# Patient Record
Sex: FEMALE | Race: WHITE | NOT HISPANIC OR LATINO | Employment: OTHER | URBAN - NONMETROPOLITAN AREA
[De-identification: names, ages, dates, MRNs, and addresses within clinical notes are randomized per-mention and may not be internally consistent; named-entity substitution may affect disease eponyms.]

---

## 2017-05-21 ENCOUNTER — APPOINTMENT (OUTPATIENT)
Dept: GENERAL RADIOLOGY | Facility: HOSPITAL | Age: 82
End: 2017-05-21

## 2017-05-21 ENCOUNTER — HOSPITAL ENCOUNTER (EMERGENCY)
Facility: HOSPITAL | Age: 82
Discharge: HOME | End: 2017-05-21
Attending: EMERGENCY MEDICINE | Admitting: EMERGENCY MEDICINE

## 2017-05-21 VITALS
TEMPERATURE: 97.5 F | DIASTOLIC BLOOD PRESSURE: 82 MMHG | BODY MASS INDEX: 28.25 KG/M2 | HEART RATE: 79 BPM | OXYGEN SATURATION: 96 % | WEIGHT: 180 LBS | HEIGHT: 67 IN | SYSTOLIC BLOOD PRESSURE: 125 MMHG | RESPIRATION RATE: 22 BRPM

## 2017-05-21 DIAGNOSIS — I50.32 CHRONIC DIASTOLIC CONGESTIVE HEART FAILURE (HCC): Primary | ICD-10-CM

## 2017-05-21 LAB
ALBUMIN SERPL-MCNC: 4 G/DL (ref 3.4–4.8)
ALBUMIN/GLOB SERPL: 1.4 G/DL (ref 1.5–2.5)
ALP SERPL-CCNC: 53 U/L (ref 35–104)
ALT SERPL W P-5'-P-CCNC: 12 U/L (ref 10–36)
ANION GAP SERPL CALCULATED.3IONS-SCNC: 10.4 MMOL/L (ref 3.6–11.2)
AST SERPL-CCNC: 16 U/L (ref 10–30)
BASOPHILS # BLD AUTO: 0.07 10*3/MM3 (ref 0–0.3)
BASOPHILS NFR BLD AUTO: 1 % (ref 0–2)
BILIRUB SERPL-MCNC: 0.9 MG/DL (ref 0.2–1.8)
BNP SERPL-MCNC: 2050 PG/ML (ref 0–100)
BUN BLD-MCNC: 18 MG/DL (ref 7–21)
BUN/CREAT SERPL: 10.2 (ref 7–25)
CALCIUM SPEC-SCNC: 9.3 MG/DL (ref 7.7–10)
CHLORIDE SERPL-SCNC: 104 MMOL/L (ref 99–112)
CO2 SERPL-SCNC: 26.6 MMOL/L (ref 24.3–31.9)
CREAT BLD-MCNC: 1.77 MG/DL (ref 0.43–1.29)
DEPRECATED RDW RBC AUTO: 48.2 FL (ref 37–54)
EOSINOPHIL # BLD AUTO: 0.1 10*3/MM3 (ref 0–0.7)
EOSINOPHIL NFR BLD AUTO: 1.4 % (ref 0–7)
ERYTHROCYTE [DISTWIDTH] IN BLOOD BY AUTOMATED COUNT: 18 % (ref 11.5–14.5)
GFR SERPL CREATININE-BSD FRML MDRD: 27 ML/MIN/1.73
GLOBULIN UR ELPH-MCNC: 2.9 GM/DL
GLUCOSE BLD-MCNC: 103 MG/DL (ref 70–110)
HCT VFR BLD AUTO: 33.1 % (ref 37–47)
HGB BLD-MCNC: 9.8 G/DL (ref 12–16)
HOLD SPECIMEN: NORMAL
HOLD SPECIMEN: NORMAL
IMM GRANULOCYTES # BLD: 0 10*3/MM3 (ref 0–0.03)
IMM GRANULOCYTES NFR BLD: 0 % (ref 0–0.5)
LYMPHOCYTES # BLD AUTO: 0.93 10*3/MM3 (ref 1–3)
LYMPHOCYTES NFR BLD AUTO: 13.5 % (ref 16–46)
MCH RBC QN AUTO: 22.8 PG (ref 27–33)
MCHC RBC AUTO-ENTMCNC: 29.6 G/DL (ref 33–37)
MCV RBC AUTO: 77 FL (ref 80–94)
MONOCYTES # BLD AUTO: 0.56 10*3/MM3 (ref 0.1–0.9)
MONOCYTES NFR BLD AUTO: 8.1 % (ref 0–12)
NEUTROPHILS # BLD AUTO: 5.25 10*3/MM3 (ref 1.4–6.5)
NEUTROPHILS NFR BLD AUTO: 76 % (ref 40–75)
OSMOLALITY SERPL CALC.SUM OF ELEC: 283.4 MOSM/KG (ref 273–305)
PLATELET # BLD AUTO: 268 10*3/MM3 (ref 130–400)
PMV BLD AUTO: 10 FL (ref 6–10)
POTASSIUM BLD-SCNC: 3.8 MMOL/L (ref 3.5–5.3)
PROT SERPL-MCNC: 6.9 G/DL (ref 6–8)
RBC # BLD AUTO: 4.3 10*6/MM3 (ref 4.2–5.4)
SODIUM BLD-SCNC: 141 MMOL/L (ref 135–153)
TROPONIN I SERPL-MCNC: 0.01 NG/ML
WBC NRBC COR # BLD: 6.91 10*3/MM3 (ref 4.5–12.5)
WHOLE BLOOD HOLD SPECIMEN: NORMAL

## 2017-05-21 PROCEDURE — 93005 ELECTROCARDIOGRAM TRACING: CPT

## 2017-05-21 PROCEDURE — 71020 XR CHEST 2 VW: CPT | Performed by: RADIOLOGY

## 2017-05-21 PROCEDURE — 83880 ASSAY OF NATRIURETIC PEPTIDE: CPT | Performed by: EMERGENCY MEDICINE

## 2017-05-21 PROCEDURE — 93010 ELECTROCARDIOGRAM REPORT: CPT | Performed by: INTERNAL MEDICINE

## 2017-05-21 PROCEDURE — 96372 THER/PROPH/DIAG INJ SC/IM: CPT

## 2017-05-21 PROCEDURE — 99285 EMERGENCY DEPT VISIT HI MDM: CPT

## 2017-05-21 PROCEDURE — 80053 COMPREHEN METABOLIC PANEL: CPT | Performed by: EMERGENCY MEDICINE

## 2017-05-21 PROCEDURE — 71020 HC CHEST PA AND LATERAL: CPT

## 2017-05-21 PROCEDURE — 84484 ASSAY OF TROPONIN QUANT: CPT | Performed by: EMERGENCY MEDICINE

## 2017-05-21 PROCEDURE — 85025 COMPLETE CBC W/AUTO DIFF WBC: CPT | Performed by: EMERGENCY MEDICINE

## 2017-05-21 RX ORDER — BUMETANIDE 0.25 MG/ML
1 INJECTION INTRAMUSCULAR; INTRAVENOUS ONCE
Status: COMPLETED | OUTPATIENT
Start: 2017-05-21 | End: 2017-05-21

## 2017-05-21 RX ORDER — SODIUM CHLORIDE 0.9 % (FLUSH) 0.9 %
10 SYRINGE (ML) INJECTION AS NEEDED
Status: DISCONTINUED | OUTPATIENT
Start: 2017-05-21 | End: 2017-05-21 | Stop reason: HOSPADM

## 2017-05-21 RX ORDER — FUROSEMIDE 40 MG/1
40 TABLET ORAL ONCE
Status: COMPLETED | OUTPATIENT
Start: 2017-05-21 | End: 2017-05-21

## 2017-05-21 RX ADMIN — BUMETANIDE 1 MG: 0.25 INJECTION, SOLUTION INTRAMUSCULAR; INTRAVENOUS at 19:01

## 2017-05-21 RX ADMIN — FUROSEMIDE 40 MG: 40 TABLET ORAL at 16:39

## 2017-06-09 ENCOUNTER — APPOINTMENT (OUTPATIENT)
Dept: GENERAL RADIOLOGY | Facility: HOSPITAL | Age: 82
End: 2017-06-09

## 2017-06-09 ENCOUNTER — HOSPITAL ENCOUNTER (INPATIENT)
Facility: HOSPITAL | Age: 82
LOS: 3 days | Discharge: NURSING, REHAB OR LONG-TERM CARE | End: 2017-06-12
Attending: FAMILY MEDICINE | Admitting: FAMILY MEDICINE

## 2017-06-09 PROBLEM — R60.9 EDEMA: Status: ACTIVE | Noted: 2017-06-09

## 2017-06-09 LAB
A-A DO2: 123.1 MMHG (ref 0–300)
ALBUMIN SERPL-MCNC: 4 G/DL (ref 3.4–4.8)
ALBUMIN/GLOB SERPL: 1.3 G/DL (ref 1.5–2.5)
ALP SERPL-CCNC: 60 U/L (ref 35–104)
ALT SERPL W P-5'-P-CCNC: 13 U/L (ref 10–36)
ANION GAP SERPL CALCULATED.3IONS-SCNC: 7.8 MMOL/L (ref 3.6–11.2)
ARTERIAL PATENCY WRIST A: ABNORMAL
AST SERPL-CCNC: 14 U/L (ref 10–30)
ATMOSPHERIC PRESS: 726 MMHG
BASE EXCESS BLDA CALC-SCNC: -3.2 MMOL/L
BASOPHILS # BLD AUTO: 0.04 10*3/MM3 (ref 0–0.3)
BASOPHILS NFR BLD AUTO: 0.5 % (ref 0–2)
BDY SITE: ABNORMAL
BILIRUB SERPL-MCNC: 1.2 MG/DL (ref 0.2–1.8)
BNP SERPL-MCNC: 2486 PG/ML (ref 0–100)
BODY TEMPERATURE: 98.6 C
BUN BLD-MCNC: 27 MG/DL (ref 7–21)
BUN/CREAT SERPL: 15.1 (ref 7–25)
CALCIUM SPEC-SCNC: 9.6 MG/DL (ref 7.7–10)
CHLORIDE SERPL-SCNC: 105 MMOL/L (ref 99–112)
CO2 SERPL-SCNC: 28.2 MMOL/L (ref 24.3–31.9)
COHGB MFR BLD: 1.1 % (ref 0–5)
CREAT BLD-MCNC: 1.79 MG/DL (ref 0.43–1.29)
DEPRECATED RDW RBC AUTO: 48.6 FL (ref 37–54)
EOSINOPHIL # BLD AUTO: 0.01 10*3/MM3 (ref 0–0.7)
EOSINOPHIL NFR BLD AUTO: 0.1 % (ref 0–7)
ERYTHROCYTE [DISTWIDTH] IN BLOOD BY AUTOMATED COUNT: 18.7 % (ref 11.5–14.5)
GFR SERPL CREATININE-BSD FRML MDRD: 27 ML/MIN/1.73
GLOBULIN UR ELPH-MCNC: 3.2 GM/DL
GLUCOSE BLD-MCNC: 130 MG/DL (ref 70–110)
HCO3 BLDA-SCNC: 20.4 MMOL/L (ref 22–26)
HCT VFR BLD AUTO: 31.2 % (ref 37–47)
HCT VFR BLD CALC: 30 % (ref 37–47)
HGB BLD-MCNC: 9.4 G/DL (ref 12–16)
HGB BLDA-MCNC: 10.2 G/DL (ref 12–16)
HOROWITZ INDEX BLD+IHG-RTO: 32 %
IMM GRANULOCYTES # BLD: 0.02 10*3/MM3 (ref 0–0.03)
IMM GRANULOCYTES NFR BLD: 0.2 % (ref 0–0.5)
INR PPP: 4.94 (ref 0.8–1.1)
LYMPHOCYTES # BLD AUTO: 0.44 10*3/MM3 (ref 1–3)
LYMPHOCYTES NFR BLD AUTO: 5.5 % (ref 16–46)
MCH RBC QN AUTO: 22.9 PG (ref 27–33)
MCHC RBC AUTO-ENTMCNC: 30.1 G/DL (ref 33–37)
MCV RBC AUTO: 75.9 FL (ref 80–94)
METHGB BLD QL: 0.4 % (ref 0–3)
MODALITY: ABNORMAL
MONOCYTES # BLD AUTO: 0.47 10*3/MM3 (ref 0.1–0.9)
MONOCYTES NFR BLD AUTO: 5.8 % (ref 0–12)
NEUTROPHILS # BLD AUTO: 7.06 10*3/MM3 (ref 1.4–6.5)
NEUTROPHILS NFR BLD AUTO: 87.9 % (ref 40–75)
OSMOLALITY SERPL CALC.SUM OF ELEC: 288.1 MOSM/KG (ref 273–305)
OXYHGB MFR BLDV: 86.7 % (ref 85–100)
PCO2 BLDA: 31.6 MM HG (ref 35–45)
PH BLDA: 7.43 PH UNITS (ref 7.35–7.45)
PLATELET # BLD AUTO: 258 10*3/MM3 (ref 130–400)
PMV BLD AUTO: 9.9 FL (ref 6–10)
PO2 BLDA: 57.2 MM HG (ref 80–100)
POTASSIUM BLD-SCNC: 3.8 MMOL/L (ref 3.5–5.3)
PROT SERPL-MCNC: 7.2 G/DL (ref 6–8)
PROTHROMBIN TIME: 60.7 SECONDS (ref 9.8–11.9)
RBC # BLD AUTO: 4.11 10*6/MM3 (ref 4.2–5.4)
SAO2 % BLDCOA: 88 % (ref 90–100)
SODIUM BLD-SCNC: 141 MMOL/L (ref 135–153)
WBC NRBC COR # BLD: 8.04 10*3/MM3 (ref 4.5–12.5)

## 2017-06-09 PROCEDURE — 82805 BLOOD GASES W/O2 SATURATION: CPT | Performed by: FAMILY MEDICINE

## 2017-06-09 PROCEDURE — 85610 PROTHROMBIN TIME: CPT | Performed by: FAMILY MEDICINE

## 2017-06-09 PROCEDURE — 25010000002 ONDANSETRON PER 1 MG: Performed by: INTERNAL MEDICINE

## 2017-06-09 PROCEDURE — 83880 ASSAY OF NATRIURETIC PEPTIDE: CPT | Performed by: FAMILY MEDICINE

## 2017-06-09 PROCEDURE — 83050 HGB METHEMOGLOBIN QUAN: CPT | Performed by: FAMILY MEDICINE

## 2017-06-09 PROCEDURE — 94799 UNLISTED PULMONARY SVC/PX: CPT

## 2017-06-09 PROCEDURE — 82375 ASSAY CARBOXYHB QUANT: CPT | Performed by: FAMILY MEDICINE

## 2017-06-09 PROCEDURE — 80053 COMPREHEN METABOLIC PANEL: CPT | Performed by: FAMILY MEDICINE

## 2017-06-09 PROCEDURE — 85025 COMPLETE CBC W/AUTO DIFF WBC: CPT | Performed by: FAMILY MEDICINE

## 2017-06-09 PROCEDURE — 71020 XR CHEST PA AND LATERAL: CPT | Performed by: RADIOLOGY

## 2017-06-09 PROCEDURE — 36600 WITHDRAWAL OF ARTERIAL BLOOD: CPT | Performed by: FAMILY MEDICINE

## 2017-06-09 PROCEDURE — 71020 HC CHEST PA AND LATERAL: CPT

## 2017-06-09 RX ORDER — AMLODIPINE BESYLATE 10 MG/1
10 TABLET ORAL NIGHTLY
Status: CANCELLED | OUTPATIENT
Start: 2017-06-09

## 2017-06-09 RX ORDER — HYDROCODONE BITARTRATE AND ACETAMINOPHEN 7.5; 325 MG/1; MG/1
1 TABLET ORAL EVERY 12 HOURS SCHEDULED
Status: CANCELLED | OUTPATIENT
Start: 2017-06-09

## 2017-06-09 RX ORDER — METOPROLOL TARTRATE 100 MG/1
100 TABLET ORAL 2 TIMES DAILY
Status: CANCELLED | OUTPATIENT
Start: 2017-06-09

## 2017-06-09 RX ORDER — FUROSEMIDE 20 MG/1
20 TABLET ORAL DAILY
Status: CANCELLED | OUTPATIENT
Start: 2017-06-09

## 2017-06-09 RX ORDER — AMLODIPINE BESYLATE 10 MG/1
10 TABLET ORAL DAILY
Status: CANCELLED | OUTPATIENT
Start: 2017-06-09

## 2017-06-09 RX ORDER — ONDANSETRON 2 MG/ML
4 INJECTION INTRAMUSCULAR; INTRAVENOUS EVERY 6 HOURS PRN
Status: DISCONTINUED | OUTPATIENT
Start: 2017-06-09 | End: 2017-06-10

## 2017-06-09 RX ORDER — CITALOPRAM 20 MG/1
20 TABLET ORAL DAILY
Status: CANCELLED | OUTPATIENT
Start: 2017-06-09

## 2017-06-09 RX ORDER — ISOSORBIDE MONONITRATE 60 MG/1
60 TABLET, EXTENDED RELEASE ORAL NIGHTLY
Status: CANCELLED | OUTPATIENT
Start: 2017-06-09

## 2017-06-09 RX ORDER — CITALOPRAM 20 MG/1
20 TABLET ORAL NIGHTLY
Status: CANCELLED | OUTPATIENT
Start: 2017-06-09

## 2017-06-09 RX ORDER — ALPRAZOLAM 1 MG/1
1 TABLET ORAL 2 TIMES DAILY
Status: CANCELLED | OUTPATIENT
Start: 2017-06-09

## 2017-06-09 RX ORDER — WARFARIN SODIUM 4 MG/1
6 TABLET ORAL
Status: ON HOLD | COMMUNITY
End: 2017-07-27

## 2017-06-09 RX ORDER — PANTOPRAZOLE SODIUM 40 MG/1
40 TABLET, DELAYED RELEASE ORAL DAILY
Status: CANCELLED | OUTPATIENT
Start: 2017-06-09

## 2017-06-09 RX ORDER — PANTOPRAZOLE SODIUM 40 MG/1
40 TABLET, DELAYED RELEASE ORAL EVERY MORNING
Status: CANCELLED | OUTPATIENT
Start: 2017-06-09

## 2017-06-09 RX ORDER — SODIUM CHLORIDE 0.9 % (FLUSH) 0.9 %
1-10 SYRINGE (ML) INJECTION AS NEEDED
Status: DISCONTINUED | OUTPATIENT
Start: 2017-06-09 | End: 2017-06-12 | Stop reason: HOSPADM

## 2017-06-09 RX ORDER — ALBUTEROL SULFATE 2.5 MG/3ML
2.5 SOLUTION RESPIRATORY (INHALATION)
Status: DISCONTINUED | OUTPATIENT
Start: 2017-06-10 | End: 2017-06-11

## 2017-06-09 RX ORDER — ISOSORBIDE MONONITRATE 60 MG/1
60 TABLET, EXTENDED RELEASE ORAL DAILY
Status: CANCELLED | OUTPATIENT
Start: 2017-06-09

## 2017-06-09 RX ORDER — ALPRAZOLAM 1 MG/1
1 TABLET ORAL EVERY 12 HOURS SCHEDULED
Status: CANCELLED | OUTPATIENT
Start: 2017-06-09

## 2017-06-09 RX ORDER — HYDROCODONE BITARTRATE AND ACETAMINOPHEN 7.5; 325 MG/1; MG/1
1 TABLET ORAL 2 TIMES DAILY
Status: CANCELLED | OUTPATIENT
Start: 2017-06-09

## 2017-06-09 RX ORDER — METOPROLOL TARTRATE 100 MG/1
100 TABLET ORAL EVERY 12 HOURS SCHEDULED
Status: CANCELLED | OUTPATIENT
Start: 2017-06-09

## 2017-06-09 RX ADMIN — ONDANSETRON 4 MG: 2 INJECTION, SOLUTION INTRAMUSCULAR; INTRAVENOUS at 23:12

## 2017-06-09 NOTE — H&P
CHIEF COMPLAINT:  Generalized edema, short of breath.     HISTORY OF PRESENT ILLNESS:  Patient is an 84 year old with a history of hypertension, chronic kidney disease, chronic atrial fibrillation that presented in a wheelchair, just stating that she is just having swelling in her legs, tightness in her chest, and just generalized shortness of breath. She has a history of some chronic kidney disease stage 4, but has not gone to see her  nephrology appointment. With the progressive shortness of breath and edema, we will admit.     SOCIAL HISTORY:  She does not smoke or drink.     ALLERGIES:  No known drug allergies.     MEDICATIONS:  1.  Xanax 1 mg b.i.d.   2.  Norco p.r.n.   3.  Protonix 40 daily.   4.  Celexa 20 daily.  5.  Amlodipine 10 daily.   6.  Metoprolol 100 b.i.d.   7.  Isosorbide 60 daily.   8.  Lasix 20 daily.   9.  Coumadin 4 mg 1-1/2 daily.     PAST MEDICAL HISTORY:  1.  A degenerative disk of her back.  2.  Hypertension.   3.  OA of the knees.  4.  Chronic atrial fibrillation.  5.  Chronic kidney disease.     PAST SURGICAL HISTORY:  She has had coronary artery stent per Dr. Bernal in 2014.     PHYSICAL EXAMINATION:  GENERAL APPEARANCE: She is alert; looks like she does not feel well.   NECK: Supple, with no JVD.   LUNGS: With bilateral crackles.   HEART: S1, S2.   ABDOMEN: Soft.   EXTREMITIES: With +3 edema.     IMPRESSION:  1.  Edema.  2.  Chronic kidney disease stage 4, of which has not followed up with Nephrology.   3.  Chronic atrial fibrillation.   4.  Probable CHF.     PLAN:  To admit, obtain general labs, Nephrology consult, and diurese if her kidneys are appropriate for. We will obtain a Nephrology assistance in her electrolytes and renal status.            D: MARLEN 06/09/2017 16:49:13 ET  T: ms / 06/09/2017 17:26:38 ET  Revision Count: 0  Job ID: [id number]  [id number]  cc:        Dictator Signature:___________________________     Myles Barone MD

## 2017-06-09 NOTE — PLAN OF CARE
Problem: Fall Risk (Adult)  Goal: Identify Related Risk Factors and Signs and Symptoms  Outcome: Ongoing (interventions implemented as appropriate)  Goal: Absence of Falls  Outcome: Ongoing (interventions implemented as appropriate)    Problem: Pressure Ulcer Risk (Diogo Scale) (Adult,Obstetrics,Pediatric)  Goal: Identify Related Risk Factors and Signs and Symptoms  Outcome: Ongoing (interventions implemented as appropriate)  Goal: Skin Integrity  Outcome: Ongoing (interventions implemented as appropriate)    Problem: Infection, Risk/Actual (Adult)  Goal: Identify Related Risk Factors and Signs and Symptoms  Outcome: Ongoing (interventions implemented as appropriate)  Goal: Infection Prevention/Resolution  Outcome: Ongoing (interventions implemented as appropriate)    Problem: Fluid Volume Excess (Adult,Obstetrics,Pediatric)  Goal: Identify Related Risk Factors and Signs and Symptoms  Outcome: Ongoing (interventions implemented as appropriate)  Goal: Stable Weight  Outcome: Ongoing (interventions implemented as appropriate)  Goal: Balanced Intake/Output  Outcome: Ongoing (interventions implemented as appropriate)

## 2017-06-10 LAB
ALBUMIN UR-MCNC: 270.9 MG/L
BACTERIA UR QL AUTO: ABNORMAL /HPF
BILIRUB UR QL STRIP: NEGATIVE
CLARITY UR: CLEAR
COLOR UR: ABNORMAL
CREAT UR-MCNC: 114 MG/DL
CREAT UR-MCNC: 114 MG/DL
GLUCOSE UR STRIP-MCNC: NEGATIVE MG/DL
HGB UR QL STRIP.AUTO: ABNORMAL
HYALINE CASTS UR QL AUTO: ABNORMAL /LPF
INR PPP: 4.46 (ref 0.8–1.1)
KETONES UR QL STRIP: NEGATIVE
LEUKOCYTE ESTERASE UR QL STRIP.AUTO: NEGATIVE
MICROALBUMIN/CREAT UR: 237.6 MG/G
NITRITE UR QL STRIP: NEGATIVE
PH UR STRIP.AUTO: 5.5 [PH] (ref 5–8)
PROT UR QL STRIP: ABNORMAL
PROT UR-MCNC: 53.6 MG/DL
PROT/CREAT UR: 470.2 MG/G CREA (ref 0–200)
PROTHROMBIN TIME: 54.5 SECONDS (ref 9.8–11.9)
RBC # UR: ABNORMAL /HPF
REF LAB TEST METHOD: ABNORMAL
SP GR UR STRIP: 1.02 (ref 1–1.03)
SQUAMOUS #/AREA URNS HPF: ABNORMAL /HPF
UROBILINOGEN UR QL STRIP: ABNORMAL
WBC UR QL AUTO: ABNORMAL /HPF

## 2017-06-10 PROCEDURE — 82570 ASSAY OF URINE CREATININE: CPT | Performed by: INTERNAL MEDICINE

## 2017-06-10 PROCEDURE — 82043 UR ALBUMIN QUANTITATIVE: CPT | Performed by: INTERNAL MEDICINE

## 2017-06-10 PROCEDURE — 94640 AIRWAY INHALATION TREATMENT: CPT

## 2017-06-10 PROCEDURE — 84156 ASSAY OF PROTEIN URINE: CPT | Performed by: INTERNAL MEDICINE

## 2017-06-10 PROCEDURE — 85610 PROTHROMBIN TIME: CPT | Performed by: FAMILY MEDICINE

## 2017-06-10 PROCEDURE — 81001 URINALYSIS AUTO W/SCOPE: CPT | Performed by: INTERNAL MEDICINE

## 2017-06-10 PROCEDURE — 94799 UNLISTED PULMONARY SVC/PX: CPT

## 2017-06-10 PROCEDURE — 25010000002 FUROSEMIDE PER 20 MG: Performed by: INTERNAL MEDICINE

## 2017-06-10 RX ORDER — FUROSEMIDE 10 MG/ML
40 INJECTION INTRAMUSCULAR; INTRAVENOUS EVERY 12 HOURS SCHEDULED
Status: DISCONTINUED | OUTPATIENT
Start: 2017-06-10 | End: 2017-06-12 | Stop reason: HOSPADM

## 2017-06-10 RX ORDER — ISOSORBIDE MONONITRATE 60 MG/1
60 TABLET, EXTENDED RELEASE ORAL
Status: DISCONTINUED | OUTPATIENT
Start: 2017-06-10 | End: 2017-06-12 | Stop reason: HOSPADM

## 2017-06-10 RX ORDER — ALPRAZOLAM 1 MG/1
1 TABLET ORAL 2 TIMES DAILY PRN
Status: DISCONTINUED | OUTPATIENT
Start: 2017-06-10 | End: 2017-06-12 | Stop reason: HOSPADM

## 2017-06-10 RX ORDER — METOPROLOL TARTRATE 100 MG/1
100 TABLET ORAL EVERY 12 HOURS SCHEDULED
Status: DISCONTINUED | OUTPATIENT
Start: 2017-06-10 | End: 2017-06-12 | Stop reason: HOSPADM

## 2017-06-10 RX ORDER — PANTOPRAZOLE SODIUM 40 MG/1
40 TABLET, DELAYED RELEASE ORAL DAILY
Status: DISCONTINUED | OUTPATIENT
Start: 2017-06-10 | End: 2017-06-12 | Stop reason: HOSPADM

## 2017-06-10 RX ORDER — CITALOPRAM 20 MG/1
20 TABLET ORAL DAILY
Status: DISCONTINUED | OUTPATIENT
Start: 2017-06-10 | End: 2017-06-12 | Stop reason: HOSPADM

## 2017-06-10 RX ORDER — HYDROCODONE BITARTRATE AND ACETAMINOPHEN 7.5; 325 MG/1; MG/1
1 TABLET ORAL 2 TIMES DAILY PRN
Status: DISCONTINUED | OUTPATIENT
Start: 2017-06-10 | End: 2017-06-12 | Stop reason: HOSPADM

## 2017-06-10 RX ADMIN — METOPROLOL TARTRATE 100 MG: 100 TABLET, FILM COATED ORAL at 12:02

## 2017-06-10 RX ADMIN — ALBUTEROL SULFATE 2.5 MG: 2.5 SOLUTION RESPIRATORY (INHALATION) at 19:15

## 2017-06-10 RX ADMIN — ALBUTEROL SULFATE 2.5 MG: 2.5 SOLUTION RESPIRATORY (INHALATION) at 10:45

## 2017-06-10 RX ADMIN — ALBUTEROL SULFATE 2.5 MG: 2.5 SOLUTION RESPIRATORY (INHALATION) at 00:04

## 2017-06-10 RX ADMIN — PANTOPRAZOLE SODIUM 40 MG: 40 TABLET, DELAYED RELEASE ORAL at 17:39

## 2017-06-10 RX ADMIN — ALBUTEROL SULFATE 2.5 MG: 2.5 SOLUTION RESPIRATORY (INHALATION) at 14:25

## 2017-06-10 RX ADMIN — FUROSEMIDE 40 MG: 10 INJECTION, SOLUTION INTRAMUSCULAR; INTRAVENOUS at 20:23

## 2017-06-10 RX ADMIN — ISOSORBIDE MONONITRATE 60 MG: 60 TABLET, EXTENDED RELEASE ORAL at 12:02

## 2017-06-10 RX ADMIN — ALPRAZOLAM 1 MG: 1 TABLET ORAL at 22:01

## 2017-06-10 RX ADMIN — CITALOPRAM HYDROBROMIDE 20 MG: 20 TABLET ORAL at 17:39

## 2017-06-10 RX ADMIN — FUROSEMIDE 40 MG: 10 INJECTION, SOLUTION INTRAMUSCULAR; INTRAVENOUS at 12:02

## 2017-06-10 RX ADMIN — ALBUTEROL SULFATE 2.5 MG: 2.5 SOLUTION RESPIRATORY (INHALATION) at 07:16

## 2017-06-10 NOTE — CONSULTS
Nephrology Consult Note    Referring Provider: Dr Barone  Reason for Consultation: CKD 3     Subjective       History of present illness:  Tre Kinney is a 84 y.o. female who presented to [facility] emergency department with chief complaint of shortness of breath on minimal exertion and worsening bilateral lower extremity edema for 1 week. She has baseline CKD 3 with creatinine 1.6-1.9. She has not been able to see outpatient nephrology due to poor functional status. She also complains of dry cough for 1 week. She complains of epigastric pain. She has no nausea, vomiting or diarrhea. She has chronic knee pain. She denies easy bruising   no Chronic NSAIDS use. Patient denies hematuria, dysuria but has difficulty emptying bladder. She feels her bladder is about to burst but is not able to void. She denies focal weakness, headaches or palpitations. She admits to eating high salt diet ( canned food, pickles etc). She has not been checking BP or weights at home.      History  Past Medical History:   Diagnosis Date   • Arthritis    • CHF (congestive heart failure)    • Coronary artery disease    • H/O blood clots    • History of transfusion     no reaction    • Hypertension    , Past Surgical History:   Procedure Laterality Date   • ABDOMINAL SURGERY     • APPENDECTOMY     • CARDIAC SURGERY     •  SECTION     • CORONARY ANGIOPLASTY WITH STENT PLACEMENT     • PACEMAKER IMPLANTATION      left side    • SKIN BIOPSY     • TUBAL ABDOMINAL LIGATION     , Family History   Problem Relation Age of Onset   • Other Mother      cardiac disorder   • Hypertension Mother    • Heart disease Mother    • Thyroid disease Mother    • Other Father      cardiac disorder   • Heart disease Father    • Hypertension Father    • Other Sister      cardiac disorder   • Other Brother      cardiac disorder   • Other Other      cardiac disorder   • Other Other      cardiac disorder   , Social History   Substance Use Topics   •  Smoking status: Never Smoker   • Smokeless tobacco: Not on file   • Alcohol use No   , Prescriptions Prior to Admission   Medication Sig Dispense Refill Last Dose   • ALPRAZolam (XANAX) 1 MG tablet Take 1 mg by mouth 2 (Two) Times a Day.   6/8/2017 at Unknown time   • amLODIPine (NORVASC) 10 MG tablet Take 10 mg by mouth Daily.   6/8/2017 at Unknown time   • citalopram (CELEXA) 20 MG tablet Take 20 mg by mouth Daily.   6/8/2017 at Unknown time   • furosemide (LASIX) 20 MG tablet Take 20 mg by mouth Daily.   6/8/2017 at Unknown time   • HYDROcodone-acetaminophen (NORCO) 7.5-325 MG per tablet Take 1 tablet by mouth 2 (Two) Times a Day.   6/8/2017 at Unknown time   • isosorbide mononitrate (IMDUR) 60 MG 24 hr tablet Take 60 mg by mouth Daily.   6/8/2017 at Unknown time   • metoprolol tartrate (LOPRESSOR) 100 MG tablet Take 100 mg by mouth 2 (Two) Times a Day.   6/8/2017 at Unknown time   • pantoprazole (PROTONIX) 40 MG EC tablet Take 40 mg by mouth Daily.   6/8/2017 at Unknown time   • warfarin (COUMADIN) 4 MG tablet Take 6 mg by mouth Daily.   6/8/2017 at Unknown time   , Scheduled Meds:    albuterol 2.5 mg Nebulization Q4H - RT   furosemide 40 mg Intravenous Q12H   , Continuous Infusions:    hold 1 each   , PRN Meds:  sodium chloride and Allergies:  Latex    Review of Systems  More than 10 point review if systems was done. Pertinent items are noted in HPI, all other systems reviewed and negative    Objective     Vital Signs  Temp:  [97.5 °F (36.4 °C)-98 °F (36.7 °C)] 98 °F (36.7 °C)  Heart Rate:  [72-89] 81  Resp:  [19-24] 22  BP: (146-174)/(72-93) 174/93       I/O last 3 completed shifts:  In: 480 [P.O.:480]  Out: 400 [Urine:400]    Physical Examination:    General Appearance : alert, appears stated age, cooperative and no distress  Head : normocephalic, without obvious abnormality and atraumatic  Eyes : conjunctivae and sclerae normal, no icterus, no pallor and PERRLA  Throat : oral mucosa moist  Neck: no  adenopathy, suppple, no carotid bruit and no JVD  Lungs : reduced breath sounds at bases with mild wheezing, no crackles  Heart : regular rhythm & normal rate, normal S1, S2, no murmur, no david, no rub   Abdomen :  normal bowel sounds, no masses and soft non-tender  Rectal : Deferred  Extremities : moves extremities well, no redness and 1+ lower extremity edema  Pulses :  palpable and equal bilaterally  Skin : no bleeding, bruising or rash  Neurologic : orientated to person, place, time, grossly no focal deficitis    Laboratory Data :      WBC WBC   Date Value Ref Range Status   06/09/2017 8.04 4.50 - 12.50 10*3/mm3 Final      HGB Hemoglobin   Date Value Ref Range Status   06/09/2017 9.4 (L) 12.0 - 16.0 g/dL Final      HCT Hematocrit   Date Value Ref Range Status   06/09/2017 31.2 (L) 37.0 - 47.0 % Final      Platlets No results found for: LABPLAT   MCV MCV   Date Value Ref Range Status   06/09/2017 75.9 (L) 80.0 - 94.0 fL Final          Sodium Sodium   Date Value Ref Range Status   06/09/2017 141 135 - 153 mmol/L Final      Potassium Potassium   Date Value Ref Range Status   06/09/2017 3.8 3.5 - 5.3 mmol/L Final      Chloride Chloride   Date Value Ref Range Status   06/09/2017 105 99 - 112 mmol/L Final      CO2 CO2   Date Value Ref Range Status   06/09/2017 28.2 24.3 - 31.9 mmol/L Final      BUN BUN   Date Value Ref Range Status   06/09/2017 27 (H) 7 - 21 mg/dL Final      Creatinine Creatinine   Date Value Ref Range Status   06/09/2017 1.79 (H) 0.43 - 1.29 mg/dL Final      Calcium Calcium   Date Value Ref Range Status   06/09/2017 9.6 7.7 - 10.0 mg/dL Final      PO4 No results found for: CAPO4   Albumin Albumin   Date Value Ref Range Status   06/09/2017 4.00 3.40 - 4.80 g/dL Final      Magnesium No results found for: MG   Uric Acid No results found for: URICACID     Radiology results :     Imaging Results (last 72 hours)     Procedure Component Value Units Date/Time    XR Chest PA & Lateral [[id number]] Collected:   06/09/17 1455     Updated:  06/09/17 1523    Narrative:       XR CHEST PA AND LATERAL-      HISTORY:   Dyspnea.          COMPARISON: 05/21/2017      TECHNIQUE: XR CHEST PA AND LATERAL-      FINDINGS:   There are bibasilar effusions and there is bibasilar consolidation.   Probable mild CHF.   No pneumothorax.   Bony and soft tissue structures are unremarkable.            Impression:       Bibasilar effusions, bibasilar consolidation, and mild CHF.     This report was finalized on 6/9/2017 3:21 PM by Dr. Bob Lovell MD.               Medications:        albuterol 2.5 mg Nebulization Q4H - RT   furosemide 40 mg Intravenous Q12H       hold 1 each       Assessment/Plan     Active Problems:    Edema      1. CKD 3 : She has baseline creatinine of 1.6-1.9. Creatinine is 1.79 today. I will check renal US, UA and urine protein. I will also rule out multiple myeloma due to her age.  I have discussed with RN to check bladder scan and place swan catheter if > 200 cc post void residual. Avoid nephrotoxins    2. Systolic CHF exacerbation : CXR reviewed personally and shows bilateral pleural effusion and pulmonary congestion. I will start lasix 40 mg iv bid, previous 2 d echo reviewed. Resume Imdur    3. HTN : keep /80s for renal perfusion. Resume metoprolol, continue to hold amlodipine    4. Anemia : check iron profile    5. Bone mineral disorder: check PTH, 25 hydroxy vitamin d     6. supratherapeutic INR : coumadin on hold    Thanks Dr Barone for the consult. We will follow with you.  I discussed the patients findings and my recommendations with patient and nursing staff    Sebas Ellis MD  06/10/17  8:51 AM

## 2017-06-10 NOTE — PROGRESS NOTES
"     LOS: 1 day     Chief Complaint:  Swelling and shortness of breath    Subjective     Interval History:     She has persistent swelling in left lower extremity and right lower extremity.  She states it may be a little better.  She denies any cough, fevers or chills.  No labs from this morning.  On the ninth are creatinine was 1.79.  BNP greater than 2000.  She had a chest x-ray yesterday that revealed bibasal or effusions with bibasilar consolidation and mild congestive heart failure.  Denies any black or bloody bowel movements.  No productive cough but does still feel short of breath.  Ejection fraction December was 42%.  Systolic blood pressure greater than 160      Objective     Vital Signs  /93 (BP Location: Left arm, Patient Position: Lying)  Pulse 80  Temp 98 °F (36.7 °C) (Oral)   Resp 22  Ht 67\" (170.2 cm)  Wt 204 lb (92.5 kg)  SpO2 98%  BMI 31.95 kg/m2  Intake & Output (last day)       06/09 0701 - 06/10 0700 06/10 0701 - 06/11 0700    P.O. 480 360    Total Intake(mL/kg) 480 (5.2) 360 (3.9)    Urine (mL/kg/hr) 400     Stool 0     Total Output 400      Net +80 +360          Unmeasured Urine Occurrence 4 x     Unmeasured Stool Occurrence 0 x             Physical Exam:     General Appearance:    Alert, cooperative, in no acute distress   Head:    Normocephalic, without obvious abnormality, atraumatic   Eyes:            Lids and lashes normal, conjunctivae and sclerae normal, no   icterus, no pallor, corneas clear, PERRLA   Ears:    Ears appear intact with no abnormalities noted   Throat:   No oral lesions, no thrush, oral mucosa moist   Neck:   No adenopathy, supple, trachea midline, no thyromegaly, no   carotid bruit, no JVD   Lungs:     Bibasilar crackles.  Symmetric chest wall expansion     Heart:    Regular rhythm and normal rate, normal S1 and S2, no            murmur, no gallop, no rub, no click   Chest Wall:    No abnormalities observed   Abdomen:     Normal bowel sounds, no masses, no " organomegaly, soft        non-tender, non-distended, no guarding, no rebound                tenderness   Extremities:   Moves all extremities well, 3+ bilateral lower extremity edema, no cyanosis, no redness   Pulses:   Pulses palpable and equal bilaterally   Skin:   No bleeding, bruising or rash   Lymph nodes:   No palpable adenopathy   Neurologic:   Cranial nerves 2 - 12 grossly intact, sensation intact, DTR       present and equal bilaterally        Results Review:    Lab Results   Component Value Date    WBC 8.04 06/09/2017    HGB 9.4 (L) 06/09/2017    HCT 31.2 (L) 06/09/2017    MCV 75.9 (L) 06/09/2017     06/09/2017       Lab Results   Component Value Date    GLUCOSE 130 (H) 06/09/2017    BUN 27 (H) 06/09/2017    CREATININE 1.79 (H) 06/09/2017    EGFRIFNONA 27 (L) 06/09/2017    BCR 15.1 06/09/2017     06/09/2017    K 3.8 06/09/2017     06/09/2017    CO2 28.2 06/09/2017    CALCIUM 9.6 06/09/2017    ALBUMIN 4.00 06/09/2017    LABIL2 1.3 (L) 06/09/2017    AST 14 06/09/2017    ALT 13 06/09/2017     Lab Results   Component Value Date    INR 4.94 (C) 06/09/2017    INR 1.13 (H) 12/24/2016    INR 2.00 (H) 04/20/2016       No results found for: POCGLU       Medication Review:     Current Facility-Administered Medications:   •  albuterol (PROVENTIL) nebulizer solution 0.083% 2.5 mg/3mL, 2.5 mg, Nebulization, Q4H - RT, Samuel Duane Kreis, MD, 2.5 mg at 06/10/17 1045  •  furosemide (LASIX) injection 40 mg, 40 mg, Intravenous, Q12H, Sebas Ellis MD  •  Hold medication, 1 each, Does not apply, Continuous, Myles Barone MD  •  isosorbide mononitrate (IMDUR) 24 hr tablet 60 mg, 60 mg, Oral, Q24H, Sebas Ellis MD  •  metoprolol tartrate (LOPRESSOR) tablet 100 mg, 100 mg, Oral, Q12H, Sebas Ellis MD  •  sodium chloride 0.9 % flush 1-10 mL, 1-10 mL, Intravenous, PRN, Myles Barone MD      Assessment/Plan     Acute on chronic systolic congestive heart failure  Stage III chronic kidney  disease  Hypertension, uncontrolled  Paroxysmal atrial fibrillation    INR daily.  Hold warfarin today.  Supratherapeutic INR    Start Lopressor 100 mg twice a day.  Continue Imdur    Lasix 40 mg IV every 12 hours    Nephrology input appreciated    No ACE inhibitor or angiotensin receptor blocker secondary to renal failure      Samuel Duane Kreis, MD  06/10/17  11:49 AM

## 2017-06-10 NOTE — PLAN OF CARE
Problem: Patient Care Overview (Adult)  Goal: Plan of Care Review  Outcome: Ongoing (interventions implemented as appropriate)    06/10/17 0005 06/10/17 0910   Patient Care Overview   Progress no change --    Coping/Psychosocial Response Interventions   Plan Of Care Reviewed With --  patient         Problem: Fall Risk (Adult)  Goal: Identify Related Risk Factors and Signs and Symptoms  Outcome: Ongoing (interventions implemented as appropriate)  Goal: Absence of Falls  Outcome: Ongoing (interventions implemented as appropriate)    Problem: Pressure Ulcer Risk (Diogo Scale) (Adult,Obstetrics,Pediatric)  Goal: Identify Related Risk Factors and Signs and Symptoms  Outcome: Ongoing (interventions implemented as appropriate)  Goal: Skin Integrity  Outcome: Ongoing (interventions implemented as appropriate)    Problem: Infection, Risk/Actual (Adult)  Goal: Identify Related Risk Factors and Signs and Symptoms  Outcome: Ongoing (interventions implemented as appropriate)  Goal: Infection Prevention/Resolution  Outcome: Ongoing (interventions implemented as appropriate)    Problem: Fluid Volume Excess (Adult,Obstetrics,Pediatric)  Goal: Identify Related Risk Factors and Signs and Symptoms  Outcome: Ongoing (interventions implemented as appropriate)  Goal: Stable Weight  Outcome: Ongoing (interventions implemented as appropriate)  Goal: Balanced Intake/Output  Outcome: Ongoing (interventions implemented as appropriate)

## 2017-06-11 LAB
25(OH)D3 SERPL-MCNC: 13 NG/ML
ANION GAP SERPL CALCULATED.3IONS-SCNC: 6.7 MMOL/L (ref 3.6–11.2)
BASOPHILS # BLD AUTO: 0.04 10*3/MM3 (ref 0–0.3)
BASOPHILS NFR BLD AUTO: 0.6 % (ref 0–2)
BUN BLD-MCNC: 26 MG/DL (ref 7–21)
BUN/CREAT SERPL: 14.6 (ref 7–25)
CALCIUM SPEC-SCNC: 8.8 MG/DL (ref 7.7–10)
CHLORIDE SERPL-SCNC: 104 MMOL/L (ref 99–112)
CO2 SERPL-SCNC: 29.3 MMOL/L (ref 24.3–31.9)
CREAT BLD-MCNC: 1.78 MG/DL (ref 0.43–1.29)
DEPRECATED RDW RBC AUTO: 50.3 FL (ref 37–54)
EOSINOPHIL # BLD AUTO: 0.06 10*3/MM3 (ref 0–0.7)
EOSINOPHIL NFR BLD AUTO: 0.9 % (ref 0–7)
ERYTHROCYTE [DISTWIDTH] IN BLOOD BY AUTOMATED COUNT: 18.9 % (ref 11.5–14.5)
FERRITIN SERPL-MCNC: 22 NG/ML (ref 10–290.3)
GFR SERPL CREATININE-BSD FRML MDRD: 27 ML/MIN/1.73
GLUCOSE BLD-MCNC: 96 MG/DL (ref 70–110)
HCT VFR BLD AUTO: 28.6 % (ref 37–47)
HGB BLD-MCNC: 8.3 G/DL (ref 12–16)
IMM GRANULOCYTES # BLD: 0.01 10*3/MM3 (ref 0–0.03)
IMM GRANULOCYTES NFR BLD: 0.1 % (ref 0–0.5)
INR PPP: 4.12 (ref 0.8–1.1)
IRON 24H UR-MRATE: 17 MCG/DL (ref 49–151)
IRON SATN MFR SERPL: 5 % (ref 15–50)
LYMPHOCYTES # BLD AUTO: 0.78 10*3/MM3 (ref 1–3)
LYMPHOCYTES NFR BLD AUTO: 11.1 % (ref 16–46)
MCH RBC QN AUTO: 22.6 PG (ref 27–33)
MCHC RBC AUTO-ENTMCNC: 29 G/DL (ref 33–37)
MCV RBC AUTO: 77.7 FL (ref 80–94)
MONOCYTES # BLD AUTO: 0.73 10*3/MM3 (ref 0.1–0.9)
MONOCYTES NFR BLD AUTO: 10.4 % (ref 0–12)
NEUTROPHILS # BLD AUTO: 5.41 10*3/MM3 (ref 1.4–6.5)
NEUTROPHILS NFR BLD AUTO: 76.9 % (ref 40–75)
OSMOLALITY SERPL CALC.SUM OF ELEC: 284 MOSM/KG (ref 273–305)
PHOSPHATE SERPL-MCNC: 4.1 MG/DL (ref 2.7–4.5)
PLATELET # BLD AUTO: 229 10*3/MM3 (ref 130–400)
PMV BLD AUTO: 9.8 FL (ref 6–10)
POTASSIUM BLD-SCNC: 3.7 MMOL/L (ref 3.5–5.3)
PROTHROMBIN TIME: 50.1 SECONDS (ref 9.8–11.9)
PTH-INTACT SERPL-MCNC: 291.1 PG/ML (ref 14–72)
RBC # BLD AUTO: 3.68 10*6/MM3 (ref 4.2–5.4)
SODIUM BLD-SCNC: 140 MMOL/L (ref 135–153)
TIBC SERPL-MCNC: 364 MCG/DL (ref 241–421)
WBC NRBC COR # BLD: 7.03 10*3/MM3 (ref 4.5–12.5)

## 2017-06-11 PROCEDURE — 83970 ASSAY OF PARATHORMONE: CPT | Performed by: INTERNAL MEDICINE

## 2017-06-11 PROCEDURE — 82728 ASSAY OF FERRITIN: CPT | Performed by: INTERNAL MEDICINE

## 2017-06-11 PROCEDURE — 85610 PROTHROMBIN TIME: CPT | Performed by: FAMILY MEDICINE

## 2017-06-11 PROCEDURE — 84155 ASSAY OF PROTEIN SERUM: CPT | Performed by: INTERNAL MEDICINE

## 2017-06-11 PROCEDURE — 83540 ASSAY OF IRON: CPT | Performed by: INTERNAL MEDICINE

## 2017-06-11 PROCEDURE — 83883 ASSAY NEPHELOMETRY NOT SPEC: CPT | Performed by: INTERNAL MEDICINE

## 2017-06-11 PROCEDURE — 25010000002 FUROSEMIDE PER 20 MG: Performed by: INTERNAL MEDICINE

## 2017-06-11 PROCEDURE — 25010000002 IRON SUCROSE PER 1 MG: Performed by: INTERNAL MEDICINE

## 2017-06-11 PROCEDURE — 94799 UNLISTED PULMONARY SVC/PX: CPT

## 2017-06-11 PROCEDURE — 84100 ASSAY OF PHOSPHORUS: CPT | Performed by: INTERNAL MEDICINE

## 2017-06-11 PROCEDURE — 83550 IRON BINDING TEST: CPT | Performed by: INTERNAL MEDICINE

## 2017-06-11 PROCEDURE — 84165 PROTEIN E-PHORESIS SERUM: CPT | Performed by: INTERNAL MEDICINE

## 2017-06-11 PROCEDURE — 80048 BASIC METABOLIC PNL TOTAL CA: CPT | Performed by: FAMILY MEDICINE

## 2017-06-11 PROCEDURE — 82306 VITAMIN D 25 HYDROXY: CPT | Performed by: INTERNAL MEDICINE

## 2017-06-11 PROCEDURE — 85025 COMPLETE CBC W/AUTO DIFF WBC: CPT | Performed by: FAMILY MEDICINE

## 2017-06-11 PROCEDURE — 86334 IMMUNOFIX E-PHORESIS SERUM: CPT | Performed by: INTERNAL MEDICINE

## 2017-06-11 RX ORDER — OMEGA-3S/DHA/EPA/FISH OIL/D3 300MG-1000
5000 CAPSULE ORAL DAILY
Status: DISCONTINUED | OUTPATIENT
Start: 2017-06-11 | End: 2017-06-11

## 2017-06-11 RX ORDER — ALBUTEROL SULFATE 2.5 MG/3ML
2.5 SOLUTION RESPIRATORY (INHALATION)
Status: DISCONTINUED | OUTPATIENT
Start: 2017-06-11 | End: 2017-06-12 | Stop reason: HOSPADM

## 2017-06-11 RX ADMIN — CITALOPRAM HYDROBROMIDE 20 MG: 20 TABLET ORAL at 09:09

## 2017-06-11 RX ADMIN — IRON SUCROSE 200 MG: 20 INJECTION, SOLUTION INTRAVENOUS at 13:58

## 2017-06-11 RX ADMIN — FUROSEMIDE 40 MG: 10 INJECTION, SOLUTION INTRAMUSCULAR; INTRAVENOUS at 20:17

## 2017-06-11 RX ADMIN — ALBUTEROL SULFATE 2.5 MG: 2.5 SOLUTION RESPIRATORY (INHALATION) at 07:05

## 2017-06-11 RX ADMIN — ALBUTEROL SULFATE 2.5 MG: 2.5 SOLUTION RESPIRATORY (INHALATION) at 10:45

## 2017-06-11 RX ADMIN — FUROSEMIDE 40 MG: 10 INJECTION, SOLUTION INTRAMUSCULAR; INTRAVENOUS at 09:08

## 2017-06-11 RX ADMIN — ALBUTEROL SULFATE 2.5 MG: 2.5 SOLUTION RESPIRATORY (INHALATION) at 22:26

## 2017-06-11 RX ADMIN — PANTOPRAZOLE SODIUM 40 MG: 40 TABLET, DELAYED RELEASE ORAL at 09:09

## 2017-06-11 RX ADMIN — ALBUTEROL SULFATE 2.5 MG: 2.5 SOLUTION RESPIRATORY (INHALATION) at 15:02

## 2017-06-11 RX ADMIN — CHOLECALCIFEROL CAP 125 MCG (5000 UNIT) 5000 UNITS: 125 CAP at 15:00

## 2017-06-11 RX ADMIN — ISOSORBIDE MONONITRATE 60 MG: 60 TABLET, EXTENDED RELEASE ORAL at 09:09

## 2017-06-11 RX ADMIN — METOPROLOL TARTRATE 100 MG: 100 TABLET, FILM COATED ORAL at 09:09

## 2017-06-11 RX ADMIN — METOPROLOL TARTRATE 100 MG: 100 TABLET, FILM COATED ORAL at 20:17

## 2017-06-11 NOTE — PROGRESS NOTES
"     LOS: 2 days     Chief Complaint:  Swelling and shortness of breath    Subjective     Interval History:     She complains of having persistent swelling but overall a little better.  She slept better last night.  She continued to complain about having bilateral lower extremity swelling.  No fevers or chills.  She does continue to have diarrhea.  INR continues to be greater than 4.      Objective     Vital Signs  /75 (BP Location: Left arm, Patient Position: Lying)  Pulse 90  Temp 98 °F (36.7 °C) (Oral)   Resp 22  Ht 67\" (170.2 cm)  Wt 195 lb 4 oz (88.6 kg)  SpO2 97%  BMI 30.58 kg/m2  Intake & Output (last day)       06/10 0701 - 06/11 0700 06/11 0701 - 06/12 0700    P.O. 1440     Total Intake(mL/kg) 1440 (16.3)     Urine (mL/kg/hr) 1850 (0.9)     Stool 0 (0)     Total Output 1850      Net -410                    Physical Exam:     General Appearance:    Alert, cooperative, in no acute distress   Head:    Normocephalic, without obvious abnormality, atraumatic   Eyes:            Lids and lashes normal, conjunctivae and sclerae normal, no   icterus, no pallor, corneas clear, PERRLA   Ears:    Ears appear intact with no abnormalities noted   Throat:   No oral lesions, no thrush, oral mucosa moist   Neck:   No adenopathy, supple, trachea midline, no thyromegaly, no   carotid bruit, no JVD   Lungs:     Bibasilar crackles.  Symmetric chest wall expansion     Heart:    Regular rhythm and normal rate, normal S1 and S2, no            murmur, no gallop, no rub, no click   Chest Wall:    No abnormalities observed   Abdomen:     Normal bowel sounds, no masses, no organomegaly, soft        non-tender, non-distended, no guarding, no rebound                tenderness   Extremities:   Moves all extremities well, 2+ bilateral lower extremity edema, no cyanosis, no redness   Pulses:   Pulses palpable and equal bilaterally   Skin:   No bleeding, bruising or rash   Lymph nodes:   No palpable adenopathy   Neurologic:   " Cranial nerves 2 - 12 grossly intact, sensation intact, DTR       present and equal bilaterally        Results Review:    Lab Results   Component Value Date    WBC 7.03 06/11/2017    HGB 8.3 (L) 06/11/2017    HCT 28.6 (L) 06/11/2017    MCV 77.7 (L) 06/11/2017     06/11/2017       Lab Results   Component Value Date    GLUCOSE 96 06/11/2017    BUN 26 (H) 06/11/2017    CREATININE 1.78 (H) 06/11/2017    EGFRIFNONA 27 (L) 06/11/2017    BCR 14.6 06/11/2017     06/11/2017    K 3.7 06/11/2017     06/11/2017    CO2 29.3 06/11/2017    CALCIUM 8.8 06/11/2017    ALBUMIN 4.00 06/09/2017    LABIL2 1.3 (L) 06/09/2017    AST 14 06/09/2017    ALT 13 06/09/2017     Lab Results   Component Value Date    INR 4.12 (H) 06/11/2017    INR 4.46 (C) 06/10/2017    INR 4.94 (C) 06/09/2017       No results found for: POCGLU       Medication Review:     Current Facility-Administered Medications:   •  albuterol (PROVENTIL) nebulizer solution 0.083% 2.5 mg/3mL, 2.5 mg, Nebulization, Q4H - RT, Samuel Duane Kreis, MD, 2.5 mg at 06/11/17 1045  •  ALPRAZolam (XANAX) tablet 1 mg, 1 mg, Oral, BID PRN, Samuel Duane Kreis, MD, 1 mg at 06/10/17 2201  •  cholecalciferol (VITAMIN D3) tablet 5,000 Units, 5,000 Units, Oral, Daily, Sebas Ellis MD  •  citalopram (CeleXA) tablet 20 mg, 20 mg, Oral, Daily, Samuel Duane Kreis, MD, 20 mg at 06/11/17 0909  •  furosemide (LASIX) injection 40 mg, 40 mg, Intravenous, Q12H, Sebas Ellis MD, 40 mg at 06/11/17 0908  •  Hold medication, 1 each, Does not apply, Continuous, Myles Barone MD  •  HYDROcodone-acetaminophen (NORCO) 7.5-325 MG per tablet 1 tablet, 1 tablet, Oral, BID PRN, Samuel Duane Kreis, MD  •  iron sucrose (VENOFER) 200 mg in sodium chloride 0.9 % 100 mL IVPB, 200 mg, Intravenous, Q24H, Sebas Ellis MD  •  isosorbide mononitrate (IMDUR) 24 hr tablet 60 mg, 60 mg, Oral, Q24H, Sebas Ellis MD, 60 mg at 06/11/17 0909  •  metoprolol tartrate (LOPRESSOR) tablet 100 mg, 100 mg, Oral, Q12H,  Sebas Ellis MD, 100 mg at 06/11/17 0909  •  pantoprazole (PROTONIX) EC tablet 40 mg, 40 mg, Oral, Daily, Samuel Duane Kreis, MD, 40 mg at 06/11/17 0909  •  sodium chloride 0.9 % flush 1-10 mL, 1-10 mL, Intravenous, PRN, Myles Barone MD      Assessment/Plan     Acute on chronic systolic congestive heart failure  Stage III chronic kidney disease  Hypertension, uncontrolled  Paroxysmal atrial fibrillation    INR daily.  Hold warfarin today.  Supratherapeutic INR    Continue Lopressor 100 mg twice a day.  Continue Imdur    Lasix 40 mg IV every 12 hours.  Renal failure so far stable    Nephrology input appreciated    No ACE inhibitor or angiotensin receptor blocker secondary to renal failure      Samuel Duane Kreis, MD  06/11/17  11:19 AM

## 2017-06-11 NOTE — PROGRESS NOTES
Nephrology Note      Subjective       She is feeling better.still has edema, swan placed for retention. Good urine output    Objective     Vital Signs  Temp:  [97.6 °F (36.4 °C)-98.9 °F (37.2 °C)] 98 °F (36.7 °C)  Heart Rate:  [76-80] 79  Resp:  [20-24] 22  BP: (122-156)/(61-83) 154/75       I/O last 3 completed shifts:  In: 1680 [P.O.:1680]  Out: 2250 [Urine:2250]    Physical Examination:    General Appearance : alert, appears stated age, cooperative and no distress  Head : normocephalic  Eyes :  no pallor  Throat : oral mucosa moist  Neck:  no JVD  Lungs : reduced breath sounds at bases, no crackles  Heart : regular rhythm & normal rate, normal S1, S2, no murmur, no david, no rub   Abdomen :  normal bowel sounds,  soft non-tender  Extremities :  1+ lower extremity edema  Neurologic : orientated to person, place, time, grossly no focal deficitis    Laboratory Data :      WBC WBC   Date Value Ref Range Status   06/11/2017 7.03 4.50 - 12.50 10*3/mm3 Final   06/09/2017 8.04 4.50 - 12.50 10*3/mm3 Final      HGB Hemoglobin   Date Value Ref Range Status   06/11/2017 8.3 (L) 12.0 - 16.0 g/dL Final   06/09/2017 9.4 (L) 12.0 - 16.0 g/dL Final      HCT Hematocrit   Date Value Ref Range Status   06/11/2017 28.6 (L) 37.0 - 47.0 % Final   06/09/2017 31.2 (L) 37.0 - 47.0 % Final      Platlets No results found for: LABPLAT   MCV MCV   Date Value Ref Range Status   06/11/2017 77.7 (L) 80.0 - 94.0 fL Final   06/09/2017 75.9 (L) 80.0 - 94.0 fL Final          Sodium Sodium   Date Value Ref Range Status   06/11/2017 140 135 - 153 mmol/L Final   06/09/2017 141 135 - 153 mmol/L Final      Potassium Potassium   Date Value Ref Range Status   06/11/2017 3.7 3.5 - 5.3 mmol/L Final   06/09/2017 3.8 3.5 - 5.3 mmol/L Final      Chloride Chloride   Date Value Ref Range Status   06/11/2017 104 99 - 112 mmol/L Final   06/09/2017 105 99 - 112 mmol/L Final      CO2 CO2   Date Value Ref Range Status   06/11/2017 29.3 24.3 - 31.9 mmol/L Final    06/09/2017 28.2 24.3 - 31.9 mmol/L Final      BUN BUN   Date Value Ref Range Status   06/11/2017 26 (H) 7 - 21 mg/dL Final   06/09/2017 27 (H) 7 - 21 mg/dL Final      Creatinine Creatinine   Date Value Ref Range Status   06/11/2017 1.78 (H) 0.43 - 1.29 mg/dL Final   06/09/2017 1.79 (H) 0.43 - 1.29 mg/dL Final      Calcium Calcium   Date Value Ref Range Status   06/11/2017 8.8 7.7 - 10.0 mg/dL Final   06/09/2017 9.6 7.7 - 10.0 mg/dL Final      PO4 No results found for: CAPO4   Albumin Albumin   Date Value Ref Range Status   06/09/2017 4.00 3.40 - 4.80 g/dL Final      Magnesium No results found for: MG   Uric Acid No results found for: URICACID     Radiology results :   Imaging Results (last 24 hours)     ** No results found for the last 24 hours. **              Medications:        albuterol 2.5 mg Nebulization Q4H - RT   cholecalciferol 5,000 Units Oral Daily   citalopram 20 mg Oral Daily   furosemide 40 mg Intravenous Q12H   iron sucrose (VENOFER) IVPB 200 mg Intravenous Q24H   isosorbide mononitrate 60 mg Oral Q24H   metoprolol tartrate 100 mg Oral Q12H   pantoprazole 40 mg Oral Daily       hold 1 each       Assessment/Plan     Active Problems:    Edema      1. CKD 3 : She has baseline creatinine of 1.6-1.9. Creatinine is stable at 1.7  renal US pending, urine protein 470 mg/gm only. multiple myeloma labs pending.    2. Systolic CHF exacerbation : improving, continue lasix 40 mg iv bid    3. HTN : keep /80s for renal perfusion    4. Anemia : low iron saturations, start venofer    5. Bone mineral disorder:  , 25 hydroxy vitamin d  Low, start Vit d     6. supratherapeutic INR : coumadin on hold  .  I discussed the patients findings and my recommendations with patient and nursing staff    Sebas Ellis MD  06/11/17  10:15 AM

## 2017-06-11 NOTE — PLAN OF CARE
Problem: Patient Care Overview (Adult)  Goal: Plan of Care Review  Outcome: Ongoing (interventions implemented as appropriate)    Problem: Fall Risk (Adult)  Goal: Identify Related Risk Factors and Signs and Symptoms  Outcome: Ongoing (interventions implemented as appropriate)    Problem: Pressure Ulcer Risk (Diogo Scale) (Adult,Obstetrics,Pediatric)  Goal: Identify Related Risk Factors and Signs and Symptoms  Outcome: Ongoing (interventions implemented as appropriate)  Goal: Skin Integrity  Outcome: Ongoing (interventions implemented as appropriate)    Problem: Infection, Risk/Actual (Adult)  Goal: Identify Related Risk Factors and Signs and Symptoms  Outcome: Ongoing (interventions implemented as appropriate)  Goal: Infection Prevention/Resolution  Outcome: Ongoing (interventions implemented as appropriate)    Problem: Fluid Volume Excess (Adult,Obstetrics,Pediatric)  Goal: Identify Related Risk Factors and Signs and Symptoms  Outcome: Ongoing (interventions implemented as appropriate)

## 2017-06-11 NOTE — PLAN OF CARE
Problem: Patient Care Overview (Adult)  Goal: Plan of Care Review  Outcome: Ongoing (interventions implemented as appropriate)    06/10/17 0005 06/10/17 2000   Patient Care Overview   Progress no change --    Coping/Psychosocial Response Interventions   Plan Of Care Reviewed With --  patient       Goal: Adult Individualization and Mutuality  Outcome: Ongoing (interventions implemented as appropriate)    Problem: Fall Risk (Adult)  Goal: Identify Related Risk Factors and Signs and Symptoms  Outcome: Ongoing (interventions implemented as appropriate)    06/10/17 0005   Fall Risk   Fall Risk: Related Risk Factors age-related changes;sleep pattern alteration   Fall Risk: Signs and Symptoms presence of risk factors       Goal: Absence of Falls  Outcome: Ongoing (interventions implemented as appropriate)    06/10/17 1323   Fall Risk (Adult)   Absence of Falls making progress toward outcome         Problem: Pressure Ulcer Risk (Diogo Scale) (Adult,Obstetrics,Pediatric)  Goal: Identify Related Risk Factors and Signs and Symptoms  Outcome: Ongoing (interventions implemented as appropriate)    06/10/17 0005   Pressure Ulcer Risk (Diogo Scale)   Related Risk Factors (Pressure Ulcer Risk (Diogo Scale)) age extremes;medication       Goal: Skin Integrity  Outcome: Ongoing (interventions implemented as appropriate)    06/10/17 1323   Pressure Ulcer Risk (Diogo Scale) (Adult,Obstetrics,Pediatric)   Skin Integrity making progress toward outcome         Problem: Infection, Risk/Actual (Adult)  Goal: Identify Related Risk Factors and Signs and Symptoms  Outcome: Ongoing (interventions implemented as appropriate)    06/10/17 0005   Infection, Risk/Actual   Infection, Risk/Actual: Related Risk Factors age extremes;sleep disturbance   Signs and Symptoms (Infection, Risk/Actual) vomiting;weakness       Goal: Infection Prevention/Resolution  Outcome: Ongoing (interventions implemented as appropriate)    06/10/17 1323   Infection,  Risk/Actual (Adult)   Infection Prevention/Resolution making progress toward outcome         Problem: Fluid Volume Excess (Adult,Obstetrics,Pediatric)  Goal: Identify Related Risk Factors and Signs and Symptoms  Outcome: Ongoing (interventions implemented as appropriate)    06/10/17 0005   Fluid Volume Excess   Fluid Volume Excess: Related Risk Factors medication effects   Signs and Symptoms (Fluid Volume Excess) activity intolerance       Goal: Stable Weight  Outcome: Ongoing (interventions implemented as appropriate)    06/10/17 1323   Fluid Volume Excess (Adult,Obstetrics,Pediatric)   Stable Weight making progress toward outcome       Goal: Balanced Intake/Output  Outcome: Ongoing (interventions implemented as appropriate)    06/10/17 1323   Fluid Volume Excess (Adult,Obstetrics,Pediatric)   Balanced Intake/Output making progress toward outcome

## 2017-06-12 ENCOUNTER — APPOINTMENT (OUTPATIENT)
Dept: ULTRASOUND IMAGING | Facility: HOSPITAL | Age: 82
End: 2017-06-12

## 2017-06-12 ENCOUNTER — HOSPITAL ENCOUNTER (INPATIENT)
Facility: HOSPITAL | Age: 82
LOS: 17 days | Discharge: HOME HEALTH | End: 2017-06-29
Attending: FAMILY MEDICINE | Admitting: FAMILY MEDICINE

## 2017-06-12 ENCOUNTER — APPOINTMENT (OUTPATIENT)
Dept: GENERAL RADIOLOGY | Facility: HOSPITAL | Age: 82
End: 2017-06-12

## 2017-06-12 VITALS
DIASTOLIC BLOOD PRESSURE: 78 MMHG | TEMPERATURE: 98.4 F | BODY MASS INDEX: 30.45 KG/M2 | HEART RATE: 82 BPM | OXYGEN SATURATION: 97 % | HEIGHT: 67 IN | WEIGHT: 194 LBS | RESPIRATION RATE: 22 BRPM | SYSTOLIC BLOOD PRESSURE: 135 MMHG

## 2017-06-12 DIAGNOSIS — N18.3 CKD (CHRONIC KIDNEY DISEASE), STAGE 3 (MODERATE): Primary | ICD-10-CM

## 2017-06-12 LAB
ALBUMIN SERPL-MCNC: 3 G/DL (ref 2.9–4.4)
ALBUMIN/GLOB SERPL: 1.2 {RATIO} (ref 0.7–1.7)
ALPHA1 GLOB FLD ELPH-MCNC: 0.3 G/DL (ref 0–0.4)
ALPHA2 GLOB SERPL ELPH-MCNC: 0.7 G/DL (ref 0.4–1)
ANION GAP SERPL CALCULATED.3IONS-SCNC: 7.3 MMOL/L (ref 3.6–11.2)
B-GLOBULIN SERPL ELPH-MCNC: 1.1 G/DL (ref 0.7–1.3)
BUN BLD-MCNC: 18 MG/DL (ref 7–21)
BUN/CREAT SERPL: 10.8 (ref 7–25)
CALCIUM SPEC-SCNC: 9.1 MG/DL (ref 7.7–10)
CHLORIDE SERPL-SCNC: 101 MMOL/L (ref 99–112)
CO2 SERPL-SCNC: 32.7 MMOL/L (ref 24.3–31.9)
CREAT BLD-MCNC: 1.67 MG/DL (ref 0.43–1.29)
GAMMA GLOB SERPL ELPH-MCNC: 0.6 G/DL (ref 0.4–1.8)
GFR SERPL CREATININE-BSD FRML MDRD: 29 ML/MIN/1.73
GLOBULIN SER CALC-MCNC: 2.7 G/DL (ref 2.2–3.9)
GLUCOSE BLD-MCNC: 99 MG/DL (ref 70–110)
IGA SERPL-MCNC: 353 MG/DL (ref 64–422)
IGG SERPL-MCNC: 673 MG/DL (ref 700–1600)
IGM SERPL-MCNC: 60 MG/DL (ref 26–217)
INR PPP: 2.51 (ref 0.8–1.1)
INTERPRETATION SERPL IEP-IMP: ABNORMAL
KAPPA LC SERPL-MCNC: 46.2 MG/L (ref 3.3–19.4)
KAPPA LC/LAMBDA SER: 1.46 {RATIO} (ref 0.26–1.65)
LAMBDA LC FREE SERPL-MCNC: 31.6 MG/L (ref 5.7–26.3)
Lab: ABNORMAL
M-SPIKE: ABNORMAL G/DL
OSMOLALITY SERPL CALC.SUM OF ELEC: 283.2 MOSM/KG (ref 273–305)
POTASSIUM BLD-SCNC: 3.4 MMOL/L (ref 3.5–5.3)
POTASSIUM BLD-SCNC: 3.8 MMOL/L (ref 3.5–5.3)
PROT SERPL-MCNC: 5.7 G/DL (ref 6–8.5)
PROTHROMBIN TIME: 29.6 SECONDS (ref 9.8–11.9)
SODIUM BLD-SCNC: 141 MMOL/L (ref 135–153)

## 2017-06-12 PROCEDURE — 94799 UNLISTED PULMONARY SVC/PX: CPT

## 2017-06-12 PROCEDURE — 76775 US EXAM ABDO BACK WALL LIM: CPT

## 2017-06-12 PROCEDURE — 80048 BASIC METABOLIC PNL TOTAL CA: CPT | Performed by: INTERNAL MEDICINE

## 2017-06-12 PROCEDURE — 85610 PROTHROMBIN TIME: CPT | Performed by: FAMILY MEDICINE

## 2017-06-12 PROCEDURE — 25010000002 IRON SUCROSE PER 1 MG: Performed by: INTERNAL MEDICINE

## 2017-06-12 PROCEDURE — 84132 ASSAY OF SERUM POTASSIUM: CPT | Performed by: FAMILY MEDICINE

## 2017-06-12 PROCEDURE — 73564 X-RAY EXAM KNEE 4 OR MORE: CPT | Performed by: RADIOLOGY

## 2017-06-12 PROCEDURE — 25010000002 FUROSEMIDE PER 20 MG: Performed by: FAMILY MEDICINE

## 2017-06-12 PROCEDURE — 76775 US EXAM ABDO BACK WALL LIM: CPT | Performed by: RADIOLOGY

## 2017-06-12 PROCEDURE — 71010 HC CHEST PA OR AP: CPT

## 2017-06-12 PROCEDURE — 25010000002 FUROSEMIDE PER 20 MG: Performed by: INTERNAL MEDICINE

## 2017-06-12 PROCEDURE — 71010 XR CHEST 1 VW: CPT | Performed by: RADIOLOGY

## 2017-06-12 PROCEDURE — 73564 X-RAY EXAM KNEE 4 OR MORE: CPT

## 2017-06-12 RX ORDER — ALBUTEROL SULFATE 2.5 MG/3ML
2.5 SOLUTION RESPIRATORY (INHALATION)
Status: CANCELLED | OUTPATIENT
Start: 2017-06-12

## 2017-06-12 RX ORDER — POTASSIUM CHLORIDE 7.45 MG/ML
10 INJECTION INTRAVENOUS
Status: CANCELLED | OUTPATIENT
Start: 2017-06-12

## 2017-06-12 RX ORDER — CITALOPRAM 20 MG/1
20 TABLET ORAL DAILY
Status: CANCELLED | OUTPATIENT
Start: 2017-06-13

## 2017-06-12 RX ORDER — HYDROCODONE BITARTRATE AND ACETAMINOPHEN 7.5; 325 MG/1; MG/1
1 TABLET ORAL 2 TIMES DAILY
Status: CANCELLED | OUTPATIENT
Start: 2017-06-12

## 2017-06-12 RX ORDER — POTASSIUM CHLORIDE 7.45 MG/ML
10 INJECTION INTRAVENOUS
Status: DISCONTINUED | OUTPATIENT
Start: 2017-06-12 | End: 2017-06-29 | Stop reason: HOSPADM

## 2017-06-12 RX ORDER — FUROSEMIDE 10 MG/ML
40 INJECTION INTRAMUSCULAR; INTRAVENOUS 2 TIMES DAILY
Status: DISCONTINUED | OUTPATIENT
Start: 2017-06-12 | End: 2017-06-15

## 2017-06-12 RX ORDER — CITALOPRAM 20 MG/1
20 TABLET ORAL DAILY
Status: DISCONTINUED | OUTPATIENT
Start: 2017-06-12 | End: 2017-06-12 | Stop reason: ALTCHOICE

## 2017-06-12 RX ORDER — WARFARIN SODIUM 3 MG/1
6 TABLET ORAL
Status: CANCELLED | OUTPATIENT
Start: 2017-06-12

## 2017-06-12 RX ORDER — ALBUTEROL SULFATE 2.5 MG/3ML
2.5 SOLUTION RESPIRATORY (INHALATION)
Status: DISCONTINUED | OUTPATIENT
Start: 2017-06-12 | End: 2017-06-13

## 2017-06-12 RX ORDER — ISOSORBIDE MONONITRATE 60 MG/1
60 TABLET, EXTENDED RELEASE ORAL
Status: DISCONTINUED | OUTPATIENT
Start: 2017-06-13 | End: 2017-06-29 | Stop reason: HOSPADM

## 2017-06-12 RX ORDER — ALPRAZOLAM 1 MG/1
1 TABLET ORAL 2 TIMES DAILY PRN
Status: DISCONTINUED | OUTPATIENT
Start: 2017-06-12 | End: 2017-06-19

## 2017-06-12 RX ORDER — CITALOPRAM 20 MG/1
20 TABLET ORAL DAILY
Status: DISCONTINUED | OUTPATIENT
Start: 2017-06-13 | End: 2017-06-29 | Stop reason: HOSPADM

## 2017-06-12 RX ORDER — ALPRAZOLAM 1 MG/1
1 TABLET ORAL 2 TIMES DAILY PRN
Status: CANCELLED | OUTPATIENT
Start: 2017-06-12

## 2017-06-12 RX ORDER — POTASSIUM CHLORIDE 1.5 G/1.77G
40 POWDER, FOR SOLUTION ORAL AS NEEDED
Status: DISCONTINUED | OUTPATIENT
Start: 2017-06-12 | End: 2017-06-12 | Stop reason: HOSPADM

## 2017-06-12 RX ORDER — METOPROLOL TARTRATE 100 MG/1
100 TABLET ORAL EVERY 12 HOURS SCHEDULED
Status: CANCELLED | OUTPATIENT
Start: 2017-06-12

## 2017-06-12 RX ORDER — PANTOPRAZOLE SODIUM 40 MG/1
40 TABLET, DELAYED RELEASE ORAL DAILY
Status: DISCONTINUED | OUTPATIENT
Start: 2017-06-12 | End: 2017-06-12 | Stop reason: ALTCHOICE

## 2017-06-12 RX ORDER — AMLODIPINE BESYLATE 10 MG/1
10 TABLET ORAL DAILY
Status: CANCELLED | OUTPATIENT
Start: 2017-06-12

## 2017-06-12 RX ORDER — ALPRAZOLAM 1 MG/1
1 TABLET ORAL 2 TIMES DAILY
Status: CANCELLED | OUTPATIENT
Start: 2017-06-12

## 2017-06-12 RX ORDER — POTASSIUM CHLORIDE 20 MEQ/1
40 TABLET, EXTENDED RELEASE ORAL EVERY 4 HOURS
Status: CANCELLED | OUTPATIENT
Start: 2017-06-12 | End: 2017-06-12

## 2017-06-12 RX ORDER — POTASSIUM CHLORIDE 750 MG/1
40 CAPSULE, EXTENDED RELEASE ORAL AS NEEDED
Status: CANCELLED | OUTPATIENT
Start: 2017-06-12

## 2017-06-12 RX ORDER — FUROSEMIDE 10 MG/ML
40 INJECTION INTRAMUSCULAR; INTRAVENOUS EVERY 12 HOURS SCHEDULED
Status: CANCELLED | OUTPATIENT
Start: 2017-06-12

## 2017-06-12 RX ORDER — PANTOPRAZOLE SODIUM 40 MG/1
40 TABLET, DELAYED RELEASE ORAL DAILY
Status: CANCELLED | OUTPATIENT
Start: 2017-06-13

## 2017-06-12 RX ORDER — HYDROCODONE BITARTRATE AND ACETAMINOPHEN 7.5; 325 MG/1; MG/1
1 TABLET ORAL 2 TIMES DAILY PRN
Status: CANCELLED | OUTPATIENT
Start: 2017-06-12

## 2017-06-12 RX ORDER — HYDROCODONE BITARTRATE AND ACETAMINOPHEN 7.5; 325 MG/1; MG/1
1 TABLET ORAL 2 TIMES DAILY PRN
Status: DISCONTINUED | OUTPATIENT
Start: 2017-06-12 | End: 2017-06-29 | Stop reason: HOSPADM

## 2017-06-12 RX ORDER — FUROSEMIDE 20 MG/1
20 TABLET ORAL DAILY
Status: CANCELLED | OUTPATIENT
Start: 2017-06-12

## 2017-06-12 RX ORDER — ALPRAZOLAM 1 MG/1
1 TABLET ORAL 2 TIMES DAILY
Status: DISCONTINUED | OUTPATIENT
Start: 2017-06-12 | End: 2017-06-17 | Stop reason: SDUPTHER

## 2017-06-12 RX ORDER — SODIUM CHLORIDE 0.9 % (FLUSH) 0.9 %
1-10 SYRINGE (ML) INJECTION AS NEEDED
Status: DISCONTINUED | OUTPATIENT
Start: 2017-06-12 | End: 2017-06-29 | Stop reason: HOSPADM

## 2017-06-12 RX ORDER — HYDROCODONE BITARTRATE AND ACETAMINOPHEN 7.5; 325 MG/1; MG/1
1 TABLET ORAL EVERY 12 HOURS SCHEDULED
Status: DISCONTINUED | OUTPATIENT
Start: 2017-06-12 | End: 2017-06-18

## 2017-06-12 RX ORDER — METOPROLOL TARTRATE 100 MG/1
100 TABLET ORAL 2 TIMES DAILY
Status: CANCELLED | OUTPATIENT
Start: 2017-06-12

## 2017-06-12 RX ORDER — POTASSIUM CHLORIDE 20 MEQ/1
40 TABLET, EXTENDED RELEASE ORAL EVERY 4 HOURS
Status: DISCONTINUED | OUTPATIENT
Start: 2017-06-12 | End: 2017-06-12 | Stop reason: HOSPADM

## 2017-06-12 RX ORDER — POTASSIUM CHLORIDE 7.45 MG/ML
10 INJECTION INTRAVENOUS
Status: DISCONTINUED | OUTPATIENT
Start: 2017-06-12 | End: 2017-06-12 | Stop reason: HOSPADM

## 2017-06-12 RX ORDER — METOPROLOL TARTRATE 100 MG/1
100 TABLET ORAL EVERY 12 HOURS SCHEDULED
Status: DISCONTINUED | OUTPATIENT
Start: 2017-06-12 | End: 2017-06-29 | Stop reason: HOSPADM

## 2017-06-12 RX ORDER — ONDANSETRON 2 MG/ML
4 INJECTION INTRAMUSCULAR; INTRAVENOUS EVERY 6 HOURS PRN
Status: DISCONTINUED | OUTPATIENT
Start: 2017-06-12 | End: 2017-06-29 | Stop reason: HOSPADM

## 2017-06-12 RX ORDER — AMLODIPINE BESYLATE 10 MG/1
10 TABLET ORAL DAILY
Status: DISCONTINUED | OUTPATIENT
Start: 2017-06-12 | End: 2017-06-19

## 2017-06-12 RX ORDER — POTASSIUM CHLORIDE 750 MG/1
40 CAPSULE, EXTENDED RELEASE ORAL AS NEEDED
Status: DISCONTINUED | OUTPATIENT
Start: 2017-06-12 | End: 2017-06-12 | Stop reason: HOSPADM

## 2017-06-12 RX ORDER — WARFARIN SODIUM 3 MG/1
6 TABLET ORAL
Status: DISCONTINUED | OUTPATIENT
Start: 2017-06-13 | End: 2017-06-17

## 2017-06-12 RX ORDER — METOPROLOL TARTRATE 100 MG/1
100 TABLET ORAL 2 TIMES DAILY
Status: DISCONTINUED | OUTPATIENT
Start: 2017-06-12 | End: 2017-06-12 | Stop reason: ALTCHOICE

## 2017-06-12 RX ORDER — POTASSIUM CHLORIDE 20 MEQ/1
40 TABLET, EXTENDED RELEASE ORAL AS NEEDED
Status: DISCONTINUED | OUTPATIENT
Start: 2017-06-12 | End: 2017-06-29 | Stop reason: HOSPADM

## 2017-06-12 RX ORDER — PANTOPRAZOLE SODIUM 40 MG/1
40 TABLET, DELAYED RELEASE ORAL DAILY
Status: CANCELLED | OUTPATIENT
Start: 2017-06-12

## 2017-06-12 RX ORDER — POTASSIUM CHLORIDE 1.5 G/1.77G
40 POWDER, FOR SOLUTION ORAL AS NEEDED
Status: CANCELLED | OUTPATIENT
Start: 2017-06-12

## 2017-06-12 RX ORDER — PANTOPRAZOLE SODIUM 40 MG/1
40 TABLET, DELAYED RELEASE ORAL DAILY
Status: DISCONTINUED | OUTPATIENT
Start: 2017-06-13 | End: 2017-06-29 | Stop reason: HOSPADM

## 2017-06-12 RX ORDER — CITALOPRAM 20 MG/1
20 TABLET ORAL DAILY
Status: CANCELLED | OUTPATIENT
Start: 2017-06-12

## 2017-06-12 RX ORDER — POTASSIUM CHLORIDE 20 MEQ/1
40 TABLET, EXTENDED RELEASE ORAL EVERY 4 HOURS
Status: COMPLETED | OUTPATIENT
Start: 2017-06-12 | End: 2017-06-12

## 2017-06-12 RX ORDER — ISOSORBIDE MONONITRATE 60 MG/1
60 TABLET, EXTENDED RELEASE ORAL DAILY
Status: CANCELLED | OUTPATIENT
Start: 2017-06-12

## 2017-06-12 RX ORDER — ISOSORBIDE MONONITRATE 60 MG/1
60 TABLET, EXTENDED RELEASE ORAL
Status: CANCELLED | OUTPATIENT
Start: 2017-06-13

## 2017-06-12 RX ORDER — POTASSIUM CHLORIDE 1.5 G/1.77G
40 POWDER, FOR SOLUTION ORAL AS NEEDED
Status: DISCONTINUED | OUTPATIENT
Start: 2017-06-12 | End: 2017-06-29 | Stop reason: HOSPADM

## 2017-06-12 RX ORDER — SODIUM CHLORIDE 0.9 % (FLUSH) 0.9 %
1-10 SYRINGE (ML) INJECTION AS NEEDED
Status: CANCELLED | OUTPATIENT
Start: 2017-06-12

## 2017-06-12 RX ORDER — ISOSORBIDE MONONITRATE 60 MG/1
60 TABLET, EXTENDED RELEASE ORAL DAILY
Status: DISCONTINUED | OUTPATIENT
Start: 2017-06-12 | End: 2017-06-12 | Stop reason: ALTCHOICE

## 2017-06-12 RX ORDER — FUROSEMIDE 20 MG/1
20 TABLET ORAL DAILY
Status: DISCONTINUED | OUTPATIENT
Start: 2017-06-12 | End: 2017-06-12 | Stop reason: ALTCHOICE

## 2017-06-12 RX ADMIN — METOPROLOL TARTRATE 100 MG: 100 TABLET, FILM COATED ORAL at 09:15

## 2017-06-12 RX ADMIN — HYDROCODONE BITARTRATE AND ACETAMINOPHEN 1 TABLET: 7.5; 325 TABLET ORAL at 20:58

## 2017-06-12 RX ADMIN — CITALOPRAM HYDROBROMIDE 20 MG: 20 TABLET ORAL at 09:15

## 2017-06-12 RX ADMIN — ALBUTEROL SULFATE 2.5 MG: 2.5 SOLUTION RESPIRATORY (INHALATION) at 23:50

## 2017-06-12 RX ADMIN — TUBERCULIN PURIFIED PROTEIN DERIVATIVE 5 UNITS: 5 INJECTION, SOLUTION INTRADERMAL at 18:00

## 2017-06-12 RX ADMIN — POTASSIUM CHLORIDE 40 MEQ: 1500 TABLET, EXTENDED RELEASE ORAL at 16:34

## 2017-06-12 RX ADMIN — IRON SUCROSE 200 MG: 20 INJECTION, SOLUTION INTRAVENOUS at 11:54

## 2017-06-12 RX ADMIN — ISOSORBIDE MONONITRATE 60 MG: 60 TABLET, EXTENDED RELEASE ORAL at 09:15

## 2017-06-12 RX ADMIN — ALBUTEROL SULFATE 2.5 MG: 2.5 SOLUTION RESPIRATORY (INHALATION) at 15:47

## 2017-06-12 RX ADMIN — CHOLECALCIFEROL CAP 125 MCG (5000 UNIT) 5000 UNITS: 125 CAP at 16:34

## 2017-06-12 RX ADMIN — FUROSEMIDE 40 MG: 10 INJECTION, SOLUTION INTRAMUSCULAR; INTRAVENOUS at 09:16

## 2017-06-12 RX ADMIN — POTASSIUM CHLORIDE 40 MEQ: 1500 TABLET, EXTENDED RELEASE ORAL at 13:33

## 2017-06-12 RX ADMIN — AMLODIPINE BESYLATE 10 MG: 10 TABLET ORAL at 19:05

## 2017-06-12 RX ADMIN — PANTOPRAZOLE SODIUM 40 MG: 40 TABLET, DELAYED RELEASE ORAL at 09:15

## 2017-06-12 RX ADMIN — ALBUTEROL SULFATE 2.5 MG: 2.5 SOLUTION RESPIRATORY (INHALATION) at 06:41

## 2017-06-12 RX ADMIN — FUROSEMIDE 40 MG: 10 INJECTION, SOLUTION INTRAMUSCULAR; INTRAVENOUS at 18:00

## 2017-06-12 RX ADMIN — POTASSIUM CHLORIDE 40 MEQ: 1500 TABLET, EXTENDED RELEASE ORAL at 20:58

## 2017-06-12 RX ADMIN — ALPRAZOLAM 1 MG: 1 TABLET ORAL at 21:51

## 2017-06-12 NOTE — PROGRESS NOTES
Nephrology Note      Subjective     She has lost 12 lbs, edema much better. Breathing is better    Objective     Vital Signs  Temp:  [97.8 °F (36.6 °C)-99.7 °F (37.6 °C)] 98.7 °F (37.1 °C)  Heart Rate:  [78-80] 79  Resp:  [18-24] 18  BP: (122-163)/(59-90) 135/78    I/O this shift:  In: 120 [P.O.:120]  Out: 1000 [Urine:1000]  I/O last 3 completed shifts:  In: 1680 [P.O.:1680]  Out: 5250 [Urine:5250]    Physical Examination:    General Appearance : alert, no distress  Head : normocephalic  Eyes :  no pallor  Throat : oral mucosa moist  Neck:  no JVD  Lungs : reduced breath sounds at bases  Heart :  normal S1, S2, no murmur  Abdomen :   soft non-tender  Extremities :  1+ lower extremity edema - better  Neurologic : orientated to person, place, time, grossly no focal deficitis    Laboratory Data :      WBC WBC   Date Value Ref Range Status   06/11/2017 7.03 4.50 - 12.50 10*3/mm3 Final   06/09/2017 8.04 4.50 - 12.50 10*3/mm3 Final      HGB Hemoglobin   Date Value Ref Range Status   06/11/2017 8.3 (L) 12.0 - 16.0 g/dL Final   06/09/2017 9.4 (L) 12.0 - 16.0 g/dL Final      HCT Hematocrit   Date Value Ref Range Status   06/11/2017 28.6 (L) 37.0 - 47.0 % Final   06/09/2017 31.2 (L) 37.0 - 47.0 % Final      Platlets No results found for: LABPLAT   MCV MCV   Date Value Ref Range Status   06/11/2017 77.7 (L) 80.0 - 94.0 fL Final   06/09/2017 75.9 (L) 80.0 - 94.0 fL Final          Sodium Sodium   Date Value Ref Range Status   06/12/2017 141 135 - 153 mmol/L Final   06/11/2017 140 135 - 153 mmol/L Final   06/09/2017 141 135 - 153 mmol/L Final      Potassium Potassium   Date Value Ref Range Status   06/12/2017 3.4 (L) 3.5 - 5.3 mmol/L Final   06/11/2017 3.7 3.5 - 5.3 mmol/L Final   06/09/2017 3.8 3.5 - 5.3 mmol/L Final      Chloride Chloride   Date Value Ref Range Status   06/12/2017 101 99 - 112 mmol/L Final   06/11/2017 104 99 - 112 mmol/L Final   06/09/2017 105 99 - 112 mmol/L Final      CO2 CO2   Date Value Ref Range Status    06/12/2017 32.7 (H) 24.3 - 31.9 mmol/L Final   06/11/2017 29.3 24.3 - 31.9 mmol/L Final   06/09/2017 28.2 24.3 - 31.9 mmol/L Final      BUN BUN   Date Value Ref Range Status   06/12/2017 18 7 - 21 mg/dL Final   06/11/2017 26 (H) 7 - 21 mg/dL Final   06/09/2017 27 (H) 7 - 21 mg/dL Final      Creatinine Creatinine   Date Value Ref Range Status   06/12/2017 1.67 (H) 0.43 - 1.29 mg/dL Final   06/11/2017 1.78 (H) 0.43 - 1.29 mg/dL Final   06/09/2017 1.79 (H) 0.43 - 1.29 mg/dL Final      Calcium Calcium   Date Value Ref Range Status   06/12/2017 9.1 7.7 - 10.0 mg/dL Final   06/11/2017 8.8 7.7 - 10.0 mg/dL Final   06/09/2017 9.6 7.7 - 10.0 mg/dL Final      PO4 No results found for: CAPO4   Albumin Albumin   Date Value Ref Range Status   06/09/2017 4.00 3.40 - 4.80 g/dL Final      Magnesium No results found for: MG   Uric Acid No results found for: URICACID     Radiology results :     Imaging Results (last 24 hours)     Procedure Component Value Units Date/Time    XR Chest 1 View [[id number]] Collected:  06/12/17 0703     Updated:  06/12/17 0706    Narrative:       EXAMINATION: XR CHEST 1 VW-      CLINICAL INDICATION:     chf     TECHNIQUE:  XR CHEST 1 VW-      COMPARISON: 6/9/2017      FINDINGS:   Increasing perihilar edema. Persistent left basilar airspace disease.   Cardiomegaly and pulmonary vascular congestion again noted.   No pneumothorax.   Right greater than left pleural effusions probably not significant  change.   No acute osseous findings.  Left cardiac pacer unchanged in appearance and positioning.       Impression:       Overall slight increase in changes of CHF/edema. Otherwise  stable chest.     This report was finalized on 6/12/2017 7:04 AM by Dr. Andres Guerra MD.       XR Knee 4+ View Left [[id number]] Collected:  06/12/17 0917     Updated:  06/12/17 0924    Narrative:       EXAMINATION: XR KNEE 1 OR 2 VW LEFT-      CLINICAL INDICATION:pain/immobile        COMPARISON: 06/20/2012      TECHNIQUE: 4 views  left knee     FINDINGS:   Mild-moderate osteoarthritis medial and lateral compartments. No  fracture or dislocation. Soft tissue edema noted. No evidence of joint  effusion.       Impression:       Degenerative changes and soft tissue edema. No acute osseous or  articular abnormality.     This report was finalized on 6/12/2017 9:17 AM by Dr. Andres Guerra MD.                 Medications:        albuterol 2.5 mg Nebulization Q8H - RT   citalopram 20 mg Oral Daily   furosemide 40 mg Intravenous Q12H   iron sucrose (VENOFER) IVPB 200 mg Intravenous Q24H   isosorbide mononitrate 60 mg Oral Q24H   metoprolol tartrate 100 mg Oral Q12H   pantoprazole 40 mg Oral Daily   vitamin d 5,000 Units Oral Daily       hold 1 each       Assessment/Plan     Active Problems:    Edema      1. CKD 3 : She has baseline creatinine of 1.6-1.9. Creatinine is better at 1.6  renal US pending, urine protein 470 mg/gm only. multiple myeloma labs pending.    2. Systolic CHF exacerbation :clinically improving but CXR shows worsening edema, reviewed images personally, continue lasix 40 mg iv bid    3. HTN : keep /80s for renal perfusion    4. Anemia : low iron saturations, on venofer    5. Bone mineral disorder:  , 25 hydroxy vitamin d  Low, on Vit d     I discussed the patients findings and my recommendations with patient and nursing staff    Sebas Ellis MD  06/12/17  11:05 AM

## 2017-06-12 NOTE — PROGRESS NOTES
Tre Kinney 84 y.o.   06/12/17    Subjective: Patient currently feels better with less shortness of breath.  Has not been able to sit up in a chair due to pain in her left knee and below the knee  Objective: Vital signs stable abdomen soft tenderness of left knee  Vital Signs (last 72 hrs)       06/08 0700  -  06/09 0659 06/09 0700  -  06/10 0659 06/10 0700  -  06/11 0659 06/11 0700  -  06/12 0616   Most Recent    Temp (°F)   97.5 -  97.9    97.6 -  98.9    97.8 -  99.7     99.7 (37.6)    Heart Rate   72 -  89    78 -  81    76 -  90     79    Resp   19 -  24    20 -  22    18 -  24     18    BP   146/72 -  164/89    122/61 -  174/93    122/65 -  163/85     163/85    SpO2 (%)   90 -  98    (!)88 -  98    (!)79 -  97     91        Lab Results (last 72 hours)     Procedure Component Value Units Date/Time    Blood Gas, Arterial [[id number]]  (Abnormal) Collected:  06/09/17 1254    Specimen:  Arterial Blood Updated:  06/09/17 1306     Site Arterial: right brachial     Devang's Test N/A     pH, Arterial 7.428 pH units      pCO2, Arterial 31.6 (L) mm Hg      pO2, Arterial 57.2 (L) mm Hg      HCO3, Arterial 20.4 (L) mmol/L      Base Excess, Arterial -3.2 mmol/L      O2 Saturation, Arterial 88.0 (L) %      Hemoglobin, Blood Gas 10.2 (L) g/dL      Hematocrit, Blood Gas 30.0 (L) %      Oxyhemoglobin 86.7 %      Methemoglobin 0.4 %      Carboxyhemoglobin 1.1 %      A-a Gradiant 123.1 mmHg      Temperature 98.6 C      Barometric Pressure for Blood Gas 726 mmHg      Modality Cannula - Nasal     FIO2 32 %     CBC Auto Differential [[id number]]  (Abnormal) Collected:  06/09/17 1250    Specimen:  Blood Updated:  06/09/17 1318     WBC 8.04 10*3/mm3      RBC 4.11 (L) 10*6/mm3      Hemoglobin 9.4 (L) g/dL      Hematocrit 31.2 (L) %      MCV 75.9 (L) fL      MCH 22.9 (L) pg      MCHC 30.1 (L) g/dL      RDW 18.7 (H) %      RDW-SD 48.6 fl      MPV 9.9 fL      Platelets 258 10*3/mm3      Neutrophil % 87.9 (H) %      Lymphocyte % 5.5 (L) %       Monocyte % 5.8 %      Eosinophil % 0.1 %      Basophil % 0.5 %      Immature Grans % 0.2 %      Neutrophils, Absolute 7.06 (H) 10*3/mm3      Lymphocytes, Absolute 0.44 (L) 10*3/mm3      Monocytes, Absolute 0.47 10*3/mm3      Eosinophils, Absolute 0.01 10*3/mm3      Basophils, Absolute 0.04 10*3/mm3      Immature Grans, Absolute 0.02 10*3/mm3     Comprehensive Metabolic Panel [[id number]]  (Abnormal) Collected:  06/09/17 1250    Specimen:  Blood Updated:  06/09/17 1342     Glucose 130 (H) mg/dL      BUN 27 (H) mg/dL      Creatinine 1.79 (H) mg/dL      Sodium 141 mmol/L      Potassium 3.8 mmol/L      Chloride 105 mmol/L      CO2 28.2 mmol/L      Calcium 9.6 mg/dL      Total Protein 7.2 g/dL      Albumin 4.00 g/dL      ALT (SGPT) 13 U/L      AST (SGOT) 14 U/L      Alkaline Phosphatase 60 U/L       Note New Reference Ranges        Total Bilirubin 1.2 mg/dL      eGFR Non African Amer 27 (L) mL/min/1.73      Globulin 3.2 gm/dL      A/G Ratio 1.3 (L) g/dL      BUN/Creatinine Ratio 15.1     Anion Gap 7.8 mmol/L     Narrative:       The MDRD GFR formula is only valid for adults with stable renal function between ages 18 and 70.    Osmolality, Calculated [[id number]]  (Normal) Collected:  06/09/17 1250    Specimen:  Blood Updated:  06/09/17 1342     Osmolality Calc 288.1 mOsm/kg     BNP [[id number]]  (Abnormal) Collected:  06/09/17 1250    Specimen:  Blood Updated:  06/09/17 1350     BNP 2486.0 (H) pg/mL     Protime-INR [[id number]]  (Abnormal) Collected:  06/09/17 1250    Specimen:  Blood Updated:  06/09/17 1352     Protime 60.7 (H) Seconds      INR 4.94 (C)    Narrative:       Patients not on anticoagulant therapy:    INR 0.90-1.10     Suggested INR therapeutic range for stable oral anticoagulant therapy:             Routine therapy                      2.00-3.00           Recurrent MI                         2.50-3.50           Mechanical prosthetic valve          2.50-3.50    Protime-INR [[id number]]  (Abnormal) Collected:   06/10/17 1123    Specimen:  Blood Updated:  06/10/17 1209     Protime 54.5 (H) Seconds      INR 4.46 (C)    Narrative:       Patients not on anticoagulant therapy:    INR 0.90-1.10     Suggested INR therapeutic range for stable oral anticoagulant therapy:             Routine therapy                      2.00-3.00           Recurrent MI                         2.50-3.50           Mechanical prosthetic valve          2.50-3.50    Urinalysis With / Microscopic If Indicated [[id number]]  (Abnormal) Collected:  06/10/17 1412    Specimen:  Urine from Urine, Clean Catch Updated:  06/10/17 1424     Color, UA Dark Yellow (A)     Appearance, UA Clear     pH, UA 5.5     Specific Gravity, UA 1.019     Glucose, UA Negative     Ketones, UA Negative     Bilirubin, UA Negative     Blood, UA Moderate (2+) (A)     Protein, UA 30 mg/dL (1+) (A)     Leuk Esterase, UA Negative     Nitrite, UA Negative     Urobilinogen, UA 1.0 E.U./dL    Urinalysis, Microscopic Only [[id number]]  (Abnormal) Collected:  06/10/17 1412    Specimen:  Urine from Urine, Clean Catch Updated:  06/10/17 1424     RBC, UA 3-5 (A) /HPF      WBC, UA 0-2 (A) /HPF      Bacteria, UA None Seen /HPF      Squamous Epithelial Cells, UA 0-2 /HPF      Hyaline Casts, UA None Seen /LPF      Methodology Automated Microscopy    Protein / Creatinine Ratio, Urine [[id number]]  (Abnormal) Collected:  06/10/17 1412    Specimen:  Urine from Urine, Clean Catch Updated:  06/10/17 1430     Protein/Creatinine Ratio, Urine 470.2 (H) mg/G Crea      Creatinine, Urine 114.0 mg/dL      Total Protein, Urine 53.6 mg/dL     Microalbumin / Creatinine Urine Ratio [[id number]] Collected:  06/10/17 1412    Specimen:  Urine from Urine, Clean Catch Updated:  06/10/17 1450     Microalbumin/Creatinine Ratio 237.6 mg/g      Creatinine, Urine 114.0 mg/dL      Microalbumin, Urine 270.9 mg/L     CBC & Differential [[id number]] Collected:  06/11/17 0115    Specimen:  Blood Updated:  06/11/17 0143    Narrative:        The following orders were created for panel order CBC & Differential.  Procedure                               Abnormality         Status                     ---------                               -----------         ------                     CBC Auto Differential[[id number]]        Abnormal            Final result                 Please view results for these tests on the individual orders.    CBC Auto Differential [[id number]]  (Abnormal) Collected:  06/11/17 0115    Specimen:  Blood Updated:  06/11/17 0143     WBC 7.03 10*3/mm3      RBC 3.68 (L) 10*6/mm3      Hemoglobin 8.3 (L) g/dL      Hematocrit 28.6 (L) %      MCV 77.7 (L) fL      MCH 22.6 (L) pg      MCHC 29.0 (L) g/dL      RDW 18.9 (H) %      RDW-SD 50.3 fl      MPV 9.8 fL      Platelets 229 10*3/mm3      Neutrophil % 76.9 (H) %      Lymphocyte % 11.1 (L) %      Monocyte % 10.4 %      Eosinophil % 0.9 %      Basophil % 0.6 %      Immature Grans % 0.1 %      Neutrophils, Absolute 5.41 10*3/mm3      Lymphocytes, Absolute 0.78 (L) 10*3/mm3      Monocytes, Absolute 0.73 10*3/mm3      Eosinophils, Absolute 0.06 10*3/mm3      Basophils, Absolute 0.04 10*3/mm3      Immature Grans, Absolute 0.01 10*3/mm3     JUDY and PE,Serum [[id number]] Collected:  06/11/17 0115    Specimen:  Blood Updated:  06/11/17 0150    Protime-INR [[id number]]  (Abnormal) Collected:  06/11/17 0115    Specimen:  Blood Updated:  06/11/17 0152     Protime 50.1 (H) Seconds      INR 4.12 (H)    Narrative:       Patients not on anticoagulant therapy:    INR 0.90-1.10     Suggested INR therapeutic range for stable oral anticoagulant therapy:             Routine therapy                      2.00-3.00           Recurrent MI                         2.50-3.50           Mechanical prosthetic valve          2.50-3.50    Phosphorus [[id number]]  (Normal) Collected:  06/11/17 0115    Specimen:  Blood Updated:  06/11/17 0205     Phosphorus 4.1 mg/dL     Osmolality, Calculated [[id number]]  (Normal)  Collected:  06/11/17 0115    Specimen:  Blood Updated:  06/11/17 0205     Osmolality Calc 284.0 mOsm/kg     Iron Profile [[id number]]  (Abnormal) Collected:  06/11/17 0115    Specimen:  Blood Updated:  06/11/17 0205     Iron 17 (L) mcg/dL      TIBC 364 mcg/dL      Iron Saturation 5 (L) %     Basic Metabolic Panel [[id number]]  (Abnormal) Collected:  06/11/17 0115    Specimen:  Blood Updated:  06/11/17 0205     Glucose 96 mg/dL      BUN 26 (H) mg/dL      Creatinine 1.78 (H) mg/dL      Sodium 140 mmol/L      Potassium 3.7 mmol/L      Chloride 104 mmol/L      CO2 29.3 mmol/L      Calcium 8.8 mg/dL      eGFR Non African Amer 27 (L) mL/min/1.73      BUN/Creatinine Ratio 14.6     Anion Gap 6.7 mmol/L     Narrative:       The MDRD GFR formula is only valid for adults with stable renal function between ages 18 and 70.    PTH, Intact [[id number]]  (Abnormal) Collected:  06/11/17 0115    Specimen:  Blood Updated:  06/11/17 0208     PTH, Intact 291.1 (H) pg/mL     Narrative:       Please note the potential for biotin to falsely depress the PTH result when high levels of biotin surpassing the daily recommended dose are administered.    Ferritin [[id number]]  (Normal) Collected:  06/11/17 0115    Specimen:  Blood Updated:  06/11/17 0213     Ferritin 22.00 ng/mL     Vitamin D 25 Hydroxy [[id number]] Collected:  06/11/17 0115    Specimen:  Blood Updated:  06/11/17 0213     25 Hydroxy, Vitamin D 13.0 ng/ml     Narrative:       Reference Ranges for Total Vitamin D 25(OH)    Deficiency      <20.0 ng/ml  Insufficiency   20-30 ng/ml  Sufficiency     ng/ml  Toxicity         >100 ng/ml    Protime-INR [[id number]]  (Abnormal) Collected:  06/12/17 0118    Specimen:  Blood Updated:  06/12/17 0203     Protime 29.6 (H) Seconds      INR 2.51 (H)    Narrative:       Patients not on anticoagulant therapy:    INR 0.90-1.10     Suggested INR therapeutic range for stable oral anticoagulant therapy:             Routine therapy                       2.00-3.00           Recurrent MI                         2.50-3.50           Mechanical prosthetic valve          2.50-3.50        Imaging Results (last 24 hours)     ** No results found for the last 24 hours. **        Assessment:Active Problems:    Edema   Painful left lower extremity/acute on chronic systolic congestive heart failure/chronic kidney disease 3  Plan: X-ray left knee didn't consider physical therapy for patient does have progressive functional decline

## 2017-06-12 NOTE — PLAN OF CARE
Problem: Patient Care Overview (Adult)  Goal: Plan of Care Review  Outcome: Ongoing (interventions implemented as appropriate)    06/12/17 0047   Patient Care Overview   Progress progress toward functional goals as expected   Coping/Psychosocial Response Interventions   Plan Of Care Reviewed With patient       Goal: Adult Individualization and Mutuality  Outcome: Ongoing (interventions implemented as appropriate)    Problem: Fall Risk (Adult)  Goal: Identify Related Risk Factors and Signs and Symptoms  Outcome: Ongoing (interventions implemented as appropriate)    06/10/17 0005   Fall Risk   Fall Risk: Related Risk Factors age-related changes;sleep pattern alteration   Fall Risk: Signs and Symptoms presence of risk factors       Goal: Absence of Falls  Outcome: Ongoing (interventions implemented as appropriate)    06/12/17 0047   Fall Risk (Adult)   Absence of Falls making progress toward outcome         Problem: Pressure Ulcer Risk (Diogo Scale) (Adult,Obstetrics,Pediatric)  Goal: Identify Related Risk Factors and Signs and Symptoms  Outcome: Ongoing (interventions implemented as appropriate)    06/10/17 0005   Pressure Ulcer Risk (Diogo Scale)   Related Risk Factors (Pressure Ulcer Risk (Diogo Scale)) age extremes;medication       Goal: Skin Integrity  Outcome: Ongoing (interventions implemented as appropriate)    06/12/17 0047   Pressure Ulcer Risk (Diogo Scale) (Adult,Obstetrics,Pediatric)   Skin Integrity making progress toward outcome         Problem: Infection, Risk/Actual (Adult)  Goal: Identify Related Risk Factors and Signs and Symptoms  Outcome: Ongoing (interventions implemented as appropriate)    06/12/17 0047   Infection, Risk/Actual   Infection, Risk/Actual: Related Risk Factors chronic illness/condition;tissue perfusion altered   Signs and Symptoms (Infection, Risk/Actual) edema;respiratory rate increase;weakness       Goal: Infection Prevention/Resolution  Outcome: Ongoing (interventions  implemented as appropriate)    06/12/17 0047   Infection, Risk/Actual (Adult)   Infection Prevention/Resolution making progress toward outcome         Problem: Fluid Volume Excess (Adult,Obstetrics,Pediatric)  Goal: Identify Related Risk Factors and Signs and Symptoms  Outcome: Ongoing (interventions implemented as appropriate)    06/12/17 0047   Fluid Volume Excess   Fluid Volume Excess: Related Risk Factors disease process;organ failure   Signs and Symptoms (Fluid Volume Excess) edema;pulmonary congestion/pleural effusion;respiratory pattern changes       Goal: Stable Weight  Outcome: Ongoing (interventions implemented as appropriate)    06/12/17 0047   Fluid Volume Excess (Adult,Obstetrics,Pediatric)   Stable Weight making progress toward outcome       Goal: Balanced Intake/Output  Outcome: Ongoing (interventions implemented as appropriate)    06/12/17 0047   Fluid Volume Excess (Adult,Obstetrics,Pediatric)   Balanced Intake/Output making progress toward outcome

## 2017-06-12 NOTE — PROGRESS NOTES
Nutrition Services    Patient Name:  Tre Kinney  YOB: 1932  MRN: [id number]  Admit Date:  6/9/2017        Diet liberalized to regular with milkshake BID secondary to  pt c/o decreased appetite, dislikes supplements, limited food preferences and advanced age      Electronically signed by:  Ileana Villa RD  06/12/17 2:48 PM

## 2017-06-12 NOTE — DISCHARGE PLACEMENT REQUEST
Case Management/Social Work    Patient Name:  Tre Kinney  YOB: 1932  MRN: [id number]  Admit Date:  6/9/2017    Consult received for Swing Bed services.  Patient meets criteria and has SNF days.     Electronically signed by:  Hannah Moctezuma RN  06/12/17 2:16 PM

## 2017-06-12 NOTE — PLAN OF CARE
Problem: Patient Care Overview (Adult)  Goal: Discharge Needs Assessment  Outcome: Ongoing (interventions implemented as appropriate)  Discharge Planning Assessment   [place]     Patient Name: Tre Kinney                    MRN: [id number]  Today's Date: 6/12/2017                     Admit Date: 6/9/2017             Discharge Needs Assessment        06/12/17 0909     Living Environment     Lives With child(abner), adult   Pt lives with sonValdez.      Transportation Available family or friend will provide     Living Environment     Quality Of Family Relationships supportive     Able to Return to Prior Living Arrangements yes     Discharge Needs Assessment     Equipment Currently Used at Home oxygen;walker, rolling;commode   Pt has home O2 @ 2 liters, rolling walker and bedside commode from [facility].      Equipment Needed After Discharge none     Discharge Facility/Level Of Care Needs --   Pt does not utilize home health services. Pt has utilized services in the past and son request at discharge. Pt lives in [facility]      Discharge Disposition home healthcare service       [facility]       Discharge Plan        06/12/17 0911     Case Management/Social Work Plan     Plan Pt lives at home alone and family plan for pt to return home at discharge. Pt does not utilize home health services. Son request home health services at discharge. Pt lives in [facility] HH to be arranged with MD orders. Pt has home O2 @ 2 liters, rolling walker and bedside commode from [facility]. SS to follow and assist as needed with discharge planning.      Patient/Family In Agreement With Plan yes       [facility]       Demographic Summary        06/12/17 0907     Referral Information     Referral Source nursing;physician     Reason For Consult --   SS received consult discharge planning, DME and swing bed eval. SS contacted sonPhillip at [phone].      Primary Care Physician Information     Name Dr. Barone and pt also  see's a heart specialist in [place].              Legal        06/12/17 9808     Legal     Legal Comments Pt does not have a POA or living will. Son, Phillip states pt has received information about a living will in the past. Pt has two son's, Valdez and Phillip Kinney.       Erin Quevedo

## 2017-06-12 NOTE — PLAN OF CARE
Problem: Patient Care Overview (Adult)  Goal: Plan of Care Review  Outcome: Ongoing (interventions implemented as appropriate)    06/12/17 1113   Patient Care Overview   Progress no change   Coping/Psychosocial Response Interventions   Plan Of Care Reviewed With patient         Problem: Fall Risk (Adult)  Goal: Identify Related Risk Factors and Signs and Symptoms  Outcome: Ongoing (interventions implemented as appropriate)    06/10/17 0005   Fall Risk   Fall Risk: Related Risk Factors age-related changes;sleep pattern alteration   Fall Risk: Signs and Symptoms presence of risk factors         Problem: Pressure Ulcer Risk (Diogo Scale) (Adult,Obstetrics,Pediatric)  Goal: Identify Related Risk Factors and Signs and Symptoms  Outcome: Ongoing (interventions implemented as appropriate)    06/10/17 0005   Pressure Ulcer Risk (Diogo Scale)   Related Risk Factors (Pressure Ulcer Risk (Diogo Scale)) age extremes;medication         Problem: Infection, Risk/Actual (Adult)  Goal: Identify Related Risk Factors and Signs and Symptoms  Outcome: Ongoing (interventions implemented as appropriate)    06/12/17 0047   Infection, Risk/Actual   Infection, Risk/Actual: Related Risk Factors chronic illness/condition;tissue perfusion altered   Signs and Symptoms (Infection, Risk/Actual) edema;respiratory rate increase;weakness         Problem: Fluid Volume Excess (Adult,Obstetrics,Pediatric)  Goal: Identify Related Risk Factors and Signs and Symptoms    06/12/17 0047   Fluid Volume Excess   Fluid Volume Excess: Related Risk Factors disease process;organ failure   Signs and Symptoms (Fluid Volume Excess) edema;pulmonary congestion/pleural effusion;respiratory pattern changes

## 2017-06-12 NOTE — PROGRESS NOTES
Discharge Planning Assessment  [facility]     Patient Name: Tre Kinney  MRN: [id number]  Today's Date: 6/12/2017    Admit Date: 6/9/2017          Discharge Needs Assessment       06/12/17 0909    Living Environment    Lives With child(abner), adult   Pt lives with sonValdez.     Transportation Available family or friend will provide    Living Environment    Quality Of Family Relationships supportive    Able to Return to Prior Living Arrangements yes    Discharge Needs Assessment    Equipment Currently Used at Home oxygen;walker, rolling;commode   Pt has home O2 @ 2 liters, rolling walker and bedside commode from [facility].     Equipment Needed After Discharge none    Discharge Facility/Level Of Care Needs --   Pt does not utilize home health services. Pt has utilized services in the past and son request at discharge. Pt lives in [facility]     Discharge Disposition home healthcare service            Discharge Plan       06/12/17 0911    Case Management/Social Work Plan    Plan Swing bed nurse will review. Pt lives at home alone and family plan for pt to return home at discharge. Pt does not utilize home health services. Son request home health services at discharge. Pt lives in [facility] HH to be arranged with MD orders. Pt has home O2 @ 2 liters, rolling walker and bedside commode from [facility]. SS to follow and assist as needed with discharge planning.     Patient/Family In Agreement With Plan yes             Demographic Summary       06/12/17 0907    Referral Information    Referral Source nursing;physician    Reason For Consult --   SS received consult discharge planning, DME and swing bed eval. SS contacted Phillip bird at [phone].     Primary Care Physician Information    Name Dr. Barone and pt also see's a heart specialist in [place].       Legal       06/12/17 0908    Legal    Legal Comments Pt does not have a POA or living will. Phillip Bird states pt has received information about a  living will in the past. Pt has two son's, Valdez and Phillip Kinney.      Erin Quevedo

## 2017-06-12 NOTE — PHARMACY RECOMMENDATION
Patient admitted to hospital with supratherapeutic INR. INR is now 2.51. Please assess if warfarin needs to be restarted.    Thank you,    Steffanie Santana RPH

## 2017-06-12 NOTE — DISCHARGE PLACEMENT REQUEST
Case Management/Social Work    Patient Name:  Tre Kinney  YOB: 1932  MRN: [id number]  Admit Date:  6/9/2017    Consult received for Swing Bed services.  Patient meets criteria with RT X 7 days a week dn IV medication.  Awaiting insurance verification.     Electronically signed by:  Hannah Moctezuma RN  06/12/17 10:32 AM

## 2017-06-13 LAB
ANION GAP SERPL CALCULATED.3IONS-SCNC: 3.9 MMOL/L (ref 3.6–11.2)
BUN BLD-MCNC: 19 MG/DL (ref 7–21)
BUN/CREAT SERPL: 10.8 (ref 7–25)
CALCIUM SPEC-SCNC: 9 MG/DL (ref 7.7–10)
CHLORIDE SERPL-SCNC: 102 MMOL/L (ref 99–112)
CO2 SERPL-SCNC: 32.1 MMOL/L (ref 24.3–31.9)
CREAT BLD-MCNC: 1.76 MG/DL (ref 0.43–1.29)
GFR SERPL CREATININE-BSD FRML MDRD: 28 ML/MIN/1.73
GLUCOSE BLD-MCNC: 95 MG/DL (ref 70–110)
INR PPP: 1.45 (ref 0.8–1.1)
OSMOLALITY SERPL CALC.SUM OF ELEC: 277.7 MOSM/KG (ref 273–305)
POTASSIUM BLD-SCNC: 4 MMOL/L (ref 3.5–5.3)
PROTHROMBIN TIME: 16.5 SECONDS (ref 9.8–11.9)
SODIUM BLD-SCNC: 138 MMOL/L (ref 135–153)

## 2017-06-13 PROCEDURE — G8988 SELF CARE GOAL STATUS: HCPCS

## 2017-06-13 PROCEDURE — 97110 THERAPEUTIC EXERCISES: CPT

## 2017-06-13 PROCEDURE — 94799 UNLISTED PULMONARY SVC/PX: CPT

## 2017-06-13 PROCEDURE — 80048 BASIC METABOLIC PNL TOTAL CA: CPT | Performed by: FAMILY MEDICINE

## 2017-06-13 PROCEDURE — G8987 SELF CARE CURRENT STATUS: HCPCS

## 2017-06-13 PROCEDURE — 85610 PROTHROMBIN TIME: CPT | Performed by: FAMILY MEDICINE

## 2017-06-13 PROCEDURE — G8979 MOBILITY GOAL STATUS: HCPCS

## 2017-06-13 PROCEDURE — G8978 MOBILITY CURRENT STATUS: HCPCS

## 2017-06-13 PROCEDURE — 25010000002 FUROSEMIDE PER 20 MG: Performed by: FAMILY MEDICINE

## 2017-06-13 PROCEDURE — 97530 THERAPEUTIC ACTIVITIES: CPT

## 2017-06-13 PROCEDURE — 25010000002 IRON SUCROSE PER 1 MG: Performed by: FAMILY MEDICINE

## 2017-06-13 PROCEDURE — 97163 PT EVAL HIGH COMPLEX 45 MIN: CPT

## 2017-06-13 PROCEDURE — 97167 OT EVAL HIGH COMPLEX 60 MIN: CPT

## 2017-06-13 RX ORDER — ALBUTEROL SULFATE 2.5 MG/3ML
2.5 SOLUTION RESPIRATORY (INHALATION)
Status: DISCONTINUED | OUTPATIENT
Start: 2017-06-13 | End: 2017-06-22

## 2017-06-13 RX ADMIN — WARFARIN SODIUM 6 MG: 3 TABLET ORAL at 17:40

## 2017-06-13 RX ADMIN — ALPRAZOLAM 1 MG: 1 TABLET ORAL at 17:40

## 2017-06-13 RX ADMIN — HYDROCODONE BITARTRATE AND ACETAMINOPHEN 1 TABLET: 7.5; 325 TABLET ORAL at 08:58

## 2017-06-13 RX ADMIN — IRON SUCROSE 200 MG: 20 INJECTION, SOLUTION INTRAVENOUS at 11:34

## 2017-06-13 RX ADMIN — FUROSEMIDE 40 MG: 10 INJECTION, SOLUTION INTRAMUSCULAR; INTRAVENOUS at 08:58

## 2017-06-13 RX ADMIN — ALBUTEROL SULFATE 2.5 MG: 2.5 SOLUTION RESPIRATORY (INHALATION) at 18:53

## 2017-06-13 RX ADMIN — HYDROCODONE BITARTRATE AND ACETAMINOPHEN 1 TABLET: 7.5; 325 TABLET ORAL at 20:13

## 2017-06-13 RX ADMIN — PANTOPRAZOLE SODIUM 40 MG: 40 TABLET, DELAYED RELEASE ORAL at 08:58

## 2017-06-13 RX ADMIN — FUROSEMIDE 40 MG: 10 INJECTION, SOLUTION INTRAMUSCULAR; INTRAVENOUS at 17:40

## 2017-06-13 RX ADMIN — ALPRAZOLAM 1 MG: 1 TABLET ORAL at 08:59

## 2017-06-13 RX ADMIN — ALBUTEROL SULFATE 2.5 MG: 2.5 SOLUTION RESPIRATORY (INHALATION) at 06:57

## 2017-06-13 RX ADMIN — CITALOPRAM HYDROBROMIDE 20 MG: 20 TABLET ORAL at 08:58

## 2017-06-14 LAB
ANION GAP SERPL CALCULATED.3IONS-SCNC: 10.6 MMOL/L (ref 3.6–11.2)
BASOPHILS # BLD AUTO: 0.03 10*3/MM3 (ref 0–0.3)
BASOPHILS # BLD AUTO: 0.05 10*3/MM3 (ref 0–0.3)
BASOPHILS NFR BLD AUTO: 0.4 % (ref 0–2)
BASOPHILS NFR BLD AUTO: 0.6 % (ref 0–2)
BUN BLD-MCNC: 24 MG/DL (ref 7–21)
BUN/CREAT SERPL: 12.6 (ref 7–25)
CALCIUM SPEC-SCNC: 9.1 MG/DL (ref 7.7–10)
CHLORIDE SERPL-SCNC: 100 MMOL/L (ref 99–112)
CO2 SERPL-SCNC: 30.4 MMOL/L (ref 24.3–31.9)
CREAT BLD-MCNC: 1.91 MG/DL (ref 0.43–1.29)
DEPRECATED RDW RBC AUTO: 50.6 FL (ref 37–54)
DEPRECATED RDW RBC AUTO: 52.4 FL (ref 37–54)
EOSINOPHIL # BLD AUTO: 0.14 10*3/MM3 (ref 0–0.7)
EOSINOPHIL # BLD AUTO: 0.26 10*3/MM3 (ref 0–0.7)
EOSINOPHIL NFR BLD AUTO: 1.7 % (ref 0–7)
EOSINOPHIL NFR BLD AUTO: 3.3 % (ref 0–7)
ERYTHROCYTE [DISTWIDTH] IN BLOOD BY AUTOMATED COUNT: 19.1 % (ref 11.5–14.5)
ERYTHROCYTE [DISTWIDTH] IN BLOOD BY AUTOMATED COUNT: 19.3 % (ref 11.5–14.5)
GFR SERPL CREATININE-BSD FRML MDRD: 25 ML/MIN/1.73
GLUCOSE BLD-MCNC: 103 MG/DL (ref 70–110)
HCT VFR BLD AUTO: 29.7 % (ref 37–47)
HCT VFR BLD AUTO: 31.3 % (ref 37–47)
HGB BLD-MCNC: 9 G/DL (ref 12–16)
HGB BLD-MCNC: 9.3 G/DL (ref 12–16)
IMM GRANULOCYTES # BLD: 0.02 10*3/MM3 (ref 0–0.03)
IMM GRANULOCYTES # BLD: 0.02 10*3/MM3 (ref 0–0.03)
IMM GRANULOCYTES NFR BLD: 0.2 % (ref 0–0.5)
IMM GRANULOCYTES NFR BLD: 0.3 % (ref 0–0.5)
INR PPP: 1.32 (ref 0.8–1.1)
LYMPHOCYTES # BLD AUTO: 0.76 10*3/MM3 (ref 1–3)
LYMPHOCYTES # BLD AUTO: 0.96 10*3/MM3 (ref 1–3)
LYMPHOCYTES NFR BLD AUTO: 12.3 % (ref 16–46)
LYMPHOCYTES NFR BLD AUTO: 9.1 % (ref 16–46)
MCH RBC QN AUTO: 23 PG (ref 27–33)
MCH RBC QN AUTO: 23.7 PG (ref 27–33)
MCHC RBC AUTO-ENTMCNC: 29.7 G/DL (ref 33–37)
MCHC RBC AUTO-ENTMCNC: 30.3 G/DL (ref 33–37)
MCV RBC AUTO: 77.3 FL (ref 80–94)
MCV RBC AUTO: 78.2 FL (ref 80–94)
MONOCYTES # BLD AUTO: 0.66 10*3/MM3 (ref 0.1–0.9)
MONOCYTES # BLD AUTO: 0.73 10*3/MM3 (ref 0.1–0.9)
MONOCYTES NFR BLD AUTO: 8.5 % (ref 0–12)
MONOCYTES NFR BLD AUTO: 8.7 % (ref 0–12)
NEUTROPHILS # BLD AUTO: 5.86 10*3/MM3 (ref 1.4–6.5)
NEUTROPHILS # BLD AUTO: 6.69 10*3/MM3 (ref 1.4–6.5)
NEUTROPHILS NFR BLD AUTO: 75.2 % (ref 40–75)
NEUTROPHILS NFR BLD AUTO: 79.7 % (ref 40–75)
OSMOLALITY SERPL CALC.SUM OF ELEC: 285.6 MOSM/KG (ref 273–305)
PLATELET # BLD AUTO: 233 10*3/MM3 (ref 130–400)
PLATELET # BLD AUTO: 264 10*3/MM3 (ref 130–400)
PMV BLD AUTO: 10.1 FL (ref 6–10)
PMV BLD AUTO: 9.5 FL (ref 6–10)
POTASSIUM BLD-SCNC: 4.1 MMOL/L (ref 3.5–5.3)
PROTHROMBIN TIME: 14.9 SECONDS (ref 9.8–11.9)
RBC # BLD AUTO: 3.8 10*6/MM3 (ref 4.2–5.4)
RBC # BLD AUTO: 4.05 10*6/MM3 (ref 4.2–5.4)
SODIUM BLD-SCNC: 141 MMOL/L (ref 135–153)
WBC NRBC COR # BLD: 7.79 10*3/MM3 (ref 4.5–12.5)
WBC NRBC COR # BLD: 8.39 10*3/MM3 (ref 4.5–12.5)

## 2017-06-14 PROCEDURE — 85025 COMPLETE CBC W/AUTO DIFF WBC: CPT | Performed by: FAMILY MEDICINE

## 2017-06-14 PROCEDURE — 25010000002 IRON SUCROSE PER 1 MG: Performed by: FAMILY MEDICINE

## 2017-06-14 PROCEDURE — 25010000002 FUROSEMIDE PER 20 MG: Performed by: FAMILY MEDICINE

## 2017-06-14 PROCEDURE — 94799 UNLISTED PULMONARY SVC/PX: CPT

## 2017-06-14 PROCEDURE — 85610 PROTHROMBIN TIME: CPT | Performed by: FAMILY MEDICINE

## 2017-06-14 PROCEDURE — 97530 THERAPEUTIC ACTIVITIES: CPT

## 2017-06-14 PROCEDURE — 71010 HC CHEST PA OR AP: CPT

## 2017-06-14 PROCEDURE — 80048 BASIC METABOLIC PNL TOTAL CA: CPT | Performed by: FAMILY MEDICINE

## 2017-06-14 PROCEDURE — 97110 THERAPEUTIC EXERCISES: CPT

## 2017-06-14 RX ADMIN — HYDROCODONE BITARTRATE AND ACETAMINOPHEN 1 TABLET: 7.5; 325 TABLET ORAL at 08:12

## 2017-06-14 RX ADMIN — HYDROCODONE BITARTRATE AND ACETAMINOPHEN 1 TABLET: 7.5; 325 TABLET ORAL at 20:21

## 2017-06-14 RX ADMIN — IRON SUCROSE 200 MG: 20 INJECTION, SOLUTION INTRAVENOUS at 12:55

## 2017-06-14 RX ADMIN — METOPROLOL TARTRATE 100 MG: 100 TABLET, FILM COATED ORAL at 08:12

## 2017-06-14 RX ADMIN — CITALOPRAM HYDROBROMIDE 20 MG: 20 TABLET ORAL at 08:12

## 2017-06-14 RX ADMIN — ALBUTEROL SULFATE 2.5 MG: 2.5 SOLUTION RESPIRATORY (INHALATION) at 19:22

## 2017-06-14 RX ADMIN — PANTOPRAZOLE SODIUM 40 MG: 40 TABLET, DELAYED RELEASE ORAL at 08:12

## 2017-06-14 RX ADMIN — AMLODIPINE BESYLATE 10 MG: 10 TABLET ORAL at 08:12

## 2017-06-14 RX ADMIN — ISOSORBIDE MONONITRATE 60 MG: 60 TABLET, EXTENDED RELEASE ORAL at 08:12

## 2017-06-14 RX ADMIN — ALBUTEROL SULFATE 2.5 MG: 2.5 SOLUTION RESPIRATORY (INHALATION) at 08:27

## 2017-06-14 RX ADMIN — WARFARIN SODIUM 6 MG: 3 TABLET ORAL at 17:36

## 2017-06-14 RX ADMIN — CHOLECALCIFEROL CAP 125 MCG (5000 UNIT) 5000 UNITS: 125 CAP at 08:11

## 2017-06-14 RX ADMIN — FUROSEMIDE 40 MG: 10 INJECTION, SOLUTION INTRAMUSCULAR; INTRAVENOUS at 17:36

## 2017-06-14 RX ADMIN — FUROSEMIDE 40 MG: 10 INJECTION, SOLUTION INTRAMUSCULAR; INTRAVENOUS at 08:11

## 2017-06-15 LAB
ANION GAP SERPL CALCULATED.3IONS-SCNC: 5.6 MMOL/L (ref 3.6–11.2)
BUN BLD-MCNC: 22 MG/DL (ref 7–21)
BUN/CREAT SERPL: 11.1 (ref 7–25)
CALCIUM SPEC-SCNC: 9.5 MG/DL (ref 7.7–10)
CHLORIDE SERPL-SCNC: 98 MMOL/L (ref 99–112)
CO2 SERPL-SCNC: 34.4 MMOL/L (ref 24.3–31.9)
CREAT BLD-MCNC: 1.99 MG/DL (ref 0.43–1.29)
GFR SERPL CREATININE-BSD FRML MDRD: 24 ML/MIN/1.73
GLUCOSE BLD-MCNC: 90 MG/DL (ref 70–110)
INR PPP: 1.53 (ref 0.8–1.1)
OSMOLALITY SERPL CALC.SUM OF ELEC: 278.5 MOSM/KG (ref 273–305)
POTASSIUM BLD-SCNC: 3.8 MMOL/L (ref 3.5–5.3)
PROTHROMBIN TIME: 17.5 SECONDS (ref 9.8–11.9)
SODIUM BLD-SCNC: 138 MMOL/L (ref 135–153)

## 2017-06-15 PROCEDURE — 80048 BASIC METABOLIC PNL TOTAL CA: CPT | Performed by: FAMILY MEDICINE

## 2017-06-15 PROCEDURE — 97530 THERAPEUTIC ACTIVITIES: CPT

## 2017-06-15 PROCEDURE — 25010000002 FUROSEMIDE PER 20 MG: Performed by: FAMILY MEDICINE

## 2017-06-15 PROCEDURE — 85610 PROTHROMBIN TIME: CPT | Performed by: FAMILY MEDICINE

## 2017-06-15 PROCEDURE — 94799 UNLISTED PULMONARY SVC/PX: CPT

## 2017-06-15 PROCEDURE — 25010000002 IRON SUCROSE PER 1 MG: Performed by: FAMILY MEDICINE

## 2017-06-15 PROCEDURE — 93970 EXTREMITY STUDY: CPT

## 2017-06-15 PROCEDURE — 97535 SELF CARE MNGMENT TRAINING: CPT

## 2017-06-15 RX ORDER — FUROSEMIDE 40 MG/1
40 TABLET ORAL 2 TIMES DAILY
Status: DISCONTINUED | OUTPATIENT
Start: 2017-06-15 | End: 2017-06-16

## 2017-06-15 RX ADMIN — ALPRAZOLAM 1 MG: 1 TABLET ORAL at 20:41

## 2017-06-15 RX ADMIN — CITALOPRAM HYDROBROMIDE 20 MG: 20 TABLET ORAL at 09:08

## 2017-06-15 RX ADMIN — FUROSEMIDE 40 MG: 10 INJECTION, SOLUTION INTRAMUSCULAR; INTRAVENOUS at 09:07

## 2017-06-15 RX ADMIN — IRON SUCROSE 200 MG: 20 INJECTION, SOLUTION INTRAVENOUS at 12:33

## 2017-06-15 RX ADMIN — PANTOPRAZOLE SODIUM 40 MG: 40 TABLET, DELAYED RELEASE ORAL at 09:08

## 2017-06-15 RX ADMIN — AMLODIPINE BESYLATE 10 MG: 10 TABLET ORAL at 09:08

## 2017-06-15 RX ADMIN — CHOLECALCIFEROL CAP 125 MCG (5000 UNIT) 5000 UNITS: 125 CAP at 09:08

## 2017-06-15 RX ADMIN — FUROSEMIDE 40 MG: 40 TABLET ORAL at 17:39

## 2017-06-15 RX ADMIN — METOPROLOL TARTRATE 100 MG: 100 TABLET, FILM COATED ORAL at 20:41

## 2017-06-15 RX ADMIN — METOPROLOL TARTRATE 100 MG: 100 TABLET, FILM COATED ORAL at 09:08

## 2017-06-15 RX ADMIN — WARFARIN SODIUM 6 MG: 3 TABLET ORAL at 17:39

## 2017-06-15 RX ADMIN — HYDROCODONE BITARTRATE AND ACETAMINOPHEN 1 TABLET: 7.5; 325 TABLET ORAL at 20:42

## 2017-06-15 RX ADMIN — ALBUTEROL SULFATE 2.5 MG: 2.5 SOLUTION RESPIRATORY (INHALATION) at 20:16

## 2017-06-15 RX ADMIN — ALBUTEROL SULFATE 2.5 MG: 2.5 SOLUTION RESPIRATORY (INHALATION) at 06:46

## 2017-06-15 RX ADMIN — ISOSORBIDE MONONITRATE 60 MG: 60 TABLET, EXTENDED RELEASE ORAL at 09:07

## 2017-06-15 RX ADMIN — HYDROCODONE BITARTRATE AND ACETAMINOPHEN 1 TABLET: 7.5; 325 TABLET ORAL at 09:07

## 2017-06-16 LAB
ANION GAP SERPL CALCULATED.3IONS-SCNC: 6.1 MMOL/L (ref 3.6–11.2)
BUN BLD-MCNC: 24 MG/DL (ref 7–21)
BUN/CREAT SERPL: 11.9 (ref 7–25)
CALCIUM SPEC-SCNC: 9.3 MG/DL (ref 7.7–10)
CHLORIDE SERPL-SCNC: 97 MMOL/L (ref 99–112)
CO2 SERPL-SCNC: 35.9 MMOL/L (ref 24.3–31.9)
CREAT BLD-MCNC: 2.02 MG/DL (ref 0.43–1.29)
GFR SERPL CREATININE-BSD FRML MDRD: 23 ML/MIN/1.73
GLUCOSE BLD-MCNC: 99 MG/DL (ref 70–110)
INR PPP: 1.96 (ref 0.8–1.1)
OSMOLALITY SERPL CALC.SUM OF ELEC: 281.6 MOSM/KG (ref 273–305)
POTASSIUM BLD-SCNC: 3.6 MMOL/L (ref 3.5–5.3)
PROTHROMBIN TIME: 22.7 SECONDS (ref 9.8–11.9)
SODIUM BLD-SCNC: 139 MMOL/L (ref 135–153)

## 2017-06-16 PROCEDURE — 80048 BASIC METABOLIC PNL TOTAL CA: CPT | Performed by: FAMILY MEDICINE

## 2017-06-16 PROCEDURE — 97110 THERAPEUTIC EXERCISES: CPT

## 2017-06-16 PROCEDURE — 85610 PROTHROMBIN TIME: CPT | Performed by: FAMILY MEDICINE

## 2017-06-16 PROCEDURE — 97535 SELF CARE MNGMENT TRAINING: CPT

## 2017-06-16 PROCEDURE — 97530 THERAPEUTIC ACTIVITIES: CPT

## 2017-06-16 PROCEDURE — 25010000002 ONDANSETRON PER 1 MG: Performed by: FAMILY MEDICINE

## 2017-06-16 PROCEDURE — 94799 UNLISTED PULMONARY SVC/PX: CPT

## 2017-06-16 PROCEDURE — 97116 GAIT TRAINING THERAPY: CPT

## 2017-06-16 RX ORDER — FUROSEMIDE 40 MG/1
40 TABLET ORAL DAILY
Status: DISCONTINUED | OUTPATIENT
Start: 2017-06-16 | End: 2017-06-17

## 2017-06-16 RX ORDER — TAMSULOSIN HYDROCHLORIDE 0.4 MG/1
0.4 CAPSULE ORAL DAILY
Status: DISCONTINUED | OUTPATIENT
Start: 2017-06-16 | End: 2017-06-29 | Stop reason: HOSPADM

## 2017-06-16 RX ADMIN — ALPRAZOLAM 1 MG: 1 TABLET ORAL at 20:06

## 2017-06-16 RX ADMIN — ISOSORBIDE MONONITRATE 60 MG: 60 TABLET, EXTENDED RELEASE ORAL at 09:16

## 2017-06-16 RX ADMIN — ONDANSETRON 4 MG: 2 INJECTION, SOLUTION INTRAMUSCULAR; INTRAVENOUS at 06:19

## 2017-06-16 RX ADMIN — FUROSEMIDE 40 MG: 40 TABLET ORAL at 09:15

## 2017-06-16 RX ADMIN — AMLODIPINE BESYLATE 10 MG: 10 TABLET ORAL at 09:16

## 2017-06-16 RX ADMIN — PANTOPRAZOLE SODIUM 40 MG: 40 TABLET, DELAYED RELEASE ORAL at 09:16

## 2017-06-16 RX ADMIN — TAMSULOSIN HYDROCHLORIDE 0.4 MG: 0.4 CAPSULE ORAL at 12:23

## 2017-06-16 RX ADMIN — METOPROLOL TARTRATE 100 MG: 100 TABLET, FILM COATED ORAL at 09:16

## 2017-06-16 RX ADMIN — ALBUTEROL SULFATE 2.5 MG: 2.5 SOLUTION RESPIRATORY (INHALATION) at 19:27

## 2017-06-16 RX ADMIN — CITALOPRAM HYDROBROMIDE 20 MG: 20 TABLET ORAL at 09:16

## 2017-06-16 RX ADMIN — ALBUTEROL SULFATE 2.5 MG: 2.5 SOLUTION RESPIRATORY (INHALATION) at 07:04

## 2017-06-16 RX ADMIN — HYDROCODONE BITARTRATE AND ACETAMINOPHEN 1 TABLET: 7.5; 325 TABLET ORAL at 20:06

## 2017-06-16 RX ADMIN — HYDROCODONE BITARTRATE AND ACETAMINOPHEN 1 TABLET: 7.5; 325 TABLET ORAL at 09:18

## 2017-06-16 RX ADMIN — WARFARIN SODIUM 6 MG: 3 TABLET ORAL at 17:34

## 2017-06-16 RX ADMIN — CHOLECALCIFEROL CAP 125 MCG (5000 UNIT) 5000 UNITS: 125 CAP at 09:15

## 2017-06-17 LAB
ANION GAP SERPL CALCULATED.3IONS-SCNC: 3.5 MMOL/L (ref 3.6–11.2)
BUN BLD-MCNC: 24 MG/DL (ref 7–21)
BUN/CREAT SERPL: 10.2 (ref 7–25)
CALCIUM SPEC-SCNC: 9 MG/DL (ref 7.7–10)
CHLORIDE SERPL-SCNC: 94 MMOL/L (ref 99–112)
CO2 SERPL-SCNC: 37.5 MMOL/L (ref 24.3–31.9)
CREAT BLD-MCNC: 2.36 MG/DL (ref 0.43–1.29)
GFR SERPL CREATININE-BSD FRML MDRD: 20 ML/MIN/1.73
GLUCOSE BLD-MCNC: 99 MG/DL (ref 70–110)
INR PPP: 2.6 (ref 0.8–1.1)
OSMOLALITY SERPL CALC.SUM OF ELEC: 274.2 MOSM/KG (ref 273–305)
POTASSIUM BLD-SCNC: 3.5 MMOL/L (ref 3.5–5.3)
PROTHROMBIN TIME: 30.7 SECONDS (ref 9.8–11.9)
SODIUM BLD-SCNC: 135 MMOL/L (ref 135–153)

## 2017-06-17 PROCEDURE — 97116 GAIT TRAINING THERAPY: CPT

## 2017-06-17 PROCEDURE — 85610 PROTHROMBIN TIME: CPT | Performed by: FAMILY MEDICINE

## 2017-06-17 PROCEDURE — 97530 THERAPEUTIC ACTIVITIES: CPT

## 2017-06-17 PROCEDURE — 94799 UNLISTED PULMONARY SVC/PX: CPT

## 2017-06-17 PROCEDURE — 80048 BASIC METABOLIC PNL TOTAL CA: CPT | Performed by: FAMILY MEDICINE

## 2017-06-17 RX ORDER — ALPRAZOLAM 1 MG/1
1 TABLET ORAL EVERY 12 HOURS SCHEDULED
Status: DISCONTINUED | OUTPATIENT
Start: 2017-06-17 | End: 2017-06-18

## 2017-06-17 RX ADMIN — ISOSORBIDE MONONITRATE 60 MG: 60 TABLET, EXTENDED RELEASE ORAL at 08:37

## 2017-06-17 RX ADMIN — ALBUTEROL SULFATE 2.5 MG: 2.5 SOLUTION RESPIRATORY (INHALATION) at 19:49

## 2017-06-17 RX ADMIN — METOPROLOL TARTRATE 100 MG: 100 TABLET, FILM COATED ORAL at 08:37

## 2017-06-17 RX ADMIN — ALPRAZOLAM 1 MG: 1 TABLET ORAL at 20:36

## 2017-06-17 RX ADMIN — HYDROCODONE BITARTRATE AND ACETAMINOPHEN 1 TABLET: 7.5; 325 TABLET ORAL at 08:38

## 2017-06-17 RX ADMIN — AMLODIPINE BESYLATE 10 MG: 10 TABLET ORAL at 08:37

## 2017-06-17 RX ADMIN — TAMSULOSIN HYDROCHLORIDE 0.4 MG: 0.4 CAPSULE ORAL at 08:37

## 2017-06-17 RX ADMIN — HYDROCODONE BITARTRATE AND ACETAMINOPHEN 1 TABLET: 7.5; 325 TABLET ORAL at 20:36

## 2017-06-17 RX ADMIN — METOPROLOL TARTRATE 100 MG: 100 TABLET, FILM COATED ORAL at 20:36

## 2017-06-17 RX ADMIN — CITALOPRAM HYDROBROMIDE 20 MG: 20 TABLET ORAL at 08:37

## 2017-06-17 RX ADMIN — PANTOPRAZOLE SODIUM 40 MG: 40 TABLET, DELAYED RELEASE ORAL at 08:37

## 2017-06-17 RX ADMIN — ALBUTEROL SULFATE 2.5 MG: 2.5 SOLUTION RESPIRATORY (INHALATION) at 07:02

## 2017-06-18 LAB
ALBUMIN SERPL-MCNC: 3.4 G/DL (ref 3.4–4.8)
ALBUMIN/GLOB SERPL: 1.1 G/DL (ref 1.5–2.5)
ALP SERPL-CCNC: 53 U/L (ref 35–104)
ALT SERPL W P-5'-P-CCNC: 15 U/L (ref 10–36)
ANION GAP SERPL CALCULATED.3IONS-SCNC: 10.1 MMOL/L (ref 3.6–11.2)
AST SERPL-CCNC: 30 U/L (ref 10–30)
BASOPHILS # BLD AUTO: 0.03 10*3/MM3 (ref 0–0.3)
BASOPHILS NFR BLD AUTO: 0.5 % (ref 0–2)
BILIRUB SERPL-MCNC: 0.5 MG/DL (ref 0.2–1.8)
BUN BLD-MCNC: 28 MG/DL (ref 7–21)
BUN/CREAT SERPL: 10.9 (ref 7–25)
CALCIUM SPEC-SCNC: 9.1 MG/DL (ref 7.7–10)
CHLORIDE SERPL-SCNC: 96 MMOL/L (ref 99–112)
CO2 SERPL-SCNC: 34.9 MMOL/L (ref 24.3–31.9)
CREAT BLD-MCNC: 2.56 MG/DL (ref 0.43–1.29)
DEPRECATED RDW RBC AUTO: 55.1 FL (ref 37–54)
EOSINOPHIL # BLD AUTO: 0.39 10*3/MM3 (ref 0–0.7)
EOSINOPHIL NFR BLD AUTO: 6 % (ref 0–7)
ERYTHROCYTE [DISTWIDTH] IN BLOOD BY AUTOMATED COUNT: 21.4 % (ref 11.5–14.5)
GFR SERPL CREATININE-BSD FRML MDRD: 18 ML/MIN/1.73
GLOBULIN UR ELPH-MCNC: 3.1 GM/DL
GLUCOSE BLD-MCNC: 99 MG/DL (ref 70–110)
HCT VFR BLD AUTO: 31.7 % (ref 37–47)
HGB BLD-MCNC: 9.4 G/DL (ref 12–16)
IMM GRANULOCYTES # BLD: 0.01 10*3/MM3 (ref 0–0.03)
IMM GRANULOCYTES NFR BLD: 0.2 % (ref 0–0.5)
INR PPP: 2.96 (ref 0.8–1.1)
LYMPHOCYTES # BLD AUTO: 0.72 10*3/MM3 (ref 1–3)
LYMPHOCYTES NFR BLD AUTO: 11.2 % (ref 16–46)
MCH RBC QN AUTO: 24 PG (ref 27–33)
MCHC RBC AUTO-ENTMCNC: 29.7 G/DL (ref 33–37)
MCV RBC AUTO: 81.1 FL (ref 80–94)
MONOCYTES # BLD AUTO: 0.56 10*3/MM3 (ref 0.1–0.9)
MONOCYTES NFR BLD AUTO: 8.7 % (ref 0–12)
NEUTROPHILS # BLD AUTO: 4.74 10*3/MM3 (ref 1.4–6.5)
NEUTROPHILS NFR BLD AUTO: 73.4 % (ref 40–75)
OSMOLALITY SERPL CALC.SUM OF ELEC: 286.8 MOSM/KG (ref 273–305)
PLATELET # BLD AUTO: 269 10*3/MM3 (ref 130–400)
PMV BLD AUTO: 9.8 FL (ref 6–10)
POTASSIUM BLD-SCNC: 4.1 MMOL/L (ref 3.5–5.3)
PROT SERPL-MCNC: 6.5 G/DL (ref 6–8)
PROTHROMBIN TIME: 35.2 SECONDS (ref 9.8–11.9)
RBC # BLD AUTO: 3.91 10*6/MM3 (ref 4.2–5.4)
SODIUM BLD-SCNC: 141 MMOL/L (ref 135–153)
WBC NRBC COR # BLD: 6.45 10*3/MM3 (ref 4.5–12.5)

## 2017-06-18 PROCEDURE — 80053 COMPREHEN METABOLIC PANEL: CPT | Performed by: INTERNAL MEDICINE

## 2017-06-18 PROCEDURE — 94799 UNLISTED PULMONARY SVC/PX: CPT

## 2017-06-18 PROCEDURE — 85025 COMPLETE CBC W/AUTO DIFF WBC: CPT | Performed by: INTERNAL MEDICINE

## 2017-06-18 PROCEDURE — 85610 PROTHROMBIN TIME: CPT | Performed by: FAMILY MEDICINE

## 2017-06-18 RX ORDER — SODIUM CHLORIDE 9 MG/ML
50 INJECTION, SOLUTION INTRAVENOUS CONTINUOUS
Status: DISCONTINUED | OUTPATIENT
Start: 2017-06-18 | End: 2017-06-20

## 2017-06-18 RX ADMIN — ALPRAZOLAM 1 MG: 1 TABLET ORAL at 08:11

## 2017-06-18 RX ADMIN — METOPROLOL TARTRATE 100 MG: 100 TABLET, FILM COATED ORAL at 21:00

## 2017-06-18 RX ADMIN — PANTOPRAZOLE SODIUM 40 MG: 40 TABLET, DELAYED RELEASE ORAL at 08:11

## 2017-06-18 RX ADMIN — CHOLECALCIFEROL CAP 125 MCG (5000 UNIT) 5000 UNITS: 125 CAP at 08:11

## 2017-06-18 RX ADMIN — HYDROCODONE BITARTRATE AND ACETAMINOPHEN 1 TABLET: 7.5; 325 TABLET ORAL at 08:11

## 2017-06-18 RX ADMIN — ALBUTEROL SULFATE 2.5 MG: 2.5 SOLUTION RESPIRATORY (INHALATION) at 19:39

## 2017-06-18 RX ADMIN — ALBUTEROL SULFATE 2.5 MG: 2.5 SOLUTION RESPIRATORY (INHALATION) at 06:56

## 2017-06-18 RX ADMIN — CITALOPRAM HYDROBROMIDE 20 MG: 20 TABLET ORAL at 08:12

## 2017-06-18 RX ADMIN — TAMSULOSIN HYDROCHLORIDE 0.4 MG: 0.4 CAPSULE ORAL at 08:11

## 2017-06-18 RX ADMIN — ISOSORBIDE MONONITRATE 60 MG: 60 TABLET, EXTENDED RELEASE ORAL at 08:12

## 2017-06-18 RX ADMIN — METOPROLOL TARTRATE 100 MG: 100 TABLET, FILM COATED ORAL at 08:11

## 2017-06-18 RX ADMIN — AMLODIPINE BESYLATE 10 MG: 10 TABLET ORAL at 08:12

## 2017-06-18 RX ADMIN — SODIUM CHLORIDE 50 ML/HR: 9 INJECTION, SOLUTION INTRAVENOUS at 11:24

## 2017-06-19 LAB
ALBUMIN SERPL-MCNC: 3.3 G/DL (ref 3.4–4.8)
ALBUMIN/GLOB SERPL: 1.1 G/DL (ref 1.5–2.5)
ALP SERPL-CCNC: 55 U/L (ref 35–104)
ALT SERPL W P-5'-P-CCNC: 17 U/L (ref 10–36)
ANION GAP SERPL CALCULATED.3IONS-SCNC: 11.7 MMOL/L (ref 3.6–11.2)
AST SERPL-CCNC: 31 U/L (ref 10–30)
BASOPHILS # BLD AUTO: 0.05 10*3/MM3 (ref 0–0.3)
BASOPHILS NFR BLD AUTO: 0.7 % (ref 0–2)
BILIRUB SERPL-MCNC: 0.7 MG/DL (ref 0.2–1.8)
BUN BLD-MCNC: 25 MG/DL (ref 7–21)
BUN/CREAT SERPL: 11.3 (ref 7–25)
CALCIUM SPEC-SCNC: 9.1 MG/DL (ref 7.7–10)
CHLORIDE SERPL-SCNC: 98 MMOL/L (ref 99–112)
CO2 SERPL-SCNC: 29.3 MMOL/L (ref 24.3–31.9)
CREAT BLD-MCNC: 2.22 MG/DL (ref 0.43–1.29)
DEPRECATED RDW RBC AUTO: 56.9 FL (ref 37–54)
EOSINOPHIL # BLD AUTO: 0.27 10*3/MM3 (ref 0–0.7)
EOSINOPHIL NFR BLD AUTO: 3.8 % (ref 0–7)
ERYTHROCYTE [DISTWIDTH] IN BLOOD BY AUTOMATED COUNT: 21.7 % (ref 11.5–14.5)
GFR SERPL CREATININE-BSD FRML MDRD: 21 ML/MIN/1.73
GLOBULIN UR ELPH-MCNC: 3.1 GM/DL
GLUCOSE BLD-MCNC: 97 MG/DL (ref 70–110)
HCT VFR BLD AUTO: 33.4 % (ref 37–47)
HGB BLD-MCNC: 10 G/DL (ref 12–16)
IMM GRANULOCYTES # BLD: 0.04 10*3/MM3 (ref 0–0.03)
IMM GRANULOCYTES NFR BLD: 0.6 % (ref 0–0.5)
INR PPP: 2.1 (ref 0.8–1.1)
LYMPHOCYTES # BLD AUTO: 0.62 10*3/MM3 (ref 1–3)
LYMPHOCYTES NFR BLD AUTO: 8.8 % (ref 16–46)
MCH RBC QN AUTO: 24.2 PG (ref 27–33)
MCHC RBC AUTO-ENTMCNC: 29.9 G/DL (ref 33–37)
MCV RBC AUTO: 80.7 FL (ref 80–94)
MONOCYTES # BLD AUTO: 0.52 10*3/MM3 (ref 0.1–0.9)
MONOCYTES NFR BLD AUTO: 7.4 % (ref 0–12)
NEUTROPHILS # BLD AUTO: 5.56 10*3/MM3 (ref 1.4–6.5)
NEUTROPHILS NFR BLD AUTO: 78.7 % (ref 40–75)
OSMOLALITY SERPL CALC.SUM OF ELEC: 281.9 MOSM/KG (ref 273–305)
PLATELET # BLD AUTO: 238 10*3/MM3 (ref 130–400)
PMV BLD AUTO: 10.3 FL (ref 6–10)
POTASSIUM BLD-SCNC: 4 MMOL/L (ref 3.5–5.3)
PROT SERPL-MCNC: 6.4 G/DL (ref 6–8)
PROTHROMBIN TIME: 24.4 SECONDS (ref 9.8–11.9)
RBC # BLD AUTO: 4.14 10*6/MM3 (ref 4.2–5.4)
SODIUM BLD-SCNC: 139 MMOL/L (ref 135–153)
WBC NRBC COR # BLD: 7.06 10*3/MM3 (ref 4.5–12.5)

## 2017-06-19 PROCEDURE — 85025 COMPLETE CBC W/AUTO DIFF WBC: CPT | Performed by: FAMILY MEDICINE

## 2017-06-19 PROCEDURE — 25010000002 HEPARIN (PORCINE) PER 1000 UNITS: Performed by: FAMILY MEDICINE

## 2017-06-19 PROCEDURE — 85610 PROTHROMBIN TIME: CPT | Performed by: FAMILY MEDICINE

## 2017-06-19 PROCEDURE — 80053 COMPREHEN METABOLIC PANEL: CPT | Performed by: INTERNAL MEDICINE

## 2017-06-19 PROCEDURE — 97110 THERAPEUTIC EXERCISES: CPT

## 2017-06-19 PROCEDURE — 97530 THERAPEUTIC ACTIVITIES: CPT

## 2017-06-19 PROCEDURE — 94799 UNLISTED PULMONARY SVC/PX: CPT

## 2017-06-19 PROCEDURE — 97116 GAIT TRAINING THERAPY: CPT

## 2017-06-19 RX ORDER — ALPRAZOLAM 0.5 MG/1
0.5 TABLET ORAL 2 TIMES DAILY PRN
Status: DISCONTINUED | OUTPATIENT
Start: 2017-06-19 | End: 2017-06-29 | Stop reason: HOSPADM

## 2017-06-19 RX ORDER — HEPARIN SODIUM 5000 [USP'U]/ML
5000 INJECTION, SOLUTION INTRAVENOUS; SUBCUTANEOUS EVERY 12 HOURS SCHEDULED
Status: DISCONTINUED | OUTPATIENT
Start: 2017-06-19 | End: 2017-06-23

## 2017-06-19 RX ORDER — AMLODIPINE BESYLATE 5 MG/1
2.5 TABLET ORAL DAILY
Status: DISCONTINUED | OUTPATIENT
Start: 2017-06-19 | End: 2017-06-29 | Stop reason: HOSPADM

## 2017-06-19 RX ADMIN — HEPARIN SODIUM 5000 UNITS: 5000 INJECTION, SOLUTION INTRAVENOUS; SUBCUTANEOUS at 09:22

## 2017-06-19 RX ADMIN — METOPROLOL TARTRATE 100 MG: 100 TABLET, FILM COATED ORAL at 21:05

## 2017-06-19 RX ADMIN — ISOSORBIDE MONONITRATE 60 MG: 60 TABLET, EXTENDED RELEASE ORAL at 09:12

## 2017-06-19 RX ADMIN — CHOLECALCIFEROL CAP 125 MCG (5000 UNIT) 5000 UNITS: 125 CAP at 09:13

## 2017-06-19 RX ADMIN — AMLODIPINE BESYLATE 2.5 MG: 10 TABLET ORAL at 09:13

## 2017-06-19 RX ADMIN — TAMSULOSIN HYDROCHLORIDE 0.4 MG: 0.4 CAPSULE ORAL at 09:13

## 2017-06-19 RX ADMIN — METOPROLOL TARTRATE 100 MG: 100 TABLET, FILM COATED ORAL at 09:12

## 2017-06-19 RX ADMIN — ALBUTEROL SULFATE 2.5 MG: 2.5 SOLUTION RESPIRATORY (INHALATION) at 07:13

## 2017-06-19 RX ADMIN — HEPARIN SODIUM 5000 UNITS: 5000 INJECTION, SOLUTION INTRAVENOUS; SUBCUTANEOUS at 21:04

## 2017-06-19 RX ADMIN — CITALOPRAM HYDROBROMIDE 20 MG: 20 TABLET ORAL at 09:13

## 2017-06-19 RX ADMIN — PANTOPRAZOLE SODIUM 40 MG: 40 TABLET, DELAYED RELEASE ORAL at 09:12

## 2017-06-20 LAB
ANION GAP SERPL CALCULATED.3IONS-SCNC: 7.5 MMOL/L (ref 3.6–11.2)
BUN BLD-MCNC: 22 MG/DL (ref 7–21)
BUN/CREAT SERPL: 11.3 (ref 7–25)
CALCIUM SPEC-SCNC: 9 MG/DL (ref 7.7–10)
CHLORIDE SERPL-SCNC: 101 MMOL/L (ref 99–112)
CO2 SERPL-SCNC: 31.5 MMOL/L (ref 24.3–31.9)
CREAT BLD-MCNC: 1.95 MG/DL (ref 0.43–1.29)
GFR SERPL CREATININE-BSD FRML MDRD: 24 ML/MIN/1.73
GLUCOSE BLD-MCNC: 88 MG/DL (ref 70–110)
INR PPP: 1.76 (ref 0.8–1.1)
OSMOLALITY SERPL CALC.SUM OF ELEC: 282.1 MOSM/KG (ref 273–305)
POTASSIUM BLD-SCNC: 3.8 MMOL/L (ref 3.5–5.3)
PROTHROMBIN TIME: 20.3 SECONDS (ref 9.8–11.9)
SODIUM BLD-SCNC: 140 MMOL/L (ref 135–153)

## 2017-06-20 PROCEDURE — 80048 BASIC METABOLIC PNL TOTAL CA: CPT | Performed by: FAMILY MEDICINE

## 2017-06-20 PROCEDURE — 94799 UNLISTED PULMONARY SVC/PX: CPT

## 2017-06-20 PROCEDURE — 85610 PROTHROMBIN TIME: CPT | Performed by: FAMILY MEDICINE

## 2017-06-20 PROCEDURE — 25010000002 HEPARIN (PORCINE) PER 1000 UNITS: Performed by: FAMILY MEDICINE

## 2017-06-20 PROCEDURE — 97530 THERAPEUTIC ACTIVITIES: CPT

## 2017-06-20 PROCEDURE — 25010000002 ONDANSETRON PER 1 MG: Performed by: FAMILY MEDICINE

## 2017-06-20 RX ORDER — WARFARIN SODIUM 4 MG/1
4 TABLET ORAL
Status: DISCONTINUED | OUTPATIENT
Start: 2017-06-20 | End: 2017-06-29 | Stop reason: HOSPADM

## 2017-06-20 RX ADMIN — SODIUM CHLORIDE 50 ML/HR: 9 INJECTION, SOLUTION INTRAVENOUS at 03:18

## 2017-06-20 RX ADMIN — ISOSORBIDE MONONITRATE 60 MG: 60 TABLET, EXTENDED RELEASE ORAL at 09:01

## 2017-06-20 RX ADMIN — TAMSULOSIN HYDROCHLORIDE 0.4 MG: 0.4 CAPSULE ORAL at 09:00

## 2017-06-20 RX ADMIN — HEPARIN SODIUM 5000 UNITS: 5000 INJECTION, SOLUTION INTRAVENOUS; SUBCUTANEOUS at 20:49

## 2017-06-20 RX ADMIN — ONDANSETRON 4 MG: 2 INJECTION, SOLUTION INTRAMUSCULAR; INTRAVENOUS at 22:07

## 2017-06-20 RX ADMIN — METOPROLOL TARTRATE 100 MG: 100 TABLET, FILM COATED ORAL at 20:49

## 2017-06-20 RX ADMIN — CITALOPRAM HYDROBROMIDE 20 MG: 20 TABLET ORAL at 09:01

## 2017-06-20 RX ADMIN — ALBUTEROL SULFATE 2.5 MG: 2.5 SOLUTION RESPIRATORY (INHALATION) at 07:24

## 2017-06-20 RX ADMIN — PANTOPRAZOLE SODIUM 40 MG: 40 TABLET, DELAYED RELEASE ORAL at 09:00

## 2017-06-20 RX ADMIN — ONDANSETRON 4 MG: 2 INJECTION, SOLUTION INTRAMUSCULAR; INTRAVENOUS at 10:30

## 2017-06-20 RX ADMIN — HEPARIN SODIUM 5000 UNITS: 5000 INJECTION, SOLUTION INTRAVENOUS; SUBCUTANEOUS at 09:01

## 2017-06-20 RX ADMIN — CHOLECALCIFEROL CAP 125 MCG (5000 UNIT) 5000 UNITS: 125 CAP at 09:01

## 2017-06-20 RX ADMIN — WARFARIN SODIUM 4 MG: 4 TABLET ORAL at 20:49

## 2017-06-20 RX ADMIN — ALBUTEROL SULFATE 2.5 MG: 2.5 SOLUTION RESPIRATORY (INHALATION) at 19:03

## 2017-06-20 RX ADMIN — METOPROLOL TARTRATE 100 MG: 100 TABLET, FILM COATED ORAL at 09:00

## 2017-06-20 RX ADMIN — AMLODIPINE BESYLATE 2.5 MG: 10 TABLET ORAL at 09:01

## 2017-06-21 LAB
ANION GAP SERPL CALCULATED.3IONS-SCNC: 5.2 MMOL/L (ref 3.6–11.2)
BUN BLD-MCNC: 23 MG/DL (ref 7–21)
BUN/CREAT SERPL: 12.4 (ref 7–25)
CALCIUM SPEC-SCNC: 9.3 MG/DL (ref 7.7–10)
CHLORIDE SERPL-SCNC: 99 MMOL/L (ref 99–112)
CO2 SERPL-SCNC: 33.8 MMOL/L (ref 24.3–31.9)
CREAT BLD-MCNC: 1.85 MG/DL (ref 0.43–1.29)
GFR SERPL CREATININE-BSD FRML MDRD: 26 ML/MIN/1.73
GLUCOSE BLD-MCNC: 93 MG/DL (ref 70–110)
INR PPP: 1.64 (ref 0.8–1.1)
OSMOLALITY SERPL CALC.SUM OF ELEC: 279.1 MOSM/KG (ref 273–305)
POTASSIUM BLD-SCNC: 3.8 MMOL/L (ref 3.5–5.3)
PROTHROMBIN TIME: 18.8 SECONDS (ref 9.8–11.9)
SODIUM BLD-SCNC: 138 MMOL/L (ref 135–153)

## 2017-06-21 PROCEDURE — 85610 PROTHROMBIN TIME: CPT | Performed by: FAMILY MEDICINE

## 2017-06-21 PROCEDURE — 97530 THERAPEUTIC ACTIVITIES: CPT

## 2017-06-21 PROCEDURE — 80048 BASIC METABOLIC PNL TOTAL CA: CPT | Performed by: FAMILY MEDICINE

## 2017-06-21 PROCEDURE — 97535 SELF CARE MNGMENT TRAINING: CPT

## 2017-06-21 PROCEDURE — 97110 THERAPEUTIC EXERCISES: CPT | Performed by: PHYSICAL THERAPIST

## 2017-06-21 PROCEDURE — 94799 UNLISTED PULMONARY SVC/PX: CPT

## 2017-06-21 PROCEDURE — 97530 THERAPEUTIC ACTIVITIES: CPT | Performed by: PHYSICAL THERAPIST

## 2017-06-21 PROCEDURE — 25010000002 HEPARIN (PORCINE) PER 1000 UNITS: Performed by: FAMILY MEDICINE

## 2017-06-21 RX ORDER — DOCUSATE SODIUM 100 MG/1
100 CAPSULE, LIQUID FILLED ORAL DAILY
Status: DISCONTINUED | OUTPATIENT
Start: 2017-06-21 | End: 2017-06-29 | Stop reason: HOSPADM

## 2017-06-21 RX ADMIN — CHOLECALCIFEROL CAP 125 MCG (5000 UNIT) 5000 UNITS: 125 CAP at 09:04

## 2017-06-21 RX ADMIN — HEPARIN SODIUM 5000 UNITS: 5000 INJECTION, SOLUTION INTRAVENOUS; SUBCUTANEOUS at 20:21

## 2017-06-21 RX ADMIN — CITALOPRAM HYDROBROMIDE 20 MG: 20 TABLET ORAL at 09:05

## 2017-06-21 RX ADMIN — DOCUSATE SODIUM 100 MG: 100 CAPSULE, LIQUID FILLED ORAL at 13:06

## 2017-06-21 RX ADMIN — METOPROLOL TARTRATE 100 MG: 100 TABLET, FILM COATED ORAL at 20:21

## 2017-06-21 RX ADMIN — PANTOPRAZOLE SODIUM 40 MG: 40 TABLET, DELAYED RELEASE ORAL at 09:04

## 2017-06-21 RX ADMIN — TAMSULOSIN HYDROCHLORIDE 0.4 MG: 0.4 CAPSULE ORAL at 09:04

## 2017-06-21 RX ADMIN — ALBUTEROL SULFATE 2.5 MG: 2.5 SOLUTION RESPIRATORY (INHALATION) at 07:19

## 2017-06-21 RX ADMIN — ISOSORBIDE MONONITRATE 60 MG: 60 TABLET, EXTENDED RELEASE ORAL at 09:04

## 2017-06-21 RX ADMIN — AMLODIPINE BESYLATE 2.5 MG: 10 TABLET ORAL at 09:04

## 2017-06-21 RX ADMIN — WARFARIN SODIUM 4 MG: 4 TABLET ORAL at 17:33

## 2017-06-21 RX ADMIN — HEPARIN SODIUM 5000 UNITS: 5000 INJECTION, SOLUTION INTRAVENOUS; SUBCUTANEOUS at 09:05

## 2017-06-21 RX ADMIN — METOPROLOL TARTRATE 100 MG: 100 TABLET, FILM COATED ORAL at 09:04

## 2017-06-22 LAB
ANION GAP SERPL CALCULATED.3IONS-SCNC: 8.7 MMOL/L (ref 3.6–11.2)
BUN BLD-MCNC: 19 MG/DL (ref 7–21)
BUN/CREAT SERPL: 11 (ref 7–25)
CALCIUM SPEC-SCNC: 9.6 MG/DL (ref 7.7–10)
CHLORIDE SERPL-SCNC: 102 MMOL/L (ref 99–112)
CO2 SERPL-SCNC: 33.3 MMOL/L (ref 24.3–31.9)
CREAT BLD-MCNC: 1.72 MG/DL (ref 0.43–1.29)
GFR SERPL CREATININE-BSD FRML MDRD: 28 ML/MIN/1.73
GLUCOSE BLD-MCNC: 84 MG/DL (ref 70–110)
INR PPP: 1.91 (ref 0.8–1.1)
OSMOLALITY SERPL CALC.SUM OF ELEC: 288.3 MOSM/KG (ref 273–305)
POTASSIUM BLD-SCNC: 4.1 MMOL/L (ref 3.5–5.3)
PROTHROMBIN TIME: 22.1 SECONDS (ref 9.8–11.9)
SODIUM BLD-SCNC: 144 MMOL/L (ref 135–153)

## 2017-06-22 PROCEDURE — 94799 UNLISTED PULMONARY SVC/PX: CPT

## 2017-06-22 PROCEDURE — 97530 THERAPEUTIC ACTIVITIES: CPT

## 2017-06-22 PROCEDURE — 85610 PROTHROMBIN TIME: CPT | Performed by: FAMILY MEDICINE

## 2017-06-22 PROCEDURE — 80048 BASIC METABOLIC PNL TOTAL CA: CPT | Performed by: FAMILY MEDICINE

## 2017-06-22 PROCEDURE — 25010000002 HEPARIN (PORCINE) PER 1000 UNITS: Performed by: FAMILY MEDICINE

## 2017-06-22 RX ORDER — ALBUTEROL SULFATE 2.5 MG/3ML
2.5 SOLUTION RESPIRATORY (INHALATION) 2 TIMES DAILY PRN
Status: DISCONTINUED | OUTPATIENT
Start: 2017-06-22 | End: 2017-06-29 | Stop reason: HOSPADM

## 2017-06-22 RX ADMIN — HEPARIN SODIUM 5000 UNITS: 5000 INJECTION, SOLUTION INTRAVENOUS; SUBCUTANEOUS at 21:29

## 2017-06-22 RX ADMIN — WARFARIN SODIUM 4 MG: 4 TABLET ORAL at 17:32

## 2017-06-22 RX ADMIN — AMLODIPINE BESYLATE 2.5 MG: 10 TABLET ORAL at 08:17

## 2017-06-22 RX ADMIN — METOPROLOL TARTRATE 100 MG: 100 TABLET, FILM COATED ORAL at 21:30

## 2017-06-22 RX ADMIN — HYDROCODONE BITARTRATE AND ACETAMINOPHEN 1 TABLET: 7.5; 325 TABLET ORAL at 21:32

## 2017-06-22 RX ADMIN — CITALOPRAM HYDROBROMIDE 20 MG: 20 TABLET ORAL at 08:18

## 2017-06-22 RX ADMIN — METOPROLOL TARTRATE 100 MG: 100 TABLET, FILM COATED ORAL at 08:18

## 2017-06-22 RX ADMIN — DOCUSATE SODIUM 100 MG: 100 CAPSULE, LIQUID FILLED ORAL at 08:18

## 2017-06-22 RX ADMIN — PANTOPRAZOLE SODIUM 40 MG: 40 TABLET, DELAYED RELEASE ORAL at 08:17

## 2017-06-22 RX ADMIN — HEPARIN SODIUM 5000 UNITS: 5000 INJECTION, SOLUTION INTRAVENOUS; SUBCUTANEOUS at 08:18

## 2017-06-22 RX ADMIN — TAMSULOSIN HYDROCHLORIDE 0.4 MG: 0.4 CAPSULE ORAL at 08:17

## 2017-06-22 RX ADMIN — ISOSORBIDE MONONITRATE 60 MG: 60 TABLET, EXTENDED RELEASE ORAL at 08:18

## 2017-06-22 RX ADMIN — CHOLECALCIFEROL CAP 125 MCG (5000 UNIT) 5000 UNITS: 125 CAP at 08:18

## 2017-06-23 LAB
ANION GAP SERPL CALCULATED.3IONS-SCNC: 2.8 MMOL/L (ref 3.6–11.2)
BUN BLD-MCNC: 18 MG/DL (ref 7–21)
BUN/CREAT SERPL: 11.1 (ref 7–25)
CALCIUM SPEC-SCNC: 9.1 MG/DL (ref 7.7–10)
CHLORIDE SERPL-SCNC: 104 MMOL/L (ref 99–112)
CO2 SERPL-SCNC: 31.2 MMOL/L (ref 24.3–31.9)
CREAT BLD-MCNC: 1.62 MG/DL (ref 0.43–1.29)
GFR SERPL CREATININE-BSD FRML MDRD: 30 ML/MIN/1.73
GLUCOSE BLD-MCNC: 89 MG/DL (ref 70–110)
INR PPP: 2.01 (ref 0.8–1.1)
OSMOLALITY SERPL CALC.SUM OF ELEC: 277.1 MOSM/KG (ref 273–305)
POTASSIUM BLD-SCNC: 3.8 MMOL/L (ref 3.5–5.3)
PROTHROMBIN TIME: 23.3 SECONDS (ref 9.8–11.9)
SODIUM BLD-SCNC: 138 MMOL/L (ref 135–153)

## 2017-06-23 PROCEDURE — 80048 BASIC METABOLIC PNL TOTAL CA: CPT | Performed by: FAMILY MEDICINE

## 2017-06-23 PROCEDURE — 94799 UNLISTED PULMONARY SVC/PX: CPT

## 2017-06-23 PROCEDURE — 97530 THERAPEUTIC ACTIVITIES: CPT

## 2017-06-23 PROCEDURE — 97116 GAIT TRAINING THERAPY: CPT

## 2017-06-23 PROCEDURE — 25010000002 HEPARIN (PORCINE) PER 1000 UNITS: Performed by: FAMILY MEDICINE

## 2017-06-23 PROCEDURE — 97110 THERAPEUTIC EXERCISES: CPT

## 2017-06-23 PROCEDURE — 85610 PROTHROMBIN TIME: CPT | Performed by: FAMILY MEDICINE

## 2017-06-23 RX ADMIN — TAMSULOSIN HYDROCHLORIDE 0.4 MG: 0.4 CAPSULE ORAL at 08:30

## 2017-06-23 RX ADMIN — WARFARIN SODIUM 4 MG: 4 TABLET ORAL at 17:16

## 2017-06-23 RX ADMIN — HEPARIN SODIUM 5000 UNITS: 5000 INJECTION, SOLUTION INTRAVENOUS; SUBCUTANEOUS at 08:30

## 2017-06-23 RX ADMIN — ISOSORBIDE MONONITRATE 60 MG: 60 TABLET, EXTENDED RELEASE ORAL at 08:30

## 2017-06-23 RX ADMIN — PANTOPRAZOLE SODIUM 40 MG: 40 TABLET, DELAYED RELEASE ORAL at 08:30

## 2017-06-23 RX ADMIN — CITALOPRAM HYDROBROMIDE 20 MG: 20 TABLET ORAL at 08:30

## 2017-06-23 RX ADMIN — AMLODIPINE BESYLATE 2.5 MG: 10 TABLET ORAL at 08:30

## 2017-06-23 RX ADMIN — METOPROLOL TARTRATE 100 MG: 100 TABLET, FILM COATED ORAL at 08:30

## 2017-06-23 RX ADMIN — DOCUSATE SODIUM 100 MG: 100 CAPSULE, LIQUID FILLED ORAL at 08:30

## 2017-06-23 RX ADMIN — CHOLECALCIFEROL CAP 125 MCG (5000 UNIT) 5000 UNITS: 125 CAP at 08:30

## 2017-06-23 RX ADMIN — HYDROCODONE BITARTRATE AND ACETAMINOPHEN 1 TABLET: 7.5; 325 TABLET ORAL at 20:40

## 2017-06-23 RX ADMIN — METOPROLOL TARTRATE 100 MG: 100 TABLET, FILM COATED ORAL at 20:40

## 2017-06-24 LAB
ANION GAP SERPL CALCULATED.3IONS-SCNC: 4.6 MMOL/L (ref 3.6–11.2)
BUN BLD-MCNC: 17 MG/DL (ref 7–21)
BUN/CREAT SERPL: 10.3 (ref 7–25)
CALCIUM SPEC-SCNC: 8.9 MG/DL (ref 7.7–10)
CHLORIDE SERPL-SCNC: 102 MMOL/L (ref 99–112)
CO2 SERPL-SCNC: 28.4 MMOL/L (ref 24.3–31.9)
CREAT BLD-MCNC: 1.65 MG/DL (ref 0.43–1.29)
GFR SERPL CREATININE-BSD FRML MDRD: 30 ML/MIN/1.73
GLUCOSE BLD-MCNC: 91 MG/DL (ref 70–110)
INR PPP: 1.98 (ref 0.8–1.1)
OSMOLALITY SERPL CALC.SUM OF ELEC: 271.2 MOSM/KG (ref 273–305)
POTASSIUM BLD-SCNC: 4 MMOL/L (ref 3.5–5.3)
PROTHROMBIN TIME: 22.9 SECONDS (ref 9.8–11.9)
SODIUM BLD-SCNC: 135 MMOL/L (ref 135–153)

## 2017-06-24 PROCEDURE — 94799 UNLISTED PULMONARY SVC/PX: CPT

## 2017-06-24 PROCEDURE — 97530 THERAPEUTIC ACTIVITIES: CPT

## 2017-06-24 PROCEDURE — 80048 BASIC METABOLIC PNL TOTAL CA: CPT | Performed by: FAMILY MEDICINE

## 2017-06-24 PROCEDURE — 85610 PROTHROMBIN TIME: CPT | Performed by: FAMILY MEDICINE

## 2017-06-24 RX ADMIN — METOPROLOL TARTRATE 100 MG: 100 TABLET, FILM COATED ORAL at 08:01

## 2017-06-24 RX ADMIN — METOPROLOL TARTRATE 100 MG: 100 TABLET, FILM COATED ORAL at 21:14

## 2017-06-24 RX ADMIN — DOCUSATE SODIUM 100 MG: 100 CAPSULE, LIQUID FILLED ORAL at 08:01

## 2017-06-24 RX ADMIN — AMLODIPINE BESYLATE 2.5 MG: 10 TABLET ORAL at 08:00

## 2017-06-24 RX ADMIN — ISOSORBIDE MONONITRATE 60 MG: 60 TABLET, EXTENDED RELEASE ORAL at 08:01

## 2017-06-24 RX ADMIN — CITALOPRAM HYDROBROMIDE 20 MG: 20 TABLET ORAL at 08:01

## 2017-06-24 RX ADMIN — HYDROCODONE BITARTRATE AND ACETAMINOPHEN 1 TABLET: 7.5; 325 TABLET ORAL at 21:20

## 2017-06-24 RX ADMIN — PANTOPRAZOLE SODIUM 40 MG: 40 TABLET, DELAYED RELEASE ORAL at 08:01

## 2017-06-24 RX ADMIN — CHOLECALCIFEROL CAP 125 MCG (5000 UNIT) 5000 UNITS: 125 CAP at 08:01

## 2017-06-24 RX ADMIN — TAMSULOSIN HYDROCHLORIDE 0.4 MG: 0.4 CAPSULE ORAL at 08:01

## 2017-06-24 RX ADMIN — WARFARIN SODIUM 4 MG: 4 TABLET ORAL at 17:35

## 2017-06-25 LAB
ANION GAP SERPL CALCULATED.3IONS-SCNC: 1.5 MMOL/L (ref 3.6–11.2)
BUN BLD-MCNC: 16 MG/DL (ref 7–21)
BUN/CREAT SERPL: 9.8 (ref 7–25)
CALCIUM SPEC-SCNC: 9.1 MG/DL (ref 7.7–10)
CHLORIDE SERPL-SCNC: 103 MMOL/L (ref 99–112)
CO2 SERPL-SCNC: 31.5 MMOL/L (ref 24.3–31.9)
CREAT BLD-MCNC: 1.64 MG/DL (ref 0.43–1.29)
GFR SERPL CREATININE-BSD FRML MDRD: 30 ML/MIN/1.73
GLUCOSE BLD-MCNC: 91 MG/DL (ref 70–110)
INR PPP: 1.85 (ref 0.8–1.1)
OSMOLALITY SERPL CALC.SUM OF ELEC: 272.7 MOSM/KG (ref 273–305)
POTASSIUM BLD-SCNC: 4 MMOL/L (ref 3.5–5.3)
PROTHROMBIN TIME: 21.4 SECONDS (ref 9.8–11.9)
SODIUM BLD-SCNC: 136 MMOL/L (ref 135–153)

## 2017-06-25 PROCEDURE — 85610 PROTHROMBIN TIME: CPT | Performed by: FAMILY MEDICINE

## 2017-06-25 PROCEDURE — 94799 UNLISTED PULMONARY SVC/PX: CPT

## 2017-06-25 PROCEDURE — 80048 BASIC METABOLIC PNL TOTAL CA: CPT | Performed by: FAMILY MEDICINE

## 2017-06-25 RX ADMIN — ISOSORBIDE MONONITRATE 60 MG: 60 TABLET, EXTENDED RELEASE ORAL at 08:30

## 2017-06-25 RX ADMIN — AMLODIPINE BESYLATE 2.5 MG: 10 TABLET ORAL at 08:32

## 2017-06-25 RX ADMIN — DOCUSATE SODIUM 100 MG: 100 CAPSULE, LIQUID FILLED ORAL at 08:30

## 2017-06-25 RX ADMIN — PANTOPRAZOLE SODIUM 40 MG: 40 TABLET, DELAYED RELEASE ORAL at 08:30

## 2017-06-25 RX ADMIN — METOPROLOL TARTRATE 100 MG: 100 TABLET, FILM COATED ORAL at 20:54

## 2017-06-25 RX ADMIN — TAMSULOSIN HYDROCHLORIDE 0.4 MG: 0.4 CAPSULE ORAL at 08:30

## 2017-06-25 RX ADMIN — HYDROCODONE BITARTRATE AND ACETAMINOPHEN 1 TABLET: 7.5; 325 TABLET ORAL at 20:54

## 2017-06-25 RX ADMIN — WARFARIN SODIUM 4 MG: 4 TABLET ORAL at 17:18

## 2017-06-25 RX ADMIN — METOPROLOL TARTRATE 100 MG: 100 TABLET, FILM COATED ORAL at 08:30

## 2017-06-25 RX ADMIN — CITALOPRAM HYDROBROMIDE 20 MG: 20 TABLET ORAL at 08:30

## 2017-06-25 RX ADMIN — CHOLECALCIFEROL CAP 125 MCG (5000 UNIT) 5000 UNITS: 125 CAP at 08:30

## 2017-06-26 LAB
ANION GAP SERPL CALCULATED.3IONS-SCNC: 4.2 MMOL/L (ref 3.6–11.2)
BUN BLD-MCNC: 16 MG/DL (ref 7–21)
BUN/CREAT SERPL: 9.9 (ref 7–25)
CALCIUM SPEC-SCNC: 8.8 MG/DL (ref 7.7–10)
CHLORIDE SERPL-SCNC: 102 MMOL/L (ref 99–112)
CO2 SERPL-SCNC: 29.8 MMOL/L (ref 24.3–31.9)
CREAT BLD-MCNC: 1.62 MG/DL (ref 0.43–1.29)
GFR SERPL CREATININE-BSD FRML MDRD: 30 ML/MIN/1.73
GLUCOSE BLD-MCNC: 93 MG/DL (ref 70–110)
INR PPP: 1.71 (ref 0.8–1.1)
OSMOLALITY SERPL CALC.SUM OF ELEC: 272.8 MOSM/KG (ref 273–305)
POTASSIUM BLD-SCNC: 4 MMOL/L (ref 3.5–5.3)
PROTHROMBIN TIME: 19.6 SECONDS (ref 9.8–11.9)
SODIUM BLD-SCNC: 136 MMOL/L (ref 135–153)

## 2017-06-26 PROCEDURE — 97116 GAIT TRAINING THERAPY: CPT | Performed by: PHYSICAL THERAPIST

## 2017-06-26 PROCEDURE — 97110 THERAPEUTIC EXERCISES: CPT | Performed by: PHYSICAL THERAPIST

## 2017-06-26 PROCEDURE — 94799 UNLISTED PULMONARY SVC/PX: CPT

## 2017-06-26 PROCEDURE — 85610 PROTHROMBIN TIME: CPT | Performed by: FAMILY MEDICINE

## 2017-06-26 PROCEDURE — 80048 BASIC METABOLIC PNL TOTAL CA: CPT | Performed by: FAMILY MEDICINE

## 2017-06-26 RX ADMIN — CHOLECALCIFEROL CAP 125 MCG (5000 UNIT) 5000 UNITS: 125 CAP at 08:38

## 2017-06-26 RX ADMIN — WARFARIN SODIUM 4 MG: 4 TABLET ORAL at 17:28

## 2017-06-26 RX ADMIN — HYDROCODONE BITARTRATE AND ACETAMINOPHEN 1 TABLET: 7.5; 325 TABLET ORAL at 22:33

## 2017-06-26 RX ADMIN — METOPROLOL TARTRATE 100 MG: 100 TABLET, FILM COATED ORAL at 08:38

## 2017-06-26 RX ADMIN — AMLODIPINE BESYLATE 2.5 MG: 10 TABLET ORAL at 08:37

## 2017-06-26 RX ADMIN — CITALOPRAM HYDROBROMIDE 20 MG: 20 TABLET ORAL at 08:38

## 2017-06-26 RX ADMIN — ISOSORBIDE MONONITRATE 60 MG: 60 TABLET, EXTENDED RELEASE ORAL at 08:38

## 2017-06-26 RX ADMIN — DOCUSATE SODIUM 100 MG: 100 CAPSULE, LIQUID FILLED ORAL at 08:37

## 2017-06-26 RX ADMIN — TUBERCULIN PURIFIED PROTEIN DERIVATIVE 5 UNITS: 5 INJECTION, SOLUTION INTRADERMAL at 14:25

## 2017-06-26 RX ADMIN — PANTOPRAZOLE SODIUM 40 MG: 40 TABLET, DELAYED RELEASE ORAL at 08:38

## 2017-06-26 RX ADMIN — METOPROLOL TARTRATE 100 MG: 100 TABLET, FILM COATED ORAL at 21:11

## 2017-06-26 RX ADMIN — TAMSULOSIN HYDROCHLORIDE 0.4 MG: 0.4 CAPSULE ORAL at 08:38

## 2017-06-27 LAB
ANION GAP SERPL CALCULATED.3IONS-SCNC: 1.5 MMOL/L (ref 3.6–11.2)
BUN BLD-MCNC: 19 MG/DL (ref 7–21)
BUN/CREAT SERPL: 10.9 (ref 7–25)
CALCIUM SPEC-SCNC: 8.8 MG/DL (ref 7.7–10)
CHLORIDE SERPL-SCNC: 102 MMOL/L (ref 99–112)
CO2 SERPL-SCNC: 32.5 MMOL/L (ref 24.3–31.9)
CREAT BLD-MCNC: 1.74 MG/DL (ref 0.43–1.29)
GFR SERPL CREATININE-BSD FRML MDRD: 28 ML/MIN/1.73
GLUCOSE BLD-MCNC: 96 MG/DL (ref 70–110)
INR PPP: 1.72 (ref 0.8–1.1)
OSMOLALITY SERPL CALC.SUM OF ELEC: 274.1 MOSM/KG (ref 273–305)
POTASSIUM BLD-SCNC: 4.2 MMOL/L (ref 3.5–5.3)
PROTHROMBIN TIME: 19.8 SECONDS (ref 9.8–11.9)
SODIUM BLD-SCNC: 136 MMOL/L (ref 135–153)

## 2017-06-27 PROCEDURE — 97530 THERAPEUTIC ACTIVITIES: CPT | Performed by: OCCUPATIONAL THERAPIST

## 2017-06-27 PROCEDURE — 80048 BASIC METABOLIC PNL TOTAL CA: CPT | Performed by: FAMILY MEDICINE

## 2017-06-27 PROCEDURE — 94799 UNLISTED PULMONARY SVC/PX: CPT

## 2017-06-27 PROCEDURE — 97110 THERAPEUTIC EXERCISES: CPT | Performed by: PHYSICAL THERAPIST

## 2017-06-27 PROCEDURE — 85610 PROTHROMBIN TIME: CPT | Performed by: FAMILY MEDICINE

## 2017-06-27 PROCEDURE — 97116 GAIT TRAINING THERAPY: CPT | Performed by: PHYSICAL THERAPIST

## 2017-06-27 RX ADMIN — DOCUSATE SODIUM 100 MG: 100 CAPSULE, LIQUID FILLED ORAL at 09:44

## 2017-06-27 RX ADMIN — CHOLECALCIFEROL CAP 125 MCG (5000 UNIT) 5000 UNITS: 125 CAP at 09:44

## 2017-06-27 RX ADMIN — WARFARIN SODIUM 4 MG: 4 TABLET ORAL at 17:19

## 2017-06-27 RX ADMIN — METOPROLOL TARTRATE 100 MG: 100 TABLET, FILM COATED ORAL at 09:44

## 2017-06-27 RX ADMIN — TAMSULOSIN HYDROCHLORIDE 0.4 MG: 0.4 CAPSULE ORAL at 09:44

## 2017-06-27 RX ADMIN — PANTOPRAZOLE SODIUM 40 MG: 40 TABLET, DELAYED RELEASE ORAL at 09:44

## 2017-06-27 RX ADMIN — HYDROCODONE BITARTRATE AND ACETAMINOPHEN 1 TABLET: 7.5; 325 TABLET ORAL at 20:25

## 2017-06-27 RX ADMIN — CITALOPRAM HYDROBROMIDE 20 MG: 20 TABLET ORAL at 09:44

## 2017-06-27 RX ADMIN — AMLODIPINE BESYLATE 2.5 MG: 10 TABLET ORAL at 09:45

## 2017-06-27 RX ADMIN — METOPROLOL TARTRATE 100 MG: 100 TABLET, FILM COATED ORAL at 20:24

## 2017-06-27 RX ADMIN — ISOSORBIDE MONONITRATE 60 MG: 60 TABLET, EXTENDED RELEASE ORAL at 09:44

## 2017-06-28 LAB
ANION GAP SERPL CALCULATED.3IONS-SCNC: 2.7 MMOL/L (ref 3.6–11.2)
BUN BLD-MCNC: 20 MG/DL (ref 7–21)
BUN/CREAT SERPL: 10.8 (ref 7–25)
CALCIUM SPEC-SCNC: 9.1 MG/DL (ref 7.7–10)
CHLORIDE SERPL-SCNC: 103 MMOL/L (ref 99–112)
CO2 SERPL-SCNC: 30.3 MMOL/L (ref 24.3–31.9)
CREAT BLD-MCNC: 1.85 MG/DL (ref 0.43–1.29)
GFR SERPL CREATININE-BSD FRML MDRD: 26 ML/MIN/1.73
GLUCOSE BLD-MCNC: 96 MG/DL (ref 70–110)
INR PPP: 1.66 (ref 0.9–1.1)
OSMOLALITY SERPL CALC.SUM OF ELEC: 274.4 MOSM/KG (ref 273–305)
POTASSIUM BLD-SCNC: 4.5 MMOL/L (ref 3.5–5.3)
PROTHROMBIN TIME: 19.8 SECONDS (ref 11–15.4)
SODIUM BLD-SCNC: 136 MMOL/L (ref 135–153)

## 2017-06-28 PROCEDURE — 94799 UNLISTED PULMONARY SVC/PX: CPT

## 2017-06-28 PROCEDURE — 80048 BASIC METABOLIC PNL TOTAL CA: CPT | Performed by: FAMILY MEDICINE

## 2017-06-28 PROCEDURE — 97535 SELF CARE MNGMENT TRAINING: CPT | Performed by: OCCUPATIONAL THERAPIST

## 2017-06-28 PROCEDURE — 97116 GAIT TRAINING THERAPY: CPT

## 2017-06-28 PROCEDURE — 97110 THERAPEUTIC EXERCISES: CPT

## 2017-06-28 PROCEDURE — 97530 THERAPEUTIC ACTIVITIES: CPT

## 2017-06-28 PROCEDURE — 85610 PROTHROMBIN TIME: CPT | Performed by: FAMILY MEDICINE

## 2017-06-28 PROCEDURE — 94760 N-INVAS EAR/PLS OXIMETRY 1: CPT

## 2017-06-28 RX ADMIN — HYDROCODONE BITARTRATE AND ACETAMINOPHEN 1 TABLET: 7.5; 325 TABLET ORAL at 20:55

## 2017-06-28 RX ADMIN — AMLODIPINE BESYLATE 2.5 MG: 10 TABLET ORAL at 09:12

## 2017-06-28 RX ADMIN — METOPROLOL TARTRATE 100 MG: 100 TABLET, FILM COATED ORAL at 09:12

## 2017-06-28 RX ADMIN — CITALOPRAM HYDROBROMIDE 20 MG: 20 TABLET ORAL at 09:12

## 2017-06-28 RX ADMIN — DOCUSATE SODIUM 100 MG: 100 CAPSULE, LIQUID FILLED ORAL at 09:12

## 2017-06-28 RX ADMIN — METOPROLOL TARTRATE 100 MG: 100 TABLET, FILM COATED ORAL at 20:52

## 2017-06-28 RX ADMIN — WARFARIN SODIUM 4 MG: 4 TABLET ORAL at 17:07

## 2017-06-28 RX ADMIN — TAMSULOSIN HYDROCHLORIDE 0.4 MG: 0.4 CAPSULE ORAL at 09:12

## 2017-06-28 RX ADMIN — ISOSORBIDE MONONITRATE 60 MG: 60 TABLET, EXTENDED RELEASE ORAL at 09:12

## 2017-06-28 RX ADMIN — CHOLECALCIFEROL CAP 125 MCG (5000 UNIT) 5000 UNITS: 125 CAP at 09:12

## 2017-06-28 RX ADMIN — PANTOPRAZOLE SODIUM 40 MG: 40 TABLET, DELAYED RELEASE ORAL at 09:12

## 2017-06-29 VITALS
OXYGEN SATURATION: 97 % | SYSTOLIC BLOOD PRESSURE: 120 MMHG | DIASTOLIC BLOOD PRESSURE: 74 MMHG | BODY MASS INDEX: 29.03 KG/M2 | WEIGHT: 185 LBS | TEMPERATURE: 98.2 F | HEIGHT: 67 IN | RESPIRATION RATE: 18 BRPM | HEART RATE: 83 BPM

## 2017-06-29 PROBLEM — K21.9 ACID REFLUX: Status: ACTIVE | Noted: 2017-06-29

## 2017-06-29 PROBLEM — I10 BP (HIGH BLOOD PRESSURE): Status: ACTIVE | Noted: 2017-06-29

## 2017-06-29 PROBLEM — M25.579 ANKLE ARTHRALGIA: Status: ACTIVE | Noted: 2017-06-29

## 2017-06-29 PROBLEM — E78.00 ELEVATED CHOLESTEROL: Status: ACTIVE | Noted: 2017-06-29

## 2017-06-29 PROBLEM — I45.9 CARDIAC CONDUCTION DISORDER: Status: ACTIVE | Noted: 2017-06-29

## 2017-06-29 PROBLEM — S82.839A CLOSED FRACTURE OF DISTAL FIBULA: Status: ACTIVE | Noted: 2017-06-29

## 2017-06-29 PROBLEM — M13.0 HYPERTROPHIC POLYARTHRITIS: Status: ACTIVE | Noted: 2017-06-29

## 2017-06-29 PROBLEM — M81.0 OP (OSTEOPOROSIS): Status: ACTIVE | Noted: 2017-06-29

## 2017-06-29 PROBLEM — I25.10 ARTERIOSCLEROSIS OF CORONARY ARTERY: Status: ACTIVE | Noted: 2017-06-29

## 2017-06-29 PROBLEM — S82.309A FRACTURE OF DISTAL END OF TIBIA: Status: ACTIVE | Noted: 2017-06-29

## 2017-06-29 PROBLEM — R60.9 EDEMA: Status: RESOLVED | Noted: 2017-06-09 | Resolved: 2017-06-29

## 2017-06-29 LAB
ANION GAP SERPL CALCULATED.3IONS-SCNC: 6.9 MMOL/L (ref 3.6–11.2)
BUN BLD-MCNC: 21 MG/DL (ref 7–21)
BUN/CREAT SERPL: 11.4 (ref 7–25)
CALCIUM SPEC-SCNC: 9.5 MG/DL (ref 7.7–10)
CHLORIDE SERPL-SCNC: 104 MMOL/L (ref 99–112)
CO2 SERPL-SCNC: 26.1 MMOL/L (ref 24.3–31.9)
CREAT BLD-MCNC: 1.84 MG/DL (ref 0.43–1.29)
GFR SERPL CREATININE-BSD FRML MDRD: 26 ML/MIN/1.73
GLUCOSE BLD-MCNC: 90 MG/DL (ref 70–110)
INR PPP: 1.55 (ref 0.9–1.1)
OSMOLALITY SERPL CALC.SUM OF ELEC: 276.3 MOSM/KG (ref 273–305)
POTASSIUM BLD-SCNC: 4.8 MMOL/L (ref 3.5–5.3)
PROTHROMBIN TIME: 18.8 SECONDS (ref 11–15.4)
SODIUM BLD-SCNC: 137 MMOL/L (ref 135–153)

## 2017-06-29 PROCEDURE — G8989 SELF CARE D/C STATUS: HCPCS | Performed by: OCCUPATIONAL THERAPIST

## 2017-06-29 PROCEDURE — G8987 SELF CARE CURRENT STATUS: HCPCS | Performed by: OCCUPATIONAL THERAPIST

## 2017-06-29 PROCEDURE — 80048 BASIC METABOLIC PNL TOTAL CA: CPT | Performed by: FAMILY MEDICINE

## 2017-06-29 PROCEDURE — 94799 UNLISTED PULMONARY SVC/PX: CPT

## 2017-06-29 PROCEDURE — 85610 PROTHROMBIN TIME: CPT | Performed by: FAMILY MEDICINE

## 2017-06-29 RX ADMIN — METOPROLOL TARTRATE 100 MG: 100 TABLET, FILM COATED ORAL at 09:03

## 2017-06-29 RX ADMIN — AMLODIPINE BESYLATE 2.5 MG: 10 TABLET ORAL at 09:03

## 2017-06-29 RX ADMIN — ISOSORBIDE MONONITRATE 60 MG: 60 TABLET, EXTENDED RELEASE ORAL at 09:04

## 2017-06-29 RX ADMIN — CITALOPRAM HYDROBROMIDE 20 MG: 20 TABLET ORAL at 09:03

## 2017-06-29 RX ADMIN — CHOLECALCIFEROL CAP 125 MCG (5000 UNIT) 5000 UNITS: 125 CAP at 09:02

## 2017-06-29 RX ADMIN — DOCUSATE SODIUM 100 MG: 100 CAPSULE, LIQUID FILLED ORAL at 09:03

## 2017-06-29 RX ADMIN — PANTOPRAZOLE SODIUM 40 MG: 40 TABLET, DELAYED RELEASE ORAL at 09:03

## 2017-06-29 RX ADMIN — TAMSULOSIN HYDROCHLORIDE 0.4 MG: 0.4 CAPSULE ORAL at 09:03

## 2017-07-27 ENCOUNTER — HOSPITAL ENCOUNTER (INPATIENT)
Facility: HOSPITAL | Age: 82
LOS: 7 days | Discharge: HOME HEALTH | End: 2017-08-05
Attending: FAMILY MEDICINE | Admitting: FAMILY MEDICINE

## 2017-07-27 DIAGNOSIS — I50.810 RIGHT HEART FAILURE (HCC): Primary | ICD-10-CM

## 2017-07-27 LAB
ANION GAP SERPL CALCULATED.3IONS-SCNC: 9.9 MMOL/L (ref 3.6–11.2)
ANISOCYTOSIS BLD QL: NORMAL
BASOPHILS # BLD AUTO: 0.05 10*3/MM3 (ref 0–0.3)
BASOPHILS NFR BLD AUTO: 0.8 % (ref 0–2)
BNP SERPL-MCNC: 1378 PG/ML (ref 0–100)
BUN BLD-MCNC: 22 MG/DL (ref 7–21)
BUN/CREAT SERPL: 12.8 (ref 7–25)
CALCIUM SPEC-SCNC: 9 MG/DL (ref 7.7–10)
CHLORIDE SERPL-SCNC: 105 MMOL/L (ref 99–112)
CO2 SERPL-SCNC: 22.1 MMOL/L (ref 24.3–31.9)
CREAT BLD-MCNC: 1.72 MG/DL (ref 0.43–1.29)
DEPRECATED RDW RBC AUTO: 67.9 FL (ref 37–54)
EOSINOPHIL # BLD AUTO: 0.4 10*3/MM3 (ref 0–0.7)
EOSINOPHIL NFR BLD AUTO: 6.6 % (ref 0–7)
ERYTHROCYTE [DISTWIDTH] IN BLOOD BY AUTOMATED COUNT: 22.7 % (ref 11.5–14.5)
GFR SERPL CREATININE-BSD FRML MDRD: 28 ML/MIN/1.73
GLUCOSE BLD-MCNC: 99 MG/DL (ref 70–110)
HCT VFR BLD AUTO: 36.6 % (ref 37–47)
HGB BLD-MCNC: 11.4 G/DL (ref 12–16)
HYPOCHROMIA BLD QL: NORMAL
IMM GRANULOCYTES # BLD: 0 10*3/MM3 (ref 0–0.03)
IMM GRANULOCYTES NFR BLD: 0 % (ref 0–0.5)
INR PPP: 4.39 (ref 0.9–1.1)
LYMPHOCYTES # BLD AUTO: 0.74 10*3/MM3 (ref 1–3)
LYMPHOCYTES NFR BLD AUTO: 12.3 % (ref 16–46)
MCH RBC QN AUTO: 26.4 PG (ref 27–33)
MCHC RBC AUTO-ENTMCNC: 31.1 G/DL (ref 33–37)
MCV RBC AUTO: 84.7 FL (ref 80–94)
MONOCYTES # BLD AUTO: 0.53 10*3/MM3 (ref 0.1–0.9)
MONOCYTES NFR BLD AUTO: 8.8 % (ref 0–12)
NEUTROPHILS # BLD AUTO: 4.32 10*3/MM3 (ref 1.4–6.5)
NEUTROPHILS NFR BLD AUTO: 71.5 % (ref 40–75)
OSMOLALITY SERPL CALC.SUM OF ELEC: 277.2 MOSM/KG (ref 273–305)
PLAT MORPH BLD: NORMAL
PLATELET # BLD AUTO: 212 10*3/MM3 (ref 130–400)
PMV BLD AUTO: 9.7 FL (ref 6–10)
POTASSIUM BLD-SCNC: 3.8 MMOL/L (ref 3.5–5.3)
PROTHROMBIN TIME: 42.6 SECONDS (ref 11–15.4)
RBC # BLD AUTO: 4.32 10*6/MM3 (ref 4.2–5.4)
SODIUM BLD-SCNC: 137 MMOL/L (ref 135–153)
WBC NRBC COR # BLD: 6.04 10*3/MM3 (ref 4.5–12.5)

## 2017-07-27 PROCEDURE — 83880 ASSAY OF NATRIURETIC PEPTIDE: CPT | Performed by: FAMILY MEDICINE

## 2017-07-27 PROCEDURE — 85007 BL SMEAR W/DIFF WBC COUNT: CPT | Performed by: FAMILY MEDICINE

## 2017-07-27 PROCEDURE — G0378 HOSPITAL OBSERVATION PER HR: HCPCS

## 2017-07-27 PROCEDURE — 25010000002 HEPARIN (PORCINE) PER 1000 UNITS: Performed by: FAMILY MEDICINE

## 2017-07-27 PROCEDURE — 85025 COMPLETE CBC W/AUTO DIFF WBC: CPT | Performed by: FAMILY MEDICINE

## 2017-07-27 PROCEDURE — 85610 PROTHROMBIN TIME: CPT | Performed by: FAMILY MEDICINE

## 2017-07-27 PROCEDURE — 80048 BASIC METABOLIC PNL TOTAL CA: CPT | Performed by: FAMILY MEDICINE

## 2017-07-27 RX ORDER — ISOSORBIDE MONONITRATE 60 MG/1
60 TABLET, EXTENDED RELEASE ORAL DAILY
Status: DISCONTINUED | OUTPATIENT
Start: 2017-07-28 | End: 2017-08-05 | Stop reason: HOSPADM

## 2017-07-27 RX ORDER — PANTOPRAZOLE SODIUM 40 MG/1
40 TABLET, DELAYED RELEASE ORAL
Status: DISCONTINUED | OUTPATIENT
Start: 2017-07-28 | End: 2017-08-05 | Stop reason: HOSPADM

## 2017-07-27 RX ORDER — HEPARIN SODIUM 5000 [USP'U]/ML
5000 INJECTION, SOLUTION INTRAVENOUS; SUBCUTANEOUS EVERY 12 HOURS SCHEDULED
Status: DISCONTINUED | OUTPATIENT
Start: 2017-07-27 | End: 2017-07-28

## 2017-07-27 RX ORDER — AMLODIPINE BESYLATE 10 MG/1
10 TABLET ORAL DAILY
Status: DISCONTINUED | OUTPATIENT
Start: 2017-07-28 | End: 2017-07-28

## 2017-07-27 RX ORDER — FUROSEMIDE 20 MG/1
20 TABLET ORAL 2 TIMES DAILY
Status: DISCONTINUED | OUTPATIENT
Start: 2017-07-27 | End: 2017-07-28

## 2017-07-27 RX ORDER — ALPRAZOLAM 0.5 MG/1
0.5 TABLET ORAL 2 TIMES DAILY
Status: DISCONTINUED | OUTPATIENT
Start: 2017-07-27 | End: 2017-08-05 | Stop reason: HOSPADM

## 2017-07-27 RX ORDER — WARFARIN SODIUM 6 MG/1
6 TABLET ORAL
Status: ON HOLD | COMMUNITY
End: 2017-11-08

## 2017-07-27 RX ORDER — WARFARIN SODIUM 3 MG/1
6 TABLET ORAL
Status: CANCELLED | OUTPATIENT
Start: 2017-07-27

## 2017-07-27 RX ORDER — HYDROCODONE BITARTRATE AND ACETAMINOPHEN 7.5; 325 MG/1; MG/1
1 TABLET ORAL 2 TIMES DAILY
Status: DISCONTINUED | OUTPATIENT
Start: 2017-07-27 | End: 2017-07-27 | Stop reason: SDUPTHER

## 2017-07-27 RX ORDER — CITALOPRAM 20 MG/1
20 TABLET ORAL DAILY
Status: DISCONTINUED | OUTPATIENT
Start: 2017-07-28 | End: 2017-08-05 | Stop reason: HOSPADM

## 2017-07-27 RX ORDER — SODIUM CHLORIDE 0.9 % (FLUSH) 0.9 %
1-10 SYRINGE (ML) INJECTION AS NEEDED
Status: DISCONTINUED | OUTPATIENT
Start: 2017-07-27 | End: 2017-08-05 | Stop reason: HOSPADM

## 2017-07-27 RX ORDER — METOPROLOL TARTRATE 100 MG/1
100 TABLET ORAL EVERY 12 HOURS SCHEDULED
Status: DISCONTINUED | OUTPATIENT
Start: 2017-07-27 | End: 2017-08-01

## 2017-07-27 RX ORDER — HYDROCODONE BITARTRATE AND ACETAMINOPHEN 7.5; 325 MG/1; MG/1
1 TABLET ORAL EVERY 12 HOURS PRN
Status: DISCONTINUED | OUTPATIENT
Start: 2017-07-27 | End: 2017-08-05 | Stop reason: HOSPADM

## 2017-07-27 RX ADMIN — HEPARIN SODIUM 5000 UNITS: 5000 INJECTION, SOLUTION INTRAVENOUS; SUBCUTANEOUS at 20:38

## 2017-07-27 RX ADMIN — METOPROLOL TARTRATE 100 MG: 100 TABLET, FILM COATED ORAL at 20:38

## 2017-07-27 RX ADMIN — HYDROCODONE BITARTRATE AND ACETAMINOPHEN 1 TABLET: 7.5; 325 TABLET ORAL at 20:40

## 2017-07-27 RX ADMIN — FUROSEMIDE 20 MG: 20 TABLET ORAL at 20:38

## 2017-07-27 RX ADMIN — ALPRAZOLAM 0.5 MG: 0.5 TABLET ORAL at 20:38

## 2017-07-28 ENCOUNTER — APPOINTMENT (OUTPATIENT)
Dept: CARDIOLOGY | Facility: HOSPITAL | Age: 82
End: 2017-07-28
Attending: INTERNAL MEDICINE

## 2017-07-28 LAB
BH CV ECHO MEAS - % IVS THICK: 41 %
BH CV ECHO MEAS - % LVPW THICK: 51.3 %
BH CV ECHO MEAS - ACS: 1.9 CM
BH CV ECHO MEAS - AO ROOT AREA (BSA CORRECTED): 1.4
BH CV ECHO MEAS - AO ROOT AREA: 6.4 CM^2
BH CV ECHO MEAS - AO ROOT DIAM: 2.9 CM
BH CV ECHO MEAS - BSA(HAYCOCK): 2.1 M^2
BH CV ECHO MEAS - BSA: 2 M^2
BH CV ECHO MEAS - BZI_BMI: 30.9 KILOGRAMS/M^2
BH CV ECHO MEAS - BZI_METRIC_HEIGHT: 170.2 CM
BH CV ECHO MEAS - BZI_METRIC_WEIGHT: 89.4 KG
BH CV ECHO MEAS - CONTRAST EF 4CH: 47.6 ML/M^2
BH CV ECHO MEAS - EDV(CUBED): 172.1 ML
BH CV ECHO MEAS - EDV(MOD-SP4): 63 ML
BH CV ECHO MEAS - EDV(TEICH): 151.3 ML
BH CV ECHO MEAS - EF(CUBED): 45.7 %
BH CV ECHO MEAS - EF(MOD-SP4): 47.6 %
BH CV ECHO MEAS - EF(TEICH): 37.7 %
BH CV ECHO MEAS - ESV(CUBED): 93.5 ML
BH CV ECHO MEAS - ESV(MOD-SP4): 33 ML
BH CV ECHO MEAS - ESV(TEICH): 94.3 ML
BH CV ECHO MEAS - FS: 18.4 %
BH CV ECHO MEAS - IVS/LVPW: 1
BH CV ECHO MEAS - IVSD: 0.98 CM
BH CV ECHO MEAS - IVSS: 1.4 CM
BH CV ECHO MEAS - LA DIMENSION: 5.5 CM
BH CV ECHO MEAS - LA/AO: 1.9
BH CV ECHO MEAS - LV DIASTOLIC VOL/BSA (35-75): 31.4 ML/M^2
BH CV ECHO MEAS - LV MASS(C)D: 211.7 GRAMS
BH CV ECHO MEAS - LV MASS(C)DI: 105.4 GRAMS/M^2
BH CV ECHO MEAS - LV MASS(C)S: 260.5 GRAMS
BH CV ECHO MEAS - LV MASS(C)SI: 129.6 GRAMS/M^2
BH CV ECHO MEAS - LV SYSTOLIC VOL/BSA (12-30): 16.4 ML/M^2
BH CV ECHO MEAS - LVIDD: 5.6 CM
BH CV ECHO MEAS - LVIDS: 4.5 CM
BH CV ECHO MEAS - LVLD AP4: 6.4 CM
BH CV ECHO MEAS - LVLS AP4: 6 CM
BH CV ECHO MEAS - LVOT AREA (M): 2.5 CM^2
BH CV ECHO MEAS - LVOT AREA: 2.6 CM^2
BH CV ECHO MEAS - LVOT DIAM: 1.8 CM
BH CV ECHO MEAS - LVPWD: 0.98 CM
BH CV ECHO MEAS - LVPWS: 1.5 CM
BH CV ECHO MEAS - PA ACC SLOPE: 2027 CM/SEC^2
BH CV ECHO MEAS - PA ACC TIME: 0.04 SEC
BH CV ECHO MEAS - PA PR(ACCEL): 60 MMHG
BH CV ECHO MEAS - RAP SYSTOLE: 10 MMHG
BH CV ECHO MEAS - RVDD: 4 CM
BH CV ECHO MEAS - RVSP: 54.4 MMHG
BH CV ECHO MEAS - SI(CUBED): 39.2 ML/M^2
BH CV ECHO MEAS - SI(MOD-SP4): 14.9 ML/M^2
BH CV ECHO MEAS - SI(TEICH): 28.4 ML/M^2
BH CV ECHO MEAS - SV(CUBED): 78.7 ML
BH CV ECHO MEAS - SV(MOD-SP4): 30 ML
BH CV ECHO MEAS - SV(TEICH): 57 ML
BH CV ECHO MEAS - TR MAX VEL: 333.3 CM/SEC
INR PPP: 3.93 (ref 0.9–1.1)
PROTHROMBIN TIME: 39.1 SECONDS (ref 11–15.4)

## 2017-07-28 PROCEDURE — 25010000002 HEPARIN (PORCINE) PER 1000 UNITS: Performed by: FAMILY MEDICINE

## 2017-07-28 PROCEDURE — G0378 HOSPITAL OBSERVATION PER HR: HCPCS

## 2017-07-28 PROCEDURE — 99222 1ST HOSP IP/OBS MODERATE 55: CPT | Performed by: INTERNAL MEDICINE

## 2017-07-28 PROCEDURE — 93306 TTE W/DOPPLER COMPLETE: CPT | Performed by: INTERNAL MEDICINE

## 2017-07-28 PROCEDURE — 25010000002 FUROSEMIDE PER 20 MG: Performed by: INTERNAL MEDICINE

## 2017-07-28 PROCEDURE — 93306 TTE W/DOPPLER COMPLETE: CPT

## 2017-07-28 PROCEDURE — 85610 PROTHROMBIN TIME: CPT | Performed by: FAMILY MEDICINE

## 2017-07-28 RX ORDER — ATORVASTATIN CALCIUM 40 MG/1
40 TABLET, FILM COATED ORAL NIGHTLY
Status: DISCONTINUED | OUTPATIENT
Start: 2017-07-28 | End: 2017-08-05 | Stop reason: HOSPADM

## 2017-07-28 RX ORDER — ASPIRIN 81 MG/1
81 TABLET ORAL DAILY
Status: DISCONTINUED | OUTPATIENT
Start: 2017-07-28 | End: 2017-08-05 | Stop reason: HOSPADM

## 2017-07-28 RX ORDER — FUROSEMIDE 10 MG/ML
40 INJECTION INTRAMUSCULAR; INTRAVENOUS ONCE
Status: COMPLETED | OUTPATIENT
Start: 2017-07-28 | End: 2017-07-28

## 2017-07-28 RX ORDER — HYDRALAZINE HYDROCHLORIDE 10 MG/1
10 TABLET, FILM COATED ORAL EVERY 8 HOURS SCHEDULED
Status: DISCONTINUED | OUTPATIENT
Start: 2017-07-28 | End: 2017-07-30

## 2017-07-28 RX ADMIN — FUROSEMIDE 20 MG: 20 TABLET ORAL at 08:19

## 2017-07-28 RX ADMIN — ASPIRIN 81 MG: 81 TABLET ORAL at 17:18

## 2017-07-28 RX ADMIN — CITALOPRAM HYDROBROMIDE 20 MG: 20 TABLET ORAL at 08:19

## 2017-07-28 RX ADMIN — PANTOPRAZOLE SODIUM 40 MG: 40 TABLET, DELAYED RELEASE ORAL at 05:00

## 2017-07-28 RX ADMIN — HYDRALAZINE HYDROCHLORIDE 10 MG: 10 TABLET ORAL at 17:17

## 2017-07-28 RX ADMIN — METOPROLOL TARTRATE 100 MG: 100 TABLET, FILM COATED ORAL at 08:19

## 2017-07-28 RX ADMIN — HYDROCODONE BITARTRATE AND ACETAMINOPHEN 1 TABLET: 7.5; 325 TABLET ORAL at 17:17

## 2017-07-28 RX ADMIN — ALPRAZOLAM 0.5 MG: 0.5 TABLET ORAL at 19:58

## 2017-07-28 RX ADMIN — HYDRALAZINE HYDROCHLORIDE 10 MG: 10 TABLET ORAL at 19:58

## 2017-07-28 RX ADMIN — ALPRAZOLAM 0.5 MG: 0.5 TABLET ORAL at 08:19

## 2017-07-28 RX ADMIN — CHOLECALCIFEROL CAP 125 MCG (5000 UNIT) 5000 UNITS: 125 CAP at 08:19

## 2017-07-28 RX ADMIN — ISOSORBIDE MONONITRATE 60 MG: 60 TABLET, EXTENDED RELEASE ORAL at 08:19

## 2017-07-28 RX ADMIN — FUROSEMIDE 40 MG: 10 INJECTION, SOLUTION INTRAMUSCULAR; INTRAVENOUS at 17:19

## 2017-07-28 RX ADMIN — METOPROLOL TARTRATE 100 MG: 100 TABLET, FILM COATED ORAL at 19:58

## 2017-07-28 RX ADMIN — AMLODIPINE BESYLATE 10 MG: 10 TABLET ORAL at 08:20

## 2017-07-28 RX ADMIN — HEPARIN SODIUM 5000 UNITS: 5000 INJECTION, SOLUTION INTRAVENOUS; SUBCUTANEOUS at 08:19

## 2017-07-28 RX ADMIN — ATORVASTATIN CALCIUM 40 MG: 40 TABLET, FILM COATED ORAL at 19:58

## 2017-07-29 PROBLEM — I50.812: Status: ACTIVE | Noted: 2017-07-29

## 2017-07-29 LAB
ALBUMIN SERPL-MCNC: 3.5 G/DL (ref 3.4–4.8)
ALBUMIN/GLOB SERPL: 1.4 G/DL (ref 1.5–2.5)
ALP SERPL-CCNC: 45 U/L (ref 35–104)
ALT SERPL W P-5'-P-CCNC: 12 U/L (ref 10–36)
ANION GAP SERPL CALCULATED.3IONS-SCNC: 5.3 MMOL/L (ref 3.6–11.2)
AST SERPL-CCNC: 15 U/L (ref 10–30)
BILIRUB SERPL-MCNC: 0.7 MG/DL (ref 0.2–1.8)
BNP SERPL-MCNC: 1632 PG/ML (ref 0–100)
BUN BLD-MCNC: 28 MG/DL (ref 7–21)
BUN/CREAT SERPL: 15.3 (ref 7–25)
CALCIUM SPEC-SCNC: 8.7 MG/DL (ref 7.7–10)
CHLORIDE SERPL-SCNC: 107 MMOL/L (ref 99–112)
CO2 SERPL-SCNC: 27.7 MMOL/L (ref 24.3–31.9)
CREAT BLD-MCNC: 1.83 MG/DL (ref 0.43–1.29)
GFR SERPL CREATININE-BSD FRML MDRD: 26 ML/MIN/1.73
GLOBULIN UR ELPH-MCNC: 2.5 GM/DL
GLUCOSE BLD-MCNC: 101 MG/DL (ref 70–110)
INR PPP: 3.25 (ref 0.9–1.1)
OSMOLALITY SERPL CALC.SUM OF ELEC: 285 MOSM/KG (ref 273–305)
POTASSIUM BLD-SCNC: 3.9 MMOL/L (ref 3.5–5.3)
PROT SERPL-MCNC: 6 G/DL (ref 6–8)
PROTHROMBIN TIME: 33.6 SECONDS (ref 11–15.4)
SODIUM BLD-SCNC: 140 MMOL/L (ref 135–153)

## 2017-07-29 PROCEDURE — 99232 SBSQ HOSP IP/OBS MODERATE 35: CPT | Performed by: INTERNAL MEDICINE

## 2017-07-29 PROCEDURE — 85610 PROTHROMBIN TIME: CPT | Performed by: FAMILY MEDICINE

## 2017-07-29 PROCEDURE — 83880 ASSAY OF NATRIURETIC PEPTIDE: CPT | Performed by: INTERNAL MEDICINE

## 2017-07-29 PROCEDURE — 25010000002 FUROSEMIDE PER 20 MG: Performed by: INTERNAL MEDICINE

## 2017-07-29 PROCEDURE — 80053 COMPREHEN METABOLIC PANEL: CPT | Performed by: INTERNAL MEDICINE

## 2017-07-29 RX ORDER — WARFARIN SODIUM 3 MG/1
3 TABLET ORAL
Status: COMPLETED | OUTPATIENT
Start: 2017-07-29 | End: 2017-07-29

## 2017-07-29 RX ORDER — FUROSEMIDE 10 MG/ML
40 INJECTION INTRAMUSCULAR; INTRAVENOUS ONCE
Status: COMPLETED | OUTPATIENT
Start: 2017-07-29 | End: 2017-07-29

## 2017-07-29 RX ADMIN — ATORVASTATIN CALCIUM 40 MG: 40 TABLET, FILM COATED ORAL at 20:05

## 2017-07-29 RX ADMIN — METOPROLOL TARTRATE 100 MG: 100 TABLET, FILM COATED ORAL at 20:05

## 2017-07-29 RX ADMIN — METOPROLOL TARTRATE 100 MG: 100 TABLET, FILM COATED ORAL at 09:17

## 2017-07-29 RX ADMIN — WARFARIN SODIUM 3 MG: 3 TABLET ORAL at 16:57

## 2017-07-29 RX ADMIN — HYDROCODONE BITARTRATE AND ACETAMINOPHEN 1 TABLET: 7.5; 325 TABLET ORAL at 20:05

## 2017-07-29 RX ADMIN — HYDRALAZINE HYDROCHLORIDE 10 MG: 10 TABLET ORAL at 05:10

## 2017-07-29 RX ADMIN — HYDRALAZINE HYDROCHLORIDE 10 MG: 10 TABLET ORAL at 13:09

## 2017-07-29 RX ADMIN — ISOSORBIDE MONONITRATE 60 MG: 60 TABLET, EXTENDED RELEASE ORAL at 09:17

## 2017-07-29 RX ADMIN — FUROSEMIDE 40 MG: 10 INJECTION, SOLUTION INTRAMUSCULAR; INTRAVENOUS at 13:09

## 2017-07-29 RX ADMIN — ALPRAZOLAM 0.5 MG: 0.5 TABLET ORAL at 20:05

## 2017-07-29 RX ADMIN — PANTOPRAZOLE SODIUM 40 MG: 40 TABLET, DELAYED RELEASE ORAL at 05:10

## 2017-07-29 RX ADMIN — CHOLECALCIFEROL CAP 125 MCG (5000 UNIT) 5000 UNITS: 125 CAP at 09:17

## 2017-07-29 RX ADMIN — ALPRAZOLAM 0.5 MG: 0.5 TABLET ORAL at 09:17

## 2017-07-29 RX ADMIN — CITALOPRAM HYDROBROMIDE 20 MG: 20 TABLET ORAL at 09:17

## 2017-07-29 RX ADMIN — ASPIRIN 81 MG: 81 TABLET ORAL at 09:17

## 2017-07-29 RX ADMIN — HYDRALAZINE HYDROCHLORIDE 10 MG: 10 TABLET ORAL at 20:05

## 2017-07-30 LAB
ANION GAP SERPL CALCULATED.3IONS-SCNC: 5.2 MMOL/L (ref 3.6–11.2)
BASOPHILS # BLD AUTO: 0.03 10*3/MM3 (ref 0–0.3)
BASOPHILS NFR BLD AUTO: 0.7 % (ref 0–2)
BUN BLD-MCNC: 29 MG/DL (ref 7–21)
BUN/CREAT SERPL: 16 (ref 7–25)
CALCIUM SPEC-SCNC: 8.8 MG/DL (ref 7.7–10)
CHLORIDE SERPL-SCNC: 104 MMOL/L (ref 99–112)
CHOLEST SERPL-MCNC: 140 MG/DL (ref 0–200)
CO2 SERPL-SCNC: 30.8 MMOL/L (ref 24.3–31.9)
CREAT BLD-MCNC: 1.81 MG/DL (ref 0.43–1.29)
DEPRECATED RDW RBC AUTO: 66.3 FL (ref 37–54)
EOSINOPHIL # BLD AUTO: 0.48 10*3/MM3 (ref 0–0.7)
EOSINOPHIL NFR BLD AUTO: 10.9 % (ref 0–7)
ERYTHROCYTE [DISTWIDTH] IN BLOOD BY AUTOMATED COUNT: 21.9 % (ref 11.5–14.5)
GFR SERPL CREATININE-BSD FRML MDRD: 27 ML/MIN/1.73
GLUCOSE BLD-MCNC: 91 MG/DL (ref 70–110)
HCT VFR BLD AUTO: 34.3 % (ref 37–47)
HDLC SERPL-MCNC: 40 MG/DL (ref 60–100)
HGB BLD-MCNC: 10.8 G/DL (ref 12–16)
IMM GRANULOCYTES # BLD: 0.01 10*3/MM3 (ref 0–0.03)
IMM GRANULOCYTES NFR BLD: 0.2 % (ref 0–0.5)
INR PPP: 2.72 (ref 0.9–1.1)
LDLC SERPL CALC-MCNC: 85 MG/DL (ref 0–100)
LDLC/HDLC SERPL: 2.12 {RATIO}
LYMPHOCYTES # BLD AUTO: 0.9 10*3/MM3 (ref 1–3)
LYMPHOCYTES NFR BLD AUTO: 20.4 % (ref 16–46)
MCH RBC QN AUTO: 26.3 PG (ref 27–33)
MCHC RBC AUTO-ENTMCNC: 31.5 G/DL (ref 33–37)
MCV RBC AUTO: 83.7 FL (ref 80–94)
MONOCYTES # BLD AUTO: 0.35 10*3/MM3 (ref 0.1–0.9)
MONOCYTES NFR BLD AUTO: 7.9 % (ref 0–12)
NEUTROPHILS # BLD AUTO: 2.65 10*3/MM3 (ref 1.4–6.5)
NEUTROPHILS NFR BLD AUTO: 59.9 % (ref 40–75)
OSMOLALITY SERPL CALC.SUM OF ELEC: 284.8 MOSM/KG (ref 273–305)
PLATELET # BLD AUTO: 183 10*3/MM3 (ref 130–400)
PMV BLD AUTO: 10 FL (ref 6–10)
POTASSIUM BLD-SCNC: 3.8 MMOL/L (ref 3.5–5.3)
PROTHROMBIN TIME: 29.2 SECONDS (ref 11–15.4)
RBC # BLD AUTO: 4.1 10*6/MM3 (ref 4.2–5.4)
SODIUM BLD-SCNC: 140 MMOL/L (ref 135–153)
TRIGL SERPL-MCNC: 77 MG/DL (ref 0–150)
VLDLC SERPL-MCNC: 15.4 MG/DL
WBC NRBC COR # BLD: 4.42 10*3/MM3 (ref 4.5–12.5)

## 2017-07-30 PROCEDURE — 93010 ELECTROCARDIOGRAM REPORT: CPT | Performed by: INTERNAL MEDICINE

## 2017-07-30 PROCEDURE — 80061 LIPID PANEL: CPT | Performed by: INTERNAL MEDICINE

## 2017-07-30 PROCEDURE — 25010000002 FUROSEMIDE PER 20 MG: Performed by: INTERNAL MEDICINE

## 2017-07-30 PROCEDURE — 80048 BASIC METABOLIC PNL TOTAL CA: CPT | Performed by: FAMILY MEDICINE

## 2017-07-30 PROCEDURE — 85610 PROTHROMBIN TIME: CPT | Performed by: FAMILY MEDICINE

## 2017-07-30 PROCEDURE — 99232 SBSQ HOSP IP/OBS MODERATE 35: CPT | Performed by: INTERNAL MEDICINE

## 2017-07-30 PROCEDURE — 93005 ELECTROCARDIOGRAM TRACING: CPT | Performed by: INTERNAL MEDICINE

## 2017-07-30 PROCEDURE — 85025 COMPLETE CBC W/AUTO DIFF WBC: CPT | Performed by: FAMILY MEDICINE

## 2017-07-30 RX ORDER — WARFARIN SODIUM 4 MG/1
4 TABLET ORAL
Status: COMPLETED | OUTPATIENT
Start: 2017-07-30 | End: 2017-07-30

## 2017-07-30 RX ORDER — ALUMINA, MAGNESIA, AND SIMETHICONE 2400; 2400; 240 MG/30ML; MG/30ML; MG/30ML
15 SUSPENSION ORAL EVERY 6 HOURS PRN
Status: DISCONTINUED | OUTPATIENT
Start: 2017-07-30 | End: 2017-08-05 | Stop reason: HOSPADM

## 2017-07-30 RX ORDER — HYDRALAZINE HYDROCHLORIDE 25 MG/1
25 TABLET, FILM COATED ORAL EVERY 8 HOURS SCHEDULED
Status: DISCONTINUED | OUTPATIENT
Start: 2017-07-30 | End: 2017-08-02

## 2017-07-30 RX ORDER — FUROSEMIDE 10 MG/ML
40 INJECTION INTRAMUSCULAR; INTRAVENOUS ONCE
Status: COMPLETED | OUTPATIENT
Start: 2017-07-30 | End: 2017-07-30

## 2017-07-30 RX ADMIN — HYDROCODONE BITARTRATE AND ACETAMINOPHEN 1 TABLET: 7.5; 325 TABLET ORAL at 20:01

## 2017-07-30 RX ADMIN — ASPIRIN 81 MG: 81 TABLET ORAL at 07:44

## 2017-07-30 RX ADMIN — ALPRAZOLAM 0.5 MG: 0.5 TABLET ORAL at 20:01

## 2017-07-30 RX ADMIN — METOPROLOL TARTRATE 100 MG: 100 TABLET, FILM COATED ORAL at 07:44

## 2017-07-30 RX ADMIN — HYDRALAZINE HYDROCHLORIDE 25 MG: 50 TABLET ORAL at 13:19

## 2017-07-30 RX ADMIN — ALPRAZOLAM 0.5 MG: 0.5 TABLET ORAL at 07:44

## 2017-07-30 RX ADMIN — WARFARIN SODIUM 4 MG: 4 TABLET ORAL at 17:21

## 2017-07-30 RX ADMIN — ISOSORBIDE MONONITRATE 60 MG: 60 TABLET, EXTENDED RELEASE ORAL at 07:44

## 2017-07-30 RX ADMIN — FUROSEMIDE 40 MG: 10 INJECTION, SOLUTION INTRAMUSCULAR; INTRAVENOUS at 13:19

## 2017-07-30 RX ADMIN — METOPROLOL TARTRATE 100 MG: 100 TABLET, FILM COATED ORAL at 20:01

## 2017-07-30 RX ADMIN — HYDRALAZINE HYDROCHLORIDE 10 MG: 10 TABLET ORAL at 05:00

## 2017-07-30 RX ADMIN — ATORVASTATIN CALCIUM 40 MG: 40 TABLET, FILM COATED ORAL at 20:01

## 2017-07-30 RX ADMIN — HYDRALAZINE HYDROCHLORIDE 25 MG: 50 TABLET ORAL at 20:01

## 2017-07-30 RX ADMIN — PANTOPRAZOLE SODIUM 40 MG: 40 TABLET, DELAYED RELEASE ORAL at 05:00

## 2017-07-30 RX ADMIN — CHOLECALCIFEROL CAP 125 MCG (5000 UNIT) 5000 UNITS: 125 CAP at 07:44

## 2017-07-30 RX ADMIN — CITALOPRAM HYDROBROMIDE 20 MG: 20 TABLET ORAL at 07:44

## 2017-07-31 LAB
ANION GAP SERPL CALCULATED.3IONS-SCNC: 4.2 MMOL/L (ref 3.6–11.2)
ANISOCYTOSIS BLD QL: NORMAL
BASOPHILS # BLD AUTO: 0.05 10*3/MM3 (ref 0–0.3)
BASOPHILS NFR BLD AUTO: 1 % (ref 0–2)
BUN BLD-MCNC: 24 MG/DL (ref 7–21)
BUN/CREAT SERPL: 13.1 (ref 7–25)
CALCIUM SPEC-SCNC: 9.1 MG/DL (ref 7.7–10)
CHLORIDE SERPL-SCNC: 107 MMOL/L (ref 99–112)
CO2 SERPL-SCNC: 27.8 MMOL/L (ref 24.3–31.9)
CREAT BLD-MCNC: 1.83 MG/DL (ref 0.43–1.29)
DEPRECATED RDW RBC AUTO: 66.4 FL (ref 37–54)
EOSINOPHIL # BLD AUTO: 0.52 10*3/MM3 (ref 0–0.7)
EOSINOPHIL NFR BLD AUTO: 10 % (ref 0–7)
ERYTHROCYTE [DISTWIDTH] IN BLOOD BY AUTOMATED COUNT: 22.2 % (ref 11.5–14.5)
GFR SERPL CREATININE-BSD FRML MDRD: 26 ML/MIN/1.73
GLUCOSE BLD-MCNC: 95 MG/DL (ref 70–110)
HCT VFR BLD AUTO: 34.1 % (ref 37–47)
HGB BLD-MCNC: 10.4 G/DL (ref 12–16)
HYPOCHROMIA BLD QL: NORMAL
IMM GRANULOCYTES # BLD: 0.01 10*3/MM3 (ref 0–0.03)
IMM GRANULOCYTES NFR BLD: 0.2 % (ref 0–0.5)
INR PPP: 2.27 (ref 0.9–1.1)
LYMPHOCYTES # BLD AUTO: 0.95 10*3/MM3 (ref 1–3)
LYMPHOCYTES NFR BLD AUTO: 18.3 % (ref 16–46)
MCH RBC QN AUTO: 26.2 PG (ref 27–33)
MCHC RBC AUTO-ENTMCNC: 30.5 G/DL (ref 33–37)
MCV RBC AUTO: 85.9 FL (ref 80–94)
MONOCYTES # BLD AUTO: 0.42 10*3/MM3 (ref 0.1–0.9)
MONOCYTES NFR BLD AUTO: 8.1 % (ref 0–12)
NEUTROPHILS # BLD AUTO: 3.23 10*3/MM3 (ref 1.4–6.5)
NEUTROPHILS NFR BLD AUTO: 62.4 % (ref 40–75)
OSMOLALITY SERPL CALC.SUM OF ELEC: 281.4 MOSM/KG (ref 273–305)
PLAT MORPH BLD: NORMAL
PLATELET # BLD AUTO: 200 10*3/MM3 (ref 130–400)
PMV BLD AUTO: 10.6 FL (ref 6–10)
POTASSIUM BLD-SCNC: 4 MMOL/L (ref 3.5–5.3)
PROTHROMBIN TIME: 25.4 SECONDS (ref 11–15.4)
RBC # BLD AUTO: 3.97 10*6/MM3 (ref 4.2–5.4)
SODIUM BLD-SCNC: 139 MMOL/L (ref 135–153)
WBC NRBC COR # BLD: 5.18 10*3/MM3 (ref 4.5–12.5)

## 2017-07-31 PROCEDURE — 80048 BASIC METABOLIC PNL TOTAL CA: CPT | Performed by: FAMILY MEDICINE

## 2017-07-31 PROCEDURE — 85025 COMPLETE CBC W/AUTO DIFF WBC: CPT | Performed by: FAMILY MEDICINE

## 2017-07-31 PROCEDURE — 85610 PROTHROMBIN TIME: CPT | Performed by: FAMILY MEDICINE

## 2017-07-31 PROCEDURE — 99232 SBSQ HOSP IP/OBS MODERATE 35: CPT | Performed by: INTERNAL MEDICINE

## 2017-07-31 PROCEDURE — 85007 BL SMEAR W/DIFF WBC COUNT: CPT | Performed by: FAMILY MEDICINE

## 2017-07-31 RX ORDER — FUROSEMIDE 40 MG/1
40 TABLET ORAL DAILY
Status: DISCONTINUED | OUTPATIENT
Start: 2017-07-31 | End: 2017-08-01

## 2017-07-31 RX ORDER — WARFARIN SODIUM 3 MG/1
6 TABLET ORAL
Status: COMPLETED | OUTPATIENT
Start: 2017-07-31 | End: 2017-07-31

## 2017-07-31 RX ADMIN — HYDRALAZINE HYDROCHLORIDE 25 MG: 50 TABLET ORAL at 12:07

## 2017-07-31 RX ADMIN — HYDROCODONE BITARTRATE AND ACETAMINOPHEN 1 TABLET: 7.5; 325 TABLET ORAL at 12:08

## 2017-07-31 RX ADMIN — FUROSEMIDE 40 MG: 40 TABLET ORAL at 12:07

## 2017-07-31 RX ADMIN — WARFARIN SODIUM 6 MG: 3 TABLET ORAL at 16:49

## 2017-07-31 RX ADMIN — ASPIRIN 81 MG: 81 TABLET ORAL at 08:11

## 2017-07-31 RX ADMIN — ALPRAZOLAM 0.5 MG: 0.5 TABLET ORAL at 20:46

## 2017-07-31 RX ADMIN — ISOSORBIDE MONONITRATE 60 MG: 60 TABLET, EXTENDED RELEASE ORAL at 08:11

## 2017-07-31 RX ADMIN — METOPROLOL TARTRATE 100 MG: 100 TABLET, FILM COATED ORAL at 08:11

## 2017-07-31 RX ADMIN — HYDRALAZINE HYDROCHLORIDE 25 MG: 50 TABLET ORAL at 05:38

## 2017-07-31 RX ADMIN — ATORVASTATIN CALCIUM 40 MG: 40 TABLET, FILM COATED ORAL at 20:46

## 2017-07-31 RX ADMIN — PANTOPRAZOLE SODIUM 40 MG: 40 TABLET, DELAYED RELEASE ORAL at 05:38

## 2017-07-31 RX ADMIN — CITALOPRAM HYDROBROMIDE 20 MG: 20 TABLET ORAL at 08:11

## 2017-07-31 RX ADMIN — ALPRAZOLAM 0.5 MG: 0.5 TABLET ORAL at 08:11

## 2017-07-31 RX ADMIN — CHOLECALCIFEROL CAP 125 MCG (5000 UNIT) 5000 UNITS: 125 CAP at 08:11

## 2017-08-01 ENCOUNTER — APPOINTMENT (OUTPATIENT)
Dept: GENERAL RADIOLOGY | Facility: HOSPITAL | Age: 82
End: 2017-08-01

## 2017-08-01 LAB
A-A DO2: 92.9 MMHG (ref 0–300)
ANION GAP SERPL CALCULATED.3IONS-SCNC: 7.3 MMOL/L (ref 3.6–11.2)
ARTERIAL PATENCY WRIST A: POSITIVE
ATMOSPHERIC PRESS: 729 MMHG
BASE EXCESS BLDA CALC-SCNC: 5.1 MMOL/L
BASOPHILS # BLD AUTO: 0.03 10*3/MM3 (ref 0–0.3)
BASOPHILS NFR BLD AUTO: 0.5 % (ref 0–2)
BDY SITE: ABNORMAL
BNP SERPL-MCNC: 1707 PG/ML (ref 0–100)
BODY TEMPERATURE: 98.6 C
BUN BLD-MCNC: 27 MG/DL (ref 7–21)
BUN/CREAT SERPL: 14.4 (ref 7–25)
CALCIUM SPEC-SCNC: 9.8 MG/DL (ref 7.7–10)
CHLORIDE SERPL-SCNC: 102 MMOL/L (ref 99–112)
CO2 SERPL-SCNC: 30.7 MMOL/L (ref 24.3–31.9)
COHGB MFR BLD: 1.2 % (ref 0–5)
CREAT BLD-MCNC: 1.87 MG/DL (ref 0.43–1.29)
DEPRECATED RDW RBC AUTO: 65.6 FL (ref 37–54)
EOSINOPHIL # BLD AUTO: 0.47 10*3/MM3 (ref 0–0.7)
EOSINOPHIL NFR BLD AUTO: 8.1 % (ref 0–7)
ERYTHROCYTE [DISTWIDTH] IN BLOOD BY AUTOMATED COUNT: 22 % (ref 11.5–14.5)
GFR SERPL CREATININE-BSD FRML MDRD: 26 ML/MIN/1.73
GLUCOSE BLD-MCNC: 106 MG/DL (ref 70–110)
HCO3 BLDA-SCNC: 30.7 MMOL/L (ref 22–26)
HCT VFR BLD AUTO: 38.3 % (ref 37–47)
HCT VFR BLD CALC: 36 % (ref 37–47)
HGB BLD-MCNC: 11.9 G/DL (ref 12–16)
HGB BLDA-MCNC: 12.3 G/DL (ref 12–16)
HOROWITZ INDEX BLD+IHG-RTO: 32 %
IMM GRANULOCYTES # BLD: 0 10*3/MM3 (ref 0–0.03)
IMM GRANULOCYTES NFR BLD: 0 % (ref 0–0.5)
INR PPP: 2.06 (ref 0.9–1.1)
LYMPHOCYTES # BLD AUTO: 1.15 10*3/MM3 (ref 1–3)
LYMPHOCYTES NFR BLD AUTO: 19.9 % (ref 16–46)
MCH RBC QN AUTO: 26.6 PG (ref 27–33)
MCHC RBC AUTO-ENTMCNC: 31.1 G/DL (ref 33–37)
MCV RBC AUTO: 85.7 FL (ref 80–94)
METHGB BLD QL: 0.3 % (ref 0–3)
MODALITY: ABNORMAL
MONOCYTES # BLD AUTO: 0.39 10*3/MM3 (ref 0.1–0.9)
MONOCYTES NFR BLD AUTO: 6.7 % (ref 0–12)
NEUTROPHILS # BLD AUTO: 3.74 10*3/MM3 (ref 1.4–6.5)
NEUTROPHILS NFR BLD AUTO: 64.8 % (ref 40–75)
OSMOLALITY SERPL CALC.SUM OF ELEC: 284.9 MOSM/KG (ref 273–305)
OXYHGB MFR BLDV: 91.8 % (ref 85–100)
PCO2 BLDA: 49.5 MM HG (ref 35–45)
PH BLDA: 7.41 PH UNITS (ref 7.35–7.45)
PLATELET # BLD AUTO: 195 10*3/MM3 (ref 130–400)
PMV BLD AUTO: 10.2 FL (ref 6–10)
PO2 BLDA: 67.4 MM HG (ref 80–100)
POTASSIUM BLD-SCNC: 3.8 MMOL/L (ref 3.5–5.3)
PROTHROMBIN TIME: 23.5 SECONDS (ref 11–15.4)
RBC # BLD AUTO: 4.47 10*6/MM3 (ref 4.2–5.4)
SAO2 % BLDCOA: 93.2 % (ref 90–100)
SODIUM BLD-SCNC: 140 MMOL/L (ref 135–153)
WBC NRBC COR # BLD: 5.78 10*3/MM3 (ref 4.5–12.5)

## 2017-08-01 PROCEDURE — 85610 PROTHROMBIN TIME: CPT | Performed by: FAMILY MEDICINE

## 2017-08-01 PROCEDURE — 99232 SBSQ HOSP IP/OBS MODERATE 35: CPT | Performed by: INTERNAL MEDICINE

## 2017-08-01 PROCEDURE — 94640 AIRWAY INHALATION TREATMENT: CPT

## 2017-08-01 PROCEDURE — 97163 PT EVAL HIGH COMPLEX 45 MIN: CPT | Performed by: PHYSICAL THERAPIST

## 2017-08-01 PROCEDURE — 80048 BASIC METABOLIC PNL TOTAL CA: CPT | Performed by: FAMILY MEDICINE

## 2017-08-01 PROCEDURE — 71010 HC CHEST PA OR AP: CPT

## 2017-08-01 PROCEDURE — G8978 MOBILITY CURRENT STATUS: HCPCS | Performed by: PHYSICAL THERAPIST

## 2017-08-01 PROCEDURE — 85025 COMPLETE CBC W/AUTO DIFF WBC: CPT | Performed by: FAMILY MEDICINE

## 2017-08-01 PROCEDURE — 36600 WITHDRAWAL OF ARTERIAL BLOOD: CPT | Performed by: FAMILY MEDICINE

## 2017-08-01 PROCEDURE — 83880 ASSAY OF NATRIURETIC PEPTIDE: CPT | Performed by: INTERNAL MEDICINE

## 2017-08-01 PROCEDURE — 82375 ASSAY CARBOXYHB QUANT: CPT | Performed by: FAMILY MEDICINE

## 2017-08-01 PROCEDURE — 71010 XR CHEST 1 VW: CPT | Performed by: RADIOLOGY

## 2017-08-01 PROCEDURE — 25010000002 FUROSEMIDE PER 20 MG: Performed by: FAMILY MEDICINE

## 2017-08-01 PROCEDURE — G8979 MOBILITY GOAL STATUS: HCPCS | Performed by: PHYSICAL THERAPIST

## 2017-08-01 PROCEDURE — 94799 UNLISTED PULMONARY SVC/PX: CPT

## 2017-08-01 PROCEDURE — 25010000002 FUROSEMIDE PER 20 MG: Performed by: INTERNAL MEDICINE

## 2017-08-01 PROCEDURE — 82805 BLOOD GASES W/O2 SATURATION: CPT | Performed by: FAMILY MEDICINE

## 2017-08-01 PROCEDURE — 83050 HGB METHEMOGLOBIN QUAN: CPT | Performed by: FAMILY MEDICINE

## 2017-08-01 RX ORDER — WARFARIN SODIUM 3 MG/1
6 TABLET ORAL
Status: COMPLETED | OUTPATIENT
Start: 2017-08-01 | End: 2017-08-01

## 2017-08-01 RX ORDER — LACTULOSE 10 G/15ML
30 SOLUTION ORAL ONCE
Status: COMPLETED | OUTPATIENT
Start: 2017-08-01 | End: 2017-08-01

## 2017-08-01 RX ORDER — FUROSEMIDE 10 MG/ML
40 INJECTION INTRAMUSCULAR; INTRAVENOUS ONCE
Status: COMPLETED | OUTPATIENT
Start: 2017-08-01 | End: 2017-08-01

## 2017-08-01 RX ORDER — DOCUSATE SODIUM 100 MG/1
100 CAPSULE, LIQUID FILLED ORAL 2 TIMES DAILY
Status: DISCONTINUED | OUTPATIENT
Start: 2017-08-01 | End: 2017-08-05 | Stop reason: HOSPADM

## 2017-08-01 RX ORDER — FUROSEMIDE 10 MG/ML
40 INJECTION INTRAMUSCULAR; INTRAVENOUS DAILY
Status: DISCONTINUED | OUTPATIENT
Start: 2017-08-01 | End: 2017-08-02

## 2017-08-01 RX ORDER — METOPROLOL SUCCINATE 50 MG/1
50 TABLET, EXTENDED RELEASE ORAL EVERY 12 HOURS SCHEDULED
Status: DISCONTINUED | OUTPATIENT
Start: 2017-08-01 | End: 2017-08-05 | Stop reason: HOSPADM

## 2017-08-01 RX ORDER — ALBUTEROL SULFATE 2.5 MG/3ML
2.5 SOLUTION RESPIRATORY (INHALATION)
Status: DISCONTINUED | OUTPATIENT
Start: 2017-08-01 | End: 2017-08-05 | Stop reason: HOSPADM

## 2017-08-01 RX ADMIN — ALBUTEROL SULFATE 2.5 MG: 2.5 SOLUTION RESPIRATORY (INHALATION) at 18:41

## 2017-08-01 RX ADMIN — PANTOPRAZOLE SODIUM 40 MG: 40 TABLET, DELAYED RELEASE ORAL at 05:36

## 2017-08-01 RX ADMIN — HYDRALAZINE HYDROCHLORIDE 25 MG: 50 TABLET ORAL at 20:47

## 2017-08-01 RX ADMIN — CHOLECALCIFEROL CAP 125 MCG (5000 UNIT) 5000 UNITS: 125 CAP at 09:22

## 2017-08-01 RX ADMIN — ISOSORBIDE MONONITRATE 60 MG: 60 TABLET, EXTENDED RELEASE ORAL at 09:22

## 2017-08-01 RX ADMIN — LACTULOSE 30 G: 20 SOLUTION ORAL at 13:37

## 2017-08-01 RX ADMIN — ALPRAZOLAM 0.5 MG: 0.5 TABLET ORAL at 09:22

## 2017-08-01 RX ADMIN — METOPROLOL SUCCINATE 50 MG: 50 TABLET, FILM COATED, EXTENDED RELEASE ORAL at 20:49

## 2017-08-01 RX ADMIN — ALPRAZOLAM 0.5 MG: 0.5 TABLET ORAL at 20:47

## 2017-08-01 RX ADMIN — FUROSEMIDE 40 MG: 10 INJECTION, SOLUTION INTRAMUSCULAR; INTRAVENOUS at 09:25

## 2017-08-01 RX ADMIN — METOPROLOL TARTRATE 100 MG: 100 TABLET, FILM COATED ORAL at 09:22

## 2017-08-01 RX ADMIN — FUROSEMIDE 40 MG: 10 INJECTION, SOLUTION INTRAMUSCULAR; INTRAVENOUS at 17:57

## 2017-08-01 RX ADMIN — CITALOPRAM HYDROBROMIDE 20 MG: 20 TABLET ORAL at 09:22

## 2017-08-01 RX ADMIN — ASPIRIN 81 MG: 81 TABLET ORAL at 09:22

## 2017-08-01 RX ADMIN — ALBUTEROL SULFATE 2.5 MG: 2.5 SOLUTION RESPIRATORY (INHALATION) at 07:57

## 2017-08-01 RX ADMIN — HYDRALAZINE HYDROCHLORIDE 25 MG: 50 TABLET ORAL at 13:37

## 2017-08-01 RX ADMIN — ATORVASTATIN CALCIUM 40 MG: 40 TABLET, FILM COATED ORAL at 20:47

## 2017-08-01 RX ADMIN — DOCUSATE SODIUM 100 MG: 100 CAPSULE, LIQUID FILLED ORAL at 17:57

## 2017-08-01 RX ADMIN — DOCUSATE SODIUM 100 MG: 100 CAPSULE, LIQUID FILLED ORAL at 13:37

## 2017-08-01 RX ADMIN — ALBUTEROL SULFATE 2.5 MG: 2.5 SOLUTION RESPIRATORY (INHALATION) at 12:31

## 2017-08-01 RX ADMIN — HYDRALAZINE HYDROCHLORIDE 25 MG: 50 TABLET ORAL at 05:36

## 2017-08-01 RX ADMIN — WARFARIN SODIUM 6 MG: 3 TABLET ORAL at 17:57

## 2017-08-01 NOTE — PROGRESS NOTES
LOS: 4 days     Name: Tre Kinney  Age/Sex: 84 y.o. female  :  1932        PCP: Myles Barone MD    Active Problems:    Right heart failure    Heart failure, chronic, right-sided      Admission Information: Tre Kinney is a 84 y.o. female with a past medical history significant for Dr. Bernal in [place] with a history of hypertension, chronic kidney disease, coronary artery disease status post stent placement, symptomatic bradycardia status post pacemaker placement, dyslipidemia and previous history of DVT who comes to the hospital for progressive shortness of breath.  According to the patient she has a long history of lower extremity edema, volume overload and shortness of breath.  She states the current episode started approximately one week ago with worsening shortness of breath.  She reports her shortness of breath progressively worsening.  She denies any ameliorating or exacerbating factors.  She states the intensity was severe.  Due to the progressive nature of her symptoms and the fact that she has been hospitalized multiple times in the recent past she decided to come to the hospital for further evaluation.  She was admitted to the medicine service for further evaluation and cardiology was consulted.    Following for: Congestive heart failure    Telemetry Monitoring: Paced 70s-80s.    Interval history: She had some shortness of breath this morning when she got up to go to the restroom. She received IV lasix with improvement in SOA.     Review of Systems   Cardiovascular: Positive for leg swelling. Negative for chest pain.   Respiratory: Positive for shortness of breath. Negative for cough.      Vital Signs  Vital Signs (last 72 hrs)        07  -   0659 700  -   0659  07  -   0659 700  -   1459   Most Recent    Temp (°F) 97.5 -  97.7    97.1 -  98.1    97.6 -  98.1      97.5     97.5 (36.4)    Heart Rate 72 -  83    79 -  81    78 -  82    79  -  83     80    Resp 18 -  20    18 -  22    18 -  22    20 -  22     20    /75 -  146/74    131/63 -  146/76    118/68 -  150/83      143/90     143/90    SpO2 (%) 93 -  96    92 -  98    91 -  95    90 -  95     95        Temp:  [97.5 °F (36.4 °C)-98.1 °F (36.7 °C)] 97.5 °F (36.4 °C)  Heart Rate:  [79-83] 80  Resp:  [18-22] 20  BP: (118-150)/(68-90) 143/90  Body mass index is 30.46 kg/(m^2).      Intake/Output Summary (Last 24 hours) at 08/01/17 1503  Last data filed at 08/01/17 0758   Gross per 24 hour   Intake              240 ml   Output             2575 ml   Net            -2335 ml     Physical Exam   Constitutional: She is oriented to person, place, and time. She appears well-developed and well-nourished. No distress.   HENT:   Head: Normocephalic and atraumatic.   Eyes: Conjunctivae are normal. Right eye exhibits no discharge. Left eye exhibits no discharge.   Neck: Normal range of motion. Neck supple. Carotid bruit is not present.   Cardiovascular: Normal rate, regular rhythm and normal heart sounds.  Exam reveals no gallop and no friction rub.    No murmur heard.  Pulmonary/Chest: Effort normal. No respiratory distress. She has no wheezes. She has no rales. She exhibits no tenderness.   Decreased air movement bilateral bases. Upper lung fields are clear.   Musculoskeletal: Normal range of motion. She exhibits edema (2+ edema bilateral lower extremities.).   Neurological: She is alert and oriented to person, place, and time.   Skin: Skin is warm and dry. No rash noted. She is not diaphoretic. No erythema. No pallor.   Psychiatric: She has a normal mood and affect. Her behavior is normal.   Nursing note and vitals reviewed.    Results Review:       Results from last 7 days  Lab Units 08/01/17  0535 07/31/17  0124 07/30/17  0857 07/27/17  1726   WBC 10*3/mm3 5.78 5.18 4.42* 6.04   HEMOGLOBIN g/dL 11.9* 10.4* 10.8* 11.4*   PLATELETS 10*3/mm3 195 200 183 212       Results from last 7 days  Lab Units  08/01/17  0535 07/31/17  0245 07/30/17  0857 07/29/17  1155 07/27/17  1726   SODIUM mmol/L 140 139 140 140 137   POTASSIUM mmol/L 3.8 4.0 3.8 3.9 3.8   CHLORIDE mmol/L 102 107 104 107 105   CO2 mmol/L 30.7 27.8 30.8 27.7 22.1*   BUN mg/dL 27* 24* 29* 28* 22*   CREATININE mg/dL 1.87* 1.83* 1.81* 1.83* 1.72*   CALCIUM mg/dL 9.8 9.1 8.8 8.7 9.0   GLUCOSE mg/dL 106 95 91 101 99       Results from last 7 days  Lab Units 08/01/17  0535 07/31/17  0125 07/30/17  0100 07/29/17  0130 07/28/17  0129 07/27/17  1920   INR  2.06* 2.27* 2.72* 3.25* 3.93* 4.39*     Transthoracic Echocardiogram 07/27/17  · The study is technically adequate for diagnosis.  · Estimated EF appears to be in the range of 36 - 40%.  · Right ventricular cavity is mildly dilated.  · Left atrial cavity size is moderate-to-severely dilated.  · Right atrial cavity size is severely dilated.  · Moderate mitral valve regurgitation is present  · Moderate to severe tricuspid valve regurgitation is present.  · Mild pulmonic valve regurgitation is present.  · Electronic lead present in the right ventricle.  · There is no evidence of pericardial effusion.  · Moderate pulmonary hypertension is present.      Compared to the study of 12/25/2016, The EF is now mildly to moderately reduced. The atria are now dilated. There is moderate mitral and moderate to severe tricuspid regurgitation. There is moderate pulmonary hypertension.    I reviewed the patient's new clinical results.  I reviewed the patient's new imaging results and agree with the interpretation.  I personally viewed and interpreted the patient's EKG/Telemetry data     Medication Review:     albuterol 2.5 mg Nebulization Q6H - RT   ALPRAZolam 0.5 mg Oral BID   aspirin 81 mg Oral Daily   atorvastatin 40 mg Oral Nightly   citalopram 20 mg Oral Daily   docusate sodium 100 mg Oral BID   furosemide 40 mg Intravenous Daily   furosemide 40 mg Intravenous Once   hydrALAZINE 25 mg Oral Q8H   isosorbide mononitrate 60 mg  Oral Daily   metoprolol tartrate 100 mg Oral Q12H   pantoprazole 40 mg Oral Q AM   vitamin D3 5,000 Units Oral Daily   warfarin 6 mg Oral Once       Pharmacy to dose warfarin      Assessment:  1. Acute systolic heart failure  2. Ischemic cardiomyopathy with EF 36-40%  3. History of coronary artery disease: complete details unavailable.   4. History of paroxsymal atrial fibrillation, paced rhythm, on warfarin for stroke prevention with therapeutic INR at this time.   5. Moderate to severe tricuspid regurgitation  6. Moderate mitral regurgitation  7. Sick sinus syndrome s/p permanent dual chamber pacemaker implantation.  8. Chronic kidney disease   9. Hypertension: Borderline elevated BP.   10. Dyslipidemia     Recommendations:  1. No ACE/ARB/entresto second to chronic kidney disease.   2. Continue cautious diuresis and monitor renal function closely.   3. Change metoprolol tartrate to metoprolol succinate 50mg BID for better efficacy with her cardiomyopathy. Continue hydralazine and imdur, will advance as tolerated.   4. BMP/BNP in AM.     I discussed the patients findings and my recommendations with patient and family      MIKAL Cutler, acting as scribe for Dr. Kiran Yeh MD, [facility].  08/01/17  2:59 PM    Addendum:   I have seen and evaluated Ms. Kinney and did the history and physical and formulated the impression and recommendation plan and discussed the case with Keerthi Barba who has scribed this note for me.    Kiran Yeh MD, [facility]  08/01/17  3:03 PM

## 2017-08-01 NOTE — CONSULTS
Nephrology Consult Note    Referring Provider: Dr Barone   Reason for Consultation: CKD 3    Subjective       History of present illness:  Tre Kinney is a 84 y.o. female who presented to [facility] emergency department with chief complaint of shortness of breath on exertion associated with leg edema. She was admitted 4 days ago. She has been diuresed with lasix IV which was changed to by mouth yesterday. Today she describes shortness of breath on minimal activities and she is dyspneic even on talking. She has 2 pillow orthopnea but it is chronic. She had chest pian yesterday and is chest pain free this morning. She also complains of constipation and has not had BM for 4 days. She has no fever or chills. She denies nausea or vomiting, she has been eating well. She denies eating high salt diet at home. 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 Patient denies hematuria, dysuria, difficulty passing urine. She has bilateral knee pain from arthritis but denies skin rash.       History  Past Medical History:   Diagnosis Date   • Arthritis    • CHF (congestive heart failure)    • Coronary artery disease    • H/O blood clots    • History of transfusion     no reaction    • Hypertension    , Past Surgical History:   Procedure Laterality Date   • ABDOMINAL SURGERY     • APPENDECTOMY     • CARDIAC SURGERY     •  SECTION     • CORONARY ANGIOPLASTY WITH STENT PLACEMENT     • PACEMAKER IMPLANTATION      left side    • SKIN BIOPSY     • TUBAL ABDOMINAL LIGATION     , Family History   Problem Relation Age of Onset   • Other Mother      cardiac disorder   • Hypertension Mother    • Heart disease Mother    • Thyroid disease Mother    • Other Father      cardiac disorder   • Heart disease Father    • Hypertension Father    • Other Sister      cardiac disorder   • Other Brother      cardiac  disorder   • Other Other      cardiac disorder   • Other Other      cardiac disorder   , Social History   Substance Use Topics   • Smoking status: Never Smoker   • Smokeless tobacco: Not on file   • Alcohol use No   , Prescriptions Prior to Admission   Medication Sig Dispense Refill Last Dose   • ALPRAZolam (XANAX) 1 MG tablet Take 0.5 mg by mouth 2 (Two) Times a Day.   7/27/2017 at am   • amLODIPine (NORVASC) 10 MG tablet Take 10 mg by mouth Daily.   7/27/2017 at 0800   • Cholecalciferol (VITAMIN D3) 5000 UNITS capsule capsule Take 1 capsule by mouth Daily. 30 capsule 5 7/27/2017 at 0800   • citalopram (CELEXA) 20 MG tablet Take 20 mg by mouth Daily.   7/27/2017 at 0800   • furosemide (LASIX) 20 MG tablet Take 20 mg by mouth 2 (Two) Times a Day.   7/27/2017 at 0800   • HYDROcodone-acetaminophen (NORCO) 7.5-325 MG per tablet Take 1 tablet by mouth 2 (Two) Times a Day.   7/26/2017 at 2100   • isosorbide mononitrate (IMDUR) 60 MG 24 hr tablet Take 60 mg by mouth Daily.   7/27/2017 at 0800   • metoprolol tartrate (LOPRESSOR) 100 MG tablet Take 100 mg by mouth 2 (Two) Times a Day.   7/27/2017 at 0800   • pantoprazole (PROTONIX) 40 MG EC tablet Take 40 mg by mouth Daily.   7/27/2017 at 0800   • warfarin (COUMADIN) 6 MG tablet Take 6 mg by mouth Daily.   7/27/2017 at 0800   , Scheduled Meds:    albuterol 2.5 mg Nebulization Q6H - RT   ALPRAZolam 0.5 mg Oral BID   aspirin 81 mg Oral Daily   atorvastatin 40 mg Oral Nightly   citalopram 20 mg Oral Daily   furosemide 40 mg Intravenous Daily   hydrALAZINE 25 mg Oral Q8H   isosorbide mononitrate 60 mg Oral Daily   metoprolol tartrate 100 mg Oral Q12H   pantoprazole 40 mg Oral Q AM   vitamin D3 5,000 Units Oral Daily   , Continuous Infusions:    Pharmacy to dose warfarin    , PRN Meds:  aluminum-magnesium hydroxide-simethicone  •  HYDROcodone-acetaminophen  •  Pharmacy to dose warfarin  •  sodium chloride and Allergies:  Latex    Review of Systems  More than 10 point review if  systems was done. Pertinent items are noted in HPI, all other systems reviewed and negative    Objective     Vital Signs  Temp:  [97.6 °F (36.4 °C)-98.1 °F (36.7 °C)] 97.6 °F (36.4 °C)  Heart Rate:  [78-82] 79  Resp:  [18-22] 22  BP: (118-150)/(68-83) 150/83    I/O this shift:  In: -   Out: 400 [Urine:400]  I/O last 3 completed shifts:  In: 480 [P.O.:480]  Out: 3775 [Urine:3775]    Physical Examination:    General Appearance : alert, appears stated age, cooperative and mild respiratory distress  Head : normocephalic, without obvious abnormality and atraumatic  Eyes : conjunctivae and sclerae normal, no icterus, no pallor and PERRLA  Throat : oral mucosa moist  Neck: no adenopathy, suppple, no carotid bruit and + JVD  Lungs : bibasilar crackles  Heart : regular rhythm & normal rate, normal S1, S2, no murmur, no david, no rub   Abdomen :  normal bowel sounds, no masses and soft non-tender  Rectal : Deferred  Extremities : moves extremities well, no redness and 1+ edema  Pulses :  palpable and equal bilaterally  Skin : no bleeding, bruising or rash  Neurologic : orientated to person, place, time, grossly no focal deficitis    Laboratory Data :      WBC WBC   Date Value Ref Range Status   08/01/2017 5.78 4.50 - 12.50 10*3/mm3 Final   07/31/2017 5.18 4.50 - 12.50 10*3/mm3 Final   07/30/2017 4.42 (L) 4.50 - 12.50 10*3/mm3 Final      HGB Hemoglobin   Date Value Ref Range Status   08/01/2017 11.9 (L) 12.0 - 16.0 g/dL Final   07/31/2017 10.4 (L) 12.0 - 16.0 g/dL Final   07/30/2017 10.8 (L) 12.0 - 16.0 g/dL Final      HCT Hematocrit   Date Value Ref Range Status   08/01/2017 38.3 37.0 - 47.0 % Final   07/31/2017 34.1 (L) 37.0 - 47.0 % Final   07/30/2017 34.3 (L) 37.0 - 47.0 % Final      Platlets No results found for: LABPLAT   MCV MCV   Date Value Ref Range Status   08/01/2017 85.7 80.0 - 94.0 fL Final   07/31/2017 85.9 80.0 - 94.0 fL Final   07/30/2017 83.7 80.0 - 94.0 fL Final          Sodium Sodium   Date Value Ref Range  Status   08/01/2017 140 135 - 153 mmol/L Final   07/31/2017 139 135 - 153 mmol/L Final   07/30/2017 140 135 - 153 mmol/L Final   07/29/2017 140 135 - 153 mmol/L Final      Potassium Potassium   Date Value Ref Range Status   08/01/2017 3.8 3.5 - 5.3 mmol/L Final   07/31/2017 4.0 3.5 - 5.3 mmol/L Final     Comment:     1+ Hemolysis    07/30/2017 3.8 3.5 - 5.3 mmol/L Final   07/29/2017 3.9 3.5 - 5.3 mmol/L Final      Chloride Chloride   Date Value Ref Range Status   08/01/2017 102 99 - 112 mmol/L Final   07/31/2017 107 99 - 112 mmol/L Final   07/30/2017 104 99 - 112 mmol/L Final   07/29/2017 107 99 - 112 mmol/L Final      CO2 CO2   Date Value Ref Range Status   08/01/2017 30.7 24.3 - 31.9 mmol/L Final   07/31/2017 27.8 24.3 - 31.9 mmol/L Final   07/30/2017 30.8 24.3 - 31.9 mmol/L Final   07/29/2017 27.7 24.3 - 31.9 mmol/L Final      BUN BUN   Date Value Ref Range Status   08/01/2017 27 (H) 7 - 21 mg/dL Final   07/31/2017 24 (H) 7 - 21 mg/dL Final   07/30/2017 29 (H) 7 - 21 mg/dL Final   07/29/2017 28 (H) 7 - 21 mg/dL Final      Creatinine Creatinine   Date Value Ref Range Status   08/01/2017 1.87 (H) 0.43 - 1.29 mg/dL Final   07/31/2017 1.83 (H) 0.43 - 1.29 mg/dL Final   07/30/2017 1.81 (H) 0.43 - 1.29 mg/dL Final   07/29/2017 1.83 (H) 0.43 - 1.29 mg/dL Final      Calcium Calcium   Date Value Ref Range Status   08/01/2017 9.8 7.7 - 10.0 mg/dL Final   07/31/2017 9.1 7.7 - 10.0 mg/dL Final   07/30/2017 8.8 7.7 - 10.0 mg/dL Final   07/29/2017 8.7 7.7 - 10.0 mg/dL Final      PO4 No results found for: CAPO4   Albumin Albumin   Date Value Ref Range Status   07/29/2017 3.50 3.40 - 4.80 g/dL Final      Magnesium No results found for: MG   Uric Acid No results found for: URICACID     Radiology results :     Imaging Results (last 72 hours)     ** No results found for the last 72 hours. **            Medications:        albuterol 2.5 mg Nebulization Q6H - RT   ALPRAZolam 0.5 mg Oral BID   aspirin 81 mg Oral Daily   atorvastatin  40 mg Oral Nightly   citalopram 20 mg Oral Daily   furosemide 40 mg Intravenous Daily   hydrALAZINE 25 mg Oral Q8H   isosorbide mononitrate 60 mg Oral Daily   metoprolol tartrate 100 mg Oral Q12H   pantoprazole 40 mg Oral Q AM   vitamin D3 5,000 Units Oral Daily       Pharmacy to dose warfarin        Assessment/Plan     Active Problems:    Right heart failure    Heart failure, chronic, right-sided      1. 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 Renal function is stable. Avoid nephrotoxins. I will give extra lasix 40 mg iv x 1 today. She is already on lasix 40 mg iv daily. Will monitor renal function closely    2. Systolic CHF exacerbation : diuresis as stated above. Check CXR today. I will change diet to low sodium. Cardiology on case    3. HTN : stable    4. Constipation : will give lactulose, add colace as well    Thanks Dr Barone for the consult. We will follow with you.  I discussed the patients findings and my recommendations with patient and nursing staff    Sebas Ellis MD  08/01/17  9:42 AM

## 2017-08-01 NOTE — PLAN OF CARE
Problem: Fluid Volume Excess (Adult,Obstetrics,Pediatric)  Goal: Identify Related Risk Factors and Signs and Symptoms  Outcome: Ongoing (interventions implemented as appropriate)  Goal: Stable Weight  Outcome: Ongoing (interventions implemented as appropriate)  Goal: Balanced Intake/Output  Outcome: Ongoing (interventions implemented as appropriate)    Problem: Fall Risk (Adult)  Goal: Identify Related Risk Factors and Signs and Symptoms  Outcome: Ongoing (interventions implemented as appropriate)  Goal: Absence of Falls  Outcome: Ongoing (interventions implemented as appropriate)    Problem: Skin Integrity Impairment, Risk/Actual (Adult)  Goal: Identify Related Risk Factors and Signs and Symptoms  Outcome: Ongoing (interventions implemented as appropriate)  Goal: Skin Integrity/Wound Healing  Outcome: Ongoing (interventions implemented as appropriate)    Problem: Patient Care Overview (Adult)  Goal: Plan of Care Review  Outcome: Ongoing (interventions implemented as appropriate)  Goal: Adult Individualization and Mutuality  Outcome: Ongoing (interventions implemented as appropriate)  Goal: Discharge Needs Assessment  Outcome: Ongoing (interventions implemented as appropriate)

## 2017-08-01 NOTE — PLAN OF CARE
Problem: Inpatient Physical Therapy  Goal: Bed Mobility Goal LTG- PT  Outcome: Ongoing (interventions implemented as appropriate)    08/01/17 1453   Bed Mobility PT LTG   Bed Mobility PT LTG, Date Established 08/01/17   Bed Mobility PT LTG, Time to Achieve by discharge   Bed Mobility PT LTG, Activity Type supine to sit/sit to supine   Bed Mobility PT LTG, [place] Level contact guard assist;verbal cues required   Bed Mobility PT Goal LTG, Assist Device bed rails       Goal: Transfer Training Goal 1 LTG- PT  Outcome: Ongoing (interventions implemented as appropriate)    08/01/17 1453   Transfer Training PT LTG   Transfer Training PT LTG, Date Established 08/01/17   Transfer Training PT LTG, Time to Achieve by discharge   Transfer Training PT LTG, Activity Type bed to chair /chair to bed;sit to stand/stand to sit   Transfer Training PT LTG, [place] Level minimum assist (75% patient effort)   Transfer Training PT LTG, Assist Device walker, rolling       Goal: Gait Training Goal LTG- PT  Outcome: Ongoing (interventions implemented as appropriate)    08/01/17 1453   Gait Training PT LTG   Gait Training Goal PT LTG, Date Established 08/01/17   Gait Training Goal PT LTG, Time to Achieve by discharge   Gait Training Goal PT LTG, [place] Level minimum assist (75% patient effort)   Gait Training Goal PT LTG, Assist Device walker, rolling   Gait Training Goal PT LTG, Distance to Achieve 150

## 2017-08-01 NOTE — PROGRESS NOTES
Tre Kinney 84 y.o.   08/01/17    Subjective: Patient dyspneic this morning it getting up use the bathroom.  Objective: Vital signs stable difficulty with speech due to shortness of breath lungs with bilateral rhonchi but bilateral air sounds are present  Vital Signs (last 72 hrs)       07/28 0700  -  07/29 0659 07/29 0700  -  07/30 0659 07/30 0700  -  07/31 0659 07/31 0700  -  08/01 0638   Most Recent    Temp (°F) 97.6 -  97.7    97.5 -  97.7    97.1 -  98.1    97.6 -  98.1     97.6 (36.4)    Heart Rate 77 -  88    72 -  83    79 -  81    78 -  82     79    Resp 18 -  20    18 -  20    18 -  22    18 -  22     22    /73 -  135/81    131/75 -  146/74    131/63 -  146/76    118/68 -  150/83     150/83    SpO2 (%) 95 -  98    93 -  96    92 -  98    91 -  95     91        Lab Results (last 72 hours)     Procedure Component Value Units Date/Time    Osmolality, Calculated [[id number]]  (Normal) Collected:  07/29/17 1155    Specimen:  Blood Updated:  07/29/17 1236     Osmolality Calc 285.0 mOsm/kg     Comprehensive Metabolic Panel [[id number]]  (Abnormal) Collected:  07/29/17 1155    Specimen:  Blood Updated:  07/29/17 1236     Glucose 101 mg/dL      BUN 28 (H) mg/dL      Creatinine 1.83 (H) mg/dL      Sodium 140 mmol/L      Potassium 3.9 mmol/L      Chloride 107 mmol/L      CO2 27.7 mmol/L      Calcium 8.7 mg/dL      Total Protein 6.0 g/dL      Albumin 3.50 g/dL      ALT (SGPT) 12 U/L      AST (SGOT) 15 U/L      Alkaline Phosphatase 45 U/L       Note New Reference Ranges        Total Bilirubin 0.7 mg/dL      eGFR Non African Amer 26 (L) mL/min/1.73      Globulin 2.5 gm/dL      A/G Ratio 1.4 (L) g/dL      BUN/Creatinine Ratio 15.3     Anion Gap 5.3 mmol/L     Narrative:       The MDRD GFR formula is only valid for adults with stable renal function between ages 18 and 70.    BNP [[id number]]  (Abnormal) Collected:  07/29/17 1155    Specimen:  Blood Updated:  07/29/17 1245     BNP 1632.0 (H) pg/mL     Lipid Panel  [[id number]]  (Abnormal) Collected:  07/30/17 0100    Specimen:  Blood Updated:  07/30/17 0138     Total Cholesterol 140 mg/dL      Triglycerides 77 mg/dL      HDL Cholesterol 40 (L) mg/dL      LDL Cholesterol  85 mg/dL      VLDL Cholesterol 15.4 mg/dL      LDL/HDL Ratio 2.12    Narrative:       Cholesterol Reference Ranges  (U.S. Department of Health and Human Services ATP III Classifications)    Desirable          <200 mg/dL  Borderline High    200-239 mg/dL  High Risk          >240 mg/dL      Triglyceride Reference Ranges  (U.S. Department of Health and Human Services ATP III Classifications)    Normal           <150 mg/dL  Borderline High  150-199 mg/dL  High             200-499 mg/dL  Very High        >500 mg/dL    HDL Reference Ranges  (U.S. Department of Health and Human Services ATP III Classifcations)    Low     <40 mg/dl (major risk factor for CHD)  High    >60 mg/dl ('negative' risk factor for CHD)        LDL Reference Ranges  (U.S. Department of Health and Human Services ATP III Classifcations)    Optimal          <100 mg/dL  Near Optimal     100-129 mg/dL  Borderline High  130-159 mg/dL  High             160-189 mg/dL  Very High        >189 mg/dL    Protime-INR [[id number]]  (Abnormal) Collected:  07/30/17 0100    Specimen:  Blood Updated:  07/30/17 0142     Protime 29.2 (H) Seconds       Note new Reference Range        INR 2.72 (H)    Narrative:       Suggested INR therapeutic range for stable oral anticoagulant therapy:    Low Intensity therapy:   1.5-2.0  Moderate Intensity therapy:   2.0-3.0  High Intensity therapy:   2.5-4.0    CBC & Differential [[id number]] Collected:  07/30/17 0857    Specimen:  Blood Updated:  07/30/17 0909    Narrative:       The following orders were created for panel order CBC & Differential.  Procedure                               Abnormality         Status                     ---------                               -----------         ------                     CBC Auto  Differential[[id number]]        Abnormal            Final result                 Please view results for these tests on the individual orders.    CBC Auto Differential [[id number]]  (Abnormal) Collected:  07/30/17 0857    Specimen:  Blood Updated:  07/30/17 0909     WBC 4.42 (L) 10*3/mm3      RBC 4.10 (L) 10*6/mm3      Hemoglobin 10.8 (L) g/dL      Hematocrit 34.3 (L) %      MCV 83.7 fL      MCH 26.3 (L) pg      MCHC 31.5 (L) g/dL      RDW 21.9 (H) %      RDW-SD 66.3 (H) fl      MPV 10.0 fL      Platelets 183 10*3/mm3      Neutrophil % 59.9 %      Lymphocyte % 20.4 %      Monocyte % 7.9 %      Eosinophil % 10.9 (H) %      Basophil % 0.7 %      Immature Grans % 0.2 %      Neutrophils, Absolute 2.65 10*3/mm3      Lymphocytes, Absolute 0.90 (L) 10*3/mm3      Monocytes, Absolute 0.35 10*3/mm3      Eosinophils, Absolute 0.48 10*3/mm3      Basophils, Absolute 0.03 10*3/mm3      Immature Grans, Absolute 0.01 10*3/mm3     Basic Metabolic Panel [[id number]]  (Abnormal) Collected:  07/30/17 0857    Specimen:  Blood Updated:  07/30/17 0929     Glucose 91 mg/dL      BUN 29 (H) mg/dL      Creatinine 1.81 (H) mg/dL      Sodium 140 mmol/L      Potassium 3.8 mmol/L      Chloride 104 mmol/L      CO2 30.8 mmol/L      Calcium 8.8 mg/dL      eGFR Non African Amer 27 (L) mL/min/1.73      BUN/Creatinine Ratio 16.0     Anion Gap 5.2 mmol/L     Narrative:       The MDRD GFR formula is only valid for adults with stable renal function between ages 18 and 70.    Osmolality, Calculated [[id number]]  (Normal) Collected:  07/30/17 0857    Specimen:  Blood Updated:  07/30/17 0929     Osmolality Calc 284.8 mOsm/kg     Protime-INR [[id number]]  (Abnormal) Collected:  07/31/17 0125    Specimen:  Blood Updated:  07/31/17 0145     Protime 25.4 (H) Seconds       Note new Reference Range        INR 2.27 (H)    Narrative:       Suggested INR therapeutic range for stable oral anticoagulant therapy:    Low Intensity therapy:   1.5-2.0  Moderate Intensity  therapy:   2.0-3.0  High Intensity therapy:   2.5-4.0    CBC & Differential [[id number]] Collected:  07/31/17 0124    Specimen:  Blood Updated:  07/31/17 0226    Narrative:       The following orders were created for panel order CBC & Differential.  Procedure                               Abnormality         Status                     ---------                               -----------         ------                     Scan Slide[[id number]]                                       Final result               CBC Auto Differential[[id number]]        Abnormal            Final result                 Please view results for these tests on the individual orders.    CBC Auto Differential [[id number]]  (Abnormal) Collected:  07/31/17 0124    Specimen:  Blood Updated:  07/31/17 0226     WBC 5.18 10*3/mm3      RBC 3.97 (L) 10*6/mm3      Hemoglobin 10.4 (L) g/dL      Hematocrit 34.1 (L) %      MCV 85.9 fL      MCH 26.2 (L) pg      MCHC 30.5 (L) g/dL      RDW 22.2 (H) %      RDW-SD 66.4 (H) fl      MPV 10.6 (H) fL      Platelets 200 10*3/mm3      Neutrophil % 62.4 %      Lymphocyte % 18.3 %      Monocyte % 8.1 %      Eosinophil % 10.0 (H) %      Basophil % 1.0 %      Immature Grans % 0.2 %      Neutrophils, Absolute 3.23 10*3/mm3      Lymphocytes, Absolute 0.95 (L) 10*3/mm3      Monocytes, Absolute 0.42 10*3/mm3      Eosinophils, Absolute 0.52 10*3/mm3      Basophils, Absolute 0.05 10*3/mm3      Immature Grans, Absolute 0.01 10*3/mm3     Scan Slide [[id number]] Collected:  07/31/17 0124    Specimen:  Blood Updated:  07/31/17 0226     Anisocytosis Large/3+     Hypochromia Slight/1+     Platelet Morphology Normal    Basic Metabolic Panel [[id number]]  (Abnormal) Collected:  07/31/17 0245    Specimen:  Blood Updated:  07/31/17 0400     Glucose 95 mg/dL      BUN 24 (H) mg/dL      Creatinine 1.83 (H) mg/dL      Sodium 139 mmol/L      Potassium 4.0 mmol/L       1+ Hemolysis         Chloride 107 mmol/L      CO2 27.8 mmol/L      Calcium 9.1  mg/dL      eGFR Non African Amer 26 (L) mL/min/1.73      BUN/Creatinine Ratio 13.1     Anion Gap 4.2 mmol/L     Narrative:       The MDRD GFR formula is only valid for adults with stable renal function between ages 18 and 70.    Osmolality, Calculated [[id number]]  (Normal) Collected:  07/31/17 0245    Specimen:  Blood Updated:  07/31/17 0400     Osmolality Calc 281.4 mOsm/kg     Protime-INR [[id number]]  (Abnormal) Collected:  08/01/17 0535    Specimen:  Blood Updated:  08/01/17 0603     Protime 23.5 (H) Seconds       Note new Reference Range        INR 2.06 (H)    Narrative:       Suggested INR therapeutic range for stable oral anticoagulant therapy:    Low Intensity therapy:   1.5-2.0  Moderate Intensity therapy:   2.0-3.0  High Intensity therapy:   2.5-4.0    BNP [[id number]] Collected:  08/01/17 0535    Specimen:  Blood Updated:  08/01/17 0613    CBC & Differential [[id number]] Collected:  08/01/17 0535    Specimen:  Blood Updated:  08/01/17 0616    Narrative:       The following orders were created for panel order CBC & Differential.  Procedure                               Abnormality         Status                     ---------                               -----------         ------                     CBC Auto Differential[[id number]]        Abnormal            Final result                 Please view results for these tests on the individual orders.    CBC Auto Differential [[id number]]  (Abnormal) Collected:  08/01/17 0535    Specimen:  Blood Updated:  08/01/17 0616     WBC 5.78 10*3/mm3      RBC 4.47 10*6/mm3      Hemoglobin 11.9 (L) g/dL      Hematocrit 38.3 %      MCV 85.7 fL      MCH 26.6 (L) pg      MCHC 31.1 (L) g/dL      RDW 22.0 (H) %      RDW-SD 65.6 (H) fl      MPV 10.2 (H) fL      Platelets 195 10*3/mm3      Neutrophil % 64.8 %      Lymphocyte % 19.9 %      Monocyte % 6.7 %      Eosinophil % 8.1 (H) %      Basophil % 0.5 %      Immature Grans % 0.0 %      Neutrophils, Absolute 3.74 10*3/mm3       Lymphocytes, Absolute 1.15 10*3/mm3      Monocytes, Absolute 0.39 10*3/mm3      Eosinophils, Absolute 0.47 10*3/mm3      Basophils, Absolute 0.03 10*3/mm3      Immature Grans, Absolute 0.00 10*3/mm3     Basic Metabolic Panel [[id number]]  (Abnormal) Collected:  08/01/17 0535    Specimen:  Blood Updated:  08/01/17 0624     Glucose 106 mg/dL      BUN 27 (H) mg/dL      Creatinine 1.87 (H) mg/dL      Sodium 140 mmol/L      Potassium 3.8 mmol/L      Chloride 102 mmol/L      CO2 30.7 mmol/L      Calcium 9.8 mg/dL      eGFR Non African Amer 26 (L) mL/min/1.73      BUN/Creatinine Ratio 14.4     Anion Gap 7.3 mmol/L     Narrative:       The MDRD GFR formula is only valid for adults with stable renal function between ages 18 and 70.    Osmolality, Calculated [[id number]]  (Normal) Collected:  08/01/17 0535    Specimen:  Blood Updated:  08/01/17 0624     Osmolality Calc 284.9 mOsm/kg         Imaging Results (last 24 hours)     ** No results found for the last 24 hours. **        Assessment:Active Problems:    Right heart failure    Heart failure, chronic, right-sided   Chronic kidney disease  Plan: IV Lasix

## 2017-08-01 NOTE — THERAPY EVALUATION
Acute Care - Physical Therapy Initial Evaluation   [facility]     Patient Name: Tre Kinney  : 1932  MRN: [id number]  Today's Date: 2017      Date of Referral to PT: 17  Referring Physician: Dr. Barone      Admit Date: 2017     Visit Dx:  No diagnosis found.  Patient Active Problem List   Diagnosis   • Acid reflux   • Ankle arthralgia   • Cardiac conduction disorder   • Closed fracture of distal fibula   • Arteriosclerosis of coronary artery   • Fracture of distal end of tibia   • Elevated cholesterol   • BP (high blood pressure)   • Hypertrophic polyarthritis   • OP (osteoporosis)   • Right heart failure   • Heart failure, chronic, right-sided     Past Medical History:   Diagnosis Date   • Arthritis    • CHF (congestive heart failure)    • Coronary artery disease    • H/O blood clots    • History of transfusion     no reaction    • Hypertension      Past Surgical History:   Procedure Laterality Date   • ABDOMINAL SURGERY     • APPENDECTOMY     • CARDIAC SURGERY     •  SECTION     • CORONARY ANGIOPLASTY WITH STENT PLACEMENT     • PACEMAKER IMPLANTATION      left side    • SKIN BIOPSY     • TUBAL ABDOMINAL LIGATION            PT ASSESSMENT (last 72 hours)      PT Evaluation       17 1400 17 0925    Rehab Evaluation    Document Type evaluation  -AD     Subjective Information agree to therapy;complains of;weakness;fatigue  -AD     Patient Effort, Rehab Treatment good  -AD     General Information    Patient Profile Review yes  -AD     Referring Physician Dr. Barone  -AD     General Observations Prior to the treatment session, the patient was supine in bed on 3LO2. She was alert and oriented throughout the session.  -AD     Pertinent History Of Current Problem Pt reports she starting feeling bad and experienced swelling in her feet. She reports she receives home health three times a week for physical therapy.  -AD     Precautions/Limitations oxygen therapy device and L/min;fall  precautions   3L  -AD     Prior Level of Function min assist:;mod assist:  -AD     Equipment Currently Used at Home oxygen;respiratory supplies;commode;hospital bed  -AD     Plans/Goals Discussed With patient  -AD     Risks Reviewed patient:  -AD     Benefits Reviewed patient:;improve function;increase independence;increase strength;increase balance;decrease risk of DVT  -AD     Living Environment    Lives With child(abner), adult  -AD     Living Arrangements house  -AD     Transportation Available family or friend will provide  -AD     Clinical Impression    Date of Referral to PT 07/31/17  -AD     PT Diagnosis Functional decline, generalized weakness.  -AD     Prognosis Good  -AD     Functional Level At Time Of Evaluation Min assist  -AD     Patient/Family Goals Statement Home with assistance  -AD     Criteria for Skilled Therapeutic Interventions Met yes;treatment indicated  -AD     Impairments Found (describe specific impairments) gait, locomotion, and balance;aerobic capacity/endurance  -AD     Rehab Potential good, to achieve stated therapy goals  -AD     Predicted Duration of Therapy Intervention (days/wks) Until discharge  -AD     Cognitive Assessment/Intervention    Current Cognitive/Communication Assessment functional  -AD     Orientation Status oriented x 4  -AD     Follows Commands/Answers Questions able to follow single-step instructions;needs cueing;75% of the time  -AD     Personal Safety decreased awareness, need for assist;decreased awareness, need for safety  -AD     Personal Safety Interventions gait belt;nonskid shoes/slippers when out of bed;fall prevention program maintained  -AD     ROM (Range of Motion)    General ROM no range of motion deficits identified  -AD     MMT (Manual Muscle Testing)    General MMT Assessment Detail Bilateral hip flexion: 3+/5; Right knee flexion 4-/5, left knee flexion 4/5; Bilateral knee extension 3+/5.  -AD     Muscle Tone Assessment    Muscle Tone Assessment   Bilateral Lower Extremities  -TONY    Bilateral Lower Extremities Muscle Tone Assessment  mildly decreased tone  -TONY    Bed Mobility, Assessment/Treatment    Bed Mobility, Assistive Device bed rails  -AD     Bed Mob, Supine to Sit, [place] minimum assist (75% patient effort)  -AD     Bed Mob, Sit to Supine, [place] minimum assist (75% patient effort)  -AD     Bed Mobility, Safety Issues decreased use of arms for pushing/pulling;decreased use of legs for bridging/pushing  -AD     Bed Mobility, Impairments strength decreased  -AD     Transfer Assessment/Treatment    Transfers, Bed-Chair [place] minimum assist (75% patient effort);2 person assist required  -AD     Transfers, Chair-Bed [place] minimum assist (75% patient effort);2 person assist required  -AD     Transfers, Sit-Stand [place] minimum assist (75% patient effort);2 person assist required  -AD     Transfers, Stand-Sit [place] minimum assist (75% patient effort);2 person assist required  -AD     Transfers, Sit-Stand-Sit, Assist Device standard walker  -AD     Gait Assessment/Treatment    Gait, [place] Level minimum assist (75% patient effort)  -AD     Gait, Assistive Device rolling walker  -AD     Gait, Distance (Feet) 75  -AD     Gait, Gait Pattern Analysis swing-through gait  -AD     Gait, Gait Deviations calvin decreased  -AD     Gait, Safety Issues step length decreased  -AD     Gait, Impairments strength decreased  -AD     Therapy Exercises    Bilateral Lower Extremities AROM:;10 reps;sitting  -AD     Positioning and Restraints    Pre-Treatment Position in bed  -AD     Post Treatment Position chair  -AD     In Chair notified nsg;sitting;call light within reach;encouraged to call for assist   3L O2  -AD       07/31/17 1914 07/31/17 0830    Muscle Tone Assessment    Muscle Tone Assessment Bilateral Lower Extremities  -RJ Bilateral Lower Extremities  -HW    Bilateral Lower Extremities Muscle Tone Assessment mildly decreased  tone  -RJ mildly decreased tone  -HW      07/30/17 2001 07/30/17 0739    Muscle Tone Assessment    Muscle Tone Assessment Bilateral Lower Extremities  -RN Bilateral Lower Extremities  -KL    Bilateral Lower Extremities Muscle Tone Assessment mildly decreased tone  -RN mildly decreased tone  -KL      07/29/17 2004       Muscle Tone Assessment    Muscle Tone Assessment Bilateral Lower Extremities  -SN     Bilateral Lower Extremities Muscle Tone Assessment mildly decreased tone  -SN       User Key  (r) = Recorded By, (t) = Taken By, (c) = Cosigned By    Initials Name Provider Type    AD Ashley Claudene Dalton, PT Physical Therapist    TONY Garcia, RN Registered Nurse    SN Chetna Mujica, RN Registered Nurse    RJ Alysha Hwang, RN Registered Nurse     Chuyita Lorenzo, RN Registered Nurse     [place] Ashley, RN Registered Nurse    RN Nas Tariq, RN Registered Nurse          Physical Therapy Education     Title: PT OT SLP Therapies (Done)     Topic: Physical Therapy (Done)     Point: Mobility training (Done)    Learning Progress Summary    Learner Readiness Method Response Comment Documented by Status   Patient Acceptance E VU  AD 08/01/17 1452 Done    Acceptance E VU  TONY 08/01/17 1110 Done    Acceptance E VU  SN 07/29/17 2031 Done    Acceptance E VU  KM 07/28/17 2053 Done   Family Acceptance E VU  KM 07/28/17 2053 Done               Point: Home exercise program (Done)    Learning Progress Summary    Learner Readiness Method Response Comment Documented by Status   Patient Acceptance E VU  AD 08/01/17 1452 Done    Acceptance E VU  TONY 08/01/17 1110 Done    Acceptance E VU  SN 07/29/17 2031 Done    Acceptance E VU  KM 07/28/17 2053 Done   Family Acceptance E VU  KM 07/28/17 2053 Done               Point: Body mechanics (Done)    Learning Progress Summary    Learner Readiness Method Response Comment Documented by Status   Patient Acceptance E VU  AD 08/01/17 1452 Done    Acceptance E VU   TONY 08/01/17 1110 Done    Acceptance E VU  SN 07/29/17 2031 Done    Acceptance E VU  KM 07/28/17 2053 Done   Family Acceptance E VU  KM 07/28/17 2053 Done               Point: Precautions (Done)    Learning Progress Summary    Learner Readiness Method Response Comment Documented by Status   Patient Acceptance E VU  AD 08/01/17 1452 Done    Acceptance E VU  TONY 08/01/17 1110 Done    Acceptance E VU  SN 07/29/17 2031 Done    Acceptance E VU  KM 07/28/17 2053 Done   Family Acceptance E VU  KM 07/28/17 2053 Done                      User Key     Initials Effective Dates Name Provider Type Discipline    AD 02/04/16 -  Ashley Claudene Dalton, PT Physical Therapist PT     06/16/16 -  Ricky Savage, RN Registered Nurse Nurse    TONY 06/16/16 -  Genoveva Garcia, RN Registered Nurse Nurse     11/08/16 -  Chetna Mujica, RN Registered Nurse Nurse                PT Recommendation and Plan  Planned Therapy Interventions: balance training, bed mobility training, gait training, home exercise program, neuromuscular re-education, patient/family education, postural re-education, ROM (Range of Motion), strengthening, stretching, transfer training  PT Frequency: 3-5 times/wk, per priority policy             IP PT Goals       08/01/17 1453          Bed Mobility PT LTG    Bed Mobility PT LTG, Date Established 08/01/17  -AD      Bed Mobility PT LTG, Time to Achieve by discharge  -AD      Bed Mobility PT LTG, Activity Type supine to sit/sit to supine  -AD      Bed Mobility PT LTG, [place] Level contact guard assist;verbal cues required  -AD      Bed Mobility PT Goal  LTG, Assist Device bed rails  -AD      Transfer Training PT LTG    Transfer Training PT LTG, Date Established 08/01/17  -AD      Transfer Training PT LTG, Time to Achieve by discharge  -AD      Transfer Training PT LTG, Activity Type bed to chair /chair to bed;sit to stand/stand to sit  -AD      Transfer Training PT LTG, [place] Level minimum assist  (75% patient effort)  -AD      Transfer Training PT LTG, Assist Device walker, rolling  -AD      Gait Training PT LTG    Gait Training Goal PT LTG, Date Established 08/01/17  -AD      Gait Training Goal PT LTG, Time to Achieve by discharge  -AD      Gait Training Goal PT LTG, [place] Level minimum assist (75% patient effort)  -AD      Gait Training Goal PT LTG, Assist Device walker, rolling  -AD      Gait Training Goal PT LTG, Distance to Achieve 150  -AD        User Key  (r) = Recorded By, (t) = Taken By, (c) = Cosigned By    Initials Name Provider Type    AD Ashley Claudene Dalton, PT Physical Therapist                Outcome Measures       08/01/17 1400          How much help from another person do you currently need...    Turning from your back to your side while in flat bed without using bedrails? 3  -AD      Moving from lying on back to sitting on the side of a flat bed without bedrails? 3  -AD      Moving to and from a bed to a chair (including a wheelchair)? 3  -AD      Standing up from a chair using your arms (e.g., wheelchair, bedside chair)? 3  -AD      Climbing 3-5 steps with a railing? 2  -AD      To walk in hospital room? 3  -AD      AM-PAC 6 Clicks Score 17  -AD      Functional Assessment    Outcome Measure Options AM-PAC 6 Clicks Basic Mobility (PT)  -AD        User Key  (r) = Recorded By, (t) = Taken By, (c) = Cosigned By    Initials Name Provider Type    AD Ashley Claudene Dalton, PT Physical Therapist           Time Calculation:         PT Charges       08/01/17 5718          Time Calculation    Start Time --   45 minutes  -AD      PT Received On 08/01/17  -AD      PT - Next Appointment 08/02/17  -AD      PT Goal Re-Cert Due Date 08/15/17  -AD        User Key  (r) = Recorded By, (t) = Taken By, (c) = Cosigned By    Initials Name Provider Type    AD Ashley Claudene Dalton, PT Physical Therapist          Therapy Charges for Today     Code Description Service Date Service Provider Modifiers Qty     85805820358 HC PT MOBILITY CURRENT 8/1/2017 Ashley Claudene Dalton, PT GP, CL 1    17870407283 HC PT MOBILITY PROJECTED 8/1/2017 Ashley Claudene Dalton, PT GP, CL 1    37012140955 HC PT THER SUPP EA 15 MIN 8/1/2017 Ashley Claudene Dalton, PT GP 1    [id number] HC PT EVAL HIGH COMPLEXITY 3 8/1/2017 Ashley Claudene Dalton, PT GP 1          PT G-Codes  PT Professional Judgement Used?: Yes  Outcome Measure Options: AM-PAC 6 Clicks Basic Mobility (PT)  Functional Limitation: Mobility: Walking and moving around  Mobility: Walking and Moving Around Current Status (): At least 60 percent but less than 80 percent impaired, limited or restricted  Mobility: Walking and Moving Around Goal Status (): At least 60 percent but less than 80 percent impaired, limited or restricted      Ashley Claudene Dalton, PT  8/1/2017

## 2017-08-02 LAB
ANION GAP SERPL CALCULATED.3IONS-SCNC: 10.6 MMOL/L (ref 3.6–11.2)
BASOPHILS # BLD AUTO: 0.04 10*3/MM3 (ref 0–0.3)
BASOPHILS NFR BLD AUTO: 0.7 % (ref 0–2)
BNP SERPL-MCNC: 2181 PG/ML (ref 0–100)
BUN BLD-MCNC: 25 MG/DL (ref 7–21)
BUN/CREAT SERPL: 16 (ref 7–25)
CALCIUM SPEC-SCNC: 9.5 MG/DL (ref 7.7–10)
CHLORIDE SERPL-SCNC: 100 MMOL/L (ref 99–112)
CO2 SERPL-SCNC: 31.4 MMOL/L (ref 24.3–31.9)
CREAT BLD-MCNC: 1.56 MG/DL (ref 0.43–1.29)
DEPRECATED RDW RBC AUTO: 64 FL (ref 37–54)
EOSINOPHIL # BLD AUTO: 0.29 10*3/MM3 (ref 0–0.7)
EOSINOPHIL NFR BLD AUTO: 5.2 % (ref 0–7)
ERYTHROCYTE [DISTWIDTH] IN BLOOD BY AUTOMATED COUNT: 21.6 % (ref 11.5–14.5)
GFR SERPL CREATININE-BSD FRML MDRD: 32 ML/MIN/1.73
GLUCOSE BLD-MCNC: 104 MG/DL (ref 70–110)
HCT VFR BLD AUTO: 34.3 % (ref 37–47)
HGB BLD-MCNC: 10.8 G/DL (ref 12–16)
IMM GRANULOCYTES # BLD: 0 10*3/MM3 (ref 0–0.03)
IMM GRANULOCYTES NFR BLD: 0 % (ref 0–0.5)
INR PPP: 2.9 (ref 0.9–1.1)
LYMPHOCYTES # BLD AUTO: 0.99 10*3/MM3 (ref 1–3)
LYMPHOCYTES NFR BLD AUTO: 17.8 % (ref 16–46)
MCH RBC QN AUTO: 26.5 PG (ref 27–33)
MCHC RBC AUTO-ENTMCNC: 31.5 G/DL (ref 33–37)
MCV RBC AUTO: 84.1 FL (ref 80–94)
MONOCYTES # BLD AUTO: 0.41 10*3/MM3 (ref 0.1–0.9)
MONOCYTES NFR BLD AUTO: 7.4 % (ref 0–12)
NEUTROPHILS # BLD AUTO: 3.84 10*3/MM3 (ref 1.4–6.5)
NEUTROPHILS NFR BLD AUTO: 68.9 % (ref 40–75)
OSMOLALITY SERPL CALC.SUM OF ELEC: 287.8 MOSM/KG (ref 273–305)
PLATELET # BLD AUTO: 187 10*3/MM3 (ref 130–400)
PMV BLD AUTO: 10 FL (ref 6–10)
POTASSIUM BLD-SCNC: 3.4 MMOL/L (ref 3.5–5.3)
PROTHROMBIN TIME: 30.8 SECONDS (ref 11–15.4)
RBC # BLD AUTO: 4.08 10*6/MM3 (ref 4.2–5.4)
SODIUM BLD-SCNC: 142 MMOL/L (ref 135–153)
WBC NRBC COR # BLD: 5.57 10*3/MM3 (ref 4.5–12.5)

## 2017-08-02 PROCEDURE — 85025 COMPLETE CBC W/AUTO DIFF WBC: CPT | Performed by: FAMILY MEDICINE

## 2017-08-02 PROCEDURE — 85610 PROTHROMBIN TIME: CPT | Performed by: FAMILY MEDICINE

## 2017-08-02 PROCEDURE — 25010000002 FUROSEMIDE PER 20 MG: Performed by: INTERNAL MEDICINE

## 2017-08-02 PROCEDURE — 94799 UNLISTED PULMONARY SVC/PX: CPT

## 2017-08-02 PROCEDURE — 99232 SBSQ HOSP IP/OBS MODERATE 35: CPT | Performed by: INTERNAL MEDICINE

## 2017-08-02 PROCEDURE — 83880 ASSAY OF NATRIURETIC PEPTIDE: CPT | Performed by: PHYSICIAN ASSISTANT

## 2017-08-02 PROCEDURE — 25010000002 FUROSEMIDE PER 20 MG: Performed by: FAMILY MEDICINE

## 2017-08-02 PROCEDURE — 80048 BASIC METABOLIC PNL TOTAL CA: CPT | Performed by: FAMILY MEDICINE

## 2017-08-02 PROCEDURE — 97116 GAIT TRAINING THERAPY: CPT | Performed by: PHYSICAL THERAPIST

## 2017-08-02 PROCEDURE — 97530 THERAPEUTIC ACTIVITIES: CPT | Performed by: PHYSICAL THERAPIST

## 2017-08-02 RX ORDER — WARFARIN SODIUM 4 MG/1
4 TABLET ORAL
Status: COMPLETED | OUTPATIENT
Start: 2017-08-02 | End: 2017-08-02

## 2017-08-02 RX ORDER — FUROSEMIDE 10 MG/ML
40 INJECTION INTRAMUSCULAR; INTRAVENOUS EVERY 12 HOURS
Status: DISCONTINUED | OUTPATIENT
Start: 2017-08-02 | End: 2017-08-03

## 2017-08-02 RX ORDER — HYDRALAZINE HYDROCHLORIDE 50 MG/1
50 TABLET, FILM COATED ORAL EVERY 8 HOURS SCHEDULED
Status: DISCONTINUED | OUTPATIENT
Start: 2017-08-02 | End: 2017-08-03

## 2017-08-02 RX ORDER — POTASSIUM CHLORIDE 20 MEQ/1
40 TABLET, EXTENDED RELEASE ORAL ONCE
Status: COMPLETED | OUTPATIENT
Start: 2017-08-02 | End: 2017-08-02

## 2017-08-02 RX ADMIN — HYDRALAZINE HYDROCHLORIDE 50 MG: 50 TABLET ORAL at 21:01

## 2017-08-02 RX ADMIN — FUROSEMIDE 40 MG: 10 INJECTION, SOLUTION INTRAMUSCULAR; INTRAVENOUS at 21:00

## 2017-08-02 RX ADMIN — HYDRALAZINE HYDROCHLORIDE 25 MG: 50 TABLET ORAL at 12:12

## 2017-08-02 RX ADMIN — DOCUSATE SODIUM 100 MG: 100 CAPSULE, LIQUID FILLED ORAL at 08:42

## 2017-08-02 RX ADMIN — ATORVASTATIN CALCIUM 40 MG: 40 TABLET, FILM COATED ORAL at 21:01

## 2017-08-02 RX ADMIN — ALPRAZOLAM 0.5 MG: 0.5 TABLET ORAL at 08:42

## 2017-08-02 RX ADMIN — METOPROLOL SUCCINATE 50 MG: 50 TABLET, FILM COATED, EXTENDED RELEASE ORAL at 21:02

## 2017-08-02 RX ADMIN — METOPROLOL SUCCINATE 50 MG: 50 TABLET, FILM COATED, EXTENDED RELEASE ORAL at 08:42

## 2017-08-02 RX ADMIN — ALBUTEROL SULFATE 2.5 MG: 2.5 SOLUTION RESPIRATORY (INHALATION) at 13:03

## 2017-08-02 RX ADMIN — ALPRAZOLAM 0.5 MG: 0.5 TABLET ORAL at 21:02

## 2017-08-02 RX ADMIN — CHOLECALCIFEROL CAP 125 MCG (5000 UNIT) 5000 UNITS: 125 CAP at 12:12

## 2017-08-02 RX ADMIN — HYDRALAZINE HYDROCHLORIDE 25 MG: 50 TABLET ORAL at 05:20

## 2017-08-02 RX ADMIN — ASPIRIN 81 MG: 81 TABLET ORAL at 08:42

## 2017-08-02 RX ADMIN — PANTOPRAZOLE SODIUM 40 MG: 40 TABLET, DELAYED RELEASE ORAL at 05:20

## 2017-08-02 RX ADMIN — ISOSORBIDE MONONITRATE 60 MG: 60 TABLET, EXTENDED RELEASE ORAL at 08:42

## 2017-08-02 RX ADMIN — WARFARIN SODIUM 4 MG: 4 TABLET ORAL at 17:23

## 2017-08-02 RX ADMIN — FUROSEMIDE 40 MG: 10 INJECTION, SOLUTION INTRAMUSCULAR; INTRAVENOUS at 08:42

## 2017-08-02 RX ADMIN — ALBUTEROL SULFATE 2.5 MG: 2.5 SOLUTION RESPIRATORY (INHALATION) at 06:12

## 2017-08-02 RX ADMIN — POTASSIUM CHLORIDE 40 MEQ: 1500 TABLET, EXTENDED RELEASE ORAL at 12:12

## 2017-08-02 RX ADMIN — ALBUTEROL SULFATE 2.5 MG: 2.5 SOLUTION RESPIRATORY (INHALATION) at 18:45

## 2017-08-02 RX ADMIN — CITALOPRAM HYDROBROMIDE 20 MG: 20 TABLET ORAL at 08:42

## 2017-08-02 RX ADMIN — DOCUSATE SODIUM 100 MG: 100 CAPSULE, LIQUID FILLED ORAL at 17:23

## 2017-08-02 NOTE — PLAN OF CARE
Problem: Fluid Volume Excess (Adult,Obstetrics,Pediatric)  Goal: Identify Related Risk Factors and Signs and Symptoms  Outcome: Ongoing (interventions implemented as appropriate)    07/30/17 2225 07/31/17 1951   Fluid Volume Excess   Fluid Volume Excess: Related Risk Factors --  cardiac changes   Signs and Symptoms (Fluid Volume Excess) activity intolerance;edema --        Goal: Stable Weight  Outcome: Ongoing (interventions implemented as appropriate)  Goal: Balanced Intake/Output  Outcome: Ongoing (interventions implemented as appropriate)    Problem: Fall Risk (Adult)  Goal: Identify Related Risk Factors and Signs and Symptoms  Outcome: Ongoing (interventions implemented as appropriate)  Goal: Absence of Falls  Outcome: Ongoing (interventions implemented as appropriate)    Problem: Skin Integrity Impairment, Risk/Actual (Adult)  Goal: Identify Related Risk Factors and Signs and Symptoms  Outcome: Ongoing (interventions implemented as appropriate)  Goal: Skin Integrity/Wound Healing  Outcome: Ongoing (interventions implemented as appropriate)    Problem: Patient Care Overview (Adult)  Goal: Plan of Care Review  Outcome: Ongoing (interventions implemented as appropriate)  Goal: Adult Individualization and Mutuality  Outcome: Ongoing (interventions implemented as appropriate)

## 2017-08-02 NOTE — PROGRESS NOTES
Discharge Planning Assessment   [facility]     Patient Name: Tre Kinney  MRN: [id number]  Today's Date: 8/2/2017    Admit Date: 7/27/2017          Discharge Needs Assessment     None            Discharge Plan       08/02/17 0940    Case Management/Social Work Plan    Plan Pt admitted on 7/27/17.  Pt lives at home with Son and Grandson and plans to return home at discharge.  Pt currently utilizes home 02, hospital bed and walker via unknown provider.  Pt curerntly utilizes [facility].  [facility] will need notification with report per [facility] RN at discharge.  SS will follow and assist with discharge needs.    Patient/Family In Agreement With Plan yes        Discharge Placement     No information found        Expected Discharge Date and Time     Expected Discharge Date Expected Discharge Time    Aug 3, 2017               Demographic Summary     None            Functional Status     None            Psychosocial     None            Abuse/Neglect     None            Legal     None            Substance Abuse     None            Patient Forms     None          Mary Salgado

## 2017-08-02 NOTE — PROGRESS NOTES
Tre Kinney 84 y.o.   08/02/17    Subjective: Patient still winded and has dyspnea on exertion with minimal ambulation  Objective: Vital signs stable bilateral rhonchi and crackles  Vital Signs (last 72 hrs)       07/29 0700  -  07/30 0659 07/30 0700  -  07/31 0659 07/31 0700  -  08/01 0659 08/01 0700  -  08/02 0554   Most Recent    Temp (°F) 97.5 -  97.7    97.1 -  98.1    97.6 -  98.1    97.5 -  98.3     98.1 (36.7)    Heart Rate 72 -  83    79 -  81    78 -  82    75 -  84     84    Resp 18 -  20    18 -  22    18 -  22    18 -  23     20    /75 -  146/74    131/63 -  146/76    118/68 -  150/83    130/79 -  152/93     130/79    SpO2 (%) 93 -  96    92 -  98    91 -  95    90 -  97     96        Lab Results (last 72 hours)     Procedure Component Value Units Date/Time    CBC & Differential [[id number]] Collected:  07/30/17 0857    Specimen:  Blood Updated:  07/30/17 0909    Narrative:       The following orders were created for panel order CBC & Differential.  Procedure                               Abnormality         Status                     ---------                               -----------         ------                     CBC Auto Differential[[id number]]        Abnormal            Final result                 Please view results for these tests on the individual orders.    CBC Auto Differential [[id number]]  (Abnormal) Collected:  07/30/17 0857    Specimen:  Blood Updated:  07/30/17 0909     WBC 4.42 (L) 10*3/mm3      RBC 4.10 (L) 10*6/mm3      Hemoglobin 10.8 (L) g/dL      Hematocrit 34.3 (L) %      MCV 83.7 fL      MCH 26.3 (L) pg      MCHC 31.5 (L) g/dL      RDW 21.9 (H) %      RDW-SD 66.3 (H) fl      MPV 10.0 fL      Platelets 183 10*3/mm3      Neutrophil % 59.9 %      Lymphocyte % 20.4 %      Monocyte % 7.9 %      Eosinophil % 10.9 (H) %      Basophil % 0.7 %      Immature Grans % 0.2 %      Neutrophils, Absolute 2.65 10*3/mm3      Lymphocytes, Absolute 0.90 (L) 10*3/mm3      Monocytes,  Absolute 0.35 10*3/mm3      Eosinophils, Absolute 0.48 10*3/mm3      Basophils, Absolute 0.03 10*3/mm3      Immature Grans, Absolute 0.01 10*3/mm3     Basic Metabolic Panel [[id number]]  (Abnormal) Collected:  07/30/17 0857    Specimen:  Blood Updated:  07/30/17 0929     Glucose 91 mg/dL      BUN 29 (H) mg/dL      Creatinine 1.81 (H) mg/dL      Sodium 140 mmol/L      Potassium 3.8 mmol/L      Chloride 104 mmol/L      CO2 30.8 mmol/L      Calcium 8.8 mg/dL      eGFR Non African Amer 27 (L) mL/min/1.73      BUN/Creatinine Ratio 16.0     Anion Gap 5.2 mmol/L     Narrative:       The MDRD GFR formula is only valid for adults with stable renal function between ages 18 and 70.    Osmolality, Calculated [[id number]]  (Normal) Collected:  07/30/17 0857    Specimen:  Blood Updated:  07/30/17 0929     Osmolality Calc 284.8 mOsm/kg     Protime-INR [[id number]]  (Abnormal) Collected:  07/31/17 0125    Specimen:  Blood Updated:  07/31/17 0145     Protime 25.4 (H) Seconds       Note new Reference Range        INR 2.27 (H)    Narrative:       Suggested INR therapeutic range for stable oral anticoagulant therapy:    Low Intensity therapy:   1.5-2.0  Moderate Intensity therapy:   2.0-3.0  High Intensity therapy:   2.5-4.0    CBC & Differential [[id number]] Collected:  07/31/17 0124    Specimen:  Blood Updated:  07/31/17 0226    Narrative:       The following orders were created for panel order CBC & Differential.  Procedure                               Abnormality         Status                     ---------                               -----------         ------                     Scan Slide[[id number]]                                       Final result               CBC Auto Differential[[id number]]        Abnormal            Final result                 Please view results for these tests on the individual orders.    CBC Auto Differential [[id number]]  (Abnormal) Collected:  07/31/17 0124    Specimen:  Blood Updated:  07/31/17 0226      WBC 5.18 10*3/mm3      RBC 3.97 (L) 10*6/mm3      Hemoglobin 10.4 (L) g/dL      Hematocrit 34.1 (L) %      MCV 85.9 fL      MCH 26.2 (L) pg      MCHC 30.5 (L) g/dL      RDW 22.2 (H) %      RDW-SD 66.4 (H) fl      MPV 10.6 (H) fL      Platelets 200 10*3/mm3      Neutrophil % 62.4 %      Lymphocyte % 18.3 %      Monocyte % 8.1 %      Eosinophil % 10.0 (H) %      Basophil % 1.0 %      Immature Grans % 0.2 %      Neutrophils, Absolute 3.23 10*3/mm3      Lymphocytes, Absolute 0.95 (L) 10*3/mm3      Monocytes, Absolute 0.42 10*3/mm3      Eosinophils, Absolute 0.52 10*3/mm3      Basophils, Absolute 0.05 10*3/mm3      Immature Grans, Absolute 0.01 10*3/mm3     Scan Slide [[id number]] Collected:  07/31/17 0124    Specimen:  Blood Updated:  07/31/17 0226     Anisocytosis Large/3+     Hypochromia Slight/1+     Platelet Morphology Normal    Basic Metabolic Panel [[id number]]  (Abnormal) Collected:  07/31/17 0245    Specimen:  Blood Updated:  07/31/17 0400     Glucose 95 mg/dL      BUN 24 (H) mg/dL      Creatinine 1.83 (H) mg/dL      Sodium 139 mmol/L      Potassium 4.0 mmol/L       1+ Hemolysis         Chloride 107 mmol/L      CO2 27.8 mmol/L      Calcium 9.1 mg/dL      eGFR Non African Amer 26 (L) mL/min/1.73      BUN/Creatinine Ratio 13.1     Anion Gap 4.2 mmol/L     Narrative:       The MDRD GFR formula is only valid for adults with stable renal function between ages 18 and 70.    Osmolality, Calculated [[id number]]  (Normal) Collected:  07/31/17 0245    Specimen:  Blood Updated:  07/31/17 0400     Osmolality Calc 281.4 mOsm/kg     Protime-INR [[id number]]  (Abnormal) Collected:  08/01/17 0535    Specimen:  Blood Updated:  08/01/17 0603     Protime 23.5 (H) Seconds       Note new Reference Range        INR 2.06 (H)    Narrative:       Suggested INR therapeutic range for stable oral anticoagulant therapy:    Low Intensity therapy:   1.5-2.0  Moderate Intensity therapy:   2.0-3.0  High Intensity therapy:   2.5-4.0    CBC &  Differential [[id number]] Collected:  08/01/17 0535    Specimen:  Blood Updated:  08/01/17 0616    Narrative:       The following orders were created for panel order CBC & Differential.  Procedure                               Abnormality         Status                     ---------                               -----------         ------                     CBC Auto Differential[[id number]]        Abnormal            Final result                 Please view results for these tests on the individual orders.    CBC Auto Differential [[id number]]  (Abnormal) Collected:  08/01/17 0535    Specimen:  Blood Updated:  08/01/17 0616     WBC 5.78 10*3/mm3      RBC 4.47 10*6/mm3      Hemoglobin 11.9 (L) g/dL      Hematocrit 38.3 %      MCV 85.7 fL      MCH 26.6 (L) pg      MCHC 31.1 (L) g/dL      RDW 22.0 (H) %      RDW-SD 65.6 (H) fl      MPV 10.2 (H) fL      Platelets 195 10*3/mm3      Neutrophil % 64.8 %      Lymphocyte % 19.9 %      Monocyte % 6.7 %      Eosinophil % 8.1 (H) %      Basophil % 0.5 %      Immature Grans % 0.0 %      Neutrophils, Absolute 3.74 10*3/mm3      Lymphocytes, Absolute 1.15 10*3/mm3      Monocytes, Absolute 0.39 10*3/mm3      Eosinophils, Absolute 0.47 10*3/mm3      Basophils, Absolute 0.03 10*3/mm3      Immature Grans, Absolute 0.00 10*3/mm3     Basic Metabolic Panel [[id number]]  (Abnormal) Collected:  08/01/17 0535    Specimen:  Blood Updated:  08/01/17 0624     Glucose 106 mg/dL      BUN 27 (H) mg/dL      Creatinine 1.87 (H) mg/dL      Sodium 140 mmol/L      Potassium 3.8 mmol/L      Chloride 102 mmol/L      CO2 30.7 mmol/L      Calcium 9.8 mg/dL      eGFR Non African Amer 26 (L) mL/min/1.73      BUN/Creatinine Ratio 14.4     Anion Gap 7.3 mmol/L     Narrative:       The MDRD GFR formula is only valid for adults with stable renal function between ages 18 and 70.    Osmolality, Calculated [[id number]]  (Normal) Collected:  08/01/17 0535    Specimen:  Blood Updated:  08/01/17 0624     Osmolality Calc  284.9 mOsm/kg     BNP [[id number]]  (Abnormal) Collected:  08/01/17 0535    Specimen:  Blood Updated:  08/01/17 0645     BNP 1707.0 (H) pg/mL     Blood Gas, Arterial [[id number]]  (Abnormal) Collected:  08/01/17 0733    Specimen:  Arterial Blood Updated:  08/01/17 0753     Site Arterial: left brachial     Devang's Test Positive     pH, Arterial 7.410 pH units      pCO2, Arterial 49.5 (H) mm Hg      pO2, Arterial 67.4 (L) mm Hg      HCO3, Arterial 30.7 (C) mmol/L      Base Excess, Arterial 5.1 mmol/L      O2 Saturation, Arterial 93.2 %      Hemoglobin, Blood Gas 12.3 g/dL      Hematocrit, Blood Gas 36.0 (L) %      Oxyhemoglobin 91.8 %      Methemoglobin 0.3 %      Carboxyhemoglobin 1.2 %      A-a Gradiant 92.9 mmHg      Temperature 98.6 C      Barometric Pressure for Blood Gas 729 mmHg      Modality Cannula - Nasal     FIO2 32 %         Imaging Results (last 24 hours)     Procedure Component Value Units Date/Time    XR Chest 1 View [[id number]] Collected:  08/01/17 1414     Updated:  08/01/17 1416    Narrative:       Technique: Frontal view the chest.     COMPARISON:  06/14/2017     INDICATION:     CHF      FINDINGS:    Bibasilar airspace disease or alveolar edema. Cardiomegaly  is noted. Prominent interstitial markings are noted. There are small  bilateral pleural effusions.        Impression:       Radiographic changes of congestive failure.     This report was finalized on 8/1/2017 2:14 PM by Dr. Ion Del Rosario MD.           Assessment:Active Problems:    Right heart failure    Heart failure, chronic, right-sided   Acute systolic heart failur  Ischemic cardiomyopathy with an EF of 36%  Severe tricuspid regurgitation  Chronic kidney disease  Plan: Continue general diuresis and medication adjustments per cardiology

## 2017-08-02 NOTE — PROGRESS NOTES
Nephrology  Note      Subjective       She reports improvement in breathing and increased urine output with lasix  She had BM after lactulose yesterday. She is still SOB on exertion    Objective     Vital Signs  Temp:  [97.5 °F (36.4 °C)-98.3 °F (36.8 °C)] 97.7 °F (36.5 °C)  Heart Rate:  [75-84] 84  Resp:  [18-23] 18  BP: (130-152)/(69-93) 138/69       I/O last 3 completed shifts:  In: -   Out: 3950 [Urine:3350; Stool:600]    Physical Examination:    General Appearance : alert,  no distress  Head : normocephalic  Eyes :  no pallor  Throat : oral mucosa moist  Neck: + JVD  Lungs : few bibasilar crackles  Heart : regular rhythm & normal rate, normal S1, S2, no murmur   Abdomen : soft non-tender  Extremities :  1+ edema - better  Pulses :  palpable and equal bilaterally  Skin : no bleeding, bruising or rash  Neurologic : orientated to person, place, time, grossly no focal deficitis    Laboratory Data :      WBC WBC   Date Value Ref Range Status   08/02/2017 5.57 4.50 - 12.50 10*3/mm3 Final   08/01/2017 5.78 4.50 - 12.50 10*3/mm3 Final   07/31/2017 5.18 4.50 - 12.50 10*3/mm3 Final   07/30/2017 4.42 (L) 4.50 - 12.50 10*3/mm3 Final      HGB Hemoglobin   Date Value Ref Range Status   08/02/2017 10.8 (L) 12.0 - 16.0 g/dL Final   08/01/2017 11.9 (L) 12.0 - 16.0 g/dL Final   07/31/2017 10.4 (L) 12.0 - 16.0 g/dL Final   07/30/2017 10.8 (L) 12.0 - 16.0 g/dL Final      HCT Hematocrit   Date Value Ref Range Status   08/02/2017 34.3 (L) 37.0 - 47.0 % Final   08/01/2017 38.3 37.0 - 47.0 % Final   07/31/2017 34.1 (L) 37.0 - 47.0 % Final   07/30/2017 34.3 (L) 37.0 - 47.0 % Final      Platlets No results found for: LABPLAT   MCV MCV   Date Value Ref Range Status   08/02/2017 84.1 80.0 - 94.0 fL Final   08/01/2017 85.7 80.0 - 94.0 fL Final   07/31/2017 85.9 80.0 - 94.0 fL Final   07/30/2017 83.7 80.0 - 94.0 fL Final          Sodium Sodium   Date Value Ref Range Status   08/02/2017 142 135 - 153 mmol/L Final   08/01/2017 140 135 - 153  mmol/L Final   07/31/2017 139 135 - 153 mmol/L Final   07/30/2017 140 135 - 153 mmol/L Final      Potassium Potassium   Date Value Ref Range Status   08/02/2017 3.4 (L) 3.5 - 5.3 mmol/L Final   08/01/2017 3.8 3.5 - 5.3 mmol/L Final   07/31/2017 4.0 3.5 - 5.3 mmol/L Final     Comment:     1+ Hemolysis    07/30/2017 3.8 3.5 - 5.3 mmol/L Final      Chloride Chloride   Date Value Ref Range Status   08/02/2017 100 99 - 112 mmol/L Final   08/01/2017 102 99 - 112 mmol/L Final   07/31/2017 107 99 - 112 mmol/L Final   07/30/2017 104 99 - 112 mmol/L Final      CO2 CO2   Date Value Ref Range Status   08/02/2017 31.4 24.3 - 31.9 mmol/L Final   08/01/2017 30.7 24.3 - 31.9 mmol/L Final   07/31/2017 27.8 24.3 - 31.9 mmol/L Final   07/30/2017 30.8 24.3 - 31.9 mmol/L Final      BUN BUN   Date Value Ref Range Status   08/02/2017 25 (H) 7 - 21 mg/dL Final   08/01/2017 27 (H) 7 - 21 mg/dL Final   07/31/2017 24 (H) 7 - 21 mg/dL Final   07/30/2017 29 (H) 7 - 21 mg/dL Final      Creatinine Creatinine   Date Value Ref Range Status   08/02/2017 1.56 (H) 0.43 - 1.29 mg/dL Final   08/01/2017 1.87 (H) 0.43 - 1.29 mg/dL Final   07/31/2017 1.83 (H) 0.43 - 1.29 mg/dL Final   07/30/2017 1.81 (H) 0.43 - 1.29 mg/dL Final      Calcium Calcium   Date Value Ref Range Status   08/02/2017 9.5 7.7 - 10.0 mg/dL Final   08/01/2017 9.8 7.7 - 10.0 mg/dL Final   07/31/2017 9.1 7.7 - 10.0 mg/dL Final   07/30/2017 8.8 7.7 - 10.0 mg/dL Final      PO4 No results found for: CAPO4   Albumin No results found for: ALBUMIN   Magnesium No results found for: MG   Uric Acid No results found for: URICACID     Radiology results :     Imaging Results (last 72 hours)     ** No results found for the last 72 hours. **        Imaging Results (last 24 hours)     Procedure Component Value Units Date/Time    XR Chest 1 View [[id number]] Collected:  08/01/17 1414     Updated:  08/01/17 1416    Narrative:       Technique: Frontal view the chest.     COMPARISON:  06/14/2017      INDICATION:     CHF      FINDINGS:    Bibasilar airspace disease or alveolar edema. Cardiomegaly  is noted. Prominent interstitial markings are noted. There are small  bilateral pleural effusions.        Impression:       Radiographic changes of congestive failure.     This report was finalized on 8/1/2017 2:14 PM by Dr. Ion Del Rosario MD.               Medications:        albuterol 2.5 mg Nebulization Q6H - RT   ALPRAZolam 0.5 mg Oral BID   aspirin 81 mg Oral Daily   atorvastatin 40 mg Oral Nightly   citalopram 20 mg Oral Daily   docusate sodium 100 mg Oral BID   furosemide 40 mg Intravenous Daily   hydrALAZINE 25 mg Oral Q8H   isosorbide mononitrate 60 mg Oral Daily   metoprolol succinate XL 50 mg Oral Q12H   pantoprazole 40 mg Oral Q AM   potassium chloride 40 mEq Oral Once   vitamin D3 5,000 Units Oral Daily       Pharmacy to dose warfarin        Assessment/Plan     Active Problems:    Right heart failure    Heart failure, chronic, right-sided      1. 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 Renal function has improved with lasix and creatinine is 1.5 today. I will increase lasix 40 mg iv x BID.     2. Systolic CHF exacerbation : some better. CXR from yesterday reviewed personally and shows pulmonary edema    3. HTN : stable    4. Constipation : better    5. Hypokalemia : replace    I discussed the patients findings and my recommendations with patient and nursing staff    Sebas Ellis MD  08/02/17  8:30 AM

## 2017-08-02 NOTE — PROGRESS NOTES
LOS: 5 days   Patient Care Team:  Myles Barone MD as PCP - General  Myles Barone MD as PCP - Family Medicine  Myles Barone MD as PCP - Claims Attributed  Kelli Márquez RN as Care Coordinator (Population Health)      Subjective     Admission information:    The patient is an 84-year-old white female patient of Dr. Grady in [place] with a history of hypertension, chronic kidney disease, coronary artery disease status post stent placement, symptomatic bradycardia status post pacemaker placement, dyslipidemia and previous history of DVT who comes to the hospital for progressive shortness of breath.  According to the patient she has a long history of lower extremity edema, volume overload and shortness of breath.  She states the current episode started approximately one week ago with worsening shortness of breath.  She reports her shortness of breath progressively worsening.  She denies any ameliorating or exacerbating factors.  She states the intensity was severe.  Due to the progressive nature of her symptoms and the fact that she has been hospitalized multiple times in the recent past she decided to come to the hospital for further evaluation.  She was admitted to the medicine service for further evaluation and cardiology was consulted.    Interval History:     The patient states she is doing much better today.  She states her breathing is improved on her lower extremity edema is now almost resolved.  She denies any chest pain, shortness breath or palpitations.    History taken from: patient chart    Vital Signs  Temp:  [97.7 °F (36.5 °C)-98.3 °F (36.8 °C)] 97.8 °F (36.6 °C)  Heart Rate:  [75-84] 81  Resp:  [18-23] 20  BP: (128-154)/(69-93) 154/78    Physical Exam:     Physical Exam   Constitutional: She is oriented to person, place, and time. She appears well-developed and well-nourished.   Elderly white female laying comfortably on bed.   HENT:   Mouth/Throat: Oropharynx is clear and  moist.   Eyes: EOM are normal. Pupils are equal, round, and reactive to light.   Neck: Neck supple. No JVD present. No tracheal deviation present. No thyromegaly present.   Cardiovascular: Normal rate, regular rhythm, S1 normal and S2 normal.  Exam reveals no gallop and no friction rub.    No murmur heard.  Pulmonary/Chest: Effort normal. No respiratory distress. She has no wheezes. She has no rales.   Abdominal: Soft. Bowel sounds are normal. She exhibits no mass. There is no tenderness.   Musculoskeletal: Normal range of motion. She exhibits edema (Mild.).   Lymphadenopathy:     She has no cervical adenopathy.   Neurological: She is alert and oriented to person, place, and time.   Skin: Skin is warm and dry. No rash noted.   Psychiatric: She has a normal mood and affect.       Results Review:     I reviewed the patient's new clinical results.  I reviewed the patient's new imaging results and agree with the interpretation.  I reviewed the patient's other test results and agree with the interpretation  I personally viewed and interpreted the patient's EKG/Telemetry data    Medication:  Scheduled Meds:    albuterol 2.5 mg Nebulization Q6H - RT   ALPRAZolam 0.5 mg Oral BID   aspirin 81 mg Oral Daily   atorvastatin 40 mg Oral Nightly   citalopram 20 mg Oral Daily   docusate sodium 100 mg Oral BID   furosemide 40 mg Intravenous Q12H   hydrALAZINE 25 mg Oral Q8H   isosorbide mononitrate 60 mg Oral Daily   metoprolol succinate XL 50 mg Oral Q12H   pantoprazole 40 mg Oral Q AM   vitamin D3 5,000 Units Oral Daily   warfarin 4 mg Oral Once     Continuous Infusions:    Pharmacy to dose warfarin      PRN Meds:.aluminum-magnesium hydroxide-simethicone  •  HYDROcodone-acetaminophen  •  Pharmacy to dose warfarin  •  sodium chloride    Telemetry:Ventricular pacing in the 80s.      Assessment/Plan     1.  Congestive heart failure: Patient with an episode of congestive heart failure likely worsened by the patient's chronic kidney  disease.  Her last echocardiogram was done approximately 8 months ago and was noted to have a mild systolic dysfunction and an ejection fraction of 40%.     2.  Coronary artery disease: Patient with a history of coronary artery disease with currently on isosorbide mononitrate and metoprolol.  She is not on aspirin or statin for an unclear reason.  He's not an ACE inhibitor due to renal dysfunction.     3.  Dyslipidemia: Patient with a history of dyslipidemia with no recent lipid panel record.     4.  Chronic kidney disease: Patient with history of chronic kidney disease who was recently followed by Dr. Ellis.  This is likely contributing to the patient's volume overload.  Would consider nephrology consult.     5.  Symptomatic bradycardia: Patient with a history of symptomatic bradycardia status post pacemaker placement.  He is currently being ventricularly paced rhythm her pacemaker appears to be functioning properly.    6.  Hypertension: Patient with mild hypertension current regimen.     Plan:     1.  Congestive heart failure:  the patient appears to be doing better today.  The patient has been transferred to IV diuretics by nephrology.  She continues to present with fairly high urine output but her kidney function appears to be improving so this point would continue.  Continue on BiDil equivalent and beta blocker.     2.  Coronary artery disease: Patient with a history of coronary artery disease with does not appear to be in acute event.   Continue on aspirin, statin and beta blocker.    3.  Dyslipidemia: Patient with history of dyslipidemia.   lipid panel done this admission shows adequate control.    4.  Hypertension: The patient is once again hypertensive today.  We'll increase hydralazine dose to 50 mg by mouth 3 times a day and monitor for improvement.      Nba Arora MD  08/02/17  4:17 PM    Dragon disclaimer:  Much of this encounter note is an electronic transcription/translation of  spoken language to printed text. The electronic translation of spoken language may permit erroneous, or at times, nonsensical words or phrases to be inadvertently transcribed; Although I have reviewed the note for such errors, some may still exist.

## 2017-08-03 ENCOUNTER — EPISODE CHANGES (OUTPATIENT)
Dept: CASE MANAGEMENT | Facility: OTHER | Age: 82
End: 2017-08-03

## 2017-08-03 ENCOUNTER — APPOINTMENT (OUTPATIENT)
Dept: GENERAL RADIOLOGY | Facility: HOSPITAL | Age: 82
End: 2017-08-03

## 2017-08-03 LAB
ANION GAP SERPL CALCULATED.3IONS-SCNC: 8.8 MMOL/L (ref 3.6–11.2)
ANISOCYTOSIS BLD QL: NORMAL
BASOPHILS # BLD AUTO: 0.04 10*3/MM3 (ref 0–0.3)
BASOPHILS NFR BLD AUTO: 0.8 % (ref 0–2)
BUN BLD-MCNC: 21 MG/DL (ref 7–21)
BUN/CREAT SERPL: 12.3 (ref 7–25)
CALCIUM SPEC-SCNC: 9.8 MG/DL (ref 7.7–10)
CHLORIDE SERPL-SCNC: 99 MMOL/L (ref 99–112)
CO2 SERPL-SCNC: 32.2 MMOL/L (ref 24.3–31.9)
CREAT BLD-MCNC: 1.71 MG/DL (ref 0.43–1.29)
DEPRECATED RDW RBC AUTO: 65.9 FL (ref 37–54)
EOSINOPHIL # BLD AUTO: 0.36 10*3/MM3 (ref 0–0.7)
EOSINOPHIL NFR BLD AUTO: 7.5 % (ref 0–7)
ERYTHROCYTE [DISTWIDTH] IN BLOOD BY AUTOMATED COUNT: 22.1 % (ref 11.5–14.5)
GFR SERPL CREATININE-BSD FRML MDRD: 28 ML/MIN/1.73
GLUCOSE BLD-MCNC: 99 MG/DL (ref 70–110)
HCT VFR BLD AUTO: 33.2 % (ref 37–47)
HGB BLD-MCNC: 10.6 G/DL (ref 12–16)
HYPOCHROMIA BLD QL: NORMAL
IMM GRANULOCYTES # BLD: 0 10*3/MM3 (ref 0–0.03)
IMM GRANULOCYTES NFR BLD: 0 % (ref 0–0.5)
INR PPP: 3.43 (ref 0.9–1.1)
LARGE PLATELETS: NORMAL
LYMPHOCYTES # BLD AUTO: 0.83 10*3/MM3 (ref 1–3)
LYMPHOCYTES NFR BLD AUTO: 17.3 % (ref 16–46)
MCH RBC QN AUTO: 27.1 PG (ref 27–33)
MCHC RBC AUTO-ENTMCNC: 31.9 G/DL (ref 33–37)
MCV RBC AUTO: 84.9 FL (ref 80–94)
MONOCYTES # BLD AUTO: 0.46 10*3/MM3 (ref 0.1–0.9)
MONOCYTES NFR BLD AUTO: 9.6 % (ref 0–12)
NEUTROPHILS # BLD AUTO: 3.12 10*3/MM3 (ref 1.4–6.5)
NEUTROPHILS NFR BLD AUTO: 64.8 % (ref 40–75)
OSMOLALITY SERPL CALC.SUM OF ELEC: 282.4 MOSM/KG (ref 273–305)
PLATELET # BLD AUTO: 181 10*3/MM3 (ref 130–400)
PMV BLD AUTO: 10.8 FL (ref 6–10)
POTASSIUM BLD-SCNC: 3.8 MMOL/L (ref 3.5–5.3)
PROTHROMBIN TIME: 35.1 SECONDS (ref 11–15.4)
RBC # BLD AUTO: 3.91 10*6/MM3 (ref 4.2–5.4)
SODIUM BLD-SCNC: 140 MMOL/L (ref 135–153)
WBC NRBC COR # BLD: 4.81 10*3/MM3 (ref 4.5–12.5)

## 2017-08-03 PROCEDURE — 97116 GAIT TRAINING THERAPY: CPT

## 2017-08-03 PROCEDURE — 94799 UNLISTED PULMONARY SVC/PX: CPT

## 2017-08-03 PROCEDURE — 80048 BASIC METABOLIC PNL TOTAL CA: CPT | Performed by: FAMILY MEDICINE

## 2017-08-03 PROCEDURE — 85007 BL SMEAR W/DIFF WBC COUNT: CPT | Performed by: FAMILY MEDICINE

## 2017-08-03 PROCEDURE — 85025 COMPLETE CBC W/AUTO DIFF WBC: CPT | Performed by: FAMILY MEDICINE

## 2017-08-03 PROCEDURE — 97530 THERAPEUTIC ACTIVITIES: CPT

## 2017-08-03 PROCEDURE — 85610 PROTHROMBIN TIME: CPT | Performed by: FAMILY MEDICINE

## 2017-08-03 PROCEDURE — 71010 XR CHEST 1 VW: CPT | Performed by: RADIOLOGY

## 2017-08-03 PROCEDURE — 71010 HC CHEST PA OR AP: CPT

## 2017-08-03 PROCEDURE — 25010000002 FUROSEMIDE PER 20 MG: Performed by: INTERNAL MEDICINE

## 2017-08-03 PROCEDURE — 99232 SBSQ HOSP IP/OBS MODERATE 35: CPT | Performed by: INTERNAL MEDICINE

## 2017-08-03 RX ORDER — WARFARIN SODIUM 2 MG/1
2 TABLET ORAL
Status: COMPLETED | OUTPATIENT
Start: 2017-08-03 | End: 2017-08-03

## 2017-08-03 RX ORDER — HYDRALAZINE HYDROCHLORIDE 50 MG/1
100 TABLET, FILM COATED ORAL EVERY 8 HOURS SCHEDULED
Status: DISCONTINUED | OUTPATIENT
Start: 2017-08-03 | End: 2017-08-05 | Stop reason: HOSPADM

## 2017-08-03 RX ORDER — FUROSEMIDE 40 MG/1
40 TABLET ORAL DAILY
Status: DISCONTINUED | OUTPATIENT
Start: 2017-08-04 | End: 2017-08-05 | Stop reason: HOSPADM

## 2017-08-03 RX ADMIN — ALBUTEROL SULFATE 2.5 MG: 2.5 SOLUTION RESPIRATORY (INHALATION) at 12:28

## 2017-08-03 RX ADMIN — CITALOPRAM HYDROBROMIDE 20 MG: 20 TABLET ORAL at 07:51

## 2017-08-03 RX ADMIN — METOPROLOL SUCCINATE 50 MG: 50 TABLET, FILM COATED, EXTENDED RELEASE ORAL at 07:51

## 2017-08-03 RX ADMIN — CHOLECALCIFEROL CAP 125 MCG (5000 UNIT) 5000 UNITS: 125 CAP at 07:50

## 2017-08-03 RX ADMIN — ALBUTEROL SULFATE 2.5 MG: 2.5 SOLUTION RESPIRATORY (INHALATION) at 19:07

## 2017-08-03 RX ADMIN — ALPRAZOLAM 0.5 MG: 0.5 TABLET ORAL at 07:50

## 2017-08-03 RX ADMIN — ALBUTEROL SULFATE 2.5 MG: 2.5 SOLUTION RESPIRATORY (INHALATION) at 07:06

## 2017-08-03 RX ADMIN — HYDRALAZINE HYDROCHLORIDE 50 MG: 50 TABLET ORAL at 05:44

## 2017-08-03 RX ADMIN — METOPROLOL SUCCINATE 50 MG: 50 TABLET, FILM COATED, EXTENDED RELEASE ORAL at 20:37

## 2017-08-03 RX ADMIN — FUROSEMIDE 40 MG: 10 INJECTION, SOLUTION INTRAMUSCULAR; INTRAVENOUS at 07:51

## 2017-08-03 RX ADMIN — DOCUSATE SODIUM 100 MG: 100 CAPSULE, LIQUID FILLED ORAL at 17:32

## 2017-08-03 RX ADMIN — ALPRAZOLAM 0.5 MG: 0.5 TABLET ORAL at 20:37

## 2017-08-03 RX ADMIN — WARFARIN SODIUM 2 MG: 2 TABLET ORAL at 17:32

## 2017-08-03 RX ADMIN — PANTOPRAZOLE SODIUM 40 MG: 40 TABLET, DELAYED RELEASE ORAL at 05:44

## 2017-08-03 RX ADMIN — ATORVASTATIN CALCIUM 40 MG: 40 TABLET, FILM COATED ORAL at 20:37

## 2017-08-03 RX ADMIN — DOCUSATE SODIUM 100 MG: 100 CAPSULE, LIQUID FILLED ORAL at 07:51

## 2017-08-03 RX ADMIN — ASPIRIN 81 MG: 81 TABLET ORAL at 07:51

## 2017-08-03 RX ADMIN — ISOSORBIDE MONONITRATE 60 MG: 60 TABLET, EXTENDED RELEASE ORAL at 07:51

## 2017-08-03 RX ADMIN — HYDRALAZINE HYDROCHLORIDE 100 MG: 50 TABLET ORAL at 15:38

## 2017-08-03 NOTE — PROGRESS NOTES
Nephrology  Note      Subjective     She is feeling much better. She states her breathing is back to baseline. She denies chest pain. She has no other complaints    Objective     Vital Signs  Temp:  [97.5 °F (36.4 °C)-98 °F (36.7 °C)] 97.5 °F (36.4 °C)  Heart Rate:  [75-88] 80  Resp:  [18-20] 20  BP: (128-161)/(70-91) 161/91    I/O this shift:  In: -   Out: 400 [Urine:400]  I/O last 3 completed shifts:  In: 720 [P.O.:720]  Out: 5275 [Urine:4675; Stool:600]    Physical Examination:    General Appearance : alert,  no distress  Head : normocephalic  Eyes :  no pallor  Throat : oral mucosa moist  Neck: no JVD  Lungs : no crackles  Heart : regular rhythm & normal rate, normal S1, S2, no murmur   Abdomen : soft non-tender  Extremities :  trace edema   Skin : no rash  Neurologic :  grossly no focal deficitis    Laboratory Data :      WBC WBC   Date Value Ref Range Status   08/03/2017 4.81 4.50 - 12.50 10*3/mm3 Final   08/02/2017 5.57 4.50 - 12.50 10*3/mm3 Final   08/01/2017 5.78 4.50 - 12.50 10*3/mm3 Final      HGB Hemoglobin   Date Value Ref Range Status   08/03/2017 10.6 (L) 12.0 - 16.0 g/dL Final   08/02/2017 10.8 (L) 12.0 - 16.0 g/dL Final   08/01/2017 11.9 (L) 12.0 - 16.0 g/dL Final      HCT Hematocrit   Date Value Ref Range Status   08/03/2017 33.2 (L) 37.0 - 47.0 % Final   08/02/2017 34.3 (L) 37.0 - 47.0 % Final   08/01/2017 38.3 37.0 - 47.0 % Final      Platlets No results found for: LABPLAT   MCV MCV   Date Value Ref Range Status   08/03/2017 84.9 80.0 - 94.0 fL Final   08/02/2017 84.1 80.0 - 94.0 fL Final   08/01/2017 85.7 80.0 - 94.0 fL Final          Sodium Sodium   Date Value Ref Range Status   08/03/2017 140 135 - 153 mmol/L Final   08/02/2017 142 135 - 153 mmol/L Final   08/01/2017 140 135 - 153 mmol/L Final      Potassium Potassium   Date Value Ref Range Status   08/03/2017 3.8 3.5 - 5.3 mmol/L Final     Comment:     1+ Hemolysis    08/02/2017 3.4 (L) 3.5 - 5.3 mmol/L Final   08/01/2017 3.8 3.5 - 5.3 mmol/L  Final      Chloride Chloride   Date Value Ref Range Status   08/03/2017 99 99 - 112 mmol/L Final   08/02/2017 100 99 - 112 mmol/L Final   08/01/2017 102 99 - 112 mmol/L Final      CO2 CO2   Date Value Ref Range Status   08/03/2017 32.2 (H) 24.3 - 31.9 mmol/L Final   08/02/2017 31.4 24.3 - 31.9 mmol/L Final   08/01/2017 30.7 24.3 - 31.9 mmol/L Final      BUN BUN   Date Value Ref Range Status   08/03/2017 21 7 - 21 mg/dL Final   08/02/2017 25 (H) 7 - 21 mg/dL Final   08/01/2017 27 (H) 7 - 21 mg/dL Final      Creatinine Creatinine   Date Value Ref Range Status   08/03/2017 1.71 (H) 0.43 - 1.29 mg/dL Final   08/02/2017 1.56 (H) 0.43 - 1.29 mg/dL Final   08/01/2017 1.87 (H) 0.43 - 1.29 mg/dL Final      Calcium Calcium   Date Value Ref Range Status   08/03/2017 9.8 7.7 - 10.0 mg/dL Final   08/02/2017 9.5 7.7 - 10.0 mg/dL Final   08/01/2017 9.8 7.7 - 10.0 mg/dL Final      PO4 No results found for: CAPO4   Albumin No results found for: ALBUMIN   Magnesium No results found for: MG   Uric Acid No results found for: URICACID     Radiology results :     Imaging Results (last 24 hours)     Procedure Component Value Units Date/Time    XR Chest 1 View [[id number]] Collected:  08/03/17 0757     Updated:  08/03/17 0800    Narrative:       Technique: Frontal view the chest.     COMPARISON:  8/1/2017     INDICATION:     chf      FINDINGS:    Bibasilar atelectasis. Cardiomegaly is noted. Prominent  interstitial markings are noted. There are small bilateral pleural  effusions.        Impression:       Radiographic changes of congestive failure.     This report was finalized on 8/3/2017 7:58 AM by Dr. Ion Del Rosario MD.                 Medications:        albuterol 2.5 mg Nebulization Q6H - RT   ALPRAZolam 0.5 mg Oral BID   aspirin 81 mg Oral Daily   atorvastatin 40 mg Oral Nightly   citalopram 20 mg Oral Daily   docusate sodium 100 mg Oral BID   [START ON 8/4/2017] furosemide 40 mg Oral Daily   hydrALAZINE 100 mg Oral Q8H   isosorbide  mononitrate 60 mg Oral Daily   metoprolol succinate XL 50 mg Oral Q12H   pantoprazole 40 mg Oral Q AM   vitamin D3 5,000 Units Oral Daily   warfarin 2 mg Oral Once       Pharmacy to dose warfarin        Assessment/Plan     Active Problems:    Right heart failure    Heart failure, chronic, right-sided      1. 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 Creatinine is up from 1.5 to 1.7 today but still at her baseline. I will change lasix to 40 mg QD starting in AM. She is also developing slight contraction alkalosis    2. Systolic CHF exacerbation :  CXR obtained and reviewed personally today. Pulmonary edema appears improved. She has lost 16 lbs with diuresis    3. HTN : stable    4. Hypokalemia : resolved    I discussed the patients findings and my recommendations with patient and nursing staff    Sebas Ellis MD  08/03/17  1:42 PM

## 2017-08-03 NOTE — THERAPY TREATMENT NOTE
Acute Care - Physical Therapy Treatment Note   [place]     Patient Name: Tre Kinney  : 1932  MRN: [id number]  Today's Date: 8/3/2017     Date of Referral to PT: 17  Referring Physician: Dr. Barone    Admit Date: 2017    Visit Dx:  No diagnosis found.  Patient Active Problem List   Diagnosis   • Acid reflux   • Ankle arthralgia   • Cardiac conduction disorder   • Closed fracture of distal fibula   • Arteriosclerosis of coronary artery   • Fracture of distal end of tibia   • Elevated cholesterol   • BP (high blood pressure)   • Hypertrophic polyarthritis   • OP (osteoporosis)   • Right heart failure   • Heart failure, chronic, right-sided               Adult Rehabilitation Note       17 1622 17 1000       Rehab Assessment/Intervention    Discipline physical therapist  -BC physical therapist  -AD     Document Type therapy note (daily note)  -BC therapy note (daily note)  -AD     Subjective Information agree to therapy  -BC agree to therapy  -AD     Patient Effort, Rehab Treatment good  -BC good  -AD     Precautions/Limitations  fall precautions;oxygen therapy device and L/min   3L  -AD     Patient Response to Treatment  Pt tolerated treatment session well with rest breaks provided as needed.  -AD     Recorded by [BC] Vandana Pike, PT [AD] Ashley Claudene Dalton, PT     Pain Assessment    Pain Assessment No/denies pain  -BC No/denies pain  -AD     Recorded by [BC] Vandana Pike, PT [AD] Ashley Claudene Dalton, PT     Cognitive Assessment/Intervention    Current Cognitive/Communication Assessment functional  -BC functional  -AD     Orientation Status oriented x 4  -BC oriented x 4  -AD     Follows Commands/Answers Questions 100% of the time  -% of the time;able to follow single-step instructions;needs cueing  -AD     Personal Safety  decreased awareness, need for assist;decreased awareness, need for safety  -AD     Personal Safety Interventions gait belt;nonskid  shoes/slippers when out of bed  -BC fall prevention program maintained;gait belt;nonskid shoes/slippers when out of bed  -AD     Recorded by [BC] Vandana Pike, PT [AD] Ashley Claudene Dalton, PT     Bed Mobility, Assessment/Treatment    Bed Mobility, Assistive Device bed rails  -BC bed rails  -AD     Bed Mob, Supine to Sit, [place] minimum assist (75% patient effort)  -BC minimum assist (75% patient effort)  -AD     Bed Mobility, Safety Issues decreased use of arms for pushing/pulling;decreased use of legs for bridging/pushing  -BC decreased use of arms for pushing/pulling;decreased use of legs for bridging/pushing  -AD     Bed Mobility, Impairments strength decreased  -BC strength decreased  -AD     Recorded by [BC] Vandana Pike, PT [AD] Ashley Claudene Dalton, PT     Transfer Assessment/Treatment    Transfers, Sit-Stand [place] minimum assist (75% patient effort)  -BC minimum assist (75% patient effort)  -AD     Transfers, Stand-Sit [place] minimum assist (75% patient effort)  -BC minimum assist (75% patient effort)  -AD     Transfers, Sit-Stand-Sit, Assist Device rolling walker  -BC rolling walker  -AD     Toilet Transfer, [place] minimum assist (75% patient effort)   Bedside commode  -BC minimum assist (75% patient effort)   Bedside commode  -AD     Toilet Transfer, Assistive Device rolling walker  -BC rolling walker  -AD     Transfer, Safety Issues balance decreased during turns  -BC balance decreased during turns  -AD     Transfer, Impairments strength decreased;impaired balance  -BC strength decreased;impaired balance  -AD     Recorded by [BC] Vandana Pike, PT [AD] Ashley Claudene Dalton, PT     Gait Assessment/Treatment    Gait, [place] Level minimum assist (75% patient effort)  -BC minimum assist (75% patient effort)  -AD     Gait, Assistive Device rolling walker  -BC rolling walker  -AD     Gait, Distance (Feet) 100  -  -AD     Gait, Gait Pattern Analysis  swing-through gait  -BC swing-through gait  -AD     Gait, Gait Deviations  calvin decreased  -AD     Gait, Safety Issues  step length decreased  -AD     Gait, Impairments strength decreased  -BC strength decreased  -AD     Recorded by [BC] Vandana Pike, PT [AD] Ashley Claudene Dalton, PT     Toileting Assessment/Training    Toileting Assess/Train, Assistive Device  bedside commode  -AD     Toileting Assess/Train, Position  sitting  -AD     Toileting Assess/Train, Indepen Level  minimum assist (75% patient effort)  -AD     Toileting Assess/Train, Impairments  strength decreased;impaired balance  -AD     Recorded by  [AD] Ashley Claudene Dalton, PT     Therapy Exercises    Bilateral Lower Extremities AROM:;10 reps;sitting  -BC AROM:;10 reps;sitting  -AD     Recorded by [BC] Vandana Pike, PT [AD] Ashley Claudene Dalton, PT     Positioning and Restraints    Pre-Treatment Position in bed  -BC in bed  -AD     Post Treatment Position chair  -BC wheelchair  -AD     In Chair notified nsg;sitting;call light within reach;encouraged to call for assist  -BC sitting;call light within reach;encouraged to call for assist;with nsg   3L O2  -AD     Recorded by [BC] Vandana Pike, PT [AD] Ashley Claudene Dalton, PT       User Key  (r) = Recorded By, (t) = Taken By, (c) = Cosigned By    Initials Name Effective Dates    AD Ashley Claudene Dalton, PT 02/04/16 -     BC Vandana Pike, PT 03/14/16 -                 IP PT Goals       08/01/17 1453          Bed Mobility PT LTG    Bed Mobility PT LTG, Date Established 08/01/17  -AD      Bed Mobility PT LTG, Time to Achieve by discharge  -AD      Bed Mobility PT LTG, Activity Type supine to sit/sit to supine  -AD      Bed Mobility PT LTG, [place] Level contact guard assist;verbal cues required  -AD      Bed Mobility PT Goal  LTG, Assist Device bed rails  -AD      Transfer Training PT LTG    Transfer Training PT LTG, Date Established 08/01/17  -AD      Transfer Training PT  LTG, Time to Achieve by discharge  -AD      Transfer Training PT LTG, Activity Type bed to chair /chair to bed;sit to stand/stand to sit  -AD      Transfer Training PT LTG, [place] Level minimum assist (75% patient effort)  -AD      Transfer Training PT LTG, Assist Device walker, rolling  -AD      Gait Training PT LTG    Gait Training Goal PT LTG, Date Established 08/01/17  -AD      Gait Training Goal PT LTG, Time to Achieve by discharge  -AD      Gait Training Goal PT LTG, [place] Level minimum assist (75% patient effort)  -AD      Gait Training Goal PT LTG, Assist Device walker, rolling  -AD      Gait Training Goal PT LTG, Distance to Achieve 150  -AD        User Key  (r) = Recorded By, (t) = Taken By, (c) = Cosigned By    Initials Name Provider Type    AD Ashley Claudene Dalton, PT Physical Therapist          Physical Therapy Education     Title: PT OT SLP Therapies (Done)     Topic: Physical Therapy (Done)     Point: Mobility training (Done)    Learning Progress Summary    Learner Readiness Method Response Comment Documented by Status   Patient Acceptance E VU  BC 08/03/17 1632 Done    Acceptance E VU  SS 08/02/17 1912 Done    Acceptance E VU  TONY 08/02/17 1239 Done    Acceptance E VU  AD 08/02/17 1032 Done    Acceptance E VU  AD 08/01/17 1452 Done    Acceptance E VU  TONY 08/01/17 1110 Done    Acceptance E VU  SN 07/29/17 2031 Done    Acceptance E VU  KM 07/28/17 2053 Done   Family Acceptance E VU  KM 07/28/17 2053 Done               Point: Home exercise program (Done)    Learning Progress Summary    Learner Readiness Method Response Comment Documented by Status   Patient Acceptance E VU  BC 08/03/17 1632 Done    Acceptance E VU  SS 08/02/17 1912 Done    Acceptance E VU  TONY 08/02/17 1239 Done    Acceptance E VU  AD 08/02/17 1032 Done    Acceptance E VU  AD 08/01/17 1452 Done    Acceptance E VU  TONY 08/01/17 1110 Done    Acceptance E VU  SN 07/29/17 2031 Done    Acceptance E VU  KM 07/28/17 2053 Done    Family Acceptance E VU  KM 07/28/17 2053 Done               Point: Body mechanics (Done)    Learning Progress Summary    Learner Readiness Method Response Comment Documented by Status   Patient Acceptance E VU  BC 08/03/17 1632 Done    Acceptance E VU  SS 08/02/17 1912 Done    Acceptance E VU  TONY 08/02/17 1239 Done    Acceptance E VU  AD 08/02/17 1032 Done    Acceptance E VU  AD 08/01/17 1452 Done    Acceptance E VU  TONY 08/01/17 1110 Done    Acceptance E VU  SN 07/29/17 2031 Done    Acceptance E VU  KM 07/28/17 2053 Done   Family Acceptance E VU  KM 07/28/17 2053 Done               Point: Precautions (Done)    Learning Progress Summary    Learner Readiness Method Response Comment Documented by Status   Patient Acceptance E VU  BC 08/03/17 1632 Done    Acceptance E VU  SS 08/02/17 1912 Done    Acceptance E VU  TONY 08/02/17 1239 Done    Acceptance E VU  AD 08/02/17 1032 Done    Acceptance E VU  AD 08/01/17 1452 Done    Acceptance E VU  TONY 08/01/17 1110 Done    Acceptance E VU  SN 07/29/17 2031 Done    Acceptance E VU  KM 07/28/17 2053 Done   Family Acceptance E VU  KM 07/28/17 2053 Done                      User Key     Initials Effective Dates Name Provider Type Discipline    AD 02/04/16 -  Ashley Claudene Dalton, PT Physical Therapist PT     06/16/16 -  Ricky Savage, RN Registered Nurse Nurse     06/16/16 -  Genoveva Garcia, RN Registered Nurse Nurse     11/08/16 -  Chetna Mujica, RN Registered Nurse Nurse    BC 03/14/16 -  Vandana Pike, PT Physical Therapist PT     11/18/16 -  Kelsie Carrillo, RN Registered Nurse Nurse                    PT Recommendation and Plan  Planned Therapy Interventions: balance training, bed mobility training, gait training, home exercise program, neuromuscular re-education, patient/family education, postural re-education, ROM (Range of Motion), strengthening, stretching, transfer training  PT Frequency: 3-5 times/wk, per priority policy             Outcome  Measures       08/03/17 1600 08/02/17 1000 08/01/17 1400    How much help from another person do you currently need...    Turning from your back to your side while in flat bed without using bedrails? 3  -BC 3  -AD 3  -AD    Moving from lying on back to sitting on the side of a flat bed without bedrails? 3  -BC 3  -AD 3  -AD    Moving to and from a bed to a chair (including a wheelchair)? 3  -BC 3  -AD 3  -AD    Standing up from a chair using your arms (e.g., wheelchair, bedside chair)? 3  -BC 3  -AD 3  -AD    Climbing 3-5 steps with a railing? 2  -BC 2  -AD 2  -AD    To walk in hospital room? 3  -BC 3  -AD 3  -AD    AM-PAC 6 Clicks Score 17  -BC 17  -AD 17  -AD    Functional Assessment    Outcome Measure Options AM-PAC 6 Clicks Basic Mobility (PT)  -BC AM-PAC 6 Clicks Basic Mobility (PT)  -AD AM-PAC 6 Clicks Basic Mobility (PT)  -AD      User Key  (r) = Recorded By, (t) = Taken By, (c) = Cosigned By    Initials Name Provider Type    AD Ashley Claudene Dalton, PT Physical Therapist    BC Vandana Pike, PT Physical Therapist           Time Calculation:         PT Charges       08/03/17 1635          Time Calculation    Start Time --   30  -BC      PT Received On 08/03/17  -BC        User Key  (r) = Recorded By, (t) = Taken By, (c) = Cosigned By    Initials Name Provider Type    BC Vandana Pike PT Physical Therapist          Therapy Charges for Today     Code Description Service Date Service Provider Modifiers Qty    70608222111 HC GAIT TRAINING EA 15 MIN 8/3/2017 Vandana Pike, PT GP 1    49401890999 HC PT THERAPEUTIC ACT EA 15 MIN 8/3/2017 Vandana Pike, PT GP 1    79016407133 HC PT THER SUPP EA 15 MIN 8/3/2017 Vandana Pike PT GP 2          PT G-Codes  PT Professional Judgement Used?: Yes  Outcome Measure Options: AM-PAC 6 Clicks Basic Mobility (PT)  Functional Limitation: Mobility: Walking and moving around  Mobility: Walking and Moving Around Current Status (): At least 60 percent but less  than 80 percent impaired, limited or restricted  Mobility: Walking and Moving Around Goal Status (): At least 60 percent but less than 80 percent impaired, limited or restricted    Vandana Pike, PT  8/3/2017

## 2017-08-03 NOTE — PROGRESS NOTES
Tre Kinney 84 y.o.   08/03/17    Subjective: Feeling better with a little less short of breath.  She sit up and ambulated a little yesterday  Objective: Loss unstable chest clear well with few crackles in bases  Vital Signs (last 72 hrs)       07/30 0700  -  07/31 0659 07/31 0700  -  08/01 0659 08/01 0700  -  08/02 0659 08/02 0700  -  08/03 0640   Most Recent    Temp (°F) 97.1 -  98.1    97.6 -  98.1    97.5 -  98.3    97.8 -  98.2     98 (36.7)    Heart Rate 79 -  81    78 -  82    75 -  84    80 -  88     80    Resp 18 -  22    18 -  22    18 -  23      20     20    /63 -  146/76    118/68 -  150/83    130/79 -  152/93    128/70 -  158/80     128/70    SpO2 (%) 92 -  98    91 -  95    90 -  97    91 -  98     98        Lab Results (last 72 hours)     Procedure Component Value Units Date/Time    Protime-INR [[id number]]  (Abnormal) Collected:  08/01/17 0535    Specimen:  Blood Updated:  08/01/17 0603     Protime 23.5 (H) Seconds       Note new Reference Range        INR 2.06 (H)    Narrative:       Suggested INR therapeutic range for stable oral anticoagulant therapy:    Low Intensity therapy:   1.5-2.0  Moderate Intensity therapy:   2.0-3.0  High Intensity therapy:   2.5-4.0    CBC & Differential [[id number]] Collected:  08/01/17 0535    Specimen:  Blood Updated:  08/01/17 0616    Narrative:       The following orders were created for panel order CBC & Differential.  Procedure                               Abnormality         Status                     ---------                               -----------         ------                     CBC Auto Differential[[id number]]        Abnormal            Final result                 Please view results for these tests on the individual orders.    CBC Auto Differential [[id number]]  (Abnormal) Collected:  08/01/17 0535    Specimen:  Blood Updated:  08/01/17 0616     WBC 5.78 10*3/mm3      RBC 4.47 10*6/mm3      Hemoglobin 11.9 (L) g/dL      Hematocrit 38.3 %      MCV  85.7 fL      MCH 26.6 (L) pg      MCHC 31.1 (L) g/dL      RDW 22.0 (H) %      RDW-SD 65.6 (H) fl      MPV 10.2 (H) fL      Platelets 195 10*3/mm3      Neutrophil % 64.8 %      Lymphocyte % 19.9 %      Monocyte % 6.7 %      Eosinophil % 8.1 (H) %      Basophil % 0.5 %      Immature Grans % 0.0 %      Neutrophils, Absolute 3.74 10*3/mm3      Lymphocytes, Absolute 1.15 10*3/mm3      Monocytes, Absolute 0.39 10*3/mm3      Eosinophils, Absolute 0.47 10*3/mm3      Basophils, Absolute 0.03 10*3/mm3      Immature Grans, Absolute 0.00 10*3/mm3     Basic Metabolic Panel [[id number]]  (Abnormal) Collected:  08/01/17 0535    Specimen:  Blood Updated:  08/01/17 0624     Glucose 106 mg/dL      BUN 27 (H) mg/dL      Creatinine 1.87 (H) mg/dL      Sodium 140 mmol/L      Potassium 3.8 mmol/L      Chloride 102 mmol/L      CO2 30.7 mmol/L      Calcium 9.8 mg/dL      eGFR Non African Amer 26 (L) mL/min/1.73      BUN/Creatinine Ratio 14.4     Anion Gap 7.3 mmol/L     Narrative:       The MDRD GFR formula is only valid for adults with stable renal function between ages 18 and 70.    Osmolality, Calculated [[id number]]  (Normal) Collected:  08/01/17 0535    Specimen:  Blood Updated:  08/01/17 0624     Osmolality Calc 284.9 mOsm/kg     BNP [[id number]]  (Abnormal) Collected:  08/01/17 0535    Specimen:  Blood Updated:  08/01/17 0645     BNP 1707.0 (H) pg/mL     Blood Gas, Arterial [[id number]]  (Abnormal) Collected:  08/01/17 0733    Specimen:  Arterial Blood Updated:  08/01/17 0753     Site Arterial: left brachial     Devang's Test Positive     pH, Arterial 7.410 pH units      pCO2, Arterial 49.5 (H) mm Hg      pO2, Arterial 67.4 (L) mm Hg      HCO3, Arterial 30.7 (C) mmol/L      Base Excess, Arterial 5.1 mmol/L      O2 Saturation, Arterial 93.2 %      Hemoglobin, Blood Gas 12.3 g/dL      Hematocrit, Blood Gas 36.0 (L) %      Oxyhemoglobin 91.8 %      Methemoglobin 0.3 %      Carboxyhemoglobin 1.2 %      A-a Gradiant 92.9 mmHg      Temperature  98.6 C      Barometric Pressure for Blood Gas 729 mmHg      Modality Cannula - Nasal     FIO2 32 %     Protime-INR [[id number]]  (Abnormal) Collected:  08/02/17 0654    Specimen:  Blood Updated:  08/02/17 0740     Protime 30.8 (H) Seconds       Note new Reference Range        INR 2.90 (H)    Narrative:       Suggested INR therapeutic range for stable oral anticoagulant therapy:    Low Intensity therapy:   1.5-2.0  Moderate Intensity therapy:   2.0-3.0  High Intensity therapy:   2.5-4.0    CBC & Differential [[id number]] Collected:  08/02/17 0654    Specimen:  Blood Updated:  08/02/17 0749    Narrative:       The following orders were created for panel order CBC & Differential.  Procedure                               Abnormality         Status                     ---------                               -----------         ------                     CBC Auto Differential[[id number]]        Abnormal            Final result                 Please view results for these tests on the individual orders.    CBC Auto Differential [[id number]]  (Abnormal) Collected:  08/02/17 0654    Specimen:  Blood Updated:  08/02/17 0749     WBC 5.57 10*3/mm3      RBC 4.08 (L) 10*6/mm3      Hemoglobin 10.8 (L) g/dL      Hematocrit 34.3 (L) %      MCV 84.1 fL      MCH 26.5 (L) pg      MCHC 31.5 (L) g/dL      RDW 21.6 (H) %      RDW-SD 64.0 (H) fl      MPV 10.0 fL      Platelets 187 10*3/mm3      Neutrophil % 68.9 %      Lymphocyte % 17.8 %      Monocyte % 7.4 %      Eosinophil % 5.2 %      Basophil % 0.7 %      Immature Grans % 0.0 %      Neutrophils, Absolute 3.84 10*3/mm3      Lymphocytes, Absolute 0.99 (L) 10*3/mm3      Monocytes, Absolute 0.41 10*3/mm3      Eosinophils, Absolute 0.29 10*3/mm3      Basophils, Absolute 0.04 10*3/mm3      Immature Grans, Absolute 0.00 10*3/mm3     BNP [[id number]]  (Abnormal) Collected:  08/02/17 0654    Specimen:  Blood Updated:  08/02/17 0758     BNP 2181.0 (H) pg/mL     Basic Metabolic Panel [[id number]]   (Abnormal) Collected:  08/02/17 0654    Specimen:  Blood Updated:  08/02/17 0802     Glucose 104 mg/dL      BUN 25 (H) mg/dL      Creatinine 1.56 (H) mg/dL      Sodium 142 mmol/L      Potassium 3.4 (L) mmol/L      Chloride 100 mmol/L      CO2 31.4 mmol/L      Calcium 9.5 mg/dL      eGFR Non African Amer 32 (L) mL/min/1.73      BUN/Creatinine Ratio 16.0     Anion Gap 10.6 mmol/L     Narrative:       The MDRD GFR formula is only valid for adults with stable renal function between ages 18 and 70.    Osmolality, Calculated [[id number]]  (Normal) Collected:  08/02/17 0654    Specimen:  Blood Updated:  08/02/17 0802     Osmolality Calc 287.8 mOsm/kg     Basic Metabolic Panel [[id number]]  (Abnormal) Collected:  08/03/17 0223    Specimen:  Blood Updated:  08/03/17 0256     Glucose 99 mg/dL      BUN 21 mg/dL      Creatinine 1.71 (H) mg/dL      Sodium 140 mmol/L      Potassium 3.8 mmol/L       1+ Hemolysis         Chloride 99 mmol/L      CO2 32.2 (H) mmol/L      Calcium 9.8 mg/dL      eGFR Non African Amer 28 (L) mL/min/1.73      BUN/Creatinine Ratio 12.3     Anion Gap 8.8 mmol/L     Narrative:       The MDRD GFR formula is only valid for adults with stable renal function between ages 18 and 70.    Osmolality, Calculated [[id number]]  (Normal) Collected:  08/03/17 0223    Specimen:  Blood Updated:  08/03/17 0257     Osmolality Calc 282.4 mOsm/kg     CBC & Differential [[id number]] Collected:  08/03/17 0458    Specimen:  Blood Updated:  08/03/17 0543    Narrative:       The following orders were created for panel order CBC & Differential.  Procedure                               Abnormality         Status                     ---------                               -----------         ------                     CBC Auto Differential[[id number]]                            In process                   Please view results for these tests on the individual orders.    CBC Auto Differential [[id number]] Collected:  08/03/17 0458     Specimen:  Blood Updated:  08/03/17 0543    Protime-INR [[id number]]  (Abnormal) Collected:  08/03/17 0458    Specimen:  Blood Updated:  08/03/17 0557     Protime 35.1 (H) Seconds       Note new Reference Range        INR 3.43 (H)    Narrative:       Suggested INR therapeutic range for stable oral anticoagulant therapy:    Low Intensity therapy:   1.5-2.0  Moderate Intensity therapy:   2.0-3.0  High Intensity therapy:   2.5-4.0        Imaging Results (last 24 hours)     ** No results found for the last 24 hours. **        Assessment:Active Problems:    Right heart failure    Heart failure, chronic, right-sided       Plan: Continue IV diuresis and continued monitoring renal function and continue physical therapy

## 2017-08-03 NOTE — PROGRESS NOTES
LOS: 6 days   Patient Care Team:  Myles Barone MD as PCP - General  Myles Barone MD as PCP - Family Medicine  Myles Barone MD as PCP - Claims Attributed  Kelli Márquez RN as Care Coordinator (Population Health)      Subjective     Admission information:    The patient is an 84-year-old white female patient of Dr. Grady in [place] with a history of hypertension, chronic kidney disease, coronary artery disease status post stent placement, symptomatic bradycardia status post pacemaker placement, dyslipidemia and previous history of DVT who comes to the hospital for progressive shortness of breath.  According to the patient she has a long history of lower extremity edema, volume overload and shortness of breath.  She states the current episode started approximately one week ago with worsening shortness of breath.  She reports her shortness of breath progressively worsening.  She denies any ameliorating or exacerbating factors.  She states the intensity was severe.  Due to the progressive nature of her symptoms and the fact that she has been hospitalized multiple times in the recent past she decided to come to the hospital for further evaluation.  She was admitted to the medicine service for further evaluation and cardiology was consulted.    Interval History:     The patient states she is doing much better today.  She was able to ambulate without any problems.  She denies any chest pain, shortness breath or palpitations.    History taken from: patient chart    Vital Signs  Temp:  [97.5 °F (36.4 °C)-98 °F (36.7 °C)] 97.5 °F (36.4 °C)  Heart Rate:  [75-88] 80  Resp:  [18-20] 20  BP: (128-161)/(70-91) 161/91    Physical Exam:     Physical Exam   Constitutional: She is oriented to person, place, and time. She appears well-developed and well-nourished.   Elderly white female laying comfortably on bed.   HENT:   Mouth/Throat: Oropharynx is clear and moist.   Eyes: EOM are normal. Pupils are equal,  round, and reactive to light.   Neck: Neck supple. No JVD present. No tracheal deviation present. No thyromegaly present.   Cardiovascular: Normal rate, regular rhythm, S1 normal and S2 normal.  Exam reveals no gallop and no friction rub.    No murmur heard.  Pulmonary/Chest: Effort normal. No respiratory distress. She has no wheezes. She has no rales.   Abdominal: Soft. Bowel sounds are normal. She exhibits no mass. There is no tenderness.   Musculoskeletal: Normal range of motion. She exhibits edema (Mild.).   Lymphadenopathy:     She has no cervical adenopathy.   Neurological: She is alert and oriented to person, place, and time.   Skin: Skin is warm and dry. No rash noted.   Psychiatric: She has a normal mood and affect.       Results Review:     I reviewed the patient's new clinical results.  I reviewed the patient's new imaging results and agree with the interpretation.  I reviewed the patient's other test results and agree with the interpretation  I personally viewed and interpreted the patient's EKG/Telemetry data    Medication:  Scheduled Meds:    albuterol 2.5 mg Nebulization Q6H - RT   ALPRAZolam 0.5 mg Oral BID   aspirin 81 mg Oral Daily   atorvastatin 40 mg Oral Nightly   citalopram 20 mg Oral Daily   docusate sodium 100 mg Oral BID   furosemide 40 mg Intravenous Q12H   hydrALAZINE 50 mg Oral Q8H   isosorbide mononitrate 60 mg Oral Daily   metoprolol succinate XL 50 mg Oral Q12H   pantoprazole 40 mg Oral Q AM   vitamin D3 5,000 Units Oral Daily   warfarin 2 mg Oral Once     Continuous Infusions:    Pharmacy to dose warfarin      PRN Meds:.aluminum-magnesium hydroxide-simethicone  •  HYDROcodone-acetaminophen  •  Pharmacy to dose warfarin  •  sodium chloride    Telemetry:Ventricular pacing in the 80s.      Assessment/Plan     1.  Congestive heart failure: Patient with an episode of congestive heart failure likely worsened by the patient's chronic kidney disease.  Her last echocardiogram was done  approximately 8 months ago and was noted to have a mild systolic dysfunction and an ejection fraction of 40%.     2.  Coronary artery disease: Patient with a history of coronary artery disease with currently on isosorbide mononitrate and metoprolol.  She is not on aspirin or statin for an unclear reason.  He's not an ACE inhibitor due to renal dysfunction.     3.  Dyslipidemia: Patient with a history of dyslipidemia with no recent lipid panel record.     4.  Chronic kidney disease: Patient with history of chronic kidney disease who was recently followed by Dr. Ellis.  This is likely contributing to the patient's volume overload.  Would consider nephrology consult.     5.  Symptomatic bradycardia: Patient with a history of symptomatic bradycardia status post pacemaker placement.  He is currently being ventricularly paced rhythm her pacemaker appears to be functioning properly.    6.  Hypertension: Patient with mild hypertension current regimen.     Plan:     1.  Congestive heart failure:  the patient's appears to be a better today.  She is mildly volume overloaded with bilateral lower extremity edema.  However her kidney function appears to be worsening at this point.  We'll consider decreasing diuretic dose.  Will defer to nephrology on this.     2.  Coronary artery disease: Patient with a history of coronary artery disease with does not appear to be in acute event.   Continue on aspirin, statin and beta blocker.    3.  Hypertension: The patient is once again hypertensive today.  We'll increase hydralazine dose to 100 mg by mouth 3 times a day and monitor for improvement.      Nba Arora MD  08/03/17  1:02 PM    Dragon disclaimer:  Much of this encounter note is an electronic transcription/translation of spoken language to printed text. The electronic translation of spoken language may permit erroneous, or at times, nonsensical words or phrases to be inadvertently transcribed; Although I have  reviewed the note for such errors, some may still exist.

## 2017-08-04 LAB
ANION GAP SERPL CALCULATED.3IONS-SCNC: 6.7 MMOL/L (ref 3.6–11.2)
BASOPHILS # BLD AUTO: 0.05 10*3/MM3 (ref 0–0.3)
BASOPHILS NFR BLD AUTO: 1 % (ref 0–2)
BUN BLD-MCNC: 24 MG/DL (ref 7–21)
BUN/CREAT SERPL: 13.8 (ref 7–25)
CALCIUM SPEC-SCNC: 9.4 MG/DL (ref 7.7–10)
CHLORIDE SERPL-SCNC: 98 MMOL/L (ref 99–112)
CO2 SERPL-SCNC: 33.3 MMOL/L (ref 24.3–31.9)
CREAT BLD-MCNC: 1.74 MG/DL (ref 0.43–1.29)
DEPRECATED RDW RBC AUTO: 66 FL (ref 37–54)
EOSINOPHIL # BLD AUTO: 0.45 10*3/MM3 (ref 0–0.7)
EOSINOPHIL NFR BLD AUTO: 9.1 % (ref 0–7)
ERYTHROCYTE [DISTWIDTH] IN BLOOD BY AUTOMATED COUNT: 22.2 % (ref 11.5–14.5)
GFR SERPL CREATININE-BSD FRML MDRD: 28 ML/MIN/1.73
GLUCOSE BLD-MCNC: 99 MG/DL (ref 70–110)
HCT VFR BLD AUTO: 33.7 % (ref 37–47)
HGB BLD-MCNC: 10.4 G/DL (ref 12–16)
IMM GRANULOCYTES # BLD: 0.01 10*3/MM3 (ref 0–0.03)
IMM GRANULOCYTES NFR BLD: 0.2 % (ref 0–0.5)
INR PPP: 3.14 (ref 0.9–1.1)
LYMPHOCYTES # BLD AUTO: 1.1 10*3/MM3 (ref 1–3)
LYMPHOCYTES NFR BLD AUTO: 22.2 % (ref 16–46)
MCH RBC QN AUTO: 26.3 PG (ref 27–33)
MCHC RBC AUTO-ENTMCNC: 30.9 G/DL (ref 33–37)
MCV RBC AUTO: 85.1 FL (ref 80–94)
MONOCYTES # BLD AUTO: 0.51 10*3/MM3 (ref 0.1–0.9)
MONOCYTES NFR BLD AUTO: 10.3 % (ref 0–12)
NEUTROPHILS # BLD AUTO: 2.84 10*3/MM3 (ref 1.4–6.5)
NEUTROPHILS NFR BLD AUTO: 57.2 % (ref 40–75)
OSMOLALITY SERPL CALC.SUM OF ELEC: 279.8 MOSM/KG (ref 273–305)
PLATELET # BLD AUTO: 187 10*3/MM3 (ref 130–400)
PMV BLD AUTO: 10.7 FL (ref 6–10)
POTASSIUM BLD-SCNC: 3.5 MMOL/L (ref 3.5–5.3)
PROTHROMBIN TIME: 32.7 SECONDS (ref 11–15.4)
RBC # BLD AUTO: 3.96 10*6/MM3 (ref 4.2–5.4)
SODIUM BLD-SCNC: 138 MMOL/L (ref 135–153)
WBC NRBC COR # BLD: 4.96 10*3/MM3 (ref 4.5–12.5)

## 2017-08-04 PROCEDURE — 85610 PROTHROMBIN TIME: CPT | Performed by: FAMILY MEDICINE

## 2017-08-04 PROCEDURE — 85025 COMPLETE CBC W/AUTO DIFF WBC: CPT | Performed by: FAMILY MEDICINE

## 2017-08-04 PROCEDURE — 97110 THERAPEUTIC EXERCISES: CPT

## 2017-08-04 PROCEDURE — 97116 GAIT TRAINING THERAPY: CPT

## 2017-08-04 PROCEDURE — 99232 SBSQ HOSP IP/OBS MODERATE 35: CPT | Performed by: INTERNAL MEDICINE

## 2017-08-04 PROCEDURE — 94799 UNLISTED PULMONARY SVC/PX: CPT

## 2017-08-04 PROCEDURE — 80048 BASIC METABOLIC PNL TOTAL CA: CPT | Performed by: FAMILY MEDICINE

## 2017-08-04 RX ORDER — WARFARIN SODIUM 3 MG/1
3 TABLET ORAL
Status: DISCONTINUED | OUTPATIENT
Start: 2017-08-04 | End: 2017-08-05

## 2017-08-04 RX ADMIN — PANTOPRAZOLE SODIUM 40 MG: 40 TABLET, DELAYED RELEASE ORAL at 05:29

## 2017-08-04 RX ADMIN — DOCUSATE SODIUM 100 MG: 100 CAPSULE, LIQUID FILLED ORAL at 10:11

## 2017-08-04 RX ADMIN — CHOLECALCIFEROL CAP 125 MCG (5000 UNIT) 5000 UNITS: 125 CAP at 10:11

## 2017-08-04 RX ADMIN — ATORVASTATIN CALCIUM 40 MG: 40 TABLET, FILM COATED ORAL at 21:33

## 2017-08-04 RX ADMIN — HYDRALAZINE HYDROCHLORIDE 100 MG: 50 TABLET ORAL at 05:29

## 2017-08-04 RX ADMIN — ISOSORBIDE MONONITRATE 60 MG: 60 TABLET, EXTENDED RELEASE ORAL at 10:11

## 2017-08-04 RX ADMIN — ALPRAZOLAM 0.5 MG: 0.5 TABLET ORAL at 10:11

## 2017-08-04 RX ADMIN — DOCUSATE SODIUM 100 MG: 100 CAPSULE, LIQUID FILLED ORAL at 17:44

## 2017-08-04 RX ADMIN — METOPROLOL SUCCINATE 50 MG: 50 TABLET, FILM COATED, EXTENDED RELEASE ORAL at 21:33

## 2017-08-04 RX ADMIN — ALBUTEROL SULFATE 2.5 MG: 2.5 SOLUTION RESPIRATORY (INHALATION) at 13:33

## 2017-08-04 RX ADMIN — ALPRAZOLAM 0.5 MG: 0.5 TABLET ORAL at 21:33

## 2017-08-04 RX ADMIN — ALBUTEROL SULFATE 2.5 MG: 2.5 SOLUTION RESPIRATORY (INHALATION) at 19:06

## 2017-08-04 RX ADMIN — ALBUTEROL SULFATE 2.5 MG: 2.5 SOLUTION RESPIRATORY (INHALATION) at 06:55

## 2017-08-04 RX ADMIN — CITALOPRAM HYDROBROMIDE 20 MG: 20 TABLET ORAL at 10:11

## 2017-08-04 RX ADMIN — FUROSEMIDE 40 MG: 40 TABLET ORAL at 10:12

## 2017-08-04 RX ADMIN — METOPROLOL SUCCINATE 50 MG: 50 TABLET, FILM COATED, EXTENDED RELEASE ORAL at 10:12

## 2017-08-04 RX ADMIN — HYDRALAZINE HYDROCHLORIDE 100 MG: 50 TABLET ORAL at 21:33

## 2017-08-04 RX ADMIN — HYDRALAZINE HYDROCHLORIDE 100 MG: 50 TABLET ORAL at 14:56

## 2017-08-04 RX ADMIN — ASPIRIN 81 MG: 81 TABLET ORAL at 10:11

## 2017-08-04 NOTE — PROGRESS NOTES
LOS: 7 days   Patient Care Team:  Myles Barone MD as PCP - General  Myles Barone MD as PCP - Family Medicine  Myles Barone MD as PCP - Claims Attributed  Kelli Márquez RN as Care Coordinator (Population Health)      Subjective     Admission information:    The patient is an 84-year-old white female patient of Dr. Grady in [place] with a history of hypertension, chronic kidney disease, coronary artery disease status post stent placement, symptomatic bradycardia status post pacemaker placement, dyslipidemia and previous history of DVT who comes to the hospital for progressive shortness of breath.  According to the patient she has a long history of lower extremity edema, volume overload and shortness of breath.  She states the current episode started approximately one week ago with worsening shortness of breath.  She reports her shortness of breath progressively worsening.  She denies any ameliorating or exacerbating factors.  She states the intensity was severe.  Due to the progressive nature of her symptoms and the fact that she has been hospitalized multiple times in the recent past she decided to come to the hospital for further evaluation.  She was admitted to the medicine service for further evaluation and cardiology was consulted.    Interval History:     The patient says her breathing is about the same as yesterday but appears close to baseline at this point.  She denies any chest pain or palpitations.    History taken from: patient chart    Vital Signs  Temp:  [97.5 °F (36.4 °C)-98 °F (36.7 °C)] 97.7 °F (36.5 °C)  Heart Rate:  [73-81] 81  Resp:  [18-20] 18  BP: (127-161)/(55-91) 139/73    Physical Exam:     Physical Exam   Constitutional: She is oriented to person, place, and time. She appears well-developed and well-nourished.   Elderly white female laying comfortably on bed.   HENT:   Mouth/Throat: Oropharynx is clear and moist.   Eyes: EOM are normal. Pupils are equal, round, and  reactive to light.   Neck: Neck supple. No JVD present. No tracheal deviation present. No thyromegaly present.   Cardiovascular: Normal rate, regular rhythm, S1 normal and S2 normal.  Exam reveals no gallop and no friction rub.    No murmur heard.  Pulmonary/Chest: Effort normal. No respiratory distress. She has no wheezes. She has no rales.   Abdominal: Soft. Bowel sounds are normal. She exhibits no mass. There is no tenderness.   Musculoskeletal: Normal range of motion. She exhibits edema (Mild.).   Lymphadenopathy:     She has no cervical adenopathy.   Neurological: She is alert and oriented to person, place, and time.   Skin: Skin is warm and dry. No rash noted.   Psychiatric: She has a normal mood and affect.       Results Review:     I reviewed the patient's new clinical results.  I reviewed the patient's new imaging results and agree with the interpretation.  I reviewed the patient's other test results and agree with the interpretation  I personally viewed and interpreted the patient's EKG/Telemetry data    Medication:  Scheduled Meds:    albuterol 2.5 mg Nebulization Q6H - RT   ALPRAZolam 0.5 mg Oral BID   aspirin 81 mg Oral Daily   atorvastatin 40 mg Oral Nightly   citalopram 20 mg Oral Daily   docusate sodium 100 mg Oral BID   furosemide 40 mg Oral Daily   hydrALAZINE 100 mg Oral Q8H   isosorbide mononitrate 60 mg Oral Daily   metoprolol succinate XL 50 mg Oral Q12H   pantoprazole 40 mg Oral Q AM   vitamin D3 5,000 Units Oral Daily     Continuous Infusions:    Pharmacy to dose warfarin      PRN Meds:.aluminum-magnesium hydroxide-simethicone  •  HYDROcodone-acetaminophen  •  Pharmacy to dose warfarin  •  sodium chloride    Telemetry:Ventricular pacing in the 80s.      Assessment/Plan     1.  Congestive heart failure: Patient with an episode of congestive heart failure likely worsened by the patient's chronic kidney disease.  Her last echocardiogram was done approximately 8 months ago and was noted to have a  mild systolic dysfunction and an ejection fraction of 40%.     2.  Coronary artery disease: Patient with a history of coronary artery disease with currently on isosorbide mononitrate and metoprolol.  She is not on aspirin or statin for an unclear reason.  He's not an ACE inhibitor due to renal dysfunction.     3.  Dyslipidemia: Patient with a history of dyslipidemia with no recent lipid panel record.     4.  Chronic kidney disease: Patient with history of chronic kidney disease who was recently followed by Dr. Ellis.  This is likely contributing to the patient's volume overload.  Would consider nephrology consult.     5.  Symptomatic bradycardia: Patient with a history of symptomatic bradycardia status post pacemaker placement.  He is currently being ventricularly paced rhythm her pacemaker appears to be functioning properly.    6.  Hypertension: Patient with mild hypertension current regimen.     Plan:     1.  Congestive heart failure:  the patient's appears to be a better today.  She is mildly volume overloaded with bilateral lower extremity edema.  Nephrology has adjusted her diuretic dose at this point.     2.  Coronary artery disease: Patient with a history of coronary artery disease with does not appear to be in acute event.   Continue on aspirin, statin and beta blocker.    3.  Hypertension: The patient appears to be well-controlled on current regimen.  At this point would continue.    Disposition: Patient appears to be doing better today.  Okay to discharge home if okay by nephrology.    Nba Arora MD  08/04/17  9:56 AM    Dragon disclaimer:  Much of this encounter note is an electronic transcription/translation of spoken language to printed text. The electronic translation of spoken language may permit erroneous, or at times, nonsensical words or phrases to be inadvertently transcribed; Although I have reviewed the note for such errors, some may still exist.

## 2017-08-04 NOTE — PROGRESS NOTES
Discharge Planning Assessment   [place]     Patient Name: Tre Kinney  MRN: [id number]  Today's Date: 8/4/2017    Admit Date: 7/27/2017          Discharge Needs Assessment     None            Discharge Plan       08/04/17 1241    Case Management/Social Work Plan    Plan Pt admitted on 7/27/17.  Pt lives at home with Son and Grandson and plans to return home at discharge.  Pt currently utilizes home 02, hospital bed and walker via unknown provider.  Pt currently utilizes [facility].  SS will follow and assist with discharge needs.    Patient/Family In Agreement With Plan yes        Discharge Placement     No information found        Expected Discharge Date and Time     Expected Discharge Date Expected Discharge Time    Aug 3, 2017               Demographic Summary     None            Functional Status     None            Psychosocial     None            Abuse/Neglect     None            Legal     None            Substance Abuse     None            Patient Forms     None          Mary Salgado

## 2017-08-04 NOTE — PROGRESS NOTES
Tre Kinney 84 y.o.   08/04/17    Subjective: Patient doing better though with decreased shortness of breath and ambulating better  Objective: Vital signs stable chest with crackles in bases  Vital Signs (last 72 hrs)       07/31 0700  -  08/01 0659 08/01 0700  -  08/02 0659 08/02 0700  -  08/03 0659 08/03 0700  -  08/04 0647   Most Recent    Temp (°F) 97.6 -  98.1    97.5 -  98.3    97.5 -  98.2    97.5 -  98     97.7 (36.5)    Heart Rate 78 -  82    75 -  84    80 -  88    73 -  81     81    Resp 18 -  22    18 -  23    18 -  20    18 -  20     18    /68 -  150/83    130/79 -  152/93    128/70 -  158/80    127/55 -  161/91     133/76    SpO2 (%) 91 -  95    90 -  97    91 -  98    95 -  99     98        Lab Results (last 72 hours)     Procedure Component Value Units Date/Time    Blood Gas, Arterial [[id number]]  (Abnormal) Collected:  08/01/17 0733    Specimen:  Arterial Blood Updated:  08/01/17 0753     Site Arterial: left brachial     Devang's Test Positive     pH, Arterial 7.410 pH units      pCO2, Arterial 49.5 (H) mm Hg      pO2, Arterial 67.4 (L) mm Hg      HCO3, Arterial 30.7 (C) mmol/L      Base Excess, Arterial 5.1 mmol/L      O2 Saturation, Arterial 93.2 %      Hemoglobin, Blood Gas 12.3 g/dL      Hematocrit, Blood Gas 36.0 (L) %      Oxyhemoglobin 91.8 %      Methemoglobin 0.3 %      Carboxyhemoglobin 1.2 %      A-a Gradiant 92.9 mmHg      Temperature 98.6 C      Barometric Pressure for Blood Gas 729 mmHg      Modality Cannula - Nasal     FIO2 32 %     Protime-INR [[id number]]  (Abnormal) Collected:  08/02/17 0654    Specimen:  Blood Updated:  08/02/17 0740     Protime 30.8 (H) Seconds       Note new Reference Range        INR 2.90 (H)    Narrative:       Suggested INR therapeutic range for stable oral anticoagulant therapy:    Low Intensity therapy:   1.5-2.0  Moderate Intensity therapy:   2.0-3.0  High Intensity therapy:   2.5-4.0    CBC & Differential [[id number]] Collected:  08/02/17 0654     Specimen:  Blood Updated:  08/02/17 0749    Narrative:       The following orders were created for panel order CBC & Differential.  Procedure                               Abnormality         Status                     ---------                               -----------         ------                     CBC Auto Differential[[id number]]        Abnormal            Final result                 Please view results for these tests on the individual orders.    CBC Auto Differential [[id number]]  (Abnormal) Collected:  08/02/17 0654    Specimen:  Blood Updated:  08/02/17 0749     WBC 5.57 10*3/mm3      RBC 4.08 (L) 10*6/mm3      Hemoglobin 10.8 (L) g/dL      Hematocrit 34.3 (L) %      MCV 84.1 fL      MCH 26.5 (L) pg      MCHC 31.5 (L) g/dL      RDW 21.6 (H) %      RDW-SD 64.0 (H) fl      MPV 10.0 fL      Platelets 187 10*3/mm3      Neutrophil % 68.9 %      Lymphocyte % 17.8 %      Monocyte % 7.4 %      Eosinophil % 5.2 %      Basophil % 0.7 %      Immature Grans % 0.0 %      Neutrophils, Absolute 3.84 10*3/mm3      Lymphocytes, Absolute 0.99 (L) 10*3/mm3      Monocytes, Absolute 0.41 10*3/mm3      Eosinophils, Absolute 0.29 10*3/mm3      Basophils, Absolute 0.04 10*3/mm3      Immature Grans, Absolute 0.00 10*3/mm3     BNP [[id number]]  (Abnormal) Collected:  08/02/17 0654    Specimen:  Blood Updated:  08/02/17 0758     BNP 2181.0 (H) pg/mL     Basic Metabolic Panel [[id number]]  (Abnormal) Collected:  08/02/17 0654    Specimen:  Blood Updated:  08/02/17 0802     Glucose 104 mg/dL      BUN 25 (H) mg/dL      Creatinine 1.56 (H) mg/dL      Sodium 142 mmol/L      Potassium 3.4 (L) mmol/L      Chloride 100 mmol/L      CO2 31.4 mmol/L      Calcium 9.5 mg/dL      eGFR Non African Amer 32 (L) mL/min/1.73      BUN/Creatinine Ratio 16.0     Anion Gap 10.6 mmol/L     Narrative:       The MDRD GFR formula is only valid for adults with stable renal function between ages 18 and 70.    Osmolality, Calculated [[id number]]  (Normal)  Collected:  08/02/17 0654    Specimen:  Blood Updated:  08/02/17 0802     Osmolality Calc 287.8 mOsm/kg     Basic Metabolic Panel [[id number]]  (Abnormal) Collected:  08/03/17 0223    Specimen:  Blood Updated:  08/03/17 0256     Glucose 99 mg/dL      BUN 21 mg/dL      Creatinine 1.71 (H) mg/dL      Sodium 140 mmol/L      Potassium 3.8 mmol/L       1+ Hemolysis         Chloride 99 mmol/L      CO2 32.2 (H) mmol/L      Calcium 9.8 mg/dL      eGFR Non African Amer 28 (L) mL/min/1.73      BUN/Creatinine Ratio 12.3     Anion Gap 8.8 mmol/L     Narrative:       The MDRD GFR formula is only valid for adults with stable renal function between ages 18 and 70.    Osmolality, Calculated [[id number]]  (Normal) Collected:  08/03/17 0223    Specimen:  Blood Updated:  08/03/17 0257     Osmolality Calc 282.4 mOsm/kg     Protime-INR [[id number]]  (Abnormal) Collected:  08/03/17 0458    Specimen:  Blood Updated:  08/03/17 0557     Protime 35.1 (H) Seconds       Note new Reference Range        INR 3.43 (H)    Narrative:       Suggested INR therapeutic range for stable oral anticoagulant therapy:    Low Intensity therapy:   1.5-2.0  Moderate Intensity therapy:   2.0-3.0  High Intensity therapy:   2.5-4.0    CBC & Differential [[id number]] Collected:  08/03/17 0458    Specimen:  Blood Updated:  08/03/17 0650    Narrative:       The following orders were created for panel order CBC & Differential.  Procedure                               Abnormality         Status                     ---------                               -----------         ------                     Scan Slide[[id number]]                                       Final result               CBC Auto Differential[[id number]]        Abnormal            Final result                 Please view results for these tests on the individual orders.    CBC Auto Differential [[id number]]  (Abnormal) Collected:  08/03/17 0458    Specimen:  Blood Updated:  08/03/17 0650     WBC 4.81 10*3/mm3       RBC 3.91 (L) 10*6/mm3      Hemoglobin 10.6 (L) g/dL      Hematocrit 33.2 (L) %      MCV 84.9 fL      MCH 27.1 pg      MCHC 31.9 (L) g/dL      RDW 22.1 (H) %      RDW-SD 65.9 (H) fl      MPV 10.8 (H) fL      Platelets 181 10*3/mm3      Neutrophil % 64.8 %      Lymphocyte % 17.3 %      Monocyte % 9.6 %      Eosinophil % 7.5 (H) %      Basophil % 0.8 %      Immature Grans % 0.0 %      Neutrophils, Absolute 3.12 10*3/mm3      Lymphocytes, Absolute 0.83 (L) 10*3/mm3      Monocytes, Absolute 0.46 10*3/mm3      Eosinophils, Absolute 0.36 10*3/mm3      Basophils, Absolute 0.04 10*3/mm3      Immature Grans, Absolute 0.00 10*3/mm3     Scan Slide [[id number]] Collected:  08/03/17 0458    Specimen:  Blood Updated:  08/03/17 0650     Anisocytosis Large/3+     Hypochromia Slight/1+     Large Platelets Slight/1+    Protime-INR [[id number]]  (Abnormal) Collected:  08/04/17 0116    Specimen:  Blood Updated:  08/04/17 0150     Protime 32.7 (H) Seconds       Note new Reference Range        INR 3.14 (H)    Narrative:       Suggested INR therapeutic range for stable oral anticoagulant therapy:    Low Intensity therapy:   1.5-2.0  Moderate Intensity therapy:   2.0-3.0  High Intensity therapy:   2.5-4.0    Basic Metabolic Panel [[id number]]  (Abnormal) Collected:  08/04/17 0116    Specimen:  Blood Updated:  08/04/17 0200     Glucose 99 mg/dL      BUN 24 (H) mg/dL      Creatinine 1.74 (H) mg/dL      Sodium 138 mmol/L      Potassium 3.5 mmol/L      Chloride 98 (L) mmol/L      CO2 33.3 (H) mmol/L      Calcium 9.4 mg/dL      eGFR Non African Amer 28 (L) mL/min/1.73      BUN/Creatinine Ratio 13.8     Anion Gap 6.7 mmol/L     Narrative:       The MDRD GFR formula is only valid for adults with stable renal function between ages 18 and 70.    Osmolality, Calculated [[id number]]  (Normal) Collected:  08/04/17 0116    Specimen:  Blood Updated:  08/04/17 0200     Osmolality Calc 279.8 mOsm/kg     CBC & Differential [[id number]] Collected:  08/04/17  0116    Specimen:  Blood Updated:  08/04/17 0211    Narrative:       The following orders were created for panel order CBC & Differential.  Procedure                               Abnormality         Status                     ---------                               -----------         ------                     CBC Auto Differential[[id number]]        Abnormal            Final result                 Please view results for these tests on the individual orders.    CBC Auto Differential [[id number]]  (Abnormal) Collected:  08/04/17 0116    Specimen:  Blood Updated:  08/04/17 0211     WBC 4.96 10*3/mm3      RBC 3.96 (L) 10*6/mm3      Hemoglobin 10.4 (L) g/dL      Hematocrit 33.7 (L) %      MCV 85.1 fL      MCH 26.3 (L) pg      MCHC 30.9 (L) g/dL      RDW 22.2 (H) %      RDW-SD 66.0 (H) fl      MPV 10.7 (H) fL      Platelets 187 10*3/mm3      Neutrophil % 57.2 %      Lymphocyte % 22.2 %      Monocyte % 10.3 %      Eosinophil % 9.1 (H) %      Basophil % 1.0 %      Immature Grans % 0.2 %      Neutrophils, Absolute 2.84 10*3/mm3      Lymphocytes, Absolute 1.10 10*3/mm3      Monocytes, Absolute 0.51 10*3/mm3      Eosinophils, Absolute 0.45 10*3/mm3      Basophils, Absolute 0.05 10*3/mm3      Immature Grans, Absolute 0.01 10*3/mm3         Imaging Results (last 24 hours)     Procedure Component Value Units Date/Time    XR Chest 1 View [[id number]] Collected:  08/03/17 0757     Updated:  08/03/17 0800    Narrative:       Technique: Frontal view the chest.     COMPARISON:  8/1/2017     INDICATION:     chf      FINDINGS:    Bibasilar atelectasis. Cardiomegaly is noted. Prominent  interstitial markings are noted. There are small bilateral pleural  effusions.        Impression:       Radiographic changes of congestive failure.     This report was finalized on 8/3/2017 7:58 AM by Dr. Ion Del Rosario MD.           Assessment:Active Problems:    Right heart failure    Heart failure, chronic, right-sided   Chronic kidney disease  Plan:  Continue to diurese and monitor electrolytes.  Probably discharge by Monday

## 2017-08-04 NOTE — PROGRESS NOTES
Nephrology  Note      Subjective     She states her breathing is much better. She ambulated yesterday without issues  She has no chest pain    Objective     Vital Signs  Temp:  [97.6 °F (36.4 °C)-98 °F (36.7 °C)] 97.7 °F (36.5 °C)  Heart Rate:  [73-81] 77  Resp:  [18-20] 18  BP: (127-154)/(55-86) 143/79       I/O last 3 completed shifts:  In: 480 [P.O.:480]  Out: 2700 [Urine:2700]    Physical Examination:    General Appearance : alert,  no distress  Head : normocephalic  Eyes :  no pallor  Throat : oral mucosa moist  Neck: no JVD  Lungs : no crackles  Heart : regular rhythm & normal rate, normal S1, S2, no murmur   Abdomen : soft non-tender  Extremities :  trace edema   Skin : no rash  Neurologic :  grossly no focal deficitis    Laboratory Data :      WBC WBC   Date Value Ref Range Status   08/04/2017 4.96 4.50 - 12.50 10*3/mm3 Final   08/03/2017 4.81 4.50 - 12.50 10*3/mm3 Final   08/02/2017 5.57 4.50 - 12.50 10*3/mm3 Final      HGB Hemoglobin   Date Value Ref Range Status   08/04/2017 10.4 (L) 12.0 - 16.0 g/dL Final   08/03/2017 10.6 (L) 12.0 - 16.0 g/dL Final   08/02/2017 10.8 (L) 12.0 - 16.0 g/dL Final      HCT Hematocrit   Date Value Ref Range Status   08/04/2017 33.7 (L) 37.0 - 47.0 % Final   08/03/2017 33.2 (L) 37.0 - 47.0 % Final   08/02/2017 34.3 (L) 37.0 - 47.0 % Final      Platlets No results found for: LABPLAT   MCV MCV   Date Value Ref Range Status   08/04/2017 85.1 80.0 - 94.0 fL Final   08/03/2017 84.9 80.0 - 94.0 fL Final   08/02/2017 84.1 80.0 - 94.0 fL Final          Sodium Sodium   Date Value Ref Range Status   08/04/2017 138 135 - 153 mmol/L Final   08/03/2017 140 135 - 153 mmol/L Final   08/02/2017 142 135 - 153 mmol/L Final      Potassium Potassium   Date Value Ref Range Status   08/04/2017 3.5 3.5 - 5.3 mmol/L Final   08/03/2017 3.8 3.5 - 5.3 mmol/L Final     Comment:     1+ Hemolysis    08/02/2017 3.4 (L) 3.5 - 5.3 mmol/L Final      Chloride Chloride   Date Value Ref Range Status   08/04/2017  98 (L) 99 - 112 mmol/L Final   08/03/2017 99 99 - 112 mmol/L Final   08/02/2017 100 99 - 112 mmol/L Final      CO2 CO2   Date Value Ref Range Status   08/04/2017 33.3 (H) 24.3 - 31.9 mmol/L Final   08/03/2017 32.2 (H) 24.3 - 31.9 mmol/L Final   08/02/2017 31.4 24.3 - 31.9 mmol/L Final      BUN BUN   Date Value Ref Range Status   08/04/2017 24 (H) 7 - 21 mg/dL Final   08/03/2017 21 7 - 21 mg/dL Final   08/02/2017 25 (H) 7 - 21 mg/dL Final      Creatinine Creatinine   Date Value Ref Range Status   08/04/2017 1.74 (H) 0.43 - 1.29 mg/dL Final   08/03/2017 1.71 (H) 0.43 - 1.29 mg/dL Final   08/02/2017 1.56 (H) 0.43 - 1.29 mg/dL Final      Calcium Calcium   Date Value Ref Range Status   08/04/2017 9.4 7.7 - 10.0 mg/dL Final   08/03/2017 9.8 7.7 - 10.0 mg/dL Final   08/02/2017 9.5 7.7 - 10.0 mg/dL Final      PO4 No results found for: CAPO4   Albumin No results found for: ALBUMIN   Magnesium No results found for: MG   Uric Acid No results found for: URICACID     Radiology results :     Imaging Results (last 24 hours)     ** No results found for the last 24 hours. **              Medications:        albuterol 2.5 mg Nebulization Q6H - RT   ALPRAZolam 0.5 mg Oral BID   aspirin 81 mg Oral Daily   atorvastatin 40 mg Oral Nightly   citalopram 20 mg Oral Daily   docusate sodium 100 mg Oral BID   furosemide 40 mg Oral Daily   hydrALAZINE 100 mg Oral Q8H   isosorbide mononitrate 60 mg Oral Daily   metoprolol succinate XL 50 mg Oral Q12H   pantoprazole 40 mg Oral Q AM   vitamin D3 5,000 Units Oral Daily   warfarin 3 mg Oral Once       Pharmacy to dose warfarin        Assessment/Plan     Active Problems:    Right heart failure    Heart failure, chronic, right-sided      1. 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 Creatinine is stable at 1.7 today . I will continue lasix 40 mg QD    2. Systolic CHF exacerbation :  Resolved. Continues to loose fluid weight but I am not sure if today's weight is  accurate as it is 162 lbs from 181 lbs    3. HTN : stable    4. Hypokalemia : resolved    Ok to dc from renal standpoint. Fu in 4 weeks with BMP. Will sign off  I discussed the patients findings and my recommendations with patient and nursing staff    Sebas Ellis MD  08/04/17  11:48 AM

## 2017-08-04 NOTE — DISCHARGE INSTR - APPOINTMENTS
MICHELE 9/1/17 12 :15  And  Will make  An  Apt  With  Dr Barone in  1 week  And  Dr samson  In  2 weeks    And  Call  Pt  With  apt

## 2017-08-04 NOTE — THERAPY TREATMENT NOTE
Acute Care - Physical Therapy Treatment Note   [place]     Patient Name: Ter Kinney  : 1932  MRN: [id number]  Today's Date: 2017     Date of Referral to PT: 17  Referring Physician: Dr. Barone    Admit Date: 2017    Visit Dx:  No diagnosis found.  Patient Active Problem List   Diagnosis   • Acid reflux   • Ankle arthralgia   • Cardiac conduction disorder   • Closed fracture of distal fibula   • Arteriosclerosis of coronary artery   • Fracture of distal end of tibia   • Elevated cholesterol   • BP (high blood pressure)   • Hypertrophic polyarthritis   • OP (osteoporosis)   • Right heart failure   • Heart failure, chronic, right-sided               Adult Rehabilitation Note       17 1112 17 1622 17 1000    Rehab Assessment/Intervention    Discipline physical therapist  -CT physical therapist  -BC physical therapist  -AD    Document Type therapy note (daily note)  -CT therapy note (daily note)  -BC therapy note (daily note)  -AD    Subjective Information agree to therapy  -CT agree to therapy  -BC agree to therapy  -AD    Patient Effort, Rehab Treatment good  -CT good  -BC good  -AD    Precautions/Limitations fall precautions;oxygen therapy device and L/min   3L  -CT  fall precautions;oxygen therapy device and L/min   3L  -AD    Patient Response to Treatment Pt tolerated treatment session well with rest breaks provided as needed.   -CT  Pt tolerated treatment session well with rest breaks provided as needed.  -AD    Recorded by [CT] Bhargavi Vásquez, PT [BC] Vandana Pike, PT [AD] Ashley Claudene Dalton, PT    Pain Assessment    Pain Assessment  No/denies pain  -BC No/denies pain  -AD    Recorded by  [BC] Vandana Pike, PT [AD] Ashley Claudene Dalton, PT    Cognitive Assessment/Intervention    Current Cognitive/Communication Assessment functional  -CT functional  -BC functional  -AD    Orientation Status oriented x 4  -CT oriented x 4  -BC oriented x 4  -AD    Follows  Commands/Answers Questions  100% of the time  -% of the time;able to follow single-step instructions;needs cueing  -AD    Personal Safety   decreased awareness, need for assist;decreased awareness, need for safety  -AD    Personal Safety Interventions fall prevention program maintained;gait belt;muscle strengthening facilitated;nonskid shoes/slippers when out of bed  -CT gait belt;nonskid shoes/slippers when out of bed  -BC fall prevention program maintained;gait belt;nonskid shoes/slippers when out of bed  -AD    Recorded by [CT] Bhargavi Vásquez, PT [BC] Vandana Pike, PT [AD] Ashley Claudene Dalton, PT    Bed Mobility, Assessment/Treatment    Bed Mobility, Assistive Device bed rails  -CT bed rails  -BC bed rails  -AD    Bed Mob, Supine to Sit, [place] minimum assist (75% patient effort)  -CT minimum assist (75% patient effort)  -BC minimum assist (75% patient effort)  -AD    Bed Mobility, Safety Issues decreased use of arms for pushing/pulling;decreased use of legs for bridging/pushing  -CT decreased use of arms for pushing/pulling;decreased use of legs for bridging/pushing  -BC decreased use of arms for pushing/pulling;decreased use of legs for bridging/pushing  -AD    Bed Mobility, Impairments strength decreased  -CT strength decreased  -BC strength decreased  -AD    Recorded by [CT] Bhargavi Vásquez, PT [BC] Vandana Pike, PT [AD] Ashley Claudene Dalton, PT    Transfer Assessment/Treatment    Transfers, Sit-Stand [place] minimum assist (75% patient effort)  -CT minimum assist (75% patient effort)  -BC minimum assist (75% patient effort)  -AD    Transfers, Stand-Sit [place] minimum assist (75% patient effort)  -CT minimum assist (75% patient effort)  -BC minimum assist (75% patient effort)  -AD    Transfers, Sit-Stand-Sit, Assist Device rolling walker  -CT rolling walker  -BC rolling walker  -AD    Toilet Transfer, [place]  minimum assist (75% patient effort)   Bedside commode  -BC  minimum assist (75% patient effort)   Bedside commode  -AD    Toilet Transfer, Assistive Device  rolling walker  -BC rolling walker  -AD    Transfer, Safety Issues balance decreased during turns  -CT balance decreased during turns  -BC balance decreased during turns  -AD    Transfer, Impairments strength decreased;impaired balance  -CT strength decreased;impaired balance  -BC strength decreased;impaired balance  -AD    Recorded by [CT] Bhargavi Vásquez, PT [BC] Vandana Pike, PT [AD] Ashley Claudene Dalton, PT    Gait Assessment/Treatment    Gait, [place] Level minimum assist (75% patient effort)  -CT minimum assist (75% patient effort)  -BC minimum assist (75% patient effort)  -AD    Gait, Assistive Device rolling walker  -CT rolling walker  -BC rolling walker  -AD    Gait, Distance (Feet) 120  -  -  -AD    Gait, Gait Pattern Analysis swing-through gait  -CT swing-through gait  -BC swing-through gait  -AD    Gait, Gait Deviations   calvin decreased  -AD    Gait, Safety Issues   step length decreased  -AD    Gait, Impairments strength decreased  -CT strength decreased  -BC strength decreased  -AD    Recorded by [CT] Bhargavi Vásquez, PT [BC] Vandana Pike, PT [AD] Ashley Claudene Dalton, PT    Toileting Assessment/Training    Toileting Assess/Train, Assistive Device   bedside commode  -AD    Toileting Assess/Train, Position   sitting  -AD    Toileting Assess/Train, Indepen Level   minimum assist (75% patient effort)  -AD    Toileting Assess/Train, Impairments   strength decreased;impaired balance  -AD    Recorded by   [AD] Ashley Claudene Dalton, PT    Therapy Exercises    Bilateral Lower Extremities AROM:;10 reps;sitting  -CT AROM:;10 reps;sitting  -BC AROM:;10 reps;sitting  -AD    Recorded by [CT] Bhargavi Vásquez, PT [BC] Vandana Pike, PT [AD] Ashley Claudene Dalton, PT    Positioning and Restraints    Pre-Treatment Position in bed  -CT in bed  -BC in bed  -AD    Post Treatment Position  chair  -CT chair  -BC wheelchair  -AD    In Chair sitting;call light within reach;encouraged to call for assist;notified nsg  -CT notified nsg;sitting;call light within reach;encouraged to call for assist  -BC sitting;call light within reach;encouraged to call for assist;with nsg   3L O2  -AD    Recorded by [CT] Bhargavi Vásquez, PT [BC] Vandana Pike, PT [AD] Ashley Claudene Dalton, PT      User Key  (r) = Recorded By, (t) = Taken By, (c) = Cosigned By    Initials Name Effective Dates    AD Ashley Claudene Dalton, PT 02/04/16 -     BC Vandana Pike, PT 03/14/16 -     CT Bhargavi Vásquez, PT 03/14/16 -                 IP PT Goals       08/01/17 1453          Bed Mobility PT LTG    Bed Mobility PT LTG, Date Established 08/01/17  -AD      Bed Mobility PT LTG, Time to Achieve by discharge  -AD      Bed Mobility PT LTG, Activity Type supine to sit/sit to supine  -AD      Bed Mobility PT LTG, [place] Level contact guard assist;verbal cues required  -AD      Bed Mobility PT Goal  LTG, Assist Device bed rails  -AD      Transfer Training PT LTG    Transfer Training PT LTG, Date Established 08/01/17  -AD      Transfer Training PT LTG, Time to Achieve by discharge  -AD      Transfer Training PT LTG, Activity Type bed to chair /chair to bed;sit to stand/stand to sit  -AD      Transfer Training PT LTG, [place] Level minimum assist (75% patient effort)  -AD      Transfer Training PT LTG, Assist Device walker, rolling  -AD      Gait Training PT LTG    Gait Training Goal PT LTG, Date Established 08/01/17  -AD      Gait Training Goal PT LTG, Time to Achieve by discharge  -AD      Gait Training Goal PT LTG, [place] Level minimum assist (75% patient effort)  -AD      Gait Training Goal PT LTG, Assist Device walker, rolling  -AD      Gait Training Goal PT LTG, Distance to Achieve 150  -AD        User Key  (r) = Recorded By, (t) = Taken By, (c) = Cosigned By    Initials Name Provider Type    AD Ashley Claudene Dalton,  PT Physical Therapist          Physical Therapy Education     Title: PT OT SLP Therapies (Done)     Topic: Physical Therapy (Done)     Point: Mobility training (Done)    Learning Progress Summary    Learner Readiness Method Response Comment Documented by Status   Patient Acceptance E VU  CT 08/04/17 1118 Done    Acceptance E VU  BC 08/03/17 1632 Done    Acceptance E VU  SS 08/02/17 1912 Done    Acceptance E VU  TONY 08/02/17 1239 Done    Acceptance E VU  AD 08/02/17 1032 Done    Acceptance E VU  AD 08/01/17 1452 Done    Acceptance E VU  TONY 08/01/17 1110 Done    Acceptance E VU  SN 07/29/17 2031 Done    Acceptance E VU  KM 07/28/17 2053 Done   Family Acceptance E VU  KM 07/28/17 2053 Done               Point: Home exercise program (Done)    Learning Progress Summary    Learner Readiness Method Response Comment Documented by Status   Patient Acceptance E VU  CT 08/04/17 1118 Done    Acceptance E VU  BC 08/03/17 1632 Done    Acceptance E VU  SS 08/02/17 1912 Done    Acceptance E VU  TONY 08/02/17 1239 Done    Acceptance E VU  AD 08/02/17 1032 Done    Acceptance E VU  AD 08/01/17 1452 Done    Acceptance E VU  TONY 08/01/17 1110 Done    Acceptance E VU  SN 07/29/17 2031 Done    Acceptance E VU  KM 07/28/17 2053 Done   Family Acceptance E VU  KM 07/28/17 2053 Done               Point: Body mechanics (Done)    Learning Progress Summary    Learner Readiness Method Response Comment Documented by Status   Patient Acceptance E VU  CT 08/04/17 1118 Done    Acceptance E VU  BC 08/03/17 1632 Done    Acceptance E VU  SS 08/02/17 1912 Done    Acceptance E VU  TONY 08/02/17 1239 Done    Acceptance E VU  AD 08/02/17 1032 Done    Acceptance E VU  AD 08/01/17 1452 Done    Acceptance E VU  TONY 08/01/17 1110 Done    Acceptance E VU  SN 07/29/17 2031 Done    Acceptance E VU  KM 07/28/17 2053 Done   Family Acceptance E VU  KM 07/28/17 2053 Done               Point: Precautions (Done)    Learning Progress Summary    Learner Readiness Method Response  Comment Documented by Status   Patient Acceptance E VU  CT 08/04/17 1118 Done    Acceptance E VU  BC 08/03/17 1632 Done    Acceptance E VU  SS 08/02/17 1912 Done    Acceptance E VU  TONY 08/02/17 1239 Done    Acceptance E VU  AD 08/02/17 1032 Done    Acceptance E VU  AD 08/01/17 1452 Done    Acceptance E VU  TONY 08/01/17 1110 Done    Acceptance E VU  SN 07/29/17 2031 Done    Acceptance E VU  KM 07/28/17 2053 Done   Family Acceptance E VU  KM 07/28/17 2053 Done                      User Key     Initials Effective Dates Name Provider Type Discipline    AD 02/04/16 -  Ashley Claudene Dalton, PT Physical Therapist PT    KM 06/16/16 -  Ricky Savage, RN Registered Nurse Nurse    TONY 06/16/16 -  Genoveva Garcia, RN Registered Nurse Nurse    SN 11/08/16 -  Chetna Mujica, RN Registered Nurse Nurse    BC 03/14/16 -  Vandana Pike, PT Physical Therapist PT    CT 03/14/16 -  Bhargavi Vásquez, PT Physical Therapist PT    SS 11/18/16 -  Kelsie Carrillo, RN Registered Nurse Nurse                    PT Recommendation and Plan  Planned Therapy Interventions: balance training, bed mobility training, gait training, home exercise program, neuromuscular re-education, patient/family education, postural re-education, ROM (Range of Motion), strengthening, stretching, transfer training  PT Frequency: 3-5 times/wk, per priority policy             Outcome Measures       08/04/17 1100 08/03/17 1600 08/02/17 1000    How much help from another person do you currently need...    Turning from your back to your side while in flat bed without using bedrails? 3  -CT 3  -BC 3  -AD    Moving from lying on back to sitting on the side of a flat bed without bedrails? 3  -CT 3  -BC 3  -AD    Moving to and from a bed to a chair (including a wheelchair)? 3  -CT 3  -BC 3  -AD    Standing up from a chair using your arms (e.g., wheelchair, bedside chair)? 3  -CT 3  -BC 3  -AD    Climbing 3-5 steps with a railing? 2  -CT 2  -BC 2  -AD    To walk in  hospital room? 3  -CT 3  -BC 3  -AD    AM-PAC 6 Clicks Score 17  -CT 17  -BC 17  -AD    Functional Assessment    Outcome Measure Options AM-PAC 6 Clicks Basic Mobility (PT)  -CT AM-PAC 6 Clicks Basic Mobility (PT)  -BC AM-PAC 6 Clicks Basic Mobility (PT)  -AD      08/01/17 1400          How much help from another person do you currently need...    Turning from your back to your side while in flat bed without using bedrails? 3  -AD      Moving from lying on back to sitting on the side of a flat bed without bedrails? 3  -AD      Moving to and from a bed to a chair (including a wheelchair)? 3  -AD      Standing up from a chair using your arms (e.g., wheelchair, bedside chair)? 3  -AD      Climbing 3-5 steps with a railing? 2  -AD      To walk in hospital room? 3  -AD      AM-PAC 6 Clicks Score 17  -AD      Functional Assessment    Outcome Measure Options AM-PAC 6 Clicks Basic Mobility (PT)  -AD        User Key  (r) = Recorded By, (t) = Taken By, (c) = Cosigned By    Initials Name Provider Type    AD Ashley Claudene Dalton, PT Physical Therapist    BC Vandana Pike, PT Physical Therapist    CT Bhargavi Vásquez PT Physical Therapist           Time Calculation:         PT Charges       08/04/17 1119          Time Calculation    PT Received On 08/04/17  -CT      PT - Next Appointment 08/07/17  -CT      PT Goal Re-Cert Due Date 08/15/17  -CT      Time Calculation- PT    Total Timed Code Minutes- PT 30 minute(s)  -CT        User Key  (r) = Recorded By, (t) = Taken By, (c) = Cosigned By    Initials Name Provider Type    CT Bhargavi Vásquez PT Physical Therapist          Therapy Charges for Today     Code Description Service Date Service Provider Modifiers Qty    63653502448 HC GAIT TRAINING EA 15 MIN 8/4/2017 Bhargavi Vásquez, PT GP 1    81825810240 HC PT THER PROC EA 15 MIN 8/4/2017 Bhargavi Vásquez, PT GP 1    97371333310 HC PT THER SUPP EA 15 MIN 8/4/2017 Bhargavi Vásquez, PT GP 2          PT G-Codes  PT Professional Judgement  Used?: Yes  Outcome Measure Options: AM-PAC 6 Clicks Basic Mobility (PT)  Functional Limitation: Mobility: Walking and moving around  Mobility: Walking and Moving Around Current Status (): At least 60 percent but less than 80 percent impaired, limited or restricted  Mobility: Walking and Moving Around Goal Status (): At least 60 percent but less than 80 percent impaired, limited or restricted    Bhargavi Vásquez, PT  8/4/2017

## 2017-08-04 NOTE — PLAN OF CARE
Problem: Fluid Volume Excess (Adult,Obstetrics,Pediatric)  Goal: Identify Related Risk Factors and Signs and Symptoms  Outcome: Ongoing (interventions implemented as appropriate)    07/30/17 2225 07/31/17 1951   Fluid Volume Excess   Fluid Volume Excess: Related Risk Factors --  cardiac changes   Signs and Symptoms (Fluid Volume Excess) activity intolerance;edema --        Goal: Stable Weight  Outcome: Ongoing (interventions implemented as appropriate)    08/01/17 2235   Fluid Volume Excess (Adult,Obstetrics,Pediatric)   Stable Weight making progress toward outcome       Goal: Balanced Intake/Output  Outcome: Ongoing (interventions implemented as appropriate)    08/01/17 2235   Fluid Volume Excess (Adult,Obstetrics,Pediatric)   Balanced Intake/Output making progress toward outcome         Problem: Fall Risk (Adult)  Goal: Identify Related Risk Factors and Signs and Symptoms  Outcome: Ongoing (interventions implemented as appropriate)    07/28/17 0904 07/30/17 0833   Fall Risk   Fall Risk: Related Risk Factors --  age-related changes   Fall Risk: Signs and Symptoms presence of risk factors --        Goal: Absence of Falls  Outcome: Ongoing (interventions implemented as appropriate)    08/01/17 2235   Fall Risk (Adult)   Absence of Falls making progress toward outcome         Problem: Skin Integrity Impairment, Risk/Actual (Adult)  Goal: Identify Related Risk Factors and Signs and Symptoms  Outcome: Ongoing (interventions implemented as appropriate)    07/27/17 1822 07/31/17 0913   Skin Integrity Impairment, Risk/Actual   Skin Integrity Impairment, Risk/Actual: Related Risk Factors --  age extremes;edema   Signs and Symptoms (Skin Integrity Impairment) edema --        Goal: Skin Integrity/Wound Healing  Outcome: Ongoing (interventions implemented as appropriate)    08/01/17 2235   Skin Integrity Impairment, Risk/Actual (Adult)   Skin Integrity/Wound Healing making progress toward outcome

## 2017-08-05 VITALS
HEART RATE: 78 BPM | HEIGHT: 67 IN | TEMPERATURE: 97.8 F | BODY MASS INDEX: 28.77 KG/M2 | WEIGHT: 183.3 LBS | RESPIRATION RATE: 20 BRPM | OXYGEN SATURATION: 95 % | DIASTOLIC BLOOD PRESSURE: 61 MMHG | SYSTOLIC BLOOD PRESSURE: 129 MMHG

## 2017-08-05 LAB
ANION GAP SERPL CALCULATED.3IONS-SCNC: 7 MMOL/L (ref 3.6–11.2)
BASOPHILS # BLD AUTO: 0.04 10*3/MM3 (ref 0–0.3)
BASOPHILS NFR BLD AUTO: 0.7 % (ref 0–2)
BUN BLD-MCNC: 26 MG/DL (ref 7–21)
BUN/CREAT SERPL: 14.8 (ref 7–25)
CALCIUM SPEC-SCNC: 9.3 MG/DL (ref 7.7–10)
CHLORIDE SERPL-SCNC: 97 MMOL/L (ref 99–112)
CO2 SERPL-SCNC: 34 MMOL/L (ref 24.3–31.9)
CREAT BLD-MCNC: 1.76 MG/DL (ref 0.43–1.29)
DEPRECATED RDW RBC AUTO: 64.8 FL (ref 37–54)
EOSINOPHIL # BLD AUTO: 0.51 10*3/MM3 (ref 0–0.7)
EOSINOPHIL NFR BLD AUTO: 8.7 % (ref 0–7)
ERYTHROCYTE [DISTWIDTH] IN BLOOD BY AUTOMATED COUNT: 21.7 % (ref 11.5–14.5)
GFR SERPL CREATININE-BSD FRML MDRD: 28 ML/MIN/1.73
GLUCOSE BLD-MCNC: 101 MG/DL (ref 70–110)
HCT VFR BLD AUTO: 33.7 % (ref 37–47)
HGB BLD-MCNC: 10.6 G/DL (ref 12–16)
IMM GRANULOCYTES # BLD: 0.01 10*3/MM3 (ref 0–0.03)
IMM GRANULOCYTES NFR BLD: 0.2 % (ref 0–0.5)
INR PPP: 2.72 (ref 0.9–1.1)
LYMPHOCYTES # BLD AUTO: 1.09 10*3/MM3 (ref 1–3)
LYMPHOCYTES NFR BLD AUTO: 18.5 % (ref 16–46)
MCH RBC QN AUTO: 26.8 PG (ref 27–33)
MCHC RBC AUTO-ENTMCNC: 31.5 G/DL (ref 33–37)
MCV RBC AUTO: 85.1 FL (ref 80–94)
MONOCYTES # BLD AUTO: 0.42 10*3/MM3 (ref 0.1–0.9)
MONOCYTES NFR BLD AUTO: 7.1 % (ref 0–12)
NEUTROPHILS # BLD AUTO: 3.81 10*3/MM3 (ref 1.4–6.5)
NEUTROPHILS NFR BLD AUTO: 64.8 % (ref 40–75)
OSMOLALITY SERPL CALC.SUM OF ELEC: 280.6 MOSM/KG (ref 273–305)
PLATELET # BLD AUTO: 184 10*3/MM3 (ref 130–400)
PMV BLD AUTO: 10.8 FL (ref 6–10)
POTASSIUM BLD-SCNC: 3.7 MMOL/L (ref 3.5–5.3)
PROTHROMBIN TIME: 29.2 SECONDS (ref 11–15.4)
RBC # BLD AUTO: 3.96 10*6/MM3 (ref 4.2–5.4)
SODIUM BLD-SCNC: 138 MMOL/L (ref 135–153)
WBC NRBC COR # BLD: 5.88 10*3/MM3 (ref 4.5–12.5)

## 2017-08-05 PROCEDURE — 94799 UNLISTED PULMONARY SVC/PX: CPT

## 2017-08-05 PROCEDURE — 85025 COMPLETE CBC W/AUTO DIFF WBC: CPT | Performed by: FAMILY MEDICINE

## 2017-08-05 PROCEDURE — 80048 BASIC METABOLIC PNL TOTAL CA: CPT | Performed by: FAMILY MEDICINE

## 2017-08-05 PROCEDURE — 85610 PROTHROMBIN TIME: CPT | Performed by: FAMILY MEDICINE

## 2017-08-05 RX ORDER — FUROSEMIDE 40 MG/1
40 TABLET ORAL DAILY
Qty: 30 TABLET | Refills: 0 | Status: ON HOLD | OUTPATIENT
Start: 2017-08-05 | End: 2017-10-09

## 2017-08-05 RX ORDER — ASPIRIN 81 MG/1
81 TABLET ORAL DAILY
Qty: 30 TABLET | Refills: 0 | Status: ON HOLD | OUTPATIENT
Start: 2017-08-05 | End: 2017-12-26

## 2017-08-05 RX ORDER — ATORVASTATIN CALCIUM 40 MG/1
40 TABLET, FILM COATED ORAL NIGHTLY
Qty: 30 TABLET | Refills: 0 | Status: ON HOLD | OUTPATIENT
Start: 2017-08-05 | End: 2017-10-09

## 2017-08-05 RX ORDER — PSEUDOEPHEDRINE HCL 30 MG
100 TABLET ORAL 2 TIMES DAILY
Qty: 60 CAPSULE | Refills: 0 | Status: ON HOLD | OUTPATIENT
Start: 2017-08-05 | End: 2017-10-09

## 2017-08-05 RX ORDER — HYDRALAZINE HYDROCHLORIDE 100 MG/1
100 TABLET, FILM COATED ORAL EVERY 8 HOURS SCHEDULED
Qty: 90 TABLET | Refills: 0 | Status: SHIPPED | OUTPATIENT
Start: 2017-08-05 | End: 2023-01-13 | Stop reason: HOSPADM

## 2017-08-05 RX ORDER — METOPROLOL SUCCINATE 50 MG/1
50 TABLET, EXTENDED RELEASE ORAL EVERY 12 HOURS SCHEDULED
Qty: 60 TABLET | Refills: 0 | Status: ON HOLD | OUTPATIENT
Start: 2017-08-05 | End: 2017-10-09

## 2017-08-05 RX ORDER — WARFARIN SODIUM 4 MG/1
4 TABLET ORAL
Status: DISCONTINUED | OUTPATIENT
Start: 2017-08-05 | End: 2017-08-05 | Stop reason: HOSPADM

## 2017-08-05 RX ADMIN — CHOLECALCIFEROL CAP 125 MCG (5000 UNIT) 5000 UNITS: 125 CAP at 08:50

## 2017-08-05 RX ADMIN — HYDRALAZINE HYDROCHLORIDE 100 MG: 50 TABLET ORAL at 05:30

## 2017-08-05 RX ADMIN — FUROSEMIDE 40 MG: 40 TABLET ORAL at 08:51

## 2017-08-05 RX ADMIN — ASPIRIN 81 MG: 81 TABLET ORAL at 08:51

## 2017-08-05 RX ADMIN — PANTOPRAZOLE SODIUM 40 MG: 40 TABLET, DELAYED RELEASE ORAL at 05:30

## 2017-08-05 RX ADMIN — CITALOPRAM HYDROBROMIDE 20 MG: 20 TABLET ORAL at 08:51

## 2017-08-05 RX ADMIN — METOPROLOL SUCCINATE 50 MG: 50 TABLET, FILM COATED, EXTENDED RELEASE ORAL at 08:50

## 2017-08-05 RX ADMIN — DOCUSATE SODIUM 100 MG: 100 CAPSULE, LIQUID FILLED ORAL at 08:51

## 2017-08-05 RX ADMIN — ALPRAZOLAM 0.5 MG: 0.5 TABLET ORAL at 08:50

## 2017-08-05 RX ADMIN — ISOSORBIDE MONONITRATE 60 MG: 60 TABLET, EXTENDED RELEASE ORAL at 08:50

## 2017-08-05 RX ADMIN — ALBUTEROL SULFATE 2.5 MG: 2.5 SOLUTION RESPIRATORY (INHALATION) at 06:41

## 2017-08-05 NOTE — DISCHARGE SUMMARY
Date of Admission: 7/27/2017  Date of Discharge:  8/5/2017    Discharge Diagnosis:     Right heart failure, acute on chronic    Heart failure, chronic, right-sided    Stage III chronic kidney disease contraindicating use of ACE inhibitor or angiotensin receptor blocker    Hypertension    Constipation    Dyslipidemia    Bradycardia with history of permanent pacemaker implantation          Hospital Course  Patient is a 84 y.o. female presented with heart failure.  Evaluated by cardiology and nephrology.  Diuresed cautiously.  Remained on telemetry monitoring.  Showed stabilization of renal failure.  Pulmonary status improved with discharge medications as listed below.  Currently states she's a little tired but otherwise feels her normal self      Pertinent Test Results:   Lab Results   Component Value Date    WBC 5.88 08/05/2017    HGB 10.6 (L) 08/05/2017    HCT 33.7 (L) 08/05/2017    MCV 85.1 08/05/2017     08/05/2017     Lab Results   Component Value Date    GLUCOSE 101 08/05/2017    CALCIUM 9.3 08/05/2017     08/05/2017    K 3.7 08/05/2017    CO2 34.0 (H) 08/05/2017    CL 97 (L) 08/05/2017    BUN 26 (H) 08/05/2017    CREATININE 1.76 (H) 08/05/2017    EGFRIFNONA 28 (L) 08/05/2017    BCR 14.8 08/05/2017    ANIONGAP 7.0 08/05/2017     Lab Results   Component Value Date    ALT 12 07/29/2017    AST 15 07/29/2017       Lab Results   Component Value Date    INR 2.72 (H) 08/05/2017    INR 3.14 (H) 08/04/2017    INR 3.43 (H) 08/03/2017                                                    Physical Exam:     General Appearance:    Alert, cooperative, in no acute distress   Head:    Normocephalic, without obvious abnormality, atraumatic   Eyes:            Lids and lashes normal, conjunctivae and sclerae normal, no   icterus, no pallor, corneas clear, PERRLA   Ears:    Ears appear intact with no abnormalities noted   Throat:   No oral lesions, no thrush, oral mucosa moist   Neck:   No adenopathy, supple, trachea  midline, no thyromegaly, no   carotid bruit, no JVD   Back:     No kyphosis present, no scoliosis present, no skin lesions,      erythema or scars, no tenderness to percussion or                   palpation,   range of motion normal   Lungs:     Clear to auscultation,respirations regular, even and                  unlabored    Heart:    Regular rhythm and normal rate, normal S1 and S2, no            murmur, no gallop, no rub, no click   Chest Wall:    No abnormalities observed   Abdomen:     Normal bowel sounds, no masses, no organomegaly, soft        non-tender, non-distended, no guarding, no rebound                tenderness   Rectal:   Deferred   Extremities:   Moves all extremities well, no edema, no cyanosis, no             redness   Pulses:   Pulses palpable and equal bilaterally   Skin:   No bleeding, bruising or rash   Lymph nodes:   No palpable adenopathy   Neurologic:   Cranial nerves 2 - 12 grossly intact, sensation intact, DTR       present and equal bilaterally       Discharge Disposition  Home or Self Care    Discharge Medications   Tre Kinney   Home Medication Instructions ALVARO:[id number]    Printed on:08/05/17 1011   Medication Information                      ALPRAZolam (XANAX) 1 MG tablet  Take 0.5 mg by mouth 2 (Two) Times a Day.             aspirin 81 MG EC tablet  Take 1 tablet by mouth Daily.             atorvastatin (LIPITOR) 40 MG tablet  Take 1 tablet by mouth Every Night.             Cholecalciferol (VITAMIN D3) 5000 UNITS capsule capsule  Take 1 capsule by mouth Daily.             citalopram (CELEXA) 20 MG tablet  Take 20 mg by mouth Daily.             docusate sodium 100 MG capsule  Take 100 mg by mouth 2 (Two) Times a Day.             furosemide (LASIX) 40 MG tablet  Take 1 tablet by mouth Daily.             hydrALAZINE (APRESOLINE) 100 MG tablet  Take 1 tablet by mouth Every 8 (Eight) Hours.             HYDROcodone-acetaminophen (NORCO) 7.5-325 MG per tablet  Take 1 tablet by mouth  2 (Two) Times a Day.             isosorbide mononitrate (IMDUR) 60 MG 24 hr tablet  Take 60 mg by mouth Daily.             metoprolol succinate XL (TOPROL-XL) 50 MG 24 hr tablet  Take 1 tablet by mouth Every 12 (Twelve) Hours.             pantoprazole (PROTONIX) 40 MG EC tablet  Take 40 mg by mouth Daily.             warfarin (COUMADIN) 6 MG tablet  Take 6 mg by mouth Daily.                   Discharge Diet:    Diet Orders (active)     Start     Ordered    08/01/17 0951  Diet Regular; Low Sodium  Diet Effective Now      08/01/17 0950    07/28/17 1800  Dietary Nutrition Supplements Milkshake  Daily With Dinner     Comments:  Дмитрий milkshake daily with dinner    07/28/17 1530          Follow-up Appointments  Your Scheduled Appointments     RHONDA 9/1/17 12 :15             Additional Instructions for the Follow-ups that You Need to Schedule     Additional Discharge Follow-Up (Specify Provider)    As directed    To:  Rhonda   Follow Up:  2 Weeks       Additional Discharge Follow-Up (Specify Provider)    As directed    To:  Marcos   Follow Up:  2 Weeks       Discharge Follow-Up With Specified Provider    As directed    To:  Lizabeth   Follow Up:  1 Week       Protime-INR    Aug 07, 2017 (Approximate)    INR Monday                  Samuel Duane Kreis, MD  08/05/17  10:11 AM

## 2017-08-05 NOTE — PLAN OF CARE
Problem: Skin Integrity Impairment, Risk/Actual (Adult)  Goal: Skin Integrity/Wound Healing  Outcome: Ongoing (interventions implemented as appropriate)    08/04/17 2806   Skin Integrity Impairment, Risk/Actual (Adult)   Skin Integrity/Wound Healing making progress toward outcome

## 2017-08-05 NOTE — PLAN OF CARE
Problem: Fluid Volume Excess (Adult,Obstetrics,Pediatric)  Goal: Stable Weight  Outcome: Ongoing (interventions implemented as appropriate)  Goal: Balanced Intake/Output  Outcome: Ongoing (interventions implemented as appropriate)    08/01/17 2235   Fluid Volume Excess (Adult,Obstetrics,Pediatric)   Balanced Intake/Output making progress toward outcome         Problem: Fall Risk (Adult)  Goal: Identify Related Risk Factors and Signs and Symptoms  Outcome: Ongoing (interventions implemented as appropriate)    07/28/17 0904 07/30/17 0833   Fall Risk   Fall Risk: Related Risk Factors --  age-related changes   Fall Risk: Signs and Symptoms presence of risk factors --        Goal: Absence of Falls  Outcome: Ongoing (interventions implemented as appropriate)    08/04/17 2234   Fall Risk (Adult)   Absence of Falls making progress toward outcome         Problem: Skin Integrity Impairment, Risk/Actual (Adult)  Goal: Identify Related Risk Factors and Signs and Symptoms  Outcome: Ongoing (interventions implemented as appropriate)    07/27/17 1822 07/31/17 0913   Skin Integrity Impairment, Risk/Actual   Skin Integrity Impairment, Risk/Actual: Related Risk Factors --  age extremes;edema   Signs and Symptoms (Skin Integrity Impairment) edema --        Goal: Skin Integrity/Wound Healing  Outcome: Ongoing (interventions implemented as appropriate)    08/04/17 2233   Skin Integrity Impairment, Risk/Actual (Adult)   Skin Integrity/Wound Healing making progress toward outcome

## 2017-08-05 NOTE — PLAN OF CARE
Problem: Fluid Volume Excess (Adult,Obstetrics,Pediatric)  Goal: Identify Related Risk Factors and Signs and Symptoms  Outcome: Ongoing (interventions implemented as appropriate)    08/04/17 2231   Fluid Volume Excess   Fluid Volume Excess: Related Risk Factors disease process   Signs and Symptoms (Fluid Volume Excess) activity intolerance

## 2017-08-05 NOTE — PROGRESS NOTES
Discharge Planning Assessment  [facility]     Patient Name: Tre Kinney  MRN: [id number]  Today's Date: 8/5/2017    Admit Date: 7/27/2017          Discharge Needs Assessment     None            Discharge Plan       08/05/17 1044    Final Note    Final Note Pt is being discharged home on this date. SS faxed pt's information to [facility] to call report. No further needs at this time.         Discharge Placement     No information found        Expected Discharge Date and Time     Expected Discharge Date Expected Discharge Time    Aug 5, 2017           Jessi Sheffield

## 2017-08-05 NOTE — PLAN OF CARE
Problem: Fall Risk (Adult)  Goal: Absence of Falls  Outcome: Ongoing (interventions implemented as appropriate)    08/04/17 5897   Fall Risk (Adult)   Absence of Falls making progress toward outcome

## 2017-08-22 ENCOUNTER — OFFICE VISIT (OUTPATIENT)
Dept: CARDIOLOGY | Facility: CLINIC | Age: 82
End: 2017-08-22

## 2017-08-22 VITALS
RESPIRATION RATE: 16 BRPM | SYSTOLIC BLOOD PRESSURE: 130 MMHG | HEART RATE: 79 BPM | BODY MASS INDEX: 28.41 KG/M2 | DIASTOLIC BLOOD PRESSURE: 73 MMHG | WEIGHT: 181 LBS | HEIGHT: 67 IN

## 2017-08-22 DIAGNOSIS — I10 ESSENTIAL HYPERTENSION: ICD-10-CM

## 2017-08-22 DIAGNOSIS — I25.10 ARTERIOSCLEROSIS OF CORONARY ARTERY: ICD-10-CM

## 2017-08-22 DIAGNOSIS — I50.812: Primary | ICD-10-CM

## 2017-08-22 DIAGNOSIS — R06.02 SOB (SHORTNESS OF BREATH): ICD-10-CM

## 2017-08-22 DIAGNOSIS — E78.00 ELEVATED CHOLESTEROL: ICD-10-CM

## 2017-08-22 DIAGNOSIS — R00.2 PALPITATIONS: ICD-10-CM

## 2017-08-22 PROCEDURE — 99214 OFFICE O/P EST MOD 30 MIN: CPT | Performed by: INTERNAL MEDICINE

## 2017-08-22 NOTE — PROGRESS NOTES
Myles Barone MD  Tre Kinney  12/9/1932 08/22/2017    Patient Active Problem List   Diagnosis   • Acid reflux   • Ankle arthralgia   • Cardiac conduction disorder   • Closed fracture of distal fibula   • Arteriosclerosis of coronary artery   • Fracture of distal end of tibia   • Elevated cholesterol   • BP (high blood pressure)   • Hypertrophic polyarthritis   • OP (osteoporosis)   • Right heart failure   • Heart failure, chronic, right-sided   • SOB (shortness of breath)   • Palpitations       Dear Myles Barone MD:    Subjective     Tre Kinney is a 84 y.o. female with the above medical problems who is here today for follow-up. The patient was recently seen at [facility] for an episode of right-sided heart failure.  According to the patient she has been having mildly worsening edema.  She was seen by Dr. Barone and her diuretics were increased.  She states she is doing mildly better since that time.  She denies any chest pain, palpitations, orthopnea, PND, dizziness or syncope.  She does complain of some shortness of breath which is apparently chronic.  She also complains of persistent lower extremity edema that appears improved from the edema noted during hospitalization.  She complains of occasional palpitations that occur daily.  She states the palpitations only lasts for a few minutes and resolve spontaneously.  She denies any ameliorating or exacerbating factors.  She denies any associated symptoms and states the intensity is mild.      Current Outpatient Prescriptions:   •  ALPRAZolam (XANAX) 1 MG tablet, Take 0.5 mg by mouth 2 (Two) Times a Day., Disp: , Rfl:   •  aspirin 81 MG EC tablet, Take 1 tablet by mouth Daily., Disp: 30 tablet, Rfl: 0  •  atorvastatin (LIPITOR) 40 MG tablet, Take 1 tablet by mouth Every Night., Disp: 30 tablet, Rfl: 0  •  Cholecalciferol (VITAMIN D3) 5000 UNITS capsule capsule, Take 1 capsule by mouth Daily., Disp: 30 capsule, Rfl: 5  •  citalopram  (CELEXA) 20 MG tablet, Take 20 mg by mouth Daily., Disp: , Rfl:   •  docusate sodium 100 MG capsule, Take 100 mg by mouth 2 (Two) Times a Day., Disp: 60 capsule, Rfl: 0  •  furosemide (LASIX) 40 MG tablet, Take 1 tablet by mouth Daily., Disp: 30 tablet, Rfl: 0  •  hydrALAZINE (APRESOLINE) 100 MG tablet, Take 1 tablet by mouth Every 8 (Eight) Hours., Disp: 90 tablet, Rfl: 0  •  HYDROcodone-acetaminophen (NORCO) 7.5-325 MG per tablet, Take 1 tablet by mouth 2 (Two) Times a Day., Disp: , Rfl:   •  isosorbide mononitrate (IMDUR) 60 MG 24 hr tablet, Take 60 mg by mouth Daily., Disp: , Rfl:   •  metoprolol succinate XL (TOPROL-XL) 50 MG 24 hr tablet, Take 1 tablet by mouth Every 12 (Twelve) Hours., Disp: 60 tablet, Rfl: 0  •  pantoprazole (PROTONIX) 40 MG EC tablet, Take 40 mg by mouth Daily., Disp: , Rfl:   •  warfarin (COUMADIN) 6 MG tablet, Take 6 mg by mouth Daily., Disp: , Rfl:     The following portions of the patient's history were reviewed and updated as appropriate: allergies, current medications, past family history, past medical history, past social history, past surgical history and problem list.    Review of Systems   Constitution: Negative for chills, diaphoresis, fever, weakness, malaise/fatigue and night sweats.   HENT: Negative for congestion, headaches, nosebleeds and sore throat.    Eyes: Positive for blurred vision. Negative for pain and redness.   Cardiovascular: Positive for leg swelling and palpitations. Negative for chest pain, dyspnea on exertion, orthopnea, paroxysmal nocturnal dyspnea and syncope.   Respiratory: Positive for shortness of breath. Negative for cough, hemoptysis and wheezing.    Endocrine: Negative for cold intolerance and heat intolerance.   Hematologic/Lymphatic: Does not bruise/bleed easily.   Skin: Negative for rash.   Musculoskeletal: Positive for arthritis, back pain, joint pain, joint swelling and stiffness. Negative for myalgias.   Gastrointestinal: Positive for diarrhea.  "Negative for abdominal pain, constipation, hematemesis, hematochezia, melena, nausea and vomiting.   Genitourinary: Negative for dysuria and hematuria.   Neurological: Negative for dizziness, focal weakness and numbness.   Psychiatric/Behavioral: Positive for depression. The patient is not nervous/anxious.        Objective   Blood pressure 130/73, pulse 79, resp. rate 16, height 67\" (170.2 cm), weight 181 lb (82.1 kg).    Physical Exam   Constitutional: She is oriented to person, place, and time. She appears well-developed and well-nourished.   Obese WF sitting comfortably on chair.   HENT:   Mouth/Throat: Oropharynx is clear and moist.   Eyes: EOM are normal. Pupils are equal, round, and reactive to light.   Neck: Neck supple. No JVD present. No tracheal deviation present. No thyromegaly present.   Cardiovascular: Normal rate, regular rhythm, S1 normal and S2 normal.  Exam reveals no gallop and no friction rub.    No murmur heard.  Pulmonary/Chest: Effort normal and breath sounds normal. No respiratory distress. She has no wheezes. She has no rales.   Abdominal: Soft. Bowel sounds are normal. She exhibits no mass. There is no tenderness.   Musculoskeletal: Normal range of motion. She exhibits no edema.   Lymphadenopathy:     She has no cervical adenopathy.   Neurological: She is alert and oriented to person, place, and time.   Skin: Skin is warm and dry. No rash noted.   Psychiatric: She has a normal mood and affect.       Procedures    Assessment/Plan     1.  Congestive heart failure: Patient with mild systolic heart failure for which she underwent diuresis and her recent hospitalization.  She is currently on hydralazine and isosorbide as a BiDil equivalent since she has chronic kidney disease.  She is also on a beta blocker.  She is not on spironolactone due to renal dysfunction.  Since her diuretics have recently been adjusted by the primary care team at this point we'll continue current regimen.    2.  Coronary " artery disease: Patient with history of coronary artery disease currently on aspirin, statin, beta blocker and isosorbide mononitrate.  She reports no episodes of chest pain at this point.  We'll continue current regimen.    3.  Dyslipidemia: Patient with history of dyslipidemia for which she is currently on a statin.  Her recent lipid panel showed adequate control.    4.  Hypertension: Patient with a history of hypertension that appears to be well-controlled on current regimen.  At this point would continue.    5.  Shortness of breath: Patient with history of shortness of breath but appears to be related to primary lung disease.  She has no reviewed his coronary function tests on record.  Will order.    6.  Palpitations: Patient with a history of palpitations concerning for possible arrhythmia.  At this point we will evaluate further with TSH and 48 hour Holter.     Diagnosis Plan   1. Heart failure, chronic, right-sided     2. Arteriosclerosis of coronary artery     3. Elevated cholesterol     4. Essential hypertension     5. SOB (shortness of breath)  Full Pulmonary Function Test With Bronchodilator   6. Palpitations  TSH    Holter Monitor - 48 Hour        Return in about 3 months (around 11/22/2017).    I appreciate the opportunity to participate in this patient's cardiovascular care.    Best Regards    Nba Rodríguez

## 2017-08-27 NOTE — H&P
Patient Identification:  Name:  Tre Kinney  Age:  84 y.o.  Sex:  female  :  1932  MRN:  [id number]   Visit Number:  [id number]  Primary Care Physician:  Myles Barone MD       Chief complaint: vvolume overload    History of presenting illness:  84 y.o. female with a history of CHF that presented with increasing volume overload with a BNP of 1300 and extremities with marked edema.  Her son states that she's gets markedly short of breath just walking 10-12 feet and is having to sit mostly as well as has orthopnea at night.  Due to her exacerbation of  Acute systolic CHF she's admitted for IV hydration while monitoring of her renal status first does have chronic kidney disease and iron deficiency anemia  ---------------------------------------------------------------------------------------------------------------------   Review Of Systems:  See HPI  ---------------------------------------------------------------------------------------------------------------------   Past Medical History:   Diagnosis Date   • Arthritis    • CHF (congestive heart failure)    • Coronary artery disease    • H/O blood clots    • History of transfusion     no reaction    • Hypertension      Past Surgical History:   Procedure Laterality Date   • ABDOMINAL SURGERY     • APPENDECTOMY     • CARDIAC SURGERY     •  SECTION     • CORONARY ANGIOPLASTY WITH STENT PLACEMENT     • PACEMAKER IMPLANTATION      left side    • SKIN BIOPSY     • TUBAL ABDOMINAL LIGATION       Family History   Problem Relation Age of Onset   • Other Mother      cardiac disorder   • Hypertension Mother    • Heart disease Mother    • Thyroid disease Mother    • Other Father      cardiac disorder   • Heart disease Father    • Hypertension Father    • Other Sister      cardiac disorder   • Other Brother      cardiac disorder   • Other Other      cardiac disorder   • Other Other      cardiac disorder     Social History     Social History   • Marital  status:      Spouse name: N/A   • Number of children: N/A   • Years of education: N/A     Social History Main Topics   • Smoking status: Never Smoker   • Smokeless tobacco: Not on file   • Alcohol use No   • Drug use: No   • Sexual activity: Defer     Other Topics Concern   • Not on file     Social History Narrative     ---------------------------------------------------------------------------------------------------------------------   Allergies:  Latex  ---------------------------------------------------------------------------------------------------------------------   Prior to Admission Medications     Prescriptions Last Dose Informant Patient Reported? Taking?    ALPRAZolam (XANAX) 1 MG tablet 7/27/2017 Self Yes Yes    Take 0.5 mg by mouth 2 (Two) Times a Day.    Cholecalciferol (VITAMIN D3) 5000 UNITS capsule capsule 7/27/2017 Pharmacy No Yes    Take 1 capsule by mouth Daily.    citalopram (CELEXA) 20 MG tablet 7/27/2017 Pharmacy Yes Yes    Take 20 mg by mouth Daily.    HYDROcodone-acetaminophen (NORCO) 7.5-325 MG per tablet 7/26/2017 Pharmacy Yes Yes    Take 1 tablet by mouth 2 (Two) Times a Day.    isosorbide mononitrate (IMDUR) 60 MG 24 hr tablet 7/27/2017 Pharmacy Yes Yes    Take 60 mg by mouth Daily.    pantoprazole (PROTONIX) 40 MG EC tablet 7/27/2017 Pharmacy Yes Yes    Take 40 mg by mouth Daily.    warfarin (COUMADIN) 6 MG tablet 7/27/2017 Self Yes Yes    Take 6 mg by mouth Daily.        Hospital Scheduled Meds:      No current facility-administered medications for this encounter.   ---------------------------------------------------------------------------------------------------------------------   Vital Signs:     Last 3 weights    08/03/17  0352 08/04/17  0325 08/05/17  0328   Weight: 181 lb 3.2 oz (82.2 kg) 162 lb (73.5 kg) 183 lb 4.8 oz (83.1 kg)     Body mass index is 28.71  kg/(m^2).  ---------------------------------------------------------------------------------------------------------------------   Physical Exam:  Constitutional:patient's alert short of breath to talk  HENT:  positive JVD   Eyes:    Neck:      Cardiovascular:  S1 and [id number]  Pulmonary/Chest:  Bilateral crackles in all fields  Abdominal:  soft  Musculoskeletal:     Neurological:   Skin: +3 edema bilaterally and extremities  Psychiatric:     ---------------------------------------------------------------------------------------------------------------------      ---------------------------------------------------------------------------------------------------------------------                 Invalid input(s): PROTEstimated Creatinine Clearance: 26.4 mL/min (by C-G formula based on Cr of 1.76).  No results found for: AMMONIA          No results found for: HGBA1C  No results found for: TSH, FREET4  No results found for: PREGTESTUR, PREGSERUM, HCG, HCGQUANT  Pain Management Panel     Pain Management Panel Latest Ref Rng & Units 6/10/2017 6/10/2017    CREATININE UR mg/dL 114.0 114.0    AMPHETAMINES SCREEN, URINE Negative - -    BARBITURATES SCREEN Negative - -    BENZODIAZEPINE SCREEN, URINE Negative - -    COCAINE SCREEN, URINE Negative - -    METHADONE SCREEN, URINE Negative - -                        ---------------------------------------------------------------------------------------------------------------------  Imaging Results (last 7 days)     Procedure Component Value Units Date/Time    XR Chest 1 View [[id number]] Collected:  08/01/17 1414     Updated:  08/01/17 1416    Narrative:       Technique: Frontal view the chest.     COMPARISON:  06/14/2017     INDICATION:     CHF      FINDINGS:    Bibasilar airspace disease or alveolar edema. Cardiomegaly  is noted. Prominent interstitial markings are noted. There are small  bilateral pleural effusions.        Impression:       Radiographic changes of  congestive failure.     This report was finalized on 8/1/2017 2:14 PM by Dr. Ion Del Rosario MD.       XR Chest 1 View [[id number]] Collected:  08/03/17 0757     Updated:  08/03/17 0800    Narrative:       Technique: Frontal view the chest.     COMPARISON:  8/1/2017     INDICATION:     chf      FINDINGS:    Bibasilar atelectasis. Cardiomegaly is noted. Prominent  interstitial markings are noted. There are small bilateral pleural  effusions.        Impression:       Radiographic changes of congestive failure.     This report was finalized on 8/3/2017 7:58 AM by Dr. Ion Del Rosario MD.             ---------------------------------------------------------------------------------------------------------------------  Assessment and Plan:  1. Acute systolic heart failu   With plan to diurese and monitor her renal status and may need cardiology to assist with trying to find tune this patient seems to be having more and more exacerbations  2. Atrial fibrillation atrial fibrillation with anticoagulation  3. pacemaker  4.   5.     yMles Barone MD  08/27/17  7:49 PM

## 2017-09-13 ENCOUNTER — EPISODE CHANGES (OUTPATIENT)
Dept: CASE MANAGEMENT | Facility: OTHER | Age: 82
End: 2017-09-13

## 2017-09-29 ENCOUNTER — APPOINTMENT (OUTPATIENT)
Dept: RESPIRATORY THERAPY | Facility: HOSPITAL | Age: 82
End: 2017-09-29
Attending: INTERNAL MEDICINE

## 2017-10-02 ENCOUNTER — HOSPITAL ENCOUNTER (OUTPATIENT)
Dept: RESPIRATORY THERAPY | Facility: HOSPITAL | Age: 82
Discharge: HOME | End: 2017-10-02
Attending: INTERNAL MEDICINE | Admitting: INTERNAL MEDICINE

## 2017-10-02 DIAGNOSIS — R06.02 SOB (SHORTNESS OF BREATH): ICD-10-CM

## 2017-10-02 PROCEDURE — 94727 GAS DIL/WSHOT DETER LNG VOL: CPT | Performed by: INTERNAL MEDICINE

## 2017-10-02 PROCEDURE — 94060 EVALUATION OF WHEEZING: CPT

## 2017-10-02 PROCEDURE — 94060 EVALUATION OF WHEEZING: CPT | Performed by: INTERNAL MEDICINE

## 2017-10-02 PROCEDURE — 94727 GAS DIL/WSHOT DETER LNG VOL: CPT

## 2017-10-02 PROCEDURE — 94729 DIFFUSING CAPACITY: CPT | Performed by: INTERNAL MEDICINE

## 2017-10-02 PROCEDURE — 94729 DIFFUSING CAPACITY: CPT

## 2017-10-09 ENCOUNTER — HOSPITAL ENCOUNTER (INPATIENT)
Facility: HOSPITAL | Age: 82
LOS: 2 days | Discharge: HOME | End: 2017-10-13
Attending: FAMILY MEDICINE | Admitting: FAMILY MEDICINE

## 2017-10-09 PROBLEM — I50.9 ACUTE CHF: Status: ACTIVE | Noted: 2017-10-09

## 2017-10-09 LAB
ALBUMIN SERPL-MCNC: 3.7 G/DL (ref 3.4–4.8)
ALBUMIN/GLOB SERPL: 1.5 G/DL (ref 1.5–2.5)
ALP SERPL-CCNC: 44 U/L (ref 35–104)
ALT SERPL W P-5'-P-CCNC: 19 U/L (ref 10–36)
ANION GAP SERPL CALCULATED.3IONS-SCNC: 11 MMOL/L (ref 3.6–11.2)
AST SERPL-CCNC: 22 U/L (ref 10–30)
BILIRUB SERPL-MCNC: 0.7 MG/DL (ref 0.2–1.8)
BNP SERPL-MCNC: 1885 PG/ML (ref 0–100)
BUN BLD-MCNC: 40 MG/DL (ref 7–21)
BUN/CREAT SERPL: 19.2 (ref 7–25)
CALCIUM SPEC-SCNC: 9.1 MG/DL (ref 7.7–10)
CHLORIDE SERPL-SCNC: 102 MMOL/L (ref 99–112)
CO2 SERPL-SCNC: 24 MMOL/L (ref 24.3–31.9)
CREAT BLD-MCNC: 2.08 MG/DL (ref 0.43–1.29)
GFR SERPL CREATININE-BSD FRML MDRD: 23 ML/MIN/1.73
GLOBULIN UR ELPH-MCNC: 2.5 GM/DL
GLUCOSE BLD-MCNC: 109 MG/DL (ref 70–110)
OSMOLALITY SERPL CALC.SUM OF ELEC: 284.2 MOSM/KG (ref 273–305)
POTASSIUM BLD-SCNC: 3.8 MMOL/L (ref 3.5–5.3)
PROT SERPL-MCNC: 6.2 G/DL (ref 6–8)
SODIUM BLD-SCNC: 137 MMOL/L (ref 135–153)
TROPONIN I SERPL-MCNC: 0.04 NG/ML

## 2017-10-09 PROCEDURE — 93010 ELECTROCARDIOGRAM REPORT: CPT | Performed by: INTERNAL MEDICINE

## 2017-10-09 PROCEDURE — 93005 ELECTROCARDIOGRAM TRACING: CPT | Performed by: FAMILY MEDICINE

## 2017-10-09 PROCEDURE — 83880 ASSAY OF NATRIURETIC PEPTIDE: CPT | Performed by: FAMILY MEDICINE

## 2017-10-09 PROCEDURE — 80053 COMPREHEN METABOLIC PANEL: CPT | Performed by: FAMILY MEDICINE

## 2017-10-09 PROCEDURE — G0378 HOSPITAL OBSERVATION PER HR: HCPCS

## 2017-10-09 PROCEDURE — 84484 ASSAY OF TROPONIN QUANT: CPT | Performed by: FAMILY MEDICINE

## 2017-10-09 PROCEDURE — 94799 UNLISTED PULMONARY SVC/PX: CPT

## 2017-10-09 RX ORDER — ASPIRIN 81 MG/1
81 TABLET ORAL DAILY
Status: DISCONTINUED | OUTPATIENT
Start: 2017-10-10 | End: 2017-10-13 | Stop reason: HOSPADM

## 2017-10-09 RX ORDER — ALPRAZOLAM 0.5 MG/1
0.5 TABLET ORAL NIGHTLY
Status: DISCONTINUED | OUTPATIENT
Start: 2017-10-09 | End: 2017-10-13 | Stop reason: HOSPADM

## 2017-10-09 RX ORDER — ISOSORBIDE MONONITRATE 60 MG/1
60 TABLET, EXTENDED RELEASE ORAL DAILY
Status: DISCONTINUED | OUTPATIENT
Start: 2017-10-10 | End: 2017-10-13 | Stop reason: HOSPADM

## 2017-10-09 RX ORDER — METOPROLOL TARTRATE 50 MG/1
50 TABLET, FILM COATED ORAL 2 TIMES DAILY
COMMUNITY
End: 2023-01-13 | Stop reason: HOSPADM

## 2017-10-09 RX ORDER — FUROSEMIDE 10 MG/ML
40 INJECTION INTRAMUSCULAR; INTRAVENOUS DAILY
Status: DISCONTINUED | OUTPATIENT
Start: 2017-10-09 | End: 2017-10-09

## 2017-10-09 RX ORDER — ACETAMINOPHEN 325 MG/1
325 TABLET ORAL EVERY 4 HOURS PRN
Status: DISCONTINUED | OUTPATIENT
Start: 2017-10-09 | End: 2017-10-13 | Stop reason: HOSPADM

## 2017-10-09 RX ORDER — SODIUM CHLORIDE 0.9 % (FLUSH) 0.9 %
1-10 SYRINGE (ML) INJECTION AS NEEDED
Status: DISCONTINUED | OUTPATIENT
Start: 2017-10-09 | End: 2017-10-13 | Stop reason: HOSPADM

## 2017-10-09 RX ORDER — HYDRALAZINE HYDROCHLORIDE 50 MG/1
100 TABLET, FILM COATED ORAL EVERY 8 HOURS SCHEDULED
Status: CANCELLED | OUTPATIENT
Start: 2017-10-09

## 2017-10-09 RX ORDER — HYDRALAZINE HYDROCHLORIDE 50 MG/1
100 TABLET, FILM COATED ORAL EVERY 8 HOURS SCHEDULED
Status: DISCONTINUED | OUTPATIENT
Start: 2017-10-09 | End: 2017-10-13 | Stop reason: HOSPADM

## 2017-10-09 RX ORDER — ISOSORBIDE MONONITRATE 60 MG/1
60 TABLET, EXTENDED RELEASE ORAL DAILY
Status: CANCELLED | OUTPATIENT
Start: 2017-10-10

## 2017-10-09 RX ORDER — HYDROCODONE BITARTRATE AND ACETAMINOPHEN 7.5; 325 MG/1; MG/1
1 TABLET ORAL 2 TIMES DAILY PRN
Status: DISCONTINUED | OUTPATIENT
Start: 2017-10-09 | End: 2017-10-13 | Stop reason: HOSPADM

## 2017-10-09 RX ORDER — ASPIRIN 81 MG/1
81 TABLET ORAL DAILY
Status: CANCELLED | OUTPATIENT
Start: 2017-10-10

## 2017-10-09 RX ORDER — NITROGLYCERIN 0.4 MG/1
0.4 TABLET SUBLINGUAL
Status: DISCONTINUED | OUTPATIENT
Start: 2017-10-09 | End: 2017-10-13 | Stop reason: HOSPADM

## 2017-10-09 RX ORDER — FUROSEMIDE 20 MG/1
20 TABLET ORAL 2 TIMES DAILY
Status: DISCONTINUED | OUTPATIENT
Start: 2017-10-09 | End: 2017-10-10

## 2017-10-09 RX ORDER — HYDROCODONE BITARTRATE AND ACETAMINOPHEN 7.5; 325 MG/1; MG/1
1 TABLET ORAL EVERY 8 HOURS PRN
COMMUNITY

## 2017-10-09 RX ORDER — FUROSEMIDE 20 MG/1
20 TABLET ORAL 2 TIMES DAILY
Status: CANCELLED | OUTPATIENT
Start: 2017-10-09

## 2017-10-09 RX ORDER — PANTOPRAZOLE SODIUM 40 MG/1
40 TABLET, DELAYED RELEASE ORAL EVERY MORNING
Status: CANCELLED | OUTPATIENT
Start: 2017-10-10

## 2017-10-09 RX ORDER — FUROSEMIDE 20 MG/1
20 TABLET ORAL 2 TIMES DAILY
COMMUNITY
End: 2017-11-17 | Stop reason: HOSPADM

## 2017-10-09 RX ORDER — PANTOPRAZOLE SODIUM 40 MG/1
40 TABLET, DELAYED RELEASE ORAL
Status: DISCONTINUED | OUTPATIENT
Start: 2017-10-10 | End: 2017-10-13 | Stop reason: HOSPADM

## 2017-10-09 RX ORDER — HYDROCODONE BITARTRATE AND ACETAMINOPHEN 7.5; 325 MG/1; MG/1
1 TABLET ORAL 2 TIMES DAILY PRN
Status: CANCELLED | OUTPATIENT
Start: 2017-10-09

## 2017-10-09 RX ORDER — CITALOPRAM 20 MG/1
20 TABLET ORAL DAILY
Status: CANCELLED | OUTPATIENT
Start: 2017-10-10

## 2017-10-09 RX ORDER — METOPROLOL TARTRATE 50 MG/1
50 TABLET, FILM COATED ORAL EVERY 12 HOURS SCHEDULED
Status: CANCELLED | OUTPATIENT
Start: 2017-10-09

## 2017-10-09 RX ORDER — METOPROLOL TARTRATE 50 MG/1
50 TABLET, FILM COATED ORAL EVERY 12 HOURS SCHEDULED
Status: DISCONTINUED | OUTPATIENT
Start: 2017-10-09 | End: 2017-10-13 | Stop reason: HOSPADM

## 2017-10-09 RX ORDER — CITALOPRAM 20 MG/1
20 TABLET ORAL DAILY
Status: DISCONTINUED | OUTPATIENT
Start: 2017-10-10 | End: 2017-10-13 | Stop reason: HOSPADM

## 2017-10-09 RX ORDER — WARFARIN SODIUM 3 MG/1
6 TABLET ORAL
Status: CANCELLED | OUTPATIENT
Start: 2017-10-09

## 2017-10-09 RX ORDER — ALPRAZOLAM 0.5 MG/1
0.5 TABLET ORAL NIGHTLY
Status: CANCELLED | OUTPATIENT
Start: 2017-10-09

## 2017-10-09 RX ORDER — ALPRAZOLAM 0.5 MG/1
0.5 TABLET ORAL NIGHTLY
Status: ON HOLD | COMMUNITY
End: 2017-11-08

## 2017-10-09 RX ADMIN — ACETAMINOPHEN 325 MG: 325 TABLET ORAL at 21:28

## 2017-10-09 RX ADMIN — FUROSEMIDE 20 MG: 20 TABLET ORAL at 17:46

## 2017-10-09 RX ADMIN — METOPROLOL TARTRATE 50 MG: 50 TABLET, FILM COATED ORAL at 21:25

## 2017-10-09 RX ADMIN — HYDRALAZINE HYDROCHLORIDE 100 MG: 50 TABLET ORAL at 21:25

## 2017-10-09 RX ADMIN — HYDRALAZINE HYDROCHLORIDE 100 MG: 50 TABLET ORAL at 14:51

## 2017-10-09 RX ADMIN — ALPRAZOLAM 0.5 MG: 0.5 TABLET ORAL at 21:25

## 2017-10-09 NOTE — PLAN OF CARE
Problem: Fluid Volume Excess (Adult,Obstetrics,Pediatric)  Goal: Identify Related Risk Factors and Signs and Symptoms  Outcome: Ongoing (interventions implemented as appropriate)  Goal: Stable Weight  Outcome: Ongoing (interventions implemented as appropriate)  Goal: Balanced Intake/Output  Outcome: Ongoing (interventions implemented as appropriate)

## 2017-10-09 NOTE — PLAN OF CARE
Problem: Pain, Acute (Adult)  Goal: Acceptable Pain Control/Comfort Level  Outcome: Ongoing (interventions implemented as appropriate)    Problem: Tissue Perfusion, Ineffective Peripheral (Adult)  Goal: Identify Related Risk Factors and Signs and Symptoms  Outcome: Ongoing (interventions implemented as appropriate)  Goal: Adequate Tissue Perfusion  Outcome: Ongoing (interventions implemented as appropriate)

## 2017-10-09 NOTE — PLAN OF CARE
Problem: Pressure Ulcer Risk (Diogo Q Scale) (Pediatric)  Goal: Skin Integrity  Outcome: Ongoing (interventions implemented as appropriate)

## 2017-10-09 NOTE — SIGNIFICANT NOTE
"   10/09/17 1500   Rehab Treatment   Discipline speech language pathologist   Rehab Evaluation   Evaluation Not Performed patient/family decline, not feeling well   Recommendations   SLP - Next Appointment 10/10/17     Consult received. Chart reviewed. Pt noted w/ recent hospital stays in June, July, and August. H/o CHF, arthritis, coronary artery disease, hypertension, SOB, and KARLO, per chart review.  SLP reports to pt's bedside to d/w pt MBS to evaluate swallowing fnx, determine safest/least restrictive po diet.  RN, Dianna TORRES, present at bedside throughout SLP discussion w/ pt.  Pt noted w/ O2 via NC.  Pt verbally reports to RN and SLP that pt often becomes \"strangled\" on both foods and drinks intermittently w/ po intake.  SLP d/w pt MBS protocol and procedures, however pt pleasantly declines MBS evaluation stating she is \"worn out\" and \"can't take any more today\".      SLP unable to make dietary recommendations towards pt's safest/least restrictive po diet at this time as pt pleasantly declines to participate this pm despite max cues from SLP.      SLP will f/u 10-10-17 w/ instrumental evaluation pending pt's ability and cooperation to functionally participate.    Thank you-  Linda Andrade M.A., CFY-SLP  "

## 2017-10-10 ENCOUNTER — APPOINTMENT (OUTPATIENT)
Dept: GENERAL RADIOLOGY | Facility: HOSPITAL | Age: 82
End: 2017-10-10

## 2017-10-10 PROCEDURE — G0378 HOSPITAL OBSERVATION PER HR: HCPCS

## 2017-10-10 PROCEDURE — G8997 SWALLOW GOAL STATUS: HCPCS

## 2017-10-10 PROCEDURE — G8996 SWALLOW CURRENT STATUS: HCPCS

## 2017-10-10 PROCEDURE — 92610 EVALUATE SWALLOWING FUNCTION: CPT

## 2017-10-10 PROCEDURE — G8998 SWALLOW D/C STATUS: HCPCS

## 2017-10-10 PROCEDURE — 71010 XR CHEST 1 VW: CPT | Performed by: RADIOLOGY

## 2017-10-10 PROCEDURE — 94799 UNLISTED PULMONARY SVC/PX: CPT

## 2017-10-10 PROCEDURE — 25010000002 FUROSEMIDE PER 20 MG: Performed by: INTERNAL MEDICINE

## 2017-10-10 PROCEDURE — 71010 HC CHEST PA OR AP: CPT

## 2017-10-10 RX ORDER — FUROSEMIDE 10 MG/ML
30 INJECTION INTRAMUSCULAR; INTRAVENOUS EVERY 12 HOURS
Status: COMPLETED | OUTPATIENT
Start: 2017-10-10 | End: 2017-10-11

## 2017-10-10 RX ADMIN — METOPROLOL TARTRATE 50 MG: 50 TABLET, FILM COATED ORAL at 08:47

## 2017-10-10 RX ADMIN — FUROSEMIDE 20 MG: 20 TABLET ORAL at 08:47

## 2017-10-10 RX ADMIN — FUROSEMIDE 30 MG: 10 INJECTION, SOLUTION INTRAMUSCULAR; INTRAVENOUS at 16:09

## 2017-10-10 RX ADMIN — HYDROCODONE BITARTRATE AND ACETAMINOPHEN 1 TABLET: 7.5; 325 TABLET ORAL at 23:01

## 2017-10-10 RX ADMIN — CHOLECALCIFEROL CAP 125 MCG (5000 UNIT) 5000 UNITS: 125 CAP at 08:47

## 2017-10-10 RX ADMIN — PANTOPRAZOLE SODIUM 40 MG: 40 TABLET, DELAYED RELEASE ORAL at 05:24

## 2017-10-10 RX ADMIN — HYDRALAZINE HYDROCHLORIDE 100 MG: 50 TABLET ORAL at 21:34

## 2017-10-10 RX ADMIN — HYDRALAZINE HYDROCHLORIDE 100 MG: 50 TABLET ORAL at 16:09

## 2017-10-10 RX ADMIN — ASPIRIN 81 MG: 81 TABLET ORAL at 08:48

## 2017-10-10 RX ADMIN — METOPROLOL TARTRATE 50 MG: 50 TABLET, FILM COATED ORAL at 21:34

## 2017-10-10 RX ADMIN — ALPRAZOLAM 0.5 MG: 0.5 TABLET ORAL at 21:34

## 2017-10-10 RX ADMIN — CITALOPRAM HYDROBROMIDE 20 MG: 20 TABLET ORAL at 08:47

## 2017-10-10 RX ADMIN — ISOSORBIDE MONONITRATE 60 MG: 60 TABLET, EXTENDED RELEASE ORAL at 08:48

## 2017-10-10 RX ADMIN — HYDRALAZINE HYDROCHLORIDE 100 MG: 50 TABLET ORAL at 05:23

## 2017-10-10 NOTE — PROGRESS NOTES
Discharge Planning Assessment  [facility]     Patient Name: Tre Kinney  MRN: [id number]  Today's Date: 10/10/2017    Admit Date: 10/9/2017          Discharge Needs Assessment       10/10/17 1417    Living Environment    Lives With child(abner), adult    Living Arrangements house    Home Accessibility no concerns    Type of Financial/Environmental Concern none    Transportation Available car    Living Environment    Provides Primary Care For no one, unable/limited ability to care for self    Primary Care Provided By child(abner) (specify)    Quality Of Family Relationships supportive    Able to Return to Prior Living Arrangements yes            Discharge Plan       10/10/17 1418    Case Management/Social Work Plan    Plan Pt admitted on 10/09/17.  SS received consult per Case Management for discharge planning.  SS spoke with pt on this date.  Pt lives at home with son and plans to return home at discharge.  Pt currently does not utilize home health services.  Pt currently utilizes home 02, hospital bed and walker via unknown provider.  Pt currently utilizes [facility] in [place].  Pt's PCP is Dr. Barone.  SS will follow and assist with discharge needs.     Patient/Family In Agreement With Plan yes        Discharge Placement     No information found                Demographic Summary       10/10/17 1417    Referral Information    Admission Type observation    Referral Source nursing    Reason For Consult discharge planning            Functional Status     None            Psychosocial     None            Abuse/Neglect     None            Legal     None            Substance Abuse     None            Patient Forms     None          Mary Salgado

## 2017-10-10 NOTE — THERAPY EVALUATION
Acute Care - Speech Language Pathology   Swallow Initial Evaluation  [facility]   CLINICAL DYSPHAGIA EVALUATION     Patient Name: Tre Kinney  : 1932  MRN: [id number]  Today's Date: 10/10/2017             Admit Date: 10/9/2017   Tre Kinney  is seen at bedside this pm on 3S to assess safety/efficacy of swallowing fnx, determine safest/least restrictive diet tolerance. She was admitted 10/9/17 w/ CHF.     Social History     Social History   • Marital status:      Spouse name: N/A   • Number of children: N/A   • Years of education: N/A     Occupational History   • Not on file.     Social History Main Topics   • Smoking status: Never Smoker   • Smokeless tobacco: Not on file   • Alcohol use No   • Drug use: No   • Sexual activity: Defer     Other Topics Concern   • Not on file     Social History Narrative    Chest xray not available for review.     Diet Orders (active)     Start     Ordered    10/09/17 1154  Diet Regular  Diet Effective Now      10/09/17 1205        Observed on 2L O2 via NC.    Ms. Kinney is positioned upright and centered in bed to accept multiple po presentations of ice chips, solid cracker, puree, and thin liquids via spoon, cup and straw.  She is able to self feed.     Facial/oral structures are symmetrical upon observation. Lingual protrusion reveals no deviation. Oral mucosa are moist, pink, and clean. Secretions are clear, thin, and well controlled. OROM/REE is wfl to imitate oral postures. Gag is not assessed. Volitional cough is intact w/ adequate  intensity, clear in quality, non-productive. Voice is adequate in intensity, clear in quality w/ intelligible speech.    Upon po presentations, adequate bolus anticipation and acceptance w/ good labial seal for bolus clearance via spoon bowl, cup rim stability and suction via straw. Bolus formation, manipulation and control are wfl w/ rotary mastication pattern. A-p transit is timely w/o oral residue. No overt s/s aspiration  before the swallow.     Pharyngeal swallow is timely w/ adequate hyolaryngeal elevation per palpation. No overt s/s aspiration evidenced across this evaluation. No silent aspiration suspected as pt is w/o changes in vocal quality, respirations or secretions post po presentations. She denies odynophagia.    Visit Dx:   No diagnosis found.  Patient Active Problem List   Diagnosis   • Acid reflux   • Ankle arthralgia   • Cardiac conduction disorder   • Closed fracture of distal fibula   • Arteriosclerosis of coronary artery   • Fracture of distal end of tibia   • Elevated cholesterol   • BP (high blood pressure)   • Hypertrophic polyarthritis   • OP (osteoporosis)   • Right heart failure   • Heart failure, chronic, right-sided   • SOB (shortness of breath)   • Palpitations   • Acute CHF     Past Medical History:   Diagnosis Date   • Arthritis    • CHF (congestive heart failure)    • Coronary artery disease    • H/O blood clots    • History of transfusion     no reaction    • Hypertension      Past Surgical History:   Procedure Laterality Date   • ABDOMINAL SURGERY     • APPENDECTOMY     • CARDIAC SURGERY     •  SECTION     • CORONARY ANGIOPLASTY WITH STENT PLACEMENT     • PACEMAKER IMPLANTATION      left side    • SKIN BIOPSY     • TUBAL ABDOMINAL LIGATION       EDUCATION  The patient has been educated in the following areas:   Oral Care/Hydration.    Impression: Ms. Kinney presents w/ wfl oropharyngeal swallow w/o s/s aspiration.No s/s indicative of silent aspiration.  No odynophagia reported.      SLP Recommendation and Plan    1. Continue regular consistency diet, thin liquids.    2. Meds whole in puree/thins.   3. Upright and centered for all po intake  4. KARLO precautions.  5. Oral care protocol.  No further formal SLP f/u warranted at this time.    D/w pt results and recommendations w/ verbal agreement.    Thank you for allowing me to participate in the care of your patient-  Genoveva Jesus M.S.,  CCC/SLP                                               Time Calculation:   Therapy Charges for Today     Code Description Service Date Service Provider Modifiers Qty    26664525724 HC ST SWALLOWING CURRENT STATUS 10/10/2017 Genoveva Jesus, MS CCC-SLP GN, CH 1    [id number] HC ST SWALLOWING PROJECTED 10/10/2017 Genoveva Jesus MS CCC-SLP GN, CH 1    [id number] HC ST SWALLOWING DISCHARGE 10/10/2017 Genoveva Jesus MS CCC-SLP GN, CH 1    [id number] HC ST EVAL ORAL PHARYNG SWALLOW 3 10/10/2017 Genoveva Jesus MS CCC-SLP GN 1        SLP G-Codes  SLP NOMS Used?: Yes  Functional Limitations: Swallowing  Swallow Current Status (): 0 percent impaired, limited or restricted  Swallow Goal Status (): 0 percent impaired, limited or restricted  Swallow Discharge Status (): 0 percent impaired, limited or restricted    Genoveva Jesus MS CCC-SLP  10/10/2017

## 2017-10-10 NOTE — PLAN OF CARE
Problem: Patient Care Overview (Adult)  Goal: Plan of Care Review  Outcome: Ongoing (interventions implemented as appropriate)    Problem: Pain, Acute (Adult)  Goal: Acceptable Pain Control/Comfort Level  Outcome: Ongoing (interventions implemented as appropriate)    Problem: Tissue Perfusion, Ineffective Peripheral (Adult)  Goal: Identify Related Risk Factors and Signs and Symptoms  Outcome: Ongoing (interventions implemented as appropriate)  Goal: Adequate Tissue Perfusion  Outcome: Ongoing (interventions implemented as appropriate)    Problem: Fluid Volume Excess (Adult,Obstetrics,Pediatric)  Goal: Identify Related Risk Factors and Signs and Symptoms  Outcome: Ongoing (interventions implemented as appropriate)  Goal: Stable Weight  Outcome: Ongoing (interventions implemented as appropriate)  Goal: Balanced Intake/Output  Outcome: Ongoing (interventions implemented as appropriate)    Problem: Pressure Ulcer Risk (Diogo Q Scale) (Pediatric)  Goal: Identify Related Risk Factors and Signs and Symptoms  Outcome: Ongoing (interventions implemented as appropriate)  Goal: Skin Integrity  Outcome: Ongoing (interventions implemented as appropriate)

## 2017-10-10 NOTE — PROGRESS NOTES
Tre Kinney 84 y.o.   10/10/17    Subjective: Patient presented with increasing shortness of breath and increasing edema  Objective: Vital signs stable chest bilateral crackles extremities with +3 edema  Vital Signs (last 72 hrs)       10/06 0700  -  10/07 0659 10/07 0700  -  10/08 0659 10/08 0700  -  10/09 0659 10/09 0700  -  10/10 0640   Most Recent    Temp (°F)       97.7 -  98.3     98.3 (36.8)    Heart Rate       77 -  84     80    Resp         20     20    BP       128/78 -  157/93     132/75    SpO2 (%)       96 -  99     97        Lab Results (last 72 hours)     Procedure Component Value Units Date/Time    Comprehensive Metabolic Panel [[id number]]  (Abnormal) Collected:  10/09/17 1220    Specimen:  Blood Updated:  10/09/17 1301     Glucose 109 mg/dL      BUN 40 (H) mg/dL      Creatinine 2.08 (H) mg/dL      Sodium 137 mmol/L      Potassium 3.8 mmol/L      Chloride 102 mmol/L      CO2 24.0 (L) mmol/L      Calcium 9.1 mg/dL      Total Protein 6.2 g/dL      Albumin 3.70 g/dL      ALT (SGPT) 19 U/L      AST (SGOT) 22 U/L      Alkaline Phosphatase 44 U/L       Note New Reference Ranges        Total Bilirubin 0.7 mg/dL      eGFR Non African Amer 23 (L) mL/min/1.73      Globulin 2.5 gm/dL      A/G Ratio 1.5 g/dL      BUN/Creatinine Ratio 19.2     Anion Gap 11.0 mmol/L     Narrative:       The MDRD GFR formula is only valid for adults with stable renal function between ages 18 and 70.    Osmolality, Calculated [[id number]]  (Normal) Collected:  10/09/17 1220    Specimen:  Blood Updated:  10/09/17 1301     Osmolality Calc 284.2 mOsm/kg     BNP [[id number]]  (Abnormal) Collected:  10/09/17 1220    Specimen:  Blood Updated:  10/09/17 1308     BNP 1885.0 (H) pg/mL     Troponin [[id number]]  (Normal) Collected:  10/09/17 1801    Specimen:  Blood Updated:  10/1934     Troponin I 0.039 ng/mL     Narrative:       Ultra Troponin I Reference Range:         <=0.039 ng/mL: Negative    0.04-0.779 ng/mL: Indeterminate Range.  Suspicious of MI.  Clinical correlation required.       >=0.78  ng/mL: Consistent with myocardial injury.  Clinical correlation required.        Imaging Results (last 24 hours)     ** No results found for the last 24 hours. **        Assessment:Active Problems:    Acute CHF   Acute on chronic kidney disease with volume overload  Plan: We'll obtain assistance from a nephrology for management of of renal issues and diuretics

## 2017-10-10 NOTE — PLAN OF CARE
Problem: Tissue Perfusion, Ineffective Peripheral (Adult)  Goal: Adequate Tissue Perfusion  Outcome: Ongoing (interventions implemented as appropriate)    Problem: Pressure Ulcer Risk (Diogo Q Scale) (Pediatric)  Goal: Identify Related Risk Factors and Signs and Symptoms  Outcome: Ongoing (interventions implemented as appropriate)  Goal: Skin Integrity  Outcome: Ongoing (interventions implemented as appropriate)    Problem: Fall Risk (Adult)  Goal: Identify Related Risk Factors and Signs and Symptoms  Outcome: Ongoing (interventions implemented as appropriate)  Goal: Absence of Falls  Outcome: Ongoing (interventions implemented as appropriate)

## 2017-10-11 LAB
ANION GAP SERPL CALCULATED.3IONS-SCNC: 7.3 MMOL/L (ref 3.6–11.2)
BUN BLD-MCNC: 43 MG/DL (ref 7–21)
BUN/CREAT SERPL: 20.9 (ref 7–25)
CALCIUM SPEC-SCNC: 9.1 MG/DL (ref 7.7–10)
CHLORIDE SERPL-SCNC: 106 MMOL/L (ref 99–112)
CO2 SERPL-SCNC: 27.7 MMOL/L (ref 24.3–31.9)
CREAT BLD-MCNC: 2.06 MG/DL (ref 0.43–1.29)
GFR SERPL CREATININE-BSD FRML MDRD: 23 ML/MIN/1.73
GLUCOSE BLD-MCNC: 110 MG/DL (ref 70–110)
OSMOLALITY SERPL CALC.SUM OF ELEC: 292.7 MOSM/KG (ref 273–305)
POTASSIUM BLD-SCNC: 3.8 MMOL/L (ref 3.5–5.3)
SODIUM BLD-SCNC: 141 MMOL/L (ref 135–153)

## 2017-10-11 PROCEDURE — 94799 UNLISTED PULMONARY SVC/PX: CPT

## 2017-10-11 PROCEDURE — 25010000002 FUROSEMIDE PER 20 MG: Performed by: INTERNAL MEDICINE

## 2017-10-11 PROCEDURE — 93010 ELECTROCARDIOGRAM REPORT: CPT | Performed by: INTERNAL MEDICINE

## 2017-10-11 PROCEDURE — 80048 BASIC METABOLIC PNL TOTAL CA: CPT | Performed by: INTERNAL MEDICINE

## 2017-10-11 PROCEDURE — 93005 ELECTROCARDIOGRAM TRACING: CPT | Performed by: FAMILY MEDICINE

## 2017-10-11 RX ORDER — FUROSEMIDE 10 MG/ML
30 INJECTION INTRAMUSCULAR; INTRAVENOUS EVERY 12 HOURS
Status: COMPLETED | OUTPATIENT
Start: 2017-10-11 | End: 2017-10-11

## 2017-10-11 RX ADMIN — METOPROLOL TARTRATE 50 MG: 50 TABLET, FILM COATED ORAL at 20:28

## 2017-10-11 RX ADMIN — HYDRALAZINE HYDROCHLORIDE 100 MG: 50 TABLET ORAL at 13:52

## 2017-10-11 RX ADMIN — ASPIRIN 81 MG: 81 TABLET ORAL at 08:40

## 2017-10-11 RX ADMIN — FUROSEMIDE 30 MG: 10 INJECTION, SOLUTION INTRAMUSCULAR; INTRAVENOUS at 23:57

## 2017-10-11 RX ADMIN — ALPRAZOLAM 0.5 MG: 0.5 TABLET ORAL at 20:29

## 2017-10-11 RX ADMIN — HYDRALAZINE HYDROCHLORIDE 100 MG: 50 TABLET ORAL at 22:17

## 2017-10-11 RX ADMIN — HYDRALAZINE HYDROCHLORIDE 100 MG: 50 TABLET ORAL at 05:50

## 2017-10-11 RX ADMIN — PANTOPRAZOLE SODIUM 40 MG: 40 TABLET, DELAYED RELEASE ORAL at 05:50

## 2017-10-11 RX ADMIN — FUROSEMIDE 30 MG: 10 INJECTION, SOLUTION INTRAMUSCULAR; INTRAVENOUS at 03:22

## 2017-10-11 RX ADMIN — ISOSORBIDE MONONITRATE 60 MG: 60 TABLET, EXTENDED RELEASE ORAL at 08:40

## 2017-10-11 RX ADMIN — METOPROLOL TARTRATE 50 MG: 50 TABLET, FILM COATED ORAL at 08:40

## 2017-10-11 RX ADMIN — FUROSEMIDE 30 MG: 10 INJECTION, SOLUTION INTRAMUSCULAR; INTRAVENOUS at 12:10

## 2017-10-11 RX ADMIN — CITALOPRAM HYDROBROMIDE 20 MG: 20 TABLET ORAL at 08:40

## 2017-10-11 RX ADMIN — CHOLECALCIFEROL CAP 125 MCG (5000 UNIT) 5000 UNITS: 125 CAP at 08:40

## 2017-10-11 NOTE — CONSULTS
Nephrology Consult Note    Referring Provider: Dr Barone  Reason for Consultation: CKD 3 with volume overload    Subjective       History of present illness:  Tre Kinney is a 84 y.o. female who presented to [facility] emergency department with chief complaint of shortness of breath on exertion and worsening elg edema for 2 weeks. She lives wit her son and admits to eating hig sodium diet ( pringles, canned food ) for few weeks. She denies nausea or vomiting. She has baseline CKD 3 with creatinine 1.6-1.9 and creatinine was 2 on admission ( 10//9/17). I had started her on lasix 30 mg iv bid last night and she feels significantly improved. Breathing is better and edema is improving. She denies nausea, vomiting or chest pain. She denies fever, chills or cough. No bruising, epistaxis. She has failed ot keep outpatient nephrology appointment as she doesn't have transportation.   no Chronic NSAIDS use. Patient denies hematuria, dysuria, difficulty passing urine.     History  Past Medical History:   Diagnosis Date   • Arthritis    • CHF (congestive heart failure)    • Coronary artery disease    • H/O blood clots    • History of transfusion     no reaction    • Hypertension    , Past Surgical History:   Procedure Laterality Date   • ABDOMINAL SURGERY     • APPENDECTOMY     • CARDIAC SURGERY     •  SECTION     • CORONARY ANGIOPLASTY WITH STENT PLACEMENT     • PACEMAKER IMPLANTATION      left side    • SKIN BIOPSY     • TUBAL ABDOMINAL LIGATION     , Family History   Problem Relation Age of Onset   • Other Mother      cardiac disorder   • Hypertension Mother    • Heart disease Mother    • Thyroid disease Mother    • Other Father      cardiac disorder   • Heart disease Father    • Hypertension Father    • Other Sister      cardiac disorder   • Other Brother      cardiac disorder   • Other Other      cardiac disorder   • Other Other      cardiac disorder   , Social History   Substance Use Topics   • Smoking  status: Never Smoker   • Smokeless tobacco: None   • Alcohol use No   , Prescriptions Prior to Admission   Medication Sig Dispense Refill Last Dose   • ALPRAZolam (XANAX) 0.5 MG tablet Take 0.5 mg by mouth Every Night.   10/8/2017 at Unknown time   • aspirin 81 MG EC tablet Take 1 tablet by mouth Daily. 30 tablet 0 10/9/2017 at 0730   • Cholecalciferol (VITAMIN D3) 5000 UNITS capsule capsule Take 1 capsule by mouth Daily. 30 capsule 5 10/9/2017 at 0730   • citalopram (CELEXA) 20 MG tablet Take 20 mg by mouth Daily.   10/9/2017 at 0730   • furosemide (LASIX) 20 MG tablet Take 20 mg by mouth 2 (Two) Times a Day.   10/9/2017 at 0730   • hydrALAZINE (APRESOLINE) 100 MG tablet Take 1 tablet by mouth Every 8 (Eight) Hours. 90 tablet 0 10/9/2017 at 0730   • HYDROcodone-acetaminophen (NORCO) 7.5-325 MG per tablet Take 1 tablet by mouth 2 (Two) Times a Day As Needed for Moderate Pain .   10/9/2017 at 0730   • isosorbide mononitrate (IMDUR) 60 MG 24 hr tablet Take 60 mg by mouth Daily.   10/9/2017 at 0730   • metoprolol tartrate (LOPRESSOR) 50 MG tablet Take 50 mg by mouth 2 (Two) Times a Day.   10/9/2017 at 0730   • pantoprazole (PROTONIX) 40 MG EC tablet Take 40 mg by mouth Daily.   10/9/2017 at 0730   • warfarin (COUMADIN) 6 MG tablet Take 6 mg by mouth Daily.   10/9/2017 at 0730   , Scheduled Meds:    ALPRAZolam 0.5 mg Oral Nightly   aspirin 81 mg Oral Daily   citalopram 20 mg Oral Daily   hydrALAZINE 100 mg Oral Q8H   isosorbide mononitrate 60 mg Oral Daily   metoprolol tartrate 50 mg Oral Q12H   pantoprazole 40 mg Oral Q AM   vitamin D3 5,000 Units Oral Daily   , Continuous Infusions:   , PRN Meds:  •  acetaminophen  •  HYDROcodone-acetaminophen  •  nitroglycerin  •  sodium chloride and Allergies:  Latex    Review of Systems  More than 10 point review if systems was done. Pertinent items are noted in HPI, all other systems reviewed and negative    Objective     Vital Signs  Temp:  [97.6 °F (36.4 °C)-98.4 °F (36.9 °C)]  97.6 °F (36.4 °C)  Heart Rate:  [78-80] 80  Resp:  [18-20] 20  BP: (120-152)/(63-89) 152/87    I/O this shift:  In: 240 [P.O.:240]  Out: 300 [Urine:300]  I/O last 3 completed shifts:  In: 1200 [P.O.:1200]  Out: 2550 [Urine:2550]    Physical Examination:    General Appearance : alert, appears stated age, cooperative and no distress  Head : normocephalic, without obvious abnormality and atraumatic  Eyes : conjunctivae and sclerae normal, no icterus, no pallor and PERRLA  Throat : oral mucosa moist  Neck: no adenopathy, suppple, no carotid bruit and no JVD  Lungs : reduced breath sounds at bases  Heart : regular rhythm & normal rate, normal S1, S2, no murmur, no david, no rub   Abdomen :  normal bowel sounds, no masses and soft non-tender  Rectal : Deferred  Extremities : moves extremities well, no redness and 1+ edema  Pulses :  palpable and equal bilaterally  Skin : no bleeding, bruising or rash  Neurologic : orientated to person, place, time, grossly no focal deficitis    Laboratory Data :      WBC No results found for: WBC   HGB No results found for: HGB   HCT No results found for: HCT   Platlets No results found for: LABPLAT   MCV No results found for: MCV       Sodium Sodium   Date Value Ref Range Status   10/11/2017 141 135 - 153 mmol/L Final   10/09/2017 137 135 - 153 mmol/L Final      Potassium Potassium   Date Value Ref Range Status   10/11/2017 3.8 3.5 - 5.3 mmol/L Final   10/09/2017 3.8 3.5 - 5.3 mmol/L Final      Chloride Chloride   Date Value Ref Range Status   10/11/2017 106 99 - 112 mmol/L Final   10/09/2017 102 99 - 112 mmol/L Final      CO2 CO2   Date Value Ref Range Status   10/11/2017 27.7 24.3 - 31.9 mmol/L Final   10/09/2017 24.0 (L) 24.3 - 31.9 mmol/L Final      BUN BUN   Date Value Ref Range Status   10/11/2017 43 (H) 7 - 21 mg/dL Final   10/09/2017 40 (H) 7 - 21 mg/dL Final      Creatinine Creatinine   Date Value Ref Range Status   10/11/2017 2.06 (H) 0.43 - 1.29 mg/dL Final   10/09/2017 2.08  (H) 0.43 - 1.29 mg/dL Final      Calcium Calcium   Date Value Ref Range Status   10/11/2017 9.1 7.7 - 10.0 mg/dL Final   10/09/2017 9.1 7.7 - 10.0 mg/dL Final      PO4 No results found for: CAPO4   Albumin Albumin   Date Value Ref Range Status   10/09/2017 3.70 3.40 - 4.80 g/dL Final      Magnesium No results found for: MG   Uric Acid No results found for: URICACID     Radiology results :     Imaging Results (last 72 hours)     Procedure Component Value Units Date/Time    XR Chest 1 View [[id number]] Collected:  10/11/17 0939     Updated:  10/11/17 1017    Narrative:       XR CHEST 1 VIEW-     CLINICAL INDICATION: Congestive heart failure.          COMPARISON: 08/03/2017      TECHNIQUE: Single frontal view of the chest.     FINDINGS:     Left effusion and bibasilar consolidation.  The cardiac silhouette is normal. The pulmonary vasculature is  unremarkable.  There is no evidence of an acute osseous abnormality.   There are no suspicious-appearing parenchymal soft tissue nodules.            Impression:       Left effusion and bibasilar consolidation. Fullness in the right  perihilar region.         This report was finalized on 10/11/2017 10:15 AM by Dr. Bob Lovell MD.               Medications:        ALPRAZolam 0.5 mg Oral Nightly   aspirin 81 mg Oral Daily   citalopram 20 mg Oral Daily   hydrALAZINE 100 mg Oral Q8H   isosorbide mononitrate 60 mg Oral Daily   metoprolol tartrate 50 mg Oral Q12H   pantoprazole 40 mg Oral Q AM   vitamin D3 5,000 Units Oral Daily          Assessment/Plan     Active Problems:    Acute CHF      1. CKD 3 : her baseline creatinine is 1.6-1.9 and it is stable at 2 today.  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 Avoid nephrotoxins    2. Systolic CHF exacerbation : better, I will continue lasix 30 mg iv bid today and also consult nutrition for low sodium diet education. CXR obtained and reviewed personally and shows left pleural  effusion with possible bibasilar consolidation    3. HTN : stable    4. Anemia : check iron profile    Thanks Dr Barone for the consult. We will follow with you.  I discussed the patients findings and my recommendations with patient and nursing staff    Sebas Ellis MD  10/11/17  11:12 AM

## 2017-10-11 NOTE — PLAN OF CARE
Problem: Tissue Perfusion, Ineffective Peripheral (Adult)  Goal: Adequate Tissue Perfusion  Outcome: Ongoing (interventions implemented as appropriate)    Problem: Fluid Volume Excess (Adult,Obstetrics,Pediatric)  Goal: Identify Related Risk Factors and Signs and Symptoms  Outcome: Ongoing (interventions implemented as appropriate)    Problem: Fall Risk (Adult)  Goal: Absence of Falls  Outcome: Ongoing (interventions implemented as appropriate)

## 2017-10-11 NOTE — PLAN OF CARE
Problem: Patient Care Overview (Adult)  Goal: Plan of Care Review  Outcome: Ongoing (interventions implemented as appropriate)  Goal: Adult Individualization and Mutuality  Outcome: Ongoing (interventions implemented as appropriate)  Goal: Discharge Needs Assessment  Outcome: Ongoing (interventions implemented as appropriate)    Problem: Pain, Acute (Adult)  Goal: Identify Related Risk Factors and Signs and Symptoms  Outcome: Ongoing (interventions implemented as appropriate)  Goal: Acceptable Pain Control/Comfort Level  Outcome: Ongoing (interventions implemented as appropriate)    Problem: Tissue Perfusion, Ineffective Peripheral (Adult)  Goal: Identify Related Risk Factors and Signs and Symptoms  Outcome: Ongoing (interventions implemented as appropriate)  Goal: Adequate Tissue Perfusion  Outcome: Ongoing (interventions implemented as appropriate)    Problem: Fluid Volume Excess (Adult,Obstetrics,Pediatric)  Goal: Identify Related Risk Factors and Signs and Symptoms  Outcome: Ongoing (interventions implemented as appropriate)  Goal: Stable Weight  Outcome: Ongoing (interventions implemented as appropriate)  Goal: Balanced Intake/Output  Outcome: Ongoing (interventions implemented as appropriate)    Problem: Pressure Ulcer Risk (Diogo Q Scale) (Pediatric)  Goal: Identify Related Risk Factors and Signs and Symptoms  Outcome: Ongoing (interventions implemented as appropriate)  Goal: Skin Integrity  Outcome: Ongoing (interventions implemented as appropriate)    Problem: Fall Risk (Adult)  Goal: Identify Related Risk Factors and Signs and Symptoms  Outcome: Ongoing (interventions implemented as appropriate)  Goal: Absence of Falls  Outcome: Ongoing (interventions implemented as appropriate)

## 2017-10-11 NOTE — PROGRESS NOTES
Tre Kinney 84 y.o.   10/11/17    Subjective: Patient states she is feeling better and breathing easier on Lasix.  Objective: Vital signs stable has crackles in both lungs  Vital Signs (last 72 hrs)       10/07 0700  -  10/08 0659 10/08 0700  -  10/09 0659 10/09 0700  -  10/10 0659 10/10 0700  -  10/11 0640   Most Recent    Temp (°F)     97.7 -  98.3    97.7 -  98.4     97.9 (36.6)    Heart Rate     77 -  84    78 -  79     79    Resp       20    18 -  20     20    BP     128/78 -  157/93    120/63 -  152/80     152/80    SpO2 (%)     96 -  99    95 -  99     95        Lab Results (last 72 hours)     Procedure Component Value Units Date/Time    Comprehensive Metabolic Panel [[id number]]  (Abnormal) Collected:  10/09/17 1220    Specimen:  Blood Updated:  10/09/17 1301     Glucose 109 mg/dL      BUN 40 (H) mg/dL      Creatinine 2.08 (H) mg/dL      Sodium 137 mmol/L      Potassium 3.8 mmol/L      Chloride 102 mmol/L      CO2 24.0 (L) mmol/L      Calcium 9.1 mg/dL      Total Protein 6.2 g/dL      Albumin 3.70 g/dL      ALT (SGPT) 19 U/L      AST (SGOT) 22 U/L      Alkaline Phosphatase 44 U/L       Note New Reference Ranges        Total Bilirubin 0.7 mg/dL      eGFR Non African Amer 23 (L) mL/min/1.73      Globulin 2.5 gm/dL      A/G Ratio 1.5 g/dL      BUN/Creatinine Ratio 19.2     Anion Gap 11.0 mmol/L     Narrative:       The MDRD GFR formula is only valid for adults with stable renal function between ages 18 and 70.    Osmolality, Calculated [[id number]]  (Normal) Collected:  10/09/17 1220    Specimen:  Blood Updated:  10/09/17 1301     Osmolality Calc 284.2 mOsm/kg     BNP [[id number]]  (Abnormal) Collected:  10/09/17 1220    Specimen:  Blood Updated:  10/09/17 1308     BNP 1885.0 (H) pg/mL     Troponin [[id number]]  (Normal) Collected:  10/09/17 1801    Specimen:  Blood Updated:  10/1934     Troponin I 0.039 ng/mL     Narrative:       Ultra Troponin I Reference Range:         <=0.039 ng/mL: Negative     0.04-0.779 ng/mL: Indeterminate Range. Suspicious of MI.  Clinical correlation required.       >=0.78  ng/mL: Consistent with myocardial injury.  Clinical correlation required.    Basic Metabolic Panel [[id number]]  (Abnormal) Collected:  10/11/17 0044    Specimen:  Blood Updated:  10/11/17 0156     Glucose 110 mg/dL      BUN 43 (H) mg/dL      Creatinine 2.06 (H) mg/dL      Sodium 141 mmol/L      Potassium 3.8 mmol/L      Chloride 106 mmol/L      CO2 27.7 mmol/L      Calcium 9.1 mg/dL      eGFR Non African Amer 23 (L) mL/min/1.73      BUN/Creatinine Ratio 20.9     Anion Gap 7.3 mmol/L     Narrative:       The MDRD GFR formula is only valid for adults with stable renal function between ages 18 and 70.    Osmolality, Calculated [[id number]]  (Normal) Collected:  10/11/17 0044    Specimen:  Blood Updated:  10/11/17 0156     Osmolality Calc 292.7 mOsm/kg         Imaging Results (last 24 hours)     Procedure Component Value Units Date/Time    XR Chest 1 View [[id number]] Updated:  10/10/17 2227        Assessment:Active Problems:    Acute CHF   Chronic kidney disease  Plan: Continue present treatment continue present treatment

## 2017-10-11 NOTE — PROGRESS NOTES
Nutrition Services    Patient Name:  Tre Kinney  YOB: 1932  MRN: [id number]  Admit Date:  10/9/2017    MD consult received for education.  Will change diet to cardiac per protocol.      Thank you    Electronically signed by:  Sherlyn Avendaño RD  10/11/17 4:47 PM

## 2017-10-11 NOTE — NURSING NOTE
[facility] ADMISSION NOTE:  Patient enrolled in the [facility] program to assist with education and support during transition home from hospital. [facility] home  will see patient at home within 48 hours of discharge and will follow up with patient in home and telephonically for 6 weeks post discharge under the Myles Model.    Clinical Assessment Instrument Scores:  Short Portable Mental Status Questionnaire-9  Geriatric Depression Scale-4  Instrumental Activities of Daily Living-2  PABON Activities of Daily Living-5  Overall Quality of Life- 1  Subjective Health Rating- 0  Symptom Bother Scale-23  Generalized Anxiety Scale-8  Health Literacy Test- 7

## 2017-10-12 LAB
ANION GAP SERPL CALCULATED.3IONS-SCNC: 7.3 MMOL/L (ref 3.6–11.2)
BUN BLD-MCNC: 51 MG/DL (ref 7–21)
BUN/CREAT SERPL: 25.5 (ref 7–25)
CALCIUM SPEC-SCNC: 9.1 MG/DL (ref 7.7–10)
CHLORIDE SERPL-SCNC: 104 MMOL/L (ref 99–112)
CO2 SERPL-SCNC: 29.7 MMOL/L (ref 24.3–31.9)
CREAT BLD-MCNC: 2 MG/DL (ref 0.43–1.29)
FERRITIN SERPL-MCNC: 24 NG/ML (ref 10–290.3)
GFR SERPL CREATININE-BSD FRML MDRD: 24 ML/MIN/1.73
GLUCOSE BLD-MCNC: 103 MG/DL (ref 70–110)
IRON 24H UR-MRATE: 30 MCG/DL (ref 49–151)
IRON SATN MFR SERPL: 8 % (ref 15–50)
OSMOLALITY SERPL CALC.SUM OF ELEC: 295.2 MOSM/KG (ref 273–305)
POTASSIUM BLD-SCNC: 3.8 MMOL/L (ref 3.5–5.3)
SODIUM BLD-SCNC: 141 MMOL/L (ref 135–153)
TIBC SERPL-MCNC: 355 MCG/DL (ref 241–421)

## 2017-10-12 PROCEDURE — 80048 BASIC METABOLIC PNL TOTAL CA: CPT | Performed by: INTERNAL MEDICINE

## 2017-10-12 PROCEDURE — 82728 ASSAY OF FERRITIN: CPT | Performed by: INTERNAL MEDICINE

## 2017-10-12 PROCEDURE — 83540 ASSAY OF IRON: CPT | Performed by: INTERNAL MEDICINE

## 2017-10-12 PROCEDURE — 83550 IRON BINDING TEST: CPT | Performed by: INTERNAL MEDICINE

## 2017-10-12 PROCEDURE — 94799 UNLISTED PULMONARY SVC/PX: CPT

## 2017-10-12 RX ORDER — IRON POLYSACCHARIDE COMPLEX 150 MG
150 CAPSULE ORAL DAILY
Status: DISCONTINUED | OUTPATIENT
Start: 2017-10-12 | End: 2017-10-13 | Stop reason: HOSPADM

## 2017-10-12 RX ORDER — FUROSEMIDE 40 MG/1
40 TABLET ORAL DAILY
Status: DISCONTINUED | OUTPATIENT
Start: 2017-10-12 | End: 2017-10-12

## 2017-10-12 RX ORDER — FUROSEMIDE 40 MG/1
40 TABLET ORAL DAILY
Status: DISCONTINUED | OUTPATIENT
Start: 2017-10-12 | End: 2017-10-13 | Stop reason: HOSPADM

## 2017-10-12 RX ADMIN — PANTOPRAZOLE SODIUM 40 MG: 40 TABLET, DELAYED RELEASE ORAL at 05:13

## 2017-10-12 RX ADMIN — CHOLECALCIFEROL CAP 125 MCG (5000 UNIT) 5000 UNITS: 125 CAP at 08:23

## 2017-10-12 RX ADMIN — METOPROLOL TARTRATE 50 MG: 50 TABLET, FILM COATED ORAL at 08:22

## 2017-10-12 RX ADMIN — HYDRALAZINE HYDROCHLORIDE 100 MG: 50 TABLET ORAL at 05:13

## 2017-10-12 RX ADMIN — ISOSORBIDE MONONITRATE 60 MG: 60 TABLET, EXTENDED RELEASE ORAL at 08:22

## 2017-10-12 RX ADMIN — HYDRALAZINE HYDROCHLORIDE 100 MG: 50 TABLET ORAL at 22:29

## 2017-10-12 RX ADMIN — FUROSEMIDE 40 MG: 40 TABLET ORAL at 15:53

## 2017-10-12 RX ADMIN — Medication 150 MG: at 14:18

## 2017-10-12 RX ADMIN — ALPRAZOLAM 0.5 MG: 0.5 TABLET ORAL at 20:42

## 2017-10-12 RX ADMIN — CITALOPRAM HYDROBROMIDE 20 MG: 20 TABLET ORAL at 08:22

## 2017-10-12 RX ADMIN — ASPIRIN 81 MG: 81 TABLET ORAL at 08:22

## 2017-10-12 RX ADMIN — METOPROLOL TARTRATE 50 MG: 50 TABLET, FILM COATED ORAL at 20:38

## 2017-10-12 RX ADMIN — HYDROCODONE BITARTRATE AND ACETAMINOPHEN 1 TABLET: 7.5; 325 TABLET ORAL at 20:42

## 2017-10-12 RX ADMIN — HYDRALAZINE HYDROCHLORIDE 100 MG: 50 TABLET ORAL at 14:44

## 2017-10-12 NOTE — PLAN OF CARE
Problem: Patient Care Overview (Adult)  Goal: Plan of Care Review  Outcome: Ongoing (interventions implemented as appropriate)    Problem: Pain, Acute (Adult)  Goal: Acceptable Pain Control/Comfort Level  Outcome: Ongoing (interventions implemented as appropriate)    Problem: Tissue Perfusion, Ineffective Peripheral (Adult)  Goal: Adequate Tissue Perfusion  Outcome: Ongoing (interventions implemented as appropriate)    Problem: Fluid Volume Excess (Adult,Obstetrics,Pediatric)  Goal: Stable Weight  Outcome: Ongoing (interventions implemented as appropriate)  Goal: Balanced Intake/Output  Outcome: Ongoing (interventions implemented as appropriate)    Problem: Pressure Ulcer Risk (Diogo Q Scale) (Pediatric)  Goal: Skin Integrity  Outcome: Ongoing (interventions implemented as appropriate)    Problem: Fall Risk (Adult)  Goal: Identify Related Risk Factors and Signs and Symptoms  Outcome: Ongoing (interventions implemented as appropriate)  Goal: Absence of Falls  Outcome: Ongoing (interventions implemented as appropriate)

## 2017-10-12 NOTE — PROGRESS NOTES
Tre Gregoria 84 y.o.   10/12/17    Subjective: Patient is breathing easier feels better  Objective: Signs stable crackles or bases of both lungs positive edema stick secondary to venous stasis in the legs  Vital Signs (last 72 hrs)       10/08 0700  -  10/09 0659 10/09 0700  -  10/10 0659 10/10 0700  -  10/11 0659 10/11 0700  -  10/12 0639   Most Recent    Temp (°F)   97.7 -  98.3    97.7 -  98.4    97.6 -  98.2     98.1 (36.7)    Heart Rate   77 -  84    78 -  79    75 -  83     83    Resp     20    18 -  20      20     20    BP   128/78 -  157/93    120/63 -  152/80    143/86 -  161/90     147/85    SpO2 (%)   96 -  99    95 -  99    95 -  98     98        Lab Results (last 72 hours)     Procedure Component Value Units Date/Time    Comprehensive Metabolic Panel [[id number]]  (Abnormal) Collected:  10/09/17 1220    Specimen:  Blood Updated:  10/09/17 1301     Glucose 109 mg/dL      BUN 40 (H) mg/dL      Creatinine 2.08 (H) mg/dL      Sodium 137 mmol/L      Potassium 3.8 mmol/L      Chloride 102 mmol/L      CO2 24.0 (L) mmol/L      Calcium 9.1 mg/dL      Total Protein 6.2 g/dL      Albumin 3.70 g/dL      ALT (SGPT) 19 U/L      AST (SGOT) 22 U/L      Alkaline Phosphatase 44 U/L       Note New Reference Ranges        Total Bilirubin 0.7 mg/dL      eGFR Non African Amer 23 (L) mL/min/1.73      Globulin 2.5 gm/dL      A/G Ratio 1.5 g/dL      BUN/Creatinine Ratio 19.2     Anion Gap 11.0 mmol/L     Narrative:       The MDRD GFR formula is only valid for adults with stable renal function between ages 18 and 70.    Osmolality, Calculated [[id number]]  (Normal) Collected:  10/09/17 1220    Specimen:  Blood Updated:  10/09/17 1301     Osmolality Calc 284.2 mOsm/kg     BNP [[id number]]  (Abnormal) Collected:  10/09/17 1220    Specimen:  Blood Updated:  10/09/17 1308     BNP 1885.0 (H) pg/mL     Troponin [[id number]]  (Normal) Collected:  10/09/17 1801    Specimen:  Blood Updated:  10/1934     Troponin I 0.039 ng/mL      Narrative:       Ultra Troponin I Reference Range:         <=0.039 ng/mL: Negative    0.04-0.779 ng/mL: Indeterminate Range. Suspicious of MI.  Clinical correlation required.       >=0.78  ng/mL: Consistent with myocardial injury.  Clinical correlation required.    Basic Metabolic Panel [[id number]]  (Abnormal) Collected:  10/11/17 0044    Specimen:  Blood Updated:  10/11/17 0156     Glucose 110 mg/dL      BUN 43 (H) mg/dL      Creatinine 2.06 (H) mg/dL      Sodium 141 mmol/L      Potassium 3.8 mmol/L      Chloride 106 mmol/L      CO2 27.7 mmol/L      Calcium 9.1 mg/dL      eGFR Non African Amer 23 (L) mL/min/1.73      BUN/Creatinine Ratio 20.9     Anion Gap 7.3 mmol/L     Narrative:       The MDRD GFR formula is only valid for adults with stable renal function between ages 18 and 70.    Osmolality, Calculated [[id number]]  (Normal) Collected:  10/11/17 0044    Specimen:  Blood Updated:  10/11/17 0156     Osmolality Calc 292.7 mOsm/kg     Iron Profile [[id number]]  (Abnormal) Collected:  10/12/17 0137    Specimen:  Blood Updated:  10/12/17 0235     Iron 30 (L) mcg/dL      TIBC 355 mcg/dL      Iron Saturation 8 (L) %     Basic Metabolic Panel [[id number]]  (Abnormal) Collected:  10/12/17 0137    Specimen:  Blood Updated:  10/12/17 0235     Glucose 103 mg/dL      BUN 51 (H) mg/dL      Creatinine 2.00 (H) mg/dL      Sodium 141 mmol/L      Potassium 3.8 mmol/L      Chloride 104 mmol/L      CO2 29.7 mmol/L      Calcium 9.1 mg/dL      eGFR Non African Amer 24 (L) mL/min/1.73      BUN/Creatinine Ratio 25.5 (H)     Anion Gap 7.3 mmol/L     Narrative:       The MDRD GFR formula is only valid for adults with stable renal function between ages 18 and 70.    Osmolality, Calculated [[id number]]  (Normal) Collected:  10/12/17 0137    Specimen:  Blood Updated:  10/12/17 0235     Osmolality Calc 295.2 mOsm/kg     Ferritin [[id number]]  (Normal) Collected:  10/12/17 0137    Specimen:  Blood Updated:  10/12/17 0243     Ferritin 24.00 ng/mL          Imaging Results (last 24 hours)     Procedure Component Value Units Date/Time    XR Chest 1 View [[id number]] Collected:  10/11/17 0939     Updated:  10/11/17 1017    Narrative:       XR CHEST 1 VIEW-     CLINICAL INDICATION: Congestive heart failure.          COMPARISON: 08/03/2017      TECHNIQUE: Single frontal view of the chest.     FINDINGS:     Left effusion and bibasilar consolidation.  The cardiac silhouette is normal. The pulmonary vasculature is  unremarkable.  There is no evidence of an acute osseous abnormality.   There are no suspicious-appearing parenchymal soft tissue nodules.            Impression:       Left effusion and bibasilar consolidation. Fullness in the right  perihilar region.         This report was finalized on 10/11/2017 10:15 AM by Dr. Bob Lovell MD.           Assessment:Active Problems:    Acute CHF   Chronic kidney disease  Plan: Continue IV hydration ambulate and hopefully home tomorrow

## 2017-10-12 NOTE — PROGRESS NOTES
Nephrology  Note      Subjective         Objective     Vital Signs  Temp:  [97.9 °F (36.6 °C)-98.6 °F (37 °C)] 98.6 °F (37 °C)  Heart Rate:  [73-83] 73  Resp:  [20] 20  BP: (143-161)/(84-90) 154/85    I/O this shift:  In: 240 [P.O.:240]  Out: -   I/O last 3 completed shifts:  In: 1080 [P.O.:1080]  Out: 3800 [Urine:3800]    Physical Examination:    General Appearance : alert, cooperative and no distress  Head : normocephalic  Eyes :  no pallor   Throat : oral mucosa moist  Neck:  no JVD  Lungs : reduced breath sounds at bases  Heart : regular rhythm & normal rate, normal S1, S2, no murmur  Abdomen :   soft non-tender  Extremities :  1+ edema  Pulses :  palpable and equal bilaterally  Neurologic : orientated to person, place, time, grossly no focal deficitis    Laboratory Data :      WBC No results found for: WBC   HGB No results found for: HGB   HCT No results found for: HCT   Platlets No results found for: LABPLAT   MCV No results found for: MCV       Sodium Sodium   Date Value Ref Range Status   10/12/2017 141 135 - 153 mmol/L Final   10/11/2017 141 135 - 153 mmol/L Final   10/09/2017 137 135 - 153 mmol/L Final      Potassium Potassium   Date Value Ref Range Status   10/12/2017 3.8 3.5 - 5.3 mmol/L Final   10/11/2017 3.8 3.5 - 5.3 mmol/L Final   10/09/2017 3.8 3.5 - 5.3 mmol/L Final      Chloride Chloride   Date Value Ref Range Status   10/12/2017 104 99 - 112 mmol/L Final   10/11/2017 106 99 - 112 mmol/L Final   10/09/2017 102 99 - 112 mmol/L Final      CO2 CO2   Date Value Ref Range Status   10/12/2017 29.7 24.3 - 31.9 mmol/L Final   10/11/2017 27.7 24.3 - 31.9 mmol/L Final   10/09/2017 24.0 (L) 24.3 - 31.9 mmol/L Final      BUN BUN   Date Value Ref Range Status   10/12/2017 51 (H) 7 - 21 mg/dL Final   10/11/2017 43 (H) 7 - 21 mg/dL Final   10/09/2017 40 (H) 7 - 21 mg/dL Final      Creatinine Creatinine   Date Value Ref Range Status   10/12/2017 2.00 (H) 0.43 - 1.29 mg/dL Final   10/11/2017 2.06 (H) 0.43 - 1.29  mg/dL Final   10/09/2017 2.08 (H) 0.43 - 1.29 mg/dL Final      Calcium Calcium   Date Value Ref Range Status   10/12/2017 9.1 7.7 - 10.0 mg/dL Final   10/11/2017 9.1 7.7 - 10.0 mg/dL Final   10/09/2017 9.1 7.7 - 10.0 mg/dL Final      PO4 No results found for: CAPO4   Albumin Albumin   Date Value Ref Range Status   10/09/2017 3.70 3.40 - 4.80 g/dL Final      Magnesium No results found for: MG   Uric Acid No results found for: URICACID     Radiology results :     Imaging Results (last 72 hours)     Procedure Component Value Units Date/Time    XR Chest 1 View [[id number]] Collected:  10/11/17 0939     Updated:  10/11/17 1017    Narrative:       XR CHEST 1 VIEW-     CLINICAL INDICATION: Congestive heart failure.          COMPARISON: 08/03/2017      TECHNIQUE: Single frontal view of the chest.     FINDINGS:     Left effusion and bibasilar consolidation.  The cardiac silhouette is normal. The pulmonary vasculature is  unremarkable.  There is no evidence of an acute osseous abnormality.   There are no suspicious-appearing parenchymal soft tissue nodules.            Impression:       Left effusion and bibasilar consolidation. Fullness in the right  perihilar region.         This report was finalized on 10/11/2017 10:15 AM by Dr. Bob Lovell MD.               Medications:        ALPRAZolam 0.5 mg Oral Nightly   aspirin 81 mg Oral Daily   citalopram 20 mg Oral Daily   furosemide 40 mg Oral Daily   hydrALAZINE 100 mg Oral Q8H   isosorbide mononitrate 60 mg Oral Daily   metoprolol tartrate 50 mg Oral Q12H   pantoprazole 40 mg Oral Q AM   vitamin D3 5,000 Units Oral Daily          Assessment/Plan     Active Problems:    Acute CHF      1. CKD 3 : her baseline creatinine is 1.6-1.9 and it is stable at 2 today. 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 Avoid nephrotoxins    2. Systolic CHF exacerbation : better, I will change to  lasix 40 mg PO daily.     3. HTN : stable    4. Iron deficiency Anemia : start po iron    I discussed the  patients findings and my recommendations with patient and nursing staff    Sebas Ellis MD  10/12/17  11:28 AM

## 2017-10-12 NOTE — PLAN OF CARE
Problem: Patient Care Overview (Adult)  Goal: Plan of Care Review  Outcome: Ongoing (interventions implemented as appropriate)    Problem: Pain, Acute (Adult)  Goal: Identify Related Risk Factors and Signs and Symptoms  Outcome: Ongoing (interventions implemented as appropriate)  Goal: Acceptable Pain Control/Comfort Level  Outcome: Ongoing (interventions implemented as appropriate)    Problem: Tissue Perfusion, Ineffective Peripheral (Adult)  Goal: Identify Related Risk Factors and Signs and Symptoms  Outcome: Ongoing (interventions implemented as appropriate)  Goal: Adequate Tissue Perfusion  Outcome: Ongoing (interventions implemented as appropriate)    Problem: Fluid Volume Excess (Adult,Obstetrics,Pediatric)  Goal: Identify Related Risk Factors and Signs and Symptoms  Outcome: Ongoing (interventions implemented as appropriate)  Goal: Stable Weight  Outcome: Ongoing (interventions implemented as appropriate)  Goal: Balanced Intake/Output  Outcome: Ongoing (interventions implemented as appropriate)    Problem: Pressure Ulcer Risk (Diogo Q Scale) (Pediatric)  Goal: Identify Related Risk Factors and Signs and Symptoms  Outcome: Ongoing (interventions implemented as appropriate)  Goal: Skin Integrity  Outcome: Ongoing (interventions implemented as appropriate)    Problem: Fall Risk (Adult)  Goal: Identify Related Risk Factors and Signs and Symptoms  Outcome: Ongoing (interventions implemented as appropriate)  Goal: Absence of Falls  Outcome: Ongoing (interventions implemented as appropriate)

## 2017-10-13 ENCOUNTER — EPISODE CHANGES (OUTPATIENT)
Dept: CASE MANAGEMENT | Facility: OTHER | Age: 82
End: 2017-10-13

## 2017-10-13 VITALS
RESPIRATION RATE: 20 BRPM | HEART RATE: 82 BPM | WEIGHT: 189.8 LBS | SYSTOLIC BLOOD PRESSURE: 146 MMHG | BODY MASS INDEX: 29.79 KG/M2 | HEIGHT: 67 IN | TEMPERATURE: 97.5 F | DIASTOLIC BLOOD PRESSURE: 83 MMHG | OXYGEN SATURATION: 92 %

## 2017-10-13 LAB
ANION GAP SERPL CALCULATED.3IONS-SCNC: 6.7 MMOL/L (ref 3.6–11.2)
BUN BLD-MCNC: 44 MG/DL (ref 7–21)
BUN/CREAT SERPL: 22.8 (ref 7–25)
CALCIUM SPEC-SCNC: 8.9 MG/DL (ref 7.7–10)
CHLORIDE SERPL-SCNC: 105 MMOL/L (ref 99–112)
CO2 SERPL-SCNC: 30.3 MMOL/L (ref 24.3–31.9)
CREAT BLD-MCNC: 1.93 MG/DL (ref 0.43–1.29)
GFR SERPL CREATININE-BSD FRML MDRD: 25 ML/MIN/1.73
GLUCOSE BLD-MCNC: 98 MG/DL (ref 70–110)
OSMOLALITY SERPL CALC.SUM OF ELEC: 294.3 MOSM/KG (ref 273–305)
POTASSIUM BLD-SCNC: 3.5 MMOL/L (ref 3.5–5.3)
SODIUM BLD-SCNC: 142 MMOL/L (ref 135–153)

## 2017-10-13 PROCEDURE — 80048 BASIC METABOLIC PNL TOTAL CA: CPT | Performed by: INTERNAL MEDICINE

## 2017-10-13 PROCEDURE — 94799 UNLISTED PULMONARY SVC/PX: CPT

## 2017-10-13 RX ADMIN — CITALOPRAM HYDROBROMIDE 20 MG: 20 TABLET ORAL at 08:21

## 2017-10-13 RX ADMIN — Medication 150 MG: at 08:22

## 2017-10-13 RX ADMIN — METOPROLOL TARTRATE 50 MG: 50 TABLET, FILM COATED ORAL at 08:22

## 2017-10-13 RX ADMIN — ASPIRIN 81 MG: 81 TABLET ORAL at 08:21

## 2017-10-13 RX ADMIN — FUROSEMIDE 40 MG: 40 TABLET ORAL at 08:22

## 2017-10-13 RX ADMIN — PANTOPRAZOLE SODIUM 40 MG: 40 TABLET, DELAYED RELEASE ORAL at 05:17

## 2017-10-13 RX ADMIN — HYDRALAZINE HYDROCHLORIDE 100 MG: 50 TABLET ORAL at 05:16

## 2017-10-13 RX ADMIN — CHOLECALCIFEROL CAP 125 MCG (5000 UNIT) 5000 UNITS: 125 CAP at 08:22

## 2017-10-13 RX ADMIN — ISOSORBIDE MONONITRATE 60 MG: 60 TABLET, EXTENDED RELEASE ORAL at 08:21

## 2017-10-13 NOTE — PROGRESS NOTES
Discharge Planning Assessment  CODY Ellison     Patient Name: Tre Kinney  MRN: [id number]  Today's Date: 10/13/2017    Admit Date: 10/9/2017          Discharge Needs Assessment     None            Discharge Plan       10/13/17 0904    Final Note    Final Note Pt to be discharged home on this date.  No further intervention needed.        Discharge Placement     No information found        Expected Discharge Date and Time     Expected Discharge Date Expected Discharge Time    Oct 13, 2017               Demographic Summary     None            Functional Status     None            Psychosocial     None            Abuse/Neglect     None            Legal     None            Substance Abuse     None            Patient Forms     None          Mary Salgado

## 2017-10-13 NOTE — PROGRESS NOTES
Nephrology  Note      Subjective     Feeling better. No SOB    Objective     Vital Signs  Temp:  [97.5 °F (36.4 °C)-98.9 °F (37.2 °C)] 97.5 °F (36.4 °C)  Heart Rate:  [79-82] 82  Resp:  [16-20] 20  BP: (133-154)/(67-90) 146/83    I/O this shift:  In: -   Out: 900 [Urine:900]  I/O last 3 completed shifts:  In: 960 [P.O.:960]  Out: 3450 [Urine:3450]    Physical Examination:    General Appearance : alert, cooperative and no distress  Head : normocephalic  Eyes :  no pallor   Throat : oral mucosa moist  Neck:  no JVD  Lungs : reduced breath sounds at bases  Heart : regular rhythm & normal rate, normal S1, S2, no murmur  Abdomen :   soft non-tender  Extremities :  trace edema  Neurologic : grossly no focal deficitis    Laboratory Data :      WBC No results found for: WBC   HGB No results found for: HGB   HCT No results found for: HCT   Platlets No results found for: LABPLAT   MCV No results found for: MCV       Sodium Sodium   Date Value Ref Range Status   10/13/2017 142 135 - 153 mmol/L Final   10/12/2017 141 135 - 153 mmol/L Final   10/11/2017 141 135 - 153 mmol/L Final      Potassium Potassium   Date Value Ref Range Status   10/13/2017 3.5 3.5 - 5.3 mmol/L Final   10/12/2017 3.8 3.5 - 5.3 mmol/L Final   10/11/2017 3.8 3.5 - 5.3 mmol/L Final      Chloride Chloride   Date Value Ref Range Status   10/13/2017 105 99 - 112 mmol/L Final   10/12/2017 104 99 - 112 mmol/L Final   10/11/2017 106 99 - 112 mmol/L Final      CO2 CO2   Date Value Ref Range Status   10/13/2017 30.3 24.3 - 31.9 mmol/L Final   10/12/2017 29.7 24.3 - 31.9 mmol/L Final   10/11/2017 27.7 24.3 - 31.9 mmol/L Final      BUN BUN   Date Value Ref Range Status   10/13/2017 44 (H) 7 - 21 mg/dL Final   10/12/2017 51 (H) 7 - 21 mg/dL Final   10/11/2017 43 (H) 7 - 21 mg/dL Final      Creatinine Creatinine   Date Value Ref Range Status   10/13/2017 1.93 (H) 0.43 - 1.29 mg/dL Final   10/12/2017 2.00 (H) 0.43 - 1.29 mg/dL Final   10/11/2017 2.06 (H) 0.43 - 1.29 mg/dL  Final      Calcium Calcium   Date Value Ref Range Status   10/13/2017 8.9 7.7 - 10.0 mg/dL Final   10/12/2017 9.1 7.7 - 10.0 mg/dL Final   10/11/2017 9.1 7.7 - 10.0 mg/dL Final      PO4 No results found for: CAPO4   Albumin No results found for: ALBUMIN   Magnesium No results found for: MG   Uric Acid No results found for: URICACID     Radiology results :     Imaging Results (last 72 hours)     Procedure Component Value Units Date/Time    XR Chest 1 View [[id number]] Collected:  10/11/17 0939     Updated:  10/11/17 1017    Narrative:       XR CHEST 1 VIEW-     CLINICAL INDICATION: Congestive heart failure.          COMPARISON: 08/03/2017      TECHNIQUE: Single frontal view of the chest.     FINDINGS:     Left effusion and bibasilar consolidation.  The cardiac silhouette is normal. The pulmonary vasculature is  unremarkable.  There is no evidence of an acute osseous abnormality.   There are no suspicious-appearing parenchymal soft tissue nodules.            Impression:       Left effusion and bibasilar consolidation. Fullness in the right  perihilar region.         This report was finalized on 10/11/2017 10:15 AM by Dr. Bbo Lovell MD.               Medications:        ALPRAZolam 0.5 mg Oral Nightly   aspirin 81 mg Oral Daily   citalopram 20 mg Oral Daily   furosemide 40 mg Oral Daily   hydrALAZINE 100 mg Oral Q8H   iron polysaccharides 150 mg Oral Daily   isosorbide mononitrate 60 mg Oral Daily   metoprolol tartrate 50 mg Oral Q12H   pantoprazole 40 mg Oral Q AM   vitamin D3 5,000 Units Oral Daily          Assessment/Plan     Active Problems:    Acute CHF      1. CKD 3 : her baseline creatinine is 1.6-1.9 and it is stable . 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 Avoid nephrotoxins    2. Systolic CHF exacerbation : ct  lasix 40 mg PO daily    3. HTN : stable    4. Iron deficiency Anemia : on po iron    Fu in clinic in 6 weeks  I discussed the patients findings and my recommendations with patient and nursing  staff    Sebas Ellis MD  10/13/17  12:26 PM

## 2017-10-19 NOTE — H&P
Patient Identification:  Name:  Tre Kinney  Age:  84 y.o.  Sex:  female  :  1932  MRN:  [id number]   Visit Number:  [id number]  Primary Care Physician:  Myles Barone MD       Chief complaint: peripheral edema and short of air    History of presenting illness:  84 y.o. female presented with increasing short of air swelling in her legs and having orthopnea and PND.   Patient is admitted for acute congestive heart failure and volume overload and will continue to diurese and may need renal consult due to her chronic kidney disease  ---------------------------------------------------------------------------------------------------------------------   Review Of Systems:  See HPI  ---------------------------------------------------------------------------------------------------------------------   Past Medical History:   Diagnosis Date   • Arthritis    • CHF (congestive heart failure)    • Coronary artery disease    • H/O blood clots    • History of transfusion     no reaction    • Hypertension      Past Surgical History:   Procedure Laterality Date   • ABDOMINAL SURGERY     • APPENDECTOMY     • CARDIAC SURGERY     •  SECTION     • CORONARY ANGIOPLASTY WITH STENT PLACEMENT     • PACEMAKER IMPLANTATION      left side    • SKIN BIOPSY     • TUBAL ABDOMINAL LIGATION       Family History   Problem Relation Age of Onset   • Other Mother      cardiac disorder   • Hypertension Mother    • Heart disease Mother    • Thyroid disease Mother    • Other Father      cardiac disorder   • Heart disease Father    • Hypertension Father    • Other Sister      cardiac disorder   • Other Brother      cardiac disorder   • Other Other      cardiac disorder   • Other Other      cardiac disorder     Social History     Social History   • Marital status:      Spouse name: N/A   • Number of children: N/A   • Years of education: N/A     Social History Main Topics   • Smoking status: Never Smoker   • Smokeless  tobacco: None   • Alcohol use No   • Drug use: No   • Sexual activity: Defer     Other Topics Concern   • None     Social History Narrative     ---------------------------------------------------------------------------------------------------------------------   Allergies:  Latex  ---------------------------------------------------------------------------------------------------------------------   Prior to Admission Medications     Prescriptions Last Dose Informant Patient Reported? Taking?    ALPRAZolam (XANAX) 0.5 MG tablet 10/8/2017 Pharmacy Yes Yes    Take 0.5 mg by mouth Every Night.    aspirin 81 MG EC tablet 10/9/2017 Self No Yes    Take 1 tablet by mouth Daily.    Cholecalciferol (VITAMIN D3) 5000 UNITS capsule capsule 10/9/2017 Pharmacy No Yes    Take 1 capsule by mouth Daily.    citalopram (CELEXA) 20 MG tablet 10/9/2017 Pharmacy Yes Yes    Take 20 mg by mouth Daily.    furosemide (LASIX) 20 MG tablet 10/9/2017 Pharmacy Yes Yes    Take 20 mg by mouth 2 (Two) Times a Day.    hydrALAZINE (APRESOLINE) 100 MG tablet 10/9/2017 Pharmacy No Yes    Take 1 tablet by mouth Every 8 (Eight) Hours.    HYDROcodone-acetaminophen (NORCO) 7.5-325 MG per tablet 10/9/2017 Pharmacy Yes Yes    Take 1 tablet by mouth 2 (Two) Times a Day As Needed for Moderate Pain .    isosorbide mononitrate (IMDUR) 60 MG 24 hr tablet 10/9/2017 Pharmacy Yes Yes    Take 60 mg by mouth Daily.    metoprolol tartrate (LOPRESSOR) 50 MG tablet 10/9/2017 Pharmacy Yes Yes    Take 50 mg by mouth 2 (Two) Times a Day.    pantoprazole (PROTONIX) 40 MG EC tablet 10/9/2017 Pharmacy Yes Yes    Take 40 mg by mouth Daily.    warfarin (COUMADIN) 6 MG tablet 10/9/2017 Self Yes Yes    Take 6 mg by mouth Daily.        Hospital Scheduled Meds:      No current facility-administered medications for this encounter.   ---------------------------------------------------------------------------------------------------------------------   Vital Signs:     Last 3 weights     10/09/17  1149 10/10/17  0335 10/12/17  0257   Weight: 189 lb 9.6 oz (86 kg) 189 lb 12.8 oz (86.1 kg) (bed won't show weight)     Body mass index is 29.73 kg/(m^2).  ---------------------------------------------------------------------------------------------------------------------   Physical Exam:  Constitutional: patient alert oriented but short of breath when she talks   HENT:  nneck supple +1 JVD  Eyes: extraocular movements intact no jaundice  Neck:     Cardiovascular: S1 and S2 tachycardic at 90  Pulmonary/Chest:  Bilateral crackles  Abdominal:  Soft and rounded  Musculoskeletal:  Moves all extremities with +3 edema in the legs    Neurological:   Skin:   Psychiatric:      ---------------------------------------------------------------------------------------------------------------------      ---------------------------------------------------------------------------------------------------------------------             Results from last 7 days  Lab Units 10/13/17  0107   SODIUM mmol/L 142   POTASSIUM mmol/L 3.5   CHLORIDE mmol/L 105   CO2 mmol/L 30.3   BUN mg/dL 44*   CREATININE mg/dL 1.93*   EGFR IF NONAFRICN AM mL/min/1.73 25*   CALCIUM mg/dL 8.9   GLUCOSE mg/dL 98   Estimated Creatinine Clearance: 24.5 mL/min (by C-G formula based on Cr of 1.93).  No results found for: AMMONIA          No results found for: HGBA1C  No results found for: TSH, FREET4  No results found for: PREGTESTUR, PREGSERUM, HCG, HCGQUANT  Pain Management Panel     Pain Management Panel Latest Ref Rng & Units 6/10/2017 6/10/2017    CREATININE UR mg/dL 114.0 114.0    AMPHETAMINES SCREEN, URINE Negative - -    BARBITURATES SCREEN Negative - -    BENZODIAZEPINE SCREEN, URINE Negative - -    COCAINE SCREEN, URINE Negative - -    METHADONE SCREEN, URINE Negative - -                        ---------------------------------------------------------------------------------------------------------------------  Imaging Results (last 7 days)      Procedure Component Value Units Date/Time    XR Chest 1 View [[id number]] Collected:  10/11/17 0939     Updated:  10/11/17 1017    Narrative:       XR CHEST 1 VIEW-     CLINICAL INDICATION: Congestive heart failure.          COMPARISON: 08/03/2017      TECHNIQUE: Single frontal view of the chest.     FINDINGS:     Left effusion and bibasilar consolidation.  The cardiac silhouette is normal. The pulmonary vasculature is  unremarkable.  There is no evidence of an acute osseous abnormality.   There are no suspicious-appearing parenchymal soft tissue nodules.            Impression:       Left effusion and bibasilar consolidation. Fullness in the right  perihilar region.         This report was finalized on 10/11/2017 10:15 AM by Dr. Bob Lovell MD.             ---------------------------------------------------------------------------------------------------------------------  Assessment and Plan:  1. Acute congestive heart failure with a left effusion and bibasilar consolidation but with no signs of pneumonia we'll plan on diuresing  And continuing to monitor her kidney function.  2.   3.   4.   5.     Myles Barone MD  10/19/17  8:04 AM

## 2017-10-22 NOTE — DISCHARGE SUMMARY
Patient Identification:  Name:  Tre Kinney  Age:  84 y.o.  Sex:  female  :  1932  MRN:  [id number]  Visit Number:  [id number]    Date of Admission: 10/9/2017  Date of Discharge:  10/22/2017     PCP: Myles Barone MD    Discharge Diagnosis: Acute systolic congestive congestive heart failure exacerbation/kidney disease 3/R deficiency anemia      CONSULTS nephrology      PROCEDURES PERFORMED  none      HOSPITAL COURSE  Patient is a 84 y.o. female presented to [facility] complaining of increasing swelling her legs and shortness of breath.  She was admitted with acute systolic congestive heart failure with volume overload and she was gently diuresed.  Renal was consulted due to her creatinine function recommended Lasix 40 mg daily and no avoidance or and avoidance of nephrotoxins patient did well with diuresis and come discharge breathing much better was ambulating and was discharged home take Lasix 40 daily and will follow back up in 1 week with the patient and check electrolytes.  Please see the admitting history and physical for further details.      VITAL SIGNS:  Last 3 weights    10/09/17  1149 10/10/17  0335 10/12/17  0257   Weight: 189 lb 9.6 oz (86 kg) 189 lb 12.8 oz (86.1 kg) (bed won't show weight)     Body mass index is 29.73 kg/(m^2).        DISCHARGE DISPOSITION   Stable    DISCHARGE MEDICATIONS:   Tre Kinney   Home Medication Instructions ALVARO:[id number]    Printed on:10/22/17 0752   Medication Information                      ALPRAZolam (XANAX) 0.5 MG tablet  Take 0.5 mg by mouth Every Night.             aspirin 81 MG EC tablet  Take 1 tablet by mouth Daily.             Cholecalciferol (VITAMIN D3) 5000 UNITS capsule capsule  Take 1 capsule by mouth Daily.             citalopram (CELEXA) 20 MG tablet  Take 20 mg by mouth Daily.             furosemide (LASIX) 20 MG tablet  Take 20 mg by mouth 2 (Two) Times a Day.             hydrALAZINE (APRESOLINE) 100 MG tablet  Take 1  tablet by mouth Every 8 (Eight) Hours.             HYDROcodone-acetaminophen (NORCO) 7.5-325 MG per tablet  Take 1 tablet by mouth 2 (Two) Times a Day As Needed for Moderate Pain .             isosorbide mononitrate (IMDUR) 60 MG 24 hr tablet  Take 60 mg by mouth Daily.             metoprolol tartrate (LOPRESSOR) 50 MG tablet  Take 50 mg by mouth 2 (Two) Times a Day.             pantoprazole (PROTONIX) 40 MG EC tablet  Take 40 mg by mouth Daily.             warfarin (COUMADIN) 6 MG tablet  Take 6 mg by mouth Daily.                   Your Scheduled Appointments     Nov 28, 2017  2:40 PM EST   Follow Up with Nba Arora MD   [facility] CARDIOLOGY (--)    [address] [address] [phone]           Arrive 15 minutes prior to appointment.            Additional instructions:      Dr. Barone follow up  10/23/2017 3:20 PM                  Follow-up Information     Follow up with Myles Barone MD .    Specialty:  Family Medicine    Contact information:    [address] [facility] [address]  [phone]               Myles Barone MD  10/22/17  7:52 AM

## 2017-11-02 ENCOUNTER — TELEPHONE (OUTPATIENT)
Dept: CARDIOLOGY | Facility: CLINIC | Age: 82
End: 2017-11-02

## 2017-11-02 NOTE — TELEPHONE ENCOUNTER
Called patient to get her overdue lab performed prior to appt on Nov 28th.  TSH.  She voiced agreement and will have it done.

## 2017-11-07 ENCOUNTER — EPISODE CHANGES (OUTPATIENT)
Dept: CASE MANAGEMENT | Facility: OTHER | Age: 82
End: 2017-11-07

## 2017-11-08 ENCOUNTER — HOSPITAL ENCOUNTER (INPATIENT)
Facility: HOSPITAL | Age: 82
LOS: 7 days | Discharge: HOME | End: 2017-11-17
Attending: FAMILY MEDICINE | Admitting: FAMILY MEDICINE

## 2017-11-08 ENCOUNTER — APPOINTMENT (OUTPATIENT)
Dept: GENERAL RADIOLOGY | Facility: HOSPITAL | Age: 82
End: 2017-11-08

## 2017-11-08 LAB
A-A DO2: 52.8 MMHG (ref 0–300)
ANION GAP SERPL CALCULATED.3IONS-SCNC: 6.7 MMOL/L (ref 3.6–11.2)
ARTERIAL PATENCY WRIST A: POSITIVE
ATMOSPHERIC PRESS: 729 MMHG
BASE EXCESS BLDA CALC-SCNC: -1.5 MMOL/L
BASOPHILS # BLD AUTO: 0.04 10*3/MM3 (ref 0–0.3)
BASOPHILS NFR BLD AUTO: 0.6 % (ref 0–2)
BDY SITE: ABNORMAL
BNP SERPL-MCNC: 1516 PG/ML (ref 0–100)
BODY TEMPERATURE: 98.6 C
BUN BLD-MCNC: 47 MG/DL (ref 7–21)
BUN/CREAT SERPL: 20.6 (ref 7–25)
CALCIUM SPEC-SCNC: 9.1 MG/DL (ref 7.7–10)
CHLORIDE SERPL-SCNC: 103 MMOL/L (ref 99–112)
CO2 SERPL-SCNC: 27.3 MMOL/L (ref 24.3–31.9)
COHGB MFR BLD: 0.8 % (ref 0–5)
CREAT BLD-MCNC: 2.28 MG/DL (ref 0.43–1.29)
DEPRECATED RDW RBC AUTO: 49.2 FL (ref 37–54)
EOSINOPHIL # BLD AUTO: 0.06 10*3/MM3 (ref 0–0.7)
EOSINOPHIL NFR BLD AUTO: 0.9 % (ref 0–7)
ERYTHROCYTE [DISTWIDTH] IN BLOOD BY AUTOMATED COUNT: 15.6 % (ref 11.5–14.5)
GFR SERPL CREATININE-BSD FRML MDRD: 20 ML/MIN/1.73
GLUCOSE BLD-MCNC: 105 MG/DL (ref 70–110)
HCO3 BLDA-SCNC: 22.8 MMOL/L (ref 22–26)
HCT VFR BLD AUTO: 34 % (ref 37–47)
HCT VFR BLD CALC: 33 % (ref 37–47)
HGB BLD-MCNC: 11.1 G/DL (ref 12–16)
HGB BLDA-MCNC: 11.2 G/DL (ref 12–16)
HOROWITZ INDEX BLD+IHG-RTO: 28 %
IMM GRANULOCYTES # BLD: 0.01 10*3/MM3 (ref 0–0.03)
IMM GRANULOCYTES NFR BLD: 0.1 % (ref 0–0.5)
INR PPP: 2.41 (ref 0.9–1.1)
LYMPHOCYTES # BLD AUTO: 0.58 10*3/MM3 (ref 1–3)
LYMPHOCYTES NFR BLD AUTO: 8.3 % (ref 16–46)
MCH RBC QN AUTO: 28.2 PG (ref 27–33)
MCHC RBC AUTO-ENTMCNC: 32.6 G/DL (ref 33–37)
MCV RBC AUTO: 86.5 FL (ref 80–94)
METHGB BLD QL: 0.1 % (ref 0–3)
MODALITY: ABNORMAL
MONOCYTES # BLD AUTO: 0.42 10*3/MM3 (ref 0.1–0.9)
MONOCYTES NFR BLD AUTO: 6 % (ref 0–12)
NEUTROPHILS # BLD AUTO: 5.87 10*3/MM3 (ref 1.4–6.5)
NEUTROPHILS NFR BLD AUTO: 84.1 % (ref 40–75)
OSMOLALITY SERPL CALC.SUM OF ELEC: 286.4 MOSM/KG (ref 273–305)
OXYHGB MFR BLDV: 96.2 % (ref 85–100)
PCO2 BLDA: 37 MM HG (ref 35–45)
PH BLDA: 7.41 PH UNITS (ref 7.35–7.45)
PLATELET # BLD AUTO: 189 10*3/MM3 (ref 130–400)
PMV BLD AUTO: 9.8 FL (ref 6–10)
PO2 BLDA: 94.5 MM HG (ref 80–100)
POTASSIUM BLD-SCNC: 4.2 MMOL/L (ref 3.5–5.3)
PROTHROMBIN TIME: 26.6 SECONDS (ref 11–15.4)
RBC # BLD AUTO: 3.93 10*6/MM3 (ref 4.2–5.4)
SAO2 % BLDCOA: 97.1 % (ref 90–100)
SODIUM BLD-SCNC: 137 MMOL/L (ref 135–153)
WBC NRBC COR # BLD: 6.98 10*3/MM3 (ref 4.5–12.5)

## 2017-11-08 PROCEDURE — 83880 ASSAY OF NATRIURETIC PEPTIDE: CPT | Performed by: FAMILY MEDICINE

## 2017-11-08 PROCEDURE — 36600 WITHDRAWAL OF ARTERIAL BLOOD: CPT | Performed by: FAMILY MEDICINE

## 2017-11-08 PROCEDURE — 85025 COMPLETE CBC W/AUTO DIFF WBC: CPT | Performed by: FAMILY MEDICINE

## 2017-11-08 PROCEDURE — 93005 ELECTROCARDIOGRAM TRACING: CPT | Performed by: FAMILY MEDICINE

## 2017-11-08 PROCEDURE — 85610 PROTHROMBIN TIME: CPT | Performed by: FAMILY MEDICINE

## 2017-11-08 PROCEDURE — 83050 HGB METHEMOGLOBIN QUAN: CPT | Performed by: FAMILY MEDICINE

## 2017-11-08 PROCEDURE — 25010000002 FUROSEMIDE PER 20 MG: Performed by: FAMILY MEDICINE

## 2017-11-08 PROCEDURE — 82375 ASSAY CARBOXYHB QUANT: CPT | Performed by: FAMILY MEDICINE

## 2017-11-08 PROCEDURE — 71010 XR CHEST AP: CPT | Performed by: RADIOLOGY

## 2017-11-08 PROCEDURE — G0378 HOSPITAL OBSERVATION PER HR: HCPCS

## 2017-11-08 PROCEDURE — 82805 BLOOD GASES W/O2 SATURATION: CPT | Performed by: FAMILY MEDICINE

## 2017-11-08 PROCEDURE — 80048 BASIC METABOLIC PNL TOTAL CA: CPT | Performed by: FAMILY MEDICINE

## 2017-11-08 PROCEDURE — 71010 HC CHEST AP: CPT

## 2017-11-08 RX ORDER — WARFARIN SODIUM 4 MG/1
6 TABLET ORAL
COMMUNITY
End: 2018-03-08 | Stop reason: HOSPADM

## 2017-11-08 RX ORDER — GABAPENTIN 400 MG/1
400 CAPSULE ORAL NIGHTLY
COMMUNITY

## 2017-11-08 RX ORDER — ASPIRIN 81 MG/1
81 TABLET ORAL DAILY
Status: DISCONTINUED | OUTPATIENT
Start: 2017-11-08 | End: 2017-11-17 | Stop reason: HOSPADM

## 2017-11-08 RX ORDER — ISOSORBIDE MONONITRATE 60 MG/1
60 TABLET, EXTENDED RELEASE ORAL
Status: DISCONTINUED | OUTPATIENT
Start: 2017-11-08 | End: 2017-11-17 | Stop reason: HOSPADM

## 2017-11-08 RX ORDER — FUROSEMIDE 20 MG/1
20 TABLET ORAL EVERY 12 HOURS SCHEDULED
Status: DISCONTINUED | OUTPATIENT
Start: 2017-11-09 | End: 2017-11-09

## 2017-11-08 RX ORDER — HYDROCODONE BITARTRATE AND ACETAMINOPHEN 7.5; 325 MG/1; MG/1
1 TABLET ORAL 2 TIMES DAILY PRN
Status: DISCONTINUED | OUTPATIENT
Start: 2017-11-08 | End: 2017-11-17 | Stop reason: HOSPADM

## 2017-11-08 RX ORDER — METOPROLOL SUCCINATE 50 MG/1
50 TABLET, EXTENDED RELEASE ORAL
Status: DISCONTINUED | OUTPATIENT
Start: 2017-11-08 | End: 2017-11-09

## 2017-11-08 RX ORDER — CITALOPRAM 20 MG/1
20 TABLET ORAL DAILY
Status: DISCONTINUED | OUTPATIENT
Start: 2017-11-09 | End: 2017-11-17 | Stop reason: HOSPADM

## 2017-11-08 RX ORDER — FUROSEMIDE 10 MG/ML
40 INJECTION INTRAMUSCULAR; INTRAVENOUS ONCE
Status: COMPLETED | OUTPATIENT
Start: 2017-11-08 | End: 2017-11-08

## 2017-11-08 RX ORDER — HYDRALAZINE HYDROCHLORIDE 50 MG/1
100 TABLET, FILM COATED ORAL EVERY 8 HOURS SCHEDULED
Status: DISCONTINUED | OUTPATIENT
Start: 2017-11-08 | End: 2017-11-12

## 2017-11-08 RX ORDER — ISOSORBIDE MONONITRATE 60 MG/1
60 TABLET, EXTENDED RELEASE ORAL DAILY
Status: CANCELLED | OUTPATIENT
Start: 2017-11-09

## 2017-11-08 RX ORDER — HYDRALAZINE HYDROCHLORIDE 50 MG/1
100 TABLET, FILM COATED ORAL EVERY 8 HOURS SCHEDULED
Status: CANCELLED | OUTPATIENT
Start: 2017-11-08

## 2017-11-08 RX ORDER — PANTOPRAZOLE SODIUM 40 MG/1
40 TABLET, DELAYED RELEASE ORAL EVERY MORNING
Status: DISCONTINUED | OUTPATIENT
Start: 2017-11-09 | End: 2017-11-17 | Stop reason: HOSPADM

## 2017-11-08 RX ORDER — WARFARIN SODIUM 3 MG/1
6 TABLET ORAL
Status: CANCELLED | OUTPATIENT
Start: 2017-11-09

## 2017-11-08 RX ORDER — GABAPENTIN 400 MG/1
400 CAPSULE ORAL NIGHTLY
Status: DISCONTINUED | OUTPATIENT
Start: 2017-11-08 | End: 2017-11-17 | Stop reason: HOSPADM

## 2017-11-08 RX ORDER — WARFARIN SODIUM 3 MG/1
6 TABLET ORAL
Status: DISCONTINUED | OUTPATIENT
Start: 2017-11-09 | End: 2017-11-14

## 2017-11-08 RX ORDER — METOPROLOL TARTRATE 50 MG/1
50 TABLET, FILM COATED ORAL EVERY 12 HOURS SCHEDULED
Status: CANCELLED | OUTPATIENT
Start: 2017-11-08

## 2017-11-08 RX ORDER — ASPIRIN 81 MG/1
81 TABLET ORAL DAILY
Status: CANCELLED | OUTPATIENT
Start: 2017-11-09

## 2017-11-08 RX ADMIN — FUROSEMIDE 40 MG: 10 INJECTION, SOLUTION INTRAMUSCULAR; INTRAVENOUS at 16:20

## 2017-11-08 RX ADMIN — HYDRALAZINE HYDROCHLORIDE 100 MG: 50 TABLET, FILM COATED ORAL at 20:32

## 2017-11-08 RX ADMIN — GABAPENTIN 400 MG: 400 CAPSULE ORAL at 20:32

## 2017-11-08 RX ADMIN — ISOSORBIDE MONONITRATE 60 MG: 60 TABLET, EXTENDED RELEASE ORAL at 18:16

## 2017-11-08 RX ADMIN — METOPROLOL SUCCINATE 50 MG: 50 TABLET, FILM COATED, EXTENDED RELEASE ORAL at 18:16

## 2017-11-08 RX ADMIN — ASPIRIN 81 MG: 81 TABLET ORAL at 18:16

## 2017-11-08 NOTE — CONSULTS
Referring Provider: -Dr. Barone    Primary care provider-Dr. Barone    Primary cardiologist-Dr. Rodríguez    Reason for Consultation: -Acute on chronic systolic heart failure    Chief complaint     Shortness of breath and progressive leg edema      History of present illness:      84-year-old woman with history of arteriosclerotic CAD status post stent done about 3 years ago-details not known and ischemic cardiomyopathy with ejection fraction 35-40% per echocardiogram done 7/2017 and repeated hospitalization for CHF has been admitted with chief complaint of increasing shortness of breath upon exertion which has been progressive and now gets this symptom on minimal exertion associated with progressive bilateral leg edema.  She is taking furosemide 40 mg by mouth daily at home with no improvement in her symptoms.  Associated with generalized weakness and easy fatigability.  Her BNP level was more than 1500 and chest x-ray showed left-sided pleural effusion and possible bypass graft consolidation.  Patient has chronic kidney disease with creatinine of 2.2.  Patient was given furosemide 40 mg IV ×1.    She denied history of chest pain or angina.  No history of dizziness, palpitation or syncope.    Her cardiovascular history is remarkable for arteriosclerotic CAD status post stent in 2014-details not known, chronic systolic CHF, ischemic cardiomyopathy with EF 35-40%, atrial fibrillation, long-term anticoagulation with Coumadin, sick sinus syndrome and status post pacemaker implantation.    Cardiac risk factors-hypertension, hyperlipidemia and family history of heart disease.  No history of smoking.    She has chronic kidney disease with creatinine of 2.2.      Review of Systems     Constitutional-generalized weakness and easy fatigability  ENT-none [place] cardiovascular-as above  Respiratory-shortness of breath  GI-stomach problem  Endocrine-lipid disorder  Musculoskeletal-arthritis pain  Genitourinary-renal failure  Review  "of father organ system involvement-negative    History  Past Medical History:   Diagnosis Date   • Arthritis    • CHF (congestive heart failure)    • Coronary artery disease    • H/O blood clots    • History of transfusion     no reaction    • Hypertension    , Past Surgical History:   Procedure Laterality Date   • ABDOMINAL SURGERY     • APPENDECTOMY     • CARDIAC SURGERY     •  SECTION     • CORONARY ANGIOPLASTY WITH STENT PLACEMENT     • PACEMAKER IMPLANTATION      left side    • SKIN BIOPSY     • TUBAL ABDOMINAL LIGATION     , Family History   Problem Relation Age of Onset   • Other Mother      cardiac disorder   • Hypertension Mother    • Heart disease Mother    • Thyroid disease Mother    • Other Father      cardiac disorder   • Heart disease Father    • Hypertension Father    • Other Sister      cardiac disorder   • Other Brother      cardiac disorder   • Other Other      cardiac disorder   • Other Other      cardiac disorder   , Social History   Substance Use Topics   • Smoking status: Never Smoker   • Smokeless tobacco: Not on file   • Alcohol use No   ,     Medication Review:      aspirin 81 mg Oral Daily   hydrALAZINE 100 mg Oral Q8H   isosorbide mononitrate 60 mg Oral Q24H   metoprolol succinate XL 50 mg Oral Q24H        Allergies:  Latex    Objective     Vital Sign Min/Max for last 24 hours  Temp  Min: 97.9 °F (36.6 °C)  Max: 98 °F (36.7 °C)   BP  Min: 133/86  Max: 144/77   Pulse  Min: 79  Max: 79   Resp  Min: 18  Max: 18   SpO2  Min: 95 %  Max: 98 %   No Data Recorded   Weight  Min: 216 lb 6.4 oz (98.2 kg)  Max: 216 lb 6.4 oz (98.2 kg)     Flowsheet Rows         First Filed Value    Admission Height  67\" (170.2 cm) Documented at 2017 1308    Admission Weight  216 lb 6.4 oz (98.2 kg) Documented at 2017 1308             Physical Exam:     General Appearance:    Alert, cooperative, in no acute distress   Head:    Normocephalic, without obvious abnormality, atraumatic   Eyes:            " Lids and lashes normal, conjunctivae and sclerae normal, no   icterus, no pallor, corneas clear, PERRLA   Ears:    Ears appear intact with no abnormalities noted   Throat:   No oral lesions, no thrush, oral mucosa moist   Neck:   No adenopathy, supple, trachea midline, no thyromegaly, no   carotid bruit, no JVD   Back:     No kyphosis present, no scoliosis present, no skin lesions,      erythema or scars, no tenderness to percussion or                   palpation,   range of motion normal   Lungs:     Diminished air entry in left lower chest.  No rales were heard.      Heart:    Regular rhythm and normal rate, normal S1 and S2, no            murmur, no gallop, no rub, no click   Chest Wall:    No abnormalities observed   Abdomen:     Normal bowel sounds, no masses, no organomegaly, soft        non-tender, non-distended, no guarding, no rebound                tenderness   Rectal:     Deferred   Extremities:   3+ bilateral pitting pedal edema    Pulses:   Pulses palpable and equal bilaterally   Skin:   No bleeding, bruising or rash   Lymph nodes:   No palpable adenopathy   Neurologic:   Cranial nerves 2 - 12 grossly intact, sensation intact, DTR       present and equal bilaterally       Telemetry    Ventricular paced rhythm    ECG  ECG/EMG Results (last 24 hours)     Procedure Component Value Units Date/Time    ECG 12 Lead [[id number]] Collected:  11/08/17 1558     Updated:  11/08/17 1605    Narrative:       Test Reason : CHF  Blood Pressure : **/** mmHG  Vent. Rate : 080 BPM     Atrial Rate : 078 BPM     P-R Int : 000 ms          QRS Dur : 206 ms      QT Int : 476 ms       P-R-T Axes : 000 -67 103 degrees     QTc Int : 548 ms    Ventricular-paced rhythm  Abnormal ECG  When compared with ECG of 11-OCT-2017 04:10,  No significant change was found    Referred By:  JUAN M           Confirmed By:         Echocardiogram done 7/2017-    · The study is technically adequate for diagnosis.  · Estimated EF appears to be in the  range of 36 - 40%.  · Right ventricular cavity is mildly dilated.  · Left atrial cavity size is moderate-to-severely dilated.  · Right atrial cavity size is severely dilated.  · Moderate mitral valve regurgitation is present  · Moderate to severe tricuspid valve regurgitation is present.  · Mild pulmonic valve regurgitation is present.  · Electronic lead present in the right ventricle.  · There is no evidence of pericardial effusion.  · Moderate pulmonary hypertension is present.      Compared to the study of 12/25/2016, The EF is now mildly to moderately reduced. The atria are now dilated. There is moderate mitral and moderate to severe tricuspid regurgitation. There is moderate pulmonary hypertension.    Labs    Results from last 7 days  Lab Units 11/08/17  1529   WBC 10*3/mm3 6.98   HEMOGLOBIN g/dL 11.1*   HEMATOCRIT % 34.0*   PLATELETS 10*3/mm3 189       Results from last 7 days  Lab Units 11/08/17  1529   SODIUM mmol/L 137   POTASSIUM mmol/L 4.2   CHLORIDE mmol/L 103   CO2 mmol/L 27.3   BUN mg/dL 47*   CREATININE mg/dL 2.28*   CALCIUM mg/dL 9.1   GLUCOSE mg/dL 105               Results from last 7 days  Lab Units 11/08/17  1526   INR  2.41*               Results from last 7 days  Lab Units 11/08/17  1649   PH, ARTERIAL pH units 7.408   PO2 ART mm Hg 94.5   PCO2, ARTERIAL mm Hg 37.0   HCO3 ART mmol/L 22.8         Imaging  Imaging Results (last 24 hours)     ** No results found for the last 24 hours. **            Assessment     1.  Acute on chronic systolic heart failure.  New York Heart Association class III.  EF-35-40%  2.  Ischemic cardiomyopathy  3.  Arteriosclerotic CAD status post [id number] not known  4.  Atrial fibrillation  5.  Anticoagulated on warfarin  6.  Sick sinus syndrome status post permanent pacemaker implantation  7.  Chronic kidney disease  8.  Hypertension-well controlled    Plan    1.  Patient received furosemide 40 mg ×1 dose.  Will monitor intake output and renal function   2.   Resume metoprolol ER and hydralazine plus nitrate combination.  Not a candidate for ACE inhibitor Entresto  Or spironolactone because of renal failure  3.  Resume aspirin 81 mg daily and patient is intolerant to all kinds of statin  4.  Continue warfarin with a target INR of 2-3  5.  Dr. Rodríguez, patient's primary cardiologist will resume cardiac care in the a.m.    I discsed the patients findings and my recommendations with patient     Thanks Dr. Barone for the consult    Ngozi De La Cruz MD  11/08/17  5:50 PM

## 2017-11-09 ENCOUNTER — EPISODE CHANGES (OUTPATIENT)
Dept: CASE MANAGEMENT | Facility: OTHER | Age: 82
End: 2017-11-09

## 2017-11-09 PROBLEM — I50.9 CHF (CONGESTIVE HEART FAILURE) (HCC): Status: ACTIVE | Noted: 2017-11-09

## 2017-11-09 LAB
INR PPP: 2.51 (ref 0.9–1.1)
PROTHROMBIN TIME: 27.4 SECONDS (ref 11–15.4)

## 2017-11-09 PROCEDURE — 99232 SBSQ HOSP IP/OBS MODERATE 35: CPT | Performed by: INTERNAL MEDICINE

## 2017-11-09 PROCEDURE — G0378 HOSPITAL OBSERVATION PER HR: HCPCS

## 2017-11-09 PROCEDURE — 85610 PROTHROMBIN TIME: CPT | Performed by: FAMILY MEDICINE

## 2017-11-09 PROCEDURE — 25010000002 FUROSEMIDE PER 20 MG: Performed by: INTERNAL MEDICINE

## 2017-11-09 RX ORDER — FUROSEMIDE 10 MG/ML
40 INJECTION INTRAMUSCULAR; INTRAVENOUS DAILY
Status: DISCONTINUED | OUTPATIENT
Start: 2017-11-09 | End: 2017-11-12

## 2017-11-09 RX ORDER — CARVEDILOL 6.25 MG/1
12.5 TABLET ORAL EVERY 12 HOURS SCHEDULED
Status: DISCONTINUED | OUTPATIENT
Start: 2017-11-09 | End: 2017-11-17 | Stop reason: HOSPADM

## 2017-11-09 RX ADMIN — GABAPENTIN 400 MG: 400 CAPSULE ORAL at 21:36

## 2017-11-09 RX ADMIN — CITALOPRAM HYDROBROMIDE 20 MG: 20 TABLET ORAL at 08:52

## 2017-11-09 RX ADMIN — ISOSORBIDE MONONITRATE 60 MG: 60 TABLET, EXTENDED RELEASE ORAL at 08:52

## 2017-11-09 RX ADMIN — HYDRALAZINE HYDROCHLORIDE 100 MG: 50 TABLET, FILM COATED ORAL at 14:14

## 2017-11-09 RX ADMIN — METOPROLOL SUCCINATE 50 MG: 50 TABLET, FILM COATED, EXTENDED RELEASE ORAL at 08:52

## 2017-11-09 RX ADMIN — HYDROCODONE BITARTRATE AND ACETAMINOPHEN 1 TABLET: 7.5; 325 TABLET ORAL at 17:52

## 2017-11-09 RX ADMIN — FUROSEMIDE 40 MG: 10 INJECTION, SOLUTION INTRAMUSCULAR; INTRAVENOUS at 14:14

## 2017-11-09 RX ADMIN — HYDRALAZINE HYDROCHLORIDE 100 MG: 50 TABLET, FILM COATED ORAL at 22:50

## 2017-11-09 RX ADMIN — CARVEDILOL 12.5 MG: 6.25 TABLET, FILM COATED ORAL at 21:36

## 2017-11-09 RX ADMIN — WARFARIN SODIUM 6 MG: 3 TABLET ORAL at 17:47

## 2017-11-09 RX ADMIN — CHOLECALCIFEROL CAP 125 MCG (5000 UNIT) 5000 UNITS: 125 CAP at 08:52

## 2017-11-09 RX ADMIN — FUROSEMIDE 20 MG: 20 TABLET ORAL at 08:53

## 2017-11-09 RX ADMIN — HYDRALAZINE HYDROCHLORIDE 100 MG: 50 TABLET, FILM COATED ORAL at 05:28

## 2017-11-09 RX ADMIN — PANTOPRAZOLE SODIUM 40 MG: 40 TABLET, DELAYED RELEASE ORAL at 05:28

## 2017-11-09 RX ADMIN — ASPIRIN 81 MG: 81 TABLET ORAL at 08:52

## 2017-11-09 RX ADMIN — CARVEDILOL 12.5 MG: 6.25 TABLET, FILM COATED ORAL at 14:14

## 2017-11-09 NOTE — PLAN OF CARE
Problem: Patient Care Overview (Adult)  Goal: Plan of Care Review  Outcome: Ongoing (interventions implemented as appropriate)  Goal: Adult Individualization and Mutuality  Outcome: Ongoing (interventions implemented as appropriate)  Goal: Discharge Needs Assessment  Outcome: Ongoing (interventions implemented as appropriate)    Problem: Fluid Volume Excess (Adult,Obstetrics,Pediatric)  Goal: Identify Related Risk Factors and Signs and Symptoms  Outcome: Ongoing (interventions implemented as appropriate)  Goal: Stable Weight  Outcome: Ongoing (interventions implemented as appropriate)  Goal: Balanced Intake/Output  Outcome: Ongoing (interventions implemented as appropriate)    Problem: Pain, Acute (Adult)  Goal: Identify Related Risk Factors and Signs and Symptoms  Outcome: Ongoing (interventions implemented as appropriate)  Goal: Acceptable Pain Control/Comfort Level  Outcome: Ongoing (interventions implemented as appropriate)    Problem: Pressure Ulcer Risk (Diogo Scale) (Adult,Obstetrics,Pediatric)  Goal: Identify Related Risk Factors and Signs and Symptoms  Outcome: Ongoing (interventions implemented as appropriate)  Goal: Skin Integrity  Outcome: Ongoing (interventions implemented as appropriate)

## 2017-11-09 NOTE — PROGRESS NOTES
LOS: 0 days   Patient Care Team:  Myles Barone MD as PCP - General  Myles Barone MD as PCP - Family Medicine  Myles Barone MD as PCP - Claims Attributed  Kelli Márquez RN as Care Coordinator (Population Health)      Subjective     Admission information:    84-year-old woman with history of arteriosclerotic CAD status post stent done about 3 years ago-details not known and ischemic cardiomyopathy with ejection fraction 35-40% per echocardiogram done 7/2017 and repeated hospitalization for CHF has been admitted with chief complaint of increasing shortness of breath upon exertion which has been progressive and now gets this symptom on minimal exertion associated with progressive bilateral leg edema.  She is taking furosemide 40 mg by mouth daily at home with no improvement in her symptoms.  Associated with generalized weakness and easy fatigability.  Her BNP level was more than 1500 and chest x-ray showed left-sided pleural effusion and possible bypass graft consolidation.  Patient has chronic kidney disease with creatinine of 2.2.  Patient was given furosemide 40 mg IV ×1.     She denied history of chest pain or angina.  No history of dizziness, palpitation or syncope.     Her cardiovascular history is remarkable for arteriosclerotic CAD status post stent in 2014-details not known, chronic systolic CHF, ischemic cardiomyopathy with EF 35-40%, atrial fibrillation, long-term anticoagulation with Coumadin, sick sinus syndrome and status post pacemaker implantation.     Cardiac risk factors-hypertension, hyperlipidemia and family history of heart disease.  No history of smoking.     She has chronic kidney disease with creatinine of 2.2.    Interval History:     According to the patient she is doing better today.  Her breathing is improved but she continues to present with significant lower extremity edema.  She denies any chest pain or palpitations.    History taken from: patient chart    Vital  Signs  Temp:  [97.5 °F (36.4 °C)-98 °F (36.7 °C)] 97.8 °F (36.6 °C)  Heart Rate:  [78-83] 79  Resp:  [18-22] 18  BP: (133-148)/(70-89) 145/86    Physical Exam:     Physical Exam   Constitutional: She is oriented to person, place, and time. She appears well-developed and well-nourished.   Elderly and obese white female sitting comfortably on bed.   HENT:   Mouth/Throat: Oropharynx is clear and moist.   Eyes: EOM are normal. Pupils are equal, round, and reactive to light.   Neck: Neck supple. No JVD present. No tracheal deviation present. No thyromegaly present.   Cardiovascular: Normal rate, regular rhythm, S1 normal and S2 normal.  Exam reveals no gallop and no friction rub.    No murmur heard.  Pulmonary/Chest: Effort normal. No respiratory distress. She has no wheezes. She has rales.   Abdominal: Soft. Bowel sounds are normal. She exhibits no mass. There is no tenderness.   Musculoskeletal: Normal range of motion. She exhibits edema.   Lymphadenopathy:     She has no cervical adenopathy.   Neurological: She is alert and oriented to person, place, and time.   Skin: Skin is warm and dry. No rash noted.   Psychiatric: She has a normal mood and affect.       Results Review:    I reviewed the patient's new clinical results.  I reviewed the patient's new imaging results and agree with the interpretation.  I reviewed the patient's other test results and agree with the interpretation  I personally viewed and interpreted the patient's EKG/Telemetry data    Medication:  Scheduled Meds:  aspirin 81 mg Oral Daily   citalopram 20 mg Oral Daily   furosemide 20 mg Oral Q12H   gabapentin 400 mg Oral Nightly   hydrALAZINE 100 mg Oral Q8H   isosorbide mononitrate 60 mg Oral Q24H   metoprolol succinate XL 50 mg Oral Q24H   pantoprazole 40 mg Oral QAM   vitamin d 5,000 Units Oral Daily   warfarin 6 mg Oral Daily     Continuous Infusions:  Pharmacy to dose warfarin      PRN Meds:.HYDROcodone-acetaminophen  •  Pharmacy to dose  warfarin    Telemetry:Atrial fibrillation with paced rhythm in the 80s.      Assessment/Plan     1.  Congestive heart failure:  patient with congestive heart failure with an ejection fraction of 35-40% on last echocardiogram.  She was admitted due to volume overload which is likely related to heart failure exacerbation as well as renal failure.  She is currently undergoing diuresis.  She is on metoprolol and BiDil equivalent.    2.  Coronary artery disease: Patient with history of coronary artery disease currently on aspirin, beta blocker and isosorbide mononitrate.  She is not on a statin due to intolerance.   She reports no episodes of chest pain at this point.  We'll continue current regimen.     3.  Dyslipidemia: Patient with history of dyslipidemia for which she is currently on no medication due to statin intolerance.      4.  Hypertension: Patient with a history of hypertension that appears to be mildly uncontrolled.  We will change metoprolol to carvedilol for better heart failure and blood pressure control.     5.  Shortness of breath: Patient with history of shortness of breath but appears to be a combination of heart failure and pulmonary lung disease.     6.   Atrial fibrillation: Patient with a history of atrial fibrillation which is currently well rate controlled on metoprolol.  She is on Coumadin for stroke prevention.    Plan:    1.  Congestive heart failure: At this point will continue diuresis with Lasix 40 mg IV daily.  We will also switch metoprolol to carvedilol for better blood pressure and heart failure control.  We will need to monitor breathing status.    2.  Coronary artery disease: At this point we will continue current regimen.  She has no evidence of an acute coronary event.    3.  Dyslipidemia: We will check lipid panel.    4.  Hypertension: We will change metoprolol to carvedilol as above.    5.  Atrial fibrillation: At this point continue beta blocker for rate control.  The patient is  currently on Coumadin with therapeutic INR.  Would continue current dose.    Nba Arora MD  11/09/17  12:31 PM    Dragon disclaimer:  Much of this encounter note is an electronic transcription/translation of spoken language to printed text. The electronic translation of spoken language may permit erroneous, or at times, nonsensical words or phrases to be inadvertently transcribed; Although I have reviewed the note for such errors, some may still exist.

## 2017-11-09 NOTE — PROGRESS NOTES
Discharge Planning Assessment   [place]     Patient Name: Tre Kinney  MRN: [id number]  Today's Date: 11/9/2017    Admit Date: 11/8/2017    Discharge Needs Assessment       11/09/17 1221    Living Environment    Lives With child(abner), adult   Pt lives at home with her son, Ken.     Transportation Available car;family or friend will provide    Living Environment    Quality Of Family Relationships involved;helpful;supportive    Able to Return to Prior Living Arrangements yes    Discharge Needs Assessment    Outpatient/Agency/Support Group Needs --   Pt does not utilize home health services at this time and denies the need. Pt utilizes [facility] for prescriptions.     Equipment Currently Used at Home walker, rolling;oxygen;commode;shower chair;nebulizer   home 02 @ 2L. DME via [facility].    Equipment Needed After Discharge none    Discharge Disposition home or self-care      Discharge Plan       11/09/17 1224    Case Management/Social Work Plan    Plan Pt lives at home with her son, Ken and plans to return home at discharge. Pt does not currently utilize home health services and denies the need at this time. Pt has all needed DME at home. Pt requested to speak with financial counseling regarding hospital bills. SS contacted financial counseling per Jessica who states she will speak with pt. SS will follow and assist as needed with discharge planning.     Patient/Family In Agreement With Plan yes     Demographic Summary       11/09/17 1218    Referral Information    Admission Type observation    Referral Source nursing    Reason For Consult --   Discharge planning. SS spoke to pt.     Primary Care Physician Information    Name Dr. Barone.       Legal       11/09/17 1218    Legal    Legal Comments Pt does not have a POA or advance directive and is not interested in information at this time.     Kelli Joshua

## 2017-11-09 NOTE — NURSING NOTE
Phoned Fisher Gregoria, son of Tre Meadows to inform the family that pt has been transferred to room 302B from 306A. Phillip was appreciative of the notification.

## 2017-11-09 NOTE — PROGRESS NOTES
Tre Gregoria 84 y.o.   11/09/17    Subjective: Patient feeling better with decreased shortness of breath and decreased swelling after she diuresed well yesterday  Objective: Signs stable chest with crackles in the bases abdomen soft  Vital Signs (last 72 hrs)       11/05 0700  -  11/06 0659 11/06 0700  -  11/07 0659 11/07 0700  -  11/08 0659 11/08 0700  -  11/09 0619   Most Recent    Temp (°F)       97.5 -  98     97.5 (36.4)    Heart Rate       78 -  83     78    Resp       18 -  22     18    BP       133/86 -  148/89     148/89    SpO2 (%)       92 -  99     99        Lab Results (last 72 hours)     Procedure Component Value Units Date/Time    CBC & Differential [[id number]] Collected:  11/08/17 1529    Specimen:  Blood Updated:  11/08/17 1551    Narrative:       The following orders were created for panel order CBC & Differential.  Procedure                               Abnormality         Status                     ---------                               -----------         ------                     CBC Auto Differential[[id number]]        Abnormal            Final result                 Please view results for these tests on the individual orders.    CBC Auto Differential [[id number]]  (Abnormal) Collected:  11/08/17 1529    Specimen:  Blood Updated:  11/08/17 1551     WBC 6.98 10*3/mm3      RBC 3.93 (L) 10*6/mm3      Hemoglobin 11.1 (L) g/dL      Hematocrit 34.0 (L) %      MCV 86.5 fL      MCH 28.2 pg      MCHC 32.6 (L) g/dL      RDW 15.6 (H) %      RDW-SD 49.2 fl      MPV 9.8 fL      Platelets 189 10*3/mm3      Neutrophil % 84.1 (H) %      Lymphocyte % 8.3 (L) %      Monocyte % 6.0 %      Eosinophil % 0.9 %      Basophil % 0.6 %      Immature Grans % 0.1 %      Neutrophils, Absolute 5.87 10*3/mm3      Lymphocytes, Absolute 0.58 (L) 10*3/mm3      Monocytes, Absolute 0.42 10*3/mm3      Eosinophils, Absolute 0.06 10*3/mm3      Basophils, Absolute 0.04 10*3/mm3      Immature Grans, Absolute 0.01 10*3/mm3      Protime-INR [[id number]]  (Abnormal) Collected:  11/08/17 1526    Specimen:  Blood Updated:  11/08/17 1610     Protime 26.6 (H) Seconds       Note new Reference Range        INR 2.41 (H)    Narrative:       Suggested INR therapeutic range for stable oral anticoagulant therapy:    Low Intensity therapy:   1.5-2.0  Moderate Intensity therapy:   2.0-3.0  High Intensity therapy:   2.5-4.0    Basic Metabolic Panel [[id number]]  (Abnormal) Collected:  11/08/17 1529    Specimen:  Blood Updated:  11/08/17 1622     Glucose 105 mg/dL      BUN 47 (H) mg/dL      Creatinine 2.28 (H) mg/dL      Sodium 137 mmol/L      Potassium 4.2 mmol/L      Chloride 103 mmol/L      CO2 27.3 mmol/L      Calcium 9.1 mg/dL      eGFR Non African Amer 20 (L) mL/min/1.73      BUN/Creatinine Ratio 20.6     Anion Gap 6.7 mmol/L     Narrative:       The MDRD GFR formula is only valid for adults with stable renal function between ages 18 and 70.    Osmolality, Calculated [[id number]]  (Normal) Collected:  11/08/17 1529    Specimen:  Blood Updated:  11/08/17 1622     Osmolality Calc 286.4 mOsm/kg     BNP [[id number]]  (Abnormal) Collected:  11/08/17 1529    Specimen:  Blood Updated:  11/08/17 1638     BNP 1516.0 (H) pg/mL     Blood Gas, Arterial [[id number]]  (Abnormal) Collected:  11/08/17 1649    Specimen:  Arterial Blood Updated:  11/08/17 1704     Site Arterial: right brachial     Devang's Test Positive     pH, Arterial 7.408 pH units      pCO2, Arterial 37.0 mm Hg      pO2, Arterial 94.5 mm Hg      HCO3, Arterial 22.8 mmol/L      Base Excess, Arterial -1.5 mmol/L      O2 Saturation, Arterial 97.1 %      Hemoglobin, Blood Gas 11.2 (L) g/dL      Hematocrit, Blood Gas 33.0 (L) %      Oxyhemoglobin 96.2 %      Methemoglobin 0.10 %      Carboxyhemoglobin 0.8 %      A-a Gradiant 52.8 mmHg      Temperature 98.6 C      Barometric Pressure for Blood Gas 729 mmHg      Modality Nasal Cannula     FIO2 28 %     Protime-INR [[id number]]  (Abnormal) Collected:  11/09/17  0315    Specimen:  Blood Updated:  11/09/17 0334     Protime 27.4 (H) Seconds       Note new Reference Range        INR 2.51 (H)    Narrative:       Suggested INR therapeutic range for stable oral anticoagulant therapy:    Low Intensity therapy:   1.5-2.0  Moderate Intensity therapy:   2.0-3.0  High Intensity therapy:   2.5-4.0        Imaging Results (last 24 hours)     Procedure Component Value Units Date/Time    XR Chest AP [[id number]] Updated:  11/08/17 9067        Assessment:Active Problems:    * No active hospital problems. *   Acute on chronic systolic heart failure  Chronic kidney disease  Plan: Continue to diurese and monitor fluid status and kidney function

## 2017-11-10 PROBLEM — I50.9 CONGESTIVE HEART FAILURE: Status: ACTIVE | Noted: 2017-11-10

## 2017-11-10 LAB
ANION GAP SERPL CALCULATED.3IONS-SCNC: 5.8 MMOL/L (ref 3.6–11.2)
BUN BLD-MCNC: 39 MG/DL (ref 7–21)
BUN/CREAT SERPL: 19.2 (ref 7–25)
CALCIUM SPEC-SCNC: 8.8 MG/DL (ref 7.7–10)
CHLORIDE SERPL-SCNC: 104 MMOL/L (ref 99–112)
CHOLEST SERPL-MCNC: 122 MG/DL (ref 0–200)
CO2 SERPL-SCNC: 30.2 MMOL/L (ref 24.3–31.9)
CREAT BLD-MCNC: 2.03 MG/DL (ref 0.43–1.29)
GFR SERPL CREATININE-BSD FRML MDRD: 23 ML/MIN/1.73
GLUCOSE BLD-MCNC: 93 MG/DL (ref 70–110)
HDLC SERPL-MCNC: 37 MG/DL (ref 60–100)
INR PPP: 2.51 (ref 0.9–1.1)
LDLC SERPL CALC-MCNC: 73 MG/DL (ref 0–100)
LDLC/HDLC SERPL: 1.98 {RATIO}
OSMOLALITY SERPL CALC.SUM OF ELEC: 288.5 MOSM/KG (ref 273–305)
POTASSIUM BLD-SCNC: 3.8 MMOL/L (ref 3.5–5.3)
PROTHROMBIN TIME: 27.5 SECONDS (ref 11–15.4)
SODIUM BLD-SCNC: 140 MMOL/L (ref 135–153)
TRIGL SERPL-MCNC: 59 MG/DL (ref 0–150)
TSH SERPL DL<=0.05 MIU/L-ACNC: 5 MIU/ML (ref 0.55–4.78)
VLDLC SERPL-MCNC: 11.8 MG/DL

## 2017-11-10 PROCEDURE — C1751 CATH, INF, PER/CENT/MIDLINE: HCPCS

## 2017-11-10 PROCEDURE — 85610 PROTHROMBIN TIME: CPT | Performed by: FAMILY MEDICINE

## 2017-11-10 PROCEDURE — 80048 BASIC METABOLIC PNL TOTAL CA: CPT | Performed by: FAMILY MEDICINE

## 2017-11-10 PROCEDURE — 25010000002 FUROSEMIDE PER 20 MG: Performed by: INTERNAL MEDICINE

## 2017-11-10 PROCEDURE — 84443 ASSAY THYROID STIM HORMONE: CPT | Performed by: INTERNAL MEDICINE

## 2017-11-10 PROCEDURE — 80061 LIPID PANEL: CPT | Performed by: INTERNAL MEDICINE

## 2017-11-10 PROCEDURE — 99232 SBSQ HOSP IP/OBS MODERATE 35: CPT | Performed by: INTERNAL MEDICINE

## 2017-11-10 RX ORDER — LIDOCAINE HYDROCHLORIDE 10 MG/ML
10 INJECTION, SOLUTION INFILTRATION; PERINEURAL ONCE
Status: COMPLETED | OUTPATIENT
Start: 2017-11-10 | End: 2017-11-10

## 2017-11-10 RX ORDER — ECHINACEA PURPUREA EXTRACT 125 MG
2 TABLET ORAL AS NEEDED
Status: DISCONTINUED | OUTPATIENT
Start: 2017-11-10 | End: 2017-11-17 | Stop reason: HOSPADM

## 2017-11-10 RX ADMIN — CARVEDILOL 12.5 MG: 6.25 TABLET, FILM COATED ORAL at 21:16

## 2017-11-10 RX ADMIN — CARVEDILOL 12.5 MG: 6.25 TABLET, FILM COATED ORAL at 09:04

## 2017-11-10 RX ADMIN — ISOSORBIDE MONONITRATE 60 MG: 60 TABLET, EXTENDED RELEASE ORAL at 09:03

## 2017-11-10 RX ADMIN — FUROSEMIDE 40 MG: 10 INJECTION, SOLUTION INTRAMUSCULAR; INTRAVENOUS at 09:05

## 2017-11-10 RX ADMIN — HYDRALAZINE HYDROCHLORIDE 100 MG: 50 TABLET, FILM COATED ORAL at 05:44

## 2017-11-10 RX ADMIN — CITALOPRAM HYDROBROMIDE 20 MG: 20 TABLET ORAL at 09:04

## 2017-11-10 RX ADMIN — LIDOCAINE HYDROCHLORIDE 0.5 ML: 10 INJECTION, SOLUTION INFILTRATION; PERINEURAL at 10:51

## 2017-11-10 RX ADMIN — PANTOPRAZOLE SODIUM 40 MG: 40 TABLET, DELAYED RELEASE ORAL at 05:44

## 2017-11-10 RX ADMIN — GABAPENTIN 400 MG: 400 CAPSULE ORAL at 21:16

## 2017-11-10 RX ADMIN — CHOLECALCIFEROL CAP 125 MCG (5000 UNIT) 5000 UNITS: 125 CAP at 09:03

## 2017-11-10 RX ADMIN — HYDRALAZINE HYDROCHLORIDE 100 MG: 50 TABLET, FILM COATED ORAL at 21:16

## 2017-11-10 RX ADMIN — WARFARIN SODIUM 6 MG: 3 TABLET ORAL at 16:53

## 2017-11-10 RX ADMIN — ASPIRIN 81 MG: 81 TABLET ORAL at 09:05

## 2017-11-10 RX ADMIN — HYDROCODONE BITARTRATE AND ACETAMINOPHEN 1 TABLET: 7.5; 325 TABLET ORAL at 16:15

## 2017-11-10 RX ADMIN — HYDRALAZINE HYDROCHLORIDE 100 MG: 50 TABLET, FILM COATED ORAL at 14:00

## 2017-11-10 NOTE — PROGRESS NOTES
Discharge Planning Assessment   [place]     Patient Name: Tre Kinney  MRN: [id number]  Today's Date: 11/10/2017    Admit Date: 11/8/2017          Discharge Needs Assessment     None            Discharge Plan       11/10/17 1025    Case Management/Social Work Plan    Plan Pt admitted on 11/10/17.  Pt lives at home with son, Ken, and plans to return home at discharge.   Pt currently does not utilize home health services.  Pt currently utilizes rolling walker, bedside commode, shower chair, nebulizer and home 02 via [facility].  SS will follow and assist with discharge needs.     Patient/Family In Agreement With Plan yes        Discharge Placement     No information found                Demographic Summary     None            Functional Status     None            Psychosocial     None            Abuse/Neglect     None            Legal     None            Substance Abuse     None            Patient Forms     None          Mary Salgado

## 2017-11-10 NOTE — PROGRESS NOTES
LOS: 0 days   Patient Care Team:  Myles Barone MD as PCP - General  Myles Barone MD as PCP - Family Medicine  Myles Barone MD as PCP - Claims Attributed  Kelli Márquez RN as Care Coordinator (Population Health)      Subjective     Admission information:    84-year-old woman with history of arteriosclerotic CAD status post stent done about 3 years ago-details not known and ischemic cardiomyopathy with ejection fraction 35-40% per echocardiogram done 7/2017 and repeated hospitalization for CHF has been admitted with chief complaint of increasing shortness of breath upon exertion which has been progressive and now gets this symptom on minimal exertion associated with progressive bilateral leg edema.  She is taking furosemide 40 mg by mouth daily at home with no improvement in her symptoms.  Associated with generalized weakness and easy fatigability.  Her BNP level was more than 1500 and chest x-ray showed left-sided pleural effusion and possible bypass graft consolidation.  Patient has chronic kidney disease with creatinine of 2.2.  Patient was given furosemide 40 mg IV ×1.     She denied history of chest pain or angina.  No history of dizziness, palpitation or syncope.     Her cardiovascular history is remarkable for arteriosclerotic CAD status post stent in 2014-details not known, chronic systolic CHF, ischemic cardiomyopathy with EF 35-40%, atrial fibrillation, long-term anticoagulation with Coumadin, sick sinus syndrome and status post pacemaker implantation.     Cardiac risk factors-hypertension, hyperlipidemia and family history of heart disease.  No history of smoking.     She has chronic kidney disease with creatinine of 2.2.    Interval History:     The patient states she is feeling better today.  She denies any chest pain, shortness breath or palpitations.  She states her lower extremity edema is significantly improved.    History taken from: patient chart    Vital Signs  Temp:   [97.4 °F (36.3 °C)-97.8 °F (36.6 °C)] 97.5 °F (36.4 °C)  Heart Rate:  [78-89] 89  Resp:  [18] 18  BP: (120-145)/(69-86) 129/70    Physical Exam:     Physical Exam   Constitutional: She is oriented to person, place, and time. She appears well-developed and well-nourished.   Elderly and obese white female sitting comfortably on bed.   HENT:   Mouth/Throat: Oropharynx is clear and moist.   Eyes: EOM are normal. Pupils are equal, round, and reactive to light.   Neck: Neck supple. No JVD present. No tracheal deviation present. No thyromegaly present.   Cardiovascular: Normal rate, regular rhythm, S1 normal and S2 normal.  Exam reveals no gallop and no friction rub.    Murmur heard.   Harsh midsystolic murmur is present with a grade of 2/6  at the upper right sternal border radiating to the neck  Pulmonary/Chest: Effort normal. No respiratory distress. She has no wheezes. She has rales.   Abdominal: Soft. Bowel sounds are normal. She exhibits no mass. There is no tenderness.   Musculoskeletal: Normal range of motion. She exhibits edema (Moderate bilateral lower extremity edema.).   Lymphadenopathy:     She has no cervical adenopathy.   Neurological: She is alert and oriented to person, place, and time.   Skin: Skin is warm and dry. No rash noted.   Psychiatric: She has a normal mood and affect.       Results Review:    I reviewed the patient's new clinical results.  I reviewed the patient's new imaging results and agree with the interpretation.  I reviewed the patient's other test results and agree with the interpretation  I personally viewed and interpreted the patient's EKG/Telemetry data    Medication:  Scheduled Meds:    aspirin 81 mg Oral Daily   carvedilol 12.5 mg Oral Q12H   citalopram 20 mg Oral Daily   furosemide 40 mg Intravenous Daily   gabapentin 400 mg Oral Nightly   hydrALAZINE 100 mg Oral Q8H   isosorbide mononitrate 60 mg Oral Q24H   pantoprazole 40 mg Oral QAM   vitamin d 5,000 Units Oral Daily   warfarin  6 mg Oral Daily     Continuous Infusions:    Pharmacy to dose warfarin      PRN Meds:.HYDROcodone-acetaminophen  •  Pharmacy to dose warfarin    Telemetry:Atrial fibrillation with paced rhythm in the 80s.      Assessment/Plan     1.  Congestive heart failure:  patient with congestive heart failure with an ejection fraction of 35-40% on last echocardiogram.  She was admitted due to volume overload which is likely related to heart failure exacerbation as well as renal failure.  She is currently undergoing diuresis.  She is on metoprolol and BiDil equivalent.    2.  Coronary artery disease: Patient with history of coronary artery disease currently on aspirin, beta blocker and isosorbide mononitrate.  She is not on a statin due to intolerance.   She reports no episodes of chest pain at this point.  We'll continue current regimen.     3.  Dyslipidemia: Patient with history of dyslipidemia for which she is currently on no medication due to statin intolerance.      4.  Hypertension: Patient with a history of hypertension that appears to be well-controlled on current regimen.     5.  Shortness of breath: Patient with history of shortness of breath but appears to be a combination of heart failure and pulmonary lung disease.     6.   Atrial fibrillation: Patient with a history of atrial fibrillation which is currently well rate controlled on metoprolol.  She is on Coumadin for stroke prevention.    Plan:    1.  Congestive heart failure: The patient appears to be responding well to current diuretic dose and her renal function appears to be stable.  At this point we'll continue current regimen.    2.  Coronary artery disease: At this point we will continue current regimen.  She has no evidence of an acute coronary event.    3.  Dyslipidemia: The patient's lipid panel shows adequate lipid control on no medication.  She is currently intolerant to statins.  At this point would continue to monitor.    4.  Hypertension: The  patient's blood pressure appears to be well-controlled on current regimen.  We will continue to monitor.    5.  Atrial fibrillation: At this point continue beta blocker for rate control.  The patient is currently on Coumadin with therapeutic INR.  Would continue current dose.    Nba Arora MD  11/10/17  8:42 AM    Dragon disclaimer:  Much of this encounter note is an electronic transcription/translation of spoken language to printed text. The electronic translation of spoken language may permit erroneous, or at times, nonsensical words or phrases to be inadvertently transcribed; Although I have reviewed the note for such errors, some may still exist.

## 2017-11-10 NOTE — PROGRESS NOTES
Tre Kinney 84 y.o.   11/10/17    Subjective: Patient feeling better and diuresing well  Objective: STABLE.  CRACKLES IN THE BASES POST EDEMA LEG  Vital Signs (last 72 hrs)       11/06 0700  -  11/07 0659 11/07 0700  -  11/08 0659 11/08 0700  -  11/09 0659 11/09 0700  -  11/10 0623   Most Recent    Temp (°F)     97.5 -  98    97.4 -  97.8     97.5 (36.4)    Heart Rate     78 -  83    78 -  89     89    Resp     18 -  22      18     18    BP     133/86 -  148/89    120/69 -  145/86     129/70    SpO2 (%)     92 -  99    97 -  99     97        Lab Results (last 72 hours)     Procedure Component Value Units Date/Time    CBC & Differential [[id number]] Collected:  11/08/17 1529    Specimen:  Blood Updated:  11/08/17 1551    Narrative:       The following orders were created for panel order CBC & Differential.  Procedure                               Abnormality         Status                     ---------                               -----------         ------                     CBC Auto Differential[[id number]]        Abnormal            Final result                 Please view results for these tests on the individual orders.    CBC Auto Differential [[id number]]  (Abnormal) Collected:  11/08/17 1529    Specimen:  Blood Updated:  11/08/17 1551     WBC 6.98 10*3/mm3      RBC 3.93 (L) 10*6/mm3      Hemoglobin 11.1 (L) g/dL      Hematocrit 34.0 (L) %      MCV 86.5 fL      MCH 28.2 pg      MCHC 32.6 (L) g/dL      RDW 15.6 (H) %      RDW-SD 49.2 fl      MPV 9.8 fL      Platelets 189 10*3/mm3      Neutrophil % 84.1 (H) %      Lymphocyte % 8.3 (L) %      Monocyte % 6.0 %      Eosinophil % 0.9 %      Basophil % 0.6 %      Immature Grans % 0.1 %      Neutrophils, Absolute 5.87 10*3/mm3      Lymphocytes, Absolute 0.58 (L) 10*3/mm3      Monocytes, Absolute 0.42 10*3/mm3      Eosinophils, Absolute 0.06 10*3/mm3      Basophils, Absolute 0.04 10*3/mm3      Immature Grans, Absolute 0.01 10*3/mm3     Protime-INR [[id number]]   (Abnormal) Collected:  11/08/17 1526    Specimen:  Blood Updated:  11/08/17 1610     Protime 26.6 (H) Seconds       Note new Reference Range        INR 2.41 (H)    Narrative:       Suggested INR therapeutic range for stable oral anticoagulant therapy:    Low Intensity therapy:   1.5-2.0  Moderate Intensity therapy:   2.0-3.0  High Intensity therapy:   2.5-4.0    Basic Metabolic Panel [[id number]]  (Abnormal) Collected:  11/08/17 1529    Specimen:  Blood Updated:  11/08/17 1622     Glucose 105 mg/dL      BUN 47 (H) mg/dL      Creatinine 2.28 (H) mg/dL      Sodium 137 mmol/L      Potassium 4.2 mmol/L      Chloride 103 mmol/L      CO2 27.3 mmol/L      Calcium 9.1 mg/dL      eGFR Non African Amer 20 (L) mL/min/1.73      BUN/Creatinine Ratio 20.6     Anion Gap 6.7 mmol/L     Narrative:       The MDRD GFR formula is only valid for adults with stable renal function between ages 18 and 70.    Osmolality, Calculated [[id number]]  (Normal) Collected:  11/08/17 1529    Specimen:  Blood Updated:  11/08/17 1622     Osmolality Calc 286.4 mOsm/kg     BNP [[id number]]  (Abnormal) Collected:  11/08/17 1529    Specimen:  Blood Updated:  11/08/17 1638     BNP 1516.0 (H) pg/mL     Blood Gas, Arterial [[id number]]  (Abnormal) Collected:  11/08/17 1649    Specimen:  Arterial Blood Updated:  11/08/17 1704     Site Arterial: right brachial     Devang's Test Positive     pH, Arterial 7.408 pH units      pCO2, Arterial 37.0 mm Hg      pO2, Arterial 94.5 mm Hg      HCO3, Arterial 22.8 mmol/L      Base Excess, Arterial -1.5 mmol/L      O2 Saturation, Arterial 97.1 %      Hemoglobin, Blood Gas 11.2 (L) g/dL      Hematocrit, Blood Gas 33.0 (L) %      Oxyhemoglobin 96.2 %      Methemoglobin 0.10 %      Carboxyhemoglobin 0.8 %      A-a Gradiant 52.8 mmHg      Temperature 98.6 C      Barometric Pressure for Blood Gas 729 mmHg      Modality Nasal Cannula     FIO2 28 %     Protime-INR [[id number]]  (Abnormal) Collected:  11/09/17 0315    Specimen:  Blood  Updated:  11/09/17 0334     Protime 27.4 (H) Seconds       Note new Reference Range        INR 2.51 (H)    Narrative:       Suggested INR therapeutic range for stable oral anticoagulant therapy:    Low Intensity therapy:   1.5-2.0  Moderate Intensity therapy:   2.0-3.0  High Intensity therapy:   2.5-4.0    Protime-INR [[id number]]  (Abnormal) Collected:  11/10/17 0203    Specimen:  Blood Updated:  11/10/17 0229     Protime 27.5 (H) Seconds       Note new Reference Range        INR 2.51 (H)    Narrative:       Suggested INR therapeutic range for stable oral anticoagulant therapy:    Low Intensity therapy:   1.5-2.0  Moderate Intensity therapy:   2.0-3.0  High Intensity therapy:   2.5-4.0    Lipid Panel [[id number]]  (Abnormal) Collected:  11/10/17 0203    Specimen:  Blood Updated:  11/10/17 0235     Total Cholesterol 122 mg/dL      Triglycerides 59 mg/dL      HDL Cholesterol 37 (L) mg/dL      LDL Cholesterol  73 mg/dL      VLDL Cholesterol 11.8 mg/dL      LDL/HDL Ratio 1.98    Narrative:       Cholesterol Reference Ranges  (U.S[place] Department of Health and Human Services ATP III Classifications)    Desirable          <200 mg/dL  Borderline High    200-239 mg/dL  High Risk          >240 mg/dL      Triglyceride Reference Ranges  (U.S. Department of Health and Human Services ATP III Classifications)    Normal           <150 mg/dL  Borderline High  150-199 mg/dL  High             200-499 mg/dL  Very High        >500 mg/dL    HDL Reference Ranges  (U.S. Department of Health and Human Services ATP III Classifcations)    Low     <40 mg/dl (major risk factor for CHD)  High    >60 mg/dl ('negative' risk factor for CHD)        LDL Reference Ranges  (U.S. Department of Health and Human Services ATP III Classifcations)    Optimal          <100 mg/dL  Near Optimal     100-129 mg/dL  Borderline High  130-159 mg/dL  High             160-189 mg/dL  Very High        >189 mg/dL    TSH [[id number]]  (Abnormal) Collected:  11/10/17 0203     Specimen:  Blood Updated:  11/10/17 0242     TSH 4.999 (H) mIU/mL         Imaging Results (last 24 hours)     Procedure Component Value Units Date/Time    XR Chest AP [[id number]] Collected:  11/09/17 0737     Updated:  11/09/17 0740    Narrative:       EXAMINATION: XR CHEST AP-      CLINICAL INDICATION:     CHF     TECHNIQUE:  XR CHEST AP-      COMPARISON: 10/10/2017      FINDINGS:   Improvement in bilateral airspace disease. Persistent opacities left  lung base.   Cardiomegaly is noted.   No pneumothorax.   No pleural effusion.   No acute osseous findings.  Left-sided cardiac pacer.       Impression:       Overall slight improvement since the previous exam again  demonstrating probable changes of CHF with left basilar atelectasis.     This report was finalized on 11/9/2017 7:37 AM by Dr. Andres Guerra MD.           Assessment:Active Problems:    CHF (congestive heart failure)     Plan:Continue with diuresis and continue to monitor her kidneys and her underlying chronic kidney

## 2017-11-11 LAB
CK MB SERPL-CCNC: 1.79 NG/ML (ref 0–5)
CK MB SERPL-RTO: 5.6 % (ref 0–3)
CK SERPL-CCNC: 32 U/L (ref 24–173)
INR PPP: 2.75 (ref 0.9–1.1)
PROTHROMBIN TIME: 29.5 SECONDS (ref 11–15.4)
TROPONIN I SERPL-MCNC: 0.04 NG/ML

## 2017-11-11 PROCEDURE — 25010000002 FUROSEMIDE PER 20 MG: Performed by: INTERNAL MEDICINE

## 2017-11-11 PROCEDURE — 84484 ASSAY OF TROPONIN QUANT: CPT | Performed by: FAMILY MEDICINE

## 2017-11-11 PROCEDURE — 82553 CREATINE MB FRACTION: CPT | Performed by: FAMILY MEDICINE

## 2017-11-11 PROCEDURE — 85610 PROTHROMBIN TIME: CPT | Performed by: FAMILY MEDICINE

## 2017-11-11 PROCEDURE — 82550 ASSAY OF CK (CPK): CPT | Performed by: FAMILY MEDICINE

## 2017-11-11 PROCEDURE — 93010 ELECTROCARDIOGRAM REPORT: CPT | Performed by: INTERNAL MEDICINE

## 2017-11-11 PROCEDURE — 93005 ELECTROCARDIOGRAM TRACING: CPT | Performed by: FAMILY MEDICINE

## 2017-11-11 RX ORDER — NITROGLYCERIN 0.4 MG/1
TABLET SUBLINGUAL
Status: COMPLETED
Start: 2017-11-11 | End: 2017-11-11

## 2017-11-11 RX ADMIN — CARVEDILOL 12.5 MG: 6.25 TABLET, FILM COATED ORAL at 08:45

## 2017-11-11 RX ADMIN — HYDRALAZINE HYDROCHLORIDE 100 MG: 50 TABLET, FILM COATED ORAL at 14:00

## 2017-11-11 RX ADMIN — CHOLECALCIFEROL CAP 125 MCG (5000 UNIT) 5000 UNITS: 125 CAP at 08:44

## 2017-11-11 RX ADMIN — CARVEDILOL 12.5 MG: 6.25 TABLET, FILM COATED ORAL at 21:03

## 2017-11-11 RX ADMIN — HYDRALAZINE HYDROCHLORIDE 100 MG: 50 TABLET, FILM COATED ORAL at 21:02

## 2017-11-11 RX ADMIN — GABAPENTIN 400 MG: 400 CAPSULE ORAL at 21:03

## 2017-11-11 RX ADMIN — ASPIRIN 81 MG: 81 TABLET ORAL at 08:45

## 2017-11-11 RX ADMIN — HYDRALAZINE HYDROCHLORIDE 100 MG: 50 TABLET, FILM COATED ORAL at 05:35

## 2017-11-11 RX ADMIN — WARFARIN SODIUM 6 MG: 3 TABLET ORAL at 17:11

## 2017-11-11 RX ADMIN — HYDROCODONE BITARTRATE AND ACETAMINOPHEN 1 TABLET: 7.5; 325 TABLET ORAL at 21:06

## 2017-11-11 RX ADMIN — NITROGLYCERIN: 0.4 TABLET SUBLINGUAL at 18:40

## 2017-11-11 RX ADMIN — CITALOPRAM HYDROBROMIDE 20 MG: 20 TABLET ORAL at 08:45

## 2017-11-11 RX ADMIN — FUROSEMIDE 40 MG: 10 INJECTION, SOLUTION INTRAMUSCULAR; INTRAVENOUS at 08:45

## 2017-11-11 RX ADMIN — PANTOPRAZOLE SODIUM 40 MG: 40 TABLET, DELAYED RELEASE ORAL at 05:35

## 2017-11-11 RX ADMIN — HYDROCODONE BITARTRATE AND ACETAMINOPHEN 1 TABLET: 7.5; 325 TABLET ORAL at 08:44

## 2017-11-11 RX ADMIN — ISOSORBIDE MONONITRATE 60 MG: 60 TABLET, EXTENDED RELEASE ORAL at 08:45

## 2017-11-11 NOTE — PLAN OF CARE
Problem: Fluid Volume Excess (Adult,Obstetrics,Pediatric)  Goal: Identify Related Risk Factors and Signs and Symptoms  Outcome: Ongoing (interventions implemented as appropriate)    11/11/17 0011   Fluid Volume Excess   Fluid Volume Excess: Related Risk Factors disease process   Signs and Symptoms (Fluid Volume Excess) activity intolerance;edema

## 2017-11-11 NOTE — SIGNIFICANT NOTE
Patient complains of chest pain after eating dinner.  She states she has this often and nitro relieves the pain.  Obtained EKG, and Cardiac enzymes.  Patient received one nitro and states the pain has subsided.  States she is no longer having pain.  Will continue to monitor closely.  EKG represents the same as last EKG obtained.

## 2017-11-11 NOTE — PLAN OF CARE
Problem: Pain, Acute (Adult)  Goal: Acceptable Pain Control/Comfort Level  Outcome: Ongoing (interventions implemented as appropriate)    11/11/17 0011   Pain, Acute (Adult)   Acceptable Pain Control/Comfort Level making progress toward outcome

## 2017-11-11 NOTE — PLAN OF CARE
Problem: Pressure Ulcer Risk (Diogo Scale) (Adult,Obstetrics,Pediatric)  Goal: Skin Integrity  Outcome: Ongoing (interventions implemented as appropriate)    11/11/17 0010   Pressure Ulcer Risk (Diogo Scale) (Adult,Obstetrics,Pediatric)   Skin Integrity making progress toward outcome

## 2017-11-12 LAB
ANION GAP SERPL CALCULATED.3IONS-SCNC: 3.8 MMOL/L (ref 3.6–11.2)
BASOPHILS # BLD AUTO: 0.03 10*3/MM3 (ref 0–0.3)
BASOPHILS NFR BLD AUTO: 0.6 % (ref 0–2)
BUN BLD-MCNC: 44 MG/DL (ref 7–21)
BUN/CREAT SERPL: 21.9 (ref 7–25)
CALCIUM SPEC-SCNC: 8.5 MG/DL (ref 7.7–10)
CHLORIDE SERPL-SCNC: 101 MMOL/L (ref 99–112)
CO2 SERPL-SCNC: 31.2 MMOL/L (ref 24.3–31.9)
CREAT BLD-MCNC: 2.01 MG/DL (ref 0.43–1.29)
DEPRECATED RDW RBC AUTO: 50 FL (ref 37–54)
EOSINOPHIL # BLD AUTO: 0.2 10*3/MM3 (ref 0–0.7)
EOSINOPHIL NFR BLD AUTO: 4.1 % (ref 0–7)
ERYTHROCYTE [DISTWIDTH] IN BLOOD BY AUTOMATED COUNT: 15.9 % (ref 11.5–14.5)
GFR SERPL CREATININE-BSD FRML MDRD: 24 ML/MIN/1.73
GLUCOSE BLD-MCNC: 110 MG/DL (ref 70–110)
HCT VFR BLD AUTO: 30.9 % (ref 37–47)
HGB BLD-MCNC: 9.7 G/DL (ref 12–16)
IMM GRANULOCYTES # BLD: 0 10*3/MM3 (ref 0–0.03)
IMM GRANULOCYTES NFR BLD: 0 % (ref 0–0.5)
INR PPP: 2.64 (ref 0.9–1.1)
LYMPHOCYTES # BLD AUTO: 0.71 10*3/MM3 (ref 1–3)
LYMPHOCYTES NFR BLD AUTO: 14.7 % (ref 16–46)
MCH RBC QN AUTO: 27.6 PG (ref 27–33)
MCHC RBC AUTO-ENTMCNC: 31.4 G/DL (ref 33–37)
MCV RBC AUTO: 88 FL (ref 80–94)
MONOCYTES # BLD AUTO: 0.45 10*3/MM3 (ref 0.1–0.9)
MONOCYTES NFR BLD AUTO: 9.3 % (ref 0–12)
NEUTROPHILS # BLD AUTO: 3.44 10*3/MM3 (ref 1.4–6.5)
NEUTROPHILS NFR BLD AUTO: 71.3 % (ref 40–75)
OSMOLALITY SERPL CALC.SUM OF ELEC: 283.8 MOSM/KG (ref 273–305)
PLATELET # BLD AUTO: 172 10*3/MM3 (ref 130–400)
PMV BLD AUTO: 9.6 FL (ref 6–10)
POTASSIUM BLD-SCNC: 4 MMOL/L (ref 3.5–5.3)
PROTHROMBIN TIME: 28.6 SECONDS (ref 11–15.4)
RBC # BLD AUTO: 3.51 10*6/MM3 (ref 4.2–5.4)
SODIUM BLD-SCNC: 136 MMOL/L (ref 135–153)
TROPONIN I SERPL-MCNC: 0.04 NG/ML
TROPONIN I SERPL-MCNC: 0.05 NG/ML
WBC NRBC COR # BLD: 4.83 10*3/MM3 (ref 4.5–12.5)

## 2017-11-12 PROCEDURE — 85610 PROTHROMBIN TIME: CPT | Performed by: FAMILY MEDICINE

## 2017-11-12 PROCEDURE — 84484 ASSAY OF TROPONIN QUANT: CPT | Performed by: FAMILY MEDICINE

## 2017-11-12 PROCEDURE — 85025 COMPLETE CBC W/AUTO DIFF WBC: CPT | Performed by: FAMILY MEDICINE

## 2017-11-12 PROCEDURE — 99232 SBSQ HOSP IP/OBS MODERATE 35: CPT | Performed by: INTERNAL MEDICINE

## 2017-11-12 PROCEDURE — 25010000002 FUROSEMIDE PER 20 MG: Performed by: INTERNAL MEDICINE

## 2017-11-12 PROCEDURE — 80048 BASIC METABOLIC PNL TOTAL CA: CPT | Performed by: FAMILY MEDICINE

## 2017-11-12 RX ORDER — FUROSEMIDE 10 MG/ML
40 INJECTION INTRAMUSCULAR; INTRAVENOUS EVERY 12 HOURS
Status: DISCONTINUED | OUTPATIENT
Start: 2017-11-12 | End: 2017-11-13

## 2017-11-12 RX ORDER — SPIRONOLACTONE 25 MG/1
25 TABLET ORAL DAILY
Status: DISCONTINUED | OUTPATIENT
Start: 2017-11-12 | End: 2017-11-17 | Stop reason: HOSPADM

## 2017-11-12 RX ORDER — HYDRALAZINE HYDROCHLORIDE 50 MG/1
50 TABLET, FILM COATED ORAL EVERY 8 HOURS SCHEDULED
Status: DISCONTINUED | OUTPATIENT
Start: 2017-11-12 | End: 2017-11-13

## 2017-11-12 RX ADMIN — ISOSORBIDE MONONITRATE 60 MG: 60 TABLET, EXTENDED RELEASE ORAL at 08:53

## 2017-11-12 RX ADMIN — PANTOPRAZOLE SODIUM 40 MG: 40 TABLET, DELAYED RELEASE ORAL at 05:28

## 2017-11-12 RX ADMIN — CARVEDILOL 12.5 MG: 6.25 TABLET, FILM COATED ORAL at 22:12

## 2017-11-12 RX ADMIN — HYDRALAZINE HYDROCHLORIDE 100 MG: 50 TABLET, FILM COATED ORAL at 13:25

## 2017-11-12 RX ADMIN — SPIRONOLACTONE 25 MG: 25 TABLET ORAL at 10:00

## 2017-11-12 RX ADMIN — ASPIRIN 81 MG: 81 TABLET ORAL at 08:53

## 2017-11-12 RX ADMIN — GABAPENTIN 400 MG: 400 CAPSULE ORAL at 22:20

## 2017-11-12 RX ADMIN — WARFARIN SODIUM 6 MG: 3 TABLET ORAL at 17:55

## 2017-11-12 RX ADMIN — CARVEDILOL 12.5 MG: 6.25 TABLET, FILM COATED ORAL at 08:53

## 2017-11-12 RX ADMIN — CHOLECALCIFEROL CAP 125 MCG (5000 UNIT) 5000 UNITS: 125 CAP at 08:53

## 2017-11-12 RX ADMIN — CITALOPRAM HYDROBROMIDE 20 MG: 20 TABLET ORAL at 08:53

## 2017-11-12 RX ADMIN — HYDRALAZINE HYDROCHLORIDE 50 MG: 50 TABLET, FILM COATED ORAL at 22:13

## 2017-11-12 RX ADMIN — HYDROCODONE BITARTRATE AND ACETAMINOPHEN 1 TABLET: 7.5; 325 TABLET ORAL at 08:52

## 2017-11-12 RX ADMIN — FUROSEMIDE 40 MG: 10 INJECTION, SOLUTION INTRAMUSCULAR; INTRAVENOUS at 08:53

## 2017-11-12 RX ADMIN — POLYETHYLENE GLYCOL 3350 17 G: 17 POWDER, FOR SOLUTION ORAL at 10:00

## 2017-11-12 RX ADMIN — HYDRALAZINE HYDROCHLORIDE 100 MG: 50 TABLET, FILM COATED ORAL at 05:28

## 2017-11-12 RX ADMIN — FUROSEMIDE 40 MG: 10 INJECTION, SOLUTION INTRAMUSCULAR; INTRAVENOUS at 22:13

## 2017-11-12 NOTE — H&P
Patient Identification:  Name:  Tre Kinney  Age:  84 y.o.  Sex:  female  :  1932  MRN:  [id number]   Visit Number:  [id number]  Primary Care Physician:  Myles Barone MD       Chief complaint: forms of breath    History of presenting illness:  84 y.o. female history of congestive heart failure presented with increasing shortness of breath dyspnea on exertion and having to sit in a chair to sleep at night over the past few days.  Getting increasing swelling in her legs and was admitted with acute systolic congestive heart failure and will admit for diuresis and continue to monitor renal status which he has chronic kidney disease and her electrolytes.  ---------------------------------------------------------------------------------------------------------------------   Review Of Systems:  See HPI  ---------------------------------------------------------------------------------------------------------------------   Past Medical History:   Diagnosis Date   • Arthritis    • CHF (congestive heart failure)    • Coronary artery disease    • H/O blood clots    • History of transfusion     no reaction    • Hypertension      Past Surgical History:   Procedure Laterality Date   • ABDOMINAL SURGERY     • APPENDECTOMY     • CARDIAC SURGERY     •  SECTION     • CORONARY ANGIOPLASTY WITH STENT PLACEMENT     • PACEMAKER IMPLANTATION      left side    • SKIN BIOPSY     • TUBAL ABDOMINAL LIGATION       Family History   Problem Relation Age of Onset   • Other Mother      cardiac disorder   • Hypertension Mother    • Heart disease Mother    • Thyroid disease Mother    • Other Father      cardiac disorder   • Heart disease Father    • Hypertension Father    • Other Sister      cardiac disorder   • Other Brother      cardiac disorder   • Other Other      cardiac disorder   • Other Other      cardiac disorder     Social History     Social History   • Marital status:      Spouse name: N/A   • Number of  children: N/A   • Years of education: N/A     Social History Main Topics   • Smoking status: Never Smoker   • Smokeless tobacco: Not on file   • Alcohol use No   • Drug use: No   • Sexual activity: Defer     Other Topics Concern   • Not on file     Social History Narrative     ---------------------------------------------------------------------------------------------------------------------   Allergies:  Latex  ---------------------------------------------------------------------------------------------------------------------   Prior to Admission Medications     Prescriptions Last Dose Informant Patient Reported? Taking?    aspirin 81 MG EC tablet 11/8/2017 Self No Yes    Take 1 tablet by mouth Daily.    Cholecalciferol (VITAMIN D3) 5000 units tablet tablet 11/8/2017 Pharmacy Yes Yes    Take 5,000 Units by mouth Daily.    citalopram (CELEXA) 20 MG tablet 11/8/2017 Pharmacy Yes Yes    Take 20 mg by mouth Daily.    furosemide (LASIX) 20 MG tablet 11/8/2017 Pharmacy Yes Yes    Take 20 mg by mouth 2 (Two) Times a Day.    gabapentin (NEURONTIN) 400 MG capsule 11/7/2017 Pharmacy Yes Yes    Take 400 mg by mouth Every Night.    hydrALAZINE (APRESOLINE) 100 MG tablet 11/8/2017 Pharmacy No Yes    Take 1 tablet by mouth Every 8 (Eight) Hours.    HYDROcodone-acetaminophen (NORCO) 7.5-325 MG per tablet 11/7/2017 Pharmacy Yes Yes    Take 1 tablet by mouth 2 (Two) Times a Day As Needed for Moderate Pain .    isosorbide mononitrate (IMDUR) 60 MG 24 hr tablet 11/8/2017 Pharmacy Yes Yes    Take 60 mg by mouth Daily.    metoprolol tartrate (LOPRESSOR) 50 MG tablet 11/8/2017 Pharmacy Yes Yes    Take 50 mg by mouth 2 (Two) Times a Day.    pantoprazole (PROTONIX) 40 MG EC tablet 11/8/2017 Pharmacy Yes Yes    Take 40 mg by mouth Daily.    warfarin (COUMADIN) 4 MG tablet 11/8/2017 Pharmacy Yes Yes    Take 6 mg by mouth Daily.        Hospital Scheduled Meds:    aspirin 81 mg Oral Daily   carvedilol 12.5 mg Oral Q12H   citalopram 20 mg  Oral Daily   furosemide 40 mg Intravenous Daily   gabapentin 400 mg Oral Nightly   hydrALAZINE 100 mg Oral Q8H   isosorbide mononitrate 60 mg Oral Q24H   pantoprazole 40 mg Oral QAM   polyethylene glycol 17 g Oral Daily   spironolactone 25 mg Oral Daily   vitamin d 5,000 Units Oral Daily   warfarin 6 mg Oral Daily       Pharmacy to dose warfarin      ---------------------------------------------------------------------------------------------------------------------   Vital Signs:  Temp:  [97.5 °F (36.4 °C)-97.8 °F (36.6 °C)] 97.6 °F (36.4 °C)  Heart Rate:  [79-81] 80  Resp:  [18-20] 18  BP: (121-155)/(68-82) 124/70  Last 3 weights    11/10/17  0344 11/11/17  0322 11/12/17 0317   Weight: 206 lb (93.4 kg) 213 lb 6 oz (96.8 kg) 207 lb 3.2 oz (94 kg)     Body mass index is 32.45 kg/(m^2).  ---------------------------------------------------------------------------------------------------------------------   Physical Exam:  Constitutional: patient alert is short of breath with this the  HENT: positive JVD   Eyes:    Neck:     Cardiovascular: S1 and S2  Pulmonary/Chest: bilateral crackles diffusely  Abdominal:  soft   Musculoskeletal: +3 edema in the legs   Neurological:  Skin:  Psychiatric:    ---------------------------------------------------------------------------------------------------------------------      ---------------------------------------------------------------------------------------------------------------------     Results from last 7 days  Lab Units 11/12/17  0026 11/11/17  0307 11/10/17  0203 11/09/17  0315 11/08/17  1529 11/08/17  1526   WBC 10*3/mm3 4.83  --   --   --  6.98  --    HEMOGLOBIN g/dL 9.7*  --   --   --  11.1*  --    HEMATOCRIT % 30.9*  --   --   --  34.0*  --    MCV fL 88.0  --   --   --  86.5  --    MCHC g/dL 31.4*  --   --   --  32.6*  --    PLATELETS 10*3/mm3 172  --   --   --  189  --    INR  2.64* 2.75* 2.51* 2.51*  --  2.41*       Results from last 7 days  Lab Units  11/08/17  1649   PH, ARTERIAL pH units 7.408   PO2 ART mm Hg 94.5   PCO2, ARTERIAL mm Hg 37.0   HCO3 ART mmol/L 22.8       Results from last 7 days  Lab Units 11/12/17  0026 11/10/17  0633 11/08/17  1529   SODIUM mmol/L 136 140 137   POTASSIUM mmol/L 4.0 3.8 4.2   CHLORIDE mmol/L 101 104 103   CO2 mmol/L 31.2 30.2 27.3   BUN mg/dL 44* 39* 47*   CREATININE mg/dL 2.01* 2.03* 2.28*   EGFR IF NONAFRICN AM mL/min/1.73 24* 23* 20*   CALCIUM mg/dL 8.5 8.8 9.1   GLUCOSE mg/dL 110 93 105   Estimated Creatinine Clearance: 24.5 mL/min (by C-G formula based on Cr of 2.01).  No results found for: AMMONIA    Results from last 7 days  Lab Units 11/12/17  0657 11/12/17  0026 11/11/17  1844   CK TOTAL U/L  --   --  32   TROPONIN I ng/mL 0.043* 0.046* 0.040   CK MB INDEX %  --   --  5.6*         No results found for: HGBA1C  Lab Results   Component Value Date    TSH 4.999 (H) 11/10/2017     No results found for: PREGTESTUR, PREGSERUM, HCG, HCGQUANT  Pain Management Panel     Pain Management Panel Latest Ref Rng & Units 6/10/2017 6/10/2017    CREATININE UR mg/dL 114.0 114.0    AMPHETAMINES SCREEN, URINE Negative - -    BARBITURATES SCREEN Negative - -    BENZODIAZEPINE SCREEN, URINE Negative - -    COCAINE SCREEN, URINE Negative - -    METHADONE SCREEN, URINE Negative - -                      Results from last 7 days  Lab Units 11/10/17  0203   CHOLESTEROL mg/dL 122   TRIGLYCERIDES mg/dL 59   HDL CHOL mg/dL 37*     ---------------------------------------------------------------------------------------------------------------------  Imaging Results (last 7 days)     Procedure Component Value Units Date/Time    XR Chest AP [[id number]] Collected:  11/09/17 0737     Updated:  11/09/17 0740    Narrative:       EXAMINATION: XR CHEST AP-      CLINICAL INDICATION:     CHF     TECHNIQUE:  XR CHEST AP-      COMPARISON: 10/10/2017      FINDINGS:   Improvement in bilateral airspace disease. Persistent opacities left  lung base.   Cardiomegaly is  noted.   No pneumothorax.   No pleural effusion.   No acute osseous findings.  Left-sided cardiac pacer.       Impression:       Overall slight improvement since the previous exam again  demonstrating probable changes of CHF with left basilar atelectasis.     This report was finalized on 11/9/2017 7:37 AM by Dr. Andres Guerra MD.             ---------------------------------------------------------------------------------------------------------------------  Assessment and Plan:  1. Acute systolic congestive heart failure and will plan on monitoring on telemetry monitoring electrolytes and diurese  2.   3. Chronic kidney disease  4.  5.    Myles Barone MD  11/12/17  10:31 AM

## 2017-11-12 NOTE — PROGRESS NOTES
"     LOS: 2 days     Chief Complaint:  Congestive heart failure    Subjective     Interval History:     She has increased swelling today as compared to yesterday.  She was up a lot yesterday.  She had a little bit of a nondescript chest discomfort yesterday with troponin of 0.04 that did not elevate.  Labs today show a white blood count of 4.8 with a hemoglobin of 9.7.  INR is 2.64.  Hemoglobin has dropped from 11.1-9.7 in the past 4 days.  She denies any black or bloody bowel movements.  Creatinine is stable at 2.01.  She does have an ejection fraction of 35-40%.      Objective     Vital Signs  /70 (BP Location: Right arm, Patient Position: Lying)  Pulse 80  Temp 97.6 °F (36.4 °C) (Oral)   Resp 18  Ht 67\" (170.2 cm)  Wt 207 lb 3.2 oz (94 kg)  SpO2 97%  BMI 32.45 kg/m2  Intake & Output (last day)       11/11 0701 - 11/12 0700 11/12 0701 - 11/13 0700    P.O. 500     Total Intake(mL/kg) 500 (5.3)     Urine (mL/kg/hr) 2000 (0.9)     Total Output 2000      Net -1500                    Physical Exam:     General Appearance:    Alert, cooperative, in no acute distress   Head:    Normocephalic, without obvious abnormality, atraumatic   Eyes:            Lids and lashes normal, conjunctivae and sclerae normal, no   icterus, no pallor, corneas clear, PERRLA   Ears:    Ears appear intact with no abnormalities noted       Neck:   No adenopathy, supple, trachea midline, no thyromegaly, no   carotid bruit, no JVD   Lungs:     Bibasilar crackles.  Symmetric chest wall expansion.      Heart:    Irreg Irreg rhythm and normal rate, normal S1 and S2, no            murmur, no gallop, no rub, no click   Chest Wall:    No abnormalities observed   Abdomen:     Normal bowel sounds, no masses, no organomegaly, soft        non-tender, non-distended, no guarding, no rebound                tenderness   Extremities:   Moves all extremities well, 1+ lower extremity edema, no cyanosis, no             redness   Pulses:   Pulses " palpable and equal bilaterally                    Results Review:    Lab Results   Component Value Date    WBC 4.83 11/12/2017    HGB 9.7 (L) 11/12/2017    HCT 30.9 (L) 11/12/2017    MCV 88.0 11/12/2017     11/12/2017       Lab Results   Component Value Date    GLUCOSE 110 11/12/2017    BUN 44 (H) 11/12/2017    CREATININE 2.01 (H) 11/12/2017    EGFRIFNONA 24 (L) 11/12/2017    BCR 21.9 11/12/2017     11/12/2017    K 4.0 11/12/2017     11/12/2017    CO2 31.2 11/12/2017    CALCIUM 8.5 11/12/2017    PROTENTOTREF 5.7 (L) 06/11/2017    ALBUMIN 3.70 10/09/2017    LABIL2 1.5 10/09/2017    AST 22 10/09/2017    ALT 19 10/09/2017     Lab Results   Component Value Date    INR 2.64 (H) 11/12/2017    INR 2.75 (H) 11/11/2017    INR 2.51 (H) 11/10/2017       No results found for: POCGLU       Medication Review:     Current Facility-Administered Medications:   •  aspirin EC tablet 81 mg, 81 mg, Oral, Daily, Ngozi De La Cruz MD, 81 mg at 11/12/17 0853  •  carvedilol (COREG) tablet 12.5 mg, 12.5 mg, Oral, Q12H, Nba Arora MD, 12.5 mg at 11/12/17 0853  •  citalopram (CeleXA) tablet 20 mg, 20 mg, Oral, Daily, Joe Watt MD, 20 mg at 11/12/17 0853  •  furosemide (LASIX) injection 40 mg, 40 mg, Intravenous, Daily, Nba Arora MD, 40 mg at 11/12/17 0853  •  gabapentin (NEURONTIN) capsule 400 mg, 400 mg, Oral, Nightly, Joe Watt MD, 400 mg at 11/11/17 2103  •  hydrALAZINE (APRESOLINE) tablet 100 mg, 100 mg, Oral, Q8H, Ngozi De La Cruz MD, 100 mg at 11/12/17 0528  •  HYDROcodone-acetaminophen (NORCO) 7.5-325 MG per tablet 1 tablet, 1 tablet, Oral, BID PRN, Joe Watt MD, 1 tablet at 11/12/17 0852  •  isosorbide mononitrate (IMDUR) 24 hr tablet 60 mg, 60 mg, Oral, Q24H, Ngozi De La Cruz MD, 60 mg at 11/12/17 0853  •  pantoprazole (PROTONIX) EC tablet 40 mg, 40 mg, Oral, QAM, Joe Watt MD, 40 mg at 11/12/17 0528  •  Pharmacy to dose warfarin, , Does not apply, Continuous  PRN, Joe Watt MD  •  sodium chloride (OCEAN) nasal spray 2 spray, 2 spray, Each Nare, PRN, Myles Barone MD  •  vitamin d (CHOLECALIFEROL) capsule 5,000 Units, 5,000 Units, Oral, Daily, Joe Watt MD, 5,000 Units at 11/12/17 08  •  warfarin (COUMADIN) tablet 6 mg, 6 mg, Oral, Daily, Joe Watt MD, 6 mg at 11/11/17 1591      Assessment/Plan     Acute on chronic systolic congestive heart failure  Moderate mitral regurgitation  Moderate to severe tricuspid regurgitation  Moderate pulmonary hypertension  Atrial fibrillation with controlled rate  Stage III chronic kidney disease  Hypertension  Hyperlipidemia      Lasix 40 mg IV every 24 hours.  Start spironolactone 25 mg daily and monitor renal function closely    Continue hydralazine for hypertensive management as well as for management of her heart failure    Warfarin 6 mg daily.  INR daily.  She is rate controlled    CBC and BMP tomorrow      Samuel Duane Kreis, MD  11/12/17  9:18 AM

## 2017-11-12 NOTE — PLAN OF CARE
Problem: Pain, Acute (Adult)  Goal: Acceptable Pain Control/Comfort Level  Outcome: Ongoing (interventions implemented as appropriate)    11/11/17 3719   Pain, Acute (Adult)   Acceptable Pain Control/Comfort Level making progress toward outcome

## 2017-11-12 NOTE — PLAN OF CARE
Problem: Pressure Ulcer Risk (Diogo Scale) (Adult,Obstetrics,Pediatric)  Goal: Skin Integrity  Outcome: Ongoing (interventions implemented as appropriate)    11/11/17 2137   Pressure Ulcer Risk (Diogo Scale) (Adult,Obstetrics,Pediatric)   Skin Integrity making progress toward outcome

## 2017-11-12 NOTE — PROGRESS NOTES
LOS: 2 days   Patient Care Team:  Myles Barone MD as PCP - General  Myles Barone MD as PCP - Family Medicine  Myles Barone MD as PCP - Claims Attributed  Kelli Márquez RN as Care Coordinator (Population Health)    Chief Complaint:Tre Kinney a 84 y.o. female presents predominantly with volume overload.  She is complaining of significant lower extremity edema and shortness of breath with exertion.  She has depressed LV systolic function with an estimated ejection fraction of 35-40% and a baseline creatinine of 2.       Interval History: She continues to be dyspneic and notes that she was relatively short of breath last night.      Objective   Vital Signs  Temp:  [97.5 °F (36.4 °C)-97.8 °F (36.6 °C)] 97.6 °F (36.4 °C)  Heart Rate:  [79-81] 80  Resp:  [18-20] 18  BP: (121-155)/(70-82) 124/70    Intake/Output Summary (Last 24 hours) at 11/12/17 1110  Last data filed at 11/12/17 0317   Gross per 24 hour   Intake              500 ml   Output             2000 ml   Net            -1500 ml       Comfortable NAD  PERRL, conjunctiva clear  Neck supple, no JVD or thyromegaly appreciated  S1/S2 RRR, no m/r/g  Lungs Are relatively clear to auscultation.  There is mildly decreased breath sounds in the bases.  Abdomen S/NT/ND (+) BS, no HSM appreciated  Extremities warm, no clubbing, cyanosis, there is 3+ edema noted.  Normal gait  No visible or palpable skin lesions  A/Ox4, mood and affect appropriate    Results Review:        Results from last 7 days  Lab Units 11/12/17  0026 11/10/17  0633 11/08/17  1529   SODIUM mmol/L 136 140 137   POTASSIUM mmol/L 4.0 3.8 4.2   CHLORIDE mmol/L 101 104 103   CO2 mmol/L 31.2 30.2 27.3   BUN mg/dL 44* 39* 47*   CREATININE mg/dL 2.01* 2.03* 2.28*   GLUCOSE mg/dL 110 93 105   CALCIUM mg/dL 8.5 8.8 9.1       Results from last 7 days  Lab Units 11/12/17  0657 11/12/17  0026 11/11/17  1844   CK TOTAL U/L  --   --  32   TROPONIN I ng/mL 0.043* 0.046* 0.040   CK MB  INDEX %  --   --  5.6*       Results from last 7 days  Lab Units 11/12/17  0026 11/08/17  1529   WBC 10*3/mm3 4.83 6.98   HEMOGLOBIN g/dL 9.7* 11.1*   HEMATOCRIT % 30.9* 34.0*   PLATELETS 10*3/mm3 172 189       Results from last 7 days  Lab Units 11/12/17  0026 11/11/17  0307 11/10/17  0203   INR  2.64* 2.75* 2.51*       Results from last 7 days  Lab Units 11/10/17  0203   CHOLESTEROL mg/dL 122           Results from last 7 days  Lab Units 11/10/17  0203   CHOLESTEROL mg/dL 122   TRIGLYCERIDES mg/dL 59   HDL CHOL mg/dL 37*       I reviewed the patient's new clinical results.  I personally viewed and interpreted the patient's EKG/Telemetry data        Medication Review:     aspirin 81 mg Oral Daily   carvedilol 12.5 mg Oral Q12H   citalopram 20 mg Oral Daily   furosemide 40 mg Intravenous Daily   gabapentin 400 mg Oral Nightly   hydrALAZINE 100 mg Oral Q8H   isosorbide mononitrate 60 mg Oral Q24H   pantoprazole 40 mg Oral QAM   polyethylene glycol 17 g Oral Daily   spironolactone 25 mg Oral Daily   vitamin d 5,000 Units Oral Daily   warfarin 6 mg Oral Daily         Pharmacy to dose warfarin        Active Problems:    CHF (congestive heart failure)    Congestive heart failure      Assessment/Plan   Volume overload with significant edema  Likely a combination of chronic systolic congestive heart failure, probable significant right heart failure and chronic kidney disease.  This is a relatively difficult clinical scenario to treat and that further diuresis will likely worsen her renal function.  However, on the other hand she is symptomatic at this point and likely needs further diuresis.  For now, given her increase in her baseline creatinine I will go ahead and consult nephrology.  I would like to consider augmenting her diuretic regimen with metolazone, however will wait for nephrology's input    David Nichols MD  11/12/17  11:10 AM

## 2017-11-13 LAB
ANION GAP SERPL CALCULATED.3IONS-SCNC: 3.7 MMOL/L (ref 3.6–11.2)
BASOPHILS # BLD AUTO: 0.03 10*3/MM3 (ref 0–0.3)
BASOPHILS NFR BLD AUTO: 0.7 % (ref 0–2)
BNP SERPL-MCNC: 1163 PG/ML (ref 0–100)
BUN BLD-MCNC: 44 MG/DL (ref 7–21)
BUN/CREAT SERPL: 21.7 (ref 7–25)
CALCIUM SPEC-SCNC: 8.6 MG/DL (ref 7.7–10)
CHLORIDE SERPL-SCNC: 101 MMOL/L (ref 99–112)
CO2 SERPL-SCNC: 31.3 MMOL/L (ref 24.3–31.9)
CREAT BLD-MCNC: 2.03 MG/DL (ref 0.43–1.29)
DEPRECATED RDW RBC AUTO: 49.7 FL (ref 37–54)
EOSINOPHIL # BLD AUTO: 0.2 10*3/MM3 (ref 0–0.7)
EOSINOPHIL NFR BLD AUTO: 4.9 % (ref 0–7)
ERYTHROCYTE [DISTWIDTH] IN BLOOD BY AUTOMATED COUNT: 15.8 % (ref 11.5–14.5)
GFR SERPL CREATININE-BSD FRML MDRD: 23 ML/MIN/1.73
GLUCOSE BLD-MCNC: 120 MG/DL (ref 70–110)
HCT VFR BLD AUTO: 31.8 % (ref 37–47)
HGB BLD-MCNC: 9.8 G/DL (ref 12–16)
IMM GRANULOCYTES # BLD: 0 10*3/MM3 (ref 0–0.03)
IMM GRANULOCYTES NFR BLD: 0 % (ref 0–0.5)
INR PPP: 2.77 (ref 0.9–1.1)
LYMPHOCYTES # BLD AUTO: 0.49 10*3/MM3 (ref 1–3)
LYMPHOCYTES NFR BLD AUTO: 12 % (ref 16–46)
MCH RBC QN AUTO: 27.1 PG (ref 27–33)
MCHC RBC AUTO-ENTMCNC: 30.8 G/DL (ref 33–37)
MCV RBC AUTO: 88.1 FL (ref 80–94)
MONOCYTES # BLD AUTO: 0.45 10*3/MM3 (ref 0.1–0.9)
MONOCYTES NFR BLD AUTO: 11 % (ref 0–12)
NEUTROPHILS # BLD AUTO: 2.91 10*3/MM3 (ref 1.4–6.5)
NEUTROPHILS NFR BLD AUTO: 71.4 % (ref 40–75)
OSMOLALITY SERPL CALC.SUM OF ELEC: 284.3 MOSM/KG (ref 273–305)
PLATELET # BLD AUTO: 165 10*3/MM3 (ref 130–400)
PMV BLD AUTO: 10.3 FL (ref 6–10)
POTASSIUM BLD-SCNC: 3.7 MMOL/L (ref 3.5–5.3)
PROTHROMBIN TIME: 29.6 SECONDS (ref 11–15.4)
RBC # BLD AUTO: 3.61 10*6/MM3 (ref 4.2–5.4)
SODIUM BLD-SCNC: 136 MMOL/L (ref 135–153)
WBC NRBC COR # BLD: 4.08 10*3/MM3 (ref 4.5–12.5)

## 2017-11-13 PROCEDURE — 94799 UNLISTED PULMONARY SVC/PX: CPT

## 2017-11-13 PROCEDURE — 83880 ASSAY OF NATRIURETIC PEPTIDE: CPT | Performed by: INTERNAL MEDICINE

## 2017-11-13 PROCEDURE — 99232 SBSQ HOSP IP/OBS MODERATE 35: CPT | Performed by: INTERNAL MEDICINE

## 2017-11-13 PROCEDURE — 25010000002 CHLOROTHIAZIDE PER 500 MG: Performed by: INTERNAL MEDICINE

## 2017-11-13 PROCEDURE — 85610 PROTHROMBIN TIME: CPT | Performed by: FAMILY MEDICINE

## 2017-11-13 PROCEDURE — 25010000002 FUROSEMIDE PER 20 MG: Performed by: INTERNAL MEDICINE

## 2017-11-13 PROCEDURE — 85025 COMPLETE CBC W/AUTO DIFF WBC: CPT | Performed by: FAMILY MEDICINE

## 2017-11-13 PROCEDURE — 80048 BASIC METABOLIC PNL TOTAL CA: CPT | Performed by: FAMILY MEDICINE

## 2017-11-13 RX ORDER — POTASSIUM CHLORIDE 20 MEQ/1
40 TABLET, EXTENDED RELEASE ORAL ONCE
Status: COMPLETED | OUTPATIENT
Start: 2017-11-13 | End: 2017-11-13

## 2017-11-13 RX ADMIN — ASPIRIN 81 MG: 81 TABLET ORAL at 09:42

## 2017-11-13 RX ADMIN — POTASSIUM CHLORIDE 40 MEQ: 1500 TABLET, EXTENDED RELEASE ORAL at 13:35

## 2017-11-13 RX ADMIN — HYDRALAZINE HYDROCHLORIDE 75 MG: 50 TABLET ORAL at 13:35

## 2017-11-13 RX ADMIN — CHLOROTHIAZIDE SODIUM 250 MG: 500 INJECTION, POWDER, LYOPHILIZED, FOR SOLUTION INTRAVENOUS at 21:41

## 2017-11-13 RX ADMIN — CITALOPRAM HYDROBROMIDE 20 MG: 20 TABLET ORAL at 09:42

## 2017-11-13 RX ADMIN — FUROSEMIDE 80 MG: 10 INJECTION, SOLUTION INTRAMUSCULAR; INTRAVENOUS at 13:42

## 2017-11-13 RX ADMIN — CARVEDILOL 12.5 MG: 6.25 TABLET, FILM COATED ORAL at 09:42

## 2017-11-13 RX ADMIN — WARFARIN SODIUM 6 MG: 3 TABLET ORAL at 17:18

## 2017-11-13 RX ADMIN — PANTOPRAZOLE SODIUM 40 MG: 40 TABLET, DELAYED RELEASE ORAL at 05:42

## 2017-11-13 RX ADMIN — POLYETHYLENE GLYCOL 3350 17 G: 17 POWDER, FOR SOLUTION ORAL at 09:42

## 2017-11-13 RX ADMIN — HYDRALAZINE HYDROCHLORIDE 75 MG: 50 TABLET ORAL at 22:01

## 2017-11-13 RX ADMIN — SPIRONOLACTONE 25 MG: 25 TABLET ORAL at 09:42

## 2017-11-13 RX ADMIN — ISOSORBIDE MONONITRATE 60 MG: 60 TABLET, EXTENDED RELEASE ORAL at 09:42

## 2017-11-13 RX ADMIN — FUROSEMIDE 40 MG: 10 INJECTION, SOLUTION INTRAMUSCULAR; INTRAVENOUS at 09:42

## 2017-11-13 RX ADMIN — HYDROCODONE BITARTRATE AND ACETAMINOPHEN 1 TABLET: 7.5; 325 TABLET ORAL at 21:58

## 2017-11-13 RX ADMIN — HYDRALAZINE HYDROCHLORIDE 50 MG: 50 TABLET, FILM COATED ORAL at 05:42

## 2017-11-13 RX ADMIN — GABAPENTIN 400 MG: 400 CAPSULE ORAL at 21:58

## 2017-11-13 RX ADMIN — CHOLECALCIFEROL CAP 125 MCG (5000 UNIT) 5000 UNITS: 125 CAP at 09:42

## 2017-11-13 RX ADMIN — CARVEDILOL 12.5 MG: 6.25 TABLET, FILM COATED ORAL at 21:53

## 2017-11-13 NOTE — PLAN OF CARE
Problem: Pressure Ulcer Risk (Diogo Scale) (Adult,Obstetrics,Pediatric)  Goal: Skin Integrity  Outcome: Ongoing (interventions implemented as appropriate)    11/13/17 0050   Pressure Ulcer Risk (Diogo Scale) (Adult,Obstetrics,Pediatric)   Skin Integrity making progress toward outcome

## 2017-11-13 NOTE — PLAN OF CARE
Problem: Fluid Volume Excess (Adult,Obstetrics,Pediatric)  Goal: Balanced Intake/Output  Outcome: Ongoing (interventions implemented as appropriate)    11/13/17 0051   Fluid Volume Excess (Adult,Obstetrics,Pediatric)   Balanced Intake/Output making progress toward outcome

## 2017-11-13 NOTE — CONSULTS
Nephrology Consult Note    Referring Provider: Dr Nichols  Reason for Consultation: CKD 4    Subjective       History of present illness:  Tre Kinney is a 84 y.o. female who presented to [facility] emergency department with chief complaint of worsening leg swelling and [id number] of breath on exertion which worsened 2 days prior to admission. She is being diuresed with lasix 40 mg iv daily which I increased to BID yesterday. She has felt some improvement in breathing but still has orthopnea. She has been compliant with low sodium diet but admits to drinking significant amount of fluids. She denies chest pain, Cough or palpitations. She denies nausea, vomiting or diarrhea. She has CKD 4 with baseline creatinine of 1.6-1.9. She was admitted with creatinine of 2.2  and is better at 2 today with lasix. 2 d echo revealed low LVEF and moderate pulmonary HTN  no Chronic NSAIDS use. Patient denies hematuria, dysuria, difficulty passing urine. No prior history of renal stones . No family history of renal disease        History  Past Medical History:   Diagnosis Date   • Arthritis    • CHF (congestive heart failure)    • Coronary artery disease    • H/O blood clots    • History of transfusion     no reaction    • Hypertension    , Past Surgical History:   Procedure Laterality Date   • ABDOMINAL SURGERY     • APPENDECTOMY     • CARDIAC SURGERY     •  SECTION     • CORONARY ANGIOPLASTY WITH STENT PLACEMENT     • PACEMAKER IMPLANTATION      left side    • SKIN BIOPSY     • TUBAL ABDOMINAL LIGATION     , Family History   Problem Relation Age of Onset   • Other Mother      cardiac disorder   • Hypertension Mother    • Heart disease Mother    • Thyroid disease Mother    • Other Father      cardiac disorder   • Heart disease Father    • Hypertension Father    • Other Sister      cardiac disorder   • Other Brother      cardiac disorder   • Other Other      cardiac disorder   • Other Other      cardiac disorder    , Social History   Substance Use Topics   • Smoking status: Never Smoker   • Smokeless tobacco: Not on file   • Alcohol use No   , Prescriptions Prior to Admission   Medication Sig Dispense Refill Last Dose   • aspirin 81 MG EC tablet Take 1 tablet by mouth Daily. 30 tablet 0 11/8/2017 at 0700   • Cholecalciferol (VITAMIN D3) 5000 units tablet tablet Take 5,000 Units by mouth Daily.   11/8/2017 at 0700   • citalopram (CELEXA) 20 MG tablet Take 20 mg by mouth Daily.   11/8/2017 at 0700   • furosemide (LASIX) 20 MG tablet Take 20 mg by mouth 2 (Two) Times a Day.   11/8/2017 at 0700   • gabapentin (NEURONTIN) 400 MG capsule Take 400 mg by mouth Every Night.   11/7/2017 at Unknown time   • hydrALAZINE (APRESOLINE) 100 MG tablet Take 1 tablet by mouth Every 8 (Eight) Hours. 90 tablet 0 11/8/2017 at 0700   • HYDROcodone-acetaminophen (NORCO) 7.5-325 MG per tablet Take 1 tablet by mouth 2 (Two) Times a Day As Needed for Moderate Pain .   11/7/2017 at Unknown time   • isosorbide mononitrate (IMDUR) 60 MG 24 hr tablet Take 60 mg by mouth Daily.   11/8/2017 at 0700   • metoprolol tartrate (LOPRESSOR) 50 MG tablet Take 50 mg by mouth 2 (Two) Times a Day.   11/8/2017 at 0700   • pantoprazole (PROTONIX) 40 MG EC tablet Take 40 mg by mouth Daily.   11/8/2017 at 0700   • warfarin (COUMADIN) 4 MG tablet Take 6 mg by mouth Daily.   11/8/2017 at 0700   , Scheduled Meds:    aspirin 81 mg Oral Daily   carvedilol 12.5 mg Oral Q12H   citalopram 20 mg Oral Daily   furosemide 40 mg Intravenous Q12H   gabapentin 400 mg Oral Nightly   hydrALAZINE 50 mg Oral Q8H   isosorbide mononitrate 60 mg Oral Q24H   pantoprazole 40 mg Oral QAM   polyethylene glycol 17 g Oral Daily   spironolactone 25 mg Oral Daily   vitamin d 5,000 Units Oral Daily   warfarin 6 mg Oral Daily   , Continuous Infusions:    Pharmacy to dose warfarin    , PRN Meds:  HYDROcodone-acetaminophen  •  Pharmacy to dose warfarin  •  sodium chloride and Allergies:  Latex    Review  of Systems  More than 10 point review if systems was done. Pertinent items are noted in HPI, all other systems reviewed and negative    Objective     Vital Signs  Temp:  [97.5 °F (36.4 °C)-98 °F (36.7 °C)] 97.8 °F (36.6 °C)  Heart Rate:  [77-92] 80  Resp:  [18] 18  BP: (120-153)/(70-81) 143/75    I/O this shift:  In: 80 [P.O.:80]  Out: 500 [Urine:500]  I/O last 3 completed shifts:  In: 860 [P.O.:860]  Out: 2300 [Urine:2300]    Physical Examination:    General Appearance : alert, appears stated age, cooperative and no distress  Head : normocephalic, without obvious abnormality and atraumatic  Eyes : conjunctivae and sclerae normal, no icterus, no pallor and PERRLA  Throat : oral mucosa moist  Neck: no adenopathy, suppple, no carotid bruit and no JVD  Lungs : reduced breath sounds at bases with expiratory wheezing  Heart : regular rhythm & normal rate, normal S1, S2, no murmur, no david, no rub   Abdomen :  normal bowel sounds, no masses and soft non-tender  Rectal : Deferred  Extremities : moves extremities well, no redness and 2+ edema  Pulses :  palpable and equal bilaterally  Skin : no bleeding, bruising or rash  Neurologic : orientated to person, place, time, grossly no focal deficitis    Laboratory Data :      WBC WBC   Date Value Ref Range Status   11/13/2017 4.08 (L) 4.50 - 12.50 10*3/mm3 Final   11/12/2017 4.83 4.50 - 12.50 10*3/mm3 Final      HGB Hemoglobin   Date Value Ref Range Status   11/13/2017 9.8 (L) 12.0 - 16.0 g/dL Final   11/12/2017 9.7 (L) 12.0 - 16.0 g/dL Final      HCT Hematocrit   Date Value Ref Range Status   11/13/2017 31.8 (L) 37.0 - 47.0 % Final   11/12/2017 30.9 (L) 37.0 - 47.0 % Final      Platlets No results found for: LABPLAT   MCV MCV   Date Value Ref Range Status   11/13/2017 88.1 80.0 - 94.0 fL Final   11/12/2017 88.0 80.0 - 94.0 fL Final          Sodium Sodium   Date Value Ref Range Status   11/13/2017 136 135 - 153 mmol/L Final   11/12/2017 136 135 - 153 mmol/L Final   11/10/2017  140 135 - 153 mmol/L Final      Potassium Potassium   Date Value Ref Range Status   11/13/2017 3.7 3.5 - 5.3 mmol/L Final   11/12/2017 4.0 3.5 - 5.3 mmol/L Final   11/10/2017 3.8 3.5 - 5.3 mmol/L Final      Chloride Chloride   Date Value Ref Range Status   11/13/2017 101 99 - 112 mmol/L Final   11/12/2017 101 99 - 112 mmol/L Final   11/10/2017 104 99 - 112 mmol/L Final      CO2 CO2   Date Value Ref Range Status   11/13/2017 31.3 24.3 - 31.9 mmol/L Final   11/12/2017 31.2 24.3 - 31.9 mmol/L Final   11/10/2017 30.2 24.3 - 31.9 mmol/L Final      BUN BUN   Date Value Ref Range Status   11/13/2017 44 (H) 7 - 21 mg/dL Final   11/12/2017 44 (H) 7 - 21 mg/dL Final   11/10/2017 39 (H) 7 - 21 mg/dL Final      Creatinine Creatinine   Date Value Ref Range Status   11/13/2017 2.03 (H) 0.43 - 1.29 mg/dL Final   11/12/2017 2.01 (H) 0.43 - 1.29 mg/dL Final   11/10/2017 2.03 (H) 0.43 - 1.29 mg/dL Final      Calcium Calcium   Date Value Ref Range Status   11/13/2017 8.6 7.7 - 10.0 mg/dL Final   11/12/2017 8.5 7.7 - 10.0 mg/dL Final   11/10/2017 8.8 7.7 - 10.0 mg/dL Final      PO4 No results found for: CAPO4   Albumin No results found for: ALBUMIN   Magnesium No results found for: MG   Uric Acid No results found for: URICACID     Radiology results :     Imaging Results (last 72 hours)     ** No results found for the last 72 hours. **            Medications:        aspirin 81 mg Oral Daily   carvedilol 12.5 mg Oral Q12H   citalopram 20 mg Oral Daily   furosemide 40 mg Intravenous Q12H   gabapentin 400 mg Oral Nightly   hydrALAZINE 50 mg Oral Q8H   isosorbide mononitrate 60 mg Oral Q24H   pantoprazole 40 mg Oral QAM   polyethylene glycol 17 g Oral Daily   spironolactone 25 mg Oral Daily   vitamin d 5,000 Units Oral Daily   warfarin 6 mg Oral Daily       Pharmacy to dose warfarin        Assessment/Plan     Active Problems:    CHF (congestive heart failure)    Congestive heart failure      1. 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 She is in volume overload. I will give lasix drip 80 mg with diuril 250 mg iv and monitor renal function closely. Avoid nephrotoxins    2. Systolic CHF exacerbation : I have counseled her on fluids, will start fluid restriction 1.2 L/day    3. HTN : reduce hydralalzine to allow -150s/80s for renal perfusion    4. CAD    Thanks Dr Nichols for the consult. We will follow with you.  I discussed the patients findings and my recommendations with patient and nursing staff    Sebas Ellis MD  11/13/17  6:05 AM

## 2017-11-13 NOTE — PROGRESS NOTES
LOS: 3 days   Patient Care Team:  Myles Barone MD as PCP - General  Myles Barone MD as PCP - Family Medicine  Myles Barone MD as PCP - Claims Attributed  Kelli Márquez RN as Care Coordinator (Population Health)      Subjective     Admission information:    84-year-old woman with history of arteriosclerotic CAD status post stent done about 3 years ago-details not known and ischemic cardiomyopathy with ejection fraction 35-40% per echocardiogram done 7/2017 and repeated hospitalization for CHF has been admitted with chief complaint of increasing shortness of breath upon exertion which has been progressive and now gets this symptom on minimal exertion associated with progressive bilateral leg edema.  She is taking furosemide 40 mg by mouth daily at home with no improvement in her symptoms.  Associated with generalized weakness and easy fatigability.  Her BNP level was more than 1500 and chest x-ray showed left-sided pleural effusion and possible bypass graft consolidation.  Patient has chronic kidney disease with creatinine of 2.2.  Patient was given furosemide 40 mg IV ×1.     She denied history of chest pain or angina.  No history of dizziness, palpitation or syncope.     Her cardiovascular history is remarkable for arteriosclerotic CAD status post stent in 2014-details not known, chronic systolic CHF, ischemic cardiomyopathy with EF 35-40%, atrial fibrillation, long-term anticoagulation with Coumadin, sick sinus syndrome and status post pacemaker implantation.     Cardiac risk factors-hypertension, hyperlipidemia and family history of heart disease.  No history of smoking.     She has chronic kidney disease with creatinine of 2.2.    Interval History:     The patient states she still present in with shortness of breath.  She is still undergoing diuresis to improve her volume status.  She has mild bilateral lower extremity edema but this appears improved from previous.    History taken  from: patient chart    Vital Signs  Temp:  [97.7 °F (36.5 °C)-98 °F (36.7 °C)] 97.8 °F (36.6 °C)  Heart Rate:  [77-84] 80  Resp:  [18-20] 20  BP: (120-157)/(75-81) 157/79    Physical Exam:     Physical Exam   Constitutional: She is oriented to person, place, and time. She appears well-developed and well-nourished.   Elderly and obese white female sitting comfortably on bed.   HENT:   Mouth/Throat: Oropharynx is clear and moist.   Eyes: EOM are normal. Pupils are equal, round, and reactive to light.   Neck: Neck supple. No JVD present. No tracheal deviation present. No thyromegaly present.   Cardiovascular: Normal rate, regular rhythm, S1 normal and S2 normal.  Exam reveals no gallop and no friction rub.    Murmur heard.   Harsh midsystolic murmur is present with a grade of 2/6  at the upper right sternal border radiating to the neck  Pulmonary/Chest: Effort normal. No respiratory distress. She has no wheezes. She has rales.   Abdominal: Soft. Bowel sounds are normal. She exhibits no mass. There is no tenderness.   Musculoskeletal: Normal range of motion. She exhibits edema (Mild bilateral lower extremity edema.).   Lymphadenopathy:     She has no cervical adenopathy.   Neurological: She is alert and oriented to person, place, and time.   Skin: Skin is warm and dry. No rash noted.   Psychiatric: She has a normal mood and affect.       Results Review:    I reviewed the patient's new clinical results.  I reviewed the patient's new imaging results and agree with the interpretation.  I reviewed the patient's other test results and agree with the interpretation  I personally viewed and interpreted the patient's EKG/Telemetry data    Medication:  Scheduled Meds:    aspirin 81 mg Oral Daily   carvedilol 12.5 mg Oral Q12H   furosemide (LASIX) in NS IVPB 80 mg Intravenous Once   Followed by      chlorothiazide (DIURIL) IVPB 250 mg Intravenous Once   citalopram 20 mg Oral Daily   gabapentin 400 mg Oral Nightly   hydrALAZINE 50  mg Oral Q8H   isosorbide mononitrate 60 mg Oral Q24H   pantoprazole 40 mg Oral QAM   polyethylene glycol 17 g Oral Daily   spironolactone 25 mg Oral Daily   vitamin d 5,000 Units Oral Daily   warfarin 6 mg Oral Daily     Continuous Infusions:    Pharmacy to dose warfarin      PRN Meds:.HYDROcodone-acetaminophen  •  Pharmacy to dose warfarin  •  sodium chloride    Telemetry:Atrial fibrillation with paced rhythm in the 80s.      Assessment/Plan     1.  Congestive heart failure:  patient with congestive heart failure with an ejection fraction of 35-40% on last echocardiogram.  She was admitted due to volume overload which is likely related to heart failure exacerbation as well as renal failure.  She is currently undergoing diuresis.  She is on metoprolol and BiDil equivalent.    2.  Coronary artery disease: Patient with history of coronary artery disease currently on aspirin, beta blocker and isosorbide mononitrate.  She is not on a statin due to intolerance.   She reports no episodes of chest pain at this point.  We'll continue current regimen.     3.  Dyslipidemia: Patient with history of dyslipidemia for which she is currently on no medication due to statin intolerance.      4.  Hypertension: Patient with a history of hypertension that appears to be well-controlled on current regimen.     5.  Shortness of breath: Patient with history of shortness of breath but appears to be a combination of heart failure and pulmonary lung disease.     6.   Atrial fibrillation: Patient with a history of atrial fibrillation which is currently well rate controlled on metoprolol.  She is on Coumadin for stroke prevention.    Plan:    1.  Congestive heart failure: The patient has been started on Lasix infusion by nephrology.  Her renal function remains abnormal but stable.  Will check a BNP.    2.  Coronary artery disease: At this point we will continue current regimen.  She has no evidence of an acute coronary event.    3.   Dyslipidemia: The patient's lipid panel shows adequate lipid control on no medication.  She is currently intolerant to statins.  At this point would continue to monitor.    4.  Hypertension: The patient's blood pressure appears to be uncontrolled on current regimen.  At this point we will increase hydralazine to 75 mg by mouth 3 times a day.    5.  Atrial fibrillation: At this point continue beta blocker for rate control.  The patient is currently on Coumadin with therapeutic INR.  Would continue current dose.    Nba Arora MD  11/13/17  11:13 AM    Dragon disclaimer:  Much of this encounter note is an electronic transcription/translation of spoken language to printed text. The electronic translation of spoken language may permit erroneous, or at times, nonsensical words or phrases to be inadvertently transcribed; Although I have reviewed the note for such errors, some may still exist.

## 2017-11-13 NOTE — PROGRESS NOTES
Discharge Planning Assessment   [place]     Patient Name: Tre Kinney  MRN: [id number]  Today's Date: 11/13/2017    Admit Date: 11/8/2017          Discharge Needs Assessment     None            Discharge Plan       11/13/17 1444    Case Management/Social Work Plan    Plan Pt admitted on 11/08/17.  Pt lives at home with son Ken and plans to return home at discharge.  Pt currently does not utilize home health services.  Pt currently utilizes walker, bedside commode, shower chair, nebulizer and home 02 via [facility].  SS will follow and assist with discharge needs.     Patient/Family In Agreement With Plan yes        Discharge Placement     No information found                Demographic Summary     None            Functional Status     None            Psychosocial     None            Abuse/Neglect     None            Legal     None            Substance Abuse     None            Patient Forms     None          Mary Salgado

## 2017-11-13 NOTE — PLAN OF CARE
Problem: Pain, Acute (Adult)  Goal: Acceptable Pain Control/Comfort Level  Outcome: Ongoing (interventions implemented as appropriate)    11/13/17 0050   Pain, Acute (Adult)   Acceptable Pain Control/Comfort Level making progress toward outcome

## 2017-11-14 LAB
ANION GAP SERPL CALCULATED.3IONS-SCNC: 5.8 MMOL/L (ref 3.6–11.2)
BUN BLD-MCNC: 43 MG/DL (ref 7–21)
BUN/CREAT SERPL: 20.7 (ref 7–25)
CALCIUM SPEC-SCNC: 9.2 MG/DL (ref 7.7–10)
CHLORIDE SERPL-SCNC: 99 MMOL/L (ref 99–112)
CO2 SERPL-SCNC: 33.2 MMOL/L (ref 24.3–31.9)
CREAT BLD-MCNC: 2.08 MG/DL (ref 0.43–1.29)
GFR SERPL CREATININE-BSD FRML MDRD: 23 ML/MIN/1.73
GLUCOSE BLD-MCNC: 95 MG/DL (ref 70–110)
INR PPP: 3.05 (ref 0.9–1.1)
OSMOLALITY SERPL CALC.SUM OF ELEC: 286.3 MOSM/KG (ref 273–305)
POTASSIUM BLD-SCNC: 3.9 MMOL/L (ref 3.5–5.3)
PROTHROMBIN TIME: 32 SECONDS (ref 11–15.4)
SODIUM BLD-SCNC: 138 MMOL/L (ref 135–153)

## 2017-11-14 PROCEDURE — 25010000002 CHLOROTHIAZIDE PER 500 MG: Performed by: INTERNAL MEDICINE

## 2017-11-14 PROCEDURE — 94799 UNLISTED PULMONARY SVC/PX: CPT

## 2017-11-14 PROCEDURE — 99232 SBSQ HOSP IP/OBS MODERATE 35: CPT | Performed by: INTERNAL MEDICINE

## 2017-11-14 PROCEDURE — 85610 PROTHROMBIN TIME: CPT | Performed by: FAMILY MEDICINE

## 2017-11-14 PROCEDURE — 25010000002 FUROSEMIDE PER 20 MG: Performed by: INTERNAL MEDICINE

## 2017-11-14 PROCEDURE — 80048 BASIC METABOLIC PNL TOTAL CA: CPT | Performed by: INTERNAL MEDICINE

## 2017-11-14 RX ORDER — WARFARIN SODIUM 5 MG/1
5 TABLET ORAL
Status: DISCONTINUED | OUTPATIENT
Start: 2017-11-14 | End: 2017-11-17 | Stop reason: HOSPADM

## 2017-11-14 RX ORDER — HYDRALAZINE HYDROCHLORIDE 50 MG/1
50 TABLET, FILM COATED ORAL EVERY 8 HOURS SCHEDULED
Status: DISCONTINUED | OUTPATIENT
Start: 2017-11-14 | End: 2017-11-14

## 2017-11-14 RX ADMIN — FUROSEMIDE 80 MG: 10 INJECTION, SOLUTION INTRAMUSCULAR; INTRAVENOUS at 15:05

## 2017-11-14 RX ADMIN — POLYETHYLENE GLYCOL 3350 17 G: 17 POWDER, FOR SOLUTION ORAL at 09:41

## 2017-11-14 RX ADMIN — CARVEDILOL 12.5 MG: 6.25 TABLET, FILM COATED ORAL at 09:41

## 2017-11-14 RX ADMIN — CHLOROTHIAZIDE SODIUM 250 MG: 500 INJECTION, POWDER, LYOPHILIZED, FOR SOLUTION INTRAVENOUS at 21:17

## 2017-11-14 RX ADMIN — GABAPENTIN 400 MG: 400 CAPSULE ORAL at 21:15

## 2017-11-14 RX ADMIN — CARVEDILOL 12.5 MG: 6.25 TABLET, FILM COATED ORAL at 21:16

## 2017-11-14 RX ADMIN — SPIRONOLACTONE 25 MG: 25 TABLET ORAL at 09:41

## 2017-11-14 RX ADMIN — PANTOPRAZOLE SODIUM 40 MG: 40 TABLET, DELAYED RELEASE ORAL at 06:02

## 2017-11-14 RX ADMIN — WARFARIN SODIUM 5 MG: 5 TABLET ORAL at 17:05

## 2017-11-14 RX ADMIN — CHOLECALCIFEROL CAP 125 MCG (5000 UNIT) 5000 UNITS: 125 CAP at 09:41

## 2017-11-14 RX ADMIN — HYDRALAZINE HYDROCHLORIDE 75 MG: 50 TABLET ORAL at 13:34

## 2017-11-14 RX ADMIN — CITALOPRAM HYDROBROMIDE 20 MG: 20 TABLET ORAL at 09:41

## 2017-11-14 RX ADMIN — ASPIRIN 81 MG: 81 TABLET ORAL at 09:41

## 2017-11-14 RX ADMIN — ISOSORBIDE MONONITRATE 60 MG: 60 TABLET, EXTENDED RELEASE ORAL at 09:41

## 2017-11-14 RX ADMIN — HYDROCODONE BITARTRATE AND ACETAMINOPHEN 1 TABLET: 7.5; 325 TABLET ORAL at 21:15

## 2017-11-14 NOTE — PLAN OF CARE
Problem: Fluid Volume Excess (Adult,Obstetrics,Pediatric)  Goal: Identify Related Risk Factors and Signs and Symptoms  Outcome: Ongoing (interventions implemented as appropriate)  Goal: Stable Weight  Outcome: Ongoing (interventions implemented as appropriate)  Goal: Balanced Intake/Output  Outcome: Ongoing (interventions implemented as appropriate)    Problem: Pain, Acute (Adult)  Goal: Identify Related Risk Factors and Signs and Symptoms  Outcome: Ongoing (interventions implemented as appropriate)  Goal: Acceptable Pain Control/Comfort Level  Outcome: Ongoing (interventions implemented as appropriate)    Problem: Pressure Ulcer Risk (Diogo Scale) (Adult,Obstetrics,Pediatric)  Goal: Identify Related Risk Factors and Signs and Symptoms  Outcome: Ongoing (interventions implemented as appropriate)  Goal: Skin Integrity  Outcome: Ongoing (interventions implemented as appropriate)

## 2017-11-14 NOTE — PROGRESS NOTES
LOS: 4 days   Patient Care Team:  Myles Barone MD as PCP - General  Myles Barone MD as PCP - Family Medicine  Myles Barone MD as PCP - Claims Attributed  Kelli Márquez RN as Care Coordinator (Population Health)      Subjective     Admission information:    84-year-old woman with history of arteriosclerotic CAD status post stent done about 3 years ago-details not known and ischemic cardiomyopathy with ejection fraction 35-40% per echocardiogram done 7/2017 and repeated hospitalization for CHF has been admitted with chief complaint of increasing shortness of breath upon exertion which has been progressive and now gets this symptom on minimal exertion associated with progressive bilateral leg edema.  She is taking furosemide 40 mg by mouth daily at home with no improvement in her symptoms.  Associated with generalized weakness and easy fatigability.  Her BNP level was more than 1500 and chest x-ray showed left-sided pleural effusion and possible bypass graft consolidation.  Patient has chronic kidney disease with creatinine of 2.2.  Patient was given furosemide 40 mg IV ×1.     She denied history of chest pain or angina.  No history of dizziness, palpitation or syncope.     Her cardiovascular history is remarkable for arteriosclerotic CAD status post stent in 2014-details not known, chronic systolic CHF, ischemic cardiomyopathy with EF 35-40%, atrial fibrillation, long-term anticoagulation with Coumadin, sick sinus syndrome and status post pacemaker implantation.     Cardiac risk factors-hypertension, hyperlipidemia and family history of heart disease.  No history of smoking.     She has chronic kidney disease with creatinine of 2.2.    Interval History:     According to the patient she is doing better today.  She denies any chest pain, shortness of breath or palpitations.  She states her breathing is significantly improved.    History taken from: patient chart    Vital Signs  Temp:  [97.8  °F (36.6 °C)-98.2 °F (36.8 °C)] 98.1 °F (36.7 °C)  Heart Rate:  [72-89] 80  Resp:  [20] 20  BP: (118-171)/(64-89) 118/72    Physical Exam:     Physical Exam   Constitutional: She is oriented to person, place, and time. She appears well-developed and well-nourished.   Elderly and obese white female sitting comfortably on bed.   HENT:   Mouth/Throat: Oropharynx is clear and moist.   Eyes: EOM are normal. Pupils are equal, round, and reactive to light.   Neck: Neck supple. No JVD present. No tracheal deviation present. No thyromegaly present.   Cardiovascular: Normal rate, regular rhythm, S1 normal and S2 normal.  Exam reveals no gallop and no friction rub.    Murmur heard.   Harsh midsystolic murmur is present with a grade of 2/6  at the upper right sternal border radiating to the neck  Pulmonary/Chest: Effort normal. No respiratory distress. She has no wheezes. She has rales.   Abdominal: Soft. Bowel sounds are normal. She exhibits no mass. There is no tenderness.   Musculoskeletal: Normal range of motion. She exhibits edema (Mild bilateral lower extremity edema.).   Lymphadenopathy:     She has no cervical adenopathy.   Neurological: She is alert and oriented to person, place, and time.   Skin: Skin is warm and dry. No rash noted.   Psychiatric: She has a normal mood and affect.       Results Review:    I reviewed the patient's new clinical results.  I reviewed the patient's new imaging results and agree with the interpretation.  I reviewed the patient's other test results and agree with the interpretation  I personally viewed and interpreted the patient's EKG/Telemetry data    Medication:  Scheduled Meds:    aspirin 81 mg Oral Daily   carvedilol 12.5 mg Oral Q12H   citalopram 20 mg Oral Daily   gabapentin 400 mg Oral Nightly   hydrALAZINE 75 mg Oral Q8H   isosorbide mononitrate 60 mg Oral Q24H   pantoprazole 40 mg Oral QAM   polyethylene glycol 17 g Oral Daily   spironolactone 25 mg Oral Daily   vitamin d 5,000  Units Oral Daily   warfarin 6 mg Oral Daily     Continuous Infusions:    Pharmacy to dose warfarin      PRN Meds:.HYDROcodone-acetaminophen  •  Pharmacy to dose warfarin  •  sodium chloride    Telemetry:Atrial fibrillation with paced rhythm in the 80s.      Assessment/Plan     1.  Congestive heart failure:  patient with congestive heart failure with an ejection fraction of 35-40% on last echocardiogram.  She was admitted due to volume overload which is likely related to heart failure exacerbation as well as renal failure.  She is currently undergoing diuresis.  She is on metoprolol and BiDil equivalent.    2.  Coronary artery disease: Patient with history of coronary artery disease currently on aspirin, beta blocker and isosorbide mononitrate.  She is not on a statin due to intolerance.   She reports no episodes of chest pain at this point.  We'll continue current regimen.     3.  Dyslipidemia: Patient with history of dyslipidemia for which she is currently on no medication due to statin intolerance.      4.  Hypertension: Patient with a history of hypertension that appears to be well-controlled on current regimen.     5.  Shortness of breath: Patient with history of shortness of breath but appears to be a combination of heart failure and pulmonary lung disease.     6.   Atrial fibrillation: Patient with a history of atrial fibrillation which is currently well rate controlled on metoprolol.  She is on Coumadin for stroke prevention.    Plan:    1.  Congestive heart failure: The patient's BNP appears to be improving.  Diuresis being controlled by nephrology.  Her renal function appears to have been stable but today's evaluation is still pending.    2.  Coronary artery disease: At this point we will continue current regimen.  She has no evidence of an acute coronary event.    4.  Hypertension: The patient's blood pressure appears to be well-controlled on current regimen.  At this point would continue.    5.  Atrial  fibrillation: At this point continue beta blocker for rate control.  Her INR is supratherapeutic today.  This being controlled by pharmacy.    Nba Arora MD  11/14/17  9:31 AM    Dragon disclaimer:  Much of this encounter note is an electronic transcription/translation of spoken language to printed text. The electronic translation of spoken language may permit erroneous, or at times, nonsensical words or phrases to be inadvertently transcribed; Although I have reviewed the note for such errors, some may still exist.

## 2017-11-14 NOTE — PROGRESS NOTES
Tre Kinney 84 y.o.   11/14/17    Subjective: Feeling well in the light of mercury  Objective: STABLE CHEST BILATERAL CRACKLES EXTREMITIES +1 EDEMA  Vital Signs (last 72 hrs)       11/10 0700  -  11/11 0659 11/11 0700  -  11/12 0659 11/12 0700  -  11/13 0659 11/13 0700  -  11/14 0622   Most Recent    Temp (°F) 97.5 -  98.7    97.5 -  97.8    97.5 -  98    97.8 -  98.2     98.1 (36.7)    Heart Rate 68 -  83    79 -  81    77 -  92    72 -  89     80    Resp 18 -  20    18 -  20    18 -  20      20     20    /50 -  147/73    121/82 -  155/72    120/79 -  153/81    118/72 -  171/89     118/72    SpO2 (%) 93 -  98    92 -  97    94 -  97    96 -  99     96        Lab Results (last 72 hours)     Procedure Component Value Units Date/Time    CK [[id number]]  (Normal) Collected:  11/11/17 1844    Specimen:  Blood Updated:  11/11/17 1921     Creatine Kinase 32 U/L     Troponin [[id number]]  (Normal) Collected:  11/11/17 1844    Specimen:  Blood Updated:  11/11/17 1924     Troponin I 0.040 ng/mL     Narrative:       Ultra Troponin I Reference Range:         <=0.039 ng/mL: Negative    0.04-0.779 ng/mL: Indeterminate Range. Suspicious of MI.  Clinical correlation required.       >=0.78  ng/mL: Consistent with myocardial injury.  Clinical correlation required.    CK-MB [[id number]]  (Normal) Collected:  11/11/17 1844    Specimen:  Blood Updated:  11/11/17 1924     CKMB 1.79 ng/mL     CK-MB Index [[id number]]  (Abnormal) Collected:  11/11/17 1844    Specimen:  Blood Updated:  11/11/17 1924     CK-MB Index 5.6 (H) %     CBC & Differential [[id number]] Collected:  11/12/17 0026    Specimen:  Blood Updated:  11/12/17 0046    Narrative:       The following orders were created for panel order CBC & Differential.  Procedure                               Abnormality         Status                     ---------                               -----------         ------                     CBC Auto Differential[[id number]]        Abnormal             Final result                 Please view results for these tests on the individual orders.    CBC Auto Differential [[id number]]  (Abnormal) Collected:  11/12/17 0026    Specimen:  Blood Updated:  11/12/17 0046     WBC 4.83 10*3/mm3      RBC 3.51 (L) 10*6/mm3      Hemoglobin 9.7 (L) g/dL      Hematocrit 30.9 (L) %      MCV 88.0 fL      MCH 27.6 pg      MCHC 31.4 (L) g/dL      RDW 15.9 (H) %      RDW-SD 50.0 fl      MPV 9.6 fL      Platelets 172 10*3/mm3      Neutrophil % 71.3 %      Lymphocyte % 14.7 (L) %      Monocyte % 9.3 %      Eosinophil % 4.1 %      Basophil % 0.6 %      Immature Grans % 0.0 %      Neutrophils, Absolute 3.44 10*3/mm3      Lymphocytes, Absolute 0.71 (L) 10*3/mm3      Monocytes, Absolute 0.45 10*3/mm3      Eosinophils, Absolute 0.20 10*3/mm3      Basophils, Absolute 0.03 10*3/mm3      Immature Grans, Absolute 0.00 10*3/mm3     Protime-INR [[id number]]  (Abnormal) Collected:  11/12/17 0026    Specimen:  Blood Updated:  11/12/17 0054     Protime 28.6 (H) Seconds       Note new Reference Range        INR 2.64 (H)    Narrative:       Suggested INR therapeutic range for stable oral anticoagulant therapy:    Low Intensity therapy:   1.5-2.0  Moderate Intensity therapy:   2.0-3.0  High Intensity therapy:   2.5-4.0    Basic Metabolic Panel [[id number]]  (Abnormal) Collected:  11/12/17 0026    Specimen:  Blood Updated:  11/12/17 0105     Glucose 110 mg/dL      BUN 44 (H) mg/dL      Creatinine 2.01 (H) mg/dL      Sodium 136 mmol/L      Potassium 4.0 mmol/L      Chloride 101 mmol/L      CO2 31.2 mmol/L      Calcium 8.5 mg/dL      eGFR Non African Amer 24 (L) mL/min/1.73      BUN/Creatinine Ratio 21.9     Anion Gap 3.8 mmol/L     Narrative:       The MDRD GFR formula is only valid for adults with stable renal function between ages 18 and 70.    Osmolality, Calculated [[id number]]  (Normal) Collected:  11/12/17 0026    Specimen:  Blood Updated:  11/12/17 0105     Osmolality Calc 283.8 mOsm/kg      Troponin [[id number]]  (Abnormal) Collected:  11/12/17 0026    Specimen:  Blood Updated:  11/12/17 0114     Troponin I 0.046 (H) ng/mL     Narrative:       Ultra Troponin I Reference Range:         <=0.039 ng/mL: Negative    0.04-0.779 ng/mL: Indeterminate Range. Suspicious of MI.  Clinical correlation required.       >=0.78  ng/mL: Consistent with myocardial injury.  Clinical correlation required.    Troponin [[id number]]  (Abnormal) Collected:  11/12/17 0657    Specimen:  Blood Updated:  11/12/17 0754     Troponin I 0.043 (H) ng/mL     Narrative:       Ultra Troponin I Reference Range:         <=0.039 ng/mL: Negative    0.04-0.779 ng/mL: Indeterminate Range. Suspicious of MI.  Clinical correlation required.       >=0.78  ng/mL: Consistent with myocardial injury.  Clinical correlation required.    CBC & Differential [[id number]] Collected:  11/13/17 0022    Specimen:  Blood Updated:  11/13/17 0141    Narrative:       The following orders were created for panel order CBC & Differential.  Procedure                               Abnormality         Status                     ---------                               -----------         ------                     CBC Auto Differential[[id number]]        Abnormal            Final result                 Please view results for these tests on the individual orders.    CBC Auto Differential [[id number]]  (Abnormal) Collected:  11/13/17 0022    Specimen:  Blood Updated:  11/13/17 0141     WBC 4.08 (L) 10*3/mm3      RBC 3.61 (L) 10*6/mm3      Hemoglobin 9.8 (L) g/dL      Hematocrit 31.8 (L) %      MCV 88.1 fL      MCH 27.1 pg      MCHC 30.8 (L) g/dL      RDW 15.8 (H) %      RDW-SD 49.7 fl      MPV 10.3 (H) fL      Platelets 165 10*3/mm3      Neutrophil % 71.4 %      Lymphocyte % 12.0 (L) %      Monocyte % 11.0 %      Eosinophil % 4.9 %      Basophil % 0.7 %      Immature Grans % 0.0 %      Neutrophils, Absolute 2.91 10*3/mm3      Lymphocytes, Absolute 0.49 (L) 10*3/mm3       Monocytes, Absolute 0.45 10*3/mm3      Eosinophils, Absolute 0.20 10*3/mm3      Basophils, Absolute 0.03 10*3/mm3      Immature Grans, Absolute 0.00 10*3/mm3     Protime-INR [[id number]]  (Abnormal) Collected:  11/13/17 0022    Specimen:  Blood Updated:  11/13/17 0148     Protime 29.6 (H) Seconds       Note new Reference Range        INR 2.77 (H)    Narrative:       Suggested INR therapeutic range for stable oral anticoagulant therapy:    Low Intensity therapy:   1.5-2.0  Moderate Intensity therapy:   2.0-3.0  High Intensity therapy:   2.5-4.0    Basic Metabolic Panel [[id number]]  (Abnormal) Collected:  11/13/17 0022    Specimen:  Blood Updated:  11/13/17 0202     Glucose 120 (H) mg/dL      BUN 44 (H) mg/dL      Creatinine 2.03 (H) mg/dL      Sodium 136 mmol/L      Potassium 3.7 mmol/L      Chloride 101 mmol/L      CO2 31.3 mmol/L      Calcium 8.6 mg/dL      eGFR Non African Amer 23 (L) mL/min/1.73      BUN/Creatinine Ratio 21.7     Anion Gap 3.7 mmol/L     Narrative:       The MDRD GFR formula is only valid for adults with stable renal function between ages 18 and 70.    Osmolality, Calculated [[id number]]  (Normal) Collected:  11/13/17 0022    Specimen:  Blood Updated:  11/13/17 0202     Osmolality Calc 284.3 mOsm/kg     BNP [[id number]]  (Abnormal) Collected:  11/13/17 1122    Specimen:  Blood Updated:  11/13/17 1213     BNP 1163.0 (H) pg/mL     Protime-INR [[id number]]  (Abnormal) Collected:  11/14/17 0036    Specimen:  Blood Updated:  11/14/17 0128     Protime 32.0 (H) Seconds       Note new Reference Range        INR 3.05 (H)    Narrative:       Suggested INR therapeutic range for stable oral anticoagulant therapy:    Low Intensity therapy:   1.5-2.0  Moderate Intensity therapy:   2.0-3.0  High Intensity therapy:   2.5-4.0        Imaging Results (last 24 hours)     ** No results found for the last 24 hours. **        Assessment:Active Problems:    CHF (congestive heart failure)    Congestive heart  failure  CAD  Plan: Continue diuresis and monitoring renal function/ambulate

## 2017-11-14 NOTE — PLAN OF CARE
Problem: Pressure Ulcer Risk (Diogo Scale) (Adult,Obstetrics,Pediatric)  Goal: Identify Related Risk Factors and Signs and Symptoms  Outcome: Ongoing (interventions implemented as appropriate)

## 2017-11-14 NOTE — PROGRESS NOTES
Nephrology  Note        Subjective       Diuresed well. Breathing and edema some better. Appears comfortable.     Objective     Vital Signs  Temp:  [97.9 °F (36.6 °C)-98.2 °F (36.8 °C)] 98.1 °F (36.7 °C)  Heart Rate:  [72-89] 80  Resp:  [20] 20  BP: (118-171)/(64-89) 118/72    I/O this shift:  In: 240 [P.O.:240]  Out: -   I/O last 3 completed shifts:  In: 1090 [P.O.:990; IV Piggyback:100]  Out: 5125 [Urine:5125]    Physical Examination:    General Appearance : alert,  no distress  Head : normocephalic  Eyes :  no pallor  Throat : oral mucosa moist  Neck:  no JVD  Lungs : reduced breath sounds at bases with expiratory wheezing  Heart : regular rhythm & normal rate, normal S1, S2, no murmur   Abdomen :  normal bowel sounds,  and soft non-tender  Extremities :  2+ edema  Pulses :  palpable and equal bilaterally  Skin : no bleeding, bruising or rash  Neurologic : orientated to person, place, time, grossly no focal deficitis    Laboratory Data :      WBC WBC   Date Value Ref Range Status   11/13/2017 4.08 (L) 4.50 - 12.50 10*3/mm3 Final   11/12/2017 4.83 4.50 - 12.50 10*3/mm3 Final      HGB Hemoglobin   Date Value Ref Range Status   11/13/2017 9.8 (L) 12.0 - 16.0 g/dL Final   11/12/2017 9.7 (L) 12.0 - 16.0 g/dL Final      HCT Hematocrit   Date Value Ref Range Status   11/13/2017 31.8 (L) 37.0 - 47.0 % Final   11/12/2017 30.9 (L) 37.0 - 47.0 % Final      Platlets No results found for: LABPLAT   MCV MCV   Date Value Ref Range Status   11/13/2017 88.1 80.0 - 94.0 fL Final   11/12/2017 88.0 80.0 - 94.0 fL Final          Sodium Sodium   Date Value Ref Range Status   11/14/2017 138 135 - 153 mmol/L Final   11/13/2017 136 135 - 153 mmol/L Final   11/12/2017 136 135 - 153 mmol/L Final      Potassium Potassium   Date Value Ref Range Status   11/14/2017 3.9 3.5 - 5.3 mmol/L Final   11/13/2017 3.7 3.5 - 5.3 mmol/L Final   11/12/2017 4.0 3.5 - 5.3 mmol/L Final      Chloride Chloride   Date Value Ref Range Status   11/14/2017 99 99 -  112 mmol/L Final   11/13/2017 101 99 - 112 mmol/L Final   11/12/2017 101 99 - 112 mmol/L Final      CO2 CO2   Date Value Ref Range Status   11/14/2017 33.2 (H) 24.3 - 31.9 mmol/L Final   11/13/2017 31.3 24.3 - 31.9 mmol/L Final   11/12/2017 31.2 24.3 - 31.9 mmol/L Final      BUN BUN   Date Value Ref Range Status   11/14/2017 43 (H) 7 - 21 mg/dL Final   11/13/2017 44 (H) 7 - 21 mg/dL Final   11/12/2017 44 (H) 7 - 21 mg/dL Final      Creatinine Creatinine   Date Value Ref Range Status   11/14/2017 2.08 (H) 0.43 - 1.29 mg/dL Final   11/13/2017 2.03 (H) 0.43 - 1.29 mg/dL Final   11/12/2017 2.01 (H) 0.43 - 1.29 mg/dL Final      Calcium Calcium   Date Value Ref Range Status   11/14/2017 9.2 7.7 - 10.0 mg/dL Final   11/13/2017 8.6 7.7 - 10.0 mg/dL Final   11/12/2017 8.5 7.7 - 10.0 mg/dL Final      PO4 No results found for: CAPO4   Albumin No results found for: ALBUMIN   Magnesium No results found for: MG   Uric Acid No results found for: URICACID     Radiology results :     Imaging Results (last 72 hours)     ** No results found for the last 72 hours. **            Medications:        aspirin 81 mg Oral Daily   carvedilol 12.5 mg Oral Q12H   furosemide (LASIX) in NS IVPB 80 mg Intravenous Once   Followed by      chlorothiazide (DIURIL) IVPB 250 mg Intravenous Once   citalopram 20 mg Oral Daily   gabapentin 400 mg Oral Nightly   hydrALAZINE 75 mg Oral Q8H   isosorbide mononitrate 60 mg Oral Q24H   pantoprazole 40 mg Oral QAM   polyethylene glycol 17 g Oral Daily   spironolactone 25 mg Oral Daily   vitamin d 5,000 Units Oral Daily   warfarin 6 mg Oral Daily       Pharmacy to dose warfarin        Assessment/Plan     Active Problems:    CHF (congestive heart failure)    Congestive heart failure      1. 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 Avoid nephrotoxins    2. Systolic  CHF exacerbation : better, diuresis as above , ct fluid restriction 1.2 L/day    3. HTN : keep -150s/80s for renal perfusion    4. CAD    I discussed the patients findings and my recommendations with patient and nursing staff    Sebas Ellis MD  11/14/17  2:04 PM

## 2017-11-15 LAB
ANION GAP SERPL CALCULATED.3IONS-SCNC: 4.4 MMOL/L (ref 3.6–11.2)
BUN BLD-MCNC: 48 MG/DL (ref 7–21)
BUN/CREAT SERPL: 23.8 (ref 7–25)
CALCIUM SPEC-SCNC: 9 MG/DL (ref 7.7–10)
CHLORIDE SERPL-SCNC: 99 MMOL/L (ref 99–112)
CO2 SERPL-SCNC: 34.6 MMOL/L (ref 24.3–31.9)
CREAT BLD-MCNC: 2.02 MG/DL (ref 0.43–1.29)
GFR SERPL CREATININE-BSD FRML MDRD: 23 ML/MIN/1.73
GLUCOSE BLD-MCNC: 98 MG/DL (ref 70–110)
INR PPP: 3.19 (ref 0.9–1.1)
OSMOLALITY SERPL CALC.SUM OF ELEC: 288.3 MOSM/KG (ref 273–305)
POTASSIUM BLD-SCNC: 4 MMOL/L (ref 3.5–5.3)
PROTHROMBIN TIME: 33.2 SECONDS (ref 11–15.4)
SODIUM BLD-SCNC: 138 MMOL/L (ref 135–153)

## 2017-11-15 PROCEDURE — 99232 SBSQ HOSP IP/OBS MODERATE 35: CPT | Performed by: INTERNAL MEDICINE

## 2017-11-15 PROCEDURE — 94799 UNLISTED PULMONARY SVC/PX: CPT

## 2017-11-15 PROCEDURE — 80048 BASIC METABOLIC PNL TOTAL CA: CPT | Performed by: INTERNAL MEDICINE

## 2017-11-15 PROCEDURE — 85610 PROTHROMBIN TIME: CPT | Performed by: FAMILY MEDICINE

## 2017-11-15 RX ORDER — METOLAZONE 2.5 MG/1
5 TABLET ORAL ONCE
Status: COMPLETED | OUTPATIENT
Start: 2017-11-15 | End: 2017-11-15

## 2017-11-15 RX ORDER — FUROSEMIDE 40 MG/1
40 TABLET ORAL EVERY 12 HOURS SCHEDULED
Status: DISCONTINUED | OUTPATIENT
Start: 2017-11-15 | End: 2017-11-17

## 2017-11-15 RX ADMIN — CARVEDILOL 12.5 MG: 6.25 TABLET, FILM COATED ORAL at 21:21

## 2017-11-15 RX ADMIN — PANTOPRAZOLE SODIUM 40 MG: 40 TABLET, DELAYED RELEASE ORAL at 06:39

## 2017-11-15 RX ADMIN — POLYETHYLENE GLYCOL 3350 17 G: 17 POWDER, FOR SOLUTION ORAL at 09:20

## 2017-11-15 RX ADMIN — GABAPENTIN 400 MG: 400 CAPSULE ORAL at 21:21

## 2017-11-15 RX ADMIN — CHOLECALCIFEROL CAP 125 MCG (5000 UNIT) 5000 UNITS: 125 CAP at 09:21

## 2017-11-15 RX ADMIN — METOLAZONE 5 MG: 2.5 TABLET ORAL at 09:20

## 2017-11-15 RX ADMIN — CARVEDILOL 12.5 MG: 6.25 TABLET, FILM COATED ORAL at 09:20

## 2017-11-15 RX ADMIN — HYDROCODONE BITARTRATE AND ACETAMINOPHEN 1 TABLET: 7.5; 325 TABLET ORAL at 21:21

## 2017-11-15 RX ADMIN — CITALOPRAM HYDROBROMIDE 20 MG: 20 TABLET ORAL at 09:20

## 2017-11-15 RX ADMIN — ISOSORBIDE MONONITRATE 60 MG: 60 TABLET, EXTENDED RELEASE ORAL at 09:20

## 2017-11-15 RX ADMIN — HYDRALAZINE HYDROCHLORIDE 75 MG: 50 TABLET ORAL at 13:32

## 2017-11-15 RX ADMIN — SPIRONOLACTONE 25 MG: 25 TABLET ORAL at 09:20

## 2017-11-15 RX ADMIN — HYDRALAZINE HYDROCHLORIDE 75 MG: 50 TABLET ORAL at 21:21

## 2017-11-15 RX ADMIN — WARFARIN SODIUM 5 MG: 5 TABLET ORAL at 16:56

## 2017-11-15 RX ADMIN — ASPIRIN 81 MG: 81 TABLET ORAL at 09:20

## 2017-11-15 RX ADMIN — FUROSEMIDE 40 MG: 40 TABLET ORAL at 09:20

## 2017-11-15 RX ADMIN — FUROSEMIDE 40 MG: 40 TABLET ORAL at 21:21

## 2017-11-15 NOTE — PROGRESS NOTES
Tre Kinney 84 y.o.   11/15/17    Subjective: Patient had relatively uneventful day yesterday but did ambulate  Objective: Vital signs stable chest bilateral crackles abdomen negative extremities with plus edema  Vital Signs (last 72 hrs)       11/11 0700  -  11/12 0659 11/12 0700  -  11/13 0659 11/13 0700  -  11/14 0659 11/14 0700  -  11/15 0630   Most Recent    Temp (°F) 97.5 -  97.8    97.5 -  98    97.8 -  98.2    97.8 -  98.6     98.6 (37)    Heart Rate 79 -  81    77 -  92    72 -  89    79 -  82     80    Resp 18 -  20    18 -  20      20    18 -  20     20    /82 -  155/72    120/79 -  153/81    118/72 -  171/89    120/74 -  136/83     128/72    SpO2 (%) 92 -  97    94 -  97    96 -  99    94 -  99     94        Lab Results (last 72 hours)     Procedure Component Value Units Date/Time    Troponin [[id number]]  (Abnormal) Collected:  11/12/17 0657    Specimen:  Blood Updated:  11/12/17 0754     Troponin I 0.043 (H) ng/mL     Narrative:       Ultra Troponin I Reference Range:         <=0.039 ng/mL: Negative    0.04-0.779 ng/mL: Indeterminate Range. Suspicious of MI.  Clinical correlation required.       >=0.78  ng/mL: Consistent with myocardial injury.  Clinical correlation required.    CBC & Differential [[id number]] Collected:  11/13/17 0022    Specimen:  Blood Updated:  11/13/17 0141    Narrative:       The following orders were created for panel order CBC & Differential.  Procedure                               Abnormality         Status                     ---------                               -----------         ------                     CBC Auto Differential[[id number]]        Abnormal            Final result                 Please view results for these tests on the individual orders.    CBC Auto Differential [[id number]]  (Abnormal) Collected:  11/13/17 0022    Specimen:  Blood Updated:  11/13/17 0141     WBC 4.08 (L) 10*3/mm3      RBC 3.61 (L) 10*6/mm3      Hemoglobin 9.8 (L) g/dL       Hematocrit 31.8 (L) %      MCV 88.1 fL      MCH 27.1 pg      MCHC 30.8 (L) g/dL      RDW 15.8 (H) %      RDW-SD 49.7 fl      MPV 10.3 (H) fL      Platelets 165 10*3/mm3      Neutrophil % 71.4 %      Lymphocyte % 12.0 (L) %      Monocyte % 11.0 %      Eosinophil % 4.9 %      Basophil % 0.7 %      Immature Grans % 0.0 %      Neutrophils, Absolute 2.91 10*3/mm3      Lymphocytes, Absolute 0.49 (L) 10*3/mm3      Monocytes, Absolute 0.45 10*3/mm3      Eosinophils, Absolute 0.20 10*3/mm3      Basophils, Absolute 0.03 10*3/mm3      Immature Grans, Absolute 0.00 10*3/mm3     Protime-INR [[id number]]  (Abnormal) Collected:  11/13/17 0022    Specimen:  Blood Updated:  11/13/17 0148     Protime 29.6 (H) Seconds       Note new Reference Range        INR 2.77 (H)    Narrative:       Suggested INR therapeutic range for stable oral anticoagulant therapy:    Low Intensity therapy:   1.5-2.0  Moderate Intensity therapy:   2.0-3.0  High Intensity therapy:   2.5-4.0    Basic Metabolic Panel [[id number]]  (Abnormal) Collected:  11/13/17 0022    Specimen:  Blood Updated:  11/13/17 0202     Glucose 120 (H) mg/dL      BUN 44 (H) mg/dL      Creatinine 2.03 (H) mg/dL      Sodium 136 mmol/L      Potassium 3.7 mmol/L      Chloride 101 mmol/L      CO2 31.3 mmol/L      Calcium 8.6 mg/dL      eGFR Non African Amer 23 (L) mL/min/1.73      BUN/Creatinine Ratio 21.7     Anion Gap 3.7 mmol/L     Narrative:       The MDRD GFR formula is only valid for adults with stable renal function between ages 18 and 70.    Osmolality, Calculated [[id number]]  (Normal) Collected:  11/13/17 0022    Specimen:  Blood Updated:  11/13/17 0202     Osmolality Calc 284.3 mOsm/kg     BNP [[id number]]  (Abnormal) Collected:  11/13/17 1122    Specimen:  Blood Updated:  11/13/17 1213     BNP 1163.0 (H) pg/mL     Protime-INR [[id number]]  (Abnormal) Collected:  11/14/17 0036    Specimen:  Blood Updated:  11/14/17 0128     Protime 32.0 (H) Seconds       Note new Reference Range         INR 3.05 (H)    Narrative:       Suggested INR therapeutic range for stable oral anticoagulant therapy:    Low Intensity therapy:   1.5-2.0  Moderate Intensity therapy:   2.0-3.0  High Intensity therapy:   2.5-4.0    Basic Metabolic Panel [[id number]]  (Abnormal) Collected:  11/14/17 0810    Specimen:  Blood Updated:  11/14/17 0933     Glucose 95 mg/dL      BUN 43 (H) mg/dL      Creatinine 2.08 (H) mg/dL      Sodium 138 mmol/L      Potassium 3.9 mmol/L      Chloride 99 mmol/L      CO2 33.2 (H) mmol/L      Calcium 9.2 mg/dL      eGFR Non African Amer 23 (L) mL/min/1.73      BUN/Creatinine Ratio 20.7     Anion Gap 5.8 mmol/L     Narrative:       The MDRD GFR formula is only valid for adults with stable renal function between ages 18 and 70.    Osmolality, Calculated [[id number]]  (Normal) Collected:  11/14/17 0810    Specimen:  Blood Updated:  11/14/17 0933     Osmolality Calc 286.3 mOsm/kg     Protime-INR [[id number]]  (Abnormal) Collected:  11/15/17 0107    Specimen:  Blood Updated:  11/15/17 0623     Protime 33.2 (H) Seconds       Note new Reference Range        INR 3.19 (H)    Narrative:       Suggested INR therapeutic range for stable oral anticoagulant therapy:    Low Intensity therapy:   1.5-2.0  Moderate Intensity therapy:   2.0-3.0  High Intensity therapy:   2.5-4.0    Basic Metabolic Panel [[id number]]  (Abnormal) Collected:  11/15/17 0107    Specimen:  Blood Updated:  11/15/17 0624     Glucose 98 mg/dL      BUN 48 (H) mg/dL      Creatinine 2.02 (H) mg/dL      Sodium 138 mmol/L      Potassium 4.0 mmol/L      Chloride 99 mmol/L      CO2 34.6 (H) mmol/L      Calcium 9.0 mg/dL      eGFR Non African Amer 23 (L) mL/min/1.73      BUN/Creatinine Ratio 23.8     Anion Gap 4.4 mmol/L     Narrative:       The MDRD GFR formula is only valid for adults with stable renal function between ages 18 and 70.    Osmolality, Calculated [[id number]]  (Normal) Collected:  11/15/17 0107    Specimen:  Blood Updated:  11/15/17 0624      Osmolality Calc 288.3 mOsm/kg         Imaging Results (last 24 hours)     ** No results found for the last 24 hours. **        Assessment:Active Problems:    CHF (congestive heart failure)    Congestive heart failure   Chronic kidney disease for  Plan: Continue with renal assistance and diaphoresis

## 2017-11-15 NOTE — PROGRESS NOTES
Tre Kinney 84 y.o.   11/15/17    Subjective: Patient doing slightly better but still low week less swelling in her legs  Objective:His vital signs stable clear abdomen soft  Vital Signs (last 72 hrs)       11/11 0700  -  11/12 0659 11/12 0700  -  11/13 0659 11/13 0700  -  11/14 0659 11/14 0700  -  11/15 0628   Most Recent    Temp (°F) 97.5 -  97.8    97.5 -  98    97.8 -  98.2    97.8 -  98.6     98.6 (37)    Heart Rate 79 -  81    77 -  92    72 -  89    79 -  82     80    Resp 18 -  20    18 -  20      20    18 -  20     20    /82 -  155/72    120/79 -  153/81    118/72 -  171/89    120/74 -  136/83     128/72    SpO2 (%) 92 -  97    94 -  97    96 -  99    94 -  99     94        Lab Results (last 72 hours)     Procedure Component Value Units Date/Time    Troponin [[id number]]  (Abnormal) Collected:  11/12/17 0657    Specimen:  Blood Updated:  11/12/17 0754     Troponin I 0.043 (H) ng/mL     Narrative:       Ultra Troponin I Reference Range:         <=0.039 ng/mL: Negative    0.04-0.779 ng/mL: Indeterminate Range. Suspicious of MI.  Clinical correlation required.       >=0.78  ng/mL: Consistent with myocardial injury.  Clinical correlation required.    CBC & Differential [[id number]] Collected:  11/13/17 0022    Specimen:  Blood Updated:  11/13/17 0141    Narrative:       The following orders were created for panel order CBC & Differential.  Procedure                               Abnormality         Status                     ---------                               -----------         ------                     CBC Auto Differential[[id number]]        Abnormal            Final result                 Please view results for these tests on the individual orders.    CBC Auto Differential [[id number]]  (Abnormal) Collected:  11/13/17 0022    Specimen:  Blood Updated:  11/13/17 0141     WBC 4.08 (L) 10*3/mm3      RBC 3.61 (L) 10*6/mm3      Hemoglobin 9.8 (L) g/dL      Hematocrit 31.8 (L) %      MCV 88.1 fL       MCH 27.1 pg      MCHC 30.8 (L) g/dL      RDW 15.8 (H) %      RDW-SD 49.7 fl      MPV 10.3 (H) fL      Platelets 165 10*3/mm3      Neutrophil % 71.4 %      Lymphocyte % 12.0 (L) %      Monocyte % 11.0 %      Eosinophil % 4.9 %      Basophil % 0.7 %      Immature Grans % 0.0 %      Neutrophils, Absolute 2.91 10*3/mm3      Lymphocytes, Absolute 0.49 (L) 10*3/mm3      Monocytes, Absolute 0.45 10*3/mm3      Eosinophils, Absolute 0.20 10*3/mm3      Basophils, Absolute 0.03 10*3/mm3      Immature Grans, Absolute 0.00 10*3/mm3     Protime-INR [[id number]]  (Abnormal) Collected:  11/13/17 0022    Specimen:  Blood Updated:  11/13/17 0148     Protime 29.6 (H) Seconds       Note new Reference Range        INR 2.77 (H)    Narrative:       Suggested INR therapeutic range for stable oral anticoagulant therapy:    Low Intensity therapy:   1.5-2.0  Moderate Intensity therapy:   2.0-3.0  High Intensity therapy:   2.5-4.0    Basic Metabolic Panel [[id number]]  (Abnormal) Collected:  11/13/17 0022    Specimen:  Blood Updated:  11/13/17 0202     Glucose 120 (H) mg/dL      BUN 44 (H) mg/dL      Creatinine 2.03 (H) mg/dL      Sodium 136 mmol/L      Potassium 3.7 mmol/L      Chloride 101 mmol/L      CO2 31.3 mmol/L      Calcium 8.6 mg/dL      eGFR Non African Amer 23 (L) mL/min/1.73      BUN/Creatinine Ratio 21.7     Anion Gap 3.7 mmol/L     Narrative:       The MDRD GFR formula is only valid for adults with stable renal function between ages 18 and 70.    Osmolality, Calculated [[id number]]  (Normal) Collected:  11/13/17 0022    Specimen:  Blood Updated:  11/13/17 0202     Osmolality Calc 284.3 mOsm/kg     BNP [[id number]]  (Abnormal) Collected:  11/13/17 1122    Specimen:  Blood Updated:  11/13/17 1213     BNP 1163.0 (H) pg/mL     Protime-INR [[id number]]  (Abnormal) Collected:  11/14/17 0036    Specimen:  Blood Updated:  11/14/17 0128     Protime 32.0 (H) Seconds       Note new Reference Range        INR 3.05 (H)    Narrative:        Suggested INR therapeutic range for stable oral anticoagulant therapy:    Low Intensity therapy:   1.5-2.0  Moderate Intensity therapy:   2.0-3.0  High Intensity therapy:   2.5-4.0    Basic Metabolic Panel [[id number]]  (Abnormal) Collected:  11/14/17 0810    Specimen:  Blood Updated:  11/14/17 0933     Glucose 95 mg/dL      BUN 43 (H) mg/dL      Creatinine 2.08 (H) mg/dL      Sodium 138 mmol/L      Potassium 3.9 mmol/L      Chloride 99 mmol/L      CO2 33.2 (H) mmol/L      Calcium 9.2 mg/dL      eGFR Non African Amer 23 (L) mL/min/1.73      BUN/Creatinine Ratio 20.7     Anion Gap 5.8 mmol/L     Narrative:       The MDRD GFR formula is only valid for adults with stable renal function between ages 18 and 70.    Osmolality, Calculated [[id number]]  (Normal) Collected:  11/14/17 0810    Specimen:  Blood Updated:  11/14/17 0933     Osmolality Calc 286.3 mOsm/kg     Protime-INR [[id number]]  (Abnormal) Collected:  11/15/17 0107    Specimen:  Blood Updated:  11/15/17 0623     Protime 33.2 (H) Seconds       Note new Reference Range        INR 3.19 (H)    Narrative:       Suggested INR therapeutic range for stable oral anticoagulant therapy:    Low Intensity therapy:   1.5-2.0  Moderate Intensity therapy:   2.0-3.0  High Intensity therapy:   2.5-4.0    Basic Metabolic Panel [[id number]]  (Abnormal) Collected:  11/15/17 0107    Specimen:  Blood Updated:  11/15/17 0624     Glucose 98 mg/dL      BUN 48 (H) mg/dL      Creatinine 2.02 (H) mg/dL      Sodium 138 mmol/L      Potassium 4.0 mmol/L      Chloride 99 mmol/L      CO2 34.6 (H) mmol/L      Calcium 9.0 mg/dL      eGFR Non African Amer 23 (L) mL/min/1.73      BUN/Creatinine Ratio 23.8     Anion Gap 4.4 mmol/L     Narrative:       The MDRD GFR formula is only valid for adults with stable renal function between ages 18 and 70.    Osmolality, Calculated [[id number]]  (Normal) Collected:  11/15/17 0107    Specimen:  Blood Updated:  11/15/17 0624     Osmolality Calc 288.3 mOsm/kg          Imaging Results (last 24 hours)     ** No results found for the last 24 hours. **        Assessment:Active Problems:    CHF (congestive heart failure)    Congestive heart failure     Plan:T with diuresis and physical therapy

## 2017-11-15 NOTE — PROGRESS NOTES
LOS: 5 days   Patient Care Team:  Myles Barone MD as PCP - General  Myles Barone MD as PCP - Family Medicine  Myles Barone MD as PCP - Claims Attributed  Kelli Márquez RN as Care Coordinator (Population Health)      Subjective     Admission information:    84-year-old woman with history of arteriosclerotic CAD status post stent done about 3 years ago-details not known and ischemic cardiomyopathy with ejection fraction 35-40% per echocardiogram done 7/2017 and repeated hospitalization for CHF has been admitted with chief complaint of increasing shortness of breath upon exertion which has been progressive and now gets this symptom on minimal exertion associated with progressive bilateral leg edema.  She is taking furosemide 40 mg by mouth daily at home with no improvement in her symptoms.  Associated with generalized weakness and easy fatigability.  Her BNP level was more than 1500 and chest x-ray showed left-sided pleural effusion and possible bypass graft consolidation.  Patient has chronic kidney disease with creatinine of 2.2.  Patient was given furosemide 40 mg IV ×1.     She denied history of chest pain or angina.  No history of dizziness, palpitation or syncope.     Her cardiovascular history is remarkable for arteriosclerotic CAD status post stent in 2014-details not known, chronic systolic CHF, ischemic cardiomyopathy with EF 35-40%, atrial fibrillation, long-term anticoagulation with Coumadin, sick sinus syndrome and status post pacemaker implantation.     Cardiac risk factors-hypertension, hyperlipidemia and family history of heart disease.  No history of smoking.     She has chronic kidney disease with creatinine of 2.2.    Interval History:     According to the patient and her breathing is somewhat improved today.  She denies any chest pain, shortness of breath or palpitations.    History taken from: patient chart    Vital Signs  Temp:  [97.8 °F (36.6 °C)-98.6 °F (37 °C)] 98 °F  (36.7 °C)  Heart Rate:  [79-82] 80  Resp:  [18-20] 20  BP: (125-143)/(43-89) 143/89    Physical Exam:     Physical Exam   Constitutional: She is oriented to person, place, and time. She appears well-developed and well-nourished.   Elderly and obese white female sitting comfortably on bed.   HENT:   Mouth/Throat: Oropharynx is clear and moist.   Eyes: EOM are normal. Pupils are equal, round, and reactive to light.   Neck: Neck supple. No JVD present. No tracheal deviation present. No thyromegaly present.   Cardiovascular: Normal rate, regular rhythm, S1 normal and S2 normal.  Exam reveals no gallop and no friction rub.    Murmur heard.   Harsh midsystolic murmur is present with a grade of 2/6  at the upper right sternal border radiating to the neck  Pulmonary/Chest: Effort normal. No respiratory distress. She has no wheezes. She has rales.   Abdominal: Soft. Bowel sounds are normal. She exhibits no mass. There is no tenderness.   Musculoskeletal: Normal range of motion. She exhibits edema (Mild bilateral lower extremity edema.).   Lymphadenopathy:     She has no cervical adenopathy.   Neurological: She is alert and oriented to person, place, and time.   Skin: Skin is warm and dry. No rash noted.   Psychiatric: She has a normal mood and affect.       Results Review:    I reviewed the patient's new clinical results.  I reviewed the patient's new imaging results and agree with the interpretation.  I reviewed the patient's other test results and agree with the interpretation  I personally viewed and interpreted the patient's EKG/Telemetry data    Medication:  Scheduled Meds:    aspirin 81 mg Oral Daily   carvedilol 12.5 mg Oral Q12H   citalopram 20 mg Oral Daily   furosemide 40 mg Oral Q12H   gabapentin 400 mg Oral Nightly   hydrALAZINE 75 mg Oral Q8H   isosorbide mononitrate 60 mg Oral Q24H   pantoprazole 40 mg Oral QAM   polyethylene glycol 17 g Oral Daily   spironolactone 25 mg Oral Daily   vitamin d 5,000 Units Oral  Daily   warfarin 5 mg Oral Daily     Continuous Infusions:    Pharmacy to dose warfarin      PRN Meds:.HYDROcodone-acetaminophen  •  Pharmacy to dose warfarin  •  sodium chloride    Telemetry:Atrial fibrillation with paced rhythm in the 80s.      Assessment/Plan     1.  Congestive heart failure:  patient with congestive heart failure with an ejection fraction of 35-40% on last echocardiogram.  She was admitted due to volume overload which is likely related to heart failure exacerbation as well as renal failure.  She is currently undergoing diuresis.  She is on metoprolol and BiDil equivalent.    2.  Coronary artery disease: Patient with history of coronary artery disease currently on aspirin, beta blocker and isosorbide mononitrate.  She is not on a statin due to intolerance.   She reports no episodes of chest pain at this point.  We'll continue current regimen.     3.  Dyslipidemia: Patient with history of dyslipidemia for which she is currently on no medication due to statin intolerance.      4.  Hypertension: Patient with a history of hypertension that appears to be well-controlled on current regimen.     5.  Shortness of breath: Patient with history of shortness of breath but appears to be a combination of heart failure and pulmonary lung disease.     6.   Atrial fibrillation: Patient with a history of atrial fibrillation which is currently well rate controlled on metoprolol.  She is on Coumadin for stroke prevention.    Plan:    1.  Congestive heart failure: The patient's BNP appears to be improving.  Diuresis being controlled by nephrology.  Her renal function appears to have been stable.    2.  Coronary artery disease: At this point we will continue current regimen.  She has no evidence of an acute coronary event.    4.  Hypertension: The patient's blood pressure appears to be well-controlled on current regimen.  At this point would continue.    5.  Atrial fibrillation: At this point continue beta blocker for  rate control.  Her INR Has continued to increase.  Her INR goal is 2-2.5.  Would adjust regimen to maintain it.    Nba Arora MD  11/15/17  12:46 PM    Dragon disclaimer:  Much of this encounter note is an electronic transcription/translation of spoken language to printed text. The electronic translation of spoken language may permit erroneous, or at times, nonsensical words or phrases to be inadvertently transcribed; Although I have reviewed the note for such errors, some may still exist.

## 2017-11-15 NOTE — PROGRESS NOTES
Discharge Planning Assessment   [place]     Patient Name: Tre Kinney  MRN: [id number]  Today's Date: 11/15/2017    Admit Date: 11/8/2017          Discharge Needs Assessment     None            Discharge Plan       11/15/17 1154    Case Management/Social Work Plan    Plan Pt admitted on 11/08/17.  Pt lives at home with son, Ken, and plans to return home at discharge.  Pt currently does not utilize home health services.  Pt currently utilizes home 02, rolling walker, bedside commode, shower chair via [facility].  SS will follow and assist with discharge needs.     Patient/Family In Agreement With Plan yes        Discharge Placement     No information found                Demographic Summary     None            Functional Status     None            Psychosocial     None            Abuse/Neglect     None            Legal     None            Substance Abuse     None            Patient Forms     None          Mary Salgado

## 2017-11-15 NOTE — PLAN OF CARE
Problem: Fluid Volume Excess (Adult,Obstetrics,Pediatric)  Goal: Stable Weight  Outcome: Ongoing (interventions implemented as appropriate)  Goal: Balanced Intake/Output  Outcome: Ongoing (interventions implemented as appropriate)    Problem: Pain, Acute (Adult)  Goal: Identify Related Risk Factors and Signs and Symptoms  Outcome: Ongoing (interventions implemented as appropriate)  Goal: Acceptable Pain Control/Comfort Level  Outcome: Ongoing (interventions implemented as appropriate)    Problem: Pressure Ulcer Risk (Diogo Scale) (Adult,Obstetrics,Pediatric)  Goal: Identify Related Risk Factors and Signs and Symptoms  Outcome: Ongoing (interventions implemented as appropriate)  Goal: Skin Integrity  Outcome: Ongoing (interventions implemented as appropriate)

## 2017-11-15 NOTE — PROGRESS NOTES
Nephrology  Note        Subjective       She continues to improve and breathing is better. She feels edema is improving as well    Objective     Vital Signs  Temp:  [97.8 °F (36.6 °C)-98.6 °F (37 °C)] 98 °F (36.7 °C)  Heart Rate:  [79-82] 80  Resp:  [18-20] 20  BP: (125-143)/(43-89) 143/89       I/O last 3 completed shifts:  In: 1810 [P.O.:1610; IV Piggyback:200]  Out: 5175 [Urine:5175]    Physical Examination:    General Appearance : alert,  no distress  Head : normocephalic  Eyes :  no pallor  Throat : oral mucosa moist  Neck:  no JVD  Lungs : reduced breath sounds at bases. No crackles  Heart : regular rhythm & normal rate, normal S1, S2, no murmur   Abdomen :  normal bowel sounds,  and soft non-tender  Extremities :  1+ edema  Skin : no bleeding, bruising or rash  Neurologic : grossly no focal deficitis    Laboratory Data :      WBC WBC   Date Value Ref Range Status   11/13/2017 4.08 (L) 4.50 - 12.50 10*3/mm3 Final      HGB Hemoglobin   Date Value Ref Range Status   11/13/2017 9.8 (L) 12.0 - 16.0 g/dL Final      HCT Hematocrit   Date Value Ref Range Status   11/13/2017 31.8 (L) 37.0 - 47.0 % Final      Platlets No results found for: LABPLAT   MCV MCV   Date Value Ref Range Status   11/13/2017 88.1 80.0 - 94.0 fL Final          Sodium Sodium   Date Value Ref Range Status   11/15/2017 138 135 - 153 mmol/L Final   11/14/2017 138 135 - 153 mmol/L Final   11/13/2017 136 135 - 153 mmol/L Final      Potassium Potassium   Date Value Ref Range Status   11/15/2017 4.0 3.5 - 5.3 mmol/L Final   11/14/2017 3.9 3.5 - 5.3 mmol/L Final   11/13/2017 3.7 3.5 - 5.3 mmol/L Final      Chloride Chloride   Date Value Ref Range Status   11/15/2017 99 99 - 112 mmol/L Final   11/14/2017 99 99 - 112 mmol/L Final   11/13/2017 101 99 - 112 mmol/L Final      CO2 CO2   Date Value Ref Range Status   11/15/2017 34.6 (H) 24.3 - 31.9 mmol/L Final   11/14/2017 33.2 (H) 24.3 - 31.9 mmol/L Final   11/13/2017 31.3 24.3 - 31.9 mmol/L Final      BUN  BUN   Date Value Ref Range Status   11/15/2017 48 (H) 7 - 21 mg/dL Final   11/14/2017 43 (H) 7 - 21 mg/dL Final   11/13/2017 44 (H) 7 - 21 mg/dL Final      Creatinine Creatinine   Date Value Ref Range Status   11/15/2017 2.02 (H) 0.43 - 1.29 mg/dL Final   11/14/2017 2.08 (H) 0.43 - 1.29 mg/dL Final   11/13/2017 2.03 (H) 0.43 - 1.29 mg/dL Final      Calcium Calcium   Date Value Ref Range Status   11/15/2017 9.0 7.7 - 10.0 mg/dL Final   11/14/2017 9.2 7.7 - 10.0 mg/dL Final   11/13/2017 8.6 7.7 - 10.0 mg/dL Final      PO4 No results found for: CAPO4   Albumin No results found for: ALBUMIN   Magnesium No results found for: MG   Uric Acid No results found for: URICACID     Radiology results :     Imaging Results (last 72 hours)     ** No results found for the last 72 hours. **            Medications:        aspirin 81 mg Oral Daily   carvedilol 12.5 mg Oral Q12H   citalopram 20 mg Oral Daily   furosemide 40 mg Oral Q12H   gabapentin 400 mg Oral Nightly   hydrALAZINE 75 mg Oral Q8H   isosorbide mononitrate 60 mg Oral Q24H   pantoprazole 40 mg Oral QAM   polyethylene glycol 17 g Oral Daily   spironolactone 25 mg Oral Daily   vitamin d 5,000 Units Oral Daily   warfarin 5 mg Oral Daily       Pharmacy to dose warfarin        Assessment/Plan     Active Problems:    CHF (congestive heart failure)    Congestive heart failure      1. 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 She is developing contraction alkalosis so will change to lasix 40 mg po bid and also give one dose of metolazone today   Avoid nephrotoxins    2. Systolic CHF exacerbation : better, diuresis as above , ct fluid restriction 1.2 L/day    3. HTN : keep -150s/80s for renal perfusion    4. CAD    I discussed the patients findings and my recommendations with patient and nursing staff    Sebas Ellis MD  11/15/17  10:27 AM

## 2017-11-16 LAB
ANION GAP SERPL CALCULATED.3IONS-SCNC: 6.1 MMOL/L (ref 3.6–11.2)
BUN BLD-MCNC: 52 MG/DL (ref 7–21)
BUN/CREAT SERPL: 24.5 (ref 7–25)
CALCIUM SPEC-SCNC: 9.3 MG/DL (ref 7.7–10)
CHLORIDE SERPL-SCNC: 97 MMOL/L (ref 99–112)
CO2 SERPL-SCNC: 35.9 MMOL/L (ref 24.3–31.9)
CREAT BLD-MCNC: 2.12 MG/DL (ref 0.43–1.29)
GFR SERPL CREATININE-BSD FRML MDRD: 22 ML/MIN/1.73
GLUCOSE BLD-MCNC: 91 MG/DL (ref 70–110)
INR PPP: 2.81 (ref 0.9–1.1)
OSMOLALITY SERPL CALC.SUM OF ELEC: 291.2 MOSM/KG (ref 273–305)
POTASSIUM BLD-SCNC: 3.9 MMOL/L (ref 3.5–5.3)
PROTHROMBIN TIME: 30 SECONDS (ref 11–15.4)
SODIUM BLD-SCNC: 139 MMOL/L (ref 135–153)

## 2017-11-16 PROCEDURE — 80048 BASIC METABOLIC PNL TOTAL CA: CPT | Performed by: INTERNAL MEDICINE

## 2017-11-16 PROCEDURE — 99232 SBSQ HOSP IP/OBS MODERATE 35: CPT | Performed by: INTERNAL MEDICINE

## 2017-11-16 PROCEDURE — 85610 PROTHROMBIN TIME: CPT | Performed by: FAMILY MEDICINE

## 2017-11-16 PROCEDURE — 94799 UNLISTED PULMONARY SVC/PX: CPT

## 2017-11-16 RX ADMIN — SPIRONOLACTONE 25 MG: 25 TABLET ORAL at 08:32

## 2017-11-16 RX ADMIN — GABAPENTIN 400 MG: 400 CAPSULE ORAL at 20:42

## 2017-11-16 RX ADMIN — HYDROCODONE BITARTRATE AND ACETAMINOPHEN 1 TABLET: 7.5; 325 TABLET ORAL at 20:42

## 2017-11-16 RX ADMIN — HYDRALAZINE HYDROCHLORIDE 75 MG: 50 TABLET ORAL at 04:48

## 2017-11-16 RX ADMIN — ASPIRIN 81 MG: 81 TABLET ORAL at 08:32

## 2017-11-16 RX ADMIN — POLYETHYLENE GLYCOL 3350 17 G: 17 POWDER, FOR SOLUTION ORAL at 08:32

## 2017-11-16 RX ADMIN — FUROSEMIDE 40 MG: 40 TABLET ORAL at 20:35

## 2017-11-16 RX ADMIN — HYDRALAZINE HYDROCHLORIDE 75 MG: 50 TABLET ORAL at 20:35

## 2017-11-16 RX ADMIN — ISOSORBIDE MONONITRATE 60 MG: 60 TABLET, EXTENDED RELEASE ORAL at 08:32

## 2017-11-16 RX ADMIN — CARVEDILOL 12.5 MG: 6.25 TABLET, FILM COATED ORAL at 20:35

## 2017-11-16 RX ADMIN — HYDRALAZINE HYDROCHLORIDE 75 MG: 50 TABLET ORAL at 14:16

## 2017-11-16 RX ADMIN — FUROSEMIDE 40 MG: 40 TABLET ORAL at 08:32

## 2017-11-16 RX ADMIN — PANTOPRAZOLE SODIUM 40 MG: 40 TABLET, DELAYED RELEASE ORAL at 04:48

## 2017-11-16 RX ADMIN — CITALOPRAM HYDROBROMIDE 20 MG: 20 TABLET ORAL at 08:32

## 2017-11-16 RX ADMIN — WARFARIN SODIUM 5 MG: 5 TABLET ORAL at 17:02

## 2017-11-16 RX ADMIN — CHOLECALCIFEROL CAP 125 MCG (5000 UNIT) 5000 UNITS: 125 CAP at 08:32

## 2017-11-16 RX ADMIN — CARVEDILOL 12.5 MG: 6.25 TABLET, FILM COATED ORAL at 08:32

## 2017-11-16 NOTE — PROGRESS NOTES
Nephrology  Note        Subjective       Much improved breathing. Has lost 15 lbs. Feeling well    Objective     Vital Signs  Temp:  [97.6 °F (36.4 °C)-98.8 °F (37.1 °C)] 98 °F (36.7 °C)  Heart Rate:  [78-95] 79  Resp:  [16-20] 16  BP: (116-148)/(66-89) 135/66       I/O last 3 completed shifts:  In: 2000 [P.O.:1900; IV Piggyback:100]  Out: 4800 [Urine:4800]    Physical Examination:    General Appearance : alert,  no distress  Head : normocephalic  Eyes :  no pallor  Throat : oral mucosa moist  Neck:  no JVD  Lungs : reduced breath sounds at bases. No crackles  Heart : regular rhythm & normal rate, normal S1, S2, no murmur   Abdomen :  normal bowel sounds,  and soft non-tender  Extremities :  1+ edema - better  Skin : no bleeding, bruising or rash  Neurologic : grossly no focal deficitis    Laboratory Data :      WBC No results found for: WBC   HGB No results found for: HGB   HCT No results found for: HCT   Platlets No results found for: LABPLAT   MCV No results found for: MCV       Sodium Sodium   Date Value Ref Range Status   11/16/2017 139 135 - 153 mmol/L Final   11/15/2017 138 135 - 153 mmol/L Final   11/14/2017 138 135 - 153 mmol/L Final      Potassium Potassium   Date Value Ref Range Status   11/16/2017 3.9 3.5 - 5.3 mmol/L Final   11/15/2017 4.0 3.5 - 5.3 mmol/L Final   11/14/2017 3.9 3.5 - 5.3 mmol/L Final      Chloride Chloride   Date Value Ref Range Status   11/16/2017 97 (L) 99 - 112 mmol/L Final   11/15/2017 99 99 - 112 mmol/L Final   11/14/2017 99 99 - 112 mmol/L Final      CO2 CO2   Date Value Ref Range Status   11/16/2017 35.9 (H) 24.3 - 31.9 mmol/L Final   11/15/2017 34.6 (H) 24.3 - 31.9 mmol/L Final   11/14/2017 33.2 (H) 24.3 - 31.9 mmol/L Final      BUN BUN   Date Value Ref Range Status   11/16/2017 52 (H) 7 - 21 mg/dL Final   11/15/2017 48 (H) 7 - 21 mg/dL Final   11/14/2017 43 (H) 7 - 21 mg/dL Final      Creatinine Creatinine   Date Value Ref Range Status   11/16/2017 2.12 (H) 0.43 - 1.29 mg/dL  Final   11/15/2017 2.02 (H) 0.43 - 1.29 mg/dL Final   11/14/2017 2.08 (H) 0.43 - 1.29 mg/dL Final      Calcium Calcium   Date Value Ref Range Status   11/16/2017 9.3 7.7 - 10.0 mg/dL Final   11/15/2017 9.0 7.7 - 10.0 mg/dL Final   11/14/2017 9.2 7.7 - 10.0 mg/dL Final      PO4 No results found for: CAPO4   Albumin No results found for: ALBUMIN   Magnesium No results found for: MG   Uric Acid No results found for: URICACID     Radiology results :     Imaging Results (last 72 hours)     ** No results found for the last 72 hours. **            Medications:        aspirin 81 mg Oral Daily   carvedilol 12.5 mg Oral Q12H   citalopram 20 mg Oral Daily   furosemide 40 mg Oral Q12H   gabapentin 400 mg Oral Nightly   hydrALAZINE 75 mg Oral Q8H   isosorbide mononitrate 60 mg Oral Q24H   pantoprazole 40 mg Oral QAM   polyethylene glycol 17 g Oral Daily   spironolactone 25 mg Oral Daily   vitamin d 5,000 Units Oral Daily   warfarin 5 mg Oral Daily       Pharmacy to dose warfarin        Assessment/Plan     Active Problems:    CHF (congestive heart failure)    Congestive heart failure      1. 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 Will continue lasix 40 mg po bid but no metolazone today   Avoid nephrotoxins    2. Systolic CHF exacerbation : resolved , ct fluid restriction 1.2 L/day    3. HTN : keep -150s/80s for renal perfusion    4. CAD    Ok to dc from renal standpoint on lasix 40 mg po BID and metolazone 5 mg po PRN for weight gain of > 3 lbs  I discussed the patients findings and my recommendations with patient and nursing staff    Sebas Ellis MD  11/16/17  12:01 PM

## 2017-11-16 NOTE — PROGRESS NOTES
LOS: 6 days   Patient Care Team:  Myles Barone MD as PCP - General  Myles Barone MD as PCP - Family Medicine  Myles Barone MD as PCP - Claims Attributed  Kelli Márquez RN as Care Coordinator (Population Health)      Subjective     Admission information:    84-year-old woman with history of arteriosclerotic CAD status post stent done about 3 years ago-details not known and ischemic cardiomyopathy with ejection fraction 35-40% per echocardiogram done 7/2017 and repeated hospitalization for CHF has been admitted with chief complaint of increasing shortness of breath upon exertion which has been progressive and now gets this symptom on minimal exertion associated with progressive bilateral leg edema.  She is taking furosemide 40 mg by mouth daily at home with no improvement in her symptoms.  Associated with generalized weakness and easy fatigability.  Her BNP level was more than 1500 and chest x-ray showed left-sided pleural effusion and possible bypass graft consolidation.  Patient has chronic kidney disease with creatinine of 2.2.  Patient was given furosemide 40 mg IV ×1.     She denied history of chest pain or angina.  No history of dizziness, palpitation or syncope.     Her cardiovascular history is remarkable for arteriosclerotic CAD status post stent in 2014-details not known, chronic systolic CHF, ischemic cardiomyopathy with EF 35-40%, atrial fibrillation, long-term anticoagulation with Coumadin, sick sinus syndrome and status post pacemaker implantation.     Cardiac risk factors-hypertension, hyperlipidemia and family history of heart disease.  No history of smoking.     She has chronic kidney disease with creatinine of 2.2.    Interval History:     The patient once again states she is doing better today.  She denies any chest pain, shortness breath or palpitations.    History taken from: patient chart    Vital Signs  Temp:  [97.6 °F (36.4 °C)-98.8 °F (37.1 °C)] 98 °F (36.7  °C)  Heart Rate:  [78-95] 95  Resp:  [16-20] 16  BP: (116-148)/(75-89) 125/76    Physical Exam:     Physical Exam   Constitutional: She is oriented to person, place, and time. She appears well-developed and well-nourished.   Elderly and obese white female sitting comfortably on bed.   HENT:   Mouth/Throat: Oropharynx is clear and moist.   Eyes: EOM are normal. Pupils are equal, round, and reactive to light.   Neck: Neck supple. No JVD present. No tracheal deviation present. No thyromegaly present.   Cardiovascular: Normal rate, regular rhythm, S1 normal and S2 normal.  Exam reveals no gallop and no friction rub.    Murmur heard.   Harsh midsystolic murmur is present with a grade of 2/6  at the upper right sternal border radiating to the neck  Pulmonary/Chest: Effort normal. No respiratory distress. She has no wheezes. She has rales.   Abdominal: Soft. Bowel sounds are normal. She exhibits no mass. There is no tenderness.   Musculoskeletal: Normal range of motion. She exhibits edema (Mild bilateral lower extremity edema.).   Lymphadenopathy:     She has no cervical adenopathy.   Neurological: She is alert and oriented to person, place, and time.   Skin: Skin is warm and dry. No rash noted.   Psychiatric: She has a normal mood and affect.       Results Review:    I reviewed the patient's new clinical results.  I reviewed the patient's new imaging results and agree with the interpretation.  I reviewed the patient's other test results and agree with the interpretation  I personally viewed and interpreted the patient's EKG/Telemetry data    Medication:  Scheduled Meds:    aspirin 81 mg Oral Daily   carvedilol 12.5 mg Oral Q12H   citalopram 20 mg Oral Daily   furosemide 40 mg Oral Q12H   gabapentin 400 mg Oral Nightly   hydrALAZINE 75 mg Oral Q8H   isosorbide mononitrate 60 mg Oral Q24H   pantoprazole 40 mg Oral QAM   polyethylene glycol 17 g Oral Daily   spironolactone 25 mg Oral Daily   vitamin d 5,000 Units Oral Daily    warfarin 5 mg Oral Daily     Continuous Infusions:    Pharmacy to dose warfarin      PRN Meds:.HYDROcodone-acetaminophen  •  Pharmacy to dose warfarin  •  sodium chloride    Telemetry:Atrial fibrillation with paced rhythm in the 80s.      Assessment/Plan     1.  Congestive heart failure:  patient with congestive heart failure with an ejection fraction of 35-40% on last echocardiogram.  She was admitted due to volume overload which is likely related to heart failure exacerbation as well as renal failure.  She is currently undergoing diuresis.  She is on metoprolol and BiDil equivalent.    2.  Coronary artery disease: Patient with history of coronary artery disease currently on aspirin, beta blocker and isosorbide mononitrate.  She is not on a statin due to intolerance.   She reports no episodes of chest pain at this point.  We'll continue current regimen.     3.  Dyslipidemia: Patient with history of dyslipidemia for which she is currently on no medication due to statin intolerance.      4.  Hypertension: Patient with a history of hypertension that appears to be well-controlled on current regimen.     5.  Shortness of breath: Patient with history of shortness of breath but appears to be a combination of heart failure and pulmonary lung disease.     6.   Atrial fibrillation: Patient with a history of atrial fibrillation which is currently well rate controlled on metoprolol.  She is on Coumadin for stroke prevention.    Plan:    1.  Congestive heart failure: The patient's volume overload appears improved from yesterday.  Diuresis being controlled by nephrology.  Her renal function appears to have been stable.    2.  Coronary artery disease: At this point we will continue current regimen.  She has no evidence of an acute coronary event.    4.  Hypertension: The patient's blood pressure appears to be well-controlled on current regimen.  At this point would continue.    5.  Atrial fibrillation: At this point continue  beta blocker for rate control.  An INR is once again closer to goal at this point.  Her INR goal is 2-2.5.      Disposition: The patient appears to be significantly improved for discharge from a cardiology standpoint.  She is to follow-up with the cardiology clinic as scheduled.    Nba Arora MD  11/16/17  10:48 AM    Dragon disclaimer:  Much of this encounter note is an electronic transcription/translation of spoken language to printed text. The electronic translation of spoken language may permit erroneous, or at times, nonsensical words or phrases to be inadvertently transcribed; Although I have reviewed the note for such errors, some may still exist.

## 2017-11-16 NOTE — PROGRESS NOTES
Tre Kinney 84 y.o.   11/16/17    Subjective: Patient had a good day he ambulated well no shortness of breath and continues to improve physically  Objective: Vital signs stable no change physical exam still some edema in the legs  Vital Signs (last 72 hrs)       11/12 0700  -  11/13 0659 11/13 0700  -  11/14 0659 11/14 0700  -  11/15 0659 11/15 0700  -  11/16 0627   Most Recent    Temp (°F) 97.5 -  98    97.8 -  98.2    97.8 -  98.6    97.6 -  98.8     98 (36.7)    Heart Rate 77 -  92    72 -  89    79 -  82    78 -  95     95    Resp 18 -  20      20    18 -  20    16 -  20     16    /79 -  153/81    118/72 -  171/89    120/74 -  136/83    116/89 -  148/83     125/76    SpO2 (%) 94 -  97    96 -  99    94 -  99    94 -  99     98        Lab Results (last 72 hours)     Procedure Component Value Units Date/Time    BNP [[id number]]  (Abnormal) Collected:  11/13/17 1122    Specimen:  Blood Updated:  11/13/17 1213     BNP 1163.0 (H) pg/mL     Protime-INR [[id number]]  (Abnormal) Collected:  11/14/17 0036    Specimen:  Blood Updated:  11/14/17 0128     Protime 32.0 (H) Seconds       Note new Reference Range        INR 3.05 (H)    Narrative:       Suggested INR therapeutic range for stable oral anticoagulant therapy:    Low Intensity therapy:   1.5-2.0  Moderate Intensity therapy:   2.0-3.0  High Intensity therapy:   2.5-4.0    Basic Metabolic Panel [[id number]]  (Abnormal) Collected:  11/14/17 0810    Specimen:  Blood Updated:  11/14/17 0933     Glucose 95 mg/dL      BUN 43 (H) mg/dL      Creatinine 2.08 (H) mg/dL      Sodium 138 mmol/L      Potassium 3.9 mmol/L      Chloride 99 mmol/L      CO2 33.2 (H) mmol/L      Calcium 9.2 mg/dL      eGFR Non African Amer 23 (L) mL/min/1.73      BUN/Creatinine Ratio 20.7     Anion Gap 5.8 mmol/L     Narrative:       The MDRD GFR formula is only valid for adults with stable renal function between ages 18 and 70.    Osmolality, Calculated [[id number]]  (Normal) Collected:  11/14/17  0810    Specimen:  Blood Updated:  11/14/17 0933     Osmolality Calc 286.3 mOsm/kg     Protime-INR [[id number]]  (Abnormal) Collected:  11/15/17 0107    Specimen:  Blood Updated:  11/15/17 0623     Protime 33.2 (H) Seconds       Note new Reference Range        INR 3.19 (H)    Narrative:       Suggested INR therapeutic range for stable oral anticoagulant therapy:    Low Intensity therapy:   1.5-2.0  Moderate Intensity therapy:   2.0-3.0  High Intensity therapy:   2.5-4.0    Basic Metabolic Panel [[id number]]  (Abnormal) Collected:  11/15/17 0107    Specimen:  Blood Updated:  11/15/17 0624     Glucose 98 mg/dL      BUN 48 (H) mg/dL      Creatinine 2.02 (H) mg/dL      Sodium 138 mmol/L      Potassium 4.0 mmol/L      Chloride 99 mmol/L      CO2 34.6 (H) mmol/L      Calcium 9.0 mg/dL      eGFR Non African Amer 23 (L) mL/min/1.73      BUN/Creatinine Ratio 23.8     Anion Gap 4.4 mmol/L     Narrative:       The MDRD GFR formula is only valid for adults with stable renal function between ages 18 and 70.    Osmolality, Calculated [[id number]]  (Normal) Collected:  11/15/17 0107    Specimen:  Blood Updated:  11/15/17 0624     Osmolality Calc 288.3 mOsm/kg     Protime-INR [[id number]]  (Abnormal) Collected:  11/16/17 0447    Specimen:  Blood Updated:  11/16/17 0528     Protime 30.0 (H) Seconds       Note new Reference Range        INR 2.81 (H)    Narrative:       Suggested INR therapeutic range for stable oral anticoagulant therapy:    Low Intensity therapy:   1.5-2.0  Moderate Intensity therapy:   2.0-3.0  High Intensity therapy:   2.5-4.0    Basic Metabolic Panel [[id number]]  (Abnormal) Collected:  11/16/17 0447    Specimen:  Blood Updated:  11/16/17 0540     Glucose 91 mg/dL      BUN 52 (H) mg/dL      Creatinine 2.12 (H) mg/dL      Sodium 139 mmol/L      Potassium 3.9 mmol/L      Chloride 97 (L) mmol/L      CO2 35.9 (H) mmol/L      Calcium 9.3 mg/dL      eGFR Non African Amer 22 (L) mL/min/1.73      BUN/Creatinine Ratio 24.5      Anion Gap 6.1 mmol/L     Narrative:       The MDRD GFR formula is only valid for adults with stable renal function between ages 18 and 70.    Osmolality, Calculated [[id number]]  (Normal) Collected:  11/16/17 0447    Specimen:  Blood Updated:  11/16/17 0540     Osmolality Calc 291.2 mOsm/kg         Imaging Results (last 24 hours)     ** No results found for the last 24 hours. **        Assessment:Active Problems:    CHF (congestive heart failure)    Congestive heart failure     Plan:Doing well with the diuresis and told patient she has good day today and okay with renal and cardiology home tomorrow

## 2017-11-17 ENCOUNTER — EPISODE CHANGES (OUTPATIENT)
Dept: CASE MANAGEMENT | Facility: OTHER | Age: 82
End: 2017-11-17

## 2017-11-17 VITALS
HEART RATE: 79 BPM | DIASTOLIC BLOOD PRESSURE: 73 MMHG | BODY MASS INDEX: 30.19 KG/M2 | SYSTOLIC BLOOD PRESSURE: 135 MMHG | HEIGHT: 67 IN | WEIGHT: 192.38 LBS | TEMPERATURE: 97.6 F | RESPIRATION RATE: 18 BRPM | OXYGEN SATURATION: 99 %

## 2017-11-17 LAB
ANION GAP SERPL CALCULATED.3IONS-SCNC: 6.2 MMOL/L (ref 3.6–11.2)
BUN BLD-MCNC: 56 MG/DL (ref 7–21)
BUN/CREAT SERPL: 26.7 (ref 7–25)
CALCIUM SPEC-SCNC: 9 MG/DL (ref 7.7–10)
CHLORIDE SERPL-SCNC: 96 MMOL/L (ref 99–112)
CO2 SERPL-SCNC: 38.8 MMOL/L (ref 24.3–31.9)
CREAT BLD-MCNC: 2.1 MG/DL (ref 0.43–1.29)
GFR SERPL CREATININE-BSD FRML MDRD: 22 ML/MIN/1.73
GLUCOSE BLD-MCNC: 94 MG/DL (ref 70–110)
INR PPP: 2.73 (ref 0.9–1.1)
OSMOLALITY SERPL CALC.SUM OF ELEC: 296.5 MOSM/KG (ref 273–305)
POTASSIUM BLD-SCNC: 3.8 MMOL/L (ref 3.5–5.3)
PROTHROMBIN TIME: 29.3 SECONDS (ref 11–15.4)
SODIUM BLD-SCNC: 141 MMOL/L (ref 135–153)

## 2017-11-17 PROCEDURE — 85610 PROTHROMBIN TIME: CPT | Performed by: FAMILY MEDICINE

## 2017-11-17 PROCEDURE — 80048 BASIC METABOLIC PNL TOTAL CA: CPT | Performed by: INTERNAL MEDICINE

## 2017-11-17 PROCEDURE — 94799 UNLISTED PULMONARY SVC/PX: CPT

## 2017-11-17 RX ORDER — FUROSEMIDE 40 MG/1
40 TABLET ORAL EVERY 12 HOURS SCHEDULED
Qty: 30 TABLET | Refills: 2 | Status: SHIPPED | OUTPATIENT
Start: 2017-11-17 | End: 2017-11-28 | Stop reason: SDUPTHER

## 2017-11-17 RX ORDER — FUROSEMIDE 40 MG/1
40 TABLET ORAL DAILY
Status: DISCONTINUED | OUTPATIENT
Start: 2017-11-18 | End: 2017-11-17 | Stop reason: HOSPADM

## 2017-11-17 RX ORDER — SPIRONOLACTONE 25 MG/1
25 TABLET ORAL DAILY
Qty: 30 TABLET | Refills: 2 | Status: ON HOLD | OUTPATIENT
Start: 2017-11-17 | End: 2017-12-26

## 2017-11-17 RX ADMIN — CHOLECALCIFEROL CAP 125 MCG (5000 UNIT) 5000 UNITS: 125 CAP at 08:18

## 2017-11-17 RX ADMIN — CARVEDILOL 12.5 MG: 6.25 TABLET, FILM COATED ORAL at 08:18

## 2017-11-17 RX ADMIN — ASPIRIN 81 MG: 81 TABLET ORAL at 08:18

## 2017-11-17 RX ADMIN — HYDRALAZINE HYDROCHLORIDE 75 MG: 50 TABLET ORAL at 04:15

## 2017-11-17 RX ADMIN — CITALOPRAM HYDROBROMIDE 20 MG: 20 TABLET ORAL at 08:18

## 2017-11-17 RX ADMIN — SALINE NASAL SPRAY 2 SPRAY: 1.5 SOLUTION NASAL at 08:20

## 2017-11-17 RX ADMIN — ISOSORBIDE MONONITRATE 60 MG: 60 TABLET, EXTENDED RELEASE ORAL at 08:18

## 2017-11-17 RX ADMIN — HYDROCODONE BITARTRATE AND ACETAMINOPHEN 1 TABLET: 7.5; 325 TABLET ORAL at 08:17

## 2017-11-17 RX ADMIN — PANTOPRAZOLE SODIUM 40 MG: 40 TABLET, DELAYED RELEASE ORAL at 04:16

## 2017-11-17 RX ADMIN — POLYETHYLENE GLYCOL 3350 17 G: 17 POWDER, FOR SOLUTION ORAL at 08:17

## 2017-11-17 RX ADMIN — SPIRONOLACTONE 25 MG: 25 TABLET ORAL at 08:17

## 2017-11-17 RX ADMIN — FUROSEMIDE 40 MG: 40 TABLET ORAL at 08:17

## 2017-11-17 NOTE — PROGRESS NOTES
Nephrology  Note        Subjective       Breathing is back to baseline and noted plan for DC    Objective     Vital Signs  Temp:  [97.6 °F (36.4 °C)-98.3 °F (36.8 °C)] 97.6 °F (36.4 °C)  Heart Rate:  [72-89] 79  Resp:  [8-20] 18  BP: (127-135)/(66-80) 135/73       I/O last 3 completed shifts:  In: 1320 [P.O.:1320]  Out: 2251 [Urine:2250; Stool:1]    Physical Examination:    General Appearance : alert,  no distress  Head : normocephalic  Eyes :  no pallor  Throat : oral mucosa moist  Neck:  no JVD  Lungs : clear  Heart : regular rhythm & normal rate, normal S1, S2, no murmur   Abdomen :  normal bowel sounds,  and soft non-tender  Extremities :  trace edema -  Neurologic : grossly no focal deficitis    Laboratory Data :      WBC No results found for: WBC   HGB No results found for: HGB   HCT No results found for: HCT   Platlets No results found for: LABPLAT   MCV No results found for: MCV       Sodium Sodium   Date Value Ref Range Status   11/17/2017 141 135 - 153 mmol/L Final   11/16/2017 139 135 - 153 mmol/L Final   11/15/2017 138 135 - 153 mmol/L Final      Potassium Potassium   Date Value Ref Range Status   11/17/2017 3.8 3.5 - 5.3 mmol/L Final   11/16/2017 3.9 3.5 - 5.3 mmol/L Final   11/15/2017 4.0 3.5 - 5.3 mmol/L Final      Chloride Chloride   Date Value Ref Range Status   11/17/2017 96 (L) 99 - 112 mmol/L Final   11/16/2017 97 (L) 99 - 112 mmol/L Final   11/15/2017 99 99 - 112 mmol/L Final      CO2 CO2   Date Value Ref Range Status   11/17/2017 38.8 (H) 24.3 - 31.9 mmol/L Final   11/16/2017 35.9 (H) 24.3 - 31.9 mmol/L Final   11/15/2017 34.6 (H) 24.3 - 31.9 mmol/L Final      BUN BUN   Date Value Ref Range Status   11/17/2017 56 (H) 7 - 21 mg/dL Final   11/16/2017 52 (H) 7 - 21 mg/dL Final   11/15/2017 48 (H) 7 - 21 mg/dL Final      Creatinine Creatinine   Date Value Ref Range Status   11/17/2017 2.10 (H) 0.43 - 1.29 mg/dL Final   11/16/2017 2.12 (H) 0.43 - 1.29 mg/dL Final   11/15/2017 2.02 (H) 0.43 - 1.29  mg/dL Final      Calcium Calcium   Date Value Ref Range Status   11/17/2017 9.0 7.7 - 10.0 mg/dL Final   11/16/2017 9.3 7.7 - 10.0 mg/dL Final   11/15/2017 9.0 7.7 - 10.0 mg/dL Final      PO4 No results found for: CAPO4   Albumin No results found for: ALBUMIN   Magnesium No results found for: MG   Uric Acid No results found for: URICACID     Radiology results :     Imaging Results (last 72 hours)     ** No results found for the last 72 hours. **            Medications:        aspirin 81 mg Oral Daily   carvedilol 12.5 mg Oral Q12H   citalopram 20 mg Oral Daily   [START ON 11/18/2017] furosemide 40 mg Oral Daily   gabapentin 400 mg Oral Nightly   hydrALAZINE 75 mg Oral Q8H   isosorbide mononitrate 60 mg Oral Q24H   pantoprazole 40 mg Oral QAM   polyethylene glycol 17 g Oral Daily   spironolactone 25 mg Oral Daily   vitamin d 5,000 Units Oral Daily   warfarin 5 mg Oral Daily       Pharmacy to dose warfarin        Assessment/Plan     Active Problems:    CHF (congestive heart failure)    Congestive heart failure      1. 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 She check wt daily at home and I have educated her on how to take diuretics based on her weight    2. Systolic CHF exacerbation : resolved     3. HTN : keep -150s/80s for renal perfusion    4. CAD    Ok to dc from renal standpoint on lasix 40 mg po QD and metolazone 5 mg po PRN for weight gain of > 3 lbs  Fu 1 week  I discussed the patients findings and my recommendations with patient and nursing staff    Sebas Ellis MD  11/17/17  9:45 AM

## 2017-11-18 NOTE — DISCHARGE SUMMARY
Patient Identification:  Name:  Tre Kinney  Age:  84 y.o.  Sex:  female  :  1932  MRN:  [id number]  Visit Number:  [id number]    Date of Admission: 2017  Date of Discharge:  2017     PCP: Myles Barone MD    Discharge Diagnosis: acute on chronic systolic congestive heart failure/volume overload/chronic kidney disease/chronic atrial fibrillation    CONSULTS : cardiology and nephrology      PROCEDURES PERFORMED        HOSPITAL COURSE  Patient is a 84 y.o. female presented to [facility] complaining of complaining of increasing shortness of breath swelling and orthopnea.  She's had to sleep in a chair and has marked shortness of breath walk 10 feet.  She's admitted for IV diuresis along with monitoring of her chronic kidney disease.  Cardiology was consult did who adjusted her medications and her ejection fraction to be in the 35-40% range.over the course of her stay she continued to diurese she was feeling much better, discharge he is able to ambulate around the nurses station in the halls and appetite had picked up.  Her renal function remained stable from nephrology standpoint and she was discharged home to continue present medicines along with her Lasix and potassium follow back up the office in 1 week.also patient is in chronic atrophia she's currently on Coumadin and will also go ahead and change from metoprolol to Coreg for better blood pressure and heart failure control. ppatient was to continue on Lasix 40 mg twice a day along with potassium and stop the metolazone 1.2-1.3 L a day Please see the admitting history and physical for further details.      VITAL SIGNS:  Last 3 weights    11/15/17  0300 17  0319 17  0418   Weight: 208 lb (94.3 kg) 201 lb 12.8 oz (91.5 kg) 192 lb 6 oz (87.3 kg)     Body mass index is 30.13 kg/(m^2).        DISCHARGE DISPOSITION   Stable    DISCHARGE MEDICATIONS:   Tre Kinney   Home Medication Instructions ALVARO:[id number]     Printed on:11/18/17 9067   Medication Information                      aspirin 81 MG EC tablet  Take 1 tablet by mouth Daily.             Cholecalciferol (VITAMIN D3) 5000 units tablet tablet  Take 5,000 Units by mouth Daily.             citalopram (CELEXA) 20 MG tablet  Take 20 mg by mouth Daily.             furosemide (LASIX) 40 MG tablet  Take 1 tablet by mouth Every 12 (Twelve) Hours.             gabapentin (NEURONTIN) 400 MG capsule  Take 400 mg by mouth Every Night.             hydrALAZINE (APRESOLINE) 100 MG tablet  Take 1 tablet by mouth Every 8 (Eight) Hours.             HYDROcodone-acetaminophen (NORCO) 7.5-325 MG per tablet  Take 1 tablet by mouth 2 (Two) Times a Day As Needed for Moderate Pain .             isosorbide mononitrate (IMDUR) 60 MG 24 hr tablet  Take 60 mg by mouth Daily.             metoprolol tartrate (LOPRESSOR) 50 MG tablet  Take 50 mg by mouth 2 (Two) Times a Day.             pantoprazole (PROTONIX) 40 MG EC tablet  Take 40 mg by mouth Daily.             spironolactone (ALDACTONE) 25 MG tablet  Take 1 tablet by mouth Daily.             warfarin (COUMADIN) 4 MG tablet  Take 6 mg by mouth Daily.                   Your Scheduled Appointments     Nov 28, 2017  2:40 PM EST   Follow Up with Nba Arora MD   [facility] CARDIOLOG[facility] (--)    [address] [facility] [address] [phone]           Arrive 15 minutes prior to appointment.                  Follow-up Information     Follow up with Myles Barone MD .    Specialty:  Family Medicine    Contact information:    [address] [place] [id number]  [phone]               Myles Barone MD  11/18/17  1:57 PM

## 2017-11-28 ENCOUNTER — OFFICE VISIT (OUTPATIENT)
Dept: CARDIOLOGY | Facility: CLINIC | Age: 82
End: 2017-11-28

## 2017-11-28 VITALS
HEIGHT: 67 IN | RESPIRATION RATE: 18 BRPM | SYSTOLIC BLOOD PRESSURE: 136 MMHG | OXYGEN SATURATION: 93 % | BODY MASS INDEX: 31.71 KG/M2 | HEART RATE: 77 BPM | DIASTOLIC BLOOD PRESSURE: 83 MMHG | WEIGHT: 202 LBS

## 2017-11-28 DIAGNOSIS — E78.00 ELEVATED CHOLESTEROL: ICD-10-CM

## 2017-11-28 DIAGNOSIS — R07.2 PRECORDIAL PAIN: ICD-10-CM

## 2017-11-28 DIAGNOSIS — I50.21 ACUTE SYSTOLIC CONGESTIVE HEART FAILURE (HCC): Primary | ICD-10-CM

## 2017-11-28 DIAGNOSIS — R00.2 PALPITATIONS: ICD-10-CM

## 2017-11-28 DIAGNOSIS — R06.02 SOB (SHORTNESS OF BREATH): ICD-10-CM

## 2017-11-28 DIAGNOSIS — I25.10 ARTERIOSCLEROSIS OF CORONARY ARTERY: ICD-10-CM

## 2017-11-28 PROCEDURE — 99214 OFFICE O/P EST MOD 30 MIN: CPT | Performed by: INTERNAL MEDICINE

## 2017-11-28 RX ORDER — FUROSEMIDE 20 MG/1
60 TABLET ORAL 2 TIMES DAILY
Qty: 60 TABLET | Refills: 6 | Status: ON HOLD | OUTPATIENT
Start: 2017-11-28 | End: 2017-12-26

## 2017-12-08 ENCOUNTER — APPOINTMENT (OUTPATIENT)
Dept: NUCLEAR MEDICINE | Facility: HOSPITAL | Age: 82
End: 2017-12-08
Attending: INTERNAL MEDICINE

## 2017-12-08 ENCOUNTER — APPOINTMENT (OUTPATIENT)
Dept: CARDIOLOGY | Facility: HOSPITAL | Age: 82
End: 2017-12-08
Attending: INTERNAL MEDICINE

## 2017-12-19 ENCOUNTER — APPOINTMENT (OUTPATIENT)
Dept: NUCLEAR MEDICINE | Facility: HOSPITAL | Age: 82
End: 2017-12-19
Attending: INTERNAL MEDICINE

## 2017-12-19 ENCOUNTER — APPOINTMENT (OUTPATIENT)
Dept: CARDIOLOGY | Facility: HOSPITAL | Age: 82
End: 2017-12-19
Attending: INTERNAL MEDICINE

## 2017-12-26 ENCOUNTER — HOSPITAL ENCOUNTER (INPATIENT)
Facility: HOSPITAL | Age: 82
LOS: 10 days | Discharge: NURSING, REHAB OR LONG-TERM CARE | End: 2018-01-05
Attending: FAMILY MEDICINE | Admitting: FAMILY MEDICINE

## 2017-12-26 LAB
ANION GAP SERPL CALCULATED.3IONS-SCNC: 10 MMOL/L (ref 3.6–11.2)
BNP SERPL-MCNC: 1497 PG/ML (ref 0–100)
BUN BLD-MCNC: 46 MG/DL (ref 7–21)
BUN/CREAT SERPL: 19.2 (ref 7–25)
CALCIUM SPEC-SCNC: 8.8 MG/DL (ref 7.7–10)
CHLORIDE SERPL-SCNC: 102 MMOL/L (ref 99–112)
CO2 SERPL-SCNC: 25 MMOL/L (ref 24.3–31.9)
CREAT BLD-MCNC: 2.4 MG/DL (ref 0.43–1.29)
DEPRECATED RDW RBC AUTO: 48.4 FL (ref 37–54)
ERYTHROCYTE [DISTWIDTH] IN BLOOD BY AUTOMATED COUNT: 16.1 % (ref 11.5–14.5)
GFR SERPL CREATININE-BSD FRML MDRD: 19 ML/MIN/1.73
GLUCOSE BLD-MCNC: 95 MG/DL (ref 70–110)
HCT VFR BLD AUTO: 30.1 % (ref 37–47)
HGB BLD-MCNC: 9.4 G/DL (ref 12–16)
INR PPP: 2.75 (ref 0.9–1.1)
MCH RBC QN AUTO: 26.1 PG (ref 27–33)
MCHC RBC AUTO-ENTMCNC: 31.2 G/DL (ref 33–37)
MCV RBC AUTO: 83.6 FL (ref 80–94)
OSMOLALITY SERPL CALC.SUM OF ELEC: 285.5 MOSM/KG (ref 273–305)
PLATELET # BLD AUTO: 224 10*3/MM3 (ref 130–400)
PMV BLD AUTO: 9.9 FL (ref 6–10)
POTASSIUM BLD-SCNC: 4 MMOL/L (ref 3.5–5.3)
PROTHROMBIN TIME: 29.5 SECONDS (ref 11–15.4)
RBC # BLD AUTO: 3.6 10*6/MM3 (ref 4.2–5.4)
SODIUM BLD-SCNC: 137 MMOL/L (ref 135–153)
WBC NRBC COR # BLD: 5.92 10*3/MM3 (ref 4.5–12.5)

## 2017-12-26 PROCEDURE — 25010000002 FUROSEMIDE PER 20 MG: Performed by: FAMILY MEDICINE

## 2017-12-26 PROCEDURE — 85610 PROTHROMBIN TIME: CPT | Performed by: FAMILY MEDICINE

## 2017-12-26 PROCEDURE — 83880 ASSAY OF NATRIURETIC PEPTIDE: CPT | Performed by: FAMILY MEDICINE

## 2017-12-26 PROCEDURE — 80048 BASIC METABOLIC PNL TOTAL CA: CPT | Performed by: FAMILY MEDICINE

## 2017-12-26 PROCEDURE — 85027 COMPLETE CBC AUTOMATED: CPT | Performed by: FAMILY MEDICINE

## 2017-12-26 RX ORDER — PANTOPRAZOLE SODIUM 40 MG/1
40 TABLET, DELAYED RELEASE ORAL DAILY
Status: DISCONTINUED | OUTPATIENT
Start: 2017-12-27 | End: 2018-01-05 | Stop reason: HOSPADM

## 2017-12-26 RX ORDER — GABAPENTIN 400 MG/1
400 CAPSULE ORAL NIGHTLY
Status: DISCONTINUED | OUTPATIENT
Start: 2017-12-26 | End: 2018-01-05 | Stop reason: HOSPADM

## 2017-12-26 RX ORDER — ALPRAZOLAM 0.5 MG/1
0.5 TABLET ORAL NIGHTLY PRN
COMMUNITY
End: 2022-09-19

## 2017-12-26 RX ORDER — WARFARIN SODIUM 3 MG/1
6 TABLET ORAL
Status: DISCONTINUED | OUTPATIENT
Start: 2017-12-27 | End: 2018-01-04 | Stop reason: DRUGHIGH

## 2017-12-26 RX ORDER — POTASSIUM CHLORIDE 750 MG/1
10 CAPSULE, EXTENDED RELEASE ORAL 2 TIMES DAILY WITH MEALS
Status: DISCONTINUED | OUTPATIENT
Start: 2017-12-26 | End: 2017-12-26 | Stop reason: SDUPTHER

## 2017-12-26 RX ORDER — HYDROCODONE BITARTRATE AND ACETAMINOPHEN 7.5; 325 MG/1; MG/1
1 TABLET ORAL 2 TIMES DAILY PRN
Status: CANCELLED | OUTPATIENT
Start: 2017-12-26

## 2017-12-26 RX ORDER — FUROSEMIDE 40 MG/1
60 TABLET ORAL 2 TIMES DAILY
COMMUNITY
End: 2022-09-19

## 2017-12-26 RX ORDER — ISOSORBIDE MONONITRATE 60 MG/1
60 TABLET, EXTENDED RELEASE ORAL DAILY
Status: DISCONTINUED | OUTPATIENT
Start: 2017-12-27 | End: 2018-01-05 | Stop reason: HOSPADM

## 2017-12-26 RX ORDER — WARFARIN SODIUM 3 MG/1
6 TABLET ORAL
Status: CANCELLED | OUTPATIENT
Start: 2017-12-27

## 2017-12-26 RX ORDER — WARFARIN SODIUM 3 MG/1
6 TABLET ORAL
Status: CANCELLED | OUTPATIENT
Start: 2017-12-26

## 2017-12-26 RX ORDER — PANTOPRAZOLE SODIUM 40 MG/1
40 TABLET, DELAYED RELEASE ORAL DAILY
Status: CANCELLED | OUTPATIENT
Start: 2017-12-26

## 2017-12-26 RX ORDER — FUROSEMIDE 10 MG/ML
40 INJECTION INTRAMUSCULAR; INTRAVENOUS
Status: DISCONTINUED | OUTPATIENT
Start: 2017-12-26 | End: 2017-12-31

## 2017-12-26 RX ORDER — HYDRALAZINE HYDROCHLORIDE 50 MG/1
100 TABLET, FILM COATED ORAL EVERY 8 HOURS SCHEDULED
Status: DISCONTINUED | OUTPATIENT
Start: 2017-12-26 | End: 2017-12-27

## 2017-12-26 RX ORDER — GABAPENTIN 400 MG/1
400 CAPSULE ORAL NIGHTLY
Status: CANCELLED | OUTPATIENT
Start: 2017-12-26

## 2017-12-26 RX ORDER — ALPRAZOLAM 0.5 MG/1
0.5 TABLET ORAL NIGHTLY PRN
Status: CANCELLED | OUTPATIENT
Start: 2017-12-26

## 2017-12-26 RX ORDER — METOPROLOL TARTRATE 50 MG/1
50 TABLET, FILM COATED ORAL EVERY 12 HOURS SCHEDULED
Status: DISCONTINUED | OUTPATIENT
Start: 2017-12-26 | End: 2017-12-29

## 2017-12-26 RX ORDER — HYDROCODONE BITARTRATE AND ACETAMINOPHEN 7.5; 325 MG/1; MG/1
1 TABLET ORAL 2 TIMES DAILY PRN
Status: DISCONTINUED | OUTPATIENT
Start: 2017-12-26 | End: 2018-01-05 | Stop reason: HOSPADM

## 2017-12-26 RX ORDER — POTASSIUM CHLORIDE 750 MG/1
10 CAPSULE, EXTENDED RELEASE ORAL 2 TIMES DAILY WITH MEALS
Status: CANCELLED | OUTPATIENT
Start: 2017-12-26

## 2017-12-26 RX ORDER — CITALOPRAM 20 MG/1
20 TABLET ORAL DAILY
Status: CANCELLED | OUTPATIENT
Start: 2017-12-27

## 2017-12-26 RX ORDER — CITALOPRAM 20 MG/1
20 TABLET ORAL DAILY
Status: CANCELLED | OUTPATIENT
Start: 2017-12-26

## 2017-12-26 RX ORDER — POTASSIUM CHLORIDE 750 MG/1
10 CAPSULE, EXTENDED RELEASE ORAL 2 TIMES DAILY
COMMUNITY
End: 2023-01-13 | Stop reason: HOSPADM

## 2017-12-26 RX ORDER — ALPRAZOLAM 0.5 MG/1
0.5 TABLET ORAL NIGHTLY PRN
Status: DISCONTINUED | OUTPATIENT
Start: 2017-12-26 | End: 2018-01-05 | Stop reason: HOSPADM

## 2017-12-26 RX ORDER — ISOSORBIDE MONONITRATE 60 MG/1
60 TABLET, EXTENDED RELEASE ORAL DAILY
Status: CANCELLED | OUTPATIENT
Start: 2017-12-26

## 2017-12-26 RX ORDER — METOPROLOL TARTRATE 50 MG/1
50 TABLET, FILM COATED ORAL EVERY 12 HOURS SCHEDULED
Status: CANCELLED | OUTPATIENT
Start: 2017-12-26

## 2017-12-26 RX ORDER — CITALOPRAM 20 MG/1
20 TABLET ORAL DAILY
Status: DISCONTINUED | OUTPATIENT
Start: 2017-12-27 | End: 2018-01-05 | Stop reason: HOSPADM

## 2017-12-26 RX ORDER — HYDRALAZINE HYDROCHLORIDE 50 MG/1
100 TABLET, FILM COATED ORAL EVERY 8 HOURS SCHEDULED
Status: CANCELLED | OUTPATIENT
Start: 2017-12-26

## 2017-12-26 RX ORDER — POTASSIUM CHLORIDE 750 MG/1
10 TABLET, FILM COATED, EXTENDED RELEASE ORAL 2 TIMES DAILY WITH MEALS
Status: DISCONTINUED | OUTPATIENT
Start: 2017-12-27 | End: 2017-12-28

## 2017-12-26 RX ADMIN — HYDRALAZINE HYDROCHLORIDE 100 MG: 50 TABLET ORAL at 20:47

## 2017-12-26 RX ADMIN — FUROSEMIDE 40 MG: 10 INJECTION, SOLUTION INTRAMUSCULAR; INTRAVENOUS at 18:38

## 2017-12-26 RX ADMIN — METOPROLOL TARTRATE 50 MG: 50 TABLET, FILM COATED ORAL at 20:47

## 2017-12-26 RX ADMIN — ALPRAZOLAM 0.5 MG: 0.5 TABLET ORAL at 20:47

## 2017-12-26 RX ADMIN — GABAPENTIN 400 MG: 400 CAPSULE ORAL at 20:47

## 2017-12-26 RX ADMIN — POTASSIUM CHLORIDE 10 MEQ: 750 CAPSULE, EXTENDED RELEASE ORAL at 20:48

## 2017-12-27 ENCOUNTER — APPOINTMENT (OUTPATIENT)
Dept: GENERAL RADIOLOGY | Facility: HOSPITAL | Age: 82
End: 2017-12-27

## 2017-12-27 LAB
ANION GAP SERPL CALCULATED.3IONS-SCNC: 10.7 MMOL/L (ref 3.6–11.2)
BILIRUB UR QL STRIP: NEGATIVE
BUN BLD-MCNC: 39 MG/DL (ref 7–21)
BUN/CREAT SERPL: 15.2 (ref 7–25)
CALCIUM SPEC-SCNC: 8.8 MG/DL (ref 7.7–10)
CHLORIDE SERPL-SCNC: 104 MMOL/L (ref 99–112)
CLARITY UR: CLEAR
CO2 SERPL-SCNC: 21.3 MMOL/L (ref 24.3–31.9)
COLOR UR: YELLOW
CREAT BLD-MCNC: 2.56 MG/DL (ref 0.43–1.29)
CREAT UR-MCNC: 50.6 MG/DL
GFR SERPL CREATININE-BSD FRML MDRD: 18 ML/MIN/1.73
GLUCOSE BLD-MCNC: 104 MG/DL (ref 70–110)
GLUCOSE UR STRIP-MCNC: NEGATIVE MG/DL
HGB UR QL STRIP.AUTO: NEGATIVE
INR PPP: 2.71 (ref 0.9–1.1)
KETONES UR QL STRIP: NEGATIVE
LEUKOCYTE ESTERASE UR QL STRIP.AUTO: NEGATIVE
NITRITE UR QL STRIP: NEGATIVE
OSMOLALITY SERPL CALC.SUM OF ELEC: 281.7 MOSM/KG (ref 273–305)
PH UR STRIP.AUTO: <=5 [PH] (ref 5–8)
POTASSIUM BLD-SCNC: 4.2 MMOL/L (ref 3.5–5.3)
PROT UR QL STRIP: NEGATIVE
PROT UR-MCNC: 5 MG/DL
PROT/CREAT UR: 98.8 MG/G CREA (ref 0–200)
PROTHROMBIN TIME: 29.2 SECONDS (ref 11–15.4)
SODIUM BLD-SCNC: 136 MMOL/L (ref 135–153)
SP GR UR STRIP: 1.01 (ref 1–1.03)
UROBILINOGEN UR QL STRIP: NORMAL

## 2017-12-27 PROCEDURE — 85610 PROTHROMBIN TIME: CPT | Performed by: FAMILY MEDICINE

## 2017-12-27 PROCEDURE — 25010000002 FUROSEMIDE PER 20 MG: Performed by: FAMILY MEDICINE

## 2017-12-27 PROCEDURE — 71010 XR CHEST 1 VW: CPT | Performed by: RADIOLOGY

## 2017-12-27 PROCEDURE — 80048 BASIC METABOLIC PNL TOTAL CA: CPT | Performed by: INTERNAL MEDICINE

## 2017-12-27 PROCEDURE — 25010000002 FUROSEMIDE PER 20 MG: Performed by: INTERNAL MEDICINE

## 2017-12-27 PROCEDURE — 84156 ASSAY OF PROTEIN URINE: CPT | Performed by: INTERNAL MEDICINE

## 2017-12-27 PROCEDURE — 81003 URINALYSIS AUTO W/O SCOPE: CPT | Performed by: INTERNAL MEDICINE

## 2017-12-27 PROCEDURE — 82570 ASSAY OF URINE CREATININE: CPT | Performed by: INTERNAL MEDICINE

## 2017-12-27 PROCEDURE — 71010 HC CHEST PA OR AP: CPT

## 2017-12-27 RX ORDER — HYDRALAZINE HYDROCHLORIDE 25 MG/1
25 TABLET, FILM COATED ORAL EVERY 8 HOURS SCHEDULED
Status: DISCONTINUED | OUTPATIENT
Start: 2017-12-27 | End: 2017-12-27

## 2017-12-27 RX ORDER — HYDRALAZINE HYDROCHLORIDE 25 MG/1
25 TABLET, FILM COATED ORAL EVERY 12 HOURS SCHEDULED
Status: DISCONTINUED | OUTPATIENT
Start: 2017-12-27 | End: 2018-01-05 | Stop reason: HOSPADM

## 2017-12-27 RX ADMIN — POTASSIUM CHLORIDE 10 MEQ: 750 TABLET, FILM COATED, EXTENDED RELEASE ORAL at 09:00

## 2017-12-27 RX ADMIN — HYDROCODONE BITARTRATE AND ACETAMINOPHEN 1 TABLET: 7.5; 325 TABLET ORAL at 02:05

## 2017-12-27 RX ADMIN — METOPROLOL TARTRATE 50 MG: 50 TABLET, FILM COATED ORAL at 09:00

## 2017-12-27 RX ADMIN — GABAPENTIN 400 MG: 400 CAPSULE ORAL at 20:18

## 2017-12-27 RX ADMIN — ISOSORBIDE MONONITRATE 60 MG: 60 TABLET, EXTENDED RELEASE ORAL at 09:00

## 2017-12-27 RX ADMIN — METOPROLOL TARTRATE 50 MG: 50 TABLET, FILM COATED ORAL at 20:18

## 2017-12-27 RX ADMIN — CHOLECALCIFEROL CAP 125 MCG (5000 UNIT) 5000 UNITS: 125 CAP at 09:00

## 2017-12-27 RX ADMIN — FUROSEMIDE 40 MG: 10 INJECTION, SOLUTION INTRAMUSCULAR; INTRAVENOUS at 16:51

## 2017-12-27 RX ADMIN — PANTOPRAZOLE SODIUM 40 MG: 40 TABLET, DELAYED RELEASE ORAL at 09:00

## 2017-12-27 RX ADMIN — POTASSIUM CHLORIDE 10 MEQ: 750 TABLET, FILM COATED, EXTENDED RELEASE ORAL at 16:50

## 2017-12-27 RX ADMIN — ALPRAZOLAM 0.5 MG: 0.5 TABLET ORAL at 20:18

## 2017-12-27 RX ADMIN — HYDROCODONE BITARTRATE AND ACETAMINOPHEN 1 TABLET: 7.5; 325 TABLET ORAL at 16:56

## 2017-12-27 RX ADMIN — WARFARIN SODIUM 6 MG: 3 TABLET ORAL at 16:50

## 2017-12-27 RX ADMIN — CITALOPRAM HYDROBROMIDE 20 MG: 20 TABLET ORAL at 09:00

## 2017-12-27 RX ADMIN — HYDRALAZINE HYDROCHLORIDE 100 MG: 50 TABLET ORAL at 06:06

## 2017-12-27 RX ADMIN — FUROSEMIDE 40 MG: 10 INJECTION, SOLUTION INTRAMUSCULAR; INTRAVENOUS at 09:00

## 2017-12-27 RX ADMIN — HYDRALAZINE HYDROCHLORIDE 25 MG: 25 TABLET ORAL at 16:50

## 2017-12-28 LAB
INR PPP: 2.5 (ref 0.9–1.1)
PROTHROMBIN TIME: 27.4 SECONDS (ref 11–15.4)

## 2017-12-28 PROCEDURE — 99222 1ST HOSP IP/OBS MODERATE 55: CPT | Performed by: INTERNAL MEDICINE

## 2017-12-28 PROCEDURE — 85610 PROTHROMBIN TIME: CPT | Performed by: FAMILY MEDICINE

## 2017-12-28 PROCEDURE — 25010000002 FUROSEMIDE PER 20 MG: Performed by: INTERNAL MEDICINE

## 2017-12-28 RX ORDER — SPIRONOLACTONE 25 MG/1
25 TABLET ORAL DAILY
Status: DISCONTINUED | OUTPATIENT
Start: 2017-12-29 | End: 2018-01-05 | Stop reason: HOSPADM

## 2017-12-28 RX ORDER — ATORVASTATIN CALCIUM 40 MG/1
40 TABLET, FILM COATED ORAL NIGHTLY
Status: DISCONTINUED | OUTPATIENT
Start: 2017-12-28 | End: 2018-01-05 | Stop reason: HOSPADM

## 2017-12-28 RX ORDER — ASPIRIN 81 MG/1
81 TABLET ORAL DAILY
Status: DISCONTINUED | OUTPATIENT
Start: 2017-12-28 | End: 2018-01-05 | Stop reason: HOSPADM

## 2017-12-28 RX ADMIN — CHOLECALCIFEROL CAP 125 MCG (5000 UNIT) 5000 UNITS: 125 CAP at 08:05

## 2017-12-28 RX ADMIN — ISOSORBIDE MONONITRATE 60 MG: 60 TABLET, EXTENDED RELEASE ORAL at 08:05

## 2017-12-28 RX ADMIN — FUROSEMIDE 40 MG: 10 INJECTION, SOLUTION INTRAMUSCULAR; INTRAVENOUS at 17:32

## 2017-12-28 RX ADMIN — WARFARIN SODIUM 6 MG: 3 TABLET ORAL at 17:31

## 2017-12-28 RX ADMIN — POTASSIUM CHLORIDE 10 MEQ: 750 TABLET, FILM COATED, EXTENDED RELEASE ORAL at 17:32

## 2017-12-28 RX ADMIN — METOPROLOL TARTRATE 50 MG: 50 TABLET, FILM COATED ORAL at 20:40

## 2017-12-28 RX ADMIN — HYDRALAZINE HYDROCHLORIDE 25 MG: 25 TABLET ORAL at 08:05

## 2017-12-28 RX ADMIN — CITALOPRAM HYDROBROMIDE 20 MG: 20 TABLET ORAL at 08:05

## 2017-12-28 RX ADMIN — HYDROCODONE BITARTRATE AND ACETAMINOPHEN 1 TABLET: 7.5; 325 TABLET ORAL at 05:47

## 2017-12-28 RX ADMIN — POTASSIUM CHLORIDE 10 MEQ: 750 TABLET, FILM COATED, EXTENDED RELEASE ORAL at 08:05

## 2017-12-28 RX ADMIN — FUROSEMIDE 40 MG: 10 INJECTION, SOLUTION INTRAMUSCULAR; INTRAVENOUS at 08:12

## 2017-12-28 RX ADMIN — ATORVASTATIN CALCIUM 40 MG: 40 TABLET, FILM COATED ORAL at 20:40

## 2017-12-28 RX ADMIN — ASPIRIN 81 MG: 81 TABLET ORAL at 17:32

## 2017-12-28 RX ADMIN — GABAPENTIN 400 MG: 400 CAPSULE ORAL at 20:40

## 2017-12-28 RX ADMIN — PANTOPRAZOLE SODIUM 40 MG: 40 TABLET, DELAYED RELEASE ORAL at 08:05

## 2017-12-28 RX ADMIN — HYDRALAZINE HYDROCHLORIDE 25 MG: 25 TABLET ORAL at 20:40

## 2017-12-29 ENCOUNTER — APPOINTMENT (OUTPATIENT)
Dept: NUCLEAR MEDICINE | Facility: HOSPITAL | Age: 82
End: 2017-12-29

## 2017-12-29 ENCOUNTER — APPOINTMENT (OUTPATIENT)
Dept: CARDIOLOGY | Facility: HOSPITAL | Age: 82
End: 2017-12-29
Attending: INTERNAL MEDICINE

## 2017-12-29 ENCOUNTER — APPOINTMENT (OUTPATIENT)
Dept: CARDIOLOGY | Facility: HOSPITAL | Age: 82
End: 2017-12-29

## 2017-12-29 LAB
ANION GAP SERPL CALCULATED.3IONS-SCNC: 10.1 MMOL/L (ref 3.6–11.2)
BH CV ECHO MEAS - % IVS THICK: 18.9 %
BH CV ECHO MEAS - % LVPW THICK: 10.3 %
BH CV ECHO MEAS - BSA(HAYCOCK): 2 M^2
BH CV ECHO MEAS - BSA: 1.9 M^2
BH CV ECHO MEAS - BZI_BMI: 38.7 KILOGRAMS/M^2
BH CV ECHO MEAS - BZI_METRIC_HEIGHT: 152.4 CM
BH CV ECHO MEAS - BZI_METRIC_WEIGHT: 89.8 KG
BH CV ECHO MEAS - EDV(CUBED): 85.7 ML
BH CV ECHO MEAS - EDV(TEICH): 88.1 ML
BH CV ECHO MEAS - EF(CUBED): 60.1 %
BH CV ECHO MEAS - EF(TEICH): 51.9 %
BH CV ECHO MEAS - ESV(CUBED): 34.2 ML
BH CV ECHO MEAS - ESV(TEICH): 42.4 ML
BH CV ECHO MEAS - FS: 26.4 %
BH CV ECHO MEAS - IVS/LVPW: 1.3
BH CV ECHO MEAS - IVSD: 1.8 CM
BH CV ECHO MEAS - IVSS: 2.1 CM
BH CV ECHO MEAS - LV MASS(C)D: 295.4 GRAMS
BH CV ECHO MEAS - LV MASS(C)DI: 158.9 GRAMS/M^2
BH CV ECHO MEAS - LV MASS(C)S: 248.8 GRAMS
BH CV ECHO MEAS - LV MASS(C)SI: 133.8 GRAMS/M^2
BH CV ECHO MEAS - LVIDD: 4.4 CM
BH CV ECHO MEAS - LVIDS: 3.2 CM
BH CV ECHO MEAS - LVPWD: 1.4 CM
BH CV ECHO MEAS - LVPWS: 1.6 CM
BH CV ECHO MEAS - RVDD: 4 CM
BH CV ECHO MEAS - SI(CUBED): 27.7 ML/M^2
BH CV ECHO MEAS - SI(TEICH): 24.6 ML/M^2
BH CV ECHO MEAS - SV(CUBED): 51.5 ML
BH CV ECHO MEAS - SV(TEICH): 45.7 ML
BH CV NUCLEAR PRIOR STUDY: 3
BH CV STRESS BP STAGE 1: NORMAL
BH CV STRESS BP STAGE 2: NORMAL
BH CV STRESS COMMENTS STAGE 1: NORMAL
BH CV STRESS COMMENTS STAGE 2: NORMAL
BH CV STRESS DOSE REGADENOSON STAGE 1: 0.4
BH CV STRESS DURATION MIN STAGE 1: 0
BH CV STRESS DURATION MIN STAGE 2: 4
BH CV STRESS DURATION SEC STAGE 1: 15
BH CV STRESS DURATION SEC STAGE 2: 0
BH CV STRESS HR STAGE 1: 79
BH CV STRESS HR STAGE 2: 84
BH CV STRESS PROTOCOL 1: NORMAL
BH CV STRESS RECOVERY BP: NORMAL MMHG
BH CV STRESS RECOVERY HR: 80 BPM
BH CV STRESS STAGE 1: 1
BH CV STRESS STAGE 2: 2
BUN BLD-MCNC: 45 MG/DL (ref 7–21)
BUN/CREAT SERPL: 18.4 (ref 7–25)
CALCIUM SPEC-SCNC: 9.1 MG/DL (ref 7.7–10)
CHLORIDE SERPL-SCNC: 103 MMOL/L (ref 99–112)
CO2 SERPL-SCNC: 22.9 MMOL/L (ref 24.3–31.9)
CREAT BLD-MCNC: 2.45 MG/DL (ref 0.43–1.29)
FERRITIN SERPL-MCNC: 40 NG/ML (ref 10–290.3)
GFR SERPL CREATININE-BSD FRML MDRD: 19 ML/MIN/1.73
GLUCOSE BLD-MCNC: 99 MG/DL (ref 70–110)
INR PPP: 2.45 (ref 0.9–1.1)
IRON 24H UR-MRATE: 20 MCG/DL (ref 49–151)
IRON SATN MFR SERPL: 5 % (ref 15–50)
LV EF 2D ECHO EST: 50 %
LV EF NUC BP: 49 %
MAXIMAL PREDICTED HEART RATE: 135 BPM
MAXIMAL PREDICTED HEART RATE: 135 BPM
OSMOLALITY SERPL CALC.SUM OF ELEC: 283.5 MOSM/KG (ref 273–305)
PERCENT MAX PREDICTED HR: 62.22 %
POTASSIUM BLD-SCNC: 4 MMOL/L (ref 3.5–5.3)
PROTHROMBIN TIME: 26.9 SECONDS (ref 11–15.4)
SODIUM BLD-SCNC: 136 MMOL/L (ref 135–153)
STRESS BASELINE BP: NORMAL MMHG
STRESS BASELINE HR: 79 BPM
STRESS PERCENT HR: 73 %
STRESS POST PEAK BP: NORMAL MMHG
STRESS POST PEAK HR: 84 BPM
STRESS TARGET HR: 115 BPM
STRESS TARGET HR: 115 BPM
TIBC SERPL-MCNC: 392 MCG/DL (ref 241–421)

## 2017-12-29 PROCEDURE — A9500 TC99M SESTAMIBI: HCPCS | Performed by: FAMILY MEDICINE

## 2017-12-29 PROCEDURE — 80048 BASIC METABOLIC PNL TOTAL CA: CPT | Performed by: INTERNAL MEDICINE

## 2017-12-29 PROCEDURE — 82728 ASSAY OF FERRITIN: CPT | Performed by: INTERNAL MEDICINE

## 2017-12-29 PROCEDURE — 93306 TTE W/DOPPLER COMPLETE: CPT | Performed by: INTERNAL MEDICINE

## 2017-12-29 PROCEDURE — 0 TECHNETIUM SESTAMIBI: Performed by: FAMILY MEDICINE

## 2017-12-29 PROCEDURE — 93017 CV STRESS TEST TRACING ONLY: CPT

## 2017-12-29 PROCEDURE — 83540 ASSAY OF IRON: CPT | Performed by: INTERNAL MEDICINE

## 2017-12-29 PROCEDURE — 93018 CV STRESS TEST I&R ONLY: CPT | Performed by: INTERNAL MEDICINE

## 2017-12-29 PROCEDURE — 99232 SBSQ HOSP IP/OBS MODERATE 35: CPT | Performed by: INTERNAL MEDICINE

## 2017-12-29 PROCEDURE — 83550 IRON BINDING TEST: CPT | Performed by: INTERNAL MEDICINE

## 2017-12-29 PROCEDURE — 25010000002 REGADENOSON 0.4 MG/5ML SOLUTION: Performed by: FAMILY MEDICINE

## 2017-12-29 PROCEDURE — 25010000002 FUROSEMIDE PER 20 MG: Performed by: INTERNAL MEDICINE

## 2017-12-29 PROCEDURE — 93308 TTE F-UP OR LMTD: CPT

## 2017-12-29 PROCEDURE — 85610 PROTHROMBIN TIME: CPT | Performed by: FAMILY MEDICINE

## 2017-12-29 PROCEDURE — 78452 HT MUSCLE IMAGE SPECT MULT: CPT | Performed by: INTERNAL MEDICINE

## 2017-12-29 PROCEDURE — 25010000002 IRON SUCROSE PER 1 MG: Performed by: INTERNAL MEDICINE

## 2017-12-29 PROCEDURE — 78452 HT MUSCLE IMAGE SPECT MULT: CPT

## 2017-12-29 RX ORDER — METOPROLOL SUCCINATE 50 MG/1
100 TABLET, EXTENDED RELEASE ORAL
Status: DISCONTINUED | OUTPATIENT
Start: 2017-12-30 | End: 2018-01-05 | Stop reason: HOSPADM

## 2017-12-29 RX ADMIN — ATORVASTATIN CALCIUM 40 MG: 40 TABLET, FILM COATED ORAL at 20:21

## 2017-12-29 RX ADMIN — REGADENOSON 0.4 MG: 0.08 INJECTION, SOLUTION INTRAVENOUS at 10:05

## 2017-12-29 RX ADMIN — PANTOPRAZOLE SODIUM 40 MG: 40 TABLET, DELAYED RELEASE ORAL at 17:47

## 2017-12-29 RX ADMIN — ISOSORBIDE MONONITRATE 60 MG: 60 TABLET, EXTENDED RELEASE ORAL at 17:48

## 2017-12-29 RX ADMIN — IRON SUCROSE 300 MG: 20 INJECTION, SOLUTION INTRAVENOUS at 17:47

## 2017-12-29 RX ADMIN — ALPRAZOLAM 0.5 MG: 0.5 TABLET ORAL at 20:21

## 2017-12-29 RX ADMIN — WARFARIN SODIUM 6 MG: 3 TABLET ORAL at 17:47

## 2017-12-29 RX ADMIN — HYDROCODONE BITARTRATE AND ACETAMINOPHEN 1 TABLET: 7.5; 325 TABLET ORAL at 20:25

## 2017-12-29 RX ADMIN — FUROSEMIDE 40 MG: 10 INJECTION, SOLUTION INTRAMUSCULAR; INTRAVENOUS at 17:47

## 2017-12-29 RX ADMIN — SPIRONOLACTONE 25 MG: 25 TABLET ORAL at 17:47

## 2017-12-29 RX ADMIN — TECHNETIUM TC-99M SESTAMIBI 1 DOSE: 1 INJECTION INTRAVENOUS at 07:45

## 2017-12-29 RX ADMIN — CITALOPRAM HYDROBROMIDE 20 MG: 20 TABLET ORAL at 17:47

## 2017-12-29 RX ADMIN — CHLORASEPTIC 1 SPRAY: 1.5 LIQUID ORAL at 17:47

## 2017-12-29 RX ADMIN — CHOLECALCIFEROL CAP 125 MCG (5000 UNIT) 5000 UNITS: 125 CAP at 17:47

## 2017-12-29 RX ADMIN — TECHNETIUM TC-99M SESTAMIBI 1 DOSE: 1 INJECTION INTRAVENOUS at 10:05

## 2017-12-29 RX ADMIN — GABAPENTIN 400 MG: 400 CAPSULE ORAL at 20:21

## 2017-12-29 RX ADMIN — ASPIRIN 81 MG: 81 TABLET ORAL at 17:47

## 2017-12-29 RX ADMIN — HYDRALAZINE HYDROCHLORIDE 25 MG: 25 TABLET ORAL at 20:21

## 2017-12-30 LAB
ANION GAP SERPL CALCULATED.3IONS-SCNC: 7.6 MMOL/L (ref 3.6–11.2)
BUN BLD-MCNC: 48 MG/DL (ref 7–21)
BUN/CREAT SERPL: 21.1 (ref 7–25)
CALCIUM SPEC-SCNC: 8.6 MG/DL (ref 7.7–10)
CHLORIDE SERPL-SCNC: 102 MMOL/L (ref 99–112)
CO2 SERPL-SCNC: 27.4 MMOL/L (ref 24.3–31.9)
CREAT BLD-MCNC: 2.28 MG/DL (ref 0.43–1.29)
GFR SERPL CREATININE-BSD FRML MDRD: 20 ML/MIN/1.73
GLUCOSE BLD-MCNC: 87 MG/DL (ref 70–110)
INR PPP: 2.57 (ref 0.9–1.1)
OSMOLALITY SERPL CALC.SUM OF ELEC: 285.8 MOSM/KG (ref 273–305)
POTASSIUM BLD-SCNC: 4 MMOL/L (ref 3.5–5.3)
PROTHROMBIN TIME: 28 SECONDS (ref 11–15.4)
SODIUM BLD-SCNC: 137 MMOL/L (ref 135–153)

## 2017-12-30 PROCEDURE — 25010000002 FUROSEMIDE PER 20 MG: Performed by: INTERNAL MEDICINE

## 2017-12-30 PROCEDURE — 80048 BASIC METABOLIC PNL TOTAL CA: CPT | Performed by: INTERNAL MEDICINE

## 2017-12-30 PROCEDURE — 85610 PROTHROMBIN TIME: CPT | Performed by: FAMILY MEDICINE

## 2017-12-30 PROCEDURE — 99232 SBSQ HOSP IP/OBS MODERATE 35: CPT | Performed by: INTERNAL MEDICINE

## 2017-12-30 RX ADMIN — HYDRALAZINE HYDROCHLORIDE 25 MG: 25 TABLET ORAL at 20:27

## 2017-12-30 RX ADMIN — ATORVASTATIN CALCIUM 40 MG: 40 TABLET, FILM COATED ORAL at 20:27

## 2017-12-30 RX ADMIN — FUROSEMIDE 40 MG: 10 INJECTION, SOLUTION INTRAMUSCULAR; INTRAVENOUS at 17:11

## 2017-12-30 RX ADMIN — ASPIRIN 81 MG: 81 TABLET ORAL at 08:01

## 2017-12-30 RX ADMIN — CHOLECALCIFEROL CAP 125 MCG (5000 UNIT) 5000 UNITS: 125 CAP at 08:01

## 2017-12-30 RX ADMIN — FUROSEMIDE 40 MG: 10 INJECTION, SOLUTION INTRAMUSCULAR; INTRAVENOUS at 08:02

## 2017-12-30 RX ADMIN — WARFARIN SODIUM 6 MG: 3 TABLET ORAL at 17:11

## 2017-12-30 RX ADMIN — ISOSORBIDE MONONITRATE 60 MG: 60 TABLET, EXTENDED RELEASE ORAL at 08:01

## 2017-12-30 RX ADMIN — GABAPENTIN 400 MG: 400 CAPSULE ORAL at 20:26

## 2017-12-30 RX ADMIN — SPIRONOLACTONE 25 MG: 25 TABLET ORAL at 08:01

## 2017-12-30 RX ADMIN — ALPRAZOLAM 0.5 MG: 0.5 TABLET ORAL at 20:26

## 2017-12-30 RX ADMIN — HYDROCODONE BITARTRATE AND ACETAMINOPHEN 1 TABLET: 7.5; 325 TABLET ORAL at 20:26

## 2017-12-30 RX ADMIN — CITALOPRAM HYDROBROMIDE 20 MG: 20 TABLET ORAL at 08:01

## 2017-12-30 RX ADMIN — PANTOPRAZOLE SODIUM 40 MG: 40 TABLET, DELAYED RELEASE ORAL at 08:01

## 2017-12-30 RX ADMIN — METOPROLOL SUCCINATE 100 MG: 50 TABLET, FILM COATED, EXTENDED RELEASE ORAL at 08:01

## 2017-12-31 ENCOUNTER — APPOINTMENT (OUTPATIENT)
Dept: GENERAL RADIOLOGY | Facility: HOSPITAL | Age: 82
End: 2017-12-31

## 2017-12-31 LAB
ANION GAP SERPL CALCULATED.3IONS-SCNC: 7 MMOL/L (ref 3.6–11.2)
BNP SERPL-MCNC: 2173 PG/ML (ref 0–100)
BUN BLD-MCNC: 43 MG/DL (ref 7–21)
BUN/CREAT SERPL: 19.8 (ref 7–25)
CALCIUM SPEC-SCNC: 8.6 MG/DL (ref 7.7–10)
CHLORIDE SERPL-SCNC: 103 MMOL/L (ref 99–112)
CO2 SERPL-SCNC: 29 MMOL/L (ref 24.3–31.9)
CREAT BLD-MCNC: 2.17 MG/DL (ref 0.43–1.29)
GFR SERPL CREATININE-BSD FRML MDRD: 22 ML/MIN/1.73
GLUCOSE BLD-MCNC: 91 MG/DL (ref 70–110)
INR PPP: 2.52 (ref 0.9–1.1)
OSMOLALITY SERPL CALC.SUM OF ELEC: 288 MOSM/KG (ref 273–305)
POTASSIUM BLD-SCNC: 3.6 MMOL/L (ref 3.5–5.3)
PROTHROMBIN TIME: 27.5 SECONDS (ref 11–15.4)
SODIUM BLD-SCNC: 139 MMOL/L (ref 135–153)

## 2017-12-31 PROCEDURE — 71020 XR CHEST PA AND LATERAL: CPT | Performed by: RADIOLOGY

## 2017-12-31 PROCEDURE — 80048 BASIC METABOLIC PNL TOTAL CA: CPT | Performed by: INTERNAL MEDICINE

## 2017-12-31 PROCEDURE — 25010000002 FUROSEMIDE PER 20 MG: Performed by: INTERNAL MEDICINE

## 2017-12-31 PROCEDURE — 83880 ASSAY OF NATRIURETIC PEPTIDE: CPT | Performed by: FAMILY MEDICINE

## 2017-12-31 PROCEDURE — 71020 HC CHEST PA AND LATERAL: CPT

## 2017-12-31 PROCEDURE — 99232 SBSQ HOSP IP/OBS MODERATE 35: CPT | Performed by: INTERNAL MEDICINE

## 2017-12-31 PROCEDURE — 85610 PROTHROMBIN TIME: CPT | Performed by: FAMILY MEDICINE

## 2017-12-31 RX ORDER — FUROSEMIDE 40 MG/1
40 TABLET ORAL
Status: DISCONTINUED | OUTPATIENT
Start: 2017-12-31 | End: 2018-01-05

## 2017-12-31 RX ADMIN — CITALOPRAM HYDROBROMIDE 20 MG: 20 TABLET ORAL at 08:57

## 2017-12-31 RX ADMIN — METOPROLOL SUCCINATE 100 MG: 50 TABLET, FILM COATED, EXTENDED RELEASE ORAL at 08:57

## 2017-12-31 RX ADMIN — ALPRAZOLAM 0.5 MG: 0.5 TABLET ORAL at 21:09

## 2017-12-31 RX ADMIN — ISOSORBIDE MONONITRATE 60 MG: 60 TABLET, EXTENDED RELEASE ORAL at 08:57

## 2017-12-31 RX ADMIN — ASPIRIN 81 MG: 81 TABLET ORAL at 08:57

## 2017-12-31 RX ADMIN — ATORVASTATIN CALCIUM 40 MG: 40 TABLET, FILM COATED ORAL at 21:09

## 2017-12-31 RX ADMIN — HYDRALAZINE HYDROCHLORIDE 25 MG: 25 TABLET ORAL at 08:57

## 2017-12-31 RX ADMIN — PANTOPRAZOLE SODIUM 40 MG: 40 TABLET, DELAYED RELEASE ORAL at 08:57

## 2017-12-31 RX ADMIN — CHOLECALCIFEROL CAP 125 MCG (5000 UNIT) 5000 UNITS: 125 CAP at 08:57

## 2017-12-31 RX ADMIN — GABAPENTIN 400 MG: 400 CAPSULE ORAL at 21:09

## 2017-12-31 RX ADMIN — HYDRALAZINE HYDROCHLORIDE 25 MG: 25 TABLET ORAL at 21:09

## 2017-12-31 RX ADMIN — HYDROCODONE BITARTRATE AND ACETAMINOPHEN 1 TABLET: 7.5; 325 TABLET ORAL at 21:09

## 2017-12-31 RX ADMIN — WARFARIN SODIUM 6 MG: 3 TABLET ORAL at 16:20

## 2017-12-31 RX ADMIN — SPIRONOLACTONE 25 MG: 25 TABLET ORAL at 08:57

## 2017-12-31 RX ADMIN — FUROSEMIDE 40 MG: 40 TABLET ORAL at 12:53

## 2017-12-31 RX ADMIN — FUROSEMIDE 40 MG: 40 TABLET ORAL at 16:20

## 2017-12-31 RX ADMIN — FUROSEMIDE 40 MG: 10 INJECTION, SOLUTION INTRAMUSCULAR; INTRAVENOUS at 08:57

## 2017-12-31 RX ADMIN — CHLORASEPTIC 1 SPRAY: 1.5 LIQUID ORAL at 08:57

## 2017-12-31 RX ADMIN — HYDROCODONE BITARTRATE AND ACETAMINOPHEN 1 TABLET: 7.5; 325 TABLET ORAL at 08:57

## 2018-01-01 LAB
ANION GAP SERPL CALCULATED.3IONS-SCNC: 7.8 MMOL/L (ref 3.6–11.2)
BUN BLD-MCNC: 43 MG/DL (ref 7–21)
BUN/CREAT SERPL: 19 (ref 7–25)
CALCIUM SPEC-SCNC: 8.5 MG/DL (ref 7.7–10)
CHLORIDE SERPL-SCNC: 101 MMOL/L (ref 99–112)
CO2 SERPL-SCNC: 30.2 MMOL/L (ref 24.3–31.9)
CREAT BLD-MCNC: 2.26 MG/DL (ref 0.43–1.29)
GFR SERPL CREATININE-BSD FRML MDRD: 21 ML/MIN/1.73
GLUCOSE BLD-MCNC: 108 MG/DL (ref 70–110)
INR PPP: 2.93 (ref 0.9–1.1)
OSMOLALITY SERPL CALC.SUM OF ELEC: 288.9 MOSM/KG (ref 273–305)
POTASSIUM BLD-SCNC: 3.5 MMOL/L (ref 3.5–5.3)
PROTHROMBIN TIME: 31 SECONDS (ref 11–15.4)
SODIUM BLD-SCNC: 139 MMOL/L (ref 135–153)

## 2018-01-01 PROCEDURE — 99232 SBSQ HOSP IP/OBS MODERATE 35: CPT | Performed by: INTERNAL MEDICINE

## 2018-01-01 PROCEDURE — 85610 PROTHROMBIN TIME: CPT | Performed by: FAMILY MEDICINE

## 2018-01-01 PROCEDURE — 80048 BASIC METABOLIC PNL TOTAL CA: CPT | Performed by: INTERNAL MEDICINE

## 2018-01-01 PROCEDURE — 94799 UNLISTED PULMONARY SVC/PX: CPT

## 2018-01-01 PROCEDURE — 94640 AIRWAY INHALATION TREATMENT: CPT

## 2018-01-01 RX ORDER — IPRATROPIUM BROMIDE AND ALBUTEROL SULFATE 2.5; .5 MG/3ML; MG/3ML
3 SOLUTION RESPIRATORY (INHALATION)
Status: DISCONTINUED | OUTPATIENT
Start: 2018-01-01 | End: 2018-01-05 | Stop reason: HOSPADM

## 2018-01-01 RX ORDER — BUMETANIDE 0.25 MG/ML
1 INJECTION INTRAMUSCULAR; INTRAVENOUS ONCE
Status: COMPLETED | OUTPATIENT
Start: 2018-01-01 | End: 2018-01-01

## 2018-01-01 RX ADMIN — SPIRONOLACTONE 25 MG: 25 TABLET ORAL at 08:03

## 2018-01-01 RX ADMIN — HYDRALAZINE HYDROCHLORIDE 25 MG: 25 TABLET ORAL at 08:03

## 2018-01-01 RX ADMIN — HYDRALAZINE HYDROCHLORIDE 25 MG: 25 TABLET ORAL at 20:11

## 2018-01-01 RX ADMIN — IPRATROPIUM BROMIDE AND ALBUTEROL SULFATE 3 ML: .5; 3 SOLUTION RESPIRATORY (INHALATION) at 13:04

## 2018-01-01 RX ADMIN — ASPIRIN 81 MG: 81 TABLET ORAL at 08:04

## 2018-01-01 RX ADMIN — GABAPENTIN 400 MG: 400 CAPSULE ORAL at 20:10

## 2018-01-01 RX ADMIN — IPRATROPIUM BROMIDE AND ALBUTEROL SULFATE 3 ML: .5; 3 SOLUTION RESPIRATORY (INHALATION) at 18:15

## 2018-01-01 RX ADMIN — ISOSORBIDE MONONITRATE 60 MG: 60 TABLET, EXTENDED RELEASE ORAL at 08:04

## 2018-01-01 RX ADMIN — BUMETANIDE 1 MG: 0.25 INJECTION INTRAMUSCULAR; INTRAVENOUS at 21:32

## 2018-01-01 RX ADMIN — FUROSEMIDE 40 MG: 40 TABLET ORAL at 17:33

## 2018-01-01 RX ADMIN — FUROSEMIDE 40 MG: 40 TABLET ORAL at 08:03

## 2018-01-01 RX ADMIN — PANTOPRAZOLE SODIUM 40 MG: 40 TABLET, DELAYED RELEASE ORAL at 08:04

## 2018-01-01 RX ADMIN — ATORVASTATIN CALCIUM 40 MG: 40 TABLET, FILM COATED ORAL at 20:11

## 2018-01-01 RX ADMIN — CHOLECALCIFEROL CAP 125 MCG (5000 UNIT) 5000 UNITS: 125 CAP at 08:04

## 2018-01-01 RX ADMIN — METOPROLOL SUCCINATE 100 MG: 50 TABLET, FILM COATED, EXTENDED RELEASE ORAL at 08:03

## 2018-01-01 RX ADMIN — HYDROCODONE BITARTRATE AND ACETAMINOPHEN 1 TABLET: 7.5; 325 TABLET ORAL at 20:10

## 2018-01-01 RX ADMIN — CITALOPRAM HYDROBROMIDE 20 MG: 20 TABLET ORAL at 08:04

## 2018-01-01 RX ADMIN — WARFARIN SODIUM 6 MG: 3 TABLET ORAL at 17:33

## 2018-01-01 RX ADMIN — ALPRAZOLAM 0.5 MG: 0.5 TABLET ORAL at 20:10

## 2018-01-01 NOTE — PLAN OF CARE
Problem: Cardiac Output, Decreased (Adult)  Goal: Adequate Cardiac Output/Effective Tissue Perfusion  Outcome: Ongoing (interventions implemented as appropriate)      Problem: Cardiac: Heart Failure (Adult)  Goal: Signs and Symptoms of Listed Potential Problems Will be Absent or Manageable (Cardiac: Heart Failure)  Outcome: Ongoing (interventions implemented as appropriate)      Problem: Fall Risk (Adult)  Goal: Absence of Falls  Outcome: Ongoing (interventions implemented as appropriate)      Problem: Patient Care Overview (Adult)  Goal: Plan of Care Review  Outcome: Ongoing (interventions implemented as appropriate)    Goal: Adult Individualization and Mutuality  Outcome: Ongoing (interventions implemented as appropriate)    Goal: Discharge Needs Assessment  Outcome: Ongoing (interventions implemented as appropriate)

## 2018-01-01 NOTE — PLAN OF CARE
Problem: Cardiac: Heart Failure (Adult)  Goal: Signs and Symptoms of Listed Potential Problems Will be Absent or Manageable (Cardiac: Heart Failure)  Outcome: Ongoing (interventions implemented as appropriate)

## 2018-01-01 NOTE — PROGRESS NOTES
Interval History:     Patient Complaints: Patient feels better after we change the Lasix to by mouth and is making good urine output    Vital Signs  Temp:  [97.5 °F (36.4 °C)-99.9 °F (37.7 °C)] 97.5 °F (36.4 °C)  Heart Rate:  [74-80] 79  Resp:  [18-20] 18  BP: (125-133)/(61-79) 129/72    Physical Exam:    General:             No distress      HEENT:  No pallor                Neck:   JVD        Lungs:    Few crackles     Heart:   RRR ,no rub       Abdomen:     Normal bowel sounds, soft non-tender, non-distended, no guarding       Extremities:   Edema is much better Has wrinkles        Skin:   No petechiae, no rash       Neurologic:  Cr Ns grossly intact, sensation N, moves all extremities.        Results Review:     I reviewed the patient's new clinical results.    Lab Results (last 24 hours)     Procedure Component Value Units Date/Time    BNP [[id number]]  (Abnormal) Collected:  12/31/17 0942    Specimen:  Blood Updated:  12/31/17 1029     BNP 2173.0 (H) pg/mL     Protime-INR [[id number]]  (Abnormal) Collected:  01/01/18 0521    Specimen:  Blood Updated:  01/01/18 0624     Protime 31.0 (H) Seconds       Note new Reference Range        INR 2.93 (H)    Narrative:       Suggested INR therapeutic range for stable oral anticoagulant therapy:    Low Intensity therapy:   1.5-2.0  Moderate Intensity therapy:   2.0-3.0  High Intensity therapy:   2.5-4.0    Osmolality, Calculated [[id number]]  (Normal) Collected:  01/01/18 0521    Specimen:  Blood Updated:  01/01/18 0637     Osmolality Calc 288.9 mOsm/kg     Basic Metabolic Panel [[id number]]  (Abnormal) Collected:  01/01/18 0521    Specimen:  Blood Updated:  01/01/18 0637     Glucose 108 mg/dL      BUN 43 (H) mg/dL      Creatinine 2.26 (H) mg/dL      Sodium 139 mmol/L      Potassium 3.5 mmol/L      Chloride 101 mmol/L      CO2 30.2 mmol/L      Calcium 8.5 mg/dL      eGFR Non African Amer 21 (L) mL/min/1.73      BUN/Creatinine Ratio 19.0     Anion Gap 7.8 mmol/L      Narrative:       The MDRD GFR formula is only valid for adults with stable renal function between ages 18 and 70.          Imaging Results (last 24 hours)     Procedure Component Value Units Date/Time    XR Chest PA & Lateral [[id number]] Collected:  12/31/17 1850     Updated:  12/31/17 1852    Narrative:       XR CHEST PA AND LATERAL-      CLINICAL INDICATION: Cough.          COMPARISON: 12/27/2017.      TECHNIQUE: XR CHEST PA AND LATERAL-      FINDINGS:   Evidence of small left effusion.   Heart and mediastinal contours are unremarkable.   No pneumothorax.   Bony and soft tissue structures are unremarkable.            Impression:       Small left effusion.     This report was finalized on 12/31/2017 6:50 PM by Dr. Bob Lovell MD.             Assessment and Plan:    1. DIANA on CKD 4 : her baseline creatinine used to be below 2 but has been around 2 in last few months. 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 There is no proteinuria.  DIANA likely from diuresis and relative hypotension. Creatinine is better around 2.2 withLasix 40 mg by mouth twice a day the patient is feeling much better swelling is better so we'll continue that      2. Leg edema and systolic CHF :  Started the patient on Lasix 40 mg by mouth twice a day      3. HTN : keep -150/80s for her age      4. Moderate pulmonary HTN      5. Anemia:  Stools are pending got Venofer,  Start oral iron after at least one stool collected    DENNY Givens MD  01/01/18  8:47 AM

## 2018-01-01 NOTE — PROGRESS NOTES
LOS: 6 days   Patient Care Team:  Myles Barone MD as PCP - General  Myles Barone MD as PCP - Family Medicine  Kelli Márquez, RN as Care Coordinator (Population Health)      Subjective     Admission information:    The patient is an 85-year-old white female with a history of coronary artery disease, congestive heart failure, acute on chronic renal failure, gastroesophageal reflux disease, hypertension, dyslipidemia and previous history of DVTs.  According to the patient she started the current episode approximately one week ago.  She states she tried to put off coming to the hospital because of the holidays.  However her shortness of breath and lower extremity edema continued to worsen until she decided to come in 2 days ago.  She states that over that time she has been extremely short of breath and has had weeping lower extremity edema to thighs.  She also states she was previously having chest pain although she has not had any chest pain recently.  She is supposed to undergo a stress test and a Holter but she did not complete these tests.  She has been started on diuretics by nephrology but her renal function has continued to worsen.  Cardiology was consulted for further evaluation.    Interval History:     The patient is once again complaining of severe cough.  Having some episodes of hemoptysis.  Since her lower extremity edema is now resolved.    History taken from: patient chart    Vital Signs  Temp:  [97.5 °F (36.4 °C)-99.9 °F (37.7 °C)] 97.5 °F (36.4 °C)  Heart Rate:  [74-80] 79  Resp:  [18-20] 18  BP: (125-133)/(61-79) 129/72    Physical Exam:     Physical Exam   Constitutional: She is oriented to person, place, and time. She appears well-developed and well-nourished.   Obese white female lying comfortably on bed.   HENT:   Mouth/Throat: Oropharynx is clear and moist.   Eyes: EOM are normal. Pupils are equal, round, and reactive to light.   Neck: Neck supple. No JVD present. No tracheal  deviation present. No thyromegaly present.   Cardiovascular: Normal rate, regular rhythm, S1 normal and S2 normal.  Exam reveals no gallop and no friction rub.    No murmur heard.  Pulmonary/Chest: Effort normal. No respiratory distress. She has no wheezes. She has rales.   Abdominal: Soft. Bowel sounds are normal. She exhibits no mass. There is no tenderness.   Musculoskeletal: Normal range of motion. She exhibits no edema.   Lymphadenopathy:     She has no cervical adenopathy.   Neurological: She is alert and oriented to person, place, and time.   Skin: Skin is warm and dry. No rash noted.   Psychiatric: She has a normal mood and affect.       Results Review:     I reviewed the patient's new clinical results.  I reviewed the patient's new imaging results and agree with the interpretation.  I reviewed the patient's other test results and agree with the interpretation  I personally viewed and interpreted the patient's EKG/Telemetry data    Medication:  Scheduled Meds:    aspirin 81 mg Oral Daily   atorvastatin 40 mg Oral Nightly   citalopram 20 mg Oral Daily   furosemide 40 mg Oral BID   gabapentin 400 mg Oral Nightly   hydrALAZINE 25 mg Oral Q12H   ipratropium-albuterol 3 mL Nebulization 4x Daily - RT   isosorbide mononitrate 60 mg Oral Daily   metoprolol succinate  mg Oral Q24H   pantoprazole 40 mg Oral Daily   spironolactone 25 mg Oral Daily   vitamin d 5,000 Units Oral Daily   warfarin 6 mg Oral Daily     Continuous Infusions:   PRN Meds:.•  ALPRAZolam  •  HYDROcodone-acetaminophen  •  phenol    Telemetry: Ventricular pacing in the 80s.      Assessment/Plan     1.  Congestive heart failure: Patient with acute on chronic systolic congestive heart failure.  Her last echocardiogram showed an ejection fraction of 36-40%.  She is already on a beta blocker and ACE inhibitor.   She is not a candidate for an ACE inhibitor.  She is currently on BiDil equivalent with isosorbide mononitrate and hydralazine.  She is  undergoing diuresis with Lasix by nephrology.     2.  Acute on chronic renal failure: Patient with acute on chronic renal failure that is currently being managed by nephrology.     3.  ASCVD: Patient with a history of ASCVD previously on full therapy for coronary artery disease.  Most of the has been discontinued at this point.     4.  Dyslipidemia: Patient with history of dyslipidemia as well as to be beneficial adequate control.     5.  Chest pain: Patient with a previous history of chest pain concerning for worsening coronary artery disease she was supposed to undergo a stress test which she did not complete as an outpatient.    6.  Hypertension: Patient with a history of hypertension presented to be hypertensive today.  Her hydralazine dose was decreased by nephrology.  Will defer management of his condition to them.     Plan:     1.  Congestive heart failure:  The patient is currently on a beta blocker and BiDil equivalent.  She is also on spironolactone for diastolic heart failure.  She is tolerating diuresis well.  She has been switched to by mouth diuretics by nephrology. Even though her BNP has increased she appears clinically close to euvolemic.  At this point would continue by mouth diuretics and monitor renal function.    2.  ASCVD: This point will start the patient on low-dose aspirin, beta blocker and atorvastatin 40 mg by mouth daily.  She is not a candidate for an ACE inhibitor.  Would continue current regimen.    Nba Arora MD  01/01/18  10:24 AM    Dragon disclaimer:  Much of this encounter note is an electronic transcription/translation of spoken language to printed text. The electronic translation of spoken language may permit erroneous, or at times, nonsensical words or phrases to be inadvertently transcribed; Although I have reviewed the note for such errors, some may still exist.

## 2018-01-01 NOTE — PROGRESS NOTES
Tre Kinney 85 y.o.   01/01/18    Subjective: Patient states she still feels very weak continues to cough and has some audible wheezing.  Objective: Chest with bilateral wheezes/edema extremities  Vital Signs (last 72 hrs)       12/29 0700  -  12/30 0659 12/30 0700  -  12/31 0659 12/31 0700  -  01/01 0659 01/01 0700  -  01/01 0918   Most Recent    Temp (°F) 97.4 -  98.3    98 -  99.1    97.5 -  99.9       97.5 (36.4)    Heart Rate 78 -  80    78 -  81    74 -  80       79    Resp 18 -  20    18 -  20    18 -  20       18    /72 -  164/76    143/76 -  156/78    125/61 -  133/71       129/72    SpO2 (%) 94 -  98    94 -  97    92 -  98       98        Lab Results (last 72 hours)     Procedure Component Value Units Date/Time    Basic Metabolic Panel [[id number]]  (Abnormal) Collected:  12/29/17 1505    Specimen:  Blood Updated:  12/29/17 1540     Glucose 99 mg/dL      BUN 45 (H) mg/dL      Creatinine 2.45 (H) mg/dL      Sodium 136 mmol/L      Potassium 4.0 mmol/L       1+ Hemolysis         Chloride 103 mmol/L      CO2 22.9 (L) mmol/L      Calcium 9.1 mg/dL      eGFR Non African Amer 19 (L) mL/min/1.73      BUN/Creatinine Ratio 18.4     Anion Gap 10.1 mmol/L     Narrative:       The MDRD GFR formula is only valid for adults with stable renal function between ages 18 and 70.    Osmolality, Calculated [[id number]]  (Normal) Collected:  12/29/17 1505    Specimen:  Blood Updated:  12/29/17 1540     Osmolality Calc 283.5 mOsm/kg     Basic Metabolic Panel [[id number]]  (Abnormal) Collected:  12/30/17 0112    Specimen:  Blood Updated:  12/30/17 0239     Glucose 87 mg/dL      BUN 48 (H) mg/dL      Creatinine 2.28 (H) mg/dL      Sodium 137 mmol/L      Potassium 4.0 mmol/L       1+ Hemolysis         Chloride 102 mmol/L      CO2 27.4 mmol/L      Calcium 8.6 mg/dL      eGFR Non African Amer 20 (L) mL/min/1.73      BUN/Creatinine Ratio 21.1     Anion Gap 7.6 mmol/L     Narrative:       The MDRD GFR formula is only valid for  adults with stable renal function between ages 18 and 70.    Osmolality, Calculated [[id number]]  (Normal) Collected:  12/30/17 0112    Specimen:  Blood Updated:  12/30/17 0239     Osmolality Calc 285.8 mOsm/kg     Protime-INR [[id number]]  (Abnormal) Collected:  12/30/17 0112    Specimen:  Blood Updated:  12/30/17 0248     Protime 28.0 (H) Seconds       Note new Reference Range        INR 2.57 (H)    Narrative:       Suggested INR therapeutic range for stable oral anticoagulant therapy:    Low Intensity therapy:   1.5-2.0  Moderate Intensity therapy:   2.0-3.0  High Intensity therapy:   2.5-4.0    Protime-INR [[id number]]  (Abnormal) Collected:  12/31/17 0041    Specimen:  Blood Updated:  12/31/17 0112     Protime 27.5 (H) Seconds       Note new Reference Range        INR 2.52 (H)    Narrative:       Suggested INR therapeutic range for stable oral anticoagulant therapy:    Low Intensity therapy:   1.5-2.0  Moderate Intensity therapy:   2.0-3.0  High Intensity therapy:   2.5-4.0    Basic Metabolic Panel [[id number]]  (Abnormal) Collected:  12/31/17 0041    Specimen:  Blood Updated:  12/31/17 0128     Glucose 91 mg/dL      BUN 43 (H) mg/dL      Creatinine 2.17 (H) mg/dL      Sodium 139 mmol/L      Potassium 3.6 mmol/L      Chloride 103 mmol/L      CO2 29.0 mmol/L      Calcium 8.6 mg/dL      eGFR Non African Amer 22 (L) mL/min/1.73      BUN/Creatinine Ratio 19.8     Anion Gap 7.0 mmol/L     Narrative:       The MDRD GFR formula is only valid for adults with stable renal function between ages 18 and 70.    Osmolality, Calculated [[id number]]  (Normal) Collected:  12/31/17 0041    Specimen:  Blood Updated:  12/31/17 0128     Osmolality Calc 288.0 mOsm/kg     BNP [[id number]]  (Abnormal) Collected:  12/31/17 0942    Specimen:  Blood Updated:  12/31/17 1029     BNP 2173.0 (H) pg/mL     Protime-INR [[id number]]  (Abnormal) Collected:  01/01/18 0521    Specimen:  Blood Updated:  01/01/18 0624     Protime 31.0 (H) Seconds        Note new Reference Range        INR 2.93 (H)    Narrative:       Suggested INR therapeutic range for stable oral anticoagulant therapy:    Low Intensity therapy:   1.5-2.0  Moderate Intensity therapy:   2.0-3.0  High Intensity therapy:   2.5-4.0    Osmolality, Calculated [[id number]]  (Normal) Collected:  01/01/18 0521    Specimen:  Blood Updated:  01/01/18 0637     Osmolality Calc 288.9 mOsm/kg     Basic Metabolic Panel [[id number]]  (Abnormal) Collected:  01/01/18 0521    Specimen:  Blood Updated:  01/01/18 0637     Glucose 108 mg/dL      BUN 43 (H) mg/dL      Creatinine 2.26 (H) mg/dL      Sodium 139 mmol/L      Potassium 3.5 mmol/L      Chloride 101 mmol/L      CO2 30.2 mmol/L      Calcium 8.5 mg/dL      eGFR Non African Amer 21 (L) mL/min/1.73      BUN/Creatinine Ratio 19.0     Anion Gap 7.8 mmol/L     Narrative:       The MDRD GFR formula is only valid for adults with stable renal function between ages 18 and 70.        Imaging Results (last 24 hours)     Procedure Component Value Units Date/Time    XR Chest PA & Lateral [[id number]] Collected:  12/31/17 1850     Updated:  12/31/17 1852    Narrative:       XR CHEST PA AND LATERAL-      CLINICAL INDICATION: Cough.          COMPARISON: 12/27/2017.      TECHNIQUE: XR CHEST PA AND LATERAL-      FINDINGS:   Evidence of small left effusion.   Heart and mediastinal contours are unremarkable.   No pneumothorax.   Bony and soft tissue structures are unremarkable.            Impression:       Small left effusion.     This report was finalized on 12/31/2017 6:50 PM by Dr. Bob Lovell MD.           Assessment:Active Problems:    CHF (congestive heart failure)     Plan:We'll set up in bed and start patient on albuterol treatments continue with diuresis

## 2018-01-02 ENCOUNTER — APPOINTMENT (OUTPATIENT)
Dept: GENERAL RADIOLOGY | Facility: HOSPITAL | Age: 83
End: 2018-01-02

## 2018-01-02 LAB
A-A DO2: 120.4 MMHG (ref 0–300)
ALBUMIN SERPL-MCNC: 3.7 G/DL (ref 3.4–4.8)
ALBUMIN/GLOB SERPL: 1.3 G/DL (ref 1.5–2.5)
ALP SERPL-CCNC: 53 U/L (ref 35–104)
ALT SERPL W P-5'-P-CCNC: 14 U/L (ref 10–36)
ANION GAP SERPL CALCULATED.3IONS-SCNC: 11 MMOL/L (ref 3.6–11.2)
ANION GAP SERPL CALCULATED.3IONS-SCNC: 11 MMOL/L (ref 3.6–11.2)
ARTERIAL PATENCY WRIST A: POSITIVE
AST SERPL-CCNC: 22 U/L (ref 10–30)
ATMOSPHERIC PRESS: 739 MMHG
BASE EXCESS BLDA CALC-SCNC: 4.7 MMOL/L
BASOPHILS # BLD AUTO: 0.01 10*3/MM3 (ref 0–0.3)
BASOPHILS NFR BLD AUTO: 0.1 % (ref 0–2)
BDY SITE: ABNORMAL
BILIRUB SERPL-MCNC: 0.7 MG/DL (ref 0.2–1.8)
BODY TEMPERATURE: 98.6 C
BUN BLD-MCNC: 43 MG/DL (ref 7–21)
BUN BLD-MCNC: 43 MG/DL (ref 7–21)
BUN/CREAT SERPL: 18.8 (ref 7–25)
BUN/CREAT SERPL: 18.8 (ref 7–25)
CALCIUM SPEC-SCNC: 8.8 MG/DL (ref 7.7–10)
CALCIUM SPEC-SCNC: 8.8 MG/DL (ref 7.7–10)
CHLORIDE SERPL-SCNC: 95 MMOL/L (ref 99–112)
CHLORIDE SERPL-SCNC: 95 MMOL/L (ref 99–112)
CO2 SERPL-SCNC: 30 MMOL/L (ref 24.3–31.9)
CO2 SERPL-SCNC: 30 MMOL/L (ref 24.3–31.9)
COHGB MFR BLD: 1.4 % (ref 0–5)
CREAT BLD-MCNC: 2.29 MG/DL (ref 0.43–1.29)
CREAT BLD-MCNC: 2.29 MG/DL (ref 0.43–1.29)
DEPRECATED RDW RBC AUTO: 49.9 FL (ref 37–54)
EOSINOPHIL # BLD AUTO: 0.01 10*3/MM3 (ref 0–0.7)
EOSINOPHIL NFR BLD AUTO: 0.1 % (ref 0–7)
ERYTHROCYTE [DISTWIDTH] IN BLOOD BY AUTOMATED COUNT: 16.5 % (ref 11.5–14.5)
GFR SERPL CREATININE-BSD FRML MDRD: 20 ML/MIN/1.73
GFR SERPL CREATININE-BSD FRML MDRD: 20 ML/MIN/1.73
GLOBULIN UR ELPH-MCNC: 2.9 GM/DL
GLUCOSE BLD-MCNC: 121 MG/DL (ref 70–110)
GLUCOSE BLD-MCNC: 121 MG/DL (ref 70–110)
HCO3 BLDA-SCNC: 28.7 MMOL/L (ref 22–26)
HCT VFR BLD AUTO: 32.5 % (ref 37–47)
HCT VFR BLD CALC: 29 % (ref 37–47)
HGB BLD-MCNC: 10.2 G/DL (ref 12–16)
HGB BLDA-MCNC: 10 G/DL (ref 12–16)
HOROWITZ INDEX BLD+IHG-RTO: 32 %
IMM GRANULOCYTES # BLD: 0.01 10*3/MM3 (ref 0–0.03)
IMM GRANULOCYTES NFR BLD: 0.1 % (ref 0–0.5)
INR PPP: 2.93 (ref 0.9–1.1)
LYMPHOCYTES # BLD AUTO: 0.44 10*3/MM3 (ref 1–3)
LYMPHOCYTES NFR BLD AUTO: 6.2 % (ref 16–46)
MAGNESIUM SERPL-MCNC: 2 MG/DL (ref 1.7–2.6)
MCH RBC QN AUTO: 26.2 PG (ref 27–33)
MCHC RBC AUTO-ENTMCNC: 31.4 G/DL (ref 33–37)
MCV RBC AUTO: 83.5 FL (ref 80–94)
METHGB BLD QL: 0.2 % (ref 0–3)
MODALITY: ABNORMAL
MONOCYTES # BLD AUTO: 0.38 10*3/MM3 (ref 0.1–0.9)
MONOCYTES NFR BLD AUTO: 5.4 % (ref 0–12)
NEUTROPHILS # BLD AUTO: 6.24 10*3/MM3 (ref 1.4–6.5)
NEUTROPHILS NFR BLD AUTO: 88.1 % (ref 40–75)
OSMOLALITY SERPL CALC.SUM OF ELEC: 284 MOSM/KG (ref 273–305)
OXYHGB MFR BLDV: 86.8 % (ref 85–100)
PCO2 BLDA: 40.4 MM HG (ref 35–45)
PH BLDA: 7.47 PH UNITS (ref 7.35–7.45)
PLATELET # BLD AUTO: 150 10*3/MM3 (ref 130–400)
PMV BLD AUTO: 10.5 FL (ref 6–10)
PO2 BLDA: 53.8 MM HG (ref 80–100)
POTASSIUM BLD-SCNC: 3.8 MMOL/L (ref 3.5–5.3)
POTASSIUM BLD-SCNC: 3.8 MMOL/L (ref 3.5–5.3)
PROT SERPL-MCNC: 6.6 G/DL (ref 6–8)
PROTHROMBIN TIME: 31 SECONDS (ref 11–15.4)
RBC # BLD AUTO: 3.89 10*6/MM3 (ref 4.2–5.4)
SAO2 % BLDCOA: 88.2 % (ref 90–100)
SODIUM BLD-SCNC: 136 MMOL/L (ref 135–153)
SODIUM BLD-SCNC: 136 MMOL/L (ref 135–153)
WBC NRBC COR # BLD: 7.09 10*3/MM3 (ref 4.5–12.5)

## 2018-01-02 PROCEDURE — 82375 ASSAY CARBOXYHB QUANT: CPT | Performed by: INTERNAL MEDICINE

## 2018-01-02 PROCEDURE — 83050 HGB METHEMOGLOBIN QUAN: CPT | Performed by: INTERNAL MEDICINE

## 2018-01-02 PROCEDURE — G8978 MOBILITY CURRENT STATUS: HCPCS

## 2018-01-02 PROCEDURE — 36600 WITHDRAWAL OF ARTERIAL BLOOD: CPT | Performed by: INTERNAL MEDICINE

## 2018-01-02 PROCEDURE — 87077 CULTURE AEROBIC IDENTIFY: CPT | Performed by: INTERNAL MEDICINE

## 2018-01-02 PROCEDURE — 99232 SBSQ HOSP IP/OBS MODERATE 35: CPT | Performed by: INTERNAL MEDICINE

## 2018-01-02 PROCEDURE — 85025 COMPLETE CBC W/AUTO DIFF WBC: CPT | Performed by: INTERNAL MEDICINE

## 2018-01-02 PROCEDURE — 85610 PROTHROMBIN TIME: CPT | Performed by: FAMILY MEDICINE

## 2018-01-02 PROCEDURE — 97162 PT EVAL MOD COMPLEX 30 MIN: CPT

## 2018-01-02 PROCEDURE — 97530 THERAPEUTIC ACTIVITIES: CPT

## 2018-01-02 PROCEDURE — 87086 URINE CULTURE/COLONY COUNT: CPT | Performed by: INTERNAL MEDICINE

## 2018-01-02 PROCEDURE — 87186 SC STD MICRODIL/AGAR DIL: CPT | Performed by: INTERNAL MEDICINE

## 2018-01-02 PROCEDURE — 71045 X-RAY EXAM CHEST 1 VIEW: CPT

## 2018-01-02 PROCEDURE — 87147 CULTURE TYPE IMMUNOLOGIC: CPT | Performed by: INTERNAL MEDICINE

## 2018-01-02 PROCEDURE — 82805 BLOOD GASES W/O2 SATURATION: CPT | Performed by: INTERNAL MEDICINE

## 2018-01-02 PROCEDURE — 80053 COMPREHEN METABOLIC PANEL: CPT | Performed by: INTERNAL MEDICINE

## 2018-01-02 PROCEDURE — 97110 THERAPEUTIC EXERCISES: CPT

## 2018-01-02 PROCEDURE — G8979 MOBILITY GOAL STATUS: HCPCS

## 2018-01-02 PROCEDURE — 71045 X-RAY EXAM CHEST 1 VIEW: CPT | Performed by: RADIOLOGY

## 2018-01-02 PROCEDURE — 83735 ASSAY OF MAGNESIUM: CPT | Performed by: INTERNAL MEDICINE

## 2018-01-02 PROCEDURE — 94799 UNLISTED PULMONARY SVC/PX: CPT

## 2018-01-02 RX ADMIN — HYDRALAZINE HYDROCHLORIDE 25 MG: 25 TABLET ORAL at 09:32

## 2018-01-02 RX ADMIN — FUROSEMIDE 40 MG: 40 TABLET ORAL at 07:53

## 2018-01-02 RX ADMIN — ATORVASTATIN CALCIUM 40 MG: 40 TABLET, FILM COATED ORAL at 20:31

## 2018-01-02 RX ADMIN — FUROSEMIDE 40 MG: 40 TABLET ORAL at 16:29

## 2018-01-02 RX ADMIN — CITALOPRAM HYDROBROMIDE 20 MG: 20 TABLET ORAL at 07:53

## 2018-01-02 RX ADMIN — POTASSIUM CHLORIDE 30 MEQ: 1500 TABLET, EXTENDED RELEASE ORAL at 15:14

## 2018-01-02 RX ADMIN — METOPROLOL SUCCINATE 100 MG: 50 TABLET, FILM COATED, EXTENDED RELEASE ORAL at 07:52

## 2018-01-02 RX ADMIN — IPRATROPIUM BROMIDE AND ALBUTEROL SULFATE 3 ML: .5; 3 SOLUTION RESPIRATORY (INHALATION) at 06:29

## 2018-01-02 RX ADMIN — IPRATROPIUM BROMIDE AND ALBUTEROL SULFATE 3 ML: .5; 3 SOLUTION RESPIRATORY (INHALATION) at 00:45

## 2018-01-02 RX ADMIN — WARFARIN SODIUM 6 MG: 3 TABLET ORAL at 16:29

## 2018-01-02 RX ADMIN — GABAPENTIN 400 MG: 400 CAPSULE ORAL at 20:32

## 2018-01-02 RX ADMIN — SPIRONOLACTONE 25 MG: 25 TABLET ORAL at 07:52

## 2018-01-02 RX ADMIN — IPRATROPIUM BROMIDE AND ALBUTEROL SULFATE 3 ML: .5; 3 SOLUTION RESPIRATORY (INHALATION) at 13:16

## 2018-01-02 RX ADMIN — HYDROCODONE BITARTRATE AND ACETAMINOPHEN 1 TABLET: 7.5; 325 TABLET ORAL at 20:32

## 2018-01-02 RX ADMIN — IPRATROPIUM BROMIDE AND ALBUTEROL SULFATE 3 ML: .5; 3 SOLUTION RESPIRATORY (INHALATION) at 18:40

## 2018-01-02 RX ADMIN — ASPIRIN 81 MG: 81 TABLET ORAL at 07:52

## 2018-01-02 RX ADMIN — ISOSORBIDE MONONITRATE 60 MG: 60 TABLET, EXTENDED RELEASE ORAL at 07:53

## 2018-01-02 RX ADMIN — HYDRALAZINE HYDROCHLORIDE 25 MG: 25 TABLET ORAL at 20:31

## 2018-01-02 RX ADMIN — CHOLECALCIFEROL CAP 125 MCG (5000 UNIT) 5000 UNITS: 125 CAP at 07:53

## 2018-01-02 RX ADMIN — PANTOPRAZOLE SODIUM 40 MG: 40 TABLET, DELAYED RELEASE ORAL at 07:53

## 2018-01-02 NOTE — PROGRESS NOTES
Tre Kinney 85 y.o.   01/02/18    Subjective: Patient had increasing dyspnea last night and gave a dose of Bumex with good results.  Her BMP is been in the 3000 range and she's also had problems.    8.  Objective: STABLE PATIENT ALERT HAS NO COMPLAINTS OTHER THE CHANGES STATES SHE DOES NOT FEEL WELL  Vital Signs (last 72 hrs)       12/29 0700  -  12/30 0659 12/30 0700  -  12/31 0659 12/31 0700  -  01/01 0659 01/01 0700  -  01/02 0628   Most Recent    Temp (°F) 97.4 -  98.3    98 -  99.1    97.5 -  99.9    98.3 -  99.1     98.5 (36.9)    Heart Rate 78 -  80    78 -  81    74 -  80    74 -  98     98    Resp 18 -  20    18 -  20    18 -  20    18 -  20     18    /72 -  164/76    143/76 -  156/78    125/61 -  133/71    139/73 -  153/83     153/83    SpO2 (%) 94 -  98    94 -  97    92 -  98    91 -  97     95        Lab Results (last 72 hours)     Procedure Component Value Units Date/Time    Protime-INR [[id number]]  (Abnormal) Collected:  12/31/17 0041    Specimen:  Blood Updated:  12/31/17 0112     Protime 27.5 (H) Seconds       Note new Reference Range        INR 2.52 (H)    Narrative:       Suggested INR therapeutic range for stable oral anticoagulant therapy:    Low Intensity therapy:   1.5-2.0  Moderate Intensity therapy:   2.0-3.0  High Intensity therapy:   2.5-4.0    Basic Metabolic Panel [[id number]]  (Abnormal) Collected:  12/31/17 0041    Specimen:  Blood Updated:  12/31/17 0128     Glucose 91 mg/dL      BUN 43 (H) mg/dL      Creatinine 2.17 (H) mg/dL      Sodium 139 mmol/L      Potassium 3.6 mmol/L      Chloride 103 mmol/L      CO2 29.0 mmol/L      Calcium 8.6 mg/dL      eGFR Non African Amer 22 (L) mL/min/1.73      BUN/Creatinine Ratio 19.8     Anion Gap 7.0 mmol/L     Narrative:       The MDRD GFR formula is only valid for adults with stable renal function between ages 18 and 70.    Osmolality, Calculated [[id number]]  (Normal) Collected:  12/31/17 0041    Specimen:  Blood Updated:  12/31/17 0128      Osmolality Calc 288.0 mOsm/kg     BNP [[id number]]  (Abnormal) Collected:  12/31/17 0942    Specimen:  Blood Updated:  12/31/17 1029     BNP 2173.0 (H) pg/mL     Protime-INR [[id number]]  (Abnormal) Collected:  01/01/18 0521    Specimen:  Blood Updated:  01/01/18 0624     Protime 31.0 (H) Seconds       Note new Reference Range        INR 2.93 (H)    Narrative:       Suggested INR therapeutic range for stable oral anticoagulant therapy:    Low Intensity therapy:   1.5-2.0  Moderate Intensity therapy:   2.0-3.0  High Intensity therapy:   2.5-4.0    Osmolality, Calculated [[id number]]  (Normal) Collected:  01/01/18 0521    Specimen:  Blood Updated:  01/01/18 0637     Osmolality Calc 288.9 mOsm/kg     Basic Metabolic Panel [[id number]]  (Abnormal) Collected:  01/01/18 0521    Specimen:  Blood Updated:  01/01/18 0637     Glucose 108 mg/dL      BUN 43 (H) mg/dL      Creatinine 2.26 (H) mg/dL      Sodium 139 mmol/L      Potassium 3.5 mmol/L      Chloride 101 mmol/L      CO2 30.2 mmol/L      Calcium 8.5 mg/dL      eGFR Non African Amer 21 (L) mL/min/1.73      BUN/Creatinine Ratio 19.0     Anion Gap 7.8 mmol/L     Narrative:       The MDRD GFR formula is only valid for adults with stable renal function between ages 18 and 70.    Comprehensive Metabolic Panel [[id number]] Collected:  01/02/18 0536    Specimen:  Blood Updated:  01/02/18 0556    Basic Metabolic Panel [[id number]] Collected:  01/02/18 0536    Specimen:  Blood Updated:  01/02/18 0556    Protime-INR [[id number]] Collected:  01/02/18 0540    Specimen:  Blood Updated:  01/02/18 0558    CBC & Differential [[id number]] Collected:  01/02/18 0538    Specimen:  Blood Updated:  01/02/18 0602    Narrative:       The following orders were created for panel order CBC & Differential.  Procedure                               Abnormality         Status                     ---------                               -----------         ------                     CBC Auto  Differential[[id number]]        Abnormal            Final result                 Please view results for these tests on the individual orders.    CBC Auto Differential [[id number]]  (Abnormal) Collected:  01/02/18 0538    Specimen:  Blood Updated:  01/02/18 0602     WBC 7.09 10*3/mm3      RBC 3.89 (L) 10*6/mm3      Hemoglobin 10.2 (L) g/dL      Hematocrit 32.5 (L) %      MCV 83.5 fL      MCH 26.2 (L) pg      MCHC 31.4 (L) g/dL      RDW 16.5 (H) %      RDW-SD 49.9 fl      MPV 10.5 (H) fL      Platelets 150 10*3/mm3      Neutrophil % 88.1 (H) %      Lymphocyte % 6.2 (L) %      Monocyte % 5.4 %      Eosinophil % 0.1 %      Basophil % 0.1 %      Immature Grans % 0.1 %      Neutrophils, Absolute 6.24 10*3/mm3      Lymphocytes, Absolute 0.44 (L) 10*3/mm3      Monocytes, Absolute 0.38 10*3/mm3      Eosinophils, Absolute 0.01 10*3/mm3      Basophils, Absolute 0.01 10*3/mm3      Immature Grans, Absolute 0.01 10*3/mm3     Osmolality, Calculated [[id number]] Collected:  01/02/18 0536    Specimen:  Blood Updated:  01/02/18 0627        Imaging Results (last 24 hours)     ** No results found for the last 24 hours. **        Assessment:Active Problems:    CHF (congestive heart failure)   Urinary outlet obstruction  Plan: Patient responded with a Bumex.  Also had 2009 mL some urinary retention which Rodriguez catheter was placed

## 2018-01-02 NOTE — PROGRESS NOTES
Nephrology  Note      Subjective       She complains of generalized weakness of today. She is wheezing. Rodriguez placed early today for retention and she has had around 1 L urine out so far.     Objective     Vital Signs  Temp:  [98.3 °F (36.8 °C)-101.1 °F (38.4 °C)] 100.3 °F (37.9 °C)  Heart Rate:  [74-98] 83  Resp:  [18] 18  BP: (133-153)/(64-87) 133/68    I/O this shift:  In: -   Out: 1000 [Urine:1000]  I/O last 3 completed shifts:  In: 300 [P.O.:300]  Out: 600 [Urine:600]    Physical Examination:    General Appearance : alert,  no distress  Head : normocephalic  Eyes :  no pallor   Throat : oral mucosa moist  Neck:  no JVD  Lungs : wheezing,  reduced breath sounds at left base  Heart : regular rhythm & normal rate, normal S1, S2, no murmur  Abdomen :  normal bowel sounds, no masses and soft non-tender  Extremities :  trace edema  Neurologic : orientated to person, place, time, grossly no focal deficitis    Laboratory Data :      WBC WBC   Date Value Ref Range Status   01/02/2018 7.09 4.50 - 12.50 10*3/mm3 Final      HGB Hemoglobin   Date Value Ref Range Status   01/02/2018 10.2 (L) 12.0 - 16.0 g/dL Final      HCT Hematocrit   Date Value Ref Range Status   01/02/2018 32.5 (L) 37.0 - 47.0 % Final      Platlets No results found for: LABPLAT   MCV MCV   Date Value Ref Range Status   01/02/2018 83.5 80.0 - 94.0 fL Final          Sodium Sodium   Date Value Ref Range Status   01/02/2018 136 135 - 153 mmol/L Final   01/02/2018 136 135 - 153 mmol/L Final   01/01/2018 139 135 - 153 mmol/L Final   12/31/2017 139 135 - 153 mmol/L Final      Potassium Potassium   Date Value Ref Range Status   01/02/2018 3.8 3.5 - 5.3 mmol/L Final   01/02/2018 3.8 3.5 - 5.3 mmol/L Final   01/01/2018 3.5 3.5 - 5.3 mmol/L Final   12/31/2017 3.6 3.5 - 5.3 mmol/L Final      Chloride Chloride   Date Value Ref Range Status   01/02/2018 95 (L) 99 - 112 mmol/L Final   01/02/2018 95 (L) 99 - 112 mmol/L Final   01/01/2018 101 99 - 112 mmol/L Final    12/31/2017 103 99 - 112 mmol/L Final      CO2 CO2   Date Value Ref Range Status   01/02/2018 30.0 24.3 - 31.9 mmol/L Final   01/02/2018 30.0 24.3 - 31.9 mmol/L Final   01/01/2018 30.2 24.3 - 31.9 mmol/L Final   12/31/2017 29.0 24.3 - 31.9 mmol/L Final      BUN BUN   Date Value Ref Range Status   01/02/2018 43 (H) 7 - 21 mg/dL Final   01/02/2018 43 (H) 7 - 21 mg/dL Final   01/01/2018 43 (H) 7 - 21 mg/dL Final   12/31/2017 43 (H) 7 - 21 mg/dL Final      Creatinine Creatinine   Date Value Ref Range Status   01/02/2018 2.29 (H) 0.43 - 1.29 mg/dL Final   01/02/2018 2.29 (H) 0.43 - 1.29 mg/dL Final   01/01/2018 2.26 (H) 0.43 - 1.29 mg/dL Final   12/31/2017 2.17 (H) 0.43 - 1.29 mg/dL Final      Calcium Calcium   Date Value Ref Range Status   01/02/2018 8.8 7.7 - 10.0 mg/dL Final   01/02/2018 8.8 7.7 - 10.0 mg/dL Final   01/01/2018 8.5 7.7 - 10.0 mg/dL Final   12/31/2017 8.6 7.7 - 10.0 mg/dL Final      PO4 No results found for: CAPO4   Albumin Albumin   Date Value Ref Range Status   01/02/2018 3.70 3.40 - 4.80 g/dL Final      Magnesium No results found for: MG   Uric Acid No results found for: URICACID     Radiology results :     Imaging Results (last 72 hours)     ** No results found for the last 72 hours. **        Imaging Results (last 24 hours)     Procedure Component Value Units Date/Time    XR Chest 1 View [[id number]] Collected:  01/02/18 1031     Updated:  01/02/18 1034    Narrative:       XR CHEST 1 VW-     CLINICAL INDICATION: difficulty breathing          COMPARISON: 12/31/2017      TECHNIQUE: Single frontal view of the chest.     FINDINGS:     Left effusion and left-sided consolidation  The cardiac silhouette is normal. The pulmonary vasculature is  unremarkable.  There is no evidence of an acute osseous abnormality.   There are no suspicious-appearing parenchymal soft tissue nodules.            Impression:       Left effusion and left-sided consolidation         This report was finalized on 1/2/2018 10:32 AM  by Dr. Bob Lovell MD.               Medications:        aspirin 81 mg Oral Daily   atorvastatin 40 mg Oral Nightly   citalopram 20 mg Oral Daily   furosemide 40 mg Oral BID   gabapentin 400 mg Oral Nightly   hydrALAZINE 25 mg Oral Q12H   ipratropium-albuterol 3 mL Nebulization 4x Daily - RT   isosorbide mononitrate 60 mg Oral Daily   metoprolol succinate  mg Oral Q24H   pantoprazole 40 mg Oral Daily   spironolactone 25 mg Oral Daily   vitamin d 5,000 Units Oral Daily   warfarin 6 mg Oral Daily          Assessment/Plan     Active Problems:    CHF (congestive heart failure)      1. DIANA on CKD 4 : her baseline creatinine used to be below 2 but has been around 2 in last few months. 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  Creatinine improved since admission and is 2.2 today with diuresis. She has no proteinuria    2. systolic CHF :  Compensated, ct lasix 40 mg po bid, check mg. She got bumex 1 mg iv overnight as well. Give kcl    3. HTN : keep -150/80s for her age    4. Moderate pulmonary HTN    5. SOB : cxr obtained and reviewed. She has no pulmonary edema but stable left base consolidation. Defer to primary team for antibiotics    6. Urinary retention : check UA and culture    I discussed the patients findings and my recommendations with patient and nursing staff    Sebas Ellis MD  01/02/18  1:18 PM

## 2018-01-02 NOTE — PLAN OF CARE
Problem: Cardiac Output, Decreased (Adult)  Goal: Adequate Cardiac Output/Effective Tissue Perfusion  Outcome: Ongoing (interventions implemented as appropriate)      Problem: Cardiac: Heart Failure (Adult)  Goal: Signs and Symptoms of Listed Potential Problems Will be Absent or Manageable (Cardiac: Heart Failure)  Outcome: Ongoing (interventions implemented as appropriate)      Problem: Fall Risk (Adult)  Goal: Absence of Falls  Outcome: Ongoing (interventions implemented as appropriate)

## 2018-01-02 NOTE — PLAN OF CARE
Problem: Inpatient Physical Therapy  Goal: Bed Mobility Goal LTG- PT  Outcome: Ongoing (interventions implemented as appropriate)   01/02/18 1727   Bed Mobility PT LTG   Bed Mobility PT LTG, Date Established 01/02/18   Bed Mobility PT LTG, Time to Achieve by discharge   Bed Mobility PT LTG, Activity Type all bed mobility   Bed Mobility PT LTG, [place] Level minimum assist (75% patient effort)   Bed Mobility PT Goal LTG, Assist Device bed rails     Goal: Transfer Training Goal 1 LTG- PT  Outcome: Ongoing (interventions implemented as appropriate)   01/02/18 1727   Transfer Training PT LTG   Transfer Training PT LTG, Date Established 01/02/18   Transfer Training PT LTG, Time to Achieve by discharge   Transfer Training PT LTG, Activity Type all transfers   Transfer Training PT LTG, [place] Level minimum assist (75% patient effort)   Transfer Training PT LTG, Assist Device walker, rolling     Goal: Gait Training Goal LTG- PT  Outcome: Ongoing (interventions implemented as appropriate)   01/02/18 1727   Gait Training PT LTG   Gait Training Goal PT LTG, Date Established 01/02/18   Gait Training Goal PT LTG, Time to Achieve by discharge   Gait Training Goal PT LTG, [place] Level minimum assist (75% patient effort);moderate assist (50% patient effort)   Gait Training Goal PT LTG, Assist Device walker, rolling   Gait Training Goal PT LTG, Distance to Achieve 20

## 2018-01-02 NOTE — THERAPY EVALUATION
Acute Care - Physical Therapy Initial Evaluation   [place]     Patient Name: Tre Kinney  : 1932  MRN: [id number]  Today's Date: 2018   Onset of Illness/Injury or Date of Surgery Date: 18  Date of Referral to PT: 18  Referring Physician: Dr. Barone      Admit Date: 2017     Visit Dx:  No diagnosis found.  Patient Active Problem List   Diagnosis   • Acid reflux   • Ankle arthralgia   • Cardiac conduction disorder   • Closed fracture of distal fibula   • Arteriosclerosis of coronary artery   • Fracture of distal end of tibia   • Elevated cholesterol   • BP (high blood pressure)   • Hypertrophic polyarthritis   • OP (osteoporosis)   • Right heart failure   • Heart failure, chronic, right-sided   • SOB (shortness of breath)   • Palpitations   • Acute CHF   • CHF (congestive heart failure)   • Congestive heart failure   • Precordial pain     Past Medical History:   Diagnosis Date   • Arthritis    • CHF (congestive heart failure)    • Coronary artery disease    • H/O blood clots    • History of transfusion     no reaction    • Hypertension      Past Surgical History:   Procedure Laterality Date   • ABDOMINAL SURGERY     • APPENDECTOMY     • CARDIAC SURGERY     •  SECTION     • CORONARY ANGIOPLASTY WITH STENT PLACEMENT     • PACEMAKER IMPLANTATION      left side    • SKIN BIOPSY     • TUBAL ABDOMINAL LIGATION            PT ASSESSMENT (last 72 hours)      PT Evaluation       18 1700 18 0708    Rehab Evaluation    Document Type evaluation  -BC     Subjective Information agree to therapy;complains of;weakness;fatigue  -BC     Patient Effort, Rehab Treatment fair  -BC     General Information    Patient Profile Review yes  -BC     Onset of Illness/Injury or Date of Surgery Date 18  -BC     Referring Physician Dr. Barone  -BC     General Observations Pt. supine in bed, awake and alert, no visitor in room.  -BC     Prior Level of Function min assist:;mod assist:;all  household mobility  -BC     Plans/Goals Discussed With patient;agreed upon  -BC     Risks Reviewed patient:;LOB;nausea/vomiting;dizziness;increased discomfort;change in vital signs;lines disloged  -BC     Benefits Reviewed patient:;improve function;increase independence;increase strength;increase balance;decrease pain;decrease risk of DVT;increase knowledge  -BC     Living Environment    Lives With child(abner), adult   she lives with her son  -BC     Clinical Impression    Date of Referral to PT 01/02/18  -BC     Functional Level At Time Of Evaluation poor  -BC     Criteria for Skilled Therapeutic Interventions Met yes;treatment indicated  -BC     Rehab Potential fair, will monitor progress closely  -BC     Predicted Duration of Therapy Intervention (days/wks) LOS  -BC     Pain Assessment    Pain Assessment No/denies pain  -BC     Cognitive Assessment/Intervention    Current Cognitive/Communication Assessment functional  -BC     Orientation Status oriented to;person;place;situation  -BC     Follows Commands/Answers Questions 100% of the time;able to follow single-step instructions  -BC     Personal Safety mild impairment;decreased awareness, need for assist;decreased awareness, need for safety;decreased insight to deficits  -BC     Personal Safety Interventions fall prevention program maintained;gait belt;muscle strengthening facilitated;nonskid shoes/slippers when out of bed  -BC     ROM (Range of Motion)    General ROM no range of motion deficits identified  -BC     MMT (Manual Muscle Testing)    General MMT Assessment lower extremity strength deficits identified  -BC     General MMT Assessment Detail BLE MMT 3-/5  -BC     Muscle Tone Assessment    Muscle Tone Assessment Bilateral Lower Extremities  -BC Bilateral Lower Extremities  -BB    Bilateral Lower Extremities Muscle Tone Assessment moderately decreased tone  -BC moderately decreased tone  -BB    Bed Mobility, Assessment/Treatment    Bed Mobility, Assistive  Device bed rails  -BC     Bed Mob, Supine to Sit, [place] moderate assist (50% patient effort);maximum assist (25% patient effort);2 person assist required  -BC     Bed Mob, Sit to Supine, [place] moderate assist (50% patient effort);maximum assist (25% patient effort);2 person assist required  -BC     Transfer Assessment/Treatment    Transfers, Bed-Chair [place] maximum assist (25% patient effort)  -BC     Transfers, Sit-Stand [place] maximum assist (25% patient effort);2 person assist required  -BC     Transfers, Stand-Sit [place] maximum assist (25% patient effort);2 person assist required  -BC     Transfers, Sit-Stand-Sit, Assist Device rolling walker  -BC     Gait Assessment/Treatment    Gait, [place] Level unable to perform  -BC     Positioning and Restraints    Pre-Treatment Position in bed  -BC     Post Treatment Position bed  -BC     In Bed notified nsg;supine;call light within reach;encouraged to call for assist;side rails up x3  -BC       12/31/17 0859 12/30/17 2015    Muscle Tone Assessment    Muscle Tone Assessment Bilateral Lower Extremities  -EB Bilateral Lower Extremities  -    Bilateral Lower Extremities Muscle Tone Assessment moderately decreased tone  -EB moderately decreased tone  -MC      User Key  (r) = Recorded By, (t) = Taken By, (c) = Cosigned By    Initials Name Provider Type    AMILCAR Auguste, RN Registered Nurse    BC Vandana Pike, PT Physical Therapist    BB Valdez Motta, RN Registered Nurse    ZULMA BECK RN Registered Nurse          Physical Therapy Education     Title: PT OT SLP Therapies (Active)     Topic: Physical Therapy (Active)     Point: Mobility training (Active)    Learning Progress Summary    Learner Readiness Method Response Comment Documented by Status   Patient Acceptance E NR  BC 01/02/18 7948 Active               Point: Home exercise program (Active)    Learning Progress Summary    Learner Readiness Method  Response Comment Documented by Status   Patient Acceptance E NR  BC 01/02/18 1726 Active               Point: Body mechanics (Active)    Learning Progress Summary    Learner Readiness Method Response Comment Documented by Status   Patient Acceptance E NR  BC 01/02/18 1726 Active               Point: Precautions (Active)    Learning Progress Summary    Learner Readiness Method Response Comment Documented by Status   Patient Acceptance E NR  BC 01/02/18 1726 Active                      User Key     Initials Effective Dates Name Provider Type Discipline    BC 03/14/16 -  Vandana Pike, PT Physical Therapist PT                PT Recommendation and Plan  Anticipated Equipment Needs At Discharge: front wheeled walker, bedside commode  Anticipated Discharge Disposition: home with home health, home with 24/7 care, inpatient rehabilitation facility, skilled nursing facility  Planned Therapy Interventions: bed mobility training, balance training, gait training, home exercise program, patient/family education, strengthening, transfer training  PT Frequency: 3-5 times/wk, per priority policy             IP PT Goals       01/02/18 1727          Bed Mobility PT LTG    Bed Mobility PT LTG, Date Established 01/02/18  -BC      Bed Mobility PT LTG, Time to Achieve by discharge  -BC      Bed Mobility PT LTG, Activity Type all bed mobility  -BC      Bed Mobility PT LTG, [place] Level minimum assist (75% patient effort)  -BC      Bed Mobility PT Goal  LTG, Assist Device bed rails  -BC      Transfer Training PT LTG    Transfer Training PT LTG, Date Established 01/02/18  -BC      Transfer Training PT LTG, Time to Achieve by discharge  -BC      Transfer Training PT LTG, Activity Type all transfers  -BC      Transfer Training PT LTG, [place] Level minimum assist (75% patient effort)  -BC      Transfer Training PT LTG, Assist Device walker, rolling  -BC      Gait Training PT LTG    Gait Training Goal PT LTG, Date Established  01/02/18  -BC      Gait Training Goal PT LTG, Time to Achieve by discharge  -BC      Gait Training Goal PT LTG, [place] Level minimum assist (75% patient effort);moderate assist (50% patient effort)  -BC      Gait Training Goal PT LTG, Assist Device walker, rolling  -BC      Gait Training Goal PT LTG, Distance to Achieve 20  -BC        User Key  (r) = Recorded By, (t) = Taken By, (c) = Cosigned By    Initials Name Provider Type    WHITNEY Pike PT Physical Therapist                Outcome Measures       01/02/18 1700          How much help from another person do you currently need...    Turning from your back to your side while in flat bed without using bedrails? 2  -BC      Moving from lying on back to sitting on the side of a flat bed without bedrails? 2  -BC      Moving to and from a bed to a chair (including a wheelchair)? 1  -BC      Standing up from a chair using your arms (e.g., wheelchair, bedside chair)? 1  -BC      Climbing 3-5 steps with a railing? 1  -BC      To walk in hospital room? 1  -BC      AM-PAC 6 Clicks Score 8  -BC      Functional Assessment    Outcome Measure Options AM-PAC 6 Clicks Basic Mobility (PT)  -BC        User Key  (r) = Recorded By, (t) = Taken By, (c) = Cosigned By    Initials Name Provider Type    WHITNEY Pike PT Physical Therapist           Time Calculation:         PT Charges       01/02/18 1724          Time Calculation    Start Time --   60  -BC      PT Received On 01/02/18  -BC      PT Goal Re-Cert Due Date 01/16/18  -BC        User Key  (r) = Recorded By, (t) = Taken By, (c) = Cosigned By    Initials Name Provider Type    WHITNEY Pike PT Physical Therapist          Therapy Charges for Today     Code Description Service Date Service Provider Modifiers Qty    69241677115 HC PT MOBILITY CURRENT 1/2/2018 Vandana Pike, PT GP, CM 1    95325784807 HC PT MOBILITY PROJECTED 1/2/2018 Vandana Pike, PT GP, CL 1    62005769240 HC PT EVAL MOD COMPLEXITY  2 1/2/2018 Vandana Pike, PT GP 1    13700306650 HC PT THERAPEUTIC ACT EA 15 MIN 1/2/2018 Vandana Pike, PT GP 1    46367690535 HC PT THER SUPP EA 15 MIN 1/2/2018 Vandana Pike, PT GP 4    53445964955 HC PT THER PROC EA 15 MIN 1/2/2018 Vandana Pike, PT GP 1          PT G-Codes  Outcome Measure Options: AM-PAC 6 Clicks Basic Mobility (PT)  Score: 8  Functional Limitation: Mobility: Walking and moving around  Mobility: Walking and Moving Around Current Status (): At least 80 percent but less than 100 percent impaired, limited or restricted  Mobility: Walking and Moving Around Goal Status (): At least 60 percent but less than 80 percent impaired, limited or restricted      Vandana Pike, PT  1/2/2018

## 2018-01-02 NOTE — PROGRESS NOTES
LOS: 7 days   Patient Care Team:  Myles Barone MD as PCP - General  Myles Barone MD as PCP - Family Medicine  Kelli Márquez, RN as Care Coordinator (Population Health)      Subjective     Admission information:    The patient is an 85-year-old white female with a history of coronary artery disease, congestive heart failure, acute on chronic renal failure, gastroesophageal reflux disease, hypertension, dyslipidemia and previous history of DVTs.  According to the patient she started the current episode approximately one week ago.  She states she tried to put off coming to the hospital because of the holidays.  However her shortness of breath and lower extremity edema continued to worsen until she decided to come in 2 days ago.  She states that over that time she has been extremely short of breath and has had weeping lower extremity edema to thighs.  She also states she was previously having chest pain although she has not had any chest pain recently.  She is supposed to undergo a stress test and a Holter but she did not complete these tests.  She has been started on diuretics by nephrology but her renal function has continued to worsen.  Cardiology was consulted for further evaluation.    Interval History:     The patient continues to complain of cough.  She appears more lethargic than she has in the last few days. She had an episode of shortness of breath overnight.  She had a Rodriguez catheter placed.  She was also given a dose of IV Bumex.    History taken from: patient chart    Vital Signs  Temp:  [98.3 °F (36.8 °C)-99.1 °F (37.3 °C)] 98.5 °F (36.9 °C)  Heart Rate:  [74-98] 93  Resp:  [18-20] 18  BP: (139-153)/(64-87) 153/83    Physical Exam:     Physical Exam   Constitutional: She is oriented to person, place, and time. She appears well-developed and well-nourished.   Obese white female lying comfortably on bed.   HENT:   Mouth/Throat: Oropharynx is clear and moist.   Eyes: EOM are normal. Pupils  are equal, round, and reactive to light.   Neck: Neck supple. No JVD present. No tracheal deviation present. No thyromegaly present.   Cardiovascular: Normal rate, regular rhythm, S1 normal and S2 normal.  Exam reveals no gallop and no friction rub.    No murmur heard.  Pulmonary/Chest: Effort normal. No respiratory distress. She has no wheezes. She has rales.   Abdominal: Soft. Bowel sounds are normal. She exhibits no mass. There is no tenderness.   Musculoskeletal: Normal range of motion. She exhibits no edema.   Lymphadenopathy:     She has no cervical adenopathy.   Neurological: She is alert and oriented to person, place, and time.   Skin: Skin is warm and dry. No rash noted.   Psychiatric: She has a normal mood and affect.       Results Review:     I reviewed the patient's new clinical results.  I reviewed the patient's new imaging results and agree with the interpretation.  I reviewed the patient's other test results and agree with the interpretation  I personally viewed and interpreted the patient's EKG/Telemetry data    Medication:  Scheduled Meds:    aspirin 81 mg Oral Daily   atorvastatin 40 mg Oral Nightly   citalopram 20 mg Oral Daily   furosemide 40 mg Oral BID   gabapentin 400 mg Oral Nightly   hydrALAZINE 25 mg Oral Q12H   ipratropium-albuterol 3 mL Nebulization 4x Daily - RT   isosorbide mononitrate 60 mg Oral Daily   metoprolol succinate  mg Oral Q24H   pantoprazole 40 mg Oral Daily   spironolactone 25 mg Oral Daily   vitamin d 5,000 Units Oral Daily   warfarin 6 mg Oral Daily     Continuous Infusions:   PRN Meds:.•  ALPRAZolam  •  HYDROcodone-acetaminophen  •  phenol    Telemetry: Ventricular pacing in the 80s.      Assessment/Plan     1.  Congestive heart failure: Patient with acute on chronic systolic congestive heart failure.  Her last echocardiogram showed an ejection fraction of 36-40%.  She is already on a beta blocker and ACE inhibitor.   She is not a candidate for an ACE inhibitor.   She is currently on BiDil equivalent with isosorbide mononitrate and hydralazine.  She is undergoing diuresis with Lasix by nephrology.     2.  Acute on chronic renal failure: Patient with acute on chronic renal failure that is currently being managed by nephrology.     3.  ASCVD: Patient with a history of ASCVD previously on full therapy for coronary artery disease.  Most of the has been discontinued at this point.     4.  Dyslipidemia: Patient with history of dyslipidemia as well as to be beneficial adequate control.     5.  Chest pain: Patient with a previous history of chest pain concerning for worsening coronary artery disease she was supposed to undergo a stress test which she did not complete as an outpatient.    6.  Hypertension: Patient with a history of hypertension presented to be hypertensive today.  Her hydralazine dose was decreased by nephrology.  Will defer management of his condition to them.    7.  COPD: Patient with a history of COPD with moderate obstructive disease on recent pulmonary function tests.     Plan:     1.  Congestive heart failure:  The patient is currently on a beta blocker and BiDil equivalent.  She is also on spironolactone for diastolic heart failure.  The patient's last BNP remains elevated at 2000.  She apparently responded well to diuretics given overnight but her renal function appears to be worse.  Clinically the patient does not appear to be severely volume overloaded.  Would continue diuresis and monitor.    2.  ASCVD: This point will start the patient on low-dose aspirin, beta blocker and atorvastatin 40 mg by mouth daily.  She is not a candidate for an ACE inhibitor.  Would continue current regimen.    Nba Arora MD  01/02/18  9:40 AM    Dragon disclaimer:  Much of this encounter note is an electronic transcription/translation of spoken language to printed text. The electronic translation of spoken language may permit erroneous, or at times,  nonsensical words or phrases to be inadvertently transcribed; Although I have reviewed the note for such errors, some may still exist.

## 2018-01-02 NOTE — PROGRESS NOTES
Discharge Planning Assessment   [facility]     Patient Name: Tre Kinney  MRN: [id number]  Today's Date: 1/2/2018    Admit Date: 12/26/2017          Discharge Needs Assessment     None            Discharge Plan       01/02/18 1405    Case Management/Social Work Plan    Plan Pt admitted on 12/26/17.  Pt lives at home with son and plans to return home at discharge.  Pt currently does not utilize home health services.  Pt currently utilizes hospital bed, rolling walker, bedside commode, home 02 via [facility].  SS will follow and assist with discharge needs.     Patient/Family In Agreement With Plan yes        Discharge Placement     No information found        Expected Discharge Date and Time     Expected Discharge Date Expected Discharge Time    Jan 4, 2018               Demographic Summary     None            Functional Status     None            Psychosocial     None            Abuse/Neglect     None            Legal     None            Substance Abuse     None            Patient Forms     None          Mary Salgado

## 2018-01-03 LAB
A-A DO2: 138.1 MMHG (ref 0–300)
ANION GAP SERPL CALCULATED.3IONS-SCNC: 11.8 MMOL/L (ref 3.6–11.2)
ARTERIAL PATENCY WRIST A: ABNORMAL
ATMOSPHERIC PRESS: 729 MMHG
BASE EXCESS BLDA CALC-SCNC: 2.2 MMOL/L
BASOPHILS # BLD AUTO: 0.03 10*3/MM3 (ref 0–0.3)
BASOPHILS NFR BLD AUTO: 0.3 % (ref 0–2)
BDY SITE: ABNORMAL
BNP SERPL-MCNC: 3797 PG/ML (ref 0–100)
BODY TEMPERATURE: 98.6 C
BUN BLD-MCNC: 43 MG/DL (ref 7–21)
BUN/CREAT SERPL: 20.2 (ref 7–25)
CALCIUM SPEC-SCNC: 8.5 MG/DL (ref 7.7–10)
CHLORIDE SERPL-SCNC: 99 MMOL/L (ref 99–112)
CK MB SERPL-CCNC: <0.18 NG/ML (ref 0–5)
CK MB SERPL-RTO: NORMAL % (ref 0–3)
CK SERPL-CCNC: 42 U/L (ref 24–173)
CO2 SERPL-SCNC: 28.2 MMOL/L (ref 24.3–31.9)
COHGB MFR BLD: 1.6 % (ref 0–5)
CREAT BLD-MCNC: 2.13 MG/DL (ref 0.43–1.29)
CRP SERPL-MCNC: 10.63 MG/DL (ref 0–0.99)
DEPRECATED RDW RBC AUTO: 49.6 FL (ref 37–54)
EOSINOPHIL # BLD AUTO: 0 10*3/MM3 (ref 0–0.7)
EOSINOPHIL NFR BLD AUTO: 0 % (ref 0–7)
ERYTHROCYTE [DISTWIDTH] IN BLOOD BY AUTOMATED COUNT: 16.8 % (ref 11.5–14.5)
GFR SERPL CREATININE-BSD FRML MDRD: 22 ML/MIN/1.73
GLUCOSE BLD-MCNC: 102 MG/DL (ref 70–110)
HCO3 BLDA-SCNC: 25.7 MMOL/L (ref 22–26)
HCT VFR BLD AUTO: 31 % (ref 37–47)
HCT VFR BLD CALC: 34 % (ref 37–47)
HGB BLD-MCNC: 9.7 G/DL (ref 12–16)
HGB BLDA-MCNC: 11.4 G/DL (ref 12–16)
HOROWITZ INDEX BLD+IHG-RTO: 36 %
IMM GRANULOCYTES # BLD: 0.04 10*3/MM3 (ref 0–0.03)
IMM GRANULOCYTES NFR BLD: 0.4 % (ref 0–0.5)
INR PPP: 3.08 (ref 0.9–1.1)
LYMPHOCYTES # BLD AUTO: 1.33 10*3/MM3 (ref 1–3)
LYMPHOCYTES NFR BLD AUTO: 13.2 % (ref 16–46)
MCH RBC QN AUTO: 25.7 PG (ref 27–33)
MCHC RBC AUTO-ENTMCNC: 31.3 G/DL (ref 33–37)
MCV RBC AUTO: 82.2 FL (ref 80–94)
METHGB BLD QL: 0.1 % (ref 0–3)
MODALITY: ABNORMAL
MONOCYTES # BLD AUTO: 1.46 10*3/MM3 (ref 0.1–0.9)
MONOCYTES NFR BLD AUTO: 14.5 % (ref 0–12)
NEUTROPHILS # BLD AUTO: 7.2 10*3/MM3 (ref 1.4–6.5)
NEUTROPHILS NFR BLD AUTO: 71.6 % (ref 40–75)
NRBC BLD MANUAL-RTO: 0 /100 WBC (ref 0–0)
OSMOLALITY SERPL CALC.SUM OF ELEC: 288.6 MOSM/KG (ref 273–305)
OXYHGB MFR BLDV: 90.7 % (ref 85–100)
PCO2 BLDA: 36 MM HG (ref 35–45)
PH BLDA: 7.47 PH UNITS (ref 7.35–7.45)
PLATELET # BLD AUTO: 101 10*3/MM3 (ref 130–400)
PMV BLD AUTO: 10.2 FL (ref 6–10)
PO2 BLDA: 65.7 MM HG (ref 80–100)
POTASSIUM BLD-SCNC: 3.7 MMOL/L (ref 3.5–5.3)
PROTHROMBIN TIME: 32.3 SECONDS (ref 11–15.4)
RBC # BLD AUTO: 3.77 10*6/MM3 (ref 4.2–5.4)
SAO2 % BLDCOA: 92.3 % (ref 90–100)
SODIUM BLD-SCNC: 139 MMOL/L (ref 135–153)
TROPONIN I SERPL-MCNC: 0.31 NG/ML
WBC NRBC COR # BLD: 10.06 10*3/MM3 (ref 4.5–12.5)

## 2018-01-03 PROCEDURE — 97110 THERAPEUTIC EXERCISES: CPT

## 2018-01-03 PROCEDURE — 94799 UNLISTED PULMONARY SVC/PX: CPT

## 2018-01-03 PROCEDURE — 99232 SBSQ HOSP IP/OBS MODERATE 35: CPT | Performed by: INTERNAL MEDICINE

## 2018-01-03 PROCEDURE — 99222 1ST HOSP IP/OBS MODERATE 55: CPT | Performed by: INTERNAL MEDICINE

## 2018-01-03 PROCEDURE — 86140 C-REACTIVE PROTEIN: CPT | Performed by: INTERNAL MEDICINE

## 2018-01-03 PROCEDURE — 83050 HGB METHEMOGLOBIN QUAN: CPT | Performed by: FAMILY MEDICINE

## 2018-01-03 PROCEDURE — 92610 EVALUATE SWALLOWING FUNCTION: CPT

## 2018-01-03 PROCEDURE — 82550 ASSAY OF CK (CPK): CPT | Performed by: INTERNAL MEDICINE

## 2018-01-03 PROCEDURE — 36600 WITHDRAWAL OF ARTERIAL BLOOD: CPT | Performed by: FAMILY MEDICINE

## 2018-01-03 PROCEDURE — G8998 SWALLOW D/C STATUS: HCPCS

## 2018-01-03 PROCEDURE — 85610 PROTHROMBIN TIME: CPT | Performed by: FAMILY MEDICINE

## 2018-01-03 PROCEDURE — G8997 SWALLOW GOAL STATUS: HCPCS

## 2018-01-03 PROCEDURE — 82805 BLOOD GASES W/O2 SATURATION: CPT | Performed by: FAMILY MEDICINE

## 2018-01-03 PROCEDURE — 97530 THERAPEUTIC ACTIVITIES: CPT

## 2018-01-03 PROCEDURE — 82553 CREATINE MB FRACTION: CPT | Performed by: INTERNAL MEDICINE

## 2018-01-03 PROCEDURE — 85025 COMPLETE CBC W/AUTO DIFF WBC: CPT | Performed by: FAMILY MEDICINE

## 2018-01-03 PROCEDURE — 80048 BASIC METABOLIC PNL TOTAL CA: CPT | Performed by: INTERNAL MEDICINE

## 2018-01-03 PROCEDURE — 83880 ASSAY OF NATRIURETIC PEPTIDE: CPT | Performed by: INTERNAL MEDICINE

## 2018-01-03 PROCEDURE — 25010000002 VANCOMYCIN PER 500 MG: Performed by: INTERNAL MEDICINE

## 2018-01-03 PROCEDURE — 82375 ASSAY CARBOXYHB QUANT: CPT | Performed by: FAMILY MEDICINE

## 2018-01-03 PROCEDURE — 84484 ASSAY OF TROPONIN QUANT: CPT | Performed by: INTERNAL MEDICINE

## 2018-01-03 PROCEDURE — 87040 BLOOD CULTURE FOR BACTERIA: CPT | Performed by: INTERNAL MEDICINE

## 2018-01-03 PROCEDURE — 25010000002 PIPERACILLIN-TAZOBACTAM: Performed by: INTERNAL MEDICINE

## 2018-01-03 PROCEDURE — G8996 SWALLOW CURRENT STATUS: HCPCS

## 2018-01-03 RX ORDER — BUMETANIDE 0.25 MG/ML
1 INJECTION INTRAMUSCULAR; INTRAVENOUS ONCE
Status: COMPLETED | OUTPATIENT
Start: 2018-01-03 | End: 2018-01-03

## 2018-01-03 RX ADMIN — IPRATROPIUM BROMIDE AND ALBUTEROL SULFATE 3 ML: .5; 3 SOLUTION RESPIRATORY (INHALATION) at 00:10

## 2018-01-03 RX ADMIN — WARFARIN SODIUM 6 MG: 3 TABLET ORAL at 17:00

## 2018-01-03 RX ADMIN — FUROSEMIDE 40 MG: 40 TABLET ORAL at 07:59

## 2018-01-03 RX ADMIN — ATORVASTATIN CALCIUM 40 MG: 40 TABLET, FILM COATED ORAL at 20:19

## 2018-01-03 RX ADMIN — CITALOPRAM HYDROBROMIDE 20 MG: 20 TABLET ORAL at 07:59

## 2018-01-03 RX ADMIN — VANCOMYCIN HYDROCHLORIDE 1000 MG: 5 INJECTION, POWDER, LYOPHILIZED, FOR SOLUTION INTRAVENOUS at 12:38

## 2018-01-03 RX ADMIN — HYDRALAZINE HYDROCHLORIDE 25 MG: 25 TABLET ORAL at 07:58

## 2018-01-03 RX ADMIN — PIPERACILLIN AND TAZOBACTAM 2.25 G: 2; .25 INJECTION, POWDER, LYOPHILIZED, FOR SOLUTION INTRAVENOUS; PARENTERAL at 12:38

## 2018-01-03 RX ADMIN — IPRATROPIUM BROMIDE AND ALBUTEROL SULFATE 3 ML: .5; 3 SOLUTION RESPIRATORY (INHALATION) at 06:35

## 2018-01-03 RX ADMIN — IPRATROPIUM BROMIDE AND ALBUTEROL SULFATE 3 ML: .5; 3 SOLUTION RESPIRATORY (INHALATION) at 18:59

## 2018-01-03 RX ADMIN — HYDRALAZINE HYDROCHLORIDE 25 MG: 25 TABLET ORAL at 20:19

## 2018-01-03 RX ADMIN — IPRATROPIUM BROMIDE AND ALBUTEROL SULFATE 3 ML: .5; 3 SOLUTION RESPIRATORY (INHALATION) at 13:08

## 2018-01-03 RX ADMIN — PANTOPRAZOLE SODIUM 40 MG: 40 TABLET, DELAYED RELEASE ORAL at 07:59

## 2018-01-03 RX ADMIN — PIPERACILLIN AND TAZOBACTAM 2.25 G: 2; .25 INJECTION, POWDER, LYOPHILIZED, FOR SOLUTION INTRAVENOUS; PARENTERAL at 17:56

## 2018-01-03 RX ADMIN — ISOSORBIDE MONONITRATE 60 MG: 60 TABLET, EXTENDED RELEASE ORAL at 07:59

## 2018-01-03 RX ADMIN — ASPIRIN 81 MG: 81 TABLET ORAL at 07:58

## 2018-01-03 RX ADMIN — SPIRONOLACTONE: 25 TABLET ORAL at 07:58

## 2018-01-03 RX ADMIN — CHOLECALCIFEROL CAP 125 MCG (5000 UNIT) 5000 UNITS: 125 CAP at 08:00

## 2018-01-03 RX ADMIN — METOPROLOL SUCCINATE 100 MG: 50 TABLET, FILM COATED, EXTENDED RELEASE ORAL at 07:58

## 2018-01-03 RX ADMIN — FUROSEMIDE 40 MG: 40 TABLET ORAL at 17:00

## 2018-01-03 RX ADMIN — GABAPENTIN 400 MG: 400 CAPSULE ORAL at 20:19

## 2018-01-03 RX ADMIN — BUMETANIDE 1 MG: 0.25 INJECTION, SOLUTION INTRAMUSCULAR; INTRAVENOUS at 17:00

## 2018-01-03 NOTE — PROGRESS NOTES
Tre Kinney 85 y.o.   01/03/18    Subjective: The patient still feels weak and short of air.  Objective: UNSTABLE no change physically the patient obviously does not feel well  Vital Signs (last 72 hrs)       12/30 0700  -  12/31 0659 12/31 0700  -  01/01 0659 01/01 0700  -  01/02 0659 01/02 0700  -  01/03 0622   Most Recent    Temp (°F) 98 -  99.1    97.5 -  99.9    98.3 -  99.1    97.8 -  (!)101.1     98.4 (36.9)    Heart Rate 78 -  81    74 -  80    74 -  98    78 -  83     79    Resp 18 -  20    18 -  20    18 -  20    18 -  20     18    /76 -  156/78    125/61 -  133/71    139/73 -  153/83    121/52 -  154/75     138/87    SpO2 (%) 94 -  97    92 -  98    91 -  98    92 -  95     92        Lab Results (last 72 hours)     Procedure Component Value Units Date/Time    BNP [[id number]]  (Abnormal) Collected:  12/31/17 0942    Specimen:  Blood Updated:  12/31/17 1029     BNP 2173.0 (H) pg/mL     Protime-INR [[id number]]  (Abnormal) Collected:  01/01/18 0521    Specimen:  Blood Updated:  01/01/18 0624     Protime 31.0 (H) Seconds       Note new Reference Range        INR 2.93 (H)    Narrative:       Suggested INR therapeutic range for stable oral anticoagulant therapy:    Low Intensity therapy:   1.5-2.0  Moderate Intensity therapy:   2.0-3.0  High Intensity therapy:   2.5-4.0    Osmolality, Calculated [[id number]]  (Normal) Collected:  01/01/18 0521    Specimen:  Blood Updated:  01/01/18 0637     Osmolality Calc 288.9 mOsm/kg     Basic Metabolic Panel [[id number]]  (Abnormal) Collected:  01/01/18 0521    Specimen:  Blood Updated:  01/01/18 0637     Glucose 108 mg/dL      BUN 43 (H) mg/dL      Creatinine 2.26 (H) mg/dL      Sodium 139 mmol/L      Potassium 3.5 mmol/L      Chloride 101 mmol/L      CO2 30.2 mmol/L      Calcium 8.5 mg/dL      eGFR Non African Amer 21 (L) mL/min/1.73      BUN/Creatinine Ratio 19.0     Anion Gap 7.8 mmol/L     Narrative:       The MDRD GFR formula is only valid for adults with stable  renal function between ages 18 and 70.    CBC & Differential [[id number]] Collected:  01/02/18 0538    Specimen:  Blood Updated:  01/02/18 0602    Narrative:       The following orders were created for panel order CBC & Differential.  Procedure                               Abnormality         Status                     ---------                               -----------         ------                     CBC Auto Differential[[id number]]        Abnormal            Final result                 Please view results for these tests on the individual orders.    CBC Auto Differential [[id number]]  (Abnormal) Collected:  01/02/18 0538    Specimen:  Blood Updated:  01/02/18 0602     WBC 7.09 10*3/mm3      RBC 3.89 (L) 10*6/mm3      Hemoglobin 10.2 (L) g/dL      Hematocrit 32.5 (L) %      MCV 83.5 fL      MCH 26.2 (L) pg      MCHC 31.4 (L) g/dL      RDW 16.5 (H) %      RDW-SD 49.9 fl      MPV 10.5 (H) fL      Platelets 150 10*3/mm3      Neutrophil % 88.1 (H) %      Lymphocyte % 6.2 (L) %      Monocyte % 5.4 %      Eosinophil % 0.1 %      Basophil % 0.1 %      Immature Grans % 0.1 %      Neutrophils, Absolute 6.24 10*3/mm3      Lymphocytes, Absolute 0.44 (L) 10*3/mm3      Monocytes, Absolute 0.38 10*3/mm3      Eosinophils, Absolute 0.01 10*3/mm3      Basophils, Absolute 0.01 10*3/mm3      Immature Grans, Absolute 0.01 10*3/mm3     Protime-INR [[id number]]  (Abnormal) Collected:  01/02/18 0540    Specimen:  Blood Updated:  01/02/18 0630     Protime 31.0 (H) Seconds       Note new Reference Range        INR 2.93 (H)    Narrative:       Suggested INR therapeutic range for stable oral anticoagulant therapy:    Low Intensity therapy:   1.5-2.0  Moderate Intensity therapy:   2.0-3.0  High Intensity therapy:   2.5-4.0    Basic Metabolic Panel [[id number]]  (Abnormal) Collected:  01/02/18 0536    Specimen:  Blood Updated:  01/02/18 0631     Glucose 121 (H) mg/dL      BUN 43 (H) mg/dL      Creatinine 2.29 (H) mg/dL      Sodium 136 mmol/L       Potassium 3.8 mmol/L      Chloride 95 (L) mmol/L      CO2 30.0 mmol/L      Calcium 8.8 mg/dL      eGFR Non African Amer 20 (L) mL/min/1.73      BUN/Creatinine Ratio 18.8     Anion Gap 11.0 mmol/L     Narrative:       The MDRD GFR formula is only valid for adults with stable renal function between ages 18 and 70.    Comprehensive Metabolic Panel [[id number]]  (Abnormal) Collected:  01/02/18 0536    Specimen:  Blood Updated:  01/02/18 0632     Glucose 121 (H) mg/dL      BUN 43 (H) mg/dL      Creatinine 2.29 (H) mg/dL      Sodium 136 mmol/L      Potassium 3.8 mmol/L      Chloride 95 (L) mmol/L      CO2 30.0 mmol/L      Calcium 8.8 mg/dL      Total Protein 6.6 g/dL      Albumin 3.70 g/dL      ALT (SGPT) 14 U/L      AST (SGOT) 22 U/L      Alkaline Phosphatase 53 U/L      Total Bilirubin 0.7 mg/dL      eGFR Non African Amer 20 (L) mL/min/1.73      Globulin 2.9 gm/dL      A/G Ratio 1.3 (L) g/dL      BUN/Creatinine Ratio 18.8     Anion Gap 11.0 mmol/L     Narrative:       The MDRD GFR formula is only valid for adults with stable renal function between ages 18 and 70.    Osmolality, Calculated [[id number]]  (Normal) Collected:  01/02/18 0536    Specimen:  Blood Updated:  01/02/18 0632     Osmolality Calc 284.0 mOsm/kg     Blood Gas, Arterial [[id number]]  (Abnormal) Collected:  01/02/18 1028    Specimen:  Arterial Blood Updated:  01/02/18 1040     Site Arterial: right radial     Devang's Test Positive     pH, Arterial 7.470 (H) pH units      pCO2, Arterial 40.4 mm Hg      pO2, Arterial 53.8 (C) mm Hg      HCO3, Arterial 28.7 (H) mmol/L      Base Excess, Arterial 4.7 mmol/L      O2 Saturation, Arterial 88.2 (L) %      Hemoglobin, Blood Gas 10.0 (L) g/dL      Hematocrit, Blood Gas 29.0 (L) %      Oxyhemoglobin 86.8 %      Methemoglobin 0.20 %      Carboxyhemoglobin 1.4 %      A-a Gradiant 120.4 mmHg      Temperature 98.6 C      Barometric Pressure for Blood Gas 739 mmHg      Modality Cannula - Nasal     FIO2 32 %     Urine  Culture - Urine, Urine, Clean Catch [[id number]] Collected:  01/02/18 0935    Specimen:  Urine from Urine, Clean Catch Updated:  01/02/18 1236    Magnesium [[id number]]  (Normal) Collected:  01/02/18 1405    Specimen:  Blood Updated:  01/02/18 1518     Magnesium 2.0 mg/dL     Protime-INR [[id number]]  (Abnormal) Collected:  01/03/18 0312    Specimen:  Blood Updated:  01/03/18 0339     Protime 32.3 (H) Seconds       Note new Reference Range        INR 3.08 (H)    Narrative:       Suggested INR therapeutic range for stable oral anticoagulant therapy:    Low Intensity therapy:   1.5-2.0  Moderate Intensity therapy:   2.0-3.0  High Intensity therapy:   2.5-4.0    Basic Metabolic Panel [[id number]]  (Abnormal) Collected:  01/03/18 0312    Specimen:  Blood Updated:  01/03/18 0342     Glucose 102 mg/dL      BUN 43 (H) mg/dL      Creatinine 2.13 (H) mg/dL      Sodium 139 mmol/L      Potassium 3.7 mmol/L      Chloride 99 mmol/L      CO2 28.2 mmol/L      Calcium 8.5 mg/dL      eGFR Non African Amer 22 (L) mL/min/1.73      BUN/Creatinine Ratio 20.2     Anion Gap 11.8 (H) mmol/L     Narrative:       The MDRD GFR formula is only valid for adults with stable renal function between ages 18 and 70.    Osmolality, Calculated [[id number]]  (Normal) Collected:  01/03/18 0312    Specimen:  Blood Updated:  01/03/18 0342     Osmolality Calc 288.6 mOsm/kg         Imaging Results (last 24 hours)     Procedure Component Value Units Date/Time    XR Chest 1 View [[id number]] Collected:  01/02/18 1031     Updated:  01/02/18 1034    Narrative:       XR CHEST 1 VW-     CLINICAL INDICATION: difficulty breathing          COMPARISON: 12/31/2017      TECHNIQUE: Single frontal view of the chest.     FINDINGS:     Left effusion and left-sided consolidation  The cardiac silhouette is normal. The pulmonary vasculature is  unremarkable.  There is no evidence of an acute osseous abnormality.   There are no suspicious-appearing parenchymal soft tissue nodules.             Impression:       Left effusion and left-sided consolidation         This report was finalized on 1/2/2018 10:32 AM by Dr. Bob Lovell MD.           Assessment:Active Problems:    CHF (congestive heart failure)   AKA on chronic kidney disease for/systolic CHF  Plan: We'll go ahead and have Dr. Bridges evaluate the patient to see if she would benefit from thoracentesis and check a CBC and ABG

## 2018-01-03 NOTE — PLAN OF CARE
Problem: Cardiac: Heart Failure (Adult)  Goal: Signs and Symptoms of Listed Potential Problems Will be Absent or Manageable (Cardiac: Heart Failure)  Outcome: Ongoing (interventions implemented as appropriate)      Problem: Fall Risk (Adult)  Goal: Absence of Falls  Outcome: Ongoing (interventions implemented as appropriate)      Problem: Patient Care Overview (Adult)  Goal: Plan of Care Review  Outcome: Ongoing (interventions implemented as appropriate)      Problem: Pressure Ulcer Risk (Diogo Scale) (Adult,Obstetrics,Pediatric)  Goal: Skin Integrity  Outcome: Ongoing (interventions implemented as appropriate)

## 2018-01-03 NOTE — THERAPY EVALUATION
Acute Care - Speech Language Pathology   Swallow Initial Evaluation  [place]   CLINICAL DYSPHAGIA EVALUATION     Patient Name: Tre Kinney  : 1932  MRN: [id number]  Today's Date: 1/3/2018  Onset of Illness/Injury or Date of Surgery Date: 18  Admit Date: 2017    Tre Kinney  is seen at bedside this pm on 3S to assess safety/efficacy of swallowing fnx, determine safest/least restrictive diet tolerance. She denies any overt s/s aspiration w/ po intake.     Social History     Social History   • Marital status:      Spouse name: N/A   • Number of children: N/A   • Years of education: N/A     Occupational History   • Not on file.     Social History Main Topics   • Smoking status: Never Smoker   • Smokeless tobacco: Not on file   • Alcohol use No   • Drug use: No   • Sexual activity: Defer     Other Topics Concern   • Not on file     Social History Narrative      Chest xray 18 reveals L effusion and L consolidation.     Diet Orders (active)     Start     Ordered    18 1800  Dietary Nutrition Supplements Milkshake  Daily With Dinner     Comments:  Дмитрий milkshake with dinner    18 1614    17 1147  Diet Regular; Cardiac  Diet Effective Now      17 1147        Pt is observed on 2L O2 via NC w/o complications.     Pt is positioned upright and centered in a chair at beside to accept multiple po presentations of solid cracker, puree and thin liquids via spoon, cup and straw.  Pt is able to self feed.     Facial/oral structures are symmetrical upon observation. Lingual protrusion reveals no deviation. Oral mucosa are moist, pink, and clean. Secretions are clear, thin, and well controlled. OROM/REE is wfl to imitate oral postures. Gag is not assessed. Volitional cough is intact w/ adequate  intensity, congestive in quality, non-productive. She denies productive cough. Voice is adequate in intensity, clear in quality w/ intelligible speech.    Upon po presentations,  adequate bolus anticipation and acceptance w/ good labial seal for bolus clearance via spoon bowl, cup rim stability and suction via straw. Bolus formation, manipulation and control are wfl w/ rotary mastication pattern. A-p transit is timely w/o oral residue. No overt s/s aspiration before the swallow.    Pharyngeal swallow is timely w/ adequate hyolaryngeal elevation per palpation. No overt s/s aspiration evidenced across this evaluation. No silent aspiration suspected as pt is w/o changes in vocal quality, respirations or secretions post po presentations. Pt denies odynophagia.    Impression: Per this evaluation, pt presents w/ wfl oropharyngeal swallow w/o s/s aspiration. No s/s indicative of silent aspiration. No odynophagia reported.      Recommendations:  1. Regular consistency, thin liquids.    2. Meds whole in puree/thins.   3. Upright and centered for all po intake  4. KARLO precautions.  5. Oral care protocol.  No further formal SLP f/u warranted at this time.    D/w pt results and recommendations w/ verbal agreement.    Thank you for allowing me to participate in the care of your patient-  Cathleen Mauro M.A., CCC-SLP    Visit Dx:   No diagnosis found.  Patient Active Problem List   Diagnosis   • Acid reflux   • Ankle arthralgia   • Cardiac conduction disorder   • Closed fracture of distal fibula   • Arteriosclerosis of coronary artery   • Fracture of distal end of tibia   • Elevated cholesterol   • BP (high blood pressure)   • Hypertrophic polyarthritis   • OP (osteoporosis)   • Right heart failure   • Heart failure, chronic, right-sided   • SOB (shortness of breath)   • Palpitations   • Acute CHF   • CHF (congestive heart failure)   • Congestive heart failure   • Precordial pain     Past Medical History:   Diagnosis Date   • Arthritis    • CHF (congestive heart failure)    • Coronary artery disease    • H/O blood clots    • History of transfusion     no reaction    • Hypertension      Past Surgical  History:   Procedure Laterality Date   • ABDOMINAL SURGERY     • APPENDECTOMY     • CARDIAC SURGERY     •  SECTION     • CORONARY ANGIOPLASTY WITH STENT PLACEMENT     • PACEMAKER IMPLANTATION      left side    • SKIN BIOPSY     • TUBAL ABDOMINAL LIGATION       EDUCATION  The patient has been educated in the following areas:   Dysphagia (Swallowing Impairment).    SLP Recommendation and Plan  No further SLP f/u warranted at this time.      Time Calculation:     Therapy Charges for Today     Code Description Service Date Service Provider Modifiers Qty    49955224400 HC ST SWALLOWING CURRENT STATUS 1/3/2018 Cathleen Mauro MA,CCC-SLP GN,  1    16641364837 HC ST SWALLOWING PROJECTED 1/3/2018 Cathleen Mauro MA,CCC-SLP GN,  1    84591426823 HC ST SWALLOWING DISCHARGE 1/3/2018 Cathleen Mauro MA,CCC-SLP GN,  1    31984476707 HC ST EVAL ORAL PHARYNG SWALLOW 2 1/3/2018 Cathleen Mauro MA,CCC-SLP GN 1        SLP G-Codes  Functional Limitations: Swallowing  Swallow Current Status (): 0 percent impaired, limited or restricted  Swallow Goal Status (): 0 percent impaired, limited or restricted  Swallow Discharge Status (): 0 percent impaired, limited or restricted    Cathleen Mauro MA,JOSE-SLP  1/3/2018

## 2018-01-03 NOTE — PLAN OF CARE
Problem: Cardiac Output, Decreased (Adult)  Goal: Adequate Cardiac Output/Effective Tissue Perfusion  Outcome: Ongoing (interventions implemented as appropriate)      Problem: Cardiac: Heart Failure (Adult)  Goal: Signs and Symptoms of Listed Potential Problems Will be Absent or Manageable (Cardiac: Heart Failure)  Outcome: Ongoing (interventions implemented as appropriate)      Problem: Fall Risk (Adult)  Goal: Absence of Falls  Outcome: Ongoing (interventions implemented as appropriate)      Problem: Patient Care Overview (Adult)  Goal: Plan of Care Review  Outcome: Ongoing (interventions implemented as appropriate)    Goal: Adult Individualization and Mutuality  Outcome: Ongoing (interventions implemented as appropriate)    Goal: Discharge Needs Assessment  Outcome: Ongoing (interventions implemented as appropriate)      Problem: Pressure Ulcer Risk (Diogo Scale) (Adult,Obstetrics,Pediatric)  Goal: Identify Related Risk Factors and Signs and Symptoms  Outcome: Ongoing (interventions implemented as appropriate)    Goal: Skin Integrity  Outcome: Ongoing (interventions implemented as appropriate)

## 2018-01-03 NOTE — PROGRESS NOTES
LOS: 8 days   Patient Care Team:  Myles Barone MD as PCP - General  Myles Barone MD as PCP - Family Medicine  Kelli Márquez, RN as Care Coordinator (Population Health)      Subjective     Admission information:    The patient is an 85-year-old white female with a history of coronary artery disease, congestive heart failure, acute on chronic renal failure, gastroesophageal reflux disease, hypertension, dyslipidemia and previous history of DVTs.  According to the patient she started the current episode approximately one week ago.  She states she tried to put off coming to the hospital because of the holidays.  However her shortness of breath and lower extremity edema continued to worsen until she decided to come in 2 days ago.  She states that over that time she has been extremely short of breath and has had weeping lower extremity edema to thighs.  She also states she was previously having chest pain although she has not had any chest pain recently.  She is supposed to undergo a stress test and a Holter but she did not complete these tests.  She has been started on diuretics by nephrology but her renal function has continued to worsen.  Cardiology was consulted for further evaluation.    Interval History:     The patient states her shortness breath is improved.  She appears to be very dry at this point.  She does continue to complain of cough.    History taken from: patient chart    Vital Signs  Temp:  [97.8 °F (36.6 °C)-100.7 °F (38.2 °C)] 98.2 °F (36.8 °C)  Heart Rate:  [78-83] 81  Resp:  [18-20] 20  BP: (121-154)/(52-87) 134/67    Physical Exam:     Physical Exam   Constitutional: She is oriented to person, place, and time. She appears well-developed and well-nourished.   Obese white female lying comfortably on bed.   HENT:   Mouth/Throat: Oropharynx is clear and moist.   Eyes: EOM are normal. Pupils are equal, round, and reactive to light.   Neck: Neck supple. No JVD present. No tracheal  deviation present. No thyromegaly present.   Cardiovascular: Normal rate, regular rhythm, S1 normal and S2 normal.  Exam reveals no gallop and no friction rub.    No murmur heard.  Pulmonary/Chest: Effort normal. No respiratory distress. She has no wheezes. She has rales.   Abdominal: Soft. Bowel sounds are normal. She exhibits no mass. There is no tenderness.   Musculoskeletal: Normal range of motion. She exhibits no edema.   Lymphadenopathy:     She has no cervical adenopathy.   Neurological: She is alert and oriented to person, place, and time.   Skin: Skin is warm and dry. No rash noted.   Psychiatric: She has a normal mood and affect.       Results Review:     I reviewed the patient's new clinical results.  I reviewed the patient's new imaging results and agree with the interpretation.  I reviewed the patient's other test results and agree with the interpretation  I personally viewed and interpreted the patient's EKG/Telemetry data    Medication:  Scheduled Meds:    aspirin 81 mg Oral Daily   atorvastatin 40 mg Oral Nightly   citalopram 20 mg Oral Daily   furosemide 40 mg Oral BID   gabapentin 400 mg Oral Nightly   hydrALAZINE 25 mg Oral Q12H   ipratropium-albuterol 3 mL Nebulization 4x Daily - RT   isosorbide mononitrate 60 mg Oral Daily   metoprolol succinate  mg Oral Q24H   pantoprazole 40 mg Oral Daily   piperacillin-tazobactam 2.25 g Intravenous Q6H   spironolactone 25 mg Oral Daily   vancomycin 1,000 mg Intravenous Once   vitamin d 5,000 Units Oral Daily   warfarin 6 mg Oral Daily     Continuous Infusions:   PRN Meds:.•  ALPRAZolam  •  HYDROcodone-acetaminophen  •  phenol    Telemetry: Ventricular pacing in the 80s.      Assessment/Plan     1.  Congestive heart failure: Patient with acute on chronic systolic congestive heart failure.  Her last echocardiogram showed an ejection fraction of 36-40%.  She is already on a beta blocker and ACE inhibitor.   She is not a candidate for an ACE inhibitor.  She  is currently on BiDil equivalent with isosorbide mononitrate and hydralazine.  She is undergoing diuresis with Lasix by nephrology.     2.  Acute on chronic renal failure: Patient with acute on chronic renal failure that is currently being managed by nephrology.     3.  ASCVD: Patient with a history of ASCVD previously on full therapy for coronary artery disease.  Most of the has been discontinued at this point.     4.  Dyslipidemia: Patient with history of dyslipidemia as well as to be beneficial adequate control.     5.  Chest pain: Patient with a previous history of chest pain concerning for worsening coronary artery disease she was supposed to undergo a stress test which she did not complete as an outpatient.    6.  Hypertension: Patient with a history of hypertension presented to be hypertensive today.  Her hydralazine dose was decreased by nephrology.  Will defer management of his condition to them.    7.  COPD: Patient with a history of COPD with moderate obstructive disease on recent pulmonary function tests.     Plan:     1.  Congestive heart failure:  The patient is currently on a beta blocker and BiDil equivalent.  She is also on spironolactone for diastolic heart failure.  The patient appears to be fairly well compensated from heart failure standpoint.     2.  ASCVD:  the patient appears to be stable on current regimen.  At this point will continue.    Disposition: The patient appears to be well compensated from a cardiology standpoint.  At this point will sign off.  I appreciate opportunity to participate in this patient's cardiac vascular care.  Please on hesitate to call with questions.    Nba Arora MD  01/03/18  1:16 PM    Dragon disclaimer:  Much of this encounter note is an electronic transcription/translation of spoken language to printed text. The electronic translation of spoken language may permit erroneous, or at times, nonsensical words or phrases to be inadvertently  transcribed; Although I have reviewed the note for such errors, some may still exist.

## 2018-01-03 NOTE — PROGRESS NOTES
Nephrology  Note      Subjective       She is feeling weak and is wheezing. Respiratory therapist at bedside performing ABG. She complains of cough and yellow sputum. No nausea, vomiting or  Diarrhea. She was febrile yesterday    Objective     Vital Signs  Temp:  [97.8 °F (36.6 °C)-100.7 °F (38.2 °C)] 98.2 °F (36.8 °C)  Heart Rate:  [78-83] 81  Resp:  [18-20] 20  BP: (121-154)/(52-87) 134/67    I/O this shift:  In: 120 [P.O.:120]  Out: -   I/O last 3 completed shifts:  In: -   Out: 3050 [Urine:3050]    Physical Examination:    General Appearance : alert,  no distress  Head : normocephalic  Eyes :  no pallor   Throat : oral mucosa moist  Neck:  no JVD  Lungs : wheezing,  reduced breath sounds at left base  Heart : regular rhythm & normal rate, normal S1, S2, no murmur  Abdomen :  normal bowel sounds, no masses and soft non-tender  Extremities :  trace edema  Neurologic : orientated to person, place, time, grossly no focal deficitis    Laboratory Data :      WBC WBC   Date Value Ref Range Status   01/03/2018 10.06 4.50 - 12.50 10*3/mm3 Final   01/02/2018 7.09 4.50 - 12.50 10*3/mm3 Final      HGB Hemoglobin   Date Value Ref Range Status   01/03/2018 9.7 (L) 12.0 - 16.0 g/dL Final   01/02/2018 10.2 (L) 12.0 - 16.0 g/dL Final      HCT Hematocrit   Date Value Ref Range Status   01/03/2018 31.0 (L) 37.0 - 47.0 % Final   01/02/2018 32.5 (L) 37.0 - 47.0 % Final      Platlets No results found for: LABPLAT   MCV MCV   Date Value Ref Range Status   01/03/2018 82.2 80.0 - 94.0 fL Final   01/02/2018 83.5 80.0 - 94.0 fL Final          Sodium Sodium   Date Value Ref Range Status   01/03/2018 139 135 - 153 mmol/L Final   01/02/2018 136 135 - 153 mmol/L Final   01/02/2018 136 135 - 153 mmol/L Final   01/01/2018 139 135 - 153 mmol/L Final      Potassium Potassium   Date Value Ref Range Status   01/03/2018 3.7 3.5 - 5.3 mmol/L Final   01/02/2018 3.8 3.5 - 5.3 mmol/L Final   01/02/2018 3.8 3.5 - 5.3 mmol/L Final   01/01/2018 3.5 3.5 -  5.3 mmol/L Final      Chloride Chloride   Date Value Ref Range Status   01/03/2018 99 99 - 112 mmol/L Final   01/02/2018 95 (L) 99 - 112 mmol/L Final   01/02/2018 95 (L) 99 - 112 mmol/L Final   01/01/2018 101 99 - 112 mmol/L Final      CO2 CO2   Date Value Ref Range Status   01/03/2018 28.2 24.3 - 31.9 mmol/L Final   01/02/2018 30.0 24.3 - 31.9 mmol/L Final   01/02/2018 30.0 24.3 - 31.9 mmol/L Final   01/01/2018 30.2 24.3 - 31.9 mmol/L Final      BUN BUN   Date Value Ref Range Status   01/03/2018 43 (H) 7 - 21 mg/dL Final   01/02/2018 43 (H) 7 - 21 mg/dL Final   01/02/2018 43 (H) 7 - 21 mg/dL Final   01/01/2018 43 (H) 7 - 21 mg/dL Final      Creatinine Creatinine   Date Value Ref Range Status   01/03/2018 2.13 (H) 0.43 - 1.29 mg/dL Final   01/02/2018 2.29 (H) 0.43 - 1.29 mg/dL Final   01/02/2018 2.29 (H) 0.43 - 1.29 mg/dL Final   01/01/2018 2.26 (H) 0.43 - 1.29 mg/dL Final      Calcium Calcium   Date Value Ref Range Status   01/03/2018 8.5 7.7 - 10.0 mg/dL Final   01/02/2018 8.8 7.7 - 10.0 mg/dL Final   01/02/2018 8.8 7.7 - 10.0 mg/dL Final   01/01/2018 8.5 7.7 - 10.0 mg/dL Final      PO4 No results found for: CAPO4   Albumin Albumin   Date Value Ref Range Status   01/02/2018 3.70 3.40 - 4.80 g/dL Final      Magnesium Magnesium   Date Value Ref Range Status   01/02/2018 2.0 1.7 - 2.6 mg/dL Final      Uric Acid No results found for: URICACID     Radiology results :     Imaging Results (last 72 hours)     ** No results found for the last 72 hours. **        Imaging Results (last 24 hours)     ** No results found for the last 24 hours. **            Medications:        aspirin 81 mg Oral Daily   atorvastatin 40 mg Oral Nightly   citalopram 20 mg Oral Daily   furosemide 40 mg Oral BID   gabapentin 400 mg Oral Nightly   hydrALAZINE 25 mg Oral Q12H   ipratropium-albuterol 3 mL Nebulization 4x Daily - RT   isosorbide mononitrate 60 mg Oral Daily   metoprolol succinate  mg Oral Q24H   pantoprazole 40 mg Oral Daily    spironolactone 25 mg Oral Daily   vitamin d 5,000 Units Oral Daily   warfarin 6 mg Oral Daily          Assessment/Plan     Active Problems:    CHF (congestive heart failure)      1. DIANA on CKD 4 : her baseline creatinine has been around 2. 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  She has no proteinuria    2. systolic CHF :  Compensated, ct lasix 40 mg po bid. Mg is 2    3. HTN : keep -150/80s for her age    4. Moderate pulmonary HTN    5. HCAP /fever : will give one dose of vancomyin and start zosyn. Will also get blood cultures and CRP levels. Dr Bridges to see patient today    6. Urinary retention :  UA negative and culture is pending    I discussed the patients findings and my recommendations with patient and nursing staff    Sebas Ellis MD  01/03/18  11:27 AM

## 2018-01-03 NOTE — CONSULTS
Referring Provider: Dr. Barone  Reason for Consultation: Pleural effusion       Chief complaint dyspnea       History of present illness:      Mrs. Kinney is a 85 year old female who was admitted on 12/26/17. She presented to the ER on the day of admission with shortness of breath, generalized weakness and bilateral lower extremity edema. She was found to be in heart failure along with renal failure and was admitted to [address] [place] [address] for further evaluation and treatment. Pulmonary/Critical care is consulted for her left pleural effusion.     Her medical history consist of arthitis, blood clots, CHF, and HTN. She does not smoke and has not history of tobacco use but has been exposed a lot in her lifetime to second hand smoke. She is a full code, her two sons are at bedside during exam.     Review Of Systems:   Review of Systems   Constitutional: Negative for chills, fatigue and fever.   HENT: Negative for congestion and rhinorrhea.    Eyes: Negative for visual disturbance.   Respiratory: Positive for cough and shortness of breath. Negative for wheezing.    Cardiovascular: Negative for chest pain and leg swelling.   Gastrointestinal: Negative for abdominal distention and abdominal pain.   Endocrine: Negative for cold intolerance and heat intolerance.   Genitourinary: Negative for difficulty urinating.   Musculoskeletal: Negative for arthralgias and myalgias.   Skin: Negative for color change.   Allergic/Immunologic: Negative for environmental allergies.   Neurological: Negative for dizziness, weakness and light-headedness.   Hematological: Negative for adenopathy.   Psychiatric/Behavioral: Negative for agitation, behavioral problems and confusion.          History  Past Medical History:   Diagnosis Date   • Arthritis    • CHF (congestive heart failure)    • Coronary artery disease    • H/O blood clots    • History of transfusion     no reaction    • Hypertension    , Past Surgical History:   Procedure Laterality Date    • ABDOMINAL SURGERY     • APPENDECTOMY     • CARDIAC SURGERY     •  SECTION     • CORONARY ANGIOPLASTY WITH STENT PLACEMENT     • PACEMAKER IMPLANTATION      left side    • SKIN BIOPSY     • TUBAL ABDOMINAL LIGATION     , Family History   Problem Relation Age of Onset   • Other Mother      cardiac disorder   • Hypertension Mother    • Heart disease Mother    • Thyroid disease Mother    • Other Father      cardiac disorder   • Heart disease Father    • Hypertension Father    • Other Sister      cardiac disorder   • Other Brother      cardiac disorder   • Other Other      cardiac disorder   • Other Other      cardiac disorder   , Social History   Substance Use Topics   • Smoking status: Never Smoker   • Smokeless tobacco: None   • Alcohol use No   , Prescriptions Prior to Admission   Medication Sig Dispense Refill Last Dose   • ALPRAZolam (XANAX) 0.5 MG tablet Take 0.5 mg by mouth At Night As Needed for Anxiety. Ordered to take bid prn, but patient only takes nightly prn.   2017 at 2000   • Cholecalciferol (VITAMIN D3) 5000 units tablet tablet Take 5,000 Units by mouth Daily.   2017 at 0800   • citalopram (CELEXA) 20 MG tablet Take 20 mg by mouth Daily.   2017 at 0800   • furosemide (LASIX) 40 MG tablet Take 60 mg by mouth 2 (Two) Times a Day.   2017 at 1200   • gabapentin (NEURONTIN) 400 MG capsule Take 400 mg by mouth Every Night.   2017 at    • hydrALAZINE (APRESOLINE) 100 MG tablet Take 1 tablet by mouth Every 8 (Eight) Hours. 90 tablet 0 2017 at 1200   • HYDROcodone-acetaminophen (NORCO) 7.5-325 MG per tablet Take 1 tablet by mouth 2 (Two) Times a Day As Needed for Moderate Pain .   2017 at 0800   • isosorbide mononitrate (IMDUR) 60 MG 24 hr tablet Take 60 mg by mouth Daily.   2017 at 0800   • metoprolol tartrate (LOPRESSOR) 50 MG tablet Take 50 mg by mouth 2 (Two) Times a Day.   2017 at 0800   • pantoprazole (PROTONIX) 40 MG EC tablet Take 40 mg  by mouth Daily.   12/26/2017 at 0800   • potassium chloride (MICRO-K) 10 MEQ CR capsule Take 10 mEq by mouth 2 (Two) Times a Day.   12/26/2017 at 0800   • warfarin (COUMADIN) 4 MG tablet Take 6 mg by mouth Daily.   12/26/2017 at 0800   , Scheduled Meds:    aspirin 81 mg Oral Daily   atorvastatin 40 mg Oral Nightly   citalopram 20 mg Oral Daily   furosemide 40 mg Oral BID   gabapentin 400 mg Oral Nightly   hydrALAZINE 25 mg Oral Q12H   ipratropium-albuterol 3 mL Nebulization 4x Daily - RT   isosorbide mononitrate 60 mg Oral Daily   metoprolol succinate  mg Oral Q24H   pantoprazole 40 mg Oral Daily   spironolactone 25 mg Oral Daily   vitamin d 5,000 Units Oral Daily   warfarin 6 mg Oral Daily   , Continuous Infusions:    and Allergies:  Latex    Objective     Vital Signs   Vitals:    01/03/18 1031   BP: 134/67   Pulse: 81   Resp: 20   Temp: 98.2 °F (36.8 °C)   SpO2: 94%       Physical Exam:               GENERAL APPEARANCE: Well developed, well nourished, alert and cooperative, and appears to be in no acute distress.    HEAD: normocephalic.    EYES: PERRL, EOMI. Fundi normal, vision is grossly intact.    THROAT: Oral cavity and pharynx normal. No inflammation, swelling, exudate, or lesions.     NECK: Neck supple.     CARDIAC: Normal S1 and S2. No S3, S4 or murmurs. Rhythm is regular. There is no peripheral edema, cyanosis or pallor. Extremities are warm and well perfused. Capillary refill is less than 2 seconds. No carotid bruits.    Respiratory: Clear to auscultation without rales, rhonchi, wheezing or diminished breath sounds.    GI: Positive bowel sounds. Soft, nondistended, nontender.     Musculoskeletal: No significant deformity or joint abnormality. No edema. Peripheral pulses intact. No varicosities.    NEUROLOGICAL: Strength and sensation symmetric and intact throughout.     PSYCHIATRIC: The mental examination revealed the patient was oriented to person, place, and time.                Results  Review:    LABS:    Lab Results   Component Value Date    GLUCOSE 102 01/03/2018    BUN 43 (H) 01/03/2018    CREATININE 2.13 (H) 01/03/2018    EGFRIFNONA 22 (L) 01/03/2018    BCR 20.2 01/03/2018    CO2 28.2 01/03/2018    CALCIUM 8.5 01/03/2018    PROTENTOTREF 5.7 (L) 06/11/2017    ALBUMIN 3.70 01/02/2018    LABIL2 1.3 (L) 01/02/2018    AST 22 01/02/2018    ALT 14 01/02/2018    WBC 10.06 01/03/2018    HGB 9.7 (L) 01/03/2018    HCT 31.0 (L) 01/03/2018    MCV 82.2 01/03/2018     (L) 01/03/2018     01/03/2018    K 3.7 01/03/2018    CL 99 01/03/2018    ANIONGAP 11.8 (H) 01/03/2018       Lab Results   Component Value Date    INR 3.08 (H) 01/03/2018    INR 2.93 (H) 01/02/2018    INR 2.93 (H) 01/01/2018    PROTIME 32.3 (H) 01/03/2018    PROTIME 31.0 (H) 01/02/2018    PROTIME 31.0 (H) 01/01/2018         Results from last 7 days  Lab Units 01/03/18  0312 01/02/18  0540 01/01/18  0521   INR  3.08* 2.93* 2.93*                     I reviewed the patient's new clinical results.  I reviewed the patient's new imaging results and agree with the interpretation.      Assessment/Plan      Respiratory Failure-hypoxic: likely related to fluid overload from heart failure and renal failure. Continue diuresis as tolerated by renal function and BP per nephrology orders. Continue scheduled inhalants and nebulizer's. Continue supplemental oxygen to maintain Sp02 >92-94%. Will order Bipap for HS with settings of 10/6. Will start prednisone 30 mg PO daily for wheezing on exam today.     Will order SLP evaluation for aspiration, universal aspiration precautions.     Chest xray appears to be more atelectasis/consolidation rather than effusion. Continue zosyn.     Cardiac: HR 79, /87, continue continuous ECG and v/s monitoring, maintain MAP >65. BNP ordered for today, will order cardiac enzymes to make sure she hasn't had an acute cardiac event.     DIANA on CKD: Creatinine 2.13, 24 hour urine 2.7 liters, continue strict I&O,  electrolytes WNL.     Active Problems:    CHF (congestive heart failure)    I discussed the patients findings and my recommendations with patient and family    LANCE Buckley  01/03/18  11:27 AM    Scribed for Dr. Bridges by LANCE Morrow.     I, Kian Bridges M.D. attest that the above note accurately reflects the work and decisions made  by me.  Patient was seen and evaluated by Dr. Bridges, including history of present illness, physical exam, assessment, and treatment plan.  The above note was reviewed and edited by Dr. Bridges.

## 2018-01-03 NOTE — THERAPY TREATMENT NOTE
Acute Care - Physical Therapy Treatment Note   [place]     Patient Name: Tre Kinney  : 1932  MRN: [id number]  Today's Date: 1/3/2018  Onset of Illness/Injury or Date of Surgery Date: 18  Date of Referral to PT: 18  Referring Physician: Dr. Barone    Admit Date: 2017    Visit Dx:  No diagnosis found.  Patient Active Problem List   Diagnosis   • Acid reflux   • Ankle arthralgia   • Cardiac conduction disorder   • Closed fracture of distal fibula   • Arteriosclerosis of coronary artery   • Fracture of distal end of tibia   • Elevated cholesterol   • BP (high blood pressure)   • Hypertrophic polyarthritis   • OP (osteoporosis)   • Right heart failure   • Heart failure, chronic, right-sided   • SOB (shortness of breath)   • Palpitations   • Acute CHF   • CHF (congestive heart failure)   • Congestive heart failure   • Precordial pain               Adult Rehabilitation Note       18 1558          Rehab Assessment/Intervention    Discipline physical therapy assistant  -RF      Document Type therapy note (daily note)  -RF      Subjective Information agree to therapy  -RF      Patient Effort, Rehab Treatment good  -RF      Symptoms Noted During/After Treatment fatigue;shortness of breath  -RF      Precautions/Limitations fall precautions;oxygen therapy device and L/min  -RF      Patient Response to Treatment Patient demonstrated improved tolerance to treatmwnt with improved funtional mobility noted. Pt demonstrated some SOB, but was able to recover with PLB.   -RF      Recorded by [RF] Kelli Cain PTA      Pain Assessment    Pain Assessment No/denies pain  -RF      Recorded by [RF] Kelli Cain PTA      Cognitive Assessment/Intervention    Current Cognitive/Communication Assessment functional  -RF      Orientation Status oriented to;person;place;situation  -RF      Follows Commands/Answers Questions able to follow single-step instructions;100% of the time;needs cueing;needs  repetition  -RF      Personal Safety mild impairment  -RF      Personal Safety Interventions fall prevention program maintained;muscle strengthening facilitated;gait belt;nonskid shoes/slippers when out of bed  -RF      Recorded by [RF] Kelli Cain PTA      Bed Mobility, Assessment/Treatment    Bed Mobility, Assistive Device bed rails  -RF      Bed Mob, Supine to Sit, [place] moderate assist (50% patient effort);maximum assist (25% patient effort);2 person assist required  -RF      Bed Mob, Sit to Supine, Webb moderate assist (50% patient effort);maximum assist (25% patient effort);2 person assist required  -RF      Bed Mobility, Impairments strength decreased;impaired balance  -RF      Recorded by [RF] Kelli Cain PTA      Transfer Assessment/Treatment    Transfers, Bed-Chair Webb maximum assist (25% patient effort);moderate assist (50% patient effort);verbal cues required;nonverbal cues required (demo/gesture)  -RF      Transfers, Sit-Stand Webb maximum assist (25% patient effort);2 person assist required;moderate assist (50% patient effort);nonverbal cues required (demo/gesture);verbal cues required  -RF      Transfers, Stand-Sit Webb maximum assist (25% patient effort);2 person assist required;moderate assist (50% patient effort);nonverbal cues required (demo/gesture);verbal cues required  -RF      Transfers, Sit-Stand-Sit, Assist Device rolling walker  -RF      Transfer, Impairments strength decreased;impaired balance  -RF      Recorded by [RF] Kelli Cain PTA      Gait Assessment/Treatment    Gait, Webb Level moderate assist (50% patient effort);maximum assist (25% patient effort);nonverbal cues required (demo/gesture);verbal cues required  -RF      Gait, Assistive Device rolling walker  -RF      Gait, Distance (Feet) 2  -RF      Gait, Impairments strength decreased;impaired balance  -RF      Gait, Comment Pt able to transfer from bed to chair with  few steps. Cues for technique and LE advancement required.   -RF      Recorded by [RF] Kelli Cani PTA      Therapy Exercises    Bilateral Lower Extremities AROM:;10 reps;sitting  -RF      Recorded by [RF] Kelli Cain PTA      Positioning and Restraints    Pre-Treatment Position in bed  -RF      Post Treatment Position chair  -RF      In Chair sitting;call light within reach;encouraged to call for assist  -RF      Recorded by [RF] Kelli Cain PTA        User Key  (r) = Recorded By, (t) = Taken By, (c) = Cosigned By    Initials Name Effective Dates    RF Kelli Cain PTA 07/19/17 -                 IP PT Goals       01/02/18 1727          Bed Mobility PT LTG    Bed Mobility PT LTG, Date Established 01/02/18  -BC      Bed Mobility PT LTG, Time to Achieve by discharge  -BC      Bed Mobility PT LTG, Activity Type all bed mobility  -BC      Bed Mobility PT LTG, [place] Level minimum assist (75% patient effort)  -BC      Bed Mobility PT Goal  LTG, Assist Device bed rails  -BC      Transfer Training PT LTG    Transfer Training PT LTG, Date Established 01/02/18  -BC      Transfer Training PT LTG, Time to Achieve by discharge  -BC      Transfer Training PT LTG, Activity Type all transfers  -BC      Transfer Training PT LTG, [place] Level minimum assist (75% patient effort)  -BC      Transfer Training PT LTG, Assist Device walker, rolling  -BC      Gait Training PT LTG    Gait Training Goal PT LTG, Date Established 01/02/18  -BC      Gait Training Goal PT LTG, Time to Achieve by discharge  -BC      Gait Training Goal PT LTG, [place] Level minimum assist (75% patient effort);moderate assist (50% patient effort)  -BC      Gait Training Goal PT LTG, Assist Device walker, rolling  -BC      Gait Training Goal PT LTG, Distance to Achieve 20  -BC        User Key  (r) = Recorded By, (t) = Taken By, (c) = Cosigned By    Initials Name Provider Type    WHITNEY Pike, PT Physical Therapist           Physical Therapy Education     Title: PT OT SLP Therapies (Done)     Topic: Physical Therapy (Done)     Point: Mobility training (Done)    Learning Progress Summary    Learner Readiness Method Response Comment Documented by Status   Patient Acceptance E VU,NR   01/03/18 1602 Done    Acceptance E VU   01/03/18 1059 Done    Acceptance E NR  BC 01/02/18 1726 Active               Point: Home exercise program (Done)    Learning Progress Summary    Learner Readiness Method Response Comment Documented by Status   Patient Acceptance E VU,NR   01/03/18 1602 Done    Acceptance E VU   01/03/18 1059 Done    Acceptance E NR  BC 01/02/18 1726 Active               Point: Body mechanics (Done)    Learning Progress Summary    Learner Readiness Method Response Comment Documented by Status   Patient Acceptance E VU,NR   01/03/18 1602 Done    Acceptance E VU   01/03/18 1059 Done    Acceptance E NR  BC 01/02/18 1726 Active               Point: Precautions (Done)    Learning Progress Summary    Learner Readiness Method Response Comment Documented by Status   Patient Acceptance E VU,NR   01/03/18 1602 Done    Acceptance E VU   01/03/18 1059 Done    Acceptance E NR  BC 01/02/18 1726 Active                      User Key     Initials Effective Dates Name Provider Type Discipline    BC 03/14/16 -  Vandana Pike, PT Physical Therapist PT     01/21/17 -  Valdez Motta, RN Registered Nurse Nurse     07/19/17 -  Kelli Cain PTA Physical Therapy Assistant PT                    PT Recommendation and Plan  Anticipated Equipment Needs At Discharge: front wheeled walker, bedside commode  Anticipated Discharge Disposition: home with home health, home with 24/7 care, inpatient rehabilitation facility, skilled nursing facility  Planned Therapy Interventions: bed mobility training, balance training, gait training, home exercise program, patient/family education, strengthening, transfer training  PT Frequency: 3-5  times/wk, per priority policy             Outcome Measures       01/03/18 1600 01/02/18 1700       How much help from another person do you currently need...    Turning from your back to your side while in flat bed without using bedrails? 2  -RF 2  -BC     Moving from lying on back to sitting on the side of a flat bed without bedrails? 2  -RF 2  -BC     Moving to and from a bed to a chair (including a wheelchair)? 1  -RF 1  -BC     Standing up from a chair using your arms (e.g., wheelchair, bedside chair)? 1  -RF 1  -BC     Climbing 3-5 steps with a railing? 1  -RF 1  -BC     To walk in hospital room? 1  -RF 1  -BC     AM-PAC 6 Clicks Score 8  -RF 8  -BC     Functional Assessment    Outcome Measure Options AM-PAC 6 Clicks Basic Mobility (PT)  -RF AM-PAC 6 Clicks Basic Mobility (PT)  -BC       User Key  (r) = Recorded By, (t) = Taken By, (c) = Cosigned By    Initials Name Provider Type    BC Vandana Pike, PT Physical Therapist    RF Kelli Cain PTA Physical Therapy Assistant           Time Calculation:         PT Charges       01/03/18 1602          Time Calculation    PT Received On 01/03/18  -RF      PT - Next Appointment 01/04/18  -RF      PT Goal Re-Cert Due Date 01/16/18  -RF      Time Calculation- PT    Total Timed Code Minutes- PT 26 minute(s)  -RF        User Key  (r) = Recorded By, (t) = Taken By, (c) = Cosigned By    Initials Name Provider Type    RF Kelli Cain PTA Physical Therapy Assistant          Therapy Charges for Today     Code Description Service Date Service Provider Modifiers Qty    67257802000 HC PT THER PROC EA 15 MIN 1/3/2018 Kelli Cain PTA GP 1    13785924363 HC PT THERAPEUTIC ACT EA 15 MIN 1/3/2018 Kelli Cain PTA GP 1    91371623649 HC PT THER SUPP EA 15 MIN 1/3/2018 Kelli Cain PTA GP 2          PT G-Codes  Outcome Measure Options: AM-PAC 6 Clicks Basic Mobility (PT)  Score: 8  Functional Limitation: Mobility: Walking and moving around  Mobility: Walking  and Moving Around Current Status (): At least 80 percent but less than 100 percent impaired, limited or restricted  Mobility: Walking and Moving Around Goal Status (): At least 60 percent but less than 80 percent impaired, limited or restricted    Kelli Cain, PTA  1/3/2018

## 2018-01-04 ENCOUNTER — EPISODE CHANGES (OUTPATIENT)
Dept: CASE MANAGEMENT | Facility: OTHER | Age: 83
End: 2018-01-04

## 2018-01-04 LAB
ANION GAP SERPL CALCULATED.3IONS-SCNC: 10.3 MMOL/L (ref 3.6–11.2)
BASOPHILS # BLD AUTO: 0.03 10*3/MM3 (ref 0–0.3)
BASOPHILS NFR BLD AUTO: 0.3 % (ref 0–2)
BNP SERPL-MCNC: 3094 PG/ML (ref 0–100)
BUN BLD-MCNC: 48 MG/DL (ref 7–21)
BUN/CREAT SERPL: 21.8 (ref 7–25)
CALCIUM SPEC-SCNC: 8.4 MG/DL (ref 7.7–10)
CHLORIDE SERPL-SCNC: 96 MMOL/L (ref 99–112)
CO2 SERPL-SCNC: 30.7 MMOL/L (ref 24.3–31.9)
CREAT BLD-MCNC: 2.2 MG/DL (ref 0.43–1.29)
CRP SERPL-MCNC: 13.7 MG/DL (ref 0–0.99)
DEPRECATED RDW RBC AUTO: 53.1 FL (ref 37–54)
EOSINOPHIL # BLD AUTO: 0.01 10*3/MM3 (ref 0–0.7)
EOSINOPHIL NFR BLD AUTO: 0.1 % (ref 0–7)
ERYTHROCYTE [DISTWIDTH] IN BLOOD BY AUTOMATED COUNT: 17.1 % (ref 11.5–14.5)
GFR SERPL CREATININE-BSD FRML MDRD: 21 ML/MIN/1.73
GLUCOSE BLD-MCNC: 108 MG/DL (ref 70–110)
HCT VFR BLD AUTO: 33.5 % (ref 37–47)
HGB BLD-MCNC: 10.3 G/DL (ref 12–16)
IMM GRANULOCYTES # BLD: 0.04 10*3/MM3 (ref 0–0.03)
IMM GRANULOCYTES NFR BLD: 0.4 % (ref 0–0.5)
INR PPP: 4.4 (ref 0.9–1.1)
LYMPHOCYTES # BLD AUTO: 0.58 10*3/MM3 (ref 1–3)
LYMPHOCYTES NFR BLD AUTO: 5.7 % (ref 16–46)
MCH RBC QN AUTO: 26.5 PG (ref 27–33)
MCHC RBC AUTO-ENTMCNC: 30.7 G/DL (ref 33–37)
MCV RBC AUTO: 86.3 FL (ref 80–94)
MONOCYTES # BLD AUTO: 0.61 10*3/MM3 (ref 0.1–0.9)
MONOCYTES NFR BLD AUTO: 6 % (ref 0–12)
NEUTROPHILS # BLD AUTO: 8.9 10*3/MM3 (ref 1.4–6.5)
NEUTROPHILS NFR BLD AUTO: 87.5 % (ref 40–75)
OSMOLALITY SERPL CALC.SUM OF ELEC: 287 MOSM/KG (ref 273–305)
PLATELET # BLD AUTO: 140 10*3/MM3 (ref 130–400)
PMV BLD AUTO: 11.5 FL (ref 6–10)
POTASSIUM BLD-SCNC: 3.6 MMOL/L (ref 3.5–5.3)
PROTHROMBIN TIME: 42.7 SECONDS (ref 11–15.4)
RBC # BLD AUTO: 3.88 10*6/MM3 (ref 4.2–5.4)
SODIUM BLD-SCNC: 137 MMOL/L (ref 135–153)
TROPONIN I SERPL-MCNC: 0.29 NG/ML
WBC NRBC COR # BLD: 10.17 10*3/MM3 (ref 4.5–12.5)

## 2018-01-04 PROCEDURE — 85610 PROTHROMBIN TIME: CPT | Performed by: FAMILY MEDICINE

## 2018-01-04 PROCEDURE — 94799 UNLISTED PULMONARY SVC/PX: CPT

## 2018-01-04 PROCEDURE — 97530 THERAPEUTIC ACTIVITIES: CPT

## 2018-01-04 PROCEDURE — 83880 ASSAY OF NATRIURETIC PEPTIDE: CPT | Performed by: NURSE PRACTITIONER

## 2018-01-04 PROCEDURE — 84484 ASSAY OF TROPONIN QUANT: CPT | Performed by: INTERNAL MEDICINE

## 2018-01-04 PROCEDURE — 97110 THERAPEUTIC EXERCISES: CPT

## 2018-01-04 PROCEDURE — 25010000002 PIPERACILLIN-TAZOBACTAM: Performed by: INTERNAL MEDICINE

## 2018-01-04 PROCEDURE — 99233 SBSQ HOSP IP/OBS HIGH 50: CPT | Performed by: INTERNAL MEDICINE

## 2018-01-04 PROCEDURE — 80048 BASIC METABOLIC PNL TOTAL CA: CPT | Performed by: INTERNAL MEDICINE

## 2018-01-04 PROCEDURE — 85025 COMPLETE CBC W/AUTO DIFF WBC: CPT | Performed by: NURSE PRACTITIONER

## 2018-01-04 PROCEDURE — 86140 C-REACTIVE PROTEIN: CPT | Performed by: NURSE PRACTITIONER

## 2018-01-04 RX ORDER — POTASSIUM CHLORIDE 20 MEQ/1
40 TABLET, EXTENDED RELEASE ORAL ONCE
Status: COMPLETED | OUTPATIENT
Start: 2018-01-04 | End: 2018-01-04

## 2018-01-04 RX ADMIN — GABAPENTIN 400 MG: 400 CAPSULE ORAL at 20:27

## 2018-01-04 RX ADMIN — IPRATROPIUM BROMIDE AND ALBUTEROL SULFATE 3 ML: .5; 3 SOLUTION RESPIRATORY (INHALATION) at 00:47

## 2018-01-04 RX ADMIN — PIPERACILLIN AND TAZOBACTAM 2.25 G: 2; .25 INJECTION, POWDER, LYOPHILIZED, FOR SOLUTION INTRAVENOUS; PARENTERAL at 18:07

## 2018-01-04 RX ADMIN — ISOSORBIDE MONONITRATE 60 MG: 60 TABLET, EXTENDED RELEASE ORAL at 08:39

## 2018-01-04 RX ADMIN — SPIRONOLACTONE 25 MG: 25 TABLET ORAL at 08:38

## 2018-01-04 RX ADMIN — PIPERACILLIN AND TAZOBACTAM 2.25 G: 2; .25 INJECTION, POWDER, LYOPHILIZED, FOR SOLUTION INTRAVENOUS; PARENTERAL at 14:08

## 2018-01-04 RX ADMIN — IPRATROPIUM BROMIDE AND ALBUTEROL SULFATE 3 ML: .5; 3 SOLUTION RESPIRATORY (INHALATION) at 06:24

## 2018-01-04 RX ADMIN — IPRATROPIUM BROMIDE AND ALBUTEROL SULFATE 3 ML: .5; 3 SOLUTION RESPIRATORY (INHALATION) at 19:02

## 2018-01-04 RX ADMIN — FUROSEMIDE 40 MG: 40 TABLET ORAL at 17:15

## 2018-01-04 RX ADMIN — ASPIRIN 81 MG: 81 TABLET ORAL at 08:38

## 2018-01-04 RX ADMIN — HYDRALAZINE HYDROCHLORIDE 25 MG: 25 TABLET ORAL at 20:27

## 2018-01-04 RX ADMIN — FUROSEMIDE 40 MG: 40 TABLET ORAL at 08:38

## 2018-01-04 RX ADMIN — ATORVASTATIN CALCIUM 40 MG: 40 TABLET, FILM COATED ORAL at 20:27

## 2018-01-04 RX ADMIN — CHOLECALCIFEROL CAP 125 MCG (5000 UNIT) 5000 UNITS: 125 CAP at 08:38

## 2018-01-04 RX ADMIN — ALPRAZOLAM 0.5 MG: 0.5 TABLET ORAL at 20:27

## 2018-01-04 RX ADMIN — METOPROLOL SUCCINATE 100 MG: 50 TABLET, FILM COATED, EXTENDED RELEASE ORAL at 08:38

## 2018-01-04 RX ADMIN — HYDROCODONE BITARTRATE AND ACETAMINOPHEN 1 TABLET: 7.5; 325 TABLET ORAL at 00:48

## 2018-01-04 RX ADMIN — HYDROCODONE BITARTRATE AND ACETAMINOPHEN 1 TABLET: 7.5; 325 TABLET ORAL at 20:27

## 2018-01-04 RX ADMIN — IPRATROPIUM BROMIDE AND ALBUTEROL SULFATE 3 ML: .5; 3 SOLUTION RESPIRATORY (INHALATION) at 12:58

## 2018-01-04 RX ADMIN — PANTOPRAZOLE SODIUM 40 MG: 40 TABLET, DELAYED RELEASE ORAL at 08:38

## 2018-01-04 RX ADMIN — HYDRALAZINE HYDROCHLORIDE 25 MG: 25 TABLET ORAL at 08:38

## 2018-01-04 RX ADMIN — CITALOPRAM HYDROBROMIDE 20 MG: 20 TABLET ORAL at 08:38

## 2018-01-04 RX ADMIN — PIPERACILLIN AND TAZOBACTAM 2.25 G: 2; .25 INJECTION, POWDER, LYOPHILIZED, FOR SOLUTION INTRAVENOUS; PARENTERAL at 05:10

## 2018-01-04 RX ADMIN — PIPERACILLIN AND TAZOBACTAM 2.25 G: 2; .25 INJECTION, POWDER, LYOPHILIZED, FOR SOLUTION INTRAVENOUS; PARENTERAL at 00:48

## 2018-01-04 RX ADMIN — POTASSIUM CHLORIDE 40 MEQ: 1500 TABLET, EXTENDED RELEASE ORAL at 08:39

## 2018-01-04 NOTE — PROGRESS NOTES
LOS: 9 days     Chief Complaint:  Pulmonology is following for pleural effusion    Subjective     Interval History:     Mrs. Kinney is looking better this morning, breathing is less labored, reports she feels some better. No family present.     History taken from: patient chart    Review of Systems:   Review of Systems   Constitutional: Negative for chills, fatigue and fever.   HENT: Negative for congestion and rhinorrhea.    Eyes: Negative for photophobia and visual disturbance.   Respiratory: Positive for cough and shortness of breath. Negative for wheezing.    Cardiovascular: Negative for chest pain.   Gastrointestinal: Negative for abdominal distention and abdominal pain.   Endocrine: Negative for cold intolerance and heat intolerance.   Genitourinary: Negative for difficulty urinating.   Musculoskeletal: Negative for arthralgias and myalgias.   Skin: Negative for color change.   Allergic/Immunologic: Negative for environmental allergies.   Neurological: Negative for dizziness, weakness and light-headedness.   Psychiatric/Behavioral: Negative for agitation, behavioral problems and confusion.                     Objective     Vital Signs  Temp:  [97.5 °F (36.4 °C)-100.4 °F (38 °C)] 97.5 °F (36.4 °C)  Heart Rate:  [79-96] 81  Resp:  [18-20] 18  BP: (116-152)/(56-91) 129/67  Body mass index is 29.12 kg/(m^2).    Intake/Output Summary (Last 24 hours) at 01/04/18 0917  Last data filed at 01/04/18 0908   Gross per 24 hour   Intake              220 ml   Output             2050 ml   Net            -1830 ml          Physical Exam:  GENERAL APPEARANCE: Well developed, well nourished, alert and cooperative, and appears to be in no acute distress.    HEAD: normocephalic.    EYES: PERRL    NECK: Neck supple.     CARDIAC: Normal S1 and S2. No S3, S4 or murmurs. Rhythm is regular. There is no peripheral edema, cyanosis or pallor. Extremities are warm and well perfused. Capillary refill is less than 2 seconds. No carotid  bruits.no jvd pulses equal bilaterally     Respiratory: Clear to auscultation and percussion without rales, rhonchi, wheezing or diminished breath sounds.    GI: Positive bowel sounds. Soft, nondistended, nontender.     Musculoskeletal: No significant deformity or joint abnormality. No edema. Peripheral pulses intact.     NEUROLOGICAL: Strength and sensation symmetric and intact throughout.     PSYCHIATRIC: The mental examination revealed the patient was oriented to person, place, and time.                 Results Review:                I reviewed the patient's new clinical results.  I reviewed the patient's new imaging results and agree with the interpretation.    Results from last 7 days  Lab Units 01/03/18  0646 01/02/18  0538   WBC 10*3/mm3 10.06 7.09   HEMOGLOBIN g/dL 9.7* 10.2*   PLATELETS 10*3/mm3 101* 150       Results from last 7 days  Lab Units 01/04/18  0037 01/03/18  0312 01/02/18  1405 01/02/18  0536   SODIUM mmol/L 137 139  --  136  136   POTASSIUM mmol/L 3.6 3.7  --  3.8  3.8   CHLORIDE mmol/L 96* 99  --  95*  95*   CO2 mmol/L 30.7 28.2  --  30.0  30.0   BUN mg/dL 48* 43*  --  43*  43*   CREATININE mg/dL 2.20* 2.13*  --  2.29*  2.29*   CALCIUM mg/dL 8.4 8.5  --  8.8  8.8   GLUCOSE mg/dL 108 102  --  121*  121*   MAGNESIUM mg/dL  --   --  2.0  --      Lab Results   Component Value Date    INR 4.40 (H) 01/04/2018    INR 3.08 (H) 01/03/2018    INR 2.93 (H) 01/02/2018    PROTIME 42.7 (H) 01/04/2018    PROTIME 32.3 (H) 01/03/2018    PROTIME 31.0 (H) 01/02/2018       Results from last 7 days  Lab Units 01/02/18  0536   ALK PHOS U/L 53   BILIRUBIN mg/dL 0.7   ALT (SGPT) U/L 14   AST (SGOT) U/L 22       Results from last 7 days  Lab Units 01/03/18  0823   PH, ARTERIAL pH units 7.472*   PO2 ART mm Hg 65.7*   PCO2, ARTERIAL mm Hg 36.0   HCO3 ART mmol/L 25.7     Imaging Results (last 24 hours)     ** No results found for the last 24 hours. **             Medication Review:   Scheduled  Medications:    aspirin 81 mg Oral Daily   atorvastatin 40 mg Oral Nightly   citalopram 20 mg Oral Daily   furosemide 40 mg Oral BID   gabapentin 400 mg Oral Nightly   hydrALAZINE 25 mg Oral Q12H   ipratropium-albuterol 3 mL Nebulization 4x Daily - RT   isosorbide mononitrate 60 mg Oral Daily   metoprolol succinate  mg Oral Q24H   pantoprazole 40 mg Oral Daily   piperacillin-tazobactam 2.25 g Intravenous Q6H   spironolactone 25 mg Oral Daily   vitamin d 5,000 Units Oral Daily   warfarin 6 mg Oral Daily     Continuous infusions:       Assessment/Plan      Respiratory Failure-hypoxic: likely related to fluid overload from heart failure and renal failure. Continue diuresis as tolerated by renals and BP per nephrology. Continue scheduled inhalants and nebulizer's. Continue supplemental oxygen to maintain Sp02 >92-94%. Continue BiPap at hs for and during the day for shortness of breath. Continue PO prednisone, will wean as tolerated.     SLP evaluation was negative for aspiration, would maintain universal aspiration precautions.      Chest xray appears to be more atelectasis/consolidation rather than effusion. Continue zosyn.      Heart failure, positive troponin: HR 81, /67, continue continuous ECG and v/s monitoring, maintain MAP >65. BNP yesterday was 3797, will repeat today. Positive cardiac's yesterday likely related to renal failure and hypoxia, cardiology is on board will await their input.      DIANA on CKD: Creatinine 2.20, 24 hour urine 2 liters, continue strict I&O, electrolytes WNL.       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 Bridges by LANCE Morrow.     I, Kian Bridges M.D. attest that the above note accurately reflects the work and decisions made  by me.  Patient was seen and evaluated by Dr. Bridges, including history of present illness, physical exam, assessment, and treatment plan.  The above note was reviewed and edited by Dr. Bridges.

## 2018-01-04 NOTE — PROGRESS NOTES
Tre Kinney 85 y.o.   01/04/18    Subjective: Had a little better day before she was able to sit up but still does not have much of an appetite  Objective: Stable MAXIMUM TEMPERATURE Was 101.  Bilateral Rhonchi Decreased Breath Sounds Left Lower Lobe  Vital Signs (last 72 hrs)       12/31 0700  -  01/01 0659 01/01 0700  -  01/02 0659 01/02 0700  -  01/03 0659 01/03 0700  -  01/04 0645   Most Recent    Temp (°F) 97.5 -  99.9    98.3 -  99.1    97.8 -  (!)101.1    97.5 -  100.4     97.5 (36.4)    Heart Rate 74 -  80    74 -  98    78 -  83    79 -  96     81    Resp 18 -  20    18 -  20    18 -  20    18 -  20     18    /61 -  133/71    139/73 -  153/83    121/52 -  154/75    116/56 -  152/73     129/67    SpO2 (%) 92 -  98    91 -  98    92 -  95    90 -  97     95        Lab Results (last 72 hours)     Procedure Component Value Units Date/Time    CBC & Differential [[id number]] Collected:  01/02/18 0538    Specimen:  Blood Updated:  01/02/18 0602    Narrative:       The following orders were created for panel order CBC & Differential.  Procedure                               Abnormality         Status                     ---------                               -----------         ------                     CBC Auto Differential[[id number]]        Abnormal            Final result                 Please view results for these tests on the individual orders.    CBC Auto Differential [[id number]]  (Abnormal) Collected:  01/02/18 0538    Specimen:  Blood Updated:  01/02/18 0602     WBC 7.09 10*3/mm3      RBC 3.89 (L) 10*6/mm3      Hemoglobin 10.2 (L) g/dL      Hematocrit 32.5 (L) %      MCV 83.5 fL      MCH 26.2 (L) pg      MCHC 31.4 (L) g/dL      RDW 16.5 (H) %      RDW-SD 49.9 fl      MPV 10.5 (H) fL      Platelets 150 10*3/mm3      Neutrophil % 88.1 (H) %      Lymphocyte % 6.2 (L) %      Monocyte % 5.4 %      Eosinophil % 0.1 %      Basophil % 0.1 %      Immature Grans % 0.1 %      Neutrophils, Absolute 6.24  10*3/mm3      Lymphocytes, Absolute 0.44 (L) 10*3/mm3      Monocytes, Absolute 0.38 10*3/mm3      Eosinophils, Absolute 0.01 10*3/mm3      Basophils, Absolute 0.01 10*3/mm3      Immature Grans, Absolute 0.01 10*3/mm3     Protime-INR [[id number]]  (Abnormal) Collected:  01/02/18 0540    Specimen:  Blood Updated:  01/02/18 0630     Protime 31.0 (H) Seconds       Note new Reference Range        INR 2.93 (H)    Narrative:       Suggested INR therapeutic range for stable oral anticoagulant therapy:    Low Intensity therapy:   1.5-2.0  Moderate Intensity therapy:   2.0-3.0  High Intensity therapy:   2.5-4.0    Basic Metabolic Panel [[id number]]  (Abnormal) Collected:  01/02/18 0536    Specimen:  Blood Updated:  01/02/18 0631     Glucose 121 (H) mg/dL      BUN 43 (H) mg/dL      Creatinine 2.29 (H) mg/dL      Sodium 136 mmol/L      Potassium 3.8 mmol/L      Chloride 95 (L) mmol/L      CO2 30.0 mmol/L      Calcium 8.8 mg/dL      eGFR Non African Amer 20 (L) mL/min/1.73      BUN/Creatinine Ratio 18.8     Anion Gap 11.0 mmol/L     Narrative:       The MDRD GFR formula is only valid for adults with stable renal function between ages 18 and 70.    Comprehensive Metabolic Panel [[id number]]  (Abnormal) Collected:  01/02/18 0536    Specimen:  Blood Updated:  01/02/18 0632     Glucose 121 (H) mg/dL      BUN 43 (H) mg/dL      Creatinine 2.29 (H) mg/dL      Sodium 136 mmol/L      Potassium 3.8 mmol/L      Chloride 95 (L) mmol/L      CO2 30.0 mmol/L      Calcium 8.8 mg/dL      Total Protein 6.6 g/dL      Albumin 3.70 g/dL      ALT (SGPT) 14 U/L      AST (SGOT) 22 U/L      Alkaline Phosphatase 53 U/L      Total Bilirubin 0.7 mg/dL      eGFR Non African Amer 20 (L) mL/min/1.73      Globulin 2.9 gm/dL      A/G Ratio 1.3 (L) g/dL      BUN/Creatinine Ratio 18.8     Anion Gap 11.0 mmol/L     Narrative:       The MDRD GFR formula is only valid for adults with stable renal function between ages 18 and 70.    Osmolality, Calculated [[id number]]   (Normal) Collected:  01/02/18 0536    Specimen:  Blood Updated:  01/02/18 0632     Osmolality Calc 284.0 mOsm/kg     Blood Gas, Arterial [[id number]]  (Abnormal) Collected:  01/02/18 1028    Specimen:  Arterial Blood Updated:  01/02/18 1040     Site Arterial: right radial     Devang's Test Positive     pH, Arterial 7.470 (H) pH units      pCO2, Arterial 40.4 mm Hg      pO2, Arterial 53.8 (C) mm Hg      HCO3, Arterial 28.7 (H) mmol/L      Base Excess, Arterial 4.7 mmol/L      O2 Saturation, Arterial 88.2 (L) %      Hemoglobin, Blood Gas 10.0 (L) g/dL      Hematocrit, Blood Gas 29.0 (L) %      Oxyhemoglobin 86.8 %      Methemoglobin 0.20 %      Carboxyhemoglobin 1.4 %      A-a Gradiant 120.4 mmHg      Temperature 98.6 C      Barometric Pressure for Blood Gas 739 mmHg      Modality Cannula - Nasal     FIO2 32 %     Magnesium [[id number]]  (Normal) Collected:  01/02/18 1405    Specimen:  Blood Updated:  01/02/18 1518     Magnesium 2.0 mg/dL     Protime-INR [[id number]]  (Abnormal) Collected:  01/03/18 0312    Specimen:  Blood Updated:  01/03/18 0339     Protime 32.3 (H) Seconds       Note new Reference Range        INR 3.08 (H)    Narrative:       Suggested INR therapeutic range for stable oral anticoagulant therapy:    Low Intensity therapy:   1.5-2.0  Moderate Intensity therapy:   2.0-3.0  High Intensity therapy:   2.5-4.0    Basic Metabolic Panel [[id number]]  (Abnormal) Collected:  01/03/18 0312    Specimen:  Blood Updated:  01/03/18 0342     Glucose 102 mg/dL      BUN 43 (H) mg/dL      Creatinine 2.13 (H) mg/dL      Sodium 139 mmol/L      Potassium 3.7 mmol/L      Chloride 99 mmol/L      CO2 28.2 mmol/L      Calcium 8.5 mg/dL      eGFR Non African Amer 22 (L) mL/min/1.73      BUN/Creatinine Ratio 20.2     Anion Gap 11.8 (H) mmol/L     Narrative:       The MDRD GFR formula is only valid for adults with stable renal function between ages 18 and 70.    Osmolality, Calculated [[id number]]  (Normal) Collected:  01/03/18 0312     Specimen:  Blood Updated:  01/03/18 0342     Osmolality Calc 288.6 mOsm/kg     CBC & Differential [[id number]] Collected:  01/03/18 0646    Specimen:  Blood Updated:  01/03/18 0700    Narrative:       The following orders were created for panel order CBC & Differential.  Procedure                               Abnormality         Status                     ---------                               -----------         ------                     CBC Auto Differential[[id number]]        Abnormal            Final result                 Please view results for these tests on the individual orders.    CBC Auto Differential [[id number]]  (Abnormal) Collected:  01/03/18 0646    Specimen:  Blood Updated:  01/03/18 0700     WBC 10.06 10*3/mm3      RBC 3.77 (L) 10*6/mm3      Hemoglobin 9.7 (L) g/dL      Hematocrit 31.0 (L) %      MCV 82.2 fL      MCH 25.7 (L) pg      MCHC 31.3 (L) g/dL      RDW 16.8 (H) %      RDW-SD 49.6 fl      MPV 10.2 (H) fL      Platelets 101 (L) 10*3/mm3      Neutrophil % 71.6 %      Lymphocyte % 13.2 (L) %      Monocyte % 14.5 (H) %      Eosinophil % 0.0 %      Basophil % 0.3 %      Immature Grans % 0.4 %      Neutrophils, Absolute 7.20 (H) 10*3/mm3      Lymphocytes, Absolute 1.33 10*3/mm3      Monocytes, Absolute 1.46 (H) 10*3/mm3      Eosinophils, Absolute 0.00 10*3/mm3      Basophils, Absolute 0.03 10*3/mm3      Immature Grans, Absolute 0.04 (H) 10*3/mm3      nRBC 0.0 /100 WBC     Urine Culture - Urine, Urine, Clean Catch [[id number]]  (Normal) Collected:  01/02/18 0935    Specimen:  Urine from Urine, Clean Catch Updated:  01/03/18 0729     Urine Culture Culture in progress    Blood Gas, Arterial [[id number]]  (Abnormal) Collected:  01/03/18 0823    Specimen:  Arterial Blood Updated:  01/03/18 0847     Site Arterial: left brachial     Devang's Test N/A     pH, Arterial 7.472 (H) pH units      pCO2, Arterial 36.0 mm Hg      pO2, Arterial 65.7 (L) mm Hg      HCO3, Arterial 25.7 mmol/L      Base Excess,  Arterial 2.2 mmol/L      O2 Saturation, Arterial 92.3 %      Hemoglobin, Blood Gas 11.4 (L) g/dL      Hematocrit, Blood Gas 34.0 (L) %      Oxyhemoglobin 90.7 %      Methemoglobin 0.10 %      Carboxyhemoglobin 1.6 %      A-a Gradiant 138.1 mmHg      Temperature 98.6 C      Barometric Pressure for Blood Gas 729 mmHg      Modality Cannula - Nasal     FIO2 36 %     C-reactive Protein [[id number]]  (Abnormal) Collected:  01/03/18 1204    Specimen:  Blood Updated:  01/03/18 1324     C-Reactive Protein 10.63 (H) mg/dL     CK [[id number]]  (Normal) Collected:  01/03/18 1204    Specimen:  Blood Updated:  01/03/18 1330     Creatine Kinase 42 U/L     CK-MB [[id number]]  (Normal) Collected:  01/03/18 1204    Specimen:  Blood Updated:  01/03/18 1332     CKMB <0.18 ng/mL     CK-MB Index [[id number]] Collected:  01/03/18 1204    Specimen:  Blood Updated:  01/03/18 1332     CK-MB Index -- %       Unable to calculate.       BNP [[id number]]  (Abnormal) Collected:  01/03/18 1204    Specimen:  Blood Updated:  01/03/18 1332     BNP 3797.0 (H) pg/mL     Troponin [[id number]]  (Abnormal) Collected:  01/03/18 1204    Specimen:  Blood Updated:  01/03/18 1332     Troponin I 0.310 (H) ng/mL     Narrative:       Ultra Troponin I Reference Range:         <=0.039 ng/mL: Negative    0.04-0.779 ng/mL: Indeterminate Range. Suspicious of MI.  Clinical correlation required.       >=0.78  ng/mL: Consistent with myocardial injury.  Clinical correlation required.    Basic Metabolic Panel [[id number]]  (Abnormal) Collected:  01/04/18 0037    Specimen:  Blood Updated:  01/04/18 0133     Glucose 108 mg/dL      BUN 48 (H) mg/dL      Creatinine 2.20 (H) mg/dL      Sodium 137 mmol/L      Potassium 3.6 mmol/L       1+ Hemolysis         Chloride 96 (L) mmol/L      CO2 30.7 mmol/L      Calcium 8.4 mg/dL      eGFR Non African Amer 21 (L) mL/min/1.73      BUN/Creatinine Ratio 21.8     Anion Gap 10.3 mmol/L     Narrative:       The MDRD GFR formula is only valid for  adults with stable renal function between ages 18 and 70.    Osmolality, Calculated [[id number]]  (Normal) Collected:  01/04/18 0037    Specimen:  Blood Updated:  01/04/18 0133     Osmolality Calc 287.0 mOsm/kg     Troponin [[id number]]  (Abnormal) Collected:  01/04/18 0037    Specimen:  Blood Updated:  01/04/18 0138     Troponin I 0.292 (H) ng/mL     Narrative:       Ultra Troponin I Reference Range:         <=0.039 ng/mL: Negative    0.04-0.779 ng/mL: Indeterminate Range. Suspicious of MI.  Clinical correlation required.       >=0.78  ng/mL: Consistent with myocardial injury.  Clinical correlation required.    Protime-INR [[id number]]  (Abnormal) Collected:  01/04/18 0037    Specimen:  Blood Updated:  01/04/18 0153     Protime 42.7 (H) Seconds       Note new Reference Range        INR 4.40 (H)    Narrative:       Suggested INR therapeutic range for stable oral anticoagulant therapy:    Low Intensity therapy:   1.5-2.0  Moderate Intensity therapy:   2.0-3.0  High Intensity therapy:   2.5-4.0    Blood Culture - Blood, [[id number]]  (Normal) Collected:  01/03/18 1150    Specimen:  Blood from Hand, Right Updated:  01/04/18 0307     Blood Culture No growth at less than 24 hours    Blood Culture - Blood, [[id number]]  (Normal) Collected:  01/03/18 1205    Specimen:  Blood from Hand, Left Updated:  01/04/18 0307     Blood Culture No growth at less than 24 hours        Imaging Results (last 24 hours)     ** No results found for the last 24 hours. **        Assessment:Active Problems:    CHF (congestive heart failure)Patient currently stable from this standpoint   Left lower lobe consolidation/8K on chronic kidney disease for  Plan: Continue with Mirapex and IV antibiotics for left lower lobe consolidation from HCap

## 2018-01-04 NOTE — PROGRESS NOTES
Nutrition Services    Patient Name:  Tre Kinney  YOB: 1932  MRN: [id number]  Admit Date:  12/26/2017    Will d/c cardiac diet per protocol, pt is not eating and dislikes the diet.      Thank you    Electronically signed by:  Sherlyn Avendaño RD  01/04/18 6:18 PM

## 2018-01-04 NOTE — PROGRESS NOTES
Nephrology  Note      Subjective     She is feeling some better today. Evidently did not use BIPAP  last night. She denies nausea or vomiting. She has cough with sputum but did not notice the color    Objective     Vital Signs  Temp:  [97.5 °F (36.4 °C)-100.4 °F (38 °C)] 97.5 °F (36.4 °C)  Heart Rate:  [79-96] 81  Resp:  [18-20] 18  BP: (116-152)/(56-91) 129/67       I/O last 3 completed shifts:  In: 340 [P.O.:240; IV Piggyback:100]  Out: 3750 [Urine:3750]    Physical Examination:    General Appearance : alert,  no distress  Head : normocephalic  Eyes :  no pallor   Throat : oral mucosa moist  Neck:  no JVD  Lungs : wheezing, reduced breath sounds at left base  Heart : regular rhythm & normal rate, normal S1, S2, no murmur  Abdomen :  normal bowel sounds, no masses and soft non-tender  Extremities :  minimal edema  Neurologic : orientated to person, place, time, grossly no focal deficitis    Laboratory Data :      WBC WBC   Date Value Ref Range Status   01/03/2018 10.06 4.50 - 12.50 10*3/mm3 Final   01/02/2018 7.09 4.50 - 12.50 10*3/mm3 Final      HGB Hemoglobin   Date Value Ref Range Status   01/03/2018 9.7 (L) 12.0 - 16.0 g/dL Final   01/02/2018 10.2 (L) 12.0 - 16.0 g/dL Final      HCT Hematocrit   Date Value Ref Range Status   01/03/2018 31.0 (L) 37.0 - 47.0 % Final   01/02/2018 32.5 (L) 37.0 - 47.0 % Final      Platlets No results found for: LABPLAT   MCV MCV   Date Value Ref Range Status   01/03/2018 82.2 80.0 - 94.0 fL Final   01/02/2018 83.5 80.0 - 94.0 fL Final          Sodium Sodium   Date Value Ref Range Status   01/04/2018 137 135 - 153 mmol/L Final   01/03/2018 139 135 - 153 mmol/L Final   01/02/2018 136 135 - 153 mmol/L Final   01/02/2018 136 135 - 153 mmol/L Final      Potassium Potassium   Date Value Ref Range Status   01/04/2018 3.6 3.5 - 5.3 mmol/L Final     Comment:     1+ Hemolysis    01/03/2018 3.7 3.5 - 5.3 mmol/L Final   01/02/2018 3.8 3.5 - 5.3 mmol/L Final   01/02/2018 3.8 3.5 - 5.3 mmol/L  Final      Chloride Chloride   Date Value Ref Range Status   01/04/2018 96 (L) 99 - 112 mmol/L Final   01/03/2018 99 99 - 112 mmol/L Final   01/02/2018 95 (L) 99 - 112 mmol/L Final   01/02/2018 95 (L) 99 - 112 mmol/L Final      CO2 CO2   Date Value Ref Range Status   01/04/2018 30.7 24.3 - 31.9 mmol/L Final   01/03/2018 28.2 24.3 - 31.9 mmol/L Final   01/02/2018 30.0 24.3 - 31.9 mmol/L Final   01/02/2018 30.0 24.3 - 31.9 mmol/L Final      BUN BUN   Date Value Ref Range Status   01/04/2018 48 (H) 7 - 21 mg/dL Final   01/03/2018 43 (H) 7 - 21 mg/dL Final   01/02/2018 43 (H) 7 - 21 mg/dL Final   01/02/2018 43 (H) 7 - 21 mg/dL Final      Creatinine Creatinine   Date Value Ref Range Status   01/04/2018 2.20 (H) 0.43 - 1.29 mg/dL Final   01/03/2018 2.13 (H) 0.43 - 1.29 mg/dL Final   01/02/2018 2.29 (H) 0.43 - 1.29 mg/dL Final   01/02/2018 2.29 (H) 0.43 - 1.29 mg/dL Final      Calcium Calcium   Date Value Ref Range Status   01/04/2018 8.4 7.7 - 10.0 mg/dL Final   01/03/2018 8.5 7.7 - 10.0 mg/dL Final   01/02/2018 8.8 7.7 - 10.0 mg/dL Final   01/02/2018 8.8 7.7 - 10.0 mg/dL Final      PO4 No results found for: CAPO4   Albumin Albumin   Date Value Ref Range Status   01/02/2018 3.70 3.40 - 4.80 g/dL Final      Magnesium Magnesium   Date Value Ref Range Status   01/02/2018 2.0 1.7 - 2.6 mg/dL Final      Uric Acid No results found for: URICACID     Radiology results :     Imaging Results (last 72 hours)     ** No results found for the last 72 hours. **        Imaging Results (last 24 hours)     ** No results found for the last 24 hours. **            Medications:        aspirin 81 mg Oral Daily   atorvastatin 40 mg Oral Nightly   citalopram 20 mg Oral Daily   furosemide 40 mg Oral BID   gabapentin 400 mg Oral Nightly   hydrALAZINE 25 mg Oral Q12H   ipratropium-albuterol 3 mL Nebulization 4x Daily - RT   isosorbide mononitrate 60 mg Oral Daily   metoprolol succinate  mg Oral Q24H   pantoprazole 40 mg Oral Daily    piperacillin-tazobactam 2.25 g Intravenous Q6H   spironolactone 25 mg Oral Daily   vitamin d 5,000 Units Oral Daily   warfarin 6 mg Oral Daily          Assessment/Plan     Active Problems:    CHF (congestive heart failure)      1. DIANA on CKD 4 : her baseline creatinine has been around 2. 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  She has no proteinuria    2. systolic CHF :  Compensated, ct lasix 40 mg po bid    3. HTN : keep -150/80s for her age    4. Moderate pulmonary HTN    5. HCAP  : started zosyn.  blood cultures pending and CRP elevated. Primary team to decide on further duration of antibiotics    6. Urinary retention :  UA negative. Start bladder training and peterson swan    I discussed the patients findings and my recommendations with patient and nursing staff    Sebas Ellis MD  01/04/18  10:48 AM

## 2018-01-04 NOTE — PLAN OF CARE
Problem: Cardiac Output, Decreased (Adult)  Goal: Identify Related Risk Factors and Signs and Symptoms  Outcome: Ongoing (interventions implemented as appropriate)    Goal: Adequate Cardiac Output/Effective Tissue Perfusion  Outcome: Ongoing (interventions implemented as appropriate)      Problem: Fall Risk (Adult)  Goal: Identify Related Risk Factors and Signs and Symptoms  Outcome: Ongoing (interventions implemented as appropriate)    Goal: Absence of Falls  Outcome: Ongoing (interventions implemented as appropriate)      Problem: Patient Care Overview (Adult)  Goal: Plan of Care Review  Outcome: Ongoing (interventions implemented as appropriate)    Goal: Discharge Needs Assessment  Outcome: Ongoing (interventions implemented as appropriate)      Problem: Pressure Ulcer Risk (Diogo Scale) (Adult,Obstetrics,Pediatric)  Goal: Identify Related Risk Factors and Signs and Symptoms  Outcome: Ongoing (interventions implemented as appropriate)    Goal: Skin Integrity  Outcome: Ongoing (interventions implemented as appropriate)

## 2018-01-04 NOTE — PLAN OF CARE
Problem: Cardiac Output, Decreased (Adult)  Goal: Identify Related Risk Factors and Signs and Symptoms  Outcome: Ongoing (interventions implemented as appropriate)   12/26/17 1648   Cardiac Output, Decreased   Cardiac Output, Decreased: Related Risk Factors disease process;heart rhythm altered;oxygenation decreased   Signs and Symptoms (Cardiac Output Decreased) heart rate/rhythm alteration;weakness/activity intolerance;shortness of breath     Goal: Adequate Cardiac Output/Effective Tissue Perfusion  Outcome: Ongoing (interventions implemented as appropriate)   01/03/18 2111   Cardiac Output, Decreased (Adult)   Adequate Cardiac Output/Effective Tissue Perfusion making progress toward outcome       Problem: Cardiac: Heart Failure (Adult)  Goal: Signs and Symptoms of Listed Potential Problems Will be Absent or Manageable (Cardiac: Heart Failure)  Outcome: Ongoing (interventions implemented as appropriate)   12/31/17 0902 01/03/18 0401   Cardiac: Heart Failure   Problems Assessed (Heart Failure) --  all   Problems Present (Heart Failure) cardiac pump dysfunction;functional decline/self-care deficit;decreased quality of life --        Problem: Fall Risk (Adult)  Goal: Identify Related Risk Factors and Signs and Symptoms  Outcome: Ongoing (interventions implemented as appropriate)   12/26/17 1648   Fall Risk   Fall Risk: Related Risk Factors age-related changes;confusion/agitation;depression/anxiety;fatigue/slow reaction;environment unfamiliar;slipper/uneven surfaces   Fall Risk: Signs and Symptoms presence of risk factors     Goal: Absence of Falls  Outcome: Ongoing (interventions implemented as appropriate)   01/03/18 2111   Fall Risk (Adult)   Absence of Falls making progress toward outcome       Problem: Pressure Ulcer Risk (Diogo Scale) (Adult,Obstetrics,Pediatric)  Goal: Identify Related Risk Factors and Signs and Symptoms  Outcome: Ongoing (interventions implemented as appropriate)   01/03/18 1100   Pressure  Ulcer Risk (Diogo Scale)   Related Risk Factors (Pressure Ulcer Risk (Diogo Scale)) age extremes     Goal: Skin Integrity  Outcome: Ongoing (interventions implemented as appropriate)   01/03/18 2111   Pressure Ulcer Risk (Diogo Scale) (Adult,Obstetrics,Pediatric)   Skin Integrity making progress toward outcome      01/03/18 2111   Pressure Ulcer Risk (Diogo Scale) (Adult,Obstetrics,Pediatric)   Skin Integrity making progress toward outcome

## 2018-01-04 NOTE — THERAPY TREATMENT NOTE
Acute Care - Physical Therapy Treatment Note   [facility]     Patient Name: Tre Kinney  : 1932  MRN: [id number]  Today's Date: 2018  Onset of Illness/Injury or Date of Surgery Date: 18  Date of Referral to PT: 18  Referring Physician: Dr. Barone    Admit Date: 2017    Visit Dx:  No diagnosis found.  Patient Active Problem List   Diagnosis   • Acid reflux   • Ankle arthralgia   • Cardiac conduction disorder   • Closed fracture of distal fibula   • Arteriosclerosis of coronary artery   • Fracture of distal end of tibia   • Elevated cholesterol   • BP (high blood pressure)   • Hypertrophic polyarthritis   • OP (osteoporosis)   • Right heart failure   • Heart failure, chronic, right-sided   • SOB (shortness of breath)   • Palpitations   • Acute CHF   • CHF (congestive heart failure)   • Congestive heart failure   • Precordial pain               Adult Rehabilitation Note       18 1111 18 1558       Rehab Assessment/Intervention    Discipline physical therapy assistant  -RF physical therapy assistant  -RF     Document Type therapy note (daily note)  -RF therapy note (daily note)  -RF     Subjective Information agree to therapy  -RF agree to therapy  -RF     Patient Effort, Rehab Treatment good  -RF good  -RF     Symptoms Noted During/After Treatment fatigue  -RF fatigue;shortness of breath  -RF     Precautions/Limitations fall precautions;oxygen therapy device and L/min  -RF fall precautions;oxygen therapy device and L/min  -RF     Patient Response to Treatment Patient tolerant to treatment session with good activity tolerance noted. Pt demonstrates improving functional mobility with transferring, but continues to require assistance and cuing due to LE weakness.  -RF Patient demonstrated improved tolerance to treatmwnt with improved funtional mobility noted. Pt demonstrated some SOB, but was able to recover with PLB.   -RF     Recorded by [RF] Kelli Cain PTA [RF] Kelli STEVENS  NACHO Cain     Pain Assessment    Pain Assessment No/denies pain  -RF No/denies pain  -RF     Recorded by [RF] Kelli Cain PTA [RF] Kelli Cain PTA     Cognitive Assessment/Intervention    Current Cognitive/Communication Assessment functional  -RF functional  -RF     Orientation Status oriented to;person;place;situation  -RF oriented to;person;place;situation  -RF     Follows Commands/Answers Questions able to follow single-step instructions;100% of the time;needs cueing  -RF able to follow single-step instructions;100% of the time;needs cueing;needs repetition  -RF     Personal Safety mild impairment  -RF mild impairment  -RF     Personal Safety Interventions fall prevention program maintained;muscle strengthening facilitated;gait belt;nonskid shoes/slippers when out of bed  -RF fall prevention program maintained;muscle strengthening facilitated;gait belt;nonskid shoes/slippers when out of bed  -RF     Recorded by [RF] Kelli Cain PTA [RF] Kelli Cain PTA     Bed Mobility, Assessment/Treatment    Bed Mobility, Assistive Device bed rails  -RF bed rails  -RF     Bed Mob, Supine to Sit, [place] moderate assist (50% patient effort);2 person assist required  -RF moderate assist (50% patient effort);maximum assist (25% patient effort);2 person assist required  -RF     Bed Mob, Sit to Supine, [place] moderate assist (50% patient effort);2 person assist required  -RF moderate assist (50% patient effort);maximum assist (25% patient effort);2 person assist required  -RF     Bed Mobility, Impairments strength decreased;impaired balance  -RF strength decreased;impaired balance  -RF     Recorded by [RF] Kelli Cain, NACHO [RF] Kelli Cain PTA     Transfer Assessment/Treatment    Transfers, Bed-Chair [place] moderate assist (50% patient effort);verbal cues required;nonverbal cues required (demo/gesture)  -RF maximum assist (25% patient effort);moderate assist (50% patient  effort);verbal cues required;nonverbal cues required (demo/gesture)  -RF     Transfers, Sit-Stand [place] 2 person assist required;moderate assist (50% patient effort);nonverbal cues required (demo/gesture);verbal cues required  -RF maximum assist (25% patient effort);2 person assist required;moderate assist (50% patient effort);nonverbal cues required (demo/gesture);verbal cues required  -RF     Transfers, Stand-Sit [place] 2 person assist required;moderate assist (50% patient effort);nonverbal cues required (demo/gesture);verbal cues required  -RF maximum assist (25% patient effort);2 person assist required;moderate assist (50% patient effort);nonverbal cues required (demo/gesture);verbal cues required  -RF     Transfers, Sit-Stand-Sit, Assist Device rolling walker  -RF rolling walker  -RF     Transfer, Impairments strength decreased;impaired balance  -RF strength decreased;impaired balance  -RF     Transfer, Comment Requires cues for technique and LE advancement  -RF      Recorded by [RF] Kelli Cain PTA [RF] Kelli Cain PTA     Gait Assessment/Treatment    Gait, [place] Level moderate assist (50% patient effort);maximum assist (25% patient effort);nonverbal cues required (demo/gesture);verbal cues required  -RF moderate assist (50% patient effort);maximum assist (25% patient effort);nonverbal cues required (demo/gesture);verbal cues required  -RF     Gait, Assistive Device rolling walker  -RF rolling walker  -RF     Gait, Distance (Feet) 2  -RF 2  -RF     Gait, Impairments strength decreased;impaired balance  -RF strength decreased;impaired balance  -RF     Gait, Comment Short distance from bed to chair.   -RF Pt able to transfer from bed to chair with few steps. Cues for technique and LE advancement required.   -RF     Recorded by [RF] Kelli Cain PTA [RF] Kelli Cain PTA     Therapy Exercises    Bilateral Lower Extremities AROM:;10 reps;sitting  -RF AROM:;10 reps;sitting   -RF     Recorded by [RF] Kelli Cain PTA [RF] Kelli Cain PTA     Positioning and Restraints    Pre-Treatment Position in bed  -RF in bed  -RF     Post Treatment Position chair  -RF chair  -RF     In Chair sitting;call light within reach;encouraged to call for assist  -RF sitting;call light within reach;encouraged to call for assist  -RF     Recorded by [RF] Kelli Cain PTA [RF] Kelli Cain PTA       User Key  (r) = Recorded By, (t) = Taken By, (c) = Cosigned By    Initials Name Effective Dates    RF Kelli Cain PTA 07/19/17 -                 IP PT Goals       01/02/18 1727          Bed Mobility PT LTG    Bed Mobility PT LTG, Date Established 01/02/18  -BC      Bed Mobility PT LTG, Time to Achieve by discharge  -BC      Bed Mobility PT LTG, Activity Type all bed mobility  -BC      Bed Mobility PT LTG, [place] Level minimum assist (75% patient effort)  -BC      Bed Mobility PT Goal  LTG, Assist Device bed rails  -BC      Transfer Training PT LTG    Transfer Training PT LTG, Date Established 01/02/18  -BC      Transfer Training PT LTG, Time to Achieve by discharge  -BC      Transfer Training PT LTG, Activity Type all transfers  -BC      Transfer Training PT LTG, [place] Level minimum assist (75% patient effort)  -BC      Transfer Training PT LTG, Assist Device walker, rolling  -BC      Gait Training PT LTG    Gait Training Goal PT LTG, Date Established 01/02/18  -BC      Gait Training Goal PT LTG, Time to Achieve by discharge  -BC      Gait Training Goal PT LTG, [place] Level minimum assist (75% patient effort);moderate assist (50% patient effort)  -BC      Gait Training Goal PT LTG, Assist Device walker, rolling  -BC      Gait Training Goal PT LTG, Distance to Achieve 20  -BC        User Key  (r) = Recorded By, (t) = Taken By, (c) = Cosigned By    Initials Name Provider Type    WHITNEY Pike PT Physical Therapist          Physical Therapy Education     Title: PT OT  SLP Therapies (Done)     Topic: Physical Therapy (Done)     Point: Mobility training (Done)    Learning Progress Summary    Learner Readiness Method Response Comment Documented by Status   Patient Acceptance E VU,NR   01/04/18 1114 Done    Acceptance E VU,NR   01/03/18 1602 Done    Acceptance E VU  BB 01/03/18 1059 Done    Acceptance E NR  BC 01/02/18 1726 Active               Point: Home exercise program (Done)    Learning Progress Summary    Learner Readiness Method Response Comment Documented by Status   Patient Acceptance E VU,NR   01/04/18 1114 Done    Acceptance E VU,NR   01/03/18 1602 Done    Acceptance E VU  BB 01/03/18 1059 Done    Acceptance E NR  BC 01/02/18 1726 Active               Point: Body mechanics (Done)    Learning Progress Summary    Learner Readiness Method Response Comment Documented by Status   Patient Acceptance E VU,NR   01/04/18 1114 Done    Acceptance E VU,NR   01/03/18 1602 Done    Acceptance E VU   01/03/18 1059 Done    Acceptance E NR  BC 01/02/18 1726 Active               Point: Precautions (Done)    Learning Progress Summary    Learner Readiness Method Response Comment Documented by Status   Patient Acceptance E VU,NR   01/04/18 1114 Done    Acceptance E VU,NR   01/03/18 1602 Done    Acceptance E VU   01/03/18 1059 Done    Acceptance E NR  BC 01/02/18 1726 Active                      User Key     Initials Effective Dates Name Provider Type Discipline    BC 03/14/16 -  Vandana Pike, PT Physical Therapist PT     01/21/17 -  Valdez Motta, RN Registered Nurse Nurse     07/19/17 -  Kelli Cain PTA Physical Therapy Assistant PT                    PT Recommendation and Plan  Anticipated Equipment Needs At Discharge: front wheeled walker, bedside commode  Anticipated Discharge Disposition: home with home health, home with 24/7 care, inpatient rehabilitation facility, skilled nursing facility  Planned Therapy Interventions: bed mobility training, balance  training, gait training, home exercise program, patient/family education, strengthening, transfer training  PT Frequency: 3-5 times/wk, per priority policy             Outcome Measures       01/04/18 1100 01/03/18 1600 01/02/18 1700    How much help from another person do you currently need...    Turning from your back to your side while in flat bed without using bedrails? 2  -RF 2  -RF 2  -BC    Moving from lying on back to sitting on the side of a flat bed without bedrails? 2  -RF 2  -RF 2  -BC    Moving to and from a bed to a chair (including a wheelchair)? 1  -RF 1  -RF 1  -BC    Standing up from a chair using your arms (e.g., wheelchair, bedside chair)? 1  -RF 1  -RF 1  -BC    Climbing 3-5 steps with a railing? 1  -RF 1  -RF 1  -BC    To walk in hospital room? 1  -RF 1  -RF 1  -BC    AM-PAC 6 Clicks Score 8  -RF 8  -RF 8  -BC    Functional Assessment    Outcome Measure Options AM-PAC 6 Clicks Basic Mobility (PT)  -RF AM-PAC 6 Clicks Basic Mobility (PT)  -RF AM-PAC 6 Clicks Basic Mobility (PT)  -BC      User Key  (r) = Recorded By, (t) = Taken By, (c) = Cosigned By    Initials Name Provider Type    BC Vandana Pike, PT Physical Therapist    RF Kelli Cain PTA Physical Therapy Assistant           Time Calculation:         PT Charges       01/04/18 1114          Time Calculation    PT Received On 01/04/18  -RF      PT - Next Appointment 01/05/18  -RF      PT Goal Re-Cert Due Date 01/16/18  -RF      Time Calculation- PT    Total Timed Code Minutes- PT 24 minute(s)  -RF        User Key  (r) = Recorded By, (t) = Taken By, (c) = Cosigned By    Initials Name Provider Type    RF Kelli Cain PTA Physical Therapy Assistant          Therapy Charges for Today     Code Description Service Date Service Provider Modifiers Qty    05794131190 HC PT THER PROC EA 15 MIN 1/3/2018 Kelli Cain, NACHO GP 1    36821699716 HC PT THERAPEUTIC ACT EA 15 MIN 1/3/2018 Kelli Cain PTA GP 1    11834398237 HC PT THER  SUPP EA 15 MIN 1/3/2018 Kelli Cain, PTA GP 2    94608758139 HC PT THER PROC EA 15 MIN 1/4/2018 Kelli Cain, PTA GP 1    42604857772 HC PT THERAPEUTIC ACT EA 15 MIN 1/4/2018 Kelli Cain, PTA GP 1    63318118142 HC PT THER SUPP EA 15 MIN 1/4/2018 Kelli Cain, PTA GP 2          PT G-Codes  Outcome Measure Options: AM-PAC 6 Clicks Basic Mobility (PT)  Score: 8  Functional Limitation: Mobility: Walking and moving around  Mobility: Walking and Moving Around Current Status (): At least 80 percent but less than 100 percent impaired, limited or restricted  Mobility: Walking and Moving Around Goal Status (): At least 60 percent but less than 80 percent impaired, limited or restricted    Kelli Cain, NACHO  1/4/2018

## 2018-01-05 ENCOUNTER — EPISODE CHANGES (OUTPATIENT)
Dept: CASE MANAGEMENT | Facility: OTHER | Age: 83
End: 2018-01-05

## 2018-01-05 ENCOUNTER — HOSPITAL ENCOUNTER (INPATIENT)
Facility: HOSPITAL | Age: 83
LOS: 7 days | Discharge: HOME HEALTH | End: 2018-01-12
Attending: FAMILY MEDICINE | Admitting: FAMILY MEDICINE

## 2018-01-05 VITALS
SYSTOLIC BLOOD PRESSURE: 143 MMHG | DIASTOLIC BLOOD PRESSURE: 70 MMHG | OXYGEN SATURATION: 96 % | TEMPERATURE: 98.8 F | RESPIRATION RATE: 18 BRPM | WEIGHT: 192.2 LBS | HEART RATE: 84 BPM | BODY MASS INDEX: 30.17 KG/M2 | HEIGHT: 67 IN

## 2018-01-05 DIAGNOSIS — I50.23 ACUTE ON CHRONIC SYSTOLIC CONGESTIVE HEART FAILURE (HCC): Primary | ICD-10-CM

## 2018-01-05 DIAGNOSIS — R06.02 SOB (SHORTNESS OF BREATH): ICD-10-CM

## 2018-01-05 LAB
ALBUMIN SERPL-MCNC: 3.3 G/DL (ref 3.4–4.8)
ALBUMIN/GLOB SERPL: 1.6 G/DL (ref 1.5–2.5)
ALP SERPL-CCNC: 42 U/L (ref 35–104)
ALT SERPL W P-5'-P-CCNC: 19 U/L (ref 10–36)
ANION GAP SERPL CALCULATED.3IONS-SCNC: 9.2 MMOL/L (ref 3.6–11.2)
AST SERPL-CCNC: 36 U/L (ref 10–30)
BACTERIA SPEC AEROBE CULT: ABNORMAL
BACTERIA SPEC AEROBE CULT: ABNORMAL
BASOPHILS # BLD AUTO: 0.02 10*3/MM3 (ref 0–0.3)
BASOPHILS NFR BLD AUTO: 0.4 % (ref 0–2)
BILIRUB SERPL-MCNC: 0.6 MG/DL (ref 0.2–1.8)
BNP SERPL-MCNC: 1861 PG/ML (ref 0–100)
BUN BLD-MCNC: 52 MG/DL (ref 7–21)
BUN/CREAT SERPL: 22.7 (ref 7–25)
CALCIUM SPEC-SCNC: 8.1 MG/DL (ref 7.7–10)
CHLORIDE SERPL-SCNC: 97 MMOL/L (ref 99–112)
CO2 SERPL-SCNC: 28.8 MMOL/L (ref 24.3–31.9)
CREAT BLD-MCNC: 2.29 MG/DL (ref 0.43–1.29)
CRP SERPL-MCNC: 10.65 MG/DL (ref 0–0.99)
DEPRECATED RDW RBC AUTO: 49.3 FL (ref 37–54)
EOSINOPHIL # BLD AUTO: 0.01 10*3/MM3 (ref 0–0.7)
EOSINOPHIL NFR BLD AUTO: 0.2 % (ref 0–7)
ERYTHROCYTE [DISTWIDTH] IN BLOOD BY AUTOMATED COUNT: 16.7 % (ref 11.5–14.5)
GFR SERPL CREATININE-BSD FRML MDRD: 20 ML/MIN/1.73
GLOBULIN UR ELPH-MCNC: 2.1 GM/DL
GLUCOSE BLD-MCNC: 88 MG/DL (ref 70–110)
HCT VFR BLD AUTO: 28.9 % (ref 37–47)
HGB BLD-MCNC: 8.9 G/DL (ref 12–16)
IMM GRANULOCYTES # BLD: 0.01 10*3/MM3 (ref 0–0.03)
IMM GRANULOCYTES NFR BLD: 0.2 % (ref 0–0.5)
INR PPP: 6.16 (ref 0.9–1.1)
LYMPHOCYTES # BLD AUTO: 0.52 10*3/MM3 (ref 1–3)
LYMPHOCYTES NFR BLD AUTO: 10.6 % (ref 16–46)
MCH RBC QN AUTO: 25.8 PG (ref 27–33)
MCHC RBC AUTO-ENTMCNC: 30.8 G/DL (ref 33–37)
MCV RBC AUTO: 83.8 FL (ref 80–94)
MONOCYTES # BLD AUTO: 0.31 10*3/MM3 (ref 0.1–0.9)
MONOCYTES NFR BLD AUTO: 6.3 % (ref 0–12)
NEUTROPHILS # BLD AUTO: 4.02 10*3/MM3 (ref 1.4–6.5)
NEUTROPHILS NFR BLD AUTO: 82.3 % (ref 40–75)
OSMOLALITY SERPL CALC.SUM OF ELEC: 283.6 MOSM/KG (ref 273–305)
PLATELET # BLD AUTO: 119 10*3/MM3 (ref 130–400)
PMV BLD AUTO: 11.9 FL (ref 6–10)
POTASSIUM BLD-SCNC: 3.7 MMOL/L (ref 3.5–5.3)
PROT SERPL-MCNC: 5.4 G/DL (ref 6–8)
PROTHROMBIN TIME: 55.7 SECONDS (ref 11–15.4)
RBC # BLD AUTO: 3.45 10*6/MM3 (ref 4.2–5.4)
SODIUM BLD-SCNC: 135 MMOL/L (ref 135–153)
STREP GROUPING: ABNORMAL
WBC NRBC COR # BLD: 4.89 10*3/MM3 (ref 4.5–12.5)

## 2018-01-05 PROCEDURE — 97163 PT EVAL HIGH COMPLEX 45 MIN: CPT

## 2018-01-05 PROCEDURE — 94799 UNLISTED PULMONARY SVC/PX: CPT

## 2018-01-05 PROCEDURE — G8978 MOBILITY CURRENT STATUS: HCPCS

## 2018-01-05 PROCEDURE — 99233 SBSQ HOSP IP/OBS HIGH 50: CPT | Performed by: INTERNAL MEDICINE

## 2018-01-05 PROCEDURE — 80053 COMPREHEN METABOLIC PANEL: CPT | Performed by: NURSE PRACTITIONER

## 2018-01-05 PROCEDURE — 83880 ASSAY OF NATRIURETIC PEPTIDE: CPT | Performed by: NURSE PRACTITIONER

## 2018-01-05 PROCEDURE — G8979 MOBILITY GOAL STATUS: HCPCS

## 2018-01-05 PROCEDURE — G8988 SELF CARE GOAL STATUS: HCPCS

## 2018-01-05 PROCEDURE — 86140 C-REACTIVE PROTEIN: CPT | Performed by: NURSE PRACTITIONER

## 2018-01-05 PROCEDURE — 25010000002 PIPERACILLIN-TAZOBACTAM: Performed by: INTERNAL MEDICINE

## 2018-01-05 PROCEDURE — 97167 OT EVAL HIGH COMPLEX 60 MIN: CPT

## 2018-01-05 PROCEDURE — G8987 SELF CARE CURRENT STATUS: HCPCS

## 2018-01-05 PROCEDURE — 97530 THERAPEUTIC ACTIVITIES: CPT

## 2018-01-05 PROCEDURE — 94660 CPAP INITIATION&MGMT: CPT

## 2018-01-05 PROCEDURE — 85610 PROTHROMBIN TIME: CPT | Performed by: FAMILY MEDICINE

## 2018-01-05 PROCEDURE — 85025 COMPLETE CBC W/AUTO DIFF WBC: CPT | Performed by: NURSE PRACTITIONER

## 2018-01-05 RX ORDER — CITALOPRAM 20 MG/1
20 TABLET ORAL DAILY
Status: CANCELLED | OUTPATIENT
Start: 2018-01-06

## 2018-01-05 RX ORDER — PANTOPRAZOLE SODIUM 40 MG/1
40 TABLET, DELAYED RELEASE ORAL DAILY
Status: DISCONTINUED | OUTPATIENT
Start: 2018-01-06 | End: 2018-01-12 | Stop reason: HOSPADM

## 2018-01-05 RX ORDER — HYDROCODONE BITARTRATE AND ACETAMINOPHEN 7.5; 325 MG/1; MG/1
1 TABLET ORAL 2 TIMES DAILY PRN
Status: DISCONTINUED | OUTPATIENT
Start: 2018-01-05 | End: 2018-01-12 | Stop reason: HOSPADM

## 2018-01-05 RX ORDER — ALPRAZOLAM 0.5 MG/1
0.5 TABLET ORAL NIGHTLY PRN
Status: DISCONTINUED | OUTPATIENT
Start: 2018-01-05 | End: 2018-01-12 | Stop reason: HOSPADM

## 2018-01-05 RX ORDER — HYDROCODONE BITARTRATE AND ACETAMINOPHEN 7.5; 325 MG/1; MG/1
1 TABLET ORAL 2 TIMES DAILY PRN
Status: CANCELLED | OUTPATIENT
Start: 2018-01-05

## 2018-01-05 RX ORDER — HYDRALAZINE HYDROCHLORIDE 25 MG/1
25 TABLET, FILM COATED ORAL EVERY 12 HOURS SCHEDULED
Status: CANCELLED | OUTPATIENT
Start: 2018-01-05

## 2018-01-05 RX ORDER — AMOXICILLIN AND CLAVULANATE POTASSIUM 500; 125 MG/1; MG/1
1 TABLET, FILM COATED ORAL EVERY 12 HOURS SCHEDULED
Status: DISCONTINUED | OUTPATIENT
Start: 2018-01-05 | End: 2018-01-05 | Stop reason: HOSPADM

## 2018-01-05 RX ORDER — AMOXICILLIN AND CLAVULANATE POTASSIUM 500; 125 MG/1; MG/1
1 TABLET, FILM COATED ORAL EVERY 12 HOURS SCHEDULED
Status: COMPLETED | OUTPATIENT
Start: 2018-01-05 | End: 2018-01-10

## 2018-01-05 RX ORDER — ATORVASTATIN CALCIUM 40 MG/1
40 TABLET, FILM COATED ORAL NIGHTLY
Status: DISCONTINUED | OUTPATIENT
Start: 2018-01-05 | End: 2018-01-12 | Stop reason: HOSPADM

## 2018-01-05 RX ORDER — CASTOR OIL AND BALSAM, PERU 788; 87 MG/G; MG/G
OINTMENT TOPICAL EVERY 12 HOURS SCHEDULED
Status: DISCONTINUED | OUTPATIENT
Start: 2018-01-05 | End: 2018-01-12 | Stop reason: HOSPADM

## 2018-01-05 RX ORDER — METOPROLOL SUCCINATE 50 MG/1
100 TABLET, EXTENDED RELEASE ORAL
Status: DISCONTINUED | OUTPATIENT
Start: 2018-01-06 | End: 2018-01-12 | Stop reason: HOSPADM

## 2018-01-05 RX ORDER — GABAPENTIN 400 MG/1
400 CAPSULE ORAL NIGHTLY
Status: DISCONTINUED | OUTPATIENT
Start: 2018-01-05 | End: 2018-01-12 | Stop reason: HOSPADM

## 2018-01-05 RX ORDER — FUROSEMIDE 40 MG/1
40 TABLET ORAL DAILY
Status: CANCELLED | OUTPATIENT
Start: 2018-01-06

## 2018-01-05 RX ORDER — ASPIRIN 81 MG/1
81 TABLET ORAL DAILY
Status: CANCELLED | OUTPATIENT
Start: 2018-01-06

## 2018-01-05 RX ORDER — ATORVASTATIN CALCIUM 40 MG/1
40 TABLET, FILM COATED ORAL NIGHTLY
Status: CANCELLED | OUTPATIENT
Start: 2018-01-05

## 2018-01-05 RX ORDER — ASPIRIN 81 MG/1
81 TABLET ORAL DAILY
Status: DISCONTINUED | OUTPATIENT
Start: 2018-01-06 | End: 2018-01-12 | Stop reason: HOSPADM

## 2018-01-05 RX ORDER — SPIRONOLACTONE 25 MG/1
25 TABLET ORAL DAILY
Status: DISCONTINUED | OUTPATIENT
Start: 2018-01-06 | End: 2018-01-12 | Stop reason: HOSPADM

## 2018-01-05 RX ORDER — FUROSEMIDE 40 MG/1
40 TABLET ORAL DAILY
Status: DISCONTINUED | OUTPATIENT
Start: 2018-01-06 | End: 2018-01-12 | Stop reason: HOSPADM

## 2018-01-05 RX ORDER — ISOSORBIDE MONONITRATE 60 MG/1
60 TABLET, EXTENDED RELEASE ORAL DAILY
Status: CANCELLED | OUTPATIENT
Start: 2018-01-06

## 2018-01-05 RX ORDER — SPIRONOLACTONE 25 MG/1
25 TABLET ORAL DAILY
Status: CANCELLED | OUTPATIENT
Start: 2018-01-06

## 2018-01-05 RX ORDER — IPRATROPIUM BROMIDE AND ALBUTEROL SULFATE 2.5; .5 MG/3ML; MG/3ML
3 SOLUTION RESPIRATORY (INHALATION)
Status: CANCELLED | OUTPATIENT
Start: 2018-01-05

## 2018-01-05 RX ORDER — AMOXICILLIN AND CLAVULANATE POTASSIUM 500; 125 MG/1; MG/1
1 TABLET, FILM COATED ORAL EVERY 12 HOURS SCHEDULED
Status: CANCELLED | OUTPATIENT
Start: 2018-01-05 | End: 2018-01-10

## 2018-01-05 RX ORDER — CITALOPRAM 20 MG/1
20 TABLET ORAL DAILY
Status: DISCONTINUED | OUTPATIENT
Start: 2018-01-06 | End: 2018-01-12 | Stop reason: HOSPADM

## 2018-01-05 RX ORDER — IPRATROPIUM BROMIDE AND ALBUTEROL SULFATE 2.5; .5 MG/3ML; MG/3ML
3 SOLUTION RESPIRATORY (INHALATION)
Status: DISCONTINUED | OUTPATIENT
Start: 2018-01-05 | End: 2018-01-12 | Stop reason: HOSPADM

## 2018-01-05 RX ORDER — ISOSORBIDE MONONITRATE 60 MG/1
60 TABLET, EXTENDED RELEASE ORAL DAILY
Status: DISCONTINUED | OUTPATIENT
Start: 2018-01-06 | End: 2018-01-12 | Stop reason: HOSPADM

## 2018-01-05 RX ORDER — FUROSEMIDE 40 MG/1
40 TABLET ORAL DAILY
Status: DISCONTINUED | OUTPATIENT
Start: 2018-01-05 | End: 2018-01-05 | Stop reason: HOSPADM

## 2018-01-05 RX ORDER — METOPROLOL SUCCINATE 50 MG/1
100 TABLET, EXTENDED RELEASE ORAL
Status: CANCELLED | OUTPATIENT
Start: 2018-01-06

## 2018-01-05 RX ORDER — GABAPENTIN 400 MG/1
400 CAPSULE ORAL NIGHTLY
Status: CANCELLED | OUTPATIENT
Start: 2018-01-05

## 2018-01-05 RX ORDER — HYDRALAZINE HYDROCHLORIDE 25 MG/1
25 TABLET, FILM COATED ORAL EVERY 12 HOURS SCHEDULED
Status: DISCONTINUED | OUTPATIENT
Start: 2018-01-05 | End: 2018-01-12 | Stop reason: HOSPADM

## 2018-01-05 RX ORDER — PANTOPRAZOLE SODIUM 40 MG/1
40 TABLET, DELAYED RELEASE ORAL DAILY
Status: CANCELLED | OUTPATIENT
Start: 2018-01-06

## 2018-01-05 RX ORDER — ALPRAZOLAM 0.5 MG/1
0.5 TABLET ORAL NIGHTLY PRN
Status: CANCELLED | OUTPATIENT
Start: 2018-01-05

## 2018-01-05 RX ADMIN — CITALOPRAM HYDROBROMIDE 20 MG: 20 TABLET ORAL at 08:55

## 2018-01-05 RX ADMIN — PANTOPRAZOLE SODIUM 40 MG: 40 TABLET, DELAYED RELEASE ORAL at 08:55

## 2018-01-05 RX ADMIN — CASTOR OIL AND BALSAM, PERU: 788; 87 OINTMENT TOPICAL at 17:06

## 2018-01-05 RX ADMIN — FUROSEMIDE 40 MG: 40 TABLET ORAL at 08:55

## 2018-01-05 RX ADMIN — HYDRALAZINE HYDROCHLORIDE 25 MG: 25 TABLET ORAL at 08:55

## 2018-01-05 RX ADMIN — METOPROLOL SUCCINATE 100 MG: 50 TABLET, FILM COATED, EXTENDED RELEASE ORAL at 08:55

## 2018-01-05 RX ADMIN — HYDRALAZINE HYDROCHLORIDE 25 MG: 25 TABLET ORAL at 20:29

## 2018-01-05 RX ADMIN — PIPERACILLIN AND TAZOBACTAM 2.25 G: 2; .25 INJECTION, POWDER, LYOPHILIZED, FOR SOLUTION INTRAVENOUS; PARENTERAL at 01:00

## 2018-01-05 RX ADMIN — AMOXICILLIN AND CLAVULANATE POTASSIUM 500 MG: 500; 125 TABLET, FILM COATED ORAL at 14:31

## 2018-01-05 RX ADMIN — CHOLECALCIFEROL CAP 125 MCG (5000 UNIT) 5000 UNITS: 125 CAP at 08:55

## 2018-01-05 RX ADMIN — TUBERCULIN PURIFIED PROTEIN DERIVATIVE 5 UNITS: 5 INJECTION, SOLUTION INTRADERMAL at 17:05

## 2018-01-05 RX ADMIN — IPRATROPIUM BROMIDE AND ALBUTEROL SULFATE 3 ML: .5; 3 SOLUTION RESPIRATORY (INHALATION) at 13:18

## 2018-01-05 RX ADMIN — IPRATROPIUM BROMIDE AND ALBUTEROL SULFATE 3 ML: .5; 3 SOLUTION RESPIRATORY (INHALATION) at 00:23

## 2018-01-05 RX ADMIN — GABAPENTIN 400 MG: 400 CAPSULE ORAL at 20:29

## 2018-01-05 RX ADMIN — PIPERACILLIN AND TAZOBACTAM 2.25 G: 2; .25 INJECTION, POWDER, LYOPHILIZED, FOR SOLUTION INTRAVENOUS; PARENTERAL at 05:08

## 2018-01-05 RX ADMIN — ISOSORBIDE MONONITRATE 60 MG: 60 TABLET, EXTENDED RELEASE ORAL at 08:54

## 2018-01-05 RX ADMIN — IPRATROPIUM BROMIDE AND ALBUTEROL SULFATE 3 ML: .5; 3 SOLUTION RESPIRATORY (INHALATION) at 06:21

## 2018-01-05 RX ADMIN — ASPIRIN 81 MG: 81 TABLET ORAL at 08:55

## 2018-01-05 RX ADMIN — IPRATROPIUM BROMIDE AND ALBUTEROL SULFATE 3 ML: .5; 3 SOLUTION RESPIRATORY (INHALATION) at 19:58

## 2018-01-05 RX ADMIN — ATORVASTATIN CALCIUM 40 MG: 40 TABLET, FILM COATED ORAL at 20:29

## 2018-01-05 RX ADMIN — SPIRONOLACTONE 25 MG: 25 TABLET ORAL at 08:55

## 2018-01-05 RX ADMIN — AMOXICILLIN AND CLAVULANATE POTASSIUM 500 MG: 500; 125 TABLET, FILM COATED ORAL at 20:29

## 2018-01-05 NOTE — PLAN OF CARE
Problem: Cardiac Output, Decreased (Adult)  Goal: Identify Related Risk Factors and Signs and Symptoms  Outcome: Ongoing (interventions implemented as appropriate)   12/26/17 1648   Cardiac Output, Decreased   Cardiac Output, Decreased: Related Risk Factors disease process;heart rhythm altered;oxygenation decreased   Signs and Symptoms (Cardiac Output Decreased) heart rate/rhythm alteration;weakness/activity intolerance;shortness of   12/26/17 1648   Cardiac Output, Decreased   Cardiac Output, Decreased: Related Risk Factors disease process;heart rhythm altered;oxygenation decreased   Signs and Symptoms (Cardiac Output Decreased) heart rate/rhythm alteration;weakness/activity intolerance;shortness of breath    breath     Goal: Adequate Cardiac Output/Effective Tissue Perfusion  Outcome: Ongoing (interventions implemented as appropriate)   01/03/18 2111   Cardiac Output, Decreased (Adult)   Adequate Cardiac Output/Effective Tissue Perfusion making progress toward outcome       Problem: Cardiac: Heart Failure (Adult)  Goal: Signs and Symptoms of Listed Potential Problems Will be Absent or Manageable (Cardiac: Heart Failure)  Outcome: Ongoing (interventions implemented as appropriate)   12/31/17 0902 01/03/18 0401   Cardiac: Heart Failure   Problems Assessed (Heart Failure) --  all   Problems Present (Heart Failure) cardiac pump dysfunction;functional decline/self-care deficit;decreased quality of life --        Problem: Fall Risk (Adult)  Goal: Identify Related Risk Factors and Signs and Symptoms  Outcome: Ongoing (interventions implemented as appropriate)   12/26/17 1648   Fall Risk   Fall Risk: Related Risk Factors age-related changes;confusion/agitation;depression/anxiety;fatigue/slow reaction;environment unfamiliar;slipper/uneven surfaces   Fall Risk: Signs and Symptoms presence of risk factors     Goal: Absence of Falls  Outcome: Ongoing (interventions implemented as appropriate)   01/05/18 1440   Fall Risk  (Adult)   Absence of Falls making progress toward outcome       Problem: Pressure Ulcer Risk (Diogo Scale) (Adult,Obstetrics,Pediatric)  Goal: Identify Related Risk Factors and Signs and Symptoms  Outcome: Ongoing (interventions implemented as appropriate)   01/05/18 1440   Pressure Ulcer Risk (Diogo Scale)   Related Risk Factors (Pressure Ulcer Risk (Diogo Scale)) tissue perfusion altered     Goal: Skin Integrity  Outcome: Ongoing (interventions implemented as appropriate)   01/04/18 2110   Pressure Ulcer Risk (Diogo Scale) (Adult,Obstetrics,Pediatric)   Skin Integrity making progress toward outcome

## 2018-01-05 NOTE — PROGRESS NOTES
Tre Kinney 85 y.o.   01/05/18    Subjective: Patient has no appetite denies any chest pain is set up for several hours  Objective: Stable Afebrile Chest Rhonchi Edema  Vital Signs (last 72 hrs)       01/02 0700  -  01/03 0659 01/03 0700  -  01/04 0659 01/04 0700  -  01/05 0659 01/05 0700  -  01/05 0717   Most Recent    Temp (°F) 97.8 -  (!)101.1    97.5 -  100.4    97.5 -  98.8       98.7 (37.1)    Heart Rate 78 -  83    79 -  96    75 -  86       81    Resp 18 -  20    18 -  20    15 -  22       18    /52 -  154/75    116/56 -  152/73    125/60 -  155/91       155/91    SpO2 (%) 92 -  95    90 -  97    94 -  98       96        Lab Results (last 72 hours)     Procedure Component Value Units Date/Time    Blood Gas, Arterial [[id number]]  (Abnormal) Collected:  01/02/18 1028    Specimen:  Arterial Blood Updated:  01/02/18 1040     Site Arterial: right radial     Devang's Test Positive     pH, Arterial 7.470 (H) pH units      pCO2, Arterial 40.4 mm Hg      pO2, Arterial 53.8 (C) mm Hg      HCO3, Arterial 28.7 (H) mmol/L      Base Excess, Arterial 4.7 mmol/L      O2 Saturation, Arterial 88.2 (L) %      Hemoglobin, Blood Gas 10.0 (L) g/dL      Hematocrit, Blood Gas 29.0 (L) %      Oxyhemoglobin 86.8 %      Methemoglobin 0.20 %      Carboxyhemoglobin 1.4 %      A-a Gradiant 120.4 mmHg      Temperature 98.6 C      Barometric Pressure for Blood Gas 739 mmHg      Modality Cannula - Nasal     FIO2 32 %     Magnesium [[id number]]  (Normal) Collected:  01/02/18 1405    Specimen:  Blood Updated:  01/02/18 1518     Magnesium 2.0 mg/dL     Protime-INR [[id number]]  (Abnormal) Collected:  01/03/18 0312    Specimen:  Blood Updated:  01/03/18 0339     Protime 32.3 (H) Seconds       Note new Reference Range        INR 3.08 (H)    Narrative:       Suggested INR therapeutic range for stable oral anticoagulant therapy:    Low Intensity therapy:   1.5-2.0  Moderate Intensity therapy:   2.0-3.0  High Intensity therapy:   2.5-4.0     Basic Metabolic Panel [[id number]]  (Abnormal) Collected:  01/03/18 0312    Specimen:  Blood Updated:  01/03/18 0342     Glucose 102 mg/dL      BUN 43 (H) mg/dL      Creatinine 2.13 (H) mg/dL      Sodium 139 mmol/L      Potassium 3.7 mmol/L      Chloride 99 mmol/L      CO2 28.2 mmol/L      Calcium 8.5 mg/dL      eGFR Non African Amer 22 (L) mL/min/1.73      BUN/Creatinine Ratio 20.2     Anion Gap 11.8 (H) mmol/L     Narrative:       The MDRD GFR formula is only valid for adults with stable renal function between ages 18 and 70.    Osmolality, Calculated [[id number]]  (Normal) Collected:  01/03/18 0312    Specimen:  Blood Updated:  01/03/18 0342     Osmolality Calc 288.6 mOsm/kg     CBC & Differential [[id number]] Collected:  01/03/18 0646    Specimen:  Blood Updated:  01/03/18 0700    Narrative:       The following orders were created for panel order CBC & Differential.  Procedure                               Abnormality         Status                     ---------                               -----------         ------                     CBC Auto Differential[[id number]]        Abnormal            Final result                 Please view results for these tests on the individual orders.    CBC Auto Differential [[id number]]  (Abnormal) Collected:  01/03/18 0646    Specimen:  Blood Updated:  01/03/18 0700     WBC 10.06 10*3/mm3      RBC 3.77 (L) 10*6/mm3      Hemoglobin 9.7 (L) g/dL      Hematocrit 31.0 (L) %      MCV 82.2 fL      MCH 25.7 (L) pg      MCHC 31.3 (L) g/dL      RDW 16.8 (H) %      RDW-SD 49.6 fl      MPV 10.2 (H) fL      Platelets 101 (L) 10*3/mm3      Neutrophil % 71.6 %      Lymphocyte % 13.2 (L) %      Monocyte % 14.5 (H) %      Eosinophil % 0.0 %      Basophil % 0.3 %      Immature Grans % 0.4 %      Neutrophils, Absolute 7.20 (H) 10*3/mm3      Lymphocytes, Absolute 1.33 10*3/mm3      Monocytes, Absolute 1.46 (H) 10*3/mm3      Eosinophils, Absolute 0.00 10*3/mm3      Basophils, Absolute 0.03 10*3/mm3       Immature Grans, Absolute 0.04 (H) 10*3/mm3      nRBC 0.0 /100 WBC     Blood Gas, Arterial [[id number]]  (Abnormal) Collected:  01/03/18 0823    Specimen:  Arterial Blood Updated:  01/03/18 0847     Site Arterial: left brachial     Devang's Test N/A     pH, Arterial 7.472 (H) pH units      pCO2, Arterial 36.0 mm Hg      pO2, Arterial 65.7 (L) mm Hg      HCO3, Arterial 25.7 mmol/L      Base Excess, Arterial 2.2 mmol/L      O2 Saturation, Arterial 92.3 %      Hemoglobin, Blood Gas 11.4 (L) g/dL      Hematocrit, Blood Gas 34.0 (L) %      Oxyhemoglobin 90.7 %      Methemoglobin 0.10 %      Carboxyhemoglobin 1.6 %      A-a Gradiant 138.1 mmHg      Temperature 98.6 C      Barometric Pressure for Blood Gas 729 mmHg      Modality Cannula - Nasal     FIO2 36 %     C-reactive Protein [[id number]]  (Abnormal) Collected:  01/03/18 1204    Specimen:  Blood Updated:  01/03/18 1324     C-Reactive Protein 10.63 (H) mg/dL     CK [[id number]]  (Normal) Collected:  01/03/18 1204    Specimen:  Blood Updated:  01/03/18 1330     Creatine Kinase 42 U/L     CK-MB [[id number]]  (Normal) Collected:  01/03/18 1204    Specimen:  Blood Updated:  01/03/18 1332     CKMB <0.18 ng/mL     CK-MB Index [[id number]] Collected:  01/03/18 1204    Specimen:  Blood Updated:  01/03/18 1332     CK-MB Index -- %       Unable to calculate.       BNP [[id number]]  (Abnormal) Collected:  01/03/18 1204    Specimen:  Blood Updated:  01/03/18 1332     BNP 3797.0 (H) pg/mL     Troponin [[id number]]  (Abnormal) Collected:  01/03/18 1204    Specimen:  Blood Updated:  01/03/18 1332     Troponin I 0.310 (H) ng/mL     Narrative:       Ultra Troponin I Reference Range:         <=0.039 ng/mL: Negative    0.04-0.779 ng/mL: Indeterminate Range. Suspicious of MI.  Clinical correlation required.       >=0.78  ng/mL: Consistent with myocardial injury.  Clinical correlation required.    Basic Metabolic Panel [[id number]]  (Abnormal) Collected:  01/04/18 0037    Specimen:  Blood  Updated:  01/04/18 0133     Glucose 108 mg/dL      BUN 48 (H) mg/dL      Creatinine 2.20 (H) mg/dL      Sodium 137 mmol/L      Potassium 3.6 mmol/L       1+ Hemolysis         Chloride 96 (L) mmol/L      CO2 30.7 mmol/L      Calcium 8.4 mg/dL      eGFR Non African Amer 21 (L) mL/min/1.73      BUN/Creatinine Ratio 21.8     Anion Gap 10.3 mmol/L     Narrative:       The MDRD GFR formula is only valid for adults with stable renal function between ages 18 and 70.    Osmolality, Calculated [[id number]]  (Normal) Collected:  01/04/18 0037    Specimen:  Blood Updated:  01/04/18 0133     Osmolality Calc 287.0 mOsm/kg     Troponin [[id number]]  (Abnormal) Collected:  01/04/18 0037    Specimen:  Blood Updated:  01/04/18 0138     Troponin I 0.292 (H) ng/mL     Narrative:       Ultra Troponin I Reference Range:         <=0.039 ng/mL: Negative    0.04-0.779 ng/mL: Indeterminate Range. Suspicious of MI.  Clinical correlation required.       >=0.78  ng/mL: Consistent with myocardial injury.  Clinical correlation required.    Protime-INR [[id number]]  (Abnormal) Collected:  01/04/18 0037    Specimen:  Blood Updated:  01/04/18 0153     Protime 42.7 (H) Seconds       Note new Reference Range        INR 4.40 (H)    Narrative:       Suggested INR therapeutic range for stable oral anticoagulant therapy:    Low Intensity therapy:   1.5-2.0  Moderate Intensity therapy:   2.0-3.0  High Intensity therapy:   2.5-4.0    C-reactive Protein [[id number]]  (Abnormal) Collected:  01/04/18 0928    Specimen:  Blood Updated:  01/04/18 1005     C-Reactive Protein 13.70 (H) mg/dL     BNP [[id number]]  (Abnormal) Collected:  01/04/18 0928    Specimen:  Blood Updated:  01/04/18 1023     BNP 3094.0 (H) pg/mL     CBC & Differential [[id number]] Collected:  01/04/18 1152    Specimen:  Blood Updated:  01/04/18 1159    Narrative:       The following orders were created for panel order CBC & Differential.  Procedure                               Abnormality          Status                     ---------                               -----------         ------                     CBC Auto Differential[[id number]]        Abnormal            Final result                 Please view results for these tests on the individual orders.    CBC Auto Differential [[id number]]  (Abnormal) Collected:  01/04/18 1152    Specimen:  Blood Updated:  01/04/18 1159     WBC 10.17 10*3/mm3      RBC 3.88 (L) 10*6/mm3      Hemoglobin 10.3 (L) g/dL      Hematocrit 33.5 (L) %      MCV 86.3 fL      MCH 26.5 (L) pg      MCHC 30.7 (L) g/dL      RDW 17.1 (H) %      RDW-SD 53.1 fl      MPV 11.5 (H) fL      Platelets 140 10*3/mm3      Neutrophil % 87.5 (H) %      Lymphocyte % 5.7 (L) %      Monocyte % 6.0 %      Eosinophil % 0.1 %      Basophil % 0.3 %      Immature Grans % 0.4 %      Neutrophils, Absolute 8.90 (H) 10*3/mm3      Lymphocytes, Absolute 0.58 (L) 10*3/mm3      Monocytes, Absolute 0.61 10*3/mm3      Eosinophils, Absolute 0.01 10*3/mm3      Basophils, Absolute 0.03 10*3/mm3      Immature Grans, Absolute 0.04 (H) 10*3/mm3     Urine Culture - Urine, Urine, Clean Catch [[id number]]  (Abnormal) Collected:  01/02/18 0935    Specimen:  Urine from Urine, Clean Catch Updated:  01/04/18 1258     Urine Culture --      90,000-100,000 CFU/mL Streptococcus, Beta Hemolytic, Group B (A)     STREP GROUPING B    Blood Culture - Blood, [[id number]]  (Normal) Collected:  01/03/18 1150    Specimen:  Blood from Hand, Right Updated:  01/04/18 1516     Blood Culture No growth at 24 hours    Blood Culture - Blood, [[id number]]  (Normal) Collected:  01/03/18 1205    Specimen:  Blood from Hand, Left Updated:  01/04/18 1516     Blood Culture No growth at 24 hours    CBC & Differential [[id number]] Collected:  01/05/18 0209    Specimen:  Blood Updated:  01/05/18 0254    Narrative:       The following orders were created for panel order CBC & Differential.  Procedure                               Abnormality         Status                      ---------                               -----------         ------                     CBC Auto Differential[[id number]]        Abnormal            Final result                 Please view results for these tests on the individual orders.    CBC Auto Differential [[id number]]  (Abnormal) Collected:  01/05/18 0209    Specimen:  Blood Updated:  01/05/18 0254     WBC 4.89 10*3/mm3      RBC 3.45 (L) 10*6/mm3      Hemoglobin 8.9 (L) g/dL      Hematocrit 28.9 (L) %      MCV 83.8 fL      MCH 25.8 (L) pg      MCHC 30.8 (L) g/dL      RDW 16.7 (H) %      RDW-SD 49.3 fl      MPV 11.9 (H) fL      Platelets 119 (L) 10*3/mm3      Neutrophil % 82.3 (H) %      Lymphocyte % 10.6 (L) %      Monocyte % 6.3 %      Eosinophil % 0.2 %      Basophil % 0.4 %      Immature Grans % 0.2 %      Neutrophils, Absolute 4.02 10*3/mm3      Lymphocytes, Absolute 0.52 (L) 10*3/mm3      Monocytes, Absolute 0.31 10*3/mm3      Eosinophils, Absolute 0.01 10*3/mm3      Basophils, Absolute 0.02 10*3/mm3      Immature Grans, Absolute 0.01 10*3/mm3     C-reactive Protein [[id number]]  (Abnormal) Collected:  01/05/18 0209    Specimen:  Blood Updated:  01/05/18 0254     C-Reactive Protein 10.65 (H) mg/dL     Comprehensive Metabolic Panel [[id number]]  (Abnormal) Collected:  01/05/18 0209    Specimen:  Blood Updated:  01/05/18 0300     Glucose 88 mg/dL      BUN 52 (H) mg/dL      Creatinine 2.29 (H) mg/dL      Sodium 135 mmol/L      Potassium 3.7 mmol/L      Chloride 97 (L) mmol/L      CO2 28.8 mmol/L      Calcium 8.1 mg/dL      Total Protein 5.4 (L) g/dL      Albumin 3.30 (L) g/dL      ALT (SGPT) 19 U/L      AST (SGOT) 36 (H) U/L      Alkaline Phosphatase 42 U/L       Note New Reference Ranges        Total Bilirubin 0.6 mg/dL      eGFR Non African Amer 20 (L) mL/min/1.73      Globulin 2.1 gm/dL      A/G Ratio 1.6 g/dL      BUN/Creatinine Ratio 22.7     Anion Gap 9.2 mmol/L     Narrative:       The MDRD GFR formula is only valid for adults with stable renal  function between ages 18 and 70.    Osmolality, Calculated [[id number]]  (Normal) Collected:  01/05/18 0209    Specimen:  Blood Updated:  01/05/18 0300     Osmolality Calc 283.6 mOsm/kg     Protime-INR [[id number]]  (Abnormal) Collected:  01/05/18 0209    Specimen:  Blood Updated:  01/05/18 0309     Protime 55.7 (C) Seconds      INR 6.16 (C)    Narrative:       Suggested INR therapeutic range for stable oral anticoagulant therapy:    Low Intensity therapy:   1.5-2.0  Moderate Intensity therapy:   2.0-3.0  High Intensity therapy:   2.5-4.0    BNP [[id number]]  (Abnormal) Collected:  01/05/18 0209    Specimen:  Blood Updated:  01/05/18 0326     BNP 1861.0 (H) pg/mL         Imaging Results (last 24 hours)     ** No results found for the last 24 hours. **        Assessment:Active Problems:    CHF (congestive heart failure)   Mildly elevated troponin level  Left lung consolidation  Hypercoagulation  Plan: Patient evaluated in room for swelling.  She will continue to require IV antibiotics-talia monitoring of her physical therapy

## 2018-01-05 NOTE — PLAN OF CARE
Problem: Patient Care Overview (Adult)  Goal: Plan of Care Review  Outcome: Ongoing (interventions implemented as appropriate)    Goal: Adult Individualization and Mutuality  Outcome: Ongoing (interventions implemented as appropriate)    Goal: Discharge Needs Assessment  Outcome: Ongoing (interventions implemented as appropriate)      Problem: Pressure Ulcer Risk (Diogo Scale) (Adult,Obstetrics,Pediatric)  Goal: Identify Related Risk Factors and Signs and Symptoms  Outcome: Ongoing (interventions implemented as appropriate)    Goal: Skin Integrity  Outcome: Ongoing (interventions implemented as appropriate)

## 2018-01-05 NOTE — NURSING NOTE
Tuberculin solution was administered into the left forearm. Area was marked. Patient tolerated well.

## 2018-01-05 NOTE — THERAPY EVALUATION
Acute Care - Occupational Therapy Initial Evaluation  [facility]     Patient Name: Tre Kinney  : 1932  MRN: [id number]  Today's Date: 2018             Admit Date: 2018     No diagnosis found.  Patient Active Problem List   Diagnosis   • Acid reflux   • Ankle arthralgia   • Cardiac conduction disorder   • Closed fracture of distal fibula   • Arteriosclerosis of coronary artery   • Fracture of distal end of tibia   • Elevated cholesterol   • BP (high blood pressure)   • Hypertrophic polyarthritis   • OP (osteoporosis)   • Right heart failure   • Heart failure, chronic, right-sided   • SOB (shortness of breath)   • Palpitations   • Acute CHF   • CHF (congestive heart failure)   • Congestive heart failure   • Precordial pain     Past Medical History:   Diagnosis Date   • Arthritis    • CHF (congestive heart failure)    • Coronary artery disease    • H/O blood clots    • History of transfusion     no reaction    • Hypertension      Past Surgical History:   Procedure Laterality Date   • ABDOMINAL SURGERY     • APPENDECTOMY     • CARDIAC SURGERY     •  SECTION     • CORONARY ANGIOPLASTY WITH STENT PLACEMENT     • PACEMAKER IMPLANTATION      left side    • SKIN BIOPSY     • TUBAL ABDOMINAL LIGATION            OT ASSESSMENT FLOWSHEET (last 72 hours)      OT Evaluation       18 1459 18 0900 18 2059 18 1111 18 0908    Rehab Evaluation    Document Type evaluation  -KR   therapy note (daily note)  -RF     Subjective Information agree to therapy  -KR   agree to therapy  -RF     Patient Effort, Rehab Treatment good  -KR   good  -RF     Symptoms Noted During/After Treatment    fatigue  -RF     General Information    Precautions/Limitations    fall precautions;oxygen therapy device and L/min  -RF     Living Environment    Lives With child(abner), adult  -KR        Clinical Impression    Criteria for Skilled Therapeutic Interventions Met yes  -KR        Rehab Potential good, to  achieve stated therapy goals  -KR        Therapy Frequency 3-5 times/wk  -KR        Pain Assessment    Pain Assessment    No/denies pain  -RF     Cognitive Assessment/Intervention    Current Cognitive/Communication Assessment functional  -KR   functional  -RF     Orientation Status oriented to;person;place  -KR   oriented to;person;place;situation  -RF     Follows Commands/Answers Questions    able to follow single-step instructions;100% of the time;needs cueing  -RF     Personal Safety    mild impairment  -RF     Personal Safety Interventions    fall prevention program maintained;muscle strengthening facilitated;gait belt;nonskid shoes/slippers when out of bed  -RF     ROM (Range of Motion)    General ROM Detail BUE 4/5  -KR        MMT (Manual Muscle Testing)    General MMT Assessment Detail BUE 3-/5  -KR        Muscle Tone Assessment    Muscle Tone Assessment   Bilateral Lower Extremities  -SM  Bilateral Lower Extremities  -KJ    Bilateral Lower Extremities Muscle Tone Assessment  mildly increased tone  -AC mildly decreased tone  -SM  mildly decreased tone  -KJ    Bed Mobility, Assessment/Treatment    Bed Mobility, Assistive Device    bed rails  -RF     Bed Mob, Supine to Sit, [place]    moderate assist (50% patient effort);2 person assist required  -RF     Bed Mob, Sit to Supine, [place]    moderate assist (50% patient effort);2 person assist required  -RF     Bed Mobility, Impairments    strength decreased;impaired balance  -RF     Transfer Assessment/Treatment    Transfers, Bed-Chair [place]    moderate assist (50% patient effort);verbal cues required;nonverbal cues required (demo/gesture)  -RF     Transfers, Sit-Stand [place]    2 person assist required;moderate assist (50% patient effort);nonverbal cues required (demo/gesture);verbal cues required  -RF     Transfers, Stand-Sit [place]ha    2 person assist required;moderate assist (50% patient effort);nonverbal cues required  (demo/gesture);verbal cues required  -RF     Transfers, Sit-Stand-Sit, Assist Device    rolling walker  -RF     Transfer, Impairments    strength decreased;impaired balance  -RF     Transfer, Comment    Requires cues for technique and LE advancement  -RF     Upper Body Bathing Assessment/Training    UB Bathing Assess/Train, [place] Level moderate assist (50% patient effort)  -KR        Lower Body Bathing Assessment/Training    LB Bathing Assess/Train, [place] Level maximum assist (25% patient effort)  -KR        Upper Body Dressing Assessment/Training    UB Dressing Assess/Train, [place] moderate assist (50% patient effort)  -KR        Lower Body Dressing Assessment/Training    LB Dressing Assess/Train, [place] maximum assist (25% patient effort)  -KR        Toileting Assessment/Training    Toileting Assess/Train, [place] Level maximum assist (25% patient effort)  -KR        Grooming Assessment/Training    Grooming Assess/Train, [place] Level minimum assist (75% patient effort)  -KR        Therapy Exercises    Bilateral Lower Extremities    AROM:;10 reps;sitting  -RF     General Therapy Interventions    Planned Therapy Interventions activity intolerance;ADL retraining;strengthening  -KR        Positioning and Restraints    Pre-Treatment Position    in bed  -RF     Post Treatment Position    chair  -RF     In Chair    sitting;call light within reach;encouraged to call for assist  -RF       01/03/18 2000 01/03/18 1558 01/03/18 0745 01/02/18 1700       Rehab Evaluation    Document Type  therapy note (daily note)  -RF  evaluation  -BC     Subjective Information  agree to therapy  -RF  agree to therapy;complains of;weakness;fatigue  -BC     Patient Effort, Rehab Treatment  good  -RF  fair  -BC     Symptoms Noted During/After Treatment  fatigue;shortness of breath  -RF       General Information    Patient Profile Review    yes  -BC     Onset of Illness/Injury or Date of Surgery Date    01/02/18  -BC      Referring Physician    Dr. Barone  -BC     General Observations    Pt. supine in bed, awake and alert, no visitor in room.  -BC     Precautions/Limitations  fall precautions;oxygen therapy device and L/min  -RF       Prior Level of Function    min assist:;mod assist:;all household mobility  -BC     Plans/Goals Discussed With    patient;agreed upon  -BC     Risks Reviewed    patient:;LOB;nausea/vomiting;dizziness;increased discomfort;change in vital signs;lines disloged  -BC     Benefits Reviewed    patient:;improve function;increase independence;increase strength;increase balance;decrease pain;decrease risk of DVT;increase knowledge  -BC     Living Environment    Lives With    child(abner), adult   she lives with her son  -BC     Pain Assessment    Pain Assessment  No/denies pain  -RF  No/denies pain  -BC     Cognitive Assessment/Intervention    Current Cognitive/Communication Assessment  functional  -RF  functional  -BC     Orientation Status  oriented to;person;place;situation  -RF  oriented to;person;place;situation  -BC     Follows Commands/Answers Questions  able to follow single-step instructions;100% of the time;needs cueing;needs repetition  -RF  100% of the time;able to follow single-step instructions  -BC     Personal Safety  mild impairment  -RF  mild impairment;decreased awareness, need for assist;decreased awareness, need for safety;decreased insight to deficits  -BC     Personal Safety Interventions  fall prevention program maintained;muscle strengthening facilitated;gait belt;nonskid shoes/slippers when out of bed  -RF  fall prevention program maintained;gait belt;muscle strengthening facilitated;nonskid shoes/slippers when out of bed  -BC     ROM (Range of Motion)    General ROM    no range of motion deficits identified  -BC     MMT (Manual Muscle Testing)    General MMT Assessment    lower extremity strength deficits identified  -BC     General MMT Assessment Detail    BLE MMT 3-/5  -BC     Muscle  Tone Assessment    Muscle Tone Assessment Bilateral Lower Extremities  -PM  Bilateral Lower Extremities  -BB Bilateral Lower Extremities  -BC     Bilateral Lower Extremities Muscle Tone Assessment moderately decreased tone  -PM  moderately decreased tone  -BB moderately decreased tone  -BC     Bed Mobility, Assessment/Treatment    Bed Mobility, Assistive Device  bed rails  -RF  bed rails  -BC     Bed Mob, Supine to Sit, [place]  moderate assist (50% patient effort);maximum assist (25% patient effort);2 person assist required  -RF  moderate assist (50% patient effort);maximum assist (25% patient effort);2 person assist required  -BC     Bed Mob, Sit to Supine, [place]  moderate assist (50% patient effort);maximum assist (25% patient effort);2 person assist required  -RF  moderate assist (50% patient effort);maximum assist (25% patient effort);2 person assist required  -BC     Bed Mobility, Impairments  strength decreased;impaired balance  -RF       Transfer Assessment/Treatment    Transfers, Bed-Chair [place]  maximum assist (25% patient effort);moderate assist (50% patient effort);verbal cues required;nonverbal cues required (demo/gesture)  -RF  maximum assist (25% patient effort)  -BC     Transfers, Sit-Stand [place]  maximum assist (25% patient effort);2 person assist required;moderate assist (50% patient effort);nonverbal cues required (demo/gesture);verbal cues required  -RF  maximum assist (25% patient effort);2 person assist required  -BC     Transfers, Stand-Sit [place]  maximum assist (25% patient effort);2 person assist required;moderate assist (50% patient effort);nonverbal cues required (demo/gesture);verbal cues required  -RF  maximum assist (25% patient effort);2 person assist required  -BC     Transfers, Sit-Stand-Sit, Assist Device  rolling walker  -RF  rolling walker  -BC     Transfer, Impairments  strength decreased;impaired balance  -RF       Therapy Exercises    Bilateral  Lower Extremities  AROM:;10 reps;sitting  -RF       Positioning and Restraints    Pre-Treatment Position  in bed  -RF  in bed  -BC     Post Treatment Position  chair  -RF  bed  -BC     In Bed    notified nsg;supine;call light within reach;encouraged to call for assist;side rails up x3  -BC     In Chair  sitting;call light within reach;encouraged to call for assist  -RF         User Key  (r) = Recorded By, (t) = Taken By, (c) = Cosigned By    Initials Name Effective Dates    AC Araseli Egan, RN 06/16/16 -     BC Vandana Pike, PT 03/14/16 -     KR Andres Uriarte, OT 03/14/16 -     KJ Sharmila Ernst, RN 09/27/17 -     PM Jaclyn Mauro, SONAM 06/10/16 -     BB Valdez Motta, RN 01/21/17 -     SM Erin Taveras, RN 04/03/17 -     RF Kelli Cain, PTA 07/19/17 -                  OT Recommendation and Plan  Planned Therapy Interventions: activity intolerance, ADL retraining, strengthening  Therapy Frequency: 3-5 times/wk                 Outcome Measures       01/05/18 1505 01/05/18 1500 01/04/18 1100    How much difficulty does the patient currently have...    Turning from your back to your side while in flat bed without using bedrails?   2  -RF    Standing up from a chair using your arms (e.g., wheelchair, bedside chair)?   1  -RF    Moving from lying on back to sitting on the side of a flat bed without bedrails?   2  -RF    How much help from another person do you currently need...    Moving to and from a bed to a chair (including a wheelchair)?   1  -RF    Climbing 3-5 steps with a railing?   1  -RF    To walk in hospital room?   1  -RF    AM-PAC 6 Clicks Score   8  -RF    How much help from another is currently needed...    Putting on and taking off regular lower body clothing? 2  -KR      Bathing (including washing, rinsing, and drying) 2  -KR      Toileting (which includes using toilet bed pan or urinal) 2  -KR      Putting on and taking off regular upper body clothing 2  -KR       Taking care of personal grooming (such as brushing teeth) 3  -KR      Eating meals 3  -KR      Score 14  -KR      Functional Assessment    Outcome Measure Options  AM-PAC 6 Clicks Daily Activity (OT)  -KR AM-PAC 6 Clicks Basic Mobility (PT)  -RF      01/03/18 1600 01/02/18 1700       How much difficulty does the patient currently have...    Turning from your back to your side while in flat bed without using bedrails? 2  -RF 2  -BC     Standing up from a chair using your arms (e.g., wheelchair, bedside chair)? 1  -RF 1  -BC     Moving from lying on back to sitting on the side of a flat bed without bedrails? 2  -RF 2  -BC     How much help from another person do you currently need...    Moving to and from a bed to a chair (including a wheelchair)? 1  -RF 1  -BC     Climbing 3-5 steps with a railing? 1  -RF 1  -BC     To walk in hospital room? 1  -RF 1  -BC     AM-PAC 6 Clicks Score 8  -RF 8  -BC     Functional Assessment    Outcome Measure Options AM-PAC 6 Clicks Basic Mobility (PT)  -RF AM-PAC 6 Clicks Basic Mobility (PT)  -BC       User Key  (r) = Recorded By, (t) = Taken By, (c) = Cosigned By    Initials Name Provider Type    BC Vandana Pike, PT Physical Therapist    KR Andres Uriarte, OT Occupational Therapist    RF Kelli Cain PTA Physical Therapy Assistant          Time Calculation:        Therapy Charges for Today     Code Description Service Date Service Provider Modifiers Qty    03070407125  OT SELFCARE CURRENT 1/5/2018 VELASQUEZ Pearson, CL 1    [id number] HC OT SELFCARE PROJECTED 1/5/2018 VELASQUEZ Pearson, CJ 1    [id number]  OT EVAL HIGH COMPLEXITY 2 1/5/2018 Andres Uriarte OT GO 1          OT G-codes  Functional Limitation: Self care  Self Care Current Status (): At least 60 percent but less than 80 percent impaired, limited or restricted  Self Care Goal Status (): At least 20 percent but less than 40 percent impaired, limited or restricted    Andres Uriarte OT  1/5/2018

## 2018-01-05 NOTE — PROGRESS NOTES
LOS: 10 days     Chief Complaint:  Pulmonology is following for pleural effusion     Subjective     Interval History:     Mrs. Larson is doing well this morning, breathing is getting better, no distress noted on exam.     History taken from: patient chart RN    Review of Systems:   Review of Systems   Constitutional: Negative for chills, fatigue and fever.   HENT: Negative for congestion and rhinorrhea.    Eyes: Negative for photophobia and visual disturbance.   Respiratory: Negative for cough, shortness of breath and wheezing.    Cardiovascular: Negative for chest pain and leg swelling.   Gastrointestinal: Negative for abdominal distention and abdominal pain.   Endocrine: Negative for cold intolerance and heat intolerance.   Genitourinary: Negative for difficulty urinating.   Musculoskeletal: Negative for arthralgias.   Allergic/Immunologic: Negative for environmental allergies.   Neurological: Negative for dizziness, weakness and light-headedness.   Psychiatric/Behavioral: Negative for agitation, behavioral problems and confusion.                     Objective     Vital Signs  Temp:  [97.5 °F (36.4 °C)-98.8 °F (37.1 °C)] 98.7 °F (37.1 °C)  Heart Rate:  [75-86] 81  Resp:  [15-22] 18  BP: (125-155)/(60-91) 155/91  Body mass index is 30.1 kg/(m^2).    Intake/Output Summary (Last 24 hours) at 01/05/18 0919  Last data filed at 01/05/18 0700   Gross per 24 hour   Intake              720 ml   Output             1700 ml   Net             -980 ml          Physical Exam:  GENERAL APPEARANCE: Well developed, well nourished, alert and cooperative, and appears to be in no acute distress.    HEAD: normocephalic.    EYES: PERRL    NECK: Neck supple.     CARDIAC: Normal S1 and S2. No S3, S4 or murmurs. Rhythm is regular. There is no peripheral edema, cyanosis or pallor. Extremities are warm and well perfused. Capillary refill is less than 2 seconds. No carotid bruits. No jvd pulses equal bilaterally     Respiratory: Clear to  auscultation and percussion without rales, rhonchi, wheezing or diminished breath sounds.    GI: Positive bowel sounds. Soft, nondistended, nontender.     Musculoskeletal: No significant deformity or joint abnormality. No edema. Peripheral pulses intact.     NEUROLOGICAL: Strength and sensation symmetric and intact throughout.     PSYCHIATRIC: The mental examination revealed the patient was oriented to person, place, and time.                 Results Review:                I reviewed the patient's new clinical results.  I reviewed the patient's new imaging results and agree with the interpretation.    Results from last 7 days  Lab Units 01/05/18  0209 01/04/18  1152 01/03/18  0646   WBC 10*3/mm3 4.89 10.17 10.06   HEMOGLOBIN g/dL 8.9* 10.3* 9.7*   PLATELETS 10*3/mm3 119* 140 101*       Results from last 7 days  Lab Units 01/05/18  0209 01/04/18  0037 01/03/18  0312 01/02/18  1405   SODIUM mmol/L 135 137 139  --    POTASSIUM mmol/L 3.7 3.6 3.7  --    CHLORIDE mmol/L 97* 96* 99  --    CO2 mmol/L 28.8 30.7 28.2  --    BUN mg/dL 52* 48* 43*  --    CREATININE mg/dL 2.29* 2.20* 2.13*  --    CALCIUM mg/dL 8.1 8.4 8.5  --    GLUCOSE mg/dL 88 108 102  --    MAGNESIUM mg/dL  --   --   --  2.0     Lab Results   Component Value Date    INR 6.16 (C) 01/05/2018    INR 4.40 (H) 01/04/2018    INR 3.08 (H) 01/03/2018    PROTIME 55.7 (C) 01/05/2018    PROTIME 42.7 (H) 01/04/2018    PROTIME 32.3 (H) 01/03/2018       Results from last 7 days  Lab Units 01/05/18  0209 01/02/18  0536   ALK PHOS U/L 42 53   BILIRUBIN mg/dL 0.6 0.7   ALT (SGPT) U/L 19 14   AST (SGOT) U/L 36* 22       Results from last 7 days  Lab Units 01/03/18  0823   PH, ARTERIAL pH units 7.472*   PO2 ART mm Hg 65.7*   PCO2, ARTERIAL mm Hg 36.0   HCO3 ART mmol/L 25.7     Imaging Results (last 24 hours)     ** No results found for the last 24 hours. **             Medication Review:   Scheduled Medications:    aspirin 81 mg Oral Daily   atorvastatin 40 mg Oral Nightly    citalopram 20 mg Oral Daily   furosemide 40 mg Oral Daily   gabapentin 400 mg Oral Nightly   hydrALAZINE 25 mg Oral Q12H   ipratropium-albuterol 3 mL Nebulization 4x Daily - RT   isosorbide mononitrate 60 mg Oral Daily   metoprolol succinate  mg Oral Q24H   pantoprazole 40 mg Oral Daily   piperacillin-tazobactam 2.25 g Intravenous Q6H   spironolactone 25 mg Oral Daily   vitamin d 5,000 Units Oral Daily     Continuous infusions:    Pharmacy to dose warfarin        Assessment/Plan     Respiratory Failure-hypoxic: likely related to fluid overload from heart failure and renal failure. Continue diuresis as tolerated by renals and BP per nephrology.     Continue scheduled inhalants and nebulizer's. Continue supplemental oxygen to maintain Sp02 >92-94%. Continue BiPap at hs for and during the day for shortness of breath.     Continue PO prednisone, will wean as tolerated. She did use Bipap last night.      Continue aspiration precautions.       Heart failure, positive troponin: HR 81, /91 continue continuous ECG and v/s monitoring, maintain MAP >65. BNP yesterday was 3797, today is 1861 Positive cardiac's yesterday likely related to renal failure and hypoxia, cardiology is on board will await their input.       DIANA on CKD: Creatinine 2.29, 24 hour urine 1.2 liters, continue strict I&O, electrolytes WNL.     Hypercoagulopathy: INR is 6.6 primary/pharmacy managing.     UTI: will dc zosyn and start augmentin BID for 5 days.          Patient Active Problem List   Diagnosis Code   • Acid reflux K21.9   • Ankle arthralgia M25.579   • Cardiac conduction disorder I45.9   • Closed fracture of distal fibula S82.839A   • Arteriosclerosis of coronary artery I25.10   • Fracture of distal end of tibia S82.309A   • Elevated cholesterol E78.00   • BP (high blood pressure) I10   • Hypertrophic polyarthritis M15.9   • OP (osteoporosis) M81.0   • Right heart failure I50.810   • Heart failure, chronic, right-sided I50.812   •  SOB (shortness of breath) R06.02   • Palpitations R00.2   • Acute CHF I50.9   • CHF (congestive heart failure) I50.9   • Congestive heart failure I50.9   • Precordial pain R07.2       LANCE Buckley  01/05/18  9:19 AM      Scribed for Dr. Bridges by LANCE Morrow.     IKian M.D. attest that the above note accurately reflects the work and decisions made  by me.  Patient was seen and evaluated by Dr. Bridges, including history of present illness, physical exam, assessment, and treatment plan.  The above note was reviewed and edited by Dr. Bridges.

## 2018-01-05 NOTE — THERAPY EVALUATION
Acute Care - Physical Therapy Initial Evaluation/Treatment Note  CODY Ellison     Patient Name: Tre Kinney  : 1932  MRN: [id number]  Today's Date: 2018   Onset of Illness/Injury or Date of Surgery Date: 18 (swing bed admit date)  Date of Referral to PT: 18         Admit Date: 2018     Visit Dx:  No diagnosis found.  Patient Active Problem List   Diagnosis   • Acid reflux   • Ankle arthralgia   • Cardiac conduction disorder   • Closed fracture of distal fibula   • Arteriosclerosis of coronary artery   • Fracture of distal end of tibia   • Elevated cholesterol   • BP (high blood pressure)   • Hypertrophic polyarthritis   • OP (osteoporosis)   • Right heart failure   • Heart failure, chronic, right-sided   • SOB (shortness of breath)   • Palpitations   • Acute CHF   • CHF (congestive heart failure)   • Congestive heart failure   • Precordial pain     Past Medical History:   Diagnosis Date   • Arthritis    • CHF (congestive heart failure)    • Coronary artery disease    • H/O blood clots    • History of transfusion     no reaction    • Hypertension      Past Surgical History:   Procedure Laterality Date   • ABDOMINAL SURGERY     • APPENDECTOMY     • CARDIAC SURGERY     •  SECTION     • CORONARY ANGIOPLASTY WITH STENT PLACEMENT     • PACEMAKER IMPLANTATION      left side    • SKIN BIOPSY     • TUBAL ABDOMINAL LIGATION            PT ASSESSMENT (last 72 hours)      PT Evaluation       18 1658 18 1500    Rehab Evaluation    Document Type evaluation;therapy note (daily note)  -CT     Subjective Information agree to therapy;complains of;weakness;fatigue  -CT     Patient Effort, Rehab Treatment poor  -CT     Symptoms Noted During/After Treatment fatigue  -CT     Symptoms Noted Comment Pt admitted to swing bed inpatient status today. Pt needed encouragement to participate with therapy. Pt tolerated fairly well with rest breaks provided as needed. Pt unable to ambulate today and  "only transferd to chair with knees buckling during transfer.   -CT     General Information    Patient Profile Review yes  -CT     Onset of Illness/Injury or Date of Surgery Date 01/05/18   swing bed admit date  -CT     Precautions/Limitations fall precautions;oxygen therapy device and L/min  -CT     Prior Level of Function min assist:;mod assist:;all household mobility  -CT     Equipment Currently Used at Home hospital bed;walker, rolling;commode;oxygen  -CT hospital bed;walker, rolling;commode;oxygen  -KH    Plans/Goals Discussed With patient;agreed upon  -CT     Risks Reviewed patient:;LOB;nausea/vomiting;dizziness;increased discomfort;change in vital signs;increased drainage;lines disloged  -CT     Benefits Reviewed patient:;improve function;increase independence;increase strength;increase balance;decrease pain;decrease risk of DVT;improve skin integrity;increase knowledge  -CT     Barriers to Rehab previous functional deficit;physical barrier  -CT     Living Environment    Lives With child(abner), adult   she lives with her son  -CT child(abner), adult  -KH    Living Arrangements house  -CT house  -KH    Home Accessibility  no concerns  -KH    Stair Railings at Home  none  -KH    Type of Financial/Environmental Concern  none  -KH    Transportation Available  public transportation;car  -    Clinical Impression    Date of Referral to PT 01/05/18  -CT     Patient/Family Goals Statement Pt goals are to return to PLOF and \"go home\"  -CT     Criteria for Skilled Therapeutic Interventions Met yes;treatment indicated  -CT     Pathology/Pathophysiology Noted (Describe Specifically for Each System) musculoskeletal;neuromuscular  -CT     Impairments Found (describe specific impairments) aerobic capacity/endurance;gait, locomotion, and balance  -CT     Functional Limitations in Following Categories (Describe Specific Limitations) self-care;home management  -CT     Rehab Potential fair, will monitor progress closely  -CT     " Predicted Duration of Therapy Intervention (days/wks) length of stay  -CT     Pain Assessment    Pain Assessment No/denies pain  -CT     Cognitive Assessment/Intervention    Current Cognitive/Communication Assessment functional  -CT     Orientation Status oriented to;person;place;situation  -CT     Follows Commands/Answers Questions able to follow single-step instructions;100% of the time  -CT     Personal Safety decreased awareness, need for assist;decreased awareness, need for safety;decreased insight to deficits  -CT     Personal Safety Interventions fall prevention program maintained;gait belt;muscle strengthening facilitated;nonskid shoes/slippers when out of bed  -CT     ROM (Range of Motion)    General ROM no range of motion deficits identified  -CT     MMT (Manual Muscle Testing)    General MMT Assessment Detail BLE grossly 3/5  -CT     Muscle Tone Assessment    Muscle Tone Assessment  Bilateral Lower Extremities  -KH    Bilateral Lower Extremities Muscle Tone Assessment  mildly increased tone  -KH    Bed Mobility, Assessment/Treatment    Bed Mobility, Assistive Device bed rails  -CT     Bed Mobility, Scoot/Bridge, [place] moderate assist (50% patient effort);maximum assist (25% patient effort);2 person assist required;verbal cues required;nonverbal cues required (demo/gesture)  -CT     Bed Mob, Supine to Sit, [place] moderate assist (50% patient effort);maximum assist (25% patient effort);2 person assist required;verbal cues required;nonverbal cues required (demo/gesture)  -CT     Bed Mob, Sit to Supine, [place] moderate assist (50% patient effort);maximum assist (25% patient effort);2 person assist required;verbal cues required;nonverbal cues required (demo/gesture)  -CT     Bed Mobility, Impairments strength decreased  -CT     Transfer Assessment/Treatment    Transfers, Bed-Chair [place] maximum assist (25% patient effort);verbal cues required;nonverbal cues required (demo/gesture)   -CT     Transfers, Bed-Chair-Bed, Assist Device rolling walker  -CT     Transfers, Sit-Stand [place] maximum assist (25% patient effort);2 person assist required;verbal cues required;nonverbal cues required (demo/gesture)  -CT     Transfers, Stand-Sit [place] maximum assist (25% patient effort);2 person assist required;verbal cues required;nonverbal cues required (demo/gesture)  -CT     Transfers, Sit-Stand-Sit, Assist Device rolling walker  -CT     Transfer, Safety Issues sequencing ability decreased;balance decreased during turns  -CT     Transfer, Impairments strength decreased;impaired balance;coordination impaired  -CT     Gait Assessment/Treatment    Gait, [place] Level unable to perform  -CT     Therapy Exercises    Bilateral Lower Extremities AROM:;10 reps;sitting  -CT     Positioning and Restraints    Pre-Treatment Position in bed  -CT     Post Treatment Position chair  -CT     In Bed sitting;call light within reach;encouraged to call for assist;notified ns  -CT       01/05/18 1459 01/05/18 0900    Rehab Evaluation    Document Type evaluation  -KR     Subjective Information agree to therapy  -KR     Patient Effort, Rehab Treatment good  -KR     Living Environment    Lives With child(abner), adult  -KR     Cognitive Assessment/Intervention    Current Cognitive/Communication Assessment functional  -KR     Orientation Status oriented to;person;place  -KR     ROM (Range of Motion)    General ROM Detail BUE 4/5  -KR     MMT (Manual Muscle Testing)    General MMT Assessment Detail BUE 3-/5  -KR     Muscle Tone Assessment    Bilateral Lower Extremities Muscle Tone Assessment  mildly increased tone  -AC      01/04/18 2059 01/04/18 1111    Rehab Evaluation    Document Type  therapy note (daily note)  -RF    Subjective Information  agree to therapy  -RF    Patient Effort, Rehab Treatment  good  -RF    Symptoms Noted During/After Treatment  fatigue  -RF    General Information    Precautions/Limitations   fall precautions;oxygen therapy device and L/min  -RF    Pain Assessment    Pain Assessment  No/denies pain  -RF    Cognitive Assessment/Intervention    Current Cognitive/Communication Assessment  functional  -RF    Orientation Status  oriented to;person;place;situation  -RF    Follows Commands/Answers Questions  able to follow single-step instructions;100% of the time;needs cueing  -RF    Personal Safety  mild impairment  -RF    Personal Safety Interventions  fall prevention program maintained;muscle strengthening facilitated;gait belt;nonskid shoes/slippers when out of bed  -RF    Muscle Tone Assessment    Muscle Tone Assessment Bilateral Lower Extremities  -SM     Bilateral Lower Extremities Muscle Tone Assessment mildly decreased tone  -SM     Bed Mobility, Assessment/Treatment    Bed Mobility, Assistive Device  bed rails  -RF    Bed Mob, Supine to Sit, [place]  moderate assist (50% patient effort);2 person assist required  -RF    Bed Mob, Sit to Supine, [place]  moderate assist (50% patient effort);2 person assist required  -RF    Bed Mobility, Impairments  strength decreased;impaired balance  -RF    Transfer Assessment/Treatment    Transfers, Bed-Chair [place]  moderate assist (50% patient effort);verbal cues required;nonverbal cues required (demo/gesture)  -RF    Transfers, Sit-Stand [place]  2 person assist required;moderate assist (50% patient effort);nonverbal cues required (demo/gesture);verbal cues required  -RF    Transfers, Stand-Sit [place]  2 person assist required;moderate assist (50% patient effort);nonverbal cues required (demo/gesture);verbal cues required  -RF    Transfers, Sit-Stand-Sit, Assist Device  rolling walker  -RF    Transfer, Impairments  strength decreased;impaired balance  -RF    Transfer, Comment  Requires cues for technique and LE advancement  -RF    Gait Assessment/Treatment    Gait, [place] Level  moderate assist (50% patient effort);maximum assist  (25% patient effort);nonverbal cues required (demo/gesture);verbal cues required  -RF    Gait, Assistive Device  rolling walker  -RF    Gait, Distance (Feet)  2  -RF    Gait, Impairments  strength decreased;impaired balance  -RF    Gait, Comment  Short distance from bed to chair.   -RF    Therapy Exercises    Bilateral Lower Extremities  AROM:;10 reps;sitting  -RF    Positioning and Restraints    Pre-Treatment Position  in bed  -RF    Post Treatment Position  chair  -RF    In Chair  sitting;call light within reach;encouraged to call for assist  -RF      01/04/18 0908 01/03/18 2000    Muscle Tone Assessment    Muscle Tone Assessment Bilateral Lower Extremities  -KJ Bilateral Lower Extremities  -PM    Bilateral Lower Extremities Muscle Tone Assessment mildly decreased tone  -KJ moderately decreased tone  -PM      01/03/18 1558 01/03/18 0745    Rehab Evaluation    Document Type therapy note (daily note)  -RF     Subjective Information agree to therapy  -RF     Patient Effort, Rehab Treatment good  -RF     Symptoms Noted During/After Treatment fatigue;shortness of breath  -RF     General Information    Precautions/Limitations fall precautions;oxygen therapy device and L/min  -RF     Pain Assessment    Pain Assessment No/denies pain  -RF     Cognitive Assessment/Intervention    Current Cognitive/Communication Assessment functional  -RF     Orientation Status oriented to;person;place;situation  -RF     Follows Commands/Answers Questions able to follow single-step instructions;100% of the time;needs cueing;needs repetition  -RF     Personal Safety mild impairment  -RF     Personal Safety Interventions fall prevention program maintained;muscle strengthening facilitated;gait belt;nonskid shoes/slippers when out of bed  -RF     Muscle Tone Assessment    Muscle Tone Assessment  Bilateral Lower Extremities  -BB    Bilateral Lower Extremities Muscle Tone Assessment  moderately decreased tone  -BB    Bed Mobility,  Assessment/Treatment    Bed Mobility, Assistive Device bed rails  -RF     Bed Mob, Supine to Sit, [place] moderate assist (50% patient effort);maximum assist (25% patient effort);2 person assist required  -RF     Bed Mob, Sit to Supine, [place] moderate assist (50% patient effort);maximum assist (25% patient effort);2 person assist required  -RF     Bed Mobility, Impairments strength decreased;impaired balance  -RF     Transfer Assessment/Treatment    Transfers, Bed-Chair [place] maximum assist (25% patient effort);moderate assist (50% patient effort);verbal cues required;nonverbal cues required (demo/gesture)  -RF     Transfers, Sit-Stand [place] maximum assist (25% patient effort);2 person assist required;moderate assist (50% patient effort);nonverbal cues required (demo/gesture);verbal cues required  -RF     Transfers, Stand-Sit [place] maximum assist (25% patient effort);2 person assist required;moderate assist (50% patient effort);nonverbal cues required (demo/gesture);verbal cues required  -RF     Transfers, Sit-Stand-Sit, Assist Device rolling walker  -RF     Transfer, Impairments strength decreased;impaired balance  -RF     Gait Assessment/Treatment    Gait, [place] Level moderate assist (50% patient effort);maximum assist (25% patient effort);nonverbal cues required (demo/gesture);verbal cues required  -RF     Gait, Assistive Device rolling walker  -RF     Gait, Distance (Feet) 2  -RF     Gait, Impairments strength decreased;impaired balance  -RF     Gait, Comment Pt able to transfer from bed to chair with few steps. Cues for technique and LE advancement required.   -RF     Therapy Exercises    Bilateral Lower Extremities AROM:;10 reps;sitting  -RF     Positioning and Restraints    Pre-Treatment Position in bed  -RF     Post Treatment Position chair  -RF     In Chair sitting;call light within reach;encouraged to call for assist  -RF       User Key  (r) = Recorded By, (t) =  Taken By, (c) = Cosigned By    Initials Name Provider Type    AC Araseli Egan, RN Registered Nurse    KARLO Uriarte, OT Occupational Therapist    CT Bhargavi Vásquez, PT Physical Therapist    KH Mariana Alcazar, RN Registered Nurse    DELMI Ernst, RN Registered Nurse    PM Jaclyn Mauro, RN Registered Nurse    BB Valdez Motta, RN Registered Nurse    SHEMAR Taveras, RN Registered Nurse    ASHLY Cain PTA Physical Therapy Assistant          Physical Therapy Education     Title: PT OT SLP Therapies (Done)     Topic: Physical Therapy (Done)     Point: Mobility training (Done)    Learning Progress Summary    Learner Readiness Method Response Comment Documented by Status   Patient Acceptance E [facility] 01/05/18 1713 Done               Point: Home exercise program (Done)    Learning Progress Summary    Learner Readiness Method Response Comment Documented by Status   Patient Acceptance E [facility] 01/05/18 1713 Done               Point: Body mechanics (Done)    Learning Progress Summary    Learner Readiness Method Response Comment Documented by Status   Patient Acceptance E [facility] 01/05/18 1713 Done               Point: Precautions (Done)    Learning Progress Summary    Learner Readiness Method Response Comment Documented by Status   Patient Acceptance E [facility] 01/05/18 1713 Done                      User Key     Initials Effective Dates Name Provider Type Discipline    CT 03/14/16 -  Bhargavi Vásquez, PT Physical Therapist PT                PT Recommendation and Plan  Anticipated Equipment Needs At Discharge: front wheeled walker, bedside commode  Anticipated Discharge Disposition: home with 24/7 care, skilled nursing facility  Planned Therapy Interventions: balance training, bed mobility training, gait training, home exercise program, manual therapy techniques, neuromuscular re-education, patient/family education, strengthening, transfer training  PT Frequency: 5 times/wk,  per priority policy             IP PT Goals       01/05/18 1712 01/02/18 1727       Bed Mobility PT LTG    Bed Mobility PT LTG, Date Established 01/05/18  -CT 01/02/18  -BC     Bed Mobility PT LTG, Time to Achieve by discharge  -CT by discharge  -BC     Bed Mobility PT LTG, Activity Type all bed mobility  -CT all bed mobility  -BC     Bed Mobility PT LTG, [place] Level minimum assist (75% patient effort);moderate assist (50% patient effort)  -CT minimum assist (75% patient effort)  -BC     Bed Mobility PT Goal  LTG, Assist Device bed rails  -CT bed rails  -BC     Transfer Training PT LTG    Transfer Training PT LTG, Date Established 01/05/18  -CT 01/02/18  -BC     Transfer Training PT LTG, Time to Achieve by discharge  -CT by discharge  -BC     Transfer Training PT LTG, Activity Type bed to chair /chair to bed;sit to stand/stand to sit  -CT all transfers  -BC     Transfer Training PT LTG, [place] Level moderate assist (50% patient effort)  -CT minimum assist (75% patient effort)  -BC     Transfer Training PT LTG, Assist Device other (see comments)   with appropriate AD  -CT walker, rolling  -BC     Gait Training PT LTG    Gait Training Goal PT LTG, Date Established 01/05/18  -CT 01/02/18  -BC     Gait Training Goal PT LTG, Time to Achieve by discharge  -CT by discharge  -BC     Gait Training Goal PT LTG, [place] Level moderate assist (50% patient effort);2 person assist required  -CT minimum assist (75% patient effort);moderate assist (50% patient effort)  -BC     Gait Training Goal PT LTG, Assist Device other (see comments)   with appropriate AD  -CT walker, rolling  -BC     Gait Training Goal PT LTG, Distance to Achieve 25  -CT 20  -BC       User Key  (r) = Recorded By, (t) = Taken By, (c) = Cosigned By    Initials Name Provider Type    BC Vandana Pike, PT Physical Therapist    CT Bhargavi Vásquez, PT Physical Therapist                Outcome Measures       01/05/18 1700 01/05/18 1505 01/05/18  1500    How much help from another person do you currently need...    Turning from your back to your side while in flat bed without using bedrails? 2  -CT      Moving from lying on back to sitting on the side of a flat bed without bedrails? 2  -CT      Moving to and from a bed to a chair (including a wheelchair)? 2  -CT      Standing up from a chair using your arms (e.g., wheelchair, bedside chair)? 2  -CT      Climbing 3-5 steps with a railing? 1  -CT      To walk in hospital room? 1  -CT      AM-PAC 6 Clicks Score 10  -CT      How much help from another is currently needed...    Putting on and taking off regular lower body clothing?  2  -KR     Bathing (including washing, rinsing, and drying)  2  -KR     Toileting (which includes using toilet bed pan or urinal)  2  -KR     Putting on and taking off regular upper body clothing  2  -KR     Taking care of personal grooming (such as brushing teeth)  3  -KR     Eating meals  3  -KR     Score  14  -KR     Functional Assessment    Outcome Measure Options AM-PAC 6 Clicks Basic Mobility (PT)  -CT  AM-PAC 6 Clicks Daily Activity (OT)  -KR      01/04/18 1100 01/03/18 1600       How much help from another person do you currently need...    Turning from your back to your side while in flat bed without using bedrails? 2  -RF 2  -RF     Moving from lying on back to sitting on the side of a flat bed without bedrails? 2  -RF 2  -RF     Moving to and from a bed to a chair (including a wheelchair)? 1  -RF 1  -RF     Standing up from a chair using your arms (e.g., wheelchair, bedside chair)? 1  -RF 1  -RF     Climbing 3-5 steps with a railing? 1  -RF 1  -RF     To walk in hospital room? 1  -RF 1  -RF     AM-PAC 6 Clicks Score 8  -RF 8  -RF     Functional Assessment    Outcome Measure Options AM-PAC 6 Clicks Basic Mobility (PT)  -RF AM-PAC 6 Clicks Basic Mobility (PT)  -RF       User Key  (r) = Recorded By, (t) = Taken By, (c) = Cosigned By    Initials Name Provider Type    KARLO GORDON  [place], OT Occupational Therapist    CT Bhargavi Vásquez, PT Physical Therapist    RF Kelli Cain, PTA Physical Therapy Assistant           Time Calculation:         PT Charges       01/05/18 1714          Time Calculation    PT Received On 01/05/18  -CT      PT - Next Appointment 01/06/18  -CT      PT Goal Re-Cert Due Date 01/19/18  -CT      Time Calculation- PT    Total Timed Code Minutes- PT 46 minute(s)  -CT        User Key  (r) = Recorded By, (t) = Taken By, (c) = Cosigned By    Initials Name Provider Type    CT Bhargavi Vásquez, PT Physical Therapist          Therapy Charges for Today     Code Description Service Date Service Provider Modifiers Qty    67983946093 HC PT MOBILITY CURRENT 1/5/2018 Bhargavi Vásquez, PT GP, CL 1    03550037555 HC PT MOBILITY PROJECTED 1/5/2018 Bhargavi Vásquez, PT GP, CK 1    62332240403 HC PT EVAL HIGH COMPLEXITY 2 1/5/2018 Bhargavi Vásquez, PT GP 1    03568616102 HC PT THERAPEUTIC ACT EA 15 MIN 1/5/2018 Bhargavi Vásquez, PT GP 1    83832783556 HC PT THER SUPP EA 15 MIN 1/5/2018 Bhargavi Vásquez, PT GP 3          PT G-Codes  Outcome Measure Options: AM-PAC 6 Clicks Basic Mobility (PT)  Score: 10  Functional Limitation: Mobility: Walking and moving around  Mobility: Walking and Moving Around Current Status (): At least 60 percent but less than 80 percent impaired, limited or restricted  Mobility: Walking and Moving Around Goal Status (): At least 40 percent but less than 60 percent impaired, limited or restricted      Bhargavi Vásquez PT  1/5/2018

## 2018-01-05 NOTE — PROGRESS NOTES
Discharge Planning Assessment  [facility]     Patient Name: Tre Kinney  MRN: [id number]  Today's Date: 1/5/2018    Admit Date: 12/26/2017          Discharge Needs Assessment     None            Discharge Plan       01/05/18 1108    Final Note    Final Note Pt to be admitted to [facility] Swing Bed on this date.         Discharge Placement     No information found        Expected Discharge Date and Time     Expected Discharge Date Expected Discharge Time    Jan 5, 2018               Demographic Summary     None            Functional Status     None            Psychosocial     None            Abuse/Neglect     None            Legal     None            Substance Abuse     None            Patient Forms     None          Mary Salgado

## 2018-01-05 NOTE — PLAN OF CARE
Problem: Pressure Ulcer (Adult)  Goal: Signs and Symptoms of Listed Potential Problems Will be Absent or Manageable (Pressure Ulcer)  Outcome: Ongoing (interventions implemented as appropriate)   01/05/18 1527   Pressure Ulcer   Problems Assessed (Pressure Ulcer) all   Problems Present (Pressure Ulcer) none  (DTI to coccyx)       Problem: Fall Risk (Adult)  Goal: Identify Related Risk Factors and Signs and Symptoms  Outcome: Ongoing (interventions implemented as appropriate)   01/05/18 1527   Fall Risk   Fall Risk: Related Risk Factors gait/mobility problems;age-related changes   Fall Risk: Signs and Symptoms presence of risk factors     Goal: Absence of Falls  Outcome: Ongoing (interventions implemented as appropriate)   01/05/18 1527   Fall Risk (Adult)   Absence of Falls making progress toward outcome       Problem: Fluid Volume Excess (Adult,Obstetrics,Pediatric)  Goal: Identify Related Risk Factors and Signs and Symptoms  Outcome: Ongoing (interventions implemented as appropriate)   01/05/18 1527   Fluid Volume Excess   Fluid Volume Excess: Related Risk Factors other (see comments)   Signs and Symptoms (Fluid Volume Excess) activity intolerance     Goal: Stable Weight  Outcome: Ongoing (interventions implemented as appropriate)   01/05/18 1527   Fluid Volume Excess (Adult,Obstetrics,Pediatric)   Stable Weight making progress toward outcome     Goal: Balanced Intake/Output  Outcome: Ongoing (interventions implemented as appropriate)   01/05/18 1527   Fluid Volume Excess (Adult,Obstetrics,Pediatric)   Balanced Intake/Output making progress toward outcome

## 2018-01-05 NOTE — PLAN OF CARE
Problem: Inpatient Occupational Therapy  Goal: Strength Goal LTG- OT   01/05/18 1509   Strength OT LTG   Strength Goal OT LTG, Date Established 01/05/18   Strength Goal OT LTG, Time to Achieve by discharge   Strength Goal OT LTG, Measure to Achieve BUE increase x 1 to enhance self care/mobility     Goal: ADL Goal LTG- OT   01/05/18 1509   ADL OT LTG   ADL OT LTG, Date Established 01/05/18   ADL OT LTG, Time to Achieve by discharge   ADL OT LTG, Activity Type ADL skills   ADL OT LTG, [place] Level min assist

## 2018-01-05 NOTE — PROGRESS NOTES
Nephrology  Note      Subjective     She is using bipap. Breathing better. Was able to sit up in chair. Still has cough    Objective     Vital Signs  Temp:  [97.5 °F (36.4 °C)-98.8 °F (37.1 °C)] 98.7 °F (37.1 °C)  Heart Rate:  [75-86] 81  Resp:  [15-22] 18  BP: (125-155)/(60-91) 155/91       I/O last 3 completed shifts:  In: 820 [P.O.:720; IV Piggyback:100]  Out: 2550 [Urine:2550]    Physical Examination:    General Appearance : alert,  no distress  Head : normocephalic  Eyes :  no pallor   Throat : oral mucosa moist  Neck:  no JVD  Lungs : wheezing, reduced breath sounds at left base  Heart : regular rhythm & normal rate, normal S1, S2, no murmur  Abdomen :  normal bowel sounds, no masses and soft non-tender  Extremities :  minimal edema  Neurologic : orientated to person, place, time, grossly no focal deficitis    Laboratory Data :      WBC WBC   Date Value Ref Range Status   01/05/2018 4.89 4.50 - 12.50 10*3/mm3 Final   01/04/2018 10.17 4.50 - 12.50 10*3/mm3 Final   01/03/2018 10.06 4.50 - 12.50 10*3/mm3 Final      HGB Hemoglobin   Date Value Ref Range Status   01/05/2018 8.9 (L) 12.0 - 16.0 g/dL Final   01/04/2018 10.3 (L) 12.0 - 16.0 g/dL Final   01/03/2018 9.7 (L) 12.0 - 16.0 g/dL Final      HCT Hematocrit   Date Value Ref Range Status   01/05/2018 28.9 (L) 37.0 - 47.0 % Final   01/04/2018 33.5 (L) 37.0 - 47.0 % Final   01/03/2018 31.0 (L) 37.0 - 47.0 % Final      Platlets No results found for: LABPLAT   MCV MCV   Date Value Ref Range Status   01/05/2018 83.8 80.0 - 94.0 fL Final   01/04/2018 86.3 80.0 - 94.0 fL Final   01/03/2018 82.2 80.0 - 94.0 fL Final          Sodium Sodium   Date Value Ref Range Status   01/05/2018 135 135 - 153 mmol/L Final   01/04/2018 137 135 - 153 mmol/L Final   01/03/2018 139 135 - 153 mmol/L Final      Potassium Potassium   Date Value Ref Range Status   01/05/2018 3.7 3.5 - 5.3 mmol/L Final   01/04/2018 3.6 3.5 - 5.3 mmol/L Final     Comment:     1+ Hemolysis    01/03/2018 3.7 3.5  - 5.3 mmol/L Final      Chloride Chloride   Date Value Ref Range Status   01/05/2018 97 (L) 99 - 112 mmol/L Final   01/04/2018 96 (L) 99 - 112 mmol/L Final   01/03/2018 99 99 - 112 mmol/L Final      CO2 CO2   Date Value Ref Range Status   01/05/2018 28.8 24.3 - 31.9 mmol/L Final   01/04/2018 30.7 24.3 - 31.9 mmol/L Final   01/03/2018 28.2 24.3 - 31.9 mmol/L Final      BUN BUN   Date Value Ref Range Status   01/05/2018 52 (H) 7 - 21 mg/dL Final   01/04/2018 48 (H) 7 - 21 mg/dL Final   01/03/2018 43 (H) 7 - 21 mg/dL Final      Creatinine Creatinine   Date Value Ref Range Status   01/05/2018 2.29 (H) 0.43 - 1.29 mg/dL Final   01/04/2018 2.20 (H) 0.43 - 1.29 mg/dL Final   01/03/2018 2.13 (H) 0.43 - 1.29 mg/dL Final      Calcium Calcium   Date Value Ref Range Status   01/05/2018 8.1 7.7 - 10.0 mg/dL Final   01/04/2018 8.4 7.7 - 10.0 mg/dL Final   01/03/2018 8.5 7.7 - 10.0 mg/dL Final      PO4 No results found for: CAPO4   Albumin Albumin   Date Value Ref Range Status   01/05/2018 3.30 (L) 3.40 - 4.80 g/dL Final      Magnesium Magnesium   Date Value Ref Range Status   01/02/2018 2.0 1.7 - 2.6 mg/dL Final      Uric Acid No results found for: URICACID     Radiology results :     Imaging Results (last 72 hours)     ** No results found for the last 72 hours. **        Imaging Results (last 24 hours)     ** No results found for the last 24 hours. **            Medications:        aspirin 81 mg Oral Daily   atorvastatin 40 mg Oral Nightly   citalopram 20 mg Oral Daily   furosemide 40 mg Oral Daily   gabapentin 400 mg Oral Nightly   hydrALAZINE 25 mg Oral Q12H   ipratropium-albuterol 3 mL Nebulization 4x Daily - RT   isosorbide mononitrate 60 mg Oral Daily   metoprolol succinate  mg Oral Q24H   pantoprazole 40 mg Oral Daily   piperacillin-tazobactam 2.25 g Intravenous Q6H   spironolactone 25 mg Oral Daily   vitamin d 5,000 Units Oral Daily       Pharmacy to dose warfarin        Assessment/Plan     Active Problems:    CHF  (congestive heart failure)      1. DIANA on CKD 4 : her baseline creatinine has been around 2. 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     2. systolic CHF :  Compensated, reduce lasix 40 mg po qd    3. HTN : keep -150/80s for her age    4. Moderate pulmonary HTN    5. HCAP      6. Urinary retention :  UA negative.  bladder training in progress    I discussed the patients findings and my recommendations with patient and nursing staff    Sebas Ellis MD  01/05/18  7:02 AM

## 2018-01-05 NOTE — NURSING NOTE
Upon performing the admission skin assessment to [facility] bed from [address] [place] [address] with SONAM Mccormack at bedside, a small purple, deep reddened area that was non-blanchable noted to the coccyx. Pictures were taken and a wound care consult was ordered. Made CNA aware of turning the patient every two hours and to keep pressure off the area.

## 2018-01-05 NOTE — PLAN OF CARE
Problem: Inpatient Physical Therapy  Goal: Bed Mobility Goal LTG- PT   01/05/18 1712   Bed Mobility PT LTG   Bed Mobility PT LTG, Date Established 01/05/18   Bed Mobility PT LTG, Time to Achieve by discharge   Bed Mobility PT LTG, Activity Type all bed mobility   Bed Mobility PT LTG, [place] Level minimum assist (75% patient effort);moderate assist (50% patient effort)   Bed Mobility PT Goal LTG, Assist Device bed rails     Goal: Transfer Training Goal 1 LTG- PT   01/05/18 1712   Transfer Training PT LTG   Transfer Training PT LTG, Date Established 01/05/18   Transfer Training PT LTG, Time to Achieve by discharge   Transfer Training PT LTG, Activity Type bed to chair /chair to bed;sit to stand/stand to sit   Transfer Training PT LTG, [place] Level moderate assist (50% patient effort)   Transfer Training PT LTG, Assist Device other (see comments)  (with appropriate AD)     Goal: Gait Training Goal LTG- PT   01/05/18 1712   Gait Training PT LTG   Gait Training Goal PT LTG, Date Established 01/05/18   Gait Training Goal PT LTG, Time to Achieve by discharge   Gait Training Goal PT LTG, [place] Level moderate assist (50% patient effort);2 person assist required   Gait Training Goal PT LTG, Assist Device other (see comments)  (with appropriate AD)   Gait Training Goal PT LTG, Distance to Achieve 25

## 2018-01-05 NOTE — NURSING NOTE
DTI noted to coccyx at time of swingbed admission.  DTI is purple, nonblanchable, and measures 1.5x0.5cm.  Treatment ordered.     01/05/18 1500   Pressure Ulcer 01/05/18 1506 medial coccyx suspected deep tissue injury   Date first assessed/Time first assessed: 01/05/18 1506   Present On Admission (Pressure Ulcer): no;picture taken  Orientation: medial  Location: coccyx  Stage: suspected deep tissue injury   Dressing Appearance other (see comments)  (open to air)   Pressure Ulcer Appearance purple   Periwound Area intact;pink;dry;blanchable   Length (Pressure Ulcer) (cm) 1.5   Width (Pressure Ulcer) (cm) 0.5

## 2018-01-06 LAB
ANION GAP SERPL CALCULATED.3IONS-SCNC: 10.4 MMOL/L (ref 3.6–11.2)
BUN BLD-MCNC: 40 MG/DL (ref 7–21)
BUN/CREAT SERPL: 18.4 (ref 7–25)
CALCIUM SPEC-SCNC: 8 MG/DL (ref 7.7–10)
CHLORIDE SERPL-SCNC: 98 MMOL/L (ref 99–112)
CO2 SERPL-SCNC: 26.6 MMOL/L (ref 24.3–31.9)
CREAT BLD-MCNC: 2.17 MG/DL (ref 0.43–1.29)
GFR SERPL CREATININE-BSD FRML MDRD: 22 ML/MIN/1.73
GLUCOSE BLD-MCNC: 83 MG/DL (ref 70–110)
INR PPP: 5.81 (ref 0.9–1.1)
OSMOLALITY SERPL CALC.SUM OF ELEC: 279 MOSM/KG (ref 273–305)
POTASSIUM BLD-SCNC: 4.2 MMOL/L (ref 3.5–5.3)
PROTHROMBIN TIME: 53.2 SECONDS (ref 11–15.4)
SODIUM BLD-SCNC: 135 MMOL/L (ref 135–153)

## 2018-01-06 PROCEDURE — 94799 UNLISTED PULMONARY SVC/PX: CPT

## 2018-01-06 PROCEDURE — 97530 THERAPEUTIC ACTIVITIES: CPT

## 2018-01-06 PROCEDURE — 97116 GAIT TRAINING THERAPY: CPT

## 2018-01-06 PROCEDURE — 85610 PROTHROMBIN TIME: CPT | Performed by: FAMILY MEDICINE

## 2018-01-06 PROCEDURE — 80048 BASIC METABOLIC PNL TOTAL CA: CPT | Performed by: FAMILY MEDICINE

## 2018-01-06 RX ADMIN — HYDRALAZINE HYDROCHLORIDE 25 MG: 25 TABLET ORAL at 20:48

## 2018-01-06 RX ADMIN — SPIRONOLACTONE 25 MG: 25 TABLET ORAL at 08:09

## 2018-01-06 RX ADMIN — AMOXICILLIN AND CLAVULANATE POTASSIUM 500 MG: 500; 125 TABLET, FILM COATED ORAL at 20:48

## 2018-01-06 RX ADMIN — HYDROCODONE BITARTRATE AND ACETAMINOPHEN 1 TABLET: 7.5; 325 TABLET ORAL at 20:55

## 2018-01-06 RX ADMIN — CITALOPRAM HYDROBROMIDE 20 MG: 20 TABLET ORAL at 08:09

## 2018-01-06 RX ADMIN — IPRATROPIUM BROMIDE AND ALBUTEROL SULFATE 3 ML: .5; 3 SOLUTION RESPIRATORY (INHALATION) at 20:11

## 2018-01-06 RX ADMIN — AMOXICILLIN AND CLAVULANATE POTASSIUM 500 MG: 500; 125 TABLET, FILM COATED ORAL at 08:09

## 2018-01-06 RX ADMIN — IPRATROPIUM BROMIDE AND ALBUTEROL SULFATE 3 ML: .5; 3 SOLUTION RESPIRATORY (INHALATION) at 13:05

## 2018-01-06 RX ADMIN — FUROSEMIDE 40 MG: 40 TABLET ORAL at 08:09

## 2018-01-06 RX ADMIN — CASTOR OIL AND BALSAM, PERU: 788; 87 OINTMENT TOPICAL at 08:10

## 2018-01-06 RX ADMIN — ASPIRIN 81 MG: 81 TABLET ORAL at 08:09

## 2018-01-06 RX ADMIN — IPRATROPIUM BROMIDE AND ALBUTEROL SULFATE 3 ML: .5; 3 SOLUTION RESPIRATORY (INHALATION) at 00:23

## 2018-01-06 RX ADMIN — CASTOR OIL AND BALSAM, PERU: 788; 87 OINTMENT TOPICAL at 20:48

## 2018-01-06 RX ADMIN — IPRATROPIUM BROMIDE AND ALBUTEROL SULFATE 3 ML: .5; 3 SOLUTION RESPIRATORY (INHALATION) at 07:15

## 2018-01-06 RX ADMIN — ISOSORBIDE MONONITRATE 60 MG: 60 TABLET, EXTENDED RELEASE ORAL at 08:09

## 2018-01-06 RX ADMIN — HYDRALAZINE HYDROCHLORIDE 25 MG: 25 TABLET ORAL at 08:09

## 2018-01-06 RX ADMIN — CHOLECALCIFEROL CAP 125 MCG (5000 UNIT) 5000 UNITS: 125 CAP at 08:09

## 2018-01-06 RX ADMIN — GABAPENTIN 400 MG: 400 CAPSULE ORAL at 20:48

## 2018-01-06 RX ADMIN — PANTOPRAZOLE SODIUM 40 MG: 40 TABLET, DELAYED RELEASE ORAL at 08:09

## 2018-01-06 RX ADMIN — METOPROLOL SUCCINATE 100 MG: 50 TABLET, FILM COATED, EXTENDED RELEASE ORAL at 08:09

## 2018-01-06 RX ADMIN — ATORVASTATIN CALCIUM 40 MG: 40 TABLET, FILM COATED ORAL at 20:48

## 2018-01-06 NOTE — PROGRESS NOTES
Inpatient Rehabilitation - Occupational Therapy Treatment Note   [facility]     Patient Name: Tre Kinney  : 1932  MRN: [id number]  Today's Date: 2018  Onset of Illness/Injury or Date of Surgery Date: 18 (swing bed admit date)            Admit Date: 2018    Visit Dx:   No diagnosis found.  Patient Active Problem List   Diagnosis   • Acid reflux   • Ankle arthralgia   • Cardiac conduction disorder   • Closed fracture of distal fibula   • Arteriosclerosis of coronary artery   • Fracture of distal end of tibia   • Elevated cholesterol   • BP (high blood pressure)   • Hypertrophic polyarthritis   • OP (osteoporosis)   • Right heart failure   • Heart failure, chronic, right-sided   • SOB (shortness of breath)   • Palpitations   • Acute CHF   • CHF (congestive heart failure)   • Congestive heart failure   • Precordial pain             Adult Rehabilitation Note       18 1131 18 1118 18 1111    Rehab Assessment/Intervention    Discipline occupational therapist  - physical therapist  -BC physical therapy assistant  -RF    Document Type therapy note (daily note)  - therapy note (daily note)  -BC therapy note (daily note)  -RF    Subjective Information complains of;weakness;fatigue  - agree to therapy;complains of;weakness  -BC agree to therapy  -RF    Patient Effort, Rehab Treatment  poor  -BC good  -RF    Treatment Not Performed patient/family declined treatment  -      Symptoms Noted During/After Treatment  fatigue  -BC fatigue  -RF    Precautions/Limitations  fall precautions;oxygen therapy device and L/min  -BC fall precautions;oxygen therapy device and L/min  -RF    Patient Response to Treatment   Patient tolerant to treatment session with good activity tolerance noted. Pt demonstrates improving functional mobility with transferring, but continues to require assistance and cuing due to LE weakness.  -RF    Recorded by [] Lois Shetty, OT [BC] Vandana Pike, PT  [RF] Kelli Cain PTA    Pain Assessment    Pain Assessment  No/denies pain  -BC No/denies pain  -RF    Recorded by  [BC] Vandana Pike, PT [RF] Kelli Cain PTA    Cognitive Assessment/Intervention    Current Cognitive/Communication Assessment  functional  -BC functional  -RF    Orientation Status  oriented to;person;place;situation  -BC oriented to;person;place;situation  -RF    Follows Commands/Answers Questions  able to follow single-step instructions;100% of the time  -BC able to follow single-step instructions;100% of the time;needs cueing  -RF    Personal Safety  decreased awareness, need for safety;decreased insight to deficits  -BC mild impairment  -RF    Personal Safety Interventions  fall prevention program maintained;gait belt;muscle strengthening facilitated;nonskid shoes/slippers when out of bed  -BC fall prevention program maintained;muscle strengthening facilitated;gait belt;nonskid shoes/slippers when out of bed  -RF    Recorded by  [BC] Vandana Pike, PT [RF] Kelli Cain, NACHO    Bed Mobility, Assessment/Treatment    Bed Mobility, Assistive Device  bed rails  -BC bed rails  -RF    Bed Mobility, [place]/Bridge, [place]  moderate assist (50% patient effort);maximum assist (25% patient effort);2 person assist required;verbal cues required;nonverbal cues required (demo/gesture)  -BC     Bed Mob, Supine to Sit, [place]  moderate assist (50% patient effort);maximum assist (25% patient effort);2 person assist required;verbal cues required;nonverbal cues required (demo/gesture)  -BC moderate assist (50% patient effort);2 person assist required  -RF    Bed Mob, Sit to Supine, [place]  moderate assist (50% patient effort);maximum assist (25% patient effort);2 person assist required;verbal cues required;nonverbal cues required (demo/gesture)  -BC moderate assist (50% patient effort);2 person assist required  -RF    Bed Mobility, Impairments  strength decreased  -BC strength  decreased;impaired balance  -RF    Recorded by  [BC] Vandana Pike, PT [RF] Kelli Cain, NACHO    Transfer Assessment/Treatment    Transfers, Bed-Chair [place]  maximum assist (25% patient effort);verbal cues required;nonverbal cues required (demo/gesture)  -BC moderate assist (50% patient effort);verbal cues required;nonverbal cues required (demo/gesture)  -RF    Transfers, Bed-Chair-Bed, Assist Device  rolling walker  -BC     Transfers, Sit-Stand [place]  maximum assist (25% patient effort);2 person assist required;verbal cues required;nonverbal cues required (demo/gesture)  -BC 2 person assist required;moderate assist (50% patient effort);nonverbal cues required (demo/gesture);verbal cues required  -RF    Transfers, Stand-Sit [place]  maximum assist (25% patient effort);2 person assist required;verbal cues required;nonverbal cues required (demo/gesture)  -BC 2 person assist required;moderate assist (50% patient effort);nonverbal cues required (demo/gesture);verbal cues required  -RF    Transfers, Sit-Stand-Sit, Assist Device  rolling walker  -BC rolling walker  -RF    Transfer, Safety Issues  sequencing ability decreased;balance decreased during turns  -BC     Transfer, Impairments  strength decreased;impaired balance;coordination impaired  -BC strength decreased;impaired balance  -RF    Transfer, Comment   Requires cues for technique and LE advancement  -RF    Recorded by  [BC] Vandana Pike, PT [RF] Kelli Cain, NACHO    Gait Assessment/Treatment    Gait, [place] Level  moderate assist (50% patient effort);maximum assist (25% patient effort);2 person assist required  -BC moderate assist (50% patient effort);maximum assist (25% patient effort);nonverbal cues required (demo/gesture);verbal cues required  -RF    Gait, Assistive Device  rolling walker  -BC rolling walker  -RF    Gait, Distance (Feet)  12  -BC 2  -RF    Gait, Safety Issues  balance decreased during turns;sequencing  ability decreased;supplemental O2;step length decreased  -BC     Gait, Impairments   strength decreased;impaired balance  -RF    Gait, Comment   Short distance from bed to chair.   -RF    Recorded by  [BC] Vandana Pike, PT [RF] Kelli Cain PTA    Therapy Exercises    Bilateral Lower Extremities  AROM:;10 reps;sitting  -BC AROM:;10 reps;sitting  -RF    Recorded by  [BC] Vandana Pike, PT [RF] Kelli Cain PTA    Positioning and Restraints    Pre-Treatment Position  in bed  -BC in bed  -RF    Post Treatment Position  chair  -BC chair  -RF    In Bed  notified nsg;supine;call light within reach;encouraged to call for assist;side rails up x3  -BC     In Chair   sitting;call light within reach;encouraged to call for assist  -RF    Recorded by  [BC] Vandana Pike, PT [RF] Kelli Cain PTA      01/03/18 3263          Rehab Assessment/Intervention    Discipline physical therapy assistant  -RF      Document Type therapy note (daily note)  -RF      Subjective Information agree to therapy  -RF      Patient Effort, Rehab Treatment good  -RF      Symptoms Noted During/After Treatment fatigue;shortness of breath  -RF      Precautions/Limitations fall precautions;oxygen therapy device and L/min  -RF      Patient Response to Treatment Patient demonstrated improved tolerance to treatmwnt with improved funtional mobility noted. Pt demonstrated some SOB, but was able to recover with PLB.   -RF      Recorded by [RF] Kelli Cain PTA      Pain Assessment    Pain Assessment No/denies pain  -RF      Recorded by [RF] Kelli Cain PTA      Cognitive Assessment/Intervention    Current Cognitive/Communication Assessment functional  -RF      Orientation Status oriented to;person;place;situation  -RF      Follows Commands/Answers Questions able to follow single-step instructions;100% of the time;needs cueing;needs repetition  -RF      Personal Safety mild impairment  -RF      Personal Safety Interventions fall  prevention program maintained;muscle strengthening facilitated;gait belt;nonskid shoes/slippers when out of bed  -RF      Recorded by [RF] Kelli Cain, NACHO      Bed Mobility, Assessment/Treatment    Bed Mobility, Assistive Device bed rails  -RF      Bed Mob, Supine to Sit, [place] moderate assist (50% patient effort);maximum assist (25% patient effort);2 person assist required  -RF      Bed Mob, Sit to Supine, [place] moderate assist (50% patient effort);maximum assist (25% patient effort);2 person assist required  -RF      Bed Mobility, Impairments strength decreased;impaired balance  -RF      Recorded by [RF] Kelli Cain, NACHO      Transfer Assessment/Treatment    Transfers, Bed-Chair [place] maximum assist (25% patient effort);moderate assist (50% patient effort);verbal cues required;nonverbal cues required (demo/gesture)  -RF      Transfers, Sit-Stand [place] maximum assist (25% patient effort);2 person assist required;moderate assist (50% patient effort);nonverbal cues required (demo/gesture);verbal cues required  -RF      Transfers, Stand-Sit [place] maximum assist (25% patient effort);2 person assist required;moderate assist (50% patient effort);nonverbal cues required (demo/gesture);verbal cues required  -RF      Transfers, Sit-Stand-Sit, Assist Device rolling walker  -RF      Transfer, Impairments strength decreased;impaired balance  -RF      Recorded by [RF] Kelli Cain, PTA      Gait Assessment/Treatment    Gait, [place] Level moderate assist (50% patient effort);maximum assist (25% patient effort);nonverbal cues required (demo/gesture);verbal cues required  -RF      Gait, Assistive Device rolling walker  -RF      Gait, Distance (Feet) 2  -RF      Gait, Impairments strength decreased;impaired balance  -RF      Gait, Comment Pt able to transfer from bed to chair with few steps. Cues for technique and LE advancement required.   -RF      Recorded by [RF] Kelli STEVENS  NACHO Cain      Therapy Exercises    Bilateral Lower Extremities AROM:;10 reps;sitting  -RF      Recorded by [RF] Kelli Cain PTA      Positioning and Restraints    Pre-Treatment Position in bed  -RF      Post Treatment Position chair  -RF      In Chair sitting;call light within reach;encouraged to call for assist  -RF      Recorded by [RF] Kelli Cain PTA        User Key  (r) = Recorded By, (t) = Taken By, (c) = Cosigned By    Initials Name Effective Dates    BC Vandana KWAN Shahzad, PT 03/14/16 -     AH Lois Shetty, OT 03/14/16 -     RF Kelli Cain, PTA 07/19/17 -                 OT Goals       01/05/18 1509          Strength OT LTG    Strength Goal OT LTG, Date Established 01/05/18  -KR      Strength Goal OT LTG, Time to Achieve by discharge  -KR      Strength Goal OT LTG, Measure to Achieve BUE increase x 1 to enhance self care/mobility  -KR      ADL OT LTG    ADL OT LTG, Date Established 01/05/18  -KR      ADL OT LTG, Time to Achieve by discharge  -KR      ADL OT LTG, Activity Type ADL skills  -KR      ADL OT LTG, [place] Level min assist  -KR        User Key  (r) = Recorded By, (t) = Taken By, (c) = Cosigned By    Initials Name Provider Type    KR Andres Uriarte, OT Occupational Therapist                OT Recommendation and Plan  Planned Therapy Interventions: activity intolerance, ADL retraining, strengthening  Therapy Frequency: 3-5 times/wk           Outcome Measures       01/06/18 1100 01/05/18 1700 01/05/18 1505    How much help from another person do you currently need...    Turning from your back to your side while in flat bed without using bedrails? 2  -BC 2  -CT     Moving from lying on back to sitting on the side of a flat bed without bedrails? 2  -BC 2  -CT     Moving to and from a bed to a chair (including a wheelchair)? 2  -BC 2  -CT     Standing up from a chair using your arms (e.g., wheelchair, bedside chair)? 2  -BC 2  -CT     Climbing 3-5 steps with a railing? 1  -BC  1  -CT     To walk in hospital room? 1  -BC 1  -CT     AM-PAC 6 Clicks Score 10  -BC 10  -CT     How much help from another is currently needed...    Putting on and taking off regular lower body clothing?   2  -KR    Bathing (including washing, rinsing, and drying)   2  -KR    Toileting (which includes using toilet bed pan or urinal)   2  -KR    Putting on and taking off regular upper body clothing   2  -KR    Taking care of personal grooming (such as brushing teeth)   3  -KR    Eating meals   3  -KR    Score   14  -KR    Functional Assessment    Outcome Measure Options AM-PAC 6 Clicks Basic Mobility (PT)  -BC AM-PAC 6 Clicks Basic Mobility (PT)  -CT       01/05/18 1500 01/04/18 1100 01/03/18 1600    How much help from another person do you currently need...    Turning from your back to your side while in flat bed without using bedrails?  2  -RF 2  -RF    Moving from lying on back to sitting on the side of a flat bed without bedrails?  2  -RF 2  -RF    Moving to and from a bed to a chair (including a wheelchair)?  1  -RF 1  -RF    Standing up from a chair using your arms (e.g., wheelchair, bedside chair)?  1  -RF 1  -RF    Climbing 3-5 steps with a railing?  1  -RF 1  -RF    To walk in hospital room?  1  -RF 1  -RF    AM-PAC 6 Clicks Score  8  -RF 8  -RF    Functional Assessment    Outcome Measure Options AM-PAC 6 Clicks Daily Activity (OT)  -KR AM-PAC 6 Clicks Basic Mobility (PT)  -RF AM-PAC 6 Clicks Basic Mobility (PT)  -RF      User Key  (r) = Recorded By, (t) = Taken By, (c) = Cosigned By    Initials Name Provider Type    BC Vandana Pike, PT Physical Therapist    KR Andres Uriarte, OT Occupational Therapist    CT Bhargavi Vásquez, PT Physical Therapist    RF Kelli Cain, PTA Physical Therapy Assistant           Time Calculation:            OT G-codes  Functional Limitation: Self care  Self Care Current Status (): At least 60 percent but less than 80 percent impaired, limited or restricted  Self Care  Goal Status (): At least 20 percent but less than 40 percent impaired, limited or restricted    Lois Shetty, OT  1/6/2018

## 2018-01-06 NOTE — PLAN OF CARE
Problem: Pressure Ulcer (Adult)  Goal: Signs and Symptoms of Listed Potential Problems Will be Absent or Manageable (Pressure Ulcer)  Outcome: Ongoing (interventions implemented as appropriate)   01/05/18 1527   Pressure Ulcer   Problems Assessed (Pressure Ulcer) all   Problems Present (Pressure Ulcer) none  (DTI to coccyx)       Problem: Fall Risk (Adult)  Goal: Identify Related Risk Factors and Signs and Symptoms  Outcome: Ongoing (interventions implemented as appropriate)   01/05/18 1527   Fall Risk   Fall Risk: Related Risk Factors gait/mobility problems;age-related changes   Fall Risk: Signs and Symptoms presence of risk factors     Goal: Absence of Falls  Outcome: Ongoing (interventions implemented as appropriate)   01/06/18 0327   Fall Risk (Adult)   Absence of Falls making progress toward outcome       Problem: Fluid Volume Excess (Adult,Obstetrics,Pediatric)  Goal: Identify Related Risk Factors and Signs and Symptoms  Outcome: Ongoing (interventions implemented as appropriate)   01/05/18 1527   Fluid Volume Excess   Fluid Volume Excess: Related Risk Factors other (see comments)   Signs and Symptoms (Fluid Volume Excess) activity intolerance     Goal: Stable Weight  Outcome: Ongoing (interventions implemented as appropriate)   01/06/18 0327   Fluid Volume Excess (Adult,Obstetrics,Pediatric)   Stable Weight making progress toward outcome     Goal: Balanced Intake/Output  Outcome: Ongoing (interventions implemented as appropriate)   01/06/18 0327   Fluid Volume Excess (Adult,Obstetrics,Pediatric)   Balanced Intake/Output making progress toward outcome

## 2018-01-06 NOTE — PLAN OF CARE
Problem: Skin Integrity Impairment, Risk/Actual (Adult)  Goal: Identify Related Risk Factors and Signs and Symptoms  Outcome: Ongoing (interventions implemented as appropriate)   01/06/18 0328   Skin Integrity Impairment, Risk/Actual   Skin Integrity Impairment, Risk/Actual: Related Risk Factors age extremes   Signs and Symptoms (Skin Integrity Impairment) erythema nonblanchable  (DTI)     Goal: Skin Integrity/Wound Healing  Outcome: Ongoing (interventions implemented as appropriate)   01/06/18 0328   Skin Integrity Impairment, Risk/Actual (Adult)   Skin Integrity/Wound Healing making progress toward outcome

## 2018-01-06 NOTE — PLAN OF CARE
Problem: Pressure Ulcer (Adult)  Goal: Signs and Symptoms of Listed Potential Problems Will be Absent or Manageable (Pressure Ulcer)  Outcome: Ongoing (interventions implemented as appropriate)   01/05/18 1527 01/06/18 0938   Pressure Ulcer   Problems Assessed (Pressure Ulcer) --  all   Problems Present (Pressure Ulcer) none  (DTI to coccyx) --        Problem: Fall Risk (Adult)  Goal: Identify Related Risk Factors and Signs and Symptoms  Outcome: Ongoing (interventions implemented as appropriate)    Goal: Absence of Falls  Outcome: Ongoing (interventions implemented as appropriate)    Goal: Identify Related Risk Factors and Signs and Symptoms  Outcome: Ongoing (interventions implemented as appropriate)    Goal: Absence of Falls  Outcome: Ongoing (interventions implemented as appropriate)      Problem: Fluid Volume Excess (Adult,Obstetrics,Pediatric)  Goal: Identify Related Risk Factors and Signs and Symptoms  Outcome: Ongoing (interventions implemented as appropriate)    Goal: Stable Weight  Outcome: Ongoing (interventions implemented as appropriate)    Goal: Balanced Intake/Output  Outcome: Ongoing (interventions implemented as appropriate)      Problem: Skin Integrity Impairment, Risk/Actual (Adult)  Goal: Identify Related Risk Factors and Signs and Symptoms  Outcome: Ongoing (interventions implemented as appropriate)    Goal: Skin Integrity/Wound Healing  Outcome: Ongoing (interventions implemented as appropriate)

## 2018-01-06 NOTE — PROGRESS NOTES
Discharge Planning Assessment   [place]     Patient Name: Tre Kinney  MRN: [id number]  Today's Date: 1/6/2018    Admit Date: 1/5/2018          Discharge Needs Assessment       01/06/18 1149    Living Environment    Lives With child(abner), adult   Pt admitted to swing bed on 1/5/18. Pt lives at home with her son Casey. Pt plans to return home at discharge.     Living Environment    Quality Of Family Relationships supportive    Able to Return to Prior Living Arrangements yes    Discharge Needs Assessment    Equipment Currently Used at Home hospital bed;walker, rolling;commode;oxygen   DME provider is [facility].     Equipment Needed After Discharge none            Discharge Plan       01/06/18 1151    Case Management/Social Work Plan    Plan Pt lives at home and plans to return home at discharge. Pt currently does not have home health services. Pt utilizes a hospital bed, rolling walker, bedside commode, and home 02. Pt utilizes [facility] with no issues. SS will follow and assist as needed.     Patient/Family In Agreement With Plan yes             Demographic Summary       01/06/18 1149    Referral Information    Referral Source nursing    Reason For Consult discharge planning         Legal       01/06/18 1149    Legal    Legal Comments Pt does not have a POA or advance directive at this time. Pt is not interested in information.         Jessi Sheffield

## 2018-01-06 NOTE — PROGRESS NOTES
Tre Kinney     LOS: 1 day   Patient Care Team:  Myles Barone MD as PCP - General  Myles Barone MD as PCP - Family Medicine  Myles Barone MD as PCP - Claims Attributed  Kelli Márquez RN as Care Coordinator (Population Health)      Subjective     Interval History:     Patient Complaints: Continues with some shortness of breath and cough  Patient Denies:  Chest pain  History taken from: patient chart    Review of Systems:    All systems were reviewed and negative except for:    Objective     Vital Signs  Temp:  [98.3 °F (36.8 °C)-98.8 °F (37.1 °C)] 98.3 °F (36.8 °C)  Heart Rate:  [76-84] 80  Resp:  [18-24] 20  BP: (127-143)/(70-82) 132/72  Lab Results (last 24 hours)     Procedure Component Value Units Date/Time    Basic Metabolic Panel [[id number]]  (Abnormal) Collected:  01/06/18 0135    Specimen:  Blood Updated:  01/06/18 0244     Glucose 83 mg/dL      BUN 40 (H) mg/dL      Creatinine 2.17 (H) mg/dL      Sodium 135 mmol/L      Potassium 4.2 mmol/L       1+ Hemolysis         Chloride 98 (L) mmol/L      CO2 26.6 mmol/L      Calcium 8.0 mg/dL      eGFR Non African Amer 22 (L) mL/min/1.73      BUN/Creatinine Ratio 18.4     Anion Gap 10.4 mmol/L     Narrative:       The MDRD GFR formula is only valid for adults with stable renal function between ages 18 and 70.    Osmolality, Calculated [[id number]]  (Normal) Collected:  01/06/18 0135    Specimen:  Blood Updated:  01/06/18 0244     Osmolality Calc 279.0 mOsm/kg     Protime-INR [[id number]]  (Abnormal) Collected:  01/06/18 0135    Specimen:  Blood Updated:  01/06/18 0250     Protime 53.2 (C) Seconds      INR 5.81 (C)    Narrative:       Suggested INR therapeutic range for stable oral anticoagulant therapy:    Low Intensity therapy:   1.5-2.0  Moderate Intensity therapy:   2.0-3.0  High Intensity therapy:   2.5-4.0            Physical Exam:     General Appearance:    Alert, cooperative, in no acute distress   Head:    Normocephalic, without  obvious abnormality, atraumatic   Eyes:            Lids and lashes normal, conjunctivae and sclerae normal, no   icterus, no pallor, corneas clear, PERRLA   Ears:    Ears appear intact with no abnormalities noted   Throat:   No oral lesions, no thrush, oral mucosa moist   Neck:   No adenopathy, supple, trachea midline, no thyromegaly, no     carotid bruit, no JVD   Back:     No kyphosis present, no scoliosis present, no skin lesions,       erythema or scars, no tenderness to percussion or                   palpation,   range of motion normal   Lungs:    Bilateral rhonchi with decreased breath sounds in the bases     Heart:    Regular rhythm and normal rate, normal S1 and S2, no            murmur, no gallop, no rub, no click   Breast Exam:    Deferred   Abdomen:     Normal bowel sounds, no masses, no organomegaly, soft        non-tender, non-distended, no guarding, no rebound                 tenderness   Genitalia:    Deferred   Extremities:  1+ lower extremity edema    Pulses:   Pulses palpable and equal bilaterally   Skin:   No bleeding, bruising or rash   Lymph nodes:   No palpable adenopathy   Neurologic:   Cranial nerves 2 - 12 grossly intact, sensation intact, DTR        present and equal bilaterally     Lab Results (last 24 hours)     Procedure Component Value Units Date/Time    Basic Metabolic Panel [[id number]]  (Abnormal) Collected:  01/06/18 0135    Specimen:  Blood Updated:  01/06/18 0244     Glucose 83 mg/dL      BUN 40 (H) mg/dL      Creatinine 2.17 (H) mg/dL      Sodium 135 mmol/L      Potassium 4.2 mmol/L       1+ Hemolysis         Chloride 98 (L) mmol/L      CO2 26.6 mmol/L      Calcium 8.0 mg/dL      eGFR Non African Amer 22 (L) mL/min/1.73      BUN/Creatinine Ratio 18.4     Anion Gap 10.4 mmol/L     Narrative:       The MDRD GFR formula is only valid for adults with stable renal function between ages 18 and 70.    Osmolality, Calculated [[id number]]  (Normal) Collected:  01/06/18 0135    Specimen:   Blood Updated:  01/06/18 0244     Osmolality Calc 279.0 mOsm/kg     Protime-INR [[id number]]  (Abnormal) Collected:  01/06/18 0135    Specimen:  Blood Updated:  01/06/18 0250     Protime 53.2 (C) Seconds      INR 5.81 (C)    Narrative:       Suggested INR therapeutic range for stable oral anticoagulant therapy:    Low Intensity therapy:   1.5-2.0  Moderate Intensity therapy:   2.0-3.0  High Intensity therapy:   2.5-4.0        Imaging Results (last 24 hours)     ** No results found for the last 24 hours. **        Hospital Medications (active)       Dose Frequency Start End    ALPRAZolam (XANAX) tablet 0.5 mg 0.5 mg Nightly PRN 1/5/2018     Sig - Route: Take 1 tablet by mouth At Night As Needed for Anxiety. - Oral    amoxicillin-clavulanate (AUGMENTIN) 500-125 MG per tablet 500 mg 1 tablet Every 12 Hours Scheduled 1/5/2018 1/10/2018    Sig - Route: Take 1 tablet by mouth Every 12 (Twelve) Hours. - Oral    aspirin EC tablet 81 mg 81 mg Daily 1/6/2018     Sig - Route: Take 1 tablet by mouth Daily. - Oral    atorvastatin (LIPITOR) tablet 40 mg 40 mg Nightly 1/5/2018     Sig - Route: Take 1 tablet by mouth Every Night. - Oral    castor oil-balsam peru (VENELEX) ointment  Every 12 Hours Scheduled 1/5/2018     Sig - Route: Apply  topically Every 12 (Twelve) Hours. - Topical    citalopram (CeleXA) tablet 20 mg 20 mg Daily 1/6/2018     Sig - Route: Take 1 tablet by mouth Daily. - Oral    furosemide (LASIX) tablet 40 mg 40 mg Daily 1/6/2018     Sig - Route: Take 1 tablet by mouth Daily. - Oral    gabapentin (NEURONTIN) capsule 400 mg 400 mg Nightly 1/5/2018     Sig - Route: Take 1 capsule by mouth Every Night. - Oral    hydrALAZINE (APRESOLINE) tablet 25 mg 25 mg Every 12 Hours Scheduled 1/5/2018     Sig - Route: Take 1 tablet by mouth Every 12 (Twelve) Hours. - Oral    HYDROcodone-acetaminophen (NORCO) 7.5-325 MG per tablet 1 tablet 1 tablet 2 Times Daily PRN 1/5/2018     Sig - Route: Take 1 tablet by mouth 2 (Two) Times a  Day As Needed for Moderate Pain . - Oral    ipratropium-albuterol (DUO-NEB) nebulizer solution 3 mL 3 mL 4 Times Daily - RT 1/5/2018     Sig - Route: Take 3 mL by nebulization 4 (Four) Times a Day. - Nebulization    isosorbide mononitrate (IMDUR) 24 hr tablet 60 mg 60 mg Daily 1/6/2018     Sig - Route: Take 1 tablet by mouth Daily. - Oral    metoprolol succinate XL (TOPROL-XL) 24 hr tablet 100 mg 100 mg Every 24 Hours Scheduled 1/6/2018     Sig - Route: Take 2 tablets by mouth Daily. - Oral    pantoprazole (PROTONIX) EC tablet 40 mg 40 mg Daily 1/6/2018     Sig - Route: Take 1 tablet by mouth Daily. - Oral    Pharmacy to dose warfarin  Continuous PRN 1/5/2018     Sig - Route: Continuous As Needed for Consult. - Does not apply    phenol (CHLORASEPTIC) 1.4 % liquid 1 spray 1 spray Every 4 Hours PRN 1/5/2018     Sig - Route: Apply 1 spray to the mouth or throat Every 4 (Four) Hours As Needed for Sore Throat. - Mouth/Throat    spironolactone (ALDACTONE) tablet 25 mg 25 mg Daily 1/6/2018     Sig - Route: Take 1 tablet by mouth Daily. - Oral    tuberculin injection 5 Units 5 Units Once 1/5/2018 1/5/2018    Sig - Route: Inject 0.1 mL into the skin 1 (One) Time. - Intradermal    Cosign for Ordering: Required by Myles Barone MD    tuberculin injection 5 Units 5 Units Once 1/19/2018     Sig - Route: Inject 0.1 mL into the skin 1 (One) Time. - Intradermal    Cosign for Ordering: Required by Myles Barone MD    vitamin d (CHOLECALIFEROL) capsule 5,000 Units 5,000 Units Daily 1/6/2018     Sig - Route: Take 1 capsule by mouth Daily. - Oral    ALPRAZolam (XANAX) tablet 0.5 mg (Discontinued) 0.5 mg Nightly PRN 12/26/2017 1/5/2018    Sig - Route: Take 1 tablet by mouth At Night As Needed for Anxiety. - Oral    Reason for Discontinue: Patient Discharge    amoxicillin-clavulanate (AUGMENTIN) 500-125 MG per tablet 500 mg (Discontinued) 1 tablet Every 12 Hours Scheduled 1/5/2018 1/5/2018    Sig - Route: Take 1 tablet by  mouth Every 12 (Twelve) Hours. - Oral    Reason for Discontinue: Patient Discharge    aspirin EC tablet 81 mg (Discontinued) 81 mg Daily 12/28/2017 1/5/2018    Sig - Route: Take 1 tablet by mouth Daily. - Oral    Reason for Discontinue: Patient Discharge    atorvastatin (LIPITOR) tablet 40 mg (Discontinued) 40 mg Nightly 12/28/2017 1/5/2018    Sig - Route: Take 1 tablet by mouth Every Night. - Oral    Reason for Discontinue: Patient Discharge    citalopram (CeleXA) tablet 20 mg (Discontinued) 20 mg Daily 12/27/2017 1/5/2018    Sig - Route: Take 1 tablet by mouth Daily. - Oral    Reason for Discontinue: Patient Discharge    furosemide (LASIX) tablet 40 mg (Discontinued) 40 mg Daily 1/5/2018 1/5/2018    Sig - Route: Take 1 tablet by mouth Daily. - Oral    Reason for Discontinue: Patient Discharge    gabapentin (NEURONTIN) capsule 400 mg (Discontinued) 400 mg Nightly 12/26/2017 1/5/2018    Sig - Route: Take 1 capsule by mouth Every Night. - Oral    Reason for Discontinue: Patient Discharge    hydrALAZINE (APRESOLINE) tablet 25 mg (Discontinued) 25 mg Every 12 Hours Scheduled 12/27/2017 1/5/2018    Sig - Route: Take 1 tablet by mouth Every 12 (Twelve) Hours. - Oral    Reason for Discontinue: Patient Discharge    HYDROcodone-acetaminophen (NORCO) 7.5-325 MG per tablet 1 tablet (Discontinued) 1 tablet 2 Times Daily PRN 12/26/2017 1/5/2018    Sig - Route: Take 1 tablet by mouth 2 (Two) Times a Day As Needed for Moderate Pain . - Oral    Reason for Discontinue: Patient Discharge    ipratropium-albuterol (DUO-NEB) nebulizer solution 3 mL (Discontinued) 3 mL 4 Times Daily - RT 1/1/2018 1/5/2018    Sig - Route: Take 3 mL by nebulization 4 (Four) Times a Day. - Nebulization    Reason for Discontinue: Patient Discharge    isosorbide mononitrate (IMDUR) 24 hr tablet 60 mg (Discontinued) 60 mg Daily 12/27/2017 1/5/2018    Sig - Route: Take 1 tablet by mouth Daily. - Oral    Reason for Discontinue: Patient Discharge    metoprolol  succinate XL (TOPROL-XL) 24 hr tablet 100 mg (Discontinued) 100 mg Every 24 Hours Scheduled 12/30/2017 1/5/2018    Sig - Route: Take 2 tablets by mouth Daily. - Oral    Reason for Discontinue: Patient Discharge    pantoprazole (PROTONIX) EC tablet 40 mg (Discontinued) 40 mg Daily 12/27/2017 1/5/2018    Sig - Route: Take 1 tablet by mouth Daily. - Oral    Reason for Discontinue: Patient Discharge    Pharmacy to dose warfarin (Discontinued)  Continuous PRN 1/4/2018 1/5/2018    Sig - Route: Continuous As Needed for Consult. - Does not apply    Reason for Discontinue: Patient Discharge    phenol (CHLORASEPTIC) 1.4 % liquid 1 spray (Discontinued) 1 spray Every 4 Hours PRN 12/29/2017 1/5/2018    Sig - Route: Apply 1 spray to the mouth or throat Every 4 (Four) Hours As Needed for Sore Throat. - Mouth/Throat    Reason for Discontinue: Patient Discharge    piperacillin-tazobactam (ZOSYN) 2.25 g/100 mL 0.9% NS IVPB (mbp) (Discontinued) 2.25 g Every 6 Hours 1/3/2018 1/5/2018    Sig - Route: Infuse 100 mL into a venous catheter Every 6 (Six) Hours. - Intravenous    spironolactone (ALDACTONE) tablet 25 mg (Discontinued) 25 mg Daily 12/29/2017 1/5/2018    Sig - Route: Take 1 tablet by mouth Daily. - Oral    Reason for Discontinue: Patient Discharge    vitamin d (CHOLECALIFEROL) capsule 5,000 Units (Discontinued) 5,000 Units Daily 12/27/2017 1/5/2018    Sig - Route: Take 1 capsule by mouth Daily. - Oral    Reason for Discontinue: Patient Discharge         Results Review:     I reviewed the patient's new clinical results.    Medications Reviewed    Assessment/Plan   1.  Respiratory failure   Hematology is following and feels like this is most likely related to fluid overload from heart failure and renal failure.  She will continue diuresis as tolerated per nephrology   Continue BiPAP as needed  2.  Acute systolic congestive heart failure   Continue diuresis per nephrology, her creatinine had slightly increased yesterday and her  diuretics were adjusted accordingly   Creatinine slightly better today  3.  DIANA on CKD 4   Creatinine today 2.17 slightly improved from yesterday's 2.29   Nephrology following  4.  Hypertension stable  5.  Pulmonary hypertension stable  6.  Healthcare associated pneumonia   Pulmonology changed to by mouth Augmentin yesterday  7.  Gray zone troponin   We'll repeat troponin in the a.m.  8.  Hyperanticoagulated   Continue to hold Coumadin            Active Problems:    CHF (congestive heart failure)              Joe Watt MD  01/06/18  7:29 AM

## 2018-01-06 NOTE — THERAPY TREATMENT NOTE
Acute Care - Physical Therapy Treatment Note   [place]     Patient Name: Tre Kinney  : 1932  MRN: [id number]  Today's Date: 2018  Onset of Illness/Injury or Date of Surgery Date: 18 (swing bed admit date)  Date of Referral to PT: 18       Admit Date: 2018    Visit Dx:  No diagnosis found.  Patient Active Problem List   Diagnosis   • Acid reflux   • Ankle arthralgia   • Cardiac conduction disorder   • Closed fracture of distal fibula   • Arteriosclerosis of coronary artery   • Fracture of distal end of tibia   • Elevated cholesterol   • BP (high blood pressure)   • Hypertrophic polyarthritis   • OP (osteoporosis)   • Right heart failure   • Heart failure, chronic, right-sided   • SOB (shortness of breath)   • Palpitations   • Acute CHF   • CHF (congestive heart failure)   • Congestive heart failure   • Precordial pain               Adult Rehabilitation Note       18 1118 18 1111 18 1558    Rehab Assessment/Intervention    Discipline physical therapist  -BC physical therapy assistant  -RF physical therapy assistant  -RF    Document Type therapy note (daily note)  -BC therapy note (daily note)  -RF therapy note (daily note)  -RF    Subjective Information agree to therapy;complains of;weakness  -BC agree to therapy  -RF agree to therapy  -RF    Patient Effort, Rehab Treatment poor  -BC good  -RF good  -RF    Symptoms Noted During/After Treatment fatigue  -BC fatigue  -RF fatigue;shortness of breath  -RF    Precautions/Limitations fall precautions;oxygen therapy device and L/min  -BC fall precautions;oxygen therapy device and L/min  -RF fall precautions;oxygen therapy device and L/min  -RF    Patient Response to Treatment  Patient tolerant to treatment session with good activity tolerance noted. Pt demonstrates improving functional mobility with transferring, but continues to require assistance and cuing due to LE weakness.  -RF Patient demonstrated improved tolerance to  treatmwnt with improved funtional mobility noted. Pt demonstrated some SOB, but was able to recover with PLB.   -RF    Recorded by [BC] Vandana Pike, PT [RF] Kelli Cain, PTA [RF] Kelli Cain PTA    Pain Assessment    Pain Assessment No/denies pain  -BC No/denies pain  -RF No/denies pain  -RF    Recorded by [BC] Vandana Pike, PT [RF] Kelli Cain, NACHO [RF] Kelli Cain PTA    Cognitive Assessment/Intervention    Current Cognitive/Communication Assessment functional  -BC functional  -RF functional  -RF    Orientation Status oriented to;person;place;situation  -BC oriented to;person;place;situation  -RF oriented to;person;place;situation  -RF    Follows Commands/Answers Questions able to follow single-step instructions;100% of the time  -BC able to follow single-step instructions;100% of the time;needs cueing  -RF able to follow single-step instructions;100% of the time;needs cueing;needs repetition  -RF    Personal Safety decreased awareness, need for safety;decreased insight to deficits  -BC mild impairment  -RF mild impairment  -RF    Personal Safety Interventions fall prevention program maintained;gait belt;muscle strengthening facilitated;nonskid shoes/slippers when out of bed  -BC fall prevention program maintained;muscle strengthening facilitated;gait belt;nonskid shoes/slippers when out of bed  -RF fall prevention program maintained;muscle strengthening facilitated;gait belt;nonskid shoes/slippers when out of bed  -RF    Recorded by [BC] Vandana Pike, PT [RF] Kelli Cain, NACHO [RF] Kelli Cain PTA    Bed Mobility, Assessment/Treatment    Bed Mobility, Assistive Device bed rails  -BC bed rails  -RF bed rails  -RF    Bed Mobility, [place]/Bridge, [place] moderate assist (50% patient effort);maximum assist (25% patient effort);2 person assist required;verbal cues required;nonverbal cues required (demo/gesture)  -BC      Bed Mob, Supine to Sit, [place] moderate  assist (50% patient effort);maximum assist (25% patient effort);2 person assist required;verbal cues required;nonverbal cues required (demo/gesture)  -BC moderate assist (50% patient effort);2 person assist required  -RF moderate assist (50% patient effort);maximum assist (25% patient effort);2 person assist required  -RF    Bed Mob, Sit to Supine, [place] moderate assist (50% patient effort);maximum assist (25% patient effort);2 person assist required;verbal cues required;nonverbal cues required (demo/gesture)  -BC moderate assist (50% patient effort);2 person assist required  -RF moderate assist (50% patient effort);maximum assist (25% patient effort);2 person assist required  -RF    Bed Mobility, Impairments strength decreased  -BC strength decreased;impaired balance  -RF strength decreased;impaired balance  -RF    Recorded by [BC] Vandana Pike, PT [RF] Kelli Cain, PTA [RF] Kelli Cain, PTA    Transfer Assessment/Treatment    Transfers, Bed-Chair [place] maximum assist (25% patient effort);verbal cues required;nonverbal cues required (demo/gesture)  -BC moderate assist (50% patient effort);verbal cues required;nonverbal cues required (demo/gesture)  -RF maximum assist (25% patient effort);moderate assist (50% patient effort);verbal cues required;nonverbal cues required (demo/gesture)  -RF    Transfers, Bed-Chair-Bed, Assist Device rolling walker  -BC      Transfers, Sit-Stand [place] maximum assist (25% patient effort);2 person assist required;verbal cues required;nonverbal cues required (demo/gesture)  -BC 2 person assist required;moderate assist (50% patient effort);nonverbal cues required (demo/gesture);verbal cues required  -RF maximum assist (25% patient effort);2 person assist required;moderate assist (50% patient effort);nonverbal cues required (demo/gesture);verbal cues required  -RF    Transfers, Stand-Sit [place] maximum assist (25% patient effort);2 person assist  required;verbal cues required;nonverbal cues required (demo/gesture)  -BC 2 person assist required;moderate assist (50% patient effort);nonverbal cues required (demo/gesture);verbal cues required  -RF maximum assist (25% patient effort);2 person assist required;moderate assist (50% patient effort);nonverbal cues required (demo/gesture);verbal cues required  -RF    Transfers, Sit-Stand-Sit, Assist Device rolling walker  -BC rolling walker  -RF rolling walker  -RF    Transfer, Safety Issues sequencing ability decreased;balance decreased during turns  -BC      Transfer, Impairments strength decreased;impaired balance;coordination impaired  -BC strength decreased;impaired balance  -RF strength decreased;impaired balance  -RF    Transfer, Comment  Requires cues for technique and LE advancement  -RF     Recorded by [BC] Vandana Pike, PT [RF] Kelli Cain, PTA [RF] Kelli Cain PTA    Gait Assessment/Treatment    Gait, [place] Level moderate assist (50% patient effort);maximum assist (25% patient effort);2 person assist required  -BC moderate assist (50% patient effort);maximum assist (25% patient effort);nonverbal cues required (demo/gesture);verbal cues required  -RF moderate assist (50% patient effort);maximum assist (25% patient effort);nonverbal cues required (demo/gesture);verbal cues required  -RF    Gait, Assistive Device rolling walker  -BC rolling walker  -RF rolling walker  -RF    Gait, Distance (Feet) 12  -BC 2  -RF 2  -RF    Gait, Safety Issues balance decreased during turns;sequencing ability decreased;supplemental O2;step length decreased  -BC      Gait, Impairments  strength decreased;impaired balance  -RF strength decreased;impaired balance  -RF    Gait, Comment  Short distance from bed to chair.   -RF Pt able to transfer from bed to chair with few steps. Cues for technique and LE advancement required.   -RF    Recorded by [BC] Vandana Pike, PT [RF] Kelli Cain, NACHO [RF] Kelli  HILDA Cain PTA    Therapy Exercises    Bilateral Lower Extremities AROM:;10 reps;sitting  -BC AROM:;10 reps;sitting  -RF AROM:;10 reps;sitting  -RF    Recorded by [BC] Vandana Pike, PT [RF] Kelli Cain, PTA [RF] Kelli Cain PTA    Positioning and Restraints    Pre-Treatment Position in bed  -BC in bed  -RF in bed  -RF    Post Treatment Position chair  -BC chair  -RF chair  -RF    In Bed notified nsg;supine;call light within reach;encouraged to call for assist;side rails up x3  -BC      In Chair  sitting;call light within reach;encouraged to call for assist  -RF sitting;call light within reach;encouraged to call for assist  -RF    Recorded by [BC] Vandana Pike, PT [RF] Kelli Cain, NACHO [RF] Kelli Cain PTA      User Key  (r) = Recorded By, (t) = Taken By, (c) = Cosigned By    Initials Name Effective Dates    BC Vandana Pike, PT 03/14/16 -     RF Kelli Cain, PTA 07/19/17 -                 IP PT Goals       01/05/18 1712 01/02/18 1727       Bed Mobility PT LTG    Bed Mobility PT LTG, Date Established 01/05/18  -CT 01/02/18  -BC     Bed Mobility PT LTG, Time to Achieve by discharge  -CT by discharge  -BC     Bed Mobility PT LTG, Activity Type all bed mobility  -CT all bed mobility  -BC     Bed Mobility PT LTG, [place] Level minimum assist (75% patient effort);moderate assist (50% patient effort)  -CT minimum assist (75% patient effort)  -BC     Bed Mobility PT Goal  LTG, Assist Device bed rails  -CT bed rails  -BC     Transfer Training PT LTG    Transfer Training PT LTG, Date Established 01/05/18  -CT 01/02/18  -BC     Transfer Training PT LTG, Time to Achieve by discharge  -CT by discharge  -BC     Transfer Training PT LTG, Activity Type bed to chair /chair to bed;sit to stand/stand to sit  -CT all transfers  -BC     Transfer Training PT LTG, [place] moderate assist (50% patient effort)  -CT minimum assist (75% patient effort)  -BC     Transfer Training PT LTG,  Assist Device other (see comments)   with appropriate AD  -CT walker, rolling  -BC     Gait Training PT LTG    Gait Training Goal PT LTG, Date Established 01/05/18  -CT 01/02/18  -BC     Gait Training Goal PT LTG, Time to Achieve by discharge  -CT by discharge  -BC     Gait Training Goal PT LTG, [place] Level moderate assist (50% patient effort);2 person assist required  -CT minimum assist (75% patient effort);moderate assist (50% patient effort)  -BC     Gait Training Goal PT LTG, Assist Device other (see comments)   with appropriate AD  -CT walker, rolling  -BC     Gait Training Goal PT LTG, Distance to Achieve 25  -CT 20  -BC       User Key  (r) = Recorded By, (t) = Taken By, (c) = Cosigned By    Initials Name Provider Type    BC Vandana Pike, PT Physical Therapist    CT Bhargavi Vásquez, PT Physical Therapist          Physical Therapy Education     Title: PT OT SLP Therapies (Done)     Topic: Physical Therapy (Done)     Point: Mobility training (Done)    Learning Progress Summary    Learner Readiness Method Response Comment Documented by Status   Patient Acceptance E VU  BC 01/06/18 1122 Done    Acceptance E VU  CT 01/05/18 1713 Done               Point: Home exercise program (Done)    Learning Progress Summary    Learner Readiness Method Response Comment Documented by Status   Patient Acceptance E VU  BC 01/06/18 1122 Done    Acceptance E VU  CT 01/05/18 1713 Done               Point: Body mechanics (Done)    Learning Progress Summary    Learner Readiness Method Response Comment Documented by Status   Patient Acceptance E VU  BC 01/06/18 1122 Done    Acceptance E VU  CT 01/05/18 1713 Done               Point: Precautions (Done)    Learning Progress Summary    Learner Readiness Method Response Comment Documented by Status   Patient Acceptance E VU  BC 01/06/18 1122 Done    Acceptance E VU  CT 01/05/18 1713 Done                      User Key     Initials Effective Dates Name Provider Type Discipline    BC  03/14/16 -  Vandana Pike, PT Physical Therapist PT    CT 03/14/16 -  Bhargavi Vásquez, PT Physical Therapist PT                    PT Recommendation and Plan  Anticipated Equipment Needs At Discharge: front wheeled walker, bedside commode  Anticipated Discharge Disposition: home with 24/7 care, skilled nursing facility  Planned Therapy Interventions: balance training, bed mobility training, gait training, home exercise program, manual therapy techniques, neuromuscular re-education, patient/family education, strengthening, transfer training  PT Frequency: 5 times/wk, per priority policy             Outcome Measures       01/06/18 1100 01/05/18 1700 01/05/18 1505    How much help from another person do you currently need...    Turning from your back to your side while in flat bed without using bedrails? 2  -BC 2  -CT     Moving from lying on back to sitting on the side of a flat bed without bedrails? 2  -BC 2  -CT     Moving to and from a bed to a chair (including a wheelchair)? 2  -BC 2  -CT     Standing up from a chair using your arms (e.g., wheelchair, bedside chair)? 2  -BC 2  -CT     Climbing 3-5 steps with a railing? 1  -BC 1  -CT     To walk in hospital room? 1  -BC 1  -CT     AM-PAC 6 Clicks Score 10  -BC 10  -CT     How much help from another is currently needed...    Putting on and taking off regular lower body clothing?   2  -KR    Bathing (including washing, rinsing, and drying)   2  -KR    Toileting (which includes using toilet bed pan or urinal)   2  -KR    Putting on and taking off regular upper body clothing   2  -KR    Taking care of personal grooming (such as brushing teeth)   3  -KR    Eating meals   3  -KR    Score   14  -KR    Functional Assessment    Outcome Measure Options AM-PAC 6 Clicks Basic Mobility (PT)  -BC AM-PAC 6 Clicks Basic Mobility (PT)  -CT       01/05/18 1500 01/04/18 1100 01/03/18 1600    How much help from another person do you currently need...    Turning from your back to  your side while in flat bed without using bedrails?  2  -RF 2  -RF    Moving from lying on back to sitting on the side of a flat bed without bedrails?  2  -RF 2  -RF    Moving to and from a bed to a chair (including a wheelchair)?  1  -RF 1  -RF    Standing up from a chair using your arms (e.g., wheelchair, bedside chair)?  1  -RF 1  -RF    Climbing 3-5 steps with a railing?  1  -RF 1  -RF    To walk in hospital room?  1  -RF 1  -RF    AM-PAC 6 Clicks Score  8  -RF 8  -RF    Functional Assessment    Outcome Measure Options AM-PAC 6 Clicks Daily Activity (OT)  -KR AM-PAC 6 Clicks Basic Mobility (PT)  -RF AM-PAC 6 Clicks Basic Mobility (PT)  -RF      User Key  (r) = Recorded By, (t) = Taken By, (c) = Cosigned By    Initials Name Provider Type    BC Vandana Pike, PT Physical Therapist    KR Andres Uriarte, OT Occupational Therapist    ANTIONE Vásquez, PT Physical Therapist    RF Kelli Cain, PTA Physical Therapy Assistant           Time Calculation:         PT Charges       01/06/18 1123          Time Calculation    Start Time --   30  -BC      PT Received On 01/06/18  -BC        User Key  (r) = Recorded By, (t) = Taken By, (c) = Cosigned By    Initials Name Provider Type    WHITNEY Pike, PT Physical Therapist          Therapy Charges for Today     Code Description Service Date Service Provider Modifiers Qty    17699487994 HC GAIT TRAINING EA 15 MIN 1/6/2018 Vandana Pike, PT GP 1    98925030404 HC PT THERAPEUTIC ACT EA 15 MIN 1/6/2018 Vandana Pike, PT GP 1    [id number]  PT THER SUPP EA 15 MIN 1/6/2018 Vandana Pike, PT GP 2          PT G-Codes  Outcome Measure Options: AM-PAC 6 Clicks Basic Mobility (PT)  Score: 10  Functional Limitation: Mobility: Walking and moving around  Mobility: Walking and Moving Around Current Status (): At least 60 percent but less than 80 percent impaired, limited or restricted  Mobility: Walking and Moving Around Goal Status (): At least 40 percent  but less than 60 percent impaired, limited or restricted    Vandana Pike, PT  1/6/2018

## 2018-01-07 LAB
ANION GAP SERPL CALCULATED.3IONS-SCNC: 9.9 MMOL/L (ref 3.6–11.2)
BASOPHILS # BLD AUTO: 0.03 10*3/MM3 (ref 0–0.3)
BASOPHILS NFR BLD AUTO: 0.7 % (ref 0–2)
BUN BLD-MCNC: 39 MG/DL (ref 7–21)
BUN/CREAT SERPL: 19.1 (ref 7–25)
CALCIUM SPEC-SCNC: 8.4 MG/DL (ref 7.7–10)
CHLORIDE SERPL-SCNC: 100 MMOL/L (ref 99–112)
CO2 SERPL-SCNC: 30.1 MMOL/L (ref 24.3–31.9)
CREAT BLD-MCNC: 2.04 MG/DL (ref 0.43–1.29)
CRP SERPL-MCNC: 7.27 MG/DL (ref 0–0.99)
DEPRECATED RDW RBC AUTO: 50 FL (ref 37–54)
EOSINOPHIL # BLD AUTO: 0.06 10*3/MM3 (ref 0–0.7)
EOSINOPHIL NFR BLD AUTO: 1.4 % (ref 0–7)
ERYTHROCYTE [DISTWIDTH] IN BLOOD BY AUTOMATED COUNT: 16.6 % (ref 11.5–14.5)
GFR SERPL CREATININE-BSD FRML MDRD: 23 ML/MIN/1.73
GLUCOSE BLD-MCNC: 84 MG/DL (ref 70–110)
HCT VFR BLD AUTO: 28.6 % (ref 37–47)
HGB BLD-MCNC: 8.9 G/DL (ref 12–16)
IMM GRANULOCYTES # BLD: 0.01 10*3/MM3 (ref 0–0.03)
IMM GRANULOCYTES NFR BLD: 0.2 % (ref 0–0.5)
INR PPP: 5.14 (ref 0.9–1.1)
LYMPHOCYTES # BLD AUTO: 0.75 10*3/MM3 (ref 1–3)
LYMPHOCYTES NFR BLD AUTO: 17.3 % (ref 16–46)
MCH RBC QN AUTO: 25.5 PG (ref 27–33)
MCHC RBC AUTO-ENTMCNC: 31.1 G/DL (ref 33–37)
MCV RBC AUTO: 81.9 FL (ref 80–94)
MONOCYTES # BLD AUTO: 0.44 10*3/MM3 (ref 0.1–0.9)
MONOCYTES NFR BLD AUTO: 10.2 % (ref 0–12)
NEUTROPHILS # BLD AUTO: 3.04 10*3/MM3 (ref 1.4–6.5)
NEUTROPHILS NFR BLD AUTO: 70.2 % (ref 40–75)
NRBC BLD MANUAL-RTO: 0 /100 WBC (ref 0–0)
OSMOLALITY SERPL CALC.SUM OF ELEC: 288 MOSM/KG (ref 273–305)
PLATELET # BLD AUTO: 134 10*3/MM3 (ref 130–400)
PMV BLD AUTO: 10.2 FL (ref 6–10)
POTASSIUM BLD-SCNC: 3.3 MMOL/L (ref 3.5–5.3)
PROTHROMBIN TIME: 48.2 SECONDS (ref 11–15.4)
RBC # BLD AUTO: 3.49 10*6/MM3 (ref 4.2–5.4)
SODIUM BLD-SCNC: 140 MMOL/L (ref 135–153)
TROPONIN I SERPL-MCNC: 0.1 NG/ML
WBC NRBC COR # BLD: 4.33 10*3/MM3 (ref 4.5–12.5)

## 2018-01-07 PROCEDURE — 94799 UNLISTED PULMONARY SVC/PX: CPT

## 2018-01-07 PROCEDURE — 85610 PROTHROMBIN TIME: CPT | Performed by: FAMILY MEDICINE

## 2018-01-07 PROCEDURE — 94660 CPAP INITIATION&MGMT: CPT

## 2018-01-07 PROCEDURE — 80048 BASIC METABOLIC PNL TOTAL CA: CPT | Performed by: FAMILY MEDICINE

## 2018-01-07 PROCEDURE — 86140 C-REACTIVE PROTEIN: CPT | Performed by: FAMILY MEDICINE

## 2018-01-07 PROCEDURE — 85025 COMPLETE CBC W/AUTO DIFF WBC: CPT | Performed by: FAMILY MEDICINE

## 2018-01-07 PROCEDURE — 84484 ASSAY OF TROPONIN QUANT: CPT | Performed by: FAMILY MEDICINE

## 2018-01-07 RX ORDER — POTASSIUM CHLORIDE 20 MEQ/1
40 TABLET, EXTENDED RELEASE ORAL ONCE
Status: COMPLETED | OUTPATIENT
Start: 2018-01-07 | End: 2018-01-07

## 2018-01-07 RX ADMIN — CHOLECALCIFEROL CAP 125 MCG (5000 UNIT) 5000 UNITS: 125 CAP at 08:13

## 2018-01-07 RX ADMIN — GABAPENTIN 400 MG: 400 CAPSULE ORAL at 20:58

## 2018-01-07 RX ADMIN — IPRATROPIUM BROMIDE AND ALBUTEROL SULFATE 3 ML: .5; 3 SOLUTION RESPIRATORY (INHALATION) at 07:38

## 2018-01-07 RX ADMIN — IPRATROPIUM BROMIDE AND ALBUTEROL SULFATE 3 ML: .5; 3 SOLUTION RESPIRATORY (INHALATION) at 13:37

## 2018-01-07 RX ADMIN — METOPROLOL SUCCINATE 100 MG: 50 TABLET, FILM COATED, EXTENDED RELEASE ORAL at 08:12

## 2018-01-07 RX ADMIN — SPIRONOLACTONE 25 MG: 25 TABLET ORAL at 08:13

## 2018-01-07 RX ADMIN — FUROSEMIDE 40 MG: 40 TABLET ORAL at 08:13

## 2018-01-07 RX ADMIN — HYDROCODONE BITARTRATE AND ACETAMINOPHEN 1 TABLET: 7.5; 325 TABLET ORAL at 20:59

## 2018-01-07 RX ADMIN — POTASSIUM CHLORIDE 40 MEQ: 1500 TABLET, EXTENDED RELEASE ORAL at 09:39

## 2018-01-07 RX ADMIN — CASTOR OIL AND BALSAM, PERU: 788; 87 OINTMENT TOPICAL at 21:00

## 2018-01-07 RX ADMIN — PANTOPRAZOLE SODIUM 40 MG: 40 TABLET, DELAYED RELEASE ORAL at 08:13

## 2018-01-07 RX ADMIN — ISOSORBIDE MONONITRATE 60 MG: 60 TABLET, EXTENDED RELEASE ORAL at 08:13

## 2018-01-07 RX ADMIN — AMOXICILLIN AND CLAVULANATE POTASSIUM 500 MG: 500; 125 TABLET, FILM COATED ORAL at 08:12

## 2018-01-07 RX ADMIN — HYDRALAZINE HYDROCHLORIDE 25 MG: 25 TABLET ORAL at 08:13

## 2018-01-07 RX ADMIN — HYDROCODONE BITARTRATE AND ACETAMINOPHEN 1 TABLET: 7.5; 325 TABLET ORAL at 11:36

## 2018-01-07 RX ADMIN — AMOXICILLIN AND CLAVULANATE POTASSIUM 500 MG: 500; 125 TABLET, FILM COATED ORAL at 21:35

## 2018-01-07 RX ADMIN — ASPIRIN 81 MG: 81 TABLET ORAL at 08:13

## 2018-01-07 RX ADMIN — IPRATROPIUM BROMIDE AND ALBUTEROL SULFATE 3 ML: .5; 3 SOLUTION RESPIRATORY (INHALATION) at 01:28

## 2018-01-07 RX ADMIN — HYDRALAZINE HYDROCHLORIDE 25 MG: 25 TABLET ORAL at 21:35

## 2018-01-07 RX ADMIN — IPRATROPIUM BROMIDE AND ALBUTEROL SULFATE 3 ML: .5; 3 SOLUTION RESPIRATORY (INHALATION) at 20:13

## 2018-01-07 RX ADMIN — CITALOPRAM HYDROBROMIDE 20 MG: 20 TABLET ORAL at 08:13

## 2018-01-07 RX ADMIN — CASTOR OIL AND BALSAM, PERU: 788; 87 OINTMENT TOPICAL at 08:13

## 2018-01-07 NOTE — PROGRESS NOTES
89      Tre Kinney     LOS: 2 days   Patient Care Team:  Myles Barone MD as PCP - General  Myles Barone MD as PCP - Family Medicine  Myles Barone MD as PCP - Claims Attributed  Kelli Márquez RN as Care Coordinator (Population Health)      Subjective     Interval History:     Patient Complaints: Patient refusing to wear BiPAP, denies any chest pain, no change in shortness of breath  Patient Denies:    History taken from: patient chart    Review of Systems:    All systems were reviewed and negative except for:    Objective     Vital Signs  Temp:  [97.8 °F (36.6 °C)-97.9 °F (36.6 °C)] 97.8 °F (36.6 °C)  Heart Rate:  [72-84] 72  Resp:  [20-22] 20  BP: (127-148)/(63-71) 128/68  Lab Results (last 24 hours)     Procedure Component Value Units Date/Time    CBC Auto Differential [[id number]]  (Abnormal) Collected:  01/07/18 0508    Specimen:  Blood Updated:  01/07/18 0539     WBC 4.33 (L) 10*3/mm3      RBC 3.49 (L) 10*6/mm3      Hemoglobin 8.9 (L) g/dL      Hematocrit 28.6 (L) %      MCV 81.9 fL      MCH 25.5 (L) pg      MCHC 31.1 (L) g/dL      RDW 16.6 (H) %      RDW-SD 50.0 fl      MPV 10.2 (H) fL      Platelets 134 10*3/mm3      Neutrophil % 70.2 %      Lymphocyte % 17.3 %      Monocyte % 10.2 %      Eosinophil % 1.4 %      Basophil % 0.7 %      Immature Grans % 0.2 %      Neutrophils, Absolute 3.04 10*3/mm3      Lymphocytes, Absolute 0.75 (L) 10*3/mm3      Monocytes, Absolute 0.44 10*3/mm3      Eosinophils, Absolute 0.06 10*3/mm3      Basophils, Absolute 0.03 10*3/mm3      Immature Grans, Absolute 0.01 10*3/mm3      nRBC 0.0 /100 WBC     CBC & Differential [[id number]] Collected:  01/07/18 0508    Specimen:  Blood Updated:  01/07/18 0539    Narrative:       The following orders were created for panel order CBC & Differential.  Procedure                               Abnormality         Status                     ---------                               -----------         ------                      Scan Slide[[id number]]                                                                  CBC Auto Differential[[id number]]        Abnormal            Final result                 Please view results for these tests on the individual orders.    Protime-INR [[id number]]  (Abnormal) Collected:  01/07/18 0508    Specimen:  Blood Updated:  01/07/18 0551     Protime 48.2 (C) Seconds      INR 5.14 (C)    Narrative:       Suggested INR therapeutic range for stable oral anticoagulant therapy:    Low Intensity therapy:   1.5-2.0  Moderate Intensity therapy:   2.0-3.0  High Intensity therapy:   2.5-4.0    Basic Metabolic Panel [[id number]]  (Abnormal) Collected:  01/07/18 0508    Specimen:  Blood Updated:  01/07/18 0559     Glucose 84 mg/dL      BUN 39 (H) mg/dL      Creatinine 2.04 (H) mg/dL      Sodium 140 mmol/L      Potassium 3.3 (L) mmol/L      Chloride 100 mmol/L      CO2 30.1 mmol/L      Calcium 8.4 mg/dL      eGFR Non African Amer 23 (L) mL/min/1.73      BUN/Creatinine Ratio 19.1     Anion Gap 9.9 mmol/L     Narrative:       The MDRD GFR formula is only valid for adults with stable renal function between ages 18 and 70.    Osmolality, Calculated [[id number]]  (Normal) Collected:  01/07/18 0508    Specimen:  Blood Updated:  01/07/18 0559     Osmolality Calc 288.0 mOsm/kg     C-reactive Protein [[id number]]  (Abnormal) Collected:  01/07/18 0508    Specimen:  Blood Updated:  01/07/18 0559     C-Reactive Protein 7.27 (H) mg/dL     Troponin [[id number]]  (Abnormal) Collected:  01/07/18 0508    Specimen:  Blood Updated:  01/07/18 0612     Troponin I 0.095 (H) ng/mL     Narrative:       Ultra Troponin I Reference Range:         <=0.039 ng/mL: Negative    0.04-0.779 ng/mL: Indeterminate Range. Suspicious of MI.  Clinical correlation required.       >=0.78  ng/mL: Consistent with myocardial injury.  Clinical correlation required.            Physical Exam:     General Appearance:    Alert, cooperative, in no acute distress   Head:     Normocephalic, without obvious abnormality, atraumatic   Eyes:            Lids and lashes normal, conjunctivae and sclerae normal, no   icterus, no pallor, corneas clear, PERRLA   Ears:    Ears appear intact with no abnormalities noted   Throat:   No oral lesions, no thrush, oral mucosa moist   Neck:   No adenopathy, supple, trachea midline, no thyromegaly, no     carotid bruit, no JVD   Back:     No kyphosis present, no scoliosis present, no skin lesions,       erythema or scars, no tenderness to percussion or                   palpation,   range of motion normal   Lungs:    Decreased breath sounds in the bases no rales or wheezes auscultated     Heart:    Regular rhythm and normal rate, normal S1 and S2, no            murmur, no gallop, no rub, no click   Breast Exam:    Deferred   Abdomen:     Normal bowel sounds, no masses, no organomegaly, soft        non-tender, non-distended, no guarding, no rebound                 tenderness   Genitalia:    Deferred   Extremities:   Moves all extremities well, 1+ edema, no cyanosis, no              redness   Pulses:   Pulses palpable and equal bilaterally   Skin:   No bleeding, bruising or rash   Lymph nodes:   No palpable adenopathy   Neurologic:   Cranial nerves 2 - 12 grossly intact, sensation intact, DTR        present and equal bilaterally     Lab Results (last 24 hours)     Procedure Component Value Units Date/Time    CBC Auto Differential [[id number]]  (Abnormal) Collected:  01/07/18 0508    Specimen:  Blood Updated:  01/07/18 0539     WBC 4.33 (L) 10*3/mm3      RBC 3.49 (L) 10*6/mm3      Hemoglobin 8.9 (L) g/dL      Hematocrit 28.6 (L) %      MCV 81.9 fL      MCH 25.5 (L) pg      MCHC 31.1 (L) g/dL      RDW 16.6 (H) %      RDW-SD 50.0 fl      MPV 10.2 (H) fL      Platelets 134 10*3/mm3      Neutrophil % 70.2 %      Lymphocyte % 17.3 %      Monocyte % 10.2 %      Eosinophil % 1.4 %      Basophil % 0.7 %      Immature Grans % 0.2 %      Neutrophils, Absolute 3.04  10*3/mm3      Lymphocytes, Absolute 0.75 (L) 10*3/mm3      Monocytes, Absolute 0.44 10*3/mm3      Eosinophils, Absolute 0.06 10*3/mm3      Basophils, Absolute 0.03 10*3/mm3      Immature Grans, Absolute 0.01 10*3/mm3      nRBC 0.0 /100 WBC     CBC & Differential [[id number]] Collected:  01/07/18 0508    Specimen:  Blood Updated:  01/07/18 0539    Narrative:       The following orders were created for panel order CBC & Differential.  Procedure                               Abnormality         Status                     ---------                               -----------         ------                     Scan Slide[[id number]]                                                                  CBC Auto Differential[[id number]]        Abnormal            Final result                 Please view results for these tests on the individual orders.    Protime-INR [[id number]]  (Abnormal) Collected:  01/07/18 0508    Specimen:  Blood Updated:  01/07/18 0551     Protime 48.2 (C) Seconds      INR 5.14 (C)    Narrative:       Suggested INR therapeutic range for stable oral anticoagulant therapy:    Low Intensity therapy:   1.5-2.0  Moderate Intensity therapy:   2.0-3.0  High Intensity therapy:   2.5-4.0    Basic Metabolic Panel [[id number]]  (Abnormal) Collected:  01/07/18 0508    Specimen:  Blood Updated:  01/07/18 0559     Glucose 84 mg/dL      BUN 39 (H) mg/dL      Creatinine 2.04 (H) mg/dL      Sodium 140 mmol/L      Potassium 3.3 (L) mmol/L      Chloride 100 mmol/L      CO2 30.1 mmol/L      Calcium 8.4 mg/dL      eGFR Non African Amer 23 (L) mL/min/1.73      BUN/Creatinine Ratio 19.1     Anion Gap 9.9 mmol/L     Narrative:       The MDRD GFR formula is only valid for adults with stable renal function between ages 18 and 70.    Osmolality, Calculated [[id number]]  (Normal) Collected:  01/07/18 0508    Specimen:  Blood Updated:  01/07/18 0559     Osmolality Calc 288.0 mOsm/kg     C-reactive Protein [[id number]]  (Abnormal) Collected:   01/07/18 0508    Specimen:  Blood Updated:  01/07/18 0559     C-Reactive Protein 7.27 (H) mg/dL     Troponin [[id number]]  (Abnormal) Collected:  01/07/18 0508    Specimen:  Blood Updated:  01/07/18 0612     Troponin I 0.095 (H) ng/mL     Narrative:       Ultra Troponin I Reference Range:         <=0.039 ng/mL: Negative    0.04-0.779 ng/mL: Indeterminate Range. Suspicious of MI.  Clinical correlation required.       >=0.78  ng/mL: Consistent with myocardial injury.  Clinical correlation required.        Imaging Results (last 24 hours)     ** No results found for the last 24 hours. **        Hospital Medications (active)       Dose Frequency Start End    ALPRAZolam (XANAX) tablet 0.5 mg 0.5 mg Nightly PRN 1/5/2018     Sig - Route: Take 1 tablet by mouth At Night As Needed for Anxiety. - Oral    amoxicillin-clavulanate (AUGMENTIN) 500-125 MG per tablet 500 mg 1 tablet Every 12 Hours Scheduled 1/5/2018 1/10/2018    Sig - Route: Take 1 tablet by mouth Every 12 (Twelve) Hours. - Oral    aspirin EC tablet 81 mg 81 mg Daily 1/6/2018     Sig - Route: Take 1 tablet by mouth Daily. - Oral    atorvastatin (LIPITOR) tablet 40 mg 40 mg Nightly 1/5/2018     Sig - Route: Take 1 tablet by mouth Every Night. - Oral    castor oil-balsam peru (VENELEX) ointment  Every 12 Hours Scheduled 1/5/2018     Sig - Route: Apply  topically Every 12 (Twelve) Hours. - Topical    citalopram (CeleXA) tablet 20 mg 20 mg Daily 1/6/2018     Sig - Route: Take 1 tablet by mouth Daily. - Oral    furosemide (LASIX) tablet 40 mg 40 mg Daily 1/6/2018     Sig - Route: Take 1 tablet by mouth Daily. - Oral    gabapentin (NEURONTIN) capsule 400 mg 400 mg Nightly 1/5/2018     Sig - Route: Take 1 capsule by mouth Every Night. - Oral    hydrALAZINE (APRESOLINE) tablet 25 mg 25 mg Every 12 Hours Scheduled 1/5/2018     Sig - Route: Take 1 tablet by mouth Every 12 (Twelve) Hours. - Oral    HYDROcodone-acetaminophen (NORCO) 7.5-325 MG per tablet 1 tablet 1 tablet 2  Times Daily PRN 1/5/2018     Sig - Route: Take 1 tablet by mouth 2 (Two) Times a Day As Needed for Moderate Pain . - Oral    ipratropium-albuterol (DUO-NEB) nebulizer solution 3 mL 3 mL 4 Times Daily - RT 1/5/2018     Sig - Route: Take 3 mL by nebulization 4 (Four) Times a Day. - Nebulization    isosorbide mononitrate (IMDUR) 24 hr tablet 60 mg 60 mg Daily 1/6/2018     Sig - Route: Take 1 tablet by mouth Daily. - Oral    metoprolol succinate XL (TOPROL-XL) 24 hr tablet 100 mg 100 mg Every 24 Hours Scheduled 1/6/2018     Sig - Route: Take 2 tablets by mouth Daily. - Oral    pantoprazole (PROTONIX) EC tablet 40 mg 40 mg Daily 1/6/2018     Sig - Route: Take 1 tablet by mouth Daily. - Oral    Pharmacy to dose warfarin  Continuous PRN 1/5/2018     Sig - Route: Continuous As Needed for Consult. - Does not apply    phenol (CHLORASEPTIC) 1.4 % liquid 1 spray 1 spray Every 4 Hours PRN 1/5/2018     Sig - Route: Apply 1 spray to the mouth or throat Every 4 (Four) Hours As Needed for Sore Throat. - Mouth/Throat    spironolactone (ALDACTONE) tablet 25 mg 25 mg Daily 1/6/2018     Sig - Route: Take 1 tablet by mouth Daily. - Oral    tuberculin injection 5 Units 5 Units Once 1/19/2018     Sig - Route: Inject 0.1 mL into the skin 1 (One) Time. - Intradermal    Cosign for Ordering: Required by Myles Barone MD    vitamin d (CHOLECALIFEROL) capsule 5,000 Units 5,000 Units Daily 1/6/2018     Sig - Route: Take 1 capsule by mouth Daily. - Oral         Results Review:     I reviewed the patient's new clinical results.    Medications Reviewed    Assessment/Plan   1.  Respiratory failure   Pulmonology has been following, and feels this is most likely related to fluid overload from heart failure and renal failure.   Continue diuresis as tolerated per nephrology  2.  Acute systolic congestive heart failure   Continue diuresis per nephrology  3.DIANA ON CKD 4   Patient's creatinine slightly better today at 2.04   Nephrology is following  and managing her diuresis  4.  Hypertension stable  5.  Pulmonary hypertension stable  6.  Healthcare associated pneumonia   Continue Augmentin   Repeat chest x-ray in the a.m.  7.  Gray zone troponin   Repeat troponin this morning had decreased  8.  Hyperanticoagulated   Continue to hold Coumadin and monitor PT/INR daily  9.  Hypokalemia   Replace today  Active Problems:    CHF (congestive heart failure)              Joe Watt MD  01/07/18  7:29 AM

## 2018-01-07 NOTE — PLAN OF CARE
Problem: Pressure Ulcer (Adult)  Goal: Signs and Symptoms of Listed Potential Problems Will be Absent or Manageable (Pressure Ulcer)  Outcome: Ongoing (interventions implemented as appropriate)   01/07/18 0210   Pressure Ulcer   Problems Assessed (Pressure Ulcer) all   Problems Present (Pressure Ulcer) pain       Problem: Fall Risk (Adult)  Goal: Identify Related Risk Factors and Signs and Symptoms  Outcome: Ongoing (interventions implemented as appropriate)   01/05/18 1527   Fall Risk   Fall Risk: Related Risk Factors gait/mobility problems;age-related changes   Fall Risk: Signs and Symptoms presence of risk factors     Goal: Absence of Falls  Outcome: Ongoing (interventions implemented as appropriate)   01/07/18 0210   Fall Risk (Adult)   Absence of Falls making progress toward outcome     Goal: Identify Related Risk Factors and Signs and Symptoms  Outcome: Ongoing (interventions implemented as appropriate)   01/05/18 1527   Fall Risk   Fall Risk: Related Risk Factors gait/mobility problems;age-related changes   Fall Risk: Signs and Symptoms presence of risk factors     Goal: Absence of Falls  Outcome: Ongoing (interventions implemented as appropriate)   01/07/18 0210   Fall Risk (Adult)   Absence of Falls making progress toward outcome       Problem: Fluid Volume Excess (Adult,Obstetrics,Pediatric)  Goal: Identify Related Risk Factors and Signs and Symptoms  Outcome: Ongoing (interventions implemented as appropriate)   01/05/18 1527   Fluid Volume Excess   Fluid Volume Excess: Related Risk Factors other (see comments)   Signs and Symptoms (Fluid Volume Excess) activity intolerance     Goal: Stable Weight  Outcome: Ongoing (interventions implemented as appropriate)   01/07/18 0210   Fluid Volume Excess (Adult,Obstetrics,Pediatric)   Stable Weight making progress toward outcome     Goal: Balanced Intake/Output  Outcome: Ongoing (interventions implemented as appropriate)   01/07/18 0210   Fluid Volume Excess  (Adult,Obstetrics,Pediatric)   Balanced Intake/Output making progress toward outcome      01/07/18 0210   Fluid Volume Excess (Adult,Obstetrics,Pediatric)   Balanced Intake/Output making progress toward outcome       Problem: Skin Integrity Impairment, Risk/Actual (Adult)  Goal: Identify Related Risk Factors and Signs and Symptoms  Outcome: Ongoing (interventions implemented as appropriate)   01/06/18 0328   Skin Integrity Impairment, Risk/Actual   Skin Integrity Impairment, Risk/Actual: Related Risk Factors age extremes   Signs and Symptoms (Skin Integrity Impairment) erythema nonblanchable  (DTI)     Goal: Skin Integrity/Wound Healing  Outcome: Ongoing (interventions implemented as appropriate)   01/07/18 0210   Skin Integrity Impairment, Risk/Actual (Adult)   Skin Integrity/Wound Healing making progress toward outcome

## 2018-01-07 NOTE — PLAN OF CARE
Problem: Pressure Ulcer (Adult)  Goal: Signs and Symptoms of Listed Potential Problems Will be Absent or Manageable (Pressure Ulcer)   01/07/18 0210   Pressure Ulcer   Problems Assessed (Pressure Ulcer) all   Problems Present (Pressure Ulcer) pain       Problem: Fall Risk (Adult)  Goal: Identify Related Risk Factors and Signs and Symptoms   01/05/18 1527   Fall Risk   Fall Risk: Related Risk Factors gait/mobility problems;age-related changes   Fall Risk: Signs and Symptoms presence of risk factors     Goal: Absence of Falls   01/07/18 0210   Fall Risk (Adult)   Absence of Falls making progress toward outcome     Goal: Identify Related Risk Factors and Signs and Symptoms   01/05/18 1527   Fall Risk   Fall Risk: Related Risk Factors gait/mobility problems;age-related changes   Fall Risk: Signs and Symptoms presence of risk factors     Goal: Absence of Falls   01/07/18 0210   Fall Risk (Adult)   Absence of Falls making progress toward outcome       Problem: Fluid Volume Excess (Adult,Obstetrics,Pediatric)  Goal: Identify Related Risk Factors and Signs and Symptoms   01/05/18 1527   Fluid Volume Excess   Fluid Volume Excess: Related Risk Factors other (see comments)   Signs and Symptoms (Fluid Volume Excess) activity intolerance     Goal: Stable Weight   01/07/18 0210   Fluid Volume Excess (Adult,Obstetrics,Pediatric)   Stable Weight making progress toward outcome     Goal: Balanced Intake/Output   01/07/18 0210   Fluid Volume Excess (Adult,Obstetrics,Pediatric)   Balanced Intake/Output making progress toward outcome       Problem: Skin Integrity Impairment, Risk/Actual (Adult)  Goal: Identify Related Risk Factors and Signs and Symptoms   01/06/18 0328   Skin Integrity Impairment, Risk/Actual   Skin Integrity Impairment, Risk/Actual: Related Risk Factors age extremes   Signs and Symptoms (Skin Integrity Impairment) erythema nonblanchable  (DTI)     Goal: Skin Integrity/Wound Healing   01/07/18 0210   Skin Integrity  Impairment, Risk/Actual (Adult)   Skin Integrity/Wound Healing making progress toward outcome

## 2018-01-08 LAB
ANION GAP SERPL CALCULATED.3IONS-SCNC: 10.3 MMOL/L (ref 3.6–11.2)
BACTERIA SPEC AEROBE CULT: NORMAL
BACTERIA SPEC AEROBE CULT: NORMAL
BUN BLD-MCNC: 35 MG/DL (ref 7–21)
BUN/CREAT SERPL: 15.7 (ref 7–25)
CALCIUM SPEC-SCNC: 8.2 MG/DL (ref 7.7–10)
CHLORIDE SERPL-SCNC: 102 MMOL/L (ref 99–112)
CO2 SERPL-SCNC: 27.7 MMOL/L (ref 24.3–31.9)
CREAT BLD-MCNC: 2.23 MG/DL (ref 0.43–1.29)
GFR SERPL CREATININE-BSD FRML MDRD: 21 ML/MIN/1.73
GLUCOSE BLD-MCNC: 88 MG/DL (ref 70–110)
INR PPP: 3.77 (ref 0.9–1.1)
OSMOLALITY SERPL CALC.SUM OF ELEC: 286.8 MOSM/KG (ref 273–305)
POTASSIUM BLD-SCNC: 3.8 MMOL/L (ref 3.5–5.3)
PROTHROMBIN TIME: 37.8 SECONDS (ref 11–15.4)
SODIUM BLD-SCNC: 140 MMOL/L (ref 135–153)

## 2018-01-08 PROCEDURE — 94660 CPAP INITIATION&MGMT: CPT

## 2018-01-08 PROCEDURE — 97110 THERAPEUTIC EXERCISES: CPT

## 2018-01-08 PROCEDURE — 97530 THERAPEUTIC ACTIVITIES: CPT

## 2018-01-08 PROCEDURE — 80048 BASIC METABOLIC PNL TOTAL CA: CPT | Performed by: FAMILY MEDICINE

## 2018-01-08 PROCEDURE — 85610 PROTHROMBIN TIME: CPT | Performed by: FAMILY MEDICINE

## 2018-01-08 PROCEDURE — 71045 X-RAY EXAM CHEST 1 VIEW: CPT

## 2018-01-08 PROCEDURE — 97116 GAIT TRAINING THERAPY: CPT

## 2018-01-08 PROCEDURE — 94799 UNLISTED PULMONARY SVC/PX: CPT

## 2018-01-08 RX ORDER — WARFARIN SODIUM 3 MG/1
3 TABLET ORAL
Status: COMPLETED | OUTPATIENT
Start: 2018-01-08 | End: 2018-01-08

## 2018-01-08 RX ADMIN — CASTOR OIL AND BALSAM, PERU: 788; 87 OINTMENT TOPICAL at 08:33

## 2018-01-08 RX ADMIN — FUROSEMIDE 40 MG: 40 TABLET ORAL at 08:33

## 2018-01-08 RX ADMIN — IPRATROPIUM BROMIDE AND ALBUTEROL SULFATE 3 ML: .5; 3 SOLUTION RESPIRATORY (INHALATION) at 12:55

## 2018-01-08 RX ADMIN — PANTOPRAZOLE SODIUM 40 MG: 40 TABLET, DELAYED RELEASE ORAL at 08:33

## 2018-01-08 RX ADMIN — METOPROLOL SUCCINATE 100 MG: 50 TABLET, FILM COATED, EXTENDED RELEASE ORAL at 08:32

## 2018-01-08 RX ADMIN — HYDRALAZINE HYDROCHLORIDE 25 MG: 25 TABLET ORAL at 08:33

## 2018-01-08 RX ADMIN — WARFARIN SODIUM 3 MG: 3 TABLET ORAL at 19:24

## 2018-01-08 RX ADMIN — ATORVASTATIN CALCIUM 40 MG: 40 TABLET, FILM COATED ORAL at 19:23

## 2018-01-08 RX ADMIN — IPRATROPIUM BROMIDE AND ALBUTEROL SULFATE 3 ML: .5; 3 SOLUTION RESPIRATORY (INHALATION) at 18:50

## 2018-01-08 RX ADMIN — IPRATROPIUM BROMIDE AND ALBUTEROL SULFATE 3 ML: .5; 3 SOLUTION RESPIRATORY (INHALATION) at 07:12

## 2018-01-08 RX ADMIN — CITALOPRAM HYDROBROMIDE 20 MG: 20 TABLET ORAL at 08:33

## 2018-01-08 RX ADMIN — HYDRALAZINE HYDROCHLORIDE 25 MG: 25 TABLET ORAL at 19:23

## 2018-01-08 RX ADMIN — SPIRONOLACTONE 25 MG: 25 TABLET ORAL at 08:33

## 2018-01-08 RX ADMIN — HYDROCODONE BITARTRATE AND ACETAMINOPHEN 1 TABLET: 7.5; 325 TABLET ORAL at 11:09

## 2018-01-08 RX ADMIN — ISOSORBIDE MONONITRATE 60 MG: 60 TABLET, EXTENDED RELEASE ORAL at 08:33

## 2018-01-08 RX ADMIN — CASTOR OIL AND BALSAM, PERU: 788; 87 OINTMENT TOPICAL at 19:23

## 2018-01-08 RX ADMIN — AMOXICILLIN AND CLAVULANATE POTASSIUM 500 MG: 500; 125 TABLET, FILM COATED ORAL at 08:33

## 2018-01-08 RX ADMIN — GABAPENTIN 400 MG: 400 CAPSULE ORAL at 19:22

## 2018-01-08 RX ADMIN — CHOLECALCIFEROL CAP 125 MCG (5000 UNIT) 5000 UNITS: 125 CAP at 08:34

## 2018-01-08 RX ADMIN — AMOXICILLIN AND CLAVULANATE POTASSIUM 500 MG: 500; 125 TABLET, FILM COATED ORAL at 19:23

## 2018-01-08 RX ADMIN — ASPIRIN 81 MG: 81 TABLET ORAL at 08:33

## 2018-01-08 NOTE — THERAPY TREATMENT NOTE
Acute Care - Physical Therapy Treatment Note   [place]     Patient Name: Tre Kinney  : 1932  MRN: [id number]  Today's Date: 2018  Onset of Illness/Injury or Date of Surgery Date: 18 (swing bed admit date)  Date of Referral to PT: 18       Admit Date: 2018    Visit Dx:  No diagnosis found.  Patient Active Problem List   Diagnosis   • Acid reflux   • Ankle arthralgia   • Cardiac conduction disorder   • Closed fracture of distal fibula   • Arteriosclerosis of coronary artery   • Fracture of distal end of tibia   • Elevated cholesterol   • BP (high blood pressure)   • Hypertrophic polyarthritis   • OP (osteoporosis)   • Right heart failure   • Heart failure, chronic, right-sided   • SOB (shortness of breath)   • Palpitations   • Acute CHF   • CHF (congestive heart failure)   • Congestive heart failure   • Precordial pain               Adult Rehabilitation Note       18 1750 18 1451 18 1131    Rehab Assessment/Intervention    Discipline physical therapist  -AG occupational therapist  -KR occupational therapist  -    Document Type therapy note (daily note)  -AG therapy note (daily note)  -KR therapy note (daily note)  -    Subjective Information agree to therapy   pt. is anxious  -AG agree to therapy  -KR complains of;weakness;fatigue  -    Patient Effort, Rehab Treatment adequate  -AG      Treatment Not Performed   patient/family declined treatment  -    Symptoms Noted During/After Treatment fatigue   anxiety  -AG      Precautions/Limitations fall precautions;oxygen therapy device and L/min  -AG      Patient Response to Treatment pt. responded favorably to treatment; however, continues to demonstrate some anxiety and SOB with short distance ambulation. More independent sit<>stand with RW noted today.  -AG      Recorded by [AG] Priscilla Cruz, PT [KR] Andres Uriarte, OT [AH] Lois Shetty, OT    Pain Assessment    Pain Assessment No/denies pain  -AG       Recorded by [AG] Priscilla Cruz, PT      Vision Assessment/Intervention    Visual Impairment WFL  -AG      Recorded by [AG] Priscilla Cruz PT      Cognitive Assessment/Intervention    Current Cognitive/Communication Assessment functional  -AG      Orientation Status oriented x 4  -AG      Follows Commands/Answers Questions 100% of the time;able to follow single-step instructions;needs cueing;needs repetition  -AG      Personal Safety decreased awareness, need for assist;decreased awareness, need for safety  -AG      Personal Safety Interventions fall prevention program maintained;gait belt;nonskid shoes/slippers when out of bed;supervised activity  -AG      Recorded by [AG] Priscilla Cruz, PT      Mobility Assessment/Training    Extremity Weight-Bearing Status --   no weightbearing restrictions  -AG      Recorded by [AG] Priscilla Cruz PT      Bed Mobility, Assessment/Treatment    Bed Mobility, Assistive Device bed rails;draw sheet  -AG      Bed Mobility, Scoot/Bridge, [place] verbal cues required;nonverbal cues required (demo/gesture);moderate assist (50% patient effort);maximum assist (25% patient effort);2 person assist required  -AG      Bed Mob, Supine to Sit, [place] verbal cues required;nonverbal cues required (demo/gesture);moderate assist (50% patient effort);maximum assist (25% patient effort);2 person assist required  -AG      Bed Mobility, Safety Issues decreased use of arms for pushing/pulling;decreased use of legs for bridging/pushing  -AG      Bed Mobility, Impairments strength decreased;impaired balance;coordination impaired  -AG      Recorded by [AG] Priscilla Cruz, PT      Transfer Assessment/Treatment    Transfers, Bed-Chair [place] verbal cues required;nonverbal cues required (demo/gesture);minimum assist (75% patient effort);moderate assist (50% patient effort);2 person assist required  -AG      Transfers, Chair-Bed [place] verbal cues required;nonverbal cues required  (demo/gesture);minimum assist (75% patient effort);moderate assist (50% patient effort);2 person assist required  -AG      Transfers, Bed-Chair-Bed, Assist Device rolling walker  -AG      Transfers, Sit-Stand [place] verbal cues required;nonverbal cues required (demo/gesture);minimum assist (75% patient effort);moderate assist (50% patient effort);2 person assist required  -AG      Transfers, Stand-Sit [place] verbal cues required;nonverbal cues required (demo/gesture);minimum assist (75% patient effort);moderate assist (50% patient effort);2 person assist required  -AG      Transfers, Sit-Stand-Sit, Assist Device rolling walker  -AG      Transfer, Safety Issues balance decreased during turns;sequencing ability decreased;step length decreased;weight-shifting ability decreased  -AG      Transfer, Impairments strength decreased;impaired balance;coordination impaired  -AG      Recorded by [AG] Priscilla Cruz, PT      Gait Assessment/Treatment    Gait, [place] Level verbal cues required;nonverbal cues required (demo/gesture);moderate assist (50% patient effort);2 person assist required  -AG      Gait, Assistive Device rolling walker  -AG      Gait, Distance (Feet) 12  -AG      Gait, Gait Pattern Analysis swing-to gait  -AG      Gait, Gait Deviations calvin decreased;decreased heel strike;forward flexed posture;step length decreased;weight-shifting ability decreased  -AG      Gait, Safety Issues balance decreased during turns;sequencing ability decreased;step length decreased;weight-shifting ability decreased;supplemental O2  -AG      Gait, Impairments strength decreased;impaired balance;coordination impaired  -AG      Recorded by [AG] Priscilla Cruz, PT      Motor Skills/Interventions    Additional Documentation Balance Skills Training (Group)  -AG      Recorded by [AG] Priscilla Cruz, PT      Balance Skills Training    Sitting-Level of Assistance Contact guard  -AG      Sitting-Balance Support Feet  supported  -AG      Standing-Level of Assistance Minimum assistance;Moderate assistance;x2  -AG      Static Standing Balance Support assistive device  -AG      Gait Balance-Level of Assistance Moderate assistance;x2  -AG      Gait Balance Support assistive device  -AG      Recorded by [AG] Priscilla Cruz, PT      Therapy Exercises    Bilateral Lower Extremities AROM:;15 reps;sitting;ankle pumps/circles;hip flexion;LAQ  -AG      Bilateral Upper Extremity  AAROM:;sitting;hand pumps;shoulder extension/flexion;10 reps  -KR     Recorded by [AG] Priscilla Cruz, PT [KR] Andres Uriarte, OT     Positioning and Restraints    Pre-Treatment Position in bed  -AG      Post Treatment Position chair  -AG      In Chair sitting;call light within reach;encouraged to call for assist;with family/caregiver  -AG      Recorded by [AG] Priscilla Cruz, PT        01/06/18 1118          Rehab Assessment/Intervention    Discipline physical therapist  -BC      Document Type therapy note (daily note)  -BC      Subjective Information agree to therapy;complains of;weakness  -BC      Patient Effort, Rehab Treatment poor  -BC      Symptoms Noted During/After Treatment fatigue  -BC      Precautions/Limitations fall precautions;oxygen therapy device and L/min  -BC      Recorded by [BC] Vandana Pike, PT      Pain Assessment    Pain Assessment No/denies pain  -BC      Recorded by [BC] Vandana Pike, PT      Cognitive Assessment/Intervention    Current Cognitive/Communication Assessment functional  -BC      Orientation Status oriented to;person;place;situation  -BC      Follows Commands/Answers Questions able to follow single-step instructions;100% of the time  -BC      Personal Safety decreased awareness, need for safety;decreased insight to deficits  -BC      Personal Safety Interventions fall prevention program maintained;gait belt;muscle strengthening facilitated;nonskid shoes/slippers when out of bed  -BC      Recorded by [BC] Vandana Pike, PT       Bed Mobility, Assessment/Treatment    Bed Mobility, Assistive Device bed rails  -BC      Bed Mobility, Scoot/Bridge, [place] moderate assist (50% patient effort);maximum assist (25% patient effort);2 person assist required;verbal cues required;nonverbal cues required (demo/gesture)  -BC      Bed Mob, Supine to Sit, [place] moderate assist (50% patient effort);maximum assist (25% patient effort);2 person assist required;verbal cues required;nonverbal cues required (demo/gesture)  -BC      Bed Mob, Sit to Supine, [place] moderate assist (50% patient effort);maximum assist (25% patient effort);2 person assist required;verbal cues required;nonverbal cues required (demo/gesture)  -BC      Bed Mobility, Impairments strength decreased  -BC      Recorded by [BC] Vandana Pike PT      Transfer Assessment/Treatment    Transfers, Bed-Chair [place] maximum assist (25% patient effort);verbal cues required;nonverbal cues required (demo/gesture)  -BC      Transfers, Bed-Chair-Bed, Assist Device rolling walker  -BC      Transfers, Sit-Stand [place] maximum assist (25% patient effort);2 person assist required;verbal cues required;nonverbal cues required (demo/gesture)  -BC      Transfers, Stand-Sit [place] maximum assist (25% patient effort);2 person assist required;verbal cues required;nonverbal cues required (demo/gesture)  -BC      Transfers, Sit-Stand-Sit, Assist Device rolling walker  -BC      Transfer, Safety Issues sequencing ability decreased;balance decreased during turns  -BC      Transfer, Impairments strength decreased;impaired balance;coordination impaired  -BC      Recorded by [BC] Vandana Pike PT      Gait Assessment/Treatment    Gait, [place] Level moderate assist (50% patient effort);maximum assist (25% patient effort);2 person assist required  -BC      Gait, Assistive Device rolling walker  -BC      Gait, Distance (Feet) 12  -BC      Gait, Safety Issues balance  decreased during turns;sequencing ability decreased;supplemental O2;step length decreased  -BC      Recorded by [BC] Vandana Pike, PT      Therapy Exercises    Bilateral Lower Extremities AROM:;10 reps;sitting  -BC      Recorded by [BC] Vandana Pike PT      Positioning and Restraints    Pre-Treatment Position in bed  -BC      Post Treatment Position chair  -BC      In Bed notified nsg;supine;call light within reach;encouraged to call for assist;side rails up x3  -BC      Recorded by [BC] Vandana Pike PT        User Key  (r) = Recorded By, (t) = Taken By, (c) = Cosigned By    Initials Name Effective Dates    BC Vandana Pike, PT 03/14/16 -     AG Priscilla Cruz, PT 03/14/16 -     KR Andres Uriarte, OT 03/14/16 -      Lois Shetty, OT 03/14/16 -                 IP PT Goals       01/05/18 1712 01/02/18 1727       Bed Mobility PT LTG    Bed Mobility PT LTG, Date Established 01/05/18  -CT 01/02/18  -BC     Bed Mobility PT LTG, Time to Achieve by discharge  -CT by discharge  -BC     Bed Mobility PT LTG, Activity Type all bed mobility  -CT all bed mobility  -BC     Bed Mobility PT LTG, [place] Level minimum assist (75% patient effort);moderate assist (50% patient effort)  -CT minimum assist (75% patient effort)  -BC     Bed Mobility PT Goal  LTG, Assist Device bed rails  -CT bed rails  -BC     Transfer Training PT LTG    Transfer Training PT LTG, Date Established 01/05/18  -CT 01/02/18  -BC     Transfer Training PT LTG, Time to Achieve by discharge  -CT by discharge  -BC     Transfer Training PT LTG, Activity Type bed to chair /chair to bed;sit to stand/stand to sit  -CT all transfers  -BC     Transfer Training PT LTG, [place] Level moderate assist (50% patient effort)  -CT minimum assist (75% patient effort)  -BC     Transfer Training PT LTG, Assist Device other (see comments)   with appropriate AD  -CT walker, rolling  -BC     Gait Training PT LTG    Gait Training Goal PT LTG, Date  Established 01/05/18  -CT 01/02/18  -BC     Gait Training Goal PT LTG, Time to Achieve by discharge  -CT by discharge  -BC     Gait Training Goal PT LTG, [place] Level moderate assist (50% patient effort);2 person assist required  -CT minimum assist (75% patient effort);moderate assist (50% patient effort)  -BC     Gait Training Goal PT LTG, Assist Device other (see comments)   with appropriate AD  -CT walker, rolling  -BC     Gait Training Goal PT LTG, Distance to Achieve 25  -CT 20  -BC       User Key  (r) = Recorded By, (t) = Taken By, (c) = Cosigned By    Initials Name Provider Type    BC Vandana Pike, PT Physical Therapist    CT Bhargavi Vásquez, PT Physical Therapist          Physical Therapy Education     Title: PT OT SLP Therapies (Active)     Topic: Physical Therapy (Active)     Point: Mobility training (Active)    Learning Progress Summary    Learner Readiness Method Response Comment Documented by Status   Patient Acceptance E,D NR  AG 01/08/18 1757 Active    Acceptance E VU  DM 01/08/18 1747 Done    Acceptance E VU  DM 01/07/18 1103 Done    Acceptance E VU  BC 01/06/18 1122 Done    Acceptance E VU  CT 01/05/18 1713 Done               Point: Home exercise program (Active)    Learning Progress Summary    Learner Readiness Method Response Comment Documented by Status   Patient Acceptance E,D NR  AG 01/08/18 1757 Active    Acceptance E VU  DM 01/08/18 1747 Done    Acceptance E VU  DM 01/07/18 1103 Done    Acceptance E VU  BC 01/06/18 1122 Done    Acceptance E VU  CT 01/05/18 1713 Done               Point: Body mechanics (Active)    Learning Progress Summary    Learner Readiness Method Response Comment Documented by Status   Patient Acceptance E,D NR  AG 01/08/18 1757 Active    Acceptance E VU  DM 01/08/18 1747 Done    Acceptance E VU  DM 01/07/18 1103 Done    Acceptance E VU  BC 01/06/18 1122 Done    Acceptance E VU  CT 01/05/18 1713 Done               Point: Precautions (Active)    Learning Progress  Summary    Learner Readiness Method Response Comment Documented by Status   Patient Acceptance E,D NR  AG 01/08/18 1757 Active    Acceptance E VU  DM 01/08/18 1747 Done    Acceptance E VU  DM 01/07/18 1103 Done    Acceptance E VU  BC 01/06/18 1122 Done    Acceptance E VU  CT 01/05/18 1713 Done                      User Key     Initials Effective Dates Name Provider Type Discipline    DM 06/16/16 -  Nehal Oneal, RN Registered Nurse Nurse    BC 03/14/16 -  Vandana Pike, PT Physical Therapist PT    AG 03/14/16 -  Priscilla Cruz, PT Physical Therapist PT    CT 03/14/16 -  Bhargavi Vásquez, PT Physical Therapist PT                    PT Recommendation and Plan  Anticipated Equipment Needs At Discharge: front wheeled walker, bedside commode  Anticipated Discharge Disposition: home with / care, skilled nursing facility  Planned Therapy Interventions: balance training, bed mobility training, gait training, home exercise program, manual therapy techniques, neuromuscular re-education, patient/family education, strengthening, transfer training  PT Frequency: 5 times/wk, per priority policy             Outcome Measures       01/06/18 1100          How much help from another person do you currently need...    Turning from your back to your side while in flat bed without using bedrails? 2  -BC      Moving from lying on back to sitting on the side of a flat bed without bedrails? 2  -BC      Moving to and from a bed to a chair (including a wheelchair)? 2  -BC      Standing up from a chair using your arms (e.g., wheelchair, bedside chair)? 2  -BC      Climbing 3-5 steps with a railing? 1  -BC      To walk in hospital room? 1  -BC      AM-PAC 6 Clicks Score 10  -BC      Functional Assessment    Outcome Measure Options AM-PAC 6 Clicks Basic Mobility (PT)  -BC        User Key  (r) = Recorded By, (t) = Taken By, (c) = Cosigned By    Initials Name Provider Type    BC Vandana Pike, PT Physical Therapist           Time  Calculation:         PT Charges       01/08/18 1758          Time Calculation    PT Received On 01/08/18  -      PT - Next Appointment 01/09/18  -      PT Goal Re-Cert Due Date 01/19/18  -      Time Calculation- PT    TCU Minutes- PT 25 min  -AG        User Key  (r) = Recorded By, (t) = Taken By, (c) = Cosigned By    Initials Name Provider Type     Priscilla Cruz PT Physical Therapist          Therapy Charges for Today     Code Description Service Date Service Provider Modifiers Qty    23585223170 HC GAIT TRAINING EA 15 MIN 1/8/2018 Priscilla Cruz, PT GP 1    69603743659 HC PT THER SUPP EA 15 MIN 1/8/2018 Priscilla Cruz, PT GP 2    25139921840 HC PT THER PROC EA 15 MIN 1/8/2018 Priscilla Cruz, PT GP 1          PT G-Codes  Outcome Measure Options: AM-PAC 6 Clicks Basic Mobility (PT)  Score: 10  Functional Limitation: Mobility: Walking and moving around  Mobility: Walking and Moving Around Current Status (): At least 60 percent but less than 80 percent impaired, limited or restricted  Mobility: Walking and Moving Around Goal Status (): At least 40 percent but less than 60 percent impaired, limited or restricted    Priscilla Cruz PT  1/8/2018

## 2018-01-08 NOTE — THERAPY TREATMENT NOTE
Acute Care - Occupational Therapy Initial Evaluation  [facility] [place]     Patient Name: Tre Kinney  : 1932  MRN: [id number]  Today's Date: 2018  Onset of Illness/Injury or Date of Surgery Date: 18 (swing bed admit date)          Admit Date: 2018     No diagnosis found.  Patient Active Problem List   Diagnosis   • Acid reflux   • Ankle arthralgia   • Cardiac conduction disorder   • Closed fracture of distal fibula   • Arteriosclerosis of coronary artery   • Fracture of distal end of tibia   • Elevated cholesterol   • BP (high blood pressure)   • Hypertrophic polyarthritis   • OP (osteoporosis)   • Right heart failure   • Heart failure, chronic, right-sided   • SOB (shortness of breath)   • Palpitations   • Acute CHF   • CHF (congestive heart failure)   • Congestive heart failure   • Precordial pain     Past Medical History:   Diagnosis Date   • Arthritis    • CHF (congestive heart failure)    • Coronary artery disease    • H/O blood clots    • History of transfusion     no reaction    • Hypertension      Past Surgical History:   Procedure Laterality Date   • ABDOMINAL SURGERY     • APPENDECTOMY     • CARDIAC SURGERY     •  SECTION     • CORONARY ANGIOPLASTY WITH STENT PLACEMENT     • PACEMAKER IMPLANTATION      left side    • SKIN BIOPSY     • TUBAL ABDOMINAL LIGATION            OT ASSESSMENT FLOWSHEET (last 72 hours)      OT Evaluation       18 1451 18 0800 18    Rehab Evaluation    Document Type therapy note (daily note)  -KR        Subjective Information agree to therapy  -KR        Muscle Tone Assessment    Muscle Tone Assessment  Bilateral Upper Extremities  -DM Bilateral Upper Extremities  -CC Bilateral Upper Extremities  -DM Bilateral Upper Extremities  -CC    Bilateral Upper Extremities Muscle Tone Assessment  moderately decreased tone  -DM moderately decreased tone  -CC moderately decreased tone  -DM moderately decreased tone  -CC     Bilateral Lower Extremities Muscle Tone Assessment  moderately decreased tone  -DM moderately decreased tone  -CC moderately decreased tone  -DM moderately decreased tone  -CC    Therapy Exercises    Bilateral Upper Extremity AAROM:;sitting;hand pumps;shoulder extension/flexion;10 reps  -KR          01/06/18 1149 01/06/18 1131 01/06/18 1118 01/06/18 0900 01/05/18 2300    Rehab Evaluation    Document Type  therapy note (daily note)  - therapy note (daily note)  -BC      Subjective Information  complains of;weakness;fatigue  - agree to therapy;complains of;weakness  -BC      Patient Effort, Rehab Treatment   poor  -BC      Symptoms Noted During/After Treatment   fatigue  -BC      General Information    Precautions/Limitations   fall precautions;oxygen therapy device and L/min  -BC      Equipment Currently Used at Home hospital bed;walker, rolling;commode;oxygen   DME provider is [facility].   -        Living Environment    Lives With child(abner), adult   Pt admitted to swing bed on 1/5/18. Pt lives at home with her son Casey. Pt plans to return home at discharge.   -        Pain Assessment    Pain Assessment   No/denies pain  -BC      Cognitive Assessment/Intervention    Current Cognitive/Communication Assessment   functional  -BC      Orientation Status   oriented to;person;place;situation  -BC      Follows Commands/Answers Questions   able to follow single-step instructions;100% of the time  -BC      Personal Safety   decreased awareness, need for safety;decreased insight to deficits  -BC      Personal Safety Interventions   fall prevention program maintained;gait belt;muscle strengthening facilitated;nonskid shoes/slippers when out of bed  -BC      Muscle Tone Assessment    Muscle Tone Assessment    Bilateral Upper Extremities;Bilateral Lower Extremities  -BS Bilateral Upper Extremities  -CC    Bilateral Upper Extremities Muscle Tone Assessment    mildly decreased tone  -BS moderately decreased  tone  -CC    Bilateral Lower Extremities Muscle Tone Assessment    moderately decreased tone  -BS moderately decreased tone  -CC    Bed Mobility, Assessment/Treatment    Bed Mobility, Assistive Device   bed rails  -BC      Bed Mobility, Scoot/Bridge, [place]   moderate assist (50% patient effort);maximum assist (25% patient effort);2 person assist required;verbal cues required;nonverbal cues required (demo/gesture)  -BC      Bed Mob, Supine to Sit, [place]   moderate assist (50% patient effort);maximum assist (25% patient effort);2 person assist required;verbal cues required;nonverbal cues required (demo/gesture)  -BC      Bed Mob, Sit to Supine, [place]   moderate assist (50% patient effort);maximum assist (25% patient effort);2 person assist required;verbal cues required;nonverbal cues required (demo/gesture)  -BC      Bed Mobility, Impairments   strength decreased  -BC      Transfer Assessment/Treatment    Transfers, Bed-Chair [place]   maximum assist (25% patient effort);verbal cues required;nonverbal cues required (demo/gesture)  -BC      Transfers, Bed-Chair-Bed, Assist Device   rolling walker  -BC      Transfers, Sit-Stand [place]   maximum assist (25% patient effort);2 person assist required;verbal cues required;nonverbal cues required (demo/gesture)  -BC      Transfers, Stand-Sit [place]   maximum assist (25% patient effort);2 person assist required;verbal cues required;nonverbal cues required (demo/gesture)  -BC      Transfers, Sit-Stand-Sit, Assist Device   rolling walker  -BC      Transfer, Safety Issues   sequencing ability decreased;balance decreased during turns  -BC      Transfer, Impairments   strength decreased;impaired balance;coordination impaired  -BC      Therapy Exercises    Bilateral Lower Extremities   AROM:;10 reps;sitting  -BC      Positioning and Restraints    Pre-Treatment Position   in bed  -BC      Post Treatment Position   chair  -BC      In Bed    notified nsg;supine;call light within reach;encouraged to call for assist;side rails up x3  -BC        01/05/18 1658 01/05/18 1500 01/05/18 1459          Rehab Evaluation    Document Type evaluation;therapy note (daily note)  -CT  evaluation  -KR      Subjective Information agree to therapy;complains of;weakness;fatigue  -CT  agree to therapy  -KR      Patient Effort, Rehab Treatment poor  -CT  good  -KR      Symptoms Noted During/After Treatment fatigue  -CT        Symptoms Noted Comment Pt admitted to swing bed inpatient status today. Pt needed encouragement to participate with therapy. Pt tolerated fairly well with rest breaks provided as needed. Pt unable to ambulate today and only transferd to chair with knees buckling during transfer.   -CT        General Information    Patient Profile Review yes  -CT        Onset of Illness/Injury or Date of Surgery Date 01/05/18   swing bed admit date  -CT        Precautions/Limitations fall precautions;oxygen therapy device and L/min  -CT        Prior Level of Function min assist:;mod assist:;all household mobility  -CT        Equipment Currently Used at Home hospital bed;walker, rolling;commode;oxygen  -CT hospital bed;walker, rolling;commode;oxygen  -KH       Plans/Goals Discussed With patient;agreed upon  -CT        Risks Reviewed patient:;LOB;nausea/vomiting;dizziness;increased discomfort;change in vital signs;increased drainage;lines disloged  -CT        Benefits Reviewed patient:;improve function;increase independence;increase strength;increase balance;decrease pain;decrease risk of DVT;improve skin integrity;increase knowledge  -CT        Barriers to Rehab previous functional deficit;physical barrier  -CT        Living Environment    Lives With child(abner), adult   she lives with her son  -CT child(abner), adult  -KH child(abner), adult  -KR      Living Arrangements house  -CT house  -KH       Home Accessibility  no concerns  -KH       Stair Railings at Home  none  -KH        Type of Financial/Environmental Concern  none  -KH       Transportation Available  public transportation;car  -KH       Clinical Impression    Criteria for Skilled Therapeutic Interventions Met   yes  -KR      Rehab Potential   good, to achieve stated therapy goals  -KR      Therapy Frequency   3-5 times/wk  -KR      Functional Level Prior    Ambulation  0-->independent  -KH       Transferring  0-->independent  -KH       Toileting  0-->independent  -KH       Bathing  0-->independent  -KH       Dressing  0-->independent  -KH       Eating  0-->independent  -KH       Communication  0-->understands/communicates without difficulty  -KH       Swallowing  0-->swallows foods/liquids without difficulty  -KH       Prior Functional Level Comment  0  -KH       Pain Assessment    Pain Assessment No/denies pain  -CT        Cognitive Assessment/Intervention    Current Cognitive/Communication Assessment functional  -CT  functional  -KR      Orientation Status oriented to;person;place;situation  -CT  oriented to;person;place  -KR      Follows Commands/Answers Questions able to follow single-step instructions;100% of the time  -CT        Personal Safety decreased awareness, need for assist;decreased awareness, need for safety;decreased insight to deficits  -CT        Personal Safety Interventions fall prevention program maintained;gait belt;muscle strengthening facilitated;nonskid shoes/slippers when out of bed  -CT        ROM (Range of Motion)    General ROM no range of motion deficits identified  -CT        General ROM Detail   BUE 4/5  -KR      MMT (Manual Muscle Testing)    General MMT Assessment Detail BLE grossly 3/5  -CT  BUE 3-/5  -KR      Muscle Tone Assessment    Muscle Tone Assessment  Bilateral Lower Extremities  -KH       Bilateral Lower Extremities Muscle Tone Assessment  mildly increased tone  -KH       Bed Mobility, Assessment/Treatment    Bed Mobility, Assistive Device bed rails  -CT        Bed Mobility,  Scoot/Bridge, [place] moderate assist (50% patient effort);maximum assist (25% patient effort);2 person assist required;verbal cues required;nonverbal cues required (demo/gesture)  -CT        Bed Mob, Supine to Sit, [place] moderate assist (50% patient effort);maximum assist (25% patient effort);2 person assist required;verbal cues required;nonverbal cues required (demo/gesture)  -CT        Bed Mob, Sit to Supine, [place] moderate assist (50% patient effort);maximum assist (25% patient effort);2 person assist required;verbal cues required;nonverbal cues required (demo/gesture)  -CT        Bed Mobility, Impairments strength decreased  -CT        Transfer Assessment/Treatment    Transfers, Bed-Chair [place] maximum assist (25% patient effort);verbal cues required;nonverbal cues required (demo/gesture)  -CT        Transfers, Bed-Chair-Bed, Assist Device rolling walker  -CT        Transfers, Sit-Stand [place] maximum assist (25% patient effort);2 person assist required;verbal cues required;nonverbal cues required (demo/gesture)  -CT        Transfers, Stand-Sit [place] maximum assist (25% patient effort);2 person assist required;verbal cues required;nonverbal cues required (demo/gesture)  -CT        Transfers, Sit-Stand-Sit, Assist Device rolling walker  -CT        Transfer, Safety Issues sequencing ability decreased;balance decreased during turns  -CT        Transfer, Impairments strength decreased;impaired balance;coordination impaired  -CT        Upper Body Bathing Assessment/Training    UB Bathing Assess/Train, [place] Level   moderate assist (50% patient effort)  -KR      Lower Body Bathing Assessment/Training    LB Bathing Assess/Train, [place] Level   maximum assist (25% patient effort)  -KR      Upper Body Dressing Assessment/Training    UB Dressing Assess/Train, [place]   moderate assist (50% patient effort)  -KR      Lower Body Dressing Assessment/Training    LB  Dressing Assess/Train, [place]   maximum assist (25% patient effort)  -KR      Toileting Assessment/Training    Toileting Assess/Train, [place] Level   maximum assist (25% patient effort)  -KR      Grooming Assessment/Training    Grooming Assess/Train, [place]ep[place] Level   minimum assist (75% patient effort)  -KR      Therapy Exercises    Bilateral Lower Extremities AROM:;10 reps;sitting  -CT        General Therapy Interventions    Planned Therapy Interventions   activity intolerance;ADL retraining;strengthening  -KR      Positioning and Restraints    Pre-Treatment Position in bed  -CT        Post Treatment Position chair  -CT        In Bed sitting;call light within reach;encouraged to call for assist;notified nsg  -CT          User Key  (r) = Recorded By, (t) = Taken By, (c) = Cosigned By    Initials Name Effective Dates    CC Sherrie Weems, RN 06/16/16 -     DM Nehal Oneal, RN 06/16/16 -     BC Vandana Pike, PT 03/14/16 -     KR Andres Uriarte, OT 03/14/16 -     CT Bhargavi Vásquez, PT 03/14/16 -      Lois Shetty, OT 03/14/16 -      Jessi Sheffield 03/14/16 -      Mariana Alcazar, SONAM 06/10/16 -     BS Tomaas Ramirez, RN 08/22/16 -                  OT Recommendation and Plan  Planned Therapy Interventions: activity intolerance, ADL retraining, strengthening  Therapy Frequency: 3-5 times/wk             OT Goals       01/05/18 1509          Strength OT LTG    Strength Goal OT LTG, Date Established 01/05/18  -KR      Strength Goal OT LTG, Time to Achieve by discharge  -KR      Strength Goal OT LTG, Measure to Achieve BUE increase x 1 to enhance self care/mobility  -KR      ADL OT LTG    ADL OT LTG, Date Established 01/05/18  -KR      ADL OT LTG, Time to Achieve by discharge  -KR      ADL OT LTG, Activity Type ADL skills  -KR      ADL OT LTG, [place] Level min assist  -KR        User Key  (r) = Recorded By, (t) = Taken By, (c) = Cosigned By    Initials Name Provider Type    KARLO Campos  HEIDI Uriarte OT Occupational Therapist                Outcome Measures       01/06/18 1100 01/05/18 1700 01/05/18 1505    How much help from another person do you currently need...    Turning from your back to your side while in flat bed without using bedrails? 2  -BC 2  -CT     Moving from lying on back to sitting on the side of a flat bed without bedrails? 2  -BC 2  -CT     Moving to and from a bed to a chair (including a wheelchair)? 2  -BC 2  -CT     Standing up from a chair using your arms (e.g., wheelchair, bedside chair)? 2  -BC 2  -CT     Climbing 3-5 steps with a railing? 1  -BC 1  -CT     To walk in hospital room? 1  -BC 1  -CT     AM-PAC 6 Clicks Score 10  -BC 10  -CT     How much help from another is currently needed...    Putting on and taking off regular lower body clothing?   2  -KR    Bathing (including washing, rinsing, and drying)   2  -KR    Toileting (which includes using toilet bed pan or urinal)   2  -KR    Putting on and taking off regular upper body clothing   2  -KR    Taking care of personal grooming (such as brushing teeth)   3  -KR    Eating meals   3  -KR    Score   14  -KR    Functional Assessment    Outcome Measure Options AM-PAC 6 Clicks Basic Mobility (PT)  -BC AM-PAC 6 Clicks Basic Mobility (PT)  -CT       01/05/18 1500          Functional Assessment    Outcome Measure Options AM-PAC 6 Clicks Daily Activity (OT)  -KR        User Key  (r) = Recorded By, (t) = Taken By, (c) = Cosigned By    Initials Name Provider Type    BC Vandana Pike, PT Physical Therapist    KR Andres Uriarte, OT Occupational Therapist    ANTIONE Vásquez, EDEL Physical Therapist          Time Calculation:        Therapy Charges for Today     Code Description Service Date Service Provider Modifiers Qty    [id number]  OT THERAPEUTIC ACT EA 15 MIN 1/8/2018 Andres Uriarte OT GO 2          OT G-codes  Functional Limitation: Self care  Self Care Current Status (): At least 60 percent but less than 80 percent  impaired, limited or restricted  Self Care Goal Status (): At least 20 percent but less than 40 percent impaired, limited or restricted    Andres Uriarte, OT  1/8/2018

## 2018-01-08 NOTE — PROGRESS NOTES
Discharge Planning Assessment   [place]     Patient Name: Tre Kinney  MRN: [id number]  Today's Date: 1/8/2018    Admit Date: 1/5/2018       Discharge Plan       01/08/18 1203    Case Management/Social Work Plan    Plan Pt was admitted to swing bed on 1-5-18. Pt lives at home with son and plans to return home at discharge. Pt does not utilize home health services. Pt has a hospital bed, rolling walker, bedside commode and home oxygen @ 2 liters from [facility]. SS to follow.               Erin Quevedo

## 2018-01-08 NOTE — NURSING NOTE
"Pt refused to wear her Bipap at first, but after educating her on it, she agreed to wear it \"for a short time.\" Patient wore it for approximately one hour.   "

## 2018-01-08 NOTE — PLAN OF CARE
Problem: Pressure Ulcer (Adult)  Goal: Signs and Symptoms of Listed Potential Problems Will be Absent or Manageable (Pressure Ulcer)   01/08/18 0057   Pressure Ulcer   Problems Assessed (Pressure Ulcer) all   Problems Present (Pressure Ulcer) (none)       Problem: Fall Risk (Adult)  Goal: Identify Related Risk Factors and Signs and Symptoms   01/05/18 1527   Fall Risk   Fall Risk: Related Risk Factors gait/mobility problems;age-related changes   Fall Risk: Signs and Symptoms presence of risk factors     Goal: Absence of Falls   01/08/18 0057   Fall Risk (Adult)   Absence of Falls making progress toward outcome     Goal: Identify Related Risk Factors and Signs and Symptoms   01/05/18 1527   Fall Risk   Fall Risk: Related Risk Factors gait/mobility problems;age-related changes   Fall Risk: Signs and Symptoms presence of risk factors     Goal: Absence of Falls   01/08/18 0057   Fall Risk (Adult)   Absence of Falls making progress toward outcome       Problem: Fluid Volume Excess (Adult,Obstetrics,Pediatric)  Goal: Identify Related Risk Factors and Signs and Symptoms   01/05/18 1527   Fluid Volume Excess   Fluid Volume Excess: Related Risk Factors other (see comments)   Signs and Symptoms (Fluid Volume Excess) activity intolerance     Goal: Stable Weight   01/08/18 0057   Fluid Volume Excess (Adult,Obstetrics,Pediatric)   Stable Weight making progress toward outcome     Goal: Balanced Intake/Output   01/08/18 0057   Fluid Volume Excess (Adult,Obstetrics,Pediatric)   Balanced Intake/Output making progress toward outcome       Problem: Skin Integrity Impairment, Risk/Actual (Adult)  Goal: Identify Related Risk Factors and Signs and Symptoms   01/06/18 0328   Skin Integrity Impairment, Risk/Actual   Skin Integrity Impairment, Risk/Actual: Related Risk Factors age extremes   Signs and Symptoms (Skin Integrity Impairment) erythema nonblanchable  (DTI)     Goal: Skin Integrity/Wound Healing   01/08/18 0057   Skin Integrity  Impairment, Risk/Actual (Adult)   Skin Integrity/Wound Healing making progress toward outcome       Problem: NPPV/CPAP (Adult)  Goal: Signs and Symptoms of Listed Potential Problems Will be Absent or Manageable (NPPV/CPAP)   01/08/18 0057   NPPV/CPAP   Problems Assessed (NPPV/CPAP) all   Problems Present (NPPV/CPAP) situational response

## 2018-01-08 NOTE — PLAN OF CARE
Problem: Pressure Ulcer (Adult)  Goal: Signs and Symptoms of Listed Potential Problems Will be Absent or Manageable (Pressure Ulcer)   01/08/18 0057   Pressure Ulcer   Problems Assessed (Pressure Ulcer) all   Problems Present (Pressure Ulcer) (none)       Problem: Fall Risk (Adult)  Goal: Identify Related Risk Factors and Signs and Symptoms   01/05/18 1527   Fall Risk   Fall Risk: Related Risk Factors gait/mobility problems;age-related changes   Fall Risk: Signs and Symptoms presence of risk factors      01/05/18 1527   Fall Risk   Fall Risk: Related Risk Factors gait/mobility problems;age-related changes   Fall Risk: Signs and Symptoms presence of risk factors     Goal: Absence of Falls   01/08/18 0057   Fall Risk (Adult)   Absence of Falls making progress toward outcome     Goal: Identify Related Risk Factors and Signs and Symptoms   01/05/18 1527   Fall Risk   Fall Risk: Related Risk Factors gait/mobility problems;age-related changes   Fall Risk: Signs and Symptoms presence of risk factors     Goal: Absence of Falls   01/08/18 0057   Fall Risk (Adult)   Absence of Falls making progress toward outcome       Problem: Fluid Volume Excess (Adult,Obstetrics,Pediatric)  Goal: Identify Related Risk Factors and Signs and Symptoms   01/05/18 1527   Fluid Volume Excess   Fluid Volume Excess: Related Risk Factors other (see comments)   Signs and Symptoms (Fluid Volume Excess) activity intolerance     Goal: Stable Weight   01/08/18 0057   Fluid Volume Excess (Adult,Obstetrics,Pediatric)   Stable Weight making progress toward outcome     Goal: Balanced Intake/Output   01/08/18 0057   Fluid Volume Excess (Adult,Obstetrics,Pediatric)   Balanced Intake/Output making progress toward outcome       Problem: Skin Integrity Impairment, Risk/Actual (Adult)  Goal: Identify Related Risk Factors and Signs and Symptoms   01/06/18 0328   Skin Integrity Impairment, Risk/Actual   Skin Integrity Impairment, Risk/Actual: Related Risk Factors  age extremes   Signs and Symptoms (Skin Integrity Impairment) erythema nonblanchable  (DTI)     Goal: Skin Integrity/Wound Healing   01/08/18 0057   Skin Integrity Impairment, Risk/Actual (Adult)   Skin Integrity/Wound Healing making progress toward outcome       Problem: NPPV/CPAP (Adult)  Goal: Signs and Symptoms of Listed Potential Problems Will be Absent or Manageable (NPPV/CPAP)   01/08/18 0057   NPPV/CPAP   Problems Assessed (NPPV/CPAP) all   Problems Present (NPPV/CPAP) situational response

## 2018-01-08 NOTE — PLAN OF CARE
Problem: Pressure Ulcer (Adult)  Goal: Signs and Symptoms of Listed Potential Problems Will be Absent or Manageable (Pressure Ulcer)  Outcome: Ongoing (interventions implemented as appropriate)   01/08/18 0057   Pressure Ulcer   Problems Assessed (Pressure Ulcer) all   Problems Present (Pressure Ulcer) (none)       Problem: Fall Risk (Adult)  Goal: Identify Related Risk Factors and Signs and Symptoms  Outcome: Ongoing (interventions implemented as appropriate)   01/05/18 1527   Fall Risk   Fall Risk: Related Risk Factors gait/mobility problems;age-related changes   Fall Risk: Signs and Symptoms presence of risk factors     Goal: Absence of Falls   01/08/18 0057   Fall Risk (Adult)   Absence of Falls making progress toward outcome     Goal: Identify Related Risk Factors and Signs and Symptoms  Outcome: Ongoing (interventions implemented as appropriate)   01/05/18 1527   Fall Risk   Fall Risk: Related Risk Factors gait/mobility problems;age-related changes   Fall Risk: Signs and Symptoms presence of risk factors     Goal: Absence of Falls  Outcome: Ongoing (interventions implemented as appropriate)   01/08/18 0057   Fall Risk (Adult)   Absence of Falls making progress toward outcome       Problem: Fluid Volume Excess (Adult,Obstetrics,Pediatric)  Goal: Identify Related Risk Factors and Signs and Symptoms  Outcome: Ongoing (interventions implemented as appropriate)   01/05/18 1527   Fluid Volume Excess   Fluid Volume Excess: Related Risk Factors other (see comments)   Signs and Symptoms (Fluid Volume Excess) activity intolerance     Goal: Stable Weight  Outcome: Ongoing (interventions implemented as appropriate)   01/08/18 0057   Fluid Volume Excess (Adult,Obstetrics,Pediatric)   Stable Weight making progress toward outcome     Goal: Balanced Intake/Output  Outcome: Ongoing (interventions implemented as appropriate)   01/08/18 0057   Fluid Volume Excess (Adult,Obstetrics,Pediatric)   Balanced Intake/Output making  progress toward outcome       Problem: Skin Integrity Impairment, Risk/Actual (Adult)  Goal: Identify Related Risk Factors and Signs and Symptoms  Outcome: Ongoing (interventions implemented as appropriate)   01/06/18 0328   Skin Integrity Impairment, Risk/Actual   Skin Integrity Impairment, Risk/Actual: Related Risk Factors age extremes   Signs and Symptoms (Skin Integrity Impairment) erythema nonblanchable  (DTI)     Goal: Skin Integrity/Wound Healing  Outcome: Ongoing (interventions implemented as appropriate)   01/08/18 0057   Skin Integrity Impairment, Risk/Actual (Adult)   Skin Integrity/Wound Healing making progress toward outcome       Problem: NPPV/CPAP (Adult)  Goal: Signs and Symptoms of Listed Potential Problems Will be Absent or Manageable (NPPV/CPAP)  Outcome: Ongoing (interventions implemented as appropriate)   01/08/18 0057   NPPV/CPAP   Problems Assessed (NPPV/CPAP) all   Problems Present (NPPV/CPAP) situational response

## 2018-01-09 LAB
ANION GAP SERPL CALCULATED.3IONS-SCNC: 5.1 MMOL/L (ref 3.6–11.2)
BUN BLD-MCNC: 31 MG/DL (ref 7–21)
BUN/CREAT SERPL: 15.7 (ref 7–25)
CALCIUM SPEC-SCNC: 8.4 MG/DL (ref 7.7–10)
CHLORIDE SERPL-SCNC: 101 MMOL/L (ref 99–112)
CO2 SERPL-SCNC: 29.9 MMOL/L (ref 24.3–31.9)
CREAT BLD-MCNC: 1.97 MG/DL (ref 0.43–1.29)
GFR SERPL CREATININE-BSD FRML MDRD: 24 ML/MIN/1.73
GLUCOSE BLD-MCNC: 82 MG/DL (ref 70–110)
INR PPP: 3.15 (ref 0.9–1.1)
OSMOLALITY SERPL CALC.SUM OF ELEC: 277.6 MOSM/KG (ref 273–305)
POTASSIUM BLD-SCNC: 3.8 MMOL/L (ref 3.5–5.3)
PROTHROMBIN TIME: 32.9 SECONDS (ref 11–15.4)
SODIUM BLD-SCNC: 136 MMOL/L (ref 135–153)

## 2018-01-09 PROCEDURE — 94799 UNLISTED PULMONARY SVC/PX: CPT

## 2018-01-09 PROCEDURE — 94660 CPAP INITIATION&MGMT: CPT

## 2018-01-09 PROCEDURE — 97116 GAIT TRAINING THERAPY: CPT

## 2018-01-09 PROCEDURE — 80048 BASIC METABOLIC PNL TOTAL CA: CPT | Performed by: FAMILY MEDICINE

## 2018-01-09 PROCEDURE — 97530 THERAPEUTIC ACTIVITIES: CPT

## 2018-01-09 PROCEDURE — 85610 PROTHROMBIN TIME: CPT | Performed by: FAMILY MEDICINE

## 2018-01-09 RX ORDER — WARFARIN SODIUM 4 MG/1
4 TABLET ORAL
Status: COMPLETED | OUTPATIENT
Start: 2018-01-09 | End: 2018-01-09

## 2018-01-09 RX ADMIN — ASPIRIN 81 MG: 81 TABLET ORAL at 09:25

## 2018-01-09 RX ADMIN — AMOXICILLIN AND CLAVULANATE POTASSIUM 500 MG: 500; 125 TABLET, FILM COATED ORAL at 09:25

## 2018-01-09 RX ADMIN — HYDRALAZINE HYDROCHLORIDE 25 MG: 25 TABLET ORAL at 09:24

## 2018-01-09 RX ADMIN — AMOXICILLIN AND CLAVULANATE POTASSIUM 500 MG: 500; 125 TABLET, FILM COATED ORAL at 20:08

## 2018-01-09 RX ADMIN — IPRATROPIUM BROMIDE AND ALBUTEROL SULFATE 3 ML: .5; 3 SOLUTION RESPIRATORY (INHALATION) at 19:15

## 2018-01-09 RX ADMIN — METOPROLOL SUCCINATE 100 MG: 50 TABLET, FILM COATED, EXTENDED RELEASE ORAL at 09:24

## 2018-01-09 RX ADMIN — CITALOPRAM HYDROBROMIDE 20 MG: 20 TABLET ORAL at 09:24

## 2018-01-09 RX ADMIN — CASTOR OIL AND BALSAM, PERU: 788; 87 OINTMENT TOPICAL at 20:08

## 2018-01-09 RX ADMIN — FUROSEMIDE 40 MG: 40 TABLET ORAL at 09:25

## 2018-01-09 RX ADMIN — GABAPENTIN 400 MG: 400 CAPSULE ORAL at 20:08

## 2018-01-09 RX ADMIN — SPIRONOLACTONE 25 MG: 25 TABLET ORAL at 09:24

## 2018-01-09 RX ADMIN — IPRATROPIUM BROMIDE AND ALBUTEROL SULFATE 3 ML: .5; 3 SOLUTION RESPIRATORY (INHALATION) at 00:35

## 2018-01-09 RX ADMIN — HYDRALAZINE HYDROCHLORIDE 25 MG: 25 TABLET ORAL at 20:08

## 2018-01-09 RX ADMIN — ATORVASTATIN CALCIUM 40 MG: 40 TABLET, FILM COATED ORAL at 20:08

## 2018-01-09 RX ADMIN — CASTOR OIL AND BALSAM, PERU: 788; 87 OINTMENT TOPICAL at 09:24

## 2018-01-09 RX ADMIN — ISOSORBIDE MONONITRATE 60 MG: 60 TABLET, EXTENDED RELEASE ORAL at 09:25

## 2018-01-09 RX ADMIN — WARFARIN SODIUM 4 MG: 4 TABLET ORAL at 18:13

## 2018-01-09 RX ADMIN — IPRATROPIUM BROMIDE AND ALBUTEROL SULFATE 3 ML: .5; 3 SOLUTION RESPIRATORY (INHALATION) at 13:23

## 2018-01-09 RX ADMIN — CHOLECALCIFEROL CAP 125 MCG (5000 UNIT) 5000 UNITS: 125 CAP at 09:25

## 2018-01-09 RX ADMIN — PANTOPRAZOLE SODIUM 40 MG: 40 TABLET, DELAYED RELEASE ORAL at 09:25

## 2018-01-09 RX ADMIN — IPRATROPIUM BROMIDE AND ALBUTEROL SULFATE 3 ML: .5; 3 SOLUTION RESPIRATORY (INHALATION) at 07:33

## 2018-01-09 NOTE — PROGRESS NOTES
Tremeron Kinney 85 y.o.   01/09/18    Subjective: Patient continues to improve but still weak and requires assistance with phototherapy.  Objective: Signs stable no change physical exam patient alert and oriented  Vital Signs (last 72 hrs)       01/05 0700  -  01/06 0659 01/06 0700  -  01/07 0659 01/07 0700  -  01/08 0659 01/08 0700  -  01/09 0631   Most Recent    Temp (°F) 98.3 -  98.8    97.8 -  97.9    97.7 -  98.4    98.1 -  98.2     98.1 (36.7)    Heart Rate 76 -  84    72 -  84    78 -  84    76 -  81     79    Resp 18 -  24    20 -  22    14 -  20      18     18    /82 -  143/70    127/63 -  148/64    121/70 -  158/79    152/82 -  174/84     174/84    SpO2 (%) 96 -  97    95 -  98    97 -  99    97 -  98     98        Lab Results (last 72 hours)     Procedure Component Value Units Date/Time    CBC Auto Differential [[id number]]  (Abnormal) Collected:  01/07/18 0508    Specimen:  Blood Updated:  01/07/18 0539     WBC 4.33 (L) 10*3/mm3      RBC 3.49 (L) 10*6/mm3      Hemoglobin 8.9 (L) g/dL      Hematocrit 28.6 (L) %      MCV 81.9 fL      MCH 25.5 (L) pg      MCHC 31.1 (L) g/dL      RDW 16.6 (H) %      RDW-SD 50.0 fl      MPV 10.2 (H) fL      Platelets 134 10*3/mm3      Neutrophil % 70.2 %      Lymphocyte % 17.3 %      Monocyte % 10.2 %      Eosinophil % 1.4 %      Basophil % 0.7 %      Immature Grans % 0.2 %      Neutrophils, Absolute 3.04 10*3/mm3      Lymphocytes, Absolute 0.75 (L) 10*3/mm3      Monocytes, Absolute 0.44 10*3/mm3      Eosinophils, Absolute 0.06 10*3/mm3      Basophils, Absolute 0.03 10*3/mm3      Immature Grans, Absolute 0.01 10*3/mm3      nRBC 0.0 /100 WBC     CBC & Differential [[id number]] Collected:  01/07/18 0508    Specimen:  Blood Updated:  01/07/18 0539    Narrative:       The following orders were created for panel order CBC & Differential.  Procedure                               Abnormality         Status                     ---------                               -----------          ------                     Scan Slide[[id number]]                                                                  CBC Auto Differential[[id number]]        Abnormal            Final result                 Please view results for these tests on the individual orders.    Protime-INR [[id number]]  (Abnormal) Collected:  01/07/18 0508    Specimen:  Blood Updated:  01/07/18 0551     Protime 48.2 (C) Seconds      INR 5.14 (C)    Narrative:       Suggested INR therapeutic range for stable oral anticoagulant therapy:    Low Intensity therapy:   1.5-2.0  Moderate Intensity therapy:   2.0-3.0  High Intensity therapy:   2.5-4.0    Basic Metabolic Panel [[id number]]  (Abnormal) Collected:  01/07/18 0508    Specimen:  Blood Updated:  01/07/18 0559     Glucose 84 mg/dL      BUN 39 (H) mg/dL      Creatinine 2.04 (H) mg/dL      Sodium 140 mmol/L      Potassium 3.3 (L) mmol/L      Chloride 100 mmol/L      CO2 30.1 mmol/L      Calcium 8.4 mg/dL      eGFR Non African Amer 23 (L) mL/min/1.73      BUN/Creatinine Ratio 19.1     Anion Gap 9.9 mmol/L     Narrative:       The MDRD GFR formula is only valid for adults with stable renal function between ages 18 and 70.    Osmolality, Calculated [[id number]]  (Normal) Collected:  01/07/18 0508    Specimen:  Blood Updated:  01/07/18 0559     Osmolality Calc 288.0 mOsm/kg     C-reactive Protein [[id number]]  (Abnormal) Collected:  01/07/18 0508    Specimen:  Blood Updated:  01/07/18 0559     C-Reactive Protein 7.27 (H) mg/dL     Troponin [[id number]]  (Abnormal) Collected:  01/07/18 0508    Specimen:  Blood Updated:  01/07/18 0612     Troponin I 0.095 (H) ng/mL     Narrative:       Ultra Troponin I Reference Range:         <=0.039 ng/mL: Negative    0.04-0.779 ng/mL: Indeterminate Range. Suspicious of MI.  Clinical correlation required.       >=0.78  ng/mL: Consistent with myocardial injury.  Clinical correlation required.    Protime-INR [[id number]]  (Abnormal) Collected:  01/08/18 0153     Specimen:  Blood Updated:  01/08/18 0222     Protime 37.8 (H) Seconds       Note new Reference Range        INR 3.77 (H)    Narrative:       Suggested INR therapeutic range for stable oral anticoagulant therapy:    Low Intensity therapy:   1.5-2.0  Moderate Intensity therapy:   2.0-3.0  High Intensity therapy:   2.5-4.0    Basic Metabolic Panel [[id number]]  (Abnormal) Collected:  01/08/18 0153    Specimen:  Blood Updated:  01/08/18 0231     Glucose 88 mg/dL      BUN 35 (H) mg/dL      Creatinine 2.23 (H) mg/dL      Sodium 140 mmol/L      Potassium 3.8 mmol/L      Chloride 102 mmol/L      CO2 27.7 mmol/L      Calcium 8.2 mg/dL      eGFR Non African Amer 21 (L) mL/min/1.73      BUN/Creatinine Ratio 15.7     Anion Gap 10.3 mmol/L     Narrative:       The MDRD GFR formula is only valid for adults with stable renal function between ages 18 and 70.    Osmolality, Calculated [[id number]]  (Normal) Collected:  01/08/18 0153    Specimen:  Blood Updated:  01/08/18 0231     Osmolality Calc 286.8 mOsm/kg     Protime-INR [[id number]]  (Abnormal) Collected:  01/09/18 0024    Specimen:  Blood Updated:  01/09/18 0102     Protime 32.9 (H) Seconds       Note new Reference Range        INR 3.15 (H)    Narrative:       Suggested INR therapeutic range for stable oral anticoagulant therapy:    Low Intensity therapy:   1.5-2.0  Moderate Intensity therapy:   2.0-3.0  High Intensity therapy:   2.5-4.0    Basic Metabolic Panel [[id number]]  (Abnormal) Collected:  01/09/18 0024    Specimen:  Blood Updated:  01/09/18 0117     Glucose 82 mg/dL      BUN 31 (H) mg/dL      Creatinine 1.97 (H) mg/dL      Sodium 136 mmol/L      Potassium 3.8 mmol/L      Chloride 101 mmol/L      CO2 29.9 mmol/L      Calcium 8.4 mg/dL      eGFR Non African Amer 24 (L) mL/min/1.73      BUN/Creatinine Ratio 15.7     Anion Gap 5.1 mmol/L     Narrative:       The MDRD GFR formula is only valid for adults with stable renal function between ages 18 and 70.    Osmolality,  Calculated [[id number]]  (Normal) Collected:  01/09/18 0024    Specimen:  Blood Updated:  01/09/18 0117     Osmolality Calc 277.6 mOsm/kg         Imaging Results (last 24 hours)     Procedure Component Value Units Date/Time    XR Chest 1 View [[id number]] Collected:  01/08/18 0756     Updated:  01/08/18 0759    Narrative:       EXAMINATION: XR CHEST 1 VW-      CLINICAL INDICATION:     pneumonia     TECHNIQUE:  XR CHEST 1 VW-      COMPARISON: 1/2/2018      FINDINGS:   Developing opacities right lower lung. Stable opacities left midlung.   Cardiomegaly and left basilar atelectasis stable.   Left cardiac pacer.   Vascular congestion noted.   No effusion or pneumothorax.            Impression:       Increasing airspace disease right lung with no significant  change in left lung airspace disease. Underlying changes of CHF noted.     This report was finalized on 1/8/2018 7:57 AM by Dr. Andres Guerra MD.           Assessment:Active Problems:    CHF (congestive heart failure)   Kidney disease  Plan: With physical therapy and gait strength training

## 2018-01-09 NOTE — SIGNIFICANT NOTE
01/09/18 1356   Rehab Treatment   Discipline occupational therapist   Treatment Not Performed patient/family declined treatment  (Pt declined OT on this date depite encouragement secondary to fatigue. Pt stated that she did all that she could do today and declined follow-up on today's date. OT to continue with POC as tolerated. )   Recommendation   OT - Next Appointment 01/10/18

## 2018-01-09 NOTE — THERAPY TREATMENT NOTE
Acute Care - Physical Therapy Treatment Note   [place]     Patient Name: Tre Kinney  : 1932  MRN: [id number]  Today's Date: 2018  Onset of Illness/Injury or Date of Surgery Date: 18 (swing bed admit date)  Date of Referral to PT: 18       Admit Date: 2018    Visit Dx:  No diagnosis found.  Patient Active Problem List   Diagnosis   • Acid reflux   • Ankle arthralgia   • Cardiac conduction disorder   • Closed fracture of distal fibula   • Arteriosclerosis of coronary artery   • Fracture of distal end of tibia   • Elevated cholesterol   • BP (high blood pressure)   • Hypertrophic polyarthritis   • OP (osteoporosis)   • Right heart failure   • Heart failure, chronic, right-sided   • SOB (shortness of breath)   • Palpitations   • Acute CHF   • CHF (congestive heart failure)   • Congestive heart failure   • Precordial pain               Adult Rehabilitation Note       18 1655 18 1750 18 1451    Rehab Assessment/Intervention    Discipline physical therapist  -BC physical therapist  -AG occupational therapist  -KR    Document Type therapy note (daily note)  -BC therapy note (daily note)  -AG therapy note (daily note)  -KR    Subjective Information no complaints;agree to therapy  -BC agree to therapy   pt. is anxious  -AG agree to therapy  -KR    Patient Effort, Rehab Treatment adequate  -BC adequate  -AG     Symptoms Noted During/After Treatment  fatigue   anxiety  -AG     Precautions/Limitations  fall precautions;oxygen therapy device and L/min  -AG     Patient Response to Treatment  pt. responded favorably to treatment; however, continues to demonstrate some anxiety and SOB with short distance ambulation. More independent sit<>stand with RW noted today.  -AG     Recorded by [BC] Vandana Pike, PT [AG] Priscilla Cruz, PT [KR] Andres Uriarte, OT    Pain Assessment    Pain Assessment  No/denies pain  -AG     Recorded by  [AG] Priscilla Cruz, PT     Vision  Assessment/Intervention    Visual Impairment WFL  -BC WFL  -AG     Recorded by [BC] Vandana Pike, PT [AG] Priscilla Cruz PT     Cognitive Assessment/Intervention    Current Cognitive/Communication Assessment functional  -BC functional  -AG     Orientation Status oriented x 4  -BC oriented x 4  -AG     Follows Commands/Answers Questions  100% of the time;able to follow single-step instructions;needs cueing;needs repetition  -AG     Personal Safety  decreased awareness, need for assist;decreased awareness, need for safety  -AG     Personal Safety Interventions  fall prevention program maintained;gait belt;nonskid shoes/slippers when out of bed;supervised activity  -AG     Recorded by [BC] Vandana Pike, PT [AG] Priscilla Cruz PT     Mobility Assessment/Training    Extremity Weight-Bearing Status  --   no weightbearing restrictions  -AG     Recorded by  [AG] Priscilla Cruz PT     Bed Mobility, Assessment/Treatment    Bed Mobility, Assistive Device bed rails;draw sheet  -BC bed rails;draw sheet  -AG     Bed Mobility, [place]/Bridge, [place] verbal cues required;nonverbal cues required (demo/gesture);moderate assist (50% patient effort);maximum assist (25% patient effort);2 person assist required  -BC verbal cues required;nonverbal cues required (demo/gesture);moderate assist (50% patient effort);maximum assist (25% patient effort);2 person assist required  -AG     Bed Mob, Supine to Sit, [place] verbal cues required;nonverbal cues required (demo/gesture);moderate assist (50% patient effort);maximum assist (25% patient effort);2 person assist required  -BC verbal cues required;nonverbal cues required (demo/gesture);moderate assist (50% patient effort);maximum assist (25% patient effort);2 person assist required  -AG     Bed Mobility, Safety Issues decreased use of arms for pushing/pulling;decreased use of legs for bridging/pushing  -BC decreased use of arms for pushing/pulling;decreased use of legs for  bridging/pushing  -AG     Bed Mobility, Impairments strength decreased;impaired balance;coordination impaired  -BC strength decreased;impaired balance;coordination impaired  -AG     Recorded by [BC] Vandana Pike PT [AG] Priscilla Cruz PT     Transfer Assessment/Treatment    Transfers, Bed-Chair [place] verbal cues required;nonverbal cues required (demo/gesture);minimum assist (75% patient effort);moderate assist (50% patient effort);2 person assist required  -BC verbal cues required;nonverbal cues required (demo/gesture);minimum assist (75% patient effort);moderate assist (50% patient effort);2 person assist required  -AG     Transfers, Chair-Bed [place] verbal cues required;nonverbal cues required (demo/gesture);minimum assist (75% patient effort);moderate assist (50% patient effort);2 person assist required  -BC verbal cues required;nonverbal cues required (demo/gesture);minimum assist (75% patient effort);moderate assist (50% patient effort);2 person assist required  -AG     Transfers, Bed-Chair-Bed, Assist Device rolling walker  -BC rolling walker  -AG     Transfers, Sit-Stand [place] verbal cues required;nonverbal cues required (demo/gesture);minimum assist (75% patient effort);moderate assist (50% patient effort);2 person assist required  -BC verbal cues required;nonverbal cues required (demo/gesture);minimum assist (75% patient effort);moderate assist (50% patient effort);2 person assist required  -AG     Transfers, Stand-Sit [place] verbal cues required;nonverbal cues required (demo/gesture);minimum assist (75% patient effort);moderate assist (50% patient effort);2 person assist required  -BC verbal cues required;nonverbal cues required (demo/gesture);minimum assist (75% patient effort);moderate assist (50% patient effort);2 person assist required  -AG     Transfers, Sit-Stand-Sit, Assist Device rolling walker  -BC rolling walker  -AG     Transfer, Safety Issues balance decreased  during turns;sequencing ability decreased;step length decreased;weight-shifting ability decreased  -BC balance decreased during turns;sequencing ability decreased;step length decreased;weight-shifting ability decreased  -AG     Transfer, Impairments strength decreased;impaired balance;coordination impaired  -BC strength decreased;impaired balance;coordination impaired  -AG     Recorded by [BC] Vandana Pike, PT [AG] Priscilla Cruz, PT     Gait Assessment/Treatment    Gait, [place] Level verbal cues required;nonverbal cues required (demo/gesture);moderate assist (50% patient effort);2 person assist required  -BC verbal cues required;nonverbal cues required (demo/gesture);moderate assist (50% patient effort);2 person assist required  -AG     Gait, Assistive Device rolling walker  -BC rolling walker  -AG     Gait, Distance (Feet) 25  -BC 12  -AG     Gait, Gait Pattern Analysis swing-to gait  -BC swing-to gait  -AG     Gait, Gait Deviations  calvin decreased;decreased heel strike;forward flexed posture;step length decreased;weight-shifting ability decreased  -AG     Gait, Safety Issues  balance decreased during turns;sequencing ability decreased;step length decreased;weight-shifting ability decreased;supplemental O2  -AG     Gait, Impairments strength decreased;impaired balance;coordination impaired  -BC strength decreased;impaired balance;coordination impaired  -AG     Recorded by [BC] Vandana Pike, PT [AG] Priscilla Cruz, PT     Motor Skills/Interventions    Additional Documentation  Balance Skills Training (Group)  -AG     Recorded by  [AG] Priscilla Cruz, PT     Balance Skills Training    Sitting-Level of Assistance Contact guard  -BC Contact guard  -AG     Sitting-Balance Support Feet supported  -BC Feet supported  -AG     Standing-Level of Assistance Minimum assistance;Moderate assistance;x2  -BC Minimum assistance;Moderate assistance;x2  -AG     Static Standing Balance Support assistive device  -BC  assistive device  -AG     Gait Balance-Level of Assistance Moderate assistance;x2  -BC Moderate assistance;x2  -AG     Gait Balance Support assistive device  -BC assistive device  -AG     Recorded by [BC] Vandana Pike, PT [AG] Priscilla Cruz PT     Therapy Exercises    Bilateral Lower Extremities AROM:;15 reps;sitting;ankle pumps/circles;hip flexion;LAQ  -BC AROM:;15 reps;sitting;ankle pumps/circles;hip flexion;LAQ  -AG     Bilateral Upper Extremity   AAROM:;sitting;hand pumps;shoulder extension/flexion;10 reps  -KR    Recorded by [BC] Vandana Pike, PT [AG] Priscilla Cruz, PT [KR] Andres Uriarte, OT    Positioning and Restraints    Pre-Treatment Position in bed  -BC in bed  -AG     Post Treatment Position bed  -BC chair  -AG     In Bed notified nsg;supine;call light within reach;encouraged to call for assist;side rails up x3  -BC      In Chair  sitting;call light within reach;encouraged to call for assist;with family/caregiver  -AG     Recorded by [BC] Vandana Pike, PT [AG] Priscilla Cruz, PT       User Key  (r) = Recorded By, (t) = Taken By, (c) = Cosigned By    Initials Name Effective Dates    BC Vandana Pike, PT 03/14/16 -     AG Priscilal Cruz, PT 03/14/16 -     KR Andres Uriarte, OT 03/14/16 -                 IP PT Goals       01/05/18 1712 01/02/18 1727       Bed Mobility PT LTG    Bed Mobility PT LTG, Date Established 01/05/18  -CT 01/02/18  -BC     Bed Mobility PT LTG, Time to Achieve by discharge  -CT by discharge  -BC     Bed Mobility PT LTG, Activity Type all bed mobility  -CT all bed mobility  -BC     Bed Mobility PT LTG, [place] Level minimum assist (75% patient effort);moderate assist (50% patient effort)  -CT minimum assist (75% patient effort)  -BC     Bed Mobility PT Goal  LTG, Assist Device bed rails  -CT bed rails  -BC     Transfer Training PT LTG    Transfer Training PT LTG, Date Established 01/05/18  -CT 01/02/18  -BC     Transfer Training PT LTG, Time to Achieve by discharge  -CT  by discharge  -BC     Transfer Training PT LTG, Activity Type bed to chair /chair to bed;sit to stand/stand to sit  -CT all transfers  -BC     Transfer Training PT LTG, [place] Level moderate assist (50% patient effort)  -CT minimum assist (75% patient effort)  -BC     Transfer Training PT LTG, Assist Device other (see comments)   with appropriate AD  -CT walker, rolling  -BC     Gait Training PT LTG    Gait Training Goal PT LTG, Date Established 01/05/18  -CT 01/02/18  -BC     Gait Training Goal PT LTG, Time to Achieve by discharge  -CT by discharge  -BC     Gait Training Goal PT LTG, [place] Level moderate assist (50% patient effort);2 person assist required  -CT minimum assist (75% patient effort);moderate assist (50% patient effort)  -BC     Gait Training Goal PT LTG, Assist Device other (see comments)   with appropriate AD  -CT walker, rolling  -BC     Gait Training Goal PT LTG, Distance to Achieve 25  -CT 20  -BC       User Key  (r) = Recorded By, (t) = Taken By, (c) = Cosigned By    Initials Name Provider Type    BC Vandana Pike, PT Physical Therapist    CT Bhargavi Vásquez, PT Physical Therapist          Physical Therapy Education     Title: PT OT SLP Therapies (Done)     Topic: Physical Therapy (Done)     Point: Mobility training (Done)    Learning Progress Summary    Learner Readiness Method Response Comment Documented by Status   Patient Acceptance E VU  BC 01/09/18 1657 Done    Acceptance E VU  TD 01/09/18 1611 Done    Acceptance E,D NR  AG 01/08/18 1757 Active    Acceptance E VU  DM 01/08/18 1747 Done    Acceptance E VU  DM 01/07/18 1103 Done    Acceptance E VU  BC 01/06/18 1122 Done    Acceptance E VU  CT 01/05/18 1713 Done               Point: Home exercise program (Done)    Learning Progress Summary    Learner Readiness Method Response Comment Documented by Status   Patient Acceptance E VU  BC 01/09/18 1657 Done    Acceptance E VU  TD 01/09/18 1611 Done    Acceptance E,D NR  AG 01/08/18  1757 Active    Acceptance E VU  DM 01/08/18 1747 Done    Acceptance E VU  DM 01/07/18 1103 Done    Acceptance E VU  BC 01/06/18 1122 Done    Acceptance E VU  CT 01/05/18 1713 Done               Point: Body mechanics (Done)    Learning Progress Summary    Learner Readiness Method Response Comment Documented by Status   Patient Acceptance E VU  BC 01/09/18 1657 Done    Acceptance E VU  TD 01/09/18 1611 Done    Acceptance E,D NR  AG 01/08/18 1757 Active    Acceptance E VU  DM 01/08/18 1747 Done    Acceptance E VU  DM 01/07/18 1103 Done    Acceptance E VU  BC 01/06/18 1122 Done    Acceptance E VU  CT 01/05/18 1713 Done               Point: Precautions (Done)    Learning Progress Summary    Learner Readiness Method Response Comment Documented by Status   Patient Acceptance E VU  BC 01/09/18 1657 Done    Acceptance E VU  TD 01/09/18 1611 Done    Acceptance E,D NR  AG 01/08/18 1757 Active    Acceptance E VU  DM 01/08/18 1747 Done    Acceptance E VU  DM 01/07/18 1103 Done    Acceptance E VU  BC 01/06/18 1122 Done    Acceptance E VU  CT 01/05/18 1713 Done                      User Key     Initials Effective Dates Name Provider Type Discipline    TD 06/16/16 -  Yan Sterling, RN Registered Nurse Nurse    DM 06/16/16 -  Nehal Oneal, RN Registered Nurse Nurse    BC 03/14/16 -  Vandana Pike, PT Physical Therapist PT    AG 03/14/16 -  Priscilla Cruz, PT Physical Therapist PT    CT 03/14/16 -  Bhargavi Vásquez, PT Physical Therapist PT                    PT Recommendation and Plan  Anticipated Equipment Needs At Discharge: front wheeled walker, bedside commode  Anticipated Discharge Disposition: home with 24/7 care, skilled nursing facility  Planned Therapy Interventions: balance training, bed mobility training, gait training, home exercise program, manual therapy techniques, neuromuscular re-education, patient/family education, strengthening, transfer training  PT Frequency: 5 times/wk, per priority policy              Outcome Measures       01/09/18 1600          How much help from another person do you currently need...    Turning from your back to your side while in flat bed without using bedrails? 2  -BC      Moving from lying on back to sitting on the side of a flat bed without bedrails? 2  -BC      Moving to and from a bed to a chair (including a wheelchair)? 2  -BC      Standing up from a chair using your arms (e.g., wheelchair, bedside chair)? 2  -BC      Climbing 3-5 steps with a railing? 1  -BC      To walk in hospital room? 2  -BC      AM-PAC 6 Clicks Score 11  -BC      Functional Assessment    Outcome Measure Options AM-PAC 6 Clicks Basic Mobility (PT)  -BC        User Key  (r) = Recorded By, (t) = Taken By, (c) = Cosigned By    Initials Name Provider Type    WHITNEY Pike PT Physical Therapist           Time Calculation:         PT Charges       01/09/18 1658          Time Calculation    Start Time --   30  -BC      PT Received On 01/09/18  -BC        User Key  (r) = Recorded By, (t) = Taken By, (c) = Cosigned By    Initials Name Provider Type    WHITNEY Pike PT Physical Therapist          Therapy Charges for Today     Code Description Service Date Service Provider Modifiers Qty    16356809355 HC GAIT TRAINING EA 15 MIN 1/9/2018 Vandana Pike, PT GP 1    04561497115 HC PT THERAPEUTIC ACT EA 15 MIN 1/9/2018 Vandana Pike, PT GP 1    79049361285 HC PT THER SUPP EA 15 MIN 1/9/2018 Vandana Pike, PT GP 2          PT G-Codes  Outcome Measure Options: AM-PAC 6 Clicks Basic Mobility (PT)  Score: 10  Functional Limitation: Mobility: Walking and moving around  Mobility: Walking and Moving Around Current Status (): At least 60 percent but less than 80 percent impaired, limited or restricted  Mobility: Walking and Moving Around Goal Status (): At least 40 percent but less than 60 percent impaired, limited or restricted    Vandana Pike PT  1/9/2018

## 2018-01-09 NOTE — PLAN OF CARE
Problem: Pressure Ulcer (Adult)  Goal: Signs and Symptoms of Listed Potential Problems Will be Absent or Manageable (Pressure Ulcer)  Outcome: Ongoing (interventions implemented as appropriate)      Problem: Fall Risk (Adult)  Goal: Identify Related Risk Factors and Signs and Symptoms  Outcome: Ongoing (interventions implemented as appropriate)    Goal: Absence of Falls  Outcome: Ongoing (interventions implemented as appropriate)    Goal: Identify Related Risk Factors and Signs and Symptoms  Outcome: Ongoing (interventions implemented as appropriate)    Goal: Absence of Falls  Outcome: Ongoing (interventions implemented as appropriate)      Problem: Fluid Volume Excess (Adult,Obstetrics,Pediatric)  Goal: Identify Related Risk Factors and Signs and Symptoms  Outcome: Ongoing (interventions implemented as appropriate)    Goal: Stable Weight  Outcome: Ongoing (interventions implemented as appropriate)    Goal: Balanced Intake/Output  Outcome: Ongoing (interventions implemented as appropriate)      Problem: Skin Integrity Impairment, Risk/Actual (Adult)  Goal: Identify Related Risk Factors and Signs and Symptoms  Outcome: Ongoing (interventions implemented as appropriate)    Goal: Skin Integrity/Wound Healing  Outcome: Ongoing (interventions implemented as appropriate)      Problem: NPPV/CPAP (Adult)  Goal: Signs and Symptoms of Listed Potential Problems Will be Absent or Manageable (NPPV/CPAP)  Outcome: Ongoing (interventions implemented as appropriate)

## 2018-01-09 NOTE — PLAN OF CARE
Problem: Pressure Ulcer (Adult)  Intervention: Promote/Optimize Nutrition   01/07/18 2055 01/09/18 1555   Hygiene Care Assistance   Oral Care --  oral care provided   Nutrition Interventions   Oral Nutrition Promotion rest periods promoted --      Intervention: Prevent/Manage Excess Moisture   01/09/18 0800 01/09/18 1555   Hygiene Care Assistance   Perineal Care --  perianal area cleansed;cleansed   Hygiene Care   Bathing/Skin Care --  linen changed;back care;bath, complete;dressed/undressed;foot care;incontinence care;nail care   Skin Interventions   Skin Protection adhesive use limited --      Intervention: Position/Reposition to Promote Ulcer Healing   01/09/18 0800 01/09/18 1500   Positioning   Positioning --  side lying, right   Head Of Bed (HOB) Position --  HOB at 30-45 degrees   Skin Interventions   Pressure Reduction Devices pressure-redistributing mattress utilized --      Intervention: Prevent/Minimize Sheer/Friction Injuries   01/09/18 0800 01/09/18 1500   Positioning   Positioning/Transfer Devices --  pillows;in use   Skin Interventions   Pressure Reduction Techniques heels elevated off bed --    Pressure Reduction Devices pressure-redistributing mattress utilized --        Goal: Signs and Symptoms of Listed Potential Problems Will be Absent or Manageable (Pressure Ulcer)  Outcome: Ongoing (interventions implemented as appropriate)   01/08/18 0057   Pressure Ulcer   Problems Assessed (Pressure Ulcer) all   Problems Present (Pressure Ulcer) (none)       Problem: Fall Risk (Adult)  Intervention: Monitor/Assist with Self Care   01/09/18 0800 01/09/18 1500   Monitor/Assist with Self Care   Ambulation 2-->assistive person --    Transferring 2-->assistive person --    Toileting 2-->assistive person --    Bathing 2-->assistive person --    Dressing 2-->assistive person --    Eating 2-->assistive person --    Communication 0-->understands/communicates without difficulty --    Swallowing (if score 2 or more for  any item, consult Rehab Services) 0-->swallows foods/liquids without difficulty --    Activity   Activity Type --  activity adjusted per tolerance   Activity Level of Assistance --  assistance, 1 person   Musculoskeletal Interventions   Self-Care Promotion independence encouraged;BADL personal objects within reach --      Intervention: Reduce Risk/Promote Restraint Free Environment   01/09/18 1500   Safety Interventions   Safety/Security Measures bed alarm set   Environmental Safety Modification assistive device/personal items within reach;clutter free environment maintained;lighting adjusted;room organization consistent   Restraint Interventions   Safety Promotion/Fall Prevention safety round/check completed;fall prevention program maintained;nonskid shoes/slippers when out of bed     Intervention: Review Medications/Identify Contributors to Fall Risk   01/07/18 2055   Safety Interventions   Medication Review/Management medications reviewed     Intervention: Monitor/Assist with Self Care   01/09/18 0800 01/09/18 1500   Monitor/Assist with Self Care   Ambulation 2-->assistive person --    Transferring 2-->assistive person --    Toileting 2-->assistive person --    Bathing 2-->assistive person --    Dressing 2-->assistive person --    Eating 2-->assistive person --    Communication 0-->understands/communicates without difficulty --    Swallowing (if score 2 or more for any item, consult Rehab Services) 0-->swallows foods/liquids without difficulty --    Activity   Activity Type --  activity adjusted per tolerance   Activity Level of Assistance --  assistance, 1 person   Musculoskeletal Interventions   Self-Care Promotion independence encouraged;BADL personal objects within reach --      Intervention: Reduce Risk/Promote Restraint Free Environment   01/09/18 1500   Safety Interventions   Safety/Security Measures bed alarm set   Environmental Safety Modification assistive device/personal items within reach;clutter free  environment maintained;lighting adjusted;room organization consistent   Restraint Interventions   Safety Promotion/Fall Prevention safety round/check completed;fall prevention program maintained;nonskid shoes/slippers when out of bed     Intervention: Review Medications/Identify Contributors to Fall Risk   01/07/18 2055   Safety Interventions   Medication Review/Management medications reviewed       Goal: Identify Related Risk Factors and Signs and Symptoms  Outcome: Ongoing (interventions implemented as appropriate)   01/05/18 1527   Fall Risk   Fall Risk: Related Risk Factors gait/mobility problems;age-related changes   Fall Risk: Signs and Symptoms presence of risk factors     Goal: Absence of Falls  Outcome: Ongoing (interventions implemented as appropriate)   01/08/18 0057   Fall Risk (Adult)   Absence of Falls making progress toward outcome     Goal: Identify Related Risk Factors and Signs and Symptoms  Outcome: Ongoing (interventions implemented as appropriate)   01/05/18 1527   Fall Risk   Fall Risk: Related Risk Factors gait/mobility problems;age-related changes   Fall Risk: Signs and Symptoms presence of risk factors     Goal: Absence of Falls  Outcome: Ongoing (interventions implemented as appropriate)   01/08/18 0057   Fall Risk (Adult)   Absence of Falls making progress toward outcome       Problem: Fluid Volume Excess (Adult,Obstetrics,Pediatric)  Intervention: Monitor/Manage Hypervolemia   01/09/18 0800 01/09/18 1500   Activity   Activity Type --  activity adjusted per tolerance   Nutrition Interventions   Fluid/Electrolyte Management fluids provided --    Skin Interventions   Skin Protection adhesive use limited --        Goal: Identify Related Risk Factors and Signs and Symptoms  Outcome: Ongoing (interventions implemented as appropriate)   01/05/18 1527   Fluid Volume Excess   Fluid Volume Excess: Related Risk Factors other (see comments)   Signs and Symptoms (Fluid Volume Excess) activity  intolerance     Goal: Stable Weight  Outcome: Ongoing (interventions implemented as appropriate)   01/08/18 0057   Fluid Volume Excess (Adult,Obstetrics,Pediatric)   Stable Weight making progress toward outcome     Goal: Balanced Intake/Output  Outcome: Ongoing (interventions implemented as appropriate)   01/08/18 0057   Fluid Volume Excess (Adult,Obstetrics,Pediatric)   Balanced Intake/Output making progress toward outcome       Problem: Skin Integrity Impairment, Risk/Actual (Adult)  Intervention: Promote/Optimize Nutrition   01/07/18 2055 01/09/18 1555   Hygiene Care Assistance   Oral Care --  oral care provided   Nutrition Interventions   Oral Nutrition Promotion rest periods promoted --      Intervention: Prevent/Manage Excess Moisture   01/09/18 0800 01/09/18 1555   Hygiene Care Assistance   Perineal Care --  perianal area cleansed;cleansed   Hygiene Care   Bathing/Skin Care --  linen changed;back care;bath, complete;dressed/undressed;foot care;incontinence care;nail care   Skin Interventions   Skin Protection adhesive use limited --      Intervention: Prevent/Minimize Sheer/Friction Injuries   01/09/18 0800 01/09/18 1500   Positioning   Positioning/Transfer Devices --  pillows;in use   Skin Interventions   Pressure Reduction Techniques heels elevated off bed --    Pressure Reduction Devices pressure-redistributing mattress utilized --        Goal: Identify Related Risk Factors and Signs and Symptoms  Outcome: Ongoing (interventions implemented as appropriate)   01/06/18 0328   Skin Integrity Impairment, Risk/Actual   Skin Integrity Impairment, Risk/Actual: Related Risk Factors age extremes   Signs and Symptoms (Skin Integrity Impairment) erythema nonblanchable  (DTI)     Goal: Skin Integrity/Wound Healing  Outcome: Ongoing (interventions implemented as appropriate)   01/08/18 0057   Skin Integrity Impairment, Risk/Actual (Adult)   Skin Integrity/Wound Healing making progress toward outcome       Problem:  NPPV/CPAP (Adult)  Goal: Signs and Symptoms of Listed Potential Problems Will be Absent or Manageable (NPPV/CPAP)  Outcome: Ongoing (interventions implemented as appropriate)   01/08/18 0057   NPPV/CPAP   Problems Assessed (NPPV/CPAP) all   Problems Present (NPPV/CPAP) situational response

## 2018-01-10 LAB
ANION GAP SERPL CALCULATED.3IONS-SCNC: 6.3 MMOL/L (ref 3.6–11.2)
BUN BLD-MCNC: 26 MG/DL (ref 7–21)
BUN/CREAT SERPL: 15.2 (ref 7–25)
CALCIUM SPEC-SCNC: 8.9 MG/DL (ref 7.7–10)
CHLORIDE SERPL-SCNC: 101 MMOL/L (ref 99–112)
CO2 SERPL-SCNC: 29.7 MMOL/L (ref 24.3–31.9)
CREAT BLD-MCNC: 1.71 MG/DL (ref 0.43–1.29)
GFR SERPL CREATININE-BSD FRML MDRD: 28 ML/MIN/1.73
GLUCOSE BLD-MCNC: 93 MG/DL (ref 70–110)
INR PPP: 3.02 (ref 0.9–1.1)
OSMOLALITY SERPL CALC.SUM OF ELEC: 278.3 MOSM/KG (ref 273–305)
POTASSIUM BLD-SCNC: 4 MMOL/L (ref 3.5–5.3)
PROTHROMBIN TIME: 31.7 SECONDS (ref 11–15.4)
SODIUM BLD-SCNC: 137 MMOL/L (ref 135–153)

## 2018-01-10 PROCEDURE — 94660 CPAP INITIATION&MGMT: CPT

## 2018-01-10 PROCEDURE — 97116 GAIT TRAINING THERAPY: CPT

## 2018-01-10 PROCEDURE — 94799 UNLISTED PULMONARY SVC/PX: CPT

## 2018-01-10 PROCEDURE — 97530 THERAPEUTIC ACTIVITIES: CPT

## 2018-01-10 PROCEDURE — 85610 PROTHROMBIN TIME: CPT | Performed by: FAMILY MEDICINE

## 2018-01-10 PROCEDURE — 80048 BASIC METABOLIC PNL TOTAL CA: CPT | Performed by: FAMILY MEDICINE

## 2018-01-10 RX ORDER — WARFARIN SODIUM 4 MG/1
4 TABLET ORAL
Status: COMPLETED | OUTPATIENT
Start: 2018-01-10 | End: 2018-01-10

## 2018-01-10 RX ADMIN — IPRATROPIUM BROMIDE AND ALBUTEROL SULFATE 3 ML: .5; 3 SOLUTION RESPIRATORY (INHALATION) at 19:41

## 2018-01-10 RX ADMIN — WARFARIN SODIUM 4 MG: 4 TABLET ORAL at 18:42

## 2018-01-10 RX ADMIN — ISOSORBIDE MONONITRATE 60 MG: 60 TABLET, EXTENDED RELEASE ORAL at 09:38

## 2018-01-10 RX ADMIN — CASTOR OIL AND BALSAM, PERU: 788; 87 OINTMENT TOPICAL at 20:10

## 2018-01-10 RX ADMIN — IPRATROPIUM BROMIDE AND ALBUTEROL SULFATE 3 ML: .5; 3 SOLUTION RESPIRATORY (INHALATION) at 07:23

## 2018-01-10 RX ADMIN — HYDRALAZINE HYDROCHLORIDE 25 MG: 25 TABLET ORAL at 09:38

## 2018-01-10 RX ADMIN — METOPROLOL SUCCINATE 100 MG: 50 TABLET, FILM COATED, EXTENDED RELEASE ORAL at 09:39

## 2018-01-10 RX ADMIN — FUROSEMIDE 40 MG: 40 TABLET ORAL at 09:38

## 2018-01-10 RX ADMIN — HYDROCODONE BITARTRATE AND ACETAMINOPHEN 1 TABLET: 7.5; 325 TABLET ORAL at 20:24

## 2018-01-10 RX ADMIN — SPIRONOLACTONE 25 MG: 25 TABLET ORAL at 09:39

## 2018-01-10 RX ADMIN — IPRATROPIUM BROMIDE AND ALBUTEROL SULFATE 3 ML: .5; 3 SOLUTION RESPIRATORY (INHALATION) at 00:50

## 2018-01-10 RX ADMIN — GABAPENTIN 400 MG: 400 CAPSULE ORAL at 20:24

## 2018-01-10 RX ADMIN — CITALOPRAM HYDROBROMIDE 20 MG: 20 TABLET ORAL at 09:39

## 2018-01-10 RX ADMIN — CASTOR OIL AND BALSAM, PERU: 788; 87 OINTMENT TOPICAL at 09:50

## 2018-01-10 RX ADMIN — IPRATROPIUM BROMIDE AND ALBUTEROL SULFATE 3 ML: .5; 3 SOLUTION RESPIRATORY (INHALATION) at 13:31

## 2018-01-10 RX ADMIN — CHOLECALCIFEROL CAP 125 MCG (5000 UNIT) 5000 UNITS: 125 CAP at 09:38

## 2018-01-10 RX ADMIN — ATORVASTATIN CALCIUM 40 MG: 40 TABLET, FILM COATED ORAL at 20:09

## 2018-01-10 RX ADMIN — PANTOPRAZOLE SODIUM 40 MG: 40 TABLET, DELAYED RELEASE ORAL at 09:38

## 2018-01-10 RX ADMIN — HYDRALAZINE HYDROCHLORIDE 25 MG: 25 TABLET ORAL at 20:10

## 2018-01-10 RX ADMIN — ASPIRIN 81 MG: 81 TABLET ORAL at 09:38

## 2018-01-10 RX ADMIN — AMOXICILLIN AND CLAVULANATE POTASSIUM 500 MG: 500; 125 TABLET, FILM COATED ORAL at 09:38

## 2018-01-10 NOTE — PROGRESS NOTES
Discharge Planning Assessment   [place]     Patient Name: Tre Kinney  MRN: [id number]  Today's Date: 1/10/2018    Admit Date: 1/5/2018    Discharge Plan       01/10/18 1336    Case Management/Social Work Plan    Plan Pt was admitted to swing bed on 1-5-18. SS spoke to pt on this date. Pt lives at home with son and plans to return home at discharge. Pt does not utilize home health services. Pt has a hospital bed, rolling walker, bedside commode and home oxygen @ 2 liters from [facility]. SS to follow.           Erin Quevedo

## 2018-01-10 NOTE — PLAN OF CARE
Problem: Fluid Volume Excess (Adult,Obstetrics,Pediatric)  Goal: Identify Related Risk Factors and Signs and Symptoms  Outcome: Ongoing (interventions implemented as appropriate)    Goal: Stable Weight  Outcome: Ongoing (interventions implemented as appropriate)    Goal: Balanced Intake/Output  Outcome: Ongoing (interventions implemented as appropriate)      Problem: Skin Integrity Impairment, Risk/Actual (Adult)  Goal: Identify Related Risk Factors and Signs and Symptoms  Outcome: Ongoing (interventions implemented as appropriate)    Goal: Skin Integrity/Wound Healing  Outcome: Ongoing (interventions implemented as appropriate)

## 2018-01-10 NOTE — PLAN OF CARE
Problem: Fall Risk (Adult)  Goal: Absence of Falls  Outcome: Ongoing (interventions implemented as appropriate)    Goal: Identify Related Risk Factors and Signs and Symptoms  Outcome: Ongoing (interventions implemented as appropriate)    Goal: Absence of Falls  Outcome: Ongoing (interventions implemented as appropriate)

## 2018-01-10 NOTE — THERAPY TREATMENT NOTE
Acute Care - Physical Therapy Treatment Note   [place]     Patient Name: Tre Kinney  : 1932  MRN: [id number]  Today's Date: 1/10/2018  Onset of Illness/Injury or Date of Surgery Date: 18 (swing bed admit date)  Date of Referral to PT: 18       Admit Date: 2018    Visit Dx:  No diagnosis found.  Patient Active Problem List   Diagnosis   • Acid reflux   • Ankle arthralgia   • Cardiac conduction disorder   • Closed fracture of distal fibula   • Arteriosclerosis of coronary artery   • Fracture of distal end of tibia   • Elevated cholesterol   • BP (high blood pressure)   • Hypertrophic polyarthritis   • OP (osteoporosis)   • Right heart failure   • Heart failure, chronic, right-sided   • SOB (shortness of breath)   • Palpitations   • Acute CHF   • CHF (congestive heart failure)   • Congestive heart failure   • Precordial pain               Adult Rehabilitation Note       01/10/18 1600 18 1655 18 1750    Rehab Assessment/Intervention    Discipline physical therapist  -BC physical therapist  -BC physical therapist  -AG    Document Type therapy note (daily note)  -BC therapy note (daily note)  -BC therapy note (daily note)  -AG    Subjective Information no complaints;agree to therapy  -BC no complaints;agree to therapy  -BC agree to therapy   pt. is anxious  -AG    Patient Effort, Rehab Treatment good  -BC adequate  -BC adequate  -AG    Symptoms Noted During/After Treatment   fatigue   anxiety  -AG    Precautions/Limitations   fall precautions;oxygen therapy device and L/min  -AG    Patient Response to Treatment   pt. responded favorably to treatment; however, continues to demonstrate some anxiety and SOB with short distance ambulation. More independent sit<>stand with RW noted today.  -AG    Recorded by [BC] Vandana Pike, PT [BC] Vandana Pike, PT [AG] Priscilla Cruz, PT    Pain Assessment    Pain Assessment No/denies pain  -BC  No/denies pain  -AG    Recorded by [BC] Vandana  ANIYA Pike, PT  [AG] Priscilla Cruz, PT    Vision Assessment/Intervention    Visual Impairment WFL  -BC WFL  -BC WFL  -AG    Recorded by [BC] Vandana Pike PT [BC] Vandana Pike PT [AG] Priscilla Cruz PT    Cognitive Assessment/Intervention    Current Cognitive/Communication Assessment functional  -BC functional  -BC functional  -AG    Orientation Status oriented x 4  -BC oriented x 4  -BC oriented x 4  -AG    Follows Commands/Answers Questions   100% of the time;able to follow single-step instructions;needs cueing;needs repetition  -AG    Personal Safety   decreased awareness, need for assist;decreased awareness, need for safety  -AG    Personal Safety Interventions   fall prevention program maintained;gait belt;nonskid shoes/slippers when out of bed;supervised activity  -AG    Recorded by [BC] Vandana Pike, PT [BC] Vandana Pike PT [AG] Priscilla Cruz PT    Mobility Assessment/Training    Extremity Weight-Bearing Status   --   no weightbearing restrictions  -AG    Recorded by   [AG] Priscilla Cruz PT    Bed Mobility, Assessment/Treatment    Bed Mobility, Assistive Device bed rails;draw sheet  -BC bed rails;draw sheet  -BC bed rails;draw sheet  -AG    Bed Mobility, [place]/Bridge, [place] verbal cues required;nonverbal cues required (demo/gesture);moderate assist (50% patient effort);maximum assist (25% patient effort);2 person assist required  -BC verbal cues required;nonverbal cues required (demo/gesture);moderate assist (50% patient effort);maximum assist (25% patient effort);2 person assist required  -BC verbal cues required;nonverbal cues required (demo/gesture);moderate assist (50% patient effort);maximum assist (25% patient effort);2 person assist required  -AG    Bed Mob, Supine to Sit, [place] verbal cues required;nonverbal cues required (demo/gesture);moderate assist (50% patient effort);maximum assist (25% patient effort);2 person assist required  -BC verbal cues required;nonverbal  cues required (demo/gesture);moderate assist (50% patient effort);maximum assist (25% patient effort);2 person assist required  -BC verbal cues required;nonverbal cues required (demo/gesture);moderate assist (50% patient effort);maximum assist (25% patient effort);2 person assist required  -AG    Bed Mobility, Safety Issues decreased use of arms for pushing/pulling;decreased use of legs for bridging/pushing  -BC decreased use of arms for pushing/pulling;decreased use of legs for bridging/pushing  -BC decreased use of arms for pushing/pulling;decreased use of legs for bridging/pushing  -AG    Bed Mobility, Impairments strength decreased;impaired balance;coordination impaired  -BC strength decreased;impaired balance;coordination impaired  -BC strength decreased;impaired balance;coordination impaired  -AG    Recorded by [BC] Vandana Pike, PT [BC] Vandana Pike, PT [AG] Priscilla Cruz, PT    Transfer Assessment/Treatment    Transfers, Bed-Chair [place] verbal cues required;nonverbal cues required (demo/gesture);minimum assist (75% patient effort);moderate assist (50% patient effort);2 person assist required  -BC verbal cues required;nonverbal cues required (demo/gesture);minimum assist (75% patient effort);moderate assist (50% patient effort);2 person assist required  -BC verbal cues required;nonverbal cues required (demo/gesture);minimum assist (75% patient effort);moderate assist (50% patient effort);2 person assist required  -AG    Transfers, Chair-Bed [place] verbal cues required;nonverbal cues required (demo/gesture);minimum assist (75% patient effort);moderate assist (50% patient effort);2 person assist required  -BC verbal cues required;nonverbal cues required (demo/gesture);minimum assist (75% patient effort);moderate assist (50% patient effort);2 person assist required  -BC verbal cues required;nonverbal cues required (demo/gesture);minimum assist (75% patient effort);moderate assist (50%  patient effort);2 person assist required  -AG    Transfers, Bed-Chair-Bed, Assist Device rolling walker  -BC rolling walker  -BC rolling walker  -AG    Transfers, Sit-Stand [place] verbal cues required;nonverbal cues required (demo/gesture);minimum assist (75% patient effort);moderate assist (50% patient effort);2 person assist required  -BC verbal cues required;nonverbal cues required (demo/gesture);minimum assist (75% patient effort);moderate assist (50% patient effort);2 person assist required  -BC verbal cues required;nonverbal cues required (demo/gesture);minimum assist (75% patient effort);moderate assist (50% patient effort);2 person assist required  -AG    Transfers, Stand-Sit [place] verbal cues required;nonverbal cues required (demo/gesture);minimum assist (75% patient effort);moderate assist (50% patient effort);2 person assist required  -BC verbal cues required;nonverbal cues required (demo/gesture);minimum assist (75% patient effort);moderate assist (50% patient effort);2 person assist required  -BC verbal cues required;nonverbal cues required (demo/gesture);minimum assist (75% patient effort);moderate assist (50% patient effort);2 person assist required  -AG    Transfers, Sit-Stand-Sit, Assist Device rolling walker  -BC rolling walker  -BC rolling walker  -AG    Transfer, Safety Issues balance decreased during turns;sequencing ability decreased;step length decreased;weight-shifting ability decreased  -BC balance decreased during turns;sequencing ability decreased;step length decreased;weight-shifting ability decreased  -BC balance decreased during turns;sequencing ability decreased;step length decreased;weight-shifting ability decreased  -AG    Transfer, Impairments strength decreased;impaired balance;coordination impaired  -BC strength decreased;impaired balance;coordination impaired  -BC strength decreased;impaired balance;coordination impaired  -AG    Recorded by [BC] Vandana Pike, PT  [BC] Vandana Pike, PT [AG] Priscilla Cruz, PT    Gait Assessment/Treatment    Gait, [place] Level verbal cues required;nonverbal cues required (demo/gesture);moderate assist (50% patient effort);2 person assist required  -BC verbal cues required;nonverbal cues required (demo/gesture);moderate assist (50% patient effort);2 person assist required  -BC verbal cues required;nonverbal cues required (demo/gesture);moderate assist (50% patient effort);2 person assist required  -AG    Gait, Assistive Device rolling walker  -BC rolling walker  -BC rolling walker  -AG    Gait, Distance (Feet) 30  -BC 25  -BC 12  -AG    Gait, Gait Pattern Analysis swing-to gait  -BC swing-to gait  -BC swing-to gait  -AG    Gait, Gait Deviations   calvin decreased;decreased heel strike;forward flexed posture;step length decreased;weight-shifting ability decreased  -AG    Gait, Safety Issues   balance decreased during turns;sequencing ability decreased;step length decreased;weight-shifting ability decreased;supplemental O2  -AG    Gait, Impairments strength decreased;impaired balance;coordination impaired  -BC strength decreased;impaired balance;coordination impaired  -BC strength decreased;impaired balance;coordination impaired  -AG    Recorded by [BC] Vandana Pike, PT [BC] Vandana Pike, PT [AG] Priscilla Cruz, PT    Motor Skills/Interventions    Additional Documentation   Balance Skills Training (Group)  -AG    Recorded by   [AG] Priscilla Cruz, PT    Balance Skills Training    Sitting-Level of Assistance Contact guard  -BC Contact guard  -BC Contact guard  -AG    Sitting-Balance Support Feet supported  -BC Feet supported  -BC Feet supported  -AG    Standing-Level of Assistance Minimum assistance;Moderate assistance;x2  -BC Minimum assistance;Moderate assistance;x2  -BC Minimum assistance;Moderate assistance;x2  -AG    Static Standing Balance Support assistive device  -BC assistive device  -BC assistive device  -AG    Gait  Balance-Level of Assistance Moderate assistance;x2  -BC Moderate assistance;x2  -BC Moderate assistance;x2  -AG    Gait Balance Support assistive device  -BC assistive device  -BC assistive device  -AG    Recorded by [BC] Vandana Pike, PT [BC] Vandana Pike, PT [AG] Priscilla Cruz, PT    Therapy Exercises    Bilateral Lower Extremities AROM:;15 reps;sitting;ankle pumps/circles;hip flexion;LAQ  -BC AROM:;15 reps;sitting;ankle pumps/circles;hip flexion;LAQ  -BC AROM:;15 reps;sitting;ankle pumps/circles;hip flexion;LAQ  -AG    Recorded by [BC] aVndana Pike, PT [BC] Vandana Pike, PT [AG] Priscilla Cruz, PT    Positioning and Restraints    Pre-Treatment Position in bed  -BC in bed  -BC in bed  -AG    Post Treatment Position chair  -BC bed  -BC chair  -AG    In Bed  notified nsg;supine;call light within reach;encouraged to call for assist;side rails up x3  -BC     In Chair notified nsg;sitting;call light within reach;encouraged to call for assist  -BC  sitting;call light within reach;encouraged to call for assist;with family/caregiver  -AG    Recorded by [BC] aVndana Pike, PT [BC] Vandana Pike, PT [AG] Priscilla Cruz, PT      01/08/18 1451          Rehab Assessment/Intervention    Discipline occupational therapist  -KR      Document Type therapy note (daily note)  -KR      Subjective Information agree to therapy  -KR      Recorded by [KR] Andres Uriarte OT      Therapy Exercises    Bilateral Upper Extremity AAROM:;sitting;hand pumps;shoulder extension/flexion;10 reps  -KR      Recorded by [KR] Andres Uriarte OT        User Key  (r) = Recorded By, (t) = Taken By, (c) = Cosigned By    Initials Name Effective Dates    BC Vandana Pike, PT 03/14/16 -     AG Priscilla Cruz, PT 03/14/16 -     KR Andres Uriarte OT 03/14/16 -                 IP PT Goals       01/05/18 1712 01/02/18 1727       Bed Mobility PT LTG    Bed Mobility PT LTG, Date Established 01/05/18  -CT 01/02/18  -BC     Bed Mobility PT LTG, Time  to Achieve by discharge  -CT by discharge  -BC     Bed Mobility PT LTG, Activity Type all bed mobility  -CT all bed mobility  -BC     Bed Mobility PT LTG, [place] Level minimum assist (75% patient effort);moderate assist (50% patient effort)  -CT minimum assist (75% patient effort)  -BC     Bed Mobility PT Goal  LTG, Assist Device bed rails  -CT bed rails  -BC     Transfer Training PT LTG    Transfer Training PT LTG, Date Established 01/05/18  -CT 01/02/18  -BC     Transfer Training PT LTG, Time to Achieve by discharge  -CT by discharge  -BC     Transfer Training PT LTG, Activity Type bed to chair /chair to bed;sit to stand/stand to sit  -CT all transfers  -BC     Transfer Training PT LTG, [place] Level moderate assist (50% patient effort)  -CT minimum assist (75% patient effort)  -BC     Transfer Training PT LTG, Assist Device other (see comments)   with appropriate AD  -CT walker, rolling  -BC     Gait Training PT LTG    Gait Training Goal PT LTG, Date Established 01/05/18  -CT 01/02/18  -BC     Gait Training Goal PT LTG, Time to Achieve by discharge  -CT by discharge  -BC     Gait Training Goal PT LTG, [place] Level moderate assist (50% patient effort);2 person assist required  -CT minimum assist (75% patient effort);moderate assist (50% patient effort)  -BC     Gait Training Goal PT LTG, Assist Device other (see comments)   with appropriate AD  -CT walker, rolling  -BC     Gait Training Goal PT LTG, Distance to Achieve 25  -CT 20  -BC       User Key  (r) = Recorded By, (t) = Taken By, (c) = Cosigned By    Initials Name Provider Type    BC Vandana Pike, PT Physical Therapist    ANTIONE Vásquez, PT Physical Therapist          Physical Therapy Education     Title: PT OT SLP Therapies (Done)     Topic: Physical Therapy (Done)     Point: Mobility training (Done)    Learning Progress Summary    Learner Readiness Method Response Comment Documented by Status   Patient Acceptance E KENTRELL  BC 01/10/18  1645 Done    Acceptance E NR  BW 01/10/18 1407 Active    Acceptance E VU  BC 01/09/18 1657 Done    Acceptance E VU  TD 01/09/18 1611 Done    Acceptance E,D NR  AG 01/08/18 1757 Active    Acceptance E VU  DM 01/08/18 1747 Done    Acceptance E VU  DM 01/07/18 1103 Done    Acceptance E VU  BC 01/06/18 1122 Done    Acceptance E VU  CT 01/05/18 1713 Done               Point: Home exercise program (Done)    Learning Progress Summary    Learner Readiness Method Response Comment Documented by Status   Patient Acceptance E VU  BC 01/10/18 1645 Done    Acceptance E NR  BW 01/10/18 1407 Active    Acceptance E VU  BC 01/09/18 1657 Done    Acceptance E VU  TD 01/09/18 1611 Done    Acceptance E,D NR  AG 01/08/18 1757 Active    Acceptance E VU  DM 01/08/18 1747 Done    Acceptance E VU  DM 01/07/18 1103 Done    Acceptance E VU  BC 01/06/18 1122 Done    Acceptance E VU  CT 01/05/18 1713 Done               Point: Body mechanics (Done)    Learning Progress Summary    Learner Readiness Method Response Comment Documented by Status   Patient Acceptance E VU  BC 01/10/18 1645 Done    Acceptance E NR  BW 01/10/18 1407 Active    Acceptance E VU  BC 01/09/18 1657 Done    Acceptance E VU  TD 01/09/18 1611 Done    Acceptance E,D NR  AG 01/08/18 1757 Active    Acceptance E VU  DM 01/08/18 1747 Done    Acceptance E VU  DM 01/07/18 1103 Done    Acceptance E VU  BC 01/06/18 1122 Done    Acceptance E VU  CT 01/05/18 1713 Done               Point: Precautions (Done)    Learning Progress Summary    Learner Readiness Method Response Comment Documented by Status   Patient Acceptance E VU  BC 01/10/18 1645 Done    Acceptance E NR  BW 01/10/18 1407 Active    Acceptance E VU  BC 01/09/18 1657 Done    Acceptance E VU  TD 01/09/18 1611 Done    Acceptance E,D NR  AG 01/08/18 1757 Active    Acceptance E VU  DM 01/08/18 1747 Done    Acceptance E VU  DM 01/07/18 1103 Done    Acceptance E VU  BC 01/06/18 1122 Done    Acceptance E VU  CT 01/05/18 1713 Done                       User Key     Initials Effective Dates Name Provider Type Discipline    TD 06/16/16 -  Yan Sterling, RN Registered Nurse Nurse    DM 06/16/16 -  Nehal Oneal, RN Registered Nurse Nurse    BW 06/16/16 -  Di Bowen, RN Registered Nurse Nurse    BC 03/14/16 -  Vandana Pike, PT Physical Therapist PT    AG 03/14/16 -  Priscilla Cruz, PT Physical Therapist PT    CT 03/14/16 -  Bhargavi Vásquez PT Physical Therapist PT                    PT Recommendation and Plan  Anticipated Equipment Needs At Discharge: front wheeled walker, bedside commode  Anticipated Discharge Disposition: home with 24/7 care, skilled nursing facility  Planned Therapy Interventions: balance training, bed mobility training, gait training, home exercise program, manual therapy techniques, neuromuscular re-education, patient/family education, strengthening, transfer training  PT Frequency: 5 times/wk, per priority policy             Outcome Measures       01/10/18 1700 01/09/18 1600       How much help from another person do you currently need...    Turning from your back to your side while in flat bed without using bedrails? 3  -BC 2  -BC     Moving from lying on back to sitting on the side of a flat bed without bedrails? 3  -BC 2  -BC     Moving to and from a bed to a chair (including a wheelchair)? 2  -BC 2  -BC     Standing up from a chair using your arms (e.g., wheelchair, bedside chair)? 2  -BC 2  -BC     Climbing 3-5 steps with a railing? 2  -BC 1  -BC     To walk in hospital room? 3  -BC 2  -BC     AM-PAC 6 Clicks Score 15  -BC 11  -BC     Functional Assessment    Outcome Measure Options AM-PAC 6 Clicks Basic Mobility (PT)  -BC AM-PAC 6 Clicks Basic Mobility (PT)  -BC       User Key  (r) = Recorded By, (t) = Taken By, (c) = Cosigned By    Initials Name Provider Type    BC Vandana Pike, PT Physical Therapist           Time Calculation:         PT Charges       01/10/18 1703          Time Calculation    Start Time  --   30  -BC      PT Received On 01/10/18  -BC        User Key  (r) = Recorded By, (t) = Taken By, (c) = Cosigned By    Initials Name Provider Type    BC Vandana Pike, PT Physical Therapist          Therapy Charges for Today     Code Description Service Date Service Provider Modifiers Qty    69477563896 HC GAIT TRAINING EA 15 MIN 1/9/2018 Vandana Pike, PT GP 1    92574189263 HC PT THERAPEUTIC ACT EA 15 MIN 1/9/2018 Vandana Pike, PT GP 1    40968776476 HC PT THER SUPP EA 15 MIN 1/9/2018 Vandana Pike, PT GP 2    52664418154 HC GAIT TRAINING EA 15 MIN 1/10/2018 Vandana Pike, PT GP 1    74396315480 HC PT THERAPEUTIC ACT EA 15 MIN 1/10/2018 Vandana Pike, PT GP 1    05630642943 HC PT THER SUPP EA 15 MIN 1/10/2018 Vandana Pike, PT GP 2          PT G-Codes  Outcome Measure Options: AM-PAC 6 Clicks Basic Mobility (PT)  Score: 10  Functional Limitation: Mobility: Walking and moving around  Mobility: Walking and Moving Around Current Status (): At least 60 percent but less than 80 percent impaired, limited or restricted  Mobility: Walking and Moving Around Goal Status (): At least 40 percent but less than 60 percent impaired, limited or restricted    Vandana Pike, PT  1/10/2018

## 2018-01-10 NOTE — PLAN OF CARE
Problem: Pressure Ulcer (Adult)  Goal: Signs and Symptoms of Listed Potential Problems Will be Absent or Manageable (Pressure Ulcer)  Outcome: Ongoing (interventions implemented as appropriate)      Problem: Fall Risk (Adult)  Goal: Identify Related Risk Factors and Signs and Symptoms  Outcome: Ongoing (interventions implemented as appropriate)   01/05/18 1527 01/09/18 2154   Fall Risk   Fall Risk: Related Risk Factors --  age-related changes;gait/mobility problems;environment unfamiliar   Fall Risk: Signs and Symptoms presence of risk factors --      Goal: Absence of Falls  Outcome: Ongoing (interventions implemented as appropriate)

## 2018-01-11 LAB
ANION GAP SERPL CALCULATED.3IONS-SCNC: 7.1 MMOL/L (ref 3.6–11.2)
BUN BLD-MCNC: 23 MG/DL (ref 7–21)
BUN/CREAT SERPL: 13.5 (ref 7–25)
CALCIUM SPEC-SCNC: 8.8 MG/DL (ref 7.7–10)
CHLORIDE SERPL-SCNC: 102 MMOL/L (ref 99–112)
CO2 SERPL-SCNC: 28.9 MMOL/L (ref 24.3–31.9)
CREAT BLD-MCNC: 1.7 MG/DL (ref 0.43–1.29)
GFR SERPL CREATININE-BSD FRML MDRD: 29 ML/MIN/1.73
GLUCOSE BLD-MCNC: 90 MG/DL (ref 70–110)
INR PPP: 3.07 (ref 0.9–1.1)
OSMOLALITY SERPL CALC.SUM OF ELEC: 278.9 MOSM/KG (ref 273–305)
POTASSIUM BLD-SCNC: 3.8 MMOL/L (ref 3.5–5.3)
PROTHROMBIN TIME: 32.1 SECONDS (ref 11–15.4)
SODIUM BLD-SCNC: 138 MMOL/L (ref 135–153)

## 2018-01-11 PROCEDURE — 97116 GAIT TRAINING THERAPY: CPT

## 2018-01-11 PROCEDURE — 97530 THERAPEUTIC ACTIVITIES: CPT

## 2018-01-11 PROCEDURE — 94799 UNLISTED PULMONARY SVC/PX: CPT

## 2018-01-11 PROCEDURE — 80048 BASIC METABOLIC PNL TOTAL CA: CPT | Performed by: FAMILY MEDICINE

## 2018-01-11 PROCEDURE — 85610 PROTHROMBIN TIME: CPT | Performed by: FAMILY MEDICINE

## 2018-01-11 RX ORDER — WARFARIN SODIUM 3 MG/1
3 TABLET ORAL
Status: COMPLETED | OUTPATIENT
Start: 2018-01-11 | End: 2018-01-11

## 2018-01-11 RX ADMIN — ATORVASTATIN CALCIUM 40 MG: 40 TABLET, FILM COATED ORAL at 20:48

## 2018-01-11 RX ADMIN — PANTOPRAZOLE SODIUM 40 MG: 40 TABLET, DELAYED RELEASE ORAL at 08:37

## 2018-01-11 RX ADMIN — METOPROLOL SUCCINATE 100 MG: 50 TABLET, FILM COATED, EXTENDED RELEASE ORAL at 08:36

## 2018-01-11 RX ADMIN — SPIRONOLACTONE 25 MG: 25 TABLET ORAL at 08:36

## 2018-01-11 RX ADMIN — IPRATROPIUM BROMIDE AND ALBUTEROL SULFATE 3 ML: .5; 3 SOLUTION RESPIRATORY (INHALATION) at 13:24

## 2018-01-11 RX ADMIN — CASTOR OIL AND BALSAM, PERU: 788; 87 OINTMENT TOPICAL at 20:48

## 2018-01-11 RX ADMIN — IPRATROPIUM BROMIDE AND ALBUTEROL SULFATE 3 ML: .5; 3 SOLUTION RESPIRATORY (INHALATION) at 20:07

## 2018-01-11 RX ADMIN — ISOSORBIDE MONONITRATE 60 MG: 60 TABLET, EXTENDED RELEASE ORAL at 08:35

## 2018-01-11 RX ADMIN — FUROSEMIDE 40 MG: 40 TABLET ORAL at 08:35

## 2018-01-11 RX ADMIN — HYDRALAZINE HYDROCHLORIDE 25 MG: 25 TABLET ORAL at 08:37

## 2018-01-11 RX ADMIN — IPRATROPIUM BROMIDE AND ALBUTEROL SULFATE 3 ML: .5; 3 SOLUTION RESPIRATORY (INHALATION) at 07:21

## 2018-01-11 RX ADMIN — CITALOPRAM HYDROBROMIDE 20 MG: 20 TABLET ORAL at 08:36

## 2018-01-11 RX ADMIN — CASTOR OIL AND BALSAM, PERU: 788; 87 OINTMENT TOPICAL at 08:38

## 2018-01-11 RX ADMIN — CHOLECALCIFEROL CAP 125 MCG (5000 UNIT) 5000 UNITS: 125 CAP at 08:34

## 2018-01-11 RX ADMIN — HYDRALAZINE HYDROCHLORIDE 25 MG: 25 TABLET ORAL at 20:48

## 2018-01-11 RX ADMIN — HYDROCODONE BITARTRATE AND ACETAMINOPHEN 1 TABLET: 7.5; 325 TABLET ORAL at 20:51

## 2018-01-11 RX ADMIN — GABAPENTIN 400 MG: 400 CAPSULE ORAL at 20:48

## 2018-01-11 RX ADMIN — WARFARIN SODIUM 3 MG: 3 TABLET ORAL at 17:29

## 2018-01-11 RX ADMIN — ASPIRIN 81 MG: 81 TABLET ORAL at 08:37

## 2018-01-11 NOTE — NURSING NOTE
"Transitional Care Note    Enrolled in [facility] Transitional Care Note    Enrolled in [facility] Transitional Care Services under theTCM Model to be followed for 6 weeks post discharge.  B[facility] will assist with support and education at time of transition home from hospital.  Hospital  will follow throughout the stay at [facility].  Home  will visit within 48 hours of discharge and follow with home vitals and telephone contact for 6 weeks.      Patient admitted to [facility] via ,emergency department for respiratory failure.  After her acute care admission she was admitted to swing bed program for further treatment.    Patient was lying in bed.  I explained transition to home services and patient was on the program before.  She is agreeable to home visits.  Rosanne Palumbo RN will be the home .    Clinical Assessment Instrument Scores:  >Short Portable Mental Status 10, normal mental function  >Geriatric Depression Scale 6  >IADL 1, dependent with ADL's  >PABON-ADL 2, dependent with self-care  >Overall Quality of Life \"FAIR\"  >Subjective Health Rating \"POOR\"  >Symptom Bother Scale 33  >General Anxiety  Scale 3  >REAL SF 7 indicates reads on 12th grade level  "

## 2018-01-11 NOTE — PLAN OF CARE
Problem: Pressure Ulcer (Adult)  Goal: Signs and Symptoms of Listed Potential Problems Will be Absent or Manageable (Pressure Ulcer)  Outcome: Ongoing (interventions implemented as appropriate)      Problem: Fall Risk (Adult)  Goal: Identify Related Risk Factors and Signs and Symptoms  Outcome: Ongoing (interventions implemented as appropriate)    Goal: Absence of Falls  Outcome: Ongoing (interventions implemented as appropriate)      Problem: Skin Integrity Impairment, Risk/Actual (Adult)  Goal: Identify Related Risk Factors and Signs and Symptoms  Outcome: Ongoing (interventions implemented as appropriate)    Goal: Skin Integrity/Wound Healing  Outcome: Ongoing (interventions implemented as appropriate)

## 2018-01-11 NOTE — THERAPY TREATMENT NOTE
Acute Care - Occupational Therapy Treatment Note   [place]     Patient Name: Tre Kinney  : 1932  MRN: [id number]  Today's Date: 2018  Onset of Illness/Injury or Date of Surgery Date: 18 (swing bed admit date)            Admit Date: 2018    Visit Dx:   No diagnosis found.  Patient Active Problem List   Diagnosis   • Acid reflux   • Ankle arthralgia   • Cardiac conduction disorder   • Closed fracture of distal fibula   • Arteriosclerosis of coronary artery   • Fracture of distal end of tibia   • Elevated cholesterol   • BP (high blood pressure)   • Hypertrophic polyarthritis   • OP (osteoporosis)   • Right heart failure   • Heart failure, chronic, right-sided   • SOB (shortness of breath)   • Palpitations   • Acute CHF   • CHF (congestive heart failure)   • Congestive heart failure   • Precordial pain             Adult Rehabilitation Note       18 1307 01/10/18 1600 01/10/18 1457    Rehab Assessment/Intervention    Discipline occupational therapist  -KR physical therapist  -BC occupational therapist  -KR    Document Type therapy note (daily note)  -KR therapy note (daily note)  -BC therapy note (daily note)  -KR    Subjective Information agree to therapy  -KR no complaints;agree to therapy  -BC     Patient Effort, Rehab Treatment good  -KR good  -BC adequate  -KR    Recorded by [KR] Andres Uriarte, OT [BC] Vandana Pike, PT [KR] Andres Uriarte, OT    Pain Assessment    Pain Assessment  No/denies pain  -BC     Recorded by  [BC] Vandana Pike, PT     Vision Assessment/Intervention    Visual Impairment  WFL  -BC     Recorded by  [BC] Vandana Pike, PT     Cognitive Assessment/Intervention    Current Cognitive/Communication Assessment  functional  -BC     Orientation Status  oriented x 4  -BC     Recorded by  [BC] Vandana Pike, PT     Bed Mobility, Assessment/Treatment    Bed Mobility, Assistive Device  bed rails;draw sheet  -BC     Bed Mobility, Scoot/Bridge, [place]  verbal  cues required;nonverbal cues required (demo/gesture);moderate assist (50% patient effort);maximum assist (25% patient effort);2 person assist required  -BC     Bed Mob, Supine to Sit, [place]  verbal cues required;nonverbal cues required (demo/gesture);moderate assist (50% patient effort);maximum assist (25% patient effort);2 person assist required  -BC     Bed Mobility, Safety Issues  decreased use of arms for pushing/pulling;decreased use of legs for bridging/pushing  -BC     Bed Mobility, Impairments  strength decreased;impaired balance;coordination impaired  -BC     Recorded by  [BC] Vandana Pike, PT     Transfer Assessment/Treatment    Transfers, Bed-Chair [place]  verbal cues required;nonverbal cues required (demo/gesture);minimum assist (75% patient effort);moderate assist (50% patient effort);2 person assist required  -BC     Transfers, Chair-Bed [place]  verbal cues required;nonverbal cues required (demo/gesture);minimum assist (75% patient effort);moderate assist (50% patient effort);2 person assist required  -BC     Transfers, Bed-Chair-Bed, Assist Device  rolling walker  -BC     Transfers, Sit-Stand [place]  verbal cues required;nonverbal cues required (demo/gesture);minimum assist (75% patient effort);moderate assist (50% patient effort);2 person assist required  -BC     Transfers, Stand-Sit [place]  verbal cues required;nonverbal cues required (demo/gesture);minimum assist (75% patient effort);moderate assist (50% patient effort);2 person assist required  -BC     Transfers, Sit-Stand-Sit, Assist Device  rolling walker  -BC     Transfer, Safety Issues  balance decreased during turns;sequencing ability decreased;step length decreased;weight-shifting ability decreased  -BC     Transfer, Impairments  strength decreased;impaired balance;coordination impaired  -BC     Recorded by  [BC] Vandana Pike, PT     Gait Assessment/Treatment    Gait, [place] Level  verbal cues  required;nonverbal cues required (demo/gesture);moderate assist (50% patient effort);2 person assist required  -BC     Gait, Assistive Device  rolling walker  -BC     Gait, Distance (Feet)  30  -BC     Gait, Gait Pattern Analysis  swing-to gait  -BC     Gait, Impairments  strength decreased;impaired balance;coordination impaired  -BC     Recorded by  [BC] Vandana Pike, PT     Balance Skills Training    Sitting-Level of Assistance  Contact guard  -BC     Sitting-Balance Support  Feet supported  -BC     Standing-Level of Assistance  Minimum assistance;Moderate assistance;x2  -BC     Static Standing Balance Support  assistive device  -BC     Gait Balance-Level of Assistance  Moderate assistance;x2  -BC     Gait Balance Support  assistive device  -BC     Recorded by  [BC] Vandana Pike PT     Therapy Exercises    Bilateral Lower Extremities  AROM:;15 reps;sitting;ankle pumps/circles;hip flexion;LAQ  -BC     Bilateral Upper Extremity AROM:;supine;elbow flexion/extension;hand pumps;shoulder extension/flexion  -KR  AROM:;supine;elbow flexion/extension;hand pumps;shoulder extension/flexion  -KR    Recorded by [KR] Andres Uriarte OT [BC] Vandana Pike PT [KR] Andres Uriarte OT    Positioning and Restraints    Pre-Treatment Position  in bed  -BC     Post Treatment Position  chair  -BC     In Chair  notified nsg;sitting;call light within reach;encouraged to call for assist  -BC     Recorded by  [BC] Vandana Pike PT       01/09/18 1655 01/08/18 1750 01/08/18 1451    Rehab Assessment/Intervention    Discipline physical therapist  -BC physical therapist  -AG occupational therapist  -KR    Document Type therapy note (daily note)  -BC therapy note (daily note)  -AG therapy note (daily note)  -KR    Subjective Information no complaints;agree to therapy  -BC agree to therapy   pt. is anxious  -AG agree to therapy  -KR    Patient Effort, Rehab Treatment adequate  -BC adequate  -AG     Symptoms Noted During/After  Treatment  fatigue   anxiety  -AG     Precautions/Limitations  fall precautions;oxygen therapy device and L/min  -AG     Patient Response to Treatment  pt. responded favorably to treatment; however, continues to demonstrate some anxiety and SOB with short distance ambulation. More independent sit<>stand with RW noted today.  -AG     Recorded by [BC] Vandana Pike, PT [AG] Priscilla Cruz, PT [KR] Andres Uriarte, OT    Pain Assessment    Pain Assessment  No/denies pain  -AG     Recorded by  [AG] Priscilla Cruz, PT     Vision Assessment/Intervention    Visual Impairment WFL  -BC WFL  -AG     Recorded by [BC] Vandana Pike, PT [AG] Priscilla Cruz, PT     Cognitive Assessment/Intervention    Current Cognitive/Communication Assessment functional  -BC functional  -AG     Orientation Status oriented x 4  -BC oriented x 4  -AG     Follows Commands/Answers Questions  100% of the time;able to follow single-step instructions;needs cueing;needs repetition  -AG     Personal Safety  decreased awareness, need for assist;decreased awareness, need for safety  -AG     Personal Safety Interventions  fall prevention program maintained;gait belt;nonskid shoes/slippers when out of bed;supervised activity  -AG     Recorded by [BC] Vandana Pike, PT [AG] Priscilla Cruz, PT     Mobility Assessment/Training    Extremity Weight-Bearing Status  --   no weightbearing restrictions  -AG     Recorded by  [AG] Priscilla Cruz, PT     Bed Mobility, Assessment/Treatment    Bed Mobility, Assistive Device bed rails;draw sheet  -BC bed rails;draw sheet  -AG     Bed Mobility, Scoot/Bridge, [place] verbal cues required;nonverbal cues required (demo/gesture);moderate assist (50% patient effort);maximum assist (25% patient effort);2 person assist required  -BC verbal cues required;nonverbal cues required (demo/gesture);moderate assist (50% patient effort);maximum assist (25% patient effort);2 person assist required  -AG     Bed Mob, Supine to Sit,  [place] verbal cues required;nonverbal cues required (demo/gesture);moderate assist (50% patient effort);maximum assist (25% patient effort);2 person assist required  -BC verbal cues required;nonverbal cues required (demo/gesture);moderate assist (50% patient effort);maximum assist (25% patient effort);2 person assist required  -AG     Bed Mobility, Safety Issues decreased use of arms for pushing/pulling;decreased use of legs for bridging/pushing  -BC decreased use of arms for pushing/pulling;decreased use of legs for bridging/pushing  -AG     Bed Mobility, Impairments strength decreased;impaired balance;coordination impaired  -BC strength decreased;impaired balance;coordination impaired  -AG     Recorded by [BC] Vandana Pike, PT [AG] Priscilla Cruz, PT     Transfer Assessment/Treatment    Transfers, Bed-Chair [place] verbal cues required;nonverbal cues required (demo/gesture);minimum assist (75% patient effort);moderate assist (50% patient effort);2 person assist required  -BC verbal cues required;nonverbal cues required (demo/gesture);minimum assist (75% patient effort);moderate assist (50% patient effort);2 person assist required  -AG     Transfers, Chair-Bed [place] verbal cues required;nonverbal cues required (demo/gesture);minimum assist (75% patient effort);moderate assist (50% patient effort);2 person assist required  -BC verbal cues required;nonverbal cues required (demo/gesture);minimum assist (75% patient effort);moderate assist (50% patient effort);2 person assist required  -AG     Transfers, Bed-Chair-Bed, Assist Device rolling walker  -BC rolling walker  -AG     Transfers, Sit-Stand [place] verbal cues required;nonverbal cues required (demo/gesture);minimum assist (75% patient effort);moderate assist (50% patient effort);2 person assist required  -BC verbal cues required;nonverbal cues required (demo/gesture);minimum assist (75% patient effort);moderate assist (50% patient  effort);2 person assist required  -AG     Transfers, Stand-Sit [place] verbal cues required;nonverbal cues required (demo/gesture);minimum assist (75% patient effort);moderate assist (50% patient effort);2 person assist required  -BC verbal cues required;nonverbal cues required (demo/gesture);minimum assist (75% patient effort);moderate assist (50% patient effort);2 person assist required  -AG     Transfers, Sit-Stand-Sit, Assist Device rolling walker  -BC rolling walker  -AG     Transfer, Safety Issues balance decreased during turns;sequencing ability decreased;step length decreased;weight-shifting ability decreased  -BC balance decreased during turns;sequencing ability decreased;step length decreased;weight-shifting ability decreased  -AG     Transfer, Impairments strength decreased;impaired balance;coordination impaired  -BC strength decreased;impaired balance;coordination impaired  -AG     Recorded by [BC] Vandana Pike, PT [AG] Priscilla Cruz, PT     Gait Assessment/Treatment    Gait, [place] Level verbal cues required;nonverbal cues required (demo/gesture);moderate assist (50% patient effort);2 person assist required  -BC verbal cues required;nonverbal cues required (demo/gesture);moderate assist (50% patient effort);2 person assist required  -AG     Gait, Assistive Device rolling walker  -BC rolling walker  -AG     Gait, Distance (Feet) 25  -BC 12  -AG     Gait, Gait Pattern Analysis swing-to gait  -BC swing-to gait  -AG     Gait, Gait Deviations  calvin decreased;decreased heel strike;forward flexed posture;step length decreased;weight-shifting ability decreased  -AG     Gait, Safety Issues  balance decreased during turns;sequencing ability decreased;step length decreased;weight-shifting ability decreased;supplemental O2  -AG     Gait, Impairments strength decreased;impaired balance;coordination impaired  -BC strength decreased;impaired balance;coordination impaired  -AG     Recorded by [BC]  Vandana Pike, PT [AG] Priscilla Cruz PT     Motor Skills/Interventions    Additional Documentation  Balance Skills Training (Group)  -AG     Recorded by  [AG] Priscilla Cruz PT     Balance Skills Training    Sitting-Level of Assistance Contact guard  -BC Contact guard  -AG     Sitting-Balance Support Feet supported  -BC Feet supported  -AG     Standing-Level of Assistance Minimum assistance;Moderate assistance;x2  -BC Minimum assistance;Moderate assistance;x2  -AG     Static Standing Balance Support assistive device  -BC assistive device  -AG     Gait Balance-Level of Assistance Moderate assistance;x2  -BC Moderate assistance;x2  -AG     Gait Balance Support assistive device  -BC assistive device  -AG     Recorded by [BC] Vandana Pike, PT [AG] Priscilla Cruz PT     Therapy Exercises    Bilateral Lower Extremities AROM:;15 reps;sitting;ankle pumps/circles;hip flexion;LAQ  -BC AROM:;15 reps;sitting;ankle pumps/circles;hip flexion;LAQ  -AG     Bilateral Upper Extremity   AAROM:;sitting;hand pumps;shoulder extension/flexion;10 reps  -KR    Recorded by [BC] Vnadana Pike, PT [AG] Priscilla Cruz, PT [KR] Andres Uriarte OT    Positioning and Restraints    Pre-Treatment Position in bed  -BC in bed  -AG     Post Treatment Position bed  -BC chair  -AG     In Bed notified nsg;supine;call light within reach;encouraged to call for assist;side rails up x3  -BC      In Chair  sitting;call light within reach;encouraged to call for assist;with family/caregiver  -AG     Recorded by [BC] Vandana Pike, PT [AG] Priscilla Cruz PT       User Key  (r) = Recorded By, (t) = Taken By, (c) = Cosigned By    Initials Name Effective Dates    BC Vandana Pike, PT 03/14/16 -     AG Priscilla Cruz, PT 03/14/16 -     KR Andres Uriarte OT 03/14/16 -                 OT Goals       01/05/18 1509          Strength OT LTG    Strength Goal OT LTG, Date Established 01/05/18  -KR      Strength Goal OT LTG, Time to Achieve by discharge  -KR       Strength Goal OT LTG, Measure to Achieve BUE increase x 1 to enhance self care/mobility  -KR      ADL OT LTG    ADL OT LTG, Date Established 01/05/18  -KR      ADL OT LTG, Time to Achieve by discharge  -KR      ADL OT LTG, Activity Type ADL skills  -KR      ADL OT LTG, [place] Level min assist  -KR        User Key  (r) = Recorded By, (t) = Taken By, (c) = Cosigned By    Initials Name Provider Type    KARLO Uriarte OT Occupational Therapist                OT Recommendation and Plan  Planned Therapy Interventions: activity intolerance, ADL retraining, strengthening  Therapy Frequency: 3-5 times/wk           Outcome Measures       01/10/18 1700 01/09/18 1600       How much help from another person do you currently need...    Turning from your back to your side while in flat bed without using bedrails? 3  -BC 2  -BC     Moving from lying on back to sitting on the side of a flat bed without bedrails? 3  -BC 2  -BC     Moving to and from a bed to a chair (including a wheelchair)? 2  -BC 2  -BC     Standing up from a chair using your arms (e.g., wheelchair, bedside chair)? 2  -BC 2  -BC     Climbing 3-5 steps with a railing? 2  -BC 1  -BC     To walk in hospital room? 3  -BC 2  -BC     AM-PAC 6 Clicks Score 15  -BC 11  -BC     Functional Assessment    Outcome Measure Options AM-PAC 6 Clicks Basic Mobility (PT)  -BC AM-PAC 6 Clicks Basic Mobility (PT)  -BC       User Key  (r) = Recorded By, (t) = Taken By, (c) = Cosigned By    Initials Name Provider Type    WHITNEY Pike, PT Physical Therapist           Time Calculation:         Time Calculation- OT       01/11/18 1307          Time Calculation- OT    Total Timed Code Minutes- OT 15 minute(s)  -KR        User Key  (r) = Recorded By, (t) = Taken By, (c) = Cosigned By    Initials Name Provider Type    KARLO Uriarte OT Occupational Therapist           Therapy Charges for Today     Code Description Service Date Service Provider Modifiers Qty    [id number]   OT THERAPEUTIC ACT EA 15 MIN 1/10/2018 Andres Uriarte OT GO 1    [id number]  OT THERAPEUTIC ACT EA 15 MIN 1/11/2018 nAdres Uriarte OT GO 1          OT G-codes  Functional Limitation: Self care  Self Care Current Status (): At least 60 percent but less than 80 percent impaired, limited or restricted  Self Care Goal Status (): At least 20 percent but less than 40 percent impaired, limited or restricted    Andres Uriarte OT  1/11/2018

## 2018-01-11 NOTE — THERAPY TREATMENT NOTE
Acute Care - Occupational Therapy Treatment Note   [place]     Patient Name: Tre Kinney  : 1932  MRN: [id number]  Today's Date: 2018  Onset of Illness/Injury or Date of Surgery Date: 18 (swing bed admit date)            Admit Date: 2018    Visit Dx:   No diagnosis found.  Patient Active Problem List   Diagnosis   • Acid reflux   • Ankle arthralgia   • Cardiac conduction disorder   • Closed fracture of distal fibula   • Arteriosclerosis of coronary artery   • Fracture of distal end of tibia   • Elevated cholesterol   • BP (high blood pressure)   • Hypertrophic polyarthritis   • OP (osteoporosis)   • Right heart failure   • Heart failure, chronic, right-sided   • SOB (shortness of breath)   • Palpitations   • Acute CHF   • CHF (congestive heart failure)   • Congestive heart failure   • Precordial pain             Adult Rehabilitation Note       01/10/18 1600 01/10/18 1457 18 1655    Rehab Assessment/Intervention    Discipline physical therapist  -BC occupational therapist  -KR physical therapist  -BC    Document Type therapy note (daily note)  -BC therapy note (daily note)  -KR therapy note (daily note)  -BC    Subjective Information no complaints;agree to therapy  -BC  no complaints;agree to therapy  -BC    Patient Effort, Rehab Treatment good  -BC adequate  -KR adequate  -BC    Recorded by [BC] Vandana Pike, PT [KR] Andres Uriarte, OT [BC] Vandana Pike PT    Pain Assessment    Pain Assessment No/denies pain  -BC      Recorded by [BC] Vandana Pike PT      Vision Assessment/Intervention    Visual Impairment WFL  -BC  WFL  -BC    Recorded by [BC] Vandana Pike, PT  [BC] Vandana Pike PT    Cognitive Assessment/Intervention    Current Cognitive/Communication Assessment functional  -BC  functional  -BC    Orientation Status oriented x 4  -BC  oriented x 4  -BC    Recorded by [BC] Vandana Pike, PT  [BC] Vandana Pike PT    Bed Mobility, Assessment/Treatment     Bed Mobility, Assistive Device bed rails;draw sheet  -BC  bed rails;draw sheet  -BC    Bed Mobility, Scoot/Bridge, [place] verbal cues required;nonverbal cues required (demo/gesture);moderate assist (50% patient effort);maximum assist (25% patient effort);2 person assist required  -BC  verbal cues required;nonverbal cues required (demo/gesture);moderate assist (50% patient effort);maximum assist (25% patient effort);2 person assist required  -BC    Bed Mob, Supine to Sit, [place] verbal cues required;nonverbal cues required (demo/gesture);moderate assist (50% patient effort);maximum assist (25% patient effort);2 person assist required  -BC  verbal cues required;nonverbal cues required (demo/gesture);moderate assist (50% patient effort);maximum assist (25% patient effort);2 person assist required  -BC    Bed Mobility, Safety Issues decreased use of arms for pushing/pulling;decreased use of legs for bridging/pushing  -BC  decreased use of arms for pushing/pulling;decreased use of legs for bridging/pushing  -BC    Bed Mobility, Impairments strength decreased;impaired balance;coordination impaired  -BC  strength decreased;impaired balance;coordination impaired  -BC    Recorded by [BC] Vandana Pike, PT  [BC] Vandana Pike, PT    Transfer Assessment/Treatment    Transfers, Bed-Chair [place] verbal cues required;nonverbal cues required (demo/gesture);minimum assist (75% patient effort);moderate assist (50% patient effort);2 person assist required  -BC  verbal cues required;nonverbal cues required (demo/gesture);minimum assist (75% patient effort);moderate assist (50% patient effort);2 person assist required  -BC    Transfers, Chair-Bed [place] verbal cues required;nonverbal cues required (demo/gesture);minimum assist (75% patient effort);moderate assist (50% patient effort);2 person assist required  -BC  verbal cues required;nonverbal cues required (demo/gesture);minimum assist (75% patient  effort);moderate assist (50% patient effort);2 person assist required  -BC    Transfers, Bed-Chair-Bed, Assist Device rolling walker  -BC  rolling walker  -BC    Transfers, Sit-Stand [place] verbal cues required;nonverbal cues required (demo/gesture);minimum assist (75% patient effort);moderate assist (50% patient effort);2 person assist required  -BC  verbal cues required;nonverbal cues required (demo/gesture);minimum assist (75% patient effort);moderate assist (50% patient effort);2 person assist required  -BC    Transfers, Stand-Sit [place] verbal cues required;nonverbal cues required (demo/gesture);minimum assist (75% patient effort);moderate assist (50% patient effort);2 person assist required  -BC  verbal cues required;nonverbal cues required (demo/gesture);minimum assist (75% patient effort);moderate assist (50% patient effort);2 person assist required  -BC    Transfers, Sit-Stand-Sit, Assist Device rolling walker  -BC  rolling walker  -BC    Transfer, Safety Issues balance decreased during turns;sequencing ability decreased;step length decreased;weight-shifting ability decreased  -BC  balance decreased during turns;sequencing ability decreased;step length decreased;weight-shifting ability decreased  -BC    Transfer, Impairments strength decreased;impaired balance;coordination impaired  -BC  strength decreased;impaired balance;coordination impaired  -BC    Recorded by [BC] Vandana Pike PT  [BC] Vandana Pike, PT    Gait Assessment/Treatment    Gait, [place] Level verbal cues required;nonverbal cues required (demo/gesture);moderate assist (50% patient effort);2 person assist required  -BC  verbal cues required;nonverbal cues required (demo/gesture);moderate assist (50% patient effort);2 person assist required  -BC    Gait, Assistive Device rolling walker  -BC  rolling walker  -BC    Gait, Distance (Feet) 30  -BC  25  -BC    Gait, Gait Pattern Analysis swing-to gait  -BC  swing-to gait   -BC    Gait, Impairments strength decreased;impaired balance;coordination impaired  -BC  strength decreased;impaired balance;coordination impaired  -BC    Recorded by [BC] Vandana Pike PT  [BC] Vandana Pike PT    Balance Skills Training    Sitting-Level of Assistance Contact guard  -BC  Contact guard  -BC    Sitting-Balance Support Feet supported  -BC  Feet supported  -BC    Standing-Level of Assistance Minimum assistance;Moderate assistance;x2  -BC  Minimum assistance;Moderate assistance;x2  -BC    Static Standing Balance Support assistive device  -BC  assistive device  -BC    Gait Balance-Level of Assistance Moderate assistance;x2  -BC  Moderate assistance;x2  -BC    Gait Balance Support assistive device  -BC  assistive device  -BC    Recorded by [BC] Vandana Pike PT  [BC] Vandana Pike PT    Therapy Exercises    Bilateral Lower Extremities AROM:;15 reps;sitting;ankle pumps/circles;hip flexion;LAQ  -BC  AROM:;15 reps;sitting;ankle pumps/circles;hip flexion;LAQ  -BC    Bilateral Upper Extremity  AROM:;supine;elbow flexion/extension;hand pumps;shoulder extension/flexion  -KR     Recorded by [BC] Vandana Pike PT [KR] Andres Uriarte OT [BC] Vandana Pike PT    Positioning and Restraints    Pre-Treatment Position in bed  -BC  in bed  -BC    Post Treatment Position chair  -BC  bed  -BC    In Bed   notified nsg;supine;call light within reach;encouraged to call for assist;side rails up x3  -BC    In Chair notified nsg;sitting;call light within reach;encouraged to call for assist  -BC      Recorded by [BC] Vandana Pike, PT  [BC] Vandana Pike PT      01/08/18 1750 01/08/18 1451       Rehab Assessment/Intervention    Discipline physical therapist  -AG occupational therapist  -KR     Document Type therapy note (daily note)  -AG therapy note (daily note)  -KR     Subjective Information agree to therapy   pt. is anxious  -AG agree to therapy  -KR     Patient Effort, Rehab Treatment adequate   -AG      Symptoms Noted During/After Treatment fatigue   anxiety  -AG      Precautions/Limitations fall precautions;oxygen therapy device and L/min  -AG      Patient Response to Treatment pt. responded favorably to treatment; however, continues to demonstrate some anxiety and SOB with short distance ambulation. More independent sit<>stand with RW noted today.  -AG      Recorded by [AG] Priscilla Cruz, PT [KR] Andres Uriarte, OT     Pain Assessment    Pain Assessment No/denies pain  -AG      Recorded by [AG] Priscilla Cruz, PT      Vision Assessment/Intervention    Visual Impairment WFL  -AG      Recorded by [AG] Priscilla Cruz, PT      Cognitive Assessment/Intervention    Current Cognitive/Communication Assessment functional  -AG      Orientation Status oriented x 4  -AG      Follows Commands/Answers Questions 100% of the time;able to follow single-step instructions;needs cueing;needs repetition  -AG      Personal Safety decreased awareness, need for assist;decreased awareness, need for safety  -AG      Personal Safety Interventions fall prevention program maintained;gait belt;nonskid shoes/slippers when out of bed;supervised activity  -AG      Recorded by [AG] Priscilla Cruz, PT      Mobility Assessment/Training    Extremity Weight-Bearing Status --   no weightbearing restrictions  -AG      Recorded by [AG] Priscilla Cruz, PT      Bed Mobility, Assessment/Treatment    Bed Mobility, Assistive Device bed rails;draw sheet  -AG      Bed Mobility, Scoot/Bridge, [place] verbal cues required;nonverbal cues required (demo/gesture);moderate assist (50% patient effort);maximum assist (25% patient effort);2 person assist required  -AG      Bed Mob, Supine to Sit, [place] verbal cues required;nonverbal cues required (demo/gesture);moderate assist (50% patient effort);maximum assist (25% patient effort);2 person assist required  -AG      Bed Mobility, Safety Issues decreased use of arms for pushing/pulling;decreased use of  legs for bridging/pushing  -AG      Bed Mobility, Impairments strength decreased;impaired balance;coordination impaired  -AG      Recorded by [AG] Priscilla Cruz, PT      Transfer Assessment/Treatment    Transfers, Bed-Chair [place] verbal cues required;nonverbal cues required (demo/gesture);minimum assist (75% patient effort);moderate assist (50% patient effort);2 person assist required  -AG      Transfers, Chair-Bed [place] verbal cues required;nonverbal cues required (demo/gesture);minimum assist (75% patient effort);moderate assist (50% patient effort);2 person assist required  -AG      Transfers, Bed-Chair-Bed, Assist Device rolling walker  -AG      Transfers, Sit-Stand [place] verbal cues required;nonverbal cues required (demo/gesture);minimum assist (75% patient effort);moderate assist (50% patient effort);2 person assist required  -AG      Transfers, Stand-Sit [place] verbal cues required;nonverbal cues required (demo/gesture);minimum assist (75% patient effort);moderate assist (50% patient effort);2 person assist required  -AG      Transfers, Sit-Stand-Sit, Assist Device rolling walker  -AG      Transfer, Safety Issues balance decreased during turns;sequencing ability decreased;step length decreased;weight-shifting ability decreased  -AG      Transfer, Impairments strength decreased;impaired balance;coordination impaired  -AG      Recorded by [AG] Priscilla Cruz, PT      Gait Assessment/Treatment    Gait, [place] Level verbal cues required;nonverbal cues required (demo/gesture);moderate assist (50% patient effort);2 person assist required  -AG      Gait, Assistive Device rolling walker  -AG      Gait, Distance (Feet) 12  -AG      Gait, Gait Pattern Analysis swing-to gait  -AG      Gait, Gait Deviations [place] decreased;decreased heel strike;forward flexed posture;step length decreased;weight-shifting ability decreased  -AG      Gait, Safety Issues balance decreased during  turns;sequencing ability decreased;step length decreased;weight-shifting ability decreased;supplemental O2  -AG      Gait, Impairments strength decreased;impaired balance;coordination impaired  -AG      Recorded by [AG] Priscilla Cruz, PT      Motor Skills/Interventions    Additional Documentation Balance Skills Training (Group)  -AG      Recorded by [AG] Priscilla Cruz, PT      Balance Skills Training    Sitting-Level of Assistance Contact guard  -AG      Sitting-Balance Support Feet supported  -AG      Standing-Level of Assistance Minimum assistance;Moderate assistance;x2  -AG      Static Standing Balance Support assistive device  -AG      Gait Balance-Level of Assistance Moderate assistance;x2  -AG      Gait Balance Support assistive device  -AG      Recorded by [AG] Priscilla Cruz, PT      Therapy Exercises    Bilateral Lower Extremities AROM:;15 reps;sitting;ankle pumps/circles;hip flexion;LAQ  -AG      Bilateral Upper Extremity  AAROM:;sitting;hand pumps;shoulder extension/flexion;10 reps  -KR     Recorded by [AG] Priscilla Cruz, PT [KR] Andres Uriarte, OT     Positioning and Restraints    Pre-Treatment Position in bed  -AG      Post Treatment Position chair  -AG      In Chair sitting;call light within reach;encouraged to call for assist;with family/caregiver  -AG      Recorded by [AG] Priscilla Cruz, PT        User Key  (r) = Recorded By, (t) = Taken By, (c) = Cosigned By    Initials Name Effective Dates    BC Vandana Pike, PT 03/14/16 -     AG Priscilla Cruz, PT 03/14/16 -     KR Andres Uriarte, OT 03/14/16 -                 OT Goals       01/05/18 1509          Strength OT LTG    Strength Goal OT LTG, Date Established 01/05/18  -KR      Strength Goal OT LTG, Time to Achieve by discharge  -KR      Strength Goal OT LTG, Measure to Achieve BUE increase x 1 to enhance self care/mobility  -KR      ADL OT LTG    ADL OT LTG, Date Established 01/05/18  -KR      ADL OT LTG, Time to Achieve by discharge  -KR      ADL OT  LTG, Activity Type ADL skills  -KR      ADL OT LTG, [place] Level min assist  -KR        User Key  (r) = Recorded By, (t) = Taken By, (c) = Cosigned By    Initials Name Provider Type    KARLO Uriarte OT Occupational Therapist                OT Recommendation and Plan  Planned Therapy Interventions: activity intolerance, ADL retraining, strengthening  Therapy Frequency: 3-5 times/wk           Outcome Measures       01/10/18 1700 01/09/18 1600       How much help from another person do you currently need...    Turning from your back to your side while in flat bed without using bedrails? 3  -BC 2  -BC     Moving from lying on back to sitting on the side of a flat bed without bedrails? 3  -BC 2  -BC     Moving to and from a bed to a chair (including a wheelchair)? 2  -BC 2  -BC     Standing up from a chair using your arms (e.g., wheelchair, bedside chair)? 2  -BC 2  -BC     Climbing 3-5 steps with a railing? 2  -BC 1  -BC     To walk in hospital room? 3  -BC 2  -BC     AM-PAC 6 Clicks Score 15  -BC 11  -BC     Functional Assessment    Outcome Measure Options AM-PAC 6 Clicks Basic Mobility (PT)  -BC AM-PAC 6 Clicks Basic Mobility (PT)  -BC       User Key  (r) = Recorded By, (t) = Taken By, (c) = Cosigned By    Initials Name Provider Type    WHITNEY Pike, PT Physical Therapist           Time Calculation:        Therapy Charges for Today     Code Description Service Date Service Provider Modifiers Qty    [id number]  OT THERAPEUTIC ACT EA 15 MIN 1/10/2018 Andres Uriarte OT GO 1          OT G-codes  Functional Limitation: Self care  Self Care Current Status (): At least 60 percent but less than 80 percent impaired, limited or restricted  Self Care Goal Status (): At least 20 percent but less than 40 percent impaired, limited or restricted    Andres Uriarte OT  1/11/2018

## 2018-01-11 NOTE — THERAPY EVALUATION
Acute Care - Physical Therapy Treatment Note   [place]     Patient Name: Tre Kinney  : 1932  MRN: [id number]  Today's Date: 2018  Onset of Illness/Injury or Date of Surgery Date: 18 (swing bed admit date)  Date of Referral to PT: 18       Admit Date: 2018    Visit Dx:  No diagnosis found.  Patient Active Problem List   Diagnosis   • Acid reflux   • Ankle arthralgia   • Cardiac conduction disorder   • Closed fracture of distal fibula   • Arteriosclerosis of coronary artery   • Fracture of distal end of tibia   • Elevated cholesterol   • BP (high blood pressure)   • Hypertrophic polyarthritis   • OP (osteoporosis)   • Right heart failure   • Heart failure, chronic, right-sided   • SOB (shortness of breath)   • Palpitations   • Acute CHF   • CHF (congestive heart failure)   • Congestive heart failure   • Precordial pain               Adult Rehabilitation Note       18 1514 18 1307 01/10/18 1600    Rehab Assessment/Intervention    Discipline physical therapist  -BC occupational therapist  -KR physical therapist  -BC    Document Type therapy note (daily note)  -BC therapy note (daily note)  -KR therapy note (daily note)  -BC    Subjective Information agree to therapy  -BC agree to therapy  -KR no complaints;agree to therapy  -BC    Patient Effort, Rehab Treatment good  -BC good  -KR good  -BC    Recorded by [BC] Vandana Pike, PT [KR] Andres Uriarte, OT [BC] Vandana Pike, PT    Pain Assessment    Pain Assessment No/denies pain  -BC  No/denies pain  -BC    Recorded by [BC] Vandana Pike, PT  [BC] Vandana Pike, PT    Vision Assessment/Intervention    Visual Impairment WFL  -BC  WFL  -BC    Recorded by [BC] Vandana Pike, PT  [BC] Vandana Pike, PT    Cognitive Assessment/Intervention    Current Cognitive/Communication Assessment functional  -BC  functional  -BC    Orientation Status oriented x 4  -BC  oriented x 4  -BC    Follows Commands/Answers Questions 100%  of the time;able to follow single-step instructions  -BC      Personal Safety decreased awareness, need for assist;decreased awareness, need for safety;decreased insight to deficits  -BC      Personal Safety Interventions fall prevention program maintained;gait belt;muscle strengthening facilitated;nonskid shoes/slippers when out of bed  -BC      Recorded by [BC] Vandana Pike PT  [BC] Vandana Pike PT    Bed Mobility, Assessment/Treatment    Bed Mobility, Assistive Device bed rails;draw sheet  -BC  bed rails;draw sheet  -BC    Bed Mobility, Scoot/Bridge, [place] verbal cues required;nonverbal cues required (demo/gesture);moderate assist (50% patient effort);maximum assist (25% patient effort);2 person assist required  -BC  verbal cues required;nonverbal cues required (demo/gesture);moderate assist (50% patient effort);maximum assist (25% patient effort);2 person assist required  -BC    Bed Mob, Supine to Sit, [place] verbal cues required;nonverbal cues required (demo/gesture);moderate assist (50% patient effort);maximum assist (25% patient effort);2 person assist required  -BC  verbal cues required;nonverbal cues required (demo/gesture);moderate assist (50% patient effort);maximum assist (25% patient effort);2 person assist required  -BC    Bed Mobility, Safety Issues decreased use of arms for pushing/pulling;decreased use of legs for bridging/pushing  -BC  decreased use of arms for pushing/pulling;decreased use of legs for bridging/pushing  -BC    Bed Mobility, Impairments strength decreased;impaired balance;coordination impaired  -BC  strength decreased;impaired balance;coordination impaired  -BC    Recorded by [BC] Vandana Pike, PT  [BC] Vandana Pike PT    Transfer Assessment/Treatment    Transfers, Bed-Chair [place] verbal cues required;nonverbal cues required (demo/gesture);minimum assist (75% patient effort);moderate assist (50% patient effort);2 person assist required  -BC   verbal cues required;nonverbal cues required (demo/gesture);minimum assist (75% patient effort);moderate assist (50% patient effort);2 person assist required  -BC    Transfers, Chair-Bed [place] verbal cues required;nonverbal cues required (demo/gesture);minimum assist (75% patient effort);moderate assist (50% patient effort);2 person assist required  -BC  verbal cues required;nonverbal cues required (demo/gesture);minimum assist (75% patient effort);moderate assist (50% patient effort);2 person assist required  -BC    Transfers, Bed-Chair-Bed, Assist Device rolling walker  -BC  rolling walker  -BC    Transfers, Sit-Stand [place] verbal cues required;nonverbal cues required (demo/gesture);minimum assist (75% patient effort);moderate assist (50% patient effort);2 person assist required  -BC  verbal cues required;nonverbal cues required (demo/gesture);minimum assist (75% patient effort);moderate assist (50% patient effort);2 person assist required  -BC    Transfers, Stand-Sit [place] verbal cues required;nonverbal cues required (demo/gesture);minimum assist (75% patient effort);moderate assist (50% patient effort);2 person assist required  -BC  verbal cues required;nonverbal cues required (demo/gesture);minimum assist (75% patient effort);moderate assist (50% patient effort);2 person assist required  -BC    Transfers, Sit-Stand-Sit, Assist Device rolling walker  -BC  rolling walker  -BC    Transfer, Safety Issues balance decreased during turns;sequencing ability decreased;step length decreased;weight-shifting ability decreased  -BC  balance decreased during turns;sequencing ability decreased;step length decreased;weight-shifting ability decreased  -BC    Transfer, Impairments strength decreased;impaired balance;coordination impaired  -BC  strength decreased;impaired balance;coordination impaired  -BC    Recorded by [BC] Vandana Pike, PT  [BC] Vandana Pike PT    Gait Assessment/Treatment     Gait, [place] Level verbal cues required;nonverbal cues required (demo/gesture);moderate assist (50% patient effort);2 person assist required  -BC  verbal cues required;nonverbal cues required (demo/gesture);moderate assist (50% patient effort);2 person assist required  -BC    Gait, Assistive Device rolling walker  -BC  rolling walker  -BC    Gait, Distance (Feet) 45  -BC  30  -BC    Gait, Gait Pattern Analysis swing-to gait  -BC  swing-to gait  -BC    Gait, Impairments strength decreased;impaired balance;coordination impaired  -BC  strength decreased;impaired balance;coordination impaired  -BC    Recorded by [BC] Vandana Pike PT  [BC] Vandana Pike PT    Balance Skills Training    Sitting-Level of Assistance Contact guard  -BC  Contact guard  -BC    Sitting-Balance Support Feet supported  -BC  Feet supported  -BC    Standing-Level of Assistance Minimum assistance;Moderate assistance;x2  -BC  Minimum assistance;Moderate assistance;x2  -BC    Static Standing Balance Support assistive device  -BC  assistive device  -BC    Gait Balance-Level of Assistance Moderate assistance;x2  -BC  Moderate assistance;x2  -BC    Gait Balance Support assistive device  -BC  assistive device  -BC    Recorded by [BC] Vandana Pike PT  [BC] Vandana Pike PT    Therapy Exercises    Bilateral Lower Extremities AROM:;15 reps;sitting;ankle pumps/circles;hip flexion;LAQ  -BC  AROM:;15 reps;sitting;ankle pumps/circles;hip flexion;LAQ  -BC    Bilateral Upper Extremity  AROM:;supine;elbow flexion/extension;hand pumps;shoulder extension/flexion  -KR     Recorded by [BC] Vandana Pike PT [KR] Andres Uriarte OT [BC] Vandana Pike PT    Positioning and Restraints    Pre-Treatment Position in bed  -BC  in bed  -BC    Post Treatment Position bed  -BC  chair  -BC    In Bed notified nsg;supine;call light within reach;encouraged to call for assist;side rails up x3  -BC      In Chair   notified nsg;sitting;call light within  reach;encouraged to call for assist  -BC    Recorded by [BC] Vandana Pike, PT  [BC] Vandana Pike, PT      01/10/18 1457 01/09/18 1655 01/08/18 1750    Rehab Assessment/Intervention    Discipline occupational therapist  -KR physical therapist  -BC physical therapist  -AG    Document Type therapy note (daily note)  -KR therapy note (daily note)  -BC therapy note (daily note)  -AG    Subjective Information  no complaints;agree to therapy  -BC agree to therapy   pt. is anxious  -AG    Patient Effort, Rehab Treatment adequate  -KR adequate  -BC adequate  -AG    Symptoms Noted During/After Treatment   fatigue   anxiety  -AG    Precautions/Limitations   fall precautions;oxygen therapy device and L/min  -AG    Patient Response to Treatment   pt. responded favorably to treatment; however, continues to demonstrate some anxiety and SOB with short distance ambulation. More independent sit<>stand with RW noted today.  -AG    Recorded by [KR] Andres Uriarte, OT [BC] Vandana Pike, PT [AG] Priscilla Cruz, PT    Pain Assessment    Pain Assessment   No/denies pain  -AG    Recorded by   [AG] Priscilla Cruz, PT    Vision Assessment/Intervention    Visual Impairment  WFL  -BC WFL  -AG    Recorded by  [BC] Vandana Pike, PT [AG] Priscilla Cruz, PT    Cognitive Assessment/Intervention    Current Cognitive/Communication Assessment  functional  -BC functional  -AG    Orientation Status  oriented x 4  -BC oriented x 4  -AG    Follows Commands/Answers Questions   100% of the time;able to follow single-step instructions;needs cueing;needs repetition  -AG    Personal Safety   decreased awareness, need for assist;decreased awareness, need for safety  -AG    Personal Safety Interventions   fall prevention program maintained;gait belt;nonskid shoes/slippers when out of bed;supervised activity  -AG    Recorded by  [BC] Vandana Pike, PT [AG] Priscilla Cruz, PT    Mobility Assessment/Training    Extremity Weight-Bearing Status   --   no  weightbearing restrictions  -AG    Recorded by   [AG] Priscilla Cruz, PT    Bed Mobility, Assessment/Treatment    Bed Mobility, Assistive Device  bed rails;draw sheet  -BC bed rails;draw sheet  -AG    Bed Mobility, Scoot/Bridge, [place]  verbal cues required;nonverbal cues required (demo/gesture);moderate assist (50% patient effort);maximum assist (25% patient effort);2 person assist required  -BC verbal cues required;nonverbal cues required (demo/gesture);moderate assist (50% patient effort);maximum assist (25% patient effort);2 person assist required  -AG    Bed Mob, Supine to Sit, [place]  verbal cues required;nonverbal cues required (demo/gesture);moderate assist (50% patient effort);maximum assist (25% patient effort);2 person assist required  -BC verbal cues required;nonverbal cues required (demo/gesture);moderate assist (50% patient effort);maximum assist (25% patient effort);2 person assist required  -AG    Bed Mobility, Safety Issues  decreased use of arms for pushing/pulling;decreased use of legs for bridging/pushing  -BC decreased use of arms for pushing/pulling;decreased use of legs for bridging/pushing  -AG    Bed Mobility, Impairments  strength decreased;impaired balance;coordination impaired  -BC strength decreased;impaired balance;coordination impaired  -AG    Recorded by  [BC] Vandana Pike, PT [AG] Priscilla Cruz, PT    Transfer Assessment/Treatment    Transfers, Bed-Chair [place]  verbal cues required;nonverbal cues required (demo/gesture);minimum assist (75% patient effort);moderate assist (50% patient effort);2 person assist required  -BC verbal cues required;nonverbal cues required (demo/gesture);minimum assist (75% patient effort);moderate assist (50% patient effort);2 person assist required  -AG    Transfers, Chair-Bed [place]  verbal cues required;nonverbal cues required (demo/gesture);minimum assist (75% patient effort);moderate assist (50% patient effort);2 person  assist required  -BC verbal cues required;nonverbal cues required (demo/gesture);minimum assist (75% patient effort);moderate assist (50% patient effort);2 person assist required  -AG    Transfers, Bed-Chair-Bed, Assist Device  rolling walker  -BC rolling walker  -AG    Transfers, Sit-Stand [place]  verbal cues required;nonverbal cues required (demo/gesture);minimum assist (75% patient effort);moderate assist (50% patient effort);2 person assist required  -BC verbal cues required;nonverbal cues required (demo/gesture);minimum assist (75% patient effort);moderate assist (50% patient effort);2 person assist required  -AG    Transfers, Stand-Sit [place]  verbal cues required;nonverbal cues required (demo/gesture);minimum assist (75% patient effort);moderate assist (50% patient effort);2 person assist required  -BC verbal cues required;nonverbal cues required (demo/gesture);minimum assist (75% patient effort);moderate assist (50% patient effort);2 person assist required  -AG    Transfers, Sit-Stand-Sit, Assist Device  rolling walker  -BC rolling walker  -AG    Transfer, Safety Issues  balance decreased during turns;sequencing ability decreased;step length decreased;weight-shifting ability decreased  -BC balance decreased during turns;sequencing ability decreased;step length decreased;weight-shifting ability decreased  -AG    Transfer, Impairments  strength decreased;impaired balance;coordination impaired  -BC strength decreased;impaired balance;coordination impaired  -AG    Recorded by  [BC] Vandana Pike, PT [AG] Priscilla Cruz, PT    Gait Assessment/Treatment    Gait, [place] Level  verbal cues required;nonverbal cues required (demo/gesture);moderate assist (50% patient effort);2 person assist required  -BC verbal cues required;nonverbal cues required (demo/gesture);moderate assist (50% patient effort);2 person assist required  -AG    Gait, Assistive Device  rolling walker  -BC rolling walker  -AG     Gait, Distance (Feet)  25  -BC 12  -AG    Gait, Gait Pattern Analysis  swing-to gait  -BC swing-to gait  -AG    Gait, Gait Deviations   calvin decreased;decreased heel strike;forward flexed posture;step length decreased;weight-shifting ability decreased  -AG    Gait, Safety Issues   balance decreased during turns;sequencing ability decreased;step length decreased;weight-shifting ability decreased;supplemental O2  -AG    Gait, Impairments  strength decreased;impaired balance;coordination impaired  -BC strength decreased;impaired balance;coordination impaired  -AG    Recorded by  [BC] Vandana Pike, PT [AG] Priscilla Cruz, PT    Motor Skills/Interventions    Additional Documentation   Balance Skills Training (Group)  -AG    Recorded by   [AG] Priscilla Cruz PT    Balance Skills Training    Sitting-Level of Assistance  Contact guard  -BC Contact guard  -AG    Sitting-Balance Support  Feet supported  -BC Feet supported  -AG    Standing-Level of Assistance  Minimum assistance;Moderate assistance;x2  -BC Minimum assistance;Moderate assistance;x2  -AG    Static Standing Balance Support  assistive device  -BC assistive device  -AG    Gait Balance-Level of Assistance  Moderate assistance;x2  -BC Moderate assistance;x2  -AG    Gait Balance Support  assistive device  -BC assistive device  -AG    Recorded by  [BC] Vandana Pike, PT [AG] Priscilla Cruz, PT    Therapy Exercises    Bilateral Lower Extremities  AROM:;15 reps;sitting;ankle pumps/circles;hip flexion;LAQ  -BC AROM:;15 reps;sitting;ankle pumps/circles;hip flexion;LAQ  -AG    Bilateral Upper Extremity AROM:;supine;elbow flexion/extension;hand pumps;shoulder extension/flexion  -KR      Recorded by [KR] Andres Uriarte OT [BC] Vandana Pike, PT [AG] Priscilla Cruz, PT    Positioning and Restraints    Pre-Treatment Position  in bed  -BC in bed  -AG    Post Treatment Position  bed  -BC chair  -AG    In Bed  notified nsg;supine;call light within reach;encouraged to call  for assist;side rails up x3  -BC     In Chair   sitting;call light within reach;encouraged to call for assist;with family/caregiver  -AG    Recorded by  [BC] Vandana Pike, PT [AG] Priscilla Cruz, PT      User Key  (r) = Recorded By, (t) = Taken By, (c) = Cosigned By    Initials Name Effective Dates    BC Vandana Pike, PT 03/14/16 -     AG Priscilla Cruz, PT 03/14/16 -     KR Andres Uriarte, OT 03/14/16 -                 IP PT Goals       01/05/18 1712 01/02/18 1727       Bed Mobility PT LTG    Bed Mobility PT LTG, Date Established 01/05/18  -CT 01/02/18  -BC     Bed Mobility PT LTG, Time to Achieve by discharge  -CT by discharge  -BC     Bed Mobility PT LTG, Activity Type all bed mobility  -CT all bed mobility  -BC     Bed Mobility PT LTG, [place] Level minimum assist (75% patient effort);moderate assist (50% patient effort)  -CT minimum assist (75% patient effort)  -BC     Bed Mobility PT Goal  LTG, Assist Device bed rails  -CT bed rails  -BC     Transfer Training PT LTG    Transfer Training PT LTG, Date Established 01/05/18  -CT 01/02/18  -BC     Transfer Training PT LTG, Time to Achieve by discharge  -CT by discharge  -BC     Transfer Training PT LTG, Activity Type bed to chair /chair to bed;sit to stand/stand to sit  -CT all transfers  -BC     Transfer Training PT LTG, [place] Level moderate assist (50% patient effort)  -CT minimum assist (75% patient effort)  -BC     Transfer Training PT LTG, Assist Device other (see comments)   with appropriate AD  -CT walker, rolling  -BC     Gait Training PT LTG    Gait Training Goal PT LTG, Date Established 01/05/18  -CT 01/02/18  -BC     Gait Training Goal PT LTG, Time to Achieve by discharge  -CT by discharge  -BC     Gait Training Goal PT LTG, [place] Level moderate assist (50% patient effort);2 person assist required  -CT minimum assist (75% patient effort);moderate assist (50% patient effort)  -BC     Gait Training Goal PT LTG, Assist Device other  (see comments)   with appropriate AD  -CT walker, rolling  -BC     Gait Training Goal PT LTG, Distance to Achieve 25  -CT 20  -BC       User Key  (r) = Recorded By, (t) = Taken By, (c) = Cosigned By    Initials Name Provider Type    BC Vandana Pike, PT Physical Therapist    CT Bhargavi Vásquez, PT Physical Therapist          Physical Therapy Education     Title: PT OT SLP Therapies (Done)     Topic: Physical Therapy (Done)     Point: Mobility training (Done)    Learning Progress Summary    Learner Readiness Method Response Comment Documented by Status   Patient Acceptance E VU  BC 01/10/18 1645 Done    Acceptance E NR  BW 01/10/18 1407 Active    Acceptance E VU  BC 01/09/18 1657 Done    Acceptance E VU  TD 01/09/18 1611 Done    Acceptance E,D NR  AG 01/08/18 1757 Active    Acceptance E VU  DM 01/08/18 1747 Done    Acceptance E VU  DM 01/07/18 1103 Done    Acceptance E VU  BC 01/06/18 1122 Done    Acceptance E VU  CT 01/05/18 1713 Done               Point: Home exercise program (Done)    Learning Progress Summary    Learner Readiness Method Response Comment Documented by Status   Patient Acceptance E VU  BC 01/10/18 1645 Done    Acceptance E NR  BW 01/10/18 1407 Active    Acceptance E VU  BC 01/09/18 1657 Done    Acceptance E VU  TD 01/09/18 1611 Done    Acceptance E,D NR  AG 01/08/18 1757 Active    Acceptance E VU  DM 01/08/18 1747 Done    Acceptance E VU  DM 01/07/18 1103 Done    Acceptance E VU  BC 01/06/18 1122 Done    Acceptance E VU  CT 01/05/18 1713 Done               Point: Body mechanics (Done)    Learning Progress Summary    Learner Readiness Method Response Comment Documented by Status   Patient Acceptance E VU  BC 01/10/18 1645 Done    Acceptance E NR  BW 01/10/18 1407 Active    Acceptance E VU  BC 01/09/18 1657 Done    Acceptance E VU  TD 01/09/18 1611 Done    Acceptance E,D NR  AG 01/08/18 1757 Active    Acceptance E VU  DM 01/08/18 1747 Done    Acceptance E VU  DM 01/07/18 1103 Done    Acceptance E  VU  BC 01/06/18 1122 Done    Acceptance E VU  CT 01/05/18 1713 Done               Point: Precautions (Done)    Learning Progress Summary    Learner Readiness Method Response Comment Documented by Status   Patient Acceptance E VU  BC 01/10/18 1645 Done    Acceptance E NR  BW 01/10/18 1407 Active    Acceptance E VU  BC 01/09/18 1657 Done    Acceptance E VU  TD 01/09/18 1611 Done    Acceptance E,D NR  AG 01/08/18 1757 Active    Acceptance E VU  DM 01/08/18 1747 Done    Acceptance E VU  DM 01/07/18 1103 Done    Acceptance E VU  BC 01/06/18 1122 Done    Acceptance E VU  CT 01/05/18 1713 Done                      User Key     Initials Effective Dates Name Provider Type Discipline    TD 06/16/16 -  Yan Sterling, RN Registered Nurse Nurse    DM 06/16/16 -  Nehal Oneal, RN Registered Nurse Nurse     06/16/16 -  Di Bowen, RN Registered Nurse Nurse    BC 03/14/16 -  Vandana Pike, PT Physical Therapist PT    AG 03/14/16 -  Priscilla Cruz, PT Physical Therapist PT    CT 03/14/16 -  Bhargavi Vásquez, PT Physical Therapist PT                    PT Recommendation and Plan  Anticipated Equipment Needs At Discharge: front wheeled walker, bedside commode  Anticipated Discharge Disposition: home with 24/7 care, skilled nursing facility  Planned Therapy Interventions: balance training, bed mobility training, gait training, home exercise program, manual therapy techniques, neuromuscular re-education, patient/family education, strengthening, transfer training  PT Frequency: 5 times/wk, per priority policy             Outcome Measures       01/11/18 1500 01/10/18 1700 01/09/18 1600    How much help from another person do you currently need...    Turning from your back to your side while in flat bed without using bedrails? 3  -BC 3  -BC 2  -BC    Moving from lying on back to sitting on the side of a flat bed without bedrails? 3  -BC 3  -BC 2  -BC    Moving to and from a bed to a chair (including a wheelchair)? 2  -BC 2   -BC 2  -BC    Standing up from a chair using your arms (e.g., wheelchair, bedside chair)? 2  -BC 2  -BC 2  -BC    Climbing 3-5 steps with a railing? 2  -BC 2  -BC 1  -BC    To walk in hospital room? 3  -BC 3  -BC 2  -BC    AM-PAC 6 Clicks Score 15  -BC 15  -BC 11  -BC    Functional Assessment    Outcome Measure Options AM-PAC 6 Clicks Basic Mobility (PT)  -BC AM-PAC 6 Clicks Basic Mobility (PT)  -BC AM-PAC 6 Clicks Basic Mobility (PT)  -BC      User Key  (r) = Recorded By, (t) = Taken By, (c) = Cosigned By    Initials Name Provider Type    BC Vandana Pike PT Physical Therapist           Time Calculation:         PT Charges       01/11/18 1517          Time Calculation    Start Time --   30  -BC      PT Received On 01/11/18  -BC        User Key  (r) = Recorded By, (t) = Taken By, (c) = Cosigned By    Initials Name Provider Type    BC Vandana Pike PT Physical Therapist          Therapy Charges for Today     Code Description Service Date Service Provider Modifiers Qty    48028500166 HC GAIT TRAINING EA 15 MIN 1/10/2018 Vandana Pike, PT GP 1    43834998280 HC PT THERAPEUTIC ACT EA 15 MIN 1/10/2018 Vandana Pike, PT GP 1    94191976121 HC PT THER SUPP EA 15 MIN 1/10/2018 Vandana Pike, PT GP 2    46367629622 HC GAIT TRAINING EA 15 MIN 1/11/2018 Vandana Pike, PT GP 1    70706980249 HC PT THERAPEUTIC ACT EA 15 MIN 1/11/2018 Vandana Pike, PT GP 1    [id number] HC PT THER SUPP EA 15 MIN 1/11/2018 Vandana Pike, PT GP 2          PT G-Codes  Outcome Measure Options: AM-PAC 6 Clicks Basic Mobility (PT)  Score: 10  Functional Limitation: Mobility: Walking and moving around  Mobility: Walking and Moving Around Current Status (): At least 60 percent but less than 80 percent impaired, limited or restricted  Mobility: Walking and Moving Around Goal Status (): At least 40 percent but less than 60 percent impaired, limited or restricted    Vandana Pike PT  1/11/2018

## 2018-01-11 NOTE — PLAN OF CARE
Problem: Fall Risk (Adult)  Goal: Identify Related Risk Factors and Signs and Symptoms  Outcome: Ongoing (interventions implemented as appropriate)    Goal: Absence of Falls  Outcome: Ongoing (interventions implemented as appropriate)    Goal: Identify Related Risk Factors and Signs and Symptoms  Outcome: Ongoing (interventions implemented as appropriate)    Goal: Absence of Falls  Outcome: Ongoing (interventions implemented as appropriate)      Problem: Fluid Volume Excess (Adult,Obstetrics,Pediatric)  Goal: Identify Related Risk Factors and Signs and Symptoms  Outcome: Ongoing (interventions implemented as appropriate)    Goal: Stable Weight  Outcome: Ongoing (interventions implemented as appropriate)    Goal: Balanced Intake/Output  Outcome: Ongoing (interventions implemented as appropriate)

## 2018-01-12 ENCOUNTER — EPISODE CHANGES (OUTPATIENT)
Dept: CASE MANAGEMENT | Facility: OTHER | Age: 83
End: 2018-01-12

## 2018-01-12 VITALS
RESPIRATION RATE: 18 BRPM | HEART RATE: 76 BPM | SYSTOLIC BLOOD PRESSURE: 145 MMHG | WEIGHT: 179.2 LBS | BODY MASS INDEX: 28.12 KG/M2 | TEMPERATURE: 97.8 F | HEIGHT: 67 IN | OXYGEN SATURATION: 98 % | DIASTOLIC BLOOD PRESSURE: 82 MMHG

## 2018-01-12 LAB
ANION GAP SERPL CALCULATED.3IONS-SCNC: 8.3 MMOL/L (ref 3.6–11.2)
BUN BLD-MCNC: 23 MG/DL (ref 7–21)
BUN/CREAT SERPL: 13.8 (ref 7–25)
CALCIUM SPEC-SCNC: 8.5 MG/DL (ref 7.7–10)
CHLORIDE SERPL-SCNC: 103 MMOL/L (ref 99–112)
CO2 SERPL-SCNC: 27.7 MMOL/L (ref 24.3–31.9)
CREAT BLD-MCNC: 1.67 MG/DL (ref 0.43–1.29)
GFR SERPL CREATININE-BSD FRML MDRD: 29 ML/MIN/1.73
GLUCOSE BLD-MCNC: 91 MG/DL (ref 70–110)
INR PPP: 3.08 (ref 0.9–1.1)
OSMOLALITY SERPL CALC.SUM OF ELEC: 280.8 MOSM/KG (ref 273–305)
POTASSIUM BLD-SCNC: 4 MMOL/L (ref 3.5–5.3)
PROTHROMBIN TIME: 32.3 SECONDS (ref 11–15.4)
SODIUM BLD-SCNC: 139 MMOL/L (ref 135–153)

## 2018-01-12 PROCEDURE — 94799 UNLISTED PULMONARY SVC/PX: CPT

## 2018-01-12 PROCEDURE — 85610 PROTHROMBIN TIME: CPT | Performed by: FAMILY MEDICINE

## 2018-01-12 PROCEDURE — 80048 BASIC METABOLIC PNL TOTAL CA: CPT | Performed by: FAMILY MEDICINE

## 2018-01-12 PROCEDURE — 94640 AIRWAY INHALATION TREATMENT: CPT

## 2018-01-12 RX ADMIN — FUROSEMIDE 40 MG: 40 TABLET ORAL at 08:07

## 2018-01-12 RX ADMIN — SPIRONOLACTONE 25 MG: 25 TABLET ORAL at 08:07

## 2018-01-12 RX ADMIN — PANTOPRAZOLE SODIUM 40 MG: 40 TABLET, DELAYED RELEASE ORAL at 08:07

## 2018-01-12 RX ADMIN — ALPRAZOLAM 0.5 MG: 0.5 TABLET ORAL at 00:40

## 2018-01-12 RX ADMIN — CASTOR OIL AND BALSAM, PERU: 788; 87 OINTMENT TOPICAL at 08:11

## 2018-01-12 RX ADMIN — IPRATROPIUM BROMIDE AND ALBUTEROL SULFATE 3 ML: .5; 3 SOLUTION RESPIRATORY (INHALATION) at 08:51

## 2018-01-12 RX ADMIN — CITALOPRAM HYDROBROMIDE 20 MG: 20 TABLET ORAL at 08:07

## 2018-01-12 RX ADMIN — METOPROLOL SUCCINATE 100 MG: 50 TABLET, FILM COATED, EXTENDED RELEASE ORAL at 08:07

## 2018-01-12 RX ADMIN — CHOLECALCIFEROL CAP 125 MCG (5000 UNIT) 5000 UNITS: 125 CAP at 08:06

## 2018-01-12 RX ADMIN — ASPIRIN 81 MG: 81 TABLET ORAL at 08:07

## 2018-01-12 RX ADMIN — HYDRALAZINE HYDROCHLORIDE 25 MG: 25 TABLET ORAL at 08:06

## 2018-01-12 RX ADMIN — ISOSORBIDE MONONITRATE 60 MG: 60 TABLET, EXTENDED RELEASE ORAL at 08:06

## 2018-01-12 NOTE — PROGRESS NOTES
Discharge Planning Assessment   [place]     Patient Name: Tre Kinney  MRN: [id number]  Today's Date: 1/12/2018    Admit Date: 1/5/2018       Discharge Plan       01/12/18 0922    Final Note    Final Note Pt is being discharged home from swing bed on this date. Pt does not utilize home health services. Pt has a hospital bed, rolling walker, bedside commode and home oxygen @ 2 liters from [facility]. Pt denies additional needs. No other needs identified.           Expected Discharge Date and Time     Expected Discharge Date Expected Discharge Time    Jan 12, 2018                Erin Quevedo

## 2018-01-12 NOTE — PLAN OF CARE
Problem: Patient Care Overview (Adult)  Goal: Plan of Care Review   01/12/18 0517   Coping/Psychosocial Response Interventions   Plan Of Care Reviewed With patient   Patient Care Overview   Progress improving

## 2018-01-12 NOTE — DISCHARGE PLACEMENT REQUEST
"Tre Kinney (85 y.o. Female)     Date of Birth Social Security Number Address Home Phone MRN    1932  [address] [facility] [address] [phone] [id number]    Latter day Marital Status          None        Admission Date Admission Type Admitting Provider Attending Provider Department, Room/Bed    18 Elective Tin Mcgowan MD  [address], 3344/2S    Discharge Date Discharge Disposition Discharge Destination        2018 Home or Self Care             Attending Provider: (none)    Allergies:  Latex    Isolation:  None   Infection:  None   Code Status:  FULL    Ht:  170.2 cm (67\")   Wt:  81.3 kg (179 lb 3.2 oz)    Admission Cmt:  None   Principal Problem:  None                Active Insurance as of 2018     Primary Coverage     Payor Plan Insurance Group Employer/Plan Group    MEDICARE RAILROAD MEDICARE      Payor Plan Address Payor Plan Phone Number Effective From Effective To    [address] [phone] 1997     [place] [address]       Subscriber Name Subscriber Birth Date Member ID       TRE KINNEY 1932 [id number]                 Emergency Contacts      (Rel.) Home Phone Work Phone Mobile Phone    Valdez Kinney (Son) -- -- [phone]    Phillip Kinney (Son) -- -- [phone]        [address] [address]  Phone:  [phone]  Fax:   Date: 2018      Ambulatory Referral to Home Health     Patient:  Tre Kinney MRN:  [id number]   [address] [facility] [address] :  1932  SSN:    Phone: [phone] Sex:  F      INSURANCE PAYOR PLAN GROUP # SUBSCRIBER ID   Primary: MEDICARE [id number]   [id number]      Referring Provider Information:  TIN MCGOWAN Phone: [phone] Fax:       Referral Information:   # Visits:  1 Referral Type: Home Health [42]   Urgency:  Routine Referral Reason: Specialty Services Required   Start Date: 2018 End " Date:  To be determined by Insurer   Diagnosis: Acute on chronic systolic congestive heart failure (I50.23 [ICD-10-CM] 428.23,428.0 [ICD-9-CM])  SOB (shortness of breath) (R06.02 [ICD-10-CM] 786.05 [ICD-9-CM])      Refer to Dept:   Refer to Provider:   Refer to Facility:       Face to Face Visit Date: 1/12/2018  Follow-up Provider for Plan of Care? I will be treating the patient on an ongoing basis.  Please send me the Plan of Care for signature.  Follow-up Provider: TIN MCGOWAN [[id number]]  Reason/Clinical Findings: weakness  Describe mobility limitations that make leaving home difficult: weakness  Nursing/Therapeutic Services Requested: Skilled Nursing  Nursing/Therapeutic Services Requested: Physical Therapy  Nursing/Therapeutic Services Requested: Occupational Therapy  Skilled nursing orders: Other  Frequency: 1 Week 1     This document serves as a request of services and does not constitute Insurance authorization or approval of services.  To determine eligibility, please contact the members Insurance carrier to verify and review coverage.     If you have medical questions regarding this request for services. Please contact [address] at [phone] between the hours of 8:00am - 5:00pm (Mon-Fri).             Verbal Order Mode: Telephone with readback   Authorizing Provider: Tin Mcgowan MD  Authorizing Provider's NPI: [id number]     Order Entered By: Ksenia Wheeler 1/12/2018 12:08 PM     Electronically signed by:               History & Physical     No notes of this type exist for this encounter.        Operative/Procedure Notes (most recent note)     No notes of this type exist for this encounter.           Physician Progress Notes (most recent note)      Tin Mcgowan MD at 1/9/2018  6:31 AM  Version 1 of 1         Tre Gregoria 85 y.o.   01/09/18    Subjective: Patient continues to improve but still weak and requires assistance with phototherapy.  Objective: Signs  stable no change physical exam patient alert and oriented  Vital Signs (last 72 hrs)       01/05 0700  -  01/06 0659 01/06 0700  -  01/07 0659 01/07 0700  -  01/08 0659 01/08 0700  -  01/09 0631   Most Recent    Temp (°F) 98.3 -  98.8    97.8 -  97.9    97.7 -  98.4    98.1 -  98.2     98.1 (36.7)    Heart Rate 76 -  84    72 -  84    78 -  84    76 -  81     79    Resp 18 -  24    20 -  22    14 -  20      18     18    /82 -  143/70    127/63 -  148/64    121/70 -  158/79    152/82 -  174/84     174/84    SpO2 (%) 96 -  97    95 -  98    97 -  99    97 -  98     98        Lab Results (last 72 hours)     Procedure Component Value Units Date/Time    CBC Auto Differential [[id number]]  (Abnormal) Collected:  01/07/18 0508    Specimen:  Blood Updated:  01/07/18 0539     WBC 4.33 (L) 10*3/mm3      RBC 3.49 (L) 10*6/mm3      Hemoglobin 8.9 (L) g/dL      Hematocrit 28.6 (L) %      MCV 81.9 fL      MCH 25.5 (L) pg      MCHC 31.1 (L) g/dL      RDW 16.6 (H) %      RDW-SD 50.0 fl      MPV 10.2 (H) fL      Platelets 134 10*3/mm3      Neutrophil % 70.2 %      Lymphocyte % 17.3 %      Monocyte % 10.2 %      Eosinophil % 1.4 %      Basophil % 0.7 %      Immature Grans % 0.2 %      Neutrophils, Absolute 3.04 10*3/mm3      Lymphocytes, Absolute 0.75 (L) 10*3/mm3      Monocytes, Absolute 0.44 10*3/mm3      Eosinophils, Absolute 0.06 10*3/mm3      Basophils, Absolute 0.03 10*3/mm3      Immature Grans, Absolute 0.01 10*3/mm3      nRBC 0.0 /100 WBC     CBC & Differential [[id number]] Collected:  01/07/18 0508    Specimen:  Blood Updated:  01/07/18 0539    Narrative:       The following orders were created for panel order CBC & Differential.  Procedure                               Abnormality         Status                     ---------                               -----------         ------                     Scan Slide[[id number]]                                                                  CBC Auto Differential[[id number]]         Abnormal            Final result                 Please view results for these tests on the individual orders.    Protime-INR [[id number]]  (Abnormal) Collected:  01/07/18 0508    Specimen:  Blood Updated:  01/07/18 0551     Protime 48.2 (C) Seconds      INR 5.14 (C)    Narrative:       Suggested INR therapeutic range for stable oral anticoagulant therapy:    Low Intensity therapy:   1.5-2.0  Moderate Intensity therapy:   2.0-3.0  High Intensity therapy:   2.5-4.0    Basic Metabolic Panel [[id number]]  (Abnormal) Collected:  01/07/18 0508    Specimen:  Blood Updated:  01/07/18 0559     Glucose 84 mg/dL      BUN 39 (H) mg/dL      Creatinine 2.04 (H) mg/dL      Sodium 140 mmol/L      Potassium 3.3 (L) mmol/L      Chloride 100 mmol/L      CO2 30.1 mmol/L      Calcium 8.4 mg/dL      eGFR Non African Amer 23 (L) mL/min/1.73      BUN/Creatinine Ratio 19.1     Anion Gap 9.9 mmol/L     Narrative:       The MDRD GFR formula is only valid for adults with stable renal function between ages 18 and 70.    Osmolality, Calculated [[id number]]  (Normal) Collected:  01/07/18 0508    Specimen:  Blood Updated:  01/07/18 0559     Osmolality Calc 288.0 mOsm/kg     C-reactive Protein [[id number]]  (Abnormal) Collected:  01/07/18 0508    Specimen:  Blood Updated:  01/07/18 0559     C-Reactive Protein 7.27 (H) mg/dL     Troponin [[id number]]  (Abnormal) Collected:  01/07/18 0508    Specimen:  Blood Updated:  01/07/18 0612     Troponin I 0.095 (H) ng/mL     Narrative:       Ultra Troponin I Reference Range:         <=0.039 ng/mL: Negative    0.04-0.779 ng/mL: Indeterminate Range. Suspicious of MI.  Clinical correlation required.       >=0.78  ng/mL: Consistent with myocardial injury.  Clinical correlation required.    Protime-INR [[id number]]  (Abnormal) Collected:  01/08/18 0153    Specimen:  Blood Updated:  01/08/18 0222     Protime 37.8 (H) Seconds       Note new Reference Range        INR 3.77 (H)    Narrative:       Suggested INR  therapeutic range for stable oral anticoagulant therapy:    Low Intensity therapy:   1.5-2.0  Moderate Intensity therapy:   2.0-3.0  High Intensity therapy:   2.5-4.0    Basic Metabolic Panel [[id number]]  (Abnormal) Collected:  01/08/18 0153    Specimen:  Blood Updated:  01/08/18 0231     Glucose 88 mg/dL      BUN 35 (H) mg/dL      Creatinine 2.23 (H) mg/dL      Sodium 140 mmol/L      Potassium 3.8 mmol/L      Chloride 102 mmol/L      CO2 27.7 mmol/L      Calcium 8.2 mg/dL      eGFR Non African Amer 21 (L) mL/min/1.73      BUN/Creatinine Ratio 15.7     Anion Gap 10.3 mmol/L     Narrative:       The MDRD GFR formula is only valid for adults with stable renal function between ages 18 and 70.    Osmolality, Calculated [[id number]]  (Normal) Collected:  01/08/18 0153    Specimen:  Blood Updated:  01/08/18 0231     Osmolality Calc 286.8 mOsm/kg     Protime-INR [[id number]]  (Abnormal) Collected:  01/09/18 0024    Specimen:  Blood Updated:  01/09/18 0102     Protime 32.9 (H) Seconds       Note new Reference Range        INR 3.15 (H)    Narrative:       Suggested INR therapeutic range for stable oral anticoagulant therapy:    Low Intensity therapy:   1.5-2.0  Moderate Intensity therapy:   2.0-3.0  High Intensity therapy:   2.5-4.0    Basic Metabolic Panel [[id number]]  (Abnormal) Collected:  01/09/18 0024    Specimen:  Blood Updated:  01/09/18 0117     Glucose 82 mg/dL      BUN 31 (H) mg/dL      Creatinine 1.97 (H) mg/dL      Sodium 136 mmol/L      Potassium 3.8 mmol/L      Chloride 101 mmol/L      CO2 29.9 mmol/L      Calcium 8.4 mg/dL      eGFR Non African Amer 24 (L) mL/min/1.73      BUN/Creatinine Ratio 15.7     Anion Gap 5.1 mmol/L     Narrative:       The MDRD GFR formula is only valid for adults with stable renal function between ages 18 and 70.    Osmolality, Calculated [[id number]]  (Normal) Collected:  01/09/18 0024    Specimen:  Blood Updated:  01/09/18 0117     Osmolality Calc 277.6 mOsm/kg         Imaging Results  (last 24 hours)     Procedure Component Value Units Date/Time    XR Chest 1 View [[id number]] Collected:  18 075     Updated:  18 0759    Narrative:       EXAMINATION: XR CHEST 1 VW-      CLINICAL INDICATION:     pneumonia     TECHNIQUE:  XR CHEST 1 VW-      COMPARISON: 2018      FINDINGS:   Developing opacities right lower lung. Stable opacities left midlung.   Cardiomegaly and left basilar atelectasis stable.   Left cardiac pacer.   Vascular congestion noted.   No effusion or pneumothorax.            Impression:       Increasing airspace disease right lung with no significant  change in left lung airspace disease. Underlying changes of CHF noted.     This report was finalized on 2018 7:57 AM by Dr. Andres Guerra MD.           Assessment:Active Problems:    CHF (congestive heart failure)   Kidney disease  Plan: With physical therapy and gait strength training     Electronically signed by Myles Barone MD at 2018  6:32 AM        Consult Notes (most recent note)     No notes of this type exist for this encounter.        Nutrition Notes (most recent note)     No notes of this type exist for this encounter.           Physical Therapy Notes (most recent note)      Vandana Pike, PT at 2018  3:17 PM  Version 1 of 1         Acute Care - Physical Therapy Treatment Note  [facility]     Patient Name: Tre Kinney  : 1932  MRN: [id number]  Today's Date: 2018  Onset of Illness/Injury or Date of Surgery Date: 18 (swing bed admit date)  Date of Referral to PT: 18       Admit Date: 2018    Visit Dx:  No diagnosis found.  Patient Active Problem List   Diagnosis   • Acid reflux   • Ankle arthralgia   • Cardiac conduction disorder   • Closed fracture of distal fibula   • Arteriosclerosis of coronary artery   • Fracture of distal end of tibia   • Elevated cholesterol   • BP (high blood pressure)   • Hypertrophic polyarthritis   • OP (osteoporosis)   • Right heart  failure   • Heart failure, chronic, right-sided   • SOB (shortness of breath)   • Palpitations   • Acute CHF   • CHF (congestive heart failure)   • Congestive heart failure   • Precordial pain               Adult Rehabilitation Note       01/11/18 1514 01/11/18 1307 01/10/18 1600    Rehab Assessment/Intervention    Discipline physical therapist  -BC occupational therapist  -KR physical therapist  -BC    Document Type therapy note (daily note)  -BC therapy note (daily note)  -KR therapy note (daily note)  -BC    Subjective Information agree to therapy  -BC agree to therapy  -KR no complaints;agree to therapy  -BC    Patient Effort, Rehab Treatment good  -BC good  -KR good  -BC    Recorded by [BC] Vandana Pike, PT [KR] Andres Uriarte OT [BC] Vandana Pike, PT    Pain Assessment    Pain Assessment No/denies pain  -BC  No/denies pain  -BC    Recorded by [BC] Vandana Pike, PT  [BC] Vandana Pike PT    Vision Assessment/Intervention    Visual Impairment WFL  -BC  WFL  -BC    Recorded by [BC] Vandana Pike, PT  [BC] Vandana Pike, PT    Cognitive Assessment/Intervention    Current Cognitive/Communication Assessment functional  -BC  functional  -BC    Orientation Status oriented x 4  -BC  oriented x 4  -BC    Follows Commands/Answers Questions 100% of the time;able to follow single-step instructions  -BC      Personal Safety decreased awareness, need for assist;decreased awareness, need for safety;decreased insight to deficits  -BC      Personal Safety Interventions fall prevention program maintained;gait belt;muscle strengthening facilitated;nonskid shoes/slippers when out of bed  -BC      Recorded by [BC] Vandana Pike, PT  [BC] Vandana Pike, PT    Bed Mobility, Assessment/Treatment    Bed Mobility, Assistive Device bed rails;draw sheet  -BC  bed rails;draw sheet  -BC    Bed Mobility, Scoot/Bridge, [place] verbal cues required;nonverbal cues required (demo/gesture);moderate assist (50%  patient effort);maximum assist (25% patient effort);2 person assist required  -BC  verbal cues required;nonverbal cues required (demo/gesture);moderate assist (50% patient effort);maximum assist (25% patient effort);2 person assist required  -BC    Bed Mob, Supine to Sit, [place] verbal cues required;nonverbal cues required (demo/gesture);moderate assist (50% patient effort);maximum assist (25% patient effort);2 person assist required  -BC  verbal cues required;nonverbal cues required (demo/gesture);moderate assist (50% patient effort);maximum assist (25% patient effort);2 person assist required  -BC    Bed Mobility, Safety Issues decreased use of arms for pushing/pulling;decreased use of legs for bridging/pushing  -BC  decreased use of arms for pushing/pulling;decreased use of legs for bridging/pushing  -BC    Bed Mobility, Impairments strength decreased;impaired balance;coordination impaired  -BC  strength decreased;impaired balance;coordination impaired  -BC    Recorded by [BC] Vandana Pike PT  [BC] Vandana Pike PT    Transfer Assessment/Treatment    Transfers, Bed-Chair [place] verbal cues required;nonverbal cues required (demo/gesture);minimum assist (75% patient effort);moderate assist (50% patient effort);2 person assist required  -BC  verbal cues required;nonverbal cues required (demo/gesture);minimum assist (75% patient effort);moderate assist (50% patient effort);2 person assist required  -BC    Transfers, Chair-Bed [place] verbal cues required;nonverbal cues required (demo/gesture);minimum assist (75% patient effort);moderate assist (50% patient effort);2 person assist required  -BC  verbal cues required;nonverbal cues required (demo/gesture);minimum assist (75% patient effort);moderate assist (50% patient effort);2 person assist required  -BC    Transfers, Bed-Chair-Bed, Assist Device rolling walker  -BC  rolling walker  -BC    Transfers, Sit-Stand [place] verbal cues  required;nonverbal cues required (demo/gesture);minimum assist (75% patient effort);moderate assist (50% patient effort);2 person assist required  -BC  verbal cues required;nonverbal cues required (demo/gesture);minimum assist (75% patient effort);moderate assist (50% patient effort);2 person assist required  -BC    Transfers, Stand-Sit [place] verbal cues required;nonverbal cues required (demo/gesture);minimum assist (75% patient effort);moderate assist (50% patient effort);2 person assist required  -BC  verbal cues required;nonverbal cues required (demo/gesture);minimum assist (75% patient effort);moderate assist (50% patient effort);2 person assist required  -BC    Transfers, Sit-Stand-Sit, Assist Device rolling walker  -BC  rolling walker  -BC    Transfer, Safety Issues balance decreased during turns;sequencing ability decreased;step length decreased;weight-shifting ability decreased  -BC  balance decreased during turns;sequencing ability decreased;step length decreased;weight-shifting ability decreased  -BC    Transfer, Impairments strength decreased;impaired balance;coordination impaired  -BC  strength decreased;impaired balance;coordination impaired  -BC    Recorded by [BC] Vandana Pike, PT  [BC] Vandana Pike PT    Gait Assessment/Treatment    Gait, [place] Level verbal cues required;nonverbal cues required (demo/gesture);moderate assist (50% patient effort);2 person assist required  -BC  verbal cues required;nonverbal cues required (demo/gesture);moderate assist (50% patient effort);2 person assist required  -BC    Gait, Assistive Device rolling walker  -BC  rolling walker  -BC    Gait, Distance (Feet) 45  -BC  30  -BC    Gait, Gait Pattern Analysis swing-to gait  -BC  swing-to gait  -BC    Gait, Impairments strength decreased;impaired balance;coordination impaired  -BC  strength decreased;impaired balance;coordination impaired  -BC    Recorded by [BC] Vandana Pike PT  [BC] Vandana KWAN  EDEL Pike    Balance Skills Training    Sitting-Level of Assistance Contact guard  -BC  Contact guard  -BC    Sitting-Balance Support Feet supported  -BC  Feet supported  -BC    Standing-Level of Assistance Minimum assistance;Moderate assistance;x2  -BC  Minimum assistance;Moderate assistance;x2  -BC    Static Standing Balance Support assistive device  -BC  assistive device  -BC    Gait Balance-Level of Assistance Moderate assistance;x2  -BC  Moderate assistance;x2  -BC    Gait Balance Support assistive device  -BC  assistive device  -BC    Recorded by [BC] Vandana Pike PT  [BC] Vandana Pike PT    Therapy Exercises    Bilateral Lower Extremities AROM:;15 reps;sitting;ankle pumps/circles;hip flexion;LAQ  -BC  AROM:;15 reps;sitting;ankle pumps/circles;hip flexion;LAQ  -BC    Bilateral Upper Extremity  AROM:;supine;elbow flexion/extension;hand pumps;shoulder extension/flexion  -KR     Recorded by [BC] Vandana Pike PT [KR] Andres Uriarte OT [BC] Vandana Pike PT    Positioning and Restraints    Pre-Treatment Position in bed  -BC  in bed  -BC    Post Treatment Position bed  -BC  chair  -BC    In Bed notified nsg;supine;call light within reach;encouraged to call for assist;side rails up x3  -BC      In Chair   notified nsg;sitting;call light within reach;encouraged to call for assist  -BC    Recorded by [BC] Vandana Pike PT  [BC] Vandana Pike PT      01/10/18 1457 01/09/18 1655 01/08/18 1750    Rehab Assessment/Intervention    Discipline occupational therapist  -KR physical therapist  -BC physical therapist  -AG    Document Type therapy note (daily note)  -KR therapy note (daily note)  -BC therapy note (daily note)  -AG    Subjective Information  no complaints;agree to therapy  -BC agree to therapy   pt. is anxious  -AG    Patient Effort, Rehab Treatment adequate  -KR adequate  -BC adequate  -AG    Symptoms Noted During/After Treatment   fatigue   anxiety  -AG    Precautions/Limitations   fall  precautions;oxygen therapy device and L/min  -AG    Patient Response to Treatment   pt. responded favorably to treatment; however, continues to demonstrate some anxiety and SOB with short distance ambulation. More independent sit<>stand with RW noted today.  -AG    Recorded by [KR] Andres Uriarte, OT [BC] Vandana Pike, PT [AG] Priscilla Cruz, PT    Pain Assessment    Pain Assessment   No/denies pain  -AG    Recorded by   [AG] Priscilla Cruz PT    Vision Assessment/Intervention    Visual Impairment  WFL  -BC WFL  -AG    Recorded by  [BC] Vandana Pike, PT [AG] Priscilla Cruz, PT    Cognitive Assessment/Intervention    Current Cognitive/Communication Assessment  functional  -BC functional  -AG    Orientation Status  oriented x 4  -BC oriented x 4  -AG    Follows Commands/Answers Questions   100% of the time;able to follow single-step instructions;needs cueing;needs repetition  -AG    Personal Safety   decreased awareness, need for assist;decreased awareness, need for safety  -AG    Personal Safety Interventions   fall prevention program maintained;gait belt;nonskid shoes/slippers when out of bed;supervised activity  -AG    Recorded by  [BC] Vandana Pike, PT [AG] Priscilla Cruz, PT    Mobility Assessment/Training    Extremity Weight-Bearing Status   --   no weightbearing restrictions  -AG    Recorded by   [AG] Priscilla Cruz PT    Bed Mobility, Assessment/Treatment    Bed Mobility, Assistive Device  bed rails;draw sheet  -BC bed rails;draw sheet  -AG    Bed Mobility, Scoot/Bridge, [place]  verbal cues required;nonverbal cues required (demo/gesture);moderate assist (50% patient effort);maximum assist (25% patient effort);2 person assist required  -BC verbal cues required;nonverbal cues required (demo/gesture);moderate assist (50% patient effort);maximum assist (25% patient effort);2 person assist required  -AG    Bed Mob, Supine to Sit, [place]  verbal cues required;nonverbal cues required  (demo/gesture);moderate assist (50% patient effort);maximum assist (25% patient effort);2 person assist required  -BC verbal cues required;nonverbal cues required (demo/gesture);moderate assist (50% patient effort);maximum assist (25% patient effort);2 person assist required  -AG    Bed Mobility, Safety Issues  decreased use of arms for pushing/pulling;decreased use of legs for bridging/pushing  -BC decreased use of arms for pushing/pulling;decreased use of legs for bridging/pushing  -AG    Bed Mobility, Impairments  strength decreased;impaired balance;coordination impaired  -BC strength decreased;impaired balance;coordination impaired  -AG    Recorded by  [BC] Vandana Pike, PT [AG] Priscilla Cruz, PT    Transfer Assessment/Treatment    Transfers, Bed-Chair [place]  verbal cues required;nonverbal cues required (demo/gesture);minimum assist (75% patient effort);moderate assist (50% patient effort);2 person assist required  -BC verbal cues required;nonverbal cues required (demo/gesture);minimum assist (75% patient effort);moderate assist (50% patient effort);2 person assist required  -AG    Transfers, Chair-Bed [place]  verbal cues required;nonverbal cues required (demo/gesture);minimum assist (75% patient effort);moderate assist (50% patient effort);2 person assist required  -BC verbal cues required;nonverbal cues required (demo/gesture);minimum assist (75% patient effort);moderate assist (50% patient effort);2 person assist required  -AG    Transfers, Bed-Chair-Bed, Assist Device  rolling walker  -BC rolling walker  -AG    Transfers, Sit-Stand [place]  verbal cues required;nonverbal cues required (demo/gesture);minimum assist (75% patient effort);moderate assist (50% patient effort);2 person assist required  -BC verbal cues required;nonverbal cues required (demo/gesture);minimum assist (75% patient effort);moderate assist (50% patient effort);2 person assist required  -AG    Transfers, Stand-Sit  [place]  verbal cues required;nonverbal cues required (demo/gesture);minimum assist (75% patient effort);moderate assist (50% patient effort);2 person assist required  -BC verbal cues required;nonverbal cues required (demo/gesture);minimum assist (75% patient effort);moderate assist (50% patient effort);2 person assist required  -AG    Transfers, Sit-Stand-Sit, Assist Device  rolling walker  -BC rolling walker  -AG    Transfer, Safety Issues  balance decreased during turns;sequencing ability decreased;step length decreased;weight-shifting ability decreased  -BC balance decreased during turns;sequencing ability decreased;step length decreased;weight-shifting ability decreased  -AG    Transfer, Impairments  strength decreased;impaired balance;coordination impaired  -BC strength decreased;impaired balance;coordination impaired  -AG    Recorded by  [BC] Vandana Pike, PT [AG] Priscilla Cruz, PT    Gait Assessment/Treatment    Gait, [place] Level  verbal cues required;nonverbal cues required (demo/gesture);moderate assist (50% patient effort);2 person assist required  -BC verbal cues required;nonverbal cues required (demo/gesture);moderate assist (50% patient effort);2 person assist required  -AG    Gait, Assistive Device  rolling walker  -BC rolling walker  -AG    Gait, Distance (Feet)  25  -BC 12  -AG    Gait, Gait Pattern Analysis  swing-to gait  -BC swing-to gait  -AG    Gait, Gait Deviations   calvin decreased;decreased heel strike;forward flexed posture;step length decreased;weight-shifting ability decreased  -AG    Gait, Safety Issues   balance decreased during turns;sequencing ability decreased;step length decreased;weight-shifting ability decreased;supplemental O2  -AG    Gait, Impairments  strength decreased;impaired balance;coordination impaired  -BC strength decreased;impaired balance;coordination impaired  -AG    Recorded by  [BC] Vandana Pike, PT [AG] Priscilla Cruz, PT    Motor  Skills/Interventions    Additional Documentation   Balance Skills Training (Group)  -AG    Recorded by   [AG] Priscilla Cruz PT    Balance Skills Training    Sitting-Level of Assistance  Contact guard  -BC Contact guard  -AG    Sitting-Balance Support  Feet supported  -BC Feet supported  -AG    Standing-Level of Assistance  Minimum assistance;Moderate assistance;x2  -BC Minimum assistance;Moderate assistance;x2  -AG    Static Standing Balance Support  assistive device  -BC assistive device  -AG    Gait Balance-Level of Assistance  Moderate assistance;x2  -BC Moderate assistance;x2  -AG    Gait Balance Support  assistive device  -BC assistive device  -AG    Recorded by  [BC] Vandana Pike, PT [AG] Priscilla Cruz PT    Therapy Exercises    Bilateral Lower Extremities  AROM:;15 reps;sitting;ankle pumps/circles;hip flexion;LAQ  -BC AROM:;15 reps;sitting;ankle pumps/circles;hip flexion;LAQ  -AG    Bilateral Upper Extremity AROM:;supine;elbow flexion/extension;hand pumps;shoulder extension/flexion  -KR      Recorded by [KR] Andres Uriarte, OT [BC] Vandana Pike, PT [AG] Priscilla Cruz PT    Positioning and Restraints    Pre-Treatment Position  in bed  -BC in bed  -AG    Post Treatment Position  bed  -BC chair  -AG    In Bed  notified nsg;supine;call light within reach;encouraged to call for assist;side rails up x3  -BC     In Chair   sitting;call light within reach;encouraged to call for assist;with family/caregiver  -AG    Recorded by  [BC] Vandana Pike, PT [AG] Priscilla Cruz PT      User Key  (r) = Recorded By, (t) = Taken By, (c) = Cosigned By    Initials Name Effective Dates    BC Vandana Pike, PT 03/14/16 -     AG Priscilla Cruz, PT 03/14/16 -     KR Andres Uriarte, OT 03/14/16 -                 IP PT Goals       01/05/18 1712 01/02/18 1727       Bed Mobility PT LTG    Bed Mobility PT LTG, Date Established 01/05/18  -CT 01/02/18  -BC     Bed Mobility PT LTG, Time to Achieve by discharge  -CT by discharge   -BC     Bed Mobility PT LTG, Activity Type all bed mobility  -CT all bed mobility  -BC     Bed Mobility PT LTG, [place] Level minimum assist (75% patient effort);moderate assist (50% patient effort)  -CT minimum assist (75% patient effort)  -BC     Bed Mobility PT Goal  LTG, Assist Device bed rails  -CT bed rails  -BC     Transfer Training PT LTG    Transfer Training PT LTG, Date Established 01/05/18  -CT 01/02/18  -BC     Transfer Training PT LTG, Time to Achieve by discharge  -CT by discharge  -BC     Transfer Training PT LTG, Activity Type bed to chair /chair to bed;sit to stand/stand to sit  -CT all transfers  -BC     Transfer Training PT LTG, [place] Level moderate assist (50% patient effort)  -CT minimum assist (75% patient effort)  -BC     Transfer Training PT LTG, Assist Device other (see comments)   with appropriate AD  -CT walker, rolling  -BC     Gait Training PT LTG    Gait Training Goal PT LTG, Date Established 01/05/18  -CT 01/02/18  -BC     Gait Training Goal PT LTG, Time to Achieve by discharge  -CT by discharge  -BC     Gait Training Goal PT LTG, [place] Level moderate assist (50% patient effort);2 person assist required  -CT minimum assist (75% patient effort);moderate assist (50% patient effort)  -BC     Gait Training Goal PT LTG, Assist Device other (see comments)   with appropriate AD  -CT walker, rolling  -BC     Gait Training Goal PT LTG, Distance to Achieve 25  -CT 20  -BC       User Key  (r) = Recorded By, (t) = Taken By, (c) = Cosigned By    Initials Name Provider Type    BC Vandana Pike, PT Physical Therapist    CT Bhargavi Vásquez, PT Physical Therapist          Physical Therapy Education     Title: PT OT SLP Therapies (Done)     Topic: Physical Therapy (Done)     Point: Mobility training (Done)    Learning Progress Summary    Learner Readiness Method Response Comment Documented by Status   Patient Acceptance E VU  BC 01/10/18 1645 Done    Acceptance E NR  BW 01/10/18  1407 Active    Acceptance E VU  BC 01/09/18 1657 Done    Acceptance E VU  TD 01/09/18 1611 Done    Acceptance E,D NR  AG 01/08/18 1757 Active    Acceptance E VU  DM 01/08/18 1747 Done    Acceptance E VU  DM 01/07/18 1103 Done    Acceptance E VU  BC 01/06/18 1122 Done    Acceptance E VU  CT 01/05/18 1713 Done               Point: Home exercise program (Done)    Learning Progress Summary    Learner Readiness Method Response Comment Documented by Status   Patient Acceptance E VU  BC 01/10/18 1645 Done    Acceptance E NR  BW 01/10/18 1407 Active    Acceptance E VU  BC 01/09/18 1657 Done    Acceptance E VU  TD 01/09/18 1611 Done    Acceptance E,D NR  AG 01/08/18 1757 Active    Acceptance E VU  DM 01/08/18 1747 Done    Acceptance E VU  DM 01/07/18 1103 Done    Acceptance E VU  BC 01/06/18 1122 Done    Acceptance E VU  CT 01/05/18 1713 Done               Point: Body mechanics (Done)    Learning Progress Summary    Learner Readiness Method Response Comment Documented by Status   Patient Acceptance E VU  BC 01/10/18 1645 Done    Acceptance E NR  BW 01/10/18 1407 Active    Acceptance E VU  BC 01/09/18 1657 Done    Acceptance E VU  TD 01/09/18 1611 Done    Acceptance E,D NR  AG 01/08/18 1757 Active    Acceptance E VU  DM 01/08/18 1747 Done    Acceptance E VU  DM 01/07/18 1103 Done    Acceptance E VU  BC 01/06/18 1122 Done    Acceptance E VU  CT 01/05/18 1713 Done               Point: Precautions (Done)    Learning Progress Summary    Learner Readiness Method Response Comment Documented by Status   Patient Acceptance E VU  BC 01/10/18 1645 Done    Acceptance E NR  BW 01/10/18 1407 Active    Acceptance E VU  BC 01/09/18 1657 Done    Acceptance E VU  TD 01/09/18 1611 Done    Acceptance E,D NR  AG 01/08/18 1757 Active    Acceptance E VU  DM 01/08/18 1747 Done    Acceptance E VU  DM 01/07/18 1103 Done    Acceptance E VU  BC 01/06/18 1122 Done    Acceptance E VU  CT 01/05/18 1713 Done                      User Key     Initials  Effective Dates Name Provider Type Discipline    TD 06/16/16 -  Yan Sterling, RN Registered Nurse Nurse    DM 06/16/16 -  Nehal Oneal, RN Registered Nurse Nurse    BW 06/16/16 -  Di Bowen, RN Registered Nurse Nurse    BC 03/14/16 -  Vandana Pike, PT Physical Therapist PT    AG 03/14/16 -  Priscilla Cruz, PT Physical Therapist PT    CT 03/14/16 -  Bhargavi Vásquez, PT Physical Therapist PT                    PT Recommendation and Plan  Anticipated Equipment Needs At Discharge: front wheeled walker, bedside commode  Anticipated Discharge Disposition: home with 24/7 care, skilled nursing facility  Planned Therapy Interventions: balance training, bed mobility training, gait training, home exercise program, manual therapy techniques, neuromuscular re-education, patient/family education, strengthening, transfer training  PT Frequency: 5 times/wk, per priority policy             Outcome Measures       01/11/18 1500 01/10/18 1700 01/09/18 1600    How much help from another person do you currently need...    Turning from your back to your side while in flat bed without using bedrails? 3  -BC 3  -BC 2  -BC    Moving from lying on back to sitting on the side of a flat bed without bedrails? 3  -BC 3  -BC 2  -BC    Moving to and from a bed to a chair (including a wheelchair)? 2  -BC 2  -BC 2  -BC    Standing up from a chair using your arms (e.g., wheelchair, bedside chair)? 2  -BC 2  -BC 2  -BC    Climbing 3-5 steps with a railing? 2  -BC 2  -BC 1  -BC    To walk in hospital room? 3  -BC 3  -BC 2  -BC    AM-PAC 6 Clicks Score 15  -BC 15  -BC 11  -BC    Functional Assessment    Outcome Measure Options AM-PAC 6 Clicks Basic Mobility (PT)  -BC AM-PAC 6 Clicks Basic Mobility (PT)  -BC AM-PAC 6 Clicks Basic Mobility (PT)  -BC      User Key  (r) = Recorded By, (t) = Taken By, (c) = Cosigned By    Initials Name Provider Type    BC Vandana Pike, PT Physical Therapist           Time Calculation:         PT Charges        18 1517          Time Calculation    Start Time --   30  -BC      PT Received On 18  -BC        User Key  (r) = Recorded By, (t) = Taken By, (c) = Cosigned By    Initials Name Provider Type    WHITNEY Pike PT Physical Therapist          Therapy Charges for Today     Code Description Service Date Service Provider Modifiers Qty    32398116169 HC GAIT TRAINING EA 15 MIN 1/10/2018 Vandana Pike, PT GP 1    91301088461 HC PT THERAPEUTIC ACT EA 15 MIN 1/10/2018 Vandana Pike, PT GP 1    45830623094 HC PT THER SUPP EA 15 MIN 1/10/2018 Vandana Pike, PT GP 2    42275627401 HC GAIT TRAINING EA 15 MIN 2018 Vandana Pike, PT GP 1    31520356782 HC PT THERAPEUTIC ACT EA 15 MIN 2018 Vandana Pike, PT GP 1    43803363650 HC PT THER SUPP EA 15 MIN 2018 Vandana Pike, PT GP 2          PT G-Codes  Outcome Measure Options: AM-PAC 6 Clicks Basic Mobility (PT)  Score: 10  Functional Limitation: Mobility: Walking and moving around  Mobility: Walking and Moving Around Current Status (): At least 60 percent but less than 80 percent impaired, limited or restricted  Mobility: Walking and Moving Around Goal Status (): At least 40 percent but less than 60 percent impaired, limited or restricted    Vandana Pike PT  2018          Electronically signed by Vandana Pike PT at 2018  3:17 PM           Occupational Therapy Notes (most recent note)      Andres Uriarte, OT at 2018  1:08 PM  Version 1 of 1         Acute Care - Occupational Therapy Treatment Note   [place]     Patient Name: Tre Kinney  : 1932  MRN: [id number]  Today's Date: 2018  Onset of Illness/Injury or Date of Surgery Date: 18 (swing bed admit date)            Admit Date: 2018    Visit Dx:   No diagnosis found.  Patient Active Problem List   Diagnosis   • Acid reflux   • Ankle arthralgia   • Cardiac conduction disorder   • Closed fracture of distal fibula   •  Arteriosclerosis of coronary artery   • Fracture of distal end of tibia   • Elevated cholesterol   • BP (high blood pressure)   • Hypertrophic polyarthritis   • OP (osteoporosis)   • Right heart failure   • Heart failure, chronic, right-sided   • SOB (shortness of breath)   • Palpitations   • Acute CHF   • CHF (congestive heart failure)   • Congestive heart failure   • Precordial pain             Adult Rehabilitation Note       01/11/18 1307 01/10/18 1600 01/10/18 1457    Rehab Assessment/Intervention    Discipline occupational therapist  -KR physical therapist  -BC occupational therapist  -KR    Document Type therapy note (daily note)  -KR therapy note (daily note)  -BC therapy note (daily note)  -KR    Subjective Information agree to therapy  -KR no complaints;agree to therapy  -BC     Patient Effort, Rehab Treatment good  -KR good  -BC adequate  -KR    Recorded by [KR] Andres Uriarte, OT [BC] Vandana Pike, PT [KR] Andres Uriarte, OT    Pain Assessment    Pain Assessment  No/denies pain  -BC     Recorded by  [BC] Vandana Pike, PT     Vision Assessment/Intervention    Visual Impairment  WFL  -BC     Recorded by  [BC] Vandana Pike, PT     Cognitive Assessment/Intervention    Current Cognitive/Communication Assessment  functional  -BC     Orientation Status  oriented x 4  -BC     Recorded by  [BC] Vandana Pike, PT     Bed Mobility, Assessment/Treatment    Bed Mobility, Assistive Device  bed rails;draw sheet  -BC     Bed Mobility, Scoot/Bridge, [place]  verbal cues required;nonverbal cues required (demo/gesture);moderate assist (50% patient effort);maximum assist (25% patient effort);2 person assist required  -BC     Bed Mob, Supine to Sit, [place]  verbal cues required;nonverbal cues required (demo/gesture);moderate assist (50% patient effort);maximum assist (25% patient effort);2 person assist required  -BC     Bed Mobility, Safety Issues  decreased use of arms for pushing/pulling;decreased use  of legs for bridging/pushing  -BC     Bed Mobility, Impairments  strength decreased;impaired balance;coordination impaired  -BC     Recorded by  [BC] Vandana Pike PT     Transfer Assessment/Treatment    Transfers, Bed-Chair [place]  verbal cues required;nonverbal cues required (demo/gesture);minimum assist (75% patient effort);moderate assist (50% patient effort);2 person assist required  -BC     Transfers, Chair-Bed [place]  verbal cues required;nonverbal cues required (demo/gesture);minimum assist (75% patient effort);moderate assist (50% patient effort);2 person assist required  -BC     Transfers, Bed-Chair-Bed, Assist Device  rolling walker  -BC     Transfers, Sit-Stand [place]  verbal cues required;nonverbal cues required (demo/gesture);minimum assist (75% patient effort);moderate assist (50% patient effort);2 person assist required  -BC     Transfers, Stand-Sit [place]  verbal cues required;nonverbal cues required (demo/gesture);minimum assist (75% patient effort);moderate assist (50% patient effort);2 person assist required  -BC     Transfers, Sit-Stand-Sit, Assist Device  rolling walker  -BC     Transfer, Safety Issues  balance decreased during turns;sequencing ability decreased;step length decreased;weight-shifting ability decreased  -BC     Transfer, Impairments  strength decreased;impaired balance;coordination impaired  -BC     Recorded by  [BC] Vandana Pike PT     Gait Assessment/Treatment    Gait, [place] Level  verbal cues required;nonverbal cues required (demo/gesture);moderate assist (50% patient effort);2 person assist required  -BC     Gait, Assistive Device  rolling walker  -BC     Gait, Distance (Feet)  30  -BC     Gait, Gait Pattern Analysis  swing-to gait  -BC     Gait, Impairments  strength decreased;impaired balance;coordination impaired  -BC     Recorded by  [BC] Vandana Pike PT     Balance Skills Training    Sitting-Level of Assistance  Contact guard   -BC     Sitting-Balance Support  Feet supported  -BC     Standing-Level of Assistance  Minimum assistance;Moderate assistance;x2  -BC     Static Standing Balance Support  assistive device  -BC     Gait Balance-Level of Assistance  Moderate assistance;x2  -BC     Gait Balance Support  assistive device  -BC     Recorded by  [BC] Vandana Pike, PT     Therapy Exercises    Bilateral Lower Extremities  AROM:;15 reps;sitting;ankle pumps/circles;hip flexion;LAQ  -BC     Bilateral Upper Extremity AROM:;supine;elbow flexion/extension;hand pumps;shoulder extension/flexion  -KR  AROM:;supine;elbow flexion/extension;hand pumps;shoulder extension/flexion  -KR    Recorded by [KR] Andres Uriarte, OT [BC] Vandana Pike, PT [KR] Andres rUiarte, OT    Positioning and Restraints    Pre-Treatment Position  in bed  -BC     Post Treatment Position  chair  -BC     In Chair  notified nsg;sitting;call light within reach;encouraged to call for assist  -BC     Recorded by  [BC] Vandana Pike PT       01/09/18 1655 01/08/18 1750 01/08/18 1451    Rehab Assessment/Intervention    Discipline physical therapist  -BC physical therapist  -AG occupational therapist  -KR    Document Type therapy note (daily note)  -BC therapy note (daily note)  -AG therapy note (daily note)  -KR    Subjective Information no complaints;agree to therapy  -BC agree to therapy   pt. is anxious  -AG agree to therapy  -KR    Patient Effort, Rehab Treatment adequate  -BC adequate  -AG     Symptoms Noted During/After Treatment  fatigue   anxiety  -AG     Precautions/Limitations  fall precautions;oxygen therapy device and L/min  -AG     Patient Response to Treatment  pt. responded favorably to treatment; however, continues to demonstrate some anxiety and SOB with short distance ambulation. More independent sit<>stand with RW noted today.  -AG     Recorded by [BC] Vandana Pike, PT [AG] Priscilla Cruz, PT [KR] Andres Uriarte, OT    Pain Assessment    Pain Assessment   No/denies pain  -AG     Recorded by  [AG] Priscilla Cruz, PT     Vision Assessment/Intervention    Visual Impairment WFL  -BC WFL  -AG     Recorded by [BC] Vandana Pike PT [AG] Priscilla Cruz PT     Cognitive Assessment/Intervention    Current Cognitive/Communication Assessment functional  -BC functional  -AG     Orientation Status oriented x 4  -BC oriented x 4  -AG     Follows Commands/Answers Questions  100% of the time;able to follow single-step instructions;needs cueing;needs repetition  -AG     Personal Safety  decreased awareness, need for assist;decreased awareness, need for safety  -AG     Personal Safety Interventions  fall prevention program maintained;gait belt;nonskid shoes/slippers when out of bed;supervised activity  -AG     Recorded by [BC] Vandana Pike, PT [AG] Priscilla Cruz PT     Mobility Assessment/Training    Extremity Weight-Bearing Status  --   no weightbearing restrictions  -AG     Recorded by  [AG] Priscilla Cruz PT     Bed Mobility, Assessment/Treatment    Bed Mobility, Assistive Device bed rails;draw sheet  -BC bed rails;draw sheet  -AG     Bed Mobility, Scoot/Bridge, [place] verbal cues required;nonverbal cues required (demo/gesture);moderate assist (50% patient effort);maximum assist (25% patient effort);2 person assist required  -BC verbal cues required;nonverbal cues required (demo/gesture);moderate assist (50% patient effort);maximum assist (25% patient effort);2 person assist required  -AG     Bed Mob, Supine to Sit, [place] verbal cues required;nonverbal cues required (demo/gesture);moderate assist (50% patient effort);maximum assist (25% patient effort);2 person assist required  -BC verbal cues required;nonverbal cues required (demo/gesture);moderate assist (50% patient effort);maximum assist (25% patient effort);2 person assist required  -AG     Bed Mobility, Safety Issues decreased use of arms for pushing/pulling;decreased use of legs for bridging/pushing  -BC  decreased use of arms for pushing/pulling;decreased use of legs for bridging/pushing  -AG     Bed Mobility, Impairments strength decreased;impaired balance;coordination impaired  -BC strength decreased;impaired balance;coordination impaired  -AG     Recorded by [BC] Vandana Pike PT [AG] Priscilla Cruz, PT     Transfer Assessment/Treatment    Transfers, Bed-Chair [place] verbal cues required;nonverbal cues required (demo/gesture);minimum assist (75% patient effort);moderate assist (50% patient effort);2 person assist required  -BC verbal cues required;nonverbal cues required (demo/gesture);minimum assist (75% patient effort);moderate assist (50% patient effort);2 person assist required  -AG     Transfers, Chair-Bed [place] verbal cues required;nonverbal cues required (demo/gesture);minimum assist (75% patient effort);moderate assist (50% patient effort);2 person assist required  -BC verbal cues required;nonverbal cues required (demo/gesture);minimum assist (75% patient effort);moderate assist (50% patient effort);2 person assist required  -AG     Transfers, Bed-Chair-Bed, Assist Device rolling walker  -BC rolling walker  -AG     Transfers, Sit-Stand [place] verbal cues required;nonverbal cues required (demo/gesture);minimum assist (75% patient effort);moderate assist (50% patient effort);2 person assist required  -BC verbal cues required;nonverbal cues required (demo/gesture);minimum assist (75% patient effort);moderate assist (50% patient effort);2 person assist required  -AG     Transfers, Stand-Sit [place] verbal cues required;nonverbal cues required (demo/gesture);minimum assist (75% patient effort);moderate assist (50% patient effort);2 person assist required  -BC verbal cues required;nonverbal cues required (demo/gesture);minimum assist (75% patient effort);moderate assist (50% patient effort);2 person assist required  -AG     Transfers, Sit-Stand-Sit, Assist Device rolling walker  -BC  rolling walker  -AG     Transfer, Safety Issues balance decreased during turns;sequencing ability decreased;step length decreased;weight-shifting ability decreased  -BC balance decreased during turns;sequencing ability decreased;step length decreased;weight-shifting ability decreased  -AG     Transfer, Impairments strength decreased;impaired balance;coordination impaired  -BC strength decreased;impaired balance;coordination impaired  -AG     Recorded by [BC] Vandana Pike, PT [AG] Priscilla Cruz, PT     Gait Assessment/Treatment    Gait, [place] Level verbal cues required;nonverbal cues required (demo/gesture);moderate assist (50% patient effort);2 person assist required  -BC verbal cues required;nonverbal cues required (demo/gesture);moderate assist (50% patient effort);2 person assist required  -AG     Gait, Assistive Device rolling walker  -BC rolling walker  -AG     Gait, Distance (Feet) 25  -BC 12  -AG     Gait, Gait Pattern Analysis swing-to gait  -BC swing-to gait  -AG     Gait, Gait Deviations  calvin decreased;decreased heel strike;forward flexed posture;step length decreased;weight-shifting ability decreased  -AG     Gait, Safety Issues  balance decreased during turns;sequencing ability decreased;step length decreased;weight-shifting ability decreased;supplemental O2  -AG     Gait, Impairments strength decreased;impaired balance;coordination impaired  -BC strength decreased;impaired balance;coordination impaired  -AG     Recorded by [BC] Vandana Pike, PT [AG] Priscilla Cruz, PT     Motor Skills/Interventions    Additional Documentation  Balance Skills Training (Group)  -AG     Recorded by  [AG] Priscilla Cruz, PT     Balance Skills Training    Sitting-Level of Assistance Contact guard  -BC Contact guard  -AG     Sitting-Balance Support Feet supported  -BC Feet supported  -AG     Standing-Level of Assistance Minimum assistance;Moderate assistance;x2  -BC Minimum assistance;Moderate assistance;x2   -AG     Static Standing Balance Support assistive device  -BC assistive device  -AG     Gait Balance-Level of Assistance Moderate assistance;x2  -BC Moderate assistance;x2  -AG     Gait Balance Support assistive device  -BC assistive device  -AG     Recorded by [BC] Vandana Pike, PT [AG] Priscilla Cruz PT     Therapy Exercises    Bilateral Lower Extremities AROM:;15 reps;sitting;ankle pumps/circles;hip flexion;LAQ  -BC AROM:;15 reps;sitting;ankle pumps/circles;hip flexion;LAQ  -AG     Bilateral Upper Extremity   AAROM:;sitting;hand pumps;shoulder extension/flexion;10 reps  -KR    Recorded by [BC] Vandana Pike, PT [AG] Priscilla Cruz, PT [KR] Andres Uriarte OT    Positioning and Restraints    Pre-Treatment Position in bed  -BC in bed  -AG     Post Treatment Position bed  -BC chair  -AG     In Bed notified nsg;supine;call light within reach;encouraged to call for assist;side rails up x3  -BC      In Chair  sitting;call light within reach;encouraged to call for assist;with family/caregiver  -AG     Recorded by [BC] Vandana Pike, PT [AG] Priscilla Cruz, PT       User Key  (r) = Recorded By, (t) = Taken By, (c) = Cosigned By    Initials Name Effective Dates    BC Vandana Pike, PT 03/14/16 -     AG Priscilla Cruz, PT 03/14/16 -     KR Andres Uriarte OT 03/14/16 -                 OT Goals       01/05/18 1509          Strength OT LTG    Strength Goal OT LTG, Date Established 01/05/18  -KR      Strength Goal OT LTG, Time to Achieve by discharge  -KR      Strength Goal OT LTG, Measure to Achieve BUE increase x 1 to enhance self care/mobility  -KR      ADL OT LTG    ADL OT LTG, Date Established 01/05/18  -KR      ADL OT LTG, Time to Achieve by discharge  -KR      ADL OT LTG, Activity Type ADL skills  -KR      ADL OT LTG, [place] Level min assist  -KR        User Key  (r) = Recorded By, (t) = Taken By, (c) = Cosigned By    Initials Name Provider Type    KARLO Uriarte OT Occupational Therapist                 OT Recommendation and Plan  Planned Therapy Interventions: activity intolerance, ADL retraining, strengthening  Therapy Frequency: 3-5 times/wk           Outcome Measures       01/10/18 1700 01/09/18 1600       How much help from another person do you currently need...    Turning from your back to your side while in flat bed without using bedrails? 3  -BC 2  -BC     Moving from lying on back to sitting on the side of a flat bed without bedrails? 3  -BC 2  -BC     Moving to and from a bed to a chair (including a wheelchair)? 2  -BC 2  -BC     Standing up from a chair using your arms (e.g., wheelchair, bedside chair)? 2  -BC 2  -BC     Climbing 3-5 steps with a railing? 2  -BC 1  -BC     To walk in hospital room? 3  -BC 2  -BC     AM-PAC 6 Clicks Score 15  -BC 11  -BC     Functional Assessment    Outcome Measure Options AM-PAC 6 Clicks Basic Mobility (PT)  -BC AM-PAC 6 Clicks Basic Mobility (PT)  -BC       User Key  (r) = Recorded By, (t) = Taken By, (c) = Cosigned By    Initials Name Provider Type    WHITNEY Pike, PT Physical Therapist           Time Calculation:         Time Calculation- OT       01/11/18 1307          Time Calculation- OT    Total Timed Code Minutes- OT 15 minute(s)  -KR        User Key  (r) = Recorded By, (t) = Taken By, (c) = Cosigned By    Initials Name Provider Type    KARLO Uriarte OT Occupational Therapist           Therapy Charges for Today     Code Description Service Date Service Provider Modifiers Qty    18190190975  OT THERAPEUTIC ACT EA 15 MIN 1/10/2018 Andres Uriarte OT GO 1    25263988774 HC OT THERAPEUTIC ACT EA 15 MIN 1/11/2018 Andres Uriarte OT GO 1          OT G-codes  Functional Limitation: Self care  Self Care Current Status (): At least 60 percent but less than 80 percent impaired, limited or restricted  Self Care Goal Status (): At least 20 percent but less than 40 percent impaired, limited or restricted    Andres Uriarte OT  1/11/2018      Electronically signed by Andres Uriarte OT at 1/11/2018  1:08 PM        Speech Language Pathology Notes (most recent note)     No notes of this type exist for this encounter.        Respiratory Therapy Notes (most recent note)     No notes of this type exist for this encounter.          Discharge Summary     No notes of this type exist for this encounter.        Discharge Order     Start     Ordered    01/12/18 0758  Discharge patient  Once     Expected Discharge Date:  01/12/18    Discharge Disposition:  Home or Self Care        01/12/18 0757

## 2018-01-12 NOTE — DISCHARGE INSTR - APPOINTMENTS
You have a scheduled follow-up appointment with Dr. Barone on 1/19/18 at 11:40am. Office Number [phone]

## 2018-01-12 NOTE — DISCHARGE PLACEMENT REQUEST
"Tre Kinney (85 y.o. Female)     Date of Birth Social Security Number Address Home Phone MRN    1932  [address] [place], [place] [address] [phone] [id number]    Taoist Marital Status          None        Admission Date Admission Type Admitting Provider Attending Provider Department, Room/Bed    18 Elective Myles Barone MD  [address], 3344/2S    Discharge Date Discharge Disposition Discharge Destination        2018 Home or Self Care             Attending Provider: (none)    Allergies:  Latex    Isolation:  None   Infection:  None   Code Status:  Prior    Ht:  170.2 cm (67\")   Wt:  81.3 kg (179 lb 3.2 oz)    Admission Cmt:  None   Principal Problem:  None                Active Insurance as of 2018     Primary Coverage     Payor Plan Insurance Group Employer/Plan Group    MEDICARE RAILROAD MEDICARE      Payor Plan Address Payor Plan Phone Number Effective From Effective To    [address] [phone] 1997     [place] [address]       Subscriber Name Subscriber Birth Date Member ID       TRE KINNEY 1932 [id number]                 Emergency Contacts      (Rel.) Home Phone Work Phone Mobile Phone    Valdez Kinney (Son) -- -- [phone]    Phillip Kinney (Son) -- -- [phone]        [address] [address]  Phone:  [phone]  Fax:          Patient:     Tre Kinney MRN:  [id number]   [address] [facility] [address] :  1932  SSN:    Phone: [phone] Sex:  F      INSURANCE PAYOR PLAN GROUP # SUBSCRIBER ID   Primary: MEDICARE [id number]   [id number]   Admitting Diagnosis: CHF (congestive heart failure) [I50.9]  Order Date:  2018               Inpatient Consult to Case Management        (Order ID: [id number])     Diagnosis:         Priority:  Routine Expected Date:   Expiration Date:        Interval:   Count:    Reason for " Consult? wheelchair     Specimen Type:   Specimen Source:   Specimen Taken Date:   Specimen Taken Time:                         Verbal Order Mode: Telephone with readback   Authorizing Provider: Myles Barone MD  Authorizing Provider's NPI: [id number]     Order Entered By: Ksenia Wheeler 1/12/2018  1:47 PM     Electronically signed by:                 History & Physical     No notes of this type exist for this encounter.

## 2018-01-12 NOTE — PROGRESS NOTES
Discharge Planning Assessment   Scot     Patient Name: Tre Kinney  MRN: [id number]  Today's Date: 1/12/2018    Admit Date: 1/5/2018       Discharge Plan       01/12/18 9808    Final Note    Final Note Pt was discharged home today. SS spoke to pt and son who request [facility] and wheelchair from Counts include 234 beds at the [facility]. SS received orders for home health services and wheelchair. SS contacted [facility] per Gayle and faxed information including order to [facility]. Son request w/c to be delivered to pt's home. [facility] notified. SS contacted [facility] per Risa with referral. SS faxed information including AVS to [facility]. Nurse to call report to [facility]. No other needs identified.         Discharge Placement     Facility/Agency Request Status Selected? Address Phone Number Fax Number    [facility]     [address] [place] [address] [phone]        Expected Discharge Date and Time     Expected Discharge Date Expected Discharge Time    Jan 12, 2018           Erin Quevedo

## 2018-01-14 NOTE — H&P
Patient Identification:  Name:  Tre Kinney  Age:  85 y.o.  Sex:  female  :  1932  MRN:  [id number]   Visit Number:  [id number]  Primary Care Physician:  Myles Barone MD       Chief complaint: functional decline secondary to congestive heart failure and chronic kidney disease    History of presenting illness:  85 y.o. female recently transferred to swing bed following bout of congestive heart failure along with acute on chronic kidney disease and just generalized weakness.  Patient admitted to swing bed for further physical therapy and slow diuresis.  ---------------------------------------------------------------------------------------------------------------------   Review Of Systems:  See HPI  ---------------------------------------------------------------------------------------------------------------------   Past Medical History:   Diagnosis Date   • Arthritis    • CHF (congestive heart failure)    • Coronary artery disease    • H/O blood clots    • History of transfusion     no reaction    • Hypertension      Past Surgical History:   Procedure Laterality Date   • ABDOMINAL SURGERY     • APPENDECTOMY     • CARDIAC SURGERY     •  SECTION     • CORONARY ANGIOPLASTY WITH STENT PLACEMENT     • PACEMAKER IMPLANTATION      left side    • SKIN BIOPSY     • TUBAL ABDOMINAL LIGATION       Family History   Problem Relation Age of Onset   • Other Mother      cardiac disorder   • Hypertension Mother    • Heart disease Mother    • Thyroid disease Mother    • Other Father      cardiac disorder   • Heart disease Father    • Hypertension Father    • Other Sister      cardiac disorder   • Other Brother      cardiac disorder   • Other Other      cardiac disorder   • Other Other      cardiac disorder     Social History     Social History   • Marital status:      Spouse name: N/A   • Number of children: N/A   • Years of education: N/A     Social History Main Topics   • Smoking status: Never  Smoker   • Smokeless tobacco: Not on file   • Alcohol use No   • Drug use: No   • Sexual activity: Defer     Other Topics Concern   • Not on file     Social History Narrative     ---------------------------------------------------------------------------------------------------------------------   Allergies:  Latex  ---------------------------------------------------------------------------------------------------------------------   Prior to Admission Medications     Prescriptions Last Dose Informant Patient Reported? Taking?    ALPRAZolam (XANAX) 0.5 MG tablet Unknown Pharmacy Yes No    Take 0.5 mg by mouth At Night As Needed for Anxiety. Ordered to take bid prn, but patient only takes nightly prn.    Cholecalciferol (VITAMIN D3) 5000 units tablet tablet Unknown Pharmacy Yes No    Take 5,000 Units by mouth Daily.    citalopram (CELEXA) 20 MG tablet Unknown Pharmacy Yes No    Take 20 mg by mouth Daily.    furosemide (LASIX) 40 MG tablet Unknown Pharmacy Yes No    Take 60 mg by mouth 2 (Two) Times a Day.    gabapentin (NEURONTIN) 400 MG capsule Unknown Pharmacy Yes No    Take 400 mg by mouth Every Night.    hydrALAZINE (APRESOLINE) 100 MG tablet Unknown Pharmacy No No    Take 1 tablet by mouth Every 8 (Eight) Hours.    HYDROcodone-acetaminophen (NORCO) 7.5-325 MG per tablet Unknown Pharmacy Yes No    Take 1 tablet by mouth 2 (Two) Times a Day As Needed for Moderate Pain .    isosorbide mononitrate (IMDUR) 60 MG 24 hr tablet Unknown Pharmacy Yes No    Take 60 mg by mouth Daily.    metoprolol tartrate (LOPRESSOR) 50 MG tablet Unknown Pharmacy Yes No    Take 50 mg by mouth 2 (Two) Times a Day.    pantoprazole (PROTONIX) 40 MG EC tablet Unknown Pharmacy Yes No    Take 40 mg by mouth Daily.    potassium chloride (MICRO-K) 10 MEQ CR capsule Unknown Pharmacy Yes No    Take 10 mEq by mouth 2 (Two) Times a Day.    warfarin (COUMADIN) 4 MG tablet Unknown Pharmacy Yes No    Take 6 mg by mouth Daily.        Hospital Scheduled  Meds:      No current facility-administered medications for this encounter.   ---------------------------------------------------------------------------------------------------------------------   Vital Signs:     Last 3 weights    01/09/18  0500 01/11/18  0500 01/12/18  0500   Weight: 81.7 kg (180 lb 1.6 oz) 84.8 kg (187 lb) 81.3 kg (179 lb 3.2 oz)     Body mass index is 28.07 kg/(m^2).  ---------------------------------------------------------------------------------------------------------------------   Physical Exam:  Constitutional: alert oriented but generally weak   HENT:  neck supple with no JVD adenopathy or bruits.  Equal reactive measurements intact negative jaundice   Eyes:    Neck:      Cardiovascular:  S1 and S2  Pulmonary/Chest:  Lungs crackles in bases  Abdominal:  Abdomen soft nontender no masses   Musculoskeletal:  Moves all extremities and able to ambulate with assistance for short distance    Neurological:   Skin:    Psychiatric:     ---------------------------------------------------------------------------------------------------------------------      ---------------------------------------------------------------------------------------------------------------------     Results from last 7 days  Lab Units 01/12/18  0440 01/11/18  0447 01/10/18  0342 01/09/18  0024 01/08/18  0153   INR  3.08* 3.07* 3.02* 3.15* 3.77*           Results from last 7 days  Lab Units 01/12/18  0440 01/11/18  0447 01/10/18  0542   SODIUM mmol/L 139 138 137   POTASSIUM mmol/L 4.0 3.8 4.0   CHLORIDE mmol/L 103 102 101   CO2 mmol/L 27.7 28.9 29.7   BUN mg/dL 23* 23* 26*   CREATININE mg/dL 1.67* 1.70* 1.71*   EGFR IF NONAFRICN AM mL/min/1.73 29* 29* 28*   CALCIUM mg/dL 8.5 8.8 8.9   GLUCOSE mg/dL 91 90 93   Estimated Creatinine Clearance: 27 mL/min (by C-G formula based on Cr of 1.67).  No results found for: AMMONIA          No results found for: HGBA1C  Lab Results   Component Value Date    TSH 4.999 (H) 11/10/2017      No results found for: PREGTESTUR, PREGSERUM, HCG, HCGQUANT  Pain Management Panel     Pain Management Panel Latest Ref Rng & Units 12/27/2017 6/10/2017    CREATININE UR mg/dL 50.6 114.0    AMPHETAMINES SCREEN, URINE Negative - -    BARBITURATES SCREEN Negative - -    BENZODIAZEPINE SCREEN, URINE Negative - -    COCAINE SCREEN, URINE Negative - -    METHADONE SCREEN, URINE Negative - -                        ---------------------------------------------------------------------------------------------------------------------  Imaging Results (last 7 days)     Procedure Component Value Units Date/Time    XR Chest 1 View [[id number]] Collected:  01/08/18 0756     Updated:  01/08/18 0759    Narrative:       EXAMINATION: XR CHEST 1 VW-      CLINICAL INDICATION:     pneumonia     TECHNIQUE:  XR CHEST 1 VW-      COMPARISON: 1/2/2018      FINDINGS:   Developing opacities right lower lung. Stable opacities left midlung.   Cardiomegaly and left basilar atelectasis stable.   Left cardiac pacer.   Vascular congestion noted.   No effusion or pneumothorax.            Impression:       Increasing airspace disease right lung with no significant  change in left lung airspace disease. Underlying changes of CHF noted.     This report was finalized on 1/8/2018 7:57 AM by Dr. Andres Guerra MD.             ---------------------------------------------------------------------------------------------------------------------  Assessment and Plan:  1. Volume overload secondary to congestive heart failure  2. AKA on chronic kidney disease for  3.   4.   5.     Myles Barone MD  01/14/18  6:22 PM

## 2018-01-14 NOTE — H&P
Patient Identification:  Name:  Tre Kinney  Age:  85 y.o.  Sex:  female  :  1932  MRN:  [id number]   Visit Number:  [id number]  Primary Care Physician:  Myles Barone MD       Chief complaint: short of bed breath with acute volume overload    History of presenting illness:  85 y.o. female admitted after presenting with increasing shortness of breath +3+4 edema in her extremities very fatigued.  She wears O2 at home and had been slightly hypoxic denied any chest pressure.  ---------------------------------------------------------------------------------------------------------------------   Review Of Systems:  See HPI  ---------------------------------------------------------------------------------------------------------------------   Past Medical History:   Diagnosis Date   • Arthritis    • CHF (congestive heart failure)    • Coronary artery disease    • H/O blood clots    • History of transfusion     no reaction    • Hypertension      Past Surgical History:   Procedure Laterality Date   • ABDOMINAL SURGERY     • APPENDECTOMY     • CARDIAC SURGERY     •  SECTION     • CORONARY ANGIOPLASTY WITH STENT PLACEMENT     • PACEMAKER IMPLANTATION      left side    • SKIN BIOPSY     • TUBAL ABDOMINAL LIGATION       Family History   Problem Relation Age of Onset   • Other Mother      cardiac disorder   • Hypertension Mother    • Heart disease Mother    • Thyroid disease Mother    • Other Father      cardiac disorder   • Heart disease Father    • Hypertension Father    • Other Sister      cardiac disorder   • Other Brother      cardiac disorder   • Other Other      cardiac disorder   • Other Other      cardiac disorder     Social History     Social History   • Marital status:      Spouse name: N/A   • Number of children: N/A   • Years of education: N/A     Social History Main Topics   • Smoking status: Never Smoker   • Smokeless tobacco: None   • Alcohol use No   • Drug use: No   • Sexual  activity: Defer     Other Topics Concern   • None     Social History Narrative     ---------------------------------------------------------------------------------------------------------------------   Allergies:  Latex  ---------------------------------------------------------------------------------------------------------------------   Prior to Admission Medications     Prescriptions Last Dose Informant Patient Reported? Taking?    ALPRAZolam (XANAX) 0.5 MG tablet 12/25/2017 Pharmacy Yes Yes    Take 0.5 mg by mouth At Night As Needed for Anxiety. Ordered to take bid prn, but patient only takes nightly prn.    Cholecalciferol (VITAMIN D3) 5000 units tablet tablet 12/26/2017 Pharmacy Yes Yes    Take 5,000 Units by mouth Daily.    citalopram (CELEXA) 20 MG tablet 12/26/2017 Pharmacy Yes Yes    Take 20 mg by mouth Daily.    furosemide (LASIX) 40 MG tablet 12/26/2017 Pharmacy Yes Yes    Take 60 mg by mouth 2 (Two) Times a Day.    gabapentin (NEURONTIN) 400 MG capsule 12/25/2017 Pharmacy Yes Yes    Take 400 mg by mouth Every Night.    hydrALAZINE (APRESOLINE) 100 MG tablet 12/26/2017 Pharmacy No Yes    Take 1 tablet by mouth Every 8 (Eight) Hours.    HYDROcodone-acetaminophen (NORCO) 7.5-325 MG per tablet 12/26/2017 Pharmacy Yes Yes    Take 1 tablet by mouth 2 (Two) Times a Day As Needed for Moderate Pain .    isosorbide mononitrate (IMDUR) 60 MG 24 hr tablet 12/26/2017 Pharmacy Yes Yes    Take 60 mg by mouth Daily.    metoprolol tartrate (LOPRESSOR) 50 MG tablet 12/26/2017 Pharmacy Yes Yes    Take 50 mg by mouth 2 (Two) Times a Day.    pantoprazole (PROTONIX) 40 MG EC tablet 12/26/2017 Pharmacy Yes Yes    Take 40 mg by mouth Daily.    potassium chloride (MICRO-K) 10 MEQ CR capsule 12/26/2017 Pharmacy Yes Yes    Take 10 mEq by mouth 2 (Two) Times a Day.    warfarin (COUMADIN) 4 MG tablet 12/26/2017 Pharmacy Yes Yes    Take 6 mg by mouth Daily.        Hospital Scheduled Meds:      No current facility-administered  medications for this encounter.   ---------------------------------------------------------------------------------------------------------------------   Vital Signs:     Last 3 weights    01/03/18  0330 01/04/18  0323 01/05/18  0310   Weight: 84 kg (185 lb 3.2 oz) 84.3 kg (185 lb 14.4 oz) 87.2 kg (192 lb 3.2 oz)     Body mass index is 30.1 kg/(m^2).  ---------------------------------------------------------------------------------------------------------------------   Physical Exam:  Constitutional: aalert and looks like she does not feel well and subjective shortness of breath   HENT:  no JVD  Eyes:    Neck:      Cardiovascular:  S1 and S2  Pulmonary/Chest:  Bilateral crackles in bases  Abdominal:  aabdomen soft nontender   Musculoskeletal:  All extremities    Neurological: no focal deficits  Skin:   Psychiatric:     ---------------------------------------------------------------------------------------------------------------------      ---------------------------------------------------------------------------------------------------------------------     Results from last 7 days  Lab Units 01/12/18 0440 01/11/18  0447 01/10/18  0342 01/09/18  0024 01/08/18  0153   INR  3.08* 3.07* 3.02* 3.15* 3.77*           Results from last 7 days  Lab Units 01/12/18  0440 01/11/18  0447 01/10/18  0542   SODIUM mmol/L 139 138 137   POTASSIUM mmol/L 4.0 3.8 4.0   CHLORIDE mmol/L 103 102 101   CO2 mmol/L 27.7 28.9 29.7   BUN mg/dL 23* 23* 26*   CREATININE mg/dL 1.67* 1.70* 1.71*   EGFR IF NONAFRICN AM mL/min/1.73 29* 29* 28*   CALCIUM mg/dL 8.5 8.8 8.9   GLUCOSE mg/dL 91 90 93   Estimated Creatinine Clearance: 27.9 mL/min (by C-G formula based on Cr of 1.67).  No results found for: AMMONIA          No results found for: HGBA1C  Lab Results   Component Value Date    TSH 4.999 (H) 11/10/2017     No results found for: PREGTESTUR, PREGSERUM, HCG, HCGQUANT  Pain Management Panel     Pain Management Panel Latest Ref Rng & Units  12/27/2017 6/10/2017    CREATININE UR mg/dL 50.6 114.0    AMPHETAMINES SCREEN, URINE Negative - -    BARBITURATES SCREEN Negative - -    BENZODIAZEPINE SCREEN, URINE Negative - -    COCAINE SCREEN, URINE Negative - -    METHADONE SCREEN, URINE Negative - -                        ---------------------------------------------------------------------------------------------------------------------  Imaging Results (last 7 days)     Procedure Component Value Units Date/Time    XR Chest PA & Lateral [[id number]] Collected:  12/31/17 1850     Updated:  12/31/17 1852    Narrative:       XR CHEST PA AND LATERAL-      CLINICAL INDICATION: Cough.          COMPARISON: 12/27/2017.      TECHNIQUE: XR CHEST PA AND LATERAL-      FINDINGS:   Evidence of small left effusion.   Heart and mediastinal contours are unremarkable.   No pneumothorax.   Bony and soft tissue structures are unremarkable.            Impression:       Small left effusion.     This report was finalized on 12/31/2017 6:50 PM by Dr. Bob Lovell MD.       XR Chest 1 View [[id number]] Collected:  01/02/18 1031     Updated:  01/02/18 1034    Narrative:       XR CHEST 1 VW-     CLINICAL INDICATION: difficulty breathing          COMPARISON: 12/31/2017      TECHNIQUE: Single frontal view of the chest.     FINDINGS:     Left effusion and left-sided consolidation  The cardiac silhouette is normal. The pulmonary vasculature is  unremarkable.  There is no evidence of an acute osseous abnormality.   There are no suspicious-appearing parenchymal soft tissue nodules.            Impression:       Left effusion and left-sided consolidation         This report was finalized on 1/2/2018 10:32 AM by Dr. Bob Lovell MD.             ---------------------------------------------------------------------------------------------------------------------  Assessment and Plan:  1. Volume overload secondary to CHF plans to admit and diurese  2. Chronic kidney disease with acute on chronic  disease  3. COPD  4.   5.     Myles Barone MD  01/14/18  6:18 PM

## 2018-01-25 NOTE — DISCHARGE SUMMARY
Patient Identification:  Name:  Tre Kinney  Age:  85 y.o.  Sex:  female  :  1932  MRN:  [id number]  Visit Number:  [id number]    Date of Admission: 2017  Date of Discharge:  2018     PCP: Myles Barone MD    Discharge Diagnosis: volume overload and chronic kidney disease      CONSULTS :       PROCEDURES PERFORMED        HOSPITAL COURSE  Patient is a 85 y.o. female presented to [facility] complaining of complaining of increasing shortness of breath and swelling in her legs.  She's lost her appetite over the past 4-5 days has hard time breathing even just sitting up though her oxygen saturation good is obviously fluid overloaded and was admitted for IV diuresis.over the course of her stay she continued to do well but remained the function declined and had a hard time his forefoot able to get up and ambulate.  This point the patient decided to put the patient in swing bed continue diuresis and for further physical therapy.  Please see the admitting history and physical for further details.      VITAL SIGNS:  Last 3 weights    18  0330 18  0323 18  0310   Weight: 84 kg (185 lb 3.2 oz) 84.3 kg (185 lb 14.4 oz) 87.2 kg (192 lb 3.2 oz)     Body mass index is 30.1 kg/(m^2).        DISCHARGE DISPOSITION   Stable    DISCHARGE MEDICATIONS:   Tre Kinney   Home Medication Instructions ALVARO:[id number]    Printed on:18   Medication Information                      ALPRAZolam (XANAX) 0.5 MG tablet  Take 0.5 mg by mouth At Night As Needed for Anxiety. Ordered to take bid prn, but patient only takes nightly prn.             Cholecalciferol (VITAMIN D3) 5000 units tablet tablet  Take 5,000 Units by mouth Daily.             citalopram (CELEXA) 20 MG tablet  Take 20 mg by mouth Daily.             furosemide (LASIX) 40 MG tablet  Take 60 mg by mouth 2 (Two) Times a Day.             gabapentin (NEURONTIN) 400 MG capsule  Take 400 mg by mouth Every Night.              hydrALAZINE (APRESOLINE) 100 MG tablet  Take 1 tablet by mouth Every 8 (Eight) Hours.             HYDROcodone-acetaminophen (NORCO) 7.5-325 MG per tablet  Take 1 tablet by mouth 2 (Two) Times a Day As Needed for Moderate Pain .             isosorbide mononitrate (IMDUR) 60 MG 24 hr tablet  Take 60 mg by mouth Daily.             metoprolol tartrate (LOPRESSOR) 50 MG tablet  Take 50 mg by mouth 2 (Two) Times a Day.             pantoprazole (PROTONIX) 40 MG EC tablet  Take 40 mg by mouth Daily.             potassium chloride (MICRO-K) 10 MEQ CR capsule  Take 10 mEq by mouth 2 (Two) Times a Day.             warfarin (COUMADIN) 4 MG tablet  Take 6 mg by mouth Daily.                   Your Scheduled Appointments     Feb 27, 2018 11:00 AM EST   Follow Up with Nba Arora MD   [facility] CARDIOLOGY (--)    [address] [facility] [id number] [phone]           Arrive 15 minutes prior to appointment.                  Follow-up Information     Follow up with Myles Barone MD .    Specialty:  Family Medicine    Contact information:    [address] [facility] [address]  [phone]               Myles Barone MD  01/24/18  8:48 PM

## 2018-01-28 NOTE — DISCHARGE SUMMARY
Patient Identification:  Name:  Tre Kinney  Age:  85 y.o.  Sex:  female  :  1932  MRN:  [id number]  Visit Number:  [id number]    Date of Admission: 2018  Date of Discharge:  2018     PCP: Myles Barone MD  Functional decline/congestive heart failure/kidney disease      CONSULTS :       PROCEDURES PERFORMED        HOSPITAL COURSE  Patient is a 85 y.o. female presented to [facility] complaining of increasing fatigue and shortness of breath.  She was admitted from acute care to swing bed for physical therapy functional decline.  History of chronic kidney disease congestive heart failure and our deficiency anemia.  She did well through her stay with improving physical therapy was ambulating with assistance distort was doing well up at baseline.  She will be discharged home RETURN to clinic in 1 week  Please see the admitting history and physical for further details.      VITAL SIGNS:  Last 3 weights    18  0500 18  0500 18  0500   Weight: 81.7 kg (180 lb 1.6 oz) 84.8 kg (187 lb) 81.3 kg (179 lb 3.2 oz)     Body mass index is 28.07 kg/(m^2).        DISCHARGE DISPOSITION   Stable    DISCHARGE MEDICATIONS:   Tre Kinney   Home Medication Instructions ALVARO:[id number]    Printed on:18 0845   Medication Information                      ALPRAZolam (XANAX) 0.5 MG tablet  Take 0.5 mg by mouth At Night As Needed for Anxiety. Ordered to take bid prn, but patient only takes nightly prn.             Cholecalciferol (VITAMIN D3) 5000 units tablet tablet  Take 5,000 Units by mouth Daily.             citalopram (CELEXA) 20 MG tablet  Take 20 mg by mouth Daily.             furosemide (LASIX) 40 MG tablet  Take 60 mg by mouth 2 (Two) Times a Day.             gabapentin (NEURONTIN) 400 MG capsule  Take 400 mg by mouth Every Night.             hydrALAZINE (APRESOLINE) 100 MG tablet  Take 1 tablet by mouth Every 8 (Eight) Hours.             HYDROcodone-acetaminophen (NORCO)  7.5-325 MG per tablet  Take 1 tablet by mouth 2 (Two) Times a Day As Needed for Moderate Pain .             isosorbide mononitrate (IMDUR) 60 MG 24 hr tablet  Take 60 mg by mouth Daily.             metoprolol tartrate (LOPRESSOR) 50 MG tablet  Take 50 mg by mouth 2 (Two) Times a Day.             pantoprazole (PROTONIX) 40 MG EC tablet  Take 40 mg by mouth Daily.             potassium chloride (MICRO-K) 10 MEQ CR capsule  Take 10 mEq by mouth 2 (Two) Times a Day.             warfarin (COUMADIN) 4 MG tablet  Take 6 mg by mouth Daily.                   Your Scheduled Appointments     Feb 27, 2018 11:00 AM EST   Follow Up with bNa Arora MD   [facility] CARDIOLOGY (--)    [address] [facility] [id number] [phone]           Arrive 15 minutes prior to appointment.            Additional instructions:      You have a scheduled follow-up appointment with Dr. Mcgowan on 1/19/18 at 11:40am. Office Number [phone]                  Additional Instructions for the Follow-ups that You Need to Schedule     Ambulatory Referral to Home Health    As directed    Face to Face Visit Date:  1/12/2018    Follow-up Provider for Plan of Care?:  I will be treating the patient on an ongoing basis.  Please send me the Plan of Care for signature.    Follow-up Provider:  MYLES MCGOWAN [[id number]]    Reason/Clinical Findings:  weakness    Describe mobility limitations that make leaving home difficult:  weakness    Nursing/Therapeutic Services Requested:  Skilled Nursing Physical Therapy Occupational Therapy    Skilled nursing orders:  Other    Frequency:  1 Week 1                 Follow-up Information     Follow up with Myles Mcgowan MD. Go on 1/19/2018.    Specialty:  Family Medicine    Why:  Follow up appointment at 11:40AM    Contact information:    [address] [place] [address]  [phone]               Myles Mcgowan MD  01/28/18  8:45 AM

## 2018-03-05 ENCOUNTER — HOSPITAL ENCOUNTER (EMERGENCY)
Facility: HOSPITAL | Age: 83
Discharge: ANOTHER HOSPITAL | End: 2018-03-05
Attending: EMERGENCY MEDICINE | Admitting: EMERGENCY MEDICINE

## 2018-03-05 ENCOUNTER — HOSPITAL ENCOUNTER (INPATIENT)
Facility: HOSPITAL | Age: 83
LOS: 3 days | Discharge: HOME HEALTH | End: 2018-03-08
Attending: INTERNAL MEDICINE | Admitting: INTERNAL MEDICINE

## 2018-03-05 ENCOUNTER — APPOINTMENT (OUTPATIENT)
Dept: GENERAL RADIOLOGY | Facility: HOSPITAL | Age: 83
End: 2018-03-05

## 2018-03-05 DIAGNOSIS — N18.9 ACUTE RENAL FAILURE SUPERIMPOSED ON CHRONIC KIDNEY DISEASE, UNSPECIFIED CKD STAGE, UNSPECIFIED ACUTE RENAL FAILURE TYPE (HCC): ICD-10-CM

## 2018-03-05 DIAGNOSIS — N17.9 ACUTE RENAL FAILURE SUPERIMPOSED ON CHRONIC KIDNEY DISEASE, UNSPECIFIED CKD STAGE, UNSPECIFIED ACUTE RENAL FAILURE TYPE (HCC): ICD-10-CM

## 2018-03-05 DIAGNOSIS — K92.2 GASTROINTESTINAL HEMORRHAGE, UNSPECIFIED GASTROINTESTINAL HEMORRHAGE TYPE: ICD-10-CM

## 2018-03-05 DIAGNOSIS — D62 ACUTE BLOOD LOSS ANEMIA: ICD-10-CM

## 2018-03-05 DIAGNOSIS — K92.1 HEMATOCHEZIA: Primary | ICD-10-CM

## 2018-03-05 DIAGNOSIS — K92.2 ACUTE GI BLEEDING: Primary | ICD-10-CM

## 2018-03-05 PROBLEM — Z79.01 CHRONIC ANTICOAGULATION: Status: ACTIVE | Noted: 2018-03-05

## 2018-03-05 LAB
ABO GROUP BLD: NORMAL
ABO GROUP BLD: NORMAL
ALBUMIN SERPL-MCNC: 3.9 G/DL (ref 3.4–4.8)
ALBUMIN/GLOB SERPL: 1.6 G/DL (ref 1.5–2.5)
ALP SERPL-CCNC: 49 U/L (ref 35–104)
ALT SERPL W P-5'-P-CCNC: 24 U/L (ref 10–36)
ANION GAP SERPL CALCULATED.3IONS-SCNC: 12.4 MMOL/L (ref 3.6–11.2)
APTT PPP: 37.3 SECONDS (ref 24–31)
APTT PPP: 41.9 SECONDS (ref 23.8–36.1)
AST SERPL-CCNC: 25 U/L (ref 10–30)
BASOPHILS # BLD AUTO: 0.04 10*3/MM3 (ref 0–0.2)
BASOPHILS # BLD AUTO: 0.04 10*3/MM3 (ref 0–0.3)
BASOPHILS # BLD AUTO: 0.05 10*3/MM3 (ref 0–0.3)
BASOPHILS NFR BLD AUTO: 0.7 % (ref 0–2)
BASOPHILS NFR BLD AUTO: 0.7 % (ref 0–2)
BASOPHILS NFR BLD AUTO: 0.8 % (ref 0–1)
BILIRUB SERPL-MCNC: 0.5 MG/DL (ref 0.2–1.8)
BLD GP AB SCN SERPL QL: NEGATIVE
BLD GP AB SCN SERPL QL: NEGATIVE
BUN BLD-MCNC: 62 MG/DL (ref 7–21)
BUN/CREAT SERPL: 26.6 (ref 7–25)
CALCIUM SPEC-SCNC: 8.5 MG/DL (ref 7.7–10)
CHLORIDE SERPL-SCNC: 102 MMOL/L (ref 99–112)
CK MB SERPL-CCNC: 2.31 NG/ML (ref 0–5)
CK MB SERPL-RTO: 4.9 % (ref 0–3)
CK SERPL-CCNC: 47 U/L (ref 24–173)
CO2 SERPL-SCNC: 20.6 MMOL/L (ref 24.3–31.9)
CREAT BLD-MCNC: 2.33 MG/DL (ref 0.43–1.29)
DEPRECATED RDW RBC AUTO: 62.7 FL (ref 37–54)
DEPRECATED RDW RBC AUTO: 65.4 FL (ref 37–54)
DEPRECATED RDW RBC AUTO: 67.4 FL (ref 37–54)
EOSINOPHIL # BLD AUTO: 0.2 10*3/MM3 (ref 0–0.3)
EOSINOPHIL # BLD AUTO: 0.2 10*3/MM3 (ref 0–0.7)
EOSINOPHIL # BLD AUTO: 0.22 10*3/MM3 (ref 0–0.7)
EOSINOPHIL NFR BLD AUTO: 3.3 % (ref 0–7)
EOSINOPHIL NFR BLD AUTO: 3.5 % (ref 0–7)
EOSINOPHIL NFR BLD AUTO: 3.8 % (ref 0–3)
ERYTHROCYTE [DISTWIDTH] IN BLOOD BY AUTOMATED COUNT: 20.3 % (ref 11.3–14.5)
ERYTHROCYTE [DISTWIDTH] IN BLOOD BY AUTOMATED COUNT: 20.6 % (ref 11.5–14.5)
ERYTHROCYTE [DISTWIDTH] IN BLOOD BY AUTOMATED COUNT: 20.9 % (ref 11.5–14.5)
GFR SERPL CREATININE-BSD FRML MDRD: 20 ML/MIN/1.73
GLOBULIN UR ELPH-MCNC: 2.4 GM/DL
GLUCOSE BLD-MCNC: 94 MG/DL (ref 70–110)
HCT VFR BLD AUTO: 24.7 % (ref 34.5–44)
HCT VFR BLD AUTO: 24.9 % (ref 37–47)
HCT VFR BLD AUTO: 25.9 % (ref 37–47)
HGB BLD-MCNC: 7.9 G/DL (ref 11.5–15.5)
HGB BLD-MCNC: 7.9 G/DL (ref 12–16)
HGB BLD-MCNC: 7.9 G/DL (ref 12–16)
HOLD SPECIMEN: NORMAL
IMM GRANULOCYTES # BLD: 0.01 10*3/MM3 (ref 0–0.03)
IMM GRANULOCYTES # BLD: 0.01 10*3/MM3 (ref 0–0.03)
IMM GRANULOCYTES # BLD: 0.02 10*3/MM3 (ref 0–0.03)
IMM GRANULOCYTES NFR BLD: 0.2 % (ref 0–0.5)
IMM GRANULOCYTES NFR BLD: 0.2 % (ref 0–0.6)
IMM GRANULOCYTES NFR BLD: 0.3 % (ref 0–0.5)
INR PPP: 2.38 (ref 0.91–1.09)
INR PPP: 3.18 (ref 0.9–1.1)
LYMPHOCYTES # BLD AUTO: 0.73 10*3/MM3 (ref 1–3)
LYMPHOCYTES # BLD AUTO: 0.74 10*3/MM3 (ref 1–3)
LYMPHOCYTES # BLD AUTO: 0.84 10*3/MM3 (ref 0.6–4.8)
LYMPHOCYTES NFR BLD AUTO: 10.8 % (ref 16–46)
LYMPHOCYTES NFR BLD AUTO: 12.8 % (ref 16–46)
LYMPHOCYTES NFR BLD AUTO: 16.1 % (ref 24–44)
MCH RBC QN AUTO: 27.1 PG (ref 27–33)
MCH RBC QN AUTO: 27.8 PG (ref 27–31)
MCH RBC QN AUTO: 27.9 PG (ref 27–33)
MCHC RBC AUTO-ENTMCNC: 30.5 G/DL (ref 33–37)
MCHC RBC AUTO-ENTMCNC: 31.7 G/DL (ref 33–37)
MCHC RBC AUTO-ENTMCNC: 32 G/DL (ref 32–36)
MCV RBC AUTO: 87 FL (ref 80–99)
MCV RBC AUTO: 88 FL (ref 80–94)
MCV RBC AUTO: 88.7 FL (ref 80–94)
MONOCYTES # BLD AUTO: 0.47 10*3/MM3 (ref 0–1)
MONOCYTES # BLD AUTO: 0.49 10*3/MM3 (ref 0.1–0.9)
MONOCYTES # BLD AUTO: 0.65 10*3/MM3 (ref 0.1–0.9)
MONOCYTES NFR BLD AUTO: 8.5 % (ref 0–12)
MONOCYTES NFR BLD AUTO: 9 % (ref 0–12)
MONOCYTES NFR BLD AUTO: 9.6 % (ref 0–12)
NEUTROPHILS # BLD AUTO: 3.67 10*3/MM3 (ref 1.5–8.3)
NEUTROPHILS # BLD AUTO: 4.29 10*3/MM3 (ref 1.4–6.5)
NEUTROPHILS # BLD AUTO: 5.07 10*3/MM3 (ref 1.4–6.5)
NEUTROPHILS NFR BLD AUTO: 70.1 % (ref 41–71)
NEUTROPHILS NFR BLD AUTO: 74.3 % (ref 40–75)
NEUTROPHILS NFR BLD AUTO: 75.3 % (ref 40–75)
NRBC BLD MANUAL-RTO: 0 /100 WBC (ref 0–0)
NRBC BLD MANUAL-RTO: 0 /100 WBC (ref 0–0)
OSMOLALITY SERPL CALC.SUM OF ELEC: 287.5 MOSM/KG (ref 273–305)
PLATELET # BLD AUTO: 156 10*3/MM3 (ref 150–450)
PLATELET # BLD AUTO: 188 10*3/MM3 (ref 130–400)
PLATELET # BLD AUTO: 199 10*3/MM3 (ref 130–400)
PMV BLD AUTO: 10 FL (ref 6–10)
PMV BLD AUTO: 9.6 FL (ref 6–12)
PMV BLD AUTO: 9.9 FL (ref 6–10)
POTASSIUM BLD-SCNC: 3.8 MMOL/L (ref 3.5–5.3)
PROT SERPL-MCNC: 6.3 G/DL (ref 6–8)
PROTHROMBIN TIME: 25 SECONDS (ref 9.6–11.5)
PROTHROMBIN TIME: 33 SECONDS (ref 11–15.4)
RBC # BLD AUTO: 2.83 10*6/MM3 (ref 4.2–5.4)
RBC # BLD AUTO: 2.84 10*6/MM3 (ref 3.89–5.14)
RBC # BLD AUTO: 2.92 10*6/MM3 (ref 4.2–5.4)
RH BLD: NEGATIVE
RH BLD: NEGATIVE
SODIUM BLD-SCNC: 135 MMOL/L (ref 135–153)
TROPONIN I SERPL-MCNC: 0.04 NG/ML
WBC NRBC COR # BLD: 5.23 10*3/MM3 (ref 3.5–10.8)
WBC NRBC COR # BLD: 5.77 10*3/MM3 (ref 4.5–12.5)
WBC NRBC COR # BLD: 6.74 10*3/MM3 (ref 4.5–12.5)
WHOLE BLOOD HOLD SPECIMEN: NORMAL
WHOLE BLOOD HOLD SPECIMEN: NORMAL

## 2018-03-05 PROCEDURE — 86850 RBC ANTIBODY SCREEN: CPT | Performed by: INTERNAL MEDICINE

## 2018-03-05 PROCEDURE — 93010 ELECTROCARDIOGRAM REPORT: CPT | Performed by: INTERNAL MEDICINE

## 2018-03-05 PROCEDURE — 93005 ELECTROCARDIOGRAM TRACING: CPT | Performed by: NURSE PRACTITIONER

## 2018-03-05 PROCEDURE — 86850 RBC ANTIBODY SCREEN: CPT | Performed by: EMERGENCY MEDICINE

## 2018-03-05 PROCEDURE — P9017 PLASMA 1 DONOR FRZ W/IN 8 HR: HCPCS

## 2018-03-05 PROCEDURE — 85610 PROTHROMBIN TIME: CPT | Performed by: EMERGENCY MEDICINE

## 2018-03-05 PROCEDURE — 96365 THER/PROPH/DIAG IV INF INIT: CPT

## 2018-03-05 PROCEDURE — 85730 THROMBOPLASTIN TIME PARTIAL: CPT | Performed by: INTERNAL MEDICINE

## 2018-03-05 PROCEDURE — 86900 BLOOD TYPING SEROLOGIC ABO: CPT | Performed by: EMERGENCY MEDICINE

## 2018-03-05 PROCEDURE — 86923 COMPATIBILITY TEST ELECTRIC: CPT

## 2018-03-05 PROCEDURE — 99284 EMERGENCY DEPT VISIT MOD MDM: CPT

## 2018-03-05 PROCEDURE — 80053 COMPREHEN METABOLIC PANEL: CPT | Performed by: EMERGENCY MEDICINE

## 2018-03-05 PROCEDURE — 36430 TRANSFUSION BLD/BLD COMPNT: CPT

## 2018-03-05 PROCEDURE — 96376 TX/PRO/DX INJ SAME DRUG ADON: CPT

## 2018-03-05 PROCEDURE — 93005 ELECTROCARDIOGRAM TRACING: CPT | Performed by: EMERGENCY MEDICINE

## 2018-03-05 PROCEDURE — 84484 ASSAY OF TROPONIN QUANT: CPT | Performed by: EMERGENCY MEDICINE

## 2018-03-05 PROCEDURE — 85730 THROMBOPLASTIN TIME PARTIAL: CPT | Performed by: EMERGENCY MEDICINE

## 2018-03-05 PROCEDURE — 84484 ASSAY OF TROPONIN QUANT: CPT | Performed by: INTERNAL MEDICINE

## 2018-03-05 PROCEDURE — 82550 ASSAY OF CK (CPK): CPT | Performed by: EMERGENCY MEDICINE

## 2018-03-05 PROCEDURE — 86900 BLOOD TYPING SEROLOGIC ABO: CPT | Performed by: INTERNAL MEDICINE

## 2018-03-05 PROCEDURE — 25010000002 VITAMIN K1 PER 1 MG: Performed by: EMERGENCY MEDICINE

## 2018-03-05 PROCEDURE — 86901 BLOOD TYPING SEROLOGIC RH(D): CPT | Performed by: INTERNAL MEDICINE

## 2018-03-05 PROCEDURE — 85025 COMPLETE CBC W/AUTO DIFF WBC: CPT | Performed by: EMERGENCY MEDICINE

## 2018-03-05 PROCEDURE — 71045 X-RAY EXAM CHEST 1 VIEW: CPT | Performed by: RADIOLOGY

## 2018-03-05 PROCEDURE — 99223 1ST HOSP IP/OBS HIGH 75: CPT | Performed by: INTERNAL MEDICINE

## 2018-03-05 PROCEDURE — 85025 COMPLETE CBC W/AUTO DIFF WBC: CPT | Performed by: INTERNAL MEDICINE

## 2018-03-05 PROCEDURE — 86901 BLOOD TYPING SEROLOGIC RH(D): CPT | Performed by: EMERGENCY MEDICINE

## 2018-03-05 PROCEDURE — 86927 PLASMA FRESH FROZEN: CPT

## 2018-03-05 PROCEDURE — 96372 THER/PROPH/DIAG INJ SC/IM: CPT

## 2018-03-05 PROCEDURE — 96366 THER/PROPH/DIAG IV INF ADDON: CPT

## 2018-03-05 PROCEDURE — 36415 COLL VENOUS BLD VENIPUNCTURE: CPT

## 2018-03-05 PROCEDURE — 71045 X-RAY EXAM CHEST 1 VIEW: CPT

## 2018-03-05 PROCEDURE — 85610 PROTHROMBIN TIME: CPT | Performed by: INTERNAL MEDICINE

## 2018-03-05 PROCEDURE — 82553 CREATINE MB FRACTION: CPT | Performed by: EMERGENCY MEDICINE

## 2018-03-05 RX ORDER — CITALOPRAM 20 MG/1
20 TABLET ORAL DAILY
Status: DISCONTINUED | OUTPATIENT
Start: 2018-03-06 | End: 2018-03-08 | Stop reason: HOSPADM

## 2018-03-05 RX ORDER — SODIUM CHLORIDE 0.9 % (FLUSH) 0.9 %
10 SYRINGE (ML) INJECTION AS NEEDED
Status: DISCONTINUED | OUTPATIENT
Start: 2018-03-05 | End: 2018-03-05 | Stop reason: HOSPADM

## 2018-03-05 RX ORDER — PANTOPRAZOLE SODIUM 40 MG/1
40 TABLET, DELAYED RELEASE ORAL
Status: DISCONTINUED | OUTPATIENT
Start: 2018-03-06 | End: 2018-03-06 | Stop reason: ALTCHOICE

## 2018-03-05 RX ORDER — SODIUM CHLORIDE 0.9 % (FLUSH) 0.9 %
1-10 SYRINGE (ML) INJECTION AS NEEDED
Status: DISCONTINUED | OUTPATIENT
Start: 2018-03-05 | End: 2018-03-08 | Stop reason: HOSPADM

## 2018-03-05 RX ORDER — ISOSORBIDE MONONITRATE 60 MG/1
60 TABLET, EXTENDED RELEASE ORAL DAILY
Status: DISCONTINUED | OUTPATIENT
Start: 2018-03-06 | End: 2018-03-08 | Stop reason: HOSPADM

## 2018-03-05 RX ORDER — PHYTONADIONE 10 MG/ML
5 INJECTION, EMULSION INTRAMUSCULAR; INTRAVENOUS; SUBCUTANEOUS ONCE
Status: COMPLETED | OUTPATIENT
Start: 2018-03-05 | End: 2018-03-05

## 2018-03-05 RX ORDER — METOPROLOL TARTRATE 50 MG/1
50 TABLET, FILM COATED ORAL DAILY
Status: DISCONTINUED | OUTPATIENT
Start: 2018-03-06 | End: 2018-03-06

## 2018-03-05 RX ORDER — ASPIRIN 325 MG
325 TABLET ORAL ONCE
Status: DISCONTINUED | OUTPATIENT
Start: 2018-03-05 | End: 2018-03-05

## 2018-03-05 RX ORDER — GABAPENTIN 400 MG/1
400 CAPSULE ORAL NIGHTLY
Status: DISCONTINUED | OUTPATIENT
Start: 2018-03-06 | End: 2018-03-08 | Stop reason: HOSPADM

## 2018-03-05 RX ORDER — PANTOPRAZOLE SODIUM 40 MG/10ML
80 INJECTION, POWDER, LYOPHILIZED, FOR SOLUTION INTRAVENOUS ONCE
Status: COMPLETED | OUTPATIENT
Start: 2018-03-05 | End: 2018-03-05

## 2018-03-05 RX ORDER — SODIUM CHLORIDE 9 MG/ML
INJECTION, SOLUTION INTRAVENOUS
Status: DISCONTINUED
Start: 2018-03-05 | End: 2018-03-05 | Stop reason: HOSPADM

## 2018-03-05 RX ORDER — ALPRAZOLAM 0.5 MG/1
0.5 TABLET ORAL NIGHTLY PRN
Status: DISCONTINUED | OUTPATIENT
Start: 2018-03-05 | End: 2018-03-08 | Stop reason: HOSPADM

## 2018-03-05 RX ORDER — HYDRALAZINE HYDROCHLORIDE 50 MG/1
100 TABLET, FILM COATED ORAL EVERY 8 HOURS SCHEDULED
Status: DISCONTINUED | OUTPATIENT
Start: 2018-03-06 | End: 2018-03-08 | Stop reason: HOSPADM

## 2018-03-05 RX ORDER — PANTOPRAZOLE SODIUM 40 MG/10ML
40 INJECTION, POWDER, LYOPHILIZED, FOR SOLUTION INTRAVENOUS
Status: DISCONTINUED | OUTPATIENT
Start: 2018-03-06 | End: 2018-03-06

## 2018-03-05 RX ORDER — HYDROCODONE BITARTRATE AND ACETAMINOPHEN 7.5; 325 MG/1; MG/1
1 TABLET ORAL 2 TIMES DAILY PRN
Status: DISCONTINUED | OUTPATIENT
Start: 2018-03-05 | End: 2018-03-08 | Stop reason: HOSPADM

## 2018-03-05 RX ORDER — SODIUM CHLORIDE 9 MG/ML
100 INJECTION, SOLUTION INTRAVENOUS CONTINUOUS
Status: DISCONTINUED | OUTPATIENT
Start: 2018-03-05 | End: 2018-03-07

## 2018-03-05 RX ADMIN — PHYTONADIONE 5 MG: 10 INJECTION, EMULSION INTRAMUSCULAR; INTRAVENOUS; SUBCUTANEOUS at 15:53

## 2018-03-05 RX ADMIN — PANTOPRAZOLE SODIUM 80 MG: 40 INJECTION, POWDER, FOR SOLUTION INTRAVENOUS at 15:52

## 2018-03-05 RX ADMIN — PANTOPRAZOLE SODIUM 8 MG/HR: 40 INJECTION, POWDER, FOR SOLUTION INTRAVENOUS at 16:07

## 2018-03-05 RX ADMIN — SODIUM CHLORIDE 100 ML/HR: 9 INJECTION, SOLUTION INTRAVENOUS at 22:14

## 2018-03-05 NOTE — ED NOTES
"Due to high pt volume, pt to er lobby while awaiting room placement at this time, pt denies any rectal bleeding at this time, states currently only having with bowel movement, pt denies chest pain currently states just feels \"something that just doesn't feel right\" pt instr. To notify staff of any changes and pt verb understanding, will continue to monitor pt, pt awaiting in lobby with family member, card. Protocol has been initiated     Nae Krishna RN  03/05/18 1314    "

## 2018-03-05 NOTE — ED PROVIDER NOTES
"Subjective   HPI Comments: 85-year-old female presents with a four-day history of GI bleed.  She describes it as both coffee ground and bright red.  The patient reports that she has not had significant GI bleeding previously, although she does report she has a remote history of ulcer disease and polyps.  She has had many colonoscopies before.  The patient states that she also has had some chest pain that began this morning.  Patient has a history of CAD, has a pacemaker.  She is on Coumadin, she states to \"prevent strokes\" and has been on Coumadin for many years.  She reports no mucosal bleeding or hematuria.  Patient is chest pain-free currently.      History provided by:  Patient      Review of Systems   Constitutional: Negative for activity change and appetite change.   HENT: Negative for congestion and nosebleeds.    Eyes: Negative.    Respiratory: Negative for shortness of breath.    Cardiovascular: Positive for chest pain.   Gastrointestinal: Positive for blood in stool. Negative for abdominal pain, nausea and vomiting.   Genitourinary: Negative for hematuria and vaginal bleeding.   Musculoskeletal: Negative for myalgias.   Skin: Negative for pallor, rash and wound.   Neurological: Positive for dizziness.   Hematological: Bruises/bleeds easily.       Past Medical History:   Diagnosis Date   • Arthritis    • CHF (congestive heart failure)    • Coronary artery disease    • H/O blood clots    • History of transfusion     no reaction    • Hypertension        Allergies   Allergen Reactions   • Latex        Past Surgical History:   Procedure Laterality Date   • ABDOMINAL SURGERY     • APPENDECTOMY     • CARDIAC SURGERY     •  SECTION     • CORONARY ANGIOPLASTY WITH STENT PLACEMENT     • PACEMAKER IMPLANTATION      left side    • SKIN BIOPSY     • TUBAL ABDOMINAL LIGATION         Family History   Problem Relation Age of Onset   • Other Mother      cardiac disorder   • Hypertension Mother    • Heart disease " Mother    • Thyroid disease Mother    • Other Father      cardiac disorder   • Heart disease Father    • Hypertension Father    • Other Sister      cardiac disorder   • Other Brother      cardiac disorder   • Other Other      cardiac disorder   • Other Other      cardiac disorder       Social History     Social History   • Marital status:      Social History Main Topics   • Smoking status: Never Smoker   • Alcohol use No   • Drug use: No   • Sexual activity: Defer           Objective   Physical Exam   Constitutional: She is oriented to person, place, and time. She appears well-developed and well-nourished.   HENT:   Head: Normocephalic and atraumatic.   Left Ear: External ear normal.   Mouth/Throat: Oropharynx is clear and moist.   Eyes: Conjunctivae are normal.   Neck: Neck supple.   Cardiovascular: Normal rate, regular rhythm and normal heart sounds.    Pulmonary/Chest: Effort normal.   Abdominal: Soft. There is no tenderness. There is no rebound and no guarding.   Genitourinary:   Genitourinary Comments: Hemoccult is positive, the rectal exam is a combination of melanotic stool and bright red blood   Musculoskeletal: She exhibits no edema.   Neurological: She is alert and oriented to person, place, and time.   Skin: She is not diaphoretic.       Procedures         ED Course  ED Course   Value Comment By Time   Eosinophil %: 3.3 (Reviewed) Megan Alves DO 03/05 [id number] discussed with Dr. Spurling, GI at [facility], who is a GI doctor. He states he would  Be happy to consult. Will discuss with the hospitalist     1661 Dr. Steve accepts patient to [facility]. Await bed. Will recheck Hb, and consider blood transfusion hb drops               MDM  Number of Diagnoses or Management Options  Acute blood loss anemia:   Acute GI bleeding:   Acute renal failure superimposed on chronic kidney disease, unspecified CKD stage, unspecified acute renal failure type:   Diagnosis management comments:  The patient presents as a GI bleed on Coumadin.  I discussed reversal of the Coumadin, risks associated with it.  The patient hasn't agreement.  I discussed this with her 2 sons as well.  The patient was provided with 2 of FFP.  I did not provide any IV fluids as the patient does have a history of heart failure and I do not wish to fluid overload her.  I also started Protonix.  Patient was given vitamin K.  I discussed the case with hospitalist and GI doctor at [facility], I will obtain a second CBC if the patient remains here for extended period of time and initiate PRBC if hemoglobin drops as the patient does have cardiac history.  She did have chest pain with exertion today presumably worsened by the anemia.       Amount and/or Complexity of Data Reviewed  Clinical lab tests: ordered and reviewed  Obtain history from someone other than the patient: yes  Discuss the patient with other providers: yes    Risk of Complications, Morbidity, and/or Mortality  Presenting problems: moderate  Diagnostic procedures: moderate  Management options: moderate    Critical Care  Total time providing critical care: < 30 minutes (Excluding any separately billable procedures)    Patient Progress  Patient progress: stable      Final diagnoses:   Acute GI bleeding   Acute renal failure superimposed on chronic kidney disease, unspecified CKD stage, unspecified acute renal failure type   Acute blood loss anemia            Megan Alves DO  03/05/18 4755

## 2018-03-05 NOTE — NURSING NOTE
ACC REVIEW REPORT: [facility]        PATIENT NAME: Tre Kinney                                 EMERGENCY CONTACTS;  MONIQUE KINNEY, son, [phone]      SANTIGAO KINNEY, son,  [phone]    PATIENT ID: [id number]    BED: 3E / s343    BED TYPE: TELEMETRY    BED GIVEN TO: ADIN FARLEY RN    TIME BED GIVEN: 17:40    YOB: 1932    AGE:  85    GENDER:  FEMALE    PREVIOUS ADMIT TO [facility]:  NO    PREVIOUS ADMISSION DATE:  NONE    PATIENT CLASS:  OUTPATIENT    TODAY'S DATE: 3/5/2018    TRANSFER DATE:  03/05/2018    ETA:  21:00    TRANSFERRING FACILITY:  [facility]    TRANSFERRING FACILITY PHONE # :  [phone]    TRANSFERRING MD:  DR. KAT    DATE/TIME REQUEST RECEIVED:  03/05/2018 @ 17:40    [facility] RN:  CAL PAUL RN    REPORT FROM:  ADIN FARLEY RN    TIME REPORT TAKEN:  17:40    DIAGNOSIS:  GI BLEED, ANEMIA    REASON FOR TRANSFER TO [facility]:  NEEDS GI SPECIALIST    TRANSPORTATION:  [facility]    CLINICAL REASON FOR TRANSFER TO [facility]:  The pt was brought to the ED at 13:00 by her family after a 3 - 4 day history of rectal bleeding.  Mrs. Kinney complained of weakness on arrival. Her Hemacult was positive.  There are no available beds at [facility], the physician request [facility] Hospitalist accept the pt for transfer.  The pt will be admitted to a telemetry bed for Dr. Mustapha Steve.       CLINICAL INFORMATION    HEIGHT:  170.2 cm    WEIGHT:  84.823 kg    ALLERGIES:  LASIX    BLACKBURN:  NONE    INFECTIOUS DISEASE:  NO    ISOLATION:  NONE    1ST VITAL SIGNS:   TIME:  17:30  TEMP: 98.6   PULSE:  84 and PACED  B/P: 146/92  RESP:  20, O2 sat 99% on 2L/NC    LAB INFORMATION:  RBC 2.92,  HGB 7.9, HCT 25.9, PLTS 188, PT 33.0, INR 3.18, PTT 41.9, BUN 62, CREATININE 2.33, CO2 20.6,     CULTURE INFORMATION:  NONE DRAWN    MEDS/IV FLUIDS:  IV ACCESS x 2 SITES;  # 20 GA IN THE RIGHT A/C,  # 20 GA IN THE LEFT A/C. THE PT HAS BEEN GIVEN 5 mg VITAMIN K                                   PROTONIX gtt @ 8 mg / hr (thru the left  A/C)                                   2 UNITS FRESH FROZEN PLASMA (thru the right A/C)    CARDIAC SYSTEM:    CHEST PAIN:  SOME CHEST PAIN DAY BEFORE YESTERDAY.    RHYTHM:  PACED    Is patient taking or has patient been given any drugs that could increase bleeding?  YES  (Plavix, Brilinta, Effient, Eliquis, Xarelto, Warfarin, Integrilin, Angiomax)    DRUG:   WARFARIN    DOSE/FREQUENCY:  6 mg frequency not given    CARDIAC ENZYMES:    DATE:  03/05/2018   TIME:  13:00  CK:  INDEX 4.9  TROP:  0.043    CARDIAC NOTES:  THE PT IS IN A PACED RHYTHM.  SHE HAS HAD CARDIAC STENTS AND HAS A PACEMAKER.       RESPIRATORY SYSTEM:    LUNG SOUNDS:      WHEEZES:  INSPIRATORY   DIMINISHED:  YES    OXYGEN:  YES    O2 SAT:  %    ADMINISTRATION ROUTE:   2L/NC    RADIOLOGY RESULTS:   ON THE CXR THERE IS A TRACE OF LEFT LOBE EFFUSION. A COPY OF THE CHEST X-RAY WILL BE SENT WITH THE PT.    RESPIRATORY STATUS:  THE PATIENT IS ABLE TO CONVERSE WITHOUT SHORTNESS OF AIR.  SHE HAS HAD SOME INSPIRATORY WHEEZES BUT DOES NOT DROP HER O2 SAT.       CNS/MUSCULOSKELETAL    ALERT AND ORIENTED:    PERSON:  YES  PLACE:  YES  TIME:  YES    INJURY:  NONE      NIMESH COMA SCALE:    E:  4  M:  6  V:  5      EXTREMITY WEAKNESS:  NONE MORE GENERALIZED.       CNS/MUSCULOSKELETAL NOTES:   THE PT IS ALERT AND ORIENTED.  SHE IS ABLE TO ANSWER HEALTH QUESTIONS AND CAN AMBULATE BED TO CHAIR WITH ASSIST OF ONE.  HER FAMILY IS AT BEDSIDE.       GI//GY      ABDOMINAL PAIN:  NO    VOMITING:  NONE    DIARRHEA:  NONE    NAUSEA:  NONE    BOWEL SOUNDS:   ACTIVE    OCCULT STOOL:  POSITIVE    VAGINAL BLEEDING:  NONE    HEMATURIA:  NONE    GI//GY NOTES:   THE PT HAS BEEN NPO SINCE ARRIVAL    PAST MEDICAL HISTORY:  GERD, HTN, CHF, PALPITATIONS, CHEST PAIN, HIGH CHOLESTEROL, ANKLE JOINT PAIN, HYPERTROPHIC POLYARTHRITIS, ACUTE GI BLEED, ACUTE RENAL FAILURE, ANEMIA, CAD, and at some time possibly a Blood Clot.     OTHER SYMPTOM NOTES:  SURGERIES INCLUDE,  APPENDECTOMY,  , CARDIAC STENTS, AND PACEMAKER PLACEMENT.    ADDITIONAL NOTES:  THE PATIENT's PCP IS DR. TIN MCGOWAN.           Lois Baker, RN  3/5/2018  5:33 PM

## 2018-03-06 ENCOUNTER — EPISODE CHANGES (OUTPATIENT)
Dept: CASE MANAGEMENT | Facility: OTHER | Age: 83
End: 2018-03-06

## 2018-03-06 ENCOUNTER — ANESTHESIA EVENT (OUTPATIENT)
Dept: GASTROENTEROLOGY | Facility: HOSPITAL | Age: 83
End: 2018-03-06

## 2018-03-06 ENCOUNTER — ANESTHESIA (OUTPATIENT)
Dept: GASTROENTEROLOGY | Facility: HOSPITAL | Age: 83
End: 2018-03-06

## 2018-03-06 PROBLEM — K92.2 GASTROINTESTINAL HEMORRHAGE: Status: ACTIVE | Noted: 2018-03-05

## 2018-03-06 PROBLEM — E87.6 HYPOKALEMIA: Status: ACTIVE | Noted: 2018-03-06

## 2018-03-06 LAB
ABO + RH BLD: NORMAL
ABO + RH BLD: NORMAL
ABO GROUP BLD: NORMAL
ANION GAP SERPL CALCULATED.3IONS-SCNC: 7 MMOL/L (ref 3–11)
BASOPHILS # BLD AUTO: 0.03 10*3/MM3 (ref 0–0.2)
BASOPHILS NFR BLD AUTO: 0.6 % (ref 0–1)
BH BB BLOOD EXPIRATION DATE: NORMAL
BH BB BLOOD EXPIRATION DATE: NORMAL
BH BB BLOOD TYPE BARCODE: 7300
BH BB BLOOD TYPE BARCODE: 7300
BH BB DISPENSE STATUS: NORMAL
BH BB DISPENSE STATUS: NORMAL
BH BB PRODUCT CODE: NORMAL
BH BB PRODUCT CODE: NORMAL
BH BB UNIT NUMBER: NORMAL
BH BB UNIT NUMBER: NORMAL
BUN BLD-MCNC: 62 MG/DL (ref 9–23)
BUN/CREAT SERPL: 29.5 (ref 7–25)
CALCIUM SPEC-SCNC: 8.4 MG/DL (ref 8.7–10.4)
CHLORIDE SERPL-SCNC: 106 MMOL/L (ref 99–109)
CO2 SERPL-SCNC: 24 MMOL/L (ref 20–31)
CREAT BLD-MCNC: 2.1 MG/DL (ref 0.6–1.3)
DEPRECATED RDW RBC AUTO: 63.7 FL (ref 37–54)
EOSINOPHIL # BLD AUTO: 0.19 10*3/MM3 (ref 0–0.3)
EOSINOPHIL NFR BLD AUTO: 4.1 % (ref 0–3)
ERYTHROCYTE [DISTWIDTH] IN BLOOD BY AUTOMATED COUNT: 20.2 % (ref 11.3–14.5)
GFR SERPL CREATININE-BSD FRML MDRD: 22 ML/MIN/1.73
GLUCOSE BLD-MCNC: 87 MG/DL (ref 70–100)
HCT VFR BLD AUTO: 21.9 % (ref 34.5–44)
HCT VFR BLD AUTO: 28.5 % (ref 34.5–44)
HGB BLD-MCNC: 6.9 G/DL (ref 11.5–15.5)
HGB BLD-MCNC: 9.1 G/DL (ref 11.5–15.5)
IMM GRANULOCYTES # BLD: 0.01 10*3/MM3 (ref 0–0.03)
IMM GRANULOCYTES NFR BLD: 0.2 % (ref 0–0.6)
INR PPP: 1.68 (ref 0.91–1.09)
INR PPP: 2.17 (ref 0.91–1.09)
LYMPHOCYTES # BLD AUTO: 0.72 10*3/MM3 (ref 0.6–4.8)
LYMPHOCYTES NFR BLD AUTO: 15.4 % (ref 24–44)
MCH RBC QN AUTO: 27.1 PG (ref 27–31)
MCHC RBC AUTO-ENTMCNC: 31.5 G/DL (ref 32–36)
MCV RBC AUTO: 85.9 FL (ref 80–99)
MONOCYTES # BLD AUTO: 0.47 10*3/MM3 (ref 0–1)
MONOCYTES NFR BLD AUTO: 10 % (ref 0–12)
NEUTROPHILS # BLD AUTO: 3.26 10*3/MM3 (ref 1.5–8.3)
NEUTROPHILS NFR BLD AUTO: 69.7 % (ref 41–71)
PLATELET # BLD AUTO: 146 10*3/MM3 (ref 150–450)
PMV BLD AUTO: 8.8 FL (ref 6–12)
POTASSIUM BLD-SCNC: 3.3 MMOL/L (ref 3.5–5.5)
PROTHROMBIN TIME: 17.6 SECONDS (ref 9.6–11.5)
PROTHROMBIN TIME: 22.8 SECONDS (ref 9.6–11.5)
RBC # BLD AUTO: 2.55 10*6/MM3 (ref 3.89–5.14)
RH BLD: NEGATIVE
SODIUM BLD-SCNC: 137 MMOL/L (ref 132–146)
TROPONIN I SERPL-MCNC: 0.05 NG/ML
UNIT  ABO: NORMAL
UNIT  ABO: NORMAL
UNIT  RH: NORMAL
UNIT  RH: NORMAL
WBC NRBC COR # BLD: 4.68 10*3/MM3 (ref 3.5–10.8)

## 2018-03-06 PROCEDURE — 85014 HEMATOCRIT: CPT | Performed by: INTERNAL MEDICINE

## 2018-03-06 PROCEDURE — 86900 BLOOD TYPING SEROLOGIC ABO: CPT

## 2018-03-06 PROCEDURE — 0DB68ZX EXCISION OF STOMACH, VIA NATURAL OR ARTIFICIAL OPENING ENDOSCOPIC, DIAGNOSTIC: ICD-10-PCS | Performed by: INTERNAL MEDICINE

## 2018-03-06 PROCEDURE — 85610 PROTHROMBIN TIME: CPT | Performed by: INTERNAL MEDICINE

## 2018-03-06 PROCEDURE — 85610 PROTHROMBIN TIME: CPT | Performed by: PHYSICIAN ASSISTANT

## 2018-03-06 PROCEDURE — 80048 BASIC METABOLIC PNL TOTAL CA: CPT | Performed by: INTERNAL MEDICINE

## 2018-03-06 PROCEDURE — P9016 RBC LEUKOCYTES REDUCED: HCPCS

## 2018-03-06 PROCEDURE — 25010000002 PROPOFOL 1000 MG/ML EMULSION: Performed by: NURSE ANESTHETIST, CERTIFIED REGISTERED

## 2018-03-06 PROCEDURE — 86901 BLOOD TYPING SEROLOGIC RH(D): CPT

## 2018-03-06 PROCEDURE — 25010000002 PROPOFOL 10 MG/ML EMULSION: Performed by: NURSE ANESTHETIST, CERTIFIED REGISTERED

## 2018-03-06 PROCEDURE — 36430 TRANSFUSION BLD/BLD COMPNT: CPT

## 2018-03-06 PROCEDURE — 85018 HEMOGLOBIN: CPT | Performed by: INTERNAL MEDICINE

## 2018-03-06 PROCEDURE — 99222 1ST HOSP IP/OBS MODERATE 55: CPT | Performed by: PHYSICIAN ASSISTANT

## 2018-03-06 PROCEDURE — 85025 COMPLETE CBC W/AUTO DIFF WBC: CPT | Performed by: INTERNAL MEDICINE

## 2018-03-06 PROCEDURE — 84484 ASSAY OF TROPONIN QUANT: CPT | Performed by: INTERNAL MEDICINE

## 2018-03-06 PROCEDURE — 88305 TISSUE EXAM BY PATHOLOGIST: CPT | Performed by: INTERNAL MEDICINE

## 2018-03-06 PROCEDURE — 99233 SBSQ HOSP IP/OBS HIGH 50: CPT | Performed by: FAMILY MEDICINE

## 2018-03-06 RX ORDER — PEG-3350, SODIUM SULFATE, SODIUM CHLORIDE, POTASSIUM CHLORIDE, SODIUM ASCORBATE AND ASCORBIC ACID 7.5-2.691G
1000 KIT ORAL ONCE
Status: DISCONTINUED | OUTPATIENT
Start: 2018-03-07 | End: 2018-03-06 | Stop reason: SDUPTHER

## 2018-03-06 RX ORDER — POTASSIUM CHLORIDE 750 MG/1
40 CAPSULE, EXTENDED RELEASE ORAL AS NEEDED
Status: DISCONTINUED | OUTPATIENT
Start: 2018-03-06 | End: 2018-03-08 | Stop reason: HOSPADM

## 2018-03-06 RX ORDER — FUROSEMIDE 40 MG/1
40 TABLET ORAL
Status: DISCONTINUED | OUTPATIENT
Start: 2018-03-06 | End: 2018-03-08 | Stop reason: HOSPADM

## 2018-03-06 RX ORDER — SODIUM CHLORIDE, SODIUM LACTATE, POTASSIUM CHLORIDE, CALCIUM CHLORIDE 600; 310; 30; 20 MG/100ML; MG/100ML; MG/100ML; MG/100ML
9 INJECTION, SOLUTION INTRAVENOUS CONTINUOUS
Status: DISCONTINUED | OUTPATIENT
Start: 2018-03-06 | End: 2018-03-06 | Stop reason: SDUPTHER

## 2018-03-06 RX ORDER — POTASSIUM CHLORIDE 750 MG/1
TABLET, FILM COATED, EXTENDED RELEASE ORAL
Refills: 0 | COMMUNITY
Start: 2018-01-31 | End: 2018-03-08 | Stop reason: HOSPADM

## 2018-03-06 RX ORDER — PEG-3350, SODIUM SULFATE, SODIUM CHLORIDE, POTASSIUM CHLORIDE, SODIUM ASCORBATE AND ASCORBIC ACID 7.5-2.691G
1000 KIT ORAL ONCE
Status: COMPLETED | OUTPATIENT
Start: 2018-03-06 | End: 2018-03-06

## 2018-03-06 RX ORDER — CHOLECALCIFEROL (VITAMIN D3) 125 MCG
CAPSULE ORAL
Refills: 0 | COMMUNITY
Start: 2018-01-31 | End: 2022-09-19

## 2018-03-06 RX ORDER — PANTOPRAZOLE SODIUM 40 MG/1
40 TABLET, DELAYED RELEASE ORAL
Status: DISCONTINUED | OUTPATIENT
Start: 2018-03-07 | End: 2018-03-08 | Stop reason: HOSPADM

## 2018-03-06 RX ORDER — METOPROLOL TARTRATE 50 MG/1
50 TABLET, FILM COATED ORAL EVERY 12 HOURS SCHEDULED
Status: DISCONTINUED | OUTPATIENT
Start: 2018-03-06 | End: 2018-03-08 | Stop reason: HOSPADM

## 2018-03-06 RX ORDER — SODIUM CHLORIDE 9 MG/ML
INJECTION, SOLUTION INTRAVENOUS CONTINUOUS PRN
Status: DISCONTINUED | OUTPATIENT
Start: 2018-03-06 | End: 2018-03-06 | Stop reason: SURG

## 2018-03-06 RX ORDER — POTASSIUM CHLORIDE 1.5 G/1.77G
40 POWDER, FOR SOLUTION ORAL AS NEEDED
Status: DISCONTINUED | OUTPATIENT
Start: 2018-03-06 | End: 2018-03-08 | Stop reason: HOSPADM

## 2018-03-06 RX ORDER — POTASSIUM CHLORIDE 7.45 MG/ML
10 INJECTION INTRAVENOUS
Status: DISCONTINUED | OUTPATIENT
Start: 2018-03-06 | End: 2018-03-08 | Stop reason: HOSPADM

## 2018-03-06 RX ORDER — PEG-3350, SODIUM SULFATE, SODIUM CHLORIDE, POTASSIUM CHLORIDE, SODIUM ASCORBATE AND ASCORBIC ACID 7.5-2.691G
1000 KIT ORAL ONCE
Status: COMPLETED | OUTPATIENT
Start: 2018-03-07 | End: 2018-03-07

## 2018-03-06 RX ORDER — SODIUM CHLORIDE 0.9 % (FLUSH) 0.9 %
1-10 SYRINGE (ML) INJECTION AS NEEDED
Status: DISCONTINUED | OUTPATIENT
Start: 2018-03-06 | End: 2018-03-06 | Stop reason: HOSPADM

## 2018-03-06 RX ORDER — PROPOFOL 10 MG/ML
VIAL (ML) INTRAVENOUS AS NEEDED
Status: DISCONTINUED | OUTPATIENT
Start: 2018-03-06 | End: 2018-03-06 | Stop reason: SURG

## 2018-03-06 RX ORDER — SODIUM CHLORIDE 9 MG/ML
50 INJECTION, SOLUTION INTRAVENOUS CONTINUOUS
Status: DISCONTINUED | OUTPATIENT
Start: 2018-03-06 | End: 2018-03-07

## 2018-03-06 RX ORDER — FUROSEMIDE 20 MG/1
TABLET ORAL
Refills: 0 | COMMUNITY
Start: 2018-01-28 | End: 2018-03-08 | Stop reason: HOSPADM

## 2018-03-06 RX ORDER — SPIRONOLACTONE 25 MG/1
TABLET ORAL
Refills: 0 | COMMUNITY
Start: 2018-01-31 | End: 2022-09-19

## 2018-03-06 RX ORDER — LIDOCAINE HYDROCHLORIDE 10 MG/ML
0.5 INJECTION, SOLUTION EPIDURAL; INFILTRATION; INTRACAUDAL; PERINEURAL ONCE AS NEEDED
Status: DISCONTINUED | OUTPATIENT
Start: 2018-03-06 | End: 2018-03-06 | Stop reason: HOSPADM

## 2018-03-06 RX ORDER — FAMOTIDINE 20 MG/1
20 TABLET, FILM COATED ORAL ONCE
Status: DISCONTINUED | OUTPATIENT
Start: 2018-03-06 | End: 2018-03-06 | Stop reason: HOSPADM

## 2018-03-06 RX ORDER — PEG-3350, SODIUM SULFATE, SODIUM CHLORIDE, POTASSIUM CHLORIDE, SODIUM ASCORBATE AND ASCORBIC ACID 7.5-2.691G
1000 KIT ORAL ONCE
Status: DISCONTINUED | OUTPATIENT
Start: 2018-03-06 | End: 2018-03-06 | Stop reason: SDUPTHER

## 2018-03-06 RX ADMIN — SODIUM CHLORIDE 100 ML/HR: 9 INJECTION, SOLUTION INTRAVENOUS at 08:33

## 2018-03-06 RX ADMIN — NYSTATIN 500000 UNITS: 100000 SUSPENSION ORAL at 20:32

## 2018-03-06 RX ADMIN — ISOSORBIDE MONONITRATE 60 MG: 60 TABLET, EXTENDED RELEASE ORAL at 08:33

## 2018-03-06 RX ADMIN — HYDRALAZINE HYDROCHLORIDE 100 MG: 50 TABLET, FILM COATED ORAL at 05:22

## 2018-03-06 RX ADMIN — SODIUM CHLORIDE: 9 INJECTION, SOLUTION INTRAVENOUS at 14:50

## 2018-03-06 RX ADMIN — PANTOPRAZOLE SODIUM 40 MG: 40 INJECTION, POWDER, FOR SOLUTION INTRAVENOUS at 08:33

## 2018-03-06 RX ADMIN — HYDROCODONE BITARTRATE AND ACETAMINOPHEN 1 TABLET: 7.5; 325 TABLET ORAL at 20:32

## 2018-03-06 RX ADMIN — METOPROLOL TARTRATE 50 MG: 50 TABLET ORAL at 20:33

## 2018-03-06 RX ADMIN — SODIUM CHLORIDE 50 ML/HR: 9 INJECTION, SOLUTION INTRAVENOUS at 14:49

## 2018-03-06 RX ADMIN — GABAPENTIN 400 MG: 400 CAPSULE ORAL at 20:33

## 2018-03-06 RX ADMIN — POLYETHYLENE GLYCOL 3350, SODIUM SULFATE, SODIUM CHLORIDE, POTASSIUM CHLORIDE, ASCORBIC ACID, SODIUM ASCORBATE 1000 ML: KIT at 19:35

## 2018-03-06 RX ADMIN — HYDRALAZINE HYDROCHLORIDE 100 MG: 50 TABLET, FILM COATED ORAL at 20:33

## 2018-03-06 RX ADMIN — ALPRAZOLAM 0.5 MG: 0.5 TABLET ORAL at 20:32

## 2018-03-06 RX ADMIN — GABAPENTIN 400 MG: 400 CAPSULE ORAL at 00:44

## 2018-03-06 RX ADMIN — PROPOFOL 50 MG: 10 INJECTION, EMULSION INTRAVENOUS at 14:55

## 2018-03-06 RX ADMIN — METOPROLOL TARTRATE 50 MG: 50 TABLET ORAL at 00:44

## 2018-03-06 RX ADMIN — PROPOFOL 50 MCG/KG/MIN: 10 INJECTION, EMULSION INTRAVENOUS at 14:55

## 2018-03-06 RX ADMIN — FUROSEMIDE 60 MG: 20 TABLET ORAL at 08:33

## 2018-03-06 RX ADMIN — METOPROLOL TARTRATE 50 MG: 50 TABLET ORAL at 08:33

## 2018-03-06 RX ADMIN — CITALOPRAM HYDROBROMIDE 20 MG: 20 TABLET ORAL at 08:33

## 2018-03-06 RX ADMIN — HYDRALAZINE HYDROCHLORIDE 100 MG: 50 TABLET, FILM COATED ORAL at 17:26

## 2018-03-06 RX ADMIN — FUROSEMIDE 40 MG: 40 TABLET ORAL at 17:27

## 2018-03-06 RX ADMIN — HYDROCODONE BITARTRATE AND ACETAMINOPHEN 1 TABLET: 7.5; 325 TABLET ORAL at 00:44

## 2018-03-06 NOTE — PROGRESS NOTES
Discharge Planning Assessment  [facility]     Patient Name: Tre Kinney  MRN: [id number]  Today's Date: 3/6/2018    Admit Date: 3/5/2018          Discharge Needs Assessment       03/06/18 1032    Living Environment    Lives With child(abner), adult    Living Arrangements house    Home Accessibility no concerns    Type of Financial/Environmental Concern none    Transportation Available car;family or friend will provide    Living Environment    Provides Primary Care For no one, unable/limited ability to care for self    Primary Care Provided By child(abner) (specify)   sonValdez, lives with patient and assists with meds, cooking, housekeeping, laundry    Quality Of Family Relationships supportive;involved;helpful    Able to Return to Prior Living Arrangements yes    Discharge Needs Assessment    Concerns To Be Addressed discharge planning concerns    Readmission Within The Last 30 Days no previous admission in last 30 days   transfer from [facility]    Outpatient/Agency/Support Group Needs homecare agency (specify level of care)   Home health for SN, PT, OT, aide    Community Agency Name(S) [facility], . [phone], fax [phone]    Equipment Currently Used at Home hospital bed;oxygen;commode;wheelchair;ramp;walker, rolling;rollator    Discharge Facility/Level Of Care Needs home with home health    Current Discharge Risk chronically ill    Discharge Disposition still a patient    Discharge Contact Information if Applicable Valdez giang, [phone]; Leno giang, [phone]            Discharge Plan       03/06/18 1035    Case Management/Social Work Plan    Plan Home with home health    Patient/Family In Agreement With Plan yes    Additional Comments Met with patient in the room to initiate discharge planning. Patient lives with her son, Valdez, in a [place] [facility]. Her son assists her with medications, cooking, cleaning, etc. Patient has been independent with mobility but has a RW  available as needed. She has mutliple DME at home, including a hospital bed. Patient wears 2L home oxygen continuously that is provided by [employer]. She is current with [facility] for SN, PT, OT, and an aide. Per Ileana with [facility], patient will need a resume order and DC summary called to [phone] and faxed to [phone] at discharge. Patient's goal at this time is to go home with home health. If she were to need rehab she would like a referral to [facility] in [place]. Her family will provide her ride. CM will continue to follow.         Discharge Placement     No information found        Expected Discharge Date and Time     Expected Discharge Date Expected Discharge Time    Mar 8, 2018               Demographic Summary       03/06/18 1024    Referral Information    Admission Type inpatient    Referral Source admission list    Reason For Consult discharge planning    Record Reviewed clinical discipline documentation;history and physical;medical record    Contact Information    Permission Granted to Share Information With ;family/designee   Valdez medina or Leno Kinney    Primary Care Physician Information    Name Myles Barone            Functional Status       03/06/18 1031    Functional Status Prior    Ambulation 1-->assistive equipment   uses a RW as needed    Transferring 1-->assistive equipment    Toileting 0-->independent    Bathing 0-->independent    Dressing 0-->independent    Eating 0-->independent    Communication 0-->understands/communicates without difficulty    Swallowing 0-->swallows foods/liquids without difficulty    IADL    Medications assistive person    Meal Preparation assistive person    Housekeeping assistive person    Laundry assistive person    Shopping assistive person    Oral Care independent    Employment/Financial    Employment/Finance Comments Confirmed patient has medical coverage through [employer]; has rx coverage through Medicare D; no  issuess affording meds            Psychosocial     None            Abuse/Neglect     None            Legal     None            Substance Abuse     None            Patient Forms     None          Tosin Solano

## 2018-03-06 NOTE — ANESTHESIA PREPROCEDURE EVALUATION
Anesthesia Evaluation     Patient summary reviewed and Nursing notes reviewed   no history of anesthetic complications:  NPO Solid Status: > 8 hours  NPO Liquid Status: > 8 hours           Airway   Mallampati: III  TM distance: >3 FB  Neck ROM: full  Possible difficult intubation  Dental    (+) poor dentition    Comment: Multiple missing and broken teeth    Pulmonary - normal exam    breath sounds clear to auscultation  (+) shortness of breath,   Cardiovascular - normal exam  Exercise tolerance: poor (<4 METS)    ECG reviewed  PT is on anticoagulation therapy  Rhythm: regular  Rate: normal    (+) pacemaker (home monitoring) pacemaker interrogated <1 month ago, hypertension, CAD, cardiac stents dysrhythmias Atrial Fib, CHF, hyperlipidemia,       Neuro/Psych  GI/Hepatic/Renal/Endo    (+)  GERD, GI bleeding, renal disease (CKD Creatinine 2.1),   (-) hepatitis, liver disease, diabetes, hypothyroidism    Musculoskeletal     (+) arthralgias,   Abdominal   (+) obese,    Substance History      OB/GYN          Other   (+) arthritis       Other Comment: anemia                Anesthesia Plan    ASA 4     general     intravenous induction   Anesthetic plan and risks discussed with patient.    Plan discussed with CRNA.

## 2018-03-06 NOTE — PLAN OF CARE
Problem: GI Endoscopy (Adult)  Goal: Signs and Symptoms of Listed Potential Problems Will be Absent or Manageable (GI Endoscopy)  Outcome: Outcome(s) achieved Date Met: 03/06/18

## 2018-03-06 NOTE — CONSULTS
"[facility]    Referring Provider: Siri Prince MD    PCP: Myles Barone MD    Reason for Consultation: Melena    Chief complaint: Mixed dark and bright red blood stools     History of present illness:    Tre Kinney is a 85 y.o. female who is admitted with a GI bleed.  She reports a four day history of \"coffee ground stools\" that were initially dark and now mixed with bright red blood.  She has mild abdominal discomfort described as intermittent aching in the epigastric region and left lower quadrant.  She denies nausea, vomiting or hematemesis.  She reports a history of GI bleeds in the past; previously had PUD and colon polyps.  She reports several colonoscopies in the past but is unsure of when her last was performed.  She denies NSAID use.  She is on chronic anticoagulation with Coumadin secondary to Atrial Fibrillation.  INR last night was 3.18; she received 2 units of FFP and 5mg of Vitamin K+.      Allergies:  Latex    Scheduled Meds:    citalopram 20 mg Oral Daily   furosemide 60 mg Oral BID   gabapentin 400 mg Oral Nightly   hydrALAZINE 100 mg Oral Q8H   isosorbide mononitrate 60 mg Oral Daily   metoprolol tartrate 50 mg Oral Q12H   pantoprazole 40 mg Intravenous Q AM        Infusions:    sodium chloride 100 mL/hr Last Rate: 100 mL/hr (03/06/18 0833)       PRN Meds:  •  ALPRAZolam  •  HYDROcodone-acetaminophen  •  potassium chloride **OR** potassium chloride **OR** potassium chloride  •  sodium chloride    Home Meds:  Prescriptions Prior to Admission   Medication Sig Dispense Refill Last Dose   • ALPRAZolam (XANAX) 0.5 MG tablet Take 0.5 mg by mouth At Night As Needed for Anxiety. Ordered to take bid prn, but patient only takes nightly prn.   Past Week at Unknown time   • Cholecalciferol (VITAMIN D3) 5000 units tablet tablet Take 5,000 Units by mouth Daily.   3/4/2018 at Unknown time   • citalopram (CELEXA) 20 MG tablet Take 20 mg by mouth Daily.   3/5/2018 at Unknown time "   • furosemide (LASIX) 40 MG tablet Take 60 mg by mouth 2 (Two) Times a Day.   3/5/2018 at Unknown time   • gabapentin (NEURONTIN) 400 MG capsule Take 400 mg by mouth Every Night.   3/4/2018 at Unknown time   • hydrALAZINE (APRESOLINE) 100 MG tablet Take 1 tablet by mouth Every 8 (Eight) Hours. 90 tablet 0 3/5/2018 at Unknown time   • HYDROcodone-acetaminophen (NORCO) 7.5-325 MG per tablet Take 1 tablet by mouth 2 (Two) Times a Day As Needed for Moderate Pain .   3/5/2018 at Unknown time   • isosorbide mononitrate (IMDUR) 60 MG 24 hr tablet Take 60 mg by mouth Daily.   3/5/2018 at Unknown time   • metoprolol tartrate (LOPRESSOR) 50 MG tablet Take 50 mg by mouth 2 (Two) Times a Day.   3/5/2018 at Unknown time   • pantoprazole (PROTONIX) 40 MG EC tablet Take 40 mg by mouth Daily.   3/5/2018 at Unknown time   • potassium chloride (MICRO-K) 10 MEQ CR capsule Take 10 mEq by mouth 2 (Two) Times a Day.   3/5/2018 at Unknown time   • warfarin (COUMADIN) 4 MG tablet Take 6 mg by mouth Daily.   3/5/2018 at Unknown time   • furosemide (LASIX) 20 MG tablet   0    • potassium chloride (K-DUR) 10 MEQ CR tablet   0    • RA VITAMIN D-3 5000 units capsule   0    • spironolactone (ALDACTONE) 25 MG tablet   0        ROS: Review of Systems   Constitutional: Positive for fatigue. Negative for chills and fever.   HENT: Negative for trouble swallowing and voice change.    Eyes: Negative for visual disturbance.   Respiratory: Positive for shortness of breath.    Cardiovascular: Positive for leg swelling.   Gastrointestinal: Positive for abdominal pain, blood in stool and diarrhea. Negative for nausea and vomiting.   Endocrine: Negative.    Genitourinary: Negative.    Musculoskeletal: Negative.    Skin: Positive for pallor.   Allergic/Immunologic: Negative.    Neurological: Positive for weakness.   Hematological: Bruises/bleeds easily.   Psychiatric/Behavioral: Negative.        PAST MED HX:  Past Medical History:   Diagnosis Date   •  "Arthritis    • CHF (congestive heart failure)    • Coronary artery disease    • H/O blood clots    • History of transfusion     no reaction    • Hypertension        PAST SURG HX:  Past Surgical History:   Procedure Laterality Date   • ABDOMINAL SURGERY     • APPENDECTOMY     • CARDIAC SURGERY     •  SECTION     • CORONARY ANGIOPLASTY WITH STENT PLACEMENT     • PACEMAKER IMPLANTATION      left side    • SKIN BIOPSY     • TUBAL ABDOMINAL LIGATION         FAM HX:  Family History   Problem Relation Age of Onset   • Other Mother      cardiac disorder   • Hypertension Mother    • Heart disease Mother    • Thyroid disease Mother    • Other Father      cardiac disorder   • Heart disease Father    • Hypertension Father    • Other Sister      cardiac disorder   • Other Brother      cardiac disorder   • Other Other      cardiac disorder   • Other Other      cardiac disorder       SOC HX:  Social History     Social History   • Marital status:      Spouse name: N/A   • Number of children: N/A   • Years of education: N/A     Occupational History   • Not on file.     Social History Main Topics   • Smoking status: Never Smoker   • Smokeless tobacco: Not on file   • Alcohol use No   • Drug use: No   • Sexual activity: Defer     Other Topics Concern   • Not on file     Social History Narrative       PHYSICAL EXAM  /70 (BP Location: Left arm, Patient Position: Lying)  Pulse 84  Temp 98.1 °F (36.7 °C) (Oral)   Resp 20  Ht 170.2 cm (67.01\")  Wt 85.5 kg (188 lb 6.4 oz)  SpO2 99%  BMI 29.5 kg/m2  Wt Readings from Last 3 Encounters:   18 85.5 kg (188 lb 6.4 oz)   18 84.8 kg (187 lb)   18 81.3 kg (179 lb 3.2 oz)   ,body mass index is 29.5 kg/(m^2).  Physical Exam   Constitutional: She is oriented to person, place, and time.   HENT:   Head: Normocephalic and atraumatic.   Eyes: No scleral icterus.   Neck: Normal range of motion.   Cardiovascular: Normal rate and regular rhythm.  "   Pulmonary/Chest: Effort normal. No respiratory distress.   Abdominal: Soft. Bowel sounds are normal. She exhibits no distension.   Mild tenderness to palpation of the left lower quadrant and epigastric region.  No guarding.  No rebound tenderness.     Musculoskeletal: She exhibits edema.   Neurological: She is alert and oriented to person, place, and time.   Skin: Skin is warm and dry.   Psychiatric: She has a normal mood and affect. Her behavior is normal.   Nursing note and vitals reviewed.         Results Review:   I reviewed the patient's new clinical results.    Lab Results   Component Value Date    WBC 4.68 03/06/2018    HGB 9.1 (L) 03/06/2018    HGB 6.9 (L) 03/06/2018    HGB 7.9 (L) 03/05/2018    HCT 28.5 (L) 03/06/2018    MCV 85.9 03/06/2018     (L) 03/06/2018       Lab Results   Component Value Date    INR 2.17 (H) 03/06/2018    INR 2.38 (H) 03/05/2018    INR 3.18 (H) 03/05/2018       Lab Results   Component Value Date    GLUCOSE 87 03/06/2018    BUN 62 (H) 03/06/2018    CREATININE 2.10 (H) 03/06/2018    EGFRIFNONA 22 (L) 03/06/2018    BCR 29.5 (H) 03/06/2018    CO2 24.0 03/06/2018    CALCIUM 8.4 (L) 03/06/2018    PROTENTOTREF 5.7 (L) 06/11/2017    ALBUMIN 3.90 03/05/2018    AST 25 03/05/2018    ALT 24 03/05/2018 12/29/17:  Iron 20.  Ferritin 40.  Iron saturation 5%.  TIBC 392     ASSESSMENTS/PLANS    1. Acute blood loss anemia  2. Melena  3. Anticoagulation use, history of Afib and CHF   4. CKD  5. CHF  6. Iron deficiency    >>> Patient with acute GI bleed.  History of peptic ulcer disease in the past.  Recommend EGD today if INR is <1.8.  Repeat INR is pending at this time.     >>> BID PPI  >>> If EGD unremarkable, patient will need colonoscopy tomorrow.      I discussed the patients findings and my recommendations with patient    MIKAL Mujica  03/06/18  8:35 AM

## 2018-03-06 NOTE — PLAN OF CARE
Problem: Patient Care Overview (Adult)  Goal: Plan of Care Review  Outcome: Ongoing (interventions implemented as appropriate)   03/06/18 0503   Coping/Psychosocial Response Interventions   Plan Of Care Reviewed With patient   Patient Care Overview   Progress no change   Outcome Evaluation   Outcome Summary/Follow up Plan Pt VSS, recieved one unit of blood for hgb of 6.9.                                 Problem: Fall Risk (Adult)  Goal: Identify Related Risk Factors and Signs and Symptoms  Outcome: Ongoing (interventions implemented as appropriate)    Goal: Absence of Falls  Outcome: Ongoing (interventions implemented as appropriate)

## 2018-03-06 NOTE — H&P
"    [facility] Medicine Services  HISTORY AND PHYSICAL    Patient Name: Tre Kinney  : 1932  MRN: [id number]  Primary Care Physician: Myles Barone MD    Subjective   Subjective     Chief Complaint: rectal bleeding, chest pain    HPI:    Tre Kinney is a 85 y.o. female transferred to [facility] from [place] for rectal bleeding per rectum - bright red blood and coffee ground - for the past four days. \"I thought it was going to get better but it didn't.\" Pt states this has never happened before. She has needed previous transfusions \"but they never found out where I was bleeding from.\" Most recent coloscopy was a few years ago; unsure of EGD date.    Pt also c/o Chest pain this morning that radiated to neck and teeth. \"It was the worst I ever had.\" Relieved with one ntg.     PMHx: Afib; chronic anticoagulation (coumadin, \"becuase my blood stays too thick\"); O2 dependence (pt denies COPD hx; lifelong nonsmoker); HFpEF; PPM (unsure of ICD - pt has never been shocked); HTN; CAD with stent x1; previous transfusion    PTA: transfusion 2 units fresh frozen plasma, vit K 5mg, protonix 80mg    86 Y/O FEMALE WHO IS ON CHRONIC ANTICOAGULATION FOR ?AFIB/\"BLOOD CLOTS\" PRESENTS W/ BRIGHT RED AS WELL AS DARK BLOOD PER RECTUM; LAST EPISODE OCCURRED THIS A.M PRIOR TO TRANSFER FROM [facility].  PT HAS NOT HAD COLONOSCOPY; STATES SHE HAS HAD TRANSFUSIONS IN PAST FOR BLOOD LOSS BUT \"NEVER FOUND OUT WHERE IT WAS GOING\".  NO N/V.  HAS MILD DIFFUSE ABD PAIN.  PT HAS RECEIVED FFP AND VIT K PRIOR TO TRANSFER.  HGB  WAS 8.9 AND NOW 7.9.  PT ALSO NOTES CP TO BACK AND JAW THIS A.M.  SOME SHORTNESS OF BREATH AS WELL.  PT HAS HAD MULTIPLE RECENT ADMISSIONS TO HOSPITAL IN [facility] FOR WHAT APPEARS TO BE AECHF.  PT ON O2 AT BASELINE FOR ?REASON.    Review of Systems   Constitutional: Positive for activity change, appetite change and fatigue.   HENT: Negative for trouble swallowing.    Respiratory: Positive for cough, chest " "tightness and shortness of breath.    Cardiovascular: Positive for chest pain (\"not since this morning\") and leg swelling.   Gastrointestinal: Positive for abdominal pain and diarrhea. Negative for constipation, nausea and vomiting.   Endocrine: Negative for polyuria.   Genitourinary: Negative for difficulty urinating.   Musculoskeletal: Positive for back pain and gait problem.   Neurological: Positive for weakness and headaches.   Psychiatric/Behavioral: Negative for confusion.           Otherwise 10-system ROS reviewed and is negative except as mentioned in the HPI.    Personal History     Past Medical History:   Diagnosis Date   • Arthritis    • CHF (congestive heart failure)    • Coronary artery disease    • H/O blood clots    • History of transfusion     no reaction    • Hypertension        Past Surgical History:   Procedure Laterality Date   • ABDOMINAL SURGERY     • APPENDECTOMY     • CARDIAC SURGERY     •  SECTION     • CORONARY ANGIOPLASTY WITH STENT PLACEMENT     • PACEMAKER IMPLANTATION      left side    • SKIN BIOPSY     • TUBAL ABDOMINAL LIGATION         Family History: family history includes Heart disease in her father and mother; Hypertension in her father and mother; Other in her brother, father, mother, other, other, and sister; Thyroid disease in her mother.     Social History:  reports that she has never smoked. She does not have any smokeless tobacco history on file. She reports that she does not drink alcohol or use illicit drugs.  Social History     Social History Narrative       Medications:  Prescriptions Prior to Admission   Medication Sig Dispense Refill Last Dose   • ALPRAZolam (XANAX) 0.5 MG tablet Take 0.5 mg by mouth At Night As Needed for Anxiety. Ordered to take bid prn, but patient only takes nightly prn.   Past Week at Unknown time   • Cholecalciferol (VITAMIN D3) 5000 units tablet tablet Take 5,000 Units by mouth Daily.   3/4/2018 at Unknown time   • citalopram (CELEXA) 20 " MG tablet Take 20 mg by mouth Daily.   3/5/2018 at Unknown time   • furosemide (LASIX) 40 MG tablet Take 60 mg by mouth 2 (Two) Times a Day.   3/5/2018 at Unknown time   • gabapentin (NEURONTIN) 400 MG capsule Take 400 mg by mouth Every Night.   3/4/2018 at Unknown time   • hydrALAZINE (APRESOLINE) 100 MG tablet Take 1 tablet by mouth Every 8 (Eight) Hours. 90 tablet 0 3/5/2018 at Unknown time   • HYDROcodone-acetaminophen (NORCO) 7.5-325 MG per tablet Take 1 tablet by mouth 2 (Two) Times a Day As Needed for Moderate Pain .   3/5/2018 at Unknown time   • isosorbide mononitrate (IMDUR) 60 MG 24 hr tablet Take 60 mg by mouth Daily.   3/5/2018 at Unknown time   • metoprolol tartrate (LOPRESSOR) 50 MG tablet Take 50 mg by mouth 2 (Two) Times a Day.   3/5/2018 at Unknown time   • pantoprazole (PROTONIX) 40 MG EC tablet Take 40 mg by mouth Daily.   3/5/2018 at Unknown time   • potassium chloride (MICRO-K) 10 MEQ CR capsule Take 10 mEq by mouth 2 (Two) Times a Day.   3/5/2018 at Unknown time   • warfarin (COUMADIN) 4 MG tablet Take 6 mg by mouth Daily.   3/5/2018 at Unknown time       Allergies   Allergen Reactions   • Latex        Objective   Objective     Vital Signs:   Temp:  [97.8 °F (36.6 °C)-98.1 °F (36.7 °C)] 98.1 °F (36.7 °C)  Heart Rate:  [76-82] 80  Resp:  [18-20] 20  BP: (131-146)/(68-92) 146/92        Physical Exam   Constitutional: She is oriented to person, place, and time. She appears well-developed and well-nourished. No distress.   HENT:   Head: Normocephalic and atraumatic.   Eyes: EOM are normal.   Neck: No tracheal deviation present.   Cardiovascular: Normal rate.    Murmur (Left upper outer border; III/VI) heard.  Pulmonary/Chest: Effort normal.   Abdominal: Soft.   Musculoskeletal: She exhibits tenderness (BLE).   Neurological: She is alert and oriented to person, place, and time.   Skin: Skin is dry. She is not diaphoretic. There is pallor.   BLE dry, rough, erythmatous  + hemosiderin staining     Psychiatric: She has a normal mood and affect. Her behavior is normal. Thought content normal.       NO CHANGES TO THE ABOVE EXAM    Results Reviewed:  I have personally reviewed current lab, radiology, and data and agree.      Results from last 7 days  Lab Units 03/05/18  1751 03/05/18  1406   WBC 10*3/mm3 5.77 6.74   HEMOGLOBIN g/dL 7.9* 7.9*   HEMATOCRIT % 24.9* 25.9*   PLATELETS 10*3/mm3 199 188   INR   --  3.18*       Results from last 7 days  Lab Units 03/05/18  1752 03/05/18  1406   SODIUM mmol/L  --  135   POTASSIUM mmol/L  --  3.8   CHLORIDE mmol/L  --  102   CO2 mmol/L  --  20.6*   BUN mg/dL  --  62*   CREATININE mg/dL  --  2.33*   GLUCOSE mg/dL  --  94   CALCIUM mg/dL  --  8.5   ALT (SGPT) U/L  --  24   AST (SGOT) U/L  --  25   TROPONIN I ng/mL 0.044* 0.043*     Estimated Creatinine Clearance: 19.8 mL/min (by C-G formula based on Cr of 2.33).  Brief Urine Lab Results  (Last result in the past 365 days)      Color   Clarity   Blood   Leuk Est   Nitrite   Protein   CREAT   Urine HCG        12/27/17 1403 Yellow Clear Negative Negative Negative Negative         12/27/17 1403             50.6           No results found for: BNP  No results found for: PHART  Imaging Results (last 24 hours)     ** No results found for the last 24 hours. **        Results for orders placed during the hospital encounter of 12/26/17   Adult Transthoracic Echo Limited W/ Cont if Necessary Per Protocol    Narrative · The study is technically adequate for diagnosis.  · Left ventricular systolic function is low normal. Estimated EF = 50%.  · Right ventricular cavity is mildly dilated.  · Left atrial cavity size is moderate-to-severely dilated.  · Right atrial cavity size is moderately dilated. The inferior vena cava   is dilated. An electronic lead is present in the right atrium.  · There is no evidence of pericardial effusion. There is a small size left   pleural effusion.     Compared to the study of 7/28/2017, the EF appears  "improved.          Assessment/Plan   Assessment / Plan     Hospital Problem List     * (Principal)GI bleed    Hematochezia    Acute renal failure (ARF)    Acute blood loss anemia            Assessment & Plan:    Acute GI Bleed; PT ON COUMADIN; WILL HOLD.  HAS RECEIVED VIT K AND FFP.  HGB W/O CHANGE OVER LAST 2 LAB DRAWS AND NO MORE REPORTED BLEEDING SINCE THIS A.M.  GIVEN HER CP AND ELEVATED TROPONIN, WILL LIKELY MONITOR FOR NOW RATHER THAN SCOPE AS LONG AS H/H STABLE.  GI WAS CONSULTED PRIOR TO TRANSFER.  Complete infusion of second unit of fresh frozen plasma  GI consult  NPO after midnight  Labs: Pt/INR, PTT, serial H/H  Protonix    Acute Blood Loss Anemia; S/P 2 U FFP.  SERIAL H/H.  WILL TRANSFUSE 1 UNIT PRBC GIVEN PT W/ C/O CP AND ELEVATED TROPONIN.  Type and cross  Continue to monitor    Chest Pain W/ ELEVATED TROPONIN; SUSPECT THIS IS RELATED TO DEMAND ISCHEMIA FROM HER ANEMIA RATHER THAN TRUE STENOTIC LESION. HAD ECHO AND STRESS IN December.  WILL TREND TROPONIN +/- FURTHER EVAL.  WILL NOT GIVE ASA/PLAVIX/HEP GIVEN THE ABOVE.  Trend trop  EKG    Acute Renal Failure   Hx CKD IV (creat baseline ~ 1.7)  Renal dose medications  IV fluid  Avoid nephrotoxic medications  Continue to monitor    Insomnia  Continue home medications as appropriate    Restless Leg Syndrome  Neuropathic Pain  Pt states she recently began taking Neurontin, which has helped her with pain/restlessness/sleep    DVT prophylaxis: kevin/scds    CODE STATUS:  Full Code \"I don't want to die but I don't want to be kept alive on machines.\" Pt would like son, Leno Kinney, to make decisions if pt is no longer able.     Admission Status:  I believe this patient meets INPATIENT status due to the need for care which can only be reasonably provided in an hospital setting such as aggressive/expedited ancillary services and/or consultation services, the necessity for IV medications, close physician monitoring and/or the possible need for procedures.  In " such, I feel patient’s risk for adverse outcomes and need for care warrant INPATIENT evaluation and predict the patient’s care encounter to likely last beyond 2 midnights.    Electronically signed by LANCE Tomlin, 03/05/18, 8:57 PM.  Electronically signed by Amira Sharp MD, 03/05/18, 11:47 PM.

## 2018-03-06 NOTE — PROGRESS NOTES
Adult Nutrition  Assessment/PES    Patient Name:  Tre Kinney  YOB: 1932  MRN: [id number]  Admit Date:  3/5/2018    Assessment Date:  3/6/2018    Comments:            Reason for Assessment       03/06/18 1406    Reason for Assessment    Reason For Assessment/Visit identified at risk by screening criteria    Identified At Risk By Screening Criteria MST SCORE 2+    Time Spent (min) 20    Gastrointestinal Gastrointestinal bleed    Hematological Anemia    Other diagnosis PMHx: CHF, CAD,, blood clots, HTN, CKD, PUD              Nutrition/Diet History       03/06/18 1407    Nutrition/Diet History    Reported/Observed By Patient    Appetite Fair    Reported GI Symptoms Abdominal pain            Anthropometrics       03/06/18 1408    Usual Body Weight (UBW)    Usual Body Weight 88 kg (194 lb)    Weight Loss 2.722 kg (6 lb)    Weight Loss Time Frame 2 months      03/06/18 1407    Anthropometrics    RD Documented Weight on Admission 85.4 kg (188 lb 6 oz)   Bed weight on admission.            Labs/Tests/Procedures/Meds       03/06/18 1409    Labs/Tests/Procedures/Meds    Labs/Tests Review Reviewed                Nutrition Prescription Ordered       03/06/18 1409    Nutrition Prescription PO    Current PO Diet NPO   Possible EGD today              Problem/Interventions:        Problem 1       03/06/18 1409    Nutrition Diagnoses Problem 1    Problem 1 Inadequate Intake/Infusion    Inadequate Intake Type Oral    Etiology (related to) Other (comment)   clinical condition/Abd pain    Signs/Symptoms (evidenced by) Report of Mnimal PO Intake                    Intervention Goal       03/06/18 1410    Intervention Goal    General Nutrition support treatment    PO Initiate feeding;Establish PO;Tolerate PO            Nutrition Intervention       03/06/18 1410    Nutrition Intervention    RD/Tech Action Await begin PO;Follow Tx progress;Care plan reviewd              Education/Evaluation       03/06/18 1410     Monitor/Evaluation    Monitor Per protocol        Electronically signed by:  Shraddha Wisdom  03/06/18 2:11 PM

## 2018-03-06 NOTE — PROGRESS NOTES
[facility] Medicine Services  PROGRESS NOTE    Patient Name: Tre Kinney  : 1932  MRN: [id number]    Date of Admission: 3/5/2018  Length of Stay: 1  Primary Care Physician: Myles Barone MD    Subjective   Subjective     CC:  GI bleed, ABL anemia    HPI:  Patient seen in endoscopy.  S/p EGD, results discussed with Dr. Shabazz, planning for colonoscopy in am. No further melena/BRBPR since admit, s/p 2u pRBC with good Hgb response.    Review of Systems  Otherwise ROS is negative except as mentioned in the HPI.    Objective   Objective     Vital Signs:   Temp:  [97.2 °F (36.2 °C)-98.4 °F (36.9 °C)] 97.5 °F (36.4 °C)  Heart Rate:  [75-89] 79  Resp:  [16-20] 16  BP: (113-147)/(60-92) 120/70        Physical Exam:  Constitutional: No acute distress, asleep, normal body habitus  Respiratory: Clear to auscultation bilaterally, good effort, nonlabored respirations   Cardiovascular: RRR, no murmur  Gastrointestinal: Positive bowel sounds, soft, nondistended  Musculoskeletal: No peripheral edema, normal muscle tone for age  Skin: No rashes, no jaundice, no petechiae, no mottling    Results Reviewed:  I have personally reviewed current lab, radiology, and data and agree.      Results from last 7 days  Lab Units 18  0908 18  0801 18  1751   WBC 10*3/mm3  --   --  4.68  --  5.23 5.77   HEMOGLOBIN g/dL  --  9.1* 6.9*  --  7.9* 7.9*   HEMATOCRIT %  --  28.5* 21.9*  --  24.7* 24.9*   PLATELETS 10*3/mm3  --   --  146*  --  156 199   INR  1.68*  --  2.17* 2.38*  --   --        Results from last 7 days  Lab Units 18  1752 18  1406   SODIUM mmol/L 137  --   --  135   POTASSIUM mmol/L 3.3*  --   --  3.8   CHLORIDE mmol/L 106  --   --  102   CO2 mmol/L 24.0  --   --  20.6*   BUN mg/dL 62*  --   --  62*   CREATININE mg/dL 2.10*  --   --  2.33*   GLUCOSE mg/dL 87  --   --  94   CALCIUM mg/dL 8.4*   --   --  8.5   ALT (SGPT) U/L  --   --   --  24   AST (SGOT) U/L  --   --   --  25   TROPONIN I ng/mL 0.049* 0.043* 0.044* 0.043*     Estimated Creatinine Clearance: 22 mL/min (by C-G formula based on Cr of 2.1).  No results found for: BNP  No results found for: PHART    Microbiology Results Abnormal     None          Imaging Results (last 24 hours)     ** No results found for the last 24 hours. **        Results for orders placed during the hospital encounter of 12/26/17   Adult Transthoracic Echo Limited W/ Cont if Necessary Per Protocol    Narrative · The study is technically adequate for diagnosis.  · Left ventricular systolic function is low normal. Estimated EF = 50%.  · Right ventricular cavity is mildly dilated.  · Left atrial cavity size is moderate-to-severely dilated.  · Right atrial cavity size is moderately dilated. The inferior vena cava   is dilated. An electronic lead is present in the right atrium.  · There is no evidence of pericardial effusion. There is a small size left   pleural effusion.     Compared to the study of 7/28/2017, the EF appears improved.          I have reviewed the medications.    Assessment/Plan   Assessment / Plan     Hospital Problem List     * (Principal)Gastrointestinal hemorrhage    Overview Signed 3/6/2018  8:59 AM by MIKAL Mujica     Added automatically from request for surgery [id number]         Hematochezia    DIANA (acute kidney injury)    Acute blood loss anemia    Chronic anticoagulation    Hypokalemia             Brief Hospital Course to date:  Tre Kinney is a 85 y.o. female transferred to [facility] from [place] for rectal bleeding per rectum - bright red blood and coffee ground - for four days and associated acute blood loss anemia.      Assessment & Plan:  - s/p EGD today with no active bleeding, evidence of gastritis.  Plan is for colonoscopoy tomorrow per Dr. Gresham.  - initially on IV PPI BID, will transition to po qday PPI in light of gastritis dx  - s/p 2u pRBC,  no further bleeding noted here, recheck H&H in am.     DVT Prophylaxis:  Mechanical only    CODE STATUS: Full Code    Disposition: I expect the patient to be discharged ~2 days.      Electronically signed by Lindsay Mackenzie MD, 03/06/18, 4:54 PM.

## 2018-03-06 NOTE — ANESTHESIA POSTPROCEDURE EVALUATION
Patient: Tre Kinney    Procedure Summary     Date Anesthesia Start Anesthesia Stop Room / Location    03/06/18 1450   [facility] /  [facility]       Procedure Diagnosis Surgeon Provider    ESOPHAGOGASTRODUODENOSCOPY (N/A Esophagus) Hematochezia; Acute blood loss anemia; Gastrointestinal hemorrhage, unspecified gastrointestinal hemorrhage type  (Hematochezia [K92.1]; Acute blood loss anemia [D62]; Gastrointestinal hemorrhage, unspecified gastrointestinal hemorrhage type [K92.2]) MD Tiffany Quiles MD          Anesthesia Type: general  Last vitals  BP   120/60   Temp   97.4   Pulse   79   Resp   18     SpO2   98%     Post Anesthesia Care and Evaluation    Patient location during evaluation: PACU  Patient participation: complete - patient participated  Level of consciousness: awake  Pain score: 0  Pain management: adequate  Airway patency: patent  Anesthetic complications: No anesthetic complications  PONV Status: none  Cardiovascular status: acceptable and stable  Respiratory status: nasal cannula, unassisted and acceptable  Hydration status: acceptable

## 2018-03-06 NOTE — ED NOTES
Called [facility] to let them know PHI is getting ready to lift off with patient.     Simin Reyes  03/05/18 1933

## 2018-03-07 ENCOUNTER — ANESTHESIA (OUTPATIENT)
Dept: GASTROENTEROLOGY | Facility: HOSPITAL | Age: 83
End: 2018-03-07

## 2018-03-07 ENCOUNTER — ANESTHESIA EVENT (OUTPATIENT)
Dept: GASTROENTEROLOGY | Facility: HOSPITAL | Age: 83
End: 2018-03-07

## 2018-03-07 LAB
ABO + RH BLD: NORMAL
ANION GAP SERPL CALCULATED.3IONS-SCNC: 9 MMOL/L (ref 3–11)
BH BB BLOOD EXPIRATION DATE: NORMAL
BH BB BLOOD TYPE BARCODE: 1700
BH BB DISPENSE STATUS: NORMAL
BH BB PRODUCT CODE: NORMAL
BH BB UNIT NUMBER: NORMAL
BUN BLD-MCNC: 50 MG/DL (ref 9–23)
BUN/CREAT SERPL: 23.8 (ref 7–25)
CALCIUM SPEC-SCNC: 9.2 MG/DL (ref 8.7–10.4)
CHLORIDE SERPL-SCNC: 109 MMOL/L (ref 99–109)
CO2 SERPL-SCNC: 24 MMOL/L (ref 20–31)
CREAT BLD-MCNC: 2.1 MG/DL (ref 0.6–1.3)
CYTO UR: NORMAL
GFR SERPL CREATININE-BSD FRML MDRD: 22 ML/MIN/1.73
GLUCOSE BLD-MCNC: 99 MG/DL (ref 70–100)
HCT VFR BLD AUTO: 30.2 % (ref 34.5–44)
HGB BLD-MCNC: 9.6 G/DL (ref 11.5–15.5)
LAB AP CASE REPORT: NORMAL
LAB AP CLINICAL INFORMATION: NORMAL
Lab: NORMAL
PATH REPORT.FINAL DX SPEC: NORMAL
PATH REPORT.GROSS SPEC: NORMAL
POTASSIUM BLD-SCNC: 3.7 MMOL/L (ref 3.5–5.5)
SODIUM BLD-SCNC: 142 MMOL/L (ref 132–146)
UNIT  ABO: NORMAL
UNIT  RH: NORMAL

## 2018-03-07 PROCEDURE — 25010000002 PHENYLEPHRINE PER 1 ML: Performed by: NURSE ANESTHETIST, CERTIFIED REGISTERED

## 2018-03-07 PROCEDURE — 25010000002 PROPOFOL 10 MG/ML EMULSION: Performed by: NURSE ANESTHETIST, CERTIFIED REGISTERED

## 2018-03-07 PROCEDURE — 85018 HEMOGLOBIN: CPT | Performed by: FAMILY MEDICINE

## 2018-03-07 PROCEDURE — 25010000002 PROPOFOL 1000 MG/ML EMULSION: Performed by: NURSE ANESTHETIST, CERTIFIED REGISTERED

## 2018-03-07 PROCEDURE — 99232 SBSQ HOSP IP/OBS MODERATE 35: CPT | Performed by: INTERNAL MEDICINE

## 2018-03-07 PROCEDURE — 85014 HEMATOCRIT: CPT | Performed by: FAMILY MEDICINE

## 2018-03-07 PROCEDURE — 80048 BASIC METABOLIC PNL TOTAL CA: CPT | Performed by: FAMILY MEDICINE

## 2018-03-07 PROCEDURE — 0DJD8ZZ INSPECTION OF LOWER INTESTINAL TRACT, VIA NATURAL OR ARTIFICIAL OPENING ENDOSCOPIC: ICD-10-PCS | Performed by: INTERNAL MEDICINE

## 2018-03-07 RX ORDER — LIDOCAINE HYDROCHLORIDE 10 MG/ML
INJECTION, SOLUTION EPIDURAL; INFILTRATION; INTRACAUDAL; PERINEURAL AS NEEDED
Status: DISCONTINUED | OUTPATIENT
Start: 2018-03-07 | End: 2018-03-07 | Stop reason: SURG

## 2018-03-07 RX ORDER — ONDANSETRON 2 MG/ML
4 INJECTION INTRAMUSCULAR; INTRAVENOUS ONCE AS NEEDED
Status: DISCONTINUED | OUTPATIENT
Start: 2018-03-07 | End: 2018-03-07 | Stop reason: HOSPADM

## 2018-03-07 RX ORDER — SODIUM CHLORIDE 0.9 % (FLUSH) 0.9 %
1-10 SYRINGE (ML) INJECTION AS NEEDED
Status: DISCONTINUED | OUTPATIENT
Start: 2018-03-07 | End: 2018-03-07 | Stop reason: HOSPADM

## 2018-03-07 RX ORDER — LIDOCAINE HYDROCHLORIDE 10 MG/ML
0.5 INJECTION, SOLUTION EPIDURAL; INFILTRATION; INTRACAUDAL; PERINEURAL ONCE AS NEEDED
Status: DISCONTINUED | OUTPATIENT
Start: 2018-03-07 | End: 2018-03-07 | Stop reason: HOSPADM

## 2018-03-07 RX ORDER — ACETAMINOPHEN 325 MG/1
650 TABLET ORAL ONCE AS NEEDED
Status: DISCONTINUED | OUTPATIENT
Start: 2018-03-07 | End: 2018-03-07 | Stop reason: HOSPADM

## 2018-03-07 RX ORDER — PROMETHAZINE HYDROCHLORIDE 25 MG/1
25 SUPPOSITORY RECTAL ONCE AS NEEDED
Status: DISCONTINUED | OUTPATIENT
Start: 2018-03-07 | End: 2018-03-07 | Stop reason: HOSPADM

## 2018-03-07 RX ORDER — PROMETHAZINE HYDROCHLORIDE 25 MG/ML
6.25 INJECTION, SOLUTION INTRAMUSCULAR; INTRAVENOUS ONCE AS NEEDED
Status: DISCONTINUED | OUTPATIENT
Start: 2018-03-07 | End: 2018-03-07 | Stop reason: HOSPADM

## 2018-03-07 RX ORDER — SODIUM CHLORIDE, SODIUM LACTATE, POTASSIUM CHLORIDE, CALCIUM CHLORIDE 600; 310; 30; 20 MG/100ML; MG/100ML; MG/100ML; MG/100ML
9 INJECTION, SOLUTION INTRAVENOUS CONTINUOUS
Status: DISCONTINUED | OUTPATIENT
Start: 2018-03-07 | End: 2018-03-07

## 2018-03-07 RX ORDER — FAMOTIDINE 20 MG/1
20 TABLET, FILM COATED ORAL ONCE
Status: DISCONTINUED | OUTPATIENT
Start: 2018-03-07 | End: 2018-03-07 | Stop reason: HOSPADM

## 2018-03-07 RX ORDER — IPRATROPIUM BROMIDE AND ALBUTEROL SULFATE 2.5; .5 MG/3ML; MG/3ML
3 SOLUTION RESPIRATORY (INHALATION) ONCE AS NEEDED
Status: DISCONTINUED | OUTPATIENT
Start: 2018-03-07 | End: 2018-03-07 | Stop reason: HOSPADM

## 2018-03-07 RX ORDER — PROMETHAZINE HYDROCHLORIDE 25 MG/1
25 TABLET ORAL ONCE AS NEEDED
Status: DISCONTINUED | OUTPATIENT
Start: 2018-03-07 | End: 2018-03-07 | Stop reason: HOSPADM

## 2018-03-07 RX ORDER — PROPOFOL 10 MG/ML
VIAL (ML) INTRAVENOUS AS NEEDED
Status: DISCONTINUED | OUTPATIENT
Start: 2018-03-07 | End: 2018-03-07 | Stop reason: SURG

## 2018-03-07 RX ADMIN — HYDRALAZINE HYDROCHLORIDE 100 MG: 50 TABLET, FILM COATED ORAL at 20:47

## 2018-03-07 RX ADMIN — SODIUM CHLORIDE 50 ML/HR: 9 INJECTION, SOLUTION INTRAVENOUS at 09:05

## 2018-03-07 RX ADMIN — FUROSEMIDE 40 MG: 40 TABLET ORAL at 17:58

## 2018-03-07 RX ADMIN — NYSTATIN 500000 UNITS: 100000 SUSPENSION ORAL at 11:54

## 2018-03-07 RX ADMIN — FUROSEMIDE 40 MG: 40 TABLET ORAL at 08:13

## 2018-03-07 RX ADMIN — HYDROCODONE BITARTRATE AND ACETAMINOPHEN 1 TABLET: 7.5; 325 TABLET ORAL at 20:50

## 2018-03-07 RX ADMIN — NYSTATIN 500000 UNITS: 100000 SUSPENSION ORAL at 08:13

## 2018-03-07 RX ADMIN — PROPOFOL 50 MCG/KG/MIN: 10 INJECTION, EMULSION INTRAVENOUS at 09:31

## 2018-03-07 RX ADMIN — GABAPENTIN 400 MG: 400 CAPSULE ORAL at 20:47

## 2018-03-07 RX ADMIN — PHENYLEPHRINE HYDROCHLORIDE 80 MCG: 10 INJECTION INTRAVENOUS at 09:51

## 2018-03-07 RX ADMIN — PANTOPRAZOLE SODIUM 40 MG: 40 TABLET, DELAYED RELEASE ORAL at 05:59

## 2018-03-07 RX ADMIN — METOPROLOL TARTRATE 50 MG: 50 TABLET ORAL at 08:13

## 2018-03-07 RX ADMIN — ISOSORBIDE MONONITRATE 60 MG: 60 TABLET, EXTENDED RELEASE ORAL at 08:13

## 2018-03-07 RX ADMIN — PROPOFOL 40 MG: 10 INJECTION, EMULSION INTRAVENOUS at 09:29

## 2018-03-07 RX ADMIN — NYSTATIN 500000 UNITS: 100000 SUSPENSION ORAL at 20:47

## 2018-03-07 RX ADMIN — LIDOCAINE HYDROCHLORIDE 50 MG: 10 INJECTION, SOLUTION EPIDURAL; INFILTRATION; INTRACAUDAL; PERINEURAL at 09:29

## 2018-03-07 RX ADMIN — HYDRALAZINE HYDROCHLORIDE 100 MG: 50 TABLET, FILM COATED ORAL at 14:58

## 2018-03-07 RX ADMIN — HYDRALAZINE HYDROCHLORIDE 100 MG: 50 TABLET, FILM COATED ORAL at 05:59

## 2018-03-07 RX ADMIN — CITALOPRAM HYDROBROMIDE 20 MG: 20 TABLET ORAL at 08:13

## 2018-03-07 RX ADMIN — POLYETHYLENE GLYCOL 3350, SODIUM SULFATE, SODIUM CHLORIDE, POTASSIUM CHLORIDE, ASCORBIC ACID, SODIUM ASCORBATE 1000 ML: KIT at 04:40

## 2018-03-07 RX ADMIN — NYSTATIN 500000 UNITS: 100000 SUSPENSION ORAL at 17:59

## 2018-03-07 RX ADMIN — METOPROLOL TARTRATE 50 MG: 50 TABLET ORAL at 20:47

## 2018-03-07 NOTE — PROGRESS NOTES
[facility] Medicine Services  PROGRESS NOTE    Patient Name: Tre Kinney  : 1932  MRN: [id number]    Date of Admission: 3/5/2018  Length of Stay: 2  Primary Care Physician: Myles Barone MD    Subjective   Subjective     CC:  GI bleed, ABL anemia    HPI:  Had episode of BRBPR last night.  Nothing today.   A bit of abd discomfort post scope.   Sat up in chair yest  No new complaints    Review of Systems  Otherwise ROS is negative except as mentioned in the HPI.  Uses oxygen at home 2L  Walks at home    Objective   Objective     Vital Signs:   Temp:  [97.1 °F (36.2 °C)-98.3 °F (36.8 °C)] 97.1 °F (36.2 °C)  Heart Rate:  [76-91] 86  Resp:  [16-20] 17  BP: (105-156)/(47-86) 147/86        Physical Exam:  Constitutional: No acute distress, cheerful lady who mentates normally  Respiratory: Clear to auscultation bilaterally, good effort, nonlabored respirations on NC o2  Cardiovascular: RRR, 2/6 murmur  Gastrointestinal: Positive bowel sounds, soft, nondistended  Musculoskeletal: No peripheral edema, normal muscle tone for age  Skin: No rashes, no jaundice, no petechiae, no mottling    Results Reviewed:  I have personally reviewed current lab, radiology, and data and agree.      Results from last 7 days  Lab Units 18  0715 18  0908 18  0801 18  1751   WBC 10*3/mm3  --   --   --  4.68  --  5.23 5.77   HEMOGLOBIN g/dL 9.6*  --  9.1* 6.9*  --  7.9* 7.9*   HEMATOCRIT % 30.2*  --  28.5* 21.9*  --  24.7* 24.9*   PLATELETS 10*3/mm3  --   --   --  146*  --  156 199   INR   --  1.68*  --  2.17* 2.38*  --   --        Results from last 7 days  Lab Units 18  0715 1805/18  1752 18  1406   SODIUM mmol/L 142 137  --   --  135   POTASSIUM mmol/L 3.7 3.3*  --   --  3.8   CHLORIDE mmol/L 109 106  --   --  102   CO2 mmol/L 24.0 24.0  --   --  20.6*   BUN mg/dL 50* 62*  --   --  62*    CREATININE mg/dL 2.10* 2.10*  --   --  2.33*   GLUCOSE mg/dL 99 87  --   --  94   CALCIUM mg/dL 9.2 8.4*  --   --  8.5   ALT (SGPT) U/L  --   --   --   --  24   AST (SGOT) U/L  --   --   --   --  25   TROPONIN I ng/mL  --  0.049* 0.043* 0.044* 0.043*     Estimated Creatinine Clearance: 22 mL/min (by C-G formula based on Cr of 2.1).  No results found for: BNP  No results found for: PHART    Microbiology Results Abnormal     None          Imaging Results (last 24 hours)     ** No results found for the last 24 hours. **        Results for orders placed during the hospital encounter of 12/26/17   Adult Transthoracic Echo Limited W/ Cont if Necessary Per Protocol    Narrative · The study is technically adequate for diagnosis.  · Left ventricular systolic function is low normal. Estimated EF = 50%.  · Right ventricular cavity is mildly dilated.  · Left atrial cavity size is moderate-to-severely dilated.  · Right atrial cavity size is moderately dilated. The inferior vena cava   is dilated. An electronic lead is present in the right atrium.  · There is no evidence of pericardial effusion. There is a small size left   pleural effusion.     Compared to the study of 7/28/2017, the EF appears improved.          I have reviewed the medications.    Assessment/Plan   Assessment / Plan     Hospital Problem List     * (Principal)Gastrointestinal hemorrhage    Overview Signed 3/6/2018  8:59 AM by MIKAL Mujica     Added automatically from request for surgery [id number]         Hematochezia    DIANA (acute kidney injury)    Acute blood loss anemia    Chronic anticoagulation    Hypokalemia             Brief Hospital Course to date:  Tre Kinney is a 85 y.o. female transferred to [facility] from [place] for rectal bleeding per rectum - bright red blood and coffee ground - for four days and associated acute blood loss anemia.      Assessment & Plan:    GI bleed  -EGD today with no active bleeding, evidence of gastritis.  -- oral PPI  --  colonoscopy negative    Anemia  -- s/p 2u pRBC, no further bleeding noted here,    CKD 2.1    Heart murmur - she says this is not new  -- echo 2017 didn't show valve dysfxn  -- has ppm     DVT Prophylaxis:  Mechanical only    CODE STATUS: Full Code    Disposition: I expect the patient to be discharged ~1 days.      Electronically signed by Padmaja Sue MD, 03/07/18, 11:59 AM.

## 2018-03-07 NOTE — ANESTHESIA POSTPROCEDURE EVALUATION
Patient: Tre Kinney    Procedure Summary     Date Anesthesia Start Anesthesia Stop Room / Location    03/07/18 0923 1002  [facility] /  [facility]       Procedure Diagnosis Surgeon Provider    COLONOSCOPY (N/A ) Hematochezia  (Hematochezia [K92.1]) MD Chris Quiles MD          Anesthesia Type: general  Last vitals  BP   135/82 (03/07/18 0908)   Temp   97.7 °F (36.5 °C) (03/07/18 0754)   Pulse   85 (03/07/18 0908)   Resp   18 (03/07/18 0908)     SpO2   99 % (03/07/18 0908)     Post Anesthesia Care and Evaluation    Patient location during evaluation: PACU  Patient participation: complete - patient participated  Level of consciousness: awake and alert  Pain score: 0  Pain management: adequate  Airway patency: patent  Anesthetic complications: No anesthetic complications  PONV Status: none  Cardiovascular status: hemodynamically stable and acceptable  Respiratory status: nonlabored ventilation, acceptable and nasal cannula  Hydration status: acceptable

## 2018-03-07 NOTE — POST-PROCEDURE NOTE
Colon- 3 small polyps not removed due to admission for bleeding.  Diverticulosis.  No blood or bleeding site seen.  Presumed diverticular bleed.  Based on age FU colon not needed.

## 2018-03-07 NOTE — PLAN OF CARE
Problem: Patient Care Overview (Adult)  Goal: Plan of Care Review  Outcome: Ongoing (interventions implemented as appropriate)   03/06/18 0503 03/06/18 2000   Coping/Psychosocial Response Interventions   Plan Of Care Reviewed With --  patient   Patient Care Overview   Progress no change --    Outcome Evaluation   Outcome Summary/Follow up Plan Pt VSS, recieved one unit of blood for hgb of 6.9.  --      Goal: Adult Individualization and Mutuality  Outcome: Ongoing (interventions implemented as appropriate)    Goal: Discharge Needs Assessment  Outcome: Ongoing (interventions implemented as appropriate)   03/06/18 1032   Discharge Needs Assessment   Concerns To Be Addressed discharge planning concerns   Readmission Within The Last 30 Days no previous admission in last 30 days  (transfer from [facility]   Community Agency Name(S) [facility], . [phone], fax [phone]   Discharge Facility/Level Of Care Needs home with home health   Current Discharge Risk chronically ill   Discharge Disposition still a patient   Current Health   Outpatient/Agency/Support Group Needs homecare agency (specify level of care)  (Home health for SN, PT, OT, aide)   Living Environment   Transportation Available car;family or friend will provide   Self-Care   Equipment Currently Used at Home hospital bed;oxygen;commode;wheelchair;ramp;walker, rolling;rollator       Problem: Fall Risk (Adult)  Goal: Identify Related Risk Factors and Signs and Symptoms  Outcome: Ongoing (interventions implemented as appropriate)   03/06/18 0503   Fall Risk   Fall Risk: Related Risk Factors environment unfamiliar   Fall Risk: Signs and Symptoms presence of risk factors     Goal: Absence of Falls  Outcome: Ongoing (interventions implemented as appropriate)   03/06/18 0503   Fall Risk (Adult)   Absence of Falls making progress toward outcome

## 2018-03-07 NOTE — ANESTHESIA PREPROCEDURE EVALUATION
Anesthesia Evaluation                  Airway   Mallampati: II  Dental      Pulmonary    Cardiovascular     (+) hypertension, CAD,       Neuro/Psych  GI/Hepatic/Renal/Endo      Musculoskeletal     Abdominal    Substance History      OB/GYN          Other                        Anesthesia Plan    ASA 3     general     intravenous induction   Anesthetic plan and risks discussed with patient.

## 2018-03-08 VITALS
BODY MASS INDEX: 29.51 KG/M2 | HEIGHT: 67 IN | RESPIRATION RATE: 18 BRPM | OXYGEN SATURATION: 98 % | HEART RATE: 82 BPM | TEMPERATURE: 98.3 F | DIASTOLIC BLOOD PRESSURE: 82 MMHG | SYSTOLIC BLOOD PRESSURE: 141 MMHG | WEIGHT: 188 LBS

## 2018-03-08 PROBLEM — K92.1 HEMATOCHEZIA: Status: RESOLVED | Noted: 2018-03-05 | Resolved: 2018-03-08

## 2018-03-08 PROBLEM — K92.2 GASTROINTESTINAL HEMORRHAGE: Status: RESOLVED | Noted: 2018-03-05 | Resolved: 2018-03-08

## 2018-03-08 PROBLEM — N17.9 AKI (ACUTE KIDNEY INJURY) (HCC): Status: RESOLVED | Noted: 2018-03-05 | Resolved: 2018-03-08

## 2018-03-08 PROBLEM — D62 ACUTE BLOOD LOSS ANEMIA: Status: RESOLVED | Noted: 2018-03-05 | Resolved: 2018-03-08

## 2018-03-08 LAB
ANION GAP SERPL CALCULATED.3IONS-SCNC: 7 MMOL/L (ref 3–11)
BUN BLD-MCNC: 46 MG/DL (ref 9–23)
BUN/CREAT SERPL: 21.9 (ref 7–25)
CALCIUM SPEC-SCNC: 8.7 MG/DL (ref 8.7–10.4)
CHLORIDE SERPL-SCNC: 106 MMOL/L (ref 99–109)
CO2 SERPL-SCNC: 27 MMOL/L (ref 20–31)
CREAT BLD-MCNC: 2.1 MG/DL (ref 0.6–1.3)
DEPRECATED RDW RBC AUTO: 63.9 FL (ref 37–54)
ERYTHROCYTE [DISTWIDTH] IN BLOOD BY AUTOMATED COUNT: 20.2 % (ref 11.3–14.5)
GFR SERPL CREATININE-BSD FRML MDRD: 22 ML/MIN/1.73
GLUCOSE BLD-MCNC: 108 MG/DL (ref 70–100)
HCT VFR BLD AUTO: 27.3 % (ref 34.5–44)
HGB BLD-MCNC: 8.6 G/DL (ref 11.5–15.5)
MCH RBC QN AUTO: 27.8 PG (ref 27–31)
MCHC RBC AUTO-ENTMCNC: 31.5 G/DL (ref 32–36)
MCV RBC AUTO: 88.3 FL (ref 80–99)
PLATELET # BLD AUTO: 172 10*3/MM3 (ref 150–450)
PMV BLD AUTO: 9.4 FL (ref 6–12)
POTASSIUM BLD-SCNC: 3.6 MMOL/L (ref 3.5–5.5)
RBC # BLD AUTO: 3.09 10*6/MM3 (ref 3.89–5.14)
SODIUM BLD-SCNC: 140 MMOL/L (ref 132–146)
WBC NRBC COR # BLD: 4.79 10*3/MM3 (ref 3.5–10.8)

## 2018-03-08 PROCEDURE — 85027 COMPLETE CBC AUTOMATED: CPT | Performed by: INTERNAL MEDICINE

## 2018-03-08 PROCEDURE — 80048 BASIC METABOLIC PNL TOTAL CA: CPT | Performed by: INTERNAL MEDICINE

## 2018-03-08 PROCEDURE — 99239 HOSP IP/OBS DSCHRG MGMT >30: CPT | Performed by: NURSE PRACTITIONER

## 2018-03-08 RX ADMIN — HYDROCODONE BITARTRATE AND ACETAMINOPHEN 1 TABLET: 7.5; 325 TABLET ORAL at 08:21

## 2018-03-08 RX ADMIN — PANTOPRAZOLE SODIUM 40 MG: 40 TABLET, DELAYED RELEASE ORAL at 06:19

## 2018-03-08 RX ADMIN — CITALOPRAM HYDROBROMIDE 20 MG: 20 TABLET ORAL at 08:15

## 2018-03-08 RX ADMIN — METOPROLOL TARTRATE 50 MG: 50 TABLET ORAL at 08:14

## 2018-03-08 RX ADMIN — HYDRALAZINE HYDROCHLORIDE 100 MG: 50 TABLET, FILM COATED ORAL at 06:19

## 2018-03-08 RX ADMIN — HYDRALAZINE HYDROCHLORIDE 100 MG: 50 TABLET, FILM COATED ORAL at 15:00

## 2018-03-08 RX ADMIN — NYSTATIN 500000 UNITS: 100000 SUSPENSION ORAL at 08:14

## 2018-03-08 RX ADMIN — POTASSIUM CHLORIDE 40 MEQ: 750 CAPSULE, EXTENDED RELEASE ORAL at 08:15

## 2018-03-08 RX ADMIN — ISOSORBIDE MONONITRATE 60 MG: 60 TABLET, EXTENDED RELEASE ORAL at 08:15

## 2018-03-08 RX ADMIN — FUROSEMIDE 40 MG: 40 TABLET ORAL at 08:15

## 2018-03-08 RX ADMIN — NYSTATIN 500000 UNITS: 100000 SUSPENSION ORAL at 11:50

## 2018-03-08 NOTE — PROGRESS NOTES
Continued Stay Note   [facility]     Patient Name: Tre Kinney  MRN: [id number]  Today's Date: 3/8/2018    Admit Date: 3/5/2018          Discharge Plan       03/08/18 1542    Case Management/Social Work Plan    Plan Social work called and set up transporation for Mrs. Kinney to go home with Medicaid transporation today because her sons could not pick her up today.    Patient/Family In Agreement With Plan yes      03/08/18 1324    Case Management/Social Work Plan    Plan update d/c plan    Patient/Family In Agreement With Plan yes    Additional Comments Pt son is now unable to transport pt due to illness. Wheels van will be transporting pt back to [place] around 3pm. Pt is okay with this. CM contacted pt HH company ([employer] ) to let them know pt was leaving and also faxed them the d/c summary as requested. Faxed to [phone]. They will see pt tomorrow. No other d/c needs at this time. CM will follow through d/c.              Discharge Codes     None        Expected Discharge Date and Time     Expected Discharge Date Expected Discharge Time    Mar 8, 2018             LENORE Sinclair

## 2018-03-08 NOTE — DISCHARGE SUMMARY
[facility] Medicine Services  DISCHARGE SUMMARY    Patient Name: Tre Kinney  : 1932  MRN: [id number]    Date of Admission: 3/5/2018  Date of Discharge:  3/8/2018  Primary Care Physician: Myles Baorne MD    Consults     Date and Time Order Name Status Description    3/6/2018 0030 Inpatient Consult to Gastroenterology Completed         Hospital Course     Presenting Problem:   Anemia [D64.9]  Hematochezia [K92.1]    Active Hospital Problems (** Indicates Principal Problem)    Diagnosis Date Noted   • Hypokalemia [E87.6] 2018   • Chronic anticoagulation [Z79.01] 2018      Resolved Hospital Problems    Diagnosis Date Noted Date Resolved   • **Gastrointestinal hemorrhage [K92.2] 2018   • Hematochezia [K92.1] 2018   • DIANA (acute kidney injury) [N17.9] 2018   • Acute blood loss anemia [D62] 2018          Hospital Course:  Tre Kinney is a 85 y.o. female past medical history significant for atrial fib and prior blood clots on Coumadin therapy.  She transferred to [facility] from [facility] due to rectal bleeding.  This is been ongoing ×4 days with associated acute blood loss anemia.  She was admitted to hospital medicine service.  GI services were consulted and followed.  She underwent EGD which showed no active bleeding but evidence of gastritis.  Colonoscopy revealed a few small polyps that were not removed or biopsied.  Also found to have diverticulosis and likely diverticular bleed since resolved.  She is on oral PPI daily at home which was continued.  She did receive 2 units PRBCs due to her symptomatic anemia with no further bleeding noted and symptoms significantly improved.  Her Coumadin obviously was held throughout hospital stay and will continue to be held for 3 additional days on discharge per GI recommendations.  It can likely be resumed at that time per PCP recommendations  with them to manage Coumadin and INRs.  They she is currently hemodynamically stable and afebrile.  She denies nausea, vomiting, chest pain, shortness of air, melena, hematochezia, hematemesis.  She is tolerating diet well.  Feels ready for discharge home.  She's been cleared for discharge from a GI standpoint.  She'll follow-up with PCP in 2-3 days of discharge.  Can likely resume her Coumadin in 3 days if stable with PCP to resume and monitor based on labs and no bleeding at that time.  PCP to arrange GI follow-up in the future if indicated.  Return to ER for any other worsening of symptoms.    Procedure(s):  COLONOSCOPY   03/07 0913 COLONOSCOPY  03/06 1448 UPPER GI ENDOSCOPY   Day of Discharge     HPI:   Pt is seen at 0930am Chair in no acute distress.  No visitors at bedside.  States she's had no further GI bleeding upper or lower.  Tolerating diet.  Denies nausea, vomiting, chest pain, shortness of air.  Feels much improved and ready for discharge home.  Complaining only of mild lower extremity edema which is chronic in nature for her.    Review of Systems  Gen- No fevers, chills  CV- No chest pain, palpitations  Resp- No cough, dyspnea  GI- No N/V/D, abd pain      Otherwise ROS is negative except as mentioned in the HPI.    Vital Signs:   Temp:  [97.1 °F (36.2 °C)-98.3 °F (36.8 °C)] 98.3 °F (36.8 °C)  Heart Rate:  [77-86] 82  Resp:  [17-18] 18  BP: (116-147)/(59-86) 141/82     Physical Exam:  Constitutional: No acute distress, awake, alert.  Sitting upright in chair.  No family at bs.   Eyes: PERRLA, sclerae anicteric, no conjunctival injection  HENT: NCAT, mucous membranes moist  Neck: Supple, trachea midline  Respiratory: Clear to auscultation bilaterally A/P, nonlabored respirations   Cardiovascular: RRR, no murmurs, rubs, or gallops, palpable pedal pulses bilaterally  Gastrointestinal: Positive bowel sounds, soft, nontender, nondistended.  Obese abd   Musculoskeletal: Trace bilateral ankle and lower ext  edema, no clubbing or cyanosis to extremities  Psychiatric: Appropriate affect, cooperative  Neurologic: Oriented x 3, strength symmetric in all extremities, Cranial Nerves grossly intact to confrontation, speech clear.  Follows commands   Skin: No rashes      Pertinent  and/or Most Recent Results       Results from last 7 days  Lab Units 03/08/18  0701 03/07/18  0715 03/06/18  0801 03/06/18  0225 03/05/18  2046 03/05/18  1751 03/05/18  1406   WBC 10*3/mm3 4.79  --   --  4.68 5.23 5.77 6.74   HEMOGLOBIN g/dL 8.6* 9.6* 9.1* 6.9* 7.9* 7.9* 7.9*   HEMATOCRIT % 27.3* 30.2* 28.5* 21.9* 24.7* 24.9* 25.9*   PLATELETS 10*3/mm3 172  --   --  146* 156 199 188   SODIUM mmol/L 140 142  --  137  --   --  135   POTASSIUM mmol/L 3.6 3.7  --  3.3*  --   --  3.8   CHLORIDE mmol/L 106 109  --  106  --   --  102   CO2 mmol/L 27.0 24.0  --  24.0  --   --  20.6*   BUN mg/dL 46* 50*  --  62*  --   --  62*   CREATININE mg/dL 2.10* 2.10*  --  2.10*  --   --  2.33*   GLUCOSE mg/dL 108* 99  --  87  --   --  94   CALCIUM mg/dL 8.7 9.2  --  8.4*  --   --  8.5       Results from last 7 days  Lab Units 03/06/18  0908 03/06/18  0225 03/05/18  2117 03/05/18  1406   BILIRUBIN mg/dL  --   --   --  0.5   ALK PHOS U/L  --   --   --  49   ALT (SGPT) U/L  --   --   --  24   AST (SGOT) U/L  --   --   --  25   PROTIME Seconds 17.6* 22.8* 25.0* 33.0*   INR  1.68* 2.17* 2.38* 3.18*   APTT seconds  --   --  37.3* 41.9*           Invalid input(s): TG, LDLCALC, LDLREALC    Results from last 7 days  Lab Units 03/06/18  0225 03/05/18  2117 03/05/18  1752 03/05/18  1406   TROPONIN I ng/mL 0.049* 0.043* 0.044* 0.043*     Brief Urine Lab Results  (Last result in the past 365 days)      Color   Clarity   Blood   Leuk Est   Nitrite   Protein   CREAT   Urine HCG        12/27/17 1403 Yellow Clear Negative Negative Negative Negative         12/27/17 1403             50.6             Microbiology Results Abnormal     None          Imaging Results (all)     None                     Results for orders placed during the hospital encounter of 12/26/17   Adult Transthoracic Echo Limited W/ Cont if Necessary Per Protocol    Narrative · The study is technically adequate for diagnosis.  · Left ventricular systolic function is low normal. Estimated EF = 50%.  · Right ventricular cavity is mildly dilated.  · Left atrial cavity size is moderate-to-severely dilated.  · Right atrial cavity size is moderately dilated. The inferior vena cava   is dilated. An electronic lead is present in the right atrium.  · There is no evidence of pericardial effusion. There is a small size left   pleural effusion.     Compared to the study of 7/28/2017, the EF appears improved.            Discharge Details      Ter Kinney   Home Medication Instructions ALVARO:[id number]    Printed on:03/08/18 1021   Medication Information                      ALPRAZolam (XANAX) 0.5 MG tablet  Take 0.5 mg by mouth At Night As Needed for Anxiety. Ordered to take bid prn, but patient only takes nightly prn.             Cholecalciferol (VITAMIN D3) 5000 units tablet tablet  Take 5,000 Units by mouth Daily.             citalopram (CELEXA) 20 MG tablet  Take 20 mg by mouth Daily.             furosemide (LASIX) 40 MG tablet  Take 60 mg by mouth 2 (Two) Times a Day.             gabapentin (NEURONTIN) 400 MG capsule  Take 400 mg by mouth Every Night.             hydrALAZINE (APRESOLINE) 100 MG tablet  Take 1 tablet by mouth Every 8 (Eight) Hours.             HYDROcodone-acetaminophen (NORCO) 7.5-325 MG per tablet  Take 1 tablet by mouth 2 (Two) Times a Day As Needed for Moderate Pain .             isosorbide mononitrate (IMDUR) 60 MG 24 hr tablet  Take 60 mg by mouth Daily.             metoprolol tartrate (LOPRESSOR) 50 MG tablet  Take 50 mg by mouth 2 (Two) Times a Day.             pantoprazole (PROTONIX) 40 MG EC tablet  Take 40 mg by mouth Daily.             potassium chloride (MICRO-K) 10 MEQ CR capsule  Take 10 mEq by mouth 2  (Two) Times a Day.             RA VITAMIN D-3 5000 units capsule               spironolactone (ALDACTONE) 25 MG tablet                     Discharge Disposition:  Home or Self Care    Discharge Diet:  Diet Instructions     Diet: Regular, Cardiac, Soft Texture; Thin Liquids, No Restrictions; Whole; Thin       Discharge Diet:   Regular  Cardiac  Soft Texture      Fluid Consistency:  Thin Liquids, No Restrictions   Soft Options:  Whole   Fluid Consistency:  Thin   Low residue                 Discharge Activity:   Activity Instructions     Activity as Tolerated           Measure Blood Pressure                     Special Instructions:  Pt to continue to hold her coumadin for 3 more days.  Then resume per PCP to manage.  Follow up pcp 2-3 days.     No future appointments.    Additional Instructions for the Follow-ups that You Need to Schedule     Discharge Follow-up with PCP    As directed    Follow Up Details:  2-3 days                     Time Spent on Discharge:  40 minutes    Electronically signed by LANCE Aponte, 03/08/18, 10:21 AM.

## 2018-03-08 NOTE — DISCHARGE PLACEMENT REQUEST
"Tre Kinney (85 y.o. Female)     Date of Birth Social Security Number Address Home Phone MRN    1932  [address] [place] [address] [phone] [id number]    [place] Marital Status          None        Admission Date Admission Type Admitting Provider Attending Provider Department, Room/Bed    3/5/18 Elective Padmaja Sue MD Mini, Jocelyn, MD [facility] 3E, S343/1    Discharge Date Discharge Disposition Discharge Destination         Home or Self Care             Attending Provider: Padmaja Sue MD     Allergies:  Adhesive Tape    Isolation:  None   Infection:  None   Code Status:  FULL    Ht:  170.2 cm (67\")   Wt:  85.3 kg (188 lb)    Admission Cmt:  None   Principal Problem:  Gastrointestinal hemorrhage [K92.2] More...                 Active Insurance as of 3/5/2018     Primary Coverage     Payor Plan Insurance Group Employer/Plan Group    MEDICARE RAILROAD MEDICARE      Payor Plan Address Payor Plan Phone Number Effective From Effective To    [address] [phone] 1997     [place] [address]       Subscriber Name Subscriber Birth Date Member ID       TRE KINNEY 1932 [id number]                 Emergency Contacts      (Rel.) Home Phone Work Phone Mobile Phone    Valdez Kinney (Son) [phone] -- --    Phillip Kinney (Son) -- -- [phone]            Insurance Information                MEDICARE/RAILROAD MEDICARE Phone: [phone]    Subscriber: Tre Kinney Subscriber#: [id number]    Group#:  Precert#:              History & Physical      Amira Sharp MD at 3/5/2018  8:57 PM              [facility] Medicine Services  HISTORY AND PHYSICAL    Patient Name: Tre Kinney  : 1932  MRN: [id number]  Primary Care Physician: Myles Barone MD    Subjective   Subjective     Chief Complaint: rectal bleeding, chest pain    HPI:    Tre Kinney is a 85 y.o. female transferred to [facility] from [place] for " "rectal bleeding per rectum - bright red blood and coffee ground - for the past four days. \"I thought it was going to get better but it didn't.\" Pt states this has never happened before. She has needed previous transfusions \"but they never found out where I was bleeding from.\" Most recent coloscopy was a few years ago; unsure of EGD date.    Pt also c/o Chest pain this morning that radiated to neck and teeth. \"It was the worst I ever had.\" Relieved with one ntg.     PMHx: Afib; chronic anticoagulation (coumadin, \"becuase my blood stays too thick\"); O2 dependence (pt denies COPD hx; lifelong nonsmoker); HFpEF; PPM (unsure of ICD - pt has never been shocked); HTN; CAD with stent x1; previous transfusion    PTA: transfusion 2 units fresh frozen plasma, vit K 5mg, protonix 80mg    86 Y/O FEMALE WHO IS ON CHRONIC ANTICOAGULATION FOR ?AFIB/\"BLOOD CLOTS\" PRESENTS W/ BRIGHT RED AS WELL AS DARK BLOOD PER RECTUM; LAST EPISODE OCCURRED THIS A.M PRIOR TO TRANSFER FROM [facility].  PT HAS NOT HAD COLONOSCOPY; STATES SHE HAS HAD TRANSFUSIONS IN PAST FOR BLOOD LOSS BUT \"NEVER FOUND OUT WHERE IT WAS GOING\".  NO N/V.  HAS MILD DIFFUSE ABD PAIN.  PT HAS RECEIVED FFP AND VIT K PRIOR TO TRANSFER.  HGB 1/18 WAS 8.9 AND NOW 7.9.  PT ALSO NOTES CP TO BACK AND JAW THIS A.M.  SOME SHORTNESS OF BREATH AS WELL.  PT HAS HAD MULTIPLE RECENT ADMISSIONS TO HOSPITAL IN [facility] FOR WHAT APPEARS TO BE AECHF.  PT ON O2 AT BASELINE FOR ?REASON.    Review of Systems   Constitutional: Positive for activity change, appetite change and fatigue.   HENT: Negative for trouble swallowing.    Respiratory: Positive for cough, chest tightness and shortness of breath.    Cardiovascular: Positive for chest pain (\"not since this morning\") and leg swelling.   Gastrointestinal: Positive for abdominal pain and diarrhea. Negative for constipation, nausea and vomiting.   Endocrine: Negative for polyuria.   Genitourinary: Negative for difficulty urinating.   Musculoskeletal: " Positive for back pain and gait problem.   Neurological: Positive for weakness and headaches.   Psychiatric/Behavioral: Negative for confusion.           Otherwise 10-system ROS reviewed and is negative except as mentioned in the HPI.    Personal History     Past Medical History:   Diagnosis Date   • Arthritis    • CHF (congestive heart failure)    • Coronary artery disease    • H/O blood clots    • History of transfusion     no reaction    • Hypertension        Past Surgical History:   Procedure Laterality Date   • ABDOMINAL SURGERY     • APPENDECTOMY     • CARDIAC SURGERY     •  SECTION     • CORONARY ANGIOPLASTY WITH STENT PLACEMENT     • PACEMAKER IMPLANTATION      left side    • SKIN BIOPSY     • TUBAL ABDOMINAL LIGATION         Family History: family history includes Heart disease in her father and mother; Hypertension in her father and mother; Other in her brother, father, mother, other, other, and sister; Thyroid disease in her mother.     Social History:  reports that she has never smoked. She does not have any smokeless tobacco history on file. She reports that she does not drink alcohol or use illicit drugs.  Social History     Social History Narrative       Medications:  Prescriptions Prior to Admission   Medication Sig Dispense Refill Last Dose   • ALPRAZolam (XANAX) 0.5 MG tablet Take 0.5 mg by mouth At Night As Needed for Anxiety. Ordered to take bid prn, but patient only takes nightly prn.   Past Week at Unknown time   • Cholecalciferol (VITAMIN D3) 5000 units tablet tablet Take 5,000 Units by mouth Daily.   3/4/2018 at Unknown time   • citalopram (CELEXA) 20 MG tablet Take 20 mg by mouth Daily.   3/5/2018 at Unknown time   • furosemide (LASIX) 40 MG tablet Take 60 mg by mouth 2 (Two) Times a Day.   3/5/2018 at Unknown time   • gabapentin (NEURONTIN) 400 MG capsule Take 400 mg by mouth Every Night.   3/4/2018 at Unknown time   • hydrALAZINE (APRESOLINE) 100 MG tablet Take 1 tablet by mouth  Every 8 (Eight) Hours. 90 tablet 0 3/5/2018 at Unknown time   • HYDROcodone-acetaminophen (NORCO) 7.5-325 MG per tablet Take 1 tablet by mouth 2 (Two) Times a Day As Needed for Moderate Pain .   3/5/2018 at Unknown time   • isosorbide mononitrate (IMDUR) 60 MG 24 hr tablet Take 60 mg by mouth Daily.   3/5/2018 at Unknown time   • metoprolol tartrate (LOPRESSOR) 50 MG tablet Take 50 mg by mouth 2 (Two) Times a Day.   3/5/2018 at Unknown time   • pantoprazole (PROTONIX) 40 MG EC tablet Take 40 mg by mouth Daily.   3/5/2018 at Unknown time   • potassium chloride (MICRO-K) 10 MEQ CR capsule Take 10 mEq by mouth 2 (Two) Times a Day.   3/5/2018 at Unknown time   • warfarin (COUMADIN) 4 MG tablet Take 6 mg by mouth Daily.   3/5/2018 at Unknown time       Allergies   Allergen Reactions   • Latex        Objective   Objective     Vital Signs:   Temp:  [97.8 °F (36.6 °C)-98.1 °F (36.7 °C)] 98.1 °F (36.7 °C)  Heart Rate:  [76-82] 80  Resp:  [18-20] 20  BP: (131-146)/(68-92) 146/92        Physical Exam   Constitutional: She is oriented to person, place, and time. She appears well-developed and well-nourished. No distress.   HENT:   Head: Normocephalic and atraumatic.   Eyes: EOM are normal.   Neck: No tracheal deviation present.   Cardiovascular: Normal rate.    Murmur (Left upper outer border; III/VI) heard.  Pulmonary/Chest: Effort normal.   Abdominal: Soft.   Musculoskeletal: She exhibits tenderness (BLE).   Neurological: She is alert and oriented to person, place, and time.   Skin: Skin is dry. She is not diaphoretic. There is pallor.   BLE dry, rough, erythmatous  + hemosiderin staining    Psychiatric: She has a normal mood and affect. Her behavior is normal. Thought content normal.       NO CHANGES TO THE ABOVE EXAM    Results Reviewed:  I have personally reviewed current lab, radiology, and data and agree.      Results from last 7 days  Lab Units 03/05/18  1751 03/05/18  1406   WBC 10*3/mm3 5.77 6.74   HEMOGLOBIN g/dL  7.9* 7.9*   HEMATOCRIT % 24.9* 25.9*   PLATELETS 10*3/mm3 199 188   INR   --  3.18*       Results from last 7 days  Lab Units 03/05/18  1752 03/05/18  1406   SODIUM mmol/L  --  135   POTASSIUM mmol/L  --  3.8   CHLORIDE mmol/L  --  102   CO2 mmol/L  --  20.6*   BUN mg/dL  --  62*   CREATININE mg/dL  --  2.33*   GLUCOSE mg/dL  --  94   CALCIUM mg/dL  --  8.5   ALT (SGPT) U/L  --  24   AST (SGOT) U/L  --  25   TROPONIN I ng/mL 0.044* 0.043*     Estimated Creatinine Clearance: 19.8 mL/min (by C-G formula based on Cr of 2.33).  Brief Urine Lab Results  (Last result in the past 365 days)      Color   Clarity   Blood   Leuk Est   Nitrite   Protein   CREAT   Urine HCG        12/27/17 1403 Yellow Clear Negative Negative Negative Negative         12/27/17 1403             50.6           No results found for: BNP  No results found for: PHART  Imaging Results (last 24 hours)     ** No results found for the last 24 hours. **        Results for orders placed during the hospital encounter of 12/26/17   Adult Transthoracic Echo Limited W/ Cont if Necessary Per Protocol    Narrative · The study is technically adequate for diagnosis.  · Left ventricular systolic function is low normal. Estimated EF = 50%.  · Right ventricular cavity is mildly dilated.  · Left atrial cavity size is moderate-to-severely dilated.  · Right atrial cavity size is moderately dilated. The inferior vena cava   is dilated. An electronic lead is present in the right atrium.  · There is no evidence of pericardial effusion. There is a small size left   pleural effusion.     Compared to the study of 7/28/2017, the EF appears improved.          Assessment/Plan   Assessment / Plan     Hospital Problem List     * (Principal)GI bleed    Hematochezia    Acute renal failure (ARF)    Acute blood loss anemia            Assessment & Plan:    Acute GI Bleed; PT ON COUMADIN; WILL HOLD.  HAS RECEIVED VIT K AND FFP.  HGB W/O CHANGE OVER LAST 2 LAB DRAWS AND NO MORE REPORTED  "BLEEDING SINCE THIS A.M.  GIVEN HER CP AND ELEVATED TROPONIN, WILL LIKELY MONITOR FOR NOW RATHER THAN SCOPE AS LONG AS H/H STABLE.  GI WAS CONSULTED PRIOR TO TRANSFER.  Complete infusion of second unit of fresh frozen plasma  GI consult  NPO after midnight  Labs: Pt/INR, PTT, serial H/H  Protonix    Acute Blood Loss Anemia; S/P 2 U FFP.  SERIAL H/H.  WILL TRANSFUSE 1 UNIT PRBC GIVEN PT W/ C/O CP AND ELEVATED TROPONIN.  Type and cross  Continue to monitor    Chest Pain W/ ELEVATED TROPONIN; SUSPECT THIS IS RELATED TO DEMAND ISCHEMIA FROM HER ANEMIA RATHER THAN TRUE STENOTIC LESION. HAD ECHO AND STRESS IN December.  WILL TREND TROPONIN +/- FURTHER EVAL.  WILL NOT GIVE ASA/PLAVIX/HEP GIVEN THE ABOVE.  Trend trop  EKG    Acute Renal Failure   Hx CKD IV (creat baseline ~ 1.7)  Renal dose medications  IV fluid  Avoid nephrotoxic medications  Continue to monitor    Insomnia  Continue home medications as appropriate    Restless Leg Syndrome  Neuropathic Pain  Pt states she recently began taking Neurontin, which has helped her with pain/restlessness/sleep    DVT prophylaxis: kevin/scds    CODE STATUS:  Full Code \"I don't want to die but I don't want to be kept alive on machines.\" Pt would like son, Leno Kinney, to make decisions if pt is no longer able.     Admission Status:  I believe this patient meets INPATIENT status due to the need for care which can only be reasonably provided in an hospital setting such as aggressive/expedited ancillary services and/or consultation services, the necessity for IV medications, close physician monitoring and/or the possible need for procedures.  In such, I feel patient’s risk for adverse outcomes and need for care warrant INPATIENT evaluation and predict the patient’s care encounter to likely last beyond 2 midnights.    Electronically signed by LANCE Tomlin, 03/05/18, 8:57 PM.  Electronically signed by Amira Sharp MD, 03/05/18, 11:47 PM.       Electronically signed by Amira " DENNY Sharp MD at 3/5/2018 11:47 PM             Discharge Summary      LANCE Aponte at 3/8/2018  9:30 AM              [facility] Medicine Services  DISCHARGE SUMMARY    Patient Name: Tre Kinney  : 1932  MRN: [id number]    Date of Admission: 3/5/2018  Date of Discharge:  3/8/2018  Primary Care Physician: Myles Barone MD    Consults     Date and Time Order Name Status Description    3/6/2018 0030 Inpatient Consult to Gastroenterology Completed         Hospital Course     Presenting Problem:   Anemia [D64.9]  Hematochezia [K92.1]    Active Hospital Problems (** Indicates Principal Problem)    Diagnosis Date Noted   • Hypokalemia [E87.6] 2018   • Chronic anticoagulation [Z79.01] 2018      Resolved Hospital Problems    Diagnosis Date Noted Date Resolved   • **Gastrointestinal hemorrhage [K92.2] 2018   • Hematochezia [K92.1] 2018   • DIANA (acute kidney injury) [N17.9] 2018   • Acute blood loss anemia [D62] 2018          Hospital Course:  Tre Kinney is a 85 y.o. female past medical history significant for atrial fib and prior blood clots on Coumadin therapy.  She transferred to [facility] from [facility] due to rectal bleeding.  This is been ongoing ×4 days with associated acute blood loss anemia.  She was admitted to hospital medicine service.  GI services were consulted and followed.  She underwent EGD which showed no active bleeding but evidence of gastritis.  Colonoscopy revealed a few small polyps that were not removed or biopsied.  Also found to have diverticulosis and likely diverticular bleed since resolved.  She is on oral PPI daily at home which was continued.  She did receive 2 units PRBCs due to her symptomatic anemia with no further bleeding noted and symptoms significantly improved.  Her Coumadin obviously was held throughout hospital stay and will continue to be  held for 3 additional days on discharge per GI recommendations.  It can likely be resumed at that time per PCP recommendations with them to manage Coumadin and INRs.  They she is currently hemodynamically stable and afebrile.  She denies nausea, vomiting, chest pain, shortness of air, melena, hematochezia, hematemesis.  She is tolerating diet well.  Feels ready for discharge home.  She's been cleared for discharge from a GI standpoint.  She'll follow-up with PCP in 2-3 days of discharge.  Can likely resume her Coumadin in 3 days if stable with PCP to resume and monitor based on labs and no bleeding at that time.  PCP to arrange GI follow-up in the future if indicated.  Return to ER for any other worsening of symptoms.    Procedure(s):  COLONOSCOPY   03/07 0913 COLONOSCOPY  03/06 1448 UPPER GI ENDOSCOPY   Day of Discharge     HPI:   Pt is seen at 0930am Chair in no acute distress.  No visitors at bedside.  States she's had no further GI bleeding upper or lower.  Tolerating diet.  Denies nausea, vomiting, chest pain, shortness of air.  Feels much improved and ready for discharge home.  Complaining only of mild lower extremity edema which is chronic in nature for her.    Review of Systems  Gen- No fevers, chills  CV- No chest pain, palpitations  Resp- No cough, dyspnea  GI- No N/V/D, abd pain      Otherwise ROS is negative except as mentioned in the HPI.    Vital Signs:   Temp:  [97.1 °F (36.2 °C)-98.3 °F (36.8 °C)] 98.3 °F (36.8 °C)  Heart Rate:  [77-86] 82  Resp:  [17-18] 18  BP: (116-147)/(59-86) 141/82     Physical Exam:  Constitutional: No acute distress, awake, alert.  Sitting upright in chair.  No family at bs.   Eyes: PERRLA, sclerae anicteric, no conjunctival injection  HENT: NCAT, mucous membranes moist  Neck: Supple, trachea midline  Respiratory: Clear to auscultation bilaterally A/P, nonlabored respirations   Cardiovascular: RRR, no murmurs, rubs, or gallops, palpable pedal pulses  bilaterally  Gastrointestinal: Positive bowel sounds, soft, nontender, nondistended.  Obese abd   Musculoskeletal: Trace bilateral ankle and lower ext edema, no clubbing or cyanosis to extremities  Psychiatric: Appropriate affect, cooperative  Neurologic: Oriented x 3, strength symmetric in all extremities, Cranial Nerves grossly intact to confrontation, speech clear.  Follows commands   Skin: No rashes      Pertinent  and/or Most Recent Results       Results from last 7 days  Lab Units 03/08/18  0701 03/07/18  0715 03/06/18  0801 03/06/18 0225 03/05/18 2046 03/05/18  1751 03/05/18  1406   WBC 10*3/mm3 4.79  --   --  4.68 5.23 5.77 6.74   HEMOGLOBIN g/dL 8.6* 9.6* 9.1* 6.9* 7.9* 7.9* 7.9*   HEMATOCRIT % 27.3* 30.2* 28.5* 21.9* 24.7* 24.9* 25.9*   PLATELETS 10*3/mm3 172  --   --  146* 156 199 188   SODIUM mmol/L 140 142  --  137  --   --  135   POTASSIUM mmol/L 3.6 3.7  --  3.3*  --   --  3.8   CHLORIDE mmol/L 106 109  --  106  --   --  102   CO2 mmol/L 27.0 24.0  --  24.0  --   --  20.6*   BUN mg/dL 46* 50*  --  62*  --   --  62*   CREATININE mg/dL 2.10* 2.10*  --  2.10*  --   --  2.33*   GLUCOSE mg/dL 108* 99  --  87  --   --  94   CALCIUM mg/dL 8.7 9.2  --  8.4*  --   --  8.5       Results from last 7 days  Lab Units 03/06/18  0908 03/06/18 0225 03/05/18  2117 03/05/18  1406   BILIRUBIN mg/dL  --   --   --  0.5   ALK PHOS U/L  --   --   --  49   ALT (SGPT) U/L  --   --   --  24   AST (SGOT) U/L  --   --   --  25   PROTIME Seconds 17.6* 22.8* 25.0* 33.0*   INR  1.68* 2.17* 2.38* 3.18*   APTT seconds  --   --  37.3* 41.9*           Invalid input(s): TG, LDLCALC, LDLREALC    Results from last 7 days  Lab Units 03/06/18  0225 03/05/18  2117 03/05/18  1752 03/05/18  1406   TROPONIN I ng/mL 0.049* 0.043* 0.044* 0.043*     Brief Urine Lab Results  (Last result in the past 365 days)      Color   Clarity   Blood   Leuk Est   Nitrite   Protein   CREAT   Urine HCG        12/27/17 1403 Yellow Clear Negative Negative  Negative Negative         12/27/17 1403             50.6             Microbiology Results Abnormal     None          Imaging Results (all)     None                    Results for orders placed during the hospital encounter of 12/26/17   Adult Transthoracic Echo Limited W/ Cont if Necessary Per Protocol    Narrative · The study is technically adequate for diagnosis.  · Left ventricular systolic function is low normal. Estimated EF = 50%.  · Right ventricular cavity is mildly dilated.  · Left atrial cavity size is moderate-to-severely dilated.  · Right atrial cavity size is moderately dilated. The inferior vena cava   is dilated. An electronic lead is present in the right atrium.  · There is no evidence of pericardial effusion. There is a small size left   pleural effusion.     Compared to the study of 7/28/2017, the EF appears improved.            Discharge Details      Tre Kinney   Home Medication Instructions ALVARO:[id number]    Printed on:03/08/18 1021   Medication Information                      ALPRAZolam (XANAX) 0.5 MG tablet  Take 0.5 mg by mouth At Night As Needed for Anxiety. Ordered to take bid prn, but patient only takes nightly prn.             Cholecalciferol (VITAMIN D3) 5000 units tablet tablet  Take 5,000 Units by mouth Daily.             citalopram (CELEXA) 20 MG tablet  Take 20 mg by mouth Daily.             furosemide (LASIX) 40 MG tablet  Take 60 mg by mouth 2 (Two) Times a Day.             gabapentin (NEURONTIN) 400 MG capsule  Take 400 mg by mouth Every Night.             hydrALAZINE (APRESOLINE) 100 MG tablet  Take 1 tablet by mouth Every 8 (Eight) Hours.             HYDROcodone-acetaminophen (NORCO) 7.5-325 MG per tablet  Take 1 tablet by mouth 2 (Two) Times a Day As Needed for Moderate Pain .             isosorbide mononitrate (IMDUR) 60 MG 24 hr tablet  Take 60 mg by mouth Daily.             metoprolol tartrate (LOPRESSOR) 50 MG tablet  Take 50 mg by mouth 2 (Two) Times a Day.              pantoprazole (PROTONIX) 40 MG EC tablet  Take 40 mg by mouth Daily.             potassium chloride (MICRO-K) 10 MEQ CR capsule  Take 10 mEq by mouth 2 (Two) Times a Day.             RA VITAMIN D-3 5000 units capsule               spironolactone (ALDACTONE) 25 MG tablet                     Discharge Disposition:  Home or Self Care    Discharge Diet:  Diet Instructions     Diet: Regular, Cardiac, Soft Texture; Thin Liquids, No Restrictions; Whole; Thin       Discharge Diet:   Regular  Cardiac  Soft Texture      Fluid Consistency:  Thin Liquids, No Restrictions   Soft Options:  Whole   Fluid Consistency:  Thin   Low residue                 Discharge Activity:   Activity Instructions     Activity as Tolerated           Measure Blood Pressure                     Special Instructions:  Pt to continue to hold her coumadin for 3 more days.  Then resume per PCP to manage.  Follow up pcp 2-3 days.     No future appointments.    Additional Instructions for the Follow-ups that You Need to Schedule     Discharge Follow-up with PCP    As directed    Follow Up Details:  2-3 days                     Time Spent on Discharge:  40 minutes    Electronically signed by LANCE Aponte, 18, 10:21 AM.         Electronically signed by LANCE Aponte at 3/8/2018 10:30 AM        [address] [address]  Phone:  [phone]  Fax:  [phone]        Patient:     Tre Kinney MRN:  [id number]   [address] [place], [place] [address] :  1932  SSN:    Phone: [phone] Sex:  F      INSURANCE PAYOR PLAN GROUP # SUBSCRIBER ID   Primary: MEDICARE [id number]   [id number]   Admitting Diagnosis: Anemia [D64.9]  Order Date:  Mar 7, 2018               Inpatient Case Management  Consult       (Order ID: [id number])     Diagnosis:         Priority:  Routine Expected Date:   Expiration Date:        Interval:  Once Count:    Reason for  Consult? Resume home health at discharge.     Specimen Type:   Specimen Source:   Specimen Taken Date:   Specimen Taken Time:                         Verbal Order Mode: Per protocol: cosign required  Authorizing Provider: Padmaja Sue MD  Authorizing Provider's NPI: [id number]     Order Entered By: Tosin Solano 3/7/2018  4:06 PM     Electronically signed by:

## 2018-03-08 NOTE — PROGRESS NOTES
"Adult Nutrition  Assessment/PES    Patient Name:  Tre Kinney  YOB: 1932  MRN: [id number]  Admit Date:  3/5/2018    Assessment Date:  3/8/2018        Reason for Assessment       03/08/18 1135    Reason for Assessment    Reason For Assessment/Visit follow up protocol    Time Spent (min) 20    Diagnosis Diagnosis   per notes this admission    Gastrointestinal Other (comment)   EGD (3/6) mild candidiasis, 5 cm hiatal hernia, gastritis; colonoscopy (3/7) diverticular bleed, 3 small polyps              Nutrition/Diet History       03/08/18 1136    Nutrition/Diet History    Reported/Observed By Patient    Appetite Good    Food Habit/Preferences Dislike Supplement    Other Patient reports tolerating regular texture. Appetite improving. Reports ate well for breakfast but requesting other foods. Does not want supplements.            Anthropometrics       03/08/18 1145    Anthropometrics (Special Considerations)    Height Used for Calculations 1.702 m (5' 7\")    Weight Used for Calculations 85.3 kg (188 lb 0.8 oz)   stated weight per charting 3/8            Labs/Tests/Procedures/Meds       03/08/18 1145    Labs/Tests/Procedures/Meds    Labs/Tests Review Reviewed                Nutrition Prescription Ordered       03/08/18 1145    Nutrition Prescription PO    Current PO Diet Regular    Supplement Clear Liquid Supplement    Supplement Frequency 3 times a day    Other Modifiers Low Fiber;Low Residue            Evaluation of Received Nutrient/Fluid Intake       03/08/18 1151    PO Evaluation    Number of Days PO Intake Evaluated --   no recorded PO intake since on clear liquids          Problem/Interventions:        Problem 2       03/08/18 1152    Nutrition Diagnoses Problem 2    Problem 2 No Nutrition Diagnosis at this Time                  Intervention Goal       03/08/18 1152    Intervention Goal    General Nutrition support treatment    PO Increase intake            Nutrition Intervention       03/08/18 " 1152    Nutrition Intervention    RD/Tech Action Advise alternate selection;Encourage intake;Follow Tx progress;Adjusted supplement;Care plan reviewd              Education/Evaluation       03/08/18 1152    Monitor/Evaluation    Monitor Per protocol;PO intake        Electronically signed by:  Rena Centeno  03/08/18 11:53 AM

## 2018-03-08 NOTE — PROGRESS NOTES
Continued Stay Note  [facility]     Patient Name: Tre Kinney  MRN: [id number]  Today's Date: 3/8/2018    Admit Date: 3/5/2018          Discharge Plan       03/08/18 1324    Case Management/Social Work Plan    Plan update d/c plan    Patient/Family In Agreement With Plan yes    Additional Comments Pt son is now unable to transport pt due to illness. Wheels van will be transporting pt back to [place] around 3pm. Pt is okay with this. CM contacted pt HH company ([employer] ) to let them know pt was leaving and also faxed them the d/c summary as requested. Faxed to [phone]. They will see pt tomorrow. No other d/c needs at this time. CM will follow through d/c.              Discharge Codes     None        Expected Discharge Date and Time     Expected Discharge Date Expected Discharge Time    Mar 8, 2018             Kelli Perez RN

## 2018-03-14 VITALS
BODY MASS INDEX: 29.35 KG/M2 | WEIGHT: 187 LBS | DIASTOLIC BLOOD PRESSURE: 92 MMHG | RESPIRATION RATE: 20 BRPM | HEART RATE: 80 BPM | OXYGEN SATURATION: 98 % | SYSTOLIC BLOOD PRESSURE: 146 MMHG | HEIGHT: 67 IN | TEMPERATURE: 98.1 F

## 2018-05-21 RX ORDER — FUROSEMIDE 20 MG/1
TABLET ORAL
Qty: 60 TABLET | Refills: 6 | OUTPATIENT
Start: 2018-05-21

## 2018-07-09 RX ORDER — FUROSEMIDE 20 MG/1
TABLET ORAL
Qty: 60 TABLET | Refills: 6 | OUTPATIENT
Start: 2018-07-09

## 2018-07-20 RX ORDER — FUROSEMIDE 20 MG/1
TABLET ORAL
Qty: 60 TABLET | Refills: 6 | OUTPATIENT
Start: 2018-07-20

## 2018-10-04 ENCOUNTER — EPISODE CHANGES (OUTPATIENT)
Dept: CASE MANAGEMENT | Facility: OTHER | Age: 83
End: 2018-10-04

## 2019-02-26 ENCOUNTER — LAB (OUTPATIENT)
Dept: LAB | Facility: HOSPITAL | Age: 84
End: 2019-02-26

## 2019-02-26 ENCOUNTER — TRANSCRIBE ORDERS (OUTPATIENT)
Dept: ADMINISTRATIVE | Facility: HOSPITAL | Age: 84
End: 2019-02-26

## 2019-02-26 DIAGNOSIS — N18.9 CHRONIC KIDNEY DISEASE, UNSPECIFIED CKD STAGE: ICD-10-CM

## 2019-02-26 DIAGNOSIS — N18.9 CHRONIC KIDNEY DISEASE, UNSPECIFIED CKD STAGE: Primary | ICD-10-CM

## 2019-02-26 LAB
ALBUMIN SERPL-MCNC: 4.3 G/DL (ref 3.4–4.8)
ALBUMIN/GLOB SERPL: 1.6 G/DL (ref 1.5–2.5)
ALP SERPL-CCNC: 65 U/L (ref 35–104)
ALT SERPL W P-5'-P-CCNC: 22 U/L (ref 10–36)
ANION GAP SERPL CALCULATED.3IONS-SCNC: 8 MMOL/L (ref 3.6–11.2)
AST SERPL-CCNC: 24 U/L (ref 10–30)
BILIRUB SERPL-MCNC: 0.6 MG/DL (ref 0.2–1.8)
BUN BLD-MCNC: 33 MG/DL (ref 7–21)
BUN/CREAT SERPL: 14.7 (ref 7–25)
CALCIUM SPEC-SCNC: 10.9 MG/DL (ref 7.7–10)
CHLORIDE SERPL-SCNC: 102 MMOL/L (ref 99–112)
CO2 SERPL-SCNC: 28 MMOL/L (ref 24.3–31.9)
CREAT BLD-MCNC: 2.25 MG/DL (ref 0.43–1.29)
GFR SERPL CREATININE-BSD FRML MDRD: 21 ML/MIN/1.73
GLOBULIN UR ELPH-MCNC: 2.7 GM/DL
GLUCOSE BLD-MCNC: 99 MG/DL (ref 70–110)
OSMOLALITY SERPL CALC.SUM OF ELEC: 283 MOSM/KG (ref 273–305)
POTASSIUM BLD-SCNC: 3.7 MMOL/L (ref 3.5–5.3)
PROT SERPL-MCNC: 7 G/DL (ref 6–8)
SODIUM BLD-SCNC: 138 MMOL/L (ref 135–153)

## 2019-02-26 PROCEDURE — 36415 COLL VENOUS BLD VENIPUNCTURE: CPT

## 2019-02-26 PROCEDURE — 80053 COMPREHEN METABOLIC PANEL: CPT

## 2019-07-24 ENCOUNTER — LAB (OUTPATIENT)
Dept: LAB | Facility: HOSPITAL | Age: 84
End: 2019-07-24

## 2019-07-24 ENCOUNTER — TRANSCRIBE ORDERS (OUTPATIENT)
Dept: ADMINISTRATIVE | Facility: HOSPITAL | Age: 84
End: 2019-07-24

## 2019-07-24 DIAGNOSIS — N18.9 ANEMIA IN CHRONIC KIDNEY DISEASE, UNSPECIFIED CKD STAGE: ICD-10-CM

## 2019-07-24 DIAGNOSIS — N18.4 CHRONIC KIDNEY DISEASE, STAGE IV (SEVERE) (HCC): ICD-10-CM

## 2019-07-24 DIAGNOSIS — N25.81 HYPERPARATHYROIDISM, SECONDARY RENAL (HCC): ICD-10-CM

## 2019-07-24 DIAGNOSIS — N18.9 ANEMIA IN CHRONIC KIDNEY DISEASE, UNSPECIFIED CKD STAGE: Primary | ICD-10-CM

## 2019-07-24 DIAGNOSIS — D63.1 ANEMIA IN CHRONIC KIDNEY DISEASE, UNSPECIFIED CKD STAGE: ICD-10-CM

## 2019-07-24 DIAGNOSIS — D63.1 ANEMIA IN CHRONIC KIDNEY DISEASE, UNSPECIFIED CKD STAGE: Primary | ICD-10-CM

## 2019-07-24 PROCEDURE — 84100 ASSAY OF PHOSPHORUS: CPT

## 2019-07-24 PROCEDURE — 82306 VITAMIN D 25 HYDROXY: CPT

## 2019-07-24 PROCEDURE — 83970 ASSAY OF PARATHORMONE: CPT

## 2019-07-24 PROCEDURE — 83540 ASSAY OF IRON: CPT

## 2019-07-24 PROCEDURE — 84466 ASSAY OF TRANSFERRIN: CPT

## 2019-07-24 PROCEDURE — 82570 ASSAY OF URINE CREATININE: CPT

## 2019-07-24 PROCEDURE — 82728 ASSAY OF FERRITIN: CPT

## 2019-07-24 PROCEDURE — 84156 ASSAY OF PROTEIN URINE: CPT

## 2019-07-24 PROCEDURE — 82043 UR ALBUMIN QUANTITATIVE: CPT

## 2019-07-24 PROCEDURE — 80053 COMPREHEN METABOLIC PANEL: CPT

## 2019-07-24 PROCEDURE — 36415 COLL VENOUS BLD VENIPUNCTURE: CPT

## 2019-07-24 PROCEDURE — 85025 COMPLETE CBC W/AUTO DIFF WBC: CPT

## 2019-07-25 LAB
25(OH)D3 SERPL-MCNC: 51.7 NG/ML (ref 30–100)
ALBUMIN SERPL-MCNC: 4.2 G/DL (ref 3.5–5.2)
ALBUMIN UR-MCNC: <1.2 MG/L
ALBUMIN/GLOB SERPL: 1.2 G/DL
ALP SERPL-CCNC: 59 U/L (ref 39–117)
ALT SERPL W P-5'-P-CCNC: 7 U/L (ref 1–33)
ANION GAP SERPL CALCULATED.3IONS-SCNC: 17.1 MMOL/L (ref 5–15)
AST SERPL-CCNC: 12 U/L (ref 1–32)
BASOPHILS # BLD AUTO: 0.09 10*3/MM3 (ref 0–0.2)
BASOPHILS NFR BLD AUTO: 1 % (ref 0–1.5)
BILIRUB SERPL-MCNC: 0.5 MG/DL (ref 0.2–1.2)
BUN BLD-MCNC: 36 MG/DL (ref 8–23)
BUN/CREAT SERPL: 13.2 (ref 7–25)
CALCIUM SPEC-SCNC: 9.9 MG/DL (ref 8.6–10.5)
CHLORIDE SERPL-SCNC: 95 MMOL/L (ref 98–107)
CO2 SERPL-SCNC: 25.9 MMOL/L (ref 22–29)
CREAT BLD-MCNC: 2.72 MG/DL (ref 0.57–1)
CREAT UR-MCNC: 48.9 MG/DL
CREAT UR-MCNC: 48.9 MG/DL
DEPRECATED RDW RBC AUTO: 52.8 FL (ref 37–54)
EOSINOPHIL # BLD AUTO: 0.57 10*3/MM3 (ref 0–0.4)
EOSINOPHIL NFR BLD AUTO: 6.3 % (ref 0.3–6.2)
ERYTHROCYTE [DISTWIDTH] IN BLOOD BY AUTOMATED COUNT: 15.5 % (ref 12.3–15.4)
FERRITIN SERPL-MCNC: 49 NG/ML (ref 13–150)
GFR SERPL CREATININE-BSD FRML MDRD: 17 ML/MIN/1.73
GLOBULIN UR ELPH-MCNC: 3.5 GM/DL
GLUCOSE BLD-MCNC: 77 MG/DL (ref 65–99)
HCT VFR BLD AUTO: 41.8 % (ref 34–46.6)
HGB BLD-MCNC: 12.8 G/DL (ref 12–15.9)
IMM GRANULOCYTES # BLD AUTO: 0.02 10*3/MM3 (ref 0–0.05)
IMM GRANULOCYTES NFR BLD AUTO: 0.2 % (ref 0–0.5)
IRON 24H UR-MRATE: 44 MCG/DL (ref 37–145)
IRON SATN MFR SERPL: 11 % (ref 20–50)
LYMPHOCYTES # BLD AUTO: 1.17 10*3/MM3 (ref 0.7–3.1)
LYMPHOCYTES NFR BLD AUTO: 13 % (ref 19.6–45.3)
MCH RBC QN AUTO: 28.4 PG (ref 26.6–33)
MCHC RBC AUTO-ENTMCNC: 30.6 G/DL (ref 31.5–35.7)
MCV RBC AUTO: 92.7 FL (ref 79–97)
MICROALBUMIN/CREAT UR: NORMAL MG/G
MONOCYTES # BLD AUTO: 0.75 10*3/MM3 (ref 0.1–0.9)
MONOCYTES NFR BLD AUTO: 8.3 % (ref 5–12)
NEUTROPHILS # BLD AUTO: 6.4 10*3/MM3 (ref 1.7–7)
NEUTROPHILS NFR BLD AUTO: 71.2 % (ref 42.7–76)
NRBC BLD AUTO-RTO: 0.1 /100 WBC (ref 0–0.2)
PHOSPHATE SERPL-MCNC: 2.7 MG/DL (ref 2.5–4.5)
PLATELET # BLD AUTO: 310 10*3/MM3 (ref 140–450)
PMV BLD AUTO: 11.3 FL (ref 6–12)
POTASSIUM BLD-SCNC: 4.2 MMOL/L (ref 3.5–5.2)
PROT SERPL-MCNC: 7.7 G/DL (ref 6–8.5)
PROT UR-MCNC: 6 MG/DL
PROT/CREAT UR: 122.7 MG/G CREA (ref 0–200)
PTH-INTACT SERPL-MCNC: 208 PG/ML (ref 15–65)
RBC # BLD AUTO: 4.51 10*6/MM3 (ref 3.77–5.28)
SODIUM BLD-SCNC: 138 MMOL/L (ref 136–145)
TIBC SERPL-MCNC: 387 MCG/DL (ref 298–536)
TRANSFERRIN SERPL-MCNC: 260 MG/DL (ref 200–360)
WBC NRBC COR # BLD: 9 10*3/MM3 (ref 3.4–10.8)

## 2019-08-12 ENCOUNTER — TRANSCRIBE ORDERS (OUTPATIENT)
Dept: ADMINISTRATIVE | Facility: HOSPITAL | Age: 84
End: 2019-08-12

## 2019-08-12 ENCOUNTER — LAB (OUTPATIENT)
Dept: LAB | Facility: HOSPITAL | Age: 84
End: 2019-08-12

## 2019-08-12 DIAGNOSIS — N25.81 SECONDARY HYPERPARATHYROIDISM OF RENAL ORIGIN (HCC): Primary | ICD-10-CM

## 2019-08-12 DIAGNOSIS — N25.81 SECONDARY HYPERPARATHYROIDISM OF RENAL ORIGIN (HCC): ICD-10-CM

## 2019-08-12 DIAGNOSIS — D50.9 IRON DEFICIENCY ANEMIA, UNSPECIFIED IRON DEFICIENCY ANEMIA TYPE: ICD-10-CM

## 2019-08-12 DIAGNOSIS — N18.4 CHRONIC RENAL DISEASE, STAGE IV (HCC): ICD-10-CM

## 2019-08-12 LAB
ANION GAP SERPL CALCULATED.3IONS-SCNC: 16.3 MMOL/L (ref 5–15)
BUN BLD-MCNC: 18 MG/DL (ref 8–23)
BUN/CREAT SERPL: 7.8 (ref 7–25)
CALCIUM SPEC-SCNC: 9.3 MG/DL (ref 8.6–10.5)
CHLORIDE SERPL-SCNC: 97 MMOL/L (ref 98–107)
CO2 SERPL-SCNC: 20.7 MMOL/L (ref 22–29)
CREAT BLD-MCNC: 2.32 MG/DL (ref 0.57–1)
GFR SERPL CREATININE-BSD FRML MDRD: 20 ML/MIN/1.73
GLUCOSE BLD-MCNC: 90 MG/DL (ref 65–99)
POTASSIUM BLD-SCNC: 4.6 MMOL/L (ref 3.5–5.2)
SODIUM BLD-SCNC: 134 MMOL/L (ref 136–145)

## 2019-08-12 PROCEDURE — 80048 BASIC METABOLIC PNL TOTAL CA: CPT

## 2019-08-12 PROCEDURE — 36415 COLL VENOUS BLD VENIPUNCTURE: CPT

## 2019-09-18 ENCOUNTER — APPOINTMENT (OUTPATIENT)
Dept: LAB | Facility: HOSPITAL | Age: 84
End: 2019-09-18

## 2019-09-18 ENCOUNTER — TRANSCRIBE ORDERS (OUTPATIENT)
Dept: ADMINISTRATIVE | Facility: HOSPITAL | Age: 84
End: 2019-09-18

## 2019-09-18 DIAGNOSIS — N17.9 ACUTE RENAL FAILURE, UNSPECIFIED ACUTE RENAL FAILURE TYPE (HCC): Primary | ICD-10-CM

## 2019-09-18 PROCEDURE — 85025 COMPLETE CBC W/AUTO DIFF WBC: CPT

## 2019-09-18 PROCEDURE — 83970 ASSAY OF PARATHORMONE: CPT

## 2019-09-18 PROCEDURE — 80048 BASIC METABOLIC PNL TOTAL CA: CPT

## 2019-09-18 PROCEDURE — 84100 ASSAY OF PHOSPHORUS: CPT

## 2019-09-18 PROCEDURE — 36415 COLL VENOUS BLD VENIPUNCTURE: CPT

## 2019-09-19 LAB
ANION GAP SERPL CALCULATED.3IONS-SCNC: 17.1 MMOL/L (ref 5–15)
BASOPHILS # BLD AUTO: 0.07 10*3/MM3 (ref 0–0.2)
BASOPHILS NFR BLD AUTO: 1.1 % (ref 0–1.5)
BUN BLD-MCNC: 29 MG/DL (ref 8–23)
BUN/CREAT SERPL: 10.2 (ref 7–25)
CALCIUM SPEC-SCNC: 8.7 MG/DL (ref 8.6–10.5)
CHLORIDE SERPL-SCNC: 98 MMOL/L (ref 98–107)
CO2 SERPL-SCNC: 24.9 MMOL/L (ref 22–29)
CREAT BLD-MCNC: 2.85 MG/DL (ref 0.57–1)
DEPRECATED RDW RBC AUTO: 49.2 FL (ref 37–54)
EOSINOPHIL # BLD AUTO: 0.61 10*3/MM3 (ref 0–0.4)
EOSINOPHIL NFR BLD AUTO: 9.5 % (ref 0.3–6.2)
ERYTHROCYTE [DISTWIDTH] IN BLOOD BY AUTOMATED COUNT: 15.6 % (ref 12.3–15.4)
GFR SERPL CREATININE-BSD FRML MDRD: 16 ML/MIN/1.73
GLUCOSE BLD-MCNC: 82 MG/DL (ref 65–99)
HCT VFR BLD AUTO: 36 % (ref 34–46.6)
HGB BLD-MCNC: 11.6 G/DL (ref 12–15.9)
IMM GRANULOCYTES # BLD AUTO: 0.01 10*3/MM3 (ref 0–0.05)
IMM GRANULOCYTES NFR BLD AUTO: 0.2 % (ref 0–0.5)
LYMPHOCYTES # BLD AUTO: 0.96 10*3/MM3 (ref 0.7–3.1)
LYMPHOCYTES NFR BLD AUTO: 14.9 % (ref 19.6–45.3)
MCH RBC QN AUTO: 28.3 PG (ref 26.6–33)
MCHC RBC AUTO-ENTMCNC: 32.2 G/DL (ref 31.5–35.7)
MCV RBC AUTO: 87.8 FL (ref 79–97)
MONOCYTES # BLD AUTO: 0.64 10*3/MM3 (ref 0.1–0.9)
MONOCYTES NFR BLD AUTO: 9.9 % (ref 5–12)
NEUTROPHILS # BLD AUTO: 4.16 10*3/MM3 (ref 1.7–7)
NEUTROPHILS NFR BLD AUTO: 64.4 % (ref 42.7–76)
NRBC BLD AUTO-RTO: 0 /100 WBC (ref 0–0.2)
PHOSPHATE SERPL-MCNC: 3.4 MG/DL (ref 2.5–4.5)
PLATELET # BLD AUTO: 254 10*3/MM3 (ref 140–450)
PMV BLD AUTO: 11.4 FL (ref 6–12)
POTASSIUM BLD-SCNC: 3.7 MMOL/L (ref 3.5–5.2)
PTH-INTACT SERPL-MCNC: 243 PG/ML (ref 15–65)
RBC # BLD AUTO: 4.1 10*6/MM3 (ref 3.77–5.28)
SODIUM BLD-SCNC: 140 MMOL/L (ref 136–145)
WBC NRBC COR # BLD: 6.45 10*3/MM3 (ref 3.4–10.8)

## 2020-01-03 ENCOUNTER — TELEPHONE (OUTPATIENT)
Dept: CARDIOLOGY | Facility: CLINIC | Age: 85
End: 2020-01-03

## 2020-05-20 ENCOUNTER — LAB (OUTPATIENT)
Dept: LAB | Facility: HOSPITAL | Age: 85
End: 2020-05-20

## 2020-05-20 ENCOUNTER — TRANSCRIBE ORDERS (OUTPATIENT)
Dept: ADMINISTRATIVE | Facility: HOSPITAL | Age: 85
End: 2020-05-20

## 2020-05-20 DIAGNOSIS — N25.81 SECONDARY RENAL HYPERPARATHYROIDISM (HCC): ICD-10-CM

## 2020-05-20 DIAGNOSIS — N18.4 CHRONIC KIDNEY DISEASE, STAGE IV (SEVERE) (HCC): ICD-10-CM

## 2020-05-20 DIAGNOSIS — N18.9 ANEMIA IN CHRONIC KIDNEY DISEASE, UNSPECIFIED CKD STAGE: ICD-10-CM

## 2020-05-20 DIAGNOSIS — I50.30 DIASTOLIC CONGESTIVE HEART FAILURE, UNSPECIFIED HF CHRONICITY (HCC): ICD-10-CM

## 2020-05-20 DIAGNOSIS — N18.4 CHRONIC KIDNEY DISEASE, STAGE IV (SEVERE) (HCC): Primary | ICD-10-CM

## 2020-05-20 DIAGNOSIS — D63.1 ANEMIA IN CHRONIC KIDNEY DISEASE, UNSPECIFIED CKD STAGE: ICD-10-CM

## 2020-05-20 DIAGNOSIS — Z79.01 CHRONIC ANTICOAGULATION: ICD-10-CM

## 2020-05-20 LAB
ALBUMIN SERPL-MCNC: 3.9 G/DL (ref 3.5–5.2)
ALBUMIN/GLOB SERPL: 1.3 G/DL
ALP SERPL-CCNC: 50 U/L (ref 39–117)
ALT SERPL W P-5'-P-CCNC: 13 U/L (ref 1–33)
ANION GAP SERPL CALCULATED.3IONS-SCNC: 14 MMOL/L (ref 5–15)
AST SERPL-CCNC: 16 U/L (ref 1–32)
BASOPHILS # BLD AUTO: 0.08 10*3/MM3 (ref 0–0.2)
BASOPHILS NFR BLD AUTO: 1.1 % (ref 0–1.5)
BILIRUB SERPL-MCNC: 0.4 MG/DL (ref 0.2–1.2)
BUN BLD-MCNC: 38 MG/DL (ref 8–23)
BUN/CREAT SERPL: 15.3 (ref 7–25)
CALCIUM SPEC-SCNC: 9.4 MG/DL (ref 8.6–10.5)
CHLORIDE SERPL-SCNC: 98 MMOL/L (ref 98–107)
CO2 SERPL-SCNC: 26 MMOL/L (ref 22–29)
CREAT BLD-MCNC: 2.49 MG/DL (ref 0.57–1)
DEPRECATED RDW RBC AUTO: 44.3 FL (ref 37–54)
EOSINOPHIL # BLD AUTO: 0.43 10*3/MM3 (ref 0–0.4)
EOSINOPHIL NFR BLD AUTO: 6 % (ref 0.3–6.2)
ERYTHROCYTE [DISTWIDTH] IN BLOOD BY AUTOMATED COUNT: 13.9 % (ref 12.3–15.4)
FERRITIN SERPL-MCNC: 38.7 NG/ML (ref 13–150)
GFR SERPL CREATININE-BSD FRML MDRD: 18 ML/MIN/1.73
GLOBULIN UR ELPH-MCNC: 3 GM/DL
GLUCOSE BLD-MCNC: 100 MG/DL (ref 65–99)
HCT VFR BLD AUTO: 36.2 % (ref 34–46.6)
HGB BLD-MCNC: 12.2 G/DL (ref 12–15.9)
IMM GRANULOCYTES # BLD AUTO: 0.02 10*3/MM3 (ref 0–0.05)
IMM GRANULOCYTES NFR BLD AUTO: 0.3 % (ref 0–0.5)
INR PPP: 2.38 (ref 0.9–1.1)
IRON 24H UR-MRATE: 53 MCG/DL (ref 37–145)
IRON SATN MFR SERPL: 14 % (ref 20–50)
LYMPHOCYTES # BLD AUTO: 1.05 10*3/MM3 (ref 0.7–3.1)
LYMPHOCYTES NFR BLD AUTO: 14.7 % (ref 19.6–45.3)
MAGNESIUM SERPL-MCNC: 2.3 MG/DL (ref 1.6–2.4)
MCH RBC QN AUTO: 29.9 PG (ref 26.6–33)
MCHC RBC AUTO-ENTMCNC: 33.7 G/DL (ref 31.5–35.7)
MCV RBC AUTO: 88.7 FL (ref 79–97)
MONOCYTES # BLD AUTO: 0.8 10*3/MM3 (ref 0.1–0.9)
MONOCYTES NFR BLD AUTO: 11.2 % (ref 5–12)
NEUTROPHILS # BLD AUTO: 4.76 10*3/MM3 (ref 1.7–7)
NEUTROPHILS NFR BLD AUTO: 66.7 % (ref 42.7–76)
NRBC BLD AUTO-RTO: 0 /100 WBC (ref 0–0.2)
PHOSPHATE SERPL-MCNC: 3.4 MG/DL (ref 2.5–4.5)
PLATELET # BLD AUTO: 241 10*3/MM3 (ref 140–450)
PMV BLD AUTO: 10.3 FL (ref 6–12)
POTASSIUM BLD-SCNC: 4 MMOL/L (ref 3.5–5.2)
PROT SERPL-MCNC: 6.9 G/DL (ref 6–8.5)
PROTHROMBIN TIME: 27.2 SECONDS (ref 11–15.4)
PTH-INTACT SERPL-MCNC: 270 PG/ML (ref 15–65)
RBC # BLD AUTO: 4.08 10*6/MM3 (ref 3.77–5.28)
SODIUM BLD-SCNC: 138 MMOL/L (ref 136–145)
TIBC SERPL-MCNC: 383 MCG/DL (ref 298–536)
TRANSFERRIN SERPL-MCNC: 257 MG/DL (ref 200–360)
WBC NRBC COR # BLD: 7.14 10*3/MM3 (ref 3.4–10.8)

## 2020-05-20 PROCEDURE — 82728 ASSAY OF FERRITIN: CPT

## 2020-05-20 PROCEDURE — 84100 ASSAY OF PHOSPHORUS: CPT

## 2020-05-20 PROCEDURE — 85025 COMPLETE CBC W/AUTO DIFF WBC: CPT

## 2020-05-20 PROCEDURE — 36415 COLL VENOUS BLD VENIPUNCTURE: CPT

## 2020-05-20 PROCEDURE — 80053 COMPREHEN METABOLIC PANEL: CPT

## 2020-05-20 PROCEDURE — 84466 ASSAY OF TRANSFERRIN: CPT

## 2020-05-20 PROCEDURE — 83735 ASSAY OF MAGNESIUM: CPT

## 2020-05-20 PROCEDURE — 83970 ASSAY OF PARATHORMONE: CPT

## 2020-05-20 PROCEDURE — 85610 PROTHROMBIN TIME: CPT

## 2020-05-20 PROCEDURE — 83540 ASSAY OF IRON: CPT

## 2020-08-19 ENCOUNTER — TRANSCRIBE ORDERS (OUTPATIENT)
Dept: ADMINISTRATIVE | Facility: HOSPITAL | Age: 85
End: 2020-08-19

## 2020-08-19 ENCOUNTER — LAB (OUTPATIENT)
Dept: LAB | Facility: HOSPITAL | Age: 85
End: 2020-08-19

## 2020-08-19 DIAGNOSIS — N18.4 CHRONIC KIDNEY DISEASE, STAGE IV (SEVERE) (HCC): ICD-10-CM

## 2020-08-19 DIAGNOSIS — N25.81 SECONDARY HYPERPARATHYROIDISM OF RENAL ORIGIN (HCC): ICD-10-CM

## 2020-08-19 DIAGNOSIS — N18.9 CHRONIC KIDNEY DISEASE, UNSPECIFIED CKD STAGE: Primary | ICD-10-CM

## 2020-08-19 DIAGNOSIS — N18.9 CHRONIC KIDNEY DISEASE, UNSPECIFIED CKD STAGE: ICD-10-CM

## 2020-08-19 PROCEDURE — 83540 ASSAY OF IRON: CPT

## 2020-08-19 PROCEDURE — 85025 COMPLETE CBC W/AUTO DIFF WBC: CPT

## 2020-08-19 PROCEDURE — 36415 COLL VENOUS BLD VENIPUNCTURE: CPT

## 2020-08-19 PROCEDURE — 84100 ASSAY OF PHOSPHORUS: CPT

## 2020-08-19 PROCEDURE — 80053 COMPREHEN METABOLIC PANEL: CPT

## 2020-08-19 PROCEDURE — 83970 ASSAY OF PARATHORMONE: CPT

## 2020-08-19 PROCEDURE — 84466 ASSAY OF TRANSFERRIN: CPT

## 2020-08-19 PROCEDURE — 82728 ASSAY OF FERRITIN: CPT

## 2020-08-20 LAB
ALBUMIN SERPL-MCNC: 3.9 G/DL (ref 3.5–5.2)
ALBUMIN/GLOB SERPL: 1.2 G/DL
ALP SERPL-CCNC: 56 U/L (ref 39–117)
ALT SERPL W P-5'-P-CCNC: 13 U/L (ref 1–33)
ANION GAP SERPL CALCULATED.3IONS-SCNC: 13.2 MMOL/L (ref 5–15)
AST SERPL-CCNC: 17 U/L (ref 1–32)
BASOPHILS # BLD AUTO: 0.05 10*3/MM3 (ref 0–0.2)
BASOPHILS NFR BLD AUTO: 0.8 % (ref 0–1.5)
BILIRUB SERPL-MCNC: 0.6 MG/DL (ref 0–1.2)
BUN SERPL-MCNC: 30 MG/DL (ref 8–23)
BUN/CREAT SERPL: 14.2 (ref 7–25)
CALCIUM SPEC-SCNC: 9.4 MG/DL (ref 8.6–10.5)
CHLORIDE SERPL-SCNC: 101 MMOL/L (ref 98–107)
CO2 SERPL-SCNC: 21.8 MMOL/L (ref 22–29)
CREAT SERPL-MCNC: 2.11 MG/DL (ref 0.57–1)
DEPRECATED RDW RBC AUTO: 45.8 FL (ref 37–54)
EOSINOPHIL # BLD AUTO: 0.33 10*3/MM3 (ref 0–0.4)
EOSINOPHIL NFR BLD AUTO: 5.1 % (ref 0.3–6.2)
ERYTHROCYTE [DISTWIDTH] IN BLOOD BY AUTOMATED COUNT: 13.9 % (ref 12.3–15.4)
FERRITIN SERPL-MCNC: 24.2 NG/ML (ref 13–150)
GFR SERPL CREATININE-BSD FRML MDRD: 22 ML/MIN/1.73
GLOBULIN UR ELPH-MCNC: 3.2 GM/DL
GLUCOSE SERPL-MCNC: 89 MG/DL (ref 65–99)
HCT VFR BLD AUTO: 37.2 % (ref 34–46.6)
HGB BLD-MCNC: 12 G/DL (ref 12–15.9)
IMM GRANULOCYTES # BLD AUTO: 0.01 10*3/MM3 (ref 0–0.05)
IMM GRANULOCYTES NFR BLD AUTO: 0.2 % (ref 0–0.5)
IRON 24H UR-MRATE: 44 MCG/DL (ref 37–145)
IRON SATN MFR SERPL: 10 % (ref 20–50)
LYMPHOCYTES # BLD AUTO: 0.96 10*3/MM3 (ref 0.7–3.1)
LYMPHOCYTES NFR BLD AUTO: 15 % (ref 19.6–45.3)
MCH RBC QN AUTO: 28.9 PG (ref 26.6–33)
MCHC RBC AUTO-ENTMCNC: 32.3 G/DL (ref 31.5–35.7)
MCV RBC AUTO: 89.6 FL (ref 79–97)
MONOCYTES # BLD AUTO: 0.49 10*3/MM3 (ref 0.1–0.9)
MONOCYTES NFR BLD AUTO: 7.6 % (ref 5–12)
NEUTROPHILS NFR BLD AUTO: 4.58 10*3/MM3 (ref 1.7–7)
NEUTROPHILS NFR BLD AUTO: 71.3 % (ref 42.7–76)
NRBC BLD AUTO-RTO: 0 /100 WBC (ref 0–0.2)
PHOSPHATE SERPL-MCNC: 3 MG/DL (ref 2.5–4.5)
PLATELET # BLD AUTO: 239 10*3/MM3 (ref 140–450)
PMV BLD AUTO: 11.2 FL (ref 6–12)
POTASSIUM SERPL-SCNC: 4.1 MMOL/L (ref 3.5–5.2)
PROT SERPL-MCNC: 7.1 G/DL (ref 6–8.5)
PTH-INTACT SERPL-MCNC: 253 PG/ML (ref 15–65)
RBC # BLD AUTO: 4.15 10*6/MM3 (ref 3.77–5.28)
SODIUM SERPL-SCNC: 136 MMOL/L (ref 136–145)
TIBC SERPL-MCNC: 460 MCG/DL (ref 298–536)
TRANSFERRIN SERPL-MCNC: 309 MG/DL (ref 200–360)
WBC # BLD AUTO: 6.42 10*3/MM3 (ref 3.4–10.8)

## 2020-08-26 ENCOUNTER — TRANSCRIBE ORDERS (OUTPATIENT)
Dept: INFUSION THERAPY | Facility: HOSPITAL | Age: 85
End: 2020-08-26

## 2020-08-26 DIAGNOSIS — N18.4 CHRONIC KIDNEY DISEASE, STAGE IV (SEVERE) (HCC): ICD-10-CM

## 2020-08-26 DIAGNOSIS — D50.9 IRON DEFICIENCY ANEMIA, UNSPECIFIED IRON DEFICIENCY ANEMIA TYPE: Primary | ICD-10-CM

## 2020-08-28 ENCOUNTER — HOSPITAL ENCOUNTER (OUTPATIENT)
Dept: INFUSION THERAPY | Facility: HOSPITAL | Age: 85
Discharge: HOME | End: 2020-08-28
Admitting: INTERNAL MEDICINE

## 2020-08-28 VITALS
RESPIRATION RATE: 20 BRPM | TEMPERATURE: 98 F | HEART RATE: 79 BPM | SYSTOLIC BLOOD PRESSURE: 159 MMHG | DIASTOLIC BLOOD PRESSURE: 73 MMHG

## 2020-08-28 DIAGNOSIS — D50.9 IRON DEFICIENCY ANEMIA, UNSPECIFIED IRON DEFICIENCY ANEMIA TYPE: ICD-10-CM

## 2020-08-28 DIAGNOSIS — N18.4 CHRONIC KIDNEY DISEASE, STAGE IV (SEVERE) (HCC): ICD-10-CM

## 2020-08-28 PROCEDURE — 25010000002 FERRIC CARBOXYMALTOSE 750 MG/15ML SOLUTION 15 ML VIAL: Performed by: INTERNAL MEDICINE

## 2020-08-28 PROCEDURE — 96365 THER/PROPH/DIAG IV INF INIT: CPT

## 2020-08-28 RX ADMIN — FERRIC CARBOXYMALTOSE INJECTION 750 MG: 50 INJECTION, SOLUTION INTRAVENOUS at 13:35

## 2021-01-13 ENCOUNTER — LAB (OUTPATIENT)
Dept: LAB | Facility: HOSPITAL | Age: 86
End: 2021-01-13

## 2021-01-13 ENCOUNTER — TRANSCRIBE ORDERS (OUTPATIENT)
Dept: ADMINISTRATIVE | Facility: HOSPITAL | Age: 86
End: 2021-01-13

## 2021-01-13 DIAGNOSIS — D50.9 IRON DEFICIENCY ANEMIA, UNSPECIFIED IRON DEFICIENCY ANEMIA TYPE: ICD-10-CM

## 2021-01-13 DIAGNOSIS — N25.81 SECONDARY HYPERPARATHYROIDISM OF RENAL ORIGIN (HCC): ICD-10-CM

## 2021-01-13 DIAGNOSIS — N18.4 CHRONIC KIDNEY DISEASE, STAGE IV (SEVERE) (HCC): ICD-10-CM

## 2021-01-13 DIAGNOSIS — N18.4 CHRONIC KIDNEY DISEASE, STAGE IV (SEVERE) (HCC): Primary | ICD-10-CM

## 2021-01-13 LAB
ALBUMIN SERPL-MCNC: 4 G/DL (ref 3.5–5.2)
ALBUMIN/GLOB SERPL: 1.5 G/DL
ALP SERPL-CCNC: 56 U/L (ref 39–117)
ALT SERPL W P-5'-P-CCNC: 11 U/L (ref 1–33)
ANION GAP SERPL CALCULATED.3IONS-SCNC: 9.7 MMOL/L (ref 5–15)
AST SERPL-CCNC: 14 U/L (ref 1–32)
BASOPHILS # BLD AUTO: 0.06 10*3/MM3 (ref 0–0.2)
BASOPHILS NFR BLD AUTO: 1.1 % (ref 0–1.5)
BILIRUB SERPL-MCNC: 0.4 MG/DL (ref 0–1.2)
BUN SERPL-MCNC: 40 MG/DL (ref 8–23)
BUN/CREAT SERPL: 19 (ref 7–25)
CALCIUM SPEC-SCNC: 9 MG/DL (ref 8.6–10.5)
CHLORIDE SERPL-SCNC: 102 MMOL/L (ref 98–107)
CO2 SERPL-SCNC: 25.3 MMOL/L (ref 22–29)
CREAT SERPL-MCNC: 2.1 MG/DL (ref 0.57–1)
DEPRECATED RDW RBC AUTO: 42.8 FL (ref 37–54)
EOSINOPHIL # BLD AUTO: 0.34 10*3/MM3 (ref 0–0.4)
EOSINOPHIL NFR BLD AUTO: 6.1 % (ref 0.3–6.2)
ERYTHROCYTE [DISTWIDTH] IN BLOOD BY AUTOMATED COUNT: 13.3 % (ref 12.3–15.4)
FERRITIN SERPL-MCNC: 52.9 NG/ML (ref 13–150)
GFR SERPL CREATININE-BSD FRML MDRD: 22 ML/MIN/1.73
GLOBULIN UR ELPH-MCNC: 2.7 GM/DL
GLUCOSE SERPL-MCNC: 95 MG/DL (ref 65–99)
HCT VFR BLD AUTO: 35.3 % (ref 34–46.6)
HGB BLD-MCNC: 11.5 G/DL (ref 12–15.9)
IMM GRANULOCYTES # BLD AUTO: 0.02 10*3/MM3 (ref 0–0.05)
IMM GRANULOCYTES NFR BLD AUTO: 0.4 % (ref 0–0.5)
IRON 24H UR-MRATE: 58 MCG/DL (ref 37–145)
IRON SATN MFR SERPL: 16 % (ref 20–50)
LYMPHOCYTES # BLD AUTO: 0.77 10*3/MM3 (ref 0.7–3.1)
LYMPHOCYTES NFR BLD AUTO: 13.8 % (ref 19.6–45.3)
MCH RBC QN AUTO: 28.9 PG (ref 26.6–33)
MCHC RBC AUTO-ENTMCNC: 32.6 G/DL (ref 31.5–35.7)
MCV RBC AUTO: 88.7 FL (ref 79–97)
MONOCYTES # BLD AUTO: 0.6 10*3/MM3 (ref 0.1–0.9)
MONOCYTES NFR BLD AUTO: 10.7 % (ref 5–12)
NEUTROPHILS NFR BLD AUTO: 3.8 10*3/MM3 (ref 1.7–7)
NEUTROPHILS NFR BLD AUTO: 67.9 % (ref 42.7–76)
NRBC BLD AUTO-RTO: 0 /100 WBC (ref 0–0.2)
PHOSPHATE SERPL-MCNC: 3 MG/DL (ref 2.5–4.5)
PLATELET # BLD AUTO: 222 10*3/MM3 (ref 140–450)
PMV BLD AUTO: 10.9 FL (ref 6–12)
POTASSIUM SERPL-SCNC: 4.2 MMOL/L (ref 3.5–5.2)
PROT SERPL-MCNC: 6.7 G/DL (ref 6–8.5)
PTH-INTACT SERPL-MCNC: 253 PG/ML (ref 15–65)
RBC # BLD AUTO: 3.98 10*6/MM3 (ref 3.77–5.28)
SODIUM SERPL-SCNC: 137 MMOL/L (ref 136–145)
TIBC SERPL-MCNC: 368 MCG/DL (ref 298–536)
TRANSFERRIN SERPL-MCNC: 247 MG/DL (ref 200–360)
WBC # BLD AUTO: 5.59 10*3/MM3 (ref 3.4–10.8)

## 2021-01-13 PROCEDURE — 84466 ASSAY OF TRANSFERRIN: CPT

## 2021-01-13 PROCEDURE — 80053 COMPREHEN METABOLIC PANEL: CPT

## 2021-01-13 PROCEDURE — 83540 ASSAY OF IRON: CPT

## 2021-01-13 PROCEDURE — 83970 ASSAY OF PARATHORMONE: CPT

## 2021-01-13 PROCEDURE — 84100 ASSAY OF PHOSPHORUS: CPT

## 2021-01-13 PROCEDURE — 85025 COMPLETE CBC W/AUTO DIFF WBC: CPT

## 2021-01-13 PROCEDURE — 82728 ASSAY OF FERRITIN: CPT

## 2021-01-13 PROCEDURE — 36415 COLL VENOUS BLD VENIPUNCTURE: CPT

## 2021-02-12 DIAGNOSIS — Z23 IMMUNIZATION DUE: ICD-10-CM

## 2021-03-15 NOTE — PLAN OF CARE
Problem: Patient Care Overview (Adult)  Goal: Plan of Care Review  Outcome: Ongoing (interventions implemented as appropriate)    06/09/17 2000   Coping/Psychosocial Response Interventions   Plan Of Care Reviewed With patient         06/09/17 2000 06/10/17 0005   Patient Care Overview   Progress --  no change   Coping/Psychosocial Response Interventions   Plan Of Care Reviewed With patient --        Goal: Adult Individualization and Mutuality  Outcome: Ongoing (interventions implemented as appropriate)    Problem: Fall Risk (Adult)  Goal: Identify Related Risk Factors and Signs and Symptoms  Outcome: Ongoing (interventions implemented as appropriate)    06/10/17 0005   Fall Risk   Fall Risk: Related Risk Factors age-related changes;sleep pattern alteration   Fall Risk: Signs and Symptoms presence of risk factors       Goal: Absence of Falls  Outcome: Ongoing (interventions implemented as appropriate)    06/10/17 0005   Fall Risk (Adult)   Absence of Falls making progress toward outcome         Problem: Pressure Ulcer Risk (Diogo Scale) (Adult,Obstetrics,Pediatric)  Goal: Identify Related Risk Factors and Signs and Symptoms  Outcome: Ongoing (interventions implemented as appropriate)    06/10/17 0005   Pressure Ulcer Risk (Diogo Scale)   Related Risk Factors (Pressure Ulcer Risk (Diogo Scale)) age extremes;medication       Goal: Skin Integrity  Outcome: Ongoing (interventions implemented as appropriate)    06/10/17 0005   Pressure Ulcer Risk (Diogo Scale) (Adult,Obstetrics,Pediatric)   Skin Integrity making progress toward outcome         Problem: Infection, Risk/Actual (Adult)  Goal: Identify Related Risk Factors and Signs and Symptoms  Outcome: Ongoing (interventions implemented as appropriate)    06/10/17 0005   Infection, Risk/Actual   Infection, Risk/Actual: Related Risk Factors age extremes;sleep disturbance   Signs and Symptoms (Infection, Risk/Actual) vomiting;weakness       Goal: Infection  Prevention/Resolution  Outcome: Ongoing (interventions implemented as appropriate)    06/10/17 0005   Infection, Risk/Actual (Adult)   Infection Prevention/Resolution making progress toward outcome         Problem: Fluid Volume Excess (Adult,Obstetrics,Pediatric)  Goal: Identify Related Risk Factors and Signs and Symptoms  Outcome: Ongoing (interventions implemented as appropriate)    06/10/17 0005   Fluid Volume Excess   Fluid Volume Excess: Related Risk Factors medication effects   Signs and Symptoms (Fluid Volume Excess) activity intolerance       Goal: Stable Weight  Outcome: Ongoing (interventions implemented as appropriate)    06/10/17 0005   Fluid Volume Excess (Adult,Obstetrics,Pediatric)   Stable Weight making progress toward outcome       Goal: Balanced Intake/Output  Outcome: Ongoing (interventions implemented as appropriate)    06/10/17 0005   Fluid Volume Excess (Adult,Obstetrics,Pediatric)   Balanced Intake/Output making progress toward outcome            dentures/glasses

## 2022-05-02 ENCOUNTER — HOSPITAL ENCOUNTER (EMERGENCY)
Facility: HOSPITAL | Age: 87
Discharge: HOME | End: 2022-05-02
Attending: STUDENT IN AN ORGANIZED HEALTH CARE EDUCATION/TRAINING PROGRAM | Admitting: STUDENT IN AN ORGANIZED HEALTH CARE EDUCATION/TRAINING PROGRAM

## 2022-05-02 ENCOUNTER — APPOINTMENT (OUTPATIENT)
Dept: GENERAL RADIOLOGY | Facility: HOSPITAL | Age: 87
End: 2022-05-02

## 2022-05-02 VITALS
DIASTOLIC BLOOD PRESSURE: 78 MMHG | TEMPERATURE: 98.1 F | WEIGHT: 169 LBS | HEIGHT: 67 IN | BODY MASS INDEX: 26.53 KG/M2 | OXYGEN SATURATION: 97 % | RESPIRATION RATE: 16 BRPM | HEART RATE: 77 BPM | SYSTOLIC BLOOD PRESSURE: 159 MMHG

## 2022-05-02 DIAGNOSIS — M25.571 ACUTE RIGHT ANKLE PAIN: Primary | ICD-10-CM

## 2022-05-02 LAB
ALBUMIN SERPL-MCNC: 3.61 G/DL (ref 3.5–5.2)
ALBUMIN/GLOB SERPL: 1 G/DL
ALP SERPL-CCNC: 59 U/L (ref 39–117)
ALT SERPL W P-5'-P-CCNC: 6 U/L (ref 1–33)
ANION GAP SERPL CALCULATED.3IONS-SCNC: 12.8 MMOL/L (ref 5–15)
APTT PPP: 37.7 SECONDS (ref 26.5–34.5)
AST SERPL-CCNC: 11 U/L (ref 1–32)
BASOPHILS # BLD AUTO: 0.07 10*3/MM3 (ref 0–0.2)
BASOPHILS NFR BLD AUTO: 0.7 % (ref 0–1.5)
BILIRUB SERPL-MCNC: 0.4 MG/DL (ref 0–1.2)
BUN SERPL-MCNC: 35 MG/DL (ref 8–23)
BUN/CREAT SERPL: 14.8 (ref 7–25)
CALCIUM SPEC-SCNC: 9 MG/DL (ref 8.6–10.5)
CHLORIDE SERPL-SCNC: 101 MMOL/L (ref 98–107)
CO2 SERPL-SCNC: 24.2 MMOL/L (ref 22–29)
CREAT SERPL-MCNC: 2.37 MG/DL (ref 0.57–1)
CRP SERPL-MCNC: 0.82 MG/DL (ref 0–0.5)
DEPRECATED RDW RBC AUTO: 46.4 FL (ref 37–54)
EGFRCR SERPLBLD CKD-EPI 2021: 19.2 ML/MIN/1.73
EOSINOPHIL # BLD AUTO: 0.16 10*3/MM3 (ref 0–0.4)
EOSINOPHIL NFR BLD AUTO: 1.6 % (ref 0.3–6.2)
ERYTHROCYTE [DISTWIDTH] IN BLOOD BY AUTOMATED COUNT: 14.2 % (ref 12.3–15.4)
ERYTHROCYTE [SEDIMENTATION RATE] IN BLOOD: 30 MM/HR (ref 0–30)
GLOBULIN UR ELPH-MCNC: 3.5 GM/DL
GLUCOSE SERPL-MCNC: 115 MG/DL (ref 65–99)
HCT VFR BLD AUTO: 37.2 % (ref 34–46.6)
HGB BLD-MCNC: 11.9 G/DL (ref 12–15.9)
IMM GRANULOCYTES # BLD AUTO: 0.02 10*3/MM3 (ref 0–0.05)
IMM GRANULOCYTES NFR BLD AUTO: 0.2 % (ref 0–0.5)
INR PPP: 2.09 (ref 0.9–1.1)
LYMPHOCYTES # BLD AUTO: 0.76 10*3/MM3 (ref 0.7–3.1)
LYMPHOCYTES NFR BLD AUTO: 7.6 % (ref 19.6–45.3)
MCH RBC QN AUTO: 28.6 PG (ref 26.6–33)
MCHC RBC AUTO-ENTMCNC: 32 G/DL (ref 31.5–35.7)
MCV RBC AUTO: 89.4 FL (ref 79–97)
MONOCYTES # BLD AUTO: 0.74 10*3/MM3 (ref 0.1–0.9)
MONOCYTES NFR BLD AUTO: 7.4 % (ref 5–12)
NEUTROPHILS NFR BLD AUTO: 8.29 10*3/MM3 (ref 1.7–7)
NEUTROPHILS NFR BLD AUTO: 82.5 % (ref 42.7–76)
NRBC BLD AUTO-RTO: 0 /100 WBC (ref 0–0.2)
PLATELET # BLD AUTO: 229 10*3/MM3 (ref 140–450)
PMV BLD AUTO: 9.9 FL (ref 6–12)
POTASSIUM SERPL-SCNC: 3.7 MMOL/L (ref 3.5–5.2)
PROT SERPL-MCNC: 7.1 G/DL (ref 6–8.5)
PROTHROMBIN TIME: 24.1 SECONDS (ref 12.1–14.7)
RBC # BLD AUTO: 4.16 10*6/MM3 (ref 3.77–5.28)
SODIUM SERPL-SCNC: 138 MMOL/L (ref 136–145)
WBC NRBC COR # BLD: 10.04 10*3/MM3 (ref 3.4–10.8)

## 2022-05-02 PROCEDURE — 87070 CULTURE OTHR SPECIMN AEROBIC: CPT | Performed by: STUDENT IN AN ORGANIZED HEALTH CARE EDUCATION/TRAINING PROGRAM

## 2022-05-02 PROCEDURE — 96376 TX/PRO/DX INJ SAME DRUG ADON: CPT

## 2022-05-02 PROCEDURE — 73610 X-RAY EXAM OF ANKLE: CPT | Performed by: RADIOLOGY

## 2022-05-02 PROCEDURE — 85730 THROMBOPLASTIN TIME PARTIAL: CPT | Performed by: STUDENT IN AN ORGANIZED HEALTH CARE EDUCATION/TRAINING PROGRAM

## 2022-05-02 PROCEDURE — 96375 TX/PRO/DX INJ NEW DRUG ADDON: CPT

## 2022-05-02 PROCEDURE — 99284 EMERGENCY DEPT VISIT MOD MDM: CPT

## 2022-05-02 PROCEDURE — 36415 COLL VENOUS BLD VENIPUNCTURE: CPT

## 2022-05-02 PROCEDURE — 80053 COMPREHEN METABOLIC PANEL: CPT | Performed by: STUDENT IN AN ORGANIZED HEALTH CARE EDUCATION/TRAINING PROGRAM

## 2022-05-02 PROCEDURE — 85652 RBC SED RATE AUTOMATED: CPT | Performed by: STUDENT IN AN ORGANIZED HEALTH CARE EDUCATION/TRAINING PROGRAM

## 2022-05-02 PROCEDURE — 87015 SPECIMEN INFECT AGNT CONCNTJ: CPT | Performed by: STUDENT IN AN ORGANIZED HEALTH CARE EDUCATION/TRAINING PROGRAM

## 2022-05-02 PROCEDURE — 73610 X-RAY EXAM OF ANKLE: CPT

## 2022-05-02 PROCEDURE — 85610 PROTHROMBIN TIME: CPT | Performed by: STUDENT IN AN ORGANIZED HEALTH CARE EDUCATION/TRAINING PROGRAM

## 2022-05-02 PROCEDURE — 96374 THER/PROPH/DIAG INJ IV PUSH: CPT

## 2022-05-02 PROCEDURE — 25010000002 FENTANYL CITRATE (PF) 50 MCG/ML SOLUTION: Performed by: STUDENT IN AN ORGANIZED HEALTH CARE EDUCATION/TRAINING PROGRAM

## 2022-05-02 PROCEDURE — 85025 COMPLETE CBC W/AUTO DIFF WBC: CPT | Performed by: STUDENT IN AN ORGANIZED HEALTH CARE EDUCATION/TRAINING PROGRAM

## 2022-05-02 PROCEDURE — 25010000002 METHYLPREDNISOLONE PER 125 MG: Performed by: STUDENT IN AN ORGANIZED HEALTH CARE EDUCATION/TRAINING PROGRAM

## 2022-05-02 PROCEDURE — 87205 SMEAR GRAM STAIN: CPT | Performed by: STUDENT IN AN ORGANIZED HEALTH CARE EDUCATION/TRAINING PROGRAM

## 2022-05-02 PROCEDURE — 86140 C-REACTIVE PROTEIN: CPT | Performed by: STUDENT IN AN ORGANIZED HEALTH CARE EDUCATION/TRAINING PROGRAM

## 2022-05-02 RX ORDER — PREDNISONE 20 MG/1
40 TABLET ORAL DAILY
Qty: 7 TABLET | Refills: 0 | Status: SHIPPED | OUTPATIENT
Start: 2022-05-02 | End: 2022-05-02 | Stop reason: SDUPTHER

## 2022-05-02 RX ORDER — PREDNISONE 20 MG/1
40 TABLET ORAL DAILY
Qty: 14 TABLET | Refills: 0 | Status: SHIPPED | OUTPATIENT
Start: 2022-05-02 | End: 2022-05-09

## 2022-05-02 RX ORDER — FENTANYL CITRATE 50 UG/ML
50 INJECTION, SOLUTION INTRAMUSCULAR; INTRAVENOUS
Status: DISCONTINUED | OUTPATIENT
Start: 2022-05-02 | End: 2022-05-02 | Stop reason: HOSPADM

## 2022-05-02 RX ORDER — PREDNISONE 50 MG/1
50 TABLET ORAL DAILY
Qty: 7 TABLET | Refills: 0 | Status: SHIPPED | OUTPATIENT
Start: 2022-05-02 | End: 2022-05-02 | Stop reason: SDUPTHER

## 2022-05-02 RX ORDER — FENTANYL CITRATE 50 UG/ML
50 INJECTION, SOLUTION INTRAMUSCULAR; INTRAVENOUS ONCE AS NEEDED
Status: COMPLETED | OUTPATIENT
Start: 2022-05-02 | End: 2022-05-02

## 2022-05-02 RX ORDER — METHYLPREDNISOLONE SODIUM SUCCINATE 125 MG/2ML
125 INJECTION, POWDER, LYOPHILIZED, FOR SOLUTION INTRAMUSCULAR; INTRAVENOUS ONCE
Status: COMPLETED | OUTPATIENT
Start: 2022-05-02 | End: 2022-05-02

## 2022-05-02 RX ORDER — LIDOCAINE HYDROCHLORIDE 10 MG/ML
10 INJECTION, SOLUTION EPIDURAL; INFILTRATION; INTRACAUDAL; PERINEURAL ONCE
Status: COMPLETED | OUTPATIENT
Start: 2022-05-02 | End: 2022-05-02

## 2022-05-02 RX ADMIN — FENTANYL CITRATE 50 MCG: 50 INJECTION, SOLUTION INTRAMUSCULAR; INTRAVENOUS at 13:00

## 2022-05-02 RX ADMIN — METHYLPREDNISOLONE SODIUM SUCCINATE 125 MG: 125 INJECTION, POWDER, FOR SOLUTION INTRAMUSCULAR; INTRAVENOUS at 18:18

## 2022-05-02 RX ADMIN — FENTANYL CITRATE 50 MCG: 50 INJECTION, SOLUTION INTRAMUSCULAR; INTRAVENOUS at 16:38

## 2022-05-02 RX ADMIN — LIDOCAINE HYDROCHLORIDE 5 ML: 10 INJECTION, SOLUTION EPIDURAL; INFILTRATION; INTRACAUDAL; PERINEURAL at 16:36

## 2022-05-02 NOTE — ED PROVIDER NOTES
[facility]  emergency department encounter        Pt Name: Tre Kinney  MRN: [id number]  Birthdate 1932  Date of evaluation: 2022    Chief Complaint   Patient presents with   • Ankle Pain             HISTORY OF PRESENT ILLNESS:   Tre Kinney is a 89 y.o. female PMH HTN, CHF, CKD IV, CAD, anemia, DVT.  Patient presents for pain at the right ankle onset 3 days ago.  Denies trauma or falls.  Denies history of gout or joint infection.  Believe symptoms are due to her arthritis.  Pain is aching, constant, progressively worsening, exacerbated with movement, alleviated with rest.  Patient endorses numerous other chronic issues but otherwise broadly denies ROS for acute complaints.      No other exacerbating or alleviating factors other than as noted above.  Severity: Severe    PCP: Jessica Pedro APRN          REVIEW OF SYSTEMS:     Review of Systems   Constitutional: Negative for fever.   HENT: Negative for congestion and rhinorrhea.    Eyes: Negative for visual disturbance.   Respiratory: Negative for cough and shortness of breath.    Cardiovascular: Negative for chest pain.   Gastrointestinal: Negative for abdominal pain, nausea and vomiting.   Genitourinary: Negative for dysuria.   Musculoskeletal: Positive for arthralgias (Right ankle). Negative for myalgias.   Skin: Negative for rash.   Neurological: Negative for headaches.   Psychiatric/Behavioral: Negative for confusion.       Review of systems otherwise as per HPI.          PREVIOUS HISTORY:         Past Medical History:   Diagnosis Date   • Anemia    • Arthritis    • CHF (congestive heart failure) (HCC)    • Chronic kidney disease    • Coronary artery disease    • H/O blood clots    • History of transfusion     no reaction    • Hypertension            Past Surgical History:   Procedure Laterality Date   • ABDOMINAL SURGERY     • APPENDECTOMY     • CARDIAC SURGERY     •  SECTION     • COLONOSCOPY N/A 3/7/2018    Procedure:  COLONOSCOPY;  Surgeon: Willie Gresham MD;  Location:  [facility];  Service:    • CORONARY ANGIOPLASTY WITH STENT PLACEMENT     • ENDOSCOPY N/A 3/6/2018    Procedure: ESOPHAGOGASTRODUODENOSCOPY;  Surgeon: Willie Gresham MD;  Location:  [facility];  Service:    • PACEMAKER IMPLANTATION      left side    • SKIN BIOPSY     • TUBAL ABDOMINAL LIGATION             Social History     Socioeconomic History   • Marital status:    Tobacco Use   • Smoking status: Never Smoker   Substance and Sexual Activity   • Alcohol use: No   • Drug use: No   • Sexual activity: Defer           Family History   Problem Relation Age of Onset   • Other Mother         cardiac disorder   • Hypertension Mother    • Heart disease Mother    • Thyroid disease Mother    • Other Father         cardiac disorder   • Heart disease Father    • Hypertension Father    • Other Sister         cardiac disorder   • Other Brother         cardiac disorder   • Other Other         cardiac disorder   • Other Other         cardiac disorder         Current Outpatient Medications   Medication Instructions   • ALPRAZolam (XANAX) 0.5 mg, Oral, Nightly PRN, Ordered to take bid prn, but patient only takes nightly prn.   • citalopram (CELEXA) 20 mg, Oral, Daily   • furosemide (LASIX) 60 mg, Oral, 2 Times Daily   • gabapentin (NEURONTIN) 400 mg, Oral, Nightly   • hydrALAZINE (APRESOLINE) 100 mg, Oral, Every 8 Hours Scheduled   • HYDROcodone-acetaminophen (NORCO) 7.5-325 MG per tablet 1 tablet, Oral, 2 Times Daily PRN   • isosorbide mononitrate (IMDUR) 60 mg, Oral, Daily   • metoprolol tartrate (LOPRESSOR) 50 mg, Oral, 2 Times Daily   • pantoprazole (PROTONIX) 40 mg, Oral, Daily   • potassium chloride (MICRO-K) 10 MEQ CR capsule 10 mEq, Oral, 2 Times Daily   • predniSONE (DELTASONE) 40 mg, Oral, Daily   • RA VITAMIN D-3 5000 units capsule No dose, route, or frequency recorded.   • spironolactone (ALDACTONE) 25 MG tablet No dose, route, or frequency recorded.  "  • vitamin D3 5,000 Units, Oral, Daily         Allergies:  Adhesive tape    Medications, allergies and past medical, surgical, family, and social history reviewed.        PHYSICAL EXAM:     Physical Exam  Vitals and nursing note reviewed.   Constitutional:       General: She is not in acute distress.  HENT:      Head: Normocephalic and atraumatic.   Eyes:      Extraocular Movements: Extraocular movements intact.      Conjunctiva/sclera: Conjunctivae normal.   Cardiovascular:      Rate and Rhythm: Normal rate and regular rhythm.   Pulmonary:      Effort: Pulmonary effort is normal. No respiratory distress.   Abdominal:      General: Abdomen is flat. There is no distension.   Musculoskeletal:         General: Tenderness present. No deformity.      Cervical back: Normal range of motion. No rigidity.      Comments: Patient's exam is obfuscated by the fact that she jumps and screams \"ow\" when palpated throughout numerous areas of her lower legs despite no acute complaint.  She is significantly tender most focally to the lateral portion of the right ankle with some apparent localized swelling.   Skin:     General: Skin is warm.      Findings: No erythema.      Comments: Lateral portion of the right ankle is warm relative to the left.  No significant superficial discoloration.   Neurological:      General: No focal deficit present.      Mental Status: She is alert and oriented to person, place, and time.      Sensory: No sensory deficit.      Comments: Limited dorsiflexion and plantarflexion of the right ankle secondary to pain.  Distal sensation intact to light touch in the right foot.   Psychiatric:         Mood and Affect: Mood normal.         Behavior: Behavior normal.             COMPLETED DIAGNOSTIC STUDIES AND INTERVENTIONS:     Lab Results (last 24 hours)     Procedure Component Value Units Date/Time    Comprehensive Metabolic Panel [[id number]]  (Abnormal) Collected: 05/02/22 0841    Specimen: Blood Updated: " 05/02/22 0922     Glucose 115 mg/dL      BUN 35 mg/dL      Creatinine 2.37 mg/dL      Sodium 138 mmol/L      Potassium 3.7 mmol/L      Chloride 101 mmol/L      CO2 24.2 mmol/L      Calcium 9.0 mg/dL      Total Protein 7.1 g/dL      Albumin 3.61 g/dL      ALT (SGPT) 6 U/L      AST (SGOT) 11 U/L      Alkaline Phosphatase 59 U/L      Total Bilirubin 0.4 mg/dL      Globulin 3.5 gm/dL      A/G Ratio 1.0 g/dL      BUN/Creatinine Ratio 14.8     Anion Gap 12.8 mmol/L      eGFR 19.2 mL/min/1.73      Comment: National Kidney Foundation and American Society of Nephrology (ASN) Task Force recommended calculation based on the Chronic Kidney Disease Epidemiology Collaboration (CKD-EPI) equation refit without adjustment for race.       Narrative:      GFR Normal >60  Chronic Kidney Disease <60  Kidney Failure <15      CBC & Differential [[id number]]  (Abnormal) Collected: 05/02/22 0841    Specimen: Blood Updated: 05/02/22 0849    Narrative:      The following orders were created for panel order CBC & Differential.  Procedure                               Abnormality         Status                     ---------                               -----------         ------                     CBC Auto Differential[[id number]]        Abnormal            Final result                 Please view results for these tests on the individual orders.    Sedimentation Rate [[id number]]  (Normal) Collected: 05/02/22 0841    Specimen: Blood Updated: 05/02/22 0851     Sed Rate 30 mm/hr     C-reactive Protein [[id number]]  (Abnormal) Collected: 05/02/22 0841    Specimen: Blood Updated: 05/02/22 0922     C-Reactive Protein 0.82 mg/dL     aPTT [[id number]]  (Abnormal) Collected: 05/02/22 0841    Specimen: Blood Updated: 05/02/22 0856     PTT 37.7 seconds     Narrative:      PTT Heparin Therapeutic Range:  59 - 95 seconds      Protime-INR [[id number]]  (Abnormal) Collected: 05/02/22 0841    Specimen: Blood Updated: 05/02/22 0856     Protime 24.1 Seconds       INR 2.09    Narrative:      Suggested INR therapeutic range for stable oral anticoagulant therapy:    Low Intensity therapy:   1.5-2.0  Moderate Intensity therapy:   2.0-3.0  High Intensity therapy:   2.5-4.0    CBC Auto Differential [[id number]]  (Abnormal) Collected: 05/02/22 0841    Specimen: Blood Updated: 05/02/22 0849     WBC 10.04 10*3/mm3      RBC 4.16 10*6/mm3      Hemoglobin 11.9 g/dL      Hematocrit 37.2 %      MCV 89.4 fL      MCH 28.6 pg      MCHC 32.0 g/dL      RDW 14.2 %      RDW-SD 46.4 fl      MPV 9.9 fL      Platelets 229 10*3/mm3      Neutrophil % 82.5 %      Lymphocyte % 7.6 %      Monocyte % 7.4 %      Eosinophil % 1.6 %      Basophil % 0.7 %      Immature Grans % 0.2 %      Neutrophils, Absolute 8.29 10*3/mm3      Lymphocytes, Absolute 0.76 10*3/mm3      Monocytes, Absolute 0.74 10*3/mm3      Eosinophils, Absolute 0.16 10*3/mm3      Basophils, Absolute 0.07 10*3/mm3      Immature Grans, Absolute 0.02 10*3/mm3      nRBC 0.0 /100 WBC     Body Fluid Culture - Body Fluid, Ankle, Right [[id number]] Collected: 05/02/22 1708    Specimen: Body Fluid from Ankle, Right Updated: 05/02/22 1718            XR Ankle 3+ View Right   Final Result   1.  Old healed distal fibula fracture. No acute fracture or dislocation   identified.   2.  Mild lateral soft tissue swelling.       This report was finalized on 5/2/2022 9:11 AM by Dr. Andres Guerra MD.                New Medications Ordered This Visit   Medications   • lidocaine PF 1% (XYLOCAINE) injection 10 mL   • fentaNYL citrate (PF) (SUBLIMAZE) injection 50 mcg   • fentaNYL citrate (PF) (SUBLIMAZE) injection 50 mcg   • methylPREDNISolone sodium succinate (SOLU-Medrol) injection 125 mg   • predniSONE (DELTASONE) 20 MG tablet     Sig: Take 2 tablets by mouth Daily for 4 days.     Dispense:  7 tablet     Refill:  0         Joint Aspiration/Injection    Date/Time: 5/2/2022 5:03 PM  Performed by: Andres Combs MD  Authorized by: Andres Combs MD     Consent:      Consent obtained:  Verbal and written    Consent given by:  Patient    Risks, benefits, and alternatives were discussed: yes      Risks discussed:  Bleeding, infection, nerve damage, incomplete drainage and pain  Universal protocol:     Patient identity confirmed:  Verbally with patient  Location:     Location:  Ankle    Ankle:  R ankle  Anesthesia:     Anesthesia method:  Local infiltration    Local anesthetic:  Lidocaine 1% w/o epi  Procedure details:     Preparation: Patient was prepped and draped in usual sterile fashion      Needle gauge:  18 G    Ultrasound guidance: yes      Approach:  Anterior    Aspirate amount:  1ml    Aspirate characteristics:  Bloody    Steroid injected: no      Specimen collected: yes    Post-procedure details:     Dressing:  Sterile dressing    Procedure completion:  Tolerated well, no immediate complications                MEDICAL DECISION-MAKING AND ED COURSE:     ED Course as of 05/02/22 1830   Mon May 02, 2022   0821 MDM: 89-year-old female presents for known atraumatic right ankle pain new onset 3 days ago.  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  Work-up initiated with checking inflammatory markers, XR. [KP]   0940 XR Ankle 3+ View Right  IMPRESSION:  1.  Old healed distal fibula fracture. No acute fracture or dislocation  identified.  2.  Mild lateral soft tissue swelling. [KP]   0940 WBC: 10.04 [KP]   0940 C-Reactive Protein(!): 0.82 [KP]   0940 Sed Rate: 30 [KP]   0940 Creatinine(!): 2.37  Similar to baseline no DIANA [KP]   1811 Arthrocentesis was completed, minimal bloody output.  Discussed case again with Dr. Khan who noted that if there is no significant aspiration of the joint, unlikely to be a large volume of fluid and therefore unlikely to be crystal arthropathy or septic joint.  Given patient had a very low concern for septic joint at onset, noted patient can be started on steroids and follow-up with him outpatient. [KP]   1812 Discussed with lab  technicians who reported if output is only roughly 1 mL of blood that he can do culture however will be unable to test for crystal arthropathy or septic joint.  Body fluid culture ordered for right tibiotalar joint. [KP]   1829 Patient ambulated with assistance.  Likely baseline given she uses a walker at home.  Patient was reports he has a wheelchair at home.  Patient to be discharged and follow-up with Dr. Khan in 1 week.  Patient to take prednisone for the following week.  Return here for new onset or worsening symptoms.  Discussed with patient concern for septic joint though concern is very low at this time.  Patient agreeable to plan. [KP]      ED Course User Index  [KP] Andres Combs MD       ?      FINAL IMPRESSION:       1. Acute right ankle pain         The complaints listed here are new problems to this examiner.      FOLLOW-UP  Andres Khan MD  [address] [place] [address] [address] [place] [address] [phone]    Schedule an appointment as soon as possible for a visit in 1 week        DISPOSITION  ED Disposition     ED Disposition   Discharge    Condition   Stable    Comment   --                   This care is provided during an unprecedented national emergency due to the Novel Coronavirus (COVID-19). COVID-19 infections and transmission risks place heavy strains on healthcare resources. As this pandemic evolves, the Hospital and providers strive to respond fluidly, to remain operational, and to provide care relative to available resources and information. Outcomes are unpredictable and treatments are without well-defined guidelines. Further, the impact of COVID-19 on all aspects of emergency care, including the impact to patients seeking care for reasons other than COVID-19, is unavoidable during this national emergency.    This note was dictated using a [id number] tool. Occasional wrong-word or 'sound-a-like' substitutions may have occurred due to the inherent limitations of voice recognition  software. ?Read the chart carefully and recognize, using context, where substitutions have occurred.    Andres Combs MD  18:30 EDT  5/2/2022             Andres Combs MD  05/02/22 6069

## 2022-05-07 LAB
BACTERIA FLD CULT: NORMAL
GRAM STN SPEC: NORMAL
GRAM STN SPEC: NORMAL

## 2022-09-08 ENCOUNTER — APPOINTMENT (OUTPATIENT)
Dept: CT IMAGING | Facility: HOSPITAL | Age: 87
End: 2022-09-08

## 2022-09-08 ENCOUNTER — APPOINTMENT (OUTPATIENT)
Dept: GENERAL RADIOLOGY | Facility: HOSPITAL | Age: 87
End: 2022-09-08

## 2022-09-08 ENCOUNTER — HOSPITAL ENCOUNTER (EMERGENCY)
Facility: HOSPITAL | Age: 87
Discharge: HOME | End: 2022-09-08
Attending: STUDENT IN AN ORGANIZED HEALTH CARE EDUCATION/TRAINING PROGRAM | Admitting: STUDENT IN AN ORGANIZED HEALTH CARE EDUCATION/TRAINING PROGRAM

## 2022-09-08 VITALS
OXYGEN SATURATION: 100 % | HEIGHT: 67 IN | TEMPERATURE: 97.7 F | RESPIRATION RATE: 18 BRPM | BODY MASS INDEX: 26.53 KG/M2 | SYSTOLIC BLOOD PRESSURE: 168 MMHG | WEIGHT: 169 LBS | DIASTOLIC BLOOD PRESSURE: 98 MMHG | HEART RATE: 78 BPM

## 2022-09-08 DIAGNOSIS — S32.010A COMPRESSION FRACTURE OF L1 VERTEBRA, INITIAL ENCOUNTER: Primary | ICD-10-CM

## 2022-09-08 LAB
ALBUMIN SERPL-MCNC: 4.03 G/DL (ref 3.5–5.2)
ALBUMIN/GLOB SERPL: 1.4 G/DL
ALP SERPL-CCNC: 73 U/L (ref 39–117)
ALT SERPL W P-5'-P-CCNC: 8 U/L (ref 1–33)
ANION GAP SERPL CALCULATED.3IONS-SCNC: 12.8 MMOL/L (ref 5–15)
AST SERPL-CCNC: 15 U/L (ref 1–32)
BASOPHILS # BLD AUTO: 0.07 10*3/MM3 (ref 0–0.2)
BASOPHILS NFR BLD AUTO: 0.8 % (ref 0–1.5)
BILIRUB SERPL-MCNC: 0.5 MG/DL (ref 0–1.2)
BILIRUB UR QL STRIP: NEGATIVE
BUN SERPL-MCNC: 38 MG/DL (ref 8–23)
BUN/CREAT SERPL: 15.5 (ref 7–25)
CALCIUM SPEC-SCNC: 9.9 MG/DL (ref 8.6–10.5)
CHLORIDE SERPL-SCNC: 100 MMOL/L (ref 98–107)
CLARITY UR: ABNORMAL
CO2 SERPL-SCNC: 24.2 MMOL/L (ref 22–29)
COLOR UR: YELLOW
CREAT SERPL-MCNC: 2.45 MG/DL (ref 0.57–1)
CRP SERPL-MCNC: 0.35 MG/DL (ref 0–0.5)
DEPRECATED RDW RBC AUTO: 50 FL (ref 37–54)
EGFRCR SERPLBLD CKD-EPI 2021: 18.4 ML/MIN/1.73
EOSINOPHIL # BLD AUTO: 0.09 10*3/MM3 (ref 0–0.4)
EOSINOPHIL NFR BLD AUTO: 1 % (ref 0.3–6.2)
ERYTHROCYTE [DISTWIDTH] IN BLOOD BY AUTOMATED COUNT: 15.4 % (ref 12.3–15.4)
ERYTHROCYTE [SEDIMENTATION RATE] IN BLOOD: 41 MM/HR (ref 0–30)
GLOBULIN UR ELPH-MCNC: 2.9 GM/DL
GLUCOSE SERPL-MCNC: 106 MG/DL (ref 65–99)
GLUCOSE UR STRIP-MCNC: NEGATIVE MG/DL
HCT VFR BLD AUTO: 38.8 % (ref 34–46.6)
HGB BLD-MCNC: 12.4 G/DL (ref 12–15.9)
HGB UR QL STRIP.AUTO: NEGATIVE
HOLD SPECIMEN: NORMAL
HOLD SPECIMEN: NORMAL
IMM GRANULOCYTES # BLD AUTO: 0.03 10*3/MM3 (ref 0–0.05)
IMM GRANULOCYTES NFR BLD AUTO: 0.3 % (ref 0–0.5)
KETONES UR QL STRIP: NEGATIVE
LEUKOCYTE ESTERASE UR QL STRIP.AUTO: NEGATIVE
LYMPHOCYTES # BLD AUTO: 1.06 10*3/MM3 (ref 0.7–3.1)
LYMPHOCYTES NFR BLD AUTO: 12.3 % (ref 19.6–45.3)
MCH RBC QN AUTO: 28.2 PG (ref 26.6–33)
MCHC RBC AUTO-ENTMCNC: 32 G/DL (ref 31.5–35.7)
MCV RBC AUTO: 88.4 FL (ref 79–97)
MONOCYTES # BLD AUTO: 0.54 10*3/MM3 (ref 0.1–0.9)
MONOCYTES NFR BLD AUTO: 6.3 % (ref 5–12)
NEUTROPHILS NFR BLD AUTO: 6.81 10*3/MM3 (ref 1.7–7)
NEUTROPHILS NFR BLD AUTO: 79.3 % (ref 42.7–76)
NITRITE UR QL STRIP: NEGATIVE
NRBC BLD AUTO-RTO: 0 /100 WBC (ref 0–0.2)
PH UR STRIP.AUTO: 5.5 [PH] (ref 5–8)
PLATELET # BLD AUTO: 294 10*3/MM3 (ref 140–450)
PMV BLD AUTO: 10.2 FL (ref 6–12)
POTASSIUM SERPL-SCNC: 4.7 MMOL/L (ref 3.5–5.2)
PROT SERPL-MCNC: 6.9 G/DL (ref 6–8.5)
PROT UR QL STRIP: NEGATIVE
RBC # BLD AUTO: 4.39 10*6/MM3 (ref 3.77–5.28)
SODIUM SERPL-SCNC: 137 MMOL/L (ref 136–145)
SP GR UR STRIP: 1.01 (ref 1–1.03)
TROPONIN T SERPL-MCNC: 0.01 NG/ML (ref 0–0.03)
UROBILINOGEN UR QL STRIP: ABNORMAL
WBC NRBC COR # BLD: 8.6 10*3/MM3 (ref 3.4–10.8)
WHOLE BLOOD HOLD COAG: NORMAL
WHOLE BLOOD HOLD SPECIMEN: NORMAL

## 2022-09-08 PROCEDURE — 25010000002 ONDANSETRON PER 1 MG: Performed by: EMERGENCY MEDICINE

## 2022-09-08 PROCEDURE — 81003 URINALYSIS AUTO W/O SCOPE: CPT | Performed by: PHYSICIAN ASSISTANT

## 2022-09-08 PROCEDURE — 86140 C-REACTIVE PROTEIN: CPT | Performed by: PHYSICIAN ASSISTANT

## 2022-09-08 PROCEDURE — 25010000002 MORPHINE PER 10 MG: Performed by: EMERGENCY MEDICINE

## 2022-09-08 PROCEDURE — 85025 COMPLETE CBC W/AUTO DIFF WBC: CPT | Performed by: PHYSICIAN ASSISTANT

## 2022-09-08 PROCEDURE — 80053 COMPREHEN METABOLIC PANEL: CPT | Performed by: PHYSICIAN ASSISTANT

## 2022-09-08 PROCEDURE — 96374 THER/PROPH/DIAG INJ IV PUSH: CPT

## 2022-09-08 PROCEDURE — 93010 ELECTROCARDIOGRAM REPORT: CPT | Performed by: INTERNAL MEDICINE

## 2022-09-08 PROCEDURE — 72128 CT CHEST SPINE W/O DYE: CPT

## 2022-09-08 PROCEDURE — 96375 TX/PRO/DX INJ NEW DRUG ADDON: CPT

## 2022-09-08 PROCEDURE — 99284 EMERGENCY DEPT VISIT MOD MDM: CPT

## 2022-09-08 PROCEDURE — 85652 RBC SED RATE AUTOMATED: CPT | Performed by: PHYSICIAN ASSISTANT

## 2022-09-08 PROCEDURE — 84484 ASSAY OF TROPONIN QUANT: CPT | Performed by: PHYSICIAN ASSISTANT

## 2022-09-08 PROCEDURE — 72131 CT LUMBAR SPINE W/O DYE: CPT

## 2022-09-08 PROCEDURE — 72125 CT NECK SPINE W/O DYE: CPT

## 2022-09-08 PROCEDURE — 70450 CT HEAD/BRAIN W/O DYE: CPT

## 2022-09-08 PROCEDURE — 72192 CT PELVIS W/O DYE: CPT

## 2022-09-08 PROCEDURE — 71045 X-RAY EXAM CHEST 1 VIEW: CPT

## 2022-09-08 PROCEDURE — 93005 ELECTROCARDIOGRAM TRACING: CPT | Performed by: PHYSICIAN ASSISTANT

## 2022-09-08 RX ORDER — ONDANSETRON 2 MG/ML
4 INJECTION INTRAMUSCULAR; INTRAVENOUS ONCE
Status: COMPLETED | OUTPATIENT
Start: 2022-09-08 | End: 2022-09-08

## 2022-09-08 RX ORDER — HYDROCODONE BITARTRATE AND ACETAMINOPHEN 7.5; 325 MG/1; MG/1
1 TABLET ORAL EVERY 6 HOURS PRN
Qty: 12 TABLET | Refills: 0 | Status: SHIPPED | OUTPATIENT
Start: 2022-09-08 | End: 2022-09-11

## 2022-09-08 RX ADMIN — ONDANSETRON 4 MG: 2 INJECTION INTRAMUSCULAR; INTRAVENOUS at 20:17

## 2022-09-08 RX ADMIN — MORPHINE SULFATE 4 MG: 4 INJECTION, SOLUTION INTRAMUSCULAR; INTRAVENOUS at 20:33

## 2022-09-08 NOTE — ED PROVIDER NOTES
Subjective   PIT    This is an 89 year old female patient who presents to the ER with chief complaint of back pain. Patient had a fall 2 weeks ago. Unknown if she passed out or if she just slipped and fell. No sure if she hit her head. She lives at home and utilizes a walker for ambulation assistance. She now has pain in her bilateral low back radiating into her bilateral lower abdomen. No bowel/bladder dysfunction. No numbness/tingling.           Review of Systems   Constitutional: Negative.  Negative for fever.   HENT: Negative.    Respiratory: Negative.    Cardiovascular: Negative.  Negative for chest pain.   Gastrointestinal: Positive for abdominal pain. Negative for abdominal distention, anal bleeding, blood in stool, constipation, diarrhea, nausea, rectal pain and vomiting.   Endocrine: Negative.    Genitourinary: Negative.  Negative for dysuria.   Musculoskeletal: Positive for back pain. Negative for arthralgias, gait problem, joint swelling, myalgias, neck pain and neck stiffness.   Skin: Negative.    Neurological: Negative.    Psychiatric/Behavioral: Negative.    All other systems reviewed and are negative.      Past Medical History:   Diagnosis Date   • Anemia    • Arthritis    • CHF (congestive heart failure) (HCC)    • Chronic kidney disease    • Coronary artery disease    • H/O blood clots    • History of transfusion     no reaction    • Hypertension        Allergies   Allergen Reactions   • Adhesive Tape Dermatitis       Past Surgical History:   Procedure Laterality Date   • ABDOMINAL SURGERY     • APPENDECTOMY     • CARDIAC SURGERY     •  SECTION     • COLONOSCOPY N/A 3/7/2018    Procedure: COLONOSCOPY;  Surgeon: Willie Gresham MD;  Location:  [facility];  Service:    • CORONARY ANGIOPLASTY WITH STENT PLACEMENT     • ENDOSCOPY N/A 3/6/2018    Procedure: ESOPHAGOGASTRODUODENOSCOPY;  Surgeon: Willie Gresham MD;  Location:  [facility];  Service:    • PACEMAKER IMPLANTATION      left  side    • SKIN BIOPSY     • TUBAL ABDOMINAL LIGATION         Family History   Problem Relation Age of Onset   • Other Mother         cardiac disorder   • Hypertension Mother    • Heart disease Mother    • Thyroid disease Mother    • Other Father         cardiac disorder   • Heart disease Father    • Hypertension Father    • Other Sister         cardiac disorder   • Other Brother         cardiac disorder   • Other Other         cardiac disorder   • Other Other         cardiac disorder       Social History     Socioeconomic History   • Marital status:    Tobacco Use   • Smoking status: Never Smoker   Substance and Sexual Activity   • Alcohol use: No   • Drug use: No   • Sexual activity: Defer           Objective   Physical Exam  Vitals and nursing note reviewed.   Constitutional:       General: She is not in acute distress.     Appearance: She is well-developed. She is not diaphoretic.   HENT:      Head: Normocephalic and atraumatic.      Right Ear: External ear normal.      Left Ear: External ear normal.      Nose: Nose normal.   Eyes:      Conjunctiva/sclera: Conjunctivae normal.      Pupils: Pupils are equal, round, and reactive to light.   Neck:      Vascular: No JVD.      Trachea: No tracheal deviation.   Cardiovascular:      Rate and Rhythm: Normal rate and regular rhythm.      Heart sounds: Normal heart sounds. No murmur heard.  Pulmonary:      Effort: Pulmonary effort is normal. No respiratory distress.      Breath sounds: Normal breath sounds. No wheezing.   Abdominal:      General: Bowel sounds are normal.      Palpations: Abdomen is soft.      Tenderness: There is no abdominal tenderness.   Musculoskeletal:         General: Tenderness and signs of injury present. No deformity.      Cervical back: Normal range of motion and neck supple.      Comments: Skin about low back intact with no bruising, abrasion or edema. Tenderness to palpation noted in the paraspinal muscles of the Lspine. Good ROM in BLE.  Neurovascular status and sensation BLE intact.    Skin:     General: Skin is warm and dry.      Coloration: Skin is not pale.      Findings: No erythema or rash.   Neurological:      Mental Status: She is alert and oriented to person, place, and time.      Cranial Nerves: No cranial nerve deficit.      Sensory: No sensory deficit.      Motor: No weakness.      Coordination: Coordination normal.      Gait: Gait normal.      Deep Tendon Reflexes: Reflexes normal.   Psychiatric:         Behavior: Behavior normal.         Thought Content: Thought content normal.         Splint - Cast - Strapping    Date/Time: 9/8/2022 7:32 PM  Performed by: Anna Morley PA  Authorized by: Mehdi Rosa MD     Consent:     Consent obtained:  Verbal    Consent given by:  Patient    Risks, benefits, and alternatives were discussed: yes      Risks discussed:  Discoloration, numbness, pain and swelling    Alternatives discussed:  Referral, observation, alternative treatment, delayed treatment and no treatment  Universal protocol:     Imaging studies available: yes      Patient identity confirmed:  Verbally with patient and arm band  Pre-procedure details:     Distal neurologic exam:  Normal    Distal perfusion: distal pulses strong    Procedure details:     Location: Back.    Supplies:  Prefabricated splint    Attestation: Splint applied and adjusted personally by me    Post-procedure details:     Distal neurologic exam:  Normal    Distal perfusion: distal pulses strong      Procedure completion:  Tolerated    Post-procedure imaging: not applicable                 ED Course  ED Course as of 09/08/22 2102   Thu Sep 08, 2022   1843 CT Head Without Contrast  IMPRESSION:  1. No acute osseous abnormality.  2. Diffuse chronic changes as noted above.     Signer Name: Alexandre Luna MD   Signed: 9/8/2022 6:35 PM   [facility]    [facility] [MM]   1842 CT Cervical Spine Without  Contrast  IMPRESSION:  1.  No acute osseous abnormality.  2.  Multilevel degenerative changes as noted above.     Signer Name: Alexandre Luna MD   Signed: 9/8/2022 6:41 PM   Workstation Name: [facility] [MM]   1849 CT Thoracic Spine Without Contrast  IMPRESSION:  1. No acute osseous abnormality involving the thoracic spine.  2. Bony demineralization suggesting osteoporosis.  3. See lumbar CT to follow.     Signer Name: Alexandre Luna MD   Signed: 9/8/2022 6:44 PM   Workstation Name: [facility] [MM]   1859 CT Lumbar Spine Without Contrast  IMPRESSION:  1.  Moderate acute superior endplate vertebral compression fracture deformity at L1 tilting and about 40% loss of vertebral body height. Mild posterior osseous retropulsion of about 6 mm.  2.  Chronic compression fracture deformities involving the inferior endplates of L3 and L4 as detailed above.  3.  Multilevel degenerative changes. Demineralization.     Signer Name: Alexandre Luna MD   Signed: 9/8/2022 6:54 PM   Workstation Name: [facility] [MM]   1859 EKG noted ventricular paced rhythm.  77 bpm.  No acute ST elevation.  QTc 538 [SF]   1859 CT Pelvis Without Contrast  IMPRESSION:  1.  No acute osseous abnormality involving the pelvis, sacrum or hips.  2.  Severe sigmoid colonic diverticulosis.     Signer Name: Alexandre Luna MD   Signed: 9/8/2022 6:56 PM   Workstation Name: [facility] [MM]   1930 XR Chest 1 View  IMPRESSION:  1. No active disease.  2. Hiatal hernia.     Signer Name: Alexandre Luna MD   Signed: 9/8/2022 7:10 PM   Workstation Name: [facility] [MM]   2027 Patient takes Norco 7.5 mg BID for chronic pain but has been taking extra because of her back pain from this fall 2 weeks ago so she is out and isn't due for a refill until 1  week from now. Discussed her case with Dr. Rosa who is comfortable giving her a 3 day supply of norco and having her f/u with Dr. Krishna. She will be d/c home in back brace. [MM]      ED Course User Index  [MM] Anna Morley PA  [SF] Jose Luis Prakash,                                            MDM  Number of Diagnoses or Management Options     Amount and/or Complexity of Data Reviewed  Clinical lab tests: ordered and reviewed  Tests in the radiology section of CPT®: ordered and reviewed  Decide to obtain previous medical records or to obtain history from someone other than the patient: yes  Discuss the patient with other providers: yes        Final diagnoses:   Compression fracture of L1 vertebra, initial encounter (HCC)       ED Disposition  ED Disposition     ED Disposition   Discharge    Condition   Stable    Comment   --             Leno Krishna DO  [address] [place] [address] [phone]    In 2 days           Medication List      Changed    * HYDROcodone-acetaminophen 7.5-325 MG per tablet  Commonly known as: NORCO  What changed: Another medication with the same name was added. Make sure you understand how and when to take each.     * HYDROcodone-acetaminophen 7.5-325 MG per tablet  Commonly known as: NORCO  Take 1 tablet by mouth Every 6 (Six) Hours As Needed for Moderate Pain for up to 3 days.  What changed: You were already taking a medication with the same name, and this prescription was added. Make sure you understand how and when to take each.         * This list has 2 medication(s) that are the same as other medications prescribed for you. Read the directions carefully, and ask your doctor or other care provider to review them with you.               Where to Get Your Medications      These medications were sent to [place], [place] - [address] [phone]  - [phone]   [address] [place] [address]    Phone: [phone]    · HYDROcodone-acetaminophen 7.5-325 MG per tablet          Anna Morley PA  09/08/22 3100

## 2022-09-09 NOTE — DISCHARGE INSTRUCTIONS
Please wear your brace and utilize your pain medication. Please follow up with Dr. Krishna or return to ER if symptoms worsen.

## 2022-09-12 LAB
QT INTERVAL: 476 MS
QTC INTERVAL: 538 MS

## 2022-09-19 ENCOUNTER — APPOINTMENT (OUTPATIENT)
Dept: GENERAL RADIOLOGY | Facility: HOSPITAL | Age: 87
End: 2022-09-19

## 2022-09-19 ENCOUNTER — APPOINTMENT (OUTPATIENT)
Dept: CT IMAGING | Facility: HOSPITAL | Age: 87
End: 2022-09-19

## 2022-09-19 ENCOUNTER — HOSPITAL ENCOUNTER (INPATIENT)
Facility: HOSPITAL | Age: 87
LOS: 16 days | Discharge: NURSING, REHAB OR LONG-TERM CARE | End: 2022-10-05
Attending: EMERGENCY MEDICINE | Admitting: INTERNAL MEDICINE

## 2022-09-19 DIAGNOSIS — N39.0 ACUTE UTI: ICD-10-CM

## 2022-09-19 DIAGNOSIS — R53.1 WEAKNESS: ICD-10-CM

## 2022-09-19 DIAGNOSIS — D64.9 ANEMIA, UNSPECIFIED TYPE: Primary | ICD-10-CM

## 2022-09-19 PROBLEM — I50.9 ACUTE CHF: Status: RESOLVED | Noted: 2017-10-09 | Resolved: 2022-09-19

## 2022-09-19 LAB
ABO GROUP BLD: NORMAL
ALBUMIN SERPL-MCNC: 3.43 G/DL (ref 3.5–5.2)
ALBUMIN/GLOB SERPL: 1.2 G/DL
ALP SERPL-CCNC: 77 U/L (ref 39–117)
ALT SERPL W P-5'-P-CCNC: 8 U/L (ref 1–33)
ANION GAP SERPL CALCULATED.3IONS-SCNC: 20 MMOL/L (ref 5–15)
AST SERPL-CCNC: 19 U/L (ref 1–32)
BACTERIA UR QL AUTO: ABNORMAL /HPF
BASOPHILS # BLD AUTO: 0.03 10*3/MM3 (ref 0–0.2)
BASOPHILS NFR BLD AUTO: 0.2 % (ref 0–1.5)
BILIRUB SERPL-MCNC: 0.9 MG/DL (ref 0–1.2)
BILIRUB UR QL STRIP: NEGATIVE
BLD GP AB SCN SERPL QL: NEGATIVE
BUN SERPL-MCNC: 91 MG/DL (ref 8–23)
BUN/CREAT SERPL: 14.9 (ref 7–25)
CALCIUM SPEC-SCNC: 9 MG/DL (ref 8.6–10.5)
CHLORIDE SERPL-SCNC: 93 MMOL/L (ref 98–107)
CLARITY UR: ABNORMAL
CO2 SERPL-SCNC: 17 MMOL/L (ref 22–29)
COLOR UR: ABNORMAL
CREAT SERPL-MCNC: 6.11 MG/DL (ref 0.57–1)
D-LACTATE SERPL-SCNC: 1.3 MMOL/L (ref 0.5–2)
D-LACTATE SERPL-SCNC: 2.3 MMOL/L (ref 0.5–2)
DEPRECATED RDW RBC AUTO: 55.5 FL (ref 37–54)
EGFRCR SERPLBLD CKD-EPI 2021: 6.1 ML/MIN/1.73
EOSINOPHIL # BLD AUTO: 0 10*3/MM3 (ref 0–0.4)
EOSINOPHIL NFR BLD AUTO: 0 % (ref 0.3–6.2)
ERYTHROCYTE [DISTWIDTH] IN BLOOD BY AUTOMATED COUNT: 17 % (ref 12.3–15.4)
FLUAV SUBTYP SPEC NAA+PROBE: NOT DETECTED
FLUBV RNA ISLT QL NAA+PROBE: NOT DETECTED
GLOBULIN UR ELPH-MCNC: 2.8 GM/DL
GLUCOSE SERPL-MCNC: 137 MG/DL (ref 65–99)
GLUCOSE UR STRIP-MCNC: NEGATIVE MG/DL
HCT VFR BLD AUTO: 19.6 % (ref 34–46.6)
HGB BLD-MCNC: 6.3 G/DL (ref 12–15.9)
HGB UR QL STRIP.AUTO: NEGATIVE
HYALINE CASTS UR QL AUTO: ABNORMAL /LPF
IMM GRANULOCYTES # BLD AUTO: 0.18 10*3/MM3 (ref 0–0.05)
IMM GRANULOCYTES NFR BLD AUTO: 1.1 % (ref 0–0.5)
KETONES UR QL STRIP: NEGATIVE
LEUKOCYTE ESTERASE UR QL STRIP.AUTO: ABNORMAL
LYMPHOCYTES # BLD AUTO: 0.34 10*3/MM3 (ref 0.7–3.1)
LYMPHOCYTES NFR BLD AUTO: 2.1 % (ref 19.6–45.3)
MCH RBC QN AUTO: 29.2 PG (ref 26.6–33)
MCHC RBC AUTO-ENTMCNC: 32.1 G/DL (ref 31.5–35.7)
MCV RBC AUTO: 90.7 FL (ref 79–97)
MONOCYTES # BLD AUTO: 0.67 10*3/MM3 (ref 0.1–0.9)
MONOCYTES NFR BLD AUTO: 4.2 % (ref 5–12)
NEUTROPHILS NFR BLD AUTO: 14.65 10*3/MM3 (ref 1.7–7)
NEUTROPHILS NFR BLD AUTO: 92.4 % (ref 42.7–76)
NITRITE UR QL STRIP: NEGATIVE
NRBC BLD AUTO-RTO: 0.2 /100 WBC (ref 0–0.2)
NT-PROBNP SERPL-MCNC: ABNORMAL PG/ML (ref 0–1800)
PH UR STRIP.AUTO: <=5 [PH] (ref 5–8)
PLATELET # BLD AUTO: 250 10*3/MM3 (ref 140–450)
PMV BLD AUTO: 10.1 FL (ref 6–12)
POTASSIUM SERPL-SCNC: 5.5 MMOL/L (ref 3.5–5.2)
PROT SERPL-MCNC: 6.2 G/DL (ref 6–8.5)
PROT UR QL STRIP: NEGATIVE
RBC # BLD AUTO: 2.16 10*6/MM3 (ref 3.77–5.28)
RBC # UR STRIP: ABNORMAL /HPF
REF LAB TEST METHOD: ABNORMAL
RH BLD: NEGATIVE
SARS-COV-2 RNA PNL SPEC NAA+PROBE: NOT DETECTED
SODIUM SERPL-SCNC: 130 MMOL/L (ref 136–145)
SP GR UR STRIP: 1.02 (ref 1–1.03)
SQUAMOUS #/AREA URNS HPF: ABNORMAL /HPF
T&S EXPIRATION DATE: NORMAL
TROPONIN T SERPL-MCNC: 0.04 NG/ML (ref 0–0.03)
UROBILINOGEN UR QL STRIP: ABNORMAL
WBC # UR STRIP: ABNORMAL /HPF
WBC NRBC COR # BLD: 15.87 10*3/MM3 (ref 3.4–10.8)

## 2022-09-19 PROCEDURE — 99285 EMERGENCY DEPT VISIT HI MDM: CPT

## 2022-09-19 PROCEDURE — 73610 X-RAY EXAM OF ANKLE: CPT

## 2022-09-19 PROCEDURE — 82550 ASSAY OF CK (CPK): CPT | Performed by: PHYSICIAN ASSISTANT

## 2022-09-19 PROCEDURE — 87636 SARSCOV2 & INF A&B AMP PRB: CPT | Performed by: NURSE PRACTITIONER

## 2022-09-19 PROCEDURE — 85025 COMPLETE CBC W/AUTO DIFF WBC: CPT | Performed by: NURSE PRACTITIONER

## 2022-09-19 PROCEDURE — 73590 X-RAY EXAM OF LOWER LEG: CPT

## 2022-09-19 PROCEDURE — 73630 X-RAY EXAM OF FOOT: CPT

## 2022-09-19 PROCEDURE — 93005 ELECTROCARDIOGRAM TRACING: CPT | Performed by: NURSE PRACTITIONER

## 2022-09-19 PROCEDURE — 83880 ASSAY OF NATRIURETIC PEPTIDE: CPT | Performed by: NURSE PRACTITIONER

## 2022-09-19 PROCEDURE — 36430 TRANSFUSION BLD/BLD COMPNT: CPT

## 2022-09-19 PROCEDURE — 74176 CT ABD & PELVIS W/O CONTRAST: CPT

## 2022-09-19 PROCEDURE — 86901 BLOOD TYPING SEROLOGIC RH(D): CPT | Performed by: NURSE PRACTITIONER

## 2022-09-19 PROCEDURE — 80053 COMPREHEN METABOLIC PANEL: CPT | Performed by: NURSE PRACTITIONER

## 2022-09-19 PROCEDURE — 63710000001 ONDANSETRON ODT 4 MG TABLET DISPERSIBLE: Performed by: NURSE PRACTITIONER

## 2022-09-19 PROCEDURE — 86900 BLOOD TYPING SEROLOGIC ABO: CPT | Performed by: NURSE PRACTITIONER

## 2022-09-19 PROCEDURE — 73590 X-RAY EXAM OF LOWER LEG: CPT | Performed by: RADIOLOGY

## 2022-09-19 PROCEDURE — 36415 COLL VENOUS BLD VENIPUNCTURE: CPT

## 2022-09-19 PROCEDURE — 84484 ASSAY OF TROPONIN QUANT: CPT | Performed by: PHYSICIAN ASSISTANT

## 2022-09-19 PROCEDURE — P9016 RBC LEUKOCYTES REDUCED: HCPCS

## 2022-09-19 PROCEDURE — 85730 THROMBOPLASTIN TIME PARTIAL: CPT | Performed by: PHYSICIAN ASSISTANT

## 2022-09-19 PROCEDURE — 86900 BLOOD TYPING SEROLOGIC ABO: CPT

## 2022-09-19 PROCEDURE — 99223 1ST HOSP IP/OBS HIGH 75: CPT | Performed by: INTERNAL MEDICINE

## 2022-09-19 PROCEDURE — 73502 X-RAY EXAM HIP UNI 2-3 VIEWS: CPT

## 2022-09-19 PROCEDURE — 73502 X-RAY EXAM HIP UNI 2-3 VIEWS: CPT | Performed by: RADIOLOGY

## 2022-09-19 PROCEDURE — 86923 COMPATIBILITY TEST ELECTRIC: CPT

## 2022-09-19 PROCEDURE — 83605 ASSAY OF LACTIC ACID: CPT | Performed by: NURSE PRACTITIONER

## 2022-09-19 PROCEDURE — 73610 X-RAY EXAM OF ANKLE: CPT | Performed by: RADIOLOGY

## 2022-09-19 PROCEDURE — 73630 X-RAY EXAM OF FOOT: CPT | Performed by: RADIOLOGY

## 2022-09-19 PROCEDURE — 87086 URINE CULTURE/COLONY COUNT: CPT | Performed by: NURSE PRACTITIONER

## 2022-09-19 PROCEDURE — 25010000002 CEFTRIAXONE PER 250 MG: Performed by: NURSE PRACTITIONER

## 2022-09-19 PROCEDURE — 73552 X-RAY EXAM OF FEMUR 2/>: CPT | Performed by: RADIOLOGY

## 2022-09-19 PROCEDURE — 86850 RBC ANTIBODY SCREEN: CPT | Performed by: NURSE PRACTITIONER

## 2022-09-19 PROCEDURE — 87040 BLOOD CULTURE FOR BACTERIA: CPT | Performed by: NURSE PRACTITIONER

## 2022-09-19 PROCEDURE — 73700 CT LOWER EXTREMITY W/O DYE: CPT | Performed by: RADIOLOGY

## 2022-09-19 PROCEDURE — 81001 URINALYSIS AUTO W/SCOPE: CPT | Performed by: NURSE PRACTITIONER

## 2022-09-19 PROCEDURE — 73700 CT LOWER EXTREMITY W/O DYE: CPT

## 2022-09-19 PROCEDURE — 85610 PROTHROMBIN TIME: CPT | Performed by: PHYSICIAN ASSISTANT

## 2022-09-19 PROCEDURE — 73552 X-RAY EXAM OF FEMUR 2/>: CPT

## 2022-09-19 PROCEDURE — 93010 ELECTROCARDIOGRAM REPORT: CPT | Performed by: INTERNAL MEDICINE

## 2022-09-19 RX ORDER — HYDROCODONE BITARTRATE AND ACETAMINOPHEN 5; 325 MG/1; MG/1
1 TABLET ORAL ONCE
Status: COMPLETED | OUTPATIENT
Start: 2022-09-19 | End: 2022-09-19

## 2022-09-19 RX ORDER — ESCITALOPRAM OXALATE 10 MG/1
10 TABLET ORAL DAILY
COMMUNITY

## 2022-09-19 RX ORDER — ASPIRIN 81 MG/1
81 TABLET ORAL DAILY
COMMUNITY
End: 2023-01-13 | Stop reason: HOSPADM

## 2022-09-19 RX ORDER — FUROSEMIDE 40 MG/1
40 TABLET ORAL DAILY
COMMUNITY
End: 2023-01-13 | Stop reason: HOSPADM

## 2022-09-19 RX ORDER — ALPRAZOLAM 1 MG/1
1 TABLET ORAL 2 TIMES DAILY PRN
COMMUNITY
End: 2023-01-13 | Stop reason: HOSPADM

## 2022-09-19 RX ORDER — ONDANSETRON 4 MG/1
4 TABLET, ORALLY DISINTEGRATING ORAL ONCE
Status: COMPLETED | OUTPATIENT
Start: 2022-09-19 | End: 2022-09-19

## 2022-09-19 RX ORDER — SODIUM CHLORIDE 0.9 % (FLUSH) 0.9 %
10 SYRINGE (ML) INJECTION AS NEEDED
Status: DISCONTINUED | OUTPATIENT
Start: 2022-09-19 | End: 2022-10-05 | Stop reason: HOSPADM

## 2022-09-19 RX ORDER — WARFARIN SODIUM 6 MG/1
6 TABLET ORAL
COMMUNITY
End: 2023-01-13 | Stop reason: HOSPADM

## 2022-09-19 RX ADMIN — CEFTRIAXONE 1 G: 1 INJECTION, POWDER, FOR SOLUTION INTRAMUSCULAR; INTRAVENOUS at 21:17

## 2022-09-19 RX ADMIN — SODIUM CHLORIDE 500 ML: 9 INJECTION, SOLUTION INTRAVENOUS at 17:20

## 2022-09-19 RX ADMIN — ONDANSETRON 4 MG: 4 TABLET, ORALLY DISINTEGRATING ORAL at 11:35

## 2022-09-19 RX ADMIN — HYDROCODONE BITARTRATE AND ACETAMINOPHEN 1 TABLET: 5; 325 TABLET ORAL at 22:44

## 2022-09-20 LAB
ALBUMIN SERPL-MCNC: 3.19 G/DL (ref 3.5–5.2)
ALBUMIN/GLOB SERPL: 1.4 G/DL
ALP SERPL-CCNC: 64 U/L (ref 39–117)
ALT SERPL W P-5'-P-CCNC: 8 U/L (ref 1–33)
ANION GAP SERPL CALCULATED.3IONS-SCNC: 17.9 MMOL/L (ref 5–15)
APTT PPP: >100 SECONDS (ref 26.5–34.5)
AST SERPL-CCNC: 17 U/L (ref 1–32)
BACTERIA SPEC AEROBE CULT: NORMAL
BASOPHILS # BLD AUTO: 0.02 10*3/MM3 (ref 0–0.2)
BASOPHILS NFR BLD AUTO: 0.2 % (ref 0–1.5)
BH BB BLOOD EXPIRATION DATE: NORMAL
BH BB BLOOD EXPIRATION DATE: NORMAL
BH BB BLOOD TYPE BARCODE: 1700
BH BB BLOOD TYPE BARCODE: 1700
BH BB DISPENSE STATUS: NORMAL
BH BB DISPENSE STATUS: NORMAL
BH BB PRODUCT CODE: NORMAL
BH BB PRODUCT CODE: NORMAL
BH BB UNIT NUMBER: NORMAL
BH BB UNIT NUMBER: NORMAL
BILIRUB SERPL-MCNC: 1 MG/DL (ref 0–1.2)
BUN SERPL-MCNC: 92 MG/DL (ref 8–23)
BUN/CREAT SERPL: 15.8 (ref 7–25)
CALCIUM SPEC-SCNC: 8.5 MG/DL (ref 8.6–10.5)
CHLORIDE SERPL-SCNC: 96 MMOL/L (ref 98–107)
CHLORIDE UR-SCNC: <20 MMOL/L
CK SERPL-CCNC: 348 U/L (ref 20–180)
CO2 SERPL-SCNC: 16.1 MMOL/L (ref 22–29)
CREAT SERPL-MCNC: 5.84 MG/DL (ref 0.57–1)
CROSSMATCH INTERPRETATION: NORMAL
CROSSMATCH INTERPRETATION: NORMAL
DEPRECATED RDW RBC AUTO: 60.9 FL (ref 37–54)
EGFRCR SERPLBLD CKD-EPI 2021: 6.5 ML/MIN/1.73
EOSINOPHIL # BLD AUTO: 0 10*3/MM3 (ref 0–0.4)
EOSINOPHIL NFR BLD AUTO: 0 % (ref 0.3–6.2)
ERYTHROCYTE [DISTWIDTH] IN BLOOD BY AUTOMATED COUNT: 19.5 % (ref 12.3–15.4)
FOLATE SERPL-MCNC: 6.1 NG/ML (ref 4.78–24.2)
GLOBULIN UR ELPH-MCNC: 2.2 GM/DL
GLUCOSE BLDC GLUCOMTR-MCNC: 108 MG/DL (ref 70–130)
GLUCOSE SERPL-MCNC: 100 MG/DL (ref 65–99)
HCT VFR BLD AUTO: 23.8 % (ref 34–46.6)
HGB BLD-MCNC: 7.8 G/DL (ref 12–15.9)
IMM GRANULOCYTES # BLD AUTO: 0.09 10*3/MM3 (ref 0–0.05)
IMM GRANULOCYTES NFR BLD AUTO: 0.8 % (ref 0–0.5)
INR PPP: 1.02 (ref 0.9–1.1)
INR PPP: >10 (ref 0.9–1.1)
IRON 24H UR-MRATE: 66 MCG/DL (ref 37–145)
IRON SATN MFR SERPL: 21 % (ref 20–50)
LYMPHOCYTES # BLD AUTO: 0.74 10*3/MM3 (ref 0.7–3.1)
LYMPHOCYTES NFR BLD AUTO: 6.5 % (ref 19.6–45.3)
MAGNESIUM SERPL-MCNC: 2.5 MG/DL (ref 1.6–2.4)
MCH RBC QN AUTO: 28.7 PG (ref 26.6–33)
MCHC RBC AUTO-ENTMCNC: 32.8 G/DL (ref 31.5–35.7)
MCV RBC AUTO: 87.5 FL (ref 79–97)
MONOCYTES # BLD AUTO: 1.03 10*3/MM3 (ref 0.1–0.9)
MONOCYTES NFR BLD AUTO: 9.1 % (ref 5–12)
NEUTROPHILS NFR BLD AUTO: 83.4 % (ref 42.7–76)
NEUTROPHILS NFR BLD AUTO: 9.45 10*3/MM3 (ref 1.7–7)
NRBC BLD AUTO-RTO: 0.4 /100 WBC (ref 0–0.2)
PLATELET # BLD AUTO: 180 10*3/MM3 (ref 140–450)
PMV BLD AUTO: 10 FL (ref 6–12)
POTASSIUM SERPL-SCNC: 4.8 MMOL/L (ref 3.5–5.2)
POTASSIUM UR-SCNC: 61.1 MMOL/L
PROT SERPL-MCNC: 5.4 G/DL (ref 6–8.5)
PROTHROMBIN TIME: 13.6 SECONDS (ref 12.1–14.7)
PROTHROMBIN TIME: >100 SECONDS (ref 12.1–14.7)
RBC # BLD AUTO: 2.72 10*6/MM3 (ref 3.77–5.28)
SODIUM SERPL-SCNC: 130 MMOL/L (ref 136–145)
SODIUM UR-SCNC: <20 MMOL/L
TIBC SERPL-MCNC: 308 MCG/DL (ref 298–536)
TRANSFERRIN SERPL-MCNC: 207 MG/DL (ref 200–360)
TROPONIN T SERPL-MCNC: 0.04 NG/ML (ref 0–0.03)
TROPONIN T SERPL-MCNC: 0.06 NG/ML (ref 0–0.03)
UNIT  ABO: NORMAL
UNIT  ABO: NORMAL
UNIT  RH: NORMAL
UNIT  RH: NORMAL
VIT B12 BLD-MCNC: 536 PG/ML (ref 211–946)
WBC NRBC COR # BLD: 11.33 10*3/MM3 (ref 3.4–10.8)

## 2022-09-20 PROCEDURE — 83735 ASSAY OF MAGNESIUM: CPT | Performed by: INTERNAL MEDICINE

## 2022-09-20 PROCEDURE — 99232 SBSQ HOSP IP/OBS MODERATE 35: CPT | Performed by: SURGERY

## 2022-09-20 PROCEDURE — 82962 GLUCOSE BLOOD TEST: CPT

## 2022-09-20 PROCEDURE — 0 PHYTONADIONE 10 MG/ML SOLUTION 1 ML AMPULE: Performed by: PHYSICIAN ASSISTANT

## 2022-09-20 PROCEDURE — 84300 ASSAY OF URINE SODIUM: CPT | Performed by: PHYSICIAN ASSISTANT

## 2022-09-20 PROCEDURE — 85025 COMPLETE CBC W/AUTO DIFF WBC: CPT | Performed by: INTERNAL MEDICINE

## 2022-09-20 PROCEDURE — 84133 ASSAY OF URINE POTASSIUM: CPT | Performed by: PHYSICIAN ASSISTANT

## 2022-09-20 PROCEDURE — 97167 OT EVAL HIGH COMPLEX 60 MIN: CPT

## 2022-09-20 PROCEDURE — 82570 ASSAY OF URINE CREATININE: CPT | Performed by: PHYSICIAN ASSISTANT

## 2022-09-20 PROCEDURE — 25010000002 PROTHROMBIN COMPLEX CONC HUMAN 1000 UNITS KIT: Performed by: PHYSICIAN ASSISTANT

## 2022-09-20 PROCEDURE — 82436 ASSAY OF URINE CHLORIDE: CPT | Performed by: PHYSICIAN ASSISTANT

## 2022-09-20 PROCEDURE — 82746 ASSAY OF FOLIC ACID SERUM: CPT | Performed by: PHYSICIAN ASSISTANT

## 2022-09-20 PROCEDURE — 82607 VITAMIN B-12: CPT | Performed by: PHYSICIAN ASSISTANT

## 2022-09-20 PROCEDURE — 84466 ASSAY OF TRANSFERRIN: CPT | Performed by: PHYSICIAN ASSISTANT

## 2022-09-20 PROCEDURE — 25010000002 CEFTRIAXONE PER 250 MG: Performed by: PHYSICIAN ASSISTANT

## 2022-09-20 PROCEDURE — 84156 ASSAY OF PROTEIN URINE: CPT | Performed by: PHYSICIAN ASSISTANT

## 2022-09-20 PROCEDURE — 84484 ASSAY OF TROPONIN QUANT: CPT | Performed by: INTERNAL MEDICINE

## 2022-09-20 PROCEDURE — 92610 EVALUATE SWALLOWING FUNCTION: CPT

## 2022-09-20 PROCEDURE — 84484 ASSAY OF TROPONIN QUANT: CPT | Performed by: PHYSICIAN ASSISTANT

## 2022-09-20 PROCEDURE — 80053 COMPREHEN METABOLIC PANEL: CPT | Performed by: INTERNAL MEDICINE

## 2022-09-20 PROCEDURE — 94761 N-INVAS EAR/PLS OXIMETRY MLT: CPT

## 2022-09-20 PROCEDURE — 83540 ASSAY OF IRON: CPT | Performed by: PHYSICIAN ASSISTANT

## 2022-09-20 PROCEDURE — 85610 PROTHROMBIN TIME: CPT | Performed by: PHYSICIAN ASSISTANT

## 2022-09-20 PROCEDURE — 97163 PT EVAL HIGH COMPLEX 45 MIN: CPT

## 2022-09-20 RX ORDER — ESCITALOPRAM OXALATE 10 MG/1
10 TABLET ORAL DAILY
Status: CANCELLED | OUTPATIENT
Start: 2022-09-20

## 2022-09-20 RX ORDER — FUROSEMIDE 40 MG/1
40 TABLET ORAL DAILY
Status: CANCELLED | OUTPATIENT
Start: 2022-09-20

## 2022-09-20 RX ORDER — POTASSIUM CHLORIDE 750 MG/1
10 TABLET, FILM COATED, EXTENDED RELEASE ORAL 2 TIMES DAILY WITH MEALS
Status: CANCELLED | OUTPATIENT
Start: 2022-09-20

## 2022-09-20 RX ORDER — POLYETHYLENE GLYCOL 3350 17 G/17G
17 POWDER, FOR SOLUTION ORAL 2 TIMES DAILY
Status: DISCONTINUED | OUTPATIENT
Start: 2022-09-20 | End: 2022-09-30

## 2022-09-20 RX ORDER — GABAPENTIN 400 MG/1
400 CAPSULE ORAL NIGHTLY
Status: CANCELLED | OUTPATIENT
Start: 2022-09-20

## 2022-09-20 RX ORDER — ESCITALOPRAM OXALATE 10 MG/1
10 TABLET ORAL DAILY
Status: DISCONTINUED | OUTPATIENT
Start: 2022-09-20 | End: 2022-10-05 | Stop reason: HOSPADM

## 2022-09-20 RX ORDER — ISOSORBIDE MONONITRATE 60 MG/1
60 TABLET, EXTENDED RELEASE ORAL DAILY
Status: CANCELLED | OUTPATIENT
Start: 2022-09-20

## 2022-09-20 RX ORDER — ALPRAZOLAM 0.5 MG/1
1 TABLET ORAL 2 TIMES DAILY PRN
Status: DISCONTINUED | OUTPATIENT
Start: 2022-09-20 | End: 2022-09-30

## 2022-09-20 RX ORDER — GABAPENTIN 300 MG/1
300 CAPSULE ORAL NIGHTLY
Status: DISCONTINUED | OUTPATIENT
Start: 2022-09-20 | End: 2022-10-05 | Stop reason: HOSPADM

## 2022-09-20 RX ORDER — SODIUM CHLORIDE 0.9 % (FLUSH) 0.9 %
10 SYRINGE (ML) INJECTION EVERY 12 HOURS SCHEDULED
Status: DISCONTINUED | OUTPATIENT
Start: 2022-09-20 | End: 2022-10-05 | Stop reason: HOSPADM

## 2022-09-20 RX ORDER — HYDROCODONE BITARTRATE AND ACETAMINOPHEN 7.5; 325 MG/1; MG/1
1 TABLET ORAL EVERY 8 HOURS PRN
Status: CANCELLED | OUTPATIENT
Start: 2022-09-20

## 2022-09-20 RX ORDER — PANTOPRAZOLE SODIUM 40 MG/1
40 TABLET, DELAYED RELEASE ORAL DAILY
Status: CANCELLED | OUTPATIENT
Start: 2022-09-20

## 2022-09-20 RX ORDER — HYDROCODONE BITARTRATE AND ACETAMINOPHEN 7.5; 325 MG/1; MG/1
1 TABLET ORAL EVERY 8 HOURS PRN
Status: DISCONTINUED | OUTPATIENT
Start: 2022-09-20 | End: 2022-10-05 | Stop reason: HOSPADM

## 2022-09-20 RX ORDER — HYDRALAZINE HYDROCHLORIDE 50 MG/1
100 TABLET, FILM COATED ORAL EVERY 8 HOURS SCHEDULED
Status: CANCELLED | OUTPATIENT
Start: 2022-09-20

## 2022-09-20 RX ORDER — SODIUM CHLORIDE 0.9 % (FLUSH) 0.9 %
10 SYRINGE (ML) INJECTION AS NEEDED
Status: DISCONTINUED | OUTPATIENT
Start: 2022-09-20 | End: 2022-10-05 | Stop reason: HOSPADM

## 2022-09-20 RX ORDER — ASPIRIN 81 MG/1
81 TABLET ORAL DAILY
Status: CANCELLED | OUTPATIENT
Start: 2022-09-20

## 2022-09-20 RX ORDER — METOPROLOL TARTRATE 50 MG/1
50 TABLET, FILM COATED ORAL EVERY 12 HOURS SCHEDULED
Status: CANCELLED | OUTPATIENT
Start: 2022-09-20

## 2022-09-20 RX ORDER — ALPRAZOLAM 1 MG/1
1 TABLET ORAL 2 TIMES DAILY PRN
Status: CANCELLED | OUTPATIENT
Start: 2022-09-20

## 2022-09-20 RX ADMIN — POLYETHYLENE GLYCOL (3350) 17 G: 17 POWDER, FOR SOLUTION ORAL at 20:15

## 2022-09-20 RX ADMIN — SODIUM BICARBONATE 75 MEQ: 84 INJECTION INTRAVENOUS at 11:06

## 2022-09-20 RX ADMIN — Medication 3630 UNITS: at 01:50

## 2022-09-20 RX ADMIN — Medication 10 ML: at 08:35

## 2022-09-20 RX ADMIN — SODIUM BICARBONATE 75 MEQ: 84 INJECTION INTRAVENOUS at 23:24

## 2022-09-20 RX ADMIN — PHYTONADIONE 10 MG: 10 INJECTION, EMULSION INTRAMUSCULAR; INTRAVENOUS; SUBCUTANEOUS at 01:00

## 2022-09-20 RX ADMIN — ESCITALOPRAM 10 MG: 10 TABLET, FILM COATED ORAL at 08:35

## 2022-09-20 RX ADMIN — Medication 10 ML: at 20:15

## 2022-09-20 RX ADMIN — CEFTRIAXONE 1 G: 1 INJECTION, POWDER, FOR SOLUTION INTRAMUSCULAR; INTRAVENOUS at 20:15

## 2022-09-21 ENCOUNTER — APPOINTMENT (OUTPATIENT)
Dept: CARDIOLOGY | Facility: HOSPITAL | Age: 87
End: 2022-09-21

## 2022-09-21 ENCOUNTER — APPOINTMENT (OUTPATIENT)
Dept: GENERAL RADIOLOGY | Facility: HOSPITAL | Age: 87
End: 2022-09-21

## 2022-09-21 LAB
ANION GAP SERPL CALCULATED.3IONS-SCNC: 15 MMOL/L (ref 5–15)
BASOPHILS # BLD AUTO: 0.02 10*3/MM3 (ref 0–0.2)
BASOPHILS NFR BLD AUTO: 0.3 % (ref 0–1.5)
BH CV ECHO MEAS - ACS: 1.6 CM
BH CV ECHO MEAS - AO MAX PG: 11.3 MMHG
BH CV ECHO MEAS - AO MEAN PG: 6 MMHG
BH CV ECHO MEAS - AO ROOT DIAM: 2.9 CM
BH CV ECHO MEAS - AO V2 MAX: 168 CM/SEC
BH CV ECHO MEAS - AO V2 VTI: 28.7 CM
BH CV ECHO MEAS - EDV(CUBED): 85.8 ML
BH CV ECHO MEAS - EDV(MOD-SP4): 65.9 ML
BH CV ECHO MEAS - EF(MOD-SP4): 66.3 %
BH CV ECHO MEAS - ESV(CUBED): 29.1 ML
BH CV ECHO MEAS - ESV(MOD-SP4): 22.2 ML
BH CV ECHO MEAS - FS: 30.3 %
BH CV ECHO MEAS - IVS/LVPW: 0.88 CM
BH CV ECHO MEAS - IVSD: 1.22 CM
BH CV ECHO MEAS - LA DIMENSION: 5.3 CM
BH CV ECHO MEAS - LAT PEAK E' VEL: 11.1 CM/SEC
BH CV ECHO MEAS - LV DIASTOLIC VOL/BSA (35-75): 35.4 CM2
BH CV ECHO MEAS - LV MASS(C)D: 215.2 GRAMS
BH CV ECHO MEAS - LV SYSTOLIC VOL/BSA (12-30): 11.9 CM2
BH CV ECHO MEAS - LVIDD: 4.4 CM
BH CV ECHO MEAS - LVIDS: 3.1 CM
BH CV ECHO MEAS - LVOT AREA: 2.01 CM2
BH CV ECHO MEAS - LVOT DIAM: 1.6 CM
BH CV ECHO MEAS - LVPWD: 1.38 CM
BH CV ECHO MEAS - MED PEAK E' VEL: 5.2 CM/SEC
BH CV ECHO MEAS - MV A MAX VEL: 47.6 CM/SEC
BH CV ECHO MEAS - MV E MAX VEL: 80.1 CM/SEC
BH CV ECHO MEAS - MV E/A: 1.68
BH CV ECHO MEAS - PA ACC TIME: 0.04 SEC
BH CV ECHO MEAS - PA PR(ACCEL): 61.5 MMHG
BH CV ECHO MEAS - RAP SYSTOLE: 10 MMHG
BH CV ECHO MEAS - RVSP: 91.7 MMHG
BH CV ECHO MEAS - SI(MOD-SP4): 23.5 ML/M2
BH CV ECHO MEAS - SV(MOD-SP4): 43.7 ML
BH CV ECHO MEAS - TAPSE (>1.6): 1.78 CM
BH CV ECHO MEAS - TR MAX PG: 81.7 MMHG
BH CV ECHO MEAS - TR MAX VEL: 452 CM/SEC
BH CV ECHO MEASUREMENTS AVERAGE E/E' RATIO: 9.83
BUN SERPL-MCNC: 86 MG/DL (ref 8–23)
BUN/CREAT SERPL: 18.5 (ref 7–25)
CALCIUM SPEC-SCNC: 8.3 MG/DL (ref 8.6–10.5)
CHLORIDE SERPL-SCNC: 98 MMOL/L (ref 98–107)
CO2 SERPL-SCNC: 22 MMOL/L (ref 22–29)
CREAT SERPL-MCNC: 4.64 MG/DL (ref 0.57–1)
CREAT UR-MCNC: 110.9 MG/DL
DEPRECATED RDW RBC AUTO: 57.4 FL (ref 37–54)
EGFRCR SERPLBLD CKD-EPI 2021: 8.6 ML/MIN/1.73
EOSINOPHIL # BLD AUTO: 0.05 10*3/MM3 (ref 0–0.4)
EOSINOPHIL NFR BLD AUTO: 0.7 % (ref 0.3–6.2)
ERYTHROCYTE [DISTWIDTH] IN BLOOD BY AUTOMATED COUNT: 19 % (ref 12.3–15.4)
GLUCOSE SERPL-MCNC: 99 MG/DL (ref 65–99)
HCT VFR BLD AUTO: 19.9 % (ref 34–46.6)
HGB BLD-MCNC: 6.6 G/DL (ref 12–15.9)
IMM GRANULOCYTES # BLD AUTO: 0.05 10*3/MM3 (ref 0–0.05)
IMM GRANULOCYTES NFR BLD AUTO: 0.7 % (ref 0–0.5)
INR PPP: 1.03 (ref 0.9–1.1)
LYMPHOCYTES # BLD AUTO: 0.76 10*3/MM3 (ref 0.7–3.1)
LYMPHOCYTES NFR BLD AUTO: 10.4 % (ref 19.6–45.3)
MAXIMAL PREDICTED HEART RATE: 131 BPM
MCH RBC QN AUTO: 28.4 PG (ref 26.6–33)
MCHC RBC AUTO-ENTMCNC: 33.2 G/DL (ref 31.5–35.7)
MCV RBC AUTO: 85.8 FL (ref 79–97)
MONOCYTES # BLD AUTO: 0.69 10*3/MM3 (ref 0.1–0.9)
MONOCYTES NFR BLD AUTO: 9.4 % (ref 5–12)
NEUTROPHILS NFR BLD AUTO: 5.75 10*3/MM3 (ref 1.7–7)
NEUTROPHILS NFR BLD AUTO: 78.5 % (ref 42.7–76)
NRBC BLD AUTO-RTO: 0.3 /100 WBC (ref 0–0.2)
PLATELET # BLD AUTO: 152 10*3/MM3 (ref 140–450)
PMV BLD AUTO: 9.3 FL (ref 6–12)
POTASSIUM SERPL-SCNC: 3.9 MMOL/L (ref 3.5–5.2)
PROT ?TM UR-MCNC: 11 MG/DL
PROT/CREAT UR: 99.2 MG/G CREA (ref 0–200)
PROTHROMBIN TIME: 13.7 SECONDS (ref 12.1–14.7)
QT INTERVAL: 484 MS
QTC INTERVAL: 571 MS
RBC # BLD AUTO: 2.32 10*6/MM3 (ref 3.77–5.28)
SODIUM SERPL-SCNC: 135 MMOL/L (ref 136–145)
STRESS TARGET HR: 111 BPM
TROPONIN T SERPL-MCNC: 0.04 NG/ML (ref 0–0.03)
WBC NRBC COR # BLD: 7.32 10*3/MM3 (ref 3.4–10.8)

## 2022-09-21 PROCEDURE — 85025 COMPLETE CBC W/AUTO DIFF WBC: CPT | Performed by: INTERNAL MEDICINE

## 2022-09-21 PROCEDURE — 36430 TRANSFUSION BLD/BLD COMPNT: CPT

## 2022-09-21 PROCEDURE — 73610 X-RAY EXAM OF ANKLE: CPT

## 2022-09-21 PROCEDURE — 73610 X-RAY EXAM OF ANKLE: CPT | Performed by: RADIOLOGY

## 2022-09-21 PROCEDURE — 80048 BASIC METABOLIC PNL TOTAL CA: CPT | Performed by: INTERNAL MEDICINE

## 2022-09-21 PROCEDURE — 86900 BLOOD TYPING SEROLOGIC ABO: CPT

## 2022-09-21 PROCEDURE — 25010000002 PROMETHAZINE PER 50 MG: Performed by: NURSE PRACTITIONER

## 2022-09-21 PROCEDURE — 85610 PROTHROMBIN TIME: CPT | Performed by: PHYSICIAN ASSISTANT

## 2022-09-21 PROCEDURE — 25010000002 CEFTRIAXONE PER 250 MG: Performed by: INTERNAL MEDICINE

## 2022-09-21 PROCEDURE — 84484 ASSAY OF TROPONIN QUANT: CPT | Performed by: INTERNAL MEDICINE

## 2022-09-21 PROCEDURE — 93306 TTE W/DOPPLER COMPLETE: CPT | Performed by: INTERNAL MEDICINE

## 2022-09-21 PROCEDURE — P9016 RBC LEUKOCYTES REDUCED: HCPCS

## 2022-09-21 PROCEDURE — 93306 TTE W/DOPPLER COMPLETE: CPT

## 2022-09-21 PROCEDURE — 99233 SBSQ HOSP IP/OBS HIGH 50: CPT | Performed by: INTERNAL MEDICINE

## 2022-09-21 RX ORDER — SODIUM CHLORIDE 0.9 % (FLUSH) 0.9 %
10 SYRINGE (ML) INJECTION EVERY 12 HOURS SCHEDULED
Status: DISCONTINUED | OUTPATIENT
Start: 2022-09-21 | End: 2022-10-05 | Stop reason: HOSPADM

## 2022-09-21 RX ORDER — DOCUSATE SODIUM 100 MG/1
100 CAPSULE, LIQUID FILLED ORAL 2 TIMES DAILY
Status: DISCONTINUED | OUTPATIENT
Start: 2022-09-21 | End: 2022-10-05 | Stop reason: HOSPADM

## 2022-09-21 RX ORDER — SODIUM CHLORIDE 0.9 % (FLUSH) 0.9 %
10 SYRINGE (ML) INJECTION AS NEEDED
Status: DISCONTINUED | OUTPATIENT
Start: 2022-09-21 | End: 2022-10-05 | Stop reason: HOSPADM

## 2022-09-21 RX ADMIN — CEFTRIAXONE 1 G: 1 INJECTION, POWDER, FOR SOLUTION INTRAMUSCULAR; INTRAVENOUS at 21:27

## 2022-09-21 RX ADMIN — SODIUM BICARBONATE 75 MEQ: 84 INJECTION INTRAVENOUS at 09:44

## 2022-09-21 RX ADMIN — Medication 10 ML: at 21:28

## 2022-09-21 RX ADMIN — Medication 10 ML: at 09:43

## 2022-09-21 RX ADMIN — POLYETHYLENE GLYCOL (3350) 17 G: 17 POWDER, FOR SOLUTION ORAL at 09:43

## 2022-09-21 RX ADMIN — PHENOL 1 SPRAY: 1.4 SPRAY ORAL at 09:43

## 2022-09-21 RX ADMIN — DOCUSATE SODIUM 100 MG: 100 CAPSULE ORAL at 21:27

## 2022-09-21 RX ADMIN — PROMETHAZINE HYDROCHLORIDE 6.25 MG: 25 INJECTION INTRAMUSCULAR; INTRAVENOUS at 11:24

## 2022-09-21 RX ADMIN — ESCITALOPRAM 10 MG: 10 TABLET, FILM COATED ORAL at 09:43

## 2022-09-21 RX ADMIN — Medication 10 ML: at 21:27

## 2022-09-22 LAB
ALBUMIN SERPL-MCNC: 3.01 G/DL (ref 3.5–5.2)
ALBUMIN/GLOB SERPL: 1.4 G/DL
ALP SERPL-CCNC: 61 U/L (ref 39–117)
ALT SERPL W P-5'-P-CCNC: 9 U/L (ref 1–33)
ANION GAP SERPL CALCULATED.3IONS-SCNC: 14.4 MMOL/L (ref 5–15)
AST SERPL-CCNC: 17 U/L (ref 1–32)
BASOPHILS # BLD AUTO: 0.02 10*3/MM3 (ref 0–0.2)
BASOPHILS NFR BLD AUTO: 0.2 % (ref 0–1.5)
BH BB BLOOD EXPIRATION DATE: NORMAL
BH BB BLOOD TYPE BARCODE: 1700
BH BB DISPENSE STATUS: NORMAL
BH BB PRODUCT CODE: NORMAL
BH BB UNIT NUMBER: NORMAL
BILIRUB SERPL-MCNC: 1 MG/DL (ref 0–1.2)
BUN SERPL-MCNC: 69 MG/DL (ref 8–23)
BUN/CREAT SERPL: 20.8 (ref 7–25)
CALCIUM SPEC-SCNC: 8.3 MG/DL (ref 8.6–10.5)
CHLORIDE SERPL-SCNC: 99 MMOL/L (ref 98–107)
CO2 SERPL-SCNC: 23.6 MMOL/L (ref 22–29)
CREAT SERPL-MCNC: 3.31 MG/DL (ref 0.57–1)
CROSSMATCH INTERPRETATION: NORMAL
DEPRECATED RDW RBC AUTO: 56.7 FL (ref 37–54)
EGFRCR SERPLBLD CKD-EPI 2021: 12.8 ML/MIN/1.73
EOSINOPHIL # BLD AUTO: 0.05 10*3/MM3 (ref 0–0.4)
EOSINOPHIL NFR BLD AUTO: 0.6 % (ref 0.3–6.2)
ERYTHROCYTE [DISTWIDTH] IN BLOOD BY AUTOMATED COUNT: 18.3 % (ref 12.3–15.4)
GLOBULIN UR ELPH-MCNC: 2.2 GM/DL
GLUCOSE SERPL-MCNC: 102 MG/DL (ref 65–99)
HCT VFR BLD AUTO: 23.7 % (ref 34–46.6)
HGB BLD-MCNC: 7.6 G/DL (ref 12–15.9)
IMM GRANULOCYTES # BLD AUTO: 0.05 10*3/MM3 (ref 0–0.05)
IMM GRANULOCYTES NFR BLD AUTO: 0.6 % (ref 0–0.5)
INR PPP: 1 (ref 0.9–1.1)
LYMPHOCYTES # BLD AUTO: 0.72 10*3/MM3 (ref 0.7–3.1)
LYMPHOCYTES NFR BLD AUTO: 8.8 % (ref 19.6–45.3)
MCH RBC QN AUTO: 28.1 PG (ref 26.6–33)
MCHC RBC AUTO-ENTMCNC: 32.1 G/DL (ref 31.5–35.7)
MCV RBC AUTO: 87.8 FL (ref 79–97)
MONOCYTES # BLD AUTO: 0.85 10*3/MM3 (ref 0.1–0.9)
MONOCYTES NFR BLD AUTO: 10.4 % (ref 5–12)
NEUTROPHILS NFR BLD AUTO: 6.48 10*3/MM3 (ref 1.7–7)
NEUTROPHILS NFR BLD AUTO: 79.4 % (ref 42.7–76)
NRBC BLD AUTO-RTO: 0.2 /100 WBC (ref 0–0.2)
PLATELET # BLD AUTO: 153 10*3/MM3 (ref 140–450)
PMV BLD AUTO: 9.7 FL (ref 6–12)
POTASSIUM SERPL-SCNC: 3.7 MMOL/L (ref 3.5–5.2)
PROT SERPL-MCNC: 5.2 G/DL (ref 6–8.5)
PROTHROMBIN TIME: 13.4 SECONDS (ref 12.1–14.7)
RBC # BLD AUTO: 2.7 10*6/MM3 (ref 3.77–5.28)
SODIUM SERPL-SCNC: 137 MMOL/L (ref 136–145)
UNIT  ABO: NORMAL
UNIT  RH: NORMAL
WBC NRBC COR # BLD: 8.17 10*3/MM3 (ref 3.4–10.8)

## 2022-09-22 PROCEDURE — 99232 SBSQ HOSP IP/OBS MODERATE 35: CPT | Performed by: INTERNAL MEDICINE

## 2022-09-22 PROCEDURE — 97530 THERAPEUTIC ACTIVITIES: CPT

## 2022-09-22 PROCEDURE — 85025 COMPLETE CBC W/AUTO DIFF WBC: CPT | Performed by: INTERNAL MEDICINE

## 2022-09-22 PROCEDURE — 80053 COMPREHEN METABOLIC PANEL: CPT | Performed by: INTERNAL MEDICINE

## 2022-09-22 PROCEDURE — 85610 PROTHROMBIN TIME: CPT | Performed by: PHYSICIAN ASSISTANT

## 2022-09-22 RX ADMIN — DOCUSATE SODIUM 100 MG: 100 CAPSULE ORAL at 20:06

## 2022-09-22 RX ADMIN — Medication 10 ML: at 08:32

## 2022-09-22 RX ADMIN — SODIUM BICARBONATE 75 MEQ: 84 INJECTION INTRAVENOUS at 08:28

## 2022-09-22 RX ADMIN — DOCUSATE SODIUM 100 MG: 100 CAPSULE ORAL at 08:32

## 2022-09-22 RX ADMIN — POLYETHYLENE GLYCOL (3350) 17 G: 17 POWDER, FOR SOLUTION ORAL at 08:32

## 2022-09-22 RX ADMIN — SODIUM BICARBONATE 75 MEQ: 84 INJECTION INTRAVENOUS at 11:47

## 2022-09-22 RX ADMIN — POLYETHYLENE GLYCOL (3350) 17 G: 17 POWDER, FOR SOLUTION ORAL at 20:06

## 2022-09-22 RX ADMIN — ESCITALOPRAM 10 MG: 10 TABLET, FILM COATED ORAL at 08:32

## 2022-09-22 RX ADMIN — Medication 10 ML: at 20:06

## 2022-09-22 RX ADMIN — Medication 10 ML: at 20:07

## 2022-09-23 LAB
ALBUMIN SERPL-MCNC: 3.06 G/DL (ref 3.5–5.2)
ALBUMIN/GLOB SERPL: 1.4 G/DL
ALP SERPL-CCNC: 59 U/L (ref 39–117)
ALT SERPL W P-5'-P-CCNC: 7 U/L (ref 1–33)
ANION GAP SERPL CALCULATED.3IONS-SCNC: 11.9 MMOL/L (ref 5–15)
AST SERPL-CCNC: 16 U/L (ref 1–32)
BASOPHILS # BLD AUTO: 0.02 10*3/MM3 (ref 0–0.2)
BASOPHILS NFR BLD AUTO: 0.3 % (ref 0–1.5)
BILIRUB SERPL-MCNC: 1.1 MG/DL (ref 0–1.2)
BUN SERPL-MCNC: 52 MG/DL (ref 8–23)
BUN/CREAT SERPL: 22.4 (ref 7–25)
CALCIUM SPEC-SCNC: 8.3 MG/DL (ref 8.6–10.5)
CHLORIDE SERPL-SCNC: 99 MMOL/L (ref 98–107)
CO2 SERPL-SCNC: 28.1 MMOL/L (ref 22–29)
CREAT SERPL-MCNC: 2.32 MG/DL (ref 0.57–1)
DEPRECATED RDW RBC AUTO: 57.4 FL (ref 37–54)
EGFRCR SERPLBLD CKD-EPI 2021: 19.7 ML/MIN/1.73
EOSINOPHIL # BLD AUTO: 0.06 10*3/MM3 (ref 0–0.4)
EOSINOPHIL NFR BLD AUTO: 0.8 % (ref 0.3–6.2)
ERYTHROCYTE [DISTWIDTH] IN BLOOD BY AUTOMATED COUNT: 19.1 % (ref 12.3–15.4)
GLOBULIN UR ELPH-MCNC: 2.1 GM/DL
GLUCOSE SERPL-MCNC: 110 MG/DL (ref 65–99)
HCT VFR BLD AUTO: 23.1 % (ref 34–46.6)
HGB BLD-MCNC: 7.5 G/DL (ref 12–15.9)
IMM GRANULOCYTES # BLD AUTO: 0.05 10*3/MM3 (ref 0–0.05)
IMM GRANULOCYTES NFR BLD AUTO: 0.6 % (ref 0–0.5)
INR PPP: 1.08 (ref 0.9–1.1)
LYMPHOCYTES # BLD AUTO: 0.67 10*3/MM3 (ref 0.7–3.1)
LYMPHOCYTES NFR BLD AUTO: 8.5 % (ref 19.6–45.3)
MCH RBC QN AUTO: 28.7 PG (ref 26.6–33)
MCHC RBC AUTO-ENTMCNC: 32.5 G/DL (ref 31.5–35.7)
MCV RBC AUTO: 88.5 FL (ref 79–97)
MONOCYTES # BLD AUTO: 0.79 10*3/MM3 (ref 0.1–0.9)
MONOCYTES NFR BLD AUTO: 10 % (ref 5–12)
NEUTROPHILS NFR BLD AUTO: 6.31 10*3/MM3 (ref 1.7–7)
NEUTROPHILS NFR BLD AUTO: 79.8 % (ref 42.7–76)
NRBC BLD AUTO-RTO: 0 /100 WBC (ref 0–0.2)
PLATELET # BLD AUTO: 161 10*3/MM3 (ref 140–450)
PMV BLD AUTO: 10.1 FL (ref 6–12)
POTASSIUM SERPL-SCNC: 3.6 MMOL/L (ref 3.5–5.2)
PROT SERPL-MCNC: 5.2 G/DL (ref 6–8.5)
PROTHROMBIN TIME: 14.2 SECONDS (ref 12.1–14.7)
RBC # BLD AUTO: 2.61 10*6/MM3 (ref 3.77–5.28)
SODIUM SERPL-SCNC: 139 MMOL/L (ref 136–145)
WBC NRBC COR # BLD: 7.9 10*3/MM3 (ref 3.4–10.8)

## 2022-09-23 PROCEDURE — 80053 COMPREHEN METABOLIC PANEL: CPT | Performed by: INTERNAL MEDICINE

## 2022-09-23 PROCEDURE — 97535 SELF CARE MNGMENT TRAINING: CPT

## 2022-09-23 PROCEDURE — 99233 SBSQ HOSP IP/OBS HIGH 50: CPT | Performed by: INTERNAL MEDICINE

## 2022-09-23 PROCEDURE — 97530 THERAPEUTIC ACTIVITIES: CPT

## 2022-09-23 PROCEDURE — 85610 PROTHROMBIN TIME: CPT | Performed by: PHYSICIAN ASSISTANT

## 2022-09-23 PROCEDURE — 25010000002 HEPARIN (PORCINE) PER 1000 UNITS: Performed by: INTERNAL MEDICINE

## 2022-09-23 PROCEDURE — 85025 COMPLETE CBC W/AUTO DIFF WBC: CPT | Performed by: INTERNAL MEDICINE

## 2022-09-23 PROCEDURE — 93005 ELECTROCARDIOGRAM TRACING: CPT | Performed by: INTERNAL MEDICINE

## 2022-09-23 RX ORDER — HEPARIN SODIUM 5000 [USP'U]/ML
5000 INJECTION, SOLUTION INTRAVENOUS; SUBCUTANEOUS EVERY 12 HOURS SCHEDULED
Status: DISCONTINUED | OUTPATIENT
Start: 2022-09-23 | End: 2022-10-05 | Stop reason: HOSPADM

## 2022-09-23 RX ADMIN — Medication 10 ML: at 21:38

## 2022-09-23 RX ADMIN — POLYETHYLENE GLYCOL (3350) 17 G: 17 POWDER, FOR SOLUTION ORAL at 08:36

## 2022-09-23 RX ADMIN — HYDROCODONE BITARTRATE AND ACETAMINOPHEN 1 TABLET: 7.5; 325 TABLET ORAL at 21:37

## 2022-09-23 RX ADMIN — Medication 10 ML: at 08:36

## 2022-09-23 RX ADMIN — DOCUSATE SODIUM 100 MG: 100 CAPSULE ORAL at 08:36

## 2022-09-23 RX ADMIN — HEPARIN SODIUM 5000 UNITS: 5000 INJECTION INTRAVENOUS; SUBCUTANEOUS at 21:38

## 2022-09-23 RX ADMIN — POLYETHYLENE GLYCOL (3350) 17 G: 17 POWDER, FOR SOLUTION ORAL at 21:38

## 2022-09-23 RX ADMIN — DOCUSATE SODIUM 100 MG: 100 CAPSULE ORAL at 21:37

## 2022-09-23 RX ADMIN — ESCITALOPRAM 10 MG: 10 TABLET, FILM COATED ORAL at 08:36

## 2022-09-23 RX ADMIN — HYDROCODONE BITARTRATE AND ACETAMINOPHEN 1 TABLET: 7.5; 325 TABLET ORAL at 07:06

## 2022-09-23 RX ADMIN — HEPARIN SODIUM 5000 UNITS: 5000 INJECTION INTRAVENOUS; SUBCUTANEOUS at 12:11

## 2022-09-23 RX ADMIN — GABAPENTIN 300 MG: 300 CAPSULE ORAL at 21:37

## 2022-09-24 LAB
ANION GAP SERPL CALCULATED.3IONS-SCNC: 11.8 MMOL/L (ref 5–15)
BACTERIA SPEC AEROBE CULT: NORMAL
BACTERIA SPEC AEROBE CULT: NORMAL
BUN SERPL-MCNC: 43 MG/DL (ref 8–23)
BUN/CREAT SERPL: 21.9 (ref 7–25)
CALCIUM SPEC-SCNC: 8.6 MG/DL (ref 8.6–10.5)
CHLORIDE SERPL-SCNC: 98 MMOL/L (ref 98–107)
CO2 SERPL-SCNC: 27.2 MMOL/L (ref 22–29)
CREAT SERPL-MCNC: 1.96 MG/DL (ref 0.57–1)
DEPRECATED RDW RBC AUTO: 59.6 FL (ref 37–54)
EGFRCR SERPLBLD CKD-EPI 2021: 24.1 ML/MIN/1.73
ERYTHROCYTE [DISTWIDTH] IN BLOOD BY AUTOMATED COUNT: 19.3 % (ref 12.3–15.4)
GLUCOSE SERPL-MCNC: 115 MG/DL (ref 65–99)
HCT VFR BLD AUTO: 24.5 % (ref 34–46.6)
HGB BLD-MCNC: 7.7 G/DL (ref 12–15.9)
INR PPP: 1.08 (ref 0.9–1.1)
MCH RBC QN AUTO: 28.8 PG (ref 26.6–33)
MCHC RBC AUTO-ENTMCNC: 31.4 G/DL (ref 31.5–35.7)
MCV RBC AUTO: 91.8 FL (ref 79–97)
PLATELET # BLD AUTO: 183 10*3/MM3 (ref 140–450)
PMV BLD AUTO: 10.3 FL (ref 6–12)
POTASSIUM SERPL-SCNC: 3.8 MMOL/L (ref 3.5–5.2)
PROTHROMBIN TIME: 14.2 SECONDS (ref 12.1–14.7)
QT INTERVAL: 426 MS
QTC INTERVAL: 472 MS
RBC # BLD AUTO: 2.67 10*6/MM3 (ref 3.77–5.28)
SODIUM SERPL-SCNC: 137 MMOL/L (ref 136–145)
TROPONIN T SERPL-MCNC: 0.02 NG/ML (ref 0–0.03)
WBC NRBC COR # BLD: 7.95 10*3/MM3 (ref 3.4–10.8)

## 2022-09-24 PROCEDURE — 85610 PROTHROMBIN TIME: CPT | Performed by: PHYSICIAN ASSISTANT

## 2022-09-24 PROCEDURE — 84484 ASSAY OF TROPONIN QUANT: CPT | Performed by: INTERNAL MEDICINE

## 2022-09-24 PROCEDURE — 85027 COMPLETE CBC AUTOMATED: CPT | Performed by: INTERNAL MEDICINE

## 2022-09-24 PROCEDURE — 80048 BASIC METABOLIC PNL TOTAL CA: CPT | Performed by: INTERNAL MEDICINE

## 2022-09-24 PROCEDURE — 99233 SBSQ HOSP IP/OBS HIGH 50: CPT | Performed by: INTERNAL MEDICINE

## 2022-09-24 PROCEDURE — 25010000002 HEPARIN (PORCINE) PER 1000 UNITS: Performed by: INTERNAL MEDICINE

## 2022-09-24 RX ADMIN — Medication 10 ML: at 21:37

## 2022-09-24 RX ADMIN — GABAPENTIN 300 MG: 300 CAPSULE ORAL at 21:35

## 2022-09-24 RX ADMIN — POLYETHYLENE GLYCOL (3350) 17 G: 17 POWDER, FOR SOLUTION ORAL at 08:39

## 2022-09-24 RX ADMIN — Medication 10 ML: at 08:40

## 2022-09-24 RX ADMIN — HEPARIN SODIUM 5000 UNITS: 5000 INJECTION INTRAVENOUS; SUBCUTANEOUS at 21:36

## 2022-09-24 RX ADMIN — DOCUSATE SODIUM 100 MG: 100 CAPSULE ORAL at 21:36

## 2022-09-24 RX ADMIN — ESCITALOPRAM 10 MG: 10 TABLET, FILM COATED ORAL at 08:39

## 2022-09-24 RX ADMIN — DOCUSATE SODIUM 100 MG: 100 CAPSULE ORAL at 08:39

## 2022-09-24 RX ADMIN — Medication 10 ML: at 08:39

## 2022-09-24 RX ADMIN — HEPARIN SODIUM 5000 UNITS: 5000 INJECTION INTRAVENOUS; SUBCUTANEOUS at 08:40

## 2022-09-24 RX ADMIN — HYDROCODONE BITARTRATE AND ACETAMINOPHEN 1 TABLET: 7.5; 325 TABLET ORAL at 21:35

## 2022-09-24 RX ADMIN — HYDROCODONE BITARTRATE AND ACETAMINOPHEN 1 TABLET: 7.5; 325 TABLET ORAL at 08:39

## 2022-09-24 RX ADMIN — POLYETHYLENE GLYCOL (3350) 17 G: 17 POWDER, FOR SOLUTION ORAL at 21:35

## 2022-09-25 LAB
ANION GAP SERPL CALCULATED.3IONS-SCNC: 10.5 MMOL/L (ref 5–15)
BUN SERPL-MCNC: 38 MG/DL (ref 8–23)
BUN/CREAT SERPL: 21.7 (ref 7–25)
CALCIUM SPEC-SCNC: 8.5 MG/DL (ref 8.6–10.5)
CHLORIDE SERPL-SCNC: 97 MMOL/L (ref 98–107)
CO2 SERPL-SCNC: 26.5 MMOL/L (ref 22–29)
CREAT SERPL-MCNC: 1.75 MG/DL (ref 0.57–1)
DEPRECATED RDW RBC AUTO: 60.8 FL (ref 37–54)
EGFRCR SERPLBLD CKD-EPI 2021: 27.6 ML/MIN/1.73
ERYTHROCYTE [DISTWIDTH] IN BLOOD BY AUTOMATED COUNT: 19.2 % (ref 12.3–15.4)
GLUCOSE SERPL-MCNC: 98 MG/DL (ref 65–99)
HCT VFR BLD AUTO: 24 % (ref 34–46.6)
HGB BLD-MCNC: 7.7 G/DL (ref 12–15.9)
MCH RBC QN AUTO: 29.4 PG (ref 26.6–33)
MCHC RBC AUTO-ENTMCNC: 32.1 G/DL (ref 31.5–35.7)
MCV RBC AUTO: 91.6 FL (ref 79–97)
PLATELET # BLD AUTO: 193 10*3/MM3 (ref 140–450)
PMV BLD AUTO: 10.4 FL (ref 6–12)
POTASSIUM SERPL-SCNC: 3.6 MMOL/L (ref 3.5–5.2)
RBC # BLD AUTO: 2.62 10*6/MM3 (ref 3.77–5.28)
SODIUM SERPL-SCNC: 134 MMOL/L (ref 136–145)
WBC NRBC COR # BLD: 7.59 10*3/MM3 (ref 3.4–10.8)

## 2022-09-25 PROCEDURE — 99233 SBSQ HOSP IP/OBS HIGH 50: CPT | Performed by: INTERNAL MEDICINE

## 2022-09-25 PROCEDURE — 25010000002 HEPARIN (PORCINE) PER 1000 UNITS: Performed by: INTERNAL MEDICINE

## 2022-09-25 PROCEDURE — 85027 COMPLETE CBC AUTOMATED: CPT | Performed by: INTERNAL MEDICINE

## 2022-09-25 PROCEDURE — 80048 BASIC METABOLIC PNL TOTAL CA: CPT | Performed by: INTERNAL MEDICINE

## 2022-09-25 RX ORDER — HYDRALAZINE HYDROCHLORIDE 25 MG/1
25 TABLET, FILM COATED ORAL EVERY 8 HOURS SCHEDULED
Status: DISCONTINUED | OUTPATIENT
Start: 2022-09-25 | End: 2022-09-28

## 2022-09-25 RX ADMIN — Medication 10 ML: at 08:34

## 2022-09-25 RX ADMIN — DOCUSATE SODIUM 100 MG: 100 CAPSULE ORAL at 22:04

## 2022-09-25 RX ADMIN — HYDROCODONE BITARTRATE AND ACETAMINOPHEN 1 TABLET: 7.5; 325 TABLET ORAL at 22:03

## 2022-09-25 RX ADMIN — GABAPENTIN 300 MG: 300 CAPSULE ORAL at 22:03

## 2022-09-25 RX ADMIN — HYDRALAZINE HYDROCHLORIDE 25 MG: 25 TABLET, FILM COATED ORAL at 15:19

## 2022-09-25 RX ADMIN — Medication 10 ML: at 22:04

## 2022-09-25 RX ADMIN — Medication 10 ML: at 08:35

## 2022-09-25 RX ADMIN — METOPROLOL TARTRATE 12.5 MG: 25 TABLET, FILM COATED ORAL at 15:19

## 2022-09-25 RX ADMIN — HEPARIN SODIUM 5000 UNITS: 5000 INJECTION INTRAVENOUS; SUBCUTANEOUS at 22:06

## 2022-09-25 RX ADMIN — METOPROLOL TARTRATE 12.5 MG: 25 TABLET, FILM COATED ORAL at 22:04

## 2022-09-25 RX ADMIN — ESCITALOPRAM 10 MG: 10 TABLET, FILM COATED ORAL at 08:34

## 2022-09-25 RX ADMIN — Medication 10 ML: at 22:41

## 2022-09-25 RX ADMIN — HEPARIN SODIUM 5000 UNITS: 5000 INJECTION INTRAVENOUS; SUBCUTANEOUS at 08:34

## 2022-09-25 RX ADMIN — POLYETHYLENE GLYCOL (3350) 17 G: 17 POWDER, FOR SOLUTION ORAL at 22:04

## 2022-09-26 ENCOUNTER — APPOINTMENT (OUTPATIENT)
Dept: GENERAL RADIOLOGY | Facility: HOSPITAL | Age: 87
End: 2022-09-26

## 2022-09-26 LAB
ALBUMIN SERPL-MCNC: 2.43 G/DL (ref 3.5–5.2)
ALBUMIN/GLOB SERPL: 0.9 G/DL
ALP SERPL-CCNC: 53 U/L (ref 39–117)
ALT SERPL W P-5'-P-CCNC: 7 U/L (ref 1–33)
ANION GAP SERPL CALCULATED.3IONS-SCNC: 9.2 MMOL/L (ref 5–15)
AST SERPL-CCNC: 17 U/L (ref 1–32)
BILIRUB SERPL-MCNC: 0.9 MG/DL (ref 0–1.2)
BUN SERPL-MCNC: 36 MG/DL (ref 8–23)
BUN/CREAT SERPL: 21.8 (ref 7–25)
CALCIUM SPEC-SCNC: 8.6 MG/DL (ref 8.6–10.5)
CHLORIDE SERPL-SCNC: 102 MMOL/L (ref 98–107)
CO2 SERPL-SCNC: 27.8 MMOL/L (ref 22–29)
CREAT SERPL-MCNC: 1.65 MG/DL (ref 0.57–1)
DEPRECATED RDW RBC AUTO: 64.9 FL (ref 37–54)
EGFRCR SERPLBLD CKD-EPI 2021: 29.6 ML/MIN/1.73
ERYTHROCYTE [DISTWIDTH] IN BLOOD BY AUTOMATED COUNT: 19.5 % (ref 12.3–15.4)
GLOBULIN UR ELPH-MCNC: 2.6 GM/DL
GLUCOSE SERPL-MCNC: 98 MG/DL (ref 65–99)
HCT VFR BLD AUTO: 22.8 % (ref 34–46.6)
HCT VFR BLD AUTO: 34.2 % (ref 34–46.6)
HGB BLD-MCNC: 7.1 G/DL (ref 12–15.9)
HGB BLD-MCNC: 9.7 G/DL (ref 12–15.9)
MCH RBC QN AUTO: 29.1 PG (ref 26.6–33)
MCHC RBC AUTO-ENTMCNC: 31.1 G/DL (ref 31.5–35.7)
MCV RBC AUTO: 93.4 FL (ref 79–97)
PLATELET # BLD AUTO: 177 10*3/MM3 (ref 140–450)
PMV BLD AUTO: 10.9 FL (ref 6–12)
POTASSIUM SERPL-SCNC: 4.1 MMOL/L (ref 3.5–5.2)
PROT SERPL-MCNC: 5 G/DL (ref 6–8.5)
RBC # BLD AUTO: 2.44 10*6/MM3 (ref 3.77–5.28)
SODIUM SERPL-SCNC: 139 MMOL/L (ref 136–145)
WBC NRBC COR # BLD: 6 10*3/MM3 (ref 3.4–10.8)

## 2022-09-26 PROCEDURE — 85014 HEMATOCRIT: CPT | Performed by: INTERNAL MEDICINE

## 2022-09-26 PROCEDURE — 25010000002 HEPARIN (PORCINE) PER 1000 UNITS: Performed by: INTERNAL MEDICINE

## 2022-09-26 PROCEDURE — 85018 HEMOGLOBIN: CPT | Performed by: INTERNAL MEDICINE

## 2022-09-26 PROCEDURE — 99232 SBSQ HOSP IP/OBS MODERATE 35: CPT | Performed by: INTERNAL MEDICINE

## 2022-09-26 PROCEDURE — 94799 UNLISTED PULMONARY SVC/PX: CPT

## 2022-09-26 PROCEDURE — 94664 DEMO&/EVAL PT USE INHALER: CPT

## 2022-09-26 PROCEDURE — 71045 X-RAY EXAM CHEST 1 VIEW: CPT

## 2022-09-26 PROCEDURE — 94640 AIRWAY INHALATION TREATMENT: CPT

## 2022-09-26 PROCEDURE — 80053 COMPREHEN METABOLIC PANEL: CPT | Performed by: INTERNAL MEDICINE

## 2022-09-26 PROCEDURE — 71045 X-RAY EXAM CHEST 1 VIEW: CPT | Performed by: RADIOLOGY

## 2022-09-26 PROCEDURE — 94761 N-INVAS EAR/PLS OXIMETRY MLT: CPT

## 2022-09-26 PROCEDURE — 85027 COMPLETE CBC AUTOMATED: CPT | Performed by: INTERNAL MEDICINE

## 2022-09-26 RX ORDER — IPRATROPIUM BROMIDE AND ALBUTEROL SULFATE 2.5; .5 MG/3ML; MG/3ML
3 SOLUTION RESPIRATORY (INHALATION)
Status: DISCONTINUED | OUTPATIENT
Start: 2022-09-26 | End: 2022-10-05 | Stop reason: HOSPADM

## 2022-09-26 RX ORDER — PANTOPRAZOLE SODIUM 40 MG/1
40 TABLET, DELAYED RELEASE ORAL
Status: DISCONTINUED | OUTPATIENT
Start: 2022-09-26 | End: 2022-10-05 | Stop reason: HOSPADM

## 2022-09-26 RX ADMIN — POLYETHYLENE GLYCOL (3350) 17 G: 17 POWDER, FOR SOLUTION ORAL at 08:23

## 2022-09-26 RX ADMIN — METOPROLOL TARTRATE 12.5 MG: 25 TABLET, FILM COATED ORAL at 08:22

## 2022-09-26 RX ADMIN — GABAPENTIN 300 MG: 300 CAPSULE ORAL at 22:13

## 2022-09-26 RX ADMIN — Medication 10 ML: at 08:23

## 2022-09-26 RX ADMIN — HYDRALAZINE HYDROCHLORIDE 25 MG: 25 TABLET, FILM COATED ORAL at 05:50

## 2022-09-26 RX ADMIN — IPRATROPIUM BROMIDE AND ALBUTEROL SULFATE 3 ML: .5; 2.5 SOLUTION RESPIRATORY (INHALATION) at 19:07

## 2022-09-26 RX ADMIN — HYDRALAZINE HYDROCHLORIDE 25 MG: 25 TABLET, FILM COATED ORAL at 14:41

## 2022-09-26 RX ADMIN — METOPROLOL TARTRATE 12.5 MG: 25 TABLET, FILM COATED ORAL at 22:12

## 2022-09-26 RX ADMIN — ESCITALOPRAM 10 MG: 10 TABLET, FILM COATED ORAL at 08:22

## 2022-09-26 RX ADMIN — Medication 10 ML: at 22:13

## 2022-09-26 RX ADMIN — Medication 10 ML: at 22:14

## 2022-09-26 RX ADMIN — HEPARIN SODIUM 5000 UNITS: 5000 INJECTION INTRAVENOUS; SUBCUTANEOUS at 22:12

## 2022-09-26 RX ADMIN — HYDRALAZINE HYDROCHLORIDE 25 MG: 25 TABLET, FILM COATED ORAL at 22:13

## 2022-09-26 RX ADMIN — HEPARIN SODIUM 5000 UNITS: 5000 INJECTION INTRAVENOUS; SUBCUTANEOUS at 08:23

## 2022-09-26 RX ADMIN — HYDROCODONE BITARTRATE AND ACETAMINOPHEN 1 TABLET: 7.5; 325 TABLET ORAL at 22:13

## 2022-09-26 RX ADMIN — PANTOPRAZOLE SODIUM 40 MG: 40 TABLET, DELAYED RELEASE ORAL at 10:14

## 2022-09-26 RX ADMIN — IPRATROPIUM BROMIDE AND ALBUTEROL SULFATE 3 ML: .5; 2.5 SOLUTION RESPIRATORY (INHALATION) at 12:40

## 2022-09-26 RX ADMIN — DOCUSATE SODIUM 100 MG: 100 CAPSULE ORAL at 22:13

## 2022-09-27 LAB
ALBUMIN SERPL-MCNC: 2.7 G/DL (ref 3.5–5.2)
ALBUMIN/GLOB SERPL: 1 G/DL
ALP SERPL-CCNC: 58 U/L (ref 39–117)
ALT SERPL W P-5'-P-CCNC: 6 U/L (ref 1–33)
ANION GAP SERPL CALCULATED.3IONS-SCNC: 9.2 MMOL/L (ref 5–15)
AST SERPL-CCNC: 18 U/L (ref 1–32)
BASOPHILS # BLD AUTO: 0.03 10*3/MM3 (ref 0–0.2)
BASOPHILS NFR BLD AUTO: 0.4 % (ref 0–1.5)
BILIRUB SERPL-MCNC: 0.8 MG/DL (ref 0–1.2)
BUN SERPL-MCNC: 34 MG/DL (ref 8–23)
BUN/CREAT SERPL: 19.5 (ref 7–25)
CALCIUM SPEC-SCNC: 8.6 MG/DL (ref 8.6–10.5)
CHLORIDE SERPL-SCNC: 100 MMOL/L (ref 98–107)
CO2 SERPL-SCNC: 26.8 MMOL/L (ref 22–29)
CREAT SERPL-MCNC: 1.74 MG/DL (ref 0.57–1)
DEPRECATED RDW RBC AUTO: 67.5 FL (ref 37–54)
EGFRCR SERPLBLD CKD-EPI 2021: 27.8 ML/MIN/1.73
EOSINOPHIL # BLD AUTO: 0.13 10*3/MM3 (ref 0–0.4)
EOSINOPHIL NFR BLD AUTO: 1.9 % (ref 0.3–6.2)
ERYTHROCYTE [DISTWIDTH] IN BLOOD BY AUTOMATED COUNT: 19.9 % (ref 12.3–15.4)
GLOBULIN UR ELPH-MCNC: 2.8 GM/DL
GLUCOSE SERPL-MCNC: 108 MG/DL (ref 65–99)
HCT VFR BLD AUTO: 25 % (ref 34–46.6)
HGB BLD-MCNC: 7.8 G/DL (ref 12–15.9)
IMM GRANULOCYTES # BLD AUTO: 0.03 10*3/MM3 (ref 0–0.05)
IMM GRANULOCYTES NFR BLD AUTO: 0.4 % (ref 0–0.5)
LYMPHOCYTES # BLD AUTO: 0.72 10*3/MM3 (ref 0.7–3.1)
LYMPHOCYTES NFR BLD AUTO: 10.6 % (ref 19.6–45.3)
MCH RBC QN AUTO: 29.3 PG (ref 26.6–33)
MCHC RBC AUTO-ENTMCNC: 31.2 G/DL (ref 31.5–35.7)
MCV RBC AUTO: 94 FL (ref 79–97)
MONOCYTES # BLD AUTO: 0.62 10*3/MM3 (ref 0.1–0.9)
MONOCYTES NFR BLD AUTO: 9.2 % (ref 5–12)
NEUTROPHILS NFR BLD AUTO: 5.24 10*3/MM3 (ref 1.7–7)
NEUTROPHILS NFR BLD AUTO: 77.5 % (ref 42.7–76)
NRBC BLD AUTO-RTO: 0 /100 WBC (ref 0–0.2)
PLATELET # BLD AUTO: 219 10*3/MM3 (ref 140–450)
PMV BLD AUTO: 10.7 FL (ref 6–12)
POTASSIUM SERPL-SCNC: 4.6 MMOL/L (ref 3.5–5.2)
PROT SERPL-MCNC: 5.5 G/DL (ref 6–8.5)
RBC # BLD AUTO: 2.66 10*6/MM3 (ref 3.77–5.28)
SODIUM SERPL-SCNC: 136 MMOL/L (ref 136–145)
WBC NRBC COR # BLD: 6.77 10*3/MM3 (ref 3.4–10.8)

## 2022-09-27 PROCEDURE — 92610 EVALUATE SWALLOWING FUNCTION: CPT

## 2022-09-27 PROCEDURE — 97535 SELF CARE MNGMENT TRAINING: CPT

## 2022-09-27 PROCEDURE — 80053 COMPREHEN METABOLIC PANEL: CPT | Performed by: INTERNAL MEDICINE

## 2022-09-27 PROCEDURE — 94799 UNLISTED PULMONARY SVC/PX: CPT

## 2022-09-27 PROCEDURE — 99232 SBSQ HOSP IP/OBS MODERATE 35: CPT | Performed by: INTERNAL MEDICINE

## 2022-09-27 PROCEDURE — 97110 THERAPEUTIC EXERCISES: CPT

## 2022-09-27 PROCEDURE — 25010000002 HEPARIN (PORCINE) PER 1000 UNITS: Performed by: INTERNAL MEDICINE

## 2022-09-27 PROCEDURE — 97530 THERAPEUTIC ACTIVITIES: CPT

## 2022-09-27 PROCEDURE — 85025 COMPLETE CBC W/AUTO DIFF WBC: CPT | Performed by: INTERNAL MEDICINE

## 2022-09-27 RX ADMIN — HYDRALAZINE HYDROCHLORIDE 25 MG: 25 TABLET, FILM COATED ORAL at 22:15

## 2022-09-27 RX ADMIN — HYDROCODONE BITARTRATE AND ACETAMINOPHEN 1 TABLET: 7.5; 325 TABLET ORAL at 22:15

## 2022-09-27 RX ADMIN — HEPARIN SODIUM 5000 UNITS: 5000 INJECTION INTRAVENOUS; SUBCUTANEOUS at 22:15

## 2022-09-27 RX ADMIN — HYDRALAZINE HYDROCHLORIDE 25 MG: 25 TABLET, FILM COATED ORAL at 13:59

## 2022-09-27 RX ADMIN — METOPROLOL TARTRATE 12.5 MG: 25 TABLET, FILM COATED ORAL at 08:20

## 2022-09-27 RX ADMIN — Medication 10 ML: at 22:17

## 2022-09-27 RX ADMIN — ESCITALOPRAM 10 MG: 10 TABLET, FILM COATED ORAL at 08:20

## 2022-09-27 RX ADMIN — Medication 10 ML: at 08:19

## 2022-09-27 RX ADMIN — IPRATROPIUM BROMIDE AND ALBUTEROL SULFATE 3 ML: .5; 2.5 SOLUTION RESPIRATORY (INHALATION) at 12:52

## 2022-09-27 RX ADMIN — Medication 10 ML: at 22:19

## 2022-09-27 RX ADMIN — IPRATROPIUM BROMIDE AND ALBUTEROL SULFATE 3 ML: .5; 2.5 SOLUTION RESPIRATORY (INHALATION) at 18:36

## 2022-09-27 RX ADMIN — IPRATROPIUM BROMIDE AND ALBUTEROL SULFATE 3 ML: .5; 2.5 SOLUTION RESPIRATORY (INHALATION) at 00:25

## 2022-09-27 RX ADMIN — PANTOPRAZOLE SODIUM 40 MG: 40 TABLET, DELAYED RELEASE ORAL at 06:22

## 2022-09-27 RX ADMIN — HEPARIN SODIUM 5000 UNITS: 5000 INJECTION INTRAVENOUS; SUBCUTANEOUS at 08:19

## 2022-09-27 RX ADMIN — HYDRALAZINE HYDROCHLORIDE 25 MG: 25 TABLET, FILM COATED ORAL at 06:22

## 2022-09-27 RX ADMIN — GABAPENTIN 300 MG: 300 CAPSULE ORAL at 22:15

## 2022-09-27 RX ADMIN — METOPROLOL TARTRATE 12.5 MG: 25 TABLET, FILM COATED ORAL at 22:17

## 2022-09-27 RX ADMIN — IPRATROPIUM BROMIDE AND ALBUTEROL SULFATE 3 ML: .5; 2.5 SOLUTION RESPIRATORY (INHALATION) at 06:13

## 2022-09-28 LAB
ANION GAP SERPL CALCULATED.3IONS-SCNC: 12.4 MMOL/L (ref 5–15)
BASOPHILS # BLD AUTO: 0.05 10*3/MM3 (ref 0–0.2)
BASOPHILS NFR BLD AUTO: 0.7 % (ref 0–1.5)
BUN SERPL-MCNC: 34 MG/DL (ref 8–23)
BUN/CREAT SERPL: 17.7 (ref 7–25)
CALCIUM SPEC-SCNC: 9 MG/DL (ref 8.6–10.5)
CHLORIDE SERPL-SCNC: 99 MMOL/L (ref 98–107)
CO2 SERPL-SCNC: 25.6 MMOL/L (ref 22–29)
CREAT SERPL-MCNC: 1.92 MG/DL (ref 0.57–1)
DEPRECATED RDW RBC AUTO: 69.2 FL (ref 37–54)
EGFRCR SERPLBLD CKD-EPI 2021: 24.7 ML/MIN/1.73
EOSINOPHIL # BLD AUTO: 0.25 10*3/MM3 (ref 0–0.4)
EOSINOPHIL NFR BLD AUTO: 3.4 % (ref 0.3–6.2)
ERYTHROCYTE [DISTWIDTH] IN BLOOD BY AUTOMATED COUNT: 19.9 % (ref 12.3–15.4)
GLUCOSE SERPL-MCNC: 126 MG/DL (ref 65–99)
HCT VFR BLD AUTO: 26.5 % (ref 34–46.6)
HGB BLD-MCNC: 8.1 G/DL (ref 12–15.9)
IMM GRANULOCYTES # BLD AUTO: 0.05 10*3/MM3 (ref 0–0.05)
IMM GRANULOCYTES NFR BLD AUTO: 0.7 % (ref 0–0.5)
LYMPHOCYTES # BLD AUTO: 0.65 10*3/MM3 (ref 0.7–3.1)
LYMPHOCYTES NFR BLD AUTO: 8.8 % (ref 19.6–45.3)
MCH RBC QN AUTO: 28.8 PG (ref 26.6–33)
MCHC RBC AUTO-ENTMCNC: 30.6 G/DL (ref 31.5–35.7)
MCV RBC AUTO: 94.3 FL (ref 79–97)
MONOCYTES # BLD AUTO: 0.59 10*3/MM3 (ref 0.1–0.9)
MONOCYTES NFR BLD AUTO: 8 % (ref 5–12)
NEUTROPHILS NFR BLD AUTO: 5.8 10*3/MM3 (ref 1.7–7)
NEUTROPHILS NFR BLD AUTO: 78.4 % (ref 42.7–76)
NRBC BLD AUTO-RTO: 0 /100 WBC (ref 0–0.2)
PLATELET # BLD AUTO: 171 10*3/MM3 (ref 140–450)
PMV BLD AUTO: 10.9 FL (ref 6–12)
POTASSIUM SERPL-SCNC: 4.5 MMOL/L (ref 3.5–5.2)
RBC # BLD AUTO: 2.81 10*6/MM3 (ref 3.77–5.28)
SODIUM SERPL-SCNC: 137 MMOL/L (ref 136–145)
WBC NRBC COR # BLD: 7.39 10*3/MM3 (ref 3.4–10.8)

## 2022-09-28 PROCEDURE — 25010000002 HEPARIN (PORCINE) PER 1000 UNITS: Performed by: INTERNAL MEDICINE

## 2022-09-28 PROCEDURE — 85025 COMPLETE CBC W/AUTO DIFF WBC: CPT | Performed by: INTERNAL MEDICINE

## 2022-09-28 PROCEDURE — 94761 N-INVAS EAR/PLS OXIMETRY MLT: CPT

## 2022-09-28 PROCEDURE — 94799 UNLISTED PULMONARY SVC/PX: CPT

## 2022-09-28 PROCEDURE — 99232 SBSQ HOSP IP/OBS MODERATE 35: CPT | Performed by: INTERNAL MEDICINE

## 2022-09-28 PROCEDURE — 80048 BASIC METABOLIC PNL TOTAL CA: CPT | Performed by: INTERNAL MEDICINE

## 2022-09-28 RX ORDER — HYDRALAZINE HYDROCHLORIDE 50 MG/1
50 TABLET, FILM COATED ORAL EVERY 8 HOURS SCHEDULED
Status: DISCONTINUED | OUTPATIENT
Start: 2022-09-28 | End: 2022-10-05 | Stop reason: HOSPADM

## 2022-09-28 RX ORDER — FUROSEMIDE 40 MG/1
40 TABLET ORAL ONCE
Status: COMPLETED | OUTPATIENT
Start: 2022-09-28 | End: 2022-09-28

## 2022-09-28 RX ADMIN — HEPARIN SODIUM 5000 UNITS: 5000 INJECTION INTRAVENOUS; SUBCUTANEOUS at 22:07

## 2022-09-28 RX ADMIN — IPRATROPIUM BROMIDE AND ALBUTEROL SULFATE 3 ML: .5; 2.5 SOLUTION RESPIRATORY (INHALATION) at 06:18

## 2022-09-28 RX ADMIN — METOPROLOL TARTRATE 12.5 MG: 25 TABLET, FILM COATED ORAL at 22:07

## 2022-09-28 RX ADMIN — HYDRALAZINE HYDROCHLORIDE 25 MG: 25 TABLET, FILM COATED ORAL at 05:39

## 2022-09-28 RX ADMIN — HEPARIN SODIUM 5000 UNITS: 5000 INJECTION INTRAVENOUS; SUBCUTANEOUS at 08:51

## 2022-09-28 RX ADMIN — HYDRALAZINE HYDROCHLORIDE 50 MG: 50 TABLET, FILM COATED ORAL at 14:16

## 2022-09-28 RX ADMIN — ESCITALOPRAM 10 MG: 10 TABLET, FILM COATED ORAL at 08:49

## 2022-09-28 RX ADMIN — DOCUSATE SODIUM 100 MG: 100 CAPSULE ORAL at 08:49

## 2022-09-28 RX ADMIN — FUROSEMIDE 40 MG: 40 TABLET ORAL at 14:16

## 2022-09-28 RX ADMIN — GABAPENTIN 300 MG: 300 CAPSULE ORAL at 22:17

## 2022-09-28 RX ADMIN — IPRATROPIUM BROMIDE AND ALBUTEROL SULFATE 3 ML: .5; 2.5 SOLUTION RESPIRATORY (INHALATION) at 18:19

## 2022-09-28 RX ADMIN — IPRATROPIUM BROMIDE AND ALBUTEROL SULFATE 3 ML: .5; 2.5 SOLUTION RESPIRATORY (INHALATION) at 12:43

## 2022-09-28 RX ADMIN — PANTOPRAZOLE SODIUM 40 MG: 40 TABLET, DELAYED RELEASE ORAL at 05:39

## 2022-09-28 RX ADMIN — Medication 10 ML: at 08:49

## 2022-09-28 RX ADMIN — DOCUSATE SODIUM 100 MG: 100 CAPSULE ORAL at 22:07

## 2022-09-28 RX ADMIN — Medication 10 ML: at 22:24

## 2022-09-28 RX ADMIN — METOPROLOL TARTRATE 12.5 MG: 25 TABLET, FILM COATED ORAL at 08:48

## 2022-09-28 RX ADMIN — HYDRALAZINE HYDROCHLORIDE 50 MG: 50 TABLET, FILM COATED ORAL at 22:07

## 2022-09-29 ENCOUNTER — APPOINTMENT (OUTPATIENT)
Dept: GENERAL RADIOLOGY | Facility: HOSPITAL | Age: 87
End: 2022-09-29

## 2022-09-29 LAB
ANION GAP SERPL CALCULATED.3IONS-SCNC: 10 MMOL/L (ref 5–15)
BACTERIA UR QL AUTO: ABNORMAL /HPF
BASOPHILS # BLD AUTO: 0.04 10*3/MM3 (ref 0–0.2)
BASOPHILS NFR BLD AUTO: 0.6 % (ref 0–1.5)
BILIRUB UR QL STRIP: NEGATIVE
BUN SERPL-MCNC: 36 MG/DL (ref 8–23)
BUN/CREAT SERPL: 18.8 (ref 7–25)
CALCIUM SPEC-SCNC: 9.1 MG/DL (ref 8.6–10.5)
CHLORIDE SERPL-SCNC: 102 MMOL/L (ref 98–107)
CLARITY UR: CLEAR
CO2 SERPL-SCNC: 25 MMOL/L (ref 22–29)
COLOR UR: YELLOW
CREAT SERPL-MCNC: 1.92 MG/DL (ref 0.57–1)
DEPRECATED RDW RBC AUTO: 68.9 FL (ref 37–54)
EGFRCR SERPLBLD CKD-EPI 2021: 24.7 ML/MIN/1.73
EOSINOPHIL # BLD AUTO: 0.22 10*3/MM3 (ref 0–0.4)
EOSINOPHIL NFR BLD AUTO: 3.1 % (ref 0.3–6.2)
ERYTHROCYTE [DISTWIDTH] IN BLOOD BY AUTOMATED COUNT: 20.1 % (ref 12.3–15.4)
GLUCOSE SERPL-MCNC: 100 MG/DL (ref 65–99)
GLUCOSE UR STRIP-MCNC: NEGATIVE MG/DL
HCT VFR BLD AUTO: 25.8 % (ref 34–46.6)
HGB BLD-MCNC: 7.7 G/DL (ref 12–15.9)
HGB UR QL STRIP.AUTO: NEGATIVE
HYALINE CASTS UR QL AUTO: ABNORMAL /LPF
IMM GRANULOCYTES # BLD AUTO: 0.03 10*3/MM3 (ref 0–0.05)
IMM GRANULOCYTES NFR BLD AUTO: 0.4 % (ref 0–0.5)
KETONES UR QL STRIP: NEGATIVE
LEUKOCYTE ESTERASE UR QL STRIP.AUTO: ABNORMAL
LYMPHOCYTES # BLD AUTO: 0.85 10*3/MM3 (ref 0.7–3.1)
LYMPHOCYTES NFR BLD AUTO: 11.9 % (ref 19.6–45.3)
MCH RBC QN AUTO: 28.4 PG (ref 26.6–33)
MCHC RBC AUTO-ENTMCNC: 29.8 G/DL (ref 31.5–35.7)
MCV RBC AUTO: 95.2 FL (ref 79–97)
MONOCYTES # BLD AUTO: 0.61 10*3/MM3 (ref 0.1–0.9)
MONOCYTES NFR BLD AUTO: 8.6 % (ref 5–12)
NEUTROPHILS NFR BLD AUTO: 5.37 10*3/MM3 (ref 1.7–7)
NEUTROPHILS NFR BLD AUTO: 75.4 % (ref 42.7–76)
NITRITE UR QL STRIP: NEGATIVE
NRBC BLD AUTO-RTO: 0 /100 WBC (ref 0–0.2)
PH UR STRIP.AUTO: 7.5 [PH] (ref 5–8)
PLATELET # BLD AUTO: 244 10*3/MM3 (ref 140–450)
PMV BLD AUTO: 9.9 FL (ref 6–12)
POTASSIUM SERPL-SCNC: 4.8 MMOL/L (ref 3.5–5.2)
PROCALCITONIN SERPL-MCNC: 0.19 NG/ML (ref 0–0.25)
PROT UR QL STRIP: NEGATIVE
RBC # BLD AUTO: 2.71 10*6/MM3 (ref 3.77–5.28)
RBC # UR STRIP: ABNORMAL /HPF
REF LAB TEST METHOD: ABNORMAL
SODIUM SERPL-SCNC: 137 MMOL/L (ref 136–145)
SP GR UR STRIP: 1.01 (ref 1–1.03)
SQUAMOUS #/AREA URNS HPF: ABNORMAL /HPF
UROBILINOGEN UR QL STRIP: ABNORMAL
WBC # UR STRIP: ABNORMAL /HPF
WBC NRBC COR # BLD: 7.12 10*3/MM3 (ref 3.4–10.8)

## 2022-09-29 PROCEDURE — 25010000002 HEPARIN (PORCINE) PER 1000 UNITS: Performed by: INTERNAL MEDICINE

## 2022-09-29 PROCEDURE — 80048 BASIC METABOLIC PNL TOTAL CA: CPT | Performed by: INTERNAL MEDICINE

## 2022-09-29 PROCEDURE — 94799 UNLISTED PULMONARY SVC/PX: CPT

## 2022-09-29 PROCEDURE — 71045 X-RAY EXAM CHEST 1 VIEW: CPT | Performed by: RADIOLOGY

## 2022-09-29 PROCEDURE — 87086 URINE CULTURE/COLONY COUNT: CPT | Performed by: INTERNAL MEDICINE

## 2022-09-29 PROCEDURE — 71045 X-RAY EXAM CHEST 1 VIEW: CPT

## 2022-09-29 PROCEDURE — 85025 COMPLETE CBC W/AUTO DIFF WBC: CPT | Performed by: INTERNAL MEDICINE

## 2022-09-29 PROCEDURE — 99231 SBSQ HOSP IP/OBS SF/LOW 25: CPT

## 2022-09-29 PROCEDURE — 94761 N-INVAS EAR/PLS OXIMETRY MLT: CPT

## 2022-09-29 PROCEDURE — 81001 URINALYSIS AUTO W/SCOPE: CPT | Performed by: INTERNAL MEDICINE

## 2022-09-29 PROCEDURE — 87040 BLOOD CULTURE FOR BACTERIA: CPT | Performed by: INTERNAL MEDICINE

## 2022-09-29 PROCEDURE — 85007 BL SMEAR W/DIFF WBC COUNT: CPT | Performed by: INTERNAL MEDICINE

## 2022-09-29 PROCEDURE — 25010000002 CEFTRIAXONE PER 250 MG: Performed by: INTERNAL MEDICINE

## 2022-09-29 PROCEDURE — 83605 ASSAY OF LACTIC ACID: CPT | Performed by: INTERNAL MEDICINE

## 2022-09-29 PROCEDURE — 84145 PROCALCITONIN (PCT): CPT | Performed by: INTERNAL MEDICINE

## 2022-09-29 RX ORDER — FUROSEMIDE 40 MG/1
40 TABLET ORAL DAILY
Status: DISCONTINUED | OUTPATIENT
Start: 2022-09-29 | End: 2022-10-02

## 2022-09-29 RX ADMIN — HYDRALAZINE HYDROCHLORIDE 50 MG: 50 TABLET, FILM COATED ORAL at 06:02

## 2022-09-29 RX ADMIN — ALPRAZOLAM 1 MG: 0.5 TABLET ORAL at 20:29

## 2022-09-29 RX ADMIN — IPRATROPIUM BROMIDE AND ALBUTEROL SULFATE 3 ML: .5; 2.5 SOLUTION RESPIRATORY (INHALATION) at 12:27

## 2022-09-29 RX ADMIN — HYDRALAZINE HYDROCHLORIDE 50 MG: 50 TABLET, FILM COATED ORAL at 13:23

## 2022-09-29 RX ADMIN — Medication 10 ML: at 08:59

## 2022-09-29 RX ADMIN — HYDRALAZINE HYDROCHLORIDE 50 MG: 50 TABLET, FILM COATED ORAL at 21:02

## 2022-09-29 RX ADMIN — HEPARIN SODIUM 5000 UNITS: 5000 INJECTION INTRAVENOUS; SUBCUTANEOUS at 08:59

## 2022-09-29 RX ADMIN — IPRATROPIUM BROMIDE AND ALBUTEROL SULFATE 3 ML: .5; 2.5 SOLUTION RESPIRATORY (INHALATION) at 19:28

## 2022-09-29 RX ADMIN — METOPROLOL TARTRATE 12.5 MG: 25 TABLET, FILM COATED ORAL at 20:29

## 2022-09-29 RX ADMIN — CEFTRIAXONE 1 G: 1 INJECTION, POWDER, FOR SOLUTION INTRAMUSCULAR; INTRAVENOUS at 23:35

## 2022-09-29 RX ADMIN — GABAPENTIN 300 MG: 300 CAPSULE ORAL at 20:30

## 2022-09-29 RX ADMIN — ESCITALOPRAM 10 MG: 10 TABLET, FILM COATED ORAL at 08:59

## 2022-09-29 RX ADMIN — Medication 10 ML: at 20:31

## 2022-09-29 RX ADMIN — PANTOPRAZOLE SODIUM 40 MG: 40 TABLET, DELAYED RELEASE ORAL at 06:02

## 2022-09-29 RX ADMIN — HYDROCODONE BITARTRATE AND ACETAMINOPHEN 1 TABLET: 7.5; 325 TABLET ORAL at 06:02

## 2022-09-29 RX ADMIN — IPRATROPIUM BROMIDE AND ALBUTEROL SULFATE 3 ML: .5; 2.5 SOLUTION RESPIRATORY (INHALATION) at 01:05

## 2022-09-29 RX ADMIN — FUROSEMIDE 40 MG: 40 TABLET ORAL at 10:14

## 2022-09-29 RX ADMIN — METOPROLOL TARTRATE 12.5 MG: 25 TABLET, FILM COATED ORAL at 08:59

## 2022-09-29 RX ADMIN — IPRATROPIUM BROMIDE AND ALBUTEROL SULFATE 3 ML: .5; 2.5 SOLUTION RESPIRATORY (INHALATION) at 07:15

## 2022-09-29 RX ADMIN — DOCUSATE SODIUM 100 MG: 100 CAPSULE ORAL at 08:59

## 2022-09-29 RX ADMIN — HEPARIN SODIUM 5000 UNITS: 5000 INJECTION INTRAVENOUS; SUBCUTANEOUS at 20:29

## 2022-09-30 LAB
ANION GAP SERPL CALCULATED.3IONS-SCNC: 11 MMOL/L (ref 5–15)
ANISOCYTOSIS BLD QL: NORMAL
BASOPHILS # BLD AUTO: 0.02 10*3/MM3 (ref 0–0.2)
BASOPHILS NFR BLD AUTO: 0.3 % (ref 0–1.5)
BUN SERPL-MCNC: 35 MG/DL (ref 8–23)
BUN/CREAT SERPL: 17.2 (ref 7–25)
CALCIUM SPEC-SCNC: 9.5 MG/DL (ref 8.6–10.5)
CHLORIDE SERPL-SCNC: 99 MMOL/L (ref 98–107)
CO2 SERPL-SCNC: 27 MMOL/L (ref 22–29)
CREAT SERPL-MCNC: 2.03 MG/DL (ref 0.57–1)
D-LACTATE SERPL-SCNC: 1.3 MMOL/L (ref 0.5–2)
DEPRECATED RDW RBC AUTO: 70.7 FL (ref 37–54)
EGFRCR SERPLBLD CKD-EPI 2021: 23.1 ML/MIN/1.73
EOSINOPHIL # BLD AUTO: 0.2 10*3/MM3 (ref 0–0.4)
EOSINOPHIL NFR BLD AUTO: 2.5 % (ref 0.3–6.2)
ERYTHROCYTE [DISTWIDTH] IN BLOOD BY AUTOMATED COUNT: 20.5 % (ref 12.3–15.4)
GLUCOSE SERPL-MCNC: 126 MG/DL (ref 65–99)
HCT VFR BLD AUTO: 27.7 % (ref 34–46.6)
HGB BLD-MCNC: 8.5 G/DL (ref 12–15.9)
HYPOCHROMIA BLD QL: NORMAL
IMM GRANULOCYTES # BLD AUTO: 0.04 10*3/MM3 (ref 0–0.05)
IMM GRANULOCYTES NFR BLD AUTO: 0.5 % (ref 0–0.5)
LARGE PLATELETS: NORMAL
LYMPHOCYTES # BLD AUTO: 0.68 10*3/MM3 (ref 0.7–3.1)
LYMPHOCYTES NFR BLD AUTO: 8.6 % (ref 19.6–45.3)
MCH RBC QN AUTO: 29 PG (ref 26.6–33)
MCHC RBC AUTO-ENTMCNC: 30.7 G/DL (ref 31.5–35.7)
MCV RBC AUTO: 94.5 FL (ref 79–97)
MONOCYTES # BLD AUTO: 0.64 10*3/MM3 (ref 0.1–0.9)
MONOCYTES NFR BLD AUTO: 8.1 % (ref 5–12)
NEUTROPHILS NFR BLD AUTO: 6.36 10*3/MM3 (ref 1.7–7)
NEUTROPHILS NFR BLD AUTO: 80 % (ref 42.7–76)
NRBC BLD AUTO-RTO: 0 /100 WBC (ref 0–0.2)
PLATELET # BLD AUTO: 267 10*3/MM3 (ref 140–450)
PMV BLD AUTO: 9.7 FL (ref 6–12)
POTASSIUM SERPL-SCNC: 4.4 MMOL/L (ref 3.5–5.2)
RBC # BLD AUTO: 2.93 10*6/MM3 (ref 3.77–5.28)
SODIUM SERPL-SCNC: 137 MMOL/L (ref 136–145)
WBC NRBC COR # BLD: 7.94 10*3/MM3 (ref 3.4–10.8)

## 2022-09-30 PROCEDURE — 25010000002 CEFTRIAXONE PER 250 MG: Performed by: INTERNAL MEDICINE

## 2022-09-30 PROCEDURE — 97530 THERAPEUTIC ACTIVITIES: CPT

## 2022-09-30 PROCEDURE — 97116 GAIT TRAINING THERAPY: CPT

## 2022-09-30 PROCEDURE — 25010000002 HYDRALAZINE PER 20 MG: Performed by: PHYSICIAN ASSISTANT

## 2022-09-30 PROCEDURE — 97535 SELF CARE MNGMENT TRAINING: CPT

## 2022-09-30 PROCEDURE — 25010000002 HEPARIN (PORCINE) PER 1000 UNITS: Performed by: INTERNAL MEDICINE

## 2022-09-30 PROCEDURE — 99231 SBSQ HOSP IP/OBS SF/LOW 25: CPT

## 2022-09-30 PROCEDURE — 94799 UNLISTED PULMONARY SVC/PX: CPT

## 2022-09-30 PROCEDURE — 94761 N-INVAS EAR/PLS OXIMETRY MLT: CPT

## 2022-09-30 RX ORDER — HYDRALAZINE HYDROCHLORIDE 20 MG/ML
10 INJECTION INTRAMUSCULAR; INTRAVENOUS ONCE
Status: COMPLETED | OUTPATIENT
Start: 2022-09-30 | End: 2022-09-30

## 2022-09-30 RX ORDER — POLYETHYLENE GLYCOL 3350 17 G/17G
17 POWDER, FOR SOLUTION ORAL 2 TIMES DAILY PRN
Status: DISCONTINUED | OUTPATIENT
Start: 2022-09-30 | End: 2022-10-05 | Stop reason: HOSPADM

## 2022-09-30 RX ADMIN — METOPROLOL TARTRATE 12.5 MG: 25 TABLET, FILM COATED ORAL at 20:31

## 2022-09-30 RX ADMIN — CEFTRIAXONE 1 G: 1 INJECTION, POWDER, FOR SOLUTION INTRAMUSCULAR; INTRAVENOUS at 21:32

## 2022-09-30 RX ADMIN — ESCITALOPRAM 10 MG: 10 TABLET, FILM COATED ORAL at 08:50

## 2022-09-30 RX ADMIN — Medication 10 ML: at 08:52

## 2022-09-30 RX ADMIN — FUROSEMIDE 40 MG: 40 TABLET ORAL at 08:50

## 2022-09-30 RX ADMIN — HYDRALAZINE HYDROCHLORIDE 50 MG: 50 TABLET, FILM COATED ORAL at 21:31

## 2022-09-30 RX ADMIN — HEPARIN SODIUM 5000 UNITS: 5000 INJECTION INTRAVENOUS; SUBCUTANEOUS at 08:51

## 2022-09-30 RX ADMIN — METOPROLOL TARTRATE 12.5 MG: 25 TABLET, FILM COATED ORAL at 08:50

## 2022-09-30 RX ADMIN — HEPARIN SODIUM 5000 UNITS: 5000 INJECTION INTRAVENOUS; SUBCUTANEOUS at 20:32

## 2022-09-30 RX ADMIN — POLYETHYLENE GLYCOL (3350) 17 G: 17 POWDER, FOR SOLUTION ORAL at 08:51

## 2022-09-30 RX ADMIN — IPRATROPIUM BROMIDE AND ALBUTEROL SULFATE 3 ML: .5; 2.5 SOLUTION RESPIRATORY (INHALATION) at 00:37

## 2022-09-30 RX ADMIN — Medication 10 ML: at 20:32

## 2022-09-30 RX ADMIN — IPRATROPIUM BROMIDE AND ALBUTEROL SULFATE 3 ML: .5; 2.5 SOLUTION RESPIRATORY (INHALATION) at 14:06

## 2022-09-30 RX ADMIN — DOCUSATE SODIUM 100 MG: 100 CAPSULE ORAL at 20:31

## 2022-09-30 RX ADMIN — PANTOPRAZOLE SODIUM 40 MG: 40 TABLET, DELAYED RELEASE ORAL at 05:27

## 2022-09-30 RX ADMIN — IPRATROPIUM BROMIDE AND ALBUTEROL SULFATE 3 ML: .5; 2.5 SOLUTION RESPIRATORY (INHALATION) at 19:37

## 2022-09-30 RX ADMIN — IPRATROPIUM BROMIDE AND ALBUTEROL SULFATE 3 ML: .5; 2.5 SOLUTION RESPIRATORY (INHALATION) at 07:43

## 2022-09-30 RX ADMIN — HYDRALAZINE HYDROCHLORIDE 10 MG: 20 INJECTION INTRAMUSCULAR; INTRAVENOUS at 03:09

## 2022-09-30 RX ADMIN — HYDRALAZINE HYDROCHLORIDE 50 MG: 50 TABLET, FILM COATED ORAL at 05:27

## 2022-09-30 RX ADMIN — HYDRALAZINE HYDROCHLORIDE 50 MG: 50 TABLET, FILM COATED ORAL at 14:49

## 2022-09-30 RX ADMIN — DOCUSATE SODIUM 100 MG: 100 CAPSULE ORAL at 08:50

## 2022-10-01 ENCOUNTER — APPOINTMENT (OUTPATIENT)
Dept: GENERAL RADIOLOGY | Facility: HOSPITAL | Age: 87
End: 2022-10-01

## 2022-10-01 LAB
ABSOLUTE LUNG FLUID CONTENT: 36 % (ref 20–35)
ANION GAP SERPL CALCULATED.3IONS-SCNC: 14.4 MMOL/L (ref 5–15)
BACTERIA SPEC AEROBE CULT: NO GROWTH
BUN SERPL-MCNC: 30 MG/DL (ref 8–23)
BUN/CREAT SERPL: 16 (ref 7–25)
CALCIUM SPEC-SCNC: 9.4 MG/DL (ref 8.6–10.5)
CHLORIDE SERPL-SCNC: 100 MMOL/L (ref 98–107)
CO2 SERPL-SCNC: 20.6 MMOL/L (ref 22–29)
CREAT SERPL-MCNC: 1.87 MG/DL (ref 0.57–1)
DEPRECATED RDW RBC AUTO: 73.7 FL (ref 37–54)
EGFRCR SERPLBLD CKD-EPI 2021: 25.5 ML/MIN/1.73
ERYTHROCYTE [DISTWIDTH] IN BLOOD BY AUTOMATED COUNT: 21.1 % (ref 12.3–15.4)
GLUCOSE SERPL-MCNC: 89 MG/DL (ref 65–99)
HCT VFR BLD AUTO: 28.3 % (ref 34–46.6)
HGB BLD-MCNC: 8.5 G/DL (ref 12–15.9)
MCH RBC QN AUTO: 29.4 PG (ref 26.6–33)
MCHC RBC AUTO-ENTMCNC: 30 G/DL (ref 31.5–35.7)
MCV RBC AUTO: 97.9 FL (ref 79–97)
NT-PROBNP SERPL-MCNC: ABNORMAL PG/ML (ref 0–1800)
PLATELET # BLD AUTO: 260 10*3/MM3 (ref 140–450)
PMV BLD AUTO: 10.1 FL (ref 6–12)
POTASSIUM SERPL-SCNC: 4.2 MMOL/L (ref 3.5–5.2)
RBC # BLD AUTO: 2.89 10*6/MM3 (ref 3.77–5.28)
SODIUM SERPL-SCNC: 135 MMOL/L (ref 136–145)
WBC NRBC COR # BLD: 6.44 10*3/MM3 (ref 3.4–10.8)

## 2022-10-01 PROCEDURE — 80048 BASIC METABOLIC PNL TOTAL CA: CPT

## 2022-10-01 PROCEDURE — 85027 COMPLETE CBC AUTOMATED: CPT

## 2022-10-01 PROCEDURE — 94726 PLETHYSMOGRAPHY LUNG VOLUMES: CPT

## 2022-10-01 PROCEDURE — 83880 ASSAY OF NATRIURETIC PEPTIDE: CPT | Performed by: INTERNAL MEDICINE

## 2022-10-01 PROCEDURE — 94799 UNLISTED PULMONARY SVC/PX: CPT

## 2022-10-01 PROCEDURE — 25010000002 HEPARIN (PORCINE) PER 1000 UNITS: Performed by: INTERNAL MEDICINE

## 2022-10-01 PROCEDURE — 99231 SBSQ HOSP IP/OBS SF/LOW 25: CPT

## 2022-10-01 PROCEDURE — 94664 DEMO&/EVAL PT USE INHALER: CPT

## 2022-10-01 PROCEDURE — 71045 X-RAY EXAM CHEST 1 VIEW: CPT

## 2022-10-01 PROCEDURE — 25010000002 CEFTRIAXONE PER 250 MG: Performed by: INTERNAL MEDICINE

## 2022-10-01 PROCEDURE — 94761 N-INVAS EAR/PLS OXIMETRY MLT: CPT

## 2022-10-01 RX ORDER — CHOLECALCIFEROL (VITAMIN D3) 125 MCG
10 CAPSULE ORAL NIGHTLY
Status: DISCONTINUED | OUTPATIENT
Start: 2022-10-01 | End: 2022-10-05 | Stop reason: HOSPADM

## 2022-10-01 RX ORDER — BUMETANIDE 0.25 MG/ML
1 INJECTION INTRAMUSCULAR; INTRAVENOUS ONCE
Status: COMPLETED | OUTPATIENT
Start: 2022-10-01 | End: 2022-10-01

## 2022-10-01 RX ADMIN — FUROSEMIDE 40 MG: 40 TABLET ORAL at 08:46

## 2022-10-01 RX ADMIN — Medication 10 MG: at 20:44

## 2022-10-01 RX ADMIN — HEPARIN SODIUM 5000 UNITS: 5000 INJECTION INTRAVENOUS; SUBCUTANEOUS at 20:45

## 2022-10-01 RX ADMIN — Medication 10 ML: at 21:38

## 2022-10-01 RX ADMIN — HEPARIN SODIUM 5000 UNITS: 5000 INJECTION INTRAVENOUS; SUBCUTANEOUS at 08:46

## 2022-10-01 RX ADMIN — CEFTRIAXONE 1 G: 1 INJECTION, POWDER, FOR SOLUTION INTRAMUSCULAR; INTRAVENOUS at 21:39

## 2022-10-01 RX ADMIN — IPRATROPIUM BROMIDE AND ALBUTEROL SULFATE 3 ML: .5; 2.5 SOLUTION RESPIRATORY (INHALATION) at 12:33

## 2022-10-01 RX ADMIN — Medication 10 ML: at 21:39

## 2022-10-01 RX ADMIN — IPRATROPIUM BROMIDE AND ALBUTEROL SULFATE 3 ML: .5; 2.5 SOLUTION RESPIRATORY (INHALATION) at 01:55

## 2022-10-01 RX ADMIN — ESCITALOPRAM 10 MG: 10 TABLET, FILM COATED ORAL at 08:46

## 2022-10-01 RX ADMIN — HYDRALAZINE HYDROCHLORIDE 50 MG: 50 TABLET, FILM COATED ORAL at 15:07

## 2022-10-01 RX ADMIN — Medication 10 ML: at 08:47

## 2022-10-01 RX ADMIN — HYDRALAZINE HYDROCHLORIDE 50 MG: 50 TABLET, FILM COATED ORAL at 05:23

## 2022-10-01 RX ADMIN — HYDRALAZINE HYDROCHLORIDE 50 MG: 50 TABLET, FILM COATED ORAL at 21:38

## 2022-10-01 RX ADMIN — METOPROLOL TARTRATE 12.5 MG: 25 TABLET, FILM COATED ORAL at 20:44

## 2022-10-01 RX ADMIN — DOCUSATE SODIUM 100 MG: 100 CAPSULE ORAL at 08:46

## 2022-10-01 RX ADMIN — IPRATROPIUM BROMIDE AND ALBUTEROL SULFATE 3 ML: .5; 2.5 SOLUTION RESPIRATORY (INHALATION) at 19:15

## 2022-10-01 RX ADMIN — PANTOPRAZOLE SODIUM 40 MG: 40 TABLET, DELAYED RELEASE ORAL at 05:23

## 2022-10-01 RX ADMIN — IPRATROPIUM BROMIDE AND ALBUTEROL SULFATE 3 ML: .5; 2.5 SOLUTION RESPIRATORY (INHALATION) at 07:37

## 2022-10-01 RX ADMIN — METOPROLOL TARTRATE 12.5 MG: 25 TABLET, FILM COATED ORAL at 08:46

## 2022-10-01 RX ADMIN — BUMETANIDE 1 MG: 0.25 INJECTION, SOLUTION INTRAMUSCULAR; INTRAVENOUS at 17:16

## 2022-10-01 NOTE — PLAN OF CARE
Goal Outcome Evaluation:              Outcome Evaluation: Patient rested in bed today, states she does not feel well today. Family visited, no complaints. Will monitor.

## 2022-10-01 NOTE — PROGRESS NOTES
Patient Identification:  Name:  Tre Kinney  Age:  89 y.o.  Sex:  female  :  1932  MRN:  [id number]  Visit Number:  [id number]  Primary Care Provider:  Jessica Pedro APRN    Length of stay:  12    Subjective/Interval History/Consultants/Procedures     Chief Complaint:   Chief Complaint   Patient presents with   • Fall       Subjective/Interval History:    89 y.o. female who was admitted on 2022 with anemia.     PMH is significant for chronic diastolic CHF, Paroxysmal Afib, CKD, HTN    For complete admission information, please see history and physical.     Consultations:   Orthopedics, Surgery, Palliative, Nephrology    Procedures/Scans:  CT lower extremity right without contrast- 22  CT Abdomen Pelvis without Contrast- 22     Patient was seen and examined with SONAM Calvert. She is awake and alert and appears to be comfortable. She states that she doesn't feel well. She states that she in unable to sleep well at night while in the hospital. She also states that she has not eaten much because she doesn't have an appetite.     She endorses increased shortness of breath today. She states she is still having an occasional cough that is not productive. She says that her dysuria has completely resolved and she reports no associated lower abdominal discomfort. Denies N/V. Denies chest pain.    Discussed with AM SONAM Calvert with no acute events overnight. Room location at the time of evaluation was [facility].    ----------------------------------------------------------------------------------------------------------------------  Current Hospital Meds:  cefTRIAXone, 1 g, Intravenous, Q24H  docusate sodium, 100 mg, Oral, BID  escitalopram, 10 mg, Oral, Daily  furosemide, 40 mg, Oral, Daily  gabapentin, 300 mg, Oral, Nightly  heparin (porcine), 5,000 Units, Subcutaneous, Q12H  hydrALAZINE, 50 mg, Oral, Q8H  ipratropium-albuterol, 3 mL, Nebulization, 4x Daily - RT  metoprolol tartrate, 12.5 mg, Oral,  Q12H  pantoprazole, 40 mg, Oral, Q AM  sodium chloride, 10 mL, Intravenous, Q12H  sodium chloride, 10 mL, Intravenous, Q12H         ----------------------------------------------------------------------------------------------------------------------      Objective     Vital Signs:  Temp:  [98 °F (36.7 °C)-98.6 °F (37 °C)] 98 °F (36.7 °C)  Heart Rate:  [75-98] 98  Resp:  [18-22] 18  BP: (134-150)/(54-78) 134/54      09/29/22  0603 09/30/22  0527 10/01/22  0500   Weight: 79.6 kg (175 lb 6.4 oz) 79.3 kg (174 lb 14.4 oz) 79.2 kg (174 lb 9.6 oz)     Body mass index is 27.35 kg/m².    Intake/Output Summary (Last 24 hours) at 10/1/2022 1009  Last data filed at 10/1/2022 0300  Gross per 24 hour   Intake 120 ml   Output 300 ml   Net -180 ml     No intake/output data recorded.  Diet Soft Texture; Chopped; Thin  ----------------------------------------------------------------------------------------------------------------------    Physical Exam  Vitals and nursing note reviewed.   HENT:      Head: Normocephalic.   Cardiovascular:      Rate and Rhythm: Normal rate and regular rhythm.   Pulmonary:      Effort: Pulmonary effort is normal.      Breath sounds: Normal breath sounds.   Abdominal:      General: Bowel sounds are normal.      Palpations: Abdomen is soft.   Musculoskeletal:      Cervical back: Normal range of motion.      Right lower leg: Edema present.      Left lower leg: Edema present.      Comments: Lower extremity edema mild. No change from yesterday.   Skin:     General: Skin is warm and dry.      Findings: Bruising present.   Neurological:      Mental Status: She is alert.   Psychiatric:         Mood and Affect: Mood normal.         Behavior: Behavior normal.                ----------------------------------------------------------------------------------------------------------------------  ----------------------------------------------------------------------------------------------------------------------       Results from last 7 days   Lab Units 10/01/22  0409 09/29/22  2349 09/29/22  2317 09/29/22  0119   LACTATE mmol/L  --   --  1.3  --    WBC 10*3/mm3 6.44 7.94  --  7.12   HEMOGLOBIN g/dL 8.5* 8.5*  --  7.7*   HEMATOCRIT % 28.3* 27.7*  --  25.8*   MCV fL 97.9* 94.5  --  95.2   MCHC g/dL 30.0* 30.7*  --  29.8*   PLATELETS 10*3/mm3 260 267  --  244         Results from last 7 days   Lab Units 10/01/22  0409 09/29/22  2349 09/29/22  0119 09/28/22  0020 09/27/22  0039 09/26/22  0048   SODIUM mmol/L 135* 137 137   < > 136 139   POTASSIUM mmol/L 4.2 4.4 4.8   < > 4.6 4.1   CHLORIDE mmol/L 100 99 102   < > 100 102   CO2 mmol/L 20.6* 27.0 25.0   < > 26.8 27.8   BUN mg/dL 30* 35* 36*   < > 34* 36*   CREATININE mg/dL 1.87* 2.03* 1.92*   < > 1.74* 1.65*   CALCIUM mg/dL 9.4 9.5 9.1   < > 8.6 8.6   GLUCOSE mg/dL 89 126* 100*   < > 108* 98   ALBUMIN g/dL  --   --   --   --  2.70* 2.43*   BILIRUBIN mg/dL  --   --   --   --  0.8 0.9   ALK PHOS U/L  --   --   --   --  58 53   AST (SGOT) U/L  --   --   --   --  18 17   ALT (SGPT) U/L  --   --   --   --  6 7    < > = values in this interval not displayed.   Estimated Creatinine Clearance: 22.1 mL/min (A) (by C-G formula based on SCr of 1.87 mg/dL (H)).  No results found for: AMMONIA      Blood Culture   Date Value Ref Range Status   09/29/2022 No growth at 24 hours  Preliminary     Urine Culture   Date Value Ref Range Status   09/29/2022 No growth  Preliminary     No results found for: WOUNDCX  No results found for: STOOLCX  ----------------------------------------------------------------------------------------------------------------------  Imaging Results (Last 24 Hours)     ** No results found for the last 24 hours. **        ----------------------------------------------------------------------------------------------------------------------   I have reviewed the above laboratory values for 10/01/22    Assessment/Plan     Active Hospital Problems    Diagnosis  POA   • **Anemia,  unspecified type [D64.9]  Yes         ASSESSMENT/PLAN:  Traumatic Right Gluteal Hematoma  Acute blood loss anemia:   Held Coumadin on admission.   Received Vit K and Kcentra in ED due to supratherapeutic INR  Please see CT imaging.  H&H today 8.5 and 28.3, respectively  S/p 3 units PRBC      Chronic diastolic CHF  Worsening Dyspnea  Repeat CXR  Check BNP  Continue Lasix   Echo as above.   Continue to monitor volume status    Chest pain   elevated troponin   Likely type II/demand ischemia in the setting of acute blood loss anemia.   Trended troponin, overall flat trend and has since normalized.  TTE obtained with an EF of 51-55%with severe pulmonary HTN.        DIANA on suspected Stage 4 CKD:   BUN- 30 Creatinine- 1.87 today will continue to monitor   Repeat labs in the AM   Monitor urine input/output   Avoid nephrotoxic medication    Acute Urinary Tract Infection/ Dysuria  Continue short course Rocephin  Patient states dysuria resolved       Right knee pain:   Pt seen by orthopedics, no acute Fx.   Continue supportive treatment.     Moderate acute superior endplate vertebral L1 compression fracture:   Continue supportive treatment.      Chronic compression fracture deformities of inferior endplates L3 and L4:   Continue supportive treatment.      Essential HTN: Home metoprolol and hydralazine previously restarted at reduced doses.  Hydralazine increased to 50 mg on 09/28  Continue to monitor and adjust as needed.      Confusion:   CT head on 9/8 negative for any acute abnormalities.  Pt alert and oriented at time of exam.   Cont supportive treatment.      Paroxysmal Afib:   Currently in NSR.  Attending discussed risk/benefit of continuing anticoagulation with Pt and family, decision made to hold Coumadin at this time.  Continue to monitor      Generalized anxiety and depression: Cont Lexapro.      Generalized weakness: PT/OT    -----------  -DVT prophylaxis: Heparin SQ  -Disposition plans/anticipated  needs:SNF        The patient is high risk due to the following diagnoses/reasons: Anemia, CHF, history of falls, advanced age        Ankit Tai PA-C  10/01/22  10:09 EDT

## 2022-10-01 NOTE — PLAN OF CARE
Goal Outcome Evaluation:              Outcome Evaluation: Patient has slept well throughout the night, she has been able to void and couple of times through the shift. bladder scan showed 500ml but she declined to be straight cathed. Patient does not take Gabapentin at home and refused on my med pass. Vital signs have been stable, no new complaints. Will continue to monitor patient.

## 2022-10-02 LAB
ALBUMIN SERPL-MCNC: 3.24 G/DL (ref 3.5–5.2)
ALBUMIN/GLOB SERPL: 1.2 G/DL
ALP SERPL-CCNC: 67 U/L (ref 39–117)
ALT SERPL W P-5'-P-CCNC: 8 U/L (ref 1–33)
ANION GAP SERPL CALCULATED.3IONS-SCNC: 13.9 MMOL/L (ref 5–15)
ANISOCYTOSIS BLD QL: NORMAL
AST SERPL-CCNC: 14 U/L (ref 1–32)
BASOPHILS # BLD AUTO: 0.04 10*3/MM3 (ref 0–0.2)
BASOPHILS NFR BLD AUTO: 0.6 % (ref 0–1.5)
BILIRUB SERPL-MCNC: 0.8 MG/DL (ref 0–1.2)
BUN SERPL-MCNC: 27 MG/DL (ref 8–23)
BUN/CREAT SERPL: 14.1 (ref 7–25)
CALCIUM SPEC-SCNC: 9.2 MG/DL (ref 8.6–10.5)
CHLORIDE SERPL-SCNC: 100 MMOL/L (ref 98–107)
CO2 SERPL-SCNC: 24.1 MMOL/L (ref 22–29)
CREAT SERPL-MCNC: 1.91 MG/DL (ref 0.57–1)
DEPRECATED RDW RBC AUTO: 73.5 FL (ref 37–54)
EGFRCR SERPLBLD CKD-EPI 2021: 24.8 ML/MIN/1.73
EOSINOPHIL # BLD AUTO: 0.11 10*3/MM3 (ref 0–0.4)
EOSINOPHIL NFR BLD AUTO: 1.7 % (ref 0.3–6.2)
ERYTHROCYTE [DISTWIDTH] IN BLOOD BY AUTOMATED COUNT: 20.8 % (ref 12.3–15.4)
GLOBULIN UR ELPH-MCNC: 2.8 GM/DL
GLUCOSE SERPL-MCNC: 106 MG/DL (ref 65–99)
HCT VFR BLD AUTO: 28.3 % (ref 34–46.6)
HGB BLD-MCNC: 8.7 G/DL (ref 12–15.9)
HYPOCHROMIA BLD QL: NORMAL
IMM GRANULOCYTES # BLD AUTO: 0.03 10*3/MM3 (ref 0–0.05)
IMM GRANULOCYTES NFR BLD AUTO: 0.5 % (ref 0–0.5)
LYMPHOCYTES # BLD AUTO: 0.66 10*3/MM3 (ref 0.7–3.1)
LYMPHOCYTES NFR BLD AUTO: 10.4 % (ref 19.6–45.3)
MACROCYTES BLD QL SMEAR: NORMAL
MCH RBC QN AUTO: 30 PG (ref 26.6–33)
MCHC RBC AUTO-ENTMCNC: 30.7 G/DL (ref 31.5–35.7)
MCV RBC AUTO: 97.6 FL (ref 79–97)
MONOCYTES # BLD AUTO: 0.51 10*3/MM3 (ref 0.1–0.9)
MONOCYTES NFR BLD AUTO: 8.1 % (ref 5–12)
NEUTROPHILS NFR BLD AUTO: 4.97 10*3/MM3 (ref 1.7–7)
NEUTROPHILS NFR BLD AUTO: 78.7 % (ref 42.7–76)
NRBC BLD AUTO-RTO: 0 /100 WBC (ref 0–0.2)
OVALOCYTES BLD QL SMEAR: NORMAL
PLAT MORPH BLD: NORMAL
PLATELET # BLD AUTO: 239 10*3/MM3 (ref 140–450)
PMV BLD AUTO: 9.2 FL (ref 6–12)
POTASSIUM SERPL-SCNC: 4.1 MMOL/L (ref 3.5–5.2)
PROT SERPL-MCNC: 6 G/DL (ref 6–8.5)
QT INTERVAL: 496 MS
QTC INTERVAL: 546 MS
RBC # BLD AUTO: 2.9 10*6/MM3 (ref 3.77–5.28)
SODIUM SERPL-SCNC: 138 MMOL/L (ref 136–145)
TROPONIN T SERPL-MCNC: 0.03 NG/ML (ref 0–0.03)
WBC NRBC COR # BLD: 6.32 10*3/MM3 (ref 3.4–10.8)

## 2022-10-02 PROCEDURE — 25010000002 HEPARIN (PORCINE) PER 1000 UNITS: Performed by: INTERNAL MEDICINE

## 2022-10-02 PROCEDURE — 94761 N-INVAS EAR/PLS OXIMETRY MLT: CPT

## 2022-10-02 PROCEDURE — 94799 UNLISTED PULMONARY SVC/PX: CPT

## 2022-10-02 PROCEDURE — 85007 BL SMEAR W/DIFF WBC COUNT: CPT | Performed by: INTERNAL MEDICINE

## 2022-10-02 PROCEDURE — 93005 ELECTROCARDIOGRAM TRACING: CPT | Performed by: INTERNAL MEDICINE

## 2022-10-02 PROCEDURE — 25010000002 CEFTRIAXONE PER 250 MG: Performed by: INTERNAL MEDICINE

## 2022-10-02 PROCEDURE — 94664 DEMO&/EVAL PT USE INHALER: CPT

## 2022-10-02 PROCEDURE — 84484 ASSAY OF TROPONIN QUANT: CPT

## 2022-10-02 PROCEDURE — 80053 COMPREHEN METABOLIC PANEL: CPT | Performed by: INTERNAL MEDICINE

## 2022-10-02 PROCEDURE — 85025 COMPLETE CBC W/AUTO DIFF WBC: CPT | Performed by: INTERNAL MEDICINE

## 2022-10-02 PROCEDURE — 99231 SBSQ HOSP IP/OBS SF/LOW 25: CPT

## 2022-10-02 PROCEDURE — 93010 ELECTROCARDIOGRAM REPORT: CPT | Performed by: INTERNAL MEDICINE

## 2022-10-02 RX ORDER — BUMETANIDE 0.25 MG/ML
2 INJECTION INTRAMUSCULAR; INTRAVENOUS ONCE
Status: COMPLETED | OUTPATIENT
Start: 2022-10-02 | End: 2022-10-02

## 2022-10-02 RX ORDER — BUMETANIDE 0.25 MG/ML
1 INJECTION INTRAMUSCULAR; INTRAVENOUS ONCE
Status: DISCONTINUED | OUTPATIENT
Start: 2022-10-02 | End: 2022-10-02

## 2022-10-02 RX ADMIN — HYDRALAZINE HYDROCHLORIDE 50 MG: 50 TABLET, FILM COATED ORAL at 22:32

## 2022-10-02 RX ADMIN — ESCITALOPRAM 10 MG: 10 TABLET, FILM COATED ORAL at 09:18

## 2022-10-02 RX ADMIN — HEPARIN SODIUM 5000 UNITS: 5000 INJECTION INTRAVENOUS; SUBCUTANEOUS at 09:18

## 2022-10-02 RX ADMIN — METOPROLOL TARTRATE 12.5 MG: 25 TABLET, FILM COATED ORAL at 21:15

## 2022-10-02 RX ADMIN — BUMETANIDE 2 MG: 0.25 INJECTION, SOLUTION INTRAMUSCULAR; INTRAVENOUS at 13:22

## 2022-10-02 RX ADMIN — HYDRALAZINE HYDROCHLORIDE 50 MG: 50 TABLET, FILM COATED ORAL at 05:12

## 2022-10-02 RX ADMIN — Medication 10 ML: at 21:20

## 2022-10-02 RX ADMIN — IPRATROPIUM BROMIDE AND ALBUTEROL SULFATE 3 ML: .5; 2.5 SOLUTION RESPIRATORY (INHALATION) at 18:31

## 2022-10-02 RX ADMIN — IPRATROPIUM BROMIDE AND ALBUTEROL SULFATE 3 ML: .5; 2.5 SOLUTION RESPIRATORY (INHALATION) at 13:04

## 2022-10-02 RX ADMIN — FUROSEMIDE 40 MG: 40 TABLET ORAL at 09:18

## 2022-10-02 RX ADMIN — Medication 10 ML: at 09:20

## 2022-10-02 RX ADMIN — PANTOPRAZOLE SODIUM 40 MG: 40 TABLET, DELAYED RELEASE ORAL at 05:12

## 2022-10-02 RX ADMIN — HYDRALAZINE HYDROCHLORIDE 50 MG: 50 TABLET, FILM COATED ORAL at 13:26

## 2022-10-02 RX ADMIN — IPRATROPIUM BROMIDE AND ALBUTEROL SULFATE 3 ML: .5; 2.5 SOLUTION RESPIRATORY (INHALATION) at 07:20

## 2022-10-02 RX ADMIN — Medication 10 ML: at 21:15

## 2022-10-02 RX ADMIN — METOPROLOL TARTRATE 12.5 MG: 25 TABLET, FILM COATED ORAL at 09:18

## 2022-10-02 RX ADMIN — HEPARIN SODIUM 5000 UNITS: 5000 INJECTION INTRAVENOUS; SUBCUTANEOUS at 21:16

## 2022-10-02 RX ADMIN — CEFTRIAXONE 1 G: 1 INJECTION, POWDER, FOR SOLUTION INTRAMUSCULAR; INTRAVENOUS at 22:33

## 2022-10-02 RX ADMIN — IPRATROPIUM BROMIDE AND ALBUTEROL SULFATE 3 ML: .5; 2.5 SOLUTION RESPIRATORY (INHALATION) at 00:04

## 2022-10-02 RX ADMIN — Medication 10 MG: at 21:14

## 2022-10-02 NOTE — PLAN OF CARE
Goal Outcome Evaluation:              Outcome Evaluation: Patient states she felt some better than prior. She has rested during this shift. VSS. Will continue to monitor.

## 2022-10-02 NOTE — PLAN OF CARE
Goal Outcome Evaluation:              Outcome Evaluation: Patient rested in bed today, states she doesn't feel well today, will monitor.

## 2022-10-02 NOTE — PROGRESS NOTES
Patient Identification:  Name:  Tre Kinney  Age:  89 y.o.  Sex:  female  :  1932  MRN:  [id number]  Visit Number:  [id number]  Primary Care Provider:  Jessica Pedro APRN    Length of stay:  13    Subjective/Interval History/Consultants/Procedures     Chief Complaint:   Chief Complaint   Patient presents with   • Fall       Subjective/Interval History:    89 y.o. female who was admitted on 2022 with anemia.     PMH is significant for chronic diastolic CHF, paroxysmal afib, CKD, HTN  For complete admission information, please see history and physical.     Consultations:  Orthopedics, surgery, palliative, nephrology    Procedures/Scans:  CT lower extremity right without contrast- 22  CT Abdomen Pelvis without Contrast- 22    Today, the patient was seen and examined with SONAM Calvert. She is awake and alert and appears comfortable. She states that she feels about the same as the last time we spoke, but she is feeling lonely. She states she is still unable to rest well. She reports that her dyspnea is worse today. She states her cough has improved since last AM and is less bothersome.     She states that she experienced chest pain last night but did not report it to the nursing staff. She says it was pressure behind her sternum with radiation to her upper back. She states it lasted for 30 minutes and resolved.     Discussed with AM SONAM Calvert with  no acute events overnight.     ----------------------------------------------------------------------------------------------------------------------  Current Hospital Meds:  cefTRIAXone, 1 g, Intravenous, Q24H  docusate sodium, 100 mg, Oral, BID  escitalopram, 10 mg, Oral, Daily  furosemide, 40 mg, Oral, Daily  gabapentin, 300 mg, Oral, Nightly  heparin (porcine), 5,000 Units, Subcutaneous, Q12H  hydrALAZINE, 50 mg, Oral, Q8H  ipratropium-albuterol, 3 mL, Nebulization, 4x Daily - RT  melatonin, 10 mg, Oral, Nightly  metoprolol tartrate, 12.5 mg,  Oral, Q12H  pantoprazole, 40 mg, Oral, Q AM  sodium chloride, 10 mL, Intravenous, Q12H  sodium chloride, 10 mL, Intravenous, Q12H         ----------------------------------------------------------------------------------------------------------------------      Objective     Vital Signs:  Temp:  [98.2 °F (36.8 °C)-98.9 °F (37.2 °C)] 98.3 °F (36.8 °C)  Heart Rate:  [76-91] 77  Resp:  [18-20] 20  BP: (113-167)/(52-80) 153/60      10/01/22  0500 10/02/22  0300 10/02/22  0600   Weight: 79.2 kg (174 lb 9.6 oz) 80.3 kg (177 lb) 80.3 kg (177 lb)     Body mass index is 27.72 kg/m².    Intake/Output Summary (Last 24 hours) at 10/2/2022 0938  Last data filed at 10/2/2022 0900  Gross per 24 hour   Intake 660 ml   Output 1900 ml   Net -1240 ml     I/O this shift:  In: 120 [P.O.:120]  Out: -   Diet Soft Texture; Chopped; Thin  ----------------------------------------------------------------------------------------------------------------------    Physical Exam  HENT:      Head: Normocephalic.   Cardiovascular:      Rate and Rhythm: Normal rate and regular rhythm.   Pulmonary:      Effort: Pulmonary effort is normal.      Breath sounds: Wheezing present.      Comments: Bilateral expiratory wheeze  Abdominal:      General: Bowel sounds are normal.      Palpations: Abdomen is soft.   Musculoskeletal:      Cervical back: Normal range of motion.      Right lower leg: Edema present.      Left lower leg: Edema present.      Comments: Mild lower extremity edema. No change vs. 10/1   Skin:     Findings: Bruising present.      Comments: R. Gluteal hematoma   Neurological:      Mental Status: She is alert and oriented to person, place, and time. Mental status is at baseline.                ----------------------------------------------------------------------------------------------------------------------  ----------------------------------------------------------------------------------------------------------------------  Results from  last 7 days   Lab Units 10/02/22  0848 10/01/22  1246   TROPONIN T ng/mL 0.029  --    PROBNP pg/mL  --  27,968.0*     Results from last 7 days   Lab Units 10/02/22  0848 10/01/22  0409 09/29/22  2349 09/29/22  2317   LACTATE mmol/L  --   --   --  1.3   WBC 10*3/mm3 6.32 6.44 7.94  --    HEMOGLOBIN g/dL 8.7* 8.5* 8.5*  --    HEMATOCRIT % 28.3* 28.3* 27.7*  --    MCV fL 97.6* 97.9* 94.5  --    MCHC g/dL 30.7* 30.0* 30.7*  --    PLATELETS 10*3/mm3 239 260 267  --          Results from last 7 days   Lab Units 10/02/22  0848 10/01/22  0409 09/29/22  2349 09/28/22  0020 09/27/22  0039 09/26/22  0048   SODIUM mmol/L 138 135* 137   < > 136 139   POTASSIUM mmol/L 4.1 4.2 4.4   < > 4.6 4.1   CHLORIDE mmol/L 100 100 99   < > 100 102   CO2 mmol/L 24.1 20.6* 27.0   < > 26.8 27.8   BUN mg/dL 27* 30* 35*   < > 34* 36*   CREATININE mg/dL 1.91* 1.87* 2.03*   < > 1.74* 1.65*   CALCIUM mg/dL 9.2 9.4 9.5   < > 8.6 8.6   GLUCOSE mg/dL 106* 89 126*   < > 108* 98   ALBUMIN g/dL 3.24*  --   --   --  2.70* 2.43*   BILIRUBIN mg/dL 0.8  --   --   --  0.8 0.9   ALK PHOS U/L 67  --   --   --  58 53   AST (SGOT) U/L 14  --   --   --  18 17   ALT (SGPT) U/L 8  --   --   --  6 7    < > = values in this interval not displayed.   Estimated Creatinine Clearance: 21.8 mL/min (A) (by C-G formula based on SCr of 1.91 mg/dL (H)).  No results found for: AMMONIA      Blood Culture   Date Value Ref Range Status   09/29/2022 No growth at 2 days  Preliminary     Urine Culture   Date Value Ref Range Status   09/29/2022 No growth  Final     No results found for: WOUNDCX  No results found for: STOOLCX  ----------------------------------------------------------------------------------------------------------------------  Imaging Results (Last 24 Hours)     Procedure Component Value Units Date/Time    XR Chest 1 View [[id number]] Collected: 10/01/22 1407     Updated: 10/01/22 1408    Narrative:      CR Chest 1 Vw    INDICATION:   Worsening dyspnea.     COMPARISON:     Chest 9/29/2022    FINDINGS:  Portable AP view(s) of the chest.    Persistent bibasilar atelectasis/infiltrate. No large pleural effusions. Cardiomegaly stable. No pneumothorax. Left-sided pacing complex.       Impression:      No change from 9/29/2022.    Signer Name: Marvin Edward MD   Signed: 10/1/2022 2:03 PM   Workstation Name: [email or web address]    [facility]        ----------------------------------------------------------------------------------------------------------------------   I have reviewed the above laboratory values for 10/02/22    Assessment/Plan     Active Hospital Problems    Diagnosis  POA   • **Anemia, unspecified type [D64.9]  Yes         ASSESSMENT/PLAN:  Traumatic Right Gluteal Hematoma  Acute blood loss anemia:   Held Coumadin on admission.   Received Vit K and Kcentra in ED due to supratherapeutic INR  Please see CT imaging.  H&H 8.7 and 28.3 today and stable   S/p 3 units PRBC   Continue to monitor   AM labs     Chronic diastolic CHF  Worsening Dyspnea  CXR 10/1/22 read is: no significant change from 09/29/22, persistent bibasilar atelectasis/infiltrate  BNP significantly increased from 09/19/22 @27,968  Bumex given one time on 10/01  Will review labs and adjust treatment plan as appropriate  Continue Lasix   Echo as above.   Continue to monitor volume status     Chest pain   elevated troponin   Likely type II/demand ischemia in the setting of acute blood loss anemia.   Trended troponin, overall flat trend and has since normalized.  TTE obtained with an EF of 51-55%with severe pulmonary HTN.   Troponin ordered, result within normal limits, 0.029  Pending EKG        DIANA on suspected Stage 4 CKD:   BUN- 27 Creatinine- 1.91 today will continue to monitor   Repeat labs in the AM   Monitor urine input/output   Avoid nephrotoxic medication     Acute Urinary Tract Infection/ Dysuria  Continue short course Rocephin  Patient states dysuria resolved        Right knee  pain:   Pt seen by orthopedics, no acute Fx.   Continue supportive treatment.     Moderate acute superior endplate vertebral L1 compression fracture:   Continue supportive treatment.      Chronic compression fracture deformities of inferior endplates L3 and L4:   Continue supportive treatment.      Essential HTN: Home metoprolol and hydralazine previously restarted at reduced doses.  Hydralazine increased to 50 mg on 09/28  Continue to monitor and adjust as needed.      Confusion:   CT head on 9/8 negative for any acute abnormalities.  Pt alert and oriented at time of exam.   Cont supportive treatment.      Paroxysmal Afib:   Currently in NSR.  Attending discussed risk/benefit of continuing anticoagulation with Pt and family, decision made to hold Coumadin at this time.  Continue to monitor      Generalized anxiety and depression: Cont Lexapro.      Generalized weakness: PT/OT    -DVT prophylaxis: heparin subq  -Disposition plans/anticipated needs:SNF        The patient is high risk due to the following diagnoses/reasons:  Anemia, CHF, Hx of falls, advanced age      Ankit Tai PA-C  10/02/22  09:38 EDT

## 2022-10-03 LAB
ABSOLUTE LUNG FLUID CONTENT: 37 % (ref 20–35)
ALBUMIN SERPL-MCNC: 3.1 G/DL (ref 3.5–5.2)
ALBUMIN/GLOB SERPL: 1 G/DL
ALP SERPL-CCNC: 66 U/L (ref 39–117)
ALT SERPL W P-5'-P-CCNC: 8 U/L (ref 1–33)
ANION GAP SERPL CALCULATED.3IONS-SCNC: 13.4 MMOL/L (ref 5–15)
ANISOCYTOSIS BLD QL: NORMAL
AST SERPL-CCNC: 14 U/L (ref 1–32)
BASOPHILS # BLD AUTO: 0.06 10*3/MM3 (ref 0–0.2)
BASOPHILS NFR BLD AUTO: 1 % (ref 0–1.5)
BILIRUB SERPL-MCNC: 0.8 MG/DL (ref 0–1.2)
BUN SERPL-MCNC: 25 MG/DL (ref 8–23)
BUN/CREAT SERPL: 13.1 (ref 7–25)
CALCIUM SPEC-SCNC: 9.5 MG/DL (ref 8.6–10.5)
CHLORIDE SERPL-SCNC: 101 MMOL/L (ref 98–107)
CO2 SERPL-SCNC: 23.6 MMOL/L (ref 22–29)
CREAT SERPL-MCNC: 1.91 MG/DL (ref 0.57–1)
DEPRECATED RDW RBC AUTO: 72.3 FL (ref 37–54)
EGFRCR SERPLBLD CKD-EPI 2021: 24.8 ML/MIN/1.73
EOSINOPHIL # BLD AUTO: 0.11 10*3/MM3 (ref 0–0.4)
EOSINOPHIL NFR BLD AUTO: 1.8 % (ref 0.3–6.2)
ERYTHROCYTE [DISTWIDTH] IN BLOOD BY AUTOMATED COUNT: 20.9 % (ref 12.3–15.4)
GLOBULIN UR ELPH-MCNC: 3.1 GM/DL
GLUCOSE SERPL-MCNC: 108 MG/DL (ref 65–99)
HCT VFR BLD AUTO: 29.6 % (ref 34–46.6)
HGB BLD-MCNC: 9.1 G/DL (ref 12–15.9)
HYPOCHROMIA BLD QL: NORMAL
IMM GRANULOCYTES # BLD AUTO: 0.02 10*3/MM3 (ref 0–0.05)
IMM GRANULOCYTES NFR BLD AUTO: 0.3 % (ref 0–0.5)
LYMPHOCYTES # BLD AUTO: 0.71 10*3/MM3 (ref 0.7–3.1)
LYMPHOCYTES NFR BLD AUTO: 11.9 % (ref 19.6–45.3)
MACROCYTES BLD QL SMEAR: NORMAL
MCH RBC QN AUTO: 29.5 PG (ref 26.6–33)
MCHC RBC AUTO-ENTMCNC: 30.7 G/DL (ref 31.5–35.7)
MCV RBC AUTO: 96.1 FL (ref 79–97)
MONOCYTES # BLD AUTO: 0.54 10*3/MM3 (ref 0.1–0.9)
MONOCYTES NFR BLD AUTO: 9 % (ref 5–12)
NEUTROPHILS NFR BLD AUTO: 4.54 10*3/MM3 (ref 1.7–7)
NEUTROPHILS NFR BLD AUTO: 76 % (ref 42.7–76)
NRBC BLD AUTO-RTO: 0 /100 WBC (ref 0–0.2)
OVALOCYTES BLD QL SMEAR: NORMAL
PLAT MORPH BLD: NORMAL
PLATELET # BLD AUTO: 238 10*3/MM3 (ref 140–450)
PMV BLD AUTO: 9.7 FL (ref 6–12)
POTASSIUM SERPL-SCNC: 3.8 MMOL/L (ref 3.5–5.2)
PROT SERPL-MCNC: 6.2 G/DL (ref 6–8.5)
RBC # BLD AUTO: 3.08 10*6/MM3 (ref 3.77–5.28)
SODIUM SERPL-SCNC: 138 MMOL/L (ref 136–145)
WBC NRBC COR # BLD: 5.98 10*3/MM3 (ref 3.4–10.8)

## 2022-10-03 PROCEDURE — 80053 COMPREHEN METABOLIC PANEL: CPT | Performed by: INTERNAL MEDICINE

## 2022-10-03 PROCEDURE — 97530 THERAPEUTIC ACTIVITIES: CPT

## 2022-10-03 PROCEDURE — 85007 BL SMEAR W/DIFF WBC COUNT: CPT | Performed by: INTERNAL MEDICINE

## 2022-10-03 PROCEDURE — 94726 PLETHYSMOGRAPHY LUNG VOLUMES: CPT

## 2022-10-03 PROCEDURE — 94799 UNLISTED PULMONARY SVC/PX: CPT

## 2022-10-03 PROCEDURE — 99232 SBSQ HOSP IP/OBS MODERATE 35: CPT | Performed by: INTERNAL MEDICINE

## 2022-10-03 PROCEDURE — 94761 N-INVAS EAR/PLS OXIMETRY MLT: CPT

## 2022-10-03 PROCEDURE — 25010000002 CEFTRIAXONE PER 250 MG: Performed by: INTERNAL MEDICINE

## 2022-10-03 PROCEDURE — 25010000002 HEPARIN (PORCINE) PER 1000 UNITS: Performed by: INTERNAL MEDICINE

## 2022-10-03 PROCEDURE — 85025 COMPLETE CBC W/AUTO DIFF WBC: CPT | Performed by: INTERNAL MEDICINE

## 2022-10-03 RX ORDER — FUROSEMIDE 40 MG/1
40 TABLET ORAL DAILY
Status: DISCONTINUED | OUTPATIENT
Start: 2022-10-03 | End: 2022-10-04

## 2022-10-03 RX ADMIN — IPRATROPIUM BROMIDE AND ALBUTEROL SULFATE 3 ML: .5; 2.5 SOLUTION RESPIRATORY (INHALATION) at 01:11

## 2022-10-03 RX ADMIN — ESCITALOPRAM 10 MG: 10 TABLET, FILM COATED ORAL at 08:22

## 2022-10-03 RX ADMIN — IPRATROPIUM BROMIDE AND ALBUTEROL SULFATE 3 ML: .5; 2.5 SOLUTION RESPIRATORY (INHALATION) at 13:35

## 2022-10-03 RX ADMIN — HEPARIN SODIUM 5000 UNITS: 5000 INJECTION INTRAVENOUS; SUBCUTANEOUS at 20:48

## 2022-10-03 RX ADMIN — Medication 10 ML: at 20:48

## 2022-10-03 RX ADMIN — METOPROLOL TARTRATE 12.5 MG: 25 TABLET, FILM COATED ORAL at 20:46

## 2022-10-03 RX ADMIN — METOPROLOL TARTRATE 12.5 MG: 25 TABLET, FILM COATED ORAL at 08:22

## 2022-10-03 RX ADMIN — Medication 10 ML: at 08:24

## 2022-10-03 RX ADMIN — Medication 10 ML: at 08:23

## 2022-10-03 RX ADMIN — IPRATROPIUM BROMIDE AND ALBUTEROL SULFATE 3 ML: .5; 2.5 SOLUTION RESPIRATORY (INHALATION) at 06:31

## 2022-10-03 RX ADMIN — CEFTRIAXONE 1 G: 1 INJECTION, POWDER, FOR SOLUTION INTRAMUSCULAR; INTRAVENOUS at 22:33

## 2022-10-03 RX ADMIN — HEPARIN SODIUM 5000 UNITS: 5000 INJECTION INTRAVENOUS; SUBCUTANEOUS at 08:23

## 2022-10-03 RX ADMIN — HYDRALAZINE HYDROCHLORIDE 50 MG: 50 TABLET, FILM COATED ORAL at 22:33

## 2022-10-03 RX ADMIN — Medication 10 MG: at 20:46

## 2022-10-03 RX ADMIN — HYDRALAZINE HYDROCHLORIDE 50 MG: 50 TABLET, FILM COATED ORAL at 16:00

## 2022-10-03 RX ADMIN — HYDRALAZINE HYDROCHLORIDE 50 MG: 50 TABLET, FILM COATED ORAL at 05:40

## 2022-10-03 RX ADMIN — FUROSEMIDE 40 MG: 40 TABLET ORAL at 13:50

## 2022-10-03 RX ADMIN — IPRATROPIUM BROMIDE AND ALBUTEROL SULFATE 3 ML: .5; 2.5 SOLUTION RESPIRATORY (INHALATION) at 19:15

## 2022-10-03 RX ADMIN — PANTOPRAZOLE SODIUM 40 MG: 40 TABLET, DELAYED RELEASE ORAL at 05:40

## 2022-10-03 NOTE — PLAN OF CARE
Goal Outcome Evaluation:              Outcome Evaluation: Pt resting in bed at this time. Complained of pressure in abd, bladder scanned pt 999+ noted. Straight cath x1, 900ml urine returned. No complaints of pain or discomfort. VSS. Will continue POC.

## 2022-10-03 NOTE — PROGRESS NOTES
Patient Identification:  Name:  Tre Kinney  Age:  89 y.o.  Sex:  female  :  1932  MRN:  [id number]  Visit Number:  [id number]  Primary Care Provider:  Jessica Pedro APRN    Length of stay:  14    Subjective/Interval History/Consultants/Procedures     Chief Complaint:   Chief Complaint   Patient presents with   • Fall       Subjective/Interval History:    89 y.o. female who was admitted on 2022 with Anemia.     PMH is significant for chronic diastolic CHF, paroxysmal Afib, CKD, HTN  For complete admission information, please see history and physical.     Consultations:  Orthopedics, surgery, palliative, nephrology      Procedures/Scans:  CT lower extremity right without contrast- 22  CT Abdomen and pelvis without contrast- 22    Today, the patient was seen and examined with SONAM Rodriguez. Patient states she is much improved this morning and her dyspnea is significantly better. She states she was able to rest well last PM. She denies any dysuria. Denies chest pain overnight. She reports improved appetite.     Discussed with AM SONAM Rodriguez with no acute events overnight.    ----------------------------------------------------------------------------------------------------------------------  Current Hospital Meds:  cefTRIAXone, 1 g, Intravenous, Q24H  docusate sodium, 100 mg, Oral, BID  escitalopram, 10 mg, Oral, Daily  gabapentin, 300 mg, Oral, Nightly  heparin (porcine), 5,000 Units, Subcutaneous, Q12H  hydrALAZINE, 50 mg, Oral, Q8H  ipratropium-albuterol, 3 mL, Nebulization, 4x Daily - RT  melatonin, 10 mg, Oral, Nightly  metoprolol tartrate, 12.5 mg, Oral, Q12H  pantoprazole, 40 mg, Oral, Q AM  sodium chloride, 10 mL, Intravenous, Q12H  sodium chloride, 10 mL, Intravenous, Q12H         ----------------------------------------------------------------------------------------------------------------------      Objective     Vital Signs:  Temp:  [97.9 °F (36.6 °C)-98.3 °F (36.8 °C)] 97.9 °F  (36.6 °C)  Heart Rate:  [67-86] 78  Resp:  [18-20] 18  BP: (125-174)/(56-90) 174/78      10/02/22  0300 10/02/22  0600 10/03/22  0500   Weight: 80.3 kg (177 lb) 80.3 kg (177 lb) 79.2 kg (174 lb 8 oz)     Body mass index is 27.33 kg/m².    Intake/Output Summary (Last 24 hours) at 10/3/2022 0904  Last data filed at 10/3/2022 0630  Gross per 24 hour   Intake 730 ml   Output 3600 ml   Net -2870 ml     No intake/output data recorded.  Diet Soft Texture; Chopped; Thin  ----------------------------------------------------------------------------------------------------------------------    Physical Exam  Vitals and nursing note reviewed.   HENT:      Head: Normocephalic and atraumatic.   Cardiovascular:      Rate and Rhythm: Normal rate and regular rhythm.   Pulmonary:      Effort: Pulmonary effort is normal.      Breath sounds: Normal breath sounds.   Abdominal:      General: Bowel sounds are normal.      Palpations: Abdomen is soft.      Tenderness: There is no abdominal tenderness.   Musculoskeletal:      Cervical back: Normal range of motion.      Right lower leg: Edema present.      Left lower leg: Edema present.      Comments: Trace edema   Skin:     General: Skin is warm and dry.   Neurological:      Mental Status: She is alert.                ----------------------------------------------------------------------------------------------------------------------    ----------------------------------------------------------------------------------------------------------------------  Results from last 7 days   Lab Units 10/02/22  0848 10/01/22  1246   TROPONIN T ng/mL 0.029  --    PROBNP pg/mL  --  27,968.0*     Results from last 7 days   Lab Units 10/03/22  0304 10/02/22  0848 10/01/22  0409 09/29/22  2349 09/29/22  2317   LACTATE mmol/L  --   --   --   --  1.3   WBC 10*3/mm3 5.98 6.32 6.44   < >  --    HEMOGLOBIN g/dL 9.1* 8.7* 8.5*   < >  --    HEMATOCRIT % 29.6* 28.3* 28.3*   < >  --    MCV fL 96.1 97.6* 97.9*   <  >  --    MCHC g/dL 30.7* 30.7* 30.0*   < >  --    PLATELETS 10*3/mm3 238 239 260   < >  --     < > = values in this interval not displayed.         Results from last 7 days   Lab Units 10/03/22  0304 10/02/22  0848 10/01/22  0409 09/28/22  0020 09/27/22  0039   SODIUM mmol/L 138 138 135*   < > 136   POTASSIUM mmol/L 3.8 4.1 4.2   < > 4.6   CHLORIDE mmol/L 101 100 100   < > 100   CO2 mmol/L 23.6 24.1 20.6*   < > 26.8   BUN mg/dL 25* 27* 30*   < > 34*   CREATININE mg/dL 1.91* 1.91* 1.87*   < > 1.74*   CALCIUM mg/dL 9.5 9.2 9.4   < > 8.6   GLUCOSE mg/dL 108* 106* 89   < > 108*   ALBUMIN g/dL 3.10* 3.24*  --   --  2.70*   BILIRUBIN mg/dL 0.8 0.8  --   --  0.8   ALK PHOS U/L 66 67  --   --  58   AST (SGOT) U/L 14 14  --   --  18   ALT (SGPT) U/L 8 8  --   --  6    < > = values in this interval not displayed.   Estimated Creatinine Clearance: 21.6 mL/min (A) (by C-G formula based on SCr of 1.91 mg/dL (H)).  No results found for: AMMONIA      Blood Culture   Date Value Ref Range Status   09/29/2022 No growth at 3 days  Preliminary     Urine Culture   Date Value Ref Range Status   09/29/2022 No growth  Final     No results found for: WOUNDCX  No results found for: STOOLCX  ----------------------------------------------------------------------------------------------------------------------  Imaging Results (Last 24 Hours)     ** No results found for the last 24 hours. **        ----------------------------------------------------------------------------------------------------------------------   I have reviewed the above laboratory values for 10/03/22    Assessment/Plan     Active Hospital Problems    Diagnosis  POA   • **Anemia, unspecified type [D64.9]  Yes         ASSESSMENT/PLAN:  Traumatic Right Gluteal Hematoma  Acute blood loss anemia:   Held Coumadin on admission.   Received Vit K and Kcentra in ED due to supratherapeutic INR  Please see CT imaging.  H&H improved to 9.1 and 29.6 today, respectively  S/p 3 units  PRBC   Continue to monitor   AM labs     Chronic diastolic CHF  Worsening Dyspnea  Will review labs and adjust treatment plan as appropriate  Resume home Lasix  ReDs vest tomorrow   Echo as above.   Continue to monitor volume status  Dyspnea improved per patient today     Chest pain   elevated troponin   Likely type II/demand ischemia in the setting of acute blood loss anemia.   Trended troponin, overall flat trend and has since normalized.  TTE obtained with an EF of 51-55%with severe pulmonary HTN.   Troponin ordered, result within normal limits, 0.029 on 10/2  EKG from 10/2 revealed paced rhythm with occasional junctional beats, reviewed personally with Dr. Janneth ORTIZ on suspected Stage 4 CKD:   BUN- 25 Creatinine- 1.91 today will continue to monitor   Repeat labs in the AM   Monitor urine input/output   Avoid nephrotoxic medication     Acute Urinary Tract Infection/ Dysuria  Finish rocephin today  Patient states dysuria resolved    Right knee pain:   Pt seen by orthopedics, no acute Fx.   Continue supportive treatment.     Moderate acute superior endplate vertebral L1 compression fracture:   Continue supportive treatment.      Chronic compression fracture deformities of inferior endplates L3 and L4:   Continue supportive treatment.      Essential HTN: Home metoprolol and hydralazine previously restarted at reduced doses.  Hydralazine increased to 50 mg on 09/28  Continue to monitor and adjust as needed.      Confusion:   CT head on 9/8 negative for any acute abnormalities.  Pt alert and oriented at time of exam.   Cont supportive treatment.      Paroxysmal Afib:   NSR on exam today  Attending discussed risk/benefit of continuing anticoagulation with Pt and family, decision made to hold Coumadin at this time.  Continue to monitor      Generalized anxiety and depression: Cont Lexapro.      Generalized weakness: PT/OT  -----------  -DVT prophylaxis: Heparin SubQ  -Disposition plans/anticipated  needs:SNF        The patient is high risk due to the following diagnoses/reasons:  History of fall, anemia, CHF, CKD        Ankit Tai PA-C  10/03/22  09:04 EDT

## 2022-10-03 NOTE — THERAPY TREATMENT NOTE
Acute Care - Physical Therapy Treatment Note   [place]     Patient Name: Tre Kinney  : 1932  MRN: [id number]  Today's Date: 10/3/2022   Onset of Illness/Injury or Date of Surgery: 22  Visit Dx:     ICD-10-CM ICD-9-CM   1. Anemia, unspecified type  D64.9 285.9   2. Acute UTI  N39.0 599.0   3. Weakness  R53.1 780.79     Patient Active Problem List   Diagnosis   • Acid reflux   • Ankle arthralgia   • Cardiac conduction disorder   • Closed fracture of distal fibula   • Arteriosclerosis of coronary artery   • Fracture of distal end of tibia   • Elevated cholesterol   • BP (high blood pressure)   • Hypertrophic polyarthritis   • OP (osteoporosis)   • Right heart failure (HCC)   • Heart failure, chronic, right-sided (HCC)   • SOB (shortness of breath)   • Palpitations   • CHF (congestive heart failure) (HCC)   • Congestive heart failure (HCC)   • Precordial pain   • Chronic anticoagulation   • Hypokalemia   • Anemia, unspecified type     Past Medical History:   Diagnosis Date   • Anemia    • Arthritis    • CHF (congestive heart failure) (HCC)    • Chronic kidney disease    • Coronary artery disease    • H/O blood clots    • History of transfusion     no reaction    • Hypertension      Past Surgical History:   Procedure Laterality Date   • ABDOMINAL SURGERY     • APPENDECTOMY     • CARDIAC SURGERY     •  SECTION     • COLONOSCOPY N/A 3/7/2018    Procedure: COLONOSCOPY;  Surgeon: Willie Gresham MD;  Location:  [facility] ENDOSCOPY;  Service:    • CORONARY ANGIOPLASTY WITH STENT PLACEMENT     • ENDOSCOPY N/A 3/6/2018    Procedure: ESOPHAGOGASTRODUODENOSCOPY;  Surgeon: Willie Gresham MD;  Location:  [facility] ENDOSCOPY;  Service:    • PACEMAKER IMPLANTATION      left side    • SKIN BIOPSY     • TUBAL ABDOMINAL LIGATION       PT Assessment (last 12 hours)     PT Evaluation and Treatment     Row Name 10/03/22 1600          Physical Therapy Time and Intention    Subjective Information weakness  in   -      Document Type therapy note (daily note)  -     Mode of Treatment physical therapy  -     Patient Effort good  -     Symptoms Noted During/After Treatment fatigue;other (see comments)  in LE.  Pt reported some pain in L LE with WB  -     Comment Pt was cooperative and pleasant.  -     Row Name 10/03/22 1600          General Information    Patient Profile Reviewed yes  -     Patient Observations alert;cooperative;agree to therapy  -     Existing Precautions/Restrictions fall;TLSO  -     Row Name 10/03/22 1600          Pain    Pre/Posttreatment Pain Comment Pt did not rate pain but did report some pain in L LE in standing with decresaed WB on L side.  -     Row Name 10/03/22 1600          Bed Mobility    Bed Mobility sit-supine;supine-sit;scooting/bridging;rolling left;rolling right  -     Rolling Left Robeson (Bed Mobility) maximum assist (25% patient effort);1 person assist;verbal cues  -     Rolling Right Rob[place] (Bed Mobility) maximum assist (25% patient effort);1 person assist;verbal cues  -     Scooting/Bridging Robeson (Bed Mobility) 1 person assist;verbal cues;minimum assist (75% patient effort);other (see comments)  to scoot more to EOB.  Dep for scooting up higher once supine.  -     Supine-Sit Robeson (Bed Mobility) moderate assist (50% patient effort);verbal cues;2 person assist  -     Sit-Supine Robeson (Bed Mobility) moderate assist (50% patient effort);2 person assist;verbal cues  -     Bed Mobility, Safety Issues decreased use of arms for pushing/pulling;decreased use of legs for bridging/pushing;impaired trunk control for bed mobility  -     Assistive Device (Bed Mobility) bed rails;draw sheet  -     Row Name 10/03/22 1600          Transfers    Sit-Stand Robeson (Transfers) maximum assist (25% patient effort);moderate assist (50% patient effort);2 person assist;verbal cues  -     Stand-Sit Robeson (Transfers) moderate assist (50%  patient effort);maximum assist (25% patient effort);2 person assist;verbal cues  -     Row Name 10/03/22 1600          Sit-Stand Transfer    Assistive Device (Sit-Stand Transfers) walker, front-wheeled  -     Comment, (Sit-Stand Transfer) 4x  MaxAx2 initially progressing to modAx2 and improved posture with education and cues  -     Row Name 10/03/22 1600          Stand-Sit Transfer    Assistive Device (Stand-Sit Transfers) walker, front-wheeled  -     Comment, (Stand-Sit Transfer) 4x  -     Row Name 10/03/22 1600          Gait/Stairs (Locomotion)    Comment, (Gait/Stairs) Pt presents with fear of falling and poor endurance with inability to take steps today.  -     Row Name 10/03/22 1600          Balance    Balance Assessment standing static balance  -     Static Sitting Balance contact guard;1-person assist  -     Position, Sitting Balance sitting edge of bed  -     Static Standing Balance moderate assist;2-person assist;verbal cues  -     Position/Device Used, Standing Balance walker, front-wheeled  -     Row Name 10/03/22 1600          Plan of Care Review    Plan of Care Reviewed With patient  -     Outcome Evaluation PT treatment complete.  TLSO was donned supine in bed prior to mobility.  Pt required mod to maxAx2 for mobility.  She presents with a fear of falling and required education and cues to improve transfers and posture in static standing.  Performed 4x.  She fatigues quickly and reports weakness in bilateral LE and pain in left LE with decreased weight bearing in standing.  Pt would benefit from continued inpatient therapy upon d/c.  Subacute/SNF vs inpatient rehab.  -     Row Name 10/03/22 1600          Positioning and Restraints    Pre-Treatment Position in bed  -     Post Treatment Position bed  -     In Bed supine;call light within reach;encouraged to call for assist;with nsg;side rails up x3  Lines in tact and needs in reach  -           User Key  (r) = Recorded By,  (t) = Taken By, (c) = Cosigned By    Initials Name Provider Type    Martina Davis PT Physical Therapist                  PT Recommendation and Plan  Anticipated Discharge Disposition (PT): inpatient rehabilitation facility, other (see comments) (SNF vs acute inpatient rehab)  Plan of Care Reviewed With: patient  Outcome Evaluation: PT treatment complete.  TLSO was donned supine in bed prior to mobility.  Pt required mod to maxAx2 for mobility.  She presents with a fear of falling and required education and cues to improve transfers and posture in static standing.  Performed 4x.  She fatigues quickly and reports weakness in bilateral LE and pain in left LE with decreased weight bearing in standing.  Pt would benefit from continued inpatient therapy upon d/c.  Subacute/SNF vs inpatient rehab.       Time Calculation:    PT Charges     Row Name 10/03/22 1612             Time Calculation    PT Received On 10/03/22  -      PT Goal Re-Cert Due Date 10/04/22  -              Timed Charges    59900 - PT Therapeutic Activity Minutes 20  -KP              Total Minutes    Timed Charges Total Minutes 20  -KP       Total Minutes 20  -KP            User Key  (r) = Recorded By, (t) = Taken By, (c) = Cosigned By    Initials Name Provider Type    Martina Davis PT Physical Therapist              Therapy Charges for Today     Code Description Service Date Service Provider Modifiers Qty    80978803254 HC PT THERAPEUTIC ACT EA 15 MIN 10/3/2022 Martina Blum PT GP 1          PT G-Codes  AM-PAC 6 Clicks Score (PT): 10    Martina Blum PT  10/3/2022

## 2022-10-03 NOTE — PROGRESS NOTES
"Palliative Care Daily Progress Note     S: Medical record reviewed, followed up with Primary RN Jennifer and Dr Lagunas regarding patient's condition. When palliative care entered the room the pt was sleeping and did not appear to be in distress. I will touch base with family.      O:   Palliative Performance Scale Score:     /68 (BP Location: Left arm, Patient Position: Lying)   Pulse 73   Temp 98.1 °F (36.7 °C) (Oral)   Resp 18   Ht 170.2 cm (67\")   Wt 79.2 kg (174 lb 8 oz)   SpO2 92%   BMI 27.33 kg/m²     Intake/Output Summary (Last 24 hours) at 10/3/2022 1643  Last data filed at 10/3/2022 0700  Gross per 24 hour   Intake 610 ml   Output 3250 ml   Net -2640 ml       PE:  Pt was sleeping, I did not wake her at this time as she typically has difficulty sleeping through the night. She did not appear to be in any distress at this time.                                              Meds: Reviewed and changes noted.    Labs:   Results from last 7 days   Lab Units 10/03/22  0304   WBC 10*3/mm3 5.98   HEMOGLOBIN g/dL 9.1*   HEMATOCRIT % 29.6*   PLATELETS 10*3/mm3 238     Results from last 7 days   Lab Units 10/03/22  0304   SODIUM mmol/L 138   POTASSIUM mmol/L 3.8   CHLORIDE mmol/L 101   CO2 mmol/L 23.6   BUN mg/dL 25*   CREATININE mg/dL 1.91*   GLUCOSE mg/dL 108*   CALCIUM mg/dL 9.5     Results from last 7 days   Lab Units 10/03/22  0304   SODIUM mmol/L 138   POTASSIUM mmol/L 3.8   CHLORIDE mmol/L 101   CO2 mmol/L 23.6   BUN mg/dL 25*   CREATININE mg/dL 1.91*   CALCIUM mg/dL 9.5   BILIRUBIN mg/dL 0.8   ALK PHOS U/L 66   ALT (SGPT) U/L 8   AST (SGOT) U/L 14   GLUCOSE mg/dL 108*     Imaging Results (Last 72 Hours)     Procedure Component Value Units Date/Time    XR Chest 1 View [[id number]] Collected: 10/01/22 1403     Updated: 10/01/22 1405    Narrative:      CR Chest 1 Vw    INDICATION:   Worsening dyspnea.     COMPARISON:    Chest 9/29/2022    FINDINGS:  Portable AP view(s) of the chest.    Persistent " bibasilar atelectasis/infiltrate. No large pleural effusions. Cardiomegaly stable. No pneumothorax. Left-sided pacing complex.       Impression:      No change from 9/29/2022.    Signer Name: Marvin Edward MD   Signed: 10/1/2022 2:03 PM   Workstation Name: [email or web address]    [facility]            Diagnostics: Reviewed    A: Tre Kinney is a 89 y.o. female  admitted on 9/19/2022 due to a fall. She has a medical history CHF, chronic kidney disease, coronary artery disease s/p stenting, history of blood clots, chronic anticoagulation with Coumadin, SSS s/p pacemaker placement, chronic diastolic CHF, and iron deficiency anemia who presents today with a chief complaint of a fall. Per pt she has become progressively more weak and has experienced several falls. Pt has sustained a moderate acute superior endplate vertebral compression fracture at the L1 with about 40% loss of vertebral body height and mild posterior osseous retropulsion of about 6 mm and also noted to have chronic compression fracture deformities involving the inferior endplates of L3 and L4 due to her first fall and from her second fall she does have a large hematoma on her right side, which led her to this admission. Pt had a hemoglobin of 6.1 for which she received 2 units of PRBC's. Pt denies any known source of bleeding and does say she had a scope(EGD colonoscopy at [facility] in 2018.  Today 10/3/2022  T 98.1, HR 73, RR 18, and BP of 152/68 and was saturating 96% on room air.    P:  I was able to have a lengthy conversation with Pts son Leno and his wife on the phone today. They stated that they are concerned that Tre may have an injury to her left ankle/leg as she did not work well with PT today. She also tells her family that she has not been eating well despite telling the PA this morning that she has improved appetite. I let them know that I would speak with Dr Lagunas regarding this. Dr Lagunas stated that she would order  xray's on the left lower extremity. I also told Leno that I would speak with Tre in the am and discuss with her if she wants to get better that she is going to have to be motivated to do so. I let family know that I would call them tomorrow after conversation.    We will continue to follow along. Please do not hesitate to contact us regarding further sx mgmt or GOC needs, including after hours or on weekends via our on call provider at [phone].     Liyah Jaquez, APRN    10/3/2022

## 2022-10-03 NOTE — CASE MANAGEMENT/SOCIAL WORK
Discharge Planning [facility]     Patient Name: Tre Kinney  MRN: [id number]  Today's Date: 10/3/2022    Admit Date: 9/19/2022    Plan: SS discussed pt with PTBhargavi earlier this date and she voices plans to assess pt today. SS to follow.     Discharge Plan     Row Name 10/03/22 1516       Plan    Plan SS discussed pt with PTBhargavi earlier this date and she voices plans to assess pt today. SS to follow.              Continued Care and Services - Admitted Since 9/19/2022     Destination     Service Provider Request Status Selected Services Address Phone Fax Patient Preferred    [facility] [place]  [facility] - Request Sent N/A [address] [facility] [address] [phone] --    [facility]  Declined  Bed not available N/A [address] [facility] [address] [phone] --    [facility]  Declined  Bed not available N/A [address] [facility] [address] [phone] --              Expected Discharge Date and Time     Expected Discharge Date Expected Discharge Time    Oct 4, 2022         LENORE Koch

## 2022-10-03 NOTE — PLAN OF CARE
Goal Outcome Evaluation:           Progress: no change  Outcome Evaluation: pt has rested in bed today, has had hardly any urine output, has had 200mls on both bladder scan ocassions, pt got up with PT and had to sit back down, pt has had no complaints, no acute changes, will continue plan of care.

## 2022-10-04 LAB
ABSOLUTE LUNG FLUID CONTENT: 28 % (ref 20–35)
ALBUMIN SERPL-MCNC: 2.99 G/DL (ref 3.5–5.2)
ALBUMIN/GLOB SERPL: 1 G/DL
ALP SERPL-CCNC: 64 U/L (ref 39–117)
ALT SERPL W P-5'-P-CCNC: 7 U/L (ref 1–33)
ANION GAP SERPL CALCULATED.3IONS-SCNC: 12 MMOL/L (ref 5–15)
AST SERPL-CCNC: 13 U/L (ref 1–32)
BILIRUB SERPL-MCNC: 0.6 MG/DL (ref 0–1.2)
BUN SERPL-MCNC: 23 MG/DL (ref 8–23)
BUN/CREAT SERPL: 12 (ref 7–25)
CALCIUM SPEC-SCNC: 9.1 MG/DL (ref 8.6–10.5)
CHLORIDE SERPL-SCNC: 102 MMOL/L (ref 98–107)
CO2 SERPL-SCNC: 25 MMOL/L (ref 22–29)
CREAT SERPL-MCNC: 1.92 MG/DL (ref 0.57–1)
DEPRECATED RDW RBC AUTO: 72.6 FL (ref 37–54)
EGFRCR SERPLBLD CKD-EPI 2021: 24.7 ML/MIN/1.73
ERYTHROCYTE [DISTWIDTH] IN BLOOD BY AUTOMATED COUNT: 20.6 % (ref 12.3–15.4)
GLOBULIN UR ELPH-MCNC: 3.1 GM/DL
GLUCOSE SERPL-MCNC: 107 MG/DL (ref 65–99)
HCT VFR BLD AUTO: 28.7 % (ref 34–46.6)
HGB BLD-MCNC: 8.9 G/DL (ref 12–15.9)
MAGNESIUM SERPL-MCNC: 2 MG/DL (ref 1.6–2.4)
MCH RBC QN AUTO: 29.9 PG (ref 26.6–33)
MCHC RBC AUTO-ENTMCNC: 31 G/DL (ref 31.5–35.7)
MCV RBC AUTO: 96.3 FL (ref 79–97)
PHOSPHATE SERPL-MCNC: 3.8 MG/DL (ref 2.5–4.5)
PLATELET # BLD AUTO: 250 10*3/MM3 (ref 140–450)
PMV BLD AUTO: 9.5 FL (ref 6–12)
POTASSIUM SERPL-SCNC: 3.6 MMOL/L (ref 3.5–5.2)
PROT SERPL-MCNC: 6.1 G/DL (ref 6–8.5)
RBC # BLD AUTO: 2.98 10*6/MM3 (ref 3.77–5.28)
SODIUM SERPL-SCNC: 139 MMOL/L (ref 136–145)
WBC NRBC COR # BLD: 5.82 10*3/MM3 (ref 3.4–10.8)

## 2022-10-04 PROCEDURE — 80053 COMPREHEN METABOLIC PANEL: CPT

## 2022-10-04 PROCEDURE — 85027 COMPLETE CBC AUTOMATED: CPT

## 2022-10-04 PROCEDURE — 94761 N-INVAS EAR/PLS OXIMETRY MLT: CPT

## 2022-10-04 PROCEDURE — 83735 ASSAY OF MAGNESIUM: CPT | Performed by: INTERNAL MEDICINE

## 2022-10-04 PROCEDURE — 97535 SELF CARE MNGMENT TRAINING: CPT

## 2022-10-04 PROCEDURE — 84100 ASSAY OF PHOSPHORUS: CPT | Performed by: INTERNAL MEDICINE

## 2022-10-04 PROCEDURE — 94799 UNLISTED PULMONARY SVC/PX: CPT

## 2022-10-04 PROCEDURE — 99232 SBSQ HOSP IP/OBS MODERATE 35: CPT

## 2022-10-04 PROCEDURE — 97110 THERAPEUTIC EXERCISES: CPT

## 2022-10-04 PROCEDURE — 94726 PLETHYSMOGRAPHY LUNG VOLUMES: CPT

## 2022-10-04 PROCEDURE — 25010000002 HEPARIN (PORCINE) PER 1000 UNITS: Performed by: INTERNAL MEDICINE

## 2022-10-04 PROCEDURE — 97116 GAIT TRAINING THERAPY: CPT

## 2022-10-04 PROCEDURE — 97530 THERAPEUTIC ACTIVITIES: CPT

## 2022-10-04 RX ORDER — PROCHLORPERAZINE EDISYLATE 5 MG/ML
5 INJECTION INTRAMUSCULAR; INTRAVENOUS EVERY 6 HOURS PRN
Status: DISCONTINUED | OUTPATIENT
Start: 2022-10-04 | End: 2022-10-05 | Stop reason: HOSPADM

## 2022-10-04 RX ORDER — PROCHLORPERAZINE 25 MG
25 SUPPOSITORY, RECTAL RECTAL EVERY 12 HOURS PRN
Status: DISCONTINUED | OUTPATIENT
Start: 2022-10-04 | End: 2022-10-05 | Stop reason: HOSPADM

## 2022-10-04 RX ORDER — FUROSEMIDE 40 MG/1
40 TABLET ORAL
Status: DISCONTINUED | OUTPATIENT
Start: 2022-10-04 | End: 2022-10-05 | Stop reason: HOSPADM

## 2022-10-04 RX ORDER — PROCHLORPERAZINE MALEATE 5 MG/1
5 TABLET ORAL EVERY 6 HOURS PRN
Status: DISCONTINUED | OUTPATIENT
Start: 2022-10-04 | End: 2022-10-05 | Stop reason: HOSPADM

## 2022-10-04 RX ORDER — ONDANSETRON 2 MG/ML
4 INJECTION INTRAMUSCULAR; INTRAVENOUS EVERY 6 HOURS PRN
Status: DISCONTINUED | OUTPATIENT
Start: 2022-10-04 | End: 2022-10-05 | Stop reason: HOSPADM

## 2022-10-04 RX ADMIN — FUROSEMIDE 40 MG: 40 TABLET ORAL at 17:05

## 2022-10-04 RX ADMIN — HEPARIN SODIUM 5000 UNITS: 5000 INJECTION INTRAVENOUS; SUBCUTANEOUS at 08:25

## 2022-10-04 RX ADMIN — IPRATROPIUM BROMIDE AND ALBUTEROL SULFATE 3 ML: .5; 2.5 SOLUTION RESPIRATORY (INHALATION) at 18:45

## 2022-10-04 RX ADMIN — HYDRALAZINE HYDROCHLORIDE 50 MG: 50 TABLET, FILM COATED ORAL at 14:06

## 2022-10-04 RX ADMIN — Medication 10 ML: at 21:01

## 2022-10-04 RX ADMIN — ESCITALOPRAM 10 MG: 10 TABLET, FILM COATED ORAL at 08:24

## 2022-10-04 RX ADMIN — PANTOPRAZOLE SODIUM 40 MG: 40 TABLET, DELAYED RELEASE ORAL at 06:36

## 2022-10-04 RX ADMIN — HEPARIN SODIUM 5000 UNITS: 5000 INJECTION INTRAVENOUS; SUBCUTANEOUS at 21:01

## 2022-10-04 RX ADMIN — METOPROLOL TARTRATE 12.5 MG: 25 TABLET, FILM COATED ORAL at 21:01

## 2022-10-04 RX ADMIN — Medication 10 ML: at 08:27

## 2022-10-04 RX ADMIN — IPRATROPIUM BROMIDE AND ALBUTEROL SULFATE 3 ML: .5; 2.5 SOLUTION RESPIRATORY (INHALATION) at 06:46

## 2022-10-04 RX ADMIN — IPRATROPIUM BROMIDE AND ALBUTEROL SULFATE 3 ML: .5; 2.5 SOLUTION RESPIRATORY (INHALATION) at 13:24

## 2022-10-04 RX ADMIN — Medication 10 MG: at 21:01

## 2022-10-04 RX ADMIN — HYDRALAZINE HYDROCHLORIDE 50 MG: 50 TABLET, FILM COATED ORAL at 22:05

## 2022-10-04 RX ADMIN — HYDRALAZINE HYDROCHLORIDE 50 MG: 50 TABLET, FILM COATED ORAL at 06:35

## 2022-10-04 RX ADMIN — METOPROLOL TARTRATE 12.5 MG: 25 TABLET, FILM COATED ORAL at 08:24

## 2022-10-04 RX ADMIN — FUROSEMIDE 40 MG: 40 TABLET ORAL at 08:24

## 2022-10-04 NOTE — NURSING NOTE
"Pt has been bladder scanned x2 this shift. 874ml noted at 0023, pt instructed to try to urinate, voided 350ml in external catheter canister. Pt bladder scanned at 0620, 940ml noted. Pt instructed to try to urinate, pt voided 200ml at this time. Pt was educated on importance of straight cath. Pt refused to be straight cathed at this time. Pt states she has no pain, pressure in her abdomen at this time. Pt states she has been cathed multiple times a day and wishes \"it would just be left in\". MIKAL Martinez notified on pt refusal. Will monitor.  "

## 2022-10-04 NOTE — THERAPY TREATMENT NOTE
Acute Care - Occupational Therapy Treatment Note   [facility]     Patient Name: Tre Kinney  : 1932  MRN: [id number]  Today's Date: 10/4/2022  Onset of Illness/Injury or Date of Surgery: 22     Referring Physician: Verito    Admit Date: 2022       ICD-10-CM ICD-9-CM   1. Anemia, unspecified type  D64.9 285.9   2. Acute UTI  N39.0 599.0   3. Weakness  R53.1 780.79     Patient Active Problem List   Diagnosis   • Acid reflux   • Ankle arthralgia   • Cardiac conduction disorder   • Closed fracture of distal fibula   • Arteriosclerosis of coronary artery   • Fracture of distal end of tibia   • Elevated cholesterol   • BP (high blood pressure)   • Hypertrophic polyarthritis   • OP (osteoporosis)   • Right heart failure (HCC)   • Heart failure, chronic, right-sided (HCC)   • SOB (shortness of breath)   • Palpitations   • CHF (congestive heart failure) (HCC)   • Congestive heart failure (HCC)   • Precordial pain   • Chronic anticoagulation   • Hypokalemia   • Anemia, unspecified type     Past Medical History:   Diagnosis Date   • Anemia    • Arthritis    • CHF (congestive heart failure) (HCC)    • Chronic kidney disease    • Coronary artery disease    • H/O blood clots    • History of transfusion     no reaction    • Hypertension      Past Surgical History:   Procedure Laterality Date   • ABDOMINAL SURGERY     • APPENDECTOMY     • CARDIAC SURGERY     •  SECTION     • COLONOSCOPY N/A 3/7/2018    Procedure: COLONOSCOPY;  Surgeon: Willie Gresham MD;  Location: [facility] ENDOSCOPY;  Service:    • CORONARY ANGIOPLASTY WITH STENT PLACEMENT     • ENDOSCOPY N/A 3/6/2018    Procedure: ESOPHAGOGASTRODUODENOSCOPY;  Surgeon: Willie Gresham MD;  Location:  [facility] ENDOSCOPY;  Service:    • PACEMAKER IMPLANTATION      left side    • SKIN BIOPSY     • TUBAL ABDOMINAL LIGATION           OT ASSESSMENT FLOWSHEET (last 12 hours)     OT Evaluation and Treatment     Row Name 10/04/22 1125                   OT Time and  Intention    Subjective Information complains of;weakness;fatigue  -LM        Document Type therapy note (daily note)  -LM        Mode of Treatment occupational therapy  -LM        Patient Effort good  -LM        Comment Patient seen this date for adl retraining/education, fxl mobility, bed mobility, toileting.  Patient required mod/max assist with bed mobility, mod assist x 2 for sit to stand with rw, total assist with toileting hygiene.  -LM                  General Information    Existing Precautions/Restrictions fall;LSO  -LM                  Cognition    Affect/Mental Status (Cognition) WFL  -LM        Orientation Status (Cognition) oriented x 3  -LM                  Positioning and Restraints    Post Treatment Position bed  -LM        In Bed call light within reach;encouraged to call for assist  -LM              User Key  (r) = Recorded By, (t) = Taken By, (c) = Cosigned By    Initials Name Effective Dates    LM Татьяна Bah OT 06/16/21 -                        OT Recommendation and Plan              Time Calculation:     Therapy Charges for Today     Code Description Service Date Service Provider Modifiers Qty    92639717572 HC OT SELF CARE/MGMT/TRAIN EA 15 MIN 10/4/2022 Татьяна Bah OT GO 2               Татьяна Bah OT  10/4/2022

## 2022-10-04 NOTE — PROGRESS NOTES
Patient Identification:  Name:  Tre Kinney  Age:  89 y.o.  Sex:  female  :  1932  MRN:  [id number]  Visit Number:  [id number]  Primary Care Provider:  Jesscia Pedro APRN    Length of stay:  15    Subjective/Interval History/Consultants/Procedures     Chief Complaint:   Chief Complaint   Patient presents with   • Fall       Subjective/Interval History:    89 y.o. female who was admitted on 2022 with anemia.    PMH is significant for chronic diastolic congestive heart failure, paroxysmal atrial fibrillation, CKD, hypertension.  For complete admission information, please see history and physical.     Consultations:  Orthopedics, surgery, palliative, nephrology    Procedures/Scans:  CT lower extremity right without contrast-2022  CT abdomen and pelvis without contrast-2022    Today, the patient was seen and examined with SONAM Chase.  She states she is not doing very well this morning.  She has not eaten her breakfast this morning as she states she does not have an appetite.  She states that she did not eat much after breakfast yesterday either.  She states she is having some nausea this morning.  We will add some Zofran. she states that her breathing has worsened overnight but is not as bad as it had been last week.  She states she does not feel short of breath at this time.  Denies any chest pain or heart palpitations overnight.  Is still having some cough.    Ms. Kinney states that her visit from therapy yesterday did not go well.  She states that she is just too weak to stand much on her own.  She denies that she is having any musculoskeletal pain in her left lower extremity.  She states that she has trouble standing and ambulating because she is weak.    Patient is tender to palpation on her lower abdomen.  Discussed with her need for straight cath to alleviate the discomfort.  She was in agreement at this time.    Discussed with AM SONAM Chase with no acute events  overnight.  ----------------------------------------------------------------------------------------------------------------------  Current Hospital Meds:  docusate sodium, 100 mg, Oral, BID  escitalopram, 10 mg, Oral, Daily  furosemide, 40 mg, Oral, Daily  gabapentin, 300 mg, Oral, Nightly  heparin (porcine), 5,000 Units, Subcutaneous, Q12H  hydrALAZINE, 50 mg, Oral, Q8H  ipratropium-albuterol, 3 mL, Nebulization, 4x Daily - RT  melatonin, 10 mg, Oral, Nightly  metoprolol tartrate, 12.5 mg, Oral, Q12H  pantoprazole, 40 mg, Oral, Q AM  sodium chloride, 10 mL, Intravenous, Q12H  sodium chloride, 10 mL, Intravenous, Q12H         ----------------------------------------------------------------------------------------------------------------------      Objective     Vital Signs:  Temp:  [97.8 °F (36.6 °C)-98.7 °F (37.1 °C)] 98.1 °F (36.7 °C)  Heart Rate:  [] 99  Resp:  [16-20] 18  BP: (118-194)/(56-86) 194/86      10/02/22  0600 10/03/22  0500 10/04/22  0300   Weight: 80.3 kg (177 lb) 79.2 kg (174 lb 8 oz) 77.5 kg (170 lb 14.4 oz)     Body mass index is 26.77 kg/m².    Intake/Output Summary (Last 24 hours) at 10/4/2022 0716  Last data filed at 10/4/2022 0300  Gross per 24 hour   Intake 120 ml   Output 740 ml   Net -620 ml     No intake/output data recorded.  Diet Soft Texture; Chopped; Thin  ----------------------------------------------------------------------------------------------------------------------    Physical Exam  Vitals and nursing note reviewed.   Constitutional:       Appearance: She is ill-appearing.   HENT:      Head: Normocephalic and atraumatic.   Eyes:      Pupils: Pupils are equal, round, and reactive to light.   Cardiovascular:      Rate and Rhythm: Normal rate and regular rhythm.      Heart sounds: Normal heart sounds.   Pulmonary:      Effort: Pulmonary effort is normal.      Breath sounds: Wheezing present.   Abdominal:      General: Bowel sounds are normal.      Palpations: Abdomen is  soft.      Tenderness: There is abdominal tenderness.      Comments: Lower abdominal tenderness over the bladder   Musculoskeletal:      Cervical back: Normal range of motion.      Right lower leg: Edema present.      Left lower leg: Edema present.      Comments: Trace edema   Skin:     General: Skin is warm and dry.   Neurological:      Mental Status: She is alert.                ----------------------------------------------------------------------------------------------------------------------    ----------------------------------------------------------------------------------------------------------------------  Results from last 7 days   Lab Units 10/02/22  0848 10/01/22  1246   TROPONIN T ng/mL 0.029  --    PROBNP pg/mL  --  27,968.0*     Results from last 7 days   Lab Units 10/04/22  0116 10/03/22  0304 10/02/22  0848 09/29/22  2349 09/29/22  2317   LACTATE mmol/L  --   --   --   --  1.3   WBC 10*3/mm3 5.82 5.98 6.32   < >  --    HEMOGLOBIN g/dL 8.9* 9.1* 8.7*   < >  --    HEMATOCRIT % 28.7* 29.6* 28.3*   < >  --    MCV fL 96.3 96.1 97.6*   < >  --    MCHC g/dL 31.0* 30.7* 30.7*   < >  --    PLATELETS 10*3/mm3 250 238 239   < >  --     < > = values in this interval not displayed.         Results from last 7 days   Lab Units 10/04/22  0116 10/03/22  0304 10/02/22  0848   SODIUM mmol/L 139 138 138   POTASSIUM mmol/L 3.6 3.8 4.1   MAGNESIUM mg/dL 2.0  --   --    CHLORIDE mmol/L 102 101 100   CO2 mmol/L 25.0 23.6 24.1   BUN mg/dL 23 25* 27*   CREATININE mg/dL 1.92* 1.91* 1.91*   CALCIUM mg/dL 9.1 9.5 9.2   GLUCOSE mg/dL 107* 108* 106*   ALBUMIN g/dL 2.99* 3.10* 3.24*   BILIRUBIN mg/dL 0.6 0.8 0.8   ALK PHOS U/L 64 66 67   AST (SGOT) U/L 13 14 14   ALT (SGPT) U/L 7 8 8   Estimated Creatinine Clearance: 21.3 mL/min (A) (by C-G formula based on SCr of 1.92 mg/dL (H)).  No results found for: AMMONIA      Blood Culture   Date Value Ref Range Status   09/29/2022 No growth at 4 days  Preliminary     Urine Culture    Date Value Ref Range Status   09/29/2022 No growth  Final     No results found for: WOUNDCX  No results found for: STOOLCX  ----------------------------------------------------------------------------------------------------------------------  Imaging Results (Last 24 Hours)     ** No results found for the last 24 hours. **        ----------------------------------------------------------------------------------------------------------------------   I have reviewed the above laboratory values for 10/04/22    Assessment/Plan     Active Hospital Problems    Diagnosis  POA   • **Anemia, unspecified type [D64.9]  Yes         ASSESSMENT/PLAN:      Traumatic Right Gluteal Hematoma  Acute blood loss anemia:   Held Coumadin on admission.   Received Vit K and Kcentra in ED due to supratherapeutic INR  Please see CT imaging.  H&H  8.9 and 28.7 and today, respectively  S/p 3 units PRBC   Continue to monitor   AM labs     Chronic diastolic CHF  Worsening Dyspnea  Change Lasix to 40 twice daily  One-time Bumex  ReDs vest elevated at 37, plan to repeat and adjust tx as indicated  Echo as above.   Continue to monitor volume status  Dyspnea worsened per patient today       Chest pain   elevated troponin   Likely type II/demand ischemia in the setting of acute blood loss anemia.   Trended troponin, overall flat trend and has since normalized.  TTE obtained with an EF of 51-55%with severe pulmonary HTN.   Troponin ordered, result within normal limits, 0.029 on 10/2  EKG from 10/2 revealed paced rhythm with occasional junctional beats, reviewed personally with Dr. Lagunas        DIANA on suspected Stage 4 CKD:   BUN-23 creatinine-1.92 today will continue to monitor   Repeat labs in the AM   Monitor urine input/output   Avoid nephrotoxic medication     Acute Urinary Tract Infection/ Dysuria  Finished Rocephin on 10/3  Patient states dysuria resolved     Right knee pain:   Pt seen by orthopedics, no acute Fx.   Continue supportive  treatment.     Moderate acute superior endplate vertebral L1 compression fracture:   Continue supportive treatment.      Chronic compression fracture deformities of inferior endplates L3 and L4:   Continue supportive treatment.      Essential HTN: Home metoprolol and hydralazine previously restarted at reduced doses.  Hydralazine increased to 50 mg on 09/28  BP had been elevated yesterday evening at 194/86.  Discussed with attending Dr. Lagunas and agreed we will continue to monitor and make any adjustments as indicated.  No change to meds at this time.     Confusion:   CT head on 9/8 negative for any acute abnormalities.  Pt alert and oriented at time of exam.   Cont supportive treatment.      Paroxysmal Afib:   NSR on exam today  Attending discussed risk/benefit of continuing anticoagulation with Pt and family, decision made to hold Coumadin at this time.  Continue to monitor      Generalized anxiety and depression: Cont Lexapro.      Generalized weakness: PT/OT-----------      -DVT prophylaxis: Subcu heparin  -Disposition plans/anticipated needs: SNF        The patient is high risk due to the following diagnoses/reasons: Anemia, CHF, CKD, hypertension, atrial fibrillation.        Ankit Tai PA-C  10/04/22  07:16 EDT

## 2022-10-04 NOTE — THERAPY TREATMENT NOTE
Acute Care - Physical Therapy Treatment Note   [place]     Patient Name: Tre Kinney  : 1932  MRN: [id number]  Today's Date: 10/4/2022   Onset of Illness/Injury or Date of Surgery: 22  Visit Dx:     ICD-10-CM ICD-9-CM   1. Anemia, unspecified type  D64.9 285.9   2. Acute UTI  N39.0 599.0   3. Weakness  R53.1 780.79     Patient Active Problem List   Diagnosis   • Acid reflux   • Ankle arthralgia   • Cardiac conduction disorder   • Closed fracture of distal fibula   • Arteriosclerosis of coronary artery   • Fracture of distal end of tibia   • Elevated cholesterol   • BP (high blood pressure)   • Hypertrophic polyarthritis   • OP (osteoporosis)   • Right heart failure (HCC)   • Heart failure, chronic, right-sided (HCC)   • SOB (shortness of breath)   • Palpitations   • CHF (congestive heart failure) (HCC)   • Congestive heart failure (HCC)   • Precordial pain   • Chronic anticoagulation   • Hypokalemia   • Anemia, unspecified type     Past Medical History:   Diagnosis Date   • Anemia    • Arthritis    • CHF (congestive heart failure) (HCC)    • Chronic kidney disease    • Coronary artery disease    • H/O blood clots    • History of transfusion     no reaction    • Hypertension      Past Surgical History:   Procedure Laterality Date   • ABDOMINAL SURGERY     • APPENDECTOMY     • CARDIAC SURGERY     •  SECTION     • COLONOSCOPY N/A 3/7/2018    Procedure: COLONOSCOPY;  Surgeon: Willie Gresham MD;  Location:  [facility];  Service:    • CORONARY ANGIOPLASTY WITH STENT PLACEMENT     • ENDOSCOPY N/A 3/6/2018    Procedure: ESOPHAGOGASTRODUODENOSCOPY;  Surgeon: Willie Gresham MD;  Location:  [facility];  Service:    • PACEMAKER IMPLANTATION      left side    • SKIN BIOPSY     • TUBAL ABDOMINAL LIGATION       PT Assessment (last 12 hours)     PT Evaluation and Treatment     Row Name 10/04/22 1357          Physical Therapy Time and Intention    Subjective Information complains of;weakness  -CT      Document Type therapy note (daily note)  -CT     Mode of Treatment physical therapy  -CT     Patient Effort good  -CT     Row Name 10/04/22 1350          General Information    Patient Profile Reviewed yes  -CT     Existing Precautions/Restrictions fall;TLSO  -CT     Row Name 10/04/22 1350          Cognition    Affect/Mental Status (Cognition) WFL  -CT     Orientation Status (Cognition) oriented x 3  -CT     Row Name 10/04/22 1350          Bed Mobility    Bed Mobility sit-supine;supine-sit;scooting/bridging;rolling left;rolling right  -CT     Rolling Left [place] (Bed Mobility) moderate assist (50% patient effort);2 person assist  -CT     Rolling Right [place] (Bed Mobility) moderate assist (50% patient effort);2 person assist  -CT     Scooting/Bridging [place] (Bed Mobility) moderate assist (50% patient effort)  -CT     Supine-Sit [place] (Bed Mobility) moderate assist (50% patient effort);2 person assist;verbal cues;nonverbal cues (demo/gesture)  -CT     Sit-Supine [place] (Bed Mobility) moderate assist (50% patient effort);2 person assist;verbal cues;nonverbal cues (demo/gesture)  -CT     Bed Mobility, Safety Issues decreased use of arms for pushing/pulling;decreased use of legs for bridging/pushing;impaired trunk control for bed mobility  -CT     Assistive Device (Bed Mobility) bed rails;draw sheet  -CT     Row Name 10/04/22 1350          Transfers    Sit-Stand [place] (Transfers) moderate assist (50% patient effort);2 person assist;verbal cues  -CT     Stand-Sit [place] (Transfers) moderate assist (50% patient effort);2 person assist;verbal cues  -CT     Row Name 10/04/22 1350          Sit-Stand Transfer    Assistive Device (Sit-Stand Transfers) walker, front-wheeled  -CT     Row Name 10/04/22 1350          Stand-Sit Transfer    Assistive Device (Stand-Sit Transfers) walker, front-wheeled  -CT     Row Name 10/04/22 1350          Gait/Stairs (Locomotion)    [place]  Level (Gait) moderate assist (50% patient effort);maximum assist (25% patient effort);2 person assist;verbal cues;nonverbal cues (demo/gesture)  -CT     Assistive Device (Gait) walker, front-wheeled  -CT     Distance in Feet (Gait) 2 side steps toward head of bed  -CT     Pattern (Gait) step-to  -CT     Deviations/Abnormal Patterns (Gait) gait speed decreased;festinating/shuffling;weight shifting decreased  -CT     Row Name 10/04/22 1359          Motor Skills    Therapeutic Exercise --  BLE sitting ther ex  -CT     Row Name 10/04/22 1357          Plan of Care Review    Plan of Care Reviewed With patient  -CT     Row Name 10/04/22 1350          Positioning and Restraints    Pre-Treatment Position in bed  -CT     Post Treatment Position bed  -CT     In Bed supine;call light within reach;encouraged to call for assist;exit alarm on  -CT           User Key  (r) = Recorded By, (t) = Taken By, (c) = Cosigned By    Initials Name Provider Type    CT Bhargavi Vásquez, EDEL Physical Therapist                  PT Recommendation and Plan  Anticipated Discharge Disposition (PT): inpatient rehabilitation facility, other (see comments) (SNF vs acute inpatient rehab)  Planned Therapy Interventions (PT): balance training, bed mobility training, gait training, home exercise program, manual therapy techniques, motor coordination training, neuromuscular re-education, patient/family education, postural re-education, strengthening, transfer training  Therapy Frequency (PT): 2 times/wk (2-5 times/wk)  Plan of Care Reviewed With: patient       Time Calculation:    PT Charges     Row Name 10/04/22 9866             Time Calculation    PT Received On 10/04/22  -CT              Time Calculation- PT    Total Timed Code Minutes- PT 40 minute(s)  -CT            User Key  (r) = Recorded By, (t) = Taken By, (c) = Cosigned By    Initials Name Provider Type    CT Bhargavi Vásquez PT Physical Therapist              Therapy Charges for Today     Code  Description Service Date Service Provider Modifiers Qty    39033648147  PT THERAPEUTIC ACT EA 15 MIN 10/4/2022 Bhargavi Vásquez, PT GP 1    99551974142 HC PT THER PROC EA 15 MIN 10/4/2022 Bhargavi Vásquez, PT GP 1    [id number]  GAIT TRAINING EA 15 MIN 10/4/2022 Bhargavi Vásquez, PT GP 1          PT G-Codes  AM-PAC 6 Clicks Score (PT): 10    Bhargavi Vásquez, PT  10/4/2022

## 2022-10-04 NOTE — PLAN OF CARE
Goal Outcome Evaluation:              Outcome Evaluation: Pt resting in bed at this time. Bladder scans >800, pt refusing straight cath. See nursing note. No complaints of pain or discomfort. Will continue POC.

## 2022-10-04 NOTE — CASE MANAGEMENT/SOCIAL WORK
Discharge Planning Assessment   [place]     Patient Name: Tre Kinney  MRN: [id number]  Today's Date: 10/4/2022    Admit Date: 9/19/2022    Plan: Pt was discussed in [facility] today. SS discussed pt with PT and rehab is recommended. SS spoke to pt and pt's sonLeno on this date. Pt and pt's son request a swing bed consult. SS notified Dr. Lagunas. SS to follow.     Discharge Plan     Row Name 10/04/22 1630       Plan    Plan Pt was discussed in [facility] today. SS discussed pt with PT and rehab is recommended. SS spoke to pt and pt's son, Leno on this date. Pt and pt's son request a swing bed consult. SS notified Dr. Lagunas. SS to follow.              Continued Care and Services - Admitted Since 9/19/2022     Destination     Service Provider Request Status Selected Services Address Phone Fax Patient Preferred    [facility]  [facility] - Request Sent N/A [email or web address] [facility] [place] [address] [phone] --    [facility]  Declined  Bed not available N/A [address] [facility] [place] [address] [phone] --    [facility]  Declined  Bed not available N/A [address] [facility] [place] [address] [phone] --              Expected Discharge Date and Time     Expected Discharge Date Expected Discharge Time    Oct 4, 2022         LENORE Koch

## 2022-10-04 NOTE — PLAN OF CARE
Goal Outcome Evaluation:           Progress: no change  Outcome Evaluation: Patient is resting in bed at this time, has worked with PT and OT, patient has been straight cath'd once this shift, patient voices no complaints or concerns this shift

## 2022-10-05 ENCOUNTER — HOSPITAL ENCOUNTER (INPATIENT)
Facility: HOSPITAL | Age: 87
LOS: 100 days | Discharge: HOME HEALTH | DRG: 948 | End: 2023-01-13
Attending: INTERNAL MEDICINE | Admitting: INTERNAL MEDICINE
Payer: MEDICARE

## 2022-10-05 VITALS
BODY MASS INDEX: 26.51 KG/M2 | DIASTOLIC BLOOD PRESSURE: 69 MMHG | HEIGHT: 67 IN | TEMPERATURE: 98 F | OXYGEN SATURATION: 97 % | RESPIRATION RATE: 18 BRPM | HEART RATE: 78 BPM | SYSTOLIC BLOOD PRESSURE: 170 MMHG | WEIGHT: 168.9 LBS

## 2022-10-05 DIAGNOSIS — R53.1 GENERALIZED WEAKNESS: Primary | ICD-10-CM

## 2022-10-05 LAB
ABSOLUTE LUNG FLUID CONTENT: 25 % (ref 20–35)
ALBUMIN SERPL-MCNC: 3.2 G/DL (ref 3.5–5.2)
ALBUMIN/GLOB SERPL: 1 G/DL
ALP SERPL-CCNC: 66 U/L (ref 39–117)
ALT SERPL W P-5'-P-CCNC: 6 U/L (ref 1–33)
ANION GAP SERPL CALCULATED.3IONS-SCNC: 14.9 MMOL/L (ref 5–15)
AST SERPL-CCNC: 11 U/L (ref 1–32)
BACTERIA SPEC AEROBE CULT: NORMAL
BILIRUB SERPL-MCNC: 0.8 MG/DL (ref 0–1.2)
BUN SERPL-MCNC: 21 MG/DL (ref 8–23)
BUN/CREAT SERPL: 11.1 (ref 7–25)
CALCIUM SPEC-SCNC: 9.3 MG/DL (ref 8.6–10.5)
CHLORIDE SERPL-SCNC: 98 MMOL/L (ref 98–107)
CO2 SERPL-SCNC: 24.1 MMOL/L (ref 22–29)
CREAT SERPL-MCNC: 1.9 MG/DL (ref 0.57–1)
DEPRECATED RDW RBC AUTO: 72.4 FL (ref 37–54)
EGFRCR SERPLBLD CKD-EPI 2021: 25 ML/MIN/1.73
ERYTHROCYTE [DISTWIDTH] IN BLOOD BY AUTOMATED COUNT: 20.9 % (ref 12.3–15.4)
GLOBULIN UR ELPH-MCNC: 3.1 GM/DL
GLUCOSE SERPL-MCNC: 101 MG/DL (ref 65–99)
HCT VFR BLD AUTO: 30.8 % (ref 34–46.6)
HGB BLD-MCNC: 9.5 G/DL (ref 12–15.9)
MAGNESIUM SERPL-MCNC: 2 MG/DL (ref 1.6–2.4)
MCH RBC QN AUTO: 29.5 PG (ref 26.6–33)
MCHC RBC AUTO-ENTMCNC: 30.8 G/DL (ref 31.5–35.7)
MCV RBC AUTO: 95.7 FL (ref 79–97)
PHOSPHATE SERPL-MCNC: 3.5 MG/DL (ref 2.5–4.5)
PLATELET # BLD AUTO: 247 10*3/MM3 (ref 140–450)
PMV BLD AUTO: 9.8 FL (ref 6–12)
POTASSIUM SERPL-SCNC: 3.7 MMOL/L (ref 3.5–5.2)
PROT SERPL-MCNC: 6.3 G/DL (ref 6–8.5)
RBC # BLD AUTO: 3.22 10*6/MM3 (ref 3.77–5.28)
SODIUM SERPL-SCNC: 137 MMOL/L (ref 136–145)
WBC NRBC COR # BLD: 6.68 10*3/MM3 (ref 3.4–10.8)

## 2022-10-05 PROCEDURE — 94799 UNLISTED PULMONARY SVC/PX: CPT

## 2022-10-05 PROCEDURE — 99239 HOSP IP/OBS DSCHRG MGMT >30: CPT | Performed by: INTERNAL MEDICINE

## 2022-10-05 PROCEDURE — 25010000002 HEPARIN (PORCINE) PER 1000 UNITS: Performed by: INTERNAL MEDICINE

## 2022-10-05 PROCEDURE — 80053 COMPREHEN METABOLIC PANEL: CPT

## 2022-10-05 PROCEDURE — 94726 PLETHYSMOGRAPHY LUNG VOLUMES: CPT

## 2022-10-05 PROCEDURE — 94761 N-INVAS EAR/PLS OXIMETRY MLT: CPT

## 2022-10-05 PROCEDURE — 94664 DEMO&/EVAL PT USE INHALER: CPT

## 2022-10-05 PROCEDURE — 99305 1ST NF CARE MODERATE MDM 35: CPT | Performed by: INTERNAL MEDICINE

## 2022-10-05 PROCEDURE — 84100 ASSAY OF PHOSPHORUS: CPT | Performed by: INTERNAL MEDICINE

## 2022-10-05 PROCEDURE — 85027 COMPLETE CBC AUTOMATED: CPT

## 2022-10-05 PROCEDURE — 83735 ASSAY OF MAGNESIUM: CPT | Performed by: INTERNAL MEDICINE

## 2022-10-05 RX ORDER — SODIUM CHLORIDE 0.9 % (FLUSH) 0.9 %
10 SYRINGE (ML) INJECTION AS NEEDED
Status: DISCONTINUED | OUTPATIENT
Start: 2022-10-05 | End: 2023-01-13 | Stop reason: HOSPADM

## 2022-10-05 RX ORDER — HYDROCODONE BITARTRATE AND ACETAMINOPHEN 7.5; 325 MG/1; MG/1
1 TABLET ORAL EVERY 8 HOURS PRN
Status: CANCELLED | OUTPATIENT
Start: 2022-10-05

## 2022-10-05 RX ORDER — PANTOPRAZOLE SODIUM 40 MG/1
40 TABLET, DELAYED RELEASE ORAL
Status: CANCELLED | OUTPATIENT
Start: 2022-10-06

## 2022-10-05 RX ORDER — IPRATROPIUM BROMIDE AND ALBUTEROL SULFATE 2.5; .5 MG/3ML; MG/3ML
3 SOLUTION RESPIRATORY (INHALATION) EVERY 6 HOURS PRN
Status: CANCELLED | OUTPATIENT
Start: 2022-10-05

## 2022-10-05 RX ORDER — PROCHLORPERAZINE EDISYLATE 5 MG/ML
5 INJECTION INTRAMUSCULAR; INTRAVENOUS EVERY 6 HOURS PRN
Status: DISCONTINUED | OUTPATIENT
Start: 2022-10-05 | End: 2023-01-13 | Stop reason: HOSPADM

## 2022-10-05 RX ORDER — DOCUSATE SODIUM 100 MG/1
100 CAPSULE, LIQUID FILLED ORAL 2 TIMES DAILY
Status: CANCELLED | OUTPATIENT
Start: 2022-10-05

## 2022-10-05 RX ORDER — IPRATROPIUM BROMIDE AND ALBUTEROL SULFATE 2.5; .5 MG/3ML; MG/3ML
3 SOLUTION RESPIRATORY (INHALATION) EVERY 6 HOURS PRN
Status: DISCONTINUED | OUTPATIENT
Start: 2022-10-05 | End: 2022-10-21

## 2022-10-05 RX ORDER — ONDANSETRON 2 MG/ML
4 INJECTION INTRAMUSCULAR; INTRAVENOUS EVERY 6 HOURS PRN
Status: CANCELLED | OUTPATIENT
Start: 2022-10-05

## 2022-10-05 RX ORDER — PROCHLORPERAZINE 25 MG
25 SUPPOSITORY, RECTAL RECTAL EVERY 12 HOURS PRN
Status: DISCONTINUED | OUTPATIENT
Start: 2022-10-05 | End: 2023-01-13 | Stop reason: HOSPADM

## 2022-10-05 RX ORDER — HYDRALAZINE HYDROCHLORIDE 50 MG/1
50 TABLET, FILM COATED ORAL EVERY 8 HOURS SCHEDULED
Status: CANCELLED | OUTPATIENT
Start: 2022-10-05

## 2022-10-05 RX ORDER — CHOLECALCIFEROL (VITAMIN D3) 125 MCG
10 CAPSULE ORAL NIGHTLY
Status: DISCONTINUED | OUTPATIENT
Start: 2022-10-05 | End: 2023-01-13 | Stop reason: HOSPADM

## 2022-10-05 RX ORDER — HYDRALAZINE HYDROCHLORIDE 50 MG/1
50 TABLET, FILM COATED ORAL EVERY 8 HOURS SCHEDULED
Status: DISCONTINUED | OUTPATIENT
Start: 2022-10-05 | End: 2022-10-09

## 2022-10-05 RX ORDER — SODIUM CHLORIDE 0.9 % (FLUSH) 0.9 %
10 SYRINGE (ML) INJECTION EVERY 12 HOURS SCHEDULED
Status: DISCONTINUED | OUTPATIENT
Start: 2022-10-05 | End: 2023-01-13 | Stop reason: HOSPADM

## 2022-10-05 RX ORDER — CHOLECALCIFEROL (VITAMIN D3) 125 MCG
10 CAPSULE ORAL NIGHTLY
Status: CANCELLED | OUTPATIENT
Start: 2022-10-05

## 2022-10-05 RX ORDER — SODIUM CHLORIDE 0.9 % (FLUSH) 0.9 %
10 SYRINGE (ML) INJECTION AS NEEDED
Status: CANCELLED | OUTPATIENT
Start: 2022-10-05

## 2022-10-05 RX ORDER — DOCUSATE SODIUM 100 MG/1
100 CAPSULE, LIQUID FILLED ORAL 2 TIMES DAILY
Status: DISCONTINUED | OUTPATIENT
Start: 2022-10-05 | End: 2022-11-01

## 2022-10-05 RX ORDER — SODIUM CHLORIDE 0.9 % (FLUSH) 0.9 %
10 SYRINGE (ML) INJECTION EVERY 12 HOURS SCHEDULED
Status: CANCELLED | OUTPATIENT
Start: 2022-10-05

## 2022-10-05 RX ORDER — POLYETHYLENE GLYCOL 3350 17 G/17G
17 POWDER, FOR SOLUTION ORAL 2 TIMES DAILY PRN
Status: CANCELLED | OUTPATIENT
Start: 2022-10-05

## 2022-10-05 RX ORDER — GABAPENTIN 300 MG/1
300 CAPSULE ORAL NIGHTLY
Status: DISCONTINUED | OUTPATIENT
Start: 2022-10-05 | End: 2023-01-13 | Stop reason: HOSPADM

## 2022-10-05 RX ORDER — POLYETHYLENE GLYCOL 3350 17 G/17G
17 POWDER, FOR SOLUTION ORAL 2 TIMES DAILY PRN
Status: DISCONTINUED | OUTPATIENT
Start: 2022-10-05 | End: 2023-01-13 | Stop reason: HOSPADM

## 2022-10-05 RX ORDER — HYDROCODONE BITARTRATE AND ACETAMINOPHEN 7.5; 325 MG/1; MG/1
1 TABLET ORAL EVERY 8 HOURS PRN
Status: DISCONTINUED | OUTPATIENT
Start: 2022-10-05 | End: 2023-01-13 | Stop reason: HOSPADM

## 2022-10-05 RX ORDER — ESCITALOPRAM OXALATE 10 MG/1
10 TABLET ORAL DAILY
Status: CANCELLED | OUTPATIENT
Start: 2022-10-06

## 2022-10-05 RX ORDER — PROCHLORPERAZINE EDISYLATE 5 MG/ML
5 INJECTION INTRAMUSCULAR; INTRAVENOUS EVERY 6 HOURS PRN
Status: CANCELLED | OUTPATIENT
Start: 2022-10-05

## 2022-10-05 RX ORDER — PANTOPRAZOLE SODIUM 40 MG/1
40 TABLET, DELAYED RELEASE ORAL
Status: DISCONTINUED | OUTPATIENT
Start: 2022-10-06 | End: 2023-01-13 | Stop reason: HOSPADM

## 2022-10-05 RX ORDER — HEPARIN SODIUM 5000 [USP'U]/ML
5000 INJECTION, SOLUTION INTRAVENOUS; SUBCUTANEOUS EVERY 12 HOURS SCHEDULED
Status: CANCELLED | OUTPATIENT
Start: 2022-10-05

## 2022-10-05 RX ORDER — FUROSEMIDE 40 MG/1
40 TABLET ORAL
Status: CANCELLED | OUTPATIENT
Start: 2022-10-05

## 2022-10-05 RX ORDER — PROCHLORPERAZINE 25 MG
25 SUPPOSITORY, RECTAL RECTAL EVERY 12 HOURS PRN
Status: CANCELLED | OUTPATIENT
Start: 2022-10-05

## 2022-10-05 RX ORDER — PROCHLORPERAZINE MALEATE 5 MG/1
5 TABLET ORAL EVERY 6 HOURS PRN
Status: CANCELLED | OUTPATIENT
Start: 2022-10-05

## 2022-10-05 RX ORDER — ONDANSETRON 2 MG/ML
4 INJECTION INTRAMUSCULAR; INTRAVENOUS EVERY 6 HOURS PRN
Status: DISCONTINUED | OUTPATIENT
Start: 2022-10-05 | End: 2023-01-13 | Stop reason: HOSPADM

## 2022-10-05 RX ORDER — FUROSEMIDE 40 MG/1
40 TABLET ORAL
Status: DISCONTINUED | OUTPATIENT
Start: 2022-10-05 | End: 2023-01-13 | Stop reason: HOSPADM

## 2022-10-05 RX ORDER — PROCHLORPERAZINE MALEATE 5 MG/1
5 TABLET ORAL EVERY 6 HOURS PRN
Status: DISCONTINUED | OUTPATIENT
Start: 2022-10-05 | End: 2023-01-13 | Stop reason: HOSPADM

## 2022-10-05 RX ORDER — ESCITALOPRAM OXALATE 10 MG/1
10 TABLET ORAL DAILY
Status: DISCONTINUED | OUTPATIENT
Start: 2022-10-06 | End: 2023-01-13 | Stop reason: HOSPADM

## 2022-10-05 RX ORDER — HEPARIN SODIUM 5000 [USP'U]/ML
5000 INJECTION, SOLUTION INTRAVENOUS; SUBCUTANEOUS EVERY 12 HOURS SCHEDULED
Status: DISCONTINUED | OUTPATIENT
Start: 2022-10-05 | End: 2022-10-12 | Stop reason: SDUPTHER

## 2022-10-05 RX ORDER — GABAPENTIN 300 MG/1
300 CAPSULE ORAL NIGHTLY
Status: CANCELLED | OUTPATIENT
Start: 2022-10-05

## 2022-10-05 RX ADMIN — Medication 10 ML: at 08:37

## 2022-10-05 RX ADMIN — HEPARIN SODIUM 5000 UNITS: 5000 INJECTION INTRAVENOUS; SUBCUTANEOUS at 21:20

## 2022-10-05 RX ADMIN — FUROSEMIDE 40 MG: 40 TABLET ORAL at 08:30

## 2022-10-05 RX ADMIN — Medication 10 ML: at 21:22

## 2022-10-05 RX ADMIN — METOPROLOL TARTRATE 12.5 MG: 25 TABLET, FILM COATED ORAL at 21:20

## 2022-10-05 RX ADMIN — IPRATROPIUM BROMIDE AND ALBUTEROL SULFATE 3 ML: .5; 2.5 SOLUTION RESPIRATORY (INHALATION) at 19:15

## 2022-10-05 RX ADMIN — HYDRALAZINE HYDROCHLORIDE 50 MG: 50 TABLET, FILM COATED ORAL at 21:20

## 2022-10-05 RX ADMIN — FUROSEMIDE 40 MG: 40 TABLET ORAL at 17:30

## 2022-10-05 RX ADMIN — ESCITALOPRAM 10 MG: 10 TABLET, FILM COATED ORAL at 08:30

## 2022-10-05 RX ADMIN — IPRATROPIUM BROMIDE AND ALBUTEROL SULFATE 3 ML: .5; 2.5 SOLUTION RESPIRATORY (INHALATION) at 07:29

## 2022-10-05 RX ADMIN — TUBERCULIN PURIFIED PROTEIN DERIVATIVE 5 UNITS: 5 INJECTION, SOLUTION INTRADERMAL at 16:09

## 2022-10-05 RX ADMIN — PANTOPRAZOLE SODIUM 40 MG: 40 TABLET, DELAYED RELEASE ORAL at 06:40

## 2022-10-05 RX ADMIN — HYDRALAZINE HYDROCHLORIDE 50 MG: 50 TABLET, FILM COATED ORAL at 06:40

## 2022-10-05 RX ADMIN — Medication 10 ML: at 21:19

## 2022-10-05 RX ADMIN — METOPROLOL TARTRATE 12.5 MG: 25 TABLET, FILM COATED ORAL at 08:30

## 2022-10-05 RX ADMIN — Medication 10 MG: at 21:20

## 2022-10-05 RX ADMIN — HYDRALAZINE HYDROCHLORIDE 50 MG: 50 TABLET, FILM COATED ORAL at 16:09

## 2022-10-05 RX ADMIN — HEPARIN SODIUM 5000 UNITS: 5000 INJECTION INTRAVENOUS; SUBCUTANEOUS at 08:30

## 2022-10-05 RX ADMIN — Medication 10 ML: at 08:32

## 2022-10-05 NOTE — CASE MANAGEMENT/SOCIAL WORK
Patient approved for 10 initial days in swing bed next clinical review due 10/14/22 provider and SS notified via secure chat. Auth # [id number]

## 2022-10-05 NOTE — H&P
[facility] Medicine Services  History & Physical    Patient Identification:  Name:  Tre Kinney  Age:  89 y.o.  Sex:  female  :  1932  MRN:  [id number]   Visit Number:  [id number]  Primary Care Physician:  Jessica Pedro APRN      Please note that the patient is being admitted from [facility] hospitalist service to [facility] swing bed service for generalized debility as she needs strengthening.  The following is a summary of the [facility] hospitalization:      Date of Admission: 2022  Date of Discharge: 10/5/2022, to [facility] bed     PCP: Jessica Pedro APRN     DISCHARGE DIAGNOSIS  -Acute blood loss anemia that was present on admission and due to a right hip traumatic hematoma in the setting of the supratherapeutic INR, status post 3 units packed red blood cells transfused this admission  -Supratherapeutic INR  that was present on admission, in a patient on chronic anticoagulation with Coumadin  for paroxysmal atrial fibrillation (goal INR 2.0-3.0), nail anticoagulation has been discontinued  -Acute superior endplate compression fracture of L1 with retropulsion (2022)  -Leukocytosis on admission, suspect due to mechanical fall that occurred prior to admission  -Acute kidney injury on top of chronic kidney disease stage IV (baseline creatinine 2.1-2.4 and admission creatinine 6.1)  -Anion gap metabolic acidosis present on admission, suspect due to a combination of lactic acidosis from the acute blood loss anemia and renal acidosis  -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 Vazquez and SANDRA Jaquez with palliative care  Dr. Sanchez with nephrology  Dr. Bell with general surgery  Dr. Ho and SANDRA Xiong with orthopedic surgery     PROCEDURES PERFORMED  9/19/2022: Transfusion of two units of PRBCs on each day  9/21/2022: Transfusion of one unit of PRBCs on each day  9/19/2022-9/25/2022:  Left cephalic single lumen midline catheter     HOSPITAL COURSE  Patient is a 89 y.o. female presented to [facility] complaining of a mechanical fall with inability to get up.  In our emergency department, the patient was noted to have a hemoglobin level of 6.3.  Thus, she was admitted to the progressive care unit for further evaluation and treatment.  Please see the admitting history and physical for further details.  On initial exam, a large right hip hematoma was seen and was suspected to be the cause of the acute blood loss.  She was given Kcentra in the emergency department as well as vitamin K.  She ended up needing 3 units during the first 3 days of her hospitalization.  Afterwards, her hemoglobin was stable and she did not need any further treatment.  The hematoma seem to be resolving daily during her hospitalization.  Since the patient had a diverticular bleed in 2018 and has re-bled again now to the point of acute blood loss anemia, it has been determined that the risk of anticoagulation outweighs the benefit in this patient will be discharged without any anticoagulation.    Also, the patient had DIANA on top CKD stage IV on admission; nephrology was consulted.  With IV fluids (the initial ones containing bicarb) and blood transfusions, her renal function stabilized to normal and she will just need follow up with nephrology once she is out of this hospital.     Incidentally, the patient had a vertebral fracture that we believe occurred earlier this month.  Also, there was question of a right  distal tib fracture and as such orthopedic surgery was consulted.  No acute fractures were present per their evaluation and the patient was instructed to ambulate and weight-bear as tolerated.  PT and OT saw the patient and she is severely weak; thus, she will be admitted to swing bed for strengthening.     The patient reported dysuria later in the hospitalization; UA was abnormal but no bacteria grew on the culture.  Our team started Rocephin and this was continued even after the urine culture did not grow because her dysuria resolved.  Please note that the patient finished a course of Rocephin for an acute UTI while she was hospitalized.       During the end of her hospitalization, she had dyspnea.  She was on Lasix at home and chest x-ray here showed some mild CHF.  Thus, we gave the patient some doses of IV Bumex and then increase the home Lasix from 40 mg once a day to 40 mg twice a day.  We will periodically check her blood work to see how the creatinine and the electrolytes are trending.  Please note that we did obtain a Reds vest measurement and it was elevated prior to the doses of IV Bumex.  However, today's Reds vest measurement indicates that her lungs are now dried out.            ----------------------------------------------------------------------------------------------------------------------    The following is the history and physical for the [facility] hospitalization that is summarized above:  Now  Subjective     Chief complaint:  Fall    History of presenting illness:  Tre Kinney is a 89 y.o. female with past medical history significant for CHF, chronic kidney disease, coronary artery disease s/p stenting, history of blood clots, chronic anticoagulation with Coumadin, SSS s/p pacemaker placement, chronic diastolic CHF, and iron deficiency anemia who presents today with a chief complaint of a fall.     Patient reports that she has had 2 falls within the past month.  For the first  fall she was evaluated in the emergency department on September 8, 2022 and was complaining of pain in her bilateral lower back and abdomen.  On CT imaging of her lumbar spine she had a moderate acute superior endplate vertebral compression fracture at the L1 with about 40% loss of vertebral body height and mild posterior osseous retropulsion of about 6 mm and also noted to have chronic compression fracture deformities involving the inferior endplates of L3 and L4.  She was discharged home with a 3-day supply of Norco 7.5 mg, a back brace, and instructions to follow-up with Dr. Krishna in the outpatient setting. Patient reports that she wasn't able to attend her follow-up with orthopedic surgery as she lives with her son who has no vehicle and has no other form of transportation to make it to her doctor's appointments.     She presents today after having a mechanical fall while attempting to get to her telephone that was across the room in her bedroom. She states that she is not for sure what caused her to fall, but landed primarily on her right side. She denies any syncope, dizziness, light-headedness, chest pain, or shortness of breath that contributed to her fall. She does ambulate with a walker at home. She states that her other son that lives close by was able to come over and assist her out of the floor. EMS was called and transported her to the hospital. She was given Norco 5 mg in the ED and reports that she is currently not in any pain. It was noted on her labs that her hemoglobin was 6.1 upon arrival. She does have a large hematoma on her right side from her fall. She states she has also been having dark appearing stools at home. She denies any hematemesis or coffee ground emesis. She states her last EGD/colonoscopy was in 2018 when she was admitted to [facility] which is detailed below. She did state that she has had frequent episodes of midsternal chest pain mostly while lying in the bed at night. She states  that it will radiate to bilateral arms and she has associated nausea and diaphoresis. She reports that she will take a nitroglycerin which will relieve her chest pain. She also endorses dysuria and foul-smelling urine. She denies any abdominal pain, nausea, vomiting, or diarrhea. She is currently alert and oriented to self, place, date of birth, year, and president. She currently resides with her son who assists her with her medications.    Of note, patient was transferred from [facility] to [facility] in March of 2018 due to symptomatic acute blood loss anemia due to rectal bleeding.  She was also on Coumadin at that time for atrial fibrillation and prior blood clots.  GI services was consulted and she underwent an EGD and colonoscopy during that stay.  Her EGD showed no active bleeding but evidence of gastritis.  Colonoscopy revealed a few polyps that were not removed or biopsied  And also found to have diverticulosis and likely diverticular bleed since resolved.  She did receive 2 units packed red blood cells with no further bleeding noted.  Her Coumadin was held throughout her hospital stay and 3 days after discharge, but was resumed per PCP recommendations with them to manage Coumadin and on orders.    Upon arrival to the ED, vital signs were temperature 97.7, heart rate 87, respirations 18, blood pressure 127/60, SPO2 97% on room air.  CMP with glucose 137, sodium 130, potassium 5.5, CO2 17, chloride 93, anion gap 20, creatinine 6.11, BUN 31, EGFR 6.1, albumin 3.43, otherwise unremarkable.  CBC with WBC 15.87, RBC 2.16, hemoglobin 6.3, hematocrit 19.6, RDW 17, RDW-SD 55.5, otherwise unremarkable.    Known Emergency Department medications received prior to my evaluation included IV Rocephin, Norco 5 mg, IV Zofran 4 mg, IV phytonadione.     Patient will be admitted to the PCU for further evaluation and  monitoring.    ---------------------------------------------------------------------------------------------------------------------   Review of Systems   Constitutional: Positive for diaphoresis (associated with chest pain). Negative for activity change, chills and fever.   HENT: Negative for congestion and rhinorrhea.    Eyes: Negative for discharge and redness.   Respiratory: Negative for apnea, cough and shortness of breath.    Cardiovascular: Positive for chest pain (midsternal; radiates to bilateral arms). Negative for palpitations.   Gastrointestinal: Positive for nausea (associated with chest pain). Negative for abdominal distention, abdominal pain, diarrhea and vomiting.   Genitourinary: Positive for dysuria. Negative for difficulty urinating, frequency and urgency.        Reports foul smelling urine   Musculoskeletal: Positive for gait problem (uses walker to ambulate). Negative for arthralgias and myalgias.   Skin: Positive for color change (bruising from recent falls). Negative for wound.   Neurological: Negative for dizziness, syncope and light-headedness.   Psychiatric/Behavioral: Negative for agitation, behavioral problems and confusion.        ---------------------------------------------------------------------------------------------------------------------   Past Medical History:   Diagnosis Date   • Anemia    • Arthritis    • CHF (congestive heart failure) (HCC)    • Chronic kidney disease    • Coronary artery disease    • H/O blood clots    • History of transfusion     no reaction    • Hypertension      Past Surgical History:   Procedure Laterality Date   • ABDOMINAL SURGERY     • APPENDECTOMY     • CARDIAC SURGERY     •  SECTION     • COLONOSCOPY N/A 3/7/2018    Procedure: COLONOSCOPY;  Surgeon: Willie Gresham MD;  Location: [facility] ENDOSCOPY;  Service:    • CORONARY ANGIOPLASTY WITH STENT PLACEMENT     • ENDOSCOPY N/A 3/6/2018    Procedure: ESOPHAGOGASTRODUODENOSCOPY;  Surgeon: Willie KWAN  MD Mp;  Location: [facility];  Service:    • PACEMAKER IMPLANTATION      left side    • SKIN BIOPSY     • TUBAL ABDOMINAL LIGATION       Family History   Problem Relation Age of Onset   • Other Mother         cardiac disorder   • Hypertension Mother    • Heart disease Mother    • Thyroid disease Mother    • Other Father         cardiac disorder   • Heart disease Father    • Hypertension Father    • Other Sister         cardiac disorder   • Other Brother         cardiac disorder   • Other Other         cardiac disorder   • Other Other         cardiac disorder     Social History     Socioeconomic History   • Marital status:    Tobacco Use   • Smoking status: Never Smoker   Substance and Sexual Activity   • Alcohol use: No   • Drug use: No   • Sexual activity: Defer     ---------------------------------------------------------------------------------------------------------------------   Allergies:  Adhesive tape and Xanax [alprazolam]  ---------------------------------------------------------------------------------------------------------------------   Home medications:   Current listed hospital scheduled medications may not yet reflect those currently placed in orders that are signed and held awaiting patient's arrival to floor.   ---------------------------------------------------------------------------------------------------------------------      Objective      Vital Signs:  Temp:  [97.5 °F (36.4 °C)-98.1 °F (36.7 °C)] 98.1 °F (36.7 °C)  Heart Rate:  [71-93] 87  Resp:  [16-20] 20  BP: (114-157)/(49-87) 127/58  Vitals             09/19/22  1052   Weight: 72.6 kg (160 lb)         Body mass index is 25.06 kg/m².  ---------------------------------------------------------------------------------------------------------------------       Physical Exam  Constitutional:       General: She is awake.      Appearance: Normal appearance. She is overweight.      Interventions: Nasal cannula in place.       Comments: Resting comfortably in bed. Appears in no acute distress. Nasal cannula in place.    HENT:      Head: Normocephalic and atraumatic.      Right Ear: External ear normal.      Left Ear: External ear normal.      Nose: Nose normal.      Mouth/Throat:      Mouth: Mucous membranes are dry.      Pharynx: Oropharynx is clear.   Eyes:      Extraocular Movements: Extraocular movements intact.      Conjunctiva/sclera: Conjunctivae normal.      Pupils: Pupils are equal, round, and reactive to light.   Cardiovascular:      Rate and Rhythm: Normal rate and regular rhythm.      Pulses: Normal pulses.           Dorsalis pedis pulses are 2+ on the right side and 2+ on the left side.        Posterior tibial pulses are 2+ on the right side and 2+ on the left side.      Heart sounds: Normal heart sounds. No murmur heard.    No friction rub. No gallop.   Pulmonary:      Effort: Pulmonary effort is normal. No respiratory distress.      Breath sounds: Normal breath sounds. No wheezing, rhonchi or rales.      Comments: Nasal cannula placed for comfort; saturations currently 97%.   Abdominal:      General: Abdomen is flat. Bowel sounds are normal. There is no distension.      Palpations: Abdomen is soft.      Tenderness: There is no abdominal tenderness. There is no guarding.   Musculoskeletal:         General: Normal range of motion.      Right lower leg: No edema.      Left lower leg: No edema.   Skin:     General: Skin is warm and dry.      Coloration: Skin is pale.      Findings: Bruising present. No erythema.          Neurological:      General: No focal deficit present.      Mental Status: She is alert and oriented to person, place, and time. Mental status is at baseline.      Comments: Alert and oriented x 4. No focal deficits noted.    Psychiatric:         Mood and Affect: Mood normal.         Behavior: Behavior normal. Behavior is cooperative.         Thought Content: Thought content normal.          ---------------------------------------------------------------------------------------------------------------------  EKG:            Telemetry:       I have personally looked at both the EKG and the telemetry strips.  ---------------------------------------------------------------------------------------------------------------------     Results from last 7 days   Lab Units 09/19/22  2350 09/19/22  2124 09/19/22  1608 09/19/22  1521   LACTATE mmol/L  --  1.3 2.3*  --    WBC 10*3/mm3  --   --   --  15.87*   HEMOGLOBIN g/dL  --   --   --  6.3*   HEMATOCRIT %  --   --   --  19.6*   MCV fL  --   --   --  90.7   MCHC g/dL  --   --   --  32.1   PLATELETS 10*3/mm3  --   --   --  250   INR   >10.00*  --   --   --            Results from last 7 days   Lab Units 09/19/22  1521   SODIUM mmol/L 130*   POTASSIUM mmol/L 5.5*   CHLORIDE mmol/L 93*   CO2 mmol/L 17.0*   BUN mg/dL 91*   CREATININE mg/dL 6.11*   CALCIUM mg/dL 9.0   GLUCOSE mg/dL 137*   ALBUMIN g/dL 3.43*   BILIRUBIN mg/dL 0.9   ALK PHOS U/L 77   AST (SGOT) U/L 19   ALT (SGPT) U/L 8   Estimated Creatinine Clearance: 7.2 mL/min (A) (by C-G formula based on SCr of 6.11 mg/dL (H)).         Results from last 7 days   Lab Units 09/19/22  1521   TROPONIN T ng/mL 0.036*           Results from last 7 days   Lab Units 09/19/22  1521   PROBNP pg/mL 11,666.0*            Lab Results   Component Value Date     TSH 4.999 (H) 11/10/2017      ---------------------------------------------------------------------------------------------------------------------  Imaging Results (Last 7 Days)      Procedure Component Value Units Date/Time     CT Abdomen Pelvis Without Contrast [[id number]] Collected: 09/19/22 2205       Updated: 09/19/22 2207     Narrative:       CT Abdomen Pelvis WO     INDICATION:   Abdominal pain.     TECHNIQUE:   CT of the abdomen and pelvis without IV contrast. Coronal and sagittal reconstructions were obtained.  Radiation dose reduction techniques included  automated exposure control or exposure modulation based on body size. Count of known CT and cardiac nuc  med studies performed in previous 12 months: 2.      COMPARISON:   None available.     FINDINGS:  Abdomen: There is mild scarring and volume loss at the left lung base. There is a moderate-sized hiatal hernia. The liver contains a small cyst or hemangioma in the right lobe measuring 1.5 cm in diameter with a similar benign-appearing lesion in the  lateral segment of the left lobe just under the capsule measuring 1 cm. The spleen and pancreas are normal in size. There is bilateral renal atrophy, worse on the right. There is a 3.5 cm right renal cyst. No hydronephrosis. The adrenal glands are normal  in size. No evidence of retroperitoneal adenopathy. No distended bowel loops.     Pelvis: No appendix is seen. No inflammatory changes are seen around the cecum. There is sigmoid diverticulosis. No acute inflammatory changes are seen. No enlarged pelvic lymph nodes are seen. There is subcutaneous streaking of fat in the right gluteal  area that could represent either inflammatory change or hematoma. It is new since a pelvic CT on 922.        Impression:       Subcutaneous swelling in the gluteal area most likely representing a gluteal hematoma. This is a new finding since the recent pelvic CT. No acute or inflammatory changes are seen elsewhere in the abdomen or pelvis. Also noted is a hiatal hernia, renal  atrophy and diverticulosis.              Signer Name: Bubba Mancuso MD   Signed: 9/19/2022 10:05 PM   Workstation Name: [id number]    [facility]     CT Lower Extremity Right Without Contrast [[id number]] Collected: 09/19/22 1440       Updated: 09/19/22 1443     Narrative:       EXAMINATION: CT LOWER EXTREMITY RIGHT WO CONTRAST-      CLINICAL INDICATION: tib/fib (questionable tibial plateau and distal  tibia fracture fracture on x-ray)        COMPARISON: X-rays of the right lower extremity  dated 09/19/2022     Radiation dose reduction techniques were utilized per ALARA protocol.  Automated exposure control was initiated through either or [employer] or  [employer] software packages by  protocol.           PROCEDURE: Axial images were acquired from the distal femur through the  ankle without any IV contrast. Reformatted images were created.     FINDINGS:  There is joint space narrowing at the knee.  Irregularity of the lateral tibial plateau probably due to an old injury  and or arthritic change, but no convincing evidence of acute fracture on  the presented study     Otherwise no evidence of acute fracture.        Impression:       1. Extensive arthritic change at the knee, but no acute fracture is seen        This report was finalized on 9/19/2022 2:41 PM by Dr. Bob Lovell MD.        XR Hip With or Without Pelvis 2 - 3 View Right [[id number]] Collected: 09/19/22 1306       Updated: 09/19/22 1332     Narrative:       EXAM:    XR Right Hip With Pelvis When Performed, 2 or 3 Views     EXAM DATE:    9/19/2022 12:11 PM     CLINICAL HISTORY:    trauma     TECHNIQUE:    Two or three views of the right hip with pelvis when performed.     COMPARISON:    No relevant prior studies available.     FINDINGS:    Bones/joints:  Unremarkable.  No acute fracture.  No dislocation.    Soft tissues:  Unremarkable.        Impression:         No acute findings in the right hip.     This report was finalized on 9/19/2022 1:07 PM by Dr. Bob Lovell MD.        XR Femur 2 View Right [[id number]] Collected: 09/19/22 1306       Updated: 09/19/22 1308     Narrative:       EXAM:    XR Right Femur, 2 Views     EXAM DATE:    9/19/2022 12:12 PM     CLINICAL HISTORY:    fall     TECHNIQUE:    Frontal and lateral views of the right femur.     COMPARISON:    No relevant prior studies available.     FINDINGS:    Bones/joints:  Unremarkable.  No acute fracture.  No dislocation.    Soft tissues:  Unremarkable.        Impression:          No acute findings in the right femur.     This report was finalized on 9/19/2022 1:06 PM by Dr. Bob Lovell MD.        XR Tibia Fibula 2 View Right [[id number]] Collected: 09/19/22 1305       Updated: 09/19/22 1308     Narrative:       EXAM:    XR Right Tibia and Fibula, 2 Views     EXAM DATE:    9/19/2022 12:11 PM     CLINICAL HISTORY:    fall     TECHNIQUE:    Frontal and lateral views of the right tibia and fibula.     COMPARISON:    No relevant prior studies available.     FINDINGS:    Bones/joints:  Mild irregularity of the lateral tibial plateau. Cannot  exclude nondisplaced fracture.  No dislocation.    Soft tissues:  Unremarkable.  No radiopaque foreign body.    Other findings:  Demineralization.        Impression:         Mild irregularity of the lateral tibial plateau. Cannot exclude  nondisplaced fracture.     This report was finalized on 9/19/2022 1:06 PM by Dr. Bob Lovell MD.        XR Ankle 3+ View Right [[id number]] Collected: 09/19/22 1304       Updated: 09/19/22 1307     Narrative:       EXAM:    XR Right Ankle Complete, 3 or More Views     EXAM DATE:    9/19/2022 12:11 PM     CLINICAL HISTORY:    fall     TECHNIQUE:    Frontal, lateral and oblique views of the right ankle.     COMPARISON:    No relevant prior studies available.     FINDINGS:    Bones/joints:  Oblique lucency through the distal tibia medially may  represent a nondisplaced fracture.  Old fracture of the distal fibula.   No dislocation.    Soft tissues:  Unremarkable.        Impression:         Oblique lucency through the distal tibia medially may represent a  nondisplaced fracture.     This report was finalized on 9/19/2022 1:05 PM by Dr. Bob Lovell MD.        XR Foot 3+ View Right [[id number]] Collected: 09/19/22 1304       Updated: 09/19/22 1306     Narrative:       EXAM:    XR Right Foot Complete, 3 or More Views     EXAM DATE:    9/19/2022 12:11 PM     CLINICAL HISTORY:    fall     TECHNIQUE:    Frontal, lateral and  oblique views of the right foot.     COMPARISON:    No relevant prior studies available.     FINDINGS:    Bones/joints:  Unremarkable.  No acute fracture.  No dislocation.    Soft tissues:  Unremarkable.  No radiopaque foreign body.        Impression:         No acute findings in the right foot.     This report was finalized on 9/19/2022 1:04 PM by Dr. Bob Lovell MD.          Last echocardiogram:  Results for orders placed during the hospital encounter of 09/19/22    Adult Transthoracic Echo Complete W/ Cont if Necessary Per Protocol    Interpretation Summary  · Left ventricular ejection fraction appears to be 51 - 55%. Left ventricular systolic function is low normal.  · Left ventricular wall thickness is consistent with mild concentric hypertrophy.  · Left ventricular diastolic function was indeterminate.  · The right ventricular cavity is moderately dilated.  · Moderately reduced right ventricular systolic function noted.  · The left atrial cavity is moderate to severely dilated  · The right atrial cavity is moderately dilated.  · Moderate tricuspid valve regurgitation is present.  · Estimated right ventricular systolic pressure from tricuspid regurgitation is markedly elevated (>55 mmHg).  · Severe pulmonary hypertension is present.  · Electronic lead present in the ventricle  · There is no evidence of pericardial effusion    I have personally reviewed the above radiology images and read the final radiology report on 10/05/22  ---------------------------------------------------------------------------------------------------------------------  Assessment / Plan     ASSESSMENT/PLAN:  -Acute life-threatening anemia likely secondary to large gluteal hematoma, POA  -History of acute blood loss anemia in 2018 suspected to be from diverticular bleed   -History of iron deficiency anemia   -Hemoglobin and hematocrit upon arrival 6.3 and 19.6 respectively; previously during ED visit on 09/08 hemoglobin 12.4 and  hematocrit 38.8.   -Last colonoscopy/EGD in 2018 during [facility] admission. EGD showed no active bleeding but evidence of gastritis; Colonoscopy revealed a few polyps that were not removed or biopsied, also found to have diverticulosis and likely diverticular bleed since resolved.  -Reports episodes of melena at home; will monitor if patient has additional episodes of melena during admission.   -ED provider ordered 2 U PRBC's; nursing communication in chart to order repeat CBC after first unit of blood.   -Obtain iron, vitamin B12, and folate.    -Possible acute, nondisplaced, right lateral tibial plateau and distal tibia fractures, POA  -Traumatic right gluteal hematoma, POA  -Acute superior endplate compression fracture of L1 with retropulsion (09/08/22)  -Leukocytosis, likely reactive to fall  -Mechanical fall  -XR of right lower extremity with findings noted above.   -CT abdomen/pelvis with subcutaneous swelling swelling in gluteal area representing gluteal hematoma.   -Vertebral fracture found during 09/08/22 ED visit 2/2 fall and was discharged home with back brace with instruction to follow-up with orthopedic surgery, but missed appointment due to transportation.   -Inpatient orthopedic and general surgery consulted, input and assistance much appreciated.   -Will keep NPO pending surgical evaluation.   -Up with assistance, fall precautions.   -Neurovascular checks every 4 hours.   -PT/OT consults ordered.     -Acute on chronic kidney disease stage IV, POA  -Bilateral renal atrophy, R > L   -Right renal cyst (3.5 cm)  -Anion-gap metabolic acidosis   -Lactic acidosis   -Mild hyperkalemia   -Creatinine upon arrival 6.11; previously during ED visit on 09/08 creatinine 2.45; baseline appears to be 2.1-2.4.   -CT A/P reviewed with findings noted above.   -Lactate 2.3 upon arrival, but has normalized after fluids.   -Received 500 mL fluid bolus in ED; will hold on additional fluids at this time as 2 units of blood to be  transfused.   -Inpatient nephrology consulted, input and assistance much appreciated.   -Obtain creatinine kinase to evaluate for rhabdomyolysis in setting of fall.   -Obtain urine electrolytes and urine protein/creatinine ratio.   -Avoid nephrotoxins as able; planning to hold home Lasix and Neurontin.     -Supratherapeutic INR on chronic anticoagulation with Coumadin (goal INR 2.0-3.0)  -History of blood clots, details unknown  -Paroxysmal atrial fibrillation   -SSS s/p pacemaker placement  -PT and PTT > 100 and INR > 10 upon arrival.   -Warfarin reversal initiated in ED with IV Vitamin K 10 units and IV KCentra 3630 units.   -Daily PT/INR ordered.   -Will hold Coumadin for now.     -Chest pain with typical features, POA  -Coronary artery disease s/p stenting   -Reports intermittent episodes of midsternal chest pain at home.   -Troponin T 0.036; continue to trend.   -EKG without acute ischemic abnormalities present.   -Obtain a.m. TTE.   -May consider cardiology consult.     -Acute urinary tract infection, POA  -Reported dysuria  -UA with 3 + leukocytes, 21-30 WBC, 2+ bacteria, 36 comets epithelium  -Pending urine culture.  -Received IV Rocephin in ED; will continue on admission as patient is symptomatic.    -Chronic diastolic CHF  -Echo from 12/2017 reviewed and noted above.   -Appears euvolemic on exam.   -Obtain TTE as noted above.   -Monitor with strict I/O's and daily weights.     -Generalized anxiety disorder  -Supportive care.   -Review home medications once reconciled.     -Debility  -Palliative care consult.   - consult.   ----------  -DVT prophylaxis: SCD's contraindicated 2/2 frature  -Activity: Up with assistance, fall precautions  -Expected length of stay:   INPATIENT status due to the need for care which can only be reasonably provided in an hospital setting such as aggressive/expedited ancillary services and/or consultation services, the necessity for IV medications, close physician  monitoring and/or the possible need for procedures.  In such, I feel patient's risk for adverse outcomes and need for care warrant INPATIENT evaluation and predict the patient's care encounter to likely last beyond 2 midnights.    Medical Intervention Limits: NO intubation (DNI)  Level Of Support Discussed With: Patient; Next of Kin (If No Surrogate)  Code Status (Patient has no pulse and is not breathing): No CPR (Do Not Attempt to Resuscitate)  Medical Interventions (Patient has pulse or is breathing): Limited Support  Comments: Patient states that she does not want to be resuscitated or put on a ventilator, her son Leno supports her decision    High risk secondary to acute life-threatening anemia likely secondary to large gluteal hematoma, possible acute nondisplaced right lateral tibial plateau and distal tibia fractures, acute on chronic CKD stage IV, supratherapeutic INR     Disposition: Pending clinical course    Michelle Sellers PA-C  09/20/22  01:28 EDT    ---------------------------------------------------------------------------------------------------------------------       Jannet Lagunas MD  [facility]  10/05/22  16:57 EDT

## 2022-10-05 NOTE — PAYOR COMM NOTE
"Michelet Jacques RN  Swing Bed Nurse  [phone] Ex [phone] FAX: [phone]  [email or web address]  [facility]  NPI [id number]  Reference Number     ICD 10  D64.9  N39.0  R53.1      Tre Kinney (89 y.o. Female)            YOB: 1932  Social Security Number:   Address: [address] [place] [address]  Home Phone: [phone]  MRN: [id number]  Restorationism: Oriental orthodox  Marital Status:         Admission Date: 9/19/22  Admission Type: Emergency  Admitting Provider: Jannet Lagunas MD NPI [id number]  Attending Provider: Jannet Lagunas MD  Department, Room/Bed: [place] [id number]/  Discharge Date:   Discharge Disposition:   Discharge Destination:               Attending Provider: Jannet Lagunas MD    Allergies: Adhesive Tape, Xanax [Alprazolam]    Isolation: None   Infection: None   Code Status: No CPR   Advance Care Planning Activity    Ht: 170.2 cm (67\")   Wt: 76.6 kg (168 lb 14.4 oz)    Admission Cmt: None   Principal Problem: Anemia, unspecified type [D64.9]                 Active Insurance as of 9/19/2022     Primary Coverage     Payor Plan Insurance Group Employer/Plan Group    ANTHEM MEDICARE REPLACEMENT ANTHEM MEDICARE ADVANTAGE [id number]     Payor Plan Address Payor Plan Phone Number Payor Plan Fax Number Effective Dates     [id number] [phone]  1/1/2022 - None Entered    [facility] [id number]       Subscriber Name Subscriber Birth Date Member ID       TRE KINNEY 12/9/1932 [id number]                 Emergency Contacts      (Rel.) Home Phone Work Phone Mobile Phone    Valdez Kinney (Son) [phone] -- --    Leno Kinney (Son) [phone] -- [phone]               History & Physical      Michelle Sellers PA-C at 09/20/22 0128     Attestation signed by Jannet Lagunas MD at 09/20/22 0535 (Updated)    I have reviewed this documentation and agree.  I have discussed and formulated the " assessment and plan with TEO Martinez and I have independently seen the patient.  The patient states that she fell days ago, though she cannot tell me exactly when.  She has had severe right hip pain since then.  She also states that she has left leg pain below her knee and in her ankle area that is worse than her baseline pain.  Since falling, the patient has not walked.  On exam, the patient does have a large bruise on her lateral left arm.  She has a large hematoma with swelling on her right buttocks.  She appears pale on exam.  I do not see any obvious deformity to her right leg.  I suspect that the patient fell and had an INR greater than 10 and that this has led to acute blood loss anemia due to the development of the large right buttocks hematoma.  Thus, the emergency department gave her Kcentra and vitamin K.  We will hold all anticoagulation.  We will repeat her blood work after the second unit of packed red blood cells that was written by the emergency department.  We will consult general surgery to evaluate the hematoma, orthopedic surgery to evaluate her to tibial fractures on the right leg, nephrology to evaluate the acute kidney injury on top of chronic kidney disease stage IV, and palliative care to address goals of care as patient is of advanced age.  The urinalysis was suspicious for an acute UTI; cultures have been sent and we will give the patient high-dose Rocephin.                      [facility] Medicine Services  History & Physical    Patient Identification:  Name:  Tre Kinney  Age:  89 y.o.  Sex:  female  :  1932  MRN:  [id number]   Visit Number:  [id number]  Primary Care Physician:  Jessica Pedro APRN    Subjective     2022   Chief complaint:   Chief Complaint   Patient presents with   • Fall     History of presenting illness:      Tre Kinney is a 89 y.o. female with past medical history significant for CHF, chronic kidney disease, coronary artery  disease s/p stenting, history of blood clots, chronic anticoagulation with Coumadin, SSS s/p pacemaker placement, chronic diastolic CHF, and iron deficiency anemia who presents today with a chief complaint of a fall.     Patient reports that she has had 2 falls within the past month.  For the first fall she was evaluated in the emergency department on September 8, 2022 and was complaining of pain in her bilateral lower back and abdomen.  On CT imaging of her lumbar spine she had a moderate acute superior endplate vertebral compression fracture at the L1 with about 40% loss of vertebral body height and mild posterior osseous retropulsion of about 6 mm and also noted to have chronic compression fracture deformities involving the inferior endplates of L3 and L4.  She was discharged home with a 3-day supply of Norco 7.5 mg, a back brace, and instructions to follow-up with Dr. Krishna in the outpatient setting. Patient reports that she wasn't able to attend her follow-up with orthopedic surgery as she lives with her son who has no vehicle and has no other form of transportation to make it to her doctor's appointments.     She presents today after having a mechanical fall while attempting to get to her telephone that was across the room in her bedroom. She states that she is not for sure what caused her to fall, but landed primarily on her right side. She denies any syncope, dizziness, light-headedness, chest pain, or shortness of breath that contributed to her fall. She does ambulate with a walker at home. She states that her other son that lives close by was able to come over and assist her out of the floor. EMS was called and transported her to the hospital. She was given Norco 5 mg in the ED and reports that she is currently not in any pain. It was noted on her labs that her hemoglobin was 6.1 upon arrival. She does have a large hematoma on her right side from her fall. She states she has also been having dark  appearing stools at home. She denies any hematemesis or coffee ground emesis. She states her last EGD/colonoscopy was in 2018 when she was admitted to [facility] which is detailed below. She did state that she has had frequent episodes of midsternal chest pain mostly while lying in the bed at night. She states that it will radiate to bilateral arms and she has associated nausea and diaphoresis. She reports that she will take a nitroglycerin which will relieve her chest pain. She also endorses dysuria and foul-smelling urine. She denies any abdominal pain, nausea, vomiting, or diarrhea. She is currently alert and oriented to self, place, date of birth, year, and president. She currently resides with her son who assists her with her medications.    Of note, patient was transferred from [facility] to [facility] in March of 2018 due to symptomatic acute blood loss anemia due to rectal bleeding.  She was also on Coumadin at that time for atrial fibrillation and prior blood clots.  GI services was consulted and she underwent an EGD and colonoscopy during that stay.  Her EGD showed no active bleeding but evidence of gastritis.  Colonoscopy revealed a few polyps that were not removed or biopsied  And also found to have diverticulosis and likely diverticular bleed since resolved.  She did receive 2 units packed red blood cells with no further bleeding noted.  Her Coumadin was held throughout her hospital stay and 3 days after discharge, but was resumed per PCP recommendations with them to manage Coumadin and on orders.    Upon arrival to the ED, vital signs were temperature 97.7, heart rate 87, respirations 18, blood pressure 127/60, SPO2 97% on room air.  CMP with glucose 137, sodium 130, potassium 5.5, CO2 17, chloride 93, anion gap 20, creatinine 6.11, BUN 31, EGFR 6.1, albumin 3.43, otherwise unremarkable.  CBC with WBC 15.87, RBC 2.16, hemoglobin 6.3, hematocrit 19.6, RDW 17, RDW-SD 55.5, otherwise  unremarkable.    Known Emergency Department medications received prior to my evaluation included IV Rocephin, Norco 5 mg, IV Zofran 4 mg, IV phytonadione.     Patient will be admitted to the PCU for further evaluation and monitoring.    ---------------------------------------------------------------------------------------------------------------------   Review of Systems   Constitutional: Positive for diaphoresis (associated with chest pain). Negative for activity change, chills and fever.   HENT: Negative for congestion and rhinorrhea.    Eyes: Negative for discharge and redness.   Respiratory: Negative for apnea, cough and shortness of breath.    Cardiovascular: Positive for chest pain (midsternal; radiates to bilateral arms). Negative for palpitations.   Gastrointestinal: Positive for nausea (associated with chest pain). Negative for abdominal distention, abdominal pain, diarrhea and vomiting.   Genitourinary: Positive for dysuria. Negative for difficulty urinating, frequency and urgency.        Reports foul smelling urine   Musculoskeletal: Positive for gait problem (uses walker to ambulate). Negative for arthralgias and myalgias.   Skin: Positive for color change (bruising from recent falls). Negative for wound.   Neurological: Negative for dizziness, syncope and light-headedness.   Psychiatric/Behavioral: Negative for agitation, behavioral problems and confusion.        ---------------------------------------------------------------------------------------------------------------------   Past Medical History:   Diagnosis Date   • Anemia    • Arthritis    • CHF (congestive heart failure) (HCC)    • Chronic kidney disease    • Coronary artery disease    • H/O blood clots    • History of transfusion     no reaction    • Hypertension      Past Surgical History:   Procedure Laterality Date   • ABDOMINAL SURGERY     • APPENDECTOMY     • CARDIAC SURGERY     •  SECTION     • COLONOSCOPY N/A 3/7/2018     Procedure: COLONOSCOPY;  Surgeon: Willie Gresham MD;  Location:  [facility];  Service:    • CORONARY ANGIOPLASTY WITH STENT PLACEMENT     • ENDOSCOPY N/A 3/6/2018    Procedure: ESOPHAGOGASTRODUODENOSCOPY;  Surgeon: Willie Gresham MD;  Location:  [facility];  Service:    • PACEMAKER IMPLANTATION      left side    • SKIN BIOPSY     • TUBAL ABDOMINAL LIGATION       Family History   Problem Relation Age of Onset   • Other Mother         cardiac disorder   • Hypertension Mother    • Heart disease Mother    • Thyroid disease Mother    • Other Father         cardiac disorder   • Heart disease Father    • Hypertension Father    • Other Sister         cardiac disorder   • Other Brother         cardiac disorder   • Other Other         cardiac disorder   • Other Other         cardiac disorder     Social History     Socioeconomic History   • Marital status:    Tobacco Use   • Smoking status: Never Smoker   Substance and Sexual Activity   • Alcohol use: No   • Drug use: No   • Sexual activity: Defer     ---------------------------------------------------------------------------------------------------------------------   Allergies:  Adhesive tape  ---------------------------------------------------------------------------------------------------------------------   Home medications:    Medications below are reported home medications pulling from within the system; at this time, these medications have not been reconciled unless otherwise specified and are in the verification process for further verifcation as current home medications.  (Not in a hospital admission)      Hospital Scheduled Meds:  prothrombin complex conc human, 50 Units/kg, Intravenous, Once   And  phytonadione (VITAMIN K) IVPB, 10 mg, Intravenous, Once           Current listed hospital scheduled medications may not yet reflect those currently placed in orders that are signed and held awaiting patient's arrival to floor.    ---------------------------------------------------------------------------------------------------------------------     Objective     Vital Signs:  Temp:  [97.5 °F (36.4 °C)-98.1 °F (36.7 °C)] 98.1 °F (36.7 °C)  Heart Rate:  [71-93] 87  Resp:  [16-20] 20  BP: (114-157)/(49-87) 127/58      09/19/22  1052   Weight: 72.6 kg (160 lb)     Body mass index is 25.06 kg/m².  ---------------------------------------------------------------------------------------------------------------------       Physical Exam  Constitutional:       General: She is awake.      Appearance: Normal appearance. She is overweight.      Interventions: Nasal cannula in place.      Comments: Resting comfortably in bed. Appears in no acute distress. Nasal cannula in place.    HENT:      Head: Normocephalic and atraumatic.      Right Ear: External ear normal.      Left Ear: External ear normal.      Nose: Nose normal.      Mouth/Throat:      Mouth: Mucous membranes are dry.      Pharynx: Oropharynx is clear.   Eyes:      Extraocular Movements: Extraocular movements intact.      Conjunctiva/sclera: Conjunctivae normal.      Pupils: Pupils are equal, round, and reactive to light.   Cardiovascular:      Rate and Rhythm: Normal rate and regular rhythm.      Pulses: Normal pulses.           Dorsalis pedis pulses are 2+ on the right side and 2+ on the left side.        Posterior tibial pulses are 2+ on the right side and 2+ on the left side.      Heart sounds: Normal heart sounds. No murmur heard.    No friction rub. No gallop.   Pulmonary:      Effort: Pulmonary effort is normal. No respiratory distress.      Breath sounds: Normal breath sounds. No wheezing, rhonchi or rales.      Comments: Nasal cannula placed for comfort; saturations currently 97%.   Abdominal:      General: Abdomen is flat. Bowel sounds are normal. There is no distension.      Palpations: Abdomen is soft.      Tenderness: There is no abdominal tenderness. There is no guarding.    Musculoskeletal:         General: Normal range of motion.      Right lower leg: No edema.      Left lower leg: No edema.   Skin:     General: Skin is warm and dry.      Coloration: Skin is pale.      Findings: Bruising present. No erythema.          Neurological:      General: No focal deficit present.      Mental Status: She is alert and oriented to person, place, and time. Mental status is at baseline.      Comments: Alert and oriented x 4. No focal deficits noted.    Psychiatric:         Mood and Affect: Mood normal.         Behavior: Behavior normal. Behavior is cooperative.         Thought Content: Thought content normal.         ---------------------------------------------------------------------------------------------------------------------  EKG:            Telemetry:       I have personally looked at both the EKG and the telemetry strips.  ---------------------------------------------------------------------------------------------------------------------   Results from last 7 days   Lab Units 09/19/22  2350 09/19/22  2124 09/19/22  1608 09/19/22  1521   LACTATE mmol/L  --  1.3 2.3*  --    WBC 10*3/mm3  --   --   --  15.87*   HEMOGLOBIN g/dL  --   --   --  6.3*   HEMATOCRIT %  --   --   --  19.6*   MCV fL  --   --   --  90.7   MCHC g/dL  --   --   --  32.1   PLATELETS 10*3/mm3  --   --   --  250   INR  >10.00*  --   --   --          Results from last 7 days   Lab Units 09/19/22  1521   SODIUM mmol/L 130*   POTASSIUM mmol/L 5.5*   CHLORIDE mmol/L 93*   CO2 mmol/L 17.0*   BUN mg/dL 91*   CREATININE mg/dL 6.11*   CALCIUM mg/dL 9.0   GLUCOSE mg/dL 137*   ALBUMIN g/dL 3.43*   BILIRUBIN mg/dL 0.9   ALK PHOS U/L 77   AST (SGOT) U/L 19   ALT (SGPT) U/L 8   Estimated Creatinine Clearance: 7.2 mL/min (A) (by C-G formula based on SCr of 6.11 mg/dL (H)).  No results found for: AMMONIA  Results from last 7 days   Lab Units 09/19/22  1521   TROPONIN T ng/mL 0.036*     Results from last 7 days   Lab Units  09/19/22  1521   PROBNP pg/mL 11,666.0*     No results found for: HGBA1C  Lab Results   Component Value Date    TSH 4.999 (H) 11/10/2017     No results found for: PREGTESTUR, PREGSERUM, HCG, HCGQUANT  Pain Management Panel     Pain Management Panel Latest Ref Rng & Units 7/24/2019 7/24/2019    CREATININE UR mg/dL 48.9 48.9    AMPHETAMINES SCREEN, URINE Negative - -    BARBITURATES SCREEN Negative - -    BENZODIAZEPINE SCREEN, URINE Negative - -    COCAINE SCREEN, URINE Negative - -    METHADONE SCREEN, URINE Negative - -            ---------------------------------------------------------------------------------------------------------------------  Imaging Results (Last 7 Days)     Procedure Component Value Units Date/Time    CT Abdomen Pelvis Without Contrast [[id number]] Collected: 09/19/22 2205     Updated: 09/19/22 2207    Narrative:      CT Abdomen Pelvis WO    INDICATION:   Abdominal pain.    TECHNIQUE:   CT of the abdomen and pelvis without IV contrast. Coronal and sagittal reconstructions were obtained.  Radiation dose reduction techniques included automated exposure control or exposure modulation based on body size. Count of known CT and cardiac nuc  med studies performed in previous 12 months: 2.     COMPARISON:   None available.    FINDINGS:  Abdomen: There is mild scarring and volume loss at the left lung base. There is a moderate-sized hiatal hernia. The liver contains a small cyst or hemangioma in the right lobe measuring 1.5 cm in diameter with a similar benign-appearing lesion in the  lateral segment of the left lobe just under the capsule measuring 1 cm. The spleen and pancreas are normal in size. There is bilateral renal atrophy, worse on the right. There is a 3.5 cm right renal cyst. No hydronephrosis. The adrenal glands are normal  in size. No evidence of retroperitoneal adenopathy. No distended bowel loops.    Pelvis: No appendix is seen. No inflammatory changes are seen around the cecum. There  is sigmoid diverticulosis. No acute inflammatory changes are seen. No enlarged pelvic lymph nodes are seen. There is subcutaneous streaking of fat in the right gluteal  area that could represent either inflammatory change or hematoma. It is new since a pelvic CT on 922.      Impression:      Subcutaneous swelling in the gluteal area most likely representing a gluteal hematoma. This is a new finding since the recent pelvic CT. No acute or inflammatory changes are seen elsewhere in the abdomen or pelvis. Also noted is a hiatal hernia, renal  atrophy and diverticulosis.          Signer Name: Bubba Mancuso MD   Signed: 9/19/2022 10:05 PM   Workstation Name: [id number]    [facility]    CT Lower Extremity Right Without Contrast [[id number]] Collected: 09/19/22 1440     Updated: 09/19/22 1443    Narrative:      EXAMINATION: CT LOWER EXTREMITY RIGHT WO CONTRAST-      CLINICAL INDICATION: tib/fib (questionable tibial plateau and distal  tibia fracture fracture on x-ray)        COMPARISON: X-rays of the right lower extremity dated 09/19/2022     Radiation dose reduction techniques were utilized per ALARA protocol.  Automated exposure control was initiated through either or [employer] or  [employer] software packages by  protocol.           PROCEDURE: Axial images were acquired from the distal femur through the  ankle without any IV contrast. Reformatted images were created.     FINDINGS:  There is joint space narrowing at the knee.  Irregularity of the lateral tibial plateau probably due to an old injury  and or arthritic change, but no convincing evidence of acute fracture on  the presented study     Otherwise no evidence of acute fracture.       Impression:      1. Extensive arthritic change at the knee, but no acute fracture is seen        This report was finalized on 9/19/2022 2:41 PM by Dr. Bob Lovell MD.       XR Hip With or Without Pelvis 2 - 3 View Right [[id number]] Collected:  09/19/22 1306     Updated: 09/19/22 1332    Narrative:      EXAM:    XR Right Hip With Pelvis When Performed, 2 or 3 Views     EXAM DATE:    9/19/2022 12:11 PM     CLINICAL HISTORY:    trauma     TECHNIQUE:    Two or three views of the right hip with pelvis when performed.     COMPARISON:    No relevant prior studies available.     FINDINGS:    Bones/joints:  Unremarkable.  No acute fracture.  No dislocation.    Soft tissues:  Unremarkable.       Impression:        No acute findings in the right hip.     This report was finalized on 9/19/2022 1:07 PM by Dr. Bob Lovell MD.       XR Femur 2 View Right [[id number]] Collected: 09/19/22 1306     Updated: 09/19/22 1308    Narrative:      EXAM:    XR Right Femur, 2 Views     EXAM DATE:    9/19/2022 12:12 PM     CLINICAL HISTORY:    fall     TECHNIQUE:    Frontal and lateral views of the right femur.     COMPARISON:    No relevant prior studies available.     FINDINGS:    Bones/joints:  Unremarkable.  No acute fracture.  No dislocation.    Soft tissues:  Unremarkable.       Impression:        No acute findings in the right femur.     This report was finalized on 9/19/2022 1:06 PM by Dr. Bob Lovell MD.       XR Tibia Fibula 2 View Right [[id number]] Collected: 09/19/22 1305     Updated: 09/19/22 1308    Narrative:      EXAM:    XR Right Tibia and Fibula, 2 Views     EXAM DATE:    9/19/2022 12:11 PM     CLINICAL HISTORY:    fall     TECHNIQUE:    Frontal and lateral views of the right tibia and fibula.     COMPARISON:    No relevant prior studies available.     FINDINGS:    Bones/joints:  Mild irregularity of the lateral tibial plateau. Cannot  exclude nondisplaced fracture.  No dislocation.    Soft tissues:  Unremarkable.  No radiopaque foreign body.    Other findings:  Demineralization.       Impression:        Mild irregularity of the lateral tibial plateau. Cannot exclude  nondisplaced fracture.     This report was finalized on 9/19/2022 1:06 PM by Dr. Bob Lovell  MD.       XR Ankle 3+ View Right [[id number]] Collected: 09/19/22 1304     Updated: 09/19/22 1307    Narrative:      EXAM:    XR Right Ankle Complete, 3 or More Views     EXAM DATE:    9/19/2022 12:11 PM     CLINICAL HISTORY:    fall     TECHNIQUE:    Frontal, lateral and oblique views of the right ankle.     COMPARISON:    No relevant prior studies available.     FINDINGS:    Bones/joints:  Oblique lucency through the distal tibia medially may  represent a nondisplaced fracture.  Old fracture of the distal fibula.   No dislocation.    Soft tissues:  Unremarkable.       Impression:        Oblique lucency through the distal tibia medially may represent a  nondisplaced fracture.     This report was finalized on 9/19/2022 1:05 PM by Dr. Bob Lovell MD.       XR Foot 3+ View Right [[id number]] Collected: 09/19/22 1304     Updated: 09/19/22 1306    Narrative:      EXAM:    XR Right Foot Complete, 3 or More Views     EXAM DATE:    9/19/2022 12:11 PM     CLINICAL HISTORY:    fall     TECHNIQUE:    Frontal, lateral and oblique views of the right foot.     COMPARISON:    No relevant prior studies available.     FINDINGS:    Bones/joints:  Unremarkable.  No acute fracture.  No dislocation.    Soft tissues:  Unremarkable.  No radiopaque foreign body.       Impression:        No acute findings in the right foot.     This report was finalized on 9/19/2022 1:04 PM by Dr. Bob Lovell MD.           Last echocardiogram:  Results for orders placed during the hospital encounter of 12/26/17    Adult Transthoracic Echo Limited W/ Cont if Necessary Per Protocol    Interpretation Summary  · The study is technically adequate for diagnosis.  · Left ventricular systolic function is low normal. Estimated EF = 50%.  · Right ventricular cavity is mildly dilated.  · Left atrial cavity size is moderate-to-severely dilated.  · Right atrial cavity size is moderately dilated. The inferior vena cava is dilated. An electronic lead is present in the  right atrium.  · There is no evidence of pericardial effusion. There is a small size left pleural effusion.    Compared to the study of 7/28/2017, the EF appears improved.    I have personally reviewed the above radiology images and read the final radiology report on 09/20/22  ---------------------------------------------------------------------------------------------------------------------  Assessment / Plan     Active Hospital Problems    Diagnosis  POA   • **Anemia, unspecified type [D64.9]  Yes       ASSESSMENT/PLAN:  -Acute life-threatening anemia likely secondary to large gluteal hematoma, POA  -History of acute blood loss anemia in 2018 suspected to be from diverticular bleed   -History of iron deficiency anemia   -Hemoglobin and hematocrit upon arrival 6.3 and 19.6 respectively; previously during ED visit on 09/08 hemoglobin 12.4 and hematocrit 38.8.   -Last colonoscopy/EGD in 2018 during [facility] admission. EGD showed no active bleeding but evidence of gastritis; Colonoscopy revealed a few polyps that were not removed or biopsied, also found to have diverticulosis and likely diverticular bleed since resolved.  -Reports episodes of melena at home; will monitor if patient has additional episodes of melena during admission.   -ED provider ordered 2 U PRBC's; nursing communication in chart to order repeat CBC after first unit of blood.   -Obtain iron, vitamin B12, and folate.    -Possible acute, nondisplaced, right lateral tibial plateau and distal tibia fractures, POA  -Traumatic right gluteal hematoma, POA  -Acute superior endplate compression fracture of L1 with retropulsion (09/08/22)  -Leukocytosis, likely reactive to fall  -Mechanical fall  -XR of right lower extremity with findings noted above.   -CT abdomen/pelvis with subcutaneous swelling swelling in gluteal area representing gluteal hematoma.   -Vertebral fracture found during 09/08/22 ED visit 2/2 fall and was discharged home with back brace with  instruction to follow-up with orthopedic surgery, but missed appointment due to transportation.   -Inpatient orthopedic and general surgery consulted, input and assistance much appreciated.   -Will keep NPO pending surgical evaluation.   -Up with assistance, fall precautions.   -Neurovascular checks every 4 hours.   -PT/OT consults ordered.     -Acute on chronic kidney disease stage IV, POA  -Bilateral renal atrophy, R > L   -Right renal cyst (3.5 cm)  -Anion-gap metabolic acidosis   -Lactic acidosis   -Mild hyperkalemia   -Creatinine upon arrival 6.11; previously during ED visit on 09/08 creatinine 2.45; baseline appears to be 2.1-2.4.   -CT A/P reviewed with findings noted above.   -Lactate 2.3 upon arrival, but has normalized after fluids.   -Received 500 mL fluid bolus in ED; will hold on additional fluids at this time as 2 units of blood to be transfused.   -Inpatient nephrology consulted, input and assistance much appreciated.   -Obtain creatinine kinase to evaluate for rhabdomyolysis in setting of fall.   -Obtain urine electrolytes and urine protein/creatinine ratio.   -Avoid nephrotoxins as able; planning to hold home Lasix and Neurontin.     -Supratherapeutic INR on chronic anticoagulation with Coumadin (goal INR 2.0-3.0)  -History of blood clots, details unknown  -Paroxysmal atrial fibrillation   -SSS s/p pacemaker placement  -PT and PTT > 100 and INR > 10 upon arrival.   -Warfarin reversal initiated in ED with IV Vitamin K 10 units and IV KCentra 3630 units.   -Daily PT/INR ordered.   -Will hold Coumadin for now.     -Chest pain with typical features, POA  -Coronary artery disease s/p stenting   -Reports intermittent episodes of midsternal chest pain at home.   -Troponin T 0.036; continue to trend.   -EKG without acute ischemic abnormalities present.   -Obtain a.m. TTE.   -May consider cardiology consult.     -Acute urinary tract infection, POA  -Reported dysuria  -UA with 3 + leukocytes, 21-30 WBC, 2+  bacteria, 36 comets epithelium  -Pending urine culture.  -Received IV Rocephin in ED; will continue on admission as patient is symptomatic.    -Chronic diastolic CHF  -Echo from 12/2017 reviewed and noted above.   -Appears euvolemic on exam.   -Obtain TTE as noted above.   -Monitor with strict I/O's and daily weights.     -Generalized anxiety disorder  -Supportive care.   -Review home medications once reconciled.     -Debility  -Palliative care consult.   - consult.   ----------  -DVT prophylaxis: SCD's contraindicated 2/2 frature  -Activity: Up with assistance, fall precautions  -Expected length of stay:   INPATIENT status due to the need for care which can only be reasonably provided in an hospital setting such as aggressive/expedited ancillary services and/or consultation services, the necessity for IV medications, close physician monitoring and/or the possible need for procedures.  In such, I feel patient's risk for adverse outcomes and need for care warrant INPATIENT evaluation and predict the patient's care encounter to likely last beyond 2 midnights.    Level Of Support Discussed With: Patient  Code Status (Patient has no pulse and is not breathing): CPR (Attempt to Resuscitate)  Medical Interventions (Patient has pulse or is breathing): Full Support    High risk secondary to acute life-threatening anemia likely secondary to large gluteal hematoma, possible acute nondisplaced right lateral tibial plateau and distal tibia fractures, acute on chronic CKD stage IV, supratherapeutic INR     Disposition: Pending clinical course    Michelle Sellers PA-C  09/20/22  01:28 EDT    ---------------------------------------------------------------------------------------------------------------------           Electronically signed by Jannet Lagunas MD at 09/20/22 0535         Current Facility-Administered Medications   Medication Dose Route Frequency Provider Last Rate Last Admin   • docusate sodium  (COLACE) capsule 100 mg  100 mg Oral BID Mustapha Loredo, DO   100 mg at 10/01/22 0846   • escitalopram (LEXAPRO) tablet 10 mg  10 mg Oral Daily Mustapha Loredo, DO   10 mg at 10/05/22 0830   • furosemide (LASIX) tablet 40 mg  40 mg Oral BID Ankit Tai PA-C   40 mg at 10/05/22 0830   • gabapentin (NEURONTIN) capsule 300 mg  300 mg Oral Nightly GertrudeMustapha navarro, DO   300 mg at 09/29/22 2030   • heparin (porcine) 5000 UNIT/ML injection 5,000 Units  5,000 Units Subcutaneous Q12H GertrudeMustapha navarro DO   5,000 Units at 10/05/22 0830   • hydrALAZINE (APRESOLINE) tablet 50 mg  50 mg Oral Q8H GertrudeMustapha harris, DO   50 mg at 10/05/22 0640   • HYDROcodone-acetaminophen (NORCO) 7.5-325 MG per tablet 1 tablet  1 tablet Oral Q8H PRN Mustapha Loredo, DO   1 tablet at 09/29/22 0602   • ipratropium-albuterol (DUO-NEB) nebulizer solution 3 mL  3 mL Nebulization 4x Daily - RT Mustapha Loredo DO   3 mL at 10/05/22 0729   • melatonin tablet 10 mg  10 mg Oral Nightly Mustapha Loredo DO   10 mg at 10/04/22 2101   • metoprolol tartrate (LOPRESSOR) tablet 12.5 mg  12.5 mg Oral Q12H Mustapha Loredo DO   12.5 mg at 10/05/22 0830   • ondansetron (ZOFRAN) injection 4 mg  4 mg Intravenous Q6H PRN Ankit Tai PA-C       • pantoprazole (PROTONIX) EC tablet 40 mg  40 mg Oral Q AM Mustapha Loredo, DO   40 mg at 10/05/22 0640   • phenol (CHLORASEPTIC) 1.4 % liquid 1 spray  1 spray Mouth/Throat Q2H PRN Mustapha Loredo DO   1 spray at 09/21/22 0943   • polyethylene glycol (MIRALAX) packet 17 g  17 g Oral BID PRN Liyah Jaquez APRN       • prochlorperazine (COMPAZINE) injection 5 mg  5 mg Intravenous Q6H PRN Ankit Tai PA-C        Or   • prochlorperazine (COMPAZINE) tablet 5 mg  5 mg Oral Q6H PRN Ankit Tai PA-C        Or   • prochlorperazine (COMPAZINE) suppository 25 mg  25 mg Rectal Q12H PRN Ankit Tai,  "TEO       • promethazine (PHENERGAN) 6.25 mg in sodium chloride 0.9 % 50 mL  6.25 mg Intravenous Q6H PRN Gertrude, Christopher A, DO   6.25 mg at 09/21/22 1124   • sodium chloride 0.9 % flush 10 mL  10 mL Intravenous PRN Gertrude, Christopher A, DO       • sodium chloride 0.9 % flush 10 mL  10 mL Intravenous Q12H Gertrude, Christopher A, DO   10 mL at 10/05/22 0837   • sodium chloride 0.9 % flush 10 mL  10 mL Intravenous PRN Gertrude, Christopher A, DO       • sodium chloride 0.9 % flush 10 mL  10 mL Intravenous Q12H Gertrude, Christopher A, DO   10 mL at 10/05/22 0832   • sodium chloride 0.9 % flush 10 mL  10 mL Intravenous PRN Gertrude, Christopher A, DO            Physician Progress Notes (most recent note)      Liyah Jaquez APRN at 10/04/22 1115          Palliative Care Daily Progress Note     S: Medical record reviewed, followed up with Primary RN Rena and Dr Lagunas regarding patient's condition. When palliative care entered the room Tre was laying in bed resting quietly. We asked her if she ate any of her breakfast, she said no I didn't like it, we told her that she could ask for something else and she just shook her head no. I asked her if she was in pain anywhere, she said sometimes her rt leg bothers her but not at the present time. I did ask her if she had left leg pain she said she did not right now, however that it sometimes bothered her, that it would feel achy. She denied nausea at this time as well as denied Shortness of breath. I asked her if she had been using her incentive spirometer as she was coughing slightly when I was in room and she said no she did not feel like it. When I asked her if she wanted to get better and out of the hospital, she just shrugged her shoulders.      O:   Palliative Performance Scale Score:     BP (!) 194/86 (BP Location: Right arm, Patient Position: Lying) Comment: rn aware  Pulse 99   Temp 98.1 °F (36.7 °C) (Oral)   Resp 18   Ht 170.2 cm (67\")   Wt 77.5 kg " (170 lb 14.4 oz)   SpO2 96%   BMI 26.77 kg/m²     Intake/Output Summary (Last 24 hours) at 10/4/2022 1327  Last data filed at 10/4/2022 1030  Gross per 24 hour   Intake 120 ml   Output 1540 ml   Net -1420 ml       PE:  General Appearance:    Awake, alert, cooperative, NAD   HEENT:    NC/AT, without obvious abnormality, EOMI, anicteric    Neck:   supple, trachea midline, no JVD   Lungs:     CTAB without w/r/r    Heart:    RRR, normal S1 and S2, no M/R/G   Abdomen:     Soft, Tender lower abdomen ND, NABS    Extremities:   Moves all extremities weakly  Trace BLE edema   Pulses:   Pulses palpable and equal bilaterally   Skin:   Pale, warm ,dry and bruised d/t falls   Neurologic:   A/Ox3, cooperative   Psych:   Calm, flat            Meds: Reviewed and changes noted.    Labs:   Results from last 7 days   Lab Units 10/04/22  0116   WBC 10*3/mm3 5.82   HEMOGLOBIN g/dL 8.9*   HEMATOCRIT % 28.7*   PLATELETS 10*3/mm3 250     Results from last 7 days   Lab Units 10/04/22  0116   SODIUM mmol/L 139   POTASSIUM mmol/L 3.6   CHLORIDE mmol/L 102   CO2 mmol/L 25.0   BUN mg/dL 23   CREATININE mg/dL 1.92*   GLUCOSE mg/dL 107*   CALCIUM mg/dL 9.1     Results from last 7 days   Lab Units 10/04/22  0116   SODIUM mmol/L 139   POTASSIUM mmol/L 3.6   CHLORIDE mmol/L 102   CO2 mmol/L 25.0   BUN mg/dL 23   CREATININE mg/dL 1.92*   CALCIUM mg/dL 9.1   BILIRUBIN mg/dL 0.6   ALK PHOS U/L 64   ALT (SGPT) U/L 7   AST (SGOT) U/L 13   GLUCOSE mg/dL 107*     Imaging Results (Last 72 Hours)     Procedure Component Value Units Date/Time    XR Chest 1 View [[id number]] Collected: 10/01/22 1403     Updated: 10/01/22 1405    Narrative:      CR Chest 1 Vw    INDICATION:   Worsening dyspnea.     COMPARISON:    Chest 9/29/2022    FINDINGS:  Portable AP view(s) of the chest.    Persistent bibasilar atelectasis/infiltrate. No large pleural effusions. Cardiomegaly stable. No pneumothorax. Left-sided pacing complex.       Impression:      No change from  9/29/2022.    Signer Name: Marvin Edward MD   Signed: 10/1/2022 2:03 PM   Workstation Name: [email or web address]    [facility]            Diagnostics: Reviewed    A: Tre Kinney is a 89 y.o. female admitted on 9/19/2022 due to a fall. She has a medical history CHF, chronic kidney disease, coronary artery disease s/p stenting, history of blood clots, chronic anticoagulation with Coumadin, SSS s/p pacemaker placement, chronic diastolic CHF, and iron deficiency anemia who presents today with a chief complaint of a fall. Per pt she has become progressively more weak and has experienced several falls. Pt has sustained a moderate acute superior endplate vertebral compression fracture at the L1 with about 40% loss of vertebral body height and mild posterior osseous retropulsion of about 6 mm and also noted to have chronic compression fracture deformities involving the inferior endplates of L3 and L4 due to her first fall and from her second fall she does have a large hematoma on her right side, which led her to this admission. Pt had a hemoglobin of 6.1 for which she received 2 units of PRBC's. Pt denies any known source of bleeding and does say she had a scope(EGD colonoscopy at [facility] in 2018.  Today 10/4/2022  Pt was awake alert and oriented, again seemed in more of a down mood, Temp 98.1, HR 99 /86 received her Hydralazine, RR 18 and was saturating 96% on room air and in no distress.    P:  I was able to discuss with Tre again regarding her goals of care for herself. She has not been motivated to eat or to put forth the effort to work with therapy. I asked her why she did not want to do the things that would help her to improve and to be able to go to rehab and then hopefully home, she stated I just don't want to. We did re discuss her GOC for herself pertaining to her code status, she stated that she no longer wants to be resuscitated or to be put on a ventilator. I did discuss this  conversation with her son Leno and he supports what her wishes are. Leno and the family are concerned about their mothers lack of motivation and why this changed. I did change her code status to DNR/DNI and let Rena MASTERS know. I will discuss patient with Dr Lagunas.    We will continue to follow along. Please do not hesitate to contact us regarding further sx mgmt or GOC needs, including after hours or on weekends via our on call provider at [phone].     LANCE Cantu    10/4/2022    Electronically signed by Liyah Jaquez APRN at 10/04/22 1405          Consult Notes (most recent note)      Manjeet Xiong APRN at 22 0958      Consult Orders    1. Inpatient Orthopedic Surgery Consult [[id number]] ordered by Michelle Sellers PA-C          Attestation signed by Andrzej Ho MD at 22 1609    I have reviewed this documentation and agree.             Summary:Right knee and ankle pain                 Inpatient Orthopedic Surgery Consult  Consult performed by: Manjeet Xiong APRN  Consult ordered by: Michelle Sellers PA-C          Patient Identification:  Name:  Tre Kinney  Age:  89 y.o.  Sex:  female  :  1932  MRN:  [id number]  Visit Number:  [id number]  Primary care provider:  Jessica Pedro APRN    History of presenting illness:  This is a pleasant 89 year old female who presented to the emergency department via ambulance yesterday following a fall she sustained 6 days ago at home.  Patient notes a recent history of frequent falls.  Approximately 1 month ago, she fell sustaining a compression fracture at L1.  The son notes she is following outpatient with Orthopedics for this injury.  The patient also notes a fall 6 days ago at her bedside.  She is unable to explain how she fell but notes she landed on her right side.  She has had continued pain to the right buttock and leg since that fall with difficulty weight bearing.  The son notes that she was unable to  stand yesterday and has been weak from not eating so an ambulance was called to transport her to the ER.  He notes that she has a cane, walker, and wheelchair at home, however she does not use them because her home is small.  She currently lives at home with her older son.      ---------------------------------------------------------------------------------------------------------------------    Review of Systems   Constitutional: Positive for activity change, appetite change and fatigue.   Musculoskeletal: Positive for gait problem.        Pain to the right leg and buttock with weight bearing.   Skin: Positive for color change.        Ecchymosis to the right arm, buttock, and leg.   All other systems reviewed and are negative.     ---------------------------------------------------------------------------------------------------------------------   Past History:  Family History   Problem Relation Age of Onset   • Other Mother         cardiac disorder   • Hypertension Mother    • Heart disease Mother    • Thyroid disease Mother    • Other Father         cardiac disorder   • Heart disease Father    • Hypertension Father    • Other Sister         cardiac disorder   • Other Brother         cardiac disorder   • Other Other         cardiac disorder   • Other Other         cardiac disorder     Past Medical History:   Diagnosis Date   • Anemia    • Arthritis    • CHF (congestive heart failure) (HCC)    • Chronic kidney disease    • Coronary artery disease    • H/O blood clots    • History of transfusion     no reaction    • Hypertension      Past Surgical History:   Procedure Laterality Date   • ABDOMINAL SURGERY     • APPENDECTOMY     • CARDIAC SURGERY     •  SECTION     • COLONOSCOPY N/A 3/7/2018    Procedure: COLONOSCOPY;  Surgeon: Willie Gresham MD;  Location: [facility] ENDOSCOPY;  Service:    • CORONARY ANGIOPLASTY WITH STENT PLACEMENT     • ENDOSCOPY N/A 3/6/2018    Procedure: ESOPHAGOGASTRODUODENOSCOPY;   Surgeon: Willie Gresham MD;  Location: [facility] ENDOSCOPY;  Service:    • PACEMAKER IMPLANTATION      left side    • SKIN BIOPSY     • TUBAL ABDOMINAL LIGATION       Social History     Socioeconomic History   • Marital status:    Tobacco Use   • Smoking status: Never Smoker   Substance and Sexual Activity   • Alcohol use: No   • Drug use: No   • Sexual activity: Defer     ---------------------------------------------------------------------------------------------------------------------   Allergies:  Adhesive tape  ---------------------------------------------------------------------------------------------------------------------   Prior to Admission Medications     Prescriptions Last Dose Informant Patient Reported? Taking?    ALPRAZolam (XANAX) 1 MG tablet 9/18/2022 Self Yes Yes    Take 1 mg by mouth 2 (Two) Times a Day As Needed for Anxiety.    aspirin 81 MG EC tablet 9/18/2022 Self Yes Yes    Take 81 mg by mouth Daily.    escitalopram (LEXAPRO) 10 MG tablet 9/18/2022 Self Yes Yes    Take 10 mg by mouth Daily.    furosemide (LASIX) 40 MG tablet 9/18/2022 Self Yes Yes    Take 40 mg by mouth Daily.    hydrALAZINE (APRESOLINE) 100 MG tablet 9/18/2022 Self No Yes    Take 1 tablet by mouth Every 8 (Eight) Hours.    HYDROcodone-acetaminophen (NORCO) 7.5-325 MG per tablet 9/18/2022 Self Yes Yes    Take 1 tablet by mouth Every 8 (Eight) Hours As Needed for Moderate Pain.    isosorbide mononitrate (IMDUR) 60 MG 24 hr tablet 9/18/2022 Self Yes Yes    Take 60 mg by mouth Daily.    metoprolol tartrate (LOPRESSOR) 50 MG tablet 9/18/2022 Self Yes Yes    Take 50 mg by mouth 2 (Two) Times a Day.    pantoprazole (PROTONIX) 40 MG EC tablet 9/18/2022 Self Yes Yes    Take 40 mg by mouth Daily.    potassium chloride (MICRO-K) 10 MEQ CR capsule 9/18/2022 Self Yes Yes    Take 10 mEq by mouth 2 (Two) Times a Day.    warfarin (COUMADIN) 6 MG tablet 9/18/2022 Self Yes Yes    Take 6 mg by mouth Daily.    gabapentin (NEURONTIN) 400  MG capsule  Self Yes No    Take 400 mg by mouth Every Night.        [facility] Meds:  cefTRIAXone, 1 g, Intravenous, Q24H  escitalopram, 10 mg, Oral, Daily  gabapentin, 300 mg, Oral, Nightly  polyethylene glycol, 17 g, Oral, BID  sodium chloride, 10 mL, Intravenous, Q12H      sodium bicarbonate drip less than/equal to 75 mEq/bag, 75 mEq, Last Rate: 75 mEq (09/21/22 0944)      ---------------------------------------------------------------------------------------------------------------------   Vital Signs:  Temp:  [97.5 °F (36.4 °C)-98.2 °F (36.8 °C)] 97.8 °F (36.6 °C)  Heart Rate:  [70-89] 73  Resp:  [18-21] 21  BP: (104-157)/() 148/54      09/19/22  1052 09/20/22  0144 09/20/22  0400   Weight: 72.6 kg (160 lb) 75 kg (165 lb 5.5 oz) 75 kg (165 lb 5.5 oz)     Body mass index is 25.9 kg/m².  ---------------------------------------------------------------------------------------------------------------------     Physical exam:  Constitutional:  Chronically ill appearing.  No respiratory distress.      HENT:  Head: Normocephalic and atraumatic.  Mouth:  Moist mucous membranes.    Eyes:  Conjunctivae are normal.  Pupils are equal, round, and reactive to light.  No scleral icterus.  Neck:  Neck supple.  Trachea midline.  Cardiovascular:  Normal rate and regular rhythm.  Pulmonary/Chest:  No respiratory distress and good air movement.  Abdominal:  Soft.  No distension and no tenderness.   Musculoskeletal:  Upon examination of the right knee, there is minimal edema, no tenderness, no erythema, no ecchymosis, and full ROM.  Upon examination of the right ankle, there is minimal, generalized edema, no erythema, slight tenderness over the medial malleolus.  (+) Jaky/plantar flexion at the ankle.  ROM is not painful.  Capillary refill is brisk and light touch sensation is intact, distally.  DP pulse (+).    Neurological:  Alert and oriented to person, place, and time.     Skin:  Skin is warm and dry.  No rash noted.   Ecchymosis noted to the right buttock, right thigh, right ankle, and right arm.   Psychiatric:  Normal mood and affect.  Behavior is normal.  Judgment and thought content normal.   Peripheral vascular:  No pitting edema and strong pulses on all 4 extremities.    ---------------------------------------------------------------------------------------------------------------------   .  ---------------------------------------------------------------------------------------------------------------------   Results from last 7 days   Lab Units 09/21/22  0352 09/20/22  0538 09/20/22  0433 09/19/22  2350 09/19/22  2124 09/19/22  1608 09/19/22  1521   LACTATE mmol/L  --   --   --   --  1.3 2.3*  --    WBC 10*3/mm3 7.32  --  11.33*  --   --   --  15.87*   HEMOGLOBIN g/dL 6.6*  --  7.8*  --   --   --  6.3*   HEMATOCRIT % 19.9*  --  23.8*  --   --   --  19.6*   MCV fL 85.8  --  87.5  --   --   --  90.7   MCHC g/dL 33.2  --  32.8  --   --   --  32.1   PLATELETS 10*3/mm3 152  --  180  --   --   --  250   INR  1.03 1.02  --  >10.00*  --   --   --          Results from last 7 days   Lab Units 09/21/22  0352 09/20/22  0433 09/19/22  1521   SODIUM mmol/L 135* 130* 130*   POTASSIUM mmol/L 3.9 4.8 5.5*   MAGNESIUM mg/dL  --  2.5*  --    CHLORIDE mmol/L 98 96* 93*   CO2 mmol/L 22.0 16.1* 17.0*   BUN mg/dL 86* 92* 91*   CREATININE mg/dL 4.64* 5.84* 6.11*   CALCIUM mg/dL 8.3* 8.5* 9.0   GLUCOSE mg/dL 99 100* 137*   ALBUMIN g/dL  --  3.19* 3.43*   BILIRUBIN mg/dL  --  1.0 0.9   ALK PHOS U/L  --  64 77   AST (SGOT) U/L  --  17 19   ALT (SGPT) U/L  --  8 8   Estimated Creatinine Clearance: 8.7 mL/min (A) (by C-G formula based on SCr of 4.64 mg/dL (H)).  No results found for: AMMONIA  Results from last 7 days   Lab Units 09/21/22  0352 09/20/22  2140 09/20/22  1659 09/20/22  0433 09/19/22  1521   CK TOTAL U/L  --   --   --   --  348*   TROPONIN T ng/mL 0.042* 0.039* 0.042*   < > 0.036*    < > = values in this interval not displayed.      Results from last 7 days   Lab Units 09/19/22  1521   PROBNP pg/mL 11,666.0*     No results found for: HGBA1C  Lab Results   Component Value Date    TSH 4.999 (H) 11/10/2017     No results found for: PREGTESTUR, PREGSERUM, HCG, HCGQUANT  Pain Management Panel     Pain Management Panel Latest Ref Rng & Units 9/20/2022 7/24/2019    CREATININE UR mg/dL 110.9 48.9    AMPHETAMINES SCREEN, URINE Negative - -    BARBITURATES SCREEN Negative - -    BENZODIAZEPINE SCREEN, URINE Negative - -    COCAINE SCREEN, URINE Negative - -    METHADONE SCREEN, URINE Negative - -        Blood Culture   Date Value Ref Range Status   09/19/2022 No growth at 24 hours  Preliminary   09/19/2022 No growth at 24 hours  Preliminary     Urine Culture   Date Value Ref Range Status   09/19/2022 <25,000 CFU/mL Mixed Carolyn Isolated  Final     No results found for: WOUNDCX  No results found for: STOOLCX      ---------------------------------------------------------------------------------------------------------------------   Imaging Results (Last 7 Days)     Procedure Component Value Units Date/Time    CT Abdomen Pelvis Without Contrast [[id number]] Collected: 09/19/22 2205     Updated: 09/19/22 2207    Narrative:      CT Abdomen Pelvis WO    INDICATION:   Abdominal pain.    TECHNIQUE:   CT of the abdomen and pelvis without IV contrast. Coronal and sagittal reconstructions were obtained.  Radiation dose reduction techniques included automated exposure control or exposure modulation based on body size. Count of known CT and cardiac nuc  med studies performed in previous 12 months: 2.     COMPARISON:   None available.    FINDINGS:  Abdomen: There is mild scarring and volume loss at the left lung base. There is a moderate-sized hiatal hernia. The liver contains a small cyst or hemangioma in the right lobe measuring 1.5 cm in diameter with a similar benign-appearing lesion in the  lateral segment of the left lobe just under the capsule measuring 1 cm.  The spleen and pancreas are normal in size. There is bilateral renal atrophy, worse on the right. There is a 3.5 cm right renal cyst. No hydronephrosis. The adrenal glands are normal  in size. No evidence of retroperitoneal adenopathy. No distended bowel loops.    Pelvis: No appendix is seen. No inflammatory changes are seen around the cecum. There is sigmoid diverticulosis. No acute inflammatory changes are seen. No enlarged pelvic lymph nodes are seen. There is subcutaneous streaking of fat in the right gluteal  area that could represent either inflammatory change or hematoma. It is new since a pelvic CT on 922.      Impression:      Subcutaneous swelling in the gluteal area most likely representing a gluteal hematoma. This is a new finding since the recent pelvic CT. No acute or inflammatory changes are seen elsewhere in the abdomen or pelvis. Also noted is a hiatal hernia, renal  atrophy and diverticulosis.          Signer Name: Bubba Mancuso MD   Signed: 9/19/2022 10:05 PM   Workstation Name: [id number]    [facility]    CT Lower Extremity Right Without Contrast [[id number]] Collected: 09/19/22 1440     Updated: 09/19/22 1443    Narrative:      EXAMINATION: CT LOWER EXTREMITY RIGHT WO CONTRAST-      CLINICAL INDICATION: tib/fib (questionable tibial plateau and distal  tibia fracture fracture on x-ray)        COMPARISON: X-rays of the right lower extremity dated 09/19/2022     Radiation dose reduction techniques were utilized per ALARA protocol.  Automated exposure control was initiated through either or CareDose or  DoseRight software packages by  protocol.           PROCEDURE: Axial images were acquired from the distal femur through the  ankle without any IV contrast. Reformatted images were created.     FINDINGS:  There is joint space narrowing at the knee.  Irregularity of the lateral tibial plateau probably due to an old injury  and or arthritic change, but no convincing  evidence of acute fracture on  the presented study     Otherwise no evidence of acute fracture.       Impression:      1. Extensive arthritic change at the knee, but no acute fracture is seen        This report was finalized on 9/19/2022 2:41 PM by Dr. Bob Lovell MD.       XR Hip With or Without Pelvis 2 - 3 View Right [[id number]] Collected: 09/19/22 1306     Updated: 09/19/22 1332    Narrative:      EXAM:    XR Right Hip With Pelvis When Performed, 2 or 3 Views     EXAM DATE:    9/19/2022 12:11 PM     CLINICAL HISTORY:    trauma     TECHNIQUE:    Two or three views of the right hip with pelvis when performed.     COMPARISON:    No relevant prior studies available.     FINDINGS:    Bones/joints:  Unremarkable.  No acute fracture.  No dislocation.    Soft tissues:  Unremarkable.       Impression:        No acute findings in the right hip.     This report was finalized on 9/19/2022 1:07 PM by Dr. Bob Lovell MD.       XR Femur 2 View Right [[id number]] Collected: 09/19/22 1306     Updated: 09/19/22 1308    Narrative:      EXAM:    XR Right Femur, 2 Views     EXAM DATE:    9/19/2022 12:12 PM     CLINICAL HISTORY:    fall     TECHNIQUE:    Frontal and lateral views of the right femur.     COMPARISON:    No relevant prior studies available.     FINDINGS:    Bones/joints:  Unremarkable.  No acute fracture.  No dislocation.    Soft tissues:  Unremarkable.       Impression:        No acute findings in the right femur.     This report was finalized on 9/19/2022 1:06 PM by Dr. Bob Lovell MD.       XR Tibia Fibula 2 View Right [[id number]] Collected: 09/19/22 1305     Updated: 09/19/22 1308    Narrative:      EXAM:    XR Right Tibia and Fibula, 2 Views     EXAM DATE:    9/19/2022 12:11 PM     CLINICAL HISTORY:    fall     TECHNIQUE:    Frontal and lateral views of the right tibia and fibula.     COMPARISON:    No relevant prior studies available.     FINDINGS:    Bones/joints:  Mild irregularity of the lateral tibial  plateau. Cannot  exclude nondisplaced fracture.  No dislocation.    Soft tissues:  Unremarkable.  No radiopaque foreign body.    Other findings:  Demineralization.       Impression:        Mild irregularity of the lateral tibial plateau. Cannot exclude  nondisplaced fracture.     This report was finalized on 9/19/2022 1:06 PM by Dr. Bob Lovell MD.       XR Ankle 3+ View Right [[id number]] Collected: 09/19/22 1304     Updated: 09/19/22 1307    Narrative:      EXAM:    XR Right Ankle Complete, 3 or More Views     EXAM DATE:    9/19/2022 12:11 PM     CLINICAL HISTORY:    fall     TECHNIQUE:    Frontal, lateral and oblique views of the right ankle.     COMPARISON:    No relevant prior studies available.     FINDINGS:    Bones/joints:  Oblique lucency through the distal tibia medially may  represent a nondisplaced fracture.  Old fracture of the distal fibula.   No dislocation.    Soft tissues:  Unremarkable.       Impression:        Oblique lucency through the distal tibia medially may represent a  nondisplaced fracture.     This report was finalized on 9/19/2022 1:05 PM by Dr. Bob Lovell MD.       XR Foot 3+ View Right [[id number]] Collected: 09/19/22 1304     Updated: 09/19/22 1306    Narrative:      EXAM:    XR Right Foot Complete, 3 or More Views     EXAM DATE:    9/19/2022 12:11 PM     CLINICAL HISTORY:    fall     TECHNIQUE:    Frontal, lateral and oblique views of the right foot.     COMPARISON:    No relevant prior studies available.     FINDINGS:    Bones/joints:  Unremarkable.  No acute fracture.  No dislocation.    Soft tissues:  Unremarkable.  No radiopaque foreign body.       Impression:        No acute findings in the right foot.     This report was finalized on 9/19/2022 1:04 PM by Dr. Bob Lovell MD.           ----------------------------------------------------------------------------------------------------------------------  Assessment and Plan:   1. Right ankle pain, possible distal tibia  fracture.  2. Right knee osteoarthritis.    Patient discussed with Dr. Ho.  He has reviewed the Xrays and CT scan and recommends non operative treatment.      As for the right knee pain, there is no fracture noted on Xray or CT.  Degenerative changes noted.  The patient is okay for weight bearing as tolerated to the knee.  OTC pain medication and ice may be helpful.    As for the right ankle, there is an old appearing fracture to the distal tibia, however we will repeat an ankle Xray this morning for better views to further evaluate.  Please keep the patient non weight bearing to the right ankle until Xrays are obtained and reviewed.  Further treatment planning pending review of the repeat Xray.          Thank you for the consult.    LANCE Villanueva  22  09:59 EDT    Electronically signed by Andrzej Ho MD at 22 1603       Nutrition Notes (most recent note)    No notes exist for this encounter.            Physical Therapy Notes (most recent note)      Bhargavi Vásquez, PT at 10/04/22 1356  Version 1 of 1         Acute Care - Physical Therapy Treatment Note  [facility]     Patient Name: Tre Kinney  : 1932  MRN: [id number]  Today's Date: 10/4/2022   Onset of Illness/Injury or Date of Surgery: 22  Visit Dx:     ICD-10-CM ICD-9-CM   1. Anemia, unspecified type  D64.9 285.9   2. Acute UTI  N39.0 599.0   3. Weakness  R53.1 780.79     Patient Active Problem List   Diagnosis   • Acid reflux   • Ankle arthralgia   • Cardiac conduction disorder   • Closed fracture of distal fibula   • Arteriosclerosis of coronary artery   • Fracture of distal end of tibia   • Elevated cholesterol   • BP (high blood pressure)   • Hypertrophic polyarthritis   • OP (osteoporosis)   • Right heart failure (HCC)   • Heart failure, chronic, right-sided (HCC)   • SOB (shortness of breath)   • Palpitations   • CHF (congestive heart failure) (HCC)   • Congestive heart failure (HCC)   • Precordial pain   •  Chronic anticoagulation   • Hypokalemia   • Anemia, unspecified type     Past Medical History:   Diagnosis Date   • Anemia    • Arthritis    • CHF (congestive heart failure) (HCC)    • Chronic kidney disease    • Coronary artery disease    • H/O blood clots    • History of transfusion     no reaction    • Hypertension      Past Surgical History:   Procedure Laterality Date   • ABDOMINAL SURGERY     • APPENDECTOMY     • CARDIAC SURGERY     •  SECTION     • COLONOSCOPY N/A 3/7/2018    Procedure: COLONOSCOPY;  Surgeon: Willie Gresham MD;  Location:  [facility];  Service:    • CORONARY ANGIOPLASTY WITH STENT PLACEMENT     • ENDOSCOPY N/A 3/6/2018    Procedure: ESOPHAGOGASTRODUODENOSCOPY;  Surgeon: Willie Gresham MD;  Location:  [facility];  Service:    • PACEMAKER IMPLANTATION      left side    • SKIN BIOPSY     • TUBAL ABDOMINAL LIGATION       PT Assessment (last 12 hours)     PT Evaluation and Treatment     Row Name 10/04/22 0490          Physical Therapy Time and Intention    Subjective Information complains of;weakness  -CT     Document Type therapy note (daily note)  -CT     Mode of Treatment physical therapy  -CT     Patient Effort good  -CT     Row Name 10/04/22 1350          General Information    Patient Profile Reviewed yes  -CT     Existing Precautions/Restrictions fall;TLSO  -CT     Row Name 10/04/22 1350          Cognition    Affect/Mental Status (Cognition) WFL  -CT     Orientation Status (Cognition) oriented x 3  -CT     Row Name 10/04/22 1354          Bed Mobility    Bed Mobility sit-supine;supine-sit;scooting/bridging;rolling left;rolling right  -CT     Rolling Left [place] (Bed Mobility) moderate assist (50% patient effort);2 person assist  -CT     Rolling Right [place] (Bed Mobility) moderate assist (50% patient effort);2 person assist  -CT     Scooting/Bridging [place] (Bed Mobility) moderate assist (50% patient effort)  -CT     Supine-Sit [place] (Bed  Mobility) moderate assist (50% patient effort);2 person assist;verbal cues;nonverbal cues (demo/gesture)  -CT     Sit-Supine [place] (Bed Mobility) moderate assist (50% patient effort);2 person assist;verbal cues;nonverbal cues (demo/gesture)  -CT     Bed Mobility, Safety Issues decreased use of arms for pushing/pulling;decreased use of legs for bridging/pushing;impaired trunk control for bed mobility  -CT     Assistive Device (Bed Mobility) bed rails;draw sheet  -CT     Row Name 10/04/22 1350          Transfers    Sit-Stand [place] (Transfers) moderate assist (50% patient effort);2 person assist;verbal cues  -CT     Stand-Sit [place] (Transfers) moderate assist (50% patient effort);2 person assist;verbal cues  -CT     Row Name 10/04/22 1350          Sit-Stand Transfer    Assistive Device (Sit-Stand Transfers) walker, front-wheeled  -CT     Row Name 10/04/22 1350          Stand-Sit Transfer    Assistive Device (Stand-Sit Transfers) walker, front-wheeled  -CT     Row Name 10/04/22 1359          Gait/Stairs (Locomotion)    [place] Level (Gait) moderate assist (50% patient effort);maximum assist (25% patient effort);2 person assist;verbal cues;nonverbal cues (demo/gesture)  -CT     Assistive Device (Gait) walker, front-wheeled  -CT     Distance in Feet (Gait) 2 side steps toward head of bed  -CT     Pattern (Gait) step-to  -CT     Deviations/Abnormal Patterns (Gait) gait speed decreased;festinating/shuffling;weight shifting decreased  -CT     Row Name 10/04/22 1350          Motor Skills    Therapeutic Exercise --  BLE sitting ther ex  -CT     Row Name 10/04/22 1354          Plan of Care Review    Plan of Care Reviewed With patient  -CT     Row Name 10/04/22 1355          Positioning and Restraints    Pre-Treatment Position in bed  -CT     Post Treatment Position bed  -CT     In Bed supine;call light within reach;encouraged to call for assist;exit alarm on  -CT           User Key  (r) = Recorded By,  (t) = Taken By, (c) = Cosigned By    Initials Name Provider Type    CT Bhargavi Vásquez, PT Physical Therapist                  PT Recommendation and Plan  Anticipated Discharge Disposition (PT): inpatient rehabilitation facility, other (see comments) (SNF vs acute inpatient rehab)  Planned Therapy Interventions (PT): balance training, bed mobility training, gait training, home exercise program, manual therapy techniques, motor coordination training, neuromuscular re-education, patient/family education, postural re-education, strengthening, transfer training  Therapy Frequency (PT): 2 times/wk (2-5 times/wk)  Plan of Care Reviewed With: patient       Time Calculation:    PT Charges     Row Name 10/04/22 1355             Time Calculation    PT Received On 10/04/22  -CT              Time Calculation- PT    Total Timed Code Minutes- PT 40 minute(s)  -CT            User Key  (r) = Recorded By, (t) = Taken By, (c) = Cosigned By    Initials Name Provider Type    CT Bhargavi Vásquez, PT Physical Therapist              Therapy Charges for Today     Code Description Service Date Service Provider Modifiers Qty    97403505575 HC PT THERAPEUTIC ACT EA 15 MIN 10/4/2022 Bhargavi Vásquez, PT GP 1    16940645033 HC PT THER PROC EA 15 MIN 10/4/2022 Bhargavi Vásquez, PT GP 1    96015656530 HC GAIT TRAINING EA 15 MIN 10/4/2022 Bhargavi Vásquez, PT GP 1          PT G-Codes  AM-PAC 6 Clicks Score (PT): 10    Bhargavi Vásquez PT  10/4/2022      Electronically signed by Bhargavi Vásquez PT at 10/04/22 1357       Erin Quevedo MSW       Case Management   Case Management/Social Work      Signed   Date of Service:  10/04/22 1640   Creation Time:  10/04/22 1640              Signed              Discharge Planning Assessment  [facility]     Patient Name: Ter Kinney                    MRN: [id number]  Today's Date: 10/4/2022                     Admit Date: 9/19/2022     Plan: Pt was discussed in [facility] today. SS discussed pt with  PT and rehab is recommended. SS spoke to pt and pt's son, Leno on this date. Pt and pt's son request a swing bed consult. SS notified Dr. Lagunas. SS to follow.             Discharge Plan            Row Name 10/04/22 1630             Plan      Plan Pt was discussed in [facility] today. SS discussed pt with PT and rehab is recommended. SS spoke to pt and pt's sonLeno on this date. Pt and pt's son request a swing bed consult. SS notified Dr. Lagunas. SS to follow.                      Continued Care and Services - Admitted Since 2022                 Destination                 Service Provider Request Status Selected Services Address Phone Fax Patient Preferred      [facility]  [facility] - Request Sent N/A [address] [facility] [address] [phone] --      [facility]  Declined  Bed not available N/A [address] [facility] [address] [phone] --      [facility]  Declined  Bed not available N/A [address] [facility] [address] [phone] --                       Expected Discharge Date and Time      Expected Discharge Date Expected Discharge Time     Oct 4, 2022            LENORE Koch                                   Occupational Therapy Notes (most recent note)      Татьяна Bah, OT at 10/04/22 1128          Acute Care - Occupational Therapy Treatment Note  [facility]     Patient Name: Tre Kinney  : 1932  MRN: [id number]  Today's Date: 10/4/2022  Onset of Illness/Injury or Date of Surgery: 22     Referring Physician: Verito    Admit Date: 2022       ICD-10-CM ICD-9-CM   1. Anemia, unspecified type  D64.9 285.9   2. Acute UTI  N39.0 599.0   3. Weakness  R53.1 780.79     Patient Active Problem List   Diagnosis   • Acid reflux   • Ankle arthralgia   • Cardiac conduction disorder   • Closed fracture of distal fibula   • Arteriosclerosis of coronary artery   • Fracture of distal end  of tibia   • Elevated cholesterol   • BP (high blood pressure)   • Hypertrophic polyarthritis   • OP (osteoporosis)   • Right heart failure (HCC)   • Heart failure, chronic, right-sided (HCC)   • SOB (shortness of breath)   • Palpitations   • CHF (congestive heart failure) (HCC)   • Congestive heart failure (HCC)   • Precordial pain   • Chronic anticoagulation   • Hypokalemia   • Anemia, unspecified type     Past Medical History:   Diagnosis Date   • Anemia    • Arthritis    • CHF (congestive heart failure) (HCC)    • Chronic kidney disease    • Coronary artery disease    • H/O blood clots    • History of transfusion     no reaction    • Hypertension      Past Surgical History:   Procedure Laterality Date   • ABDOMINAL SURGERY     • APPENDECTOMY     • CARDIAC SURGERY     •  SECTION     • COLONOSCOPY N/A 3/7/2018    Procedure: COLONOSCOPY;  Surgeon: Willie Gresham MD;  Location:  [facility];  Service:    • CORONARY ANGIOPLASTY WITH STENT PLACEMENT     • ENDOSCOPY N/A 3/6/2018    Procedure: ESOPHAGOGASTRODUODENOSCOPY;  Surgeon: Willie Gresham MD;  Location:  [facility];  Service:    • PACEMAKER IMPLANTATION      left side    • SKIN BIOPSY     • TUBAL ABDOMINAL LIGATION           OT ASSESSMENT FLOWSHEET (last 12 hours)     OT Evaluation and Treatment     Row Name 10/04/22 1125                   OT Time and Intention    Subjective Information complains of;weakness;fatigue  -LM        Document Type therapy note (daily note)  -LM        Mode of Treatment occupational therapy  -LM        Patient Effort good  -LM        Comment Patient seen this date for adl retraining/education, fxl mobility, bed mobility, toileting.  Patient required mod/max assist with bed mobility, mod assist x 2 for sit to stand with rw, total assist with toileting hygiene.  -LM                  General Information    Existing Precautions/Restrictions fall;LSO  -LM                  Cognition    Affect/Mental Status (Cognition) WFL   -LM        Orientation Status (Cognition) oriented x 3  -LM                  Positioning and Restraints    Post Treatment Position bed  -LM        In Bed call light within reach;encouraged to call for assist  -LM              User Key  (r) = Recorded By, (t) = Taken By, (c) = Cosigned By    Initials Name Effective Dates    LM Татьяна Bah OT 06/16/21 -                        OT Recommendation and Plan              Time Calculation:     Therapy Charges for Today     Code Description Service Date Service Provider Modifiers Qty    01350822428  OT SELF CARE/MGMT/TRAIN EA 15 MIN 10/4/2022 Татьяна Bah OT GO 2               Татьяна Bah OT  10/4/2022    Electronically signed by Татьяна Bah OT at 10/04/22 1128

## 2022-10-05 NOTE — PLAN OF CARE
Goal Outcome Evaluation:  Plan of Care Reviewed With: patient        Progress: no change  Outcome Evaluation: pt resting in bed, has had no complaints, has been straight cathed onece so far this shift, bladder scanner showed 572, got 600ml from pt, pt tolerated well, no acute changes, pt being moved from 48B to room 36,  will continue plan of care

## 2022-10-05 NOTE — PLAN OF CARE
Goal Outcome Evaluation:              Outcome Evaluation: Pt resting in bed at this time. Straight cath x1 this shift, 610ml urine returned. VSS. No complaints of pain or discomfort. Will continue POC.

## 2022-10-05 NOTE — DISCHARGE SUMMARY
[facility] HOSPITALISTS DISCHARGE SUMMARY    Patient Identification:  Name:  Tre Kinney  Age:  89 y.o.  Sex:  female  :  1932  MRN:  [id number]  Visit Number:  [id number]    Date of Admission: 2022  Date of Discharge: 10/5/2022, to [facility] bed    PCP: Jessica Pedro APRN    DISCHARGE DIAGNOSIS  -Acute blood loss anemia that was present on admission and due to a right hip traumatic hematoma in the setting of the supratherapeutic INR, status post 3 units packed red blood cells transfused this admission  -Supratherapeutic INR  that was present on admission, in a patient on chronic anticoagulation with Coumadin  for paroxysmal atrial fibrillation (goal INR 2.0-3.0), nail anticoagulation has been discontinued  -Acute superior endplate compression fracture of L1 with retropulsion (2022)  -Leukocytosis on admission, suspect due to mechanical fall that occurred prior to admission  -Acute kidney injury on top of chronic kidney disease stage IV (baseline creatinine 2.1-2.4 and admission creatinine 6.1)  -Anion gap metabolic acidosis present on admission, suspect due to a combination of lactic acidosis from the acute blood loss anemia and renal acidosis  -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 Vazquez and SANDRA Jaquez with palliative care  Dr. Sanchez with nephrology  Dr. Bell with general surgery  Dr. Ho and SANDRA Xiong with orthopedic surgery    PROCEDURES PERFORMED  2022: Transfusion of two units of PRBCs on each day  2022:  Transfusion of one unit of PRBCs on each day  9/19/2022-9/25/2022:  Left cephalic single lumen midline catheter    HOSPITAL COURSE  Patient is a 89 y.o. female presented to [facility] complaining of a mechanical fall with inability to get up.  In our emergency department, the patient was noted to have a hemoglobin level of 6.3.  Thus, she was admitted to the progressive care unit for further evaluation and treatment.  Please see the admitting history and physical for further details.  On initial exam, a large right hip hematoma was seen and was suspected to be the cause of the acute blood loss.  She was given Kcentra in the emergency department as well as vitamin K.  She ended up needing 3 units during the first 3 days of her hospitalization.  Afterwards, her hemoglobin was stable and she did not need any further treatment.  The hematoma seem to be resolving daily during her hospitalization.  Since the patient had a diverticular bleed in 2018 and has re-bled again now to the point of acute blood loss anemia, it has been determined that the risk of anticoagulation outweighs the benefit in this patient will be discharged without any anticoagulation.    Also, the patient had DIANA on top CKD stage IV on admission; nephrology was consulted.  With IV fluids (the initial ones containing bicarb) and blood transfusions, her renal function stabilized to normal and she will just need follow up with nephrology once she is out of this hospital.    Incidentally, the patient had a vertebral fracture that we believe occurred earlier this month.  Also, there was question of a right distal tib fracture and as such orthopedic surgery was consulted.  No acute fractures were present per their evaluation and the patient was instructed to ambulate and weight-bear as tolerated.  PT and OT saw the patient and she is severely weak; thus, she will be admitted to swing bed for strengthening.    The patient reported dysuria later in  the hospitalization; UA was abnormal but no bacteria grew on the culture.  Our team started Rocephin and this was continued even after the urine culture did not grow because her dysuria resolved.  Please note that the patient finished a course of Rocephin for an acute UTI while she was hospitalized.      During the end of her hospitalization, she had dyspnea.  She was on Lasix at home and chest x-ray here showed some mild CHF.  Thus, we gave the patient some doses of IV Bumex and then increase the home Lasix from 40 mg once a day to 40 mg twice a day.  We will periodically check her blood work to see how the creatinine and the electrolytes are trending.  Please note that we did obtain a Reds vest measurement and it was elevated prior to the doses of IV Bumex.  However, today's Reds vest measurement indicates that her lungs are now dried out.            VITAL SIGNS:  Temp:  [97.9 °F (36.6 °C)-98.4 °F (36.9 °C)] 98 °F (36.7 °C)  Heart Rate:  [76-95] 78  Resp:  [16-20] 18  BP: (132-170)/(53-75) 170/69  SpO2:  [96 %-97 %] 97 %  on   ;   Device (Oxygen Therapy): room air    Body mass index is 26.45 kg/m².  Wt Readings from Last 3 Encounters:   10/05/22 76.6 kg (168 lb 14.4 oz)   09/08/22 76.7 kg (169 lb)   05/02/22 76.7 kg (169 lb)       PHYSICAL EXAM:  Physical Exam  Vitals reviewed.   Constitutional:       General: She is awake. She is not in acute distress.     Appearance: She is well-developed. She is not ill-appearing.   HENT:      Head: Normocephalic and atraumatic.      Right Ear: External ear normal.      Left Ear: External ear normal.      Nose: Nose normal.   Eyes:      General: No scleral icterus.        Right eye: No discharge.         Left eye: No discharge.      Extraocular Movements: Extraocular movements intact.      Conjunctiva/sclera: Conjunctivae normal.      Pupils: Pupils are equal, round, and reactive to light.   Cardiovascular:      Rate and Rhythm: Normal rate and regular rhythm.      Pulses:  Normal pulses.      Heart sounds: No murmur heard.  Pulmonary:      Effort: Pulmonary effort is normal. No respiratory distress.      Breath sounds: No wheezing or rales.   Abdominal:      General: Bowel sounds are normal.      Palpations: Abdomen is soft.   Musculoskeletal:         General: No swelling, deformity or signs of injury.   Skin:     Capillary Refill: Capillary refill takes less than 2 seconds.      Coloration: Skin is not jaundiced or pale.      Comments: The large hematoma on her right hip appears to be resolving and there are no fresh components of bleeding seen.  I do not see any petechiae.   Neurological:      Mental Status: She is alert and oriented to person, place, and time. Mental status is at baseline.      Cranial Nerves: No cranial nerve deficit.   Psychiatric:         Mood and Affect: Mood normal.         Behavior: Behavior normal. Behavior is cooperative.          DISCHARGE DISPOSITION   Stable      Inter-facility transfer medications (From admission, onward)             sodium chloride 0.9 % flush 10 mL  (Insert Peripheral IV)  As Needed             sodium chloride 0.9 % flush 10 mL  (Insert and Maintain IV)  Every 12 Hours Scheduled             sodium chloride 0.9 % flush 10 mL  (Insert and Maintain IV)  As Needed             escitalopram (LEXAPRO) tablet 10 mg  Daily             gabapentin (NEURONTIN) capsule 300 mg  Nightly             HYDROcodone-acetaminophen (NORCO) 7.5-325 MG per tablet 1 tablet  Every 8 Hours PRN             phenol (CHLORASEPTIC) 1.4 % liquid 1 spray  Every 2 Hours PRN             promethazine (PHENERGAN) 6.25 mg in sodium chloride 0.9 % 50 mL  Every 6 Hours PRN        Question Answer Comment   [facility] PT requires failure of two alternative therapies prior to ordering promethazine IVPB. Has this patient failed two alternative therapies? Yes    Please choose prior alternative therapies that have failed: Other (See Comments)            sodium chloride  "0.9 % flush 10 mL  (Midline Care / Maintenance - 1 Lumen)  Every 12 Hours Scheduled             sodium chloride 0.9 % flush 10 mL  (Midline Care / Maintenance - 1 Lumen)  As Needed             docusate sodium (COLACE) capsule 100 mg  2 Times Daily             heparin (porcine) 5000 UNIT/ML injection 5,000 Units  Every 12 Hours Scheduled             metoprolol tartrate (LOPRESSOR) tablet 12.5 mg  Every 12 Hours Scheduled             pantoprazole (PROTONIX) EC tablet 40 mg  Every Early Morning             ipratropium-albuterol (DUO-NEB) nebulizer solution 3 mL  Every 6 Hours PRN             hydrALAZINE (APRESOLINE) tablet 50 mg  Every 8 Hours Scheduled             polyethylene glycol (MIRALAX) packet 17 g  2 Times Daily PRN             melatonin tablet 10 mg  Nightly             prochlorperazine (COMPAZINE) injection 5 mg  (prochlorperazine (COMPAZINE) IV or Oral or Rectal)  Every 6 Hours PRN        \"Or\" Linked Group Details        prochlorperazine (COMPAZINE) tablet 5 mg  (prochlorperazine (COMPAZINE) IV or Oral or Rectal)  Every 6 Hours PRN        \"Or\" Linked Group Details        prochlorperazine (COMPAZINE) suppository 25 mg  (prochlorperazine (COMPAZINE) IV or Oral or Rectal)  Every 12 Hours PRN        \"Or\" Linked Group Details        furosemide (LASIX) tablet 40 mg  2 Times Daily (Diuretics)             ondansetron (ZOFRAN) injection 4 mg  Every 6 Hours PRN                       Follow-up Information     Marianna Sanchez MD Follow up in 4 week(s).    Specialty: Nephrology  Contact information:  [address] [facility] [address] [phone]           Jessica Pedro, APRN .    Specialties: Nurse Practitioner, Family Medicine  Contact information:  [address] [facility] [address] [phone]             TEST  RESULTS PENDING AT DISCHARGE:  None       CODE STATUS  Code Status and Medical Interventions:   Ordered at: 10/04/22 1333     Medical Intervention Limits:    NO intubation (DNI)     Level Of Support Discussed " With:    Patient    Next of Kin (If No Surrogate)     Code Status (Patient has no pulse and is not breathing):    No CPR (Do Not Attempt to Resuscitate)     Medical Interventions (Patient has pulse or is breathing):    Limited Support     Comments:    Patient states that she does not want to be resuscitated or put on a ventilator, her son Leno supports her decision       Jannet Lagunas MD  10/05/22  10:55 EDT    Please note that this discharge summary required more than 30 minutes to complete.    Please send a copy of this dictation to the following providers:  Jessica Pedro APRN

## 2022-10-05 NOTE — CASE MANAGEMENT/SOCIAL WORK
Discharge Planning Assessment  [facility]     Patient Name: Tre Kinney  MRN: [id number]  Today's Date: 10/5/2022    Admit Date: 9/19/2022    Plan: Pt was discussed in [facility] today. SS discussed pt with PT and rehab is recommended. SS spoke to pt and pt's son, Leno on this date. Pt and pt's son request a swing bed consult. SS notified Dr. Lagunas. SS to follow.     Discharge Plan     Row Name 10/05/22 1127       Plan    Final Discharge Disposition Code 61 - hospital-based swing bed    Final Note Pt has a swing bed consult. SS discussed pt with swing bed RN and pt has been approved for 10 days of swing bed admission. SS notified Dr. Lagunas and pt's son, Leno. Pt is being discharged from acute care and admitted to swing bed. SS will continue to follow for discharge planning as needed.              Continued Care and Services - Admitted Since 9/19/2022     Destination Coordination complete.    Service Provider Request Status Selected Services Address Phone Fax Patient Preferred    [facility] SWING BED   Selected Skilled Nursing [address] [facility] [phone] -- --    [facility]  Declined  Bed not available N/A [address] [id number] [phone] --    [facility]  Declined  Bed not available N/A [address] [id number] [phone] --    [facility]  Declined  Insurance Denial N/A [address] [id number] [phone] --              Expected Discharge Date and Time     Expected Discharge Date Expected Discharge Time    Oct 5, 2022         LENORE Koch

## 2022-10-05 NOTE — PAYOR COMM NOTE
"[facility]  NPI: [id number]    Utilization Review   Contact:Jessica Trent MSN, APRN, FNP- BC  Phone: [phone]  Fax: [phone]    Discharge Notification  REF# [id number]  Dc to swing bed services          Tre Kinney (89 y.o. Female)             Date of Birth   12/09/1932    Social Security Number       Address   [address] [address]    Home Phone   [phone]    MRN   [id number]       Yazidi   Tenriism    Marital Status                               Admission Date   9/19/22    Admission Type   Emergency    Admitting Provider   Jannet Lagunas MD    Attending Provider       Department, Room/Bed   [address], 3348/2S       Discharge Date   10/5/2022    Discharge Disposition   Swing Bed    Discharge Destination                               Attending Provider: (none)   Allergies: Adhesive Tape, Xanax [Alprazolam]    Isolation: None   Infection: None   Code Status: No CPR   Advance Care Planning Activity    Ht: 170.2 cm (67\")   Wt: 76.6 kg (168 lb 14.4 oz)    Admission Cmt: None   Principal Problem: Anemia, unspecified type [D64.9]                 Active Insurance as of 9/19/2022     Primary Coverage     Payor Plan Insurance Group Employer/Plan Group    ANTHEM MEDICARE REPLACEMENT ANTHEM MEDICARE ADVANTAGE [id number]     Payor Plan Address Payor Plan Phone Number Payor Plan Fax Number Effective Dates    [address] [phone]  1/1/2022 - None Entered    [facility] [id number]       Subscriber Name Subscriber Birth Date Member ID       TRE KINNEY 12/9/1932 [id number]                 Emergency Contacts      (Rel.) Home Phone Work Phone Mobile Phone    Valdez Kinney (Son) [phone] -- --    Leno Kinney (Son) [phone] -- [phone]            Jannet Lagunas MD    Physician   Medicine   Discharge Summary      Incomplete   Date of Service:  10/05/22 1055   Creation Time:  10/05/22 1055              Incomplete   "              Show:Clear all  [x]Manual[x]Template[]Copied    Added by:  [x]Jannet Lagunas MD      []Haroldo for details         [facility] HOSPITALISTS DISCHARGE SUMMARY     Patient Identification:  Name:  Tre Kinney  Age:  89 y.o.  Sex:  female  :  1932  MRN:  [id number]  Visit Number:  [id number]     Date of Admission: 2022  Date of Discharge: 10/5/2022, to [facility] swing bed     PCP: Jessica Pedro APRN     DISCHARGE DIAGNOSIS        CONSULTS         PROCEDURES PERFORMED        HOSPITAL COURSE  Patient is a 89 y.o. female presented to [facility] complaining of .  Please see the admitting history and physical for further details.       Janeth   Admitted from home with acute blood loss anemia in the setting of supratherapeutic INR (history PAF and unknown history of prior DVT).  Patient has received 3 units PRBCs and has a large right thigh hematoma which has stabilized over the past 2 to 3 days.  Patient also with DIANA on CKD and overall her renal function has improved and nephrology has signed off.  Patient with a vertebral fracture that I believe occurred earlier this month and there was question of a right distal tib fracture and as such orthopedic surgery was consulted who felt no acute fractures were present and patient could ambulate and weight-bear as tolerated.  Patient has been working with PT and OT and is severely weak and will likely benefit from SNF versus IPR at discharge; patient and son agreeable.  After discussion with patient and her son Phillip, it is felt that resuming Coumadin at this time outweighs the benefits and will so we'll continue to hold.  Patient with history of diverticular bleed in 2018 while on Coumadin as well.  I have resumed some of her antihypertensive medications today as blood pressure is uncontrolled and overall patient relatively stable from medical standpoint.     Gertrude 10/2  Hematoma stable with stable H&H. She  reported dysuria later in the week with abnormal UA but no growth on culture. I've continued Rocephin empirically as her dysuria has resolved after starting Rocephin. Her renal function has improved and appears near baseline. Her biggest complaint later in the week has been dyspnea with some volume overload. I first restarted her home Lasix and ordered some IV Bumex this weekend. SS still working on placement.   Edited by: Mustapha Loredo DO at 10/2/2022 [id number]     VITAL SIGNS:  Temp:  [97.9 °F (36.6 °C)-98.4 °F (36.9 °C)] 98 °F (36.7 °C)  Heart Rate:  [76-95] 78  Resp:  [16-20] 18  BP: (132-170)/(53-75) 170/69  SpO2:  [96 %-97 %] 97 %  on   ;   Device (Oxygen Therapy): room air     Body mass index is 26.45 kg/m².      Wt Readings from Last 3 Encounters:   10/05/22 76.6 kg (168 lb 14.4 oz)   09/08/22 76.7 kg (169 lb)   05/02/22 76.7 kg (169 lb)         PHYSICAL EXAM:  Physical Exam      DISCHARGE DISPOSITION   Stable         Inter-facility transfer medications (From admission, onward)                        sodium chloride 0.9 % flush 10 mL  (Insert Peripheral IV)  As Needed                sodium chloride 0.9 % flush 10 mL  (Insert and Maintain IV)  Every 12 Hours Scheduled                sodium chloride 0.9 % flush 10 mL  (Insert and Maintain IV)  As Needed                escitalopram (LEXAPRO) tablet 10 mg  Daily                gabapentin (NEURONTIN) capsule 300 mg  Nightly                HYDROcodone-acetaminophen (NORCO) 7.5-325 MG per tablet 1 tablet  Every 8 Hours PRN                phenol (CHLORASEPTIC) 1.4 % liquid 1 spray  Every 2 Hours PRN                promethazine (PHENERGAN) 6.25 mg in sodium chloride 0.9 % 50 mL  Every 6 Hours PRN        Question Answer Comment   [facility] PT requires failure of two alternative therapies prior to ordering promethazine IVPB. Has this patient failed two alternative therapies? Yes     Please choose prior alternative therapies that have failed: Other (See  "Comments)                sodium chloride 0.9 % flush 10 mL  (Midline Care / Maintenance - 1 Lumen)  Every 12 Hours Scheduled                sodium chloride 0.9 % flush 10 mL  (Midline Care / Maintenance - 1 Lumen)  As Needed                docusate sodium (COLACE) capsule 100 mg  2 Times Daily                heparin (porcine) 5000 UNIT/ML injection 5,000 Units  Every 12 Hours Scheduled                metoprolol tartrate (LOPRESSOR) tablet 12.5 mg  Every 12 Hours Scheduled                pantoprazole (PROTONIX) EC tablet 40 mg  Every Early Morning                ipratropium-albuterol (DUO-NEB) nebulizer solution 3 mL  Every 6 Hours PRN                hydrALAZINE (APRESOLINE) tablet 50 mg  Every 8 Hours Scheduled                polyethylene glycol (MIRALAX) packet 17 g  2 Times Daily PRN                melatonin tablet 10 mg  Nightly                prochlorperazine (COMPAZINE) injection 5 mg  (prochlorperazine (COMPAZINE) IV or Oral or Rectal)  Every 6 Hours PRN        \"Or\" Linked Group Details           prochlorperazine (COMPAZINE) tablet 5 mg  (prochlorperazine (COMPAZINE) IV or Oral or Rectal)  Every 6 Hours PRN        \"Or\" Linked Group Details           prochlorperazine (COMPAZINE) suppository 25 mg  (prochlorperazine (COMPAZINE) IV or Oral or Rectal)  Every 12 Hours PRN        \"Or\" Linked Group Details           furosemide (LASIX) tablet 40 mg  2 Times Daily (Diuretics)                ondansetron (ZOFRAN) injection 4 mg  Every 6 Hours PRN                                 Follow-up Information      Marianna Sanchez MD Follow up in 4 week(s).    Specialty: Nephrology  Contact information:  [address] [facility] [address] [phone]              Jessica Pedro, LANCE .    Specialties: Nurse Practitioner, Family Medicine  Contact information:  [address] [facility] [address] [phone]               TEST  RESULTS PENDING AT DISCHARGE:  None     CODE STATUS      Code Status and Medical Interventions:   Ordered at: " 10/04/22 1333     Medical Intervention Limits:     NO intubation (DNI)     Level Of Support Discussed With:     Patient     Next of Kin (If No Surrogate)     Code Status (Patient has no pulse and is not breathing):     No CPR (Do Not Attempt to Resuscitate)     Medical Interventions (Patient has pulse or is breathing):     Limited Support     Comments:     Patient states that she does not want to be resuscitated or put on a ventilator, her son Leno supports her decision         Jannet Lagunas MD  10/05/22  10:55 EDT     Please note that this discharge summary required more than 30 minutes to complete.     Please send a copy of this dictation to the following providers:  Jessica Pedro APRN

## 2022-10-05 NOTE — CASE MANAGEMENT/SOCIAL WORK
Discharge Planning Assessment  [facility]     Patient Name: Tre Kinney  MRN: [id number]  Today's Date: 10/5/2022    Admit Date: 10/5/2022    Plan: Pt was approved for 10 days of swing bed admission today. Pt was admitted to swing bed today. Pt lives with her son, Valdez and she plans to return home at discharge. Pt did not utilize home health services prior to admission. Pt has O2 PRN, wheelchair, rolling walker and bedside commode via [facility]. PCP is Jessica Pedro. Pt did not have a POA or living will. SS to follow and assist as needed with discharge planning.   Discharge Needs Assessment     Row Name 10/05/22 1618       Living Environment    People in Home child(abner), adult    Name(s) of People in Home Valdez Kinney-padmaja    Current Living Arrangements home    Primary Care Provided by self;child(abner)    Provides Primary Care For no one    Family Caregiver if Needed child(abner), adult    Family Caregiver Names Valdez Kinney and Leno Kinney-adam    Quality of Family Relationships helpful;involved;stressful    Able to Return to Prior Arrangements yes       Transition Planning    Patient/Family Anticipates Transition to home with family    Transportation Anticipated family or friend will provide       Discharge Needs Assessment    Equipment Currently Used at Home wheelchair;commode;walker, rolling;oxygen  O2 PRN and other DME via [facility]    Discharge Facility/Level of Care Needs --  Pt did not utilize home health services prior to admission.               Discharge Plan     Row Name 10/05/22 2996       Plan    Plan Pt was approved for 10 days of swing bed admission today. Pt was admitted to swing bed today. Pt lives with her son, Valdez and she plans to return home at discharge. Pt did not utilize home health services prior to admission. Pt has O2 PRN, wheelchair, rolling walker and bedside commode via [facility]. PCP is Jessica Pedro. Pt did not have a POA or living will. SS to follow  and assist as needed with discharge planning.    Patient/Family in Agreement with Plan yes                 Selected Continued Care - Prior Encounters Includes selections from prior encounters from 7/7/2022 to 10/5/2022    Discharged on 10/5/2022 Admission date: 9/19/2022 - Discharge disposition: Swing Bed    Destination     Service Provider Selected Services Address Phone Fax Patient Preferred    [facility]  Skilled Nursing [address] [place] [id number] [phone] -- --                       Demographic Summary     Row Name 10/05/22 7148       General Information    Referral Source physician    Reason for Consult --  SS received consult for may need help with final discharge disposition. SS spoke to pt's son on this date. Pt was admitted to swing bed.              LENORE Koch

## 2022-10-06 PROCEDURE — 99221 1ST HOSP IP/OBS SF/LOW 40: CPT | Performed by: PSYCHIATRY & NEUROLOGY

## 2022-10-06 PROCEDURE — 97110 THERAPEUTIC EXERCISES: CPT

## 2022-10-06 PROCEDURE — 97530 THERAPEUTIC ACTIVITIES: CPT

## 2022-10-06 PROCEDURE — 94799 UNLISTED PULMONARY SVC/PX: CPT

## 2022-10-06 PROCEDURE — 94664 DEMO&/EVAL PT USE INHALER: CPT

## 2022-10-06 PROCEDURE — 97116 GAIT TRAINING THERAPY: CPT

## 2022-10-06 PROCEDURE — 97162 PT EVAL MOD COMPLEX 30 MIN: CPT

## 2022-10-06 PROCEDURE — 94761 N-INVAS EAR/PLS OXIMETRY MLT: CPT

## 2022-10-06 PROCEDURE — 25010000002 HEPARIN (PORCINE) PER 1000 UNITS: Performed by: INTERNAL MEDICINE

## 2022-10-06 PROCEDURE — 97166 OT EVAL MOD COMPLEX 45 MIN: CPT

## 2022-10-06 RX ADMIN — METOPROLOL TARTRATE 12.5 MG: 25 TABLET, FILM COATED ORAL at 21:57

## 2022-10-06 RX ADMIN — HYDRALAZINE HYDROCHLORIDE 50 MG: 50 TABLET, FILM COATED ORAL at 13:50

## 2022-10-06 RX ADMIN — FUROSEMIDE 40 MG: 40 TABLET ORAL at 16:22

## 2022-10-06 RX ADMIN — DOCUSATE SODIUM 100 MG: 100 CAPSULE ORAL at 08:03

## 2022-10-06 RX ADMIN — METOPROLOL TARTRATE 12.5 MG: 25 TABLET, FILM COATED ORAL at 08:03

## 2022-10-06 RX ADMIN — FUROSEMIDE 40 MG: 40 TABLET ORAL at 08:03

## 2022-10-06 RX ADMIN — HYDRALAZINE HYDROCHLORIDE 50 MG: 50 TABLET, FILM COATED ORAL at 06:09

## 2022-10-06 RX ADMIN — Medication 10 MG: at 21:57

## 2022-10-06 RX ADMIN — Medication 10 ML: at 21:58

## 2022-10-06 RX ADMIN — ESCITALOPRAM 10 MG: 10 TABLET, FILM COATED ORAL at 08:03

## 2022-10-06 RX ADMIN — HEPARIN SODIUM 5000 UNITS: 5000 INJECTION INTRAVENOUS; SUBCUTANEOUS at 21:57

## 2022-10-06 RX ADMIN — HEPARIN SODIUM 5000 UNITS: 5000 INJECTION INTRAVENOUS; SUBCUTANEOUS at 08:03

## 2022-10-06 RX ADMIN — PANTOPRAZOLE SODIUM 40 MG: 40 TABLET, DELAYED RELEASE ORAL at 06:09

## 2022-10-06 RX ADMIN — DOCUSATE SODIUM 100 MG: 100 CAPSULE ORAL at 21:57

## 2022-10-06 RX ADMIN — IPRATROPIUM BROMIDE AND ALBUTEROL SULFATE 3 ML: .5; 2.5 SOLUTION RESPIRATORY (INHALATION) at 08:14

## 2022-10-06 RX ADMIN — GABAPENTIN 300 MG: 300 CAPSULE ORAL at 21:57

## 2022-10-06 RX ADMIN — HYDRALAZINE HYDROCHLORIDE 50 MG: 50 TABLET, FILM COATED ORAL at 21:57

## 2022-10-06 RX ADMIN — Medication 10 ML: at 21:57

## 2022-10-06 RX ADMIN — IPRATROPIUM BROMIDE AND ALBUTEROL SULFATE 3 ML: .5; 2.5 SOLUTION RESPIRATORY (INHALATION) at 18:41

## 2022-10-06 NOTE — PROGRESS NOTES
Nutrition Services    Patient Name:  Tre Kinney  YOB: 1932  MRN: [id number]  Admit Date:  10/5/2022    BMI: 24.54  Diet: Soft Texture, Chopped, Thin  Boost TID was changed to Magic Cup BID due to not liking Boost  Intake: poor intake  Fluid: poor intake  Pt reported it is hard to feed herself due to her hands shaking.      Electronically signed by:  Jose Oneal RD  10/06/22 15:18 EDT

## 2022-10-06 NOTE — NURSING NOTE
Noted that pt had no urine output since straight cath at 1600. Bladder scan showed 500mL. Asked pt if she felt like her bladder was full and she said no. After several attempts, pt was able to urinate after encouraging to bear down

## 2022-10-06 NOTE — THERAPY EVALUATION
Patient Name: Tre Kinney  : 1932    MRN: [id number]                              Today's Date: 10/6/2022       Admit Date: 10/5/2022    Visit Dx: No diagnosis found.  Patient Active Problem List   Diagnosis   • Acid reflux   • Ankle arthralgia   • Cardiac conduction disorder   • Closed fracture of distal fibula   • Arteriosclerosis of coronary artery   • Fracture of distal end of tibia   • Elevated cholesterol   • BP (high blood pressure)   • Hypertrophic polyarthritis   • OP (osteoporosis)   • Right heart failure (HCC)   • Heart failure, chronic, right-sided (HCC)   • SOB (shortness of breath)   • Palpitations   • CHF (congestive heart failure) (HCC)   • Congestive heart failure (HCC)   • Precordial pain   • Chronic anticoagulation   • Hypokalemia   • Anemia, unspecified type   • Generalized weakness     Past Medical History:   Diagnosis Date   • Anemia    • Arthritis    • CHF (congestive heart failure) (HCC)    • Chronic kidney disease    • Coronary artery disease    • H/O blood clots    • History of transfusion     no reaction    • Hypertension      Past Surgical History:   Procedure Laterality Date   • ABDOMINAL SURGERY     • APPENDECTOMY     • CARDIAC SURGERY     •  SECTION     • COLONOSCOPY N/A 3/7/2018    Procedure: COLONOSCOPY;  Surgeon: Willie Gresham MD;  Location:  [facility];  Service:    • CORONARY ANGIOPLASTY WITH STENT PLACEMENT     • ENDOSCOPY N/A 3/6/2018    Procedure: ESOPHAGOGASTRODUODENOSCOPY;  Surgeon: Willie Gresham MD;  Location:  [facility];  Service:    • PACEMAKER IMPLANTATION      left side    • SKIN BIOPSY     • TUBAL ABDOMINAL LIGATION        General Information     Row Name 10/06/22 1444          OT Time and Intention    Document Type evaluation  -LA     Mode of Treatment occupational therapy  -LA     Row Name 10/06/22 1444          General Information    Patient Profile Reviewed yes  -LA     Prior Level of Function independent:;ADL's  Patient reported she  was independent with ADLs prior to fall  -LA     Existing Precautions/Restrictions fall;other (see comments)  LSO backbrace  -LA     Barriers to Rehab cognitive status  -LA     Row Name 10/06/22 1444          Occupational Profile    Reason for Services/Referral (Occupational Profile) OT to assess for changes in level of independence/safety with ADLs.  -LA     Patient Goals (Occupational Profile) Patient would like to get stronger and return home  -LA     Row Name 10/06/22 1444          Living Environment    People in Home child(abner), adult  -LA     Row Name 10/06/22 1444          Cognition    Orientation Status (Cognition) oriented x 3  -LA     Row Name 10/06/22 1444          Safety Issues, Functional Mobility    Impairments Affecting Function (Mobility) balance;strength;endurance/activity tolerance  -LA           User Key  (r) = Recorded By, (t) = Taken By, (c) = Cosigned By    Initials Name Provider Type    Dianna Barreto OT Occupational Therapist                 Mobility/ADL's     Row Name 10/06/22 1446          Bed Mobility    All Activities, [place] (Bed Mobility) maximum assist (25% patient effort);2 person assist  -LA     Rolling Left [place] (Bed Mobility) moderate assist (50% patient effort);2 person assist  -LA     Rolling Right [place] (Bed Mobility) moderate assist (50% patient effort);2 person assist  -LA     Scooting/Bridging [place] (Bed Mobility) moderate assist (50% patient effort)  -LA     Supine-Sit [place] (Bed Mobility) moderate assist (50% patient effort);2 person assist;nonverbal cues (demo/gesture);verbal cues  -LA     Sit-Supine [place] (Bed Mobility) moderate assist (50% patient effort);2 person assist;nonverbal cues (demo/gesture);verbal cues  -LA     Bed Mobility, Safety Issues decreased use of arms for pushing/pulling;decreased use of legs for bridging/pushing;impaired trunk control for bed mobility  -LA     Assistive Device (Bed Mobility) bed rails;head  of bed elevated  -[facility] Name 10/06/22 1446          Transfers    Transfers bed-chair transfer  -LA     Bed-Chair [place] (Transfers) maximum assist (25% patient effort);2 person assist;nonverbal cues (demo/gesture);verbal cues  -LA     Assistive Device (Bed-Chair Transfers) walker, front-wheeled  -LA     Sit-Stand [place] (Transfers) moderate assist (50% patient effort);2 person assist;nonverbal cues (demo/gesture);verbal cues  -LA     Row Name 10/06/22 1446          Activities of Daily Living    BADL Assessment/Intervention bathing;upper body dressing;lower body dressing;grooming;feeding;toileting  -[facility] Name 10/06/22 1446          Bathing Assessment/Intervention    [place] Level (Bathing) maximum assist (25% patient effort)  -LA     Row Name 10/06/22 1446          Upper Body Dressing Assessment/Training    [place] Level (Upper Body Dressing) maximum assist (25% patient effort)  -LA     Row Name 10/06/22 1446          Lower Body Dressing Assessment/Training    [place] Level (Lower Body Dressing) dependent (less than 25% patient effort)  -LA     Row Name 10/06/22 1446          Grooming Assessment/Training    [place] Level (Grooming) moderate assist (50% patient effort)  -LA     Row Name 10/06/22 1446          Self-Feeding Assessment/Training    [place] Level (Feeding) set up  -LA     Row Name 10/06/22 1446          Toileting Assessment/Training    [place] Level (Toileting) dependent (less than 25% patient effort)  -LA           User Key  (r) = Recorded By, (t) = Taken By, (c) = Cosigned By    Initials Name Provider Type    Dianna Barreto OT Occupational Therapist               Obj/Interventions     Row Name 10/06/22 1449          Sensory Assessment (Somatosensory)    Sensory Assessment (Somatosensory) sensation intact  -[facility] Name 10/06/22 1449          Range of Motion Comprehensive    General Range of Motion no range of motion deficits identified  -LA      Comment, General Range of Motion BUE ROM WFL  -LA     Row Name 10/06/22 1449          Strength Comprehensive (MMT)    Comment, General Manual Muscle Testing (MMT) Assessment BUE MMT grossly 3+/5  -LA     Row Name 10/06/22 1449          Motor Skills    Motor Skills coordination;functional endurance  -LA     Coordination minimal impairment;gross motor deficit;fine motor deficit  -LA     Functional Endurance poor+  -LA     Row Name 10/06/22 1449          Balance    Balance Assessment sitting static balance;sitting dynamic balance  -LA     Static Sitting Balance supervision  -LA     Dynamic Sitting Balance minimal assist;moderate assist  -LA     Balance Interventions minimal challenge;dynamic reaching  -LA           User Key  (r) = Recorded By, (t) = Taken By, (c) = Cosigned By    Initials Name Provider Type    Dianna Barreto OT Occupational Therapist               Goals/Plan     Row Name 10/06/22 1454          Bed Mobility Goal 1 (OT)    Activity/Assistive Device (Bed Mobility Goal 1, OT) bed mobility activities, all  -LA     Glenn Level/Cues Needed (Bed Mobility Goal 1, OT) modified independence  -LA     Time Frame (Bed Mobility Goal 1, OT) by discharge  -LA     Row Name 10/06/22 1454          Transfer Goal 1 (OT)    Activity/Assistive Device (Transfer Goal 1, OT) bed-to-chair/chair-to-bed;commode, 3-in-1  -LA     Glenn Level/Cues Needed (Transfer Goal 1, OT) contact guard required  -LA     Time Frame (Transfer Goal 1, OT) by discharge  -LA     Row Name 10/06/22 1454          Dressing Goal 1 (OT)    Activity/Device (Dressing Goal 1, OT) dressing skills, all  -LA     Glenn/Cues Needed (Dressing Goal 1, OT) minimum assist (75% or more patient effort);moderate assist (50-74% patient effort)  -LA     Row Name 10/06/22 1454          Strength Goal 1 (OT)    Strength Goal 1 (OT) Increase BUE strength to 4/5 to promote increased safety with transfers and independence with ADLs.  -LA     Time Frame  (Strength Goal 1, OT) by discharge  -LA     Row Name 10/06/22 1459          Therapy Assessment/Plan (OT)    Planned Therapy Interventions (OT) activity tolerance training;patient/caregiver education/training;adaptive equipment training;ROM/therapeutic exercise;occupation/activity based interventions;BADL retraining;strengthening exercise;transfer/mobility retraining;functional balance retraining  -LA           User Key  (r) = Recorded By, (t) = Taken By, (c) = Cosigned By    Initials Name Provider Type    Dianna Barreto, VELASQUEZ Occupational Therapist               Clinical Impression     Row Name 10/06/22 1451          Plan of Care Review    Plan of Care Reviewed With patient  -LA     Outcome Evaluation OT evaluation completed this date. Patient engaged in UE tasks with focus on trunk control and fx reaching to promote increased independence with ADLs. Patient has had a significant decline in independence and safety with ADLs resulting in need for skilled OT services. OT to address deficits to allow patient to max level of independence prior to d/c. Patient admitted for swing bed and OT discussed activities. Patient given word search puzzles to promote increase activity. patient also reports she watches television. Family also stated they could bring in books for entertainment as needed.  -LA     Row Name 10/06/22 1459          Therapy Assessment/Plan (OT)    Patient/Family Therapy Goal Statement (OT) Get stronger so I can transfer and get home  -LA     Rehab Potential (OT) good, to achieve stated therapy goals  -LA     Criteria for Skilled Therapeutic Interventions Met (OT) yes;meets criteria;skilled treatment is necessary  -LA     Therapy Frequency (OT) 6 times/wk  -LA     Row Name 10/06/22 1459          Therapy Plan Review/Discharge Plan (OT)    Anticipated Discharge Disposition (OT) home with assist;home with home health  -LA     Row Name 10/06/22 1451          Positioning and Restraints    Pre-Treatment Position  in bed  -LA     Post Treatment Position bed  -LA     In Bed side lying right;call light within reach;encouraged to call for assist;exit alarm on;with family/caregiver  -LA           User Key  (r) = Recorded By, (t) = Taken By, (c) = Cosigned By    Initials Name Provider Type    Dianna Barreto OT Occupational Therapist               Outcome Measures     Row Name 10/06/22 1455          How much help from another is currently needed...    Putting on and taking off regular lower body clothing? 1  -LA     Bathing (including washing, rinsing, and drying) 1  -LA     Toileting (which includes using toilet bed pan or urinal) 1  -LA     Putting on and taking off regular upper body clothing 2  -LA     Taking care of personal grooming (such as brushing teeth) 3  -LA     Eating meals 3  -LA     AM-PAC 6 Clicks Score (OT) 11  -LA     Row Name 10/06/22 1455          Functional Assessment    Outcome Measure Options AM-PAC 6 Clicks Daily Activity (OT)  -LA           User Key  (r) = Recorded By, (t) = Taken By, (c) = Cosigned By    Initials Name Provider Type    Dianna Barreto OT Occupational Therapist                  OT Recommendation and Plan  Planned Therapy Interventions (OT): activity tolerance training, patient/caregiver education/training, adaptive equipment training, ROM/therapeutic exercise, occupation/activity based interventions, BADL retraining, strengthening exercise, transfer/mobility retraining, functional balance retraining  Therapy Frequency (OT): 6 times/wk  Plan of Care Review  Plan of Care Reviewed With: patient  Outcome Evaluation: OT evaluation completed this date. Patient engaged in UE tasks with focus on trunk control and fx reaching to promote increased independence with ADLs. Patient has had a significant decline in independence and safety with ADLs resulting in need for skilled OT services. OT to address deficits to allow patient to max level of independence prior to d/c. Patient admitted for swing  bed and OT discussed activities. Patient given word search puzzles to promote increase activity. patient also reports she watches television. Family also stated they could bring in books for entertainment as needed.     Time Calculation:    Time Calculation- OT     Row Name 10/06/22 1456             Time Calculation- OT    Total Timed Code Minutes- OT 30 minute(s)  -LA            User Key  (r) = Recorded By, (t) = Taken By, (c) = Cosigned By    Initials Name Provider Type    Dianna Barreto OT Occupational Therapist              Therapy Charges for Today     Code Description Service Date Service Provider Modifiers Qty    15751453951  OT THER PROC EA 15 MIN 10/6/2022 Dianna Siddiqi OT GO 1    [id number]  OT THERAPEUTIC ACT EA 15 MIN 10/6/2022 Dianna Siddiqi OT GO 1    [id number]  OT EVAL MOD COMPLEXITY 4 10/6/2022 Dianna Siddiqi OT GO 1               Dianna Siddiqi OT  10/6/2022

## 2022-10-06 NOTE — PROGRESS NOTES
[facility] CROSS COVER NOTE    Patient Identification:  Name:  Tre Kinney  Age:  89 y.o.  Sex:  female  :  1932  MRN:  [id number]  Visit number:  [id number]  Primary Care Provider:  Jessica Pedro APRN    Length of stay in inpatient status:  1    Brief Update     Discussed with bedside nurse Destinee.  The patient is doing well with no chest pain, no shortness of air, no nausea, no hematochezia, no black stools, no hematemesis.  Vital signs show elevated blood pressures but due to patient age will allow permissive hypertension.  Today, she walked 3 feet during transfer to a chair.  Will repeat the labs in the am.     Jannet Lagunas MD  [facility]  10/06/22  15:46 EDT

## 2022-10-06 NOTE — CONSULTS
Referring Provider: Janneth  Reason for Consultation: Depression      Chief complaint/Focus of Exam: Depression    Subjective .     History of present illness: Patient is an 89-year-old female with a history significant for CHF, CKD, CAD, history of clots, chronic anticoagulation, sick sinus syndrome with pacer placement, chronic diastolic CHF, anemia, and depression who originally admitted to the hospital after a fall.  Psychiatry was consulted after patient has shown significant depressive symptoms and has not been overly cooperative or ambitious with physical therapy or treatment plans.  I evaluated patient at bedside today.  She reports a long history of depression that she has had managed by her primary care provider.  She reports that she has been on Lexapro for several years and it initially worked well.  She reports significant depression and dysphoria since her fall and is anxious about her health in general.  She denies SI/HI/AVH.  She reports that she has never seen a psychiatrist or therapist in the past and she has never been inpatient for mental health reasons.    Review of Systems  Pertinent items are noted in HPI    History  Past Medical History:   Diagnosis Date   • Anemia    • Arthritis    • CHF (congestive heart failure) (HCC)    • Chronic kidney disease    • Coronary artery disease    • H/O blood clots    • History of transfusion     no reaction    • Hypertension    ,   Past Surgical History:   Procedure Laterality Date   • ABDOMINAL SURGERY     • APPENDECTOMY     • CARDIAC SURGERY     •  SECTION     • COLONOSCOPY N/A 3/7/2018    Procedure: COLONOSCOPY;  Surgeon: Willie Gresham MD;  Location:  [facility];  Service:    • CORONARY ANGIOPLASTY WITH STENT PLACEMENT     • ENDOSCOPY N/A 3/6/2018    Procedure: ESOPHAGOGASTRODUODENOSCOPY;  Surgeon: Willie Gresham MD;  Location:  [facility];  Service:    • PACEMAKER IMPLANTATION      left side    • SKIN BIOPSY     • TUBAL ABDOMINAL  LIGATION     ,   Family History   Problem Relation Age of Onset   • Other Mother         cardiac disorder   • Hypertension Mother    • Heart disease Mother    • Thyroid disease Mother    • Other Father         cardiac disorder   • Heart disease Father    • Hypertension Father    • Other Sister         cardiac disorder   • Other Brother         cardiac disorder   • Other Other         cardiac disorder   • Other Other         cardiac disorder   ,   Social History     Socioeconomic History   • Marital status:    Tobacco Use   • Smoking status: Never Smoker   Substance and Sexual Activity   • Alcohol use: No   • Drug use: No   • Sexual activity: Defer     E-cigarette/Vaping     E-cigarette/Vaping Substances     E-cigarette/Vaping Devices       ,   Medications Prior to Admission   Medication Sig Dispense Refill Last Dose   • ALPRAZolam (XANAX) 1 MG tablet Take 1 mg by mouth 2 (Two) Times a Day As Needed for Anxiety.   Unknown at Unknown time   • aspirin 81 MG EC tablet Take 81 mg by mouth Daily.   Unknown at Unknown time   • escitalopram (LEXAPRO) 10 MG tablet Take 10 mg by mouth Daily.   Unknown at Unknown time   • furosemide (LASIX) 40 MG tablet Take 40 mg by mouth Daily.   Unknown at Unknown time   • gabapentin (NEURONTIN) 400 MG capsule Take 400 mg by mouth Every Night.   Unknown at Unknown time   • hydrALAZINE (APRESOLINE) 100 MG tablet Take 1 tablet by mouth Every 8 (Eight) Hours. 90 tablet 0 Unknown at Unknown time   • HYDROcodone-acetaminophen (NORCO) 7.5-325 MG per tablet Take 1 tablet by mouth Every 8 (Eight) Hours As Needed for Moderate Pain.   Unknown at Unknown time   • isosorbide mononitrate (IMDUR) 60 MG 24 hr tablet Take 60 mg by mouth Daily.   Unknown at Unknown time   • metoprolol tartrate (LOPRESSOR) 50 MG tablet Take 50 mg by mouth 2 (Two) Times a Day.   Unknown at Unknown time   • pantoprazole (PROTONIX) 40 MG EC tablet Take 40 mg by mouth Daily.   Unknown at Unknown time   • potassium  chloride (MICRO-K) 10 MEQ CR capsule Take 10 mEq by mouth 2 (Two) Times a Day.   Unknown at Unknown time   • warfarin (COUMADIN) 6 MG tablet Take 6 mg by mouth Daily.   Unknown at Unknown time   , Scheduled Meds:  docusate sodium, 100 mg, Oral, BID  escitalopram, 10 mg, Oral, Daily  furosemide, 40 mg, Oral, BID  gabapentin, 300 mg, Oral, Nightly  heparin (porcine), 5,000 Units, Subcutaneous, Q12H  hydrALAZINE, 50 mg, Oral, Q8H  melatonin, 10 mg, Oral, Nightly  metoprolol tartrate, 12.5 mg, Oral, Q12H  pantoprazole, 40 mg, Oral, Q AM  sodium chloride, 10 mL, Intravenous, Q12H  sodium chloride, 10 mL, Intravenous, Q12H  [START ON 10/19/2022] tuberculin, 5 Units, Intradermal, Once    , Continuous Infusions:   , PRN Meds:  HYDROcodone-acetaminophen  •  ipratropium-albuterol  •  ondansetron  •  phenol  •  polyethylene glycol  •  prochlorperazine **OR** prochlorperazine **OR** prochlorperazine  •  promethazine  •  sodium chloride  •  sodium chloride  •  sodium chloride and Allergies:  Adhesive tape and Xanax [alprazolam]    Objective     Vital Signs   Temp:  [97.2 °F (36.2 °C)-98.6 °F (37 °C)] 98.3 °F (36.8 °C)  Heart Rate:  [74-93] 91  Resp:  [16-19] 19  BP: (129-165)/(71-75) 163/74    Mental Status Exam:   Mental Status Exam:    Hygiene:   good  Cooperation:  Cooperative  Eye Contact:  Good  Psychomotor Behavior:  Slow  Affect:  Restricted  Hopelessness: 7  Speech:  Normal and low volume  Thought Progress:  Goal directed and Linear  Thought Content:  Normal and Mood congruent  Suicidal:  None  Homicidal:  None  Hallucinations:  None  Delusion:  None  Memory:  Intact  Orientation:  Person, Place, Time and Situation  Reliability:  good  Insight:  Fair  Judgement:  Fair  Impulse Control:  Fair    Results Review:   I reviewed the patient's new clinical results.  Lab Results (last 24 hours)     ** No results found for the last 24 hours. **        Imaging Results (Last 24 Hours)     ** No results found for the last 24 hours.  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  Her Lexapro does not seem to be effective and Lexapro and other antidepressants have been shown to contribute or cause urinary retention.  Patient has been on Lexapro for several years and has not had this issue previously but with age another medical comorbidities, this could have changed over time.  I also do not feel that it is effective at the current moment in dealing with her depressive symptoms.  -Recommend discontinuing Lexapro and starting Prozac as it is less likely to cause urinary retention and and has led to abatement and urinary retention caused by other SSRIs via multiple case studies and clinical evaluations.  Recommend starting Prozac 20 mg p.o. daily, can discontinue Lexapro and switch directly to Prozac.    I discussed the patients findings and my recommendations with patient    Andrew Chapman MD  10/06/22  12:49 EDT

## 2022-10-06 NOTE — THERAPY EVALUATION
Acute Care - Physical Therapy Initial Evaluation   [place]     Patient Name: Tre Kinney  : 1932  MRN: [id number]  Today's Date: 10/6/2022   Onset of Illness/Injury or Date of Surgery: 10/06/22  Visit Dx:   No diagnosis found.  Patient Active Problem List   Diagnosis   • Acid reflux   • Ankle arthralgia   • Cardiac conduction disorder   • Closed fracture of distal fibula   • Arteriosclerosis of coronary artery   • Fracture of distal end of tibia   • Elevated cholesterol   • BP (high blood pressure)   • Hypertrophic polyarthritis   • OP (osteoporosis)   • Right heart failure (HCC)   • Heart failure, chronic, right-sided (HCC)   • SOB (shortness of breath)   • Palpitations   • CHF (congestive heart failure) (HCC)   • Congestive heart failure (HCC)   • Precordial pain   • Chronic anticoagulation   • Hypokalemia   • Anemia, unspecified type   • Generalized weakness     Past Medical History:   Diagnosis Date   • Anemia    • Arthritis    • CHF (congestive heart failure) (HCC)    • Chronic kidney disease    • Coronary artery disease    • H/O blood clots    • History of transfusion     no reaction    • Hypertension      Past Surgical History:   Procedure Laterality Date   • ABDOMINAL SURGERY     • APPENDECTOMY     • CARDIAC SURGERY     •  SECTION     • COLONOSCOPY N/A 3/7/2018    Procedure: COLONOSCOPY;  Surgeon: Willie Gresham MD;  Location:  [facility];  Service:    • CORONARY ANGIOPLASTY WITH STENT PLACEMENT     • ENDOSCOPY N/A 3/6/2018    Procedure: ESOPHAGOGASTRODUODENOSCOPY;  Surgeon: Willie Gresham MD;  Location:  [facility];  Service:    • PACEMAKER IMPLANTATION      left side    • SKIN BIOPSY     • TUBAL ABDOMINAL LIGATION       PT Assessment (last 12 hours)     PT Evaluation and Treatment     Row Name 10/06/22 1404          Physical Therapy Time and Intention    Subjective Information complains of;weakness;fatigue  -CT     Document Type evaluation  swing bed evaluation; BID sessions   -CT     Mode of Treatment physical therapy  -CT     Patient Effort good  -CT     Symptoms Noted During/After Treatment fatigue  -CT     Comment Pt tolerated eval and treatment fairly well. Pt admitted to swing bed 10/05/2022. Pt was independent prior to falls that lead to recent hospitalization. Pt is Mod/Max A x 1-2 for mobility at time of eval. Anticipated d/c home with 24/7 assist. Pt declined to use TLSO during mobility this date.  -CT     Row Name 10/06/22 1404          General Information    Patient Profile Reviewed yes  -CT     Onset of Illness/Injury or Date of Surgery 10/06/22  -CT     Referring Physician Lagunas  -CT     Patient Observations alert;cooperative;agree to therapy  -CT     Prior Level of Function independent:  prior to fall at home  -CT     Existing Precautions/Restrictions fall;TLSO  -CT     Limitations/Impairments safety/cognitive  -CT     Equipment Issued to Patient gait belt  -CT     Risks Reviewed patient:  -CT     Benefits Reviewed patient:  -CT     Barriers to Rehab cognitive status  -CT     Row Name 10/06/22 1404          Living Environment    Current Living Arrangements home  -CT     People in Home child(abner), adult  -CT     Row Name 10/06/22 1404          Home Use of Assistive/Adaptive Equipment    Equipment Currently Used at Home wheelchair;commode;walker, rolling;oxygen  -CT     Row Name 10/06/22 1404          Cognition    Affect/Mental Status (Cognition) WFL  -CT     Orientation Status (Cognition) oriented x 3  -CT     Follows Commands (Cognition) WFL  -CT     Row Name 10/06/22 1404          Range of Motion Comprehensive    Comment, General Range of Motion BLE grossly WFL  -CT     Row Name 10/06/22 1404          Strength Comprehensive (MMT)    Comment, General Manual Muscle Testing (MMT) Assessment BLE grossly 3+/5  -CT     Row Name 10/06/22 1404          Bed Mobility    Bed Mobility sit-supine;supine-sit;scooting/bridging;rolling left;rolling right  -CT     Rolling Left  [place] (Bed Mobility) moderate assist (50% patient effort);2 person assist  -CT     Rolling Right [place] (Bed Mobility) moderate assist (50% patient effort);2 person assist  -CT     Scooting/Bridging [place] (Bed Mobility) moderate assist (50% patient effort)  -CT     Supine-Sit [place] (Bed Mobility) moderate assist (50% patient effort);2 person assist;verbal cues;nonverbal cues (demo/gesture)  -CT     Sit-Supine [place] (Bed Mobility) moderate assist (50% patient effort);2 person assist;verbal cues;nonverbal cues (demo/gesture)  -CT     Bed Mobility, Safety Issues decreased use of arms for pushing/pulling;decreased use of legs for bridging/pushing;impaired trunk control for bed mobility  -CT     Assistive Device (Bed Mobility) bed rails;draw sheet  -CT     Row Name 10/06/22 1404          Transfers    Transfers bed-chair transfer;chair-bed transfer  -CT     Comment, (Transfers) pt attempts to sit before bed-chair and chair to bed transfers are complete  -CT     Bed-Chair [place] (Transfers) maximum assist (25% patient effort);2 person assist;verbal cues;nonverbal cues (demo/gesture)  -CT     Chair-Bed [place] (Transfers) maximum assist (25% patient effort);2 person assist;verbal cues;nonverbal cues (demo/gesture)  -CT     Assistive Device (Bed-Chair Transfers) walker, front-wheeled  -CT     Sit-Stand [place] (Transfers) moderate assist (50% patient effort);2 person assist;verbal cues  -CT     Stand-Sit [place] (Transfers) moderate assist (50% patient effort);2 person assist;verbal cues  -CT     Row Name 10/06/22 1404          Chair-Bed Transfer    Assistive Device (Chair-Bed Transfers) walker, front-wheeled  -CT     Row Name 10/06/22 1404          Sit-Stand Transfer    Assistive Device (Sit-Stand Transfers) walker, front-wheeled  -CT     Row Name 10/06/22 1404          Stand-Sit Transfer    Assistive Device (Stand-Sit Transfers) walker, front-wheeled  -CT     Row Name  10/06/22 1404          Gait/Stairs (Locomotion)    [place] Level (Gait) moderate assist (50% patient effort);maximum assist (25% patient effort);2 person assist;verbal cues;nonverbal cues (demo/gesture)  -CT     Assistive Device (Gait) walker, front-wheeled  -CT     Distance in Feet (Gait) 3 ft during transfer to chair  -CT     Pattern (Gait) step-to  -CT     Deviations/Abnormal Patterns (Gait) gait speed decreased;festinating/shuffling;weight shifting decreased  -CT     Bilateral Gait Deviations weight shift ability decreased;forward flexed posture  -CT     Comment, (Gait/Stairs) significant forward flexed posture  -CT     Row Name 10/06/22 1404          Balance    Balance Assessment sitting static balance;standing static balance  -CT     Static Sitting Balance supervision  -CT     Position, Sitting Balance sitting edge of bed  -CT     Static Standing Balance moderate assist;2-person assist;verbal cues;non-verbal cues (demo/gesture)  -CT     Position/Device Used, Standing Balance walker, front-wheeled  -CT     Row Name 10/06/22 1404          Motor Skills    Therapeutic Exercise --  BLE sitting ther ex: LAQ, AP, seated marches, hip ab/add  -CT     Row Name 10/06/22 1404          Plan of Care Review    Plan of Care Reviewed With patient  -CT     Row Name 10/06/22 1404          Positioning and Restraints    Pre-Treatment Position in bed  -CT     In Bed supine;call light within reach;encouraged to call for assist;exit alarm on;side rails up x3  after second session  -CT     In Chair sitting;call light within reach;encouraged to call for assist  pt sat up in chair aprox 1 hour  -CT     Row Name 10/06/22 1404          Therapy Assessment/Plan (PT)    Patient/Family Therapy Goals Statement (PT) Pt goals are to return to PLOF  -CT     Functional Level at Time of Evaluation (PT) Mod/Max A  -CT     PT Diagnosis (PT) decreased functional mobility  -CT     Rehab Potential (PT) fair, will monitor progress closely  -CT      Criteria for Skilled Interventions Met (PT) yes;meets criteria  -CT     Therapy Frequency (PT) 5 times/wk  5-6 times/wk  -CT     Predicted Duration of Therapy Intervention (PT) 2 weeks  -CT     Row Name 10/06/22 1404          Therapy Plan Review/Discharge Plan (PT)    Therapy Plan Review (PT) evaluation/treatment results reviewed;care plan/treatment goals reviewed;risks/benefits reviewed;current/potential barriers reviewed;participants voiced agreement with care plan;participants included;patient  -CT     Row Name 10/06/22 1404          Physical Therapy Goals    Bed Mobility Goal Selection (PT) bed mobility, PT goal 1  -CT     Transfer Goal Selection (PT) transfer, PT goal 1  -CT     Gait Training Goal Selection (PT) gait training, PT goal 1  -CT     Row Name 10/06/22 1404          Bed Mobility Goal 1 (PT)    Activity/Assistive Device (Bed Mobility Goal 1, PT) bed mobility activities, all  -CT     [place] Level/Cues Needed (Bed Mobility Goal 1, PT) minimum assist (75% or more patient effort)  -CT     Time Frame (Bed Mobility Goal 1, PT) by discharge  -CT     Row Name 10/06/22 1404          Transfer Goal 1 (PT)    Activity/Assistive Device (Transfer Goal 1, PT) sit-to-stand/stand-to-sit;bed-to-chair/chair-to-bed  -CT     [place]/Cues Needed (Transfer Goal 1, PT) minimum assist (75% or more patient effort)  -CT     Time Frame (Transfer Goal 1, PT) by discharge  -CT     Row Name 10/06/22 1404          Gait Training Goal 1 (PT)    Activity/Assistive Device (Gait Training Goal 1, PT) gait (walking locomotion);assistive device use  -CT     [place] Level (Gait Training Goal 1, PT) minimum assist (75% or more patient effort)  -CT     Distance (Gait Training Goal 1, PT) 30  -CT     Time Frame (Gait Training Goal 1, PT) by discharge  -CT           User Key  (r) = Recorded By, (t) = Taken By, (c) = Cosigned By    Initials Name Provider Type    CT Bhargavi Vásquez, PT Physical Therapist                  PT  Recommendation and Plan  Anticipated Discharge Disposition (PT): home with 24/7 care, home with home health  Planned Therapy Interventions (PT): balance training, bed mobility training, gait training, home exercise program, manual therapy techniques, motor coordination training, neuromuscular re-education, patient/family education, postural re-education, strengthening, transfer training  Therapy Frequency (PT): 5 times/wk (5-6 times/wk)  Plan of Care Reviewed With: patient       Time Calculation:    PT Charges     Row Name 10/06/22 1421 10/06/22 1420          Time Calculation    PT Non-Billable Time (min) -- 54 min  -CT     PT Received On 10/06/22  -CT 10/06/22  -CT     PT Goal Re-Cert Due Date 10/20/22  -CT 10/20/22  -CT            Time Calculation- PT    Total Timed Code Minutes- PT 34 minute(s)  second treatment session  -CT --           User Key  (r) = Recorded By, (t) = Taken By, (c) = Cosigned By    Initials Name Provider Type    CT Bhargavi Vásquez, PT Physical Therapist              Therapy Charges for Today     Code Description Service Date Service Provider Modifiers Qty    86633336934 HC PT EVAL MOD COMPLEXITY 4 10/6/2022 Bhargavi Vásquez, PT GP 1    53568533281 HC PT THERAPEUTIC ACT EA 15 MIN 10/6/2022 Bhargavi Vásquez, PT GP 1    98208477759 HC GAIT TRAINING EA 15 MIN 10/6/2022 Bhargavi Vásquez, PT GP 1          PT G-Codes  AM-PAC 6 Clicks Score (PT): 10    Bhargavi Vásquez PT  10/6/2022

## 2022-10-06 NOTE — PLAN OF CARE
Goal Outcome Evaluation:              Outcome Evaluation: Pt resting in bed. See previous nursing note regarding urinary retention. No other acute changes noted. Pt has no complaints at this time. Will continue to monitor

## 2022-10-06 NOTE — PLAN OF CARE
Goal Outcome Evaluation:               Pt resting with no complaints at this time. Pt worked with PT this shift.

## 2022-10-07 LAB
ANION GAP SERPL CALCULATED.3IONS-SCNC: 19.7 MMOL/L (ref 5–15)
BUN SERPL-MCNC: 31 MG/DL (ref 8–23)
BUN/CREAT SERPL: 14.3 (ref 7–25)
CALCIUM SPEC-SCNC: 9.4 MG/DL (ref 8.6–10.5)
CHLORIDE SERPL-SCNC: 97 MMOL/L (ref 98–107)
CO2 SERPL-SCNC: 19.3 MMOL/L (ref 22–29)
CREAT SERPL-MCNC: 2.17 MG/DL (ref 0.57–1)
DEPRECATED RDW RBC AUTO: 72.6 FL (ref 37–54)
EGFRCR SERPLBLD CKD-EPI 2021: 21.3 ML/MIN/1.73
ERYTHROCYTE [DISTWIDTH] IN BLOOD BY AUTOMATED COUNT: 20.4 % (ref 12.3–15.4)
GLUCOSE SERPL-MCNC: 91 MG/DL (ref 65–99)
HBA1C MFR BLD: 4.4 % (ref 4.8–5.6)
HCT VFR BLD AUTO: 33.5 % (ref 34–46.6)
HGB BLD-MCNC: 10.4 G/DL (ref 12–15.9)
MAGNESIUM SERPL-MCNC: 2 MG/DL (ref 1.6–2.4)
MCH RBC QN AUTO: 30 PG (ref 26.6–33)
MCHC RBC AUTO-ENTMCNC: 31 G/DL (ref 31.5–35.7)
MCV RBC AUTO: 96.5 FL (ref 79–97)
PHOSPHATE SERPL-MCNC: 4 MG/DL (ref 2.5–4.5)
PLATELET # BLD AUTO: 250 10*3/MM3 (ref 140–450)
PMV BLD AUTO: 10.3 FL (ref 6–12)
POTASSIUM SERPL-SCNC: 3.9 MMOL/L (ref 3.5–5.2)
RBC # BLD AUTO: 3.47 10*6/MM3 (ref 3.77–5.28)
SODIUM SERPL-SCNC: 136 MMOL/L (ref 136–145)
TSH SERPL DL<=0.05 MIU/L-ACNC: 3.03 UIU/ML (ref 0.27–4.2)
WBC NRBC COR # BLD: 7.41 10*3/MM3 (ref 3.4–10.8)

## 2022-10-07 PROCEDURE — 97530 THERAPEUTIC ACTIVITIES: CPT

## 2022-10-07 PROCEDURE — 83735 ASSAY OF MAGNESIUM: CPT | Performed by: INTERNAL MEDICINE

## 2022-10-07 PROCEDURE — 84100 ASSAY OF PHOSPHORUS: CPT | Performed by: INTERNAL MEDICINE

## 2022-10-07 PROCEDURE — 97110 THERAPEUTIC EXERCISES: CPT

## 2022-10-07 PROCEDURE — 94799 UNLISTED PULMONARY SVC/PX: CPT

## 2022-10-07 PROCEDURE — 94761 N-INVAS EAR/PLS OXIMETRY MLT: CPT

## 2022-10-07 PROCEDURE — 80048 BASIC METABOLIC PNL TOTAL CA: CPT | Performed by: INTERNAL MEDICINE

## 2022-10-07 PROCEDURE — 97116 GAIT TRAINING THERAPY: CPT

## 2022-10-07 PROCEDURE — 25010000002 HEPARIN (PORCINE) PER 1000 UNITS: Performed by: INTERNAL MEDICINE

## 2022-10-07 PROCEDURE — 84443 ASSAY THYROID STIM HORMONE: CPT | Performed by: INTERNAL MEDICINE

## 2022-10-07 PROCEDURE — 83036 HEMOGLOBIN GLYCOSYLATED A1C: CPT | Performed by: INTERNAL MEDICINE

## 2022-10-07 PROCEDURE — 85027 COMPLETE CBC AUTOMATED: CPT | Performed by: INTERNAL MEDICINE

## 2022-10-07 RX ADMIN — HYDRALAZINE HYDROCHLORIDE 50 MG: 50 TABLET, FILM COATED ORAL at 05:43

## 2022-10-07 RX ADMIN — IPRATROPIUM BROMIDE AND ALBUTEROL SULFATE 3 ML: .5; 2.5 SOLUTION RESPIRATORY (INHALATION) at 06:30

## 2022-10-07 RX ADMIN — HEPARIN SODIUM 5000 UNITS: 5000 INJECTION INTRAVENOUS; SUBCUTANEOUS at 21:54

## 2022-10-07 RX ADMIN — METOPROLOL TARTRATE 12.5 MG: 25 TABLET, FILM COATED ORAL at 08:22

## 2022-10-07 RX ADMIN — GABAPENTIN 300 MG: 300 CAPSULE ORAL at 21:55

## 2022-10-07 RX ADMIN — Medication 10 ML: at 08:23

## 2022-10-07 RX ADMIN — HEPARIN SODIUM 5000 UNITS: 5000 INJECTION INTRAVENOUS; SUBCUTANEOUS at 08:22

## 2022-10-07 RX ADMIN — Medication 10 MG: at 21:55

## 2022-10-07 RX ADMIN — DOCUSATE SODIUM 100 MG: 100 CAPSULE ORAL at 21:55

## 2022-10-07 RX ADMIN — Medication 10 ML: at 21:58

## 2022-10-07 RX ADMIN — ESCITALOPRAM 10 MG: 10 TABLET, FILM COATED ORAL at 08:22

## 2022-10-07 RX ADMIN — FUROSEMIDE 40 MG: 40 TABLET ORAL at 08:22

## 2022-10-07 RX ADMIN — FUROSEMIDE 40 MG: 40 TABLET ORAL at 17:58

## 2022-10-07 RX ADMIN — DOCUSATE SODIUM 100 MG: 100 CAPSULE ORAL at 08:23

## 2022-10-07 RX ADMIN — HYDRALAZINE HYDROCHLORIDE 50 MG: 50 TABLET, FILM COATED ORAL at 15:05

## 2022-10-07 RX ADMIN — PANTOPRAZOLE SODIUM 40 MG: 40 TABLET, DELAYED RELEASE ORAL at 05:43

## 2022-10-07 NOTE — THERAPY TREATMENT NOTE
Acute Care - Physical Therapy Treatment Note   [facility]     Patient Name: Tre Kinney  : 1932  MRN: [id number]  Today's Date: 10/7/2022   Onset of Illness/Injury or Date of Surgery: 10/06/22  Visit Dx:   No diagnosis found.  Patient Active Problem List   Diagnosis   • Acid reflux   • Ankle arthralgia   • Cardiac conduction disorder   • Closed fracture of distal fibula   • Arteriosclerosis of coronary artery   • Fracture of distal end of tibia   • Elevated cholesterol   • BP (high blood pressure)   • Hypertrophic polyarthritis   • OP (osteoporosis)   • Right heart failure (HCC)   • Heart failure, chronic, right-sided (HCC)   • SOB (shortness of breath)   • Palpitations   • CHF (congestive heart failure) (HCC)   • Congestive heart failure (HCC)   • Precordial pain   • Chronic anticoagulation   • Hypokalemia   • Anemia, unspecified type   • Generalized weakness     Past Medical History:   Diagnosis Date   • Anemia    • Arthritis    • CHF (congestive heart failure) (HCC)    • Chronic kidney disease    • Coronary artery disease    • H/O blood clots    • History of transfusion     no reaction    • Hypertension      Past Surgical History:   Procedure Laterality Date   • ABDOMINAL SURGERY     • APPENDECTOMY     • CARDIAC SURGERY     •  SECTION     • COLONOSCOPY N/A 3/7/2018    Procedure: COLONOSCOPY;  Surgeon: Willie Gresham MD;  Location:  [facility];  Service:    • CORONARY ANGIOPLASTY WITH STENT PLACEMENT     • ENDOSCOPY N/A 3/6/2018    Procedure: ESOPHAGOGASTRODUODENOSCOPY;  Surgeon: Willie Gresham MD;  Location:  [facility];  Service:    • PACEMAKER IMPLANTATION      left side    • SKIN BIOPSY     • TUBAL ABDOMINAL LIGATION       PT Assessment (last 12 hours)     PT Evaluation and Treatment     Row Name 10/07/22 1419          Physical Therapy Time and Intention    Subjective Information complains of;weakness;fatigue;pain  -CT     Document Type therapy note (daily note)  BID sessions  -CT      Mode of Treatment physical therapy  -CT     Patient Effort good  -CT     Row Name 10/07/22 1419          General Information    Patient Profile Reviewed yes  -CT     Existing Precautions/Restrictions fall;TLSO  -CT     Limitations/Impairments safety/cognitive  -CT     Row Name 10/07/22 1419          Home Use of Assistive/Adaptive Equipment    Equipment Currently Used at Home wheelchair;commode;walker, rolling;oxygen  -CT     Row Name 10/07/22 1419          Cognition    Affect/Mental Status (Cognition) WFL  -CT     Orientation Status (Cognition) oriented x 3  -CT     Follows Commands (Cognition) WFL  -CT     Row Name 10/07/22 1419          Bed Mobility    Bed Mobility sit-supine;supine-sit;scooting/bridging;rolling left;rolling right  -CT     Rolling Left [place] (Bed Mobility) moderate assist (50% patient effort);2 person assist  -CT     Rolling Right [place] (Bed Mobility) moderate assist (50% patient effort);2 person assist  -CT     Scooting/Bridging [place] (Bed Mobility) moderate assist (50% patient effort)  -CT     Supine-Sit [place] (Bed Mobility) moderate assist (50% patient effort);2 person assist;verbal cues;nonverbal cues (demo/gesture)  -CT     Sit-Supine [place] (Bed Mobility) moderate assist (50% patient effort);2 person assist;verbal cues;nonverbal cues (demo/gesture)  -CT     Bed Mobility, Safety Issues decreased use of arms for pushing/pulling;decreased use of legs for bridging/pushing;impaired trunk control for bed mobility  -CT     Assistive Device (Bed Mobility) bed rails;draw sheet  -CT     Row Name 10/07/22 1419          Transfers    Transfers bed-chair transfer;chair-bed transfer  -CT     Bed-Chair [place] (Transfers) maximum assist (25% patient effort);2 person assist;verbal cues;nonverbal cues (demo/gesture)  -CT     Chair-Bed [place] (Transfers) maximum assist (25% patient effort);2 person assist;verbal cues;nonverbal cues (demo/gesture)  -CT      Assistive Device (Bed-Chair Transfers) walker, front-wheeled  -CT     Sit-Stand [place] (Transfers) moderate assist (50% patient effort);2 person assist;verbal cues  -CT     Stand-Sit [place] (Transfers) moderate assist (50% patient effort);2 person assist;verbal cues  -CT     Row Name 10/07/22 1419          Chair-Bed Transfer    Assistive Device (Chair-Bed Transfers) walker, front-wheeled  -CT     Row Name 10/07/22 1419          Sit-Stand Transfer    Assistive Device (Sit-Stand Transfers) walker, front-wheeled  -CT     Row Name 10/07/22 1419          Stand-Sit Transfer    Assistive Device (Stand-Sit Transfers) walker, front-wheeled  -CT     Row Name 10/07/22 1419          Gait/Stairs (Locomotion)    [place] Level (Gait) moderate assist (50% patient effort);maximum assist (25% patient effort);2 person assist;verbal cues;nonverbal cues (demo/gesture)  -CT     Assistive Device (Gait) walker, front-wheeled  -CT     Distance in Feet (Gait) 4  -CT     Pattern (Gait) step-to  -CT     Deviations/Abnormal Patterns (Gait) gait speed decreased;festinating/shuffling;weight shifting decreased  -CT     Bilateral Gait Deviations weight shift ability decreased;forward flexed posture  -CT     Row Name 10/07/22 1419          Positioning and Restraints    Pre-Treatment Position in bed  -CT     In Bed supine;call light within reach;encouraged to call for assist;exit alarm on;side rails up x3  -CT     In Chair sitting;call light within reach;encouraged to call for assist;notified nsg  pt sat up in chair for aprox 1.5 hours  -CT     Row Name 10/07/22 1419          Therapy Assessment/Plan (PT)    Rehab Potential (PT) fair, will monitor progress closely  -CT     Criteria for Skilled Interventions Met (PT) yes;meets criteria  -CT     Therapy Frequency (PT) 5 times/wk  5-6 times/wk  -CT           User Key  (r) = Recorded By, (t) = Taken By, (c) = Cosigned By    Initials Name Provider Type    CT Bhargavi Vásquez, PT Physical  Therapist                  PT Recommendation and Plan  Anticipated Discharge Disposition (PT): home with 24/7 care, home with home health  Planned Therapy Interventions (PT): balance training, bed mobility training, gait training, home exercise program, manual therapy techniques, motor coordination training, neuromuscular re-education, patient/family education, postural re-education, strengthening, transfer training  Therapy Frequency (PT): 5 times/wk (5-6 times/wk)  Plan of Care Reviewed With: patient   Outcome Measures     Row Name 10/07/22 1200             How much help from another is currently needed...    Putting on and taking off regular lower body clothing? 1  -LA      Bathing (including washing, rinsing, and drying) 2  -LA      Toileting (which includes using toilet bed pan or urinal) 2  -LA      Putting on and taking off regular upper body clothing 2  -LA      Taking care of personal grooming (such as brushing teeth) 3  -LA      Eating meals 3  -LA      AM-PAC 6 Clicks Score (OT) 13  -LA              Functional Assessment    Outcome Measure Options AM-PAC 6 Clicks Daily Activity (OT)  -LA            User Key  (r) = Recorded By, (t) = Taken By, (c) = Cosigned By    Initials Name Provider Type    Dianna Barreto OT Occupational Therapist                 Time Calculation:    PT Charges     Row Name 10/07/22 1423 10/07/22 1422          Time Calculation    PT Received On 10/07/22  -CT 10/07/22  -CT            Time Calculation- PT    Total Timed Code Minutes- PT 31 minute(s)  second treatment session  -CT 42 minute(s)  first treatment session  -CT           User Key  (r) = Recorded By, (t) = Taken By, (c) = Cosigned By    Initials Name Provider Type    CT Bhargavi Vásquez PT Physical Therapist              Therapy Charges for Today     Code Description Service Date Service Provider Modifiers Qty    53147047203  PT EVAL MOD COMPLEXITY 4 10/6/2022 Bhargavi Vásquez, PT GP 1    99975741802  PT THERAPEUTIC ACT  EA 15 MIN 10/6/2022 Bhargavi Vásquez, PT GP 1    93936394650 HC GAIT TRAINING EA 15 MIN 10/6/2022 Bhargavi Vásquez, PT GP 1    94452086154 HC GAIT TRAINING EA 15 MIN 10/7/2022 Bhargavi Vásquez, PT GP 1    75250336717 HC PT THERAPEUTIC ACT EA 15 MIN 10/7/2022 Bhargavi Vásquez, PT GP 1    09513027685 HC PT THER PROC EA 15 MIN 10/7/2022 Bhargavi Vásquez, PT GP 1    71396260177 HC GAIT TRAINING EA 15 MIN 10/7/2022 Bhargavi Vásquez, PT GP 1    10345572516 HC PT THERAPEUTIC ACT EA 15 MIN 10/7/2022 Bhargavi Vásquez, PT GP 1          PT G-Codes  Outcome Measure Options: AM-PAC 6 Clicks Daily Activity (OT)  AM-PAC 6 Clicks Score (PT): 10  AM-PAC 6 Clicks Score (OT): 13    Bhargavi Vásquez, PT  10/7/2022

## 2022-10-07 NOTE — PROGRESS NOTES
[facility]    Patient Identification:  Name:  Tre Kinney  Age:  89 y.o.  Sex:  female  :  1932  MRN:  [id number]  Visit number:  [id number]  Primary Care Provider:  Jessica Pedro APRN    Length of stay in inpatient status:  2    Brief Update     I personally saw the patient today and I discussed the patient with bedside nurse Nehal.  She was sitting in a chair.  She states that she is feeling well.  She denies chest pain, denies nausea, denies emesis, denies diarrhea, denies shortness of air, and denies coughing.  She is eating well.  I have reviewed the labs and so far the labs are trending the same as they had been.  Vital signs are normal and stable.  Will plan on labs again in a few days, unless the nursing staff has concerns.    LABS:    CBC and coagulation:  Results from last 7 days   Lab Units 10/07/22  0047 10/05/22  0311 10/04/22  0116   WBC 10*3/mm3 7.41 6.68 5.82   HEMOGLOBIN g/dL 10.4* 9.5* 8.9*   HEMATOCRIT % 33.5* 30.8* 28.7*   MCV fL 96.5 95.7 96.3   MCHC g/dL 31.0* 30.8* 31.0*   PLATELETS 10*3/mm3 250 247 250     Renal and electrolytes:  Results from last 7 days   Lab Units 10/07/22  0047 10/05/22  0311 10/04/22  0116   SODIUM mmol/L 136 137 139   POTASSIUM mmol/L 3.9 3.7 3.6   MAGNESIUM mg/dL 2.0 2.0 2.0   CHLORIDE mmol/L 97* 98 102   CO2 mmol/L 19.3* 24.1 25.0   BUN mg/dL 31* 21 23   CREATININE mg/dL 2.17* 1.90* 1.92*   CALCIUM mg/dL 9.4 9.3 9.1   PHOSPHORUS mg/dL 4.0 3.5 3.8   GLUCOSE mg/dL 91 101* 107*     Estimated Creatinine Clearance: 19.7 mL/min (A) (by C-G formula based on SCr of 2.17 mg/dL (H)).    Liver and pancreatic function:  Results from last 7 days   Lab Units 10/05/22  0311 10/04/22  0116 10/03/22  0304   ALBUMIN g/dL 3.20* 2.99* 3.10*   BILIRUBIN mg/dL 0.8 0.6 0.8   ALK PHOS U/L 66 64 66   AST (SGOT) U/L 11 13 14   ALT (SGPT) U/L 6 7 8     Endocrine function:  Lab Results   Component Value Date    HGBA1C 4.40 (L) 10/07/2022      Lab Results   Component Value Date    TSH 3.030 10/07/2022          I have personally looked at the labs and they are stated above.      Diagnoses from the last hospitalization:  -Acute blood loss anemia that was present on admission and due to a right hip traumatic hematoma in the setting of the supratherapeutic INR, status post 3 units packed red blood cells transfused this admission  -Supratherapeutic INR  that was present on admission, in a patient on chronic anticoagulation with Coumadin  for paroxysmal atrial fibrillation (goal INR 2.0-3.0), nail anticoagulation has been discontinued  -Acute superior endplate compression fracture of L1 with retropulsion (9/8/2022)  -Leukocytosis on admission, suspect due to mechanical fall that occurred prior to admission  -Acute kidney injury on top of chronic kidney disease stage IV (baseline creatinine 2.1-2.4 and admission creatinine 6.1)  -Anion gap metabolic acidosis present on admission, suspect due to a combination of lactic acidosis from the acute blood loss anemia and renal acidosis  -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

## 2022-10-07 NOTE — THERAPY TREATMENT NOTE
Acute Care - Occupational Therapy Treatment Note   [place]     Patient Name: Tre Kinney  : 1932  MRN: [id number]  Today's Date: 10/7/2022  Onset of Illness/Injury or Date of Surgery: 10/06/22     Referring Physician: Janneth    Admit Date: 10/5/2022     No diagnosis found.  Patient Active Problem List   Diagnosis   • Acid reflux   • Ankle arthralgia   • Cardiac conduction disorder   • Closed fracture of distal fibula   • Arteriosclerosis of coronary artery   • Fracture of distal end of tibia   • Elevated cholesterol   • BP (high blood pressure)   • Hypertrophic polyarthritis   • OP (osteoporosis)   • Right heart failure (HCC)   • Heart failure, chronic, right-sided (HCC)   • SOB (shortness of breath)   • Palpitations   • CHF (congestive heart failure) (HCC)   • Congestive heart failure (HCC)   • Precordial pain   • Chronic anticoagulation   • Hypokalemia   • Anemia, unspecified type   • Generalized weakness     Past Medical History:   Diagnosis Date   • Anemia    • Arthritis    • CHF (congestive heart failure) ([facility])    • Chronic kidney disease    • Coronary artery disease    • H/O blood clots    • History of transfusion     no reaction    • Hypertension      Past Surgical History:   Procedure Laterality Date   • ABDOMINAL SURGERY     • APPENDECTOMY     • CARDIAC SURGERY     •  SECTION     • COLONOSCOPY N/A 3/7/2018    Procedure: COLONOSCOPY;  Surgeon: Willie Gresham MD;  Location:  [facility];  Service:    • CORONARY ANGIOPLASTY WITH STENT PLACEMENT     • ENDOSCOPY N/A 3/6/2018    Procedure: ESOPHAGOGASTRODUODENOSCOPY;  Surgeon: Willie Gresham MD;  Location:  [facility];  Service:    • PACEMAKER IMPLANTATION      left side    • SKIN BIOPSY     • TUBAL ABDOMINAL LIGATION           OT ASSESSMENT FLOWSHEET (last 12 hours)     OT Evaluation and Treatment     Row Name 10/07/22 1252                   OT Time and Intention    Document Type therapy note (daily note)  -LA        Mode of  Treatment occupational therapy  -LA        Patient Effort good  -LA        Symptoms Noted During/After Treatment fatigue  -LA                  General Information    Patient/Family/Caregiver Comments/Observations Patient agreeable to therapy this date. Patient reports fatigue during exercises but is motivated to maximize independence with ADLs and verbalizes desire to return home.  -LA                  Cognition    Affect/Mental Status (Cognition) WFL  -LA        Orientation Status (Cognition) oriented x 3  -LA        Follows Commands (Cognition) WFL  -LA                  Transfers    Bed-Chair Cotton (Transfers) moderate assist (50% patient effort);2 person assist;verbal cues;nonverbal cues (demo/gesture)  -LA        Assistive Device (Bed-Chair Transfers) walker, front-wheeled  -LA        Sit-Stand Cotton (Transfers) minimum assist (75% patient effort);moderate assist (50% patient effort);2 person assist  -LA        Stand-Sit Cotton (Transfers) minimum assist (75% patient effort);moderate assist (50% patient effort);2 person assist  -LA                  Sit-Stand Transfer    Assistive Device (Sit-Stand Transfers) walker, front-wheeled  -LA                  Motor Skills    Motor Skills coordination;functional endurance  -LA        Coordination minimal impairment;upper extremity;fine motor deficit;gross motor deficit  -LA        Functional Endurance fair-  -LA        Therapeutic Exercise shoulder;elbow/forearm;wrist;hand  -LA        Additional Documentation --  yellow theraband exercises completed in all planes 10 x2 sets  -LA                  Balance    Dynamic Sitting Balance minimal assist  -LA        Balance Interventions minimal challenge;dynamic  -LA                  Plan of Care Review    Plan of Care Reviewed With patient  -LA        Progress improving  -LA                  Positioning and Restraints    Pre-Treatment Position in bed  -LA        Post Treatment Position chair  -LA        In  Chair sitting;call light within reach;encouraged to call for assist  -LA              User Key  (r) = Recorded By, (t) = Taken By, (c) = Cosigned By    Initials Name Effective Dates    Dianna Barreto, OT 02/14/22 -                        OT Recommendation and Plan  Planned Therapy Interventions (OT): activity tolerance training, patient/caregiver education/training, adaptive equipment training, ROM/therapeutic exercise, occupation/activity based interventions, BADL retraining, strengthening exercise, transfer/mobility retraining, functional balance retraining  Therapy Frequency (OT): 6 times/wk  Plan of Care Review  Plan of Care Reviewed With: patient  Progress: improving  Outcome Evaluation: OT evaluation completed this date. Patient engaged in UE tasks with focus on trunk control and fx reaching to promote increased independence with ADLs. Patient has had a significant decline in independence and safety with ADLs resulting in need for skilled OT services. OT to address deficits to allow patient to max level of independence prior to d/c. Patient admitted for swing bed and OT discussed activities. Patient given word search puzzles to promote increase activity. patient also reports she watches television. Family also stated they could bring in books for entertainment as needed.  Plan of Care Reviewed With: patient  Outcome Evaluation: OT evaluation completed this date. Patient engaged in UE tasks with focus on trunk control and fx reaching to promote increased independence with ADLs. Patient has had a significant decline in independence and safety with ADLs resulting in need for skilled OT services. OT to address deficits to allow patient to max level of independence prior to d/c. Patient admitted for swing bed and OT discussed activities. Patient given word search puzzles to promote increase activity. patient also reports she watches television. Family also stated they could bring in books for entertainment as  needed.     Outcome Measures     Row Name 10/07/22 1200             How much help from another is currently needed...    Putting on and taking off regular lower body clothing? 1  -LA      Bathing (including washing, rinsing, and drying) 2  -LA      Toileting (which includes using toilet bed pan or urinal) 2  -LA      Putting on and taking off regular upper body clothing 2  -LA      Taking care of personal grooming (such as brushing teeth) 3  -LA      Eating meals 3  -LA      AM-PAC 6 Clicks Score (OT) 13  -LA              Functional Assessment    Outcome Measure Options AM-PAC 6 Clicks Daily Activity (OT)  -LA            User Key  (r) = Recorded By, (t) = Taken By, (c) = Cosigned By    Initials Name Provider Type    Dianna Barreto OT Occupational Therapist                Time Calculation:    Time Calculation- OT     Row Name 10/07/22 1257             Time Calculation- OT    Total Timed Code Minutes- OT 45 minute(s)  -LA            User Key  (r) = Recorded By, (t) = Taken By, (c) = Cosigned By    Initials Name Provider Type    Dianna Barreto OT Occupational Therapist              Therapy Charges for Today     Code Description Service Date Service Provider Modifiers Qty    65996192338 HC OT THER PROC EA 15 MIN 10/6/2022 Dianna Siddiqi OT GO 1    23759935475 HC OT THERAPEUTIC ACT EA 15 MIN 10/6/2022 Dianna Siddiqi OT GO 1    98748242267 HC OT EVAL MOD COMPLEXITY 4 10/6/2022 Dianna Siddiqi OT GO 1    03957676973 HC OT THER PROC EA 15 MIN 10/7/2022 Dianna Siddiqi OT GO 1    50635273290 HC OT THERAPEUTIC ACT EA 15 MIN 10/7/2022 Dianna Siddiqi OT GO 2               Dianna Siddiqi OT  10/7/2022

## 2022-10-07 NOTE — PLAN OF CARE
Goal Outcome Evaluation:              Outcome Evaluation: Patient rested in bed today, alert and oriented, no complaints, will monitor.

## 2022-10-07 NOTE — CASE MANAGEMENT/SOCIAL WORK
Discharge Planning Assessment  [facility]     Patient Name: Tre Kinney  MRN: [id number]  Today's Date: 10/7/2022    Admit Date: 10/5/2022    Plan: Pt was approved for 10 days of swing bed admission on 10/5/22. Pt was admitted to swing bed on 10/5/22. Pt lives with her son, Valdez and she plans to return home at discharge. Pt did not utilize home health services prior to admission. Pt has O2 PRN, wheelchair, rolling walker and bedside commode via [facility]. PCP is Jessica Pedro. Pt did not have a POA or living will. SS to follow and assist as needed with discharge planning.     Discharge Plan     Row Name 10/07/22 0838       Plan    Plan Pt was approved for 10 days of swing bed admission on 10/5/22. Pt was admitted to swing bed on 10/5/22. Pt lives with her son, Valdez and she plans to return home at discharge. Pt did not utilize home health services prior to admission. Pt has O2 PRN, wheelchair, rolling walker and bedside commode via [facility]. PCP is Jessica Pedro. Pt did not have a POA or living will. SS to follow and assist as needed with discharge planning.              Continued Care and Services - Admitted Since 10/5/2022    Coordination has not been started for this encounter.     Selected Continued Care - Prior Encounters Includes selections from prior encounters from 7/7/2022 to 10/7/2022    Discharged on 10/5/2022 Admission date: 9/19/2022 - Discharge disposition: Swing Bed    Destination     Service Provider Selected Services Address Phone Fax Patient Preferred     [facility] [address] [place] [address] [place] [address] [phone] -- --                  LENORE Koch

## 2022-10-08 PROCEDURE — 97110 THERAPEUTIC EXERCISES: CPT

## 2022-10-08 PROCEDURE — 94761 N-INVAS EAR/PLS OXIMETRY MLT: CPT

## 2022-10-08 PROCEDURE — 94799 UNLISTED PULMONARY SVC/PX: CPT

## 2022-10-08 PROCEDURE — 25010000002 HEPARIN (PORCINE) PER 1000 UNITS: Performed by: INTERNAL MEDICINE

## 2022-10-08 PROCEDURE — 97535 SELF CARE MNGMENT TRAINING: CPT

## 2022-10-08 RX ADMIN — GABAPENTIN 300 MG: 300 CAPSULE ORAL at 21:32

## 2022-10-08 RX ADMIN — METOPROLOL TARTRATE 12.5 MG: 25 TABLET, FILM COATED ORAL at 21:32

## 2022-10-08 RX ADMIN — IPRATROPIUM BROMIDE AND ALBUTEROL SULFATE 3 ML: .5; 2.5 SOLUTION RESPIRATORY (INHALATION) at 19:38

## 2022-10-08 RX ADMIN — PANTOPRAZOLE SODIUM 40 MG: 40 TABLET, DELAYED RELEASE ORAL at 05:44

## 2022-10-08 RX ADMIN — HEPARIN SODIUM 5000 UNITS: 5000 INJECTION INTRAVENOUS; SUBCUTANEOUS at 21:32

## 2022-10-08 RX ADMIN — HEPARIN SODIUM 5000 UNITS: 5000 INJECTION INTRAVENOUS; SUBCUTANEOUS at 08:41

## 2022-10-08 RX ADMIN — HYDRALAZINE HYDROCHLORIDE 50 MG: 50 TABLET, FILM COATED ORAL at 05:44

## 2022-10-08 RX ADMIN — Medication 10 ML: at 08:41

## 2022-10-08 RX ADMIN — HYDRALAZINE HYDROCHLORIDE 50 MG: 50 TABLET, FILM COATED ORAL at 21:32

## 2022-10-08 RX ADMIN — ESCITALOPRAM 10 MG: 10 TABLET, FILM COATED ORAL at 08:40

## 2022-10-08 RX ADMIN — FUROSEMIDE 40 MG: 40 TABLET ORAL at 08:40

## 2022-10-08 RX ADMIN — Medication 10 ML: at 21:31

## 2022-10-08 RX ADMIN — DOCUSATE SODIUM 100 MG: 100 CAPSULE ORAL at 08:40

## 2022-10-08 RX ADMIN — FUROSEMIDE 40 MG: 40 TABLET ORAL at 17:36

## 2022-10-08 RX ADMIN — Medication 10 MG: at 21:32

## 2022-10-08 NOTE — PLAN OF CARE
Goal Outcome Evaluation:  Plan of Care Reviewed With: patient        Progress: no change  Outcome Evaluation: Pt has rested in bed overnight. VSS. No acute changes.

## 2022-10-08 NOTE — PROGRESS NOTES
[facility] HOSPITALISTS CROSS COVER NOTE    Patient Identification:  Name:  Tre Kinney  Age:  89 y.o.  Sex:  female  :  1932  MRN:  [id number]  Visit number:  [id number]  Primary Care Provider:  Jessica Pedro APRN    Length of stay in inpatient status:  3    Brief Update     Discussed with bedside nurse, Nehal.  The patient has no complaints today; specifically, she denies chest pain, trouble breathing, bloody/black bowel movements.  I have reviewed the vital signs and they are normal and stable.  Since the patient doesn't have any new issues today, I will not see her.  However, bedside nurse Nehal knows to call me with any issues.  Will repeat labs in a couple of days.  She is to continue with PT.    Jannet Lagunas MD  [facility]  10/08/22  10:01 EDT

## 2022-10-08 NOTE — THERAPY TREATMENT NOTE
Acute Care - Occupational Therapy Treatment Note   [facility]     Patient Name: Tre Kinney  : 1932  MRN: [id number]  Today's Date: 10/8/2022  Onset of Illness/Injury or Date of Surgery: 10/06/22     Referring Physician: Janneth    Admit Date: 10/5/2022     No diagnosis found.  Patient Active Problem List   Diagnosis   • Acid reflux   • Ankle arthralgia   • Cardiac conduction disorder   • Closed fracture of distal fibula   • Arteriosclerosis of coronary artery   • Fracture of distal end of tibia   • Elevated cholesterol   • BP (high blood pressure)   • Hypertrophic polyarthritis   • OP (osteoporosis)   • Right heart failure (HCC)   • Heart failure, chronic, right-sided (HCC)   • SOB (shortness of breath)   • Palpitations   • CHF (congestive heart failure) (HCC)   • Congestive heart failure (HCC)   • Precordial pain   • Chronic anticoagulation   • Hypokalemia   • Anemia, unspecified type   • Generalized weakness     Past Medical History:   Diagnosis Date   • Anemia    • Arthritis    • CHF (congestive heart failure) ([facility])    • Chronic kidney disease    • Coronary artery disease    • H/O blood clots    • History of transfusion     no reaction    • Hypertension      Past Surgical History:   Procedure Laterality Date   • ABDOMINAL SURGERY     • APPENDECTOMY     • CARDIAC SURGERY     •  SECTION     • COLONOSCOPY N/A 3/7/2018    Procedure: COLONOSCOPY;  Surgeon: Willie Gresham MD;  Location:  [facility];  Service:    • CORONARY ANGIOPLASTY WITH STENT PLACEMENT     • ENDOSCOPY N/A 3/6/2018    Procedure: ESOPHAGOGASTRODUODENOSCOPY;  Surgeon: Willie Gresham MD;  Location:  [facility];  Service:    • PACEMAKER IMPLANTATION      left side    • SKIN BIOPSY     • TUBAL ABDOMINAL LIGATION           OT ASSESSMENT FLOWSHEET (last 12 hours)     OT Evaluation and Treatment     Row Name 10/08/22 1348                   OT Time and Intention    Subjective Information fatigue  -LA        Document Type therapy  note (daily note)  -LA        Patient Effort good  -LA        Symptoms Noted During/After Treatment fatigue  -LA           General Information    Patient Profile Reviewed yes  -LA        General Observations of Patient Patient agreeable to therapy this date. Patient verbalizes fatigue during session due to multiple visitors earlier this date.  -LA           Cognition    Affect/Mental Status (Cognition) WFL  -LA        Orientation Status (Cognition) oriented x 3  -LA        Follows Commands (Cognition) WFL  -LA           Self-Feeding Assessment/Training    [place] Level (Feeding) set up  -LA           Motor Skills    Motor Skills coordination;functional endurance  -LA        Coordination fine motor deficit;minimal impairment  -LA        Functional Endurance poor+  -LA        Therapeutic Exercise shoulder;elbow/forearm;wrist;hand  yellow theraband 10 x2 in all planes  -LA           Plan of Care Review    Plan of Care Reviewed With patient  -LA           Positioning and Restraints    Pre-Treatment Position in bed  -LA        Post Treatment Position bed  -LA        In Bed supine;call light within reach;encouraged to call for assist;exit alarm on  -LA              User Key  (r) = Recorded By, (t) = Taken By, (c) = Cosigned By    Initials Name Effective Dates    Dianna Barreto OT 02/14/22 -                        OT Recommendation and Plan  Planned Therapy Interventions (OT): activity tolerance training, patient/caregiver education/training, adaptive equipment training, ROM/therapeutic exercise, occupation/activity based interventions, BADL retraining, strengthening exercise, transfer/mobility retraining, functional balance retraining  Therapy Frequency (OT): 6 times/wk  Plan of Care Review  Plan of Care Reviewed With: patient  Progress: improving  Outcome Evaluation: OT evaluation completed this date. Patient engaged in UE tasks with focus on trunk control and fx reaching to promote increased independence with  ADLs. Patient has had a significant decline in independence and safety with ADLs resulting in need for skilled OT services. OT to address deficits to allow patient to max level of independence prior to d/c. Patient admitted for swing bed and OT discussed activities. Patient given word search puzzles to promote increase activity. patient also reports she watches television. Family also stated they could bring in books for entertainment as needed.  Plan of Care Reviewed With: patient  Outcome Evaluation: OT evaluation completed this date. Patient engaged in UE tasks with focus on trunk control and fx reaching to promote increased independence with ADLs. Patient has had a significant decline in independence and safety with ADLs resulting in need for skilled OT services. OT to address deficits to allow patient to max level of independence prior to d/c. Patient admitted for swing bed and OT discussed activities. Patient given word search puzzles to promote increase activity. patient also reports she watches television. Family also stated they could bring in books for entertainment as needed.     Outcome Measures     Row Name 10/08/22 1300 10/07/22 1200          How much help from another is currently needed...    Putting on and taking off regular lower body clothing? 1  -LA 1  -LA     Bathing (including washing, rinsing, and drying) 1  -LA 2  -LA     Toileting (which includes using toilet bed pan or urinal) 2  -LA 2  -LA     Putting on and taking off regular upper body clothing 2  -LA 2  -LA     Taking care of personal grooming (such as brushing teeth) 3  -LA 3  -LA     Eating meals 3  -LA 3  -LA     AM-PAC 6 Clicks Score (OT) 12  -LA 13  -LA        Functional Assessment    Outcome Measure Options AM-PAC 6 Clicks Daily Activity (OT)  -LA AM-PAC 6 Clicks Daily Activity (OT)  -LA           User Key  (r) = Recorded By, (t) = Taken By, (c) = Cosigned By    Initials Name Provider Type    Dianna Barreto OT Occupational  Therapist                Time Calculation:    Time Calculation- OT     Row Name 10/08/22 1352             Time Calculation- OT    Total Timed Code Minutes- OT 30 minute(s)  -LA            User Key  (r) = Recorded By, (t) = Taken By, (c) = Cosigned By    Initials Name Provider Type    Dianna Barreto OT Occupational Therapist              Therapy Charges for Today     Code Description Service Date Service Provider Modifiers Qty    35805585690 HC OT THER PROC EA 15 MIN 10/7/2022 Dianna Siddiqi OT GO 1    70005697496 HC OT THERAPEUTIC ACT EA 15 MIN 10/7/2022 Dianna Siddiqi OT GO 2    88857169742 HC OT SELF CARE/MGMT/TRAIN EA 15 MIN 10/8/2022 Dianna Siddiqi OT GO 1    61649481955 HC OT THER PROC EA 15 MIN 10/8/2022 Dianna Siddiqi OT GO 1               Dianna Siddiqi OT  10/8/2022

## 2022-10-08 NOTE — PLAN OF CARE
Goal Outcome Evaluation:              Outcome Evaluation: Patient rested in bed today, alert and oriented, no complaints. Will monitor.

## 2022-10-09 LAB
QT INTERVAL: 548 MS
QTC INTERVAL: 608 MS
TROPONIN T SERPL-MCNC: 0.06 NG/ML (ref 0–0.03)

## 2022-10-09 PROCEDURE — 93005 ELECTROCARDIOGRAM TRACING: CPT | Performed by: INTERNAL MEDICINE

## 2022-10-09 PROCEDURE — 25010000002 HEPARIN (PORCINE) PER 1000 UNITS: Performed by: INTERNAL MEDICINE

## 2022-10-09 PROCEDURE — 94799 UNLISTED PULMONARY SVC/PX: CPT

## 2022-10-09 PROCEDURE — 84484 ASSAY OF TROPONIN QUANT: CPT | Performed by: INTERNAL MEDICINE

## 2022-10-09 PROCEDURE — 94761 N-INVAS EAR/PLS OXIMETRY MLT: CPT

## 2022-10-09 PROCEDURE — 93010 ELECTROCARDIOGRAM REPORT: CPT | Performed by: INTERNAL MEDICINE

## 2022-10-09 RX ORDER — HYDRALAZINE HYDROCHLORIDE 25 MG/1
25 TABLET, FILM COATED ORAL EVERY 8 HOURS SCHEDULED
Status: DISCONTINUED | OUTPATIENT
Start: 2022-10-09 | End: 2022-10-15

## 2022-10-09 RX ADMIN — HEPARIN SODIUM 5000 UNITS: 5000 INJECTION INTRAVENOUS; SUBCUTANEOUS at 08:44

## 2022-10-09 RX ADMIN — Medication 10 ML: at 08:45

## 2022-10-09 RX ADMIN — DOCUSATE SODIUM 100 MG: 100 CAPSULE ORAL at 08:44

## 2022-10-09 RX ADMIN — Medication 10 ML: at 08:44

## 2022-10-09 RX ADMIN — GABAPENTIN 300 MG: 300 CAPSULE ORAL at 21:32

## 2022-10-09 RX ADMIN — Medication 10 MG: at 21:32

## 2022-10-09 RX ADMIN — FUROSEMIDE 40 MG: 40 TABLET ORAL at 08:44

## 2022-10-09 RX ADMIN — HYDRALAZINE HYDROCHLORIDE 50 MG: 50 TABLET, FILM COATED ORAL at 05:15

## 2022-10-09 RX ADMIN — HEPARIN SODIUM 5000 UNITS: 5000 INJECTION INTRAVENOUS; SUBCUTANEOUS at 21:32

## 2022-10-09 RX ADMIN — IPRATROPIUM BROMIDE AND ALBUTEROL SULFATE 3 ML: .5; 2.5 SOLUTION RESPIRATORY (INHALATION) at 07:07

## 2022-10-09 RX ADMIN — ESCITALOPRAM 10 MG: 10 TABLET, FILM COATED ORAL at 08:44

## 2022-10-09 RX ADMIN — FUROSEMIDE 40 MG: 40 TABLET ORAL at 17:01

## 2022-10-09 RX ADMIN — METOPROLOL TARTRATE 12.5 MG: 25 TABLET, FILM COATED ORAL at 08:44

## 2022-10-09 RX ADMIN — PANTOPRAZOLE SODIUM 40 MG: 40 TABLET, DELAYED RELEASE ORAL at 05:15

## 2022-10-09 RX ADMIN — DOCUSATE SODIUM 100 MG: 100 CAPSULE ORAL at 21:33

## 2022-10-09 NOTE — PLAN OF CARE
Goal Outcome Evaluation:              Outcome Evaluation: Pt has rested in bed throughout the shift. Pt straight cathed this shift with 800ml out. Orthostatic blood pressures obtained per order. Pt has no complaints at this time. Will continue plan of care.

## 2022-10-09 NOTE — PLAN OF CARE
Goal Outcome Evaluation:  Plan of Care Reviewed With: patient        Progress: no change  Outcome Evaluation: Pt has rested well in bed overnight. VSS. No acute changes overnight.

## 2022-10-09 NOTE — PROGRESS NOTES
[facility] HOSPITALISTS CROSS COVER NOTE    Patient Identification:  Name:  Tre Kinney  Age:  89 y.o.  Sex:  female  :  1932  MRN:  [id number]  Visit number:  [id number]  Primary Care Provider:  Jessica Pedro APRN    Length of stay in inpatient status:  4    Brief Update     Patient remains in swing bed status.  She was laying in bed and awake when I entered the room.  She is at her baseline mentally.  She states that she does not hurt anywhere.  She told me that physical therapy was letting her have  off and that they would be back Monday.  Otherwise, she denies chest pain, trouble breathing, nausea, vomiting, and diarrhea.  The patient had blood work yesterday which overall appeared to be the same as the last several days of blood work I will obtain blood work in the morning.  Her vital signs are stable; however, her blood pressures are starting to run systolic 100 to systolic 125.  In light of her advanced age, I will order orthostatic vital signs and I will decrease the hydralazine from 50 mg 3 times a day to 25 mg 3 times a day.  We will continue to monitor her blood pressures closely.  If she has an abrupt change in status, then I will immediately obtain blood work and reevaluate the patient.    Jannet Lagunas MD  [facility]  10/09/22  09:25 EDT

## 2022-10-10 LAB
ANION GAP SERPL CALCULATED.3IONS-SCNC: 13.5 MMOL/L (ref 5–15)
BUN SERPL-MCNC: 39 MG/DL (ref 8–23)
BUN/CREAT SERPL: 16.3 (ref 7–25)
CALCIUM SPEC-SCNC: 9 MG/DL (ref 8.6–10.5)
CHLORIDE SERPL-SCNC: 99 MMOL/L (ref 98–107)
CO2 SERPL-SCNC: 25.5 MMOL/L (ref 22–29)
CREAT SERPL-MCNC: 2.4 MG/DL (ref 0.57–1)
DEPRECATED RDW RBC AUTO: 68.1 FL (ref 37–54)
EGFRCR SERPLBLD CKD-EPI 2021: 18.9 ML/MIN/1.73
ERYTHROCYTE [DISTWIDTH] IN BLOOD BY AUTOMATED COUNT: 19.6 % (ref 12.3–15.4)
GLUCOSE SERPL-MCNC: 116 MG/DL (ref 65–99)
HCT VFR BLD AUTO: 31.6 % (ref 34–46.6)
HGB BLD-MCNC: 10.3 G/DL (ref 12–15.9)
MAGNESIUM SERPL-MCNC: 1.9 MG/DL (ref 1.6–2.4)
MCH RBC QN AUTO: 30.7 PG (ref 26.6–33)
MCHC RBC AUTO-ENTMCNC: 32.6 G/DL (ref 31.5–35.7)
MCV RBC AUTO: 94 FL (ref 79–97)
PHOSPHATE SERPL-MCNC: 3.2 MG/DL (ref 2.5–4.5)
PLATELET # BLD AUTO: 201 10*3/MM3 (ref 140–450)
PMV BLD AUTO: 10.2 FL (ref 6–12)
POTASSIUM SERPL-SCNC: 3.3 MMOL/L (ref 3.5–5.2)
QT INTERVAL: 508 MS
QTC INTERVAL: 567 MS
RBC # BLD AUTO: 3.36 10*6/MM3 (ref 3.77–5.28)
SODIUM SERPL-SCNC: 138 MMOL/L (ref 136–145)
TROPONIN T SERPL-MCNC: 0.06 NG/ML (ref 0–0.03)
WBC NRBC COR # BLD: 6.7 10*3/MM3 (ref 3.4–10.8)

## 2022-10-10 PROCEDURE — 80048 BASIC METABOLIC PNL TOTAL CA: CPT | Performed by: INTERNAL MEDICINE

## 2022-10-10 PROCEDURE — 97116 GAIT TRAINING THERAPY: CPT

## 2022-10-10 PROCEDURE — 93010 ELECTROCARDIOGRAM REPORT: CPT | Performed by: INTERNAL MEDICINE

## 2022-10-10 PROCEDURE — 25010000002 MAGNESIUM SULFATE 2 GM/50ML SOLUTION: Performed by: INTERNAL MEDICINE

## 2022-10-10 PROCEDURE — 97110 THERAPEUTIC EXERCISES: CPT

## 2022-10-10 PROCEDURE — 85027 COMPLETE CBC AUTOMATED: CPT | Performed by: INTERNAL MEDICINE

## 2022-10-10 PROCEDURE — 84100 ASSAY OF PHOSPHORUS: CPT | Performed by: INTERNAL MEDICINE

## 2022-10-10 PROCEDURE — 93005 ELECTROCARDIOGRAM TRACING: CPT | Performed by: INTERNAL MEDICINE

## 2022-10-10 PROCEDURE — 83735 ASSAY OF MAGNESIUM: CPT | Performed by: INTERNAL MEDICINE

## 2022-10-10 PROCEDURE — 94799 UNLISTED PULMONARY SVC/PX: CPT

## 2022-10-10 PROCEDURE — 97530 THERAPEUTIC ACTIVITIES: CPT

## 2022-10-10 PROCEDURE — 94762 N-INVAS EAR/PLS OXIMTRY CONT: CPT

## 2022-10-10 PROCEDURE — 84484 ASSAY OF TROPONIN QUANT: CPT | Performed by: INTERNAL MEDICINE

## 2022-10-10 PROCEDURE — 25010000002 HEPARIN (PORCINE) PER 1000 UNITS: Performed by: INTERNAL MEDICINE

## 2022-10-10 PROCEDURE — 99308 SBSQ NF CARE LOW MDM 20: CPT | Performed by: INTERNAL MEDICINE

## 2022-10-10 PROCEDURE — 94761 N-INVAS EAR/PLS OXIMETRY MLT: CPT

## 2022-10-10 RX ORDER — MAGNESIUM SULFATE HEPTAHYDRATE 40 MG/ML
2 INJECTION, SOLUTION INTRAVENOUS ONCE
Status: COMPLETED | OUTPATIENT
Start: 2022-10-10 | End: 2022-10-10

## 2022-10-10 RX ORDER — POTASSIUM CHLORIDE 20 MEQ/1
40 TABLET, EXTENDED RELEASE ORAL ONCE
Status: COMPLETED | OUTPATIENT
Start: 2022-10-10 | End: 2022-10-10

## 2022-10-10 RX ADMIN — Medication 10 ML: at 08:24

## 2022-10-10 RX ADMIN — POTASSIUM CHLORIDE 40 MEQ: 20 TABLET, EXTENDED RELEASE ORAL at 09:02

## 2022-10-10 RX ADMIN — FUROSEMIDE 40 MG: 40 TABLET ORAL at 08:24

## 2022-10-10 RX ADMIN — HEPARIN SODIUM 5000 UNITS: 5000 INJECTION INTRAVENOUS; SUBCUTANEOUS at 08:24

## 2022-10-10 RX ADMIN — HYDROCODONE BITARTRATE AND ACETAMINOPHEN 1 TABLET: 7.5; 325 TABLET ORAL at 01:21

## 2022-10-10 RX ADMIN — METOPROLOL TARTRATE 12.5 MG: 25 TABLET, FILM COATED ORAL at 20:41

## 2022-10-10 RX ADMIN — FUROSEMIDE 40 MG: 40 TABLET ORAL at 17:00

## 2022-10-10 RX ADMIN — Medication 10 ML: at 20:41

## 2022-10-10 RX ADMIN — ESCITALOPRAM 10 MG: 10 TABLET, FILM COATED ORAL at 08:24

## 2022-10-10 RX ADMIN — IPRATROPIUM BROMIDE AND ALBUTEROL SULFATE 3 ML: .5; 2.5 SOLUTION RESPIRATORY (INHALATION) at 07:45

## 2022-10-10 RX ADMIN — GABAPENTIN 300 MG: 300 CAPSULE ORAL at 20:41

## 2022-10-10 RX ADMIN — PANTOPRAZOLE SODIUM 40 MG: 40 TABLET, DELAYED RELEASE ORAL at 04:43

## 2022-10-10 RX ADMIN — METOPROLOL TARTRATE 12.5 MG: 25 TABLET, FILM COATED ORAL at 08:24

## 2022-10-10 RX ADMIN — HEPARIN SODIUM 5000 UNITS: 5000 INJECTION INTRAVENOUS; SUBCUTANEOUS at 20:41

## 2022-10-10 RX ADMIN — Medication 10 MG: at 20:41

## 2022-10-10 RX ADMIN — HYDRALAZINE HYDROCHLORIDE 25 MG: 25 TABLET, FILM COATED ORAL at 22:02

## 2022-10-10 RX ADMIN — DOCUSATE SODIUM 100 MG: 100 CAPSULE ORAL at 20:41

## 2022-10-10 RX ADMIN — HYDRALAZINE HYDROCHLORIDE 25 MG: 25 TABLET, FILM COATED ORAL at 14:34

## 2022-10-10 RX ADMIN — DOCUSATE SODIUM 100 MG: 100 CAPSULE ORAL at 08:24

## 2022-10-10 RX ADMIN — MAGNESIUM SULFATE HEPTAHYDRATE 2 G: 40 INJECTION, SOLUTION INTRAVENOUS at 09:02

## 2022-10-10 NOTE — THERAPY TREATMENT NOTE
Patient Name: Tre Kinney  : 1932    MRN: [id number]                              Today's Date: 10/10/2022       Admit Date: 10/5/2022    Visit Dx: No diagnosis found.  Patient Active Problem List   Diagnosis   • Acid reflux   • Ankle arthralgia   • Cardiac conduction disorder   • Closed fracture of distal fibula   • Arteriosclerosis of coronary artery   • Fracture of distal end of tibia   • Elevated cholesterol   • BP (high blood pressure)   • Hypertrophic polyarthritis   • OP (osteoporosis)   • Right heart failure (HCC)   • Heart failure, chronic, right-sided (HCC)   • SOB (shortness of breath)   • Palpitations   • CHF (congestive heart failure) (HCC)   • Congestive heart failure (HCC)   • Precordial pain   • Chronic anticoagulation   • Hypokalemia   • Anemia, unspecified type   • Generalized weakness     Past Medical History:   Diagnosis Date   • Anemia    • Arthritis    • CHF (congestive heart failure) (HCC)    • Chronic kidney disease    • Coronary artery disease    • H/O blood clots    • History of transfusion     no reaction    • Hypertension      Past Surgical History:   Procedure Laterality Date   • ABDOMINAL SURGERY     • APPENDECTOMY     • CARDIAC SURGERY     •  SECTION     • COLONOSCOPY N/A 3/7/2018    Procedure: COLONOSCOPY;  Surgeon: Willie Gresham MD;  Location:  [facility];  Service:    • CORONARY ANGIOPLASTY WITH STENT PLACEMENT     • ENDOSCOPY N/A 3/6/2018    Procedure: ESOPHAGOGASTRODUODENOSCOPY;  Surgeon: Willie Gresham MD;  Location:  [facility];  Service:    • PACEMAKER IMPLANTATION      left side    • SKIN BIOPSY     • TUBAL ABDOMINAL LIGATION        General Information     Row Name 10/10/22 1327          OT Time and Intention    Document Type therapy note (daily note)  -     Mode of Treatment individual therapy;occupational therapy  -     Row Name 10/10/22 1327          General Information    Patient Profile Reviewed yes  -     Existing  Precautions/Restrictions fall;TLSO  -KP     Row Name 10/10/22 1327          Cognition    Orientation Status (Cognition) oriented x 3  -KP     Row Name 10/10/22 1327          Safety Issues, Functional Mobility    Impairments Affecting Function (Mobility) balance;strength;endurance/activity tolerance  -           User Key  (r) = Recorded By, (t) = Taken By, (c) = Cosigned By    Initials Name Provider Type    Linn Richardson OT Occupational Therapist                 Mobility/ADL's    No documentation.                Obj/Interventions     Row Name 10/10/22 1328          Motor Skills    Motor Skills functional endurance  -     Functional Endurance fair  -     Therapeutic Exercise --  BUE AROM exercise through functional movement patterns X15 reps X2 sets; pull to long sit X10 reps (reported dizziness with postural change)  -           User Key  (r) = Recorded By, (t) = Taken By, (c) = Cosigned By    Initials Name Provider Type    Lnin Richardson OT Occupational Therapist               Goals/Plan    No documentation.                Clinical Impression     Row Name 10/10/22 1329          Pain Assessment    Pretreatment Pain Rating 0/10 - no pain  -     Posttreatment Pain Rating 0/10 - no pain  -     Row Name 10/10/22 1329          Plan of Care Review    Plan of Care Reviewed With patient  -     Progress improving  -     Outcome Evaluation Patient seen for OT treatment. Fair activity tolerance/functional endurance for seated UE exercises. Continue plan of care.  -           User Key  (r) = Recorded By, (t) = Taken By, (c) = Cosigned By    Initials Name Provider Type    Linn Richardson OT Occupational Therapist               Outcome Measures     Row Name 10/10/22 1329          How much help from another is currently needed...    Putting on and taking off regular lower body clothing? 1  -KP     Bathing (including washing, rinsing, and drying) 2  -KP     Toileting (which includes using toilet bed pan  or urinal) 2  -KP     Putting on and taking off regular upper body clothing 2  -KP     Taking care of personal grooming (such as brushing teeth) 3  -KP     Eating meals 3  -KP     AM-PAC 6 Clicks Score (OT) 13  -KP     Row Name 10/10/22 0824          How much help from another person do you currently need...    Turning from your back to your side while in flat bed without using bedrails? 3  -VL     Moving from lying on back to sitting on the side of a flat bed without bedrails? 2  -VL     Moving to and from a bed to a chair (including a wheelchair)? 1  -VL     Standing up from a chair using your arms (e.g., wheelchair, bedside chair)? 1  -VL     Climbing 3-5 steps with a railing? 1  -VL     To walk in hospital room? 1  -VL     AM-PAC 6 Clicks Score (PT) 9  -VL     Highest level of mobility 3 --> Sat at edge of bed  -VL     Row Name 10/10/22 1329          Functional Assessment    Outcome Measure Options AM-PAC 6 Clicks Daily Activity (OT)  -KP           User Key  (r) = Recorded By, (t) = Taken By, (c) = Cosigned By    Initials Name Provider Type    Linn Richardson OT Occupational Therapist    VL Susan Guzmán, RN Registered Nurse                  OT Recommendation and Plan     Plan of Care Review  Plan of Care Reviewed With: patient  Progress: improving  Outcome Evaluation: Patient seen for OT treatment. Fair activity tolerance/functional endurance for seated UE exercises. Continue plan of care.     Time Calculation:    Time Calculation- OT     Row Name 10/10/22 1330             Time Calculation- OT    OT Received On 10/10/22  -         Timed Charges    86413 - OT Therapeutic Exercise Minutes 23  -KP         Total Minutes    Timed Charges Total Minutes 23  -KP       Total Minutes 23  -KP            User Key  (r) = Recorded By, (t) = Taken By, (c) = Cosigned By    Initials Name Provider Type    Linn Richardson OT Occupational Therapist              Therapy Charges for Today     Code Description Service Date  Service Provider Modifiers Qty    31616629380 HC OT THER PROC EA 15 MIN 10/10/2022 Linn Quevedo, VELASQUEZ GO 2               Linn Quevedo OT  10/10/2022

## 2022-10-10 NOTE — THERAPY TREATMENT NOTE
Acute Care - Physical Therapy Treatment Note   [facility]     Patient Name: Tre Kinney  : 1932  MRN: [id number]  Today's Date: 10/10/2022   Onset of Illness/Injury or Date of Surgery: 10/06/22  Visit Dx:   No diagnosis found.  Patient Active Problem List   Diagnosis   • Acid reflux   • Ankle arthralgia   • Cardiac conduction disorder   • Closed fracture of distal fibula   • Arteriosclerosis of coronary artery   • Fracture of distal end of tibia   • Elevated cholesterol   • BP (high blood pressure)   • Hypertrophic polyarthritis   • OP (osteoporosis)   • Right heart failure (HCC)   • Heart failure, chronic, right-sided (HCC)   • SOB (shortness of breath)   • Palpitations   • CHF (congestive heart failure) (HCC)   • Congestive heart failure (HCC)   • Precordial pain   • Chronic anticoagulation   • Hypokalemia   • Anemia, unspecified type   • Generalized weakness     Past Medical History:   Diagnosis Date   • Anemia    • Arthritis    • CHF (congestive heart failure) (HCC)    • Chronic kidney disease    • Coronary artery disease    • H/O blood clots    • History of transfusion     no reaction    • Hypertension      Past Surgical History:   Procedure Laterality Date   • ABDOMINAL SURGERY     • APPENDECTOMY     • CARDIAC SURGERY     •  SECTION     • COLONOSCOPY N/A 3/7/2018    Procedure: COLONOSCOPY;  Surgeon: Willie Gresham MD;  Location:  [facility];  Service:    • CORONARY ANGIOPLASTY WITH STENT PLACEMENT     • ENDOSCOPY N/A 3/6/2018    Procedure: ESOPHAGOGASTRODUODENOSCOPY;  Surgeon: Willie Gresham MD;  Location:  [facility];  Service:    • PACEMAKER IMPLANTATION      left side    • SKIN BIOPSY     • TUBAL ABDOMINAL LIGATION       PT Assessment (last 12 hours)     PT Evaluation and Treatment     Row Name 10/10/22 1520          Physical Therapy Time and Intention    Subjective Information complains of;weakness;fatigue  -CT     Document Type therapy note (daily note)  -CT     Mode of  Treatment physical therapy  -CT     Patient Effort adequate  -CT     Comment Attempted to transfer pt to chair today and after 2 steps pts knees buckled and she was manually placed in chair by staff. Pt was dep x 2 for transfer. Pt sat up aprx 2 hour and was pt back to bed by nursing staff.  -CT     Row Name 10/10/22 1520          General Information    Patient Profile Reviewed yes  -CT     Existing Precautions/Restrictions fall;TLSO  pt continues to decline TLSO use  -CT     Limitations/Impairments safety/cognitive  -CT     Row Name 10/10/22 1520          Home Use of Assistive/Adaptive Equipment    Equipment Currently Used at Home wheelchair;commode;walker, rolling;oxygen  -CT     Row Name 10/10/22 1520          Cognition    Affect/Mental Status (Cognition) WFL  -CT     Orientation Status (Cognition) oriented x 3  -CT     Follows Commands (Cognition) WFL  -CT     Row Name 10/10/22 1520          Bed Mobility    Bed Mobility sit-supine;supine-sit;scooting/bridging;rolling left;rolling right  -CT     Rolling Left [place] (Bed Mobility) minimum assist (75% patient effort)  -CT     Rolling Right [place] (Bed Mobility) minimum assist (75% patient effort)  -CT     Scooting/Bridging [place] (Bed Mobility) minimum assist (75% patient effort)  -CT     Supine-Sit [place] (Bed Mobility) minimum assist (75% patient effort)  -CT     Sit-Supine [place] (Bed Mobility) moderate assist (50% patient effort);2 person assist;verbal cues;nonverbal cues (demo/gesture)  -CT     Bed Mobility, Safety Issues decreased use of arms for pushing/pulling;decreased use of legs for bridging/pushing;impaired trunk control for bed mobility  -CT     Assistive Device (Bed Mobility) bed rails;draw sheet  -CT     Row Name 10/10/22 1520          Transfers    Transfers bed-chair transfer;chair-bed transfer  -CT     Row Name 10/10/22 1520          Bed-Chair Transfer    Bed-Chair [place] (Transfers) dependent (less than 25%  patient effort);2 person assist  -CT     Assistive Device (Bed-Chair Transfers) walker, front-wheeled  -CT     Row Name 10/10/22 1520          Chair-Bed Transfer    Chair-Bed [place] (Transfers) dependent (less than 25% patient effort);2 person assist  -CT     Assistive Device (Chair-Bed Transfers) walker, front-wheeled  -CT     Row Name 10/10/22 1520          Sit-Stand Transfer    Sit-Stand [place] (Transfers) moderate assist (50% patient effort);2 person assist;verbal cues  -CT     Assistive Device (Sit-Stand Transfers) walker, front-wheeled  -CT     Row Name 10/10/22 1520          Stand-Sit Transfer    Stand-Sit [place] (Transfers) moderate assist (50% patient effort);2 person assist;verbal cues  -CT     Assistive Device (Stand-Sit Transfers) walker, front-wheeled  -CT     Row Name 10/10/22 1520          Gait/Stairs (Locomotion)    [place] Level (Gait) maximum assist (25% patient effort);2 person assist;verbal cues;nonverbal cues (demo/gesture)  -CT     Assistive Device (Gait) walker, front-wheeled  -CT     Distance in Feet (Gait) 2  -CT     Pattern (Gait) step-to  -CT     Deviations/Abnormal Patterns (Gait) gait speed decreased;festinating/shuffling;weight shifting decreased  -CT     Bilateral Gait Deviations weight shift ability decreased;forward flexed posture  -CT     Row Name 10/10/22 1520          Plan of Care Review    Plan of Care Reviewed With patient  -CT     Row Name 10/10/22 1520          Positioning and Restraints    Pre-Treatment Position in bed  -CT     Post Treatment Position chair  -CT     In Chair sitting;call light within reach;encouraged to call for assist  -CT     Row Name 10/10/22 1520          Therapy Assessment/Plan (PT)    Rehab Potential (PT) fair, will monitor progress closely  -CT     Criteria for Skilled Interventions Met (PT) yes;meets criteria  -CT     Therapy Frequency (PT) 5 times/wk  5-6 times/wk  -CT           User Key  (r) = Recorded By, (t) = Taken By, (c)  = Cosigned By    Initials Name Provider Type    CT Bhargavi Vásquez PT Physical Therapist                  PT Recommendation and Plan  Anticipated Discharge Disposition (PT): home with /7 care, home with home health  Planned Therapy Interventions (PT): balance training, bed mobility training, gait training, home exercise program, manual therapy techniques, motor coordination training, neuromuscular re-education, patient/family education, postural re-education, strengthening, transfer training  Therapy Frequency (PT): 5 times/wk (5-6 times/wk)  Plan of Care Reviewed With: patient   Outcome Measures     Row Name 10/08/22 1300             How much help from another is currently needed...    Putting on and taking off regular lower body clothing? 1  -LA      Bathing (including washing, rinsing, and drying) 1  -LA      Toileting (which includes using toilet bed pan or urinal) 2  -LA      Putting on and taking off regular upper body clothing 2  -LA      Taking care of personal grooming (such as brushing teeth) 3  -LA      Eating meals 3  -LA      AM-PAC 6 Clicks Score (OT) 12  -LA         Functional Assessment    Outcome Measure Options AM-PAC 6 Clicks Daily Activity (OT)  -LA            User Key  (r) = Recorded By, (t) = Taken By, (c) = Cosigned By    Initials Name Provider Type    Dianna Barreto OT Occupational Therapist                 Time Calculation:    PT Charges     Row Name 10/10/22 1523             Time Calculation    PT Received On 10/10/22  -CT         Time Calculation- PT    Total Timed Code Minutes- PT 40 minute(s)  -CT            User Key  (r) = Recorded By, (t) = Taken By, (c) = Cosigned By    Initials Name Provider Type    CT Bhargavi Vásquez PT Physical Therapist              Therapy Charges for Today     Code Description Service Date Service Provider Modifiers Qty    33255142027  GAIT TRAINING EA 15 MIN 10/10/2022 Bhargavi Vásquez PT GP 1    [id number]  PT THERAPEUTIC ACT EA 15 MIN 10/10/2022  Bhargavi Vásquez, PT GP 2          PT G-Codes  Outcome Measure Options: AM-PAC 6 Clicks Daily Activity (OT)  AM-PAC 6 Clicks Score (PT): 9  AM-PAC 6 Clicks Score (OT): 13    Bhargavi Vásquez, PT  10/10/2022

## 2022-10-10 NOTE — PLAN OF CARE
Goal Outcome Evaluation:              Outcome Evaluation: Pt is resting in bed at this time. Pt worked with PT this shift an dsat up in chair. Pt has has continued urinary retention and refused further straight caths. DO aware, fley anchored per order. Magnesium and potassium replaced per order. Will continue plan of care.

## 2022-10-10 NOTE — PLAN OF CARE
Goal Outcome Evaluation:  Plan of Care Reviewed With: patient        Progress: no change  Outcome Evaluation: Pt resting in bed during assessment. No complaints at this time. Vital signs stable, no acute changes at this time. Will continue to monitor.

## 2022-10-10 NOTE — PROGRESS NOTES
[facility] HOSPITALIST PROGRESS NOTE    Subjective     History:   Tre Kinney is a 89 y.o. female admitted on 10/5/2022 secondary to <principal problem not specified>     Procedures: None    CC: Follow up debility    Patient seen and examined with SONAM Kang. Awake and alert. Continues to report generalized weakness. Reports some pain/discomfort related to urinary retention. Dyspnea appears overall improved. No reported CP. No acute events overnight per RN.     History taken from: patient, chart, and RN.      Objective     Vital Signs  Temp:  [98 °F (36.7 °C)-98.1 °F (36.7 °C)] 98.1 °F (36.7 °C)  Heart Rate:  [68-89] 77  Resp:  [16-18] 16  BP: (103-153)/(55-78) 153/78    Intake/Output Summary (Last 24 hours) at 10/10/2022 1433  Last data filed at 10/10/2022 0410  Gross per 24 hour   Intake 360 ml   Output 1200 ml   Net -840 ml         Physical Exam:  General:    Awake, alert, in no acute distress, chronically ill appearing   Heart:      Normal S1 and S2. Regular rate and rhythm. No significant murmur, rubs or gallops appreciated.   Lungs:     Respirations regular, even and unlabored. Lungs clear to auscultation B/L. No wheezes, rales or rhonchi.   Abdomen:   Soft and nontender. No guarding, rebound tenderness or  organomegaly noted. Bowel sounds present x 4.   Extremities:  Trace bilateral lower extremity edema. Moves UE and LE equally B/L.     Results Review:    Results from last 7 days   Lab Units 10/10/22  0246 10/07/22  0047 10/05/22  0311 10/04/22  0116   WBC 10*3/mm3 6.70 7.41 6.68 5.82   HEMOGLOBIN g/dL 10.3* 10.4* 9.5* 8.9*   PLATELETS 10*3/mm3 201 250 247 250     Results from last 7 days   Lab Units 10/10/22  0246 10/07/22  0047 10/05/22  0311 10/04/22  0116   SODIUM mmol/L 138 136 137 139   POTASSIUM mmol/L 3.3* 3.9 3.7 3.6   CHLORIDE mmol/L 99 97* 98 102   CO2 mmol/L 25.5 19.3* 24.1 25.0   BUN mg/dL 39* 31* 21 23   CREATININE mg/dL 2.40* 2.17* 1.90* 1.92*   CALCIUM mg/dL 9.0 9.4 9.3 9.1    GLUCOSE mg/dL 116* 91 101* 107*     Results from last 7 days   Lab Units 10/05/22  0311 10/04/22  0116   BILIRUBIN mg/dL 0.8 0.6   ALK PHOS U/L 66 64   AST (SGOT) U/L 11 13   ALT (SGPT) U/L 6 7     Results from last 7 days   Lab Units 10/10/22  0246 10/07/22  0047 10/05/22  0311 10/04/22  0116   MAGNESIUM mg/dL 1.9 2.0 2.0 2.0         Results from last 7 days   Lab Units 10/10/22  0246 10/09/22  1329   TROPONIN T ng/mL 0.058* 0.063*       Imaging Results (Last 24 Hours)     ** No results found for the last 24 hours. **            Medications:  docusate sodium, 100 mg, Oral, BID  escitalopram, 10 mg, Oral, Daily  furosemide, 40 mg, Oral, BID  gabapentin, 300 mg, Oral, Nightly  heparin (porcine), 5,000 Units, Subcutaneous, Q12H  hydrALAZINE, 25 mg, Oral, Q8H  melatonin, 10 mg, Oral, Nightly  metoprolol tartrate, 12.5 mg, Oral, Q12H  pantoprazole, 40 mg, Oral, Q AM  sodium chloride, 10 mL, Intravenous, Q12H  sodium chloride, 10 mL, Intravenous, Q12H  [START ON 10/19/2022] tuberculin, 5 Units, Intradermal, Once               Assessment & Plan   Generalized weakness/debility  DIANA on CKD IV  Acute blood loss anemia  Traumatic right gluteal hematoma  Paroxysmal Afib  Hx of SSS s/p pacemaker placement  Essential HTN  Reported hx of DVT's   Anxiety/depression  Advanced age    Plan  Antihypertensives previously reduced to avoid relative hypotension with BP fluctuating today. Creatine has risen today with fluctuations in BP possibly contributing. However, if creatine continues to rise will need to adjust diuretic. Pt continues to have urinary retention and now refusing additional straight cath's. Rodriguez placed today. Consulted palliative care for additional support as pt continues to report she feels like giving up at times. Cont PT/OT as tolerated.       Disposition: Likely D/C home with family upon completion of therapy.     Mustapha Loredo DO  10/10/22  14:33 EDT

## 2022-10-10 NOTE — CONSULTS
Palliative Care Initial Consult     Attending Physician: Mustapha Loredo, *  Referring Provider: Mustapha Loredo    Palliative care reason for consult:GOC/ACP support for pt and family.  Code Status:   Code Status and Medical Interventions:   Ordered at: 10/05/22 1253     Medical Intervention Limits:    NO intubation (DNI)     Level Of Support Discussed With:    Patient    Next of Kin (If No Surrogate)     Code Status (Patient has no pulse and is not breathing):    No CPR (Do Not Attempt to Resuscitate)     Medical Interventions (Patient has pulse or is breathing):    Limited Support     Comments:    Patient states that she does not want to be resuscitated or put on a ventilator, her son Leno supports her decision      Advanced Directives: Advance Directive Status: Patient does not have advance directive   Healthcare surrogate: RUSSEL three adult sons, Valdez, Leno and Willian.  Goals of Care: Tre confirmed that she does not want to be resuscitated or to be put on ventilator, and would like to return home with son Valdez when she is stronger, she is currently swing bed/working with PT.    HPI:  Tre Kinney is a 89 y.o. female admitted on 10/5/2022 to swing bed for physical therapy for ten days. She was originally admitted to the hospital on 9/19/2022 due to a fall and symptomatic anemia. She has a medical history CHF, chronic kidney disease, coronary artery disease s/p stenting, history of blood clots, chronic anticoagulation with Coumadin, SSS s/p pacemaker placement, chronic diastolic CHF, and iron deficiency anemia who presents today with a chief complaint of a fall. Per pt she has become progressively more weak and has experienced several falls. Pt has sustained a moderate acute superior endplate vertebral compression fracture at the L1 with about 40% loss of vertebral body height and mild posterior osseous retropulsion of about 6 mm and also noted to have chronic compression fracture deformities  involving the inferior endplates of L3 and L4 due to her first fall and from her second fall she does have a large hematoma on her right side, which led her to this admission. Pt had a hemoglobin of 6.1 for which she received 2 units of PRBC's. Pt denies any known source of bleeding and does say she had a scope(EGD colonoscopy at [facility] in 2018.        ROS: Negative except as above in HPI.     Past Medical History:   Diagnosis Date   • Anemia    • Arthritis    • CHF (congestive heart failure) (HCC)    • Chronic kidney disease    • Coronary artery disease    • H/O blood clots    • History of transfusion     no reaction    • Hypertension      Past Surgical History:   Procedure Laterality Date   • ABDOMINAL SURGERY     • APPENDECTOMY     • CARDIAC SURGERY     •  SECTION     • COLONOSCOPY N/A 3/7/2018    Procedure: COLONOSCOPY;  Surgeon: Willie Gresham MD;  Location:  [facility];  Service:    • CORONARY ANGIOPLASTY WITH STENT PLACEMENT     • ENDOSCOPY N/A 3/6/2018    Procedure: ESOPHAGOGASTRODUODENOSCOPY;  Surgeon: Willie Gresham MD;  Location:  [facility];  Service:    • PACEMAKER IMPLANTATION      left side    • SKIN BIOPSY     • TUBAL ABDOMINAL LIGATION       Social History     Socioeconomic History   • Marital status:    Tobacco Use   • Smoking status: Never   Substance and Sexual Activity   • Alcohol use: No   • Drug use: No   • Sexual activity: Defer     Family History   Problem Relation Age of Onset   • Other Mother         cardiac disorder   • Hypertension Mother    • Heart disease Mother    • Thyroid disease Mother    • Other Father         cardiac disorder   • Heart disease Father    • Hypertension Father    • Other Sister         cardiac disorder   • Other Brother         cardiac disorder   • Other Other         cardiac disorder   • Other Other         cardiac disorder       Allergies   Allergen Reactions   • Adhesive Tape Dermatitis   • Xanax [Alprazolam] Unknown - Low Severity        Current Facility-Administered Medications   Medication Dose Route Frequency Provider Last Rate Last Admin   • docusate sodium (COLACE) capsule 100 mg  100 mg Oral BID Jannet Lagunas MD   100 mg at 10/10/22 0824   • escitalopram (LEXAPRO) tablet 10 mg  10 mg Oral Daily Jannet Lagunas MD   10 mg at 10/10/22 0824   • furosemide (LASIX) tablet 40 mg  40 mg Oral BID Jannet Lagunas MD   40 mg at 10/10/22 0824   • gabapentin (NEURONTIN) capsule 300 mg  300 mg Oral Nightly Jannet Lagunas MD   300 mg at 10/09/22 2132   • heparin (porcine) 5000 UNIT/ML injection 5,000 Units  5,000 Units Subcutaneous Q12H Jannet Lagunas MD   5,000 Units at 10/10/22 0824   • hydrALAZINE (APRESOLINE) tablet 25 mg  25 mg Oral Q8H Jannet Lagunas MD       • HYDROcodone-acetaminophen (NORCO) 7.5-325 MG per tablet 1 tablet  1 tablet Oral Q8H PRN Jannet Lagunas MD   1 tablet at 10/10/22 0121   • ipratropium-albuterol (DUO-NEB) nebulizer solution 3 mL  3 mL Nebulization Q6H PRN Jannet Lagunas MD   3 mL at 10/10/22 0745   • melatonin tablet 10 mg  10 mg Oral Nightly Jannet Lagunas MD   10 mg at 10/09/22 2132   • metoprolol tartrate (LOPRESSOR) tablet 12.5 mg  12.5 mg Oral Q12H Jannet Lagunas MD   12.5 mg at 10/10/22 0824   • ondansetron (ZOFRAN) injection 4 mg  4 mg Intravenous Q6H PRN Jannet Lagunas MD       • pantoprazole (PROTONIX) EC tablet 40 mg  40 mg Oral Q AM Jannet Lagunas MD   40 mg at 10/10/22 0443   • phenol (CHLORASEPTIC) 1.4 % liquid 1 spray  1 spray Mouth/Throat Q2H PRN Jannet Lagunas MD       • polyethylene glycol (MIRALAX) packet 17 g  17 g Oral BID PRN Jannet Lagunas MD       • prochlorperazine (COMPAZINE) injection 5 mg  5 mg Intravenous Q6H PRN Jannet Lagunas MD        Or   • prochlorperazine (COMPAZINE) tablet 5 mg  5 mg Oral Q6H PRN Jannet Lagunas MD        Or   • prochlorperazine (COMPAZINE) suppository 25 mg  25 mg Rectal Q12H PRN Jannet Lagunas MD       •  "promethazine (PHENERGAN) 6.25 mg in sodium chloride 0.9 % 50 mL  6.25 mg Intravenous Q6H PRN Jannet Lagunas MD       • sodium chloride 0.9 % flush 10 mL  10 mL Intravenous PRN Jannet Lagunas MD       • sodium chloride 0.9 % flush 10 mL  10 mL Intravenous Q12H Jannet Lagunas MD   10 mL at 10/10/22 0824   • sodium chloride 0.9 % flush 10 mL  10 mL Intravenous PRN Jannet Lagunas MD       • sodium chloride 0.9 % flush 10 mL  10 mL Intravenous Q12H Jannet Lagunas MD   10 mL at 10/10/22 0824   • sodium chloride 0.9 % flush 10 mL  10 mL Intravenous PRN Jannet Lagunas MD       • [START ON 10/19/2022] tuberculin injection 5 Units  5 Units Intradermal Once Jannet Lagunas MD            •  HYDROcodone-acetaminophen  •  ipratropium-albuterol  •  ondansetron  •  phenol  •  polyethylene glycol  •  prochlorperazine **OR** prochlorperazine **OR** prochlorperazine  •  promethazine  •  sodium chloride  •  sodium chloride  •  sodium chloride    Current medication reviewed for route, type, dose and frequency and are current per MAR.    Palliative Performance Scale Score:     /78 (BP Location: Left arm, Patient Position: Lying)   Pulse 77   Temp 98.1 °F (36.7 °C) (Oral)   Resp 16   Ht 170.2 cm (67.01\")   Wt 71.8 kg (158 lb 3.2 oz)   SpO2 95%   BMI 24.77 kg/m²     Intake/Output Summary (Last 24 hours) at 10/10/2022 1405  Last data filed at 10/10/2022 0410  Gross per 24 hour   Intake 360 ml   Output 1200 ml   Net -840 ml       PE:  General Appearance:    Pt appears weak, alert, cooperative, NAD   HEENT:    NC/AT, without obvious abnormality, EOMI, anicteric, nasal congestion present   Neck:   supple, trachea midline, no JVD   Lungs:     Regular even respirations, CTA, on room air, no distress    Heart:    RRR, normal S1 and S2, no M/R/G   Abdomen:     Soft, NT, ND, NABS    Extremities:   Moves all extremities, no edema   Pulses:   Pulses palpable and equal bilaterally   Skin:   Warm, dry "   Neurologic:   A/Ox3, cooperative, weakness   Psych:   Calm, appropriate       Labs:   Results from last 7 days   Lab Units 10/10/22  0246   WBC 10*3/mm3 6.70   HEMOGLOBIN g/dL 10.3*   HEMATOCRIT % 31.6*   PLATELETS 10*3/mm3 201     Results from last 7 days   Lab Units 10/10/22  0246   SODIUM mmol/L 138   POTASSIUM mmol/L 3.3*   CHLORIDE mmol/L 99   CO2 mmol/L 25.5   BUN mg/dL 39*   CREATININE mg/dL 2.40*   GLUCOSE mg/dL 116*   CALCIUM mg/dL 9.0     Results from last 7 days   Lab Units 10/10/22  0246 10/07/22  0047 10/05/22  0311   SODIUM mmol/L 138   < > 137   POTASSIUM mmol/L 3.3*   < > 3.7   CHLORIDE mmol/L 99   < > 98   CO2 mmol/L 25.5   < > 24.1   BUN mg/dL 39*   < > 21   CREATININE mg/dL 2.40*   < > 1.90*   CALCIUM mg/dL 9.0   < > 9.3   BILIRUBIN mg/dL  --   --  0.8   ALK PHOS U/L  --   --  66   ALT (SGPT) U/L  --   --  6   AST (SGOT) U/L  --   --  11   GLUCOSE mg/dL 116*   < > 101*    < > = values in this interval not displayed.     Imaging Results (Last 72 Hours)     ** No results found for the last 72 hours. **          Diagnostics: Reviewed    A: Tre Kinney is a 89 y.o. female admitted on 10/5/2022 to [facility] for physical therapy for ten days. She was originally admitted to the hospital on 9/19/2022 due to a fall and symptomatic anemia. She has a medical history CHF, chronic kidney disease, coronary artery disease s/p stenting, history of blood clots, chronic anticoagulation with Coumadin, SSS s/p pacemaker placement, chronic diastolic CHF, and iron deficiency anemia who presents today with a chief complaint of a fall. Per pt she has become progressively more weak and has experienced several falls. Pt has sustained a moderate acute superior endplate vertebral compression fracture at the L1 with about 40% loss of vertebral body height and mild posterior osseous retropulsion of about 6 mm and also noted to have chronic compression fracture deformities involving the inferior endplates of L3 and L4 due  to her first fall and from her second fall she does have a large hematoma on her right side, which led her to this admission. Pt had a hemoglobin of 6.1 for which she received 2 units of PRBC's. Pt denies any known source of bleeding and does say she had a scope(EGD colonoscopy at [facility] in 2018.    Today 10/10/2022  Pt was awake alert and oriented, she was afebrile, on room air saturating 95%, HR 77, RR 16 and BP of 153/78. Tre denied any pain, nausea, anxiety, or shortness of breath at this time.           P:   Palliative was consulted to discuss GOC/ACP support for pt and family. Palliative was able to speak with patients son Leno today to let him know that we were re-consulted. We let him know that Tre was working with PT when we entered the room at lunch time, and that we helped set up her lunch and she was eating at her potato, and that Tre told us to tell him that she was trying. eLno was under the impression that depending on how well Tre works with Therapy that she may be able to get an extension of swing bed days. I have reached out to Kelly in  to check and see what Tre's options would be, whether it be an extension on swing bed days or would she qualify for rehab placement. I will continue to follow up with patient and family.    We appreciate the consult and the opportunity to participate in Tre Kinney's care. We will continue to follow along. Please do not hesitate to contact us regarding further symptom management or goals of care needs, including after hours or on weekends via our on call provider at [phone].     Time: 30 minutes spent reviewing medical and medication records, assessing and examining patient, discussing with family, answering questions, providing some guidance about a plan and documentation of care, and coordinating care with other healthcare members, with > 50% time spent face to face.     Liyah Jaquez, APRN    10/10/2022

## 2022-10-10 NOTE — CASE MANAGEMENT/SOCIAL WORK
Discharge Planning Assessment  [facility]     Patient Name: Tre Kinney  MRN: [id number]  Today's Date: 10/10/2022    Admit Date: 10/5/2022    Plan: Pt was approved for 10 days of swing bed admission on 10/5/22. Pt was admitted to swing bed on 10/5/22. Pt lives with her son, Valdez and she plans to return home at discharge. Pt did not utilize home health services prior to admission. Pt has O2 PRN, wheelchair, rolling walker and bedside commode via [facility]. PCP is Jessica Pedro. Pt did not have a POA or living will. SS to follow and assist as needed with discharge planning.     Discharge Plan     Row Name 10/10/22 1050       Plan    Plan Pt was approved for 10 days of swing bed admission on 10/5/22. Pt was admitted to swing bed on 10/5/22. Pt lives with her son, Valdez and she plans to return home at discharge. Pt did not utilize home health services prior to admission. Pt has O2 PRN, wheelchair, rolling walker and bedside commode via [facility]. PCP is Jessica Pedro. Pt did not have a POA or living will. SS to follow and assist as needed with discharge planning.                    XANDER Craig

## 2022-10-11 LAB
ANION GAP SERPL CALCULATED.3IONS-SCNC: 11 MMOL/L (ref 5–15)
BUN SERPL-MCNC: 36 MG/DL (ref 8–23)
BUN/CREAT SERPL: 17.1 (ref 7–25)
CALCIUM SPEC-SCNC: 9 MG/DL (ref 8.6–10.5)
CHLORIDE SERPL-SCNC: 98 MMOL/L (ref 98–107)
CO2 SERPL-SCNC: 27 MMOL/L (ref 22–29)
CREAT SERPL-MCNC: 2.11 MG/DL (ref 0.57–1)
EGFRCR SERPLBLD CKD-EPI 2021: 22 ML/MIN/1.73
GLUCOSE SERPL-MCNC: 104 MG/DL (ref 65–99)
MAGNESIUM SERPL-MCNC: 2.2 MG/DL (ref 1.6–2.4)
POTASSIUM SERPL-SCNC: 3.6 MMOL/L (ref 3.5–5.2)
SODIUM SERPL-SCNC: 136 MMOL/L (ref 136–145)

## 2022-10-11 PROCEDURE — 97116 GAIT TRAINING THERAPY: CPT

## 2022-10-11 PROCEDURE — 94799 UNLISTED PULMONARY SVC/PX: CPT

## 2022-10-11 PROCEDURE — 97110 THERAPEUTIC EXERCISES: CPT

## 2022-10-11 PROCEDURE — 80048 BASIC METABOLIC PNL TOTAL CA: CPT | Performed by: INTERNAL MEDICINE

## 2022-10-11 PROCEDURE — 25010000002 HEPARIN (PORCINE) PER 1000 UNITS: Performed by: INTERNAL MEDICINE

## 2022-10-11 PROCEDURE — 83735 ASSAY OF MAGNESIUM: CPT | Performed by: INTERNAL MEDICINE

## 2022-10-11 PROCEDURE — 97530 THERAPEUTIC ACTIVITIES: CPT

## 2022-10-11 RX ORDER — POTASSIUM CHLORIDE 20 MEQ/1
40 TABLET, EXTENDED RELEASE ORAL ONCE
Status: COMPLETED | OUTPATIENT
Start: 2022-10-11 | End: 2022-10-11

## 2022-10-11 RX ADMIN — POTASSIUM CHLORIDE 40 MEQ: 20 TABLET, EXTENDED RELEASE ORAL at 10:44

## 2022-10-11 RX ADMIN — Medication 10 ML: at 08:25

## 2022-10-11 RX ADMIN — HYDRALAZINE HYDROCHLORIDE 25 MG: 25 TABLET, FILM COATED ORAL at 05:07

## 2022-10-11 RX ADMIN — HYDRALAZINE HYDROCHLORIDE 25 MG: 25 TABLET, FILM COATED ORAL at 13:14

## 2022-10-11 RX ADMIN — DOCUSATE SODIUM 100 MG: 100 CAPSULE ORAL at 20:52

## 2022-10-11 RX ADMIN — Medication 10 MG: at 20:52

## 2022-10-11 RX ADMIN — DOCUSATE SODIUM 100 MG: 100 CAPSULE ORAL at 08:25

## 2022-10-11 RX ADMIN — HEPARIN SODIUM 5000 UNITS: 5000 INJECTION INTRAVENOUS; SUBCUTANEOUS at 08:24

## 2022-10-11 RX ADMIN — HYDRALAZINE HYDROCHLORIDE 25 MG: 25 TABLET, FILM COATED ORAL at 21:00

## 2022-10-11 RX ADMIN — ESCITALOPRAM 10 MG: 10 TABLET, FILM COATED ORAL at 08:24

## 2022-10-11 RX ADMIN — FUROSEMIDE 40 MG: 40 TABLET ORAL at 08:24

## 2022-10-11 RX ADMIN — METOPROLOL TARTRATE 12.5 MG: 25 TABLET, FILM COATED ORAL at 08:24

## 2022-10-11 RX ADMIN — PANTOPRAZOLE SODIUM 40 MG: 40 TABLET, DELAYED RELEASE ORAL at 05:07

## 2022-10-11 RX ADMIN — GABAPENTIN 300 MG: 300 CAPSULE ORAL at 20:52

## 2022-10-11 RX ADMIN — METOPROLOL TARTRATE 12.5 MG: 25 TABLET, FILM COATED ORAL at 20:52

## 2022-10-11 RX ADMIN — FUROSEMIDE 40 MG: 40 TABLET ORAL at 16:57

## 2022-10-11 RX ADMIN — Medication 10 ML: at 20:52

## 2022-10-11 RX ADMIN — HEPARIN SODIUM 5000 UNITS: 5000 INJECTION INTRAVENOUS; SUBCUTANEOUS at 20:52

## 2022-10-11 NOTE — PROGRESS NOTES
"Palliative Care Daily Progress Note     S: Medical record reviewed, followed up with Primary RN Pearl and Dr Loredo regarding patient's condition. When palliative care entered the room the pt was resting in bed quietly, she was awake alert and oriented, she denied pain nausea, shortness of breath or anxiety at this time. She stated that she has been trying to eat more. She says she still feels weak and ia trying to work with therapy.      O:   Palliative Performance Scale Score:     /71   Pulse 86   Temp 97.9 °F (36.6 °C) (Oral)   Resp 18   Ht 170.2 cm (67.01\")   Wt 71.8 kg (158 lb 3.2 oz)   SpO2 95%   BMI 24.77 kg/m²     Intake/Output Summary (Last 24 hours) at 10/11/2022 1638  Last data filed at 10/11/2022 1200  Gross per 24 hour   Intake 367.14 ml   Output 1050 ml   Net -682.86 ml       PE:  General Appearance:    Pt appears weak, alert, cooperative, NAD   HEENT:    NC/AT, without obvious abnormality, EOMI, anicteric, nasal congestion present   Neck:   supple, trachea midline, no JVD   Lungs:     Regular even respirations, CTA, on room air, no distress    Heart:    RRR, normal S1 and S2, no M/R/G   Abdomen:     Soft, NT, ND, NABS    Extremities:   Moves all extremities, no edema   Pulses:   Pulses palpable and equal bilaterally   Skin:   Warm, dry   Neurologic:   A/Ox3, cooperative, weakness   Psych:   Calm, appropriate            Meds: Reviewed and changes noted.    Labs:   Results from last 7 days   Lab Units 10/10/22  0246   WBC 10*3/mm3 6.70   HEMOGLOBIN g/dL 10.3*   HEMATOCRIT % 31.6*   PLATELETS 10*3/mm3 201     Results from last 7 days   Lab Units 10/11/22  0304   SODIUM mmol/L 136   POTASSIUM mmol/L 3.6   CHLORIDE mmol/L 98   CO2 mmol/L 27.0   BUN mg/dL 36*   CREATININE mg/dL 2.11*   GLUCOSE mg/dL 104*   CALCIUM mg/dL 9.0     Results from last 7 days   Lab Units 10/11/22  0304 10/07/22  0047 10/05/22  0311   SODIUM mmol/L 136   < > 137   POTASSIUM mmol/L 3.6   < > 3.7   CHLORIDE mmol/L 98   < " > 98   CO2 mmol/L 27.0   < > 24.1   BUN mg/dL 36*   < > 21   CREATININE mg/dL 2.11*   < > 1.90*   CALCIUM mg/dL 9.0   < > 9.3   BILIRUBIN mg/dL  --   --  0.8   ALK PHOS U/L  --   --  66   ALT (SGPT) U/L  --   --  6   AST (SGOT) U/L  --   --  11   GLUCOSE mg/dL 104*   < > 101*    < > = values in this interval not displayed.     Imaging Results (Last 72 Hours)     ** No results found for the last 72 hours. **            Diagnostics: Reviewed    A: rTe Kinney is a 89 y.o. female admitted on 10/5/2022 to [facility] bed for physical therapy for ten days. She was originally admitted to the hospital on 9/19/2022 due to a fall and symptomatic anemia. She has a medical history CHF, chronic kidney disease, coronary artery disease s/p stenting, history of blood clots, chronic anticoagulation with Coumadin, SSS s/p pacemaker placement, chronic diastolic CHF, and iron deficiency anemia who presents today with a chief complaint of a fall. Per pt she has become progressively more weak and has experienced several falls. Pt has sustained a moderate acute superior endplate vertebral compression fracture at the L1 with about 40% loss of vertebral body height and mild posterior osseous retropulsion of about 6 mm and also noted to have chronic compression fracture deformities involving the inferior endplates of L3 and L4 due to her first fall and from her second fall she does have a large hematoma on her right side, which led her to this admission. Pt had a hemoglobin of 6.1 for which she received 2 units of PRBC's. Pt denies any known source of bleeding and does say she had a scope(EGD colonoscopy at [facility] in 2018.  Today 10/11/2022  T 97.9, HR 86, RR 18, /71 and was saturating 95% on room air and was in no distress.    P:  I was able to speak with patients son Leno to update and and provide support. We discussed that while Tre is working with PT that she has not made any forward improvement. We discussed that they would be looking  at a nursing facility placement as his brother Valdez would not be able to care for her at home if she is unable to ambulate. Leno expressed concern regarding his mothers complaints that her left leg was hurting her. I let him know that I would discuss tre with Dr Loredo tomorrow am and that I would also speak with Tre.      We will continue to follow along. Please do not hesitate to contact us regarding further sx mgmt or GOC needs, including after hours or on weekends via our on call provider at [phone].     Liyah Jaquez, APRN    10/11/2022

## 2022-10-11 NOTE — PLAN OF CARE
Goal Outcome Evaluation:            Pt resting in bed throughout this shift. Remained in bed this shift, cath care completed. Awaiting lab results at this itme. VSS, no c/o or s/sx of discomfort. Will continue to monitor for changes.

## 2022-10-11 NOTE — THERAPY TREATMENT NOTE
Acute Care - Physical Therapy Treatment Note   [place]     Patient Name: Tre Kinney  : 1932  MRN: [id number]  Today's Date: 10/11/2022   Onset of Illness/Injury or Date of Surgery: 10/06/22  Visit Dx:   No diagnosis found.  Patient Active Problem List   Diagnosis   • Acid reflux   • Ankle arthralgia   • Cardiac conduction disorder   • Closed fracture of distal fibula   • Arteriosclerosis of coronary artery   • Fracture of distal end of tibia   • Elevated cholesterol   • BP (high blood pressure)   • Hypertrophic polyarthritis   • OP (osteoporosis)   • Right heart failure (HCC)   • Heart failure, chronic, right-sided (HCC)   • SOB (shortness of breath)   • Palpitations   • CHF (congestive heart failure) (HCC)   • Congestive heart failure (HCC)   • Precordial pain   • Chronic anticoagulation   • Hypokalemia   • Anemia, unspecified type   • Generalized weakness     Past Medical History:   Diagnosis Date   • Anemia    • Arthritis    • CHF (congestive heart failure) (HCC)    • Chronic kidney disease    • Coronary artery disease    • H/O blood clots    • History of transfusion     no reaction    • Hypertension      Past Surgical History:   Procedure Laterality Date   • ABDOMINAL SURGERY     • APPENDECTOMY     • CARDIAC SURGERY     •  SECTION     • COLONOSCOPY N/A 3/7/2018    Procedure: COLONOSCOPY;  Surgeon: Willie Gresham MD;  Location:  [facility];  Service:    • CORONARY ANGIOPLASTY WITH STENT PLACEMENT     • ENDOSCOPY N/A 3/6/2018    Procedure: ESOPHAGOGASTRODUODENOSCOPY;  Surgeon: Willie Gresham MD;  Location:  [facility];  Service:    • PACEMAKER IMPLANTATION      left side    • SKIN BIOPSY     • TUBAL ABDOMINAL LIGATION       PT Assessment (last 12 hours)     PT Evaluation and Treatment     Row Name 10/11/22 1431          Physical Therapy Time and Intention    Subjective Information complains of;weakness;fatigue  -CT     Document Type therapy note (daily note)  -CT     Mode of  Treatment physical therapy  -CT     Patient Effort adequate  -CT     Comment pt continues to be max/dep x 2 for transfers  -CT     Row Name 10/11/22 1431          General Information    Patient Profile Reviewed yes  -CT     Existing Precautions/Restrictions fall;TLSO  pt continues to decline TLSO use  -CT     Limitations/Impairments safety/cognitive  -CT     Row Name 10/11/22 1431          Home Use of Assistive/Adaptive Equipment    Equipment Currently Used at Home wheelchair;commode;walker, rolling;oxygen  -CT     Row Name 10/11/22 1431          Cognition    Affect/Mental Status (Cognition) WFL  -CT     Orientation Status (Cognition) oriented x 3  -CT     Follows Commands (Cognition) WFL  -CT     Row Name 10/11/22 1431          Bed Mobility    Bed Mobility sit-supine;supine-sit;scooting/bridging;rolling left;rolling right  -CT     Rolling Left [place] (Bed Mobility) minimum assist (75% patient effort)  -CT     Rolling Right [place] (Bed Mobility) minimum assist (75% patient effort)  -CT     Scooting/Bridging [place] (Bed Mobility) minimum assist (75% patient effort)  -CT     Supine-Sit [place] (Bed Mobility) minimum assist (75% patient effort)  -CT     Sit-Supine [place] (Bed Mobility) moderate assist (50% patient effort);2 person assist;verbal cues;nonverbal cues (demo/gesture)  -CT     Bed Mobility, Safety Issues decreased use of arms for pushing/pulling;decreased use of legs for bridging/pushing;impaired trunk control for bed mobility  -CT     Assistive Device (Bed Mobility) bed rails;draw sheet  -CT     Row Name 10/11/22 1431          Transfers    Transfers bed-chair transfer;chair-bed transfer  -CT     Row Name 10/11/22 1431          Bed-Chair Transfer    Bed-Chair [place] (Transfers) maximum assist (25% patient effort);dependent (less than 25% patient effort);2 person assist  -CT     Assistive Device (Bed-Chair Transfers) walker, front-wheeled  -CT     Row Name 10/11/22 1431           Chair-Bed Transfer    Chair-Bed [place] (Transfers) maximum assist (25% patient effort);dependent (less than 25% patient effort);2 person assist  -CT     Assistive Device (Chair-Bed Transfers) walker, front-wheeled  -CT     Row Name 10/11/22 1431          Sit-Stand Transfer    Sit-Stand [place] (Transfers) moderate assist (50% patient effort);2 person assist;verbal cues  -CT     Assistive Device (Sit-Stand Transfers) walker, front-wheeled  -CT     Row Name 10/11/22 1431          Stand-Sit Transfer    Stand-Sit [place] (Transfers) moderate assist (50% patient effort);2 person assist;verbal cues  -CT     Assistive Device (Stand-Sit Transfers) walker, front-wheeled  -CT     Row Name 10/11/22 1431          Gait/Stairs (Locomotion)    [place] Level (Gait) maximum assist (25% patient effort);2 person assist;verbal cues;nonverbal cues (demo/gesture)  -CT     Assistive Device (Gait) walker, front-wheeled  -CT     Distance in Feet (Gait) 3 ft during transfer  -CT     Pattern (Gait) step-to  -CT     Deviations/Abnormal Patterns (Gait) gait speed decreased;festinating/shuffling;weight shifting decreased  -CT     Bilateral Gait Deviations weight shift ability decreased;forward flexed posture  -CT     Row Name 10/11/22 1431          Plan of Care Review    Plan of Care Reviewed With patient  -CT     Progress improving  -CT     Row Name 10/11/22 1431          Positioning and Restraints    Pre-Treatment Position in bed  -CT     Post Treatment Position chair  -CT     In Chair sitting;call light within reach;encouraged to call for assist  -CT     Row Name 10/11/22 [facility]          Therapy Assessment/Plan (PT)    Rehab Potential (PT) fair, will monitor progress closely  -CT     Criteria for Skilled Interventions Met (PT) yes;meets criteria  -CT     Therapy Frequency (PT) 5 times/wk  5-6 times/wk  -CT           User Key  (r) = Recorded By, (t) = Taken By, (c) = Cosigned By    Initials Name Provider Type    CT  Bhargavi Vásquez, EDEL Physical Therapist                  PT Recommendation and Plan  Anticipated Discharge Disposition (PT): home with /7 care, home with home health  Planned Therapy Interventions (PT): balance training, bed mobility training, gait training, home exercise program, manual therapy techniques, motor coordination training, neuromuscular re-education, patient/family education, postural re-education, strengthening, transfer training  Therapy Frequency (PT): 5 times/wk (5-6 times/wk)  Plan of Care Reviewed With: patient  Progress: improving       Time Calculation:    PT Charges     Row Name 10/11/22 1434             Time Calculation    PT Received On 10/11/22  -CT         Time Calculation- PT    Total Timed Code Minutes- PT 43 minute(s)  -CT            User Key  (r) = Recorded By, (t) = Taken By, (c) = Cosigned By    Initials Name Provider Type    CT Bhargavi Vásquez, PT Physical Therapist              Therapy Charges for Today     Code Description Service Date Service Provider Modifiers Qty    51424414202 HC GAIT TRAINING EA 15 MIN 10/10/2022 Bhargavi Vásquez, PT GP 1    48991437356 HC PT THERAPEUTIC ACT EA 15 MIN 10/10/2022 Bhargavi Vásquez, PT GP 2    88678335981 HC GAIT TRAINING EA 15 MIN 10/11/2022 Bhargavi Vásquez, PT GP 1    49084654324 HC PT THERAPEUTIC ACT EA 15 MIN 10/11/2022 Bhargavi Vásquez, PT GP 1    09886918518 HC PT THER PROC EA 15 MIN 10/11/2022 Bhargavi Vásquez, PT GP 1          PT G-Codes  Outcome Measure Options: AM-PAC 6 Clicks Daily Activity (OT)  AM-PAC 6 Clicks Score (PT): 11  AM-PAC 6 Clicks Score (OT): 13    Bhargavi Vásquez PT  10/11/2022

## 2022-10-11 NOTE — PROGRESS NOTES
Pt admitted to swing bed on 10/5 for additional therapy in the setting of generalized weakness/debility. Pt not seen or examined today. I have reviewed her VS and labs. I have discussed her care with SONAM Kang who does not report any acute events or issues that need to be addressed. BP stable after hydralazine recently decreased. Renal function stable on AM labs so will continue current diuretic regimen. Replace K+. Cont PT/OT. Repeat labs in the AM.       Mustapha Loredo,   10/11/22  15:52 EDT

## 2022-10-11 NOTE — PLAN OF CARE
Goal Outcome Evaluation:              Outcome Evaluation: Pt is resting in bed at this time. Pt worked with PT and sat up in chair this shift. Potassium 3.6 and replaced per order. Will continue plan of care.

## 2022-10-11 NOTE — THERAPY TREATMENT NOTE
Patient Name: Tre Kinney  : 1932    MRN: [id number]                              Today's Date: 10/11/2022       Admit Date: 10/5/2022    Visit Dx: No diagnosis found.  Patient Active Problem List   Diagnosis   • Acid reflux   • Ankle arthralgia   • Cardiac conduction disorder   • Closed fracture of distal fibula   • Arteriosclerosis of coronary artery   • Fracture of distal end of tibia   • Elevated cholesterol   • BP (high blood pressure)   • Hypertrophic polyarthritis   • OP (osteoporosis)   • Right heart failure (HCC)   • Heart failure, chronic, right-sided (HCC)   • SOB (shortness of breath)   • Palpitations   • CHF (congestive heart failure) (HCC)   • Congestive heart failure (HCC)   • Precordial pain   • Chronic anticoagulation   • Hypokalemia   • Anemia, unspecified type   • Generalized weakness     Past Medical History:   Diagnosis Date   • Anemia    • Arthritis    • CHF (congestive heart failure) (HCC)    • Chronic kidney disease    • Coronary artery disease    • H/O blood clots    • History of transfusion     no reaction    • Hypertension      Past Surgical History:   Procedure Laterality Date   • ABDOMINAL SURGERY     • APPENDECTOMY     • CARDIAC SURGERY     •  SECTION     • COLONOSCOPY N/A 3/7/2018    Procedure: COLONOSCOPY;  Surgeon: Willie Gresham MD;  Location:  [facility];  Service:    • CORONARY ANGIOPLASTY WITH STENT PLACEMENT     • ENDOSCOPY N/A 3/6/2018    Procedure: ESOPHAGOGASTRODUODENOSCOPY;  Surgeon: Willie Gresham MD;  Location:  [facility];  Service:    • PACEMAKER IMPLANTATION      left side    • SKIN BIOPSY     • TUBAL ABDOMINAL LIGATION        General Information     Row Name 10/11/22 1338          OT Time and Intention    Document Type therapy note (daily note)  -     Mode of Treatment individual therapy;occupational therapy  -     Row Name 10/11/22 1338          General Information    Patient Profile Reviewed yes  -     Existing  Precautions/Restrictions fall;TLSO  -KP     Barriers to Rehab none identified  -     Row Name 10/11/22 1338          Cognition    Orientation Status (Cognition) oriented x 3  -     Row Name 10/11/22 1338          Safety Issues, Functional Mobility    Impairments Affecting Function (Mobility) balance;strength;endurance/activity tolerance  -           User Key  (r) = Recorded By, (t) = Taken By, (c) = Cosigned By    Initials Name Provider Type    Linn Richardson OT Occupational Therapist                 Mobility/ADL's    No documentation.                Obj/Interventions     Row Name 10/11/22 1342          Motor Skills    Motor Skills functional endurance  -KP     Functional Endurance fair plus  -     Therapeutic Exercise --  BUE AROM exercises through functional movement patterns X20 reps X2 sets from sitting up in bed; pull to sit X10 reps  -           User Key  (r) = Recorded By, (t) = Taken By, (c) = Cosigned By    Initials Name Provider Type    Linn Richardson OT Occupational Therapist               Goals/Plan    No documentation.                Clinical Impression     Row Name 10/11/22 1343          Pain Assessment    Pretreatment Pain Rating 0/10 - no pain  -     Posttreatment Pain Rating 0/10 - no pain  -     Row Name 10/11/22 1343          Plan of Care Review    Plan of Care Reviewed With patient  -     Progress improving  -     Outcome Evaluation Patient seen for OT treatment on this date. She is improving with functional endurance/activity tolerance, but still limited to bed exercises. Patient had been up in chair to eat earlier on this date.  -     Row Name 10/11/22 1343          Positioning and Restraints    Pre-Treatment Position in bed  -     Post Treatment Position bed  -     In Bed call light within reach;with family/caregiver  -           User Key  (r) = Recorded By, (t) = Taken By, (c) = Cosigned By    Initials Name Provider Type    Linn Richardson, VELASQUEZ Occupational  Therapist               Outcome Measures     Row Name 10/11/22 1347          How much help from another is currently needed...    Putting on and taking off regular lower body clothing? 1  -KP     Bathing (including washing, rinsing, and drying) 2  -KP     Toileting (which includes using toilet bed pan or urinal) 2  -KP     Putting on and taking off regular upper body clothing 2  -KP     Taking care of personal grooming (such as brushing teeth) 3  -KP     Eating meals 3  -KP     AM-PAC 6 Clicks Score (OT) 13  -KP     Row Name 10/11/22 0824          How much help from another person do you currently need...    Turning from your back to your side while in flat bed without using bedrails? 3  -VL     Moving from lying on back to sitting on the side of a flat bed without bedrails? 2  -VL     Moving to and from a bed to a chair (including a wheelchair)? 2  -VL     Standing up from a chair using your arms (e.g., wheelchair, bedside chair)? 2  -VL     Climbing 3-5 steps with a railing? 1  -VL     To walk in hospital room? 1  -VL     AM-PAC 6 Clicks Score (PT) 11  -VL     Highest level of mobility 4 --> Transferred to chair/commode  -VL     Row Name 10/11/22 6592          Functional Assessment    Outcome Measure Options AM-PAC 6 Clicks Daily Activity (OT)  -           User Key  (r) = Recorded By, (t) = Taken By, (c) = Cosigned By    Initials Name Provider Type    Linn Richardson OT Occupational Therapist    VL Susan Guzmán, RN Registered Nurse                  OT Recommendation and Plan     Plan of Care Review  Plan of Care Reviewed With: patient  Progress: improving  Outcome Evaluation: Patient seen for OT treatment on this date. She is improving with functional endurance/activity tolerance, but still limited to bed exercises. Patient had been up in chair to eat earlier on this date.     Time Calculation:    Time Calculation- OT     Row Name 10/11/22 3402             Time Calculation- OT    OT Received On 10/11/22   -KP         Timed Charges    32419 - OT Therapeutic Exercise Minutes 18  -KP         Total Minutes    Timed Charges Total Minutes 18  -KP       Total Minutes 18  -KP            User Key  (r) = Recorded By, (t) = Taken By, (c) = Cosigned By    Initials Name Provider Type    Linn Richardson OT Occupational Therapist              Therapy Charges for Today     Code Description Service Date Service Provider Modifiers Qty    41320408567 HC OT THER PROC EA 15 MIN 10/10/2022 Linn Quevedo OT GO 2    77625834766 HC OT THER PROC EA 15 MIN 10/11/2022 Linn Quevedo OT GO 1               Linn Quevedo OT  10/11/2022

## 2022-10-12 LAB
ANION GAP SERPL CALCULATED.3IONS-SCNC: 11.3 MMOL/L (ref 5–15)
BUN SERPL-MCNC: 39 MG/DL (ref 8–23)
BUN/CREAT SERPL: 18.2 (ref 7–25)
CALCIUM SPEC-SCNC: 9.1 MG/DL (ref 8.6–10.5)
CHLORIDE SERPL-SCNC: 100 MMOL/L (ref 98–107)
CO2 SERPL-SCNC: 25.7 MMOL/L (ref 22–29)
CREAT SERPL-MCNC: 2.14 MG/DL (ref 0.57–1)
EGFRCR SERPLBLD CKD-EPI 2021: 21.7 ML/MIN/1.73
GLUCOSE SERPL-MCNC: 113 MG/DL (ref 65–99)
POTASSIUM SERPL-SCNC: 4.3 MMOL/L (ref 3.5–5.2)
SODIUM SERPL-SCNC: 137 MMOL/L (ref 136–145)

## 2022-10-12 PROCEDURE — 94799 UNLISTED PULMONARY SVC/PX: CPT

## 2022-10-12 PROCEDURE — 94761 N-INVAS EAR/PLS OXIMETRY MLT: CPT

## 2022-10-12 PROCEDURE — 97110 THERAPEUTIC EXERCISES: CPT

## 2022-10-12 PROCEDURE — 25010000002 HEPARIN (PORCINE) PER 1000 UNITS: Performed by: INTERNAL MEDICINE

## 2022-10-12 PROCEDURE — 80048 BASIC METABOLIC PNL TOTAL CA: CPT | Performed by: INTERNAL MEDICINE

## 2022-10-12 PROCEDURE — 97116 GAIT TRAINING THERAPY: CPT

## 2022-10-12 PROCEDURE — 97530 THERAPEUTIC ACTIVITIES: CPT

## 2022-10-12 PROCEDURE — 25010000002 ENOXAPARIN PER 10 MG: Performed by: INTERNAL MEDICINE

## 2022-10-12 RX ORDER — ENOXAPARIN SODIUM 100 MG/ML
1 INJECTION SUBCUTANEOUS EVERY 24 HOURS
Status: DISCONTINUED | OUTPATIENT
Start: 2022-10-12 | End: 2022-10-17

## 2022-10-12 RX ADMIN — HYDRALAZINE HYDROCHLORIDE 25 MG: 25 TABLET, FILM COATED ORAL at 13:27

## 2022-10-12 RX ADMIN — HEPARIN SODIUM 5000 UNITS: 5000 INJECTION INTRAVENOUS; SUBCUTANEOUS at 08:39

## 2022-10-12 RX ADMIN — HYDRALAZINE HYDROCHLORIDE 25 MG: 25 TABLET, FILM COATED ORAL at 05:20

## 2022-10-12 RX ADMIN — METOPROLOL TARTRATE 12.5 MG: 25 TABLET, FILM COATED ORAL at 20:32

## 2022-10-12 RX ADMIN — PANTOPRAZOLE SODIUM 40 MG: 40 TABLET, DELAYED RELEASE ORAL at 05:20

## 2022-10-12 RX ADMIN — Medication 10 ML: at 20:34

## 2022-10-12 RX ADMIN — Medication 10 MG: at 20:32

## 2022-10-12 RX ADMIN — ESCITALOPRAM 10 MG: 10 TABLET, FILM COATED ORAL at 08:39

## 2022-10-12 RX ADMIN — DOCUSATE SODIUM 100 MG: 100 CAPSULE ORAL at 08:39

## 2022-10-12 RX ADMIN — DOCUSATE SODIUM 100 MG: 100 CAPSULE ORAL at 20:32

## 2022-10-12 RX ADMIN — METOPROLOL TARTRATE 12.5 MG: 25 TABLET, FILM COATED ORAL at 08:39

## 2022-10-12 RX ADMIN — ENOXAPARIN SODIUM 70 MG: 80 INJECTION SUBCUTANEOUS at 20:32

## 2022-10-12 RX ADMIN — HYDRALAZINE HYDROCHLORIDE 25 MG: 25 TABLET, FILM COATED ORAL at 22:01

## 2022-10-12 RX ADMIN — Medication 10 ML: at 08:40

## 2022-10-12 RX ADMIN — FUROSEMIDE 40 MG: 40 TABLET ORAL at 18:15

## 2022-10-12 RX ADMIN — FUROSEMIDE 40 MG: 40 TABLET ORAL at 08:39

## 2022-10-12 NOTE — THERAPY TREATMENT NOTE
Acute Care - Physical Therapy Treatment Note   [place]     Patient Name: Tre Kinney  : 1932  MRN: [id number]  Today's Date: 10/12/2022   Onset of Illness/Injury or Date of Surgery: 10/06/22  Visit Dx:   No diagnosis found.  Patient Active Problem List   Diagnosis   • Acid reflux   • Ankle arthralgia   • Cardiac conduction disorder   • Closed fracture of distal fibula   • Arteriosclerosis of coronary artery   • Fracture of distal end of tibia   • Elevated cholesterol   • BP (high blood pressure)   • Hypertrophic polyarthritis   • OP (osteoporosis)   • Right heart failure (HCC)   • Heart failure, chronic, right-sided (HCC)   • SOB (shortness of breath)   • Palpitations   • CHF (congestive heart failure) (HCC)   • Congestive heart failure (HCC)   • Precordial pain   • Chronic anticoagulation   • Hypokalemia   • Anemia, unspecified type   • Generalized weakness     Past Medical History:   Diagnosis Date   • Anemia    • Arthritis    • CHF (congestive heart failure) (HCC)    • Chronic kidney disease    • Coronary artery disease    • H/O blood clots    • History of transfusion     no reaction    • Hypertension      Past Surgical History:   Procedure Laterality Date   • ABDOMINAL SURGERY     • APPENDECTOMY     • CARDIAC SURGERY     •  SECTION     • COLONOSCOPY N/A 3/7/2018    Procedure: COLONOSCOPY;  Surgeon: Willie Gresham MD;  Location:  [facility];  Service:    • CORONARY ANGIOPLASTY WITH STENT PLACEMENT     • ENDOSCOPY N/A 3/6/2018    Procedure: ESOPHAGOGASTRODUODENOSCOPY;  Surgeon: Willie Gresham MD;  Location:  [facility];  Service:    • PACEMAKER IMPLANTATION      left side    • SKIN BIOPSY     • TUBAL ABDOMINAL LIGATION       PT Assessment (last 12 hours)     PT Evaluation and Treatment     Row Name 10/12/22 1437          Physical Therapy Time and Intention    Subjective Information complains of;weakness;fatigue  -CT     Document Type therapy note (daily note)  BID treatment sessions   -CT     Mode of Treatment physical therapy  -CT     Patient Effort adequate  -CT     Comment BID sessions. Pt sat up in chair aprox 2 hours this date  -CT     Row Name 10/12/22 1437          General Information    Patient Profile Reviewed yes  -CT     Existing Precautions/Restrictions fall;TLSO  pt continues to decline TLSO use  -CT     Limitations/Impairments safety/cognitive  -CT     Row Name 10/12/22 1437          Home Use of Assistive/Adaptive Equipment    Equipment Currently Used at Home wheelchair;commode;walker, rolling;oxygen  -CT     Row Name 10/12/22 1437          Cognition    Affect/Mental Status (Cognition) WFL  -CT     Orientation Status (Cognition) oriented x 3  -CT     Follows Commands (Cognition) WFL  -CT     Row Name 10/12/22 1437          Bed Mobility    Bed Mobility sit-supine;supine-sit;scooting/bridging;rolling left;rolling right  -CT     Rolling Left [place] (Bed Mobility) minimum assist (75% patient effort)  -CT     Rolling Right [place] (Bed Mobility) minimum assist (75% patient effort)  -CT     Scooting/Bridging [place] (Bed Mobility) minimum assist (75% patient effort)  -CT     Supine-Sit [place] (Bed Mobility) minimum assist (75% patient effort)  -CT     Sit-Supine [place] (Bed Mobility) moderate assist (50% patient effort);2 person assist;verbal cues;nonverbal cues (demo/gesture)  -CT     Bed Mobility, Safety Issues decreased use of arms for pushing/pulling;decreased use of legs for bridging/pushing;impaired trunk control for bed mobility  -CT     Assistive Device (Bed Mobility) bed rails;draw sheet  -CT     Row Name 10/12/22 1437          Transfers    Transfers bed-chair transfer;chair-bed transfer  -CT     Row Name 10/12/22 1437          Bed-Chair Transfer    Bed-Chair [place] (Transfers) maximum assist (25% patient effort);dependent (less than 25% patient effort);2 person assist  -CT     Assistive Device (Bed-Chair Transfers) walker, front-wheeled  -CT      Row Name 10/12/22 1437          Chair-Bed Transfer    Chair-Bed [place] (Transfers) maximum assist (25% patient effort);dependent (less than 25% patient effort);2 person assist  -CT     Assistive Device (Chair-Bed Transfers) walker, front-wheeled  -CT     Row Name 10/12/22 1437          Sit-Stand Transfer    Sit-Stand [place] (Transfers) moderate assist (50% patient effort);2 person assist;verbal cues  -CT     Assistive Device (Sit-Stand Transfers) walker, front-wheeled  -CT     Row Name 10/12/22 1437          Stand-Sit Transfer    Stand-Sit [place] (Transfers) moderate assist (50% patient effort);2 person assist;verbal cues  -CT     Assistive Device (Stand-Sit Transfers) walker, front-wheeled  -CT     Row Name 10/12/22 1437          Gait/Stairs (Locomotion)    [place] Level (Gait) maximum assist (25% patient effort);2 person assist;verbal cues;nonverbal cues (demo/gesture)  -CT     Assistive Device (Gait) walker, front-wheeled  -CT     Distance in Feet (Gait) 3 ft during transfer  -CT     Pattern (Gait) step-to  -CT     Deviations/Abnormal Patterns (Gait) gait speed decreased;festinating/shuffling;weight shifting decreased  -CT     Bilateral Gait Deviations weight shift ability decreased;forward flexed posture  -CT     Comment, (Gait/Stairs) Pt attempts to sit early in transfer  -CT     Row Name 10/12/22 1437          Motor Skills    Therapeutic Exercise --  BLE sitting ther ex  -CT     Row Name 10/12/22 1437          Positioning and Restraints    Pre-Treatment Position in bed  -CT     In Bed call light within reach;encouraged to call for assist;exit alarm on;side rails up x3  -CT     In Chair sitting;call light within reach;encouraged to call for assist  pt sat up in chair aprox 2 hours  -CT     Row Name 10/12/22 1437          Therapy Assessment/Plan (PT)    Rehab Potential (PT) fair, will monitor progress closely  -CT     Criteria for Skilled Interventions Met (PT) yes;meets criteria  -CT      Therapy Frequency (PT) 5 times/wk  5-6 times/wk  -CT           User Key  (r) = Recorded By, (t) = Taken By, (c) = Cosigned By    Initials Name Provider Type    CT Bhargavi Vásquez PT Physical Therapist                  PT Recommendation and Plan  Anticipated Discharge Disposition (PT): home with /7 care, home with home health  Planned Therapy Interventions (PT): balance training, bed mobility training, gait training, home exercise program, manual therapy techniques, motor coordination training, neuromuscular re-education, patient/family education, postural re-education, strengthening, transfer training  Therapy Frequency (PT): 5 times/wk (5-6 times/wk)  Plan of Care Reviewed With: patient  Progress: improving       Time Calculation:    PT Charges     Row Name 10/12/22 1441 10/12/22 1440          Time Calculation    PT Received On 10/12/22  -CT 10/12/22  -CT        Time Calculation- PT    Total Timed Code Minutes- PT 34 minute(s)  second treatment session  -CT 40 minute(s)  first treatment session  -CT           User Key  (r) = Recorded By, (t) = Taken By, (c) = Cosigned By    Initials Name Provider Type    CT Bhargavi Vásquez PT Physical Therapist              Therapy Charges for Today     Code Description Service Date Service Provider Modifiers Qty    62057389492 HC GAIT TRAINING EA 15 MIN 10/11/2022 Bhargavi Vásquez, PT GP 1    63675970789 HC PT THERAPEUTIC ACT EA 15 MIN 10/11/2022 Bhargavi Vásquez, PT GP 1    90371711758 HC PT THER PROC EA 15 MIN 10/11/2022 Bhargavi Vásquez, PT GP 1    69587096580 HC GAIT TRAINING EA 15 MIN 10/12/2022 Bhargavi Vásquez, PT GP 1    00006413628 HC PT THERAPEUTIC ACT EA 15 MIN 10/12/2022 Bhargavi Vásquez, PT GP 1    40027106015 HC PT THER PROC EA 15 MIN 10/12/2022 Bhargavi Vásquez, PT GP 1    97138702198 HC GAIT TRAINING EA 15 MIN 10/12/2022 Bhargavi Vásquez, PT GP 1    35365166827 HC PT THERAPEUTIC ACT EA 15 MIN 10/12/2022 Bhargavi Vásquez, PT GP 1          PT G-Codes  Outcome Measure  Options: AM-PAC 6 Clicks Daily Activity (OT)  AM-PAC 6 Clicks Score (PT): 11  AM-PAC 6 Clicks Score (OT): 13    Bhargavi Vásquez, PT  10/12/2022

## 2022-10-12 NOTE — PROGRESS NOTES
Pt admitted to swing bed on 10/5 for additional therapy in the setting of generalized weakness/debility. Pt not seen or examined today. I have reviewed her VS and labs. I have discussed her care with SONAM Tenorio who does not report any acute events or issues that need to be addressed. BP stable after hydralazine recently decreased. Renal function stable on AM labs so will continue current diuretic regimen. K+ improved with supplementation. Order x-rays and venous duplex US of LLE 2/2 reported pain. Cont PT/OT. Discussed with palliative care. Pt not progressing well with therapy and family unsure if they will be able to provide adequate care at home in her current condition. May need to consider long term SNF placement if she does not show significant improvement. Repeat labs in the AM.       Mustapha Loredo DO  10/12/22  15:52 EDT

## 2022-10-12 NOTE — PLAN OF CARE
Goal Outcome Evaluation:           Progress: no change  Outcome Evaluation: Pt rested well throughout the day. Pt sat in bedside chair today. No complaints or acute changes at this time. VSS. Will continue plan of care.

## 2022-10-12 NOTE — CASE MANAGEMENT/SOCIAL WORK
Discharge Planning Assessment  [facility]     Patient Name: Tre Kinney  MRN: [id number]  Today's Date: 10/12/2022    Admit Date: 10/5/2022    Plan: Pt was admitted to swing bed on 10/5/22. Pt was approved through 10/14 with clincial reviews due. Pt lives with her son, Valdez and she plans to return home at discharge. Pt did not utilize home health services prior to admission. Pt has O2 PRN, wheelchair, rolling walker and bedside commode via [facility]. PCP is Jessica Pedro. Pt did not have a POA or living will. SS to follow and assist as needed with discharge planning.     Discharge Plan     Row Name 10/12/22 1349       Plan    Plan Pt was admitted to swing bed on 10/5/22. Pt was approved through 10/14 with clincial reviews due. Pt lives with her son, Valdez and she plans to return home at discharge. Pt did not utilize home health services prior to admission. Pt has O2 PRN, wheelchair, rolling walker and bedside commode via [facility]. PCP is Jessica Pedro. Pt did not have a POA or living will. SS to follow and assist as needed with discharge planning.                XANDER Craig

## 2022-10-12 NOTE — PROGRESS NOTES
"Palliative Care Daily Progress Note     S: Medical record reviewed, followed up with Primary RN Coby and Dr Loredo regarding patient's condition. When palliative entered the room the pt was asleep but did wake up easily to have a conversation. When I asked her if she was in pain, she said no, I specifically asked her about her left leg she stated that it wasn't that it hurt , it was that it was weak and she did not feel like she could put all her weight on it. She denied nausea, anxiety, shortness of breath at this time. We discussed physical therapy and while we understand she is trying, she has not made any improvement. She understandably was frustrated by this.      O:   Palliative Performance Scale Score:     /54   Pulse 75   Temp 98.8 °F (37.1 °C) (Oral)   Resp 18   Ht 170.2 cm (67.01\")   Wt 71.8 kg (158 lb 3.2 oz)   SpO2 95%   BMI 24.77 kg/m²     Intake/Output Summary (Last 24 hours) at 10/12/2022 1655  Last data filed at 10/12/2022 1500  Gross per 24 hour   Intake --   Output 1200 ml   Net -1200 ml       PE:  General Appearance:    Pt appears weak, alert, cooperative, NAD   HEENT:    NC/AT, without obvious abnormality, EOMI, anicteric, nasal congestion present   Neck:   supple, trachea midline, no JVD   Lungs:     Regular even respirations, CTA, on room air, no distress    Heart:    RRR, normal S1 and S2, no M/R/G   Abdomen:     Soft, NT, ND, NABS    Extremities:   Moves all extremities, no edema   Pulses:   Pulses palpable and equal bilaterally   Skin:   Warm, dry   Neurologic:   A/Ox3, cooperative, weakness   Psych:   Calm, appropriate           Meds: Reviewed and changes noted.    Labs:   Results from last 7 days   Lab Units 10/10/22  0246   WBC 10*3/mm3 6.70   HEMOGLOBIN g/dL 10.3*   HEMATOCRIT % 31.6*   PLATELETS 10*3/mm3 201     Results from last 7 days   Lab Units 10/12/22  0050   SODIUM mmol/L 137   POTASSIUM mmol/L 4.3   CHLORIDE mmol/L 100   CO2 mmol/L 25.7   BUN mg/dL 39* "   CREATININE mg/dL 2.14*   GLUCOSE mg/dL 113*   CALCIUM mg/dL 9.1     Results from last 7 days   Lab Units 10/12/22  0050   SODIUM mmol/L 137   POTASSIUM mmol/L 4.3   CHLORIDE mmol/L 100   CO2 mmol/L 25.7   BUN mg/dL 39*   CREATININE mg/dL 2.14*   CALCIUM mg/dL 9.1   GLUCOSE mg/dL 113*     Imaging Results (Last 72 Hours)     ** No results found for the last 72 hours. **            Diagnostics: Reviewed    A: Tre Kinney is a 89 y.o. female admitted on 10/5/2022 to [facility] for physical therapy for ten days. She was originally admitted to the hospital on 9/19/2022 due to a fall and symptomatic anemia. She has a medical history CHF, chronic kidney disease, coronary artery disease s/p stenting, history of blood clots, chronic anticoagulation with Coumadin, SSS s/p pacemaker placement, chronic diastolic CHF, and iron deficiency anemia who presents today with a chief complaint of a fall. Per pt she has become progressively more weak and has experienced several falls. Pt has sustained a moderate acute superior endplate vertebral compression fracture at the L1 with about 40% loss of vertebral body height and mild posterior osseous retropulsion of about 6 mm and also noted to have chronic compression fracture deformities involving the inferior endplates of L3 and L4 due to her first fall and from her second fall she does have a large hematoma on her right side, which led her to this admission. Pt had a hemoglobin of 6.1 for which she received 2 units of PRBC's. Pt denies any known source of bleeding and does say she had a scope(EGD colonoscopy at [facility] in 2018.  Today 10/12/2022  HR 75, RR 16 with BP of 119/54, pt was afebrile and was in no distress at this time.    P:  Palliative was able to touch base with son Leno to update and provide support. We discussed our conversation with Tre, we discussed that she was not happy about discussing the possibility of needed permanent nursing home placement due to her lack of  improvement with PT. We also discussed his concern regarding her c/o pain and weakness in left ankle, left knee and I let him know that Dr Loredo is aware and stated he would order some x-rays. I will continue to support and encourage pt however she will need to put forth effort to work with PT on her own.    We will continue to follow along. Please do not hesitate to contact us regarding further sx mgmt or GOC needs, including after hours or on weekends via our on call provider at [phone].     Liyah Jaquez, APRN    10/12/2022

## 2022-10-12 NOTE — THERAPY TREATMENT NOTE
Patient Name: Tre Kinney  : 1932    MRN: [id number]                              Today's Date: 10/12/2022       Admit Date: 10/5/2022    Visit Dx: No diagnosis found.  Patient Active Problem List   Diagnosis   • Acid reflux   • Ankle arthralgia   • Cardiac conduction disorder   • Closed fracture of distal fibula   • Arteriosclerosis of coronary artery   • Fracture of distal end of tibia   • Elevated cholesterol   • BP (high blood pressure)   • Hypertrophic polyarthritis   • OP (osteoporosis)   • Right heart failure (HCC)   • Heart failure, chronic, right-sided (HCC)   • SOB (shortness of breath)   • Palpitations   • CHF (congestive heart failure) (HCC)   • Congestive heart failure (HCC)   • Precordial pain   • Chronic anticoagulation   • Hypokalemia   • Anemia, unspecified type   • Generalized weakness     Past Medical History:   Diagnosis Date   • Anemia    • Arthritis    • CHF (congestive heart failure) (HCC)    • Chronic kidney disease    • Coronary artery disease    • H/O blood clots    • History of transfusion     no reaction    • Hypertension      Past Surgical History:   Procedure Laterality Date   • ABDOMINAL SURGERY     • APPENDECTOMY     • CARDIAC SURGERY     •  SECTION     • COLONOSCOPY N/A 3/7/2018    Procedure: COLONOSCOPY;  Surgeon: Willie Gresham MD;  Location:  [facility];  Service:    • CORONARY ANGIOPLASTY WITH STENT PLACEMENT     • ENDOSCOPY N/A 3/6/2018    Procedure: ESOPHAGOGASTRODUODENOSCOPY;  Surgeon: Willie Gresham MD;  Location:  [facility];  Service:    • PACEMAKER IMPLANTATION      left side    • SKIN BIOPSY     • TUBAL ABDOMINAL LIGATION        General Information     Row Name 10/12/22 1356          OT Time and Intention    Document Type therapy note (daily note)  -     Mode of Treatment individual therapy;occupational therapy  -     Row Name 10/12/22 0991          General Information    Patient Profile Reviewed yes  -     Existing  Precautions/Restrictions fall;TLSO  -     Barriers to Rehab none identified  -     Row Name 10/12/22 [facility]          Cognition    Orientation Status (Cognition) oriented x 3  -     Row Name 10/12/22 [facility]          Safety Issues, Functional Mobility    Impairments Affecting Function (Mobility) balance;strength;endurance/activity tolerance  -           User Key  (r) = Recorded By, (t) = Taken By, (c) = Cosigned By    Initials Name Provider Type     Linn Quevedo OT Occupational Therapist                 Mobility/ADL's     Row Name 10/12/22 [facility]          Bed Mobility    Bed Mobility supine-sit-supine  -     Supine-Sit-Supine Bremer (Bed Mobility) moderate assist (50% patient effort)  -     Bed Mobility, Safety Issues decreased use of arms for pushing/pulling;decreased use of legs for bridging/pushing;impaired trunk control for bed mobility  -     Assistive Device (Bed Mobility) bed rails;draw sheet;head of bed elevated  -     Row Name 10/12/22 [facility]          Transfers    Transfers sit-stand transfer;stand-sit transfer;bed-chair transfer  -     Row Name 10/12/22 [facility]          Bed-Chair Transfer    Bed-Chair Bremer (Transfers) maximum assist (25% patient effort);2 person assist  -     Assistive Device (Bed-Chair Transfers) walker, front-wheeled  -     Row Name 10/12/22 [facility]          Sit-Stand Transfer    Sit-Stand Bremer (Transfers) minimum assist (75% patient effort);moderate assist (50% patient effort);2 person assist  -     Assistive Device (Sit-Stand Transfers) walker, front-wheeled  -     Row Name 10/12/22 [facility]          Stand-Sit Transfer    Stand-Sit Bremer (Transfers) minimum assist (75% patient effort);moderate assist (50% patient effort);2 person assist  -     Assistive Device (Stand-Sit Transfers) walker, front-wheeled  -     Row Name 10/12/22 [facility]          Activities of Daily Living    BADL Assessment/Intervention grooming  -     Row Name 10/12/22 [facility]           Grooming Assessment/Training    [place] Level (Grooming) grooming skills;hair care, combing/brushing;wash face, hands;minimum assist (75% patient effort)  -     Position (Grooming) supported sitting  -           User Key  (r) = Recorded By, (t) = Taken By, (c) = Cosigned By    Initials Name Provider Type    Linn Richardson OT Occupational Therapist               Obj/Interventions     Row Name 10/12/22 1358          Motor Skills    Motor Skills functional endurance  -KP     Functional Endurance fair  -     Therapeutic Exercise --  BUE AROM through functional movement patterns X 20 reps X2 sets in supported sitting  -KP           User Key  (r) = Recorded By, (t) = Taken By, (c) = Cosigned By    Initials Name Provider Type    Linn Richardson OT Occupational Therapist               Goals/Plan    No documentation.                Clinical Impression     Row Name 10/12/22 1400          Pain Assessment    Pretreatment Pain Rating 0/10 - no pain  -KP     Posttreatment Pain Rating 0/10 - no pain  -KP     Row Name 10/12/22 1400          Plan of Care Review    Plan of Care Reviewed With patient  -     Progress improving  -     Outcome Evaluation Patient seen for OT treatment session on this date. She tolerated transfer to and from chair with maximal assist X2 with rolling walker. While she was up in chair she was able to complete grooming tasks with minimal assist after set-up. Patient performed BUE TE AROM exercises up in chair as well with fair endurance/activity tolerance. Patient with limited functional endurance/activity tolerance and poor standing tolerance/balance impacting performance with daily occupations. Continue OT plan of care.  -     Row Name 10/12/22 1400          Positioning and Restraints    Pre-Treatment Position in bed  -KP     Post Treatment Position bed  -KP     In Bed call light within reach  -KP           User Key  (r) = Recorded By, (t) = Taken By, (c) = Cosigned By     Initials Name Provider Type    Linn Richardson OT Occupational Therapist               Outcome Measures     Row Name 10/12/22 1404          How much help from another is currently needed...    Putting on and taking off regular lower body clothing? 1  -KP     Bathing (including washing, rinsing, and drying) 2  -KP     Toileting (which includes using toilet bed pan or urinal) 2  -KP     Putting on and taking off regular upper body clothing 2  -KP     Taking care of personal grooming (such as brushing teeth) 3  -KP     Eating meals 3  -     AM-PAC 6 Clicks Score (OT) 13  -     Row Name 10/12/22 0840          How much help from another person do you currently need...    Turning from your back to your side while in flat bed without using bedrails? 3  -     Moving from lying on back to sitting on the side of a flat bed without bedrails? 2  -DH     Moving to and from a bed to a chair (including a wheelchair)? 2  -DH     Standing up from a chair using your arms (e.g., wheelchair, bedside chair)? 2  -DH     Climbing 3-5 steps with a railing? 1  -DH     To walk in hospital room? 1  -     AM-PAC 6 Clicks Score (PT) 11  -     Highest level of mobility 4 --> Transferred to chair/commode  -     Row Name 10/12/22 3403          Functional Assessment    Outcome Measure Options AM-PAC 6 Clicks Daily Activity (OT)  -           User Key  (r) = Recorded By, (t) = Taken By, (c) = Cosigned By    Initials Name Provider Type    Linn Richardson OT Occupational Therapist     Coby Edmond RN Registered Nurse                  OT Recommendation and Plan     Plan of Care Review  Plan of Care Reviewed With: patient  Progress: improving  Outcome Evaluation: Patient seen for OT treatment session on this date. She tolerated transfer to and from chair with maximal assist X2 with rolling walker. While she was up in chair she was able to complete grooming tasks with minimal assist after set-up. Patient performed BUE TE AROM  exercises up in chair as well with fair endurance/activity tolerance. Patient with limited functional endurance/activity tolerance and poor standing tolerance/balance impacting performance with daily occupations. Continue OT plan of care.     Time Calculation:    Time Calculation- OT     Row Name 10/12/22 1405             Time Calculation- OT    OT Received On 10/12/22  -KP         Timed Charges    65369 - OT Therapeutic Exercise Minutes 15  -KP      29292 - OT Therapeutic Activity Minutes 16  -KP         Total Minutes    Timed Charges Total Minutes 31  -KP       Total Minutes 31  -KP            User Key  (r) = Recorded By, (t) = Taken By, (c) = Cosigned By    Initials Name Provider Type     Linn Quevedo OT Occupational Therapist              Therapy Charges for Today     Code Description Service Date Service Provider Modifiers Qty    73002983621 HC OT THER PROC EA 15 MIN 10/11/2022 Linn Quevedo OT GO 1    96411569224 HC OT THER PROC EA 15 MIN 10/12/2022 Linn Quevedo OT GO 1    76604106144 HC OT THERAPEUTIC ACT EA 15 MIN 10/12/2022 Linn Quevedo OT GO 1               Linn Quevedo OT  10/12/2022

## 2022-10-12 NOTE — PLAN OF CARE
Goal Outcome Evaluation:               Pt resting in bed at time of assessment. Did not ambulate with staff this shift. Cath care completed per order. Potassium came up to 4.3 after replacement on dayshift. PT has no c/o or s/sx of discomfort, VSS, will continue to monitor for changes.

## 2022-10-13 LAB
ANION GAP SERPL CALCULATED.3IONS-SCNC: 11.8 MMOL/L (ref 5–15)
BUN SERPL-MCNC: 35 MG/DL (ref 8–23)
BUN/CREAT SERPL: 16.7 (ref 7–25)
CALCIUM SPEC-SCNC: 8.8 MG/DL (ref 8.6–10.5)
CHLORIDE SERPL-SCNC: 97 MMOL/L (ref 98–107)
CO2 SERPL-SCNC: 26.2 MMOL/L (ref 22–29)
CREAT SERPL-MCNC: 2.1 MG/DL (ref 0.57–1)
EGFRCR SERPLBLD CKD-EPI 2021: 22.2 ML/MIN/1.73
GLUCOSE SERPL-MCNC: 103 MG/DL (ref 65–99)
POTASSIUM SERPL-SCNC: 4 MMOL/L (ref 3.5–5.2)
SODIUM SERPL-SCNC: 135 MMOL/L (ref 136–145)

## 2022-10-13 PROCEDURE — 94761 N-INVAS EAR/PLS OXIMETRY MLT: CPT

## 2022-10-13 PROCEDURE — 97110 THERAPEUTIC EXERCISES: CPT

## 2022-10-13 PROCEDURE — 94799 UNLISTED PULMONARY SVC/PX: CPT

## 2022-10-13 PROCEDURE — 97535 SELF CARE MNGMENT TRAINING: CPT

## 2022-10-13 PROCEDURE — 80048 BASIC METABOLIC PNL TOTAL CA: CPT | Performed by: INTERNAL MEDICINE

## 2022-10-13 PROCEDURE — 25010000002 ENOXAPARIN PER 10 MG: Performed by: INTERNAL MEDICINE

## 2022-10-13 PROCEDURE — 97116 GAIT TRAINING THERAPY: CPT

## 2022-10-13 PROCEDURE — 97530 THERAPEUTIC ACTIVITIES: CPT

## 2022-10-13 RX ADMIN — PANTOPRAZOLE SODIUM 40 MG: 40 TABLET, DELAYED RELEASE ORAL at 05:19

## 2022-10-13 RX ADMIN — ENOXAPARIN SODIUM 70 MG: 80 INJECTION SUBCUTANEOUS at 20:46

## 2022-10-13 RX ADMIN — ESCITALOPRAM 10 MG: 10 TABLET, FILM COATED ORAL at 09:13

## 2022-10-13 RX ADMIN — DOCUSATE SODIUM 100 MG: 100 CAPSULE ORAL at 20:47

## 2022-10-13 RX ADMIN — FUROSEMIDE 40 MG: 40 TABLET ORAL at 18:04

## 2022-10-13 RX ADMIN — FUROSEMIDE 40 MG: 40 TABLET ORAL at 09:13

## 2022-10-13 RX ADMIN — HYDRALAZINE HYDROCHLORIDE 25 MG: 25 TABLET, FILM COATED ORAL at 20:48

## 2022-10-13 RX ADMIN — Medication 10 ML: at 09:17

## 2022-10-13 RX ADMIN — Medication 10 ML: at 21:01

## 2022-10-13 RX ADMIN — PHENOL 1 SPRAY: 1.4 SPRAY ORAL at 20:56

## 2022-10-13 RX ADMIN — Medication 10 MG: at 20:48

## 2022-10-13 RX ADMIN — Medication 10 ML: at 20:47

## 2022-10-13 RX ADMIN — HYDRALAZINE HYDROCHLORIDE 25 MG: 25 TABLET, FILM COATED ORAL at 05:19

## 2022-10-13 RX ADMIN — METOPROLOL TARTRATE 12.5 MG: 25 TABLET, FILM COATED ORAL at 09:14

## 2022-10-13 RX ADMIN — METOPROLOL TARTRATE 12.5 MG: 25 TABLET, FILM COATED ORAL at 20:47

## 2022-10-13 RX ADMIN — GABAPENTIN 300 MG: 300 CAPSULE ORAL at 20:47

## 2022-10-13 RX ADMIN — HYDRALAZINE HYDROCHLORIDE 25 MG: 25 TABLET, FILM COATED ORAL at 14:33

## 2022-10-13 NOTE — PROGRESS NOTES
[facility] HOSPITALIST PROGRESS NOTE    Subjective     History:   Tre Kinney is a 89 y.o. female admitted on 10/5/2022 secondary to <principal problem not specified>     Procedures: None    CC: Follow up debility    Reason for follow up swing bed visit: Newly diagnosed DVT    Patient seen and examined with SONAM Sanches. Awake and alert. Continues to report generalized weakness. Reports left leg pain. No reported CP or dyspnea. No reported nausea or vomiting. No acute events overnight per RN.     History taken from: patient, chart, and RN.      Objective     Vital Signs  Temp:  [98.1 °F (36.7 °C)-98.2 °F (36.8 °C)] 98.1 °F (36.7 °C)  Heart Rate:  [72-77] 72  Resp:  [16-18] 18  BP: (115-135)/(54-73) 135/73    Intake/Output Summary (Last 24 hours) at 10/13/2022 1515  Last data filed at 10/13/2022 0520  Gross per 24 hour   Intake 60 ml   Output 450 ml   Net -390 ml         Physical Exam:  General:    Awake, alert, in no acute distress, chronically ill appearing   Heart:      Normal S1 and S2. Regular rate and rhythm. No significant murmur, rubs or gallops appreciated.   Lungs:     Respirations regular, even and unlabored. Lungs clear to auscultation B/L. No wheezes, rales or rhonchi.   Abdomen:   Soft and nontender. No guarding, rebound tenderness or  organomegaly noted. Bowel sounds present x 4.   Extremities:  No lower extremity edema. Moves UE and LE equally B/L.     Results Review:    Results from last 7 days   Lab Units 10/10/22  0246 10/07/22  0047   WBC 10*3/mm3 6.70 7.41   HEMOGLOBIN g/dL 10.3* 10.4*   PLATELETS 10*3/mm3 201 250     Results from last 7 days   Lab Units 10/13/22  0128 10/12/22  0050 10/11/22  0304 10/10/22  0246 10/07/22  0047   SODIUM mmol/L 135* 137 136 138 136   POTASSIUM mmol/L 4.0 4.3 3.6 3.3* 3.9   CHLORIDE mmol/L 97* 100 98 99 97*   CO2 mmol/L 26.2 25.7 27.0 25.5 19.3*   BUN mg/dL 35* 39* 36* 39* 31*   CREATININE mg/dL 2.10* 2.14* 2.11* 2.40* 2.17*   CALCIUM mg/dL 8.8  9.1 9.0 9.0 9.4   GLUCOSE mg/dL 103* 113* 104* 116* 91         Results from last 7 days   Lab Units 10/11/22  0304 10/10/22  0246 10/07/22  0047   MAGNESIUM mg/dL 2.2 1.9 2.0         Results from last 7 days   Lab Units 10/10/22  0246 10/09/22  1329   TROPONIN T ng/mL 0.058* 0.063*       Imaging Results (Last 24 Hours)     Procedure Component Value Units Date/Time    XR Ankle 3+ View Left [[id number]] Collected: 10/12/22 1848     Updated: 10/12/22 1903    Narrative:      EXAM:    XR Left Ankle Complete, 3 or More Views     CLINICAL HISTORY:    Pain     TECHNIQUE:    Frontal, lateral and oblique views of the left ankle.     COMPARISON:    No relevant prior studies available.     FINDINGS:    Bones/joints:  Unremarkable.  No acute fracture.  No dislocation.    Soft tissues:  Unremarkable.       Impression:        No acute findings in the left ankle.     This report was finalized on 10/12/2022 6:49 PM by Dr. Bob Lovell MD.       XR Foot 2 View Left [[id number]] Collected: 10/12/22 1849     Updated: 10/12/22 1902    Narrative:      EXAM:    XR Left Foot, 2 Views     CLINICAL HISTORY:    Pain     TECHNIQUE:    Frontal and lateral views of the left foot.     COMPARISON:    No relevant prior studies available.     FINDINGS:    Bones/joints:  Unremarkable.  No acute fracture.  No dislocation.    Soft tissues:  Unremarkable.  No radiopaque foreign body.       Impression:        No acute findings in the left foot.     This report was finalized on 10/12/2022 6:49 PM by Dr. Bob Lovell MD.       US Venous Doppler Lower Extremity Left (duplex) [[id number]] Collected: 10/12/22 1828     Updated: 10/12/22 1902    Narrative:      EXAMINATION: US VENOUS DOPPLER LOWER EXTREMITY LEFT (DUPLEX)-      CLINICAL INDICATION: Pain        COMPARISON: None available     FINDINGS:  Color Doppler imaging with compression and augmentation to evaluate the  left lower extremity deep venous system     FINDINGS:  There is diffuse internal clot and  noncompressibility throughout the  left lower extremity.       Impression:      Diffuse DVT in the left lower extremity     Results have been relayed to the referring physician      This report was finalized on 10/12/2022 6:30 PM by Dr. Bob Lovell MD.       XR Knee 1 or 2 View Left [[id number]] Collected: 10/12/22 1850     Updated: 10/12/22 1900    Narrative:      EXAM:    XR Left Knee, 1 or 2 Views     CLINICAL HISTORY:    Pain     TECHNIQUE:    Frontal and/or lateral views of the left knee.     COMPARISON:    06/12/2017     FINDINGS:    BONES/JOINTS:  Soft tissue calcification anterolateral adjacent to the  fibular head. No acute fracture.  No dislocation.    SOFT TISSUES:  Unremarkable.       Impression:        Soft tissue calcification anterolateral adjacent to the fibular head.  No acute fracture.     This report was finalized on 10/12/2022 6:51 PM by Dr. Bob Lovell MD.       XR Tibia Fibula 2 View Left [[id number]] Collected: 10/12/22 1849     Updated: 10/12/22 1900    Narrative:      EXAM:    XR Left Tibia and Fibula, 2 Views     CLINICAL HISTORY:    Pain     TECHNIQUE:    Frontal and lateral views of the left tibia and fibula.     COMPARISON:    No relevant prior studies available.     FINDINGS:    Bones/joints:  Soft tissue calcification anterolaterally, but no acute  fracture.  No dislocation.    Soft tissues:  Unremarkable.  No radiopaque foreign body.       Impression:        Soft tissue calcification anterolaterally, but no acute fracture.     This report was finalized on 10/12/2022 6:50 PM by Dr. Bob Lovell MD.               Medications:  docusate sodium, 100 mg, Oral, BID  enoxaparin, 1 mg/kg, Subcutaneous, Q24H  escitalopram, 10 mg, Oral, Daily  furosemide, 40 mg, Oral, BID  gabapentin, 300 mg, Oral, Nightly  hydrALAZINE, 25 mg, Oral, Q8H  melatonin, 10 mg, Oral, Nightly  metoprolol tartrate, 12.5 mg, Oral, Q12H  pantoprazole, 40 mg, Oral, Q AM  sodium chloride, 10 mL, Intravenous,  Q12H  sodium chloride, 10 mL, Intravenous, Q12H  [START ON 10/19/2022] tuberculin, 5 Units, Intradermal, Once               Assessment & Plan   Generalized weakness/debility  Diffuse LLE DVT  DIANA on CKD IV  Acute blood loss anemia  Traumatic right gluteal hematoma  Paroxysmal Afib  Hx of SSS s/p pacemaker placement  Essential HTN  Reported hx of DVT's   Anxiety/depression  Advanced age    Plan  In the setting of reported intermittent LLE pain obtained x-rays which do not reveal any evidence of acute fractures. However, venous duplex US of LLE reveals extensive DVT. Cont Lovenox per pharmacy dosing. Monitor closely in the setting of recent traumatic right gluteal hematoma. Antihypertensives previously reduced to avoid relative hypotension with BP stable today. Creatine remains stable today and will continue current diuretic regimen. Cont PT/OT as tolerated. Repeat labs in the AM.       Disposition: Possible home with family vs long term SNF placement pending progression with therapy.      Mustapha Loredo,   10/13/22  15:15 EDT

## 2022-10-13 NOTE — PLAN OF CARE
Goal Outcome Evaluation:  Plan of Care Reviewed With: patient        Progress: no change  Outcome Evaluation: pt has rested in bed, has had no complaints, got up to bedside chair with pt and sat in chair for two hours and tolerated well. No acute changes, will continue plan of care.

## 2022-10-13 NOTE — THERAPY TREATMENT NOTE
Acute Care - Occupational Therapy Treatment Note   [place]     Patient Name: Tre Kinney  : 1932  MRN: [id number]  Today's Date: 10/13/2022  Onset of Illness/Injury or Date of Surgery: 10/06/22     Referring Physician: Janneth    Admit Date: 10/5/2022     No diagnosis found.  Patient Active Problem List   Diagnosis   • Acid reflux   • Ankle arthralgia   • Cardiac conduction disorder   • Closed fracture of distal fibula   • Arteriosclerosis of coronary artery   • Fracture of distal end of tibia   • Elevated cholesterol   • BP (high blood pressure)   • Hypertrophic polyarthritis   • OP (osteoporosis)   • Right heart failure (HCC)   • Heart failure, chronic, right-sided (HCC)   • SOB (shortness of breath)   • Palpitations   • CHF (congestive heart failure) (HCC)   • Congestive heart failure (HCC)   • Precordial pain   • Chronic anticoagulation   • Hypokalemia   • Anemia, unspecified type   • Generalized weakness     Past Medical History:   Diagnosis Date   • Anemia    • Arthritis    • CHF (congestive heart failure) ([facility])    • Chronic kidney disease    • Coronary artery disease    • H/O blood clots    • History of transfusion     no reaction    • Hypertension      Past Surgical History:   Procedure Laterality Date   • ABDOMINAL SURGERY     • APPENDECTOMY     • CARDIAC SURGERY     •  SECTION     • COLONOSCOPY N/A 3/7/2018    Procedure: COLONOSCOPY;  Surgeon: Willie Gresham MD;  Location:  [facility];  Service:    • CORONARY ANGIOPLASTY WITH STENT PLACEMENT     • ENDOSCOPY N/A 3/6/2018    Procedure: ESOPHAGOGASTRODUODENOSCOPY;  Surgeon: Willie Gresham MD;  Location:  [facility];  Service:    • PACEMAKER IMPLANTATION      left side    • SKIN BIOPSY     • TUBAL ABDOMINAL LIGATION           OT ASSESSMENT FLOWSHEET (last 12 hours)     OT Evaluation and Treatment     Row Name 10/13/22 1219                   OT Time and Intention    Subjective Information complains of;weakness;fatigue  -LA         "Document Type therapy note (daily note)  -LA        Mode of Treatment occupational therapy  -LA        Patient Effort adequate  -LA        Symptoms Noted During/After Treatment fatigue  -LA           General Information    Patient Profile Reviewed yes  -LA        Patient/Family/Caregiver Comments/Observations Patient agreeable to therapy. Patient reports she hasn't ate anything this morning and was hungry. OT was going to call dietary and patient reported \"No, I'll just wait till lunch now.\"  -LA           Cognition    Affect/Mental Status (Cognition) WFL  -LA        Orientation Status (Cognition) oriented x 3  -LA        Follows Commands (Cognition) WFL  -LA           Grooming Assessment/Training    [place] Level (Grooming) minimum assist (75% patient effort);hair care, combing/brushing  -LA           Bed Mobility    Rolling Left [place] (Bed Mobility) moderate assist (50% patient effort)  -LA        Rolling Right [place] (Bed Mobility) moderate assist (50% patient effort)  -LA           Motor Skills    Motor Skills coordination;functional endurance  -LA        Coordination fine motor deficit;gross motor deficit;minimal impairment  -LA        Functional Endurance fair-  -LA        Therapeutic Exercise shoulder;elbow/forearm;wrist;hand  -LA        Additional Documentation --  Yellow theraband in all planes 10 x3 sets. Increased rest periods required for recovery  -LA           Plan of Care Review    Plan of Care Reviewed With patient  -LA        Progress no change  -LA           Positioning and Restraints    Pre-Treatment Position in bed  -LA        Post Treatment Position bed  -LA        In Bed call light within reach;encouraged to call for assist;exit alarm on;side lying left  -LA              User Key  (r) = Recorded By, (t) = Taken By, (c) = Cosigned By    Initials Name Effective Dates    Dianna Barreto OT 02/14/22 -                        OT Recommendation and Plan  Planned Therapy " Interventions (OT): activity tolerance training, patient/caregiver education/training, adaptive equipment training, ROM/therapeutic exercise, occupation/activity based interventions, BADL retraining, strengthening exercise, transfer/mobility retraining, functional balance retraining  Therapy Frequency (OT): 6 times/wk  Plan of Care Review  Plan of Care Reviewed With: patient  Progress: no change  Outcome Evaluation: OT evaluation completed this date. Patient engaged in UE tasks with focus on trunk control and fx reaching to promote increased independence with ADLs. Patient has had a significant decline in independence and safety with ADLs resulting in need for skilled OT services. OT to address deficits to allow patient to max level of independence prior to d/c. Patient admitted for swing bed and OT discussed activities. Patient given word search puzzles to promote increase activity. patient also reports she watches television. Family also stated they could bring in books for entertainment as needed.  Plan of Care Reviewed With: patient  Outcome Evaluation: OT evaluation completed this date. Patient engaged in UE tasks with focus on trunk control and fx reaching to promote increased independence with ADLs. Patient has had a significant decline in independence and safety with ADLs resulting in need for skilled OT services. OT to address deficits to allow patient to max level of independence prior to d/c. Patient admitted for swing bed and OT discussed activities. Patient given word search puzzles to promote increase activity. patient also reports she watches television. Family also stated they could bring in books for entertainment as needed.     Outcome Measures     Row Name 10/13/22 1200             How much help from another is currently needed...    Putting on and taking off regular lower body clothing? 1  -LA      Bathing (including washing, rinsing, and drying) 2  -LA      Toileting (which includes using toilet  bed pan or urinal) 2  -LA      Putting on and taking off regular upper body clothing 2  -LA      Taking care of personal grooming (such as brushing teeth) 3  -LA      Eating meals 3  -LA      AM-PAC 6 Clicks Score (OT) 13  -LA         Functional Assessment    Outcome Measure Options AM-PAC 6 Clicks Daily Activity (OT)  -LA            User Key  (r) = Recorded By, (t) = Taken By, (c) = Cosigned By    Initials Name Provider Type    Dianna Barreto OT Occupational Therapist                Time Calculation:    Time Calculation- OT     Row Name 10/13/22 1225             Time Calculation- OT    Total Timed Code Minutes- OT 30 minute(s)  -LA            User Key  (r) = Recorded By, (t) = Taken By, (c) = Cosigned By    Initials Name Provider Type    Dianna Barreto OT Occupational Therapist              Therapy Charges for Today     Code Description Service Date Service Provider Modifiers Qty    71465413711  OT THER PROC EA 15 MIN 10/13/2022 Dianna Siddiqi OT GO 1    [id number]  OT SELF CARE/MGMT/TRAIN EA 15 MIN 10/13/2022 Dianna Siddiqi OT GO 1               Dianna Siddiqi OT  10/13/2022

## 2022-10-13 NOTE — PROGRESS NOTES
Nutrition Services    Patient Name:  Tre Kinney  YOB: 1932  MRN: [id number]  Admit Date:  10/5/2022    BMI: 25.98  Diet: Soft Texture, Chopped, Thin   Supplement: Magic Cup BID  Intake: 31% avg over 12 meals  Fluid Intake:  Not meeting needs.     Electronically signed by:  Jose Oneal RD  10/13/22 08:31 EDT

## 2022-10-13 NOTE — THERAPY TREATMENT NOTE
Acute Care - Physical Therapy Treatment Note   [place]     Patient Name: Tre Kinney  : 1932  MRN: [id number]  Today's Date: 10/13/2022   Onset of Illness/Injury or Date of Surgery: 10/06/22  Visit Dx:   No diagnosis found.  Patient Active Problem List   Diagnosis   • Acid reflux   • Ankle arthralgia   • Cardiac conduction disorder   • Closed fracture of distal fibula   • Arteriosclerosis of coronary artery   • Fracture of distal end of tibia   • Elevated cholesterol   • BP (high blood pressure)   • Hypertrophic polyarthritis   • OP (osteoporosis)   • Right heart failure (HCC)   • Heart failure, chronic, right-sided (HCC)   • SOB (shortness of breath)   • Palpitations   • CHF (congestive heart failure) (HCC)   • Congestive heart failure (HCC)   • Precordial pain   • Chronic anticoagulation   • Hypokalemia   • Anemia, unspecified type   • Generalized weakness     Past Medical History:   Diagnosis Date   • Anemia    • Arthritis    • CHF (congestive heart failure) (HCC)    • Chronic kidney disease    • Coronary artery disease    • H/O blood clots    • History of transfusion     no reaction    • Hypertension      Past Surgical History:   Procedure Laterality Date   • ABDOMINAL SURGERY     • APPENDECTOMY     • CARDIAC SURGERY     •  SECTION     • COLONOSCOPY N/A 3/7/2018    Procedure: COLONOSCOPY;  Surgeon: Willie Gresham MD;  Location:  [facility];  Service:    • CORONARY ANGIOPLASTY WITH STENT PLACEMENT     • ENDOSCOPY N/A 3/6/2018    Procedure: ESOPHAGOGASTRODUODENOSCOPY;  Surgeon: Willie Gresham MD;  Location:  [facility];  Service:    • PACEMAKER IMPLANTATION      left side    • SKIN BIOPSY     • TUBAL ABDOMINAL LIGATION       PT Assessment (last 12 hours)     PT Evaluation and Treatment     Row Name 10/13/22 1454          Physical Therapy Time and Intention    Subjective Information complains of;weakness;fatigue  -CT     Document Type therapy note (daily note)  BID treatment sessions   -CT     Mode of Treatment physical therapy  -CT     Patient Effort adequate  -CT     Row Name 10/13/22 1452          General Information    Patient Profile Reviewed yes  -CT     Existing Precautions/Restrictions fall;TLSO  pt continues to decline TLSO use  -CT     Limitations/Impairments safety/cognitive  -CT     Row Name 10/13/22 1453          Home Use of Assistive/Adaptive Equipment    Equipment Currently Used at Home wheelchair;commode;walker, rolling;oxygen  -CT     Row Name 10/13/22 1450          Cognition    Affect/Mental Status (Cognition) WFL  -CT     Orientation Status (Cognition) oriented x 3  -CT     Follows Commands (Cognition) WFL  -CT     Row Name 10/13/22 1450          Bed Mobility    Bed Mobility sit-supine;supine-sit;scooting/bridging;rolling left;rolling right  -CT     Rolling Left [place] (Bed Mobility) minimum assist (75% patient effort)  -CT     Rolling Right [place] (Bed Mobility) minimum assist (75% patient effort)  -CT     Scooting/Bridging [place] (Bed Mobility) minimum assist (75% patient effort)  -CT     Supine-Sit [place] (Bed Mobility) moderate assist (50% patient effort)  -CT     Sit-Supine [place] (Bed Mobility) moderate assist (50% patient effort);2 person assist;verbal cues;nonverbal cues (demo/gesture)  -CT     Bed Mobility, Safety Issues decreased use of arms for pushing/pulling;decreased use of legs for bridging/pushing;impaired trunk control for bed mobility  -CT     Assistive Device (Bed Mobility) bed rails;draw sheet  -CT     Row Name 10/13/22 5004          Transfers    Transfers bed-chair transfer;chair-bed transfer  -CT     Row Name 10/13/22 1452          Bed-Chair Transfer    Bed-Chair [place] (Transfers) maximum assist (25% patient effort);dependent (less than 25% patient effort);2 person assist  -CT     Assistive Device (Bed-Chair Transfers) walker, front-wheeled  -CT     Row Name 10/13/22 5178          Chair-Bed Transfer    Chair-Bed  Ong (Transfers) maximum assist (25% patient effort);dependent (less than 25% patient effort);2 person assist  -CT     Assistive Device (Chair-Bed Transfers) walker, front-wheeled  -CT     Row Name 10/13/22 1454          Sit-Stand Transfer    Sit-Stand Ong (Transfers) moderate assist (50% patient effort);2 person assist;verbal cues  -CT     Assistive Device (Sit-Stand Transfers) walker, front-wheeled  -CT     Row Name 10/13/22 1454          Stand-Sit Transfer    Stand-Sit Ong (Transfers) moderate assist (50% patient effort);2 person assist;verbal cues  -CT     Assistive Device (Stand-Sit Transfers) walker, front-wheeled  -CT     Row Name 10/13/22 1454          Gait/Stairs (Locomotion)    O[place] Level (Gait) maximum assist (25% patient effort);2 person assist;verbal cues;nonverbal cues (demo/gesture)  -CT     Assistive Device (Gait) walker, front-wheeled  -CT     Distance in Feet (Gait) 3 fot during transfer to/from chair  -CT     Pattern (Gait) step-to  -CT     Deviations/Abnormal Patterns (Gait) gait speed decreased;festinating/shuffling;weight shifting decreased  -CT     Bilateral Gait Deviations weight shift ability decreased;forward flexed posture  -CT     Row Name 10/13/22 1454          Motor Skills    Therapeutic Exercise --  BLE sitting ther ex  -CT     Row Name 10/13/22 1456          Plan of Care Review    Plan of Care Reviewed With patient  -CT     Row Name 10/13/22 1454          Positioning and Restraints    Pre-Treatment Position in bed  -CT     In Bed call light within reach;encouraged to call for assist;exit alarm on;side lying left  -CT     In Chair sitting;call light within reach;encouraged to call for assist  -CT     Row Name 10/13/22 1451          Therapy Assessment/Plan (PT)    Rehab Potential (PT) fair, will monitor progress closely  -CT     Criteria for Skilled Interventions Met (PT) yes;meets criteria  -CT     Therapy Frequency (PT) 5 times/wk  5-6 times/wk  -CT            User Key  (r) = Recorded By, (t) = Taken By, (c) = Cosigned By    Initials Name Provider Type    CT Bhargavi Vásquez, PT Physical Therapist                  PT Recommendation and Plan  Anticipated Discharge Disposition (PT): home with /7 care, home with home health  Planned Therapy Interventions (PT): balance training, bed mobility training, gait training, home exercise program, manual therapy techniques, motor coordination training, neuromuscular re-education, patient/family education, postural re-education, strengthening, transfer training  Therapy Frequency (PT): 5 times/wk (5-6 times/wk)  Plan of Care Reviewed With: patient  Progress: improving   Outcome Measures     Row Name 10/13/22 1200             How much help from another is currently needed...    Putting on and taking off regular lower body clothing? 1  -LA      Bathing (including washing, rinsing, and drying) 2  -LA      Toileting (which includes using toilet bed pan or urinal) 2  -LA      Putting on and taking off regular upper body clothing 2  -LA      Taking care of personal grooming (such as brushing teeth) 3  -LA      Eating meals 3  -LA      AM-PAC 6 Clicks Score (OT) 13  -LA         Functional Assessment    Outcome Measure Options AM-PAC 6 Clicks Daily Activity (OT)  -LA            User Key  (r) = Recorded By, (t) = Taken By, (c) = Cosigned By    Initials Name Provider Type    Dianna Barreto OT Occupational Therapist                 Time Calculation:    PT Charges     Row Name 10/13/22 1966 10/13/22 145          Time Calculation    PT Received On 10/13/22  -CT 10/13/22  -CT        Time Calculation- PT    Total Timed Code Minutes- PT 25 minute(s)  second treatment session  -CT 42 minute(s)  first treatment session  -CT           User Key  (r) = Recorded By, (t) = Taken By, (c) = Cosigned By    Initials Name Provider Type    CT Bhargavi Vásquez, PT Physical Therapist              Therapy Charges for Today     Code Description Service  Date Service Provider Modifiers Qty    50605986417 HC GAIT TRAINING EA 15 MIN 10/12/2022 FahadBhargavi navarro, PT GP 1    05382367828 HC PT THERAPEUTIC ACT EA 15 MIN 10/12/2022 Bhargavi Vásquez, PT GP 1    84371715951 HC PT THER PROC EA 15 MIN 10/12/2022 Fahad Bhargavi, PT GP 1    90008372056 HC GAIT TRAINING EA 15 MIN 10/12/2022 Bhargavi Vásquez, PT GP 1    42550435931 HC PT THERAPEUTIC ACT EA 15 MIN 10/12/2022 Bhargavi Vásquez, PT GP 1    19107385166 HC GAIT TRAINING EA 15 MIN 10/13/2022 Bhargavi Vásquez, PT GP 1    12676825821 HC PT THER PROC EA 15 MIN 10/13/2022 Bhargavi Vásquez, PT GP 1    41513288276 HC PT THERAPEUTIC ACT EA 15 MIN 10/13/2022 Bhargavi Vásquez, PT GP 1    75220936400 HC PT THERAPEUTIC ACT EA 15 MIN 10/13/2022 Bhargavi Vásquez, PT GP 1    55276590158 HC GAIT TRAINING EA 15 MIN 10/13/2022 Bhargavi Vásquez, PT GP 1          PT G-Codes  Outcome Measure Options: AM-PAC 6 Clicks Daily Activity (OT)  AM-PAC 6 Clicks Score (PT): 11  AM-PAC 6 Clicks Score (OT): 13    Bhargavi Vásquez, PT  10/13/2022

## 2022-10-13 NOTE — PLAN OF CARE
Goal Outcome Evaluation:              Outcome Evaluation: Patient has rested in bed this shift, no complaints or request at this time, VSS, Will continue to monitor.

## 2022-10-13 NOTE — PROGRESS NOTES
"Palliative Care Daily Progress Note     S: Medical record reviewed, followed up with Dr Loredo regarding patient's condition.   When Palliative care entered the room the pt was sleeping but woke up when she heard my voice. She stated she was tired and that she hadn't eaten breakfast as it was not what she order, we offered to get her something but she was not interested. She denied pain at this time, when I asked about her left leg she said it occasionally ached.  She denied nausea, anxiety, shortness of breath just states she is tired.    O:   Palliative Performance Scale Score:     /73   Pulse 72   Temp 98.1 °F (36.7 °C) (Oral)   Resp 18   Ht 170.2 cm (67.01\")   Wt 75.3 kg (165 lb 14.4 oz)   SpO2 94%   BMI 25.98 kg/m²     Intake/Output Summary (Last 24 hours) at 10/13/2022 1745  Last data filed at 10/13/2022 1700  Gross per 24 hour   Intake 60 ml   Output 925 ml   Net -865 ml       PE:  General Appearance:    Pt appears weak, alert, cooperative, NAD   HEENT:    NC/AT, without obvious abnormality, EOMI, anicteric, nasal congestion present, however imptoved   Neck:   supple, trachea midline, no JVD   Lungs:     Regular even respirations, CTA, on room air, no distress    Heart:    RRR, normal S1 and S2, no M/R/G   Abdomen:     Soft, NT, ND, NABS    Extremities:   Moves all extremities weakly, no edema   Pulses:   Pulses palpable and equal bilaterally   Skin:   Warm, dry   Neurologic:   A/Ox3, cooperative   Psych:   Calm, appropriate                 Meds: Reviewed and changes noted.    Labs:   Results from last 7 days   Lab Units 10/10/22  0246   WBC 10*3/mm3 6.70   HEMOGLOBIN g/dL 10.3*   HEMATOCRIT % 31.6*   PLATELETS 10*3/mm3 201     Results from last 7 days   Lab Units 10/13/22  0128   SODIUM mmol/L 135*   POTASSIUM mmol/L 4.0   CHLORIDE mmol/L 97*   CO2 mmol/L 26.2   BUN mg/dL 35*   CREATININE mg/dL 2.10*   GLUCOSE mg/dL 103*   CALCIUM mg/dL 8.8     Results from last 7 days   Lab Units " 10/13/22  0128   SODIUM mmol/L 135*   POTASSIUM mmol/L 4.0   CHLORIDE mmol/L 97*   CO2 mmol/L 26.2   BUN mg/dL 35*   CREATININE mg/dL 2.10*   CALCIUM mg/dL 8.8   GLUCOSE mg/dL 103*     Imaging Results (Last 72 Hours)     Procedure Component Value Units Date/Time    XR Ankle 3+ View Left [[id number]] Collected: 10/12/22 1848     Updated: 10/12/22 1903    Narrative:      EXAM:    XR Left Ankle Complete, 3 or More Views     CLINICAL HISTORY:    Pain     TECHNIQUE:    Frontal, lateral and oblique views of the left ankle.     COMPARISON:    No relevant prior studies available.     FINDINGS:    Bones/joints:  Unremarkable.  No acute fracture.  No dislocation.    Soft tissues:  Unremarkable.       Impression:        No acute findings in the left ankle.     This report was finalized on 10/12/2022 6:49 PM by Dr. Bob Lovell MD.       XR Foot 2 View Left [[id number]] Collected: 10/12/22 1849     Updated: 10/12/22 1902    Narrative:      EXAM:    XR Left Foot, 2 Views     CLINICAL HISTORY:    Pain     TECHNIQUE:    Frontal and lateral views of the left foot.     COMPARISON:    No relevant prior studies available.     FINDINGS:    Bones/joints:  Unremarkable.  No acute fracture.  No dislocation.    Soft tissues:  Unremarkable.  No radiopaque foreign body.       Impression:        No acute findings in the left foot.     This report was finalized on 10/12/2022 6:49 PM by Dr. Bob Lovell MD.       US Venous Doppler Lower Extremity Left (duplex) [[id number]] Collected: 10/12/22 1828     Updated: 10/12/22 1902    Narrative:      EXAMINATION: US VENOUS DOPPLER LOWER EXTREMITY LEFT (DUPLEX)-      CLINICAL INDICATION: Pain        COMPARISON: None available     FINDINGS:  Color Doppler imaging with compression and augmentation to evaluate the  left lower extremity deep venous system     FINDINGS:  There is diffuse internal clot and noncompressibility throughout the  left lower extremity.       Impression:      Diffuse DVT in the left  lower extremity     Results have been relayed to the referring physician      This report was finalized on 10/12/2022 6:30 PM by Dr. Bob Lovell MD.       XR Knee 1 or 2 View Left [[id number]] Collected: 10/12/22 1850     Updated: 10/12/22 1900    Narrative:      EXAM:    XR Left Knee, 1 or 2 Views     CLINICAL HISTORY:    Pain     TECHNIQUE:    Frontal and/or lateral views of the left knee.     COMPARISON:    06/12/2017     FINDINGS:    BONES/JOINTS:  Soft tissue calcification anterolateral adjacent to the  fibular head. No acute fracture.  No dislocation.    SOFT TISSUES:  Unremarkable.       Impression:        Soft tissue calcification anterolateral adjacent to the fibular head.  No acute fracture.     This report was finalized on 10/12/2022 6:51 PM by Dr. Bob Lovell MD.       XR Tibia Fibula 2 View Left [[id number]] Collected: 10/12/22 1849     Updated: 10/12/22 1900    Narrative:      EXAM:    XR Left Tibia and Fibula, 2 Views     CLINICAL HISTORY:    Pain     TECHNIQUE:    Frontal and lateral views of the left tibia and fibula.     COMPARISON:    No relevant prior studies available.     FINDINGS:    Bones/joints:  Soft tissue calcification anterolaterally, but no acute  fracture.  No dislocation.    Soft tissues:  Unremarkable.  No radiopaque foreign body.       Impression:        Soft tissue calcification anterolaterally, but no acute fracture.     This report was finalized on 10/12/2022 6:50 PM by Dr. Bob Lovell MD.               Diagnostics: Reviewed    A: Tre Kinney is a 89 y.o.  female admitted on 10/5/2022 to [facility] bed for physical therapy for ten days. She was originally admitted to the hospital on 9/19/2022 due to a fall and symptomatic anemia. She has a medical history CHF, chronic kidney disease, coronary artery disease s/p stenting, history of blood clots, chronic anticoagulation with Coumadin, SSS s/p pacemaker placement, chronic diastolic CHF, and iron deficiency anemia who presents today  with a chief complaint of a fall. Per pt she has become progressively more weak and has experienced several falls. Pt has sustained a moderate acute superior endplate vertebral compression fracture at the L1 with about 40% loss of vertebral body height and mild posterior osseous retropulsion of about 6 mm and also noted to have chronic compression fracture deformities involving the inferior endplates of L3 and L4 due to her first fall and from her second fall she does have a large hematoma on her right side, which led her to this admission. Pt had a hemoglobin of 6.1 for which she received 2 units of PRBC's. Pt denies any known source of bleeding and does say she had a scope(EGD colonoscopy at [facility] in 2018.  Today 10/13/2022  Afebrile HR 72, RR 18, /73 and was saturating 94% on room air.    P:  Pts son had left message on [employer] answering machine. I returned his call and let him know results of x-rays and ultrasound dopplers. I let him know that the dopplers found her to have diffuse DVT in the left lower extremity and that x-ray shoed no acute fracture or abnormal findings. We did discuss that she could still work with physical therapy. Leno as well as myself are still concerned that she will be able to get strong enough to return home. Plan is either to return home with family or a long term nursing home placement.    We will continue to follow along. Please do not hesitate to contact us regarding further sx mgmt or GOC needs, including after hours or on weekends via our on call provider at [phone].     Liyah Jaquez, APRN    10/13/2022

## 2022-10-14 LAB
ANION GAP SERPL CALCULATED.3IONS-SCNC: 11.5 MMOL/L (ref 5–15)
BASOPHILS # BLD AUTO: 0.06 10*3/MM3 (ref 0–0.2)
BASOPHILS NFR BLD AUTO: 0.9 % (ref 0–1.5)
BUN SERPL-MCNC: 36 MG/DL (ref 8–23)
BUN/CREAT SERPL: 17.6 (ref 7–25)
CALCIUM SPEC-SCNC: 9.5 MG/DL (ref 8.6–10.5)
CHLORIDE SERPL-SCNC: 100 MMOL/L (ref 98–107)
CO2 SERPL-SCNC: 26.5 MMOL/L (ref 22–29)
CREAT SERPL-MCNC: 2.05 MG/DL (ref 0.57–1)
DEPRECATED RDW RBC AUTO: 67.3 FL (ref 37–54)
EGFRCR SERPLBLD CKD-EPI 2021: 22.8 ML/MIN/1.73
EOSINOPHIL # BLD AUTO: 0.24 10*3/MM3 (ref 0–0.4)
EOSINOPHIL NFR BLD AUTO: 3.5 % (ref 0.3–6.2)
ERYTHROCYTE [DISTWIDTH] IN BLOOD BY AUTOMATED COUNT: 18.8 % (ref 12.3–15.4)
GLUCOSE SERPL-MCNC: 102 MG/DL (ref 65–99)
HCT VFR BLD AUTO: 32.8 % (ref 34–46.6)
HGB BLD-MCNC: 10.3 G/DL (ref 12–15.9)
IMM GRANULOCYTES # BLD AUTO: 0.03 10*3/MM3 (ref 0–0.05)
IMM GRANULOCYTES NFR BLD AUTO: 0.4 % (ref 0–0.5)
LYMPHOCYTES # BLD AUTO: 1.31 10*3/MM3 (ref 0.7–3.1)
LYMPHOCYTES NFR BLD AUTO: 19.3 % (ref 19.6–45.3)
MCH RBC QN AUTO: 30.1 PG (ref 26.6–33)
MCHC RBC AUTO-ENTMCNC: 31.4 G/DL (ref 31.5–35.7)
MCV RBC AUTO: 95.9 FL (ref 79–97)
MONOCYTES # BLD AUTO: 0.72 10*3/MM3 (ref 0.1–0.9)
MONOCYTES NFR BLD AUTO: 10.6 % (ref 5–12)
NEUTROPHILS NFR BLD AUTO: 4.43 10*3/MM3 (ref 1.7–7)
NEUTROPHILS NFR BLD AUTO: 65.3 % (ref 42.7–76)
NRBC BLD AUTO-RTO: 0 /100 WBC (ref 0–0.2)
PLATELET # BLD AUTO: 178 10*3/MM3 (ref 140–450)
PMV BLD AUTO: 10.1 FL (ref 6–12)
POTASSIUM SERPL-SCNC: 3.9 MMOL/L (ref 3.5–5.2)
RBC # BLD AUTO: 3.42 10*6/MM3 (ref 3.77–5.28)
SODIUM SERPL-SCNC: 138 MMOL/L (ref 136–145)
WBC NRBC COR # BLD: 6.79 10*3/MM3 (ref 3.4–10.8)

## 2022-10-14 PROCEDURE — 94761 N-INVAS EAR/PLS OXIMETRY MLT: CPT

## 2022-10-14 PROCEDURE — 94799 UNLISTED PULMONARY SVC/PX: CPT

## 2022-10-14 PROCEDURE — 25010000002 ENOXAPARIN PER 10 MG: Performed by: INTERNAL MEDICINE

## 2022-10-14 PROCEDURE — 94640 AIRWAY INHALATION TREATMENT: CPT

## 2022-10-14 PROCEDURE — 97110 THERAPEUTIC EXERCISES: CPT

## 2022-10-14 PROCEDURE — 85025 COMPLETE CBC W/AUTO DIFF WBC: CPT | Performed by: INTERNAL MEDICINE

## 2022-10-14 PROCEDURE — 80048 BASIC METABOLIC PNL TOTAL CA: CPT | Performed by: INTERNAL MEDICINE

## 2022-10-14 RX ADMIN — DOCUSATE SODIUM 100 MG: 100 CAPSULE ORAL at 22:16

## 2022-10-14 RX ADMIN — Medication 10 ML: at 22:18

## 2022-10-14 RX ADMIN — ESCITALOPRAM 10 MG: 10 TABLET, FILM COATED ORAL at 08:10

## 2022-10-14 RX ADMIN — DOCUSATE SODIUM 100 MG: 100 CAPSULE ORAL at 08:10

## 2022-10-14 RX ADMIN — HYDRALAZINE HYDROCHLORIDE 25 MG: 25 TABLET, FILM COATED ORAL at 05:49

## 2022-10-14 RX ADMIN — HYDRALAZINE HYDROCHLORIDE 25 MG: 25 TABLET, FILM COATED ORAL at 22:16

## 2022-10-14 RX ADMIN — FUROSEMIDE 40 MG: 40 TABLET ORAL at 18:13

## 2022-10-14 RX ADMIN — IPRATROPIUM BROMIDE AND ALBUTEROL SULFATE 3 ML: .5; 2.5 SOLUTION RESPIRATORY (INHALATION) at 07:53

## 2022-10-14 RX ADMIN — METOPROLOL TARTRATE 12.5 MG: 25 TABLET, FILM COATED ORAL at 08:10

## 2022-10-14 RX ADMIN — PANTOPRAZOLE SODIUM 40 MG: 40 TABLET, DELAYED RELEASE ORAL at 05:49

## 2022-10-14 RX ADMIN — ENOXAPARIN SODIUM 70 MG: 80 INJECTION SUBCUTANEOUS at 22:17

## 2022-10-14 RX ADMIN — Medication 10 ML: at 08:11

## 2022-10-14 RX ADMIN — FUROSEMIDE 40 MG: 40 TABLET ORAL at 08:10

## 2022-10-14 RX ADMIN — GABAPENTIN 300 MG: 300 CAPSULE ORAL at 22:16

## 2022-10-14 RX ADMIN — IPRATROPIUM BROMIDE AND ALBUTEROL SULFATE 3 ML: .5; 2.5 SOLUTION RESPIRATORY (INHALATION) at 18:25

## 2022-10-14 RX ADMIN — Medication 10 MG: at 22:16

## 2022-10-14 RX ADMIN — METOPROLOL TARTRATE 12.5 MG: 25 TABLET, FILM COATED ORAL at 22:16

## 2022-10-14 RX ADMIN — Medication 10 ML: at 08:12

## 2022-10-14 NOTE — CASE MANAGEMENT/SOCIAL WORK
Discharge Planning Assessment   [facility]     Patient Name: Tre Kinney  MRN: [id number]  Today's Date: 10/14/2022    Admit Date: 10/5/2022    Plan: Pt was admitted to swing bed on 10/5/22. SS discussed pt with swing bed RN and updated clinicals have been submitted to pt's insurance requesting additional swing bed days. Pt lives with her son, Slade prior to admission. SS spoke to pt at bedside. Pt states she is unsure if she will be able to return home at discharge. Pt request to see contact her sonLeno after decision is made aboue additional swing bed days by her insurance. SS to follow.     Discharge Plan     Row Name 10/14/22 1614       Plan    Plan Pt was admitted to swing bed on 10/5/22. SS discussed pt with swing bed RN and updated clinicals have been submitted to pt's insurance requesting additional swing bed days. Pt lives with her son, Slade prior to admission. SS spoke to pt at bedside. Pt states she is unsure if she will be able to return home at discharge. Pt request to see contact her sonLeno after decision is made aboue additional swing bed days by her insurance. SS to follow.              Continued Care and Services - Admitted Since 10/5/2022    Coordination has not been started for this encounter.     Selected Continued Care - Prior Encounters Includes continued care and service providers with selected services from prior encounters from 7/7/2022 to 10/14/2022    Discharged on 10/5/2022 Admission date: 9/19/2022 - Discharge disposition: Swing Bed    Destination     Service Provider Selected Services Address Phone Fax Patient Preferred     [facility] [address] [place] [address] [place] [place] [id number] [phone] -- --                    LENORE Koch

## 2022-10-14 NOTE — THERAPY TREATMENT NOTE
Acute Care - Occupational Therapy Treatment Note   [facility]     Patient Name: Tre Kinney  : 1932  MRN: [id number]  Today's Date: 10/14/2022  Onset of Illness/Injury or Date of Surgery: 10/06/22     Referring Physician: Janneth    Admit Date: 10/5/2022     No diagnosis found.  Patient Active Problem List   Diagnosis   • Acid reflux   • Ankle arthralgia   • Cardiac conduction disorder   • Closed fracture of distal fibula   • Arteriosclerosis of coronary artery   • Fracture of distal end of tibia   • Elevated cholesterol   • BP (high blood pressure)   • Hypertrophic polyarthritis   • OP (osteoporosis)   • Right heart failure (HCC)   • Heart failure, chronic, right-sided (HCC)   • SOB (shortness of breath)   • Palpitations   • CHF (congestive heart failure) (HCC)   • Congestive heart failure (HCC)   • Precordial pain   • Chronic anticoagulation   • Hypokalemia   • Anemia, unspecified type   • Generalized weakness     Past Medical History:   Diagnosis Date   • Anemia    • Arthritis    • CHF (congestive heart failure) ([facility])    • Chronic kidney disease    • Coronary artery disease    • H/O blood clots    • History of transfusion     no reaction    • Hypertension      Past Surgical History:   Procedure Laterality Date   • ABDOMINAL SURGERY     • APPENDECTOMY     • CARDIAC SURGERY     •  SECTION     • COLONOSCOPY N/A 3/7/2018    Procedure: COLONOSCOPY;  Surgeon: Willie Gresham MD;  Location:  [facility];  Service:    • CORONARY ANGIOPLASTY WITH STENT PLACEMENT     • ENDOSCOPY N/A 3/6/2018    Procedure: ESOPHAGOGASTRODUODENOSCOPY;  Surgeon: Willie Gresham MD;  Location:  [facility];  Service:    • PACEMAKER IMPLANTATION      left side    • SKIN BIOPSY     • TUBAL ABDOMINAL LIGATION           OT ASSESSMENT FLOWSHEET (last 12 hours)     OT Evaluation and Treatment     Row Name 10/14/22 1110                   OT Time and Intention    Subjective Information complains of;fatigue  -LA        Document  Type therapy note (daily note)  -LA        Mode of Treatment occupational therapy  -LA        Patient Effort adequate  -LA           General Information    Patient Profile Reviewed yes  -LA        General Observations of Patient Patient agreeable to therapy this date. Increased fatigue this date. Patient able to complete exercises but appeared sleepy throughout session.  -LA        Existing Precautions/Restrictions fall  -LA           Cognition    Affect/Mental Status (Cognition) WFL  -LA        Orientation Status (Cognition) oriented x 3  -LA        Follows Commands (Cognition) WFL  -LA           Bed Mobility    Rolling Left Patterson (Bed Mobility) minimum assist (75% patient effort)  -LA        Rolling Right Patterson (Bed Mobility) minimum assist (75% patient effort)  -LA           Motor Skills    Motor Skills coordination;functional endurance  -LA        Coordination fine motor deficit;gross motor deficit;minimal impairment  -LA        Functional Endurance fair-  -LA        Therapeutic Exercise shoulder;elbow/forearm;wrist;hand  -LA        Additional Documentation --  Exercises increased this date; yellow theraband 10 x3 sets this date.  -LA           Balance    Static Sitting Balance contact guard;supervision  -LA           Plan of Care Review    Plan of Care Reviewed With patient  -LA        Progress no change  -LA           Positioning and Restraints    Pre-Treatment Position in bed  -LA        Post Treatment Position bed  -LA        In Bed supine;call light within reach;encouraged to call for assist;exit alarm on  -LA              User Key  (r) = Recorded By, (t) = Taken By, (c) = Cosigned By    Initials Name Effective Dates    Dianna Barreto OT 02/14/22 -                        OT Recommendation and Plan  Planned Therapy Interventions (OT): activity tolerance training, patient/caregiver education/training, adaptive equipment training, ROM/therapeutic exercise, occupation/activity based  interventions, BADL retraining, strengthening exercise, transfer/mobility retraining, functional balance retraining  Therapy Frequency (OT): 6 times/wk  Plan of Care Review  Plan of Care Reviewed With: patient  Progress: no change  Outcome Evaluation: OT evaluation completed this date. Patient engaged in UE tasks with focus on trunk control and fx reaching to promote increased independence with ADLs. Patient has had a significant decline in independence and safety with ADLs resulting in need for skilled OT services. OT to address deficits to allow patient to max level of independence prior to d/c. Patient admitted for swing bed and OT discussed activities. Patient given word search puzzles to promote increase activity. patient also reports she watches television. Family also stated they could bring in books for entertainment as needed.  Plan of Care Reviewed With: patient  Outcome Evaluation: OT evaluation completed this date. Patient engaged in UE tasks with focus on trunk control and fx reaching to promote increased independence with ADLs. Patient has had a significant decline in independence and safety with ADLs resulting in need for skilled OT services. OT to address deficits to allow patient to max level of independence prior to d/c. Patient admitted for swing bed and OT discussed activities. Patient given word search puzzles to promote increase activity. patient also reports she watches television. Family also stated they could bring in books for entertainment as needed.     Outcome Measures     Row Name 10/14/22 1100 10/13/22 1200          How much help from another is currently needed...    Putting on and taking off regular lower body clothing? 1  -LA 1  -LA     Bathing (including washing, rinsing, and drying) 2  -LA 2  -LA     Toileting (which includes using toilet bed pan or urinal) 2  -LA 2  -LA     Putting on and taking off regular upper body clothing 2  -LA 2  -LA     Taking care of personal grooming  (such as brushing teeth) 3  -LA 3  -LA     Eating meals 3  -LA 3  -LA     AM-PAC 6 Clicks Score (OT) 13  -LA 13  -LA        Functional Assessment    Outcome Measure Options AM-PAC 6 Clicks Daily Activity (OT)  -LA AM-PAC 6 Clicks Daily Activity (OT)  -LA           User Key  (r) = Recorded By, (t) = Taken By, (c) = Cosigned By    Initials Name Provider Type    Dianna Barreto OT Occupational Therapist                Time Calculation:    Time Calculation- OT     Row Name 10/14/22 1117             Time Calculation- OT    Total Timed Code Minutes- OT 30 minute(s)  -LA            User Key  (r) = Recorded By, (t) = Taken By, (c) = Cosigned By    Initials Name Provider Type    Dianna Barreto OT Occupational Therapist              Therapy Charges for Today     Code Description Service Date Service Provider Modifiers Qty    95626597422 HC OT THER PROC EA 15 MIN 10/13/2022 Dianna Siddiqi OT GO 1    [id number]  OT SELF CARE/MGMT/TRAIN EA 15 MIN 10/13/2022 Dianna Siddiqi OT GO 1    [id number]  OT THER PROC EA 15 MIN 10/14/2022 Dianna Siddiqi OT GO 2               Dianna Siddiqi OT  10/14/2022

## 2022-10-14 NOTE — PLAN OF CARE
Goal Outcome Evaluation:  Plan of Care Reviewed With: patient        Progress: no change  Outcome Evaluation: pt has rested throughout the shift, has had no complaints, no acute changes in patient, will continue plan of care

## 2022-10-14 NOTE — PROGRESS NOTES
"Palliative Care Daily Progress Note     S: Clinically, the patient states that she is tired but she has participated in therapies.  She with some breakfast but not much.  I spoke to her son, Leno, who states that she seems to be tired all the time.  He was concerned she has not very motivated to get back home.  He would like to stay as long as possible for rehab but understands that she will completed and get home.  I told him that she will receive some home health for a while.  He was very grateful for the update and is looking forward to having her back home.      O:   Palliative Performance Scale Score:     /64 (BP Location: Right arm, Patient Position: Lying)   Pulse 70   Temp 98.4 °F (36.9 °C) (Oral)   Resp 18   Ht 170.2 cm (67.01\")   Wt 74.8 kg (165 lb)   SpO2 96%   BMI 25.84 kg/m²     Intake/Output Summary (Last 24 hours) at 10/14/2022 1127  Last data filed at 10/14/2022 0500  Gross per 24 hour   Intake 120 ml   Output 1275 ml   Net -1155 ml       PE:  General Appearance:    Chronically ill appearing, somnolent   HEENT:    NC/AT   Neck:   supple   Lungs:     CTAB with poor inspiratory effort    Heart:    RRR, normal S1 and S2, no M/R/G   Abdomen:     Soft, nondistended   Extremities:   Moves all extremities, 2+ edema   Pulses:   Pulses palpable    Skin:   Warm, dry   Neurologic:   somnolent, cooperative, general weakness   Psych:   Calm, pleasant         Meds: Reviewed     Labs:   Results from last 7 days   Lab Units 10/14/22  0439   WBC 10*3/mm3 6.79   HEMOGLOBIN g/dL 10.3*   HEMATOCRIT % 32.8*   PLATELETS 10*3/mm3 178     Results from last 7 days   Lab Units 10/14/22  0439   SODIUM mmol/L 138   POTASSIUM mmol/L 3.9   CHLORIDE mmol/L 100   CO2 mmol/L 26.5   BUN mg/dL 36*   CREATININE mg/dL 2.05*   GLUCOSE mg/dL 102*   CALCIUM mg/dL 9.5     Results from last 7 days   Lab Units 10/14/22  0439   SODIUM mmol/L 138   POTASSIUM mmol/L 3.9   CHLORIDE mmol/L 100   CO2 mmol/L 26.5   BUN mg/dL 36* "   CREATININE mg/dL 2.05*   CALCIUM mg/dL 9.5   GLUCOSE mg/dL 102*     Imaging Results (Last 72 Hours)     Procedure Component Value Units Date/Time    XR Ankle 3+ View Left [[id number]] Collected: 10/12/22 1848     Updated: 10/12/22 1903    Narrative:      EXAM:    XR Left Ankle Complete, 3 or More Views     CLINICAL HISTORY:    Pain     TECHNIQUE:    Frontal, lateral and oblique views of the left ankle.     COMPARISON:    No relevant prior studies available.     FINDINGS:    Bones/joints:  Unremarkable.  No acute fracture.  No dislocation.    Soft tissues:  Unremarkable.       Impression:        No acute findings in the left ankle.     This report was finalized on 10/12/2022 6:49 PM by Dr. Bob Lovell MD.       XR Foot 2 View Left [[id number]] Collected: 10/12/22 1849     Updated: 10/12/22 1902    Narrative:      EXAM:    XR Left Foot, 2 Views     CLINICAL HISTORY:    Pain     TECHNIQUE:    Frontal and lateral views of the left foot.     COMPARISON:    No relevant prior studies available.     FINDINGS:    Bones/joints:  Unremarkable.  No acute fracture.  No dislocation.    Soft tissues:  Unremarkable.  No radiopaque foreign body.       Impression:        No acute findings in the left foot.     This report was finalized on 10/12/2022 6:49 PM by Dr. Bob Lovell MD.       US Venous Doppler Lower Extremity Left (duplex) [[id number]] Collected: 10/12/22 1828     Updated: 10/12/22 1902    Narrative:      EXAMINATION: US VENOUS DOPPLER LOWER EXTREMITY LEFT (DUPLEX)-      CLINICAL INDICATION: Pain        COMPARISON: None available     FINDINGS:  Color Doppler imaging with compression and augmentation to evaluate the  left lower extremity deep venous system     FINDINGS:  There is diffuse internal clot and noncompressibility throughout the  left lower extremity.       Impression:      Diffuse DVT in the left lower extremity     Results have been relayed to the referring physician      This report was finalized on  10/12/2022 6:30 PM by Dr. Bob Lovell MD.       XR Knee 1 or 2 View Left [[id number]] Collected: 10/12/22 1850     Updated: 10/12/22 1900    Narrative:      EXAM:    XR Left Knee, 1 or 2 Views     CLINICAL HISTORY:    Pain     TECHNIQUE:    Frontal and/or lateral views of the left knee.     COMPARISON:    06/12/2017     FINDINGS:    BONES/JOINTS:  Soft tissue calcification anterolateral adjacent to the  fibular head. No acute fracture.  No dislocation.    SOFT TISSUES:  Unremarkable.       Impression:        Soft tissue calcification anterolateral adjacent to the fibular head.  No acute fracture.     This report was finalized on 10/12/2022 6:51 PM by Dr. Bob Lovell MD.       XR Tibia Fibula 2 View Left [[id number]] Collected: 10/12/22 1849     Updated: 10/12/22 1900    Narrative:      EXAM:    XR Left Tibia and Fibula, 2 Views     CLINICAL HISTORY:    Pain     TECHNIQUE:    Frontal and lateral views of the left tibia and fibula.     COMPARISON:    No relevant prior studies available.     FINDINGS:    Bones/joints:  Soft tissue calcification anterolaterally, but no acute  fracture.  No dislocation.    Soft tissues:  Unremarkable.  No radiopaque foreign body.       Impression:        Soft tissue calcification anterolaterally, but no acute fracture.     This report was finalized on 10/12/2022 6:50 PM by Dr. Bob Lovell MD.               Diagnostics: Reviewed    A: Clinically, the patient is slowly improved and will be completing therapies with the plan of going home with home health.  I have updated the son and he is anxious to get home.  Patient is in no distress.      P: Continue therapies and discharge planning.  We have tried to encourage the patient and have updated the son as well.      We will continue to follow along. Please do not hesitate to contact us regarding further sx mgmt or GOC needs, including after hours or on weekends via our on call provider at [phone].     Farzad Vazquez  MD    10/14/2022

## 2022-10-14 NOTE — PAYOR COMM NOTE
"Michelet Jacques RN  Swing Bed Nurse  [phone] Ex [phone] FAX: [phone]  [email or web address]    NPI [id number]  Reference Number [id number]    Ambulation - Maximum assist of 1 or 2  Transfers - Extensive Assist of 1 or 2  Bed Mobility - Extensive assist of 1  WC Mobility - NA  Stair Training - NA  Feeding - Setup/cleanup assist of 1  Grooming - Moderate assist of 1  Bathing - Moderate assist of 1  Dressing - Moderate assist of 1  Toileting - Extensive assist of 1      Tre Kinney (89 y.o. Female)    YOB: 1932  Social Security Number:   Address: [address] [place] [address]  Home Phone: [phone]  MRN: [id number]  Presybeterian: Jew  Marital Status:         Admission Date: 10/5/22  Admission Type: Urgent  Admitting Provider: Jannet Lagunas MD  Attending Provider: Mustapha Loredo DO  Department, Room/Bed: [place] 133106/  Discharge Date:   Discharge Disposition:   Discharge Destination:               Attending Provider: Mustapha Loredo DO    Allergies: Adhesive Tape, Xanax [Alprazolam]    Isolation: None   Infection: None   Code Status: No CPR    Ht: 170.2 cm (67.01\")   Wt: 74.8 kg (165 lb)    Admission Cmt: None   Principal Problem: None                Active Insurance as of 10/5/2022     Primary Coverage     Payor Plan Insurance Group Employer/Plan Group    ANTHEM MEDICARE REPLACEMENT ANTHEM MEDICARE ADVANTAGE [id number]     Payor Plan Address Payor Plan Phone Number Payor Plan Fax Number Effective Dates    [address] [phone]  1/1/2022 - None Entered    [facility] [phone]       Subscriber Name Subscriber Birth Date Member ID       TRE KINNEY 12/9/1932 [id number]                 Emergency Contacts      (Rel.) Home Phone Work Phone Mobile Phone    Valdez Kinney (Son) [phone] None None    Leno Kinney (Son) [phone] None [phone]    "           Current Facility-Administered Medications   Medication Dose Route Frequency Provider Last Rate Last Admin   • docusate sodium (COLACE) capsule 100 mg  100 mg Oral BID Jannet Lagunas MD   100 mg at 10/14/22 0810   • Enoxaparin Sodium (LOVENOX) syringe 70 mg  1 mg/kg Subcutaneous Q24H Mustapha Loredo DO   70 mg at 10/13/22 2046   • escitalopram (LEXAPRO) tablet 10 mg  10 mg Oral Daily Jannet Lagunas MD   10 mg at 10/14/22 0810   • furosemide (LASIX) tablet 40 mg  40 mg Oral BID Jannet Lagunas MD   40 mg at 10/14/22 0810   • gabapentin (NEURONTIN) capsule 300 mg  300 mg Oral Nightly Jannet Lagunas MD   300 mg at 10/13/22 2047   • hydrALAZINE (APRESOLINE) tablet 25 mg  25 mg Oral Q8H Jannet Lagunas MD   25 mg at 10/14/22 0549   • HYDROcodone-acetaminophen (NORCO) 7.5-325 MG per tablet 1 tablet  1 tablet Oral Q8H PRN Jannet Lagunas MD   1 tablet at 10/10/22 0121   • ipratropium-albuterol (DUO-NEB) nebulizer solution 3 mL  3 mL Nebulization Q6H PRN Jannet Lagunas MD   3 mL at 10/14/22 0753   • melatonin tablet 10 mg  10 mg Oral Nightly Jannet Lagunas MD   10 mg at 10/13/22 2048   • metoprolol tartrate (LOPRESSOR) tablet 12.5 mg  12.5 mg Oral Q12H Jannet Lagunas MD   12.5 mg at 10/14/22 0810   • ondansetron (ZOFRAN) injection 4 mg  4 mg Intravenous Q6H PRN Jannet Lagunas MD       • pantoprazole (PROTONIX) EC tablet 40 mg  40 mg Oral Q AM Jannet Lagunas MD   40 mg at 10/14/22 0549   • phenol (CHLORASEPTIC) 1.4 % liquid 1 spray  1 spray Mouth/Throat Q2H PRN Jannet Lagunas MD   1 spray at 10/13/22 2056   • polyethylene glycol (MIRALAX) packet 17 g  17 g Oral BID PRN Jannet Lagunas MD       • prochlorperazine (COMPAZINE) injection 5 mg  5 mg Intravenous Q6H PRN Jannet Lagunas MD        Or   • prochlorperazine (COMPAZINE) tablet 5 mg  5 mg Oral Q6H PRN Jannet Lagunas MD        Or   • prochlorperazine (COMPAZINE) suppository 25 mg  25 mg Rectal Q12H  PRN Jannet Lagunas MD       • promethazine (PHENERGAN) 6.25 mg in sodium chloride 0.9 % 50 mL  6.25 mg Intravenous Q6H PRN Jannet Lagunas MD       • sodium chloride 0.9 % flush 10 mL  10 mL Intravenous PRN Jannet Lagunas MD       • sodium chloride 0.9 % flush 10 mL  10 mL Intravenous Q12H Jannet Lagunas MD   10 mL at 10/14/22 0812   • sodium chloride 0.9 % flush 10 mL  10 mL Intravenous PRN Jannet Lagunas MD       • sodium chloride 0.9 % flush 10 mL  10 mL Intravenous Q12H Jannet Lagunas MD   10 mL at 10/14/22 0811   • sodium chloride 0.9 % flush 10 mL  10 mL Intravenous PRN Jannet Lagunas MD       • [START ON 10/19/2022] tuberculin injection 5 Units  5 Units Intradermal Once Jannet Lagunas MD            Physician Progress Notes (most recent note)      Mustapha Loredo DO at 10/13/22 1515                [facility]    Subjective     History:   Tre Kinney is a 89 y.o. female admitted on 10/5/2022 secondary to <principal problem not specified>     Procedures: None    CC: Follow up debility    Reason for follow up swing bed visit: Newly diagnosed DVT    Patient seen and examined with SONAM Sanches. Awake and alert. Continues to report generalized weakness. Reports left leg pain. No reported CP or dyspnea. No reported nausea or vomiting. No acute events overnight per RN.     History taken from: patient, chart, and RN.      Objective     Vital Signs  Temp:  [98.1 °F (36.7 °C)-98.2 °F (36.8 °C)] 98.1 °F (36.7 °C)  Heart Rate:  [72-77] 72  Resp:  [16-18] 18  BP: (115-135)/(54-73) 135/73    Intake/Output Summary (Last 24 hours) at 10/13/2022 1515  Last data filed at 10/13/2022 0520  Gross per 24 hour   Intake 60 ml   Output 450 ml   Net -390 ml         Physical Exam:  General:    Awake, alert, in no acute distress, chronically ill appearing   Heart:      Normal S1 and S2. Regular rate and rhythm. No significant murmur, rubs or gallops appreciated.    Lungs:     Respirations regular, even and unlabored. Lungs clear to auscultation B/L. No wheezes, rales or rhonchi.   Abdomen:   Soft and nontender. No guarding, rebound tenderness or  organomegaly noted. Bowel sounds present x 4.   Extremities:  No lower extremity edema. Moves UE and LE equally B/L.     Results Review:    Results from last 7 days   Lab Units 10/10/22  0246 10/07/22  0047   WBC 10*3/mm3 6.70 7.41   HEMOGLOBIN g/dL 10.3* 10.4*   PLATELETS 10*3/mm3 201 250     Results from last 7 days   Lab Units 10/13/22  0128 10/12/22  0050 10/11/22  0304 10/10/22  0246 10/07/22  0047   SODIUM mmol/L 135* 137 136 138 136   POTASSIUM mmol/L 4.0 4.3 3.6 3.3* 3.9   CHLORIDE mmol/L 97* 100 98 99 97*   CO2 mmol/L 26.2 25.7 27.0 25.5 19.3*   BUN mg/dL 35* 39* 36* 39* 31*   CREATININE mg/dL 2.10* 2.14* 2.11* 2.40* 2.17*   CALCIUM mg/dL 8.8 9.1 9.0 9.0 9.4   GLUCOSE mg/dL 103* 113* 104* 116* 91         Results from last 7 days   Lab Units 10/11/22  0304 10/10/22  0246 10/07/22  0047   MAGNESIUM mg/dL 2.2 1.9 2.0         Results from last 7 days   Lab Units 10/10/22  0246 10/09/22  1329   TROPONIN T ng/mL 0.058* 0.063*       Imaging Results (Last 24 Hours)     Procedure Component Value Units Date/Time    XR Ankle 3+ View Left [[id number]] Collected: 10/12/22 1848     Updated: 10/12/22 1903    Narrative:      EXAM:    XR Left Ankle Complete, 3 or More Views     CLINICAL HISTORY:    Pain     TECHNIQUE:    Frontal, lateral and oblique views of the left ankle.     COMPARISON:    No relevant prior studies available.     FINDINGS:    Bones/joints:  Unremarkable.  No acute fracture.  No dislocation.    Soft tissues:  Unremarkable.       Impression:        No acute findings in the left ankle.     This report was finalized on 10/12/2022 6:49 PM by Dr. Bob Lovell MD.       XR Foot 2 View Left [[id number]] Collected: 10/12/22 1849     Updated: 10/12/22 1902    Narrative:      EXAM:    XR Left Foot, 2 Views     CLINICAL HISTORY:     Pain     TECHNIQUE:    Frontal and lateral views of the left foot.     COMPARISON:    No relevant prior studies available.     FINDINGS:    Bones/joints:  Unremarkable.  No acute fracture.  No dislocation.    Soft tissues:  Unremarkable.  No radiopaque foreign body.       Impression:        No acute findings in the left foot.     This report was finalized on 10/12/2022 6:49 PM by Dr. Bob Lovell MD.       US Venous Doppler Lower Extremity Left (duplex) [[id number]] Collected: 10/12/22 1828     Updated: 10/12/22 1902    Narrative:      EXAMINATION: US VENOUS DOPPLER LOWER EXTREMITY LEFT (DUPLEX)-      CLINICAL INDICATION: Pain        COMPARISON: None available     FINDINGS:  Color Doppler imaging with compression and augmentation to evaluate the  left lower extremity deep venous system     FINDINGS:  There is diffuse internal clot and noncompressibility throughout the  left lower extremity.       Impression:      Diffuse DVT in the left lower extremity     Results have been relayed to the referring physician      This report was finalized on 10/12/2022 6:30 PM by Dr. Bob Lovell MD.       XR Knee 1 or 2 View Left [[id number]] Collected: 10/12/22 1850     Updated: 10/12/22 1900    Narrative:      EXAM:    XR Left Knee, 1 or 2 Views     CLINICAL HISTORY:    Pain     TECHNIQUE:    Frontal and/or lateral views of the left knee.     COMPARISON:    06/12/2017     FINDINGS:    BONES/JOINTS:  Soft tissue calcification anterolateral adjacent to the  fibular head. No acute fracture.  No dislocation.    SOFT TISSUES:  Unremarkable.       Impression:        Soft tissue calcification anterolateral adjacent to the fibular head.  No acute fracture.     This report was finalized on 10/12/2022 6:51 PM by Dr. Bob Lovell MD.       XR Tibia Fibula 2 View Left [[id number]] Collected: 10/12/22 1849     Updated: 10/12/22 1900    Narrative:      EXAM:    XR Left Tibia and Fibula, 2 Views     CLINICAL HISTORY:    Pain     TECHNIQUE:     Frontal and lateral views of the left tibia and fibula.     COMPARISON:    No relevant prior studies available.     FINDINGS:    Bones/joints:  Soft tissue calcification anterolaterally, but no acute  fracture.  No dislocation.    Soft tissues:  Unremarkable.  No radiopaque foreign body.       Impression:        Soft tissue calcification anterolaterally, but no acute fracture.     This report was finalized on 10/12/2022 6:50 PM by Dr. Bob Lovell MD.               Medications:  docusate sodium, 100 mg, Oral, BID  enoxaparin, 1 mg/kg, Subcutaneous, Q24H  escitalopram, 10 mg, Oral, Daily  furosemide, 40 mg, Oral, BID  gabapentin, 300 mg, Oral, Nightly  hydrALAZINE, 25 mg, Oral, Q8H  melatonin, 10 mg, Oral, Nightly  metoprolol tartrate, 12.5 mg, Oral, Q12H  pantoprazole, 40 mg, Oral, Q AM  sodium chloride, 10 mL, Intravenous, Q12H  sodium chloride, 10 mL, Intravenous, Q12H  [START ON 10/19/2022] tuberculin, 5 Units, Intradermal, Once               Assessment & Plan   Generalized weakness/debility  Diffuse LLE DVT  DIANA on CKD IV  Acute blood loss anemia  Traumatic right gluteal hematoma  Paroxysmal Afib  Hx of SSS s/p pacemaker placement  Essential HTN  Reported hx of DVT's   Anxiety/depression  Advanced age    Plan  In the setting of reported intermittent LLE pain obtained x-rays which do not reveal any evidence of acute fractures. However, venous duplex US of LLE reveals extensive DVT. Cont Lovenox per pharmacy dosing. Monitor closely in the setting of recent traumatic right gluteal hematoma. Antihypertensives previously reduced to avoid relative hypotension with BP stable today. Creatine remains stable today and will continue current diuretic regimen. Cont PT/OT as tolerated. Repeat labs in the AM.       Disposition: Possible home with family vs long term SNF placement pending progression with therapy.      Mustapha Loredo DO  10/13/22  15:15 EDT    Electronically signed by Mustapha Loredo DO at  10/13/22 1552          Consult Notes (most recent note)      Liyah Jaquez APRN at 10/10/22 1145      Consult Orders    1. Inpatient Palliative Care MD Consult [[id number]] ordered by Mustapha Loredo DO at 10/10/22 0926               Palliative Care Initial Consult     Attending Physician: Mustapha Loredo, *  Referring Provider: Mustapha Loredo    Palliative care reason for consult:GOC/ACP support for pt and family.  Code Status:   Code Status and Medical Interventions:   Ordered at: 10/05/22 1253     Medical Intervention Limits:    NO intubation (DNI)     Level Of Support Discussed With:    Patient    Next of Kin (If No Surrogate)     Code Status (Patient has no pulse and is not breathing):    No CPR (Do Not Attempt to Resuscitate)     Medical Interventions (Patient has pulse or is breathing):    Limited Support     Comments:    Patient states that she does not want to be resuscitated or put on a ventilator, her son Leno supports her decision      Advanced Directives: Advance Directive Status: Patient does not have advance directive   Healthcare surrogate: RUSSEL three adult sons, Valdez, Leno and Willian.  Goals of Care: Tre confirmed that she does not want to be resuscitated or to be put on ventilator, and would like to return home with son Valdez when she is stronger, she is currently swing bed/working with PT.    HPI:  Tre Kinney is a 89 y.o. female admitted on 10/5/2022 to swing bed for physical therapy for ten days. She was originally admitted to the hospital on 9/19/2022 due to a fall and symptomatic anemia. She has a medical history CHF, chronic kidney disease, coronary artery disease s/p stenting, history of blood clots, chronic anticoagulation with Coumadin, SSS s/p pacemaker placement, chronic diastolic CHF, and iron deficiency anemia who presents today with a chief complaint of a fall. Per pt she has become progressively more weak and has experienced several falls. Pt has sustained  a moderate acute superior endplate vertebral compression fracture at the L1 with about 40% loss of vertebral body height and mild posterior osseous retropulsion of about 6 mm and also noted to have chronic compression fracture deformities involving the inferior endplates of L3 and L4 due to her first fall and from her second fall she does have a large hematoma on her right side, which led her to this admission. Pt had a hemoglobin of 6.1 for which she received 2 units of PRBC's. Pt denies any known source of bleeding and does say she had a scope(EGD colonoscopy at [facility] in 2018.        ROS: Negative except as above in HPI.     Past Medical History:   Diagnosis Date   • Anemia    • Arthritis    • CHF (congestive heart failure) (HCC)    • Chronic kidney disease    • Coronary artery disease    • H/O blood clots    • History of transfusion     no reaction    • Hypertension      Past Surgical History:   Procedure Laterality Date   • ABDOMINAL SURGERY     • APPENDECTOMY     • CARDIAC SURGERY     •  SECTION     • COLONOSCOPY N/A 3/7/2018    Procedure: COLONOSCOPY;  Surgeon: Willie Gresham MD;  Location:  [facility];  Service:    • CORONARY ANGIOPLASTY WITH STENT PLACEMENT     • ENDOSCOPY N/A 3/6/2018    Procedure: ESOPHAGOGASTRODUODENOSCOPY;  Surgeon: Willie Gresham MD;  Location:  [facility];  Service:    • PACEMAKER IMPLANTATION      left side    • SKIN BIOPSY     • TUBAL ABDOMINAL LIGATION       Social History     Socioeconomic History   • Marital status:    Tobacco Use   • Smoking status: Never   Substance and Sexual Activity   • Alcohol use: No   • Drug use: No   • Sexual activity: Defer     Family History   Problem Relation Age of Onset   • Other Mother         cardiac disorder   • Hypertension Mother    • Heart disease Mother    • Thyroid disease Mother    • Other Father         cardiac disorder   • Heart disease Father    • Hypertension Father    • Other Sister         cardiac disorder   •  Other Brother         cardiac disorder   • Other Other         cardiac disorder   • Other Other         cardiac disorder       Allergies   Allergen Reactions   • Adhesive Tape Dermatitis   • Xanax [Alprazolam] Unknown - Low Severity       Current Facility-Administered Medications   Medication Dose Route Frequency Provider Last Rate Last Admin   • docusate sodium (COLACE) capsule 100 mg  100 mg Oral BID Jannet Lagunas MD   100 mg at 10/10/22 0824   • escitalopram (LEXAPRO) tablet 10 mg  10 mg Oral Daily Jannet Lagunas MD   10 mg at 10/10/22 0824   • furosemide (LASIX) tablet 40 mg  40 mg Oral BID Jannet Lagunas MD   40 mg at 10/10/22 0824   • gabapentin (NEURONTIN) capsule 300 mg  300 mg Oral Nightly Jannet Lagunas MD   300 mg at 10/09/22 2132   • heparin (porcine) 5000 UNIT/ML injection 5,000 Units  5,000 Units Subcutaneous Q12H Jannet Lagunas MD   5,000 Units at 10/10/22 0824   • hydrALAZINE (APRESOLINE) tablet 25 mg  25 mg Oral Q8H Jannet Lagunas MD       • HYDROcodone-acetaminophen (NORCO) 7.5-325 MG per tablet 1 tablet  1 tablet Oral Q8H PRN Jannet Lagunas MD   1 tablet at 10/10/22 0121   • ipratropium-albuterol (DUO-NEB) nebulizer solution 3 mL  3 mL Nebulization Q6H PRN Jannet Lagunas MD   3 mL at 10/10/22 0745   • melatonin tablet 10 mg  10 mg Oral Nightly Jannet Lagunas MD   10 mg at 10/09/22 2132   • metoprolol tartrate (LOPRESSOR) tablet 12.5 mg  12.5 mg Oral Q12H Jannet Lagunas MD   12.5 mg at 10/10/22 0824   • ondansetron (ZOFRAN) injection 4 mg  4 mg Intravenous Q6H PRN Jannet Lagunas MD       • pantoprazole (PROTONIX) EC tablet 40 mg  40 mg Oral Q AM Jannet Lagunas MD   40 mg at 10/10/22 0443   • phenol (CHLORASEPTIC) 1.4 % liquid 1 spray  1 spray Mouth/Throat Q2H PRN Jannet Lagunas MD       • polyethylene glycol (MIRALAX) packet 17 g  17 g Oral BID PRN Jannet Lagunas MD       • prochlorperazine (COMPAZINE) injection 5 mg  5 mg Intravenous Q6H PRN  "Jannet Lagunas MD        Or   • prochlorperazine (COMPAZINE) tablet 5 mg  5 mg Oral Q6H PRN Jannet Lagunas MD        Or   • prochlorperazine (COMPAZINE) suppository 25 mg  25 mg Rectal Q12H PRN Jannet Lagunas MD       • promethazine (PHENERGAN) 6.25 mg in sodium chloride 0.9 % 50 mL  6.25 mg Intravenous Q6H PRN Jannet Lagunas MD       • sodium chloride 0.9 % flush 10 mL  10 mL Intravenous PRN Jannet Lagunas MD       • sodium chloride 0.9 % flush 10 mL  10 mL Intravenous Q12H Jannet Lagunas MD   10 mL at 10/10/22 0824   • sodium chloride 0.9 % flush 10 mL  10 mL Intravenous PRN Jannet Lagunas MD       • sodium chloride 0.9 % flush 10 mL  10 mL Intravenous Q12H Jannet Lagunas MD   10 mL at 10/10/22 0824   • sodium chloride 0.9 % flush 10 mL  10 mL Intravenous PRN Jannet Lagunas MD       • [START ON 10/19/2022] tuberculin injection 5 Units  5 Units Intradermal Once Jannet Lagunas MD            •  HYDROcodone-acetaminophen  •  ipratropium-albuterol  •  ondansetron  •  phenol  •  polyethylene glycol  •  prochlorperazine **OR** prochlorperazine **OR** prochlorperazine  •  promethazine  •  sodium chloride  •  sodium chloride  •  sodium chloride    Current medication reviewed for route, type, dose and frequency and are current per MAR.    Palliative Performance Scale Score:     /78 (BP Location: Left arm, Patient Position: Lying)   Pulse 77   Temp 98.1 °F (36.7 °C) (Oral)   Resp 16   Ht 170.2 cm (67.01\")   Wt 71.8 kg (158 lb 3.2 oz)   SpO2 95%   BMI 24.77 kg/m²     Intake/Output Summary (Last 24 hours) at 10/10/2022 1405  Last data filed at 10/10/2022 0410  Gross per 24 hour   Intake 360 ml   Output 1200 ml   Net -840 ml       PE:  General Appearance:    Pt appears weak, alert, cooperative, NAD   HEENT:    NC/AT, without obvious abnormality, EOMI, anicteric, nasal congestion present   Neck:   supple, trachea midline, no JVD   Lungs:     Regular even respirations, CTA, on " room air, no distress    Heart:    RRR, normal S1 and S2, no M/R/G   Abdomen:     Soft, NT, ND, NABS    Extremities:   Moves all extremities, no edema   Pulses:   Pulses palpable and equal bilaterally   Skin:   Warm, dry   Neurologic:   A/Ox3, cooperative, weakness   Psych:   Calm, appropriate       Labs:   Results from last 7 days   Lab Units 10/10/22  0246   WBC 10*3/mm3 6.70   HEMOGLOBIN g/dL 10.3*   HEMATOCRIT % 31.6*   PLATELETS 10*3/mm3 201     Results from last 7 days   Lab Units 10/10/22  0246   SODIUM mmol/L 138   POTASSIUM mmol/L 3.3*   CHLORIDE mmol/L 99   CO2 mmol/L 25.5   BUN mg/dL 39*   CREATININE mg/dL 2.40*   GLUCOSE mg/dL 116*   CALCIUM mg/dL 9.0     Results from last 7 days   Lab Units 10/10/22  0246 10/07/22  0047 10/05/22  0311   SODIUM mmol/L 138   < > 137   POTASSIUM mmol/L 3.3*   < > 3.7   CHLORIDE mmol/L 99   < > 98   CO2 mmol/L 25.5   < > 24.1   BUN mg/dL 39*   < > 21   CREATININE mg/dL 2.40*   < > 1.90*   CALCIUM mg/dL 9.0   < > 9.3   BILIRUBIN mg/dL  --   --  0.8   ALK PHOS U/L  --   --  66   ALT (SGPT) U/L  --   --  6   AST (SGOT) U/L  --   --  11   GLUCOSE mg/dL 116*   < > 101*    < > = values in this interval not displayed.     Imaging Results (Last 72 Hours)     ** No results found for the last 72 hours. **          Diagnostics: Reviewed    A: Tre Kinney is a 89 y.o. female admitted on 10/5/2022 to swing bed for physical therapy for ten days. She was originally admitted to the hospital on 9/19/2022 due to a fall and symptomatic anemia. She has a medical history CHF, chronic kidney disease, coronary artery disease s/p stenting, history of blood clots, chronic anticoagulation with Coumadin, SSS s/p pacemaker placement, chronic diastolic CHF, and iron deficiency anemia who presents today with a chief complaint of a fall. Per pt she has become progressively more weak and has experienced several falls. Pt has sustained a moderate acute superior endplate vertebral compression fracture at  the L1 with about 40% loss of vertebral body height and mild posterior osseous retropulsion of about 6 mm and also noted to have chronic compression fracture deformities involving the inferior endplates of L3 and L4 due to her first fall and from her second fall she does have a large hematoma on her right side, which led her to this admission. Pt had a hemoglobin of 6.1 for which she received 2 units of PRBC's. Pt denies any known source of bleeding and does say she had a scope(EGD colonoscopy at [facility] in 2018.    Today 10/10/2022  Pt was awake alert and oriented, she was afebrile, on room air saturating 95%, HR 77, RR 16 and BP of 153/78. Tre denied any pain, nausea, anxiety, or shortness of breath at this time.           P:   Palliative was consulted to discuss GOC/ACP support for pt and family. Palliative was able to speak with patients son Leno today to let him know that we were re-consulted. We let him know that Tre was working with PT when we entered the room at lunch time, and that we helped set up her lunch and she was eating at her potato, and that Tre told us to tell him that she was trying. Leno was under the impression that depending on how well Tre works with Therapy that she may be able to get an extension of swing bed days. I have reached out to Kelly in  to check and see what Tre's options would be, whether it be an extension on swing bed days or would she qualify for rehab placement. I will continue to follow up with patient and family.    We appreciate the consult and the opportunity to participate in Tre Kinney's care. We will continue to follow along. Please do not hesitate to contact us regarding further symptom management or goals of care needs, including after hours or on weekends via our on call provider at [phone].     Time: 30 minutes spent reviewing medical and medication records, assessing and examining patient, discussing with family, answering questions, providing some  guidance about a plan and documentation of care, and coordinating care with other healthcare members, with > 50% time spent face to face.     LANCE Cantu    10/10/2022      Electronically signed by Liyah Jaquez APRN at 10/10/22 1430          Nutrition Notes (most recent note)      Magi Oneal RD at 10/13/22 0831        Nutrition Services    Patient Name:  Tre iKnney  YOB: 1932  MRN: [id number]  Admit Date:  10/5/2022    BMI: 25.98  Diet: Soft Texture, Chopped, Thin   Supplement: Magic Cup BID  Intake: 31% avg over 12 meals  Fluid Intake:  Not meeting needs.     Electronically signed by:  Magi Oneal RD  10/13/22 08:31 EDT     Electronically signed by Magi Oneal RD at 10/13/22 0832          Physical Therapy Notes (most recent note)      Bhargavi Vásquez PT at 10/13/22 1458  Version 1 of 1         Acute Care - Physical Therapy Treatment Note  CODY Ellison     Patient Name: Tre Kinney  : 1932  MRN: [id number]  Today's Date: 10/13/2022   Onset of Illness/Injury or Date of Surgery: 10/06/22  Visit Dx:   No diagnosis found.  Patient Active Problem List   Diagnosis   • Acid reflux   • Ankle arthralgia   • Cardiac conduction disorder   • Closed fracture of distal fibula   • Arteriosclerosis of coronary artery   • Fracture of distal end of tibia   • Elevated cholesterol   • BP (high blood pressure)   • Hypertrophic polyarthritis   • OP (osteoporosis)   • Right heart failure (HCC)   • Heart failure, chronic, right-sided (HCC)   • SOB (shortness of breath)   • Palpitations   • CHF (congestive heart failure) (HCC)   • Congestive heart failure (HCC)   • Precordial pain   • Chronic anticoagulation   • Hypokalemia   • Anemia, unspecified type   • Generalized weakness     Past Medical History:   Diagnosis Date   • Anemia    • Arthritis    • CHF (congestive heart failure) (HCC)    • Chronic kidney disease    • Coronary artery disease    • H/O blood clots    • History of  transfusion     no reaction    • Hypertension      Past Surgical History:   Procedure Laterality Date   • ABDOMINAL SURGERY     • APPENDECTOMY     • CARDIAC SURGERY     •  SECTION     • COLONOSCOPY N/A 3/7/2018    Procedure: COLONOSCOPY;  Surgeon: Willie Gresham MD;  Location:  [facility];  Service:    • CORONARY ANGIOPLASTY WITH STENT PLACEMENT     • ENDOSCOPY N/A 3/6/2018    Procedure: ESOPHAGOGASTRODUODENOSCOPY;  Surgeon: Willie Gresham MD;  Location:  [facility];  Service:    • PACEMAKER IMPLANTATION      left side    • SKIN BIOPSY     • TUBAL ABDOMINAL LIGATION       PT Assessment (last 12 hours)     PT Evaluation and Treatment     Row Name 10/13/22 1454          Physical Therapy Time and Intention    Subjective Information complains of;weakness;fatigue  -CT     Document Type therapy note (daily note)  BID treatment sessions  -CT     Mode of Treatment physical therapy  -CT     Patient Effort adequate  -CT     Row Name 10/13/22 1454          General Information    Patient Profile Reviewed yes  -CT     Existing Precautions/Restrictions fall;TLSO  pt continues to decline TLSO use  -CT     Limitations/Impairments safety/cognitive  -CT     Row Name 10/13/22 1454          Home Use of Assistive/Adaptive Equipment    Equipment Currently Used at Home wheelchair;commode;walker, rolling;oxygen  -CT     Row Name 10/13/22 1454          Cognition    Affect/Mental Status (Cognition) WFL  -CT     Orientation Status (Cognition) oriented x 3  -CT     Follows Commands (Cognition) WFL  -CT     Row Name 10/13/22 1454          Bed Mobility    Bed Mobility sit-supine;supine-sit;scooting/bridging;rolling left;rolling right  -CT     Rolling Left [place] (Bed Mobility) minimum assist (75% patient effort)  -CT     Rolling Right [place] (Bed Mobility) minimum assist (75% patient effort)  -CT     Scooting/Bridging [place] (Bed Mobility) minimum assist (75% patient effort)  -CT     Supine-Sit [place]  (Bed Mobility) moderate assist (50% patient effort)  -CT     Sit-Supine [place] (Bed Mobility) moderate assist (50% patient effort);2 person assist;verbal cues;nonverbal cues (demo/gesture)  -CT     Bed Mobility, Safety Issues decreased use of arms for pushing/pulling;decreased use of legs for bridging/pushing;impaired trunk control for bed mobility  -CT     Assistive Device (Bed Mobility) bed rails;draw sheet  -CT     Row Name 10/13/22 1454          Transfers    Transfers bed-chair transfer;chair-bed transfer  -CT     Row Name 10/13/22 1454          Bed-Chair Transfer    Bed-Chair [place] (Transfers) maximum assist (25% patient effort);dependent (less than 25% patient effort);2 person assist  -CT     Assistive Device (Bed-Chair Transfers) walker, front-wheeled  -CT     Row Name 10/13/22 1454          Chair-Bed Transfer    Chair-Bed [place] (Transfers) maximum assist (25% patient effort);dependent (less than 25% patient effort);2 person assist  -CT     Assistive Device (Chair-Bed Transfers) walker, front-wheeled  -CT     Row Name 10/13/22 1454          Sit-Stand Transfer    Sit-Stand [place] (Transfers) moderate assist (50% patient effort);2 person assist;verbal cues  -CT     Assistive Device (Sit-Stand Transfers) walker, front-wheeled  -CT     Row Name 10/13/22 1454          Stand-Sit Transfer    Stand-Sit [place] (Transfers) moderate assist (50% patient effort);2 person assist;verbal cues  -CT     Assistive Device (Stand-Sit Transfers) walker, front-wheeled  -CT     Row Name 10/13/22 1456          Gait/Stairs (Locomotion)    [place] Level (Gait) maximum assist (25% patient effort);2 person assist;verbal cues;nonverbal cues (demo/gesture)  -CT     Assistive Device (Gait) walker, front-wheeled  -CT     Distance in Feet (Gait) 3 fot during transfer to/from chair  -CT     Pattern (Gait) step-to  -CT     Deviations/Abnormal Patterns (Gait) gait speed decreased;festinating/shuffling;weight  shifting decreased  -CT     Bilateral Gait Deviations weight shift ability decreased;forward flexed posture  -CT     Row Name 10/13/22 1454          Motor Skills    Therapeutic Exercise --  BLE sitting ther ex  -CT     Row Name 10/13/22 1454          Plan of Care Review    Plan of Care Reviewed With patient  -CT     Row Name 10/13/22 1454          Positioning and Restraints    Pre-Treatment Position in bed  -CT     In Bed call light within reach;encouraged to call for assist;exit alarm on;side lying left  -CT     In Chair sitting;call light within reach;encouraged to call for assist  -CT     Row Name 10/13/22 1454          Therapy Assessment/Plan (PT)    Rehab Potential (PT) fair, will monitor progress closely  -CT     Criteria for Skilled Interventions Met (PT) yes;meets criteria  -CT     Therapy Frequency (PT) 5 times/wk  5-6 times/wk  -CT           User Key  (r) = Recorded By, (t) = Taken By, (c) = Cosigned By    Initials Name Provider Type    CT Bhargavi Vásquez, PT Physical Therapist                  PT Recommendation and Plan  Anticipated Discharge Disposition (PT): home with / care, home with home health  Planned Therapy Interventions (PT): balance training, bed mobility training, gait training, home exercise program, manual therapy techniques, motor coordination training, neuromuscular re-education, patient/family education, postural re-education, strengthening, transfer training  Therapy Frequency (PT): 5 times/wk (5-6 times/wk)  Plan of Care Reviewed With: patient  Progress: improving   Outcome Measures     Row Name 10/13/22 1200             How much help from another is currently needed...    Putting on and taking off regular lower body clothing? 1  -LA      Bathing (including washing, rinsing, and drying) 2  -LA      Toileting (which includes using toilet bed pan or urinal) 2  -LA      Putting on and taking off regular upper body clothing 2  -LA      Taking care of personal grooming (such as brushing  teeth) 3  -LA      Eating meals 3  -LA      AM-PAC 6 Clicks Score (OT) 13  -LA         Functional Assessment    Outcome Measure Options AM-PAC 6 Clicks Daily Activity (OT)  -LA            User Key  (r) = Recorded By, (t) = Taken By, (c) = Cosigned By    Initials Name Provider Type    Dianna Barreto, OT Occupational Therapist                 Time Calculation:    PT Charges     Row Name 10/13/22 1457 10/13/22 1456          Time Calculation    PT Received On 10/13/22  -CT 10/13/22  -CT        Time Calculation- PT    Total Timed Code Minutes- PT 25 minute(s)  second treatment session  -CT 42 minute(s)  first treatment session  -CT           User Key  (r) = Recorded By, (t) = Taken By, (c) = Cosigned By    Initials Name Provider Type    Bhargavi Woody, PT Physical Therapist              Therapy Charges for Today     Code Description Service Date Service Provider Modifiers Qty    67349101615 HC GAIT TRAINING EA 15 MIN 10/12/2022 Bhargavi Vásquez, PT GP 1    55347748237 HC PT THERAPEUTIC ACT EA 15 MIN 10/12/2022 Bhargavi Vásquez, PT GP 1    44441170300 HC PT THER PROC EA 15 MIN 10/12/2022 Bhargavi Vásquez, PT GP 1    45885192938 HC GAIT TRAINING EA 15 MIN 10/12/2022 Bhargavi Vásquez, PT GP 1    84979347270 HC PT THERAPEUTIC ACT EA 15 MIN 10/12/2022 Bhargavi Vásquez, PT GP 1    13277883645 HC GAIT TRAINING EA 15 MIN 10/13/2022 Bhargavi Vásquez, PT GP 1    59127168096 HC PT THER PROC EA 15 MIN 10/13/2022 Bhargavi Vásquez, PT GP 1    59867340925 HC PT THERAPEUTIC ACT EA 15 MIN 10/13/2022 Bhargavi Vásquez, PT GP 1    94645184107 HC PT THERAPEUTIC ACT EA 15 MIN 10/13/2022 Bhargavi Vásquez, PT GP 1    83292712087 HC GAIT TRAINING EA 15 MIN 10/13/2022 Bhargavi Vásquez, PT GP 1          PT G-Codes  Outcome Measure Options: AM-PAC 6 Clicks Daily Activity (OT)  AM-PAC 6 Clicks Score (PT): 11  AM-PAC 6 Clicks Score (OT): 13    Bhargavi Vásquez, PT  10/13/2022      Electronically signed by Bhargavi áVsquez, PT at 10/13/22 6838           Occupational Therapy Notes (most recent note)      Dianna Siddiqi, OT at 10/13/22 1226          Acute Care - Occupational Therapy Treatment Note   [place]     Patient Name: Tre Kinney  : 1932  MRN: [id number]  Today's Date: 10/13/2022  Onset of Illness/Injury or Date of Surgery: 10/06/22     Referring Physician: Janneth    Admit Date: 10/5/2022     No diagnosis found.  Patient Active Problem List   Diagnosis   • Acid reflux   • Ankle arthralgia   • Cardiac conduction disorder   • Closed fracture of distal fibula   • Arteriosclerosis of coronary artery   • Fracture of distal end of tibia   • Elevated cholesterol   • BP (high blood pressure)   • Hypertrophic polyarthritis   • OP (osteoporosis)   • Right heart failure (HCC)   • Heart failure, chronic, right-sided (HCC)   • SOB (shortness of breath)   • Palpitations   • CHF (congestive heart failure) (HCC)   • Congestive heart failure (HCC)   • Precordial pain   • Chronic anticoagulation   • Hypokalemia   • Anemia, unspecified type   • Generalized weakness     Past Medical History:   Diagnosis Date   • Anemia    • Arthritis    • CHF (congestive heart failure) (HCC)    • Chronic kidney disease    • Coronary artery disease    • H/O blood clots    • History of transfusion     no reaction    • Hypertension      Past Surgical History:   Procedure Laterality Date   • ABDOMINAL SURGERY     • APPENDECTOMY     • CARDIAC SURGERY     •  SECTION     • COLONOSCOPY N/A 3/7/2018    Procedure: COLONOSCOPY;  Surgeon: Willie Gresham MD;  Location:  [facility];  Service:    • CORONARY ANGIOPLASTY WITH STENT PLACEMENT     • ENDOSCOPY N/A 3/6/2018    Procedure: ESOPHAGOGASTRODUODENOSCOPY;  Surgeon: Willie Gresham MD;  Location:  [facility];  Service:    • PACEMAKER IMPLANTATION      left side    • SKIN BIOPSY     • TUBAL ABDOMINAL LIGATION           OT ASSESSMENT FLOWSHEET (last 12 hours)     OT Evaluation and Treatment     Row Name 10/13/22 1219           "         OT Time and Intention    Subjective Information complains of;weakness;fatigue  -LA        Document Type therapy note (daily note)  -LA        Mode of Treatment occupational therapy  -LA        Patient Effort adequate  -LA        Symptoms Noted During/After Treatment fatigue  -LA           General Information    Patient Profile Reviewed yes  -LA        Patient/Family/Caregiver Comments/Observations Patient agreeable to therapy. Patient reports she hasn't ate anything this morning and was hungry. OT was going to call dietary and patient reported \"No, I'll just wait till lunch now.\"  -LA           Cognition    Affect/Mental Status (Cognition) WFL  -LA        Orientation Status (Cognition) oriented x 3  -LA        Follows Commands (Cognition) WFL  -LA           Grooming Assessment/Training    [place] (Grooming) minimum assist (75% patient effort);hair care, combing/brushing  -LA           Bed Mobility    Rolling Left [place] (Bed Mobility) moderate assist (50% patient effort)  -LA        Rolling Right Wing (Bed Mobility) moderate assist (50% patient effort)  -LA           Motor Skills    Motor Skills coordination;functional endurance  -LA        Coordination fine motor deficit;gross motor deficit;minimal impairment  -LA        Functional Endurance fair-  -LA        Therapeutic Exercise shoulder;elbow/forearm;wrist;hand  -LA        Additional Documentation --  Yellow theraband in all planes 10 x3 sets. Increased rest periods required for recovery  -LA           Plan of Care Review    Plan of Care Reviewed With patient  -LA        Progress no change  -LA           Positioning and Restraints    Pre-Treatment Position in bed  -LA        Post Treatment Position bed  -LA        In Bed call light within reach;encouraged to call for assist;exit alarm on;side lying left  -LA              User Key  (r) = Recorded By, (t) = Taken By, (c) = Cosigned By    Initials Name Effective Dates    SUSHMA Siddiqi, " VELASQUEZ Bustamante 02/14/22 -                        OT Recommendation and Plan  Planned Therapy Interventions (OT): activity tolerance training, patient/caregiver education/training, adaptive equipment training, ROM/therapeutic exercise, occupation/activity based interventions, BADL retraining, strengthening exercise, transfer/mobility retraining, functional balance retraining  Therapy Frequency (OT): 6 times/wk  Plan of Care Review  Plan of Care Reviewed With: patient  Progress: no change  Outcome Evaluation: OT evaluation completed this date. Patient engaged in UE tasks with focus on trunk control and fx reaching to promote increased independence with ADLs. Patient has had a significant decline in independence and safety with ADLs resulting in need for skilled OT services. OT to address deficits to allow patient to max level of independence prior to d/c. Patient admitted for swing bed and OT discussed activities. Patient given word search puzzles to promote increase activity. patient also reports she watches television. Family also stated they could bring in books for entertainment as needed.  Plan of Care Reviewed With: patient  Outcome Evaluation: OT evaluation completed this date. Patient engaged in UE tasks with focus on trunk control and fx reaching to promote increased independence with ADLs. Patient has had a significant decline in independence and safety with ADLs resulting in need for skilled OT services. OT to address deficits to allow patient to max level of independence prior to d/c. Patient admitted for swing bed and OT discussed activities. Patient given word search puzzles to promote increase activity. patient also reports she watches television. Family also stated they could bring in books for entertainment as needed.     Outcome Measures     Row Name 10/13/22 1200             How much help from another is currently needed...    Putting on and taking off regular lower body clothing? 1  -LA      Bathing  (including washing, rinsing, and drying) 2  -LA      Toileting (which includes using toilet bed pan or urinal) 2  -LA      Putting on and taking off regular upper body clothing 2  -LA      Taking care of personal grooming (such as brushing teeth) 3  -LA      Eating meals 3  -LA      AM-PAC 6 Clicks Score (OT) 13  -LA         Functional Assessment    Outcome Measure Options AM-PAC 6 Clicks Daily Activity (OT)  -LA            User Key  (r) = Recorded By, (t) = Taken By, (c) = Cosigned By    Initials Name Provider Type    Dianna Barreto OT Occupational Therapist                Time Calculation:    Time Calculation- OT     Row Name 10/13/22 1225             Time Calculation- OT    Total Timed Code Minutes- OT 30 minute(s)  -LA            User Key  (r) = Recorded By, (t) = Taken By, (c) = Cosigned By    Initials Name Provider Type    Dianna Barreto OT Occupational Therapist              Therapy Charges for Today     Code Description Service Date Service Provider Modifiers Qty    54807110461  OT THER PROC EA 15 MIN 10/13/2022 Dianna Siddiqi OT GO 1    [id number]  OT SELF CARE/MGMT/TRAIN EA 15 MIN 10/13/2022 Dianna Siddiqi OT GO 1               Dianna Siddiqi OT  10/13/2022    Electronically signed by Dianna Siddiqi OT at 10/13/22 1228     Kelly Jones BSW     Case Management  Case Management/Social Work     Signed  Date of Service:  10/12/22 1350  Creation Time:  10/12/22 1350     Signed        Discharge Planning Assessment   [place]     Patient Name: Tre Kinney                    MRN: [id number]  Today's Date: 10/12/2022                   Admit Date: 10/5/2022     Plan: Pt was admitted to swing bed on 10/5/22. Pt was approved through 10/14 with clincial reviews due. Pt lives with her son, Valdez and she plans to return home at discharge. Pt did not utilize home health services prior to admission. Pt has O2 PRN, wheelchair, rolling walker and bedside commode via [facility]. PCP is  Jessica Pedro. Pt did not have a POA or living will. SS to follow and assist as needed with discharge planning.             Discharge Plan            Row Name 10/12/22 1349             Plan      Plan Pt was admitted to swing bed on 10/5/22. Pt was approved through 10/14 with clincial reviews due. Pt lives with her son, Valdez and she plans to return home at discharge. Pt did not utilize home health services prior to admission. Pt has O2 PRN, wheelchair, rolling walker and bedside commode via [facility]. PCP is Jessica Pedro. Pt did not have a POA or living will. SS to follow and assist as needed with discharge planning.

## 2022-10-14 NOTE — THERAPY TREATMENT NOTE
Acute Care - Physical Therapy Treatment Note   [place]     Patient Name: Tre Kinney  : 1932  MRN: [id number]  Today's Date: 10/14/2022   Onset of Illness/Injury or Date of Surgery: 10/06/22  Visit Dx:   No diagnosis found.  Patient Active Problem List   Diagnosis   • Acid reflux   • Ankle arthralgia   • Cardiac conduction disorder   • Closed fracture of distal fibula   • Arteriosclerosis of coronary artery   • Fracture of distal end of tibia   • Elevated cholesterol   • BP (high blood pressure)   • Hypertrophic polyarthritis   • OP (osteoporosis)   • Right heart failure (HCC)   • Heart failure, chronic, right-sided (HCC)   • SOB (shortness of breath)   • Palpitations   • CHF (congestive heart failure) (HCC)   • Congestive heart failure (HCC)   • Precordial pain   • Chronic anticoagulation   • Hypokalemia   • Anemia, unspecified type   • Generalized weakness     Past Medical History:   Diagnosis Date   • Anemia    • Arthritis    • CHF (congestive heart failure) (HCC)    • Chronic kidney disease    • Coronary artery disease    • H/O blood clots    • History of transfusion     no reaction    • Hypertension      Past Surgical History:   Procedure Laterality Date   • ABDOMINAL SURGERY     • APPENDECTOMY     • CARDIAC SURGERY     •  SECTION     • COLONOSCOPY N/A 3/7/2018    Procedure: COLONOSCOPY;  Surgeon: Willie Gresham MD;  Location:  [facility];  Service:    • CORONARY ANGIOPLASTY WITH STENT PLACEMENT     • ENDOSCOPY N/A 3/6/2018    Procedure: ESOPHAGOGASTRODUODENOSCOPY;  Surgeon: Willie Gresham MD;  Location:  [facility];  Service:    • PACEMAKER IMPLANTATION      left side    • SKIN BIOPSY     • TUBAL ABDOMINAL LIGATION       PT Assessment (last 12 hours)     PT Evaluation and Treatment     Row Name 10/14/22 1140          Physical Therapy Time and Intention    Document Type therapy note (daily note)  -     Mode of Treatment physical therapy  -     Patient Effort adequate  -      Comment Pt seen this date for therapy working on supine LE TE.  -     Row Name 10/14/22 1140          Motor Skills    Therapeutic Exercise hip;knee  SLR A/AA; SAQ, quad sets, hip abd, hip add  -     Row Name 10/14/22 1140          Positioning and Restraints    Pre-Treatment Position in bed  -KH     Post Treatment Position bed  -     In Bed supine;call light within reach  -           User Key  (r) = Recorded By, (t) = Taken By, (c) = Cosigned By    Initials Name Provider Type    Radha Yusuf, PT Physical Therapist                  PT Recommendation and Plan         Outcome Measures     Row Name 10/14/22 1157 10/14/22 1100 10/13/22 1200       How much help from another person do you currently need...    Turning from your back to your side while in flat bed without using bedrails? 3  -KH -- --    Moving from lying on back to sitting on the side of a flat bed without bedrails? 2  -KH -- --    Moving to and from a bed to a chair (including a wheelchair)? 2  -KH -- --    Standing up from a chair using your arms (e.g., wheelchair, bedside chair)? 1  -KH -- --    Climbing 3-5 steps with a railing? 1  - -- --    To walk in hospital room? 1  - -- --    AM-PAC 6 Clicks Score (PT) 10  - -- --       How much help from another is currently needed...    Putting on and taking off regular lower body clothing? -- 1  -LA 1  -LA    Bathing (including washing, rinsing, and drying) -- 2  -LA 2  -LA    Toileting (which includes using toilet bed pan or urinal) -- 2  -LA 2  -LA    Putting on and taking off regular upper body clothing -- 2  -LA 2  -LA    Taking care of personal grooming (such as brushing teeth) -- 3  -LA 3  -LA    Eating meals -- 3  -LA 3  -LA    AM-PAC 6 Clicks Score (OT) -- 13  -LA 13  -LA       Functional Assessment    Outcome Measure Options AM-PAC 6 Clicks Basic Mobility (PT)  - AM-PAC 6 Clicks Daily Activity (OT)  -LA AM-PAC 6 Clicks Daily Activity (OT)  -LA          User Key  (r) = Recorded By,  (t) = Taken By, (c) = Cosigned By    Initials Name Provider Type    Radha Yusuf, PT Physical Therapist    Dianna Barreto, OT Occupational Therapist                 Time Calculation:    PT Charges     Row Name 10/14/22 1155             Time Calculation    PT Received On 10/14/22  -            User Key  (r) = Recorded By, (t) = Taken By, (c) = Cosigned By    Initials Name Provider Type    Radha Yusuf, PT Physical Therapist              Therapy Charges for Today     Code Description Service Date Service Provider Modifiers Qty    88388374625 HC PT THER PROC EA 15 MIN 10/14/2022 Radha Vu, PT GP 1    46590179879 HC PT THER PROC EA 15 MIN 10/14/2022 Radha Vu, PT GP 1          PT G-Codes  Outcome Measure Options: AM-PAC 6 Clicks Basic Mobility (PT)  AM-PAC 6 Clicks Score (PT): 10  AM-PAC 6 Clicks Score (OT): 13    Radha Vu PT  10/14/2022

## 2022-10-14 NOTE — PROGRESS NOTES
Pt admitted to swing bed on 10/5 for additional therapy in the setting of generalized weakness/debility. Pt not seen or examined today. I have reviewed her VS and labs. I have discussed her care with SONMA Sanches who does not report any acute events or issues that need to be addressed. BP overall stable today. Renal function stable on AM labs so will continue current diuretic regimen. Appears to be tolerating Lovenox with no obvious signs of bleeding reported with stable H&H. Cont PT/OT. Swing bed coordinator has submitted updates to insurance. May need to consider long term SNF placement if she does not show significant improvement with therapy. Repeat labs in the AM.       Mustapha Loredo DO  10/14/22  15:52 EDT

## 2022-10-15 LAB
ANION GAP SERPL CALCULATED.3IONS-SCNC: 12.5 MMOL/L (ref 5–15)
BASOPHILS # BLD AUTO: 0.06 10*3/MM3 (ref 0–0.2)
BASOPHILS NFR BLD AUTO: 0.9 % (ref 0–1.5)
BUN SERPL-MCNC: 37 MG/DL (ref 8–23)
BUN/CREAT SERPL: 16.4 (ref 7–25)
CALCIUM SPEC-SCNC: 8.9 MG/DL (ref 8.6–10.5)
CHLORIDE SERPL-SCNC: 97 MMOL/L (ref 98–107)
CO2 SERPL-SCNC: 26.5 MMOL/L (ref 22–29)
CREAT SERPL-MCNC: 2.26 MG/DL (ref 0.57–1)
DEPRECATED RDW RBC AUTO: 66.7 FL (ref 37–54)
EGFRCR SERPLBLD CKD-EPI 2021: 20.3 ML/MIN/1.73
EOSINOPHIL # BLD AUTO: 0.19 10*3/MM3 (ref 0–0.4)
EOSINOPHIL NFR BLD AUTO: 2.7 % (ref 0.3–6.2)
ERYTHROCYTE [DISTWIDTH] IN BLOOD BY AUTOMATED COUNT: 18.6 % (ref 12.3–15.4)
GLUCOSE SERPL-MCNC: 111 MG/DL (ref 65–99)
HCT VFR BLD AUTO: 31.4 % (ref 34–46.6)
HGB BLD-MCNC: 10 G/DL (ref 12–15.9)
IMM GRANULOCYTES # BLD AUTO: 0.04 10*3/MM3 (ref 0–0.05)
IMM GRANULOCYTES NFR BLD AUTO: 0.6 % (ref 0–0.5)
LYMPHOCYTES # BLD AUTO: 1.19 10*3/MM3 (ref 0.7–3.1)
LYMPHOCYTES NFR BLD AUTO: 17.2 % (ref 19.6–45.3)
MCH RBC QN AUTO: 30.7 PG (ref 26.6–33)
MCHC RBC AUTO-ENTMCNC: 31.8 G/DL (ref 31.5–35.7)
MCV RBC AUTO: 96.3 FL (ref 79–97)
MONOCYTES # BLD AUTO: 0.69 10*3/MM3 (ref 0.1–0.9)
MONOCYTES NFR BLD AUTO: 10 % (ref 5–12)
NEUTROPHILS NFR BLD AUTO: 4.74 10*3/MM3 (ref 1.7–7)
NEUTROPHILS NFR BLD AUTO: 68.6 % (ref 42.7–76)
NRBC BLD AUTO-RTO: 0 /100 WBC (ref 0–0.2)
PLATELET # BLD AUTO: 182 10*3/MM3 (ref 140–450)
PMV BLD AUTO: 9.9 FL (ref 6–12)
POTASSIUM SERPL-SCNC: 4.2 MMOL/L (ref 3.5–5.2)
RBC # BLD AUTO: 3.26 10*6/MM3 (ref 3.77–5.28)
SODIUM SERPL-SCNC: 136 MMOL/L (ref 136–145)
WBC NRBC COR # BLD: 6.91 10*3/MM3 (ref 3.4–10.8)

## 2022-10-15 PROCEDURE — 25010000002 ENOXAPARIN PER 10 MG: Performed by: INTERNAL MEDICINE

## 2022-10-15 PROCEDURE — 99308 SBSQ NF CARE LOW MDM 20: CPT | Performed by: INTERNAL MEDICINE

## 2022-10-15 PROCEDURE — 97530 THERAPEUTIC ACTIVITIES: CPT | Performed by: OCCUPATIONAL THERAPIST

## 2022-10-15 PROCEDURE — 85025 COMPLETE CBC W/AUTO DIFF WBC: CPT | Performed by: INTERNAL MEDICINE

## 2022-10-15 PROCEDURE — 80048 BASIC METABOLIC PNL TOTAL CA: CPT | Performed by: INTERNAL MEDICINE

## 2022-10-15 PROCEDURE — 94799 UNLISTED PULMONARY SVC/PX: CPT

## 2022-10-15 RX ORDER — HYDRALAZINE HYDROCHLORIDE 10 MG/1
10 TABLET, FILM COATED ORAL EVERY 8 HOURS SCHEDULED
Status: DISCONTINUED | OUTPATIENT
Start: 2022-10-15 | End: 2022-10-23

## 2022-10-15 RX ADMIN — METOPROLOL TARTRATE 12.5 MG: 25 TABLET, FILM COATED ORAL at 08:38

## 2022-10-15 RX ADMIN — Medication 10 ML: at 08:41

## 2022-10-15 RX ADMIN — DOCUSATE SODIUM 100 MG: 100 CAPSULE ORAL at 08:38

## 2022-10-15 RX ADMIN — Medication 10 ML: at 08:39

## 2022-10-15 RX ADMIN — DOCUSATE SODIUM 100 MG: 100 CAPSULE ORAL at 20:37

## 2022-10-15 RX ADMIN — Medication 10 MG: at 20:36

## 2022-10-15 RX ADMIN — IPRATROPIUM BROMIDE AND ALBUTEROL SULFATE 3 ML: .5; 2.5 SOLUTION RESPIRATORY (INHALATION) at 08:14

## 2022-10-15 RX ADMIN — PANTOPRAZOLE SODIUM 40 MG: 40 TABLET, DELAYED RELEASE ORAL at 06:21

## 2022-10-15 RX ADMIN — FUROSEMIDE 40 MG: 40 TABLET ORAL at 17:20

## 2022-10-15 RX ADMIN — ESCITALOPRAM 10 MG: 10 TABLET, FILM COATED ORAL at 08:39

## 2022-10-15 RX ADMIN — GABAPENTIN 300 MG: 300 CAPSULE ORAL at 20:36

## 2022-10-15 RX ADMIN — METOPROLOL TARTRATE 12.5 MG: 25 TABLET, FILM COATED ORAL at 20:36

## 2022-10-15 RX ADMIN — ENOXAPARIN SODIUM 70 MG: 80 INJECTION SUBCUTANEOUS at 20:37

## 2022-10-15 RX ADMIN — HYDRALAZINE HYDROCHLORIDE 25 MG: 25 TABLET, FILM COATED ORAL at 06:21

## 2022-10-15 RX ADMIN — HYDRALAZINE HYDROCHLORIDE 10 MG: 10 TABLET, FILM COATED ORAL at 20:37

## 2022-10-15 RX ADMIN — FUROSEMIDE 40 MG: 40 TABLET ORAL at 08:38

## 2022-10-15 NOTE — PROGRESS NOTES
Pt admitted to swing bed on 10/5 for additional therapy in the setting of generalized weakness/debility. Pt not seen or examined today. I have reviewed her VS and labs. I have discussed her care with SONAM Sanches who does not report any acute events. BP borderline low and hydralazine held yesterday. Will further reduce hydralazine.  Renal function overall stable on AM labs so will continue current diuretic regimen. Appears to be tolerating Lovenox with no obvious signs of bleeding reported with stable H&H. Cont PT/OT. Swing bed coordinator has submitted updates to insurance. May need to consider long term SNF placement if she does not show significant improvement with therapy. Repeat labs in the AM.       Mustapha Loredo,   10/15/22  15:52 EDT

## 2022-10-15 NOTE — THERAPY TREATMENT NOTE
Acute Care - Occupational Therapy Treatment Note   [facility]     Patient Name: Tre Kinney  : 1932  MRN: [id number]  Today's Date: 10/15/2022  Onset of Illness/Injury or Date of Surgery: 10/06/22     Referring Physician: Janneth    Admit Date: 10/5/2022     No diagnosis found.  Patient Active Problem List   Diagnosis   • Acid reflux   • Ankle arthralgia   • Cardiac conduction disorder   • Closed fracture of distal fibula   • Arteriosclerosis of coronary artery   • Fracture of distal end of tibia   • Elevated cholesterol   • BP (high blood pressure)   • Hypertrophic polyarthritis   • OP (osteoporosis)   • Right heart failure (HCC)   • Heart failure, chronic, right-sided (HCC)   • SOB (shortness of breath)   • Palpitations   • CHF (congestive heart failure) (HCC)   • Congestive heart failure (HCC)   • Precordial pain   • Chronic anticoagulation   • Hypokalemia   • Anemia, unspecified type   • Generalized weakness     Past Medical History:   Diagnosis Date   • Anemia    • Arthritis    • CHF (congestive heart failure) ([facility])    • Chronic kidney disease    • Coronary artery disease    • H/O blood clots    • History of transfusion     no reaction    • Hypertension      Past Surgical History:   Procedure Laterality Date   • ABDOMINAL SURGERY     • APPENDECTOMY     • CARDIAC SURGERY     •  SECTION     • COLONOSCOPY N/A 3/7/2018    Procedure: COLONOSCOPY;  Surgeon: Willie Gresham MD;  Location:  [facility];  Service:    • CORONARY ANGIOPLASTY WITH STENT PLACEMENT     • ENDOSCOPY N/A 3/6/2018    Procedure: ESOPHAGOGASTRODUODENOSCOPY;  Surgeon: Willie Gresham MD;  Location:  [facility];  Service:    • PACEMAKER IMPLANTATION      left side    • SKIN BIOPSY     • TUBAL ABDOMINAL LIGATION           OT ASSESSMENT FLOWSHEET (last 12 hours)     OT Evaluation and Treatment     Row Name 10/15/22 1212                   OT Time and Intention    Subjective Information complains of;fatigue  -AS        Document  Type therapy note (daily note)  -AS        Mode of Treatment occupational therapy  -AS        Patient Effort adequate  -AS        Symptoms Noted During/After Treatment fatigue  -AS           Shoulder (Therapeutic Exercise)    Shoulder (Therapeutic Exercise) AROM (active range of motion)  -AS        Shoulder AROM (Therapeutic Exercise) bilateral;flexion;extension;supine  -AS           Elbow/Forearm (Therapeutic Exercise)    Elbow/Forearm (Therapeutic Exercise) AROM (active range of motion);strengthening exercise  -AS           Wrist (Therapeutic Exercise)    Wrist (Therapeutic Exercise) AROM (active range of motion);strengthening exercise  -AS           Hand (Therapeutic Exercise)    Hand (Therapeutic Exercise) AROM (active range of motion);strengthening exercise  -AS              User Key  (r) = Recorded By, (t) = Taken By, (c) = Cosigned By    Initials Name Effective Dates    AS Priscilla Thakkar, OT 06/16/21 -                        OT Recommendation and Plan           Outcome Measures     Row Name 10/15/22 1200 10/14/22 1157 10/14/22 1100       How much help from another person do you currently need...    Turning from your back to your side while in flat bed without using bedrails? -- 3  -KH --    Moving from lying on back to sitting on the side of a flat bed without bedrails? -- 2  -KH --    Moving to and from a bed to a chair (including a wheelchair)? -- 2  -KH --    Standing up from a chair using your arms (e.g., wheelchair, bedside chair)? -- 1  -KH --    Climbing 3-5 steps with a railing? -- 1  -KH --    To walk in hospital room? -- 1  -KH --    AM-PAC 6 Clicks Score (PT) -- 10  -KH --       How much help from another is currently needed...    Putting on and taking off regular lower body clothing? 1  -AS -- 1  -LA    Bathing (including washing, rinsing, and drying) 2  -AS -- 2  -LA    Toileting (which includes using toilet bed pan or urinal) 2  -AS -- 2  -LA    Putting on and taking off regular upper body  clothing 2  -AS -- 2  -LA    Taking care of personal grooming (such as brushing teeth) 3  -AS -- 3  -LA    Eating meals 3  -AS -- 3  -LA    AM-PAC 6 Clicks Score (OT) 13  -AS -- 13  -LA       Functional Assessment    Outcome Measure Options -- AM-PAC 6 Clicks Basic Mobility (PT)  -KH AM-PAC 6 Clicks Daily Activity (OT)  -LA    Row Name 10/13/22 1200             How much help from another is currently needed...    Putting on and taking off regular lower body clothing? 1  -LA      Bathing (including washing, rinsing, and drying) 2  -LA      Toileting (which includes using toilet bed pan or urinal) 2  -LA      Putting on and taking off regular upper body clothing 2  -LA      Taking care of personal grooming (such as brushing teeth) 3  -LA      Eating meals 3  -LA      AM-PAC 6 Clicks Score (OT) 13  -LA         Functional Assessment    Outcome Measure Options AM-PAC 6 Clicks Daily Activity (OT)  -LA            User Key  (r) = Recorded By, (t) = Taken By, (c) = Cosigned By    Initials Name Provider Type    AS Priscilla Thakkar, OT Occupational Therapist    Radha Yusuf, PT Physical Therapist    Dianna Barreto, OT Occupational Therapist                Time Calculation:    Time Calculation- OT     Row Name 10/15/22 1214             Time Calculation- OT    Total Timed Code Minutes- OT 15 minute(s)  -AS      OT Non-Billable Time (min) 15 min  -AS            User Key  (r) = Recorded By, (t) = Taken By, (c) = Cosigned By    Initials Name Provider Type    AS Priscilla Thakkar, OT Occupational Therapist              Therapy Charges for Today     Code Description Service Date Service Provider Modifiers Qty    09665371137  OT THERAPEUTIC ACT EA 15 MIN 10/15/2022 Priscilla Thakkar, OT GO 1               Priscilla Thakkar OT  10/15/2022

## 2022-10-15 NOTE — PLAN OF CARE
Goal Outcome Evaluation:  Plan of Care Reviewed With: patient        Progress: no change  Outcome Evaluation: Pt resting well, has c/o of neck pain, changing position helped, no changes, will continue to monitor

## 2022-10-15 NOTE — PLAN OF CARE
Goal Outcome Evaluation:  Plan of Care Reviewed With: patient        Progress: no change  Outcome Evaluation: pt resting in bed, has had no complaints, no acute changes, will continue plan of care

## 2022-10-16 LAB
ANION GAP SERPL CALCULATED.3IONS-SCNC: 12.7 MMOL/L (ref 5–15)
BASOPHILS # BLD AUTO: 0.06 10*3/MM3 (ref 0–0.2)
BASOPHILS NFR BLD AUTO: 0.9 % (ref 0–1.5)
BUN SERPL-MCNC: 37 MG/DL (ref 8–23)
BUN/CREAT SERPL: 16.7 (ref 7–25)
CALCIUM SPEC-SCNC: 9.1 MG/DL (ref 8.6–10.5)
CHLORIDE SERPL-SCNC: 97 MMOL/L (ref 98–107)
CO2 SERPL-SCNC: 26.3 MMOL/L (ref 22–29)
CREAT SERPL-MCNC: 2.22 MG/DL (ref 0.57–1)
DEPRECATED RDW RBC AUTO: 66.4 FL (ref 37–54)
EGFRCR SERPLBLD CKD-EPI 2021: 20.7 ML/MIN/1.73
EOSINOPHIL # BLD AUTO: 0.27 10*3/MM3 (ref 0–0.4)
EOSINOPHIL NFR BLD AUTO: 4.2 % (ref 0.3–6.2)
ERYTHROCYTE [DISTWIDTH] IN BLOOD BY AUTOMATED COUNT: 18.5 % (ref 12.3–15.4)
GLUCOSE SERPL-MCNC: 113 MG/DL (ref 65–99)
HCT VFR BLD AUTO: 31.7 % (ref 34–46.6)
HGB BLD-MCNC: 10 G/DL (ref 12–15.9)
IMM GRANULOCYTES # BLD AUTO: 0.05 10*3/MM3 (ref 0–0.05)
IMM GRANULOCYTES NFR BLD AUTO: 0.8 % (ref 0–0.5)
LYMPHOCYTES # BLD AUTO: 0.89 10*3/MM3 (ref 0.7–3.1)
LYMPHOCYTES NFR BLD AUTO: 14 % (ref 19.6–45.3)
MCH RBC QN AUTO: 30.6 PG (ref 26.6–33)
MCHC RBC AUTO-ENTMCNC: 31.5 G/DL (ref 31.5–35.7)
MCV RBC AUTO: 96.9 FL (ref 79–97)
MONOCYTES # BLD AUTO: 0.64 10*3/MM3 (ref 0.1–0.9)
MONOCYTES NFR BLD AUTO: 10 % (ref 5–12)
NEUTROPHILS NFR BLD AUTO: 4.46 10*3/MM3 (ref 1.7–7)
NEUTROPHILS NFR BLD AUTO: 70.1 % (ref 42.7–76)
NRBC BLD AUTO-RTO: 0 /100 WBC (ref 0–0.2)
PLATELET # BLD AUTO: 186 10*3/MM3 (ref 140–450)
PMV BLD AUTO: 10.6 FL (ref 6–12)
POTASSIUM SERPL-SCNC: 4.1 MMOL/L (ref 3.5–5.2)
RBC # BLD AUTO: 3.27 10*6/MM3 (ref 3.77–5.28)
SODIUM SERPL-SCNC: 136 MMOL/L (ref 136–145)
WBC NRBC COR # BLD: 6.37 10*3/MM3 (ref 3.4–10.8)

## 2022-10-16 PROCEDURE — 25010000002 ENOXAPARIN PER 10 MG: Performed by: INTERNAL MEDICINE

## 2022-10-16 PROCEDURE — 80048 BASIC METABOLIC PNL TOTAL CA: CPT | Performed by: INTERNAL MEDICINE

## 2022-10-16 PROCEDURE — 85025 COMPLETE CBC W/AUTO DIFF WBC: CPT | Performed by: INTERNAL MEDICINE

## 2022-10-16 PROCEDURE — 94799 UNLISTED PULMONARY SVC/PX: CPT

## 2022-10-16 RX ADMIN — DOCUSATE SODIUM 100 MG: 100 CAPSULE ORAL at 09:12

## 2022-10-16 RX ADMIN — Medication 10 MG: at 20:30

## 2022-10-16 RX ADMIN — HYDRALAZINE HYDROCHLORIDE 10 MG: 10 TABLET, FILM COATED ORAL at 06:19

## 2022-10-16 RX ADMIN — HYDRALAZINE HYDROCHLORIDE 10 MG: 10 TABLET, FILM COATED ORAL at 13:24

## 2022-10-16 RX ADMIN — Medication 10 ML: at 09:14

## 2022-10-16 RX ADMIN — FUROSEMIDE 40 MG: 40 TABLET ORAL at 09:11

## 2022-10-16 RX ADMIN — ESCITALOPRAM 10 MG: 10 TABLET, FILM COATED ORAL at 09:12

## 2022-10-16 RX ADMIN — PANTOPRAZOLE SODIUM 40 MG: 40 TABLET, DELAYED RELEASE ORAL at 06:19

## 2022-10-16 RX ADMIN — DOCUSATE SODIUM 100 MG: 100 CAPSULE ORAL at 20:30

## 2022-10-16 RX ADMIN — Medication 10 ML: at 20:32

## 2022-10-16 RX ADMIN — METOPROLOL TARTRATE 12.5 MG: 25 TABLET, FILM COATED ORAL at 09:12

## 2022-10-16 RX ADMIN — Medication 10 ML: at 20:31

## 2022-10-16 RX ADMIN — GABAPENTIN 300 MG: 300 CAPSULE ORAL at 20:30

## 2022-10-16 RX ADMIN — FUROSEMIDE 40 MG: 40 TABLET ORAL at 17:10

## 2022-10-16 RX ADMIN — METOPROLOL TARTRATE 12.5 MG: 25 TABLET, FILM COATED ORAL at 20:36

## 2022-10-16 RX ADMIN — ENOXAPARIN SODIUM 70 MG: 80 INJECTION SUBCUTANEOUS at 20:30

## 2022-10-16 RX ADMIN — HYDRALAZINE HYDROCHLORIDE 10 MG: 10 TABLET, FILM COATED ORAL at 23:14

## 2022-10-16 NOTE — PLAN OF CARE
Goal Outcome Evaluation:  Plan of Care Reviewed With: patient        Progress: no change  Outcome Evaluation: pt resting in bed, has had no complaints, family is at bedside visiting, no acute changes, will continue plan of care

## 2022-10-16 NOTE — PLAN OF CARE
Goal Outcome Evaluation:  Plan of Care Reviewed With: patient        Progress: improving    Pt has slept well this shift, and had no acute changes. Will continue to follow current care plan.

## 2022-10-16 NOTE — PROGRESS NOTES
Pt admitted to swing bed on 10/5 for additional therapy in the setting of generalized weakness/debility. Pt not seen or examined today. I have reviewed her VS and labs. I have discussed her care with SONAM Sanches who does not report any acute events. BP improved after further reducing hydralazine yesterday.  Renal function overall stable on AM labs so will continue current diuretic regimen. Appears to be tolerating Lovenox with no obvious signs of bleeding reported with stable H&H. Cont Lovenox per pharmacy dosing for now for diffuse LLE DVT. Cont PT/OT. Swing bed coordinator has submitted updates to insurance and awaiting response. May need to consider long term SNF placement if she does not show significant improvement with therapy. Repeat labs in the AM.       Mustapha Loredo DO  10/16/22  15:52 EDT

## 2022-10-17 LAB
ANION GAP SERPL CALCULATED.3IONS-SCNC: 10.3 MMOL/L (ref 5–15)
BASOPHILS # BLD AUTO: 0.06 10*3/MM3 (ref 0–0.2)
BASOPHILS NFR BLD AUTO: 0.8 % (ref 0–1.5)
BUN SERPL-MCNC: 34 MG/DL (ref 8–23)
BUN/CREAT SERPL: 17.2 (ref 7–25)
CALCIUM SPEC-SCNC: 8.9 MG/DL (ref 8.6–10.5)
CHLORIDE SERPL-SCNC: 98 MMOL/L (ref 98–107)
CO2 SERPL-SCNC: 27.7 MMOL/L (ref 22–29)
CREAT SERPL-MCNC: 1.98 MG/DL (ref 0.57–1)
DEPRECATED RDW RBC AUTO: 64.1 FL (ref 37–54)
EGFRCR SERPLBLD CKD-EPI 2021: 23.8 ML/MIN/1.73
EOSINOPHIL # BLD AUTO: 0.27 10*3/MM3 (ref 0–0.4)
EOSINOPHIL NFR BLD AUTO: 3.7 % (ref 0.3–6.2)
ERYTHROCYTE [DISTWIDTH] IN BLOOD BY AUTOMATED COUNT: 18 % (ref 12.3–15.4)
GLUCOSE SERPL-MCNC: 108 MG/DL (ref 65–99)
HCT VFR BLD AUTO: 30.9 % (ref 34–46.6)
HGB BLD-MCNC: 9.8 G/DL (ref 12–15.9)
IMM GRANULOCYTES # BLD AUTO: 0.05 10*3/MM3 (ref 0–0.05)
IMM GRANULOCYTES NFR BLD AUTO: 0.7 % (ref 0–0.5)
LYMPHOCYTES # BLD AUTO: 1.09 10*3/MM3 (ref 0.7–3.1)
LYMPHOCYTES NFR BLD AUTO: 14.8 % (ref 19.6–45.3)
MCH RBC QN AUTO: 30.6 PG (ref 26.6–33)
MCHC RBC AUTO-ENTMCNC: 31.7 G/DL (ref 31.5–35.7)
MCV RBC AUTO: 96.6 FL (ref 79–97)
MONOCYTES # BLD AUTO: 0.72 10*3/MM3 (ref 0.1–0.9)
MONOCYTES NFR BLD AUTO: 9.8 % (ref 5–12)
NEUTROPHILS NFR BLD AUTO: 5.16 10*3/MM3 (ref 1.7–7)
NEUTROPHILS NFR BLD AUTO: 70.2 % (ref 42.7–76)
NRBC BLD AUTO-RTO: 0 /100 WBC (ref 0–0.2)
PLATELET # BLD AUTO: 197 10*3/MM3 (ref 140–450)
PMV BLD AUTO: 10.4 FL (ref 6–12)
POTASSIUM SERPL-SCNC: 3.8 MMOL/L (ref 3.5–5.2)
RBC # BLD AUTO: 3.2 10*6/MM3 (ref 3.77–5.28)
SODIUM SERPL-SCNC: 136 MMOL/L (ref 136–145)
WBC NRBC COR # BLD: 7.35 10*3/MM3 (ref 3.4–10.8)

## 2022-10-17 PROCEDURE — 80048 BASIC METABOLIC PNL TOTAL CA: CPT | Performed by: INTERNAL MEDICINE

## 2022-10-17 PROCEDURE — 97530 THERAPEUTIC ACTIVITIES: CPT

## 2022-10-17 PROCEDURE — 94799 UNLISTED PULMONARY SVC/PX: CPT

## 2022-10-17 PROCEDURE — 97110 THERAPEUTIC EXERCISES: CPT

## 2022-10-17 PROCEDURE — 97116 GAIT TRAINING THERAPY: CPT

## 2022-10-17 PROCEDURE — 94761 N-INVAS EAR/PLS OXIMETRY MLT: CPT

## 2022-10-17 PROCEDURE — 25010000002 ENOXAPARIN PER 10 MG

## 2022-10-17 PROCEDURE — 85025 COMPLETE CBC W/AUTO DIFF WBC: CPT | Performed by: INTERNAL MEDICINE

## 2022-10-17 RX ORDER — ENOXAPARIN SODIUM 100 MG/ML
1 INJECTION SUBCUTANEOUS EVERY 24 HOURS
Status: DISCONTINUED | OUTPATIENT
Start: 2022-10-17 | End: 2022-10-21

## 2022-10-17 RX ADMIN — Medication 10 ML: at 09:00

## 2022-10-17 RX ADMIN — Medication 10 ML: at 09:01

## 2022-10-17 RX ADMIN — ENOXAPARIN SODIUM 80 MG: 80 INJECTION SUBCUTANEOUS at 21:09

## 2022-10-17 RX ADMIN — ESCITALOPRAM 10 MG: 10 TABLET, FILM COATED ORAL at 09:00

## 2022-10-17 RX ADMIN — Medication 10 MG: at 21:10

## 2022-10-17 RX ADMIN — HYDRALAZINE HYDROCHLORIDE 10 MG: 10 TABLET, FILM COATED ORAL at 06:05

## 2022-10-17 RX ADMIN — FUROSEMIDE 40 MG: 40 TABLET ORAL at 17:11

## 2022-10-17 RX ADMIN — METOPROLOL TARTRATE 12.5 MG: 25 TABLET, FILM COATED ORAL at 21:10

## 2022-10-17 RX ADMIN — Medication 10 ML: at 21:09

## 2022-10-17 RX ADMIN — PANTOPRAZOLE SODIUM 40 MG: 40 TABLET, DELAYED RELEASE ORAL at 06:05

## 2022-10-17 RX ADMIN — DOCUSATE SODIUM 100 MG: 100 CAPSULE ORAL at 09:00

## 2022-10-17 RX ADMIN — FUROSEMIDE 40 MG: 40 TABLET ORAL at 09:00

## 2022-10-17 RX ADMIN — HYDRALAZINE HYDROCHLORIDE 10 MG: 10 TABLET, FILM COATED ORAL at 14:31

## 2022-10-17 RX ADMIN — HYDRALAZINE HYDROCHLORIDE 10 MG: 10 TABLET, FILM COATED ORAL at 21:10

## 2022-10-17 RX ADMIN — GABAPENTIN 300 MG: 300 CAPSULE ORAL at 21:09

## 2022-10-17 RX ADMIN — METOPROLOL TARTRATE 12.5 MG: 25 TABLET, FILM COATED ORAL at 09:00

## 2022-10-17 NOTE — CASE MANAGEMENT/SOCIAL WORK
Discharge Planning Assessment   [facility]     Patient Name: Tre Kinney  MRN: [id number]  Today's Date: 10/17/2022    Admit Date: 10/5/2022       Discharge Plan     Row Name 10/17/22 1635       Plan    Plan Pt was admitted to swing bed on 10/5/22. Insurance determination for additional swing bed days is pending. SS discussed pt with PT. PT is recommending home with a jen lift and 24/7 care or nursing home placement. SS contacted pt's son, Leno after discussing pt with PT. Pt's son wants to wait for insurance determination for additional swing bed days before making a decision about pt's disposition. SS to follow.              Continued Care and Services - Admitted Since 10/5/2022    Coordination has not been started for this encounter.     Selected Continued Care - Prior Encounters Includes continued care and service providers with selected services from prior encounters from 7/7/2022 to 10/17/2022    Discharged on 10/5/2022 Admission date: 9/19/2022 - Discharge disposition: Swing Bed    Destination     Service Provider Selected Services Address Phone Fax Patient Preferred     [facility] [address] [facility] [place] [address] [phone] -- --                  LENORE Koch

## 2022-10-17 NOTE — THERAPY TREATMENT NOTE
Acute Care - Occupational Therapy Treatment Note   [facility]     Patient Name: Tre Kinney  : 1932  MRN: [id number]  Today's Date: 10/17/2022  Onset of Illness/Injury or Date of Surgery: 10/06/22     Referring Physician: Janneth    Admit Date: 10/5/2022     No diagnosis found.  Patient Active Problem List   Diagnosis   • Acid reflux   • Ankle arthralgia   • Cardiac conduction disorder   • Closed fracture of distal fibula   • Arteriosclerosis of coronary artery   • Fracture of distal end of tibia   • Elevated cholesterol   • BP (high blood pressure)   • Hypertrophic polyarthritis   • OP (osteoporosis)   • Right heart failure (HCC)   • Heart failure, chronic, right-sided (HCC)   • SOB (shortness of breath)   • Palpitations   • CHF (congestive heart failure) (HCC)   • Congestive heart failure (HCC)   • Precordial pain   • Chronic anticoagulation   • Hypokalemia   • Anemia, unspecified type   • Generalized weakness     Past Medical History:   Diagnosis Date   • Anemia    • Arthritis    • CHF (congestive heart failure) ([facility])    • Chronic kidney disease    • Coronary artery disease    • H/O blood clots    • History of transfusion     no reaction    • Hypertension      Past Surgical History:   Procedure Laterality Date   • ABDOMINAL SURGERY     • APPENDECTOMY     • CARDIAC SURGERY     •  SECTION     • COLONOSCOPY N/A 3/7/2018    Procedure: COLONOSCOPY;  Surgeon: Willie Gresham MD;  Location:  [facility];  Service:    • CORONARY ANGIOPLASTY WITH STENT PLACEMENT     • ENDOSCOPY N/A 3/6/2018    Procedure: ESOPHAGOGASTRODUODENOSCOPY;  Surgeon: Willie Gresham MD;  Location:  [facility];  Service:    • PACEMAKER IMPLANTATION      left side    • SKIN BIOPSY     • TUBAL ABDOMINAL LIGATION           OT ASSESSMENT FLOWSHEET (last 12 hours)     OT Evaluation and Treatment     Row Name 10/17/22 1453                   OT Time and Intention    Subjective Information no complaints  -LA        Document Type  therapy note (daily note)  -LA        Mode of Treatment occupational therapy  -LA        Patient Effort fair  -LA           General Information    Patient Profile Reviewed yes  -LA        Patient/Family/Caregiver Comments/Observations Patient agreeable to therapy. Patient son present during session. Discussed with son progress made with strengthening of UE's but concerns that transfers were not improving as expected.  -LA           Cognition    Affect/Mental Status (Cognition) WFL  -LA        Orientation Status (Cognition) oriented x 3  -LA        Follows Commands (Cognition) WFL  -LA           Bed Mobility    Rolling Left Point Clear (Bed Mobility) minimum assist (75% patient effort)  -LA        Rolling Right Point Clear (Bed Mobility) minimum assist (75% patient effort)  -LA           Motor Skills    Motor Skills coordination;functional endurance  -LA        Coordination gross motor deficit  -LA        Functional Endurance fair-  -LA        Therapeutic Exercise shoulder;elbow/forearm;wrist;hand  -LA        Additional Documentation --  Bilateral UE strengthening completed with yellow theraband in all planes 10 x3 sets this date.  -LA           Plan of Care Review    Plan of Care Reviewed With patient;son  -LA        Progress no change  -LA           Positioning and Restraints    Pre-Treatment Position in bed  -LA        Post Treatment Position bed  -LA        In Bed fowlers;call light within reach;encouraged to call for assist;exit alarm on  -LA              User Key  (r) = Recorded By, (t) = Taken By, (c) = Cosigned By    Initials Name Effective Dates    Dianna Barreto OT 02/14/22 -                        OT Recommendation and Plan  Planned Therapy Interventions (OT): activity tolerance training, patient/caregiver education/training, adaptive equipment training, ROM/therapeutic exercise, occupation/activity based interventions, BADL retraining, strengthening exercise, transfer/mobility retraining, functional  balance retraining  Therapy Frequency (OT): 6 times/wk  Plan of Care Review  Plan of Care Reviewed With: patient, son  Progress: no change  Outcome Evaluation: OT evaluation completed this date. Patient engaged in UE tasks with focus on trunk control and fx reaching to promote increased independence with ADLs. Patient has had a significant decline in independence and safety with ADLs resulting in need for skilled OT services. OT to address deficits to allow patient to max level of independence prior to d/c. Patient admitted for swing bed and OT discussed activities. Patient given word search puzzles to promote increase activity. patient also reports she watches television. Family also stated they could bring in books for entertainment as needed.  Plan of Care Reviewed With: patient, son  Outcome Evaluation: OT evaluation completed this date. Patient engaged in UE tasks with focus on trunk control and fx reaching to promote increased independence with ADLs. Patient has had a significant decline in independence and safety with ADLs resulting in need for skilled OT services. OT to address deficits to allow patient to max level of independence prior to d/c. Patient admitted for swing bed and OT discussed activities. Patient given word search puzzles to promote increase activity. patient also reports she watches television. Family also stated they could bring in books for entertainment as needed.     Outcome Measures     Row Name 10/17/22 1400 10/15/22 1200          How much help from another is currently needed...    Putting on and taking off regular lower body clothing? 1  -LA 1  -AS     Bathing (including washing, rinsing, and drying) 2  -LA 2  -AS     Toileting (which includes using toilet bed pan or urinal) 2  -LA 2  -AS     Putting on and taking off regular upper body clothing 2  -LA 2  -AS     Taking care of personal grooming (such as brushing teeth) 3  -LA 3  -AS     Eating meals 3  -LA 3  -AS     AM-PAC 6 Clicks  Score (OT) 13  -LA 13  -AS        Functional Assessment    Outcome Measure Options AM-PAC 6 Clicks Daily Activity (OT)  -LA --           User Key  (r) = Recorded By, (t) = Taken By, (c) = Cosigned By    Initials Name Provider Type    AS Priscilla Thakkar, OT Occupational Therapist    Dianna Barreto OT Occupational Therapist                Time Calculation:    Time Calculation- OT     Row Name 10/17/22 1457             Time Calculation- OT    Total Timed Code Minutes- OT 30 minute(s)  -LA            User Key  (r) = Recorded By, (t) = Taken By, (c) = Cosigned By    Initials Name Provider Type    Dianna Barreto OT Occupational Therapist              Therapy Charges for Today     Code Description Service Date Service Provider Modifiers Qty    14889186236 HC OT THER PROC EA 15 MIN 10/17/2022 Dainna Siddiqi OT GO 2               Dianna Siddiqi OT  10/17/2022

## 2022-10-17 NOTE — PLAN OF CARE
Goal Outcome Evaluation:  Plan of Care Reviewed With: patient        Progress: improving  Outcome Evaluation: Pt resting in bed. Vital signs stable. Midline dressing changed. No acute changes. Will continue plan of care.

## 2022-10-17 NOTE — THERAPY TREATMENT NOTE
Acute Care - Physical Therapy Treatment Note   [place]     Patient Name: Tre Kinney  : 1932  MRN: [id number]  Today's Date: 10/17/2022   Onset of Illness/Injury or Date of Surgery: 10/06/22  Visit Dx:   No diagnosis found.  Patient Active Problem List   Diagnosis   • Acid reflux   • Ankle arthralgia   • Cardiac conduction disorder   • Closed fracture of distal fibula   • Arteriosclerosis of coronary artery   • Fracture of distal end of tibia   • Elevated cholesterol   • BP (high blood pressure)   • Hypertrophic polyarthritis   • OP (osteoporosis)   • Right heart failure (HCC)   • Heart failure, chronic, right-sided (HCC)   • SOB (shortness of breath)   • Palpitations   • CHF (congestive heart failure) (HCC)   • Congestive heart failure (HCC)   • Precordial pain   • Chronic anticoagulation   • Hypokalemia   • Anemia, unspecified type   • Generalized weakness     Past Medical History:   Diagnosis Date   • Anemia    • Arthritis    • CHF (congestive heart failure) (HCC)    • Chronic kidney disease    • Coronary artery disease    • H/O blood clots    • History of transfusion     no reaction    • Hypertension      Past Surgical History:   Procedure Laterality Date   • ABDOMINAL SURGERY     • APPENDECTOMY     • CARDIAC SURGERY     •  SECTION     • COLONOSCOPY N/A 3/7/2018    Procedure: COLONOSCOPY;  Surgeon: Willie Gresham MD;  Location:  [facility];  Service:    • CORONARY ANGIOPLASTY WITH STENT PLACEMENT     • ENDOSCOPY N/A 3/6/2018    Procedure: ESOPHAGOGASTRODUODENOSCOPY;  Surgeon: Willie Gresham MD;  Location:  [facility];  Service:    • PACEMAKER IMPLANTATION      left side    • SKIN BIOPSY     • TUBAL ABDOMINAL LIGATION       PT Assessment (last 12 hours)     PT Evaluation and Treatment     Row Name 10/17/22 1302          Physical Therapy Time and Intention    Subjective Information complains of;weakness;fatigue;pain  -CT     Document Type therapy note (daily note)  -CT     Mode of  Treatment physical therapy  -CT     Patient Effort poor  -CT     Comment Pt continues to be Max/Dep x 2 for transfer to chair. Pt attempts to sit early during transfer creating a safety concern for pt and staff. Nursing staff to use Lee lift to put pt back to bed.  -CT     Row Name 10/17/22 1302          General Information    Patient Profile Reviewed yes  -CT     Existing Precautions/Restrictions fall;TLSO  pt continues to decline TLSO use  -CT     Limitations/Impairments safety/cognitive  -CT     Row Name 10/17/22 1302          Home Use of Assistive/Adaptive Equipment    Equipment Currently Used at Home wheelchair;commode;walker, rolling;oxygen  -CT     Row Name 10/17/22 1302          Cognition    Affect/Mental Status (Cognition) WFL  -CT     Orientation Status (Cognition) oriented x 3  -CT     Follows Commands (Cognition) WFL  -CT     Row Name 10/17/22 1302          Bed Mobility    Bed Mobility sit-supine;supine-sit;scooting/bridging;rolling left;rolling right  -CT     Rolling Left [place] (Bed Mobility) minimum assist (75% patient effort)  -CT     Rolling Right [place] (Bed Mobility) minimum assist (75% patient effort)  -CT     Scooting/Bridging [place] (Bed Mobility) minimum assist (75% patient effort)  -CT     Supine-Sit [place] (Bed Mobility) moderate assist (50% patient effort)  -CT     Sit-Supine [place] (Bed Mobility) moderate assist (50% patient effort);2 person assist;verbal cues;nonverbal cues (demo/gesture)  -CT     Bed Mobility, Safety Issues decreased use of arms for pushing/pulling;decreased use of legs for bridging/pushing;impaired trunk control for bed mobility  -CT     Assistive Device (Bed Mobility) bed rails;draw sheet  -CT     Row Name 10/17/22 1302          Transfers    Transfers bed-chair transfer;chair-bed transfer  -CT     Row Name 10/17/22 1302          Bed-Chair Transfer    Bed-Chair [place] (Transfers) maximum assist (25% patient effort);dependent (less  than 25% patient effort);2 person assist  -CT     Assistive Device (Bed-Chair Transfers) walker, front-wheeled  -CT     Row Name 10/17/22 1302          Chair-Bed Transfer    Chair-Bed [place] (Transfers) maximum assist (25% patient effort);dependent (less than 25% patient effort);2 person assist  -CT     Assistive Device (Chair-Bed Transfers) walker, front-wheeled  -CT     Row Name 10/17/22 1302          Sit-Stand Transfer    Sit-Stand [place] (Transfers) moderate assist (50% patient effort);maximum assist (25% patient effort);2 person assist;verbal cues  -CT     Assistive Device (Sit-Stand Transfers) walker, front-wheeled  -CT     Row Name 10/17/22 1302          Stand-Sit Transfer    Stand-Sit [place] (Transfers) moderate assist (50% patient effort);2 person assist;verbal cues  -CT     Assistive Device (Stand-Sit Transfers) walker, front-wheeled  -CT     Row Name 10/17/22 1302          Gait/Stairs (Locomotion)    [place] Level (Gait) maximum assist (25% patient effort);dependent (less than 25% patient effort);2 person assist;verbal cues;nonverbal cues (demo/gesture)  -CT     Assistive Device (Gait) walker, front-wheeled  -CT     Distance in Feet (Gait) 3 ft during transfer  -CT     Pattern (Gait) step-to  -CT     Deviations/Abnormal Patterns (Gait) gait speed decreased;festinating/shuffling;weight shifting decreased  -CT     Bilateral Gait Deviations weight shift ability decreased;forward flexed posture  -CT     Comment, (Gait/Stairs) pt attempts to sit before transfer is complete creating a safety concern  -CT     Row Name 10/17/22 1302          Plan of Care Review    Plan of Care Reviewed With patient  -CT     Progress no change  -CT     Row Name 10/17/22 1302          Positioning and Restraints    Pre-Treatment Position in bed  -CT     Post Treatment Position chair  -CT     In Chair sitting;call light within reach;encouraged to call for assist  -CT     Row Name 10/17/22 1302          Therapy  Assessment/Plan (PT)    Rehab Potential (PT) fair, will monitor progress closely  -CT     Criteria for Skilled Interventions Met (PT) yes;meets criteria  -CT     Therapy Frequency (PT) 5 times/wk  5-6 times/wk  -CT           User Key  (r) = Recorded By, (t) = Taken By, (c) = Cosigned By    Initials Name Provider Type    CT Bhargavi Vásquez, PT Physical Therapist                  PT Recommendation and Plan  Anticipated Discharge Disposition (PT): home with /7 care, home with home health  Planned Therapy Interventions (PT): balance training, bed mobility training, gait training, home exercise program, manual therapy techniques, motor coordination training, neuromuscular re-education, patient/family education, postural re-education, strengthening, transfer training  Therapy Frequency (PT): 5 times/wk (5-6 times/wk)  Plan of Care Reviewed With: patient  Progress: no change   Outcome Measures     Row Name 10/15/22 1200             How much help from another is currently needed...    Putting on and taking off regular lower body clothing? 1  -AS      Bathing (including washing, rinsing, and drying) 2  -AS      Toileting (which includes using toilet bed pan or urinal) 2  -AS      Putting on and taking off regular upper body clothing 2  -AS      Taking care of personal grooming (such as brushing teeth) 3  -AS      Eating meals 3  -AS      AM-PAC 6 Clicks Score (OT) 13  -AS            User Key  (r) = Recorded By, (t) = Taken By, (c) = Cosigned By    Initials Name Provider Type    AS Priscilla Thakkar, OT Occupational Therapist                 Time Calculation:    PT Charges     Row Name 10/17/22 1312             Time Calculation    PT Received On 10/17/22  -CT         Time Calculation- PT    Total Timed Code Minutes- PT 42 minute(s)  -CT            User Key  (r) = Recorded By, (t) = Taken By, (c) = Cosigned By    Initials Name Provider Type    CT Bhargavi Vásquez PT Physical Therapist              Therapy Charges for Today      Code Description Service Date Service Provider Modifiers Qty    83560038192  GAIT TRAINING EA 15 MIN 10/17/2022 Bhargavi Vásquez, PT GP 1    83888908146 HC PT THERAPEUTIC ACT EA 15 MIN 10/17/2022 Bhargavi Vásquez, PT GP 1    98487212133  PT THER PROC EA 15 MIN 10/17/2022 Bhargavi Vásquez, PT GP 1          PT G-Codes  Outcome Measure Options: AM-PAC 6 Clicks Basic Mobility (PT)  AM-PAC 6 Clicks Score (PT): 10  AM-PAC 6 Clicks Score (OT): 13    Bhargavi Vásquez, PT  10/17/2022

## 2022-10-17 NOTE — PROGRESS NOTES
[facility]    Patient Identification:  Name:  Tre Kinney  Age:  89 y.o.  Sex:  female  :  1932  MRN:  [id number]  Visit number:  [id number]  Primary Care Provider:  Jessica Pedro APRN    Length of stay in inpatient status:  12    Brief Update     I saw the patient today.  The patient does not have any current complaints.  She denies any particular chest pain, nausea, vomiting, diarrhea, trouble breathing, and coughing.  Dr. Duron changed the patient's hydralazine on 10/15/2022 from 25 mg by mouth every 8 hours to 10 mg by mouth every 8 hours due to some hypotension.  The patient last received blood work today and the results are as follows:    LABS:    CBC and coagulation:  Results from last 7 days   Lab Units 10/17/22  0507 10/16/22  0135 10/15/22  0401   WBC 10*3/mm3 7.35 6.37 6.91   HEMOGLOBIN g/dL 9.8* 10.0* 10.0*   HEMATOCRIT % 30.9* 31.7* 31.4*   MCV fL 96.6 96.9 96.3   MCHC g/dL 31.7 31.5 31.8   PLATELETS 10*3/mm3 197 186 182     Renal and electrolytes:  Results from last 7 days   Lab Units 10/17/22  0507 10/16/22  0135 10/15/22  0401 10/12/22  0050 10/11/22  0304   SODIUM mmol/L 136 136 136   < > 136   POTASSIUM mmol/L 3.8 4.1 4.2   < > 3.6   MAGNESIUM mg/dL  --   --   --   --  2.2   CHLORIDE mmol/L 98 97* 97*   < > 98   CO2 mmol/L 27.7 26.3 26.5   < > 27.0   BUN mg/dL 34* 37* 37*   < > 36*   CREATININE mg/dL 1.98* 2.22* 2.26*   < > 2.11*   CALCIUM mg/dL 8.9 9.1 8.9   < > 9.0   GLUCOSE mg/dL 108* 113* 111*   < > 104*    < > = values in this interval not displayed.     Estimated Creatinine Clearance: 20.5 mL/min (A) (by C-G formula based on SCr of 1.98 mg/dL (H)).      Since labs are essentially stable at this point, I will obtain labs in a couple of days and perform orthostatic vital signs tomorrow.  The patient did walk 3 feet during transfer with a front wheeled walker when she participate with physical therapy today.    I received a message from   Erin; the son would like to see insurance decision on swing bed before he makes a final disposition decision.      Jannet Lagunas MD  [facility] Hospitalist  10/17/22  08:45 EDT

## 2022-10-17 NOTE — PLAN OF CARE
Goal Outcome Evaluation:  Plan of Care Reviewed With: patient           Outcome Evaluation: Pt alert and oriented. Pt sat in chair and was max assist back to bed. Pt has no new complaints. Will continue plan of care.

## 2022-10-17 NOTE — CASE MANAGEMENT/SOCIAL WORK
Followed up with [place] this am related to  submitted on 10/14 @ 08:38. Informed that the request is still in clinical review no determination at this time.

## 2022-10-18 PROCEDURE — 97530 THERAPEUTIC ACTIVITIES: CPT

## 2022-10-18 PROCEDURE — 97116 GAIT TRAINING THERAPY: CPT

## 2022-10-18 PROCEDURE — 97110 THERAPEUTIC EXERCISES: CPT

## 2022-10-18 PROCEDURE — 25010000002 ENOXAPARIN PER 10 MG

## 2022-10-18 PROCEDURE — 94761 N-INVAS EAR/PLS OXIMETRY MLT: CPT

## 2022-10-18 PROCEDURE — 94799 UNLISTED PULMONARY SVC/PX: CPT

## 2022-10-18 PROCEDURE — 97535 SELF CARE MNGMENT TRAINING: CPT

## 2022-10-18 RX ORDER — TAMSULOSIN HYDROCHLORIDE 0.4 MG/1
0.4 CAPSULE ORAL DAILY
Status: DISCONTINUED | OUTPATIENT
Start: 2022-10-18 | End: 2023-01-13 | Stop reason: HOSPADM

## 2022-10-18 RX ADMIN — Medication 10 ML: at 20:46

## 2022-10-18 RX ADMIN — ENOXAPARIN SODIUM 80 MG: 80 INJECTION SUBCUTANEOUS at 20:37

## 2022-10-18 RX ADMIN — Medication 10 ML: at 08:08

## 2022-10-18 RX ADMIN — Medication 10 MG: at 20:37

## 2022-10-18 RX ADMIN — Medication 10 ML: at 08:06

## 2022-10-18 RX ADMIN — TAMSULOSIN HYDROCHLORIDE 0.4 MG: 0.4 CAPSULE ORAL at 11:29

## 2022-10-18 RX ADMIN — Medication 10 ML: at 20:45

## 2022-10-18 RX ADMIN — GABAPENTIN 300 MG: 300 CAPSULE ORAL at 20:37

## 2022-10-18 RX ADMIN — ESCITALOPRAM 10 MG: 10 TABLET, FILM COATED ORAL at 08:06

## 2022-10-18 RX ADMIN — FUROSEMIDE 40 MG: 40 TABLET ORAL at 17:09

## 2022-10-18 RX ADMIN — HYDRALAZINE HYDROCHLORIDE 10 MG: 10 TABLET, FILM COATED ORAL at 13:23

## 2022-10-18 RX ADMIN — METOPROLOL TARTRATE 12.5 MG: 25 TABLET, FILM COATED ORAL at 08:06

## 2022-10-18 RX ADMIN — HYDRALAZINE HYDROCHLORIDE 10 MG: 10 TABLET, FILM COATED ORAL at 05:11

## 2022-10-18 RX ADMIN — METOPROLOL TARTRATE 12.5 MG: 25 TABLET, FILM COATED ORAL at 20:37

## 2022-10-18 RX ADMIN — FUROSEMIDE 40 MG: 40 TABLET ORAL at 08:06

## 2022-10-18 RX ADMIN — PANTOPRAZOLE SODIUM 40 MG: 40 TABLET, DELAYED RELEASE ORAL at 05:10

## 2022-10-18 NOTE — THERAPY TREATMENT NOTE
Acute Care - Physical Therapy Treatment Note   [place]     Patient Name: Tre Kinney  : 1932  MRN: [id number]  Today's Date: 10/18/2022   Onset of Illness/Injury or Date of Surgery: 10/06/22  Visit Dx:   No diagnosis found.  Patient Active Problem List   Diagnosis   • Acid reflux   • Ankle arthralgia   • Cardiac conduction disorder   • Closed fracture of distal fibula   • Arteriosclerosis of coronary artery   • Fracture of distal end of tibia   • Elevated cholesterol   • BP (high blood pressure)   • Hypertrophic polyarthritis   • OP (osteoporosis)   • Right heart failure (HCC)   • Heart failure, chronic, right-sided (HCC)   • SOB (shortness of breath)   • Palpitations   • CHF (congestive heart failure) (HCC)   • Congestive heart failure (HCC)   • Precordial pain   • Chronic anticoagulation   • Hypokalemia   • Anemia, unspecified type   • Generalized weakness     Past Medical History:   Diagnosis Date   • Anemia    • Arthritis    • CHF (congestive heart failure) (HCC)    • Chronic kidney disease    • Coronary artery disease    • H/O blood clots    • History of transfusion     no reaction    • Hypertension      Past Surgical History:   Procedure Laterality Date   • ABDOMINAL SURGERY     • APPENDECTOMY     • CARDIAC SURGERY     •  SECTION     • COLONOSCOPY N/A 3/7/2018    Procedure: COLONOSCOPY;  Surgeon: Willie Gresham MD;  Location:  [facility];  Service:    • CORONARY ANGIOPLASTY WITH STENT PLACEMENT     • ENDOSCOPY N/A 3/6/2018    Procedure: ESOPHAGOGASTRODUODENOSCOPY;  Surgeon: Willie Gresham MD;  Location:  [facility];  Service:    • PACEMAKER IMPLANTATION      left side    • SKIN BIOPSY     • TUBAL ABDOMINAL LIGATION       PT Assessment (last 12 hours)     PT Evaluation and Treatment     Row Name 10/18/22 1532          Physical Therapy Time and Intention    Subjective Information complains of;weakness;fatigue  -CT     Document Type therapy note (daily note)  -CT     Mode of  Treatment physical therapy  -CT     Patient Effort poor  -CT     Comment Pt tolerated treatment fair. Pt continues to attempt to sit early during transfer and remains Max/Dep x 2 for transfer. Pt performed sitting ther ex in bedside chair.  -CT     Row Name 10/18/22 1532          General Information    Patient Profile Reviewed yes  -CT     Existing Precautions/Restrictions fall;TLSO  pt continues to decline TLSO use  -CT     Limitations/Impairments safety/cognitive  -CT     Row Name 10/18/22 1532          Home Use of Assistive/Adaptive Equipment    Equipment Currently Used at Home wheelchair;commode;walker, rolling;oxygen  -CT     Row Name 10/18/22 1532          Cognition    Affect/Mental Status (Cognition) WFL  -CT     Orientation Status (Cognition) oriented x 3  -CT     Follows Commands (Cognition) WFL  -CT     Row Name 10/18/22 1532          Bed Mobility    Bed Mobility sit-supine;supine-sit;scooting/bridging;rolling left;rolling right  -CT     Rolling Left [place] (Bed Mobility) minimum assist (75% patient effort)  -CT     Rolling Right [place] (Bed Mobility) minimum assist (75% patient effort)  -CT     Scooting/Bridging [place] (Bed Mobility) minimum assist (75% patient effort)  -CT     Supine-Sit [place] (Bed Mobility) moderate assist (50% patient effort)  -CT     Sit-Supine [place] (Bed Mobility) moderate assist (50% patient effort);2 person assist;verbal cues;nonverbal cues (demo/gesture)  -CT     Bed Mobility, Safety Issues decreased use of arms for pushing/pulling;decreased use of legs for bridging/pushing;impaired trunk control for bed mobility  -CT     Assistive Device (Bed Mobility) bed rails;draw sheet  -CT     Row Name 10/18/22 1532          Transfers    Transfers bed-chair transfer;chair-bed transfer  -CT     Row Name 10/18/22 1532          Bed-Chair Transfer    Bed-Chair [place] (Transfers) maximum assist (25% patient effort);dependent (less than 25% patient effort);2  person assist  -CT     Assistive Device (Bed-Chair Transfers) walker, front-wheeled  -CT     Row Name 10/18/22 1532          Chair-Bed Transfer    Chair-Bed [place] (Transfers) maximum assist (25% patient effort);dependent (less than 25% patient effort);2 person assist  -CT     Assistive Device (Chair-Bed Transfers) walker, front-wheeled  -CT     Row Name 10/18/22 1532          Sit-Stand Transfer    Sit-Stand [place] (Transfers) moderate assist (50% patient effort);maximum assist (25% patient effort);2 person assist;verbal cues  -CT     Assistive Device (Sit-Stand Transfers) walker, front-wheeled  -CT     Row Name 10/18/22 1532          Stand-Sit Transfer    Stand-Sit [place] (Transfers) moderate assist (50% patient effort);2 person assist;verbal cues  -CT     Assistive Device (Stand-Sit Transfers) walker, front-wheeled  -CT     Row Name 10/18/22 1530          Gait/Stairs (Locomotion)    [place] Level (Gait) maximum assist (25% patient effort);dependent (less than 25% patient effort);2 person assist;verbal cues;nonverbal cues (demo/gesture)  -CT     Assistive Device (Gait) walker, front-wheeled  -CT     Distance in Feet (Gait) 2-3 ft during transfer to chair  -CT     Pattern (Gait) step-to  -CT     Deviations/Abnormal Patterns (Gait) gait speed decreased;festinating/shuffling;weight shifting decreased  -CT     Bilateral Gait Deviations weight shift ability decreased;forward flexed posture  -CT     Row Name 10/18/22 6963          Plan of Care Review    Plan of Care Reviewed With patient  -CT     Row Name 10/18/22 2574          Positioning and Restraints    Pre-Treatment Position in bed  -CT     Post Treatment Position chair  -CT     In Chair sitting;call light within reach;encouraged to call for assist  -CT     Row Name 10/18/22 5029          Therapy Assessment/Plan (PT)    Rehab Potential (PT) fair, will monitor progress closely  -CT     Criteria for Skilled Interventions Met (PT) yes;meets  criteria  -CT     Therapy Frequency (PT) 5 times/wk  5-6 times/wk  -CT           User Key  (r) = Recorded By, (t) = Taken By, (c) = Cosigned By    Initials Name Provider Type    CT Bhargavi Vásquez PT Physical Therapist                  PT Recommendation and Plan  Anticipated Discharge Disposition (PT): home with /7 care, home with home health  Planned Therapy Interventions (PT): balance training, bed mobility training, gait training, home exercise program, manual therapy techniques, motor coordination training, neuromuscular re-education, patient/family education, postural re-education, strengthening, transfer training  Therapy Frequency (PT): 5 times/wk (5-6 times/wk)  Plan of Care Reviewed With: patient  Progress: no change   Outcome Measures     Row Name 10/17/22 1400             How much help from another is currently needed...    Putting on and taking off regular lower body clothing? 1  -LA      Bathing (including washing, rinsing, and drying) 2  -LA      Toileting (which includes using toilet bed pan or urinal) 2  -LA      Putting on and taking off regular upper body clothing 2  -LA      Taking care of personal grooming (such as brushing teeth) 3  -LA      Eating meals 3  -LA      AM-PAC 6 Clicks Score (OT) 13  -LA         Functional Assessment    Outcome Measure Options AM-PAC 6 Clicks Daily Activity (OT)  -LA            User Key  (r) = Recorded By, (t) = Taken By, (c) = Cosigned By    Initials Name Provider Type    Dianna Barreto OT Occupational Therapist                 Time Calculation:    PT Charges     Row Name 10/18/22 1535             Time Calculation    PT Received On 10/18/22  -CT         Time Calculation- PT    Total Timed Code Minutes- PT 44 minute(s)  -CT            User Key  (r) = Recorded By, (t) = Taken By, (c) = Cosigned By    Initials Name Provider Type    Bhargavi Woody PT Physical Therapist              Therapy Charges for Today     Code Description Service Date Service Provider  Modifiers Qty    62860291352 HC GAIT TRAINING EA 15 MIN 10/17/2022 Bhargavi Vásquez, PT GP 1    69749491895 HC PT THERAPEUTIC ACT EA 15 MIN 10/17/2022 Bhargavi Vásquez, PT GP 1    82519029999 HC PT THER PROC EA 15 MIN 10/17/2022 Bhargavi Vásquez, PT GP 1    34200030607 HC GAIT TRAINING EA 15 MIN 10/18/2022 Bhargavi Vásquez, PT GP 1    92247647331 HC PT THERAPEUTIC ACT EA 15 MIN 10/18/2022 Bhargavi Vásquez, PT GP 1    47067095198 HC PT THER PROC EA 15 MIN 10/18/2022 Bhargavi Vásquez, PT GP 1          PT G-Codes  Outcome Measure Options: Modified [place]  AM-PAC 6 Clicks Score (PT): 10  AM-PAC 6 Clicks Score (OT): 13    Bhargavi Vásquez, PT  10/18/2022

## 2022-10-18 NOTE — PLAN OF CARE
Goal Outcome Evaluation:  Plan of Care Reviewed With: patient           Outcome Evaluation: Pt sat in chair for a couple hours. No acute changes at this time. Will continue plan of care.

## 2022-10-18 NOTE — PROGRESS NOTES
"Palliative Care Daily Progress Note     S: Medical record reviewed, followed up with Primary RN Nhi and Dr Lagunas as well as PT and social work regarding patient's condition. Palliative care visited the pt this am with Erin in SS. We discussed with her that she needed to show some improvement and to try her best to work with physical therapy. When asked how she felt she stated not good, when I further probed what was wrong, what didn't feel right, she could not tell me. She denied pain, nausea, shortness of breath, anxiety, fever or chills. She just seemed more down and out today.      O:   Palliative Performance Scale Score:     /56   Pulse 89   Temp 98 °F (36.7 °C) (Oral)   Resp 18   Ht 170.2 cm (67.01\")   Wt 76.2 kg (167 lb 14.4 oz)   SpO2 95%   BMI 26.29 kg/m²     Intake/Output Summary (Last 24 hours) at 10/18/2022 1632  Last data filed at 10/18/2022 0229  Gross per 24 hour   Intake 460 ml   Output 500 ml   Net -40 ml       PE:  General Appearance:    Pt still appears weak and more drowsy this am alert, cooperative, NAD   HEENT:    NC/AT, without obvious abnormality, EOMI, anicteric, nasal congestion present, however imptoved   Neck:   supple, trachea midline, no JVD   Lungs:     Regular even respirations, lungs clear, regular unlabored respirations    Heart:    Regular heart rate and rhythm, normal S1 and S2, no M/R/G   Abdomen:     Soft, NT, ND, NABS    Extremities:   Moves all extremities weakly, no edema   Pulses:   Pulses palpable and equal bilaterally   Skin:   Warm, dry   Neurologic:   A/Ox3, cooperative   Psych:   Calm, flat affect(appeared depressed)         Meds: Reviewed and changes noted.    Labs:   Results from last 7 days   Lab Units 10/17/22  0507   WBC 10*3/mm3 7.35   HEMOGLOBIN g/dL 9.8*   HEMATOCRIT % 30.9*   PLATELETS 10*3/mm3 197     Results from last 7 days   Lab Units 10/17/22  0507   SODIUM mmol/L 136   POTASSIUM mmol/L 3.8   CHLORIDE mmol/L 98   CO2 mmol/L 27.7   BUN " mg/dL 34*   CREATININE mg/dL 1.98*   GLUCOSE mg/dL 108*   CALCIUM mg/dL 8.9     Results from last 7 days   Lab Units 10/17/22  0507   SODIUM mmol/L 136   POTASSIUM mmol/L 3.8   CHLORIDE mmol/L 98   CO2 mmol/L 27.7   BUN mg/dL 34*   CREATININE mg/dL 1.98*   CALCIUM mg/dL 8.9   GLUCOSE mg/dL 108*     Imaging Results (Last 72 Hours)     ** No results found for the last 72 hours. **            Diagnostics: Reviewed    A: Tre Kinney is a 89 y.o.  female admitted on 10/5/2022 to [facility] for physical therapy for ten days. She was originally admitted to the hospital on 9/19/2022 due to a fall and symptomatic anemia. She has a medical history CHF, chronic kidney disease, coronary artery disease s/p stenting, history of blood clots, chronic anticoagulation with Coumadin, SSS s/p pacemaker placement, chronic diastolic CHF, and iron deficiency anemia who presents today with a chief complaint of a fall. Per pt she has become progressively more weak and has experienced several falls. Pt has sustained a moderate acute superior endplate vertebral compression fracture at the L1 with about 40% loss of vertebral body height and mild posterior osseous retropulsion of about 6 mm and also noted to have chronic compression fracture deformities involving the inferior endplates of L3 and L4 due to her first fall and from her second fall she does have a large hematoma on her right side, which led her to this admission. Pt had a hemoglobin of 6.1 for which she received 2 units of PRBC's. Pt denies any known source of bleeding and does say she had a scope(EGD colonoscopy at [facility] in 2018.  Today 10/18/2022  Temp 98, HR 89, RR 18, /70 and was saturating 95% on room air.    P:  Palliative care was able to touch base with patients son Leno to see if it was a possibility for he and his brothers to come in for a family meeting with palliative  and Dr Lagunas. Leno said that they could be here tomorrow 10/19 at 12:30.    We  will continue to follow along. Please do not hesitate to contact us regarding further sx mgmt or GOC needs, including after hours or on weekends via our on call provider at [phone].     Liyah Jaquez, APRN    10/18/2022

## 2022-10-18 NOTE — THERAPY TREATMENT NOTE
Patient Name: Tre Kinney  : 1932    MRN: [id number]                              Today's Date: 10/18/2022       Admit Date: 10/5/2022    Visit Dx: No diagnosis found.  Patient Active Problem List   Diagnosis   • Acid reflux   • Ankle arthralgia   • Cardiac conduction disorder   • Closed fracture of distal fibula   • Arteriosclerosis of coronary artery   • Fracture of distal end of tibia   • Elevated cholesterol   • BP (high blood pressure)   • Hypertrophic polyarthritis   • OP (osteoporosis)   • Right heart failure (HCC)   • Heart failure, chronic, right-sided (HCC)   • SOB (shortness of breath)   • Palpitations   • CHF (congestive heart failure) (HCC)   • Congestive heart failure (HCC)   • Precordial pain   • Chronic anticoagulation   • Hypokalemia   • Anemia, unspecified type   • Generalized weakness     Past Medical History:   Diagnosis Date   • Anemia    • Arthritis    • CHF (congestive heart failure) (HCC)    • Chronic kidney disease    • Coronary artery disease    • H/O blood clots    • History of transfusion     no reaction    • Hypertension      Past Surgical History:   Procedure Laterality Date   • ABDOMINAL SURGERY     • APPENDECTOMY     • CARDIAC SURGERY     •  SECTION     • COLONOSCOPY N/A 3/7/2018    Procedure: COLONOSCOPY;  Surgeon: Willie Gresham MD;  Location:  [facility];  Service:    • CORONARY ANGIOPLASTY WITH STENT PLACEMENT     • ENDOSCOPY N/A 3/6/2018    Procedure: ESOPHAGOGASTRODUODENOSCOPY;  Surgeon: Willie Gresham MD;  Location:  [facility];  Service:    • PACEMAKER IMPLANTATION      left side    • SKIN BIOPSY     • TUBAL ABDOMINAL LIGATION        General Information     Row Name 10/18/22 1253          OT Time and Intention    Document Type therapy note (daily note)  -     Mode of Treatment individual therapy;occupational therapy  -     Row Name 10/18/22 1255          General Information    Patient Profile Reviewed yes  -     Existing  Precautions/Restrictions fall;TLSO  -     Row Name 10/18/22 1253          Cognition    Orientation Status (Cognition) oriented x 3  -     Row Name 10/18/22 1253          Safety Issues, Functional Mobility    Impairments Affecting Function (Mobility) balance;strength;endurance/activity tolerance  -           User Key  (r) = Recorded By, (t) = Taken By, (c) = Cosigned By    Initials Name Provider Type     Linn Quevedo OT Occupational Therapist                 Mobility/ADL's     Row Name 10/18/22 1253          Bed Mobility    Bed Mobility scooting/bridging;supine-sit  -     Scooting/Bridging Stone (Bed Mobility) moderate assist (50% patient effort)  -     Supine-Sit Stone (Bed Mobility) moderate assist (50% patient effort)  -     Bed Mobility, Safety Issues decreased use of arms for pushing/pulling;decreased use of legs for bridging/pushing;impaired trunk control for bed mobility  -     Assistive Device (Bed Mobility) bed rails;draw sheet  -     Row Name 10/18/22 1253          Transfers    Transfers bed-chair transfer  -     Comment, (Transfers) moderate cues for sequencing and technique to maximize performance  -     Row Name 10/18/22 1253          Bed-Chair Transfer    Bed-Chair Stone (Transfers) maximum assist (25% patient effort);dependent (less than 25% patient effort);2 person assist  -     Assistive Device (Bed-Chair Transfers) walker, front-wheeled  -     Row Name 10/18/22 1253          Activities of Daily Living    BADL Assessment/Intervention grooming  -     Row Name 10/18/22 1253          Grooming Assessment/Training    Position (Grooming) sitting up in bed  -KP     Comment, (Grooming) oral hygiene (set-up), nail care (maximal assist), and washing face/hands (maximal assist) performed from sitting up in bed  -           User Key  (r) = Recorded By, (t) = Taken By, (c) = Cosigned By    Initials Name Provider Type    Linn Richardson OT Occupational  Therapist               Obj/Interventions     Row Name 10/18/22 1310 10/18/22 1259       Motor Skills    Motor Skills functional endurance  -KP --    Functional Endurance poor plus/ fair minus  -KP --    Therapeutic Exercise -- --  BUE AROM exercises X20 reps X2 sets from sitting up in bed  -          User Key  (r) = Recorded By, (t) = Taken By, (c) = Cosigned By    Initials Name Provider Type    Linn Richardson OT Occupational Therapist               Goals/Plan    No documentation.                Clinical Impression     Row Name 10/18/22 1310          Pain Assessment    Pretreatment Pain Rating 0/10 - no pain  -     Posttreatment Pain Rating 0/10 - no pain  -KP     Row Name 10/18/22 1310          Plan of Care Review    Plan of Care Reviewed With patient  -     Progress no change  -     Outcome Evaluation Patient seen for OT treatment session. She continues to required maximal assist to total assist for functional transfers. Maximal assist for grooming tasks as well from bed level.  -           User Key  (r) = Recorded By, (t) = Taken By, (c) = Cosigned By    Initials Name Provider Type    Linn Richardson OT Occupational Therapist               Outcome Measures     Row Name 10/18/22 1313          How much help from another is currently needed...    Putting on and taking off regular lower body clothing? 1  -KP     Bathing (including washing, rinsing, and drying) 2  -KP     Toileting (which includes using toilet bed pan or urinal) 2  -KP     Putting on and taking off regular upper body clothing 2  -KP     Taking care of personal grooming (such as brushing teeth) 3  -KP     Eating meals 3  -KP     AM-PAC 6 Clicks Score (OT) 13  -     Row Name 10/18/22 1313          Functional Assessment    Outcome Measure Options Modified [place]  -           User Key  (r) = Recorded By, (t) = Taken By, (c) = Cosigned By    Initials Name Provider Type    Linn Richardson OT Occupational Therapist                   OT Recommendation and Plan     Plan of Care Review  Plan of Care Reviewed With: patient  Progress: no change  Outcome Evaluation: Patient seen for OT treatment session. She continues to required maximal assist to total assist for functional transfers. Maximal assist for grooming tasks as well from bed level.     Time Calculation:    Time Calculation- OT     Row Name 10/18/22 1313             Time Calculation- OT    OT Received On 10/18/22  -KP         Timed Charges    10374 - OT Therapeutic Exercise Minutes 10  -KP      67856 - OT Therapeutic Activity Minutes 15  -KP      65942 - OT Self Care/Mgmt Minutes 14  -KP         Total Minutes    Timed Charges Total Minutes 39  -KP       Total Minutes 39  -KP            User Key  (r) = Recorded By, (t) = Taken By, (c) = Cosigned By    Initials Name Provider Type     Linn Quevedo OT Occupational Therapist              Therapy Charges for Today     Code Description Service Date Service Provider Modifiers Qty    30815510334 HC OT THER PROC EA 15 MIN 10/18/2022 Linn Quevedo OT GO 1    29689263316 HC OT THERAPEUTIC ACT EA 15 MIN 10/18/2022 Linn Quevedo OT GO 1    01568860527 HC OT SELF CARE/MGMT/TRAIN EA 15 MIN 10/18/2022 Linn Quevedo OT GO 1               Linn Quevedo OT  10/18/2022

## 2022-10-18 NOTE — CASE MANAGEMENT/SOCIAL WORK
Discharge Planning Assessment  [facility]     Patient Name: Tre Kinney  MRN: [id number]  Today's Date: 10/18/2022    Admit Date: 10/5/2022    Plan: Pt was admitted to swing bed on 10/5/22 for therapy services. SS spoke to pt on this date and she voices agreement to participate with therapy and voices wanting to return home at discharge. SS discussed pt with Palliative Care and they have scheduled a family meeting for tomorrow at 12:30. SS to follow.     Discharge Plan     Row Name 10/18/22 1835       Plan    Plan Pt was admitted to swing bed on 10/5/22 for therapy services. SS spoke to pt on this date and she voices agreement to participate with therapy and voices wanting to return home at discharge. SS discussed pt with Palliative Care and they have scheduled a family meeting for tomorrow at 12:30. SS to follow.              Continued Care and Services - Admitted Since 10/5/2022    Coordination has not been started for this encounter.     Selected Continued Care - Prior Encounters Includes continued care and service providers with selected services from prior encounters from 7/7/2022 to 10/18/2022    Discharged on 10/5/2022 Admission date: 9/19/2022 - Discharge disposition: Swing Bed    Destination     Service Provider Selected Services Address Phone Fax Patient Preferred     [facility] [address] [facility] [place] [id number] [phone] -- --                    LENORE Koch

## 2022-10-18 NOTE — PLAN OF CARE
Goal Outcome Evaluation:  Plan of Care Reviewed With: patient        Progress: no change  Outcome Evaluation: Pt seems to be resting well, no changes, will continue to monitor

## 2022-10-18 NOTE — PROGRESS NOTES
[facility] HOSPITALISTS CROSS COVER NOTE    Patient Identification:  Name:  Tre Kinney  Age:  89 y.o.  Sex:  female  :  1932  MRN:  [id number]  Visit number:  [id number]  Primary Care Provider:  Jessica Pedro APRN    Length of stay in inpatient status:  13    Brief Update     I saw the patient today.  She remains alert and oriented and does not have any nausea or vomiting.  She also denies chest pain and trouble breathing.  She denies coughing.  The patient did attempt to sit in a chair and ambulate yesterday; however, the patient had a sudden bowel movement in the chair and became very embarrassed and did not want to continue with physical therapy at that time.  I reassured the patient that this was normal and that we would try again today.    Please note that  and palliative care team have arranged a family meeting at 1230 tomorrow to see what the plan is if the patient does receive more days of swing bed by insurance approval.     I will not obtain any labs tomorrow as they were stable for the last few days and she has no acute issues currently.      Jannet Lagunas MD  [facility] Hospitalist  10/18/22  10:10 EDT

## 2022-10-19 PROCEDURE — 97110 THERAPEUTIC EXERCISES: CPT

## 2022-10-19 PROCEDURE — 99308 SBSQ NF CARE LOW MDM 20: CPT | Performed by: INTERNAL MEDICINE

## 2022-10-19 PROCEDURE — 97116 GAIT TRAINING THERAPY: CPT

## 2022-10-19 PROCEDURE — 25010000002 ENOXAPARIN PER 10 MG

## 2022-10-19 PROCEDURE — 97530 THERAPEUTIC ACTIVITIES: CPT

## 2022-10-19 RX ADMIN — HYDRALAZINE HYDROCHLORIDE 10 MG: 10 TABLET, FILM COATED ORAL at 06:05

## 2022-10-19 RX ADMIN — GABAPENTIN 300 MG: 300 CAPSULE ORAL at 21:08

## 2022-10-19 RX ADMIN — FUROSEMIDE 40 MG: 40 TABLET ORAL at 07:19

## 2022-10-19 RX ADMIN — DOCUSATE SODIUM 100 MG: 100 CAPSULE ORAL at 07:19

## 2022-10-19 RX ADMIN — HYDRALAZINE HYDROCHLORIDE 10 MG: 10 TABLET, FILM COATED ORAL at 13:02

## 2022-10-19 RX ADMIN — PANTOPRAZOLE SODIUM 40 MG: 40 TABLET, DELAYED RELEASE ORAL at 06:05

## 2022-10-19 RX ADMIN — Medication 10 MG: at 21:08

## 2022-10-19 RX ADMIN — HYDRALAZINE HYDROCHLORIDE 10 MG: 10 TABLET, FILM COATED ORAL at 21:08

## 2022-10-19 RX ADMIN — ESCITALOPRAM 10 MG: 10 TABLET, FILM COATED ORAL at 07:19

## 2022-10-19 RX ADMIN — ENOXAPARIN SODIUM 80 MG: 80 INJECTION SUBCUTANEOUS at 21:09

## 2022-10-19 RX ADMIN — METOPROLOL TARTRATE 12.5 MG: 25 TABLET, FILM COATED ORAL at 21:08

## 2022-10-19 RX ADMIN — METOPROLOL TARTRATE 12.5 MG: 25 TABLET, FILM COATED ORAL at 07:19

## 2022-10-19 RX ADMIN — DOCUSATE SODIUM 100 MG: 100 CAPSULE ORAL at 21:08

## 2022-10-19 RX ADMIN — FUROSEMIDE 40 MG: 40 TABLET ORAL at 16:14

## 2022-10-19 RX ADMIN — TUBERCULIN PURIFIED PROTEIN DERIVATIVE 5 UNITS: 5 INJECTION, SOLUTION INTRADERMAL at 15:40

## 2022-10-19 RX ADMIN — TAMSULOSIN HYDROCHLORIDE 0.4 MG: 0.4 CAPSULE ORAL at 07:19

## 2022-10-19 NOTE — CASE MANAGEMENT/SOCIAL WORK
Discharge Planning Assessment  [facility]     Patient Name: Tre Kinney  MRN: [id number]  Today's Date: 10/19/2022    Admit Date: 10/5/2022    Plan: Pt was admitted to [facility] bed on 10/5/22 for therapy services. [facility] RN states pt has been approved through 10/21/22. Palliative Care and Dr. Lagunas had a meeting with pt's two son's on this date. Pt's family is in agreement to short-term rehab nursing home placement. SS contacted pt's sonLeno and pt's daughter in law, Chantel Rogers on this date. Pt's family voices agreement for SS to start short-term rehab at [facility]. [facility] informed SS prior to swing bed admission that they are out of network with pt's insurance. SS sent nursing home referral to [facility] and notified [facility], [facility] and notified [facility] and notified [facility]  to follow.     Discharge Plan     Row Name 10/19/22 1544       Plan    Plan Pt was admitted to swing bed on 10/5/22 for therapy services. Swing bed RN states pt has been approved through 10/21/22. Palliative Care and Dr. Lagunas had a meeting with pt's two son's on this date. Pt's family is in agreement to short-term rehab nursing home placement. SS contacted pt's sonLeno and pt's daughter in law, Chantel Rogers on this date. Pt's family voices agreement for SS to start short-term rehab at [facility]. [facility] that they are out of network with pt's insurance. SS sent nursing home referral to [facility] and notified [facility], [facility] and notified [facility] and notified [facility]  to follow.                Continued Care and Services - Admitted Since 10/5/2022     Destination     Service Provider Request Status Selected Services Address Phone Fax Patient Preferred    [facility] Pending - Request Sent N/A [address], [place] [address]  [phone] --    [facility] Pending - Request Sent N/A [address] [place] [address] [phone] --    [facility] Pending - Request Sent N/A [address] [place] [address] [phone] --            Selected Continued Care - Prior Encounters Includes continued care and service providers with selected services from prior encounters from 7/7/2022 to 10/19/2022    Discharged on 10/5/2022 Admission date: 9/19/2022 - Discharge disposition: Swing Bed    Destination     Service Provider Selected Services Address Phone Fax Patient Preferred    [facility] [address] [place] [address] [phone] -- --                    LENORE Koch

## 2022-10-19 NOTE — PLAN OF CARE
Goal Outcome Evaluation:  Plan of Care Reviewed With: patient        Progress: no change  Outcome Evaluation: Pt resting in bed. Vital signs stable. No acute changes. Will continue plan of care.

## 2022-10-19 NOTE — PLAN OF CARE
Goal Outcome Evaluation:               Pt resting with no complaints at this time. Pt up to chair this shift.

## 2022-10-19 NOTE — DISCHARGE PLACEMENT REQUEST
"Tre Kinney (89 y.o. Female)     Date of Birth   1932    Social Security Number       Address   [address] [address]    Home Phone   [phone]    MRN   [id number]       Mormonism   Mandaen    Marital Status                               Admission Date   10/5/22    Admission Type   Urgent    Admitting Provider   Jannet Lagunas MD    Attending Provider   Jannet Lagunas MD    Department, Room/Bed   [address], 3336/1P       Discharge Date       Discharge Disposition       Discharge Destination                               Attending Provider: Jannet Lagunas MD    Allergies: Adhesive Tape, Xanax [Alprazolam]    Isolation: None   Infection: None   Code Status: No CPR    Ht: 170.2 cm (67.01\")   Wt: 74.2 kg (163 lb 8 oz)    Admission Cmt: None   Principal Problem: None                Active Insurance as of 10/5/2022     Primary Coverage     Payor Plan Insurance Group Employer/Plan Group    ANTHEM MEDICARE REPLACEMENT ANTH MEDICARE ADVANTAGE [id number]     Payor Plan Address Payor Plan Phone Number Payor Plan Fax Number Effective Dates    [address] [phone]  2022 - None Entered    [facility] [id number]       Subscriber Name Subscriber Birth Date Member ID       TRE KINNEY 1932 [id number]                 Emergency Contacts      (Rel.) Home Phone Work Phone Mobile Phone    Valdez Kinney (Son) [phone] -- --    Leno Kinney (Son) [phone] -- [phone]               History & Physical      Jannet Lagunas MD at 10/05/22 [facility]              [facility] Medicine Services  History & Physical    Patient Identification:  Name:  Tre Kinney  Age:  89 y.o.  Sex:  female  :  1932  MRN:  [id number]   Visit Number:  [id number]  Primary Care Physician:  Jessica Pedro APRN      Please note [facility] swing bed service for generalized debility as she needs strengthening.  The following is a summary of the  hospitalization:      Date of Admission: 9/19/2022  Date of Discharge: 10/5/2022, to [facility]     PCP: Jessica Pedro APRN     DISCHARGE DIAGNOSIS  -Acute blood loss anemia that was present on admission and due to a right hip traumatic hematoma in the setting of the supratherapeutic INR, status post 3 units packed red blood cells transfused this admission  -Supratherapeutic INR  that was present on admission, in a patient on chronic anticoagulation with Coumadin  for paroxysmal atrial fibrillation (goal INR 2.0-3.0), nail anticoagulation has been discontinued  -Acute superior endplate compression fracture of L1 with retropulsion (9/8/2022)  -Leukocytosis on admission, suspect due to mechanical fall that occurred prior to admission  -Acute kidney injury on top of chronic kidney disease stage IV (baseline creatinine 2.1-2.4 and admission creatinine 6.1)  -Anion gap metabolic acidosis present on admission, suspect due to a combination of lactic acidosis from the acute blood loss anemia and renal acidosis  -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 Vazquez and SANDRA Jaquez with palliative care  Dr. Sanchez with nephrology  Dr. Bell with general surgery  Dr. Ho and SANDRA Xiong with orthopedic surgery     PROCEDURES PERFORMED  9/19/2022: Transfusion of two units of PRBCs on each day  9/21/2022: Transfusion of one unit of PRBCs  on each day  9/19/2022-9/25/2022:  Left cephalic single lumen midline catheter     HOSPITAL COURSE  Patient is a  89 y.o.  female presented to [facility] complaining of a mechanical fall with inability to get up.  In our emergency department, the patient was noted to have a hemoglobin level of 6.3.  Thus, she was admitted to the progressive care unit for further evaluation and treatment.  Please see the admitting history and physical for further details.  On initial exam, a large right hip hematoma was seen and was suspected to be the cause of the acute blood loss.  She was given Kcentra in the emergency department as well as vitamin K.  She ended up needing 3 units during the first 3 days of her hospitalization.  Afterwards, her hemoglobin was stable and she did not need any further treatment.  The hematoma seem to be resolving daily during her hospitalization.  Since the patient had a diverticular bleed in 2018 and has re-bled again now to the point of acute blood loss anemia, it has been determined that the risk of anticoagulation outweighs the benefit in this patient will be discharged without any anticoagulation.    Also, the patient had DIANA on top CKD stage IV on admission; nephrology was consulted.  With IV fluids (the initial ones containing bicarb) and blood transfusions, her renal function stabilized to normal and she will just need follow up with nephrology once she is out of this hospital.     Incidentally, the patient had a vertebral fracture that we believe occurred earlier this month.  Also, there was question of a right distal tib fracture and as such orthopedic surgery was consulted.  No acute fractures were present per their evaluation and the patient was instructed to ambulate and weight-bear as tolerated.  PT and OT saw the patient and she is severely weak; thus, she will be admitted to swing bed for strengthening.     The patient reported dysuria later in the hospitalization; UA was  abnormal but no bacteria grew on the culture.  Our team started Rocephin and this was continued even after the urine culture did not grow because her dysuria resolved.  Please note that the patient finished a course of Rocephin for an acute UTI while she was hospitalized.       During the end of her hospitalization, she had dyspnea.  She was on Lasix at home and chest x-ray here showed some mild CHF.  Thus, we gave the patient some doses of IV Bumex and then increase the home Lasix from 40 mg once a day to 40 mg twice a day.  We will periodically check her blood work to see how the creatinine and the electrolytes are trending.  Please note that we did obtain a Reds vest measurement and it was elevated prior to the doses of IV Bumex.  However, today's Reds vest measurement indicates that her lungs are now dried out.            ----------------------------------------------------------------------------------------------------------------------    The following is the history and physical for the [facility] hospitalization that is summarized above:  Now  Subjective     Chief complaint:  Fall    History of presenting illness:  Tre Kinney is a 89 y.o. female with past medical history significant for CHF, chronic kidney disease, coronary artery disease s/p stenting, history of blood clots, chronic anticoagulation with Coumadin, SSS s/p pacemaker placement, chronic diastolic CHF, and iron deficiency anemia who presents today with a chief complaint of a fall.     Patient reports that she has had 2 falls within the past month.  For the first fall she was evaluated in the emergency department on September 8, 2022 and was complaining of pain in her bilateral lower back and abdomen.  On CT imaging of her lumbar spine she had a moderate acute superior endplate vertebral compression fracture at the L1 with about 40% loss of vertebral body height and mild posterior osseous retropulsion of about 6 mm and also noted  to have chronic compression fracture deformities involving the inferior endplates of L3 and L4.  She was discharged home with a 3-day supply of Norco 7.5 mg, a back brace, and instructions to follow-up with Dr. Krishna in the outpatient setting. Patient reports that she wasn't able to attend her follow-up with orthopedic surgery as she lives with her son who has no vehicle and has no other form of transportation to make it to her doctor's appointments.     She presents today after having a mechanical fall while attempting to get to her telephone that was across the room in her bedroom. She states that she is not for sure what caused her to fall, but landed primarily on her right side. She denies any syncope, dizziness, light-headedness, chest pain, or shortness of breath that contributed to her fall. She does ambulate with a walker at home. She states that her other son that lives close by was able to come over and assist her out of the floor. EMS was called and transported her to the hospital. She was given Norco 5 mg in the ED and reports that she is currently not in any pain. It was noted on her labs that her hemoglobin was 6.1 upon arrival. She does have a large hematoma on her right side from her fall. She states she has also been having dark appearing stools at home. She denies any hematemesis or coffee ground emesis. She states her last EGD/colonoscopy was in 2018 when she was admitted to [facility] which is detailed below. She did state that she has had frequent episodes of midsternal chest pain mostly while lying in the bed at night. She states that it will radiate to bilateral arms and she has associated nausea and diaphoresis. She reports that she will take a nitroglycerin which will relieve her chest pain. She also endorses dysuria and foul-smelling urine. She denies any abdominal pain, nausea, vomiting, or diarrhea. She is currently alert and oriented to self, place, date of birth, year, and president. She  currently resides with her son who assists her with her medications.    Of note, patient was transferred from [facility] to [facility] in March of 2018 due to symptomatic acute blood loss anemia due to rectal bleeding.  She was also on Coumadin at that time for atrial fibrillation and prior blood clots.  GI services was consulted and she underwent an EGD and colonoscopy during that stay.  Her EGD showed no active bleeding but evidence of gastritis.  Colonoscopy revealed a few polyps that were not removed or biopsied  And also found to have diverticulosis and likely diverticular bleed since resolved.  She did receive 2 units packed red blood cells with no further bleeding noted.  Her Coumadin was held throughout her hospital stay and 3 days after discharge, but was resumed per PCP recommendations with them to manage Coumadin and on orders.    Upon arrival to the ED, vital signs were temperature 97.7, heart rate 87, respirations 18, blood pressure 127/60, SPO2 97% on room air.  CMP with glucose 137, sodium 130, potassium 5.5, CO2 17, chloride 93, anion gap 20, creatinine 6.11, BUN 31, EGFR 6.1, albumin 3.43, otherwise unremarkable.  CBC with WBC 15.87, RBC 2.16, hemoglobin 6.3, hematocrit 19.6, RDW 17, RDW-SD 55.5, otherwise unremarkable.    Known Emergency Department medications received prior to my evaluation included IV Rocephin, Norco 5 mg, IV Zofran 4 mg, IV phytonadione.     Patient will be admitted to the PCU for further evaluation and monitoring.    ---------------------------------------------------------------------------------------------------------------------   Review of Systems   Constitutional: Positive for diaphoresis (associated with chest pain). Negative for activity change, chills and fever.   HENT: Negative for congestion and rhinorrhea.    Eyes: Negative for discharge and redness.   Respiratory: Negative for apnea, cough and shortness of breath.    Cardiovascular:  Positive for chest pain (midsternal; radiates to bilateral arms). Negative for palpitations.   Gastrointestinal: Positive for nausea (associated with chest pain). Negative for abdominal distention, abdominal pain, diarrhea and vomiting.   Genitourinary: Positive for dysuria. Negative for difficulty urinating, frequency and urgency.        Reports foul smelling urine   Musculoskeletal: Positive for gait problem (uses walker to ambulate). Negative for arthralgias and myalgias.   Skin: Positive for color change (bruising from recent falls). Negative for wound.   Neurological: Negative for dizziness, syncope and light-headedness.   Psychiatric/Behavioral: Negative for agitation, behavioral problems and confusion.        ---------------------------------------------------------------------------------------------------------------------   Past Medical History:   Diagnosis Date   • Anemia    • Arthritis    • CHF (congestive heart failure) (HCC)    • Chronic kidney disease    • Coronary artery disease    • H/O blood clots    • History of transfusion     no reaction    • Hypertension      Past Surgical History:   Procedure Laterality Date   • ABDOMINAL SURGERY     • APPENDECTOMY     • CARDIAC SURGERY     •  SECTION     • COLONOSCOPY N/A 3/7/2018    Procedure: COLONOSCOPY;  Surgeon: Willie Gresham MD;  Location:  [facility];  Service:    • CORONARY ANGIOPLASTY WITH STENT PLACEMENT     • ENDOSCOPY N/A 3/6/2018    Procedure: ESOPHAGOGASTRODUODENOSCOPY;  Surgeon: Willie Gresham MD;  Location:  [facility];  Service:    • PACEMAKER IMPLANTATION      left side    • SKIN BIOPSY     • TUBAL ABDOMINAL LIGATION       Family History   Problem Relation Age of Onset   • Other Mother         cardiac disorder   • Hypertension Mother    • Heart disease Mother    • Thyroid disease Mother    • Other Father         cardiac disorder   • Heart disease Father    • Hypertension Father    • Other Sister         cardiac disorder    • Other Brother         cardiac disorder   • Other Other         cardiac disorder   • Other Other         cardiac disorder     Social History     Socioeconomic History   • Marital status:    Tobacco Use   • Smoking status: Never Smoker   Substance and Sexual Activity   • Alcohol use: No   • Drug use: No   • Sexual activity: Defer     ---------------------------------------------------------------------------------------------------------------------   Allergies:  Adhesive tape and Xanax [alprazolam]  ---------------------------------------------------------------------------------------------------------------------   Home medications:   Current listed hospital scheduled medications may not yet reflect those currently placed in orders that are signed and held awaiting patient's arrival to floor.   ---------------------------------------------------------------------------------------------------------------------      Objective      Vital Signs:  Temp:  [97.5 °F (36.4 °C)-98.1 °F (36.7 °C)] 98.1 °F (36.7 °C)  Heart Rate:  [71-93] 87  Resp:  [16-20] 20  BP: (114-157)/(49-87) 127/58  Vitals             09/19/22  1052   Weight: 72.6 kg (160 lb)         Body mass index is 25.06 kg/m².  ---------------------------------------------------------------------------------------------------------------------       Physical Exam  Constitutional:       General: She is awake.      Appearance: Normal appearance. She is overweight.      Interventions: Nasal cannula in place.      Comments: Resting comfortably in bed. Appears in no acute distress. Nasal cannula in place.    HENT:      Head: Normocephalic and atraumatic.      Right Ear: External ear normal.      Left Ear: External ear normal.      Nose: Nose normal.      Mouth/Throat:      Mouth: Mucous membranes are dry.      Pharynx: Oropharynx is clear.   Eyes:      Extraocular Movements: Extraocular movements intact.      Conjunctiva/sclera: Conjunctivae normal.       Pupils: Pupils are equal, round, and reactive to light.   Cardiovascular:      Rate and Rhythm: Normal rate and regular rhythm.      Pulses: Normal pulses.           Dorsalis pedis pulses are 2+ on the right side and 2+ on the left side.        Posterior tibial pulses are 2+ on the right side and 2+ on the left side.      Heart sounds: Normal heart sounds. No murmur heard.    No friction rub. No gallop.   Pulmonary:      Effort: Pulmonary effort is normal. No respiratory distress.      Breath sounds: Normal breath sounds. No wheezing, rhonchi or rales.      Comments: Nasal cannula placed for comfort; saturations currently 97%.   Abdominal:      General: Abdomen is flat. Bowel sounds are normal. There is no distension.      Palpations: Abdomen is soft.      Tenderness: There is no abdominal tenderness. There is no guarding.   Musculoskeletal:         General: Normal range of motion.      Right lower leg: No edema.      Left lower leg: No edema.   Skin:     General: Skin is warm and dry.      Coloration: Skin is pale.      Findings: Bruising present. No erythema.          Neurological:      General: No focal deficit present.      Mental Status: She is alert and oriented to person, place, and time. Mental status is at baseline.      Comments: Alert and oriented x 4. No focal deficits noted.    Psychiatric:         Mood and Affect: Mood normal.         Behavior: Behavior normal. Behavior is cooperative.         Thought Content: Thought content normal.         ---------------------------------------------------------------------------------------------------------------------  EKG:            Telemetry:       I have personally looked at both the EKG and the telemetry strips.  ---------------------------------------------------------------------------------------------------------------------     Results from last 7 days   Lab Units 09/19/22  1620 09/19/22  2124 09/19/22  1608 09/19/22  1521   LACTATE mmol/L  --  1.3 2.3*   --    WBC 10*3/mm3  --   --   --  15.87*   HEMOGLOBIN g/dL  --   --   --  6.3*   HEMATOCRIT %  --   --   --  19.6*   MCV fL  --   --   --  90.7   MCHC g/dL  --   --   --  32.1   PLATELETS 10*3/mm3  --   --   --  250   INR   >10.00*  --   --   --            Results from last 7 days   Lab Units 09/19/22  1521   SODIUM mmol/L 130*   POTASSIUM mmol/L 5.5*   CHLORIDE mmol/L 93*   CO2 mmol/L 17.0*   BUN mg/dL 91*   CREATININE mg/dL 6.11*   CALCIUM mg/dL 9.0   GLUCOSE mg/dL 137*   ALBUMIN g/dL 3.43*   BILIRUBIN mg/dL 0.9   ALK PHOS U/L 77   AST (SGOT) U/L 19   ALT (SGPT) U/L 8   Estimated Creatinine Clearance: 7.2 mL/min (A) (by C-G formula based on SCr of 6.11 mg/dL (H)).         Results from last 7 days   Lab Units 09/19/22  1521   TROPONIN T ng/mL 0.036*           Results from last 7 days   Lab Units 09/19/22  1521   PROBNP pg/mL 11,666.0*            Lab Results   Component Value Date     TSH 4.999 (H) 11/10/2017      ---------------------------------------------------------------------------------------------------------------------  Imaging Results (Last 7 Days)      Procedure Component Value Units Date/Time     CT Abdomen Pelvis Without Contrast [[id number]] Collected: 09/19/22 2205       Updated: 09/19/22 2207     Narrative:       CT Abdomen Pelvis WO     INDICATION:   Abdominal pain.     TECHNIQUE:   CT of the abdomen and pelvis without IV contrast. Coronal and sagittal reconstructions were obtained.  Radiation dose reduction techniques included automated exposure control or exposure modulation based on body size. Count of known CT and cardiac nuc  med studies performed in previous 12 months: 2.      COMPARISON:   None available.     FINDINGS:  Abdomen: There is mild scarring and volume loss at the left lung base. There is a moderate-sized hiatal hernia. The liver contains a small cyst or hemangioma in the right lobe measuring 1.5 cm in diameter with a similar benign-appearing lesion in the  lateral segment of the  left lobe just under the capsule measuring 1 cm. The spleen and pancreas are normal in size. There is bilateral renal atrophy, worse on the right. There is a 3.5 cm right renal cyst. No hydronephrosis. The adrenal glands are normal  in size. No evidence of retroperitoneal adenopathy. No distended bowel loops.     Pelvis: No appendix is seen. No inflammatory changes are seen around the cecum. There is sigmoid diverticulosis. No acute inflammatory changes are seen. No enlarged pelvic lymph nodes are seen. There is subcutaneous streaking of fat in the right gluteal  area that could represent either inflammatory change or hematoma. It is new since a pelvic CT on 922.        Impression:       Subcutaneous swelling in the gluteal area most likely representing a gluteal hematoma. This is a new finding since the recent pelvic CT. No acute or inflammatory changes are seen elsewhere in the abdomen or pelvis. Also noted is a hiatal hernia, renal  atrophy and diverticulosis.              Signer Name: Bubba Mancuso MD   Signed: 9/19/2022 10:05 PM   Workstation Name: [id number]    [facility]     CT Lower Extremity Right Without Contrast [[id number]] Collected: 09/19/22 1440       Updated: 09/19/22 1443     Narrative:       EXAMINATION: CT LOWER EXTREMITY RIGHT WO CONTRAST-      CLINICAL INDICATION: tib/fib (questionable tibial plateau and distal  tibia fracture fracture on x-ray)        COMPARISON: X-rays of the right lower extremity dated 09/19/2022     Radiation dose reduction techniques were utilized per ALARA protocol.  Automated exposure control was initiated through either or CareDose or  DoseRight software packages by  protocol.           PROCEDURE: Axial images were acquired from the distal femur through the  ankle without any IV contrast. Reformatted images were created.     FINDINGS:  There is joint space narrowing at the knee.  Irregularity of the lateral tibial plateau probably due  to an old injury  and or arthritic change, but no convincing evidence of acute fracture on  the presented study     Otherwise no evidence of acute fracture.        Impression:       1. Extensive arthritic change at the knee, but no acute fracture is seen        This report was finalized on 9/19/2022 2:41 PM by Dr. Bob Lovell MD.        XR Hip With or Without Pelvis 2 - 3 View Right [[id number]] Collected: 09/19/22 1306       Updated: 09/19/22 1332     Narrative:       EXAM:    XR Right Hip With Pelvis When Performed, 2 or 3 Views     EXAM DATE:    9/19/2022 12:11 PM     CLINICAL HISTORY:    trauma     TECHNIQUE:    Two or three views of the right hip with pelvis when performed.     COMPARISON:    No relevant prior studies available.     FINDINGS:    Bones/joints:  Unremarkable.  No acute fracture.  No dislocation.    Soft tissues:  Unremarkable.        Impression:         No acute findings in the right hip.     This report was finalized on 9/19/2022 1:07 PM by Dr. Bob Lovell MD.        XR Femur 2 View Right [[id number]] Collected: 09/19/22 1306       Updated: 09/19/22 1308     Narrative:       EXAM:    XR Right Femur, 2 Views     EXAM DATE:    9/19/2022 12:12 PM     CLINICAL HISTORY:    fall     TECHNIQUE:    Frontal and lateral views of the right femur.     COMPARISON:    No relevant prior studies available.     FINDINGS:    Bones/joints:  Unremarkable.  No acute fracture.  No dislocation.    Soft tissues:  Unremarkable.        Impression:         No acute findings in the right femur.     This report was finalized on 9/19/2022 1:06 PM by Dr. Bob Lovell MD.        XR Tibia Fibula 2 View Right [[id number]] Collected: 09/19/22 1305       Updated: 09/19/22 1308     Narrative:       EXAM:    XR Right Tibia and Fibula, 2 Views     EXAM DATE:    9/19/2022 12:11 PM     CLINICAL HISTORY:    fall     TECHNIQUE:    Frontal and lateral views of the right tibia and fibula.     COMPARISON:    No relevant prior studies  available.     FINDINGS:    Bones/joints:  Mild irregularity of the lateral tibial plateau. Cannot  exclude nondisplaced fracture.  No dislocation.    Soft tissues:  Unremarkable.  No radiopaque foreign body.    Other findings:  Demineralization.        Impression:         Mild irregularity of the lateral tibial plateau. Cannot exclude  nondisplaced fracture.     This report was finalized on 9/19/2022 1:06 PM by Dr. Bob Lovell MD.        XR Ankle 3+ View Right [[id number]] Collected: 09/19/22 1304       Updated: 09/19/22 1307     Narrative:       EXAM:    XR Right Ankle Complete, 3 or More Views     EXAM DATE:    9/19/2022 12:11 PM     CLINICAL HISTORY:    fall     TECHNIQUE:    Frontal, lateral and oblique views of the right ankle.     COMPARISON:    No relevant prior studies available.     FINDINGS:    Bones/joints:  Oblique lucency through the distal tibia medially may  represent a nondisplaced fracture.  Old fracture of the distal fibula.   No dislocation.    Soft tissues:  Unremarkable.        Impression:         Oblique lucency through the distal tibia medially may represent a  nondisplaced fracture.     This report was finalized on 9/19/2022 1:05 PM by Dr. Bob Lovell MD.        XR Foot 3+ View Right [[id number]] Collected: 09/19/22 1304       Updated: 09/19/22 1306     Narrative:       EXAM:    XR Right Foot Complete, 3 or More Views     EXAM DATE:    9/19/2022 12:11 PM     CLINICAL HISTORY:    fall     TECHNIQUE:    Frontal, lateral and oblique views of the right foot.     COMPARISON:    No relevant prior studies available.     FINDINGS:    Bones/joints:  Unremarkable.  No acute fracture.  No dislocation.    Soft tissues:  Unremarkable.  No radiopaque foreign body.        Impression:         No acute findings in the right foot.     This report was finalized on 9/19/2022 1:04 PM by Dr. Bob Lovell MD.          Last echocardiogram:  Results for orders placed during the hospital encounter of  09/19/22    Adult Transthoracic Echo Complete W/ Cont if Necessary Per Protocol    Interpretation Summary  · Left ventricular ejection fraction appears to be 51 - 55%. Left ventricular systolic function is low normal.  · Left ventricular wall thickness is consistent with mild concentric hypertrophy.  · Left ventricular diastolic function was indeterminate.  · The right ventricular cavity is moderately dilated.  · Moderately reduced right ventricular systolic function noted.  · The left atrial cavity is moderate to severely dilated  · The right atrial cavity is moderately dilated.  · Moderate tricuspid valve regurgitation is present.  · Estimated right ventricular systolic pressure from tricuspid regurgitation is markedly elevated (>55 mmHg).  · Severe pulmonary hypertension is present.  · Electronic lead present in the ventricle  · There is no evidence of pericardial effusion    I have personally reviewed the above radiology images and read the final radiology report on 10/05/22  ---------------------------------------------------------------------------------------------------------------------  Assessment / Plan     ASSESSMENT/PLAN:  -Acute life-threatening anemia likely secondary to large gluteal hematoma, POA  -History of acute blood loss anemia in 2018 suspected to be from diverticular bleed   -History of iron deficiency anemia   -Hemoglobin and hematocrit upon arrival 6.3 and 19.6 respectively; previously during ED visit on 09/08 hemoglobin 12.4 and hematocrit 38.8.   -Last colonoscopy/EGD in 2018 during [facility] admission. EGD showed no active bleeding but evidence of gastritis; Colonoscopy revealed a few polyps that were not removed or biopsied, also found to have diverticulosis and likely diverticular bleed since resolved.  -Reports episodes of melena at home; will monitor if patient has additional episodes of melena during admission.   -ED provider ordered 2 U PRBC's; nursing communication in chart to order  repeat CBC after first unit of blood.   -Obtain iron, vitamin B12, and folate.    -Possible acute, nondisplaced, right lateral tibial plateau and distal tibia fractures, POA  -Traumatic right gluteal hematoma, POA  -Acute superior endplate compression fracture of L1 with retropulsion (09/08/22)  -Leukocytosis, likely reactive to fall  -Mechanical fall  -XR of right lower extremity with findings noted above.   -CT abdomen/pelvis with subcutaneous swelling swelling in gluteal area representing gluteal hematoma.   -Vertebral fracture found during 09/08/22 ED visit 2/2 fall and was discharged home with back brace with instruction to follow-up with orthopedic surgery, but missed appointment due to transportation.   -Inpatient orthopedic and general surgery consulted, input and assistance much appreciated.   -Will keep NPO pending surgical evaluation.   -Up with assistance, fall precautions.   -Neurovascular checks every 4 hours.   -PT/OT consults ordered.     -Acute on chronic kidney disease stage IV, POA  -Bilateral renal atrophy, R > L   -Right renal cyst (3.5 cm)  -Anion-gap metabolic acidosis   -Lactic acidosis   -Mild hyperkalemia   -Creatinine upon arrival 6.11; previously during ED visit on 09/08 creatinine 2.45; baseline appears to be 2.1-2.4.   -CT A/P reviewed with findings noted above.   -Lactate 2.3 upon arrival, but has normalized after fluids.   -Received 500 mL fluid bolus in ED; will hold on additional fluids at this time as 2 units of blood to be transfused.   -Inpatient nephrology consulted, input and assistance much appreciated.   -Obtain creatinine kinase to evaluate for rhabdomyolysis in setting of fall.   -Obtain urine electrolytes and urine protein/creatinine ratio.   -Avoid nephrotoxins as able; planning to hold home Lasix and Neurontin.     -Supratherapeutic INR on chronic anticoagulation with Coumadin (goal INR 2.0-3.0)  -History of blood clots, details unknown  -Paroxysmal atrial fibrillation    -SSS s/p pacemaker placement  -PT and PTT > 100 and INR > 10 upon arrival.   -Warfarin reversal initiated in ED with IV Vitamin K 10 units and IV KCentra 3630 units.   -Daily PT/INR ordered.   -Will hold Coumadin for now.     -Chest pain with typical features, POA  -Coronary artery disease s/p stenting   -Reports intermittent episodes of midsternal chest pain at home.   -Troponin T 0.036; continue to trend.   -EKG without acute ischemic abnormalities present.   -Obtain a.m. TTE.   -May consider cardiology consult.     -Acute urinary tract infection, POA  -Reported dysuria  -UA with 3 + leukocytes, 21-30 WBC, 2+ bacteria, 36 comets epithelium  -Pending urine culture.  -Received IV Rocephin in ED; will continue on admission as patient is symptomatic.    -Chronic diastolic CHF  -Echo from 12/2017 reviewed and noted above.   -Appears euvolemic on exam.   -Obtain TTE as noted above.   -Monitor with strict I/O's and daily weights.     -Generalized anxiety disorder  -Supportive care.   -Review home medications once reconciled.     -Debility  -Palliative care consult.   - consult.   ----------  -DVT prophylaxis: SCD's contraindicated 2/2 frature  -Activity: Up with assistance, fall precautions  -Expected length of stay:   INPATIENT status due to the need for care which can only be reasonably provided in an hospital setting such as aggressive/expedited ancillary services and/or consultation services, the necessity for IV medications, close physician monitoring and/or the possible need for procedures.  In such, I feel patient's risk for adverse outcomes and need for care warrant INPATIENT evaluation and predict the patient's care encounter to likely last beyond 2 midnights.    Medical Intervention Limits: NO intubation (DNI)  Level Of Support Discussed With: Patient; Next of Kin (If No Surrogate)  Code Status (Patient has no pulse and is not breathing): No CPR (Do Not Attempt to Resuscitate)  Medical  Interventions (Patient has pulse or is breathing): Limited Support  Comments: Patient states that she does not want to be resuscitated or put on a ventilator, her son Leno supports her decision    High risk secondary to acute life-threatening anemia likely secondary to large gluteal hematoma, possible acute nondisplaced right lateral tibial plateau and distal tibia fractures, acute on chronic CKD stage IV, supratherapeutic INR     Disposition: Pending clinical course    Michelle Sellers PA-C  09/20/22  01:28 EDT    ---------------------------------------------------------------------------------------------------------------------       Jannet Lagunas MD  [facility]  10/05/22  16:57 EDT      Electronically signed by Jannet Lagunas MD at 10/05/22 1706       Vital Signs (last day)     Date/Time Temp Temp src Pulse Resp BP Patient Position SpO2    10/19/22 1259 -- -- -- -- 144/66 Lying --    10/19/22 0605 -- -- 76 -- 140/64 -- --    10/19/22 0600 98.9 (37.2) Oral 76 16 129/62 Lying --    10/19/22 0033 -- -- 76 -- 116/63 Lying --    10/18/22 2037 -- -- 75 -- 136/72 -- --    10/18/22 1826 98.3 (36.8) Oral 87 16 126/59 Lying 95    10/18/22 1824 -- -- 87 18 -- -- 95    10/18/22 1322 -- -- -- -- 112/56 -- --    10/18/22 1002 -- -- 89 -- 138/70 Standing --    10/18/22 1001 -- -- 80 -- 133/65 Sitting --    10/18/22 1000 -- -- 73 -- 127/61 Lying --    10/18/22 0709 -- -- 86 18 -- -- 95    10/18/22 0600 98 (36.7) Oral 77 16 121/60 Lying --    10/18/22 0500 -- -- -- -- 137/65 Lying --          Intake & Output (last day)       10/18 0701  10/19 0700 10/19 0701  10/20 0700    P.O. 1040     Total Intake(mL/kg) 1040 (14)     Urine (mL/kg/hr) 850 (0.5)     Stool 0     Total Output 850     Net +190           Stool Unmeasured Occurrence 2 x 1 x        Lines, Drains & Airways     Active LDAs     Name Placement date Placement time Site Days    Midline Catheter - Single Lumen 09/25/22 Left 09/25/22   1900  -- 23    Peripheral IV 09/19/22 1650 Left 09/19/22  1650  --  29    Urethral Catheter Silicone 16 Fr. 10/10/22  1442  -- 9                  Current Facility-Administered Medications   Medication Dose Route Frequency Provider Last Rate Last Admin   • docusate sodium (COLACE) capsule 100 mg  100 mg Oral BID Jannet Lagunas MD   100 mg at 10/19/22 0719   • Enoxaparin Sodium (LOVENOX) syringe 80 mg  1 mg/kg Subcutaneous Q24H Ngoc Luo PharmD   80 mg at 10/18/22 2037   • escitalopram (LEXAPRO) tablet 10 mg  10 mg Oral Daily Jannet Lagunas MD   10 mg at 10/19/22 0719   • furosemide (LASIX) tablet 40 mg  40 mg Oral BID Jannet Lagunas MD   40 mg at 10/19/22 0719   • gabapentin (NEURONTIN) capsule 300 mg  300 mg Oral Nightly Jannet Lagunas MD   300 mg at 10/18/22 2037   • hydrALAZINE (APRESOLINE) tablet 10 mg  10 mg Oral Q8H Mustapha Loredo DO   10 mg at 10/19/22 1302   • HYDROcodone-acetaminophen (NORCO) 7.5-325 MG per tablet 1 tablet  1 tablet Oral Q8H PRN Jannet Lagunas MD   1 tablet at 10/10/22 0121   • ipratropium-albuterol (DUO-NEB) nebulizer solution 3 mL  3 mL Nebulization Q6H PRN Jannet Lagunas MD   3 mL at 10/15/22 0814   • melatonin tablet 10 mg  10 mg Oral Nightly Jannet Lagunas MD   10 mg at 10/18/22 2037   • metoprolol tartrate (LOPRESSOR) tablet 12.5 mg  12.5 mg Oral Q12H Jannet Lagunas MD   12.5 mg at 10/19/22 0719   • ondansetron (ZOFRAN) injection 4 mg  4 mg Intravenous Q6H PRN Jannet Lagunas MD       • pantoprazole (PROTONIX) EC tablet 40 mg  40 mg Oral Q AM Jannet Lagunas MD   40 mg at 10/19/22 0605   • phenol (CHLORASEPTIC) 1.4 % liquid 1 spray  1 spray Mouth/Throat Q2H PRN Jannet Lagunas MD   1 spray at 10/13/22 2056   • polyethylene glycol (MIRALAX) packet 17 g  17 g Oral BID PRN Jannet Lagunas MD       • prochlorperazine (COMPAZINE) injection 5 mg  5 mg Intravenous Q6H PRN Jannet Lagunas MD        Or   • prochlorperazine  "(COMPAZINE) tablet 5 mg  5 mg Oral Q6H PRN Jannet Lagunas MD        Or   • prochlorperazine (COMPAZINE) suppository 25 mg  25 mg Rectal Q12H PRN Jannet Lagunas MD       • promethazine (PHENERGAN) 6.25 mg in sodium chloride 0.9 % 50 mL  6.25 mg Intravenous Q6H PRN Jannet Lagunas MD       • sodium chloride 0.9 % flush 10 mL  10 mL Intravenous PRN Jannet Lagunas MD       • sodium chloride 0.9 % flush 10 mL  10 mL Intravenous Q12H Jannet Lagunas MD   10 mL at 10/18/22 2046   • sodium chloride 0.9 % flush 10 mL  10 mL Intravenous PRN Jannet Lagunas MD       • sodium chloride 0.9 % flush 10 mL  10 mL Intravenous Q12H Jannet Lagunas MD   10 mL at 10/18/22 2045   • sodium chloride 0.9 % flush 10 mL  10 mL Intravenous PRN Jannet Lagunas MD       • tamsulosin (FLOMAX) 24 hr capsule 0.4 mg  0.4 mg Oral Daily Jannet Lagunas MD   0.4 mg at 10/19/22 0719   • tuberculin injection 5 Units  5 Units Intradermal Once Jannet Lagunas MD         Operative/Procedure Notes (most recent note)    No notes of this type exist for this encounter.            Physician Progress Notes (most recent note)      Liyah Jaquez APRN at 10/18/22 1055          Palliative Care Daily Progress Note     S: Medical record reviewed, followed up with Primary RN Nhi and Dr Lagunas as well as PT and social work regarding patient's condition. Palliative care visited the pt this am with Erin in . We discussed with her that she needed to show some improvement and to try her best to work with physical therapy. When asked how she felt she stated not good, when I further probed what was wrong, what didn't feel right, she could not tell me. She denied pain, nausea, shortness of breath, anxiety, fever or chills. She just seemed more down and out today.      O:   Palliative Performance Scale Score:     /56   Pulse 89   Temp 98 °F (36.7 °C) (Oral)   Resp 18   Ht 170.2 cm (67.01\")   Wt 76.2 kg (167 lb 14.4 oz)   " SpO2 95%   BMI 26.29 kg/m²     Intake/Output Summary (Last 24 hours) at 10/18/2022 1632  Last data filed at 10/18/2022 0229  Gross per 24 hour   Intake 460 ml   Output 500 ml   Net -40 ml       PE:  General Appearance:    Pt still appears weak and more drowsy this am alert, cooperative, NAD   HEENT:    NC/AT, without obvious abnormality, EOMI, anicteric, nasal congestion present, however imptoved   Neck:   supple, trachea midline, no JVD   Lungs:     Regular even respirations, lungs clear, regular unlabored respirations    Heart:    Regular heart rate and rhythm, normal S1 and S2, no M/R/G   Abdomen:     Soft, NT, ND, NABS    Extremities:   Moves all extremities weakly, no edema   Pulses:   Pulses palpable and equal bilaterally   Skin:   Warm, dry   Neurologic:   A/Ox3, cooperative   Psych:   Calm, flat affect(appeared depressed)         Meds: Reviewed and changes noted.    Labs:   Results from last 7 days   Lab Units 10/17/22  0507   WBC 10*3/mm3 7.35   HEMOGLOBIN g/dL 9.8*   HEMATOCRIT % 30.9*   PLATELETS 10*3/mm3 197     Results from last 7 days   Lab Units 10/17/22  0507   SODIUM mmol/L 136   POTASSIUM mmol/L 3.8   CHLORIDE mmol/L 98   CO2 mmol/L 27.7   BUN mg/dL 34*   CREATININE mg/dL 1.98*   GLUCOSE mg/dL 108*   CALCIUM mg/dL 8.9     Results from last 7 days   Lab Units 10/17/22  0507   SODIUM mmol/L 136   POTASSIUM mmol/L 3.8   CHLORIDE mmol/L 98   CO2 mmol/L 27.7   BUN mg/dL 34*   CREATININE mg/dL 1.98*   CALCIUM mg/dL 8.9   GLUCOSE mg/dL 108*     Imaging Results (Last 72 Hours)     ** No results found for the last 72 hours. **            Diagnostics: Reviewed    A: Tre Kinney is a 89 y.o.  female admitted on 10/5/2022 to [facility] bed for physical therapy for ten days. She was originally admitted to the hospital on 9/19/2022 due to a fall and symptomatic anemia. She has a medical history CHF, chronic kidney disease, coronary artery disease s/p stenting, history of blood clots, chronic anticoagulation with  Coumadin, SSS s/p pacemaker placement, chronic diastolic CHF, and iron deficiency anemia who presents today with a chief complaint of a fall. Per pt she has become progressively more weak and has experienced several falls. Pt has sustained a moderate acute superior endplate vertebral compression fracture at the L1 with about 40% loss of vertebral body height and mild posterior osseous retropulsion of about 6 mm and also noted to have chronic compression fracture deformities involving the inferior endplates of L3 and L4 due to her first fall and from her second fall she does have a large hematoma on her right side, which led her to this admission. Pt had a hemoglobin of 6.1 for which she received 2 units of PRBC's. Pt denies any known source of bleeding and does say she had a scope(EGD colonoscopy at [facility] in 2018.  Today 10/18/2022  Temp 98, HR 89, RR 18, /70 and was saturating 95% on room air.    P:  Palliative care was able to touch base with patients son Leno to see if it was a possibility for he and his brothers to come in for a family meeting with palliative  and Dr Lagunas. Leno said that they could be here tomorrow 10/19 at 12:30.    We will continue to follow along. Please do not hesitate to contact us regarding further sx mgmt or GOC needs, including after hours or on weekends via our on call provider at [phone].     LANCE Cantu    10/18/2022    Electronically signed by Liyah Jaquez APRN at 10/18/22 1641          Consult Notes (most recent note)      Liyah Jaquez APRN at 10/10/22 1145      Consult Orders    1. Inpatient Palliative Care MD Consult [[id number]] ordered by Mustapha Loredo DO at 10/10/22 0604               Palliative Care Initial Consult     Attending Physician: Mustapha Loredo, *  Referring Provider: Mustapha Loredo    Palliative care reason for consult:GOC/ACP support for pt and family.  Code Status:   Code Status and Medical  Interventions:   Ordered at: 10/05/22 1253     Medical Intervention Limits:    NO intubation (DNI)     Level Of Support Discussed With:    Patient    Next of Kin (If No Surrogate)     Code Status (Patient has no pulse and is not breathing):    No CPR (Do Not Attempt to Resuscitate)     Medical Interventions (Patient has pulse or is breathing):    Limited Support     Comments:    Patient states that she does not want to be resuscitated or put on a ventilator, her son Leno supports her decision      Advanced Directives: Advance Directive Status: Patient does not have advance directive   Healthcare surrogate: YUKIK three adult sons, Valdez, Leno and Willian.  Goals of Care: Tre confirmed that she does not want to be resuscitated or to be put on ventilator, and would like to return home with son Valdez when she is stronger, she is currently swing bed/working with PT.    HPI:  Tre Kinney is a 89 y.o. female admitted on 10/5/2022 to swing bed for physical therapy for ten days. She was originally admitted to the hospital on 9/19/2022 due to a fall and symptomatic anemia. She has a medical history CHF, chronic kidney disease, coronary artery disease s/p stenting, history of blood clots, chronic anticoagulation with Coumadin, SSS s/p pacemaker placement, chronic diastolic CHF, and iron deficiency anemia who presents today with a chief complaint of a fall. Per pt she has become progressively more weak and has experienced several falls. Pt has sustained a moderate acute superior endplate vertebral compression fracture at the L1 with about 40% loss of vertebral body height and mild posterior osseous retropulsion of about 6 mm and also noted to have chronic compression fracture deformities involving the inferior endplates of L3 and L4 due to her first fall and from her second fall she does have a large hematoma on her right side, which led her to this admission. Pt had a hemoglobin of 6.1 for which she received 2 units of PRBC's.  Pt denies any known source of bleeding and does say she had a scope(EGD colonoscopy at [facility] in 2018.        ROS: Negative except as above in HPI.     Past Medical History:   Diagnosis Date   • Anemia    • Arthritis    • CHF (congestive heart failure) (HCC)    • Chronic kidney disease    • Coronary artery disease    • H/O blood clots    • History of transfusion     no reaction    • Hypertension      Past Surgical History:   Procedure Laterality Date   • ABDOMINAL SURGERY     • APPENDECTOMY     • CARDIAC SURGERY     •  SECTION     • COLONOSCOPY N/A 3/7/2018    Procedure: COLONOSCOPY;  Surgeon: Willie Gresham MD;  Location:  [facility];  Service:    • CORONARY ANGIOPLASTY WITH STENT PLACEMENT     • ENDOSCOPY N/A 3/6/2018    Procedure: ESOPHAGOGASTRODUODENOSCOPY;  Surgeon: Willie Gresham MD;  Location:  [facility];  Service:    • PACEMAKER IMPLANTATION      left side    • SKIN BIOPSY     • TUBAL ABDOMINAL LIGATION       Social History     Socioeconomic History   • Marital status:    Tobacco Use   • Smoking status: Never   Substance and Sexual Activity   • Alcohol use: No   • Drug use: No   • Sexual activity: Defer     Family History   Problem Relation Age of Onset   • Other Mother         cardiac disorder   • Hypertension Mother    • Heart disease Mother    • Thyroid disease Mother    • Other Father         cardiac disorder   • Heart disease Father    • Hypertension Father    • Other Sister         cardiac disorder   • Other Brother         cardiac disorder   • Other Other         cardiac disorder   • Other Other         cardiac disorder       Allergies   Allergen Reactions   • Adhesive Tape Dermatitis   • Xanax [Alprazolam] Unknown - Low Severity       Current Facility-Administered Medications   Medication Dose Route Frequency Provider Last Rate Last Admin   • docusate sodium (COLACE) capsule 100 mg  100 mg Oral BID Jannet Lagunas MD   100 mg at 10/10/22 0824   • escitalopram (LEXAPRO)  tablet 10 mg  10 mg Oral Daily Jannet Lagunas MD   10 mg at 10/10/22 0824   • furosemide (LASIX) tablet 40 mg  40 mg Oral BID Jannet Lagunas MD   40 mg at 10/10/22 0824   • gabapentin (NEURONTIN) capsule 300 mg  300 mg Oral Nightly Jannet Lagunas MD   300 mg at 10/09/22 2132   • heparin (porcine) 5000 UNIT/ML injection 5,000 Units  5,000 Units Subcutaneous Q12H Jannet Lagunas MD   5,000 Units at 10/10/22 0824   • hydrALAZINE (APRESOLINE) tablet 25 mg  25 mg Oral Q8H Jannet Lagunas MD       • HYDROcodone-acetaminophen (NORCO) 7.5-325 MG per tablet 1 tablet  1 tablet Oral Q8H PRN Jannet Lagunas MD   1 tablet at 10/10/22 0121   • ipratropium-albuterol (DUO-NEB) nebulizer solution 3 mL  3 mL Nebulization Q6H PRN Jannet Lagunas MD   3 mL at 10/10/22 0745   • melatonin tablet 10 mg  10 mg Oral Nightly Jannet Lagunas MD   10 mg at 10/09/22 2132   • metoprolol tartrate (LOPRESSOR) tablet 12.5 mg  12.5 mg Oral Q12H Jannet Lagunas MD   12.5 mg at 10/10/22 0824   • ondansetron (ZOFRAN) injection 4 mg  4 mg Intravenous Q6H PRN Jannet Lagunas MD       • pantoprazole (PROTONIX) EC tablet 40 mg  40 mg Oral Q AM Jannet Lagunas MD   40 mg at 10/10/22 0443   • phenol (CHLORASEPTIC) 1.4 % liquid 1 spray  1 spray Mouth/Throat Q2H PRN Jannet Lagunas MD       • polyethylene glycol (MIRALAX) packet 17 g  17 g Oral BID PRN Jannet Lagunas MD       • prochlorperazine (COMPAZINE) injection 5 mg  5 mg Intravenous Q6H PRN Janent Lagunas MD        Or   • prochlorperazine (COMPAZINE) tablet 5 mg  5 mg Oral Q6H PRN Jannet Lagunas MD        Or   • prochlorperazine (COMPAZINE) suppository 25 mg  25 mg Rectal Q12H PRN Jannet Lagunas MD       • promethazine (PHENERGAN) 6.25 mg in sodium chloride 0.9 % 50 mL  6.25 mg Intravenous Q6H PRN Jannet Lagunas MD       • sodium chloride 0.9 % flush 10 mL  10 mL Intravenous PRN Jannet Lagunas MD       • sodium chloride 0.9 % flush 10 mL  10  "mL Intravenous Q12H Jannet Lagunas MD   10 mL at 10/10/22 0824   • sodium chloride 0.9 % flush 10 mL  10 mL Intravenous PRN Jannet Lagunas MD       • sodium chloride 0.9 % flush 10 mL  10 mL Intravenous Q12H Jannet Lagunas MD   10 mL at 10/10/22 0824   • sodium chloride 0.9 % flush 10 mL  10 mL Intravenous PRN Jannet Lagunas MD       • [START ON 10/19/2022] tuberculin injection 5 Units  5 Units Intradermal Once Jannet Lagunas MD            •  HYDROcodone-acetaminophen  •  ipratropium-albuterol  •  ondansetron  •  phenol  •  polyethylene glycol  •  prochlorperazine **OR** prochlorperazine **OR** prochlorperazine  •  promethazine  •  sodium chloride  •  sodium chloride  •  sodium chloride    Current medication reviewed for route, type, dose and frequency and are current per MAR.    Palliative Performance Scale Score:     /78 (BP Location: Left arm, Patient Position: Lying)   Pulse 77   Temp 98.1 °F (36.7 °C) (Oral)   Resp 16   Ht 170.2 cm (67.01\")   Wt 71.8 kg (158 lb 3.2 oz)   SpO2 95%   BMI 24.77 kg/m²     Intake/Output Summary (Last 24 hours) at 10/10/2022 1405  Last data filed at 10/10/2022 0410  Gross per 24 hour   Intake 360 ml   Output 1200 ml   Net -840 ml       PE:  General Appearance:    Pt appears weak, alert, cooperative, NAD   HEENT:    NC/AT, without obvious abnormality, EOMI, anicteric, nasal congestion present   Neck:   supple, trachea midline, no JVD   Lungs:     Regular even respirations, CTA, on room air, no distress    Heart:    RRR, normal S1 and S2, no M/R/G   Abdomen:     Soft, NT, ND, NABS    Extremities:   Moves all extremities, no edema   Pulses:   Pulses palpable and equal bilaterally   Skin:   Warm, dry   Neurologic:   A/Ox3, cooperative, weakness   Psych:   Calm, appropriate       Labs:   Results from last 7 days   Lab Units 10/10/22  0246   WBC 10*3/mm3 6.70   HEMOGLOBIN g/dL 10.3*   HEMATOCRIT % 31.6*   PLATELETS 10*3/mm3 201     Results from last 7 days "   Lab Units 10/10/22  0246   SODIUM mmol/L 138   POTASSIUM mmol/L 3.3*   CHLORIDE mmol/L 99   CO2 mmol/L 25.5   BUN mg/dL 39*   CREATININE mg/dL 2.40*   GLUCOSE mg/dL 116*   CALCIUM mg/dL 9.0     Results from last 7 days   Lab Units 10/10/22  0246 10/07/22  0047 10/05/22  0311   SODIUM mmol/L 138   < > 137   POTASSIUM mmol/L 3.3*   < > 3.7   CHLORIDE mmol/L 99   < > 98   CO2 mmol/L 25.5   < > 24.1   BUN mg/dL 39*   < > 21   CREATININE mg/dL 2.40*   < > 1.90*   CALCIUM mg/dL 9.0   < > 9.3   BILIRUBIN mg/dL  --   --  0.8   ALK PHOS U/L  --   --  66   ALT (SGPT) U/L  --   --  6   AST (SGOT) U/L  --   --  11   GLUCOSE mg/dL 116*   < > 101*    < > = values in this interval not displayed.     Imaging Results (Last 72 Hours)     ** No results found for the last 72 hours. **          Diagnostics: Reviewed    A: Tre Kinney is a 89 y.o. female admitted on 10/5/2022 to [facility] for physical therapy for ten days. She was originally admitted to the hospital on 9/19/2022 due to a fall and symptomatic anemia. She has a medical history CHF, chronic kidney disease, coronary artery disease s/p stenting, history of blood clots, chronic anticoagulation with Coumadin, SSS s/p pacemaker placement, chronic diastolic CHF, and iron deficiency anemia who presents today with a chief complaint of a fall. Per pt she has become progressively more weak and has experienced several falls. Pt has sustained a moderate acute superior endplate vertebral compression fracture at the L1 with about 40% loss of vertebral body height and mild posterior osseous retropulsion of about 6 mm and also noted to have chronic compression fracture deformities involving the inferior endplates of L3 and L4 due to her first fall and from her second fall she does have a large hematoma on her right side, which led her to this admission. Pt had a hemoglobin of 6.1 for which she received 2 units of PRBC's. Pt denies any known source of bleeding and does say she had a  scope(EGD colonoscopy at [facility] in 2018.    Today 10/10/2022  Pt was awake alert and oriented, she was afebrile, on room air saturating 95%, HR 77, RR 16 and BP of 153/78. Tre denied any pain, nausea, anxiety, or shortness of breath at this time.           P:   Palliative was consulted to discuss GOC/ACP support for pt and family. Palliative was able to speak with patients son Leno today to let him know that we were re-consulted. We let him know that Tre was working with PT when we entered the room at lunch time, and that we helped set up her lunch and she was eating at her potato, and that Tre told us to tell him that she was trying. Leno was under the impression that depending on how well Tre works with Therapy that she may be able to get an extension of swing bed days. I have reached out to Kelly in  to check and see what Tre's options would be, whether it be an extension on swing bed days or would she qualify for rehab placement. I will continue to follow up with patient and family.    We appreciate the consult and the opportunity to participate in Tre Kinney's care. We will continue to follow along. Please do not hesitate to contact us regarding further symptom management or goals of care needs, including after hours or on weekends via our on call provider at [phone].     Time: 30 minutes spent reviewing medical and medication records, assessing and examining patient, discussing with family, answering questions, providing some guidance about a plan and documentation of care, and coordinating care with other healthcare members, with > 50% time spent face to face.     LANCE Cantu    10/10/2022      Electronically signed by Liyah Jaquez APRN at 10/10/22 1430          Nutrition Notes (most recent note)      Magi Oneal RD at 10/13/22 0831        Nutrition Services    Patient Name:  Tre Kinney  YOB: 1932  MRN: [id number]  Admit Date:  10/5/2022    BMI: 25.98  Diet:  Soft Texture, Chopped, Thin   Supplement: Magic Cup BID  Intake: 31% avg over 12 meals  Fluid Intake:  Not meeting needs.     Electronically signed by:  Magi Oneal RD  10/13/22 08:31 EDT     Electronically signed by Magi Oneal RD at 10/13/22 0832          Physical Therapy Notes (most recent note)      Bhargavi Vásquez, PT at 10/19/22 1345  Version 1 of 1         Acute Care - Physical Therapy Treatment Note  [facility]     Patient Name: Tre Kinney  : 1932  MRN: [id number]  Today's Date: 10/19/2022   Onset of Illness/Injury or Date of Surgery: 10/06/22  Visit Dx:   No diagnosis found.  Patient Active Problem List   Diagnosis   • Acid reflux   • Ankle arthralgia   • Cardiac conduction disorder   • Closed fracture of distal fibula   • Arteriosclerosis of coronary artery   • Fracture of distal end of tibia   • Elevated cholesterol   • BP (high blood pressure)   • Hypertrophic polyarthritis   • OP (osteoporosis)   • Right heart failure (HCC)   • Heart failure, chronic, right-sided (HCC)   • SOB (shortness of breath)   • Palpitations   • CHF (congestive heart failure) (HCC)   • Congestive heart failure (HCC)   • Precordial pain   • Chronic anticoagulation   • Hypokalemia   • Anemia, unspecified type   • Generalized weakness     Past Medical History:   Diagnosis Date   • Anemia    • Arthritis    • CHF (congestive heart failure) (HCC)    • Chronic kidney disease    • Coronary artery disease    • H/O blood clots    • History of transfusion     no reaction    • Hypertension      Past Surgical History:   Procedure Laterality Date   • ABDOMINAL SURGERY     • APPENDECTOMY     • CARDIAC SURGERY     •  SECTION     • COLONOSCOPY N/A 3/7/2018    Procedure: COLONOSCOPY;  Surgeon: Willie Gresham MD;  Location: [facility] ENDOSCOPY;  Service:    • CORONARY ANGIOPLASTY WITH STENT PLACEMENT     • ENDOSCOPY N/A 3/6/2018    Procedure: ESOPHAGOGASTRODUODENOSCOPY;  Surgeon: Willie Gresham MD;  Location: [facility]  ENDOSCOPY;  Service:    • PACEMAKER IMPLANTATION      left side    • SKIN BIOPSY     • TUBAL ABDOMINAL LIGATION       PT Assessment (last 12 hours)     PT Evaluation and Treatment     Row Name 10/19/22 1327          Physical Therapy Time and Intention    Subjective Information complains of;weakness;fatigue;pain  -CT     Document Type therapy note (daily note)  -CT     Mode of Treatment physical therapy  -CT     Patient Effort poor  -CT     Row Name 10/19/22 1327          General Information    Patient Profile Reviewed yes  -CT     Existing Precautions/Restrictions fall;TLSO  pt continues to decline TLSO use  -CT     Limitations/Impairments safety/cognitive  -CT     Row Name 10/19/22 1327          Home Use of Assistive/Adaptive Equipment    Equipment Currently Used at Home wheelchair;commode;walker, rolling;oxygen  -CT     Row Name 10/19/22 1327          Cognition    Affect/Mental Status (Cognition) WFL  -CT     Orientation Status (Cognition) oriented x 3  -CT     Follows Commands (Cognition) WFL  -CT     Row Name 10/19/22 1323          Bed Mobility    Bed Mobility sit-supine;supine-sit;scooting/bridging;rolling left;rolling right  -CT     Rolling Left [place] (Bed Mobility) minimum assist (75% patient effort)  -CT     Rolling Right [place] (Bed Mobility) minimum assist (75% patient effort)  -CT     Scooting/Bridging [place] (Bed Mobility) minimum assist (75% patient effort)  -CT     Supine-Sit [place] (Bed Mobility) moderate assist (50% patient effort)  -CT     Sit-Supine [place] (Bed Mobility) moderate assist (50% patient effort);2 person assist;verbal cues;nonverbal cues (demo/gesture)  -CT     Bed Mobility, Safety Issues decreased use of arms for pushing/pulling;decreased use of legs for bridging/pushing;impaired trunk control for bed mobility  -CT     Assistive Device (Bed Mobility) bed rails;draw sheet  -CT     Row Name 10/19/22 1326          Transfers    Transfers bed-chair  transfer;chair-bed transfer  -CT     Row Name 10/19/22 1327          Bed-Chair Transfer    Bed-Chair [place] (Transfers) maximum assist (25% patient effort);dependent (less than 25% patient effort);2 person assist  -CT     Assistive Device (Bed-Chair Transfers) walker, front-wheeled  -CT     Row Name 10/19/22 1327          Chair-Bed Transfer    Chair-Bed [place] (Transfers) maximum assist (25% patient effort);dependent (less than 25% patient effort);2 person assist  -CT     Assistive Device (Chair-Bed Transfers) walker, front-wheeled  -CT     Row Name 10/19/22 1327          Sit-Stand Transfer    Sit-Stand [place] (Transfers) moderate assist (50% patient effort);maximum assist (25% patient effort);2 person assist;verbal cues  -CT     Assistive Device (Sit-Stand Transfers) walker, front-wheeled  -CT     Row Name 10/19/22 1327          Stand-Sit Transfer    Stand-Sit [place] (Transfers) moderate assist (50% patient effort);2 person assist;verbal cues  -CT     Assistive Device (Stand-Sit Transfers) walker, front-wheeled  -CT     Row Name 10/19/22 1327          Gait/Stairs (Locomotion)    [place] Level (Gait) maximum assist (25% patient effort);dependent (less than 25% patient effort);2 person assist;verbal cues;nonverbal cues (demo/gesture)  -CT     Assistive Device (Gait) walker, front-wheeled  -CT     Distance in Feet (Gait) 3 ft during transfer to chair  -CT     Pattern (Gait) step-to  -CT     Deviations/Abnormal Patterns (Gait) gait speed decreased;festinating/shuffling;weight shifting decreased  -CT     Bilateral Gait Deviations weight shift ability decreased;forward flexed posture  -CT     Comment, (Gait/Stairs) pt needs constant cueing to stand upright to prevent sitting early during transfer  -CT     Row Name 10/19/22 1327          Motor Skills    Therapeutic Exercise --  BLE sitting ther ex  -CT     Row Name 10/19/22 1323          Plan of Care Review    Plan of Care Reviewed With  patient  -CT     Progress no change  -CT     Row Name 10/19/22 1327          Positioning and Restraints    Pre-Treatment Position in bed  -CT     Post Treatment Position chair  -CT     In Chair sitting;call light within reach;encouraged to call for assist;notified nsg  -CT     Row Name 10/19/22 1327          Therapy Assessment/Plan (PT)    Rehab Potential (PT) fair, will monitor progress closely  -CT     Criteria for Skilled Interventions Met (PT) yes;meets criteria  -CT     Therapy Frequency (PT) 5 times/wk  5-6 times/wk  -CT           User Key  (r) = Recorded By, (t) = Taken By, (c) = Cosigned By    Initials Name Provider Type    CT Bhargavi Vásquez, PT Physical Therapist                  PT Recommendation and Plan  Anticipated Discharge Disposition (PT): home with / care, home with home health  Planned Therapy Interventions (PT): balance training, bed mobility training, gait training, home exercise program, manual therapy techniques, motor coordination training, neuromuscular re-education, patient/family education, postural re-education, strengthening, transfer training  Therapy Frequency (PT): 5 times/wk (5-6 times/wk)  Plan of Care Reviewed With: patient  Progress: no change   Outcome Measures     Row Name 10/17/22 1400             How much help from another is currently needed...    Putting on and taking off regular lower body clothing? 1  -LA      Bathing (including washing, rinsing, and drying) 2  -LA      Toileting (which includes using toilet bed pan or urinal) 2  -LA      Putting on and taking off regular upper body clothing 2  -LA      Taking care of personal grooming (such as brushing teeth) 3  -LA      Eating meals 3  -LA      AM-PAC 6 Clicks Score (OT) 13  -LA         Functional Assessment    Outcome Measure Options AM-PAC 6 Clicks Daily Activity (OT)  -LA            User Key  (r) = Recorded By, (t) = Taken By, (c) = Cosigned By    Initials Name Provider Type    Dianna Barreto, OT Occupational  Therapist                 Time Calculation:    PT Charges     Row Name 10/19/22 1344             Time Calculation    PT Received On 10/19/22  -CT         Time Calculation- PT    Total Timed Code Minutes- PT 41 minute(s)  -CT            User Key  (r) = Recorded By, (t) = Taken By, (c) = Cosigned By    Initials Name Provider Type    CT Bhargavi Vásquez, PT Physical Therapist              Therapy Charges for Today     Code Description Service Date Service Provider Modifiers Qty    31139679311 HC GAIT TRAINING EA 15 MIN 10/18/2022 Bhargavi Vásquez, PT GP 1    43853898879 HC PT THERAPEUTIC ACT EA 15 MIN 10/18/2022 Bhargavi Vásquez, PT GP 1    03504395313 HC PT THER PROC EA 15 MIN 10/18/2022 Bhargavi Vásquez, PT GP 1    34753180833 HC GAIT TRAINING EA 15 MIN 10/19/2022 Bhargavi Vásquez, PT GP 1    07369548529 HC PT THERAPEUTIC ACT EA 15 MIN 10/19/2022 Bhargavi Vásquez, PT GP 1    64973692970 HC PT THER PROC EA 15 MIN 10/19/2022 Bhargavi Vásquez, PT GP 1          PT G-Codes  Outcome Measure Options: Modified [place]  AM-PAC 6 Clicks Score (PT): 10  AM-PAC 6 Clicks Score (OT): 13    Bhargavi Vásquez PT  10/19/2022      Electronically signed by Bhargavi Vásquez, PT at 10/19/22 1345          Occupational Therapy Notes (most recent note)      Linn Quevedo, OT at 10/19/22 1423          Patient Name: Tre Kinney  : 1932    MRN: [id number]                              Today's Date: 10/19/2022       Admit Date: 10/5/2022    Visit Dx: No diagnosis found.  Patient Active Problem List   Diagnosis   • Acid reflux   • Ankle arthralgia   • Cardiac conduction disorder   • Closed fracture of distal fibula   • Arteriosclerosis of coronary artery   • Fracture of distal end of tibia   • Elevated cholesterol   • BP (high blood pressure)   • Hypertrophic polyarthritis   • OP (osteoporosis)   • Right heart failure (HCC)   • Heart failure, chronic, right-sided (HCC)   • SOB (shortness of breath)   • Palpitations   • CHF (congestive heart  failure) ([facility])   • Congestive heart failure (HCC)   • Precordial pain   • Chronic anticoagulation   • Hypokalemia   • Anemia, unspecified type   • Generalized weakness     Past Medical History:   Diagnosis Date   • Anemia    • Arthritis    • CHF (congestive heart failure) ([facility])    • Chronic kidney disease    • Coronary artery disease    • H/O blood clots    • History of transfusion     no reaction    • Hypertension      Past Surgical History:   Procedure Laterality Date   • ABDOMINAL SURGERY     • APPENDECTOMY     • CARDIAC SURGERY     •  SECTION     • COLONOSCOPY N/A 3/7/2018    Procedure: COLONOSCOPY;  Surgeon: Willie Gresham MD;  Location:  [facility] ENDOSCOPY;  Service:    • CORONARY ANGIOPLASTY WITH STENT PLACEMENT     • ENDOSCOPY N/A 3/6/2018    Procedure: ESOPHAGOGASTRODUODENOSCOPY;  Surgeon: Willie Gresham MD;  Location:  [facility] ENDOSCOPY;  Service:    • PACEMAKER IMPLANTATION      left side    • SKIN BIOPSY     • TUBAL ABDOMINAL LIGATION        General Information     Row Name 10/19/22 1414          OT Time and Intention    Document Type therapy note (daily note)  -     Mode of Treatment individual therapy;occupational therapy  -     Row Name 10/19/22 1414          General Information    Patient Profile Reviewed yes  -     Existing Precautions/Restrictions fall;TLSO  -     Row Name 10/19/22 1414          Cognition    Orientation Status (Cognition) oriented x 3  -     Row Name 10/19/22 1414          Safety Issues, Functional Mobility    Impairments Affecting Function (Mobility) balance;strength;endurance/activity tolerance  -           User Key  (r) = Recorded By, (t) = Taken By, (c) = Cosigned By    Initials Name Provider Type     Linn Quevedo OT Occupational Therapist                 Mobility/ADL's     Row Name 10/19/22 1415          Bed Mobility    Bed Mobility sit-supine;supine-sit;scooting/bridging;rolling left;rolling right  -     Rolling Left [place] (Bed Mobility) minimum  assist (75% patient effort)  -     Rolling Right [place] (Bed Mobility) minimum assist (75% patient effort)  -KP     Supine-Sit [place] (Bed Mobility) moderate assist (50% patient effort)  -KP     Sit-Supine [place] (Bed Mobility) moderate assist (50% patient effort);2 person assist;verbal cues;nonverbal cues (demo/gesture)  -KP     Supine-Sit-Supine [place] (Bed Mobility) moderate assist (50% patient effort)  -     Bed Mobility, Safety Issues decreased use of arms for pushing/pulling;decreased use of legs for bridging/pushing;impaired trunk control for bed mobility  -     Assistive Device (Bed Mobility) bed rails;draw sheet  -     Comment, (Bed Mobility) pt declines use of TLSO  -     Row Name 10/19/22 1415          Transfers    Transfers bed-chair transfer  -     Row Name 10/19/22 1415          Bed-Chair Transfer    Bed-Chair [place] (Transfers) maximum assist (25% patient effort);dependent (less than 25% patient effort);2 person assist  -     Assistive Device (Bed-Chair Transfers) walker, front-wheeled  -           User Key  (r) = Recorded By, (t) = Taken By, (c) = Cosigned By    Initials Name Provider Type    Linn Richardson OT Occupational Therapist               Obj/Interventions     Row Name 10/19/22 1417          Motor Skills    Motor Skills functional endurance  -     Functional Endurance poor plus/fair minus  -     Therapeutic Exercise --  BUE AROM exercises through functional movement patterns X20 reps X3 sets from sitting up in bed; pull to long sit X10 reps addressing core strengthening  -           User Key  (r) = Recorded By, (t) = Taken By, (c) = Cosigned By    Initials Name Provider Type    Linn Richardson OT Occupational Therapist               Goals/Plan    No documentation.                Clinical Impression     Row Name 10/19/22 1419          Pain Assessment    Pretreatment Pain Rating 0/10 - no pain  -     Posttreatment Pain Rating 0/10 -  no pain  -KP     Row Name 10/19/22 1419          Plan of Care Review    Plan of Care Reviewed With patient  -     Progress no change  -     Outcome Evaluation Patient seen for OT evaluation. She was able to transition to edge of bed with minimal assist, but continues to require maximal assist X2 persons with rolling walker for transfer to chair. Patient able to progress tolerance for seated UE exercises. Continue POC, patient will need significant assist upon discharge.  -     Row Name 10/19/22 1419          Therapy Plan Review/Discharge Plan (OT)    Anticipated Discharge Disposition (OT) skilled nursing facility;home with 24/7 care;home with home health  -     Row Name 10/19/22 1419          Positioning and Restraints    Pre-Treatment Position in bed  -KP     Post Treatment Position chair  -KP     In Chair call light within reach  -KP           User Key  (r) = Recorded By, (t) = Taken By, (c) = Cosigned By    Initials Name Provider Type    Linn Richardson, OT Occupational Therapist               Outcome Measures     Row Name 10/19/22 1422          How much help from another is currently needed...    Putting on and taking off regular lower body clothing? 1  -KP     Bathing (including washing, rinsing, and drying) 2  -KP     Toileting (which includes using toilet bed pan or urinal) 2  -KP     Putting on and taking off regular upper body clothing 2  -KP     Taking care of personal grooming (such as brushing teeth) 3  -KP     Eating meals 3  -KP     AM-PAC 6 Clicks Score (OT) 13  -KP     Row Name 10/19/22 5224          How much help from another person do you currently need...    Turning from your back to your side while in flat bed without using bedrails? 3  -VM     Moving from lying on back to sitting on the side of a flat bed without bedrails? 2  -VM     Moving to and from a bed to a chair (including a wheelchair)? 2  -VM     Standing up from a chair using your arms (e.g., wheelchair, bedside chair)? 1   -VM     Climbing 3-5 steps with a railing? 1  -VM     To walk in hospital room? 1  -VM     AM-PAC 6 Clicks Score (PT) 10  -VM     Highest level of mobility 4 --> Transferred to chair/commode  -VM           User Key  (r) = Recorded By, (t) = Taken By, (c) = Cosigned By    Initials Name Provider Type    Destinee Blanton, RN Registered Nurse    Linn Richardson OT Occupational Therapist                  OT Recommendation and Plan     Plan of Care Review  Plan of Care Reviewed With: patient  Progress: no change  Outcome Evaluation: Patient seen for OT evaluation. She was able to transition to edge of bed with minimal assist, but continues to require maximal assist X2 persons with rolling walker for transfer to chair. Patient able to progress tolerance for seated UE exercises. Continue POC, patient will need significant assist upon discharge.     Time Calculation:    Time Calculation- OT     Row Name 10/19/22 1422             Time Calculation- OT    OT Received On 10/19/22  -         Timed Charges    25601 - OT Therapeutic Exercise Minutes 24  -KP      76780 - OT Therapeutic Activity Minutes 15  -KP         Total Minutes    Timed Charges Total Minutes 39  -KP       Total Minutes 39  -KP            User Key  (r) = Recorded By, (t) = Taken By, (c) = Cosigned By    Initials Name Provider Type    Linn Richardson OT Occupational Therapist              Therapy Charges for Today     Code Description Service Date Service Provider Modifiers Qty    43160674770 HC OT THER PROC EA 15 MIN 10/18/2022 Linn Quevedo OT GO 1    15113713292 HC OT THERAPEUTIC ACT EA 15 MIN 10/18/2022 Linn Quevedo OT GO 1    23511010619 HC OT SELF CARE/MGMT/TRAIN EA 15 MIN 10/18/2022 Linn Quevedo OT GO 1    71880619565 HC OT THER PROC EA 15 MIN 10/19/2022 Linn Quevedo OT GO 2    44909021397 HC OT THERAPEUTIC ACT EA 15 MIN 10/19/2022 Linn Quevedo OT GO 1               Linn Quevedo OT  10/19/2022    Electronically signed by  Linn Quevedo, OT at 10/19/22 9763       Speech Language Pathology Notes (most recent note)    No notes exist for this encounter.         Respiratory Therapy Notes (most recent note)    No notes exist for this encounter.         ADL Documentation (most recent)    Flowsheet Row Most Recent Value   Transferring 3 - assistive equipment and person   Toileting 3 - assistive equipment and person   Bathing 2 - assistive person   Dressing 2 - assistive person   Eating 2 - assistive person   Communication 0 - understands/communicates without difficulty   Swallowing 0 - swallows foods/liquids without difficulty   Equipment Currently Used at Home wheelchair, commode, walker, rolling, oxygen

## 2022-10-19 NOTE — PROGRESS NOTES
[facility] HOSPITALIST PROGRESS NOTE     Patient Identification:  Name:  Tre Kinney  Age:  89 y.o.  Sex:  female  :  1932  MRN:  [id number]  Visit Number:  [id number]  ROOM: [address] [place] [address]     Primary Care Provider:  Jessica Pedro APRN     Date of Admission: 10/5/2022    Length of stay in inpatient status:  14    Subjective     Chief Compliant: Generalized weakness post fall    History of Presenting Illness: I saw the patient today while we were performing a family meeting.  Present was nurse practitioner Liyah, palliative care nurse Lorena, son Leno, son Leno, and I.  The patient states that she is tired and not wanting to do her exercises today; however, when I asked her to move her feet back-and-forth, she did do as I asked.  The patient denies pain anywhere today.    Objective     Current Hospital Meds:  docusate sodium, 100 mg, Oral, BID  enoxaparin, 1 mg/kg, Subcutaneous, Q24H  escitalopram, 10 mg, Oral, Daily  furosemide, 40 mg, Oral, BID  gabapentin, 300 mg, Oral, Nightly  hydrALAZINE, 10 mg, Oral, Q8H  melatonin, 10 mg, Oral, Nightly  metoprolol tartrate, 12.5 mg, Oral, Q12H  pantoprazole, 40 mg, Oral, Q AM  sodium chloride, 10 mL, Intravenous, Q12H  sodium chloride, 10 mL, Intravenous, Q12H  tamsulosin, 0.4 mg, Oral, Daily       Current Antimicrobial Therapy:  Anti-Infectives (From admission, onward)    None        Current Diuretic Therapy:  Diuretics (From admission, onward)    Ordered     Dose/Rate Route Frequency Start Stop    10/05/22 1252  furosemide (LASIX) tablet 40 mg        Ordering Provider: Jannet Lagunas MD    40 mg Oral 2 Times Daily (Diuretics) 10/05/22 1800          ----------------------------------------------------------------------------------------------------------------------  Vital Signs:  Temp:  [98.3 °F (36.8 °C)-98.9 °F (37.2 °C)] 98.9 °F (37.2 °C)  Heart Rate:  [75-87] 76  Resp:  [16-18] 16  BP: (116-144)/(59-72) 144/66  SpO2:  [95 %] 95 %  on  Flow  (L/min):  [2] 2;   Device (Oxygen Therapy): room air  Body mass index is 25.6 kg/m².    Wt Readings from Last 3 Encounters:   10/19/22 74.2 kg (163 lb 8 oz)   10/05/22 76.6 kg (168 lb 14.4 oz)   09/08/22 76.7 kg (169 lb)     Intake & Output (last 3 days)       10/16 0701  10/17 0700 10/17 0701  10/18 0700 10/18 0701  10/19 0700 10/19 0701  10/20 0700    P.O. 7979 909 3400     Total Intake(mL/kg) 1040 (13.7) 820 (10.8) 1040 (14)     Urine (mL/kg/hr) 625 (0.3) 500 (0.3) 850 (0.5)     Stool  0 0     Total Output 625 500 850     Net +415 +320 +190             Stool Unmeasured Occurrence  1 x 2 x 1 x        Diet Soft Texture; Chopped; Thin  ----------------------------------------------------------------------------------------------------------------------  Physical Exam  Vitals and nursing note reviewed. Exam conducted with a chaperone present.   Constitutional:       General: She is awake.      Appearance: Normal appearance. She is well-developed.   Pulmonary:      Effort: No respiratory distress.   Musculoskeletal:         General: No deformity.      Right lower leg: No edema.      Left lower leg: No edema.      Comments: The patient was able to lift her bilateral legs on demand.     Skin:     Coloration: Skin is not jaundiced or pale.   Neurological:      Mental Status: She is alert and oriented to person, place, and time. Mental status is at baseline.      Cranial Nerves: No cranial nerve deficit.   Psychiatric:         Mood and Affect: Mood normal.         Behavior: Behavior normal. Behavior is cooperative.       ----------------------------------------------------------------------------------------------------------------------  Tele:  None  ----------------------------------------------------------------------------------------------------------------------  LABS:    CBC and coagulation:  Results from last 7 days   Lab Units 10/17/22  0507 10/16/22  0135 10/15/22  0401 10/14/22  0439   WBC 10*3/mm3 7.35 6.37  6.91 6.79   HEMOGLOBIN g/dL 9.8* 10.0* 10.0* 10.3*   HEMATOCRIT % 30.9* 31.7* 31.4* 32.8*   MCV fL 96.6 96.9 96.3 95.9   MCHC g/dL 31.7 31.5 31.8 31.4*   PLATELETS 10*3/mm3 197 186 182 178     Renal and electrolytes:  Results from last 7 days   Lab Units 10/17/22  0507 10/16/22  0135 10/15/22  0401 10/14/22  0439 10/13/22  0128   SODIUM mmol/L 136 136 136 138 135*   POTASSIUM mmol/L 3.8 4.1 4.2 3.9 4.0   CHLORIDE mmol/L 98 97* 97* 100 97*   CO2 mmol/L 27.7 26.3 26.5 26.5 26.2   BUN mg/dL 34* 37* 37* 36* 35*   CREATININE mg/dL 1.98* 2.22* 2.26* 2.05* 2.10*   CALCIUM mg/dL 8.9 9.1 8.9 9.5 8.8   GLUCOSE mg/dL 108* 113* 111* 102* 103*     Estimated Creatinine Clearance: 20.3 mL/min (A) (by C-G formula based on SCr of 1.98 mg/dL (H)).      I have personally looked at the labs and they are summarized above.    Assessment & Plan      -Acute blood loss anemia that was present on admission and due to a right hip traumatic hematoma in the setting of the supratherapeutic INR, status post 3 units packed red blood cells transfused this admission  -Supratherapeutic INR  that was present on admission, in a patient on chronic anticoagulation with Coumadin  for paroxysmal atrial fibrillation (goal INR 2.0-3.0), nail anticoagulation has been discontinued  -Acute superior endplate compression fracture of L1 with retropulsion (9/8/2022)  -Leukocytosis on admission, suspect due to mechanical fall that occurred prior to admission  -Acute kidney injury on top of chronic kidney disease stage IV (baseline creatinine 2.1-2.4 and admission creatinine 6.1)  -Anion gap metabolic acidosis present on admission, suspect due to a combination of lactic acidosis from the acute blood loss anemia and renal acidosis  -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  The patient does need physical therapy but she also needs a lot of encouragement in order to perform the physical therapy.  The patient does seem to do better when this encouragement is coming from her family; however, it is unrealistic to have the family at our hospital multiple hours a day as they have full-time jobs and families that they also have to take care up.  Therefore, trying to find a rehab solution that meets the patient's needs is proving to be a challenge.  Currently, the patient is medically stable.  I have been checking her labs every few days to make sure that they are still stable.  Please note that I did review her labs from 10/17/2022 again.  At this time, the patient's vital signs are at goal and stable.  I will order orthostatic vital signs to make sure that the patient is not experiencing weakness due to a drop in blood pressure.  We have already checked the patient's thyroid this admission and it was within normal limits.  Also, we have consulted psychiatry for her depression and we actually changed her Lexapro to Prozac as it causes less urinary retention but also can be used for the depression.  I will touch base with psychiatry to see if this can be increased as this patient is of advanced age and large changes in her medications could be detrimental.      VTE Prophylaxis:   Mechanical Order History:      Ordered        10/05/22 1252  Place Sequential Compression Device  Once            10/05/22 1252  Maintain Sequential Compression Device  Continuous                    Pharmalogical Order History:      Ordered     Dose Route Frequency Stop    10/17/22 1147  Enoxaparin Sodium (LOVENOX) syringe 80 mg         1 mg/kg SC Every  24 Hours --    10/12/22 1847  Enoxaparin Sodium (LOVENOX) syringe 70 mg  Status:  Discontinued         1 mg/kg SC Every 24 Hours 10/17/22 1147    10/12/22 1831  Pharmacy to Dose enoxaparin (LOVENOX)  Status:  Discontinued         -- XX Continuous PRN 10/13/22 1417    10/05/22 1252  heparin (porcine) 5000 UNIT/ML injection 5,000 Units  Status:  Discontinued         5,000 Units SC Every 12 Hours Scheduled 10/12/22 1846            Disposition: Home versus short-term rehab    Jannet Lagunas MD  [place] Hospitalist  10/19/22  16:23 EDT

## 2022-10-19 NOTE — PROGRESS NOTES
"Palliative Care Daily Progress Note     S: Medical record reviewed, followed up with Primary RN Destinee and Dr Lagunas regarding patient's condition. When Palliative and Dr Lagunas entered the room, Tre was awake and sitting in bedside recliner with her lunch tray in front of her. Patients son Valdez and Leno was at bedside for a family meeting to discuss plan for Tre, ie, rehabilitation facility placement versus home or long term placement.      O:   Palliative Performance Scale Score:     /66 (BP Location: Right arm, Patient Position: Lying)   Pulse 76   Temp 98.9 °F (37.2 °C) (Oral)   Resp 16   Ht 170.2 cm (67.01\")   Wt 74.2 kg (163 lb 8 oz)   SpO2 95%   BMI 25.60 kg/m²     Intake/Output Summary (Last 24 hours) at 10/19/2022 1650  Last data filed at 10/19/2022 1259  Gross per 24 hour   Intake 1040 ml   Output 850 ml   Net 190 ml       PE:  Pt was awake and alert sitting in bedside recliner, did not appear to be in distress at this time.        Meds: Reviewed and changes noted.    Labs:   Results from last 7 days   Lab Units 10/17/22  0507   WBC 10*3/mm3 7.35   HEMOGLOBIN g/dL 9.8*   HEMATOCRIT % 30.9*   PLATELETS 10*3/mm3 197     Results from last 7 days   Lab Units 10/17/22  0507   SODIUM mmol/L 136   POTASSIUM mmol/L 3.8   CHLORIDE mmol/L 98   CO2 mmol/L 27.7   BUN mg/dL 34*   CREATININE mg/dL 1.98*   GLUCOSE mg/dL 108*   CALCIUM mg/dL 8.9     Results from last 7 days   Lab Units 10/17/22  0507   SODIUM mmol/L 136   POTASSIUM mmol/L 3.8   CHLORIDE mmol/L 98   CO2 mmol/L 27.7   BUN mg/dL 34*   CREATININE mg/dL 1.98*   CALCIUM mg/dL 8.9   GLUCOSE mg/dL 108*     Imaging Results (Last 72 Hours)     ** No results found for the last 72 hours. **            Diagnostics: Reviewed    A: Tre Kinney is a 89 y.o. female  admitted on 10/5/2022 to swing bed for physical therapy for ten days. She was originally admitted to the hospital on 9/19/2022 due to a fall and symptomatic anemia. She has a medical " history CHF, chronic kidney disease, coronary artery disease s/p stenting, history of blood clots, chronic anticoagulation with Coumadin, SSS s/p pacemaker placement, chronic diastolic CHF, and iron deficiency anemia who presents today with a chief complaint of a fall. Per pt she has become progressively more weak and has experienced several falls. Pt has sustained a moderate acute superior endplate vertebral compression fracture at the L1 with about 40% loss of vertebral body height and mild posterior osseous retropulsion of about 6 mm and also noted to have chronic compression fracture deformities involving the inferior endplates of L3 and L4 due to her first fall and from her second fall she does have a large hematoma on her right side, which led her to this admission. Pt had a hemoglobin of 6.1 for which she received 2 units of PRBC's. Pt denies any known source of bleeding and does say she had a scope(EGD colonoscopy at [facility] in 2018.    Today 10/19/2022  T 98.9, HR 76, RR 16 and BP of 144/66 and was saturating 95% on room air, she was not in distress.      P:  Palliative and Dr Lagunas wanted to discuss plan for Tre moving forward. Son Leno and Valdez would like for pt to receive more intense physical therapy. We discussed that  could look at putting in referral to rehab facilities, however I let family and Tre know that there was no guarantee that she would be accepted, for several reason, insurance issue, bed availability, or if facilities do not think Tre could work with them for at least three hours daily. That being said Dr Lagunas sent message to Erin in  to start the process. The hope is that Tre is able to gain strength so that she could return home with son Valdez.    We will continue to follow along. Please do not hesitate to contact us regarding further sx mgmt or GOC needs, including after hours or on weekends via our on call provider at [phone].     Liyah PARHAM  Gisele, APRN    10/19/2022

## 2022-10-19 NOTE — THERAPY TREATMENT NOTE
Acute Care - Physical Therapy Treatment Note   [facility]     Patient Name: Tre Kinney  : 1932  MRN: [id number]  Today's Date: 10/19/2022   Onset of Illness/Injury or Date of Surgery: 10/06/22  Visit Dx:   No diagnosis found.  Patient Active Problem List   Diagnosis   • Acid reflux   • Ankle arthralgia   • Cardiac conduction disorder   • Closed fracture of distal fibula   • Arteriosclerosis of coronary artery   • Fracture of distal end of tibia   • Elevated cholesterol   • BP (high blood pressure)   • Hypertrophic polyarthritis   • OP (osteoporosis)   • Right heart failure (HCC)   • Heart failure, chronic, right-sided (HCC)   • SOB (shortness of breath)   • Palpitations   • CHF (congestive heart failure) (HCC)   • Congestive heart failure (HCC)   • Precordial pain   • Chronic anticoagulation   • Hypokalemia   • Anemia, unspecified type   • Generalized weakness     Past Medical History:   Diagnosis Date   • Anemia    • Arthritis    • CHF (congestive heart failure) (HCC)    • Chronic kidney disease    • Coronary artery disease    • H/O blood clots    • History of transfusion     no reaction    • Hypertension      Past Surgical History:   Procedure Laterality Date   • ABDOMINAL SURGERY     • APPENDECTOMY     • CARDIAC SURGERY     •  SECTION     • COLONOSCOPY N/A 3/7/2018    Procedure: COLONOSCOPY;  Surgeon: Willie Gresham MD;  Location:  [facility];  Service:    • CORONARY ANGIOPLASTY WITH STENT PLACEMENT     • ENDOSCOPY N/A 3/6/2018    Procedure: ESOPHAGOGASTRODUODENOSCOPY;  Surgeon: Willie Gresham MD;  Location:  [facility];  Service:    • PACEMAKER IMPLANTATION      left side    • SKIN BIOPSY     • TUBAL ABDOMINAL LIGATION       PT Assessment (last 12 hours)     PT Evaluation and Treatment     Row Name 10/19/22 1327          Physical Therapy Time and Intention    Subjective Information complains of;weakness;fatigue;pain  -CT     Document Type therapy note (daily note)  -CT     Mode of  Treatment physical therapy  -CT     Patient Effort poor  -CT     Row Name 10/19/22 1327          General Information    Patient Profile Reviewed yes  -CT     Existing Precautions/Restrictions fall;TLSO  pt continues to decline TLSO use  -CT     Limitations/Impairments safety/cognitive  -CT     Row Name 10/19/22 1327          Home Use of Assistive/Adaptive Equipment    Equipment Currently Used at Home wheelchair;commode;walker, rolling;oxygen  -CT     Row Name 10/19/22 1327          Cognition    Affect/Mental Status (Cognition) WFL  -CT     Orientation Status (Cognition) oriented x 3  -CT     Follows Commands (Cognition) WFL  -CT     Row Name 10/19/22 1327          Bed Mobility    Bed Mobility sit-supine;supine-sit;scooting/bridging;rolling left;rolling right  -CT     Rolling Left [place] (Bed Mobility) minimum assist (75% patient effort)  -CT     Rolling Right [place] (Bed Mobility) minimum assist (75% patient effort)  -CT     Scooting/Bridging [place] (Bed Mobility) minimum assist (75% patient effort)  -CT     Supine-Sit [place] (Bed Mobility) moderate assist (50% patient effort)  -CT     Sit-Supine [place] (Bed Mobility) moderate assist (50% patient effort);2 person assist;verbal cues;nonverbal cues (demo/gesture)  -CT     Bed Mobility, Safety Issues decreased use of arms for pushing/pulling;decreased use of legs for bridging/pushing;impaired trunk control for bed mobility  -CT     Assistive Device (Bed Mobility) bed rails;draw sheet  -CT     Row Name 10/19/22 132          Transfers    Transfers bed-chair transfer;chair-bed transfer  -CT     Row Name 10/19/22 1329          Bed-Chair Transfer    Bed-Chair [place] (Transfers) maximum assist (25% patient effort);dependent (less than 25% patient effort);2 person assist  -CT     Assistive Device (Bed-Chair Transfers) walker, front-wheeled  -CT     Row Name 10/19/22 1323          Chair-Bed Transfer    Chair-Bed [place] (Transfers)  maximum assist (25% patient effort);dependent (less than 25% patient effort);2 person assist  -CT     Assistive Device (Chair-Bed Transfers) walker, front-wheeled  -CT     Row Name 10/19/22 1327          Sit-Stand Transfer    Sit-Stand [place] (Transfers) moderate assist (50% patient effort);maximum assist (25% patient effort);2 person assist;verbal cues  -CT     Assistive Device (Sit-Stand Transfers) walker, front-wheeled  -CT     Row Name 10/19/22 1327          Stand-Sit Transfer    Stand-Sit [place] (Transfers) moderate assist (50% patient effort);2 person assist;verbal cues  -CT     Assistive Device (Stand-Sit Transfers) walker, front-wheeled  -CT     Row Name 10/19/22 1327          Gait/Stairs (Locomotion)    [place] Level (Gait) maximum assist (25% patient effort);dependent (less than 25% patient effort);2 person assist;verbal cues;nonverbal cues (demo/gesture)  -CT     Assistive Device (Gait) walker, front-wheeled  -CT     Distance in Feet (Gait) 3 ft during transfer to chair  -CT     Pattern (Gait) step-to  -CT     Deviations/Abnormal Patterns (Gait) gait speed decreased;festinating/shuffling;weight shifting decreased  -CT     Bilateral Gait Deviations weight shift ability decreased;forward flexed posture  -CT     Comment, (Gait/Stairs) pt needs constant cueing to stand upright to prevent sitting early during transfer  -CT     Row Name 10/19/22 1327          Motor Skills    Therapeutic Exercise --  BLE sitting ther ex  -CT     Row Name 10/19/22 1327          Plan of Care Review    Plan of Care Reviewed With patient  -CT     Progress no change  -CT     Row Name 10/19/22 1327          Positioning and Restraints    Pre-Treatment Position in bed  -CT     Post Treatment Position chair  -CT     In Chair sitting;call light within reach;encouraged to call for assist;notified nsg  -CT     Row Name 10/19/22 1327          Therapy Assessment/Plan (PT)    Rehab Potential (PT) fair, will monitor progress  closely  -CT     Criteria for Skilled Interventions Met (PT) yes;meets criteria  -CT     Therapy Frequency (PT) 5 times/wk  5-6 times/wk  -CT           User Key  (r) = Recorded By, (t) = Taken By, (c) = Cosigned By    Initials Name Provider Type    CT Bhargavi Vásquez, PT Physical Therapist                  PT Recommendation and Plan  Anticipated Discharge Disposition (PT): home with /7 care, home with home health  Planned Therapy Interventions (PT): balance training, bed mobility training, gait training, home exercise program, manual therapy techniques, motor coordination training, neuromuscular re-education, patient/family education, postural re-education, strengthening, transfer training  Therapy Frequency (PT): 5 times/wk (5-6 times/wk)  Plan of Care Reviewed With: patient  Progress: no change   Outcome Measures     Row Name 10/17/22 1400             How much help from another is currently needed...    Putting on and taking off regular lower body clothing? 1  -LA      Bathing (including washing, rinsing, and drying) 2  -LA      Toileting (which includes using toilet bed pan or urinal) 2  -LA      Putting on and taking off regular upper body clothing 2  -LA      Taking care of personal grooming (such as brushing teeth) 3  -LA      Eating meals 3  -LA      AM-PAC 6 Clicks Score (OT) 13  -LA         Functional Assessment    Outcome Measure Options AM-PAC 6 Clicks Daily Activity (OT)  -LA            User Key  (r) = Recorded By, (t) = Taken By, (c) = Cosigned By    Initials Name Provider Type    Dianna Barreto, OT Occupational Therapist                 Time Calculation:    PT Charges     Row Name 10/19/22 1824             Time Calculation    PT Received On 10/19/22  -CT         Time Calculation- PT    Total Timed Code Minutes- PT 41 minute(s)  -CT            User Key  (r) = Recorded By, (t) = Taken By, (c) = Cosigned By    Initials Name Provider Type    Bhargavi Woody, EDEL Physical Therapist               Therapy Charges for Today     Code Description Service Date Service Provider Modifiers Qty    83965365241 HC GAIT TRAINING EA 15 MIN 10/18/2022 Bhargavi Vásquez, PT GP 1    65806406502 HC PT THERAPEUTIC ACT EA 15 MIN 10/18/2022 Bhargavi Vásquez, PT GP 1    62786053231 HC PT THER PROC EA 15 MIN 10/18/2022 Bhargavi Vásquez, PT GP 1    40613431400 HC GAIT TRAINING EA 15 MIN 10/19/2022 Bhargavi Vásquez, PT GP 1    08173128552 HC PT THERAPEUTIC ACT EA 15 MIN 10/19/2022 Bhargavi Vásquez, PT GP 1    17853172302 HC PT THER PROC EA 15 MIN 10/19/2022 Bhargavi Vásquez, PT GP 1          PT G-Codes  Outcome Measure Options: Modified [place]  AM-PAC 6 Clicks Score (PT): 10  AM-PAC 6 Clicks Score (OT): 13    Bhargavi Vásquez, PT  10/19/2022

## 2022-10-19 NOTE — CASE MANAGEMENT/SOCIAL WORK
Patient has been approved for additional swing bed days through 10/21 provider and SS notified via secure chat

## 2022-10-19 NOTE — THERAPY TREATMENT NOTE
Patient Name: Tre Kinney  : 1932    MRN: [id number]                              Today's Date: 10/19/2022       Admit Date: 10/5/2022    Visit Dx: No diagnosis found.  Patient Active Problem List   Diagnosis   • Acid reflux   • Ankle arthralgia   • Cardiac conduction disorder   • Closed fracture of distal fibula   • Arteriosclerosis of coronary artery   • Fracture of distal end of tibia   • Elevated cholesterol   • BP (high blood pressure)   • Hypertrophic polyarthritis   • OP (osteoporosis)   • Right heart failure (HCC)   • Heart failure, chronic, right-sided (HCC)   • SOB (shortness of breath)   • Palpitations   • CHF (congestive heart failure) (HCC)   • Congestive heart failure (HCC)   • Precordial pain   • Chronic anticoagulation   • Hypokalemia   • Anemia, unspecified type   • Generalized weakness     Past Medical History:   Diagnosis Date   • Anemia    • Arthritis    • CHF (congestive heart failure) (HCC)    • Chronic kidney disease    • Coronary artery disease    • H/O blood clots    • History of transfusion     no reaction    • Hypertension      Past Surgical History:   Procedure Laterality Date   • ABDOMINAL SURGERY     • APPENDECTOMY     • CARDIAC SURGERY     •  SECTION     • COLONOSCOPY N/A 3/7/2018    Procedure: COLONOSCOPY;  Surgeon: Willie Gresham MD;  Location:  [facility];  Service:    • CORONARY ANGIOPLASTY WITH STENT PLACEMENT     • ENDOSCOPY N/A 3/6/2018    Procedure: ESOPHAGOGASTRODUODENOSCOPY;  Surgeon: Willie Gresham MD;  Location:  [facility];  Service:    • PACEMAKER IMPLANTATION      left side    • SKIN BIOPSY     • TUBAL ABDOMINAL LIGATION        General Information     Row Name 10/19/22 1414          OT Time and Intention    Document Type therapy note (daily note)  -     Mode of Treatment individual therapy;occupational therapy  -     Row Name 10/19/22 1414          General Information    Patient Profile Reviewed yes  -KP     Existing  Precautions/Restrictions fall;TLSO  -     Row Name 10/19/22 1414          Cognition    Orientation Status (Cognition) oriented x 3  -KP     Row Name 10/19/22 1414          Safety Issues, Functional Mobility    Impairments Affecting Function (Mobility) balance;strength;endurance/activity tolerance  -           User Key  (r) = Recorded By, (t) = Taken By, (c) = Cosigned By    Initials Name Provider Type    Linn Richardson OT Occupational Therapist                 Mobility/ADL's     Row Name 10/19/22 1415          Bed Mobility    Bed Mobility sit-supine;supine-sit;scooting/bridging;rolling left;rolling right  -     Rolling Left [place] (Bed Mobility) minimum assist (75% patient effort)  -     Rolling Right [place] (Bed Mobility) minimum assist (75% patient effort)  -     Supine-Sit [place] (Bed Mobility) moderate assist (50% patient effort)  -     Sit-Supine [place] (Bed Mobility) moderate assist (50% patient effort);2 person assist;verbal cues;nonverbal cues (demo/gesture)  -     Supine-Sit-Supine [place] (Bed Mobility) moderate assist (50% patient effort)  -     Bed Mobility, Safety Issues decreased use of arms for pushing/pulling;decreased use of legs for bridging/pushing;impaired trunk control for bed mobility  -     Assistive Device (Bed Mobility) bed rails;draw sheet  -     Comment, (Bed Mobility) pt declines use of TLSO  -     Row Name 10/19/22 1415          Transfers    Transfers bed-chair transfer  -     Row Name 10/19/22 1415          Bed-Chair Transfer    Bed-Chair [place] (Transfers) maximum assist (25% patient effort);dependent (less than 25% patient effort);2 person assist  -     Assistive Device (Bed-Chair Transfers) walker, front-wheeled  -           User Key  (r) = Recorded By, (t) = Taken By, (c) = Cosigned By    Initials Name Provider Type    Linn Richardson OT Occupational Therapist               Obj/Interventions     Row Name 10/19/22  1417          Motor Skills    Motor Skills functional endurance  -     Functional Endurance poor plus/fair minus  -     Therapeutic Exercise --  BUE AROM exercises through functional movement patterns X20 reps X3 sets from sitting up in bed; pull to long sit X10 reps addressing core strengthening  -           User Key  (r) = Recorded By, (t) = Taken By, (c) = Cosigned By    Initials Name Provider Type    Linn Richardson, OT Occupational Therapist               Goals/Plan    No documentation.                Clinical Impression     Row Name 10/19/22 1419          Pain Assessment    Pretreatment Pain Rating 0/10 - no pain  -KP     Posttreatment Pain Rating 0/10 - no pain  -KP     Row Name 10/19/22 1419          Plan of Care Review    Plan of Care Reviewed With patient  -     Progress no change  -     Outcome Evaluation Patient seen for OT evaluation. She was able to transition to edge of bed with minimal assist, but continues to require maximal assist X2 persons with rolling walker for transfer to chair. Patient able to progress tolerance for seated UE exercises. Continue POC, patient will need significant assist upon discharge.  -     Row Name 10/19/22 1419          Therapy Plan Review/Discharge Plan (OT)    Anticipated Discharge Disposition (OT) skilled nursing facility;home with 24/7 care;home with home health  -     Row Name 10/19/22 1419          Positioning and Restraints    Pre-Treatment Position in bed  -     Post Treatment Position chair  -KP     In Chair call light within reach  -           User Key  (r) = Recorded By, (t) = Taken By, (c) = Cosigned By    Initials Name Provider Type    Linn Richardson, OT Occupational Therapist               Outcome Measures     Row Name 10/19/22 1422          How much help from another is currently needed...    Putting on and taking off regular lower body clothing? 1  -KP     Bathing (including washing, rinsing, and drying) 2  -KP     Toileting (which  includes using toilet bed pan or urinal) 2  -KP     Putting on and taking off regular upper body clothing 2  -KP     Taking care of personal grooming (such as brushing teeth) 3  -KP     Eating meals 3  -KP     AM-PAC 6 Clicks Score (OT) 13  -KP     Row Name 10/19/22 0730          How much help from another person do you currently need...    Turning from your back to your side while in flat bed without using bedrails? 3  -VM     Moving from lying on back to sitting on the side of a flat bed without bedrails? 2  -VM     Moving to and from a bed to a chair (including a wheelchair)? 2  -VM     Standing up from a chair using your arms (e.g., wheelchair, bedside chair)? 1  -VM     Climbing 3-5 steps with a railing? 1  -VM     To walk in hospital room? 1  -VM     AM-PAC 6 Clicks Score (PT) 10  -VM     Highest level of mobility 4 --> Transferred to chair/commode  -VM           User Key  (r) = Recorded By, (t) = Taken By, (c) = Cosigned By    Initials Name Provider Type     Destinee Luz, RN Registered Nurse    Linn Richardson, OT Occupational Therapist                  OT Recommendation and Plan     Plan of Care Review  Plan of Care Reviewed With: patient  Progress: no change  Outcome Evaluation: Patient seen for OT evaluation. She was able to transition to edge of bed with minimal assist, but continues to require maximal assist X2 persons with rolling walker for transfer to chair. Patient able to progress tolerance for seated UE exercises. Continue POC, patient will need significant assist upon discharge.     Time Calculation:    Time Calculation- OT     Row Name 10/19/22 1422             Time Calculation- OT    OT Received On 10/19/22  -KP         Timed Charges    44387 - OT Therapeutic Exercise Minutes 24  -KP      14141 - OT Therapeutic Activity Minutes 15  -KP         Total Minutes    Timed Charges Total Minutes 39  -KP       Total Minutes 39  -KP            User Key  (r) = Recorded By, (t) = Taken By, (c) =  Cosigned By    Initials Name Provider Type     Linn Quevedo OT Occupational Therapist              Therapy Charges for Today     Code Description Service Date Service Provider Modifiers Qty    88498056450 HC OT THER PROC EA 15 MIN 10/18/2022 Linn Quevedo OT GO 1    09038547294 HC OT THERAPEUTIC ACT EA 15 MIN 10/18/2022 Linn Quevedo OT GO 1    34858974614 HC OT SELF CARE/MGMT/TRAIN EA 15 MIN 10/18/2022 Linn Quevedo OT GO 1    37397070957 HC OT THER PROC EA 15 MIN 10/19/2022 Linn Quevedo OT GO 2    51371310742 HC OT THERAPEUTIC ACT EA 15 MIN 10/19/2022 Linn Quevedo OT GO 1               Linn Quevedo OT  10/19/2022

## 2022-10-20 PROCEDURE — 97535 SELF CARE MNGMENT TRAINING: CPT

## 2022-10-20 PROCEDURE — 25010000002 ENOXAPARIN PER 10 MG

## 2022-10-20 PROCEDURE — 97530 THERAPEUTIC ACTIVITIES: CPT

## 2022-10-20 PROCEDURE — 97110 THERAPEUTIC EXERCISES: CPT

## 2022-10-20 PROCEDURE — 94799 UNLISTED PULMONARY SVC/PX: CPT

## 2022-10-20 PROCEDURE — 97164 PT RE-EVAL EST PLAN CARE: CPT

## 2022-10-20 PROCEDURE — 97116 GAIT TRAINING THERAPY: CPT

## 2022-10-20 PROCEDURE — 94761 N-INVAS EAR/PLS OXIMETRY MLT: CPT

## 2022-10-20 RX ADMIN — Medication 10 MG: at 20:00

## 2022-10-20 RX ADMIN — ENOXAPARIN SODIUM 80 MG: 80 INJECTION SUBCUTANEOUS at 20:00

## 2022-10-20 RX ADMIN — TAMSULOSIN HYDROCHLORIDE 0.4 MG: 0.4 CAPSULE ORAL at 08:31

## 2022-10-20 RX ADMIN — FUROSEMIDE 40 MG: 40 TABLET ORAL at 08:30

## 2022-10-20 RX ADMIN — METOPROLOL TARTRATE 12.5 MG: 25 TABLET, FILM COATED ORAL at 08:30

## 2022-10-20 RX ADMIN — Medication 10 ML: at 08:31

## 2022-10-20 RX ADMIN — HYDRALAZINE HYDROCHLORIDE 10 MG: 10 TABLET, FILM COATED ORAL at 05:02

## 2022-10-20 RX ADMIN — FUROSEMIDE 40 MG: 40 TABLET ORAL at 17:13

## 2022-10-20 RX ADMIN — GABAPENTIN 300 MG: 300 CAPSULE ORAL at 20:00

## 2022-10-20 RX ADMIN — DOCUSATE SODIUM 100 MG: 100 CAPSULE ORAL at 20:00

## 2022-10-20 RX ADMIN — ESCITALOPRAM 10 MG: 10 TABLET, FILM COATED ORAL at 08:31

## 2022-10-20 RX ADMIN — Medication 10 ML: at 21:34

## 2022-10-20 RX ADMIN — METOPROLOL TARTRATE 12.5 MG: 25 TABLET, FILM COATED ORAL at 20:00

## 2022-10-20 RX ADMIN — PANTOPRAZOLE SODIUM 40 MG: 40 TABLET, DELAYED RELEASE ORAL at 05:02

## 2022-10-20 RX ADMIN — HYDRALAZINE HYDROCHLORIDE 10 MG: 10 TABLET, FILM COATED ORAL at 21:34

## 2022-10-20 NOTE — PROGRESS NOTES
[facility]    Patient Identification:  Name:  Tre Kinney  Age:  89 y.o.  Sex:  female  :  1932  MRN:  [id number]  Visit number:  [id number]  Primary Care Provider:  Jessica Pedro APRN    Length of stay in inpatient status:  15    Brief Update     I saw the patient twice today, once this morning after breakfast and once on rounds with  Ileana Mckinley and  Erin.  The patient was in bed when I saw her this morning.  She was attempting to eat breakfast.  She had no complaints of pain, specifically she denied chest pain and musculoskeletal pain.  When I saw her the second time on rounds, her son, Leno, was at bedside.  The patient was sitting up in a chair at that time.  Leno states that the patient was able to do some strengthening exercises with strong encouragement from him.  Physical therapist Bhargavi verified to our rounding team that the patient had participated the most that she had during her entire time here when her son was doing the encouragement instead of our hospital team.  Therefore, I have informed Leno that we will try to 2 schedule the physical therapy around visits from the family.  However, if there is a time that the family would like to FaceTime or Zoom to see the patient, then we could time physical therapy visits around those family tele visits as well so that the patient can have the most amount of encouragement as it would be a combined effort from our staff and the family.  The [facility] has told our hospital that they would accept the patient for therapy.  We will continue to monitor the patient's strength while she works with our physical therapy department.    Please note that the patient does not have orthostatic hypotension as her blood pressures increased when she stood up; thus, dizziness does not seem to be a factor in the patient's reluctance to participate with physical therapy.   Currently, her blood pressures are at goal and stable.  We last obtained blood work on her on 10/17/2022; I have been trying to limit how much blood work is ordered so as to not induce blood loss anemia from frequent lab draws.  I will order blood work in the morning to make sure that it remains stable compared to the previous results.    Jannet Lagunas MD  [facility]  10/20/22  08:20 EDT

## 2022-10-20 NOTE — THERAPY RE-EVALUATION
Acute Care - Physical Therapy Re-Evaluation   [place]     Patient Name: Tre Kinney  : 1932  MRN: [id number]  Today's Date: 10/20/2022   Onset of Illness/Injury or Date of Surgery: 10/06/22  Visit Dx:   No diagnosis found.  Patient Active Problem List   Diagnosis   • Acid reflux   • Ankle arthralgia   • Cardiac conduction disorder   • Closed fracture of distal fibula   • Arteriosclerosis of coronary artery   • Fracture of distal end of tibia   • Elevated cholesterol   • BP (high blood pressure)   • Hypertrophic polyarthritis   • OP (osteoporosis)   • Right heart failure (HCC)   • Heart failure, chronic, right-sided (HCC)   • SOB (shortness of breath)   • Palpitations   • CHF (congestive heart failure) (HCC)   • Congestive heart failure (HCC)   • Precordial pain   • Chronic anticoagulation   • Hypokalemia   • Anemia, unspecified type   • Generalized weakness     Past Medical History:   Diagnosis Date   • Anemia    • Arthritis    • CHF (congestive heart failure) (HCC)    • Chronic kidney disease    • Coronary artery disease    • H/O blood clots    • History of transfusion     no reaction    • Hypertension      Past Surgical History:   Procedure Laterality Date   • ABDOMINAL SURGERY     • APPENDECTOMY     • CARDIAC SURGERY     •  SECTION     • COLONOSCOPY N/A 3/7/2018    Procedure: COLONOSCOPY;  Surgeon: Willie Gresham MD;  Location:  [facility];  Service:    • CORONARY ANGIOPLASTY WITH STENT PLACEMENT     • ENDOSCOPY N/A 3/6/2018    Procedure: ESOPHAGOGASTRODUODENOSCOPY;  Surgeon: Willie Gresham MD;  Location:  [facility];  Service:    • PACEMAKER IMPLANTATION      left side    • SKIN BIOPSY     • TUBAL ABDOMINAL LIGATION       PT Assessment (last 12 hours)     PT Evaluation and Treatment     Row Name 10/20/22 1533          Physical Therapy Time and Intention    Subjective Information complains of;weakness;fatigue  -CT     Document Type re-evaluation;therapy note (daily note)  BID  "treatment sessions in AM and PM  -CT     Mode of Treatment physical therapy  -CT     Patient Effort adequate  -CT     Comment AM treatment: Pt required encouragement to participate with therapy stating \"If it helps me get home, I'll do it\". Pt ambulated 4 ft to chair but continues to require repetitive verbal cueing to remain upright and prevent pt from sitting early during transfer. Pt put back to bed by nursing staff.  PM treatment: Performed with OT and Son in room. Pt more cooperative with treatment with family present. Pt again ambulated 4 ft to chair and then performed sitting BLE seated ther ex.  -CT     Row Name 10/20/22 1533          General Information    Patient Profile Reviewed yes  -CT     Existing Precautions/Restrictions fall;TLSO  pt continues to decline TLSO use  -CT     Limitations/Impairments safety/cognitive  -CT     Benefits Reviewed patient:;family:;improve function;increase independence;increase strength;increase balance;decrease pain;decrease risk of DVT;improve skin integrity;increase knowledge  -CT     Row Name 10/20/22 1533          Pain    Pretreatment Pain Rating --  pt reports pain in lower back but does not rate  -CT     Pain Intervention(s) Repositioned  -CT     Row Name 10/20/22 1533          Cognition    Affect/Mental Status (Cognition) WFL  -CT     Orientation Status (Cognition) oriented x 3  -CT     Follows Commands (Cognition) WFL  -CT     Row Name 10/20/22 1533          Range of Motion Comprehensive    Comment, General Range of Motion AROM grossly WFL  -CT     Row Name 10/20/22 1533          Strength Comprehensive (MMT)    Comment, General Manual Muscle Testing (MMT) Assessment BLE grossly 4-/5  -CT     Row Name 10/20/22 1533          Bed Mobility    Bed Mobility sit-supine;supine-sit;scooting/bridging;rolling left;rolling right  -CT     Rolling Left [place] (Bed Mobility) minimum assist (75% patient effort)  -CT     Rolling Right [place] (Bed Mobility) minimum assist " (75% patient effort)  -CT     Scooting/Bridging [place] (Bed Mobility) minimum assist (75% patient effort)  -CT     Supine-Sit [place] (Bed Mobility) moderate assist (50% patient effort)  -CT     Sit-Supine [place] (Bed Mobility) moderate assist (50% patient effort);2 person assist;verbal cues;nonverbal cues (demo/gesture)  assist with pts legs  -CT     Bed Mobility, Safety Issues decreased use of arms for pushing/pulling;decreased use of legs for bridging/pushing;impaired trunk control for bed mobility  -CT     Assistive Device (Bed Mobility) bed rails;draw sheet  -CT     Row Name 10/20/22 1533          Transfers    Transfers bed-chair transfer;chair-bed transfer  -CT     Row Name 10/20/22 1533          Bed-Chair Transfer    Bed-Chair [place] (Transfers) maximum assist (25% patient effort);2 person assist  -CT     Assistive Device (Bed-Chair Transfers) walker, front-wheeled  -CT     Row Name 10/20/22 1533          Chair-Bed Transfer    Chair-Bed [place] (Transfers) maximum assist (25% patient effort);dependent (less than 25% patient effort);2 person assist  -CT     Assistive Device (Chair-Bed Transfers) walker, front-wheeled  -CT     Row Name 10/20/22 1533          Sit-Stand Transfer    Sit-Stand [place] (Transfers) moderate assist (50% patient effort);2 person assist;verbal cues  1-2 asisst at times  -CT     Assistive Device (Sit-Stand Transfers) walker, front-wheeled  -CT     Row Name 10/20/22 1533          Stand-Sit Transfer    Stand-Sit [place] (Transfers) moderate assist (50% patient effort);verbal cues  -CT     Assistive Device (Stand-Sit Transfers) walker, front-wheeled  -CT     Row Name 10/20/22 1533          Gait/Stairs (Locomotion)    [place] Level (Gait) maximum assist (25% patient effort);dependent (less than 25% patient effort);2 person assist;verbal cues;nonverbal cues (demo/gesture)  during AM session pt requires constant verbal cueing to remain upright and not  sit early during transfer; during PM pt was able to remain upright during entire transfe to chair with very little cueing  -CT     Assistive Device (Gait) walker, front-wheeled  -CT     Distance in Feet (Gait) 4 ft during transfer to chair (AM and PM)  -CT     Pattern (Gait) step-to  -CT     Deviations/Abnormal Patterns (Gait) gait speed decreased;festinating/shuffling;weight shifting decreased  -CT     Bilateral Gait Deviations weight shift ability decreased;forward flexed posture;knee buckling bilaterally  -CT     Row Name 10/20/22 1530          Safety Issues, Functional Mobility    Safety Issues Affecting Function (Mobility) safety precaution awareness  -CT     Row Name 10/20/22 1537          Balance    Static Sitting Balance contact guard;minimal assist  pt lean back when sitting EOB  -CT     Dynamic Sitting Balance minimal assist;moderate assist  -CT     Position, Sitting Balance sitting edge of bed  -CT     Static Standing Balance moderate assist;maximum assist;2-person assist  -CT     Position/Device Used, Standing Balance walker, front-wheeled  -CT     Row Name 10/20/22 1537          Motor Skills    Motor Skills functional endurance  -CT     Therapeutic Exercise --  PM session: BLE seated ther ex LAQ; AP, marches, glut sets 15 reps each  -CT     Row Name 10/20/22 9005          Coping    Observed Emotional State calm;cooperative  -CT     Verbalized Emotional State acceptance  -CT     Trust Relationship/Rapport care explained;questions answered  -CT     Family/Support Persons family  -CT     Involvement in Care --  present during PM session  -CT     Row Name 10/20/22 0559          Plan of Care Review    Plan of Care Reviewed With patient  -CT     Row Name 10/20/22 1533          Positioning and Restraints    Pre-Treatment Position in bed  both sessions  -CT     Post Treatment Position chair  both sessions  -CT     In Chair call light within reach;encouraged to call for assist;exit alarm on;notified nsg  Son in  "room after PM session  -CT     Row Name 10/20/22 1533          Therapy Assessment/Plan (PT)    Patient/Family Therapy Goals Statement (PT) Pt goals are to \"go home\"  -CT     Rehab Potential (PT) fair, will monitor progress closely  -CT     Criteria for Skilled Interventions Met (PT) yes;meets criteria  -CT     Therapy Frequency (PT) 5 times/wk  5-6 times/wk  -CT     Row Name 10/20/22 1533          Progress Summary (PT)    Progress Toward Functional Goals (PT) progress toward functional goals is fair  -CT     Row Name 10/20/22 1533          Physical Therapy Goals    Bed Mobility Goal Selection (PT) bed mobility, PT goal 1  -CT     Transfer Goal Selection (PT) transfer, PT goal 1  -CT     Gait Training Goal Selection (PT) gait training, PT goal 1  -CT     Row Name 10/20/22 1533          Bed Mobility Goal 1 (PT)    Activity/Assistive Device (Bed Mobility Goal 1, PT) bed mobility activities, all  -CT     [place] Level/Cues Needed (Bed Mobility Goal 1, PT) minimum assist (75% or more patient effort)  -CT     Time Frame (Bed Mobility Goal 1, PT) by discharge  -CT     Progress/Outcomes (Bed Mobility Goal 1, PT) goal not met  -CT     Row Name 10/20/22 1533          Transfer Goal 1 (PT)    Activity/Assistive Device (Transfer Goal 1, PT) sit-to-stand/stand-to-sit;bed-to-chair/chair-to-bed  -CT     [place] Level/Cues Needed (Transfer Goal 1, PT) minimum assist (75% or more patient effort)  -CT     Time Frame (Transfer Goal 1, PT) by discharge  -CT     Progress/Outcome (Transfer Goal 1, PT) goal not met  -CT     Row Name 10/20/22 1533          Gait Training Goal 1 (PT)    Activity/Assistive Device (Gait Training Goal 1, PT) gait (walking locomotion);assistive device use  -CT     [place] Level (Gait Training Goal 1, PT) minimum assist (75% or more patient effort)  -CT     Distance (Gait Training Goal 1, PT) 30  -CT     Time Frame (Gait Training Goal 1, PT) by discharge  -CT     Progress/Outcome (Gait Training Goal " 1, PT) goal not met  -CT           User Key  (r) = Recorded By, (t) = Taken By, (c) = Cosigned By    Initials Name Provider Type    CT Bhargavi Vásquez, PT Physical Therapist                  PT Recommendation and Plan  Anticipated Discharge Disposition (PT): home with /7 [facility], skilled nursing facility  Planned Therapy Interventions (PT): balance training, bed mobility training, gait training, home exercise program, motor coordination training, manual therapy techniques, neuromuscular re-education, postural re-education, patient/family education, strengthening, transfer training  Therapy Frequency (PT): 5 times/wk (5-6 times/wk)  Progress Summary (PT)  Progress Toward Functional Goals (PT): progress toward functional goals is fair  Plan of Care Reviewed With: patient  Progress: no change   Outcome Measures     Row Name 10/20/22 1500             How much help from another person do you currently need...    Turning from your back to your side while in flat bed without using bedrails? 3  -CT      Moving from lying on back to sitting on the side of a flat bed without bedrails? 2  -CT      Moving to and from a bed to a chair (including a wheelchair)? 2  -CT      Standing up from a chair using your arms (e.g., wheelchair, bedside chair)? 2  -CT      Climbing 3-5 steps with a railing? 1  -CT      To walk in hospital room? 2  -CT      AM-PAC 6 Clicks Score (PT) 12  -CT         How much help from another is currently needed...    Putting on and taking off regular lower body clothing? 1  -LA      Bathing (including washing, rinsing, and drying) 2  -LA      Toileting (which includes using toilet bed pan or urinal) 2  -LA      Putting on and taking off regular upper body clothing 3  -LA      Taking care of personal grooming (such as brushing teeth) 3  -LA      Eating meals 3  -LA      AM-PAC 6 Clicks Score (OT) 14  -LA         Functional Assessment    Outcome Measure Options AM-PAC 6 Clicks Daily Activity (OT)  -LA             User Key  (r) = Recorded By, (t) = Taken By, (c) = Cosigned By    Initials Name Provider Type    CT Bhargavi Vásquez, EDEL Physical Therapist    Dianna Barreto, OT Occupational Therapist                 Time Calculation:    PT Charges     Row Name 10/20/22 1557 10/20/22 1556          Time Calculation    PT Non-Billable Time (min) -- 30 min  re eval  -CT     PT Received On 10/20/22  -CT 10/20/22  -CT     PT Goal Re-Cert Due Date -- 11/03/22  -CT        Time Calculation- PT    Total Timed Code Minutes- PT 39 minute(s)  PM session  -CT 33 minute(s)  AM session  -CT           User Key  (r) = Recorded By, (t) = Taken By, (c) = Cosigned By    Initials Name Provider Type    CT Bhargavi Vásquez, PT Physical Therapist              Therapy Charges for Today     Code Description Service Date Service Provider Modifiers Qty    46419170002 HC GAIT TRAINING EA 15 MIN 10/19/2022 Bhargavi Vásquez, PT GP 1    08738281090 HC PT THERAPEUTIC ACT EA 15 MIN 10/19/2022 Bhargavi Vásquez, PT GP 1    35571712809 HC PT THER PROC EA 15 MIN 10/19/2022 Bhargavi Vásquez, PT GP 1    22691513223 HC GAIT TRAINING EA 15 MIN 10/20/2022 Bhargavi Vásquez, PT GP 1    03000083114 HC PT THERAPEUTIC ACT EA 15 MIN 10/20/2022 Bhargavi Vásquez, PT GP 1    91122794317 HC GAIT TRAINING EA 15 MIN 10/20/2022 Bhargavi Vásquez, PT GP 1    15044487286 HC PT THER PROC EA 15 MIN 10/20/2022 Bhargavi Vásquez, PT GP 1    25731609576 HC PT THERAPEUTIC ACT EA 15 MIN 10/20/2022 Bhargavi Vásquez, PT GP 1    80743992389 HC PT RE-EVAL ESTABLISHED PLAN 2 10/20/2022 Bhargavi Vásquez, PT GP 1          PT G-Codes  Outcome Measure Options: AM-PAC 6 Clicks Daily Activity (OT)  AM-PAC 6 Clicks Score (PT): 12  AM-PAC 6 Clicks Score (OT): 14    Bhargavi Vásquez PT  10/20/2022

## 2022-10-20 NOTE — THERAPY TREATMENT NOTE
Acute Care - Occupational Therapy Treatment Note   [place]     Patient Name: Tre Kinney  : 1932  MRN: [id number]  Today's Date: 10/20/2022  Onset of Illness/Injury or Date of Surgery: 10/06/22     Referring Physician: Janneth    Admit Date: 10/5/2022     No diagnosis found.  Patient Active Problem List   Diagnosis   • Acid reflux   • Ankle arthralgia   • Cardiac conduction disorder   • Closed fracture of distal fibula   • Arteriosclerosis of coronary artery   • Fracture of distal end of tibia   • Elevated cholesterol   • BP (high blood pressure)   • Hypertrophic polyarthritis   • OP (osteoporosis)   • Right heart failure (HCC)   • Heart failure, chronic, right-sided (HCC)   • SOB (shortness of breath)   • Palpitations   • CHF (congestive heart failure) (HCC)   • Congestive heart failure (HCC)   • Precordial pain   • Chronic anticoagulation   • Hypokalemia   • Anemia, unspecified type   • Generalized weakness     Past Medical History:   Diagnosis Date   • Anemia    • Arthritis    • CHF (congestive heart failure) ([facility])    • Chronic kidney disease    • Coronary artery disease    • H/O blood clots    • History of transfusion     no reaction    • Hypertension      Past Surgical History:   Procedure Laterality Date   • ABDOMINAL SURGERY     • APPENDECTOMY     • CARDIAC SURGERY     •  SECTION     • COLONOSCOPY N/A 3/7/2018    Procedure: COLONOSCOPY;  Surgeon: Willie Gresham MD;  Location:  [facility];  Service:    • CORONARY ANGIOPLASTY WITH STENT PLACEMENT     • ENDOSCOPY N/A 3/6/2018    Procedure: ESOPHAGOGASTRODUODENOSCOPY;  Surgeon: Willie Gresham MD;  Location:  [facility];  Service:    • PACEMAKER IMPLANTATION      left side    • SKIN BIOPSY     • TUBAL ABDOMINAL LIGATION           OT ASSESSMENT FLOWSHEET (last 12 hours)     OT Evaluation and Treatment     Row Name 10/20/22 1508                   OT Time and Intention    Subjective Information complains of;fatigue  -LA        Document  "Type therapy note (daily note)  -LA        Mode of Treatment occupational therapy  -LA        Patient Effort adequate  -LA        Symptoms Noted During/After Treatment fatigue  -LA           General Information    Patient Profile Reviewed yes  -LA        General Observations of Patient Patient seen BID this date. Patient agreeable to therapy both sessions and did get OOB to chair during both sessions this date. Patient son present during PM sesssion and provided motivation to get OOB. Patient never refuses therapy however does require motivation to complete exercises, this date patient reported \"no\" but then stated \"if it will help me get home I'll do it.\"  -LA        Existing Precautions/Restrictions fall  -LA           Pain Assessment    Pain Location - back  -LA        Pain Intervention(s) --  positioning  -LA           Cognition    Affect/Mental Status (Cognition) WFL  -LA        Orientation Status (Cognition) oriented x 3  -LA        Follows Commands (Cognition) WFL  -LA           Grooming Assessment/Training    [place] Level (Grooming) hair care, combing/brushing;standby assist;verbal cues  -LA           Bed Mobility    Supine-Sit [place] (Bed Mobility) minimum assist (75% patient effort);nonverbal cues (demo/gesture);verbal cues  -LA           Bed-Chair Transfer    Bed-Chair [place] (Transfers) moderate assist (50% patient effort);2 person assist  -LA        Assistive Device (Bed-Chair Transfers) walker, front-wheeled  -LA           Sit-Stand Transfer    Sit-Stand [place] (Transfers) moderate assist (50% patient effort)  -LA           Motor Skills    Motor Skills coordination;functional endurance  -LA        Therapeutic Exercise shoulder;elbow/forearm;wrist;hand  -LA        Additional Documentation --  yellow theraband exercises performed 15 x2 sets this date. Cues required to maximize ROM for optimal rehab benefit.  -LA           Plan of Care Review    Plan of Care Reviewed With patient  " -LA        Progress improving  -LA           Positioning and Restraints    Pre-Treatment Position in bed  -LA        Post Treatment Position chair  -LA        In Chair call light within reach;encouraged to call for assist  family present post-treatment PM visit  -LA              User Key  (r) = Recorded By, (t) = Taken By, (c) = Cosigned By    Initials Name Effective Dates    Dianna Barreto, OT 02/14/22 -                        OT Recommendation and Plan  Planned Therapy Interventions (OT): activity tolerance training, patient/caregiver education/training, adaptive equipment training, ROM/therapeutic exercise, occupation/activity based interventions, BADL retraining, strengthening exercise, transfer/mobility retraining, functional balance retraining  Therapy Frequency (OT): 6 times/wk  Plan of Care Review  Plan of Care Reviewed With: patient  Progress: improving  Outcome Evaluation: OT evaluation completed this date. Patient engaged in UE tasks with focus on trunk control and fx reaching to promote increased independence with ADLs. Patient has had a significant decline in independence and safety with ADLs resulting in need for skilled OT services. OT to address deficits to allow patient to max level of independence prior to d/c. Patient admitted for swing bed and OT discussed activities. Patient given word search puzzles to promote increase activity. patient also reports she watches television. Family also stated they could bring in books for entertainment as needed.  Plan of Care Reviewed With: patient  Outcome Evaluation: OT evaluation completed this date. Patient engaged in UE tasks with focus on trunk control and fx reaching to promote increased independence with ADLs. Patient has had a significant decline in independence and safety with ADLs resulting in need for skilled OT services. OT to address deficits to allow patient to max level of independence prior to d/c. Patient admitted for swing bed and OT  discussed activities. Patient given word search puzzles to promote increase activity. patient also reports she watches television. Family also stated they could bring in books for entertainment as needed.     Outcome Measures     Row Name 10/20/22 1500             How much help from another is currently needed...    Putting on and taking off regular lower body clothing? 1  -LA      Bathing (including washing, rinsing, and drying) 2  -LA      Toileting (which includes using toilet bed pan or urinal) 2  -LA      Putting on and taking off regular upper body clothing 3  -LA      Taking care of personal grooming (such as brushing teeth) 3  -LA      Eating meals 3  -LA      AM-PAC 6 Clicks Score (OT) 14  -LA         Functional Assessment    Outcome Measure Options AM-PAC 6 Clicks Daily Activity (OT)  -LA            User Key  (r) = Recorded By, (t) = Taken By, (c) = Cosigned By    Initials Name Provider Type    Dianna Barreto OT Occupational Therapist                Time Calculation:    Time Calculation- OT     Row Name 10/20/22 1520             Time Calculation- OT    Total Timed Code Minutes- OT 45 minute(s)  -LA            User Key  (r) = Recorded By, (t) = Taken By, (c) = Cosigned By    Initials Name Provider Type    Dianna Barreto OT Occupational Therapist              Therapy Charges for Today     Code Description Service Date Service Provider Modifiers Qty    57091237402  OT THERAPEUTIC ACT EA 15 MIN 10/20/2022 Dianna Siddiqi OT GO 1    [id number]  OT SELF CARE/MGMT/TRAIN EA 15 MIN 10/20/2022 Dianna Siddiqi OT GO 1    [id number]  OT THER PROC EA 15 MIN 10/20/2022 Dianna Siddiqi OT GO 1               Dianna Siddiqi OT  10/20/2022

## 2022-10-20 NOTE — CASE MANAGEMENT/SOCIAL WORK
Discharge Planning Assessment   [place]     Patient Name: Tre Kinney  MRN: [id number]  Today's Date: 10/20/2022    Admit Date: 10/5/2022     Discharge Plan     Row Name 10/20/22 1400       Plan    Plan SS received a call from Chana at [facility] stating she plans to verify pt's insurance benefits. SS to follow.              Continued Care and Services - Admitted Since 10/5/2022     Destination     Service Provider Request Status Selected Services Address Phone Fax Patient Preferred    [facility] Pending - Request Sent N/A [address] [place] [address] [phone] --    [facility] Pending - Request Sent N/A [address] [facility] [place] [address] [phone] --    [facility] Pending - Request Sent N/A [address] [place] [address] [phone] --            Selected Continued Care - Prior Encounters Includes continued care and service providers with selected services from prior encounters from 7/7/2022 to 10/20/2022    Discharged on 10/5/2022 Admission date: 9/19/2022 - Discharge disposition: Swing Bed    Destination     Service Provider Selected Services Address Phone Fax Patient Preferred     [place] [facility] [address] [facility] [place] [address] [phone] -- --                    LENORE Koch

## 2022-10-20 NOTE — PLAN OF CARE
Goal Outcome Evaluation:  Plan of Care Reviewed With: patient        Progress: no change  Outcome Evaluation: Pt resting in bed during during assessment. No complaints at this time. Vital signs stable, no acute changes at this time. Will continue to monitor.

## 2022-10-20 NOTE — PLAN OF CARE
Goal Outcome Evaluation:  Plan of Care Reviewed With: patient        Progress: no change  Outcome Evaluation: Pt's resting in bed, no c/o, no s/s of distress noted. Pt's swing bed, worked with PT, sat up in chair. VSS. Will con't to monitor.

## 2022-10-21 LAB
ALBUMIN SERPL-MCNC: 2.68 G/DL (ref 3.5–5.2)
ALBUMIN/GLOB SERPL: 0.9 G/DL
ALP SERPL-CCNC: 47 U/L (ref 39–117)
ALT SERPL W P-5'-P-CCNC: 5 U/L (ref 1–33)
ANION GAP SERPL CALCULATED.3IONS-SCNC: 12 MMOL/L (ref 5–15)
AST SERPL-CCNC: 11 U/L (ref 1–32)
BILIRUB SERPL-MCNC: 0.5 MG/DL (ref 0–1.2)
BUN SERPL-MCNC: 32 MG/DL (ref 8–23)
BUN/CREAT SERPL: 16.2 (ref 7–25)
CALCIUM SPEC-SCNC: 8.8 MG/DL (ref 8.6–10.5)
CHLORIDE SERPL-SCNC: 99 MMOL/L (ref 98–107)
CO2 SERPL-SCNC: 26 MMOL/L (ref 22–29)
CREAT SERPL-MCNC: 1.98 MG/DL (ref 0.57–1)
DEPRECATED RDW RBC AUTO: 59 FL (ref 37–54)
EGFRCR SERPLBLD CKD-EPI 2021: 23.8 ML/MIN/1.73
ERYTHROCYTE [DISTWIDTH] IN BLOOD BY AUTOMATED COUNT: 16.9 % (ref 12.3–15.4)
GLOBULIN UR ELPH-MCNC: 2.9 GM/DL
GLUCOSE SERPL-MCNC: 107 MG/DL (ref 65–99)
HCT VFR BLD AUTO: 29.1 % (ref 34–46.6)
HGB BLD-MCNC: 9.6 G/DL (ref 12–15.9)
INDURATION: 0 MM (ref 0–10)
Lab: NORMAL
Lab: NORMAL
MAGNESIUM SERPL-MCNC: 1.9 MG/DL (ref 1.6–2.4)
MCH RBC QN AUTO: 31.4 PG (ref 26.6–33)
MCHC RBC AUTO-ENTMCNC: 33 G/DL (ref 31.5–35.7)
MCV RBC AUTO: 95.1 FL (ref 79–97)
PHOSPHATE SERPL-MCNC: 3.7 MG/DL (ref 2.5–4.5)
PLATELET # BLD AUTO: 198 10*3/MM3 (ref 140–450)
PMV BLD AUTO: 9.7 FL (ref 6–12)
POTASSIUM SERPL-SCNC: 3.3 MMOL/L (ref 3.5–5.2)
PROT SERPL-MCNC: 5.6 G/DL (ref 6–8.5)
RBC # BLD AUTO: 3.06 10*6/MM3 (ref 3.77–5.28)
SODIUM SERPL-SCNC: 137 MMOL/L (ref 136–145)
TB SKIN TEST: NEGATIVE
WBC NRBC COR # BLD: 7.43 10*3/MM3 (ref 3.4–10.8)

## 2022-10-21 PROCEDURE — 83735 ASSAY OF MAGNESIUM: CPT | Performed by: INTERNAL MEDICINE

## 2022-10-21 PROCEDURE — 0 MAGNESIUM SULFATE 4 GM/100ML SOLUTION: Performed by: INTERNAL MEDICINE

## 2022-10-21 PROCEDURE — 94761 N-INVAS EAR/PLS OXIMETRY MLT: CPT

## 2022-10-21 PROCEDURE — 85027 COMPLETE CBC AUTOMATED: CPT | Performed by: INTERNAL MEDICINE

## 2022-10-21 PROCEDURE — 97110 THERAPEUTIC EXERCISES: CPT

## 2022-10-21 PROCEDURE — 80053 COMPREHEN METABOLIC PANEL: CPT | Performed by: INTERNAL MEDICINE

## 2022-10-21 PROCEDURE — 25010000002 ENOXAPARIN PER 10 MG: Performed by: INTERNAL MEDICINE

## 2022-10-21 PROCEDURE — 94799 UNLISTED PULMONARY SVC/PX: CPT

## 2022-10-21 PROCEDURE — 84100 ASSAY OF PHOSPHORUS: CPT | Performed by: INTERNAL MEDICINE

## 2022-10-21 PROCEDURE — 97530 THERAPEUTIC ACTIVITIES: CPT

## 2022-10-21 PROCEDURE — 97116 GAIT TRAINING THERAPY: CPT

## 2022-10-21 RX ORDER — MAGNESIUM SULFATE HEPTAHYDRATE 40 MG/ML
2 INJECTION, SOLUTION INTRAVENOUS AS NEEDED
Status: DISCONTINUED | OUTPATIENT
Start: 2022-10-21 | End: 2023-01-13 | Stop reason: HOSPADM

## 2022-10-21 RX ORDER — POTASSIUM CHLORIDE 20 MEQ/1
40 TABLET, EXTENDED RELEASE ORAL EVERY 4 HOURS
Status: COMPLETED | OUTPATIENT
Start: 2022-10-21 | End: 2022-10-21

## 2022-10-21 RX ORDER — MAGNESIUM SULFATE HEPTAHYDRATE 40 MG/ML
4 INJECTION, SOLUTION INTRAVENOUS ONCE
Status: COMPLETED | OUTPATIENT
Start: 2022-10-21 | End: 2022-10-21

## 2022-10-21 RX ORDER — DIPHENOXYLATE HYDROCHLORIDE AND ATROPINE SULFATE 2.5; .025 MG/1; MG/1
1 TABLET ORAL DAILY
Status: DISCONTINUED | OUTPATIENT
Start: 2022-10-21 | End: 2023-01-13 | Stop reason: HOSPADM

## 2022-10-21 RX ORDER — ENOXAPARIN SODIUM 100 MG/ML
1 INJECTION SUBCUTANEOUS EVERY 24 HOURS
Status: DISCONTINUED | OUTPATIENT
Start: 2022-10-21 | End: 2022-10-27 | Stop reason: ALTCHOICE

## 2022-10-21 RX ORDER — POTASSIUM CHLORIDE 1.5 G/1.77G
40 POWDER, FOR SOLUTION ORAL AS NEEDED
Status: DISCONTINUED | OUTPATIENT
Start: 2022-10-21 | End: 2023-01-13 | Stop reason: HOSPADM

## 2022-10-21 RX ORDER — MAGNESIUM SULFATE HEPTAHYDRATE 40 MG/ML
4 INJECTION, SOLUTION INTRAVENOUS AS NEEDED
Status: DISCONTINUED | OUTPATIENT
Start: 2022-10-21 | End: 2023-01-13 | Stop reason: HOSPADM

## 2022-10-21 RX ORDER — POTASSIUM CHLORIDE 20 MEQ/1
40 TABLET, EXTENDED RELEASE ORAL AS NEEDED
Status: DISCONTINUED | OUTPATIENT
Start: 2022-10-21 | End: 2023-01-13 | Stop reason: HOSPADM

## 2022-10-21 RX ORDER — POTASSIUM CHLORIDE 7.45 MG/ML
10 INJECTION INTRAVENOUS
Status: DISCONTINUED | OUTPATIENT
Start: 2022-10-21 | End: 2023-01-13 | Stop reason: HOSPADM

## 2022-10-21 RX ADMIN — HYDRALAZINE HYDROCHLORIDE 10 MG: 10 TABLET, FILM COATED ORAL at 13:53

## 2022-10-21 RX ADMIN — Medication 10 ML: at 20:04

## 2022-10-21 RX ADMIN — ESCITALOPRAM 10 MG: 10 TABLET, FILM COATED ORAL at 08:27

## 2022-10-21 RX ADMIN — HYDRALAZINE HYDROCHLORIDE 10 MG: 10 TABLET, FILM COATED ORAL at 05:18

## 2022-10-21 RX ADMIN — HYDRALAZINE HYDROCHLORIDE 10 MG: 10 TABLET, FILM COATED ORAL at 21:15

## 2022-10-21 RX ADMIN — POTASSIUM CHLORIDE 40 MEQ: 20 TABLET, EXTENDED RELEASE ORAL at 21:16

## 2022-10-21 RX ADMIN — Medication 10 ML: at 08:28

## 2022-10-21 RX ADMIN — FUROSEMIDE 40 MG: 40 TABLET ORAL at 08:27

## 2022-10-21 RX ADMIN — DOCUSATE SODIUM 100 MG: 100 CAPSULE ORAL at 20:04

## 2022-10-21 RX ADMIN — POTASSIUM CHLORIDE 40 MEQ: 20 TABLET, EXTENDED RELEASE ORAL at 17:39

## 2022-10-21 RX ADMIN — PANTOPRAZOLE SODIUM 40 MG: 40 TABLET, DELAYED RELEASE ORAL at 05:18

## 2022-10-21 RX ADMIN — MAGNESIUM SULFATE HEPTAHYDRATE 4 G: 40 INJECTION, SOLUTION INTRAVENOUS at 17:39

## 2022-10-21 RX ADMIN — Medication 1 TABLET: at 17:39

## 2022-10-21 RX ADMIN — FUROSEMIDE 40 MG: 40 TABLET ORAL at 17:06

## 2022-10-21 RX ADMIN — METOPROLOL TARTRATE 12.5 MG: 25 TABLET, FILM COATED ORAL at 20:04

## 2022-10-21 RX ADMIN — TAMSULOSIN HYDROCHLORIDE 0.4 MG: 0.4 CAPSULE ORAL at 08:28

## 2022-10-21 RX ADMIN — METOPROLOL TARTRATE 12.5 MG: 25 TABLET, FILM COATED ORAL at 08:28

## 2022-10-21 RX ADMIN — GABAPENTIN 300 MG: 300 CAPSULE ORAL at 20:03

## 2022-10-21 RX ADMIN — ENOXAPARIN SODIUM 70 MG: 80 INJECTION SUBCUTANEOUS at 20:04

## 2022-10-21 RX ADMIN — Medication 10 MG: at 20:03

## 2022-10-21 NOTE — PROGRESS NOTES
[facility]    Patient Identification:  Name:  Tre Kinney  Age:  89 y.o.  Sex:  female  :  1932  MRN:  [id number]  Visit number:  [id number]  Primary Care Provider:  Jessica Pedro APRN    Length of stay in inpatient status:  16    Brief Update     When I saw the patient today, she was laying in the bed supine.  She told me that she had just finished eating breakfast and sitting in the recliner.  She denies pain anywhere.  She also denies coughing, denies shortness of air, and denies nausea.  I have talked to both  Erin and palliative care NP Gisele.    Labs today show that the potassium and magnesium levels are below goal (2-2.2 for magnesium and 4-4.5 for potassium).    LABS:    CBC and coagulation:  Results from last 7 days   Lab Units 10/21/22  0158 10/17/22  0507 10/16/22  0135   WBC 10*3/mm3 7.43 7.35 6.37   HEMOGLOBIN g/dL 9.6* 9.8* 10.0*   HEMATOCRIT % 29.1* 30.9* 31.7*   MCV fL 95.1 96.6 96.9   MCHC g/dL 33.0 31.7 31.5   PLATELETS 10*3/mm3 198 197 186     Renal and electrolytes:  Results from last 7 days   Lab Units 10/21/22  0158 10/17/22  0507 10/16/22  0135   SODIUM mmol/L 137 136 136   POTASSIUM mmol/L 3.3* 3.8 4.1   MAGNESIUM mg/dL 1.9  --   --    CHLORIDE mmol/L 99 98 97*   CO2 mmol/L 26.0 27.7 26.3   BUN mg/dL 32* 34* 37*   CREATININE mg/dL 1.98* 1.98* 2.22*   CALCIUM mg/dL 8.8 8.9 9.1   PHOSPHORUS mg/dL 3.7  --   --    GLUCOSE mg/dL 107* 108* 113*     Estimated Creatinine Clearance: 22.1 mL/min (A) (by C-G formula based on SCr of 1.98 mg/dL (H)).    Liver and pancreatic function:  Results from last 7 days   Lab Units 10/21/22  0158   ALBUMIN g/dL 2.68*   BILIRUBIN mg/dL 0.5   ALK PHOS U/L 47   AST (SGOT) U/L 11   ALT (SGPT) U/L 5       I have written for both the potassium and magnesium replacement protocol and will start a multivitamin, which may help increase her appetite as well.      Jannet Lagunas MD  [facility]  [facility]  10/21/22  16:49 EDT

## 2022-10-21 NOTE — THERAPY TREATMENT NOTE
Acute Care - Physical Therapy Treatment Note   [place]     Patient Name: Tre Kinney  : 1932  MRN: [id number]  Today's Date: 10/21/2022   Onset of Illness/Injury or Date of Surgery: 10/06/22  Visit Dx:   No diagnosis found.  Patient Active Problem List   Diagnosis   • Acid reflux   • Ankle arthralgia   • Cardiac conduction disorder   • Closed fracture of distal fibula   • Arteriosclerosis of coronary artery   • Fracture of distal end of tibia   • Elevated cholesterol   • BP (high blood pressure)   • Hypertrophic polyarthritis   • OP (osteoporosis)   • Right heart failure (HCC)   • Heart failure, chronic, right-sided (HCC)   • SOB (shortness of breath)   • Palpitations   • CHF (congestive heart failure) (HCC)   • Congestive heart failure (HCC)   • Precordial pain   • Chronic anticoagulation   • Hypokalemia   • Anemia, unspecified type   • Generalized weakness     Past Medical History:   Diagnosis Date   • Anemia    • Arthritis    • CHF (congestive heart failure) (HCC)    • Chronic kidney disease    • Coronary artery disease    • H/O blood clots    • History of transfusion     no reaction    • Hypertension      Past Surgical History:   Procedure Laterality Date   • ABDOMINAL SURGERY     • APPENDECTOMY     • CARDIAC SURGERY     •  SECTION     • COLONOSCOPY N/A 3/7/2018    Procedure: COLONOSCOPY;  Surgeon: Willie Gresham MD;  Location:  [facility];  Service:    • CORONARY ANGIOPLASTY WITH STENT PLACEMENT     • ENDOSCOPY N/A 3/6/2018    Procedure: ESOPHAGOGASTRODUODENOSCOPY;  Surgeon: Willie Gresham MD;  Location:  [facility];  Service:    • PACEMAKER IMPLANTATION      left side    • SKIN BIOPSY     • TUBAL ABDOMINAL LIGATION       PT Assessment (last 12 hours)     PT Evaluation and Treatment     Row Name 10/21/22 1449          Physical Therapy Time and Intention    Subjective Information complains of;weakness;fatigue  -CT     Document Type therapy note (daily note)  -CT     Mode of  Treatment physical therapy  -CT     Patient Effort good  -CT     Comment Pt tolerated treatment session well. Pt participated with BLE ther ex and was able to increase ambulation distance to 8 ft. Pt continues to try to sit early before safely getting into position for stand to sit.  -CT     Row Name 10/21/22 1449          General Information    Patient Profile Reviewed yes  -CT     Existing Precautions/Restrictions fall;TLSO  pt continues to decline TLSO use  -CT     Limitations/Impairments safety/cognitive  -CT     Row Name 10/21/22 1449          Pain    Pretreatment Pain Rating --  no pain reported this date  -CT     Row Name 10/21/22 1449          Cognition    Affect/Mental Status (Cognition) WFL  -CT     Orientation Status (Cognition) oriented x 3  -CT     Follows Commands (Cognition) WFL  -CT     Row Name 10/21/22 1449          Bed Mobility    Bed Mobility sit-supine;supine-sit;scooting/bridging;rolling left;rolling right  -CT     Rolling Left [place] (Bed Mobility) minimum assist (75% patient effort)  -CT     Rolling Right [place] (Bed Mobility) minimum assist (75% patient effort)  -CT     Scooting/Bridging [place] (Bed Mobility) minimum assist (75% patient effort)  -CT     Supine-Sit [place] (Bed Mobility) moderate assist (50% patient effort)  -CT     Sit-Supine [place] (Bed Mobility) moderate assist (50% patient effort);2 person assist;verbal cues;nonverbal cues (demo/gesture)  assist with pts legs  -CT     Bed Mobility, Safety Issues decreased use of arms for pushing/pulling;decreased use of legs for bridging/pushing;impaired trunk control for bed mobility  -CT     Assistive Device (Bed Mobility) bed rails;draw sheet  -CT     Row Name 10/21/22 1449          Transfers    Transfers bed-chair transfer;chair-bed transfer  -CT     Row Name 10/21/22 1449          Chair-Bed Transfer    Chair-Bed [place] (Transfers) moderate assist (50% patient effort);maximum assist (25% patient  effort);2 person assist  initally Mod A x 2 but becomes Max A x 2 toward end  -CT     Assistive Device (Chair-Bed Transfers) walker, front-wheeled  -CT     Row Name 10/21/22 1449          Sit-Stand Transfer    Sit-Stand [place] (Transfers) moderate assist (50% patient effort);2 person assist;verbal cues  -CT     Assistive Device (Sit-Stand Transfers) walker, front-wheeled  -CT     Row Name 10/21/22 1449          Stand-Sit Transfer    Stand-Sit [place] (Transfers) moderate assist (50% patient effort);2 person assist;verbal cues  pt attempts to sit early before getting all the way back into position  -CT     Assistive Device (Stand-Sit Transfers) walker, front-wheeled  -CT     Row Name 10/21/22 1449          Gait/Stairs (Locomotion)    Honor Level (Gait) moderate assist (50% patient effort);maximum assist (25% patient effort);2 person assist;verbal cues;nonverbal cues (demo/gesture)  initially Mod A x 2 but becomes Max A x 2  -CT     Assistive Device (Gait) walker, front-wheeled  -CT     Distance in Feet (Gait) 8  -CT     Pattern (Gait) step-to  -CT     Deviations/Abnormal Patterns (Gait) gait speed decreased;festinating/shuffling;weight shifting decreased  -CT     Bilateral Gait Deviations weight shift ability decreased;forward flexed posture;knee buckling bilaterally  -CT     Row Name 10/21/22 1440          Motor Skills    Therapeutic Exercise --  BLE sitting ther ex: LAQ, AP, marches, hamstring curls yellow TB, glut sets x 15 reps each  -CT     Row Name 10/21/22 1444          Coping    Observed Emotional State calm;cooperative  -CT     Verbalized Emotional State acceptance  -CT     Family/Support Persons family  -CT     Involvement in Care --  present during session  -CT     Row Name 10/21/22 1441          Plan of Care Review    Plan of Care Reviewed With patient  -CT     Row Name 10/21/22 1444          Positioning and Restraints    Pre-Treatment Position sitting in chair/recliner  -CT     Post  Treatment Position bed  -CT     In Bed supine;call light within reach;encouraged to call for assist;exit alarm on;with family/caregiver;side rails up x3  -CT     Row Name 10/21/22 1449          Therapy Assessment/Plan (PT)    Rehab Potential (PT) fair, will monitor progress closely  -CT     Criteria for Skilled Interventions Met (PT) yes;meets criteria  -CT     Therapy Frequency (PT) 5 times/wk  5-6 times/wk  -CT           User Key  (r) = Recorded By, (t) = Taken By, (c) = Cosigned By    Initials Name Provider Type    CT Bhargavi Vásquez, PT Physical Therapist                  PT Recommendation and Plan  Anticipated Discharge Disposition (PT): home with /7 care, skilled nursing facility  Planned Therapy Interventions (PT): balance training, bed mobility training, gait training, home exercise program, motor coordination training, manual therapy techniques, neuromuscular re-education, postural re-education, patient/family education, strengthening, transfer training  Therapy Frequency (PT): 5 times/wk (5-6 times/wk)  Progress Summary (PT)  Progress Toward Functional Goals (PT): progress toward functional goals is fair  Plan of Care Reviewed With: patient  Progress: no change   Outcome Measures     Row Name 10/20/22 1500             How much help from another person do you currently need...    Turning from your back to your side while in flat bed without using bedrails? 3  -CT      Moving from lying on back to sitting on the side of a flat bed without bedrails? 2  -CT      Moving to and from a bed to a chair (including a wheelchair)? 2  -CT      Standing up from a chair using your arms (e.g., wheelchair, bedside chair)? 2  -CT      Climbing 3-5 steps with a railing? 1  -CT      To walk in hospital room? 2  -CT      AM-PAC 6 Clicks Score (PT) 12  -CT         How much help from another is currently needed...    Putting on and taking off regular lower body clothing? 1  -LA      Bathing (including washing, rinsing, and  drying) 2  -LA      Toileting (which includes using toilet bed pan or urinal) 2  -LA      Putting on and taking off regular upper body clothing 3  -LA      Taking care of personal grooming (such as brushing teeth) 3  -LA      Eating meals 3  -LA      AM-PAC 6 Clicks Score (OT) 14  -LA         Functional Assessment    Outcome Measure Options AM-PAC 6 Clicks Daily Activity (OT)  -LA            User Key  (r) = Recorded By, (t) = Taken By, (c) = Cosigned By    Initials Name Provider Type    CT Bhargavi Vásquez, PT Physical Therapist    Dianna Barreto, VELASQUEZ Occupational Therapist                 Time Calculation:    PT Charges     Row Name 10/21/22 1458             Time Calculation    PT Received On 10/21/22  -CT         Time Calculation- PT    Total Timed Code Minutes- PT 40 minute(s)  -CT            User Key  (r) = Recorded By, (t) = Taken By, (c) = Cosigned By    Initials Name Provider Type    CT Bhargavi Vásquez, PT Physical Therapist              Therapy Charges for Today     Code Description Service Date Service Provider Modifiers Qty    71462919539 HC GAIT TRAINING EA 15 MIN 10/20/2022 Bhargavi Vásquez, PT GP 1    32082503161 HC PT THERAPEUTIC ACT EA 15 MIN 10/20/2022 Bhargavi Vásquez, PT GP 1    74239297346 HC GAIT TRAINING EA 15 MIN 10/20/2022 Bhargavi Vásquez, PT GP 1    [id number]  PT THER PROC EA 15 MIN 10/20/2022 Bhargavi Vásquez, PT GP 1    29623344101 HC PT THERAPEUTIC ACT EA 15 MIN 10/20/2022 Bhargavi Vásquez, PT GP 1    [id number]  PT RE-EVAL ESTABLISHED PLAN 2 10/20/2022 Bhargavi Vásquez, PT GP 1    13341746525 HC GAIT TRAINING EA 15 MIN 10/21/2022 Bhargavi Vásquez, PT GP 1    97427267984 HC PT THERAPEUTIC ACT EA 15 MIN 10/21/2022 Bhargavi Vásquez, PT GP 1    [id number]  PT THER PROC EA 15 MIN 10/21/2022 Bhargavi Vásquez, PT GP 1          PT G-Codes  Outcome Measure Options: AM-PAC 6 Clicks Daily Activity (OT)  AM-PAC 6 Clicks Score (PT): 12  AM-PAC 6 Clicks Score (OT): 14    Bhargavi Vásquez  PT  10/21/2022

## 2022-10-21 NOTE — PLAN OF CARE
Goal Outcome Evaluation:               PT has rested well in bed this shift. No c/o or s/sx of distress, will continue to monitor for changes.

## 2022-10-21 NOTE — PLAN OF CARE
Goal Outcome Evaluation:  Plan of Care Reviewed With: patient        Progress: no change  Outcome Evaluation: Pt's resting in bed, no c/o, no s/s of distress noted. Pt been up for breakfast and lunch, sat up in chair, worked with PT.  VSS. Will con't to monitor.

## 2022-10-21 NOTE — PAYOR COMM NOTE
"Michelet Jacques RN  Swing Bed Nurse  [phone] Ex 4902 FAX: [phone]  [email or web address]  [facility]  NPI [id number]  Reference Number [id number]    Patient needs continued PT/OT for strength and mobility training     Ambulation - Extensive 2 person assist  Transfers - Extensive 2 person assist  Bed Mobility - Extensive 1 person assist  WC Mobility - NA  Stair Training  - NA  Feeding - Set up only no physical assistance  Grooming - Moderate assistance of 1  Bathing - Extensive assist of 1 in all of bathing  Dressing - Moderate assistance of 1  Toileting - Extensive assistance of 1 or 2      Tre Kinney (89 y.o. Female)    YOB: 1932  Social Security Number:   Address: [address] [place] [address] Phone: [phone]  MRN: [id number]  Jewish: Jew  Marital Status:         Admission Date: 10/5/22  Admission Type: Urgent  Admitting Provider: Jannet Lagunas MD  Attending Provider: Jannet Lagunas MD  Department, Room/Bed: [place] 859506/  Discharge Date:   Discharge Disposition:   Discharge Destination:               Attending Provider: Jannet Lagunas MD    Allergies: Adhesive Tape, Xanax [Alprazolam]    Isolation: None   Infection: None   Code Status: No CPR    Ht: 170.2 cm (67.01\")   Wt: 72.7 kg (160 lb 3.2 oz)    Admission Cmt: None   Principal Problem: None                Active Insurance as of 10/5/2022     Primary Coverage     Payor Plan Insurance Group Employer/Plan Group    ANTHEM MEDICARE REPLACEMENT ANTHEM MEDICARE ADVANTAGE [id number]     Payor Plan Address Payor Plan Phone Number Payor Plan Fax Number Effective Dates    [address] [phone]  1/1/2022 - None Entered    [facility] [id number]       Subscriber Name Subscriber Birth Date Member ID       TRE KINNEY 12/9/1932 [id number]                 Emergency Contacts      (Rel.) Home Phone Work Phone Mobile Phone "    Valdez Kinney (Son) [phone] None None    Leno Kniney (Son) [phone] None [phone]    Willian Kinney (Son) None None [phone]              Current Facility-Administered Medications   Medication Dose Route Frequency Provider Last Rate Last Admin   • docusate sodium (COLACE) capsule 100 mg  100 mg Oral BID Jannet Lagunas MD   100 mg at 10/20/22 2000   • Enoxaparin Sodium (LOVENOX) syringe 80 mg  1 mg/kg Subcutaneous Q24H Ngoc Luo PharmD   80 mg at 10/20/22 2000   • escitalopram (LEXAPRO) tablet 10 mg  10 mg Oral Daily Jannet Lagunas MD   10 mg at 10/20/22 0831   • furosemide (LASIX) tablet 40 mg  40 mg Oral BID Jannet Lagunas MD   40 mg at 10/20/22 1713   • gabapentin (NEURONTIN) capsule 300 mg  300 mg Oral Nightly Jannet Lagunas MD   300 mg at 10/20/22 2000   • hydrALAZINE (APRESOLINE) tablet 10 mg  10 mg Oral Q8H Mustapha Loredo DO   10 mg at 10/21/22 0518   • HYDROcodone-acetaminophen (NORCO) 7.5-325 MG per tablet 1 tablet  1 tablet Oral Q8H PRN Jannet Lagunas MD   1 tablet at 10/10/22 0121   • ipratropium-albuterol (DUO-NEB) nebulizer solution 3 mL  3 mL Nebulization Q6H PRN Jannet Lagunas MD   3 mL at 10/15/22 0814   • melatonin tablet 10 mg  10 mg Oral Nightly Jannet Lagunas MD   10 mg at 10/20/22 2000   • metoprolol tartrate (LOPRESSOR) tablet 12.5 mg  12.5 mg Oral Q12H Jannet Lagunas MD   12.5 mg at 10/20/22 2000   • ondansetron (ZOFRAN) injection 4 mg  4 mg Intravenous Q6H PRN Jannet Lagunas MD       • pantoprazole (PROTONIX) EC tablet 40 mg  40 mg Oral Q AM Jannet Lagunas MD   40 mg at 10/21/22 0518   • phenol (CHLORASEPTIC) 1.4 % liquid 1 spray  1 spray Mouth/Throat Q2H PRN Jannet Lagunas MD   1 spray at 10/13/22 2056   • polyethylene glycol (MIRALAX) packet 17 g  17 g Oral BID PRN Jannet Lagunas MD       • prochlorperazine (COMPAZINE) injection 5 mg  5 mg Intravenous Q6H PRN Jannet Lagunas MD        Or   •  prochlorperazine (COMPAZINE) tablet 5 mg  5 mg Oral Q6H PRN Jannet Lagunas MD        Or   • prochlorperazine (COMPAZINE) suppository 25 mg  25 mg Rectal Q12H PRN Jannet Lagunas MD       • promethazine (PHENERGAN) 6.25 mg in sodium chloride 0.9 % 50 mL  6.25 mg Intravenous Q6H PRN Jannet Lagunas MD       • sodium chloride 0.9 % flush 10 mL  10 mL Intravenous PRN Jannet Lagunas MD       • sodium chloride 0.9 % flush 10 mL  10 mL Intravenous Q12H Jannet Lagunas MD   10 mL at 10/20/22 2134   • sodium chloride 0.9 % flush 10 mL  10 mL Intravenous PRN Jannet Lagunas MD       • sodium chloride 0.9 % flush 10 mL  10 mL Intravenous Q12H Jannet Lagunas MD   10 mL at 10/20/22 2134   • sodium chloride 0.9 % flush 10 mL  10 mL Intravenous PRN Jannet Lagunas MD       • tamsulosin (FLOMAX) 24 hr capsule 0.4 mg  0.4 mg Oral Daily Jannet Lagunas MD   0.4 mg at 10/20/22 0831        Physician Progress Notes (most recent note)      Jannet Lagunas MD at 10/20/22 0820              [facility]    Patient Identification:  Name:  Tre Kinney  Age:  89 y.o.  Sex:  female  :  1932  MRN:  [id number]  Visit number:  [id number]  Primary Care Provider:  Jessica Pedro APRN    Length of stay in inpatient status:  15    Brief Update     I saw the patient twice today, once this morning after breakfast and once on rounds with  Ileana Mckinley and  Erin.  The patient was in bed when I saw her this morning.  She was attempting to eat breakfast.  She had no complaints of pain, specifically she denied chest pain and musculoskeletal pain.  When I saw her the second time on rounds, her son, Leno, was at bedside.  The patient was sitting up in a chair at that time.  Leno states that the patient was able to do some strengthening exercises with strong encouragement from him.  Physical therapist Bhargavi verified to our rounding team that  the patient had participated the most that she had during her entire time here when her son was doing the encouragement instead of our hospital team.  Therefore, I have informed Leno that we will try to 2 schedule the physical therapy around visits from the family.  However, if there is a time that the family would like to FaceTime or Zoom to see the patient, then we could time physical therapy visits around those family tele visits as well so that the patient can have the most amount of encouragement as it would be a combined effort from our staff and the family.  [facility] [place] [facility] that they would accept the patient for therapy.  We will continue to monitor the patient's strength while she works with our physical therapy department.    Please note that the patient does not have orthostatic hypotension as her blood pressures increased when she stood up; thus, dizziness does not seem to be a factor in the patient's reluctance to participate with physical therapy.  Currently, her blood pressures are at goal and stable.  We last obtained blood work on her on 10/17/2022; I have been trying to limit how much blood work is ordered so as to not induce blood loss anemia from frequent lab draws.  I will order blood work in the morning to make sure that it remains stable compared to the previous results.    Jannet Lagunas MD  [facility]  10/20/22  08:20 EDT      Electronically signed by Jannet Lagunas MD at 10/20/22 1736          Consult Notes (most recent note)      Liyah Jaquez APRN at 10/10/22 1145      Consult Orders    1. Inpatient Palliative Care MD Consult [[id number]] ordered by Mustapha Loredo DO at 10/10/22 0919               Palliative Care Initial Consult     Attending Physician: Mustapha Loredo, *  Referring Provider: Mustapha Loredo    Palliative care reason for consult:GOC/ACP support for pt and family.  Code Status:   Code  Status and Medical Interventions:   Ordered at: 10/05/22 1253     Medical Intervention Limits:    NO intubation (DNI)     Level Of Support Discussed With:    Patient    Next of Kin (If No Surrogate)     Code Status (Patient has no pulse and is not breathing):    No CPR (Do Not Attempt to Resuscitate)     Medical Interventions (Patient has pulse or is breathing):    Limited Support     Comments:    Patient states that she does not want to be resuscitated or put on a ventilator, her son Leno supports her decision      Advanced Directives: Advance Directive Status: Patient does not have advance directive   Healthcare surrogate: RUSSEL three adult sons, Valdez, Leno and Willian.  Goals of Care: Tre confirmed that she does not want to be resuscitated or to be put on ventilator, and would like to return home with son Valdez when she is stronger, she is currently swing bed/working with PT.    HPI:  Tre Kinney is a 89 y.o. female admitted on 10/5/2022 to swing bed for physical therapy for ten days. She was originally admitted to the hospital on 9/19/2022 due to a fall and symptomatic anemia. She has a medical history CHF, chronic kidney disease, coronary artery disease s/p stenting, history of blood clots, chronic anticoagulation with Coumadin, SSS s/p pacemaker placement, chronic diastolic CHF, and iron deficiency anemia who presents today with a chief complaint of a fall. Per pt she has become progressively more weak and has experienced several falls. Pt has sustained a moderate acute superior endplate vertebral compression fracture at the L1 with about 40% loss of vertebral body height and mild posterior osseous retropulsion of about 6 mm and also noted to have chronic compression fracture deformities involving the inferior endplates of L3 and L4 due to her first fall and from her second fall she does have a large hematoma on her right side, which led her to this admission. Pt had a hemoglobin of 6.1 for which she received  2 units of PRBC's. Pt denies any known source of bleeding and does say she had a scope(EGD colonoscopy at [facility] in 2018.        ROS: Negative except as above in HPI.     Past Medical History:   Diagnosis Date   • Anemia    • Arthritis    • CHF (congestive heart failure) (HCC)    • Chronic kidney disease    • Coronary artery disease    • H/O blood clots    • History of transfusion     no reaction    • Hypertension      Past Surgical History:   Procedure Laterality Date   • ABDOMINAL SURGERY     • APPENDECTOMY     • CARDIAC SURGERY     •  SECTION     • COLONOSCOPY N/A 3/7/2018    Procedure: COLONOSCOPY;  Surgeon: Willie Gresham MD;  Location:  [facility];  Service:    • CORONARY ANGIOPLASTY WITH STENT PLACEMENT     • ENDOSCOPY N/A 3/6/2018    Procedure: ESOPHAGOGASTRODUODENOSCOPY;  Surgeon: Willie Gresham MD;  Location:  [facility];  Service:    • PACEMAKER IMPLANTATION      left side    • SKIN BIOPSY     • TUBAL ABDOMINAL LIGATION       Social History     Socioeconomic History   • Marital status:    Tobacco Use   • Smoking status: Never   Substance and Sexual Activity   • Alcohol use: No   • Drug use: No   • Sexual activity: Defer     Family History   Problem Relation Age of Onset   • Other Mother         cardiac disorder   • Hypertension Mother    • Heart disease Mother    • Thyroid disease Mother    • Other Father         cardiac disorder   • Heart disease Father    • Hypertension Father    • Other Sister         cardiac disorder   • Other Brother         cardiac disorder   • Other Other         cardiac disorder   • Other Other         cardiac disorder       Allergies   Allergen Reactions   • Adhesive Tape Dermatitis   • Xanax [Alprazolam] Unknown - Low Severity       Current Facility-Administered Medications   Medication Dose Route Frequency Provider Last Rate Last Admin   • docusate sodium (COLACE) capsule 100 mg  100 mg Oral BID Jannet Lagunas MD   100 mg at 10/10/22 0824   •  escitalopram (LEXAPRO) tablet 10 mg  10 mg Oral Daily Jannet Lagunas MD   10 mg at 10/10/22 0824   • furosemide (LASIX) tablet 40 mg  40 mg Oral BID Jannet Lagunas MD   40 mg at 10/10/22 0824   • gabapentin (NEURONTIN) capsule 300 mg  300 mg Oral Nightly Jannet Lagunas MD   300 mg at 10/09/22 2132   • heparin (porcine) 5000 UNIT/ML injection 5,000 Units  5,000 Units Subcutaneous Q12H Jannet Lagunas MD   5,000 Units at 10/10/22 0824   • hydrALAZINE (APRESOLINE) tablet 25 mg  25 mg Oral Q8H Jannet Lagunas MD       • HYDROcodone-acetaminophen (NORCO) 7.5-325 MG per tablet 1 tablet  1 tablet Oral Q8H PRN Jannet Lagunas MD   1 tablet at 10/10/22 0121   • ipratropium-albuterol (DUO-NEB) nebulizer solution 3 mL  3 mL Nebulization Q6H PRN Jannet Lagunas MD   3 mL at 10/10/22 0745   • melatonin tablet 10 mg  10 mg Oral Nightly Jannet Lagunas MD   10 mg at 10/09/22 2132   • metoprolol tartrate (LOPRESSOR) tablet 12.5 mg  12.5 mg Oral Q12H Jannet Lagunas MD   12.5 mg at 10/10/22 0824   • ondansetron (ZOFRAN) injection 4 mg  4 mg Intravenous Q6H PRN Jannet Lagunas MD       • pantoprazole (PROTONIX) EC tablet 40 mg  40 mg Oral Q AM Jannet Lagunas MD   40 mg at 10/10/22 0443   • phenol (CHLORASEPTIC) 1.4 % liquid 1 spray  1 spray Mouth/Throat Q2H PRN Jannet Lagunas MD       • polyethylene glycol (MIRALAX) packet 17 g  17 g Oral BID PRN Jannet Lagunas MD       • prochlorperazine (COMPAZINE) injection 5 mg  5 mg Intravenous Q6H PRN Jannet Lagunas MD        Or   • prochlorperazine (COMPAZINE) tablet 5 mg  5 mg Oral Q6H PRN Jannet Lagunas MD        Or   • prochlorperazine (COMPAZINE) suppository 25 mg  25 mg Rectal Q12H PRN Jannet Lagunas MD       • promethazine (PHENERGAN) 6.25 mg in sodium chloride 0.9 % 50 mL  6.25 mg Intravenous Q6H PRN Jannet Lagunas MD       • sodium chloride 0.9 % flush 10 mL  10 mL Intravenous PRN Jannet Lagunas MD       • sodium chloride  "0.9 % flush 10 mL  10 mL Intravenous Q12H Jannet Lagunas MD   10 mL at 10/10/22 0824   • sodium chloride 0.9 % flush 10 mL  10 mL Intravenous PRN Jannet Lagunas MD       • sodium chloride 0.9 % flush 10 mL  10 mL Intravenous Q12H Jannet Lagunas MD   10 mL at 10/10/22 0824   • sodium chloride 0.9 % flush 10 mL  10 mL Intravenous PRN Jannet Lagunas MD       • [START ON 10/19/2022] tuberculin injection 5 Units  5 Units Intradermal Once Jannet Lagunas MD            •  HYDROcodone-acetaminophen  •  ipratropium-albuterol  •  ondansetron  •  phenol  •  polyethylene glycol  •  prochlorperazine **OR** prochlorperazine **OR** prochlorperazine  •  promethazine  •  sodium chloride  •  sodium chloride  •  sodium chloride    Current medication reviewed for route, type, dose and frequency and are current per MAR.    Palliative Performance Scale Score:     /78 (BP Location: Left arm, Patient Position: Lying)   Pulse 77   Temp 98.1 °F (36.7 °C) (Oral)   Resp 16   Ht 170.2 cm (67.01\")   Wt 71.8 kg (158 lb 3.2 oz)   SpO2 95%   BMI 24.77 kg/m²     Intake/Output Summary (Last 24 hours) at 10/10/2022 1405  Last data filed at 10/10/2022 0410  Gross per 24 hour   Intake 360 ml   Output 1200 ml   Net -840 ml       PE:  General Appearance:    Pt appears weak, alert, cooperative, NAD   HEENT:    NC/AT, without obvious abnormality, EOMI, anicteric, nasal congestion present   Neck:   supple, trachea midline, no JVD   Lungs:     Regular even respirations, CTA, on room air, no distress    Heart:    RRR, normal S1 and S2, no M/R/G   Abdomen:     Soft, NT, ND, NABS    Extremities:   Moves all extremities, no edema   Pulses:   Pulses palpable and equal bilaterally   Skin:   Warm, dry   Neurologic:   A/Ox3, cooperative, weakness   Psych:   Calm, appropriate       Labs:   Results from last 7 days   Lab Units 10/10/22  0246   WBC 10*3/mm3 6.70   HEMOGLOBIN g/dL 10.3*   HEMATOCRIT % 31.6*   PLATELETS 10*3/mm3 201 "     Results from last 7 days   Lab Units 10/10/22  0246   SODIUM mmol/L 138   POTASSIUM mmol/L 3.3*   CHLORIDE mmol/L 99   CO2 mmol/L 25.5   BUN mg/dL 39*   CREATININE mg/dL 2.40*   GLUCOSE mg/dL 116*   CALCIUM mg/dL 9.0     Results from last 7 days   Lab Units 10/10/22  0246 10/07/22  0047 10/05/22  0311   SODIUM mmol/L 138   < > 137   POTASSIUM mmol/L 3.3*   < > 3.7   CHLORIDE mmol/L 99   < > 98   CO2 mmol/L 25.5   < > 24.1   BUN mg/dL 39*   < > 21   CREATININE mg/dL 2.40*   < > 1.90*   CALCIUM mg/dL 9.0   < > 9.3   BILIRUBIN mg/dL  --   --  0.8   ALK PHOS U/L  --   --  66   ALT (SGPT) U/L  --   --  6   AST (SGOT) U/L  --   --  11   GLUCOSE mg/dL 116*   < > 101*    < > = values in this interval not displayed.     Imaging Results (Last 72 Hours)     ** No results found for the last 72 hours. **          Diagnostics: Reviewed    A: Tre Kinney is a 89 y.o. female admitted on 10/5/2022 to [facility] for physical therapy for ten days. She was originally admitted to the hospital on 9/19/2022 due to a fall and symptomatic anemia. She has a medical history CHF, chronic kidney disease, coronary artery disease s/p stenting, history of blood clots, chronic anticoagulation with Coumadin, SSS s/p pacemaker placement, chronic diastolic CHF, and iron deficiency anemia who presents today with a chief complaint of a fall. Per pt she has become progressively more weak and has experienced several falls. Pt has sustained a moderate acute superior endplate vertebral compression fracture at the L1 with about 40% loss of vertebral body height and mild posterior osseous retropulsion of about 6 mm and also noted to have chronic compression fracture deformities involving the inferior endplates of L3 and L4 due to her first fall and from her second fall she does have a large hematoma on her right side, which led her to this admission. Pt had a hemoglobin of 6.1 for which she received 2 units of PRBC's. Pt denies any known source of  bleeding and does say she had a scope(EGD colonoscopy at [facility] in 2018.    Today 10/10/2022  Pt was awake alert and oriented, she was afebrile, on room air saturating 95%, HR 77, RR 16 and BP of 153/78. Tre denied any pain, nausea, anxiety, or shortness of breath at this time.           P:   Palliative was consulted to discuss GOC/ACP support for pt and family. Palliative was able to speak with patients son Leno today to let him know that we were re-consulted. We let him know that Tre was working with PT when we entered the room at lunch time, and that we helped set up her lunch and she was eating at her potato, and that Tre told us to tell him that she was trying. Leno was under the impression that depending on how well Tre works with Therapy that she may be able to get an extension of swing bed days. I have reached out to Kelly in  to check and see what Tre's options would be, whether it be an extension on swing bed days or would she qualify for rehab placement. I will continue to follow up with patient and family.    We appreciate the consult and the opportunity to participate in Tre Kinney's care. We will continue to follow along. Please do not hesitate to contact us regarding further symptom management or goals of care needs, including after hours or on weekends via our on call provider at [phone].     Time: 30 minutes spent reviewing medical and medication records, assessing and examining patient, discussing with family, answering questions, providing some guidance about a plan and documentation of care, and coordinating care with other healthcare members, with > 50% time spent face to face.     LANCE Cantu    10/10/2022      Electronically signed by Liyah Jaquez APRN at 10/10/22 1430          Nutrition Notes (most recent note)      Magi Oneal RD at 10/13/22 0848        Nutrition Services    Patient Name:  Tre Kinney  YOB: 1932  MRN: [id number]  Admit Date:   10/5/2022    BMI: 25.98  Diet: Soft Texture, Chopped, Thin   Supplement: Magic Cup BID  Intake: 31% avg over 12 meals  Fluid Intake:  Not meeting needs.     Electronically signed by:  Magi Oneal RD  10/13/22 08:31 EDT     Electronically signed by Magi Oneal RD at 10/13/22 0832          Physical Therapy Notes (most recent note)      Bhargavi Vásquez, PT at 10/20/22 1600  Version 1 of 1         Acute Care - Physical Therapy Re-Evaluation  [facility]     Patient Name: Tre Kinney  : 1932  MRN: [id number]  Today's Date: 10/20/2022   Onset of Illness/Injury or Date of Surgery: 10/06/22  Visit Dx:   No diagnosis found.  Patient Active Problem List   Diagnosis   • Acid reflux   • Ankle arthralgia   • Cardiac conduction disorder   • Closed fracture of distal fibula   • Arteriosclerosis of coronary artery   • Fracture of distal end of tibia   • Elevated cholesterol   • BP (high blood pressure)   • Hypertrophic polyarthritis   • OP (osteoporosis)   • Right heart failure (HCC)   • Heart failure, chronic, right-sided (HCC)   • SOB (shortness of breath)   • Palpitations   • CHF (congestive heart failure) (HCC)   • Congestive heart failure (HCC)   • Precordial pain   • Chronic anticoagulation   • Hypokalemia   • Anemia, unspecified type   • Generalized weakness     Past Medical History:   Diagnosis Date   • Anemia    • Arthritis    • CHF (congestive heart failure) (HCC)    • Chronic kidney disease    • Coronary artery disease    • H/O blood clots    • History of transfusion     no reaction    • Hypertension      Past Surgical History:   Procedure Laterality Date   • ABDOMINAL SURGERY     • APPENDECTOMY     • CARDIAC SURGERY     •  SECTION     • COLONOSCOPY N/A 3/7/2018    Procedure: COLONOSCOPY;  Surgeon: Willie Gresham MD;  Location: [facility] ENDOSCOPY;  Service:    • CORONARY ANGIOPLASTY WITH STENT PLACEMENT     • ENDOSCOPY N/A 3/6/2018    Procedure: ESOPHAGOGASTRODUODENOSCOPY;  Surgeon: Willie Gresham  "MD;  Location: [facility] ENDOSCOPY;  Service:    • PACEMAKER IMPLANTATION      left side    • SKIN BIOPSY     • TUBAL ABDOMINAL LIGATION       PT Assessment (last 12 hours)     PT Evaluation and Treatment     Row Name 10/20/22 1665          Physical Therapy Time and Intention    Subjective Information complains of;weakness;fatigue  -CT     Document Type re-evaluation;therapy note (daily note)  BID treatment sessions in AM and PM  -CT     Mode of Treatment physical therapy  -CT     Patient Effort adequate  -CT     Comment AM treatment: Pt required encouragement to participate with therapy stating \"If it helps me get home, I'll do it\". Pt ambulated 4 ft to chair but continues to require repetitive verbal cueing to remain upright and prevent pt from sitting early during transfer. Pt put back to bed by nursing staff.  PM treatment: Performed with OT and Son in room. Pt more cooperative with treatment with family present. Pt again ambulated 4 ft to chair and then performed sitting BLE seated ther ex.  -CT     Row Name 10/20/22 9900          General Information    Patient Profile Reviewed yes  -CT     Existing Precautions/Restrictions fall;TLSO  pt continues to decline TLSO use  -CT     Limitations/Impairments safety/cognitive  -CT     Benefits Reviewed patient:;family:;improve function;increase independence;increase strength;increase balance;decrease pain;decrease risk of DVT;improve skin integrity;increase knowledge  -CT     Row Name 10/20/22 0381          Pain    Pretreatment Pain Rating --  pt reports pain in lower back but does not rate  -CT     Pain Intervention(s) Repositioned  -CT     Row Name 10/20/22 0659          Cognition    Affect/Mental Status (Cognition) WFL  -CT     Orientation Status (Cognition) oriented x 3  -CT     Follows Commands (Cognition) WFL  -CT     Row Name 10/20/22 9968          Range of Motion Comprehensive    Comment, General Range of Motion AROM grossly WFL  -CT     Row Name 10/20/22 4913       "    Strength Comprehensive (MMT)    Comment, General Manual Muscle Testing (MMT) Assessment BLE grossly 4-/5  -CT     Row Name 10/20/22 1533          Bed Mobility    Bed Mobility sit-supine;supine-sit;scooting/bridging;rolling left;rolling right  -CT     Rolling Left [place] (Bed Mobility) minimum assist (75% patient effort)  -CT     Rolling Right [place] (Bed Mobility) minimum assist (75% patient effort)  -CT     Scooting/Bridging [place] (Bed Mobility) minimum assist (75% patient effort)  -CT     Supine-Sit [place] (Bed Mobility) moderate assist (50% patient effort)  -CT     Sit-Supine [place] (Bed Mobility) moderate assist (50% patient effort);2 person assist;verbal cues;nonverbal cues (demo/gesture)  assist with pts legs  -CT     Bed Mobility, Safety Issues decreased use of arms for pushing/pulling;decreased use of legs for bridging/pushing;impaired trunk control for bed mobility  -CT     Assistive Device (Bed Mobility) bed rails;draw sheet  -CT     Row Name 10/20/22 1533          Transfers    Transfers bed-chair transfer;chair-bed transfer  -CT     Row Name 10/20/22 1533          Bed-Chair Transfer    Bed-Chair [place] (Transfers) maximum assist (25% patient effort);2 person assist  -CT     Assistive Device (Bed-Chair Transfers) walker, front-wheeled  -CT     Row Name 10/20/22 1533          Chair-Bed Transfer    Chair-Bed [place] (Transfers) maximum assist (25% patient effort);dependent (less than 25% patient effort);2 person assist  -CT     Assistive Device (Chair-Bed Transfers) walker, front-wheeled  -CT     Row Name 10/20/22 1533          Sit-Stand Transfer    Sit-Stand [place] (Transfers) moderate assist (50% patient effort);2 person assist;verbal cues  1-2 asisst at times  -CT     Assistive Device (Sit-Stand Transfers) walker, front-wheeled  -CT     Row Name 10/20/22 1533          Stand-Sit Transfer    Stand-Sit [place] (Transfers) moderate assist (50% patient  effort);verbal cues  -CT     Assistive Device (Stand-Sit Transfers) walker, front-wheeled  -CT     Row Name 10/20/22 1533          Gait/Stairs (Locomotion)    [place] Level (Gait) maximum assist (25% patient effort);dependent (less than 25% patient effort);2 person assist;verbal cues;nonverbal cues (demo/gesture)  during AM session pt requires constant verbal cueing to remain upright and not sit early during transfer; during PM pt was able to remain upright during entire transfe to chair with very little cueing  -CT     Assistive Device (Gait) walker, front-wheeled  -CT     Distance in Feet (Gait) 4 ft during transfer to chair (AM and PM)  -CT     Pattern (Gait) step-to  -CT     Deviations/Abnormal Patterns (Gait) gait speed decreased;festinating/shuffling;weight shifting decreased  -CT     Bilateral Gait Deviations weight shift ability decreased;forward flexed posture;knee buckling bilaterally  -CT     Row Name 10/20/22 1533          Safety Issues, Functional Mobility    Safety Issues Affecting Function (Mobility) safety precaution awareness  -CT     Row Name 10/20/22 1538          Balance    Static Sitting Balance contact guard;minimal assist  pt lean back when sitting EOB  -CT     Dynamic Sitting Balance minimal assist;moderate assist  -CT     Position, Sitting Balance sitting edge of bed  -CT     Static Standing Balance moderate assist;maximum assist;2-person assist  -CT     Position/Device Used, Standing Balance walker, front-wheeled  -CT     Row Name 10/20/22 1530          Motor Skills    Motor Skills functional endurance  -CT     Therapeutic Exercise --  PM session: BLE seated ther ex LAQ; AP, marches, glut sets 15 reps each  -CT     Row Name 10/20/22 1539          Coping    Observed Emotional State calm;cooperative  -CT     Verbalized Emotional State acceptance  -CT     Trust Relationship/Rapport care explained;questions answered  -CT     Family/Support Persons family  -CT     Involvement in Care --   "present during PM session  -CT     Row Name 10/20/22 1533          Plan of Care Review    Plan of Care Reviewed With patient  -CT     Row Name 10/20/22 1533          Positioning and Restraints    Pre-Treatment Position in bed  both sessions  -CT     Post Treatment Position chair  both sessions  -CT     In Chair call light within reach;encouraged to call for assist;exit alarm on;notified nsg  Son in room after PM session  -CT     Row Name 10/20/22 1533          Therapy Assessment/Plan (PT)    Patient/Family Therapy Goals Statement (PT) Pt goals are to \"go home\"  -CT     Rehab Potential (PT) fair, will monitor progress closely  -CT     Criteria for Skilled Interventions Met (PT) yes;meets criteria  -CT     Therapy Frequency (PT) 5 times/wk  5-6 times/wk  -CT     Row Name 10/20/22 1533          Progress Summary (PT)    Progress Toward Functional Goals (PT) progress toward functional goals is fair  -CT     Row Name 10/20/22 1533          Physical Therapy Goals    Bed Mobility Goal Selection (PT) bed mobility, PT goal 1  -CT     Transfer Goal Selection (PT) transfer, PT goal 1  -CT     Gait Training Goal Selection (PT) gait training, PT goal 1  -CT     Row Name 10/20/22 1533          Bed Mobility Goal 1 (PT)    Activity/Assistive Device (Bed Mobility Goal 1, PT) bed mobility activities, all  -CT     [place] Level/Cues Needed (Bed Mobility Goal 1, PT) minimum assist (75% or more patient effort)  -CT     Time Frame (Bed Mobility Goal 1, PT) by discharge  -CT     Progress/Outcomes (Bed Mobility Goal 1, PT) goal not met  -CT     Row Name 10/20/22 1533          Transfer Goal 1 (PT)    Activity/Assistive Device (Transfer Goal 1, PT) sit-to-stand/stand-to-sit;bed-to-chair/chair-to-bed  -CT     [place] Level/Cues Needed (Transfer Goal 1, PT) minimum assist (75% or more patient effort)  -CT     Time Frame (Transfer Goal 1, PT) by discharge  -CT     Progress/Outcome (Transfer Goal 1, PT) goal not met  -CT     Row Name " 10/20/22 1533          Gait Training Goal 1 (PT)    Activity/Assistive Device (Gait Training Goal 1, PT) gait (walking locomotion);assistive device use  -CT     [place] Level (Gait Training Goal 1, PT) minimum assist (75% or more patient effort)  -CT     Distance (Gait Training Goal 1, PT) 30  -CT     Time Frame (Gait Training Goal 1, PT) by discharge  -CT     Progress/Outcome (Gait Training Goal 1, PT) goal not met  -CT           User Key  (r) = Recorded By, (t) = Taken By, (c) = Cosigned By    Initials Name Provider Type    CT Bhargavi Vásquez, PT Physical Therapist                  PT Recommendation and Plan  Anticipated Discharge Disposition (PT): home with /[facility]  Planned Therapy Interventions (PT): balance training, bed mobility training, gait training, home exercise program, motor coordination training, manual therapy techniques, neuromuscular re-education, postural re-education, patient/family education, strengthening, transfer training  Therapy Frequency (PT): 5 times/wk (5-6 times/wk)  Progress Summary (PT)  Progress Toward Functional Goals (PT): progress toward functional goals is fair  Plan of Care Reviewed With: patient  Progress: no change   Outcome Measures     Row Name 10/20/22 1500             How much help from another person do you currently need...    Turning from your back to your side while in flat bed without using bedrails? 3  -CT      Moving from lying on back to sitting on the side of a flat bed without bedrails? 2  -CT      Moving to and from a bed to a chair (including a wheelchair)? 2  -CT      Standing up from a chair using your arms (e.g., wheelchair, bedside chair)? 2  -CT      Climbing 3-5 steps with a railing? 1  -CT      To walk in hospital room? 2  -CT      AM-PAC 6 Clicks Score (PT) 12  -CT         How much help from another is currently needed...    Putting on and taking off regular lower body clothing? 1  -LA      Bathing (including washing,  rinsing, and drying) 2  -LA      Toileting (which includes using toilet bed pan or urinal) 2  -LA      Putting on and taking off regular upper body clothing 3  -LA      Taking care of personal grooming (such as brushing teeth) 3  -LA      Eating meals 3  -LA      AM-PAC 6 Clicks Score (OT) 14  -LA         Functional Assessment    Outcome Measure Options AM-PAC 6 Clicks Daily Activity (OT)  -LA            User Key  (r) = Recorded By, (t) = Taken By, (c) = Cosigned By    Initials Name Provider Type    CT Bhargavi Vásquez, PT Physical Therapist    Dianna Barreto, VELASQUEZ Occupational Therapist                 Time Calculation:    PT Charges     Row Name 10/20/22 1557 10/20/22 1556          Time Calculation    PT Non-Billable Time (min) -- 30 min  re eval  -CT     PT Received On 10/20/22  -CT 10/20/22  -CT     PT Goal Re-Cert Due Date -- 11/03/22  -CT        Time Calculation- PT    Total Timed Code Minutes- PT 39 minute(s)  PM session  -CT 33 minute(s)  AM session  -CT           User Key  (r) = Recorded By, (t) = Taken By, (c) = Cosigned By    Initials Name Provider Type    CT Bhargavi Vásquez, PT Physical Therapist              Therapy Charges for Today     Code Description Service Date Service Provider Modifiers Qty    81100906849 HC GAIT TRAINING EA 15 MIN 10/19/2022 Bhargavi Vásquez, PT GP 1    51028014874 HC PT THERAPEUTIC ACT EA 15 MIN 10/19/2022 Bhargavi Vásquez, PT GP 1    55334708315 HC PT THER PROC EA 15 MIN 10/19/2022 Bhargavi Vásquez, PT GP 1    13069201892 HC GAIT TRAINING EA 15 MIN 10/20/2022 Bhargavi Vásquez, PT GP 1    61111144794 HC PT THERAPEUTIC ACT EA 15 MIN 10/20/2022 Bhargavi Vásquez, PT GP 1    54269356865 HC GAIT TRAINING EA 15 MIN 10/20/2022 Bhargavi Vásquez, PT GP 1    61524186359 HC PT THER PROC EA 15 MIN 10/20/2022 Bhargavi Vásquez, PT GP 1    91824491123 HC PT THERAPEUTIC ACT EA 15 MIN 10/20/2022 Bhargavi Vásquez, PT GP 1    60845392571 HC PT RE-EVAL ESTABLISHED PLAN 2 10/20/2022 Bhargavi Vásquez, PT GP  1          PT G-Codes  Outcome Measure Options: AM-PAC 6 Clicks Daily Activity (OT)  AM-PAC 6 Clicks Score (PT): 12  AM-PAC 6 Clicks Score (OT): 14    Bhargavi Vásquez, PT  10/20/2022      Electronically signed by Bhargavi Vásquez, PT at 10/20/22 1601          Occupational Therapy Notes (most recent note)      Dianna Siddiqi, OT at 10/20/22 1522          Acute Care - Occupational Therapy Treatment Note  [facility]     Patient Name: Tre Kinney  : 1932  MRN: [id number]  Today's Date: 10/20/2022  Onset of Illness/Injury or Date of Surgery: 10/06/22     Referring Physician: Janneth    Admit Date: 10/5/2022     No diagnosis found.  Patient Active Problem List   Diagnosis   • Acid reflux   • Ankle arthralgia   • Cardiac conduction disorder   • Closed fracture of distal fibula   • Arteriosclerosis of coronary artery   • Fracture of distal end of tibia   • Elevated cholesterol   • BP (high blood pressure)   • Hypertrophic polyarthritis   • OP (osteoporosis)   • Right heart failure (HCC)   • Heart failure, chronic, right-sided (HCC)   • SOB (shortness of breath)   • Palpitations   • CHF (congestive heart failure) (HCC)   • Congestive heart failure (HCC)   • Precordial pain   • Chronic anticoagulation   • Hypokalemia   • Anemia, unspecified type   • Generalized weakness     Past Medical History:   Diagnosis Date   • Anemia    • Arthritis    • CHF (congestive heart failure) (HCC)    • Chronic kidney disease    • Coronary artery disease    • H/O blood clots    • History of transfusion     no reaction    • Hypertension      Past Surgical History:   Procedure Laterality Date   • ABDOMINAL SURGERY     • APPENDECTOMY     • CARDIAC SURGERY     •  SECTION     • COLONOSCOPY N/A 3/7/2018    Procedure: COLONOSCOPY;  Surgeon: Willie Gresham MD;  Location: [facility] ENDOSCOPY;  Service:    • CORONARY ANGIOPLASTY WITH STENT PLACEMENT     • ENDOSCOPY N/A 3/6/2018    Procedure: ESOPHAGOGASTRODUODENOSCOPY;  Surgeon: Willie KWAN  "MD Mp;  Location: [facility] ENDOSCOPY;  Service:    • PACEMAKER IMPLANTATION      left side    • SKIN BIOPSY     • TUBAL ABDOMINAL LIGATION           OT ASSESSMENT FLOWSHEET (last 12 hours)     OT Evaluation and Treatment     Row Name 10/20/22 1508                   OT Time and Intention    Subjective Information complains of;fatigue  -LA        Document Type therapy note (daily note)  -LA        Mode of Treatment occupational therapy  -LA        Patient Effort adequate  -LA        Symptoms Noted During/After Treatment fatigue  -LA           General Information    Patient Profile Reviewed yes  -LA        General Observations of Patient Patient seen BID this date. Patient agreeable to therapy both sessions and did get OOB to chair during both sessions this date. Patient son present during PM sesssion and provided motivation to get OOB. Patient never refuses therapy however does require motivation to complete exercises, this date patient reported \"no\" but then stated \"if it will help me get home I'll do it.\"  -LA        Existing Precautions/Restrictions fall  -LA           Pain Assessment    Pain Location - back  -LA        Pain Intervention(s) --  positioning  -LA           Cognition    Affect/Mental Status (Cognition) WFL  -LA        Orientation Status (Cognition) oriented x 3  -LA        Follows Commands (Cognition) WFL  -LA           Grooming Assessment/Training    [place] Level (Grooming) hair care, combing/brushing;standby assist;verbal cues  -LA           Bed Mobility    Supine-Sit [place] (Bed Mobility) minimum assist (75% patient effort);nonverbal cues (demo/gesture);verbal cues  -LA           Bed-Chair Transfer    Bed-Chair [place] (Transfers) moderate assist (50% patient effort);2 person assist  -LA        Assistive Device (Bed-Chair Transfers) walker, front-wheeled  -LA           Sit-Stand Transfer    Sit-Stand [place] (Transfers) moderate assist (50% patient effort)  -LA           " Motor Skills    Motor Skills coordination;functional endurance  -LA        Therapeutic Exercise shoulder;elbow/forearm;wrist;hand  -LA        Additional Documentation --  yellow theraband exercises performed 15 x2 sets this date. Cues required to maximize ROM for optimal rehab benefit.  -LA           Plan of Care Review    Plan of Care Reviewed With patient  -LA        Progress improving  -LA           Positioning and Restraints    Pre-Treatment Position in bed  -LA        Post Treatment Position chair  -LA        In Chair call light within reach;encouraged to call for assist  family present post-treatment PM visit  -LA              User Key  (r) = Recorded By, (t) = Taken By, (c) = Cosigned By    Initials Name Effective Dates    Dianna Barreto, OT 02/14/22 -                        OT Recommendation and Plan  Planned Therapy Interventions (OT): activity tolerance training, patient/caregiver education/training, adaptive equipment training, ROM/therapeutic exercise, occupation/activity based interventions, BADL retraining, strengthening exercise, transfer/mobility retraining, functional balance retraining  Therapy Frequency (OT): 6 times/wk  Plan of Care Review  Plan of Care Reviewed With: patient  Progress: improving  Outcome Evaluation: OT evaluation completed this date. Patient engaged in UE tasks with focus on trunk control and fx reaching to promote increased independence with ADLs. Patient has had a significant decline in independence and safety with ADLs resulting in need for skilled OT services. OT to address deficits to allow patient to max level of independence prior to d/c. Patient admitted for swing bed and OT discussed activities. Patient given word search puzzles to promote increase activity. patient also reports she watches television. Family also stated they could bring in books for entertainment as needed.  Plan of Care Reviewed With: patient  Outcome Evaluation: OT evaluation completed this date.  Patient engaged in UE tasks with focus on trunk control and fx reaching to promote increased independence with ADLs. Patient has had a significant decline in independence and safety with ADLs resulting in need for skilled OT services. OT to address deficits to allow patient to max level of independence prior to d/c. Patient admitted for swing bed and OT discussed activities. Patient given word search puzzles to promote increase activity. patient also reports she watches television. Family also stated they could bring in books for entertainment as needed.     Outcome Measures     Row Name 10/20/22 1500             How much help from another is currently needed...    Putting on and taking off regular lower body clothing? 1  -LA      Bathing (including washing, rinsing, and drying) 2  -LA      Toileting (which includes using toilet bed pan or urinal) 2  -LA      Putting on and taking off regular upper body clothing 3  -LA      Taking care of personal grooming (such as brushing teeth) 3  -LA      Eating meals 3  -LA      AM-PAC 6 Clicks Score (OT) 14  -LA         Functional Assessment    Outcome Measure Options AM-PAC 6 Clicks Daily Activity (OT)  -LA            User Key  (r) = Recorded By, (t) = Taken By, (c) = Cosigned By    Initials Name Provider Type    Dianna Barreto OT Occupational Therapist                Time Calculation:    Time Calculation- OT     Row Name 10/20/22 1520             Time Calculation- OT    Total Timed Code Minutes- OT 45 minute(s)  -LA            User Key  (r) = Recorded By, (t) = Taken By, (c) = Cosigned By    Initials Name Provider Type    Dianna Barreto OT Occupational Therapist              Therapy Charges for Today     Code Description Service Date Service Provider Modifiers Qty    86155958527  OT THERAPEUTIC ACT EA 15 MIN 10/20/2022 Dianna Siddiqi OT GO 1    [id number]  OT SELF CARE/MGMT/TRAIN EA 15 MIN 10/20/2022 Dianna Siddiqi OT GO 1    [id number]  OT THER PROC EA 15  MIN 10/20/2022 Dianna Siddiqi OT GO 1               Dianna Siddiqi OT  10/20/2022    Electronically signed by Dianna Siddiqi OT at 10/20/22 1523       Signed        Discharge Planning Assessment   [place]     Patient Name: Tre Kinney                    MRN: [id number]  Today's Date: 10/20/2022                   Admit Date: 10/5/2022             Discharge Plan            Row Name 10/20/22 1400             Plan      Plan SS received a call from Chana at [facility] stating she plans to verify pt's insurance benefits. SS to follow.                      Continued Care and Services - Admitted Since 10/5/2022                 Destination                 Service Provider Request Status Selected Services Address Phone Fax Patient Preferred      [facility] Pending - Request Sent N/A [address] [place] [address] [phone] --      [facility] Pending - Request Sent N/A [address] [place] [address] [phone] --      [facility] Pending - Request Sent N/A [address] [place] [address] [phone] --                       Selected Continued Care - Prior Encounters Includes continued care and service providers with selected services from prior encounters from 7/7/2022 to 10/20/2022          Discharged on 10/5/2022 Admission date: 9/19/2022 - Discharge disposition: Swing Bed                    Destination                Service Provider Selected Services Address Phone Fax Patient Preferred       [place] [facility] [address] [place] [address] [phone] -- --                          LENORE Koch                        Discharge Planning Assessment   [place]     Patient Name: Tre Kinney                    MRN: [id number]  Today's Date: 10/19/2022                   Admit Date: 10/5/2022     Plan: Pt was admitted to swing bed on 10/5/22 for therapy services. Swing bed RN states pt has been approved  through 10/21/22. Palliative Care and Dr. Lagunas had a meeting with pt's two son's on this date. Pt's family is in agreement to short-term rehab nursing home placement. SS contacted pt's sonLeno and pt's daughter in law, Chantel Rogers on this date. Pt's family voices agreement for SS to start short-term rehab at [facility]. [facility] informed SS prior to swing bed admission that they are out of network with pt's insurance. SS sent nursing home referral to [facility] and notified [facility] and notified [facility] and notified [facility].  to follow.             Discharge Plan            Row Name 10/19/22 1544             Plan      Plan Pt was admitted to swing bed on 10/5/22 for therapy services. Swing bed RN states pt has been approved through 10/21/22. Palliative Care and Dr. Lagunas had a meeting with pt's two son's on this date. Pt's family is in agreement to short-term rehab nursing home placement. SS contacted pt's sonLeno and pt's daughter in law, Chantel Rogers on this date. Pt's family voices agreement for SS to start short-term rehab at [facility]. [facility] informed SS prior to swing bed admission that they are out of network with pt's insurance. SS sent nursing home referral to [facility] and notified [facility] and notified [facility] and notified Awa SKELTON to follow.

## 2022-10-21 NOTE — THERAPY TREATMENT NOTE
Acute Care - Occupational Therapy Treatment Note   [place]     Patient Name: Tre Kinney  : 1932  MRN: [id number]  Today's Date: 10/21/2022  Onset of Illness/Injury or Date of Surgery: 10/06/22     Referring Physician: Janneth    Admit Date: 10/5/2022     No diagnosis found.  Patient Active Problem List   Diagnosis   • Acid reflux   • Ankle arthralgia   • Cardiac conduction disorder   • Closed fracture of distal fibula   • Arteriosclerosis of coronary artery   • Fracture of distal end of tibia   • Elevated cholesterol   • BP (high blood pressure)   • Hypertrophic polyarthritis   • OP (osteoporosis)   • Right heart failure (HCC)   • Heart failure, chronic, right-sided (HCC)   • SOB (shortness of breath)   • Palpitations   • CHF (congestive heart failure) (HCC)   • Congestive heart failure (HCC)   • Precordial pain   • Chronic anticoagulation   • Hypokalemia   • Anemia, unspecified type   • Generalized weakness     Past Medical History:   Diagnosis Date   • Anemia    • Arthritis    • CHF (congestive heart failure) ([facility])    • Chronic kidney disease    • Coronary artery disease    • H/O blood clots    • History of transfusion     no reaction    • Hypertension      Past Surgical History:   Procedure Laterality Date   • ABDOMINAL SURGERY     • APPENDECTOMY     • CARDIAC SURGERY     •  SECTION     • COLONOSCOPY N/A 3/7/2018    Procedure: COLONOSCOPY;  Surgeon: Willie Gresham MD;  Location:  [facility];  Service:    • CORONARY ANGIOPLASTY WITH STENT PLACEMENT     • ENDOSCOPY N/A 3/6/2018    Procedure: ESOPHAGOGASTRODUODENOSCOPY;  Surgeon: Willie Gresham MD;  Location:  [facility];  Service:    • PACEMAKER IMPLANTATION      left side    • SKIN BIOPSY     • TUBAL ABDOMINAL LIGATION           OT ASSESSMENT FLOWSHEET (last 12 hours)     OT Evaluation and Treatment     Row Name 10/21/22 1507                   OT Time and Intention    Subjective Information complains of;fatigue  -LA        Document  Type therapy note (daily note)  -LA        Mode of Treatment occupational therapy  -LA        Patient Effort good  -LA        Comment Patient continues to demonstrate progress with strength of UE's and demonstrates good particpation with minimal encouragment this date. Improved sitting tolerance noted this date and posture. Improved ease with fx transfer chair to bed this date.  -LA           General Information    Patient Profile Reviewed yes  -LA        Existing Precautions/Restrictions fall  -LA        Limitations/Impairments safety/cognitive  -LA           Cognition    Affect/Mental Status (Cognition) WNL  -LA        Orientation Status (Cognition) oriented x 4  -LA        Follows Commands (Cognition) WFL  -LA           Range of Motion Comprehensive    General Range of Motion bilateral upper extremity ROM WFL  Increased ROM/ strength noted with theraband exercises this date.  -LA           Bed Mobility    Rolling Left Bacon (Bed Mobility) minimum assist (75% patient effort)  -LA        Rolling Right Bacon (Bed Mobility) minimum assist (75% patient effort)  -LA        Scooting/Bridging Bacon (Bed Mobility) minimum assist (75% patient effort)  -LA        Sit-Supine Bacon (Bed Mobility) minimum assist (75% patient effort);moderate assist (50% patient effort)  -LA        Bed Mobility, Safety Issues decreased use of arms for pushing/pulling;decreased use of legs for bridging/pushing  -LA        Assistive Device (Bed Mobility) draw sheet;bed rails  -LA        Comment, (Bed Mobility) patient does demonstrate improvements with bed mobilty  -LA           Chair-Bed Transfer    Chair-Bed Bacon (Transfers) moderate assist (50% patient effort);2 person assist;verbal cues  max VC for sequencing of task  -LA        Assistive Device (Chair-Bed Transfers) walker, front-wheeled  -LA           Sit-Stand Transfer    Sit-Stand Bacon (Transfers) moderate assist (50% patient effort)  -LA            Motor Skills    Motor Skills coordination;functional endurance  -LA        Functional Endurance fair-  -LA        Therapeutic Exercise shoulder;elbow/forearm;wrist;hand  -LA        Additional Documentation --  Yellow theraband exercises completed 15 x2 sets. Increased strength and ROM noted.  -LA           Plan of Care Review    Plan of Care Reviewed With patient  -LA        Progress improving  -LA           Positioning and Restraints    Pre-Treatment Position sitting in chair/recliner  -LA        Post Treatment Position bed  -LA        In Bed supine;call light within reach;encouraged to call for assist;exit alarm on  -LA              User Key  (r) = Recorded By, (t) = Taken By, (c) = Cosigned By    Initials Name Effective Dates    Dianna Barreto, OT 02/14/22 -                        OT Recommendation and Plan  Planned Therapy Interventions (OT): activity tolerance training, patient/caregiver education/training, adaptive equipment training, ROM/therapeutic exercise, occupation/activity based interventions, BADL retraining, strengthening exercise, transfer/mobility retraining, functional balance retraining  Therapy Frequency (OT): 6 times/wk  Plan of Care Review  Plan of Care Reviewed With: patient  Progress: improving  Outcome Evaluation: OT evaluation completed this date. Patient engaged in UE tasks with focus on trunk control and fx reaching to promote increased independence with ADLs. Patient has had a significant decline in independence and safety with ADLs resulting in need for skilled OT services. OT to address deficits to allow patient to max level of independence prior to d/c. Patient admitted for swing bed and OT discussed activities. Patient given word search puzzles to promote increase activity. patient also reports she watches television. Family also stated they could bring in books for entertainment as needed.  Plan of Care Reviewed With: patient  Outcome Evaluation: OT evaluation completed this  date. Patient engaged in UE tasks with focus on trunk control and fx reaching to promote increased independence with ADLs. Patient has had a significant decline in independence and safety with ADLs resulting in need for skilled OT services. OT to address deficits to allow patient to max level of independence prior to d/c. Patient admitted for swing bed and OT discussed activities. Patient given word search puzzles to promote increase activity. patient also reports she watches television. Family also stated they could bring in books for entertainment as needed.     Outcome Measures     Row Name 10/21/22 1500 10/20/22 1500          How much help from another person do you currently need...    Turning from your back to your side while in flat bed without using bedrails? -- 3  -CT     Moving from lying on back to sitting on the side of a flat bed without bedrails? -- 2  -CT     Moving to and from a bed to a chair (including a wheelchair)? -- 2  -CT     Standing up from a chair using your arms (e.g., wheelchair, bedside chair)? -- 2  -CT     Climbing 3-5 steps with a railing? -- 1  -CT     To walk in hospital room? -- 2  -CT     AM-PAC 6 Clicks Score (PT) -- 12  -CT        How much help from another is currently needed...    Putting on and taking off regular lower body clothing? 1  -LA 1  -LA     Bathing (including washing, rinsing, and drying) 2  -LA 2  -LA     Toileting (which includes using toilet bed pan or urinal) 2  -LA 2  -LA     Putting on and taking off regular upper body clothing 3  -LA 3  -LA     Taking care of personal grooming (such as brushing teeth) 3  -LA 3  -LA     Eating meals 3  -LA 3  -LA     AM-PAC 6 Clicks Score (OT) 14  -LA 14  -LA        Functional Assessment    Outcome Measure Options AM-PAC 6 Clicks Daily Activity (OT)  -LA AM-PAC 6 Clicks Daily Activity (OT)  -LA           User Key  (r) = Recorded By, (t) = Taken By, (c) = Cosigned By    Initials Name Provider Type    CT Bhargavi Vásquez, PT  Physical Therapist    Dianna Barreto OT Occupational Therapist                Time Calculation:    Time Calculation- OT     Row Name 10/21/22 1516             Time Calculation- OT    Total Timed Code Minutes- OT 45 minute(s)  -LA            User Key  (r) = Recorded By, (t) = Taken By, (c) = Cosigned By    Initials Name Provider Type    Dianna Barreto OT Occupational Therapist              Therapy Charges for Today     Code Description Service Date Service Provider Modifiers Qty    92807761265 HC OT THERAPEUTIC ACT EA 15 MIN 10/20/2022 Dianna Siddiqi OT GO 1    75023970531 HC OT SELF CARE/MGMT/TRAIN EA 15 MIN 10/20/2022 Dianna Siddiqi OT GO 1    04286295662 HC OT THER PROC EA 15 MIN 10/20/2022 Dianna Siddiqi OT GO 1    73189071293 HC OT THER PROC EA 15 MIN 10/21/2022 Dianna Siddiqi OT GO 2    04534619481 HC OT THERAPEUTIC ACT EA 15 MIN 10/21/2022 Dianna Siddiqi OT GO 1               Dianna Siddiqi OT  10/21/2022

## 2022-10-21 NOTE — CASE MANAGEMENT/SOCIAL WORK
Discharge Planning Assessment  [facility]     Patient Name: Tre Kinney  MRN: [id number]  Today's Date: 10/21/2022    Admit Date: 10/5/2022         Discharge Plan     Row Name 10/21/22 1843       Plan    Plan SS received a call from Chana at [facility] stating she discussed insurance benefits with pt's son, Leno. Pt will have a $188 copay per day beginning on day 21 of skilled care due to not having a secondary payer.  Medicare sent a nontice of Medicare non-coverage to [facility]. Swing bed RNMichelet was notified. Pt's insurance has approved skilled care through 10/23/22 with expected discharge date of 10/24/22. SS notified pt's son, Leno and daughter in law, Chantel that pt's insurance has sent the Notice of Medicare Non-Coverage. SS educated pt about the Notice of Medicare Non-Coverage and she refused to sign it. SS provided pt with a copy of the Notice of Medicare Non-Coverage. SS informed pt and pt's family that the decision by pt's insurance can be appealed ([phone] or [phone]). SS to follow.              Continued Care and Services - Admitted Since 10/5/2022     Destination     Service Provider Request Status Selected Services Address Phone Fax Patient Preferred    [facility] Accepted N/A [address] [place] [address] [phone] --    [facility] Pending - Request Sent N/A [address] [place] [address] [phone] --    [facility] Declined  Does not have a secondary payor source N/A [address] [place] [address] [phone] --            Selected Continued Care - Prior Encounters Includes continued care and service providers with selected services from prior encounters from 7/7/2022 to 10/21/2022    Discharged on 10/5/2022 Admission date: 9/19/2022 - Discharge disposition: Swing Bed    Destination     Service Provider Selected Services Address Phone Fax Patient Preferred    [facility] [address]  [place] [id number] [phone] -- --                    LENORE Koch

## 2022-10-21 NOTE — PROGRESS NOTES
"Palliative Care Daily Progress Note     S: Medical record reviewed, followed up with primary RN Alysia and Dr. Lagunas. regarding patient's condition. When Palliative care entered the room the patient was resting quietly in bed. She did wake up and was alert and oriented to person place and year. She did not have any complaints, she denied pain, nausea, anxiety, or shortness of breath. She said she had eaten her biscuits and gravy today. When I asked her how she felt physical therapy was going, she said that she felt it was going better and said she has been getting up to chair to eat her meals.      O:   Palliative Performance Scale Score:     /80   Pulse 78   Temp 97.7 °F (36.5 °C) (Oral)   Resp 18   Ht 170.2 cm (67.01\")   Wt 72.7 kg (160 lb 3.2 oz)   SpO2 100%   BMI 25.08 kg/m²     Intake/Output Summary (Last 24 hours) at 10/21/2022 1606  Last data filed at 10/21/2022 1200  Gross per 24 hour   Intake 840 ml   Output 850 ml   Net -10 ml       PE:  General Appearance:    Chronically ill appearing, alert, cooperative, no acute distress   HEENT:    NC/AT, without obvious abnormality, EOMI, anicteric, LIBORIO    Neck:   supple, trachea midline, no JVD   Lungs:     Regular unlabored respirations, clear to auscultation    Heart:    Regular rate and rhythm, normal S1 and S2, no M/R/G   Abdomen:     Soft, NT, ND, NABS    Extremities:   Moves all extremities weakly, however improved, no edema   Pulses:   Pulses palpable and equal bilaterally   Skin:   Warm, dry, fragile   Neurologic:   A/Ox3, cooperative   Psych:   Calm, appropriate                                                                          Meds: Reviewed and changes noted.    Labs:   Results from last 7 days   Lab Units 10/21/22  0158   WBC 10*3/mm3 7.43   HEMOGLOBIN g/dL 9.6*   HEMATOCRIT % 29.1*   PLATELETS 10*3/mm3 198     Results from last 7 days   Lab Units 10/21/22  0158   SODIUM mmol/L 137   POTASSIUM mmol/L 3.3*   CHLORIDE mmol/L 99   CO2 " mmol/L 26.0   BUN mg/dL 32*   CREATININE mg/dL 1.98*   GLUCOSE mg/dL 107*   CALCIUM mg/dL 8.8     Results from last 7 days   Lab Units 10/21/22  0158   SODIUM mmol/L 137   POTASSIUM mmol/L 3.3*   CHLORIDE mmol/L 99   CO2 mmol/L 26.0   BUN mg/dL 32*   CREATININE mg/dL 1.98*   CALCIUM mg/dL 8.8   BILIRUBIN mg/dL 0.5   ALK PHOS U/L 47   ALT (SGPT) U/L 5   AST (SGOT) U/L 11   GLUCOSE mg/dL 107*     Imaging Results (Last 72 Hours)     ** No results found for the last 72 hours. **            Diagnostics: Reviewed    A: Tre Kinney is a 89 y.o.  female admitted on 10/5/2022 to [facility] for physical therapy for ten days. She was originally admitted to the hospital on 9/19/2022 due to a fall and symptomatic anemia. She has a medical history CHF, chronic kidney disease, coronary artery disease s/p stenting, history of blood clots, chronic anticoagulation with Coumadin, SSS s/p pacemaker placement, chronic diastolic CHF, and iron deficiency anemia who presents today with a chief complaint of a fall. Per pt she has become progressively more weak and has experienced several falls. Pt has sustained a moderate acute superior endplate vertebral compression fracture at the L1 with about 40% loss of vertebral body height and mild posterior osseous retropulsion of about 6 mm and also noted to have chronic compression fracture deformities involving the inferior endplates of L3 and L4 due to her first fall and from her second fall she does have a large hematoma on her right side, which led her to this admission. Pt had a hemoglobin of 6.1 for which she received 2 units of PRBC's. Pt denies any known source of bleeding and does say she had a scope(EGD colonoscopy at [facility] in 2018.    Today 10/21/2022  T 97.7, HR 78, RR 18, BP of 125/80, and was saturating 100% on room air and was in no distress.    P:  I was able to touch base with patients son Leno and his wife to update, provide support. I discussed with them that Tre has been doing  better with therapy the last couple of days and I was hopeful this is a step in the right direction for her getting strong enough to go home. Both Leno and his wife had strong concerns regarding how much therapy she has been actually getting before Thursday. I apologized to them and said unfortunately I would have no way of knowing how much therapy was going on as I was not present, I too can only go by the notes. I let them know that I will continue to support and motivate Tre in whatever way I can. Plan is unclear at this time, Erin in  is working with family on D/C plan, insurance and potential rehab facilities.      We will continue to follow along. Please do not hesitate to contact us regarding further sx mgmt or GOC needs, including after hours or on weekends via our on call provider at [phone].     Liyah Jaquez, APRN    10/21/2022

## 2022-10-21 NOTE — NURSING NOTE
Attempted to get PT up to ambulate to recliner and sit before breakfast came. Pt refused stating that she did not want to get out of bed until right before breakfast came.

## 2022-10-21 NOTE — NURSING NOTE
Attempted to get pt out of bed to ambulate at a minimum of standing at bedside. PT refused stating she felt tired from getting up and moving that day. Will attempt to ambulate patient towards the end of shift.

## 2022-10-21 NOTE — CASE MANAGEMENT/SOCIAL WORK
Clinical updates sent to [facility] this AM for continued stay review . They are pending clinical review provider and SS notified via secure chat.

## 2022-10-22 LAB — POTASSIUM SERPL-SCNC: 4.7 MMOL/L (ref 3.5–5.2)

## 2022-10-22 PROCEDURE — 25010000002 ENOXAPARIN PER 10 MG: Performed by: INTERNAL MEDICINE

## 2022-10-22 PROCEDURE — 97530 THERAPEUTIC ACTIVITIES: CPT

## 2022-10-22 PROCEDURE — 84132 ASSAY OF SERUM POTASSIUM: CPT | Performed by: INTERNAL MEDICINE

## 2022-10-22 PROCEDURE — 97110 THERAPEUTIC EXERCISES: CPT | Performed by: OCCUPATIONAL THERAPIST

## 2022-10-22 PROCEDURE — 97110 THERAPEUTIC EXERCISES: CPT

## 2022-10-22 PROCEDURE — 97530 THERAPEUTIC ACTIVITIES: CPT | Performed by: OCCUPATIONAL THERAPIST

## 2022-10-22 RX ADMIN — Medication 1 TABLET: at 08:28

## 2022-10-22 RX ADMIN — FUROSEMIDE 40 MG: 40 TABLET ORAL at 08:28

## 2022-10-22 RX ADMIN — Medication 10 ML: at 08:29

## 2022-10-22 RX ADMIN — Medication 10 ML: at 21:15

## 2022-10-22 RX ADMIN — ESCITALOPRAM 10 MG: 10 TABLET, FILM COATED ORAL at 08:28

## 2022-10-22 RX ADMIN — HYDRALAZINE HYDROCHLORIDE 10 MG: 10 TABLET, FILM COATED ORAL at 21:14

## 2022-10-22 RX ADMIN — GABAPENTIN 300 MG: 300 CAPSULE ORAL at 21:15

## 2022-10-22 RX ADMIN — Medication 10 MG: at 21:13

## 2022-10-22 RX ADMIN — Medication 10 ML: at 08:28

## 2022-10-22 RX ADMIN — HYDRALAZINE HYDROCHLORIDE 10 MG: 10 TABLET, FILM COATED ORAL at 05:16

## 2022-10-22 RX ADMIN — ENOXAPARIN SODIUM 70 MG: 80 INJECTION SUBCUTANEOUS at 21:13

## 2022-10-22 RX ADMIN — METOPROLOL TARTRATE 12.5 MG: 25 TABLET, FILM COATED ORAL at 21:13

## 2022-10-22 RX ADMIN — METOPROLOL TARTRATE 12.5 MG: 25 TABLET, FILM COATED ORAL at 08:28

## 2022-10-22 RX ADMIN — FUROSEMIDE 40 MG: 40 TABLET ORAL at 17:09

## 2022-10-22 RX ADMIN — PANTOPRAZOLE SODIUM 40 MG: 40 TABLET, DELAYED RELEASE ORAL at 05:16

## 2022-10-22 RX ADMIN — TAMSULOSIN HYDROCHLORIDE 0.4 MG: 0.4 CAPSULE ORAL at 08:28

## 2022-10-22 RX ADMIN — DOCUSATE SODIUM 100 MG: 100 CAPSULE ORAL at 21:14

## 2022-10-22 NOTE — NURSING NOTE
Pt ambulated back to bed with 2 person assist. Pt had greater difficulty rising from a seated position requiring multiple attempts. PT tolerated being up in the chair well. Only c/o at this time was being tired. Will continue to monitor for s/sx of distress.

## 2022-10-22 NOTE — THERAPY TREATMENT NOTE
Acute Care - Occupational Therapy Treatment Note   [facility]     Patient Name: Tre Kinney  : 1932  MRN: [id number]  Today's Date: 10/22/2022  Onset of Illness/Injury or Date of Surgery: 10/06/22     Referring Physician: Janneth    Admit Date: 10/5/2022     No diagnosis found.  Patient Active Problem List   Diagnosis   • Acid reflux   • Ankle arthralgia   • Cardiac conduction disorder   • Closed fracture of distal fibula   • Arteriosclerosis of coronary artery   • Fracture of distal end of tibia   • Elevated cholesterol   • BP (high blood pressure)   • Hypertrophic polyarthritis   • OP (osteoporosis)   • Right heart failure (HCC)   • Heart failure, chronic, right-sided (HCC)   • SOB (shortness of breath)   • Palpitations   • CHF (congestive heart failure) (HCC)   • Congestive heart failure (HCC)   • Precordial pain   • Chronic anticoagulation   • Hypokalemia   • Anemia, unspecified type   • Generalized weakness     Past Medical History:   Diagnosis Date   • Anemia    • Arthritis    • CHF (congestive heart failure) ([facility])    • Chronic kidney disease    • Coronary artery disease    • H/O blood clots    • History of transfusion     no reaction    • Hypertension      Past Surgical History:   Procedure Laterality Date   • ABDOMINAL SURGERY     • APPENDECTOMY     • CARDIAC SURGERY     •  SECTION     • COLONOSCOPY N/A 3/7/2018    Procedure: COLONOSCOPY;  Surgeon: Willie Gresham MD;  Location:  [facility];  Service:    • CORONARY ANGIOPLASTY WITH STENT PLACEMENT     • ENDOSCOPY N/A 3/6/2018    Procedure: ESOPHAGOGASTRODUODENOSCOPY;  Surgeon: Willie Gresham MD;  Location:  [facility];  Service:    • PACEMAKER IMPLANTATION      left side    • SKIN BIOPSY     • TUBAL ABDOMINAL LIGATION           OT ASSESSMENT FLOWSHEET (last 12 hours)     OT Evaluation and Treatment     Row Name 10/22/22 1303                   OT Time and Intention    Subjective Information no complaints  -BF        Document Type  therapy note (daily note)  -BF        Mode of Treatment occupational therapy  -BF        Patient Effort good  -BF           General Information    Patient/Family/Caregiver Comments/Observations Pt sitting in chair, agreeable to OT.  -BF        Existing Precautions/Restrictions fall;other (see comments)  swan catheter  -BF           Cognition    Orientation Status (Cognition) oriented x 3  -BF        Follows Commands (Cognition) WFL  -BF           Lower Body Dressing Assessment/Training    [place] Level (Lower Body Dressing) dependent (less than 25% patient effort)  -BF        Position (Lower Body Dressing) supported sitting  -BF           Motor Skills    Motor Skills motor control/coordination interventions  -BF        Motor Control/Coordination Interventions gross motor coordination activities;therapeutic exercise/ROM  BUE GMC therex, strengthening w/ theraband of min/mod resistance  -BF           Positioning and Restraints    In Chair sitting;call light within reach;encouraged to call for assist  -BF              User Key  (r) = Recorded By, (t) = Taken By, (c) = Cosigned By    Initials Name Effective Dates    BF Rosa Aquino, OT 06/16/21 -                        OT Recommendation and Plan           Outcome Measures     Row Name 10/22/22 1300 10/21/22 1500 10/20/22 1500       How much help from another person do you currently need...    Turning from your back to your side while in flat bed without using bedrails? -- -- 3  -CT    Moving from lying on back to sitting on the side of a flat bed without bedrails? -- -- 2  -CT    Moving to and from a bed to a chair (including a wheelchair)? -- -- 2  -CT    Standing up from a chair using your arms (e.g., wheelchair, bedside chair)? -- -- 2  -CT    Climbing 3-5 steps with a railing? -- -- 1  -CT    To walk in hospital room? -- -- 2  -CT    AM-PAC 6 Clicks Score (PT) -- -- 12  -CT       How much help from another is currently needed...    Putting on and  taking off regular lower body clothing? 1  -BF 1  -LA 1  -LA    Bathing (including washing, rinsing, and drying) 2  -BF 2  -LA 2  -LA    Toileting (which includes using toilet bed pan or urinal) 2  -BF 2  -LA 2  -LA    Putting on and taking off regular upper body clothing 3  -BF 3  -LA 3  -LA    Taking care of personal grooming (such as brushing teeth) 3  -BF 3  -LA 3  -LA    Eating meals 3  -BF 3  -LA 3  -LA    AM-PAC 6 Clicks Score (OT) 14  -BF 14  -LA 14  -LA       Functional Assessment    Outcome Measure Options -- AM-PAC 6 Clicks Daily Activity (OT)  -LA AM-PAC 6 Clicks Daily Activity (OT)  -LA          User Key  (r) = Recorded By, (t) = Taken By, (c) = Cosigned By    Initials Name Provider Type    Rosa Khoury OT Occupational Therapist    Bhargavi Woody, PT Physical Therapist    Dianna Barreto OT Occupational Therapist                Time Calculation:    Time Calculation- OT     Row Name 10/22/22 1162             Time Calculation- OT    Total Timed Code Minutes- OT 25 minute(s)  -BF            User Key  (r) = Recorded By, (t) = Taken By, (c) = Cosigned By    Initials Name Provider Type    Rosa Khoury OT Occupational Therapist              Therapy Charges for Today     Code Description Service Date Service Provider Modifiers Qty    10638619846  OT THERAPEUTIC ACT EA 15 MIN 10/22/2022 Rosa Aquino OT GO 1    [id number]  OT THER PROC EA 15 MIN 10/22/2022 Rosa Aquino OT GO 1               Rosa Aquino OT  10/22/2022

## 2022-10-22 NOTE — THERAPY TREATMENT NOTE
Acute Care - Physical Therapy Treatment Note   [place]     Patient Name: Tre Kinney  : 1932  MRN: [id number]  Today's Date: 10/22/2022   Onset of Illness/Injury or Date of Surgery: 10/06/22  Visit Dx:   No diagnosis found.  Patient Active Problem List   Diagnosis   • Acid reflux   • Ankle arthralgia   • Cardiac conduction disorder   • Closed fracture of distal fibula   • Arteriosclerosis of coronary artery   • Fracture of distal end of tibia   • Elevated cholesterol   • BP (high blood pressure)   • Hypertrophic polyarthritis   • OP (osteoporosis)   • Right heart failure (HCC)   • Heart failure, chronic, right-sided (HCC)   • SOB (shortness of breath)   • Palpitations   • CHF (congestive heart failure) (HCC)   • Congestive heart failure (HCC)   • Precordial pain   • Chronic anticoagulation   • Hypokalemia   • Anemia, unspecified type   • Generalized weakness     Past Medical History:   Diagnosis Date   • Anemia    • Arthritis    • CHF (congestive heart failure) (HCC)    • Chronic kidney disease    • Coronary artery disease    • H/O blood clots    • History of transfusion     no reaction    • Hypertension      Past Surgical History:   Procedure Laterality Date   • ABDOMINAL SURGERY     • APPENDECTOMY     • CARDIAC SURGERY     •  SECTION     • COLONOSCOPY N/A 3/7/2018    Procedure: COLONOSCOPY;  Surgeon: Willie Gresham MD;  Location:  [facility];  Service:    • CORONARY ANGIOPLASTY WITH STENT PLACEMENT     • ENDOSCOPY N/A 3/6/2018    Procedure: ESOPHAGOGASTRODUODENOSCOPY;  Surgeon: Willie Gresham MD;  Location:  [facility];  Service:    • PACEMAKER IMPLANTATION      left side    • SKIN BIOPSY     • TUBAL ABDOMINAL LIGATION       PT Assessment (last 12 hours)     PT Evaluation and Treatment     Row Name 10/22/22 1400          Physical Therapy Time and Intention    Document Type therapy note (daily note)  -     Mode of Treatment physical therapy  -     Patient Effort good  -KP      Comment Pt was pleasant and cooperative.  -     Row Name 10/22/22 1400          General Information    Patient Profile Reviewed yes  -     Patient Observations cooperative;alert;agree to therapy  -     Patient/Family/Caregiver Comments/Observations Patient had just laid back in bed prior to therapist arrival  -     Existing Precautions/Restrictions fall;other (see comments)  swan  -     Row Name 10/22/22 1400          Pain    Pretreatment Pain Rating 0/10 - no pain  -     Posttreatment Pain Rating 0/10 - no pain  -     Row Name 10/22/22 1400          Cognition    Orientation Status (Cognition) oriented x 3  -     Follows Commands (Cognition) WFL  -     Personal Safety Interventions fall prevention program maintained;gait belt;muscle strengthening facilitated;nonskid shoes/slippers when out of bed;supervised activity  -     Row Name 10/22/22 1400          Bed Mobility    Rolling Left [place] (Bed Mobility) moderate assist (50% patient effort);1 person assist;minimum assist (75% patient effort);verbal cues  [facility]     Scooting/Bridging [place] (Bed Mobility) minimum assist (75% patient effort);1 person assist;verbal cues;other (see comments);moderate assist (50% patient effort)  to scoot to EOB.  MaxAx2 to scoot up higher in bed  -     Supine-Sit [place] (Bed Mobility) moderate assist (50% patient effort);1 person assist;verbal cues;2 person assist  -     Sit-Supine [place] (Bed Mobility) moderate assist (50% patient effort);2 person assist;verbal cues  [facility]     Bed Mobility, Safety Issues decreased use of legs for bridging/pushing;decreased use of arms for pushing/pulling  log rolling for safety  -     Assistive Device (Bed Mobility) bed rails;draw sheet  -     Row Name 10/22/22 1400          Sit-Stand Transfer    Sit-Stand [place] (Transfers) minimum assist (75% patient effort);moderate assist (50% patient effort);1 person assist;2 person assist;verbal cues  [facility]      Assistive Device (Sit-Stand Transfers) walker, front-wheeled  -     Comment, (Sit-Stand Transfer) 4x  -     Row Name 10/22/22 1400          Stand-Sit Transfer    Stand-Sit [place] (Transfers) minimum assist (75% patient effort);1 person assist;verbal cues;2 person assist  -     Assistive Device (Stand-Sit Transfers) walker, front-wheeled  -KP     Comment, (Stand-Sit Transfer) 4x  -     Row Name 10/22/22 1400          Gait/Stairs (Locomotion)    Comment, (Gait/Stairs) Pt did not ambulate except for side steps towards HOB today.  Emphasis was on improved sit to stand transfers and improving posture, standing strength and balance to decrease fall risk during transfer training.  -     Row Name 10/22/22 1400          Balance    Static Sitting Balance standby assist  -     Dynamic Sitting Balance contact guard  -     Position, Sitting Balance sitting edge of bed  -     Static Standing Balance minimal assist;1-person assist;2-person assist  -     Position/Device Used, Standing Balance walker, front-wheeled  -     Comment, Balance Verbal cues and tactile cues needed for proper weight shifting.  Cues for extension through elbows, knees and hips during static standing balance.  Improving MIGUELINA.  -     Row Name 10/22/22 1400          Hip (Therapeutic Exercise)    Hip (Therapeutic Exercise) strengthening exercise  -     Hip Strengthening (Therapeutic Exercise) left;right;aBduction;aDduction;supine;10 repetitions;3 sets;other (see comments)  Isometric gluteal squeezes in hooklying with and without adduction  -     Row Name 10/22/22 1400          Knee (Therapeutic Exercise)    Knee (Therapeutic Exercise) strengthening exercise  -     Knee Strengthening (Therapeutic Exercise) left;right;extension;SAQ (short arc quad);supine;10 repetitions;3 sets;other (see comments)  Emphasis on terminal knee extension to improve standing posture  -     Row Name 10/22/22 1400          Plan of Care Review    Plan of  Care Reviewed With patient  -     Outcome Evaluation PT treatment complete.  Pt participate in sit to stand transfer training, static and dynamic standing balance with emphasis on improving posture, trunk extension, hip extension and terminal knee extension in order to reduce fall risk during transfer training and improve strength and endurance.  Supine therapeutic exercises completed with emphasis on hip, gluteal and terminal knee extension strength.  Continue PT POC.  -KP     Row Name 10/22/22 1400          Positioning and Restraints    Pre-Treatment Position in bed  -     Post Treatment Position bed  -KP     In Bed notified nsg;side lying left;call light within reach;encouraged to call for assist;side rails up x3;heels elevated  Lines in tact and needs in reach  -KP           User Key  (r) = Recorded By, (t) = Taken By, (c) = Cosigned By    Initials Name Provider Type    Martina Davis, PT Physical Therapist                  PT Recommendation and Plan     Plan of Care Reviewed With: patient  Outcome Evaluation: PT treatment complete.  Pt participate in sit to stand transfer training, static and dynamic standing balance with emphasis on improving posture, trunk extension, hip extension and terminal knee extension in order to reduce fall risk during transfer training and improve strength and endurance.  Supine therapeutic exercises completed with emphasis on hip, gluteal and terminal knee extension strength.  Continue PT POC.   Outcome Measures     Row Name 10/22/22 1300 10/21/22 1500 10/20/22 1500       How much help from another person do you currently need...    Turning from your back to your side while in flat bed without using bedrails? -- -- 3  -CT    Moving from lying on back to sitting on the side of a flat bed without bedrails? -- -- 2  -CT    Moving to and from a bed to a chair (including a wheelchair)? -- -- 2  -CT    Standing up from a chair using your arms (e.g., wheelchair, bedside chair)? --  -- 2  -CT    Climbing 3-5 steps with a railing? -- -- 1  -CT    To walk in hospital room? -- -- 2  -CT    AM-PAC 6 Clicks Score (PT) -- -- 12  -CT       How much help from another is currently needed...    Putting on and taking off regular lower body clothing? 1  -BF 1  -LA 1  -LA    Bathing (including washing, rinsing, and drying) 2  -BF 2  -LA 2  -LA    Toileting (which includes using toilet bed pan or urinal) 2  -BF 2  -LA 2  -LA    Putting on and taking off regular upper body clothing 3  -BF 3  -LA 3  -LA    Taking care of personal grooming (such as brushing teeth) 3  -BF 3  -LA 3  -LA    Eating meals 3  -BF 3  -LA 3  -LA    AM-PAC 6 Clicks Score (OT) 14  -BF 14  -LA 14  -LA       Functional Assessment    Outcome Measure Options -- AM-PAC 6 Clicks Daily Activity (OT)  -LA AM-PAC 6 Clicks Daily Activity (OT)  -LA          User Key  (r) = Recorded By, (t) = Taken By, (c) = Cosigned By    Initials Name Provider Type    Rosa Khoury, OT Occupational Therapist    CT Bhargavi Vásquez, PT Physical Therapist    Dianna Barreto, OT Occupational Therapist                 Time Calculation:    PT Charges     Row Name 10/22/22 1424             Time Calculation    PT Received On 10/22/22  -      PT Goal Re-Cert Due Date 11/03/22  -         Timed Charges    84220 - PT Therapeutic Exercise Minutes 15  -KP      13249 - PT Therapeutic Activity Minutes 15  -KP         Total Minutes    Timed Charges Total Minutes 30  -KP       Total Minutes 30  -KP            User Key  (r) = Recorded By, (t) = Taken By, (c) = Cosigned By    Initials Name Provider Type     Martina Blum PT Physical Therapist              Therapy Charges for Today     Code Description Service Date Service Provider Modifiers Qty    75450997541 HC PT THERAPEUTIC ACT EA 15 MIN 10/22/2022 Martina Blum, PT GP 1    05226591649 HC PT THER PROC EA 15 MIN 10/22/2022 Martina Blum, PT GP 1          PT G-Codes  Outcome Measure Options: AM-PAC 6 Clicks  Daily Activity (OT)  AM-PAC 6 Clicks Score (PT): 12  AM-PAC 6 Clicks Score (OT): 14    Martina Blum, PT  10/22/2022

## 2022-10-22 NOTE — NURSING NOTE
Pt ambulated in room to recliner with assistance from 2 personnel and standard walker. Gait belt was used, minimal assistance required once standing. PT did express difficulty with rising out of bed into standing position.

## 2022-10-22 NOTE — PLAN OF CARE
Goal Outcome Evaluation:               PT ambulated to recliner in room with 2 person assist and walker. Pt expressed difficulty with rising from seated to standing position. Slight assistance was needed for stabilization while ambulating. Pt sat in chair for approx 2.5 hours without c/o. PT has since rested well in bed, no c/o or s/sx of distress, VSS, will con't to monitor.

## 2022-10-22 NOTE — PLAN OF CARE
Goal Outcome Evaluation:  Plan of Care Reviewed With: patient        Progress: no change  Outcome Evaluation: Pt's resting in bed, nice and pleasant, no c/o, no s/s of distress noted. Was up in chair and sat up, worked with PT/OT, also family did some leg exercises ( stated 40 kicks) with pt while visiting. Pt was very happy with corn bread and soup beans for supper and ate most of it. Con't wit POC.

## 2022-10-23 LAB
ANION GAP SERPL CALCULATED.3IONS-SCNC: 12.8 MMOL/L (ref 5–15)
BUN SERPL-MCNC: 28 MG/DL (ref 8–23)
BUN/CREAT SERPL: 14.4 (ref 7–25)
CALCIUM SPEC-SCNC: 8.7 MG/DL (ref 8.6–10.5)
CHLORIDE SERPL-SCNC: 101 MMOL/L (ref 98–107)
CO2 SERPL-SCNC: 25.2 MMOL/L (ref 22–29)
CREAT SERPL-MCNC: 1.94 MG/DL (ref 0.57–1)
DEPRECATED RDW RBC AUTO: 61 FL (ref 37–54)
EGFRCR SERPLBLD CKD-EPI 2021: 24.4 ML/MIN/1.73
ERYTHROCYTE [DISTWIDTH] IN BLOOD BY AUTOMATED COUNT: 17.3 % (ref 12.3–15.4)
GLUCOSE SERPL-MCNC: 99 MG/DL (ref 65–99)
HCT VFR BLD AUTO: 28.9 % (ref 34–46.6)
HGB BLD-MCNC: 9.4 G/DL (ref 12–15.9)
MAGNESIUM SERPL-MCNC: 2.4 MG/DL (ref 1.6–2.4)
MCH RBC QN AUTO: 31.1 PG (ref 26.6–33)
MCHC RBC AUTO-ENTMCNC: 32.5 G/DL (ref 31.5–35.7)
MCV RBC AUTO: 95.7 FL (ref 79–97)
PHOSPHATE SERPL-MCNC: 3.5 MG/DL (ref 2.5–4.5)
PLATELET # BLD AUTO: 233 10*3/MM3 (ref 140–450)
PMV BLD AUTO: 9.9 FL (ref 6–12)
POTASSIUM SERPL-SCNC: 3.9 MMOL/L (ref 3.5–5.2)
RBC # BLD AUTO: 3.02 10*6/MM3 (ref 3.77–5.28)
SODIUM SERPL-SCNC: 139 MMOL/L (ref 136–145)
WBC NRBC COR # BLD: 7.27 10*3/MM3 (ref 3.4–10.8)

## 2022-10-23 PROCEDURE — 80048 BASIC METABOLIC PNL TOTAL CA: CPT | Performed by: INTERNAL MEDICINE

## 2022-10-23 PROCEDURE — 25010000002 ENOXAPARIN PER 10 MG: Performed by: INTERNAL MEDICINE

## 2022-10-23 PROCEDURE — 85027 COMPLETE CBC AUTOMATED: CPT | Performed by: INTERNAL MEDICINE

## 2022-10-23 PROCEDURE — 83735 ASSAY OF MAGNESIUM: CPT | Performed by: INTERNAL MEDICINE

## 2022-10-23 PROCEDURE — 84100 ASSAY OF PHOSPHORUS: CPT | Performed by: INTERNAL MEDICINE

## 2022-10-23 RX ORDER — HYDRALAZINE HYDROCHLORIDE 10 MG/1
5 TABLET, FILM COATED ORAL EVERY 8 HOURS SCHEDULED
Status: DISCONTINUED | OUTPATIENT
Start: 2022-10-23 | End: 2022-12-31

## 2022-10-23 RX ADMIN — GABAPENTIN 300 MG: 300 CAPSULE ORAL at 21:41

## 2022-10-23 RX ADMIN — METOPROLOL TARTRATE 12.5 MG: 25 TABLET, FILM COATED ORAL at 21:42

## 2022-10-23 RX ADMIN — Medication 10 ML: at 09:16

## 2022-10-23 RX ADMIN — Medication 10 MG: at 21:42

## 2022-10-23 RX ADMIN — PANTOPRAZOLE SODIUM 40 MG: 40 TABLET, DELAYED RELEASE ORAL at 05:38

## 2022-10-23 RX ADMIN — HYDRALAZINE HYDROCHLORIDE 10 MG: 10 TABLET, FILM COATED ORAL at 14:15

## 2022-10-23 RX ADMIN — HYDRALAZINE HYDROCHLORIDE 5 MG: 25 TABLET, FILM COATED ORAL at 21:42

## 2022-10-23 RX ADMIN — Medication 1 TABLET: at 09:13

## 2022-10-23 RX ADMIN — METOPROLOL TARTRATE 12.5 MG: 25 TABLET, FILM COATED ORAL at 09:13

## 2022-10-23 RX ADMIN — FUROSEMIDE 40 MG: 40 TABLET ORAL at 17:08

## 2022-10-23 RX ADMIN — FUROSEMIDE 40 MG: 40 TABLET ORAL at 09:14

## 2022-10-23 RX ADMIN — DOCUSATE SODIUM 100 MG: 100 CAPSULE ORAL at 09:13

## 2022-10-23 RX ADMIN — DOCUSATE SODIUM 100 MG: 100 CAPSULE ORAL at 21:41

## 2022-10-23 RX ADMIN — TAMSULOSIN HYDROCHLORIDE 0.4 MG: 0.4 CAPSULE ORAL at 09:14

## 2022-10-23 RX ADMIN — ESCITALOPRAM 10 MG: 10 TABLET, FILM COATED ORAL at 09:13

## 2022-10-23 RX ADMIN — ENOXAPARIN SODIUM 70 MG: 80 INJECTION SUBCUTANEOUS at 21:42

## 2022-10-23 RX ADMIN — Medication 10 ML: at 21:43

## 2022-10-23 RX ADMIN — HYDRALAZINE HYDROCHLORIDE 10 MG: 10 TABLET, FILM COATED ORAL at 05:38

## 2022-10-23 RX ADMIN — Medication 10 ML: at 21:44

## 2022-10-23 NOTE — PLAN OF CARE
Goal Outcome Evaluation:  Plan of Care Reviewed With: patient        Progress: no change  Outcome Evaluation: pt has rested in bed, pt has ambulated to bedside chair for breakfast and lunch with 3 assist and walker, pt tolerated well. no acute changes in pt, will continue plan of care

## 2022-10-23 NOTE — PROGRESS NOTES
[facility]    Patient Identification:  Name:  Tre Kinney  Age:  89 y.o.  Sex:  female  :  1932  MRN:  [id number]  Visit number:  [id number]  Primary Care Provider:  Jessica Pedro APRN    Length of stay in inpatient status:  18    Brief Update     When I saw the patient today, she was sitting up in a chair.  She states that she is feeling well with no pain anywhere.  I asked her to lift her feet up and down by extending her knees; she at first was reluctant but she eventually did do this and the motion looks better than a few days ago.  On exam, she has unremarkable breath sounds.  She does not have a heart murmur; the rest of her cardiac sounds are unremarkable.  She did not have any pedal edema.  I have reviewed her vital signs and the labs that were drawn this morning.  The patient has not received some doses of the hydralazine 10 mg in the last couple of days as her blood pressures have not been in the range to receive this medication.  Therefore, I will cut the hydralazine down to 5 mg by mouth 3 times a day and we will continue to monitor her blood pressures closely.   returns to service tomorrow; a new hospitalist will be assigned to the patient in the morning.  Since her labs and vitals have been stable and, unless she has a change in status, I do not plan on repeating labs for couple of days so as to decrease her chances of developing subacute blood loss anemia from frequent blood draws.    LABS:    CBC and coagulation:  Results from last 7 days   Lab Units 10/23/22  0149 10/21/22  0158 10/17/22  0507   WBC 10*3/mm3 7.27 7.43 7.35   HEMOGLOBIN g/dL 9.4* 9.6* 9.8*   HEMATOCRIT % 28.9* 29.1* 30.9*   MCV fL 95.7 95.1 96.6   MCHC g/dL 32.5 33.0 31.7   PLATELETS 10*3/mm3 233 198 197     Renal and electrolytes:  Results from last 7 days   Lab Units 10/23/22  0149 10/22/22  0113 10/21/22  0158 10/17/22  0507   SODIUM mmol/L 139  --  137 136    POTASSIUM mmol/L 3.9 4.7 3.3* 3.8   MAGNESIUM mg/dL 2.4  --  1.9  --    CHLORIDE mmol/L 101  --  99 98   CO2 mmol/L 25.2  --  26.0 27.7   BUN mg/dL 28*  --  32* 34*   CREATININE mg/dL 1.94*  --  1.98* 1.98*   CALCIUM mg/dL 8.7  --  8.8 8.9   PHOSPHORUS mg/dL 3.5  --  3.7  --    GLUCOSE mg/dL 99  --  107* 108*     Estimated Creatinine Clearance: 22.8 mL/min (A) (by C-G formula based on SCr of 1.94 mg/dL (H)).      Jannet Lagunas MD  [facility]  10/23/22  09:47 EDT

## 2022-10-23 NOTE — PLAN OF CARE
Goal Outcome Evaluation:               Pt has rested well this shift. Pt attempted to ambulate with assistance but stated she was too fatigued from working with PT and staff on day shift. VSS, no c/o or s/sx of discomfort this shift. Will continue to monitor for changes.

## 2022-10-23 NOTE — PROGRESS NOTES
[facility] HOSPITALISTS CROSS COVER NOTE    Patient Identification:  Name:  Tre Kinney  Age:  89 y.o.  Sex:  female  :  1932  MRN:  [id number]  Visit number:  [id number]  Primary Care Provider:  Jessica Pedro APRN    Length of stay in inpatient status:  17    Brief Update     I evaluated the patient when physical therapist Martina was in the room.  The patient was doing some therapeutic exercises while laying in the bed.  The patient told me that she had been trying to stand up with the walker and that she is not able to do it on her own yet.  However, she thinks that she is getting stronger as it is easier to perform the exercises that she is performing in bed.  The patient again denies pain anywhere, in particular she denies chest pain and trouble breathing.  I will repeat blood work in the morning to see how the electrolytes are trending.  Please note that her blood pressures are at goal and stable at this time.    Jannet Lagunas MD  [facility] Hospitalist  10/22/22  20:19 EDT

## 2022-10-23 NOTE — NURSING NOTE
Attempted to ambulate pt to recliner in room. PT was unsteady and felt unsafe so returned her to bed. PT was able to stand at bedside for a couple minutes with assistance from staff to keep her upright. Will attempt to ambulate again before end of shift.

## 2022-10-23 NOTE — NURSING NOTE
Pt ambulated to bedside chair with 3 assist and a walker for breakfast and lunch, pt tolerated well, pt was placed back in bed and has rested well, asked pt if she would get up for dinner and pt said she didn't feel like it right now and wanted to eat in bed.

## 2022-10-24 PROCEDURE — 99308 SBSQ NF CARE LOW MDM 20: CPT | Performed by: INTERNAL MEDICINE

## 2022-10-24 PROCEDURE — 97110 THERAPEUTIC EXERCISES: CPT

## 2022-10-24 PROCEDURE — 97530 THERAPEUTIC ACTIVITIES: CPT

## 2022-10-24 PROCEDURE — 25010000002 ENOXAPARIN PER 10 MG: Performed by: INTERNAL MEDICINE

## 2022-10-24 PROCEDURE — 97116 GAIT TRAINING THERAPY: CPT

## 2022-10-24 RX ADMIN — Medication 10 MG: at 20:06

## 2022-10-24 RX ADMIN — PANTOPRAZOLE SODIUM 40 MG: 40 TABLET, DELAYED RELEASE ORAL at 05:13

## 2022-10-24 RX ADMIN — FUROSEMIDE 40 MG: 40 TABLET ORAL at 08:16

## 2022-10-24 RX ADMIN — ESCITALOPRAM 10 MG: 10 TABLET, FILM COATED ORAL at 08:16

## 2022-10-24 RX ADMIN — HYDRALAZINE HYDROCHLORIDE 5 MG: 25 TABLET, FILM COATED ORAL at 21:06

## 2022-10-24 RX ADMIN — GABAPENTIN 300 MG: 300 CAPSULE ORAL at 20:06

## 2022-10-24 RX ADMIN — Medication 1 TABLET: at 08:16

## 2022-10-24 RX ADMIN — HYDRALAZINE HYDROCHLORIDE 5 MG: 25 TABLET, FILM COATED ORAL at 05:13

## 2022-10-24 RX ADMIN — ENOXAPARIN SODIUM 70 MG: 80 INJECTION SUBCUTANEOUS at 20:06

## 2022-10-24 RX ADMIN — FUROSEMIDE 40 MG: 40 TABLET ORAL at 17:35

## 2022-10-24 RX ADMIN — Medication 10 ML: at 20:07

## 2022-10-24 RX ADMIN — METOPROLOL TARTRATE 12.5 MG: 25 TABLET, FILM COATED ORAL at 08:16

## 2022-10-24 RX ADMIN — DOCUSATE SODIUM 100 MG: 100 CAPSULE ORAL at 08:16

## 2022-10-24 RX ADMIN — METOPROLOL TARTRATE 12.5 MG: 25 TABLET, FILM COATED ORAL at 20:06

## 2022-10-24 RX ADMIN — Medication 10 ML: at 08:16

## 2022-10-24 RX ADMIN — TAMSULOSIN HYDROCHLORIDE 0.4 MG: 0.4 CAPSULE ORAL at 08:16

## 2022-10-24 RX ADMIN — Medication 10 ML: at 20:05

## 2022-10-24 RX ADMIN — HYDRALAZINE HYDROCHLORIDE 5 MG: 25 TABLET, FILM COATED ORAL at 14:29

## 2022-10-24 NOTE — PLAN OF CARE
Goal Outcome Evaluation:           Progress: no change  Outcome Evaluation: Patient has been able to work with PT and OT. She also has sat up in the chair for meals, patient voices no complaints or concerns at this time. Currently resting in bed

## 2022-10-24 NOTE — THERAPY TREATMENT NOTE
Acute Care - Physical Therapy Treatment Note   [place]     Patient Name: Tre Kinney  : 1932  MRN: [id number]  Today's Date: 10/24/2022   Onset of Illness/Injury or Date of Surgery: 10/06/22  Visit Dx:   No diagnosis found.  Patient Active Problem List   Diagnosis   • Acid reflux   • Ankle arthralgia   • Cardiac conduction disorder   • Closed fracture of distal fibula   • Arteriosclerosis of coronary artery   • Fracture of distal end of tibia   • Elevated cholesterol   • BP (high blood pressure)   • Hypertrophic polyarthritis   • OP (osteoporosis)   • Right heart failure (HCC)   • Heart failure, chronic, right-sided (HCC)   • SOB (shortness of breath)   • Palpitations   • CHF (congestive heart failure) (HCC)   • Congestive heart failure (HCC)   • Precordial pain   • Chronic anticoagulation   • Hypokalemia   • Anemia, unspecified type   • Generalized weakness     Past Medical History:   Diagnosis Date   • Anemia    • Arthritis    • CHF (congestive heart failure) (HCC)    • Chronic kidney disease    • Coronary artery disease    • H/O blood clots    • History of transfusion     no reaction    • Hypertension      Past Surgical History:   Procedure Laterality Date   • ABDOMINAL SURGERY     • APPENDECTOMY     • CARDIAC SURGERY     •  SECTION     • COLONOSCOPY N/A 3/7/2018    Procedure: COLONOSCOPY;  Surgeon: Willie Gresham MD;  Location:  [facility];  Service:    • CORONARY ANGIOPLASTY WITH STENT PLACEMENT     • ENDOSCOPY N/A 3/6/2018    Procedure: ESOPHAGOGASTRODUODENOSCOPY;  Surgeon: Willie Gresham MD;  Location:  [facility];  Service:    • PACEMAKER IMPLANTATION      left side    • SKIN BIOPSY     • TUBAL ABDOMINAL LIGATION       PT Assessment (last 12 hours)     PT Evaluation and Treatment     Row Name 10/24/22 1518          Physical Therapy Time and Intention    Subjective Information complains of;weakness;fatigue  -CT     Document Type therapy note (daily note)  -CT     Mode of  Treatment physical therapy  -CT     Patient Effort good  -CT     Row Name 10/24/22 1518          General Information    Patient Profile Reviewed yes  -CT     Existing Precautions/Restrictions fall;other (see comments)  swan  -CT     Row Name 10/24/22 1518          Pain    Pretreatment Pain Rating --  no pain reported  -CT     Row Name 10/24/22 1518          Cognition    Orientation Status (Cognition) oriented x 3  -CT     Follows Commands (Cognition) WFL  -CT     Row Name 10/24/22 1518          Bed Mobility    Rolling Left [place] (Bed Mobility) minimum assist (75% patient effort);verbal cues  -CT     Rolling Right [place] (Bed Mobility) minimum assist (75% patient effort);verbal cues  -CT     Scooting/Bridging [place] (Bed Mobility) minimum assist (75% patient effort);1 person assist;verbal cues;other (see comments);moderate assist (50% patient effort)  -CT     Supine-Sit [place] (Bed Mobility) minimum assist (75% patient effort);1 person assist;verbal cues  -CT     Sit-Supine [place] (Bed Mobility) moderate assist (50% patient effort);2 person assist;verbal cues  -CT     Bed Mobility, Safety Issues decreased use of legs for bridging/pushing;decreased use of arms for pushing/pulling;impaired trunk control for bed mobility  log roll  -CT     Assistive Device (Bed Mobility) bed rails;draw sheet  -CT     Row Name 10/24/22 1518          Bed-Chair Transfer    Bed-Chair [place] (Transfers) moderate assist (50% patient effort);maximum assist (25% patient effort);2 person assist  -CT     Assistive Device (Bed-Chair Transfers) walker, front-wheeled  -CT     Row Name 10/24/22 1518          Sit-Stand Transfer    Sit-Stand [place] (Transfers) moderate assist (50% patient effort);2 person assist  -CT     Assistive Device (Sit-Stand Transfers) walker, front-wheeled  -CT     Row Name 10/24/22 1518          Stand-Sit Transfer    Stand-Sit [place] (Transfers) minimum assist (75% patient  effort);1 person assist;verbal cues;2 person assist  -CT     Assistive Device (Stand-Sit Transfers) walker, front-wheeled  -CT     Row Name 10/24/22 1518          Gait/Stairs (Locomotion)    [place] (Gait) moderate assist (50% patient effort);2 person assist;verbal cues;nonverbal cues (demo/gesture)  pt requires migel cueing to remain in upright standing  -CT     Assistive Device (Gait) walker, front-wheeled  -CT     Distance in Feet (Gait) 5  -CT     Pattern (Gait) step-to  -CT     Deviations/Abnormal Patterns (Gait) gait speed decreased;festinating/shuffling;weight shifting decreased  -CT     Bilateral Gait Deviations weight shift ability decreased;forward flexed posture;knee buckling bilaterally  -CT     Row Name 10/24/22 1518          Motor Skills    Therapeutic Exercise --  BLE sitting ther ex: LAQ, marches, AP, HIp ab/abd  pt educated on performing BLE ther ex throughout the day  -CT     Row Name 10/24/22 1518          Plan of Care Review    Plan of Care Reviewed With patient  -CT     Row Name 10/24/22 1518          Positioning and Restraints    Pre-Treatment Position in bed  -CT           User Key  (r) = Recorded By, (t) = Taken By, (c) = Cosigned By    Initials Name Provider Type    CT Bhargavi Vásquez, PT Physical Therapist                  PT Recommendation and Plan  Anticipated Discharge Disposition (PT): home with 24/7 care, skilled nursing facility  Planned Therapy Interventions (PT): balance training, bed mobility training, gait training, home exercise program, motor coordination training, manual therapy techniques, neuromuscular re-education, postural re-education, patient/family education, strengthening, transfer training  Therapy Frequency (PT): 5 times/wk (5-6 times/wk)  Progress Summary (PT)  Progress Toward Functional Goals (PT): progress toward functional goals is fair  Plan of Care Reviewed With: patient  Progress: no change   Outcome Measures     Row Name 10/22/22 1300              How much help from another is currently needed...    Putting on and taking off regular lower body clothing? 1  -BF      Bathing (including washing, rinsing, and drying) 2  -BF      Toileting (which includes using toilet bed pan or urinal) 2  -BF      Putting on and taking off regular upper body clothing 3  -BF      Taking care of personal grooming (such as brushing teeth) 3  -BF      Eating meals 3  -BF      AM-PAC 6 Clicks Score (OT) 14  -BF            User Key  (r) = Recorded By, (t) = Taken By, (c) = Cosigned By    Initials Name Provider Type    BF Rosa Aquino, OT Occupational Therapist                 Time Calculation:    PT Charges     Row Name 10/24/22 1527             Time Calculation    PT Received On 10/24/22  -CT         Time Calculation- PT    Total Timed Code Minutes- PT 39 minute(s)  -CT            User Key  (r) = Recorded By, (t) = Taken By, (c) = Cosigned By    Initials Name Provider Type    CT Bhargavi Vásquez, PT Physical Therapist              Therapy Charges for Today     Code Description Service Date Service Provider Modifiers Qty    38932516248 HC PT THER PROC EA 15 MIN 10/24/2022 Bhargavi Vásquez, PT GP 1    89487196324 HC PT THERAPEUTIC ACT EA 15 MIN 10/24/2022 Bhargavi Vásquez, PT GP 1    72717163202 HC GAIT TRAINING EA 15 MIN 10/24/2022 Bhargavi Vásquez, PT GP 1          PT G-Codes  Outcome Measure Options: AM-PAC 6 Clicks Daily Activity (OT)  AM-PAC 6 Clicks Score (PT): 12  AM-PAC 6 Clicks Score (OT): 15    Bhargavi Vásquez PT  10/24/2022

## 2022-10-24 NOTE — CASE MANAGEMENT/SOCIAL WORK
Discharge Planning Assessment   [place]     Patient Name: Tre Kinney  MRN: [id number]  Today's Date: 10/24/2022    Admit Date: 10/5/2022     Discharge Plan     Row Name 10/24/22 1615       Plan    Plan SS discussed pt with swing bed RN. Pt family filed an appeal for additional swing bed days. SS to follow.                 Selected Continued Care - Prior Encounters Includes continued care and service providers with selected services from prior encounters from 7/7/2022 to 10/24/2022    Discharged on 10/5/2022 Admission date: 9/19/2022 - Discharge disposition: Swing Bed    Destination     Service Provider Selected Services Address Phone Fax Patient Preferred     [facility] [address] [place] [address] [place] [address] [phone] -- --                      XANDER Wills

## 2022-10-24 NOTE — PROGRESS NOTES
[facility] HOSPITALIST PROGRESS NOTE    Subjective     History:   Tre Kinney is a 89 y.o. female admitted on 10/5/2022 secondary to <principal problem not specified>     Procedures: None    CC: Follow up debility    Patient seen and examined with SONAM Chase. Awake and alert. Reports improvement in her left leg pain compared to previous. Feels like she is getting stronger. No reported CP or dyspnea. No reported nausea or vomiting. No acute events overnight per RN.     History taken from: patient, chart, and RN.      Objective     Vital Signs  Temp:  [98.1 °F (36.7 °C)] 98.1 °F (36.7 °C)  Heart Rate:  [73-88] 79  Resp:  [18-20] 20  BP: (120-187)/(52-95) 141/68    Intake/Output Summary (Last 24 hours) at 10/24/2022 1351  Last data filed at 10/24/2022 1300  Gross per 24 hour   Intake 1200 ml   Output 1200 ml   Net 0 ml         Physical Exam:  General:    Awake, alert, in no acute distress, chronically ill appearing   Heart:      Normal S1 and S2. Regular rate and rhythm. No significant murmur, rubs or gallops appreciated.   Lungs:     Respirations regular, even and unlabored. Lungs clear to auscultation B/L. No wheezes, rales or rhonchi.   Abdomen:   Soft and nontender. No guarding, rebound tenderness or  organomegaly noted. Bowel sounds present x 4.   Extremities:  No lower extremity edema. Moves UE and LE equally B/L.     Results Review:    Results from last 7 days   Lab Units 10/23/22  0149 10/21/22  0158   WBC 10*3/mm3 7.27 7.43   HEMOGLOBIN g/dL 9.4* 9.6*   PLATELETS 10*3/mm3 233 198     Results from last 7 days   Lab Units 10/23/22  0149 10/22/22  0113 10/21/22  0158   SODIUM mmol/L 139  --  137   POTASSIUM mmol/L 3.9 4.7 3.3*   CHLORIDE mmol/L 101  --  99   CO2 mmol/L 25.2  --  26.0   BUN mg/dL 28*  --  32*   CREATININE mg/dL 1.94*  --  1.98*   CALCIUM mg/dL 8.7  --  8.8   GLUCOSE mg/dL 99  --  107*     Results from last 7 days   Lab Units 10/21/22  0158   BILIRUBIN mg/dL 0.5   ALK PHOS U/L 47    AST (SGOT) U/L 11   ALT (SGPT) U/L 5     Results from last 7 days   Lab Units 10/23/22  0149 10/21/22  0158   MAGNESIUM mg/dL 2.4 1.9               Imaging Results (Last 24 Hours)     ** No results found for the last 24 hours. **            Medications:  docusate sodium, 100 mg, Oral, BID  enoxaparin, 1 mg/kg, Subcutaneous, Q24H  escitalopram, 10 mg, Oral, Daily  furosemide, 40 mg, Oral, BID  gabapentin, 300 mg, Oral, Nightly  hydrALAZINE, 5 mg, Oral, Q8H  melatonin, 10 mg, Oral, Nightly  metoprolol tartrate, 12.5 mg, Oral, Q12H  multivitamin, 1 tablet, Oral, Daily  pantoprazole, 40 mg, Oral, Q AM  sodium chloride, 10 mL, Intravenous, Q12H  sodium chloride, 10 mL, Intravenous, Q12H  tamsulosin, 0.4 mg, Oral, Daily               Assessment & Plan   Generalized weakness/debility  Diffuse LLE DVT  DIANA on CKD IV  Acute blood loss anemia  Traumatic right gluteal hematoma  Mild hypokalemia   Paroxysmal Afib  Hx of SSS s/p pacemaker placement  Essential HTN  Reported hx of DVT's   Anxiety/depression  Advanced age    Plan  Appears to be tolerating therapeutic Lovenox for DVT with stable H&H and no reported signs of bleeding. Will consult pharmacy to evaluate cost of DOAC's. BP has fluctuated but will continue current dose of antihypertensives as hydralazine has been previously reduced 2/2 relative hypotension. Cont current diuretic regimen. Cont PT/OT. Cont supportive treatment. Palliative care input appreciated.     Disposition: Awaiting response from insurance company regarding additional swing bed days.      Mustapha Loredo DO  10/24/22  13:51 EDT

## 2022-10-24 NOTE — PLAN OF CARE
Goal Outcome Evaluation:              Patient has rested in bed this shift, no complaints or request at this time, patient did not ambulate at this time, educated on importance of ambulating, VSS, Will continue plan of care.

## 2022-10-24 NOTE — THERAPY TREATMENT NOTE
Patient Name: Tre Kinney  : 1932    MRN: [id number]                              Today's Date: 10/24/2022       Admit Date: 10/5/2022    Visit Dx: No diagnosis found.  Patient Active Problem List   Diagnosis   • Acid reflux   • Ankle arthralgia   • Cardiac conduction disorder   • Closed fracture of distal fibula   • Arteriosclerosis of coronary artery   • Fracture of distal end of tibia   • Elevated cholesterol   • BP (high blood pressure)   • Hypertrophic polyarthritis   • OP (osteoporosis)   • Right heart failure (HCC)   • Heart failure, chronic, right-sided (HCC)   • SOB (shortness of breath)   • Palpitations   • CHF (congestive heart failure) (HCC)   • Congestive heart failure (HCC)   • Precordial pain   • Chronic anticoagulation   • Hypokalemia   • Anemia, unspecified type   • Generalized weakness     Past Medical History:   Diagnosis Date   • Anemia    • Arthritis    • CHF (congestive heart failure) (HCC)    • Chronic kidney disease    • Coronary artery disease    • H/O blood clots    • History of transfusion     no reaction    • Hypertension      Past Surgical History:   Procedure Laterality Date   • ABDOMINAL SURGERY     • APPENDECTOMY     • CARDIAC SURGERY     •  SECTION     • COLONOSCOPY N/A 3/7/2018    Procedure: COLONOSCOPY;  Surgeon: Willie Gresham MD;  Location:  [facility];  Service:    • CORONARY ANGIOPLASTY WITH STENT PLACEMENT     • ENDOSCOPY N/A 3/6/2018    Procedure: ESOPHAGOGASTRODUODENOSCOPY;  Surgeon: Willie Gresham MD;  Location:  [facility];  Service:    • PACEMAKER IMPLANTATION      left side    • SKIN BIOPSY     • TUBAL ABDOMINAL LIGATION        General Information     Row Name 10/24/22 1432          OT Time and Intention    Document Type progress note/recertification  -     Mode of Treatment individual therapy;occupational therapy  -     Row Name 10/24/22 1432          General Information    Patient Profile Reviewed yes  -KP     Existing  Precautions/Restrictions fall  -     Row Name 10/24/22 1432          Cognition    Orientation Status (Cognition) oriented x 3  -     Row Name 10/24/22 1432          Safety Issues, Functional Mobility    Impairments Affecting Function (Mobility) balance;strength;endurance/activity tolerance  -           User Key  (r) = Recorded By, (t) = Taken By, (c) = Cosigned By    Initials Name Provider Type     Linn Quevedo, OT Occupational Therapist                 Mobility/ADL's     Row Name 10/24/22 1433          Bed Mobility    Bed Mobility rolling right;scooting/bridging;supine-sit  -     Rolling Right [place] (Bed Mobility) standby assist  -     Scooting/Bridging [place] (Bed Mobility) standby assist  -     Supine-Sit [place] (Bed Mobility) minimum assist (75% patient effort)  -     Bed Mobility, Safety Issues decreased use of legs for bridging/pushing;decreased use of arms for pushing/pulling  -     Assistive Device (Bed Mobility) bed rails;draw sheet  -     Row Name 10/24/22 1433          Transfers    Transfers bed-chair transfer  -     Row Name 10/24/22 [facility]3          Bed-Chair Transfer    Bed-Chair [place] (Transfers) maximum assist (25% patient effort);1 person assist  -     Assistive Device (Bed-Chair Transfers) walker, front-wheeled  -     Row Name 10/24/22 1433          Bathing Assessment/Intervention    [place] Level (Bathing) maximum assist (25% patient effort)  -[facility] Name 10/24/22 [facility]3          Upper Body Dressing Assessment/Training    [place] Level (Upper Body Dressing) upper body dressing skills;moderate assist (50% patient effort);maximum assist (25% patient effort)  -[facility] Name 10/24/22 [facility]          Lower Body Dressing Assessment/Training    [place] Level (Lower Body Dressing) lower body dressing skills;maximum assist (25% patient effort);dependent (less than 25% patient effort)  -     Row Name 10/24/22 1433          Grooming  Assessment/Training    [place] Level (Grooming) hair care, combing/brushing;standby assist;verbal cues  -     Position (Grooming) sitting up in bed  -     Row Name 10/24/22 1433          Self-Feeding Assessment/Training    [place] Level (Feeding) set up  -     Row Name 10/24/22 1433          Toileting Assessment/Training    [place] Level (Toileting) dependent (less than 25% patient effort);toileting skills  -           User Key  (r) = Recorded By, (t) = Taken By, (c) = Cosigned By    Initials Name Provider Type    Linn Richardson OT Occupational Therapist               Obj/Interventions     Row Name 10/24/22 1434          Motor Skills    Motor Skills coordination;functional endurance  -     Coordination WFL;bilateral;upper extremity  -     Functional Endurance fair minus  -     Therapeutic Exercise --  BUE AROM through funcional movement patterns while seated up in bed X20 reps X3 sets; hand gripper issued  -           User Key  (r) = Recorded By, (t) = Taken By, (c) = Cosigned By    Initials Name Provider Type    Linn Richardson OT Occupational Therapist               Goals/Plan    No documentation.                Clinical Impression     Row Name 10/24/22 1436          Pain Assessment    Pretreatment Pain Rating 0/10 - no pain  -     Posttreatment Pain Rating 0/10 - no pain  -     Row Name 10/24/22 1436          Plan of Care Review    Plan of Care Reviewed With patient  -     Progress no change  -     Outcome Evaluation Patient seen for OT treatment session. She continues to require significant assist with ADLs and functional transfers/mobility, but improved tolerance on this date. She was able to move to edge of bed without physical assist with HOB elevated and bedrail use. Continue plan of care. Focus on promoting overall improved functional endurance/activity tolerance and out of bed activity.  -     Row Name 10/24/22 1436          Therapy Plan Review/Discharge Plan  (OT)    Anticipated Discharge Disposition (OT) skilled nursing facility;home with 24/7 care;home with home health  -           User Key  (r) = Recorded By, (t) = Taken By, (c) = Cosigned By    Initials Name Provider Type    Linn Richardson OT Occupational Therapist               Outcome Measures     Row Name 10/24/22 1438          How much help from another is currently needed...    Putting on and taking off regular lower body clothing? 2  -KP     Bathing (including washing, rinsing, and drying) 2  -KP     Toileting (which includes using toilet bed pan or urinal) 2  -KP     Putting on and taking off regular upper body clothing 3  -KP     Taking care of personal grooming (such as brushing teeth) 3  -KP     Eating meals 3  -KP     AM-PAC 6 Clicks Score (OT) 15  -     Row Name 10/24/22 0816          How much help from another person do you currently need...    Turning from your back to your side while in flat bed without using bedrails? 3  -LG     Moving from lying on back to sitting on the side of a flat bed without bedrails? 2  -LG     Moving to and from a bed to a chair (including a wheelchair)? 2  -LG     Standing up from a chair using your arms (e.g., wheelchair, bedside chair)? 2  -LG     Climbing 3-5 steps with a railing? 1  -LG     To walk in hospital room? 2  -LG     AM-PAC 6 Clicks Score (PT) 12  -LG     Highest level of mobility 4 --> Transferred to chair/commode  -     Row Name 10/24/22 1438          Functional Assessment    Outcome Measure Options AM-PAC 6 Clicks Daily Activity (OT)  -           User Key  (r) = Recorded By, (t) = Taken By, (c) = Cosigned By    Initials Name Provider Type    eRna Mckeon RN Registered Nurse    Linn Richardson OT Occupational Therapist                  OT Recommendation and Plan     Plan of Care Review  Plan of Care Reviewed With: patient  Progress: no change  Outcome Evaluation: Patient seen for OT treatment session. She continues to require  significant assist with ADLs and functional transfers/mobility, but improved tolerance on this date. She was able to move to edge of bed without physical assist with HOB elevated and bedrail use. Continue plan of care. Focus on promoting overall improved functional endurance/activity tolerance and out of bed activity.     Time Calculation:    Time Calculation- OT     Row Name 10/24/22 1439             Time Calculation- OT    OT Received On 10/24/22  -KP         Timed Charges    94805 - OT Therapeutic Exercise Minutes 18  -KP      57232 - OT Therapeutic Activity Minutes 15  -KP         Total Minutes    Timed Charges Total Minutes 33  -KP       Total Minutes 33  -KP            User Key  (r) = Recorded By, (t) = Taken By, (c) = Cosigned By    Initials Name Provider Type     Linn Quevedo OT Occupational Therapist              Therapy Charges for Today     Code Description Service Date Service Provider Modifiers Qty    45287630491  OT THER PROC EA 15 MIN 10/24/2022 Linn Quevedo OT GO 1    [id number]  OT THERAPEUTIC ACT EA 15 MIN 10/24/2022 Linn Quevedo OT GO 1               Linn Quevedo OT  10/24/2022

## 2022-10-25 PROCEDURE — 97116 GAIT TRAINING THERAPY: CPT

## 2022-10-25 PROCEDURE — 97535 SELF CARE MNGMENT TRAINING: CPT

## 2022-10-25 PROCEDURE — 25010000002 ENOXAPARIN PER 10 MG: Performed by: INTERNAL MEDICINE

## 2022-10-25 PROCEDURE — 97110 THERAPEUTIC EXERCISES: CPT

## 2022-10-25 RX ADMIN — METOPROLOL TARTRATE 12.5 MG: 25 TABLET, FILM COATED ORAL at 20:59

## 2022-10-25 RX ADMIN — Medication 10 ML: at 22:24

## 2022-10-25 RX ADMIN — HYDRALAZINE HYDROCHLORIDE 5 MG: 25 TABLET, FILM COATED ORAL at 05:15

## 2022-10-25 RX ADMIN — DOCUSATE SODIUM 100 MG: 100 CAPSULE ORAL at 20:59

## 2022-10-25 RX ADMIN — FUROSEMIDE 40 MG: 40 TABLET ORAL at 17:13

## 2022-10-25 RX ADMIN — HYDRALAZINE HYDROCHLORIDE 5 MG: 25 TABLET, FILM COATED ORAL at 13:50

## 2022-10-25 RX ADMIN — Medication 10 MG: at 20:59

## 2022-10-25 RX ADMIN — Medication 10 ML: at 08:46

## 2022-10-25 RX ADMIN — ENOXAPARIN SODIUM 70 MG: 80 INJECTION SUBCUTANEOUS at 20:58

## 2022-10-25 RX ADMIN — FUROSEMIDE 40 MG: 40 TABLET ORAL at 08:45

## 2022-10-25 RX ADMIN — HYDRALAZINE HYDROCHLORIDE 5 MG: 25 TABLET, FILM COATED ORAL at 21:00

## 2022-10-25 RX ADMIN — METOPROLOL TARTRATE 12.5 MG: 25 TABLET, FILM COATED ORAL at 08:45

## 2022-10-25 RX ADMIN — ESCITALOPRAM 10 MG: 10 TABLET, FILM COATED ORAL at 08:45

## 2022-10-25 RX ADMIN — TAMSULOSIN HYDROCHLORIDE 0.4 MG: 0.4 CAPSULE ORAL at 08:45

## 2022-10-25 RX ADMIN — Medication 1 TABLET: at 08:45

## 2022-10-25 RX ADMIN — DOCUSATE SODIUM 100 MG: 100 CAPSULE ORAL at 08:45

## 2022-10-25 RX ADMIN — PANTOPRAZOLE SODIUM 40 MG: 40 TABLET, DELAYED RELEASE ORAL at 05:15

## 2022-10-25 RX ADMIN — GABAPENTIN 300 MG: 300 CAPSULE ORAL at 20:58

## 2022-10-25 NOTE — THERAPY TREATMENT NOTE
Acute Care - Physical Therapy Treatment Note   [place]     Patient Name: Tre Kinney  : 1932  MRN: [id number]  Today's Date: 10/25/2022   Onset of Illness/Injury or Date of Surgery: 10/06/22  Visit Dx:   No diagnosis found.  Patient Active Problem List   Diagnosis   • Acid reflux   • Ankle arthralgia   • Cardiac conduction disorder   • Closed fracture of distal fibula   • Arteriosclerosis of coronary artery   • Fracture of distal end of tibia   • Elevated cholesterol   • BP (high blood pressure)   • Hypertrophic polyarthritis   • OP (osteoporosis)   • Right heart failure (HCC)   • Heart failure, chronic, right-sided (HCC)   • SOB (shortness of breath)   • Palpitations   • CHF (congestive heart failure) (HCC)   • Congestive heart failure (HCC)   • Precordial pain   • Chronic anticoagulation   • Hypokalemia   • Anemia, unspecified type   • Generalized weakness     Past Medical History:   Diagnosis Date   • Anemia    • Arthritis    • CHF (congestive heart failure) (HCC)    • Chronic kidney disease    • Coronary artery disease    • H/O blood clots    • History of transfusion     no reaction    • Hypertension      Past Surgical History:   Procedure Laterality Date   • ABDOMINAL SURGERY     • APPENDECTOMY     • CARDIAC SURGERY     •  SECTION     • COLONOSCOPY N/A 3/7/2018    Procedure: COLONOSCOPY;  Surgeon: Willie Gresham MD;  Location:  [facility];  Service:    • CORONARY ANGIOPLASTY WITH STENT PLACEMENT     • ENDOSCOPY N/A 3/6/2018    Procedure: ESOPHAGOGASTRODUODENOSCOPY;  Surgeon: Willie Gresham MD;  Location:  [facility];  Service:    • PACEMAKER IMPLANTATION      left side    • SKIN BIOPSY     • TUBAL ABDOMINAL LIGATION       PT Assessment (last 12 hours)     PT Evaluation and Treatment     Row Name 10/25/22 1255          Physical Therapy Time and Intention    Subjective Information complains of;weakness;fatigue  -CT     Document Type therapy note (daily note)  -CT     Mode of  Treatment physical therapy  -CT     Patient Effort adequate  -CT     Comment Treatment performeed this date [id number]. OT present during treatment and assisted with sit to stand and transfer. No family present or available to be present during treatment session. OT Татьяна contacted Pelon Burr by phone and he was unavailable to be present during session and requested therapist contact Pelon Dorsey by neelima/lance conference. Lead RN Ngoc attempted to contact Pelon Dorsey by [employer] with no answer. Pt performed seated ther ex well but was unable to ambulate this date d/t being Max/Dep x 2 for sit to stand and to remain standing. Pt was incontinent of bowl during transfer to bed. Pt required constant cueing to remain standing but was unable to stand during duration of being cleaned.  -CT     Row Name 10/25/22 1253          General Information    Patient Profile Reviewed yes  -CT     Existing Precautions/Restrictions fall;other (see comments);TLSO  swan; pt continues to decline use of TLSO  -CT     Row Name 10/25/22 1258          Pain    Pretreatment Pain Rating --  no pain reported  -CT     Row Name 10/25/22 0711          Cognition    Orientation Status (Cognition) oriented to;person;place;verbal cues/prompts needed for orientation;time  pt oriented to month but not day  -CT     Follows Commands (Cognition) WFL  -CT     Row Name 10/25/22 1258          Bed Mobility    Scooting/Bridging [place] (Bed Mobility) moderate assist (50% patient effort);1 person to manage equipment;2 person assist;verbal cues;nonverbal cues (demo/gesture)  -CT     Sit-Supine [place] (Bed Mobility) moderate assist (50% patient effort);2 person assist;verbal cues  -CT     Bed Mobility, Safety Issues decreased use of legs for bridging/pushing;decreased use of arms for pushing/pulling;impaired trunk control for bed mobility  log roll  -CT     Assistive Device (Bed Mobility) bed rails;draw sheet  -CT     Row Name 10/25/22 1050          Chair-Bed  Transfer    Chair-Bed [place] (Transfers) maximum assist (25% patient effort);dependent (less than 25% patient effort);2 person assist;verbal cues;nonverbal cues (demo/gesture)  stand pivot from chair d/t not being able to ambulate this date  -CT     Row Name 10/25/22 1555          Sit-Stand Transfer    Sit-Stand [place] (Transfers) maximum assist (25% patient effort);dependent (less than 25% patient effort);2 person assist;verbal cues;nonverbal cues (demo/gesture)  -CT     Assistive Device (Sit-Stand Transfers) walker, front-wheeled  -CT     Comment, (Sit-Stand Transfer) pt unable to remain in upright standing position  -CT     Row Name 10/25/22 3870          Stand-Sit Transfer    Stand-Sit [place] (Transfers) moderate assist (50% patient effort);2 person assist;verbal cues;nonverbal cues (demo/gesture)  pt sat down abruptly for standing  -CT     Assistive Device (Stand-Sit Transfers) walker, front-wheeled  -CT     Row Name 10/25/22 7776          Gait/Stairs (Locomotion)    [place] Level (Gait) dependent (less than 25% patient effort);2 person assist;verbal cues;nonverbal cues (demo/gesture)  -CT     Assistive Device (Gait) walker, front-wheeled  -CT     Distance in Feet (Gait) 2 small steps during transfer but was unable to ambulate further  -CT     Pattern (Gait) step-to  -CT     Deviations/Abnormal Patterns (Gait) gait speed decreased;festinating/shuffling;weight shifting decreased  -CT     Bilateral Gait Deviations weight shift ability decreased;forward flexed posture;knee buckling bilaterally  -CT     Comment, (Gait/Stairs) pt requires constant cueing to remain upright but was unable  -CT     Row Name 10/25/22 0903          Balance    Static Standing Balance dependent;2-person assist  -CT     Position/Device Used, Standing Balance walker, front-wheeled  -CT     Row Name 10/25/22 3508          Motor Skills    Therapeutic Exercise --  BLE sitting ther ex in beside chair: seated GILDA coon,  hip abb/add, ham curl with yellow TB, glut sets (15 reps)  -CT     Row Name 10/25/22 1255          Coping    Observed Emotional State calm;cooperative  -CT     Verbalized Emotional State acceptance  -CT     Row Name 10/25/22 1255          Plan of Care Review    Plan of Care Reviewed With patient  -CT     Row Name 10/25/22 1255          Positioning and Restraints    Pre-Treatment Position sitting in chair/recliner  -CT     Post Treatment Position bed  -CT     In Bed sitting;call light within reach;encouraged to call for assist;with nsg;side rails up x3  -CT           User Key  (r) = Recorded By, (t) = Taken By, (c) = Cosigned By    Initials Name Provider Type    CT Bhargavi Vásquez, PT Physical Therapist                  PT Recommendation and Plan  Anticipated Discharge Disposition (PT): home with /7 [facility], [facility] nursing [facility]  Planned Therapy Interventions (PT): balance training, bed mobility training, gait training, home exercise program, motor coordination training, manual therapy techniques, neuromuscular re-education, postural re-education, patient/family education, strengthening, transfer training  Therapy Frequency (PT): 5 times/wk (5-6 times/wk)  Progress Summary (PT)  Progress Toward Functional Goals (PT): progress toward functional goals is fair  Plan of Care Reviewed With: patient  Progress: no change   Outcome Measures     Row Name 10/25/22 2594             How much help from another is currently needed...    Putting on and taking off regular lower body clothing? 2  -LM      Bathing (including washing, rinsing, and drying) 2  -LM      Toileting (which includes using toilet bed pan or urinal) 2  -LM      Putting on and taking off regular upper body clothing 3  -LM      Taking care of personal grooming (such as brushing teeth) 3  -LM      Eating meals 3  -LM      AM-PAC 6 Clicks Score (OT) 15  -LM         Functional Assessment    Outcome Measure Options AM-PAC 6 Clicks Daily Activity (OT)  -LM             User Key  (r) = Recorded By, (t) = Taken By, (c) = Cosigned By    Initials Name Provider Type    LM Татьяна Bah, OT Occupational Therapist                 Time Calculation:    PT Charges     Row Name 10/25/22 1403             Time Calculation    Start Time 1255  -CT      Stop Time 1337  -CT      Time Calculation (min) 42 min  -CT      PT Received On 10/25/22  -CT         Time Calculation- PT    Total Timed Code Minutes- PT 27 minute(s)  -CT            User Key  (r) = Recorded By, (t) = Taken By, (c) = Cosigned By    Initials Name Provider Type    CT Bhargavi Vásquez, PT Physical Therapist              Therapy Charges for Today     Code Description Service Date Service Provider Modifiers Qty    31666739870 HC PT THER PROC EA 15 MIN 10/24/2022 Bhargavi Vásquez, PT GP 1    10706443329 HC PT THERAPEUTIC ACT EA 15 MIN 10/24/2022 Bhargavi Vásquez, PT GP 1    98299560357 HC GAIT TRAINING EA 15 MIN 10/24/2022 Bhargavi Vásquez, PT GP 1    56926513174 HC GAIT TRAINING EA 15 MIN 10/25/2022 Bhargavi Vásquez, PT GP 1    60239407584 HC PT THER PROC EA 15 MIN 10/25/2022 Bhargavi Vásquez, PT GP 1          PT G-Codes  Outcome Measure Options: AM-PAC 6 Clicks Daily Activity (OT)  AM-PAC 6 Clicks Score (PT): 12  AM-PAC 6 Clicks Score (OT): 15    Bhargavi Vásquez PT  10/25/2022

## 2022-10-25 NOTE — PHARMACY PATIENT ASSISTANCE
Pharmacy was consulted for cost of DOACs for DVT treatment. Based on current Cr[facility], Eliquis would be preferred. Copay would be $47 per insurance, which patient reports is unaffordable. She reported to patient assistance technician that she would rather stay on warfarin. However, since notes indicate the risk of warfarin outweigh the benefits, we will apply for a tier exception through her insurance to attempt to lower the copay. Will update when more information is available.    Thank you,  Matilda Villalobos, PharmD       Update: unfortunately $47 is the lowest tier on patient's insurance so her copay cannot be lowered any further. Patient has been spoken to about applying for  assistance program and agreed to bring in financial information after discharge to apply. Patient can be provided a 30 day free trial card for discharge as well.  Matilda Villalobos, PharmD

## 2022-10-25 NOTE — PLAN OF CARE
Goal Outcome Evaluation:           Progress: no change  Outcome Evaluation: Pt resting well in bed this shift. No complaints or acute changes. Pt ambulated in room. VSS. Will continue with plan of care.

## 2022-10-25 NOTE — PROGRESS NOTES
"Palliative Care Daily Progress Note     S: Medical record reviewed, followed up with Primary RN Rena and Dr Loredo regarding patient's condition. When palliative entered the room the pt was sitting up in bedside recliner with her lunch in front of her, she had dozed off but woke when she heard us enter room. I made sure that Tre knew her lunch was there, she stated that she did know that she did not want to eat it, I asked her if there was something else that we could get her and she just said she wasn't hungry at that time. She was awake alert and oriented and when I asked her about symptoms, she denied pain, shortness of breath, anxiety or nausea, she just stated she was a little sleepy today.      O:   Palliative Performance Scale Score:     /72 (BP Location: Right arm, Patient Position: Lying)   Pulse 82   Temp 98.3 °F (36.8 °C) (Oral)   Resp 18   Ht 170.2 cm (67.01\")   Wt 74.3 kg (163 lb 11.2 oz)   SpO2 95%   BMI 25.63 kg/m²     Intake/Output Summary (Last 24 hours) at 10/25/2022 1700  Last data filed at 10/25/2022 1300  Gross per 24 hour   Intake 1440 ml   Output 1900 ml   Net -460 ml       PE:  General Appearance:    She is awake and alert, cooperative, NAD   HEENT:    NC/AT, without obvious abnormality, EOMI, anicteric    Neck:   supple, trachea midline, no JVD   Lungs:     Unlabored respirations, on room air.    Heart:    RRR, normal S1 and S2, no M/R/G   Abdomen:     Soft, non tender non distended, no complaints of nausea   Extremities:   Moves all extremities, no edema, sitting up in BS recliner   Pulses:   Pulses palpable and equal bilaterally   Skin:   Warm, dry   Neurologic:   A/Ox3, cooperative   Psych:   Calm, appropriate         Meds: Reviewed and changes noted.    Labs:   Results from last 7 days   Lab Units 10/23/22  0149   WBC 10*3/mm3 7.27   HEMOGLOBIN g/dL 9.4*   HEMATOCRIT % 28.9*   PLATELETS 10*3/mm3 233     Results from last 7 days   Lab Units 10/23/22  0149   SODIUM mmol/L " 139   POTASSIUM mmol/L 3.9   CHLORIDE mmol/L 101   CO2 mmol/L 25.2   BUN mg/dL 28*   CREATININE mg/dL 1.94*   GLUCOSE mg/dL 99   CALCIUM mg/dL 8.7     Results from last 7 days   Lab Units 10/23/22  0149 10/22/22  0113 10/21/22  0158   SODIUM mmol/L 139  --  137   POTASSIUM mmol/L 3.9   < > 3.3*   CHLORIDE mmol/L 101  --  99   CO2 mmol/L 25.2  --  26.0   BUN mg/dL 28*  --  32*   CREATININE mg/dL 1.94*  --  1.98*   CALCIUM mg/dL 8.7  --  8.8   BILIRUBIN mg/dL  --   --  0.5   ALK PHOS U/L  --   --  47   ALT (SGPT) U/L  --   --  5   AST (SGOT) U/L  --   --  11   GLUCOSE mg/dL 99  --  107*    < > = values in this interval not displayed.     Imaging Results (Last 72 Hours)     ** No results found for the last 72 hours. **            Diagnostics: Reviewed    A: Tre Kinney is a 89 y.o.  female admitted on 10/5/2022 to [facility] for physical therapy for ten days. She was originally admitted to the hospital on 9/19/2022 due to a fall and symptomatic anemia. She has a medical history CHF, chronic kidney disease, coronary artery disease s/p stenting, history of blood clots, chronic anticoagulation with Coumadin, SSS s/p pacemaker placement, chronic diastolic CHF, and iron deficiency anemia who presents today with a chief complaint of a fall. Per pt she has become progressively more weak and has experienced several falls. Pt has sustained a moderate acute superior endplate vertebral compression fracture at the L1 with about 40% loss of vertebral body height and mild posterior osseous retropulsion of about 6 mm and also noted to have chronic compression fracture deformities involving the inferior endplates of L3 and L4 due to her first fall and from her second fall she does have a large hematoma on her right side, which led her to this admission. Pt had a hemoglobin of 6.1 for which she received 2 units of PRBC's. Pt denies any known source of bleeding and does say she had a scope(EGD colonoscopy at [facility] in 2018.   Today   10/25/2022  She was afebrile, HR 82, RR 18 and BP of 136/72, she was awake alert and oriented, overall appeared to be feeling better, up in BS recliner today      P:  Palliative was able to touch base with son Leno to update and provide support. We let him know that she hadn't wanted to eat her lunch but overall has been eating much better.  is working with family on discharge disposition.    We will continue to follow along. Please do not hesitate to contact us regarding further sx mgmt or GOC needs, including after hours or on weekends via our on call provider at [phone].     Liyah Jaquez, APRN    10/25/2022

## 2022-10-25 NOTE — PLAN OF CARE
Goal Outcome Evaluation:           Progress: no change  Outcome Evaluation: Patient has been able to work with PT and OT today as well as ambulate with nursing staff, patient has had no complaints or conerns, VSS

## 2022-10-25 NOTE — NURSING NOTE
Therapy at bedside.  Attempted to contact padmaja Dorsey at [phone] for video call with no answer.

## 2022-10-25 NOTE — THERAPY TREATMENT NOTE
Acute Care - Occupational Therapy Treatment Note   [facility]     Patient Name: Tre Kinney  : 1932  MRN: [id number]  Today's Date: 10/25/2022  Onset of Illness/Injury or Date of Surgery: 10/06/22     Referring Physician: Janneth    Admit Date: 10/5/2022     No diagnosis found.  Patient Active Problem List   Diagnosis   • Acid reflux   • Ankle arthralgia   • Cardiac conduction disorder   • Closed fracture of distal fibula   • Arteriosclerosis of coronary artery   • Fracture of distal end of tibia   • Elevated cholesterol   • BP (high blood pressure)   • Hypertrophic polyarthritis   • OP (osteoporosis)   • Right heart failure (HCC)   • Heart failure, chronic, right-sided (HCC)   • SOB (shortness of breath)   • Palpitations   • CHF (congestive heart failure) (HCC)   • Congestive heart failure (HCC)   • Precordial pain   • Chronic anticoagulation   • Hypokalemia   • Anemia, unspecified type   • Generalized weakness     Past Medical History:   Diagnosis Date   • Anemia    • Arthritis    • CHF (congestive heart failure) ([facility])    • Chronic kidney disease    • Coronary artery disease    • H/O blood clots    • History of transfusion     no reaction    • Hypertension      Past Surgical History:   Procedure Laterality Date   • ABDOMINAL SURGERY     • APPENDECTOMY     • CARDIAC SURGERY     •  SECTION     • COLONOSCOPY N/A 3/7/2018    Procedure: COLONOSCOPY;  Surgeon: Willie Gresham MD;  Location:  [facility];  Service:    • CORONARY ANGIOPLASTY WITH STENT PLACEMENT     • ENDOSCOPY N/A 3/6/2018    Procedure: ESOPHAGOGASTRODUODENOSCOPY;  Surgeon: Willie Gresham MD;  Location:  [facility];  Service:    • PACEMAKER IMPLANTATION      left side    • SKIN BIOPSY     • TUBAL ABDOMINAL LIGATION           OT ASSESSMENT FLOWSHEET (last 12 hours)     OT Evaluation and Treatment     Row Name 10/25/22 1341                   OT Time and Intention    Subjective Information complains of;weakness;fatigue  -LM         Document Type therapy note (daily note)  -LM        Mode of Treatment occupational therapy  -LM        Patient Effort adequate  -LM        Comment Patient seen this date from 12:55-13:37.  Present with PT to assist with standing activities and fxl mobility.  Called son Leno via telephone and discussed treatment plan this afternoon, but son unable to be present.  He did request for us to call brother Willian.  Lead Nurse did attempt calling Willian with no success.  Patient performed light bue arom tasks while seated following PT LE therex.  Patient performed BUE FME strengthening tasks opening various condiments, containers with good success.  Patient currently able to self-feed with setup but did not want to eat lunch this date.  Patient required max-total assist x 2 person assist for standing from bedside chair with RW.  Patient was incontinent bowel and unable to transfer safely due inability achieve full upright standing.  Nursing provided total assist with toileting hygiene.  Moved bedside chair closer HB for safe transfer and required max/total assist x 2 using RW.  Patient conitnues to require max assist overall with BADL and fxl mobility.  -LM           Cognition    Affect/Mental Status (Cognition) WNL  -LM        Orientation Status (Cognition) oriented x 3  -LM        Personal Safety Interventions fall prevention program maintained;gait belt;nonskid shoes/slippers when out of bed  -LM           Positioning and Restraints    Pre-Treatment Position sitting in chair/recliner  -LM        Post Treatment Position bed  -LM        In Bed call light within reach;encouraged to call for assist;with nsg  -LM              User Key  (r) = Recorded By, (t) = Taken By, (c) = Cosigned By    Initials Name Effective Dates    LM Татьяна Bah, OT 06/16/21 -                        OT Recommendation and Plan           Outcome Measures     Row Name 10/25/22 6758             How much help from another is currently needed...    Putting  on and taking off regular lower body clothing? 2  -LM      Bathing (including washing, rinsing, and drying) 2  -LM      Toileting (which includes using toilet bed pan or urinal) 2  -LM      Putting on and taking off regular upper body clothing 3  -LM      Taking care of personal grooming (such as brushing teeth) 3  -LM      Eating meals 3  -LM      AM-PAC 6 Clicks Score (OT) 15  -LM         Functional Assessment    Outcome Measure Options AM-PAC 6 Clicks Daily Activity (OT)  -LM            User Key  (r) = Recorded By, (t) = Taken By, (c) = Cosigned By    Initials Name Provider Type    Татьяна Mccabe, OT Occupational Therapist                Time Calculation:    Time Calculation- OT     Row Name 10/25/22 4611             Time Calculation- OT    Total Timed Code Minutes- OT 15 minute(s)  -LM            User Key  (r) = Recorded By, (t) = Taken By, (c) = Cosigned By    Initials Name Provider Type    Татьяна Mccabe, OT Occupational Therapist              Therapy Charges for Today     Code Description Service Date Service Provider Modifiers Qty    03603245172  OT SELF CARE/MGMT/TRAIN EA 15 MIN 10/25/2022 Татьяна Bah OT GO 1               Татьяна Bah OT  10/25/2022

## 2022-10-25 NOTE — PROGRESS NOTES
Pt admitted to swing bed on 10/5 for additional therapy in the setting of generalized weakness/debility. Pt not seen or examined today. I have reviewed her VS. I have discussed her care with SONAM Chase who does not report any acute events or issues that need to be addressed. BP stable today so will continue current regimen including hydralazine, metoprolol and lasix. Pharmacy has evaluated cost of DOAC's for treatment of DVT. Pt states she will be unable to afford copay and pharmacy asking for tier exception through insurance to attempt to lower copay as risk of warfarin thought to outweigh benefits. Currently awaiting response from insurance company regarding appeal/approval of additional swing bed days.        Mustapha Loredo,   10/25/22  15:52 EDT

## 2022-10-26 PROCEDURE — 25010000002 ENOXAPARIN PER 10 MG: Performed by: INTERNAL MEDICINE

## 2022-10-26 PROCEDURE — 97110 THERAPEUTIC EXERCISES: CPT

## 2022-10-26 PROCEDURE — 97116 GAIT TRAINING THERAPY: CPT

## 2022-10-26 PROCEDURE — 97530 THERAPEUTIC ACTIVITIES: CPT

## 2022-10-26 RX ADMIN — Medication 10 MG: at 21:10

## 2022-10-26 RX ADMIN — PANTOPRAZOLE SODIUM 40 MG: 40 TABLET, DELAYED RELEASE ORAL at 05:43

## 2022-10-26 RX ADMIN — Medication 10 ML: at 21:10

## 2022-10-26 RX ADMIN — METOPROLOL TARTRATE 12.5 MG: 25 TABLET, FILM COATED ORAL at 08:40

## 2022-10-26 RX ADMIN — Medication 10 ML: at 08:40

## 2022-10-26 RX ADMIN — Medication 10 ML: at 21:11

## 2022-10-26 RX ADMIN — DOCUSATE SODIUM 100 MG: 100 CAPSULE ORAL at 08:40

## 2022-10-26 RX ADMIN — TAMSULOSIN HYDROCHLORIDE 0.4 MG: 0.4 CAPSULE ORAL at 08:39

## 2022-10-26 RX ADMIN — FUROSEMIDE 40 MG: 40 TABLET ORAL at 18:43

## 2022-10-26 RX ADMIN — HYDRALAZINE HYDROCHLORIDE 5 MG: 25 TABLET, FILM COATED ORAL at 21:10

## 2022-10-26 RX ADMIN — HYDRALAZINE HYDROCHLORIDE 5 MG: 25 TABLET, FILM COATED ORAL at 05:43

## 2022-10-26 RX ADMIN — ENOXAPARIN SODIUM 70 MG: 80 INJECTION SUBCUTANEOUS at 21:10

## 2022-10-26 RX ADMIN — HYDRALAZINE HYDROCHLORIDE 5 MG: 25 TABLET, FILM COATED ORAL at 14:48

## 2022-10-26 RX ADMIN — GABAPENTIN 300 MG: 300 CAPSULE ORAL at 21:12

## 2022-10-26 RX ADMIN — METOPROLOL TARTRATE 12.5 MG: 25 TABLET, FILM COATED ORAL at 21:10

## 2022-10-26 RX ADMIN — Medication 10 ML: at 08:38

## 2022-10-26 RX ADMIN — ESCITALOPRAM 10 MG: 10 TABLET, FILM COATED ORAL at 08:39

## 2022-10-26 RX ADMIN — FUROSEMIDE 40 MG: 40 TABLET ORAL at 08:40

## 2022-10-26 RX ADMIN — Medication 1 TABLET: at 08:39

## 2022-10-26 NOTE — THERAPY TREATMENT NOTE
Acute Care - Physical Therapy Treatment Note   [facility]     Patient Name: Tre Kinney  : 1932  MRN: [id number]  Today's Date: 10/26/2022   Onset of Illness/Injury or Date of Surgery: 10/06/22  Visit Dx:   No diagnosis found.  Patient Active Problem List   Diagnosis   • Acid reflux   • Ankle arthralgia   • Cardiac conduction disorder   • Closed fracture of distal fibula   • Arteriosclerosis of coronary artery   • Fracture of distal end of tibia   • Elevated cholesterol   • BP (high blood pressure)   • Hypertrophic polyarthritis   • OP (osteoporosis)   • Right heart failure (HCC)   • Heart failure, chronic, right-sided (HCC)   • SOB (shortness of breath)   • Palpitations   • CHF (congestive heart failure) (HCC)   • Congestive heart failure (HCC)   • Precordial pain   • Chronic anticoagulation   • Hypokalemia   • Anemia, unspecified type   • Generalized weakness     Past Medical History:   Diagnosis Date   • Anemia    • Arthritis    • CHF (congestive heart failure) (HCC)    • Chronic kidney disease    • Coronary artery disease    • H/O blood clots    • History of transfusion     no reaction    • Hypertension      Past Surgical History:   Procedure Laterality Date   • ABDOMINAL SURGERY     • APPENDECTOMY     • CARDIAC SURGERY     •  SECTION     • COLONOSCOPY N/A 3/7/2018    Procedure: COLONOSCOPY;  Surgeon: Willie Gresham MD;  Location:  [facility];  Service:    • CORONARY ANGIOPLASTY WITH STENT PLACEMENT     • ENDOSCOPY N/A 3/6/2018    Procedure: ESOPHAGOGASTRODUODENOSCOPY;  Surgeon: Willie Gresham MD;  Location:  [facility];  Service:    • PACEMAKER IMPLANTATION      left side    • SKIN BIOPSY     • TUBAL ABDOMINAL LIGATION       PT Assessment (last 12 hours)     PT Evaluation and Treatment     Row Name 10/26/22 1255          Physical Therapy Time and Intention    Subjective Information complains of;weakness;fatigue  -CT     Document Type therapy note (daily note)  -CT     Mode of  Treatment physical therapy  -CT     Patient Effort adequate  -CT     Symptoms Noted During/After Treatment fatigue  -CT     Comment Treatment time 8670-2836. Pts Son Leno present during treatment session. OT Татьяна also present during treatment to assist with sit to stand and transfer to bed. Pt performed BLE strengthening exercises while sitting in chair. Pt performed sit to stand with Max A x 2 but again had bowl incontinence with standing and required PCA to assist with cleaning pt while standing. During standing pt has difficulty with maintaining upright position and requires constant cueing to remain in full upright. Pt ambulated 3 ft to bed from chair with Max/Dep x 2 assist and again continues to sit early in transfer creating a safety concern for pt and staff. Pt sat EOB for core strengthening for aprox 4 minutes and was put back to bed.  -CT     Row Name 10/26/22 1257          General Information    Patient Profile Reviewed yes  -CT     Existing Precautions/Restrictions fall;other (see comments);TLSO  swan; pt continues to decline use of TLSO  -CT     Row Name 10/26/22 1255          Pain    Pretreatment Pain Rating --  no pain reported  -CT     Row Name 10/26/22 1255          Cognition    Orientation Status (Cognition) oriented to;person;place;verbal cues/prompts needed for orientation;time  -CT     Follows Commands (Cognition) WFL  -CT     Row Name 10/26/22 1258          Bed Mobility    Scooting/Bridging Amlin (Bed Mobility) moderate assist (50% patient effort);1 person to manage equipment;2 person assist;verbal cues;nonverbal cues (demo/gesture)  -CT     Sit-Supine Amlin (Bed Mobility) moderate assist (50% patient effort);2 person assist;verbal cues  requires assist of BLE  -CT     Bed Mobility, Safety Issues decreased use of legs for bridging/pushing;decreased use of arms for pushing/pulling;impaired trunk control for bed mobility  log roll  -CT     Assistive Device (Bed Mobility) bed  rails;draw sheet  -CT     Row Name 10/26/22 1255          Chair-Bed Transfer    Chair-Bed Lohn (Transfers) maximum assist (25% patient effort);dependent (less than 25% patient effort);2 person assist;verbal cues;nonverbal cues (demo/gesture)  -CT     Assistive Device (Chair-Bed Transfers) walker, front-wheeled  -CT     Row Name 10/26/22 1255          Sit-Stand Transfer    Sit-Stand Lohn (Transfers) maximum assist (25% patient effort);dependent (less than 25% patient effort);2 person assist;verbal cues;nonverbal cues (demo/gesture)  -CT     Assistive Device (Sit-Stand Transfers) walker, front-wheeled  -CT     Row Name 10/26/22 1255          Stand-Sit Transfer    Stand-Sit Lohn (Transfers) maximum assist (25% patient effort);dependent (less than 25% patient effort);2 person assist  pt attempts to sit early during transfer creating a safety concern  -CT     Assistive Device (Stand-Sit Transfers) walker, front-wheeled  -CT     Row Name 10/26/22 1255          Gait/Stairs (Locomotion)    Lohn Level (Gait) maximum assist (25% patient effort);dependent (less than 25% patient effort);2 person assist;verbal cues;nonverbal cues (demo/gesture)  -CT     Assistive Device (Gait) walker, front-wheeled  -CT     Distance in Feet (Gait) 3  -CT     Pattern (Gait) step-to  -CT     Deviations/Abnormal Patterns (Gait) gait speed decreased;festinating/shuffling;weight shifting decreased  -CT     Bilateral Gait Deviations weight shift ability decreased;forward flexed posture;knee buckling bilaterally  -CT     Row Name 10/26/22 1255          Balance    Static Sitting Balance contact guard;minimal assist;verbal cues  -CT     Position, Sitting Balance sitting edge of bed  -CT     Row Name 10/26/22 1255          Motor Skills    Therapeutic Exercise --  BLE seated ther ex; LAQ, AP, seated marches, Glut sets, hamstring curl red TB, hip abb/add 15 resps  -CT     Row Name 10/26/22 1255          Coping    Observed  Emotional State calm;cooperative  -CT     Verbalized Emotional State acceptance  -CT     Row Name 10/26/22 1255          Plan of Care Review    Plan of Care Reviewed With patient;son  -CT     Row Name 10/26/22 1255          Positioning and Restraints    Pre-Treatment Position sitting in chair/recliner  -CT     Post Treatment Position bed  -CT     In Bed supine;call light within reach;encouraged to call for assist;exit alarm on;side rails up x3  -CT           User Key  (r) = Recorded By, (t) = Taken By, (c) = Cosigned By    Initials Name Provider Type    CT Bhargavi Vásquez, PT Physical Therapist                  PT Recommendation and Plan  Anticipated Discharge Disposition (PT): home with /7 [facility], [facility]  Planned Therapy Interventions (PT): balance training, bed mobility training, gait training, home exercise program, motor coordination training, manual therapy techniques, neuromuscular re-education, postural re-education, patient/family education, strengthening, transfer training  Therapy Frequency (PT): 5 times/wk (5-6 times/wk)  Progress Summary (PT)  Progress Toward Functional Goals (PT): progress toward functional goals is fair  Plan of Care Reviewed With: patient, son  Progress: no change   Outcome Measures     Row Name 10/26/22 1428 10/25/22 1359          How much help from another is currently needed...    Putting on and taking off regular lower body clothing? 2  -LM 2  -LM     Bathing (including washing, rinsing, and drying) 2  -LM 2  -LM     Toileting (which includes using toilet bed pan or urinal) 2  -LM 2  -LM     Putting on and taking off regular upper body clothing 3  -LM 3  -LM     Taking care of personal grooming (such as brushing teeth) 3  -LM 3  -LM     Eating meals 3  -LM 3  -LM     AM-PAC 6 Clicks Score (OT) 15  -LM 15  -LM        Functional Assessment    Outcome Measure Options AM-PAC 6 Clicks Daily Activity (OT)  -LM AM-PAC 6 Clicks Daily Activity (OT)  -LM           User Key   (r) = Recorded By, (t) = Taken By, (c) = Cosigned By    Initials Name Provider Type    LM Татьяна Bah, OT Occupational Therapist                 Time Calculation:    PT Charges     Row Name 10/26/22 1445             Time Calculation    Start Time 1255  -CT      Stop Time 1333  -CT      Time Calculation (min) 38 min  -CT      PT Received On 10/26/22  -CT         Time Calculation- PT    Total Timed Code Minutes- PT 23 minute(s)  -CT            User Key  (r) = Recorded By, (t) = Taken By, (c) = Cosigned By    Initials Name Provider Type    CT Bhargavi Vásquez, PT Physical Therapist              Therapy Charges for Today     Code Description Service Date Service Provider Modifiers Qty    54120272770 HC GAIT TRAINING EA 15 MIN 10/25/2022 Bhargavi Vásquez, PT GP 1    47918739091 HC PT THER PROC EA 15 MIN 10/25/2022 Bhargavi Vásquez, PT GP 1    65385533260 HC GAIT TRAINING EA 15 MIN 10/26/2022 Bhargavi Vásquez, PT GP 1    06971483094 HC PT THER PROC EA 15 MIN 10/26/2022 Bhargavi Vásquez, PT GP 1          PT G-Codes  Outcome Measure Options: AM-PAC 6 Clicks Daily Activity (OT)  AM-PAC 6 Clicks Score (PT): 12  AM-PAC 6 Clicks Score (OT): 15    Bhargavi Vásquez PT  10/26/2022

## 2022-10-26 NOTE — PROGRESS NOTES
Pt admitted to swing bed on 10/5 for additional therapy in the setting of generalized weakness/debility. Pt not seen or examined today. I have reviewed her VS. I have discussed her care with SONAM Tejeda who does not report any acute events or issues that need to be addressed. BP slightly elevated this AM but otherwise stable today. Cont current medication regimen. Pharmacy has evaluated cost of DOAC's for treatment of DVT. Pt states she will be unable to afford copay for Eliquis and pharmacy asked for tier exception through insurance to attempt to lower copay as risk of warfarin thought to outweigh benefits. However, this is the lowest tier on pt's insurance. Pt to bring in financial information after discharge to apply for  assistance program and pharmacy to provide 30 day free trial card at discharge. Repeat CBC in the AM. If H&H remains stable, will transition to Eliquis tomorrow. Appeal for additional swing bed days has been denied and pt's family to file a grievance with insurance company. Repeat labs in the AM.       Mustapha Loredo DO  10/26/22  15:52 EDT

## 2022-10-26 NOTE — CASE MANAGEMENT/SOCIAL WORK
Discharge Planning Assessment  [facility]     Patient Name: Tre Kinney  MRN: [id number]  Today's Date: 10/26/2022    Admit Date: 10/5/2022       Discharge Plan     Row Name 10/26/22 1135       Plan    Plan SS discussed pt with swing bed RN and appeal was denied. Pt's son, Leno has been notified and pt's family plans to file a grievance with [place] Medicare Replacement per swing bed RN. SS to follow.              Continued Care and Services - Admitted Since 10/5/2022     Destination     Service Provider Request Status Selected Services Address Phone Fax Patient Preferred    [facility] Accepted N/A [address] [place] [id number] [phone] --    [facility] Pending - Request Sent N/A [address] [place] [id number] [phone] --    [facility] Declined  Does not have a secondary payor source N/A [address] [facility] [phone] --            Selected Continued Care - Prior Encounters Includes continued care and service providers with selected services from prior encounters from 7/7/2022 to 10/26/2022    Discharged on 10/5/2022 Admission date: 9/19/2022 - Discharge disposition: Swing Bed    Destination     Service Provider Selected Services Address Phone Fax Patient Preferred    [facility] [address] [facility] [place] [address] [phone] -- --                    LENORE Koch

## 2022-10-26 NOTE — THERAPY TREATMENT NOTE
Acute Care - Occupational Therapy Treatment Note   [facility]     Patient Name: Tre Kinney  : 1932  MRN: [id number]  Today's Date: 10/26/2022  Onset of Illness/Injury or Date of Surgery: 10/06/22     Referring Physician: Janneth    Admit Date: 10/5/2022     No diagnosis found.  Patient Active Problem List   Diagnosis   • Acid reflux   • Ankle arthralgia   • Cardiac conduction disorder   • Closed fracture of distal fibula   • Arteriosclerosis of coronary artery   • Fracture of distal end of tibia   • Elevated cholesterol   • BP (high blood pressure)   • Hypertrophic polyarthritis   • OP (osteoporosis)   • Right heart failure (HCC)   • Heart failure, chronic, right-sided (HCC)   • SOB (shortness of breath)   • Palpitations   • CHF (congestive heart failure) (HCC)   • Congestive heart failure (HCC)   • Precordial pain   • Chronic anticoagulation   • Hypokalemia   • Anemia, unspecified type   • Generalized weakness     Past Medical History:   Diagnosis Date   • Anemia    • Arthritis    • CHF (congestive heart failure) ([facility])    • Chronic kidney disease    • Coronary artery disease    • H/O blood clots    • History of transfusion     no reaction    • Hypertension      Past Surgical History:   Procedure Laterality Date   • ABDOMINAL SURGERY     • APPENDECTOMY     • CARDIAC SURGERY     •  SECTION     • COLONOSCOPY N/A 3/7/2018    Procedure: COLONOSCOPY;  Surgeon: Willie Gresham MD;  Location:  [facility];  Service:    • CORONARY ANGIOPLASTY WITH STENT PLACEMENT     • ENDOSCOPY N/A 3/6/2018    Procedure: ESOPHAGOGASTRODUODENOSCOPY;  Surgeon: Willie Gresham MD;  Location:  [facility];  Service:    • PACEMAKER IMPLANTATION      left side    • SKIN BIOPSY     • TUBAL ABDOMINAL LIGATION           OT ASSESSMENT FLOWSHEET (last 12 hours)     OT Evaluation and Treatment     Row Name 10/26/22 1417                   OT Time and Intention    Subjective Information complains of;weakness;fatigue;pain  -LM         Document Type therapy note (daily note)  -LM        Mode of Treatment occupational therapy  -LM        Patient Effort adequate  -LM        Symptoms Noted During/After Treatment fatigue  -LM        Comment Patient seen this date from 12:55 to 13:33 with PT to assist with fxl mobility following BUE arom/therex.  Patient son, Leno, present during tx session and very supportive.  Patient completed BUE arom therex for bue shoulder flex/ext, elbow flex/ext, and hor abd/add with min cues.  Recommended 3 sets of 10 reps with each specific therex written on communication board.  Patient's son present and verbalized understanding.  Frequent rest breaks recommended with bue arom and/or tband therex.   Assisted PT with sit to stand, fxl transfer and sitting balance.  Again, patient was incontinent of bowel and required total assist from PCA  while standing with RW and max/total assist x 2 person assist.  Max/total assist x 2 person using rw to transfer from chair to bed.  CGA/supervision with min cues for sitting balance on eob due to posterior lean.  Did discuss progress with son following treatment and that Lee lift may be option for fxl transfers if progress plateaus.  -LM           General Information    Existing Precautions/Restrictions fall  -LM           Cognition    Affect/Mental Status (Cognition) WFL  -LM        Orientation Status (Cognition) oriented x 3  -LM           Positioning and Restraints    Post Treatment Position bed  -LM        In Bed call light within reach;encouraged to call for assist  -LM              User Key  (r) = Recorded By, (t) = Taken By, (c) = Cosigned By    Initials Name Effective Dates    LM Татьяна Bah, OT 06/16/21 -                        OT Recommendation and Plan           Outcome Measures     Row Name 10/26/22 0227 10/25/22 9537          How much help from another is currently needed...    Putting on and taking off regular lower body clothing? 2  -LM 2  -LM     Bathing  (including washing, rinsing, and drying) 2  -LM 2  -LM     Toileting (which includes using toilet bed pan or urinal) 2  -LM 2  -LM     Putting on and taking off regular upper body clothing 3  -LM 3  -LM     Taking care of personal grooming (such as brushing teeth) 3  -LM 3  -LM     Eating meals 3  -LM 3  -LM     AM-PAC 6 Clicks Score (OT) 15  -LM 15  -LM        Functional Assessment    Outcome Measure Options AM-PAC 6 Clicks Daily Activity (OT)  -LM AM-PAC 6 Clicks Daily Activity (OT)  -LM           User Key  (r) = Recorded By, (t) = Taken By, (c) = Cosigned By    Initials Name Provider Type    Татьяна Mccabe, OT Occupational Therapist                Time Calculation:    Time Calculation- OT     Row Name 10/26/22 1428             Time Calculation- OT    Total Timed Code Minutes- OT 15 minute(s)  -LM            User Key  (r) = Recorded By, (t) = Taken By, (c) = Cosigned By    Initials Name Provider Type    Татьяна Mccabe, OT Occupational Therapist              Therapy Charges for Today     Code Description Service Date Service Provider Modifiers Qty    53213257222  OT SELF CARE/MGMT/TRAIN EA 15 MIN 10/25/2022 Татьяна Bah OT GO 1    [id number]  OT THERAPEUTIC ACT EA 15 MIN 10/26/2022 Татьяна Bah OT GO 1               Татьяна Bah OT  10/26/2022

## 2022-10-26 NOTE — PLAN OF CARE
Goal Outcome Evaluation:  Plan of Care Reviewed With: patient           Outcome Evaluation: Pt has sat up in chair for 1 hr this shift. Pt rested comfortably in bed throughout shift. No acute changes. Pt has no new complaints or concerns. VSS.

## 2022-10-26 NOTE — CASE MANAGEMENT/SOCIAL WORK
Follow up call with patients Ryan khalil and myself to discuss the details of the [facility] and appeal, a time will be arranged for a POC meeting will be arranged to discuss the patients progress and recommendations for the patients post SNF care. Phone number provided to family to file a grievance with the patients insurance provider related to the appeal denial.

## 2022-10-27 LAB
ALBUMIN SERPL-MCNC: 2.63 G/DL (ref 3.5–5.2)
ALBUMIN/GLOB SERPL: 0.9 G/DL
ALP SERPL-CCNC: 49 U/L (ref 39–117)
ALT SERPL W P-5'-P-CCNC: 5 U/L (ref 1–33)
ANION GAP SERPL CALCULATED.3IONS-SCNC: 9.5 MMOL/L (ref 5–15)
AST SERPL-CCNC: 10 U/L (ref 1–32)
BASOPHILS # BLD AUTO: 0.05 10*3/MM3 (ref 0–0.2)
BASOPHILS NFR BLD AUTO: 0.6 % (ref 0–1.5)
BILIRUB SERPL-MCNC: 0.3 MG/DL (ref 0–1.2)
BUN SERPL-MCNC: 28 MG/DL (ref 8–23)
BUN/CREAT SERPL: 14.1 (ref 7–25)
CALCIUM SPEC-SCNC: 8.4 MG/DL (ref 8.6–10.5)
CHLORIDE SERPL-SCNC: 102 MMOL/L (ref 98–107)
CO2 SERPL-SCNC: 25.5 MMOL/L (ref 22–29)
CREAT SERPL-MCNC: 1.99 MG/DL (ref 0.57–1)
DEPRECATED RDW RBC AUTO: 60.6 FL (ref 37–54)
EGFRCR SERPLBLD CKD-EPI 2021: 23.6 ML/MIN/1.73
EOSINOPHIL # BLD AUTO: 0.62 10*3/MM3 (ref 0–0.4)
EOSINOPHIL NFR BLD AUTO: 7.9 % (ref 0.3–6.2)
ERYTHROCYTE [DISTWIDTH] IN BLOOD BY AUTOMATED COUNT: 17 % (ref 12.3–15.4)
GLOBULIN UR ELPH-MCNC: 2.8 GM/DL
GLUCOSE SERPL-MCNC: 101 MG/DL (ref 65–99)
HCT VFR BLD AUTO: 30.6 % (ref 34–46.6)
HGB BLD-MCNC: 9.7 G/DL (ref 12–15.9)
IMM GRANULOCYTES # BLD AUTO: 0.04 10*3/MM3 (ref 0–0.05)
IMM GRANULOCYTES NFR BLD AUTO: 0.5 % (ref 0–0.5)
LYMPHOCYTES # BLD AUTO: 1.38 10*3/MM3 (ref 0.7–3.1)
LYMPHOCYTES NFR BLD AUTO: 17.6 % (ref 19.6–45.3)
MCH RBC QN AUTO: 30.7 PG (ref 26.6–33)
MCHC RBC AUTO-ENTMCNC: 31.7 G/DL (ref 31.5–35.7)
MCV RBC AUTO: 96.8 FL (ref 79–97)
MONOCYTES # BLD AUTO: 0.65 10*3/MM3 (ref 0.1–0.9)
MONOCYTES NFR BLD AUTO: 8.3 % (ref 5–12)
NEUTROPHILS NFR BLD AUTO: 5.12 10*3/MM3 (ref 1.7–7)
NEUTROPHILS NFR BLD AUTO: 65.1 % (ref 42.7–76)
NRBC BLD AUTO-RTO: 0 /100 WBC (ref 0–0.2)
PLATELET # BLD AUTO: 202 10*3/MM3 (ref 140–450)
PMV BLD AUTO: 9.5 FL (ref 6–12)
POTASSIUM SERPL-SCNC: 3.5 MMOL/L (ref 3.5–5.2)
POTASSIUM SERPL-SCNC: 5.4 MMOL/L (ref 3.5–5.2)
PROT SERPL-MCNC: 5.4 G/DL (ref 6–8.5)
RBC # BLD AUTO: 3.16 10*6/MM3 (ref 3.77–5.28)
SODIUM SERPL-SCNC: 137 MMOL/L (ref 136–145)
WBC NRBC COR # BLD: 7.86 10*3/MM3 (ref 3.4–10.8)

## 2022-10-27 PROCEDURE — 97110 THERAPEUTIC EXERCISES: CPT

## 2022-10-27 PROCEDURE — 80053 COMPREHEN METABOLIC PANEL: CPT | Performed by: INTERNAL MEDICINE

## 2022-10-27 PROCEDURE — 85025 COMPLETE CBC W/AUTO DIFF WBC: CPT | Performed by: INTERNAL MEDICINE

## 2022-10-27 PROCEDURE — 97530 THERAPEUTIC ACTIVITIES: CPT

## 2022-10-27 PROCEDURE — 97116 GAIT TRAINING THERAPY: CPT

## 2022-10-27 PROCEDURE — 97535 SELF CARE MNGMENT TRAINING: CPT

## 2022-10-27 PROCEDURE — 84132 ASSAY OF SERUM POTASSIUM: CPT | Performed by: INTERNAL MEDICINE

## 2022-10-27 RX ORDER — POTASSIUM CHLORIDE 20 MEQ/1
40 TABLET, EXTENDED RELEASE ORAL EVERY 4 HOURS
Status: COMPLETED | OUTPATIENT
Start: 2022-10-27 | End: 2022-10-27

## 2022-10-27 RX ADMIN — HYDRALAZINE HYDROCHLORIDE 5 MG: 25 TABLET, FILM COATED ORAL at 15:55

## 2022-10-27 RX ADMIN — HYDRALAZINE HYDROCHLORIDE 5 MG: 25 TABLET, FILM COATED ORAL at 21:05

## 2022-10-27 RX ADMIN — GABAPENTIN 300 MG: 300 CAPSULE ORAL at 20:59

## 2022-10-27 RX ADMIN — PANTOPRAZOLE SODIUM 40 MG: 40 TABLET, DELAYED RELEASE ORAL at 06:17

## 2022-10-27 RX ADMIN — Medication 1 TABLET: at 09:41

## 2022-10-27 RX ADMIN — FUROSEMIDE 40 MG: 40 TABLET ORAL at 09:40

## 2022-10-27 RX ADMIN — APIXABAN 5 MG: 5 TABLET, FILM COATED ORAL at 20:59

## 2022-10-27 RX ADMIN — Medication 10 ML: at 09:52

## 2022-10-27 RX ADMIN — Medication 10 ML: at 21:00

## 2022-10-27 RX ADMIN — DOCUSATE SODIUM 100 MG: 100 CAPSULE ORAL at 09:44

## 2022-10-27 RX ADMIN — ESCITALOPRAM 10 MG: 10 TABLET, FILM COATED ORAL at 09:40

## 2022-10-27 RX ADMIN — METOPROLOL TARTRATE 12.5 MG: 25 TABLET, FILM COATED ORAL at 09:41

## 2022-10-27 RX ADMIN — Medication 10 ML: at 09:51

## 2022-10-27 RX ADMIN — Medication 10 MG: at 20:59

## 2022-10-27 RX ADMIN — HYDRALAZINE HYDROCHLORIDE 5 MG: 25 TABLET, FILM COATED ORAL at 06:17

## 2022-10-27 RX ADMIN — METOPROLOL TARTRATE 12.5 MG: 25 TABLET, FILM COATED ORAL at 20:59

## 2022-10-27 RX ADMIN — POTASSIUM CHLORIDE 40 MEQ: 20 TABLET, EXTENDED RELEASE ORAL at 12:02

## 2022-10-27 RX ADMIN — FUROSEMIDE 40 MG: 40 TABLET ORAL at 15:56

## 2022-10-27 RX ADMIN — Medication 10 ML: at 21:12

## 2022-10-27 RX ADMIN — DOCUSATE SODIUM 100 MG: 100 CAPSULE ORAL at 20:59

## 2022-10-27 RX ADMIN — POTASSIUM CHLORIDE 40 MEQ: 20 TABLET, EXTENDED RELEASE ORAL at 09:43

## 2022-10-27 RX ADMIN — TAMSULOSIN HYDROCHLORIDE 0.4 MG: 0.4 CAPSULE ORAL at 09:40

## 2022-10-27 NOTE — PLAN OF CARE
Goal Outcome Evaluation:  Plan of Care Reviewed With: patient        Progress: improving    Pt was up in recliner x1 this shift. K+ = 3.5, replacement ordered. No acute changes, will continue to follow current POC.

## 2022-10-27 NOTE — PLAN OF CARE
Goal Outcome Evaluation:  Plan of Care Reviewed With: patient        Progress: improving  Outcome Evaluation: Up in chair several hours this morning max assist x 2.  Tolerated well.  Continue to monitor.

## 2022-10-27 NOTE — THERAPY TREATMENT NOTE
Acute Care - Physical Therapy Treatment Note   [facility]     Patient Name: Tre Kinney  : 1932  MRN: [id number]  Today's Date: 10/27/2022   Onset of Illness/Injury or Date of Surgery: 10/06/22  Visit Dx:   No diagnosis found.  Patient Active Problem List   Diagnosis   • Acid reflux   • Ankle arthralgia   • Cardiac conduction disorder   • Closed fracture of distal fibula   • Arteriosclerosis of coronary artery   • Fracture of distal end of tibia   • Elevated cholesterol   • BP (high blood pressure)   • Hypertrophic polyarthritis   • OP (osteoporosis)   • Right heart failure (HCC)   • Heart failure, chronic, right-sided (HCC)   • SOB (shortness of breath)   • Palpitations   • CHF (congestive heart failure) (HCC)   • Congestive heart failure (HCC)   • Precordial pain   • Chronic anticoagulation   • Hypokalemia   • Anemia, unspecified type   • Generalized weakness     Past Medical History:   Diagnosis Date   • Anemia    • Arthritis    • CHF (congestive heart failure) (HCC)    • Chronic kidney disease    • Coronary artery disease    • H/O blood clots    • History of transfusion     no reaction    • Hypertension      Past Surgical History:   Procedure Laterality Date   • ABDOMINAL SURGERY     • APPENDECTOMY     • CARDIAC SURGERY     •  SECTION     • COLONOSCOPY N/A 3/7/2018    Procedure: COLONOSCOPY;  Surgeon: Willie Gresham MD;  Location:  [facility];  Service:    • CORONARY ANGIOPLASTY WITH STENT PLACEMENT     • ENDOSCOPY N/A 3/6/2018    Procedure: ESOPHAGOGASTRODUODENOSCOPY;  Surgeon: Willie Gresham MD;  Location:  [facility];  Service:    • PACEMAKER IMPLANTATION      left side    • SKIN BIOPSY     • TUBAL ABDOMINAL LIGATION       PT Assessment (last 12 hours)     PT Evaluation and Treatment     Row Name 10/27/22 [facility]          Physical Therapy Time and Intention    Subjective Information complains of;weakness;fatigue  -CT     Document Type therapy note (daily note)  -CT     Mode of  Treatment physical therapy  -CT     Patient Effort fair  -CT     Comment Pt treatment session performed from [id number] with Son Leno present. OT present during part of treatment session to assist with transfer and ambulation. Pt performed bed mobility while being cleaned after having a bowl movement. Pt then ambulated 5 ft forward and back to chair. Pt continues to try to sit early during transfer and requires constant cueing to remain in upright standing throughout ambulation and transfer. Pt expressed fatigue requiring therapist and family to encourage her to participate with therapeutic exercise.  -CT     Row Name 10/27/22 1257          General Information    Patient Profile Reviewed yes  -CT     Existing Precautions/Restrictions fall;other (see comments);TLSO  swan; pt continues to decline use of TLSO  -CT     Row Name 10/27/22 1257          Cognition    Orientation Status (Cognition) oriented to;person;place;verbal cues/prompts needed for orientation;time  -CT     Follows Commands (Cognition) WFL  -CT     Row Name 10/27/22 1257          Bed Mobility    Rolling Left [place] (Bed Mobility) minimum assist (75% patient effort);verbal cues;nonverbal cues (demo/gesture)  -CT     Rolling Right [place] (Bed Mobility) minimum assist (75% patient effort);verbal cues;nonverbal cues (demo/gesture)  -CT     Scooting/Bridging [place] (Bed Mobility) moderate assist (50% patient effort);1 person to manage equipment;verbal cues;nonverbal cues (demo/gesture)  -CT     Supine-Sit [place] (Bed Mobility) moderate assist (50% patient effort);1 person assist;verbal cues;nonverbal cues (demo/gesture)  -CT     Bed Mobility, Safety Issues decreased use of legs for bridging/pushing;decreased use of arms for pushing/pulling;impaired trunk control for bed mobility  log roll  -CT     Assistive Device (Bed Mobility) bed rails;draw sheet  -CT     Row Name 10/27/22 1257          Bed-Chair Transfer    Bed-Chair [place]  (Transfers) maximum assist (25% patient effort);2 person assist;verbal cues;nonverbal cues (demo/gesture)  -CT     Assistive Device (Bed-Chair Transfers) walker, front-wheeled  -CT     Comment, (Bed-Chair Transfer) constant cueing to remain in upright standing  -CT     Row Name 10/27/22 1257          Sit-Stand Transfer    Sit-Stand Mayes (Transfers) maximum assist (25% patient effort);2 person assist;verbal cues;nonverbal cues (demo/gesture)  -CT     Assistive Device (Sit-Stand Transfers) walker, front-wheeled  -CT     Row Name 10/27/22 1257          Stand-Sit Transfer    Stand-Sit Mayes (Transfers) maximum assist (25% patient effort);2 person assist;verbal cues;nonverbal cues (demo/gesture)  pt sits before being all the way back to chair  -CT     Assistive Device (Stand-Sit Transfers) walker, front-wheeled  -CT     Row Name 10/27/22 1257          Gait/Stairs (Locomotion)    Mayes Level (Gait) maximum assist (25% patient effort);2 person assist;verbal cues;nonverbal cues (demo/gesture)  -CT     Assistive Device (Gait) walker, front-wheeled  -CT     Distance in Feet (Gait) 5 ft foward and then backward to chair  -CT     Pattern (Gait) step-to  -CT     Deviations/Abnormal Patterns (Gait) gait speed decreased;festinating/shuffling;weight shifting decreased  -CT     Bilateral Gait Deviations weight shift ability decreased;forward flexed posture;knee buckling bilaterally  -CT     Comment, (Gait/Stairs) constant cueing to remain in upright standing  -CT     Row Name 10/27/22 1257          Motor Skills    Therapeutic Exercise --  seated ther ex; LAQ, AP, seated marches, hip abd/add, hip abd/add restisted w/ red TB, hamstring curls red TB, glut sets 20 reps  -CT     Row Name 10/27/22 1257          Coping    Observed Emotional State calm;cooperative  -CT     Verbalized Emotional State acceptance  -CT     Row Name 10/27/22 1257          Plan of Care Review    Plan of Care Reviewed With patient  -CT      Row Name 10/27/22 1257          Positioning and Restraints    Pre-Treatment Position in bed  -CT     Post Treatment Position chair  -CT     In Chair sitting;call light within reach;encouraged to call for assist;with OT;with family/caregiver  -CT           User Key  (r) = Recorded By, (t) = Taken By, (c) = Cosigned By    Initials Name Provider Type    CT Bhargavi Vásquez, PT Physical Therapist                  PT Recommendation and Plan  Anticipated Discharge Disposition (PT): home with /7 [facility], [facility] nursing [facility]  Planned Therapy Interventions (PT): balance training, bed mobility training, gait training, home exercise program, motor coordination training, manual therapy techniques, neuromuscular re-education, postural re-education, patient/family education, strengthening, transfer training  Therapy Frequency (PT): 5 times/wk (5-6 times/wk)  Progress Summary (PT)  Progress Toward Functional Goals (PT): progress toward functional goals is fair  Plan of Care Reviewed With: patient  Progress: no change   Outcome Measures     Row Name 10/27/22 1448 10/26/22 1428 10/25/22 1359       How much help from another is currently needed...    Putting on and taking off regular lower body clothing? 2  -LM 2  -LM 2  -LM    Bathing (including washing, rinsing, and drying) 2  -LM 2  -LM 2  -LM    Toileting (which includes using toilet bed pan or urinal) 2  -LM 2  -LM 2  -LM    Putting on and taking off regular upper body clothing 3  -LM 3  -LM 3  -LM    Taking care of personal grooming (such as brushing teeth) 3  -LM 3  -LM 3  -LM    Eating meals 3  -LM 3  -LM 3  -LM    AM-PAC 6 Clicks Score (OT) 15  -LM 15  -LM 15  -LM       Functional Assessment    Outcome Measure Options AM-PAC 6 Clicks Daily Activity (OT)  -LM AM-PAC 6 Clicks Daily Activity (OT)  -LM AM-PAC 6 Clicks Daily Activity (OT)  -LM          User Key  (r) = Recorded By, (t) = Taken By, (c) = Cosigned By    Initials Name Provider Type    LM Татьяна Bah, OT  Occupational Therapist                 Time Calculation:    PT Charges     Row Name 10/27/22 1454             Time Calculation    Start Time 1257  -CT      Stop Time 1335  -CT      Time Calculation (min) 38 min  -CT      PT Received On 10/27/22  -CT            User Key  (r) = Recorded By, (t) = Taken By, (c) = Cosigned By    Initials Name Provider Type    CT Bhargavi Vásquez, PT Physical Therapist              Therapy Charges for Today     Code Description Service Date Service Provider Modifiers Qty    31484651906 HC GAIT TRAINING EA 15 MIN 10/26/2022 Bhargavi Vásquez, PT GP 1    25816769574 HC PT THER PROC EA 15 MIN 10/26/2022 Bhargavi Vásquez, PT GP 1    85397334765 HC GAIT TRAINING EA 15 MIN 10/27/2022 Bhargavi Vásquez, PT GP 1    07819254081 HC PT THER PROC EA 15 MIN 10/27/2022 Bhargavi Vásquez, PT GP 1    76820175362 HC PT THERAPEUTIC ACT EA 15 MIN 10/27/2022 Bhargavi Vásquez, PT GP 1          PT G-Codes  Outcome Measure Options: AM-PAC 6 Clicks Daily Activity (OT)  AM-PAC 6 Clicks Score (PT): 11  AM-PAC 6 Clicks Score (OT): 15    Bhargavi Vásquez PT  10/27/2022

## 2022-10-27 NOTE — PROGRESS NOTES
Nutrition Services    Patient Name:  Tre Kinney  YOB: 1932  MRN: [id number]  Admit Date:  10/5/2022    BMI: 25.41  Diet: Soft Texture, Chopped, thin liquids  Supplement: Magic Cup TID  Intake: 72% avg over 9 meals.  Improved from last week  Fluids: not meeting needs    Electronically signed by:  Jose Oneal RD  10/27/22 15:33 EDT

## 2022-10-27 NOTE — CASE MANAGEMENT/SOCIAL WORK
Discharge Planning Assessment  [facility]     Patient Name: Tre Kinney  MRN: [id number]  Today's Date: 10/27/2022    Admit Date: 10/5/2022    Plan: Pt's family has filed a grievance with [employer] and is currently pending. SS to follow.      Discharge Plan     Row Name 10/27/22 1421       Plan    Plan Pt's family has filed a grievance with [employer] and is currently pending. SS to follow.              Continued Care and Services - Admitted Since 10/5/2022     Destination     Service Provider Request Status Selected Services Address Phone Fax Patient Preferred    [facility] Accepted N/A [address] [place] [address] [phone] --    [facility] Pending - Request Sent N/A [address] [place] [address] [phone] --    [facility] Declined  Does not have a secondary payor source N/A [address] [facility] [place] [address] [phone] --            Selected Continued Care - Prior Encounters Includes continued care and service providers with selected services from prior encounters from 7/7/2022 to 10/27/2022    Discharged on 10/5/2022 Admission date: 9/19/2022 - Discharge disposition: Swing Bed    Destination     Service Provider Selected Services Address Phone Fax Patient Preferred    [facility] [address] [place] [address] [phone] -- --                        XANDER Wills

## 2022-10-27 NOTE — THERAPY TREATMENT NOTE
Acute Care - Occupational Therapy Treatment Note   [facility]     Patient Name: Tre Kinney  : 1932  MRN: [id number]  Today's Date: 10/27/2022  Onset of Illness/Injury or Date of Surgery: 10/06/22     Referring Physician: Janneth    Admit Date: 10/5/2022     No diagnosis found.  Patient Active Problem List   Diagnosis   • Acid reflux   • Ankle arthralgia   • Cardiac conduction disorder   • Closed fracture of distal fibula   • Arteriosclerosis of coronary artery   • Fracture of distal end of tibia   • Elevated cholesterol   • BP (high blood pressure)   • Hypertrophic polyarthritis   • OP (osteoporosis)   • Right heart failure (HCC)   • Heart failure, chronic, right-sided (HCC)   • SOB (shortness of breath)   • Palpitations   • CHF (congestive heart failure) (HCC)   • Congestive heart failure (HCC)   • Precordial pain   • Chronic anticoagulation   • Hypokalemia   • Anemia, unspecified type   • Generalized weakness     Past Medical History:   Diagnosis Date   • Anemia    • Arthritis    • CHF (congestive heart failure) ([facility])    • Chronic kidney disease    • Coronary artery disease    • H/O blood clots    • History of transfusion     no reaction    • Hypertension      Past Surgical History:   Procedure Laterality Date   • ABDOMINAL SURGERY     • APPENDECTOMY     • CARDIAC SURGERY     •  SECTION     • COLONOSCOPY N/A 3/7/2018    Procedure: COLONOSCOPY;  Surgeon: Willie Gresham MD;  Location:  [facility];  Service:    • CORONARY ANGIOPLASTY WITH STENT PLACEMENT     • ENDOSCOPY N/A 3/6/2018    Procedure: ESOPHAGOGASTRODUODENOSCOPY;  Surgeon: Willie Gresham MD;  Location:  [facility];  Service:    • PACEMAKER IMPLANTATION      left side    • SKIN BIOPSY     • TUBAL ABDOMINAL LIGATION           OT ASSESSMENT FLOWSHEET (last 12 hours)     OT Evaluation and Treatment     Row Name 10/27/22 1443                   OT Time and Intention    Subjective Information complains of;weakness;fatigue;pain  -LM         Document Type therapy note (daily note)  -LM        Mode of Treatment occupational therapy  -LM        Patient Effort adequate  -LM        Comment Patient seen this date for bue arom therex and adl retraining/education/fxl mobility.  Patient seen from 13:20-13:50.  First part of session was assisting PT with fxl transfers to bedside chair from  using rw.  required moderate/min assist x 2 for safe transfer and min cues.  Patient participated in bue arom therex while seated in bedside chair.  Performed yellow tband therex, light towel therex 3 sets of 10 reps each of shoulder flex/ext, hor abd/add, and elbow flex/ext.  Frequent rest breaks.  SonLeno was present entire tx session and supportive.  -LM           General Information    Existing Precautions/Restrictions fall  -LM           Cognition    Affect/Mental Status (Cognition) WFL  -LM        Orientation Status (Cognition) oriented x 3  -LM           Positioning and Restraints    Pre-Treatment Position in bed  -LM        Post Treatment Position chair  -LM        In Chair call light within reach;encouraged to call for assist;with family/caregiver  -LM              User Key  (r) = Recorded By, (t) = Taken By, (c) = Cosigned By    Initials Name Effective Dates    LM Татьяна Bah, OT 06/16/21 -                        OT Recommendation and Plan           Outcome Measures     Row Name 10/27/22 1448 10/26/22 1428 10/25/22 1359       How much help from another is currently needed...    Putting on and taking off regular lower body clothing? 2  -LM 2  -LM 2  -LM    Bathing (including washing, rinsing, and drying) 2  -LM 2  -LM 2  -LM    Toileting (which includes using toilet bed pan or urinal) 2  -LM 2  -LM 2  -LM    Putting on and taking off regular upper body clothing 3  -LM 3  -LM 3  -LM    Taking care of personal grooming (such as brushing teeth) 3  -LM 3  -LM 3  -LM    Eating meals 3  -LM 3  -LM 3  -LM    AM-PAC 6 Clicks Score (OT) 15  -LM 15  -LM 15  -LM        Functional Assessment    Outcome Measure Options AM-PAC 6 Clicks Daily Activity (OT)  -LM AM-PAC 6 Clicks Daily Activity (OT)  -LM AM-PAC 6 Clicks Daily Activity (OT)  -LM          User Key  (r) = Recorded By, (t) = Taken By, (c) = Cosigned By    Initials Name Provider Type    Татьяна Mccabe, OT Occupational Therapist                Time Calculation:    Time Calculation- OT     Row Name 10/27/22 1448             Time Calculation- OT    Total Timed Code Minutes- OT 30 minute(s)  -            User Key  (r) = Recorded By, (t) = Taken By, (c) = Cosigned By    Initials Name Provider Type    Татьяна Mccabe, OT Occupational Therapist              Therapy Charges for Today     Code Description Service Date Service Provider Modifiers Qty    54215695647  OT THERAPEUTIC ACT EA 15 MIN 10/26/2022 Татьяна Bah OT GO 1    [id number]  OT THER PROC EA 15 MIN 10/27/2022 Татьяна Bah OT GO 1    [id number]  OT SELF CARE/MGMT/TRAIN EA 15 MIN 10/27/2022 Татьяна Bah OT GO 1               Татьяна Bah OT  10/27/2022

## 2022-10-27 NOTE — PROGRESS NOTES
Pt admitted to swing bed on 10/5 for additional therapy in the setting of generalized weakness/debility. Pt not seen or examined today. I have reviewed her VS. I have discussed her care with SONAM Forrester who does not report any acute events or issues that need to be addressed. BP stable today. Cont current antihypertensive regimen. K+ low normal and replaced per protocol. Repeat H&H stable today. Discussed with pharmacy and transitioned Lovenox to Eliquis. Appeal for additional swing bed days has been denied and pt's family has filed a grievance with insurance company. Cont PT/OT.       Mustapha Loredo,   10/27/22  15:52 EDT

## 2022-10-28 LAB — POTASSIUM SERPL-SCNC: 4.1 MMOL/L (ref 3.5–5.2)

## 2022-10-28 PROCEDURE — 97530 THERAPEUTIC ACTIVITIES: CPT

## 2022-10-28 PROCEDURE — 97110 THERAPEUTIC EXERCISES: CPT

## 2022-10-28 PROCEDURE — 84132 ASSAY OF SERUM POTASSIUM: CPT | Performed by: INTERNAL MEDICINE

## 2022-10-28 RX ADMIN — Medication 1 TABLET: at 09:21

## 2022-10-28 RX ADMIN — FUROSEMIDE 40 MG: 40 TABLET ORAL at 09:21

## 2022-10-28 RX ADMIN — Medication 10 ML: at 21:09

## 2022-10-28 RX ADMIN — Medication 10 ML: at 09:23

## 2022-10-28 RX ADMIN — DOCUSATE SODIUM 100 MG: 100 CAPSULE ORAL at 09:21

## 2022-10-28 RX ADMIN — GABAPENTIN 300 MG: 300 CAPSULE ORAL at 21:10

## 2022-10-28 RX ADMIN — TAMSULOSIN HYDROCHLORIDE 0.4 MG: 0.4 CAPSULE ORAL at 09:21

## 2022-10-28 RX ADMIN — ESCITALOPRAM 10 MG: 10 TABLET, FILM COATED ORAL at 09:21

## 2022-10-28 RX ADMIN — HYDRALAZINE HYDROCHLORIDE 5 MG: 25 TABLET, FILM COATED ORAL at 21:09

## 2022-10-28 RX ADMIN — APIXABAN 5 MG: 5 TABLET, FILM COATED ORAL at 21:09

## 2022-10-28 RX ADMIN — HYDRALAZINE HYDROCHLORIDE 5 MG: 25 TABLET, FILM COATED ORAL at 06:31

## 2022-10-28 RX ADMIN — FUROSEMIDE 40 MG: 40 TABLET ORAL at 17:19

## 2022-10-28 RX ADMIN — METOPROLOL TARTRATE 12.5 MG: 25 TABLET, FILM COATED ORAL at 21:09

## 2022-10-28 RX ADMIN — APIXABAN 5 MG: 5 TABLET, FILM COATED ORAL at 09:21

## 2022-10-28 RX ADMIN — DOCUSATE SODIUM 100 MG: 100 CAPSULE ORAL at 21:09

## 2022-10-28 RX ADMIN — HYDRALAZINE HYDROCHLORIDE 5 MG: 25 TABLET, FILM COATED ORAL at 13:07

## 2022-10-28 RX ADMIN — PANTOPRAZOLE SODIUM 40 MG: 40 TABLET, DELAYED RELEASE ORAL at 06:32

## 2022-10-28 RX ADMIN — Medication 10 MG: at 21:09

## 2022-10-28 RX ADMIN — METOPROLOL TARTRATE 12.5 MG: 25 TABLET, FILM COATED ORAL at 09:21

## 2022-10-28 NOTE — THERAPY TREATMENT NOTE
Acute Care - Occupational Therapy Treatment Note   [place]     Patient Name: Tre Kinney  : 1932  MRN: [id number]  Today's Date: 10/28/2022  Onset of Illness/Injury or Date of Surgery: 10/06/22     Referring Physician: Janneth    Admit Date: 10/5/2022     No diagnosis found.  Patient Active Problem List   Diagnosis   • Acid reflux   • Ankle arthralgia   • Cardiac conduction disorder   • Closed fracture of distal fibula   • Arteriosclerosis of coronary artery   • Fracture of distal end of tibia   • Elevated cholesterol   • BP (high blood pressure)   • Hypertrophic polyarthritis   • OP (osteoporosis)   • Right heart failure (HCC)   • Heart failure, chronic, right-sided (HCC)   • SOB (shortness of breath)   • Palpitations   • CHF (congestive heart failure) (HCC)   • Congestive heart failure (HCC)   • Precordial pain   • Chronic anticoagulation   • Hypokalemia   • Anemia, unspecified type   • Generalized weakness     Past Medical History:   Diagnosis Date   • Anemia    • Arthritis    • CHF (congestive heart failure) ([facility])    • Chronic kidney disease    • Coronary artery disease    • H/O blood clots    • History of transfusion     no reaction    • Hypertension      Past Surgical History:   Procedure Laterality Date   • ABDOMINAL SURGERY     • APPENDECTOMY     • CARDIAC SURGERY     •  SECTION     • COLONOSCOPY N/A 3/7/2018    Procedure: COLONOSCOPY;  Surgeon: Willie Gresham MD;  Location:  [facility];  Service:    • CORONARY ANGIOPLASTY WITH STENT PLACEMENT     • ENDOSCOPY N/A 3/6/2018    Procedure: ESOPHAGOGASTRODUODENOSCOPY;  Surgeon: Willie Gresham MD;  Location:  [facility];  Service:    • PACEMAKER IMPLANTATION      left side    • SKIN BIOPSY     • TUBAL ABDOMINAL LIGATION           OT ASSESSMENT FLOWSHEET (last 12 hours)     OT Evaluation and Treatment     Row Name 10/28/22 1250                   OT Time and Intention    Subjective Information complains of;weakness;fatigue;pain  -LM         Document Type therapy note (daily note)  -LM        Mode of Treatment occupational therapy  -LM        Patient Effort adequate  -LM        Comment Patient seen this afternoon with son Valdez present for light bue therex/fxl activity tolerance.  Patient completed yellow tband therex and light towel therex with frequent rest breaks.  Patient peformed all bue therex while seated in bedside chair.  Assisted PT after OT session with sit to stand and fxl transfers using rw.  Patient required min/mod assist x 2 for sit to stand but had bowel incontinence during activity requiring total assist with toilet hygiene.  -LM           General Information    Existing Precautions/Restrictions fall  -LM           Cognition    Affect/Mental Status (Cognition) WFL  -LM        Orientation Status (Cognition) oriented x 3  -LM           Positioning and Restraints    Post Treatment Position bed  -LM        In Bed call light within reach;encouraged to call for assist;notified nsg  -LM              User Key  (r) = Recorded By, (t) = Taken By, (c) = Cosigned By    Initials Name Effective Dates    LM Татьяна Bah, OT 06/16/21 -                        OT Recommendation and Plan           Outcome Measures     Row Name 10/28/22 1300 10/27/22 1448 10/26/22 1428       How much help from another is currently needed...    Putting on and taking off regular lower body clothing? 2  -LM 2  -LM 2  -LM    Bathing (including washing, rinsing, and drying) 2  -LM 2  -LM 2  -LM    Toileting (which includes using toilet bed pan or urinal) 2  -LM 2  -LM 2  -LM    Putting on and taking off regular upper body clothing 3  -LM 3  -LM 3  -LM    Taking care of personal grooming (such as brushing teeth) 3  -LM 3  -LM 3  -LM    Eating meals 3  -LM 3  -LM 3  -LM    AM-PAC 6 Clicks Score (OT) 15  -LM 15  -LM 15  -LM       Functional Assessment    Outcome Measure Options AM-PAC 6 Clicks Daily Activity (OT)  -LM AM-PAC 6 Clicks Daily Activity (OT)  -LM AM-PAC 6 Clicks  Daily Activity (OT)  -LM    Row Name 10/25/22 1358             How much help from another is currently needed...    Putting on and taking off regular lower body clothing? 2  -LM      Bathing (including washing, rinsing, and drying) 2  -LM      Toileting (which includes using toilet bed pan or urinal) 2  -LM      Putting on and taking off regular upper body clothing 3  -LM      Taking care of personal grooming (such as brushing teeth) 3  -LM      Eating meals 3  -LM      AM-PAC 6 Clicks Score (OT) 15  -LM         Functional Assessment    Outcome Measure Options AM-PAC 6 Clicks Daily Activity (OT)  -LM            User Key  (r) = Recorded By, (t) = Taken By, (c) = Cosigned By    Initials Name Provider Type    Татьяна Mccabe OT Occupational Therapist                Time Calculation:    Time Calculation- OT     Row Name 10/28/22 1350             Time Calculation- OT    OT Start Time 1250  -LM      OT Stop Time 1305  -LM      OT Time Calculation (min) 15 min  -LM      Total Timed Code Minutes- OT 15 minute(s)  -LM            User Key  (r) = Recorded By, (t) = Taken By, (c) = Cosigned By    Initials Name Provider Type    LM Татьяна Bah OT Occupational Therapist              Therapy Charges for Today     Code Description Service Date Service Provider Modifiers Qty    61577297731  OT THER PROC EA 15 MIN 10/27/2022 Татьяна Bah OT GO 1    [id number]  OT SELF CARE/MGMT/TRAIN EA 15 MIN 10/27/2022 Татьяна Bah OT GO 1    [id number]  OT THER PROC EA 15 MIN 10/28/2022 Татьяна Bah OT GO 1               Татьяна Bah OT  10/28/2022

## 2022-10-28 NOTE — CASE MANAGEMENT/SOCIAL WORK
Discharge Planning Assessment  [facility]     Patient Name: Tre Kinney  MRN: [id number]  Today's Date: 10/28/2022    Admit Date: 10/5/2022    Plan: Pt's family has filed a grievance with [employer] and is currently pending. SS to follow      Discharge Plan     Row Name 10/28/22 1446       Plan    Plan Pt's family has filed a grievance with [employer] and is currently pending. SS spoke with Swing Bed RN that insurance has up to 30 days to reply about Grievance. SS to follow              Continued Care and Services - Admitted Since 10/5/2022     Destination     Service Provider Request Status Selected Services Address Phone Fax Patient Preferred    [facility] Accepted N/A [address] [place] [address] [phone] --    [facility] Pending - Request Sent N/A [address] [place] [address] [phone] --    [facility] Declined  Does not have a secondary payor source N[facility]A [address] [place] [address] [phone] --            Selected Continued Care - Prior Encounters Includes continued care and service providers with selected services from prior encounters from 7/7/2022 to 10/28/2022    Discharged on 10/5/2022 Admission date: 9/19/2022 - Discharge disposition: Swing Bed    Destination     Service Provider Selected Services Address Phone Fax Patient Preferred    [facility] [address] [place], [place] [address] [address] [phone] -- --                      XANDER Wills

## 2022-10-28 NOTE — PLAN OF CARE
Goal Outcome Evaluation:         Patient is resting comfortably in bed; no acute changes this shift. No complaints at this time. Will continue to monitor.

## 2022-10-28 NOTE — PROGRESS NOTES
Pt admitted to swing bed on 10/5 for additional therapy in the setting of generalized weakness/debility. Pt not seen or examined today. I have reviewed her VS. I have discussed her care with SONAM Sims who does not report any acute events or issues that need to be addressed. BP overall stable today and will allow some relative hypertension. Cont current antihypertensive regimen. K+ was low normal yesterday, replaced per protocol and mildly elevated on repeat. Repeat K+ today is normal. Cont Eliquis for treatment of DVT.  Pt's family's grievance with insurance company currently pending. Cont PT/OT.       Mustapha Loredo, DO  10/28/22  15:52 EDT

## 2022-10-28 NOTE — NURSING NOTE
Patient was asked by nursing staff to get ambulate and sit up in chair the patient stated she didn't want to ambulate and will later.

## 2022-10-28 NOTE — THERAPY TREATMENT NOTE
Acute Care - Physical Therapy Treatment Note   [facility]     Patient Name: Tre Kinney  : 1932  MRN: [id number]  Today's Date: 10/28/2022   Onset of Illness/Injury or Date of Surgery: 10/06/22  Visit Dx:   No diagnosis found.  Patient Active Problem List   Diagnosis   • Acid reflux   • Ankle arthralgia   • Cardiac conduction disorder   • Closed fracture of distal fibula   • Arteriosclerosis of coronary artery   • Fracture of distal end of tibia   • Elevated cholesterol   • BP (high blood pressure)   • Hypertrophic polyarthritis   • OP (osteoporosis)   • Right heart failure (HCC)   • Heart failure, chronic, right-sided (HCC)   • SOB (shortness of breath)   • Palpitations   • CHF (congestive heart failure) (HCC)   • Congestive heart failure (HCC)   • Precordial pain   • Chronic anticoagulation   • Hypokalemia   • Anemia, unspecified type   • Generalized weakness     Past Medical History:   Diagnosis Date   • Anemia    • Arthritis    • CHF (congestive heart failure) (HCC)    • Chronic kidney disease    • Coronary artery disease    • H/O blood clots    • History of transfusion     no reaction    • Hypertension      Past Surgical History:   Procedure Laterality Date   • ABDOMINAL SURGERY     • APPENDECTOMY     • CARDIAC SURGERY     •  SECTION     • COLONOSCOPY N/A 3/7/2018    Procedure: COLONOSCOPY;  Surgeon: Willie Gresham MD;  Location:  [facility];  Service:    • CORONARY ANGIOPLASTY WITH STENT PLACEMENT     • ENDOSCOPY N/A 3/6/2018    Procedure: ESOPHAGOGASTRODUODENOSCOPY;  Surgeon: Willie Gresham MD;  Location:  [facility];  Service:    • PACEMAKER IMPLANTATION      left side    • SKIN BIOPSY     • TUBAL ABDOMINAL LIGATION       PT Assessment (last 12 hours)     PT Evaluation and Treatment     Row Name 10/28/22 1308          Physical Therapy Time and Intention    Document Type therapy note (daily note)  -     Mode of Treatment physical therapy  -     Patient Effort good  -SURESH      Comment Pt seen this date working on various LE TE and STSx2 with OT assist for standing with minAx2. Pt able to ambulate back to bed with CGAx2 and RW from bedside chair.  -     Row Name 10/28/22 1308          Bed Mobility    Bed Mobility sit-supine  -     Sit-Supine [place] (Bed Mobility) standby assist;supervision;contact guard  Pt able to get LE's into bed with CGA. Pt able to slight legs into midline of bed  -     Row Name 10/28/22 1308          Transfers    Transfers sit-stand transfer;stand-sit transfer  -     Row Name 10/28/22 1308          Chair-Bed Transfer    Chair-Bed [place] (Transfers) contact guard;minimum assist (75% patient effort);1 person assist;2 person assist  -     Row Name 10/28/22 1308          Sit-Stand Transfer    Sit-Stand [place] (Transfers) minimum assist (75% patient effort);unable to assess  -     Assistive Device (Sit-Stand Transfers) walker, front-wheeled  -     Comment, (Sit-Stand Transfer) Pt able to stand for a short period of time to get pad changed underneath chair/hygiene with min/modA to remain standing along with verbal cueing to keep upright/push through lower extremities.  -     Row Name 10/28/22 1308          Stand-Sit Transfer    Stand-Sit [place] (Transfers) contact guard;minimum assist (75% patient effort);2 person assist  -     Assistive Device (Stand-Sit Transfers) walker, front-wheeled  -     Row Name 10/28/22 1308          Gait/Stairs (Locomotion)    [place] Level (Gait) minimum assist (75% patient effort);2 person assist  -     Assistive Device (Gait) walker, front-wheeled  -     Distance in Feet (Gait) Pt able to ambulate from bedside chair back to bedside, approximately 1-3'  -     Row Name 10/28/22 1308          Motor Skills    Therapeutic Exercise hip;knee  hip abduction in sitting with yellow theraband, 2-3x10; knee extention bilaterally with yellow theraband 1-2x10, 1-2x5, glute sets 3x10, rest breaks given.   -     Row Name 10/28/22 1308          Plan of Care Review    Outcome Evaluation Pt seen this date with OT assist for standing patient and for safety with ambulation. Pt required less assist this date with good cooperation with therapy. Pt demonstrates fair tolerance to activity, benefits from rest breaks.  -     Row Name 10/28/22 1308          Positioning and Restraints    Pre-Treatment Position sitting in chair/recliner  -     Post Treatment Position bed  -KH     In Bed supine;call light within reach;exit alarm on  -           User Key  (r) = Recorded By, (t) = Taken By, (c) = Cosigned By    Initials Name Provider Type    Radha Yusuf, PT Physical Therapist                  PT Recommendation and Plan     Outcome Evaluation: Pt seen this date with OT assist for standing patient and for safety with ambulation. Pt required less assist this date with good cooperation with therapy. Pt demonstrates fair tolerance to activity, benefits from rest breaks.   Outcome Measures     Row Name 10/28/22 1305 10/28/22 1300 10/27/22 1448       How much help from another person do you currently need...    Turning from your back to your side while in flat bed without using bedrails? 2  -KH -- --    Moving from lying on back to sitting on the side of a flat bed without bedrails? 2  -KH -- --    Moving to and from a bed to a chair (including a wheelchair)? 2  -KH -- --    Standing up from a chair using your arms (e.g., wheelchair, bedside chair)? 3  minAx2  -KH -- --    Climbing 3-5 steps with a railing? 1  -KH -- --    To walk in hospital room? 3  -KH -- --    AM-PAC 6 Clicks Score (PT) 13  -KH -- --       How much help from another is currently needed...    Putting on and taking off regular lower body clothing? -- 2  -LM 2  -LM    Bathing (including washing, rinsing, and drying) -- 2  -LM 2  -LM    Toileting (which includes using toilet bed pan or urinal) -- 2  -LM 2  -LM    Putting on and taking off regular upper body  clothing -- 3  -LM 3  -LM    Taking care of personal grooming (such as brushing teeth) -- 3  -LM 3  -LM    Eating meals -- 3  -LM 3  -LM    AM-PAC 6 Clicks Score (OT) -- 15  -LM 15  -LM       Functional Assessment    Outcome Measure Options AM-PAC 6 Clicks Basic Mobility (PT)  -KH AM-PAC 6 Clicks Daily Activity (OT)  -LM AM-PAC 6 Clicks Daily Activity (OT)  -LM    Row Name 10/26/22 1428             How much help from another is currently needed...    Putting on and taking off regular lower body clothing? 2  -LM      Bathing (including washing, rinsing, and drying) 2  -LM      Toileting (which includes using toilet bed pan or urinal) 2  -LM      Putting on and taking off regular upper body clothing 3  -LM      Taking care of personal grooming (such as brushing teeth) 3  -LM      Eating meals 3  -LM      AM-PAC 6 Clicks Score (OT) 15  -LM         Functional Assessment    Outcome Measure Options AM-PAC 6 Clicks Daily Activity (OT)  -            User Key  (r) = Recorded By, (t) = Taken By, (c) = Cosigned By    Initials Name Provider Type     Татьяна Bah, OT Occupational Therapist    Radha Yusuf, PT Physical Therapist                 Time Calculation:    PT Charges     Row Name 10/28/22 1359             Time Calculation    Start Time 1308  -KH      Stop Time 1331  -KH      Time Calculation (min) 23 min  -KH      PT Received On 10/28/22  -            User Key  (r) = Recorded By, (t) = Taken By, (c) = Cosigned By    Initials Name Provider Type    Radha Yusuf, EDEL Physical Therapist              Therapy Charges for Today     Code Description Service Date Service Provider Modifiers Qty    05686422510 HC PT THER PROC EA 15 MIN 10/28/2022 Radha Vu, PT GP 1    87281605660 HC PT THERAPEUTIC ACT EA 15 MIN 10/28/2022 Radha Vu, PT GP 1          PT G-Codes  Outcome Measure Options: AM-PAC 6 Clicks Basic Mobility (PT)  AM-PAC 6 Clicks Score (PT): 13  AM-PAC 6 Clicks Score (OT): 15    Radha  Horace, PT  10/28/2022

## 2022-10-28 NOTE — PLAN OF CARE
Goal Outcome Evaluation:  Plan of Care Reviewed With: patient        Progress: improving  Outcome Evaluation: Patient VSS. Pt sat up in chair during shift. Pt worked with physical therapy & OT. Pt voiced no further concerns at this time.

## 2022-10-28 NOTE — NURSING NOTE
Pts swan cath was clamped at 1720 per orders. Pt was educated on purpose of bladder training and to notify the RN, if pt experiences urge to void; pt voiced understanding. Will continue to monitor. Bladder training initiated and in progress.

## 2022-10-29 PROCEDURE — 97530 THERAPEUTIC ACTIVITIES: CPT

## 2022-10-29 PROCEDURE — 97110 THERAPEUTIC EXERCISES: CPT

## 2022-10-29 RX ADMIN — TAMSULOSIN HYDROCHLORIDE 0.4 MG: 0.4 CAPSULE ORAL at 08:50

## 2022-10-29 RX ADMIN — DOCUSATE SODIUM 100 MG: 100 CAPSULE ORAL at 21:33

## 2022-10-29 RX ADMIN — GABAPENTIN 300 MG: 300 CAPSULE ORAL at 21:33

## 2022-10-29 RX ADMIN — ESCITALOPRAM 10 MG: 10 TABLET, FILM COATED ORAL at 08:50

## 2022-10-29 RX ADMIN — APIXABAN 5 MG: 5 TABLET, FILM COATED ORAL at 08:50

## 2022-10-29 RX ADMIN — FUROSEMIDE 40 MG: 40 TABLET ORAL at 08:50

## 2022-10-29 RX ADMIN — PANTOPRAZOLE SODIUM 40 MG: 40 TABLET, DELAYED RELEASE ORAL at 05:56

## 2022-10-29 RX ADMIN — Medication 10 ML: at 08:50

## 2022-10-29 RX ADMIN — Medication 10 ML: at 21:34

## 2022-10-29 RX ADMIN — METOPROLOL TARTRATE 12.5 MG: 25 TABLET, FILM COATED ORAL at 08:50

## 2022-10-29 RX ADMIN — APIXABAN 5 MG: 5 TABLET, FILM COATED ORAL at 21:33

## 2022-10-29 RX ADMIN — Medication 1 TABLET: at 08:50

## 2022-10-29 RX ADMIN — Medication 10 MG: at 21:33

## 2022-10-29 RX ADMIN — HYDRALAZINE HYDROCHLORIDE 5 MG: 25 TABLET, FILM COATED ORAL at 05:56

## 2022-10-29 RX ADMIN — HYDRALAZINE HYDROCHLORIDE 5 MG: 25 TABLET, FILM COATED ORAL at 14:28

## 2022-10-29 RX ADMIN — HYDRALAZINE HYDROCHLORIDE 5 MG: 25 TABLET, FILM COATED ORAL at 21:33

## 2022-10-29 RX ADMIN — FUROSEMIDE 40 MG: 40 TABLET ORAL at 17:32

## 2022-10-29 RX ADMIN — DOCUSATE SODIUM 100 MG: 100 CAPSULE ORAL at 08:50

## 2022-10-29 RX ADMIN — METOPROLOL TARTRATE 12.5 MG: 25 TABLET, FILM COATED ORAL at 21:33

## 2022-10-29 NOTE — PROGRESS NOTES
Pt admitted to swing bed on 10/5 for additional therapy in the setting of generalized weakness/debility. Pt not seen or examined today. I have reviewed her VS. I have discussed her care with SONAM Sims who does not report any acute events or issues that need to be addressed. RN does report some bowel incontinence. BP stable today. Cont current antihypertensive regimen. Cont Eliquis for treatment of DVT. Rodriguez catheter remains in place in the setting of urinary retention. Cont bladder training.  Pt's family's grievance with insurance company currently pending. Cont PT/OT.       Mustapha Loredo,   10/29/22  15:52 EDT

## 2022-10-29 NOTE — THERAPY TREATMENT NOTE
Acute Care - Physical Therapy Treatment Note   [place]     Patient Name: Tre Kinney  : 1932  MRN: [id number]  Today's Date: 10/29/2022   Onset of Illness/Injury or Date of Surgery: 10/06/22  Visit Dx:   No diagnosis found.  Patient Active Problem List   Diagnosis   • Acid reflux   • Ankle arthralgia   • Cardiac conduction disorder   • Closed fracture of distal fibula   • Arteriosclerosis of coronary artery   • Fracture of distal end of tibia   • Elevated cholesterol   • BP (high blood pressure)   • Hypertrophic polyarthritis   • OP (osteoporosis)   • Right heart failure (HCC)   • Heart failure, chronic, right-sided (HCC)   • SOB (shortness of breath)   • Palpitations   • CHF (congestive heart failure) (HCC)   • Congestive heart failure (HCC)   • Precordial pain   • Chronic anticoagulation   • Hypokalemia   • Anemia, unspecified type   • Generalized weakness     Past Medical History:   Diagnosis Date   • Anemia    • Arthritis    • CHF (congestive heart failure) (HCC)    • Chronic kidney disease    • Coronary artery disease    • H/O blood clots    • History of transfusion     no reaction    • Hypertension      Past Surgical History:   Procedure Laterality Date   • ABDOMINAL SURGERY     • APPENDECTOMY     • CARDIAC SURGERY     •  SECTION     • COLONOSCOPY N/A 3/7/2018    Procedure: COLONOSCOPY;  Surgeon: Willie Gresham MD;  Location:  [facility];  Service:    • CORONARY ANGIOPLASTY WITH STENT PLACEMENT     • ENDOSCOPY N/A 3/6/2018    Procedure: ESOPHAGOGASTRODUODENOSCOPY;  Surgeon: Willie Gresham MD;  Location:  [facility];  Service:    • PACEMAKER IMPLANTATION      left side    • SKIN BIOPSY     • TUBAL ABDOMINAL LIGATION       PT Assessment (last 12 hours)     PT Evaluation and Treatment     Row Name 10/29/22 1600 10/29/22 1242       Physical Therapy Time and Intention    Document Type therapy note (daily note)  - therapy note (daily note)  -    Mode of Treatment physical therapy   - physical therapy  -    Patient Effort good  - adequate  -    Comment -- Pt seen for therapy this date, transferred from bed to chair with OT assist with ambulation/stepping, STS transfer, and LE exercises.  -    Row Name 10/29/22 1600 10/29/22 1242       Bed Mobility    Bed Mobility sit-supine  - --    Sit-Supine Lares (Bed Mobility) standby assist;supervision;contact guard  Pt able to get LE's into bed with CGA. Pt able to slight legs into midline of bed  - --    Comment, (Bed Mobility) -- Pt seen sitting EOB with CNA's when PT offerred to transfer pt to bedside chair with OT assist.  -    Row Name 10/29/22 1600 10/29/22 1242       Transfers    Transfers sit-stand transfer;stand-sit transfer  - sit-stand transfer;stand-sit transfer  -    Row Name 10/29/22 1242          Bed-Chair Transfer    Bed-Chair [place] (Transfers) contact guard;minimum assist (75% patient effort);2 person assist  -     Assistive Device (Bed-Chair Transfers) walker, front-wheeled  -     Row Name 10/29/22 1600 10/29/22 1242       Chair-Bed Transfer    Chair-Bed [place] (Transfers) contact guard;minimum assist (75% patient effort);1 person assist;2 person assist  - --  -    Row Name 10/29/22 1600 10/29/22 1242       Sit-Stand Transfer    Sit-Stand [place] (Transfers) minimum assist (75% patient effort);unable to assess  - minimum assist (75% patient effort);moderate assist (50% patient effort);maximum assist (25% patient effort);1 person assist;2 person assist  2 person assist when standing from bedside  -    Assistive Device (Sit-Stand Transfers) walker, front-wheeled  - walker, front-wheeled  -    Row Name 10/29/22 1600 10/29/22 1242       Stand-Sit Transfer    Stand-Sit [place] (Transfers) contact guard;minimum assist (75% patient effort);2 person assist  - minimum assist (75% patient effort);moderate assist (50% patient effort);1 person assist  -    Assistive Device  (Stand-Sit Transfers) walker, front-wheeled  -KH --  -    Row Name 10/29/22 1600 10/29/22 1242       Gait/Stairs (Locomotion)    [place] Level (Gait) minimum assist (75% patient effort);2 person assist  - minimum assist (75% patient effort);2 person assist  -    Assistive Device (Gait) walker, front-wheeled  -KH walker, front-wheeled  -KH    Distance in Feet (Gait) -- Pt able to ambulate from bedside to chair, approximately 1-2' worth of stepping  -    Row Name 10/29/22 1242          Motor Skills    Therapeutic Exercise hip;knee  marching, yellow theraband resisted knee flexion and extension, yellow theraband hip abduction - all approximately 2-3 sets of 10 repititions  -     Row Name 10/29/22 1242          Plan of Care Review    Outcome Evaluation Pt's son Willian was available via [employer] where he witnessed portion of therapy session. OT assisted with transfer from bed to chair, PT left to allow OT session. PT returned where son was Facetimed during portion of treatment.  -     Row Name 10/29/22 1242          Positioning and Restraints    Pre-Treatment Position in bed  -     Post Treatment Position chair  -     In Chair sitting;call light within reach  nursing notified of chair alarm; CL between chair arm and pt's L leg  -           User Key  (r) = Recorded By, (t) = Taken By, (c) = Cosigned By    Initials Name Provider Type    Radha Yusuf, PT Physical Therapist                  PT Recommendation and Plan     Outcome Evaluation: Pt's padmaja Dorsey was available via [employer] where he witnessed portion of therapy session. OT assisted with transfer from bed to chair, PT left to allow OT session. PT returned where son was Facetimed during portion of treatment.   Outcome Measures     Row Name 10/29/22 1242 10/29/22 1200 10/28/22 1305       How much help from another person do you currently need...    Turning from your back to your side while in flat bed without using bedrails? 2  -KH -- 2  -KH     Moving from lying on back to sitting on the side of a flat bed without bedrails? 2  -KH -- 2  -KH    Moving to and from a bed to a chair (including a wheelchair)? 2  -KH -- 2  -KH    Standing up from a chair using your arms (e.g., wheelchair, bedside chair)? 3  -KH -- 3  minAx2  -KH    Climbing 3-5 steps with a railing? 1  -KH -- 1  -KH    To walk in hospital room? 2  Pt able to ambulate short distances however does not tolerate long periods of standing per observation without knee's buckling/giving way or patient getting tired.  -KH -- 3  -KH    AM-PAC 6 Clicks Score (PT) 12  -KH -- 13  -KH       How much help from another is currently needed...    Putting on and taking off regular lower body clothing? -- 2  -HB --    Bathing (including washing, rinsing, and drying) -- 2  -HB --    Toileting (which includes using toilet bed pan or urinal) -- 2  -HB --    Putting on and taking off regular upper body clothing -- 3  -HB --    Taking care of personal grooming (such as brushing teeth) -- 3  -HB --    Eating meals -- 4  -HB --    AM-PAC 6 Clicks Score (OT) -- 16  -HB --       Functional Assessment    Outcome Measure Options AM-PAC 6 Clicks Basic Mobility (PT)  -KH -- AM-PAC 6 Clicks Basic Mobility (PT)  -KH    Row Name 10/28/22 1300 10/27/22 1448          How much help from another is currently needed...    Putting on and taking off regular lower body clothing? 2  -LM 2  -LM     Bathing (including washing, rinsing, and drying) 2  -LM 2  -LM     Toileting (which includes using toilet bed pan or urinal) 2  -LM 2  -LM     Putting on and taking off regular upper body clothing 3  -LM 3  -LM     Taking care of personal grooming (such as brushing teeth) 3  -LM 3  -LM     Eating meals 3  -LM 3  -LM     AM-PAC 6 Clicks Score (OT) 15  -LM 15  -LM        Functional Assessment    Outcome Measure Options AM-PAC 6 Clicks Daily Activity (OT)  -LM AM-PAC 6 Clicks Daily Activity (OT)  -LM           User Key  (r) = Recorded By, (t) =  Taken By, (c) = Cosigned By    Initials Name Provider Type    Татьяна Mccabe, OT Occupational Therapist    KH Radha Vu, PT Physical Therapist    HB Katelynn Fernández, OT Occupational Therapist                 Time Calculation:    PT Charges     Row Name 10/29/22 1632 10/29/22 1631          Time Calculation    Start Time 1242  -KH 1152  -KH     Stop Time 1302  -KH 1155  -KH     Time Calculation (min) 20 min  -KH 3 min  -KH     PT Received On 10/29/22  -KH 10/29/22  -KH           User Key  (r) = Recorded By, (t) = Taken By, (c) = Cosigned By    Initials Name Provider Type    Radha Yusuf, PT Physical Therapist              Therapy Charges for Today     Code Description Service Date Service Provider Modifiers Qty    99745265721 HC PT THER PROC EA 15 MIN 10/28/2022 Radha Vu, PT GP 1    90865245324 HC PT THERAPEUTIC ACT EA 15 MIN 10/28/2022 Radha Vu, PT GP 1    90019664386 HC PT THER PROC EA 15 MIN 10/29/2022 Radha Vu, PT GP 1    15973458120 HC PT THERAPEUTIC ACT EA 15 MIN 10/29/2022 Radha Vu, PT GP 1          PT G-Codes  Outcome Measure Options: AM-PAC 6 Clicks Basic Mobility (PT)  AM-PAC 6 Clicks Score (PT): 12  AM-PAC 6 Clicks Score (OT): 16    Radha Vu PT  10/29/2022

## 2022-10-29 NOTE — PLAN OF CARE
Goal Outcome Evaluation:              Outcome Evaluation: Pt is resting in bed at this time. Pt has sat in chair this shift. Pt undergoing bladder training at this time. Pt states that she is still unable to feel the urge to void at this time. Pt voices no complaints. Will continue plan of care.

## 2022-10-29 NOTE — PLAN OF CARE
Goal Outcome Evaluation:  Plan of Care Reviewed With: patient        Progress: improving  Outcome Evaluation: Patient VSS. Pt has been bladder training but pt hasn't had any urge to urinate. Pt worked with PT & sat up in the chair throughout the shift. Pt's family was facetimed for PT. Pt voiced no further concerns at this time.

## 2022-10-29 NOTE — THERAPY TREATMENT NOTE
Acute Care - Occupational Therapy Treatment Note   [place]     Patient Name: Tre Kinney  : 1932  MRN: [id number]  Today's Date: 10/29/2022  Onset of Illness/Injury or Date of Surgery: 10/06/22     Referring Physician: Janneth    Admit Date: 10/5/2022     No diagnosis found.  Patient Active Problem List   Diagnosis   • Acid reflux   • Ankle arthralgia   • Cardiac conduction disorder   • Closed fracture of distal fibula   • Arteriosclerosis of coronary artery   • Fracture of distal end of tibia   • Elevated cholesterol   • BP (high blood pressure)   • Hypertrophic polyarthritis   • OP (osteoporosis)   • Right heart failure (HCC)   • Heart failure, chronic, right-sided (HCC)   • SOB (shortness of breath)   • Palpitations   • CHF (congestive heart failure) (HCC)   • Congestive heart failure (HCC)   • Precordial pain   • Chronic anticoagulation   • Hypokalemia   • Anemia, unspecified type   • Generalized weakness     Past Medical History:   Diagnosis Date   • Anemia    • Arthritis    • CHF (congestive heart failure) ([facility])    • Chronic kidney disease    • Coronary artery disease    • H/O blood clots    • History of transfusion     no reaction    • Hypertension      Past Surgical History:   Procedure Laterality Date   • ABDOMINAL SURGERY     • APPENDECTOMY     • CARDIAC SURGERY     •  SECTION     • COLONOSCOPY N/A 3/7/2018    Procedure: COLONOSCOPY;  Surgeon: Willie Gresham MD;  Location:  [facility];  Service:    • CORONARY ANGIOPLASTY WITH STENT PLACEMENT     • ENDOSCOPY N/A 3/6/2018    Procedure: ESOPHAGOGASTRODUODENOSCOPY;  Surgeon: Willie Gresham MD;  Location:  [facility];  Service:    • PACEMAKER IMPLANTATION      left side    • SKIN BIOPSY     • TUBAL ABDOMINAL LIGATION           OT ASSESSMENT FLOWSHEET (last 12 hours)     OT Evaluation and Treatment     Row Name 10/29/22 1215                   OT Time and Intention    Subjective Information no complaints  -HB        Document Type  therapy note (daily note)  -HB        Mode of Treatment individual therapy;occupational therapy  -HB        Total Minutes, Occupational Therapy 15  -HB        Patient Effort adequate  -HB        Symptoms Noted During/After Treatment none  -HB           General Information    Patient Profile Reviewed yes  -HB        Patient/Family/Caregiver Comments/Observations Patient agreeable to OT tx this date.  -HB        General Observations of Patient Pt tolerated OT fair with no adverse reactions.  -HB        Existing Precautions/Restrictions fall  -HB        Limitations/Impairments safety/cognitive  -HB           Pain Assessment    Pretreatment Pain Rating 0/10 - no pain  -HB        Posttreatment Pain Rating 0/10 - no pain  -HB           Cognition    Affect/Mental Status (Cognition) WFL  -HB        Orientation Status (Cognition) oriented x 3  -HB        Follows Commands (Cognition) WFL  -HB           Grooming Assessment/Training    [place] Level (Grooming) grooming skills;nail care;wash face, hands;set up  -HB        Position (Grooming) supported sitting  -HB           Self-Feeding Assessment/Training    [place] Level (Feeding) feeding skills;set up  -HB        Position (Self-Feeding) supported sitting  -HB           Bed-Chair Transfer    Bed-Chair [place] (Transfers) maximum assist (25% patient effort);2 person assist;verbal cues;nonverbal cues (demo/gesture)  assisted PT with transfer( different tx sessions for OT/PT)  -HB           Stand-Sit Transfer    Stand-Sit [place] (Transfers) maximum assist (25% patient effort);2 person assist;nonverbal cues (demo/gesture);verbal cues  -HB        Assistive Device (Stand-Sit Transfers) walker, front-wheeled  -HB        Comment, (Stand-Sit Transfer) see above for details  -HB           Motor Skills    Motor Skills motor control/coordination interventions  -HB        Motor Control/Coordination Interventions therapeutic exercise/ROM  -HB        Therapeutic Exercise  shoulder;elbow/forearm;hand;wrist  -HB        Additional Documentation --  flex bar and bicep curls for 2 sets of 10 reps. rest between sets ; pt educated on HEP with theraband with good understanding solangee/demo.  -HB           Positioning and Restraints    Pre-Treatment Position --  EOB  -HB        Post Treatment Position chair  -HB        In Chair encouraged to call for assist;call light within reach  setup for lunch  -HB              User Key  (r) = Recorded By, (t) = Taken By, (c) = Cosigned By    Initials Name Effective Dates    HB Katelynn Fernández, OT 05/25/21 -                        OT Recommendation and Plan           Outcome Measures     Row Name 10/29/22 1200 10/28/22 1305 10/28/22 1300       How much help from another person do you currently need...    Turning from your back to your side while in flat bed without using bedrails? -- 2  -KH --    Moving from lying on back to sitting on the side of a flat bed without bedrails? -- 2  -KH --    Moving to and from a bed to a chair (including a wheelchair)? -- 2  -KH --    Standing up from a chair using your arms (e.g., wheelchair, bedside chair)? -- 3  minAx2  -KH --    Climbing 3-5 steps with a railing? -- 1  -KH --    To walk in hospital room? -- 3  -KH --    AM-PAC 6 Clicks Score (PT) -- 13  -KH --       How much help from another is currently needed...    Putting on and taking off regular lower body clothing? 2  -HB -- 2  -LM    Bathing (including washing, rinsing, and drying) 2  -HB -- 2  -LM    Toileting (which includes using toilet bed pan or urinal) 2  -HB -- 2  -LM    Putting on and taking off regular upper body clothing 3  -HB -- 3  -LM    Taking care of personal grooming (such as brushing teeth) 3  -HB -- 3  -LM    Eating meals 4  -HB -- 3  -LM    AM-PAC 6 Clicks Score (OT) 16  -HB -- 15  -LM       Functional Assessment    Outcome Measure Options -- AM-PAC 6 Clicks Basic Mobility (PT)  -KH AM-PAC 6 Clicks Daily Activity (OT)  -LM    Row Name  10/27/22 1448 10/26/22 1428          How much help from another is currently needed...    Putting on and taking off regular lower body clothing? 2  -LM 2  -LM     Bathing (including washing, rinsing, and drying) 2  -LM 2  -LM     Toileting (which includes using toilet bed pan or urinal) 2  -LM 2  -LM     Putting on and taking off regular upper body clothing 3  -LM 3  -LM     Taking care of personal grooming (such as brushing teeth) 3  -LM 3  -LM     Eating meals 3  -LM 3  -LM     AM-PAC 6 Clicks Score (OT) 15  -LM 15  -LM        Functional Assessment    Outcome Measure Options AM-PAC 6 Clicks Daily Activity (OT)  -LM AM-PAC 6 Clicks Daily Activity (OT)  -LM           User Key  (r) = Recorded By, (t) = Taken By, (c) = Cosigned By    Initials Name Provider Type     Татьяна Bah, OT Occupational Therapist    Radha Yusuf, PT Physical Therapist    Katelynn Gan OT Occupational Therapist                Time Calculation:    Time Calculation- OT     Row Name 10/29/22 1220             Time Calculation- OT    Total Timed Code Minutes- OT 15 minute(s)  -            User Key  (r) = Recorded By, (t) = Taken By, (c) = Cosigned By    Initials Name Provider Type     Katelynn Fernández OT Occupational Therapist              Therapy Charges for Today     Code Description Service Date Service Provider Modifiers Qty    66379421196  OT THERAPEUTIC ACT EA 15 MIN 10/29/2022 Katelynn Fernández OT GO 1               Katelynn Fernández OT  10/29/2022

## 2022-10-30 RX ADMIN — HYDRALAZINE HYDROCHLORIDE 5 MG: 25 TABLET, FILM COATED ORAL at 21:33

## 2022-10-30 RX ADMIN — METOPROLOL TARTRATE 12.5 MG: 25 TABLET, FILM COATED ORAL at 08:41

## 2022-10-30 RX ADMIN — ESCITALOPRAM 10 MG: 10 TABLET, FILM COATED ORAL at 08:41

## 2022-10-30 RX ADMIN — Medication 10 ML: at 08:42

## 2022-10-30 RX ADMIN — Medication 10 MG: at 21:33

## 2022-10-30 RX ADMIN — TAMSULOSIN HYDROCHLORIDE 0.4 MG: 0.4 CAPSULE ORAL at 08:41

## 2022-10-30 RX ADMIN — METOPROLOL TARTRATE 12.5 MG: 25 TABLET, FILM COATED ORAL at 21:33

## 2022-10-30 RX ADMIN — GABAPENTIN 300 MG: 300 CAPSULE ORAL at 21:33

## 2022-10-30 RX ADMIN — DOCUSATE SODIUM 100 MG: 100 CAPSULE ORAL at 08:41

## 2022-10-30 RX ADMIN — FUROSEMIDE 40 MG: 40 TABLET ORAL at 08:41

## 2022-10-30 RX ADMIN — PANTOPRAZOLE SODIUM 40 MG: 40 TABLET, DELAYED RELEASE ORAL at 06:24

## 2022-10-30 RX ADMIN — APIXABAN 5 MG: 5 TABLET, FILM COATED ORAL at 21:33

## 2022-10-30 RX ADMIN — DOCUSATE SODIUM 100 MG: 100 CAPSULE ORAL at 21:33

## 2022-10-30 RX ADMIN — Medication 10 ML: at 21:33

## 2022-10-30 RX ADMIN — HYDRALAZINE HYDROCHLORIDE 5 MG: 25 TABLET, FILM COATED ORAL at 06:23

## 2022-10-30 RX ADMIN — FUROSEMIDE 40 MG: 40 TABLET ORAL at 17:06

## 2022-10-30 RX ADMIN — APIXABAN 5 MG: 5 TABLET, FILM COATED ORAL at 08:41

## 2022-10-30 RX ADMIN — Medication 1 TABLET: at 08:41

## 2022-10-30 RX ADMIN — HYDRALAZINE HYDROCHLORIDE 5 MG: 25 TABLET, FILM COATED ORAL at 13:20

## 2022-10-30 NOTE — PROGRESS NOTES
Pt admitted to swing bed on 10/5 for additional therapy in the setting of generalized weakness/debility. Pt not seen or examined today. I have reviewed her VS. I have discussed her care with SONAM Sims who does not report any acute events or issues that need to be addressed. BP stable today. Cont current antihypertensive regimen. Cont Eliquis for treatment of DVT. Rodriguez catheter remains in place in the setting of urinary retention. Nursing staff performing bladder training but they report pt has not had the urge to urinate. No fecal incontinence reported today.  Pt's family's grievance with insurance company currently pending. Cont PT/OT. Repeat labs in the AM.       Mustapha Loredo DO  10/30/22  15:52 EDT

## 2022-10-30 NOTE — PLAN OF CARE
Goal Outcome Evaluation:              Outcome Evaluation: Patient has rested in bed, no complaints or request at this time, patient is currently up to the bedside chair, VSS, bladder training remains in place, patient still does not have the urge to urinate.  Will continue plan of care.

## 2022-10-30 NOTE — PLAN OF CARE
Goal Outcome Evaluation:  Plan of Care Reviewed With: patient        Progress: improving  Outcome Evaluation: Patient VSS. Pt has sat up in the chair throughout the shift. Family at bedside. Pt still does not have the urge to urinate MD aware. Pt voiced no further concerns at this time.

## 2022-10-31 LAB
ALBUMIN SERPL-MCNC: 2.64 G/DL (ref 3.5–5.2)
ALBUMIN/GLOB SERPL: 0.9 G/DL
ALP SERPL-CCNC: 48 U/L (ref 39–117)
ALT SERPL W P-5'-P-CCNC: <5 U/L (ref 1–33)
ANION GAP SERPL CALCULATED.3IONS-SCNC: 11.6 MMOL/L (ref 5–15)
AST SERPL-CCNC: 10 U/L (ref 1–32)
BASOPHILS # BLD AUTO: 0.06 10*3/MM3 (ref 0–0.2)
BASOPHILS NFR BLD AUTO: 0.9 % (ref 0–1.5)
BILIRUB SERPL-MCNC: 0.3 MG/DL (ref 0–1.2)
BUN SERPL-MCNC: 24 MG/DL (ref 8–23)
BUN/CREAT SERPL: 14.2 (ref 7–25)
CALCIUM SPEC-SCNC: 8.7 MG/DL (ref 8.6–10.5)
CHLORIDE SERPL-SCNC: 103 MMOL/L (ref 98–107)
CO2 SERPL-SCNC: 26.4 MMOL/L (ref 22–29)
CREAT SERPL-MCNC: 1.69 MG/DL (ref 0.57–1)
DEPRECATED RDW RBC AUTO: 57.9 FL (ref 37–54)
EGFRCR SERPLBLD CKD-EPI 2021: 28.7 ML/MIN/1.73
EOSINOPHIL # BLD AUTO: 0.55 10*3/MM3 (ref 0–0.4)
EOSINOPHIL NFR BLD AUTO: 8 % (ref 0.3–6.2)
ERYTHROCYTE [DISTWIDTH] IN BLOOD BY AUTOMATED COUNT: 16.3 % (ref 12.3–15.4)
GLOBULIN UR ELPH-MCNC: 2.9 GM/DL
GLUCOSE SERPL-MCNC: 104 MG/DL (ref 65–99)
HCT VFR BLD AUTO: 30.1 % (ref 34–46.6)
HGB BLD-MCNC: 9.4 G/DL (ref 12–15.9)
IMM GRANULOCYTES # BLD AUTO: 0.03 10*3/MM3 (ref 0–0.05)
IMM GRANULOCYTES NFR BLD AUTO: 0.4 % (ref 0–0.5)
LYMPHOCYTES # BLD AUTO: 1.44 10*3/MM3 (ref 0.7–3.1)
LYMPHOCYTES NFR BLD AUTO: 20.9 % (ref 19.6–45.3)
MCH RBC QN AUTO: 30 PG (ref 26.6–33)
MCHC RBC AUTO-ENTMCNC: 31.2 G/DL (ref 31.5–35.7)
MCV RBC AUTO: 96.2 FL (ref 79–97)
MONOCYTES # BLD AUTO: 0.53 10*3/MM3 (ref 0.1–0.9)
MONOCYTES NFR BLD AUTO: 7.7 % (ref 5–12)
NEUTROPHILS NFR BLD AUTO: 4.29 10*3/MM3 (ref 1.7–7)
NEUTROPHILS NFR BLD AUTO: 62.1 % (ref 42.7–76)
NRBC BLD AUTO-RTO: 0 /100 WBC (ref 0–0.2)
PLATELET # BLD AUTO: 229 10*3/MM3 (ref 140–450)
PMV BLD AUTO: 9.8 FL (ref 6–12)
POTASSIUM SERPL-SCNC: 3.6 MMOL/L (ref 3.5–5.2)
PROT SERPL-MCNC: 5.5 G/DL (ref 6–8.5)
RBC # BLD AUTO: 3.13 10*6/MM3 (ref 3.77–5.28)
SODIUM SERPL-SCNC: 141 MMOL/L (ref 136–145)
WBC NRBC COR # BLD: 6.9 10*3/MM3 (ref 3.4–10.8)

## 2022-10-31 PROCEDURE — 99309 SBSQ NF CARE MODERATE MDM 30: CPT | Performed by: INTERNAL MEDICINE

## 2022-10-31 PROCEDURE — 80053 COMPREHEN METABOLIC PANEL: CPT | Performed by: INTERNAL MEDICINE

## 2022-10-31 PROCEDURE — 97110 THERAPEUTIC EXERCISES: CPT

## 2022-10-31 PROCEDURE — 85025 COMPLETE CBC W/AUTO DIFF WBC: CPT | Performed by: INTERNAL MEDICINE

## 2022-10-31 PROCEDURE — 97116 GAIT TRAINING THERAPY: CPT

## 2022-10-31 RX ORDER — POTASSIUM CHLORIDE 20 MEQ/1
40 TABLET, EXTENDED RELEASE ORAL EVERY 4 HOURS
Status: COMPLETED | OUTPATIENT
Start: 2022-10-31 | End: 2022-10-31

## 2022-10-31 RX ADMIN — FUROSEMIDE 40 MG: 40 TABLET ORAL at 17:19

## 2022-10-31 RX ADMIN — APIXABAN 5 MG: 5 TABLET, FILM COATED ORAL at 08:09

## 2022-10-31 RX ADMIN — Medication 10 ML: at 21:18

## 2022-10-31 RX ADMIN — TAMSULOSIN HYDROCHLORIDE 0.4 MG: 0.4 CAPSULE ORAL at 08:09

## 2022-10-31 RX ADMIN — Medication 10 ML: at 08:09

## 2022-10-31 RX ADMIN — GABAPENTIN 300 MG: 300 CAPSULE ORAL at 21:18

## 2022-10-31 RX ADMIN — Medication 1 TABLET: at 08:09

## 2022-10-31 RX ADMIN — METOPROLOL TARTRATE 12.5 MG: 25 TABLET, FILM COATED ORAL at 21:18

## 2022-10-31 RX ADMIN — FUROSEMIDE 40 MG: 40 TABLET ORAL at 08:09

## 2022-10-31 RX ADMIN — HYDRALAZINE HYDROCHLORIDE 5 MG: 25 TABLET, FILM COATED ORAL at 13:38

## 2022-10-31 RX ADMIN — ESCITALOPRAM 10 MG: 10 TABLET, FILM COATED ORAL at 08:09

## 2022-10-31 RX ADMIN — METOPROLOL TARTRATE 12.5 MG: 25 TABLET, FILM COATED ORAL at 08:09

## 2022-10-31 RX ADMIN — DOCUSATE SODIUM 100 MG: 100 CAPSULE ORAL at 21:18

## 2022-10-31 RX ADMIN — Medication 10 ML: at 08:10

## 2022-10-31 RX ADMIN — PANTOPRAZOLE SODIUM 40 MG: 40 TABLET, DELAYED RELEASE ORAL at 06:06

## 2022-10-31 RX ADMIN — HYDRALAZINE HYDROCHLORIDE 5 MG: 25 TABLET, FILM COATED ORAL at 21:18

## 2022-10-31 RX ADMIN — Medication 10 MG: at 21:18

## 2022-10-31 RX ADMIN — POTASSIUM CHLORIDE 40 MEQ: 20 TABLET, EXTENDED RELEASE ORAL at 10:21

## 2022-10-31 RX ADMIN — HYDRALAZINE HYDROCHLORIDE 5 MG: 25 TABLET, FILM COATED ORAL at 06:06

## 2022-10-31 RX ADMIN — Medication 10 ML: at 21:19

## 2022-10-31 RX ADMIN — POTASSIUM CHLORIDE 40 MEQ: 20 TABLET, EXTENDED RELEASE ORAL at 06:06

## 2022-10-31 RX ADMIN — APIXABAN 5 MG: 5 TABLET, FILM COATED ORAL at 21:18

## 2022-10-31 NOTE — PLAN OF CARE
Goal Outcome Evaluation:  Plan of Care Reviewed With: patient, son        Progress: improving  Outcome Evaluation: Patient seen for OT treatment session. She was up in recliner from lunch upon arrival and agreeable to therapy. She participated in UE exercise while seated before transferred to [facility] then to [facility] of bed. Continues to require moderate/maximal assist for functional transfers with rolling walker and significant cues. Fatigued on this date, with frequent rest breaks. Continue plan of care through overall endurance/strengthening, transfer training, and promotion of independence with self care as able to reach goals.

## 2022-10-31 NOTE — THERAPY TREATMENT NOTE
Acute Care - Physical Therapy Treatment Note   [place]     Patient Name: Tre Kinney  : 1932  MRN: [id number]  Today's Date: 10/31/2022   Onset of Illness/Injury or Date of Surgery: 10/06/22  Visit Dx:   No diagnosis found.  Patient Active Problem List   Diagnosis   • Acid reflux   • Ankle arthralgia   • Cardiac conduction disorder   • Closed fracture of distal fibula   • Arteriosclerosis of coronary artery   • Fracture of distal end of tibia   • Elevated cholesterol   • BP (high blood pressure)   • Hypertrophic polyarthritis   • OP (osteoporosis)   • Right heart failure (HCC)   • Heart failure, chronic, right-sided (HCC)   • SOB (shortness of breath)   • Palpitations   • CHF (congestive heart failure) (HCC)   • Congestive heart failure (HCC)   • Precordial pain   • Chronic anticoagulation   • Hypokalemia   • Anemia, unspecified type   • Generalized weakness     Past Medical History:   Diagnosis Date   • Anemia    • Arthritis    • CHF (congestive heart failure) (HCC)    • Chronic kidney disease    • Coronary artery disease    • H/O blood clots    • History of transfusion     no reaction    • Hypertension      Past Surgical History:   Procedure Laterality Date   • ABDOMINAL SURGERY     • APPENDECTOMY     • CARDIAC SURGERY     •  SECTION     • COLONOSCOPY N/A 3/7/2018    Procedure: COLONOSCOPY;  Surgeon: Willie Gresham MD;  Location:  [facility];  Service:    • CORONARY ANGIOPLASTY WITH STENT PLACEMENT     • ENDOSCOPY N/A 3/6/2018    Procedure: ESOPHAGOGASTRODUODENOSCOPY;  Surgeon: Willie Gresham MD;  Location:  [facility];  Service:    • PACEMAKER IMPLANTATION      left side    • SKIN BIOPSY     • TUBAL ABDOMINAL LIGATION       PT Assessment (last 12 hours)     PT Evaluation and Treatment     Row Name 10/31/22 1340          Physical Therapy Time and Intention    Subjective Information complains of;fatigue  -CT     Document Type therapy note (daily note)  -CT     Mode of Treatment  physical therapy  -CT     Patient Effort fair  -CT     Comment Pts treatment session [id number]. Lead RN Ngoc called pts Pelon Dorsey via [employer] with pts permission. Pt performed BLE seated ther ex. OT present to assist with standing and transfer from chair to BSC and then to bed. Pt fatigued today and required Max/Dep x 2 assist with standing and transfer. Pt took 4 small steps during transfer to bed but required Max/Dep x 2 assist as she attempts to sit early in the transfer.  -CT     Row Name 10/31/22 1340          General Information    Patient Profile Reviewed yes  -CT     Existing Precautions/Restrictions fall;other (see comments);TLSO  swan; pt continues to decline use of TLSO  -CT     Row Name 10/31/22 1340          Cognition    Orientation Status (Cognition) oriented to;person;place;verbal cues/prompts needed for orientation;time  -CT     Follows Commands (Cognition) WFL  -CT     Row Name 10/31/22 1340          Bed Mobility    Rolling Left [place] (Bed Mobility) minimum assist (75% patient effort);verbal cues;nonverbal cues (demo/gesture)  -CT     Rolling Right [place] (Bed Mobility) minimum assist (75% patient effort);verbal cues;nonverbal cues (demo/gesture)  -CT     Scooting/Bridging [place] (Bed Mobility) moderate assist (50% patient effort);1 person to manage equipment;verbal cues;nonverbal cues (demo/gesture)  -CT     Supine-Sit [place] (Bed Mobility) moderate assist (50% patient effort);1 person assist;verbal cues;nonverbal cues (demo/gesture)  -CT     Bed Mobility, Safety Issues decreased use of legs for bridging/pushing;decreased use of arms for pushing/pulling;impaired trunk control for bed mobility  log roll  -CT     Assistive Device (Bed Mobility) bed rails;draw sheet  -CT     Row Name 10/31/22 1340          Transfers    Transfers toilet transfer  -CT     Row Name 10/31/22 1340          Chair-Bed Transfer    Chair-Bed [place] (Transfers) maximum assist (25% patient  effort);dependent (less than 25% patient effort);2 person assist;verbal cues;nonverbal cues (demo/gesture)  -CT     Row Name 10/31/22 1340          Sit-Stand Transfer    Sit-Stand [place] (Transfers) maximum assist (25% patient effort);2 person assist;verbal cues;nonverbal cues (demo/gesture)  -CT     Assistive Device (Sit-Stand Transfers) walker, front-wheeled  -CT     Row Name 10/31/22 1340          Stand-Sit Transfer    Stand-Sit [place] (Transfers) maximum assist (25% patient effort);2 person assist;verbal cues;nonverbal cues (demo/gesture)  pt sits before being all the way back to chair  -CT     Assistive Device (Stand-Sit Transfers) walker, front-wheeled  -CT     Row Name 10/31/22 1340          Toilet Transfer    Type (Toilet Transfer) stand pivot/stand step  -CT     [place] Level (Toilet Transfer) maximum assist (25% patient effort);2 person assist;verbal cues;nonverbal cues (demo/gesture)  -CT     Assistive Device (Toilet Transfer) commode, bedside without drop arms;walker, front-wheeled  -CT     Row Name 10/31/22 1340          Gait/Stairs (Locomotion)    [place] Level (Gait) maximum assist (25% patient effort);dependent (less than 25% patient effort);2 person assist;verbal cues;nonverbal cues (demo/gesture)  -CT     Assistive Device (Gait) walker, front-wheeled  -CT     Distance in Feet (Gait) 4 very small steps during transfer  -CT     Pattern (Gait) step-to  -CT     Deviations/Abnormal Patterns (Gait) gait speed decreased;festinating/shuffling;weight shifting decreased  -CT     Bilateral Gait Deviations weight shift ability decreased;forward flexed posture;knee buckling bilaterally  -CT     Comment, (Gait/Stairs) Pt attempts to sit early during transfer  -CT     Row Name 10/31/22 1340          Motor Skills    Therapeutic Exercise --  BLE seated ther ex; LAQ, seated marches, AP, hip abd/add, resisted hip abd red TB, glut sets, hamstring curls red TB 20 reps  -CT     Row Name 10/31/22  1340          Coping    Observed Emotional State calm;cooperative  -CT     Verbalized Emotional State acceptance  -CT     Row Name 10/31/22 1340          Plan of Care Review    Plan of Care Reviewed With patient;son  -CT     Row Name 10/31/22 1340          Positioning and Restraints    Pre-Treatment Position sitting in chair/recliner  -CT     Post Treatment Position bed  -CT     In Bed supine;call light within reach;encouraged to call for assist;exit alarm on;with nsg;side rails up x3  -CT           User Key  (r) = Recorded By, (t) = Taken By, (c) = Cosigned By    Initials Name Provider Type    CT Bhargavi Vásquez, PT Physical Therapist                  PT Recommendation and Plan  Anticipated Discharge Disposition (PT): home with 24/7 care, skilled nursing [facility]  Planned Therapy Interventions (PT): balance training, bed mobility training, gait training, home exercise program, motor coordination training, manual therapy techniques, neuromuscular re-education, postural re-education, patient/family education, strengthening, transfer training  Therapy Frequency (PT): 5 times/wk (5-6 times/wk)  Progress Summary (PT)  Progress Toward Functional Goals (PT): progress toward functional goals is fair  Plan of Care Reviewed With: patient, son  Progress: no change   Outcome Measures     Row Name 10/29/22 1242 10/29/22 1200          How much help from another person do you currently need...    Turning from your back to your side while in flat bed without using bedrails? 2  -KH --     Moving from lying on back to sitting on the side of a flat bed without bedrails? 2  -KH --     Moving to and from a bed to a chair (including a wheelchair)? 2  -KH --     Standing up from a chair using your arms (e.g., wheelchair, bedside chair)? 3  -KH --     Climbing 3-5 steps with a railing? 1  -KH --     To walk in hospital room? 2  Pt able to ambulate short distances however does not tolerate long periods of standing per observation without  knee's buckling/giving way or patient getting tired.  -KH --     AM-PAC 6 Clicks Score (PT) 12  -KH --        How much help from another is currently needed...    Putting on and taking off regular lower body clothing? -- 2  -HB     Bathing (including washing, rinsing, and drying) -- 2  -HB     Toileting (which includes using toilet bed pan or urinal) -- 2  -HB     Putting on and taking off regular upper body clothing -- 3  -HB     Taking care of personal grooming (such as brushing teeth) -- 3  -HB     Eating meals -- 4  -HB     AM-PAC 6 Clicks Score (OT) -- 16  -HB        Functional Assessment    Outcome Measure Options AM-PAC 6 Clicks Basic Mobility (PT)  - --           User Key  (r) = Recorded By, (t) = Taken By, (c) = Cosigned By    Initials Name Provider Type     Radha Vu, PT Physical Therapist    HB Katelynn Fernández, OT Occupational Therapist                 Time Calculation:    PT Charges     Row Name 10/31/22 1350             Time Calculation    Start Time 1250  -CT      Stop Time 1332  -CT      Time Calculation (min) 42 min  -CT      PT Received On 10/31/22  -CT            User Key  (r) = Recorded By, (t) = Taken By, (c) = Cosigned By    Initials Name Provider Type    CT Bhargavi Vásquez, PT Physical Therapist              Therapy Charges for Today     Code Description Service Date Service Provider Modifiers Qty    16964329168 HC GAIT TRAINING EA 15 MIN 10/31/2022 Bhargavi Vásquez, PT GP 1    45513153743 HC PT THER PROC EA 15 MIN 10/31/2022 Bhargavi Vásquez, PT GP 1          PT G-Codes  Outcome Measure Options: AM-PAC 6 Clicks Basic Mobility (PT)  AM-PAC 6 Clicks Score (PT): 11  AM-PAC 6 Clicks Score (OT): 16    Bhargavi Vásquez PT  10/31/2022

## 2022-10-31 NOTE — PROGRESS NOTES
[facility] HOSPITALIST PROGRESS NOTE     Patient Identification:  Name:  Tre Kinney  Age:  89 y.o.  Sex:  female  :  1932  MRN:  [id number]  Visit Number:  [id number]  ROOM: [address] [place] [address]     Primary Care Provider:  Jessica Pedro APRN    Length of stay in inpatient status:  26    Subjective     Chief Compliant:  No chief complaint on file.      History of Presenting Illness:      Pt is doing well, no acute complaints, no acute events. Is still needing a lot of assistance w/ transfers, but is working with PT. Patient is very pleasant, and has no further questions.    Staff report that family have a lot of concerns with the care she is getting at this facility, but pt appears to be grossly decisional. SW requested a psych consult to verify that the pt has decision making capacity in order to help us make decisions on who ultimately needs information on the pt's course, and who can make decisions about the pt's care.    Objective     Current Hospital Meds:  apixaban, 5 mg, Oral, Q12H  docusate sodium, 100 mg, Oral, BID  escitalopram, 10 mg, Oral, Daily  furosemide, 40 mg, Oral, BID  gabapentin, 300 mg, Oral, Nightly  hydrALAZINE, 5 mg, Oral, Q8H  melatonin, 10 mg, Oral, Nightly  metoprolol tartrate, 12.5 mg, Oral, Q12H  multivitamin, 1 tablet, Oral, Daily  pantoprazole, 40 mg, Oral, Q AM  sodium chloride, 10 mL, Intravenous, Q12H  sodium chloride, 10 mL, Intravenous, Q12H  tamsulosin, 0.4 mg, Oral, Daily      Pharmacy Consult,       ----------------------------------------------------------------------------------------------------------------------  Vital Signs:  Temp:  [98 °F (36.7 °C)-98.2 °F (36.8 °C)] 98 °F (36.7 °C)  Heart Rate:  [75-98] 80  Resp:  [18] 18  BP: (130-162)/() 130/80  SpO2:  [94 %-96 %] 94 %  on   ;   Device (Oxygen Therapy): room air  Body mass index is 24.74 kg/m².      Intake/Output Summary (Last 24 hours) at 10/31/2022 5200  Last data filed at 10/31/2022 1100  Gross  per 24 hour   Intake 1560 ml   Output 200 ml   Net 1360 ml      ----------------------------------------------------------------------------------------------------------------------  Physical exam:  Physical Exam  Vitals and nursing note reviewed.   Constitutional:       General: She is not in acute distress.     Appearance: Normal appearance. She is not ill-appearing, toxic-appearing or diaphoretic.   Eyes:      Extraocular Movements: Extraocular movements intact.      Pupils: Pupils are equal, round, and reactive to light.   Cardiovascular:      Rate and Rhythm: Normal rate and regular rhythm.      Heart sounds: Normal heart sounds.   Pulmonary:      Effort: Pulmonary effort is normal. No respiratory distress.      Breath sounds: No wheezing, rhonchi or rales.   Abdominal:      General: Bowel sounds are normal. There is no distension.      Palpations: Abdomen is soft.      Tenderness: There is no abdominal tenderness. There is no guarding.   Musculoskeletal:      Right lower leg: No edema.      Left lower leg: No edema.   Skin:     General: Skin is warm and dry.   Neurological:      General: No focal deficit present.      Mental Status: She is alert and oriented to person, place, and time. Mental status is at baseline.   Psychiatric:         Mood and Affect: Mood normal.         Behavior: Behavior normal.       ----------------------------------------------------------------------------------------------------------------------  WBC/HGB/HCT/PLT   6.90/9.4/30.1/229 (10/31 0259)  BUN/CREAT/GLUC/ALT/AST/MARIO ALBERTO/LIP    24/1.69/104/<5/10/--/-- (10/31 0259)  LYTES - Na/K/Cl/CO2: 141/3.6/103/26.4 (10/31 0259)        No results found for: URINECX  No results found for: BLOODCX    I have personally looked at the labs and they are summarized above.  ----------------------------------------------------------------------------------------------------------------------  Detailed radiology reports for the last 24 hours:  No  radiology results for the last day    Assessment & Plan       Assessment & Plan      Generalized weakness/debility  Diffuse LLE DVT  DIANA on CKD IV  Acute blood loss anemia  Traumatic right gluteal hematoma  Mild hypokalemia   Paroxysmal Afib  Hx of SSS s/p pacemaker placement  Essential HTN  Reported hx of DVT's   Anxiety/depression  Advanced age     - Continue PT/OT  - Psych eval to establish decision making capacity formally    Disposition: Plan currently is still home w/ home health when able, still mod/max assist for function transfers w/ rolling walker, requiring frequent cueing    Kennedi Saucedo MD  10/31/22  16:59 EDT

## 2022-10-31 NOTE — CASE MANAGEMENT/SOCIAL WORK
Discharge Planning Assessment  [facility]     Patient Name: Tre Kinney  MRN: [id number]  Today's Date: 10/31/2022    Admit Date: 10/5/2022       Discharge Plan     Row Name 10/31/22 1207       Plan    Plan Pt is oriented x 4 per nursing documentation on 10/30/22. SS requested a psych consult to be ordered to evaluate pt's decisional making capacity. SS notified Dr. Saucedo. SS will follow up after psych consult is completed to further discuss discharge plan. SS to follow and will continue assisting with discharge planning as needed.               Continued Care and Services - Admitted Since 10/5/2022     Destination     Service Provider Request Status Selected Services Address Phone Fax Patient Preferred    [facility] Accepted N/A [address] [facility] [address] [phone] --    [facility] Pending - Request Sent N/A [address] [facility] [address] [phone] --    [facility] Declined  Does not have a secondary payor source N/A [address] [facility] [place] [address] [phone] --            Selected Continued Care - Prior Encounters Includes continued care and service providers with selected services from prior encounters from 7/7/2022 to 10/31/2022    Discharged on 10/5/2022 Admission date: 9/19/2022 - Discharge disposition: Swing Bed    Destination     Service Provider Selected Services Address Phone Fax Patient Preferred    [facility] SWING BED Skilled Nursing [address] [facility] [place] [address] [phone] -- --                    LENORE Koch

## 2022-10-31 NOTE — CASE MANAGEMENT/SOCIAL WORK
Discharge Planning Assessment  [facility]     Patient Name: Tre Kinney  MRN: [id number]  Today's Date: 10/31/2022    Admit Date: 10/5/2022    Plan: SS spoke with pt who states its ok to discuss her case with her son Willian. SS to follow.      Discharge Plan     Row Name 10/31/22 0959       Plan    Plan SS spoke with pt who states its ok to discuss her case with her son Willian. SS to follow.              Continued Care and Services - Admitted Since 10/5/2022     Destination     Service Provider Request Status Selected Services Address Phone Fax Patient Preferred    [facility] Accepted N/A [address], [place] [id number] [phone] --    [facility] Pending - [facility] [address] [facility] [address] [phone] --    [facility] Declined  Does not have a secondary payor source N/A [address] [place] [id number] [phone] --            Selected Continued Care - Prior Encounters Includes continued care and service providers with selected services from prior encounters from 7/7/2022 to 10/31/2022    Discharged on 10/5/2022 Admission date: 9/19/2022 - Discharge disposition: Swing Bed    Destination     Service Provider Selected Services Address Phone Fax Patient Preferred    [facility] SW[facility] BED Skilled Nursing [address] [facility] [address] [phone] -- --                      XANDER Wills

## 2022-10-31 NOTE — PLAN OF CARE
"Goal Outcome Evaluation:              Outcome Evaluation: Patient rested in bed this shift, stood at the side of the bed with x 2 assist with gait belt and walker in place,  patient sat to the side of the bed for 20 minutes, encouraged patient to sit at bedside chair this morning, patient stated she \"wanted to wait for breakfast\".  Educated patient on importance of ambulation and sitting in the chair.  Potassium replacement started.  No complaints or request at this time. VSS, Will continue plan of care.  "

## 2022-10-31 NOTE — PLAN OF CARE
Goal Outcome Evaluation:  Plan of Care Reviewed With: patient        Progress: no change  Outcome Evaluation: Patient's resting in bed, no c/o, no s/s of distress noted, stable on RA. Pt worked with PT & sat up in the chair throughout the shift. Pt's family was facetimed for PT. Pt voiced no further concerns at this time. Will con't with POC.

## 2022-10-31 NOTE — NURSING NOTE
Asked by therapy to contact patient's son.  With patient's verbal consent, contacted son Willian [phone] via [employer] for video call to view physical therapy session.

## 2022-10-31 NOTE — THERAPY TREATMENT NOTE
Patient Name: Tre Kinney  : 1932    MRN: [id number]                              Today's Date: 10/31/2022       Admit Date: 10/5/2022    Visit Dx: No diagnosis found.  Patient Active Problem List   Diagnosis   • Acid reflux   • Ankle arthralgia   • Cardiac conduction disorder   • Closed fracture of distal fibula   • Arteriosclerosis of coronary artery   • Fracture of distal end of tibia   • Elevated cholesterol   • BP (high blood pressure)   • Hypertrophic polyarthritis   • OP (osteoporosis)   • Right heart failure (HCC)   • Heart failure, chronic, right-sided (HCC)   • SOB (shortness of breath)   • Palpitations   • CHF (congestive heart failure) (HCC)   • Congestive heart failure (HCC)   • Precordial pain   • Chronic anticoagulation   • Hypokalemia   • Anemia, unspecified type   • Generalized weakness     Past Medical History:   Diagnosis Date   • Anemia    • Arthritis    • CHF (congestive heart failure) (HCC)    • Chronic kidney disease    • Coronary artery disease    • H/O blood clots    • History of transfusion     no reaction    • Hypertension      Past Surgical History:   Procedure Laterality Date   • ABDOMINAL SURGERY     • APPENDECTOMY     • CARDIAC SURGERY     •  SECTION     • COLONOSCOPY N/A 3/7/2018    Procedure: COLONOSCOPY;  Surgeon: Willie Gresham MD;  Location:  [facility];  Service:    • CORONARY ANGIOPLASTY WITH STENT PLACEMENT     • ENDOSCOPY N/A 3/6/2018    Procedure: ESOPHAGOGASTRODUODENOSCOPY;  Surgeon: Willie Gresham MD;  Location: [facility] ENDOSCOPY;  Service:    • PACEMAKER IMPLANTATION      left side    • SKIN BIOPSY     • TUBAL ABDOMINAL LIGATION        General Information     Row Name 10/31/22 1258          OT Time and Intention    Document Type therapy note (daily note)  -     Mode of Treatment individual therapy;occupational therapy  pt seen 2080 to 1332 (assisted PT with transfer back to bed)  -     Row Name 10/31/22 1253          General Information     Existing Precautions/Restrictions fall;TLSO  pt has declined use of TLSO  -     Row Name 10/31/22 1250          Cognition    Orientation Status (Cognition) oriented to;person;place;verbal cues/prompts needed for orientation;time  -     Row Name 10/31/22 1250          Safety Issues, Functional Mobility    Impairments Affecting Function (Mobility) balance;strength;endurance/activity tolerance  -           User Key  (r) = Recorded By, (t) = Taken By, (c) = Cosigned By    Initials Name Provider Type     Linn Quevedo OT Occupational Therapist                 Mobility/ADL's     Row Name 10/31/22 1441          Bed Mobility    Bed Mobility sit-supine  -     Sit-Supine [place] (Bed Mobility) maximum assist (25% patient effort);2 person assist  pt fatigued with out of bed activity and attempt to utilize [facility] [place]  -     Bed Mobility, Safety Issues decreased use of legs for bridging/pushing;decreased use of arms for pushing/pulling;impaired trunk control for bed mobility  -     Assistive Device (Bed Mobility) bed rails;draw sheet  -     Row Name 10/31/22 1441          Transfers    Transfers bed-chair transfer;toilet transfer;sit-stand transfer;stand-sit transfer  -     Row Name 10/31/22 1441          Bed-Chair Transfer    Bed-Chair [place] (Transfers) maximum assist (25% patient effort);2 person assist  -     Assistive Device (Bed-Chair Transfers) walker, front-wheeled  -     Comment, (Bed-Chair Transfer) maximal cues for upright positioning for balance and center of gravity of BLEs to maintain standing position  -     Row Name 10/31/22 1441          Sit-Stand Transfer    Sit-Stand [place] (Transfers) maximum assist (25% patient effort);2 person assist;verbal cues;nonverbal cues (demo/gesture)  -     Assistive Device (Sit-Stand Transfers) walker, front-wheeled  -     Comment, (Sit-Stand Transfer) maximal assist X2 with rolling walker and maximal cues for sit to stand from recliner and  BSC during transfers from recliner to BSC to edge of bed  -     Row Name 10/31/22 1441          Stand-Sit Transfer    Stand-Sit [place] (Transfers) maximum assist (25% patient effort);2 person assist;verbal cues;nonverbal cues (demo/gesture)  -     Assistive Device (Stand-Sit Transfers) walker, front-wheeled  -     Row Name 10/31/22 1441          Toilet Transfer    Type (Toilet Transfer) stand pivot/stand step  -     [place] Level (Toilet Transfer) maximum assist (25% patient effort);2 person assist;verbal cues;nonverbal cues (demo/gesture)  -     Assistive Device (Toilet Transfer) commode, bedside without drop arms;walker, front-wheeled  -KP     Comment, (Toilet Transfer) commode placed at 45 degrees angle from recliner; BSC removed while patient standing requiring only posterior steps (2-3) with rolling walker and moderate/maximal assist X2 to reach edge of bed due to patient fatigue  -     Row Name 10/31/22 1441          Toileting Assessment/Training    [place] Level (Toileting) dependent (less than 25% patient effort);toileting skills  -     Assistive Devices (Toileting) commode, bedside without drop arms  -     Comment, (Toileting) swan catheter, 2 person assist with hygiene  -           User Key  (r) = Recorded By, (t) = Taken By, (c) = Cosigned By    Initials Name Provider Type    Linn Richardson OT Occupational Therapist               Obj/Interventions     Row Name 10/31/22 1446          Motor Skills    Motor Skills functional endurance  -KP     Functional Endurance fair minus  -KP     Therapeutic Exercise --  seated BUE exercise with moderate resistance theraband through functional movement patterns X20 reps X2 sets (horizontal abd/add, shoulder extension/seated rows, shoulder flexion overhead); pull to unsupported sitting X10 reps  -           User Key  (r) = Recorded By, (t) = Taken By, (c) = Cosigned By    Initials Name Provider Type    Linn Richardson, OT  Occupational Therapist               Goals/Plan    No documentation.                Clinical Impression     Row Name 10/31/22 1449          Pain Assessment    Pretreatment Pain Rating 0/10 - no pain  -KP     Posttreatment Pain Rating 0/10 - no pain  -KP     Row Name 10/31/22 1449          Plan of Care Review    Plan of Care Reviewed With patient;son  -     Progress improving  -     Outcome Evaluation Patient seen for OT treatment session. She was up in recliner from lunch upon arrival and agreeable to therapy. She participated in UE exercise while seated before transferred to [facility] [place] then to [facility] bed. Continues to require moderate/maximal assist for functional transfers with rolling walker and significant cues. Fatigued on this date, with frequent rest breaks. Continue plan of care through overall endurance/strengthening, transfer training, and promotion of independence with self care as able to reach goals.  -     Row Name 10/31/22 1449          Positioning and Restraints    Pre-Treatment Position sitting in chair/recliner  -KP     Post Treatment Position bed  -KP     In Bed call light within reach;with nsg  -           User Key  (r) = Recorded By, (t) = Taken By, (c) = Cosigned By    Initials Name Provider Type    Linn Richardson, VELASQUEZ Occupational Therapist               Outcome Measures     Row Name 10/31/22 2487          How much help from another is currently needed...    Putting on and taking off regular lower body clothing? 2  -KP     Bathing (including washing, rinsing, and drying) 2  -KP     Toileting (which includes using toilet bed pan or urinal) 2  -KP     Putting on and taking off regular upper body clothing 3  -KP     Taking care of personal grooming (such as brushing teeth) 3  -KP     Eating meals 4  -KP     AM-PAC 6 Clicks Score (OT) 16  -     Row Name 10/31/22 0415          How much help from another person do you currently need...    Turning from your back to your side while in flat bed  without using bedrails? 2  -VJ     Moving from lying on back to sitting on the side of a flat bed without bedrails? 2  -VJ     Moving to and from a bed to a chair (including a wheelchair)? 2  -VJ     Standing up from a chair using your arms (e.g., wheelchair, bedside chair)? 2  -VJ     Climbing 3-5 steps with a railing? 1  -VJ     To walk in hospital room? 2  -VJ     AM-PAC 6 Clicks Score (PT) 11  -VJ     Highest level of mobility 4 --> Transferred to chair/commode  -VJ     Row Name 10/31/22 1457          Functional Assessment    Outcome Measure Options Optimal Instrument  -KP           User Key  (r) = Recorded By, (t) = Taken By, (c) = Cosigned By    Initials Name Provider Type    Alysia Ledbetter RN Registered Nurse    Linn Richardson OT Occupational Therapist                  OT Recommendation and Plan     Plan of Care Review  Plan of Care Reviewed With: patient, son  Progress: improving  Outcome Evaluation: Patient seen for OT treatment session. She was up in recliner from lunch upon arrival and agreeable to therapy. She participated in UE exercise while seated before transferred [place] [facility] then to [facility]. Continues to require moderate/maximal assist for functional transfers with rolling walker and significant cues. Fatigued on this date, with frequent rest breaks. Continue plan of care through overall endurance/strengthening, transfer training, and promotion of independence with self care as able to reach goals.     Time Calculation:    Time Calculation- OT     Row Name 10/31/22 1457             Time Calculation- OT    OT Start Time 1250  -KP      OT Stop Time 1332  -KP      OT Time Calculation (min) 42 min  -KP      OT Received On 10/31/22  -KP         Timed Charges    82764 - OT Therapeutic Exercise Minutes 15  -KP      26668 - OT Therapeutic Activity Minutes 5  -KP         Total Minutes    Timed Charges Total Minutes 20  -KP       Total Minutes 20  -KP            User Key  (r) = Recorded By, (t) =  Taken By, (c) = Cosigned By    Initials Name Provider Type     Linn Quevedo OT Occupational Therapist              Therapy Charges for Today     Code Description Service Date Service Provider Modifiers Qty    80580981055 HC OT THER PROC EA 15 MIN 10/31/2022 Linn Quevedo OT GO 1               Linn Quevedo OT  10/31/2022

## 2022-11-01 LAB — POTASSIUM SERPL-SCNC: 4.3 MMOL/L (ref 3.5–5.2)

## 2022-11-01 PROCEDURE — 84132 ASSAY OF SERUM POTASSIUM: CPT | Performed by: INTERNAL MEDICINE

## 2022-11-01 PROCEDURE — 97116 GAIT TRAINING THERAPY: CPT

## 2022-11-01 PROCEDURE — 97110 THERAPEUTIC EXERCISES: CPT

## 2022-11-01 PROCEDURE — 97530 THERAPEUTIC ACTIVITIES: CPT

## 2022-11-01 RX ORDER — DOCUSATE SODIUM 100 MG/1
100 CAPSULE, LIQUID FILLED ORAL 2 TIMES DAILY PRN
Status: DISCONTINUED | OUTPATIENT
Start: 2022-11-01 | End: 2022-12-13

## 2022-11-01 RX ORDER — POTASSIUM CHLORIDE 1.5 G/1.77G
40 POWDER, FOR SOLUTION ORAL EVERY 4 HOURS
Status: COMPLETED | OUTPATIENT
Start: 2022-11-01 | End: 2022-11-01

## 2022-11-01 RX ADMIN — APIXABAN 5 MG: 5 TABLET, FILM COATED ORAL at 08:20

## 2022-11-01 RX ADMIN — Medication 10 ML: at 21:55

## 2022-11-01 RX ADMIN — Medication 10 ML: at 08:22

## 2022-11-01 RX ADMIN — DOCUSATE SODIUM 100 MG: 100 CAPSULE ORAL at 08:19

## 2022-11-01 RX ADMIN — HYDRALAZINE HYDROCHLORIDE 5 MG: 25 TABLET, FILM COATED ORAL at 06:12

## 2022-11-01 RX ADMIN — APIXABAN 5 MG: 5 TABLET, FILM COATED ORAL at 21:55

## 2022-11-01 RX ADMIN — METOPROLOL TARTRATE 12.5 MG: 25 TABLET, FILM COATED ORAL at 21:55

## 2022-11-01 RX ADMIN — FUROSEMIDE 40 MG: 40 TABLET ORAL at 08:20

## 2022-11-01 RX ADMIN — DOCUSATE SODIUM 100 MG: 100 CAPSULE ORAL at 21:55

## 2022-11-01 RX ADMIN — Medication 1 TABLET: at 08:20

## 2022-11-01 RX ADMIN — PANTOPRAZOLE SODIUM 40 MG: 40 TABLET, DELAYED RELEASE ORAL at 06:13

## 2022-11-01 RX ADMIN — HYDRALAZINE HYDROCHLORIDE 5 MG: 25 TABLET, FILM COATED ORAL at 13:12

## 2022-11-01 RX ADMIN — METOPROLOL TARTRATE 12.5 MG: 25 TABLET, FILM COATED ORAL at 08:20

## 2022-11-01 RX ADMIN — POTASSIUM CHLORIDE 40 MEQ: 1.5 POWDER, FOR SOLUTION ORAL at 08:22

## 2022-11-01 RX ADMIN — HYDRALAZINE HYDROCHLORIDE 5 MG: 25 TABLET, FILM COATED ORAL at 21:55

## 2022-11-01 RX ADMIN — FUROSEMIDE 40 MG: 40 TABLET ORAL at 17:02

## 2022-11-01 RX ADMIN — ESCITALOPRAM 10 MG: 10 TABLET, FILM COATED ORAL at 08:19

## 2022-11-01 RX ADMIN — TAMSULOSIN HYDROCHLORIDE 0.4 MG: 0.4 CAPSULE ORAL at 08:20

## 2022-11-01 RX ADMIN — Medication 10 MG: at 21:55

## 2022-11-01 RX ADMIN — GABAPENTIN 300 MG: 300 CAPSULE ORAL at 21:55

## 2022-11-01 RX ADMIN — POTASSIUM CHLORIDE 40 MEQ: 1.5 POWDER, FOR SOLUTION ORAL at 13:12

## 2022-11-01 NOTE — NURSING NOTE
From: Lola Chow  To: Trudy Islas. Kellen Parada MD  Sent: 5/18/2021 10:56 AM CDT  Subject: Test Results Question    What do my test results of the ultra sound of my liver mean? PT came to work with patient. Attempted to facetime padmaja Dorsey twice (with permission from the pt) to see therapy session; unable to reach. Attempted to call padmaja Dorsey; unable to reach at this time. Called padmaja Burr to let him know patient is working with PT and his brother was not available for facetime; Leno said it was okay to proceed with therapy and he could not  facetime at this time

## 2022-11-01 NOTE — THERAPY TREATMENT NOTE
Patient Name: Tre Kinney  : 1932    MRN: [id number]                              Today's Date: 2022       Admit Date: 10/5/2022    Visit Dx: No diagnosis found.  Patient Active Problem List   Diagnosis   • Acid reflux   • Ankle arthralgia   • Cardiac conduction disorder   • Closed fracture of distal fibula   • Arteriosclerosis of coronary artery   • Fracture of distal end of tibia   • Elevated cholesterol   • BP (high blood pressure)   • Hypertrophic polyarthritis   • OP (osteoporosis)   • Right heart failure (HCC)   • Heart failure, chronic, right-sided (HCC)   • SOB (shortness of breath)   • Palpitations   • CHF (congestive heart failure) (HCC)   • Congestive heart failure (HCC)   • Precordial pain   • Chronic anticoagulation   • Hypokalemia   • Anemia, unspecified type   • Generalized weakness     Past Medical History:   Diagnosis Date   • Anemia    • Arthritis    • CHF (congestive heart failure) (HCC)    • Chronic kidney disease    • Coronary artery disease    • H/O blood clots    • History of transfusion     no reaction    • Hypertension      Past Surgical History:   Procedure Laterality Date   • ABDOMINAL SURGERY     • APPENDECTOMY     • CARDIAC SURGERY     •  SECTION     • COLONOSCOPY N/A 3/7/2018    Procedure: COLONOSCOPY;  Surgeon: Willie Gresham MD;  Location:  [facility];  Service:    • CORONARY ANGIOPLASTY WITH STENT PLACEMENT     • ENDOSCOPY N/A 3/6/2018    Procedure: ESOPHAGOGASTRODUODENOSCOPY;  Surgeon: Willie Gresham MD;  Location: [facility] ENDOSCOPY;  Service:    • PACEMAKER IMPLANTATION      left side    • SKIN BIOPSY     • TUBAL ABDOMINAL LIGATION        General Information     Row Name 22 1429          OT Time and Intention    Document Type therapy note (daily note)  -     Mode of Treatment individual therapy;occupational therapy  1007 - 2370 (assisted PT with transfer)  -     Row Name 22 1429          General Information    Patient Profile Reviewed  yes  -     Existing Precautions/Restrictions fall;other (see comments);TLSO  declining TLSO when up  -     Row Name 11/01/22 1429          Cognition    Orientation Status (Cognition) oriented x 4  -     Row Name 11/01/22 1429          Safety Issues, Functional Mobility    Impairments Affecting Function (Mobility) balance;strength;endurance/activity tolerance  -           User Key  (r) = Recorded By, (t) = Taken By, (c) = Cosigned By    Initials Name Provider Type     Linn Quevedo OT Occupational Therapist                 Mobility/ADL's     Row Name 11/01/22 1432          Transfers    Transfers bed-chair transfer;sit-stand transfer;stand-sit transfer  -     Row Name 11/01/22 1432          Bed-Chair Transfer    Bed-Chair [place] (Transfers) 2 person assist;moderate assist (50% patient effort);maximum assist (25% patient effort)  -     Assistive Device (Bed-Chair Transfers) walker, front-wheeled  -     Comment, (Bed-Chair Transfer) maximal cues for upright positioning and weightshifting anteriorly  -     Row Name 11/01/22 1432          Sit-Stand Transfer    Sit-Stand [place] (Transfers) minimum assist (75% patient effort);moderate assist (50% patient effort);2 person assist;verbal cues;nonverbal cues (demo/gesture)  -     Assistive Device (Sit-Stand Transfers) walker, front-wheeled  -     Row Name 11/01/22 1432          Stand-Sit Transfer    Stand-Sit [place] (Transfers) minimum assist (75% patient effort);moderate assist (50% patient effort);2 person assist;verbal cues;nonverbal cues (demo/gesture)  -     Row Name 11/01/22 1432          Functional Mobility    Functional Mobility- Comment attempted forward functional mobility with rolling walker from recliner due to patient turning to sit when up on other attempts, patient unable to advance LEs with possible fear of falling with new task depsite her declining anxiety  -           User Key  (r) = Recorded By, (t) = Taken By,  (c) = Cosigned By    Initials Name Provider Type    Linn Richardson OT Occupational Therapist               Obj/Interventions     Row Name 11/01/22 1435          Motor Skills    Motor Skills functional endurance  -KP     Functional Endurance fair  -KP     Therapeutic Exercise --  BUE AROM with moderate resistance theraband through functional movement patterns (horizontal abd/add, shoulder extension/rows, and shoulder flexion)  -           User Key  (r) = Recorded By, (t) = Taken By, (c) = Cosigned By    Initials Name Provider Type    Linn Richardson OT Occupational Therapist               Goals/Plan    No documentation.                Clinical Impression     Row Name 11/01/22 1437          Pain Assessment    Pretreatment Pain Rating 0/10 - no pain  -KP     Posttreatment Pain Rating 0/10 - no pain  -KP     Row Name 11/01/22 1437          Plan of Care Review    Plan of Care Reviewed With patient  -     Progress improving  -     Outcome Evaluation Patient seen for OT treatment session. She was assisted from bed to chair using rolling walker with moderate/maximal assist. Fair tolerance for seated UE exercises on this date. Patient with improved alertness and socialization with therapist on this date, overall endurance improved on this date with patient seen before lunch versus afternoon. Continue plan of care.  -           User Key  (r) = Recorded By, (t) = Taken By, (c) = Cosigned By    Initials Name Provider Type    Linn Richardson OT Occupational Therapist               Outcome Measures     Row Name 11/01/22 1438          How much help from another is currently needed...    Putting on and taking off regular lower body clothing? 2  -KP     Bathing (including washing, rinsing, and drying) 2  -KP     Toileting (which includes using toilet bed pan or urinal) 2  -KP     Putting on and taking off regular upper body clothing 3  -KP     Taking care of personal grooming (such as brushing teeth) 3  -KP      Eating meals 4  -KP     AM-PAC 6 Clicks Score (OT) 16  -KP     Row Name 11/01/22 0820          How much help from another person do you currently need...    Turning from your back to your side while in flat bed without using bedrails? 2  -BD     Moving from lying on back to sitting on the side of a flat bed without bedrails? 2  -BD     Moving to and from a bed to a chair (including a wheelchair)? 2  -BD     Standing up from a chair using your arms (e.g., wheelchair, bedside chair)? 2  -BD     Climbing 3-5 steps with a railing? 1  -BD     To walk in hospital room? 2  -BD     AM-PAC 6 Clicks Score (PT) 11  -BD     Highest level of mobility 4 --> Transferred to chair/commode  -BD     Row Name 11/01/22 1438          Functional Assessment    Outcome Measure Options AM-PAC 6 Clicks Daily Activity (OT)  -           User Key  (r) = Recorded By, (t) = Taken By, (c) = Cosigned By    Initials Name Provider Type     Linn Quevedo, VELASQUEZ Occupational Therapist    Radha Lu, RN Registered Nurse                  OT Recommendation and Plan     Plan of Care Review  Plan of Care Reviewed With: patient  Progress: improving  Outcome Evaluation: Patient seen for OT treatment session. She was assisted from bed to chair using rolling walker with moderate/maximal assist. Fair tolerance for seated UE exercises on this date. Patient with improved alertness and socialization with therapist on this date, overall endurance improved on this date with patient seen before lunch versus afternoon. Continue plan of care.     Time Calculation:    Time Calculation- OT     Row Name 11/01/22 1438             Time Calculation- OT    OT Start Time 1007  -      OT Stop Time 1030  -      OT Time Calculation (min) 23 min  -      OT Received On 11/01/22  -         Timed Charges    55388 - OT Therapeutic Exercise Minutes 15  -      67665 - OT Therapeutic Activity Minutes 8  -KP         Total Minutes    Timed Charges Total Minutes 23  -        Total Minutes 23  -KP            User Key  (r) = Recorded By, (t) = Taken By, (c) = Cosigned By    Initials Name Provider Type    Linn Richardson OT Occupational Therapist              Therapy Charges for Today     Code Description Service Date Service Provider Modifiers Qty    71872229286 HC OT THER PROC EA 15 MIN 10/31/2022 Linn Quevedo OT GO 1    00335500164 HC OT THER PROC EA 15 MIN 11/1/2022 Linn Quevedo OT GO 1               Lnin Quevedo OT  11/1/2022

## 2022-11-01 NOTE — PLAN OF CARE
Goal Outcome Evaluation:           Progress: no change  Outcome Evaluation: pt has been resting in bed. K+ replaced per orders. Pt worked with PT today. PT has sat in chair several times today. No other changes to note at this time; will continue to monitor

## 2022-11-01 NOTE — PLAN OF CARE
Goal Outcome Evaluation:              Outcome Evaluation: Patient has rested in bed this shift, no complaints or request at this time, patient currently in bedside chair, VSS, Will continue plan of care.

## 2022-11-01 NOTE — THERAPY TREATMENT NOTE
Acute Care - Physical Therapy Treatment Note   [place]     Patient Name: Tre Kinney  : 1932  MRN: [id number]  Today's Date: 2022   Onset of Illness/Injury or Date of Surgery: 10/06/22  Visit Dx:   No diagnosis found.  Patient Active Problem List   Diagnosis   • Acid reflux   • Ankle arthralgia   • Cardiac conduction disorder   • Closed fracture of distal fibula   • Arteriosclerosis of coronary artery   • Fracture of distal end of tibia   • Elevated cholesterol   • BP (high blood pressure)   • Hypertrophic polyarthritis   • OP (osteoporosis)   • Right heart failure (HCC)   • Heart failure, chronic, right-sided (HCC)   • SOB (shortness of breath)   • Palpitations   • CHF (congestive heart failure) (HCC)   • Congestive heart failure (HCC)   • Precordial pain   • Chronic anticoagulation   • Hypokalemia   • Anemia, unspecified type   • Generalized weakness     Past Medical History:   Diagnosis Date   • Anemia    • Arthritis    • CHF (congestive heart failure) (HCC)    • Chronic kidney disease    • Coronary artery disease    • H/O blood clots    • History of transfusion     no reaction    • Hypertension      Past Surgical History:   Procedure Laterality Date   • ABDOMINAL SURGERY     • APPENDECTOMY     • CARDIAC SURGERY     •  SECTION     • COLONOSCOPY N/A 3/7/2018    Procedure: COLONOSCOPY;  Surgeon: Willie Gresham MD;  Location:  [facility];  Service:    • CORONARY ANGIOPLASTY WITH STENT PLACEMENT     • ENDOSCOPY N/A 3/6/2018    Procedure: ESOPHAGOGASTRODUODENOSCOPY;  Surgeon: Willie Gresham MD;  Location:  [facility];  Service:    • PACEMAKER IMPLANTATION      left side    • SKIN BIOPSY     • TUBAL ABDOMINAL LIGATION       PT Assessment (last 12 hours)     PT Evaluation and Treatment     Row Name 22 1001          Physical Therapy Time and Intention    Subjective Information complains of;fatigue  -CT     Document Type therapy note (daily note)  -CT     Mode of Treatment  physical therapy  -CT     Patient Effort adequate  -CT     Comment Pts treatment session performed [id number]. No family in room. RN attempted to call pts padmaja Dorsey via facetime x 2 and voice call x 1 with no answer. Voicemail was left by RN. Padmaja Burr contacted and informed that treatment session by therapy was being performed but he was unable to be present during treatment. OT present during part of session to assist with transfer and ambulation. Pt transfer to bedside chair today. Attempted ambulation a second time but pt reported being fatigued and was unable to take steps. Pt performed seated ther ex. At the end of session pt performed sit to stand and maintained standing to cleaned d/t bowel movement. Per assessment of notes, pt appears to perform better during AM sessions compared to PM sessions. Pt reports she is less fatigued before lunch.  -CT     Row Name 11/01/22 1001          General Information    Patient Profile Reviewed yes  -CT     Existing Precautions/Restrictions fall;other (see comments);TLSO  swan; pt declines use of TLSO  -CT     Row Name 11/01/22 1001          Cognition    Orientation Status (Cognition) oriented x 4  -CT     Follows Commands (Cognition) WFL  -CT     Row Name 11/01/22 1001          Bed Mobility    Rolling Left [place] (Bed Mobility) minimum assist (75% patient effort);verbal cues;nonverbal cues (demo/gesture)  -CT     Rolling Right [place] (Bed Mobility) minimum assist (75% patient effort);verbal cues;nonverbal cues (demo/gesture)  -CT     Scooting/Bridging [place] (Bed Mobility) minimum assist (75% patient effort);1 person to manage equipment;verbal cues;nonverbal cues (demo/gesture)  -CT     Supine-Sit [place] (Bed Mobility) minimum assist (75% patient effort);1 person assist;verbal cues;nonverbal cues (demo/gesture)  -CT     Bed Mobility, Safety Issues decreased use of legs for bridging/pushing;decreased use of arms for pushing/pulling;impaired trunk  control for bed mobility  log roll  -CT     Assistive Device (Bed Mobility) bed rails;draw sheet  -CT     Row Name 11/01/22 1001          Transfers    Comment, (Transfers) sit to stand performed x 3 during session  -CT     Row Name 11/01/22 1001          Chair-Bed Transfer    Chair-Bed [place] (Transfers) moderate assist (50% patient effort);2 person assist;verbal cues;nonverbal cues (demo/gesture)  -CT     Assistive Device (Chair-Bed Transfers) walker, front-wheeled  -CT     Row Name 11/01/22 1001          Sit-Stand Transfer    Sit-Stand [place] (Transfers) minimum assist (75% patient effort);moderate assist (50% patient effort);2 person assist;verbal cues;nonverbal cues (demo/gesture)  -CT     Assistive Device (Sit-Stand Transfers) walker, front-wheeled  -CT     Row Name 11/01/22 1001          Stand-Sit Transfer    Stand-Sit [place] (Transfers) minimum assist (75% patient effort);moderate assist (50% patient effort);2 person assist;verbal cues;nonverbal cues (demo/gesture)  pt does sit early before being all the way back to chair  -CT     Assistive Device (Stand-Sit Transfers) walker, front-wheeled  -CT     Row Name 11/01/22 1001          Gait/Stairs (Locomotion)    [place] Level (Gait) minimum assist (75% patient effort);moderate assist (50% patient effort);2 person assist;verbal cues;nonverbal cues (demo/gesture)  constant verbal cueing to remain upright  -CT     Assistive Device (Gait) walker, front-wheeled  -CT     Distance in Feet (Gait) 5  -CT     Pattern (Gait) step-to  -CT     Deviations/Abnormal Patterns (Gait) gait speed decreased;festinating/shuffling;weight shifting decreased  -CT     Bilateral Gait Deviations weight shift ability decreased;forward flexed posture;knee buckling bilaterally  -CT     Comment, (Gait/Stairs) pt was unable to ambulate a second time  -CT     Row Name 11/01/22 1001          Motor Skills    Therapeutic Exercise --  BLE seated ther ex; LAQ, seated marchisabel,  AP, hip abd/add, resisted hip abd red TB, glut sets, hamstring curls red TB 20 reps  -CT     Row Name 11/01/22 1001          Coping    Observed Emotional State calm;cooperative  -CT     Verbalized Emotional State acceptance  -CT     Row Name 11/01/22 1001          Plan of Care Review    Plan of Care Reviewed With patient  -CT     Row Name 11/01/22 1001          Positioning and Restraints    Pre-Treatment Position in bed  -CT     Post Treatment Position chair  -CT     In Chair sitting;call light within reach;encouraged to call for assist;with nsg;notified nsg  -CT           User Key  (r) = Recorded By, (t) = Taken By, (c) = Cosigned By    Initials Name Provider Type    CT Bhargavi Vásquez, PT Physical Therapist                  PT Recommendation and Plan  Anticipated Discharge Disposition (PT): home with /7 [facility], [facility] nursing facility  Planned Therapy Interventions (PT): balance training, bed mobility training, gait training, home exercise program, motor coordination training, manual therapy techniques, neuromuscular re-education, postural re-education, patient/family education, strengthening, transfer training  Therapy Frequency (PT): 5 times/wk (5-6 times/wk)  Progress Summary (PT)  Progress Toward Functional Goals (PT): progress toward functional goals is fair  Plan of Care Reviewed With: patient  Progress: no change   Outcome Measures     Row Name 10/29/22 1242 10/29/22 1200          How much help from another person do you currently need...    Turning from your back to your side while in flat bed without using bedrails? 2  -KH --     Moving from lying on back to sitting on the side of a flat bed without bedrails? 2  -KH --     Moving to and from a bed to a chair (including a wheelchair)? 2  -KH --     Standing up from a chair using your arms (e.g., wheelchair, bedside chair)? 3  -KH --     Climbing 3-5 steps with a railing? 1  -KH --     To walk in hospital room? 2  Pt able to ambulate short distances  however does not tolerate long periods of standing per observation without knee's buckling/giving way or patient getting tired.  - --     AM-PAC 6 Clicks Score (PT) 12  -KH --        How much help from another is currently needed...    Putting on and taking off regular lower body clothing? -- 2  -HB     Bathing (including washing, rinsing, and drying) -- 2  -HB     Toileting (which includes using toilet bed pan or urinal) -- 2  -HB     Putting on and taking off regular upper body clothing -- 3  -HB     Taking care of personal grooming (such as brushing teeth) -- 3  -HB     Eating meals -- 4  -HB     AM-PAC 6 Clicks Score (OT) -- 16  -HB        Functional Assessment    Outcome Measure Options AM-PAC 6 Clicks Basic Mobility (PT)  - --           User Key  (r) = Recorded By, (t) = Taken By, (c) = Cosigned By    Initials Name Provider Type     Radha Vu, PT Physical Therapist    HB Katelynn Fernández, OT Occupational Therapist                 Time Calculation:    PT Charges     Row Name 11/01/22 1132             Time Calculation    Start Time 1001  -CT      Stop Time 1055  -CT      Time Calculation (min) 54 min  -CT      PT Received On 11/01/22  -CT            User Key  (r) = Recorded By, (t) = Taken By, (c) = Cosigned By    Initials Name Provider Type    CT Bhargavi Vásquez, PT Physical Therapist              Therapy Charges for Today     Code Description Service Date Service Provider Modifiers Qty    08260506953 HC GAIT TRAINING EA 15 MIN 10/31/2022 Bhargavi Vásquez, PT GP 1    23070838428 HC PT THER PROC EA 15 MIN 10/31/2022 Bhargavi Vásquez, PT GP 1    60752654943 HC GAIT TRAINING EA 15 MIN 11/1/2022 Bhargavi Vásquez, PT GP 1    33871120817 HC PT THER PROC EA 15 MIN 11/1/2022 Bhargavi Vásquez, PT GP 1    [id number]  PT THERAPEUTIC ACT EA 15 MIN 11/1/2022 Bhargavi Vásquez, PT GP 1          PT G-Codes  Outcome Measure Options: Optimal Instrument  AM-PAC 6 Clicks Score (PT): 11  AM-PAC 6 Clicks Score (OT):  16    Bhargavi Vásquez, PT  11/1/2022

## 2022-11-01 NOTE — PLAN OF CARE
Goal Outcome Evaluation:  Plan of Care Reviewed With: patient        Progress: improving  Outcome Evaluation: Patient seen for OT treatment session. She was assisted from bed to chair using rolling walker with moderate/maximal assist. Fair tolerance for seated UE exercises on this date. Patient with improved alertness and socialization with therapist on this date, overall endurance improved on this date with patient seen before lunch versus afternoon. Continue plan of care.

## 2022-11-01 NOTE — PROGRESS NOTES
Pt seen this AM. She is on a swing bed, Weekly note due on 11/7.     Do not see note from Psychiatry in the chart yet. Pt is AOx4, even able to immediately state it was November 1st when asked to name current month.     Staff report that Family wasn't available for Facetime this morning when PT attempted to work with the pt.     Only issue of concern for the pt at the moment was that she had had a BM while sitting in the chair and needing assistance with being cleaned up. Several staff members came in to assist the patient at the end of this providers exam.    A/P:    Continue PT, very slow progress, may need LTC, appreciate psychiatry's assistance to help establish who ultimately can make decisions about the patient, but pt appears very decisional.     Changed constipation meds from scheduled to PRN

## 2022-11-02 PROCEDURE — 97530 THERAPEUTIC ACTIVITIES: CPT

## 2022-11-02 PROCEDURE — 97116 GAIT TRAINING THERAPY: CPT

## 2022-11-02 PROCEDURE — 97110 THERAPEUTIC EXERCISES: CPT

## 2022-11-02 RX ADMIN — PANTOPRAZOLE SODIUM 40 MG: 40 TABLET, DELAYED RELEASE ORAL at 05:48

## 2022-11-02 RX ADMIN — HYDRALAZINE HYDROCHLORIDE 5 MG: 25 TABLET, FILM COATED ORAL at 13:04

## 2022-11-02 RX ADMIN — TAMSULOSIN HYDROCHLORIDE 0.4 MG: 0.4 CAPSULE ORAL at 08:14

## 2022-11-02 RX ADMIN — Medication 10 MG: at 21:39

## 2022-11-02 RX ADMIN — METOPROLOL TARTRATE 12.5 MG: 25 TABLET, FILM COATED ORAL at 08:14

## 2022-11-02 RX ADMIN — FUROSEMIDE 40 MG: 40 TABLET ORAL at 17:01

## 2022-11-02 RX ADMIN — HYDRALAZINE HYDROCHLORIDE 5 MG: 25 TABLET, FILM COATED ORAL at 05:47

## 2022-11-02 RX ADMIN — Medication 10 ML: at 21:39

## 2022-11-02 RX ADMIN — ESCITALOPRAM 10 MG: 10 TABLET, FILM COATED ORAL at 08:14

## 2022-11-02 RX ADMIN — GABAPENTIN 300 MG: 300 CAPSULE ORAL at 21:41

## 2022-11-02 RX ADMIN — Medication 10 ML: at 08:14

## 2022-11-02 RX ADMIN — FUROSEMIDE 40 MG: 40 TABLET ORAL at 08:14

## 2022-11-02 RX ADMIN — Medication 1 TABLET: at 08:14

## 2022-11-02 RX ADMIN — APIXABAN 5 MG: 5 TABLET, FILM COATED ORAL at 08:14

## 2022-11-02 RX ADMIN — METOPROLOL TARTRATE 12.5 MG: 25 TABLET, FILM COATED ORAL at 21:38

## 2022-11-02 RX ADMIN — HYDRALAZINE HYDROCHLORIDE 5 MG: 25 TABLET, FILM COATED ORAL at 21:39

## 2022-11-02 RX ADMIN — APIXABAN 5 MG: 5 TABLET, FILM COATED ORAL at 21:39

## 2022-11-02 NOTE — NURSING NOTE
Pt's family was present on facetime, with permission from the patient, while working with PT and OT. Family and patient agreed that facetimes were no longer required during therapy sessions. Nursing staff and therapy staff present during conversation. Lead nurse Ngoc notified

## 2022-11-02 NOTE — PLAN OF CARE
Goal Outcome Evaluation:           Progress: no change  Outcome Evaluation: Pt has been resting in bed. Pt worked with PT/OT today. PT sat in chair several times today. No other changes to note at this time; will continue to monitor

## 2022-11-02 NOTE — THERAPY TREATMENT NOTE
Acute Care - Physical Therapy Treatment Note   [place]     Patient Name: Tre Kinney  : 1932  MRN: [id number]  Today's Date: 2022   Onset of Illness/Injury or Date of Surgery: 10/06/22  Visit Dx:   No diagnosis found.  Patient Active Problem List   Diagnosis   • Acid reflux   • Ankle arthralgia   • Cardiac conduction disorder   • Closed fracture of distal fibula   • Arteriosclerosis of coronary artery   • Fracture of distal end of tibia   • Elevated cholesterol   • BP (high blood pressure)   • Hypertrophic polyarthritis   • OP (osteoporosis)   • Right heart failure (HCC)   • Heart failure, chronic, right-sided (HCC)   • SOB (shortness of breath)   • Palpitations   • CHF (congestive heart failure) (HCC)   • Congestive heart failure (HCC)   • Precordial pain   • Chronic anticoagulation   • Hypokalemia   • Anemia, unspecified type   • Generalized weakness     Past Medical History:   Diagnosis Date   • Anemia    • Arthritis    • CHF (congestive heart failure) (HCC)    • Chronic kidney disease    • Coronary artery disease    • H/O blood clots    • History of transfusion     no reaction    • Hypertension      Past Surgical History:   Procedure Laterality Date   • ABDOMINAL SURGERY     • APPENDECTOMY     • CARDIAC SURGERY     •  SECTION     • COLONOSCOPY N/A 3/7/2018    Procedure: COLONOSCOPY;  Surgeon: Willie Gresham MD;  Location:  [facility];  Service:    • CORONARY ANGIOPLASTY WITH STENT PLACEMENT     • ENDOSCOPY N/A 3/6/2018    Procedure: ESOPHAGOGASTRODUODENOSCOPY;  Surgeon: Willie Gresham MD;  Location:  [facility];  Service:    • PACEMAKER IMPLANTATION      left side    • SKIN BIOPSY     • TUBAL ABDOMINAL LIGATION       PT Assessment (last 12 hours)     PT Evaluation and Treatment     Row Name 22 1255          Physical Therapy Time and Intention    Subjective Information complains of;fatigue  -CT     Document Type therapy note (daily note)  -CT     Mode of Treatment  physical therapy  -CT     Patient Effort adequate  -CT     Comment Treatment session time [id number]. Pts Pelno Dorsey was facetimed by RN and Pelon Coe was present during end of treatment session. Pt performed sitting ther ex in bedside chair. Pt reports fatigue from being up in chair multiple times today. Pt did ambulate aprox 6 ft this date during transfer to chair. Pt sat EOB to work on sitting balance and activity endurance. Pt then stood and side stepped 2 steps toward head of bed.  -CT     Row Name 11/02/22 1255          General Information    Patient Profile Reviewed yes  -CT     Existing Precautions/Restrictions fall;other (see comments);TLSO  swan; pt declines use of TLSO  -CT     Row Name 11/02/22 1255          Cognition    Orientation Status (Cognition) oriented x 4  -CT     Follows Commands (Cognition) WFL  -CT     Row Name 11/02/22 1255          Bed Mobility    Rolling Left [place] (Bed Mobility) minimum assist (75% patient effort);verbal cues;nonverbal cues (demo/gesture)  -CT     Rolling Right [place] (Bed Mobility) minimum assist (75% patient effort);verbal cues;nonverbal cues (demo/gesture)  -CT     Scooting/Bridging [place] (Bed Mobility) minimum assist (75% patient effort);1 person to manage equipment;verbal cues;nonverbal cues (demo/gesture)  -CT     Supine-Sit [place] (Bed Mobility) minimum assist (75% patient effort);1 person assist;verbal cues;nonverbal cues (demo/gesture)  -CT     Bed Mobility, Safety Issues decreased use of legs for bridging/pushing;decreased use of arms for pushing/pulling;impaired trunk control for bed mobility  log roll  -CT     Assistive Device (Bed Mobility) bed rails;draw sheet  -CT     Row Name 11/02/22 1255          Chair-Bed Transfer    Chair-Bed [place] (Transfers) moderate assist (50% patient effort);2 person assist;verbal cues;nonverbal cues (demo/gesture)  -CT     Assistive Device (Chair-Bed Transfers) walker, front-wheeled  -CT     Row Name  11/02/22 1255          Sit-Stand Transfer    Sit-Stand [place] (Transfers) moderate assist (50% patient effort);2 person assist;verbal cues;nonverbal cues (demo/gesture)  -CT     Assistive Device (Sit-Stand Transfers) walker, front-wheeled  -CT     Row Name 11/02/22 1255          Stand-Sit Transfer    Stand-Sit [place] (Transfers) moderate assist (50% patient effort);2 person assist;verbal cues;nonverbal cues (demo/gesture)  -CT     Assistive Device (Stand-Sit Transfers) walker, front-wheeled  -CT     Row Name 11/02/22 1255          Gait/Stairs (Locomotion)    [place] Level (Gait) minimum assist (75% patient effort);moderate assist (50% patient effort);2 person assist;verbal cues;nonverbal cues (demo/gesture)  constant verbal cueing to remain upright  -CT     Assistive Device (Gait) walker, front-wheeled  -CT     Distance in Feet (Gait) 6 ft during transfer, 2 ft side stepping  -CT     Pattern (Gait) step-to  -CT     Deviations/Abnormal Patterns (Gait) gait speed decreased;festinating/shuffling;weight shifting decreased  -CT     Bilateral Gait Deviations weight shift ability decreased;forward flexed posture;knee buckling bilaterally  -CT     Row Name 11/02/22 1255          Motor Skills    Therapeutic Exercise --  BLE sitting ther ex; LAQ, AP, seated marches, hip abd/add, glut sets 20 reps  -CT     Row Name 11/02/22 1255          Coping    Observed Emotional State calm;cooperative  -CT     Verbalized Emotional State acceptance  -CT     Row Name 11/02/22 1255          Plan of Care Review    Plan of Care Reviewed With patient;son  -CT     Row Name 11/02/22 1255          Positioning and Restraints    Pre-Treatment Position sitting in chair/recliner  -CT     Post Treatment Position bed  -CT     In Bed supine;call light within reach;encouraged to call for assist;exit alarm on;with family/caregiver;side rails up x3  -CT           User Key  (r) = Recorded By, (t) = Taken By, (c) = Cosigned By    Initials Name  Provider Type    CT Bhargavi Vásquez PT Physical Therapist                  PT Recommendation and Plan  Anticipated Discharge Disposition (PT): home with 24/7 [facility], skilled nursing facility  Planned Therapy Interventions (PT): balance training, bed mobility training, gait training, home exercise program, motor coordination training, manual therapy techniques, neuromuscular re-education, postural re-education, patient/family education, strengthening, transfer training  Therapy Frequency (PT): 5 times/wk (5-6 times/wk)  Progress Summary (PT)  Progress Toward Functional Goals (PT): progress toward functional goals is fair  Plan of Care Reviewed With: patient, son  Progress: no change       Time Calculation:    PT Charges     Row Name 11/02/22 1437             Time Calculation    Start Time 1255  -CT      Stop Time 1340  -CT      Time Calculation (min) 45 min  -CT      PT Received On 11/02/22  -CT            User Key  (r) = Recorded By, (t) = Taken By, (c) = Cosigned By    Initials Name Provider Type    CT Bhargavi Vásquez, PT Physical Therapist              Therapy Charges for Today     Code Description Service Date Service Provider Modifiers Qty    18291581476 HC GAIT TRAINING EA 15 MIN 11/1/2022 Bhargavi Vásquez, PT GP 1    53712298482 HC PT THER PROC EA 15 MIN 11/1/2022 Bhargavi Vásquez, PT GP 1    27610389798 HC PT THERAPEUTIC ACT EA 15 MIN 11/1/2022 Bhargavi Vásquez, PT GP 1    [id number] HC GAIT TRAINING EA 15 MIN 11/2/2022 Bhargavi Vásquez, PT GP 1    37086894003 HC PT THER PROC EA 15 MIN 11/2/2022 Bhargavi Vásquez, PT GP 1          PT G-Codes  Outcome Measure Options: AM-PAC 6 Clicks Daily Activity (OT)  AM-PAC 6 Clicks Score (PT): 12  AM-PAC 6 Clicks Score (OT): 16    Bhargavi Vásquez PT  11/2/2022

## 2022-11-02 NOTE — PROGRESS NOTES
Pt seen on rounds this AM.     Pt is having an acute muscle spasm in her left neck. Likely due to sleeping position. Was able to palpate a knotted area of her upper trapezius where it divides along the upper shoulder. Brief gentle massage already started helping the pain. Recommended heat or ice packs, topical muscle rub, and if these didn't help, short course of muscle relaxants.     Still waiting on psych eval    Otherwise pt appears to be doing well.

## 2022-11-02 NOTE — THERAPY TREATMENT NOTE
Acute Care - Occupational Therapy Treatment Note   [place]     Patient Name: Tre Kinney  : 1932  MRN: [id number]  Today's Date: 2022  Onset of Illness/Injury or Date of Surgery: 10/06/22     Referring Physician: Janneth    Admit Date: 10/5/2022     No diagnosis found.  Patient Active Problem List   Diagnosis   • Acid reflux   • Ankle arthralgia   • Cardiac conduction disorder   • Closed fracture of distal fibula   • Arteriosclerosis of coronary artery   • Fracture of distal end of tibia   • Elevated cholesterol   • BP (high blood pressure)   • Hypertrophic polyarthritis   • OP (osteoporosis)   • Right heart failure (HCC)   • Heart failure, chronic, right-sided (HCC)   • SOB (shortness of breath)   • Palpitations   • CHF (congestive heart failure) (HCC)   • Congestive heart failure (HCC)   • Precordial pain   • Chronic anticoagulation   • Hypokalemia   • Anemia, unspecified type   • Generalized weakness     Past Medical History:   Diagnosis Date   • Anemia    • Arthritis    • CHF (congestive heart failure) ([facility])    • Chronic kidney disease    • Coronary artery disease    • H/O blood clots    • History of transfusion     no reaction    • Hypertension      Past Surgical History:   Procedure Laterality Date   • ABDOMINAL SURGERY     • APPENDECTOMY     • CARDIAC SURGERY     •  SECTION     • COLONOSCOPY N/A 3/7/2018    Procedure: COLONOSCOPY;  Surgeon: Willie Gresham MD;  Location:  [facility];  Service:    • CORONARY ANGIOPLASTY WITH STENT PLACEMENT     • ENDOSCOPY N/A 3/6/2018    Procedure: ESOPHAGOGASTRODUODENOSCOPY;  Surgeon: iWllie Gresham MD;  Location:  [facility];  Service:    • PACEMAKER IMPLANTATION      left side    • SKIN BIOPSY     • TUBAL ABDOMINAL LIGATION           OT ASSESSMENT FLOWSHEET (last 12 hours)     OT Evaluation and Treatment     Row Name 22 1345                   OT Time and Intention    Document Type therapy note (daily note)  -KR        Mode of  Treatment occupational therapy  -KR        Symptoms Noted During/After Treatment fatigue  -KR        Comment Pt seen on this date for BUE ther ex to increase strength/endurance needed for performance of self care/mobility tasks. Mobility skills also addressed while assisting PT for fxl transfer training/ambulation training.  -KR           General Information    General Observations of Patient alert/cooperative  -KR           Cognition    Affect/Mental Status (Cognition) WFL  -KR        Follows Commands (Cognition) WFL  -KR           Transfer Assessment/Treatment    Transfers chair-bed transfer  -KR           Chair-Bed Transfer    Chair-Bed [place] (Transfers) moderate assist (50% patient effort);2 person assist  -KR        Assistive Device (Chair-Bed Transfers) walker, front-wheeled  -KR        Comment, (Chair-Bed Transfer) consistent verbal/physical cues needed to achieve appropriate posture/positioning  -KR           Shoulder (Therapeutic Exercise)    Shoulder (Therapeutic Exercise) AROM (active range of motion);wand exercises;strengthening exercise  -KR        Shoulder AROM (Therapeutic Exercise) bilateral;flexion;extension;10 repetitions;3 sets  -KR        Shoulder Strengthening (Therapeutic Exercise) bilateral;flexion;extension;yellow;resistance band  -KR        Shoulder Wand Exercises (Therapeutic Exercise) horizontal aBduction/aDduction;10 repetitions;AROM (active range of motion)  -KR           Elbow/Forearm (Therapeutic Exercise)    Elbow/Forearm (Therapeutic Exercise) AROM (active range of motion);strengthening exercise  -KR        Elbow/Forearm Strengthening (Therapeutic Exercise) bilateral;extension;yellow;resistance band  -KR           Hand (Therapeutic Exercise)    Hand (Therapeutic Exercise) strengthening exercise  -KR        Hand AROM/AAROM (Therapeutic Exercise) bilateral  -KR        Hand Strengthening (Therapeutic Exercise) bilateral; strengthening  -KR           Therapy Assessment/Plan  (OT)    Comment, Therapy Assessment/Plan (OT) Nsg assisted communication with family (son) via [employer], other family (son) also joined therapy session in person as mobility training began. OT to continue as tolerated.  -KR              User Key  (r) = Recorded By, (t) = Taken By, (c) = Cosigned By    Initials Name Effective Dates    Andres Ayala OT 06/16/21 -                        OT Recommendation and Plan           Outcome Measures     Row Name 11/02/22 1345             How much help from another is currently needed...    Putting on and taking off regular lower body clothing? 2  -KR      Bathing (including washing, rinsing, and drying) 2  -KR      Toileting (which includes using toilet bed pan or urinal) 2  -KR      Putting on and taking off regular upper body clothing 3  -KR      Taking care of personal grooming (such as brushing teeth) 3  -KR      Eating meals 4  -KR      AM-PAC 6 Clicks Score (OT) 16  -KR            User Key  (r) = Recorded By, (t) = Taken By, (c) = Cosigned By    Initials Name Provider Type    Andres Ayala OT Occupational Therapist                Time Calculation:    Time Calculation- OT     Row Name 11/02/22 1613             Time Calculation- OT    Total Timed Code Minutes- OT 15 minute(s)  -KR            User Key  (r) = Recorded By, (t) = Taken By, (c) = Cosigned By    Initials Name Provider Type    Andres Ayala OT Occupational Therapist              Therapy Charges for Today     Code Description Service Date Service Provider Modifiers Qty    17139968913  OT THERAPEUTIC ACT EA 15 MIN 11/2/2022 Andres Uriarte OT GO 1               Andres Uriarte OT  11/2/2022

## 2022-11-02 NOTE — PLAN OF CARE
Goal Outcome Evaluation:              Outcome Evaluation: Patient has rested in bed this shift, sat on the edge of the bed and currently up at bedside chair with  assistance x 2 gait belt and walker, alert and oriented x 4, no complaints or request at this time, VSS, Will continue plan of care.

## 2022-11-02 NOTE — CASE MANAGEMENT/SOCIAL WORK
Discharge Planning Assessment  [facility]     Patient Name: Tre Kinney  MRN: [id number]  Today's Date: 11/2/2022    Admit Date: 10/5/2022     Discharge Plan     Row Name 11/02/22 1340       Plan    Plan Psych consult for decisional making capacity that was ordered on 10/31/22 has not be completed. SS discussed psych consult with moore clerk and she will follow up with psych. SS to follow.              Continued Care and Services - Admitted Since 10/5/2022     Destination     Service Provider Request Status Selected Services Address Phone Fax Patient Preferred    [facility] Accepted N/A [address] [facility] [address] [phone] --    [facility] Pending - Request Sent N/A [address] [facility] [address] [phone] --    [facility] Declined  Does not have a secondary payor source N/A [address] [facility] [address] [phone] --            Selected Continued Care - Prior Encounters Includes continued care and service providers with selected services from prior encounters from 7/7/2022 to 11/2/2022    Discharged on 10/5/2022 Admission date: 9/19/2022 - Discharge disposition: Swing Bed    Destination     Service Provider Selected Services Address Phone Fax Patient Preferred    [facility] [address] [facility] [id number] [phone] -- --                    LENORE Koch

## 2022-11-03 PROCEDURE — 97530 THERAPEUTIC ACTIVITIES: CPT

## 2022-11-03 PROCEDURE — 99221 1ST HOSP IP/OBS SF/LOW 40: CPT | Performed by: PSYCHIATRY & NEUROLOGY

## 2022-11-03 PROCEDURE — 97110 THERAPEUTIC EXERCISES: CPT

## 2022-11-03 RX ADMIN — METOPROLOL TARTRATE 12.5 MG: 25 TABLET, FILM COATED ORAL at 08:38

## 2022-11-03 RX ADMIN — ESCITALOPRAM 10 MG: 10 TABLET, FILM COATED ORAL at 08:39

## 2022-11-03 RX ADMIN — HYDRALAZINE HYDROCHLORIDE 5 MG: 25 TABLET, FILM COATED ORAL at 21:27

## 2022-11-03 RX ADMIN — Medication 10 MG: at 21:27

## 2022-11-03 RX ADMIN — Medication 1 TABLET: at 08:39

## 2022-11-03 RX ADMIN — GABAPENTIN 300 MG: 300 CAPSULE ORAL at 21:27

## 2022-11-03 RX ADMIN — FUROSEMIDE 40 MG: 40 TABLET ORAL at 08:39

## 2022-11-03 RX ADMIN — PANTOPRAZOLE SODIUM 40 MG: 40 TABLET, DELAYED RELEASE ORAL at 05:06

## 2022-11-03 RX ADMIN — APIXABAN 5 MG: 5 TABLET, FILM COATED ORAL at 08:39

## 2022-11-03 RX ADMIN — Medication: at 05:48

## 2022-11-03 RX ADMIN — Medication 10 ML: at 09:21

## 2022-11-03 RX ADMIN — METOPROLOL TARTRATE 12.5 MG: 25 TABLET, FILM COATED ORAL at 21:27

## 2022-11-03 RX ADMIN — HYDRALAZINE HYDROCHLORIDE 5 MG: 25 TABLET, FILM COATED ORAL at 05:07

## 2022-11-03 RX ADMIN — FUROSEMIDE 40 MG: 40 TABLET ORAL at 18:35

## 2022-11-03 RX ADMIN — HYDRALAZINE HYDROCHLORIDE 5 MG: 25 TABLET, FILM COATED ORAL at 16:04

## 2022-11-03 RX ADMIN — APIXABAN 5 MG: 5 TABLET, FILM COATED ORAL at 21:27

## 2022-11-03 RX ADMIN — TAMSULOSIN HYDROCHLORIDE 0.4 MG: 0.4 CAPSULE ORAL at 08:39

## 2022-11-03 RX ADMIN — Medication 10 ML: at 21:27

## 2022-11-03 RX ADMIN — Medication 10 ML: at 21:28

## 2022-11-03 NOTE — THERAPY TREATMENT NOTE
Acute Care - Occupational Therapy Treatment Note   [facility]     Patient Name: Tre Kinney  : 1932  MRN: [id number]  Today's Date: 11/3/2022  Onset of Illness/Injury or Date of Surgery: 10/06/22     Referring Physician: Janneth    Admit Date: 10/5/2022     No diagnosis found.  Patient Active Problem List   Diagnosis   • Acid reflux   • Ankle arthralgia   • Cardiac conduction disorder   • Closed fracture of distal fibula   • Arteriosclerosis of coronary artery   • Fracture of distal end of tibia   • Elevated cholesterol   • BP (high blood pressure)   • Hypertrophic polyarthritis   • OP (osteoporosis)   • Right heart failure (HCC)   • Heart failure, chronic, right-sided (HCC)   • SOB (shortness of breath)   • Palpitations   • CHF (congestive heart failure) (HCC)   • Congestive heart failure (HCC)   • Precordial pain   • Chronic anticoagulation   • Hypokalemia   • Anemia, unspecified type   • Generalized weakness     Past Medical History:   Diagnosis Date   • Anemia    • Arthritis    • CHF (congestive heart failure) ([facility])    • Chronic kidney disease    • Coronary artery disease    • H/O blood clots    • History of transfusion     no reaction    • Hypertension      Past Surgical History:   Procedure Laterality Date   • ABDOMINAL SURGERY     • APPENDECTOMY     • CARDIAC SURGERY     •  SECTION     • COLONOSCOPY N/A 3/7/2018    Procedure: COLONOSCOPY;  Surgeon: Willie Gresham MD;  Location:  [facility];  Service:    • CORONARY ANGIOPLASTY WITH STENT PLACEMENT     • ENDOSCOPY N/A 3/6/2018    Procedure: ESOPHAGOGASTRODUODENOSCOPY;  Surgeon: Willie Gresham MD;  Location:  [facility];  Service:    • PACEMAKER IMPLANTATION      left side    • SKIN BIOPSY     • TUBAL ABDOMINAL LIGATION           OT ASSESSMENT FLOWSHEET (last 12 hours)     OT Evaluation and Treatment     Row Name 22 1315                   OT Time and Intention    Document Type therapy note (daily note)  -KR        Mode of  Treatment occupational therapy  -KR        Patient Effort adequate  -KR        Comment Pt seen on this date for BUE AROM from seated in chair. [place] ther ex for horizontal press 10 x 2, flexion 10 x 2. PROM BUE shoulder flex/ext, horizontal abd/add, elbow flexion/ext  -KR           General Information    General Observations of Patient alert/cooperative  -KR           Cognition    Follows Commands (Cognition) WFL  -KR           Transfer Assessment/Treatment    Transfers sit-stand transfer  -KR           Sit-Stand Transfer    Sit-Stand [place] (Transfers) minimum assist (75% patient effort);2 person assist  -KR           Plan of Care Review    Plan of Care Reviewed With patient;son  -KR           Positioning and Restraints    Pre-Treatment Position sitting in chair/recliner  -KR        Post Treatment Position bed  -KR        In Bed with other staff  -KR           Therapy Assessment/Plan (OT)    Comment, Therapy Assessment/Plan (OT) Pt seen by OT for UE ther ex prior to PT arrival. OT then assisted PT for mobility training. Communication with sonWillian, via [employer] OT to continue as tolerated.  -KR              User Key  (r) = Recorded By, (t) = Taken By, (c) = Cosigned By    Initials Name Effective Dates    KR Andres Uriarte, OT 06/16/21 -                        OT Recommendation and Plan     Plan of Care Review  Plan of Care Reviewed With: patient, son  Plan of Care Reviewed With: patient, son     Outcome Measures     Row Name 11/02/22 9922             How much help from another is currently needed...    Putting on and taking off regular lower body clothing? 2  -KR      Bathing (including washing, rinsing, and drying) 2  -KR      Toileting (which includes using toilet bed pan or urinal) 2  -KR      Putting on and taking off regular upper body clothing 3  -KR      Taking care of personal grooming (such as brushing teeth) 3  -KR      Eating meals 4  -KR      AM-PAC 6 Clicks Score (OT) 16  -KR            User Key   (r) = Recorded By, (t) = Taken By, (c) = Cosigned By    Initials Name Provider Type    KR Andres Uriarte OT Occupational Therapist                Time Calculation:     Therapy Charges for Today     Code Description Service Date Service Provider Modifiers Qty    90546320323 HC OT THERAPEUTIC ACT EA 15 MIN 11/2/2022 Andres Uriarte, OT GO 1    21510762302 HC OT THERAPEUTIC ACT EA 15 MIN 11/3/2022 Andres Uriarte OT GO 1               Andres Uriarte OT  11/3/2022

## 2022-11-03 NOTE — THERAPY TREATMENT NOTE
Acute Care - Physical Therapy Treatment Note   [facility]     Patient Name: Tre Kinney  : 1932  MRN: [id number]  Today's Date: 11/3/2022   Onset of Illness/Injury or Date of Surgery: 10/06/22  Visit Dx:   No diagnosis found.  Patient Active Problem List   Diagnosis   • Acid reflux   • Ankle arthralgia   • Cardiac conduction disorder   • Closed fracture of distal fibula   • Arteriosclerosis of coronary artery   • Fracture of distal end of tibia   • Elevated cholesterol   • BP (high blood pressure)   • Hypertrophic polyarthritis   • OP (osteoporosis)   • Right heart failure (HCC)   • Heart failure, chronic, right-sided (HCC)   • SOB (shortness of breath)   • Palpitations   • CHF (congestive heart failure) (HCC)   • Congestive heart failure (HCC)   • Precordial pain   • Chronic anticoagulation   • Hypokalemia   • Anemia, unspecified type   • Generalized weakness     Past Medical History:   Diagnosis Date   • Anemia    • Arthritis    • CHF (congestive heart failure) (HCC)    • Chronic kidney disease    • Coronary artery disease    • H/O blood clots    • History of transfusion     no reaction    • Hypertension      Past Surgical History:   Procedure Laterality Date   • ABDOMINAL SURGERY     • APPENDECTOMY     • CARDIAC SURGERY     •  SECTION     • COLONOSCOPY N/A 3/7/2018    Procedure: COLONOSCOPY;  Surgeon: Willie Gresham MD;  Location:  [facility];  Service:    • CORONARY ANGIOPLASTY WITH STENT PLACEMENT     • ENDOSCOPY N/A 3/6/2018    Procedure: ESOPHAGOGASTRODUODENOSCOPY;  Surgeon: Willie Gresham MD;  Location:  [facility];  Service:    • PACEMAKER IMPLANTATION      left side    • SKIN BIOPSY     • TUBAL ABDOMINAL LIGATION       PT Assessment (last 12 hours)     PT Evaluation and Treatment     Row Name 22 1310          Physical Therapy Time and Intention    Document Type therapy note (daily note)  -     Mode of Treatment physical therapy  -     Patient Effort adequate  -      Comment Pt seen for PT treatment this date with OT assist, padmaja Dorsey available to [facility] during treatment. Pt worked on STSx2-3 and performed LE TE sitting EOB and one exercise supine. Pt fatigued this date and seen for approximately 15 minutes.  -     Row Name 11/03/22 1310          Bed Mobility    Bed Mobility sit-supine  -     Supine-Sit [place] (Bed Mobility) --  -     Sit-Supine [place] (Bed Mobility) maximum assist (25% patient effort);dependent (less than 25% patient effort);2 person assist  OT assisting this date with transfer to supine, max/depA given.  -     Row Name 11/03/22 1310          Transfers    Transfers sit-stand transfer;stand-sit transfer;chair-bed transfer  -     Row Name 11/03/22 1310          Chair-Bed Transfer    Chair-Bed [place] (Transfers) minimum assist (75% patient effort);moderate assist (50% patient effort)  -     Assistive Device (Chair-Bed Transfers) walker, front-wheeled  -     Row Name 11/03/22 1310          Sit-Stand Transfer    Sit-Stand [place] (Transfers) minimum assist (75% patient effort);moderate assist (50% patient effort);2 person assist  -     Comment, (Sit-Stand Transfer) Pt demonstrated at least one standing instance with minAx2 at EOB.  -     Row Name 11/03/22 1310          Stand-Sit Transfer    Stand-Sit [place] (Transfers) contact guard;minimum assist (75% patient effort);moderate assist (50% patient effort)  -     Row Name 11/03/22 1310          Gait/Stairs (Locomotion)    Comment, (Gait/Stairs) Pt able to take steps to transfer from chair to bed  -     Row Name 11/03/22 1310          Motor Skills    Therapeutic Exercise hip  pt performed marches EOB and hip abduction EOB; glute sets 4x10 in supine.  -     Row Name 11/03/22 1310          Positioning and Restraints    Pre-Treatment Position sitting in chair/recliner  -     Post Treatment Position bed  -     In Bed supine;call light within reach  -            User Key  (r) = Recorded By, (t) = Taken By, (c) = Cosigned By    Initials Name Provider Type    Radha Yusuf, PT Physical Therapist                  PT Recommendation and Plan     Outcome Evaluation: Pt's son Willian was available via [employer] where he witnessed portion of therapy session. OT assisted with transfer from chair to bed.    Outcome Measures     Row Name 11/03/22 1310 11/02/22 1345          How much help from another person do you currently need...    Turning from your back to your side while in flat bed without using bedrails? 2  -KH --     Moving from lying on back to sitting on the side of a flat bed without bedrails? 2  -KH --     Moving to and from a bed to a chair (including a wheelchair)? 2  -KH --     Standing up from a chair using your arms (e.g., wheelchair, bedside chair)? 2  -KH --     Climbing 3-5 steps with a railing? 2  -KH --     To walk in hospital room? 2  -KH --     AM-PAC 6 Clicks Score (PT) 12  -KH --        How much help from another is currently needed...    Putting on and taking off regular lower body clothing? -- 2  -KR     Bathing (including washing, rinsing, and drying) -- 2  -KR     Toileting (which includes using toilet bed pan or urinal) -- 2  -KR     Putting on and taking off regular upper body clothing -- 3  -KR     Taking care of personal grooming (such as brushing teeth) -- 3  -KR     Eating meals -- 4  -KR     AM-PAC 6 Clicks Score (OT) -- 16  -KR        Functional Assessment    Outcome Measure Options AM-PAC 6 Clicks Basic Mobility (PT)  - --           User Key  (r) = Recorded By, (t) = Taken By, (c) = Cosigned By    Initials Name Provider Type    Andres Ayala OT Occupational Therapist    Radha Yusuf, PT Physical Therapist                 Time Calculation:    PT Charges     Row Name 11/03/22 1607             Time Calculation    Start Time 1310  -      PT Received On 11/03/22  -         Time Calculation- PT    Total Timed Code Minutes- PT 15  minute(s)  -SURESH            User Key  (r) = Recorded By, (t) = Taken By, (c) = Cosigned By    Initials Name Provider Type    Radha Yusuf, PT Physical Therapist              Therapy Charges for Today     Code Description Service Date Service Provider Modifiers Qty    25187072675 HC PT THER PROC EA 15 MIN 11/3/2022 Radha Vu, EDEL GP 1          PT G-Codes  Outcome Measure Options: AM-PAC 6 Clicks Basic Mobility (PT)  AM-PAC 6 Clicks Score (PT): 12  AM-PAC 6 Clicks Score (OT): 16    Radha Vu PT  11/3/2022

## 2022-11-03 NOTE — PLAN OF CARE
Goal Outcome Evaluation:  Plan of Care Reviewed With: patient        Progress: no change  Outcome Evaluation: Pt resting well in bed at this time. No complaints or acute changes. VSS. Will continue with plan of care.

## 2022-11-03 NOTE — PROGRESS NOTES
Nutrition Services    Patient Name:  Tre Kinney  YOB: 1932  MRN: [id number]  Admit Date:  10/5/2022    BMI:  25.68  Diet: Soft texture, chopped, thin liquids  Supplement Magic Cup BID  Intake: 69% avg over 9 meals  Fluid: not meeting needs.    Electronically signed by:  Jose Oneal RD  11/03/22 15:37 EDT

## 2022-11-03 NOTE — CASE MANAGEMENT/SOCIAL WORK
Discharge Planning Assessment  [facility]     Patient Name: Tre Kinney  MRN: [id number]  Today's Date: 11/3/2022    Admit Date: 10/5/2022    Plan: Psych saw pt on this date. Per Dr. Chapman's note patient does have capacity for medical decision making. SS to follow.      Discharge Plan     Row Name 11/03/22 1329       Plan    Plan Psych saw pt on this date. Per Dr. Chapman's note patient does have capacity for medical decision making. SS to follow.              Continued Care and Services - Admitted Since 10/5/2022     Destination     Service Provider Request Status Selected Services Address Phone Fax Patient Preferred    [facility] Accepted N/A [address] [place] [place] [address] [phone] --    [facility] Pending - [facility] Sent N[facility]A [address] [place] [address] [phone] --    [facility] Declined  Does not have a secondary payor source N/A [address] [facility] [address] [phone] --            Selected Continued Care - Prior Encounters Includes continued care and service providers with selected services from prior encounters from 7/7/2022 to 11/3/2022    Discharged on 10/5/2022 Admission date: 9/19/2022 - Discharge disposition: Swing Bed    Destination     Service Provider Selected Services Address Phone Fax Patient Preferred    [facility] [address] [phone] -- --                        XANDER Wills

## 2022-11-03 NOTE — CONSULTS
Referring Provider: Lewis  Reason for Consultation: Capacity      Chief complaint/Focus of Exam: Capacity Evaluation     Subjective .     History of present illness: Patient is an 89-year-old female with a history significant for CHF, CKD, CAD, history of clots, chronic anticoagulation, sick sinus syndrome with pacer placement, chronic diastolic CHF, anemia, and depression who originally admitted to the hospital after a fall.  Psychiatry was originally consulted early in her hospitalization 1 month ago for depressive symptoms and for medication management in light of hyponatremia.  I evaluated patient at that time.  Psychiatry was reconsulted to evaluate patient for medical decision-making capacity as there have been some concerns about treatment care planning going forward and family has been involved.  I evaluated patient at bedside today while she was eating lunch.  She was sitting up and reported that she was having some neck pain but denied any other complaints.  She was alert and oriented x4.  She is able to answer all my questions appropriately.  She was not experiencing any confusion, hallucinations, delusions, or paranoia.  She denied SI/HI/AVH.  She was making jokes about being in the hospital and seemed to be in good spirits despite her neck pain.    Review of Systems  Pertinent items are noted in HPI    History  Past Medical History:   Diagnosis Date   • Anemia    • Arthritis    • CHF (congestive heart failure) (HCC)    • Chronic kidney disease    • Coronary artery disease    • H/O blood clots    • History of transfusion     no reaction    • Hypertension    ,   Past Surgical History:   Procedure Laterality Date   • ABDOMINAL SURGERY     • APPENDECTOMY     • CARDIAC SURGERY     •  SECTION     • COLONOSCOPY N/A 3/7/2018    Procedure: COLONOSCOPY;  Surgeon: Willie Gresham MD;  Location: [facility] ENDOSCOPY;  Service:    • CORONARY ANGIOPLASTY WITH STENT PLACEMENT     • ENDOSCOPY N/A 3/6/2018     Procedure: ESOPHAGOGASTRODUODENOSCOPY;  Surgeon: Willie Gresham MD;  [facility];  Service:    • PACEMAKER IMPLANTATION      left side    • SKIN BIOPSY     • TUBAL ABDOMINAL LIGATION     ,   Family History   Problem Relation Age of Onset   • Other Mother         cardiac disorder   • Hypertension Mother    • Heart disease Mother    • Thyroid disease Mother    • Other Father         cardiac disorder   • Heart disease Father    • Hypertension Father    • Other Sister         cardiac disorder   • Other Brother         cardiac disorder   • Other Other         cardiac disorder   • Other Other         cardiac disorder   ,   Social History     Socioeconomic History   • Marital status:    Tobacco Use   • Smoking status: Never   Substance and Sexual Activity   • Alcohol use: No   • Drug use: No   • Sexual activity: Defer     E-cigarette/Vaping     E-cigarette/Vaping Substances     E-cigarette/Vaping Devices       ,   Medications Prior to Admission   Medication Sig Dispense Refill Last Dose   • ALPRAZolam (XANAX) 1 MG tablet Take 1 mg by mouth 2 (Two) Times a Day As Needed for Anxiety.   Unknown at Unknown time   • aspirin 81 MG EC tablet Take 81 mg by mouth Daily.   Unknown at Unknown time   • escitalopram (LEXAPRO) 10 MG tablet Take 10 mg by mouth Daily.   Unknown at Unknown time   • furosemide (LASIX) 40 MG tablet Take 40 mg by mouth Daily.   Unknown at Unknown time   • gabapentin (NEURONTIN) 400 MG capsule Take 400 mg by mouth Every Night.   Unknown at Unknown time   • hydrALAZINE (APRESOLINE) 100 MG tablet Take 1 tablet by mouth Every 8 (Eight) Hours. 90 tablet 0 Unknown at Unknown time   • HYDROcodone-acetaminophen (NORCO) 7.5-325 MG per tablet Take 1 tablet by mouth Every 8 (Eight) Hours As Needed for Moderate Pain.   Unknown at Unknown time   • isosorbide mononitrate (IMDUR) 60 MG 24 hr tablet Take 60 mg by mouth Daily.   Unknown at Unknown time   • metoprolol tartrate (LOPRESSOR) 50 MG tablet  Take 50 mg by mouth 2 (Two) Times a Day.   Unknown at Unknown time   • pantoprazole (PROTONIX) 40 MG EC tablet Take 40 mg by mouth Daily.   Unknown at Unknown time   • potassium chloride (MICRO-K) 10 MEQ CR capsule Take 10 mEq by mouth 2 (Two) Times a Day.   Unknown at Unknown time   • warfarin (COUMADIN) 6 MG tablet Take 6 mg by mouth Daily.   Unknown at Unknown time   , Scheduled Meds:  apixaban, 5 mg, Oral, Q12H  escitalopram, 10 mg, Oral, Daily  furosemide, 40 mg, Oral, BID  gabapentin, 300 mg, Oral, Nightly  hydrALAZINE, 5 mg, Oral, Q8H  melatonin, 10 mg, Oral, Nightly  metoprolol tartrate, 12.5 mg, Oral, Q12H  multivitamin, 1 tablet, Oral, Daily  pantoprazole, 40 mg, Oral, Q AM  sodium chloride, 10 mL, Intravenous, Q12H  sodium chloride, 10 mL, Intravenous, Q12H  tamsulosin, 0.4 mg, Oral, Daily    , Continuous Infusions:  Pharmacy Consult,     , PRN Meds:  •  benzocaine-menthol  •  docusate sodium  •  HYDROcodone-acetaminophen  •  magnesium sulfate **OR** magnesium sulfate **OR** magnesium sulfate  •  ondansetron  •  Pharmacy Consult  •  phenol  •  polyethylene glycol  •  potassium chloride **OR** potassium chloride **OR** potassium chloride  •  prochlorperazine **OR** prochlorperazine **OR** prochlorperazine  •  promethazine  •  sodium chloride  •  sodium chloride  •  sodium chloride and Allergies:  Adhesive tape and Xanax [alprazolam]    Objective     Vital Signs   Temp:  [98.2 °F (36.8 °C)-98.4 °F (36.9 °C)] 98.2 °F (36.8 °C)  Heart Rate:  [71-77] 75  Resp:  [16-18] 16  BP: (129-161)/() 161/116    Mental Status Exam:   Mental Status Exam:    Hygiene:   good  Cooperation:  Cooperative  Eye Contact:  Good  Psychomotor Behavior:  Slow  Affect:  Full range  Hopelessness: Optimistic  Speech:  Normal  Thought Progress:  Goal directed and Linear  Thought Content:  Normal and Mood congruent  Suicidal:  None  Homicidal:  None  Hallucinations:  None  Delusion:  None  Memory:  Intact  Orientation:  Person,  Place, Time and Situation  Reliability:  good  Insight:  Fair  Judgement:  Fair  Impulse Control:  Fair    Results Review:   I reviewed the patient's new clinical results.  Lab Results (last 24 hours)     ** No results found for the last 24 hours. **        Imaging Results (Last 24 Hours)     ** No results found for the last 24 hours. **            Assessment & Plan     Evaluation for medical decision making capacity  -At this point in time, patient does have capacity for medical decision making.  She is alert and oriented and able to answer questions appropriately and I do not have any concerns about her making her own treatment plan and decisions going forward.    I discussed the patients findings and my recommendations with patient    Andrew Chapman MD  11/03/22  12:49 EDT

## 2022-11-04 PROCEDURE — 97535 SELF CARE MNGMENT TRAINING: CPT

## 2022-11-04 PROCEDURE — 97530 THERAPEUTIC ACTIVITIES: CPT

## 2022-11-04 PROCEDURE — 97110 THERAPEUTIC EXERCISES: CPT

## 2022-11-04 RX ADMIN — FUROSEMIDE 40 MG: 40 TABLET ORAL at 08:29

## 2022-11-04 RX ADMIN — TAMSULOSIN HYDROCHLORIDE 0.4 MG: 0.4 CAPSULE ORAL at 08:29

## 2022-11-04 RX ADMIN — FUROSEMIDE 40 MG: 40 TABLET ORAL at 17:07

## 2022-11-04 RX ADMIN — Medication 10 ML: at 21:16

## 2022-11-04 RX ADMIN — PANTOPRAZOLE SODIUM 40 MG: 40 TABLET, DELAYED RELEASE ORAL at 05:49

## 2022-11-04 RX ADMIN — APIXABAN 5 MG: 5 TABLET, FILM COATED ORAL at 21:14

## 2022-11-04 RX ADMIN — METOPROLOL TARTRATE 12.5 MG: 25 TABLET, FILM COATED ORAL at 08:29

## 2022-11-04 RX ADMIN — GABAPENTIN 300 MG: 300 CAPSULE ORAL at 21:14

## 2022-11-04 RX ADMIN — HYDRALAZINE HYDROCHLORIDE 5 MG: 25 TABLET, FILM COATED ORAL at 21:14

## 2022-11-04 RX ADMIN — APIXABAN 5 MG: 5 TABLET, FILM COATED ORAL at 08:29

## 2022-11-04 RX ADMIN — Medication 10 MG: at 21:14

## 2022-11-04 RX ADMIN — HYDRALAZINE HYDROCHLORIDE 5 MG: 25 TABLET, FILM COATED ORAL at 05:49

## 2022-11-04 RX ADMIN — HYDRALAZINE HYDROCHLORIDE 5 MG: 25 TABLET, FILM COATED ORAL at 13:44

## 2022-11-04 RX ADMIN — METOPROLOL TARTRATE 12.5 MG: 25 TABLET, FILM COATED ORAL at 21:14

## 2022-11-04 RX ADMIN — Medication 10 ML: at 08:32

## 2022-11-04 RX ADMIN — Medication 10 ML: at 08:31

## 2022-11-04 RX ADMIN — Medication 1 TABLET: at 08:29

## 2022-11-04 RX ADMIN — ESCITALOPRAM 10 MG: 10 TABLET, FILM COATED ORAL at 08:29

## 2022-11-04 NOTE — PROGRESS NOTES
Pt doing well. Was seen on rounds having a lunch time nap. No acute complaints.     Psych agrees pt is decisional.    Swing bed pt, will continue to follow

## 2022-11-04 NOTE — PROGRESS NOTES
Pt seen on rounds this AM. No acute complaints. No issues.    CM notes reviewed, still pending some insurance review.

## 2022-11-04 NOTE — PLAN OF CARE
Goal Outcome Evaluation:  Pt resting in bed. Pt worked with PT this shift. No complaints at this time. Will continue plan of care.

## 2022-11-04 NOTE — CASE MANAGEMENT/SOCIAL WORK
Discharge Planning Assessment   Scot     Patient Name: Tre Kinney  MRN: [id number]  Today's Date: 11/4/2022    Admit Date: 10/5/2022    Plan: SS spoke with pt at bedside on this date 11/4/22. Pt was pleasent when speaking with her today. Pt states she has not received an update from insurance on this date. SS to follow.   Discharge Plan     Row Name 11/04/22 1646       Plan    Plan SS spoke with pt at bedside on this date 11/4/22. Pt was pleasent when speaking with her today. Pt states she has not received an update from insurance on this date. SS to follow.                  XANDER Craig

## 2022-11-04 NOTE — PLAN OF CARE
Goal Outcome Evaluation:  Plan of Care Reviewed With: patient        Progress: no change  Outcome Evaluation: Pt has rested well in bed overnight. VSS. No acute changes overnight. Will continue to follow plan of care.

## 2022-11-04 NOTE — THERAPY TREATMENT NOTE
Acute Care - Occupational Therapy Treatment Note   Scot     Patient Name: Tre Kinney  : 1932  MRN: [id number]  Today's Date: 2022  Onset of Illness/Injury or Date of Surgery: 10/06/22     Referring Physician: Janneth    Admit Date: 10/5/2022     No diagnosis found.  Patient Active Problem List   Diagnosis   • Acid reflux   • Ankle arthralgia   • Cardiac conduction disorder   • Closed fracture of distal fibula   • Arteriosclerosis of coronary artery   • Fracture of distal end of tibia   • Elevated cholesterol   • BP (high blood pressure)   • Hypertrophic polyarthritis   • OP (osteoporosis)   • Right heart failure (HCC)   • Heart failure, chronic, right-sided (HCC)   • SOB (shortness of breath)   • Palpitations   • CHF (congestive heart failure) (HCC)   • Congestive heart failure (HCC)   • Precordial pain   • Chronic anticoagulation   • Hypokalemia   • Anemia, unspecified type   • Generalized weakness     Past Medical History:   Diagnosis Date   • Anemia    • Arthritis    • CHF (congestive heart failure) ([facility])    • Chronic kidney disease    • Coronary artery disease    • H/O blood clots    • History of transfusion     no reaction    • Hypertension      Past Surgical History:   Procedure Laterality Date   • ABDOMINAL SURGERY     • APPENDECTOMY     • CARDIAC SURGERY     •  SECTION     • COLONOSCOPY N/A 3/7/2018    Procedure: COLONOSCOPY;  Surgeon: Willie Gresham MD;  Location:  [facility] ENDOSCOPY;  Service:    • CORONARY ANGIOPLASTY WITH STENT PLACEMENT     • ENDOSCOPY N/A 3/6/2018    Procedure: ESOPHAGOGASTRODUODENOSCOPY;  Surgeon: Willie Gresham MD;  Location:  [facility] ENDOSCOPY;  Service:    • PACEMAKER IMPLANTATION      left side    • SKIN BIOPSY     • TUBAL ABDOMINAL LIGATION           OT ASSESSMENT FLOWSHEET (last 12 hours)     OT Evaluation and Treatment     Row Name 22 1345                   OT Time and Intention    Document Type therapy note (daily note)  -KR        Mode of  Treatment occupational therapy  -KR        Patient Effort good  -KR        Symptoms Noted During/After Treatment fatigue  -KR        Comment Pt presented in chair at bedside. Communication began via Facetime with sonWillian, for beginning of session. Facetime later discontinued as sonLeno, joined session for in person visit with mother. Pt cooperative and motivated to perform ther ex as recommended.  -KR           Stand-Sit Transfer    Stand-Sit Eclectic (Transfers) minimum assist (75% patient effort);1 person assist  -KR        Comment, (Stand-Sit Transfer) Fxl transfer training performed with pt from seated in chair. Fxl positioning demonstrated and explained with pt and family, to maximize performance, using BUE,BLE, posture to achieve standing. Pt performed sit-3/4 stand x 3 from edge of chair. Objective: repetition of proper body mechanics sit/stand. Pt reported comprehension and was able to demonstrate adequately  -KR           Shoulder (Therapeutic Exercise)    Shoulder (Therapeutic Exercise) AROM (active range of motion);PROM (passive range of motion);strengthening exercise  -KR        Shoulder AROM (Therapeutic Exercise) bilateral;flexion;extension;aBduction;aDduction;sitting;10 repetitions;2 sets  -KR        Shoulder PROM (Therapeutic Exercise) bilateral;flexion;extension;aBduction;aDduction;sitting  -KR        Shoulder Strengthening (Therapeutic Exercise) bilateral;horizontal aBduction/aDduction;resistance band;yellow;10 repetitions;2 sets  -KR           Elbow/Forearm (Therapeutic Exercise)    Elbow/Forearm (Therapeutic Exercise) AROM (active range of motion);strengthening exercise  -KR        Elbow/Forearm AROM (Therapeutic Exercise) bilateral;flexion;extension;sitting  -KR        Elbow/Forearm Strengthening (Therapeutic Exercise) bilateral;flexion;extension;sitting;resistance band;yellow  -KR           Hand (Therapeutic Exercise)    Hand (Therapeutic Exercise) strengthening exercise  -KR         Hand Strengthening (Therapeutic Exercise) bilateral; strengthening;resistance band;yellow  -KR           Plan of Care Review    Progress improving  -KR           Positioning and Restraints    Pre-Treatment Position sitting in chair/recliner  -KR        Post Treatment Position chair  -KR        In Chair reclined;sitting;with nsg;with family/caregiver  -KR              User Key  (r) = Recorded By, (t) = Taken By, (c) = Cosigned By    Initials Name Effective Dates    KR Chapito Andres HEIDI, OT 06/16/21 -                        OT Recommendation and Plan     Plan of Care Review  Plan of Care Reviewed With: patient, son  Progress: improving  Plan of Care Reviewed With: patient, son     Outcome Measures     Row Name 11/04/22 1345 11/03/22 1310 11/02/22 1345       How much help from another person do you currently need...    Turning from your back to your side while in flat bed without using bedrails? -- 2  -KH --    Moving from lying on back to sitting on the side of a flat bed without bedrails? -- 2  -KH --    Moving to and from a bed to a chair (including a wheelchair)? -- 2  -KH --    Standing up from a chair using your arms (e.g., wheelchair, bedside chair)? -- 2  -KH --    Climbing 3-5 steps with a railing? -- 2  -KH --    To walk in hospital room? -- 2  -KH --    AM-PAC 6 Clicks Score (PT) -- 12  -KH --       How much help from another is currently needed...    Putting on and taking off regular lower body clothing? 2  -KR -- 2  -KR    Bathing (including washing, rinsing, and drying) 2  -KR -- 2  -KR    Toileting (which includes using toilet bed pan or urinal) 2  -KR -- 2  -KR    Putting on and taking off regular upper body clothing 3  -KR -- 3  -KR    Taking care of personal grooming (such as brushing teeth) 3  -KR -- 3  -KR    Eating meals 4  -KR -- 4  -KR    AM-PAC 6 Clicks Score (OT) 16  -KR -- 16  -KR       Functional Assessment    Outcome Measure Options -- AM-PAC 6 Clicks Basic Mobility (PT)  -KH --           User Key  (r) = Recorded By, (t) = Taken By, (c) = Cosigned By    Initials Name Provider Type    Andres Ayala OT Occupational Therapist    Radha Yusuf, EDEL Physical Therapist                Time Calculation:    Time Calculation- OT     Row Name 11/04/22 1427             Time Calculation- OT    Total Timed Code Minutes- OT 45 minute(s)  -KR            User Key  (r) = Recorded By, (t) = Taken By, (c) = Cosigned By    Initials Name Provider Type    Andres Ayala OT Occupational Therapist              Therapy Charges for Today     Code Description Service Date Service Provider Modifiers Qty    59911226352 HC OT THERAPEUTIC ACT EA 15 MIN 11/3/2022 Andres Uriarte OT GO 1    43273005370 HC OT THERAPEUTIC ACT EA 15 MIN 11/4/2022 Andres Uriarte OT GO 2    74422986326 HC OT SELF CARE/MGMT/TRAIN EA 15 MIN 11/4/2022 Andres Uriarte OT GO 1               Andres Uriarte OT  11/4/2022

## 2022-11-04 NOTE — THERAPY TREATMENT NOTE
Acute Care - Physical Therapy Treatment Note   [facility]     Patient Name: Tre Kinney  : 1932  MRN: [id number]  Today's Date: 2022   Onset of Illness/Injury or Date of Surgery: 10/06/22  Visit Dx:   No diagnosis found.  Patient Active Problem List   Diagnosis   • Acid reflux   • Ankle arthralgia   • Cardiac conduction disorder   • Closed fracture of distal fibula   • Arteriosclerosis of coronary artery   • Fracture of distal end of tibia   • Elevated cholesterol   • BP (high blood pressure)   • Hypertrophic polyarthritis   • OP (osteoporosis)   • Right heart failure (HCC)   • Heart failure, chronic, right-sided (HCC)   • SOB (shortness of breath)   • Palpitations   • CHF (congestive heart failure) (HCC)   • Congestive heart failure (HCC)   • Precordial pain   • Chronic anticoagulation   • Hypokalemia   • Anemia, unspecified type   • Generalized weakness     Past Medical History:   Diagnosis Date   • Anemia    • Arthritis    • CHF (congestive heart failure) (HCC)    • Chronic kidney disease    • Coronary artery disease    • H/O blood clots    • History of transfusion     no reaction    • Hypertension      Past Surgical History:   Procedure Laterality Date   • ABDOMINAL SURGERY     • APPENDECTOMY     • CARDIAC SURGERY     •  SECTION     • COLONOSCOPY N/A 3/7/2018    Procedure: COLONOSCOPY;  Surgeon: Willie Gresham MD;  Location:  [facility] ENDOSCOPY;  Service:    • CORONARY ANGIOPLASTY WITH STENT PLACEMENT     • ENDOSCOPY N/A 3/6/2018    Procedure: ESOPHAGOGASTRODUODENOSCOPY;  Surgeon: Willie Gresham MD;  Location:  [facility] ENDOSCOPY;  Service:    • PACEMAKER IMPLANTATION      left side    • SKIN BIOPSY     • TUBAL ABDOMINAL LIGATION       PT Assessment (last 12 hours)     PT Evaluation and Treatment     Row Name 22 1000          Physical Therapy Time and Intention    Subjective Information complains of;fatigue  -KM     Document Type therapy note (daily note)  -KM     Mode of Treatment  individual therapy;physical therapy  -KM     Patient Effort adequate  -KM     Symptoms Noted During/After Treatment fatigue  -     Row Name 11/04/22 1000          General Information    Patient Profile Reviewed yes  -KM     Patient Observations alert;cooperative;agree to therapy  -     Existing Precautions/Restrictions fall;other (see comments);TLSO  -     Row Name 11/04/22 1000          Cognition    Affect/Mental Status (Cognition) [facility]  -     Orientation Status (Cognition) oriented x 4  -KM     Follows Commands (Cognition) WFL  -[facility] Name 11/04/22 1000          Bed Mobility    Comment, (Bed Mobility) Pt. refused bed mobility d/t just getting in bed after sitting in chair for prolonged period of time  -[facility] Name 11/04/22 1000          Transfers    Comment, (Transfers) Pt. refused transfers d/t not wanting to get out of bed after just laying down after long period of sitting up in chair  -[facility] Name 11/04/22 1000          Gait/Stairs (Locomotion)    Comment, (Gait/Stairs) Pt. just got in bed and refused all mobility activities d/t fatigue from sitting up in chair for prolonged period of time.  -     Row Name 11/04/22 1000          Safety Issues, Functional Mobility    Impairments Affecting Function (Mobility) balance;strength;endurance/activity tolerance  -[facility] Name 11/04/22 1000          Motor Skills    Therapeutic Exercise hip;knee;ankle  -[facility] Name 11/04/22 1000          Hip (Therapeutic Exercise)    Hip (Therapeutic Exercise) strengthening exercise  -     Hip Strengthening (Therapeutic Exercise) bilateral;flexion;aBduction;aDduction;heel slides;supine;10 repetitions;3 sets  hamstring sets w/ pressing heel into bed  -[facility] Name 11/04/22 1000          Knee (Therapeutic Exercise)    Knee (Therapeutic Exercise) strengthening exercise  -     Knee Strengthening (Therapeutic Exercise) bilateral;flexion;extension;heel slides;SLR (straight leg raise);supine;10 repetitions;3  "sets  -KM     Row Name 11/04/22 1000          Ankle (Therapeutic Exercise)    Ankle (Therapeutic Exercise) strengthening exercise  -KM     Ankle Strengthening (Therapeutic Exercise) bilateral;dorsiflexion;plantarflexion;10 repetitions;3 sets  -KM     Row Name 11/04/22 1000          Progress Summary (PT)    Progress Toward Functional Goals (PT) progress toward functional goals is fair  -KM     Daily Progress Summary (PT) Pt. refused mobility skills during PT session d/t fatigue from sitting up for prolonged period of time during the morning. Pt. refused w/ both PT and OT in room at the time. When asked if she wanted to facetime her son for PT, she stated \"not this morning\". When clarifying that she did not want to facetime her children for PT she clearly stated \"no\" once again w/ both PT and RNNhi in room. She tolerated supine ther-ex well and was a willing participant. She performed 3 sets x 10 reps of multiple lower extremity exercises w/ rest breaks in between. Pt. was alert and oriented throughout entire PT session.  -           User Key  (r) = Recorded By, (t) = Taken By, (c) = Cosigned By    Initials Name Provider Type    Mao Batista, PT Physical Therapist                Physical Therapy Education     Title: PT OT SLP Therapies (Done)     Topic: Physical Therapy (Done)     Point: Mobility training (Done)     Learning Progress Summary           Patient Acceptance, E,TB, VU by KM at 11/4/2022 1101                   Point: Home exercise program (Done)     Learning Progress Summary           Patient Acceptance, E,TB, VU by KM at 11/4/2022 1101                   Point: Body mechanics (Done)     Learning Progress Summary           Patient Acceptance, E,TB, VU by KM at 11/4/2022 1101                   Point: Precautions (Done)     Learning Progress Summary           Patient Acceptance, E,TB, VU by REMEDIOS at 11/4/2022 1101                               User Key     Initials Effective Dates Name Provider Type " "Discipline    KM 05/24/22 -  Mao Vázquez, PT Physical Therapist PT              PT Recommendation and Plan     Progress Summary (PT)  Progress Toward Functional Goals (PT): progress toward functional goals is fair  Daily Progress Summary (PT): Pt. refused mobility skills during PT session d/t fatigue from sitting up for prolonged period of time during the morning. Pt. refused w/ both PT and OT in room at the time. When asked if she wanted to facetime her son for PT, she stated \"not this morning\". When clarifying that she did not want to facetime her children for PT she clearly stated \"no\" once again w/ both PT and RNNhi in room. She tolerated supine ther-ex well and was a willing participant. She performed 3 sets x 10 reps of multiple lower extremity exercises w/ rest breaks in between. Pt. was alert and oriented throughout entire PT session.   Outcome Measures     Row Name 11/03/22 1310 11/02/22 1345          How much help from another person do you currently need...    Turning from your back to your side while in flat bed without using bedrails? 2  -KH --     Moving from lying on back to sitting on the side of a flat bed without bedrails? 2  -KH --     Moving to and from a bed to a chair (including a wheelchair)? 2  -KH --     Standing up from a chair using your arms (e.g., wheelchair, bedside chair)? 2  -KH --     Climbing 3-5 steps with a railing? 2  -KH --     To walk in hospital room? 2  -KH --     AM-PAC 6 Clicks Score (PT) 12  -KH --        How much help from another is currently needed...    Putting on and taking off regular lower body clothing? -- 2  -KR     Bathing (including washing, rinsing, and drying) -- 2  -KR     Toileting (which includes using toilet bed pan or urinal) -- 2  -KR     Putting on and taking off regular upper body clothing -- 3  -KR     Taking care of personal grooming (such as brushing teeth) -- 3  -KR     Eating meals -- 4  -KR     AM-PAC 6 Clicks Score (OT) -- 16  -KR        " Functional Assessment    Outcome Measure Options AM-PAC 6 Clicks Basic Mobility (PT)  - --           User Key  (r) = Recorded By, (t) = Taken By, (c) = Cosigned By    Initials Name Provider Type    Andres Ayala, OT Occupational Therapist    Radha Yusuf, PT Physical Therapist                 Time Calculation:    PT Charges     Row Name 11/04/22 1047             Time Calculation    Start Time 1020  -KM      Stop Time 1045  -KM      Time Calculation (min) 25 min  -KM      PT Received On 11/04/22  -KM         Time Calculation- PT    Total Timed Code Minutes- PT 25 minute(s)  -KM            User Key  (r) = Recorded By, (t) = Taken By, (c) = Cosigned By    Initials Name Provider Type    Mao Batista, PT Physical Therapist              Therapy Charges for Today     Code Description Service Date Service Provider Modifiers Qty    39144097672 HC PT THER PROC EA 15 MIN 11/4/2022 Mao Vázquez, PT GP 2          PT G-Codes  Outcome Measure Options: AM-PAC 6 Clicks Basic Mobility (PT)  AM-PAC 6 Clicks Score (PT): 13  AM-PAC 6 Clicks Score (OT): 16    Mao Vázquez PT  11/4/2022

## 2022-11-05 LAB
ALBUMIN SERPL-MCNC: 2.83 G/DL (ref 3.5–5.2)
ALBUMIN/GLOB SERPL: 0.9 G/DL
ALP SERPL-CCNC: 51 U/L (ref 39–117)
ALT SERPL W P-5'-P-CCNC: <5 U/L (ref 1–33)
ANION GAP SERPL CALCULATED.3IONS-SCNC: 13 MMOL/L (ref 5–15)
AST SERPL-CCNC: 11 U/L (ref 1–32)
B PARAPERT DNA SPEC QL NAA+PROBE: NOT DETECTED
B PERT DNA SPEC QL NAA+PROBE: NOT DETECTED
BASOPHILS # BLD AUTO: 0.06 10*3/MM3 (ref 0–0.2)
BASOPHILS NFR BLD AUTO: 0.8 % (ref 0–1.5)
BILIRUB SERPL-MCNC: 0.4 MG/DL (ref 0–1.2)
BUN SERPL-MCNC: 31 MG/DL (ref 8–23)
BUN/CREAT SERPL: 15.8 (ref 7–25)
C PNEUM DNA NPH QL NAA+NON-PROBE: NOT DETECTED
CALCIUM SPEC-SCNC: 8.8 MG/DL (ref 8.6–10.5)
CHLORIDE SERPL-SCNC: 99 MMOL/L (ref 98–107)
CO2 SERPL-SCNC: 26 MMOL/L (ref 22–29)
CREAT SERPL-MCNC: 1.96 MG/DL (ref 0.57–1)
DEPRECATED RDW RBC AUTO: 54.2 FL (ref 37–54)
EGFRCR SERPLBLD CKD-EPI 2021: 24.1 ML/MIN/1.73
EOSINOPHIL # BLD AUTO: 0.47 10*3/MM3 (ref 0–0.4)
EOSINOPHIL NFR BLD AUTO: 6.1 % (ref 0.3–6.2)
ERYTHROCYTE [DISTWIDTH] IN BLOOD BY AUTOMATED COUNT: 15.6 % (ref 12.3–15.4)
FLUAV SUBTYP SPEC NAA+PROBE: NOT DETECTED
FLUBV RNA ISLT QL NAA+PROBE: NOT DETECTED
GLOBULIN UR ELPH-MCNC: 3.1 GM/DL
GLUCOSE SERPL-MCNC: 104 MG/DL (ref 65–99)
HADV DNA SPEC NAA+PROBE: NOT DETECTED
HCOV 229E RNA SPEC QL NAA+PROBE: NOT DETECTED
HCOV HKU1 RNA SPEC QL NAA+PROBE: NOT DETECTED
HCOV NL63 RNA SPEC QL NAA+PROBE: NOT DETECTED
HCOV OC43 RNA SPEC QL NAA+PROBE: NOT DETECTED
HCT VFR BLD AUTO: 33.3 % (ref 34–46.6)
HGB BLD-MCNC: 10.7 G/DL (ref 12–15.9)
HMPV RNA NPH QL NAA+NON-PROBE: NOT DETECTED
HPIV1 RNA ISLT QL NAA+PROBE: NOT DETECTED
HPIV2 RNA SPEC QL NAA+PROBE: NOT DETECTED
HPIV3 RNA NPH QL NAA+PROBE: NOT DETECTED
HPIV4 P GENE NPH QL NAA+PROBE: NOT DETECTED
IMM GRANULOCYTES # BLD AUTO: 0.03 10*3/MM3 (ref 0–0.05)
IMM GRANULOCYTES NFR BLD AUTO: 0.4 % (ref 0–0.5)
LYMPHOCYTES # BLD AUTO: 1.01 10*3/MM3 (ref 0.7–3.1)
LYMPHOCYTES NFR BLD AUTO: 13.2 % (ref 19.6–45.3)
M PNEUMO IGG SER IA-ACNC: NOT DETECTED
MCH RBC QN AUTO: 30.6 PG (ref 26.6–33)
MCHC RBC AUTO-ENTMCNC: 32.1 G/DL (ref 31.5–35.7)
MCV RBC AUTO: 95.1 FL (ref 79–97)
MONOCYTES # BLD AUTO: 0.55 10*3/MM3 (ref 0.1–0.9)
MONOCYTES NFR BLD AUTO: 7.2 % (ref 5–12)
NEUTROPHILS NFR BLD AUTO: 5.54 10*3/MM3 (ref 1.7–7)
NEUTROPHILS NFR BLD AUTO: 72.3 % (ref 42.7–76)
NRBC BLD AUTO-RTO: 0 /100 WBC (ref 0–0.2)
PLATELET # BLD AUTO: 252 10*3/MM3 (ref 140–450)
PMV BLD AUTO: 10.1 FL (ref 6–12)
POTASSIUM SERPL-SCNC: 3.9 MMOL/L (ref 3.5–5.2)
PROCALCITONIN SERPL-MCNC: 0.09 NG/ML (ref 0–0.25)
PROT SERPL-MCNC: 5.9 G/DL (ref 6–8.5)
RBC # BLD AUTO: 3.5 10*6/MM3 (ref 3.77–5.28)
RHINOVIRUS RNA SPEC NAA+PROBE: NOT DETECTED
RSV RNA NPH QL NAA+NON-PROBE: NOT DETECTED
SARS-COV-2 RNA NPH QL NAA+NON-PROBE: NOT DETECTED
SODIUM SERPL-SCNC: 138 MMOL/L (ref 136–145)
WBC NRBC COR # BLD: 7.66 10*3/MM3 (ref 3.4–10.8)

## 2022-11-05 PROCEDURE — 97535 SELF CARE MNGMENT TRAINING: CPT

## 2022-11-05 PROCEDURE — 97110 THERAPEUTIC EXERCISES: CPT

## 2022-11-05 PROCEDURE — 80053 COMPREHEN METABOLIC PANEL: CPT | Performed by: INTERNAL MEDICINE

## 2022-11-05 PROCEDURE — 84145 PROCALCITONIN (PCT): CPT | Performed by: INTERNAL MEDICINE

## 2022-11-05 PROCEDURE — 0202U NFCT DS 22 TRGT SARS-COV-2: CPT | Performed by: INTERNAL MEDICINE

## 2022-11-05 PROCEDURE — 97530 THERAPEUTIC ACTIVITIES: CPT

## 2022-11-05 PROCEDURE — 85025 COMPLETE CBC W/AUTO DIFF WBC: CPT | Performed by: INTERNAL MEDICINE

## 2022-11-05 RX ORDER — GUAIFENESIN/DEXTROMETHORPHAN 100-10MG/5
5 SYRUP ORAL EVERY 4 HOURS PRN
Status: DISCONTINUED | OUTPATIENT
Start: 2022-11-05 | End: 2023-01-13 | Stop reason: HOSPADM

## 2022-11-05 RX ADMIN — METOPROLOL TARTRATE 12.5 MG: 25 TABLET, FILM COATED ORAL at 21:12

## 2022-11-05 RX ADMIN — Medication 1 TABLET: at 08:19

## 2022-11-05 RX ADMIN — GABAPENTIN 300 MG: 300 CAPSULE ORAL at 21:12

## 2022-11-05 RX ADMIN — FUROSEMIDE 40 MG: 40 TABLET ORAL at 18:12

## 2022-11-05 RX ADMIN — HYDRALAZINE HYDROCHLORIDE 5 MG: 25 TABLET, FILM COATED ORAL at 05:23

## 2022-11-05 RX ADMIN — Medication 10 MG: at 21:12

## 2022-11-05 RX ADMIN — Medication 10 ML: at 08:18

## 2022-11-05 RX ADMIN — ESCITALOPRAM 10 MG: 10 TABLET, FILM COATED ORAL at 08:18

## 2022-11-05 RX ADMIN — APIXABAN 5 MG: 5 TABLET, FILM COATED ORAL at 08:19

## 2022-11-05 RX ADMIN — METOPROLOL TARTRATE 12.5 MG: 25 TABLET, FILM COATED ORAL at 08:19

## 2022-11-05 RX ADMIN — FUROSEMIDE 40 MG: 40 TABLET ORAL at 08:19

## 2022-11-05 RX ADMIN — TAMSULOSIN HYDROCHLORIDE 0.4 MG: 0.4 CAPSULE ORAL at 08:19

## 2022-11-05 RX ADMIN — Medication 10 ML: at 21:13

## 2022-11-05 RX ADMIN — PANTOPRAZOLE SODIUM 40 MG: 40 TABLET, DELAYED RELEASE ORAL at 05:23

## 2022-11-05 RX ADMIN — HYDRALAZINE HYDROCHLORIDE 5 MG: 25 TABLET, FILM COATED ORAL at 21:12

## 2022-11-05 RX ADMIN — APIXABAN 5 MG: 5 TABLET, FILM COATED ORAL at 21:12

## 2022-11-05 RX ADMIN — HYDRALAZINE HYDROCHLORIDE 5 MG: 25 TABLET, FILM COATED ORAL at 14:14

## 2022-11-05 NOTE — PROGRESS NOTES
Pt sleeping again when seen on rounds. Initially said nothing was wrong, but when pushed and asked specifically did admit to feeling more tired than usual the last couple days, with a new mild non-productive cough, and nasal congestion, but denies other viral/infectious symptoms like malaise, aches and pains, sputum, dysuria.     Put in orders for labs and cxr.    CXR PA/Lat without opacities or acute findings, Labs without concerning acute findings.     Continue to monitor and offer any symptom support necessary  Continue PT/OT

## 2022-11-05 NOTE — PLAN OF CARE
Goal Outcome Evaluation:  Plan of Care Reviewed With: patient        Progress: no change  Outcome Evaluation: Pt resting well in bed at this time. No complaints or acute changes at this time. VSS. Will continue to monitor.

## 2022-11-05 NOTE — THERAPY TREATMENT NOTE
Acute Care - Physical Therapy Treatment Note   [facility]     Patient Name: Tre Kinney  : 1932  MRN: [id number]  Today's Date: 2022   Onset of Illness/Injury or Date of Surgery: 10/06/22  Visit Dx:   No diagnosis found.  Patient Active Problem List   Diagnosis   • Acid reflux   • Ankle arthralgia   • Cardiac conduction disorder   • Closed fracture of distal fibula   • Arteriosclerosis of coronary artery   • Fracture of distal end of tibia   • Elevated cholesterol   • BP (high blood pressure)   • Hypertrophic polyarthritis   • OP (osteoporosis)   • Right heart failure (HCC)   • Heart failure, chronic, right-sided (HCC)   • SOB (shortness of breath)   • Palpitations   • CHF (congestive heart failure) (HCC)   • Congestive heart failure (HCC)   • Precordial pain   • Chronic anticoagulation   • Hypokalemia   • Anemia, unspecified type   • Generalized weakness     Past Medical History:   Diagnosis Date   • Anemia    • Arthritis    • CHF (congestive heart failure) (HCC)    • Chronic kidney disease    • Coronary artery disease    • H/O blood clots    • History of transfusion     no reaction    • Hypertension      Past Surgical History:   Procedure Laterality Date   • ABDOMINAL SURGERY     • APPENDECTOMY     • CARDIAC SURGERY     •  SECTION     • COLONOSCOPY N/A 3/7/2018    Procedure: COLONOSCOPY;  Surgeon: Willie Gresham MD;  Location:  [facility];  Service:    • CORONARY ANGIOPLASTY WITH STENT PLACEMENT     • ENDOSCOPY N/A 3/6/2018    Procedure: ESOPHAGOGASTRODUODENOSCOPY;  Surgeon: Willie Gresham MD;  Location:  [facility];  Service:    • PACEMAKER IMPLANTATION      left side    • SKIN BIOPSY     • TUBAL ABDOMINAL LIGATION       PT Assessment (last 12 hours)     PT Evaluation and Treatment     Row Name 22 1215          Physical Therapy Time and Intention    Subjective Information no complaints  -LL     Document Type therapy note (daily note)  -LL     Mode of Treatment physical  therapy  -LL     Patient Effort adequate  -LL     Comment PT session performed 12:15-12:40 this date with assitance from VELASQUEZ Bustamante.  Attempted to facetime family per request, but there was no answer.  Patient was compliant with activities this date and required frequent rest breaks.  Upon arrival, patient was in bed and required mod A for bed mobility, mod/max A for sit <> stand and mod/max A x 2 for 5-6 steps with use of RW for bed > chair.  Patient performed sitting ex's with LE's with rest breaks.  Following session, patient left sitting upright in recliner with tray table in front of her and nursing staff was notified.  -LL     Row Name 11/05/22 1215          General Information    Patient Profile Reviewed yes  -LL     Existing Precautions/Restrictions fall;other (see comments);TLSO  swan; pt declines use of TLSO  -LL     Row Name 11/05/22 1215          Cognition    Affect/Mental Status (Cognition) WFL  -LL     Orientation Status (Cognition) oriented x 4  -LL     Follows Commands (Cognition) WFL  -LL     Personal Safety Interventions fall prevention program maintained;gait belt;nonskid shoes/slippers when out of bed  -LL     Row Name 11/05/22 1215          Bed Mobility    Rolling Left [place] (Bed Mobility) --  -LL     Rolling Right [place] (Bed Mobility) --  -LL     Scooting/Bridging [place] (Bed Mobility) --  -LL     Supine-Sit [place] (Bed Mobility) 1 person assist;verbal cues;nonverbal cues (demo/gesture);moderate assist (50% patient effort)  -LL     Bed Mobility, Safety Issues decreased use of legs for bridging/pushing;decreased use of arms for pushing/pulling;impaired trunk control for bed mobility  log roll  -LL     Assistive Device (Bed Mobility) bed rails;draw sheet  -     Row Name 11/05/22 1215          Chair-Bed Transfer    Chair-Bed [place] (Transfers) moderate assist (50% patient effort);2 person assist;verbal cues;nonverbal cues (demo/gesture);maximum assist (25%  patient effort)  -LL     Assistive Device (Chair-Bed Transfers) walker, front-wheeled  -LL     Row Name 11/05/22 1215          Sit-Stand Transfer    Sit-Stand [place] (Transfers) moderate assist (50% patient effort);verbal cues;nonverbal cues (demo/gesture);maximum assist (25% patient effort)  -LL     Assistive Device (Sit-Stand Transfers) walker, front-wheeled  -LL     Row Name 11/05/22 1215          Stand-Sit Transfer    Stand-Sit [place] (Transfers) moderate assist (50% patient effort);verbal cues;nonverbal cues (demo/gesture);maximum assist (25% patient effort)  -LL     Assistive Device (Stand-Sit Transfers) walker, front-wheeled  -LL     Row Name 11/05/22 1215          Gait/Stairs (Locomotion)    [place] Level (Gait) moderate assist (50% patient effort);2 person assist;verbal cues;nonverbal cues (demo/gesture);maximum assist (25% patient effort)  constant verbal cues for upright posture  -LL     Assistive Device (Gait) walker, front-wheeled  -LL     Distance in Feet (Gait) 5-6 steps  -LL     Pattern (Gait) step-to  -LL     Deviations/Abnormal Patterns (Gait) gait speed decreased;festinating/shuffling;weight shifting decreased  -LL     Bilateral Gait Deviations weight shift ability decreased;forward flexed posture;knee buckling bilaterally  -     Row Name 11/05/22 1215          Motor Skills    Therapeutic Exercise --  LAQ, marching, GS, hip abd/add, AP; RROM w/ red theraband including  hamstring curls and hip abduction x 10 reps each with rest breaks inbetween.  -     Row Name 11/05/22 1215          Coping    Observed Emotional State calm;cooperative  -     Verbalized Emotional State acceptance  -     Row Name 11/05/22 1215          Positioning and Restraints    Pre-Treatment Position in bed  -LL     In Bed sitting;notified nsg;call light within reach;encouraged to call for assist  With tray table in front of her  -LL           User Key  (r) = Recorded By, (t) = Taken By, (c) = Cosigned By     Initials Name Provider Type    LL Neda Garcia PTA Physical Therapist Assistant                Physical Therapy Education     Title: PT OT SLP Therapies (Done)     Topic: Physical Therapy (Done)     Point: Mobility training (Done)     Learning Progress Summary           Patient Acceptance, E,D, VU,NR by LL at 11/5/2022 1325    Acceptance, E, VU by EB at 11/4/2022 1448    Acceptance, E,TB, VU by KM at 11/4/2022 1101                   Point: Home exercise program (Done)     Learning Progress Summary           Patient Acceptance, E,D, VU,NR by LL at 11/5/2022 1325    Acceptance, E, VU by EB at 11/4/2022 1448    Acceptance, E,TB, VU by KM at 11/4/2022 1101                   Point: Body mechanics (Done)     Learning Progress Summary           Patient Acceptance, E,D, VU,NR by LL at 11/5/2022 1325    Acceptance, E, VU by EB at 11/4/2022 1448    Acceptance, E,TB, VU by KM at 11/4/2022 1101                   Point: Precautions (Done)     Learning Progress Summary           Patient Acceptance, E,D, VU,NR by LL at 11/5/2022 1325    Acceptance, E, VU by EB at 11/4/2022 1448    Acceptance, E,TB, VU by KM at 11/4/2022 1101                               User Key     Initials Effective Dates Name Provider Type Discipline     05/02/16 -  Neda Garcia PTA Physical Therapist Assistant PT    KM 05/24/22 -  Mao Vázquez PT Physical Therapist PT    EB 09/22/22 -  Cedric Oliver RN Registered Nurse Nurse              PT Recommendation and Plan         Outcome Measures     Row Name 11/05/22 1300 11/04/22 1345 11/03/22 1310       How much help from another person do you currently need...    Turning from your back to your side while in flat bed without using bedrails? 3  -LL -- 2  -KH    Moving from lying on back to sitting on the side of a flat bed without bedrails? 2  -LL -- 2  -KH    Moving to and from a bed to a chair (including a wheelchair)? 2  -LL -- 2  -KH    Standing up from a chair using your arms (e.g.,  wheelchair, bedside chair)? 2  -LL -- 2  -KH    Climbing 3-5 steps with a railing? 2  -LL -- 2  -KH    To walk in hospital room? 2  -LL -- 2  -KH    AM-PAC 6 Clicks Score (PT) 13  -LL -- 12  -KH       How much help from another is currently needed...    Putting on and taking off regular lower body clothing? 2  -LA 2  -KR --    Bathing (including washing, rinsing, and drying) 2  -LA 2  -KR --    Toileting (which includes using toilet bed pan or urinal) 2  -LA 2  -KR --    Putting on and taking off regular upper body clothing 3  -LA 3  -KR --    Taking care of personal grooming (such as brushing teeth) 3  -LA 3  -KR --    Eating meals 4  -LA 4  -KR --    AM-PAC 6 Clicks Score (OT) 16  -LA 16  -KR --       Functional Assessment    Outcome Measure Options AM-PAC 6 Clicks Daily Activity (OT)  -LA -- AM-PAC 6 Clicks Basic Mobility (PT)  -    Row Name 11/02/22 1345             How much help from another is currently needed...    Putting on and taking off regular lower body clothing? 2  -KR      Bathing (including washing, rinsing, and drying) 2  -KR      Toileting (which includes using toilet bed pan or urinal) 2  -KR      Putting on and taking off regular upper body clothing 3  -KR      Taking care of personal grooming (such as brushing teeth) 3  -KR      Eating meals 4  -KR      AM-PAC 6 Clicks Score (OT) 16  -KR            User Key  (r) = Recorded By, (t) = Taken By, (c) = Cosigned By    Initials Name Provider Type    Andres Ayala, OT Occupational Therapist    LL Neda Garcia, PTA Physical Therapist Assistant    Radha Yusuf, PT Physical Therapist    Dianna Barreto, OT Occupational Therapist                 Time Calculation:    PT Charges     Row Name 11/05/22 1326             Time Calculation    Start Time 1215  -LL      Stop Time 1240  -LL      Time Calculation (min) 25 min  -LL      PT Received On 11/05/22  -LL         Time Calculation- PT    Total Timed Code Minutes- PT 25 minute(s)  -LL             User Key  (r) = Recorded By, (t) = Taken By, (c) = Cosigned By    Initials Name Provider Type     Neda Garcia PTA Physical Therapist Assistant              Therapy Charges for Today     Code Description Service Date Service Provider Modifiers Qty    73651023071 HC PT THERAPEUTIC ACT EA 15 MIN 11/5/2022 Neda Garcia PTA GP, CQ 1    46413271390 HC PT THER PROC EA 15 MIN 11/5/2022 Neda Garcia PTA GP, CQ 1          PT G-Codes  Outcome Measure Options: AM-PAC 6 Clicks Daily Activity (OT)  AM-PAC 6 Clicks Score (PT): 13  AM-PAC 6 Clicks Score (OT): 16    Hannah Garcia PTA  11/5/2022

## 2022-11-05 NOTE — THERAPY TREATMENT NOTE
Acute Care - Occupational Therapy Treatment Note   [facility]     Patient Name: Tre Kinney  : 1932  MRN: [id number]  Today's Date: 2022  Onset of Illness/Injury or Date of Surgery: 10/06/22     Referring Physician: Janneth    Admit Date: 10/5/2022     No diagnosis found.  Patient Active Problem List   Diagnosis   • Acid reflux   • Ankle arthralgia   • Cardiac conduction disorder   • Closed fracture of distal fibula   • Arteriosclerosis of coronary artery   • Fracture of distal end of tibia   • Elevated cholesterol   • BP (high blood pressure)   • Hypertrophic polyarthritis   • OP (osteoporosis)   • Right heart failure (HCC)   • Heart failure, chronic, right-sided (HCC)   • SOB (shortness of breath)   • Palpitations   • CHF (congestive heart failure) (HCC)   • Congestive heart failure (HCC)   • Precordial pain   • Chronic anticoagulation   • Hypokalemia   • Anemia, unspecified type   • Generalized weakness     Past Medical History:   Diagnosis Date   • Anemia    • Arthritis    • CHF (congestive heart failure) ([facility])    • Chronic kidney disease    • Coronary artery disease    • H/O blood clots    • History of transfusion     no reaction    • Hypertension      Past Surgical History:   Procedure Laterality Date   • ABDOMINAL SURGERY     • APPENDECTOMY     • CARDIAC SURGERY     •  SECTION     • COLONOSCOPY N/A 3/7/2018    Procedure: COLONOSCOPY;  Surgeon: Willie Gresham MD;  Location:  [facility];  Service:    • CORONARY ANGIOPLASTY WITH STENT PLACEMENT     • ENDOSCOPY N/A 3/6/2018    Procedure: ESOPHAGOGASTRODUODENOSCOPY;  Surgeon: Willie Gresham MD;  Location:  [facility];  Service:    • PACEMAKER IMPLANTATION      left side    • SKIN BIOPSY     • TUBAL ABDOMINAL LIGATION           OT ASSESSMENT FLOWSHEET (last 12 hours)     OT Evaluation and Treatment     Row Name 22 1259                   OT Time and Intention    Subjective Information no complaints  -LA        Mode of Treatment  occupational therapy  -LA        Patient Effort good  -LA        Symptoms Noted During/After Treatment none  -LA           General Information    Patient Profile Reviewed yes  -LA        Patient/Family/Caregiver Comments/Observations Patient agreeable to treatment this date. Patient on phone with son, Valdez whom wanted us to Facetime other brother Willian. Attempted FaceTime with Willian after speaking with nurse for IPAD. Unable to reach Willian via Facetime. Patient continued to be agreeable with therapy and pleasant and cooperative throughout session. OT/PT present 11:50- 12:40.  -LA        Existing Precautions/Restrictions fall  -LA           Cognition    Affect/Mental Status (Cognition) WFL  -LA        Orientation Status (Cognition) oriented x 4  -LA        Follows Commands (Cognition) WFL  -LA           Self-Feeding Assessment/Training    [place] Level (Feeding) feeding skills;set up  -LA        Position (Self-Feeding) supported sitting  Patient moved from bed to chair for meal this date.  -LA           Bed Mobility    Supine-Sit [place] (Bed Mobility) moderate assist (50% patient effort);maximum assist (25% patient effort);1 person assist  -LA           Bed-Chair Transfer    Bed-Chair [place] (Transfers) moderate assist (50% patient effort);maximum assist (25% patient effort);2 person assist  -LA        Comment, (Bed-Chair Transfer) cues for posture and safety. Patient continues to attempt to sit before its safe. Cues to back to chair until legs touch on the back of both legs then reach back and control sit.  -LA           Sit-Stand Transfer    Sit-Stand [place] (Transfers) moderate assist (50% patient effort);maximum assist (25% patient effort)  -LA           Motor Skills    Motor Skills coordination;functional endurance  -LA        Therapeutic Exercise shoulder;elbow/forearm;wrist;hand  -LA        Additional Documentation --  AROM and UE exercises with resistance band (red) completed in all  planes. patient with noted improvement in strength.  -LA           Balance    Static Sitting Balance verbal cues  Patient continues to lean to right side however able to correct with verbal cues.  -LA           Plan of Care Review    Plan of Care Reviewed With patient  -LA           Positioning and Restraints    Pre-Treatment Position in bed  -LA        Post Treatment Position chair  -LA        In Chair call light within reach;encouraged to call for assist;exit alarm on  Patient agreeable to stay up in chair at end of session this date. Patient did report she was up earlier this date as well. Patient reported sons Valdez and Leno present earlier this date for visit.  -LA              User Key  (r) = Recorded By, (t) = Taken By, (c) = Cosigned By    Initials Name Effective Dates    Dianna Barreto, OT 02/14/22 -                        OT Recommendation and Plan  Planned Therapy Interventions (OT): activity tolerance training, patient/caregiver education/training, adaptive equipment training, ROM/therapeutic exercise, occupation/activity based interventions, BADL retraining, strengthening exercise, transfer/mobility retraining, functional balance retraining  Therapy Frequency (OT): 6 times/wk  Plan of Care Review  Plan of Care Reviewed With: patient  Progress: improving  Outcome Evaluation: OT evaluation completed this date. Patient engaged in UE tasks with focus on trunk control and fx reaching to promote increased independence with ADLs. Patient has had a significant decline in independence and safety with ADLs resulting in need for skilled OT services. OT to address deficits to allow patient to max level of independence prior to d/c. Patient admitted for swing bed and OT discussed activities. Patient given word search puzzles to promote increase activity. patient also reports she watches television. Family also stated they could bring in books for entertainment as needed.  Plan of Care Reviewed With:  patient  Outcome Evaluation: OT evaluation completed this date. Patient engaged in UE tasks with focus on trunk control and fx reaching to promote increased independence with ADLs. Patient has had a significant decline in independence and safety with ADLs resulting in need for skilled OT services. OT to address deficits to allow patient to max level of independence prior to d/c. Patient admitted for swing bed and OT discussed activities. Patient given word search puzzles to promote increase activity. patient also reports she watches television. Family also stated they could bring in books for entertainment as needed.     Outcome Measures     Row Name 11/05/22 1300 11/04/22 1345 11/03/22 1310       How much help from another person do you currently need...    Turning from your back to your side while in flat bed without using bedrails? -- -- 2  -KH    Moving from lying on back to sitting on the side of a flat bed without bedrails? -- -- 2  -KH    Moving to and from a bed to a chair (including a wheelchair)? -- -- 2  -KH    Standing up from a chair using your arms (e.g., wheelchair, bedside chair)? -- -- 2  -KH    Climbing 3-5 steps with a railing? -- -- 2  -KH    To walk in hospital room? -- -- 2  -KH    AM-PAC 6 Clicks Score (PT) -- -- 12  -KH       How much help from another is currently needed...    Putting on and taking off regular lower body clothing? 2  -LA 2  -KR --    Bathing (including washing, rinsing, and drying) 2  -LA 2  -KR --    Toileting (which includes using toilet bed pan or urinal) 2  -LA 2  -KR --    Putting on and taking off regular upper body clothing 3  -LA 3  -KR --    Taking care of personal grooming (such as brushing teeth) 3  -LA 3  -KR --    Eating meals 4  -LA 4  -KR --    AM-PAC 6 Clicks Score (OT) 16  -LA 16  -KR --       Functional Assessment    Outcome Measure Options AM-PAC 6 Clicks Daily Activity (OT)  -LA -- AM-PAC 6 Clicks Basic Mobility (PT)  -KH    Row Name 11/02/22 0140              How much help from another is currently needed...    Putting on and taking off regular lower body clothing? 2  -KR      Bathing (including washing, rinsing, and drying) 2  -KR      Toileting (which includes using toilet bed pan or urinal) 2  -KR      Putting on and taking off regular upper body clothing 3  -KR      Taking care of personal grooming (such as brushing teeth) 3  -KR      Eating meals 4  -KR      AM-PAC 6 Clicks Score (OT) 16  -KR            User Key  (r) = Recorded By, (t) = Taken By, (c) = Cosigned By    Initials Name Provider Type    Andres Ayala, OT Occupational Therapist    Radha Yusuf, PT Physical Therapist    Dianna Barreto OT Occupational Therapist                Time Calculation:    Time Calculation- OT     Row Name 11/05/22 1310             Time Calculation- OT    OT Start Time 1150  -LA      OT Stop Time 1215  -LA      OT Time Calculation (min) 25 min  -LA      Total Timed Code Minutes- OT 25 minute(s)  -LA            User Key  (r) = Recorded By, (t) = Taken By, (c) = Cosigned By    Initials Name Provider Type    Dianna Barreto OT Occupational Therapist              Therapy Charges for Today     Code Description Service Date Service Provider Modifiers Qty    38547923255 HC OT SELF CARE/MGMT/TRAIN EA 15 MIN 11/5/2022 Dianna Siddiqi OT GO 1    44449453061 HC OT THER PROC EA 15 MIN 11/5/2022 Dianna Siddiqi OT GO 1               Dianna Siddiqi OT  11/5/2022

## 2022-11-05 NOTE — PLAN OF CARE
Goal Outcome Evaluation:  Plan of Care Reviewed With: patient        Progress: no change  Outcome Evaluation: Pt seems to be resting well, has been up in chair a couple of times today, no c/o pain, pt stated that she slept good last night, no changes, will continue to monitor

## 2022-11-06 RX ADMIN — FUROSEMIDE 40 MG: 40 TABLET ORAL at 17:45

## 2022-11-06 RX ADMIN — HYDRALAZINE HYDROCHLORIDE 5 MG: 25 TABLET, FILM COATED ORAL at 05:12

## 2022-11-06 RX ADMIN — PANTOPRAZOLE SODIUM 40 MG: 40 TABLET, DELAYED RELEASE ORAL at 05:12

## 2022-11-06 RX ADMIN — METOPROLOL TARTRATE 12.5 MG: 25 TABLET, FILM COATED ORAL at 08:27

## 2022-11-06 RX ADMIN — APIXABAN 5 MG: 5 TABLET, FILM COATED ORAL at 21:14

## 2022-11-06 RX ADMIN — Medication 10 ML: at 21:15

## 2022-11-06 RX ADMIN — Medication 10 ML: at 08:27

## 2022-11-06 RX ADMIN — Medication 10 MG: at 21:14

## 2022-11-06 RX ADMIN — HYDRALAZINE HYDROCHLORIDE 5 MG: 25 TABLET, FILM COATED ORAL at 13:40

## 2022-11-06 RX ADMIN — FUROSEMIDE 40 MG: 40 TABLET ORAL at 08:27

## 2022-11-06 RX ADMIN — ESCITALOPRAM 10 MG: 10 TABLET, FILM COATED ORAL at 08:27

## 2022-11-06 RX ADMIN — APIXABAN 5 MG: 5 TABLET, FILM COATED ORAL at 08:27

## 2022-11-06 RX ADMIN — HYDRALAZINE HYDROCHLORIDE 5 MG: 25 TABLET, FILM COATED ORAL at 21:14

## 2022-11-06 RX ADMIN — METOPROLOL TARTRATE 12.5 MG: 25 TABLET, FILM COATED ORAL at 21:16

## 2022-11-06 RX ADMIN — Medication 1 TABLET: at 08:27

## 2022-11-06 RX ADMIN — GABAPENTIN 300 MG: 300 CAPSULE ORAL at 21:15

## 2022-11-06 RX ADMIN — HYDROCODONE BITARTRATE AND ACETAMINOPHEN 1 TABLET: 7.5; 325 TABLET ORAL at 13:44

## 2022-11-06 RX ADMIN — TAMSULOSIN HYDROCHLORIDE 0.4 MG: 0.4 CAPSULE ORAL at 08:27

## 2022-11-06 NOTE — PLAN OF CARE
Goal Outcome Evaluation:  Plan of Care Reviewed With: patient        Progress: no change  Outcome Evaluation: Pt resting well in bed at this time. No complaints or acute changes. VSS. Will continue to monitor.

## 2022-11-06 NOTE — PLAN OF CARE
Goal Outcome Evaluation:  Plan of Care Reviewed With: patient        Progress: no change  Outcome Evaluation: Pt resting in bed at this time. Pt c/o pain, prn medication given per MAR. Vital signs stable. No complaints or concerns at this time.

## 2022-11-06 NOTE — PROGRESS NOTES
Patient is in a swing bed.    Pt still tired this AM, and laying in bed and not up to chair. Discussed negative workup from yesterday. She denied development of any other symptoms, and states she thinks she is 'just tired'.     No other acute complaints, no other acute events. Working with PT.    Next Full progress note due 11/7.

## 2022-11-07 PROCEDURE — 99308 SBSQ NF CARE LOW MDM 20: CPT | Performed by: INTERNAL MEDICINE

## 2022-11-07 PROCEDURE — 97110 THERAPEUTIC EXERCISES: CPT

## 2022-11-07 PROCEDURE — 97116 GAIT TRAINING THERAPY: CPT

## 2022-11-07 RX ADMIN — Medication 10 MG: at 21:32

## 2022-11-07 RX ADMIN — TAMSULOSIN HYDROCHLORIDE 0.4 MG: 0.4 CAPSULE ORAL at 08:06

## 2022-11-07 RX ADMIN — Medication 10 ML: at 09:14

## 2022-11-07 RX ADMIN — METOPROLOL TARTRATE 12.5 MG: 25 TABLET, FILM COATED ORAL at 21:32

## 2022-11-07 RX ADMIN — APIXABAN 5 MG: 5 TABLET, FILM COATED ORAL at 21:32

## 2022-11-07 RX ADMIN — GABAPENTIN 300 MG: 300 CAPSULE ORAL at 21:32

## 2022-11-07 RX ADMIN — HYDRALAZINE HYDROCHLORIDE 5 MG: 25 TABLET, FILM COATED ORAL at 15:03

## 2022-11-07 RX ADMIN — FUROSEMIDE 40 MG: 40 TABLET ORAL at 18:42

## 2022-11-07 RX ADMIN — PANTOPRAZOLE SODIUM 40 MG: 40 TABLET, DELAYED RELEASE ORAL at 05:25

## 2022-11-07 RX ADMIN — FUROSEMIDE 40 MG: 40 TABLET ORAL at 08:05

## 2022-11-07 RX ADMIN — APIXABAN 5 MG: 5 TABLET, FILM COATED ORAL at 08:06

## 2022-11-07 RX ADMIN — ESCITALOPRAM 10 MG: 10 TABLET, FILM COATED ORAL at 08:06

## 2022-11-07 RX ADMIN — HYDRALAZINE HYDROCHLORIDE 5 MG: 25 TABLET, FILM COATED ORAL at 05:25

## 2022-11-07 RX ADMIN — Medication 1 TABLET: at 08:06

## 2022-11-07 RX ADMIN — Medication 10 ML: at 21:32

## 2022-11-07 RX ADMIN — METOPROLOL TARTRATE 12.5 MG: 25 TABLET, FILM COATED ORAL at 08:05

## 2022-11-07 RX ADMIN — HYDRALAZINE HYDROCHLORIDE 5 MG: 25 TABLET, FILM COATED ORAL at 21:32

## 2022-11-07 NOTE — CASE MANAGEMENT/SOCIAL WORK
Discharge Planning Assessment  [facility]     Patient Name: Tre Kinney  MRN: [id number]  Today's Date: 11/7/2022    Admit Date: 10/5/2022    Plan: SS [facility] and Swing Bed [facility] have been in contact with pt's padmaja Dorsey about appeal process. SS to follow and assist.     Discharge Plan     Row Name 11/07/22 1645       Plan    Plan [facility] and Swing Bed [facility] have been in contact with pt's son Willian about appeal process. SS to follow and assist.              Continued Care and Services - Admitted Since 10/5/2022     Destination     Service Provider Request Status Selected Services Address Phone Fax Patient Preferred    [facility] Accepted N/A [address] [facility] [id number] [phone] --    [facility] Pending - Request Sent N/A [address] [facility] [address] [phone] --    [facility] Declined  Does not have a secondary payor source N/A [address] [facility] [place] [address] [phone] --            Selected Continued Care - Prior Encounters Includes continued care and service providers with selected services from prior encounters from 7/7/2022 to 11/7/2022    Discharged on 10/5/2022 Admission date: 9/19/2022 - Discharge disposition: Swing Bed    Destination     Service Provider Selected Services Address Phone Fax Patient Preferred    [facility] [place] [address] [phone] -- --                              XANDER Craig

## 2022-11-07 NOTE — PLAN OF CARE
Goal Outcome Evaluation:  Plan of Care Reviewed With: patient        Progress: no change  Outcome Evaluation: Pt rested well in bed this shift. Pt ambulated in room this shift and sat up in chair. No complaints or acute changes at this time. VSS. Will continue to monitor.

## 2022-11-07 NOTE — THERAPY TREATMENT NOTE
Acute Care - Physical Therapy Treatment Note   [place]     Patient Name: Tre Kinney  : 1932  MRN: [id number]  Today's Date: 2022   Onset of Illness/Injury or Date of Surgery: 10/06/22  Visit Dx:   No diagnosis found.  Patient Active Problem List   Diagnosis   • Acid reflux   • Ankle arthralgia   • Cardiac conduction disorder   • Closed fracture of distal fibula   • Arteriosclerosis of coronary artery   • Fracture of distal end of tibia   • Elevated cholesterol   • BP (high blood pressure)   • Hypertrophic polyarthritis   • OP (osteoporosis)   • Right heart failure (HCC)   • Heart failure, chronic, right-sided (HCC)   • SOB (shortness of breath)   • Palpitations   • CHF (congestive heart failure) (HCC)   • Congestive heart failure (HCC)   • Precordial pain   • Chronic anticoagulation   • Hypokalemia   • Anemia, unspecified type   • Generalized weakness     Past Medical History:   Diagnosis Date   • Anemia    • Arthritis    • CHF (congestive heart failure) (HCC)    • Chronic kidney disease    • Coronary artery disease    • H/O blood clots    • History of transfusion     no reaction    • Hypertension      Past Surgical History:   Procedure Laterality Date   • ABDOMINAL SURGERY     • APPENDECTOMY     • CARDIAC SURGERY     •  SECTION     • COLONOSCOPY N/A 3/7/2018    Procedure: COLONOSCOPY;  Surgeon: Willie Gresham MD;  Location:  [facility];  Service:    • CORONARY ANGIOPLASTY WITH STENT PLACEMENT     • ENDOSCOPY N/A 3/6/2018    Procedure: ESOPHAGOGASTRODUODENOSCOPY;  Surgeon: Willie Gresham MD;  Location:  [facility];  Service:    • PACEMAKER IMPLANTATION      left side    • SKIN BIOPSY     • TUBAL ABDOMINAL LIGATION       PT Assessment (last 12 hours)     PT Evaluation and Treatment     Row Name 22 1250          Physical Therapy Time and Intention    Subjective Information complains of;fatigue;pain  -CT     Document Type therapy note (daily note)  -CT     Mode of Treatment  physical therapy  -CT     Patient Effort adequate  -CT     Comment Pt treatment session performed [id number]. OT present to assist with transfer and ambulation. No family present, attempted to call Pelon Dorsey via [employer] with no answer. Pt was up in chair at begining of session. Pt performed seated ther ex and transfer to back to bed. Pt ambulated 3 ft during transfer and required constant cueing to remain upright during transfer.  -CT     Row Name 11/07/22 1250          General Information    Patient Profile Reviewed yes  -CT     Existing Precautions/Restrictions fall;other (see comments);TLSO  swan; pt declines use of TLSO  -CT     Row Name 11/07/22 1250          Cognition    Affect/Mental Status (Cognition) WFL  -CT     Orientation Status (Cognition) oriented x 4  -CT     Follows Commands (Cognition) WFL  -CT     Row Name 11/07/22 1250          Bed Mobility    Sit-Supine [place] (Bed Mobility) standby assist;verbal cues  -CT     Assistive Device (Bed Mobility) bed rails;draw sheet  -CT     Row Name 11/07/22 1250          Chair-Bed Transfer    Chair-Bed [place] (Transfers) maximum assist (25% patient effort);2 person assist;verbal cues;nonverbal cues (demo/gesture)  -CT     Assistive Device (Chair-Bed Transfers) walker, front-wheeled  -CT     Row Name 11/07/22 1250          Sit-Stand Transfer    Sit-Stand [place] (Transfers) moderate assist (50% patient effort);verbal cues;nonverbal cues (demo/gesture);maximum assist (25% patient effort)  -CT     Assistive Device (Sit-Stand Transfers) walker, front-wheeled  -CT     Row Name 11/07/22 1250          Stand-Sit Transfer    Stand-Sit [place] (Transfers) moderate assist (50% patient effort);maximum assist (25% patient effort);verbal cues;nonverbal cues (demo/gesture)  -CT     Assistive Device (Stand-Sit Transfers) walker, front-wheeled  -CT     Row Name 11/07/22 1250          Gait/Stairs (Locomotion)    [place] Level (Gait) maximum assist (25%  patient effort);2 person assist;verbal cues;nonverbal cues (demo/gesture)  -CT     Assistive Device (Gait) walker, front-wheeled  -CT     Distance in Feet (Gait) 3 ft during transfer to chair  -CT     Pattern (Gait) step-to  -CT     Deviations/Abnormal Patterns (Gait) gait speed decreased;festinating/shuffling;weight shifting decreased  -CT     Bilateral Gait Deviations weight shift ability decreased;forward flexed posture;knee buckling bilaterally  -CT     Comment, (Gait/Stairs) pt requires migel cueing to remain upright, pt reports increased fatigue this date  -CT     Row Name 11/07/22 1250          Motor Skills    Therapeutic Exercise --  BLE seated ther ex: LAQ, AP, seated marches, hip abd/add, hamstring curls yellow TB, glut sets 20 reps  -CT     Row Name 11/07/22 1250          Coping    Observed Emotional State calm;cooperative  -CT     Verbalized Emotional State acceptance  -CT     Row Name 11/07/22 1250          Plan of Care Review    Plan of Care Reviewed With patient  -CT     Row Name 11/07/22 1250          Positioning and Restraints    Pre-Treatment Position sitting in chair/recliner  -CT     Post Treatment Position bed  -CT     In Bed supine;call light within reach;encouraged to call for assist;side rails up x3;exit alarm on  -CT           User Key  (r) = Recorded By, (t) = Taken By, (c) = Cosigned By    Initials Name Provider Type    CT Bhargavi Vásquez PT Physical Therapist                Physical Therapy Education     Title: PT OT SLP Therapies (Done)     Topic: Physical Therapy (Done)     Point: Mobility training (Done)     Learning Progress Summary           Patient Acceptance, E,D, VU,NR by LL at 11/5/2022 1325    Acceptance, E, VU by EB at 11/4/2022 1448    Acceptance, E,TB, VU by KM at 11/4/2022 1101                   Point: Home exercise program (Done)     Learning Progress Summary           Patient Acceptance, E,D, VU,NR by LL at 11/5/2022 1325    Acceptance, E, VU by EB at 11/4/2022 1448     Acceptance, E,TB, VU by KM at 11/4/2022 1101                   Point: Body mechanics (Done)     Learning Progress Summary           Patient Acceptance, E,D, VU,NR by LL at 11/5/2022 1325    Acceptance, E, VU by EB at 11/4/2022 1448    Acceptance, E,TB, VU by KM at 11/4/2022 1101                   Point: Precautions (Done)     Learning Progress Summary           Patient Acceptance, E,D, VU,NR by  at 11/5/2022 1325    Acceptance, E, VU by EB at 11/4/2022 1448    Acceptance, E,TB, VU by KM at 11/4/2022 1101                               User Key     Initials Effective Dates Name Provider Type Discipline     05/02/16 -  Neda Garcia PTA Physical Therapist Assistant PT     05/24/22 -  Mao Vázquez PT Physical Therapist PT    EB 09/22/22 -  Cedric Oliver RN Registered Nurse Nurse              PT Recommendation and Plan  Anticipated Discharge Disposition (PT): home with 24/7 [facility], skilled nursing facility  Planned Therapy Interventions (PT): balance training, bed mobility training, gait training, home exercise program, motor coordination training, manual therapy techniques, neuromuscular re-education, postural re-education, patient/family education, strengthening, transfer training  Therapy Frequency (PT): 5 times/wk (5-6 times/wk)  Progress Summary (PT)  Progress Toward Functional Goals (PT): progress toward functional goals is fair  Plan of Care Reviewed With: patient  Progress: no change   Outcome Measures     Row Name 11/05/22 1300             How much help from another person do you currently need...    Turning from your back to your side while in flat bed without using bedrails? 3  -LL      Moving from lying on back to sitting on the side of a flat bed without bedrails? 2  -LL      Moving to and from a bed to a chair (including a wheelchair)? 2  -LL      Standing up from a chair using your arms (e.g., wheelchair, bedside chair)? 2  -LL      Climbing 3-5 steps with a railing? 2  -LL      To walk in  hospital room? 2  -LL      AM-PAC 6 Clicks Score (PT) 13  -LL         How much help from another is currently needed...    Putting on and taking off regular lower body clothing? 2  -LA      Bathing (including washing, rinsing, and drying) 2  -LA      Toileting (which includes using toilet bed pan or urinal) 2  -LA      Putting on and taking off regular upper body clothing 3  -LA      Taking care of personal grooming (such as brushing teeth) 3  -LA      Eating meals 4  -LA      AM-PAC 6 Clicks Score (OT) 16  -LA         Functional Assessment    Outcome Measure Options AM-PAC 6 Clicks Daily Activity (OT)  -LA            User Key  (r) = Recorded By, (t) = Taken By, (c) = Cosigned By    Initials Name Provider Type    LL Neda Garcia PTA Physical Therapist Assistant    Dianna Barreto, OT Occupational Therapist                 Time Calculation:    PT Charges     Row Name 11/07/22 1457             Time Calculation    Start Time 1250  -CT      Stop Time 1329  -CT      Time Calculation (min) 39 min  -CT      PT Received On 11/07/22  -CT            User Key  (r) = Recorded By, (t) = Taken By, (c) = Cosigned By    Initials Name Provider Type    CT Bhargavi Vásquez, PT Physical Therapist              Therapy Charges for Today     Code Description Service Date Service Provider Modifiers Qty    95096240924 HC GAIT TRAINING EA 15 MIN 11/7/2022 Bhargavi Vásquez, PT GP 1    34439649896 HC PT THER PROC EA 15 MIN 11/7/2022 Bhargavi Vásquez, PT GP 1          PT G-Codes  Outcome Measure Options: AM-PAC 6 Clicks Daily Activity (OT)  AM-PAC 6 Clicks Score (PT): 12  AM-PAC 6 Clicks Score (OT): 16    Bhargavi Vásquez PT  11/7/2022

## 2022-11-07 NOTE — CASE MANAGEMENT/SOCIAL WORK
Director Ileana Mckinley attempted to contact Willian Kinney to confirm receipt of emailed form. No answer left VM.

## 2022-11-07 NOTE — THERAPY TREATMENT NOTE
Acute Care - Occupational Therapy Treatment Note     [place]     Patient Name: Tre Kinney  : 1932  MRN: [id number]    Today's Date: 2022  Onset of Illness/Injury or Date of Surgery: 10/06/22       Referring Physician: Janneth      Admit Date: 10/5/2022       No diagnosis found.    Patient Active Problem List   Diagnosis   • Acid reflux   • Ankle arthralgia   • Cardiac conduction disorder   • Closed fracture of distal fibula   • Arteriosclerosis of coronary artery   • Fracture of distal end of tibia   • Elevated cholesterol   • BP (high blood pressure)   • Hypertrophic polyarthritis   • OP (osteoporosis)   • Right heart failure (HCC)   • Heart failure, chronic, right-sided (HCC)   • SOB (shortness of breath)   • Palpitations   • CHF (congestive heart failure) (HCC)   • Congestive heart failure (HCC)   • Precordial pain   • Chronic anticoagulation   • Hypokalemia   • Anemia, unspecified type   • Generalized weakness       Past Medical History:   Diagnosis Date   • Anemia    • Arthritis    • CHF (congestive heart failure) ([facility])    • Chronic kidney disease    • Coronary artery disease    • H/O blood clots    • History of transfusion     no reaction    • Hypertension        Past Surgical History:   Procedure Laterality Date   • ABDOMINAL SURGERY     • APPENDECTOMY     • CARDIAC SURGERY     •  SECTION     • COLONOSCOPY N/A 3/7/2018    Procedure: COLONOSCOPY;  Surgeon: Willie Gresham MD;  Location:  [facility];  Service:    • CORONARY ANGIOPLASTY WITH STENT PLACEMENT     • ENDOSCOPY N/A 3/6/2018    Procedure: ESOPHAGOGASTRODUODENOSCOPY;  Surgeon: Willie Gresham MD;  Location:  [facility];  Service:    • PACEMAKER IMPLANTATION      left side    • SKIN BIOPSY     • TUBAL ABDOMINAL LIGATION               IRF OT ASSESSMENT FLOWSHEET (last 12 hours)     IRF OT Evaluation and Treatment    No documentation.                          OT Recommendation and Plan    Anticipated Discharge  Disposition (OT): skilled nursing facility, home with 24/7 care, home with home health    Progress Toward Functional Goals (OT): progress toward functional goals is gradual (still significantly limited due to poor tolerance/balance in standing impacting functional transfers)  Plan of Care Review  Plan of Care Reviewed With: patientpadmaja  Progress: improving  Outcome Evaluation: Patient seen for OT treatment session. She was assisted from bed to chair using rolling walker with moderate/maximal assist. Fair tolerance for seated UE exercises on this date. Patient with improved alertness and socialization with therapist on this date, overall endurance improved on this date with patient seen before lunch versus afternoon. Continue plan of care.  Plan of Care Reviewed With: patientpadmaja  Progress: improving       Outcome Measures     Row Name 11/05/22 1300             How much help from another person do you currently need...    Turning from your back to your side while in flat bed without using bedrails? 3  -LL      Moving from lying on back to sitting on the side of a flat bed without bedrails? 2  -LL      Moving to and from a bed to a chair (including a wheelchair)? 2  -LL      Standing up from a chair using your arms (e.g., wheelchair, bedside chair)? 2  -LL      Climbing 3-5 steps with a railing? 2  -LL      To walk in hospital room? 2  -LL      AM-PAC 6 Clicks Score (PT) 13  -LL         How much help from another is currently needed...    Putting on and taking off regular lower body clothing? 2  -LA      Bathing (including washing, rinsing, and drying) 2  -LA      Toileting (which includes using toilet bed pan or urinal) 2  -LA      Putting on and taking off regular upper body clothing 3  -LA      Taking care of personal grooming (such as brushing teeth) 3  -LA      Eating meals 4  -LA      AM-PAC 6 Clicks Score (OT) 16  -LA         Functional Assessment    Outcome Measure Options AM-PAC 6 Clicks Daily Activity (OT)   -LA            User Key  (r) = Recorded By, (t) = Taken By, (c) = Cosigned By    Initials Name Provider Type    Neda Kyle PTA Physical Therapist Assistant    Dianna Barreto OT Occupational Therapist                  Time Calculation:      Time Calculation- OT     Row Name 11/07/22 1353             Time Calculation- OT    OT Start Time 1250  -KP      OT Stop Time 1330  -      OT Time Calculation (min) 40 min  -      Total Timed Code Minutes- OT 40 minute(s)  -            User Key  (r) = Recorded By, (t) = Taken By, (c) = Cosigned By    Initials Name Provider Type    Linn Richardson OT Occupational Therapist              Therapy Charges for Today     Code Description Service Date Service Provider Modifiers Qty    20953476322 HC OT THER PROC EA 15 MIN 11/7/2022 Linn Quevedo OT GO 1                   Linn Quevedo OT  11/7/2022

## 2022-11-07 NOTE — CASE MANAGEMENT/SOCIAL WORK
Attempted to contact son Willian to verify receipt of email sent by Ileana Mckinley no answer sent to [email or web address]. 15:32 Received a return call from Willian he states that he is just opening the email and will complete the form and return it Ileana HOLDER.

## 2022-11-07 NOTE — PROGRESS NOTES
"Palliative Care Daily Progress Note     S: Clinically, the patient is making slow progress.  She has been discouraged but does state that she feels stronger this week.  She continues to participate in therapies with ultimate goal of getting home with her son.      O:   Palliative Performance Scale Score:     /64 (BP Location: Right arm, Patient Position: Lying)   Pulse 77   Temp 97.7 °F (36.5 °C) (Oral)   Resp 16   Ht 170.2 cm (67.01\")   Wt 75.2 kg (165 lb 12.8 oz)   SpO2 98%   BMI 25.96 kg/m²     Intake/Output Summary (Last 24 hours) at 11/7/2022 1101  Last data filed at 11/7/2022 0700  Gross per 24 hour   Intake 830 ml   Output 1650 ml   Net -820 ml       PE:  General Appearance:   alert, cooperative, NAD   HEENT:    NC/AT    Neck:   supple   Lungs:     CTAB    Heart:    RRR   Abdomen:     Soft, NT    Extremities:   Moves all extremities, no edema   Pulses:   Pulses palpable    Skin:   Warm, dry   Neurologic:   Awake, cooperative, speech and mentation are intact   Psych:   Calm, appropriate         Meds: Reviewed     Labs:   Results from last 7 days   Lab Units 11/05/22  1049   WBC 10*3/mm3 7.66   HEMOGLOBIN g/dL 10.7*   HEMATOCRIT % 33.3*   PLATELETS 10*3/mm3 252     Results from last 7 days   Lab Units 11/05/22  1049   SODIUM mmol/L 138   POTASSIUM mmol/L 3.9   CHLORIDE mmol/L 99   CO2 mmol/L 26.0   BUN mg/dL 31*   CREATININE mg/dL 1.96*   GLUCOSE mg/dL 104*   CALCIUM mg/dL 8.8     Results from last 7 days   Lab Units 11/05/22  1049   SODIUM mmol/L 138   POTASSIUM mmol/L 3.9   CHLORIDE mmol/L 99   CO2 mmol/L 26.0   BUN mg/dL 31*   CREATININE mg/dL 1.96*   CALCIUM mg/dL 8.8   BILIRUBIN mg/dL 0.4   ALK PHOS U/L 51   ALT (SGPT) U/L <5   AST (SGOT) U/L 11   GLUCOSE mg/dL 104*     Imaging Results (Last 72 Hours)     Procedure Component Value Units Date/Time    XR Chest PA & Lateral [[id number]] Collected: 11/05/22 1407     Updated: 11/05/22 1410    Narrative:      EXAMINATION: XR CHEST PA AND LATERAL-    "   CLINICAL INDICATION: Worsening tiredness, new cough        COMPARISON: 10/01/2022      TECHNIQUE: XR CHEST PA AND LATERAL-      FINDINGS:   LUNGS: Lungs are adequately aerated.      HEART AND MEDIASTINUM: Large hiatal hernia     SKELETON: Bony and soft tissue structures are unremarkable.             Impression:      Large hiatal hernia.  Lungs are adequately aerated     This report was finalized on 11/5/2022 2:08 PM by Dr. Bob Lovell MD.               Diagnostics: Reviewed    A: Clinically improved with slow progress in therapies.      P: Continue present management.  We will continue to offer encouragement and support.      We will continue to follow along. Please do not hesitate to contact us regarding further sx mgmt or GOC needs, including after hours or on weekends via our on call provider at [phone].     Farzad Vazquez MD    11/7/2022

## 2022-11-07 NOTE — PROGRESS NOTES
[facility] HOSPITALIST PROGRESS NOTE    Subjective     History:   Tre Kinney is a 89 y.o. female admitted on 10/5/2022 secondary to <principal problem not specified>     Procedures: None    CC: Follow up debility    Patient seen and examined with SONAM Gagnon. Awake and alert. Sitting in bedside chair during my encounter. Continues to report weakness and fatigue. Leg pain appears much improved. No reported CP. No reported nausea or vomiting. No acute events overnight per RN.     History taken from: patient, chart, and RN.      Objective     Vital Signs  Temp:  [97.7 °F (36.5 °C)] 97.7 °F (36.5 °C)  Heart Rate:  [75-84] 77  Resp:  [16] 16  BP: (112-149)/(60-75) 148/74    Intake/Output Summary (Last 24 hours) at 11/7/2022 1527  Last data filed at 11/7/2022 1300  Gross per 24 hour   Intake 1040 ml   Output 1200 ml   Net -160 ml         Physical Exam:  General:    Awake, alert, in no acute distress, chronically ill appearing   Heart:      Normal S1 and S2. Regular rate and rhythm. No significant murmur, rubs or gallops appreciated.   Lungs:     Respirations regular, even and unlabored. Lungs clear to auscultation B/L. No wheezes, rales or rhonchi.   Abdomen:   Soft and nontender. No guarding, rebound tenderness or  organomegaly noted. Bowel sounds present x 4.   Extremities:  No lower extremity edema. Moves UE and LE equally B/L.     Results Review:    Results from last 7 days   Lab Units 11/05/22  1049   WBC 10*3/mm3 7.66   HEMOGLOBIN g/dL 10.7*   PLATELETS 10*3/mm3 252     Results from last 7 days   Lab Units 11/05/22  1049 11/01/22  1658   SODIUM mmol/L 138  --    POTASSIUM mmol/L 3.9 4.3   CHLORIDE mmol/L 99  --    CO2 mmol/L 26.0  --    BUN mg/dL 31*  --    CREATININE mg/dL 1.96*  --    CALCIUM mg/dL 8.8  --    GLUCOSE mg/dL 104*  --      Results from last 7 days   Lab Units 11/05/22  1049   BILIRUBIN mg/dL 0.4   ALK PHOS U/L 51   AST (SGOT) U/L 11   ALT (SGPT) U/L <5                   Imaging  Results (Last 24 Hours)     ** No results found for the last 24 hours. **            Medications:  apixaban, 5 mg, Oral, Q12H  escitalopram, 10 mg, Oral, Daily  furosemide, 40 mg, Oral, BID  gabapentin, 300 mg, Oral, Nightly  hydrALAZINE, 5 mg, Oral, Q8H  melatonin, 10 mg, Oral, Nightly  metoprolol tartrate, 12.5 mg, Oral, Q12H  multivitamin, 1 tablet, Oral, Daily  pantoprazole, 40 mg, Oral, Q AM  sodium chloride, 10 mL, Intravenous, Q12H  sodium chloride, 10 mL, Intravenous, Q12H  tamsulosin, 0.4 mg, Oral, Daily      Pharmacy Consult,             Assessment & Plan   Generalized weakness/debility  Diffuse LLE DVT  DIANA on CKD IV  Acute blood loss anemia  Traumatic right gluteal hematoma  Mild hypokalemia   Paroxysmal Afib  Hx of SSS s/p pacemaker placement  Essential HTN  Reported hx of DVT's   Anxiety/depression  Advanced age    Plan  BP currently stable and controlled on current regimen. Appears to be tolerating Eliquis well with no obvious signs of bleeding. Reviewed labs from 11/5 which revealed stable H&H and stable renal function. Cont PT/OT. Cont supportive treatment. Repeat labs in the AM.     Disposition: Awaiting response from insurance company regarding grievance filed by family. Will need further discussions with patient and family regarding disposition at discharge.       Mustapha Loredo DO  11/07/22  15:27 EST

## 2022-11-07 NOTE — PLAN OF CARE
"Goal Outcome Evaluation:  Plan of Care Reviewed With: patient           Outcome Evaluation: patient has slept well today, transferred to chair for breakfast. attempted to transfer to chair for dinner with PCA Amy but patient's legs began to buckle and feet slid in a forward motion. pt states \"I am falling, I cannot do this.\" Patient is now resting well in bed with no complaints or acute changes.  "

## 2022-11-07 NOTE — CASE MANAGEMENT/SOCIAL WORK
Discussed with patient that Natali requires an appointment of representative form to be completed for a family member to represent her appeal. She completed form and appointed her son Willian. Form was forwarded Willian by Ileana Mckinley via secure email for completion. It will be faxed to Natali upon receipt of the completed form with required signatures.

## 2022-11-07 NOTE — PAYOR COMM NOTE
"Michelet Jacques RN  Swing Bed Nurse  [phone] Ex [phone] FAX: [phone]  [email or web address]  [facility]  NPI [id number]  Reference Number : Appeal documentation requested by Tre Perea (89 y.o. Female)    YOB: 1932  Social Security Number:   Address: [address] [place] [address]  Home Phone: [phone]  MRN: [id number]  Rastafari: Confucianist  Marital Status:         Admission Date: 10/5/22  Admission Type: Urgent  Admitting Provider: Jannet Lagunas MD  Attending Provider: Mustapha Loredo DO  Department, Room/Bed: [address], [facility]/  Discharge Date:   Discharge Disposition:   Discharge Destination:               Attending Provider: Mustapha Loredo DO    Allergies: Adhesive Tape, Xanax [Alprazolam]    Isolation: None   Infection: None   Code Status: No CPR    Ht: 170.2 cm (67.01\")   Wt: 75.2 kg (165 lb 12.8 oz)    Admission Cmt: None   Principal Problem: None                Active Insurance as of 10/5/2022     Primary Coverage     Payor Plan Insurance Group Employer/Plan Group    NILAM MEDICARE REPLACEMENT NILAM MEDICARE ADVANTAGE [id number]     Payor Plan Address Payor Plan Phone Number Payor Plan Fax Number Effective Dates    [address] [phone]  1/1/2022 - None Entered    [facility] [id number]       Subscriber Name Subscriber Birth Date Member ID       TRE KINNEY 12/9/1932 [id number]                 Emergency Contacts      (Rel.) Home Phone Work Phone Mobile Phone    Valdez Kinney (Son) [phone] None None    GregoriaYovannyy (Son) [phone] None [phone]    GregoriaRyany (Son) None None [phone]               Physician Progress Notes (most recent note)      Kennedi Saucdeo MD at 11/06/22 1446        Patient is in a swing bed.    Pt still tired this AM, and laying in bed and not up to chair. Discussed negative workup from yesterday. She " denied development of any other symptoms, and states she thinks she is 'just tired'.     No other acute complaints, no other acute events. Working with PT.    Next Full progress note due 11/7.    Electronically signed by Kennedi Saucedo MD at 11/06/22 9472          Consult Notes (most recent note)      Andrew Chapman MD at 11/03/22 1249      Consult Orders    1. Inpatient Psychiatrist Consult [[id number]] ordered by Kennedi Saucedo MD at 11/03/22 0744    2. Inpatient Psychiatrist Consult [[id number]] ordered by Kennedi Saucedo MD at 10/31/22 1147                   Referring Provider: Lewis  Reason for Consultation: Capacity      Chief complaint/Focus of Exam: Capacity Evaluation     Subjective .     History of present illness: Patient is an 89-year-old female with a history significant for CHF, CKD, CAD, history of clots, chronic anticoagulation, sick sinus syndrome with pacer placement, chronic diastolic CHF, anemia, and depression who originally admitted to the hospital after a fall.  Psychiatry was originally consulted early in her hospitalization 1 month ago for depressive symptoms and for medication management in light of hyponatremia.  I evaluated patient at that time.  Psychiatry was reconsulted to evaluate patient for medical decision-making capacity as there have been some concerns about treatment care planning going forward and family has been involved.  I evaluated patient at bedside today while she was eating lunch.  She was sitting up and reported that she was having some neck pain but denied any other complaints.  She was alert and oriented x4.  She is able to answer all my questions appropriately.  She was not experiencing any confusion, hallucinations, delusions, or paranoia.  She denied SI/HI/AVH.  She was making jokes about being in the hospital and seemed to be in good spirits despite her neck pain.    Review of Systems  Pertinent items are noted in HPI    History  Past Medical  History:   Diagnosis Date   • Anemia    • Arthritis    • CHF (congestive heart failure) ([facility])    • Chronic kidney disease    • Coronary artery disease    • H/O blood clots    • History of transfusion     no reaction    • Hypertension    ,   Past Surgical History:   Procedure Laterality Date   • ABDOMINAL SURGERY     • APPENDECTOMY     • CARDIAC SURGERY     •  SECTION     • COLONOSCOPY N/A 3/7/2018    Procedure: COLONOSCOPY;  Surgeon: Willie Gresham MD;  Location:  [facility];  Service:    • CORONARY ANGIOPLASTY WITH STENT PLACEMENT     • ENDOSCOPY N/A 3/6/2018    Procedure: ESOPHAGOGASTRODUODENOSCOPY;  Surgeon: Willie Gresham MD;  Location:  [facility];  Service:    • PACEMAKER IMPLANTATION      left side    • SKIN BIOPSY     • TUBAL ABDOMINAL LIGATION     ,   Family History   Problem Relation Age of Onset   • Other Mother         cardiac disorder   • Hypertension Mother    • Heart disease Mother    • Thyroid disease Mother    • Other Father         cardiac disorder   • Heart disease Father    • Hypertension Father    • Other Sister         cardiac disorder   • Other Brother         cardiac disorder   • Other Other         cardiac disorder   • Other Other         cardiac disorder   ,   Social History     Socioeconomic History   • Marital status:    Tobacco Use   • Smoking status: Never   Substance and Sexual Activity   • Alcohol use: No   • Drug use: No   • Sexual activity: Defer     E-cigarette/Vaping     E-cigarette/Vaping Substances     E-cigarette/Vaping Devices       ,   Medications Prior to Admission   Medication Sig Dispense Refill Last Dose   • ALPRAZolam (XANAX) 1 MG tablet Take 1 mg by mouth 2 (Two) Times a Day As Needed for Anxiety.   Unknown at Unknown time   • aspirin 81 MG EC tablet Take 81 mg by mouth Daily.   Unknown at Unknown time   • escitalopram (LEXAPRO) 10 MG tablet Take 10 mg by mouth Daily.   Unknown at Unknown time   • furosemide (LASIX) 40 MG tablet Take 40 mg by  mouth Daily.   Unknown at Unknown time   • gabapentin (NEURONTIN) 400 MG capsule Take 400 mg by mouth Every Night.   Unknown at Unknown time   • hydrALAZINE (APRESOLINE) 100 MG tablet Take 1 tablet by mouth Every 8 (Eight) Hours. 90 tablet 0 Unknown at Unknown time   • HYDROcodone-acetaminophen (NORCO) 7.5-325 MG per tablet Take 1 tablet by mouth Every 8 (Eight) Hours As Needed for Moderate Pain.   Unknown at Unknown time   • isosorbide mononitrate (IMDUR) 60 MG 24 hr tablet Take 60 mg by mouth Daily.   Unknown at Unknown time   • metoprolol tartrate (LOPRESSOR) 50 MG tablet Take 50 mg by mouth 2 (Two) Times a Day.   Unknown at Unknown time   • pantoprazole (PROTONIX) 40 MG EC tablet Take 40 mg by mouth Daily.   Unknown at Unknown time   • potassium chloride (MICRO-K) 10 MEQ CR capsule Take 10 mEq by mouth 2 (Two) Times a Day.   Unknown at Unknown time   • warfarin (COUMADIN) 6 MG tablet Take 6 mg by mouth Daily.   Unknown at Unknown time   , Scheduled Meds:  apixaban, 5 mg, Oral, Q12H  escitalopram, 10 mg, Oral, Daily  furosemide, 40 mg, Oral, BID  gabapentin, 300 mg, Oral, Nightly  hydrALAZINE, 5 mg, Oral, Q8H  melatonin, 10 mg, Oral, Nightly  metoprolol tartrate, 12.5 mg, Oral, Q12H  multivitamin, 1 tablet, Oral, Daily  pantoprazole, 40 mg, Oral, Q AM  sodium chloride, 10 mL, Intravenous, Q12H  sodium chloride, 10 mL, Intravenous, Q12H  tamsulosin, 0.4 mg, Oral, Daily    , Continuous Infusions:  Pharmacy Consult,     , PRN Meds:  •  benzocaine-menthol  •  docusate sodium  •  HYDROcodone-acetaminophen  •  magnesium sulfate **OR** magnesium sulfate **OR** magnesium sulfate  •  ondansetron  •  Pharmacy Consult  •  phenol  •  polyethylene glycol  •  potassium chloride **OR** potassium chloride **OR** potassium chloride  •  prochlorperazine **OR** prochlorperazine **OR** prochlorperazine  •  promethazine  •  sodium chloride  •  sodium chloride  •  sodium chloride and Allergies:  Adhesive tape and Xanax  [alprazolam]    Objective     Vital Signs   Temp:  [98.2 °F (36.8 °C)-98.4 °F (36.9 °C)] 98.2 °F (36.8 °C)  Heart Rate:  [71-77] 75  Resp:  [16-18] 16  BP: (129-161)/() 161/116    Mental Status Exam:   Mental Status Exam:    Hygiene:   good  Cooperation:  Cooperative  Eye Contact:  Good  Psychomotor Behavior:  Slow  Affect:  Full range  Hopelessness: Optimistic  Speech:  Normal  Thought Progress:  Goal directed and Linear  Thought Content:  Normal and Mood congruent  Suicidal:  None  Homicidal:  None  Hallucinations:  None  Delusion:  None  Memory:  Intact  Orientation:  Person, Place, Time and Situation  Reliability:  good  Insight:  Fair  Judgement:  Fair  Impulse Control:  Fair    Results Review:   I reviewed the patient's new clinical results.  Lab Results (last 24 hours)     ** No results found for the last 24 hours. **        Imaging Results (Last 24 Hours)     ** No results found for the last 24 hours. **            Assessment & Plan     Evaluation for medical decision making capacity  -At this point in time, patient does have capacity for medical decision making.  She is alert and oriented and able to answer questions appropriately and I do not have any concerns about her making her own treatment plan and decisions going forward.    I discussed the patients findings and my recommendations with patient    Andrew Chapman MD  11/03/22  12:49 EDT        Electronically signed by Andrew Chapman MD at 11/03/22 1253          Nutrition Notes (most recent note)      Jose Oneal RD at 11/03/22 7705        Nutrition Services    Patient Name:  Tre Kinney  YOB: 1932  MRN: [id number]  Admit Date:  10/5/2022    BMI:  25.68  Diet: Soft texture, chopped, thin liquids  Supplement Magic Cup BID  Intake: 69% avg over 9 meals  Fluid: not meeting needs.    Electronically signed by:  Jose Oneal RD  11/03/22 15:37 EDT     Electronically signed by Jose Oneal RD at 11/03/22 5810           Physical Therapy Notes (most recent note)      Neda Garcia, PTA at 22 7225  Version 1 of 1         Acute Care - Physical Therapy Treatment Note   [place]     Patient Name: Tre Kinney  : 1932  MRN: [id number]  Today's Date: 2022   Onset of Illness/Injury or Date of Surgery: 10/06/22  Visit Dx:   No diagnosis found.  Patient Active Problem List   Diagnosis   • Acid reflux   • Ankle arthralgia   • Cardiac conduction disorder   • Closed fracture of distal fibula   • Arteriosclerosis of coronary artery   • Fracture of distal end of tibia   • Elevated cholesterol   • BP (high blood pressure)   • Hypertrophic polyarthritis   • OP (osteoporosis)   • Right heart failure (HCC)   • Heart failure, chronic, right-sided (HCC)   • SOB (shortness of breath)   • Palpitations   • CHF (congestive heart failure) (HCC)   • Congestive heart failure (HCC)   • Precordial pain   • Chronic anticoagulation   • Hypokalemia   • Anemia, unspecified type   • Generalized weakness     Past Medical History:   Diagnosis Date   • Anemia    • Arthritis    • CHF (congestive heart failure) ([facility])    • Chronic kidney disease    • Coronary artery disease    • H/O blood clots    • History of transfusion     no reaction    • Hypertension      Past Surgical History:   Procedure Laterality Date   • ABDOMINAL SURGERY     • APPENDECTOMY     • CARDIAC SURGERY     •  SECTION     • COLONOSCOPY N/A 3/7/2018    Procedure: COLONOSCOPY;  Surgeon: Willie Gresham MD;  Location:  [facility];  Service:    • CORONARY ANGIOPLASTY WITH STENT PLACEMENT     • ENDOSCOPY N/A 3/6/2018    Procedure: ESOPHAGOGASTRODUODENOSCOPY;  Surgeon: Willie Gresham MD;  Location:  [facility];  Service:    • PACEMAKER IMPLANTATION      left side    • SKIN BIOPSY     • TUBAL ABDOMINAL LIGATION       PT Assessment (last 12 hours)     PT Evaluation and Treatment     Row Name 22 1215          Physical Therapy Time and Intention    Subjective  Information no complaints  -LL     Document Type therapy note (daily note)  -LL     Mode of Treatment physical therapy  -LL     Patient Effort adequate  -LL     Comment PT session performed 12:15-12:40 this date with assitance from VELASQUEZ Bustamante.  Attempted to facetime family per request, but there was no answer.  Patient was compliant with activities this date and required frequent rest breaks.  Upon arrival, patient was in bed and required mod A for bed mobility, mod/max A for sit <> stand and mod/max A x 2 for 5-6 steps with use of RW for bed > chair.  Patient performed sitting ex's with LE's with rest breaks.  Following session, patient left sitting upright in recliner with tray table in front of her and nursing staff was notified.  -LL     Row Name 11/05/22 1212          General Information    Patient Profile Reviewed yes  -LL     Existing Precautions/Restrictions fall;other (see comments);TLSO  swan; pt declines use of TLSO  -LL     Row Name 11/05/22 9978          Cognition    Affect/Mental Status (Cognition) WFL  -LL     Orientation Status (Cognition) oriented x 4  -LL     Follows Commands (Cognition) WFL  -LL     Personal Safety Interventions fall prevention program maintained;gait belt;nonskid shoes/slippers when out of bed  -LL     Row Name 11/05/22 5398          Bed Mobility    Rolling Left [place] (Bed Mobility) --  -LL     Rolling Right [place] (Bed Mobility) --  -LL     Scooting/Bridging [place] (Bed Mobility) --  -LL     Supine-Sit [place] (Bed Mobility) 1 person assist;verbal cues;nonverbal cues (demo/gesture);moderate assist (50% patient effort)  -LL     Bed Mobility, Safety Issues decreased use of legs for bridging/pushing;decreased use of arms for pushing/pulling;impaired trunk control for bed mobility  log roll  -LL     Assistive Device (Bed Mobility) bed rails;draw sheet  -LL     Row Name 11/05/22 8720          Chair-Bed Transfer    Chair-Bed [place] (Transfers) moderate  assist (50% patient effort);2 person assist;verbal cues;nonverbal cues (demo/gesture);maximum assist (25% patient effort)  -LL     Assistive Device (Chair-Bed Transfers) walker, front-wheeled  -LL     Row Name 11/05/22 1215          Sit-Stand Transfer    Sit-Stand [place] (Transfers) moderate assist (50% patient effort);verbal cues;nonverbal cues (demo/gesture);maximum assist (25% patient effort)  -LL     Assistive Device (Sit-Stand Transfers) walker, front-wheeled  -LL     Row Name 11/05/22 1215          Stand-Sit Transfer    Stand-Sit [place] (Transfers) moderate assist (50% patient effort);verbal cues;nonverbal cues (demo/gesture);maximum assist (25% patient effort)  -LL     Assistive Device (Stand-Sit Transfers) walker, front-wheeled  -LL     Row Name 11/05/22 1215          Gait/Stairs (Locomotion)    [place] Level (Gait) moderate assist (50% patient effort);2 person assist;verbal cues;nonverbal cues (demo/gesture);maximum assist (25% patient effort)  constant verbal cues for upright posture  -LL     Assistive Device (Gait) walker, front-wheeled  -LL     Distance in Feet (Gait) 5-6 steps  -LL     Pattern (Gait) step-to  -LL     Deviations/Abnormal Patterns (Gait) gait speed decreased;festinating/shuffling;weight shifting decreased  -LL     Bilateral Gait Deviations weight shift ability decreased;forward flexed posture;knee buckling bilaterally  -     Row Name 11/05/22 1215          Motor Skills    Therapeutic Exercise --  LAQ, marching, GS, hip abd/add, AP; RROM w/ red theraband including  hamstring curls and hip abduction x 10 reps each with rest breaks inbetween.  -LL     Row Name 11/05/22 1215          Coping    Observed Emotional State calm;cooperative  -LL     Verbalized Emotional State acceptance  -     Row Name 11/05/22 1215          Positioning and Restraints    Pre-Treatment Position in bed  -LL     In Bed sitting;notified nsg;call light within reach;encouraged to call for assist   With tray table in front of her  -LL           User Key  (r) = Recorded By, (t) = Taken By, (c) = Cosigned By    Initials Name Provider Type    LL Neda Garcia PTA Physical Therapist Assistant                Physical Therapy Education     Title: PT OT SLP Therapies (Done)     Topic: Physical Therapy (Done)     Point: Mobility training (Done)     Learning Progress Summary           Patient Acceptance, E,D, VU,NR by LL at 11/5/2022 1325    Acceptance, E, VU by EB at 11/4/2022 1448    Acceptance, E,TB, VU by KM at 11/4/2022 1101                   Point: Home exercise program (Done)     Learning Progress Summary           Patient Acceptance, E,D, VU,NR by LL at 11/5/2022 1325    Acceptance, E, VU by EB at 11/4/2022 1448    Acceptance, E,TB, VU by KM at 11/4/2022 1101                   Point: Body mechanics (Done)     Learning Progress Summary           Patient Acceptance, E,D, VU,NR by LL at 11/5/2022 1325    Acceptance, E, VU by EB at 11/4/2022 1448    Acceptance, E,TB, VU by KM at 11/4/2022 1101                   Point: Precautions (Done)     Learning Progress Summary           Patient Acceptance, E,D, VU,NR by LL at 11/5/2022 1325    Acceptance, E, VU by EB at 11/4/2022 1448    Acceptance, E,TB, VU by KM at 11/4/2022 1101                               User Key     Initials Effective Dates Name Provider Type Discipline    LL 05/02/16 -  Neda Garcia PTA Physical Therapist Assistant PT    KM 05/24/22 -  Mao Vázquez PT Physical Therapist PT    EB 09/22/22 -  Cedric Oliver, RN Registered Nurse Nurse              PT Recommendation and Plan         Outcome Measures     Row Name 11/05/22 1300 11/04/22 1345 11/03/22 1310       How much help from another person do you currently need...    Turning from your back to your side while in flat bed without using bedrails? 3  -LL -- 2  -KH    Moving from lying on back to sitting on the side of a flat bed without bedrails? 2  -LL -- 2  -KH    Moving to and from a bed to a  chair (including a wheelchair)? 2  -LL -- 2  -KH    Standing up from a chair using your arms (e.g., wheelchair, bedside chair)? 2  -LL -- 2  -KH    Climbing 3-5 steps with a railing? 2  -LL -- 2  -KH    To walk in hospital room? 2  -LL -- 2  -KH    AM-PAC 6 Clicks Score (PT) 13  -LL -- 12  -KH       How much help from another is currently needed...    Putting on and taking off regular lower body clothing? 2  -LA 2  -KR --    Bathing (including washing, rinsing, and drying) 2  -LA 2  -KR --    Toileting (which includes using toilet bed pan or urinal) 2  -LA 2  -KR --    Putting on and taking off regular upper body clothing 3  -LA 3  -KR --    Taking care of personal grooming (such as brushing teeth) 3  -LA 3  -KR --    Eating meals 4  -LA 4  -KR --    AM-PAC 6 Clicks Score (OT) 16  -LA 16  -KR --       Functional Assessment    Outcome Measure Options AM-PAC 6 Clicks Daily Activity (OT)  -LA -- AM-PAC 6 Clicks Basic Mobility (PT)  -    Row Name 11/02/22 1345             How much help from another is currently needed...    Putting on and taking off regular lower body clothing? 2  -KR      Bathing (including washing, rinsing, and drying) 2  -KR      Toileting (which includes using toilet bed pan or urinal) 2  -KR      Putting on and taking off regular upper body clothing 3  -KR      Taking care of personal grooming (such as brushing teeth) 3  -KR      Eating meals 4  -KR      AM-PAC 6 Clicks Score (OT) 16  -KR            User Key  (r) = Recorded By, (t) = Taken By, (c) = Cosigned By    Initials Name Provider Type    Andres Ayala, OT Occupational Therapist    Neda Kyle, PTA Physical Therapist Assistant    Radha Yusuf, PT Physical Therapist    Dianna Barreto, OT Occupational Therapist                 Time Calculation:    PT Charges     Row Name 11/05/22 1326             Time Calculation    Start Time 1215  -LL      Stop Time 1240  -LL      Time Calculation (min) 25 min  -LL      PT Received On  22  -LL         Time Calculation- PT    Total Timed Code Minutes- PT 25 minute(s)  -LL            User Key  (r) = Recorded By, (t) = Taken By, (c) = Cosigned By    Initials Name Provider Type    LL Neda Garcia PTA Physical Therapist Assistant              Therapy Charges for Today     Code Description Service Date Service Provider Modifiers Qty    00369390499 HC PT THERAPEUTIC ACT EA 15 MIN 2022 Neda Garcia PTA GP, CQ 1    40542197979 HC PT THER PROC EA 15 MIN 2022 Neda Garcia PTA GP, CQ 1          PT G-Codes  Outcome Measure Options: AM-PAC 6 Clicks Daily Activity (OT)  AM-PAC 6 Clicks Score (PT): 13  AM-PAC 6 Clicks Score (OT): 16    Neda. NACHO Garcia  2022      Electronically signed by Neda Garcia PTA at 22 1336          Occupational Therapy Notes (most recent note)      Dianna Siddiqi, OT at 22 1311          Acute Care - Occupational Therapy Treatment Note   [place]     Patient Name: Tre Kinney  : 1932  MRN: [id number]  Today's Date: 2022  Onset of Illness/Injury or Date of Surgery: 10/06/22     Referring Physician: Janneth    Admit Date: 10/5/2022     No diagnosis found.  Patient Active Problem List   Diagnosis   • Acid reflux   • Ankle arthralgia   • Cardiac conduction disorder   • Closed fracture of distal fibula   • Arteriosclerosis of coronary artery   • Fracture of distal end of tibia   • Elevated cholesterol   • BP (high blood pressure)   • Hypertrophic polyarthritis   • OP (osteoporosis)   • Right heart failure (HCC)   • Heart failure, chronic, right-sided (HCC)   • SOB (shortness of breath)   • Palpitations   • CHF (congestive heart failure) (HCC)   • Congestive heart failure (HCC)   • Precordial pain   • Chronic anticoagulation   • Hypokalemia   • Anemia, unspecified type   • Generalized weakness     Past Medical History:   Diagnosis Date   • Anemia    • Arthritis    • CHF (congestive heart failure) (HCC)    • Chronic kidney  disease    • Coronary artery disease    • H/O blood clots    • History of transfusion     no reaction    • Hypertension      Past Surgical History:   Procedure Laterality Date   • ABDOMINAL SURGERY     • APPENDECTOMY     • CARDIAC SURGERY     •  SECTION     • COLONOSCOPY N/A 3/7/2018    Procedure: COLONOSCOPY;  Surgeon: Willie Gresham MD;  Location:  [facility];  Service:    • CORONARY ANGIOPLASTY WITH STENT PLACEMENT     • ENDOSCOPY N/A 3/6/2018    Procedure: ESOPHAGOGASTRODUODENOSCOPY;  Surgeon: Willie Gresham MD;  Location:  [facility];  Service:    • PACEMAKER IMPLANTATION      left side    • SKIN BIOPSY     • TUBAL ABDOMINAL LIGATION           OT ASSESSMENT FLOWSHEET (last 12 hours)     OT Evaluation and Treatment     Row Name 22 1259                   OT Time and Intention    Subjective Information no complaints  -LA        Mode of Treatment occupational therapy  -LA        Patient Effort good  -LA        Symptoms Noted During/After Treatment none  -LA           General Information    Patient Profile Reviewed yes  -LA        Patient/Family/Caregiver Comments/Observations Patient agreeable to treatment this date. Patient on phone with son, Valdez whom wanted us to Facetime other brother Willian. Attempted FaceTime with Willian after speaking with nurse for IPAD. Unable to reach Willian via Facetime. Patient continued to be agreeable with therapy and pleasant and cooperative throughout session. OT/PT present 11:50- 12:40.  -LA        Existing Precautions/Restrictions fall  -LA           Cognition    Affect/Mental Status (Cognition) WFL  -LA        Orientation Status (Cognition) oriented x 4  -LA        Follows Commands (Cognition) WFL  -LA           Self-Feeding Assessment/Training    [place] Level (Feeding) feeding skills;set up  -LA        Position (Self-Feeding) supported sitting  Patient moved from bed to chair for meal this date.  -LA           Bed Mobility    Supine-Sit [place]  (Bed Mobility) moderate assist (50% patient effort);maximum assist (25% patient effort);1 person assist  -LA           Bed-Chair Transfer    Bed-Chair [place] (Transfers) moderate assist (50% patient effort);maximum assist (25% patient effort);2 person assist  -LA        Comment, (Bed-Chair Transfer) cues for posture and safety. Patient continues to attempt to sit before its safe. Cues to back to chair until legs touch on the back of both legs then reach back and control sit.  -LA           Sit-Stand Transfer    Sit-Stand [place] (Transfers) moderate assist (50% patient effort);maximum assist (25% patient effort)  -LA           Motor Skills    Motor Skills coordination;functional endurance  -LA        Therapeutic Exercise shoulder;elbow/forearm;wrist;hand  -LA        Additional Documentation --  AROM and UE exercises with resistance band (red) completed in all planes. patient with noted improvement in strength.  -LA           Balance    Static Sitting Balance verbal cues  Patient continues to lean to right side however able to correct with verbal cues.  -LA           Plan of Care Review    Plan of Care Reviewed With patient  -LA           Positioning and Restraints    Pre-Treatment Position in bed  -LA        Post Treatment Position chair  -LA        In Chair call light within reach;encouraged to call for assist;exit alarm on  Patient agreeable to stay up in chair at end of session this date. Patient did report she was up earlier this date as well. Patient reported sons Valdez and Leno present earlier this date for visit.  -LA              User Key  (r) = Recorded By, (t) = Taken By, (c) = Cosigned By    Initials Name Effective Dates    Dianna Barreto, VELASQUEZ 02/14/22 -                        OT Recommendation and Plan  Planned Therapy Interventions (OT): activity tolerance training, patient/caregiver education/training, adaptive equipment training, ROM/therapeutic exercise, occupation/activity based  interventions, BADL retraining, strengthening exercise, transfer/mobility retraining, functional balance retraining  Therapy Frequency (OT): 6 times/wk  Plan of Care Review  Plan of Care Reviewed With: patient  Progress: improving  Outcome Evaluation: OT evaluation completed this date. Patient engaged in UE tasks with focus on trunk control and fx reaching to promote increased independence with ADLs. Patient has had a significant decline in independence and safety with ADLs resulting in need for skilled OT services. OT to address deficits to allow patient to max level of independence prior to d/c. Patient admitted for swing bed and OT discussed activities. Patient given word search puzzles to promote increase activity. patient also reports she watches television. Family also stated they could bring in books for entertainment as needed.  Plan of Care Reviewed With: patient  Outcome Evaluation: OT evaluation completed this date. Patient engaged in UE tasks with focus on trunk control and fx reaching to promote increased independence with ADLs. Patient has had a significant decline in independence and safety with ADLs resulting in need for skilled OT services. OT to address deficits to allow patient to max level of independence prior to d/c. Patient admitted for swing bed and OT discussed activities. Patient given word search puzzles to promote increase activity. patient also reports she watches television. Family also stated they could bring in books for entertainment as needed.     Outcome Measures     Row Name 11/05/22 1300 11/04/22 1345 11/03/22 1310       How much help from another person do you currently need...    Turning from your back to your side while in flat bed without using bedrails? -- -- 2  -KH    Moving from lying on back to sitting on the side of a flat bed without bedrails? -- -- 2  -KH    Moving to and from a bed to a chair (including a wheelchair)? -- -- 2  -KH    Standing up from a chair using  your arms (e.g., wheelchair, bedside chair)? -- -- 2  -KH    Climbing 3-5 steps with a railing? -- -- 2  -KH    To walk in hospital room? -- -- 2  -KH    AM-PAC 6 Clicks Score (PT) -- -- 12  -KH       How much help from another is currently needed...    Putting on and taking off regular lower body clothing? 2  -LA 2  -KR --    Bathing (including washing, rinsing, and drying) 2  -LA 2  -KR --    Toileting (which includes using toilet bed pan or urinal) 2  -LA 2  -KR --    Putting on and taking off regular upper body clothing 3  -LA 3  -KR --    Taking care of personal grooming (such as brushing teeth) 3  -LA 3  -KR --    Eating meals 4  -LA 4  -KR --    AM-PAC 6 Clicks Score (OT) 16  -LA 16  -KR --       Functional Assessment    Outcome Measure Options AM-PAC 6 Clicks Daily Activity (OT)  -LA -- AM-PAC 6 Clicks Basic Mobility (PT)  -    Row Name 11/02/22 1345             How much help from another is currently needed...    Putting on and taking off regular lower body clothing? 2  -KR      Bathing (including washing, rinsing, and drying) 2  -KR      Toileting (which includes using toilet bed pan or urinal) 2  -KR      Putting on and taking off regular upper body clothing 3  -KR      Taking care of personal grooming (such as brushing teeth) 3  -KR      Eating meals 4  -KR      AM-PAC 6 Clicks Score (OT) 16  -KR            User Key  (r) = Recorded By, (t) = Taken By, (c) = Cosigned By    Initials Name Provider Type    Andres Ayala, OT Occupational Therapist    Radha Yusuf, PT Physical Therapist    Dianna Barreto, OT Occupational Therapist                Time Calculation:    Time Calculation- OT     Row Name 11/05/22 1310             Time Calculation- OT    OT Start Time 1150  -LA      OT Stop Time 1215  -LA      OT Time Calculation (min) 25 min  -LA      Total Timed Code Minutes- OT 25 minute(s)  -LA            User Key  (r) = Recorded By, (t) = Taken By, (c) = Cosigned By    Initials Name Provider Type     Dianna Barreto OT Occupational Therapist              Therapy Charges for Today     Code Description Service Date Service Provider Modifiers Qty    78783253867 HC OT SELF CARE/MGMT/TRAIN EA 15 MIN 2022 Dianna Siddiqi OT GO 1    16190306846 HC OT THER PROC EA 15 MIN 2022 Dianna Siddiqi OT GO 1               Dianna Siddiqi OT  2022    Electronically signed by Dianna Siddiqi OT at 22 1312     Kennedi Saucedo MD   Physician  Hospitalist  Progress Notes     Signed  Date of Service:  10/31/22 1659  Creation Time:  10/31/22 1659     Signed           [facility] HOSPITALIST PROGRESS NOTE     Patient Identification:  Name:  Tre Kinney  Age:  89 y.o.  Sex:  female  :  1932  MRN:  [id number]  Visit Number:  [id number]  ROOM: [address] [place] [address]      Primary Care Provider:  Jessica Pedro APRN     Length of stay in inpatient status:  26     Subjective      Chief Compliant:  No chief complaint on file.        History of Presenting Illness:       Pt is doing well, no acute complaints, no acute events. Is still needing a lot of assistance w/ transfers, but is working with PT. Patient is very pleasant, and has no further questions.     Staff report that family have a lot of concerns with the care she is getting at this facility, but pt appears to be grossly decisional. SW requested a psych consult to verify that the pt has decision making capacity in order to help us make decisions on who ultimately needs information on the pt's course, and who can make decisions about the pt's care.     Objective      Current Hospital Meds:  apixaban, 5 mg, Oral, Q12H  docusate sodium, 100 mg, Oral, BID  escitalopram, 10 mg, Oral, Daily  furosemide, 40 mg, Oral, BID  gabapentin, 300 mg, Oral, Nightly  hydrALAZINE, 5 mg, Oral, Q8H  melatonin, 10 mg, Oral, Nightly  metoprolol tartrate, 12.5 mg, Oral, Q12H  multivitamin, 1 tablet, Oral, Daily  pantoprazole, 40 mg, Oral, Q AM  sodium chloride, 10 mL,  Intravenous, Q12H  sodium chloride, 10 mL, Intravenous, Q12H  tamsulosin, 0.4 mg, Oral, Daily        Pharmacy Consult,         ----------------------------------------------------------------------------------------------------------------------  Vital Signs:  Temp:  [98 °F (36.7 °C)-98.2 °F (36.8 °C)] 98 °F (36.7 °C)  Heart Rate:  [75-98] 80  Resp:  [18] 18  BP: (130-162)/() 130/80  SpO2:  [94 %-96 %] 94 %  on   ;   Device (Oxygen Therapy): room air  Body mass index is 24.74 kg/m².        Intake/Output Summary (Last 24 hours) at 10/31/2022 1659  Last data filed at 10/31/2022 1100      Gross per 24 hour   Intake 1560 ml   Output 200 ml   Net 1360 ml      ----------------------------------------------------------------------------------------------------------------------  Physical exam:  Physical Exam  Vitals and nursing note reviewed.   Constitutional:       General: She is not in acute distress.     Appearance: Normal appearance. She is not ill-appearing, toxic-appearing or diaphoretic.   Eyes:      Extraocular Movements: Extraocular movements intact.      Pupils: Pupils are equal, round, and reactive to light.   Cardiovascular:      Rate and Rhythm: Normal rate and regular rhythm.      Heart sounds: Normal heart sounds.   Pulmonary:      Effort: Pulmonary effort is normal. No respiratory distress.      Breath sounds: No wheezing, rhonchi or rales.   Abdominal:      General: Bowel sounds are normal. There is no distension.      Palpations: Abdomen is soft.      Tenderness: There is no abdominal tenderness. There is no guarding.   Musculoskeletal:      Right lower leg: No edema.      Left lower leg: No edema.   Skin:     General: Skin is warm and dry.   Neurological:      General: No focal deficit present.      Mental Status: She is alert and oriented to person, place, and time. Mental status is at baseline.   Psychiatric:         Mood and Affect: Mood normal.         Behavior: Behavior normal.          ----------------------------------------------------------------------------------------------------------------------  WBC/HGB/HCT/PLT   6.90/9.4/30.1/229 (10/31 0259)  BUN/CREAT/GLUC/ALT/AST/MARIO ALBERTO/LIP    24/1.69/104/<5/10/--/-- (10/31 0259)  LYTES - Na/K/Cl/CO2: 141/3.6/103/26.4 (10/31 0259)  No results found for: URINECX  No results found for: BLOODCX     I have personally looked at the labs and they are summarized above.  ----------------------------------------------------------------------------------------------------------------------  Detailed radiology reports for the last 24 hours:  No radiology results for the last day     Assessment & Plan       Assessment & Plan      Generalized weakness/debility  Diffuse LLE DVT  DIANA on CKD IV  Acute blood loss anemia  Traumatic right gluteal hematoma  Mild hypokalemia   Paroxysmal Afib  Hx of SSS s/p pacemaker placement  Essential HTN  Reported hx of DVT's   Anxiety/depression  Advanced age     - Continue PT/OT  - Psych eval to establish decision making capacity formally     Disposition: Plan currently is still home w/ home health when able, still mod/max assist for function transfers w/ rolling walker, requiring frequent cueing     Kennedi Saucedo MD  10/31/22  16:59 EDT                                                                                                                       Discharge Planning Assessment  CODY Ellison     Patient Name: Tre Kinney                    MRN: [id number]  Today's Date: 11/4/2022                     Admit Date: 10/5/2022     Plan: SS spoke with pt at bedside on this date 11/4/22. Pt was pleasent when speaking with her today. Pt states she has not received an update from insurance on this date. SS to follow.          Discharge Plan            Row Name 11/04/22 1646             Plan      Plan SS spoke with pt at bedside on this date 11/4/22. Pt was pleasent when speaking with her today. Pt states she has not received an  update from insurance on this date. SS to follow.                        XANDER Craig

## 2022-11-08 LAB
ANION GAP SERPL CALCULATED.3IONS-SCNC: 10.8 MMOL/L (ref 5–15)
BASOPHILS # BLD AUTO: 0.07 10*3/MM3 (ref 0–0.2)
BASOPHILS NFR BLD AUTO: 1.1 % (ref 0–1.5)
BUN SERPL-MCNC: 29 MG/DL (ref 8–23)
BUN/CREAT SERPL: 16.7 (ref 7–25)
CALCIUM SPEC-SCNC: 8.8 MG/DL (ref 8.6–10.5)
CHLORIDE SERPL-SCNC: 100 MMOL/L (ref 98–107)
CO2 SERPL-SCNC: 27.2 MMOL/L (ref 22–29)
CREAT SERPL-MCNC: 1.74 MG/DL (ref 0.57–1)
DEPRECATED RDW RBC AUTO: 53.1 FL (ref 37–54)
EGFRCR SERPLBLD CKD-EPI 2021: 27.8 ML/MIN/1.73
EOSINOPHIL # BLD AUTO: 0.58 10*3/MM3 (ref 0–0.4)
EOSINOPHIL NFR BLD AUTO: 8.7 % (ref 0.3–6.2)
ERYTHROCYTE [DISTWIDTH] IN BLOOD BY AUTOMATED COUNT: 15.1 % (ref 12.3–15.4)
GLUCOSE SERPL-MCNC: 93 MG/DL (ref 65–99)
HCT VFR BLD AUTO: 30.2 % (ref 34–46.6)
HGB BLD-MCNC: 9.6 G/DL (ref 12–15.9)
IMM GRANULOCYTES # BLD AUTO: 0.02 10*3/MM3 (ref 0–0.05)
IMM GRANULOCYTES NFR BLD AUTO: 0.3 % (ref 0–0.5)
LYMPHOCYTES # BLD AUTO: 1.23 10*3/MM3 (ref 0.7–3.1)
LYMPHOCYTES NFR BLD AUTO: 18.5 % (ref 19.6–45.3)
MCH RBC QN AUTO: 30.2 PG (ref 26.6–33)
MCHC RBC AUTO-ENTMCNC: 31.8 G/DL (ref 31.5–35.7)
MCV RBC AUTO: 95 FL (ref 79–97)
MONOCYTES # BLD AUTO: 0.47 10*3/MM3 (ref 0.1–0.9)
MONOCYTES NFR BLD AUTO: 7.1 % (ref 5–12)
NEUTROPHILS NFR BLD AUTO: 4.27 10*3/MM3 (ref 1.7–7)
NEUTROPHILS NFR BLD AUTO: 64.3 % (ref 42.7–76)
NRBC BLD AUTO-RTO: 0 /100 WBC (ref 0–0.2)
PLATELET # BLD AUTO: 227 10*3/MM3 (ref 140–450)
PMV BLD AUTO: 9.7 FL (ref 6–12)
POTASSIUM SERPL-SCNC: 3.6 MMOL/L (ref 3.5–5.2)
RBC # BLD AUTO: 3.18 10*6/MM3 (ref 3.77–5.28)
SODIUM SERPL-SCNC: 138 MMOL/L (ref 136–145)
WBC NRBC COR # BLD: 6.64 10*3/MM3 (ref 3.4–10.8)

## 2022-11-08 PROCEDURE — 85025 COMPLETE CBC W/AUTO DIFF WBC: CPT | Performed by: INTERNAL MEDICINE

## 2022-11-08 PROCEDURE — 97530 THERAPEUTIC ACTIVITIES: CPT

## 2022-11-08 PROCEDURE — 80048 BASIC METABOLIC PNL TOTAL CA: CPT | Performed by: INTERNAL MEDICINE

## 2022-11-08 RX ORDER — POTASSIUM CHLORIDE 20 MEQ/1
40 TABLET, EXTENDED RELEASE ORAL EVERY 4 HOURS
Status: DISPENSED | OUTPATIENT
Start: 2022-11-08 | End: 2022-11-08

## 2022-11-08 RX ADMIN — FUROSEMIDE 40 MG: 40 TABLET ORAL at 18:10

## 2022-11-08 RX ADMIN — ESCITALOPRAM 10 MG: 10 TABLET, FILM COATED ORAL at 08:56

## 2022-11-08 RX ADMIN — Medication 10 ML: at 09:18

## 2022-11-08 RX ADMIN — HYDRALAZINE HYDROCHLORIDE 5 MG: 25 TABLET, FILM COATED ORAL at 21:22

## 2022-11-08 RX ADMIN — HYDRALAZINE HYDROCHLORIDE 5 MG: 25 TABLET, FILM COATED ORAL at 05:42

## 2022-11-08 RX ADMIN — APIXABAN 5 MG: 5 TABLET, FILM COATED ORAL at 08:56

## 2022-11-08 RX ADMIN — METOPROLOL TARTRATE 12.5 MG: 25 TABLET, FILM COATED ORAL at 08:56

## 2022-11-08 RX ADMIN — Medication 10 ML: at 21:23

## 2022-11-08 RX ADMIN — PANTOPRAZOLE SODIUM 40 MG: 40 TABLET, DELAYED RELEASE ORAL at 05:42

## 2022-11-08 RX ADMIN — METOPROLOL TARTRATE 12.5 MG: 25 TABLET, FILM COATED ORAL at 21:22

## 2022-11-08 RX ADMIN — TAMSULOSIN HYDROCHLORIDE 0.4 MG: 0.4 CAPSULE ORAL at 08:56

## 2022-11-08 RX ADMIN — Medication 1 TABLET: at 08:57

## 2022-11-08 RX ADMIN — APIXABAN 5 MG: 5 TABLET, FILM COATED ORAL at 21:22

## 2022-11-08 RX ADMIN — POTASSIUM CHLORIDE 40 MEQ: 20 TABLET, EXTENDED RELEASE ORAL at 09:01

## 2022-11-08 RX ADMIN — Medication 10 MG: at 21:22

## 2022-11-08 RX ADMIN — FUROSEMIDE 40 MG: 40 TABLET ORAL at 08:56

## 2022-11-08 RX ADMIN — GABAPENTIN 300 MG: 300 CAPSULE ORAL at 21:22

## 2022-11-08 RX ADMIN — HYDRALAZINE HYDROCHLORIDE 5 MG: 25 TABLET, FILM COATED ORAL at 14:53

## 2022-11-08 NOTE — THERAPY TREATMENT NOTE
Acute Care - Occupational Therapy Treatment Note   [place]     Patient Name: Tre Kinney  : 1932  MRN: [id number]  Today's Date: 2022  Onset of Illness/Injury or Date of Surgery: 10/06/22     Referring Physician: Janneth    Admit Date: 10/5/2022     No diagnosis found.  Patient Active Problem List   Diagnosis   • Acid reflux   • Ankle arthralgia   • Cardiac conduction disorder   • Closed fracture of distal fibula   • Arteriosclerosis of coronary artery   • Fracture of distal end of tibia   • Elevated cholesterol   • BP (high blood pressure)   • Hypertrophic polyarthritis   • OP (osteoporosis)   • Right heart failure (HCC)   • Heart failure, chronic, right-sided (HCC)   • SOB (shortness of breath)   • Palpitations   • CHF (congestive heart failure) (HCC)   • Congestive heart failure (HCC)   • Precordial pain   • Chronic anticoagulation   • Hypokalemia   • Anemia, unspecified type   • Generalized weakness     Past Medical History:   Diagnosis Date   • Anemia    • Arthritis    • CHF (congestive heart failure) ([facility])    • Chronic kidney disease    • Coronary artery disease    • H/O blood clots    • History of transfusion     no reaction    • Hypertension      Past Surgical History:   Procedure Laterality Date   • ABDOMINAL SURGERY     • APPENDECTOMY     • CARDIAC SURGERY     •  SECTION     • COLONOSCOPY N/A 3/7/2018    Procedure: COLONOSCOPY;  Surgeon: Willie Gresham MD;  Location:  [facility];  Service:    • CORONARY ANGIOPLASTY WITH STENT PLACEMENT     • ENDOSCOPY N/A 3/6/2018    Procedure: ESOPHAGOGASTRODUODENOSCOPY;  Surgeon: Willie Gresham MD;  Location:  [facility];  Service:    • PACEMAKER IMPLANTATION      left side    • SKIN BIOPSY     • TUBAL ABDOMINAL LIGATION           OT ASSESSMENT FLOWSHEET (last 12 hours)     OT Evaluation and Treatment     Row Name 22 1458                   OT Time and Intention    Subjective Information complains of;fatigue  -KP        Document  Type therapy note (daily note)  -KP        Mode of Treatment individual therapy;occupational therapy  -KP        Total Minutes, Occupational Therapy 945  945 to 1015  -KP        Patient Effort adequate  -KP        Symptoms Noted During/After Treatment none  -KP        Comment Patient seen by OT at 945 with PT to assist for transfers. Patient attempted to stand X1 trial but returned sitting quickly and voiced she unable and tired. She expressed general defeat with current functional performance. Encouragement by therapist and son (Willian on Facetime). She continued to decline further intervention.  Attempted to see patient again in afternoon with patient declining. On initial session, therapist attempted to promote environmental supports for patient with opening blinds and providing leisure activity within room.  -           General Information    Patient Profile Reviewed yes  -        Existing Precautions/Restrictions fall;other (see comments);TLSO  -        Limitations/Impairments safety/cognitive  -KP        Equipment Issued to Patient gait belt  -KP        Risks Reviewed patient:  -KP        Benefits Reviewed patient:;family:  -KP        Barriers to Rehab none identified  -KP           Pain Assessment    Pretreatment Pain Rating 0/10 - no pain  -KP        Posttreatment Pain Rating 0/10 - no pain  -KP           Cognition    Affect/Mental Status (Cognition) sad/depressed affect  -KP        Orientation Status (Cognition) oriented x 4  -KP        Follows Commands (Cognition) WFL  -        Personal Safety Interventions fall prevention program maintained  -KP           Activities of Daily Living    Additional Documentation Comment, IADL Assessment/Training (Row)  Patient given leisure activity sitting up in recliner upon departure and patient expressed excitement. Upon returning later in date, she stated she did not complete task once therapist departed with no reasoning for changing mood towards task.  -KP            Sit-Stand Transfer    Sit-Stand [place] (Transfers) moderate assist (50% patient effort);maximum assist (25% patient effort);2 person assist  -KP        Assistive Device (Sit-Stand Transfers) walker, front-wheeled  -KP           Stand-Sit Transfer    Stand-Sit [place] (Transfers) moderate assist (50% patient effort);maximum assist (25% patient effort);2 person assist  -KP        Assistive Device (Stand-Sit Transfers) walker, front-wheeled  -KP           Motor Skills    Functional Endurance poor  -KP           Positioning and Restraints    Pre-Treatment Position sitting in chair/recliner  -KP        Post Treatment Position chair  -KP        In Chair sitting;call light within reach  -KP           Progress Summary (OT)    Progress Toward Functional Goals (OT) progress toward functional goals is gradual;unable to show any progress toward functional goals  -KP              User Key  (r) = Recorded By, (t) = Taken By, (c) = Cosigned By    Initials Name Effective Dates    Linn Richardson, OT 06/16/21 -                        OT Recommendation and Plan     Progress Toward Functional Goals (OT): progress toward functional goals is gradual, unable to show any progress toward functional goals  Plan of Care Review  Plan of Care Reviewed With: patient, son  Progress: improving  Outcome Evaluation: Patient seen for OT treatment session. She was assisted from bed to chair using rolling walker with moderate/maximal assist. Fair tolerance for seated UE exercises on this date. Patient with improved alertness and socialization with therapist on this date, overall endurance improved on this date with patient seen before lunch versus afternoon. Continue plan of care.  Plan of Care Reviewed With: patient, son  Outcome Evaluation: Patient seen for OT treatment session. She was assisted from bed to chair using rolling walker with moderate/maximal assist. Fair tolerance for seated UE exercises on this date. Patient with  improved alertness and socialization with therapist on this date, overall endurance improved on this date with patient seen before lunch versus afternoon. Continue plan of care.     Outcome Measures     Row Name 11/08/22 1509             How much help from another is currently needed...    Putting on and taking off regular lower body clothing? 2  -KP      Bathing (including washing, rinsing, and drying) 2  -KP      Toileting (which includes using toilet bed pan or urinal) 2  -KP      Putting on and taking off regular upper body clothing 3  -KP      Taking care of personal grooming (such as brushing teeth) 3  -KP      Eating meals 4  -KP      AM-PAC 6 Clicks Score (OT) 16  -KP            User Key  (r) = Recorded By, (t) = Taken By, (c) = Cosigned By    Initials Name Provider Type    Linn Richardson OT Occupational Therapist                Time Calculation:    Time Calculation- OT     Row Name 11/08/22 1509             Time Calculation- OT    OT Start Time 0945  -KP      Total Timed Code Minutes- OT 15 minute(s)  -KP            User Key  (r) = Recorded By, (t) = Taken By, (c) = Cosigned By    Initials Name Provider Type    Linn Richardson OT Occupational Therapist              Therapy Charges for Today     Code Description Service Date Service Provider Modifiers Qty    07269427566 HC OT THER PROC EA 15 MIN 11/7/2022 Linn Quevedo OT GO 1    71135350810 HC OT THERAPEUTIC ACT EA 15 MIN 11/8/2022 Linn Quevedo OT GO 1               Linn Quevedo OT  11/8/2022

## 2022-11-08 NOTE — PLAN OF CARE
Goal Outcome Evaluation:  Plan of Care Reviewed With: patient           Outcome Evaluation: patient is resting in bed, up to chair today. no acute changes or complaints

## 2022-11-08 NOTE — PROGRESS NOTES
Pt admitted to swing bed on 10/5 for additional therapy in the setting of generalized weakness/debility. Pt not seen or examined today. I have reviewed her VS and labs. I have discussed her care with SONAM Gagnon who does not report any acute events or issues that need to be addressed. RN reports that patient remains weak  BP stable today. Cont current antihypertensive regimen. Cont Eliquis for treatment of DVT.   Pt's family's grievance with insurance company currently pending. Cont PT/OT.       Mustapha Loredo,   11/08/22  15:52 EDT

## 2022-11-08 NOTE — THERAPY TREATMENT NOTE
Acute Care - Physical Therapy Treatment Note   [facility]     Patient Name: Tre Kinney  : 1932  MRN: [id number]  Today's Date: 2022   Onset of Illness/Injury or Date of Surgery: 10/06/22  Visit Dx:   No diagnosis found.  Patient Active Problem List   Diagnosis   • Acid reflux   • Ankle arthralgia   • Cardiac conduction disorder   • Closed fracture of distal fibula   • Arteriosclerosis of coronary artery   • Fracture of distal end of tibia   • Elevated cholesterol   • BP (high blood pressure)   • Hypertrophic polyarthritis   • OP (osteoporosis)   • Right heart failure (HCC)   • Heart failure, chronic, right-sided (HCC)   • SOB (shortness of breath)   • Palpitations   • CHF (congestive heart failure) (HCC)   • Congestive heart failure (HCC)   • Precordial pain   • Chronic anticoagulation   • Hypokalemia   • Anemia, unspecified type   • Generalized weakness     Past Medical History:   Diagnosis Date   • Anemia    • Arthritis    • CHF (congestive heart failure) (HCC)    • Chronic kidney disease    • Coronary artery disease    • H/O blood clots    • History of transfusion     no reaction    • Hypertension      Past Surgical History:   Procedure Laterality Date   • ABDOMINAL SURGERY     • APPENDECTOMY     • CARDIAC SURGERY     •  SECTION     • COLONOSCOPY N/A 3/7/2018    Procedure: COLONOSCOPY;  Surgeon: Willie Gresham MD;  Location:  [facility];  Service:    • CORONARY ANGIOPLASTY WITH STENT PLACEMENT     • ENDOSCOPY N/A 3/6/2018    Procedure: ESOPHAGOGASTRODUODENOSCOPY;  Surgeon: Willie Gresham MD;  Location:  [facility];  Service:    • PACEMAKER IMPLANTATION      left side    • SKIN BIOPSY     • TUBAL ABDOMINAL LIGATION       PT Assessment (last 12 hours)     PT Evaluation and Treatment     Row Name 22 1045          Physical Therapy Time and Intention    Subjective Information complains of;fatigue  -CT     Document Type therapy note (daily note)  -CT     Mode of Treatment  "physical therapy  -CT     Patient Effort adequate  -CT     Comment Pt treatment session in AM from [id number] with Son Willian via [employer]. OT also in room to assist with transfer. Pt stood with therapy but declined to attempt to take steps. Family and therapist encouraged pt to participate with therapy and pt repeatedly voiced thats he was \"tired\". Attempted to check back on pt in PM at 1410 but pt continued to decline therapy services at that time.  -CT     Row Name 11/08/22 1045          General Information    Patient Profile Reviewed yes  -CT     Existing Precautions/Restrictions fall;other (see comments);TLSO  swan; pt declines use of TLSO  -CT     Row Name 11/08/22 1045          Cognition    Affect/Mental Status (Cognition) WFL  -CT     Orientation Status (Cognition) oriented x 4  -CT     Follows Commands (Cognition) WFL  -CT     Row Name 11/08/22 1045          Bed Mobility    Sit-Supine [place] (Bed Mobility) standby assist;verbal cues  -CT     Assistive Device (Bed Mobility) bed rails;draw sheet  -CT     Row Name 11/08/22 1045          Sit-Stand Transfer    Sit-Stand [place] (Transfers) moderate assist (50% patient effort);verbal cues;nonverbal cues (demo/gesture);maximum assist (25% patient effort)  -CT     Assistive Device (Sit-Stand Transfers) walker, front-wheeled  -CT     Row Name 11/08/22 1045          Stand-Sit Transfer    Stand-Sit [place] (Transfers) moderate assist (50% patient effort);maximum assist (25% patient effort);verbal cues;nonverbal cues (demo/gesture)  -CT     Assistive Device (Stand-Sit Transfers) walker, front-wheeled  -CT     Row Name 11/08/22 1045          Gait/Stairs (Locomotion)    Comment, (Gait/Stairs) pt declined to take steps once standing  -CT     Row Name 11/08/22 1045          Coping    Observed Emotional State calm;cooperative  -CT     Verbalized Emotional State acceptance  -CT     Row Name 11/08/22 1045          Positioning and Restraints    Pre-Treatment " Position sitting in chair/recliner  -CT     Post Treatment Position chair  -CT     In Chair sitting;call light within reach;encouraged to call for assist;notified nsg  -CT           User Key  (r) = Recorded By, (t) = Taken By, (c) = Cosigned By    Initials Name Provider Type    CT Bhargavi Vásquez, EDEL Physical Therapist                Physical Therapy Education     Title: PT OT SLP Therapies (Done)     Topic: Physical Therapy (Done)     Point: Mobility training (Done)     Learning Progress Summary           Patient Acceptance, E,TB, VU by KK at 11/7/2022 1635    Acceptance, E,D, VU,NR by LL at 11/5/2022 1325    Acceptance, E, VU by EB at 11/4/2022 1448    Acceptance, E,TB, VU by KM at 11/4/2022 1101                   Point: Home exercise program (Done)     Learning Progress Summary           Patient Acceptance, E,TB, VU by KK at 11/7/2022 1635    Acceptance, E,D, VU,NR by  at 11/5/2022 1325    Acceptance, E, VU by EB at 11/4/2022 1448    Acceptance, E,TB, VU by KM at 11/4/2022 1101                   Point: Body mechanics (Done)     Learning Progress Summary           Patient Acceptance, E,TB, VU by KK at 11/7/2022 1635    Acceptance, E,D, VU,NR by LL at 11/5/2022 1325    Acceptance, E, VU by EB at 11/4/2022 1448    Acceptance, E,TB, VU by KM at 11/4/2022 1101                   Point: Precautions (Done)     Learning Progress Summary           Patient Acceptance, E,TB, VU by KK at 11/7/2022 1635    Acceptance, E,D, VU,NR by  at 11/5/2022 1325    Acceptance, E, VU by EB at 11/4/2022 1448    Acceptance, E,TB, VU by KM at 11/4/2022 1101                               User Key     Initials Effective Dates Name Provider Type Discipline    LL 05/02/16 -  Neda Garcia PTA Physical Therapist Assistant PT    KM 05/24/22 -  Mao Vázquez PT Physical Therapist PT    EB 09/22/22 -  Cedric Oliver, RN Registered Nurse Nurse     06/27/22 -  Chelsi Bolivar, RN Registered Nurse Nurse              PT Recommendation and  Plan  Anticipated Discharge Disposition (PT): home with 24/7 care, skilled nursing facility  Planned Therapy Interventions (PT): balance training, bed mobility training, gait training, home exercise program, motor coordination training, manual therapy techniques, neuromuscular re-education, postural re-education, patient/family education, strengthening, transfer training  Therapy Frequency (PT): 5 times/wk (5-6 times/wk)  Progress Summary (PT)  Progress Toward Functional Goals (PT): progress toward functional goals is fair  Plan of Care Reviewed With: patient  Progress: no change       Time Calculation:    PT Charges     Row Name 11/08/22 1431             Time Calculation    Start Time 1045  -CT      PT Received On 11/08/22  -CT         Time Calculation- PT    Total Timed Code Minutes- PT 30 minute(s)  -CT            User Key  (r) = Recorded By, (t) = Taken By, (c) = Cosigned By    Initials Name Provider Type    CT Bhargavi Vásquez, PT Physical Therapist              Therapy Charges for Today     Code Description Service Date Service Provider Modifiers Qty    76844173453 HC GAIT TRAINING EA 15 MIN 11/7/2022 Bhargavi Vásquez, PT GP 1    36665819994 HC PT THER PROC EA 15 MIN 11/7/2022 Bhargavi Vásquez, PT GP 1    85104573190 HC PT THERAPEUTIC ACT EA 15 MIN 11/8/2022 Bhargavi Vásquez, PT GP 1          PT G-Codes  Outcome Measure Options: AM-PAC 6 Clicks Daily Activity (OT)  AM-PAC 6 Clicks Score (PT): 12  AM-PAC 6 Clicks Score (OT): 16    Bhargavi Vásquez PT  11/8/2022

## 2022-11-08 NOTE — CASE MANAGEMENT/SOCIAL WORK
Discharge Planning Assessment  [facility]     Patient Name: Tre Kinney  MRN: [id number]  Today's Date: 11/8/2022    Admit Date: 10/5/2022    Plan: SS spoke with pt at bedside on this date 11/8/22 to discuss disposition. SS discussed disposition options (Home with assistance/caregiver vs. Nursing Home placement). Pt unsure on what disposition will  be at this time. SS to follow and assist with discharge planning.     Discharge Plan     Row Name 11/08/22 1613       Plan    Plan SS spoke with pt at bedside on this date 11/8/22 to discuss disposition. SS discussed disposition options (Home with assistance/caregiver vs. Nursing Home placement). Pt unsure on what disposition will  be at this time. SS to follow and assist with discharge planning.                  XANDER Craig

## 2022-11-09 PROCEDURE — 97116 GAIT TRAINING THERAPY: CPT

## 2022-11-09 PROCEDURE — 97164 PT RE-EVAL EST PLAN CARE: CPT

## 2022-11-09 PROCEDURE — 97110 THERAPEUTIC EXERCISES: CPT

## 2022-11-09 RX ADMIN — FUROSEMIDE 40 MG: 40 TABLET ORAL at 08:46

## 2022-11-09 RX ADMIN — GABAPENTIN 300 MG: 300 CAPSULE ORAL at 21:44

## 2022-11-09 RX ADMIN — Medication 10 MG: at 21:43

## 2022-11-09 RX ADMIN — APIXABAN 5 MG: 5 TABLET, FILM COATED ORAL at 08:46

## 2022-11-09 RX ADMIN — HYDRALAZINE HYDROCHLORIDE 5 MG: 25 TABLET, FILM COATED ORAL at 21:44

## 2022-11-09 RX ADMIN — Medication 10 ML: at 21:44

## 2022-11-09 RX ADMIN — METOPROLOL TARTRATE 12.5 MG: 25 TABLET, FILM COATED ORAL at 08:46

## 2022-11-09 RX ADMIN — HYDRALAZINE HYDROCHLORIDE 5 MG: 25 TABLET, FILM COATED ORAL at 14:42

## 2022-11-09 RX ADMIN — METOPROLOL TARTRATE 12.5 MG: 25 TABLET, FILM COATED ORAL at 21:44

## 2022-11-09 RX ADMIN — Medication 1 TABLET: at 08:46

## 2022-11-09 RX ADMIN — Medication 10 ML: at 08:48

## 2022-11-09 RX ADMIN — FUROSEMIDE 40 MG: 40 TABLET ORAL at 17:28

## 2022-11-09 RX ADMIN — PANTOPRAZOLE SODIUM 40 MG: 40 TABLET, DELAYED RELEASE ORAL at 05:41

## 2022-11-09 RX ADMIN — TAMSULOSIN HYDROCHLORIDE 0.4 MG: 0.4 CAPSULE ORAL at 08:46

## 2022-11-09 RX ADMIN — APIXABAN 5 MG: 5 TABLET, FILM COATED ORAL at 21:44

## 2022-11-09 RX ADMIN — HYDRALAZINE HYDROCHLORIDE 5 MG: 25 TABLET, FILM COATED ORAL at 05:41

## 2022-11-09 RX ADMIN — ESCITALOPRAM 10 MG: 10 TABLET, FILM COATED ORAL at 08:46

## 2022-11-09 NOTE — PROGRESS NOTES
Pt admitted to swing bed on 10/5 for additional therapy in the setting of generalized weakness/debility. Pt not seen or examined today. I have reviewed her VS. I have discussed her care with SONAM rGeen who does not report any acute events or issues that need to be addressed.  BP stable today. Cont current antihypertensive regimen. Cont Eliquis for treatment of DVT. Pt's family's grievance with insurance company currently pending. Discharge disposition unclear at present. Cont PT/OT.       Msutapha Loredo,   11/09/22  15:52 EDT

## 2022-11-09 NOTE — THERAPY TREATMENT NOTE
Acute Care - Occupational Therapy Treatment Note   [facility]     Patient Name: Tre Kinney  : 1932  MRN: [id number]  Today's Date: 2022  Onset of Illness/Injury or Date of Surgery: 10/06/22     Referring Physician: Janneth    Admit Date: 10/5/2022     No diagnosis found.  Patient Active Problem List   Diagnosis   • Acid reflux   • Ankle arthralgia   • Cardiac conduction disorder   • Closed fracture of distal fibula   • Arteriosclerosis of coronary artery   • Fracture of distal end of tibia   • Elevated cholesterol   • BP (high blood pressure)   • Hypertrophic polyarthritis   • OP (osteoporosis)   • Right heart failure (HCC)   • Heart failure, chronic, right-sided (HCC)   • SOB (shortness of breath)   • Palpitations   • CHF (congestive heart failure) (HCC)   • Congestive heart failure (HCC)   • Precordial pain   • Chronic anticoagulation   • Hypokalemia   • Anemia, unspecified type   • Generalized weakness     Past Medical History:   Diagnosis Date   • Anemia    • Arthritis    • CHF (congestive heart failure) ([facility])    • Chronic kidney disease    • Coronary artery disease    • H/O blood clots    • History of transfusion     no reaction    • Hypertension      Past Surgical History:   Procedure Laterality Date   • ABDOMINAL SURGERY     • APPENDECTOMY     • CARDIAC SURGERY     •  SECTION     • COLONOSCOPY N/A 3/7/2018    Procedure: COLONOSCOPY;  Surgeon: Willie Gresham MD;  Location:  [facility];  Service:    • CORONARY ANGIOPLASTY WITH STENT PLACEMENT     • ENDOSCOPY N/A 3/6/2018    Procedure: ESOPHAGOGASTRODUODENOSCOPY;  Surgeon: Willie Gresham MD;  Location:  [facility];  Service:    • PACEMAKER IMPLANTATION      left side    • SKIN BIOPSY     • TUBAL ABDOMINAL LIGATION           OT ASSESSMENT FLOWSHEET (last 12 hours)     OT Evaluation and Treatment     Row Name 22 1440                   OT Time and Intention    Subjective Information no complaints  -        Document Type  therapy note (daily note)  -        Mode of Treatment individual therapy;occupational therapy  -KP        Total Minutes, Occupational Therapy --  1253 - 1325  -KP        Patient Effort good  -KP        Symptoms Noted During/After Treatment none  -KP        Comment Patient seen by OT for treatment session addressing general endurance/activity tolerance and UE strength. OT assisted PT with functional transfer/mobility due to increased assistance required.  Overall improved disposition/motivation on this date from prior. She continues to present with functional performance limitations impacting ability to complete daily tasks at level requiring minimal to no assistance from family. Continue plan of care.  -           General Information    Patient Profile Reviewed yes  -        Existing Precautions/Restrictions fall;other (see comments);TLSO  -        Limitations/Impairments safety/cognitive  -        Equipment Issued to Patient gait belt  -        Comment, General Information Pelon Dorsey on Facetime throughout therapy session.  -           Pain Assessment    Pretreatment Pain Rating 0/10 - no pain  -KP        Posttreatment Pain Rating 0/10 - no pain  -KP           Cognition    Affect/Mental Status (Cognition) WFL  -        Orientation Status (Cognition) oriented x 4  -KP        Follows Commands (Cognition) WFL  -        Personal Safety Interventions nonskid shoes/slippers when out of bed;gait belt;fall prevention program maintained  -           Toileting Assessment/Training    Comment, (Toileting) Patient with BM during transfer, she was able to state she was going but unable to control to not go during functional transfer. Dependent/maximal assist. Encouraged patient to use BSC with nursing staff.  -           Chair-Bed Transfer    Chair-Bed [place] (Transfers) moderate assist (50% patient effort);2 person assist;minimum assist (75% patient effort)  -        Assistive Device (Chair-Bed  Transfers) walker, front-wheeled  -        Comment, (Chair-Bed Transfer) able to complete transfer X 6ft using rolling walker moving on this date; moderate verbal cues  -           Motor Skills    Functional Endurance fair  -        Therapeutic Exercise --  BUE moderate resistance theraband X20 reps X2 sets through functional movement patterns (seated row, shoulder flexion (AROM only), horizontal abd/adduction)  -           Coping    Observed Emotional State calm;pleasant  -KP        Trust Relationship/Rapport care explained;questions answered;empathic listening provided;reassurance provided  -KP           Positioning and Restraints    Pre-Treatment Position sitting in chair/recliner  -KP        Post Treatment Position bed  -KP        In Bed notified nsg;fowlers;call light within reach  -           Progress Summary (OT)    Progress Toward Functional Goals (OT) progress toward functional goals is fair  -        Daily Progress Summary (OT) Patient able to take 6 steps with return to bed on this date. She also improved reps with UE exercise from 15 to 20. Improved overall mental disposition.  -              User Key  (r) = Recorded By, (t) = Taken By, (c) = Cosigned By    Initials Name Effective Dates     Linn Quevedo, OT 06/16/21 -                        OT Recommendation and Plan     Progress Toward Functional Goals (OT): progress toward functional goals is fair  Plan of Care Review  Plan of Care Reviewed With: patient, son  Progress: improving  Outcome Evaluation: Patient seen for OT treatment session. She was assisted from bed to chair using rolling walker with moderate/maximal assist. Fair tolerance for seated UE exercises on this date. Patient with improved alertness and socialization with therapist on this date, overall endurance improved on this date with patient seen before lunch versus afternoon. Continue plan of care.  Plan of Care Reviewed With: patient, son  Outcome Evaluation: Patient  seen for OT treatment session. She was assisted from bed to chair using rolling walker with moderate/maximal assist. Fair tolerance for seated UE exercises on this date. Patient with improved alertness and socialization with therapist on this date, overall endurance improved on this date with patient seen before lunch versus afternoon. Continue plan of care.     Outcome Measures     Row Name 11/08/22 1509             How much help from another is currently needed...    Putting on and taking off regular lower body clothing? 2  -KP      Bathing (including washing, rinsing, and drying) 2  -KP      Toileting (which includes using toilet bed pan or urinal) 2  -KP      Putting on and taking off regular upper body clothing 3  -KP      Taking care of personal grooming (such as brushing teeth) 3  -KP      Eating meals 4  -KP      AM-PAC 6 Clicks Score (OT) 16  -KP            User Key  (r) = Recorded By, (t) = Taken By, (c) = Cosigned By    Initials Name Provider Type    Linn Richardson OT Occupational Therapist                Time Calculation:    Time Calculation- OT     Row Name 11/09/22 1454             Time Calculation- OT    OT Start Time 1253  -KP      OT Stop Time 1325  -KP      OT Time Calculation (min) 32 min  -KP         Timed Charges    70489 - OT Therapeutic Exercise Minutes 16  -KP         Total Minutes    Timed Charges Total Minutes 16  -KP       Total Minutes 16  -KP            User Key  (r) = Recorded By, (t) = Taken By, (c) = Cosigned By    Initials Name Provider Type    Linn Richardson OT Occupational Therapist              Therapy Charges for Today     Code Description Service Date Service Provider Modifiers Qty    95519880319  OT THERAPEUTIC ACT EA 15 MIN 11/8/2022 Linn Quevedo OT GO 1    68732060136 HC OT THER PROC EA 15 MIN 11/9/2022 Linn Quevedo OT GO 1               Linn Quevedo OT  11/9/2022

## 2022-11-09 NOTE — PLAN OF CARE
Goal Outcome Evaluation:              Outcome Evaluation: Patient has appeared to have rested well in bed this shift, patient has [place] [facility] [place] 2 assist, gait belt and walker, no complaints or request at this time, VSS, Will continue plan of care.

## 2022-11-09 NOTE — CASE MANAGEMENT/SOCIAL WORK
Discharge Planning Assessment  [facility]     Patient Name: Tre Kinney  MRN: [id number]  Today's Date: 11/9/2022    Admit Date: 10/5/2022    Plan: SS spoke with pt at bedside on this date 11/9/22 to follow up on discussion about disposition. Pt states she is still unsure what the disposition plan will be at this time. SS Geraldine Tejeda [facility]ing [facility] RnMichelet have been in contact with pt's son Willian about appeal process and forms that need to be filled out. SS to follow.     Discharge Plan     Row Name 11/09/22 1601       Plan    Plan SS spoke with pt at bedside on this date 11/9/22 to follow up on discussion about disposition. Pt states she is still unsure what the disposition plan will be at this time. COSTA Tej[facility]let have been in contact with pt's son Willian about appeal process and forms that need to be filled out. SS to follow.                XANDER Craig

## 2022-11-09 NOTE — PLAN OF CARE
Goal Outcome Evaluation:  Plan of Care Reviewed With: patient           Outcome Evaluation: patient has done well today got out of bed after breakfast was up until 1530 laid back down up to chair for supper was a 2 person assist as well as walker and gait belt has voiced no complaints will continue with plan of care

## 2022-11-09 NOTE — PROGRESS NOTES
Nutrition Services    Patient Name:  Tre Kinney  YOB: 1932  MRN: [id number]  Admit Date:  10/5/2022    Swing bed note: BMI 25.8 Diet - regular soft texture chopped -  thin liquids - Magic cup bid - po averaging 55-60% encourage fluid intake - continue to encourage intake     Electronically signed by:  Ileana Villa RD  11/09/22 14:38 EST

## 2022-11-09 NOTE — CASE MANAGEMENT/SOCIAL WORK
Ileana Mckinley received email from Willian Kinney with no attachment for the expected document.  11/08 08:36 Sent email back stating no attachment also called and left voice mail.            09:21 Received email with attachment .            09:33 Faxed form and info to [employer] [phone] wanda Kamara. Verification received.

## 2022-11-09 NOTE — NURSING NOTE
Went in to get patient up for supper but she said she was to tired to get up again. She did sit up in a chair four hours today

## 2022-11-09 NOTE — THERAPY RE-EVALUATION
Acute Care - Physical Therapy Re-Evaluation   [place]     Patient Name: Tre Kinney  : 1932  MRN: [id number]  Today's Date: 2022   Onset of Illness/Injury or Date of Surgery: 10/06/22  Visit Dx:   No diagnosis found.  Patient Active Problem List   Diagnosis   • Acid reflux   • Ankle arthralgia   • Cardiac conduction disorder   • Closed fracture of distal fibula   • Arteriosclerosis of coronary artery   • Fracture of distal end of tibia   • Elevated cholesterol   • BP (high blood pressure)   • Hypertrophic polyarthritis   • OP (osteoporosis)   • Right heart failure (HCC)   • Heart failure, chronic, right-sided (HCC)   • SOB (shortness of breath)   • Palpitations   • CHF (congestive heart failure) (HCC)   • Congestive heart failure (HCC)   • Precordial pain   • Chronic anticoagulation   • Hypokalemia   • Anemia, unspecified type   • Generalized weakness     Past Medical History:   Diagnosis Date   • Anemia    • Arthritis    • CHF (congestive heart failure) (HCC)    • Chronic kidney disease    • Coronary artery disease    • H/O blood clots    • History of transfusion     no reaction    • Hypertension      Past Surgical History:   Procedure Laterality Date   • ABDOMINAL SURGERY     • APPENDECTOMY     • CARDIAC SURGERY     •  SECTION     • COLONOSCOPY N/A 3/7/2018    Procedure: COLONOSCOPY;  Surgeon: Willie Gresham MD;  Location:  [facility];  Service:    • CORONARY ANGIOPLASTY WITH STENT PLACEMENT     • ENDOSCOPY N/A 3/6/2018    Procedure: ESOPHAGOGASTRODUODENOSCOPY;  Surgeon: Willie Gresham MD;  Location:  [facility];  Service:    • PACEMAKER IMPLANTATION      left side    • SKIN BIOPSY     • TUBAL ABDOMINAL LIGATION       PT Assessment (last 12 hours)     PT Evaluation and Treatment     Row Name 22 1524          Physical Therapy Time and Intention    Subjective Information no complaints  -CT     Document Type re-evaluation;therapy note (daily note)  -CT     Mode of Treatment  physical therapy  -CT     Patient Effort good  -CT     Comment Pt treatment session [id number]. OT present to assist with ambulation and transfer. Pts Son Willian present via adRise. Pt with increase motivation and participation this date. Pt performed sitting ther ex in chair and then ambulate forward and back to bed aprox 7 ft.  -CT     Row Name 11/09/22 1524          General Information    Patient Profile Reviewed yes  -CT     Existing Precautions/Restrictions fall;other (see comments);TLSO  swan; pt declines use of TLSO  -CT     Limitations/Impairments safety/cognitive  -CT     Equipment Issued to Patient gait belt  -CT     Benefits Reviewed patient:  -CT     Row Name 11/09/22 1524          Pain    Pretreatment Pain Rating --  no pain reported this date  -CT     Row Name 11/09/22 1524          Cognition    Affect/Mental Status (Cognition) WFL  -CT     Orientation Status (Cognition) oriented x 4  -CT     Follows Commands (Cognition) WFL  -CT     Personal Safety Interventions fall prevention program maintained;gait belt;muscle strengthening facilitated;nonskid shoes/slippers when out of bed;supervised activity  -CT     Row Name 11/09/22 1524          Range of Motion Comprehensive    Comment, General Range of Motion AROM BLE grossly WFL  -CT     Row Name 11/09/22 1524          Strength Comprehensive (MMT)    Comment, General Manual Muscle Testing (MMT) Assessment BLE grossly 4-/5  -CT     Row Name 11/09/22 1524          Bed Mobility    Sit-Supine [place] (Bed Mobility) standby assist;minimum assist (75% patient effort);verbal cues;nonverbal cues (demo/gesture)  -CT     Bed Mobility, Safety Issues decreased use of legs for bridging/pushing;decreased use of arms for pushing/pulling;impaired trunk control for bed mobility  -CT     Assistive Device (Bed Mobility) bed rails;draw sheet  -CT     Row Name 11/09/22 1524          Transfers    Transfers sit-stand transfer;stand-sit transfer  -CT     Row Name 11/09/22 1524           Chair-Bed Transfer    Chair-Bed [place] (Transfers) minimum assist (75% patient effort);moderate assist (50% patient effort);2 person assist;verbal cues;nonverbal cues (demo/gesture)  -CT     Assistive Device (Chair-Bed Transfers) walker, front-wheeled  -CT     Row Name 11/09/22 1524          Sit-Stand Transfer    Sit-Stand [place] (Transfers) moderate assist (50% patient effort);2 person assist;verbal cues;nonverbal cues (demo/gesture)  -CT     Assistive Device (Sit-Stand Transfers) walker, front-wheeled  -CT     Row Name 11/09/22 1524          Stand-Sit Transfer    Stand-Sit [place] (Transfers) moderate assist (50% patient effort);verbal cues;nonverbal cues (demo/gesture)  -CT     Assistive Device (Stand-Sit Transfers) walker, front-wheeled  -CT     Row Name 11/09/22 1524          Gait/Stairs (Locomotion)    [place] Level (Gait) moderate assist (50% patient effort);2 person assist;verbal cues;nonverbal cues (demo/gesture)  -CT     Assistive Device (Gait) walker, front-wheeled  -CT     Distance in Feet (Gait) 7  -CT     Pattern (Gait) step-to  -CT     Deviations/Abnormal Patterns (Gait) gait speed decreased;festinating/shuffling;weight shifting decreased  -CT     Bilateral Gait Deviations weight shift ability decreased;forward flexed posture;knee buckling bilaterally  -CT     Row Name 11/09/22 1524          Balance    Static Sitting Balance supervision  -CT     Static Standing Balance moderate assist;2-person assist;verbal cues;non-verbal cues (demo/gesture)  -CT     Row Name 11/09/22 1524          Motor Skills    Therapeutic Exercise --  BLE sitting ther ex: LAQ, AP, seated marches, hip abd/add, glut sets  -CT     Row Name 11/09/22 1524          Coping    Observed Emotional State calm;cooperative  -CT     Verbalized Emotional State acceptance  -CT     Trust Relationship/Rapport care explained;questions answered;empathic listening provided;reassurance provided;thoughts/feelings  acknowledged  -CT     Family/Support Persons family  via facetime  -CT     Row Name 11/09/22 1524          Plan of Care Review    Plan of Care Reviewed With patient  -CT     Row Name 11/09/22 1524          Positioning and Restraints    Pre-Treatment Position sitting in chair/recliner  -CT     Post Treatment Position bed  -CT     In Bed supine;call light within reach;encouraged to call for assist;exit alarm on;notified nsg;side rails up x3  -CT     Row Name 11/09/22 1524          Therapy Assessment/Plan (PT)    Functional Level at Time of Evaluation (PT) Mod/Max A x 2 at time of re-eval  -CT     Row Name 11/09/22 1524          Progress Summary (PT)    Progress Toward Functional Goals (PT) progress toward functional goals is gradual  -CT     Row Name 11/09/22 1524          Physical Therapy Goals    Bed Mobility Goal Selection (PT) bed mobility, PT goal 1  -CT     Transfer Goal Selection (PT) transfer, PT goal 1  -CT     Gait Training Goal Selection (PT) gait training, PT goal 1  -CT     Row Name 11/09/22 1524          Bed Mobility Goal 1 (PT)    Activity/Assistive Device (Bed Mobility Goal 1, PT) bed mobility activities, all  -CT     [place] Level/Cues Needed (Bed Mobility Goal 1, PT) minimum assist (75% or more patient effort)  -CT     Time Frame (Bed Mobility Goal 1, PT) by discharge  -CT     Progress/Outcomes (Bed Mobility Goal 1, PT) goal partially met  -CT     Row Name 11/09/22 1524          Transfer Goal 1 (PT)    Activity/Assistive Device (Transfer Goal 1, PT) sit-to-stand/stand-to-sit;bed-to-chair/chair-to-bed  -CT     [place] Level/Cues Needed (Transfer Goal 1, PT) minimum assist (75% or more patient effort)  -CT     Time Frame (Transfer Goal 1, PT) by discharge  -CT     Progress/Outcome (Transfer Goal 1, PT) goal not met  -CT     Row Name 11/09/22 1524          Gait Training Goal 1 (PT)    Activity/Assistive Device (Gait Training Goal 1, PT) gait (walking locomotion);assistive device use  -CT      [place] Level (Gait Training Goal 1, PT) minimum assist (75% or more patient effort)  -CT     Distance (Gait Training Goal 1, PT) 30  -CT     Time Frame (Gait Training Goal 1, PT) by discharge  -CT     Progress/Outcome (Gait Training Goal 1, PT) goal not met  -CT           User Key  (r) = Recorded By, (t) = Taken By, (c) = Cosigned By    Initials Name Provider Type    CT Bhargavi Vásquez PT Physical Therapist                Physical Therapy Education     Title: PT OT SLP Therapies (Done)     Topic: Physical Therapy (Done)     Point: Mobility training (Done)     Learning Progress Summary           Patient Acceptance, E,TB, VU by KK at 11/8/2022 1754    Acceptance, E,TB, VU by KK at 11/7/2022 1635    Acceptance, E,D, VU,NR by LL at 11/5/2022 1325    Acceptance, E, VU by EB at 11/4/2022 1448    Acceptance, E,TB, VU by KM at 11/4/2022 1101                   Point: Home exercise program (Done)     Learning Progress Summary           Patient Acceptance, E,TB, VU by KK at 11/8/2022 1754    Acceptance, E,TB, VU by KK at 11/7/2022 1635    Acceptance, E,D, VU,NR by LL at 11/5/2022 1325    Acceptance, E, VU by EB at 11/4/2022 1448    Acceptance, E,TB, VU by KM at 11/4/2022 1101                   Point: Body mechanics (Done)     Learning Progress Summary           Patient Acceptance, E,TB, VU by KK at 11/8/2022 1754    Acceptance, E,TB, VU by KK at 11/7/2022 1635    Acceptance, E,D, VU,NR by LL at 11/5/2022 1325    Acceptance, E, VU by EB at 11/4/2022 1448    Acceptance, E,TB, VU by KM at 11/4/2022 1101                   Point: Precautions (Done)     Learning Progress Summary           Patient Acceptance, E,TB, VU by KK at 11/8/2022 1754    Acceptance, E,TB, VU by KK at 11/7/2022 1635    Acceptance, E,D, VU,NR by LL at 11/5/2022 1325    Acceptance, E, VU by EB at 11/4/2022 1448    Acceptance, E,TB, VU by KM at 11/4/2022 1101                               User Key     Initials Effective Dates Name Provider Type Discipline     LL 05/02/16 -  Neda Garcia PTA Physical Therapist Assistant PT    KM 05/24/22 -  Mao Vázquez PT Physical Therapist PT    EB 09/22/22 -  Cedric Oliver, RN Registered Nurse Nurse    KK 06/27/22 -  Chelsi Bolivar, RN Registered Nurse Nurse              PT Recommendation and Plan  Anticipated Discharge Disposition (PT): home with 24/7 care, skilled nursing facility  Planned Therapy Interventions (PT): balance training, bed mobility training, gait training, home exercise program, manual therapy techniques, motor coordination training, neuromuscular re-education, patient/family education, postural re-education, strengthening, transfer training  Therapy Frequency (PT): 5 times/wk (5-6 times/wk)  Progress Summary (PT)  Progress Toward Functional Goals (PT): progress toward functional goals is gradual  Plan of Care Reviewed With: patient  Progress: no change   Outcome Measures     Row Name 11/08/22 1509             How much help from another is currently needed...    Putting on and taking off regular lower body clothing? 2  -KP      Bathing (including washing, rinsing, and drying) 2  -KP      Toileting (which includes using toilet bed pan or urinal) 2  -KP      Putting on and taking off regular upper body clothing 3  -KP      Taking care of personal grooming (such as brushing teeth) 3  -KP      Eating meals 4  -KP      AM-PAC 6 Clicks Score (OT) 16  -KP            User Key  (r) = Recorded By, (t) = Taken By, (c) = Cosigned By    Initials Name Provider Type    Linn Richardson, OT Occupational Therapist                 Time Calculation:    PT Charges     Row Name 11/09/22 1538             Time Calculation    Start Time 1253  -CT      Stop Time 1325  -CT      Time Calculation (min) 32 min  -CT      PT Non-Billable Time (min) 30 min  -CT      PT Received On 11/09/22  -CT      PT Goal Re-Cert Due Date 11/23/22  -CT         Time Calculation- PT    Total Timed Code Minutes- PT 17 minute(s)  -CT            User Key  (r) =  Recorded By, (t) = Taken By, (c) = Cosigned By    Initials Name Provider Type    CT Bhargavi Vásquez, PT Physical Therapist              Therapy Charges for Today     Code Description Service Date Service Provider Modifiers Qty    75190964835  PT THERAPEUTIC ACT EA 15 MIN 11/8/2022 Bhargavi Vásquez, PT GP 1    36254025911 HC GAIT TRAINING EA 15 MIN 11/9/2022 Bhargavi Vásquez, PT GP 1    [id number]  PT RE-EVAL ESTABLISHED PLAN 2 11/9/2022 Bhargavi Vásquez, PT GP 1          PT G-Codes  Outcome Measure Options: AM-PAC 6 Clicks Daily Activity (OT)  AM-PAC 6 Clicks Score (PT): 12  AM-PAC 6 Clicks Score (OT): 16    Bhargavi Vásquez PT  11/9/2022

## 2022-11-10 PROCEDURE — 97530 THERAPEUTIC ACTIVITIES: CPT

## 2022-11-10 PROCEDURE — 97116 GAIT TRAINING THERAPY: CPT

## 2022-11-10 PROCEDURE — 97110 THERAPEUTIC EXERCISES: CPT

## 2022-11-10 RX ADMIN — Medication 10 MG: at 21:45

## 2022-11-10 RX ADMIN — TAMSULOSIN HYDROCHLORIDE 0.4 MG: 0.4 CAPSULE ORAL at 09:47

## 2022-11-10 RX ADMIN — FUROSEMIDE 40 MG: 40 TABLET ORAL at 17:57

## 2022-11-10 RX ADMIN — HYDRALAZINE HYDROCHLORIDE 5 MG: 25 TABLET, FILM COATED ORAL at 05:51

## 2022-11-10 RX ADMIN — PANTOPRAZOLE SODIUM 40 MG: 40 TABLET, DELAYED RELEASE ORAL at 05:51

## 2022-11-10 RX ADMIN — ESCITALOPRAM 10 MG: 10 TABLET, FILM COATED ORAL at 09:48

## 2022-11-10 RX ADMIN — Medication 10 ML: at 12:09

## 2022-11-10 RX ADMIN — METOPROLOL TARTRATE 12.5 MG: 25 TABLET, FILM COATED ORAL at 21:45

## 2022-11-10 RX ADMIN — FUROSEMIDE 40 MG: 40 TABLET ORAL at 09:48

## 2022-11-10 RX ADMIN — Medication 1 TABLET: at 09:50

## 2022-11-10 RX ADMIN — APIXABAN 5 MG: 5 TABLET, FILM COATED ORAL at 21:45

## 2022-11-10 RX ADMIN — HYDRALAZINE HYDROCHLORIDE 5 MG: 25 TABLET, FILM COATED ORAL at 15:20

## 2022-11-10 RX ADMIN — APIXABAN 5 MG: 5 TABLET, FILM COATED ORAL at 09:48

## 2022-11-10 RX ADMIN — Medication 10 ML: at 09:51

## 2022-11-10 RX ADMIN — Medication 10 ML: at 21:46

## 2022-11-10 RX ADMIN — HYDRALAZINE HYDROCHLORIDE 5 MG: 25 TABLET, FILM COATED ORAL at 21:44

## 2022-11-10 RX ADMIN — GABAPENTIN 300 MG: 300 CAPSULE ORAL at 21:45

## 2022-11-10 RX ADMIN — METOPROLOL TARTRATE 12.5 MG: 25 TABLET, FILM COATED ORAL at 09:50

## 2022-11-10 NOTE — SIGNIFICANT NOTE
11/10/22 7395   OTHER   Discipline physical therapist   Rehab Time/Intention   Session Not Performed other (see comments)  (Unable to wake pt for treatment. Pt had not eaten lunch and was asleep in bed. OT attempted to facetime Pelon Dorsey with no answer. Will try again later this date.)

## 2022-11-10 NOTE — PLAN OF CARE
Goal Outcome Evaluation:  Plan of Care Reviewed With: patient        Progress: no change  Outcome Evaluation: pt was up in chair from begining of shift until lunch, pt was up for dinner,pt had family visit, and has took naps throughout the day. pt has had no complaints, will continue plan of care

## 2022-11-10 NOTE — PROGRESS NOTES
"Palliative Care Daily Progress Note     S: Medical record reviewed, followed up with Dr. Loredo and RN regarding patient's condition. Upon entering the room, pt found sitting in chair with no distress noted.  Pt denies any needs at this time, very cheerful, reports that she has only missed therapy a few times r/t increased fatigue/weakness. Pt smiling, laughing with staff.       O:   Palliative Performance Scale Score:     /75   Pulse 73   Temp 98.1 °F (36.7 °C) (Oral)   Resp 18   Ht 170.2 cm (67.01\")   Wt 75.3 kg (166 lb)   SpO2 96%   BMI 25.99 kg/m²     Intake/Output Summary (Last 24 hours) at 11/10/2022 1630  Last data filed at 11/10/2022 1350  Gross per 24 hour   Intake 600 ml   Output 1600 ml   Net -1000 ml       PE:    General Appearance:    Chronically ill appearing, weakness, alert, cooperative, NAD   HEENT:    NC/AT, without obvious abnormality, EOMI, anicteric    Neck:   supple, trachea midline, no JVD   Lungs:     CTAB without w/r/r    Heart:    RRR, normal S1 and S2, no M/R/G   Abdomen:     Soft, NT, ND, NABS    Extremities:   Moves all extremities, no edema   Pulses:   Pulses palpable and equal bilaterally   Skin:   Warm, dry   Neurologic:   A/Ox3, cooperative   Psych:   Calm, appropriate         Meds: Reviewed and changes noted     Labs:   Results from last 7 days   Lab Units 11/08/22  0321   WBC 10*3/mm3 6.64   HEMOGLOBIN g/dL 9.6*   HEMATOCRIT % 30.2*   PLATELETS 10*3/mm3 227     Results from last 7 days   Lab Units 11/08/22  0321   SODIUM mmol/L 138   POTASSIUM mmol/L 3.6   CHLORIDE mmol/L 100   CO2 mmol/L 27.2   BUN mg/dL 29*   CREATININE mg/dL 1.74*   GLUCOSE mg/dL 93   CALCIUM mg/dL 8.8     Results from last 7 days   Lab Units 11/08/22  0321 11/05/22  1049   SODIUM mmol/L 138 138   POTASSIUM mmol/L 3.6 3.9   CHLORIDE mmol/L 100 99   CO2 mmol/L 27.2 26.0   BUN mg/dL 29* 31*   CREATININE mg/dL 1.74* 1.96*   CALCIUM mg/dL 8.8 8.8   BILIRUBIN mg/dL  --  0.4   ALK PHOS U/L  --  51   ALT " (SGPT) U/L  --  <5   AST (SGOT) U/L  --  11   GLUCOSE mg/dL 93 104*     Imaging Results (Last 72 Hours)     ** No results found for the last 72 hours. **            Diagnostics: Reviewed    A: Tre Kinney is a 89 y.o. female admitted on 10/5/2022 to swing bed for physical therapy for ten days. She was originally admitted to the hospital on 9/19/2022 due to a fall and symptomatic anemia. She has a medical history CHF, chronic kidney disease, coronary artery disease s/p stenting, history of blood clots, chronic anticoagulation with Coumadin, SSS s/p pacemaker placement, chronic diastolic CHF, and iron deficiency anemia who presents today with a chief complaint of a fall. Per pt she has become progressively more weak and has experienced several falls. Pt has sustained a moderate acute superior endplate vertebral compression fracture at the L1 with about 40% loss of vertebral body height and mild posterior osseous retropulsion of about 6 mm and also noted to have chronic compression fracture deformities involving the inferior endplates of L3 and L4 due to her first fall and from her second fall she does have a large hematoma on her right side, which led her to this admission. Pt had a hemoglobin of 6.1 for which she received 2 units of PRBC's. Pt denies any known source of bleeding and does say she had a scope(EGD colonoscopy at [facility] in 2018.     11/10/22- 160/75 hr 73 rr 18 sat 96% ra , no distress noted, resp even and nonlabored. Pt cheerful, verbalized no concerns.      P:Palliative was consulted to discuss GOC/ACP support for pt and family. Palliative was able to speak with patients son Leno to let him know that we were re-consulted, per IZABEL LUCAS .I will continue to follow up with patient and family tomorrow and PRN, pt is currently swingbed and no additional needs noted at this time.       We will continue to follow along. Please do not hesitate to contact us regarding further sx mgmt or GOC needs,  including after hours or on weekends via our on call provider at [phone].     Sherlyn Pike, APRN    11/10/2022

## 2022-11-10 NOTE — THERAPY TREATMENT NOTE
Acute Care - Occupational Therapy Treatment Note   [place]     Patient Name: Tre Kinney  : 1932  MRN: [id number]  Today's Date: 11/10/2022  Onset of Illness/Injury or Date of Surgery: 10/06/22     Referring Physician: Janneth    Admit Date: 10/5/2022     No diagnosis found.  Patient Active Problem List   Diagnosis   • Acid reflux   • Ankle arthralgia   • Cardiac conduction disorder   • Closed fracture of distal fibula   • Arteriosclerosis of coronary artery   • Fracture of distal end of tibia   • Elevated cholesterol   • BP (high blood pressure)   • Hypertrophic polyarthritis   • OP (osteoporosis)   • Right heart failure (HCC)   • Heart failure, chronic, right-sided (HCC)   • SOB (shortness of breath)   • Palpitations   • CHF (congestive heart failure) (HCC)   • Congestive heart failure (HCC)   • Precordial pain   • Chronic anticoagulation   • Hypokalemia   • Anemia, unspecified type   • Generalized weakness     Past Medical History:   Diagnosis Date   • Anemia    • Arthritis    • CHF (congestive heart failure) ([facility])    • Chronic kidney disease    • Coronary artery disease    • H/O blood clots    • History of transfusion     no reaction    • Hypertension      Past Surgical History:   Procedure Laterality Date   • ABDOMINAL SURGERY     • APPENDECTOMY     • CARDIAC SURGERY     •  SECTION     • COLONOSCOPY N/A 3/7/2018    Procedure: COLONOSCOPY;  Surgeon: Willie Gresham MD;  Location:  [facility];  Service:    • CORONARY ANGIOPLASTY WITH STENT PLACEMENT     • ENDOSCOPY N/A 3/6/2018    Procedure: ESOPHAGOGASTRODUODENOSCOPY;  Surgeon: Willie Gresham MD;  Location:  [facility];  Service:    • PACEMAKER IMPLANTATION      left side    • SKIN BIOPSY     • TUBAL ABDOMINAL LIGATION           OT ASSESSMENT FLOWSHEET (last 12 hours)     OT Evaluation and Treatment     Row Name 11/10/22 1604                   OT Time and Intention    Document Type therapy note (daily note)  -KR        Mode of  Treatment occupational therapy  -KR        Patient Effort good  -KR        Comment Pt presents alert in bed, cooperative for therapy. Facetime with sonWillian, achieved throughout session.  -KR           Bed-Chair Transfer    Bed-Chair [place] (Transfers) moderate assist (50% patient effort);2 person assist  -KR        Assistive Device (Bed-Chair Transfers) walker, front-wheeled  -KR           Shoulder (Therapeutic Exercise)    Shoulder AROM (Therapeutic Exercise) bilateral;flexion;extension;aBduction;aDduction;10 repetitions;2 sets;sitting  -KR           Elbow/Forearm (Therapeutic Exercise)    Elbow/Forearm (Therapeutic Exercise) AROM (active range of motion)  -KR        Elbow/Forearm AROM (Therapeutic Exercise) bilateral;flexion;extension;10 repetitions;2 sets  -KR        Elbow/Forearm Strengthening (Therapeutic Exercise) sitting  -KR           Plan of Care Review    Plan of Care Reviewed With patient;son  -KR              User Key  (r) = Recorded By, (t) = Taken By, (c) = Cosigned By    Initials Name Effective Dates    Andres Ayala, OT 06/16/21 -                        OT Recommendation and Plan     Plan of Care Review  Plan of Care Reviewed With: patient, son  Progress: improving  Plan of Care Reviewed With: patient, son     Outcome Measures     Row Name 11/10/22 1607 11/08/22 1509          How much help from another is currently needed...    Putting on and taking off regular lower body clothing? 2  -KR 2  -KP     Bathing (including washing, rinsing, and drying) 2  -KR 2  -KP     Toileting (which includes using toilet bed pan or urinal) 2  -KR 2  -KP     Putting on and taking off regular upper body clothing 3  -KR 3  -KP     Taking care of personal grooming (such as brushing teeth) 3  -KR 3  -KP     Eating meals 4  -KR 4  -KP     AM-PAC 6 Clicks Score (OT) 16  -KR 16  -KP           User Key  (r) = Recorded By, (t) = Taken By, (c) = Cosigned By    Initials Name Provider Type    Andres Ayala, OT  Occupational Therapist    Linn Richardson OT Occupational Therapist                Time Calculation:    Time Calculation- OT     Row Name 11/10/22 1607             Time Calculation- OT    Total Timed Code Minutes- OT 15 minute(s)  -KARLO            User Key  (r) = Recorded By, (t) = Taken By, (c) = Cosigned By    Initials Name Provider Type    Andres Ayala OT Occupational Therapist              Therapy Charges for Today     Code Description Service Date Service Provider Modifiers Qty    [id number]  OT THERAPEUTIC ACT EA 15 MIN 11/10/2022 Andres Uriarte OT GO 1               Andres Uriarte OT  11/10/2022

## 2022-11-10 NOTE — PROGRESS NOTES
Pt admitted to swing bed on 10/5 for additional therapy in the setting of generalized weakness/debility. Pt not seen or examined today. I have reviewed her VS. I have discussed her care with SONAM Sanches who does not report any acute events or issues that need to be addressed.  BP intermittently elevated today. Cont current antihypertensive regimen for now. Cont Eliquis for treatment of DVT. Pt's family's grievance with insurance company currently pending. Discharge disposition remains unclear at present. Cont PT/OT.       Mustapha Loredo,   11/10/22  15:52 EDT

## 2022-11-10 NOTE — SIGNIFICANT NOTE
Pt sleeping upon therapist arrival for treatment. Nsg consulted, nsg reported pt had been up in chair most of morning and that pt had reported fatigue earlier. OT/PT attempted facetime with sonWillian to inform him that therapy will be attempted again later in the afternoon as pt tolerated. No answer for facetime call to son at time of attempt 12:55 pm. Nursing notified that therapy would be performed later this afternoon as pt may tolerate.

## 2022-11-10 NOTE — PLAN OF CARE
Goal Outcome Evaluation:              Outcome Evaluation: Patient has rested well this shift in bed, no complaints or request at this time, patient is currently up in the bedside chair, VSS, Will continue plan of care.

## 2022-11-10 NOTE — THERAPY TREATMENT NOTE
Acute Care - Physical Therapy Treatment Note   [place]     Patient Name: Tre Kinney  : 1932  MRN: [id number]  Today's Date: 11/10/2022   Onset of Illness/Injury or Date of Surgery: 10/06/22  Visit Dx:   No diagnosis found.  Patient Active Problem List   Diagnosis   • Acid reflux   • Ankle arthralgia   • Cardiac conduction disorder   • Closed fracture of distal fibula   • Arteriosclerosis of coronary artery   • Fracture of distal end of tibia   • Elevated cholesterol   • BP (high blood pressure)   • Hypertrophic polyarthritis   • OP (osteoporosis)   • Right heart failure (HCC)   • Heart failure, chronic, right-sided (HCC)   • SOB (shortness of breath)   • Palpitations   • CHF (congestive heart failure) (HCC)   • Congestive heart failure (HCC)   • Precordial pain   • Chronic anticoagulation   • Hypokalemia   • Anemia, unspecified type   • Generalized weakness     Past Medical History:   Diagnosis Date   • Anemia    • Arthritis    • CHF (congestive heart failure) (HCC)    • Chronic kidney disease    • Coronary artery disease    • H/O blood clots    • History of transfusion     no reaction    • Hypertension      Past Surgical History:   Procedure Laterality Date   • ABDOMINAL SURGERY     • APPENDECTOMY     • CARDIAC SURGERY     •  SECTION     • COLONOSCOPY N/A 3/7/2018    Procedure: COLONOSCOPY;  Surgeon: Willie Gresham MD;  Location:  [facility];  Service:    • CORONARY ANGIOPLASTY WITH STENT PLACEMENT     • ENDOSCOPY N/A 3/6/2018    Procedure: ESOPHAGOGASTRODUODENOSCOPY;  Surgeon: Willie Gresham MD;  Location:  [facility];  Service:    • PACEMAKER IMPLANTATION      left side    • SKIN BIOPSY     • TUBAL ABDOMINAL LIGATION       PT Assessment (last 12 hours)     PT Evaluation and Treatment     Row Name 11/10/22 1425          Physical Therapy Time and Intention    Subjective Information complains of;fatigue  -CT     Document Type therapy note (daily note)  -CT     Mode of Treatment  physical therapy  -CT     Patient Effort good  -CT     Comment Pts treatment session [id number]. Pelon Dorsey called via [employer]. OT present to assist with ambulation and transfers. Pt perfored some BLE sitting ther ex at side of bed. Pt attempted ambulation x 2 this date and walked to chair on second attempt. Pt then finished seated ther ex in bedside chair.  -CT     Row Name 11/10/22 1425          General Information    Patient Profile Reviewed yes  -CT     Existing Precautions/Restrictions fall;other (see comments);TLSO  swan; pt declines use of TLSO  -CT     Limitations/Impairments safety/cognitive  -CT     Row Name 11/10/22 1424          Cognition    Affect/Mental Status (Cognition) WFL  -CT     Orientation Status (Cognition) oriented x 4  -CT     Follows Commands (Cognition) WFL  -CT     Row Name 11/10/22 1424          Bed Mobility    Supine-Sit [place] (Bed Mobility) standby assist;verbal cues;nonverbal cues (demo/gesture)  -CT     Bed Mobility, Safety Issues decreased use of legs for bridging/pushing;decreased use of arms for pushing/pulling;impaired trunk control for bed mobility  -CT     Assistive Device (Bed Mobility) bed rails;draw sheet  -CT     Row Name 11/10/22 1427          Transfers    Transfers sit-stand transfer;stand-sit transfer  -CT     Row Name 11/10/22 1425          Bed-Chair Transfer    Bed-Chair [place] (Transfers) moderate assist (50% patient effort);maximum assist (25% patient effort);2 person assist;verbal cues;nonverbal cues (demo/gesture)  initially Mod A then became Max A closer to chair  -CT     Assistive Device (Bed-Chair Transfers) walker, front-wheeled  -CT     Row Name 11/10/22 1425          Sit-Stand Transfer    Sit-Stand [place] (Transfers) moderate assist (50% patient effort);2 person assist;verbal cues;nonverbal cues (demo/gesture)  -CT     Assistive Device (Sit-Stand Transfers) walker, front-wheeled  -CT     Row Name 11/10/22 1425          Stand-Sit Transfer     Stand-Sit [place] (Transfers) moderate assist (50% patient effort);verbal cues;nonverbal cues (demo/gesture)  -CT     Assistive Device (Stand-Sit Transfers) walker, front-wheeled  -CT     Row Name 11/10/22 1425          Gait/Stairs (Locomotion)    [place] Level (Gait) moderate assist (50% patient effort);maximum assist (25% patient effort);2 person assist;verbal cues;nonverbal cues (demo/gesture)  initially Mod A then became Max A closer to chair  -CT     Assistive Device (Gait) walker, front-wheeled  -CT     Distance in Feet (Gait) 3  -CT     Pattern (Gait) step-to  -CT     Deviations/Abnormal Patterns (Gait) gait speed decreased;festinating/shuffling;weight shifting decreased  -CT     Bilateral Gait Deviations weight shift ability decreased;forward flexed posture;knee buckling bilaterally  -CT     Comment, (Gait/Stairs) Attempted to get pt to ambulate away from bed before going to chair but she reported significant fatigue and attempted to sit away from bed creating a saftey concern. Pt was able to ambulate to chair 3 ft on second attempt  -CT     Row Name 11/10/22 1425          Balance    Static Sitting Balance supervision;verbal cues  verbal cueing to maintain upright  -CT     Row Name 11/10/22 1426          Motor Skills    Therapeutic Exercise --  Sitting ther ex: LAQ x 2,  APx 2, knee abd/add, seated marches, glut sets 20 reps  -CT     Row Name 11/10/22 1421          Coping    Observed Emotional State calm;cooperative  -CT     Verbalized Emotional State acceptance  -CT     Family/Support Persons family  via facetime  -CT     Row Name 11/10/22 1425          Positioning and Restraints    Pre-Treatment Position in bed  -CT     Post Treatment Position chair  -CT     In Chair sitting;call light within reach;encouraged to call for assist  -CT           User Key  (r) = Recorded By, (t) = Taken By, (c) = Cosigned By    Initials Name Provider Type    Bhargavi Woody PT Physical Therapist                 Physical Therapy Education     Title: PT OT SLP Therapies (Done)     Topic: Physical Therapy (Done)     Point: Mobility training (Done)     Learning Progress Summary           Patient Acceptance, E,TB, VU by DM at 11/9/2022 1638    Acceptance, E,TB, VU by KK at 11/8/2022 1754    Acceptance, E,TB, VU by KK at 11/7/2022 1635    Acceptance, E,D, VU,NR by LL at 11/5/2022 1325    Acceptance, E, VU by EB at 11/4/2022 1448    Acceptance, E,TB, VU by KM at 11/4/2022 1101                   Point: Home exercise program (Done)     Learning Progress Summary           Patient Acceptance, E,TB, VU by DM at 11/9/2022 1638    Acceptance, E,TB, VU by KK at 11/8/2022 1754    Acceptance, E,TB, VU by KK at 11/7/2022 1635    Acceptance, E,D, VU,NR by LL at 11/5/2022 1325    Acceptance, E, VU by EB at 11/4/2022 1448    Acceptance, E,TB, VU by KM at 11/4/2022 1101                   Point: Body mechanics (Done)     Learning Progress Summary           Patient Acceptance, E,TB, VU by DM at 11/9/2022 1638    Acceptance, E,TB, VU by KK at 11/8/2022 1754    Acceptance, E,TB, VU by KK at 11/7/2022 1635    Acceptance, E,D, VU,NR by LL at 11/5/2022 1325    Acceptance, E, VU by EB at 11/4/2022 1448    Acceptance, E,TB, VU by KM at 11/4/2022 1101                   Point: Precautions (Done)     Learning Progress Summary           Patient Acceptance, E,TB, VU by DM at 11/9/2022 1638    Acceptance, E,TB, VU by KK at 11/8/2022 1754    Acceptance, E,TB, VU by KK at 11/7/2022 1635    Acceptance, E,D, VU,NR by LL at 11/5/2022 1325    Acceptance, E, VU by EB at 11/4/2022 1448    Acceptance, E,TB, VU by KM at 11/4/2022 1101                               User Key     Initials Effective Dates Name Provider Type Discipline     05/02/16 -  Neda Garcia, NACHO Physical Therapist Assistant PT    DM 06/16/21 -  Moniz, Deborah, RN Registered Nurse Nurse    KM 05/24/22 -  Mao Vázquez, EDEL Physical Therapist PT    EB 09/22/22 -  Cedirc Oliver, SONAM  Registered Nurse Nurse    KASANDRA 06/27/22 -  Chelsi Bolivar RN Registered Nurse Nurse              PT Recommendation and Plan  Anticipated Discharge Disposition (PT): home with 24/7 [facility], [facility] nursing [facility]  Planned Therapy Interventions (PT): balance training, bed mobility training, gait training, home exercise program, manual therapy techniques, motor coordination training, neuromuscular re-education, patient/family education, postural re-education, strengthening, transfer training  Therapy Frequency (PT): 5 times/wk (5-6 times/wk)  Progress Summary (PT)  Progress Toward Functional Goals (PT): progress toward functional goals is gradual  Plan of Care Reviewed With: patient  Progress: no change   Outcome Measures     Row Name 11/08/22 1509             How much help from another is currently needed...    Putting on and taking off regular lower body clothing? 2  -KP      Bathing (including washing, rinsing, and drying) 2  -KP      Toileting (which includes using toilet bed pan or urinal) 2  -KP      Putting on and taking off regular upper body clothing 3  -KP      Taking care of personal grooming (such as brushing teeth) 3  -KP      Eating meals 4  -KP      AM-PAC 6 Clicks Score (OT) 16  -KP            User Key  (r) = Recorded By, (t) = Taken By, (c) = Cosigned By    Initials Name Provider Type    KP Linn Quevedo, OT Occupational Therapist                 Time Calculation:    PT Charges     Row Name 11/10/22 1521             Time Calculation    Start Time 1425  -CT      Stop Time 1505  -CT      Time Calculation (min) 40 min  -CT      PT Received On 11/10/22  -CT            User Key  (r) = Recorded By, (t) = Taken By, (c) = Cosigned By    Initials Name Provider Type    CT Bhargavi Vásquez PT Physical Therapist              Therapy Charges for Today     Code Description Service Date Service Provider Modifiers Qty    29510427295 HC GAIT TRAINING EA 15 MIN 11/9/2022 Bhargavi Vásquez, PT GP 1    05857229268 HC PT RE-EVAL  ESTABLISHED PLAN 2 11/9/2022 Bhargavi Vásquez, PT GP 1    [id number]  GAIT TRAINING EA 15 MIN 11/10/2022 Bhargavi Vásquez, PT GP 1    95569389177 HC PT THER PROC EA 15 MIN 11/10/2022 Bhargavi Vásquez, PT GP 1          PT G-Codes  Outcome Measure Options: AM-PAC 6 Clicks Daily Activity (OT)  AM-PAC 6 Clicks Score (PT): 12  AM-PAC 6 Clicks Score (OT): 16    Bhargavi Vásquez, PT  11/10/2022

## 2022-11-11 PROCEDURE — 97116 GAIT TRAINING THERAPY: CPT

## 2022-11-11 PROCEDURE — 97110 THERAPEUTIC EXERCISES: CPT

## 2022-11-11 PROCEDURE — 97530 THERAPEUTIC ACTIVITIES: CPT

## 2022-11-11 RX ADMIN — Medication 10 ML: at 22:05

## 2022-11-11 RX ADMIN — ESCITALOPRAM 10 MG: 10 TABLET, FILM COATED ORAL at 09:46

## 2022-11-11 RX ADMIN — TAMSULOSIN HYDROCHLORIDE 0.4 MG: 0.4 CAPSULE ORAL at 09:46

## 2022-11-11 RX ADMIN — APIXABAN 5 MG: 5 TABLET, FILM COATED ORAL at 22:01

## 2022-11-11 RX ADMIN — HYDRALAZINE HYDROCHLORIDE 5 MG: 25 TABLET, FILM COATED ORAL at 14:48

## 2022-11-11 RX ADMIN — FUROSEMIDE 40 MG: 40 TABLET ORAL at 17:34

## 2022-11-11 RX ADMIN — Medication 10 MG: at 22:01

## 2022-11-11 RX ADMIN — PANTOPRAZOLE SODIUM 40 MG: 40 TABLET, DELAYED RELEASE ORAL at 05:43

## 2022-11-11 RX ADMIN — HYDRALAZINE HYDROCHLORIDE 5 MG: 25 TABLET, FILM COATED ORAL at 22:01

## 2022-11-11 RX ADMIN — Medication 10 ML: at 09:49

## 2022-11-11 RX ADMIN — GABAPENTIN 300 MG: 300 CAPSULE ORAL at 22:01

## 2022-11-11 RX ADMIN — HYDRALAZINE HYDROCHLORIDE 5 MG: 25 TABLET, FILM COATED ORAL at 05:43

## 2022-11-11 RX ADMIN — FUROSEMIDE 40 MG: 40 TABLET ORAL at 09:46

## 2022-11-11 RX ADMIN — METOPROLOL TARTRATE 12.5 MG: 25 TABLET, FILM COATED ORAL at 22:01

## 2022-11-11 RX ADMIN — METOPROLOL TARTRATE 12.5 MG: 25 TABLET, FILM COATED ORAL at 09:46

## 2022-11-11 RX ADMIN — APIXABAN 5 MG: 5 TABLET, FILM COATED ORAL at 09:46

## 2022-11-11 RX ADMIN — Medication 1 TABLET: at 09:46

## 2022-11-11 NOTE — THERAPY TREATMENT NOTE
Acute Care - Physical Therapy Treatment Note   [place]     Patient Name: Tre Kinney  : 1932  MRN: [id number]  Today's Date: 2022   Onset of Illness/Injury or Date of Surgery: 10/06/22  Visit Dx:   No diagnosis found.  Patient Active Problem List   Diagnosis   • Acid reflux   • Ankle arthralgia   • Cardiac conduction disorder   • Closed fracture of distal fibula   • Arteriosclerosis of coronary artery   • Fracture of distal end of tibia   • Elevated cholesterol   • BP (high blood pressure)   • Hypertrophic polyarthritis   • OP (osteoporosis)   • Right heart failure (HCC)   • Heart failure, chronic, right-sided (HCC)   • SOB (shortness of breath)   • Palpitations   • CHF (congestive heart failure) (HCC)   • Congestive heart failure (HCC)   • Precordial pain   • Chronic anticoagulation   • Hypokalemia   • Anemia, unspecified type   • Generalized weakness     Past Medical History:   Diagnosis Date   • Anemia    • Arthritis    • CHF (congestive heart failure) (HCC)    • Chronic kidney disease    • Coronary artery disease    • H/O blood clots    • History of transfusion     no reaction    • Hypertension      Past Surgical History:   Procedure Laterality Date   • ABDOMINAL SURGERY     • APPENDECTOMY     • CARDIAC SURGERY     •  SECTION     • COLONOSCOPY N/A 3/7/2018    Procedure: COLONOSCOPY;  Surgeon: Willie Gresham MD;  Location:  [facility];  Service:    • CORONARY ANGIOPLASTY WITH STENT PLACEMENT     • ENDOSCOPY N/A 3/6/2018    Procedure: ESOPHAGOGASTRODUODENOSCOPY;  Surgeon: Willie Gresham MD;  Location:  [facility];  Service:    • PACEMAKER IMPLANTATION      left side    • SKIN BIOPSY     • TUBAL ABDOMINAL LIGATION       PT Assessment (last 12 hours)     PT Evaluation and Treatment     Row Name 22 1526          Physical Therapy Time and Intention    Subjective Information complains of;fatigue  -CT     Document Type therapy note (daily note)  -CT     Mode of Treatment  physical therapy  -CT     Patient Effort good  -CT     Comment Treatment session [id number] with Pelon Dorsey via [employer]. OT present to assit with transfers and gait today. Pt ambulate forward 4 ft but experienced a bowl movement and had to be cleaned. Pt then performed sitting ther ex in bedside chair.  -CT     Row Name 11/11/22 1526          General Information    Patient Profile Reviewed yes  -CT     Existing Precautions/Restrictions fall;other (see comments);TLSO  swan; pt declines use of TLSO  -CT     Limitations/Impairments safety/cognitive  -CT     Row Name 11/11/22 1526          Cognition    Affect/Mental Status (Cognition) WFL  -CT     Orientation Status (Cognition) oriented x 4  -CT     Follows Commands (Cognition) WFL  -CT     Row Name 11/11/22 1526          Bed Mobility    Supine-Sit [place] (Bed Mobility) standby assist;verbal cues;nonverbal cues (demo/gesture)  -CT     Bed Mobility, Safety Issues decreased use of legs for bridging/pushing;decreased use of arms for pushing/pulling;impaired trunk control for bed mobility  -CT     Assistive Device (Bed Mobility) bed rails;draw sheet  -CT     Row Name 11/11/22 1526          Transfers    Transfers sit-stand transfer;stand-sit transfer  -CT     Row Name 11/11/22 1526          Bed-Chair Transfer    Bed-Chair [place] (Transfers) moderate assist (50% patient effort);2 person assist;verbal cues;nonverbal cues (demo/gesture)  -CT     Assistive Device (Bed-Chair Transfers) walker, front-wheeled  -CT     Row Name 11/11/22 1526          Sit-Stand Transfer    Sit-Stand [place] (Transfers) moderate assist (50% patient effort);2 person assist;verbal cues;nonverbal cues (demo/gesture)  -CT     Assistive Device (Sit-Stand Transfers) walker, front-wheeled  -CT     Row Name 11/11/22 1526          Stand-Sit Transfer    Stand-Sit [place] (Transfers) moderate assist (50% patient effort);2 person assist;verbal cues;nonverbal cues (demo/gesture)  -CT      Assistive Device (Stand-Sit Transfers) walker, front-wheeled  -CT     Row Name 11/11/22 1526          Gait/Stairs (Locomotion)    [place] Level (Gait) moderate assist (50% patient effort);2 person assist;verbal cues;nonverbal cues (demo/gesture)  -CT     Assistive Device (Gait) walker, front-wheeled  -CT     Distance in Feet (Gait) 4  -CT     Pattern (Gait) step-to  -CT     Deviations/Abnormal Patterns (Gait) gait speed decreased;festinating/shuffling;weight shifting decreased  -CT     Bilateral Gait Deviations weight shift ability decreased;forward flexed posture;knee buckling bilaterally  -CT     Row Name 11/11/22 1526          Motor Skills    Therapeutic Exercise --  BLE sitting ther ex: AP, seated marches, LAQ, hip abd/add, hip abd/add resisted with yellow TB, glut sets x 2, hamstring curls yellow TB 20 sets  -CT     Row Name 11/11/22 1526          Coping    Observed Emotional State calm;cooperative  -CT     Verbalized Emotional State acceptance  -CT     Family/Support Persons family  via facetime  -CT     Row Name 11/11/22 1526          Plan of Care Review    Plan of Care Reviewed With patient  -CT     Row Name 11/11/22 1526          Positioning and Restraints    Pre-Treatment Position in bed  -CT     Post Treatment Position chair  -CT     In Chair sitting;call light within reach;encouraged to call for assist;notified nsg;with OT  -CT           User Key  (r) = Recorded By, (t) = Taken By, (c) = Cosigned By    Initials Name Provider Type    CT Bhargavi Vásquez PT Physical Therapist                Physical Therapy Education     Title: PT OT SLP Therapies (Done)     Topic: Physical Therapy (Done)     Point: Mobility training (Done)     Learning Progress Summary           Patient Acceptance, E,TB, VU by KG at 11/10/2022 1827    Acceptance, E,TB, VU by DM at 11/9/2022 1638    Acceptance, E,TB, VU by KK at 11/8/2022 1754    Acceptance, E,TB, VU by KK at 11/7/2022 1635    Acceptance, E,D, VU,NR by LL at 11/5/2022  1325    Acceptance, E, VU by EB at 11/4/2022 1448    Acceptance, E,TB, VU by KM at 11/4/2022 1101                   Point: Home exercise program (Done)     Learning Progress Summary           Patient Acceptance, E,TB, VU by KG at 11/10/2022 1827    Acceptance, E,TB, VU by DM at 11/9/2022 1638    Acceptance, E,TB, VU by KK at 11/8/2022 1754    Acceptance, E,TB, VU by KK at 11/7/2022 1635    Acceptance, E,D, VU,NR by LL at 11/5/2022 1325    Acceptance, E, VU by EB at 11/4/2022 1448    Acceptance, E,TB, VU by KM at 11/4/2022 1101                   Point: Body mechanics (Done)     Learning Progress Summary           Patient Acceptance, E,TB, VU by KG at 11/10/2022 1827    Acceptance, E,TB, VU by DM at 11/9/2022 1638    Acceptance, E,TB, VU by KK at 11/8/2022 1754    Acceptance, E,TB, VU by KK at 11/7/2022 1635    Acceptance, E,D, VU,NR by LL at 11/5/2022 1325    Acceptance, E, VU by EB at 11/4/2022 1448    Acceptance, E,TB, VU by KM at 11/4/2022 1101                   Point: Precautions (Done)     Learning Progress Summary           Patient Acceptance, E,TB, VU by KG at 11/10/2022 1827    Acceptance, E,TB, VU by DM at 11/9/2022 1638    Acceptance, E,TB, VU by KK at 11/8/2022 1754    Acceptance, E,TB, VU by KK at 11/7/2022 1635    Acceptance, E,D, VU,NR by LL at 11/5/2022 1325    Acceptance, E, VU by EB at 11/4/2022 1448    Acceptance, E,TB, VU by KM at 11/4/2022 1101                               User Key     Initials Effective Dates Name Provider Type Discipline    LL 05/02/16 -  Neda Garcia PTA Physical Therapist Assistant PT    DM 06/16/21 -  Moniz, Deborah, RN Registered Nurse Nurse    KM 05/24/22 -  Maguet, Mao, PT Physical Therapist PT    EB 09/22/22 -  Cedric Oliver, RN Registered Nurse Nurse    KK 06/27/22 -  Chelsi Bolivar, RN Registered Nurse Nurse    KG 09/22/22 -  Verónica Rodriguez, RN Registered Nurse Nurse              PT Recommendation and Plan  Anticipated Discharge Disposition (PT): home with 24/7  care, skilled nursing facility  Planned Therapy Interventions (PT): balance training, bed mobility training, gait training, home exercise program, manual therapy techniques, motor coordination training, neuromuscular re-education, patient/family education, postural re-education, strengthening, transfer training  Therapy Frequency (PT): 5 times/wk (5-6 times/wk)  Progress Summary (PT)  Progress Toward Functional Goals (PT): progress toward functional goals is gradual  Plan of Care Reviewed With: patient  Progress: no change   Outcome Measures     Row Name 11/11/22 1400 11/10/22 1607          How much help from another is currently needed...    Putting on and taking off regular lower body clothing? 2  -LM 2  -KR     Bathing (including washing, rinsing, and drying) 2  -LM 2  -KR     Toileting (which includes using toilet bed pan or urinal) 2  -LM 2  -KR     Putting on and taking off regular upper body clothing 3  -LM 3  -KR     Taking care of personal grooming (such as brushing teeth) 3  -LM 3  -KR     Eating meals 4  -LM 4  -KR     AM-PAC 6 Clicks Score (OT) 16  -LM 16  -KR        Functional Assessment    Outcome Measure Options AM-PAC 6 Clicks Daily Activity (OT)  -LM --           User Key  (r) = Recorded By, (t) = Taken By, (c) = Cosigned By    Initials Name Provider Type    KR nAdres Uriarte, OT Occupational Therapist    LM Татьяна Bah, OT Occupational Therapist                 Time Calculation:    PT Charges     Row Name 11/11/22 1533             Time Calculation    Start Time 1301  -CT      Stop Time 1339  -CT      Time Calculation (min) 38 min  -CT      PT Received On 11/11/22  -CT            User Key  (r) = Recorded By, (t) = Taken By, (c) = Cosigned By    Initials Name Provider Type    CT Bhargavi Vásquez, EDEL Physical Therapist              Therapy Charges for Today     Code Description Service Date Service Provider Modifiers Qty    49902692700 HC GAIT TRAINING EA 15 MIN 11/10/2022 Bhargavi Vásquez, PT GP 1     [id number]  PT THER PROC EA 15 MIN 11/10/2022 Bhargavi Vásquez, PT GP 1    [id number] HC GAIT TRAINING EA 15 MIN 11/11/2022 Bhargavi Vásquez, PT GP 1    [id number] HC PT THER PROC EA 15 MIN 11/11/2022 Bhargavi Vásquez, PT GP 1    [id number]  PT THERAPEUTIC ACT EA 15 MIN 11/11/2022 Bhargavi Vásquez, PT GP 1          PT G-Codes  Outcome Measure Options: AM-PAC 6 Clicks Daily Activity (OT)  AM-PAC 6 Clicks Score (PT): 11  AM-PAC 6 Clicks Score (OT): 16    Bhargavi Vásquez, PT  11/11/2022

## 2022-11-11 NOTE — PLAN OF CARE
Goal Outcome Evaluation:  Plan of Care Reviewed With: patient        Progress: no change  Outcome Evaluation: Pt resting in bed during assessment. I asked pt if she wanted to get up during this shift, but pt declined at this time, will ask again later. No complaints from pt at this time, no acute changes at this time. Will continue to monitor.

## 2022-11-11 NOTE — CASE MANAGEMENT/SOCIAL WORK
Spoke with Anh at [place] [employer] regarding appeal she stated that there was not a phone number on the completed representative form to contact son Willian. I added his number to the form and faxed it back to [place] [employer] to attention Anh. She states that when she gets the form with the correct phone number she will call Willian and start the appeal process. CM director notified.

## 2022-11-11 NOTE — THERAPY TREATMENT NOTE
Acute Care - Occupational Therapy Treatment Note   [place]     Patient Name: Tre Kinney  : 1932  MRN: [id number]  Today's Date: 2022  Onset of Illness/Injury or Date of Surgery: 10/06/22     Referring Physician: Janneth    Admit Date: 10/5/2022     No diagnosis found.  Patient Active Problem List   Diagnosis   • Acid reflux   • Ankle arthralgia   • Cardiac conduction disorder   • Closed fracture of distal fibula   • Arteriosclerosis of coronary artery   • Fracture of distal end of tibia   • Elevated cholesterol   • BP (high blood pressure)   • Hypertrophic polyarthritis   • OP (osteoporosis)   • Right heart failure (HCC)   • Heart failure, chronic, right-sided (HCC)   • SOB (shortness of breath)   • Palpitations   • CHF (congestive heart failure) (HCC)   • Congestive heart failure (HCC)   • Precordial pain   • Chronic anticoagulation   • Hypokalemia   • Anemia, unspecified type   • Generalized weakness     Past Medical History:   Diagnosis Date   • Anemia    • Arthritis    • CHF (congestive heart failure) ([facility])    • Chronic kidney disease    • Coronary artery disease    • H/O blood clots    • History of transfusion     no reaction    • Hypertension      Past Surgical History:   Procedure Laterality Date   • ABDOMINAL SURGERY     • APPENDECTOMY     • CARDIAC SURGERY     •  SECTION     • COLONOSCOPY N/A 3/7/2018    Procedure: COLONOSCOPY;  Surgeon: Willie Gresham MD;  Location:  [facility];  Service:    • CORONARY ANGIOPLASTY WITH STENT PLACEMENT     • ENDOSCOPY N/A 3/6/2018    Procedure: ESOPHAGOGASTRODUODENOSCOPY;  Surgeon: Willie Gresham MD;  Location:  [facility];  Service:    • PACEMAKER IMPLANTATION      left side    • SKIN BIOPSY     • TUBAL ABDOMINAL LIGATION           OT ASSESSMENT FLOWSHEET (last 12 hours)     OT Evaluation and Treatment     Row Name 22 1422                   OT Time and Intention    Subjective Information complains of;weakness;fatigue  -LM         Document Type therapy note (daily note)  -LM        Mode of Treatment occupational therapy  -LM        Patient Effort good  -LM        Comment Patient seen this date for light bue arom/therex from 4514-3335.  Patient participated in light bue therex/arom tasks while seated in bedside chair.  Patient required frequent rest breaks with yellow tband therex and light bue arom activities.  Also assisted PT prior to OT session with fxl mobility.  See PT note.  SonWillian, present on Facetime.  -LM           General Information    Existing Precautions/Restrictions fall  -LM           Cognition    Affect/Mental Status (Cognition) WFL  -LM        Orientation Status (Cognition) oriented x 4  -LM        Follows Commands (Cognition) WFL  -LM           Positioning and Restraints    Post Treatment Position chair  -LM        In Chair encouraged to call for assist;call light within reach  -LM              User Key  (r) = Recorded By, (t) = Taken By, (c) = Cosigned By    Initials Name Effective Dates    LM Татьяна Bah, OT 06/16/21 -                        OT Recommendation and Plan           Outcome Measures     Row Name 11/11/22 1400 11/10/22 1607 11/08/22 1509       How much help from another is currently needed...    Putting on and taking off regular lower body clothing? 2  -LM 2  -KR 2  -KP    Bathing (including washing, rinsing, and drying) 2  -LM 2  -KR 2  -KP    Toileting (which includes using toilet bed pan or urinal) 2  -LM 2  -KR 2  -KP    Putting on and taking off regular upper body clothing 3  -LM 3  -KR 3  -KP    Taking care of personal grooming (such as brushing teeth) 3  -LM 3  -KR 3  -KP    Eating meals 4  -LM 4  -KR 4  -KP    AM-PAC 6 Clicks Score (OT) 16  -LM 16  -KR 16  -KP       Functional Assessment    Outcome Measure Options AM-PAC 6 Clicks Daily Activity (OT)  -LM -- --          User Key  (r) = Recorded By, (t) = Taken By, (c) = Cosigned By    Initials Name Provider Type    Andres Ayala, OT Occupational  Therapist    LM Татьяна Bah, OT Occupational Therapist    Linn Richardson, VELASQUEZ Occupational Therapist                Time Calculation:    Time Calculation- OT     Row Name 11/11/22 1427             Time Calculation- OT    Total Timed Code Minutes- OT 15 minute(s)  -SHAHBAZ            User Key  (r) = Recorded By, (t) = Taken By, (c) = Cosigned By    Initials Name Provider Type     Татьяна Bah, OT Occupational Therapist              Therapy Charges for Today     Code Description Service Date Service Provider Modifiers Qty    09586648152  OT THER PROC EA 15 MIN 11/11/2022 Татьяна Bah, OT GO 1               Татьяна Bah OT  11/11/2022

## 2022-11-11 NOTE — NURSING NOTE
Pt refused to get up for orthostatic Bp at this time and also has refused to get up to chair at this time.

## 2022-11-11 NOTE — PROGRESS NOTES
Pt admitted to swing bed on 10/5 for additional therapy in the setting of generalized weakness/debility. Pt not seen or examined today. I have reviewed her VS. I have discussed her care with SONAM Sanches who does not report any acute events or issues that need to be addressed.  BP improved and stable today. Cont current antihypertensive regimen. Cont Eliquis for treatment of DVT. Pt's family's grievance with insurance company currently pending. Discharge disposition remains unclear at present. Cont PT/OT.       Mustapha Loredo,   11/11/22  15:52 EDT

## 2022-11-11 NOTE — PLAN OF CARE
Goal Outcome Evaluation:  Plan of Care Reviewed With: patient        Progress: no change  Outcome Evaluation: pt currently up in chair, pt refused to get up in chair for breakfast and lunch, pt has no complaints, no acute changes, will continue plan of care

## 2022-11-11 NOTE — CASE MANAGEMENT/SOCIAL WORK
Discharge Planning Assessment  [facility]     Patient Name: Tre Kinney  MRN: [id number]  Today's Date: 11/11/2022    Admit Date: 10/5/2022    Plan: SS discussed pt with , Ileana and swing bed RNMichelet. SS contacted pt's sonWillian on this date. Pt's son does not have an update from pt's insurance. Pt and pt's family is in agreement for pt to return home at discharge. SS notified  and swing bed RN.  has sent a message to [employer] for an update. SS to follow.     Discharge Plan     Row Name 11/11/22 1215       Plan    Plan SS discussed pt with , Ileana and swing bed RNMichelet. SS contacted pt's sonWillian on this date. Pt's son does not have an update from pt's insurance. Pt and pt's family is in agreement for pt to return home at discharge. SS notified  and swing bed RN.  has sent a message to [employer] for an update. SS to follow.    15:38: Swing bed RN provided contact information for pt's sonWillian to [employer]. SS to follow.               Continued Care and Services - Admitted Since 10/5/2022     Destination     Service Provider Request Status Selected Services Address Phone Fax Patient Preferred    [facility] Accepted N/A [address] [facility] [address] [phone] --    [facility] Pending - Request Sent N/A [address] [facility] [address] [phone] --    [facility] Declined  Does not have a secondary payor source N/A [address] [facility] [address] [phone] --            Selected Continued Care - Prior Encounters Includes continued care and service providers with selected services from prior encounters from 7/7/2022 to 11/11/2022    Discharged on 10/5/2022 Admission date: 9/19/2022 - Discharge disposition: Swing Bed    Destination     Service Provider Selected Services Address Phone Fax  Patient Preferred    [facility] [address] [place] [address] [place] [id number] [phone] -- --                    LENORE Koch

## 2022-11-12 PROCEDURE — 97535 SELF CARE MNGMENT TRAINING: CPT

## 2022-11-12 PROCEDURE — 97530 THERAPEUTIC ACTIVITIES: CPT

## 2022-11-12 PROCEDURE — 97110 THERAPEUTIC EXERCISES: CPT

## 2022-11-12 RX ADMIN — Medication 10 ML: at 20:36

## 2022-11-12 RX ADMIN — Medication 10 ML: at 20:35

## 2022-11-12 RX ADMIN — Medication 10 ML: at 09:01

## 2022-11-12 RX ADMIN — Medication 1 TABLET: at 08:57

## 2022-11-12 RX ADMIN — TAMSULOSIN HYDROCHLORIDE 0.4 MG: 0.4 CAPSULE ORAL at 08:57

## 2022-11-12 RX ADMIN — APIXABAN 5 MG: 5 TABLET, FILM COATED ORAL at 20:35

## 2022-11-12 RX ADMIN — GABAPENTIN 300 MG: 300 CAPSULE ORAL at 20:34

## 2022-11-12 RX ADMIN — PANTOPRAZOLE SODIUM 40 MG: 40 TABLET, DELAYED RELEASE ORAL at 05:51

## 2022-11-12 RX ADMIN — HYDRALAZINE HYDROCHLORIDE 5 MG: 25 TABLET, FILM COATED ORAL at 05:50

## 2022-11-12 RX ADMIN — APIXABAN 5 MG: 5 TABLET, FILM COATED ORAL at 08:57

## 2022-11-12 RX ADMIN — ESCITALOPRAM 10 MG: 10 TABLET, FILM COATED ORAL at 08:57

## 2022-11-12 RX ADMIN — HYDRALAZINE HYDROCHLORIDE 5 MG: 25 TABLET, FILM COATED ORAL at 22:53

## 2022-11-12 RX ADMIN — FUROSEMIDE 40 MG: 40 TABLET ORAL at 08:57

## 2022-11-12 RX ADMIN — Medication 10 MG: at 20:35

## 2022-11-12 RX ADMIN — METOPROLOL TARTRATE 12.5 MG: 25 TABLET, FILM COATED ORAL at 08:57

## 2022-11-12 RX ADMIN — FUROSEMIDE 40 MG: 40 TABLET ORAL at 17:55

## 2022-11-12 RX ADMIN — HYDRALAZINE HYDROCHLORIDE 5 MG: 25 TABLET, FILM COATED ORAL at 14:02

## 2022-11-12 RX ADMIN — Medication 10 ML: at 09:00

## 2022-11-12 RX ADMIN — METOPROLOL TARTRATE 12.5 MG: 25 TABLET, FILM COATED ORAL at 20:34

## 2022-11-12 NOTE — THERAPY TREATMENT NOTE
Acute Care - Occupational Therapy Treatment Note   [facility]     Patient Name: Tre Kinney  : 1932  MRN: [id number]  Today's Date: 2022  Onset of Illness/Injury or Date of Surgery: 10/06/22     Referring Physician: Janneth    Admit Date: 10/5/2022     No diagnosis found.  Patient Active Problem List   Diagnosis   • Acid reflux   • Ankle arthralgia   • Cardiac conduction disorder   • Closed fracture of distal fibula   • Arteriosclerosis of coronary artery   • Fracture of distal end of tibia   • Elevated cholesterol   • BP (high blood pressure)   • Hypertrophic polyarthritis   • OP (osteoporosis)   • Right heart failure (HCC)   • Heart failure, chronic, right-sided (HCC)   • SOB (shortness of breath)   • Palpitations   • CHF (congestive heart failure) (HCC)   • Congestive heart failure (HCC)   • Precordial pain   • Chronic anticoagulation   • Hypokalemia   • Anemia, unspecified type   • Generalized weakness     Past Medical History:   Diagnosis Date   • Anemia    • Arthritis    • CHF (congestive heart failure) ([facility])    • Chronic kidney disease    • Coronary artery disease    • H/O blood clots    • History of transfusion     no reaction    • Hypertension      Past Surgical History:   Procedure Laterality Date   • ABDOMINAL SURGERY     • APPENDECTOMY     • CARDIAC SURGERY     •  SECTION     • COLONOSCOPY N/A 3/7/2018    Procedure: COLONOSCOPY;  Surgeon: Willie Gresham MD;  Location:  [facility];  Service:    • CORONARY ANGIOPLASTY WITH STENT PLACEMENT     • ENDOSCOPY N/A 3/6/2018    Procedure: ESOPHAGOGASTRODUODENOSCOPY;  Surgeon: Willie Gresham MD;  Location:  [facility];  Service:    • PACEMAKER IMPLANTATION      left side    • SKIN BIOPSY     • TUBAL ABDOMINAL LIGATION           OT ASSESSMENT FLOWSHEET (last 12 hours)     OT Evaluation and Treatment     Row Name 22 1410                   OT Time and Intention    Document Type therapy note (daily note)  -KR        Mode of  Treatment occupational therapy  -KR        Patient Effort good  -KR        Comment Pt presents motivated to participate with OT on this date. Facetime achieved with sonWillian, to begin session. Facetime discontinued for pt to use BSC during session. Facetime resumed following use of BSC and continued throughout session. OT to continue as tolerated.  -KR           General Information    General Observations of Patient alert/cooperative  -KR           Bed Mobility    Supine-Sit [place] (Bed Mobility) minimum assist (75% patient effort);2 person assist  -KR           Transfer Assessment/Treatment    Transfers toilet transfer  -KR           Toilet Transfer    Type (Toilet Transfer) sit-stand;stand pivot/stand step  -KR        [place] Level (Toilet Transfer) moderate assist (50% patient effort);1 person assist  -KR           Shoulder (Therapeutic Exercise)    Shoulder AROM (Therapeutic Exercise) bilateral;flexion;extension;aBduction;aDduction;sitting;10 repetitions;2 sets  -KR           Elbow/Forearm (Therapeutic Exercise)    Elbow/Forearm (Therapeutic Exercise) AROM (active range of motion)  -KR        Elbow/Forearm AROM (Therapeutic Exercise) bilateral;flexion;extension;sitting  -KR           Wrist (Therapeutic Exercise)    Wrist (Therapeutic Exercise) AROM (active range of motion)  -KR        Wrist AROM (Therapeutic Exercise) bilateral;flexion;extension  -KR           Plan of Care Review    Plan of Care Reviewed With patient;son  -KR              User Key  (r) = Recorded By, (t) = Taken By, (c) = Cosigned By    Initials Name Effective Dates    KR Andres Uriarte, OT 06/16/21 -                        OT Recommendation and Plan     Plan of Care Review  Plan of Care Reviewed With: patient, son  Progress: improving  Plan of Care Reviewed With: patient, son     Outcome Measures     Row Name 11/12/22 1434 11/11/22 1400 11/10/22 1607       How much help from another is currently needed...    Putting on and taking off  regular lower body clothing? 2  -KR 2  -LM 2  -KR    Bathing (including washing, rinsing, and drying) 2  -KR 2  -LM 2  -KR    Toileting (which includes using toilet bed pan or urinal) 2  -KR 2  -LM 2  -KR    Putting on and taking off regular upper body clothing 3  -KR 3  -LM 3  -KR    Taking care of personal grooming (such as brushing teeth) 3  -KR 3  -LM 3  -KR    Eating meals 4  -KR 4  -LM 4  -KR    AM-PAC 6 Clicks Score (OT) 16  -KR 16  -LM 16  -KR       Functional Assessment    Outcome Measure Options -- AM-PAC 6 Clicks Daily Activity (OT)  -LM --          User Key  (r) = Recorded By, (t) = Taken By, (c) = Cosigned By    Initials Name Provider Type    Andres Ayala, OT Occupational Therapist    LM Татьяна Bah OT Occupational Therapist                Time Calculation:    Time Calculation- OT     Row Name 11/12/22 1434             Time Calculation- OT    Total Timed Code Minutes- OT 45 minute(s)  -KR            User Key  (r) = Recorded By, (t) = Taken By, (c) = Cosigned By    Initials Name Provider Type    Andres Ayala OT Occupational Therapist              Therapy Charges for Today     Code Description Service Date Service Provider Modifiers Qty    76235651441  OT THERAPEUTIC ACT EA 15 MIN 11/12/2022 Andres Uriarte OT GO 2    [id number]  OT SELF CARE/MGMT/TRAIN EA 15 MIN 11/12/2022 Andres Uriarte OT GO 1               Andres Uriarte OT  11/12/2022

## 2022-11-12 NOTE — PLAN OF CARE
Goal Outcome Evaluation:  Plan of Care Reviewed With: patient        Progress: no change  Outcome Evaluation: pt resting in bed, pt done exercises at the bedside with PT today, pt refused to get up for breakfast and lunch, no acute changes in pt, will continue plan of care.

## 2022-11-12 NOTE — NURSING NOTE
Offered to get patient up to chair at this time, patient refused. Patient also refused orthostatic blood pressure at this time.

## 2022-11-12 NOTE — PLAN OF CARE
Goal Outcome Evaluation:              Outcome Evaluation: Patient resting in bed on assessment. Declined getting up at this time, stated maybe in the morning. VSS. No complaints or acute changes at this time. Will continue plan of care.

## 2022-11-12 NOTE — PROGRESS NOTES
Pt admitted to swing bed on 10/5 for additional therapy in the setting of generalized weakness/debility. Pt not seen or examined today. I have reviewed her VS. I have discussed her care with SONAM Sanches who does not report any acute events or issues that need to be addressed.  RN reports pt has been more reluctant to get out of bed and work with therapy. BP stable today. Cont current antihypertensive regimen. Cont Eliquis for treatment of DVT. Pt's family's grievance with insurance company currently pending. Discharge disposition remains unclear at present. Cont PT/OT.       Mustapha Loredo,   11/12/22  15:52 EDT

## 2022-11-13 LAB
ANION GAP SERPL CALCULATED.3IONS-SCNC: 11.4 MMOL/L (ref 5–15)
BASOPHILS # BLD AUTO: 0.06 10*3/MM3 (ref 0–0.2)
BASOPHILS NFR BLD AUTO: 0.8 % (ref 0–1.5)
BUN SERPL-MCNC: 27 MG/DL (ref 8–23)
BUN/CREAT SERPL: 14.6 (ref 7–25)
CALCIUM SPEC-SCNC: 8.7 MG/DL (ref 8.6–10.5)
CHLORIDE SERPL-SCNC: 102 MMOL/L (ref 98–107)
CO2 SERPL-SCNC: 26.6 MMOL/L (ref 22–29)
CREAT SERPL-MCNC: 1.85 MG/DL (ref 0.57–1)
DEPRECATED RDW RBC AUTO: 50.1 FL (ref 37–54)
EGFRCR SERPLBLD CKD-EPI 2021: 25.8 ML/MIN/1.73
EOSINOPHIL # BLD AUTO: 0.55 10*3/MM3 (ref 0–0.4)
EOSINOPHIL NFR BLD AUTO: 7.4 % (ref 0.3–6.2)
ERYTHROCYTE [DISTWIDTH] IN BLOOD BY AUTOMATED COUNT: 14.8 % (ref 12.3–15.4)
GLUCOSE SERPL-MCNC: 99 MG/DL (ref 65–99)
HCT VFR BLD AUTO: 30.3 % (ref 34–46.6)
HGB BLD-MCNC: 9.7 G/DL (ref 12–15.9)
IMM GRANULOCYTES # BLD AUTO: 0.03 10*3/MM3 (ref 0–0.05)
IMM GRANULOCYTES NFR BLD AUTO: 0.4 % (ref 0–0.5)
LYMPHOCYTES # BLD AUTO: 1.55 10*3/MM3 (ref 0.7–3.1)
LYMPHOCYTES NFR BLD AUTO: 20.7 % (ref 19.6–45.3)
MCH RBC QN AUTO: 29.8 PG (ref 26.6–33)
MCHC RBC AUTO-ENTMCNC: 32 G/DL (ref 31.5–35.7)
MCV RBC AUTO: 93.2 FL (ref 79–97)
MONOCYTES # BLD AUTO: 0.6 10*3/MM3 (ref 0.1–0.9)
MONOCYTES NFR BLD AUTO: 8 % (ref 5–12)
NEUTROPHILS NFR BLD AUTO: 4.68 10*3/MM3 (ref 1.7–7)
NEUTROPHILS NFR BLD AUTO: 62.7 % (ref 42.7–76)
NRBC BLD AUTO-RTO: 0 /100 WBC (ref 0–0.2)
PLATELET # BLD AUTO: 238 10*3/MM3 (ref 140–450)
PMV BLD AUTO: 9.9 FL (ref 6–12)
POTASSIUM SERPL-SCNC: 3.5 MMOL/L (ref 3.5–5.2)
RBC # BLD AUTO: 3.25 10*6/MM3 (ref 3.77–5.28)
SODIUM SERPL-SCNC: 140 MMOL/L (ref 136–145)
WBC NRBC COR # BLD: 7.47 10*3/MM3 (ref 3.4–10.8)

## 2022-11-13 PROCEDURE — 85025 COMPLETE CBC W/AUTO DIFF WBC: CPT | Performed by: INTERNAL MEDICINE

## 2022-11-13 PROCEDURE — 80048 BASIC METABOLIC PNL TOTAL CA: CPT | Performed by: INTERNAL MEDICINE

## 2022-11-13 RX ORDER — POTASSIUM CHLORIDE 20 MEQ/1
40 TABLET, EXTENDED RELEASE ORAL EVERY 4 HOURS
Status: ACTIVE | OUTPATIENT
Start: 2022-11-13 | End: 2022-11-13

## 2022-11-13 RX ADMIN — HYDRALAZINE HYDROCHLORIDE 5 MG: 25 TABLET, FILM COATED ORAL at 05:56

## 2022-11-13 RX ADMIN — Medication 10 ML: at 08:55

## 2022-11-13 RX ADMIN — METOPROLOL TARTRATE 12.5 MG: 25 TABLET, FILM COATED ORAL at 21:14

## 2022-11-13 RX ADMIN — HYDRALAZINE HYDROCHLORIDE 5 MG: 25 TABLET, FILM COATED ORAL at 14:22

## 2022-11-13 RX ADMIN — PANTOPRAZOLE SODIUM 40 MG: 40 TABLET, DELAYED RELEASE ORAL at 05:56

## 2022-11-13 RX ADMIN — METOPROLOL TARTRATE 12.5 MG: 25 TABLET, FILM COATED ORAL at 08:52

## 2022-11-13 RX ADMIN — FUROSEMIDE 40 MG: 40 TABLET ORAL at 18:49

## 2022-11-13 RX ADMIN — GABAPENTIN 300 MG: 300 CAPSULE ORAL at 21:14

## 2022-11-13 RX ADMIN — Medication 1 TABLET: at 08:51

## 2022-11-13 RX ADMIN — APIXABAN 5 MG: 5 TABLET, FILM COATED ORAL at 21:14

## 2022-11-13 RX ADMIN — HYDROCODONE BITARTRATE AND ACETAMINOPHEN 1 TABLET: 7.5; 325 TABLET ORAL at 21:14

## 2022-11-13 RX ADMIN — Medication 10 ML: at 21:14

## 2022-11-13 RX ADMIN — APIXABAN 5 MG: 5 TABLET, FILM COATED ORAL at 08:51

## 2022-11-13 RX ADMIN — Medication 10 MG: at 21:14

## 2022-11-13 RX ADMIN — FUROSEMIDE 40 MG: 40 TABLET ORAL at 08:51

## 2022-11-13 RX ADMIN — HYDRALAZINE HYDROCHLORIDE 5 MG: 25 TABLET, FILM COATED ORAL at 21:14

## 2022-11-13 RX ADMIN — ESCITALOPRAM 10 MG: 10 TABLET, FILM COATED ORAL at 08:51

## 2022-11-13 RX ADMIN — POTASSIUM CHLORIDE 40 MEQ: 20 TABLET, EXTENDED RELEASE ORAL at 05:56

## 2022-11-13 RX ADMIN — TAMSULOSIN HYDROCHLORIDE 0.4 MG: 0.4 CAPSULE ORAL at 08:51

## 2022-11-13 NOTE — PLAN OF CARE
Goal Outcome Evaluation:  Plan of Care Reviewed With: patient           Outcome Evaluation: patient is resting in bed, no complaints or acute changes

## 2022-11-13 NOTE — PROGRESS NOTES
Pt admitted to swing bed on 10/5 for additional therapy in the setting of generalized weakness/debility. Pt not seen or examined today. I have reviewed her VS and labs. I have discussed her care with SONAM Gagnon who does not report any acute events or issues that need to be addressed. BP stable today. Cont current antihypertensive regimen. K+ low normal on AM labs but pt has declined K+ supplementation. H&H overall stable. Cont Eliquis for treatment of DVT. Pt's family's grievance with insurance company currently pending. Discharge disposition remains unclear at present. Cont PT/OT.       Mustapha Loredo,   11/13/22  15:52 EDT

## 2022-11-14 PROCEDURE — 97116 GAIT TRAINING THERAPY: CPT

## 2022-11-14 PROCEDURE — 99308 SBSQ NF CARE LOW MDM 20: CPT | Performed by: INTERNAL MEDICINE

## 2022-11-14 PROCEDURE — 97110 THERAPEUTIC EXERCISES: CPT

## 2022-11-14 RX ORDER — HYDRALAZINE HYDROCHLORIDE 20 MG/ML
10 INJECTION INTRAMUSCULAR; INTRAVENOUS EVERY 6 HOURS PRN
Status: DISCONTINUED | OUTPATIENT
Start: 2022-11-14 | End: 2023-01-13 | Stop reason: HOSPADM

## 2022-11-14 RX ADMIN — TAMSULOSIN HYDROCHLORIDE 0.4 MG: 0.4 CAPSULE ORAL at 08:42

## 2022-11-14 RX ADMIN — Medication 10 ML: at 20:54

## 2022-11-14 RX ADMIN — METOPROLOL TARTRATE 12.5 MG: 25 TABLET, FILM COATED ORAL at 20:53

## 2022-11-14 RX ADMIN — Medication 10 ML: at 09:40

## 2022-11-14 RX ADMIN — HYDRALAZINE HYDROCHLORIDE 5 MG: 25 TABLET, FILM COATED ORAL at 05:22

## 2022-11-14 RX ADMIN — ESCITALOPRAM 10 MG: 10 TABLET, FILM COATED ORAL at 08:43

## 2022-11-14 RX ADMIN — Medication 10 MG: at 20:53

## 2022-11-14 RX ADMIN — GABAPENTIN 300 MG: 300 CAPSULE ORAL at 20:54

## 2022-11-14 RX ADMIN — FUROSEMIDE 40 MG: 40 TABLET ORAL at 08:42

## 2022-11-14 RX ADMIN — METOPROLOL TARTRATE 12.5 MG: 25 TABLET, FILM COATED ORAL at 08:42

## 2022-11-14 RX ADMIN — Medication 10 ML: at 09:42

## 2022-11-14 RX ADMIN — APIXABAN 5 MG: 5 TABLET, FILM COATED ORAL at 08:42

## 2022-11-14 RX ADMIN — FUROSEMIDE 40 MG: 40 TABLET ORAL at 18:06

## 2022-11-14 RX ADMIN — Medication 1 TABLET: at 08:43

## 2022-11-14 RX ADMIN — HYDRALAZINE HYDROCHLORIDE 5 MG: 25 TABLET, FILM COATED ORAL at 15:03

## 2022-11-14 RX ADMIN — PANTOPRAZOLE SODIUM 40 MG: 40 TABLET, DELAYED RELEASE ORAL at 05:22

## 2022-11-14 RX ADMIN — APIXABAN 5 MG: 5 TABLET, FILM COATED ORAL at 20:54

## 2022-11-14 RX ADMIN — HYDRALAZINE HYDROCHLORIDE 5 MG: 25 TABLET, FILM COATED ORAL at 21:00

## 2022-11-14 NOTE — PAYOR COMM NOTE
"Tre Kinney (89 y.o. Female)    YOB: 1932  Social Security Number:   Address: [address] [place] [address]  Home Phone: [phone]  MRN: [id number]  Worship: [employer]  Marital Status:         Admission Date: 10/5/22  Admission Type: Urgent  Admitting Provider: Jannte Lagunas MD  Attending Provider: Mustapha Loredo DO  Department, Room/Bed: [address], [address]/  Discharge Date:   Discharge Disposition:   Discharge Destination:               Attending Provider: Mustapha Loredo DO    Allergies: Adhesive Tape, Xanax [Alprazolam]    Isolation: None   Infection: None   Code Status: No CPR    Ht: 170.2 cm (67.01\")   Wt: 74.3 kg (163 lb 14.4 oz)    Admission Cmt: None   Principal Problem: None                Active Insurance as of 10/5/2022     Primary Coverage     Payor Plan Insurance Group Employer/Plan Group    ANTH MEDICARE REPLACEMENT ANTH MEDICARE ADVANTAGE [id number]     Payor Plan Address Payor Plan Phone Number Payor Plan Fax Number Effective Dates    [address] [phone]  1/1/2022 - None Entered    [facility] [id number]       Subscriber Name Subscriber Birth Date Member ID       TRE KINNEY 12/9/1932 [id number]                 Emergency Contacts      (Rel.) Home Phone Work Phone Mobile Phone    Valdez Kinney (Son) [phone] None None    Leno Kinney (Son) [phone] None [phone]    Willian Kinney (Son) None None [phone]        Erin Quevedo MSW     Case Management  Case Management/Social Work     Signed  Date of Service:  11/14/22 1105  Creation Time:  11/14/22 1105     Signed        Discharge Planning Assessment  [facility]     Patient Name: Tre Kinney                    MRN: [id number]  Today's Date: 11/14/2022                   Admit Date: 10/5/2022     Plan: Pt is stable for discharge. SS was informed by  that expected discharge date is " Wednesday, 11/16/22. SS notified pt and pt's sonWillian and they are agreeable for pt to be discharged home on 11/16/22. Pt's son to notify SS of discharge needs including home health and DME. SS to follow.             Discharge Plan            Row Name 11/14/22 1102             Plan      Plan Pt is stable for discharge. SS was informed by  that expected discharge date is Wednesday, 11/16/22. SS notified pt and pt's sonWillian and they are agreeable for pt to be discharged home on 11/16/22. Pt's son to notify SS of discharge needs including home health and DME. SS to follow.                      Continued Care and Services - Admitted Since 10/5/2022

## 2022-11-14 NOTE — PAYOR COMM NOTE
"  Michelet Jacques RN  Swing Bed Nurse  [phone] Ex [phone] FAX: [phone]  [email or web address]  [facility]  NPI [id number]  Reference Number Expedited Appeal documents     Expedited clinical updates : Needs to be posted by 12:00pm        Tre Kinney (89 y.o. Female)    YOB: 1932  Social Security Number:   Address: [address] [place] [address]  Home Phone: [phone]  MRN: [id number]  Jainism: Faith  Marital Status:         Admission Date: 10/5/22  Admission Type: Urgent  Admitting Provider: Jannet Lagunas MD  Attending Provider: Mustapha Loredo DO  Department, Room/Bed: [address], 3336/  Discharge Date:   Discharge Disposition:   Discharge Destination:               Attending Provider: Mustapha Loredo DO    Allergies: Adhesive Tape, Xanax [Alprazolam]    Isolation: None   Infection: None   Code Status: No CPR    Ht: 170.2 cm (67.01\")   Wt: 74.3 kg (163 lb 14.4 oz)    Admission Cmt: None   Principal Problem: None                Active Insurance as of 10/5/2022     Primary Coverage     Payor Plan Insurance Group Employer/Plan Group    ANTH MEDICARE REPLACEMENT ANTH MEDICARE ADVANTAGE [id number]     Payor Plan Address Payor Plan Phone Number Payor Plan Fax Number Effective Dates    [address] [phone]  1/1/2022 - None Entered    [facility] [id number]       Subscriber Name Subscriber Birth Date Member ID       TRE KINNEY 12/9/1932 [id number]                 Emergency Contacts      (Rel.) Home Phone Work Phone Mobile Phone    Valdez Kinney (Son) [phone] None None    Leno Kinney (Son) [phone] None [phone]    Willian Kinney (Son) None None [phone]              Current Facility-Administered Medications   Medication Dose Route Frequency Provider Last Rate Last Admin   • apixaban (ELIQUIS) tablet 5 mg  5 mg Oral Q12H Mustapha Loredo DO   5 mg at " 11/14/22 0842   • benzocaine-menthol (DERMOPLAST) 20-0.5 % topical spray   Topical 4x Daily PRN Kennedi Saucedo MD   Given at 11/03/22 0548   • docusate sodium (COLACE) capsule 100 mg  100 mg Oral BID PRN Kennedi Saucedo MD   100 mg at 11/01/22 2155   • escitalopram (LEXAPRO) tablet 10 mg  10 mg Oral Daily Jannet Lagunas MD   10 mg at 11/14/22 0843   • furosemide (LASIX) tablet 40 mg  40 mg Oral BID Jannet Lagunas MD   40 mg at 11/14/22 0842   • gabapentin (NEURONTIN) capsule 300 mg  300 mg Oral Nightly Jannet Lagunas MD   300 mg at 11/13/22 2114   • guaiFENesin-dextromethorphan (ROBITUSSIN DM) 100-10 MG/5ML syrup 5 mL  5 mL Oral Q4H PRN Kennedi Saucedo MD       • hydrALAZINE (APRESOLINE) tablet 5 mg  5 mg Oral Q8H Jannet Lagunas MD   5 mg at 11/14/22 0522   • HYDROcodone-acetaminophen (NORCO) 7.5-325 MG per tablet 1 tablet  1 tablet Oral Q8H PRN Jannet Lagunas MD   1 tablet at 11/13/22 2114   • Magnesium Sulfate 2 gram Bolus, followed by 8 gram infusion (total Mg dose 10 grams)- Mg less than or equal to 1mg/dL  2 g Intravenous PRN Jannet Lagunas MD        Or   • Magnesium Sulfate 2 gram / 50mL Infusion (GIVE X 3 BAGS TO EQUAL 6GM TOTAL DOSE) - Mg 1.1 - 1.5 mg/dl  2 g Intravenous PRN Jannet Lagunas MD        Or   • Magnesium Sulfate 4 gram infusion- Mg 1.6-1.9 mg/dL  4 g Intravenous PRN Jannet Lagunas MD       • melatonin tablet 10 mg  10 mg Oral Nightly Jannet Lagunas MD   10 mg at 11/13/22 2114   • metoprolol tartrate (LOPRESSOR) tablet 12.5 mg  12.5 mg Oral Q12H Jannet Lagunas MD   12.5 mg at 11/14/22 0842   • multivitamin (THERAGRAN) tablet 1 tablet  1 tablet Oral Daily Jannet Lagunas MD   1 tablet at 11/14/22 0843   • ondansetron (ZOFRAN) injection 4 mg  4 mg Intravenous Q6H PRN Jannet Lagunas MD       • pantoprazole (PROTONIX) EC tablet 40 mg  40 mg Oral Q AM Jannet Lagunas MD   40 mg at 11/14/22 0522   • Pharmacy Consult   Does not apply  Continuous PRN Mustapha Loredo DO       • phenol (CHLORASEPTIC) 1.4 % liquid 1 spray  1 spray Mouth/Throat Q2H PRN Jannet Lagunas MD   1 spray at 10/13/22 2056   • polyethylene glycol (MIRALAX) packet 17 g  17 g Oral BID PRN Jannet Lagunas MD       • potassium chloride (K-DUR,KLOR-CON) CR tablet 40 mEq  40 mEq Oral PRN Jannet Lagunas MD        Or   • potassium chloride (KLOR-CON) packet 40 mEq  40 mEq Oral PRN Jannet Lagunas MD        Or   • potassium chloride 10 mEq in 100 mL IVPB  10 mEq Intravenous Q1H PRN Jannet Lagunas MD       • prochlorperazine (COMPAZINE) injection 5 mg  5 mg Intravenous Q6H PRN Jannet Lagunas MD        Or   • prochlorperazine (COMPAZINE) tablet 5 mg  5 mg Oral Q6H PRN Jannet Lagunas MD        Or   • prochlorperazine (COMPAZINE) suppository 25 mg  25 mg Rectal Q12H PRN Jannet Lagunas MD       • promethazine (PHENERGAN) 6.25 mg in sodium chloride 0.9 % 50 mL  6.25 mg Intravenous Q6H PRN Jannet Lagunas MD       • sodium chloride 0.9 % flush 10 mL  10 mL Intravenous PRN Jannet Lagunas MD       • sodium chloride 0.9 % flush 10 mL  10 mL Intravenous Q12H Jannet Lagunas MD   10 mL at 11/14/22 0940   • sodium chloride 0.9 % flush 10 mL  10 mL Intravenous PRN Jannet Lagunas MD       • sodium chloride 0.9 % flush 10 mL  10 mL Intravenous Q12H Jannet Lagunas MD   10 mL at 11/14/22 0942   • sodium chloride 0.9 % flush 10 mL  10 mL Intravenous PRN Jannet Lagunas MD       • tamsulosin (FLOMAX) 24 hr capsule 0.4 mg  0.4 mg Oral Daily Jannet Lagunas MD   0.4 mg at 11/14/22 0842        Physician Progress Notes (last 7 days)      Mustapha Loredo DO at 11/13/22 1713        Pt admitted to swing bed on 10/5 for additional therapy in the setting of generalized weakness/debility. Pt not seen or examined today. I have reviewed her VS and labs. I have discussed her care with SONAM Gagnon who does not report any acute events or issues that need  to be addressed. BP stable today. Cont current antihypertensive regimen. K+ low normal on AM labs but pt has declined K+ supplementation. H&H overall stable. Cont Eliquis for treatment of DVT. Pt's family's grievance with insurance company currently pending. Discharge disposition remains unclear at present. Cont PT/OT.       Mustapha Loredo DO  11/13/22  15:52 EDT                Electronically signed by Mustapha Loredo DO at 11/13/22 1714     Mustapha Loredo DO at 11/12/22 1302        Pt admitted to swing bed on 10/5 for additional therapy in the setting of generalized weakness/debility. Pt not seen or examined today. I have reviewed her VS. I have discussed her care with SONAM Sanches who does not report any acute events or issues that need to be addressed.  RN reports pt has been more reluctant to get out of bed and work with therapy. BP stable today. Cont current antihypertensive regimen. Cont Eliquis for treatment of DVT. Pt's family's grievance with insurance company currently pending. Discharge disposition remains unclear at present. Cont PT/OT.       Mustapha Loredo DO  11/12/22  15:52 EDT                Electronically signed by Mustapha Loredo DO at 11/12/22 1302     Mustapha Loredo DO at 11/11/22 1837        Pt admitted to swing bed on 10/5 for additional therapy in the setting of generalized weakness/debility. Pt not seen or examined today. I have reviewed her VS. I have discussed her care with SONAM Sanches who does not report any acute events or issues that need to be addressed.  BP improved and stable today. Cont current antihypertensive regimen. Cont Eliquis for treatment of DVT. Pt's family's grievance with insurance company currently pending. Discharge disposition remains unclear at present. Cont PT/OT.       Mustapha Loredo DO  11/11/22  15:52 EDT                Electronically signed by Mustapha Loredo DO at 11/11/22 1838     Gertrude  "Mustapha KWAN DO at 11/10/22 1739        Pt admitted to swing bed on 10/5 for additional therapy in the setting of generalized weakness/debility. Pt not seen or examined today. I have reviewed her VS. I have discussed her care with SONAM Sanches who does not report any acute events or issues that need to be addressed.  BP intermittently elevated today. Cont current antihypertensive regimen for now. Cont Eliquis for treatment of DVT. Pt's family's grievance with insurance company currently pending. Discharge disposition remains unclear at present. Cont PT/OT.       Mustapha Loredo DO  11/10/22  15:52 EDT                Electronically signed by Mustapha Loredo DO at 11/10/22 1742     Sherlyn Pike, APRN at 11/10/22 1630          Palliative Care Daily Progress Note     S: Medical record reviewed, followed up with Dr. Loredo and RN regarding patient's condition. Upon entering the room, pt found sitting in chair with no distress noted.  Pt denies any needs at this time, very cheerful, reports that she has only missed therapy a few times r/t increased fatigue/weakness. Pt smiling, laughing with staff.       O:   Palliative Performance Scale Score:     /75   Pulse 73   Temp 98.1 °F (36.7 °C) (Oral)   Resp 18   Ht 170.2 cm (67.01\")   Wt 75.3 kg (166 lb)   SpO2 96%   BMI 25.99 kg/m²     Intake/Output Summary (Last 24 hours) at 11/10/2022 1630  Last data filed at 11/10/2022 1350  Gross per 24 hour   Intake 600 ml   Output 1600 ml   Net -1000 ml       PE:    General Appearance:    Chronically ill appearing, weakness, alert, cooperative, NAD   HEENT:    NC/AT, without obvious abnormality, EOMI, anicteric    Neck:   supple, trachea midline, no JVD   Lungs:     CTAB without w/r/r    Heart:    RRR, normal S1 and S2, no M/R/G   Abdomen:     Soft, NT, ND, NABS    Extremities:   Moves all extremities, no edema   Pulses:   Pulses palpable and equal bilaterally   Skin:   Warm, dry   Neurologic:   A/Ox3, " cooperative   Psych:   Calm, appropriate         Meds: Reviewed and changes noted     Labs:   Results from last 7 days   Lab Units 11/08/22  0321   WBC 10*3/mm3 6.64   HEMOGLOBIN g/dL 9.6*   HEMATOCRIT % 30.2*   PLATELETS 10*3/mm3 227     Results from last 7 days   Lab Units 11/08/22  0321   SODIUM mmol/L 138   POTASSIUM mmol/L 3.6   CHLORIDE mmol/L 100   CO2 mmol/L 27.2   BUN mg/dL 29*   CREATININE mg/dL 1.74*   GLUCOSE mg/dL 93   CALCIUM mg/dL 8.8     Results from last 7 days   Lab Units 11/08/22  0321 11/05/22  1049   SODIUM mmol/L 138 138   POTASSIUM mmol/L 3.6 3.9   CHLORIDE mmol/L 100 99   CO2 mmol/L 27.2 26.0   BUN mg/dL 29* 31*   CREATININE mg/dL 1.74* 1.96*   CALCIUM mg/dL 8.8 8.8   BILIRUBIN mg/dL  --  0.4   ALK PHOS U/L  --  51   ALT (SGPT) U/L  --  <5   AST (SGOT) U/L  --  11   GLUCOSE mg/dL 93 104*     Imaging Results (Last 72 Hours)     ** No results found for the last 72 hours. **            Diagnostics: Reviewed    A: Tre Kinney is a 89 y.o. female admitted on 10/5/2022 to [facility] for physical therapy for ten days. She was originally admitted to the hospital on 9/19/2022 due to a fall and symptomatic anemia. She has a medical history CHF, chronic kidney disease, coronary artery disease s/p stenting, history of blood clots, chronic anticoagulation with Coumadin, SSS s/p pacemaker placement, chronic diastolic CHF, and iron deficiency anemia who presents today with a chief complaint of a fall. Per pt she has become progressively more weak and has experienced several falls. Pt has sustained a moderate acute superior endplate vertebral compression fracture at the L1 with about 40% loss of vertebral body height and mild posterior osseous retropulsion of about 6 mm and also noted to have chronic compression fracture deformities involving the inferior endplates of L3 and L4 due to her first fall and from her second fall she does have a large hematoma on her right side, which led her to this admission.  Pt had a hemoglobin of 6.1 for which she received 2 units of PRBC's. Pt denies any known source of bleeding and does say she had a scope(EGD colonoscopy at [facility] in 2018.     11/10/22- 160/75 hr 73 rr 18 sat 96% ra , no distress noted, resp even and nonlabored. Pt cheerful, verbalized no concerns.      P:Palliative was consulted to discuss GOC/ACP support for pt and family. Palliative was able to speak with patients son Leno to let him know that we were re-consulted, per IZABEL LUCAS .I will continue to follow up with patient and family tomorrow and PRN, pt is currently swingbed and no additional needs noted at this time.       We will continue to follow along. Please do not hesitate to contact us regarding further sx mgmt or GOC needs, including after hours or on weekends via our on call provider at [phone].     LANCE Anand    11/10/2022    Electronically signed by Sherlyn Pike APRN at 11/10/22 1637     Mustapha Loredo DO at 11/09/22 1621        Pt admitted to swing bed on 10/5 for additional therapy in the setting of generalized weakness/debility. Pt not seen or examined today. I have reviewed her VS. I have discussed her care with SONAM Green who does not report any acute events or issues that need to be addressed.  BP stable today. Cont current antihypertensive regimen. Cont Eliquis for treatment of DVT. Pt's family's grievance with insurance company currently pending. Discharge disposition unclear at present. Cont PT/OT.       Mustapha Loredo DO  11/09/22  15:52 EDT                Electronically signed by Mustapha Loredo DO at 11/09/22 1622     Mustapha Loredo DO at 11/08/22 1415        Pt admitted to swing bed on 10/5 for additional therapy in the setting of generalized weakness/debility. Pt not seen or examined today. I have reviewed her VS and labs. I have discussed her care with SONAM Gagnon who does not report any acute events or issues that need to be  addressed. RN reports that patient remains weak  BP stable today. Cont current antihypertensive regimen. Cont Eliquis for treatment of DVT.   Pt's family's grievance with insurance company currently pending. Cont PT/OT.       Mustapha Loredo DO  11/08/22  15:52 EDT                Electronically signed by Mustapha Loredo DO at 11/08/22 1417     Mustapha Loredo DO at 11/07/22 1527                [facility]    Subjective     History:   Tre Kinney is a 89 y.o. female admitted on 10/5/2022 secondary to <principal problem not specified>     Procedures: None    CC: Follow up debility    Patient seen and examined with SONAM Gagnon. Awake and alert. Sitting in bedside chair during my encounter. Continues to report weakness and fatigue. Leg pain appears much improved. No reported CP. No reported nausea or vomiting. No acute events overnight per RN.     History taken from: patient, chart, and RN.      Objective     Vital Signs  Temp:  [97.7 °F (36.5 °C)] 97.7 °F (36.5 °C)  Heart Rate:  [75-84] 77  Resp:  [16] 16  BP: (112-149)/(60-75) 148/74    Intake/Output Summary (Last 24 hours) at 11/7/2022 1527  Last data filed at 11/7/2022 1300  Gross per 24 hour   Intake 1040 ml   Output 1200 ml   Net -160 ml         Physical Exam:  General:    Awake, alert, in no acute distress, chronically ill appearing   Heart:      Normal S1 and S2. Regular rate and rhythm. No significant murmur, rubs or gallops appreciated.   Lungs:     Respirations regular, even and unlabored. Lungs clear to auscultation B/L. No wheezes, rales or rhonchi.   Abdomen:   Soft and nontender. No guarding, rebound tenderness or  organomegaly noted. Bowel sounds present x 4.   Extremities:  No lower extremity edema. Moves UE and LE equally B/L.     Results Review:    Results from last 7 days   Lab Units 11/05/22  1049   WBC 10*3/mm3 7.66   HEMOGLOBIN g/dL 10.7*   PLATELETS 10*3/mm3 252     Results from last 7  days   Lab Units 11/05/22  1049 11/01/22  1658   SODIUM mmol/L 138  --    POTASSIUM mmol/L 3.9 4.3   CHLORIDE mmol/L 99  --    CO2 mmol/L 26.0  --    BUN mg/dL 31*  --    CREATININE mg/dL 1.96*  --    CALCIUM mg/dL 8.8  --    GLUCOSE mg/dL 104*  --      Results from last 7 days   Lab Units 11/05/22  1049   BILIRUBIN mg/dL 0.4   ALK PHOS U/L 51   AST (SGOT) U/L 11   ALT (SGPT) U/L <5                   Imaging Results (Last 24 Hours)     ** No results found for the last 24 hours. **            Medications:  apixaban, 5 mg, Oral, Q12H  escitalopram, 10 mg, Oral, Daily  furosemide, 40 mg, Oral, BID  gabapentin, 300 mg, Oral, Nightly  hydrALAZINE, 5 mg, Oral, Q8H  melatonin, 10 mg, Oral, Nightly  metoprolol tartrate, 12.5 mg, Oral, Q12H  multivitamin, 1 tablet, Oral, Daily  pantoprazole, 40 mg, Oral, Q AM  sodium chloride, 10 mL, Intravenous, Q12H  sodium chloride, 10 mL, Intravenous, Q12H  tamsulosin, 0.4 mg, Oral, Daily      Pharmacy Consult,             Assessment & Plan   Generalized weakness/debility  Diffuse LLE DVT  DIANA on CKD IV  Acute blood loss anemia  Traumatic right gluteal hematoma  Mild hypokalemia   Paroxysmal Afib  Hx of SSS s/p pacemaker placement  Essential HTN  Reported hx of DVT's   Anxiety/depression  Advanced age    Plan  BP currently stable and controlled on current regimen. Appears to be tolerating Eliquis well with no obvious signs of bleeding. Reviewed labs from 11/5 which revealed stable H&H and stable renal function. Cont PT/OT. Cont supportive treatment. Repeat labs in the AM.     Disposition: Awaiting response from insurance company regarding grievance filed by family. Will need further discussions with patient and family regarding disposition at discharge.       Mustapha Loredo DO  11/07/22  15:27 EST    Electronically signed by Mustapha Loredo DO at 11/07/22 1532     Farzad Vazquez MD at 11/07/22 1100          Palliative Care Daily Progress Note     S: Clinically, the  "patient is making slow progress.  She has been discouraged but does state that she feels stronger this week.  She continues to participate in therapies with ultimate goal of getting home with her son.      O:   Palliative Performance Scale Score:     /64 (BP Location: Right arm, Patient Position: Lying)   Pulse 77   Temp 97.7 °F (36.5 °C) (Oral)   Resp 16   Ht 170.2 cm (67.01\")   Wt 75.2 kg (165 lb 12.8 oz)   SpO2 98%   BMI 25.96 kg/m²     Intake/Output Summary (Last 24 hours) at 11/7/2022 1101  Last data filed at 11/7/2022 0700  Gross per 24 hour   Intake 830 ml   Output 1650 ml   Net -820 ml       PE:  General Appearance:   alert, cooperative, NAD   HEENT:    NC/AT    Neck:   supple   Lungs:     CTAB    Heart:    RRR   Abdomen:     Soft, NT    Extremities:   Moves all extremities, no edema   Pulses:   Pulses palpable    Skin:   Warm, dry   Neurologic:   Awake, cooperative, speech and mentation are intact   Psych:   Calm, appropriate         Meds: Reviewed     Labs:   Results from last 7 days   Lab Units 11/05/22  1049   WBC 10*3/mm3 7.66   HEMOGLOBIN g/dL 10.7*   HEMATOCRIT % 33.3*   PLATELETS 10*3/mm3 252     Results from last 7 days   Lab Units 11/05/22  1049   SODIUM mmol/L 138   POTASSIUM mmol/L 3.9   CHLORIDE mmol/L 99   CO2 mmol/L 26.0   BUN mg/dL 31*   CREATININE mg/dL 1.96*   GLUCOSE mg/dL 104*   CALCIUM mg/dL 8.8     Results from last 7 days   Lab Units 11/05/22  1049   SODIUM mmol/L 138   POTASSIUM mmol/L 3.9   CHLORIDE mmol/L 99   CO2 mmol/L 26.0   BUN mg/dL 31*   CREATININE mg/dL 1.96*   CALCIUM mg/dL 8.8   BILIRUBIN mg/dL 0.4   ALK PHOS U/L 51   ALT (SGPT) U/L <5   AST (SGOT) U/L 11   GLUCOSE mg/dL 104*     Imaging Results (Last 72 Hours)     Procedure Component Value Units Date/Time    XR Chest PA & Lateral [[id number]] Collected: 11/05/22 1407     Updated: 11/05/22 1410    Narrative:      EXAMINATION: XR CHEST PA AND LATERAL-      CLINICAL INDICATION: Worsening tiredness, new cough      "   COMPARISON: 10/01/2022      TECHNIQUE: XR CHEST PA AND LATERAL-      FINDINGS:   LUNGS: Lungs are adequately aerated.      HEART AND MEDIASTINUM: Large hiatal hernia     SKELETON: Bony and soft tissue structures are unremarkable.             Impression:      Large hiatal hernia.  Lungs are adequately aerated     This report was finalized on 11/5/2022 2:08 PM by Dr. Bob Lovell MD.               Diagnostics: Reviewed    A: Clinically improved with slow progress in therapies.      P: Continue present management.  We will continue to offer encouragement and support.      We will continue to follow along. Please do not hesitate to contact us regarding further sx mgmt or GOC needs, including after hours or on weekends via our on call provider at [phone].     Farzad Vazquez MD    11/7/2022    Electronically signed by Farzad Vazquez MD at 11/07/22 1103          Consult Notes (most recent note)      Andrew Chapman MD at 11/03/22 1249      Consult Orders    1. Inpatient Psychiatrist Consult [[id number]] ordered by Kennedi Saucedo MD at 11/03/22 0744    2. Inpatient Psychiatrist Consult [[id number]] ordered by Kennedi Saucedo MD at 10/31/22 1147                   Referring Provider: Lewis  Reason for Consultation: Capacity      Chief complaint/Focus of Exam: Capacity Evaluation     Subjective .     History of present illness: Patient is an 89-year-old female with a history significant for CHF, CKD, CAD, history of clots, chronic anticoagulation, sick sinus syndrome with pacer placement, chronic diastolic CHF, anemia, and depression who originally admitted to the hospital after a fall.  Psychiatry was originally consulted early in her hospitalization 1 month ago for depressive symptoms and for medication management in light of hyponatremia.  I evaluated patient at that time.  Psychiatry was reconsulted to evaluate patient for medical decision-making capacity as there have been some concerns about  treatment care planning going forward and family has been involved.  I evaluated patient at bedside today while she was eating lunch.  She was sitting up and reported that she was having some neck pain but denied any other complaints.  She was alert and oriented x4.  She is able to answer all my questions appropriately.  She was not experiencing any confusion, hallucinations, delusions, or paranoia.  She denied SI/HI/AVH.  She was making jokes about being in the hospital and seemed to be in good spirits despite her neck pain.    Review of Systems  Pertinent items are noted in HPI    History  Past Medical History:   Diagnosis Date   • Anemia    • Arthritis    • CHF (congestive heart failure) (HCC)    • Chronic kidney disease    • Coronary artery disease    • H/O blood clots    • History of transfusion     no reaction    • Hypertension    ,   Past Surgical History:   Procedure Laterality Date   • ABDOMINAL SURGERY     • APPENDECTOMY     • CARDIAC SURGERY     •  SECTION     • COLONOSCOPY N/A 3/7/2018    Procedure: COLONOSCOPY;  Surgeon: Willie Gresham MD;  Location:  [facility];  Service:    • CORONARY ANGIOPLASTY WITH STENT PLACEMENT     • ENDOSCOPY N/A 3/6/2018    Procedure: ESOPHAGOGASTRODUODENOSCOPY;  Surgeon: Willie Gresham MD;  Location:  [email or web address];  Service:    • PACEMAKER IMPLANTATION      left side    • SKIN BIOPSY     • TUBAL ABDOMINAL LIGATION     ,   Family History   Problem Relation Age of Onset   • Other Mother         cardiac disorder   • Hypertension Mother    • Heart disease Mother    • Thyroid disease Mother    • Other Father         cardiac disorder   • Heart disease Father    • Hypertension Father    • Other Sister         cardiac disorder   • Other Brother         cardiac disorder   • Other Other         cardiac disorder   • Other Other         cardiac disorder   ,   Social History     Socioeconomic History   • Marital status:    Tobacco Use   • Smoking status: Never    Substance and Sexual Activity   • Alcohol use: No   • Drug use: No   • Sexual activity: Defer     E-cigarette/Vaping     E-cigarette/Vaping Substances     E-cigarette/Vaping Devices       ,   Medications Prior to Admission   Medication Sig Dispense Refill Last Dose   • ALPRAZolam (XANAX) 1 MG tablet Take 1 mg by mouth 2 (Two) Times a Day As Needed for Anxiety.   Unknown at Unknown time   • aspirin 81 MG EC tablet Take 81 mg by mouth Daily.   Unknown at Unknown time   • escitalopram (LEXAPRO) 10 MG tablet Take 10 mg by mouth Daily.   Unknown at Unknown time   • furosemide (LASIX) 40 MG tablet Take 40 mg by mouth Daily.   Unknown at Unknown time   • gabapentin (NEURONTIN) 400 MG capsule Take 400 mg by mouth Every Night.   Unknown at Unknown time   • hydrALAZINE (APRESOLINE) 100 MG tablet Take 1 tablet by mouth Every 8 (Eight) Hours. 90 tablet 0 Unknown at Unknown time   • HYDROcodone-acetaminophen (NORCO) 7.5-325 MG per tablet Take 1 tablet by mouth Every 8 (Eight) Hours As Needed for Moderate Pain.   Unknown at Unknown time   • isosorbide mononitrate (IMDUR) 60 MG 24 hr tablet Take 60 mg by mouth Daily.   Unknown at Unknown time   • metoprolol tartrate (LOPRESSOR) 50 MG tablet Take 50 mg by mouth 2 (Two) Times a Day.   Unknown at Unknown time   • pantoprazole (PROTONIX) 40 MG EC tablet Take 40 mg by mouth Daily.   Unknown at Unknown time   • potassium chloride (MICRO-K) 10 MEQ CR capsule Take 10 mEq by mouth 2 (Two) Times a Day.   Unknown at Unknown time   • warfarin (COUMADIN) 6 MG tablet Take 6 mg by mouth Daily.   Unknown at Unknown time   , Scheduled Meds:  apixaban, 5 mg, Oral, Q12H  escitalopram, 10 mg, Oral, Daily  furosemide, 40 mg, Oral, BID  gabapentin, 300 mg, Oral, Nightly  hydrALAZINE, 5 mg, Oral, Q8H  melatonin, 10 mg, Oral, Nightly  metoprolol tartrate, 12.5 mg, Oral, Q12H  multivitamin, 1 tablet, Oral, Daily  pantoprazole, 40 mg, Oral, Q AM  sodium chloride, 10 mL, Intravenous, Q12H  sodium  chloride, 10 mL, Intravenous, Q12H  tamsulosin, 0.4 mg, Oral, Daily    , Continuous Infusions:  Pharmacy Consult,     , PRN Meds:  •  benzocaine-menthol  •  docusate sodium  •  HYDROcodone-acetaminophen  •  magnesium sulfate **OR** magnesium sulfate **OR** magnesium sulfate  •  ondansetron  •  Pharmacy Consult  •  phenol  •  polyethylene glycol  •  potassium chloride **OR** potassium chloride **OR** potassium chloride  •  prochlorperazine **OR** prochlorperazine **OR** prochlorperazine  •  promethazine  •  sodium chloride  •  sodium chloride  •  sodium chloride and Allergies:  Adhesive tape and Xanax [alprazolam]    Objective     Vital Signs   Temp:  [98.2 °F (36.8 °C)-98.4 °F (36.9 °C)] 98.2 °F (36.8 °C)  Heart Rate:  [71-77] 75  Resp:  [16-18] 16  BP: (129-161)/() 161/116    Mental Status Exam:   Mental Status Exam:    Hygiene:   good  Cooperation:  Cooperative  Eye Contact:  Good  Psychomotor Behavior:  Slow  Affect:  Full range  Hopelessness: Optimistic  Speech:  Normal  Thought Progress:  Goal directed and Linear  Thought Content:  Normal and Mood congruent  Suicidal:  None  Homicidal:  None  Hallucinations:  None  Delusion:  None  Memory:  Intact  Orientation:  Person, Place, Time and Situation  Reliability:  good  Insight:  Fair  Judgement:  Fair  Impulse Control:  Fair    Results Review:   I reviewed the patient's new clinical results.  Lab Results (last 24 hours)     ** No results found for the last 24 hours. **        Imaging Results (Last 24 Hours)     ** No results found for the last 24 hours. **            Assessment & Plan     Evaluation for medical decision making capacity  -At this point in time, patient does have capacity for medical decision making.  She is alert and oriented and able to answer questions appropriately and I do not have any concerns about her making her own treatment plan and decisions going forward.    I discussed the patients findings and my recommendations with  patient    Andrew Chapman MD  22  12:49 EDT        Electronically signed by Andrew Chapman MD at 22 1253          Nutrition Notes (most recent note)      Ileana Villa RD at 22 1437        Nutrition Services    Patient Name:  Tre Kinney  YOB: 1932  MRN: [id number]  Admit Date:  10/5/2022    Swing bed note: BMI 25.8 Diet - regular soft texture chopped -  thin liquids - Magic cup bid - po averaging 55-60% encourage fluid intake - continue to encourage intake     Electronically signed by:  Ileana Villa RD  22 14:38 EST     Electronically signed by Ileana Villa RD at 22 1440          Physical Therapy Notes (most recent note)      Martina Blum PT at 22 1450  Version 1 of 1         Acute Care - Physical Therapy Treatment Note  [facility]     Patient Name: Tre Kinney  : 1932  MRN: [id number]  Today's Date: 2022   Onset of Illness/Injury or Date of Surgery: 10/06/22  Visit Dx:   No diagnosis found.  Patient Active Problem List   Diagnosis   • Acid reflux   • Ankle arthralgia   • Cardiac conduction disorder   • Closed fracture of distal fibula   • Arteriosclerosis of coronary artery   • Fracture of distal end of tibia   • Elevated cholesterol   • BP (high blood pressure)   • Hypertrophic polyarthritis   • OP (osteoporosis)   • Right heart failure (HCC)   • Heart failure, chronic, right-sided (HCC)   • SOB (shortness of breath)   • Palpitations   • CHF (congestive heart failure) (HCC)   • Congestive heart failure (HCC)   • Precordial pain   • Chronic anticoagulation   • Hypokalemia   • Anemia, unspecified type   • Generalized weakness     Past Medical History:   Diagnosis Date   • Anemia    • Arthritis    • CHF (congestive heart failure) (HCC)    • Chronic kidney disease    • Coronary artery disease    • H/O blood clots    • History of transfusion     no reaction    • Hypertension      Past Surgical History:   Procedure Laterality  Date   • ABDOMINAL SURGERY     • APPENDECTOMY     • CARDIAC SURGERY     •  SECTION     • COLONOSCOPY N/A 3/7/2018    Procedure: COLONOSCOPY;  Surgeon: Willie Gresham MD;  Location:  [facility];  Service:    • CORONARY ANGIOPLASTY WITH STENT PLACEMENT     • ENDOSCOPY N/A 3/6/2018    Procedure: ESOPHAGOGASTRODUODENOSCOPY;  Surgeon: Willie Gresham MD;  Location:  [facility];  Service:    • PACEMAKER IMPLANTATION      left side    • SKIN BIOPSY     • TUBAL ABDOMINAL LIGATION       PT Assessment (last 12 hours)     PT Evaluation and Treatment     Row Name 22 592          Physical Therapy Time and Intention    Document Type therapy note (daily note)  -     Mode of Treatment physical therapy  -     Patient Effort good  -     Comment FT with son during treatment.  Patient was agreeable to PT treatment and willing to sit EOB and perform sit to stand transfers with FWW.  -     Row Name 22 042          General Information    Patient Profile Reviewed yes  -     Patient Observations alert;cooperative;agree to therapy  -     Existing Precautions/Restrictions fall;other (see comments);TLSO  Pt does not want to use TLSO during treatment.  -     Limitations/Impairments safety/cognitive  -     Row Name 22 6491          Pain    Pain Location - Side/Orientation Left;Right  -     Pain Location - knee  -     Pre/Posttreatment Pain Comment Pt reports pain in knee's with supine heel slide therapeutic exercises.  She reports arthritis at baseline and knee's hurt during all movement.  -     Row Name 22 1556          Cognition    Follows Commands (Cognition) WFL  -     Row Name 22 7846          Bed Mobility    Scooting/Bridging [place] (Bed Mobility) minimum assist (75% patient effort);1 person assist  -     Supine-Sit [place] (Bed Mobility) minimum assist (75% patient effort);2 person assist;verbal cues  -     Row Name 22 8063          Sit-Stand  Transfer    Sit-Stand [place] (Transfers) moderate assist (50% patient effort);2 person assist;verbal cues  -     Assistive Device (Sit-Stand Transfers) walker, front-wheeled  -     Comment, (Sit-Stand Transfer) Pt education on proper body mechanics and posture to improve weight shifting and decrease need of assistance  -     Row Name 11/12/22 1315          Stand-Sit Transfer    Stand-Sit [place] (Transfers) moderate assist (50% patient effort);1 person assist;verbal cues  -     Assistive Device (Stand-Sit Transfers) walker, front-wheeled  -     Row Name 11/12/22 1315          Balance    Static Sitting Balance contact guard;standby assist  -     Comment, Balance Cues to improve anterior weight shifting during EOB sitting in preparation for transfer training and improve core strength  -[facility] Name 11/12/22 1315          Hip (Therapeutic Exercise)    Hip Strengthening (Therapeutic Exercise) left;right;aBduction;aDduction;flexion;heel slides;supine;15 repititions  -[facility] Name 11/12/22 1315          Knee (Therapeutic Exercise)    Knee Strengthening (Therapeutic Exercise) left;right;heel slides;supine;10 repetitions  -[facility] Name 11/12/22 1315          Ankle (Therapeutic Exercise)    Ankle Strengthening (Therapeutic Exercise) right;left;dorsiflexion;plantarflexion;supine;20 repititions  -     Row Name 11/12/22 1315          Plan of Care Review    Plan of Care Reviewed With patient  -     Outcome Evaluation PT treatment complete.  Son was facetimed during treatment.  Patient performed supine therapeutic exercises and sit to stand transfer training with FWW.  Cues to improve body mechanics and posture.  -     Row Name 11/12/22 1315          Positioning and Restraints    Pre-Treatment Position in bed  -     Post Treatment Position bs  -KP     On BS commode sitting;call light within reach;encouraged to call for assist  -           User Key  (r) = Recorded By, (t) = Taken By, (c)  = Cosigned By    Initials Name Provider Type    Martina Davsi PT Physical Therapist                Physical Therapy Education     Title: PT OT SLP Therapies (Done)     Topic: Physical Therapy (Done)     Point: Mobility training (Done)     Learning Progress Summary           Patient Acceptance, E,TB, VU by KG at 11/10/2022 1827    Acceptance, E,TB, VU by DM at 11/9/2022 1638    Acceptance, E,TB, VU by KK at 11/8/2022 1754    Acceptance, E,TB, VU by KK at 11/7/2022 1635    Acceptance, E,D, VU,NR by LL at 11/5/2022 1325    Acceptance, E, VU by EB at 11/4/2022 1448    Acceptance, E,TB, VU by KM at 11/4/2022 1101                   Point: Home exercise program (Done)     Learning Progress Summary           Patient Acceptance, E,TB, VU by KG at 11/10/2022 1827    Acceptance, E,TB, VU by DM at 11/9/2022 1638    Acceptance, E,TB, VU by KK at 11/8/2022 1754    Acceptance, E,TB, VU by KK at 11/7/2022 1635    Acceptance, E,D, VU,NR by LL at 11/5/2022 1325    Acceptance, E, VU by EB at 11/4/2022 1448    Acceptance, E,TB, VU by KM at 11/4/2022 1101                   Point: Body mechanics (Done)     Learning Progress Summary           Patient Acceptance, E,TB, VU by KG at 11/10/2022 1827    Acceptance, E,TB, VU by DM at 11/9/2022 1638    Acceptance, E,TB, VU by KK at 11/8/2022 1754    Acceptance, E,TB, VU by KK at 11/7/2022 1635    Acceptance, E,D, VU,NR by LL at 11/5/2022 1325    Acceptance, E, VU by EB at 11/4/2022 1448    Acceptance, E,TB, VU by KM at 11/4/2022 1101                   Point: Precautions (Done)     Learning Progress Summary           Patient Acceptance, E,TB, VU by KG at 11/10/2022 1827    Acceptance, E,TB, VU by DM at 11/9/2022 1638    Acceptance, E,TB, VU by KK at 11/8/2022 1754    Acceptance, E,TB, VU by KK at 11/7/2022 1635    Acceptance, E,D, VU,NR by LL at 11/5/2022 1325    Acceptance, E, VU by EB at 11/4/2022 1448    Acceptance, E,TB, VU by KM at 11/4/2022 1101                               User Key      Initials Effective Dates Name Provider Type Discipline    LL 05/02/16 -  Neda Garcia PTA Physical Therapist Assistant PT    DM 06/16/21 -  Moniz, Deborah, RN Registered Nurse Nurse    KM 05/24/22 -  Mao Vázquez, EDEL Physical Therapist PT    EB 09/22/22 -  Cedric Oliver, RN Registered Nurse Nurse    KK 06/27/22 -  Chelsi Bolivar, RN Registered Nurse Nurse    KG 09/22/22 -  Verónica Rodriguez, RN Registered Nurse Nurse              PT Recommendation and Plan     Plan of Care Reviewed With: patient  Outcome Evaluation: PT treatment complete.  Son was facetimed during treatment.  Patient performed supine therapeutic exercises and sit to stand transfer training with FWW.  Cues to improve body mechanics and posture.   Outcome Measures     Row Name 11/12/22 1434 11/11/22 1400 11/10/22 1607       How much help from another person do you currently need...    Turning from your back to your side while in flat bed without using bedrails? 2  -KP -- --    Moving from lying on back to sitting on the side of a flat bed without bedrails? 2  -KP -- --    Moving to and from a bed to a chair (including a wheelchair)? 2  -KP -- --    Standing up from a chair using your arms (e.g., wheelchair, bedside chair)? 2  -KP -- --    Climbing 3-5 steps with a railing? 1  -KP -- --    To walk in hospital room? 2  -KP -- --    AM-PAC 6 Clicks Score (PT) 11  -KP -- --       How much help from another is currently needed...    Putting on and taking off regular lower body clothing? 2  -KR 2  -LM 2  -KR    Bathing (including washing, rinsing, and drying) 2  -KR 2  -LM 2  -KR    Toileting (which includes using toilet bed pan or urinal) 2  -KR 2  -LM 2  -KR    Putting on and taking off regular upper body clothing 3  -KR 3  -LM 3  -KR    Taking care of personal grooming (such as brushing teeth) 3  -KR 3  -LM 3  -KR    Eating meals 4  -KR 4  -LM 4  -KR    AM-PAC 6 Clicks Score (OT) 16  -KR 16  -LM 16  -KR       Functional Assessment    Outcome  Measure Options AM-PAC 6 Clicks Basic Mobility (PT)  -KP AM-PAC 6 Clicks Daily Activity (OT)  - --          User Key  (r) = Recorded By, (t) = Taken By, (c) = Cosigned By    Initials Name Provider Type    Andres Ayala, OT Occupational Therapist     Татьяна Bah, OT Occupational Therapist    KP Martina Blum, PT Physical Therapist                 Time Calculation:    PT Charges     Row Name 22 1450             Time Calculation    Start Time 1315  -KP      Stop Time 1340  -KP      Time Calculation (min) 25 min  -KP      PT Received On 22  -      PT Goal Re-Cert Due Date 22  -         Timed Charges    97411 - PT Therapeutic Exercise Minutes 10  -KP      28962 - PT Therapeutic Activity Minutes 15  -KP         Total Minutes    Timed Charges Total Minutes 25  -KP       Total Minutes 25  -KP            User Key  (r) = Recorded By, (t) = Taken By, (c) = Cosigned By    Initials Name Provider Type     Martina Blum, EDEL Physical Therapist              Therapy Charges for Today     Code Description Service Date Service Provider Modifiers Qty    98998395045 HC PT THER PROC EA 15 MIN 2022 Martina Blum, PT GP 1    21445284577 HC PT THERAPEUTIC ACT EA 15 MIN 2022 Martina Blum, PT GP 1          PT G-Codes  Outcome Measure Options: AM-PAC 6 Clicks Basic Mobility (PT)  AM-PAC 6 Clicks Score (PT): 11  AM-PAC 6 Clicks Score (OT): 16    Martina Blum PT  2022      Electronically signed by Martina Blum PT at 22 1451          Occupational Therapy Notes (most recent note)      Andres Uriarte, OT at 22 1410          Acute Care - Occupational Therapy Treatment Note  [facility]     Patient Name: Tre Kinney  : 1932  MRN: [id number]  Today's Date: 2022  Onset of Illness/Injury or Date of Surgery: 10/06/22     Referring Physician: Janneth    Admit Date: 10/5/2022     No diagnosis found.  Patient Active Problem List   Diagnosis   • Acid reflux   • Ankle  arthralgia   • Cardiac conduction disorder   • Closed fracture of distal fibula   • Arteriosclerosis of coronary artery   • Fracture of distal end of tibia   • Elevated cholesterol   • BP (high blood pressure)   • Hypertrophic polyarthritis   • OP (osteoporosis)   • Right heart failure (HCC)   • Heart failure, chronic, right-sided (HCC)   • SOB (shortness of breath)   • Palpitations   • CHF (congestive heart failure) (HCC)   • Congestive heart failure (HCC)   • Precordial pain   • Chronic anticoagulation   • Hypokalemia   • Anemia, unspecified type   • Generalized weakness     Past Medical History:   Diagnosis Date   • Anemia    • Arthritis    • CHF (congestive heart failure) (HCC)    • Chronic kidney disease    • Coronary artery disease    • H/O blood clots    • History of transfusion     no reaction    • Hypertension      Past Surgical History:   Procedure Laterality Date   • ABDOMINAL SURGERY     • APPENDECTOMY     • CARDIAC SURGERY     •  SECTION     • COLONOSCOPY N/A 3/7/2018    Procedure: COLONOSCOPY;  Surgeon: Willie Gresham MD;  Location:  [facility];  Service:    • CORONARY ANGIOPLASTY WITH STENT PLACEMENT     • ENDOSCOPY N/A 3/6/2018    Procedure: ESOPHAGOGASTRODUODENOSCOPY;  Surgeon: Willie Gresham MD;  Location:  [facility];  Service:    • PACEMAKER IMPLANTATION      left side    • SKIN BIOPSY     • TUBAL ABDOMINAL LIGATION           OT ASSESSMENT FLOWSHEET (last 12 hours)     OT Evaluation and Treatment     Row Name 22 1410                   OT Time and Intention    Document Type therapy note (daily note)  -KR        Mode of Treatment occupational therapy  -KR        Patient Effort good  -KR        Comment Pt presents motivated to participate with OT on this date. Facetime achieved with sonWillian, to begin session. Facetime discontinued for pt to use BSC during session. Facetime resumed following use of BSC and continued throughout session. OT to continue as tolerated.  -KR            General Information    General Observations of Patient alert/cooperative  -KR           Bed Mobility    Supine-Sit [place] (Bed Mobility) minimum assist (75% patient effort);2 person assist  -KR           Transfer Assessment/Treatment    Transfers toilet transfer  -KR           Toilet Transfer    Type (Toilet Transfer) sit-stand;stand pivot/stand step  -KR        [place] Level (Toilet Transfer) moderate assist (50% patient effort);1 person assist  -KR           Shoulder (Therapeutic Exercise)    Shoulder AROM (Therapeutic Exercise) bilateral;flexion;extension;aBduction;aDduction;sitting;10 repetitions;2 sets  -KR           Elbow/Forearm (Therapeutic Exercise)    Elbow/Forearm (Therapeutic Exercise) AROM (active range of motion)  -KR        Elbow/Forearm AROM (Therapeutic Exercise) bilateral;flexion;extension;sitting  -KR           Wrist (Therapeutic Exercise)    Wrist (Therapeutic Exercise) AROM (active range of motion)  -KR        Wrist AROM (Therapeutic Exercise) bilateral;flexion;extension  -KR           Plan of Care Review    Plan of Care Reviewed With patient;son  -KR              User Key  (r) = Recorded By, (t) = Taken By, (c) = Cosigned By    Initials Name Effective Dates    KR Andres Uriarte, OT 06/16/21 -                        OT Recommendation and Plan     Plan of Care Review  Plan of Care Reviewed With: patient, son  Progress: improving  Plan of Care Reviewed With: patient, son     Outcome Measures     Row Name 11/12/22 1434 11/11/22 1400 11/10/22 1607       How much help from another is currently needed...    Putting on and taking off regular lower body clothing? 2  -KR 2  -LM 2  -KR    Bathing (including washing, rinsing, and drying) 2  -KR 2  -LM 2  -KR    Toileting (which includes using toilet bed pan or urinal) 2  -KR 2  -LM 2  -KR    Putting on and taking off regular upper body clothing 3  -KR 3  -LM 3  -KR    Taking care of personal grooming (such as brushing teeth) 3  -KR 3  -LM 3   -KR    Eating meals 4  -KR 4  -LM 4  -KR    AM-PAC 6 Clicks Score (OT) 16  -KR 16  -LM 16  -KR       Functional Assessment    Outcome Measure Options -- AM-PAC 6 Clicks Daily Activity (OT)  -LM --          User Key  (r) = Recorded By, (t) = Taken By, (c) = Cosigned By    Initials Name Provider Type    Andres Ayala, OT Occupational Therapist    LM Татьяна Bah, OT Occupational Therapist                Time Calculation:    Time Calculation- OT     Row Name 11/12/22 1434             Time Calculation- OT    Total Timed Code Minutes- OT 45 minute(s)  -KR            User Key  (r) = Recorded By, (t) = Taken By, (c) = Cosigned By    Initials Name Provider Type    Andres Ayala, OT Occupational Therapist              Therapy Charges for Today     Code Description Service Date Service Provider Modifiers Qty    45185099292  OT THERAPEUTIC ACT EA 15 MIN 11/12/2022 Andres Uriarte OT GO 2    [id number]  OT SELF CARE/MGMT/TRAIN EA 15 MIN 11/12/2022 Andres Uriarte OT GO 1               Andres Uriarte OT  11/12/2022    Electronically signed by Andres Uriarte OT at 11/12/22 8270

## 2022-11-14 NOTE — PLAN OF CARE
Goal Outcome Evaluation:  Plan of Care Reviewed With: patient           Outcome Evaluation: patient is resting in bed, up to chair today. no complaints or acute changes at this time

## 2022-11-14 NOTE — PROGRESS NOTES
"Palliative Care Daily Progress Note     S: Clinically, there has been no significant change.  The patient continues to be very weak and has not responded much or made much improvement with therapies.  Her son is at bedside and they state that they have been told they will be discharged on Wednesday.  The son is distressed saying he needs to find some help taking care of her as he cannot.      O:   Palliative Performance Scale Score:     /74 (BP Location: Left arm, Patient Position: Lying)   Pulse 77   Temp 98 °F (36.7 °C) (Oral)   Resp 16   Ht 170.2 cm (67.01\")   Wt 74.3 kg (163 lb 14.4 oz)   SpO2 97%   BMI 25.66 kg/m²     Intake/Output Summary (Last 24 hours) at 11/14/2022 1311  Last data filed at 11/14/2022 0100  Gross per 24 hour   Intake 240 ml   Output 500 ml   Net -260 ml       PE:  General Appearance:    Chronically ill appearing, weak and frail   HEENT:    NC/AT   Neck:   supple   Lungs:     CTAB without w/r/r    Heart:    RRR   Abdomen:     Soft, nondistended   Extremities:  1+ edema   Pulses:   Pulses palpable    Skin:   Warm, dry   Neurologic:   A/Ox3, cooperative   Psych:   Calm, appropriate         Meds: Reviewed    Labs:   Results from last 7 days   Lab Units 11/13/22  0116   WBC 10*3/mm3 7.47   HEMOGLOBIN g/dL 9.7*   HEMATOCRIT % 30.3*   PLATELETS 10*3/mm3 238     Results from last 7 days   Lab Units 11/13/22  0116   SODIUM mmol/L 140   POTASSIUM mmol/L 3.5   CHLORIDE mmol/L 102   CO2 mmol/L 26.6   BUN mg/dL 27*   CREATININE mg/dL 1.85*   GLUCOSE mg/dL 99   CALCIUM mg/dL 8.7     Results from last 7 days   Lab Units 11/13/22  0116   SODIUM mmol/L 140   POTASSIUM mmol/L 3.5   CHLORIDE mmol/L 102   CO2 mmol/L 26.6   BUN mg/dL 27*   CREATININE mg/dL 1.85*   CALCIUM mg/dL 8.7   GLUCOSE mg/dL 99     Imaging Results (Last 72 Hours)     ** No results found for the last 72 hours. **            Diagnostics: Reviewed    A: Clinically with no significant change.  The patient has not made much " progress with therapies.      P: Continue present management.  We will continue to offer support and assist with discharge planning.      We will continue to follow along. Please do not hesitate to contact us regarding further sx mgmt or GOC needs, including after hours or on weekends via our on call provider at [phone].     Farzad Vazquez MD    11/14/2022

## 2022-11-14 NOTE — THERAPY TREATMENT NOTE
Acute Care - Physical Therapy Treatment Note   [place]     Patient Name: Tre Kinney  : 1932  MRN: [id number]  Today's Date: 2022   Onset of Illness/Injury or Date of Surgery: 10/06/22  Visit Dx:   No diagnosis found.  Patient Active Problem List   Diagnosis   • Acid reflux   • Ankle arthralgia   • Cardiac conduction disorder   • Closed fracture of distal fibula   • Arteriosclerosis of coronary artery   • Fracture of distal end of tibia   • Elevated cholesterol   • BP (high blood pressure)   • Hypertrophic polyarthritis   • OP (osteoporosis)   • Right heart failure (HCC)   • Heart failure, chronic, right-sided (HCC)   • SOB (shortness of breath)   • Palpitations   • CHF (congestive heart failure) (HCC)   • Congestive heart failure (HCC)   • Precordial pain   • Chronic anticoagulation   • Hypokalemia   • Anemia, unspecified type   • Generalized weakness     Past Medical History:   Diagnosis Date   • Anemia    • Arthritis    • CHF (congestive heart failure) (HCC)    • Chronic kidney disease    • Coronary artery disease    • H/O blood clots    • History of transfusion     no reaction    • Hypertension      Past Surgical History:   Procedure Laterality Date   • ABDOMINAL SURGERY     • APPENDECTOMY     • CARDIAC SURGERY     •  SECTION     • COLONOSCOPY N/A 3/7/2018    Procedure: COLONOSCOPY;  Surgeon: Willie Gresham MD;  Location:  [facility];  Service:    • CORONARY ANGIOPLASTY WITH STENT PLACEMENT     • ENDOSCOPY N/A 3/6/2018    Procedure: ESOPHAGOGASTRODUODENOSCOPY;  Surgeon: Willie Gresham MD;  Location:  [facility];  Service:    • PACEMAKER IMPLANTATION      left side    • SKIN BIOPSY     • TUBAL ABDOMINAL LIGATION       PT Assessment (last 12 hours)     PT Evaluation and Treatment     Row Name 22 1450          Physical Therapy Time and Intention    Subjective Information complains of;weakness  -CT     Document Type therapy note (daily note)  -CT     Mode of Treatment  physical therapy  -CT     Patient Effort good  -CT     Comment Pts treatment session [id number]. Asked pt if she wanted to call her son via [employer] and she declined. No family in room. Pt performed sit to stand from bedside and had a bowel movement. [place] [facility] [place] 2 and then stood for cleaning. Pt ambulated 3 ft to chair and attempted to sit early before getting turned to chair. Educated pt on importance of completing transfer and getting into position in front of chair before attempting to sit. Pt performed sitting ther ex in chair.  -CT     Row Name 11/14/22 1450          General Information    Patient Profile Reviewed yes  -CT     Existing Precautions/Restrictions fall;other (see comments);TLSO  swan; pt declines use of TLSO  -CT     Limitations/Impairments safety/cognitive  -CT     Row Name 11/14/22 1450          Cognition    Affect/Mental Status (Cognition) WFL  -CT     Orientation Status (Cognition) oriented x 4  -CT     Follows Commands (Cognition) WFL  -CT     Row Name 11/14/22 1450          Bed Mobility    Supine-Sit [place] (Bed Mobility) standby assist;verbal cues;nonverbal cues (demo/gesture)  -CT     Bed Mobility, Safety Issues decreased use of legs for bridging/pushing;decreased use of arms for pushing/pulling;impaired trunk control for bed mobility  -CT     Assistive Device (Bed Mobility) bed rails;draw sheet  -CT     Row Name 11/14/22 1450          Transfers    Transfers sit-stand transfer;stand-sit transfer  -CT     Row Name 11/14/22 1450          Bed-Chair Transfer    Bed-Chair [place] (Transfers) maximum assist (25% patient effort);2 person assist;verbal cues;nonverbal cues (demo/gesture)  -CT     Assistive Device (Bed-Chair Transfers) walker, front-wheeled  -CT     Row Name 11/14/22 1451          Sit-Stand Transfer    Sit-Stand [place] (Transfers) moderate assist (50% patient effort);maximum assist (25% patient effort);2 person assist;verbal cues;nonverbal cues  (demo/gesture)  -CT     Assistive Device (Sit-Stand Transfers) walker, front-wheeled  -CT     Row Name 11/14/22 1450          Stand-Sit Transfer    Stand-Sit [place] (Transfers) moderate assist (50% patient effort);maximum assist (25% patient effort);2 person assist;verbal cues;nonverbal cues (demo/gesture)  -CT     Assistive Device (Stand-Sit Transfers) walker, front-wheeled  -CT     Row Name 11/14/22 1450          Toilet Transfer    Type (Toilet Transfer) sit-stand;stand-sit  -CT     [place] Level (Toilet Transfer) maximum assist (25% patient effort);2 person assist;verbal cues;nonverbal cues (demo/gesture)  -CT     Assistive Device (Toilet Transfer) commode, bedside without drop arms;walker, front-wheeled  -CT     Row Name 11/14/22 1450          Gait/Stairs (Locomotion)    [place] Level (Gait) maximum assist (25% patient effort);2 person assist;verbal cues;nonverbal cues (demo/gesture)  -CT     Assistive Device (Gait) walker, front-wheeled  -CT     Distance in Feet (Gait) 2, 3  2 to BSC, 3 to chair  -CT     Pattern (Gait) step-to  -CT     Deviations/Abnormal Patterns (Gait) gait speed decreased;festinating/shuffling;weight shifting decreased  -CT     Bilateral Gait Deviations weight shift ability decreased;forward flexed posture;knee buckling bilaterally  -CT     Row Name 11/14/22 1450          Motor Skills    Therapeutic Exercise --  BLE sitting ther ex: LAQ, AP, seated marches, hip abd/add, glue sets x 2 20 reps  -CT     Row Name 11/14/22 1450          Coping    Observed Emotional State calm;cooperative  -CT     Verbalized Emotional State acceptance  -CT     Family/Support Persons family  via facetime  -CT     Row Name 11/14/22 1450          Plan of Care Review    Plan of Care Reviewed With patient  -CT     Row Name 11/14/22 1450          Positioning and Restraints    Pre-Treatment Position in bed  -CT     Post Treatment Position chair  -CT     In Chair sitting;call light within reach;encouraged  to call for assist;notified [facility] [place]  -CT           User Key  (r) = Recorded By, (t) = Taken By, (c) = Cosigned By    Initials Name Provider Type    CT Bhargavi Vásquez PT Physical Therapist                Physical Therapy Education     Title: PT OT SLP Therapies (Done)     Topic: Physical Therapy (Done)     Point: Mobility training (Done)     Learning Progress Summary           Patient Acceptance, E,TB, VU by KK at 11/13/2022 1648    Acceptance, E,TB, VU by KG at 11/10/2022 1827    Acceptance, E,TB, VU by DM at 11/9/2022 1638    Acceptance, E,TB, VU by KK at 11/8/2022 1754    Acceptance, E,TB, VU by KK at 11/7/2022 1635    Acceptance, E,D, VU,NR by LL at 11/5/2022 1325    Acceptance, E, VU by EB at 11/4/2022 1448    Acceptance, E,TB, VU by KM at 11/4/2022 1101                   Point: Home exercise program (Done)     Learning Progress Summary           Patient Acceptance, E,TB, VU by KK at 11/13/2022 1648    Acceptance, E,TB, VU by KG at 11/10/2022 1827    Acceptance, E,TB, VU by DM at 11/9/2022 1638    Acceptance, E,TB, VU by KK at 11/8/2022 1754    Acceptance, E,TB, VU by KK at 11/7/2022 1635    Acceptance, E,D, VU,NR by LL at 11/5/2022 1325    Acceptance, E, VU by EB at 11/4/2022 1448    Acceptance, E,TB, VU by KM at 11/4/2022 1101                   Point: Body mechanics (Done)     Learning Progress Summary           Patient Acceptance, E,TB, VU by KK at 11/13/2022 1648    Acceptance, E,TB, VU by KG at 11/10/2022 1827    Acceptance, E,TB, VU by DM at 11/9/2022 1638    Acceptance, E,TB, VU by KK at 11/8/2022 1754    Acceptance, E,TB, VU by KK at 11/7/2022 1635    Acceptance, E,D, VU,NR by LL at 11/5/2022 1325    Acceptance, E, VU by EB at 11/4/2022 1448    Acceptance, E,TB, VU by KM at 11/4/2022 1101                   Point: Precautions (Done)     Learning Progress Summary           Patient Acceptance, E,TB, VU by KK at 11/13/2022 1648    Acceptance, E,TB, VU by KG at 11/10/2022 1827    Acceptance, E,TB, VU by DM at  11/9/2022 1638    Acceptance, E,TB, VU by KK at 11/8/2022 1754    Acceptance, E,TB, VU by KK at 11/7/2022 1635    Acceptance, E,D, VU,NR by LL at 11/5/2022 1325    Acceptance, E, VU by EB at 11/4/2022 1448    Acceptance, E,TB, VU by KM at 11/4/2022 1101                               User Key     Initials Effective Dates Name Provider Type Discipline    LL 05/02/16 -  Neda Garcia PTA Physical Therapist Assistant PT    DM 06/16/21 -  Moniz, Deborah, RN Registered Nurse Nurse    KM 05/24/22 -  Mao Vázquez PT Physical Therapist PT    EB 09/22/22 -  Cedric Oliver, RN Registered Nurse Nurse    KK 06/27/22 -  Chelsi Bolivar, RN Registered Nurse Nurse    KG 09/22/22 -  Verónica Rodriguez RN Registered Nurse Nurse              PT Recommendation and Plan  Anticipated Discharge Disposition (PT): home with 24/7 [facility], [facility] nursing [facility]  Planned Therapy Interventions (PT): balance training, bed mobility training, gait training, home exercise program, manual therapy techniques, motor coordination training, neuromuscular re-education, patient/family education, postural re-education, strengthening, transfer training  Therapy Frequency (PT): 5 times/wk (5-6 times/wk)  Progress Summary (PT)  Progress Toward Functional Goals (PT): progress toward functional goals is gradual  Plan of Care Reviewed With: patient  Progress: no change   Outcome Measures     Row Name 11/12/22 1434             How much help from another person do you currently need...    Turning from your back to your side while in flat bed without using bedrails? 2  -KP      Moving from lying on back to sitting on the side of a flat bed without bedrails? 2  -KP      Moving to and from a bed to a chair (including a wheelchair)? 2  -KP      Standing up from a chair using your arms (e.g., wheelchair, bedside chair)? 2  -KP      Climbing 3-5 steps with a railing? 1  -KP      To walk in hospital room? 2  -KP      AM-PAC 6 Clicks Score (PT) 11  -KP         How much  help from another is currently needed...    Putting on and taking off regular lower body clothing? 2  -KR      Bathing (including washing, rinsing, and drying) 2  -KR      Toileting (which includes using toilet bed pan or urinal) 2  -KR      Putting on and taking off regular upper body clothing 3  -KR      Taking care of personal grooming (such as brushing teeth) 3  -KR      Eating meals 4  -KR      AM-PAC 6 Clicks Score (OT) 16  -KR         Functional Assessment    Outcome Measure Options AM-PAC 6 Clicks Basic Mobility (PT)  -KP            User Key  (r) = Recorded By, (t) = Taken By, (c) = Cosigned By    Initials Name Provider Type    Andres Ayala OT Occupational Therapist    Martina Davis, PT Physical Therapist                 Time Calculation:    PT Charges     Row Name 11/14/22 6319             Time Calculation    Start Time 1305  -CT      PT Received On 11/14/22  -CT            User Key  (r) = Recorded By, (t) = Taken By, (c) = Cosigned By    Initials Name Provider Type    CT Bhargavi Vásquez, EDEL Physical Therapist              Therapy Charges for Today     Code Description Service Date Service Provider Modifiers Qty    36374140885 HC GAIT TRAINING EA 15 MIN 11/14/2022 Bhargavi Vásquez, PT GP 1    43263075211 HC PT THER PROC EA 15 MIN 11/14/2022 Bhargavi Vásquez, PT GP 1          PT G-Codes  Outcome Measure Options: AM-PAC 6 Clicks Basic Mobility (PT)  AM-PAC 6 Clicks Score (PT): 11  AM-PAC 6 Clicks Score (OT): 16    Bhargavi Vásquez PT  11/14/2022

## 2022-11-14 NOTE — THERAPY PROGRESS REPORT/RE-CERT
Patient Name: Tre Kinney  : 1932    MRN: [id number]                              Today's Date: 2022       Admit Date: 10/5/2022    Visit Dx: No diagnosis found.  Patient Active Problem List   Diagnosis   • Acid reflux   • Ankle arthralgia   • Cardiac conduction disorder   • Closed fracture of distal fibula   • Arteriosclerosis of coronary artery   • Fracture of distal end of tibia   • Elevated cholesterol   • BP (high blood pressure)   • Hypertrophic polyarthritis   • OP (osteoporosis)   • Right heart failure (HCC)   • Heart failure, chronic, right-sided (HCC)   • SOB (shortness of breath)   • Palpitations   • CHF (congestive heart failure) (HCC)   • Congestive heart failure (HCC)   • Precordial pain   • Chronic anticoagulation   • Hypokalemia   • Anemia, unspecified type   • Generalized weakness     Past Medical History:   Diagnosis Date   • Anemia    • Arthritis    • CHF (congestive heart failure) (HCC)    • Chronic kidney disease    • Coronary artery disease    • H/O blood clots    • History of transfusion     no reaction    • Hypertension      Past Surgical History:   Procedure Laterality Date   • ABDOMINAL SURGERY     • APPENDECTOMY     • CARDIAC SURGERY     •  SECTION     • COLONOSCOPY N/A 3/7/2018    Procedure: COLONOSCOPY;  Surgeon: Willie Gresham MD;  Location:  [facility];  Service:    • CORONARY ANGIOPLASTY WITH STENT PLACEMENT     • ENDOSCOPY N/A 3/6/2018    Procedure: ESOPHAGOGASTRODUODENOSCOPY;  Surgeon: Willie Gresham MD;  Location: [facility];  Service:    • PACEMAKER IMPLANTATION      left side    • SKIN BIOPSY     • TUBAL ABDOMINAL LIGATION        General Information     Row Name 22 1555 22 1538       OT Time and Intention    Document Type progress note/recertification  - therapy note (daily note)  -    Mode of Treatment individual therapy;occupational therapy  - individual therapy;occupational therapy  -    Row Name 22 1555 22  1538       General Information    Patient Profile Reviewed yes  -KP yes  -KP    Existing Precautions/Restrictions fall;other (see comments);TLSO  -KP fall;other (see comments);TLSO  -KP    Row Name 11/14/22 1555 11/14/22 1538       Cognition    Orientation Status (Cognition) oriented x 4  -KP oriented x 4  -KP    Row Name 11/14/22 1555 11/14/22 1538       Safety Issues, Functional Mobility    Impairments Affecting Function (Mobility) balance;strength;endurance/activity tolerance  -KP balance;strength;endurance/activity tolerance  -KP          User Key  (r) = Recorded By, (t) = Taken By, (c) = Cosigned By    Initials Name Provider Type    KP Linn Quevedo OT Occupational Therapist                 Mobility/ADL's     Row Name 11/14/22 1556 11/14/22 1538       Bed Mobility    Supine-Sit [place] (Bed Mobility) minimum assist (75% patient effort)  - minimum assist (75% patient effort)  -    Bed Mobility, Safety Issues -- decreased use of legs for bridging/pushing;decreased use of arms for pushing/pulling;impaired trunk control for bed mobility  -    Assistive Device (Bed Mobility) bed rails;draw sheet  -KP bed rails;draw sheet  -KP    Comment, (Bed Mobility) -- minimal encouragement initially for out of bed activity  -    Row Name 11/14/22 1556 11/14/22 1538       Transfers    Transfers toilet transfer  - toilet transfer  -    Row Name 11/14/22 1556 11/14/22 1538       Toilet Transfer    Type (Toilet Transfer) stand pivot/stand step  -KP stand pivot/stand step  -    [place] Level (Toilet Transfer) maximum assist (25% patient effort);2 person assist;verbal cues;nonverbal cues (demo/gesture)  -KP maximum assist (25% patient effort);2 person assist;verbal cues;nonverbal cues (demo/gesture)  -    Assistive Device (Toilet Transfer) commode, bedside without drop arms;walker, front-wheeled  -KP commode, bedside without drop arms;walker, front-wheeled  -KP    Comment, (Toilet Transfer) -- BSC placed at  45 degree angle from edge of bed; maximal assist with verbal cues with patient perseverating on use of bathroom prior to sitting on BSC  -[facility] Name 11/14/22 1556          Activities of Daily Living    BADL Assessment/Intervention bathing;upper body dressing;lower body dressing;grooming;feeding;toileting  -[facility] Name 11/14/22 1556          Bathing Assessment/Intervention    [place] Level (Bathing) bathing skills;maximum assist (25% patient effort)  -[facility] Name 11/14/22 1556          Upper Body Dressing Assessment/Training    [place] Level (Upper Body Dressing) upper body dressing skills;moderate assist (50% patient effort);maximum assist (25% patient effort)  -[facility] Name 11/14/22 1556          Lower Body Dressing Assessment/Training    [place] Level (Lower Body Dressing) lower body dressing skills;maximum assist (25% patient effort);dependent (less than 25% patient effort)  -[facility] Name 11/14/22 1556          Grooming Assessment/Training    [place] Level (Grooming) grooming skills;nail care;wash face, hands;set up  -[facility] Name 11/14/22 1556          Self-Feeding Assessment/Training    [place] Level (Feeding) feeding skills;set up  -     Position (Self-Feeding) supported sitting  -[facility] Name 11/14/22 1556 11/14/22 1538       Toileting Assessment/Training    [place] Level (Toileting) dependent (less than 25% patient effort);toileting skills  - dependent (less than 25% patient effort);toileting skills  -    Assistive Devices (Toileting) commode, bedside without drop arms  - --          User Key  (r) = Recorded By, (t) = Taken By, (c) = Cosigned By    Initials Name Provider Type     Linn Quevedo OT Occupational Therapist               Obj/Interventions     Row Name 11/14/22 1558 11/14/22 1540       Motor Skills    Motor Skills functional endurance  - --    Functional Endurance fair  -KP fair  -    Therapeutic Exercise -- --  BUE exercise  with moderate resistance theraband X20 reps X2 sets through functional movement patterns  -KP          User Key  (r) = Recorded By, (t) = Taken By, (c) = Cosigned By    Initials Name Provider Type    Linn Richardson OT Occupational Therapist               Goals/Plan     Row Name 11/14/22 1558          Bed Mobility Goal 1 (OT)    Progress/Outcomes (Bed Mobility Goal 1, OT) progress slower than expected;unable to make needed progress  -KP     Row Name 11/14/22 1558          Transfer Goal 1 (OT)    Progress/Outcome (Transfer Goal 1, OT) progress slower than expected;unable to make needed progress  -KP     Row Name 11/14/22 1558          Dressing Goal 1 (OT)    Progress/Outcome (Dressing Goal 1, OT) progress slower than expected;unable to make needed progress  -KP     Row Name 11/14/22 1558          Strength Goal 1 (OT)    Progress/Outcome (Strength Goal 1, OT) continuing progress toward goal  -KP     Row Name 11/14/22 1558           Activity Tolerance Goal 1 (OT)    Progress/Outcome (Activity Tolerance Goal 1, OT) continuing progress toward goal  -KP           User Key  (r) = Recorded By, (t) = Taken By, (c) = Cosigned By    Initials Name Provider Type    Linn Richardson OT Occupational Therapist               Clinical Impression     Row Name 11/14/22 1558 11/14/22 1541       Pain Assessment    Pretreatment Pain Rating 0/10 - no pain  -KP 0/10 - no pain  -KP    Posttreatment Pain Rating 0/10 - no pain  -KP 0/10 - no pain  -KP    Row Name 11/14/22 1541          Plan of Care Review    Outcome Evaluation Patient seen for OT treatment session. Initially lethargic, but improved alertness and participation with out of bed activity. Fair endurance. Continues to require maximal assist and cues with functional mobility and transfers. Patient continues to attempt to sit prior to full turn in front of surface.  -KP     Row Name 11/14/22 1558 11/14/22 1541       Therapy Plan Review/Discharge Plan (OT)    Anticipated  Discharge Disposition (OT) skilled nursing facility;home with 24/7 care;home with home health  - skilled nursing facility;home with 24/7 care;home with home health  -          User Key  (r) = Recorded By, (t) = Taken By, (c) = Cosigned By    Initials Name Provider Type    Linn Richardson OT Occupational Therapist               Outcome Measures     Row Name 11/14/22 1543          How much help from another is currently needed...    Putting on and taking off regular lower body clothing? 2  -KP     Bathing (including washing, rinsing, and drying) 2  -KP     Toileting (which includes using toilet bed pan or urinal) 2  -KP     Putting on and taking off regular upper body clothing 3  -KP     Taking care of personal grooming (such as brushing teeth) 3  -KP     Eating meals 4  -KP     AM-PAC 6 Clicks Score (OT) 16  -KP     Row Name 11/14/22 0870          How much help from another person do you currently need...    Turning from your back to your side while in flat bed without using bedrails? 2  -KK     Moving from lying on back to sitting on the side of a flat bed without bedrails? 2  -KK     Moving to and from a bed to a chair (including a wheelchair)? 2  -KK     Standing up from a chair using your arms (e.g., wheelchair, bedside chair)? 2  -KK     Climbing 3-5 steps with a railing? 1  -KK     To walk in hospital room? 2  -KK     AM-PAC 6 Clicks Score (PT) 11  -KK     Highest level of mobility 4 --> Transferred to chair/commode  -KK           User Key  (r) = Recorded By, (t) = Taken By, (c) = Cosigned By    Initials Name Provider Type    Linn Richardson OT Occupational Therapist    Chelsi Sen, RN Registered Nurse                  OT Recommendation and Plan     Plan of Care Review  Plan of Care Reviewed With: patient, son  Progress: improving  Outcome Evaluation: Patient seen for OT treatment session. Initially lethargic, but improved alertness and participation with out of bed activity. Fair endurance.  Continues to require maximal assist and cues with functional mobility and transfers. Patient continues to attempt to sit prior to full turn in front of surface.     Time Calculation:    Time Calculation- OT     Row Name 11/14/22 1543             Time Calculation- OT    OT Start Time 1305  -KP      OT Stop Time 1345  -      OT Time Calculation (min) 40 min  -      Total Timed Code Minutes- OT 16 minute(s)  -      OT Received On 11/14/22  -            User Key  (r) = Recorded By, (t) = Taken By, (c) = Cosigned By    Initials Name Provider Type    Linn Richardson OT Occupational Therapist              Therapy Charges for Today     Code Description Service Date Service Provider Modifiers Qty    63945554929 HC OT THER PROC EA 15 MIN 11/14/2022 Linn Quevedo OT GO 1               Linn Quevedo OT  11/14/2022

## 2022-11-14 NOTE — PLAN OF CARE
Goal Outcome Evaluation:  Plan of Care Reviewed With: patient        Progress: no change  Outcome Evaluation: Pt resting in bed during assessment. Pt C/O some pain, prn pain meds given per order. Vital signs stable, no acute changes at this time. Pt refused to get up tonight will offer assisstance to get up throughout the night. Will continue to monitor.

## 2022-11-14 NOTE — CASE MANAGEMENT/SOCIAL WORK
Discharge Planning Assessment   [place]     Patient Name: Tre Kinney  MRN: [id number]  Today's Date: 11/14/2022    Admit Date: 10/5/2022     Discharge Plan     Row Name 11/14/22 1102       Plan    Plan Pt is stable for discharge. SS was informed by  that expected discharge date is Wednesday, 11/16/22. SS notified pt and pt's son, Willian and they are agreeable for pt to be discharged home on 11/16/22. Pt's son to notify SS of discharge needs including home health and DME. SS to follow.    15:33: SS spoke to pt at bedside. SS discussed discharge options with pt, including home with home health services and nursing home placement. Pt voices that she is unable to afford nursing home placement and that her family is unable to care for her at home. Pt has not agreed for pre-authorization to be started by a nursing home. SS discussed discharge plan with . SS to follow.              Continued Care and Services - Admitted Since 10/5/2022     Destination     Service Provider Request Status Selected Services Address Phone Fax Patient Preferred    [facility] Accepted N/A [address] [facility] [address] [phone] --    [facility] Pending - Request Sent N/A [address] [place] [address] [phone] --    [facility] Declined  Does not have a secondary payor source N/A [address] [facility] [place] [address] [phone] --            Selected Continued Care - Prior Encounters Includes continued care and service providers with selected services from prior encounters from 7/7/2022 to 11/14/2022    Discharged on 10/5/2022 Admission date: 9/19/2022 - Discharge disposition: Swing Bed    Destination     Service Provider Selected Services Address Phone Fax Patient Preferred     [facility] [address] [facility] [place] [address] [phone] -- --                    LENORE Koch

## 2022-11-14 NOTE — THERAPY TREATMENT NOTE
Patient Name: Tre Kinney  : 1932    MRN: [id number]                              Today's Date: 2022       Admit Date: 10/5/2022    Visit Dx: No diagnosis found.  Patient Active Problem List   Diagnosis   • Acid reflux   • Ankle arthralgia   • Cardiac conduction disorder   • Closed fracture of distal fibula   • Arteriosclerosis of coronary artery   • Fracture of distal end of tibia   • Elevated cholesterol   • BP (high blood pressure)   • Hypertrophic polyarthritis   • OP (osteoporosis)   • Right heart failure (HCC)   • Heart failure, chronic, right-sided (HCC)   • SOB (shortness of breath)   • Palpitations   • CHF (congestive heart failure) (HCC)   • Congestive heart failure (HCC)   • Precordial pain   • Chronic anticoagulation   • Hypokalemia   • Anemia, unspecified type   • Generalized weakness     Past Medical History:   Diagnosis Date   • Anemia    • Arthritis    • CHF (congestive heart failure) (HCC)    • Chronic kidney disease    • Coronary artery disease    • H/O blood clots    • History of transfusion     no reaction    • Hypertension      Past Surgical History:   Procedure Laterality Date   • ABDOMINAL SURGERY     • APPENDECTOMY     • CARDIAC SURGERY     •  SECTION     • COLONOSCOPY N/A 3/7/2018    Procedure: COLONOSCOPY;  Surgeon: Willie Gresham MD;  Location:  [facility];  Service:    • CORONARY ANGIOPLASTY WITH STENT PLACEMENT     • ENDOSCOPY N/A 3/6/2018    Procedure: ESOPHAGOGASTRODUODENOSCOPY;  Surgeon: Willie Gresham MD;  Location:  [facility];  Service:    • PACEMAKER IMPLANTATION      left side    • SKIN BIOPSY     • TUBAL ABDOMINAL LIGATION        General Information     Row Name 22 1538          OT Time and Intention    Document Type therapy note (daily note)  -     Mode of Treatment individual therapy;occupational therapy  -     Row Name 22 1538          General Information    Patient Profile Reviewed yes  -     Existing  Precautions/Restrictions fall;other (see comments);TLSO  -     Row Name 11/14/22 1538          Cognition    Orientation Status (Cognition) oriented x 4  -     Row Name 11/14/22 1538          Safety Issues, Functional Mobility    Impairments Affecting Function (Mobility) balance;strength;endurance/activity tolerance  -           User Key  (r) = Recorded By, (t) = Taken By, (c) = Cosigned By    Initials Name Provider Type    Linn Richardson OT Occupational Therapist                 Mobility/ADL's     Row Name 11/14/22 1538          Bed Mobility    Supine-Sit [place] (Bed Mobility) minimum assist (75% patient effort)  -     Bed Mobility, Safety Issues decreased use of legs for bridging/pushing;decreased use of arms for pushing/pulling;impaired trunk control for bed mobility  -     Assistive Device (Bed Mobility) bed rails;draw sheet  -     Comment, (Bed Mobility) minimal encouragement initially for out of bed activity  -     Row Name 11/14/22 1538          Transfers    Transfers toilet transfer  -     Row Name 11/14/22 1538          Toilet Transfer    Type (Toilet Transfer) stand pivot/stand step  -     [place] Level (Toilet Transfer) maximum assist (25% patient effort);2 person assist;verbal cues;nonverbal cues (demo/gesture)  -     Assistive Device (Toilet Transfer) commode, bedside without drop arms;walker, front-wheeled  -     Comment, (Toilet Transfer) BSC placed at 45 degree angle from edge of bed; maximal assist with verbal cues with patient perseverating on use of bathroom prior to sitting on BSC  -     Row Name 11/14/22 1538          Toileting Assessment/Training    [place] Level (Toileting) dependent (less than 25% patient effort);toileting skills  -           User Key  (r) = Recorded By, (t) = Taken By, (c) = Cosigned By    Initials Name Provider Type    Linn Richardson OT Occupational Therapist               Obj/Interventions     Row Name 11/14/22 9380           Motor Skills    Functional Endurance fair  -KP     Therapeutic Exercise --  BUE exercise with moderate resistance theraband X20 reps X2 sets through functional movement patterns  -           User Key  (r) = Recorded By, (t) = Taken By, (c) = Cosigned By    Initials Name Provider Type    Linn Richardson OT Occupational Therapist               Goals/Plan    No documentation.                Clinical Impression     Row Name 11/14/22 1541          Pain Assessment    Pretreatment Pain Rating 0/10 - no pain  -KP     Posttreatment Pain Rating 0/10 - no pain  -     Row Name 11/14/22 1541          Plan of Care Review    Outcome Evaluation Patient seen for OT treatment session. Initially lethargic, but improved alertness and participation with out of bed activity. Fair endurance. Continues to require maximal assist and cues with functional mobility and transfers. Patient continues to attempt to sit prior to full turn in front of surface.  -     Row Name 11/14/22 1541          Therapy Plan Review/Discharge Plan (OT)    Anticipated Discharge Disposition (OT) skilled nursing facility;home with 24/7 care;home with home health  -           User Key  (r) = Recorded By, (t) = Taken By, (c) = Cosigned By    Initials Name Provider Type    Linn Richardson OT Occupational Therapist               Outcome Measures     Row Name 11/14/22 1543          How much help from another is currently needed...    Putting on and taking off regular lower body clothing? 2  -KP     Bathing (including washing, rinsing, and drying) 2  -KP     Toileting (which includes using toilet bed pan or urinal) 2  -KP     Putting on and taking off regular upper body clothing 3  -KP     Taking care of personal grooming (such as brushing teeth) 3  -KP     Eating meals 4  -KP     AM-PAC 6 Clicks Score (OT) 16  -     Row Name 11/14/22 1948          How much help from another person do you currently need...    Turning from your back to your side while in  flat bed without using bedrails? 2  -KK     Moving from lying on back to sitting on the side of a flat bed without bedrails? 2  -KK     Moving to and from a bed to a chair (including a wheelchair)? 2  -KK     Standing up from a chair using your arms (e.g., wheelchair, bedside chair)? 2  -KK     Climbing 3-5 steps with a railing? 1  -KK     To walk in hospital room? 2  -KK     AM-PAC 6 Clicks Score (PT) 11  -KK     Highest level of mobility 4 --> Transferred to chair/commode  -           User Key  (r) = Recorded By, (t) = Taken By, (c) = Cosigned By    Initials Name Provider Type    Linn Richardson OT Occupational Therapist    Chelsi Sen RN Registered Nurse                  OT Recommendation and Plan     Plan of Care Review  Plan of Care Reviewed With: patient, son  Progress: improving  Outcome Evaluation: Patient seen for OT treatment session. Initially lethargic, but improved alertness and participation with out of bed activity. Fair endurance. Continues to require maximal assist and cues with functional mobility and transfers. Patient continues to attempt to sit prior to full turn in front of surface.     Time Calculation:    Time Calculation- OT     Row Name 11/14/22 1543             Time Calculation- OT    OT Start Time 1305  -      OT Stop Time 1345  -      OT Time Calculation (min) 40 min  -      Total Timed Code Minutes- OT 16 minute(s)  -      OT Received On 11/14/22  -            User Key  (r) = Recorded By, (t) = Taken By, (c) = Cosigned By    Initials Name Provider Type    Linn Richardson OT Occupational Therapist              Therapy Charges for Today     Code Description Service Date Service Provider Modifiers Qty    43424129124  OT THER PROC EA 15 MIN 11/14/2022 Linn Quevedo OT GO 1               Linn Quevedo OT  11/14/2022

## 2022-11-15 PROCEDURE — 97116 GAIT TRAINING THERAPY: CPT

## 2022-11-15 PROCEDURE — 97110 THERAPEUTIC EXERCISES: CPT

## 2022-11-15 PROCEDURE — 97535 SELF CARE MNGMENT TRAINING: CPT

## 2022-11-15 PROCEDURE — 97530 THERAPEUTIC ACTIVITIES: CPT

## 2022-11-15 RX ORDER — POTASSIUM CHLORIDE 20 MEQ/1
40 TABLET, EXTENDED RELEASE ORAL EVERY 4 HOURS
Status: DISPENSED | OUTPATIENT
Start: 2022-11-15 | End: 2022-11-15

## 2022-11-15 RX ADMIN — Medication 10 ML: at 21:49

## 2022-11-15 RX ADMIN — Medication 1 TABLET: at 08:26

## 2022-11-15 RX ADMIN — METOPROLOL TARTRATE 12.5 MG: 25 TABLET, FILM COATED ORAL at 08:26

## 2022-11-15 RX ADMIN — TAMSULOSIN HYDROCHLORIDE 0.4 MG: 0.4 CAPSULE ORAL at 08:26

## 2022-11-15 RX ADMIN — HYDRALAZINE HYDROCHLORIDE 5 MG: 25 TABLET, FILM COATED ORAL at 06:05

## 2022-11-15 RX ADMIN — Medication 10 MG: at 21:45

## 2022-11-15 RX ADMIN — FUROSEMIDE 40 MG: 40 TABLET ORAL at 16:54

## 2022-11-15 RX ADMIN — METOPROLOL TARTRATE 25 MG: 25 TABLET, FILM COATED ORAL at 21:46

## 2022-11-15 RX ADMIN — FUROSEMIDE 40 MG: 40 TABLET ORAL at 08:26

## 2022-11-15 RX ADMIN — HYDRALAZINE HYDROCHLORIDE 5 MG: 25 TABLET, FILM COATED ORAL at 14:30

## 2022-11-15 RX ADMIN — Medication 10 ML: at 08:27

## 2022-11-15 RX ADMIN — PANTOPRAZOLE SODIUM 40 MG: 40 TABLET, DELAYED RELEASE ORAL at 06:05

## 2022-11-15 RX ADMIN — ESCITALOPRAM 10 MG: 10 TABLET, FILM COATED ORAL at 08:26

## 2022-11-15 RX ADMIN — APIXABAN 5 MG: 5 TABLET, FILM COATED ORAL at 21:45

## 2022-11-15 RX ADMIN — APIXABAN 5 MG: 5 TABLET, FILM COATED ORAL at 08:26

## 2022-11-15 RX ADMIN — Medication 10 ML: at 08:26

## 2022-11-15 RX ADMIN — GABAPENTIN 300 MG: 300 CAPSULE ORAL at 21:46

## 2022-11-15 RX ADMIN — HYDRALAZINE HYDROCHLORIDE 5 MG: 25 TABLET, FILM COATED ORAL at 23:07

## 2022-11-15 NOTE — THERAPY TREATMENT NOTE
Acute Care - Physical Therapy Treatment Note   [place]     Patient Name: Tre Kinney  : 1932  MRN: [id number]  Today's Date: 11/15/2022   Onset of Illness/Injury or Date of Surgery: 10/06/22  Visit Dx:   No diagnosis found.  Patient Active Problem List   Diagnosis   • Acid reflux   • Ankle arthralgia   • Cardiac conduction disorder   • Closed fracture of distal fibula   • Arteriosclerosis of coronary artery   • Fracture of distal end of tibia   • Elevated cholesterol   • BP (high blood pressure)   • Hypertrophic polyarthritis   • OP (osteoporosis)   • Right heart failure (HCC)   • Heart failure, chronic, right-sided (HCC)   • SOB (shortness of breath)   • Palpitations   • CHF (congestive heart failure) (HCC)   • Congestive heart failure (HCC)   • Precordial pain   • Chronic anticoagulation   • Hypokalemia   • Anemia, unspecified type   • Generalized weakness     Past Medical History:   Diagnosis Date   • Anemia    • Arthritis    • CHF (congestive heart failure) (HCC)    • Chronic kidney disease    • Coronary artery disease    • H/O blood clots    • History of transfusion     no reaction    • Hypertension      Past Surgical History:   Procedure Laterality Date   • ABDOMINAL SURGERY     • APPENDECTOMY     • CARDIAC SURGERY     •  SECTION     • COLONOSCOPY N/A 3/7/2018    Procedure: COLONOSCOPY;  Surgeon: Willie Gresham MD;  Location:  [facility];  Service:    • CORONARY ANGIOPLASTY WITH STENT PLACEMENT     • ENDOSCOPY N/A 3/6/2018    Procedure: ESOPHAGOGASTRODUODENOSCOPY;  Surgeon: Willie Gresham MD;  Location:  [facility];  Service:    • PACEMAKER IMPLANTATION      left side    • SKIN BIOPSY     • TUBAL ABDOMINAL LIGATION       PT Assessment (last 12 hours)     PT Evaluation and Treatment     Row Name 11/15/22 1020          Physical Therapy Time and Intention    Subjective Information complains of;weakness  -CT     Document Type therapy note (daily note)  -CT     Mode of Treatment  physical therapy  -CT     Patient Effort good  -CT     Comment Pt treatment session [id number]. No family present in room and pt declined when I asked if she wanted me to call her Son via [employer]. Pt up in chair. Pt performed seated ther ex in chair. Pt stood and ambulated 3 ft forward before trying to sit down without chair available and chair was pulled forward for pt to sit.  -CT     Row Name 11/15/22 1020          General Information    Patient Profile Reviewed yes  -CT     Existing Precautions/Restrictions fall;other (see comments);TLSO  swan; pt declines use of TLSO  -CT     Limitations/Impairments safety/cognitive  -CT     Row Name 11/15/22 1020          Cognition    Affect/Mental Status (Cognition) WFL  -CT     Orientation Status (Cognition) oriented x 4  -CT     Follows Commands (Cognition) WFL  -CT     Row Name 11/15/22 1020          Bed Mobility    Supine-Sit [place] (Bed Mobility) standby assist;verbal cues;nonverbal cues (demo/gesture)  -CT     Bed Mobility, Safety Issues decreased use of legs for bridging/pushing;decreased use of arms for pushing/pulling;impaired trunk control for bed mobility  -CT     Assistive Device (Bed Mobility) bed rails;draw sheet  -CT     Row Name 11/15/22 1020          Transfers    Transfers sit-stand transfer;stand-sit transfer  -CT     Row Name 11/15/22 1020          Sit-Stand Transfer    Sit-Stand [place] (Transfers) moderate assist (50% patient effort);maximum assist (25% patient effort);2 person assist;verbal cues;nonverbal cues (demo/gesture)  -CT     Assistive Device (Sit-Stand Transfers) walker, front-wheeled  -CT     Row Name 11/15/22 1020          Stand-Sit Transfer    Stand-Sit [place] (Transfers) maximum assist (25% patient effort);2 person assist;verbal cues;nonverbal cues (demo/gesture)  -CT     Assistive Device (Stand-Sit Transfers) walker, front-wheeled  -CT     Comment, (Stand-Sit Transfer) pt attempts to sit early requiring Max A x 2 to get  into chair safely  -CT     Row Name 11/15/22 1020          Gait/Stairs (Locomotion)    [place] Level (Gait) maximum assist (25% patient effort);2 person assist;verbal cues;nonverbal cues (demo/gesture)  -CT     Assistive Device (Gait) walker, front-wheeled  -CT     Distance in Feet (Gait) 3  -CT     Pattern (Gait) step-to  -CT     Deviations/Abnormal Patterns (Gait) gait speed decreased;festinating/shuffling;weight shifting decreased  -CT     Bilateral Gait Deviations weight shift ability decreased;forward flexed posture;knee buckling bilaterally  -CT     Comment, (Gait/Stairs) nursing assisted with transfer and ambulation this date  -CT     Row Name 11/15/22 1020          Motor Skills    Therapeutic Exercise --  BLE sitting ther ex in chair: AP x 2, LAQ, seated marches, hip abd/add, hip abd/add resisted with yellow TB, hamstring curls yellow TB, glue sets x 2 20 reps  -CT     Row Name 11/15/22 1020          Coping    Observed Emotional State calm;cooperative  -CT     Verbalized Emotional State acceptance  -CT     Family/Support Persons family  via facetime  -CT     Row Name 11/15/22 1020          Plan of Care Review    Plan of Care Reviewed With patient  -CT     Row Name 11/15/22 1020          Positioning and Restraints    Pre-Treatment Position sitting in chair/recliner  -CT     Post Treatment Position chair  -CT     In Chair sitting;call light within reach;encouraged to call for assist  -CT           User Key  (r) = Recorded By, (t) = Taken By, (c) = Cosigned By    Initials Name Provider Type    CT Bhargavi Vásquez PT Physical Therapist                Physical Therapy Education     Title: PT OT SLP Therapies (Done)     Topic: Physical Therapy (Done)     Point: Mobility training (Done)     Learning Progress Summary           Patient Acceptance, E,TB, VU by KK at 11/14/2022 1638    Acceptance, E,TB, VU by KK at 11/13/2022 1648    Acceptance, E,TB, VU by KG at 11/10/2022 1827    Acceptance, E,TB, VU by DM at  11/9/2022 1638    Acceptance, E,TB, VU by KK at 11/8/2022 1754    Acceptance, E,TB, VU by KK at 11/7/2022 1635    Acceptance, E,D, VU,NR by LL at 11/5/2022 1325    Acceptance, E, VU by EB at 11/4/2022 1448    Acceptance, E,TB, VU by KM at 11/4/2022 1101                   Point: Home exercise program (Done)     Learning Progress Summary           Patient Acceptance, E,TB, VU by KK at 11/14/2022 1638    Acceptance, E,TB, VU by KK at 11/13/2022 1648    Acceptance, E,TB, VU by KG at 11/10/2022 1827    Acceptance, E,TB, VU by DM at 11/9/2022 1638    Acceptance, E,TB, VU by KK at 11/8/2022 1754    Acceptance, E,TB, VU by KK at 11/7/2022 1635    Acceptance, E,D, VU,NR by LL at 11/5/2022 1325    Acceptance, E, VU by EB at 11/4/2022 1448    Acceptance, E,TB, VU by KM at 11/4/2022 1101                   Point: Body mechanics (Done)     Learning Progress Summary           Patient Acceptance, E,TB, VU by KK at 11/14/2022 1638    Acceptance, E,TB, VU by KK at 11/13/2022 1648    Acceptance, E,TB, VU by KG at 11/10/2022 1827    Acceptance, E,TB, VU by DM at 11/9/2022 1638    Acceptance, E,TB, VU by KK at 11/8/2022 1754    Acceptance, E,TB, VU by KK at 11/7/2022 1635    Acceptance, E,D, VU,NR by LL at 11/5/2022 1325    Acceptance, E, VU by EB at 11/4/2022 1448    Acceptance, E,TB, VU by KM at 11/4/2022 1101                   Point: Precautions (Done)     Learning Progress Summary           Patient Acceptance, E,TB, VU by KK at 11/14/2022 1638    Acceptance, E,TB, VU by KK at 11/13/2022 1648    Acceptance, E,TB, VU by KG at 11/10/2022 1827    Acceptance, E,TB, VU by DM at 11/9/2022 1638    Acceptance, E,TB, VU by KK at 11/8/2022 1754    Acceptance, E,TB, VU by KK at 11/7/2022 1635    Acceptance, E,D, VU,NR by LL at 11/5/2022 1325    Acceptance, E, VU by EB at 11/4/2022 1448    Acceptance, E,TB, VU by KM at 11/4/2022 1101                               User Key     Initials Effective Dates Name Provider Type Discipline    CELESTINO 05/02/16  -  Neda Garcia, NACHO Physical Therapist Assistant PT    DM 06/16/21 -  Moniz, Deborah, RN Registered Nurse Nurse    KM 05/24/22 -  Mao Vázquez, EDEL Physical Therapist PT    EB 09/22/22 -  Cedric Oliver, RN Registered Nurse Nurse    KK 06/27/22 -  Chelsi Bolivar, RN Registered Nurse Nurse    KG 09/22/22 -  Verónica Rodriguez, RN Registered Nurse Nurse              PT Recommendation and Plan  Anticipated Discharge Disposition (PT): home with 24/7 care, skilled nursing [facility]  Planned Therapy Interventions (PT): balance training, bed mobility training, gait training, home exercise program, manual therapy techniques, motor coordination training, neuromuscular re-education, patient/family education, postural re-education, strengthening, transfer training  Therapy Frequency (PT): 5 times/wk (5-6 times/wk)  Progress Summary (PT)  Progress Toward Functional Goals (PT): progress toward functional goals is gradual  Plan of Care Reviewed With: patient  Progress: no change   Outcome Measures     Row Name 11/12/22 3024             How much help from another person do you currently need...    Turning from your back to your side while in flat bed without using bedrails? 2  -KP      Moving from lying on back to sitting on the side of a flat bed without bedrails? 2  -KP      Moving to and from a bed to a chair (including a wheelchair)? 2  -KP      Standing up from a chair using your arms (e.g., wheelchair, bedside chair)? 2  -KP      Climbing 3-5 steps with a railing? 1  -KP      To walk in hospital room? 2  -KP      AM-PAC 6 Clicks Score (PT) 11  -KP         How much help from another is currently needed...    Putting on and taking off regular lower body clothing? 2  -KR      Bathing (including washing, rinsing, and drying) 2  -KR      Toileting (which includes using toilet bed pan or urinal) 2  -KR      Putting on and taking off regular upper body clothing 3  -KR      Taking care of personal grooming (such as brushing teeth) 3   -KR      Eating meals 4  -KR      AM-PAC 6 Clicks Score (OT) 16  -KR         Functional Assessment    Outcome Measure Options AM-PAC 6 Clicks Basic Mobility (PT)  -KP            User Key  (r) = Recorded By, (t) = Taken By, (c) = Cosigned By    Initials Name Provider Type    Andres Ayala, OT Occupational Therapist    Martina Davis, PT Physical Therapist                 Time Calculation:    PT Charges     Row Name 11/15/22 1125             Time Calculation    Start Time 1020  -CT      Stop Time 1059  -CT      Time Calculation (min) 39 min  -CT      PT Received On 11/15/22  -CT            User Key  (r) = Recorded By, (t) = Taken By, (c) = Cosigned By    Initials Name Provider Type    CT Bhargavi Vásquez, PT Physical Therapist              Therapy Charges for Today     Code Description Service Date Service Provider Modifiers Qty    04076536354 HC GAIT TRAINING EA 15 MIN 11/14/2022 Bhargavi Vásquez, PT GP 1    28346170794 HC PT THER PROC EA 15 MIN 11/14/2022 Bhargavi Vásquez, PT GP 1    44918206704 HC GAIT TRAINING EA 15 MIN 11/15/2022 Bhargavi Vásquez, PT GP 1    36309512145 HC PT THER PROC EA 15 MIN 11/15/2022 Bhargavi Vásquez, PT GP 1    11460067324 HC PT THERAPEUTIC ACT EA 15 MIN 11/15/2022 Bhargavi Vásquez, PT GP 1          PT G-Codes  Outcome Measure Options: AM-PAC 6 Clicks Basic Mobility (PT)  AM-PAC 6 Clicks Score (PT): 11  AM-PAC 6 Clicks Score (OT): 16    Bhargavi Vásquez PT  11/15/2022

## 2022-11-15 NOTE — PLAN OF CARE
Goal Outcome Evaluation:              Outcome Evaluation: Pt up in chair this morning. Rested well tonight. No complaints of pain or discomfort. VSS. Will continue POC.

## 2022-11-15 NOTE — PROGRESS NOTES
Ms. Kinney is our 88 yo F with hx CHF, chronic kidney disease, coronary artery disease s/p stenting, history of blood clots, chronic anticoagulation with Coumadin, SSS s/p pacemaker placement, chronic diastolic CHF, and iron deficiency anemia who initially presented here after a fall. Patient found to have right hip traumatic hematoma. Patient found to be anemic requiring 3 units of pRBCs. INR was high and reversed. Patient had DIANA that stabilized. Patient with chronic vertebral and rib fractures that were evaluated by orthopedic surgery. Patient also had volume overolad requiring diuresis during admission. Patient now admitted to swing bed for debility. Patient not seen today. Discussed care with patient's nurse, Rena, who had no concerns. She noted patient was resting comfortably. Labs, vitals, notes reviewed. Patient on hypertensive side, will conservatively increase metoprolol. Swing bed stay has been extended through 11/26.

## 2022-11-15 NOTE — THERAPY TREATMENT NOTE
Patient Name: Tre Kinney  : 1932    MRN: [id number]                              Today's Date: 11/15/2022       Admit Date: 10/5/2022    Visit Dx: No diagnosis found.  Patient Active Problem List   Diagnosis   • Acid reflux   • Ankle arthralgia   • Cardiac conduction disorder   • Closed fracture of distal fibula   • Arteriosclerosis of coronary artery   • Fracture of distal end of tibia   • Elevated cholesterol   • BP (high blood pressure)   • Hypertrophic polyarthritis   • OP (osteoporosis)   • Right heart failure (HCC)   • Heart failure, chronic, right-sided (HCC)   • SOB (shortness of breath)   • Palpitations   • CHF (congestive heart failure) (HCC)   • Congestive heart failure (HCC)   • Precordial pain   • Chronic anticoagulation   • Hypokalemia   • Anemia, unspecified type   • Generalized weakness     Past Medical History:   Diagnosis Date   • Anemia    • Arthritis    • CHF (congestive heart failure) (HCC)    • Chronic kidney disease    • Coronary artery disease    • H/O blood clots    • History of transfusion     no reaction    • Hypertension      Past Surgical History:   Procedure Laterality Date   • ABDOMINAL SURGERY     • APPENDECTOMY     • CARDIAC SURGERY     •  SECTION     • COLONOSCOPY N/A 3/7/2018    Procedure: COLONOSCOPY;  Surgeon: Willie Gresham MD;  Location:  [facility];  Service:    • CORONARY ANGIOPLASTY WITH STENT PLACEMENT     • ENDOSCOPY N/A 3/6/2018    Procedure: ESOPHAGOGASTRODUODENOSCOPY;  Surgeon: Willie Gresham MD;  Location:  [facility];  Service:    • PACEMAKER IMPLANTATION      left side    • SKIN BIOPSY     • TUBAL ABDOMINAL LIGATION        General Information     Row Name 11/15/22 1138          OT Time and Intention    Document Type therapy note (daily note)  -     Mode of Treatment individual therapy;occupational therapy  -     Row Name 11/15/22 1138          General Information    Patient Profile Reviewed yes  -     Existing  Precautions/Restrictions fall;other (see comments);TLSO  -[facility] Name 11/15/22 1138          Cognition    Orientation Status (Cognition) oriented x 4  -[facility] Name 11/15/22 1138          Safety Issues, Functional Mobility    Impairments Affecting Function (Mobility) balance;strength;endurance/activity tolerance  -           User Key  (r) = Recorded By, (t) = Taken By, (c) = Cosigned By    Initials Name Provider Type     Linn Quevedo, OT Occupational Therapist                 Mobility/ADL's     Row Name 11/15/22 1139          Transfers    Transfers sit-stand transfer;stand-sit transfer  -     Comment, (Transfers) Up in chair upon arrival. Sit to stands X2 attempts with maximal cues for technique and encouragement using rolling walker. She was unable to come to stand on first attempt, but successful on second X5 seconds.  -[facility] Name 11/15/22 1139          Sit-Stand Transfer    Sit-Stand [place] (Transfers) maximum assist (25% patient effort);1 person assist  -     Assistive Device (Sit-Stand Transfers) walker, front-wheeled  [facility] Name 11/15/22 1139          Stand-Sit Transfer    Stand-Sit [place] (Transfers) maximum assist (25% patient effort);1 person assist  -     Assistive Device (Stand-Sit Transfers) walker, front-wheeled  [facility] Name 11/15/22 1139          Activities of Daily Living    BADL Assessment/Intervention bathing;upper body dressing;lower body dressing;grooming  -[facility] Name 11/15/22 1139          Bathing Assessment/Intervention    [place] Level (Bathing) upper body;set up;lower body;minimum assist (75% patient effort)  -     Comment, (Bathing) sponge bath from recliner  -[facility] Name 11/15/22 1139          Upper Body Dressing Assessment/Training    [place] Level (Upper Body Dressing) upper body dressing skills;set up;standby assist  -[facility] Name 11/15/22 1139          Lower Body Dressing Assessment/Training    [place] Level  (Lower Body Dressing) lower body dressing skills;maximum assist (25% patient effort)  -     Row Name 11/15/22 1139          Grooming Assessment/Training    [place] Level (Grooming) grooming skills;hair care, combing/brushing;wash face, hands;oral care regimen;set up  -     Position (Grooming) supported sitting  -     Row Name 11/15/22 1139          Self-Feeding Assessment/Training    [place] Level (Feeding) feeding skills;set up  -     Position (Self-Feeding) supported sitting  -           User Key  (r) = Recorded By, (t) = Taken By, (c) = Cosigned By    Initials Name Provider Type    Linn Richardson OT Occupational Therapist               Obj/Interventions     Row Name 11/15/22 1140          Motor Skills    Functional Endurance fair  -           User Key  (r) = Recorded By, (t) = Taken By, (c) = Cosigned By    Initials Name Provider Type    Linn Richardson OT Occupational Therapist               Goals/Plan    No documentation.                Clinical Impression     Row Name 11/15/22 1141          Pain Assessment    Pretreatment Pain Rating 0/10 - no pain  -     Posttreatment Pain Rating 0/10 - no pain  -[facility] Name 11/15/22 1141          Plan of Care Review    Plan of Care Reviewed With patient  -     Progress no change  -     Outcome Evaluation Patient seen for OT treatment session (9:06 to 9:45). She declined calling son due to patient seen for self care tasks. She performed ADLs from recliner as documented. She is able to perform upper body tasks with set-up, but requires maximal assist with ADLs requiring standing. Continue plan of care.  -     Row Name 11/15/22 1141          Therapy Plan Review/Discharge Plan (OT)    Anticipated Discharge Disposition (OT) skilled nursing facility;home with 24/7 care;home with home health  -     Row Name 11/15/22 1141          Positioning and Restraints    Pre-Treatment Position sitting in chair/recliner  -     Post Treatment Position  chair  -KP     In Chair call light within reach  -           User Key  (r) = Recorded By, (t) = Taken By, (c) = Cosigned By    Initials Name Provider Type    Linn Richardson OT Occupational Therapist               Outcome Measures     Row Name 11/15/22 1149          How much help from another is currently needed...    Putting on and taking off regular lower body clothing? 2  -KP     Bathing (including washing, rinsing, and drying) 2  -KP     Toileting (which includes using toilet bed pan or urinal) 2  -KP     Putting on and taking off regular upper body clothing 3  -KP     Taking care of personal grooming (such as brushing teeth) 3  -KP     Eating meals 4  -KP     AM-PAC 6 Clicks Score (OT) 16  -KP     Row Name 11/15/22 0826          How much help from another person do you currently need...    Turning from your back to your side while in flat bed without using bedrails? 2  -LG     Moving from lying on back to sitting on the side of a flat bed without bedrails? 2  -LG     Moving to and from a bed to a chair (including a wheelchair)? 2  -LG     Standing up from a chair using your arms (e.g., wheelchair, bedside chair)? 2  -LG     Climbing 3-5 steps with a railing? 1  -LG     To walk in hospital room? 2  -LG     AM-PAC 6 Clicks Score (PT) 11  -LG     Highest level of mobility 4 --> Transferred to chair/commode  -LG           User Key  (r) = Recorded By, (t) = Taken By, (c) = Cosigned By    Initials Name Provider Type    Rena Mckeon RN Registered Nurse    Linn Richardson OT Occupational Therapist                  OT Recommendation and Plan     Plan of Care Review  Plan of Care Reviewed With: patient  Progress: no change  Outcome Evaluation: Patient seen for OT treatment session (9:06 to 9:45). She declined calling son due to patient seen for self care tasks. She performed ADLs from recliner as documented. She is able to perform upper body tasks with set-up, but requires maximal assist with ADLs  requiring standing. Continue plan of care.     Time Calculation:    Time Calculation- OT     Row Name 11/15/22 1144             Time Calculation- OT    OT Start Time 0906  -      OT Stop Time 0945  -      OT Time Calculation (min) 39 min  -      Total Timed Code Minutes- OT 39 minute(s)  -      OT Received On 11/15/22  -            User Key  (r) = Recorded By, (t) = Taken By, (c) = Cosigned By    Initials Name Provider Type     Linn Quevedo OT Occupational Therapist              Therapy Charges for Today     Code Description Service Date Service Provider Modifiers Qty    59610639193 HC OT THER PROC EA 15 MIN 11/14/2022 Linn Quevedo OT GO 1    80469549319 HC OT THERAPEUTIC ACT EA 15 MIN 11/15/2022 Linn Quevedo OT GO 1    18636533291 HC OT SELF CARE/MGMT/TRAIN EA 15 MIN 11/15/2022 Linn Quevedo OT GO 2               Linn Quevedo OT  11/15/2022

## 2022-11-15 NOTE — PLAN OF CARE
Goal Outcome Evaluation:           Progress: no change  Outcome Evaluation: Patient has been able to work with PT and OT this shift. Patient has ambulated with nursing staff to the chair x2 so far this shift, patient has voices no complaints or concerns

## 2022-11-15 NOTE — PROGRESS NOTES
[facility] HOSPITALIST PROGRESS NOTE    Subjective     History:   Tre Kinney is a 89 y.o. female admitted on 10/5/2022 secondary to <principal problem not specified>     Procedures: None    CC: Follow up debility    Patient seen and examined with SONAM Gagnon. Awake and alert. Continues to report generalized weakness. No reported CP, dyspnea or palpitations. No reported nausea or vomiting. No acute events overnight per RN.     History taken from: patient, chart, and RN.      Objective     Vital Signs  Temp:  [98 °F (36.7 °C)-98.2 °F (36.8 °C)] 98.2 °F (36.8 °C)  Heart Rate:  [] 82  Resp:  [16-18] 18  BP: (150-187)/(59-81) 150/59    Intake/Output Summary (Last 24 hours) at 11/14/2022 1930  Last data filed at 11/14/2022 1645  Gross per 24 hour   Intake 240 ml   Output 575 ml   Net -335 ml         Physical Exam:  General:    Awake, alert, in no acute distress, chronically ill appearing   Heart:      Normal S1 and S2. Regular rate and rhythm. No significant murmur, rubs or gallops appreciated.   Lungs:     Respirations regular, even and unlabored. Lungs clear to auscultation B/L. No wheezes, rales or rhonchi.   Abdomen:   Soft and nontender. No guarding, rebound tenderness or  organomegaly noted. Bowel sounds present x 4.   Extremities:  No lower extremity edema. Moves UE and LE equally B/L.     Results Review:    Results from last 7 days   Lab Units 11/13/22  0116 11/08/22  0321   WBC 10*3/mm3 7.47 6.64   HEMOGLOBIN g/dL 9.7* 9.6*   PLATELETS 10*3/mm3 238 227     Results from last 7 days   Lab Units 11/13/22  0116 11/08/22  0321   SODIUM mmol/L 140 138   POTASSIUM mmol/L 3.5 3.6   CHLORIDE mmol/L 102 100   CO2 mmol/L 26.6 27.2   BUN mg/dL 27* 29*   CREATININE mg/dL 1.85* 1.74*   CALCIUM mg/dL 8.7 8.8   GLUCOSE mg/dL 99 93                       Imaging Results (Last 24 Hours)     ** No results found for the last 24 hours. **            Medications:  apixaban, 5 mg, Oral, Q12H  escitalopram, 10 mg,  Oral, Daily  furosemide, 40 mg, Oral, BID  gabapentin, 300 mg, Oral, Nightly  hydrALAZINE, 5 mg, Oral, Q8H  melatonin, 10 mg, Oral, Nightly  metoprolol tartrate, 12.5 mg, Oral, Q12H  multivitamin, 1 tablet, Oral, Daily  pantoprazole, 40 mg, Oral, Q AM  sodium chloride, 10 mL, Intravenous, Q12H  sodium chloride, 10 mL, Intravenous, Q12H  tamsulosin, 0.4 mg, Oral, Daily      Pharmacy Consult,             Assessment & Plan   Generalized weakness/debility  Diffuse LLE DVT  DIANA on CKD IV  Acute blood loss anemia  Traumatic right gluteal hematoma  Mild hypokalemia   Paroxysmal Afib  Hx of SSS s/p pacemaker placement  Essential HTN  Reported hx of DVT's   Anxiety/depression  Advanced age    Plan  BP elevated briefly today but improved on repeat after receiving PO hydralazine. Remains weak but clinically stable. Tolerating Eliquis with no reported signs of bleeding. Cont current medication regimen. Cont PT/OT. Family's grievance with insurance company pending. Discussed with CM and swing bed coordinator today who has discussed discharge planning with patient and her family. Will tentatively plan for discharge home with family on 11/16/22. Pt is in agreement with this plan.     Disposition: Awaiting response from insurance company regarding grievance filed by family. Planning for discharge home with family on 11/16.         Mustapha Loredo, DO  11/14/22  19:30 EST

## 2022-11-15 NOTE — CASE MANAGEMENT/SOCIAL WORK
Discharge Planning Assessment  [facility]     Patient Name: Tre Kinney  MRN: [id number]  Today's Date: 11/15/2022    Admit Date: 10/5/2022       Discharge Plan     Row Name 11/15/22 1457       Plan    Plan Pt's insurance has approved swing bed through 11/26/22. Swing bed RN to submit updated clinicals to insurance on 11/23/22 for insurance review.   to notify pt's son, Willian. SS to follow.              Continued Care and Services - Admitted Since 10/5/2022     Destination     Service Provider Request Status Selected Services Address Phone Fax Patient Preferred    [facility] Accepted N/A [address] [place] [address] [phone] --    [facility] Pending - Request Sent N/A [address] [place] [address] [phone] --    [facility] Declined  Does not have a secondary payor source N/A [address] [place] [id number] [phone] --            Selected Continued Care - Prior Encounters Includes continued care and service providers with selected services from prior encounters from 7/7/2022 to 11/15/2022    Discharged on 10/5/2022 Admission date: 9/19/2022 - Discharge disposition: Swing Bed    Destination     Service Provider Selected Services Address Phone Fax Patient Preferred    [facility] BED Skilled Nursing [address] [facility] [address] [phone] -- --                    LENORE Koch

## 2022-11-16 PROCEDURE — 97116 GAIT TRAINING THERAPY: CPT

## 2022-11-16 PROCEDURE — 97530 THERAPEUTIC ACTIVITIES: CPT

## 2022-11-16 PROCEDURE — 97110 THERAPEUTIC EXERCISES: CPT

## 2022-11-16 RX ADMIN — Medication 10 ML: at 08:46

## 2022-11-16 RX ADMIN — GABAPENTIN 300 MG: 300 CAPSULE ORAL at 20:34

## 2022-11-16 RX ADMIN — FUROSEMIDE 40 MG: 40 TABLET ORAL at 17:17

## 2022-11-16 RX ADMIN — Medication 10 ML: at 20:36

## 2022-11-16 RX ADMIN — METOPROLOL TARTRATE 25 MG: 25 TABLET, FILM COATED ORAL at 08:27

## 2022-11-16 RX ADMIN — APIXABAN 5 MG: 5 TABLET, FILM COATED ORAL at 20:34

## 2022-11-16 RX ADMIN — METOPROLOL TARTRATE 25 MG: 25 TABLET, FILM COATED ORAL at 20:34

## 2022-11-16 RX ADMIN — APIXABAN 5 MG: 5 TABLET, FILM COATED ORAL at 08:28

## 2022-11-16 RX ADMIN — TAMSULOSIN HYDROCHLORIDE 0.4 MG: 0.4 CAPSULE ORAL at 08:27

## 2022-11-16 RX ADMIN — HYDRALAZINE HYDROCHLORIDE 5 MG: 25 TABLET, FILM COATED ORAL at 06:48

## 2022-11-16 RX ADMIN — HYDRALAZINE HYDROCHLORIDE 5 MG: 25 TABLET, FILM COATED ORAL at 21:55

## 2022-11-16 RX ADMIN — Medication 1 TABLET: at 08:27

## 2022-11-16 RX ADMIN — PANTOPRAZOLE SODIUM 40 MG: 40 TABLET, DELAYED RELEASE ORAL at 06:48

## 2022-11-16 RX ADMIN — Medication 10 MG: at 20:35

## 2022-11-16 RX ADMIN — FUROSEMIDE 40 MG: 40 TABLET ORAL at 08:28

## 2022-11-16 RX ADMIN — ESCITALOPRAM 10 MG: 10 TABLET, FILM COATED ORAL at 08:28

## 2022-11-16 RX ADMIN — HYDRALAZINE HYDROCHLORIDE 5 MG: 25 TABLET, FILM COATED ORAL at 13:28

## 2022-11-16 NOTE — PLAN OF CARE
Goal Outcome Evaluation:  Plan of Care Reviewed With: (P) patient           Outcome Evaluation: (P) Pt resting in chair at this time. No complaints of pain. Pt worked with PT and OT. No change will continue with plan of care.

## 2022-11-16 NOTE — THERAPY TREATMENT NOTE
Acute Care - Occupational Therapy Treatment Note   [place]     Patient Name: Tre Kinney  : 1932  MRN: [id number]  Today's Date: 2022  Onset of Illness/Injury or Date of Surgery: 10/06/22     Referring Physician: Janneth    Admit Date: 10/5/2022     No diagnosis found.  Patient Active Problem List   Diagnosis   • Acid reflux   • Ankle arthralgia   • Cardiac conduction disorder   • Closed fracture of distal fibula   • Arteriosclerosis of coronary artery   • Fracture of distal end of tibia   • Elevated cholesterol   • BP (high blood pressure)   • Hypertrophic polyarthritis   • OP (osteoporosis)   • Right heart failure (HCC)   • Heart failure, chronic, right-sided (HCC)   • SOB (shortness of breath)   • Palpitations   • CHF (congestive heart failure) (HCC)   • Congestive heart failure (HCC)   • Precordial pain   • Chronic anticoagulation   • Hypokalemia   • Anemia, unspecified type   • Generalized weakness     Past Medical History:   Diagnosis Date   • Anemia    • Arthritis    • CHF (congestive heart failure) ([facility])    • Chronic kidney disease    • Coronary artery disease    • H/O blood clots    • History of transfusion     no reaction    • Hypertension      Past Surgical History:   Procedure Laterality Date   • ABDOMINAL SURGERY     • APPENDECTOMY     • CARDIAC SURGERY     •  SECTION     • COLONOSCOPY N/A 3/7/2018    Procedure: COLONOSCOPY;  Surgeon: Willie Gresham MD;  Location:  [facility];  Service:    • CORONARY ANGIOPLASTY WITH STENT PLACEMENT     • ENDOSCOPY N/A 3/6/2018    Procedure: ESOPHAGOGASTRODUODENOSCOPY;  Surgeon: Willie Gresham MD;  Location:  [facility];  Service:    • PACEMAKER IMPLANTATION      left side    • SKIN BIOPSY     • TUBAL ABDOMINAL LIGATION           OT ASSESSMENT FLOWSHEET (last 12 hours)     OT Evaluation and Treatment     Row Name 22 1045                   OT Time and Intention    Subjective Information complains of;weakness;fatigue  -LM         Document Type therapy note (daily note)  -LM        Mode of Treatment occupational therapy  -LM        Patient Effort good  -LM        Comment Patient seen this date for light BUE therex, ADL retraining, and fxl activity tolerance from 10:45-11:10.  Attempted to Facetime sonWillian x 2 with no answer.  Patient completed bue arom/therex while seated in bedside recliner.  Yellow tband therex, light bue towel therex.  Patient also able to doff socks while seated and doff slip on shoes with supervision this date.  Frequent rest breaks.  OT assisted PT prior to OT session for fxl mobility.  -LM           General Information    Existing Precautions/Restrictions fall  -LM           Cognition    Affect/Mental Status (Cognition) WFL  -LM        Orientation Status (Cognition) oriented x 4  -LM           Positioning and Restraints    Post Treatment Position chair  -LM        In Chair encouraged to call for assist;call light within reach  -LM        On BS commode exit alarm on  -LM              User Key  (r) = Recorded By, (t) = Taken By, (c) = Cosigned By    Initials Name Effective Dates    LM Татьяна Bah OT 06/16/21 -                        OT Recommendation and Plan           Outcome Measures     Row Name 11/16/22 1110             How much help from another is currently needed...    Putting on and taking off regular lower body clothing? 2  -LM      Bathing (including washing, rinsing, and drying) 2  -LM      Toileting (which includes using toilet bed pan or urinal) 2  -LM      Putting on and taking off regular upper body clothing 3  -LM      Taking care of personal grooming (such as brushing teeth) 3  -LM      Eating meals 4  -LM      AM-PAC 6 Clicks Score (OT) 16  -LM            User Key  (r) = Recorded By, (t) = Taken By, (c) = Cosigned By    Initials Name Provider Type    LM Татьяна Bah, OT Occupational Therapist                Time Calculation:    Time Calculation- OT     Row Name 11/16/22 1231             Time  Calculation- OT    Total Timed Code Minutes- OT 25 minute(s)  -SHAHBAZ            User Key  (r) = Recorded By, (t) = Taken By, (c) = Cosigned By    Initials Name Provider Type     Татьяна Bah OT Occupational Therapist              Therapy Charges for Today     Code Description Service Date Service Provider Modifiers Qty    13803970933  OT THER PROC EA 15 MIN 11/16/2022 Татьяна Bah OT GO 2               Татьяна Bah OT  11/16/2022

## 2022-11-16 NOTE — CASE MANAGEMENT/SOCIAL WORK
Discharge Planning Assessment   [place]     Patient Name: Tre Kinney  MRN: [id number]  Today's Date: 11/16/2022    Admit Date: 10/5/2022       Discharge Plan     Row Name 11/16/22 1656       Plan    Plan 12:59: Swing bed RN states that he received confirmation from [employer] that clinical updates for  will be due 11/28/2022. Swing bed RN will submit clinical updates on that date. SS to follow.    16:56: SS was informed by PT that pt voiced plans to go to a nursing home at discharge. SS spoke to pt and pt voices agreement for clinicals to be sent to [facility]. SS contacted [facility] per Chana and sent clinicals for review. [facility] will have to submit clinicals to insurance for a pre-authorization. SS to follow.              Continued Care and Services - Admitted Since 10/5/2022     Destination     Service Provider Request Status Selected Services Address Phone Fax Patient Preferred    [facility] Accepted N/A [address] [facility] [address] [phone] --    [facility] Request Sent N/A [address] [place] [address] [phone] --    [facility] Declined  Does not have a secondary payor source N/A [address] [facility] [place] [address] [phone] --            Selected Continued Care - Prior Encounters Includes continued care and service providers with selected services from prior encounters from 7/7/2022 to 11/16/2022    Discharged on 10/5/2022 Admission date: 9/19/2022 - Discharge disposition: Swing Bed    Destination     Service Provider Selected Services Address Phone Fax Patient Preferred    [facility] SWING BED Skilled Nursing [address] [facility] [place] [address] [phone] -- --                    LENORE Koch

## 2022-11-16 NOTE — THERAPY TREATMENT NOTE
Acute Care - Physical Therapy Treatment Note   [place]     Patient Name: Tre Kinney  : 1932  MRN: [id number]  Today's Date: 2022   Onset of Illness/Injury or Date of Surgery: 10/06/22  Visit Dx:   No diagnosis found.  Patient Active Problem List   Diagnosis   • Acid reflux   • Ankle arthralgia   • Cardiac conduction disorder   • Closed fracture of distal fibula   • Arteriosclerosis of coronary artery   • Fracture of distal end of tibia   • Elevated cholesterol   • BP (high blood pressure)   • Hypertrophic polyarthritis   • OP (osteoporosis)   • Right heart failure (HCC)   • Heart failure, chronic, right-sided (HCC)   • SOB (shortness of breath)   • Palpitations   • CHF (congestive heart failure) (HCC)   • Congestive heart failure (HCC)   • Precordial pain   • Chronic anticoagulation   • Hypokalemia   • Anemia, unspecified type   • Generalized weakness     Past Medical History:   Diagnosis Date   • Anemia    • Arthritis    • CHF (congestive heart failure) (HCC)    • Chronic kidney disease    • Coronary artery disease    • H/O blood clots    • History of transfusion     no reaction    • Hypertension      Past Surgical History:   Procedure Laterality Date   • ABDOMINAL SURGERY     • APPENDECTOMY     • CARDIAC SURGERY     •  SECTION     • COLONOSCOPY N/A 3/7/2018    Procedure: COLONOSCOPY;  Surgeon: Willie Gresham MD;  Location:  [facility];  Service:    • CORONARY ANGIOPLASTY WITH STENT PLACEMENT     • ENDOSCOPY N/A 3/6/2018    Procedure: ESOPHAGOGASTRODUODENOSCOPY;  Surgeon: Willie Gresham MD;  Location:  [facility];  Service:    • PACEMAKER IMPLANTATION      left side    • SKIN BIOPSY     • TUBAL ABDOMINAL LIGATION       PT Assessment (last 12 hours)     PT Evaluation and Treatment     Row Name 22 1055          Physical Therapy Time and Intention    Subjective Information complains of;weakness  -CT     Document Type therapy note (daily note)  -CT     Mode of Treatment  physical therapy  -CT     Patient Effort good  -CT     Comment Pt treatment session [id number]. Asked pt if she wanted therapist to call her son and she said yes. Attempted to call pts son via facetime with no answer. OT assisted with ambualtion and transfer. Pt in bed prior to therapy. Pt ambulated 5 ft and transfered to chair. Pt tearful during session about legs being too weak for her to ambulate further distance. Pt performed seated ther ex in chair.  -CT     Row Name 11/16/22 1055          General Information    Patient Profile Reviewed yes  -CT     Existing Precautions/Restrictions fall;other (see comments);TLSO  swan; pt declines use of TLSO  -CT     Limitations/Impairments safety/cognitive  -CT     Row Name 11/16/22 1055          Cognition    Affect/Mental Status (Cognition) WFL  -CT     Orientation Status (Cognition) oriented x 4  -CT     Follows Commands (Cognition) WFL  -CT     Row Name 11/16/22 1055          Bed Mobility    Supine-Sit [place] (Bed Mobility) standby assist;verbal cues;nonverbal cues (demo/gesture)  -CT     Bed Mobility, Safety Issues decreased use of legs for bridging/pushing;decreased use of arms for pushing/pulling;impaired trunk control for bed mobility  -CT     Assistive Device (Bed Mobility) bed rails;draw sheet  -CT     Row Name 11/16/22 1055          Transfers    Transfers sit-stand transfer;stand-sit transfer  -CT     Row Name 11/16/22 1055          Sit-Stand Transfer    Sit-Stand [place] (Transfers) moderate assist (50% patient effort);maximum assist (25% patient effort);2 person assist;verbal cues;nonverbal cues (demo/gesture)  -CT     Assistive Device (Sit-Stand Transfers) walker, front-wheeled  -CT     Row Name 11/16/22 1051          Stand-Sit Transfer    Stand-Sit [place] (Transfers) maximum assist (25% patient effort);2 person assist;verbal cues;nonverbal cues (demo/gesture)  -CT     Assistive Device (Stand-Sit Transfers) walker, front-wheeled  -CT     Row  Name 11/16/22 1055          Gait/Stairs (Locomotion)    Martin Level (Gait) maximum assist (25% patient effort);2 person assist;verbal cues;nonverbal cues (demo/gesture)  -CT     Assistive Device (Gait) walker, front-wheeled  -CT     Distance in Feet (Gait) 5  -CT     Pattern (Gait) step-to  -CT     Deviations/Abnormal Patterns (Gait) gait speed decreased;festinating/shuffling;weight shifting decreased  -CT     Bilateral Gait Deviations weight shift ability decreased;forward flexed posture;knee buckling bilaterally  -CT     Comment, (Gait/Stairs) pts legs give out and she attempts to sit early before getting to chair  -CT     Row Name 11/16/22 1055          Motor Skills    Therapeutic Exercise --  BLE sitting ther ex: AP x 2, LAQ, seated marches, hip abd/add, hip abd/add resisted with yellow TB, hamstring curls yellow TB, glue sets x 2 20 reps  -CT     Row Name 11/16/22 1055          Coping    Observed Emotional State calm;cooperative  -CT     Verbalized Emotional State acceptance  -CT     Family/Support Persons family  via facetime  -CT     Row Name 11/16/22 1055          Plan of Care Review    Plan of Care Reviewed With patient  -CT     Row Name 11/16/22 1055          Positioning and Restraints    Pre-Treatment Position in bed  -CT     Post Treatment Position chair  -CT     In Chair sitting;call light within reach;encouraged to call for assist;notified nsg;with OT  -CT           User Key  (r) = Recorded By, (t) = Taken By, (c) = Cosigned By    Initials Name Provider Type    CT Bhargavi Vásquez PT Physical Therapist                Physical Therapy Education     Title: PT OT SLP Therapies (Done)     Topic: Physical Therapy (Done)     Point: Mobility training (Done)     Learning Progress Summary           Patient Acceptance, E,TB, VU by KK at 11/14/2022 1638    Acceptance, E,TB, VU by KK at 11/13/2022 1648    Acceptance, E,TB, VU by KG at 11/10/2022 1827    Acceptance, E,TB, VU by DM at 11/9/2022 1638     Acceptance, E,TB, VU by KK at 11/8/2022 1754    Acceptance, E,TB, VU by KK at 11/7/2022 1635    Acceptance, E,D, VU,NR by LL at 11/5/2022 1325    Acceptance, E, VU by EB at 11/4/2022 1448    Acceptance, E,TB, VU by KM at 11/4/2022 1101                   Point: Home exercise program (Done)     Learning Progress Summary           Patient Acceptance, E,TB, VU by KK at 11/14/2022 1638    Acceptance, E,TB, VU by KK at 11/13/2022 1648    Acceptance, E,TB, VU by KG at 11/10/2022 1827    Acceptance, E,TB, VU by DM at 11/9/2022 1638    Acceptance, E,TB, VU by KK at 11/8/2022 1754    Acceptance, E,TB, VU by KK at 11/7/2022 1635    Acceptance, E,D, VU,NR by LL at 11/5/2022 1325    Acceptance, E, VU by EB at 11/4/2022 1448    Acceptance, E,TB, VU by KM at 11/4/2022 1101                   Point: Body mechanics (Done)     Learning Progress Summary           Patient Acceptance, E,TB, VU by KK at 11/14/2022 1638    Acceptance, E,TB, VU by KK at 11/13/2022 1648    Acceptance, E,TB, VU by KG at 11/10/2022 1827    Acceptance, E,TB, VU by DM at 11/9/2022 1638    Acceptance, E,TB, VU by KK at 11/8/2022 1754    Acceptance, E,TB, VU by KK at 11/7/2022 1635    Acceptance, E,D, VU,NR by LL at 11/5/2022 1325    Acceptance, E, VU by EB at 11/4/2022 1448    Acceptance, E,TB, VU by KM at 11/4/2022 1101                   Point: Precautions (Done)     Learning Progress Summary           Patient Acceptance, E,TB, VU by KK at 11/14/2022 1638    Acceptance, E,TB, VU by KK at 11/13/2022 1648    Acceptance, E,TB, VU by KG at 11/10/2022 1827    Acceptance, E,TB, VU by DM at 11/9/2022 1638    Acceptance, E,TB, VU by KK at 11/8/2022 1754    Acceptance, E,TB, VU by KK at 11/7/2022 1635    Acceptance, E,D, VU,NR by LL at 11/5/2022 1325    Acceptance, E, VU by EB at 11/4/2022 1448    Acceptance, E,TB, VU by KM at 11/4/2022 1101                               User Key     Initials Effective Dates Name Provider Type Discipline    CELESTINO 05/02/16 -  Jose,  Neda, PTA Physical Therapist Assistant PT    DM 06/16/21 -  Moniz, Deborah, RN Registered Nurse Nurse    KM 05/24/22 -  Mao Vázquez, EDEL Physical Therapist PT    EB 09/22/22 -  Cedric Oliver, RN Registered Nurse Nurse    KK 06/27/22 -  Chelsi Bolivar, RN Registered Nurse Nurse    KG 09/22/22 -  Verónica Rodriguez, RN Registered Nurse Nurse              PT Recommendation and Plan  Anticipated Discharge Disposition (PT): home with 24/7 [facility], [facility] nursing [facility]  Planned Therapy Interventions (PT): balance training, bed mobility training, gait training, home exercise program, manual therapy techniques, motor coordination training, neuromuscular re-education, patient/family education, postural re-education, strengthening, transfer training  Therapy Frequency (PT): 5 times/wk (5-6 times/wk)  Progress Summary (PT)  Progress Toward Functional Goals (PT): progress toward functional goals is gradual  Plan of Care Reviewed With: patient  Progress: no change       Time Calculation:    PT Charges     Row Name 11/16/22 1104             Time Calculation    Start Time 1002  -CT      Stop Time 1043  -CT      Time Calculation (min) 41 min  -CT      PT Received On 11/16/22  -CT            User Key  (r) = Recorded By, (t) = Taken By, (c) = Cosigned By    Initials Name Provider Type    CT Bhargavi Vásquez, PT Physical Therapist              Therapy Charges for Today     Code Description Service Date Service Provider Modifiers Qty    76344856054 HC GAIT TRAINING EA 15 MIN 11/15/2022 Bhargavi Vásquez, PT GP 1    85652029133 HC PT THER PROC EA 15 MIN 11/15/2022 Bhargavi Vásquez, PT GP 1    07080557824 HC PT THERAPEUTIC ACT EA 15 MIN 11/15/2022 Bhargavi Vásquez, PT GP 1    15619305649 HC GAIT TRAINING EA 15 MIN 11/16/2022 Bhargavi Vásquez, PT GP 1    78514458638 HC PT THER PROC EA 15 MIN 11/16/2022 Bhargavi Vásquez, PT GP 1    71006554614 HC PT THERAPEUTIC ACT EA 15 MIN 11/16/2022 Bhargavi Vásquez, PT GP 1          PT G-Codes  Outcome  Measure Options: AM-PAC 6 Clicks Basic Mobility (PT)  AM-PAC 6 Clicks Score (PT): (P) 10  AM-PAC 6 Clicks Score (OT): 16    Bhargavi Vásquez, PT  11/16/2022

## 2022-11-16 NOTE — PROGRESS NOTES
Nutrition Services    Patient Name:  Tre Kinney  YOB: 1932  MRN: [id number]  Admit Date:  10/5/2022    BMI: 26.02  Diet: Soft to chew, chopped meals  Supplement: Magic cup BID  Intake: 72% x 9 meals  Fluid: not meeting needs      Electronically signed by:  Jose Oneal RD  11/16/22 10:40 EST

## 2022-11-16 NOTE — PLAN OF CARE
Goal Outcome Evaluation:              Outcome Evaluation: Pt resting in bed at this time. No complaints of pain or discomfort. VSS. Will continue POC.

## 2022-11-16 NOTE — PROGRESS NOTES
Ms. Kinney is our 89 yo F with hx CHF, chronic kidney disease, coronary artery disease s/p stenting, history of blood clots, chronic anticoagulation with Coumadin, SSS s/p pacemaker placement, chronic diastolic CHF, and iron deficiency anemia who initially presented here after a fall. Patient found to have traumatic hematoma of right hip. Patient found to be anemic requiring 3 units of pRBCs. INR was high on presentation and reversed. Patient had DIANA that stabilized. Patient with chronic vertebral and rib fractures that were evaluated by orthopedic surgery. Patient also had volume overolad requiring diuresis during admission. Patient now admitted to swing bed for debility. Apixiban has been restarted. Discussed care with patient's nurse, Rena, who had no concerns. I briefly evaluated patient who noted she was feeling well and had no new complaints. She was sitting in bedside chair in no distress. Labs, vitals, notes reviewed. Patient's hypertension improved with increased metoprolol. Will repeat BMP and CBC in AM. Swing bed stay has been extended through 11/26.

## 2022-11-16 NOTE — DISCHARGE PLACEMENT REQUEST
"Tre Kinney (89 y.o. Female)     Date of Birth   1932    Social Security Number       Address   [address] [address]    Home Phone   [phone]    MRN   [id number]       Restoration   Scientology    Marital Status                               Admission Date   10/5/22    Admission Type   Urgent    Admitting Provider   Jannet Lagunas MD    Attending Provider   Dante Levine MD    Department, Room/Bed   [address], 3336/1P       Discharge Date       Discharge Disposition       Discharge Destination                               Attending Provider: Dante Levine MD    Allergies: Adhesive Tape, Xanax [Alprazolam]    Isolation: None   Infection: None   Code Status: No CPR    Ht: 170.2 cm (67.01\")   Wt: 75.4 kg (166 lb 3.2 oz)    Admission Cmt: None   Principal Problem: None                Active Insurance as of 10/5/2022     Primary Coverage     Payor Plan Insurance Group Employer/Plan Group    ANTHEM MEDICARE REPLACEMENT ANTH MEDICARE ADVANTAGE [id number]     Payor Plan Address Payor Plan Phone Number Payor Plan Fax Number Effective Dates    [address] [phone]  2022 - None Entered    [facility] [id number]       Subscriber Name Subscriber Birth Date Member ID       TRE KINNEY 1932 [id number]                 Emergency Contacts      (Rel.) Home Phone Work Phone Mobile Phone    Valdez Kinney (Son) [phone] -- --    Leno Kinney (Son) [phone] -- [phone]    Willian Kinney (Son) -- -- [phone]               History & Physical      Jannet Lagunas MD at 10/05/22 1253              [facility] Medicine Services  History & Physical    Patient Identification:  Name:  Tre Kinney  Age:  89 y.o.  Sex:  female  :  1932  MRN:  [id number]   Visit Number:  [id number]  Primary Care Physician:  Jessica Pedro APRN      Please note that the patient is being admitted from [facility] " [place] hospitalist service to [facility] swing bed service for generalized debility as she needs strengthening.  The following is a summary of the [facility] hospitalization:      Date of Admission: 9/19/2022  Date of Discharge: 10/5/2022, to [facility]     PCP: Jessica Pedro APRN     DISCHARGE DIAGNOSIS  -Acute blood loss anemia that was present on admission and due to a right hip traumatic hematoma in the setting of the supratherapeutic INR, status post 3 units packed red blood cells transfused this admission  -Supratherapeutic INR  that was present on admission, in a patient on chronic anticoagulation with Coumadin  for paroxysmal atrial fibrillation (goal INR 2.0-3.0), nail anticoagulation has been discontinued  -Acute superior endplate compression fracture of L1 with retropulsion (9/8/2022)  -Leukocytosis on admission, suspect due to mechanical fall that occurred prior to admission  -Acute kidney injury on top of chronic kidney disease stage IV (baseline creatinine 2.1-2.4 and admission creatinine 6.1)  -Anion gap metabolic acidosis present on admission, suspect due to a combination of lactic acidosis from the acute blood loss anemia and renal acidosis  -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 Vazquez and SANDRA Jaquez with palliative care  Dr. Sanchez with nephrology  Dr. Bell with general surgery  Dr. Ho and SANDRA Xiong with orthopedic surgery     PROCEDURES PERFORMED  9/19/2022: Transfusion of two units of PRBCs on each  day  9/21/2022: Transfusion of one unit of PRBCs on each day  9/19/2022-9/25/2022:  Left cephalic single lumen midline catheter     HOSPITAL COURSE  Patient is a  89 y.o.  female presented to [facility] complaining of a mechanical fall with inability to get up.  In our emergency department, the patient was noted to have a hemoglobin level of 6.3.  Thus, she was admitted to the progressive care unit for further evaluation and treatment.  Please see the admitting history and physical for further details.  On initial exam, a large right hip hematoma was seen and was suspected to be the cause of the acute blood loss.  She was given Kcentra in the emergency department as well as vitamin K.  She ended up needing 3 units during the first 3 days of her hospitalization.  Afterwards, her hemoglobin was stable and she did not need any further treatment.  The hematoma seem to be resolving daily during her hospitalization.  Since the patient had a diverticular bleed in 2018 and has re-bled again now to the point of acute blood loss anemia, it has been determined that the risk of anticoagulation outweighs the benefit in this patient will be discharged without any anticoagulation.    Also, the patient had DIANA on top CKD stage IV on admission; nephrology was consulted.  With IV fluids (the initial ones containing bicarb) and blood transfusions, her renal function stabilized to normal and she will just need follow up with nephrology once she is out of this hospital.     Incidentally, the patient had a vertebral fracture that we believe occurred earlier this month.  Also, there was question of a right distal tib fracture and as such orthopedic surgery was consulted.  No acute fractures were present per their evaluation and the patient was instructed to ambulate and weight-bear as tolerated.  PT and OT saw the patient and she is severely weak; thus, she will be admitted to swing bed for strengthening.     The patient  reported dysuria later in the hospitalization; UA was abnormal but no bacteria grew on the culture.  Our team started Rocephin and this was continued even after the urine culture did not grow because her dysuria resolved.  Please note that the patient finished a course of Rocephin for an acute UTI while she was hospitalized.       During the end of her hospitalization, she had dyspnea.  She was on Lasix at home and chest x-ray here showed some mild CHF.  Thus, we gave the patient some doses of IV Bumex and then increase the home Lasix from 40 mg once a day to 40 mg twice a day.  We will periodically check her blood work to see how the creatinine and the electrolytes are trending.  Please note that we did obtain a Reds vest measurement and it was elevated prior to the doses of IV Bumex.  However, today's Reds vest measurement indicates that her lungs are now dried out.            ----------------------------------------------------------------------------------------------------------------------    The following is the history and physical for the [facility] hospitalization that is summarized above:  Now  Subjective     Chief complaint:  Fall    History of presenting illness:  Tre Kinney is a 89 y.o. female with past medical history significant for CHF, chronic kidney disease, coronary artery disease s/p stenting, history of blood clots, chronic anticoagulation with Coumadin, SSS s/p pacemaker placement, chronic diastolic CHF, and iron deficiency anemia who presents today with a chief complaint of a fall.     Patient reports that she has had 2 falls within the past month.  For the first fall she was evaluated in the emergency department on September 8, 2022 and was complaining of pain in her bilateral lower back and abdomen.  On CT imaging of her lumbar spine she had a moderate acute superior endplate vertebral compression fracture at the L1 with about 40% loss of vertebral body height and mild  posterior osseous retropulsion of about 6 mm and also noted to have chronic compression fracture deformities involving the inferior endplates of L3 and L4.  She was discharged home with a 3-day supply of Norco 7.5 mg, a back brace, and instructions to follow-up with Dr. Krishna in the outpatient setting. Patient reports that she wasn't able to attend her follow-up with orthopedic surgery as she lives with her son who has no vehicle and has no other form of transportation to make it to her doctor's appointments.     She presents today after having a mechanical fall while attempting to get to her telephone that was across the room in her bedroom. She states that she is not for sure what caused her to fall, but landed primarily on her right side. She denies any syncope, dizziness, light-headedness, chest pain, or shortness of breath that contributed to her fall. She does ambulate with a walker at home. She states that her other son that lives close by was able to come over and assist her out of the floor. EMS was called and transported her to the hospital. She was given Norco 5 mg in the ED and reports that she is currently not in any pain. It was noted on her labs that her hemoglobin was 6.1 upon arrival. She does have a large hematoma on her right side from her fall. She states she has also been having dark appearing stools at home. She denies any hematemesis or coffee ground emesis. She states her last EGD/colonoscopy was in 2018 when she was admitted to [facility] which is detailed below. She did state that she has had frequent episodes of midsternal chest pain mostly while lying in the bed at night. She states that it will radiate to bilateral arms and she has associated nausea and diaphoresis. She reports that she will take a nitroglycerin which will relieve her chest pain. She also endorses dysuria and foul-smelling urine. She denies any abdominal pain, nausea, vomiting, or diarrhea. She is currently alert and oriented  to self, place, date of birth, year, and president. She currently resides with her son who assists her with her medications.    Of note, patient was transferred from [facility] to [facility] in March of 2018 due to symptomatic acute blood loss anemia due to rectal bleeding.  She was also on Coumadin at that time for atrial fibrillation and prior blood clots.  GI services was consulted and she underwent an EGD and colonoscopy during that stay.  Her EGD showed no active bleeding but evidence of gastritis.  Colonoscopy revealed a few polyps that were not removed or biopsied  And also found to have diverticulosis and likely diverticular bleed since resolved.  She did receive 2 units packed red blood cells with no further bleeding noted.  Her Coumadin was held throughout her hospital stay and 3 days after discharge, but was resumed per PCP recommendations with them to manage Coumadin and on orders.    Upon arrival to the ED, vital signs were temperature 97.7, heart rate 87, respirations 18, blood pressure 127/60, SPO2 97% on room air.  CMP with glucose 137, sodium 130, potassium 5.5, CO2 17, chloride 93, anion gap 20, creatinine 6.11, BUN 31, EGFR 6.1, albumin 3.43, otherwise unremarkable.  CBC with WBC 15.87, RBC 2.16, hemoglobin 6.3, hematocrit 19.6, RDW 17, RDW-SD 55.5, otherwise unremarkable.    Known Emergency Department medications received prior to my evaluation included IV Rocephin, Norco 5 mg, IV Zofran 4 mg, IV phytonadione.     Patient will be admitted to the PCU for further evaluation and monitoring.    ---------------------------------------------------------------------------------------------------------------------   Review of Systems   Constitutional: Positive for diaphoresis (associated with chest pain). Negative for activity change, chills and fever.   HENT: Negative for congestion and rhinorrhea.    Eyes: Negative for discharge and redness.   Respiratory: Negative for apnea,  cough and shortness of breath.    Cardiovascular: Positive for chest pain (midsternal; radiates to bilateral arms). Negative for palpitations.   Gastrointestinal: Positive for nausea (associated with chest pain). Negative for abdominal distention, abdominal pain, diarrhea and vomiting.   Genitourinary: Positive for dysuria. Negative for difficulty urinating, frequency and urgency.        Reports foul smelling urine   Musculoskeletal: Positive for gait problem (uses walker to ambulate). Negative for arthralgias and myalgias.   Skin: Positive for color change (bruising from recent falls). Negative for wound.   Neurological: Negative for dizziness, syncope and light-headedness.   Psychiatric/Behavioral: Negative for agitation, behavioral problems and confusion.        ---------------------------------------------------------------------------------------------------------------------   Past Medical History:   Diagnosis Date   • Anemia    • Arthritis    • CHF (congestive heart failure) (HCC)    • Chronic kidney disease    • Coronary artery disease    • H/O blood clots    • History of transfusion     no reaction    • Hypertension      Past Surgical History:   Procedure Laterality Date   • ABDOMINAL SURGERY     • APPENDECTOMY     • CARDIAC SURGERY     •  SECTION     • COLONOSCOPY N/A 3/7/2018    Procedure: COLONOSCOPY;  Surgeon: Willie Gresham MD;  Location:  [facility] ENDOSCOPY;  Service:    • CORONARY ANGIOPLASTY WITH STENT PLACEMENT     • ENDOSCOPY N/A 3/6/2018    Procedure: ESOPHAGOGASTRODUODENOSCOPY;  Surgeon: Willie Gresham MD;  Location:  [facility] ENDOSCOPY;  Service:    • PACEMAKER IMPLANTATION      left side    • SKIN BIOPSY     • TUBAL ABDOMINAL LIGATION       Family History   Problem Relation Age of Onset   • Other Mother         cardiac disorder   • Hypertension Mother    • Heart disease Mother    • Thyroid disease Mother    • Other Father         cardiac disorder   • Heart disease Father    • Hypertension  Father    • Other Sister         cardiac disorder   • Other Brother         cardiac disorder   • Other Other         cardiac disorder   • Other Other         cardiac disorder     Social History     Socioeconomic History   • Marital status:    Tobacco Use   • Smoking status: Never Smoker   Substance and Sexual Activity   • Alcohol use: No   • Drug use: No   • Sexual activity: Defer     ---------------------------------------------------------------------------------------------------------------------   Allergies:  Adhesive tape and Xanax [alprazolam]  ---------------------------------------------------------------------------------------------------------------------   Home medications:   Current listed hospital scheduled medications may not yet reflect those currently placed in orders that are signed and held awaiting patient's arrival to floor.   ---------------------------------------------------------------------------------------------------------------------      Objective      Vital Signs:  Temp:  [97.5 °F (36.4 °C)-98.1 °F (36.7 °C)] 98.1 °F (36.7 °C)  Heart Rate:  [71-93] 87  Resp:  [16-20] 20  BP: (114-157)/(49-87) 127/58  Vitals             09/19/22  1052   Weight: 72.6 kg (160 lb)         Body mass index is 25.06 kg/m².  ---------------------------------------------------------------------------------------------------------------------       Physical Exam  Constitutional:       General: She is awake.      Appearance: Normal appearance. She is overweight.      Interventions: Nasal cannula in place.      Comments: Resting comfortably in bed. Appears in no acute distress. Nasal cannula in place.    HENT:      Head: Normocephalic and atraumatic.      Right Ear: External ear normal.      Left Ear: External ear normal.      Nose: Nose normal.      Mouth/Throat:      Mouth: Mucous membranes are dry.      Pharynx: Oropharynx is clear.   Eyes:      Extraocular Movements: Extraocular movements intact.       Conjunctiva/sclera: Conjunctivae normal.      Pupils: Pupils are equal, round, and reactive to light.   Cardiovascular:      Rate and Rhythm: Normal rate and regular rhythm.      Pulses: Normal pulses.           Dorsalis pedis pulses are 2+ on the right side and 2+ on the left side.        Posterior tibial pulses are 2+ on the right side and 2+ on the left side.      Heart sounds: Normal heart sounds. No murmur heard.    No friction rub. No gallop.   Pulmonary:      Effort: Pulmonary effort is normal. No respiratory distress.      Breath sounds: Normal breath sounds. No wheezing, rhonchi or rales.      Comments: Nasal cannula placed for comfort; saturations currently 97%.   Abdominal:      General: Abdomen is flat. Bowel sounds are normal. There is no distension.      Palpations: Abdomen is soft.      Tenderness: There is no abdominal tenderness. There is no guarding.   Musculoskeletal:         General: Normal range of motion.      Right lower leg: No edema.      Left lower leg: No edema.   Skin:     General: Skin is warm and dry.      Coloration: Skin is pale.      Findings: Bruising present. No erythema.          Neurological:      General: No focal deficit present.      Mental Status: She is alert and oriented to person, place, and time. Mental status is at baseline.      Comments: Alert and oriented x 4. No focal deficits noted.    Psychiatric:         Mood and Affect: Mood normal.         Behavior: Behavior normal. Behavior is cooperative.         Thought Content: Thought content normal.         ---------------------------------------------------------------------------------------------------------------------  EKG:            Telemetry:       I have personally looked at both the EKG and the telemetry strips.  ---------------------------------------------------------------------------------------------------------------------     Results from last 7 days   Lab Units 09/19/22  2564 09/19/22  4224 09/19/22  5502  09/19/22  1521   LACTATE mmol/L  --  1.3 2.3*  --    WBC 10*3/mm3  --   --   --  15.87*   HEMOGLOBIN g/dL  --   --   --  6.3*   HEMATOCRIT %  --   --   --  19.6*   MCV fL  --   --   --  90.7   MCHC g/dL  --   --   --  32.1   PLATELETS 10*3/mm3  --   --   --  250   INR   >10.00*  --   --   --            Results from last 7 days   Lab Units 09/19/22  1521   SODIUM mmol/L 130*   POTASSIUM mmol/L 5.5*   CHLORIDE mmol/L 93*   CO2 mmol/L 17.0*   BUN mg/dL 91*   CREATININE mg/dL 6.11*   CALCIUM mg/dL 9.0   GLUCOSE mg/dL 137*   ALBUMIN g/dL 3.43*   BILIRUBIN mg/dL 0.9   ALK PHOS U/L 77   AST (SGOT) U/L 19   ALT (SGPT) U/L 8   Estimated Creatinine Clearance: 7.2 mL/min (A) (by C-G formula based on SCr of 6.11 mg/dL (H)).         Results from last 7 days   Lab Units 09/19/22  1521   TROPONIN T ng/mL 0.036*           Results from last 7 days   Lab Units 09/19/22  1521   PROBNP pg/mL 11,666.0*            Lab Results   Component Value Date     TSH 4.999 (H) 11/10/2017      ---------------------------------------------------------------------------------------------------------------------  Imaging Results (Last 7 Days)      Procedure Component Value Units Date/Time     CT Abdomen Pelvis Without Contrast [[id number]] Collected: 09/19/22 2205       Updated: 09/19/22 2207     Narrative:       CT Abdomen Pelvis WO     INDICATION:   Abdominal pain.     TECHNIQUE:   CT of the abdomen and pelvis without IV contrast. Coronal and sagittal reconstructions were obtained.  Radiation dose reduction techniques included automated exposure control or exposure modulation based on body size. Count of known CT and cardiac nuc  med studies performed in previous 12 months: 2.      COMPARISON:   None available.     FINDINGS:  Abdomen: There is mild scarring and volume loss at the left lung base. There is a moderate-sized hiatal hernia. The liver contains a small cyst or hemangioma in the right lobe measuring 1.5 cm in diameter with a similar  benign-appearing lesion in the  lateral segment of the left lobe just under the capsule measuring 1 cm. The spleen and pancreas are normal in size. There is bilateral renal atrophy, worse on the right. There is a 3.5 cm right renal cyst. No hydronephrosis. The adrenal glands are normal  in size. No evidence of retroperitoneal adenopathy. No distended bowel loops.     Pelvis: No appendix is seen. No inflammatory changes are seen around the cecum. There is sigmoid diverticulosis. No acute inflammatory changes are seen. No enlarged pelvic lymph nodes are seen. There is subcutaneous streaking of fat in the right gluteal  area that could represent either inflammatory change or hematoma. It is new since a pelvic CT on 922.        Impression:       Subcutaneous swelling in the gluteal area most likely representing a gluteal hematoma. This is a new finding since the recent pelvic CT. No acute or inflammatory changes are seen elsewhere in the abdomen or pelvis. Also noted is a hiatal hernia, renal  atrophy and diverticulosis.              Signer Name: Bubba Mancuso MD   Signed: 9/19/2022 10:05 PM   Workstation Name: [id number]    [facility]     CT Lower Extremity Right Without Contrast [[id number]] Collected: 09/19/22 1440       Updated: 09/19/22 1443     Narrative:       EXAMINATION: CT LOWER EXTREMITY RIGHT WO CONTRAST-      CLINICAL INDICATION: tib/fib (questionable tibial plateau and distal  tibia fracture fracture on x-ray)        COMPARISON: X-rays of the right lower extremity dated 09/19/2022     Radiation dose reduction techniques were utilized per ALARA protocol.  Automated exposure control was initiated through either or [employer] or  [employer] software packages by  protocol.           PROCEDURE: Axial images were acquired from the distal femur through the  ankle without any IV contrast. Reformatted images were created.     FINDINGS:  There is joint space narrowing at the  knee.  Irregularity of the lateral tibial plateau probably due to an old injury  and or arthritic change, but no convincing evidence of acute fracture on  the presented study     Otherwise no evidence of acute fracture.        Impression:       1. Extensive arthritic change at the knee, but no acute fracture is seen        This report was finalized on 9/19/2022 2:41 PM by Dr. Bob Lovell MD.        XR Hip With or Without Pelvis 2 - 3 View Right [[id number]] Collected: 09/19/22 1306       Updated: 09/19/22 1332     Narrative:       EXAM:    XR Right Hip With Pelvis When Performed, 2 or 3 Views     EXAM DATE:    9/19/2022 12:11 PM     CLINICAL HISTORY:    trauma     TECHNIQUE:    Two or three views of the right hip with pelvis when performed.     COMPARISON:    No relevant prior studies available.     FINDINGS:    Bones/joints:  Unremarkable.  No acute fracture.  No dislocation.    Soft tissues:  Unremarkable.        Impression:         No acute findings in the right hip.     This report was finalized on 9/19/2022 1:07 PM by Dr. Bob Lovell MD.        XR Femur 2 View Right [[id number]] Collected: 09/19/22 1306       Updated: 09/19/22 1308     Narrative:       EXAM:    XR Right Femur, 2 Views     EXAM DATE:    9/19/2022 12:12 PM     CLINICAL HISTORY:    fall     TECHNIQUE:    Frontal and lateral views of the right femur.     COMPARISON:    No relevant prior studies available.     FINDINGS:    Bones/joints:  Unremarkable.  No acute fracture.  No dislocation.    Soft tissues:  Unremarkable.        Impression:         No acute findings in the right femur.     This report was finalized on 9/19/2022 1:06 PM by Dr. Bob Lovell MD.        XR Tibia Fibula 2 View Right [[id number]] Collected: 09/19/22 1305       Updated: 09/19/22 1308     Narrative:       EXAM:    XR Right Tibia and Fibula, 2 Views     EXAM DATE:    9/19/2022 12:11 PM     CLINICAL HISTORY:    fall     TECHNIQUE:    Frontal and lateral views of the right  tibia and fibula.     COMPARISON:    No relevant prior studies available.     FINDINGS:    Bones/joints:  Mild irregularity of the lateral tibial plateau. Cannot  exclude nondisplaced fracture.  No dislocation.    Soft tissues:  Unremarkable.  No radiopaque foreign body.    Other findings:  Demineralization.        Impression:         Mild irregularity of the lateral tibial plateau. Cannot exclude  nondisplaced fracture.     This report was finalized on 9/19/2022 1:06 PM by Dr. Bob Lovell MD.        XR Ankle 3+ View Right [[id number]] Collected: 09/19/22 1304       Updated: 09/19/22 1307     Narrative:       EXAM:    XR Right Ankle Complete, 3 or More Views     EXAM DATE:    9/19/2022 12:11 PM     CLINICAL HISTORY:    fall     TECHNIQUE:    Frontal, lateral and oblique views of the right ankle.     COMPARISON:    No relevant prior studies available.     FINDINGS:    Bones/joints:  Oblique lucency through the distal tibia medially may  represent a nondisplaced fracture.  Old fracture of the distal fibula.   No dislocation.    Soft tissues:  Unremarkable.        Impression:         Oblique lucency through the distal tibia medially may represent a  nondisplaced fracture.     This report was finalized on 9/19/2022 1:05 PM by Dr. Bob Lovell MD.        XR Foot 3+ View Right [[id number]] Collected: 09/19/22 1304       Updated: 09/19/22 1306     Narrative:       EXAM:    XR Right Foot Complete, 3 or More Views     EXAM DATE:    9/19/2022 12:11 PM     CLINICAL HISTORY:    fall     TECHNIQUE:    Frontal, lateral and oblique views of the right foot.     COMPARISON:    No relevant prior studies available.     FINDINGS:    Bones/joints:  Unremarkable.  No acute fracture.  No dislocation.    Soft tissues:  Unremarkable.  No radiopaque foreign body.        Impression:         No acute findings in the right foot.     This report was finalized on 9/19/2022 1:04 PM by Dr. Bob Lovell MD.          Last echocardiogram:  Results  for orders placed during the hospital encounter of 09/19/22    Adult Transthoracic Echo Complete W/ Cont if Necessary Per Protocol    Interpretation Summary  · Left ventricular ejection fraction appears to be 51 - 55%. Left ventricular systolic function is low normal.  · Left ventricular wall thickness is consistent with mild concentric hypertrophy.  · Left ventricular diastolic function was indeterminate.  · The right ventricular cavity is moderately dilated.  · Moderately reduced right ventricular systolic function noted.  · The left atrial cavity is moderate to severely dilated  · The right atrial cavity is moderately dilated.  · Moderate tricuspid valve regurgitation is present.  · Estimated right ventricular systolic pressure from tricuspid regurgitation is markedly elevated (>55 mmHg).  · Severe pulmonary hypertension is present.  · Electronic lead present in the ventricle  · There is no evidence of pericardial effusion    I have personally reviewed the above radiology images and read the final radiology report on 10/05/22  ---------------------------------------------------------------------------------------------------------------------  Assessment / Plan     ASSESSMENT/PLAN:  -Acute life-threatening anemia likely secondary to large gluteal hematoma, POA  -History of acute blood loss anemia in 2018 suspected to be from diverticular bleed   -History of iron deficiency anemia   -Hemoglobin and hematocrit upon arrival 6.3 and 19.6 respectively; previously during ED visit on 09/08 hemoglobin 12.4 and hematocrit 38.8.   -Last colonoscopy/EGD in 2018 during [facility] admission. EGD showed no active bleeding but evidence of gastritis; Colonoscopy revealed a few polyps that were not removed or biopsied, also found to have diverticulosis and likely diverticular bleed since resolved.  -Reports episodes of melena at home; will monitor if patient has additional episodes of melena during admission.   -ED provider ordered 2 U  PRBC's; nursing communication in chart to order repeat CBC after first unit of blood.   -Obtain iron, vitamin B12, and folate.    -Possible acute, nondisplaced, right lateral tibial plateau and distal tibia fractures, POA  -Traumatic right gluteal hematoma, POA  -Acute superior endplate compression fracture of L1 with retropulsion (09/08/22)  -Leukocytosis, likely reactive to fall  -Mechanical fall  -XR of right lower extremity with findings noted above.   -CT abdomen/pelvis with subcutaneous swelling swelling in gluteal area representing gluteal hematoma.   -Vertebral fracture found during 09/08/22 ED visit 2/2 fall and was discharged home with back brace with instruction to follow-up with orthopedic surgery, but missed appointment due to transportation.   -Inpatient orthopedic and general surgery consulted, input and assistance much appreciated.   -Will keep NPO pending surgical evaluation.   -Up with assistance, fall precautions.   -Neurovascular checks every 4 hours.   -PT/OT consults ordered.     -Acute on chronic kidney disease stage IV, POA  -Bilateral renal atrophy, R > L   -Right renal cyst (3.5 cm)  -Anion-gap metabolic acidosis   -Lactic acidosis   -Mild hyperkalemia   -Creatinine upon arrival 6.11; previously during ED visit on 09/08 creatinine 2.45; baseline appears to be 2.1-2.4.   -CT A/P reviewed with findings noted above.   -Lactate 2.3 upon arrival, but has normalized after fluids.   -Received 500 mL fluid bolus in ED; will hold on additional fluids at this time as 2 units of blood to be transfused.   -Inpatient nephrology consulted, input and assistance much appreciated.   -Obtain creatinine kinase to evaluate for rhabdomyolysis in setting of fall.   -Obtain urine electrolytes and urine protein/creatinine ratio.   -Avoid nephrotoxins as able; planning to hold home Lasix and Neurontin.     -Supratherapeutic INR on chronic anticoagulation with Coumadin (goal INR 2.0-3.0)  -History of blood clots,  details unknown  -Paroxysmal atrial fibrillation   -SSS s/p pacemaker placement  -PT and PTT > 100 and INR > 10 upon arrival.   -Warfarin reversal initiated in ED with IV Vitamin K 10 units and IV KCentra 3630 units.   -Daily PT/INR ordered.   -Will hold Coumadin for now.     -Chest pain with typical features, POA  -Coronary artery disease s/p stenting   -Reports intermittent episodes of midsternal chest pain at home.   -Troponin T 0.036; continue to trend.   -EKG without acute ischemic abnormalities present.   -Obtain a.m. TTE.   -May consider cardiology consult.     -Acute urinary tract infection, POA  -Reported dysuria  -UA with 3 + leukocytes, 21-30 WBC, 2+ bacteria, 36 comets epithelium  -Pending urine culture.  -Received IV Rocephin in ED; will continue on admission as patient is symptomatic.    -Chronic diastolic CHF  -Echo from 12/2017 reviewed and noted above.   -Appears euvolemic on exam.   -Obtain TTE as noted above.   -Monitor with strict I/O's and daily weights.     -Generalized anxiety disorder  -Supportive care.   -Review home medications once reconciled.     -Debility  -Palliative care consult.   - consult.   ----------  -DVT prophylaxis: SCD's contraindicated 2/2 frature  -Activity: Up with assistance, fall precautions  -Expected length of stay:   INPATIENT status due to the need for care which can only be reasonably provided in an hospital setting such as aggressive/expedited ancillary services and/or consultation services, the necessity for IV medications, close physician monitoring and/or the possible need for procedures.  In such, I feel patient's risk for adverse outcomes and need for care warrant INPATIENT evaluation and predict the patient's care encounter to likely last beyond 2 midnights.    Medical Intervention Limits: NO intubation (DNI)  Level Of Support Discussed With: Patient; Next of Kin (If No Surrogate)  Code Status (Patient has no pulse and is not breathing): No CPR  (Do Not Attempt to Resuscitate)  Medical Interventions (Patient has pulse or is breathing): Limited Support  Comments: Patient states that she does not want to be resuscitated or put on a ventilator, her son Leno supports her decision    High risk secondary to acute life-threatening anemia likely secondary to large gluteal hematoma, possible acute nondisplaced right lateral tibial plateau and distal tibia fractures, acute on chronic CKD stage IV, supratherapeutic INR     Disposition: Pending clinical course    Michelle Sellers PA-C  09/20/22  01:28 EDT    ---------------------------------------------------------------------------------------------------------------------       Jannet Lagunas MD  [facility]  10/05/22  16:57 EDT      Electronically signed by Jannet Lagunas MD at 10/05/22 1706       Vital Signs (last day)     Date/Time Temp Temp src Pulse Resp BP Patient Position SpO2    11/16/22 0636 97.9 (36.6) Oral 83 16 165/84 Sitting 96    11/15/22 2300 98.1 (36.7) Oral 91 20 121/58 Lying 95    11/15/22 1900 98 (36.7) Oral 76 18 131/52 Lying 96    11/15/22 0654 97.9 (36.6) Oral 81 18 152/82 Lying 97          Intake & Output (last day)       11/15 0701  11/16 0700 11/16 0701  11/17 0700    P.O. 660 480    I.V. (mL/kg) 0 (0)     Total Intake(mL/kg) 660 (8.8) 480 (6.4)    Urine (mL/kg/hr) 1150 (0.6) 300 (0.4)    Total Output 1150 300    Net -490 +180              Lines, Drains & Airways     Active LDAs     Name Placement date Placement time Site Days    Midline Catheter - Single Lumen 09/25/22 Left 09/25/22  1900  -- 51    Urethral Catheter Silicone 16 Fr. 10/10/22  1442  -- 37                  Current Facility-Administered Medications   Medication Dose Route Frequency Provider Last Rate Last Admin   • apixaban (ELIQUIS) tablet 5 mg  5 mg Oral Q12H Mustapha Loredo DO   5 mg at 11/16/22 0828   • benzocaine-menthol (DERMOPLAST) 20-0.5 % topical spray   Topical 4x Daily PRN  Kennedi Saucedo MD   Given at 11/03/22 0548   • docusate sodium (COLACE) capsule 100 mg  100 mg Oral BID PRN Kennedi Saucedo MD   100 mg at 11/01/22 2155   • escitalopram (LEXAPRO) tablet 10 mg  10 mg Oral Daily Jannet Lagunas MD   10 mg at 11/16/22 0828   • furosemide (LASIX) tablet 40 mg  40 mg Oral BID Jannet Lagunas MD   40 mg at 11/16/22 0828   • gabapentin (NEURONTIN) capsule 300 mg  300 mg Oral Nightly Jannet Lagunas MD   300 mg at 11/15/22 2146   • guaiFENesin-dextromethorphan (ROBITUSSIN DM) 100-10 MG/5ML syrup 5 mL  5 mL Oral Q4H PRN Kennedi Saucedo MD       • hydrALAZINE (APRESOLINE) injection 10 mg  10 mg Intravenous Q6H PRN Msutapha Loredo DO       • hydrALAZINE (APRESOLINE) tablet 5 mg  5 mg Oral Q8H Jannet Lagunas MD   5 mg at 11/16/22 1328   • HYDROcodone-acetaminophen (NORCO) 7.5-325 MG per tablet 1 tablet  1 tablet Oral Q8H PRN Jannet Lagunas MD   1 tablet at 11/13/22 2114   • Magnesium Sulfate 2 gram Bolus, followed by 8 gram infusion (total Mg dose 10 grams)- Mg less than or equal to 1mg/dL  2 g Intravenous PRN Jannet Lagunas MD        Or   • Magnesium Sulfate 2 gram / 50mL Infusion (GIVE X 3 BAGS TO EQUAL 6GM TOTAL DOSE) - Mg 1.1 - 1.5 mg/dl  2 g Intravenous PRN Jannet Lagunas MD        Or   • Magnesium Sulfate 4 gram infusion- Mg 1.6-1.9 mg/dL  4 g Intravenous PRN Jannet Lagunas MD       • melatonin tablet 10 mg  10 mg Oral Nightly Jannet Lagunas MD   10 mg at 11/15/22 2145   • metoprolol tartrate (LOPRESSOR) tablet 25 mg  25 mg Oral Q12H Dante Levine MD   25 mg at 11/16/22 0827   • multivitamin (THERAGRAN) tablet 1 tablet  1 tablet Oral Daily Jannet Lagunas MD   1 tablet at 11/16/22 0827   • ondansetron (ZOFRAN) injection 4 mg  4 mg Intravenous Q6H PRN Jannet Lagunas MD       • pantoprazole (PROTONIX) EC tablet 40 mg  40 mg Oral Q AM Jannet Lagunas MD   40 mg at 11/16/22 0648   • Pharmacy Consult   Does not apply  Continuous PRN Mustapha Loredo DO       • phenol (CHLORASEPTIC) 1.4 % liquid 1 spray  1 spray Mouth/Throat Q2H PRN Jannet Lagunas MD   1 spray at 10/13/22 2056   • polyethylene glycol (MIRALAX) packet 17 g  17 g Oral BID PRN Jannet Lagunas MD       • potassium chloride (K-DUR,KLOR-CON) CR tablet 40 mEq  40 mEq Oral PRN Jannet Lagunas MD        Or   • potassium chloride (KLOR-CON) packet 40 mEq  40 mEq Oral PRN Jannet Lagunas MD        Or   • potassium chloride 10 mEq in 100 mL IVPB  10 mEq Intravenous Q1H PRN Jannet Lagunas MD       • prochlorperazine (COMPAZINE) injection 5 mg  5 mg Intravenous Q6H PRN Jannet Lagunas MD        Or   • prochlorperazine (COMPAZINE) tablet 5 mg  5 mg Oral Q6H PRN Jannet Lagunas MD        Or   • prochlorperazine (COMPAZINE) suppository 25 mg  25 mg Rectal Q12H PRN Jannet Lagunas MD       • promethazine (PHENERGAN) 6.25 mg in sodium chloride 0.9 % 50 mL  6.25 mg Intravenous Q6H PRN Jannet Lagunas MD       • sodium chloride 0.9 % flush 10 mL  10 mL Intravenous PRN Jannet Lagunas MD       • sodium chloride 0.9 % flush 10 mL  10 mL Intravenous Q12H Jannet Lagunas MD   10 mL at 11/16/22 0846   • sodium chloride 0.9 % flush 10 mL  10 mL Intravenous PRN Jannet Lagunas MD       • sodium chloride 0.9 % flush 10 mL  10 mL Intravenous Q12H Jannet Lagunas MD   10 mL at 11/16/22 0846   • sodium chloride 0.9 % flush 10 mL  10 mL Intravenous PRN Jannet Lagunas MD       • tamsulosin (FLOMAX) 24 hr capsule 0.4 mg  0.4 mg Oral Daily Jannet Lagunas MD   0.4 mg at 11/16/22 0827     Operative/Procedure Notes (most recent note)    No notes of this type exist for this encounter.            Physician Progress Notes (most recent note)      Dante Levine MD at 11/15/22 0523        Ms. Kinney is our 89 yo F with hx CHF, chronic kidney disease, coronary artery disease s/p stenting, history of blood clots, chronic anticoagulation with Coumadin,  SSS s/p pacemaker placement, chronic diastolic CHF, and iron deficiency anemia who initially presented here after a fall. Patient found to have right hip traumatic hematoma. Patient found to be anemic requiring 3 units of pRBCs. INR was high and reversed. Patient had DIANA that stabilized. Patient with chronic vertebral and rib fractures that were evaluated by orthopedic surgery. Patient also had volume overolad requiring diuresis during admission. Patient now admitted to swing bed for debility. Patient not seen today. Discussed care with patient's nurse, Rena, who had no concerns. She noted patient was resting comfortably. Labs, vitals, notes reviewed. Patient on hypertensive side, will conservatively increase metoprolol. Swing bed stay has been extended through 11/26.     Electronically signed by Dante Levine MD at 11/15/22 3696          Consult Notes (most recent note)      Andrew Chapman MD at 11/03/22 1249      Consult Orders    1. Inpatient Psychiatrist Consult [[id number]] ordered by Kennedi Saucedo MD at 11/03/22 0744    2. Inpatient Psychiatrist Consult [[id number]] ordered by Kennedi Saucedo MD at 10/31/22 1147                   Referring Provider: Lewis  Reason for Consultation: Capacity      Chief complaint/Focus of Exam: Capacity Evaluation     Subjective .     History of present illness: Patient is an 89-year-old female with a history significant for CHF, CKD, CAD, history of clots, chronic anticoagulation, sick sinus syndrome with pacer placement, chronic diastolic CHF, anemia, and depression who originally admitted to the hospital after a fall.  Psychiatry was originally consulted early in her hospitalization 1 month ago for depressive symptoms and for medication management in light of hyponatremia.  I evaluated patient at that time.  Psychiatry was reconsulted to evaluate patient for medical decision-making capacity as there have been some concerns about treatment care planning  going forward and family has been involved.  I evaluated patient at bedside today while she was eating lunch.  She was sitting up and reported that she was having some neck pain but denied any other complaints.  She was alert and oriented x4.  She is able to answer all my questions appropriately.  She was not experiencing any confusion, hallucinations, delusions, or paranoia.  She denied SI/HI/AVH.  She was making jokes about being in the hospital and seemed to be in good spirits despite her neck pain.    Review of Systems  Pertinent items are noted in HPI    History  Past Medical History:   Diagnosis Date   • Anemia    • Arthritis    • CHF (congestive heart failure) (HCC)    • Chronic kidney disease    • Coronary artery disease    • H/O blood clots    • History of transfusion     no reaction    • Hypertension    ,   Past Surgical History:   Procedure Laterality Date   • ABDOMINAL SURGERY     • APPENDECTOMY     • CARDIAC SURGERY     •  SECTION     • COLONOSCOPY N/A 3/7/2018    Procedure: COLONOSCOPY;  Surgeon: Willie Gresham MD;  Location:  [facility];  Service:    • CORONARY ANGIOPLASTY WITH STENT PLACEMENT     • ENDOSCOPY N/A 3/6/2018    Procedure: ESOPHAGOGASTRODUODENOSCOPY;  Surgeon: Willie Gresham MD;  Location:  [facility];  Service:    • PACEMAKER IMPLANTATION      left side    • SKIN BIOPSY     • TUBAL ABDOMINAL LIGATION     ,   Family History   Problem Relation Age of Onset   • Other Mother         cardiac disorder   • Hypertension Mother    • Heart disease Mother    • Thyroid disease Mother    • Other Father         cardiac disorder   • Heart disease Father    • Hypertension Father    • Other Sister         cardiac disorder   • Other Brother         cardiac disorder   • Other Other         cardiac disorder   • Other Other         cardiac disorder   ,   Social History     Socioeconomic History   • Marital status:    Tobacco Use   • Smoking status: Never   Substance and Sexual  Activity   • Alcohol use: No   • Drug use: No   • Sexual activity: Defer     E-cigarette/Vaping     E-cigarette/Vaping Substances     E-cigarette/Vaping Devices       ,   Medications Prior to Admission   Medication Sig Dispense Refill Last Dose   • ALPRAZolam (XANAX) 1 MG tablet Take 1 mg by mouth 2 (Two) Times a Day As Needed for Anxiety.   Unknown at Unknown time   • aspirin 81 MG EC tablet Take 81 mg by mouth Daily.   Unknown at Unknown time   • escitalopram (LEXAPRO) 10 MG tablet Take 10 mg by mouth Daily.   Unknown at Unknown time   • furosemide (LASIX) 40 MG tablet Take 40 mg by mouth Daily.   Unknown at Unknown time   • gabapentin (NEURONTIN) 400 MG capsule Take 400 mg by mouth Every Night.   Unknown at Unknown time   • hydrALAZINE (APRESOLINE) 100 MG tablet Take 1 tablet by mouth Every 8 (Eight) Hours. 90 tablet 0 Unknown at Unknown time   • HYDROcodone-acetaminophen (NORCO) 7.5-325 MG per tablet Take 1 tablet by mouth Every 8 (Eight) Hours As Needed for Moderate Pain.   Unknown at Unknown time   • isosorbide mononitrate (IMDUR) 60 MG 24 hr tablet Take 60 mg by mouth Daily.   Unknown at Unknown time   • metoprolol tartrate (LOPRESSOR) 50 MG tablet Take 50 mg by mouth 2 (Two) Times a Day.   Unknown at Unknown time   • pantoprazole (PROTONIX) 40 MG EC tablet Take 40 mg by mouth Daily.   Unknown at Unknown time   • potassium chloride (MICRO-K) 10 MEQ CR capsule Take 10 mEq by mouth 2 (Two) Times a Day.   Unknown at Unknown time   • warfarin (COUMADIN) 6 MG tablet Take 6 mg by mouth Daily.   Unknown at Unknown time   , Scheduled Meds:  apixaban, 5 mg, Oral, Q12H  escitalopram, 10 mg, Oral, Daily  furosemide, 40 mg, Oral, BID  gabapentin, 300 mg, Oral, Nightly  hydrALAZINE, 5 mg, Oral, Q8H  melatonin, 10 mg, Oral, Nightly  metoprolol tartrate, 12.5 mg, Oral, Q12H  multivitamin, 1 tablet, Oral, Daily  pantoprazole, 40 mg, Oral, Q AM  sodium chloride, 10 mL, Intravenous, Q12H  sodium chloride, 10 mL, Intravenous,  Q12H  tamsulosin, 0.4 mg, Oral, Daily    , Continuous Infusions:  Pharmacy Consult,     , PRN Meds:  •  benzocaine-menthol  •  docusate sodium  •  HYDROcodone-acetaminophen  •  magnesium sulfate **OR** magnesium sulfate **OR** magnesium sulfate  •  ondansetron  •  Pharmacy Consult  •  phenol  •  polyethylene glycol  •  potassium chloride **OR** potassium chloride **OR** potassium chloride  •  prochlorperazine **OR** prochlorperazine **OR** prochlorperazine  •  promethazine  •  sodium chloride  •  sodium chloride  •  sodium chloride and Allergies:  Adhesive tape and Xanax [alprazolam]    Objective     Vital Signs   Temp:  [98.2 °F (36.8 °C)-98.4 °F (36.9 °C)] 98.2 °F (36.8 °C)  Heart Rate:  [71-77] 75  Resp:  [16-18] 16  BP: (129-161)/() 161/116    Mental Status Exam:   Mental Status Exam:    Hygiene:   good  Cooperation:  Cooperative  Eye Contact:  Good  Psychomotor Behavior:  Slow  Affect:  Full range  Hopelessness: Optimistic  Speech:  Normal  Thought Progress:  Goal directed and Linear  Thought Content:  Normal and Mood congruent  Suicidal:  None  Homicidal:  None  Hallucinations:  None  Delusion:  None  Memory:  Intact  Orientation:  Person, Place, Time and Situation  Reliability:  good  Insight:  Fair  Judgement:  Fair  Impulse Control:  Fair    Results Review:   I reviewed the patient's new clinical results.  Lab Results (last 24 hours)     ** No results found for the last 24 hours. **        Imaging Results (Last 24 Hours)     ** No results found for the last 24 hours. **            Assessment & Plan     Evaluation for medical decision making capacity  -At this point in time, patient does have capacity for medical decision making.  She is alert and oriented and able to answer questions appropriately and I do not have any concerns about her making her own treatment plan and decisions going forward.    I discussed the patients findings and my recommendations with patient    Andrew Chapman,  MD  22  12:49 EDT        Electronically signed by Andrew Chapman MD at 22 1253          Nutrition Notes (most recent note)      Magi Oneal RD at 22 1040        Nutrition Services    Patient Name:  Tre Kinney  YOB: 1932  MRN: [id number]  Admit Date:  10/5/2022    BMI: 26.02  Diet: Soft to chew, chopped meals  Supplement: Magic cup BID  Intake: 72% x 9 meals  Fluid: not meeting needs      Electronically signed by:  Magi Oneal RD  22 10:40 EST     Electronically signed by Magi Oneal RD at 22 1041          Physical Therapy Notes (most recent note)      Bhargavi Vásquez, PT at 22 1002  Version 1 of 1         Acute Care - Physical Therapy Treatment Note  [facility]     Patient Name: Tre Kinney  : 1932  MRN: [id number]  Today's Date: 2022   Onset of Illness/Injury or Date of Surgery: 10/06/22  Visit Dx:   No diagnosis found.  Patient Active Problem List   Diagnosis   • Acid reflux   • Ankle arthralgia   • Cardiac conduction disorder   • Closed fracture of distal fibula   • Arteriosclerosis of coronary artery   • Fracture of distal end of tibia   • Elevated cholesterol   • BP (high blood pressure)   • Hypertrophic polyarthritis   • OP (osteoporosis)   • Right heart failure (HCC)   • Heart failure, chronic, right-sided (HCC)   • SOB (shortness of breath)   • Palpitations   • CHF (congestive heart failure) (HCC)   • Congestive heart failure (HCC)   • Precordial pain   • Chronic anticoagulation   • Hypokalemia   • Anemia, unspecified type   • Generalized weakness     Past Medical History:   Diagnosis Date   • Anemia    • Arthritis    • CHF (congestive heart failure) (HCC)    • Chronic kidney disease    • Coronary artery disease    • H/O blood clots    • History of transfusion     no reaction    • Hypertension      Past Surgical History:   Procedure Laterality Date   • ABDOMINAL SURGERY     • APPENDECTOMY     • CARDIAC SURGERY     •   SECTION     • COLONOSCOPY N/A 3/7/2018    Procedure: COLONOSCOPY;  Surgeon: Willie Gresham MD;  Location:  [facility];  Service:    • CORONARY ANGIOPLASTY WITH STENT PLACEMENT     • ENDOSCOPY N/A 3/6/2018    Procedure: ESOPHAGOGASTRODUODENOSCOPY;  Surgeon: Willie Gresham MD;  Location:  [facility];  Service:    • PACEMAKER IMPLANTATION      left side    • SKIN BIOPSY     • TUBAL ABDOMINAL LIGATION       PT Assessment (last 12 hours)     PT Evaluation and Treatment     Row Name 11/16/22 1051          Physical Therapy Time and Intention    Subjective Information complains of;weakness  -CT     Document Type therapy note (daily note)  -CT     Mode of Treatment physical therapy  -CT     Patient Effort good  -CT     Comment Pt treatment session [id number]. Asked pt if she wanted therapist to call her son and she said yes. Attempted to call pts son via [facility] with no answer. OT assisted with ambualtion and transfer. Pt in bed prior to therapy. Pt ambulated 5 ft and transfered to chair. Pt tearful during session about legs being too weak for her to ambulate further distance. Pt performed seated ther ex in chair.  -CT     Row Name 11/16/22 1055          General Information    Patient Profile Reviewed yes  -CT     Existing Precautions/Restrictions fall;other (see comments);TLSO  swan; pt declines use of TLSO  -CT     Limitations/Impairments safety/cognitive  -CT     Row Name 11/16/22 1055          Cognition    Affect/Mental Status (Cognition) WFL  -CT     Orientation Status (Cognition) oriented x 4  -CT     Follows Commands (Cognition) WFL  -CT     Row Name 11/16/22 1057          Bed Mobility    Supine-Sit [place] (Bed Mobility) standby assist;verbal cues;nonverbal cues (demo/gesture)  -CT     Bed Mobility, Safety Issues decreased use of legs for bridging/pushing;decreased use of arms for pushing/pulling;impaired trunk control for bed mobility  -CT     Assistive Device (Bed Mobility) bed rails;draw sheet   -CT     Row Name 11/16/22 1055          Transfers    Transfers sit-stand transfer;stand-sit transfer  -CT     Row Name 11/16/22 1055          Sit-Stand Transfer    Sit-Stand [place] (Transfers) moderate assist (50% patient effort);maximum assist (25% patient effort);2 person assist;verbal cues;nonverbal cues (demo/gesture)  -CT     Assistive Device (Sit-Stand Transfers) walker, front-wheeled  -CT     Row Name 11/16/22 1055          Stand-Sit Transfer    Stand-Sit [place] (Transfers) maximum assist (25% patient effort);2 person assist;verbal cues;nonverbal cues (demo/gesture)  -CT     Assistive Device (Stand-Sit Transfers) walker, front-wheeled  -CT     Row Name 11/16/22 1055          Gait/Stairs (Locomotion)    [place] Level (Gait) maximum assist (25% patient effort);2 person assist;verbal cues;nonverbal cues (demo/gesture)  -CT     Assistive Device (Gait) walker, front-wheeled  -CT     Distance in Feet (Gait) 5  -CT     Pattern (Gait) step-to  -CT     Deviations/Abnormal Patterns (Gait) gait speed decreased;festinating/shuffling;weight shifting decreased  -CT     Bilateral Gait Deviations weight shift ability decreased;forward flexed posture;knee buckling bilaterally  -CT     Comment, (Gait/Stairs) pts legs give out and she attempts to sit early before getting to chair  -CT     Row Name 11/16/22 1055          Motor Skills    Therapeutic Exercise --  BLE sitting ther ex: AP x 2, LAQ, seated marches, hip abd/add, hip abd/add resisted with yellow TB, hamstring curls yellow TB, glue sets x 2 20 reps  -CT     Row Name 11/16/22 1055          Coping    Observed Emotional State calm;cooperative  -CT     Verbalized Emotional State acceptance  -CT     Family/Support Persons family  via facetime  -CT     Row Name 11/16/22 1055          Plan of Care Review    Plan of Care Reviewed With patient  -CT     Row Name 11/16/22 1055          Positioning and Restraints    Pre-Treatment Position in bed  -CT     Post  Treatment Position chair  -CT     In Chair sitting;call light within reach;encouraged to call for assist;notified nsg;with OT  -CT           User Key  (r) = Recorded By, (t) = Taken By, (c) = Cosigned By    Initials Name Provider Type    CT Bhargavi Vásquez, EDEL Physical Therapist                Physical Therapy Education     Title: PT OT SLP Therapies (Done)     Topic: Physical Therapy (Done)     Point: Mobility training (Done)     Learning Progress Summary           Patient Acceptance, E,TB, VU by KK at 11/14/2022 1638    Acceptance, E,TB, VU by KK at 11/13/2022 1648    Acceptance, E,TB, VU by KG at 11/10/2022 1827    Acceptance, E,TB, VU by DM at 11/9/2022 1638    Acceptance, E,TB, VU by KK at 11/8/2022 1754    Acceptance, E,TB, VU by KK at 11/7/2022 1635    Acceptance, E,D, VU,NR by LL at 11/5/2022 1325    Acceptance, E, VU by EB at 11/4/2022 1448    Acceptance, E,TB, VU by KM at 11/4/2022 1101                   Point: Home exercise program (Done)     Learning Progress Summary           Patient Acceptance, E,TB, VU by KK at 11/14/2022 1638    Acceptance, E,TB, VU by KK at 11/13/2022 1648    Acceptance, E,TB, VU by KG at 11/10/2022 1827    Acceptance, E,TB, VU by DM at 11/9/2022 1638    Acceptance, E,TB, VU by KK at 11/8/2022 1754    Acceptance, E,TB, VU by KK at 11/7/2022 1635    Acceptance, E,D, VU,NR by LL at 11/5/2022 1325    Acceptance, E, VU by EB at 11/4/2022 1448    Acceptance, E,TB, VU by KM at 11/4/2022 1101                   Point: Body mechanics (Done)     Learning Progress Summary           Patient Acceptance, E,TB, VU by KK at 11/14/2022 1638    Acceptance, E,TB, VU by KK at 11/13/2022 1648    Acceptance, E,TB, VU by KG at 11/10/2022 1827    Acceptance, E,TB, VU by DM at 11/9/2022 1638    Acceptance, E,TB, VU by KK at 11/8/2022 1754    Acceptance, E,TB, VU by KK at 11/7/2022 1635    Acceptance, E,D, VU,NR by LL at 11/5/2022 1325    Acceptance, E, VU by EB at 11/4/2022 1448    Acceptance, E,TB, VU by  KM at 11/4/2022 1101                   Point: Precautions (Done)     Learning Progress Summary           Patient Acceptance, E,TB, VU by KK at 11/14/2022 1638    Acceptance, E,TB, VU by KK at 11/13/2022 1648    Acceptance, E,TB, VU by KG at 11/10/2022 1827    Acceptance, E,TB, VU by DM at 11/9/2022 1638    Acceptance, E,TB, VU by KK at 11/8/2022 1754    Acceptance, E,TB, VU by KK at 11/7/2022 1635    Acceptance, E,D, VU,NR by LL at 11/5/2022 1325    Acceptance, E, VU by EB at 11/4/2022 1448    Acceptance, E,TB, VU by KM at 11/4/2022 1101                               User Key     Initials Effective Dates Name Provider Type Discipline     05/02/16 -  Neda Garcia, PTA Physical Therapist Assistant PT    DM 06/16/21 -  Moniz, Deborah, RN Registered Nurse Nurse    KM 05/24/22 -  Mao Vázquez, PT Physical Therapist PT    EB 09/22/22 -  Cedric Oliver, RN Registered Nurse Nurse    KK 06/27/22 -  Chesli Bolivar, RN Registered Nurse Nurse    KG 09/22/22 -  Verónica Rodriguez, RN Registered Nurse Nurse              PT Recommendation and Plan  Anticipated Discharge Disposition (PT): home with 24/7 care, skilled nursing facility  Planned Therapy Interventions (PT): balance training, bed mobility training, gait training, home exercise program, manual therapy techniques, motor coordination training, neuromuscular re-education, patient/family education, postural re-education, strengthening, transfer training  Therapy Frequency (PT): 5 times/wk (5-6 times/wk)  Progress Summary (PT)  Progress Toward Functional Goals (PT): progress toward functional goals is gradual  Plan of Care Reviewed With: patient  Progress: no change       Time Calculation:    PT Charges     Row Name 11/16/22 1104             Time Calculation    Start Time 1002  -CT      Stop Time 1043  -CT      Time Calculation (min) 41 min  -CT      PT Received On 11/16/22  -CT            User Key  (r) = Recorded By, (t) = Taken By, (c) = Cosigned By    Initials Name  Provider Type    CT Bhargavi Vásquez PT Physical Therapist              Therapy Charges for Today     Code Description Service Date Service Provider Modifiers Qty    56512955627 HC GAIT TRAINING EA 15 MIN 11/15/2022 Bhargavi Vásquez, PT GP 1    27052998559 HC PT THER PROC EA 15 MIN 11/15/2022 Bhargavi Vásquez, PT GP 1    73544815906 HC PT THERAPEUTIC ACT EA 15 MIN 11/15/2022 Bhargavi Vásquez, PT GP 1    68594334684 HC GAIT TRAINING EA 15 MIN 2022 Bhargavi Vásquez, PT GP 1    36513568656 HC PT THER PROC EA 15 MIN 2022 Bhargavi Vásquez, PT GP 1    05474526024 HC PT THERAPEUTIC ACT EA 15 MIN 2022 Bhargavi Vásquez, PT GP 1          PT G-Codes  Outcome Measure Options: AM-PAC 6 Clicks Basic Mobility (PT)  AM-PAC 6 Clicks Score (PT): (P) 10  AM-PAC 6 Clicks Score (OT): 16    Bhargavi Vásquez PT  2022      Electronically signed by Bhargavi Vásquez PT at 22 1106          Occupational Therapy Notes (most recent note)      Татьяна Bah, OT at 22 1235          Acute Care - Occupational Therapy Treatment Note  [facility]     Patient Name: Tre Kinney  : 1932  MRN: [id number]  Today's Date: 2022  Onset of Illness/Injury or Date of Surgery: 10/06/22     Referring Physician: Janneth    Admit Date: 10/5/2022     No diagnosis found.  Patient Active Problem List   Diagnosis   • Acid reflux   • Ankle arthralgia   • Cardiac conduction disorder   • Closed fracture of distal fibula   • Arteriosclerosis of coronary artery   • Fracture of distal end of tibia   • Elevated cholesterol   • BP (high blood pressure)   • Hypertrophic polyarthritis   • OP (osteoporosis)   • Right heart failure (HCC)   • Heart failure, chronic, right-sided (HCC)   • SOB (shortness of breath)   • Palpitations   • CHF (congestive heart failure) (HCC)   • Congestive heart failure (HCC)   • Precordial pain   • Chronic anticoagulation   • Hypokalemia   • Anemia, unspecified type   • Generalized weakness     Past Medical  History:   Diagnosis Date   • Anemia    • Arthritis    • CHF (congestive heart failure) (HCC)    • Chronic kidney disease    • Coronary artery disease    • H/O blood clots    • History of transfusion     no reaction    • Hypertension      Past Surgical History:   Procedure Laterality Date   • ABDOMINAL SURGERY     • APPENDECTOMY     • CARDIAC SURGERY     •  SECTION     • COLONOSCOPY N/A 3/7/2018    Procedure: COLONOSCOPY;  Surgeon: Willie Gresham MD;  Location:  [facility];  Service:    • CORONARY ANGIOPLASTY WITH STENT PLACEMENT     • ENDOSCOPY N/A 3/6/2018    Procedure: ESOPHAGOGASTRODUODENOSCOPY;  Surgeon: Willie Gresham MD;  Location:  [facility];  Service:    • PACEMAKER IMPLANTATION      left side    • SKIN BIOPSY     • TUBAL ABDOMINAL LIGATION           OT ASSESSMENT FLOWSHEET (last 12 hours)     OT Evaluation and Treatment     Row Name 22 1045                   OT Time and Intention    Subjective Information complains of;weakness;fatigue  -LM        Document Type therapy note (daily note)  -LM        Mode of Treatment occupational therapy  -LM        Patient Effort good  -LM        Comment Patient seen this date for light BUE therex, ADL retraining, and fxl activity tolerance from 10:45-11:10.  Attempted to Facetime Willian giang x 2 with no answer.  Patient completed bue arom/therex while seated in bedside recliner.  Yellow tband therex, light bue towel therex.  Patient also able to doff socks while seated and doff slip on shoes with supervision this date.  Frequent rest breaks.  OT assisted PT prior to OT session for fxl mobility.  -LM           General Information    Existing Precautions/Restrictions fall  -LM           Cognition    Affect/Mental Status (Cognition) WFL  -LM        Orientation Status (Cognition) oriented x 4  -LM           Positioning and Restraints    Post Treatment Position chair  -LM        In Chair encouraged to call for assist;call light within reach  -LM         On BS commode exit alarm on  -LM              User Key  (r) = Recorded By, (t) = Taken By, (c) = Cosigned By    Initials Name Effective Dates    LM Татьяна Bah OT 06/16/21 -                        OT Recommendation and Plan           Outcome Measures     Row Name 11/16/22 1110             How much help from another is currently needed...    Putting on and taking off regular lower body clothing? 2  -LM      Bathing (including washing, rinsing, and drying) 2  -LM      Toileting (which includes using toilet bed pan or urinal) 2  -LM      Putting on and taking off regular upper body clothing 3  -LM      Taking care of personal grooming (such as brushing teeth) 3  -LM      Eating meals 4  -LM      AM-PAC 6 Clicks Score (OT) 16  -LM            User Key  (r) = Recorded By, (t) = Taken By, (c) = Cosigned By    Initials Name Provider Type    LM Татьяна Bah OT Occupational Therapist                Time Calculation:    Time Calculation- OT     Row Name 11/16/22 1235             Time Calculation- OT    Total Timed Code Minutes- OT 25 minute(s)  -LM            User Key  (r) = Recorded By, (t) = Taken By, (c) = Cosigned By    Initials Name Provider Type    LM Татьяна Bah OT Occupational Therapist              Therapy Charges for Today     Code Description Service Date Service Provider Modifiers Qty    78002299794  OT THER PROC EA 15 MIN 11/16/2022 Татьяна Bah OT GO 2               Татьяна Bah OT  11/16/2022    Electronically signed by Татьяна Bah OT at 11/16/22 1236       Speech Language Pathology Notes (most recent note)    No notes exist for this encounter.         Respiratory Therapy Notes (most recent note)    No notes exist for this encounter.         ADL Documentation (most recent)    Flowsheet Row Most Recent Value   Transferring 3 - assistive equipment and person   Toileting 3 - assistive equipment and person   Bathing 2 - assistive person   Dressing 2 - assistive person   Eating 0 - independent    Communication 0 - understands/communicates without difficulty   Swallowing 2 - difficulty swallowing foods   Equipment Currently Used at Home wheelchair, commode, walker, rolling, oxygen          Discharge Summary    No notes of this type exist for this encounter.         Discharge Order (From admission, onward)    None

## 2022-11-17 LAB
ANION GAP SERPL CALCULATED.3IONS-SCNC: 11.1 MMOL/L (ref 5–15)
BASOPHILS # BLD AUTO: 0.07 10*3/MM3 (ref 0–0.2)
BASOPHILS NFR BLD AUTO: 0.9 % (ref 0–1.5)
BUN SERPL-MCNC: 25 MG/DL (ref 8–23)
BUN/CREAT SERPL: 13.4 (ref 7–25)
CALCIUM SPEC-SCNC: 8.4 MG/DL (ref 8.6–10.5)
CHLORIDE SERPL-SCNC: 101 MMOL/L (ref 98–107)
CO2 SERPL-SCNC: 26.9 MMOL/L (ref 22–29)
CREAT SERPL-MCNC: 1.86 MG/DL (ref 0.57–1)
DEPRECATED RDW RBC AUTO: 51 FL (ref 37–54)
EGFRCR SERPLBLD CKD-EPI 2021: 25.6 ML/MIN/1.73
EOSINOPHIL # BLD AUTO: 0.54 10*3/MM3 (ref 0–0.4)
EOSINOPHIL NFR BLD AUTO: 7.1 % (ref 0.3–6.2)
ERYTHROCYTE [DISTWIDTH] IN BLOOD BY AUTOMATED COUNT: 14.8 % (ref 12.3–15.4)
GLUCOSE SERPL-MCNC: 102 MG/DL (ref 65–99)
HCT VFR BLD AUTO: 29.9 % (ref 34–46.6)
HGB BLD-MCNC: 9.7 G/DL (ref 12–15.9)
IMM GRANULOCYTES # BLD AUTO: 0.03 10*3/MM3 (ref 0–0.05)
IMM GRANULOCYTES NFR BLD AUTO: 0.4 % (ref 0–0.5)
LYMPHOCYTES # BLD AUTO: 1.41 10*3/MM3 (ref 0.7–3.1)
LYMPHOCYTES NFR BLD AUTO: 18.7 % (ref 19.6–45.3)
MCH RBC QN AUTO: 30.3 PG (ref 26.6–33)
MCHC RBC AUTO-ENTMCNC: 32.4 G/DL (ref 31.5–35.7)
MCV RBC AUTO: 93.4 FL (ref 79–97)
MONOCYTES # BLD AUTO: 0.67 10*3/MM3 (ref 0.1–0.9)
MONOCYTES NFR BLD AUTO: 8.9 % (ref 5–12)
NEUTROPHILS NFR BLD AUTO: 4.84 10*3/MM3 (ref 1.7–7)
NEUTROPHILS NFR BLD AUTO: 64 % (ref 42.7–76)
NRBC BLD AUTO-RTO: 0 /100 WBC (ref 0–0.2)
PLATELET # BLD AUTO: 236 10*3/MM3 (ref 140–450)
PMV BLD AUTO: 9.8 FL (ref 6–12)
POTASSIUM SERPL-SCNC: 3.8 MMOL/L (ref 3.5–5.2)
RBC # BLD AUTO: 3.2 10*6/MM3 (ref 3.77–5.28)
SODIUM SERPL-SCNC: 139 MMOL/L (ref 136–145)
WBC NRBC COR # BLD: 7.56 10*3/MM3 (ref 3.4–10.8)

## 2022-11-17 PROCEDURE — 85025 COMPLETE CBC W/AUTO DIFF WBC: CPT | Performed by: INTERNAL MEDICINE

## 2022-11-17 PROCEDURE — 97110 THERAPEUTIC EXERCISES: CPT

## 2022-11-17 PROCEDURE — 97530 THERAPEUTIC ACTIVITIES: CPT

## 2022-11-17 PROCEDURE — 80048 BASIC METABOLIC PNL TOTAL CA: CPT | Performed by: INTERNAL MEDICINE

## 2022-11-17 PROCEDURE — 97116 GAIT TRAINING THERAPY: CPT

## 2022-11-17 RX ADMIN — Medication 10 ML: at 09:12

## 2022-11-17 RX ADMIN — TAMSULOSIN HYDROCHLORIDE 0.4 MG: 0.4 CAPSULE ORAL at 09:12

## 2022-11-17 RX ADMIN — FUROSEMIDE 40 MG: 40 TABLET ORAL at 18:40

## 2022-11-17 RX ADMIN — Medication 1 TABLET: at 09:12

## 2022-11-17 RX ADMIN — APIXABAN 5 MG: 5 TABLET, FILM COATED ORAL at 09:12

## 2022-11-17 RX ADMIN — Medication 10 ML: at 21:02

## 2022-11-17 RX ADMIN — ESCITALOPRAM 10 MG: 10 TABLET, FILM COATED ORAL at 09:12

## 2022-11-17 RX ADMIN — HYDRALAZINE HYDROCHLORIDE 5 MG: 25 TABLET, FILM COATED ORAL at 21:01

## 2022-11-17 RX ADMIN — HYDRALAZINE HYDROCHLORIDE 5 MG: 25 TABLET, FILM COATED ORAL at 13:52

## 2022-11-17 RX ADMIN — HYDRALAZINE HYDROCHLORIDE 5 MG: 25 TABLET, FILM COATED ORAL at 06:19

## 2022-11-17 RX ADMIN — PANTOPRAZOLE SODIUM 40 MG: 40 TABLET, DELAYED RELEASE ORAL at 06:19

## 2022-11-17 RX ADMIN — METOPROLOL TARTRATE 25 MG: 25 TABLET, FILM COATED ORAL at 09:12

## 2022-11-17 RX ADMIN — APIXABAN 5 MG: 5 TABLET, FILM COATED ORAL at 21:01

## 2022-11-17 RX ADMIN — Medication 10 MG: at 21:01

## 2022-11-17 RX ADMIN — FUROSEMIDE 40 MG: 40 TABLET ORAL at 09:12

## 2022-11-17 RX ADMIN — METOPROLOL TARTRATE 25 MG: 25 TABLET, FILM COATED ORAL at 21:01

## 2022-11-17 RX ADMIN — GABAPENTIN 300 MG: 300 CAPSULE ORAL at 21:01

## 2022-11-17 NOTE — THERAPY TREATMENT NOTE
Acute Care - Occupational Therapy Treatment Note   [facility]     Patient Name: Tre Kinney  : 1932  MRN: [id number]  Today's Date: 2022  Onset of Illness/Injury or Date of Surgery: 10/06/22     Referring Physician: Janneth    Admit Date: 10/5/2022     No diagnosis found.  Patient Active Problem List   Diagnosis   • Acid reflux   • Ankle arthralgia   • Cardiac conduction disorder   • Closed fracture of distal fibula   • Arteriosclerosis of coronary artery   • Fracture of distal end of tibia   • Elevated cholesterol   • BP (high blood pressure)   • Hypertrophic polyarthritis   • OP (osteoporosis)   • Right heart failure (HCC)   • Heart failure, chronic, right-sided (HCC)   • SOB (shortness of breath)   • Palpitations   • CHF (congestive heart failure) (HCC)   • Congestive heart failure (HCC)   • Precordial pain   • Chronic anticoagulation   • Hypokalemia   • Anemia, unspecified type   • Generalized weakness     Past Medical History:   Diagnosis Date   • Anemia    • Arthritis    • CHF (congestive heart failure) ([facility])    • Chronic kidney disease    • Coronary artery disease    • H/O blood clots    • History of transfusion     no reaction    • Hypertension      Past Surgical History:   Procedure Laterality Date   • ABDOMINAL SURGERY     • APPENDECTOMY     • CARDIAC SURGERY     •  SECTION     • COLONOSCOPY N/A 3/7/2018    Procedure: COLONOSCOPY;  Surgeon: Willie Gresham MD;  Location:  [facility];  Service:    • CORONARY ANGIOPLASTY WITH STENT PLACEMENT     • ENDOSCOPY N/A 3/6/2018    Procedure: ESOPHAGOGASTRODUODENOSCOPY;  Surgeon: Willie Gresham MD;  Location:  [facility];  Service:    • PACEMAKER IMPLANTATION      left side    • SKIN BIOPSY     • TUBAL ABDOMINAL LIGATION           OT ASSESSMENT FLOWSHEET (last 12 hours)     OT Evaluation and Treatment     Row Name 22 1335                   OT Time and Intention    Subjective Information complains of;weakness;fatigue;pain  -LM         Document Type therapy note (daily note)  -LM        Mode of Treatment occupational therapy  -LM        Patient Effort good  -LM        Comment Patient seen from [id number] for PM OT session for light bue therex/TA.  Patient performed yellow tband therex while seated in bedside recliner.  Patient performed light bue arom against gravity with frequent rest breaks.  Performed light home management task of folding linens using BUE.  Frequent rest breaks.  Son, Leno present and Willian on facetime for half of session.  OT did assist PT prior to session with fxl mobility.  Patient required max assist/total assist for fxl transfer.  Patient did c/o of Left hip pain, notified RN.  -LM           General Information    Existing Precautions/Restrictions fall  -LM           Cognition    Affect/Mental Status (Cognition) WFL  -LM        Orientation Status (Cognition) oriented x 3  -LM           Positioning and Restraints    Post Treatment Position chair  -LM        In Chair call light within reach;encouraged to call for assist  -LM              User Key  (r) = Recorded By, (t) = Taken By, (c) = Cosigned By    Initials Name Effective Dates    LM Татьяна Bah OT 06/16/21 -                        OT Recommendation and Plan           Outcome Measures     Row Name 11/17/22 1433 11/16/22 1110          How much help from another is currently needed...    Putting on and taking off regular lower body clothing? 2  -LM 2  -LM     Bathing (including washing, rinsing, and drying) 2  -LM 2  -LM     Toileting (which includes using toilet bed pan or urinal) 2  -LM 2  -LM     Putting on and taking off regular upper body clothing 3  -LM 3  -LM     Taking care of personal grooming (such as brushing teeth) 3  -LM 3  -LM     Eating meals 4  -LM 4  -LM     AM-PAC 6 Clicks Score (OT) 16  -LM 16  -LM           User Key  (r) = Recorded By, (t) = Taken By, (c) = Cosigned By    Initials Name Provider Type    Татьяна Mccabe, OT Occupational Therapist                 Time Calculation:    Time Calculation- OT     Row Name 11/17/22 1433             Time Calculation- OT    Total Timed Code Minutes- OT 30 minute(s)  -SHAHBAZ            User Key  (r) = Recorded By, (t) = Taken By, (c) = Cosigned By    Initials Name Provider Type    Татьяна Mccabe OT Occupational Therapist              Therapy Charges for Today     Code Description Service Date Service Provider Modifiers Qty    97950703862 HC OT THER PROC EA 15 MIN 11/16/2022 Татьяна Bah OT GO 2    15160577240 HC OT THER PROC EA 15 MIN 11/17/2022 Татьяна Bah, OT GO 1    46927464852 HC OT THERAPEUTIC ACT EA 15 MIN 11/17/2022 Татьяна Bah OT GO 1               Татьяна Bah OT  11/17/2022

## 2022-11-17 NOTE — THERAPY TREATMENT NOTE
Acute Care - Physical Therapy Treatment Note   [place]     Patient Name: Tre Kinney  : 1932  MRN: [id number]  Today's Date: 2022   Onset of Illness/Injury or Date of Surgery: 10/06/22  Visit Dx:   No diagnosis found.  Patient Active Problem List   Diagnosis   • Acid reflux   • Ankle arthralgia   • Cardiac conduction disorder   • Closed fracture of distal fibula   • Arteriosclerosis of coronary artery   • Fracture of distal end of tibia   • Elevated cholesterol   • BP (high blood pressure)   • Hypertrophic polyarthritis   • OP (osteoporosis)   • Right heart failure (HCC)   • Heart failure, chronic, right-sided (HCC)   • SOB (shortness of breath)   • Palpitations   • CHF (congestive heart failure) (HCC)   • Congestive heart failure (HCC)   • Precordial pain   • Chronic anticoagulation   • Hypokalemia   • Anemia, unspecified type   • Generalized weakness     Past Medical History:   Diagnosis Date   • Anemia    • Arthritis    • CHF (congestive heart failure) (HCC)    • Chronic kidney disease    • Coronary artery disease    • H/O blood clots    • History of transfusion     no reaction    • Hypertension      Past Surgical History:   Procedure Laterality Date   • ABDOMINAL SURGERY     • APPENDECTOMY     • CARDIAC SURGERY     •  SECTION     • COLONOSCOPY N/A 3/7/2018    Procedure: COLONOSCOPY;  Surgeon: Willie Gresham MD;  Location:  [facility];  Service:    • CORONARY ANGIOPLASTY WITH STENT PLACEMENT     • ENDOSCOPY N/A 3/6/2018    Procedure: ESOPHAGOGASTRODUODENOSCOPY;  Surgeon: Willie Gresham MD;  Location:  [facility];  Service:    • PACEMAKER IMPLANTATION      left side    • SKIN BIOPSY     • TUBAL ABDOMINAL LIGATION       PT Assessment (last 12 hours)     PT Evaluation and Treatment     Row Name 22 1535          Physical Therapy Time and Intention    Subjective Information complains of;weakness;fatigue;pain  -CT     Document Type therapy note (daily note)  -CT     Mode of  Treatment physical therapy  -CT     Patient Effort good  -CT     Comment Pt treatment session [id number]. Pelon Dorsey via facetime and pelon Burr in room at the end of session. OT assisted with standing and transfers. Pt c/o L hip/leg pain. RN notified. Pt performed sit to stand x 3 today but was unable to ambulate. Stand pivot transfer to chair. Pt performed seated ther ex.  -CT     Row Name 11/17/22 1535          General Information    Patient Profile Reviewed yes  -CT     Existing Precautions/Restrictions fall;other (see comments);TLSO  swan; pt declines use of TLSO  -CT     Limitations/Impairments safety/cognitive  -CT     Row Name 11/17/22 1532          Cognition    Affect/Mental Status (Cognition) WFL  -CT     Orientation Status (Cognition) oriented x 4  -CT     Follows Commands (Cognition) WFL  -CT     Row Name 11/17/22 1537          Bed Mobility    Supine-Sit Horton (Bed Mobility) contact guard;minimum assist (75% patient effort)  Min A to get to EOB  -CT     Bed Mobility, Safety Issues decreased use of legs for bridging/pushing;decreased use of arms for pushing/pulling;impaired trunk control for bed mobility  -CT     Assistive Device (Bed Mobility) bed rails;draw sheet  -CT     Row Name 11/17/22 1533          Transfers    Transfers sit-stand transfer;stand-sit transfer  -CT     Row Name 11/17/22 1534          Bed-Chair Transfer    Bed-Chair Horton (Transfers) maximum assist (25% patient effort);dependent (less than 25% patient effort);2 person assist;verbal cues;nonverbal cues (demo/gesture)  -CT     Assistive Device (Bed-Chair Transfers) walker, front-wheeled  -CT     Row Name 11/17/22 1539          Sit-Stand Transfer    Sit-Stand Horton (Transfers) maximum assist (25% patient effort);dependent (less than 25% patient effort);2 person assist;verbal cues;nonverbal cues (demo/gesture)  -CT     Assistive Device (Sit-Stand Transfers) walker, front-wheeled  -CT     Row Name 11/17/22 1531           Stand-Sit Transfer    Stand-Sit Lamb (Transfers) maximum assist (25% patient effort);dependent (less than 25% patient effort);2 person assist;verbal cues;nonverbal cues (demo/gesture)  -CT     Assistive Device (Stand-Sit Transfers) walker, front-wheeled  -CT     Row Name 11/17/22 1535          Gait/Stairs (Locomotion)    Comment, (Gait/Stairs) pt was unable to take steps this date d/t c/o L hip/leg pain  -CT     Row Name 11/17/22 1535          Motor Skills    Therapeutic Exercise --  BLE sitting ther ex: LAQ, AP, seated marches, glut sets x 2, hamstring curls red TB, hip abd/add, hip abd/add resisted red TB, resisted pillow squeeze 25-30 reps each  -CT     Row Name 11/17/22 1535          Coping    Observed Emotional State calm;cooperative  -CT     Verbalized Emotional State acceptance  -CT     Family/Support Persons family  via facetime  -CT     Row Name 11/17/22 1535          Plan of Care Review    Plan of Care Reviewed With patient  -CT     Row Name 11/17/22 1535          Positioning and Restraints    Pre-Treatment Position in bed  -CT     Post Treatment Position chair  -CT     In Chair sitting;call light within reach;encouraged to call for assist;with family/caregiver;with OT  -CT           User Key  (r) = Recorded By, (t) = Taken By, (c) = Cosigned By    Initials Name Provider Type    CT Bhargavi Vásquez PT Physical Therapist                Physical Therapy Education     Title: PT OT SLP Therapies (Done)     Topic: Physical Therapy (Done)     Point: Mobility training (Done)     Learning Progress Summary           Patient Acceptance, E,TB, VU by RH at 11/16/2022 1110    Acceptance, E,TB, VU by KK at 11/14/2022 1638    Acceptance, E,TB, VU by KK at 11/13/2022 1648    Acceptance, E,TB, VU by KG at 11/10/2022 1827    Acceptance, E,TB, VU by DM at 11/9/2022 1638    Acceptance, E,TB, VU by KK at 11/8/2022 1754    Acceptance, E,TB, VU by KK at 11/7/2022 1635    Acceptance, E,D, VU,NR by LL at 11/5/2022 1325     Acceptance, E, VU by EB at 11/4/2022 1448    Acceptance, E,TB, VU by KM at 11/4/2022 1101                   Point: Home exercise program (Done)     Learning Progress Summary           Patient Acceptance, E,TB, VU by RH at 11/16/2022 1110    Acceptance, E,TB, VU by KK at 11/14/2022 1638    Acceptance, E,TB, VU by KK at 11/13/2022 1648    Acceptance, E,TB, VU by KG at 11/10/2022 1827    Acceptance, E,TB, VU by DM at 11/9/2022 1638    Acceptance, E,TB, VU by KK at 11/8/2022 1754    Acceptance, E,TB, VU by KK at 11/7/2022 1635    Acceptance, E,D, VU,NR by LL at 11/5/2022 1325    Acceptance, E, VU by EB at 11/4/2022 1448    Acceptance, E,TB, VU by KM at 11/4/2022 1101                   Point: Body mechanics (Done)     Learning Progress Summary           Patient Acceptance, E,TB, VU by RH at 11/16/2022 1110    Acceptance, E,TB, VU by KK at 11/14/2022 1638    Acceptance, E,TB, VU by KK at 11/13/2022 1648    Acceptance, E,TB, VU by KG at 11/10/2022 1827    Acceptance, E,TB, VU by DM at 11/9/2022 1638    Acceptance, E,TB, VU by KK at 11/8/2022 1754    Acceptance, E,TB, VU by KK at 11/7/2022 1635    Acceptance, E,D, VU,NR by LL at 11/5/2022 1325    Acceptance, E, VU by EB at 11/4/2022 1448    Acceptance, E,TB, VU by KM at 11/4/2022 1101                   Point: Precautions (Done)     Learning Progress Summary           Patient Acceptance, E,TB, VU by RH at 11/16/2022 1110    Acceptance, E,TB, VU by KK at 11/14/2022 1638    Acceptance, E,TB, VU by KK at 11/13/2022 1648    Acceptance, E,TB, VU by KG at 11/10/2022 1827    Acceptance, E,TB, VU by DM at 11/9/2022 1638    Acceptance, E,TB, VU by KK at 11/8/2022 1754    Acceptance, E,TB, VU by KK at 11/7/2022 1635    Acceptance, E,D, VU,NR by LL at 11/5/2022 1325    Acceptance, E, VU by EB at 11/4/2022 1448    Acceptance, E,TB, VU by KM at 11/4/2022 1101                               User Key     Initials Effective Dates Name Provider Type Discipline    CELESTINO 05/02/16 -  Jose  Neda, PTA Physical Therapist Assistant PT    DM 06/16/21 -  Moniz, Deborah, RN Registered Nurse Nurse    KM 05/24/22 -  Mao Vázquez PT Physical Therapist PT    EB 09/22/22 -  Cedric Oliver, RN Registered Nurse Nurse    KK 06/27/22 -  Chelsi Bolivar, RN Registered Nurse Nurse    KG 09/22/22 -  Verónica Rodriguez, RN Registered Nurse Nurse    RH 08/03/22 -  Jung Russell RN [facility] Registered Nurse Nurse              PT Recommendation and Plan  Anticipated Discharge Disposition (PT): home with 24/7 care, skilled nursing facility  Planned Therapy Interventions (PT): balance training, bed mobility training, gait training, home exercise program, manual therapy techniques, motor coordination training, neuromuscular re-education, patient/family education, postural re-education, strengthening, transfer training  Therapy Frequency (PT): 5 times/wk (5-6 times/wk)  Progress Summary (PT)  Progress Toward Functional Goals (PT): progress toward functional goals is gradual  Plan of Care Reviewed With: patient  Progress: no change   Outcome Measures     Row Name 11/17/22 1433 11/16/22 1110          How much help from another is currently needed...    Putting on and taking off regular lower body clothing? 2  -LM 2  -LM     Bathing (including washing, rinsing, and drying) 2  -LM 2  -LM     Toileting (which includes using toilet bed pan or urinal) 2  -LM 2  -LM     Putting on and taking off regular upper body clothing 3  -LM 3  -LM     Taking care of personal grooming (such as brushing teeth) 3  -LM 3  -LM     Eating meals 4  -LM 4  -LM     AM-PAC 6 Clicks Score (OT) 16  -LM 16  -LM           User Key  (r) = Recorded By, (t) = Taken By, (c) = Cosigned By    Initials Name Provider Type    LM Татьяна Bah, OT Occupational Therapist                 Time Calculation:    PT Charges     Row Name 11/17/22 1547             Time Calculation    Start Time 1254  -CT      Stop Time 1335  -CT      Time Calculation (min) 41 min  -CT      PT  Received On 11/17/22  -CT            User Key  (r) = Recorded By, (t) = Taken By, (c) = Cosigned By    Initials Name Provider Type    CT Bhargavi Vásquez, PT Physical Therapist              Therapy Charges for Today     Code Description Service Date Service Provider Modifiers Qty    65039845070 HC GAIT TRAINING EA 15 MIN 11/16/2022 Bhargavi Vásquez, PT GP 1    21675099564 HC PT THER PROC EA 15 MIN 11/16/2022 Bhargavi Vásquez, PT GP 1    55777604866 HC PT THERAPEUTIC ACT EA 15 MIN 11/16/2022 Bhargavi Vásquez, PT GP 1    49794854085 HC GAIT TRAINING EA 15 MIN 11/17/2022 Bhargavi Vásquez, PT GP 1    85313865665 HC PT THER PROC EA 15 MIN 11/17/2022 Bhargavi Vásquez, PT GP 1    73834961238 HC PT THERAPEUTIC ACT EA 15 MIN 11/17/2022 Bhargavi Vásquez, PT GP 1          PT G-Codes  Outcome Measure Options: AM-PAC 6 Clicks Basic Mobility (PT)  AM-PAC 6 Clicks Score (PT): 10  AM-PAC 6 Clicks Score (OT): 16    Bhargavi Vásquez PT  11/17/2022

## 2022-11-17 NOTE — PROGRESS NOTES
Ms. Kinney is our 88 yo F with hx CHF, chronic kidney disease, coronary artery disease s/p stenting, history of blood clots, chronic anticoagulation with Coumadin, SSS s/p pacemaker placement, chronic diastolic CHF, and iron deficiency anemia who initially presented here after a fall. Patient found to have traumatic hematoma of right hip. Patient found to be anemic requiring 3 units of pRBCs. INR was high on presentation and reversed. Patient had DIANA that stabilized. Patient with chronic vertebral and rib fractures that were evaluated by orthopedic surgery. Patient also had volume overolad requiring diuresis during admission. Patient now admitted to swing bed for debility. Apixiban has been restarted, patient tolerating with stable hemoglobin on 11/17 of 9.7. Chart reviewed, nurse from overnight and patient's daytime nurse had no concerns. Labs, vitals reviewed. Patient's hypertension improved with increased metoprolol. Will repeat BMP and CBC stable. Swing bed stay has been extended through 11/26. Patient continues to work well with PT/OT.

## 2022-11-17 NOTE — PLAN OF CARE
Goal Outcome Evaluation:           Progress: no change  Outcome Evaluation: Patient has been able to work with PT and OT, patient has also transfered to the chair with assist from nursing staff. Patient voices no complaints or concerns at this time

## 2022-11-17 NOTE — PLAN OF CARE
Goal Outcome Evaluation:              Outcome Evaluation: Pt resting in bed at this time. No complaints of pain or discomfort. Will continue POC.

## 2022-11-17 NOTE — CASE MANAGEMENT/SOCIAL WORK
Discharge Planning Assessment   [place]     Patient Name: Tre Kinney  MRN: [id number]  Today's Date: 11/17/2022    Admit Date: 10/5/2022    Plan: SS contacted [facility] Chana who states pre-authorization with pt's insurance was started this morning. [facility] is required to do a pre-auth with pt's insurance prior to admission, regardless if pre-auth is approved or denied. Pt is approved by her insurance for skilled care (skilled nursing facility or swing bed) through 11/26/22. The [facility] Chana is aware that pt is currently admitted to swing bed (skilled care). Pt's family will need to assist pt with applying for long-term care Medicaid after she is admitted at a nursing home due to long-term care Medicaid requirements. SS to follow.     Discharge Plan     Row Name 11/17/22 1339       Plan    Plan SS contacted [facility] per Chana who states pre-authorization with pt's insurance was started this morning. [facility] is required to do a pre-auth with pt's insurance prior to admission, regardless if pre-auth is approved or denied. Pt is approved by her insurance for skilled care (skilled nursing facility or swing bed) through 11/26/22. The [facility] Chana is aware that pt is currently admitted to swing bed (skilled care). Pt's family will need to assist pt with applying for long-term care Medicaid after she is admitted at a nursing home due to long-term care Medicaid requirements. SS to follow.              Continued Care and Services - Admitted Since 10/5/2022     Destination     Service Provider Request Status Selected Services Address Phone Fax Patient Preferred    [facility] Accepted N/A [address] [address] [phone] --    [facility] Pending - Request Sent N/A [address] [address] [phone] --    [facility] Declined  Does not have a secondary  payor source N/A [address] [place] [address] [phone] --            Selected Continued Care - Prior Encounters Includes continued care and service providers with selected services from prior encounters from 7/7/2022 to 11/17/2022    Discharged on 10/5/2022 Admission date: 9/19/2022 - Discharge disposition: Swing Bed    Destination     Service Provider Selected Services Address Phone Fax Patient Preferred    [facility] [address] [place] [address] [place] [id number] [phone] -- --                    LENORE Koch

## 2022-11-18 LAB
BACTERIA UR QL AUTO: ABNORMAL /HPF
BILIRUB UR QL STRIP: NEGATIVE
CLARITY UR: ABNORMAL
COLOR UR: YELLOW
GLUCOSE UR STRIP-MCNC: NEGATIVE MG/DL
HGB UR QL STRIP.AUTO: ABNORMAL
HYALINE CASTS UR QL AUTO: ABNORMAL /LPF
KETONES UR QL STRIP: NEGATIVE
LEUKOCYTE ESTERASE UR QL STRIP.AUTO: ABNORMAL
NITRITE UR QL STRIP: NEGATIVE
PH UR STRIP.AUTO: 6.5 [PH] (ref 5–8)
PROT UR QL STRIP: ABNORMAL
RBC # UR STRIP: ABNORMAL /HPF
REF LAB TEST METHOD: ABNORMAL
SP GR UR STRIP: 1.01 (ref 1–1.03)
SQUAMOUS #/AREA URNS HPF: ABNORMAL /HPF
UROBILINOGEN UR QL STRIP: ABNORMAL
WBC # UR STRIP: ABNORMAL /HPF

## 2022-11-18 PROCEDURE — 81001 URINALYSIS AUTO W/SCOPE: CPT | Performed by: INTERNAL MEDICINE

## 2022-11-18 PROCEDURE — 97530 THERAPEUTIC ACTIVITIES: CPT

## 2022-11-18 PROCEDURE — 97110 THERAPEUTIC EXERCISES: CPT

## 2022-11-18 PROCEDURE — 87040 BLOOD CULTURE FOR BACTERIA: CPT | Performed by: INTERNAL MEDICINE

## 2022-11-18 PROCEDURE — 99308 SBSQ NF CARE LOW MDM 20: CPT | Performed by: INTERNAL MEDICINE

## 2022-11-18 PROCEDURE — 25010000002 CEFTRIAXONE PER 250 MG: Performed by: INTERNAL MEDICINE

## 2022-11-18 PROCEDURE — 87086 URINE CULTURE/COLONY COUNT: CPT | Performed by: INTERNAL MEDICINE

## 2022-11-18 RX ADMIN — TAMSULOSIN HYDROCHLORIDE 0.4 MG: 0.4 CAPSULE ORAL at 08:30

## 2022-11-18 RX ADMIN — METOPROLOL TARTRATE 25 MG: 25 TABLET, FILM COATED ORAL at 20:55

## 2022-11-18 RX ADMIN — METOPROLOL TARTRATE 25 MG: 25 TABLET, FILM COATED ORAL at 08:30

## 2022-11-18 RX ADMIN — CEFTRIAXONE 1 G: 1 INJECTION, POWDER, FOR SOLUTION INTRAMUSCULAR; INTRAVENOUS at 22:06

## 2022-11-18 RX ADMIN — PANTOPRAZOLE SODIUM 40 MG: 40 TABLET, DELAYED RELEASE ORAL at 06:08

## 2022-11-18 RX ADMIN — Medication 10 ML: at 21:00

## 2022-11-18 RX ADMIN — APIXABAN 5 MG: 5 TABLET, FILM COATED ORAL at 20:55

## 2022-11-18 RX ADMIN — Medication 1 TABLET: at 08:30

## 2022-11-18 RX ADMIN — Medication 10 ML: at 08:30

## 2022-11-18 RX ADMIN — GABAPENTIN 300 MG: 300 CAPSULE ORAL at 20:56

## 2022-11-18 RX ADMIN — HYDRALAZINE HYDROCHLORIDE 5 MG: 25 TABLET, FILM COATED ORAL at 14:29

## 2022-11-18 RX ADMIN — FUROSEMIDE 40 MG: 40 TABLET ORAL at 17:15

## 2022-11-18 RX ADMIN — HYDRALAZINE HYDROCHLORIDE 5 MG: 25 TABLET, FILM COATED ORAL at 20:57

## 2022-11-18 RX ADMIN — HYDROCODONE BITARTRATE AND ACETAMINOPHEN 1 TABLET: 7.5; 325 TABLET ORAL at 06:07

## 2022-11-18 RX ADMIN — Medication 10 MG: at 20:56

## 2022-11-18 RX ADMIN — HYDRALAZINE HYDROCHLORIDE 5 MG: 25 TABLET, FILM COATED ORAL at 06:08

## 2022-11-18 RX ADMIN — APIXABAN 5 MG: 5 TABLET, FILM COATED ORAL at 08:30

## 2022-11-18 RX ADMIN — ESCITALOPRAM 10 MG: 10 TABLET, FILM COATED ORAL at 08:30

## 2022-11-18 RX ADMIN — FUROSEMIDE 40 MG: 40 TABLET ORAL at 08:30

## 2022-11-18 NOTE — PLAN OF CARE
Goal Outcome Evaluation:  Plan of Care Reviewed With: patient        Progress: no change  Outcome Evaluation: Patient's VSS. Pt declined to get up in the chair for nursing staff. Pt worked with PT & OT. Pt complained of burning when urinating & left hip pain MD aware & orders were placed. Pt voiced no further concerns at this time.

## 2022-11-18 NOTE — CASE MANAGEMENT/SOCIAL WORK
Discharge Planning Assessment   [place]     Patient Name: Tre Kinney  MRN: [id number]  Today's Date: 11/18/2022    Admit Date: 10/5/2022       Discharge Plan     Row Name 11/18/22 1107       Plan    Plan SS received a call from [facility] per Juice. [employer]placement approved pre-auth through 11/28/22 for pt to be admitted to [facility]. [facility] can admit pt to day. SS spoke to pt with coordinator, Maria Alejandra present and pt requested for Leno bird to be notified. Pt is agreeable to be discharged to [facility] if Leno bird is agreeable. SS contacted Leno bird per pt's request. Leno Bird was educated about pre-auth and long-term care Medicaid. SS advised Leno bird to contact [facility] per Elina Leyva to further discuss long-term Medicaid and financial information. SS provided Leno bird with contact information for Elina Leyva. Leno Bird voices plans to call his two brother's. Leno Bird to call SS back. SS to follow.    13:35: SS received call from Leno bird and daughter in law, Chantel. Leno Bird spoke to [facility] home per Elina. Son informed SS that [facility] nursing home per Elina plans to discuss daily copay with their corporate and call him back. SS informed son and dtr in law that the pt is also being charged the same daily copay rate for swing bed at [facility] as she would be at a nursing home. Son and dtr in law are aware of this. Son and dtr in law voices clinical changes with pt. SS will notify Dr. Levine. SS to follow.      14:23: SS spoke to RN and Dr. Levine has been notified of clinical concerns. Dr. Levine plans to visit/assess pt today per RN. SS received a call from Elina at [facility] and she plans to discuss daily copay waiver with corporate. Elina states daily copay will not be due before admission and pt will have the option for a payment plan for the daily copay amount if needed. Elina plans to contact Leno bird back  after information about copay waiver from [employer] is received. Elina has already screened pt for long-term care Medicaid and states that the process for long-term care Medicaid will be started on day one of admission. Elina states that if pt is approved for long-term care Medicaid then it will pay for daily copay. SS to follow.     17:57: SS received call from sonLeno and dtr in law, Chantel. Family recommends meeting be arranged with pt, pt's three son's, [facility] staff member, and [facility] to discuss discharge plan with pt due to her having decisional making capacity. SS contacted [facility] per Elina and she is agreeable to attend meeting in pt's room on Monday at 9:00. SS notified sonLeno. SS to follow.              Continued Care and Services - Admitted Since 10/5/2022     Destination     Service Provider Request Status Selected Services Address Phone Fax Patient Preferred    [facility] Accepted N/A [address] [place] [address] [phone] --    [facility] Pending - Request Sent N/A [address] [place] [address] [phone] --    [facility] Declined  Does not have a secondary payor source N/A [address] [facility] [place] [address] [phone] --            Selected Continued Care - Prior Encounters Includes continued care and service providers with selected services from prior encounters from 7/7/2022 to 11/18/2022    Discharged on 10/5/2022 Admission date: 9/19/2022 - Discharge disposition: Swing Bed    Destination     Service Provider Selected Services Address Phone Fax Patient Preferred    [facility] SWING BED Skilled Nursing [address] [facility] [place] [address] [phone] -- --                    LENORE Koch

## 2022-11-18 NOTE — THERAPY TREATMENT NOTE
Acute Care - Occupational Therapy Treatment Note   [place]     Patient Name: Tre Kinney  : 1932  MRN: [id number]  Today's Date: 2022  Onset of Illness/Injury or Date of Surgery: 10/06/22     Referring Physician: Janneth    Admit Date: 10/5/2022     No diagnosis found.  Patient Active Problem List   Diagnosis   • Acid reflux   • Ankle arthralgia   • Cardiac conduction disorder   • Closed fracture of distal fibula   • Arteriosclerosis of coronary artery   • Fracture of distal end of tibia   • Elevated cholesterol   • BP (high blood pressure)   • Hypertrophic polyarthritis   • OP (osteoporosis)   • Right heart failure (HCC)   • Heart failure, chronic, right-sided (HCC)   • SOB (shortness of breath)   • Palpitations   • CHF (congestive heart failure) (HCC)   • Congestive heart failure (HCC)   • Precordial pain   • Chronic anticoagulation   • Hypokalemia   • Anemia, unspecified type   • Generalized weakness     Past Medical History:   Diagnosis Date   • Anemia    • Arthritis    • CHF (congestive heart failure) ([facility])    • Chronic kidney disease    • Coronary artery disease    • H/O blood clots    • History of transfusion     no reaction    • Hypertension      Past Surgical History:   Procedure Laterality Date   • ABDOMINAL SURGERY     • APPENDECTOMY     • CARDIAC SURGERY     •  SECTION     • COLONOSCOPY N/A 3/7/2018    Procedure: COLONOSCOPY;  Surgeon: Willie Gresham MD;  Location:  [facility];  Service:    • CORONARY ANGIOPLASTY WITH STENT PLACEMENT     • ENDOSCOPY N/A 3/6/2018    Procedure: ESOPHAGOGASTRODUODENOSCOPY;  Surgeon: Willie Gresham MD;  Location:  [facility];  Service:    • PACEMAKER IMPLANTATION      left side    • SKIN BIOPSY     • TUBAL ABDOMINAL LIGATION           OT ASSESSMENT FLOWSHEET (last 12 hours)     OT Evaluation and Treatment     Row Name 22 1424                   OT Time and Intention    Subjective Information complains of;weakness;fatigue;pain  -LM         Document Type therapy note (daily note)  -LM        Mode of Treatment occupational therapy  -LM        Patient Effort good  -LM        Comment Patient seen with PT from 13:00 to 13:45 for assist with fxl mobility and standing tolerance, adl retraining, light BUE arom/therex.  patient attempted x 2 for sit to stand and unable to achieve full standing with max assist x 2.  Patient performed light bue arom therex sitting on eob.  Frequent rest breaks.  BUE tband and light arom against gravity.  sonWillian on facetime for first half of tx session.  Alternated with PT on therex.  Patient did require total assist toileting hygiene from [facility] [place] incontinence at beginning of session.  -LM           General Information    Existing Precautions/Restrictions fall  -LM           Cognition    Affect/Mental Status (Cognition) WFL  -LM        Orientation Status (Cognition) oriented x 4  -LM           Positioning and Restraints    Post Treatment Position bed  -LM        In Bed call light within reach;encouraged to call for assist;exit alarm on  -LM              User Key  (r) = Recorded By, (t) = Taken By, (c) = Cosigned By    Initials Name Effective Dates    LM Татьяна Bah, OT 06/16/21 -                        OT Recommendation and Plan           Outcome Measures     Row Name 11/18/22 1400 11/17/22 1433 11/16/22 1110       How much help from another is currently needed...    Putting on and taking off regular lower body clothing? 2  -LM 2  -LM 2  -LM    Bathing (including washing, rinsing, and drying) 2  -LM 2  -LM 2  -LM    Toileting (which includes using toilet bed pan or urinal) 1  -LM 2  -LM 2  -LM    Putting on and taking off regular upper body clothing 3  -LM 3  -LM 3  -LM    Taking care of personal grooming (such as brushing teeth) 3  -LM 3  -LM 3  -LM    Eating meals 4  -LM 4  -LM 4  -LM    AM-PAC 6 Clicks Score (OT) 15  -LM 16  -LM 16  -LM       Functional Assessment    Outcome Measure Options AM-PAC 6 Clicks Daily  Activity (OT)  -LM -- --          User Key  (r) = Recorded By, (t) = Taken By, (c) = Cosigned By    Initials Name Provider Type    Татьяна Mccabe OT Occupational Therapist                Time Calculation:    Time Calculation- OT     Row Name 11/18/22 1430             Time Calculation- OT    Total Timed Code Minutes- OT 15 minute(s)  -LM            User Key  (r) = Recorded By, (t) = Taken By, (c) = Cosigned By    Initials Name Provider Type    Татьяна Mccabe, OT Occupational Therapist              Therapy Charges for Today     Code Description Service Date Service Provider Modifiers Qty    31691295334 HC OT THER PROC EA 15 MIN 11/17/2022 Татьяна Bah OT GO 1    77487530425 HC OT THERAPEUTIC ACT EA 15 MIN 11/17/2022 Татьяна Bah OT GO 1    66243386806 HC OT THER PROC EA 15 MIN 11/18/2022 Татьяна Bah OT GO 1               Татьяна Bah OT  11/18/2022

## 2022-11-18 NOTE — THERAPY TREATMENT NOTE
Acute Care - Physical Therapy Treatment Note   [place]     Patient Name: Tre Kinney  : 1932  MRN: [id number]  Today's Date: 2022   Onset of Illness/Injury or Date of Surgery: 10/06/22  Visit Dx:   No diagnosis found.  Patient Active Problem List   Diagnosis   • Acid reflux   • Ankle arthralgia   • Cardiac conduction disorder   • Closed fracture of distal fibula   • Arteriosclerosis of coronary artery   • Fracture of distal end of tibia   • Elevated cholesterol   • BP (high blood pressure)   • Hypertrophic polyarthritis   • OP (osteoporosis)   • Right heart failure (HCC)   • Heart failure, chronic, right-sided (HCC)   • SOB (shortness of breath)   • Palpitations   • CHF (congestive heart failure) (HCC)   • Congestive heart failure (HCC)   • Precordial pain   • Chronic anticoagulation   • Hypokalemia   • Anemia, unspecified type   • Generalized weakness     Past Medical History:   Diagnosis Date   • Anemia    • Arthritis    • CHF (congestive heart failure) (HCC)    • Chronic kidney disease    • Coronary artery disease    • H/O blood clots    • History of transfusion     no reaction    • Hypertension      Past Surgical History:   Procedure Laterality Date   • ABDOMINAL SURGERY     • APPENDECTOMY     • CARDIAC SURGERY     •  SECTION     • COLONOSCOPY N/A 3/7/2018    Procedure: COLONOSCOPY;  Surgeon: Willie Gresham MD;  Location:  [facility];  Service:    • CORONARY ANGIOPLASTY WITH STENT PLACEMENT     • ENDOSCOPY N/A 3/6/2018    Procedure: ESOPHAGOGASTRODUODENOSCOPY;  Surgeon: Willie Gresham MD;  Location:  [facility];  Service:    • PACEMAKER IMPLANTATION      left side    • SKIN BIOPSY     • TUBAL ABDOMINAL LIGATION       PT Assessment (last 12 hours)     PT Evaluation and Treatment     Row Name 22 1301          Physical Therapy Time and Intention    Subjective Information complains of;weakness;fatigue;pain  -CT     Document Type therapy note (daily note)  -CT     Mode of  Treatment physical therapy  -CT     Patient Effort good  -CT     Comment Pt seen this date 3624-3583. Pelon Dorsey via [employer] [employer]. OT present to assist with transfers. Pt initially decline pain in L hip while supine but did report pain when sitting and attempting to stand. Pt attempted sit to stand x 2 with Max A x 2 but was uanble to get to full upright standing. Pt unable to take steps. Pt sat EOB and performed seated ther ex. Pt supine in bed at end of treatment.  -CT     Row Name 11/18/22 1301          General Information    Patient Profile Reviewed yes  -CT     Existing Precautions/Restrictions fall;other (see comments);TLSO  swan; pt declines use of TLSO  -CT     Limitations/Impairments safety/cognitive  -CT     Row Name 11/18/22 1301          Cognition    Affect/Mental Status (Cognition) WFL  -CT     Orientation Status (Cognition) oriented x 4  -CT     Follows Commands (Cognition) WFL  -CT     Row Name 11/18/22 1301          Bed Mobility    Supine-Sit [place] (Bed Mobility) minimum assist (75% patient effort)  -CT     Sit-Supine [place] (Bed Mobility) minimum assist (75% patient effort)  -CT     Bed Mobility, Safety Issues decreased use of legs for bridging/pushing;decreased use of arms for pushing/pulling;impaired trunk control for bed mobility  -CT     Assistive Device (Bed Mobility) bed rails;draw sheet  -CT     Row Name 11/18/22 1301          Transfers    Transfers sit-stand transfer;stand-sit transfer  -CT     Row Name 11/18/22 1301          Bed-Chair Transfer    Comment, (Bed-Chair Transfer) unable to functionally transfer to chair  -CT     Row Name 11/18/22 1301          Sit-Stand Transfer    Sit-Stand [place] (Transfers) maximum assist (25% patient effort);dependent (less than 25% patient effort);2 person assist;verbal cues;nonverbal cues (demo/gesture)  -CT     Assistive Device (Sit-Stand Transfers) walker, front-wheeled  -CT     Row Name 11/18/22 1301          Stand-Sit Transfer     Stand-Sit [place] (Transfers) maximum assist (25% patient effort);dependent (less than 25% patient effort);2 person assist;verbal cues;nonverbal cues (demo/gesture)  -CT     Assistive Device (Stand-Sit Transfers) walker, front-wheeled  -CT     Row Name 11/18/22 1301          Motor Skills    Therapeutic Exercise --  BLE sitting ther ex: LAQ, AP, seated marches, glut sets, hip abd/add 20 reps  -CT     Row Name 11/18/22 1301          Coping    Observed Emotional State calm;cooperative  -CT     Verbalized Emotional State acceptance  -CT     Family/Support Persons family  via facetime  -CT     Row Name 11/18/22 1301          Plan of Care Review    Plan of Care Reviewed With patient  -CT     Row Name 11/18/22 1301          Positioning and Restraints    Pre-Treatment Position in bed  -CT     Post Treatment Position bed  -CT     In Bed side lying left;call light within reach;encouraged to call for assist;exit alarm on;side rails up x3  -CT           User Key  (r) = Recorded By, (t) = Taken By, (c) = Cosigned By    Initials Name Provider Type    CT Bhragavi Vásquez, PT Physical Therapist                Physical Therapy Education     Title: PT OT SLP Therapies (Done)     Topic: Physical Therapy (Done)     Point: Mobility training (Done)     Learning Progress Summary           Patient Acceptance, E,TB, VU by RM at 11/18/2022 1021    Acceptance, E,TB, VU by RH at 11/16/2022 1110    Acceptance, E,TB, VU by KK at 11/14/2022 1638    Acceptance, E,TB, VU by KK at 11/13/2022 1648    Acceptance, E,TB, VU by KG at 11/10/2022 1827    Acceptance, E,TB, VU by DM at 11/9/2022 1638    Acceptance, E,TB, VU by KK at 11/8/2022 1754    Acceptance, E,TB, VU by KK at 11/7/2022 1635    Acceptance, E,D, VU,NR by LL at 11/5/2022 1325    Acceptance, E, VU by EB at 11/4/2022 1448    Acceptance, E,TB, VU by KM at 11/4/2022 1101                   Point: Home exercise program (Done)     Learning Progress Summary           Patient Acceptance, E,TB,  VU by RM at 11/18/2022 1021    Acceptance, E,TB, VU by RH at 11/16/2022 1110    Acceptance, E,TB, VU by KK at 11/14/2022 1638    Acceptance, E,TB, VU by KK at 11/13/2022 1648    Acceptance, E,TB, VU by KG at 11/10/2022 1827    Acceptance, E,TB, VU by DM at 11/9/2022 1638    Acceptance, E,TB, VU by KK at 11/8/2022 1754    Acceptance, E,TB, VU by KK at 11/7/2022 1635    Acceptance, E,D, VU,NR by LL at 11/5/2022 1325    Acceptance, E, VU by EB at 11/4/2022 1448    Acceptance, E,TB, VU by KM at 11/4/2022 1101                   Point: Body mechanics (Done)     Learning Progress Summary           Patient Acceptance, E,TB, VU by RM at 11/18/2022 1021    Acceptance, E,TB, VU by RH at 11/16/2022 1110    Acceptance, E,TB, VU by KK at 11/14/2022 1638    Acceptance, E,TB, VU by KK at 11/13/2022 1648    Acceptance, E,TB, VU by KG at 11/10/2022 1827    Acceptance, E,TB, VU by DM at 11/9/2022 1638    Acceptance, E,TB, VU by KK at 11/8/2022 1754    Acceptance, E,TB, VU by KK at 11/7/2022 1635    Acceptance, E,D, VU,NR by LL at 11/5/2022 1325    Acceptance, E, VU by EB at 11/4/2022 1448    Acceptance, E,TB, VU by KM at 11/4/2022 1101                   Point: Precautions (Done)     Learning Progress Summary           Patient Acceptance, E,TB, VU by RM at 11/18/2022 1021    Acceptance, E,TB, VU by RH at 11/16/2022 1110    Acceptance, E,TB, VU by KK at 11/14/2022 1638    Acceptance, E,TB, VU by KK at 11/13/2022 1648    Acceptance, E,TB, VU by KG at 11/10/2022 1827    Acceptance, E,TB, VU by DM at 11/9/2022 1638    Acceptance, E,TB, VU by KK at 11/8/2022 1754    Acceptance, E,TB, VU by KK at 11/7/2022 1635    Acceptance, E,D, VU,NR by LL at 11/5/2022 1325    Acceptance, E, VU by EB at 11/4/2022 1448    Acceptance, E,TB, VU by KM at 11/4/2022 1101                               User Key     Initials Effective Dates Name Provider Type Discipline     05/02/16 -  Neda Garcia PTA Physical Therapist Assistant PT    RM 06/14/22 -   Bethany Krause RN Registered Nurse Nurse    DM 06/16/21 -  Moniz, Deborah, RN Registered Nurse Nurse    KM 05/24/22 -  Mao Vázquez, PT Physical Therapist PT    EB 09/22/22 -  Cedric Oliver, RN Registered Nurse Nurse    KK 06/27/22 -  Chelsi Bolivar, RN Registered Nurse Nurse    KG 09/22/22 -  Verónica Rodriguez, RN Registered Nurse Nurse    RH 08/03/22 -  Jung Russell RN [facility] Registered Nurse Nurse              PT Recommendation and Plan  Anticipated Discharge Disposition (PT): home with 24/7 care, skilled nursing facility  Planned Therapy Interventions (PT): balance training, bed mobility training, gait training, home exercise program, manual therapy techniques, motor coordination training, neuromuscular re-education, patient/family education, postural re-education, strengthening, transfer training  Therapy Frequency (PT): 5 times/wk (5-6 times/wk)  Progress Summary (PT)  Progress Toward Functional Goals (PT): progress toward functional goals is gradual  Plan of Care Reviewed With: patient  Progress: no change   Outcome Measures     Row Name 11/18/22 1400 11/17/22 1433 11/16/22 1110       How much help from another is currently needed...    Putting on and taking off regular lower body clothing? 2  -LM 2  -LM 2  -LM    Bathing (including washing, rinsing, and drying) 2  -LM 2  -LM 2  -LM    Toileting (which includes using toilet bed pan or urinal) 1  -LM 2  -LM 2  -LM    Putting on and taking off regular upper body clothing 3  -LM 3  -LM 3  -LM    Taking care of personal grooming (such as brushing teeth) 3  -LM 3  -LM 3  -LM    Eating meals 4  -LM 4  -LM 4  -LM    AM-PAC 6 Clicks Score (OT) 15  -LM 16  -LM 16  -LM       Functional Assessment    Outcome Measure Options AM-PAC 6 Clicks Daily Activity (OT)  -LM -- --          User Key  (r) = Recorded By, (t) = Taken By, (c) = Cosigned By    Initials Name Provider Type    Татьяна Mccabe, OT Occupational Therapist                 Time Calculation:    PT Charges      Row Name 11/18/22 1511             Time Calculation    Start Time 1300  -CT      Stop Time 1345  -CT      Time Calculation (min) 45 min  -CT      PT Received On 11/18/22  -CT            User Key  (r) = Recorded By, (t) = Taken By, (c) = Cosigned By    Initials Name Provider Type    CT Bhargavi Vásquez, PT Physical Therapist              Therapy Charges for Today     Code Description Service Date Service Provider Modifiers Qty    47123856287 HC GAIT TRAINING EA 15 MIN 11/17/2022 Bhargavi Vásquez, PT GP 1    12997711480 HC PT THER PROC EA 15 MIN 11/17/2022 Bhargavi Vásquez, PT GP 1    17863897303 HC PT THERAPEUTIC ACT EA 15 MIN 11/17/2022 Bhargavi Vásquez, PT GP 1    23757763464 HC PT THER PROC EA 15 MIN 11/18/2022 Bhargavi Vásquez, PT GP 1    11842328336 HC PT THERAPEUTIC ACT EA 15 MIN 11/18/2022 Bhargavi Vásquez, PT GP 1          PT G-Codes  Outcome Measure Options: AM-PAC 6 Clicks Daily Activity (OT)  AM-PAC 6 Clicks Score (PT): 10  AM-PAC 6 Clicks Score (OT): 15    Bhargavi Vásquez PT  11/18/2022

## 2022-11-18 NOTE — PROGRESS NOTES
"    [facility] HOSPITALIST PROGRESS NOTE     Patient Identification:  Name:  Tre Kinney  Age:  89 y.o.  Sex:  female  :  1932  MRN:  [id number]  Visit Number:  [id number]  ROOM: [address] [place] [address]     Primary Care Provider:  Jessica Pedro APRN    Length of stay in inpatient status:  44    Subjective     Chief Compliant:  No chief complaint on file.      History of Presenting Illness:    Ms. Kinney is our 88 yo F with hx CHF, chronic kidney disease, coronary artery disease s/p stenting, history of blood clots, chronic anticoagulation with Coumadin, SSS s/p pacemaker placement, chronic diastolic CHF, and iron deficiency anemia who initially presented here after a fall. Patient found to have traumatic hematoma of right hip. Patient found to be anemic requiring 3 units of pRBCs. INR was high on presentation and reversed. Patient had DIANA that stabilized. Patient with chronic vertebral and rib fractures that were evaluated by orthopedic surgery. Patient also had volume overolad requiring diuresis during admission. Patient now admitted to swing bed for debility. Apixiban has been restarted, patient tolerating with stable hemoglobin on  of 9.7. Chart reviewed.     Nursing staff reporting patient was having \"kidney burning\" and left hip pain. She did not work with PT/OT today. Patient noted she was having burning, she believed from her urinary tract. No redness on exam per nursing staff. She also reported left hip pain since working with PT yesterday.     ROS:  Otherwise 10 point ROS negative other than documented above in HPI.     Objective     Current Hospital Meds:apixaban, 5 mg, Oral, Q12H  cefTRIAXone, 1 g, Intravenous, Q24H  escitalopram, 10 mg, Oral, Daily  furosemide, 40 mg, Oral, BID  gabapentin, 300 mg, Oral, Nightly  hydrALAZINE, 5 mg, Oral, Q8H  melatonin, 10 mg, Oral, Nightly  metoprolol tartrate, 25 mg, Oral, Q12H  multivitamin, 1 tablet, Oral, Daily  pantoprazole, 40 mg, Oral, Q AM  sodium " chloride, 10 mL, Intravenous, Q12H  sodium chloride, 10 mL, Intravenous, Q12H  tamsulosin, 0.4 mg, Oral, Daily    Pharmacy Consult,         Current Antimicrobial Therapy:  Anti-Infectives (From admission, onward)    Ordered     Dose/Rate Route Frequency Start Stop    11/18/22 1858  cefTRIAXone (ROCEPHIN) 1 g in sodium chloride 0.9 % 100 mL IVPB-VTB        Ordering Provider: Dante Levine MD    1 g  200 mL/hr over 30 Minutes Intravenous Every 24 Hours 11/18/22 1945 11/23/22 1944        Current Diuretic Therapy:  Diuretics (From admission, onward)    Ordered     Dose/Rate Route Frequency Start Stop    10/05/22 1252  furosemide (LASIX) tablet 40 mg        Ordering Provider: Jannet Lagunas MD    40 mg Oral 2 Times Daily (Diuretics) 10/05/22 1800          ----------------------------------------------------------------------------------------------------------------------  Vital Signs:  Temp:  [98.9 °F (37.2 °C)-99 °F (37.2 °C)] 98.9 °F (37.2 °C)  Heart Rate:  [74-75] 75  Resp:  [18] 18  BP: (115-158)/(55-81) 115/55     on   ;   Device (Oxygen Therapy): room air  Body mass index is 26.53 kg/m².    Wt Readings from Last 3 Encounters:   11/18/22 76.8 kg (169 lb 6.4 oz)   10/05/22 76.6 kg (168 lb 14.4 oz)   09/08/22 76.7 kg (169 lb)     Intake & Output (last 3 days)       11/15 0701 11/16 0700 11/16 0701  11/17 0700 11/17 0701 11/18 0700 11/18 0701 11/19 0700    P.O.  720    I.V. (mL/kg) 0 (0) 0 (0) 0 (0) 0 (0)    Total Intake(mL/kg) 660 (8.8) 720 (9.5) 1080 (14.1) 720 (9.4)    Urine (mL/kg/hr) 1150 (0.6) 900 (0.5) 600 (0.3) 1200 (1.3)    Stool    0    Total Output 1150     Net -490 -180 +480 -480            Stool Unmeasured Occurrence    1 x        Diet Soft to Chew (NDD 3); Chopped Meat; Regular Consistency; Regular/House Diet  ----------------------------------------------------------------------------------------------------------------------  Physical exam:  Constitutional:   Well-developed and well-nourished.  No respiratory distress.     HENT:  Head:  Normocephalic and atraumatic.  Mouth:  Moist mucous membranes.    Eyes:  Conjunctivae and EOM are normal. No scleral icterus.    Neck:  Neck supple.  No JVD present.    Cardiovascular:  Normal rate, regular rhythm and normal heart sounds with no murmur.  Pulmonary/Chest:  No respiratory distress, no wheezes, no crackles, with normal breath sounds and good air movement.  Abdominal:  Soft.  Bowel sounds are normal.  No distension and no tenderness.   Musculoskeletal:  Left hip mild pain upon palpitation.   Neurological:  Alert and oriented to person, place, and time.  No cranial nerve deficit.  No tongue deviation.  No facial droop.  No slurred speech.   Skin:  Skin is warm and dry. No rash noted. No pallor.   Peripheral vascular:  Pulses in all 4 extremities with no clubbing, no cyanosis, no edema.  : Rodriguez in place draining very cloudy urine.   ----------------------------------------------------------------------------------------------------------------------  Tele:    ----------------------------------------------------------------------------------------------------------------------  Results from last 7 days   Lab Units 11/17/22  0208 11/13/22  0116   WBC 10*3/mm3 7.56 7.47   HEMOGLOBIN g/dL 9.7* 9.7*   HEMATOCRIT % 29.9* 30.3*   MCV fL 93.4 93.2   MCHC g/dL 32.4 32.0   PLATELETS 10*3/mm3 236 238         Results from last 7 days   Lab Units 11/17/22  0208 11/13/22  0116   SODIUM mmol/L 139 140   POTASSIUM mmol/L 3.8 3.5   CHLORIDE mmol/L 101 102   CO2 mmol/L 26.9 26.6   BUN mg/dL 25* 27*   CREATININE mg/dL 1.86* 1.85*   CALCIUM mg/dL 8.4* 8.7   GLUCOSE mg/dL 102* 99   Estimated Creatinine Clearance: 21.9 mL/min (A) (by C-G formula based on SCr of 1.86 mg/dL (H)).  No results found for: AMMONIA              No results found for: HGBA1C, POCGLU  Lab Results   Component Value Date    TSH 3.030 10/07/2022     No results found for:  PREGTESTUR, PREGSERUM, HCG, HCGQUANT  Pain Management Panel     Pain Management Panel Latest Ref Rng & Units 9/20/2022 7/24/2019    CREATININE UR mg/dL 110.9 48.9    AMPHETAMINES SCREEN, URINE Negative - -    BARBITURATES SCREEN Negative - -    BENZODIAZEPINE SCREEN, URINE Negative - -    COCAINE SCREEN, URINE Negative - -    METHADONE SCREEN, URINE Negative - -        Brief Urine Lab Results  (Last result in the past 365 days)      Color   Clarity   Blood   Leuk Est   Nitrite   Protein   CREAT   Urine HCG        11/18/22 0953 Yellow   Turbid   Large (3+)   Large (3+)   Negative   30 mg/dL (1+)               No results found for: BLOODCX  No results found for: URINECX  No results found for: WOUNDCX  No results found for: STOOLCX  No results found for: RESPCX  No results found for: AFBCX        I have personally looked at the labs and they are summarized above.  ----------------------------------------------------------------------------------------------------------------------  Detailed radiology reports for the last 24 hours:    Imaging Results (Last 24 Hours)     Procedure Component Value Units Date/Time    XR Hip With or Without Pelvis 2 - 3 View Left [[id number]] Collected: 11/18/22 1624     Updated: 11/18/22 1638    Narrative:      EXAM:    XR Left Hip With Pelvis When Performed, 2 or 3 Views     CLINICAL HISTORY:    hip pain     TECHNIQUE:    Two or three views of the left hip with pelvis when performed.     COMPARISON:    No relevant prior studies available.     FINDINGS:    BONES/JOINTS:  Unremarkable.  No acute fracture.  No dislocation.    SOFT TISSUES:  Unremarkable.       Impression:        No acute findings in the left hip.     This report was finalized on 11/18/2022 4:25 PM by Dr. Ion Del Rosario MD.           Assessment & Plan      #UTI  - Pyuria, with symptoms and cloudy urine consistent with UTI  - Will start ceftriaxone and follow culture     #Left hip pain   - Suspect muscular strain, will get plain  film to rule out fracture.     #HTN  - Continues to be better controlled with increased metoprolol.     Generalized weakness/debility  - Continues to work with PT/OT    Chronic/resolved issues:  Diffuse LLE DVT  DIANA on CKD IV  Acute blood loss anemia  Traumatic right gluteal hematoma  Mild hypokalemia   Paroxysmal Afib  Hx of SSS s/p pacemaker placement  Reported hx of DVT's   Anxiety/depression  Advanced age      Dispo: Approved for swing bed through 11/26.     VTE Prophylaxis:   Mechanical Order History:      Ordered        10/05/22 1252  Place Sequential Compression Device  Once            10/05/22 1252  Maintain Sequential Compression Device  Continuous                    Pharmalogical Order History:      Ordered     Dose Route Frequency Stop    10/27/22 1034  apixaban (ELIQUIS) tablet 5 mg         5 mg PO Every 12 Hours Scheduled --    10/21/22 1301  Enoxaparin Sodium (LOVENOX) syringe 70 mg  Status:  Discontinued         1 mg/kg SC Every 24 Hours 10/27/22 1034    10/17/22 1147  Enoxaparin Sodium (LOVENOX) syringe 80 mg  Status:  Discontinued         1 mg/kg SC Every 24 Hours 10/21/22 1301    10/12/22 1847  Enoxaparin Sodium (LOVENOX) syringe 70 mg  Status:  Discontinued         1 mg/kg SC Every 24 Hours 10/17/22 1147    10/12/22 1831  Pharmacy to Dose enoxaparin (LOVENOX)  Status:  Discontinued         -- XX Continuous PRN 10/13/22 1417    10/05/22 1252  heparin (porcine) 5000 UNIT/ML injection 5,000 Units  Status:  Discontinued         5,000 Units SC Every 12 Hours Scheduled 10/12/22 1846                  Dante Levine MD  [facility]  11/18/22  18:58 EST

## 2022-11-18 NOTE — PLAN OF CARE
Goal Outcome Evaluation:  Plan of Care Reviewed With: patient        Progress: improving     Pt has no complaints this shift, no acute changes. Pt not gotten up this shift d/t writer's nursing judgement of safety issues (pt very unstable, unable to support weight). Will continue to monitor and follow current POC.

## 2022-11-18 NOTE — NURSING NOTE
Pt was offered to ambulate out of the bed to the chair & the Pt stated she didn't want to get up. Will continue to offer.

## 2022-11-18 NOTE — NURSING NOTE
Patient was asked if she wanted to get out of bed & ambulate to the chair & she refused. This am by the [employer] Rodríguez and SONAM

## 2022-11-18 NOTE — PROGRESS NOTES
"Palliative Care Daily Progress Note     S: Medical record reviewed, followed up with RN/Dr. Levine regarding patient's condition. Upon entering the room, pt found lying in bed. Pt c/o dysuria and bladder spasms. Nurse currently at bedside collecting ua from f/c bsd at this time. Pt reports that she isn't feeling very good today, pt appears weaker than normal today. Pt denies any pain, n/v or soa.       O:   Palliative Performance Scale Score:     /81 (BP Location: Right arm, Patient Position: Lying)   Pulse 74   Temp 99 °F (37.2 °C) (Oral)   Resp 18   Ht 170.2 cm (67.01\")   Wt 76.8 kg (169 lb 6.4 oz)   SpO2 97%   BMI 26.53 kg/m²     Intake/Output Summary (Last 24 hours) at 11/18/2022 1120  Last data filed at 11/18/2022 1000  Gross per 24 hour   Intake 1200 ml   Output 600 ml   Net 600 ml       PE:    General Appearance:    Chronically ill appearing, alert, cooperative, NAD   HEENT:    NC/AT, without obvious abnormality, EOMI, anicteric    Neck:   supple, trachea midline, no JVD   Lungs:     CTAB without w/r/r    Heart:    RRR, normal S1 and S2, no M/R/G   Abdomen:     Soft, NT, ND, NABS    Extremities:   Moves all extremities, no edema   Pulses:   Pulses palpable and equal bilaterally   Skin:   Warm, dry   Neurologic:   A/Ox3, cooperative   Psych:   Calm, appropriate         Meds: Reviewed and changes noted    Labs:   Results from last 7 days   Lab Units 11/17/22  0208   WBC 10*3/mm3 7.56   HEMOGLOBIN g/dL 9.7*   HEMATOCRIT % 29.9*   PLATELETS 10*3/mm3 236     Results from last 7 days   Lab Units 11/17/22  0208   SODIUM mmol/L 139   POTASSIUM mmol/L 3.8   CHLORIDE mmol/L 101   CO2 mmol/L 26.9   BUN mg/dL 25*   CREATININE mg/dL 1.86*   GLUCOSE mg/dL 102*   CALCIUM mg/dL 8.4*     Results from last 7 days   Lab Units 11/17/22  0208   SODIUM mmol/L 139   POTASSIUM mmol/L 3.8   CHLORIDE mmol/L 101   CO2 mmol/L 26.9   BUN mg/dL 25*   CREATININE mg/dL 1.86*   CALCIUM mg/dL 8.4*   GLUCOSE mg/dL 102*     Imaging " Results (Last 72 Hours)     ** No results found for the last 72 hours. **            Diagnostics: Reviewed    A: bp 158/81 hr 74 rr 18 sat 97% ra - lying in bed, c/o dysuria , ua being collected. Pt appears weak, reports not feeling very good today.       P: Continued support for patient and family, pt's swing bed days have been extended according to Kelly with [facility].       We will continue to follow along. Please do not hesitate to contact us regarding further sx mgmt or GOC needs, including after hours or on weekends via our on call provider at [phone].     Sherlyn Pike, APRN    11/18/2022

## 2022-11-18 NOTE — NURSING NOTE
Pt unstable on her feet, writer feels it's unsafe to safely get patient up this shift, both for patient and staff.

## 2022-11-19 LAB — BACTERIA SPEC AEROBE CULT: ABNORMAL

## 2022-11-19 PROCEDURE — 97530 THERAPEUTIC ACTIVITIES: CPT

## 2022-11-19 PROCEDURE — 97110 THERAPEUTIC EXERCISES: CPT

## 2022-11-19 PROCEDURE — 25010000002 CEFTRIAXONE PER 250 MG: Performed by: INTERNAL MEDICINE

## 2022-11-19 RX ADMIN — APIXABAN 5 MG: 5 TABLET, FILM COATED ORAL at 08:31

## 2022-11-19 RX ADMIN — METOPROLOL TARTRATE 25 MG: 25 TABLET, FILM COATED ORAL at 08:31

## 2022-11-19 RX ADMIN — PANTOPRAZOLE SODIUM 40 MG: 40 TABLET, DELAYED RELEASE ORAL at 05:29

## 2022-11-19 RX ADMIN — FUROSEMIDE 40 MG: 40 TABLET ORAL at 16:05

## 2022-11-19 RX ADMIN — APIXABAN 5 MG: 5 TABLET, FILM COATED ORAL at 20:43

## 2022-11-19 RX ADMIN — FUROSEMIDE 40 MG: 40 TABLET ORAL at 08:31

## 2022-11-19 RX ADMIN — CEFTRIAXONE 1 G: 1 INJECTION, POWDER, FOR SOLUTION INTRAMUSCULAR; INTRAVENOUS at 23:33

## 2022-11-19 RX ADMIN — Medication 10 ML: at 20:44

## 2022-11-19 RX ADMIN — HYDRALAZINE HYDROCHLORIDE 5 MG: 25 TABLET, FILM COATED ORAL at 20:43

## 2022-11-19 RX ADMIN — METOPROLOL TARTRATE 25 MG: 25 TABLET, FILM COATED ORAL at 20:43

## 2022-11-19 RX ADMIN — HYDRALAZINE HYDROCHLORIDE 5 MG: 25 TABLET, FILM COATED ORAL at 05:29

## 2022-11-19 RX ADMIN — HYDRALAZINE HYDROCHLORIDE 5 MG: 25 TABLET, FILM COATED ORAL at 14:31

## 2022-11-19 RX ADMIN — ESCITALOPRAM 10 MG: 10 TABLET, FILM COATED ORAL at 08:31

## 2022-11-19 RX ADMIN — Medication 1 TABLET: at 08:31

## 2022-11-19 RX ADMIN — TAMSULOSIN HYDROCHLORIDE 0.4 MG: 0.4 CAPSULE ORAL at 08:31

## 2022-11-19 RX ADMIN — Medication 10 MG: at 20:43

## 2022-11-19 RX ADMIN — GABAPENTIN 300 MG: 300 CAPSULE ORAL at 20:43

## 2022-11-19 NOTE — THERAPY TREATMENT NOTE
Acute Care - Occupational Therapy Treatment Note     [place]     Patient Name: Tre Kinney  : 1932  MRN: [id number]    Today's Date: 2022  Onset of Illness/Injury or Date of Surgery: 10/06/22       Referring Physician: Janneth      Admit Date: 10/5/2022       No diagnosis found.    Patient Active Problem List   Diagnosis   • Acid reflux   • Ankle arthralgia   • Cardiac conduction disorder   • Closed fracture of distal fibula   • Arteriosclerosis of coronary artery   • Fracture of distal end of tibia   • Elevated cholesterol   • BP (high blood pressure)   • Hypertrophic polyarthritis   • OP (osteoporosis)   • Right heart failure (HCC)   • Heart failure, chronic, right-sided (HCC)   • SOB (shortness of breath)   • Palpitations   • CHF (congestive heart failure) (HCC)   • Congestive heart failure (HCC)   • Precordial pain   • Chronic anticoagulation   • Hypokalemia   • Anemia, unspecified type   • Generalized weakness       Past Medical History:   Diagnosis Date   • Anemia    • Arthritis    • CHF (congestive heart failure) ([facility])    • Chronic kidney disease    • Coronary artery disease    • H/O blood clots    • History of transfusion     no reaction    • Hypertension        Past Surgical History:   Procedure Laterality Date   • ABDOMINAL SURGERY     • APPENDECTOMY     • CARDIAC SURGERY     •  SECTION     • COLONOSCOPY N/A 3/7/2018    Procedure: COLONOSCOPY;  Surgeon: Willie Gresham MD;  Location:  [facility];  Service:    • CORONARY ANGIOPLASTY WITH STENT PLACEMENT     • ENDOSCOPY N/A 3/6/2018    Procedure: ESOPHAGOGASTRODUODENOSCOPY;  Surgeon: Willie Gresham MD;  Location:  [facility];  Service:    • PACEMAKER IMPLANTATION      left side    • SKIN BIOPSY     • TUBAL ABDOMINAL LIGATION               IRF OT ASSESSMENT FLOWSHEET (last 12 hours)     IRF OT Evaluation and Treatment    No documentation.                          OT Recommendation and Plan    Anticipated Discharge  Disposition (OT): skilled nursing facility, home with 24/7 care, home with home health    Progress Toward Functional Goals (OT): progress toward functional goals is fair  Plan of Care Review  Plan of Care Reviewed With: patient  Progress: improving  Outcome Evaluation: Patient seen for OT treatment session (9:06 to 9:45). She declined calling son due to patient seen for self care tasks. She performed ADLs from recliner as documented. She is able to perform upper body tasks with set-up, but requires maximal assist with ADLs requiring standing. Continue plan of care.  Plan of Care Reviewed With: patient  Progress: improving       Outcome Measures     Row Name 11/19/22 1255 11/19/22 1200 11/19/22 1051       How much help from another person do you currently need...    Turning from your back to your side while in flat bed without using bedrails? -- -- 2  -KH    Moving from lying on back to sitting on the side of a flat bed without bedrails? -- -- 3  -KH    Moving to and from a bed to a chair (including a wheelchair)? -- -- 2  -KH    Standing up from a chair using your arms (e.g., wheelchair, bedside chair)? -- -- 2  -KH    Climbing 3-5 steps with a railing? -- -- 1  -KH    To walk in hospital room? -- -- 2  -KH    AM-PAC 6 Clicks Score (PT) -- -- 12  -KH       How much help from another is currently needed...    Putting on and taking off regular lower body clothing? 2  -KP 2  -KP --    Bathing (including washing, rinsing, and drying) 2  -KP 2  -KP --    Toileting (which includes using toilet bed pan or urinal) 2  -KP 2  -KP --    Putting on and taking off regular upper body clothing 3  -KP 3  -KP --    Taking care of personal grooming (such as brushing teeth) 3  -KP 3  -KP --    Eating meals 4  -KP 4  -KP --    AM-PAC 6 Clicks Score (OT) 16  -KP 16  -KP --       Functional Assessment    Outcome Measure Options -- -- AM-PAC 6 Clicks Basic Mobility (PT)  -KH    Row Name 11/18/22 1400 11/17/22 1433          How much help  from another is currently needed...    Putting on and taking off regular lower body clothing? 2  -LM 2  -LM     Bathing (including washing, rinsing, and drying) 2  -LM 2  -LM     Toileting (which includes using toilet bed pan or urinal) 1  -LM 2  -LM     Putting on and taking off regular upper body clothing 3  -LM 3  -LM     Taking care of personal grooming (such as brushing teeth) 3  -LM 3  -LM     Eating meals 4  -LM 4  -LM     AM-PAC 6 Clicks Score (OT) 15  -LM 16  -LM        Functional Assessment    Outcome Measure Options AM-PAC 6 Clicks Daily Activity (OT)  -LM --           User Key  (r) = Recorded By, (t) = Taken By, (c) = Cosigned By    Initials Name Provider Type     Татьяна Bah, OT Occupational Therapist    Linn Richardson OT Occupational Therapist    Radha Yusuf, PT Physical Therapist                  Time Calculation:      Time Calculation- OT     Row Name 11/19/22 1251             Time Calculation-     OT Start Time 1055  -      OT Stop Time 1141  -      OT Time Calculation (min) 46 min  -            User Key  (r) = Recorded By, (t) = Taken By, (c) = Cosigned By    Initials Name Provider Type    Linn Richardson OT Occupational Therapist              Therapy Charges for Today     Code Description Service Date Service Provider Modifiers Qty    82484786061  OT THERAPEUTIC ACT EA 15 MIN 11/19/2022 Linn Quevedo OT GO 1    [id number] HC OT THER PROC EA 15 MIN 11/19/2022 Linn Quevedo OT GO 1                   Linn Quevedo OT  11/19/2022

## 2022-11-19 NOTE — THERAPY TREATMENT NOTE
Acute Care - Physical Therapy Treatment Note   [place]     Patient Name: Tre Kinney  : 1932  MRN: [id number]  Today's Date: 2022   Onset of Illness/Injury or Date of Surgery: 10/06/22  Visit Dx:   No diagnosis found.  Patient Active Problem List   Diagnosis   • Acid reflux   • Ankle arthralgia   • Cardiac conduction disorder   • Closed fracture of distal fibula   • Arteriosclerosis of coronary artery   • Fracture of distal end of tibia   • Elevated cholesterol   • BP (high blood pressure)   • Hypertrophic polyarthritis   • OP (osteoporosis)   • Right heart failure (HCC)   • Heart failure, chronic, right-sided (HCC)   • SOB (shortness of breath)   • Palpitations   • CHF (congestive heart failure) (HCC)   • Congestive heart failure (HCC)   • Precordial pain   • Chronic anticoagulation   • Hypokalemia   • Anemia, unspecified type   • Generalized weakness     Past Medical History:   Diagnosis Date   • Anemia    • Arthritis    • CHF (congestive heart failure) (HCC)    • Chronic kidney disease    • Coronary artery disease    • H/O blood clots    • History of transfusion     no reaction    • Hypertension      Past Surgical History:   Procedure Laterality Date   • ABDOMINAL SURGERY     • APPENDECTOMY     • CARDIAC SURGERY     •  SECTION     • COLONOSCOPY N/A 3/7/2018    Procedure: COLONOSCOPY;  Surgeon: Willie Gresham MD;  Location:  [facility];  Service:    • CORONARY ANGIOPLASTY WITH STENT PLACEMENT     • ENDOSCOPY N/A 3/6/2018    Procedure: ESOPHAGOGASTRODUODENOSCOPY;  Surgeon: Willie Gresham MD;  Location:  [facility];  Service:    • PACEMAKER IMPLANTATION      left side    • SKIN BIOPSY     • TUBAL ABDOMINAL LIGATION       PT Assessment (last 12 hours)     PT Evaluation and Treatment     Row Name 22 1051          Physical Therapy Time and Intention    Document Type therapy note (daily note)  -     Mode of Treatment physical therapy  -     Patient Effort good  -KH      Comment Pt seen this AM with adam Rogers and Leno in room upon arrival and both left soon after. Pt seen with assist from CNA, then OT arrived for assist with transfer and ambulation using RW. Pt assisted with transfer from EOB to chair. Pt was able to collectively walk approximately 5' with RW and modAx2.  -     Row Name 11/19/22 1051          Bed Mobility    Bed Mobility supine-sit;scooting/bridging  -     Scooting/Bridging [place] (Bed Mobility) minimum assist (75% patient effort)  scooting towards EOB  -     Supine-Sit [place] (Bed Mobility) modified independence;standby assist;minimum assist (75% patient effort)  -     Comment, (Bed Mobility) Pt given demonstration to use BL UE to push down against bed. Pt given approximately minAx1 to assist in scooting towards EOB.  -     Row Name 11/19/22 1051          Transfers    Transfers bed-chair transfer;sit-stand transfer;stand-sit transfer  -     Row Name 11/19/22 1051          Bed-Chair Transfer    Bed-Chair [place] (Transfers) moderate assist (50% patient effort);maximum assist (25% patient effort);2 person assist  -     Row Name 11/19/22 1051          Sit-Stand Transfer    Sit-Stand [place] (Transfers) moderate assist (50% patient effort);2 person assist  -     Assistive Device (Sit-Stand Transfers) walker, front-wheeled  chair arms  -     Row Name 11/19/22 1051          Stand-Sit Transfer    Stand-Sit [place] (Transfers) minimum assist (75% patient effort);moderate assist (50% patient effort);2 person assist  -     Assistive Device (Stand-Sit Transfers) walker, front-wheeled  -     Row Name 11/19/22 1051          Gait/Stairs (Locomotion)    Gait/Stairs Locomotion gait/ambulation assistive device  -     [place] Level (Gait) 2 person assist;moderate assist (50% patient effort);maximum assist (25% patient effort)  -     Assistive Device (Gait) walker, front-wheeled  -     Distance in Feet (Gait) Approximate  total 5' with RW  -KH     Deviations/Abnormal Patterns (Gait) stride length decreased;gait speed decreased;festinating/shuffling  -     Row Name 11/19/22 1051          Plan of Care Review    Outcome Evaluation Pt was able to take steps this date with RW and approximately modAx2. Pt appears in better spirits today than when this PT saw her last.  -     Row Name 11/19/22 1051          Positioning and Restraints    Pre-Treatment Position in bed  -KH     Post Treatment Position chair  -KH     In Chair sitting;with OT  -KH           User Key  (r) = Recorded By, (t) = Taken By, (c) = Cosigned By    Initials Name Provider Type    Radha Yusuf, PT Physical Therapist                Physical Therapy Education     Title: PT OT SLP Therapies (Done)     Topic: Physical Therapy (Done)     Point: Mobility training (Done)     Learning Progress Summary           Patient Acceptance, E,TB, VU by RM at 11/18/2022 1021    Acceptance, E,TB, VU by RH at 11/16/2022 1110    Acceptance, E,TB, VU by KK at 11/14/2022 1638    Acceptance, E,TB, VU by KK at 11/13/2022 1648    Acceptance, E,TB, VU by KG at 11/10/2022 1827    Acceptance, E,TB, VU by DM at 11/9/2022 1638    Acceptance, E,TB, VU by KK at 11/8/2022 1754    Acceptance, E,TB, VU by KK at 11/7/2022 1635    Acceptance, E,D, VU,NR by LL at 11/5/2022 1325    Acceptance, E, VU by EB at 11/4/2022 1448    Acceptance, E,TB, VU by KM at 11/4/2022 1101                   Point: Home exercise program (Done)     Learning Progress Summary           Patient Acceptance, E,TB, VU by RM at 11/18/2022 1021    Acceptance, E,TB, VU by RH at 11/16/2022 1110    Acceptance, E,TB, VU by KK at 11/14/2022 1638    Acceptance, E,TB, VU by KK at 11/13/2022 1648    Acceptance, E,TB, VU by KG at 11/10/2022 1827    Acceptance, E,TB, VU by DM at 11/9/2022 1638    Acceptance, E,TB, VU by KK at 11/8/2022 1754    Acceptance, E,TB, VU by KK at 11/7/2022 1635    Acceptance, E,D, VU,NR by  at 11/5/2022 1325     Acceptance, E, VU by EB at 11/4/2022 1448    Acceptance, E,TB, VU by KM at 11/4/2022 1101                   Point: Body mechanics (Done)     Learning Progress Summary           Patient Acceptance, E,TB, VU by RM at 11/18/2022 1021    Acceptance, E,TB, VU by RH at 11/16/2022 1110    Acceptance, E,TB, VU by KK at 11/14/2022 1638    Acceptance, E,TB, VU by KK at 11/13/2022 1648    Acceptance, E,TB, VU by KG at 11/10/2022 1827    Acceptance, E,TB, VU by DM at 11/9/2022 1638    Acceptance, E,TB, VU by KK at 11/8/2022 1754    Acceptance, E,TB, VU by KK at 11/7/2022 1635    Acceptance, E,D, VU,NR by LL at 11/5/2022 1325    Acceptance, E, VU by EB at 11/4/2022 1448    Acceptance, E,TB, VU by KM at 11/4/2022 1101                   Point: Precautions (Done)     Learning Progress Summary           Patient Acceptance, E,TB, VU by RM at 11/18/2022 1021    Acceptance, E,TB, VU by RH at 11/16/2022 1110    Acceptance, E,TB, VU by KK at 11/14/2022 1638    Acceptance, E,TB, VU by KK at 11/13/2022 1648    Acceptance, E,TB, VU by KG at 11/10/2022 1827    Acceptance, E,TB, VU by DM at 11/9/2022 1638    Acceptance, E,TB, VU by KK at 11/8/2022 1754    Acceptance, E,TB, VU by KK at 11/7/2022 1635    Acceptance, E,D, VU,NR by LL at 11/5/2022 1325    Acceptance, E, VU by EB at 11/4/2022 1448    Acceptance, E,TB, VU by KM at 11/4/2022 1101                               User Key     Initials Effective Dates Name Provider Type Discipline    LL 05/02/16 -  Neda Garcia PTA Physical Therapist Assistant PT    RM 06/14/22 -  Bethany Krause RN Registered Nurse Nurse    DM 06/16/21 -  Moniz, Deborah, RN Registered Nurse Nurse    KM 05/24/22 -  Mao Vázquez, PT Physical Therapist PT    EB 09/22/22 -  Cedric Oliver, RN Registered Nurse Nurse    KK 06/27/22 -  Chelsi Bolivar, RN Registered Nurse Nurse    KG 09/22/22 -  Verónica Rodriguez, RN Registered Nurse Nurse     08/03/22 -  Jung Russell, RN [facility] Registered Nurse Nurse              PT  Recommendation and Plan     Outcome Evaluation: Pt was able to take steps this date with RW and approximately modAx2. Pt appears in better spirits today than when this PT saw her last.   Outcome Measures     Row Name 11/19/22 1051 11/18/22 1400 11/17/22 1433       How much help from another person do you currently need...    Turning from your back to your side while in flat bed without using bedrails? 2  -KH -- --    Moving from lying on back to sitting on the side of a flat bed without bedrails? 3  -KH -- --    Moving to and from a bed to a chair (including a wheelchair)? 2  -KH -- --    Standing up from a chair using your arms (e.g., wheelchair, bedside chair)? 2  -KH -- --    Climbing 3-5 steps with a railing? 1  -KH -- --    To walk in hospital room? 2  -KH -- --    AM-PAC 6 Clicks Score (PT) 12  -KH -- --       How much help from another is currently needed...    Putting on and taking off regular lower body clothing? -- 2  -LM 2  -LM    Bathing (including washing, rinsing, and drying) -- 2  -LM 2  -LM    Toileting (which includes using toilet bed pan or urinal) -- 1  -LM 2  -LM    Putting on and taking off regular upper body clothing -- 3  -LM 3  -LM    Taking care of personal grooming (such as brushing teeth) -- 3  -LM 3  -LM    Eating meals -- 4  -LM 4  -LM    AM-PAC 6 Clicks Score (OT) -- 15  -LM 16  -LM       Functional Assessment    Outcome Measure Options AM-PAC 6 Clicks Basic Mobility (PT)  -KH AM-PAC 6 Clicks Daily Activity (OT)  -LM --          User Key  (r) = Recorded By, (t) = Taken By, (c) = Cosigned By    Initials Name Provider Type    LM Татьяна Bah, OT Occupational Therapist    Radha Yusuf, EDEL Physical Therapist                 Time Calculation:    PT Charges     Row Name 11/19/22 1213 11/19/22 1212          Time Calculation    Start Time 1106  -KH 1051  -KH     Stop Time 1119  AM  -KH 1101  AM  -KH     Time Calculation (min) 13 min  -KH 10 min  -KH           User Key  (r) = Recorded  By, (t) = Taken By, (c) = Cosigned By    Initials Name Provider Type     Radha Vu, PT Physical Therapist              Therapy Charges for Today     Code Description Service Date Service Provider Modifiers Qty    09384763709 HC PT THERAPEUTIC ACT EA 15 MIN 11/19/2022 Radha Vu, EDEL GP 2          PT G-Codes  Outcome Measure Options: AM-PAC 6 Clicks Basic Mobility (PT)  AM-PAC 6 Clicks Score (PT): 12  AM-PAC 6 Clicks Score (OT): 15    Radha Vu PT  11/19/2022

## 2022-11-19 NOTE — PROGRESS NOTES
Ms. Kinney is our 89 yo F with hx CHF, chronic kidney disease, coronary artery disease s/p stenting, history of blood clots, chronic anticoagulation with Coumadin, SSS s/p pacemaker placement, chronic diastolic CHF, and iron deficiency anemia who initially presented here after a fall. Patient found to have traumatic hematoma of right hip. Patient found to be anemic requiring 3 units of pRBCs. INR was high on presentation and reversed. Patient had DIANA that stabilized. Patient with chronic vertebral and rib fractures that were evaluated by orthopedic surgery. Patient also had volume overolad requiring diuresis during admission. Patient now admitted to swing bed for debility. Apixiban has been restarted, patient tolerating with stable hemoglobin on 11/17 of 9.7. Chart reviewed including nursing notes. Patient worked with therapy today. Plain film of left hip unrevealing, suspect strained muscle that is improving. Patient found to have UTI and has been on ceftriaxone since 11/18. Patient growing gram negative yohan that is yet to speciate. Will follow.

## 2022-11-19 NOTE — PLAN OF CARE
Goal Outcome Evaluation:               Pt up in chair this shift. Pt worked with therapy, during therapy pt family was there at beginning then left before therapy was completed. No other complaints at this time.

## 2022-11-19 NOTE — THERAPY TREATMENT NOTE
Acute Care - Occupational Therapy Treatment Note   [place]     Patient Name: Tre Kinney  : 1932  MRN: [id number]  Today's Date: 2022  Onset of Illness/Injury or Date of Surgery: 10/06/22     Referring Physician: Janneth    Admit Date: 10/5/2022     No diagnosis found.  Patient Active Problem List   Diagnosis   • Acid reflux   • Ankle arthralgia   • Cardiac conduction disorder   • Closed fracture of distal fibula   • Arteriosclerosis of coronary artery   • Fracture of distal end of tibia   • Elevated cholesterol   • BP (high blood pressure)   • Hypertrophic polyarthritis   • OP (osteoporosis)   • Right heart failure (HCC)   • Heart failure, chronic, right-sided (HCC)   • SOB (shortness of breath)   • Palpitations   • CHF (congestive heart failure) (HCC)   • Congestive heart failure (HCC)   • Precordial pain   • Chronic anticoagulation   • Hypokalemia   • Anemia, unspecified type   • Generalized weakness     Past Medical History:   Diagnosis Date   • Anemia    • Arthritis    • CHF (congestive heart failure) ([facility])    • Chronic kidney disease    • Coronary artery disease    • H/O blood clots    • History of transfusion     no reaction    • Hypertension      Past Surgical History:   Procedure Laterality Date   • ABDOMINAL SURGERY     • APPENDECTOMY     • CARDIAC SURGERY     •  SECTION     • COLONOSCOPY N/A 3/7/2018    Procedure: COLONOSCOPY;  Surgeon: Willie Gresham MD;  Location:  [facility];  Service:    • CORONARY ANGIOPLASTY WITH STENT PLACEMENT     • ENDOSCOPY N/A 3/6/2018    Procedure: ESOPHAGOGASTRODUODENOSCOPY;  Surgeon: Willie Gresham MD;  Location:  [facility];  Service:    • PACEMAKER IMPLANTATION      left side    • SKIN BIOPSY     • TUBAL ABDOMINAL LIGATION           OT ASSESSMENT FLOWSHEET (last 12 hours)     OT Evaluation and Treatment     Row Name 22 1240                   OT Time and Intention    Subjective Information no complaints  -KP        Document Type  therapy note (daily note)  -        Mode of Treatment individual therapy;occupational therapy  -        Total Minutes, Occupational Therapy 46  1055 to 1141  -        Patient Effort good  -        Comment Patient working with PT upon arrival. She was seen for brief grooming tasks from sitting. Assisted PT with functional moblity tasks for safety and promote independence. Encouragement for overall progress and explanation of progress of therapy. She then performed seated UE exercises with yellow theraband. She remained up in chair with needs met and call light within reach.  -           General Information    Patient Profile Reviewed yes  -        General Observations of Patient Patient agreeable to therapy. Improved motivation and outlook, but curious about progression of therapy and plan. Improved tolerance for standing and attempts for functional mobility on this date.  -           Pain Assessment    Pretreatment Pain Rating 0/10 - no pain  -KP        Posttreatment Pain Rating 0/10 - no pain  -           Cognition    Affect/Mental Status (Cognition) Located within [facility]        Orientation Status (Cognition) oriented x 4  -        Follows Commands (Cognition) [facility]  -        Personal Safety Interventions fall prevention program maintained;gait belt;nonskid shoes/slippers when out of bed  -           Grooming Assessment/Training    [place] Level (Grooming) grooming skills;hair care, combing/brushing;wash face, hands;set up;standby assist  -        Position (Grooming) supported sitting  -           Bed-Chair Transfer    Bed-Chair [place] (Transfers) moderate assist (50% patient effort);2 person assist  -        Assistive Device (Bed-Chair Transfers) walker, front-wheeled  -        Comment, (Bed-Chair Transfer) maximal cues for technique, sequencing of steps due to fearful  -           Sit-Stand Transfer    Sit-Stand [place] (Transfers) moderate assist (50% patient effort);2 person  assist  -KP        Assistive Device (Sit-Stand Transfers) walker, front-wheeled  -KP           Stand-Sit Transfer    Stand-Sit [place] (Transfers) minimum assist (75% patient effort);moderate assist (50% patient effort);2 person assist  -KP        Assistive Device (Stand-Sit Transfers) walker, front-wheeled  -KP           Motor Skills    Functional Endurance fair  -KP        Therapeutic Exercise other (see comments)  BUE exercise while seated through functional movement paterns X20 reps X2 sets with yellow resistance band  -KP           Coping    Observed Emotional State calm;cooperative  -KP        Verbalized Emotional State other (see comments)  minimal anxiety while completing functional mobility, but improved coping  -KP           Plan of Care Review    Plan of Care Reviewed With patient  -KP        Progress improving  -KP           Positioning and Restraints    Pre-Treatment Position in bed  -KP        Post Treatment Position chair  -KP        In Chair call light within reach  -KP              User Key  (r) = Recorded By, (t) = Taken By, (c) = Cosigned By    Initials Name Effective Dates    Linn Richardson, VELASQUEZ 06/16/21 -                        OT Recommendation and Plan     Progress Toward Functional Goals (OT): progress toward functional goals is fair  Plan of Care Review  Plan of Care Reviewed With: patient  Progress: improving  Outcome Evaluation: Patient seen for OT treatment session (9:06 to 9:45). She declined calling son due to patient seen for self care tasks. She performed ADLs from recliner as documented. She is able to perform upper body tasks with set-up, but requires maximal assist with ADLs requiring standing. Continue plan of care.  Plan of Care Reviewed With: patient  Outcome Evaluation: Patient seen for OT treatment session (9:06 to 9:45). She declined calling son due to patient seen for self care tasks. She performed ADLs from recliner as documented. She is able to perform upper body  tasks with set-up, but requires maximal assist with ADLs requiring standing. Continue plan of care.     Outcome Measures     Row Name 11/19/22 1255 11/19/22 1200 11/19/22 1051       How much help from another person do you currently need...    Turning from your back to your side while in flat bed without using bedrails? -- -- 2  -KH    Moving from lying on back to sitting on the side of a flat bed without bedrails? -- -- 3  -KH    Moving to and from a bed to a chair (including a wheelchair)? -- -- 2  -KH    Standing up from a chair using your arms (e.g., wheelchair, bedside chair)? -- -- 2  -KH    Climbing 3-5 steps with a railing? -- -- 1  -KH    To walk in hospital room? -- -- 2  -KH    AM-PAC 6 Clicks Score (PT) -- -- 12  -KH       How much help from another is currently needed...    Putting on and taking off regular lower body clothing? 2  -KP 2  -KP --    Bathing (including washing, rinsing, and drying) 2  -KP 2  -KP --    Toileting (which includes using toilet bed pan or urinal) 2  -KP 2  -KP --    Putting on and taking off regular upper body clothing 3  -KP 3  -KP --    Taking care of personal grooming (such as brushing teeth) 3  -KP 3  -KP --    Eating meals 4  -KP 4  -KP --    AM-PAC 6 Clicks Score (OT) 16  -KP 16  -KP --       Functional Assessment    Outcome Measure Options -- -- AM-PAC 6 Clicks Basic Mobility (PT)  -KH    Row Name 11/18/22 1400 11/17/22 1433          How much help from another is currently needed...    Putting on and taking off regular lower body clothing? 2  -LM 2  -LM     Bathing (including washing, rinsing, and drying) 2  -LM 2  -LM     Toileting (which includes using toilet bed pan or urinal) 1  -LM 2  -LM     Putting on and taking off regular upper body clothing 3  -LM 3  -LM     Taking care of personal grooming (such as brushing teeth) 3  -LM 3  -LM     Eating meals 4  -LM 4  -LM     AM-PAC 6 Clicks Score (OT) 15  -LM 16  -LM        Functional Assessment    Outcome Measure Options  AM-PAC 6 Clicks Daily Activity (OT)  - --           User Key  (r) = Recorded By, (t) = Taken By, (c) = Cosigned By    Initials Name Provider Type     Татьяна Bah, OT Occupational Therapist    Linn Richardson, OT Occupational Therapist     Radha Vu, PT Physical Therapist                Time Calculation:    Time Calculation- OT     Row Name 11/19/22 1251             Time Calculation- OT    OT Start Time 1055  -      OT Stop Time 1141  -      OT Time Calculation (min) 46 min  -            User Key  (r) = Recorded By, (t) = Taken By, (c) = Cosigned By    Initials Name Provider Type    Linn Richardson OT Occupational Therapist              Therapy Charges for Today     Code Description Service Date Service Provider Modifiers Qty    19185122499 HC OT THERAPEUTIC ACT EA 15 MIN 11/19/2022 Linn Quevedo OT GO 1    80779539016 HC OT THER PROC EA 15 MIN 11/19/2022 Linn Quevedo OT GO 1               Linn Quevedo OT  11/19/2022

## 2022-11-19 NOTE — PLAN OF CARE
Goal Outcome Evaluation:  Plan of Care Reviewed With: patient        Progress: no change     Pt very tired this shift, rested in bed. 2 sets of blood cultures taken, Rocephin started. Will continue to monitor.

## 2022-11-20 LAB
ANION GAP SERPL CALCULATED.3IONS-SCNC: 15.6 MMOL/L (ref 5–15)
BASOPHILS # BLD AUTO: 0.06 10*3/MM3 (ref 0–0.2)
BASOPHILS NFR BLD AUTO: 0.8 % (ref 0–1.5)
BUN SERPL-MCNC: 29 MG/DL (ref 8–23)
BUN/CREAT SERPL: 14.9 (ref 7–25)
CALCIUM SPEC-SCNC: 8.7 MG/DL (ref 8.6–10.5)
CHLORIDE SERPL-SCNC: 102 MMOL/L (ref 98–107)
CO2 SERPL-SCNC: 18.4 MMOL/L (ref 22–29)
CREAT SERPL-MCNC: 1.94 MG/DL (ref 0.57–1)
DEPRECATED RDW RBC AUTO: 50.7 FL (ref 37–54)
EGFRCR SERPLBLD CKD-EPI 2021: 24.4 ML/MIN/1.73
EOSINOPHIL # BLD AUTO: 0.53 10*3/MM3 (ref 0–0.4)
EOSINOPHIL NFR BLD AUTO: 7.1 % (ref 0.3–6.2)
ERYTHROCYTE [DISTWIDTH] IN BLOOD BY AUTOMATED COUNT: 14.6 % (ref 12.3–15.4)
GLUCOSE SERPL-MCNC: 120 MG/DL (ref 65–99)
HCT VFR BLD AUTO: 33 % (ref 34–46.6)
HGB BLD-MCNC: 10.7 G/DL (ref 12–15.9)
IMM GRANULOCYTES # BLD AUTO: 0.03 10*3/MM3 (ref 0–0.05)
IMM GRANULOCYTES NFR BLD AUTO: 0.4 % (ref 0–0.5)
LYMPHOCYTES # BLD AUTO: 1.52 10*3/MM3 (ref 0.7–3.1)
LYMPHOCYTES NFR BLD AUTO: 20.4 % (ref 19.6–45.3)
MCH RBC QN AUTO: 30.6 PG (ref 26.6–33)
MCHC RBC AUTO-ENTMCNC: 32.4 G/DL (ref 31.5–35.7)
MCV RBC AUTO: 94.3 FL (ref 79–97)
MONOCYTES # BLD AUTO: 0.58 10*3/MM3 (ref 0.1–0.9)
MONOCYTES NFR BLD AUTO: 7.8 % (ref 5–12)
NEUTROPHILS NFR BLD AUTO: 4.72 10*3/MM3 (ref 1.7–7)
NEUTROPHILS NFR BLD AUTO: 63.5 % (ref 42.7–76)
NRBC BLD AUTO-RTO: 0 /100 WBC (ref 0–0.2)
PLATELET # BLD AUTO: 250 10*3/MM3 (ref 140–450)
PMV BLD AUTO: 9.9 FL (ref 6–12)
POTASSIUM SERPL-SCNC: 4.1 MMOL/L (ref 3.5–5.2)
RBC # BLD AUTO: 3.5 10*6/MM3 (ref 3.77–5.28)
SODIUM SERPL-SCNC: 136 MMOL/L (ref 136–145)
WBC NRBC COR # BLD: 7.44 10*3/MM3 (ref 3.4–10.8)

## 2022-11-20 PROCEDURE — 85025 COMPLETE CBC W/AUTO DIFF WBC: CPT | Performed by: INTERNAL MEDICINE

## 2022-11-20 PROCEDURE — 80048 BASIC METABOLIC PNL TOTAL CA: CPT | Performed by: INTERNAL MEDICINE

## 2022-11-20 RX ADMIN — APIXABAN 5 MG: 5 TABLET, FILM COATED ORAL at 21:00

## 2022-11-20 RX ADMIN — Medication 10 ML: at 21:01

## 2022-11-20 RX ADMIN — APIXABAN 5 MG: 5 TABLET, FILM COATED ORAL at 09:20

## 2022-11-20 RX ADMIN — HYDRALAZINE HYDROCHLORIDE 5 MG: 25 TABLET, FILM COATED ORAL at 15:38

## 2022-11-20 RX ADMIN — METOPROLOL TARTRATE 25 MG: 25 TABLET, FILM COATED ORAL at 21:00

## 2022-11-20 RX ADMIN — Medication 10 MG: at 21:00

## 2022-11-20 RX ADMIN — METOPROLOL TARTRATE 25 MG: 25 TABLET, FILM COATED ORAL at 09:20

## 2022-11-20 RX ADMIN — Medication 10 ML: at 21:06

## 2022-11-20 RX ADMIN — FUROSEMIDE 40 MG: 40 TABLET ORAL at 17:25

## 2022-11-20 RX ADMIN — GABAPENTIN 300 MG: 300 CAPSULE ORAL at 21:00

## 2022-11-20 RX ADMIN — Medication 10 ML: at 09:21

## 2022-11-20 RX ADMIN — HYDROCODONE BITARTRATE AND ACETAMINOPHEN 1 TABLET: 7.5; 325 TABLET ORAL at 06:01

## 2022-11-20 RX ADMIN — Medication 1 TABLET: at 09:20

## 2022-11-20 RX ADMIN — ESCITALOPRAM 10 MG: 10 TABLET, FILM COATED ORAL at 09:20

## 2022-11-20 RX ADMIN — PANTOPRAZOLE SODIUM 40 MG: 40 TABLET, DELAYED RELEASE ORAL at 05:58

## 2022-11-20 RX ADMIN — FUROSEMIDE 40 MG: 40 TABLET ORAL at 09:20

## 2022-11-20 RX ADMIN — HYDRALAZINE HYDROCHLORIDE 5 MG: 25 TABLET, FILM COATED ORAL at 22:02

## 2022-11-20 RX ADMIN — TAMSULOSIN HYDROCHLORIDE 0.4 MG: 0.4 CAPSULE ORAL at 09:20

## 2022-11-20 RX ADMIN — HYDRALAZINE HYDROCHLORIDE 5 MG: 25 TABLET, FILM COATED ORAL at 05:58

## 2022-11-20 NOTE — NURSING NOTE
Patient was asking by CNA this if she wanted to get up in the chair she said she didn't feel like it currently. RN asked pt if she did said she didn't feel like getting up right now. Will continue to offer.

## 2022-11-20 NOTE — PLAN OF CARE
Goal Outcome Evaluation:  Plan of Care Reviewed With: patient        Progress: no change     No acute changes this shift, patient has been sleeping. Will continue to monitor and follow current POC.

## 2022-11-20 NOTE — PROGRESS NOTES
Ms. Kinney is our 89 yo F with hx CHF, chronic kidney disease, coronary artery disease s/p stenting, history of blood clots, chronic anticoagulation with Coumadin, SSS s/p pacemaker placement, chronic diastolic CHF, and iron deficiency anemia who initially presented here after a fall. Patient found to have traumatic hematoma of right hip. Patient found to be anemic requiring 3 units of pRBCs. INR was high on presentation and reversed. Patient had DIANA that stabilized. Patient with chronic vertebral and rib fractures that were evaluated by orthopedic surgery. Patient also had volume overolad requiring diuresis during admission. Patient now admitted to swing bed for debility. Apixiban has been restarted, patient tolerating with stable hemoglobin on 11/20 of 10.7. Chart reviewed including nursing notes. Bethany, bedside nurse today, reported patient was stiff with neck and back pain this AM.  Patient worked with therapy yesterday. As the day progressed pain and stiffness improved and patient was in bedside chair on my evaluation. Exam and symptoms consistent with known degenerative changes seen in cervical spine on CT in 9/22. Patient found to have UTI and has been on ceftriaxone since 11/18. Today is day 3/3. Patient growing gram negative yohan that is yet to speciate. Will follow.

## 2022-11-20 NOTE — PLAN OF CARE
Goal Outcome Evaluation:  Plan of Care Reviewed With: patient        Progress: no change  Outcome Evaluation: Patient's VSS. Pt ambulated from bed to the chair & sat up in the chair throughout the shift. Pt c/o of a sore neck MD aware & MD evlauted pt. Pt voiced no further concerns at this time.

## 2022-11-20 NOTE — NURSING NOTE
Patient was able to ambulate from the bed to the chair & is currently sitting in the chair. Family is at bedside.

## 2022-11-21 PROCEDURE — 97110 THERAPEUTIC EXERCISES: CPT

## 2022-11-21 PROCEDURE — 99308 SBSQ NF CARE LOW MDM 20: CPT | Performed by: INTERNAL MEDICINE

## 2022-11-21 PROCEDURE — 97530 THERAPEUTIC ACTIVITIES: CPT

## 2022-11-21 PROCEDURE — 25010000002 CEFTRIAXONE PER 250 MG: Performed by: INTERNAL MEDICINE

## 2022-11-21 RX ADMIN — FUROSEMIDE 40 MG: 40 TABLET ORAL at 08:38

## 2022-11-21 RX ADMIN — Medication 10 ML: at 21:23

## 2022-11-21 RX ADMIN — METOPROLOL TARTRATE 25 MG: 25 TABLET, FILM COATED ORAL at 21:23

## 2022-11-21 RX ADMIN — PANTOPRAZOLE SODIUM 40 MG: 40 TABLET, DELAYED RELEASE ORAL at 05:58

## 2022-11-21 RX ADMIN — GABAPENTIN 300 MG: 300 CAPSULE ORAL at 21:22

## 2022-11-21 RX ADMIN — TAMSULOSIN HYDROCHLORIDE 0.4 MG: 0.4 CAPSULE ORAL at 08:39

## 2022-11-21 RX ADMIN — APIXABAN 5 MG: 5 TABLET, FILM COATED ORAL at 21:23

## 2022-11-21 RX ADMIN — ESCITALOPRAM 10 MG: 10 TABLET, FILM COATED ORAL at 08:38

## 2022-11-21 RX ADMIN — METOPROLOL TARTRATE 25 MG: 25 TABLET, FILM COATED ORAL at 08:39

## 2022-11-21 RX ADMIN — Medication 10 MG: at 21:23

## 2022-11-21 RX ADMIN — CEFTRIAXONE 1 G: 1 INJECTION, POWDER, FOR SOLUTION INTRAMUSCULAR; INTRAVENOUS at 00:40

## 2022-11-21 RX ADMIN — HYDRALAZINE HYDROCHLORIDE 5 MG: 25 TABLET, FILM COATED ORAL at 05:58

## 2022-11-21 RX ADMIN — Medication 10 ML: at 13:44

## 2022-11-21 RX ADMIN — APIXABAN 5 MG: 5 TABLET, FILM COATED ORAL at 08:39

## 2022-11-21 RX ADMIN — HYDRALAZINE HYDROCHLORIDE 5 MG: 25 TABLET, FILM COATED ORAL at 13:40

## 2022-11-21 RX ADMIN — Medication 10 ML: at 08:38

## 2022-11-21 RX ADMIN — Medication 1 TABLET: at 08:39

## 2022-11-21 RX ADMIN — FUROSEMIDE 40 MG: 40 TABLET ORAL at 17:26

## 2022-11-21 NOTE — CASE MANAGEMENT/SOCIAL WORK
Discharge Planning Assessment  [facility]     Patient Name: Tre Kinney  MRN: [id number]  Today's Date: 11/21/2022    Admit Date: 10/5/2022    Plan: SS meet in pt's room today with pt, [facility] per Elina, Leno bird, and coordinator, Maria Alejandra. Conor Bird was included via speaker phone. [facility] per Elina educated pt about skilled rehab nursing home admission and long-term care nursing home admission. Pt was informed by Elina that she would have to submit her monthly income minus $40 to the nursing home if she wants to stay long-term at a nursing home. Pt declines skilled rehab nursing home placement and long-term care nursing home placement. Pt prefers to remain in swing bed at [facility] until her skilled Medicare days are exhausted and then she plans to return home. Pt is currently approved by her insurance for swing bed admission through 11/28/22 and updated clinicals will be submitted to her insurance on 11/28/22 to request additional swing bed days. Pt and pt's family voices that pt will not have needed care at home. SS notified Dr. Loredo. SS to follow.     Discharge Plan     Row Name 11/21/22 1137       Plan    Plan SS meet in pt's room today with pt, [facility] per padmaja Antoine Barry, and coordinator, Maria Alejandra. Conor Bird was included via speaker phone. [facility] per Elina educated pt about skilled rehab nursing home admission and long-term care nursing home admission. Pt was informed by Elina that she would have to submit her monthly income minus $40 to the nursing home if she wants to stay long-term at a nursing home. Pt declines skilled rehab nursing home placement and long-term care nursing home placement. Pt prefers to remain in swing bed at [facility] until her skilled Medicare days are exhausted and then she plans to return home. Pt is currently approved by her insurance for swing bed admission through 11/28/22 and updated clinicals will be submitted to her insurance on 11/28/22 to request additional swing bed  days. Pt and pt's family voices that pt will not have needed care at home. SS notified Dr. Loredo. SS to follow.              Continued Care and Services - Admitted Since 10/5/2022     Destination     Service Provider Request Status Selected Services Address Phone Fax Patient Preferred    [facility] Accepted N/A [address] [facility] [address] [phone] --    [facility] Pending - Request Sent N/A [address] [facility] [id number] [phone] --    [facility] Declined  Does not have a secondary payor source N/A [address]   [place] [address] [phone] --            Selected Continued Care - Prior Encounters Includes continued care and service providers with selected services from prior encounters from 7/7/2022 to 11/21/2022    Discharged on 10/5/2022 Admission date: 9/19/2022 - Discharge disposition: Swing Bed    Destination     Service Provider Selected Services Address Phone Fax Patient Preferred    [facility] SWING BED Skilled Nursing [address] [facility] [place] [address] [phone] -- --                    LENORE Koch

## 2022-11-21 NOTE — PLAN OF CARE
Goal Outcome Evaluation:  Plan of Care Reviewed With: patient        Progress: no change  Outcome Evaluation: Pt resting in bed. Vital signs stable. Pt sat up in chair at the beginning of shift, tolerated well and ambulated back to bed. No acute changes. Will continue plan of care.

## 2022-11-21 NOTE — PLAN OF CARE
Goal Outcome Evaluation:  Plan of Care Reviewed With: patient        Progress: no change  Outcome Evaluation: Pt resting, worked with PT today, has sat in chair also, will continue to monitor

## 2022-11-21 NOTE — THERAPY TREATMENT NOTE
Acute Care - Physical Therapy Treatment Note   [facility]     Patient Name: Tre Kinney  : 1932  MRN: [id number]  Today's Date: 2022   Onset of Illness/Injury or Date of Surgery: 10/06/22  Visit Dx:   No diagnosis found.  Patient Active Problem List   Diagnosis   • Acid reflux   • Ankle arthralgia   • Cardiac conduction disorder   • Closed fracture of distal fibula   • Arteriosclerosis of coronary artery   • Fracture of distal end of tibia   • Elevated cholesterol   • BP (high blood pressure)   • Hypertrophic polyarthritis   • OP (osteoporosis)   • Right heart failure (HCC)   • Heart failure, chronic, right-sided (HCC)   • SOB (shortness of breath)   • Palpitations   • CHF (congestive heart failure) (HCC)   • Congestive heart failure (HCC)   • Precordial pain   • Chronic anticoagulation   • Hypokalemia   • Anemia, unspecified type   • Generalized weakness     Past Medical History:   Diagnosis Date   • Anemia    • Arthritis    • CHF (congestive heart failure) (HCC)    • Chronic kidney disease    • Coronary artery disease    • H/O blood clots    • History of transfusion     no reaction    • Hypertension      Past Surgical History:   Procedure Laterality Date   • ABDOMINAL SURGERY     • APPENDECTOMY     • CARDIAC SURGERY     •  SECTION     • COLONOSCOPY N/A 3/7/2018    Procedure: COLONOSCOPY;  Surgeon: Willie Gresham MD;  Location:  [facility];  Service:    • CORONARY ANGIOPLASTY WITH STENT PLACEMENT     • ENDOSCOPY N/A 3/6/2018    Procedure: ESOPHAGOGASTRODUODENOSCOPY;  Surgeon: Willie Gresham MD;  Location:  [facility];  Service:    • PACEMAKER IMPLANTATION      left side    • SKIN BIOPSY     • TUBAL ABDOMINAL LIGATION       PT Assessment (last 12 hours)     PT Evaluation and Treatment     Row Name 22 1544          Physical Therapy Time and Intention    Subjective Information complains of;weakness;fatigue  -CT     Document Type therapy note (daily note)  -CT     Mode of  Treatment physical therapy  -CT     Patient Effort adequate  -CT     Comment Pts treatment session [id number]. Pts padmaja Dorsey via Wedge Buster. Pt performed seated ther ex in chair and transfer back to bed Max A x 2. Pt unable to stand and ambulate this date.  -CT     Row Name 11/21/22 1544          Bed Mobility    Bed Mobility supine-sit;scooting/bridging  -CT     Sit-Supine [place] (Bed Mobility) minimum assist (75% patient effort)  -CT     Row Name 11/21/22 1544          Bed-Chair Transfer    Bed-Chair [place] (Transfers) maximum assist (25% patient effort);2 person assist;verbal cues;nonverbal cues (demo/gesture)  -CT     Assistive Device (Bed-Chair Transfers) walker, front-wheeled  -CT     Row Name 11/21/22 1544          Sit-Stand Transfer    Sit-Stand [place] (Transfers) maximum assist (25% patient effort);2 person assist;verbal cues;nonverbal cues (demo/gesture)  -CT     Assistive Device (Sit-Stand Transfers) walker, front-wheeled  -CT     Row Name 11/21/22 1544          Stand-Sit Transfer    Stand-Sit [place] (Transfers) maximum assist (25% patient effort);2 person assist;verbal cues;nonverbal cues (demo/gesture)  -CT     Assistive Device (Stand-Sit Transfers) walker, front-wheeled  -CT     Row Name 11/21/22 1545          Gait/Stairs (Locomotion)    Gait/Stairs Locomotion gait/ambulation assistive device  -CT     [place] Level (Gait) maximum assist (25% patient effort);dependent (less than 25% patient effort);2 person assist;verbal cues;nonverbal cues (demo/gesture)  -CT     Assistive Device (Gait) walker, front-wheeled  -CT     Distance in Feet (Gait) 2 small steps during transfer  -CT     Pattern (Gait) step-to  -CT     Deviations/Abnormal Patterns (Gait) stride length decreased;gait speed decreased;festinating/shuffling  -CT     Row Name 11/21/22 1541          Motor Skills    Therapeutic Exercise --  BLE seated ther ex: AP, LAQ, seated marches, hip abd/add, glut sets  -CT     Row Name  11/21/22 1544          Plan of Care Review    Plan of Care Reviewed With patient  -CT     Row Name 11/21/22 1544          Positioning and Restraints    Pre-Treatment Position sitting in chair/recliner  -CT     Post Treatment Position bed  -CT     In Bed supine;call light within reach;encouraged to call for assist;exit alarm on;side rails up x3  -CT           User Key  (r) = Recorded By, (t) = Taken By, (c) = Cosigned By    Initials Name Provider Type    CT Bhargavi Vásquez PT Physical Therapist                Physical Therapy Education     Title: PT OT SLP Therapies (Done)     Topic: Physical Therapy (Done)     Point: Mobility training (Done)     Learning Progress Summary           Patient Acceptance, E,TB, VU by RM at 11/20/2022 1449    Acceptance, E,TB, VU by RM at 11/18/2022 1021    Acceptance, E,TB, VU by RH at 11/16/2022 1110    Acceptance, E,TB, VU by KK at 11/14/2022 1638    Acceptance, E,TB, VU by KK at 11/13/2022 1648    Acceptance, E,TB, VU by KG at 11/10/2022 1827    Acceptance, E,TB, VU by DM at 11/9/2022 1638    Acceptance, E,TB, VU by KK at 11/8/2022 1754    Acceptance, E,TB, VU by KK at 11/7/2022 1635    Acceptance, E,D, VU,NR by LL at 11/5/2022 1325    Acceptance, E, VU by EB at 11/4/2022 1448    Acceptance, E,TB, VU by KM at 11/4/2022 1101                   Point: Home exercise program (Done)     Learning Progress Summary           Patient Acceptance, E,TB, VU by RM at 11/20/2022 1449    Acceptance, E,TB, VU by RM at 11/18/2022 1021    Acceptance, E,TB, VU by RH at 11/16/2022 1110    Acceptance, E,TB, VU by KK at 11/14/2022 1638    Acceptance, E,TB, VU by KK at 11/13/2022 1648    Acceptance, E,TB, VU by KG at 11/10/2022 1827    Acceptance, E,TB, VU by DM at 11/9/2022 1638    Acceptance, E,TB, VU by KK at 11/8/2022 1754    Acceptance, E,TB, VU by KK at 11/7/2022 1635    Acceptance, E,D, VU,NR by LL at 11/5/2022 1325    Acceptance, E, VU by EB at 11/4/2022 1448    Acceptance, E,TB, VU by KM at  11/4/2022 1101                   Point: Body mechanics (Done)     Learning Progress Summary           Patient Acceptance, E,TB, VU by RM at 11/20/2022 1449    Acceptance, E,TB, VU by RM at 11/18/2022 1021    Acceptance, E,TB, VU by RH at 11/16/2022 1110    Acceptance, E,TB, VU by KK at 11/14/2022 1638    Acceptance, E,TB, VU by KK at 11/13/2022 1648    Acceptance, E,TB, VU by KG at 11/10/2022 1827    Acceptance, E,TB, VU by DM at 11/9/2022 1638    Acceptance, E,TB, VU by KK at 11/8/2022 1754    Acceptance, E,TB, VU by KK at 11/7/2022 1635    Acceptance, E,D, VU,NR by LL at 11/5/2022 1325    Acceptance, E, VU by EB at 11/4/2022 1448    Acceptance, E,TB, VU by KM at 11/4/2022 1101                   Point: Precautions (Done)     Learning Progress Summary           Patient Acceptance, E,TB, VU by RM at 11/20/2022 1449    Acceptance, E,TB, VU by RM at 11/18/2022 1021    Acceptance, E,TB, VU by RH at 11/16/2022 1110    Acceptance, E,TB, VU by KK at 11/14/2022 1638    Acceptance, E,TB, VU by KK at 11/13/2022 1648    Acceptance, E,TB, VU by KG at 11/10/2022 1827    Acceptance, E,TB, VU by DM at 11/9/2022 1638    Acceptance, E,TB, VU by KK at 11/8/2022 1754    Acceptance, E,TB, VU by KK at 11/7/2022 1635    Acceptance, E,D, VU,NR by LL at 11/5/2022 1325    Acceptance, E, VU by EB at 11/4/2022 1448    Acceptance, E,TB, VU by KM at 11/4/2022 1101                               User Key     Initials Effective Dates Name Provider Type Discipline     05/02/16 -  Neda Garcia PTA Physical Therapist Assistant PT     06/14/22 -  Jimi, Bethany K, RN Registered Nurse Nurse    DM 06/16/21 -  Moniz, Deborah, RN Registered Nurse Nurse    KM 05/24/22 -  Mao Vázquez, PT Physical Therapist PT    EB 09/22/22 -  Cedric Oliver, RN Registered Nurse Nurse    KK 06/27/22 -  Cehlsi Bolivar, RN Registered Nurse Nurse    KG 09/22/22 -  Verónica Rodriguez, RN Registered Nurse Nurse    RH 08/03/22 -  Jung Russell, RN [facility] Registered Nurse  Nurse              PT Recommendation and Plan  Anticipated Discharge Disposition (PT): home with 24/7 [facility], [facility] nursing [facility]  Planned Therapy Interventions (PT): balance training, bed mobility training, gait training, home exercise program, manual therapy techniques, motor coordination training, neuromuscular re-education, patient/family education, postural re-education, strengthening, transfer training  Therapy Frequency (PT): 5 times/wk (5-6 times/wk)  Progress Summary (PT)  Progress Toward Functional Goals (PT): progress toward functional goals is gradual  Plan of Care Reviewed With: patient  Progress: no change   Outcome Measures     Row Name 11/19/22 1255 11/19/22 1200 11/19/22 1051       How much help from another person do you currently need...    Turning from your back to your side while in flat bed without using bedrails? -- -- 2  -KH    Moving from lying on back to sitting on the side of a flat bed without bedrails? -- -- 3  -KH    Moving to and from a bed to a chair (including a wheelchair)? -- -- 2  -KH    Standing up from a chair using your arms (e.g., wheelchair, bedside chair)? -- -- 2  -KH    Climbing 3-5 steps with a railing? -- -- 1  -KH    To walk in hospital room? -- -- 2  -KH    AM-PAC 6 Clicks Score (PT) -- -- 12  -KH       How much help from another is currently needed...    Putting on and taking off regular lower body clothing? 2  -KP 2  -KP --    Bathing (including washing, rinsing, and drying) 2  -KP 2  -KP --    Toileting (which includes using toilet bed pan or urinal) 2  -KP 2  -KP --    Putting on and taking off regular upper body clothing 3  -KP 3  -KP --    Taking care of personal grooming (such as brushing teeth) 3  -KP 3  -KP --    Eating meals 4  -KP 4  -KP --    AM-PAC 6 Clicks Score (OT) 16  -KP 16  -KP --       Functional Assessment    Outcome Measure Options -- -- AM-PAC 6 Clicks Basic Mobility (PT)  -KH          User Key  (r) = Recorded By, (t) = Taken By, (c) =  Cosigned By    Initials Name Provider Type    Linn Richardson, OT Occupational Therapist    Radha Yusuf, PT Physical Therapist                 Time Calculation:    PT Charges     Row Name 11/21/22 1547             Time Calculation    Start Time 1253  -CT      Stop Time 1340  -CT      Time Calculation (min) 47 min  -CT      PT Received On 11/21/22  -CT            User Key  (r) = Recorded By, (t) = Taken By, (c) = Cosigned By    Initials Name Provider Type    CT Bhargavi Vásquez, EDEL Physical Therapist              Therapy Charges for Today     Code Description Service Date Service Provider Modifiers Qty    99854128731 HC PT THER PROC EA 15 MIN 11/21/2022 Bhargavi Vásquez, PT GP 1    12023915340 HC PT THERAPEUTIC ACT EA 15 MIN 11/21/2022 Bhargavi Vásquez, PT GP 1          PT G-Codes  Outcome Measure Options: AM-PAC 6 Clicks Basic Mobility (PT)  AM-PAC 6 Clicks Score (PT): 12  AM-PAC 6 Clicks Score (OT): 16    Bhargavi Vásquez PT  11/21/2022

## 2022-11-21 NOTE — THERAPY TREATMENT NOTE
Acute Care - Occupational Therapy Treatment Note   [facility]     Patient Name: Tre Kinney  : 1932  MRN: [id number]  Today's Date: 2022  Onset of Illness/Injury or Date of Surgery: 10/06/22     Referring Physician: Janneth    Admit Date: 10/5/2022     No diagnosis found.  Patient Active Problem List   Diagnosis   • Acid reflux   • Ankle arthralgia   • Cardiac conduction disorder   • Closed fracture of distal fibula   • Arteriosclerosis of coronary artery   • Fracture of distal end of tibia   • Elevated cholesterol   • BP (high blood pressure)   • Hypertrophic polyarthritis   • OP (osteoporosis)   • Right heart failure (HCC)   • Heart failure, chronic, right-sided (HCC)   • SOB (shortness of breath)   • Palpitations   • CHF (congestive heart failure) (HCC)   • Congestive heart failure (HCC)   • Precordial pain   • Chronic anticoagulation   • Hypokalemia   • Anemia, unspecified type   • Generalized weakness     Past Medical History:   Diagnosis Date   • Anemia    • Arthritis    • CHF (congestive heart failure) ([facility])    • Chronic kidney disease    • Coronary artery disease    • H/O blood clots    • History of transfusion     no reaction    • Hypertension      Past Surgical History:   Procedure Laterality Date   • ABDOMINAL SURGERY     • APPENDECTOMY     • CARDIAC SURGERY     •  SECTION     • COLONOSCOPY N/A 3/7/2018    Procedure: COLONOSCOPY;  Surgeon: Willie Gresham MD;  Location:  [facility];  Service:    • CORONARY ANGIOPLASTY WITH STENT PLACEMENT     • ENDOSCOPY N/A 3/6/2018    Procedure: ESOPHAGOGASTRODUODENOSCOPY;  Surgeon: Willie Gresham MD;  Location:  [facility];  Service:    • PACEMAKER IMPLANTATION      left side    • SKIN BIOPSY     • TUBAL ABDOMINAL LIGATION           OT ASSESSMENT FLOWSHEET (last 12 hours)     OT Evaluation and Treatment     Row Name 22 1340                   OT Time and Intention    Document Type therapy note (daily note)  -KR        Mode of  Treatment occupational therapy  -KR        Patient Effort good  -KR        Comment Pt seen on this date for continued ther ex from seated as tolerated. BUE AROM/endurance, fxl mobility training for sit/stand/ambulation. Facetime communication achieved throughout session with pt. Willian giang. OT to continue as tolerated.  -KR           General Information    General Observations of Patient Pt frustrated at inability to perform sit/stand/ambulation as desired. Therapist encouraged pt and reassured we were aware she was giving a good effort  -KR           Chair-Bed Transfer    Chair-Bed [place] (Transfers) moderate assist (50% patient effort);2 person assist  -KR        Assistive Device (Chair-Bed Transfers) walker, front-wheeled  -KR           Sit-Stand Transfer    Sit-Stand [place] (Transfers) moderate assist (50% patient effort);2 person assist  -KR        Assistive Device (Sit-Stand Transfers) walker, front-wheeled  -KR           Shoulder (Therapeutic Exercise)    Shoulder AROM (Therapeutic Exercise) bilateral;flexion;extension;aBduction;aDduction;sitting;10 repetitions;3 sets  -KR           Elbow/Forearm (Therapeutic Exercise)    Elbow/Forearm (Therapeutic Exercise) AROM (active range of motion)  -KR        Elbow/Forearm AROM (Therapeutic Exercise) bilateral;flexion;extension  -KR           Hand (Therapeutic Exercise)    Hand AROM/AAROM (Therapeutic Exercise) bilateral;AROM (active range of motion)  -KR           Plan of Care Review    Plan of Care Reviewed With patient  -KR           Positioning and Restraints    Pre-Treatment Position sitting in chair/recliner  -KR        Post Treatment Position bed  -KR        In Bed supine;call light within reach;encouraged to call for assist  -KR           Progress Summary (OT)    Progress Toward Functional Goals (OT) progress toward functional goals is gradual  -KR              User Key  (r) = Recorded By, (t) = Taken By, (c) = Cosigned By    Initials Name Effective  Andres Borges OT 06/16/21 -                        OT Recommendation and Plan     Progress Toward Functional Goals (OT): progress toward functional goals is gradual  Plan of Care Review  Plan of Care Reviewed With: patient  Progress: improving  Plan of Care Reviewed With: patient     Outcome Measures     Row Name 11/19/22 1255 11/19/22 1200 11/19/22 1051       How much help from another person do you currently need...    Turning from your back to your side while in flat bed without using bedrails? -- -- 2  -KH    Moving from lying on back to sitting on the side of a flat bed without bedrails? -- -- 3  -KH    Moving to and from a bed to a chair (including a wheelchair)? -- -- 2  -KH    Standing up from a chair using your arms (e.g., wheelchair, bedside chair)? -- -- 2  -KH    Climbing 3-5 steps with a railing? -- -- 1  -KH    To walk in hospital room? -- -- 2  -KH    AM-PAC 6 Clicks Score (PT) -- -- 12  -KH       How much help from another is currently needed...    Putting on and taking off regular lower body clothing? 2  -KP 2  -KP --    Bathing (including washing, rinsing, and drying) 2  -KP 2  -KP --    Toileting (which includes using toilet bed pan or urinal) 2  -KP 2  -KP --    Putting on and taking off regular upper body clothing 3  -KP 3  -KP --    Taking care of personal grooming (such as brushing teeth) 3  -KP 3  -KP --    Eating meals 4  -KP 4  -KP --    AM-PAC 6 Clicks Score (OT) 16  -KP 16  -KP --       Functional Assessment    Outcome Measure Options -- -- AM-PAC 6 Clicks Basic Mobility (PT)  -          User Key  (r) = Recorded By, (t) = Taken By, (c) = Cosigned By    Initials Name Provider Type    Linn Richardson, OT Occupational Therapist    Radha Yusuf, PT Physical Therapist                Time Calculation:     Therapy Charges for Today     Code Description Service Date Service Provider Modifiers Qty    63883271457  OT THERAPEUTIC ACT EA 15 MIN 11/21/2022 Andres Uriarte OT  GO 1               Andres Uriarte, OT  11/21/2022

## 2022-11-22 PROCEDURE — 97530 THERAPEUTIC ACTIVITIES: CPT

## 2022-11-22 PROCEDURE — 97110 THERAPEUTIC EXERCISES: CPT

## 2022-11-22 PROCEDURE — 97116 GAIT TRAINING THERAPY: CPT

## 2022-11-22 RX ADMIN — TAMSULOSIN HYDROCHLORIDE 0.4 MG: 0.4 CAPSULE ORAL at 08:28

## 2022-11-22 RX ADMIN — HYDRALAZINE HYDROCHLORIDE 5 MG: 25 TABLET, FILM COATED ORAL at 05:57

## 2022-11-22 RX ADMIN — METOPROLOL TARTRATE 25 MG: 25 TABLET, FILM COATED ORAL at 21:01

## 2022-11-22 RX ADMIN — Medication 10 MG: at 21:01

## 2022-11-22 RX ADMIN — FUROSEMIDE 40 MG: 40 TABLET ORAL at 17:50

## 2022-11-22 RX ADMIN — Medication 1 TABLET: at 08:28

## 2022-11-22 RX ADMIN — PANTOPRAZOLE SODIUM 40 MG: 40 TABLET, DELAYED RELEASE ORAL at 05:57

## 2022-11-22 RX ADMIN — GABAPENTIN 300 MG: 300 CAPSULE ORAL at 21:01

## 2022-11-22 RX ADMIN — FUROSEMIDE 40 MG: 40 TABLET ORAL at 08:28

## 2022-11-22 RX ADMIN — Medication 10 ML: at 21:02

## 2022-11-22 RX ADMIN — APIXABAN 5 MG: 5 TABLET, FILM COATED ORAL at 21:01

## 2022-11-22 RX ADMIN — METOPROLOL TARTRATE 25 MG: 25 TABLET, FILM COATED ORAL at 08:28

## 2022-11-22 RX ADMIN — ESCITALOPRAM 10 MG: 10 TABLET, FILM COATED ORAL at 08:28

## 2022-11-22 RX ADMIN — Medication 10 ML: at 08:29

## 2022-11-22 RX ADMIN — Medication 10 ML: at 21:01

## 2022-11-22 RX ADMIN — APIXABAN 5 MG: 5 TABLET, FILM COATED ORAL at 08:28

## 2022-11-22 RX ADMIN — HYDRALAZINE HYDROCHLORIDE 5 MG: 25 TABLET, FILM COATED ORAL at 21:01

## 2022-11-22 RX ADMIN — HYDRALAZINE HYDROCHLORIDE 5 MG: 25 TABLET, FILM COATED ORAL at 15:14

## 2022-11-22 NOTE — PROGRESS NOTES
Pt admitted to swing bed on 10/5 for additional therapy in the setting of generalized weakness/debility. Pt not seen or examined today. I have reviewed her VS. I have discussed her care with SONAM Kurtz who does not report any acute events or issues that need to be addressed. BP continues to fluctuate but will allow some relative HTN in the setting of her advanced age. Cont current antihypertensive regimen. Cont Eliquis for treatment of DVT. Cont PT/OT. Pt currently approved for swing bed through 11/28 with plans for further review at that time.       Mustapha Loredo,   11/22/22  15:52 EDT

## 2022-11-22 NOTE — THERAPY TREATMENT NOTE
Acute Care - Physical Therapy Treatment Note   [place]     Patient Name: Tre Kinney  : 1932  MRN: [id number]  Today's Date: 2022   Onset of Illness/Injury or Date of Surgery: 10/06/22  Visit Dx:   No diagnosis found.  Patient Active Problem List   Diagnosis   • Acid reflux   • Ankle arthralgia   • Cardiac conduction disorder   • Closed fracture of distal fibula   • Arteriosclerosis of coronary artery   • Fracture of distal end of tibia   • Elevated cholesterol   • BP (high blood pressure)   • Hypertrophic polyarthritis   • OP (osteoporosis)   • Right heart failure (HCC)   • Heart failure, chronic, right-sided (HCC)   • SOB (shortness of breath)   • Palpitations   • CHF (congestive heart failure) (HCC)   • Congestive heart failure (HCC)   • Precordial pain   • Chronic anticoagulation   • Hypokalemia   • Anemia, unspecified type   • Generalized weakness     Past Medical History:   Diagnosis Date   • Anemia    • Arthritis    • CHF (congestive heart failure) (HCC)    • Chronic kidney disease    • Coronary artery disease    • H/O blood clots    • History of transfusion     no reaction    • Hypertension      Past Surgical History:   Procedure Laterality Date   • ABDOMINAL SURGERY     • APPENDECTOMY     • CARDIAC SURGERY     •  SECTION     • COLONOSCOPY N/A 3/7/2018    Procedure: COLONOSCOPY;  Surgeon: Willie Gresham MD;  Location:  [facility];  Service:    • CORONARY ANGIOPLASTY WITH STENT PLACEMENT     • ENDOSCOPY N/A 3/6/2018    Procedure: ESOPHAGOGASTRODUODENOSCOPY;  Surgeon: Willie Gresham MD;  Location:  [facility];  Service:    • PACEMAKER IMPLANTATION      left side    • SKIN BIOPSY     • TUBAL ABDOMINAL LIGATION       PT Assessment (last 12 hours)     PT Evaluation and Treatment     Row Name 22 1340          Physical Therapy Time and Intention    Subjective Information complains of;weakness  -CT     Document Type therapy note (daily note)  -CT     Mode of Treatment  physical therapy  -CT     Patient Effort good  -CT     Comment Treatment session [id number]. Pelon Dorsey via facetime at start of session but was unavailable for entire session. Pt transfered to chair with Max A x 2 4 ft and performed seated ther ex in chair.  -CT     Row Name 11/22/22 1340          Bed Mobility    Bed Mobility supine-sit;scooting/bridging  -CT     Supine-Sit Weaver (Bed Mobility) standby assist;contact guard  -CT     Row Name 11/22/22 1340          Bed-Chair Transfer    Bed-Chair Weaver (Transfers) maximum assist (25% patient effort);2 person assist;verbal cues;nonverbal cues (demo/gesture)  -CT     Assistive Device (Bed-Chair Transfers) walker, front-wheeled  -CT     Row Name 11/22/22 1340          Sit-Stand Transfer    Sit-Stand Weaver (Transfers) maximum assist (25% patient effort);2 person assist;verbal cues;nonverbal cues (demo/gesture)  -CT     Assistive Device (Sit-Stand Transfers) walker, front-wheeled  -CT     Row Name 11/22/22 1340          Stand-Sit Transfer    Stand-Sit Weaver (Transfers) maximum assist (25% patient effort);2 person assist;verbal cues;nonverbal cues (demo/gesture)  -CT     Assistive Device (Stand-Sit Transfers) walker, front-wheeled  -CT     Row Name 11/22/22 1340          Gait/Stairs (Locomotion)    Gait/Stairs Locomotion gait/ambulation assistive device  -CT     Weaver Level (Gait) maximum assist (25% patient effort);2 person assist;verbal cues;nonverbal cues (demo/gesture)  -CT     Assistive Device (Gait) walker, front-wheeled  -CT     Distance in Feet (Gait) 4 ft during transfer to chair  -CT     Pattern (Gait) step-to  -CT     Deviations/Abnormal Patterns (Gait) stride length decreased;gait speed decreased;festinating/shuffling  -CT     Row Name 11/22/22 1340          Motor Skills    Therapeutic Exercise --  BLE seated ther ex: seated marches, LAQ, AP, hip abd/add, glut sets x 2  -CT     Row Name 11/22/22 1340          Coping    Observed  Emotional State calm;cooperative  -CT     Verbalized Emotional State acceptance  -CT     Row Name 11/22/22 1340          Plan of Care Review    Plan of Care Reviewed With patient  -CT     Row Name 11/22/22 1340          Positioning and Restraints    Pre-Treatment Position in bed  -CT     Post Treatment Position chair  -CT     In Chair sitting;call light within reach;encouraged to call for assist;with OT  -CT           User Key  (r) = Recorded By, (t) = Taken By, (c) = Cosigned By    Initials Name Provider Type    CT Bhargavi Vásquez PT Physical Therapist                Physical Therapy Education     Title: PT OT SLP Therapies (Done)     Topic: Physical Therapy (Done)     Point: Mobility training (Done)     Learning Progress Summary           Patient Acceptance, E,TB, VU by RM at 11/20/2022 1449    Acceptance, E,TB, VU by RM at 11/18/2022 1021    Acceptance, E,TB, VU by RH at 11/16/2022 1110    Acceptance, E,TB, VU by KK at 11/14/2022 1638    Acceptance, E,TB, VU by KK at 11/13/2022 1648    Acceptance, E,TB, VU by KG at 11/10/2022 1827    Acceptance, E,TB, VU by DM at 11/9/2022 1638    Acceptance, E,TB, VU by KK at 11/8/2022 1754    Acceptance, E,TB, VU by KK at 11/7/2022 1635    Acceptance, E,D, VU,NR by LL at 11/5/2022 1325    Acceptance, E, VU by EB at 11/4/2022 1448    Acceptance, E,TB, VU by KM at 11/4/2022 1101                   Point: Home exercise program (Done)     Learning Progress Summary           Patient Acceptance, E,TB, VU by RM at 11/20/2022 1449    Acceptance, E,TB, VU by RM at 11/18/2022 1021    Acceptance, E,TB, VU by RH at 11/16/2022 1110    Acceptance, E,TB, VU by KK at 11/14/2022 1638    Acceptance, E,TB, VU by KK at 11/13/2022 1648    Acceptance, E,TB, VU by KG at 11/10/2022 1827    Acceptance, E,TB, VU by DM at 11/9/2022 1638    Acceptance, E,TB, VU by KK at 11/8/2022 1754    Acceptance, E,TB, VU by KK at 11/7/2022 1635    Acceptance, E,D, VU,NR by  at 11/5/2022 1325    Acceptance, E, VU by  EB at 11/4/2022 1448    Acceptance, E,TB, VU by KM at 11/4/2022 1101                   Point: Body mechanics (Done)     Learning Progress Summary           Patient Acceptance, E,TB, VU by RM at 11/20/2022 1449    Acceptance, E,TB, VU by RM at 11/18/2022 1021    Acceptance, E,TB, VU by RH at 11/16/2022 1110    Acceptance, E,TB, VU by KK at 11/14/2022 1638    Acceptance, E,TB, VU by KK at 11/13/2022 1648    Acceptance, E,TB, VU by KG at 11/10/2022 1827    Acceptance, E,TB, VU by DM at 11/9/2022 1638    Acceptance, E,TB, VU by KK at 11/8/2022 1754    Acceptance, E,TB, VU by KK at 11/7/2022 1635    Acceptance, E,D, VU,NR by LL at 11/5/2022 1325    Acceptance, E, VU by EB at 11/4/2022 1448    Acceptance, E,TB, VU by KM at 11/4/2022 1101                   Point: Precautions (Done)     Learning Progress Summary           Patient Acceptance, E,TB, VU by RM at 11/20/2022 1449    Acceptance, E,TB, VU by RM at 11/18/2022 1021    Acceptance, E,TB, VU by RH at 11/16/2022 1110    Acceptance, E,TB, VU by KK at 11/14/2022 1638    Acceptance, E,TB, VU by KK at 11/13/2022 1648    Acceptance, E,TB, VU by KG at 11/10/2022 1827    Acceptance, E,TB, VU by DM at 11/9/2022 1638    Acceptance, E,TB, VU by KK at 11/8/2022 1754    Acceptance, E,TB, VU by KK at 11/7/2022 1635    Acceptance, E,D, VU,NR by LL at 11/5/2022 1325    Acceptance, E, VU by EB at 11/4/2022 1448    Acceptance, E,TB, VU by KM at 11/4/2022 1101                               User Key     Initials Effective Dates Name Provider Type Discipline     05/02/16 -  Neda Garcia, PTA Physical Therapist Assistant PT    RM 06/14/22 -  Bethany Krause RN Registered Nurse Nurse    DM 06/16/21 -  Moniz, Deborah, RN Registered Nurse Nurse    KM 05/24/22 -  Mao Vázquez, PT Physical Therapist PT    EB 09/22/22 -  Cedric Oliver, RN Registered Nurse Nurse    KK 06/27/22 -  Chelsi Bolivar, RN Registered Nurse Nurse    KG 09/22/22 -  Verónica Rodriguez, RN Registered Nurse Nurse    RH  08/03/22 -  Jung Russell RN Extern Registered Nurse Nurse              PT Recommendation and Plan  Anticipated Discharge Disposition (PT): home with 24/7 care, skilled nursing facility  Planned Therapy Interventions (PT): balance training, bed mobility training, gait training, home exercise program, manual therapy techniques, motor coordination training, neuromuscular re-education, patient/family education, postural re-education, strengthening, transfer training  Therapy Frequency (PT): 5 times/wk (5-6 times/wk)  Progress Summary (PT)  Progress Toward Functional Goals (PT): progress toward functional goals is gradual  Plan of Care Reviewed With: patient  Progress: no change       Time Calculation:    PT Charges     Row Name 11/22/22 1348             Time Calculation    Start Time 1255  -CT      Stop Time 1330  -CT      Time Calculation (min) 35 min  -CT      PT Received On 11/22/22  -CT            User Key  (r) = Recorded By, (t) = Taken By, (c) = Cosigned By    Initials Name Provider Type    CT Bhargavi Vásquez, PT Physical Therapist              Therapy Charges for Today     Code Description Service Date Service Provider Modifiers Qty    75550288772 HC PT THER PROC EA 15 MIN 11/21/2022 Bhargavi Vásquez, PT GP 1    94702164109 HC PT THERAPEUTIC ACT EA 15 MIN 11/21/2022 Bhargavi Vásquez, PT GP 1    36107727441 HC GAIT TRAINING EA 15 MIN 11/22/2022 Bhargavi Vásquez, PT GP 1    74515936702 HC PT THER PROC EA 15 MIN 11/22/2022 Bhargavi Vásquez, PT GP 1          PT G-Codes  Outcome Measure Options: AM-PAC 6 Clicks Basic Mobility (PT)  AM-PAC 6 Clicks Score (PT): 16  AM-PAC 6 Clicks Score (OT): 16    Bhargavi Vásquez PT  11/22/2022

## 2022-11-22 NOTE — THERAPY TREATMENT NOTE
Acute Care - Occupational Therapy Treatment Note   [place]     Patient Name: Tre Kinney  : 1932  MRN: [id number]  Today's Date: 2022  Onset of Illness/Injury or Date of Surgery: 10/06/22     Referring Physician: Janneth    Admit Date: 10/5/2022     No diagnosis found.  Patient Active Problem List   Diagnosis   • Acid reflux   • Ankle arthralgia   • Cardiac conduction disorder   • Closed fracture of distal fibula   • Arteriosclerosis of coronary artery   • Fracture of distal end of tibia   • Elevated cholesterol   • BP (high blood pressure)   • Hypertrophic polyarthritis   • OP (osteoporosis)   • Right heart failure (HCC)   • Heart failure, chronic, right-sided (HCC)   • SOB (shortness of breath)   • Palpitations   • CHF (congestive heart failure) (HCC)   • Congestive heart failure (HCC)   • Precordial pain   • Chronic anticoagulation   • Hypokalemia   • Anemia, unspecified type   • Generalized weakness     Past Medical History:   Diagnosis Date   • Anemia    • Arthritis    • CHF (congestive heart failure) ([facility])    • Chronic kidney disease    • Coronary artery disease    • H/O blood clots    • History of transfusion     no reaction    • Hypertension      Past Surgical History:   Procedure Laterality Date   • ABDOMINAL SURGERY     • APPENDECTOMY     • CARDIAC SURGERY     •  SECTION     • COLONOSCOPY N/A 3/7/2018    Procedure: COLONOSCOPY;  Surgeon: Willie Gresham MD;  Location:  [facility];  Service:    • CORONARY ANGIOPLASTY WITH STENT PLACEMENT     • ENDOSCOPY N/A 3/6/2018    Procedure: ESOPHAGOGASTRODUODENOSCOPY;  Surgeon: Willie Gresham MD;  Location:  [facility];  Service:    • PACEMAKER IMPLANTATION      left side    • SKIN BIOPSY     • TUBAL ABDOMINAL LIGATION           OT ASSESSMENT FLOWSHEET (last 12 hours)     OT Evaluation and Treatment     Row Name 22 1344                   OT Time and Intention    Document Type therapy note (daily note)  -KR        Mode of  Treatment occupational therapy  -KR        Patient Effort good  -KR        Comment OT assisted PT in transfer from bed/chair. Mod assist x 2. Pt improved activity tolerance on this date and able to perform transfer/ambulation to bedside chair with use of FW walker. Facetime communication achieved with son, Willian, upon therapy initiation. Willian had to attend scheduled appt. and unable to remain for therapy session. Following PT exercises, BUE ther ex completed from sitting edge of chair. Pt remained cooperative throughout all sessions today and motivated to improve fxl status.  -KR           Cognition    Affect/Mental Status (Cognition) WFL  -KR        Follows Commands (Cognition) WFL  -KR           Shoulder (Therapeutic Exercise)    Shoulder AROM (Therapeutic Exercise) bilateral;flexion;extension;sitting;aBduction;aDduction  -KR        Shoulder Wand Exercises (Therapeutic Exercise) flexion;extension;aBduction;aDduction;AROM (active range of motion);sitting  -KR           Elbow/Forearm (Therapeutic Exercise)    Elbow/Forearm AROM (Therapeutic Exercise) bilateral;flexion;extension;sitting  -KR           Wrist (Therapeutic Exercise)    Wrist (Therapeutic Exercise) AROM (active range of motion)  -KR        Wrist AROM (Therapeutic Exercise) bilateral;flexion;extension  Pt completed wrist roll activity for general strengthening/activity tolerance. Pt able to complete activity and remained cooperative/motivated.  -KR           Hand (Therapeutic Exercise)    Hand AROM/AAROM (Therapeutic Exercise) bilateral;AROM (active range of motion);finger flexion;finger extension  -KR           Positioning and Restraints    Pre-Treatment Position in bed  -KR        Post Treatment Position chair  -KR        In Chair call light within reach;encouraged to call for assist  pt comfortable with no further requests  -KR              User Key  (r) = Recorded By, (t) = Taken By, (c) = Cosigned By    Initials Name Effective Dates    Andres Ayala  VELASQUEZ GORDON 06/16/21 -                        OT Recommendation and Plan     Progress Toward Functional Goals (OT): progress toward functional goals is gradual  Plan of Care Review  Plan of Care Reviewed With: patient  Progress: improving  Plan of Care Reviewed With: patient     Outcome Measures     Row Name 11/22/22 4023             How much help from another is currently needed...    Putting on and taking off regular lower body clothing? 2  -KR      Bathing (including washing, rinsing, and drying) 2  -KR      Toileting (which includes using toilet bed pan or urinal) 2  -KR      Putting on and taking off regular upper body clothing 3  -KR      Taking care of personal grooming (such as brushing teeth) 3  -KR      Eating meals 4  -KR      AM-PAC 6 Clicks Score (OT) 16  -KR            User Key  (r) = Recorded By, (t) = Taken By, (c) = Cosigned By    Initials Name Provider Type    Andres Ayala OT Occupational Therapist                Time Calculation:    Time Calculation- OT     Row Name 11/22/22 9472             Time Calculation- OT    Total Timed Code Minutes- OT 15 minute(s)  -KR            User Key  (r) = Recorded By, (t) = Taken By, (c) = Cosigned By    Initials Name Provider Type    Andres Ayala OT Occupational Therapist              Therapy Charges for Today     Code Description Service Date Service Provider Modifiers Qty    44377303148 HC OT THERAPEUTIC ACT EA 15 MIN 11/21/2022 Andres Uriarte OT GO 1    92586175588 HC OT THERAPEUTIC ACT EA 15 MIN 11/22/2022 Andres Uriarte OT GO 1               Andres Uriarte OT  11/22/2022

## 2022-11-22 NOTE — PLAN OF CARE
Goal Outcome Evaluation:  Plan of Care Reviewed With: patient        Progress: no change  Outcome Evaluation: Pt sat in chair a while today, no c/o of pain/discomfort, no changes, will continue to monitor

## 2022-11-23 LAB
BACTERIA SPEC AEROBE CULT: NORMAL
BACTERIA SPEC AEROBE CULT: NORMAL

## 2022-11-23 PROCEDURE — 97110 THERAPEUTIC EXERCISES: CPT

## 2022-11-23 PROCEDURE — 97116 GAIT TRAINING THERAPY: CPT

## 2022-11-23 PROCEDURE — 97535 SELF CARE MNGMENT TRAINING: CPT

## 2022-11-23 RX ADMIN — PANTOPRAZOLE SODIUM 40 MG: 40 TABLET, DELAYED RELEASE ORAL at 06:41

## 2022-11-23 RX ADMIN — METOPROLOL TARTRATE 25 MG: 25 TABLET, FILM COATED ORAL at 21:57

## 2022-11-23 RX ADMIN — APIXABAN 5 MG: 5 TABLET, FILM COATED ORAL at 21:56

## 2022-11-23 RX ADMIN — ESCITALOPRAM 10 MG: 10 TABLET, FILM COATED ORAL at 10:23

## 2022-11-23 RX ADMIN — Medication 1 TABLET: at 10:23

## 2022-11-23 RX ADMIN — Medication 10 ML: at 10:23

## 2022-11-23 RX ADMIN — Medication 10 MG: at 21:56

## 2022-11-23 RX ADMIN — APIXABAN 5 MG: 5 TABLET, FILM COATED ORAL at 10:22

## 2022-11-23 RX ADMIN — Medication 10 ML: at 10:24

## 2022-11-23 RX ADMIN — FUROSEMIDE 40 MG: 40 TABLET ORAL at 10:23

## 2022-11-23 RX ADMIN — HYDRALAZINE HYDROCHLORIDE 5 MG: 25 TABLET, FILM COATED ORAL at 15:29

## 2022-11-23 RX ADMIN — HYDRALAZINE HYDROCHLORIDE 5 MG: 25 TABLET, FILM COATED ORAL at 06:41

## 2022-11-23 RX ADMIN — Medication 10 ML: at 21:58

## 2022-11-23 RX ADMIN — TAMSULOSIN HYDROCHLORIDE 0.4 MG: 0.4 CAPSULE ORAL at 10:23

## 2022-11-23 RX ADMIN — METOPROLOL TARTRATE 25 MG: 25 TABLET, FILM COATED ORAL at 10:22

## 2022-11-23 RX ADMIN — FUROSEMIDE 40 MG: 40 TABLET ORAL at 17:24

## 2022-11-23 RX ADMIN — GABAPENTIN 300 MG: 300 CAPSULE ORAL at 21:56

## 2022-11-23 NOTE — PLAN OF CARE
Goal Outcome Evaluation:      The patient sat up in the chair for 1.5  hours, the beginning of shift. She ambulated with gait belt back to bed this shift. She has not had any acute events overnight. No complaints this shift. She rested well overnight. Will continue with the plan of care.

## 2022-11-23 NOTE — THERAPY TREATMENT NOTE
Acute Care - Occupational Therapy Treatment Note   [place]     Patient Name: Tre Kinney  : 1932  MRN: [id number]  Today's Date: 2022  Onset of Illness/Injury or Date of Surgery: 10/06/22     Referring Physician: Janneth    Admit Date: 10/5/2022     No diagnosis found.  Patient Active Problem List   Diagnosis   • Acid reflux   • Ankle arthralgia   • Cardiac conduction disorder   • Closed fracture of distal fibula   • Arteriosclerosis of coronary artery   • Fracture of distal end of tibia   • Elevated cholesterol   • BP (high blood pressure)   • Hypertrophic polyarthritis   • OP (osteoporosis)   • Right heart failure (HCC)   • Heart failure, chronic, right-sided (HCC)   • SOB (shortness of breath)   • Palpitations   • CHF (congestive heart failure) (HCC)   • Congestive heart failure (HCC)   • Precordial pain   • Chronic anticoagulation   • Hypokalemia   • Anemia, unspecified type   • Generalized weakness     Past Medical History:   Diagnosis Date   • Anemia    • Arthritis    • CHF (congestive heart failure) ([facility])    • Chronic kidney disease    • Coronary artery disease    • H/O blood clots    • History of transfusion     no reaction    • Hypertension      Past Surgical History:   Procedure Laterality Date   • ABDOMINAL SURGERY     • APPENDECTOMY     • CARDIAC SURGERY     •  SECTION     • COLONOSCOPY N/A 3/7/2018    Procedure: COLONOSCOPY;  Surgeon: Willie Gresham MD;  Location:  [facility];  Service:    • CORONARY ANGIOPLASTY WITH STENT PLACEMENT     • ENDOSCOPY N/A 3/6/2018    Procedure: ESOPHAGOGASTRODUODENOSCOPY;  Surgeon: Willie Gresham MD;  Location:  [facility];  Service:    • PACEMAKER IMPLANTATION      left side    • SKIN BIOPSY     • TUBAL ABDOMINAL LIGATION           OT ASSESSMENT FLOWSHEET (last 12 hours)     OT Evaluation and Treatment     Row Name 22 1412                   OT Time and Intention    Subjective Information no complaints  -        Document Type  therapy note (daily note)  -        Mode of Treatment individual therapy;occupational therapy  -        Total Minutes, Occupational Therapy 55  -KP        Patient Effort good  -KP        Symptoms Noted During/After Treatment none  -KP        Comment Patient seen for OT session. OT assisted PT with functioanl transfers/mobility during session for safety. Attempted to Facetime patient's son with no answer.  -           General Information    Patient Profile Reviewed yes  -        General Observations of Patient Patient seated up in chair after lunch upon arrival. She had fair tolerance for treatment with intermittent encouragement to attempt sit to stands and transfers. She performed increased sit to stands on this date compared to prior.  Seen 12:40 to 13:35  -        Existing Precautions/Restrictions fall  -        Limitations/Impairments safety/cognitive  -           Pain Assessment    Pretreatment Pain Rating 0/10 - no pain  -        Posttreatment Pain Rating 0/10 - no pain  -           Toileting Assessment/Training    [place] Level (Toileting) dependent (less than 25% patient effort);toileting skills  -        Comment, (Toileting) Maximal assist X1 to stand with assist of second person as needed and with toilet hygiene  -           Toilet Transfer    Type (Toilet Transfer) stand pivot/stand step  -        [place] Level (Toilet Transfer) maximum assist (25% patient effort);2 person assist;verbal cues;nonverbal cues (demo/gesture)  -        Assistive Device (Toilet Transfer) commode, bedside without drop arms;walker, front-wheeled  -        Comment, (Toilet Transfer) 2-3 steps to and from BSC and recliner  -           Motor Skills    Functional Endurance fair  -        Therapeutic Exercise other (see comments)  Minnie yellow resistance theraband X20 reps X2 sets through functional movement patterns from up in chair  -           Positioning and Restraints    Pre-Treatment  Position sitting in chair/recliner  -        Post Treatment Position chair  -KP        In Chair call light within reach  -KP              User Key  (r) = Recorded By, (t) = Taken By, (c) = Cosigned By    Initials Name Effective Dates    Linn Richardson, VELASQUEZ 06/16/21 -                        OT Recommendation and Plan     Progress Toward Functional Goals (OT): progress toward functional goals is fair  Plan of Care Review  Plan of Care Reviewed With: patient  Progress: improving  Outcome Evaluation: Patient seen for OT treatment session (9:06 to 9:45). She declined calling son due to patient seen for self care tasks. She performed ADLs from recliner as documented. She is able to perform upper body tasks with set-up, but requires maximal assist with ADLs requiring standing. Continue plan of care.  Plan of Care Reviewed With: patient  Outcome Evaluation: Patient seen for OT treatment session (9:06 to 9:45). She declined calling son due to patient seen for self care tasks. She performed ADLs from recliner as documented. She is able to perform upper body tasks with set-up, but requires maximal assist with ADLs requiring standing. Continue plan of care.     Outcome Measures     Row Name 11/22/22 2117             How much help from another is currently needed...    Putting on and taking off regular lower body clothing? 2  -KR      Bathing (including washing, rinsing, and drying) 2  -KR      Toileting (which includes using toilet bed pan or urinal) 2  -KR      Putting on and taking off regular upper body clothing 3  -KR      Taking care of personal grooming (such as brushing teeth) 3  -KR      Eating meals 4  -KR      AM-PAC 6 Clicks Score (OT) 16  -KR            User Key  (r) = Recorded By, (t) = Taken By, (c) = Cosigned By    Initials Name Provider Type    Andres Ayala OT Occupational Therapist                Time Calculation:    Time Calculation- OT     Row Name 11/23/22 0351             Time Calculation- OT    OT  Start Time 1240  -      OT Stop Time 1330  -      OT Time Calculation (min) 50 min  -      OT Received On 11/23/22  -            User Key  (r) = Recorded By, (t) = Taken By, (c) = Cosigned By    Initials Name Provider Type    Linn Richardson OT Occupational Therapist              Therapy Charges for Today     Code Description Service Date Service Provider Modifiers Qty    84854289276 HC OT SELF CARE/MGMT/TRAIN EA 15 MIN 11/23/2022 Linn Quevedo OT GO 1    27066640930 HC OT THER PROC EA 15 MIN 11/23/2022 Linn Quevedo OT GO 1               Linn Quevedo OT  11/23/2022

## 2022-11-23 NOTE — THERAPY TREATMENT NOTE
Acute Care - Physical Therapy Treatment Note   [place]     Patient Name: Tre Kinney  : 1932  MRN: [id number]  Today's Date: 2022   Onset of Illness/Injury or Date of Surgery: 10/06/22  Visit Dx:   No diagnosis found.  Patient Active Problem List   Diagnosis   • Acid reflux   • Ankle arthralgia   • Cardiac conduction disorder   • Closed fracture of distal fibula   • Arteriosclerosis of coronary artery   • Fracture of distal end of tibia   • Elevated cholesterol   • BP (high blood pressure)   • Hypertrophic polyarthritis   • OP (osteoporosis)   • Right heart failure (HCC)   • Heart failure, chronic, right-sided (HCC)   • SOB (shortness of breath)   • Palpitations   • CHF (congestive heart failure) (HCC)   • Congestive heart failure (HCC)   • Precordial pain   • Chronic anticoagulation   • Hypokalemia   • Anemia, unspecified type   • Generalized weakness     Past Medical History:   Diagnosis Date   • Anemia    • Arthritis    • CHF (congestive heart failure) (HCC)    • Chronic kidney disease    • Coronary artery disease    • H/O blood clots    • History of transfusion     no reaction    • Hypertension      Past Surgical History:   Procedure Laterality Date   • ABDOMINAL SURGERY     • APPENDECTOMY     • CARDIAC SURGERY     •  SECTION     • COLONOSCOPY N/A 3/7/2018    Procedure: COLONOSCOPY;  Surgeon: Willie Gresham MD;  Location:  [facility];  Service:    • CORONARY ANGIOPLASTY WITH STENT PLACEMENT     • ENDOSCOPY N/A 3/6/2018    Procedure: ESOPHAGOGASTRODUODENOSCOPY;  Surgeon: Willie Gresham MD;  Location:  [facility];  Service:    • PACEMAKER IMPLANTATION      left side    • SKIN BIOPSY     • TUBAL ABDOMINAL LIGATION       PT Assessment (last 12 hours)     PT Evaluation and Treatment     Row Name 22 1446          Physical Therapy Time and Intention    Subjective Information no complaints  -CT     Document Type therapy note (daily note)  -CT     Mode of Treatment physical  therapy  -CT     Patient Effort good  -CT     Comment Pts treatment session [id number]. Called padmaja Dorsey via [employer] but no answer. Pt performed sit to stand x 3 with weight shifting and then transfered on/off of BSC with additional standing trials for hygiene. Pt performed sitting ther ex.  -CT     Row Name 11/23/22 1446          General Information    Patient Profile Reviewed yes  -CT     Existing Precautions/Restrictions fall  -CT     Limitations/Impairments safety/cognitive  -CT     Row Name 11/23/22 1446          Pain    Pretreatment Pain Rating --  no pain reported  -CT     Row Name 11/23/22 1446          Bed Mobility    Comment, (Bed Mobility) pt up in chair pre/post therapy  -CT     Row Name 11/23/22 1446          Sit-Stand Transfer    Sit-Stand [place] (Transfers) maximum assist (25% patient effort);2 person assist;verbal cues;nonverbal cues (demo/gesture)  -CT     Assistive Device (Sit-Stand Transfers) walker, front-wheeled  -CT     Comment, (Sit-Stand Transfer) 6 total trials  -CT     Row Name 11/23/22 1446          Stand-Sit Transfer    Stand-Sit [place] (Transfers) maximum assist (25% patient effort);2 person assist;verbal cues;nonverbal cues (demo/gesture)  -CT     Assistive Device (Stand-Sit Transfers) walker, front-wheeled  -CT     Row Name 11/23/22 1446          Toilet Transfer    Type (Toilet Transfer) stand pivot/stand step  -CT     [place] Level (Toilet Transfer) maximum assist (25% patient effort);2 person assist;verbal cues;nonverbal cues (demo/gesture)  -CT     Assistive Device (Toilet Transfer) commode, bedside without drop arms;walker, front-wheeled  -CT     Row Name 11/23/22 1446          Gait/Stairs (Locomotion)    Gait/Stairs Locomotion gait/ambulation assistive device  -CT     [place] Level (Gait) maximum assist (25% patient effort);2 person assist;verbal cues;nonverbal cues (demo/gesture)  -CT     Assistive Device (Gait) walker, front-wheeled  -CT     Distance in Feet  (Gait) 2-3 ft to/from [facility]  -CT     Pattern (Gait) step-to  -CT     Deviations/Abnormal Patterns (Gait) stride length decreased;gait speed decreased;festinating/shuffling  -CT     Row Name 11/23/22 1446          Balance    Balance Interventions standing;minimal challenge;weight shifting activity  -CT     Row Name 11/23/22 1446          Motor Skills    Therapeutic Exercise --  BLE seated ther ex: AP, LAQ, seated marches, hip abd/add, hamstring curls yellow TB, glut sets x 2 25 reps  -CT     Row Name 11/23/22 1446          Coping    Observed Emotional State calm;cooperative  -CT     Verbalized Emotional State acceptance  -CT     Row Name 11/23/22 1446          Plan of Care Review    Plan of Care Reviewed With patient  -CT     Row Name 11/23/22 1446          Positioning and Restraints    Pre-Treatment Position sitting in chair/recliner  -CT     Post Treatment Position chair  -CT     In Chair sitting;call light within reach;encouraged to call for assist  -CT           User Key  (r) = Recorded By, (t) = Taken By, (c) = Cosigned By    Initials Name Provider Type    CT Bhargavi Vásquez, EDEL Physical Therapist                Physical Therapy Education     Title: PT OT SLP Therapies (Done)     Topic: Physical Therapy (Done)     Point: Mobility training (Done)     Learning Progress Summary           Patient Acceptance, E,TB, VU by RM at 11/20/2022 1449    Acceptance, E,TB, VU by RM at 11/18/2022 1021    Acceptance, E,TB, VU by RH at 11/16/2022 1110    Acceptance, E,TB, VU by KK at 11/14/2022 1638    Acceptance, E,TB, VU by KK at 11/13/2022 1648    Acceptance, E,TB, VU by KG at 11/10/2022 1827    Acceptance, E,TB, VU by DM at 11/9/2022 1638    Acceptance, E,TB, VU by KK at 11/8/2022 1754    Acceptance, E,TB, VU by KK at 11/7/2022 1635    Acceptance, E,D, VU,NR by LL at 11/5/2022 1325    Acceptance, E, VU by EB at 11/4/2022 1448    Acceptance, E,TB, VU by KM at 11/4/2022 1101                   Point: Home exercise program (Done)      Learning Progress Summary           Patient Acceptance, E,TB, VU by RM at 11/20/2022 1449    Acceptance, E,TB, VU by RM at 11/18/2022 1021    Acceptance, E,TB, VU by RH at 11/16/2022 1110    Acceptance, E,TB, VU by KK at 11/14/2022 1638    Acceptance, E,TB, VU by KK at 11/13/2022 1648    Acceptance, E,TB, VU by KG at 11/10/2022 1827    Acceptance, E,TB, VU by DM at 11/9/2022 1638    Acceptance, E,TB, VU by KK at 11/8/2022 1754    Acceptance, E,TB, VU by KK at 11/7/2022 1635    Acceptance, E,D, VU,NR by LL at 11/5/2022 1325    Acceptance, E, VU by EB at 11/4/2022 1448    Acceptance, E,TB, VU by KM at 11/4/2022 1101                   Point: Body mechanics (Done)     Learning Progress Summary           Patient Acceptance, E,TB, VU by RM at 11/20/2022 1449    Acceptance, E,TB, VU by RM at 11/18/2022 1021    Acceptance, E,TB, VU by RH at 11/16/2022 1110    Acceptance, E,TB, VU by KK at 11/14/2022 1638    Acceptance, E,TB, VU by KK at 11/13/2022 1648    Acceptance, E,TB, VU by KG at 11/10/2022 1827    Acceptance, E,TB, VU by DM at 11/9/2022 1638    Acceptance, E,TB, VU by KK at 11/8/2022 1754    Acceptance, E,TB, VU by KK at 11/7/2022 1635    Acceptance, E,D, VU,NR by LL at 11/5/2022 1325    Acceptance, E, VU by EB at 11/4/2022 1448    Acceptance, E,TB, VU by KM at 11/4/2022 1101                   Point: Precautions (Done)     Learning Progress Summary           Patient Acceptance, E,TB, VU by RM at 11/20/2022 1449    Acceptance, E,TB, VU by RM at 11/18/2022 1021    Acceptance, E,TB, VU by RH at 11/16/2022 1110    Acceptance, E,TB, VU by KK at 11/14/2022 1638    Acceptance, E,TB, VU by KK at 11/13/2022 1648    Acceptance, E,TB, VU by KG at 11/10/2022 1827    Acceptance, E,TB, VU by DM at 11/9/2022 1638    Acceptance, E,TB, VU by KK at 11/8/2022 1754    Acceptance, E,TB, VU by KK at 11/7/2022 1635    Acceptance, E,D, VU,NR by LL at 11/5/2022 1325    Acceptance, E, VU by EB at 11/4/2022 1448    Acceptance, E,TB, VU by KM  at 11/4/2022 1101                               User Key     Initials Effective Dates Name Provider Type Discipline    LL 05/02/16 -  Neda Garcia PTA Physical Therapist Assistant PT    RM 06/14/22 -  Bethany Krause, RN Registered Nurse Nurse    DM 06/16/21 -  Moniz, Deborah, RN Registered Nurse Nurse    KM 05/24/22 -  Mao Vázquez, PT Physical Therapist PT    EB 09/22/22 -  Cedric Oliver, RN Registered Nurse Nurse    KK 06/27/22 -  Chelsi Bolivar RN Registered Nurse Nurse    KG 09/22/22 -  Verónica Rodriguez RN Registered Nurse Nurse    RH 08/03/22 -  Jung Russell RN Extern Registered Nurse Nurse              PT Recommendation and Plan  Anticipated Discharge Disposition (PT): home with [facility], [facility] nursing [facility]  Planned Therapy Interventions (PT): balance training, bed mobility training, gait training, home exercise program, manual therapy techniques, motor coordination training, neuromuscular re-education, patient/family education, postural re-education, strengthening, transfer training  Therapy Frequency (PT): 5 times/wk (5-6 times/wk)  Progress Summary (PT)  Progress Toward Functional Goals (PT): progress toward functional goals is gradual  Plan of Care Reviewed With: patient  Progress: no change   Outcome Measures     Row Name 11/22/22 1357             How much help from another is currently needed...    Putting on and taking off regular lower body clothing? 2  -KR      Bathing (including washing, rinsing, and drying) 2  -KR      Toileting (which includes using toilet bed pan or urinal) 2  -KR      Putting on and taking off regular upper body clothing 3  -KR      Taking care of personal grooming (such as brushing teeth) 3  -KR      Eating meals 4  -KR      AM-PAC 6 Clicks Score (OT) 16  -KR            User Key  (r) = Recorded By, (t) = Taken By, (c) = Cosigned By    Initials Name Provider Type    Andres Ayala, OT Occupational Therapist                 Time Calculation:    PT Charges      Row Name 11/23/22 1456             Time Calculation    Start Time 1240  -CT      Stop Time 1335  -CT      Time Calculation (min) 55 min  -CT      PT Received On 11/23/22  -CT            User Key  (r) = Recorded By, (t) = Taken By, (c) = Cosigned By    Initials Name Provider Type    CT Bhargavi Vásquez, EDEL Physical Therapist              Therapy Charges for Today     Code Description Service Date Service Provider Modifiers Qty    62933234748 HC GAIT TRAINING EA 15 MIN 11/22/2022 Bhargavi Vásquez, PT GP 1    77776812801 HC PT THER PROC EA 15 MIN 11/22/2022 Bhargavi Vásquez, PT GP 1    62585789019 HC GAIT TRAINING EA 15 MIN 11/23/2022 Bhargavi Vásquez, PT GP 1    53824796792 HC PT THER PROC EA 15 MIN 11/23/2022 Bhargavi Vásquez, PT GP 1          PT G-Codes  Outcome Measure Options: AM-PAC 6 Clicks Basic Mobility (PT)  AM-PAC 6 Clicks Score (PT): 16  AM-PAC 6 Clicks Score (OT): 16    Bhargavi Vásquez PT  11/23/2022

## 2022-11-23 NOTE — PROGRESS NOTES
Pt admitted to swing bed on 10/5 for additional therapy in the setting of generalized weakness/debility. Pt not seen or examined today. I have reviewed her VS. I have discussed her care with SONAM Sims who does not report any acute events or issues that need to be addressed. BP better controlled today. Cont current antihypertensive regimen. Cont Eliquis for treatment of DVT. Cont PT/OT. Pt currently approved for swing bed through 11/28 with plans for further clinical review at that time. SS/CM has had continued discussions with patient and family regarding disposition including SNF and possible private pay caregiver agencies at home as pt does not wish to pursue SNF placement at this time.       Mustapha Loredo,   11/23/22  15:52 EDT

## 2022-11-23 NOTE — CASE MANAGEMENT/SOCIAL WORK
Discharge Planning Assessment   [place]     Patient Name: Tre Kinney  MRN: [id number]  Today's Date: 11/23/2022    Admit Date: 10/5/2022    Plan: Pt was admitted to swing bed on 10/5/22. Pt's insurance has approved swing bed admission through 11/28/22. Swing bed RN will submit updated clinicals to pt's insurance on 11/28/22 to request additional swing bed days. Pt plans to return home at discharge, however pt and family voices that pt will not have needed care at home. Pt and pt's family have been educated about private pay caregiver agencies that are available in her area. Pt declines SNF admission due to daily copay rate that is required by the SNF. Pt declines long-term care nursing home placement due to the nursing home's reqiurement that her monthly income will have to be paid to the nursing home minus $40 per month. Pt and pt's family voices that she is unable to afford SNF or long-term care nursing home admission. SS to follow.     Discharge Plan     Row Name 11/23/22 1040       Plan    Plan Pt was admitted to swing bed on 10/5/22. Pt's insurance has approved swing bed admission through 11/28/22. Swing bed RN will submit updated clinicals to pt's insurance on 11/28/22 to request additional swing bed days. Pt plans to return home at discharge, however pt and family voices that pt will not have needed care at home. Pt and pt's family have been educated about private pay caregiver agencies that are available in her area. Pt declines SNF admission due to daily copay rate that is required by the SNF. Pt declines long-term care nursing home placement due to the nursing home's reqiurement that her monthly income will have to be paid to the nursing home minus $40 per month. Pt and pt's family voices that she is unable to afford SNF or long-term care nursing home admission. SS to follow.              Continued Care and Services - Admitted Since 10/5/2022     Destination     Service Provider Request Status Selected  Services Address Phone Fax Patient Preferred    [facility] Accepted N/A [address] [facility] [address] [phone] --    [facility] Pending - Request Sent N/A [address] [place] [address] [phone] --    [facility] Declined  Does not have a secondary payor source N/A [address] [facility] [place] [address] [phone] --            Selected Continued Care - Prior Encounters Includes continued care and service providers with selected services from prior encounters from 7/7/2022 to 11/23/2022    Discharged on 10/5/2022 Admission date: 9/19/2022 - Discharge disposition: Swing Bed    Destination     Service Provider Selected Services Address Phone Fax Patient Preferred    [facility] [address] [facility] [id number] [phone] -- --                      LENORE Koch

## 2022-11-23 NOTE — PLAN OF CARE
Problem: Adult Inpatient Plan of Care  Goal: Plan of Care Review  11/23/2022 1533 by Bethany Schofield, RN  Outcome: Ongoing, Progressing  Flowsheets (Taken 11/23/2022 1533)  Outcome Evaluation: Pt sitting up in chair at this time. Pt worked with PT this shift and tolerated well. No complaints of pain. VSS. Will continue with plan of care.

## 2022-11-24 LAB
QT INTERVAL: 474 MS
QTC INTERVAL: 533 MS
TROPONIN T SERPL-MCNC: 0.01 NG/ML (ref 0–0.03)

## 2022-11-24 PROCEDURE — 84484 ASSAY OF TROPONIN QUANT: CPT | Performed by: INTERNAL MEDICINE

## 2022-11-24 PROCEDURE — 93005 ELECTROCARDIOGRAM TRACING: CPT | Performed by: INTERNAL MEDICINE

## 2022-11-24 RX ADMIN — ESCITALOPRAM 10 MG: 10 TABLET, FILM COATED ORAL at 14:14

## 2022-11-24 RX ADMIN — HYDROCODONE BITARTRATE AND ACETAMINOPHEN 1 TABLET: 7.5; 325 TABLET ORAL at 03:50

## 2022-11-24 RX ADMIN — PANTOPRAZOLE SODIUM 40 MG: 40 TABLET, DELAYED RELEASE ORAL at 05:43

## 2022-11-24 RX ADMIN — GABAPENTIN 300 MG: 300 CAPSULE ORAL at 20:10

## 2022-11-24 RX ADMIN — METOPROLOL TARTRATE 25 MG: 25 TABLET, FILM COATED ORAL at 20:09

## 2022-11-24 RX ADMIN — TAMSULOSIN HYDROCHLORIDE 0.4 MG: 0.4 CAPSULE ORAL at 14:14

## 2022-11-24 RX ADMIN — Medication 10 ML: at 20:10

## 2022-11-24 RX ADMIN — HYDRALAZINE HYDROCHLORIDE 5 MG: 25 TABLET, FILM COATED ORAL at 20:09

## 2022-11-24 RX ADMIN — Medication 10 MG: at 20:09

## 2022-11-24 RX ADMIN — APIXABAN 5 MG: 5 TABLET, FILM COATED ORAL at 20:09

## 2022-11-24 RX ADMIN — HYDRALAZINE HYDROCHLORIDE 5 MG: 25 TABLET, FILM COATED ORAL at 05:43

## 2022-11-24 RX ADMIN — FUROSEMIDE 40 MG: 40 TABLET ORAL at 18:22

## 2022-11-24 RX ADMIN — FUROSEMIDE 40 MG: 40 TABLET ORAL at 14:14

## 2022-11-24 RX ADMIN — APIXABAN 5 MG: 5 TABLET, FILM COATED ORAL at 14:14

## 2022-11-24 RX ADMIN — Medication 1 TABLET: at 14:14

## 2022-11-24 NOTE — PLAN OF CARE
Goal Outcome Evaluation:  Plan of Care Reviewed With: patient           Outcome Evaluation: Pt resting in chair at this time. Pt ambulated to chair with assistance and has been in chair most of day. No complaints at this time. Will continue with plan of care.

## 2022-11-24 NOTE — PLAN OF CARE
"Goal Outcome Evaluation:  Plan of Care Reviewed With: patient        Progress: no change  Outcome Evaluation: Pt resting in bed. Vital signs stable. Pt stated, \"she wants to go home so bad\". No acute changes. Will continue plan of care.  "

## 2022-11-24 NOTE — PROGRESS NOTES
Pt admitted to swing bed on 10/5 for additional therapy in the setting of generalized weakness/debility. Pt not seen or examined today. I have reviewed her VS. I have discussed her care with SONAM Sims who reports an episode of CP overnight. EKG revealed paced rhythm and troponin was negative. No further episodes of CP reported. BP stable today. Cont current antihypertensive regimen. Cont Eliquis for treatment of DVT. Cont PT/OT. Pt currently approved for swing bed through 11/28 with plans for further clinical review at that time. Repeat labs in the AM.       Mustapha Loredo DO  11/24/22  15:52 EDT

## 2022-11-25 LAB
ALBUMIN SERPL-MCNC: 2.67 G/DL (ref 3.5–5.2)
ALBUMIN/GLOB SERPL: 0.9 G/DL
ALP SERPL-CCNC: 43 U/L (ref 39–117)
ALT SERPL W P-5'-P-CCNC: 5 U/L (ref 1–33)
ANION GAP SERPL CALCULATED.3IONS-SCNC: 11.8 MMOL/L (ref 5–15)
AST SERPL-CCNC: 9 U/L (ref 1–32)
BASOPHILS # BLD AUTO: 0.05 10*3/MM3 (ref 0–0.2)
BASOPHILS NFR BLD AUTO: 0.8 % (ref 0–1.5)
BILIRUB SERPL-MCNC: 0.2 MG/DL (ref 0–1.2)
BUN SERPL-MCNC: 34 MG/DL (ref 8–23)
BUN/CREAT SERPL: 18.9 (ref 7–25)
CALCIUM SPEC-SCNC: 9.1 MG/DL (ref 8.6–10.5)
CHLORIDE SERPL-SCNC: 99 MMOL/L (ref 98–107)
CO2 SERPL-SCNC: 26.2 MMOL/L (ref 22–29)
CREAT SERPL-MCNC: 1.8 MG/DL (ref 0.57–1)
DEPRECATED RDW RBC AUTO: 50.2 FL (ref 37–54)
EGFRCR SERPLBLD CKD-EPI 2021: 26.7 ML/MIN/1.73
EOSINOPHIL # BLD AUTO: 0.42 10*3/MM3 (ref 0–0.4)
EOSINOPHIL NFR BLD AUTO: 7 % (ref 0.3–6.2)
ERYTHROCYTE [DISTWIDTH] IN BLOOD BY AUTOMATED COUNT: 14.4 % (ref 12.3–15.4)
GLOBULIN UR ELPH-MCNC: 2.8 GM/DL
GLUCOSE SERPL-MCNC: 103 MG/DL (ref 65–99)
HCT VFR BLD AUTO: 31.1 % (ref 34–46.6)
HGB BLD-MCNC: 9.5 G/DL (ref 12–15.9)
IMM GRANULOCYTES # BLD AUTO: 0.02 10*3/MM3 (ref 0–0.05)
IMM GRANULOCYTES NFR BLD AUTO: 0.3 % (ref 0–0.5)
LYMPHOCYTES # BLD AUTO: 1.31 10*3/MM3 (ref 0.7–3.1)
LYMPHOCYTES NFR BLD AUTO: 21.8 % (ref 19.6–45.3)
MCH RBC QN AUTO: 29.6 PG (ref 26.6–33)
MCHC RBC AUTO-ENTMCNC: 30.5 G/DL (ref 31.5–35.7)
MCV RBC AUTO: 96.9 FL (ref 79–97)
MONOCYTES # BLD AUTO: 0.56 10*3/MM3 (ref 0.1–0.9)
MONOCYTES NFR BLD AUTO: 9.3 % (ref 5–12)
NEUTROPHILS NFR BLD AUTO: 3.64 10*3/MM3 (ref 1.7–7)
NEUTROPHILS NFR BLD AUTO: 60.8 % (ref 42.7–76)
NRBC BLD AUTO-RTO: 0 /100 WBC (ref 0–0.2)
PLATELET # BLD AUTO: 203 10*3/MM3 (ref 140–450)
PMV BLD AUTO: 9.1 FL (ref 6–12)
POTASSIUM SERPL-SCNC: 4.1 MMOL/L (ref 3.5–5.2)
PROT SERPL-MCNC: 5.5 G/DL (ref 6–8.5)
RBC # BLD AUTO: 3.21 10*6/MM3 (ref 3.77–5.28)
SODIUM SERPL-SCNC: 137 MMOL/L (ref 136–145)
WBC NRBC COR # BLD: 6 10*3/MM3 (ref 3.4–10.8)

## 2022-11-25 PROCEDURE — 97110 THERAPEUTIC EXERCISES: CPT

## 2022-11-25 PROCEDURE — 97530 THERAPEUTIC ACTIVITIES: CPT

## 2022-11-25 PROCEDURE — 80053 COMPREHEN METABOLIC PANEL: CPT | Performed by: INTERNAL MEDICINE

## 2022-11-25 PROCEDURE — 85025 COMPLETE CBC W/AUTO DIFF WBC: CPT | Performed by: INTERNAL MEDICINE

## 2022-11-25 RX ADMIN — METOPROLOL TARTRATE 25 MG: 25 TABLET, FILM COATED ORAL at 21:29

## 2022-11-25 RX ADMIN — APIXABAN 5 MG: 5 TABLET, FILM COATED ORAL at 08:03

## 2022-11-25 RX ADMIN — Medication 1 TABLET: at 08:03

## 2022-11-25 RX ADMIN — HYDRALAZINE HYDROCHLORIDE 5 MG: 25 TABLET, FILM COATED ORAL at 21:29

## 2022-11-25 RX ADMIN — DOCUSATE SODIUM 100 MG: 100 CAPSULE ORAL at 16:46

## 2022-11-25 RX ADMIN — Medication 10 MG: at 21:29

## 2022-11-25 RX ADMIN — Medication 10 ML: at 08:03

## 2022-11-25 RX ADMIN — ESCITALOPRAM 10 MG: 10 TABLET, FILM COATED ORAL at 08:03

## 2022-11-25 RX ADMIN — FUROSEMIDE 40 MG: 40 TABLET ORAL at 08:03

## 2022-11-25 RX ADMIN — Medication 10 ML: at 21:31

## 2022-11-25 RX ADMIN — TAMSULOSIN HYDROCHLORIDE 0.4 MG: 0.4 CAPSULE ORAL at 08:03

## 2022-11-25 RX ADMIN — FUROSEMIDE 40 MG: 40 TABLET ORAL at 16:46

## 2022-11-25 RX ADMIN — APIXABAN 5 MG: 5 TABLET, FILM COATED ORAL at 21:29

## 2022-11-25 RX ADMIN — METOPROLOL TARTRATE 25 MG: 25 TABLET, FILM COATED ORAL at 08:03

## 2022-11-25 RX ADMIN — PANTOPRAZOLE SODIUM 40 MG: 40 TABLET, DELAYED RELEASE ORAL at 06:44

## 2022-11-25 RX ADMIN — HYDRALAZINE HYDROCHLORIDE 5 MG: 25 TABLET, FILM COATED ORAL at 06:44

## 2022-11-25 RX ADMIN — HYDRALAZINE HYDROCHLORIDE 5 MG: 25 TABLET, FILM COATED ORAL at 13:15

## 2022-11-25 RX ADMIN — GABAPENTIN 300 MG: 300 CAPSULE ORAL at 21:29

## 2022-11-25 RX ADMIN — Medication 10 ML: at 21:32

## 2022-11-25 NOTE — PROGRESS NOTES
Pt admitted to swing bed on 10/5 for additional therapy in the setting of generalized weakness/debility. Pt not seen or examined today. I have reviewed her VS and labs. Labs remain overall stable today. I have discussed her care with SONAM Kang who does not report any acute events or issues that need to be addressed. No further episodes of CP reported. Remaining clinically stable off antibiotics after treatment of UTI. BP stable today. Cont current antihypertensive regimen. Cont Eliquis for treatment of DVT. Cont PT/OT. Pt currently approved for swing bed through 11/28 with plans for further clinical review at that time.        Mustapha Loredo, DO  11/25/22  15:52 EDT

## 2022-11-25 NOTE — PLAN OF CARE
Goal Outcome Evaluation:              Outcome Evaluation: Pt is resting in bed at this time. Pt has sat in chair for most of the shift. No acute changes noted at this time. Will continue plan of care.

## 2022-11-25 NOTE — THERAPY TREATMENT NOTE
Acute Care - Physical Therapy Treatment Note   Scot     Patient Name: Tre Kinney  : 1932  MRN: [id number]  Today's Date: 2022   Onset of Illness/Injury or Date of Surgery: 10/06/22  Visit Dx:   No diagnosis found.  Patient Active Problem List   Diagnosis   • Acid reflux   • Ankle arthralgia   • Cardiac conduction disorder   • Closed fracture of distal fibula   • Arteriosclerosis of coronary artery   • Fracture of distal end of tibia   • Elevated cholesterol   • BP (high blood pressure)   • Hypertrophic polyarthritis   • OP (osteoporosis)   • Right heart failure (HCC)   • Heart failure, chronic, right-sided (HCC)   • SOB (shortness of breath)   • Palpitations   • CHF (congestive heart failure) (HCC)   • Congestive heart failure (HCC)   • Precordial pain   • Chronic anticoagulation   • Hypokalemia   • Anemia, unspecified type   • Generalized weakness     Past Medical History:   Diagnosis Date   • Anemia    • Arthritis    • CHF (congestive heart failure) (HCC)    • Chronic kidney disease    • Coronary artery disease    • H/O blood clots    • History of transfusion     no reaction    • Hypertension      Past Surgical History:   Procedure Laterality Date   • ABDOMINAL SURGERY     • APPENDECTOMY     • CARDIAC SURGERY     •  SECTION     • COLONOSCOPY N/A 3/7/2018    Procedure: COLONOSCOPY;  Surgeon: Willie Gresham MD;  Location:  [facility];  Service:    • CORONARY ANGIOPLASTY WITH STENT PLACEMENT     • ENDOSCOPY N/A 3/6/2018    Procedure: ESOPHAGOGASTRODUODENOSCOPY;  Surgeon: Willie Gresham MD;  Location:  [facility];  Service:    • PACEMAKER IMPLANTATION      left side    • SKIN BIOPSY     • TUBAL ABDOMINAL LIGATION       PT Assessment (last 12 hours)     PT Evaluation and Treatment     Row Name 22 [facility]          Physical Therapy Time and Intention    Document Type therapy note (daily note)  -     Mode of Treatment physical therapy  -     Patient Effort adequate  -      Comment Pt seen for treatment this AM, son Willian Facetimed and witnessed session. OT was available to assist with pt ambulation and standing. PT then worked with pt on LE TE with glute sets and LAQ.  -     Row Name 11/25/22 1100          Bed Mobility    Comment, (Bed Mobility) Pt seen sitting in bedside chair  -     Row Name 11/25/22 1100          Transfers    Transfers sit-stand transfer;stand-sit transfer  -     Row Name 11/25/22 1100          Sit-Stand Transfer    Sit-Stand [place] (Transfers) moderate assist (50% patient effort);maximum assist (25% patient effort);1 person assist;2 person assist  -     Assistive Device (Sit-Stand Transfers) walker, front-wheeled  -     Comment, (Sit-Stand Transfer) 2 trials  -     Row Name 11/25/22 1100          Stand-Sit Transfer    Stand-Sit [place] (Transfers) moderate assist (50% patient effort);maximum assist (25% patient effort);1 person assist;2 person assist  -     Assistive Device (Stand-Sit Transfers) walker, front-wheeled  -     Row Name 11/25/22 1100          Gait/Stairs (Locomotion)    Gait/Stairs Locomotion gait/ambulation assistive device  -     [place] Level (Gait) moderate assist (50% patient effort);maximum assist (25% patient effort);2 person assist  -     Assistive Device (Gait) walker, front-wheeled  -     Distance in Feet (Gait) Approximately 1' from chair total  -     Comment, (Gait/Stairs) LE squatting with posterior leaning  -     Row Name 11/25/22 1100          Motor Skills    Therapeutic Exercise hip;knee  -     Row Name 11/25/22 1100          Hip (Therapeutic Exercise)    Hip (Therapeutic Exercise) isometric exercises  -     Hip Isometrics (Therapeutic Exercise) bilateral;gluteal sets  1-2second hold, 1x10, 2x15 approximately  -     Row Name 11/25/22 1100          Knee (Therapeutic Exercise)    Knee (Therapeutic Exercise) other (see comments)  LAQ with body weight 1x10 approximately, YTB 2x15 on R LE, YTB  1x10-15 L LE, 1x10-15 RTB  -KH     Row Name 11/25/22 1100          Positioning and Restraints    Pre-Treatment Position sitting in chair/recliner  -KH     Post Treatment Position chair  -KH     In Chair sitting;with OT  -KH           User Key  (r) = Recorded By, (t) = Taken By, (c) = Cosigned By    Initials Name Provider Type    Radha Yusuf, PT Physical Therapist                Physical Therapy Education     Title: PT OT SLP Therapies (Done)     Topic: Physical Therapy (Done)     Point: Mobility training (Done)     Learning Progress Summary           Patient Acceptance, E,TB, VU by RM at 11/20/2022 1449    Acceptance, E,TB, VU by RM at 11/18/2022 1021    Acceptance, E,TB, VU by RH at 11/16/2022 1110    Acceptance, E,TB, VU by KK at 11/14/2022 1638    Acceptance, E,TB, VU by KK at 11/13/2022 1648    Acceptance, E,TB, VU by KG at 11/10/2022 1827    Acceptance, E,TB, VU by DM at 11/9/2022 1638    Acceptance, E,TB, VU by KK at 11/8/2022 1754    Acceptance, E,TB, VU by KK at 11/7/2022 1635    Acceptance, E,D, VU,NR by LL at 11/5/2022 1325    Acceptance, E, VU by EB at 11/4/2022 1448    Acceptance, E,TB, VU by KM at 11/4/2022 1101                   Point: Home exercise program (Done)     Learning Progress Summary           Patient Acceptance, E,TB, VU by RM at 11/20/2022 1449    Acceptance, E,TB, VU by RM at 11/18/2022 1021    Acceptance, E,TB, VU by RH at 11/16/2022 1110    Acceptance, E,TB, VU by KK at 11/14/2022 1638    Acceptance, E,TB, VU by KK at 11/13/2022 1648    Acceptance, E,TB, VU by KG at 11/10/2022 1827    Acceptance, E,TB, VU by DM at 11/9/2022 1638    Acceptance, E,TB, VU by KK at 11/8/2022 1754    Acceptance, E,TB, VU by KK at 11/7/2022 1635    Acceptance, E,D, VU,NR by LL at 11/5/2022 1325    Acceptance, E, VU by EB at 11/4/2022 1448    Acceptance, E,TB, VU by KM at 11/4/2022 1101                   Point: Body mechanics (Done)     Learning Progress Summary           Patient Acceptance, E,TB, VU by  RM at 11/20/2022 1449    Acceptance, E,TB, VU by RM at 11/18/2022 1021    Acceptance, E,TB, VU by RH at 11/16/2022 1110    Acceptance, E,TB, VU by KK at 11/14/2022 1638    Acceptance, E,TB, VU by KK at 11/13/2022 1648    Acceptance, E,TB, VU by KG at 11/10/2022 1827    Acceptance, E,TB, VU by DM at 11/9/2022 1638    Acceptance, E,TB, VU by KK at 11/8/2022 1754    Acceptance, E,TB, VU by KK at 11/7/2022 1635    Acceptance, E,D, VU,NR by LL at 11/5/2022 1325    Acceptance, E, VU by EB at 11/4/2022 1448    Acceptance, E,TB, VU by KM at 11/4/2022 1101                   Point: Precautions (Done)     Learning Progress Summary           Patient Acceptance, E,TB, VU by RM at 11/20/2022 1449    Acceptance, E,TB, VU by RM at 11/18/2022 1021    Acceptance, E,TB, VU by RH at 11/16/2022 1110    Acceptance, E,TB, VU by KK at 11/14/2022 1638    Acceptance, E,TB, VU by KK at 11/13/2022 1648    Acceptance, E,TB, VU by KG at 11/10/2022 1827    Acceptance, E,TB, VU by DM at 11/9/2022 1638    Acceptance, E,TB, VU by KK at 11/8/2022 1754    Acceptance, E,TB, VU by KK at 11/7/2022 1635    Acceptance, E,D, VU,NR by LL at 11/5/2022 1325    Acceptance, E, VU by EB at 11/4/2022 1448    Acceptance, E,TB, VU by KM at 11/4/2022 1101                               User Key     Initials Effective Dates Name Provider Type Discipline    LL 05/02/16 -  Neda Garcia PTA Physical Therapist Assistant PT    RM 06/14/22 -  Bethany Krause RN Registered Nurse Nurse    COTY 06/16/21 -  Moniz, Deborah, RN Registered Nurse Nurse    KM 05/24/22 -  Mao Vázquez, PT Physical Therapist PT    EB 09/22/22 -  Cedric Oliver, RN Registered Nurse Nurse    KK 06/27/22 -  Chelsi Bolivar, RN Registered Nurse Nurse    KG 09/22/22 -  Verónica Rodriguez, RN Registered Nurse Nurse     08/03/22 -  Jung Russell, RN [facility] Registered Nurse Nurse              PT Recommendation and Plan     Outcome Evaluation: Pt was able to take steps this date with RW and approximately  modAx2. Pt appears in better spirits today than when this PT saw her last.   Outcome Measures     Row Name 11/25/22 1100 11/22/22 1357          How much help from another person do you currently need...    Turning from your back to your side while in flat bed without using bedrails? 4  -KH --     Moving from lying on back to sitting on the side of a flat bed without bedrails? 3  -KH --     Moving to and from a bed to a chair (including a wheelchair)? 2  -KH --     Standing up from a chair using your arms (e.g., wheelchair, bedside chair)? 2  -KH --     Climbing 3-5 steps with a railing? 1  -KH --     To walk in hospital room? 2  -KH --     AM-PAC 6 Clicks Score (PT) 14  -KH --        How much help from another is currently needed...    Putting on and taking off regular lower body clothing? -- 2  -KR     Bathing (including washing, rinsing, and drying) -- 2  -KR     Toileting (which includes using toilet bed pan or urinal) -- 2  -KR     Putting on and taking off regular upper body clothing -- 3  -KR     Taking care of personal grooming (such as brushing teeth) -- 3  -KR     Eating meals -- 4  -KR     AM-PAC 6 Clicks Score (OT) -- 16  -KR        Functional Assessment    Outcome Measure Options AM-PAC 6 Clicks Basic Mobility (PT)  - --           User Key  (r) = Recorded By, (t) = Taken By, (c) = Cosigned By    Initials Name Provider Type    Andres Ayala OT Occupational Therapist    Radha Yusuf PT Physical Therapist                 Time Calculation:    PT Charges     Row Name 11/25/22 1348             Time Calculation    Start Time 1100  -      Stop Time 1123  -      Time Calculation (min) 23 min  -KH      PT Received On 11/25/22  -            User Key  (r) = Recorded By, (t) = Taken By, (c) = Cosigned By    Initials Name Provider Type    Radha Yusuf PT Physical Therapist              Therapy Charges for Today     Code Description Service Date Service Provider Modifiers Qty    11423311378  HC PT THER PROC EA 15 MIN 11/25/2022 Radha Vu, PT GP 1    01787354124 HC PT THERAPEUTIC ACT EA 15 MIN 11/25/2022 Radha Vu, PT GP 1          PT G-Codes  Outcome Measure Options: AM-PAC 6 Clicks Basic Mobility (PT)  AM-PAC 6 Clicks Score (PT): 14  AM-PAC 6 Clicks Score (OT): 16    Radha Vu PT  11/25/2022

## 2022-11-25 NOTE — THERAPY TREATMENT NOTE
Acute Care - Occupational Therapy Treatment Note   [place]     Patient Name: Tre Kinney  : 1932  MRN: [id number]  Today's Date: 2022  Onset of Illness/Injury or Date of Surgery: 10/06/22     Referring Physician: Janneth    Admit Date: 10/5/2022     No diagnosis found.  Patient Active Problem List   Diagnosis   • Acid reflux   • Ankle arthralgia   • Cardiac conduction disorder   • Closed fracture of distal fibula   • Arteriosclerosis of coronary artery   • Fracture of distal end of tibia   • Elevated cholesterol   • BP (high blood pressure)   • Hypertrophic polyarthritis   • OP (osteoporosis)   • Right heart failure (HCC)   • Heart failure, chronic, right-sided (HCC)   • SOB (shortness of breath)   • Palpitations   • CHF (congestive heart failure) (HCC)   • Congestive heart failure (HCC)   • Precordial pain   • Chronic anticoagulation   • Hypokalemia   • Anemia, unspecified type   • Generalized weakness     Past Medical History:   Diagnosis Date   • Anemia    • Arthritis    • CHF (congestive heart failure) ([facility])    • Chronic kidney disease    • Coronary artery disease    • H/O blood clots    • History of transfusion     no reaction    • Hypertension      Past Surgical History:   Procedure Laterality Date   • ABDOMINAL SURGERY     • APPENDECTOMY     • CARDIAC SURGERY     •  SECTION     • COLONOSCOPY N/A 3/7/2018    Procedure: COLONOSCOPY;  Surgeon: Willie Gresham MD;  Location:  [facility];  Service:    • CORONARY ANGIOPLASTY WITH STENT PLACEMENT     • ENDOSCOPY N/A 3/6/2018    Procedure: ESOPHAGOGASTRODUODENOSCOPY;  Surgeon: Willie Gresham MD;  Location:  [facility];  Service:    • PACEMAKER IMPLANTATION      left side    • SKIN BIOPSY     • TUBAL ABDOMINAL LIGATION           OT ASSESSMENT FLOWSHEET (last 12 hours)     OT Evaluation and Treatment     Row Name 22 1347                   OT Time and Intention    Subjective Information complains of;weakness;fatigue  -LM         Document Type therapy note (daily note)  -LM        Mode of Treatment occupational therapy  -LM        Patient Effort adequate  -LM        Comment Patient seen this date for light bue therex/fxl activity tolerance from 11:25-11:40.  Patient performed light bue arom therex while seated in recliner, yellow tband.  Frequent rest breaks.  Assisted with PT for sit to stand prior to OT session.  -LM           General Information    Existing Precautions/Restrictions fall  -LM        Limitations/Impairments safety/cognitive  -LM           Cognition    Affect/Mental Status (Cognition) WFL  -LM           Positioning and Restraints    In Chair call light within reach;encouraged to call for assist  -LM              User Key  (r) = Recorded By, (t) = Taken By, (c) = Cosigned By    Initials Name Effective Dates    LM Татьяна Bah, OT 06/16/21 -                        OT Recommendation and Plan           Outcome Measures     Row Name 11/25/22 1300 11/25/22 1100          How much help from another person do you currently need...    Turning from your back to your side while in flat bed without using bedrails? -- 4  -KH     Moving from lying on back to sitting on the side of a flat bed without bedrails? -- 3  -KH     Moving to and from a bed to a chair (including a wheelchair)? -- 2  -KH     Standing up from a chair using your arms (e.g., wheelchair, bedside chair)? -- 2  -KH     Climbing 3-5 steps with a railing? -- 1  -KH     To walk in hospital room? -- 2  -KH     AM-PAC 6 Clicks Score (PT) -- 14  -KH        How much help from another is currently needed...    Putting on and taking off regular lower body clothing? 2  -LM --     Bathing (including washing, rinsing, and drying) 2  -LM --     Toileting (which includes using toilet bed pan or urinal) 2  -LM --     Putting on and taking off regular upper body clothing 3  -LM --     Taking care of personal grooming (such as brushing teeth) 3  -LM --     Eating meals 4  -LM --      AM-PAC 6 Clicks Score (OT) 16  -LM --        Functional Assessment    Outcome Measure Options AM-PAC 6 Clicks Daily Activity (OT)  -LM AM-PAC 6 Clicks Basic Mobility (PT)  -           User Key  (r) = Recorded By, (t) = Taken By, (c) = Cosigned By    Initials Name Provider Type    Татьяна Mccabe, OT Occupational Therapist     Radha Vu, PT Physical Therapist                Time Calculation:    Time Calculation- OT     Row Name 11/25/22 1359             Time Calculation- OT    Total Timed Code Minutes- OT 15 minute(s)  -            User Key  (r) = Recorded By, (t) = Taken By, (c) = Cosigned By    Initials Name Provider Type     Татьяна Bah, OT Occupational Therapist              Therapy Charges for Today     Code Description Service Date Service Provider Modifiers Qty    [id number]  OT THER PROC EA 15 MIN 11/25/2022 Татьяна Bah OT GO 1               Татьяна Bah OT  11/25/2022

## 2022-11-25 NOTE — CASE MANAGEMENT/SOCIAL WORK
Discharge Planning Assessment  CODY Ellison     Patient Name: Tre Kinney  MRN: [id number]  Today's Date: 11/25/2022    Admit Date: 10/5/2022    Plan: Pt was admitted to swing bed on 10/5/22. Pt's insurance has approved swing bed admission through 11/28/22. Swing bed RN will submit updated clinicals to pt's insurance on 11/28/22 to request additional swing bed days. SS to follow and assist with discharge planning.     Discharge Plan     Row Name 11/25/22 1240       Plan    Plan Pt was admitted to swing bed on 10/5/22. Pt's insurance has approved swing bed admission through 11/28/22. Swing bed RN will submit updated clinicals to pt's insurance on 11/28/22 to request additional swing bed days. SS to follow and assist with discharge planning.                XANDER Craig

## 2022-11-25 NOTE — PLAN OF CARE
Goal Outcome Evaluation:  Plan of Care Reviewed With: patient        Progress: no change  Outcome Evaluation: pt up in chair, then assisted back to bed. resting quietly most of this shift. no acute changes noted. will continue with poc

## 2022-11-26 PROCEDURE — 97530 THERAPEUTIC ACTIVITIES: CPT

## 2022-11-26 PROCEDURE — 97110 THERAPEUTIC EXERCISES: CPT

## 2022-11-26 PROCEDURE — 97116 GAIT TRAINING THERAPY: CPT

## 2022-11-26 RX ORDER — ACETAMINOPHEN 325 MG/1
650 TABLET ORAL EVERY 6 HOURS PRN
Status: DISCONTINUED | OUTPATIENT
Start: 2022-11-26 | End: 2023-01-13 | Stop reason: HOSPADM

## 2022-11-26 RX ADMIN — FUROSEMIDE 40 MG: 40 TABLET ORAL at 15:28

## 2022-11-26 RX ADMIN — GABAPENTIN 300 MG: 300 CAPSULE ORAL at 21:26

## 2022-11-26 RX ADMIN — HYDRALAZINE HYDROCHLORIDE 5 MG: 25 TABLET, FILM COATED ORAL at 21:26

## 2022-11-26 RX ADMIN — TAMSULOSIN HYDROCHLORIDE 0.4 MG: 0.4 CAPSULE ORAL at 09:07

## 2022-11-26 RX ADMIN — PANTOPRAZOLE SODIUM 40 MG: 40 TABLET, DELAYED RELEASE ORAL at 05:19

## 2022-11-26 RX ADMIN — FUROSEMIDE 40 MG: 40 TABLET ORAL at 09:06

## 2022-11-26 RX ADMIN — Medication 1 TABLET: at 09:07

## 2022-11-26 RX ADMIN — ACETAMINOPHEN 650 MG: 325 TABLET, FILM COATED ORAL at 09:06

## 2022-11-26 RX ADMIN — METOPROLOL TARTRATE 25 MG: 25 TABLET, FILM COATED ORAL at 21:27

## 2022-11-26 RX ADMIN — HYDRALAZINE HYDROCHLORIDE 5 MG: 25 TABLET, FILM COATED ORAL at 15:28

## 2022-11-26 RX ADMIN — APIXABAN 5 MG: 5 TABLET, FILM COATED ORAL at 21:26

## 2022-11-26 RX ADMIN — Medication 10 ML: at 21:27

## 2022-11-26 RX ADMIN — HYDRALAZINE HYDROCHLORIDE 5 MG: 25 TABLET, FILM COATED ORAL at 05:19

## 2022-11-26 RX ADMIN — APIXABAN 5 MG: 5 TABLET, FILM COATED ORAL at 09:07

## 2022-11-26 RX ADMIN — Medication 10 MG: at 21:26

## 2022-11-26 RX ADMIN — Medication 10 ML: at 09:07

## 2022-11-26 RX ADMIN — METOPROLOL TARTRATE 25 MG: 25 TABLET, FILM COATED ORAL at 09:07

## 2022-11-26 RX ADMIN — DOCUSATE SODIUM 100 MG: 100 CAPSULE ORAL at 09:09

## 2022-11-26 RX ADMIN — ESCITALOPRAM 10 MG: 10 TABLET, FILM COATED ORAL at 09:06

## 2022-11-26 NOTE — THERAPY TREATMENT NOTE
Acute Care - Physical Therapy Treatment Note   [place]     Patient Name: Tre Kinney  : 1932  MRN: [id number]  Today's Date: 2022   Onset of Illness/Injury or Date of Surgery: 10/06/22  Visit Dx:   No diagnosis found.  Patient Active Problem List   Diagnosis   • Acid reflux   • Ankle arthralgia   • Cardiac conduction disorder   • Closed fracture of distal fibula   • Arteriosclerosis of coronary artery   • Fracture of distal end of tibia   • Elevated cholesterol   • BP (high blood pressure)   • Hypertrophic polyarthritis   • OP (osteoporosis)   • Right heart failure (HCC)   • Heart failure, chronic, right-sided (HCC)   • SOB (shortness of breath)   • Palpitations   • CHF (congestive heart failure) (HCC)   • Congestive heart failure (HCC)   • Precordial pain   • Chronic anticoagulation   • Hypokalemia   • Anemia, unspecified type   • Generalized weakness     Past Medical History:   Diagnosis Date   • Anemia    • Arthritis    • CHF (congestive heart failure) (HCC)    • Chronic kidney disease    • Coronary artery disease    • H/O blood clots    • History of transfusion     no reaction    • Hypertension      Past Surgical History:   Procedure Laterality Date   • ABDOMINAL SURGERY     • APPENDECTOMY     • CARDIAC SURGERY     •  SECTION     • COLONOSCOPY N/A 3/7/2018    Procedure: COLONOSCOPY;  Surgeon: Willie Gresham MD;  Location:  [facility];  Service:    • CORONARY ANGIOPLASTY WITH STENT PLACEMENT     • ENDOSCOPY N/A 3/6/2018    Procedure: ESOPHAGOGASTRODUODENOSCOPY;  Surgeon: Willie Gresham MD;  Location:  [facility];  Service:    • PACEMAKER IMPLANTATION      left side    • SKIN BIOPSY     • TUBAL ABDOMINAL LIGATION       PT Assessment (last 12 hours)     PT Evaluation and Treatment     Row Name 22 1403          Physical Therapy Time and Intention    Subjective Information complains of;weakness;fatigue  -CT     Document Type therapy note (daily note)  -CT     Mode of  Treatment physical therapy  -CT     Patient Effort adequate  -CT     Comment Pts treated this PM with padmaja Dorsey via facetime. PT ambulated x 3 attempts this date and performed seated ther ex.  -CT     Row Name 11/26/22 1403          Bed Mobility    Comment, (Bed Mobility) deferred pt out of bed in chair pre/post therapy  -CT     Row Name 11/26/22 1403          Transfers    Transfers sit-stand transfer;stand-sit transfer  -CT     Row Name 11/26/22 1403          Sit-Stand Transfer    Sit-Stand [place] (Transfers) moderate assist (50% patient effort);2 person assist  -CT     Assistive Device (Sit-Stand Transfers) walker, front-wheeled  -CT     Row Name 11/26/22 1403          Stand-Sit Transfer    Stand-Sit [place] (Transfers) moderate assist (50% patient effort);2 person assist  -CT     Assistive Device (Stand-Sit Transfers) walker, front-wheeled  -CT     Row Name 11/26/22 1403          Gait/Stairs (Locomotion)    Gait/Stairs Locomotion gait/ambulation assistive device  -CT     [place] Level (Gait) moderate assist (50% patient effort);2 person assist  -CT     Assistive Device (Gait) walker, front-wheeled  -CT     Distance in Feet (Gait) 7 ft x 3  -CT     Pattern (Gait) step-to  -CT     Deviations/Abnormal Patterns (Gait) stride length decreased;gait speed decreased;festinating/shuffling;weight shifting decreased  -CT     Bilateral Gait Deviations weight shift ability decreased;forward flexed posture;knee buckling bilaterally  -CT     Row Name 11/26/22 1403          Motor Skills    Therapeutic Exercise --  BLE seated ther ex: marches, LAQ, AP, seated marches, glut sets, hip abd/add  -CT     Row Name 11/26/22 1403          Positioning and Restraints    Pre-Treatment Position sitting in chair/recliner  -CT     Post Treatment Position chair  -CT     In Chair sitting;call light within reach;encouraged to call for assist;notified nsg  -CT           User Key  (r) = Recorded By, (t) = Taken By, (c) = Cosigned By     Initials Name Provider Type    CT Bhargavi Vásquez, EDEL Physical Therapist                Physical Therapy Education     Title: PT OT SLP Therapies (Done)     Topic: Physical Therapy (Done)     Point: Mobility training (Done)     Learning Progress Summary           Patient Acceptance, E,TB, VU by RM at 11/20/2022 1449    Acceptance, E,TB, VU by RM at 11/18/2022 1021    Acceptance, E,TB, VU by RH at 11/16/2022 1110    Acceptance, E,TB, VU by KK at 11/14/2022 1638    Acceptance, E,TB, VU by KK at 11/13/2022 1648    Acceptance, E,TB, VU by KG at 11/10/2022 1827    Acceptance, E,TB, VU by DM at 11/9/2022 1638    Acceptance, E,TB, VU by KK at 11/8/2022 1754    Acceptance, E,TB, VU by KK at 11/7/2022 1635    Acceptance, E,D, VU,NR by LL at 11/5/2022 1325    Acceptance, E, VU by EB at 11/4/2022 1448    Acceptance, E,TB, VU by KM at 11/4/2022 1101                   Point: Home exercise program (Done)     Learning Progress Summary           Patient Acceptance, E,TB, VU by RM at 11/20/2022 1449    Acceptance, E,TB, VU by RM at 11/18/2022 1021    Acceptance, E,TB, VU by RH at 11/16/2022 1110    Acceptance, E,TB, VU by KK at 11/14/2022 1638    Acceptance, E,TB, VU by KK at 11/13/2022 1648    Acceptance, E,TB, VU by KG at 11/10/2022 1827    Acceptance, E,TB, VU by DM at 11/9/2022 1638    Acceptance, E,TB, VU by KK at 11/8/2022 1754    Acceptance, E,TB, VU by KK at 11/7/2022 1635    Acceptance, E,D, VU,NR by LL at 11/5/2022 1325    Acceptance, E, VU by EB at 11/4/2022 1448    Acceptance, E,TB, VU by KM at 11/4/2022 1101                   Point: Body mechanics (Done)     Learning Progress Summary           Patient Acceptance, E,TB, VU by RM at 11/20/2022 1449    Acceptance, E,TB, VU by RM at 11/18/2022 1021    Acceptance, E,TB, VU by RH at 11/16/2022 1110    Acceptance, E,TB, VU by KK at 11/14/2022 1638    Acceptance, E,TB, VU by KK at 11/13/2022 1648    Acceptance, E,TB, VU by KG at 11/10/2022 1827    Acceptance, E,TB, VU by DM  at 11/9/2022 1638    Acceptance, E,TB, VU by KK at 11/8/2022 1754    Acceptance, E,TB, VU by KK at 11/7/2022 1635    Acceptance, E,D, VU,NR by LL at 11/5/2022 1325    Acceptance, E, VU by EB at 11/4/2022 1448    Acceptance, E,TB, VU by KM at 11/4/2022 1101                   Point: Precautions (Done)     Learning Progress Summary           Patient Acceptance, E,TB, VU by RM at 11/20/2022 1449    Acceptance, E,TB, VU by RM at 11/18/2022 1021    Acceptance, E,TB, VU by RH at 11/16/2022 1110    Acceptance, E,TB, VU by KK at 11/14/2022 1638    Acceptance, E,TB, VU by KK at 11/13/2022 1648    Acceptance, E,TB, VU by KG at 11/10/2022 1827    Acceptance, E,TB, VU by DM at 11/9/2022 1638    Acceptance, E,TB, VU by KK at 11/8/2022 1754    Acceptance, E,TB, VU by KK at 11/7/2022 1635    Acceptance, E,D, VU,NR by LL at 11/5/2022 1325    Acceptance, E, VU by EB at 11/4/2022 1448    Acceptance, E,TB, VU by KM at 11/4/2022 1101                               User Key     Initials Effective Dates Name Provider Type Discipline    LL 05/02/16 -  Neda Garcia, NACHO Physical Therapist Assistant PT    RM 06/14/22 -  Bethany Krause, RN Registered Nurse Nurse    DM 06/16/21 -  Moniz, Deborah, RN Registered Nurse Nurse    KM 05/24/22 -  Mao Vázquez PT Physical Therapist PT    EB 09/22/22 -  Cedrci Oliver, RN Registered Nurse Nurse    KK 06/27/22 -  Chelsi Bolivar, RN Registered Nurse Nurse    KG 09/22/22 -  Verónica Rodriguez, RN Registered Nurse Nurse    RH 08/03/22 -  Jung Russell, RN Extern Registered Nurse Nurse              PT Recommendation and Plan  Anticipated Discharge Disposition (PT): home with 24/7 care, skilled nursing facility  Planned Therapy Interventions (PT): balance training, bed mobility training, gait training, home exercise program, manual therapy techniques, motor coordination training, neuromuscular re-education, patient/family education, postural re-education, strengthening, transfer training  Therapy  Frequency (PT): 5 times/wk (5-6 times/wk)  Progress Summary (PT)  Progress Toward Functional Goals (PT): progress toward functional goals is gradual  Plan of Care Reviewed With: patient  Progress: no change   Outcome Measures     Row Name 11/26/22 1205 11/25/22 1300 11/25/22 1100       How much help from another person do you currently need...    Turning from your back to your side while in flat bed without using bedrails? -- -- 4  -KH    Moving from lying on back to sitting on the side of a flat bed without bedrails? -- -- 3  -KH    Moving to and from a bed to a chair (including a wheelchair)? -- -- 2  -KH    Standing up from a chair using your arms (e.g., wheelchair, bedside chair)? -- -- 2  -KH    Climbing 3-5 steps with a railing? -- -- 1  -KH    To walk in hospital room? -- -- 2  -KH    AM-PAC 6 Clicks Score (PT) -- -- 14  -KH       How much help from another is currently needed...    Putting on and taking off regular lower body clothing? 2  -KR 2  -LM --    Bathing (including washing, rinsing, and drying) 2  -KR 2  -LM --    Toileting (which includes using toilet bed pan or urinal) 2  -KR 2  -LM --    Putting on and taking off regular upper body clothing 3  -KR 3  -LM --    Taking care of personal grooming (such as brushing teeth) 3  -KR 3  -LM --    Eating meals 4  -KR 4  -LM --    AM-PAC 6 Clicks Score (OT) 16  -KR 16  -LM --       Functional Assessment    Outcome Measure Options -- AM-PAC 6 Clicks Daily Activity (OT)  -LM AM-PAC 6 Clicks Basic Mobility (PT)  -KH          User Key  (r) = Recorded By, (t) = Taken By, (c) = Cosigned By    Initials Name Provider Type    Andres Ayala, OT Occupational Therapist    LM Татьяна Bah, OT Occupational Therapist    Radha Yusuf, PT Physical Therapist                 Time Calculation:    PT Charges     Row Name 11/26/22 1410             Time Calculation    Start Time 1240  -CT      PT Received On 11/26/22  -CT         Time Calculation- PT    Total Timed  Code Minutes- PT 32 minute(s)  -CT            User Key  (r) = Recorded By, (t) = Taken By, (c) = Cosigned By    Initials Name Provider Type    CT Bhargavi Vásquez, PT Physical Therapist              Therapy Charges for Today     Code Description Service Date Service Provider Modifiers Qty    29125618489  GAIT TRAINING EA 15 MIN 11/26/2022 Bhargavi Vásquez, PT GP 2    [id number]  PT THER PROC EA 15 MIN 11/26/2022 Bhargavi Vásquez, PT GP 1          PT G-Codes  Outcome Measure Options: AM-PAC 6 Clicks Daily Activity (OT)  AM-PAC 6 Clicks Score (PT): 13  AM-PAC 6 Clicks Score (OT): 16    Bhargavi Vásquez PT  11/26/2022

## 2022-11-26 NOTE — PROGRESS NOTES
Pt admitted to swing bed on 10/5 for additional therapy in the setting of generalized weakness/debility. Pt not seen or examined today. I have reviewed her VS. I have discussed her care with SONAM Sims who does not report any acute events or issues that need to be addressed. PRN Tylenol ordered for mild pain. No further episodes of CP reported. Remaining clinically stable off antibiotics after treatment of UTI. BP stable today. Cont current antihypertensive regimen. Cont Eliquis for treatment of DVT. Cont PT/OT. Pt currently approved for swing bed through 11/28 with plans for further clinical review at that time.        Mustapha Loredo, DO  11/26/22  15:52 EDT

## 2022-11-26 NOTE — THERAPY TREATMENT NOTE
Acute Care - Occupational Therapy Treatment Note   [place]     Patient Name: Tre Kinney  : 1932  MRN: [id number]  Today's Date: 2022  Onset of Illness/Injury or Date of Surgery: 10/06/22     Referring Physician: Janneth    Admit Date: 10/5/2022     No diagnosis found.  Patient Active Problem List   Diagnosis   • Acid reflux   • Ankle arthralgia   • Cardiac conduction disorder   • Closed fracture of distal fibula   • Arteriosclerosis of coronary artery   • Fracture of distal end of tibia   • Elevated cholesterol   • BP (high blood pressure)   • Hypertrophic polyarthritis   • OP (osteoporosis)   • Right heart failure (HCC)   • Heart failure, chronic, right-sided (HCC)   • SOB (shortness of breath)   • Palpitations   • CHF (congestive heart failure) (HCC)   • Congestive heart failure (HCC)   • Precordial pain   • Chronic anticoagulation   • Hypokalemia   • Anemia, unspecified type   • Generalized weakness     Past Medical History:   Diagnosis Date   • Anemia    • Arthritis    • CHF (congestive heart failure) ([facility])    • Chronic kidney disease    • Coronary artery disease    • H/O blood clots    • History of transfusion     no reaction    • Hypertension      Past Surgical History:   Procedure Laterality Date   • ABDOMINAL SURGERY     • APPENDECTOMY     • CARDIAC SURGERY     •  SECTION     • COLONOSCOPY N/A 3/7/2018    Procedure: COLONOSCOPY;  Surgeon: Willie Gresham MD;  Location:  [facility];  Service:    • CORONARY ANGIOPLASTY WITH STENT PLACEMENT     • ENDOSCOPY N/A 3/6/2018    Procedure: ESOPHAGOGASTRODUODENOSCOPY;  Surgeon: Willie Gresham MD;  Location:  [facility];  Service:    • PACEMAKER IMPLANTATION      left side    • SKIN BIOPSY     • TUBAL ABDOMINAL LIGATION           OT ASSESSMENT FLOWSHEET (last 12 hours)     OT Evaluation and Treatment     Row Name 22 1200                   OT Time and Intention    Document Type therapy note (daily note)  -KR        Mode of  Treatment occupational therapy  -KR        Patient Effort good  -KR        Comment Pt presented seated in chair at bedside for OT session today. Pt cooperative and motivated to perform ther ex. Facetime achieved with sonWillian. Family and pt agreed to forgo facetime communication at this time and resume call when PT performs mobility activity. OT contacted PT and informed to contact family upon PT session initiation. PT acknowledged and agreed. BUE ther ex performed from seated in chair. Trunk strengthening, sitting balance and general endurance activity performed adequately. OT to continue as tolerated.  -KR           General Information    General Observations of Patient alert/cooperative  -KR           Shoulder (Therapeutic Exercise)    Shoulder AROM (Therapeutic Exercise) bilateral;flexion;extension;aBduction;aDduction;sitting;10 repetitions;3 sets  -KR           Elbow/Forearm (Therapeutic Exercise)    Elbow/Forearm (Therapeutic Exercise) AROM (active range of motion)  -KR        Elbow/Forearm AROM (Therapeutic Exercise) bilateral;flexion;extension;sitting;10 repetitions;3 sets  -KR           Hand (Therapeutic Exercise)    Hand (Therapeutic Exercise) strengthening exercise  -KR        Hand Strengthening (Therapeutic Exercise)  strengthening;hand gripper;bilateral;finger flexion;finger extension  -KR           Balance    Dynamic Sitting Balance standby assist  -KR              User Key  (r) = Recorded By, (t) = Taken By, (c) = Cosigned By    Initials Name Effective Dates    KR Andres Uriarte, OT 06/16/21 -                        OT Recommendation and Plan     Progress Toward Functional Goals (OT): progress toward functional goals is gradual  Plan of Care Review  Plan of Care Reviewed With: patient  Progress: improving  Plan of Care Reviewed With: patient     Outcome Measures     Row Name 11/26/22 1205 11/25/22 1300 11/25/22 1100       How much help from another person do you currently need...    Turning from  your back to your side while in flat bed without using bedrails? -- -- 4  -KH    Moving from lying on back to sitting on the side of a flat bed without bedrails? -- -- 3  -KH    Moving to and from a bed to a chair (including a wheelchair)? -- -- 2  -KH    Standing up from a chair using your arms (e.g., wheelchair, bedside chair)? -- -- 2  -KH    Climbing 3-5 steps with a railing? -- -- 1  -KH    To walk in hospital room? -- -- 2  -KH    AM-PAC 6 Clicks Score (PT) -- -- 14  -KH       How much help from another is currently needed...    Putting on and taking off regular lower body clothing? 2  -KR 2  -LM --    Bathing (including washing, rinsing, and drying) 2  -KR 2  -LM --    Toileting (which includes using toilet bed pan or urinal) 2  -KR 2  -LM --    Putting on and taking off regular upper body clothing 3  -KR 3  -LM --    Taking care of personal grooming (such as brushing teeth) 3  -KR 3  -LM --    Eating meals 4  -KR 4  -LM --    AM-PAC 6 Clicks Score (OT) 16  -KR 16  -LM --       Functional Assessment    Outcome Measure Options -- AM-PAC 6 Clicks Daily Activity (OT)  -LM AM-PAC 6 Clicks Basic Mobility (PT)  -          User Key  (r) = Recorded By, (t) = Taken By, (c) = Cosigned By    Initials Name Provider Type    Andres Ayala, OT Occupational Therapist    LM Татьяна Bah, OT Occupational Therapist    Radha Yusuf, PT Physical Therapist                Time Calculation:    Time Calculation- OT     Row Name 11/26/22 1205             Time Calculation- OT    Total Timed Code Minutes- OT 15 minute(s)  -KARLO            User Key  (r) = Recorded By, (t) = Taken By, (c) = Cosigned By    Initials Name Provider Type    Andres Ayala OT Occupational Therapist              Therapy Charges for Today     Code Description Service Date Service Provider Modifiers Qty    [id number]  OT THERAPEUTIC ACT EA 15 MIN 11/26/2022 Andres Uriarte OT GO 1               Andres Uriarte OT  11/26/2022

## 2022-11-26 NOTE — PLAN OF CARE
Goal Outcome Evaluation:              Outcome Evaluation: Patient has rested in bed, no complaints or request at this time, VSS, Will continue plan of care.

## 2022-11-27 RX ADMIN — APIXABAN 5 MG: 5 TABLET, FILM COATED ORAL at 08:18

## 2022-11-27 RX ADMIN — Medication 10 MG: at 20:09

## 2022-11-27 RX ADMIN — PANTOPRAZOLE SODIUM 40 MG: 40 TABLET, DELAYED RELEASE ORAL at 05:32

## 2022-11-27 RX ADMIN — HYDRALAZINE HYDROCHLORIDE 5 MG: 25 TABLET, FILM COATED ORAL at 22:49

## 2022-11-27 RX ADMIN — METOPROLOL TARTRATE 25 MG: 25 TABLET, FILM COATED ORAL at 20:09

## 2022-11-27 RX ADMIN — Medication 10 ML: at 20:10

## 2022-11-27 RX ADMIN — TAMSULOSIN HYDROCHLORIDE 0.4 MG: 0.4 CAPSULE ORAL at 08:19

## 2022-11-27 RX ADMIN — FUROSEMIDE 40 MG: 40 TABLET ORAL at 08:19

## 2022-11-27 RX ADMIN — HYDRALAZINE HYDROCHLORIDE 5 MG: 25 TABLET, FILM COATED ORAL at 14:59

## 2022-11-27 RX ADMIN — METOPROLOL TARTRATE 25 MG: 25 TABLET, FILM COATED ORAL at 08:19

## 2022-11-27 RX ADMIN — ESCITALOPRAM 10 MG: 10 TABLET, FILM COATED ORAL at 08:19

## 2022-11-27 RX ADMIN — FUROSEMIDE 40 MG: 40 TABLET ORAL at 18:20

## 2022-11-27 RX ADMIN — Medication 10 ML: at 08:19

## 2022-11-27 RX ADMIN — HYDRALAZINE HYDROCHLORIDE 5 MG: 25 TABLET, FILM COATED ORAL at 05:32

## 2022-11-27 RX ADMIN — Medication 1 TABLET: at 08:19

## 2022-11-27 RX ADMIN — GABAPENTIN 300 MG: 300 CAPSULE ORAL at 20:09

## 2022-11-27 RX ADMIN — APIXABAN 5 MG: 5 TABLET, FILM COATED ORAL at 20:09

## 2022-11-27 NOTE — PROGRESS NOTES
Pt admitted to swing bed on 10/5 for additional therapy in the setting of generalized weakness/debility. Pt not seen or examined today. I have reviewed her VS. I have discussed her care with SONAM Nicholson who does not report any acute events or issues that need to be addressed. No further episodes of CP reported since overnight on 11/24 . Remaining clinically stable off antibiotics after treatment of UTI. BP stable today. Cont current antihypertensive regimen. Cont Eliquis for treatment of DVT. Cont PT/OT. Pt currently approved for swing bed through 11/28 with plans for further clinical review at that time.        Mustapha Loredo, DO  11/27/22  15:52 EDT

## 2022-11-27 NOTE — PLAN OF CARE
Goal Outcome Evaluation:              Outcome Evaluation: patient has rested well tonight with no complaints. patient states she wants to be able to walk again so she can go home. no distress noted.

## 2022-11-28 PROCEDURE — 97110 THERAPEUTIC EXERCISES: CPT

## 2022-11-28 PROCEDURE — 99308 SBSQ NF CARE LOW MDM 20: CPT | Performed by: INTERNAL MEDICINE

## 2022-11-28 PROCEDURE — 97164 PT RE-EVAL EST PLAN CARE: CPT

## 2022-11-28 PROCEDURE — 97116 GAIT TRAINING THERAPY: CPT

## 2022-11-28 RX ADMIN — APIXABAN 5 MG: 5 TABLET, FILM COATED ORAL at 07:46

## 2022-11-28 RX ADMIN — APIXABAN 5 MG: 5 TABLET, FILM COATED ORAL at 21:22

## 2022-11-28 RX ADMIN — Medication 10 MG: at 21:22

## 2022-11-28 RX ADMIN — HYDRALAZINE HYDROCHLORIDE 5 MG: 25 TABLET, FILM COATED ORAL at 05:49

## 2022-11-28 RX ADMIN — PANTOPRAZOLE SODIUM 40 MG: 40 TABLET, DELAYED RELEASE ORAL at 05:49

## 2022-11-28 RX ADMIN — Medication 10 ML: at 21:23

## 2022-11-28 RX ADMIN — GABAPENTIN 300 MG: 300 CAPSULE ORAL at 21:23

## 2022-11-28 RX ADMIN — FUROSEMIDE 40 MG: 40 TABLET ORAL at 16:40

## 2022-11-28 RX ADMIN — TAMSULOSIN HYDROCHLORIDE 0.4 MG: 0.4 CAPSULE ORAL at 07:46

## 2022-11-28 RX ADMIN — ESCITALOPRAM 10 MG: 10 TABLET, FILM COATED ORAL at 07:46

## 2022-11-28 RX ADMIN — HYDRALAZINE HYDROCHLORIDE 5 MG: 25 TABLET, FILM COATED ORAL at 21:23

## 2022-11-28 RX ADMIN — FUROSEMIDE 40 MG: 40 TABLET ORAL at 07:46

## 2022-11-28 RX ADMIN — METOPROLOL TARTRATE 25 MG: 25 TABLET, FILM COATED ORAL at 07:46

## 2022-11-28 RX ADMIN — METOPROLOL TARTRATE 25 MG: 25 TABLET, FILM COATED ORAL at 21:22

## 2022-11-28 RX ADMIN — Medication 1 TABLET: at 07:46

## 2022-11-28 NOTE — CASE MANAGEMENT/SOCIAL WORK
Patient has been approved for 7 additional days in swing bed with clinical updates due on 12/3/22 provider and SS notified via secure chat.   100

## 2022-11-28 NOTE — PLAN OF CARE
Goal Outcome Evaluation:              Outcome Evaluation: patient has rested well tonight with periods of frustration due to sitting in the chair for a long time yesterday. patient states she overworked herself yesterday. no distress noted.

## 2022-11-28 NOTE — CASE MANAGEMENT/SOCIAL WORK
Discharge Planning Assessment  [facility]     Patient Name: Tre Kinney  MRN: [id number]  Today's Date: 11/28/2022    Admit Date: 10/5/2022     Discharge Plan     Row Name 11/28/22 1226       Plan    Plan Pt was admitted to swing bed on 10/5/22. Pt's insurance has approved swing bed admission through 12/3/22 on this date per swing bed RN. Pt plans to return home after her insurance stops paying for swing bed program. SS to follow.              Continued Care and Services - Admitted Since 10/5/2022     Destination     Service Provider Request Status Selected Services Address Phone Fax Patient Preferred    [facility] Accepted N/A [address] [place] [address] [phone] --    [facility] Pending - Request Sent N/A [address] [place] [address] [phone] --    [facility] Declined  Does not have a secondary payor source N/A [address] [facility] [phone] --            Selected Continued Care - Prior Encounters Includes continued care and service providers with selected services from prior encounters from 7/7/2022 to 11/28/2022    Discharged on 10/5/2022 Admission date: 9/19/2022 - Discharge disposition: Swing Bed    Destination     Service Provider Selected Services Address Phone Fax Patient Preferred    [facility] [address] [facility] [place] [address] [phone] -- --                    LENORE Koch

## 2022-11-28 NOTE — THERAPY RE-EVALUATION
Acute Care - Physical Therapy Re-Evaluation   [place]     Patient Name: Tre Kinney  : 1932  MRN: [id number]  Today's Date: 2022   Onset of Illness/Injury or Date of Surgery: 10/06/22  Visit Dx:   No diagnosis found.  Patient Active Problem List   Diagnosis   • Acid reflux   • Ankle arthralgia   • Cardiac conduction disorder   • Closed fracture of distal fibula   • Arteriosclerosis of coronary artery   • Fracture of distal end of tibia   • Elevated cholesterol   • BP (high blood pressure)   • Hypertrophic polyarthritis   • OP (osteoporosis)   • Right heart failure (HCC)   • Heart failure, chronic, right-sided (HCC)   • SOB (shortness of breath)   • Palpitations   • CHF (congestive heart failure) (HCC)   • Congestive heart failure (HCC)   • Precordial pain   • Chronic anticoagulation   • Hypokalemia   • Anemia, unspecified type   • Generalized weakness     Past Medical History:   Diagnosis Date   • Anemia    • Arthritis    • CHF (congestive heart failure) (HCC)    • Chronic kidney disease    • Coronary artery disease    • H/O blood clots    • History of transfusion     no reaction    • Hypertension      Past Surgical History:   Procedure Laterality Date   • ABDOMINAL SURGERY     • APPENDECTOMY     • CARDIAC SURGERY     •  SECTION     • COLONOSCOPY N/A 3/7/2018    Procedure: COLONOSCOPY;  Surgeon: Willie Gresham MD;  Location:  [facility];  Service:    • CORONARY ANGIOPLASTY WITH STENT PLACEMENT     • ENDOSCOPY N/A 3/6/2018    Procedure: ESOPHAGOGASTRODUODENOSCOPY;  Surgeon: Willie Gresham MD;  Location:  [facility];  Service:    • PACEMAKER IMPLANTATION      left side    • SKIN BIOPSY     • TUBAL ABDOMINAL LIGATION       PT Assessment (last 12 hours)     PT Evaluation and Treatment     Row Name 22 1250          Physical Therapy Time and Intention    Subjective Information no complaints  -CT     Document Type re-evaluation;therapy note (daily note)  -CT     Mode of Treatment  physical therapy  -CT     Patient Effort adequate  -CT     Comment Pt treatment session [id number]. Pelon Dorsey via facetime. Ot present for assist with ambulation. Pt ambulated x 3 attempts and performed seated ther ex.  -CT     Row Name 11/28/22 1250          General Information    Patient Profile Reviewed yes  -CT     Limitations/Impairments safety/cognitive  -CT     Row Name 11/28/22 1250          Cognition    Affect/Mental Status (Cognition) WFL  -CT     Row Name 11/28/22 1250          Range of Motion Comprehensive    Comment, General Range of Motion BLE grossly WFL  -CT     Row Name 11/28/22 1250          Strength Comprehensive (MMT)    Comment, General Manual Muscle Testing (MMT) Assessment BLE grossly 3+ to 4-/5  -CT     Row Name 11/28/22 1250          Bed Mobility    Comment, (Bed Mobility) pt up in chair pre/post therapy  -CT     Row Name 11/28/22 1250          Transfers    Transfers sit-stand transfer;stand-sit transfer  -CT     Row Name 11/28/22 1250          Sit-Stand Transfer    Sit-Stand [place] (Transfers) minimum assist (75% patient effort)  Mod A on 3rd attempt  -CT     Assistive Device (Sit-Stand Transfers) walker, front-wheeled  -CT     Row Name 11/28/22 1250          Stand-Sit Transfer    Stand-Sit [place] (Transfers) minimum assist (75% patient effort)  -CT     Assistive Device (Stand-Sit Transfers) walker, front-wheeled  -CT     Row Name 11/28/22 1250          Gait/Stairs (Locomotion)    Gait/Stairs Locomotion gait/ambulation assistive device  -CT     [place] Level (Gait) moderate assist (50% patient effort);2 person assist  -CT     Assistive Device (Gait) walker, front-wheeled  -CT     Distance in Feet (Gait) 4, 6, 3  3 trials  -CT     Pattern (Gait) step-to  -CT     Deviations/Abnormal Patterns (Gait) stride length decreased;gait speed decreased;festinating/shuffling;weight shifting decreased  -CT     Bilateral Gait Deviations weight shift ability decreased;forward flexed  posture;knee buckling bilaterally  -CT     Row Name 11/28/22 1250          Balance    Static Standing Balance moderate assist  -CT     Row Name 11/28/22 1250          Motor Skills    Therapeutic Exercise --  BLE ther ex; seated marches, LAQ, AP, hip abd/add, glut sets 25 reps  -CT     Row Name 11/28/22 1250          Coping    Observed Emotional State pleasant;cooperative  -CT     Verbalized Emotional State acceptance  -CT     Trust Relationship/Rapport care explained  -CT     Family/Support Persons family  -CT     Row Name 11/28/22 1250          Plan of Care Review    Plan of Care Reviewed With patient  -CT     Progress improving  -CT     Row Name 11/28/22 1250          Positioning and Restraints    Pre-Treatment Position sitting in chair/recliner  -CT     Post Treatment Position chair  -CT     In Chair sitting;call light within reach;encouraged to call for assist  -CT     Row Name 11/28/22 1250          Therapy Plan Review/Discharge Plan (PT)    Therapy Plan Review (PT) evaluation/treatment results reviewed;care plan/treatment goals reviewed  -CT     Row Name 11/28/22 1250          Bed Mobility Goal 1 (PT)    Activity/Assistive Device (Bed Mobility Goal 1, PT) bed mobility activities, all  -CT     [place] Level/Cues Needed (Bed Mobility Goal 1, PT) minimum assist (75% or more patient effort)  -CT     Time Frame (Bed Mobility Goal 1, PT) by discharge  -CT     Progress/Outcomes (Bed Mobility Goal 1, PT) goal met  -CT     Row Name 11/28/22 1250          Transfer Goal 1 (PT)    Activity/Assistive Device (Transfer Goal 1, PT) sit-to-stand/stand-to-sit;bed-to-chair/chair-to-bed  -CT     [place] Level/Cues Needed (Transfer Goal 1, PT) minimum assist (75% or more patient effort)  -CT     Time Frame (Transfer Goal 1, PT) by discharge  -CT     Progress/Outcome (Transfer Goal 1, PT) goal partially met  -CT     Row Name 11/28/22 1250          Gait Training Goal 1 (PT)    Activity/Assistive Device (Gait Training Goal  1, PT) gait (walking locomotion);assistive device use  -CT     [place] Level (Gait Training Goal 1, PT) minimum assist (75% or more patient effort)  -CT     Distance (Gait Training Goal 1, PT) 30  -CT     Time Frame (Gait Training Goal 1, PT) by discharge  -CT     Progress/Outcome (Gait Training Goal 1, PT) goal not met  -CT           User Key  (r) = Recorded By, (t) = Taken By, (c) = Cosigned By    Initials Name Provider Type    CT Bhargavi Vásquez PT Physical Therapist                Physical Therapy Education     Title: PT OT SLP Therapies (Done)     Topic: Physical Therapy (Done)     Point: Mobility training (Done)     Learning Progress Summary           Patient Acceptance, E,TB, VU by BC at 11/27/2022 1319    Acceptance, E,TB, VU by RM at 11/20/2022 1449    Acceptance, E,TB, VU by RM at 11/18/2022 1021    Acceptance, E,TB, VU by RH at 11/16/2022 1110    Acceptance, E,TB, VU by KK at 11/14/2022 1638    Acceptance, E,TB, VU by KK at 11/13/2022 1648    Acceptance, E,TB, VU by KG at 11/10/2022 1827    Acceptance, E,TB, VU by DM at 11/9/2022 1638    Acceptance, E,TB, VU by KK at 11/8/2022 1754    Acceptance, E,TB, VU by KK at 11/7/2022 1635    Acceptance, E,D, VU,NR by LL at 11/5/2022 1325    Acceptance, E, VU by EB at 11/4/2022 1448    Acceptance, E,TB, VU by KM at 11/4/2022 1101                   Point: Home exercise program (Done)     Learning Progress Summary           Patient Acceptance, E,TB, VU by BC at 11/27/2022 1319    Acceptance, E,TB, VU by RM at 11/20/2022 1449    Acceptance, E,TB, VU by RM at 11/18/2022 1021    Acceptance, E,TB, VU by RH at 11/16/2022 1110    Acceptance, E,TB, VU by KK at 11/14/2022 1638    Acceptance, E,TB, VU by KK at 11/13/2022 1648    Acceptance, E,TB, VU by KG at 11/10/2022 1827    Acceptance, E,TB, VU by DM at 11/9/2022 1638    Acceptance, E,TB, VU by KK at 11/8/2022 1754    Acceptance, E,TB, VU by KK at 11/7/2022 1635    Acceptance, E,D, VU,NR by LL at 11/5/2022 1325     Acceptance, E, VU by EB at 11/4/2022 1448    Acceptance, E,TB, VU by KM at 11/4/2022 1101                   Point: Body mechanics (Done)     Learning Progress Summary           Patient Acceptance, E,TB, VU by BC at 11/27/2022 1319    Acceptance, E,TB, VU by RM at 11/20/2022 1449    Acceptance, E,TB, VU by RM at 11/18/2022 1021    Acceptance, E,TB, VU by RH at 11/16/2022 1110    Acceptance, E,TB, VU by KK at 11/14/2022 1638    Acceptance, E,TB, VU by KK at 11/13/2022 1648    Acceptance, E,TB, VU by KG at 11/10/2022 1827    Acceptance, E,TB, VU by DM at 11/9/2022 1638    Acceptance, E,TB, VU by KK at 11/8/2022 1754    Acceptance, E,TB, VU by KK at 11/7/2022 1635    Acceptance, E,D, VU,NR by LL at 11/5/2022 1325    Acceptance, E, VU by EB at 11/4/2022 1448    Acceptance, E,TB, VU by KM at 11/4/2022 1101                   Point: Precautions (Done)     Learning Progress Summary           Patient Acceptance, E,TB, VU by BC at 11/27/2022 1319    Acceptance, E,TB, VU by RM at 11/20/2022 1449    Acceptance, E,TB, VU by RM at 11/18/2022 1021    Acceptance, E,TB, VU by RH at 11/16/2022 1110    Acceptance, E,TB, VU by KK at 11/14/2022 1638    Acceptance, E,TB, VU by KK at 11/13/2022 1648    Acceptance, E,TB, VU by KG at 11/10/2022 1827    Acceptance, E,TB, VU by DM at 11/9/2022 1638    Acceptance, E,TB, VU by KK at 11/8/2022 1754    Acceptance, E,TB, VU by KK at 11/7/2022 1635    Acceptance, E,D, VU,NR by LL at 11/5/2022 1325    Acceptance, E, VU by EB at 11/4/2022 1448    Acceptance, E,TB, VU by KM at 11/4/2022 1101                               User Key     Initials Effective Dates Name Provider Type Discipline    LL 05/02/16 -  Neda Garcia, NACHO Physical Therapist Assistant PT    RM 06/14/22 -  Bethany Krause, RN Registered Nurse Nurse    DM 06/16/21 -  Moniz, Deborah, RN Registered Nurse Nurse    BC 08/02/22 -  Collett, Brittany, RN Registered Nurse Nurse     05/24/22 -  Mao Vázquez, EDEL Physical Therapist PT    EB  09/22/22 -  Cedric Oliver, RN Registered Nurse Nurse    KK 06/27/22 -  Chelsi Bolivar, RN Registered Nurse Nurse    KG 09/22/22 -  Verónica Rodriguez, RN Registered Nurse Nurse    RH 08/03/22 -  Jung Russell, RN Extern Registered Nurse Nurse              PT Recommendation and Plan  Anticipated Discharge Disposition (PT): home with /7 [facility], skilled nursing facility  Planned Therapy Interventions (PT): balance training, bed mobility training, gait training, home exercise program, manual therapy techniques, motor coordination training, neuromuscular re-education, patient/family education, postural re-education, strengthening, transfer training  Therapy Frequency (PT): 5 times/wk (5-6 times/wk)  Progress Summary (PT)  Progress Toward Functional Goals (PT): progress toward functional goals is gradual  Plan of Care Reviewed With: patient  Progress: improving   Outcome Measures     Row Name 11/28/22 1401 11/26/22 1205          How much help from another is currently needed...    Putting on and taking off regular lower body clothing? 2  -KP 2  -KR     Bathing (including washing, rinsing, and drying) -- 2  -KR     Toileting (which includes using toilet bed pan or urinal) 2  -KP 2  -KR     Putting on and taking off regular upper body clothing 3  -KP 3  -KR     Taking care of personal grooming (such as brushing teeth) 3  -KP 3  -KR     Eating meals 4  -KP 4  -KR     AM-PAC 6 Clicks Score (OT) -- 16  -KR           User Key  (r) = Recorded By, (t) = Taken By, (c) = Cosigned By    Initials Name Provider Type    Andres Ayala OT Occupational Therapist    Linn Richardson OT Occupational Therapist                 Time Calculation:     Therapy Charges for Today     Code Description Service Date Service Provider Modifiers Qty    14072934945 HC GAIT TRAINING EA 15 MIN 11/28/2022 Bhargavi Vásquez, PT GP 1    49047722302 HC PT THER PROC EA 15 MIN 11/28/2022 Bhargavi Vásquez, PT GP 1    43929145303 HC PT RE-EVAL ESTABLISHED PLAN 2  11/28/2022 Bhargavi Vásquez, PT GP 1          PT G-Codes  Outcome Measure Options: AM-PAC 6 Clicks Daily Activity (OT)  AM-PAC 6 Clicks Score (PT): 13  AM-PAC 6 Clicks Score (OT): 16    Bhargavi Vásquez, PT  11/28/2022

## 2022-11-28 NOTE — THERAPY TREATMENT NOTE
Acute Care - Occupational Therapy Treatment Note   [place]     Patient Name: Tre Kinney  : 1932  MRN: [id number]  Today's Date: 2022  Onset of Illness/Injury or Date of Surgery: 10/06/22     Referring Physician: Janneth    Admit Date: 10/5/2022     No diagnosis found.  Patient Active Problem List   Diagnosis   • Acid reflux   • Ankle arthralgia   • Cardiac conduction disorder   • Closed fracture of distal fibula   • Arteriosclerosis of coronary artery   • Fracture of distal end of tibia   • Elevated cholesterol   • BP (high blood pressure)   • Hypertrophic polyarthritis   • OP (osteoporosis)   • Right heart failure (HCC)   • Heart failure, chronic, right-sided (HCC)   • SOB (shortness of breath)   • Palpitations   • CHF (congestive heart failure) (HCC)   • Congestive heart failure (HCC)   • Precordial pain   • Chronic anticoagulation   • Hypokalemia   • Anemia, unspecified type   • Generalized weakness     Past Medical History:   Diagnosis Date   • Anemia    • Arthritis    • CHF (congestive heart failure) ([facility])    • Chronic kidney disease    • Coronary artery disease    • H/O blood clots    • History of transfusion     no reaction    • Hypertension      Past Surgical History:   Procedure Laterality Date   • ABDOMINAL SURGERY     • APPENDECTOMY     • CARDIAC SURGERY     •  SECTION     • COLONOSCOPY N/A 3/7/2018    Procedure: COLONOSCOPY;  Surgeon: Willie Gresham MD;  Location:  [facility];  Service:    • CORONARY ANGIOPLASTY WITH STENT PLACEMENT     • ENDOSCOPY N/A 3/6/2018    Procedure: ESOPHAGOGASTRODUODENOSCOPY;  Surgeon: Willie Gresham MD;  Location:  [facility];  Service:    • PACEMAKER IMPLANTATION      left side    • SKIN BIOPSY     • TUBAL ABDOMINAL LIGATION           OT ASSESSMENT FLOWSHEET (last 12 hours)     OT Evaluation and Treatment     Row Name 22 1354                   OT Time and Intention    Subjective Information no complaints  -KP        Document Type  therapy note (daily note)  -KP        Mode of Treatment individual therapy;occupational therapy  -KP        Total Minutes, Occupational Therapy 40  -KP        Patient Effort adequate  -KP        Symptoms Noted During/After Treatment none  -KP           General Information    Patient Profile Reviewed yes  -KP        General Observations of Patient Patient agreeable to therapy. She was seen  from 1250 to 1330 with son on Facetime throughout. Seen with PT to assist with functional mobility as needed for safety.  -        Existing Precautions/Restrictions fall  -KP        Limitations/Impairments safety/cognitive  -KP           Pain Assessment    Pretreatment Pain Rating 0/10 - no pain  -KP        Posttreatment Pain Rating 0/10 - no pain  -KP           Cognition    Affect/Mental Status (Cognition) WFL  -        Orientation Status (Cognition) oriented x 4  -KP           Sit-Stand Transfer    Sit-Stand [place] (Transfers) minimum assist (75% patient effort)  -        Assistive Device (Sit-Stand Transfers) walker, front-wheeled  -KP           Stand-Sit Transfer    Stand-Sit [place] (Transfers) minimum assist (75% patient effort)  -        Assistive Device (Stand-Sit Transfers) walker, front-wheeled  -           Motor Skills    Functional Endurance fair plus  -KP        Therapeutic Exercise other (see comments)  BUE exercise X20 reps X2 sets with yellow resistance theraband through functional movement patterns with rest breaks as needed  -           Coping    Observed Emotional State pleasant;cooperative;happy  -        Verbalized Emotional State acceptance  -KP        Trust Relationship/Rapport care explained  -KP        Family/Support Persons family  -           Plan of Care Review    Plan of Care Reviewed With patient  -KP        Progress improving  -KP           Positioning and Restraints    Pre-Treatment Position sitting in chair/recliner  -KP        Post Treatment Position chair  -KP        In  Chair sitting;call light within reach;with family/caregiver  -           Progress Summary (OT)    Progress Toward Functional Goals (OT) progress toward functional goals is gradual  -              User Key  (r) = Recorded By, (t) = Taken By, (c) = Cosigned By    Initials Name Effective Dates    Linn Richardson, OT 06/16/21 -                        OT Recommendation and Plan     Progress Toward Functional Goals (OT): progress toward functional goals is gradual  Plan of Care Review  Plan of Care Reviewed With: patient  Progress: improving  Outcome Evaluation: Patient seen for OT treatment session (9:06 to 9:45). She declined calling son due to patient seen for self care tasks. She performed ADLs from recliner as documented. She is able to perform upper body tasks with set-up, but requires maximal assist with ADLs requiring standing. Continue plan of care.  Plan of Care Reviewed With: patient  Outcome Evaluation: Patient seen for OT treatment session (9:06 to 9:45). She declined calling son due to patient seen for self care tasks. She performed ADLs from recliner as documented. She is able to perform upper body tasks with set-up, but requires maximal assist with ADLs requiring standing. Continue plan of care.     Outcome Measures     Row Name 11/28/22 1401 11/26/22 1205          How much help from another is currently needed...    Putting on and taking off regular lower body clothing? 2  -KP 2  -KR     Bathing (including washing, rinsing, and drying) -- 2  -KR     Toileting (which includes using toilet bed pan or urinal) 2  -KP 2  -KR     Putting on and taking off regular upper body clothing 3  -KP 3  -KR     Taking care of personal grooming (such as brushing teeth) 3  -KP 3  -KR     Eating meals 4  -KP 4  -KR     AM-PAC 6 Clicks Score (OT) -- 16  -KR           User Key  (r) = Recorded By, (t) = Taken By, (c) = Cosigned By    Initials Name Provider Type    Andres Ayala, OT Occupational Therapist    REGINA Quevedo  VELASQUEZ Esteves Occupational Therapist                Time Calculation:    Time Calculation- OT     Row Name 11/28/22 1404             Time Calculation- OT    OT Start Time 1250  -      OT Stop Time 1330  -      OT Time Calculation (min) 40 min  -      OT Received On 11/28/22  -            User Key  (r) = Recorded By, (t) = Taken By, (c) = Cosigned By    Initials Name Provider Type     Linn Quevedo OT Occupational Therapist              Therapy Charges for Today     Code Description Service Date Service Provider Modifiers Qty    38867161350 HC OT THER PROC EA 15 MIN 11/28/2022 Linn Quevedo OT GO 1               Linn Quevedo OT  11/28/2022

## 2022-11-28 NOTE — PAYOR COMM NOTE
"Michelet Jacques RN  Swing Bed Nurse  [phone] Ex [phone] FAX: [phone]  [email or web address]  [facility]  NPI [id number]  Reference Number [id number]      Patient continues with PT/OT strength and mobility training       Tre Kinney (89 y.o. Female)    YOB: 1932  Social Security Number:   Address: [address] [place] [address]  Home Phone: [phone]  MRN: [id number]  Adventism: [employer]  Marital Status:         Admission Date: 10/5/22  Admission Type: Urgent  Admitting Provider: Jannet Lagunas MD  Attending Provider: Jannet Lagunas MD  Department, Room/Bed: [address], 3336/  Discharge Date:   Discharge Disposition:   Discharge Destination:               Attending Provider: Jannet Lagunas MD    Allergies: Adhesive Tape, Xanax [Alprazolam]    Isolation: None   Infection: None   Code Status: No CPR    Ht: 170.2 cm (67.01\")   Wt: 76.7 kg (169 lb 3.2 oz)    Admission Cmt: None   Principal Problem: None                Active Insurance as of 10/5/2022     Primary Coverage     Payor Plan Insurance Group Employer/Plan Group    ANTH MEDICARE REPLACEMENT ANTHEM MEDICARE ADVANTAGE [id number]     Payor Plan Address Payor Plan Phone Number Payor Plan Fax Number Effective Dates    [address] [phone]  1/1/2022 - None Entered    [facility] [id number]       Subscriber Name Subscriber Birth Date Member ID       TRE KINNEY 12/9/1932 [id number]                 Emergency Contacts      (Rel.) Home Phone Work Phone Mobile Phone    Valdez Kinney (Son) [phone] None None    Leno Kinney (Son) [phone] None [phone]    Willian Kinney (Son) None None [phone]              Current Facility-Administered Medications   Medication Dose Route Frequency Provider Last Rate Last Admin   • acetaminophen (TYLENOL) tablet 650 mg  650 mg Oral Q6H PRN Mustapha Loredo DO   650 mg at 11/26/22 " 0906   • apixaban (ELIQUIS) tablet 5 mg  5 mg Oral Q12H Mustapha Loredo DO   5 mg at 11/27/22 2009   • benzocaine-menthol (DERMOPLAST) 20-0.5 % topical spray   Topical 4x Daily PRN Kennedi Saucedo MD   Given at 11/03/22 0548   • docusate sodium (COLACE) capsule 100 mg  100 mg Oral BID PRN Kennedi Saucedo MD   100 mg at 11/26/22 0909   • escitalopram (LEXAPRO) tablet 10 mg  10 mg Oral Daily Jannet Lagunas MD   10 mg at 11/27/22 0819   • furosemide (LASIX) tablet 40 mg  40 mg Oral BID Jannet Lagunas MD   40 mg at 11/27/22 1820   • gabapentin (NEURONTIN) capsule 300 mg  300 mg Oral Nightly Jannet Lagunas MD   300 mg at 11/27/22 2009   • guaiFENesin-dextromethorphan (ROBITUSSIN DM) 100-10 MG/5ML syrup 5 mL  5 mL Oral Q4H PRN Kennedi Saucedo MD       • hydrALAZINE (APRESOLINE) injection 10 mg  10 mg Intravenous Q6H PRN Mustapha Loredo DO       • hydrALAZINE (APRESOLINE) tablet 5 mg  5 mg Oral Q8H Jannet Lagunas MD   5 mg at 11/28/22 0549   • HYDROcodone-acetaminophen (NORCO) 7.5-325 MG per tablet 1 tablet  1 tablet Oral Q8H PRN Jannet Lagunas MD   1 tablet at 11/24/22 0350   • Magnesium Sulfate 2 gram Bolus, followed by 8 gram infusion (total Mg dose 10 grams)- Mg less than or equal to 1mg/dL  2 g Intravenous PRN Jannet Laguans MD        Or   • Magnesium Sulfate 2 gram / 50mL Infusion (GIVE X 3 BAGS TO EQUAL 6GM TOTAL DOSE) - Mg 1.1 - 1.5 mg/dl  2 g Intravenous PRN Jannet Lagunas MD        Or   • Magnesium Sulfate 4 gram infusion- Mg 1.6-1.9 mg/dL  4 g Intravenous PRN Jannet Lagunas MD       • melatonin tablet 10 mg  10 mg Oral Nightly Jannet Lagunas MD   10 mg at 11/27/22 2009   • metoprolol tartrate (LOPRESSOR) tablet 25 mg  25 mg Oral Q12H Dante Levine MD   25 mg at 11/27/22 2009   • multivitamin (THERAGRAN) tablet 1 tablet  1 tablet Oral Daily Jannet Lagunas MD   1 tablet at 11/27/22 0819   • ondansetron (ZOFRAN) injection 4 mg  4 mg  Intravenous Q6H PRN Jannet Lagunas MD       • pantoprazole (PROTONIX) EC tablet 40 mg  40 mg Oral Q AM Jannet Lagunas MD   40 mg at 11/28/22 0549   • Pharmacy Consult   Does not apply Continuous PRN Mustapha Loredo DO       • phenol (CHLORASEPTIC) 1.4 % liquid 1 spray  1 spray Mouth/Throat Q2H PRN Jannet Lagunas MD   1 spray at 10/13/22 2056   • polyethylene glycol (MIRALAX) packet 17 g  17 g Oral BID PRN Jannet Lagunas MD       • potassium chloride (K-DUR,KLOR-CON) CR tablet 40 mEq  40 mEq Oral PRN Jannet Lagunas MD        Or   • potassium chloride (KLOR-CON) packet 40 mEq  40 mEq Oral PRN Jannet Lagunas MD        Or   • potassium chloride 10 mEq in 100 mL IVPB  10 mEq Intravenous Q1H PRN Jannet Lagunas MD       • prochlorperazine (COMPAZINE) injection 5 mg  5 mg Intravenous Q6H PRN Jannet Lagunas MD        Or   • prochlorperazine (COMPAZINE) tablet 5 mg  5 mg Oral Q6H PRN Jannet Lagunas MD        Or   • prochlorperazine (COMPAZINE) suppository 25 mg  25 mg Rectal Q12H PRN Jannet Lagunas MD       • promethazine (PHENERGAN) 6.25 mg in sodium chloride 0.9 % 50 mL  6.25 mg Intravenous Q6H PRN Jannet Lagunas MD       • sodium chloride 0.9 % flush 10 mL  10 mL Intravenous PRN Jannet Lagunas MD       • sodium chloride 0.9 % flush 10 mL  10 mL Intravenous Q12H Jannet Lagunas MD   10 mL at 11/27/22 2010   • sodium chloride 0.9 % flush 10 mL  10 mL Intravenous PRN Jannet Lagunas MD       • sodium chloride 0.9 % flush 10 mL  10 mL Intravenous Q12H Jannet Lagunas MD   10 mL at 11/27/22 2010   • sodium chloride 0.9 % flush 10 mL  10 mL Intravenous PRN Jannet Lagunas MD       • tamsulosin (FLOMAX) 24 hr capsule 0.4 mg  0.4 mg Oral Daily Jannet Lagunas MD   0.4 mg at 11/27/22 0819        Physician Progress Notes (last 7 days)      Mustapha Loredo DO at 11/27/22 1438        Pt admitted to swing bed on 10/5 for additional therapy in the setting of  generalized weakness/debility. Pt not seen or examined today. I have reviewed her VS. I have discussed her care with SONAM Nicholson who does not report any acute events or issues that need to be addressed. No further episodes of CP reported since overnight on 11/24 . Remaining clinically stable off antibiotics after treatment of UTI. BP stable today. Cont current antihypertensive regimen. Cont Eliquis for treatment of DVT. Cont PT/OT. Pt currently approved for swing bed through 11/28 with plans for further clinical review at that time.        Mustapha Loredo DO  11/27/22  15:52 EDT                Electronically signed by Mustapha Loredo DO at 11/27/22 1440     Mustapha Loredo DO at 11/26/22 1258        Pt admitted to swing bed on 10/5 for additional therapy in the setting of generalized weakness/debility. Pt not seen or examined today. I have reviewed her VS. I have discussed her care with SONAM Sims who does not report any acute events or issues that need to be addressed. PRN Tylenol ordered for mild pain. No further episodes of CP reported. Remaining clinically stable off antibiotics after treatment of UTI. BP stable today. Cont current antihypertensive regimen. Cont Eliquis for treatment of DVT. Cont PT/OT. Pt currently approved for swing bed through 11/28 with plans for further clinical review at that time.        Mustapha Loredo DO  11/26/22  15:52 EDT                Electronically signed by Mustapha Loredo DO at 11/26/22 1259     Mustapha Loredo DO at 11/25/22 1319        Pt admitted to swing bed on 10/5 for additional therapy in the setting of generalized weakness/debility. Pt not seen or examined today. I have reviewed her VS and labs. Labs remain overall stable today. I have discussed her care with SONAM Kang who does not report any acute events or issues that need to be addressed. No further episodes of CP reported. Remaining clinically stable off antibiotics  after treatment of UTI. BP stable today. Cont current antihypertensive regimen. Cont Eliquis for treatment of DVT. Cont PT/OT. Pt currently approved for swing bed through 11/28 with plans for further clinical review at that time.        Mustapha Loredo DO  11/25/22  15:52 EDT                Electronically signed by Mustapha Loredo DO at 11/25/22 1322     Mustapha Loredo DO at 11/24/22 1557        Pt admitted to swing bed on 10/5 for additional therapy in the setting of generalized weakness/debility. Pt not seen or examined today. I have reviewed her VS. I have discussed her care with SONAM Sims who reports an episode of CP overnight. EKG revealed paced rhythm and troponin was negative. No further episodes of CP reported. BP stable today. Cont current antihypertensive regimen. Cont Eliquis for treatment of DVT. Cont PT/OT. Pt currently approved for swing bed through 11/28 with plans for further clinical review at that time. Repeat labs in the AM.       Mustapha Loredo DO  11/24/22  15:52 EDT                Electronically signed by Mustapha Loredo DO at 11/24/22 1559     Mustapha Loredo DO at 11/23/22 1355        Pt admitted to swing bed on 10/5 for additional therapy in the setting of generalized weakness/debility. Pt not seen or examined today. I have reviewed her VS. I have discussed her care with SONAM Sims who does not report any acute events or issues that need to be addressed. BP better controlled today. Cont current antihypertensive regimen. Cont Eliquis for treatment of DVT. Cont PT/OT. Pt currently approved for swing bed through 11/28 with plans for further clinical review at that time. SS/CM has had continued discussions with patient and family regarding disposition including SNF and possible private pay caregiver agencies at home as pt does not wish to pursue SNF placement at this time.       Mustapha Loredo DO  11/23/22  15:52  EDT                Electronically signed by Mustapha Loredo DO at 11/23/22 1357     Mustapha Loredo DO at 11/22/22 1627        Pt admitted to swing bed on 10/5 for additional therapy in the setting of generalized weakness/debility. Pt not seen or examined today. I have reviewed her VS. I have discussed her care with SONAM Kurtz who does not report any acute events or issues that need to be addressed. BP continues to fluctuate but will allow some relative HTN in the setting of her advanced age. Cont current antihypertensive regimen. Cont Eliquis for treatment of DVT. Cont PT/OT. Pt currently approved for swing bed through 11/28 with plans for further review at that time.       Mustapha Loredo DO  11/22/22  15:52 EDT                Electronically signed by Mustapha Loredo DO at 11/22/22 1629     Mustapha Loredo DO at 11/21/22 1755                [facility] HOSPITALIST PROGRESS NOTE    Subjective     History:   Tre Kinney is a 89 y.o. female admitted on 10/5/2022 secondary to <principal problem not specified>     Procedures: None    CC: Follow up debility    Patient seen and examined with SONAM Kurtz. Awake and alert. Sitting in bedside chair during my encounter. Reports some RLE pain/weakness when getting up to bedside chair today. No reported CP, dyspnea or palpitations. No reported nausea or vomiting.  No acute events overnight per RN.     History taken from: patient, chart, and RN.      Objective     Vital Signs  Temp:  [97.7 °F (36.5 °C)-98.1 °F (36.7 °C)] 97.7 °F (36.5 °C)  Heart Rate:  [77-82] 79  Resp:  [16-18] 16  BP: (109-162)/(58-78) 141/64    Intake/Output Summary (Last 24 hours) at 11/21/2022 1755  Last data filed at 11/21/2022 1629  Gross per 24 hour   Intake 960 ml   Output 2050 ml   Net -1090 ml         Physical Exam:  General:    Awake, alert, in no acute distress, chronically ill appearing   Heart:      Normal S1 and S2. Regular rate and rhythm. No  significant murmur, rubs or gallops appreciated.   Lungs:     Respirations regular, even and unlabored. Lungs clear to auscultation B/L. No wheezes, rales or rhonchi.   Abdomen:   Soft and nontender. No guarding, rebound tenderness or  organomegaly noted. Bowel sounds present x 4.   Extremities:  No lower extremity edema. Moves UE and LE equally B/L with generalized weakness.     Results Review:    Results from last 7 days   Lab Units 11/20/22  1218 11/17/22  0208   WBC 10*3/mm3 7.44 7.56   HEMOGLOBIN g/dL 10.7* 9.7*   PLATELETS 10*3/mm3 250 236     Results from last 7 days   Lab Units 11/20/22  1039 11/17/22  0208   SODIUM mmol/L 136 139   POTASSIUM mmol/L 4.1 3.8   CHLORIDE mmol/L 102 101   CO2 mmol/L 18.4* 26.9   BUN mg/dL 29* 25*   CREATININE mg/dL 1.94* 1.86*   CALCIUM mg/dL 8.7 8.4*   GLUCOSE mg/dL 120* 102*                       Imaging Results (Last 24 Hours)     ** No results found for the last 24 hours. **            Medications:  apixaban, 5 mg, Oral, Q12H  escitalopram, 10 mg, Oral, Daily  furosemide, 40 mg, Oral, BID  gabapentin, 300 mg, Oral, Nightly  hydrALAZINE, 5 mg, Oral, Q8H  melatonin, 10 mg, Oral, Nightly  metoprolol tartrate, 25 mg, Oral, Q12H  multivitamin, 1 tablet, Oral, Daily  pantoprazole, 40 mg, Oral, Q AM  sodium chloride, 10 mL, Intravenous, Q12H  sodium chloride, 10 mL, Intravenous, Q12H  tamsulosin, 0.4 mg, Oral, Daily      Pharmacy Consult,             Assessment & Plan   Generalized weakness/debility  Diffuse LLE DVT  DIANA on CKD IV  Acute blood loss anemia  Traumatic right gluteal hematoma  Mild hypokalemia   Paroxysmal Afib  Hx of SSS s/p pacemaker placement  Essential HTN  Reported hx of DVT's   Anxiety/depression  Advanced age    Plan  BP has fluctuated today but remains overall stable. Cont current regimen including hydralazine, metoprolol and lasix today. Remains weak but clinically stable. Tolerating Eliquis with no reported signs of bleeding. Labs from 11/20 reviewed  revealing stable H&H and renal function.  Discussed with patient today who states she plans to return home at the time of discharge. Currently approved for swing bed through . Cont PT/OT as tolerated.     Disposition: Patient currently approved for swing bed through  with further review at that time. Pt planning to return home at time of discharge.          Mustapha Loredo DO  22  17:55 EST    Electronically signed by Mustapha Loredo DO at 22 1800       Consult Notes (last 7 days)  Notes from 22 through 22   No notes of this type exist for this encounter.            Nutrition Notes (most recent note)      Magi Oneal RD at 22 1040        Nutrition Services    Patient Name:  Tre Kinney  YOB: 1932  MRN: [id number]  Admit Date:  10/5/2022    BMI: 26.02  Diet: Soft to chew, chopped meals  Supplement: Magic cup BID  Intake: 72% x 9 meals  Fluid: not meeting needs      Electronically signed by:  Magi Oneal RD  22 10:40 EST     Electronically signed by Magi Oneal RD at 22 1041          Physical Therapy Notes (most recent note)      Bhragavi Vásquez, PT at 22 1411  Version 1 of 1         Acute Care - Physical Therapy Treatment Note  C[facility] Ellison     Patient Name: Tre Kinney  : 1932  MRN: [id number]  Today's Date: 2022   Onset of Illness/Injury or Date of Surgery: 10/06/22  Visit Dx:   No diagnosis found.  Patient Active Problem List   Diagnosis   • Acid reflux   • Ankle arthralgia   • Cardiac conduction disorder   • Closed fracture of distal fibula   • Arteriosclerosis of coronary artery   • Fracture of distal end of tibia   • Elevated cholesterol   • BP (high blood pressure)   • Hypertrophic polyarthritis   • OP (osteoporosis)   • Right heart failure (HCC)   • Heart failure, chronic, right-sided (HCC)   • SOB (shortness of breath)   • Palpitations   • CHF (congestive heart failure) (HCC)   • Congestive  heart failure (HCC)   • Precordial pain   • Chronic anticoagulation   • Hypokalemia   • Anemia, unspecified type   • Generalized weakness     Past Medical History:   Diagnosis Date   • Anemia    • Arthritis    • CHF (congestive heart failure) (HCC)    • Chronic kidney disease    • Coronary artery disease    • H/O blood clots    • History of transfusion     no reaction    • Hypertension      Past Surgical History:   Procedure Laterality Date   • ABDOMINAL SURGERY     • APPENDECTOMY     • CARDIAC SURGERY     •  SECTION     • COLONOSCOPY N/A 3/7/2018    Procedure: COLONOSCOPY;  Surgeon: Willie Gresham MD;  Location:  [facility];  Service:    • CORONARY ANGIOPLASTY WITH STENT PLACEMENT     • ENDOSCOPY N/A 3/6/2018    Procedure: ESOPHAGOGASTRODUODENOSCOPY;  Surgeon: Willie Gresham MD;  Location:  [facility];  Service:    • PACEMAKER IMPLANTATION      left side    • SKIN BIOPSY     • TUBAL ABDOMINAL LIGATION       PT Assessment (last 12 hours)     PT Evaluation and Treatment     Row Name 22 1403          Physical Therapy Time and Intention    Subjective Information complains of;weakness;fatigue  -CT     Document Type therapy note (daily note)  -CT     Mode of Treatment physical therapy  -CT     Patient Effort adequate  -CT     Comment Pts treated this PM with padmaja Dorsey via [place]. PT ambulated x 3 attempts this date and performed seated ther ex.  -CT     Row Name 22 1403          Bed Mobility    Comment, (Bed Mobility) deferred pt out of bed in chair pre/post therapy  -CT     Row Name 22 1403          Transfers    Transfers sit-stand transfer;stand-sit transfer  -CT     Row Name 22 1403          Sit-Stand Transfer    Sit-Stand [place] (Transfers) moderate assist (50% patient effort);2 person assist  -CT     Assistive Device (Sit-Stand Transfers) walker, front-wheeled  -CT     Row Name 22 1403          Stand-Sit Transfer    Stand-Sit [place] (Transfers) moderate  assist (50% patient effort);2 person assist  -CT     Assistive Device (Stand-Sit Transfers) walker, front-wheeled  -CT     Row Name 11/26/22 1403          Gait/Stairs (Locomotion)    Gait/Stairs Locomotion gait/ambulation assistive device  -CT     [place] Level (Gait) moderate assist (50% patient effort);2 person assist  -CT     Assistive Device (Gait) walker, front-wheeled  -CT     Distance in Feet (Gait) 7 ft x 3  -CT     Pattern (Gait) step-to  -CT     Deviations/Abnormal Patterns (Gait) stride length decreased;gait speed decreased;festinating/shuffling;weight shifting decreased  -CT     Bilateral Gait Deviations weight shift ability decreased;forward flexed posture;knee buckling bilaterally  -CT     Row Name 11/26/22 1403          Motor Skills    Therapeutic Exercise --  BLE seated ther ex: marches, LAQ, AP, seated marches, glut sets, hip abd/add  -CT     Row Name 11/26/22 1403          Positioning and Restraints    Pre-Treatment Position sitting in chair/recliner  -CT     Post Treatment Position chair  -CT     In Chair sitting;call light within reach;encouraged to call for assist;notified nsg  -CT           User Key  (r) = Recorded By, (t) = Taken By, (c) = Cosigned By    Initials Name Provider Type    CT Bhargavi Vásquez PT Physical Therapist                Physical Therapy Education     Title: PT OT SLP Therapies (Done)     Topic: Physical Therapy (Done)     Point: Mobility training (Done)     Learning Progress Summary           Patient Acceptance, E,TB, VU by RM at 11/20/2022 1449    Acceptance, E,TB, VU by RM at 11/18/2022 1021    Acceptance, E,TB, VU by RH at 11/16/2022 1110    Acceptance, E,TB, VU by KK at 11/14/2022 1638    Acceptance, E,TB, VU by KK at 11/13/2022 1648    Acceptance, E,TB, VU by KG at 11/10/2022 1827    Acceptance, E,TB, VU by DM at 11/9/2022 1638    Acceptance, E,TB, VU by KK at 11/8/2022 1754    Acceptance, E,TB, VU by KK at 11/7/2022 1635    Acceptance, E,D, VU,NR by LL at  11/5/2022 1325    Acceptance, E, VU by EB at 11/4/2022 1448    Acceptance, E,TB, VU by KM at 11/4/2022 1101                   Point: Home exercise program (Done)     Learning Progress Summary           Patient Acceptance, E,TB, VU by RM at 11/20/2022 1449    Acceptance, E,TB, VU by RM at 11/18/2022 1021    Acceptance, E,TB, VU by RH at 11/16/2022 1110    Acceptance, E,TB, VU by KK at 11/14/2022 1638    Acceptance, E,TB, VU by KK at 11/13/2022 1648    Acceptance, E,TB, VU by KG at 11/10/2022 1827    Acceptance, E,TB, VU by DM at 11/9/2022 1638    Acceptance, E,TB, VU by KK at 11/8/2022 1754    Acceptance, E,TB, VU by KK at 11/7/2022 1635    Acceptance, E,D, VU,NR by LL at 11/5/2022 1325    Acceptance, E, VU by EB at 11/4/2022 1448    Acceptance, E,TB, VU by KM at 11/4/2022 1101                   Point: Body mechanics (Done)     Learning Progress Summary           Patient Acceptance, E,TB, VU by RM at 11/20/2022 1449    Acceptance, E,TB, VU by RM at 11/18/2022 1021    Acceptance, E,TB, VU by RH at 11/16/2022 1110    Acceptance, E,TB, VU by KK at 11/14/2022 1638    Acceptance, E,TB, VU by KK at 11/13/2022 1648    Acceptance, E,TB, VU by KG at 11/10/2022 1827    Acceptance, E,TB, VU by DM at 11/9/2022 1638    Acceptance, E,TB, VU by KK at 11/8/2022 1754    Acceptance, E,TB, VU by KK at 11/7/2022 1635    Acceptance, E,D, VU,NR by LL at 11/5/2022 1325    Acceptance, E, VU by EB at 11/4/2022 1448    Acceptance, E,TB, VU by KM at 11/4/2022 1101                   Point: Precautions (Done)     Learning Progress Summary           Patient Acceptance, E,TB, VU by RM at 11/20/2022 1449    Acceptance, E,TB, VU by RM at 11/18/2022 1021    Acceptance, E,TB, VU by RH at 11/16/2022 1110    Acceptance, E,TB, VU by KK at 11/14/2022 1638    Acceptance, E,TB, VU by KK at 11/13/2022 1648    Acceptance, E,TB, VU by KG at 11/10/2022 1827    Acceptance, E,TB, VU by DM at 11/9/2022 1638    Acceptance, E,TB, VU by KK at 11/8/2022 1754     Acceptance, E,TB, VU by KK at 11/7/2022 1635    Acceptance, E,D, VU,NR by LL at 11/5/2022 1325    Acceptance, E, VU by EB at 11/4/2022 1448    Acceptance, E,TB, VU by KM at 11/4/2022 1101                               User Key     Initials Effective Dates Name Provider Type Discipline    LL 05/02/16 -  Neda Garcia, NACHO Physical Therapist Assistant PT    RM 06/14/22 -  Bethany Krause RN Registered Nurse Nurse    DM 06/16/21 -  Moniz, Deborah, RN Registered Nurse Nurse    KM 05/24/22 -  Mao Vázquez, EDEL Physical Therapist PT    EB 09/22/22 -  Cedric Oliver, RN Registered Nurse Nurse    KK 06/27/22 -  Chelsi Bolivar, RN Registered Nurse Nurse    KG 09/22/22 -  Verónica Rodriguez, RN Registered Nurse Nurse     08/03/22 -  Jung Russell RN Extern Registered Nurse Nurse              PT Recommendation and Plan  Anticipated Discharge Disposition (PT): home with 24/7 [facility], [facility] nursing [facility]  Planned Therapy Interventions (PT): balance training, bed mobility training, gait training, home exercise program, manual therapy techniques, motor coordination training, neuromuscular re-education, patient/family education, postural re-education, strengthening, transfer training  Therapy Frequency (PT): 5 times/wk (5-6 times/wk)  Progress Summary (PT)  Progress Toward Functional Goals (PT): progress toward functional goals is gradual  Plan of Care Reviewed With: patient  Progress: no change   Outcome Measures     Row Name 11/26/22 1205 11/25/22 1300 11/25/22 1100       How much help from another person do you currently need...    Turning from your back to your side while in flat bed without using bedrails? -- -- 4  -KH    Moving from lying on back to sitting on the side of a flat bed without bedrails? -- -- 3  -KH    Moving to and from a bed to a chair (including a wheelchair)? -- -- 2  -KH    Standing up from a chair using your arms (e.g., wheelchair, bedside chair)? -- -- 2  -KH    Climbing 3-5 steps with a railing? --  -- 1  -KH    To walk in hospital room? -- -- 2  -KH    AM-PAC 6 Clicks Score (PT) -- -- 14  -KH       How much help from another is currently needed...    Putting on and taking off regular lower body clothing? 2  -KR 2  -LM --    Bathing (including washing, rinsing, and drying) 2  -KR 2  -LM --    Toileting (which includes using toilet bed pan or urinal) 2  -KR 2  -LM --    Putting on and taking off regular upper body clothing 3  -KR 3  -LM --    Taking care of personal grooming (such as brushing teeth) 3  -KR 3  -LM --    Eating meals 4  -KR 4  -LM --    AM-PAC 6 Clicks Score (OT) 16  -KR 16  -LM --       Functional Assessment    Outcome Measure Options -- AM-PAC 6 Clicks Daily Activity (OT)  -LM AM-PAC 6 Clicks Basic Mobility (PT)  -KH          User Key  (r) = Recorded By, (t) = Taken By, (c) = Cosigned By    Initials Name Provider Type    KR Andres Uriarte, OT Occupational Therapist    LM Татьяна Bah, OT Occupational Therapist    KH Radha Vu, PT Physical Therapist                 Time Calculation:    PT Charges     Row Name 11/26/22 1410             Time Calculation    Start Time 1240  -CT      PT Received On 11/26/22  -CT         Time Calculation- PT    Total Timed Code Minutes- PT 32 minute(s)  -CT            User Key  (r) = Recorded By, (t) = Taken By, (c) = Cosigned By    Initials Name Provider Type    CT Bhargavi Vásquez, PT Physical Therapist              Therapy Charges for Today     Code Description Service Date Service Provider Modifiers Qty    41803999125 HC GAIT TRAINING EA 15 MIN 11/26/2022 Bhargavi Vásquez, PT GP 2    75450395145 HC PT THER PROC EA 15 MIN 11/26/2022 Bhargavi Vásquez, PT GP 1          PT G-Codes  Outcome Measure Options: AM-PAC 6 Clicks Daily Activity (OT)  AM-PAC 6 Clicks Score (PT): 13  AM-PAC 6 Clicks Score (OT): 16    Bhargavi Vásquez PT  11/26/2022      Electronically signed by Bhargavi Vásquez, PT at 11/26/22 1411          Occupational Therapy Notes (most recent note)       Andres Uriarte, OT at 22 1206          Acute Care - Occupational Therapy Treatment Note   [place]     Patient Name: Tre Kinney  : 1932  MRN: [id number]  Today's Date: 2022  Onset of Illness/Injury or Date of Surgery: 10/06/22     Referring Physician: Janneth    Admit Date: 10/5/2022     No diagnosis found.  Patient Active Problem List   Diagnosis   • Acid reflux   • Ankle arthralgia   • Cardiac conduction disorder   • Closed fracture of distal fibula   • Arteriosclerosis of coronary artery   • Fracture of distal end of tibia   • Elevated cholesterol   • BP (high blood pressure)   • Hypertrophic polyarthritis   • OP (osteoporosis)   • Right heart failure (HCC)   • Heart failure, chronic, right-sided (HCC)   • SOB (shortness of breath)   • Palpitations   • CHF (congestive heart failure) (HCC)   • Congestive heart failure (HCC)   • Precordial pain   • Chronic anticoagulation   • Hypokalemia   • Anemia, unspecified type   • Generalized weakness     Past Medical History:   Diagnosis Date   • Anemia    • Arthritis    • CHF (congestive heart failure) (HCC)    • Chronic kidney disease    • Coronary artery disease    • H/O blood clots    • History of transfusion     no reaction    • Hypertension      Past Surgical History:   Procedure Laterality Date   • ABDOMINAL SURGERY     • APPENDECTOMY     • CARDIAC SURGERY     •  SECTION     • COLONOSCOPY N/A 3/7/2018    Procedure: COLONOSCOPY;  Surgeon: Willie Gresham MD;  Location:  [facility];  Service:    • CORONARY ANGIOPLASTY WITH STENT PLACEMENT     • ENDOSCOPY N/A 3/6/2018    Procedure: ESOPHAGOGASTRODUODENOSCOPY;  Surgeon: Willie Gresham MD;  Location:  [facility];  Service:    • PACEMAKER IMPLANTATION      left side    • SKIN BIOPSY     • TUBAL ABDOMINAL LIGATION           OT ASSESSMENT FLOWSHEET (last 12 hours)     OT Evaluation and Treatment     Row Name 22 1200                   OT Time and Intention    Document Type  therapy note (daily note)  -KR        Mode of Treatment occupational therapy  -KR        Patient Effort good  -KR        Comment Pt presented seated in chair at bedside for OT session today. Pt cooperative and motivated to perform ther ex. Facetime achieved with sonWillian. Family and pt agreed to forgo facetime communication at this time and resume call when PT performs mobility activity. OT contacted PT and informed to contact family upon PT session initiation. PT acknowledged and agreed. BUE ther ex performed from seated in chair. Trunk strengthening, sitting balance and general endurance activity performed adequately. OT to continue as tolerated.  -KR           General Information    General Observations of Patient alert/cooperative  -KR           Shoulder (Therapeutic Exercise)    Shoulder AROM (Therapeutic Exercise) bilateral;flexion;extension;aBduction;aDduction;sitting;10 repetitions;3 sets  -KR           Elbow/Forearm (Therapeutic Exercise)    Elbow/Forearm (Therapeutic Exercise) AROM (active range of motion)  -KR        Elbow/Forearm AROM (Therapeutic Exercise) bilateral;flexion;extension;sitting;10 repetitions;3 sets  -KR           Hand (Therapeutic Exercise)    Hand (Therapeutic Exercise) strengthening exercise  -KR        Hand Strengthening (Therapeutic Exercise)  strengthening;hand gripper;bilateral;finger flexion;finger extension  -KR           Balance    Dynamic Sitting Balance standby assist  -KR              User Key  (r) = Recorded By, (t) = Taken By, (c) = Cosigned By    Initials Name Effective Dates    KR Andres Uriarte, OT 06/16/21 -                        OT Recommendation and Plan     Progress Toward Functional Goals (OT): progress toward functional goals is gradual  Plan of Care Review  Plan of Care Reviewed With: patient  Progress: improving  Plan of Care Reviewed With: patient     Outcome Measures     Row Name 11/26/22 1205 11/25/22 1300 11/25/22 1100       How much help from another  person do you currently need...    Turning from your back to your side while in flat bed without using bedrails? -- -- 4  -KH    Moving from lying on back to sitting on the side of a flat bed without bedrails? -- -- 3  -KH    Moving to and from a bed to a chair (including a wheelchair)? -- -- 2  -KH    Standing up from a chair using your arms (e.g., wheelchair, bedside chair)? -- -- 2  -KH    Climbing 3-5 steps with a railing? -- -- 1  -KH    To walk in hospital room? -- -- 2  -KH    AM-PAC 6 Clicks Score (PT) -- -- 14  -KH       How much help from another is currently needed...    Putting on and taking off regular lower body clothing? 2  -KR 2  -LM --    Bathing (including washing, rinsing, and drying) 2  -KR 2  -LM --    Toileting (which includes using toilet bed pan or urinal) 2  -KR 2  -LM --    Putting on and taking off regular upper body clothing 3  -KR 3  -LM --    Taking care of personal grooming (such as brushing teeth) 3  -KR 3  -LM --    Eating meals 4  -KR 4  -LM --    AM-PAC 6 Clicks Score (OT) 16  -KR 16  -LM --       Functional Assessment    Outcome Measure Options -- AM-PAC 6 Clicks Daily Activity (OT)  -LM AM-PAC 6 Clicks Basic Mobility (PT)  -SURESH          User Key  (r) = Recorded By, (t) = Taken By, (c) = Cosigned By    Initials Name Provider Type    Andres Ayala, OT Occupational Therapist    LM Татьяна Bah, OT Occupational Therapist    Radha Yusuf, PT Physical Therapist                Time Calculation:    Time Calculation- OT     Row Name 11/26/22 1205             Time Calculation- OT    Total Timed Code Minutes- OT 15 minute(s)  -KARLO            User Key  (r) = Recorded By, (t) = Taken By, (c) = Cosigned By    Initials Name Provider Type    Andres Ayala OT Occupational Therapist              Therapy Charges for Today     Code Description Service Date Service Provider Modifiers Qty    60848538505  OT THERAPEUTIC ACT EA 15 MIN 11/26/2022 Andres Uriarte OT GO 1               Andres  HEIDI Uriarte OT  11/26/2022    Electronically signed by Andres Uriarte OT at 11/26/22 1207     Discharge Planning Assessment  [facility]     Patient Name: Tre Kinney                    MRN: [id number]  Today's Date: 11/25/2022                   Admit Date: 10/5/2022     Plan: Pt was admitted to swing bed on 10/5/22. Pt's insurance has approved swing bed admission through 11/28/22. Swing bed RN will submit updated clinicals to pt's insurance on 11/28/22 to request additional swing bed days. SS to follow and assist with discharge planning.

## 2022-11-28 NOTE — PROGRESS NOTES
[facility] HOSPITALIST PROGRESS NOTE     Patient Identification:  Name:  Tre Kinney  Age:  89 y.o.  Sex:  female  :  1932  MRN:  [id number]  Visit Number:  [id number]  ROOM: [address] [place] [address]     Primary Care Provider:  Jessica Pedro APRN     Date of Admission: 10/5/2022    Length of stay in inpatient status:  54    Subjective     Chief Compliant:  Generalized weakness post fall    History of Presenting Illness:  Bedside nurse Destinee was present during my evaluation of the patient.  The patient had fallen asleep while sitting up in her bed; she awoke easily.  She currently denies pain anywhere.  She specifically denies chest pain, nausea, trouble breathing, and coughing.  She did not eat a lot of breakfast but she states that she is not hungry today; she declined my offer of providing her with a different breakfast.    Objective     Current Hospital Meds:  apixaban, 5 mg, Oral, Q12H  escitalopram, 10 mg, Oral, Daily  furosemide, 40 mg, Oral, BID  gabapentin, 300 mg, Oral, Nightly  hydrALAZINE, 5 mg, Oral, Q8H  melatonin, 10 mg, Oral, Nightly  metoprolol tartrate, 25 mg, Oral, Q12H  multivitamin, 1 tablet, Oral, Daily  pantoprazole, 40 mg, Oral, Q AM  sodium chloride, 10 mL, Intravenous, Q12H  sodium chloride, 10 mL, Intravenous, Q12H  tamsulosin, 0.4 mg, Oral, Daily    Pharmacy Consult,       Current Antimicrobial Therapy:  Anti-Infectives (From admission, onward)    Ordered     Dose/Rate Route Frequency Start Stop    22 1536  cefTRIAXone (ROCEPHIN) 1 g in sodium chloride 0.9 % 100 mL IVPB-VTB        Ordering Provider: Dante Levine MD    1 g  200 mL/hr over 30 Minutes Intravenous Every 24 Hours 22 2300 22 0110        Current Diuretic Therapy:  Diuretics (From admission, onward)    Ordered     Dose/Rate Route Frequency Start Stop    10/05/22 1252  furosemide (LASIX) tablet 40 mg        Ordering Provider: Jannet Lagunas MD    40 mg Oral 2 Times Daily (Diuretics) 10/05/22  1800          ----------------------------------------------------------------------------------------------------------------------  Vital Signs:  Temp:  [97.9 °F (36.6 °C)-98.2 °F (36.8 °C)] 97.9 °F (36.6 °C)  Heart Rate:  [75-77] 77  Resp:  [18-20] 20  BP: (142-158)/(55-75) 158/75  SpO2:  [94 %-98 %] 94 %  on   ;   Device (Oxygen Therapy): room air  Body mass index is 26.49 kg/m².    Wt Readings from Last 3 Encounters:   11/28/22 76.7 kg (169 lb 3.2 oz)   10/05/22 76.6 kg (168 lb 14.4 oz)   09/08/22 76.7 kg (169 lb)     Intake & Output (last 3 days)       11/25 0701 11/26 0700 11/26 0701 11/27 0700 11/27 0701 11/28 0700 11/28 0701 11/29 0700    P.O.      I.V. (mL/kg) 0 (0)       Total Intake(mL/kg) 720 (9.7) 480 (6.4) 1560 (20.3)     Urine (mL/kg/hr) 1550 (0.9) 1325 (0.7) 1350 (0.7)     Stool  0 0     Total Output 1550 1325 1350     Net -830 -845 +210             Stool Unmeasured Occurrence  2 x 2 x         Diet: Texture: Soft to Chew (NDD 3); Soft to Chew: Chopped Meat; Fluid Consistency: Regular Consistency; Regular/House Diet  ----------------------------------------------------------------------------------------------------------------------  Physical Exam  Vitals and nursing note reviewed. Exam conducted with a chaperone present.   Constitutional:       General: She is sleeping. She is not in acute distress.     Appearance: She is not ill-appearing.   Cardiovascular:      Rate and Rhythm: Normal rate and regular rhythm.      Pulses: Normal pulses.      Heart sounds: No murmur heard.  Pulmonary:      Effort: Pulmonary effort is normal. No respiratory distress.      Breath sounds: No wheezing or rales.   Musculoskeletal:         General: No swelling or deformity.   Skin:     Coloration: Skin is not jaundiced or pale.   Neurological:      Mental Status: She is oriented to person, place, and time and easily aroused. Mental status is at baseline.      Cranial Nerves: No cranial nerve deficit.    Psychiatric:         Mood and Affect: Mood normal.         Behavior: Behavior normal.       ----------------------------------------------------------------------------------------------------------------------  Tele:   None as she is in swing bed.  ----------------------------------------------------------------------------------------------------------------------  LABS:    CBC and coagulation:  Results from last 7 days   Lab Units 11/25/22  0233   WBC 10*3/mm3 6.00   HEMOGLOBIN g/dL 9.5*   HEMATOCRIT % 31.1*   MCV fL 96.9   MCHC g/dL 30.5*   PLATELETS 10*3/mm3 203     Renal and electrolytes:  Results from last 7 days   Lab Units 11/25/22  0234   SODIUM mmol/L 137   POTASSIUM mmol/L 4.1   CHLORIDE mmol/L 99   CO2 mmol/L 26.2   BUN mg/dL 34*   CREATININE mg/dL 1.80*   CALCIUM mg/dL 9.1   GLUCOSE mg/dL 103*     Estimated Creatinine Clearance: 22.6 mL/min (A) (by C-G formula based on SCr of 1.8 mg/dL (H)).    Liver and pancreatic function:  Results from last 7 days   Lab Units 11/25/22  0234   ALBUMIN g/dL 2.67*   BILIRUBIN mg/dL 0.2   ALK PHOS U/L 43   AST (SGOT) U/L 9   ALT (SGPT) U/L 5     Endocrine function:  Lab Results   Component Value Date    HGBA1C 4.40 (L) 10/07/2022     Glucose levels from the CMP:  Results from last 7 days   Lab Units 11/25/22  0234   GLUCOSE mg/dL 103*     Lab Results   Component Value Date    TSH 3.030 10/07/2022     Cardiac:  Results from last 7 days   Lab Units 11/24/22  0333   TROPONIN T ng/mL 0.013       Cultures:  Lab Results   Component Value Date    COLORU Yellow 11/18/2022    CLARITYU Turbid (A) 11/18/2022    PHUR 6.5 11/18/2022    PROTEINUR 11.0 09/20/2022    GLUCOSEU Negative 11/18/2022    KETONESU Negative 11/18/2022    BLOODU Large (3+) (A) 11/18/2022    NITRITEU Negative 11/18/2022    LEUKOCYTESUR Large (3+) (A) 11/18/2022    BILIRUBINUR Negative 11/18/2022    UROBILINOGEN 1.0 E.U./dL 11/18/2022    RBCUA 31-50 (A) 11/18/2022    WBCUA Too Numerous to Count (A) 11/18/2022     BACTERIA 1+ (A) 11/18/2022     Microbiology Results (last 10 days)     Procedure Component Value - Date/Time    Blood Culture - Blood, Arm, Left [[id number]]  (Normal) Collected: 11/18/22 2130    Lab Status: Final result Specimen: Blood from Arm, Left Updated: 11/23/22 2200     Blood Culture No growth at 5 days    Blood Culture - Blood, Arm, Right [[id number]]  (Normal) Collected: 11/18/22 2130    Lab Status: Final result Specimen: Blood from Arm, Right Updated: 11/23/22 2200     Blood Culture No growth at 5 days    Urine Culture - Urine, Indwelling Urethral Catheter [[id number]]  (Abnormal) Collected: 11/18/22 0953    Lab Status: Final result Specimen: Urine from Indwelling Urethral Catheter Updated: 11/19/22 1421     Urine Culture >100,000 CFU/mL Mixed Gram Negative Carolyn    Narrative:      Specimen contains mixed organisms of questionable pathogenicity suggestive of contamination. If symptoms persist, suggest recollection.  Colonization of the urinary tract without infection is common. Treatment is discouraged unless the patient is symptomatic, pregnant, or undergoing an invasive urologic procedure.        I have personally looked at the labs and they are summarized above.    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  -As of 11/18/2022, mixed gram negative UTI (the bacteria were not identified on the culture)  -History of recent acute superior endplate compression fracture of  L1 with retropulsion (9/8/2022)  -History of leukocytosis, suspect due to mechanical fall that occurred prior to the [facility] 9/19/2022-10/5/2022  -Acute kidney injury on top of chronic kidney disease stage IV (baseline creatinine 2.1-2.4 and admission creatinine 6.1) and anion gap metabolic acidosis present on admission (suspect due to a combination of lactic acidosis from the acute blood loss anemia and renal acidosis) during the [facility] 9/19/2022-10/5/2022 hospitalization  -History of paroxysmal atrial fibrillation, in sinus rhythm during this hospitalization  -History of SSS s/p pacemaker placement  -History of acute blood loss anemia in 2018, suspect secondary to diverticular bleed  -History of CAD status post previous cardiac stenting  -History of chronic CHFpEF, with no acute exacerbation during this hospitalization  -History of generalized anxiety disorder/depression    The patient has been approved for more swing bed days through 12/3/2022.  Will encourage the patient to move around and sit up/walk as much as possible.  Will encourage the patient to eat as much as possible to increase her strength.  I reviewed the labs from 11/20/2022 and 11/25/2022 and there is not much difference between the two sets.  Will plan on another set of labs one week from the last set, unless she declines in the meantime.  I have reviewed the vital signs and the blood pressures have been stable; will continue with the current antihypertensives.    VTE Prophylaxis:      Pharmalogical Order History:      Ordered     Dose Route Frequency Stop    10/27/22 1034  apixaban (ELIQUIS) tablet 5 mg         5 mg PO Every 12 Hours Scheduled --    10/21/22 1301  Enoxaparin Sodium (LOVENOX) syringe 70 mg  Status:  Discontinued         1 mg/kg SC Every 24 Hours 10/27/22 1034    10/17/22 1147  Enoxaparin Sodium (LOVENOX) syringe 80 mg  Status:  Discontinued         1 mg/kg SC Every 24 Hours 10/21/22 1301    10/12/22  1847  Enoxaparin Sodium (LOVENOX) syringe 70 mg  Status:  Discontinued         1 mg/kg SC Every 24 Hours 10/17/22 1147    10/12/22 1831  Pharmacy to Dose enoxaparin (LOVENOX)  Status:  Discontinued         -- XX Continuous PRN 10/13/22 1417    10/05/22 1252  heparin (porcine) 5000 UNIT/ML injection 5,000 Units  Status:  Discontinued         5,000 Units SC Every 12 Hours Scheduled 10/12/22 1846            Disposition:  Family and patient request home with family once physically improved    Jannet Lagunas MD  [facility] Hospitalist  11/28/22  09:04 EST

## 2022-11-28 NOTE — PLAN OF CARE
Goal Outcome Evaluation:               Pt resting with no complaints at this time. Pt up to chair at bedside. Pt worked with PT this shift.

## 2022-11-29 PROCEDURE — 97116 GAIT TRAINING THERAPY: CPT

## 2022-11-29 PROCEDURE — 97530 THERAPEUTIC ACTIVITIES: CPT

## 2022-11-29 PROCEDURE — 97110 THERAPEUTIC EXERCISES: CPT

## 2022-11-29 RX ADMIN — GABAPENTIN 300 MG: 300 CAPSULE ORAL at 21:09

## 2022-11-29 RX ADMIN — PANTOPRAZOLE SODIUM 40 MG: 40 TABLET, DELAYED RELEASE ORAL at 06:16

## 2022-11-29 RX ADMIN — Medication 10 ML: at 09:22

## 2022-11-29 RX ADMIN — FUROSEMIDE 40 MG: 40 TABLET ORAL at 18:00

## 2022-11-29 RX ADMIN — Medication 1 TABLET: at 09:22

## 2022-11-29 RX ADMIN — METOPROLOL TARTRATE 25 MG: 25 TABLET, FILM COATED ORAL at 09:23

## 2022-11-29 RX ADMIN — Medication 10 ML: at 21:08

## 2022-11-29 RX ADMIN — APIXABAN 5 MG: 5 TABLET, FILM COATED ORAL at 21:09

## 2022-11-29 RX ADMIN — FUROSEMIDE 40 MG: 40 TABLET ORAL at 09:22

## 2022-11-29 RX ADMIN — METOPROLOL TARTRATE 25 MG: 25 TABLET, FILM COATED ORAL at 21:08

## 2022-11-29 RX ADMIN — Medication 10 MG: at 21:08

## 2022-11-29 RX ADMIN — HYDRALAZINE HYDROCHLORIDE 5 MG: 25 TABLET, FILM COATED ORAL at 06:16

## 2022-11-29 RX ADMIN — HYDRALAZINE HYDROCHLORIDE 5 MG: 25 TABLET, FILM COATED ORAL at 21:08

## 2022-11-29 RX ADMIN — Medication 10 ML: at 21:09

## 2022-11-29 RX ADMIN — HYDRALAZINE HYDROCHLORIDE 5 MG: 25 TABLET, FILM COATED ORAL at 17:00

## 2022-11-29 RX ADMIN — TAMSULOSIN HYDROCHLORIDE 0.4 MG: 0.4 CAPSULE ORAL at 09:22

## 2022-11-29 RX ADMIN — ESCITALOPRAM 10 MG: 10 TABLET, FILM COATED ORAL at 09:22

## 2022-11-29 RX ADMIN — APIXABAN 5 MG: 5 TABLET, FILM COATED ORAL at 09:22

## 2022-11-29 NOTE — PROGRESS NOTES
[facility] HOSPITALISTS CROSS COVER NOTE    Patient Identification:  Name:  Tre Kinney  Age:  89 y.o.  Sex:  female  :  1932  MRN:  [id number]  Visit number:  [id number]  Primary Care Provider:  Jessica Pedro APRN    Length of stay in inpatient status:  55    Brief Update     I saw the patient today while she was eating her breakfast.  Nursing staff reported to me that the patient had declined to sit in her chair this morning to eat her breakfast, preferring to sit in her bed.  The patient also told the nursing staff that she prefers to do her physical therapy when her family is watching so that she can give her best effort forward while her family is watching.  I explained to the patient that her willingness to show her family what she can do when she is well rested and at her best, though an admirable goal, it is not a realistic goal because the patient will be at home and expected to be able to perform her activities of daily living even during times when she is not as rested as desired.  Thus, I encouraged the patient to sit in the bedside chair after breakfast.  I turned on the overhead lights in the room to help with the patient's circadian rhythm, in an attempt to prevent her from falling asleep during daytime hours.  We will continue to monitor her physical progress daily.  No acute issues were identified today upon review of her chart nor reported to me by the nursing staff.     Jannet Lagunas MD   [facility]  22  18:04 EST

## 2022-11-29 NOTE — PLAN OF CARE
"Goal Outcome Evaluation:      The patient ambulated in the room briefly from chair to bed. She stated she \"was simply worn out, she don't need to sit up so long in the chair, because once she is up for therapy or to ambulate her legs just give out.\" She has participated in bladder training throughout the shift. She was able to verbalize that she was feeling pressure and needed the catheter unclamped once. She has rested overnight. No acute events or complaints overnight. Will continue with the plan of care.            "

## 2022-11-29 NOTE — THERAPY PROGRESS REPORT/RE-CERT
Acute Care - Occupational Therapy Progress Note   [place]     Patient Name: Tre Kinney  : 1932  MRN: [id number]  Today's Date: 2022  Onset of Illness/Injury or Date of Surgery: 10/06/22     Referring Physician: Janneth    Admit Date: 10/5/2022     No diagnosis found.  Patient Active Problem List   Diagnosis   • Acid reflux   • Ankle arthralgia   • Cardiac conduction disorder   • Closed fracture of distal fibula   • Arteriosclerosis of coronary artery   • Fracture of distal end of tibia   • Elevated cholesterol   • BP (high blood pressure)   • Hypertrophic polyarthritis   • OP (osteoporosis)   • Right heart failure (HCC)   • Heart failure, chronic, right-sided (HCC)   • SOB (shortness of breath)   • Palpitations   • CHF (congestive heart failure) (HCC)   • Congestive heart failure (HCC)   • Precordial pain   • Chronic anticoagulation   • Hypokalemia   • Anemia, unspecified type   • Generalized weakness     Past Medical History:   Diagnosis Date   • Anemia    • Arthritis    • CHF (congestive heart failure) ([facility])    • Chronic kidney disease    • Coronary artery disease    • H/O blood clots    • History of transfusion     no reaction    • Hypertension      Past Surgical History:   Procedure Laterality Date   • ABDOMINAL SURGERY     • APPENDECTOMY     • CARDIAC SURGERY     •  SECTION     • COLONOSCOPY N/A 3/7/2018    Procedure: COLONOSCOPY;  Surgeon: Willie Gresham MD;  Location:  [facility];  Service:    • CORONARY ANGIOPLASTY WITH STENT PLACEMENT     • ENDOSCOPY N/A 3/6/2018    Procedure: ESOPHAGOGASTRODUODENOSCOPY;  Surgeon: Willie Gresham MD;  Location:  [facility];  Service:    • PACEMAKER IMPLANTATION      left side    • SKIN BIOPSY     • TUBAL ABDOMINAL LIGATION           OT ASSESSMENT FLOWSHEET (last 12 hours)     OT Evaluation and Treatment     Row Name 22 1416                   OT Time and Intention    Subjective Information no complaints  -        Document Type  progress note/recertification;therapy note (daily note)  -        Mode of Treatment individual therapy;occupational therapy  -        Total Minutes, Occupational Therapy 40  -KP        Patient Effort good  -KP        Symptoms Noted During/After Treatment none  -KP           General Information    Patient Profile Reviewed yes  -KP        General Observations of Patient Patient agreeable to therapy. Patient in good spirits on this date. No complaints voiced. Son on Facetime throughout session 12:50 to 13:30. PT present with OT assisting during functional mobility for safety.  -           Pain Assessment    Pretreatment Pain Rating 0/10 - no pain  -KP        Posttreatment Pain Rating 0/10 - no pain  -KP           Cognition    Affect/Mental Status (Cognition) WFL  -        Orientation Status (Cognition) oriented x 4  -KP        Follows Commands (Cognition) WFL  -        Personal Safety Interventions gait belt;nonskid shoes/slippers when out of bed  -           Bathing Assessment/Intervention    [place] Level (Bathing) upper body;set up;lower body;minimum assist (75% patient effort)  -           Upper Body Dressing Assessment/Training    [place] Level (Upper Body Dressing) upper body dressing skills;set up;standby assist  -           Lower Body Dressing Assessment/Training    [place] Level (Lower Body Dressing) lower body dressing skills;maximum assist (25% patient effort)  -        Position (Lower Body Dressing) supported sitting  -           Grooming Assessment/Training    [place] Level (Grooming) grooming skills;hair care, combing/brushing;wash face, hands;set up;standby assist  -           Self-Feeding Assessment/Training    [place] Level (Feeding) feeding skills;set up  -           Toileting Assessment/Training    [place] Level (Toileting) dependent (less than 25% patient effort);toileting skills  -        Assistive Devices (Toileting) commode, bedside without  drop arms  -KP        Comment, (Toileting) Still requiring second person for toilet hygiene, but improved tolerance for standing and transition from sit to stand  -KP           Sit-Stand Transfer    Sit-Stand [place] (Transfers) minimum assist (75% patient effort);moderate assist (50% patient effort)  -        Assistive Device (Sit-Stand Transfers) walker, front-wheeled  -KP        Comment, (Sit-Stand Transfer) single person assist  -KP           Stand-Sit Transfer    Stand-Sit [place] (Transfers) minimum assist (75% patient effort)  -KP        Assistive Device (Stand-Sit Transfers) walker, front-wheeled  -KP        Comment, (Stand-Sit Transfer) single person assist  -KP           Toilet Transfer    Type (Toilet Transfer) stand pivot/stand step  -        [place] Level (Toilet Transfer) moderate assist (50% patient effort);minimum assist (75% patient effort);1 person assist  -KP        Assistive Device (Toilet Transfer) commode, bedside without drop arms;walker, front-wheeled  -           Motor Skills    Functional Endurance fair plis  -        Therapeutic Exercise other (see comments)  BUE exercise through functional movement patterns with yellow theraband X20 reps x2 sets (shoulder flexion AROM)  -           Coping    Observed Emotional State pleasant;cooperative  -        Verbalized Emotional State acceptance  -        Family/Support Persons family  -           Plan of Care Review    Plan of Care Reviewed With patient  -KP        Progress improving  -KP           Positioning and Restraints    Pre-Treatment Position sitting in chair/recliner  -KP        Post Treatment Position chair  -KP        In Chair call light within reach  -KP           Progress Summary (OT)    Progress Toward Functional Goals (OT) progress toward functional goals is gradual  -        Daily Progress Summary (OT) Patient has improved with functional endurance, motivation towards therapy, and standing tolerance  recently. She is able to attempt multiple standing and functional mobility trials during single session. Improved tolerance for UE exercises with tolerance and strength. Functional performance with ADLs has not significantly improved due to patient already able to complete UB tasks while seated with set-up, and second person continues to be required for safety with standing components of ADLs.  -KP        Impairments Still Limiting Function (OT) impaired standing tolerance/balance, limited functional endurance/activity tolerance, generalized weakness  -KP           Bed Mobility Goal 1 (OT)    Activity/Assistive Device (Bed Mobility Goal 1, OT) bed mobility activities, all  -KP        [place] Level/Cues Needed (Bed Mobility Goal 1, OT) modified independence  -KP        Time Frame (Bed Mobility Goal 1, OT) by discharge  -KP        Progress/Outcomes (Bed Mobility Goal 1, OT) progress slower than expected;unable to make needed progress  -KP           Transfer Goal 1 (OT)    Activity/Assistive Device (Transfer Goal 1, OT) bed-to-chair/chair-to-bed;commode, 3-in-1  -KP        [place] Level/Cues Needed (Transfer Goal 1, OT) contact guard required  -KP        Time Frame (Transfer Goal 1, OT) by discharge  -KP        Progress/Outcome (Transfer Goal 1, OT) progress slower than expected;unable to make needed progress  -KP           Dressing Goal 1 (OT)    Activity/Device (Dressing Goal 1, OT) dressing skills, all  -KP        [place]/Cues Needed (Dressing Goal 1, OT) minimum assist (75% or more patient effort);moderate assist (50-74% patient effort)  -KP        Progress/Outcome (Dressing Goal 1, OT) progress slower than expected;unable to make needed progress  -KP           Strength Goal 1 (OT)    Strength Goal 1 (OT) Increase BUE strength to 4/5 to promote increased safety with transfers and independence with ADLs.  -KP        Time Frame (Strength Goal 1, OT) by discharge  -KP        Progress/Outcome (Strength  Goal 1, OT) good progress toward goal  -KP            Activity Tolerance Goal 1 (OT)    Activity Level (Endurance Goal 1, OT) 15 min activity  -KP        Time Frame (Activity Tolerance Goal 1, OT) by discharge  -KP        Progress/Outcome (Activity Tolerance Goal 1, OT) continuing progress toward goal  -KP              User Key  (r) = Recorded By, (t) = Taken By, (c) = Cosigned By    Initials Name Effective Dates    KP Linn Quevedo, OT 06/16/21 -                        OT Recommendation and Plan     Progress Toward Functional Goals (OT): progress toward functional goals is gradual  Plan of Care Review  Plan of Care Reviewed With: patient  Progress: improving  Outcome Evaluation: Patient seen for OT treatment session (9:06 to 9:45). She declined calling son due to patient seen for self care tasks. She performed ADLs from recliner as documented. She is able to perform upper body tasks with set-up, but requires maximal assist with ADLs requiring standing. Continue plan of care.  Plan of Care Reviewed With: patient  Outcome Evaluation: Patient seen for OT treatment session (9:06 to 9:45). She declined calling son due to patient seen for self care tasks. She performed ADLs from recliner as documented. She is able to perform upper body tasks with set-up, but requires maximal assist with ADLs requiring standing. Continue plan of care.     Outcome Measures     Row Name 11/29/22 1427 11/28/22 1401          How much help from another is currently needed...    Putting on and taking off regular lower body clothing? 2  -KP 2  -KP     Bathing (including washing, rinsing, and drying) 2  -KP --     Toileting (which includes using toilet bed pan or urinal) 2  -KP 2  -KP     Putting on and taking off regular upper body clothing 3  -KP 3  -KP     Taking care of personal grooming (such as brushing teeth) 3  -KP 3  -KP     Eating meals 4  -KP 4  -KP     AM-PAC 6 Clicks Score (OT) 16  -KP --           User Key  (r) = Recorded By, (t) =  Taken By, (c) = Cosigned By    Initials Name Provider Type    Linn Richardson OT Occupational Therapist                Time Calculation:    Time Calculation- OT     Row Name 11/29/22 1427             Time Calculation- OT    OT Start Time 1250  -KP      OT Stop Time 1330  -KP      OT Time Calculation (min) 40 min  -KP      Total Timed Code Minutes- OT 40 minute(s)  -KP         Timed Charges    32492 - OT Therapeutic Exercise Minutes 17  -KP      14733 - OT Therapeutic Activity Minutes 10  -KP         Total Minutes    Timed Charges Total Minutes 27  -KP       Total Minutes 27  -KP            User Key  (r) = Recorded By, (t) = Taken By, (c) = Cosigned By    Initials Name Provider Type    Linn Richardson OT Occupational Therapist              Therapy Charges for Today     Code Description Service Date Service Provider Modifiers Qty    24114686033 HC OT THER PROC EA 15 MIN 11/28/2022 Linn Quevedo OT GO 1    08512270969 HC OT THERAPEUTIC ACT EA 15 MIN 11/29/2022 Linn Quevedo OT GO 1    47763929152 HC OT THER PROC EA 15 MIN 11/29/2022 Linn Quevedo OT GO 1               Linn Quevedo OT  11/29/2022

## 2022-11-29 NOTE — PROGRESS NOTES
Nutrition Services    Patient Name:  Tre Kinney  YOB: 1932  MRN: [id number]  Admit Date:  10/5/2022    BMI: 26.37  Diet: Soft to chew, chopped meats, regular  Supplement: Magic cup BID  Intake: 65% over 12 meals  Fluid intake: 1200ml not meeting needs    Electronically signed by:  Jose Oneal RD  11/29/22 08:22 EST

## 2022-11-29 NOTE — THERAPY TREATMENT NOTE
Acute Care - Physical Therapy Treatment Note   [place]     Patient Name: Tre Kinney  : 1932  MRN: [id number]  Today's Date: 2022   Onset of Illness/Injury or Date of Surgery: 10/06/22  Visit Dx:   No diagnosis found.  Patient Active Problem List   Diagnosis   • Acid reflux   • Ankle arthralgia   • Cardiac conduction disorder   • Closed fracture of distal fibula   • Arteriosclerosis of coronary artery   • Fracture of distal end of tibia   • Elevated cholesterol   • BP (high blood pressure)   • Hypertrophic polyarthritis   • OP (osteoporosis)   • Right heart failure (HCC)   • Heart failure, chronic, right-sided (HCC)   • SOB (shortness of breath)   • Palpitations   • CHF (congestive heart failure) (HCC)   • Congestive heart failure (HCC)   • Precordial pain   • Chronic anticoagulation   • Hypokalemia   • Anemia, unspecified type   • Generalized weakness     Past Medical History:   Diagnosis Date   • Anemia    • Arthritis    • CHF (congestive heart failure) (HCC)    • Chronic kidney disease    • Coronary artery disease    • H/O blood clots    • History of transfusion     no reaction    • Hypertension      Past Surgical History:   Procedure Laterality Date   • ABDOMINAL SURGERY     • APPENDECTOMY     • CARDIAC SURGERY     •  SECTION     • COLONOSCOPY N/A 3/7/2018    Procedure: COLONOSCOPY;  Surgeon: Willie Gresham MD;  Location:  [facility];  Service:    • CORONARY ANGIOPLASTY WITH STENT PLACEMENT     • ENDOSCOPY N/A 3/6/2018    Procedure: ESOPHAGOGASTRODUODENOSCOPY;  Surgeon: Willie Gresham MD;  Location:  [facility];  Service:    • PACEMAKER IMPLANTATION      left side    • SKIN BIOPSY     • TUBAL ABDOMINAL LIGATION       PT Assessment (last 12 hours)     PT Evaluation and Treatment     Row Name 22 1333          Physical Therapy Time and Intention    Subjective Information no complaints  -CT     Document Type re-evaluation;therapy note (daily note)  -CT     Mode of Treatment  physical therapy  -CT     Patient Effort adequate  -CT     Comment Pt treatment session [id number]. Pelon Dorsey via facetime. Pt ambulated x 3 attempts and performed seated ther ex in chair. OT present for treatment to assist with transfers and ambulation.  -CT     Row Name 11/29/22 1333          General Information    Patient Profile Reviewed yes  -CT     Limitations/Impairments safety/cognitive  -CT     Row Name 11/29/22 1333          Cognition    Affect/Mental Status (Cognition) WFL  -CT     Row Name 11/29/22 1333          Transfers    Transfers sit-stand transfer;stand-sit transfer  -CT     Row Name 11/29/22 1333          Sit-Stand Transfer    Sit-Stand [place] (Transfers) minimum assist (75% patient effort);moderate assist (50% patient effort)  Pt requires more assist with each attempt  -CT     Assistive Device (Sit-Stand Transfers) walker, front-wheeled  -CT     Row Name 11/29/22 1333          Stand-Sit Transfer    Stand-Sit [place] (Transfers) minimum assist (75% patient effort)  -CT     Assistive Device (Stand-Sit Transfers) walker, front-wheeled  -CT     Row Name 11/29/22 1333          Gait/Stairs (Locomotion)    Gait/Stairs Locomotion gait/ambulation assistive device  -CT     [place] Level (Gait) moderate assist (50% patient effort);2 person assist  -CT     Assistive Device (Gait) walker, front-wheeled  -CT     Distance in Feet (Gait) 5, 8, 10  -CT     Pattern (Gait) step-to  -CT     Deviations/Abnormal Patterns (Gait) stride length decreased;gait speed decreased;festinating/shuffling;weight shifting decreased  -CT     Bilateral Gait Deviations weight shift ability decreased;forward flexed posture;knee buckling bilaterally  -CT     Row Name 11/29/22 1333          Motor Skills    Therapeutic Exercise --  BLE seated ther ex: seated marches x 2, LAQ, AP, hip abd/add, glut sets 25 reps  -CT     Row Name 11/29/22 1338          Coping    Observed Emotional State pleasant;cooperative  -CT      Verbalized Emotional State acceptance  -CT     Family/Support Persons family  -CT     Row Name 11/29/22 7133          Plan of Care Review    Plan of Care Reviewed With patient  -CT     Row Name 11/29/22 2491          Positioning and Restraints    Pre-Treatment Position sitting in chair/recliner  -CT     Post Treatment Position chair  -CT     In Chair sitting;call light within reach;encouraged to call for assist;notified nsg  -CT     Row Name 11/29/22 1596          Bed Mobility Goal 1 (PT)    Activity/Assistive Device (Bed Mobility Goal 1, PT) --  -CT     [place] Level/Cues Needed (Bed Mobility Goal 1, PT) --  -CT     Time Frame (Bed Mobility Goal 1, PT) --  -CT     Progress/Outcomes (Bed Mobility Goal 1, PT) --  -CT     Row Name 11/29/22 4875          Transfer Goal 1 (PT)    Activity/Assistive Device (Transfer Goal 1, PT) --  -CT     [place] Level/Cues Needed (Transfer Goal 1, PT) --  -CT     Time Frame (Transfer Goal 1, PT) --  -CT     Progress/Outcome (Transfer Goal 1, PT) --  -CT     Row Name 11/29/22 8953          Gait Training Goal 1 (PT)    Activity/Assistive Device (Gait Training Goal 1, PT) --  -CT     [place] Level (Gait Training Goal 1, PT) --  -CT     Distance (Gait Training Goal 1, PT) --  -CT     Time Frame (Gait Training Goal 1, PT) --  -CT     Progress/Outcome (Gait Training Goal 1, PT) --  -CT           User Key  (r) = Recorded By, (t) = Taken By, (c) = Cosigned By    Initials Name Provider Type    CT Bhargavi Vásquez PT Physical Therapist                Physical Therapy Education     Title: PT OT SLP Therapies (Done)     Topic: Physical Therapy (Done)     Point: Mobility training (Done)     Learning Progress Summary           Patient Acceptance, E,TB, VU by BC at 11/27/2022 1319    Acceptance, E,TB, VU by RM at 11/20/2022 1449    Acceptance, E,TB, VU by RM at 11/18/2022 1021    Acceptance, E,TB, VU by RH at 11/16/2022 1110    Acceptance, E,TB, VU by KK at 11/14/2022 5012     Acceptance, E,TB, VU by KK at 11/13/2022 1648    Acceptance, E,TB, VU by KG at 11/10/2022 1827    Acceptance, E,TB, VU by DM at 11/9/2022 1638    Acceptance, E,TB, VU by KK at 11/8/2022 1754    Acceptance, E,TB, VU by KK at 11/7/2022 1635    Acceptance, E,D, VU,NR by LL at 11/5/2022 1325    Acceptance, E, VU by EB at 11/4/2022 1448    Acceptance, E,TB, VU by KM at 11/4/2022 1101                   Point: Home exercise program (Done)     Learning Progress Summary           Patient Acceptance, E,TB, VU by BC at 11/27/2022 1319    Acceptance, E,TB, VU by RM at 11/20/2022 1449    Acceptance, E,TB, VU by RM at 11/18/2022 1021    Acceptance, E,TB, VU by RH at 11/16/2022 1110    Acceptance, E,TB, VU by KK at 11/14/2022 1638    Acceptance, E,TB, VU by KK at 11/13/2022 1648    Acceptance, E,TB, VU by KG at 11/10/2022 1827    Acceptance, E,TB, VU by DM at 11/9/2022 1638    Acceptance, E,TB, VU by KK at 11/8/2022 1754    Acceptance, E,TB, VU by KK at 11/7/2022 1635    Acceptance, E,D, VU,NR by LL at 11/5/2022 1325    Acceptance, E, VU by EB at 11/4/2022 1448    Acceptance, E,TB, VU by KM at 11/4/2022 1101                   Point: Body mechanics (Done)     Learning Progress Summary           Patient Acceptance, E,TB, VU by BC at 11/27/2022 1319    Acceptance, E,TB, VU by RM at 11/20/2022 1449    Acceptance, E,TB, VU by RM at 11/18/2022 1021    Acceptance, E,TB, VU by RH at 11/16/2022 1110    Acceptance, E,TB, VU by KK at 11/14/2022 1638    Acceptance, E,TB, VU by KK at 11/13/2022 1648    Acceptance, E,TB, VU by KG at 11/10/2022 1827    Acceptance, E,TB, VU by DM at 11/9/2022 1638    Acceptance, E,TB, VU by KK at 11/8/2022 1754    Acceptance, E,TB, VU by KK at 11/7/2022 1635    Acceptance, E,D, VU,NR by LL at 11/5/2022 1325    Acceptance, E, VU by EB at 11/4/2022 1448    Acceptance, E,TB, VU by KM at 11/4/2022 1101                   Point: Precautions (Done)     Learning Progress Summary           Patient Acceptance, E,TB, VU by  BC at 11/27/2022 1319    Acceptance, E,TB, VU by RM at 11/20/2022 1449    Acceptance, E,TB, VU by RM at 11/18/2022 1021    Acceptance, E,TB, VU by RH at 11/16/2022 1110    Acceptance, E,TB, VU by KK at 11/14/2022 1638    Acceptance, E,TB, VU by KK at 11/13/2022 1648    Acceptance, E,TB, VU by KG at 11/10/2022 1827    Acceptance, E,TB, VU by DM at 11/9/2022 1638    Acceptance, E,TB, VU by KK at 11/8/2022 1754    Acceptance, E,TB, VU by KK at 11/7/2022 1635    Acceptance, E,D, VU,NR by LL at 11/5/2022 1325    Acceptance, E, VU by EB at 11/4/2022 1448    Acceptance, E,TB, VU by KM at 11/4/2022 1101                               User Key     Initials Effective Dates Name Provider Type Discipline    LL 05/02/16 -  Neda Garcia, NACHO Physical Therapist Assistant PT    RM 06/14/22 -  Bethany Krause RN Registered Nurse Nurse    DM 06/16/21 -  Moniz, Deborah, RN Registered Nurse Nurse    BC 08/02/22 -  Collett, Brittany, RN Registered Nurse Nurse    KM 05/24/22 -  Mao Vázquez PT Physical Therapist PT    EB 09/22/22 -  Cedric Oliver, RN Registered Nurse Nurse    KK 06/27/22 -  Chelsi Bolivar, RN Registered Nurse Nurse    KG 09/22/22 -  Verónica Rodriguez, RN Registered Nurse Nurse     08/03/22 -  Jung Russell, RN Extern Registered Nurse Nurse              PT Recommendation and Plan  Anticipated Discharge Disposition (PT): home with 24/7 care, skilled nursing facility  Planned Therapy Interventions (PT): balance training, bed mobility training, gait training, home exercise program, manual therapy techniques, motor coordination training, neuromuscular re-education, patient/family education, postural re-education, strengthening, transfer training  Therapy Frequency (PT): 5 times/wk (5-6 times/wk)  Progress Summary (PT)  Progress Toward Functional Goals (PT): progress toward functional goals is gradual  Plan of Care Reviewed With: patient  Progress: improving   Outcome Measures     Row Name 11/28/22 1401             How  much help from another is currently needed...    Putting on and taking off regular lower body clothing? 2  -KP      Toileting (which includes using toilet bed pan or urinal) 2  -KP      Putting on and taking off regular upper body clothing 3  -KP      Taking care of personal grooming (such as brushing teeth) 3  -KP      Eating meals 4  -KP            User Key  (r) = Recorded By, (t) = Taken By, (c) = Cosigned By    Initials Name Provider Type    KP Linn Quevedo, OT Occupational Therapist                 Time Calculation:    PT Charges     Row Name 11/29/22 1345             Time Calculation    Start Time 1240  -CT      Stop Time 1325  -CT      Time Calculation (min) 45 min  -CT      PT Received On 11/29/22  -CT            User Key  (r) = Recorded By, (t) = Taken By, (c) = Cosigned By    Initials Name Provider Type    CT Bhargavi Vásquez, PT Physical Therapist              Therapy Charges for Today     Code Description Service Date Service Provider Modifiers Qty    27548566606 HC GAIT TRAINING EA 15 MIN 11/28/2022 Bhargavi Vásquez, PT GP 1    94562337127 HC PT THER PROC EA 15 MIN 11/28/2022 Bhargavi Vásquez, PT GP 1    16432912155 HC PT RE-EVAL ESTABLISHED PLAN 2 11/28/2022 Bhargavi Vásquez, PT GP 1    19828785166 HC GAIT TRAINING EA 15 MIN 11/29/2022 Bhargavi Vásquez, PT GP 1          PT G-Codes  Outcome Measure Options: AM-PAC 6 Clicks Daily Activity (OT)  AM-PAC 6 Clicks Score (PT): 13  AM-PAC 6 Clicks Score (OT): 16    Bhargavi Vásquez PT  11/29/2022

## 2022-11-30 PROCEDURE — 97110 THERAPEUTIC EXERCISES: CPT

## 2022-11-30 PROCEDURE — 97530 THERAPEUTIC ACTIVITIES: CPT

## 2022-11-30 PROCEDURE — 97116 GAIT TRAINING THERAPY: CPT

## 2022-11-30 RX ADMIN — Medication 1 TABLET: at 08:07

## 2022-11-30 RX ADMIN — HYDRALAZINE HYDROCHLORIDE 5 MG: 25 TABLET, FILM COATED ORAL at 22:30

## 2022-11-30 RX ADMIN — APIXABAN 5 MG: 5 TABLET, FILM COATED ORAL at 08:07

## 2022-11-30 RX ADMIN — PANTOPRAZOLE SODIUM 40 MG: 40 TABLET, DELAYED RELEASE ORAL at 06:37

## 2022-11-30 RX ADMIN — Medication 10 ML: at 22:33

## 2022-11-30 RX ADMIN — TAMSULOSIN HYDROCHLORIDE 0.4 MG: 0.4 CAPSULE ORAL at 08:07

## 2022-11-30 RX ADMIN — METOPROLOL TARTRATE 25 MG: 25 TABLET, FILM COATED ORAL at 08:07

## 2022-11-30 RX ADMIN — HYDRALAZINE HYDROCHLORIDE 5 MG: 25 TABLET, FILM COATED ORAL at 06:37

## 2022-11-30 RX ADMIN — APIXABAN 5 MG: 5 TABLET, FILM COATED ORAL at 22:30

## 2022-11-30 RX ADMIN — HYDRALAZINE HYDROCHLORIDE 5 MG: 25 TABLET, FILM COATED ORAL at 14:00

## 2022-11-30 RX ADMIN — Medication 10 MG: at 22:32

## 2022-11-30 RX ADMIN — Medication 10 ML: at 08:07

## 2022-11-30 RX ADMIN — METOPROLOL TARTRATE 25 MG: 25 TABLET, FILM COATED ORAL at 22:31

## 2022-11-30 RX ADMIN — ESCITALOPRAM 10 MG: 10 TABLET, FILM COATED ORAL at 08:07

## 2022-11-30 RX ADMIN — FUROSEMIDE 40 MG: 40 TABLET ORAL at 17:11

## 2022-11-30 RX ADMIN — FUROSEMIDE 40 MG: 40 TABLET ORAL at 08:07

## 2022-11-30 RX ADMIN — GABAPENTIN 300 MG: 300 CAPSULE ORAL at 22:30

## 2022-11-30 NOTE — THERAPY TREATMENT NOTE
Acute Care - Occupational Therapy Treatment Note   [place]     Patient Name: Tre Kinney  : 1932  MRN: [id number]  Today's Date: 2022  Onset of Illness/Injury or Date of Surgery: 10/06/22     Referring Physician: Janneth    Admit Date: 10/5/2022     No diagnosis found.  Patient Active Problem List   Diagnosis   • Acid reflux   • Ankle arthralgia   • Cardiac conduction disorder   • Closed fracture of distal fibula   • Arteriosclerosis of coronary artery   • Fracture of distal end of tibia   • Elevated cholesterol   • BP (high blood pressure)   • Hypertrophic polyarthritis   • OP (osteoporosis)   • Right heart failure (HCC)   • Heart failure, chronic, right-sided (HCC)   • SOB (shortness of breath)   • Palpitations   • CHF (congestive heart failure) (HCC)   • Congestive heart failure (HCC)   • Precordial pain   • Chronic anticoagulation   • Hypokalemia   • Anemia, unspecified type   • Generalized weakness     Past Medical History:   Diagnosis Date   • Anemia    • Arthritis    • CHF (congestive heart failure) ([facility])    • Chronic kidney disease    • Coronary artery disease    • H/O blood clots    • History of transfusion     no reaction    • Hypertension      Past Surgical History:   Procedure Laterality Date   • ABDOMINAL SURGERY     • APPENDECTOMY     • CARDIAC SURGERY     •  SECTION     • COLONOSCOPY N/A 3/7/2018    Procedure: COLONOSCOPY;  Surgeon: Willie Gresham MD;  Location:  [facility];  Service:    • CORONARY ANGIOPLASTY WITH STENT PLACEMENT     • ENDOSCOPY N/A 3/6/2018    Procedure: ESOPHAGOGASTRODUODENOSCOPY;  Surgeon: Willie Gresham MD;  Location:  [facility];  Service:    • PACEMAKER IMPLANTATION      left side    • SKIN BIOPSY     • TUBAL ABDOMINAL LIGATION           OT ASSESSMENT FLOWSHEET (last 12 hours)     OT Evaluation and Treatment     Row Name 22 1431                   OT Time and Intention    Subjective Information no complaints  -KP        Document Type  therapy note (daily note)  -        Mode of Treatment individual therapy;occupational therapy  -        Total Minutes, Occupational Therapy 40  -KP        Patient Effort good  -KP        Symptoms Noted During/After Treatment none  -KP        Comment Patient seen for daily treatment session addressing functional mobility/transfers and general UE strength and activity tolerance needed for daily occupations. Seen from 12:30 to 13:30 with PT to support patient and ensure safety. Son on Facetime throughout session.  -           General Information    Patient Profile Reviewed yes  -        General Observations of Patient Patient agreeable to therapy.  -        Existing Precautions/Restrictions fall  -        Limitations/Impairments safety/cognitive  -           Pain Assessment    Pretreatment Pain Rating 0/10 - no pain  -KP        Posttreatment Pain Rating 0/10 - no pain  -KP           Cognition    Affect/Mental Status (Cognition) [facility]  -        Orientation Status (Cognition) oriented x 4  -        Follows Commands (Cognition) [facility]  -        Personal Safety Interventions gait belt;nonskid shoes/slippers when out of bed  -           Activities of Daily Living    Comment, IADL Assessment/Training Patient taken briefly out of room on unit for orientation due to patient expressing confusion on environment outside of room based on auditory stimuli heard. Face mask donned when out of room to follow COVID protocols  -           Sit-Stand Transfer    Sit-Stand [place] (Transfers) minimum assist (75% patient effort);moderate assist (50% patient effort)  -        Assistive Device (Sit-Stand Transfers) walker, front-wheeled  -        Comment, (Sit-Stand Transfer) performed X3 trials with rest breaks  -           Stand-Sit Transfer    Stand-Sit [place] (Transfers) minimum assist (75% patient effort)  -        Assistive Device (Stand-Sit Transfers) walker, front-wheeled  -           Motor  Skills    Functional Endurance fair plus  -KP           Coping    Observed Emotional State calm;cooperative  -KP        Verbalized Emotional State acceptance  -KP           Positioning and Restraints    Pre-Treatment Position sitting in chair/recliner  -KP        Post Treatment Position chair  -KP        In Chair call light within reach  -KP              User Key  (r) = Recorded By, (t) = Taken By, (c) = Cosigned By    Initials Name Effective Dates    KP Linn Quevedo, OT 06/16/21 -                        OT Recommendation and Plan     Progress Toward Functional Goals (OT): progress toward functional goals is gradual  Plan of Care Review  Plan of Care Reviewed With: patient  Progress: improving  Outcome Evaluation: Patient seen for OT treatment session (9:06 to 9:45). She declined calling son due to patient seen for self care tasks. She performed ADLs from recliner as documented. She is able to perform upper body tasks with set-up, but requires maximal assist with ADLs requiring standing. Continue plan of care.  Plan of Care Reviewed With: patient  Outcome Evaluation: Patient seen for OT treatment session (9:06 to 9:45). She declined calling son due to patient seen for self care tasks. She performed ADLs from recliner as documented. She is able to perform upper body tasks with set-up, but requires maximal assist with ADLs requiring standing. Continue plan of care.     Outcome Measures     Row Name 11/30/22 1442 11/29/22 1427 11/28/22 1401       How much help from another is currently needed...    Putting on and taking off regular lower body clothing? 2  -KP 2  -KP 2  -KP    Bathing (including washing, rinsing, and drying) 2  -KP 2  -KP --    Toileting (which includes using toilet bed pan or urinal) 2  -KP 2  -KP 2  -KP    Putting on and taking off regular upper body clothing 3  -KP 3  -KP 3  -KP    Taking care of personal grooming (such as brushing teeth) 3  -KP 3  -KP 3  -KP    Eating meals 4  -KP 4  -KP 4  -KP     AM-PAC 6 Clicks Score (OT) 16  -KP 16  -KP --          User Key  (r) = Recorded By, (t) = Taken By, (c) = Cosigned By    Initials Name Provider Type    Linn Richardson OT Occupational Therapist                Time Calculation:    Time Calculation- OT     Row Name 11/30/22 1443             Time Calculation- OT    OT Start Time 1250  -KP      OT Stop Time 1330  -KP      OT Time Calculation (min) 40 min  -      Total Timed Code Minutes- OT 40 minute(s)  -      OT Received On 11/30/22  -            User Key  (r) = Recorded By, (t) = Taken By, (c) = Cosigned By    Initials Name Provider Type    Linn Richardson OT Occupational Therapist              Therapy Charges for Today     Code Description Service Date Service Provider Modifiers Qty    79075097307 HC OT THERAPEUTIC ACT EA 15 MIN 11/29/2022 Linn Quevedo OT GO 1    62674446110 HC OT THER PROC EA 15 MIN 11/29/2022 Linn Quevedo OT GO 1    95700349517 HC OT THER PROC EA 15 MIN 11/30/2022 Linn Quevedo OT GO 1               Linn Quevedo OT  11/30/2022   Itraconazole Counseling:  I discussed with the patient the risks of itraconazole including but not limited to liver damage, nausea/vomiting, neuropathy, and severe allergy.  The patient understands that this medication is best absorbed when taken with acidic beverages such as non-diet cola or ginger ale.  The patient understands that monitoring is required including baseline LFTs and repeat LFTs at intervals.  The patient understands that they are to contact us or the primary physician if concerning signs are noted.

## 2022-11-30 NOTE — CASE MANAGEMENT/SOCIAL WORK
Discharge Planning Assessment  [facility]     Patient Name: Tre Kinney  MRN: [id number]  Today's Date: 11/30/2022    Admit Date: 10/5/2022     Discharge Plan     Row Name 11/30/22 1651       Plan    Plan Pt was admitted to swing bed on 10/5/22. Pt's insurance has approved swing bed admission through 12/3/22 on this date per swing bed RN. Pt plans to return home after her insurance stops paying for swing bed program. SS to follow.              Continued Care and Services - Admitted Since 10/5/2022     Destination     Service Provider Request Status Selected Services Address Phone Fax Patient Preferred    [facility] Accepted N/A [address] [place] [address] [phone] --    [facility] Pending - Request Sent N/A [address] [place] [address] [phone] --    [facility] Declined  Does not have a secondary payor source N/A [address] [phone] --            Selected Continued Care - Prior Encounters Includes continued care and service providers with selected services from prior encounters from 7/7/2022 to 11/30/2022    Discharged on 10/5/2022 Admission date: 9/19/2022 - Discharge disposition: Swing Bed    Destination     Service Provider Selected Services Address Phone Fax Patient Preferred    [facility] [address] [facility] [address] [phone] -- --                    LENORE Koch

## 2022-11-30 NOTE — PROGRESS NOTES
[facility] HOSPITALISTS CROSS COVER NOTE    Patient Identification:  Name:  Tre Kinney  Age:  89 y.o.  Sex:  female  :  1932  MRN:  [id number]  Visit number:  [id number]  Primary Care Provider:  Jessica Pedro APRN    Length of stay in inpatient status:  56    Brief Update     I talked to the patient's nurse today, Mitzy Vernon.  No acute issues developed overnight nor today.  The patient is participating with PT and OT and I have reviewed their notes.  The vital signs are at goal and stable.  The last set of labs were on 2022; I will obtain another set tomorrow to ensure that these remain stable.  Over time, we may be able to draw blood work less and less depending on how the numbers trend or how the patient progresses.    Jannet Lagunas MD  [facility]  22  15:09 EST

## 2022-11-30 NOTE — THERAPY TREATMENT NOTE
Acute Care - Physical Therapy Treatment Note   [facility]     Patient Name: Tre Kinney  : 1932  MRN: [id number]  Today's Date: 2022   Onset of Illness/Injury or Date of Surgery: 10/06/22  Visit Dx:   No diagnosis found.  Patient Active Problem List   Diagnosis   • Acid reflux   • Ankle arthralgia   • Cardiac conduction disorder   • Closed fracture of distal fibula   • Arteriosclerosis of coronary artery   • Fracture of distal end of tibia   • Elevated cholesterol   • BP (high blood pressure)   • Hypertrophic polyarthritis   • OP (osteoporosis)   • Right heart failure (HCC)   • Heart failure, chronic, right-sided (HCC)   • SOB (shortness of breath)   • Palpitations   • CHF (congestive heart failure) (HCC)   • Congestive heart failure (HCC)   • Precordial pain   • Chronic anticoagulation   • Hypokalemia   • Anemia, unspecified type   • Generalized weakness     Past Medical History:   Diagnosis Date   • Anemia    • Arthritis    • CHF (congestive heart failure) (HCC)    • Chronic kidney disease    • Coronary artery disease    • H/O blood clots    • History of transfusion     no reaction    • Hypertension      Past Surgical History:   Procedure Laterality Date   • ABDOMINAL SURGERY     • APPENDECTOMY     • CARDIAC SURGERY     •  SECTION     • COLONOSCOPY N/A 3/7/2018    Procedure: COLONOSCOPY;  Surgeon: Willie Gresham MD;  Location:  [facility];  Service:    • CORONARY ANGIOPLASTY WITH STENT PLACEMENT     • ENDOSCOPY N/A 3/6/2018    Procedure: ESOPHAGOGASTRODUODENOSCOPY;  Surgeon: Willie Gresham MD;  Location:  [facility];  Service:    • PACEMAKER IMPLANTATION      left side    • SKIN BIOPSY     • TUBAL ABDOMINAL LIGATION       PT Assessment (last 12 hours)     PT Evaluation and Treatment     Row Name 22 1250          Physical Therapy Time and Intention    Subjective Information complains of;fatigue  -CT     Document Type therapy note (daily note)  -CT     Mode of Treatment  physical therapy  -CT     Patient Effort good  -CT     Comment Patient treatment session [id number]. Son present via facetime. Pt ambulated 3 attempts and performed sitting ther ex. OT presents during treatment to assist with transfers and ambulation.  -CT     Row Name 11/30/22 1250          General Information    Patient Profile Reviewed yes  -CT     Limitations/Impairments safety/cognitive  -CT     Row Name 11/30/22 1250          Cognition    Affect/Mental Status (Cognition) WFL  -CT     Row Name 11/30/22 1250          Bed Mobility    Comment, (Bed Mobility) pt up in chair  -CT     Row Name 11/30/22 1250          Transfers    Transfers sit-stand transfer;stand-sit transfer  -CT     Row Name 11/30/22 1250          Sit-Stand Transfer    Sit-Stand [place] (Transfers) minimum assist (75% patient effort);moderate assist (50% patient effort)  Pt requires more assist with each attempt  -CT     Assistive Device (Sit-Stand Transfers) walker, front-wheeled  -CT     Row Name 11/30/22 1250          Stand-Sit Transfer    Stand-Sit [place] (Transfers) minimum assist (75% patient effort)  -CT     Assistive Device (Stand-Sit Transfers) walker, front-wheeled  -CT     Row Name 11/30/22 1250          Gait/Stairs (Locomotion)    Gait/Stairs Locomotion gait/ambulation assistive device  -CT     [place] Level (Gait) moderate assist (50% patient effort);2 person assist;1 person to manage equipment  -CT     Assistive Device (Gait) walker, front-wheeled  -CT     Distance in Feet (Gait) 10, 10, 6  -CT     Pattern (Gait) step-to  -CT     Deviations/Abnormal Patterns (Gait) stride length decreased;gait speed decreased;festinating/shuffling;weight shifting decreased  -CT     Bilateral Gait Deviations weight shift ability decreased;forward flexed posture;knee buckling bilaterally  -CT     Comment, (Gait/Stairs) 2 attempts at first of session 1 at end  -CT     Row Name 11/30/22 1250          Motor Skills    Therapeutic Exercise --   BLE sitting ther ex: LAQ, AP/HR, glut sets, seated marches 25 reps  -CT     Row Name 11/30/22 1250          Coping    Observed Emotional State pleasant;cooperative  -CT     Verbalized Emotional State acceptance  -CT     Family/Support Persons family  -CT     Row Name 11/30/22 1250          Plan of Care Review    Plan of Care Reviewed With patient  -CT     Row Name 11/30/22 1250          Positioning and Restraints    Pre-Treatment Position sitting in chair/recliner  -CT     Post Treatment Position chair  -CT     In Chair call light within reach  -CT           User Key  (r) = Recorded By, (t) = Taken By, (c) = Cosigned By    Initials Name Provider Type    CT Bhargavi Vásquez PT Physical Therapist                Physical Therapy Education     Title: PT OT SLP Therapies (Done)     Topic: Physical Therapy (Done)     Point: Mobility training (Done)     Learning Progress Summary           Patient Acceptance, E,TB, VU by BC at 11/27/2022 1319    Acceptance, E,TB, VU by RM at 11/20/2022 1449    Acceptance, E,TB, VU by RM at 11/18/2022 1021    Acceptance, E,TB, VU by  at 11/16/2022 1110    Acceptance, E,TB, VU by KK at 11/14/2022 1638    Acceptance, E,TB, VU by KK at 11/13/2022 1648    Acceptance, E,TB, VU by KG at 11/10/2022 1827    Acceptance, E,TB, VU by DM at 11/9/2022 1638    Acceptance, E,TB, VU by KK at 11/8/2022 1754    Acceptance, E,TB, VU by KK at 11/7/2022 1635    Acceptance, E,D, VU,NR by LL at 11/5/2022 1325    Acceptance, E, VU by EB at 11/4/2022 1448    Acceptance, E,TB, VU by KM at 11/4/2022 1101                   Point: Home exercise program (Done)     Learning Progress Summary           Patient Acceptance, E,TB, VU by BC at 11/27/2022 1319    Acceptance, E,TB, VU by RM at 11/20/2022 1449    Acceptance, E,TB, VU by RM at 11/18/2022 1021    Acceptance, E,TB, VU by RH at 11/16/2022 1110    Acceptance, E,TB, VU by KK at 11/14/2022 1638    Acceptance, E,TB, VU by KK at 11/13/2022 1648    Acceptance, E,TB, VU by  KG at 11/10/2022 1827    Acceptance, E,TB, VU by DM at 11/9/2022 1638    Acceptance, E,TB, VU by KK at 11/8/2022 1754    Acceptance, E,TB, VU by KK at 11/7/2022 1635    Acceptance, E,D, VU,NR by LL at 11/5/2022 1325    Acceptance, E, VU by EB at 11/4/2022 1448    Acceptance, E,TB, VU by KM at 11/4/2022 1101                   Point: Body mechanics (Done)     Learning Progress Summary           Patient Acceptance, E,TB, VU by BC at 11/27/2022 1319    Acceptance, E,TB, VU by RM at 11/20/2022 1449    Acceptance, E,TB, VU by RM at 11/18/2022 1021    Acceptance, E,TB, VU by RH at 11/16/2022 1110    Acceptance, E,TB, VU by KK at 11/14/2022 1638    Acceptance, E,TB, VU by KK at 11/13/2022 1648    Acceptance, E,TB, VU by KG at 11/10/2022 1827    Acceptance, E,TB, VU by DM at 11/9/2022 1638    Acceptance, E,TB, VU by KK at 11/8/2022 1754    Acceptance, E,TB, VU by KK at 11/7/2022 1635    Acceptance, E,D, VU,NR by LL at 11/5/2022 1325    Acceptance, E, VU by EB at 11/4/2022 1448    Acceptance, E,TB, VU by KM at 11/4/2022 1101                   Point: Precautions (Done)     Learning Progress Summary           Patient Acceptance, E,TB, VU by BC at 11/27/2022 1319    Acceptance, E,TB, VU by RM at 11/20/2022 1449    Acceptance, E,TB, VU by RM at 11/18/2022 1021    Acceptance, E,TB, VU by RH at 11/16/2022 1110    Acceptance, E,TB, VU by KK at 11/14/2022 1638    Acceptance, E,TB, VU by KK at 11/13/2022 1648    Acceptance, E,TB, VU by KG at 11/10/2022 1827    Acceptance, E,TB, VU by DM at 11/9/2022 1638    Acceptance, E,TB, VU by KK at 11/8/2022 1754    Acceptance, E,TB, VU by KK at 11/7/2022 1635    Acceptance, E,D, VU,NR by LL at 11/5/2022 1325    Acceptance, E, VU by EB at 11/4/2022 1448    Acceptance, E,TB, VU by KM at 11/4/2022 1101                               User Key     Initials Effective Dates Name Provider Type Discipline    LL 05/02/16 -  Neda Garcia PTA Physical Therapist Assistant PT    RM 06/14/22 -  Jimi  Bethany PARHAM, RN Registered Nurse Nurse    DM 06/16/21 -  Moniz, Deborah, RN Registered Nurse Nurse    BC 08/02/22 -  Collett, Brittany, RN Registered Nurse Nurse    KM 05/24/22 -  Mao Vázquez, PT Physical Therapist PT    EB 09/22/22 -  Cedric Oliver, RN Registered Nurse Nurse    KK 06/27/22 -  Chelsi Bolivar, RN Registered Nurse Nurse    KG 09/22/22 -  Verónica Rodriguez, RN Registered Nurse Nurse    RH 08/03/22 -  Jung Russell RN Extern Registered Nurse Nurse              PT Recommendation and Plan  Anticipated Discharge Disposition (PT): home with 24/7 [facility], [facility] nursing [facility]  Planned Therapy Interventions (PT): balance training, bed mobility training, gait training, home exercise program, manual therapy techniques, motor coordination training, neuromuscular re-education, patient/family education, postural re-education, strengthening, transfer training  Therapy Frequency (PT): 5 times/wk (5-6 times/wk)  Progress Summary (PT)  Progress Toward Functional Goals (PT): progress toward functional goals is gradual  Plan of Care Reviewed With: patient  Progress: improving   Outcome Measures     Row Name 11/30/22 1442 11/29/22 1427 11/28/22 1401       How much help from another is currently needed...    Putting on and taking off regular lower body clothing? 2  -KP 2  -KP 2  -KP    Bathing (including washing, rinsing, and drying) 2  -KP 2  -KP --    Toileting (which includes using toilet bed pan or urinal) 2  -KP 2  -KP 2  -KP    Putting on and taking off regular upper body clothing 3  -KP 3  -KP 3  -KP    Taking care of personal grooming (such as brushing teeth) 3  -KP 3  -KP 3  -KP    Eating meals 4  -KP 4  -KP 4  -KP    AM-PAC 6 Clicks Score (OT) 16  -KP 16  -KP --          User Key  (r) = Recorded By, (t) = Taken By, (c) = Cosigned By    Initials Name Provider Type    Linn Richardson, OT Occupational Therapist                 Time Calculation:    PT Charges     Row Name 11/30/22 3876             Time Calculation     Start Time 1250  -CT      Stop Time 1330  -CT      Time Calculation (min) 40 min  -CT      PT Received On 11/30/22  -CT            User Key  (r) = Recorded By, (t) = Taken By, (c) = Cosigned By    Initials Name Provider Type    CT Bhargavi Vásquez, PT Physical Therapist              Therapy Charges for Today     Code Description Service Date Service Provider Modifiers Qty    99994539836 HC GAIT TRAINING EA 15 MIN 11/29/2022 Bhargavi Vásquez, PT GP 1    85893448722 HC GAIT TRAINING EA 15 MIN 11/30/2022 Bhargavi Vásquez, PT GP 1    70884733090 HC PT THER PROC EA 15 MIN 11/30/2022 Bhargavi Vásquez, PT GP 1          PT G-Codes  Outcome Measure Options: AM-PAC 6 Clicks Daily Activity (OT)  AM-PAC 6 Clicks Score (PT): 12  AM-PAC 6 Clicks Score (OT): 16    Bhargavi Vásquez PT  11/30/2022
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
## 2022-11-30 NOTE — PLAN OF CARE
Goal Outcome Evaluation:           Progress: improving  Outcome Evaluation: Patient has done well today, she participated with physical therapy and sat in chair for her meals.  Continued bladder training with patient unable to feel need to void, will continue. Continue current plan of care.

## 2022-12-01 LAB
ANION GAP SERPL CALCULATED.3IONS-SCNC: 11.3 MMOL/L (ref 5–15)
BUN SERPL-MCNC: 35 MG/DL (ref 8–23)
BUN/CREAT SERPL: 18.9 (ref 7–25)
CALCIUM SPEC-SCNC: 8.8 MG/DL (ref 8.6–10.5)
CHLORIDE SERPL-SCNC: 98 MMOL/L (ref 98–107)
CO2 SERPL-SCNC: 26.7 MMOL/L (ref 22–29)
CREAT SERPL-MCNC: 1.85 MG/DL (ref 0.57–1)
DEPRECATED RDW RBC AUTO: 49.6 FL (ref 37–54)
EGFRCR SERPLBLD CKD-EPI 2021: 25.8 ML/MIN/1.73
ERYTHROCYTE [DISTWIDTH] IN BLOOD BY AUTOMATED COUNT: 14.5 % (ref 12.3–15.4)
GLUCOSE SERPL-MCNC: 106 MG/DL (ref 65–99)
HCT VFR BLD AUTO: 30.8 % (ref 34–46.6)
HGB BLD-MCNC: 9.8 G/DL (ref 12–15.9)
MAGNESIUM SERPL-MCNC: 2 MG/DL (ref 1.6–2.4)
MCH RBC QN AUTO: 30 PG (ref 26.6–33)
MCHC RBC AUTO-ENTMCNC: 31.8 G/DL (ref 31.5–35.7)
MCV RBC AUTO: 94.2 FL (ref 79–97)
PHOSPHATE SERPL-MCNC: 3.6 MG/DL (ref 2.5–4.5)
PLATELET # BLD AUTO: 203 10*3/MM3 (ref 140–450)
PMV BLD AUTO: 9.8 FL (ref 6–12)
POTASSIUM SERPL-SCNC: 3.6 MMOL/L (ref 3.5–5.2)
POTASSIUM SERPL-SCNC: 4.9 MMOL/L (ref 3.5–5.2)
RBC # BLD AUTO: 3.27 10*6/MM3 (ref 3.77–5.28)
SODIUM SERPL-SCNC: 136 MMOL/L (ref 136–145)
WBC NRBC COR # BLD: 6.96 10*3/MM3 (ref 3.4–10.8)

## 2022-12-01 PROCEDURE — 97110 THERAPEUTIC EXERCISES: CPT

## 2022-12-01 PROCEDURE — 97530 THERAPEUTIC ACTIVITIES: CPT

## 2022-12-01 PROCEDURE — 84132 ASSAY OF SERUM POTASSIUM: CPT | Performed by: INTERNAL MEDICINE

## 2022-12-01 PROCEDURE — 85027 COMPLETE CBC AUTOMATED: CPT | Performed by: INTERNAL MEDICINE

## 2022-12-01 PROCEDURE — 83735 ASSAY OF MAGNESIUM: CPT | Performed by: INTERNAL MEDICINE

## 2022-12-01 PROCEDURE — 84100 ASSAY OF PHOSPHORUS: CPT | Performed by: INTERNAL MEDICINE

## 2022-12-01 PROCEDURE — 80048 BASIC METABOLIC PNL TOTAL CA: CPT | Performed by: INTERNAL MEDICINE

## 2022-12-01 PROCEDURE — 97535 SELF CARE MNGMENT TRAINING: CPT

## 2022-12-01 PROCEDURE — 97116 GAIT TRAINING THERAPY: CPT

## 2022-12-01 RX ORDER — POTASSIUM CHLORIDE 1.5 G/1.77G
40 POWDER, FOR SOLUTION ORAL EVERY 4 HOURS
Status: COMPLETED | OUTPATIENT
Start: 2022-12-01 | End: 2022-12-01

## 2022-12-01 RX ADMIN — FUROSEMIDE 40 MG: 40 TABLET ORAL at 09:28

## 2022-12-01 RX ADMIN — GABAPENTIN 300 MG: 300 CAPSULE ORAL at 20:03

## 2022-12-01 RX ADMIN — FUROSEMIDE 40 MG: 40 TABLET ORAL at 17:35

## 2022-12-01 RX ADMIN — APIXABAN 5 MG: 5 TABLET, FILM COATED ORAL at 20:02

## 2022-12-01 RX ADMIN — Medication 10 ML: at 20:03

## 2022-12-01 RX ADMIN — METOPROLOL TARTRATE 25 MG: 25 TABLET, FILM COATED ORAL at 20:02

## 2022-12-01 RX ADMIN — POTASSIUM CHLORIDE 40 MEQ: 1.5 POWDER, FOR SOLUTION ORAL at 14:01

## 2022-12-01 RX ADMIN — PANTOPRAZOLE SODIUM 40 MG: 40 TABLET, DELAYED RELEASE ORAL at 06:25

## 2022-12-01 RX ADMIN — ESCITALOPRAM 10 MG: 10 TABLET, FILM COATED ORAL at 09:28

## 2022-12-01 RX ADMIN — TAMSULOSIN HYDROCHLORIDE 0.4 MG: 0.4 CAPSULE ORAL at 09:28

## 2022-12-01 RX ADMIN — Medication 10 MG: at 20:02

## 2022-12-01 RX ADMIN — HYDRALAZINE HYDROCHLORIDE 5 MG: 25 TABLET, FILM COATED ORAL at 14:05

## 2022-12-01 RX ADMIN — HYDRALAZINE HYDROCHLORIDE 5 MG: 25 TABLET, FILM COATED ORAL at 22:03

## 2022-12-01 RX ADMIN — METOPROLOL TARTRATE 25 MG: 25 TABLET, FILM COATED ORAL at 09:28

## 2022-12-01 RX ADMIN — POTASSIUM CHLORIDE 40 MEQ: 1.5 POWDER, FOR SOLUTION ORAL at 10:21

## 2022-12-01 RX ADMIN — HYDRALAZINE HYDROCHLORIDE 5 MG: 25 TABLET, FILM COATED ORAL at 06:25

## 2022-12-01 RX ADMIN — Medication 10 ML: at 09:30

## 2022-12-01 RX ADMIN — APIXABAN 5 MG: 5 TABLET, FILM COATED ORAL at 09:28

## 2022-12-01 RX ADMIN — Medication 1 TABLET: at 09:28

## 2022-12-01 NOTE — PLAN OF CARE
Goal Outcome Evaluation:  Plan of Care Reviewed With: patient        Progress: no change  Outcome Evaluation: pt up in chair, pt has worked with PT today and is in good spirits, no acute changes with pt, bladder training in progress, will continue plan of care

## 2022-12-01 NOTE — PROGRESS NOTES
[facility]    Patient Identification:  Name:  Tre Kinney  Age:  89 y.o.  Sex:  female  :  1932  MRN:  [id number]  Visit number:  [id number]  Primary Care Provider:  Jessica Pedro APRN    Length of stay in inpatient status:  57    Brief Update     Overnight, bedside nurse Verónica states that no events were reported to her during nursing checkout.  The patient was sitting up in the bedside chair when I saw her with bedside nurse Verónica today.  The patient appeared to be in good spirits.  She denied chest pain, trouble breathing, nausea, vomiting, and diarrhea.  She demonstrated to me how she could raise her legs higher and quicker than she could when I last evaluated her 1 month ago.  The patient states that she is eating fairly well and she is not choking on any of her food.  Below are the excerpts from the physical therapy notes for the last few days in addition to a physical therapy note from 1 month ago.  Compared to a month ago, the patient does appear stronger, as she is able to attempt to walk 3 times during a therapy session, when in the past she could only attempt 1 time.  However, over the last few days, the distance that she can walk has not changed significantly.  We will continue with physical therapy via our physical therapy department and via nursing staff assisting the patient with transitions from bed to chair and chair to bed.  We have encouraged the patient to continue with exercising her arms and legs is much as possible while she is sitting in her bed.  I have reviewed the patient's blood work from this morning and compared it to that dated 2022; there is not an appreciable difference between the lab values.  Also, I have reviewed her vital signs, which are stated below.  The patient has normal and stable vital signs for her age and we will continue monitoring these.  Since her labs have been stable for the last 5 days, I do not  anticipate another need for blood work within the next week, unless patient has a change in medical status.      Row Name 11/30/22 1250                  Gait/Stairs (Locomotion)     Gait/Stairs Locomotion gait/ambulation assistive device  -CT       [place] Level (Gait) moderate assist (50% patient effort);2 person assist;1 person to manage equipment  -CT       Assistive Device (Gait) walker, front-wheeled  -CT       Distance in Feet (Gait) 10, 10, 6  -CT       Pattern (Gait) step-to  -CT       Deviations/Abnormal Patterns (Gait) stride length decreased;gait speed decreased;festinating/shuffling;weight shifting decreased  -CT       Bilateral Gait Deviations weight shift ability decreased;forward flexed posture;knee buckling bilaterally  -CT       Comment, (Gait/Stairs) 2 attempts at first of session 1 at end  -CT       Row Name 11/29/22 1333                  Gait/Stairs (Locomotion)     Gait/Stairs Locomotion gait/ambulation assistive device  -CT       [place] Level (Gait) moderate assist (50% patient effort);2 person assist  -CT       Assistive Device (Gait) walker, front-wheeled  -CT       Distance in Feet (Gait) 5, 8, 10  -CT       Pattern (Gait) step-to  -CT       Deviations/Abnormal Patterns (Gait) stride length decreased;gait speed decreased;festinating/shuffling;weight shifting decreased  -CT       Bilateral Gait Deviations weight shift ability decreased;forward flexed posture;knee buckling bilaterally  -CT       Row Name 11/28/22 1250                  Gait/Stairs (Locomotion)     Gait/Stairs Locomotion gait/ambulation assistive device  -CT       [place] Level (Gait) moderate assist (50% patient effort);2 person assist  -CT       Assistive Device (Gait) walker, front-wheeled  -CT       Distance in Feet (Gait) 4, 6, 3  3 trials  -CT       Pattern (Gait) step-to  -CT       Deviations/Abnormal Patterns (Gait) stride length decreased;gait speed decreased;festinating/shuffling;weight shifting  decreased  -CT       Bilateral Gait Deviations weight shift ability decreased;forward flexed posture;knee buckling bilaterally  -CT      Row Name 11/26/22 1403                  Gait/Stairs (Locomotion)     Gait/Stairs Locomotion gait/ambulation assistive device  -CT       [place] Level (Gait) moderate assist (50% patient effort);2 person assist  -CT       Assistive Device (Gait) walker, front-wheeled  -CT       Distance in Feet (Gait) 7 ft x 3  -CT       Pattern (Gait) step-to  -CT       Deviations/Abnormal Patterns (Gait) stride length decreased;gait speed decreased;festinating/shuffling;weight shifting decreased  -CT       Bilateral Gait Deviations weight shift ability decreased;forward flexed posture;knee buckling bilaterally  -CT      Row Name 11/25/22 1100                  Gait/Stairs (Locomotion)     Gait/Stairs Locomotion gait/ambulation assistive device  -KH       [place] Level (Gait) moderate assist (50% patient effort);maximum assist (25% patient effort);2 person assist  -KH       Assistive Device (Gait) walker, front-wheeled  -KH       Distance in Feet (Gait) Approximately 1' from chair total  -KH       Comment, (Gait/Stairs) LE squatting with posterior leaning  -KH      Row Name 11/01/22 1001                  Gait/Stairs (Locomotion)     [place] Level (Gait) minimum assist (75% patient effort);moderate assist (50% patient effort);2 person assist;verbal cues;nonverbal cues (demo/gesture)  constant verbal cueing to remain upright  -CT       Assistive Device (Gait) walker, front-wheeled  -CT       Distance in Feet (Gait) 5  -CT       Pattern (Gait) step-to  -CT       Deviations/Abnormal Patterns (Gait) gait speed decreased;festinating/shuffling;weight shifting decreased  -CT       Bilateral Gait Deviations weight shift ability decreased;forward flexed posture;knee buckling bilaterally  -CT       Comment, (Gait/Stairs) pt was unable to ambulate a second time  -CT         Vital Signs  Temp:   [97.9 °F (36.6 °C)-98.2 °F (36.8 °C)] 97.9 °F (36.6 °C)  Heart Rate:  [77-89] 77  Resp:  [16-18] 16  BP: (136-164)/(45-67) 164/45     on   ;   Device (Oxygen Therapy): room air  Body mass index is 26.02 kg/m².    Intake/Output Summary (Last 24 hours) at 12/1/2022 0745  Last data filed at 12/1/2022 0600  Gross per 24 hour   Intake 840 ml   Output 2100 ml   Net -1260 ml     Wt Readings from Last 3 Encounters:   12/01/22 75.4 kg (166 lb 3.2 oz)   10/05/22 76.6 kg (168 lb 14.4 oz)   09/08/22 76.7 kg (169 lb)       LABS:    CBC and coagulation:  Results from last 7 days   Lab Units 12/01/22 0102 11/25/22  0233   WBC 10*3/mm3 6.96 6.00   HEMOGLOBIN g/dL 9.8* 9.5*   HEMATOCRIT % 30.8* 31.1*   MCV fL 94.2 96.9   MCHC g/dL 31.8 30.5*   PLATELETS 10*3/mm3 203 203     Renal and electrolytes:  Results from last 7 days   Lab Units 12/01/22 0103 11/25/22  0234   SODIUM mmol/L 136 137   POTASSIUM mmol/L 3.6 4.1   MAGNESIUM mg/dL 2.0  --    CHLORIDE mmol/L 98 99   CO2 mmol/L 26.7 26.2   BUN mg/dL 35* 34*   CREATININE mg/dL 1.85* 1.80*   CALCIUM mg/dL 8.8 9.1   PHOSPHORUS mg/dL 3.6  --    GLUCOSE mg/dL 106* 103*     Estimated Creatinine Clearance: 21.8 mL/min (A) (by C-G formula based on SCr of 1.85 mg/dL (H)).    Liver and pancreatic function:  Results from last 7 days   Lab Units 11/25/22  0234   ALBUMIN g/dL 2.67*   BILIRUBIN mg/dL 0.2   ALK PHOS U/L 43   AST (SGOT) U/L 9   ALT (SGPT) U/L 5       Jannet Lagunas MD  [facility]  12/01/22  07:43 EST

## 2022-12-01 NOTE — PLAN OF CARE
Goal Outcome Evaluation:         Patient resting comfortably in bed; she has a restless night and was finally able to get some sleep. Bladder training is in progress. Will continue to monitor.

## 2022-12-01 NOTE — THERAPY TREATMENT NOTE
Acute Care - Occupational Therapy Treatment Note   [place]     Patient Name: Tre Kinney  : 1932  MRN: [id number]  Today's Date: 2022  Onset of Illness/Injury or Date of Surgery: 10/06/22     Referring Physician: Janneth    Admit Date: 10/5/2022     No diagnosis found.  Patient Active Problem List   Diagnosis   • Acid reflux   • Ankle arthralgia   • Cardiac conduction disorder   • Closed fracture of distal fibula   • Arteriosclerosis of coronary artery   • Fracture of distal end of tibia   • Elevated cholesterol   • BP (high blood pressure)   • Hypertrophic polyarthritis   • OP (osteoporosis)   • Right heart failure (HCC)   • Heart failure, chronic, right-sided (HCC)   • SOB (shortness of breath)   • Palpitations   • CHF (congestive heart failure) (HCC)   • Congestive heart failure (HCC)   • Precordial pain   • Chronic anticoagulation   • Hypokalemia   • Anemia, unspecified type   • Generalized weakness     Past Medical History:   Diagnosis Date   • Anemia    • Arthritis    • CHF (congestive heart failure) ([facility])    • Chronic kidney disease    • Coronary artery disease    • H/O blood clots    • History of transfusion     no reaction    • Hypertension      Past Surgical History:   Procedure Laterality Date   • ABDOMINAL SURGERY     • APPENDECTOMY     • CARDIAC SURGERY     •  SECTION     • COLONOSCOPY N/A 3/7/2018    Procedure: COLONOSCOPY;  Surgeon: Willie Gresham MD;  Location:  [facility];  Service:    • CORONARY ANGIOPLASTY WITH STENT PLACEMENT     • ENDOSCOPY N/A 3/6/2018    Procedure: ESOPHAGOGASTRODUODENOSCOPY;  Surgeon: Willie Gresham MD;  Location:  [facility];  Service:    • PACEMAKER IMPLANTATION      left side    • SKIN BIOPSY     • TUBAL ABDOMINAL LIGATION           OT ASSESSMENT FLOWSHEET (last 12 hours)     OT Evaluation and Treatment     Row Name 22 1345                   OT Time and Intention    Document Type therapy note (daily note)  -KR        Mode of  Treatment occupational therapy  -KR        Patient Effort good  -KR        Comment Pt seen today for continued UE ther ex, mobility training. Noted improvement throughout session. General endurance improved for UE horizontal/vertical press from seated in chair. Sit/stand x 3 with CGA. Facetime communication achieved with sonWillian. Near end of OT session, sonLeno arrived for in-person visit. Pt son and PT remained following session.  -KR           General Information    General Observations of Patient cooperative, motivated  -KR           Cognition    Affect/Mental Status (Cognition) WFL  -KR        Orientation Status (Cognition) oriented x 3  -KR        Follows Commands (Cognition) WFL  -KR           Sit-Stand Transfer    Sit-Stand [place] (Transfers) contact guard  -KR        Assistive Device (Sit-Stand Transfers) walker, front-wheeled  -KR           Shoulder (Therapeutic Exercise)    Shoulder AROM (Therapeutic Exercise) bilateral;flexion;extension;aBduction;aDduction;sitting;10 repetitions;3 sets  -KR        Shoulder Wand Exercises (Therapeutic Exercise) flexion;extension;aBduction;aDduction;sitting;10 repetitions;3 sets  -KR           Elbow/Forearm (Therapeutic Exercise)    Elbow/Forearm (Therapeutic Exercise) AROM (active range of motion)  -KR        Elbow/Forearm AROM (Therapeutic Exercise) bilateral;flexion;extension;sitting  -KR           Plan of Care Review    Plan of Care Reviewed With patient;son  -KR              User Key  (r) = Recorded By, (t) = Taken By, (c) = Cosigned By    Initials Name Effective Dates    KR Andres Uriarte, OT 06/16/21 -                        OT Recommendation and Plan     Progress Toward Functional Goals (OT): progress toward functional goals is gradual  Plan of Care Review  Plan of Care Reviewed With: patient, son  Progress: improving  Plan of Care Reviewed With: patient, son     Outcome Measures     Row Name 12/01/22 1610 11/30/22 1442 11/29/22 1427       How much help  from another is currently needed...    Putting on and taking off regular lower body clothing? 2  -KR 2  -KP 2  -KP    Bathing (including washing, rinsing, and drying) 2  -KR 2  -KP 2  -KP    Toileting (which includes using toilet bed pan or urinal) 2  -KR 2  -KP 2  -KP    Putting on and taking off regular upper body clothing 3  -KR 3  -KP 3  -KP    Taking care of personal grooming (such as brushing teeth) 3  -KR 3  -KP 3  -KP    Eating meals 4  -KR 4  -KP 4  -KP    AM-PAC 6 Clicks Score (OT) 16  -KR 16  -KP 16  -KP          User Key  (r) = Recorded By, (t) = Taken By, (c) = Cosigned By    Initials Name Provider Type    Andres Ayala OT Occupational Therapist    Linn Richardson OT Occupational Therapist                Time Calculation:    Time Calculation- OT     Row Name 12/01/22 1611             Time Calculation- OT    Total Timed Code Minutes- OT 30 minute(s)  -KARLO            User Key  (r) = Recorded By, (t) = Taken By, (c) = Cosigned By    Initials Name Provider Type    Andres Ayala OT Occupational Therapist              Therapy Charges for Today     Code Description Service Date Service Provider Modifiers Qty    72358589175  OT THERAPEUTIC ACT EA 15 MIN 12/1/2022 Andres Uriarte OT GO 1    [id number]  OT SELF CARE/MGMT/TRAIN EA 15 MIN 12/1/2022 Andres Uriarte OT GO 1               Andres Uriarte OT  12/1/2022

## 2022-12-01 NOTE — THERAPY TREATMENT NOTE
Acute Care - Physical Therapy Initial Evaluation   [facility]     Patient Name: Tre Kinney  : 1932  MRN: [id number]  Today's Date: 2022   Onset of Illness/Injury or Date of Surgery: 10/06/22  Visit Dx:   No diagnosis found.  Patient Active Problem List   Diagnosis   • Acid reflux   • Ankle arthralgia   • Cardiac conduction disorder   • Closed fracture of distal fibula   • Arteriosclerosis of coronary artery   • Fracture of distal end of tibia   • Elevated cholesterol   • BP (high blood pressure)   • Hypertrophic polyarthritis   • OP (osteoporosis)   • Right heart failure (HCC)   • Heart failure, chronic, right-sided (HCC)   • SOB (shortness of breath)   • Palpitations   • CHF (congestive heart failure) (HCC)   • Congestive heart failure (HCC)   • Precordial pain   • Chronic anticoagulation   • Hypokalemia   • Anemia, unspecified type   • Generalized weakness     Past Medical History:   Diagnosis Date   • Anemia    • Arthritis    • CHF (congestive heart failure) (HCC)    • Chronic kidney disease    • Coronary artery disease    • H/O blood clots    • History of transfusion     no reaction    • Hypertension      Past Surgical History:   Procedure Laterality Date   • ABDOMINAL SURGERY     • APPENDECTOMY     • CARDIAC SURGERY     •  SECTION     • COLONOSCOPY N/A 3/7/2018    Procedure: COLONOSCOPY;  Surgeon: Willie Gresham MD;  Location:  [facility];  Service:    • CORONARY ANGIOPLASTY WITH STENT PLACEMENT     • ENDOSCOPY N/A 3/6/2018    Procedure: ESOPHAGOGASTRODUODENOSCOPY;  Surgeon: Willie Gresham MD;  Location:  [facility];  Service:    • PACEMAKER IMPLANTATION      left side    • SKIN BIOPSY     • TUBAL ABDOMINAL LIGATION       PT Assessment (last 12 hours)     PT Evaluation and Treatment     Row Name 22 1350          Physical Therapy Time and Intention    Document Type therapy note (daily note)  -KH     Mode of Treatment physical therapy  -KH     Patient Effort good  -KH      Comment Pt seen for treatment this date with great ambulation progress since last treatment by this PT. Pt willing to work with LE TE and transferred back to reclining chair.  -     Row Name 12/01/22 1350          Living Environment    Current Living Arrangements --  -     Row Name 12/01/22 1350          Bed Mobility    Comment, (Bed Mobility) Not observed as pt was sitting in chair.  -     Row Name 12/01/22 1350          Transfers    Transfers sit-stand transfer;stand-sit transfer  -     Comment, (Transfers) STSx3 this date  -     Row Name 12/01/22 1350          Sit-Stand Transfer    Sit-Stand [place] (Transfers) contact guard;moderate assist (50% patient effort);2 person assist  -     Assistive Device (Sit-Stand Transfers) walker, front-wheeled  -     Comment, (Sit-Stand Transfer) Pt given varying assists 2/2 weakness however improved since this PT saw pt last  -     Row Name 12/01/22 1350          Stand-Sit Transfer    Stand-Sit [place] (Transfers) contact guard;minimum assist (75% patient effort);maximum assist (25% patient effort)  -     Assistive Device (Stand-Sit Transfers) walker, front-wheeled  -     Comment, (Stand-Sit Transfer) MaxA given d/t pt LE giving way and pt was assisted for safe sitting.  -     Row Name 12/01/22 1350          Gait/Stairs (Locomotion)    Gait/Stairs Locomotion gait/ambulation assistive device  -     [place] Level (Gait) moderate assist (50% patient effort);2 person assist  -     Assistive Device (Gait) walker, front-wheeled  -     Distance in Feet (Gait) Approximate total of 10-12'  -     Row Name 12/01/22 1350          Motor Skills    Therapeutic Exercise hip;knee  marches in sitting, glute sets, LAQ R LE with yellow theraband & L LE with red theraband  -     Row Name 12/01/22 1350          Positioning and Restraints    Pre-Treatment Position sitting in chair/recliner  -     Post Treatment Position chair  -     In Chair  sitting;call light within reach;with family/caregiver  -           User Key  (r) = Recorded By, (t) = Taken By, (c) = Cosigned By    Initials Name Provider Type    Radah Yusuf, PT Physical Therapist                Physical Therapy Education     Title: PT OT SLP Therapies (Done)     Topic: Physical Therapy (Done)     Point: Mobility training (Done)     Learning Progress Summary           Patient Acceptance, E,TB, VU by BC at 11/27/2022 1319    Acceptance, E,TB, VU by RM at 11/20/2022 1449    Acceptance, E,TB, VU by RM at 11/18/2022 1021    Acceptance, E,TB, VU by RH at 11/16/2022 1110    Acceptance, E,TB, VU by KK at 11/14/2022 1638    Acceptance, E,TB, VU by KK at 11/13/2022 1648    Acceptance, E,TB, VU by KG at 11/10/2022 1827    Acceptance, E,TB, VU by DM at 11/9/2022 1638    Acceptance, E,TB, VU by KK at 11/8/2022 1754    Acceptance, E,TB, VU by KK at 11/7/2022 1635    Acceptance, E,D, VU,NR by LL at 11/5/2022 1325    Acceptance, E, VU by EB at 11/4/2022 1448    Acceptance, E,TB, VU by KM at 11/4/2022 1101                   Point: Home exercise program (Done)     Learning Progress Summary           Patient Acceptance, E,TB, VU by BC at 11/27/2022 1319    Acceptance, E,TB, VU by RM at 11/20/2022 1449    Acceptance, E,TB, VU by RM at 11/18/2022 1021    Acceptance, E,TB, VU by RH at 11/16/2022 1110    Acceptance, E,TB, VU by KK at 11/14/2022 1638    Acceptance, E,TB, VU by KK at 11/13/2022 1648    Acceptance, E,TB, VU by KG at 11/10/2022 1827    Acceptance, E,TB, VU by DM at 11/9/2022 1638    Acceptance, E,TB, VU by KK at 11/8/2022 1754    Acceptance, E,TB, VU by KK at 11/7/2022 1635    Acceptance, E,D, VU,NR by LL at 11/5/2022 1325    Acceptance, E, VU by EB at 11/4/2022 1448    Acceptance, E,TB, VU by KM at 11/4/2022 1101                   Point: Body mechanics (Done)     Learning Progress Summary           Patient Acceptance, E,TB, VU by BC at 11/27/2022 1319    Acceptance, E,TB, VU by RM at 11/20/2022  1449    Acceptance, E,TB, VU by RM at 11/18/2022 1021    Acceptance, E,TB, VU by RH at 11/16/2022 1110    Acceptance, E,TB, VU by KK at 11/14/2022 1638    Acceptance, E,TB, VU by KK at 11/13/2022 1648    Acceptance, E,TB, VU by KG at 11/10/2022 1827    Acceptance, E,TB, VU by DM at 11/9/2022 1638    Acceptance, E,TB, VU by KK at 11/8/2022 1754    Acceptance, E,TB, VU by KK at 11/7/2022 1635    Acceptance, E,D, VU,NR by LL at 11/5/2022 1325    Acceptance, E, VU by EB at 11/4/2022 1448    Acceptance, E,TB, VU by KM at 11/4/2022 1101                   Point: Precautions (Done)     Learning Progress Summary           Patient Acceptance, E,TB, VU by BC at 11/27/2022 1319    Acceptance, E,TB, VU by RM at 11/20/2022 1449    Acceptance, E,TB, VU by RM at 11/18/2022 1021    Acceptance, E,TB, VU by RH at 11/16/2022 1110    Acceptance, E,TB, VU by KK at 11/14/2022 1638    Acceptance, E,TB, VU by KK at 11/13/2022 1648    Acceptance, E,TB, VU by KG at 11/10/2022 1827    Acceptance, E,TB, VU by DM at 11/9/2022 1638    Acceptance, E,TB, VU by KK at 11/8/2022 1754    Acceptance, E,TB, VU by KK at 11/7/2022 1635    Acceptance, E,D, VU,NR by LL at 11/5/2022 1325    Acceptance, E, VU by EB at 11/4/2022 1448    Acceptance, E,TB, VU by KM at 11/4/2022 1101                               User Key     Initials Effective Dates Name Provider Type Discipline    LL 05/02/16 -  Neda Garcia PTA Physical Therapist Assistant PT    RM 06/14/22 -  Bethany Krause RN Registered Nurse Nurse    DM 06/16/21 -  Moniz, Deborah, RN Registered Nurse Nurse    BC 08/02/22 -  Collett, Brittany, RN Registered Nurse Nurse    KM 05/24/22 -  Mao Vázquez, PT Physical Therapist PT    EB 09/22/22 -  Cedric Oliver, RN Registered Nurse Nurse    KK 06/27/22 -  Chelsi Bolivar, RN Registered Nurse Nurse    KG 09/22/22 -  Verónica Rodriguez, RN Registered Nurse Nurse     08/03/22 -  Jung Russell, RN [facility] Registered Nurse Nurse              PT Recommendation and  Plan     Outcome Evaluation: Pt was able to take steps this date with RW and approximately modAx2. Pt appears in better spirits today than when this PT saw her last.   Outcome Measures     Row Name 11/30/22 1442 11/29/22 1427          How much help from another is currently needed...    Putting on and taking off regular lower body clothing? 2  -KP 2  -KP     Bathing (including washing, rinsing, and drying) 2  -KP 2  -KP     Toileting (which includes using toilet bed pan or urinal) 2  -KP 2  -KP     Putting on and taking off regular upper body clothing 3  -KP 3  -KP     Taking care of personal grooming (such as brushing teeth) 3  -KP 3  -KP     Eating meals 4  -KP 4  -KP     AM-PAC 6 Clicks Score (OT) 16  -KP 16  -KP           User Key  (r) = Recorded By, (t) = Taken By, (c) = Cosigned By    Initials Name Provider Type    Linn Richardson, OT Occupational Therapist                 Time Calculation:    PT Charges     Row Name 12/01/22 1535             Time Calculation    Start Time 1350  -KH      PT Received On 12/01/22  -         Time Calculation- PT    Total Timed Code Minutes- PT 38 minute(s)  -            User Key  (r) = Recorded By, (t) = Taken By, (c) = Cosigned By    Initials Name Provider Type    Radha Yusuf, EDEL Physical Therapist              Therapy Charges for Today     Code Description Service Date Service Provider Modifiers Qty    13949913563 HC PT THER PROC EA 15 MIN 12/1/2022 Radha Vu, PT GP 1    69952038426 HC PT THERAPEUTIC ACT EA 15 MIN 12/1/2022 Radha Vu, PT GP 1    69688948898 HC GAIT TRAINING EA 15 MIN 12/1/2022 Radha Vu, PT GP 1          PT G-Codes  Outcome Measure Options: AM-PAC 6 Clicks Daily Activity (OT)  AM-PAC 6 Clicks Score (PT): 11  AM-PAC 6 Clicks Score (OT): 16    Radha Vu PT  12/1/2022

## 2022-12-02 PROCEDURE — 97110 THERAPEUTIC EXERCISES: CPT

## 2022-12-02 PROCEDURE — 97116 GAIT TRAINING THERAPY: CPT

## 2022-12-02 PROCEDURE — 97530 THERAPEUTIC ACTIVITIES: CPT

## 2022-12-02 RX ADMIN — GABAPENTIN 300 MG: 300 CAPSULE ORAL at 20:35

## 2022-12-02 RX ADMIN — Medication 10 ML: at 08:59

## 2022-12-02 RX ADMIN — PANTOPRAZOLE SODIUM 40 MG: 40 TABLET, DELAYED RELEASE ORAL at 05:11

## 2022-12-02 RX ADMIN — FUROSEMIDE 40 MG: 40 TABLET ORAL at 08:58

## 2022-12-02 RX ADMIN — APIXABAN 5 MG: 5 TABLET, FILM COATED ORAL at 08:58

## 2022-12-02 RX ADMIN — HYDRALAZINE HYDROCHLORIDE 5 MG: 25 TABLET, FILM COATED ORAL at 14:29

## 2022-12-02 RX ADMIN — Medication 10 ML: at 20:35

## 2022-12-02 RX ADMIN — APIXABAN 5 MG: 5 TABLET, FILM COATED ORAL at 20:35

## 2022-12-02 RX ADMIN — Medication 10 ML: at 09:01

## 2022-12-02 RX ADMIN — TAMSULOSIN HYDROCHLORIDE 0.4 MG: 0.4 CAPSULE ORAL at 08:58

## 2022-12-02 RX ADMIN — Medication 10 ML: at 20:34

## 2022-12-02 RX ADMIN — Medication 10 MG: at 20:35

## 2022-12-02 RX ADMIN — METOPROLOL TARTRATE 25 MG: 25 TABLET, FILM COATED ORAL at 08:57

## 2022-12-02 RX ADMIN — METOPROLOL TARTRATE 25 MG: 25 TABLET, FILM COATED ORAL at 20:35

## 2022-12-02 RX ADMIN — ESCITALOPRAM 10 MG: 10 TABLET, FILM COATED ORAL at 08:58

## 2022-12-02 RX ADMIN — FUROSEMIDE 40 MG: 40 TABLET ORAL at 17:28

## 2022-12-02 RX ADMIN — HYDRALAZINE HYDROCHLORIDE 5 MG: 25 TABLET, FILM COATED ORAL at 21:18

## 2022-12-02 RX ADMIN — HYDRALAZINE HYDROCHLORIDE 5 MG: 25 TABLET, FILM COATED ORAL at 05:11

## 2022-12-02 RX ADMIN — Medication 1 TABLET: at 08:58

## 2022-12-02 NOTE — THERAPY TREATMENT NOTE
Acute Care - Occupational Therapy Treatment Note   [place]     Patient Name: Tre Kinney  : 1932  MRN: [id number]  Today's Date: 2022  Onset of Illness/Injury or Date of Surgery: 10/06/22     Referring Physician: Janneth    Admit Date: 10/5/2022     No diagnosis found.  Patient Active Problem List   Diagnosis   • Acid reflux   • Ankle arthralgia   • Cardiac conduction disorder   • Closed fracture of distal fibula   • Arteriosclerosis of coronary artery   • Fracture of distal end of tibia   • Elevated cholesterol   • BP (high blood pressure)   • Hypertrophic polyarthritis   • OP (osteoporosis)   • Right heart failure (HCC)   • Heart failure, chronic, right-sided (HCC)   • SOB (shortness of breath)   • Palpitations   • CHF (congestive heart failure) (HCC)   • Congestive heart failure (HCC)   • Precordial pain   • Chronic anticoagulation   • Hypokalemia   • Anemia, unspecified type   • Generalized weakness     Past Medical History:   Diagnosis Date   • Anemia    • Arthritis    • CHF (congestive heart failure) ([facility])    • Chronic kidney disease    • Coronary artery disease    • H/O blood clots    • History of transfusion     no reaction    • Hypertension      Past Surgical History:   Procedure Laterality Date   • ABDOMINAL SURGERY     • APPENDECTOMY     • CARDIAC SURGERY     •  SECTION     • COLONOSCOPY N/A 3/7/2018    Procedure: COLONOSCOPY;  Surgeon: Willie Gresham MD;  Location:  [facility];  Service:    • CORONARY ANGIOPLASTY WITH STENT PLACEMENT     • ENDOSCOPY N/A 3/6/2018    Procedure: ESOPHAGOGASTRODUODENOSCOPY;  Surgeon: Willie Gresham MD;  Location:  [facility];  Service:    • PACEMAKER IMPLANTATION      left side    • SKIN BIOPSY     • TUBAL ABDOMINAL LIGATION           OT ASSESSMENT FLOWSHEET (last 12 hours)     OT Evaluation and Treatment     Row Name 22 1608                   OT Time and Intention    Document Type therapy note (daily note)  -KR        Mode of  Treatment occupational therapy  -KR        Patient Effort good  -KR        Comment Pt continues to display improved activity tolerance and remains motivated to improve fxl status/endurance. Pt seen today for continued UE strength/endurance training from seated position. Facetime communication discontinued with sonWillian, following PT session and prior to OT session. Pt sonLeno arrived for in-person visit just prior to end of OT UE ther ex session. Fxl improvement discussed with pt and family  -KR           General Information    General Observations of Patient alert/cooperative  -KR           Sit-Stand Transfer    Sit-Stand [place] (Transfers) contact guard  -KR        Assistive Device (Sit-Stand Transfers) walker, front-wheeled  -KR           Shoulder (Therapeutic Exercise)    Shoulder AROM (Therapeutic Exercise) bilateral;flexion;extension;aBduction;aDduction;10 repetitions;3 sets  -KR           Elbow/Forearm (Therapeutic Exercise)    Elbow/Forearm AROM (Therapeutic Exercise) bilateral;flexion;10 repetitions;3 sets  -KR           Hand (Therapeutic Exercise)    Hand Strengthening (Therapeutic Exercise) bilateral; strengthening  -KR              User Key  (r) = Recorded By, (t) = Taken By, (c) = Cosigned By    Initials Name Effective Dates    KR Andres Uriarte, OT 06/16/21 -                        OT Recommendation and Plan     Progress Toward Functional Goals (OT): progress toward functional goals is gradual  Plan of Care Review  Plan of Care Reviewed With: patient, son  Progress: improving  Plan of Care Reviewed With: patient, son     Outcome Measures     Row Name 12/02/22 1621 12/01/22 1610 11/30/22 1442       How much help from another is currently needed...    Putting on and taking off regular lower body clothing? 2  -KR 2  -KR 2  -KP    Bathing (including washing, rinsing, and drying) 2  -KR 2  -KR 2  -KP    Toileting (which includes using toilet bed pan or urinal) 2  -KR 2  -KR 2  -KP    Putting on  and taking off regular upper body clothing 3  -KR 3  -KR 3  -KP    Taking care of personal grooming (such as brushing teeth) 3  -KR 3  -KR 3  -KP    Eating meals 4  -KR 4  -KR 4  -KP    AM-PAC 6 Clicks Score (OT) 16  -KR 16  -KR 16  -KP          User Key  (r) = Recorded By, (t) = Taken By, (c) = Cosigned By    Initials Name Provider Type    Andres Ayala OT Occupational Therapist    Linn Richardson OT Occupational Therapist                Time Calculation:    Time Calculation- OT     Row Name 12/02/22 1620             Time Calculation- OT    Total Timed Code Minutes- OT 30 minute(s)  -KR            User Key  (r) = Recorded By, (t) = Taken By, (c) = Cosigned By    Initials Name Provider Type    Andres Ayala OT Occupational Therapist              Therapy Charges for Today     Code Description Service Date Service Provider Modifiers Qty    97270748958 HC OT THERAPEUTIC ACT EA 15 MIN 12/1/2022 Andres Uriarte OT GO 1    33257822387 HC OT SELF CARE/MGMT/TRAIN EA 15 MIN 12/1/2022 Andres Uriarte OT GO 1    22131556990 HC OT THERAPEUTIC ACT EA 15 MIN 12/2/2022 Andres Uriarte OT GO 2               Andres Uriarte OT  12/2/2022

## 2022-12-02 NOTE — PAYOR COMM NOTE
"Michelet Jacques RN  Swing Bed Nurse  [phone] Ex 1003 FAX: [phone]  [email or web address]  [facility]  NPI [id number]  Reference Number [id number]    Patient needs additional PT/OT for mobility training       Tre Kinney (89 y.o. Female)    YOB: 1932  Social Security Number:   Address: [address] [place] [address]  Home Phone: [phone]  MRN: [id number]  Mormonism: Nondenominational  Marital Status:         Admission Date: 10/5/22  Admission Type: Urgent  Admitting Provider: Jannet Lagunas MD  Attending Provider: Jannet Lagunas MD  Department, Room/Bed: [address] 3336/  Discharge Date:   Discharge Disposition:   Discharge Destination:               Attending Provider: Jannet Lagunas MD    Allergies: Adhesive Tape, Xanax [Alprazolam]    Isolation: None   Infection: None   Code Status: No CPR    Ht: 170.2 cm (67.01\")   Wt: 74.8 kg (165 lb)    Admission Cmt: None   Principal Problem: None                Active Insurance as of 10/5/2022     Primary Coverage     Payor Plan Insurance Group Employer/Plan Group    ANTH MEDICARE REPLACEMENT ANTHEM MEDICARE ADVANTAGE [id number]     Payor Plan Address Payor Plan Phone Number Payor Plan Fax Number Effective Dates    [address] [phone]  1/1/2022 - None Entered    [facility] [id number]       Subscriber Name Subscriber Birth Date Member ID       TRE KINNEY 12/9/1932 [id number]                 Emergency Contacts      (Rel.) Home Phone Work Phone Mobile Phone    Valdez Kinney (Son) [phone] None None    Leno Kinney (Son) [phone] None [phone]    Willian Kinney (Son) None None [phone]              Current Facility-Administered Medications   Medication Dose Route Frequency Provider Last Rate Last Admin   • acetaminophen (TYLENOL) tablet 650 mg  650 mg Oral Q6H PRN Mustapha Loredo DO   650 mg at 11/26/22 0906   • apixaban " (ELIQUIS) tablet 5 mg  5 mg Oral Q12H Mustapha Loredo DO   5 mg at 12/01/22 2002   • benzocaine-menthol (DERMOPLAST) 20-0.5 % topical spray   Topical 4x Daily PRN Kennedi Saucedo MD   Given at 11/03/22 0548   • docusate sodium (COLACE) capsule 100 mg  100 mg Oral BID PRN Kennedi Saucedo MD   100 mg at 11/26/22 0909   • escitalopram (LEXAPRO) tablet 10 mg  10 mg Oral Daily Jannet Lagunas MD   10 mg at 12/01/22 0928   • furosemide (LASIX) tablet 40 mg  40 mg Oral BID Jannet Lagunas MD   40 mg at 12/01/22 1735   • gabapentin (NEURONTIN) capsule 300 mg  300 mg Oral Nightly Jannet Lagunas MD   300 mg at 12/01/22 2003   • guaiFENesin-dextromethorphan (ROBITUSSIN DM) 100-10 MG/5ML syrup 5 mL  5 mL Oral Q4H PRN Kennedi Saucedo MD       • hydrALAZINE (APRESOLINE) injection 10 mg  10 mg Intravenous Q6H PRN Mustapha Loredo DO       • hydrALAZINE (APRESOLINE) tablet 5 mg  5 mg Oral Q8H Jannet Lagunas MD   5 mg at 12/02/22 0511   • HYDROcodone-acetaminophen (NORCO) 7.5-325 MG per tablet 1 tablet  1 tablet Oral Q8H PRN Jannet Lagunas MD   1 tablet at 11/24/22 0350   • Magnesium Sulfate 2 gram Bolus, followed by 8 gram infusion (total Mg dose 10 grams)- Mg less than or equal to 1mg/dL  2 g Intravenous PRN Jannet Lagunas MD        Or   • Magnesium Sulfate 2 gram / 50mL Infusion (GIVE X 3 BAGS TO EQUAL 6GM TOTAL DOSE) - Mg 1.1 - 1.5 mg/dl  2 g Intravenous PRN Jannet Lagunas MD        Or   • Magnesium Sulfate 4 gram infusion- Mg 1.6-1.9 mg/dL  4 g Intravenous PRN Jannet Lagunas MD       • melatonin tablet 10 mg  10 mg Oral Nightly Jannet Lagunas MD   10 mg at 12/01/22 2002   • metoprolol tartrate (LOPRESSOR) tablet 25 mg  25 mg Oral Q12H Dante Levine MD   25 mg at 12/01/22 2002   • multivitamin (THERAGRAN) tablet 1 tablet  1 tablet Oral Daily Jannet Lagunas MD   1 tablet at 12/01/22 0928   • ondansetron (ZOFRAN) injection 4 mg  4 mg Intravenous Q6H PRN  Jannet Lagunas MD       • pantoprazole (PROTONIX) EC tablet 40 mg  40 mg Oral Q AM Jannet Lagunas MD   40 mg at 12/02/22 0511   • Pharmacy Consult   Does not apply Continuous PRN Mustapha Loredo DO       • phenol (CHLORASEPTIC) 1.4 % liquid 1 spray  1 spray Mouth/Throat Q2H PRN Jannet Lagunas MD   1 spray at 10/13/22 2056   • polyethylene glycol (MIRALAX) packet 17 g  17 g Oral BID PRN Jannet Lagunas MD       • potassium chloride (K-DUR,KLOR-CON) CR tablet 40 mEq  40 mEq Oral PRN Jannet Lagunas MD        Or   • potassium chloride (KLOR-CON) packet 40 mEq  40 mEq Oral PRN Jannet Lagunas MD        Or   • potassium chloride 10 mEq in 100 mL IVPB  10 mEq Intravenous Q1H PRN Jannet Lagunas MD       • prochlorperazine (COMPAZINE) injection 5 mg  5 mg Intravenous Q6H PRN Jannet Lagunas MD        Or   • prochlorperazine (COMPAZINE) tablet 5 mg  5 mg Oral Q6H PRN Jannet Lagunas MD        Or   • prochlorperazine (COMPAZINE) suppository 25 mg  25 mg Rectal Q12H PRN Jannet Lagunas MD       • promethazine (PHENERGAN) 6.25 mg in sodium chloride 0.9 % 50 mL  6.25 mg Intravenous Q6H PRN Jannet Lagunas MD       • sodium chloride 0.9 % flush 10 mL  10 mL Intravenous PRN Jannet Lagunas MD       • sodium chloride 0.9 % flush 10 mL  10 mL Intravenous Q12H Jannet Lagunas MD   10 mL at 12/01/22 2003   • sodium chloride 0.9 % flush 10 mL  10 mL Intravenous PRN Jannet Lagunas MD       • sodium chloride 0.9 % flush 10 mL  10 mL Intravenous Q12H Jannet Lagunas MD   10 mL at 12/01/22 2003   • sodium chloride 0.9 % flush 10 mL  10 mL Intravenous PRN Jannet Lagunas MD       • tamsulosin (FLOMAX) 24 hr capsule 0.4 mg  0.4 mg Oral Daily Jannet Lagunas MD   0.4 mg at 12/01/22 0928        Physician Progress Notes (last 72 hours)      Jannet Lagunas MD at 12/01/22 0743              [facility]    Patient Identification:  Name:  Tre  Gregoria  Age:  89 y.o.  Sex:  female  :  1932  MRN:  [id number]  Visit number:  [id number]  Primary Care Provider:  Jessica Pedro APRN    Length of stay in inpatient status:  57    Brief Update     Overnight, bedside nurse Verónica states that no events were reported to her during nursing checkout.  The patient was sitting up in the bedside chair when I saw her with bedside nurse Verónica today.  The patient appeared to be in good spirits.  She denied chest pain, trouble breathing, nausea, vomiting, and diarrhea.  She demonstrated to me how she could raise her legs higher and quicker than she could when I last evaluated her 1 month ago.  The patient states that she is eating fairly well and she is not choking on any of her food.  Below are the excerpts from the physical therapy notes for the last few days in addition to a physical therapy note from 1 month ago.  Compared to a month ago, the patient does appear stronger, as she is able to attempt to walk 3 times during a therapy session, when in the past she could only attempt 1 time.  However, over the last few days, the distance that she can walk has not changed significantly.  We will continue with physical therapy via our physical therapy department and via nursing staff assisting the patient with transitions from bed to chair and chair to bed.  We have encouraged the patient to continue with exercising her arms and legs is much as possible while she is sitting in her bed.  I have reviewed the patient's blood work from this morning and compared it to that dated 2022; there is not an appreciable difference between the lab values.  Also, I have reviewed her vital signs, which are stated below.  The patient has normal and stable vital signs for her age and we will continue monitoring these.  Since her labs have been stable for the last 5 days, I do not anticipate another need for blood work within the next week, unless patient has a change in  medical status.      Row Name 11/30/22 1250                  Gait/Stairs (Locomotion)     Gait/Stairs Locomotion gait/ambulation assistive device  -CT       [place] Level (Gait) moderate assist (50% patient effort);2 person assist;1 person to manage equipment  -CT       Assistive Device (Gait) walker, front-wheeled  -CT       Distance in Feet (Gait) 10, 10, 6  -CT       Pattern (Gait) step-to  -CT       Deviations/Abnormal Patterns (Gait) stride length decreased;gait speed decreased;festinating/shuffling;weight shifting decreased  -CT       Bilateral Gait Deviations weight shift ability decreased;forward flexed posture;knee buckling bilaterally  -CT       Comment, (Gait/Stairs) 2 attempts at first of session 1 at end  -CT       Row Name 11/29/22 1333                  Gait/Stairs (Locomotion)     Gait/Stairs Locomotion gait/ambulation assistive device  -CT       [place] Level (Gait) moderate assist (50% patient effort);2 person assist  -CT       Assistive Device (Gait) walker, front-wheeled  -CT       Distance in Feet (Gait) 5, 8, 10  -CT       Pattern (Gait) step-to  -CT       Deviations/Abnormal Patterns (Gait) stride length decreased;gait speed decreased;festinating/shuffling;weight shifting decreased  -CT       Bilateral Gait Deviations weight shift ability decreased;forward flexed posture;knee buckling bilaterally  -CT       Row Name 11/28/22 1250                  Gait/Stairs (Locomotion)     Gait/Stairs Locomotion gait/ambulation assistive device  -CT       [place] Level (Gait) moderate assist (50% patient effort);2 person assist  -CT       Assistive Device (Gait) walker, front-wheeled  -CT       Distance in Feet (Gait) 4, 6, 3  3 trials  -CT       Pattern (Gait) step-to  -CT       Deviations/Abnormal Patterns (Gait) stride length decreased;gait speed decreased;festinating/shuffling;weight shifting decreased  -CT       Bilateral Gait Deviations weight shift ability decreased;forward flexed  posture;knee buckling bilaterally  -CT      Row Name 11/26/22 1403                  Gait/Stairs (Locomotion)     Gait/Stairs Locomotion gait/ambulation assistive device  -CT       [place] Level (Gait) moderate assist (50% patient effort);2 person assist  -CT       Assistive Device (Gait) walker, front-wheeled  -CT       Distance in Feet (Gait) 7 ft x 3  -CT       Pattern (Gait) step-to  -CT       Deviations/Abnormal Patterns (Gait) stride length decreased;gait speed decreased;festinating/shuffling;weight shifting decreased  -CT       Bilateral Gait Deviations weight shift ability decreased;forward flexed posture;knee buckling bilaterally  -CT      Row Name 11/25/22 1100                  Gait/Stairs (Locomotion)     Gait/Stairs Locomotion gait/ambulation assistive device  -KH       [place] Level (Gait) moderate assist (50% patient effort);maximum assist (25% patient effort);2 person assist  -KH       Assistive Device (Gait) walker, front-wheeled  -KH       Distance in Feet (Gait) Approximately 1' from chair total  -       Comment, (Gait/Stairs) LE squatting with posterior leaning  -      Row Name 11/01/22 1001                  Gait/Stairs (Locomotion)     [place] Level (Gait) minimum assist (75% patient effort);moderate assist (50% patient effort);2 person assist;verbal cues;nonverbal cues (demo/gesture)  constant verbal cueing to remain upright  -CT       Assistive Device (Gait) walker, front-wheeled  -CT       Distance in Feet (Gait) 5  -CT       Pattern (Gait) step-to  -CT       Deviations/Abnormal Patterns (Gait) gait speed decreased;festinating/shuffling;weight shifting decreased  -CT       Bilateral Gait Deviations weight shift ability decreased;forward flexed posture;knee buckling bilaterally  -CT       Comment, (Gait/Stairs) pt was unable to ambulate a second time  -CT         Vital Signs  Temp:  [97.9 °F (36.6 °C)-98.2 °F (36.8 °C)] 97.9 °F (36.6 °C)  Heart Rate:  [77-89] 77  Resp:   [16-18] 16  BP: (136-164)/(45-67) 164/45     on   ;   Device (Oxygen Therapy): room air  Body mass index is 26.02 kg/m².    Intake/Output Summary (Last 24 hours) at 2022 0745  Last data filed at 2022 0600  Gross per 24 hour   Intake 840 ml   Output 2100 ml   Net -1260 ml     Wt Readings from Last 3 Encounters:   22 75.4 kg (166 lb 3.2 oz)   10/05/22 76.6 kg (168 lb 14.4 oz)   22 76.7 kg (169 lb)       LABS:    CBC and coagulation:  Results from last 7 days   Lab Units 22  01022  0233   WBC 10*3/mm3 6.96 6.00   HEMOGLOBIN g/dL 9.8* 9.5*   HEMATOCRIT % 30.8* 31.1*   MCV fL 94.2 96.9   MCHC g/dL 31.8 30.5*   PLATELETS 10*3/mm3 203 203     Renal and electrolytes:  Results from last 7 days   Lab Units 22  0103 11/25/22  0234   SODIUM mmol/L 136 137   POTASSIUM mmol/L 3.6 4.1   MAGNESIUM mg/dL 2.0  --    CHLORIDE mmol/L 98 99   CO2 mmol/L 26.7 26.2   BUN mg/dL 35* 34*   CREATININE mg/dL 1.85* 1.80*   CALCIUM mg/dL 8.8 9.1   PHOSPHORUS mg/dL 3.6  --    GLUCOSE mg/dL 106* 103*     Estimated Creatinine Clearance: 21.8 mL/min (A) (by C-G formula based on SCr of 1.85 mg/dL (H)).    Liver and pancreatic function:  Results from last 7 days   Lab Units 22  0234   ALBUMIN g/dL 2.67*   BILIRUBIN mg/dL 0.2   ALK PHOS U/L 43   AST (SGOT) U/L 9   ALT (SGPT) U/L 5       Jannet Lagunas MD  [facility] Hospitalist  22  07:43 EST      Electronically signed by Jannet Lagunas MD at 22 0031     Jannet Lagunas MD at 22 0668              [facility]    Patient Identification:  Name:  Tre Kinney  Age:  89 y.o.  Sex:  female  :  1932  MRN:  [id number]  Visit number:  [id number]  Primary Care Provider:  Jessica Pedro APRN    Length of stay in inpatient status:  56    Brief Update     I talked to the patient's nurse today, Mitzy Vernon.  No acute issues developed overnight nor today.  The patient is  participating with PT and OT and I have reviewed their notes.  The vital signs are at goal and stable.  The last set of labs were on 2022; I will obtain another set tomorrow to ensure that these remain stable.  Over time, we may be able to draw blood work less and less depending on how the numbers trend or how the patient progresses.    Jannet Lagunas MD  [facility]  22  15:09 EST      Electronically signed by Jannet Lagunas MD at 22 1513     Jannet Lagunas MD at 22 1804              [facility] CROSS COVER NOTE    Patient Identification:  Name:  Tre Kinney  Age:  89 y.o.  Sex:  female  :  1932  MRN:  [id number]  Visit number:  [id number]  Primary Care Provider:  Jessica Pedro APRN    Length of stay in inpatient status:  55    Brief Update     I saw the patient today while she was eating her breakfast.  Nursing staff reported to me that the patient had declined to sit in her chair this morning to eat her breakfast, preferring to sit in her bed.  The patient also told the nursing staff that she prefers to do her physical therapy when her family is watching so that she can give her best effort forward while her family is watching.  I explained to the patient that her willingness to show her family what she can do when she is well rested and at her best, though an admirable goal, it is not a realistic goal because the patient will be at home and expected to be able to perform her activities of daily living even during times when she is not as rested as desired.  Thus, I encouraged the patient to sit in the bedside chair after breakfast.  I turned on the overhead lights in the room to help with the patient's circadian rhythm, in an attempt to prevent her from falling asleep during daytime hours.  We will continue to monitor her physical progress daily.  No acute issues were identified today upon review of her chart nor  reported to me by the nursing staff.     Jannet Lagunas MD   [facility] Hospitalist  22  18:04 EST      Electronically signed by Jannet Lagunas MD at 22 1810       Consult Notes (last 72 hours)  Notes from 22 0644 through 22 0644   No notes of this type exist for this encounter.            Nutrition Notes (most recent note)      Magi Oneal RD at 22 0822        Nutrition Services    Patient Name:  Tre Kinney  YOB: 1932  MRN: [id number]  Admit Date:  10/5/2022    BMI: 26.37  Diet: Soft to chew, chopped meats, regular  Supplement: Magic cup BID  Intake: 65% over 12 meals  Fluid intake: 1200ml not meeting needs    Electronically signed by:  Magi Oneal RD  22 08:22 EST     Electronically signed by Magi Oneal RD at 22 0823          Physical Therapy Notes (most recent note)      Radha Vu, PT at 22 1350  Version 1 of 1         Acute Care - Physical Therapy Initial Evaluation  [facility]     Patient Name: Tre Kinney  : 1932  MRN: [id number]  Today's Date: 2022   Onset of Illness/Injury or Date of Surgery: 10/06/22  Visit Dx:   No diagnosis found.  Patient Active Problem List   Diagnosis   • Acid reflux   • Ankle arthralgia   • Cardiac conduction disorder   • Closed fracture of distal fibula   • Arteriosclerosis of coronary artery   • Fracture of distal end of tibia   • Elevated cholesterol   • BP (high blood pressure)   • Hypertrophic polyarthritis   • OP (osteoporosis)   • Right heart failure (HCC)   • Heart failure, chronic, right-sided (HCC)   • SOB (shortness of breath)   • Palpitations   • CHF (congestive heart failure) ([facility])   • Congestive heart failure (HCC)   • Precordial pain   • Chronic anticoagulation   • Hypokalemia   • Anemia, unspecified type   • Generalized weakness     Past Medical History:   Diagnosis Date   • Anemia    • Arthritis    • CHF (congestive heart failure) ([facility])    • Chronic  kidney disease    • Coronary artery disease    • H/O blood clots    • History of transfusion     no reaction    • Hypertension      Past Surgical History:   Procedure Laterality Date   • ABDOMINAL SURGERY     • APPENDECTOMY     • CARDIAC SURGERY     •  SECTION     • COLONOSCOPY N/A 3/7/2018    Procedure: COLONOSCOPY;  Surgeon: Willie Gresham MD;  Location:  [facility];  Service:    • CORONARY ANGIOPLASTY WITH STENT PLACEMENT     • ENDOSCOPY N/A 3/6/2018    Procedure: ESOPHAGOGASTRODUODENOSCOPY;  Surgeon: Willie Gresham MD;  Location:  [facility];  Service:    • PACEMAKER IMPLANTATION      left side    • SKIN BIOPSY     • TUBAL ABDOMINAL LIGATION       PT Assessment (last 12 hours)     PT Evaluation and Treatment     Row Name 22 1350          Physical Therapy Time and Intention    Document Type therapy note (daily note)  -     Mode of Treatment physical therapy  -     Patient Effort good  -     Comment Pt seen for treatment this date with great ambulation progress since last treatment by this PT. Pt willing to work with LE TE and transferred back to reclining chair.  -     Row Name 22 1350          Living Environment    Current Living Arrangements --  -     Row Name 22 1350          Bed Mobility    Comment, (Bed Mobility) Not observed as pt was sitting in chair.  -     Row Name 22 1350          Transfers    Transfers sit-stand transfer;stand-sit transfer  -     Comment, (Transfers) STSx3 this date  -     Row Name 22 1350          Sit-Stand Transfer    Sit-Stand [place] (Transfers) contact guard;moderate assist (50% patient effort);2 person assist  -     Assistive Device (Sit-Stand Transfers) walker, front-wheeled  -     Comment, (Sit-Stand Transfer) Pt given varying assists 2/2 weakness however improved since this PT saw pt last  -     Row Name 22 1350          Stand-Sit Transfer    Stand-Sit [place] (Transfers) contact  guard;minimum assist (75% patient effort);maximum assist (25% patient effort)  -     Assistive Device (Stand-Sit Transfers) walker, front-wheeled  -     Comment, (Stand-Sit Transfer) MaxA given d/t pt LE giving way and pt was assisted for safe sitting.  -     Row Name 12/01/22 1350          Gait/Stairs (Locomotion)    Gait/Stairs Locomotion gait/ambulation assistive device  -     [place] Level (Gait) moderate assist (50% patient effort);2 person assist  -     Assistive Device (Gait) walker, front-wheeled  -     Distance in Feet (Gait) Approximate total of 10-12'  -     Row Name 12/01/22 1350          Motor Skills    Therapeutic Exercise hip;knee  marches in sitting, glute sets, LAQ R LE with yellow theraband & L LE with red theraband  -     Row Name 12/01/22 1350          Positioning and Restraints    Pre-Treatment Position sitting in chair/recliner  -     Post Treatment Position chair  -     In Chair sitting;call light within reach;with family/caregiver  -           User Key  (r) = Recorded By, (t) = Taken By, (c) = Cosigned By    Initials Name Provider Type    Radha Yusuf, EDEL Physical Therapist                Physical Therapy Education     Title: PT OT SLP Therapies (Done)     Topic: Physical Therapy (Done)     Point: Mobility training (Done)     Learning Progress Summary           Patient Acceptance, E,TB, VU by BC at 11/27/2022 1319    Acceptance, E,TB, VU by RM at 11/20/2022 1449    Acceptance, E,TB, VU by RM at 11/18/2022 1021    Acceptance, E,TB, VU by RH at 11/16/2022 1110    Acceptance, E,TB, VU by KK at 11/14/2022 1638    Acceptance, E,TB, VU by KK at 11/13/2022 1648    Acceptance, E,TB, VU by KG at 11/10/2022 1827    Acceptance, E,TB, VU by DM at 11/9/2022 1638    Acceptance, E,TB, VU by KK at 11/8/2022 1754    Acceptance, E,TB, VU by KK at 11/7/2022 1635    Acceptance, E,D, VU,NR by LL at 11/5/2022 1325    Acceptance, E, VU by EB at 11/4/2022 1448    Acceptance, E,TB,  VU by KM at 11/4/2022 1101                   Point: Home exercise program (Done)     Learning Progress Summary           Patient Acceptance, E,TB, VU by BC at 11/27/2022 1319    Acceptance, E,TB, VU by RM at 11/20/2022 1449    Acceptance, E,TB, VU by RM at 11/18/2022 1021    Acceptance, E,TB, VU by RH at 11/16/2022 1110    Acceptance, E,TB, VU by KK at 11/14/2022 1638    Acceptance, E,TB, VU by KK at 11/13/2022 1648    Acceptance, E,TB, VU by KG at 11/10/2022 1827    Acceptance, E,TB, VU by DM at 11/9/2022 1638    Acceptance, E,TB, VU by KK at 11/8/2022 1754    Acceptance, E,TB, VU by KK at 11/7/2022 1635    Acceptance, E,D, VU,NR by LL at 11/5/2022 1325    Acceptance, E, VU by EB at 11/4/2022 1448    Acceptance, E,TB, VU by KM at 11/4/2022 1101                   Point: Body mechanics (Done)     Learning Progress Summary           Patient Acceptance, E,TB, VU by BC at 11/27/2022 1319    Acceptance, E,TB, VU by RM at 11/20/2022 1449    Acceptance, E,TB, VU by RM at 11/18/2022 1021    Acceptance, E,TB, VU by RH at 11/16/2022 1110    Acceptance, E,TB, VU by KK at 11/14/2022 1638    Acceptance, E,TB, VU by KK at 11/13/2022 1648    Acceptance, E,TB, VU by KG at 11/10/2022 1827    Acceptance, E,TB, VU by DM at 11/9/2022 1638    Acceptance, E,TB, VU by KK at 11/8/2022 1754    Acceptance, E,TB, VU by KK at 11/7/2022 1635    Acceptance, E,D, VU,NR by LL at 11/5/2022 1325    Acceptance, E, VU by EB at 11/4/2022 1448    Acceptance, E,TB, VU by KM at 11/4/2022 1101                   Point: Precautions (Done)     Learning Progress Summary           Patient Acceptance, E,TB, VU by BC at 11/27/2022 1319    Acceptance, E,TB, VU by RM at 11/20/2022 1449    Acceptance, E,TB, VU by RM at 11/18/2022 1021    Acceptance, E,TB, VU by RH at 11/16/2022 1110    Acceptance, E,TB, VU by KK at 11/14/2022 1638    Acceptance, E,TB, VU by KK at 11/13/2022 1648    Acceptance, E,TB, VU by KG at 11/10/2022 1827    Acceptance, E,TB, VU by DM at  11/9/2022 1638    Acceptance, E,TB, VU by KK at 11/8/2022 1754    Acceptance, E,TB, VU by KK at 11/7/2022 1635    Acceptance, E,D, VU,NR by LL at 11/5/2022 1325    Acceptance, E, VU by EB at 11/4/2022 1448    Acceptance, E,TB, VU by KM at 11/4/2022 1101                               User Key     Initials Effective Dates Name Provider Type Discipline    LL 05/02/16 -  Neda Garcia PTA Physical Therapist Assistant PT    RM 06/14/22 -  Bethany Krause RN Registered Nurse Nurse    DM 06/16/21 -  Moniz, Deborah, RN Registered Nurse Nurse    BC 08/02/22 -  Collett, Brittany, RN Registered Nurse Nurse    KM 05/24/22 -  Mao Vázquez PT Physical Therapist PT    EB 09/22/22 -  Cedric Oliver, RN Registered Nurse Nurse    KK 06/27/22 -  Chelsi Bolivar, RN Registered Nurse Nurse    KG 09/22/22 -  Verónica Rodriguez, RN Registered Nurse Nurse     08/03/22 -  Jung Russell, RN Extern Registered Nurse Nurse              PT Recommendation and Plan     Outcome Evaluation: Pt was able to take steps this date with RW and approximately modAx2. Pt appears in better spirits today than when this PT saw her last.   Outcome Measures     Row Name 11/30/22 1442 11/29/22 1427          How much help from another is currently needed...    Putting on and taking off regular lower body clothing? 2  -KP 2  -KP     Bathing (including washing, rinsing, and drying) 2  -KP 2  -KP     Toileting (which includes using toilet bed pan or urinal) 2  -KP 2  -KP     Putting on and taking off regular upper body clothing 3  -KP 3  -KP     Taking care of personal grooming (such as brushing teeth) 3  -KP 3  -KP     Eating meals 4  -KP 4  -KP     AM-PAC 6 Clicks Score (OT) 16  -KP 16  -KP           User Key  (r) = Recorded By, (t) = Taken By, (c) = Cosigned By    Initials Name Provider Type    Linn Richardson, OT Occupational Therapist                 Time Calculation:    PT Charges     Row Name 12/01/22 1535             Time Calculation    Start Time 1350   -SURESH      PT Received On 22  -         Time Calculation- PT    Total Timed Code Minutes- PT 38 minute(s)  -SURESH            User Key  (r) = Recorded By, (t) = Taken By, (c) = Cosigned By    Initials Name Provider Type    Radha Yusuf, PT Physical Therapist              Therapy Charges for Today     Code Description Service Date Service Provider Modifiers Qty    79070587915 HC PT THER PROC EA 15 MIN 2022 Radha Vu, PT GP 1    35831363402 HC PT THERAPEUTIC ACT EA 15 MIN 2022 Radha Vu, PT GP 1    70784253279 HC GAIT TRAINING EA 15 MIN 2022 Radha Vu, PT GP 1          PT G-Codes  Outcome Measure Options: AM-PAC 6 Clicks Daily Activity (OT)  AM-PAC 6 Clicks Score (PT): 11  AM-PAC 6 Clicks Score (OT): 16    Radha Vu PT  2022      Electronically signed by Radha Vu PT at 22 1538          Occupational Therapy Notes (most recent note)      Andres Uriarte, OT at 22 1346          Acute Care - Occupational Therapy Treatment Note  [facility]     Patient Name: Tre Kinney  : 1932  MRN: [id number]  Today's Date: 2022  Onset of Illness/Injury or Date of Surgery: 10/06/22     Referring Physician: Janneth    Admit Date: 10/5/2022     No diagnosis found.  Patient Active Problem List   Diagnosis   • Acid reflux   • Ankle arthralgia   • Cardiac conduction disorder   • Closed fracture of distal fibula   • Arteriosclerosis of coronary artery   • Fracture of distal end of tibia   • Elevated cholesterol   • BP (high blood pressure)   • Hypertrophic polyarthritis   • OP (osteoporosis)   • Right heart failure (HCC)   • Heart failure, chronic, right-sided (HCC)   • SOB (shortness of breath)   • Palpitations   • CHF (congestive heart failure) (HCC)   • Congestive heart failure (HCC)   • Precordial pain   • Chronic anticoagulation   • Hypokalemia   • Anemia, unspecified type   • Generalized weakness     Past Medical History:   Diagnosis Date   • Anemia     • Arthritis    • CHF (congestive heart failure) ([facility])    • Chronic kidney disease    • Coronary artery disease    • H/O blood clots    • History of transfusion     no reaction    • Hypertension      Past Surgical History:   Procedure Laterality Date   • ABDOMINAL SURGERY     • APPENDECTOMY     • CARDIAC SURGERY     •  SECTION     • COLONOSCOPY N/A 3/7/2018    Procedure: COLONOSCOPY;  Surgeon: Willie Gresham MD;  Location:  [facility];  Service:    • CORONARY ANGIOPLASTY WITH STENT PLACEMENT     • ENDOSCOPY N/A 3/6/2018    Procedure: ESOPHAGOGASTRODUODENOSCOPY;  Surgeon: Willie Gresham MD;  Location:  [facility];  Service:    • PACEMAKER IMPLANTATION      left side    • SKIN BIOPSY     • TUBAL ABDOMINAL LIGATION           OT ASSESSMENT FLOWSHEET (last 12 hours)     OT Evaluation and Treatment     Row Name 22 1345                   OT Time and Intention    Document Type therapy note (daily note)  -KR        Mode of Treatment occupational therapy  -KR        Patient Effort good  -KR        Comment Pt seen today for continued UE ther ex, mobility training. Noted improvement throughout session. General endurance improved for UE horizontal/vertical press from seated in chair. Sit/stand x 3 with CGA. Facetime communication achieved with Willian giang. Near end of OT session, Leno giagn arrived for in-person visit. Pt son and PT remained following session.  -KR           General Information    General Observations of Patient cooperative, motivated  -KR           Cognition    Affect/Mental Status (Cognition) WFL  -KR        Orientation Status (Cognition) oriented x 3  -KR        Follows Commands (Cognition) WFL  -KR           Sit-Stand Transfer    Sit-Stand [place] (Transfers) contact guard  -KR        Assistive Device (Sit-Stand Transfers) walker, front-wheeled  -KR           Shoulder (Therapeutic Exercise)    Shoulder AROM (Therapeutic Exercise)  bilateral;flexion;extension;aBduction;aDduction;sitting;10 repetitions;3 sets  -KR        Shoulder Wand Exercises (Therapeutic Exercise) flexion;extension;aBduction;aDduction;sitting;10 repetitions;3 sets  -KR           Elbow/Forearm (Therapeutic Exercise)    Elbow/Forearm (Therapeutic Exercise) AROM (active range of motion)  -KR        Elbow/Forearm AROM (Therapeutic Exercise) bilateral;flexion;extension;sitting  -KR           Plan of Care Review    Plan of Care Reviewed With patient;son  -KR              User Key  (r) = Recorded By, (t) = Taken By, (c) = Cosigned By    Initials Name Effective Dates    Andres Ayala, OT 06/16/21 -                        OT Recommendation and Plan     Progress Toward Functional Goals (OT): progress toward functional goals is gradual  Plan of Care Review  Plan of Care Reviewed With: patient, son  Progress: improving  Plan of Care Reviewed With: patient, son     Outcome Measures     Row Name 12/01/22 1610 11/30/22 1442 11/29/22 1427       How much help from another is currently needed...    Putting on and taking off regular lower body clothing? 2  -KR 2  -KP 2  -KP    Bathing (including washing, rinsing, and drying) 2  -KR 2  -KP 2  -KP    Toileting (which includes using toilet bed pan or urinal) 2  -KR 2  -KP 2  -KP    Putting on and taking off regular upper body clothing 3  -KR 3  -KP 3  -KP    Taking care of personal grooming (such as brushing teeth) 3  -KR 3  -KP 3  -KP    Eating meals 4  -KR 4  -KP 4  -KP    AM-PAC 6 Clicks Score (OT) 16  -KR 16  -KP 16  -KP          User Key  (r) = Recorded By, (t) = Taken By, (c) = Cosigned By    Initials Name Provider Type    Andres Ayala, OT Occupational Therapist    Linn Richardson OT Occupational Therapist                Time Calculation:    Time Calculation- OT     Row Name 12/01/22 1611             Time Calculation- OT    Total Timed Code Minutes- OT 30 minute(s)  -KR            User Key  (r) = Recorded By, (t) = Taken By, (c) =  Cosigned By    Initials Name Provider Type    KR Andres Uriarte OT Occupational Therapist              Therapy Charges for Today     Code Description Service Date Service Provider Modifiers Qty    53304447561  OT THERAPEUTIC ACT EA 15 MIN 12/1/2022 Andres Uriarte OT GO 1    [id number]  OT SELF CARE/MGMT/TRAIN EA 15 MIN 12/1/2022 Andres Uriarte OT GO 1               Andres Uriarte OT  12/1/2022    Electronically signed by Andres Uriarte OT at 12/01/22 1617     Discharge Planning Assessment  [facility]     Patient Name: Tre Kinney                    MRN: [id number]  Today's Date: 11/30/2022                   Admit Date: 10/5/2022             Discharge Plan      Row Name 11/30/22 1651             Plan      Plan Pt was admitted to swing bed on 10/5/22. Pt's insurance has approved swing bed admission through 12/3/22 on this date per swing bed RN. Pt plans to return home after her insurance stops paying for swing bed program. SS to follow.

## 2022-12-02 NOTE — PLAN OF CARE
Goal Outcome Evaluation:  Plan of Care Reviewed With: patient        Progress: improving  Outcome Evaluation: Patient has rested well in bed tonight.  Bladder training continues.  Continue current plan of care.

## 2022-12-02 NOTE — CASE MANAGEMENT/SOCIAL WORK
Discharge Planning Assessment  [facility]     Patient Name: Tre Kinney  MRN: [id number]  Today's Date: 12/2/2022    Admit Date: 10/5/2022     Discharge Plan     Row Name 12/02/22 1118       Plan    Plan Pt was admitted to swing bed on 10/5/22. Pt's insurance has approved swing bed admission through 12/3/22 on this date per swing bed RN. Swing bed RN submitted updated clinicals requesting additional swing bed days on this date. Pt plans to return home after her insurance stops paying for swing bed program. SS to follow.              Continued Care and Services - Admitted Since 10/5/2022     Destination     Service Provider Request Status Selected Services Address Phone Fax Patient Preferred    [facility] Accepted N/A [address] [place] [address] [phone] --    [facility] Pending - Request Sent N/A [address] [place] [address] [phone] --    [facility] Declined  Does not have a secondary payor source N/A [address] [place] [address] [phone] --            Selected Continued Care - Prior Encounters Includes continued care and service providers with selected services from prior encounters from 7/7/2022 to 12/2/2022    Discharged on 10/5/2022 Admission date: 9/19/2022 - Discharge disposition: Swing Bed    Destination     Service Provider Selected Services Address Phone Fax Patient Preferred    [facility] SW[facility] BED Skilled Nursing [address] [place] [address] [phone] -- --                    LENORE Koch

## 2022-12-02 NOTE — THERAPY TREATMENT NOTE
Acute Care - Physical Therapy Treatment Note   [facility]     Patient Name: Tre Kinney  : 1932  MRN: [id number]  Today's Date: 2022   Onset of Illness/Injury or Date of Surgery: 10/06/22  Visit Dx:   No diagnosis found.  Patient Active Problem List   Diagnosis   • Acid reflux   • Ankle arthralgia   • Cardiac conduction disorder   • Closed fracture of distal fibula   • Arteriosclerosis of coronary artery   • Fracture of distal end of tibia   • Elevated cholesterol   • BP (high blood pressure)   • Hypertrophic polyarthritis   • OP (osteoporosis)   • Right heart failure (HCC)   • Heart failure, chronic, right-sided (HCC)   • SOB (shortness of breath)   • Palpitations   • CHF (congestive heart failure) (HCC)   • Congestive heart failure (HCC)   • Precordial pain   • Chronic anticoagulation   • Hypokalemia   • Anemia, unspecified type   • Generalized weakness     Past Medical History:   Diagnosis Date   • Anemia    • Arthritis    • CHF (congestive heart failure) (HCC)    • Chronic kidney disease    • Coronary artery disease    • H/O blood clots    • History of transfusion     no reaction    • Hypertension      Past Surgical History:   Procedure Laterality Date   • ABDOMINAL SURGERY     • APPENDECTOMY     • CARDIAC SURGERY     •  SECTION     • COLONOSCOPY N/A 3/7/2018    Procedure: COLONOSCOPY;  Surgeon: Willie Gresham MD;  Location:  [facility];  Service:    • CORONARY ANGIOPLASTY WITH STENT PLACEMENT     • ENDOSCOPY N/A 3/6/2018    Procedure: ESOPHAGOGASTRODUODENOSCOPY;  Surgeon: Willie Gresham MD;  Location:  [facility];  Service:    • PACEMAKER IMPLANTATION      left side    • SKIN BIOPSY     • TUBAL ABDOMINAL LIGATION       PT Assessment (last 12 hours)     PT Evaluation and Treatment     Row Name 22 1452          Physical Therapy Time and Intention    Subjective Information complains of;weakness  -CT     Document Type therapy note (daily note)  -CT     Mode of Treatment  physical therapy  -CT     Patient Effort good  -CT     Comment Pts treatment session [id number]. Pelon Dorsey via facetime. Pt ambulated 3 attempts at begining of session and 1 at the end of session.  -CT     Row Name 12/02/22 1452          General Information    Patient Profile Reviewed yes  -CT     Limitations/Impairments safety/cognitive  -CT     Row Name 12/02/22 1452          Cognition    Affect/Mental Status (Cognition) WFL  -CT     Row Name 12/02/22 1452          Transfers    Transfers sit-stand transfer;stand-sit transfer  -CT     Row Name 12/02/22 1452          Sit-Stand Transfer    Sit-Stand [place] (Transfers) minimum assist (75% patient effort);moderate assist (50% patient effort)  Pt requires more assist with each attempt  -CT     Assistive Device (Sit-Stand Transfers) walker, front-wheeled  -CT     Row Name 12/02/22 1452          Stand-Sit Transfer    Stand-Sit [place] (Transfers) minimum assist (75% patient effort);moderate assist (50% patient effort);verbal cues  verbal cueing to control sitting  -CT     Assistive Device (Stand-Sit Transfers) walker, front-wheeled  -CT     Row Name 12/02/22 1452          Gait/Stairs (Locomotion)    Gait/Stairs Locomotion gait/ambulation assistive device  -CT     [place] Level (Gait) moderate assist (50% patient effort);2 person assist;verbal cues;nonverbal cues (demo/gesture)  -CT     Assistive Device (Gait) walker, front-wheeled  -CT     Distance in Feet (Gait) 3 ft x 3 at begining of session 6 ft at end of session  -CT     Pattern (Gait) step-to  -CT     Deviations/Abnormal Patterns (Gait) stride length decreased;gait speed decreased;festinating/shuffling;weight shifting decreased  -CT     Bilateral Gait Deviations weight shift ability decreased;forward flexed posture;knee buckling bilaterally  -CT     Row Name 12/02/22 1452          Motor Skills    Therapeutic Exercise --  BLE seated ther ex: seated marches, LAQ, AP, hip abd/add, glut sets, hamstring  curls yellow TB 25 reps  -CT     Row Name 12/02/22 1452          Coping    Observed Emotional State pleasant;cooperative  -CT     Verbalized Emotional State acceptance  -CT     Family/Support Persons family  -CT     Row Name 12/02/22 1452          Plan of Care Review    Plan of Care Reviewed With patient  -CT     Row Name 12/02/22 1452          Positioning and Restraints    Pre-Treatment Position sitting in chair/recliner  -CT     Post Treatment Position chair  -CT     In Chair sitting;call light within reach;encouraged to call for assist;with OT  -CT           User Key  (r) = Recorded By, (t) = Taken By, (c) = Cosigned By    Initials Name Provider Type    CT Bhargavi Vásquez, PT Physical Therapist                Physical Therapy Education     Title: PT OT SLP Therapies (Done)     Topic: Physical Therapy (Done)     Point: Mobility training (Done)     Learning Progress Summary           Patient Acceptance, E,TB, VU by BC at 11/27/2022 1319    Acceptance, E,TB, VU by RM at 11/20/2022 1449    Acceptance, E,TB, VU by RM at 11/18/2022 1021    Acceptance, E,TB, VU by RH at 11/16/2022 1110    Acceptance, E,TB, VU by KK at 11/14/2022 1638    Acceptance, E,TB, VU by KK at 11/13/2022 1648    Acceptance, E,TB, VU by KG at 11/10/2022 1827    Acceptance, E,TB, VU by DM at 11/9/2022 1638    Acceptance, E,TB, VU by KK at 11/8/2022 1754    Acceptance, E,TB, VU by KK at 11/7/2022 1635    Acceptance, E,D, VU,NR by LL at 11/5/2022 1325    Acceptance, E, VU by EB at 11/4/2022 1448    Acceptance, E,TB, VU by KM at 11/4/2022 1101                   Point: Home exercise program (Done)     Learning Progress Summary           Patient Acceptance, E,TB, VU by BC at 11/27/2022 1319    Acceptance, E,TB, VU by RM at 11/20/2022 1449    Acceptance, E,TB, VU by RM at 11/18/2022 1021    Acceptance, E,TB, VU by RH at 11/16/2022 1110    Acceptance, E,TB, VU by KK at 11/14/2022 1638    Acceptance, E,TB, VU by KK at 11/13/2022 1648    Acceptance, E,TB, VU  by KG at 11/10/2022 1827    Acceptance, E,TB, VU by DM at 11/9/2022 1638    Acceptance, E,TB, VU by KK at 11/8/2022 1754    Acceptance, E,TB, VU by KK at 11/7/2022 1635    Acceptance, E,D, VU,NR by LL at 11/5/2022 1325    Acceptance, E, VU by EB at 11/4/2022 1448    Acceptance, E,TB, VU by KM at 11/4/2022 1101                   Point: Body mechanics (Done)     Learning Progress Summary           Patient Acceptance, E,TB, VU by BC at 11/27/2022 1319    Acceptance, E,TB, VU by RM at 11/20/2022 1449    Acceptance, E,TB, VU by RM at 11/18/2022 1021    Acceptance, E,TB, VU by RH at 11/16/2022 1110    Acceptance, E,TB, VU by KK at 11/14/2022 1638    Acceptance, E,TB, VU by KK at 11/13/2022 1648    Acceptance, E,TB, VU by KG at 11/10/2022 1827    Acceptance, E,TB, VU by DM at 11/9/2022 1638    Acceptance, E,TB, VU by KK at 11/8/2022 1754    Acceptance, E,TB, VU by KK at 11/7/2022 1635    Acceptance, E,D, VU,NR by LL at 11/5/2022 1325    Acceptance, E, VU by EB at 11/4/2022 1448    Acceptance, E,TB, VU by KM at 11/4/2022 1101                   Point: Precautions (Done)     Learning Progress Summary           Patient Acceptance, E,TB, VU by BC at 11/27/2022 1319    Acceptance, E,TB, VU by RM at 11/20/2022 1449    Acceptance, E,TB, VU by RM at 11/18/2022 1021    Acceptance, E,TB, VU by RH at 11/16/2022 1110    Acceptance, E,TB, VU by KK at 11/14/2022 1638    Acceptance, E,TB, VU by KK at 11/13/2022 1648    Acceptance, E,TB, VU by KG at 11/10/2022 1827    Acceptance, E,TB, VU by DM at 11/9/2022 1638    Acceptance, E,TB, VU by KK at 11/8/2022 1754    Acceptance, E,TB, VU by KK at 11/7/2022 1635    Acceptance, E,D, VU,NR by LL at 11/5/2022 1325    Acceptance, E, VU by EB at 11/4/2022 1448    Acceptance, E,TB, VU by KM at 11/4/2022 1101                               User Key     Initials Effective Dates Name Provider Type Discipline     05/02/16 -  Neda Garcia PTA Physical Therapist Assistant PT    RM 06/14/22 -  Jimi,  Bethany PARHAM, RN Registered Nurse Nurse    DM 06/16/21 -  Moniz, Deborah, RN Registered Nurse Nurse    BC 08/02/22 -  Collett, Brittany, RN Registered Nurse Nurse    KM 05/24/22 -  Mao Vázquez, PT Physical Therapist PT    EB 09/22/22 -  Cedric Oliver, RN Registered Nurse Nurse    KK 06/27/22 -  Chelsi Bolivar, RN Registered Nurse Nurse    KG 09/22/22 -  Verónica Rodriguez, RN Registered Nurse Nurse    RH 08/03/22 -  Jung Russell RN Extern Registered Nurse Nurse              PT Recommendation and Plan  Anticipated Discharge Disposition (PT): home with 24/7 [facility]  Planned Therapy Interventions (PT): balance training, bed mobility training, gait training, home exercise program, manual therapy techniques, motor coordination training, neuromuscular re-education, patient/family education, postural re-education, strengthening, transfer training  Therapy Frequency (PT): 5 times/wk (5-6 times/wk)  Progress Summary (PT)  Progress Toward Functional Goals (PT): progress toward functional goals is gradual  Plan of Care Reviewed With: patient  Progress: improving   Outcome Measures     Row Name 12/01/22 1610 11/30/22 1442          How much help from another is currently needed...    Putting on and taking off regular lower body clothing? 2  -KR 2  -KP     Bathing (including washing, rinsing, and drying) 2  -KR 2  -KP     Toileting (which includes using toilet bed pan or urinal) 2  -KR 2  -KP     Putting on and taking off regular upper body clothing 3  -KR 3  -KP     Taking care of personal grooming (such as brushing teeth) 3  -KR 3  -KP     Eating meals 4  -KR 4  -KP     AM-PAC 6 Clicks Score (OT) 16  -KR 16  -KP           User Key  (r) = Recorded By, (t) = Taken By, (c) = Cosigned By    Initials Name Provider Type    Andres Ayala OT Occupational Therapist    Linn Richardson, VELASQUEZ Occupational Therapist                 Time Calculation:    PT Charges     Row Name 12/02/22 3566             Time  Calculation    Start Time 1250  -CT      Stop Time 1335  -CT      Time Calculation (min) 45 min  -CT      PT Received On 12/02/22  -CT            User Key  (r) = Recorded By, (t) = Taken By, (c) = Cosigned By    Initials Name Provider Type    CT Bhargavi Vásquez, PT Physical Therapist              Therapy Charges for Today     Code Description Service Date Service Provider Modifiers Qty    13653002922 HC GAIT TRAINING EA 15 MIN 12/2/2022 Bhargavi Vásquez, PT GP 2    27689891910  PT THER PROC EA 15 MIN 12/2/2022 Bhargavi Vásquez, PT GP 1          PT G-Codes  Outcome Measure Options: AM-PAC 6 Clicks Daily Activity (OT)  AM-PAC 6 Clicks Score (PT): 11  AM-PAC 6 Clicks Score (OT): 16    Bhargavi Vásquez PT  12/2/2022

## 2022-12-02 NOTE — PROGRESS NOTES
"    [facility]    Patient Identification:  Name:  Tre Kinney  Age:  89 y.o.  Sex:  female  :  1932  MRN:  [id number]  Visit number:  [id number]  Primary Care Provider:  Jessica Pedro APRN    Length of stay in inpatient status:  58    Brief Update     I saw the patient with bedside nurse Verónica.  The patient was sitting in the bedside chair when I saw her.  The patient states that she doing better; she feels like she was in a \"fog\" at the beginning of her stay and that now she feels like she is becoming like her old self again.  She states that she is motivated to participate with physical therapy.  She does admit that sitting in the bedside chair does make her feel tired but she also feels like it will make her stronger as she can feel her abdominal muscles getting stronger, even if it makes her not able to walk as much as she thinks that she could if she was not sitting up most of the day.  The patient is very motivated to get stronger and go home as she has multiple outfits that she bought but has been unable to wear yet; as soon as she gets home, she would like to be able to wear those clothes.  She does state that she does not feel as depressed as she did when she was first admitted; she expresses to me how much better she feels physically and mentally.  I have reviewed her vital signs for today and they remain stable and at goal.  If she continues to do well, then I plan to repeat her labs weekly.      Jannet Lagunas MD  [facility]  22  13:33 EST    "

## 2022-12-02 NOTE — PLAN OF CARE
Goal Outcome Evaluation:  Plan of Care Reviewed With: patient        Progress: no change  Outcome Evaluation: pt currently up in chair, family at bedside, pt has no complaints, bladder training in progress, pt has not felt urge to void during shift so far with catheter clamped, no acute changes in pt, will continue plan of care

## 2022-12-03 PROCEDURE — 97110 THERAPEUTIC EXERCISES: CPT

## 2022-12-03 PROCEDURE — 97116 GAIT TRAINING THERAPY: CPT

## 2022-12-03 RX ADMIN — Medication 10 ML: at 08:54

## 2022-12-03 RX ADMIN — HYDRALAZINE HYDROCHLORIDE 5 MG: 25 TABLET, FILM COATED ORAL at 14:17

## 2022-12-03 RX ADMIN — Medication 10 ML: at 21:45

## 2022-12-03 RX ADMIN — FUROSEMIDE 40 MG: 40 TABLET ORAL at 17:27

## 2022-12-03 RX ADMIN — Medication 10 MG: at 21:46

## 2022-12-03 RX ADMIN — PANTOPRAZOLE SODIUM 40 MG: 40 TABLET, DELAYED RELEASE ORAL at 06:25

## 2022-12-03 RX ADMIN — APIXABAN 5 MG: 5 TABLET, FILM COATED ORAL at 21:46

## 2022-12-03 RX ADMIN — HYDRALAZINE HYDROCHLORIDE 5 MG: 25 TABLET, FILM COATED ORAL at 06:25

## 2022-12-03 RX ADMIN — METOPROLOL TARTRATE 25 MG: 25 TABLET, FILM COATED ORAL at 08:51

## 2022-12-03 RX ADMIN — ESCITALOPRAM 10 MG: 10 TABLET, FILM COATED ORAL at 08:51

## 2022-12-03 RX ADMIN — TAMSULOSIN HYDROCHLORIDE 0.4 MG: 0.4 CAPSULE ORAL at 08:51

## 2022-12-03 RX ADMIN — FUROSEMIDE 40 MG: 40 TABLET ORAL at 08:52

## 2022-12-03 RX ADMIN — METOPROLOL TARTRATE 25 MG: 25 TABLET, FILM COATED ORAL at 21:46

## 2022-12-03 RX ADMIN — Medication 1 TABLET: at 08:52

## 2022-12-03 RX ADMIN — GABAPENTIN 300 MG: 300 CAPSULE ORAL at 21:45

## 2022-12-03 RX ADMIN — HYDRALAZINE HYDROCHLORIDE 5 MG: 25 TABLET, FILM COATED ORAL at 21:46

## 2022-12-03 RX ADMIN — APIXABAN 5 MG: 5 TABLET, FILM COATED ORAL at 08:52

## 2022-12-03 NOTE — THERAPY TREATMENT NOTE
Acute Care - Physical Therapy Treatment Note   Scot     Patient Name: Tre Kinney  : 1932  MRN: [id number]  Today's Date: 12/3/2022   Onset of Illness/Injury or Date of Surgery: 10/06/22  Visit Dx:   No diagnosis found.  Patient Active Problem List   Diagnosis   • Acid reflux   • Ankle arthralgia   • Cardiac conduction disorder   • Closed fracture of distal fibula   • Arteriosclerosis of coronary artery   • Fracture of distal end of tibia   • Elevated cholesterol   • BP (high blood pressure)   • Hypertrophic polyarthritis   • OP (osteoporosis)   • Right heart failure (HCC)   • Heart failure, chronic, right-sided (HCC)   • SOB (shortness of breath)   • Palpitations   • CHF (congestive heart failure) (HCC)   • Congestive heart failure (HCC)   • Precordial pain   • Chronic anticoagulation   • Hypokalemia   • Anemia, unspecified type   • Generalized weakness     Past Medical History:   Diagnosis Date   • Anemia    • Arthritis    • CHF (congestive heart failure) (HCC)    • Chronic kidney disease    • Coronary artery disease    • H/O blood clots    • History of transfusion     no reaction    • Hypertension      Past Surgical History:   Procedure Laterality Date   • ABDOMINAL SURGERY     • APPENDECTOMY     • CARDIAC SURGERY     •  SECTION     • COLONOSCOPY N/A 3/7/2018    Procedure: COLONOSCOPY;  Surgeon: Willie Gresham MD;  Location:  [facility];  Service:    • CORONARY ANGIOPLASTY WITH STENT PLACEMENT     • ENDOSCOPY N/A 3/6/2018    Procedure: ESOPHAGOGASTRODUODENOSCOPY;  Surgeon: Willie Gresham MD;  Location:  [facility];  Service:    • PACEMAKER IMPLANTATION      left side    • SKIN BIOPSY     • TUBAL ABDOMINAL LIGATION       PT Assessment (last 12 hours)     PT Evaluation and Treatment     Row Name 22 1100          Physical Therapy Time and Intention    Subjective Information no complaints  -KM     Document Type therapy note (daily note)  -KM     Mode of Treatment individual  therapy;physical therapy  -KM     Patient Effort good  -KM     Symptoms Noted During/After Treatment fatigue  -     Row Name 12/03/22 1100          General Information    Patient Profile Reviewed yes  -KM     Patient Observations alert;cooperative;agree to therapy  -KM     Existing Precautions/Restrictions fall  -     Row Name 12/03/22 1100          Cognition    Affect/Mental Status (Cognition) WF  -     Orientation Status (Cognition) oriented x 4  -KM     Follows Commands (Cognition) WFL  -     Row Name 12/03/22 1100          Bed Mobility    Comment, (Bed Mobility) Not observed d/t pt. sitting on EOB at time of arrival  -KM     Row Name 12/03/22 1100          Transfers    Transfers sit-stand transfer;stand-sit transfer;bed-chair transfer  -     Comment, (Transfers) STS x3  -KM     Row Name 12/03/22 1100          Bed-Chair Transfer    Bed-Chair [place] (Transfers) moderate assist (50% patient effort);maximum assist (25% patient effort);verbal cues  -     Assistive Device (Bed-Chair Transfers) walker, front-wheeled  -     Row Name 12/03/22 1100          Sit-Stand Transfer    Sit-Stand [place] (Transfers) contact guard;minimum assist (75% patient effort)  -     Assistive Device (Sit-Stand Transfers) walker, front-wheeled  -     Row Name 12/03/22 1100          Stand-Sit Transfer    Stand-Sit [place] (Transfers) contact guard;minimum assist (75% patient effort);verbal cues  -     Assistive Device (Stand-Sit Transfers) walker, front-wheeled  -     Row Name 12/03/22 1100          Gait/Stairs (Locomotion)    Gait/Stairs Locomotion gait/ambulation independence;gait/ambulation assistive device;distance ambulated  -KM     [place] Level (Gait) moderate assist (50% patient effort)  -KM     Assistive Device (Gait) walker, front-wheeled  -     Distance in Feet (Gait) 6' x2 at varying times during session  -KM     Pattern (Gait) step-to  -KM     Deviations/Abnormal Patterns (Gait)  "stride length decreased;gait speed decreased;festinating/shuffling;weight shifting decreased  -KM     Bilateral Gait Deviations weight shift ability decreased;forward flexed posture;knee buckling bilaterally  -KM     Row Name 12/03/22 1100          Safety Issues, Functional Mobility    Safety Issues Affecting Function (Mobility) safety precaution awareness  -KM     Impairments Affecting Function (Mobility) balance;strength;endurance/activity tolerance  -KM     Row Name 12/03/22 1100          Motor Skills    Therapeutic Exercise hip;ankle;knee  -KM     Row Name 12/03/22 1100          Hip (Therapeutic Exercise)    Hip (Therapeutic Exercise) strengthening exercise  -KM     Hip Isometrics (Therapeutic Exercise) bilateral;flexion;aBduction;aDduction;sitting  -KM     Row Name 12/03/22 1100          Knee (Therapeutic Exercise)    Knee (Therapeutic Exercise) strengthening exercise  -KM     Knee Strengthening (Therapeutic Exercise) bilateral;flexion;extension;sitting  -KM     Row Name 12/03/22 1100          Ankle (Therapeutic Exercise)    Ankle (Therapeutic Exercise) strengthening exercise  -KM     Ankle Strengthening (Therapeutic Exercise) bilateral;dorsiflexion;plantarflexion;sitting  -KM     Row Name 12/03/22 1100          Progress Summary (PT)    Progress Toward Functional Goals (PT) progress toward functional goals is gradual  -KM     Daily Progress Summary (PT) Pt. was asked if she wanted to facetime her son when PT arrived and she said, \"probably not, since it is so early\". Pt. worked well w/ PT during session. She ambulated multiple times for short distances. She was able to demonstrate multiple transfers. She did well w/ seated ther-ex. Pt. was a willing participant and demonstrates improved mobility skills w/ assistance.  -KM           User Key  (r) = Recorded By, (t) = Taken By, (c) = Cosigned By    Initials Name Provider Type    Mao Batista, PT Physical Therapist                Physical Therapy Education     " Title: PT OT SLP Therapies (Done)     Topic: Physical Therapy (Done)     Point: Mobility training (Done)     Learning Progress Summary           Patient Acceptance, E,TB, VU by BC at 11/27/2022 1319    Acceptance, E,TB, VU by RM at 11/20/2022 1449    Acceptance, E,TB, VU by RM at 11/18/2022 1021    Acceptance, E,TB, VU by RH at 11/16/2022 1110    Acceptance, E,TB, VU by KK at 11/14/2022 1638    Acceptance, E,TB, VU by KK at 11/13/2022 1648    Acceptance, E,TB, VU by KG at 11/10/2022 1827    Acceptance, E,TB, VU by DM at 11/9/2022 1638    Acceptance, E,TB, VU by KK at 11/8/2022 1754    Acceptance, E,TB, VU by KK at 11/7/2022 1635    Acceptance, E,D, VU,NR by LL at 11/5/2022 1325    Acceptance, E, VU by EB at 11/4/2022 1448    Acceptance, E,TB, VU by KM at 11/4/2022 1101                   Point: Home exercise program (Done)     Learning Progress Summary           Patient Acceptance, E,TB, VU by BC at 11/27/2022 1319    Acceptance, E,TB, VU by RM at 11/20/2022 1449    Acceptance, E,TB, VU by RM at 11/18/2022 1021    Acceptance, E,TB, VU by RH at 11/16/2022 1110    Acceptance, E,TB, VU by KK at 11/14/2022 1638    Acceptance, E,TB, VU by KK at 11/13/2022 1648    Acceptance, E,TB, VU by KG at 11/10/2022 1827    Acceptance, E,TB, VU by DM at 11/9/2022 1638    Acceptance, E,TB, VU by KK at 11/8/2022 1754    Acceptance, E,TB, VU by KK at 11/7/2022 1635    Acceptance, E,D, VU,NR by LL at 11/5/2022 1325    Acceptance, E, VU by EB at 11/4/2022 1448    Acceptance, E,TB, VU by KM at 11/4/2022 1101                   Point: Body mechanics (Done)     Learning Progress Summary           Patient Acceptance, E,TB, VU by BC at 11/27/2022 1319    Acceptance, E,TB, VU by RM at 11/20/2022 1449    Acceptance, E,TB, VU by RM at 11/18/2022 1021    Acceptance, E,TB, VU by RH at 11/16/2022 1110    Acceptance, E,TB, VU by KK at 11/14/2022 1638    Acceptance, E,TB, VU by KK at 11/13/2022 1648    Acceptance, E,TB, VU by KG at 11/10/2022 1824     "Acceptance, E,TB, VU by DM at 11/9/2022 1638    Acceptance, E,TB, VU by KK at 11/8/2022 1754    Acceptance, E,TB, VU by KK at 11/7/2022 1635    Acceptance, E,D, VU,NR by LL at 11/5/2022 1325    Acceptance, E, VU by EB at 11/4/2022 1448    Acceptance, E,TB, VU by KM at 11/4/2022 1101                   Point: Precautions (Done)     Learning Progress Summary           Patient Acceptance, E,TB, VU by BC at 11/27/2022 1319    Acceptance, E,TB, VU by RM at 11/20/2022 1449    Acceptance, E,TB, VU by RM at 11/18/2022 1021    Acceptance, E,TB, VU by RH at 11/16/2022 1110    Acceptance, E,TB, VU by KK at 11/14/2022 1638    Acceptance, E,TB, VU by KK at 11/13/2022 1648    Acceptance, E,TB, VU by KG at 11/10/2022 1827    Acceptance, E,TB, VU by DM at 11/9/2022 1638    Acceptance, E,TB, VU by KK at 11/8/2022 1754    Acceptance, E,TB, VU by KK at 11/7/2022 1635    Acceptance, E,D, VU,NR by LL at 11/5/2022 1325    Acceptance, E, VU by EB at 11/4/2022 1448    Acceptance, E,TB, VU by KM at 11/4/2022 1101                               User Key     Initials Effective Dates Name Provider Type Discipline    LL 05/02/16 -  Neda Garcia PTA Physical Therapist Assistant PT    RM 06/14/22 -  Bethany Krause, RN Registered Nurse Nurse    DM 06/16/21 -  Moniz, Deborah, RN Registered Nurse Nurse    BC 08/02/22 -  Collett, Brittany, RN Registered Nurse Nurse    KM 05/24/22 -  Mao Vázquez, EDEL Physical Therapist PT    EB 09/22/22 -  Cedric Oliver, RN Registered Nurse Nurse    KK 06/27/22 -  Chelsi Bolivar, RN Registered Nurse Nurse    KG 09/22/22 -  Verónica Rodriguez, RN Registered Nurse Nurse     08/03/22 -  Jung Russell RN [facility] Registered Nurse Nurse              PT Recommendation and Plan     Progress Summary (PT)  Progress Toward Functional Goals (PT): progress toward functional goals is gradual  Daily Progress Summary (PT): Pt. was asked if she wanted to facetime her son when PT arrived and she said, \"probably not, since it is so " "early\". Pt. worked well w/ PT during session. She ambulated multiple times for short distances. She was able to demonstrate multiple transfers. She did well w/ seated ther-ex. Pt. was a willing participant and demonstrates improved mobility skills w/ assistance.   Outcome Measures     Row Name 12/03/22 1116 12/02/22 1621 12/01/22 1610       How much help from another is currently needed...    Putting on and taking off regular lower body clothing? 2  -LM 2  -KR 2  -KR    Bathing (including washing, rinsing, and drying) 2  -LM 2  -KR 2  -KR    Toileting (which includes using toilet bed pan or urinal) 2  -LM 2  -KR 2  -KR    Putting on and taking off regular upper body clothing 3  -LM 3  -KR 3  -KR    Taking care of personal grooming (such as brushing teeth) 3  -LM 3  -KR 3  -KR    Eating meals 4  -LM 4  -KR 4  -KR    AM-PAC 6 Clicks Score (OT) 16  -LM 16  -KR 16  -KR       Functional Assessment    Outcome Measure Options AM-PAC 6 Clicks Daily Activity (OT)  -LM -- --    Row Name 11/30/22 1442             How much help from another is currently needed...    Putting on and taking off regular lower body clothing? 2  -KP      Bathing (including washing, rinsing, and drying) 2  -KP      Toileting (which includes using toilet bed pan or urinal) 2  -KP      Putting on and taking off regular upper body clothing 3  -KP      Taking care of personal grooming (such as brushing teeth) 3  -KP      Eating meals 4  -KP      AM-PAC 6 Clicks Score (OT) 16  -KP            User Key  (r) = Recorded By, (t) = Taken By, (c) = Cosigned By    Initials Name Provider Type    Andres Ayala, OT Occupational Therapist    LM Татьяна Bah, OT Occupational Therapist    KP Linn Quevedo, OT Occupational Therapist                 Time Calculation:    PT Charges     Row Name 12/03/22 1119             Time Calculation    Start Time 0815  -KM      Stop Time 0842  -KM      Time Calculation (min) 27 min  -KM      PT Received On 12/03/22  -KM         " Time Calculation- PT    Total Timed Code Minutes- PT 27 minute(s)  -REMEDIOS            User Key  (r) = Recorded By, (t) = Taken By, (c) = Cosigned By    Initials Name Provider Type    Mao Batista, PT Physical Therapist              Therapy Charges for Today     Code Description Service Date Service Provider Modifiers Qty    42450309810  PT THER PROC EA 15 MIN 12/3/2022 Mao Vázquez, PT GP 1    [id number]  GAIT TRAINING EA 15 MIN 12/3/2022 Mao Vázquez, PT GP 1          PT G-Codes  Outcome Measure Options: AM-PAC 6 Clicks Daily Activity (OT)  AM-PAC 6 Clicks Score (PT): 11  AM-PAC 6 Clicks Score (OT): 16    Mao Vázquez PT  12/3/2022

## 2022-12-03 NOTE — PROGRESS NOTES
[facility] HOSPITALISTS CROSS COVER NOTE    Patient Identification:  Name:  Tre Kinney  Age:  89 y.o.  Sex:  female  :  1932  MRN:  [id number]  Visit number:  [id number]  Primary Care Provider:  Jessica Pedro APRN    Length of stay in inpatient status:  59    Brief Update     Patient did not have any acute issues reported by the nursing staff overnight.  I did contact the patient's daytime nurse, Verónica; she states that the patient has been sitting in the bedside chair most of the day and that no new issues have developed.  Patient last had blood work on 2022; the next set of blood work is tentatively planned to be collected 1 week from the last set.  However, if the patient's status changes, then we will obtain blood work sooner.  Please note that I did review her vital signs and they remain at goal and stable for her age.  I also reviewed the most recent physical therapy notes.  We will continue to monitor the patient closely and encourage her while she does her exercises.    Jannet Lagunas MD  [place] [facility]  22  08:20 EST

## 2022-12-03 NOTE — PLAN OF CARE
Goal Outcome Evaluation:  Plan of Care Reviewed With: patient        Progress: no change  Outcome Evaluation: pt up in chair for brealfast, lunch, and dinner pt has had no complaints, bladder training in progress, no acute changes, will continue plan of care

## 2022-12-03 NOTE — PLAN OF CARE
Goal Outcome Evaluation:               PT was up in chair at the beginning of shift. Ambulated back to bed with x2 assistance. Since then she has rested in bed, no c/o or s/sx of discomfort. VSS, will continue to monitor for changes.

## 2022-12-03 NOTE — THERAPY TREATMENT NOTE
Acute Care - Occupational Therapy Treatment Note   Scot     Patient Name: Tre Kinney  : 1932  MRN: [id number]  Today's Date: 12/3/2022  Onset of Illness/Injury or Date of Surgery: 10/06/22     Referring Physician: Janneth    Admit Date: 10/5/2022     No diagnosis found.  Patient Active Problem List   Diagnosis   • Acid reflux   • Ankle arthralgia   • Cardiac conduction disorder   • Closed fracture of distal fibula   • Arteriosclerosis of coronary artery   • Fracture of distal end of tibia   • Elevated cholesterol   • BP (high blood pressure)   • Hypertrophic polyarthritis   • OP (osteoporosis)   • Right heart failure (HCC)   • Heart failure, chronic, right-sided (HCC)   • SOB (shortness of breath)   • Palpitations   • CHF (congestive heart failure) (HCC)   • Congestive heart failure (HCC)   • Precordial pain   • Chronic anticoagulation   • Hypokalemia   • Anemia, unspecified type   • Generalized weakness     Past Medical History:   Diagnosis Date   • Anemia    • Arthritis    • CHF (congestive heart failure) ([facility])    • Chronic kidney disease    • Coronary artery disease    • H/O blood clots    • History of transfusion     no reaction    • Hypertension      Past Surgical History:   Procedure Laterality Date   • ABDOMINAL SURGERY     • APPENDECTOMY     • CARDIAC SURGERY     •  SECTION     • COLONOSCOPY N/A 3/7/2018    Procedure: COLONOSCOPY;  Surgeon: Willie Gresham MD;  Location:  [facility];  Service:    • CORONARY ANGIOPLASTY WITH STENT PLACEMENT     • ENDOSCOPY N/A 3/6/2018    Procedure: ESOPHAGOGASTRODUODENOSCOPY;  Surgeon: Willie Gresham MD;  Location:  [facility];  Service:    • PACEMAKER IMPLANTATION      left side    • SKIN BIOPSY     • TUBAL ABDOMINAL LIGATION           OT ASSESSMENT FLOWSHEET (last 12 hours)     OT Evaluation and Treatment     Row Name 22 1055                   OT Time and Intention    Subjective Information no complaints  -LM        Document Type  therapy note (daily note)  -LM        Mode of Treatment occupational therapy  -LM        Patient Effort good  -LM        Comment Patient seen this date from 10:55 to 11:10 for light bue arom/therex.  Patient performed yellow tband therex, bue arom towel therex.  BUE shoulder flex/ext, elbow flex/ext, hor abd/add.  Frequent rest breaks follow 3 sets of 10 reps each therex.  Attempted facetime son Willian x 2 patient request with no answer.  -LM           General Information    Existing Precautions/Restrictions fall  -LM           Cognition    Affect/Mental Status (Cognition) WFL  -LM        Orientation Status (Cognition) oriented x 3  -LM           Positioning and Restraints    In Chair encouraged to call for assist;call light within reach  -LM              User Key  (r) = Recorded By, (t) = Taken By, (c) = Cosigned By    Initials Name Effective Dates    LM Татьяна Bah, OT 06/16/21 -                        OT Recommendation and Plan           Outcome Measures     Row Name 12/03/22 1116 12/02/22 1621 12/01/22 1610       How much help from another is currently needed...    Putting on and taking off regular lower body clothing? 2  -LM 2  -KR 2  -KR    Bathing (including washing, rinsing, and drying) 2  -LM 2  -KR 2  -KR    Toileting (which includes using toilet bed pan or urinal) 2  -LM 2  -KR 2  -KR    Putting on and taking off regular upper body clothing 3  -LM 3  -KR 3  -KR    Taking care of personal grooming (such as brushing teeth) 3  -LM 3  -KR 3  -KR    Eating meals 4  -LM 4  -KR 4  -KR    AM-PAC 6 Clicks Score (OT) 16  -LM 16  -KR 16  -KR       Functional Assessment    Outcome Measure Options AM-PAC 6 Clicks Daily Activity (OT)  -LM -- --    Row Name 11/30/22 1442             How much help from another is currently needed...    Putting on and taking off regular lower body clothing? 2  -KP      Bathing (including washing, rinsing, and drying) 2  -KP      Toileting (which includes using toilet bed pan or urinal) 2   -KP      Putting on and taking off regular upper body clothing 3  -KP      Taking care of personal grooming (such as brushing teeth) 3  -KP      Eating meals 4  -KP      AM-PAC 6 Clicks Score (OT) 16  -KP            User Key  (r) = Recorded By, (t) = Taken By, (c) = Cosigned By    Initials Name Provider Type    Andres Ayala, OT Occupational Therapist    Татьяна Mccabe, OT Occupational Therapist    Linn Richardson, OT Occupational Therapist                Time Calculation:    Time Calculation- OT     Row Name 12/03/22 1116             Time Calculation- OT    Total Timed Code Minutes- OT 15 minute(s)  -            User Key  (r) = Recorded By, (t) = Taken By, (c) = Cosigned By    Initials Name Provider Type     Татьяна Bah, OT Occupational Therapist              Therapy Charges for Today     Code Description Service Date Service Provider Modifiers Qty    [id number]  OT THER PROC EA 15 MIN 12/3/2022 Татьяна Bah OT GO 1               Татьяна Bah OT  12/3/2022

## 2022-12-04 RX ADMIN — ESCITALOPRAM 10 MG: 10 TABLET, FILM COATED ORAL at 08:06

## 2022-12-04 RX ADMIN — PANTOPRAZOLE SODIUM 40 MG: 40 TABLET, DELAYED RELEASE ORAL at 06:45

## 2022-12-04 RX ADMIN — TAMSULOSIN HYDROCHLORIDE 0.4 MG: 0.4 CAPSULE ORAL at 08:06

## 2022-12-04 RX ADMIN — GABAPENTIN 300 MG: 300 CAPSULE ORAL at 21:24

## 2022-12-04 RX ADMIN — Medication 1 TABLET: at 08:06

## 2022-12-04 RX ADMIN — APIXABAN 5 MG: 5 TABLET, FILM COATED ORAL at 21:25

## 2022-12-04 RX ADMIN — METOPROLOL TARTRATE 25 MG: 25 TABLET, FILM COATED ORAL at 21:24

## 2022-12-04 RX ADMIN — Medication 10 ML: at 21:25

## 2022-12-04 RX ADMIN — HYDRALAZINE HYDROCHLORIDE 5 MG: 25 TABLET, FILM COATED ORAL at 21:24

## 2022-12-04 RX ADMIN — HYDRALAZINE HYDROCHLORIDE 5 MG: 25 TABLET, FILM COATED ORAL at 06:45

## 2022-12-04 RX ADMIN — FUROSEMIDE 40 MG: 40 TABLET ORAL at 08:06

## 2022-12-04 RX ADMIN — APIXABAN 5 MG: 5 TABLET, FILM COATED ORAL at 08:06

## 2022-12-04 RX ADMIN — HYDRALAZINE HYDROCHLORIDE 5 MG: 25 TABLET, FILM COATED ORAL at 13:28

## 2022-12-04 RX ADMIN — METOPROLOL TARTRATE 25 MG: 25 TABLET, FILM COATED ORAL at 08:06

## 2022-12-04 RX ADMIN — FUROSEMIDE 40 MG: 40 TABLET ORAL at 16:17

## 2022-12-04 RX ADMIN — Medication 10 MG: at 21:24

## 2022-12-04 NOTE — PLAN OF CARE
Goal Outcome Evaluation:               Pt up to chair at this time. Pts son at bedside this shift. No other complaints at this time.

## 2022-12-04 NOTE — PROGRESS NOTES
[facility] HOSPITALISTS CROSS COVER NOTE    Patient Identification:  Name:  Tre Kinney  Age:  89 y.o.  Sex:  female  :  1932  MRN:  [id number]  Visit number:  [id number]  Primary Care Provider:  Jessica Pedro APRN    Length of stay in inpatient status:  60    Brief Update     The patient was sitting up in the bedside chair today.  She does not have any complaints like chest pain or shortness of air.  She is excited today as she was able to put on her shoes as the swelling in her feet had gone down.  She is still doing her leg raises and feet tapping while in the chair.  She tells me that she walked some with physical therapy yesterday.  She is having severe right knee pain from arthritis.  Will try Voltaren gel on the right knee, especially when she is about to ambulate.    Jannet Lagunas MD  [facility]  22  07:56 EST

## 2022-12-05 PROCEDURE — 97530 THERAPEUTIC ACTIVITIES: CPT

## 2022-12-05 PROCEDURE — 99308 SBSQ NF CARE LOW MDM 20: CPT | Performed by: INTERNAL MEDICINE

## 2022-12-05 PROCEDURE — 97110 THERAPEUTIC EXERCISES: CPT

## 2022-12-05 PROCEDURE — 97116 GAIT TRAINING THERAPY: CPT

## 2022-12-05 PROCEDURE — 97535 SELF CARE MNGMENT TRAINING: CPT

## 2022-12-05 RX ADMIN — APIXABAN 5 MG: 5 TABLET, FILM COATED ORAL at 08:17

## 2022-12-05 RX ADMIN — TAMSULOSIN HYDROCHLORIDE 0.4 MG: 0.4 CAPSULE ORAL at 08:17

## 2022-12-05 RX ADMIN — METOPROLOL TARTRATE 25 MG: 25 TABLET, FILM COATED ORAL at 21:18

## 2022-12-05 RX ADMIN — Medication 10 MG: at 21:18

## 2022-12-05 RX ADMIN — HYDRALAZINE HYDROCHLORIDE 5 MG: 25 TABLET, FILM COATED ORAL at 21:18

## 2022-12-05 RX ADMIN — FUROSEMIDE 40 MG: 40 TABLET ORAL at 08:17

## 2022-12-05 RX ADMIN — HYDRALAZINE HYDROCHLORIDE 5 MG: 25 TABLET, FILM COATED ORAL at 13:55

## 2022-12-05 RX ADMIN — APIXABAN 5 MG: 5 TABLET, FILM COATED ORAL at 21:18

## 2022-12-05 RX ADMIN — METOPROLOL TARTRATE 25 MG: 25 TABLET, FILM COATED ORAL at 08:17

## 2022-12-05 RX ADMIN — DICLOFENAC 4 G: 10 GEL TOPICAL at 16:29

## 2022-12-05 RX ADMIN — HYDRALAZINE HYDROCHLORIDE 5 MG: 25 TABLET, FILM COATED ORAL at 05:58

## 2022-12-05 RX ADMIN — ESCITALOPRAM 10 MG: 10 TABLET, FILM COATED ORAL at 08:17

## 2022-12-05 RX ADMIN — PANTOPRAZOLE SODIUM 40 MG: 40 TABLET, DELAYED RELEASE ORAL at 05:58

## 2022-12-05 RX ADMIN — Medication 10 ML: at 21:19

## 2022-12-05 RX ADMIN — Medication 1 TABLET: at 08:17

## 2022-12-05 RX ADMIN — GABAPENTIN 300 MG: 300 CAPSULE ORAL at 21:18

## 2022-12-05 RX ADMIN — DICLOFENAC 4 G: 10 GEL TOPICAL at 11:22

## 2022-12-05 RX ADMIN — DICLOFENAC 4 G: 10 GEL TOPICAL at 21:19

## 2022-12-05 RX ADMIN — FUROSEMIDE 40 MG: 40 TABLET ORAL at 16:29

## 2022-12-05 NOTE — PROGRESS NOTES
[facility] HOSPITALIST PROGRESS NOTE    Subjective     History:   Tre Kinney is a 89 y.o. female admitted on 10/5/2022 secondary to <principal problem not specified>     Procedures: None    CC: Follow up debility    Patient seen and examined with SONAM Felipe. Awake and alert. Sitting in bedside chair during my encounter. Appears to be in better spirits today. Requesting to upgrade diet from chopped meats. No reported CP, dyspnea or palpitations. No reported nausea or vomiting.  No acute events overnight per RN.     History taken from: patient, chart, and RN.      Objective     Vital Signs  Temp:  [97.7 °F (36.5 °C)-98.3 °F (36.8 °C)] 97.7 °F (36.5 °C)  Heart Rate:  [78-85] 82  Resp:  [18-20] 20  BP: (123-155)/(60-66) 131/61    Intake/Output Summary (Last 24 hours) at 12/5/2022 1717  Last data filed at 12/5/2022 1501  Gross per 24 hour   Intake 1560 ml   Output 1600 ml   Net -40 ml         Physical Exam:  General:    Awake, alert, in no acute distress, chronically ill appearing   Heart:      Normal S1 and S2. Regular rate and rhythm. No significant murmur, rubs or gallops appreciated.   Lungs:     Respirations regular, even and unlabored. Lungs clear to auscultation B/L. No wheezes, rales or rhonchi.   Abdomen:   Soft and nontender. No guarding, rebound tenderness or  organomegaly noted. Bowel sounds present x 4.   Extremities:  Trace bilateral pedal edema. Moves UE and LE equally B/L with generalized weakness.     Results Review:    Results from last 7 days   Lab Units 12/01/22  0102   WBC 10*3/mm3 6.96   HEMOGLOBIN g/dL 9.8*   PLATELETS 10*3/mm3 203     Results from last 7 days   Lab Units 12/01/22  1713 12/01/22  0103   SODIUM mmol/L  --  136   POTASSIUM mmol/L 4.9 3.6   CHLORIDE mmol/L  --  98   CO2 mmol/L  --  26.7   BUN mg/dL  --  35*   CREATININE mg/dL  --  1.85*   CALCIUM mg/dL  --  8.8   GLUCOSE mg/dL  --  106*         Results from last 7 days   Lab Units 12/01/22  0103   MAGNESIUM mg/dL  2.0               Imaging Results (Last 24 Hours)     ** No results found for the last 24 hours. **            Medications:  apixaban, 5 mg, Oral, Q12H  Diclofenac Sodium, 4 g, Topical, 4x Daily  escitalopram, 10 mg, Oral, Daily  furosemide, 40 mg, Oral, BID  gabapentin, 300 mg, Oral, Nightly  hydrALAZINE, 5 mg, Oral, Q8H  melatonin, 10 mg, Oral, Nightly  metoprolol tartrate, 25 mg, Oral, Q12H  multivitamin, 1 tablet, Oral, Daily  pantoprazole, 40 mg, Oral, Q AM  sodium chloride, 10 mL, Intravenous, Q12H  sodium chloride, 10 mL, Intravenous, Q12H  tamsulosin, 0.4 mg, Oral, Daily      Pharmacy Consult,             Assessment & Plan   Generalized weakness/debility  Diffuse LLE DVT  DIANA on CKD IV  Acute blood loss anemia  Traumatic right gluteal hematoma  Mild hypokalemia   Paroxysmal Afib  Hx of SSS s/p pacemaker placement  Essential HTN  Reported hx of DVT's   Anxiety/depression  Advanced age    Plan  BP stable and controlled today on current regimen. Tolerating Eliquis with no reported signs of bleeding. Labs from 12/1/22 reviewed revealing stable H&H and renal function. Consult SLP to reevaluate as pt requesting diet change from chopped meats. Currently approved for swing bed through 11/28. Repeat labs in the AM. Cont PT/OT as tolerated.     Disposition: Patient currently approved for swing bed through 12/7/22.           Mustapha Loredo,   12/05/22  17:17 EST

## 2022-12-05 NOTE — CASE MANAGEMENT/SOCIAL WORK
Discharge Planning Assessment  [facility]     Patient Name: Tre Kinney  MRN: [id number]  Today's Date: 12/5/2022    Admit Date: 10/5/2022       Discharge Plan     Row Name 12/05/22 1200       Plan    Plan Pt was admitted to swing bed on 10/5/22. Pt's insurance has approved swing bed admission through 12/7/22 on this date per swing bed RN. Swing bed RN will submit updated clinicals requesting additional swing bed days on 12/7/22. Pt plans to return home after her insurance stops paying for swing bed program. SS to follow.              Continued Care and Services - Admitted Since 10/5/2022     Destination     Service Provider Request Status Selected Services Address Phone Fax Patient Preferred    [facility] Accepted N/A [address] [place] [address] [phone] --    [facility] Pending - Request Sent N/A [address] [place] [address] [phone] --    [facility] Declined  Does not have a secondary payor source N/A [address] [place] [address] [phone] --            Selected Continued Care - Prior Encounters Includes continued care and service providers with selected services from prior encounters from 7/7/2022 to 12/5/2022    Discharged on 10/5/2022 Admission date: 9/19/2022 - Discharge disposition: Swing Bed    Destination     Service Provider Selected Services Address Phone Fax Patient Preferred    [facility] [address] [facility] [place] [address] [phone] -- --                    LENORE Koch

## 2022-12-05 NOTE — THERAPY TREATMENT NOTE
Acute Care - Occupational Therapy Treatment Note   [facility]     Patient Name: Tre Kinney  : 1932  MRN: [id number]  Today's Date: 2022  Onset of Illness/Injury or Date of Surgery: 10/06/22     Referring Physician: Janneth    Admit Date: 10/5/2022     No diagnosis found.  Patient Active Problem List   Diagnosis   • Acid reflux   • Ankle arthralgia   • Cardiac conduction disorder   • Closed fracture of distal fibula   • Arteriosclerosis of coronary artery   • Fracture of distal end of tibia   • Elevated cholesterol   • BP (high blood pressure)   • Hypertrophic polyarthritis   • OP (osteoporosis)   • Right heart failure (HCC)   • Heart failure, chronic, right-sided (HCC)   • SOB (shortness of breath)   • Palpitations   • CHF (congestive heart failure) (HCC)   • Congestive heart failure (HCC)   • Precordial pain   • Chronic anticoagulation   • Hypokalemia   • Anemia, unspecified type   • Generalized weakness     Past Medical History:   Diagnosis Date   • Anemia    • Arthritis    • CHF (congestive heart failure) ([facility])    • Chronic kidney disease    • Coronary artery disease    • H/O blood clots    • History of transfusion     no reaction    • Hypertension      Past Surgical History:   Procedure Laterality Date   • ABDOMINAL SURGERY     • APPENDECTOMY     • CARDIAC SURGERY     •  SECTION     • COLONOSCOPY N/A 3/7/2018    Procedure: COLONOSCOPY;  Surgeon: Willie Gresham MD;  Location:  [facility];  Service:    • CORONARY ANGIOPLASTY WITH STENT PLACEMENT     • ENDOSCOPY N/A 3/6/2018    Procedure: ESOPHAGOGASTRODUODENOSCOPY;  Surgeon: Willie Gresham MD;  Location:  [facility];  Service:    • PACEMAKER IMPLANTATION      left side    • SKIN BIOPSY     • TUBAL ABDOMINAL LIGATION           OT ASSESSMENT FLOWSHEET (last 12 hours)     OT Evaluation and Treatment     Row Name 22 1410                   OT Time and Intention    Document Type therapy note (daily note)  -KR        Mode of  Treatment occupational therapy  -KR        Patient Effort good  -KR        Comment Pt participated today in continued ther ex for BUE strength/endurance, from seated in chair. Pt also performed fxl mobility training/fxl transfer training to [facility] with min assist. Toilet hygiene performed with min assist.  -KR           General Information    General Observations of Patient alert/cooperative  -KR           Cognition    Affect/Mental Status (Cognition) WFL  -KR        Follows Commands (Cognition) WFL  -KR           Toileting Assessment/Training    [place] Level (Toileting) toileting skills;minimum assist (75% patient effort)  -KR        Assistive Devices (Toileting) commode, bedside without drop arms  -KR        Position (Toileting) supported sitting  -KR           Shoulder (Therapeutic Exercise)    Shoulder AROM (Therapeutic Exercise) bilateral;flexion;extension;aBduction;aDduction;10 repetitions;3 sets  -KR        Shoulder Strengthening (Therapeutic Exercise) bilateral;aBduction;aDduction;resistance band;yellow  -KR           Plan of Care Review    Plan of Care Reviewed With patient  -KR           Therapy Assessment/Plan (OT)    Comment, Therapy Assessment/Plan (OT) Pt continues to make improvement in areas of self care, completing toileting activity with min assist for hygiene, min assist for fxl transfer  -KR              User Key  (r) = Recorded By, (t) = Taken By, (c) = Cosigned By    Initials Name Effective Dates    KR Andres Uriarte, OT 06/16/21 -                        OT Recommendation and Plan     Progress Toward Functional Goals (OT): progress toward functional goals is gradual  Plan of Care Review  Plan of Care Reviewed With: patient  Progress: improving  Plan of Care Reviewed With: patient     Outcome Measures     Row Name 12/05/22 1440 12/03/22 1116 12/02/22 1621       How much help from another is currently needed...    Putting on and taking off regular lower body clothing? 2  -KR 2  -LM 2  -KR     Bathing (including washing, rinsing, and drying) 2  -KR 2  -LM 2  -KR    Toileting (which includes using toilet bed pan or urinal) 3  -KR 2  -LM 2  -KR    Putting on and taking off regular upper body clothing 3  -KR 3  -LM 3  -KR    Taking care of personal grooming (such as brushing teeth) 4  -KR 3  -LM 3  -KR    Eating meals 4  -KR 4  -LM 4  -KR    AM-PAC 6 Clicks Score (OT) 18  -KR 16  -LM 16  -KR       Functional Assessment    Outcome Measure Options -- AM-PAC 6 Clicks Daily Activity (OT)  -LM --          User Key  (r) = Recorded By, (t) = Taken By, (c) = Cosigned By    Initials Name Provider Type    Andres Ayala OT Occupational Therapist    Татьяна Mccabe, OT Occupational Therapist                Time Calculation:    Time Calculation- OT     Row Name 12/05/22 1441             Time Calculation- OT    Total Timed Code Minutes- OT 30 minute(s)  -KR            User Key  (r) = Recorded By, (t) = Taken By, (c) = Cosigned By    Initials Name Provider Type    Andres Ayala OT Occupational Therapist              Therapy Charges for Today     Code Description Service Date Service Provider Modifiers Qty    08228970183  OT THERAPEUTIC ACT EA 15 MIN 12/5/2022 Andres Uriarte OT GO 1    [id number]  OT SELF CARE/MGMT/TRAIN EA 15 MIN 12/5/2022 Andres Uriarte OT GO 1               Andres Uriarte OT  12/5/2022

## 2022-12-05 NOTE — CASE MANAGEMENT/SOCIAL WORK
Patient received [employer] from [employer] reviewed at bedside with patient and son Leno. Patient signed document and requested that a copy be sent to padmaja Dorsey the designated representative with her insurance. Willian was contacted via telephone by Ryan at which time the [employer] details of last covered day 12/7/22 with anticipated discharge of 12/8/22. Informed that appeal must be started by noon on 12/6/22. Copy of [employer] sent to patients padmaja Dorsey via secure email.

## 2022-12-05 NOTE — PLAN OF CARE
Goal Outcome Evaluation:               Pt up to chair at this time, with son at bedside. No other complaints at this time.

## 2022-12-05 NOTE — THERAPY TREATMENT NOTE
Acute Care - Physical Therapy Treatment Note   [facility]     Patient Name: Tre Kinney  : 1932  MRN: [id number]  Today's Date: 2022   Onset of Illness/Injury or Date of Surgery: 10/06/22  Visit Dx:   No diagnosis found.  Patient Active Problem List   Diagnosis   • Acid reflux   • Ankle arthralgia   • Cardiac conduction disorder   • Closed fracture of distal fibula   • Arteriosclerosis of coronary artery   • Fracture of distal end of tibia   • Elevated cholesterol   • BP (high blood pressure)   • Hypertrophic polyarthritis   • OP (osteoporosis)   • Right heart failure (HCC)   • Heart failure, chronic, right-sided (HCC)   • SOB (shortness of breath)   • Palpitations   • CHF (congestive heart failure) (HCC)   • Congestive heart failure (HCC)   • Precordial pain   • Chronic anticoagulation   • Hypokalemia   • Anemia, unspecified type   • Generalized weakness     Past Medical History:   Diagnosis Date   • Anemia    • Arthritis    • CHF (congestive heart failure) (HCC)    • Chronic kidney disease    • Coronary artery disease    • H/O blood clots    • History of transfusion     no reaction    • Hypertension      Past Surgical History:   Procedure Laterality Date   • ABDOMINAL SURGERY     • APPENDECTOMY     • CARDIAC SURGERY     •  SECTION     • COLONOSCOPY N/A 3/7/2018    Procedure: COLONOSCOPY;  Surgeon: Willie Gresham MD;  Location:  [facility];  Service:    • CORONARY ANGIOPLASTY WITH STENT PLACEMENT     • ENDOSCOPY N/A 3/6/2018    Procedure: ESOPHAGOGASTRODUODENOSCOPY;  Surgeon: Willie Gresham MD;  Location:  [facility];  Service:    • PACEMAKER IMPLANTATION      left side    • SKIN BIOPSY     • TUBAL ABDOMINAL LIGATION       PT Assessment (last 12 hours)     PT Evaluation and Treatment     Row Name 22 1436          Physical Therapy Time and Intention    Subjective Information complains of;weakness;fatigue  -CT     Document Type therapy note (daily note)  -CT     Mode of  Treatment physical therapy  -CT     Patient Effort good  -CT     Comment Treatment session [id number]. Pelon Dorsey via facetime during part of treatment. Pt c/o increased knee pain from arthritis this date that limited ambulation. Pt did ambulate short [place] to [facility] [place] for toileting. Pt performed seated ther ex in chair.  -CT     Row Name 12/05/22 1436          General Information    Patient Profile Reviewed yes  -CT     Limitations/Impairments safety/cognitive  -CT     Row Name 12/05/22 1436          Cognition    Affect/Mental Status (Cognition) WFL  -CT     Row Name 12/05/22 1436          Transfers    Transfers sit-stand transfer;stand-sit transfer  -CT     Row Name 12/05/22 1436          Sit-Stand Transfer    Sit-Stand [place] (Transfers) moderate assist (50% patient effort)  -CT     Assistive Device (Sit-Stand Transfers) walker, front-wheeled  -CT     Row Name 12/05/22 1436          Stand-Sit Transfer    Stand-Sit [place] (Transfers) moderate assist (50% patient effort);verbal cues  verbal cueing to control sitting  -CT     Assistive Device (Stand-Sit Transfers) walker, front-wheeled  -CT     Row Name 12/05/22 1436          Gait/Stairs (Locomotion)    Gait/Stairs Locomotion gait/ambulation assistive device  -CT     [place] Level (Gait) moderate assist (50% patient effort);2 person assist;verbal cues;nonverbal cues (demo/gesture)  -CT     Assistive Device (Gait) walker, front-wheeled  -CT     Distance in Feet (Gait) 3 ft x 3 to [facility]  attempted 2 other trials and pt was unable to take steps d/t pain in knees  -CT     Pattern (Gait) step-to  -CT     Deviations/Abnormal Patterns (Gait) stride length decreased;gait speed decreased;festinating/shuffling;weight shifting decreased  -CT     Bilateral Gait Deviations weight shift ability decreased;forward flexed posture;knee buckling bilaterally  -CT     Row Name 12/05/22 1436          Motor Skills    Therapeutic Exercise --  BLE seated ther ex: seated  marches, LAQ, AP, glut sets, hip abd/add 25 reps  -CT     Row Name 12/05/22 1436          Coping    Observed Emotional State pleasant;cooperative  -CT     Verbalized Emotional State acceptance  -CT     Family/Support Persons family  -CT     Row Name 12/05/22 1436          Plan of Care Review    Plan of Care Reviewed With patient  -CT     Row Name 12/05/22 1436          Positioning and Restraints    Pre-Treatment Position sitting in chair/recliner  -CT     Post Treatment Position chair  -CT     In Chair sitting;call light within reach;encouraged to call for assist;notified nsg  -CT           User Key  (r) = Recorded By, (t) = Taken By, (c) = Cosigned By    Initials Name Provider Type    CT Bhargavi Vásquez PT Physical Therapist                Physical Therapy Education     Title: PT OT SLP Therapies (Done)     Topic: Physical Therapy (Done)     Point: Mobility training (Done)     Learning Progress Summary           Patient Acceptance, E,TB, VU by BC at 11/27/2022 1319    Acceptance, E,TB, VU by RM at 11/20/2022 1449    Acceptance, E,TB, VU by RM at 11/18/2022 1021    Acceptance, E,TB, VU by  at 11/16/2022 1110    Acceptance, E,TB, VU by KK at 11/14/2022 1638    Acceptance, E,TB, VU by KK at 11/13/2022 1648    Acceptance, E,TB, VU by KG at 11/10/2022 1827    Acceptance, E,TB, VU by DM at 11/9/2022 1638    Acceptance, E,TB, VU by KK at 11/8/2022 1754    Acceptance, E,TB, VU by KK at 11/7/2022 1635    Acceptance, E,D, VU,NR by LL at 11/5/2022 1325    Acceptance, E, VU by EB at 11/4/2022 1448    Acceptance, E,TB, VU by KM at 11/4/2022 1101                   Point: Home exercise program (Done)     Learning Progress Summary           Patient Acceptance, E,TB, VU by BC at 11/27/2022 1319    Acceptance, E,TB, VU by RM at 11/20/2022 1449    Acceptance, E,TB, VU by RM at 11/18/2022 1021    Acceptance, E,TB, VU by RH at 11/16/2022 1110    Acceptance, E,TB, VU by KK at 11/14/2022 1638    Acceptance, E,TB, VU by KK at  11/13/2022 1648    Acceptance, E,TB, VU by KG at 11/10/2022 1827    Acceptance, E,TB, VU by DM at 11/9/2022 1638    Acceptance, E,TB, VU by KK at 11/8/2022 1754    Acceptance, E,TB, VU by KK at 11/7/2022 1635    Acceptance, E,D, VU,NR by LL at 11/5/2022 1325    Acceptance, E, VU by EB at 11/4/2022 1448    Acceptance, E,TB, VU by KM at 11/4/2022 1101                   Point: Body mechanics (Done)     Learning Progress Summary           Patient Acceptance, E,TB, VU by BC at 11/27/2022 1319    Acceptance, E,TB, VU by RM at 11/20/2022 1449    Acceptance, E,TB, VU by RM at 11/18/2022 1021    Acceptance, E,TB, VU by RH at 11/16/2022 1110    Acceptance, E,TB, VU by KK at 11/14/2022 1638    Acceptance, E,TB, VU by KK at 11/13/2022 1648    Acceptance, E,TB, VU by KG at 11/10/2022 1827    Acceptance, E,TB, VU by DM at 11/9/2022 1638    Acceptance, E,TB, VU by KK at 11/8/2022 1754    Acceptance, E,TB, VU by KK at 11/7/2022 1635    Acceptance, E,D, VU,NR by LL at 11/5/2022 1325    Acceptance, E, VU by EB at 11/4/2022 1448    Acceptance, E,TB, VU by KM at 11/4/2022 1101                   Point: Precautions (Done)     Learning Progress Summary           Patient Acceptance, E,TB, VU by BC at 11/27/2022 1319    Acceptance, E,TB, VU by RM at 11/20/2022 1449    Acceptance, E,TB, VU by RM at 11/18/2022 1021    Acceptance, E,TB, VU by RH at 11/16/2022 1110    Acceptance, E,TB, VU by KK at 11/14/2022 1638    Acceptance, E,TB, VU by KK at 11/13/2022 1648    Acceptance, E,TB, VU by KG at 11/10/2022 1827    Acceptance, E,TB, VU by DM at 11/9/2022 1638    Acceptance, E,TB, VU by KK at 11/8/2022 1754    Acceptance, E,TB, VU by KK at 11/7/2022 1635    Acceptance, E,D, VU,NR by LL at 11/5/2022 1325    Acceptance, E, VU by EB at 11/4/2022 1448    Acceptance, E,TB, VU by KM at 11/4/2022 1101                               User Key     Initials Effective Dates Name Provider Type Discipline     05/02/16 -  Neda Garcia PTA Physical Therapist  Assistant PT    RM 06/14/22 -  Bethany Krause RN Registered Nurse Nurse    DM 06/16/21 -  Moniz, Deborah, RN Registered Nurse Nurse    BC 08/02/22 -  Collett, Brittany, RN Registered Nurse Nurse    [employer] 05/24/22 -  Mao Vázquez PT Physical Therapist PT    EB 09/22/22 -  Cedric Olivre, RN Registered Nurse Nurse    KK 06/27/22 -  Chelsi Bolivar, RN Registered Nurse Nurse    KG 09/22/22 -  Verónica Rodriguez, RN Registered Nurse Nurse    RH 08/03/22 -  Jung Russell RN Extern Registered Nurse Nurse              PT Recommendation and Plan  Anticipated Discharge Disposition (PT): home with 24/7 care, skilled nursing [facility]  Planned Therapy Interventions (PT): balance training, bed mobility training, gait training, home exercise program, manual therapy techniques, motor coordination training, neuromuscular re-education, patient/family education, postural re-education, strengthening, transfer training  Therapy Frequency (PT): 5 times/wk (5-6 times/wk)  Progress Summary (PT)  Progress Toward Functional Goals (PT): progress toward functional goals is gradual  Plan of Care Reviewed With: patient  Progress: improving   Outcome Measures     Row Name 12/05/22 1440 12/03/22 1116 12/02/22 1621       How much help from another is currently needed...    Putting on and taking off regular lower body clothing? 2  -KR 2  -LM 2  -KR    Bathing (including washing, rinsing, and drying) 2  -KR 2  -LM 2  -KR    Toileting (which includes using toilet bed pan or urinal) 3  -KR 2  -LM 2  -KR    Putting on and taking off regular upper body clothing 3  -KR 3  -LM 3  -KR    Taking care of personal grooming (such as brushing teeth) 4  -KR 3  -LM 3  -KR    Eating meals 4  -KR 4  -LM 4  -KR    AM-PAC 6 Clicks Score (OT) 18  -KR 16  -LM 16  -KR       Functional Assessment    Outcome Measure Options -- AM-PAC 6 Clicks Daily Activity (OT)  -LM --          User Key  (r) = Recorded By, (t) = Taken By, (c) = Cosigned By    Initials Name Provider Type     Andres Ayala, OT Occupational Therapist    LM Татьяна Bah, OT Occupational Therapist                 Time Calculation:    PT Charges     Row Name 12/05/22 1445             Time Calculation    Start Time 1245  -CT      Stop Time 1330  -CT      Time Calculation (min) 45 min  -CT      PT Received On 12/05/22  -CT            User Key  (r) = Recorded By, (t) = Taken By, (c) = Cosigned By    Initials Name Provider Type    CT Bhargavi Vásquez, PT Physical Therapist              Therapy Charges for Today     Code Description Service Date Service Provider Modifiers Qty    45379816223 HC GAIT TRAINING EA 15 MIN 12/5/2022 Bhargavi Vásquez, PT GP 1    03187412920 HC PT THER PROC EA 15 MIN 12/5/2022 Bhargavi Vásquez, PT GP 1    80718114595 HC PT THERAPEUTIC ACT EA 15 MIN 12/5/2022 Bhargavi Vásquez, PT GP 1          PT G-Codes  Outcome Measure Options: AM-PAC 6 Clicks Daily Activity (OT)  AM-PAC 6 Clicks Score (PT): 12  AM-PAC 6 Clicks Score (OT): 18    Bhargavi Vásquez PT  12/5/2022

## 2022-12-06 LAB
ANION GAP SERPL CALCULATED.3IONS-SCNC: 10.9 MMOL/L (ref 5–15)
BASOPHILS # BLD AUTO: 0.04 10*3/MM3 (ref 0–0.2)
BASOPHILS NFR BLD AUTO: 0.6 % (ref 0–1.5)
BUN SERPL-MCNC: 33 MG/DL (ref 8–23)
BUN/CREAT SERPL: 17.2 (ref 7–25)
CALCIUM SPEC-SCNC: 8.8 MG/DL (ref 8.6–10.5)
CHLORIDE SERPL-SCNC: 102 MMOL/L (ref 98–107)
CO2 SERPL-SCNC: 26.1 MMOL/L (ref 22–29)
CREAT SERPL-MCNC: 1.92 MG/DL (ref 0.57–1)
DEPRECATED RDW RBC AUTO: 50.8 FL (ref 37–54)
EGFRCR SERPLBLD CKD-EPI 2021: 24.7 ML/MIN/1.73
EOSINOPHIL # BLD AUTO: 0.4 10*3/MM3 (ref 0–0.4)
EOSINOPHIL NFR BLD AUTO: 5.9 % (ref 0.3–6.2)
ERYTHROCYTE [DISTWIDTH] IN BLOOD BY AUTOMATED COUNT: 14.7 % (ref 12.3–15.4)
GLUCOSE SERPL-MCNC: 104 MG/DL (ref 65–99)
HCT VFR BLD AUTO: 30.7 % (ref 34–46.6)
HGB BLD-MCNC: 9.5 G/DL (ref 12–15.9)
IMM GRANULOCYTES # BLD AUTO: 0.02 10*3/MM3 (ref 0–0.05)
IMM GRANULOCYTES NFR BLD AUTO: 0.3 % (ref 0–0.5)
LYMPHOCYTES # BLD AUTO: 1.42 10*3/MM3 (ref 0.7–3.1)
LYMPHOCYTES NFR BLD AUTO: 20.9 % (ref 19.6–45.3)
MCH RBC QN AUTO: 29.3 PG (ref 26.6–33)
MCHC RBC AUTO-ENTMCNC: 30.9 G/DL (ref 31.5–35.7)
MCV RBC AUTO: 94.8 FL (ref 79–97)
MONOCYTES # BLD AUTO: 0.64 10*3/MM3 (ref 0.1–0.9)
MONOCYTES NFR BLD AUTO: 9.4 % (ref 5–12)
NEUTROPHILS NFR BLD AUTO: 4.28 10*3/MM3 (ref 1.7–7)
NEUTROPHILS NFR BLD AUTO: 62.9 % (ref 42.7–76)
NRBC BLD AUTO-RTO: 0 /100 WBC (ref 0–0.2)
PLATELET # BLD AUTO: 200 10*3/MM3 (ref 140–450)
PMV BLD AUTO: 9.4 FL (ref 6–12)
POTASSIUM SERPL-SCNC: 3.8 MMOL/L (ref 3.5–5.2)
RBC # BLD AUTO: 3.24 10*6/MM3 (ref 3.77–5.28)
SODIUM SERPL-SCNC: 139 MMOL/L (ref 136–145)
WBC NRBC COR # BLD: 6.8 10*3/MM3 (ref 3.4–10.8)

## 2022-12-06 PROCEDURE — 97110 THERAPEUTIC EXERCISES: CPT

## 2022-12-06 PROCEDURE — 92610 EVALUATE SWALLOWING FUNCTION: CPT

## 2022-12-06 PROCEDURE — 85025 COMPLETE CBC W/AUTO DIFF WBC: CPT | Performed by: INTERNAL MEDICINE

## 2022-12-06 PROCEDURE — 80048 BASIC METABOLIC PNL TOTAL CA: CPT | Performed by: INTERNAL MEDICINE

## 2022-12-06 PROCEDURE — 97116 GAIT TRAINING THERAPY: CPT

## 2022-12-06 PROCEDURE — 99307 SBSQ NF CARE SF MDM 10: CPT | Performed by: FAMILY MEDICINE

## 2022-12-06 RX ORDER — FLUCONAZOLE 100 MG/1
100 TABLET ORAL ONCE
Status: COMPLETED | OUTPATIENT
Start: 2022-12-06 | End: 2022-12-06

## 2022-12-06 RX ADMIN — ESCITALOPRAM 10 MG: 10 TABLET, FILM COATED ORAL at 08:32

## 2022-12-06 RX ADMIN — APIXABAN 5 MG: 5 TABLET, FILM COATED ORAL at 08:32

## 2022-12-06 RX ADMIN — FUROSEMIDE 40 MG: 40 TABLET ORAL at 16:26

## 2022-12-06 RX ADMIN — DICLOFENAC 4 G: 10 GEL TOPICAL at 11:45

## 2022-12-06 RX ADMIN — DICLOFENAC 4 G: 10 GEL TOPICAL at 21:34

## 2022-12-06 RX ADMIN — HYDRALAZINE HYDROCHLORIDE 5 MG: 25 TABLET, FILM COATED ORAL at 21:34

## 2022-12-06 RX ADMIN — Medication 1 TABLET: at 08:32

## 2022-12-06 RX ADMIN — HYDROCODONE BITARTRATE AND ACETAMINOPHEN 1 TABLET: 7.5; 325 TABLET ORAL at 01:18

## 2022-12-06 RX ADMIN — HYDRALAZINE HYDROCHLORIDE 5 MG: 25 TABLET, FILM COATED ORAL at 05:24

## 2022-12-06 RX ADMIN — GABAPENTIN 300 MG: 300 CAPSULE ORAL at 21:33

## 2022-12-06 RX ADMIN — DICLOFENAC 4 G: 10 GEL TOPICAL at 08:32

## 2022-12-06 RX ADMIN — Medication 10 MG: at 21:33

## 2022-12-06 RX ADMIN — PANTOPRAZOLE SODIUM 40 MG: 40 TABLET, DELAYED RELEASE ORAL at 05:24

## 2022-12-06 RX ADMIN — METOPROLOL TARTRATE 25 MG: 25 TABLET, FILM COATED ORAL at 21:34

## 2022-12-06 RX ADMIN — METOPROLOL TARTRATE 25 MG: 25 TABLET, FILM COATED ORAL at 08:32

## 2022-12-06 RX ADMIN — Medication 10 ML: at 21:34

## 2022-12-06 RX ADMIN — HYDRALAZINE HYDROCHLORIDE 5 MG: 25 TABLET, FILM COATED ORAL at 14:38

## 2022-12-06 RX ADMIN — APIXABAN 5 MG: 5 TABLET, FILM COATED ORAL at 21:34

## 2022-12-06 RX ADMIN — FUROSEMIDE 40 MG: 40 TABLET ORAL at 08:32

## 2022-12-06 RX ADMIN — DICLOFENAC 4 G: 10 GEL TOPICAL at 16:27

## 2022-12-06 RX ADMIN — FLUCONAZOLE 100 MG: 100 TABLET ORAL at 11:45

## 2022-12-06 RX ADMIN — TAMSULOSIN HYDROCHLORIDE 0.4 MG: 0.4 CAPSULE ORAL at 08:32

## 2022-12-06 NOTE — THERAPY TREATMENT NOTE
Acute Care - Occupational Therapy Treatment Note   [place]     Patient Name: Tre Kinney  : 1932  MRN: [id number]  Today's Date: 2022  Onset of Illness/Injury or Date of Surgery: 10/06/22     Referring Physician: Janneth    Admit Date: 10/5/2022     No diagnosis found.  Patient Active Problem List   Diagnosis   • Acid reflux   • Ankle arthralgia   • Cardiac conduction disorder   • Closed fracture of distal fibula   • Arteriosclerosis of coronary artery   • Fracture of distal end of tibia   • Elevated cholesterol   • BP (high blood pressure)   • Hypertrophic polyarthritis   • OP (osteoporosis)   • Right heart failure (HCC)   • Heart failure, chronic, right-sided (HCC)   • SOB (shortness of breath)   • Palpitations   • CHF (congestive heart failure) (HCC)   • Congestive heart failure (HCC)   • Precordial pain   • Chronic anticoagulation   • Hypokalemia   • Anemia, unspecified type   • Generalized weakness     Past Medical History:   Diagnosis Date   • Anemia    • Arthritis    • CHF (congestive heart failure) ([facility])    • Chronic kidney disease    • Coronary artery disease    • H/O blood clots    • History of transfusion     no reaction    • Hypertension      Past Surgical History:   Procedure Laterality Date   • ABDOMINAL SURGERY     • APPENDECTOMY     • CARDIAC SURGERY     •  SECTION     • COLONOSCOPY N/A 3/7/2018    Procedure: COLONOSCOPY;  Surgeon: Willie Gresham MD;  Location:  [facility];  Service:    • CORONARY ANGIOPLASTY WITH STENT PLACEMENT     • ENDOSCOPY N/A 3/6/2018    Procedure: ESOPHAGOGASTRODUODENOSCOPY;  Surgeon: Willie Gresham MD;  Location:  [facility];  Service:    • PACEMAKER IMPLANTATION      left side    • SKIN BIOPSY     • TUBAL ABDOMINAL LIGATION           OT ASSESSMENT FLOWSHEET (last 12 hours)     OT Evaluation and Treatment     Row Name 22 1432                   OT Time and Intention    Subjective Information no complaints  -        Document Type  therapy note (daily note)  -        Mode of Treatment individual therapy;occupational therapy  -KP        Total Minutes, Occupational Therapy 44  -KP        Patient Effort good  -KP           General Information    Patient Profile Reviewed yes  -KP        General Observations of Patient Patient agreeable to therapy with no complaints. LLE weakness/pain in standing. Seen with PT to support patient's safety with functional mobility.  -           Pain Assessment    Pretreatment Pain Rating 0/10 - no pain  -KP        Posttreatment Pain Rating 2/10  -KP        Pain Location - Side/Orientation Left  -        Pain Location lower  -        Pain Location - extremity  -KP           Cognition    Affect/Mental Status (Cognition) WFL  -        Orientation Status (Cognition) oriented x 4  -KP        Follows Commands (Cognition) WFL  -        Personal Safety Interventions gait belt;nonskid shoes/slippers when out of bed  -           Sit-Stand Transfer    Sit-Stand [place] (Transfers) moderate assist (50% patient effort)  -        Assistive Device (Sit-Stand Transfers) walker, front-wheeled  -           Stand-Sit Transfer    Stand-Sit [place] (Transfers) moderate assist (50% patient effort);verbal cues  -        Assistive Device (Stand-Sit Transfers) walker, front-wheeled  -           Motor Skills    Functional Endurance fair plus  -        Therapeutic Exercise other (see comments)  BUE through functional movement patterns X20 reps X2 sets (yellow resistance band)  -           Plan of Care Review    Plan of Care Reviewed With patient  -KP        Progress no change  -KP           Positioning and Restraints    Pre-Treatment Position sitting in chair/recliner  -        Post Treatment Position chair  -KP        In Chair sitting;call light within reach  -              User Key  (r) = Recorded By, (t) = Taken By, (c) = Cosigned By    Initials Name Effective Dates    Linn Richardson, VELASQUEZ 06/16/21 -                         OT Recommendation and Plan     Progress Toward Functional Goals (OT): progress toward functional goals is gradual  Plan of Care Review  Plan of Care Reviewed With: patient  Progress: no change  Outcome Evaluation: Patient seen for OT treatment session (9:06 to 9:45). She declined calling son due to patient seen for self care tasks. She performed ADLs from recliner as documented. She is able to perform upper body tasks with set-up, but requires maximal assist with ADLs requiring standing. Continue plan of care.  Plan of Care Reviewed With: patient  Outcome Evaluation: Patient seen for OT treatment session (9:06 to 9:45). She declined calling son due to patient seen for self care tasks. She performed ADLs from recliner as documented. She is able to perform upper body tasks with set-up, but requires maximal assist with ADLs requiring standing. Continue plan of care.     Outcome Measures     Row Name 12/05/22 1440             How much help from another is currently needed...    Putting on and taking off regular lower body clothing? 2  -KR      Bathing (including washing, rinsing, and drying) 2  -KR      Toileting (which includes using toilet bed pan or urinal) 3  -KR      Putting on and taking off regular upper body clothing 3  -KR      Taking care of personal grooming (such as brushing teeth) 4  -KR      Eating meals 4  -KR      AM-PAC 6 Clicks Score (OT) 18  -KR            User Key  (r) = Recorded By, (t) = Taken By, (c) = Cosigned By    Initials Name Provider Type    Andres Ayala, OT Occupational Therapist                Time Calculation:    Time Calculation- OT     Row Name 12/06/22 1437             Time Calculation- OT    OT Start Time 1245  -KP      OT Stop Time 1329  -      OT Time Calculation (min) 44 min  -      Total Timed Code Minutes- OT 44 minute(s)  -      OT Received On 12/06/22  -            User Key  (r) = Recorded By, (t) = Taken By, (c) = Cosigned By    Initials Name  Provider Type     Linn Quevedo OT Occupational Therapist              Therapy Charges for Today     Code Description Service Date Service Provider Modifiers Qty    42800124721 HC OT THER PROC EA 15 MIN 12/6/2022 Linn Quevedo OT GO 1               Linn Quevedo OT  12/6/2022

## 2022-12-06 NOTE — PLAN OF CARE
Goal Outcome Evaluation:              Outcome Evaluation: Patient has rested in bed, PRN medications given for complaints of pain, no other request or complaints at this time, VSS, Will continue plan of care.

## 2022-12-06 NOTE — PLAN OF CARE
Goal Outcome Evaluation:               Pt resting with no complaints at this time. Pt up to bedside chair to day and worked with PT.

## 2022-12-06 NOTE — THERAPY EVALUATION
Acute Care - Speech Language Pathology   Swallow Initial Evaluation  [place]   CLINICAL DYSPHAGIA ASSESSMENT     Patient Name: Tre Kinney  : 1932  MRN: [id number]  Today's Date: 2022  Onset of Illness/Injury or Date of Surgery: 10/06/22     Referring Physician: Janneth      Admit Date: 10/5/2022    Visit Dx:   No diagnosis found.  Patient Active Problem List   Diagnosis   • Acid reflux   • Ankle arthralgia   • Cardiac conduction disorder   • Closed fracture of distal fibula   • Arteriosclerosis of coronary artery   • Fracture of distal end of tibia   • Elevated cholesterol   • BP (high blood pressure)   • Hypertrophic polyarthritis   • OP (osteoporosis)   • Right heart failure (HCC)   • Heart failure, chronic, right-sided (HCC)   • SOB (shortness of breath)   • Palpitations   • CHF (congestive heart failure) (HCC)   • Congestive heart failure (HCC)   • Precordial pain   • Chronic anticoagulation   • Hypokalemia   • Anemia, unspecified type   • Generalized weakness     Past Medical History:   Diagnosis Date   • Anemia    • Arthritis    • CHF (congestive heart failure) ([facility])    • Chronic kidney disease    • Coronary artery disease    • H/O blood clots    • History of transfusion     no reaction    • Hypertension      Past Surgical History:   Procedure Laterality Date   • ABDOMINAL SURGERY     • APPENDECTOMY     • CARDIAC SURGERY     •  SECTION     • COLONOSCOPY N/A 3/7/2018    Procedure: COLONOSCOPY;  Surgeon: Willie Gresham MD;  Location:  [facility];  Service:    • CORONARY ANGIOPLASTY WITH STENT PLACEMENT     • ENDOSCOPY N/A 3/6/2018    Procedure: ESOPHAGOGASTRODUODENOSCOPY;  Surgeon: Willie Gresham MD;  Location:  [facility];  Service:    • PACEMAKER IMPLANTATION      left side    • SKIN BIOPSY     • TUBAL ABDOMINAL LIGATION       Tre Kinney  is seen at bedside this pm on 3N to assess safety/efficacy of swallowing fnx, determine safest/least restrictive diet tolerance.      Ms. Kinney participated in Clinical Dysphagia Assessment 9/27/22 w/ wfl oropharyngeal swallow. She did request mechanical soft diet w/ chopped meats modification per personal preference at that time. She is now admitted to swing bed, improved in overall status and requesting diet upgrade.     Social History     Socioeconomic History   • Marital status:    Tobacco Use   • Smoking status: Never   Substance and Sexual Activity   • Alcohol use: No   • Drug use: No   • Sexual activity: Defer      No recent chest xray available for review.     Diet Orders (active) (From admission, onward)     Start     Ordered    12/05/22 1558  Diet: Texture: Soft to Chew (NDD 3); Soft to Chew: Chopped Meat; Fluid Consistency: Regular Consistency; Regular/House Diet  Diet Effective Now         12/05/22 1557    11/15/22 1200  DIET MESSAGE Dieatry supplement- magic cup  Daily With Lunch & Dinner      Comments: Dieatry supplement- magic cup    11/15/22 0257    10/06/22 1511  DIET MESSAGE Please send soup beans and cornbread on supper tray please this evening.  Once        Comments: Please send soup beans and cornbread on supper tray please this evening.    10/06/22 1511              She is observed on ra w/o complications.     Pt is positioned upright and centered in a chair at bedside to accept multiple po presentations of ice chips, solid cracker, and thin liquids via spoon, cup and straw.  She is able to self feed.     Facial/oral structures are symmetrical upon observation. Lingual protrusion reveals no deviation. Oral mucosa are moist, pink, and clean. Secretions are clear, thin, and well controlled. OROM/REE is wfl to imitate oral postures. Gag is not assessed. Volitional cough is intact w/ adequate  intensity, clear in quality, non-productive. Voice is adequate in intensity, clear in quality w/ intelligible speech. She is oriented to person, place and time, follows directives and participates in conversational exchanges.      Upon po presentations, adequate bolus anticipation and acceptance w/ good labial seal for bolus clearance via spoon bowl, cup rim stability and suction via straw. Bolus formation, manipulation and control are wfl w/ rotary mastication pattern. A-p transit is timely w/o oral residue. No overt s/s aspiration evidenced before the swallow.     Pharyngeal swallow is timely w/ adequate hyolaryngeal elevation per palpation. No overt s/s aspiration evidenced across this assessment. No silent aspiration suspected as pt is w/o changes in vocal quality, respirations or secretions post po presentations. Pt denies odynophagia.    Impression: Per this assessment, Ms. Kinney presents w/ wfl oropharyngeal swallow w/o s/s aspiration. No s/s indicative of silent aspiration. No odynophagia reported.      She is felt to most benefit from upgrade to least restrictive regular consistency, thin liquids.     SLP Recommendation and Plan  1. Regular consistency, thin liquids.    2. Meds whole in puree/thins.   3. Upright and centered for all po intake.  4. KARLO precautions.  5. Oral care protocol.  No further formal SLP f/u warranted at this time.    D/w pt results and recommendations w/ verbal agreement.    Thank you for allowing me to participate in the care of your patient-  Cathleen Hwang M.A., CCC-SLP    EDUCATION  The patient has been educated in the following areas:   Dysphagia (Swallowing Impairment).     Time Calculation:    Time Calculation- SLP     Row Name 12/06/22 1506             Untimed Charges    55682-QD Eval Oral Pharyng Swallow Minutes 38  -CJ         Total Minutes    Untimed Charges Total Minutes 38  -CJ       Total Minutes 38  -CJ            User Key  (r) = Recorded By, (t) = Taken By, (c) = Cosigned By    Initials Name Provider Type    Cathleen Flower MA,[facility]-SLP Speech and Language Pathologist                Therapy Charges for Today     Code Description Service Date Service Provider Modifiers Qty     86703550902 [facility] [place] ORAL PHARYNG SWALLOW 3 12/6/2022 Cathleen Hwang MA,CCC-SLP GN 1        Cathleen Hwang MA,JOSE-SLP  12/6/2022

## 2022-12-06 NOTE — PROGRESS NOTES
[facility] HOSPITALIST PROGRESS NOTE     Patient Identification:  Name:  Tre Kinney  Age:  89 y.o.  Sex:  female  :  1932  MRN:  [id number]  Visit Number:  [id number]  ROOM: [address] [place] [address]     Primary Care Provider:  Jessica Pedro APRN    Length of stay in inpatient status:  62    Subjective     Chief Compliant:  No chief complaint on file.      History of Presenting Illness: Patient is an 89-year-old female with a history of left lower extremity DVT, acute kidney injury on chronic kidney disease stage IV, history of traumatic right gluteal hematoma, paroxysmal atrial fibrillation, history of sick sinus syndrome with pacemaker placement, hypertension, anxiety with depression and advanced age.  Patient also with generalized weakness/debility at this time.  Patient states she is doing reasonably well at this time and is sitting in chair.  Patient states that she does have some burning/itching at the catheter site.  No other acute complaints    Objective     Current Hospital Meds:apixaban, 5 mg, Oral, Q12H  Diclofenac Sodium, 4 g, Topical, 4x Daily  escitalopram, 10 mg, Oral, Daily  furosemide, 40 mg, Oral, BID  gabapentin, 300 mg, Oral, Nightly  hydrALAZINE, 5 mg, Oral, Q8H  melatonin, 10 mg, Oral, Nightly  metoprolol tartrate, 25 mg, Oral, Q12H  multivitamin, 1 tablet, Oral, Daily  pantoprazole, 40 mg, Oral, Q AM  sodium chloride, 10 mL, Intravenous, Q12H  sodium chloride, 10 mL, Intravenous, Q12H  tamsulosin, 0.4 mg, Oral, Daily    Pharmacy Consult,       ----------------------------------------------------------------------------------------------------------------------  Vital Signs:  Temp:  [98.1 °F (36.7 °C)-98.4 °F (36.9 °C)] 98.1 °F (36.7 °C)  Heart Rate:  [75-91] 91  Resp:  [16-18] 16  BP: (123-131)/(59-70) 128/59  SpO2:  [97 %] 97 %  on   ;   Device (Oxygen Therapy): room air  Body mass index is 26.23 kg/m².    Wt Readings from Last 3 Encounters:   22 76 kg (167 lb 8 oz)    10/05/22 76.6 kg (168 lb 14.4 oz)   09/08/22 76.7 kg (169 lb)     Intake & Output (last 3 days)       12/03 0701 12/04 0700 12/04 0701 12/05 0700 12/05 0701 12/06 0700 12/06 0701 12/07 0700    P.O. 955 1320 1940     I.V. (mL/kg)   0 (0)     Other 1350       Total Intake(mL/kg) 2305 (30.8) 1320 (17.5) 1940 (25.5)     Urine (mL/kg/hr)  1350 (0.7) 1500 (0.8)     Stool        Total Output  1350 1500     Net +2305 -30 +440                 Diet: Texture: Soft to Chew (NDD 3); Soft to Chew: Chopped Meat; Fluid Consistency: Regular Consistency; Regular/House Diet  ----------------------------------------------------------------------------------------------------------------------  Physical exam:  Constitutional:   Elderly female in no acute distress  HEENT: Normocephalic atraumatic  Neck: Supple   Cardiovascular: Regular rate and rhythm  Pulmonary/Chest: Clear to auscultation  Abdominal: Positive bowel sounds soft.   Musculoskeletal: No obvious arthropathy but does complain of some knee pain at times  Neurological: No focal deficits  Skin: No rash  Peripheral vascular: Trace edema in the lower extremities  Genitourinary: Rodriguez catheter  ----------------------------------------------------------------------------------------------------------------------    Last echocardiogram:  Results for orders placed during the hospital encounter of 09/19/22    Adult Transthoracic Echo Complete W/ Cont if Necessary Per Protocol    Interpretation Summary  · Left ventricular ejection fraction appears to be 51 - 55%. Left ventricular systolic function is low normal.  · Left ventricular wall thickness is consistent with mild concentric hypertrophy.  · Left ventricular diastolic function was indeterminate.  · The right ventricular cavity is moderately dilated.  · Moderately reduced right ventricular systolic function noted.  · The left atrial cavity is moderate to severely dilated  · The right atrial cavity is moderately dilated.  ·  Moderate tricuspid valve regurgitation is present.  · Estimated right ventricular systolic pressure from tricuspid regurgitation is markedly elevated (>55 mmHg).  · Severe pulmonary hypertension is present.  · Electronic lead present in the ventricle  · There is no evidence of pericardial effusion    ----------------------------------------------------------------------------------------------------------------------  Results from last 7 days   Lab Units 12/06/22 0220 12/01/22 0102   WBC 10*3/mm3 6.80 6.96   HEMOGLOBIN g/dL 9.5* 9.8*   HEMATOCRIT % 30.7* 30.8*   MCV fL 94.8 94.2   MCHC g/dL 30.9* 31.8   PLATELETS 10*3/mm3 200 203         Results from last 7 days   Lab Units 12/06/22 0220 12/01/22  1713 12/01/22  0103   SODIUM mmol/L 139  --  136   POTASSIUM mmol/L 3.8 4.9 3.6   MAGNESIUM mg/dL  --   --  2.0   CHLORIDE mmol/L 102  --  98   CO2 mmol/L 26.1  --  26.7   BUN mg/dL 33*  --  35*   CREATININE mg/dL 1.92*  --  1.85*   CALCIUM mg/dL 8.8  --  8.8   PHOSPHORUS mg/dL  --   --  3.6   GLUCOSE mg/dL 104*  --  106*   Estimated Creatinine Clearance: 21.1 mL/min (A) (by C-G formula based on SCr of 1.92 mg/dL (H)).  No results found for: AMMONIA              No results found for: HGBA1C, POCGLU  Lab Results   Component Value Date    TSH 3.030 10/07/2022     No results found for: PREGTESTUR, PREGSERUM, HCG, HCGQUANT  Pain Management Panel     Pain Management Panel Latest Ref Rng & Units 9/20/2022 7/24/2019    CREATININE UR mg/dL 110.9 48.9    AMPHETAMINES SCREEN, URINE Negative - -    BARBITURATES SCREEN Negative - -    BENZODIAZEPINE SCREEN, URINE Negative - -    COCAINE SCREEN, URINE Negative - -    METHADONE SCREEN, URINE Negative - -        Brief Urine Lab Results  (Last result in the past 365 days)      Color   Clarity   Blood   Leuk Est   Nitrite   Protein   CREAT   Urine HCG        11/18/22 0953 Yellow   Turbid   Large (3+)   Large (3+)   Negative   30 mg/dL (1+)               No results found for: BLOODCX       No results found for: URINECX  No results found for: WOUNDCX  No results found for: STOOLCX        I have personally looked at the labs and they are summarized above.  ----------------------------------------------------------------------------------------------------------------------  Detailed radiology reports for the last 24 hours:    Imaging Results (Last 24 Hours)     ** No results found for the last 24 hours. **        Final impressions for the last 30 days of radiology reports:    XR Chest PA & Lateral    Result Date: 11/5/2022  Large hiatal hernia. Lungs are adequately aerated  This report was finalized on 11/5/2022 2:08 PM by Dr. Bob Lovell MD.      XR Hip With or Without Pelvis 2 - 3 View Left    Result Date: 11/18/2022    No acute findings in the left hip.  This report was finalized on 11/18/2022 4:25 PM by Dr. Ion Del Rosario MD.      I have personally looked at the radiology images and read the final radiology report.    Assessment & Plan    Debility--encourage patient to continue to work with physical therapy to the best of her ability.  Yesterday patient requiring moderate assistance for sit to stand and stand to sit transfers.  Patient was able to walk 3 feet on 3 occasions to the bedside commode with front wheel walker and moderate two-person assistance.  Patient was able to take unable to take other steps secondary to complaints of pain in her knees.  Did do functional mobility training with occupational therapy yesterday.    Left lower extremity DVT patient currently on Eliquis.    Acute kidney injury on chronic kidney disease stage IV--creatinine appears to have been stable over the last couple weeks.  I will monitor    History of traumatic right gluteal hematoma with blood loss anemia stable    Paroxysmal atrial fibrillation controlled.  Patient is on Eliquis    History of sick sinus syndrome with pacemaker placement    Hypertension well controlled patient is on metoprolol and  hydralazine.    Depression with anxiety component continue escitalopram    Likely vaginitis.  Give one-time dose of Diflucan.    VTE Prophylaxis:   Mechanical Order History:      Ordered        10/05/22 1252  Place Sequential Compression Device  Once            10/05/22 1252  Maintain Sequential Compression Device  Continuous                    Pharmalogical Order History:      Ordered     Dose Route Frequency Stop    10/27/22 1034  apixaban (ELIQUIS) tablet 5 mg         5 mg PO Every 12 Hours Scheduled --    10/21/22 1301  Enoxaparin Sodium (LOVENOX) syringe 70 mg  Status:  Discontinued         1 mg/kg SC Every 24 Hours 10/27/22 1034    10/17/22 1147  Enoxaparin Sodium (LOVENOX) syringe 80 mg  Status:  Discontinued         1 mg/kg SC Every 24 Hours 10/21/22 1301    10/12/22 1847  Enoxaparin Sodium (LOVENOX) syringe 70 mg  Status:  Discontinued         1 mg/kg SC Every 24 Hours 10/17/22 1147    10/12/22 1831  Pharmacy to Dose enoxaparin (LOVENOX)  Status:  Discontinued         -- XX Continuous PRN 10/13/22 1417    10/05/22 1252  heparin (porcine) 5000 UNIT/ML injection 5,000 Units  Status:  Discontinued         5,000 Units SC Every 12 Hours Scheduled 10/12/22 1846                The patient is high risk due to the following diagnoses/reasons: Advanced age, high risk for falls  Dispo: Eventually home  Lucio Murphy MD  [facility]  12/06/22  10:07 EST

## 2022-12-06 NOTE — THERAPY TREATMENT NOTE
Acute Care - Physical Therapy Treatment Note   [facility]     Patient Name: Tre Kinney  : 1932  MRN: [id number]  Today's Date: 2022   Onset of Illness/Injury or Date of Surgery: 10/06/22  Visit Dx:   No diagnosis found.  Patient Active Problem List   Diagnosis   • Acid reflux   • Ankle arthralgia   • Cardiac conduction disorder   • Closed fracture of distal fibula   • Arteriosclerosis of coronary artery   • Fracture of distal end of tibia   • Elevated cholesterol   • BP (high blood pressure)   • Hypertrophic polyarthritis   • OP (osteoporosis)   • Right heart failure (HCC)   • Heart failure, chronic, right-sided (HCC)   • SOB (shortness of breath)   • Palpitations   • CHF (congestive heart failure) (HCC)   • Congestive heart failure (HCC)   • Precordial pain   • Chronic anticoagulation   • Hypokalemia   • Anemia, unspecified type   • Generalized weakness     Past Medical History:   Diagnosis Date   • Anemia    • Arthritis    • CHF (congestive heart failure) (HCC)    • Chronic kidney disease    • Coronary artery disease    • H/O blood clots    • History of transfusion     no reaction    • Hypertension      Past Surgical History:   Procedure Laterality Date   • ABDOMINAL SURGERY     • APPENDECTOMY     • CARDIAC SURGERY     •  SECTION     • COLONOSCOPY N/A 3/7/2018    Procedure: COLONOSCOPY;  Surgeon: Willie Gresham MD;  Location:  [facility];  Service:    • CORONARY ANGIOPLASTY WITH STENT PLACEMENT     • ENDOSCOPY N/A 3/6/2018    Procedure: ESOPHAGOGASTRODUODENOSCOPY;  Surgeon: Willie Gresham MD;  Location:  [facility];  Service:    • PACEMAKER IMPLANTATION      left side    • SKIN BIOPSY     • TUBAL ABDOMINAL LIGATION       PT Assessment (last 12 hours)     PT Evaluation and Treatment     Row Name 22 1355          Physical Therapy Time and Intention    Subjective Information complains of;weakness  -CT     Document Type therapy note (daily note)  -CT     Mode of Treatment  physical therapy  -CT     Patient Effort good  -CT     Comment Pts treatment session [id number]. Pelon Dorsey via facetime. Pt ambulated x 2 attempts and performed seated ther ex in chair.  -CT     Row Name 12/06/22 1353          General Information    Patient Profile Reviewed yes  -CT     Limitations/Impairments safety/cognitive  -CT     Row Name 12/06/22 1357          Cognition    Affect/Mental Status (Cognition) WFL  -CT     Row Name 12/06/22 135          Transfers    Transfers sit-stand transfer;stand-sit transfer  -CT     Row Name 12/06/22 1355          Sit-Stand Transfer    Sit-Stand [place] (Transfers) moderate assist (50% patient effort)  -CT     Assistive Device (Sit-Stand Transfers) walker, front-wheeled  -CT     Row Name 12/06/22 1352          Stand-Sit Transfer    Stand-Sit [place] (Transfers) moderate assist (50% patient effort);verbal cues  verbal cueing to control sitting  -CT     Assistive Device (Stand-Sit Transfers) walker, front-wheeled  -CT     Row Name 12/06/22 1352          Gait/Stairs (Locomotion)    Gait/Stairs Locomotion gait/ambulation assistive device  -CT     [place] Level (Gait) moderate assist (50% patient effort);2 person assist;verbal cues;nonverbal cues (demo/gesture)  -CT     Assistive Device (Gait) walker, front-wheeled  -CT     Distance in Feet (Gait) 5 ft x 2  pt c/o knee pain with ambulation  -CT     Pattern (Gait) step-to  -CT     Deviations/Abnormal Patterns (Gait) stride length decreased;gait speed decreased;festinating/shuffling;weight shifting decreased  -CT     Bilateral Gait Deviations weight shift ability decreased;forward flexed posture;knee buckling bilaterally  -CT     Row Name 12/06/22 1359          Motor Skills    Therapeutic Exercise --  BLE seated ther ex: LAQ, seated marches, AP, glut sets x 2, hip abd/add 25 reps  -CT     Row Name 12/06/22 1353          Coping    Observed Emotional State pleasant;cooperative  -CT     Verbalized Emotional State  acceptance  -CT     Family/Support Persons family  -CT     Row Name 12/06/22 1359          Plan of Care Review    Plan of Care Reviewed With patient  -CT     Row Name 12/06/22 5533          Positioning and Restraints    Pre-Treatment Position sitting in chair/recliner  -CT     Post Treatment Position chair  -CT     In Chair sitting;call light within reach;encouraged to call for assist;notified nsg  -CT           User Key  (r) = Recorded By, (t) = Taken By, (c) = Cosigned By    Initials Name Provider Type    CT Bhargavi Vásquez PT Physical Therapist                Physical Therapy Education     Title: PT OT SLP Therapies (Done)     Topic: Physical Therapy (Done)     Point: Mobility training (Done)     Learning Progress Summary           Patient Acceptance, E,TB, VU by BC at 11/27/2022 1319    Acceptance, E,TB, VU by RM at 11/20/2022 1449    Acceptance, E,TB, VU by RM at 11/18/2022 1021    Acceptance, E,TB, VU by RH at 11/16/2022 1110    Acceptance, E,TB, VU by KK at 11/14/2022 1638    Acceptance, E,TB, VU by KK at 11/13/2022 1648    Acceptance, E,TB, VU by KG at 11/10/2022 1827    Acceptance, E,TB, VU by DM at 11/9/2022 1638    Acceptance, E,TB, VU by KK at 11/8/2022 1754    Acceptance, E,TB, VU by KK at 11/7/2022 1635    Acceptance, E,D, VU,NR by LL at 11/5/2022 1325    Acceptance, E, VU by EB at 11/4/2022 1448    Acceptance, E,TB, VU by KM at 11/4/2022 1101                   Point: Home exercise program (Done)     Learning Progress Summary           Patient Acceptance, E,TB, VU by BC at 11/27/2022 1319    Acceptance, E,TB, VU by RM at 11/20/2022 1449    Acceptance, E,TB, VU by RM at 11/18/2022 1021    Acceptance, E,TB, VU by RH at 11/16/2022 1110    Acceptance, E,TB, VU by KK at 11/14/2022 1638    Acceptance, E,TB, VU by KK at 11/13/2022 1648    Acceptance, E,TB, VU by KG at 11/10/2022 1827    Acceptance, E,TB, VU by DM at 11/9/2022 1638    Acceptance, E,TB, VU by KK at 11/8/2022 1754    Acceptance, E,TB, VU by KK  at 11/7/2022 1635    Acceptance, E,D, VU,NR by LL at 11/5/2022 1325    Acceptance, E, VU by EB at 11/4/2022 1448    Acceptance, E,TB, VU by KM at 11/4/2022 1101                   Point: Body mechanics (Done)     Learning Progress Summary           Patient Acceptance, E,TB, VU by BC at 11/27/2022 1319    Acceptance, E,TB, VU by RM at 11/20/2022 1449    Acceptance, E,TB, VU by RM at 11/18/2022 1021    Acceptance, E,TB, VU by RH at 11/16/2022 1110    Acceptance, E,TB, VU by KK at 11/14/2022 1638    Acceptance, E,TB, VU by KK at 11/13/2022 1648    Acceptance, E,TB, VU by KG at 11/10/2022 1827    Acceptance, E,TB, VU by DM at 11/9/2022 1638    Acceptance, E,TB, VU by KK at 11/8/2022 1754    Acceptance, E,TB, VU by KK at 11/7/2022 1635    Acceptance, E,D, VU,NR by LL at 11/5/2022 1325    Acceptance, E, VU by EB at 11/4/2022 1448    Acceptance, E,TB, VU by KM at 11/4/2022 1101                   Point: Precautions (Done)     Learning Progress Summary           Patient Acceptance, E,TB, VU by BC at 11/27/2022 1319    Acceptance, E,TB, VU by RM at 11/20/2022 1449    Acceptance, E,TB, VU by RM at 11/18/2022 1021    Acceptance, E,TB, VU by RH at 11/16/2022 1110    Acceptance, E,TB, VU by KK at 11/14/2022 1638    Acceptance, E,TB, VU by KK at 11/13/2022 1648    Acceptance, E,TB, VU by KG at 11/10/2022 1827    Acceptance, E,TB, VU by DM at 11/9/2022 1638    Acceptance, E,TB, VU by KK at 11/8/2022 1754    Acceptance, E,TB, VU by KK at 11/7/2022 1635    Acceptance, E,D, VU,NR by LL at 11/5/2022 1325    Acceptance, E, VU by EB at 11/4/2022 1448    Acceptance, E,TB, VU by KM at 11/4/2022 1101                               User Key     Initials Effective Dates Name Provider Type Discipline     05/02/16 -  Neda Garcia PTA Physical Therapist Assistant PT    RM 06/14/22 -  Bethany Krause RN Registered Nurse Nurse    DM 06/16/21 -  Moniz, Deborah, RN Registered Nurse Nurse    BC 08/02/22 -  Collett, Brittany, RN Registered Nurse  Nurse    KM 05/24/22 -  Mao Vázquez, PT Physical Therapist PT    EB 09/22/22 -  Cedric Oliver, RN Registered Nurse Nurse    KK 06/27/22 -  Chelsi Bolivar, RN Registered Nurse Nurse    KG 09/22/22 -  Verónica Rodriguez, RN Registered Nurse Nurse    RH 08/03/22 -  Jung Russell RN Extern Registered Nurse Nurse              PT Recommendation and Plan  Anticipated Discharge Disposition (PT): home with 24/7 care, skilled nursing [facility]  Planned Therapy Interventions (PT): balance training, bed mobility training, gait training, home exercise program, manual therapy techniques, motor coordination training, neuromuscular re-education, patient/family education, postural re-education, strengthening, transfer training  Therapy Frequency (PT): 5 times/wk (5-6 times/wk)  Progress Summary (PT)  Progress Toward Functional Goals (PT): progress toward functional goals is gradual  Plan of Care Reviewed With: patient  Progress: improving   Outcome Measures     Row Name 12/05/22 1440             How much help from another is currently needed...    Putting on and taking off regular lower body clothing? 2  -KR      Bathing (including washing, rinsing, and drying) 2  -KR      Toileting (which includes using toilet bed pan or urinal) 3  -KR      Putting on and taking off regular upper body clothing 3  -KR      Taking care of personal grooming (such as brushing teeth) 4  -KR      Eating meals 4  -KR      AM-PAC 6 Clicks Score (OT) 18  -KR            User Key  (r) = Recorded By, (t) = Taken By, (c) = Cosigned By    Initials Name Provider Type    Andres Ayala, OT Occupational Therapist                 Time Calculation:    PT Charges     Row Name 12/06/22 1402             Time Calculation    Start Time 1245  -CT      Stop Time 1329  -CT      Time Calculation (min) 44 min  -CT      PT Received On 12/06/22  -CT            User Key  (r) = Recorded By, (t) = Taken By, (c) = Cosigned By    Initials Name Provider Type    CT Bhargavi Vásquez  PT Physical Therapist              Therapy Charges for Today     Code Description Service Date Service Provider Modifiers Qty    05940772312  GAIT TRAINING EA 15 MIN 12/5/2022 Bhargavi Vásquez, PT GP 1    71251780817 HC PT THER PROC EA 15 MIN 12/5/2022 Bhargavi Vásquez, PT GP 1    [id number]  PT THERAPEUTIC ACT EA 15 MIN 12/5/2022 Bhargavi Vásquez, PT GP 1    96254494014 HC GAIT TRAINING EA 15 MIN 12/6/2022 Bhargavi Vásquez, PT GP 1    [id number]  PT THER PROC EA 15 MIN 12/6/2022 Bhargavi Vásquez, PT GP 1          PT G-Codes  Outcome Measure Options: AM-PAC 6 Clicks Daily Activity (OT)  AM-PAC 6 Clicks Score (PT): 12  AM-PAC 6 Clicks Score (OT): 18    Bhargavi Vásquez, EDEL  12/6/2022

## 2022-12-06 NOTE — CASE MANAGEMENT/SOCIAL WORK
Discharge Planning Assessment  [facility]     Patient Name: Tre Kinney  MRN: [id number]  Today's Date: 12/6/2022    Admit Date: 10/5/2022     Discharge Plan     Row Name 12/06/22 1116       Plan    Plan Pt was admitted to swing bed on 10/5/22. Swing bed RN provided pt with [facility] from [employer] yesterday. Swing bed RN reviewed NONMC with pt yesterday, while Leno bird was present. Pt signed NONMC and requested that a copy be sent to Willian bird the designated representative with her insurance. Willian Bird was contacted via telephone by , Ryan yesterday at which time the [employer] details of last covered day 12/7/22 with anticipated discharge of 12/8/22. Willian Bird was informed that appeal must be started by noon on 12/6/22. Swing bed RN has sent a copy of [employer] to Willian bird via secure email. Pt's Willian bird filed an appeal with [employer]  to follow.              Continued Care and Services - Admitted Since 10/5/2022     Destination     Service Provider Request Status Selected Services Address Phone Fax Patient Preferred    [facility] [address] [facility] [address] [phone] --    [facility] Pending - Request Sent N/A [address] [place] [address] [phone] --    [facility] Declined  Does not have a secondary payor source N/A [address] [facility] [address] [phone] --            Selected Continued Care - Prior Encounters Includes continued care and service providers with selected services from prior encounters from 7/7/2022 to 12/6/2022    Discharged on 10/5/2022 Admission date: 9/19/2022 - Discharge disposition: Swing Bed    Destination     Service Provider Selected Services Address Phone Fax Patient Preferred    [facility]illed [facility] [address] [facility] [place] [address] [phone] -- --                    LENORE Koch

## 2022-12-07 PROCEDURE — 97116 GAIT TRAINING THERAPY: CPT

## 2022-12-07 PROCEDURE — 97110 THERAPEUTIC EXERCISES: CPT

## 2022-12-07 RX ADMIN — ESCITALOPRAM 10 MG: 10 TABLET, FILM COATED ORAL at 09:37

## 2022-12-07 RX ADMIN — Medication 10 ML: at 09:38

## 2022-12-07 RX ADMIN — PANTOPRAZOLE SODIUM 40 MG: 40 TABLET, DELAYED RELEASE ORAL at 05:25

## 2022-12-07 RX ADMIN — HYDRALAZINE HYDROCHLORIDE 5 MG: 25 TABLET, FILM COATED ORAL at 21:39

## 2022-12-07 RX ADMIN — METOPROLOL TARTRATE 25 MG: 25 TABLET, FILM COATED ORAL at 09:37

## 2022-12-07 RX ADMIN — Medication 10 MG: at 21:39

## 2022-12-07 RX ADMIN — DICLOFENAC 4 G: 10 GEL TOPICAL at 18:25

## 2022-12-07 RX ADMIN — Medication 1 TABLET: at 09:37

## 2022-12-07 RX ADMIN — FUROSEMIDE 40 MG: 40 TABLET ORAL at 09:37

## 2022-12-07 RX ADMIN — DICLOFENAC 4 G: 10 GEL TOPICAL at 21:40

## 2022-12-07 RX ADMIN — APIXABAN 5 MG: 5 TABLET, FILM COATED ORAL at 21:39

## 2022-12-07 RX ADMIN — APIXABAN 5 MG: 5 TABLET, FILM COATED ORAL at 09:37

## 2022-12-07 RX ADMIN — GABAPENTIN 300 MG: 300 CAPSULE ORAL at 21:39

## 2022-12-07 RX ADMIN — FUROSEMIDE 40 MG: 40 TABLET ORAL at 18:25

## 2022-12-07 RX ADMIN — METOPROLOL TARTRATE 25 MG: 25 TABLET, FILM COATED ORAL at 21:39

## 2022-12-07 RX ADMIN — HYDRALAZINE HYDROCHLORIDE 5 MG: 25 TABLET, FILM COATED ORAL at 05:25

## 2022-12-07 RX ADMIN — DICLOFENAC 4 G: 10 GEL TOPICAL at 09:38

## 2022-12-07 RX ADMIN — TAMSULOSIN HYDROCHLORIDE 0.4 MG: 0.4 CAPSULE ORAL at 09:37

## 2022-12-07 RX ADMIN — Medication 10 ML: at 21:40

## 2022-12-07 RX ADMIN — Medication 10 ML: at 21:39

## 2022-12-07 NOTE — CASE MANAGEMENT/SOCIAL WORK
Discharge Planning Assessment  [facility]     Patient Name: Tre Kinney  MRN: [id number]  Today's Date: 12/7/2022    Admit Date: 10/5/2022     Discharge Plan     Row Name 12/07/22 1635       Plan    Plan Pt was admitted to swing bed on 10/5/22. Discharge appeal is still pending per swing bed RN. SS to follow.              Continued Care and Services - Admitted Since 10/5/2022     Destination     Service Provider Request Status Selected Services Address Phone Fax Patient Preferred    [facility] Accepted N/A [address] [facility] [address] [phone] --    [facility] Pending - Request Sent N/A [address] [facility] [address] [phone] --    [facility] Declined  Does not have a secondary payor source N/A [address] [facility] [place] [address] [phone] --            Selected Continued Care - Prior Encounters Includes continued care and service providers with selected services from prior encounters from 7/7/2022 to 12/7/2022    Discharged on 10/5/2022 Admission date: 9/19/2022 - Discharge disposition: Swing Bed    Destination     Service Provider Selected Services Address Phone Fax Patient Preferred    [facility] BED Skilled Nursing [address] [facility] [place] [address] [phone] -- --                    LENORE Koch

## 2022-12-07 NOTE — THERAPY TREATMENT NOTE
Acute Care - Physical Therapy Treatment Note   [place]     Patient Name: Tre Kinney  : 1932  MRN: [id number]  Today's Date: 2022   Onset of Illness/Injury or Date of Surgery: 10/06/22  Visit Dx:   No diagnosis found.  Patient Active Problem List   Diagnosis   • Acid reflux   • Ankle arthralgia   • Cardiac conduction disorder   • Closed fracture of distal fibula   • Arteriosclerosis of coronary artery   • Fracture of distal end of tibia   • Elevated cholesterol   • BP (high blood pressure)   • Hypertrophic polyarthritis   • OP (osteoporosis)   • Right heart failure (HCC)   • Heart failure, chronic, right-sided (HCC)   • SOB (shortness of breath)   • Palpitations   • CHF (congestive heart failure) (HCC)   • Congestive heart failure (HCC)   • Precordial pain   • Chronic anticoagulation   • Hypokalemia   • Anemia, unspecified type   • Generalized weakness     Past Medical History:   Diagnosis Date   • Anemia    • Arthritis    • CHF (congestive heart failure) (HCC)    • Chronic kidney disease    • Coronary artery disease    • H/O blood clots    • History of transfusion     no reaction    • Hypertension      Past Surgical History:   Procedure Laterality Date   • ABDOMINAL SURGERY     • APPENDECTOMY     • CARDIAC SURGERY     •  SECTION     • COLONOSCOPY N/A 3/7/2018    Procedure: COLONOSCOPY;  Surgeon: Willie Gresham MD;  Location:  [facility];  Service:    • CORONARY ANGIOPLASTY WITH STENT PLACEMENT     • ENDOSCOPY N/A 3/6/2018    Procedure: ESOPHAGOGASTRODUODENOSCOPY;  Surgeon: Willie Gresham MD;  Location:  [facility];  Service:    • PACEMAKER IMPLANTATION      left side    • SKIN BIOPSY     • TUBAL ABDOMINAL LIGATION       PT Assessment (last 12 hours)     PT Evaluation and Treatment     Row Name 22 1250          Physical Therapy Time and Intention    Subjective Information no complaints  -CT     Document Type therapy note (daily note)  -CT     Mode of Treatment physical  therapy  -CT     Patient Effort good  -CT     Comment Pts treatment session [id number]. Pelon Dorsey via [employer]. Pt ambulated x 3 attempts this date and performed seated ther ex.  -CT     Row Name 12/07/22 1250          General Information    Patient Profile Reviewed yes  -CT     Limitations/Impairments safety/cognitive  -CT     Row Name 12/07/22 1250          Cognition    Affect/Mental Status (Cognition) WFL  -CT     Row Name 12/07/22 1250          Transfers    Transfers sit-stand transfer;stand-sit transfer  -CT     Row Name 12/07/22 1250          Sit-Stand Transfer    Sit-Stand [place] (Transfers) moderate assist (50% patient effort)  -CT     Assistive Device (Sit-Stand Transfers) walker, front-wheeled  -CT     Row Name 12/07/22 1250          Stand-Sit Transfer    Stand-Sit [place] (Transfers) moderate assist (50% patient effort);verbal cues  verbal cueing to control sitting  -CT     Assistive Device (Stand-Sit Transfers) walker, front-wheeled  -CT     Row Name 12/07/22 1250          Gait/Stairs (Locomotion)    Gait/Stairs Locomotion gait/ambulation assistive device  -CT     [place] Level (Gait) minimum assist (75% patient effort);moderate assist (50% patient effort);2 person assist;verbal cues;nonverbal cues (demo/gesture)  pt requires more assist with each attempt  -CT     Assistive Device (Gait) walker, front-wheeled  -CT     Distance in Feet (Gait) 6, 15, 3  -CT     Pattern (Gait) step-to  -CT     Deviations/Abnormal Patterns (Gait) stride length decreased;gait speed decreased;festinating/shuffling;weight shifting decreased  -CT     Bilateral Gait Deviations weight shift ability decreased;forward flexed posture;knee buckling bilaterally  -CT     Row Name 12/07/22 1250          Motor Skills    Therapeutic Exercise --  BLE seated ther ex: LAQ, AP, seated marches, glut sets, hip abd/add 25-30 reps each  -CT     Row Name 12/07/22 1250          Coping    Observed Emotional State pleasant;cooperative   -CT     Verbalized Emotional State acceptance  -CT     Family/Support Persons family  -CT     Row Name 12/07/22 1250          Plan of Care Review    Plan of Care Reviewed With patient  -CT     Row Name 12/07/22 1250          Positioning and Restraints    Pre-Treatment Position sitting in chair/recliner  -CT     Post Treatment Position chair  -CT     In Chair sitting;call light within reach;encouraged to call for assist;notified nsg  -CT           User Key  (r) = Recorded By, (t) = Taken By, (c) = Cosigned By    Initials Name Provider Type    CT Bhargavi Vásquez PT Physical Therapist                Physical Therapy Education     Title: PT OT SLP Therapies (Done)     Topic: Physical Therapy (Done)     Point: Mobility training (Done)     Learning Progress Summary           Patient Acceptance, E,TB, VU by BC at 11/27/2022 1319    Acceptance, E,TB, VU by RM at 11/20/2022 1449    Acceptance, E,TB, VU by RM at 11/18/2022 1021    Acceptance, E,TB, VU by RH at 11/16/2022 1110    Acceptance, E,TB, VU by KK at 11/14/2022 1638    Acceptance, E,TB, VU by KK at 11/13/2022 1648    Acceptance, E,TB, VU by KG at 11/10/2022 1827    Acceptance, E,TB, VU by DM at 11/9/2022 1638    Acceptance, E,TB, VU by KK at 11/8/2022 1754    Acceptance, E,TB, VU by KK at 11/7/2022 1635    Acceptance, E,D, VU,NR by LL at 11/5/2022 1325    Acceptance, E, VU by EB at 11/4/2022 1448    Acceptance, E,TB, VU by KM at 11/4/2022 1101                   Point: Home exercise program (Done)     Learning Progress Summary           Patient Acceptance, E,TB, VU by BC at 11/27/2022 1319    Acceptance, E,TB, VU by RM at 11/20/2022 1449    Acceptance, E,TB, VU by RM at 11/18/2022 1021    Acceptance, E,TB, VU by RH at 11/16/2022 1110    Acceptance, E,TB, VU by KK at 11/14/2022 1638    Acceptance, E,TB, VU by KK at 11/13/2022 1648    Acceptance, E,TB, VU by KG at 11/10/2022 1827    Acceptance, E,TB, VU by DM at 11/9/2022 1638    Acceptance, E,TB, VU by KK at 11/8/2022  1754    Acceptance, E,TB, VU by KK at 11/7/2022 1635    Acceptance, E,D, VU,NR by LL at 11/5/2022 1325    Acceptance, E, VU by EB at 11/4/2022 1448    Acceptance, E,TB, VU by KM at 11/4/2022 1101                   Point: Body mechanics (Done)     Learning Progress Summary           Patient Acceptance, E,TB, VU by BC at 11/27/2022 1319    Acceptance, E,TB, VU by RM at 11/20/2022 1449    Acceptance, E,TB, VU by RM at 11/18/2022 1021    Acceptance, E,TB, VU by RH at 11/16/2022 1110    Acceptance, E,TB, VU by KK at 11/14/2022 1638    Acceptance, E,TB, VU by KK at 11/13/2022 1648    Acceptance, E,TB, VU by KG at 11/10/2022 1827    Acceptance, E,TB, VU by DM at 11/9/2022 1638    Acceptance, E,TB, VU by KK at 11/8/2022 1754    Acceptance, E,TB, VU by KK at 11/7/2022 1635    Acceptance, E,D, VU,NR by LL at 11/5/2022 1325    Acceptance, E, VU by EB at 11/4/2022 1448    Acceptance, E,TB, VU by KM at 11/4/2022 1101                   Point: Precautions (Done)     Learning Progress Summary           Patient Acceptance, E,TB, VU by BC at 11/27/2022 1319    Acceptance, E,TB, VU by RM at 11/20/2022 1449    Acceptance, E,TB, VU by RM at 11/18/2022 1021    Acceptance, E,TB, VU by RH at 11/16/2022 1110    Acceptance, E,TB, VU by KK at 11/14/2022 1638    Acceptance, E,TB, VU by KK at 11/13/2022 1648    Acceptance, E,TB, VU by KG at 11/10/2022 1827    Acceptance, E,TB, VU by DM at 11/9/2022 1638    Acceptance, E,TB, VU by KK at 11/8/2022 1754    Acceptance, E,TB, VU by KK at 11/7/2022 1635    Acceptance, E,D, VU,NR by LL at 11/5/2022 1325    Acceptance, E, VU by EB at 11/4/2022 1448    Acceptance, E,TB, VU by KM at 11/4/2022 1101                               User Key     Initials Effective Dates Name Provider Type Discipline     05/02/16 -  Neda Garcia PTA Physical Therapist Assistant PT    RM 06/14/22 -  Bethany Krause, RN Registered Nurse Nurse    DM 06/16/21 -  Moniz, Deborah, RN Registered Nurse Nurse    BC 08/02/22 -   Collett, Brittany, RN Registered Nurse Nurse    KM 05/24/22 -  Mao Vázquez, PT Physical Therapist PT    EB 09/22/22 -  Cedric Oliver, RN Registered Nurse Nurse    KK 06/27/22 -  Chelsi Bolivar, RN Registered Nurse Nurse    KG 09/22/22 -  Verónica Rodriguez, RN Registered Nurse Nurse    RH 08/03/22 -  Jung Russell RN Extern Registered Nurse Nurse              PT Recommendation and Plan  Anticipated Discharge Disposition (PT): home with 24/7 care, skilled nursing [facility]  Planned Therapy Interventions (PT): balance training, bed mobility training, gait training, home exercise program, manual therapy techniques, motor coordination training, neuromuscular re-education, patient/family education, postural re-education, strengthening, transfer training  Therapy Frequency (PT): 5 times/wk (5-6 times/wk)  Progress Summary (PT)  Progress Toward Functional Goals (PT): progress toward functional goals is gradual  Plan of Care Reviewed With: patient  Progress: improving   Outcome Measures     Row Name 12/07/22 1506 12/05/22 1440          How much help from another is currently needed...    Putting on and taking off regular lower body clothing? 2  -KP 2  -KR     Bathing (including washing, rinsing, and drying) 2  -KP 2  -KR     Toileting (which includes using toilet bed pan or urinal) 3  -KP 3  -KR     Putting on and taking off regular upper body clothing 3  -KP 3  -KR     Taking care of personal grooming (such as brushing teeth) 4  -KP 4  -KR     Eating meals 4  -KP 4  -KR     AM-PAC 6 Clicks Score (OT) 18  -KP 18  -KR           User Key  (r) = Recorded By, (t) = Taken By, (c) = Cosigned By    Initials Name Provider Type    Andres Ayala OT Occupational Therapist    Linn Richardson, OT Occupational Therapist                 Time Calculation:    PT Charges     Row Name 12/07/22 0719             Time Calculation    Start Time 1250  -CT      Stop Time 1340  -CT      Time Calculation (min) 50 min  -CT      PT Received On  12/07/22  -CT            User Key  (r) = Recorded By, (t) = Taken By, (c) = Cosigned By    Initials Name Provider Type    CT Bhargavi Vásquez, PT Physical Therapist              Therapy Charges for Today     Code Description Service Date Service Provider Modifiers Qty    14739569762 HC GAIT TRAINING EA 15 MIN 12/6/2022 Bhargavi Vásquez, PT GP 1    24390461208 HC PT THER PROC EA 15 MIN 12/6/2022 Bhargavi Vásquez, PT GP 1    35069925609 HC GAIT TRAINING EA 15 MIN 12/7/2022 Bhargavi Vásquez, PT GP 1    86374939414 HC PT THER PROC EA 15 MIN 12/7/2022 Bhargavi Vásquez, PT GP 1          PT G-Codes  Outcome Measure Options: AM-PAC 6 Clicks Daily Activity (OT)  AM-PAC 6 Clicks Score (PT): 12  AM-PAC 6 Clicks Score (OT): 18    Bhargavi Vásquez PT  12/7/2022

## 2022-12-07 NOTE — PROGRESS NOTES
Pt admitted to swing bed on 10/5 for additional therapy in the setting of generalized weakness/debility. Pt not seen or examined today. I have reviewed her VS. I have discussed her care with SONAM Tenorio who does not report any acute events or issues that need to be addressed. S/P a dose of Diflucan for suspected vaginitis. BP stable today. Cont current antihypertensive regimen. Cont Eliquis for treatment of DVT. Cont PT/OT. Pt currently approved for swing bed through today. Discharge appeal currently pending.         Mustapha Loredo DO  12/07/22  15:52 EDT

## 2022-12-07 NOTE — THERAPY TREATMENT NOTE
Acute Care - Occupational Therapy Treatment Note   [place]     Patient Name: Tre Kinney  : 1932  MRN: [id number]  Today's Date: 2022  Onset of Illness/Injury or Date of Surgery: 10/06/22     Referring Physician: Janneth    Admit Date: 10/5/2022     No diagnosis found.  Patient Active Problem List   Diagnosis   • Acid reflux   • Ankle arthralgia   • Cardiac conduction disorder   • Closed fracture of distal fibula   • Arteriosclerosis of coronary artery   • Fracture of distal end of tibia   • Elevated cholesterol   • BP (high blood pressure)   • Hypertrophic polyarthritis   • OP (osteoporosis)   • Right heart failure (HCC)   • Heart failure, chronic, right-sided (HCC)   • SOB (shortness of breath)   • Palpitations   • CHF (congestive heart failure) (HCC)   • Congestive heart failure (HCC)   • Precordial pain   • Chronic anticoagulation   • Hypokalemia   • Anemia, unspecified type   • Generalized weakness     Past Medical History:   Diagnosis Date   • Anemia    • Arthritis    • CHF (congestive heart failure) ([facility])    • Chronic kidney disease    • Coronary artery disease    • H/O blood clots    • History of transfusion     no reaction    • Hypertension      Past Surgical History:   Procedure Laterality Date   • ABDOMINAL SURGERY     • APPENDECTOMY     • CARDIAC SURGERY     •  SECTION     • COLONOSCOPY N/A 3/7/2018    Procedure: COLONOSCOPY;  Surgeon: Willie Gresham MD;  Location:  [facility];  Service:    • CORONARY ANGIOPLASTY WITH STENT PLACEMENT     • ENDOSCOPY N/A 3/6/2018    Procedure: ESOPHAGOGASTRODUODENOSCOPY;  Surgeon: Willie Gresham MD;  Location:  [facility];  Service:    • PACEMAKER IMPLANTATION      left side    • SKIN BIOPSY     • TUBAL ABDOMINAL LIGATION           OT ASSESSMENT FLOWSHEET (last 12 hours)     OT Evaluation and Treatment     Row Name 22 1501                   OT Time and Intention    Subjective Information no complaints  -KP        Document Type  therapy note (daily note)  -        Mode of Treatment individual therapy;occupational therapy  -KP        Total Minutes, Occupational Therapy 50  -KP        Patient Effort good  -KP           General Information    Patient Profile Reviewed yes  -KP        General Observations of Patient Patient agreeable to therapy. She was seen from 1250 to 1330 on this date. PT is room for safety with functional mobility. Son on Facetime throughout session.  -        Existing Precautions/Restrictions fall  -KP        Limitations/Impairments safety/cognitive  -KP        Equipment Issued to Patient gait belt  -           Pain Assessment    Pretreatment Pain Rating 0/10 - no pain  -KP        Posttreatment Pain Rating 0/10 - no pain  -KP           Cognition    Affect/Mental Status (Cognition) WFL  -        Orientation Status (Cognition) oriented x 4  -KP        Follows Commands (Cognition) WFL  -        Personal Safety Interventions gait belt;nonskid shoes/slippers when out of bed  -           Sit-Stand Transfer    Sit-Stand [place] (Transfers) moderate assist (50% patient effort)  -        Assistive Device (Sit-Stand Transfers) walker, front-wheeled  -           Stand-Sit Transfer    Stand-Sit [place] (Transfers) moderate assist (50% patient effort);verbal cues  -        Assistive Device (Stand-Sit Transfers) walker, front-wheeled  -KP           Motor Skills    Functional Endurance fair  -        Therapeutic Exercise other (see comments)  BUE exercise while seated through functional movement patterns with yellow theraband X20 reps X2 sets (AROM for shoulder flexion)  -           Positioning and Restraints    Pre-Treatment Position sitting in chair/recliner  -        Post Treatment Position chair  -KP        In Chair call light within reach  -              User Key  (r) = Recorded By, (t) = Taken By, (c) = Cosigned By    Initials Name Effective Dates    Linn Richardson OT 06/16/21 -                         OT Recommendation and Plan     Progress Toward Functional Goals (OT): progress toward functional goals is gradual  Plan of Care Review  Plan of Care Reviewed With: patient  Progress: no change  Outcome Evaluation: Patient seen for OT treatment session (9:06 to 9:45). She declined calling son due to patient seen for self care tasks. She performed ADLs from recliner as documented. She is able to perform upper body tasks with set-up, but requires maximal assist with ADLs requiring standing. Continue plan of care.  Plan of Care Reviewed With: patient  Outcome Evaluation: Patient seen for OT treatment session (9:06 to 9:45). She declined calling son due to patient seen for self care tasks. She performed ADLs from recliner as documented. She is able to perform upper body tasks with set-up, but requires maximal assist with ADLs requiring standing. Continue plan of care.     Outcome Measures     Row Name 12/07/22 1506 12/05/22 1440          How much help from another is currently needed...    Putting on and taking off regular lower body clothing? 2  -KP 2  -KR     Bathing (including washing, rinsing, and drying) 2  -KP 2  -KR     Toileting (which includes using toilet bed pan or urinal) 3  -KP 3  -KR     Putting on and taking off regular upper body clothing 3  -KP 3  -KR     Taking care of personal grooming (such as brushing teeth) 4  -KP 4  -KR     Eating meals 4  -KP 4  -KR     AM-PAC 6 Clicks Score (OT) 18  -KP 18  -KR           User Key  (r) = Recorded By, (t) = Taken By, (c) = Cosigned By    Initials Name Provider Type    Andres Ayala OT Occupational Therapist    Linn Richardson OT Occupational Therapist                Time Calculation:    Time Calculation- OT     Row Name 12/07/22 1506             Time Calculation- OT    OT Start Time 1250  -      OT Stop Time 1330  -      OT Time Calculation (min) 40 min  -      Total Timed Code Minutes- OT 40 minute(s)  -      OT Received On 12/07/22  -          Timed Charges    23543 - OT Therapeutic Exercise Minutes 18  -KP         Total Minutes    Timed Charges Total Minutes 18  -KP       Total Minutes 18  -KP            User Key  (r) = Recorded By, (t) = Taken By, (c) = Cosigned By    Initials Name Provider Type    Linn Richardson OT Occupational Therapist              Therapy Charges for Today     Code Description Service Date Service Provider Modifiers Qty    94669258536 HC OT THER PROC EA 15 MIN 12/6/2022 Linn Quevedo OT GO 1    36497224595 HC OT THER PROC EA 15 MIN 12/7/2022 Linn Quevedo OT GO 1               Linn Quevedo OT  12/7/2022

## 2022-12-08 PROCEDURE — 99308 SBSQ NF CARE LOW MDM 20: CPT | Performed by: INTERNAL MEDICINE

## 2022-12-08 PROCEDURE — 97530 THERAPEUTIC ACTIVITIES: CPT

## 2022-12-08 RX ADMIN — APIXABAN 5 MG: 5 TABLET, FILM COATED ORAL at 09:07

## 2022-12-08 RX ADMIN — HYDROCODONE BITARTRATE AND ACETAMINOPHEN 1 TABLET: 7.5; 325 TABLET ORAL at 21:29

## 2022-12-08 RX ADMIN — Medication 10 ML: at 21:31

## 2022-12-08 RX ADMIN — METOPROLOL TARTRATE 25 MG: 25 TABLET, FILM COATED ORAL at 09:08

## 2022-12-08 RX ADMIN — DICLOFENAC 4 G: 10 GEL TOPICAL at 17:32

## 2022-12-08 RX ADMIN — HYDRALAZINE HYDROCHLORIDE 5 MG: 25 TABLET, FILM COATED ORAL at 21:30

## 2022-12-08 RX ADMIN — METOPROLOL TARTRATE 25 MG: 25 TABLET, FILM COATED ORAL at 21:30

## 2022-12-08 RX ADMIN — Medication 1 TABLET: at 09:08

## 2022-12-08 RX ADMIN — Medication 10 MG: at 21:29

## 2022-12-08 RX ADMIN — FUROSEMIDE 40 MG: 40 TABLET ORAL at 17:30

## 2022-12-08 RX ADMIN — HYDROCODONE BITARTRATE AND ACETAMINOPHEN 1 TABLET: 7.5; 325 TABLET ORAL at 09:07

## 2022-12-08 RX ADMIN — TAMSULOSIN HYDROCHLORIDE 0.4 MG: 0.4 CAPSULE ORAL at 09:08

## 2022-12-08 RX ADMIN — Medication 10 ML: at 09:08

## 2022-12-08 RX ADMIN — HYDRALAZINE HYDROCHLORIDE 5 MG: 25 TABLET, FILM COATED ORAL at 14:52

## 2022-12-08 RX ADMIN — DICLOFENAC 4 G: 10 GEL TOPICAL at 12:21

## 2022-12-08 RX ADMIN — PANTOPRAZOLE SODIUM 40 MG: 40 TABLET, DELAYED RELEASE ORAL at 05:05

## 2022-12-08 RX ADMIN — ESCITALOPRAM 10 MG: 10 TABLET, FILM COATED ORAL at 09:08

## 2022-12-08 RX ADMIN — GABAPENTIN 300 MG: 300 CAPSULE ORAL at 21:29

## 2022-12-08 RX ADMIN — FUROSEMIDE 40 MG: 40 TABLET ORAL at 09:08

## 2022-12-08 RX ADMIN — APIXABAN 5 MG: 5 TABLET, FILM COATED ORAL at 21:30

## 2022-12-08 RX ADMIN — DICLOFENAC 4 G: 10 GEL TOPICAL at 09:08

## 2022-12-08 RX ADMIN — DICLOFENAC 4 G: 10 GEL TOPICAL at 21:30

## 2022-12-08 NOTE — PLAN OF CARE
Goal Outcome Evaluation:  Plan of Care Reviewed With: patient        Progress: no change  Outcome Evaluation: Pt ambulated to bedside chair and tolerated well. Pt used BSC. No complaints or acute changes noted at this time. VSS. Will continue to follow plan of care.

## 2022-12-08 NOTE — CASE MANAGEMENT/SOCIAL WORK
Discharge Planning Assessment  [facility]     Patient Name: Tre Kinney  MRN: [id number]  Today's Date: 12/8/2022    Admit Date: 10/5/2022     Discharge Plan     Row Name 12/08/22 1331       Plan    Plan Pt was admitted to swing bed on 10/5/22. Pt has been approved for additional swing bed days. Swing bed RN is not available today. SS will discuss approval of additional swing bed days with the swing bed RN tomorrow. SS to follow.              Continued Care and Services - Admitted Since 10/5/2022     Destination     Service Provider Request Status Selected Services Address Phone Fax Patient Preferred    [facility] Accepted N/A [address] [place] [address] [phone] --    [facility] Pending - Request Sent N/A [address] [place] [id number] [phone] --    [facility] Declined  Does not have a secondary payor source N/A [address] [place] [phone] --            Selected Continued Care - Prior Encounters Includes continued care and service providers with selected services from prior encounters from 7/7/2022 to 12/8/2022    Discharged on 10/5/2022 Admission date: 9/19/2022 - Discharge disposition: Swing Bed    Destination     Service Provider Selected Services Address Phone Fax Patient Preferred    [facility] [address] [phone] -- --                    LENORE Koch

## 2022-12-08 NOTE — SIGNIFICANT NOTE
12/08/22 1410   OTHER   Discipline physical therapist   Rehab Time/Intention   Session Not Performed other (see comments)  (Pt on hold per nursing staff while awaiting doppler of RLE)

## 2022-12-08 NOTE — PLAN OF CARE
Goal Outcome Evaluation:              Outcome Evaluation: Patient has rested in bed this shift, no acute changes, no complaints or request a this time, VSS, ambulated to [facility] with x2 assist, Will continue plan of care.

## 2022-12-08 NOTE — THERAPY TREATMENT NOTE
Acute Care - Occupational Therapy Treatment Note   [place]     Patient Name: Tre Kinney  : 1932  MRN: [id number]  Today's Date: 2022  Onset of Illness/Injury or Date of Surgery: 10/06/22     Referring Physician: Janneth    Admit Date: 10/5/2022     No diagnosis found.  Patient Active Problem List   Diagnosis   • Acid reflux   • Ankle arthralgia   • Cardiac conduction disorder   • Closed fracture of distal fibula   • Arteriosclerosis of coronary artery   • Fracture of distal end of tibia   • Elevated cholesterol   • BP (high blood pressure)   • Hypertrophic polyarthritis   • OP (osteoporosis)   • Right heart failure (HCC)   • Heart failure, chronic, right-sided (HCC)   • SOB (shortness of breath)   • Palpitations   • CHF (congestive heart failure) (HCC)   • Congestive heart failure (HCC)   • Precordial pain   • Chronic anticoagulation   • Hypokalemia   • Anemia, unspecified type   • Generalized weakness     Past Medical History:   Diagnosis Date   • Anemia    • Arthritis    • CHF (congestive heart failure) ([facility])    • Chronic kidney disease    • Coronary artery disease    • H/O blood clots    • History of transfusion     no reaction    • Hypertension      Past Surgical History:   Procedure Laterality Date   • ABDOMINAL SURGERY     • APPENDECTOMY     • CARDIAC SURGERY     •  SECTION     • COLONOSCOPY N/A 3/7/2018    Procedure: COLONOSCOPY;  Surgeon: Willie Gresham MD;  Location:  [facility];  Service:    • CORONARY ANGIOPLASTY WITH STENT PLACEMENT     • ENDOSCOPY N/A 3/6/2018    Procedure: ESOPHAGOGASTRODUODENOSCOPY;  Surgeon: Willie Gresham MD;  Location:  [facility];  Service:    • PACEMAKER IMPLANTATION      left side    • SKIN BIOPSY     • TUBAL ABDOMINAL LIGATION           OT ASSESSMENT FLOWSHEET (last 12 hours)     OT Evaluation and Treatment     Row Name 22 1416                   OT Time and Intention    Document Type therapy note (daily note)  -KR        Mode of  Treatment occupational therapy  -KR        Patient Effort adequate  -KR        Comment Pt on hold for mobility training/LE ther ex. Nursing agreed for UE activity to be performed as tolerated. OT saw pt for UE ther ex from supported seated in bed. Pt tolerated activity adequately at this time. SonWillian contacted via [employer]Ga[employer] and informed of current limitations due to pending tests LE. OT to continue as tolerated. Pt continues alert/cooperative and motivated to improve fxl status.  -KR           Shoulder (Therapeutic Exercise)    Shoulder AROM (Therapeutic Exercise) bilateral;flexion;extension;10 repetitions;3 sets  -KR        Shoulder Strengthening (Therapeutic Exercise) bilateral;flexion;extension;aBduction;aDduction;resistance band;yellow;5 repetitions;2 sets  -KR           Elbow/Forearm (Therapeutic Exercise)    Elbow/Forearm (Therapeutic Exercise) AROM (active range of motion)  -KR        Elbow/Forearm AROM (Therapeutic Exercise) bilateral;flexion;extension  -KR        Elbow/Forearm Strengthening (Therapeutic Exercise) bilateral;flexion;extension;resistance band;yellow;5 repetitions;2 sets  -KR           Hand (Therapeutic Exercise)    Hand Strengthening (Therapeutic Exercise) bilateral; strengthening;resistance band;yellow  -KR           Plan of Care Review    Plan of Care Reviewed With patient;son  -KR              User Key  (r) = Recorded By, (t) = Taken By, (c) = Cosigned By    Initials Name Effective Dates    KR Andres Uriarte, OT 06/16/21 -                        OT Recommendation and Plan     Progress Toward Functional Goals (OT): progress toward functional goals is gradual  Plan of Care Review  Plan of Care Reviewed With: patient, son  Progress: improving  Plan of Care Reviewed With: patient, son     Outcome Measures     Row Name 12/08/22 1421 12/07/22 1506 12/05/22 1440       How much help from another is currently needed...    Putting on and taking off regular lower body clothing? 2  -KR 2  -KP 2   -KR    Bathing (including washing, rinsing, and drying) 2  -KR 2  -KP 2  -KR    Toileting (which includes using toilet bed pan or urinal) 2  -KR 3  -KP 3  -KR    Putting on and taking off regular upper body clothing 3  -KR 3  -KP 3  -KR    Taking care of personal grooming (such as brushing teeth) 4  -KR 4  -KP 4  -KR    Eating meals 4  -KR 4  -KP 4  -KR    AM-PAC 6 Clicks Score (OT) 17  -KR 18  -KP 18  -KR          User Key  (r) = Recorded By, (t) = Taken By, (c) = Cosigned By    Initials Name Provider Type    Andres Ayala OT Occupational Therapist    Linn Richardson OT Occupational Therapist                Time Calculation:    Time Calculation- OT     Row Name 12/08/22 1421             Time Calculation- OT    Total Timed Code Minutes- OT 30 minute(s)  -KR            User Key  (r) = Recorded By, (t) = Taken By, (c) = Cosigned By    Initials Name Provider Type    Andrse Ayala OT Occupational Therapist              Therapy Charges for Today     Code Description Service Date Service Provider Modifiers Qty    04545302481  OT THERAPEUTIC ACT EA 15 MIN 12/8/2022 Andres Uriarte OT GO 2               Andres Uriarte OT  12/8/2022

## 2022-12-08 NOTE — PROGRESS NOTES
[facility] HOSPITALIST PROGRESS NOTE    Subjective     History:   Tre Kinney is a 89 y.o. female admitted on 10/5/2022 secondary to <principal problem not specified>     Procedures: None    CC: Follow up debility    Reason for follow up swing bed visit today: Right leg pain    Patient seen and examined with SONAM Quinn. Awake and alert. Reports onset of worsening right leg pain today. No reported fall or trauma. Reports pain from her knee down.  No reported CP, dyspnea or palpitations. No reported nausea or vomiting.  No acute events overnight per RN.     History taken from: patient, chart, and RN.      Objective     Vital Signs  Temp:  [97.8 °F (36.6 °C)-98.5 °F (36.9 °C)] 97.8 °F (36.6 °C)  Heart Rate:  [75-77] 75  Resp:  [18-20] 18  BP: (105-162)/(56-69) 162/69    Intake/Output Summary (Last 24 hours) at 12/8/2022 1824  Last data filed at 12/8/2022 1700  Gross per 24 hour   Intake 840 ml   Output 1100 ml   Net -260 ml         Physical Exam:  General:    Awake, alert, in no acute distress, chronically ill appearing   Heart:      Normal S1 and S2. Regular rate and rhythm. No significant murmur, rubs or gallops appreciated.   Lungs:     Respirations regular, even and unlabored. Lungs clear to auscultation B/L. No wheezes, rales or rhonchi.   Abdomen:   Soft and nontender. No guarding, rebound tenderness or  organomegaly noted. Bowel sounds present x 4.   Extremities:  Trace bilateral pedal edema with mild edema RLE. Moves UE and LE equally B/L with generalized weakness.     Results Review:    Results from last 7 days   Lab Units 12/06/22  0220   WBC 10*3/mm3 6.80   HEMOGLOBIN g/dL 9.5*   PLATELETS 10*3/mm3 200     Results from last 7 days   Lab Units 12/06/22 0220   SODIUM mmol/L 139   POTASSIUM mmol/L 3.8   CHLORIDE mmol/L 102   CO2 mmol/L 26.1   BUN mg/dL 33*   CREATININE mg/dL 1.92*   CALCIUM mg/dL 8.8   GLUCOSE mg/dL 104*                       Imaging Results (Last 24 Hours)     Procedure  Component Value Units Date/Time    US Venous Doppler Lower Extremity Bilateral (duplex) [[id number]] Collected: 12/08/22 1511     Updated: 12/08/22 1515    Narrative:      EXAM:    US Duplex Bilateral Lower Extremities Veins     EXAM DATE:    12/8/2022 2:23 PM     CLINICAL HISTORY:    Pain, swelling     TECHNIQUE:    Real-time duplex ultrasound scan of the bilateral lower extremity  veins integrating B-mode two-dimensional vascular structure, Doppler  spectral analysis, color flow Doppler imaging and compression.     COMPARISON:    No relevant prior studies available.     FINDINGS:    RIGHT DEEP VEINS:  Unremarkable.  No DVT in the right common femoral,  femoral, proximal deep femoral or popliteal veins.  The veins  demonstrate normal color flow, are normally compressible, with normal  phasic flow and/or augmentation response.       LEFT DEEP VEINS:  No compression of the left superficial femoral vein  mid to distal positive for deep venous thrombosis.       SOFT TISSUES:  No acute findings.  No popliteal cyst.       Impression:        No compression of the left superficial femoral vein mid to distal  positive for deep venous thrombosis.     This report was finalized on 12/8/2022 3:12 PM by Dr. Ion Del Rosario MD.       XR Tibia Fibula 2 View Right [[id number]] Collected: 12/08/22 1219     Updated: 12/08/22 1221    Narrative:      EXAM:    XR Right Tibia and Fibula, 2 Views     EXAM DATE:    12/8/2022 11:54 AM     CLINICAL HISTORY:    pain     TECHNIQUE:    Frontal and lateral views of the right tibia and fibula.     COMPARISON:    No relevant prior studies available.     FINDINGS:    BONES/JOINTS:  Unremarkable.  No acute fracture.  No dislocation.    SOFT TISSUES:  Unremarkable.  No radiopaque foreign body.       Impression:        No acute findings in the right tibia and fibula or surrounding soft  tissues.     This report was finalized on 12/8/2022 12:19 PM by Dr. Ion Del Rosario MD.       XR Knee 1 or 2 View Right  [[id number]] Collected: 12/08/22 1218     Updated: 12/08/22 1221    Narrative:      EXAM:    XR Right Knee, 1 or 2 Views     EXAM DATE:    12/8/2022 11:53 AM     CLINICAL HISTORY:    Pain     TECHNIQUE:    Frontal and/or lateral views of the right knee.     COMPARISON:    No relevant prior studies available.     FINDINGS:    BONES/JOINTS:  Tricompartmental degenerative changes in the knee  consistent with osteoarthritis.  No acute fracture.  No dislocation.    SOFT TISSUES:  Unremarkable.       Impression:        Tricompartmental degenerative changes in the knee consistent with  osteoarthritis.     This report was finalized on 12/8/2022 12:19 PM by Dr. Ion Del Rosario MD.       XR Tibia Fibula 2 View Left [[id number]] Collected: 12/08/22 1218     Updated: 12/08/22 1220    Narrative:      EXAM:    XR Left Tibia and Fibula, 2 Views     EXAM DATE:    12/8/2022 11:54 AM     CLINICAL HISTORY:    Pain     TECHNIQUE:    Frontal and lateral views of the left tibia and fibula.     COMPARISON:    06/12/2022     FINDINGS:    BONES/JOINTS:  Unremarkable.  No acute fracture.  No dislocation.    SOFT TISSUES:  Proximal soft tissue calcifications again noted.  No  radiopaque foreign body.       Impression:        Proximal soft tissue calcifications again noted.     This report was finalized on 12/8/2022 12:18 PM by Dr. Ion Del Rosario MD.               Medications:  apixaban, 5 mg, Oral, Q12H  Diclofenac Sodium, 4 g, Topical, 4x Daily  escitalopram, 10 mg, Oral, Daily  furosemide, 40 mg, Oral, BID  gabapentin, 300 mg, Oral, Nightly  hydrALAZINE, 5 mg, Oral, Q8H  melatonin, 10 mg, Oral, Nightly  metoprolol tartrate, 25 mg, Oral, Q12H  multivitamin, 1 tablet, Oral, Daily  pantoprazole, 40 mg, Oral, Q AM  sodium chloride, 10 mL, Intravenous, Q12H  sodium chloride, 10 mL, Intravenous, Q12H  tamsulosin, 0.4 mg, Oral, Daily      Pharmacy Consult,             Assessment & Plan   Generalized weakness/debility  Diffuse LLE DVT  Right leg  pain  DIANA on CKD IV  Acute blood loss anemia  Traumatic right gluteal hematoma  Mild hypokalemia   Paroxysmal Afib  Hx of SSS s/p pacemaker placement  Essential HTN  Reported hx of DVT's   Anxiety/depression  Advanced age    Plan  Pt reports increased right leg pain today. Obtained venous duplex US with no evidence of DVT in RLE. X-rays reveal degenerative changes consistent with OA. BP stable and controlled today on current regimen. Tolerating Eliquis with no reported signs of bleeding. Pt has been approved for additional swing beds after pt/family's appeal and grievance with insurance company. Cont PT/OT as tolerated.     Disposition: Patient currently approved for additional swing bed and awaiting confirmation from swing bed coordinator regarding number of additional days approved.            Mustapha Loredo, DO  12/08/22  18:24 EST

## 2022-12-09 PROCEDURE — 97530 THERAPEUTIC ACTIVITIES: CPT

## 2022-12-09 PROCEDURE — 99308 SBSQ NF CARE LOW MDM 20: CPT | Performed by: INTERNAL MEDICINE

## 2022-12-09 PROCEDURE — 97116 GAIT TRAINING THERAPY: CPT

## 2022-12-09 PROCEDURE — 97110 THERAPEUTIC EXERCISES: CPT

## 2022-12-09 PROCEDURE — 97535 SELF CARE MNGMENT TRAINING: CPT

## 2022-12-09 RX ADMIN — FUROSEMIDE 40 MG: 40 TABLET ORAL at 08:34

## 2022-12-09 RX ADMIN — TAMSULOSIN HYDROCHLORIDE 0.4 MG: 0.4 CAPSULE ORAL at 08:34

## 2022-12-09 RX ADMIN — Medication 10 ML: at 21:28

## 2022-12-09 RX ADMIN — DICLOFENAC 4 G: 10 GEL TOPICAL at 12:38

## 2022-12-09 RX ADMIN — PANTOPRAZOLE SODIUM 40 MG: 40 TABLET, DELAYED RELEASE ORAL at 05:26

## 2022-12-09 RX ADMIN — ESCITALOPRAM 10 MG: 10 TABLET, FILM COATED ORAL at 08:34

## 2022-12-09 RX ADMIN — APIXABAN 5 MG: 5 TABLET, FILM COATED ORAL at 21:28

## 2022-12-09 RX ADMIN — GABAPENTIN 300 MG: 300 CAPSULE ORAL at 21:28

## 2022-12-09 RX ADMIN — HYDRALAZINE HYDROCHLORIDE 5 MG: 25 TABLET, FILM COATED ORAL at 21:28

## 2022-12-09 RX ADMIN — METOPROLOL TARTRATE 25 MG: 25 TABLET, FILM COATED ORAL at 08:35

## 2022-12-09 RX ADMIN — Medication 10 ML: at 08:35

## 2022-12-09 RX ADMIN — Medication 10 MG: at 21:28

## 2022-12-09 RX ADMIN — DICLOFENAC 4 G: 10 GEL TOPICAL at 08:35

## 2022-12-09 RX ADMIN — DICLOFENAC 4 G: 10 GEL TOPICAL at 18:11

## 2022-12-09 RX ADMIN — METOPROLOL TARTRATE 25 MG: 25 TABLET, FILM COATED ORAL at 21:28

## 2022-12-09 RX ADMIN — Medication 10 ML: at 08:36

## 2022-12-09 RX ADMIN — HYDRALAZINE HYDROCHLORIDE 5 MG: 25 TABLET, FILM COATED ORAL at 05:26

## 2022-12-09 RX ADMIN — FUROSEMIDE 40 MG: 40 TABLET ORAL at 18:11

## 2022-12-09 RX ADMIN — APIXABAN 5 MG: 5 TABLET, FILM COATED ORAL at 08:34

## 2022-12-09 RX ADMIN — Medication 1 TABLET: at 08:34

## 2022-12-09 RX ADMIN — DICLOFENAC 4 G: 10 GEL TOPICAL at 21:28

## 2022-12-09 RX ADMIN — HYDRALAZINE HYDROCHLORIDE 5 MG: 25 TABLET, FILM COATED ORAL at 14:08

## 2022-12-09 NOTE — THERAPY TREATMENT NOTE
Acute Care - Occupational Therapy Treatment Note   [place]     Patient Name: Tre Kinney  : 1932  MRN: [id number]  Today's Date: 2022  Onset of Illness/Injury or Date of Surgery: 10/06/22     Referring Physician: Janneth    Admit Date: 10/5/2022     No diagnosis found.  Patient Active Problem List   Diagnosis   • Acid reflux   • Ankle arthralgia   • Cardiac conduction disorder   • Closed fracture of distal fibula   • Arteriosclerosis of coronary artery   • Fracture of distal end of tibia   • Elevated cholesterol   • BP (high blood pressure)   • Hypertrophic polyarthritis   • OP (osteoporosis)   • Right heart failure (HCC)   • Heart failure, chronic, right-sided (HCC)   • SOB (shortness of breath)   • Palpitations   • CHF (congestive heart failure) (HCC)   • Congestive heart failure (HCC)   • Precordial pain   • Chronic anticoagulation   • Hypokalemia   • Anemia, unspecified type   • Generalized weakness     Past Medical History:   Diagnosis Date   • Anemia    • Arthritis    • CHF (congestive heart failure) ([facility])    • Chronic kidney disease    • Coronary artery disease    • H/O blood clots    • History of transfusion     no reaction    • Hypertension      Past Surgical History:   Procedure Laterality Date   • ABDOMINAL SURGERY     • APPENDECTOMY     • CARDIAC SURGERY     •  SECTION     • COLONOSCOPY N/A 3/7/2018    Procedure: COLONOSCOPY;  Surgeon: Willie Gresham MD;  Location:  [facility];  Service:    • CORONARY ANGIOPLASTY WITH STENT PLACEMENT     • ENDOSCOPY N/A 3/6/2018    Procedure: ESOPHAGOGASTRODUODENOSCOPY;  Surgeon: Willie Gresham MD;  Location:  [facility];  Service:    • PACEMAKER IMPLANTATION      left side    • SKIN BIOPSY     • TUBAL ABDOMINAL LIGATION           OT ASSESSMENT FLOWSHEET (last 12 hours)     OT Evaluation and Treatment     Row Name 22 1400                   OT Time and Intention    Document Type therapy note (daily note)  -KR        Mode of  Treatment occupational therapy  -KR        Patient Effort good  -KR        Comment OT assisted PT in assessment/performance of mobility sit/stand/ambulation. Min assist sit/stand. Following mobility training, pt allowed to rest prior to beginning/completing BUE ther ex from seated in chair at bedside. Pt tolerated activity well and remains motivated to improve general fxl status.  -KR           Shoulder (Therapeutic Exercise)    Shoulder AROM (Therapeutic Exercise) bilateral;flexion;extension;aBduction;aDduction;sitting;10 repetitions;3 sets  -KR        Shoulder Strengthening (Therapeutic Exercise) bilateral;flexion;extension;aBduction;aDduction;resistance band;yellow  -KR           Elbow/Forearm (Therapeutic Exercise)    Elbow/Forearm AROM (Therapeutic Exercise) bilateral;flexion;extension;10 repetitions;3 sets  -KR        Elbow/Forearm Strengthening (Therapeutic Exercise) bilateral;flexion;extension;resistance band;yellow  -KR           Hand (Therapeutic Exercise)    Hand Strengthening (Therapeutic Exercise) bilateral; strengthening  -KR              User Key  (r) = Recorded By, (t) = Taken By, (c) = Cosigned By    Initials Name Effective Dates    KR Andres Uriarte, OT 06/16/21 -                        OT Recommendation and Plan     Progress Toward Functional Goals (OT): progress toward functional goals is gradual  Plan of Care Review  Plan of Care Reviewed With: patient, son  Progress: improving  Plan of Care Reviewed With: patient, son     Outcome Measures     Row Name 12/09/22 1637 12/08/22 1421 12/07/22 1506       How much help from another is currently needed...    Putting on and taking off regular lower body clothing? 2  -KR 2  -KR 2  -KP    Bathing (including washing, rinsing, and drying) 2  -KR 2  -KR 2  -KP    Toileting (which includes using toilet bed pan or urinal) 3  -KR 2  -KR 3  -KP    Putting on and taking off regular upper body clothing 3  -KR 3  -KR 3  -KP    Taking care of personal grooming  (such as brushing teeth) 3  -KR 4  -KR 4  -KP    Eating meals 4  -KR 4  -KR 4  -KP    AM-PAC 6 Clicks Score (OT) 17  -KR 17  -KR 18  -KP          User Key  (r) = Recorded By, (t) = Taken By, (c) = Cosigned By    Initials Name Provider Type    Andres Ayala OT Occupational Therapist    Linn Richardson, OT Occupational Therapist                Time Calculation:    Time Calculation- OT     Row Name 12/09/22 1638             Time Calculation- OT    Total Timed Code Minutes- OT 45 minute(s)  -KR            User Key  (r) = Recorded By, (t) = Taken By, (c) = Cosigned By    Initials Name Provider Type    Andres Ayala OT Occupational Therapist              Therapy Charges for Today     Code Description Service Date Service Provider Modifiers Qty    22919819850 HC OT THERAPEUTIC ACT EA 15 MIN 12/8/2022 Andres Uriarte OT GO 2    95006102096 HC OT THERAPEUTIC ACT EA 15 MIN 12/9/2022 Andres Uriarte OT GO 2    83854125832 HC OT SELF CARE/MGMT/TRAIN EA 15 MIN 12/9/2022 Andres Uriarte OT GO 1               Andres Uriarte OT  12/9/2022

## 2022-12-09 NOTE — PAYOR COMM NOTE
"Michelet Jacques RN  Swing Bed Nurse  [phone] Ex [phone] FAX: [phone]  [email or web address]  [facility]  NPI [id number]  Reference Number [id number]      Clinical Updates for  per KINGA crowe        Tre Kinney (89 y.o. Female)    YOB: 1932  Social Security Number:   Address: [address] [place] [address]  Home Phone: [phone]  MRN: [id number]  Zoroastrian: Hindu  Marital Status:         Admission Date: 10/5/22  Admission Type: Urgent  Admitting Provider: Jannet Lagunas MD  Attending Provider: Mustapha Loredo DO  Department, Room/Bed: [place] [address]0104/  Discharge Date:   Discharge Disposition:   Discharge Destination:               Attending Provider: Mustapha Loredo DO    Allergies: Adhesive Tape, Xanax [Alprazolam]    Isolation: None   Infection: None   Code Status: No CPR    Ht: 170.2 cm (67.01\")   Wt: 78.5 kg (173 lb 1.6 oz)    Admission Cmt: None   Principal Problem: None                Active Insurance as of 10/5/2022     Primary Coverage     Payor Plan Insurance Group Employer/Plan Group    ANTH MEDICARE REPLACEMENT ANTH MEDICARE ADVANTAGE [id number]     Payor Plan Address Payor Plan Phone Number Payor Plan Fax Number Effective Dates    [address] [phone]  1/1/2022 - None Entered    [facility] [id number]       Subscriber Name Subscriber Birth Date Member ID       TRE KINNEY 12/9/1932 [id number]                 Emergency Contacts      (Rel.) Home Phone Work Phone Mobile Phone    Valdez Kinney (Son) [phone] None None    Leno Kinney (Son) [phone] None [phone]    Willian Kinney (Son) None None [phone]              Current Facility-Administered Medications   Medication Dose Route Frequency Provider Last Rate Last Admin   • acetaminophen (TYLENOL) tablet 650 mg  650 mg Oral Q6H PRN Mustapha Loredo DO   650 mg at 11/26/22 0906   • " apixaban (ELIQUIS) tablet 5 mg  5 mg Oral Q12H Mustapha Loredo DO   5 mg at 12/08/22 2130   • benzocaine-menthol (DERMOPLAST) 20-0.5 % topical spray   Topical 4x Daily PRN Kennedi Saucedo MD   Given at 11/03/22 0548   • Diclofenac Sodium (VOLTAREN) 1 % gel 4 g  4 g Topical 4x Daily Jannet Lagunas MD   4 g at 12/08/22 2130   • docusate sodium (COLACE) capsule 100 mg  100 mg Oral BID PRN Kennedi Saucedo MD   100 mg at 11/26/22 0909   • escitalopram (LEXAPRO) tablet 10 mg  10 mg Oral Daily Jannet Lagunas MD   10 mg at 12/08/22 0908   • furosemide (LASIX) tablet 40 mg  40 mg Oral BID Jannet Lagunas MD   40 mg at 12/08/22 1730   • gabapentin (NEURONTIN) capsule 300 mg  300 mg Oral Nightly Jannet Lagunas MD   300 mg at 12/08/22 2129   • guaiFENesin-dextromethorphan (ROBITUSSIN DM) 100-10 MG/5ML syrup 5 mL  5 mL Oral Q4H PRN Kennedi Saucedo MD       • hydrALAZINE (APRESOLINE) injection 10 mg  10 mg Intravenous Q6H PRN Mustapha Loredo DO       • hydrALAZINE (APRESOLINE) tablet 5 mg  5 mg Oral Q8H Jannet Lagunas MD   5 mg at 12/09/22 0526   • HYDROcodone-acetaminophen (NORCO) 7.5-325 MG per tablet 1 tablet  1 tablet Oral Q8H PRN Jannet Lagunas MD   1 tablet at 12/08/22 2129   • Magnesium Sulfate 2 gram Bolus, followed by 8 gram infusion (total Mg dose 10 grams)- Mg less than or equal to 1mg/dL  2 g Intravenous PRN Jannet Lagunas MD        Or   • Magnesium Sulfate 2 gram / 50mL Infusion (GIVE X 3 BAGS TO EQUAL 6GM TOTAL DOSE) - Mg 1.1 - 1.5 mg/dl  2 g Intravenous PRN Jannet Lagunas MD        Or   • Magnesium Sulfate 4 gram infusion- Mg 1.6-1.9 mg/dL  4 g Intravenous PRN Jannet Lagunas MD       • melatonin tablet 10 mg  10 mg Oral Nightly Jannet Lagunas MD   10 mg at 12/08/22 2129   • metoprolol tartrate (LOPRESSOR) tablet 25 mg  25 mg Oral Q12H Dante Levine MD   25 mg at 12/08/22 2130   • multivitamin (THERAGRAN) tablet 1 tablet  1 tablet Oral Daily  Jannet Lagunas MD   1 tablet at 12/08/22 0908   • ondansetron (ZOFRAN) injection 4 mg  4 mg Intravenous Q6H PRN Jannet Lagunas MD       • pantoprazole (PROTONIX) EC tablet 40 mg  40 mg Oral Q AM Jannet Lagunas MD   40 mg at 12/09/22 0526   • Pharmacy Consult   Does not apply Continuous PRN Mustapha Loredo DO       • phenol (CHLORASEPTIC) 1.4 % liquid 1 spray  1 spray Mouth/Throat Q2H PRN Jannet Lagunas MD   1 spray at 10/13/22 2056   • polyethylene glycol (MIRALAX) packet 17 g  17 g Oral BID PRN Jannet Lagunas MD       • potassium chloride (K-DUR,KLOR-CON) CR tablet 40 mEq  40 mEq Oral PRN Jannet Lagunas MD        Or   • potassium chloride (KLOR-CON) packet 40 mEq  40 mEq Oral PRN Jannet Lagunas MD        Or   • potassium chloride 10 mEq in 100 mL IVPB  10 mEq Intravenous Q1H PRN Jannet Lagunas MD       • prochlorperazine (COMPAZINE) injection 5 mg  5 mg Intravenous Q6H PRN Jannet Lagunas MD        Or   • prochlorperazine (COMPAZINE) tablet 5 mg  5 mg Oral Q6H PRN Jannet Lagunas MD        Or   • prochlorperazine (COMPAZINE) suppository 25 mg  25 mg Rectal Q12H PRN Jannet Lagunas MD       • promethazine (PHENERGAN) 6.25 mg in sodium chloride 0.9 % 50 mL  6.25 mg Intravenous Q6H PRN Jannet Lagunas MD       • sodium chloride 0.9 % flush 10 mL  10 mL Intravenous PRN Jannet Lagunas MD       • sodium chloride 0.9 % flush 10 mL  10 mL Intravenous Q12H Jannet Lagunas MD   10 mL at 12/08/22 2131   • sodium chloride 0.9 % flush 10 mL  10 mL Intravenous PRN Jannet Lagunas MD       • sodium chloride 0.9 % flush 10 mL  10 mL Intravenous Q12H Jannet Lagunas MD   10 mL at 12/08/22 2131   • sodium chloride 0.9 % flush 10 mL  10 mL Intravenous PRN Jannet Lagunas MD       • tamsulosin (FLOMAX) 24 hr capsule 0.4 mg  0.4 mg Oral Daily Jannet Lagunas MD   0.4 mg at 12/08/22 0908        Physician Progress Notes (last 48 hours)      Mustapha Loredo, DO  at 12/08/22 1824                  [facility] HOSPITALIST PROGRESS NOTE    Subjective     History:   Tre Kinney is a 89 y.o. female admitted on 10/5/2022 secondary to <principal problem not specified>     Procedures: None    CC: Follow up debility    Reason for follow up swing bed visit today: Right leg pain    Patient seen and examined with SONAM Quinn. Awake and alert. Reports onset of worsening right leg pain today. No reported fall or trauma. Reports pain from her knee down.  No reported CP, dyspnea or palpitations. No reported nausea or vomiting.  No acute events overnight per RN.     History taken from: patient, chart, and RN.      Objective     Vital Signs  Temp:  [97.8 °F (36.6 °C)-98.5 °F (36.9 °C)] 97.8 °F (36.6 °C)  Heart Rate:  [75-77] 75  Resp:  [18-20] 18  BP: (105-162)/(56-69) 162/69    Intake/Output Summary (Last 24 hours) at 12/8/2022 1824  Last data filed at 12/8/2022 1700  Gross per 24 hour   Intake 840 ml   Output 1100 ml   Net -260 ml         Physical Exam:  General:    Awake, alert, in no acute distress, chronically ill appearing   Heart:      Normal S1 and S2. Regular rate and rhythm. No significant murmur, rubs or gallops appreciated.   Lungs:     Respirations regular, even and unlabored. Lungs clear to auscultation B/L. No wheezes, rales or rhonchi.   Abdomen:   Soft and nontender. No guarding, rebound tenderness or  organomegaly noted. Bowel sounds present x 4.   Extremities:  Trace bilateral pedal edema with mild edema RLE. Moves UE and LE equally B/L with generalized weakness.     Results Review:    Results from last 7 days   Lab Units 12/06/22  0220   WBC 10*3/mm3 6.80   HEMOGLOBIN g/dL 9.5*   PLATELETS 10*3/mm3 200     Results from last 7 days   Lab Units 12/06/22  0220   SODIUM mmol/L 139   POTASSIUM mmol/L 3.8   CHLORIDE mmol/L 102   CO2 mmol/L 26.1   BUN mg/dL 33*   CREATININE mg/dL 1.92*   CALCIUM mg/dL 8.8   GLUCOSE mg/dL 104*                       Imaging Results (Last  24 Hours)     Procedure Component Value Units Date/Time    US Venous Doppler Lower Extremity Bilateral (duplex) [[id number]] Collected: 12/08/22 1511     Updated: 12/08/22 1515    Narrative:      EXAM:    US Duplex Bilateral Lower Extremities Veins     EXAM DATE:    12/8/2022 2:23 PM     CLINICAL HISTORY:    Pain, swelling     TECHNIQUE:    Real-time duplex ultrasound scan of the bilateral lower extremity  veins integrating B-mode two-dimensional vascular structure, Doppler  spectral analysis, color flow Doppler imaging and compression.     COMPARISON:    No relevant prior studies available.     FINDINGS:    RIGHT DEEP VEINS:  Unremarkable.  No DVT in the right common femoral,  femoral, proximal deep femoral or popliteal veins.  The veins  demonstrate normal color flow, are normally compressible, with normal  phasic flow and/or augmentation response.       LEFT DEEP VEINS:  No compression of the left superficial femoral vein  mid to distal positive for deep venous thrombosis.       SOFT TISSUES:  No acute findings.  No popliteal cyst.       Impression:        No compression of the left superficial femoral vein mid to distal  positive for deep venous thrombosis.     This report was finalized on 12/8/2022 3:12 PM by Dr. Ion Del Rosario MD.       XR Tibia Fibula 2 View Right [[id number]] Collected: 12/08/22 1219     Updated: 12/08/22 1221    Narrative:      EXAM:    XR Right Tibia and Fibula, 2 Views     EXAM DATE:    12/8/2022 11:54 AM     CLINICAL HISTORY:    pain     TECHNIQUE:    Frontal and lateral views of the right tibia and fibula.     COMPARISON:    No relevant prior studies available.     FINDINGS:    BONES/JOINTS:  Unremarkable.  No acute fracture.  No dislocation.    SOFT TISSUES:  Unremarkable.  No radiopaque foreign body.       Impression:        No acute findings in the right tibia and fibula or surrounding soft  tissues.     This report was finalized on 12/8/2022 12:19 PM by Dr. Ion Del Rosario MD.       XR  Knee 1 or 2 View Right [[id number]] Collected: 12/08/22 1218     Updated: 12/08/22 1221    Narrative:      EXAM:    XR Right Knee, 1 or 2 Views     EXAM DATE:    12/8/2022 11:53 AM     CLINICAL HISTORY:    Pain     TECHNIQUE:    Frontal and/or lateral views of the right knee.     COMPARISON:    No relevant prior studies available.     FINDINGS:    BONES/JOINTS:  Tricompartmental degenerative changes in the knee  consistent with osteoarthritis.  No acute fracture.  No dislocation.    SOFT TISSUES:  Unremarkable.       Impression:        Tricompartmental degenerative changes in the knee consistent with  osteoarthritis.     This report was finalized on 12/8/2022 12:19 PM by Dr. Ion Del Rosario MD.       XR Tibia Fibula 2 View Left [[id number]] Collected: 12/08/22 1218     Updated: 12/08/22 1220    Narrative:      EXAM:    XR Left Tibia and Fibula, 2 Views     EXAM DATE:    12/8/2022 11:54 AM     CLINICAL HISTORY:    Pain     TECHNIQUE:    Frontal and lateral views of the left tibia and fibula.     COMPARISON:    06/12/2022     FINDINGS:    BONES/JOINTS:  Unremarkable.  No acute fracture.  No dislocation.    SOFT TISSUES:  Proximal soft tissue calcifications again noted.  No  radiopaque foreign body.       Impression:        Proximal soft tissue calcifications again noted.     This report was finalized on 12/8/2022 12:18 PM by Dr. Ion Del Rosario MD.               Medications:  apixaban, 5 mg, Oral, Q12H  Diclofenac Sodium, 4 g, Topical, 4x Daily  escitalopram, 10 mg, Oral, Daily  furosemide, 40 mg, Oral, BID  gabapentin, 300 mg, Oral, Nightly  hydrALAZINE, 5 mg, Oral, Q8H  melatonin, 10 mg, Oral, Nightly  metoprolol tartrate, 25 mg, Oral, Q12H  multivitamin, 1 tablet, Oral, Daily  pantoprazole, 40 mg, Oral, Q AM  sodium chloride, 10 mL, Intravenous, Q12H  sodium chloride, 10 mL, Intravenous, Q12H  tamsulosin, 0.4 mg, Oral, Daily      Pharmacy Consult,             Assessment & Plan   Generalized weakness/debility  Diffuse LLE  DVT  Right leg pain  DIANA on CKD IV  Acute blood loss anemia  Traumatic right gluteal hematoma  Mild hypokalemia   Paroxysmal Afib  Hx of SSS s/p pacemaker placement  Essential HTN  Reported hx of DVT's   Anxiety/depression  Advanced age    Plan  Pt reports increased right leg pain today. Obtained venous duplex US with no evidence of DVT in RLE. X-rays reveal degenerative changes consistent with OA. BP stable and controlled today on current regimen. Tolerating Eliquis with no reported signs of bleeding. Pt has been approved for additional swing beds after pt/family's appeal and grievance with insurance company. Cont PT/OT as tolerated.     Disposition: Patient currently approved for additional swing bed and awaiting confirmation from swing bed coordinator regarding number of additional days approved.            Mustapha Loredo DO  12/08/22  18:24 EST    Electronically signed by Mustapha Loredo DO at 12/08/22 1829     Mustapha Loredo DO at 12/07/22 1659        Pt admitted to swing bed on 10/5 for additional therapy in the setting of generalized weakness/debility. Pt not seen or examined today. I have reviewed her VS. I have discussed her care with SONAM Tenorio who does not report any acute events or issues that need to be addressed. S/P a dose of Diflucan for suspected vaginitis. BP stable today. Cont current antihypertensive regimen. Cont Eliquis for treatment of DVT. Cont PT/OT. Pt currently approved for swing bed through today. Discharge appeal currently pending.         Mustapha Loredo DO  12/07/22  15:52 EDT                Electronically signed by Mustapha Loredo DO at 12/07/22 1704          Consult Notes (most recent note)      Andrew Chapman MD at 11/03/22 1249      Consult Orders    1. Inpatient Psychiatrist Consult [[id number]] ordered by Kennedi Saucedo MD at 11/03/22 0744    2. Inpatient Psychiatrist Consult [[id number]] ordered by Kennedi Saucedo MD at  10/31/22 1147                   Referring Provider: Lewis  Reason for Consultation: Capacity      Chief complaint/Focus of Exam: Capacity Evaluation     Subjective .     History of present illness: Patient is an 89-year-old female with a history significant for CHF, CKD, CAD, history of clots, chronic anticoagulation, sick sinus syndrome with pacer placement, chronic diastolic CHF, anemia, and depression who originally admitted to the hospital after a fall.  Psychiatry was originally consulted early in her hospitalization 1 month ago for depressive symptoms and for medication management in light of hyponatremia.  I evaluated patient at that time.  Psychiatry was reconsulted to evaluate patient for medical decision-making capacity as there have been some concerns about treatment care planning going forward and family has been involved.  I evaluated patient at bedside today while she was eating lunch.  She was sitting up and reported that she was having some neck pain but denied any other complaints.  She was alert and oriented x4.  She is able to answer all my questions appropriately.  She was not experiencing any confusion, hallucinations, delusions, or paranoia.  She denied SI/HI/AVH.  She was making jokes about being in the hospital and seemed to be in good spirits despite her neck pain.    Review of Systems  Pertinent items are noted in HPI    History  Past Medical History:   Diagnosis Date   • Anemia    • Arthritis    • CHF (congestive heart failure) (HCC)    • Chronic kidney disease    • Coronary artery disease    • H/O blood clots    • History of transfusion     no reaction    • Hypertension    ,   Past Surgical History:   Procedure Laterality Date   • ABDOMINAL SURGERY     • APPENDECTOMY     • CARDIAC SURGERY     •  SECTION     • COLONOSCOPY N/A 3/7/2018    Procedure: COLONOSCOPY;  Surgeon: Willie Gresham MD;  Location: [facility] ENDOSCOPY;  Service:    • CORONARY ANGIOPLASTY WITH STENT PLACEMENT     •  ENDOSCOPY N/A 3/6/2018    Procedure: ESOPHAGOGASTRODUODENOSCOPY;  Surgeon: Willie Gresham MD;  Location: [facility] ENDOSCOPY;  Service:    • PACEMAKER IMPLANTATION      left side    • SKIN BIOPSY     • TUBAL ABDOMINAL LIGATION     ,   Family History   Problem Relation Age of Onset   • Other Mother         cardiac disorder   • Hypertension Mother    • Heart disease Mother    • Thyroid disease Mother    • Other Father         cardiac disorder   • Heart disease Father    • Hypertension Father    • Other Sister         cardiac disorder   • Other Brother         cardiac disorder   • Other Other         cardiac disorder   • Other Other         cardiac disorder   ,   Social History     Socioeconomic History   • Marital status:    Tobacco Use   • Smoking status: Never   Substance and Sexual Activity   • Alcohol use: No   • Drug use: No   • Sexual activity: Defer     E-cigarette/Vaping     E-cigarette/Vaping Substances     E-cigarette/Vaping Devices       ,   Medications Prior to Admission   Medication Sig Dispense Refill Last Dose   • ALPRAZolam (XANAX) 1 MG tablet Take 1 mg by mouth 2 (Two) Times a Day As Needed for Anxiety.   Unknown at Unknown time   • aspirin 81 MG EC tablet Take 81 mg by mouth Daily.   Unknown at Unknown time   • escitalopram (LEXAPRO) 10 MG tablet Take 10 mg by mouth Daily.   Unknown at Unknown time   • furosemide (LASIX) 40 MG tablet Take 40 mg by mouth Daily.   Unknown at Unknown time   • gabapentin (NEURONTIN) 400 MG capsule Take 400 mg by mouth Every Night.   Unknown at Unknown time   • hydrALAZINE (APRESOLINE) 100 MG tablet Take 1 tablet by mouth Every 8 (Eight) Hours. 90 tablet 0 Unknown at Unknown time   • HYDROcodone-acetaminophen (NORCO) 7.5-325 MG per tablet Take 1 tablet by mouth Every 8 (Eight) Hours As Needed for Moderate Pain.   Unknown at Unknown time   • isosorbide mononitrate (IMDUR) 60 MG 24 hr tablet Take 60 mg by mouth Daily.   Unknown at Unknown time   • metoprolol tartrate  (LOPRESSOR) 50 MG tablet Take 50 mg by mouth 2 (Two) Times a Day.   Unknown at Unknown time   • pantoprazole (PROTONIX) 40 MG EC tablet Take 40 mg by mouth Daily.   Unknown at Unknown time   • potassium chloride (MICRO-K) 10 MEQ CR capsule Take 10 mEq by mouth 2 (Two) Times a Day.   Unknown at Unknown time   • warfarin (COUMADIN) 6 MG tablet Take 6 mg by mouth Daily.   Unknown at Unknown time   , Scheduled Meds:  apixaban, 5 mg, Oral, Q12H  escitalopram, 10 mg, Oral, Daily  furosemide, 40 mg, Oral, BID  gabapentin, 300 mg, Oral, Nightly  hydrALAZINE, 5 mg, Oral, Q8H  melatonin, 10 mg, Oral, Nightly  metoprolol tartrate, 12.5 mg, Oral, Q12H  multivitamin, 1 tablet, Oral, Daily  pantoprazole, 40 mg, Oral, Q AM  sodium chloride, 10 mL, Intravenous, Q12H  sodium chloride, 10 mL, Intravenous, Q12H  tamsulosin, 0.4 mg, Oral, Daily    , Continuous Infusions:  Pharmacy Consult,     , PRN Meds:  •  benzocaine-menthol  •  docusate sodium  •  HYDROcodone-acetaminophen  •  magnesium sulfate **OR** magnesium sulfate **OR** magnesium sulfate  •  ondansetron  •  Pharmacy Consult  •  phenol  •  polyethylene glycol  •  potassium chloride **OR** potassium chloride **OR** potassium chloride  •  prochlorperazine **OR** prochlorperazine **OR** prochlorperazine  •  promethazine  •  sodium chloride  •  sodium chloride  •  sodium chloride and Allergies:  Adhesive tape and Xanax [alprazolam]    Objective     Vital Signs   Temp:  [98.2 °F (36.8 °C)-98.4 °F (36.9 °C)] 98.2 °F (36.8 °C)  Heart Rate:  [71-77] 75  Resp:  [16-18] 16  BP: (129-161)/() 161/116    Mental Status Exam:   Mental Status Exam:    Hygiene:   good  Cooperation:  Cooperative  Eye Contact:  Good  Psychomotor Behavior:  Slow  Affect:  Full range  Hopelessness: Optimistic  Speech:  Normal  Thought Progress:  Goal directed and Linear  Thought Content:  Normal and Mood congruent  Suicidal:  None  Homicidal:  None  Hallucinations:  None  Delusion:  None  Memory:   Intact  Orientation:  Person, Place, Time and Situation  Reliability:  good  Insight:  Fair  Judgement:  Fair  Impulse Control:  Fair    Results Review:   I reviewed the patient's new clinical results.  Lab Results (last 24 hours)     ** No results found for the last 24 hours. **        Imaging Results (Last 24 Hours)     ** No results found for the last 24 hours. **            Assessment & Plan     Evaluation for medical decision making capacity  -At this point in time, patient does have capacity for medical decision making.  She is alert and oriented and able to answer questions appropriately and I do not have any concerns about her making her own treatment plan and decisions going forward.    I discussed the patients findings and my recommendations with patient    Andrew Chapman MD  22  12:49 EDT        Electronically signed by Andrew Chapman MD at 22 1253          Nutrition Notes (most recent note)      aMgi Oneal RD at 22 0822        Nutrition Services    Patient Name:  Tre Kinney  YOB: 1932  MRN: [id number]  Admit Date:  10/5/2022    BMI: 26.37  Diet: Soft to chew, chopped meats, regular  Supplement: Magic cup BID  Intake: 65% over 12 meals  Fluid intake: 1200ml not meeting needs    Electronically signed by:  Magi Oneal RD  22 08:22 EST     Electronically signed by Magi Oneal RD at 22 0823          Physical Therapy Notes (last 48 hours)      Bhargavi Vásquez, PT at 22 1520  Version 1 of 1         Acute Care - Physical Therapy Treatment Note  C[facility]ison     Patient Name: Tre Kinney  : 1932  MRN: [id number]  Today's Date: 2022   Onset of Illness/Injury or Date of Surgery: 10/06/22  Visit Dx:   No diagnosis found.  Patient Active Problem List   Diagnosis   • Acid reflux   • Ankle arthralgia   • Cardiac conduction disorder   • Closed fracture of distal fibula   • Arteriosclerosis of coronary artery   • Fracture of distal end of  tibia   • Elevated cholesterol   • BP (high blood pressure)   • Hypertrophic polyarthritis   • OP (osteoporosis)   • Right heart failure (HCC)   • Heart failure, chronic, right-sided (HCC)   • SOB (shortness of breath)   • Palpitations   • CHF (congestive heart failure) (HCC)   • Congestive heart failure (HCC)   • Precordial pain   • Chronic anticoagulation   • Hypokalemia   • Anemia, unspecified type   • Generalized weakness     Past Medical History:   Diagnosis Date   • Anemia    • Arthritis    • CHF (congestive heart failure) (HCC)    • Chronic kidney disease    • Coronary artery disease    • H/O blood clots    • History of transfusion     no reaction    • Hypertension      Past Surgical History:   Procedure Laterality Date   • ABDOMINAL SURGERY     • APPENDECTOMY     • CARDIAC SURGERY     •  SECTION     • COLONOSCOPY N/A 3/7/2018    Procedure: COLONOSCOPY;  Surgeon: Willie Gresham MD;  Location:  [facility] ENDOSCOPY;  Service:    • CORONARY ANGIOPLASTY WITH STENT PLACEMENT     • ENDOSCOPY N/A 3/6/2018    Procedure: ESOPHAGOGASTRODUODENOSCOPY;  Surgeon: Willie Gresham MD;  Location:  [facility] ENDOSCOPY;  Service:    • PACEMAKER IMPLANTATION      left side    • SKIN BIOPSY     • TUBAL ABDOMINAL LIGATION       PT Assessment (last 12 hours)     PT Evaluation and Treatment     Row Name 22 1250          Physical Therapy Time and Intention    Subjective Information no complaints  -CT     Document Type therapy note (daily note)  -CT     Mode of Treatment physical therapy  -CT     Patient Effort good  -CT     Comment Pts treatment session [id number]. Pelon Dorsey via [employer]. Pt ambulated x 3 attempts this date and performed seated ther ex.  -CT     Row Name 22 1250          General Information    Patient Profile Reviewed yes  -CT     Limitations/Impairments safety/cognitive  -CT     Row Name 22 1250          Cognition    Affect/Mental Status (Cognition) WFL  -CT     Row Name 22 1250           Transfers    Transfers sit-stand transfer;stand-sit transfer  -CT     Row Name 12/07/22 1250          Sit-Stand Transfer    Sit-Stand Randolph (Transfers) moderate assist (50% patient effort)  -CT     Assistive Device (Sit-Stand Transfers) walker, front-wheeled  -CT     Row Name 12/07/22 1250          Stand-Sit Transfer    Stand-Sit Randolph (Transfers) moderate assist (50% patient effort);verbal cues  verbal cueing to control sitting  -CT     Assistive Device (Stand-Sit Transfers) walker, front-wheeled  -CT     Row Name 12/07/22 1250          Gait/Stairs (Locomotion)    Gait/Stairs Locomotion gait/ambulation assistive device  -CT     Randolph Level (Gait) minimum assist (75% patient effort);moderate assist (50% patient effort);2 person assist;verbal cues;nonverbal cues (demo/gesture)  pt requires more assist with each attempt  -CT     Assistive Device (Gait) walker, front-wheeled  -CT     Distance in Feet (Gait) 6, 15, 3  -CT     Pattern (Gait) step-to  -CT     Deviations/Abnormal Patterns (Gait) stride length decreased;gait speed decreased;festinating/shuffling;weight shifting decreased  -CT     Bilateral Gait Deviations weight shift ability decreased;forward flexed posture;knee buckling bilaterally  -CT     Row Name 12/07/22 1250          Motor Skills    Therapeutic Exercise --  BLE seated ther ex: LAQ, AP, seated marches, glut sets, hip abd/add 25-30 reps each  -CT     Row Name 12/07/22 1250          Coping    Observed Emotional State pleasant;cooperative  -CT     Verbalized Emotional State acceptance  -CT     Family/Support Persons family  -CT     Row Name 12/07/22 1250          Plan of Care Review    Plan of Care Reviewed With patient  -CT     Row Name 12/07/22 1250          Positioning and Restraints    Pre-Treatment Position sitting in chair/recliner  -CT     Post Treatment Position chair  -CT     In Chair sitting;call light within reach;encouraged to call for assist;notified nsg  -CT            User Key  (r) = Recorded By, (t) = Taken By, (c) = Cosigned By    Initials Name Provider Type    Bhargavi Woody PT Physical Therapist                Physical Therapy Education     Title: PT OT SLP Therapies (Done)     Topic: Physical Therapy (Done)     Point: Mobility training (Done)     Learning Progress Summary           Patient Acceptance, E,TB, VU by BC at 11/27/2022 1319    Acceptance, E,TB, VU by RM at 11/20/2022 1449    Acceptance, E,TB, VU by RM at 11/18/2022 1021    Acceptance, E,TB, VU by RH at 11/16/2022 1110    Acceptance, E,TB, VU by KK at 11/14/2022 1638    Acceptance, E,TB, VU by KK at 11/13/2022 1648    Acceptance, E,TB, VU by KG at 11/10/2022 1827    Acceptance, E,TB, VU by DM at 11/9/2022 1638    Acceptance, E,TB, VU by KK at 11/8/2022 1754    Acceptance, E,TB, VU by KK at 11/7/2022 1635    Acceptance, E,D, VU,NR by LL at 11/5/2022 1325    Acceptance, E, VU by EB at 11/4/2022 1448    Acceptance, E,TB, VU by KM at 11/4/2022 1101                   Point: Home exercise program (Done)     Learning Progress Summary           Patient Acceptance, E,TB, VU by BC at 11/27/2022 1319    Acceptance, E,TB, VU by RM at 11/20/2022 1449    Acceptance, E,TB, VU by RM at 11/18/2022 1021    Acceptance, E,TB, VU by RH at 11/16/2022 1110    Acceptance, E,TB, VU by KK at 11/14/2022 1638    Acceptance, E,TB, VU by KK at 11/13/2022 1648    Acceptance, E,TB, VU by KG at 11/10/2022 1827    Acceptance, E,TB, VU by DM at 11/9/2022 1638    Acceptance, E,TB, VU by KK at 11/8/2022 1754    Acceptance, E,TB, VU by KK at 11/7/2022 1635    Acceptance, E,D, VU,NR by LL at 11/5/2022 1325    Acceptance, E, VU by EB at 11/4/2022 1448    Acceptance, E,TB, VU by KM at 11/4/2022 1101                   Point: Body mechanics (Done)     Learning Progress Summary           Patient Acceptance, E,TB, VU by BC at 11/27/2022 1319    Acceptance, E,TB, VU by RM at 11/20/2022 1449    Acceptance, E,TB, VU by RM at 11/18/2022 1021    Acceptance,  E,TB, VU by RH at 11/16/2022 1110    Acceptance, E,TB, VU by KK at 11/14/2022 1638    Acceptance, E,TB, VU by KK at 11/13/2022 1648    Acceptance, E,TB, VU by KG at 11/10/2022 1827    Acceptance, E,TB, VU by DM at 11/9/2022 1638    Acceptance, E,TB, VU by KK at 11/8/2022 1754    Acceptance, E,TB, VU by KK at 11/7/2022 1635    Acceptance, E,D, VU,NR by LL at 11/5/2022 1325    Acceptance, E, VU by EB at 11/4/2022 1448    Acceptance, E,TB, VU by KM at 11/4/2022 1101                   Point: Precautions (Done)     Learning Progress Summary           Patient Acceptance, E,TB, VU by BC at 11/27/2022 1319    Acceptance, E,TB, VU by RM at 11/20/2022 1449    Acceptance, E,TB, VU by RM at 11/18/2022 1021    Acceptance, E,TB, VU by RH at 11/16/2022 1110    Acceptance, E,TB, VU by KK at 11/14/2022 1638    Acceptance, E,TB, VU by KK at 11/13/2022 1648    Acceptance, E,TB, VU by KG at 11/10/2022 1827    Acceptance, E,TB, VU by DM at 11/9/2022 1638    Acceptance, E,TB, VU by KK at 11/8/2022 1754    Acceptance, E,TB, VU by KK at 11/7/2022 1635    Acceptance, E,D, VU,NR by LL at 11/5/2022 1325    Acceptance, E, VU by EB at 11/4/2022 1448    Acceptance, E,TB, VU by KM at 11/4/2022 1101                               User Key     Initials Effective Dates Name Provider Type Discipline    LL 05/02/16 -  Neda Garcia PTA Physical Therapist Assistant PT    RM 06/14/22 -  Bethany Krause RN Registered Nurse Nurse    DM 06/16/21 -  Moniz, Deborah, RN Registered Nurse Nurse    BC 08/02/22 -  Collett, Lyn, RN Registered Nurse Nurse    KM 05/24/22 -  Mao Vázquez, PT Physical Therapist PT    EB 09/22/22 -  Cedric Oliver, RN Registered Nurse Nurse    KK 06/27/22 -  Chelsi Bolivar, RN Registered Nurse Nurse    KG 09/22/22 -  Verónica Rodriguez, RN Registered Nurse Nurse     08/03/22 -  Jung Russell RN [facility] Registered Nurse Nurse              PT Recommendation and Plan  Anticipated Discharge Disposition (PT): home with 24/7 care,  skilled nursing facility  Planned Therapy Interventions (PT): balance training, bed mobility training, gait training, home exercise program, manual therapy techniques, motor coordination training, neuromuscular re-education, patient/family education, postural re-education, strengthening, transfer training  Therapy Frequency (PT): 5 times/wk (5-6 times/wk)  Progress Summary (PT)  Progress Toward Functional Goals (PT): progress toward functional goals is gradual  Plan of Care Reviewed With: patient  Progress: improving   Outcome Measures     Row Name 12/07/22 1506 12/05/22 1440          How much help from another is currently needed...    Putting on and taking off regular lower body clothing? 2  -KP 2  -KR     Bathing (including washing, rinsing, and drying) 2  -KP 2  -KR     Toileting (which includes using toilet bed pan or urinal) 3  -KP 3  -KR     Putting on and taking off regular upper body clothing 3  -KP 3  -KR     Taking care of personal grooming (such as brushing teeth) 4  -KP 4  -KR     Eating meals 4  -KP 4  -KR     AM-PAC 6 Clicks Score (OT) 18  -KP 18  -KR           User Key  (r) = Recorded By, (t) = Taken By, (c) = Cosigned By    Initials Name Provider Type    KR Andres Uriarte OT Occupational Therapist    KP Linn Quevedo, OT Occupational Therapist                 Time Calculation:    PT Charges     Row Name 12/07/22 1519             Time Calculation    Start Time 1250  -CT      Stop Time 1340  -CT      Time Calculation (min) 50 min  -CT      PT Received On 12/07/22  -CT            User Key  (r) = Recorded By, (t) = Taken By, (c) = Cosigned By    Initials Name Provider Type    CT Bhargavi Vásquez PT Physical Therapist              Therapy Charges for Today     Code Description Service Date Service Provider Modifiers Qty    01800458653 HC GAIT TRAINING EA 15 MIN 12/6/2022 Bhargavi Vásquez, PT GP 1    13439006778 HC PT THER PROC EA 15 MIN 12/6/2022 Bhargavi Vásquez, PT GP 1    47815692098 HC GAIT TRAINING  EA 15 MIN 2022 Bhargavi Vásquez, PT GP 1    47451156429 HC PT THER PROC EA 15 MIN 2022 Bhargavi Vásquez PT GP 1          PT G-Codes  Outcome Measure Options: AM-PAC 6 Clicks Daily Activity (OT)  AM-PAC 6 Clicks Score (PT): 12  AM-PAC 6 Clicks Score (OT): 18    Bhargavi Vásquez PT  2022      Electronically signed by Bhargavi Vásquez PT at 22 1520     Bhargavi Vásquez PT at 22 1411  Version 1 of 1            22 1410   OTHER   Discipline physical therapist   Rehab Time/Intention   Session Not Performed other (see comments)  (Pt on hold per nursing staff while awaiting doppler of RLE)       Electronically signed by Bhargavi Vásquez PT at 22 1411          Occupational Therapy Notes (last 48 hours)      Andres Uriarte, OT at 22 1422          Acute Care - Occupational Therapy Treatment Note  [facility]     Patient Name: Tre Kinney  : 1932  MRN: [id number]  Today's Date: 2022  Onset of Illness/Injury or Date of Surgery: 10/06/22     Referring Physician: Janneth    Admit Date: 10/5/2022     No diagnosis found.  Patient Active Problem List   Diagnosis   • Acid reflux   • Ankle arthralgia   • Cardiac conduction disorder   • Closed fracture of distal fibula   • Arteriosclerosis of coronary artery   • Fracture of distal end of tibia   • Elevated cholesterol   • BP (high blood pressure)   • Hypertrophic polyarthritis   • OP (osteoporosis)   • Right heart failure (HCC)   • Heart failure, chronic, right-sided (HCC)   • SOB (shortness of breath)   • Palpitations   • CHF (congestive heart failure) (HCC)   • Congestive heart failure (HCC)   • Precordial pain   • Chronic anticoagulation   • Hypokalemia   • Anemia, unspecified type   • Generalized weakness     Past Medical History:   Diagnosis Date   • Anemia    • Arthritis    • CHF (congestive heart failure) (HCC)    • Chronic kidney disease    • Coronary artery disease    • H/O blood clots    • History of transfusion     no  reaction    • Hypertension      Past Surgical History:   Procedure Laterality Date   • ABDOMINAL SURGERY     • APPENDECTOMY     • CARDIAC SURGERY     •  SECTION     • COLONOSCOPY N/A 3/7/2018    Procedure: COLONOSCOPY;  Surgeon: Willie Gresham MD;  Location:  [facility];  Service:    • CORONARY ANGIOPLASTY WITH STENT PLACEMENT     • ENDOSCOPY N/A 3/6/2018    Procedure: ESOPHAGOGASTRODUODENOSCOPY;  Surgeon: Willie Gresham MD;  Location:  [facility];  Service:    • PACEMAKER IMPLANTATION      left side    • SKIN BIOPSY     • TUBAL ABDOMINAL LIGATION           OT ASSESSMENT FLOWSHEET (last 12 hours)     OT Evaluation and Treatment     Row Name 22 1416                   OT Time and Intention    Document Type therapy note (daily note)  -KR        Mode of Treatment occupational therapy  -KR        Patient Effort adequate  -KR        Comment Pt on hold for mobility training/LE ther ex. Nursing agreed for UE activity to be performed as tolerated. OT saw pt for UE ther ex from supported seated in bed. Pt tolerated activity adequately at this time. SonWillian contacted via [facility] and informed of current limitations due to pending tests LE. OT to continue as tolerated. Pt continues alert/cooperative and motivated to improve fxl status.  -KR           Shoulder (Therapeutic Exercise)    Shoulder AROM (Therapeutic Exercise) bilateral;flexion;extension;10 repetitions;3 sets  -KR        Shoulder Strengthening (Therapeutic Exercise) bilateral;flexion;extension;aBduction;aDduction;resistance band;yellow;5 repetitions;2 sets  -KR           Elbow/Forearm (Therapeutic Exercise)    Elbow/Forearm (Therapeutic Exercise) AROM (active range of motion)  -KR        Elbow/Forearm AROM (Therapeutic Exercise) bilateral;flexion;extension  -KR        Elbow/Forearm Strengthening (Therapeutic Exercise) bilateral;flexion;extension;resistance band;yellow;5 repetitions;2 sets  -KR           Hand (Therapeutic Exercise)    Hand  Strengthening (Therapeutic Exercise) bilateral; strengthening;resistance band;yellow  -KR           Plan of Care Review    Plan of Care Reviewed With patient;son  -KR              User Key  (r) = Recorded By, (t) = Taken By, (c) = Cosigned By    Initials Name Effective Dates    Andres Ayala OT 06/16/21 -                        OT Recommendation and Plan     Progress Toward Functional Goals (OT): progress toward functional goals is gradual  Plan of Care Review  Plan of Care Reviewed With: patient, son  Progress: improving  Plan of Care Reviewed With: patient, son     Outcome Measures     Row Name 12/08/22 1421 12/07/22 1506 12/05/22 1440       How much help from another is currently needed...    Putting on and taking off regular lower body clothing? 2  -KR 2  -KP 2  -KR    Bathing (including washing, rinsing, and drying) 2  -KR 2  -KP 2  -KR    Toileting (which includes using toilet bed pan or urinal) 2  -KR 3  -KP 3  -KR    Putting on and taking off regular upper body clothing 3  -KR 3  -KP 3  -KR    Taking care of personal grooming (such as brushing teeth) 4  -KR 4  -KP 4  -KR    Eating meals 4  -KR 4  -KP 4  -KR    AM-PAC 6 Clicks Score (OT) 17  -KR 18  -KP 18  -KR          User Key  (r) = Recorded By, (t) = Taken By, (c) = Cosigned By    Initials Name Provider Type    Andres Ayala OT Occupational Therapist    Linn Richardson OT Occupational Therapist                Time Calculation:    Time Calculation- OT     Row Name 12/08/22 1421             Time Calculation- OT    Total Timed Code Minutes- OT 30 minute(s)  -KR            User Key  (r) = Recorded By, (t) = Taken By, (c) = Cosigned By    Initials Name Provider Type    Andres Ayala OT Occupational Therapist              Therapy Charges for Today     Code Description Service Date Service Provider Modifiers Qty    29066076400  OT THERAPEUTIC ACT EA 15 MIN 12/8/2022 Andres Uriarte OT GO 2               Andres Uriarte  OT  2022    Electronically signed by Andres Uriarte, OT at 22 1423     Linn Quevedo, OT at 22 3727          Acute Care - Occupational Therapy Treatment Note  [facility]     Patient Name: Tre Kinney  : 1932  MRN: [id number]  Today's Date: 2022  Onset of Illness/Injury or Date of Surgery: 10/06/22     Referring Physician: Janneth    Admit Date: 10/5/2022     No diagnosis found.  Patient Active Problem List   Diagnosis   • Acid reflux   • Ankle arthralgia   • Cardiac conduction disorder   • Closed fracture of distal fibula   • Arteriosclerosis of coronary artery   • Fracture of distal end of tibia   • Elevated cholesterol   • BP (high blood pressure)   • Hypertrophic polyarthritis   • OP (osteoporosis)   • Right heart failure (HCC)   • Heart failure, chronic, right-sided (HCC)   • SOB (shortness of breath)   • Palpitations   • CHF (congestive heart failure) (HCC)   • Congestive heart failure (HCC)   • Precordial pain   • Chronic anticoagulation   • Hypokalemia   • Anemia, unspecified type   • Generalized weakness     Past Medical History:   Diagnosis Date   • Anemia    • Arthritis    • CHF (congestive heart failure) (HCC)    • Chronic kidney disease    • Coronary artery disease    • H/O blood clots    • History of transfusion     no reaction    • Hypertension      Past Surgical History:   Procedure Laterality Date   • ABDOMINAL SURGERY     • APPENDECTOMY     • CARDIAC SURGERY     •  SECTION     • COLONOSCOPY N/A 3/7/2018    Procedure: COLONOSCOPY;  Surgeon: Willie Gresham MD;  Location:  [facility];  Service:    • CORONARY ANGIOPLASTY WITH STENT PLACEMENT     • ENDOSCOPY N/A 3/6/2018    Procedure: ESOPHAGOGASTRODUODENOSCOPY;  Surgeon: Willie Gresham MD;  Location:  [facility];  Service:    • PACEMAKER IMPLANTATION      left side    • SKIN BIOPSY     • TUBAL ABDOMINAL LIGATION           OT ASSESSMENT FLOWSHEET (last 12 hours)     OT Evaluation and Treatment     Row  Name 12/07/22 1508                   OT Time and Intention    Subjective Information no complaints  -KP        Document Type therapy note (daily note)  -        Mode of Treatment individual therapy;occupational therapy  -KP        Total Minutes, Occupational Therapy 50  -KP        Patient Effort good  -KP           General Information    Patient Profile Reviewed yes  -KP        General Observations of Patient Patient agreeable to therapy. She was seen from 1250 to 1330 on this date. PT is room for safety with functional mobility. Son on Facetime throughout session.  -        Existing Precautions/Restrictions fall  -KP        Limitations/Impairments safety/cognitive  -        Equipment Issued to Patient gait belt  -           Pain Assessment    Pretreatment Pain Rating 0/10 - no pain  -KP        Posttreatment Pain Rating 0/10 - no pain  -KP           Cognition    Affect/Mental Status (Cognition) WFL  -        Orientation Status (Cognition) oriented x 4  -        Follows Commands (Cognition) WFL  -        Personal Safety Interventions gait belt;nonskid shoes/slippers when out of bed  -           Sit-Stand Transfer    Sit-Stand Murray (Transfers) moderate assist (50% patient effort)  -        Assistive Device (Sit-Stand Transfers) walker, front-wheeled  -KP           Stand-Sit Transfer    Stand-Sit Murray (Transfers) moderate assist (50% patient effort);verbal cues  -        Assistive Device (Stand-Sit Transfers) walker, front-wheeled  -KP           Motor Skills    Functional Endurance fair  -        Therapeutic Exercise other (see comments)  BUE exercise while seated through functional movement patterns with yellow theraband X20 reps X2 sets (AROM for shoulder flexion)  -           Positioning and Restraints    Pre-Treatment Position sitting in chair/recliner  -KP        Post Treatment Position chair  -KP        In Chair call light within reach  -KP              User Key  (r) =  Recorded By, (t) = Taken By, (c) = Cosigned By    Initials Name Effective Dates    Linn Richardson, VELASQUEZ 06/16/21 -                        OT Recommendation and Plan     Progress Toward Functional Goals (OT): progress toward functional goals is gradual  Plan of Care Review  Plan of Care Reviewed With: patient  Progress: no change  Outcome Evaluation: Patient seen for OT treatment session (9:06 to 9:45). She declined calling son due to patient seen for self care tasks. She performed ADLs from recliner as documented. She is able to perform upper body tasks with set-up, but requires maximal assist with ADLs requiring standing. Continue plan of care.  Plan of Care Reviewed With: patient  Outcome Evaluation: Patient seen for OT treatment session (9:06 to 9:45). She declined calling son due to patient seen for self care tasks. She performed ADLs from recliner as documented. She is able to perform upper body tasks with set-up, but requires maximal assist with ADLs requiring standing. Continue plan of care.     Outcome Measures     Row Name 12/07/22 1506 12/05/22 1440          How much help from another is currently needed...    Putting on and taking off regular lower body clothing? 2  -KP 2  -KR     Bathing (including washing, rinsing, and drying) 2  -KP 2  -KR     Toileting (which includes using toilet bed pan or urinal) 3  -KP 3  -KR     Putting on and taking off regular upper body clothing 3  -KP 3  -KR     Taking care of personal grooming (such as brushing teeth) 4  -KP 4  -KR     Eating meals 4  -KP 4  -KR     AM-PAC 6 Clicks Score (OT) 18  -KP 18  -KR           User Key  (r) = Recorded By, (t) = Taken By, (c) = Cosigned By    Initials Name Provider Type    Andres Ayala OT Occupational Therapist    Linn Richardson, OT Occupational Therapist                Time Calculation:    Time Calculation- OT     Row Name 12/07/22 1506             Time Calculation- OT    OT Start Time 1250  -KP      OT Stop Time 1330  -KP       OT Time Calculation (min) 40 min  -KP      Total Timed Code Minutes- OT 40 minute(s)  -KP      OT Received On 12/07/22  -KP         Timed Charges    55734 - OT Therapeutic Exercise Minutes 18  -KP         Total Minutes    Timed Charges Total Minutes 18  -KP       Total Minutes 18  -KP            User Key  (r) = Recorded By, (t) = Taken By, (c) = Cosigned By    Initials Name Provider Type     Linn Quevedo OT Occupational Therapist              Therapy Charges for Today     Code Description Service Date Service Provider Modifiers Qty    74901900383 HC OT THER PROC EA 15 MIN 12/6/2022 Linn Quevedo OT GO 1    66729699093 HC OT THER PROC EA 15 MIN 12/7/2022 Linn Quevedo OT GO 1               Linn Quevedo OT  12/7/2022    Electronically signed by Linn Quevedo OT at 12/07/22 1507       ADL Documentation (most recent)    Flowsheet Row Most Recent Value   Transferring 3 - assistive equipment and person   Toileting 3 - assistive equipment and person   Bathing 2 - assistive person   Dressing 2 - assistive person   Eating 0 - independent   Communication 0 - understands/communicates without difficulty   Swallowing 0 - swallows foods/liquids without difficulty   Equipment Currently Used at Home wheelchair, commode, walker, rolling, oxygen                                                                                                  Discharge Planning Assessment   [place]     Patient Name: Tre Kinney                    MRN: [id number]  Today's Date: 12/8/2022                     Admit Date: 10/5/2022             Discharge Plan            Row Name 12/08/22 1331             Plan      Plan Pt was admitted to swing bed on 10/5/22. Pt has been approved for additional swing bed days. Swing bed RN is not available today. SS will discuss approval of additional swing bed days with the swing bed RN tomorrow. SS to follow.

## 2022-12-09 NOTE — THERAPY TREATMENT NOTE
Acute Care - Physical Therapy Treatment Note   [facility]     Patient Name: Tre Kinney  : 1932  MRN: [id number]  Today's Date: 2022   Onset of Illness/Injury or Date of Surgery: 10/06/22  Visit Dx:   No diagnosis found.  Patient Active Problem List   Diagnosis   • Acid reflux   • Ankle arthralgia   • Cardiac conduction disorder   • Closed fracture of distal fibula   • Arteriosclerosis of coronary artery   • Fracture of distal end of tibia   • Elevated cholesterol   • BP (high blood pressure)   • Hypertrophic polyarthritis   • OP (osteoporosis)   • Right heart failure (HCC)   • Heart failure, chronic, right-sided (HCC)   • SOB (shortness of breath)   • Palpitations   • CHF (congestive heart failure) (HCC)   • Congestive heart failure (HCC)   • Precordial pain   • Chronic anticoagulation   • Hypokalemia   • Anemia, unspecified type   • Generalized weakness     Past Medical History:   Diagnosis Date   • Anemia    • Arthritis    • CHF (congestive heart failure) (HCC)    • Chronic kidney disease    • Coronary artery disease    • H/O blood clots    • History of transfusion     no reaction    • Hypertension      Past Surgical History:   Procedure Laterality Date   • ABDOMINAL SURGERY     • APPENDECTOMY     • CARDIAC SURGERY     •  SECTION     • COLONOSCOPY N/A 3/7/2018    Procedure: COLONOSCOPY;  Surgeon: Willie Gresham MD;  Location:  [facility];  Service:    • CORONARY ANGIOPLASTY WITH STENT PLACEMENT     • ENDOSCOPY N/A 3/6/2018    Procedure: ESOPHAGOGASTRODUODENOSCOPY;  Surgeon: Willie Gresham MD;  Location:  [facility];  Service:    • PACEMAKER IMPLANTATION      left side    • SKIN BIOPSY     • TUBAL ABDOMINAL LIGATION       PT Assessment (last 12 hours)     PT Evaluation and Treatment     Row Name 22 1315          Physical Therapy Time and Intention    Subjective Information complains of;pain  knee pain  -CT     Document Type therapy note (daily note)  -CT     Mode of Treatment  physical therapy  -CT     Patient Effort good  -CT     Comment Pts treatment session [id number]. Ambulated x 1 with increased pain in knees reported. Pt then performed seated ther ex sitting in chair.  -CT     Row Name 12/09/22 1315          General Information    Patient Profile Reviewed yes  -CT     Limitations/Impairments safety/cognitive  -CT     Row Name 12/09/22 1315          Cognition    Affect/Mental Status (Cognition) WFL  -CT     Row Name 12/09/22 1315          Transfers    Transfers sit-stand transfer;stand-sit transfer  -CT     Row Name 12/09/22 1315          Sit-Stand Transfer    Sit-Stand Vance (Transfers) moderate assist (50% patient effort);2 person assist  -CT     Assistive Device (Sit-Stand Transfers) walker, front-wheeled  -CT     Row Name 12/09/22 1315          Stand-Sit Transfer    Stand-Sit Vance (Transfers) moderate assist (50% patient effort);verbal cues  verbal cueing to control sitting  -CT     Assistive Device (Stand-Sit Transfers) walker, front-wheeled  -CT     Row Name 12/09/22 1315          Gait/Stairs (Locomotion)    Gait/Stairs Locomotion gait/ambulation assistive device  -CT     Vance Level (Gait) moderate assist (50% patient effort);2 person assist;verbal cues;nonverbal cues (demo/gesture)  -CT     Assistive Device (Gait) walker, front-wheeled  -CT     Distance in Feet (Gait) 5  -CT     Pattern (Gait) step-to  -CT     Deviations/Abnormal Patterns (Gait) stride length decreased;gait speed decreased;festinating/shuffling;weight shifting decreased  -CT     Bilateral Gait Deviations weight shift ability decreased;forward flexed posture;knee buckling bilaterally  -CT     Row Name 12/09/22 1315          Motor Skills    Therapeutic Exercise --  BLE seated ther ex: AP, LAQ, seated marches, glut sets, hip abd/add 25-30 reps  -CT     Row Name 12/09/22 1315          Coping    Observed Emotional State pleasant;cooperative  -CT     Verbalized Emotional State acceptance  -CT      Family/Support Persons family  -CT     Row Name 12/09/22 1315          Positioning and Restraints    Pre-Treatment Position sitting in chair/recliner  -CT     Post Treatment Position chair  -CT     In Chair sitting;call light within reach;encouraged to call for assist;notified nsg  -CT           User Key  (r) = Recorded By, (t) = Taken By, (c) = Cosigned By    Initials Name Provider Type    CT Bhargavi Vásquez PT Physical Therapist                Physical Therapy Education     Title: PT OT SLP Therapies (Done)     Topic: Physical Therapy (Done)     Point: Mobility training (Done)     Learning Progress Summary           Patient Acceptance, E,TB, VU by RW at 12/9/2022 1451    Acceptance, E,TB, VU by BC at 11/27/2022 1319    Acceptance, E,TB, VU by RM at 11/20/2022 1449    Acceptance, E,TB, VU by RM at 11/18/2022 1021    Acceptance, E,TB, VU by RH at 11/16/2022 1110    Acceptance, E,TB, VU by KK at 11/14/2022 1638    Acceptance, E,TB, VU by KK at 11/13/2022 1648    Acceptance, E,TB, VU by KG at 11/10/2022 1827    Acceptance, E,TB, VU by DM at 11/9/2022 1638    Acceptance, E,TB, VU by KK at 11/8/2022 1754    Acceptance, E,TB, VU by KK at 11/7/2022 1635    Acceptance, E,D, VU,NR by LL at 11/5/2022 1325    Acceptance, E, VU by EB at 11/4/2022 1448    Acceptance, E,TB, VU by KM at 11/4/2022 1101                   Point: Home exercise program (Done)     Learning Progress Summary           Patient Acceptance, E,TB, VU by RW at 12/9/2022 1451    Acceptance, E,TB, VU by BC at 11/27/2022 1319    Acceptance, E,TB, VU by RM at 11/20/2022 1449    Acceptance, E,TB, VU by RM at 11/18/2022 1021    Acceptance, E,TB, VU by RH at 11/16/2022 1110    Acceptance, E,TB, VU by KK at 11/14/2022 1638    Acceptance, E,TB, VU by KK at 11/13/2022 1648    Acceptance, E,TB, VU by KG at 11/10/2022 1827    Acceptance, E,TB, VU by DM at 11/9/2022 1638    Acceptance, E,TB, VU by KK at 11/8/2022 1754    Acceptance, E,TB, VU by KK at 11/7/2022 1635     Acceptance, E,D, VU,NR by LL at 11/5/2022 1325    Acceptance, E, VU by EB at 11/4/2022 1448    Acceptance, E,TB, VU by KM at 11/4/2022 1101                   Point: Body mechanics (Done)     Learning Progress Summary           Patient Acceptance, E,TB, VU by RW at 12/9/2022 1451    Acceptance, E,TB, VU by BC at 11/27/2022 1319    Acceptance, E,TB, VU by RM at 11/20/2022 1449    Acceptance, E,TB, VU by RM at 11/18/2022 1021    Acceptance, E,TB, VU by RH at 11/16/2022 1110    Acceptance, E,TB, VU by KK at 11/14/2022 1638    Acceptance, E,TB, VU by KK at 11/13/2022 1648    Acceptance, E,TB, VU by KG at 11/10/2022 1827    Acceptance, E,TB, VU by DM at 11/9/2022 1638    Acceptance, E,TB, VU by KK at 11/8/2022 1754    Acceptance, E,TB, VU by KK at 11/7/2022 1635    Acceptance, E,D, VU,NR by LL at 11/5/2022 1325    Acceptance, E, VU by EB at 11/4/2022 1448    Acceptance, E,TB, VU by KM at 11/4/2022 1101                   Point: Precautions (Done)     Learning Progress Summary           Patient Acceptance, E,TB, VU by RW at 12/9/2022 1451    Acceptance, E,TB, VU by BC at 11/27/2022 1319    Acceptance, E,TB, VU by RM at 11/20/2022 1449    Acceptance, E,TB, VU by RM at 11/18/2022 1021    Acceptance, E,TB, VU by RH at 11/16/2022 1110    Acceptance, E,TB, VU by KK at 11/14/2022 1638    Acceptance, E,TB, VU by KK at 11/13/2022 1648    Acceptance, E,TB, VU by KG at 11/10/2022 1827    Acceptance, E,TB, VU by DM at 11/9/2022 1638    Acceptance, E,TB, VU by KK at 11/8/2022 1754    Acceptance, E,TB, VU by KK at 11/7/2022 1635    Acceptance, E,D, VU,NR by LL at 11/5/2022 1325    Acceptance, E, VU by EB at 11/4/2022 1448    Acceptance, E,TB, VU by KM at 11/4/2022 1101                               User Key     Initials Effective Dates Name Provider Type Discipline     05/02/16 -  Neda Garcia PTA Physical Therapist Assistant PT    RM 06/14/22 -  Bethany Krause, RN Registered Nurse Nurse     06/16/21 -  Moniz, Deborah, SONAM  Registered Nurse Nurse    BC 08/02/22 -  Collett, Brittany, RN Registered Nurse Nurse    KM 05/24/22 -  Mao Vázquez, PT Physical Therapist PT    EB 09/22/22 -  Cedric Oliver, RN Registered Nurse Nurse    KK 06/27/22 -  Chelsi Bolivar, RN Registered Nurse Nurse    KG 09/22/22 -  Verónica Rodriguez, RN Registered Nurse Nurse    RW 09/22/22 -  Bethany Schofield, RN Registered Nurse Nurse    RH 08/03/22 -  Jung Russell RN Extern Registered Nurse Nurse              PT Recommendation and Plan  Anticipated Discharge Disposition (PT): home with 24/7 [facility], [facility] nursing [facility]  Planned Therapy Interventions (PT): balance training, bed mobility training, gait training, home exercise program, manual therapy techniques, motor coordination training, neuromuscular re-education, patient/family education, postural re-education, strengthening, transfer training  Therapy Frequency (PT): 5 times/wk (5-6 times/wk)  Progress Summary (PT)  Progress Toward Functional Goals (PT): progress toward functional goals is gradual  Plan of Care Reviewed With: patient  Progress: improving   Outcome Measures     Row Name 12/08/22 1421 12/07/22 1506          How much help from another is currently needed...    Putting on and taking off regular lower body clothing? 2  -KR 2  -KP     Bathing (including washing, rinsing, and drying) 2  -KR 2  -KP     Toileting (which includes using toilet bed pan or urinal) 2  -KR 3  -KP     Putting on and taking off regular upper body clothing 3  -KR 3  -KP     Taking care of personal grooming (such as brushing teeth) 4  -KR 4  -KP     Eating meals 4  -KR 4  -KP     AM-PAC 6 Clicks Score (OT) 17  -KR 18  -KP           User Key  (r) = Recorded By, (t) = Taken By, (c) = Cosigned By    Initials Name Provider Type    Andres Ayala OT Occupational Therapist    Linn Richardson OT Occupational Therapist                 Time Calculation:    PT Charges     Row Name 12/09/22 7437             Time Calculation     Start Time 1310  -CT      Stop Time 1350  -CT      Time Calculation (min) 40 min  -CT      PT Received On 12/09/22  -CT            User Key  (r) = Recorded By, (t) = Taken By, (c) = Cosigned By    Initials Name Provider Type    CT Bhargavi Vásquez, PT Physical Therapist              Therapy Charges for Today     Code Description Service Date Service Provider Modifiers Qty    90447454693 HC GAIT TRAINING EA 15 MIN 12/9/2022 Bhargavi Vásquez, PT GP 1    17881312269  PT THER PROC EA 15 MIN 12/9/2022 Bhargavi Vásquez, PT GP 1    [id number]  PT THERAPEUTIC ACT EA 15 MIN 12/9/2022 Bhargavi Vásquez, PT GP 1          PT G-Codes  Outcome Measure Options: AM-PAC 6 Clicks Daily Activity (OT)  AM-PAC 6 Clicks Score (PT): 12  AM-PAC 6 Clicks Score (OT): 17    Bhargavi Vásquez PT  12/9/2022

## 2022-12-09 NOTE — PROGRESS NOTES
[facility] HOSPITALIST PROGRESS NOTE    Subjective     History:   Tre Kinney is a 89 y.o. female admitted on 10/5/2022 secondary to <principal problem not specified>     Procedures: None    CC: Follow up debility    Reason for follow up swing bed visit today: Follow up right leg pain    Patient seen and examined with SONAM Sims. Awake and alert. Sitting in bedside chair with her son present at bedside. Reports some right right knee and leg pain but appears improved from yesterday. No reported CP or dyspnea. No reported nausea or vomiting. No acute events overnight per RN.     History taken from: patient, chart, and RN.      Objective     Vital Signs  Temp:  [97.3 °F (36.3 °C)-97.9 °F (36.6 °C)] 97.3 °F (36.3 °C)  Heart Rate:  [80-91] 90  Resp:  [18] 18  BP: (114-134)/(50-66) 115/66    Intake/Output Summary (Last 24 hours) at 12/9/2022 1355  Last data filed at 12/9/2022 1200  Gross per 24 hour   Intake 1560 ml   Output 850 ml   Net 710 ml         Physical Exam: Unchanged from previous.   General:    Awake, alert, in no acute distress, chronically ill appearing   Heart:      Normal S1 and S2. Regular rate and rhythm. No significant murmur, rubs or gallops appreciated.   Lungs:     Respirations regular, even and unlabored. Lungs clear to auscultation B/L. No wheezes, rales or rhonchi.   Abdomen:   Soft and nontender. No guarding, rebound tenderness or  organomegaly noted. Bowel sounds present x 4.   Extremities:  Trace bilateral pedal edema with mild edema RLE. Moves UE and LE equally B/L with generalized weakness.     Results Review:    Results from last 7 days   Lab Units 12/06/22  0220   WBC 10*3/mm3 6.80   HEMOGLOBIN g/dL 9.5*   PLATELETS 10*3/mm3 200     Results from last 7 days   Lab Units 12/06/22  0220   SODIUM mmol/L 139   POTASSIUM mmol/L 3.8   CHLORIDE mmol/L 102   CO2 mmol/L 26.1   BUN mg/dL 33*   CREATININE mg/dL 1.92*   CALCIUM mg/dL 8.8   GLUCOSE mg/dL 104*                        Imaging Results (Last 24 Hours)     Procedure Component Value Units Date/Time    US Venous Doppler Lower Extremity Bilateral (duplex) [[id number]] Collected: 12/08/22 1511     Updated: 12/08/22 1515    Narrative:      EXAM:    US Duplex Bilateral Lower Extremities Veins     EXAM DATE:    12/8/2022 2:23 PM     CLINICAL HISTORY:    Pain, swelling     TECHNIQUE:    Real-time duplex ultrasound scan of the bilateral lower extremity  veins integrating B-mode two-dimensional vascular structure, Doppler  spectral analysis, color flow Doppler imaging and compression.     COMPARISON:    No relevant prior studies available.     FINDINGS:    RIGHT DEEP VEINS:  Unremarkable.  No DVT in the right common femoral,  femoral, proximal deep femoral or popliteal veins.  The veins  demonstrate normal color flow, are normally compressible, with normal  phasic flow and/or augmentation response.       LEFT DEEP VEINS:  No compression of the left superficial femoral vein  mid to distal positive for deep venous thrombosis.       SOFT TISSUES:  No acute findings.  No popliteal cyst.       Impression:        No compression of the left superficial femoral vein mid to distal  positive for deep venous thrombosis.     This report was finalized on 12/8/2022 3:12 PM by Dr. Ion Del Rosario MD.               Medications:  apixaban, 5 mg, Oral, Q12H  Diclofenac Sodium, 4 g, Topical, 4x Daily  escitalopram, 10 mg, Oral, Daily  furosemide, 40 mg, Oral, BID  gabapentin, 300 mg, Oral, Nightly  hydrALAZINE, 5 mg, Oral, Q8H  melatonin, 10 mg, Oral, Nightly  metoprolol tartrate, 25 mg, Oral, Q12H  multivitamin, 1 tablet, Oral, Daily  pantoprazole, 40 mg, Oral, Q AM  sodium chloride, 10 mL, Intravenous, Q12H  sodium chloride, 10 mL, Intravenous, Q12H  tamsulosin, 0.4 mg, Oral, Daily      Pharmacy Consult,             Assessment & Plan   Generalized weakness/debility  Diffuse LLE DVT  Right leg pain  DIANA on CKD IV  Acute blood loss anemia  Traumatic right  gluteal hematoma  Mild hypokalemia   Paroxysmal Afib  Hx of SSS s/p pacemaker placement  Essential HTN  Reported hx of DVT's   Anxiety/depression  Advanced age    Plan  Venous duplex US with no evidence of DVT in RLE. X-rays reveal degenerative changes consistent with OA. Discussed results of imaging with patient and her son at bedside. Pain appears improved today. Cont current medication regimen including Voltaren gel. BP stable and controlled today on current regimen. Tolerating Eliquis with no reported signs of bleeding. Discussed disposition with swing bed coordinator who states appeal was approved but clinical updates required which have been submitted today. Cont PT/OT as tolerated.     Disposition: Clinical updates submitted by swing bed coordinator today for additional swing bed days.             Mustapha Loredo,   12/09/22  13:55 EST

## 2022-12-09 NOTE — CASE MANAGEMENT/SOCIAL WORK
Discharge Planning Assessment  [facility]     Patient Name: Tre Kinney  MRN: [id number]  Today's Date: 12/9/2022    Admit Date: 10/5/2022     Discharge Plan     Row Name 12/09/22 0951       Plan    Plan Pt was admitted to swing bed on 10/5/22. SS discussed pt with swing bed RN on this date and he states appeal was approved, however clinical updates had to be submitted today requesting additional swing bed days. Swing bed RN has submitted updated clinicals. SS to follow.              Continued Care and Services - Admitted Since 10/5/2022     Destination     Service Provider Request Status Selected Services Address Phone Fax Patient Preferred    [facility] Accepted N/A [address] [place] [address] [phone] --    [facility] Pending - Request Sent N/A [address] [place] [address] [phone] --    [facility] Declined  Does not have a secondary payor source N/A [address] [facility] [place] [address] [phone] --            Selected Continued Care - Prior Encounters Includes continued care and service providers with selected services from prior encounters from 7/7/2022 to 12/9/2022    Discharged on 10/5/2022 Admission date: 9/19/2022 - Discharge disposition: Swing Bed    Destination     Service Provider Selected Services Address Phone Fax Patient Preferred    [facility] SWING [facility] [address] [facility] [place] [address] [phone] -- --                    LENORE Koch

## 2022-12-09 NOTE — PLAN OF CARE
Goal Outcome Evaluation:  Patient resting in chair at this time. Pt worked with physical therapy this shift and tolerated well. VSS. Will continue with plan of care.

## 2022-12-09 NOTE — PLAN OF CARE
Goal Outcome Evaluation:               Pt is progressing medically, no signs or symptoms of distress, continue with current plan of care.

## 2022-12-10 PROCEDURE — 97110 THERAPEUTIC EXERCISES: CPT

## 2022-12-10 PROCEDURE — 97116 GAIT TRAINING THERAPY: CPT

## 2022-12-10 PROCEDURE — 97530 THERAPEUTIC ACTIVITIES: CPT

## 2022-12-10 RX ADMIN — DICLOFENAC 4 G: 10 GEL TOPICAL at 20:39

## 2022-12-10 RX ADMIN — APIXABAN 5 MG: 5 TABLET, FILM COATED ORAL at 08:20

## 2022-12-10 RX ADMIN — HYDRALAZINE HYDROCHLORIDE 5 MG: 25 TABLET, FILM COATED ORAL at 21:08

## 2022-12-10 RX ADMIN — Medication 10 ML: at 20:38

## 2022-12-10 RX ADMIN — DICLOFENAC 4 G: 10 GEL TOPICAL at 14:29

## 2022-12-10 RX ADMIN — FUROSEMIDE 40 MG: 40 TABLET ORAL at 08:20

## 2022-12-10 RX ADMIN — APIXABAN 5 MG: 5 TABLET, FILM COATED ORAL at 20:38

## 2022-12-10 RX ADMIN — Medication 1 TABLET: at 08:20

## 2022-12-10 RX ADMIN — HYDRALAZINE HYDROCHLORIDE 5 MG: 25 TABLET, FILM COATED ORAL at 05:51

## 2022-12-10 RX ADMIN — FUROSEMIDE 40 MG: 40 TABLET ORAL at 18:16

## 2022-12-10 RX ADMIN — GABAPENTIN 300 MG: 300 CAPSULE ORAL at 20:38

## 2022-12-10 RX ADMIN — DICLOFENAC 4 G: 10 GEL TOPICAL at 08:19

## 2022-12-10 RX ADMIN — METOPROLOL TARTRATE 25 MG: 25 TABLET, FILM COATED ORAL at 08:19

## 2022-12-10 RX ADMIN — HYDRALAZINE HYDROCHLORIDE 5 MG: 25 TABLET, FILM COATED ORAL at 14:36

## 2022-12-10 RX ADMIN — PANTOPRAZOLE SODIUM 40 MG: 40 TABLET, DELAYED RELEASE ORAL at 05:51

## 2022-12-10 RX ADMIN — ESCITALOPRAM 10 MG: 10 TABLET, FILM COATED ORAL at 08:20

## 2022-12-10 RX ADMIN — Medication 10 MG: at 20:38

## 2022-12-10 RX ADMIN — METOPROLOL TARTRATE 25 MG: 25 TABLET, FILM COATED ORAL at 20:38

## 2022-12-10 RX ADMIN — DICLOFENAC 4 G: 10 GEL TOPICAL at 18:15

## 2022-12-10 RX ADMIN — TAMSULOSIN HYDROCHLORIDE 0.4 MG: 0.4 CAPSULE ORAL at 08:20

## 2022-12-10 RX ADMIN — Medication 10 ML: at 08:21

## 2022-12-10 NOTE — THERAPY TREATMENT NOTE
Acute Care - Occupational Therapy Treatment Note   [facility]     Patient Name: Tre Kinney  : 1932  MRN: [id number]  Today's Date: 12/10/2022  Onset of Illness/Injury or Date of Surgery: 10/06/22     Referring Physician: Janneth    Admit Date: 10/5/2022     No diagnosis found.  Patient Active Problem List   Diagnosis   • Acid reflux   • Ankle arthralgia   • Cardiac conduction disorder   • Closed fracture of distal fibula   • Arteriosclerosis of coronary artery   • Fracture of distal end of tibia   • Elevated cholesterol   • BP (high blood pressure)   • Hypertrophic polyarthritis   • OP (osteoporosis)   • Right heart failure (HCC)   • Heart failure, chronic, right-sided (HCC)   • SOB (shortness of breath)   • Palpitations   • CHF (congestive heart failure) (HCC)   • Congestive heart failure (HCC)   • Precordial pain   • Chronic anticoagulation   • Hypokalemia   • Anemia, unspecified type   • Generalized weakness     Past Medical History:   Diagnosis Date   • Anemia    • Arthritis    • CHF (congestive heart failure) ([facility])    • Chronic kidney disease    • Coronary artery disease    • H/O blood clots    • History of transfusion     no reaction    • Hypertension      Past Surgical History:   Procedure Laterality Date   • ABDOMINAL SURGERY     • APPENDECTOMY     • CARDIAC SURGERY     •  SECTION     • COLONOSCOPY N/A 3/7/2018    Procedure: COLONOSCOPY;  Surgeon: Willie Gresham MD;  Location:  [facility];  Service:    • CORONARY ANGIOPLASTY WITH STENT PLACEMENT     • ENDOSCOPY N/A 3/6/2018    Procedure: ESOPHAGOGASTRODUODENOSCOPY;  Surgeon: Willie Gresham MD;  Location:  [facility];  Service:    • PACEMAKER IMPLANTATION      left side    • SKIN BIOPSY     • TUBAL ABDOMINAL LIGATION           OT ASSESSMENT FLOWSHEET (last 12 hours)     OT Evaluation and Treatment     Row Name 12/10/22 1231                   OT Time and Intention    Subjective Information no complaints  -HB        Document Type  therapy note (daily note)  -HB        Mode of Treatment individual therapy;occupational therapy  -HB        Total Minutes, Occupational Therapy 24  -HB        Patient Effort good  -HB        Symptoms Noted During/After Treatment none  -HB           General Information    Patient Profile Reviewed yes  -HB        Patient/Family/Caregiver Comments/Observations Patient agreeable to OT tx this date.  -HB        General Observations of Patient Patient tolerated OT well with no adverse reactions.  -HB           Pain Assessment    Pretreatment Pain Rating 0/10 - no pain  -HB        Posttreatment Pain Rating 0/10 - no pain  -HB           Cognition    Affect/Mental Status (Cognition) WFL  -HB        Follows Commands (Cognition) WFL  -HB           Grooming Assessment/Training    Cuming Level (Grooming) grooming skills;wash face, hands;set up  -HB        Position (Grooming) supported sitting  -HB           Self-Feeding Assessment/Training    Cuming Level (Feeding) feeding skills;set up  -HB        Position (Self-Feeding) supported sitting  -HB           Motor Skills    Motor Skills functional endurance  -HB        Motor Control/Coordination Interventions therapeutic exercise/ROM;gross motor coordination activities  -HB        Therapeutic Exercise shoulder;elbow/forearm;hand;wrist  -HB        Additional Documentation --  flex bar 1 set of 10 reps; wrist rolls; rest between tasks.; pt educated on HEP while in room with good understanding verbalzied.  -HB           Positioning and Restraints    Pre-Treatment Position sitting in chair/recliner  -HB        Post Treatment Position chair  -HB        In Bed encouraged to call for assist;call light within reach  -HB              User Key  (r) = Recorded By, (t) = Taken By, (c) = Cosigned By    Initials Name Effective Dates    HB Katelynn Fernández, OT 05/25/21 -                        OT Recommendation and Plan           Outcome Measures     Row Name 12/10/22 1200 12/09/22 6440  12/08/22 1421       How much help from another is currently needed...    Putting on and taking off regular lower body clothing? 2  -HB 2  -KR 2  -KR    Bathing (including washing, rinsing, and drying) 2  -HB 2  -KR 2  -KR    Toileting (which includes using toilet bed pan or urinal) 3  -HB 3  -KR 2  -KR    Putting on and taking off regular upper body clothing 3  -HB 3  -KR 3  -KR    Taking care of personal grooming (such as brushing teeth) 3  -HB 3  -KR 4  -KR    Eating meals 4  -HB 4  -KR 4  -KR    AM-PAC 6 Clicks Score (OT) 17  -HB 17  -KR 17  -KR    Row Name 12/07/22 1506             How much help from another is currently needed...    Putting on and taking off regular lower body clothing? 2  -KP      Bathing (including washing, rinsing, and drying) 2  -KP      Toileting (which includes using toilet bed pan or urinal) 3  -KP      Putting on and taking off regular upper body clothing 3  -KP      Taking care of personal grooming (such as brushing teeth) 4  -KP      Eating meals 4  -KP      AM-PAC 6 Clicks Score (OT) 18  -KP            User Key  (r) = Recorded By, (t) = Taken By, (c) = Cosigned By    Initials Name Provider Type    Andres Ayala OT Occupational Therapist    KP Linn Quevedo OT Occupational Therapist    HB Katelynn Fernández OT Occupational Therapist                Time Calculation:    Time Calculation- OT     Row Name 12/10/22 1232             Time Calculation- OT    OT Start Time 1156  -HB      OT Stop Time 1220  -HB      OT Time Calculation (min) 24 min  -HB      Total Timed Code Minutes- OT 24 minute(s)  -HB            User Key  (r) = Recorded By, (t) = Taken By, (c) = Cosigned By    Initials Name Provider Type    HB Katelynn Fernández OT Occupational Therapist              Therapy Charges for Today     Code Description Service Date Service Provider Modifiers Qty    [id number]  OT THERAPEUTIC ACT EA 15 MIN 12/10/2022 Katelynn Fernández OT GO 2               Katelynn Fernández OT  12/10/2022

## 2022-12-10 NOTE — THERAPY TREATMENT NOTE
Acute Care - Physical Therapy Treatment Note   Scot     Patient Name: Tre Kinney  : 1932  MRN: [id number]  Today's Date: 12/10/2022   Onset of Illness/Injury or Date of Surgery: 10/06/22  Visit Dx:   No diagnosis found.  Patient Active Problem List   Diagnosis   • Acid reflux   • Ankle arthralgia   • Cardiac conduction disorder   • Closed fracture of distal fibula   • Arteriosclerosis of coronary artery   • Fracture of distal end of tibia   • Elevated cholesterol   • BP (high blood pressure)   • Hypertrophic polyarthritis   • OP (osteoporosis)   • Right heart failure (HCC)   • Heart failure, chronic, right-sided (HCC)   • SOB (shortness of breath)   • Palpitations   • CHF (congestive heart failure) (HCC)   • Congestive heart failure (HCC)   • Precordial pain   • Chronic anticoagulation   • Hypokalemia   • Anemia, unspecified type   • Generalized weakness     Past Medical History:   Diagnosis Date   • Anemia    • Arthritis    • CHF (congestive heart failure) (HCC)    • Chronic kidney disease    • Coronary artery disease    • H/O blood clots    • History of transfusion     no reaction    • Hypertension      Past Surgical History:   Procedure Laterality Date   • ABDOMINAL SURGERY     • APPENDECTOMY     • CARDIAC SURGERY     •  SECTION     • COLONOSCOPY N/A 3/7/2018    Procedure: COLONOSCOPY;  Surgeon: Willie Gresham MD;  Location:  [facility];  Service:    • CORONARY ANGIOPLASTY WITH STENT PLACEMENT     • ENDOSCOPY N/A 3/6/2018    Procedure: ESOPHAGOGASTRODUODENOSCOPY;  Surgeon: Willie Gresham MD;  Location:  [facility];  Service:    • PACEMAKER IMPLANTATION      left side    • SKIN BIOPSY     • TUBAL ABDOMINAL LIGATION       PT Assessment (last 12 hours)     PT Evaluation and Treatment     Row Name 12/10/22 0855          Physical Therapy Time and Intention    Subjective Information complains of;weakness  -CT     Document Type therapy note (daily note)  -CT     Mode of Treatment  physical therapy  -CT     Patient Effort good  -CT     Comment Pt treatment session [id number]. Transfers pt out of bed and into chair. Ambulation x 2 and then pt peformed seated ther ex.  -CT     Row Name 12/10/22 0855          General Information    Patient Profile Reviewed yes  -CT     Limitations/Impairments safety/cognitive  -CT     Row Name 12/10/22 0855          Cognition    Affect/Mental Status (Cognition) WFL  -CT     Row Name 12/10/22 0855          Transfers    Transfers sit-stand transfer;stand-sit transfer  -CT     Row Name 12/10/22 0855          Sit-Stand Transfer    Sit-Stand [place] (Transfers) moderate assist (50% patient effort);2 person assist  -CT     Assistive Device (Sit-Stand Transfers) walker, front-wheeled  -CT     Row Name 12/10/22 0855          Stand-Sit Transfer    Stand-Sit [place] (Transfers) moderate assist (50% patient effort);verbal cues  continue to provide verbal cueing for proper technique when sitting  -CT     Assistive Device (Stand-Sit Transfers) walker, front-wheeled  -CT     Row Name 12/10/22 0855          Gait/Stairs (Locomotion)    Gait/Stairs Locomotion gait/ambulation assistive device  -CT     [place] Level (Gait) moderate assist (50% patient effort);2 person assist;verbal cues;nonverbal cues (demo/gesture)  -CT     Assistive Device (Gait) walker, front-wheeled  -CT     Distance in Feet (Gait) 6, 3  -CT     Pattern (Gait) step-to  -CT     Deviations/Abnormal Patterns (Gait) stride length decreased;gait speed decreased;festinating/shuffling;weight shifting decreased  -CT     Bilateral Gait Deviations weight shift ability decreased;forward flexed posture;knee buckling bilaterally  -CT     Row Name 12/10/22 0855          Motor Skills    Therapeutic Exercise --  BLE seated ther ex: AP, LAQ, seated marches, glut sets, hip abd/add  -CT     Row Name 12/10/22 0855          Coping    Observed Emotional State pleasant;cooperative  -CT     Verbalized Emotional State  acceptance  -CT     Family/Support Persons family  -CT     Row Name 12/10/22 0855          Plan of Care Review    Plan of Care Reviewed With patient  -CT     Row Name 12/10/22 0855          Positioning and Restraints    Pre-Treatment Position in bed  -CT     Post Treatment Position chair  -CT     In Chair sitting;call light within reach;encouraged to call for assist;notified nsg  -CT           User Key  (r) = Recorded By, (t) = Taken By, (c) = Cosigned By    Initials Name Provider Type    CT Bhargavi Vásquez PT Physical Therapist                Physical Therapy Education     Title: PT OT SLP Therapies (Done)     Topic: Physical Therapy (Done)     Point: Mobility training (Done)     Learning Progress Summary           Patient Acceptance, E,TB, VU by RW at 12/9/2022 1451    Acceptance, E,TB, VU by BC at 11/27/2022 1319    Acceptance, E,TB, VU by RM at 11/20/2022 1449    Acceptance, E,TB, VU by RM at 11/18/2022 1021    Acceptance, E,TB, VU by RH at 11/16/2022 1110    Acceptance, E,TB, VU by KK at 11/14/2022 1638    Acceptance, E,TB, VU by KK at 11/13/2022 1648    Acceptance, E,TB, VU by KG at 11/10/2022 1827    Acceptance, E,TB, VU by DM at 11/9/2022 1638    Acceptance, E,TB, VU by KK at 11/8/2022 1754    Acceptance, E,TB, VU by KK at 11/7/2022 1635    Acceptance, E,D, VU,NR by LL at 11/5/2022 1325    Acceptance, E, VU by EB at 11/4/2022 1448    Acceptance, E,TB, VU by KM at 11/4/2022 1101                   Point: Home exercise program (Done)     Learning Progress Summary           Patient Acceptance, E,TB, VU by RW at 12/9/2022 1451    Acceptance, E,TB, VU by BC at 11/27/2022 1319    Acceptance, E,TB, VU by RM at 11/20/2022 1449    Acceptance, E,TB, VU by RM at 11/18/2022 1021    Acceptance, E,TB, VU by RH at 11/16/2022 1110    Acceptance, E,TB, VU by KK at 11/14/2022 1638    Acceptance, E,TB, VU by KK at 11/13/2022 1648    Acceptance, E,TB, VU by KG at 11/10/2022 1827    Acceptance, E,TB, VU by DM at 11/9/2022 1638     Acceptance, E,TB, VU by KK at 11/8/2022 1754    Acceptance, E,TB, VU by KK at 11/7/2022 1635    Acceptance, E,D, VU,NR by LL at 11/5/2022 1325    Acceptance, E, VU by EB at 11/4/2022 1448    Acceptance, E,TB, VU by KM at 11/4/2022 1101                   Point: Body mechanics (Done)     Learning Progress Summary           Patient Acceptance, E,TB, VU by RW at 12/9/2022 1451    Acceptance, E,TB, VU by BC at 11/27/2022 1319    Acceptance, E,TB, VU by RM at 11/20/2022 1449    Acceptance, E,TB, VU by RM at 11/18/2022 1021    Acceptance, E,TB, VU by RH at 11/16/2022 1110    Acceptance, E,TB, VU by KK at 11/14/2022 1638    Acceptance, E,TB, VU by KK at 11/13/2022 1648    Acceptance, E,TB, VU by KG at 11/10/2022 1827    Acceptance, E,TB, VU by DM at 11/9/2022 1638    Acceptance, E,TB, VU by KK at 11/8/2022 1754    Acceptance, E,TB, VU by KK at 11/7/2022 1635    Acceptance, E,D, VU,NR by LL at 11/5/2022 1325    Acceptance, E, VU by EB at 11/4/2022 1448    Acceptance, E,TB, VU by KM at 11/4/2022 1101                   Point: Precautions (Done)     Learning Progress Summary           Patient Acceptance, E,TB, VU by RW at 12/9/2022 1451    Acceptance, E,TB, VU by BC at 11/27/2022 1319    Acceptance, E,TB, VU by RM at 11/20/2022 1449    Acceptance, E,TB, VU by RM at 11/18/2022 1021    Acceptance, E,TB, VU by RH at 11/16/2022 1110    Acceptance, E,TB, VU by KK at 11/14/2022 1638    Acceptance, E,TB, VU by KK at 11/13/2022 1648    Acceptance, E,TB, VU by KG at 11/10/2022 1827    Acceptance, E,TB, VU by DM at 11/9/2022 1638    Acceptance, E,TB, VU by KK at 11/8/2022 1754    Acceptance, E,TB, VU by KK at 11/7/2022 1635    Acceptance, E,D, VU,NR by LL at 11/5/2022 1325    Acceptance, E, VU by EB at 11/4/2022 1448    Acceptance, E,TB, VU by KM at 11/4/2022 1101                               User Key     Initials Effective Dates Name Provider Type Discipline     05/02/16 -  Neda Garcia PTA Physical Therapist Assistant PT     RM 06/14/22 -  Bethany Krause RN Registered Nurse Nurse    DM 06/16/21 -  Moniz, Deborah, RN Registered Nurse Nurse    BC 08/02/22 -  Collett, Brittany, RN Registered Nurse Nurse    KM 05/24/22 -  Mao Vázquez, PT Physical Therapist PT    EB 09/22/22 -  Cedric Oliver, RN Registered Nurse Nurse    KK 06/27/22 -  Chelsi Bolivar, RN Registered Nurse Nurse    KG 09/22/22 -  Verónica Rodriguez, RN Registered Nurse Nurse    RW 09/22/22 -  Bethany Schofield, RN Registered Nurse Nurse    RH 08/03/22 -  Jung Russell RN Extern Registered Nurse Nurse              PT Recommendation and Plan  Anticipated Discharge Disposition (PT): home with 24/7 [facility], [facility] nursing [facility]  Planned Therapy Interventions (PT): balance training, bed mobility training, gait training, home exercise program, manual therapy techniques, motor coordination training, neuromuscular re-education, patient/family education, postural re-education, strengthening, transfer training  Therapy Frequency (PT): 5 times/wk (5-6 times/wk)  Progress Summary (PT)  Progress Toward Functional Goals (PT): progress toward functional goals is gradual  Plan of Care Reviewed With: patient  Progress: improving   Outcome Measures     Row Name 12/09/22 1637 12/08/22 1421 12/07/22 1506       How much help from another is currently needed...    Putting on and taking off regular lower body clothing? 2  -KR 2  -KR 2  -KP    Bathing (including washing, rinsing, and drying) 2  -KR 2  -KR 2  -KP    Toileting (which includes using toilet bed pan or urinal) 3  -KR 2  -KR 3  -KP    Putting on and taking off regular upper body clothing 3  -KR 3  -KR 3  -KP    Taking care of personal grooming (such as brushing teeth) 3  -KR 4  -KR 4  -KP    Eating meals 4  -KR 4  -KR 4  -KP    AM-PAC 6 Clicks Score (OT) 17  -KR 17  -KR 18  -KP          User Key  (r) = Recorded By, (t) = Taken By, (c) = Cosigned By    Initials Name Provider Type    Andres Ayala, OT Occupational Therapist    KP  Linn Quevedo, OT Occupational Therapist                 Time Calculation:    PT Charges     Row Name 12/10/22 0936             Time Calculation    Start Time 0855  -CT      Stop Time 0925  -CT      Time Calculation (min) 30 min  -CT      PT Received On 12/10/22  -CT            User Key  (r) = Recorded By, (t) = Taken By, (c) = Cosigned By    Initials Name Provider Type    CT Bhargavi Vásquez, EDEL Physical Therapist              Therapy Charges for Today     Code Description Service Date Service Provider Modifiers Qty    52839901364 HC GAIT TRAINING EA 15 MIN 12/9/2022 Bhargavi Vásquez, PT GP 1    33534739937 HC PT THER PROC EA 15 MIN 12/9/2022 Bhargavi Vásquez, PT GP 1    52391993965 HC PT THERAPEUTIC ACT EA 15 MIN 12/9/2022 Bhargavi Vásquez, PT GP 1    22192665260 HC GAIT TRAINING EA 15 MIN 12/10/2022 Bhargavi Vásquez, PT GP 1    66607986956 HC PT THER PROC EA 15 MIN 12/10/2022 Bhargavi Vásquez, PT GP 1          PT G-Codes  Outcome Measure Options: AM-PAC 6 Clicks Daily Activity (OT)  AM-PAC 6 Clicks Score (PT): 12  AM-PAC 6 Clicks Score (OT): 17    Bhargavi Vásquez PT  12/10/2022

## 2022-12-10 NOTE — PROGRESS NOTES
Pt admitted to swing bed on 10/5 for additional therapy in the setting of generalized weakness/debility. Pt not seen or examined today. I have reviewed her VS. I have discussed her care with SONAM Tejeda who does not report any acute events or issues that need to be addressed. RN states pt has reported improvement in her leg pain today. BP stable today. Cont current antihypertensive regimen. Cont Eliquis for treatment of DVT. Cont PT/OT. Discharge appeal approved and swing bed coordinator submitted required clinical updates on 12/9 with decision pending.       Mustapha Loredo,   12/10/22  15:52 EDT

## 2022-12-11 RX ADMIN — METOPROLOL TARTRATE 25 MG: 25 TABLET, FILM COATED ORAL at 08:01

## 2022-12-11 RX ADMIN — HYDRALAZINE HYDROCHLORIDE 5 MG: 25 TABLET, FILM COATED ORAL at 21:09

## 2022-12-11 RX ADMIN — DICLOFENAC 4 G: 10 GEL TOPICAL at 13:20

## 2022-12-11 RX ADMIN — Medication 10 ML: at 08:01

## 2022-12-11 RX ADMIN — Medication 10 ML: at 20:38

## 2022-12-11 RX ADMIN — APIXABAN 5 MG: 5 TABLET, FILM COATED ORAL at 20:39

## 2022-12-11 RX ADMIN — TAMSULOSIN HYDROCHLORIDE 0.4 MG: 0.4 CAPSULE ORAL at 08:01

## 2022-12-11 RX ADMIN — APIXABAN 5 MG: 5 TABLET, FILM COATED ORAL at 08:01

## 2022-12-11 RX ADMIN — ESCITALOPRAM 10 MG: 10 TABLET, FILM COATED ORAL at 08:01

## 2022-12-11 RX ADMIN — METOPROLOL TARTRATE 25 MG: 25 TABLET, FILM COATED ORAL at 20:39

## 2022-12-11 RX ADMIN — Medication 1 TABLET: at 08:01

## 2022-12-11 RX ADMIN — DICLOFENAC 4 G: 10 GEL TOPICAL at 07:59

## 2022-12-11 RX ADMIN — DICLOFENAC 4 G: 10 GEL TOPICAL at 20:39

## 2022-12-11 RX ADMIN — HYDRALAZINE HYDROCHLORIDE 5 MG: 25 TABLET, FILM COATED ORAL at 05:48

## 2022-12-11 RX ADMIN — Medication 10 MG: at 20:38

## 2022-12-11 RX ADMIN — FUROSEMIDE 40 MG: 40 TABLET ORAL at 17:06

## 2022-12-11 RX ADMIN — FUROSEMIDE 40 MG: 40 TABLET ORAL at 08:01

## 2022-12-11 RX ADMIN — GABAPENTIN 300 MG: 300 CAPSULE ORAL at 20:38

## 2022-12-11 RX ADMIN — PANTOPRAZOLE SODIUM 40 MG: 40 TABLET, DELAYED RELEASE ORAL at 05:48

## 2022-12-11 RX ADMIN — HYDRALAZINE HYDROCHLORIDE 5 MG: 25 TABLET, FILM COATED ORAL at 13:20

## 2022-12-11 RX ADMIN — DICLOFENAC 4 G: 10 GEL TOPICAL at 17:06

## 2022-12-11 NOTE — PLAN OF CARE
Goal Outcome Evaluation:           Progress: no change  Outcome Evaluation: Patient has been able to transfer to the chair for lunch and is up for supper, patient has voiced no complaints or concerns throughout the shift.

## 2022-12-11 NOTE — PLAN OF CARE
Goal Outcome Evaluation:  Has sat up in chair most of day.  Worked with physical therapy this am.  Has had some visitors today and watched the [employer] game this afternoon.  No complains of pain.  Continue with care plan.

## 2022-12-11 NOTE — PROGRESS NOTES
Pt admitted to swing bed on 10/5 for additional therapy in the setting of generalized weakness/debility. Pt not seen or examined today. I have reviewed her VS. I have discussed her care with SONAM Chase who does not report any acute events or issues that need to be addressed. BP stable today. Cont current antihypertensive regimen. Cont Eliquis for treatment of DVT. Cont PT/OT. Discharge appeal approved and swing bed coordinator submitted required clinical updates on 12/9 with decision pending. Cont supportive treatment.       Mustapha Loredo,   12/11/22  15:52 EDT

## 2022-12-11 NOTE — PLAN OF CARE
Goal Outcome Evaluation:   Improving medically.  Still c/o BLE weakness which she says is preventing her from returning home at this time.  Resting comfortably.

## 2022-12-12 PROCEDURE — 97116 GAIT TRAINING THERAPY: CPT

## 2022-12-12 PROCEDURE — 97110 THERAPEUTIC EXERCISES: CPT

## 2022-12-12 RX ADMIN — DICLOFENAC 4 G: 10 GEL TOPICAL at 17:15

## 2022-12-12 RX ADMIN — APIXABAN 5 MG: 5 TABLET, FILM COATED ORAL at 08:01

## 2022-12-12 RX ADMIN — HYDRALAZINE HYDROCHLORIDE 5 MG: 25 TABLET, FILM COATED ORAL at 05:14

## 2022-12-12 RX ADMIN — TAMSULOSIN HYDROCHLORIDE 0.4 MG: 0.4 CAPSULE ORAL at 08:01

## 2022-12-12 RX ADMIN — Medication 10 ML: at 08:04

## 2022-12-12 RX ADMIN — PANTOPRAZOLE SODIUM 40 MG: 40 TABLET, DELAYED RELEASE ORAL at 05:14

## 2022-12-12 RX ADMIN — HYDRALAZINE HYDROCHLORIDE 5 MG: 25 TABLET, FILM COATED ORAL at 14:24

## 2022-12-12 RX ADMIN — GABAPENTIN 300 MG: 300 CAPSULE ORAL at 22:23

## 2022-12-12 RX ADMIN — ESCITALOPRAM 10 MG: 10 TABLET, FILM COATED ORAL at 08:01

## 2022-12-12 RX ADMIN — METOPROLOL TARTRATE 25 MG: 25 TABLET, FILM COATED ORAL at 08:01

## 2022-12-12 RX ADMIN — DICLOFENAC 4 G: 10 GEL TOPICAL at 22:24

## 2022-12-12 RX ADMIN — FUROSEMIDE 40 MG: 40 TABLET ORAL at 17:15

## 2022-12-12 RX ADMIN — Medication 10 MG: at 22:23

## 2022-12-12 RX ADMIN — Medication 1 TABLET: at 08:01

## 2022-12-12 RX ADMIN — HYDRALAZINE HYDROCHLORIDE 5 MG: 25 TABLET, FILM COATED ORAL at 22:23

## 2022-12-12 RX ADMIN — METOPROLOL TARTRATE 25 MG: 25 TABLET, FILM COATED ORAL at 22:24

## 2022-12-12 RX ADMIN — FUROSEMIDE 40 MG: 40 TABLET ORAL at 08:01

## 2022-12-12 RX ADMIN — DICLOFENAC 4 G: 10 GEL TOPICAL at 11:26

## 2022-12-12 RX ADMIN — APIXABAN 5 MG: 5 TABLET, FILM COATED ORAL at 22:22

## 2022-12-12 RX ADMIN — DICLOFENAC 4 G: 10 GEL TOPICAL at 08:01

## 2022-12-12 NOTE — PROGRESS NOTES
[facility] HOSPITALISTS CROSS COVER NOTE    Patient Identification:  Name:  Tre Kinney  Age:  89 y.o.  Sex:  female  :  1932  MRN:  [id number]  Visit number:  [id number]  Primary Care Provider:  Jessica Pedro APRN    Length of stay in inpatient status:  68    Brief Update     I saw the patient today.  The patient was sitting in the recliner at bedside.  She states that her feet are little more swollen today than they had been but overall she thinks that her swelling has improved.  She denies chest pain, trouble breathing, coughing, nausea, vomiting, and diarrhea.  The patient had expressed to bedside nurse Rena earlier in the day that she was unsure if she would be able to stay at home for fear of not being able to do some activities of daily living.  Social workers Kelly and Erin as well as the  Ileana Mckinley all went and saw the patient today.  They will continue to work with the patient and the family about final disposition.  Please note that insurance has approved swing bed until  2022, with projected discharge today being 12/15/2022.  Please note that our swing bed coordinating nurse Michelet provided the family with the paperwork to file an appeal.    Please note that the patient had 2 episodes of encopresis today.  I reviewed her orders and her medicine administration record; the patient has not received any medication for constipation.  I will order a KUB to look for any constipation as this could explain the encopresis if she had liquid stool seeping past the area of stool.  If no stool is seen on the KUB, then we have to consider a slow reaction time to get to the bedside commode on time or an issue with sphincter tone.    Jannet Lagunas MD  [facility] Hospital[facility]  22  18:00 EST

## 2022-12-12 NOTE — THERAPY TREATMENT NOTE
Acute Care - Occupational Therapy Treatment Note   Scot     Patient Name: Tre Kinney  : 1932  MRN: [id number]  Today's Date: 2022  Onset of Illness/Injury or Date of Surgery: 10/06/22     Referring Physician: Janneth    Admit Date: 10/5/2022     No diagnosis found.  Patient Active Problem List   Diagnosis   • Acid reflux   • Ankle arthralgia   • Cardiac conduction disorder   • Closed fracture of distal fibula   • Arteriosclerosis of coronary artery   • Fracture of distal end of tibia   • Elevated cholesterol   • BP (high blood pressure)   • Hypertrophic polyarthritis   • OP (osteoporosis)   • Right heart failure (HCC)   • Heart failure, chronic, right-sided (HCC)   • SOB (shortness of breath)   • Palpitations   • CHF (congestive heart failure) (HCC)   • Congestive heart failure (HCC)   • Precordial pain   • Chronic anticoagulation   • Hypokalemia   • Anemia, unspecified type   • Generalized weakness     Past Medical History:   Diagnosis Date   • Anemia    • Arthritis    • CHF (congestive heart failure) ([facility])    • Chronic kidney disease    • Coronary artery disease    • H/O blood clots    • History of transfusion     no reaction    • Hypertension      Past Surgical History:   Procedure Laterality Date   • ABDOMINAL SURGERY     • APPENDECTOMY     • CARDIAC SURGERY     •  SECTION     • COLONOSCOPY N/A 3/7/2018    Procedure: COLONOSCOPY;  Surgeon: Willie Gresham MD;  Location:  [facility];  Service:    • CORONARY ANGIOPLASTY WITH STENT PLACEMENT     • ENDOSCOPY N/A 3/6/2018    Procedure: ESOPHAGOGASTRODUODENOSCOPY;  Surgeon: Willie Gresham MD;  Location:  [facility];  Service:    • PACEMAKER IMPLANTATION      left side    • SKIN BIOPSY     • TUBAL ABDOMINAL LIGATION           OT ASSESSMENT FLOWSHEET (last 12 hours)     OT Evaluation and Treatment     Row Name 22 1442                   OT Time and Intention    Subjective Information no complaints  -KP        Document Type  therapy note (daily note)  -KP        Mode of Treatment individual therapy;occupational therapy  -KP        Total Minutes, Occupational Therapy 46  -KP        Patient Effort good  -KP        Comment Patient seen for OT treatment from 1250 to 1336 with PT for safety during functional mobility. Lenny Strickland throughout.  -KP           General Information    Patient Profile Reviewed yes  -KP        Existing Precautions/Restrictions fall  -KP           Pain Assessment    Pretreatment Pain Rating 0/10 - no pain  -KP        Posttreatment Pain Rating 0/10 - no pain  -KP           Cognition    Affect/Mental Status (Cognition) WFL  -KP        Orientation Status (Cognition) oriented x 4  -KP        Follows Commands (Cognition) WFL  -KP        Personal Safety Interventions gait belt;nonskid shoes/slippers when out of bed  -KP           Functional Mobility    Functional Mobility- Comment Patient completed trials X3 wtih rolling walker. She was able to perform to doorway in hospital room on this date from recliner.  -           Sit-Stand Transfer    Sit-Stand [place] (Transfers) moderate assist (50% patient effort);1 person assist  -        Assistive Device (Sit-Stand Transfers) walker, front-wheeled  -           Stand-Sit Transfer    Stand-Sit [place] (Transfers) moderate assist (50% patient effort);verbal cues  -        Assistive Device (Stand-Sit Transfers) walker, front-wheeled  -           Motor Skills    Functional Endurance fair plus  -KP        Therapeutic Exercise other (see comments)  BUE exercise with red resistance theraband through functional movement patterns from sitting up in bed X20 reps X2 sets  -KP           Positioning and Restraints    Pre-Treatment Position sitting in chair/recliner  -KP        Post Treatment Position chair  -KP        In Chair sitting;call light within reach;encouraged to call for assist  -KP              User Key  (r) = Recorded By, (t) = Taken By, (c) = Cosigned By     Initials Name Effective Dates    Linn Richardson, VELASQUEZ 06/16/21 -                        OT Recommendation and Plan     Progress Toward Functional Goals (OT): progress toward functional goals is gradual  Plan of Care Review  Plan of Care Reviewed With: patient  Progress: no change  Outcome Evaluation: Patient seen for OT treatment session (9:06 to 9:45). She declined calling son due to patient seen for self care tasks. She performed ADLs from recliner as documented. She is able to perform upper body tasks with set-up, but requires maximal assist with ADLs requiring standing. Continue plan of care.  Plan of Care Reviewed With: patient  Outcome Evaluation: Patient seen for OT treatment session (9:06 to 9:45). She declined calling son due to patient seen for self care tasks. She performed ADLs from recliner as documented. She is able to perform upper body tasks with set-up, but requires maximal assist with ADLs requiring standing. Continue plan of care.     Outcome Measures     Row Name 12/12/22 1448 12/10/22 1200 12/09/22 1637       How much help from another is currently needed...    Putting on and taking off regular lower body clothing? 2  -KP 2  -HB 2  -KR    Bathing (including washing, rinsing, and drying) 2  -KP 2  -HB 2  -KR    Toileting (which includes using toilet bed pan or urinal) 3  -KP 3  -HB 3  -KR    Putting on and taking off regular upper body clothing 3  -KP 3  -HB 3  -KR    Taking care of personal grooming (such as brushing teeth) 3  -KP 3  -HB 3  -KR    Eating meals 4  -KP 4  -HB 4  -KR    AM-PAC 6 Clicks Score (OT) 17  -KP 17  -HB 17  -KR          User Key  (r) = Recorded By, (t) = Taken By, (c) = Cosigned By    Initials Name Provider Type    Andres Ayala OT Occupational Therapist    Linn Richardson, OT Occupational Therapist    Katelynn Gan OT Occupational Therapist                Time Calculation:    Time Calculation- OT     Row Name 12/12/22 1449             Time Calculation- OT     OT Start Time 1250  -      OT Stop Time 1336  -      OT Time Calculation (min) 46 min  -      Total Timed Code Minutes- OT 46 minute(s)  -      OT Received On 12/12/22  -         Timed Charges    47689 - OT Therapeutic Exercise Minutes 18  -KP         Total Minutes    Timed Charges Total Minutes 18  -KP       Total Minutes 18  -KP            User Key  (r) = Recorded By, (t) = Taken By, (c) = Cosigned By    Initials Name Provider Type     Linn Quevedo OT Occupational Therapist              Therapy Charges for Today     Code Description Service Date Service Provider Modifiers Qty    17992253426  OT THER PROC EA 15 MIN 12/12/2022 Linn Quevedo OT GO 1               Linn Quevedo OT  12/12/2022

## 2022-12-12 NOTE — CASE MANAGEMENT/SOCIAL WORK
Discharge Planning Assessment  CODY Ellison     Patient Name: Tre Kinney  MRN: [id number]  Today's Date: 12/12/2022    Admit Date: 10/5/2022    Plan: Pt was admitted on 10/5/22 to Swing bed. Pt's insurance issued notice of non medicare coverage. Pt's Last covered day is on 12/14/22 with and expected discharge date of 12/15/22. Patients insurance issued notice of non medicare coverage. SS to continue to follow and assist with discharge planning.     Discharge Plan     Row Name 12/12/22 1651       Plan    Plan Pt was admitted on 10/5/22 to Swing bed. Pt's insurance issued notice of non medicare coverage. Pt's Last covered day is on 12/14/22 with and expected discharge date of 12/15/22. Patients insurance issued notice of non medicare coverage. SS to continue to follow and assist with discharge planning.              XANDER Craig

## 2022-12-12 NOTE — THERAPY TREATMENT NOTE
Acute Care - Physical Therapy Treatment Note   [place]     Patient Name: Tre Kinney  : 1932  MRN: [id number]  Today's Date: 2022   Onset of Illness/Injury or Date of Surgery: 10/06/22  Visit Dx:   No diagnosis found.  Patient Active Problem List   Diagnosis   • Acid reflux   • Ankle arthralgia   • Cardiac conduction disorder   • Closed fracture of distal fibula   • Arteriosclerosis of coronary artery   • Fracture of distal end of tibia   • Elevated cholesterol   • BP (high blood pressure)   • Hypertrophic polyarthritis   • OP (osteoporosis)   • Right heart failure (HCC)   • Heart failure, chronic, right-sided (HCC)   • SOB (shortness of breath)   • Palpitations   • CHF (congestive heart failure) (HCC)   • Congestive heart failure (HCC)   • Precordial pain   • Chronic anticoagulation   • Hypokalemia   • Anemia, unspecified type   • Generalized weakness     Past Medical History:   Diagnosis Date   • Anemia    • Arthritis    • CHF (congestive heart failure) (HCC)    • Chronic kidney disease    • Coronary artery disease    • H/O blood clots    • History of transfusion     no reaction    • Hypertension      Past Surgical History:   Procedure Laterality Date   • ABDOMINAL SURGERY     • APPENDECTOMY     • CARDIAC SURGERY     •  SECTION     • COLONOSCOPY N/A 3/7/2018    Procedure: COLONOSCOPY;  Surgeon: Willie Gresham MD;  Location:  [facility];  Service:    • CORONARY ANGIOPLASTY WITH STENT PLACEMENT     • ENDOSCOPY N/A 3/6/2018    Procedure: ESOPHAGOGASTRODUODENOSCOPY;  Surgeon: Willie Gresham MD;  Location:  [facility];  Service:    • PACEMAKER IMPLANTATION      left side    • SKIN BIOPSY     • TUBAL ABDOMINAL LIGATION       PT Assessment (last 12 hours)     PT Evaluation and Treatment     Row Name 22 1448          Physical Therapy Time and Intention    Subjective Information no complaints  -LL     Document Type therapy note (daily note)  -LL     Mode of Treatment physical  therapy  -LL     Patient Effort good  -LL     Comment Patient seen for PT from 12:50-13:56 with OT for safety during functional mobility.  Son on facetime throughout entire session.  -LL     Row Name 12/12/22 1448          General Information    Patient Profile Reviewed yes  -LL     Limitations/Impairments safety/cognitive  -LL     Row Name 12/12/22 1448          Cognition    Affect/Mental Status (Cognition) WFL  -LL     Orientation Status (Cognition) oriented x 4  -LL     Follows Commands (Cognition) WFL  -LL     Personal Safety Interventions gait belt;muscle strengthening facilitated;supervised activity  -LL     Row Name 12/12/22 1448          Bed Mobility    Comment, (Bed Mobility) Patient UIC  -LL     Row Name 12/12/22 1448          Transfers    Transfers sit-stand transfer;stand-sit transfer  -     Row Name 12/12/22 1448          Sit-Stand Transfer    Sit-Stand [place] (Transfers) moderate assist (50% patient effort);1 person assist  Cues for proper hand placement prior to transfer  -LL     Assistive Device (Sit-Stand Transfers) walker, front-wheeled  -LL     Row Name 12/12/22 1448          Stand-Sit Transfer    Stand-Sit [place] (Transfers) moderate assist (50% patient effort);verbal cues  Cues for proper hand placement when sitting back down  -LL     Assistive Device (Stand-Sit Transfers) walker, front-wheeled  -LL     Row Name 12/12/22 1448          Gait/Stairs (Locomotion)    Gait/Stairs Locomotion gait/ambulation assistive device  -     [place] Level (Gait) moderate assist (50% patient effort);2 person assist;verbal cues;nonverbal cues (demo/gesture)  -     Assistive Device (Gait) walker, front-wheeled  -     Distance in Feet (Gait) 8', 9', 15'  -LL     Pattern (Gait) step-to  -LL     Deviations/Abnormal Patterns (Gait) stride length decreased;gait speed decreased;festinating/shuffling;weight shifting decreased  -LL     Bilateral Gait Deviations weight shift ability decreased;forward  flexed posture;knee buckling bilaterally  -     Row Name 12/12/22 1448          Motor Skills    Therapeutic Exercise --  LAQ, seated marching, pillow squeeze, GS, RTB hip abd/add, RTB ham curl x 20 reps each  -     Row Name 12/12/22 1448          Coping    Observed Emotional State pleasant;cooperative  -     Verbalized Emotional State acceptance  -LL     Family/Support Persons family  -     Row Name 12/12/22 1448          Positioning and Restraints    Pre-Treatment Position sitting in chair/recliner  -LL     Post Treatment Position chair  -LL     In Chair reclined;call light within reach;encouraged to call for assist  With trab table in front of her  -LL           User Key  (r) = Recorded By, (t) = Taken By, (c) = Cosigned By    Initials Name Provider Type     Neda Garcia PTA Physical Therapist Assistant                Physical Therapy Education     Title: PT OT SLP Therapies (Done)     Topic: Physical Therapy (Done)     Point: Mobility training (Done)     Learning Progress Summary           Patient Acceptance, D,E, VU,NR by LL at 12/12/2022 1455    Acceptance, E,TB, VU by RW at 12/9/2022 1451    Acceptance, E,TB, VU by BC at 11/27/2022 1319    Acceptance, E,TB, VU by RM at 11/20/2022 1449    Acceptance, E,TB, VU by RM at 11/18/2022 1021    Acceptance, E,TB, VU by RH at 11/16/2022 1110    Acceptance, E,TB, VU by KK at 11/14/2022 1638    Acceptance, E,TB, VU by KK at 11/13/2022 1648    Acceptance, E,TB, VU by KG at 11/10/2022 1827    Acceptance, E,TB, VU by DM at 11/9/2022 1638    Acceptance, E,TB, VU by KK at 11/8/2022 1754    Acceptance, E,TB, VU by KK at 11/7/2022 1635    Acceptance, E,D, VU,NR by LL at 11/5/2022 1325    Acceptance, E, VU by EB at 11/4/2022 1448    Acceptance, E,TB, VU by KM at 11/4/2022 1101   Family Acceptance, D,E, VU,NR by LL at 12/12/2022 1455                   Point: Home exercise program (Done)     Learning Progress Summary           Patient Acceptance, D,E, VU,NR by LL at  12/12/2022 1455    Acceptance, E,TB, VU by RW at 12/9/2022 1451    Acceptance, E,TB, VU by BC at 11/27/2022 1319    Acceptance, E,TB, VU by RM at 11/20/2022 1449    Acceptance, E,TB, VU by RM at 11/18/2022 1021    Acceptance, E,TB, VU by RH at 11/16/2022 1110    Acceptance, E,TB, VU by KK at 11/14/2022 1638    Acceptance, E,TB, VU by KK at 11/13/2022 1648    Acceptance, E,TB, VU by KG at 11/10/2022 1827    Acceptance, E,TB, VU by DM at 11/9/2022 1638    Acceptance, E,TB, VU by KK at 11/8/2022 1754    Acceptance, E,TB, VU by KK at 11/7/2022 1635    Acceptance, E,D, VU,NR by LL at 11/5/2022 1325    Acceptance, E, VU by EB at 11/4/2022 1448    Acceptance, E,TB, VU by KM at 11/4/2022 1101   Family Acceptance, D,E, VU,NR by LL at 12/12/2022 1455                   Point: Body mechanics (Done)     Learning Progress Summary           Patient Acceptance, D,E, VU,NR by LL at 12/12/2022 1455    Acceptance, E,TB, VU by RW at 12/9/2022 1451    Acceptance, E,TB, VU by BC at 11/27/2022 1319    Acceptance, E,TB, VU by RM at 11/20/2022 1449    Acceptance, E,TB, VU by RM at 11/18/2022 1021    Acceptance, E,TB, VU by RH at 11/16/2022 1110    Acceptance, E,TB, VU by KK at 11/14/2022 1638    Acceptance, E,TB, VU by KK at 11/13/2022 1648    Acceptance, E,TB, VU by KG at 11/10/2022 1827    Acceptance, E,TB, VU by DM at 11/9/2022 1638    Acceptance, E,TB, VU by KK at 11/8/2022 1754    Acceptance, E,TB, VU by KK at 11/7/2022 1635    Acceptance, E,D, VU,NR by LL at 11/5/2022 1325    Acceptance, E, VU by EB at 11/4/2022 1448    Acceptance, E,TB, VU by KM at 11/4/2022 1101   Family Acceptance, D,E, VU,NR by LL at 12/12/2022 1455                   Point: Precautions (Done)     Learning Progress Summary           Patient Acceptance, D,E, VU,NR by LL at 12/12/2022 1455    Acceptance, E,TB, VU by RW at 12/9/2022 1451    Acceptance, E,TB, VU by BC at 11/27/2022 1319    Acceptance, E,TB, VU by RM at 11/20/2022 1449    Acceptance, E,TB, VU by RM at  11/18/2022 1021    Acceptance, E,TB, VU by RH at 11/16/2022 1110    Acceptance, E,TB, VU by KK at 11/14/2022 1638    Acceptance, E,TB, VU by KK at 11/13/2022 1648    Acceptance, E,TB, VU by KG at 11/10/2022 1827    Acceptance, E,TB, VU by DM at 11/9/2022 1638    Acceptance, E,TB, VU by KK at 11/8/2022 1754    Acceptance, E,TB, VU by KK at 11/7/2022 1635    Acceptance, E,D, VU,NR by LL at 11/5/2022 1325    Acceptance, E, VU by EB at 11/4/2022 1448    Acceptance, E,TB, VU by KM at 11/4/2022 1101   Family Acceptance, D,E, VU,NR by LL at 12/12/2022 1455                               User Key     Initials Effective Dates Name Provider Type Discipline    LL 05/02/16 -  Neda Garcia PTA Physical Therapist Assistant PT    RM 06/14/22 -  Bethany Krause, RN Registered Nurse Nurse    DM 06/16/21 -  Moniz, Deborah, RN Registered Nurse Nurse    BC 08/02/22 -  Collett, Brittany, RN Registered Nurse Nurse    KM 05/24/22 -  Mao Vázquez PT Physical Therapist PT    EB 09/22/22 -  Cedric Oliver, RN Registered Nurse Nurse    KK 06/27/22 -  Chelsi Bolivar, RN Registered Nurse Nurse    KG 09/22/22 -  Verónica Rodriguez, RN Registered Nurse Nurse    RW 09/22/22 -  Bethany Schofield, RN Registered Nurse Nurse    RH 08/03/22 -  Jung Russell, RN Extern Registered Nurse Nurse              PT Recommendation and Plan         Outcome Measures     Row Name 12/12/22 1448 12/10/22 1200 12/09/22 1637       How much help from another person do you currently need...    Turning from your back to your side while in flat bed without using bedrails? 3  -LL -- --    Moving from lying on back to sitting on the side of a flat bed without bedrails? 3  -LL -- --    Moving to and from a bed to a chair (including a wheelchair)? 2  -LL -- --    Standing up from a chair using your arms (e.g., wheelchair, bedside chair)? 2  -LL -- --    Climbing 3-5 steps with a railing? 1  -LL -- --    To walk in hospital room? 2  -LL -- --    AM-PAC 6 Clicks Score (PT)  13  -LL -- --       How much help from another is currently needed...    Putting on and taking off regular lower body clothing? 2  -KP 2  -HB 2  -KR    Bathing (including washing, rinsing, and drying) 2  -KP 2  -HB 2  -KR    Toileting (which includes using toilet bed pan or urinal) 3  -KP 3  -HB 3  -KR    Putting on and taking off regular upper body clothing 3  -KP 3  -HB 3  -KR    Taking care of personal grooming (such as brushing teeth) 3  -KP 3  -HB 3  -KR    Eating meals 4  -KP 4  -HB 4  -KR    AM-PAC 6 Clicks Score (OT) 17  -KP 17  -HB 17  -KR       Functional Assessment    Outcome Measure Options AM-PAC 6 Clicks Basic Mobility (PT)  -LL -- --          User Key  (r) = Recorded By, (t) = Taken By, (c) = Cosigned By    Initials Name Provider Type    Andres Ayala, OT Occupational Therapist    LL Neda Garcia PTA Physical Therapist Assistant    Linn Richardson, OT Occupational Therapist     Katelynn Fernández, OT Occupational Therapist                 Time Calculation:    PT Charges     Row Name 12/12/22 1456             Time Calculation    Start Time 1250  -LL      Stop Time 1336  -LL      Time Calculation (min) 46 min  -LL      PT Received On 12/12/22  -LL         Time Calculation- PT    Total Timed Code Minutes- PT 46 minute(s)  -LL            User Key  (r) = Recorded By, (t) = Taken By, (c) = Cosigned By    Initials Name Provider Type     Neda Garcia PTA Physical Therapist Assistant              Therapy Charges for Today     Code Description Service Date Service Provider Modifiers Qty    51704299396 HC GAIT TRAINING EA 15 MIN 12/12/2022 Neda Garcia PTA GP, CQ 1    00658319381 HC PT THER PROC EA 15 MIN 12/12/2022 Neda Garcia PTA GP, CQ 1          PT G-Codes  Outcome Measure Options: AM-PAC 6 Clicks Basic Mobility (PT)  AM-PAC 6 Clicks Score (PT): 13  AM-PAC 6 Clicks Score (OT): 17    Neda. NACHO Garcia  12/12/2022     verbal

## 2022-12-12 NOTE — PAYOR COMM NOTE
"Tre Kinney (89 y.o. Female)    YOB: 1932  Social Security Number:   Address: [address] [place] [address]  Home Phone: [phone]  MRN: [id number]  Anabaptism: Muslim  Marital Status:         Admission Date: 10/5/22  Admission Type: Urgent  Admitting Provider: Jannet Lagunas MD  Attending Provider: Jannet Lagunas MD  Department, Room/Bed: [place] [address]/  Discharge Date:   Discharge Disposition:   Discharge Destination:               Attending Provider: Jannet Lagunas MD    Allergies: Adhesive Tape, Xanax [Alprazolam]    Isolation: None   Infection: None   Code Status: No CPR    Ht: 170.2 cm (67.01\")   Wt: 78.8 kg (173 lb 12.8 oz)    Admission Cmt: None   Principal Problem: None                Active Insurance as of 10/5/2022     Primary Coverage     Payor Plan Insurance Group Employer/Plan Group    ANTH MEDICARE REPLACEMENT ANTHEM MEDICARE ADVANTAGE [id number]     Payor Plan Address Payor Plan Phone Number Payor Plan Fax Number Effective Dates    [address] [phone]  2022 - None Entered    [facility] [address]       Subscriber Name Subscriber Birth Date Member ID       TRE KINNEY 1932 [id number]                 Emergency Contacts      (Rel.) Home Phone Work Phone Mobile Phone    Valdez Kinney (Son) [phone] None None    Leno Kinney (Son) [phone] None [phone]    Willian Kinney (Son) None None [phone]               History & Physical      Jannet Lagunas MD at 10/05/22 1253              [facility] Medicine Services  History & Physical    Patient Identification:  Name:  Tre Kinney  Age:  89 y.o.  Sex:  female  :  1932  MRN:  [id number]   Visit Number:  [id number]  Primary Care Physician:  Jessica Pedro APRN      Please note that the patient is being admitted from [facility] hospitalist service to [facility] bed " service for generalized debility as she needs strengthening.  The following is a summary of the [facility] hospitalization:      Date of Admission: 9/19/2022  Date of Discharge: 10/5/2022, to [facility] swing bed     PCP: Jessica Pedro APRN     DISCHARGE DIAGNOSIS  -Acute blood loss anemia that was present on admission and due to a right hip traumatic hematoma in the setting of the supratherapeutic INR, status post 3 units packed red blood cells transfused this admission  -Supratherapeutic INR  that was present on admission, in a patient on chronic anticoagulation with Coumadin  for paroxysmal atrial fibrillation (goal INR 2.0-3.0), nail anticoagulation has been discontinued  -Acute superior endplate compression fracture of L1 with retropulsion (9/8/2022)  -Leukocytosis on admission, suspect due to mechanical fall that occurred prior to admission  -Acute kidney injury on top of chronic kidney disease stage IV (baseline creatinine 2.1-2.4 and admission creatinine 6.1)  -Anion gap metabolic acidosis present on admission, suspect due to a combination of lactic acidosis from the acute blood loss anemia and renal acidosis  -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 Vazquez and SANDRA Jaquez with palliative care  Dr. Sanchez with nephrology  Dr. Bell with general surgery  Dr. Ho and SANDRA Xiong with orthopedic surgery     PROCEDURES PERFORMED  9/19/2022: Transfusion of two units of PRBCs on each day  9/21/2022: Transfusion of one unit of PRBCs on each  day  9/19/2022-9/25/2022:  Left cephalic single lumen midline catheter     HOSPITAL COURSE  Patient is a  89 y.o.  female presented to [facility] complaining of a mechanical fall with inability to get up.  In our emergency department, the patient was noted to have a hemoglobin level of 6.3.  Thus, she was admitted to the progressive care unit for further evaluation and treatment.  Please see the admitting history and physical for further details.  On initial exam, a large right hip hematoma was seen and was suspected to be the cause of the acute blood loss.  She was given Kcentra in the emergency department as well as vitamin K.  She ended up needing 3 units during the first 3 days of her hospitalization.  Afterwards, her hemoglobin was stable and she did not need any further treatment.  The hematoma seem to be resolving daily during her hospitalization.  Since the patient had a diverticular bleed in 2018 and has re-bled again now to the point of acute blood loss anemia, it has been determined that the risk of anticoagulation outweighs the benefit in this patient will be discharged without any anticoagulation.    Also, the patient had DIANA on top CKD stage IV on admission; nephrology was consulted.  With IV fluids (the initial ones containing bicarb) and blood transfusions, her renal function stabilized to normal and she will just need follow up with nephrology once she is out of this hospital.     Incidentally, the patient had a vertebral fracture that we believe occurred earlier this month.  Also, there was question of a right distal tib fracture and as such orthopedic surgery was consulted.  No acute fractures were present per their evaluation and the patient was instructed to ambulate and weight-bear as tolerated.  PT and OT saw the patient and she is severely weak; thus, she will be admitted to swing bed for strengthening.     The patient reported dysuria later in the hospitalization; UA was abnormal  but no bacteria grew on the culture.  Our team started Rocephin and this was continued even after the urine culture did not grow because her dysuria resolved.  Please note that the patient finished a course of Rocephin for an acute UTI while she was hospitalized.       During the end of her hospitalization, she had dyspnea.  She was on Lasix at home and chest x-ray here showed some mild CHF.  Thus, we gave the patient some doses of IV Bumex and then increase the home Lasix from 40 mg once a day to 40 mg twice a day.  We will periodically check her blood work to see how the creatinine and the electrolytes are trending.  Please note that we did obtain a Reds vest measurement and it was elevated prior to the doses of IV Bumex.  However, today's Reds vest measurement indicates that her lungs are now dried out.            ----------------------------------------------------------------------------------------------------------------------    The following is the history and physical for the [facility] hospitalization that is summarized above:  Now  Subjective     Chief complaint:  Fall    History of presenting illness:  Tre Kinney is a 89 y.o. female with past medical history significant for CHF, chronic kidney disease, coronary artery disease s/p stenting, history of blood clots, chronic anticoagulation with Coumadin, SSS s/p pacemaker placement, chronic diastolic CHF, and iron deficiency anemia who presents today with a chief complaint of a fall.     Patient reports that she has had 2 falls within the past month.  For the first fall she was evaluated in the emergency department on September 8, 2022 and was complaining of pain in her bilateral lower back and abdomen.  On CT imaging of her lumbar spine she had a moderate acute superior endplate vertebral compression fracture at the L1 with about 40% loss of vertebral body height and mild posterior osseous retropulsion of about 6 mm and also noted to have  chronic compression fracture deformities involving the inferior endplates of L3 and L4.  She was discharged home with a 3-day supply of Norco 7.5 mg, a back brace, and instructions to follow-up with Dr. Krishna in the outpatient setting. Patient reports that she wasn't able to attend her follow-up with orthopedic surgery as she lives with her son who has no vehicle and has no other form of transportation to make it to her doctor's appointments.     She presents today after having a mechanical fall while attempting to get to her telephone that was across the room in her bedroom. She states that she is not for sure what caused her to fall, but landed primarily on her right side. She denies any syncope, dizziness, light-headedness, chest pain, or shortness of breath that contributed to her fall. She does ambulate with a walker at home. She states that her other son that lives close by was able to come over and assist her out of the floor. EMS was called and transported her to the hospital. She was given Norco 5 mg in the ED and reports that she is currently not in any pain. It was noted on her labs that her hemoglobin was 6.1 upon arrival. She does have a large hematoma on her right side from her fall. She states she has also been having dark appearing stools at home. She denies any hematemesis or coffee ground emesis. She states her last EGD/colonoscopy was in 2018 when she was admitted to [facility] which is detailed below. She did state that she has had frequent episodes of midsternal chest pain mostly while lying in the bed at night. She states that it will radiate to bilateral arms and she has associated nausea and diaphoresis. She reports that she will take a nitroglycerin which will relieve her chest pain. She also endorses dysuria and foul-smelling urine. She denies any abdominal pain, nausea, vomiting, or diarrhea. She is currently alert and oriented to self, place, date of birth, year, and president. She currently  resides with her son who assists her with her medications.    Of note, patient was transferred from [facility] to [facility] in March of 2018 due to symptomatic acute blood loss anemia due to rectal bleeding.  She was also on Coumadin at that time for atrial fibrillation and prior blood clots.  GI services was consulted and she underwent an EGD and colonoscopy during that stay.  Her EGD showed no active bleeding but evidence of gastritis.  Colonoscopy revealed a few polyps that were not removed or biopsied  And also found to have diverticulosis and likely diverticular bleed since resolved.  She did receive 2 units packed red blood cells with no further bleeding noted.  Her Coumadin was held throughout her hospital stay and 3 days after discharge, but was resumed per PCP recommendations with them to manage Coumadin and on orders.    Upon arrival to the ED, vital signs were temperature 97.7, heart rate 87, respirations 18, blood pressure 127/60, SPO2 97% on room air.  CMP with glucose 137, sodium 130, potassium 5.5, CO2 17, chloride 93, anion gap 20, creatinine 6.11, BUN 31, EGFR 6.1, albumin 3.43, otherwise unremarkable.  CBC with WBC 15.87, RBC 2.16, hemoglobin 6.3, hematocrit 19.6, RDW 17, RDW-SD 55.5, otherwise unremarkable.    Known Emergency Department medications received prior to my evaluation included IV Rocephin, Norco 5 mg, IV Zofran 4 mg, IV phytonadione.     Patient will be admitted to the PCU for further evaluation and monitoring.    ---------------------------------------------------------------------------------------------------------------------   Review of Systems   Constitutional: Positive for diaphoresis (associated with chest pain). Negative for activity change, chills and fever.   HENT: Negative for congestion and rhinorrhea.    Eyes: Negative for discharge and redness.   Respiratory: Negative for apnea, cough and shortness of breath.    Cardiovascular: Positive for  chest pain (midsternal; radiates to bilateral arms). Negative for palpitations.   Gastrointestinal: Positive for nausea (associated with chest pain). Negative for abdominal distention, abdominal pain, diarrhea and vomiting.   Genitourinary: Positive for dysuria. Negative for difficulty urinating, frequency and urgency.        Reports foul smelling urine   Musculoskeletal: Positive for gait problem (uses walker to ambulate). Negative for arthralgias and myalgias.   Skin: Positive for color change (bruising from recent falls). Negative for wound.   Neurological: Negative for dizziness, syncope and light-headedness.   Psychiatric/Behavioral: Negative for agitation, behavioral problems and confusion.        ---------------------------------------------------------------------------------------------------------------------   Past Medical History:   Diagnosis Date   • Anemia    • Arthritis    • CHF (congestive heart failure) (HCC)    • Chronic kidney disease    • Coronary artery disease    • H/O blood clots    • History of transfusion     no reaction    • Hypertension      Past Surgical History:   Procedure Laterality Date   • ABDOMINAL SURGERY     • APPENDECTOMY     • CARDIAC SURGERY     •  SECTION     • COLONOSCOPY N/A 3/7/2018    Procedure: COLONOSCOPY;  Surgeon: Willie Gresham MD;  Location:  [facility];  Service:    • CORONARY ANGIOPLASTY WITH STENT PLACEMENT     • ENDOSCOPY N/A 3/6/2018    Procedure: ESOPHAGOGASTRODUODENOSCOPY;  Surgeon: Willie Gresham MD;  Location:  [facility];  Service:    • PACEMAKER IMPLANTATION      left side    • SKIN BIOPSY     • TUBAL ABDOMINAL LIGATION       Family History   Problem Relation Age of Onset   • Other Mother         cardiac disorder   • Hypertension Mother    • Heart disease Mother    • Thyroid disease Mother    • Other Father         cardiac disorder   • Heart disease Father    • Hypertension Father    • Other Sister         cardiac disorder   • Other  Brother         cardiac disorder   • Other Other         cardiac disorder   • Other Other         cardiac disorder     Social History     Socioeconomic History   • Marital status:    Tobacco Use   • Smoking status: Never Smoker   Substance and Sexual Activity   • Alcohol use: No   • Drug use: No   • Sexual activity: Defer     ---------------------------------------------------------------------------------------------------------------------   Allergies:  Adhesive tape and Xanax [alprazolam]  ---------------------------------------------------------------------------------------------------------------------   Home medications:   Current listed hospital scheduled medications may not yet reflect those currently placed in orders that are signed and held awaiting patient's arrival to floor.   ---------------------------------------------------------------------------------------------------------------------      Objective      Vital Signs:  Temp:  [97.5 °F (36.4 °C)-98.1 °F (36.7 °C)] 98.1 °F (36.7 °C)  Heart Rate:  [71-93] 87  Resp:  [16-20] 20  BP: (114-157)/(49-87) 127/58  Vitals             09/19/22  1052   Weight: 72.6 kg (160 lb)         Body mass index is 25.06 kg/m².  ---------------------------------------------------------------------------------------------------------------------       Physical Exam  Constitutional:       General: She is awake.      Appearance: Normal appearance. She is overweight.      Interventions: Nasal cannula in place.      Comments: Resting comfortably in bed. Appears in no acute distress. Nasal cannula in place.    HENT:      Head: Normocephalic and atraumatic.      Right Ear: External ear normal.      Left Ear: External ear normal.      Nose: Nose normal.      Mouth/Throat:      Mouth: Mucous membranes are dry.      Pharynx: Oropharynx is clear.   Eyes:      Extraocular Movements: Extraocular movements intact.      Conjunctiva/sclera: Conjunctivae normal.      Pupils:  Pupils are equal, round, and reactive to light.   Cardiovascular:      Rate and Rhythm: Normal rate and regular rhythm.      Pulses: Normal pulses.           Dorsalis pedis pulses are 2+ on the right side and 2+ on the left side.        Posterior tibial pulses are 2+ on the right side and 2+ on the left side.      Heart sounds: Normal heart sounds. No murmur heard.    No friction rub. No gallop.   Pulmonary:      Effort: Pulmonary effort is normal. No respiratory distress.      Breath sounds: Normal breath sounds. No wheezing, rhonchi or rales.      Comments: Nasal cannula placed for comfort; saturations currently 97%.   Abdominal:      General: Abdomen is flat. Bowel sounds are normal. There is no distension.      Palpations: Abdomen is soft.      Tenderness: There is no abdominal tenderness. There is no guarding.   Musculoskeletal:         General: Normal range of motion.      Right lower leg: No edema.      Left lower leg: No edema.   Skin:     General: Skin is warm and dry.      Coloration: Skin is pale.      Findings: Bruising present. No erythema.          Neurological:      General: No focal deficit present.      Mental Status: She is alert and oriented to person, place, and time. Mental status is at baseline.      Comments: Alert and oriented x 4. No focal deficits noted.    Psychiatric:         Mood and Affect: Mood normal.         Behavior: Behavior normal. Behavior is cooperative.         Thought Content: Thought content normal.         ---------------------------------------------------------------------------------------------------------------------  EKG:            Telemetry:       I have personally looked at both the EKG and the telemetry strips.  ---------------------------------------------------------------------------------------------------------------------     Results from last 7 days   Lab Units 09/19/22  1740 09/19/22  2124 09/19/22  1608 09/19/22  1521   LACTATE mmol/L  --  1.3 2.3*  --     WBC 10*3/mm3  --   --   --  15.87*   HEMOGLOBIN g/dL  --   --   --  6.3*   HEMATOCRIT %  --   --   --  19.6*   MCV fL  --   --   --  90.7   MCHC g/dL  --   --   --  32.1   PLATELETS 10*3/mm3  --   --   --  250   INR   >10.00*  --   --   --            Results from last 7 days   Lab Units 09/19/22  1521   SODIUM mmol/L 130*   POTASSIUM mmol/L 5.5*   CHLORIDE mmol/L 93*   CO2 mmol/L 17.0*   BUN mg/dL 91*   CREATININE mg/dL 6.11*   CALCIUM mg/dL 9.0   GLUCOSE mg/dL 137*   ALBUMIN g/dL 3.43*   BILIRUBIN mg/dL 0.9   ALK PHOS U/L 77   AST (SGOT) U/L 19   ALT (SGPT) U/L 8   Estimated Creatinine Clearance: 7.2 mL/min (A) (by C-G formula based on SCr of 6.11 mg/dL (H)).         Results from last 7 days   Lab Units 09/19/22  1521   TROPONIN T ng/mL 0.036*           Results from last 7 days   Lab Units 09/19/22  1521   PROBNP pg/mL 11,666.0*            Lab Results   Component Value Date     TSH 4.999 (H) 11/10/2017      ---------------------------------------------------------------------------------------------------------------------  Imaging Results (Last 7 Days)      Procedure Component Value Units Date/Time     CT Abdomen Pelvis Without Contrast [[id number]] Collected: 09/19/22 2205       Updated: 09/19/22 2207     Narrative:       CT Abdomen Pelvis WO     INDICATION:   Abdominal pain.     TECHNIQUE:   CT of the abdomen and pelvis without IV contrast. Coronal and sagittal reconstructions were obtained.  Radiation dose reduction techniques included automated exposure control or exposure modulation based on body size. Count of known CT and cardiac nuc  med studies performed in previous 12 months: 2.      COMPARISON:   None available.     FINDINGS:  Abdomen: There is mild scarring and volume loss at the left lung base. There is a moderate-sized hiatal hernia. The liver contains a small cyst or hemangioma in the right lobe measuring 1.5 cm in diameter with a similar benign-appearing lesion in the  lateral segment of the left  lobe just under the capsule measuring 1 cm. The spleen and pancreas are normal in size. There is bilateral renal atrophy, worse on the right. There is a 3.5 cm right renal cyst. No hydronephrosis. The adrenal glands are normal  in size. No evidence of retroperitoneal adenopathy. No distended bowel loops.     Pelvis: No appendix is seen. No inflammatory changes are seen around the cecum. There is sigmoid diverticulosis. No acute inflammatory changes are seen. No enlarged pelvic lymph nodes are seen. There is subcutaneous streaking of fat in the right gluteal  area that could represent either inflammatory change or hematoma. It is new since a pelvic CT on 922.        Impression:       Subcutaneous swelling in the gluteal area most likely representing a gluteal hematoma. This is a new finding since the recent pelvic CT. No acute or inflammatory changes are seen elsewhere in the abdomen or pelvis. Also noted is a hiatal hernia, renal  atrophy and diverticulosis.              Signer Name: Bubba Mancuso MD   Signed: 9/19/2022 10:05 PM   Workstation Name: [id number]    [facility]     CT Lower Extremity Right Without Contrast [[id number]] Collected: 09/19/22 1440       Updated: 09/19/22 1443     Narrative:       EXAMINATION: CT LOWER EXTREMITY RIGHT WO CONTRAST-      CLINICAL INDICATION: tib/fib (questionable tibial plateau and distal  tibia fracture fracture on x-ray)        COMPARISON: X-rays of the right lower extremity dated 09/19/2022     Radiation dose reduction techniques were utilized per ALARA protocol.  Automated exposure control was initiated through either or CareDose or  DoseRight software packages by  protocol.           PROCEDURE: Axial images were acquired from the distal femur through the  ankle without any IV contrast. Reformatted images were created.     FINDINGS:  There is joint space narrowing at the knee.  Irregularity of the lateral tibial plateau probably due to  an old injury  and or arthritic change, but no convincing evidence of acute fracture on  the presented study     Otherwise no evidence of acute fracture.        Impression:       1. Extensive arthritic change at the knee, but no acute fracture is seen        This report was finalized on 9/19/2022 2:41 PM by Dr. Bob Lovell MD.        XR Hip With or Without Pelvis 2 - 3 View Right [[id number]] Collected: 09/19/22 1306       Updated: 09/19/22 1332     Narrative:       EXAM:    XR Right Hip With Pelvis When Performed, 2 or 3 Views     EXAM DATE:    9/19/2022 12:11 PM     CLINICAL HISTORY:    trauma     TECHNIQUE:    Two or three views of the right hip with pelvis when performed.     COMPARISON:    No relevant prior studies available.     FINDINGS:    Bones/joints:  Unremarkable.  No acute fracture.  No dislocation.    Soft tissues:  Unremarkable.        Impression:         No acute findings in the right hip.     This report was finalized on 9/19/2022 1:07 PM by Dr. Bob Lovell MD.        XR Femur 2 View Right [[id number]] Collected: 09/19/22 1306       Updated: 09/19/22 1308     Narrative:       EXAM:    XR Right Femur, 2 Views     EXAM DATE:    9/19/2022 12:12 PM     CLINICAL HISTORY:    fall     TECHNIQUE:    Frontal and lateral views of the right femur.     COMPARISON:    No relevant prior studies available.     FINDINGS:    Bones/joints:  Unremarkable.  No acute fracture.  No dislocation.    Soft tissues:  Unremarkable.        Impression:         No acute findings in the right femur.     This report was finalized on 9/19/2022 1:06 PM by Dr. Bob Lovell MD.        XR Tibia Fibula 2 View Right [[id number]] Collected: 09/19/22 1305       Updated: 09/19/22 1308     Narrative:       EXAM:    XR Right Tibia and Fibula, 2 Views     EXAM DATE:    9/19/2022 12:11 PM     CLINICAL HISTORY:    fall     TECHNIQUE:    Frontal and lateral views of the right tibia and fibula.     COMPARISON:    No relevant prior studies  available.     FINDINGS:    Bones/joints:  Mild irregularity of the lateral tibial plateau. Cannot  exclude nondisplaced fracture.  No dislocation.    Soft tissues:  Unremarkable.  No radiopaque foreign body.    Other findings:  Demineralization.        Impression:         Mild irregularity of the lateral tibial plateau. Cannot exclude  nondisplaced fracture.     This report was finalized on 9/19/2022 1:06 PM by Dr. Bob Lovell MD.        XR Ankle 3+ View Right [[id number]] Collected: 09/19/22 1304       Updated: 09/19/22 1307     Narrative:       EXAM:    XR Right Ankle Complete, 3 or More Views     EXAM DATE:    9/19/2022 12:11 PM     CLINICAL HISTORY:    fall     TECHNIQUE:    Frontal, lateral and oblique views of the right ankle.     COMPARISON:    No relevant prior studies available.     FINDINGS:    Bones/joints:  Oblique lucency through the distal tibia medially may  represent a nondisplaced fracture.  Old fracture of the distal fibula.   No dislocation.    Soft tissues:  Unremarkable.        Impression:         Oblique lucency through the distal tibia medially may represent a  nondisplaced fracture.     This report was finalized on 9/19/2022 1:05 PM by Dr. Bob Lovell MD.        XR Foot 3+ View Right [[id number]] Collected: 09/19/22 1304       Updated: 09/19/22 1306     Narrative:       EXAM:    XR Right Foot Complete, 3 or More Views     EXAM DATE:    9/19/2022 12:11 PM     CLINICAL HISTORY:    fall     TECHNIQUE:    Frontal, lateral and oblique views of the right foot.     COMPARISON:    No relevant prior studies available.     FINDINGS:    Bones/joints:  Unremarkable.  No acute fracture.  No dislocation.    Soft tissues:  Unremarkable.  No radiopaque foreign body.        Impression:         No acute findings in the right foot.     This report was finalized on 9/19/2022 1:04 PM by Dr. Bob Lovell MD.          Last echocardiogram:  Results for orders placed during the hospital encounter of  09/19/22    Adult Transthoracic Echo Complete W/ Cont if Necessary Per Protocol    Interpretation Summary  · Left ventricular ejection fraction appears to be 51 - 55%. Left ventricular systolic function is low normal.  · Left ventricular wall thickness is consistent with mild concentric hypertrophy.  · Left ventricular diastolic function was indeterminate.  · The right ventricular cavity is moderately dilated.  · Moderately reduced right ventricular systolic function noted.  · The left atrial cavity is moderate to severely dilated  · The right atrial cavity is moderately dilated.  · Moderate tricuspid valve regurgitation is present.  · Estimated right ventricular systolic pressure from tricuspid regurgitation is markedly elevated (>55 mmHg).  · Severe pulmonary hypertension is present.  · Electronic lead present in the ventricle  · There is no evidence of pericardial effusion    I have personally reviewed the above radiology images and read the final radiology report on 10/05/22  ---------------------------------------------------------------------------------------------------------------------  Assessment / Plan     ASSESSMENT/PLAN:  -Acute life-threatening anemia likely secondary to large gluteal hematoma, POA  -History of acute blood loss anemia in 2018 suspected to be from diverticular bleed   -History of iron deficiency anemia   -Hemoglobin and hematocrit upon arrival 6.3 and 19.6 respectively; previously during ED visit on 09/08 hemoglobin 12.4 and hematocrit 38.8.   -Last colonoscopy/EGD in 2018 during [facility] admission. EGD showed no active bleeding but evidence of gastritis; Colonoscopy revealed a few polyps that were not removed or biopsied, also found to have diverticulosis and likely diverticular bleed since resolved.  -Reports episodes of melena at home; will monitor if patient has additional episodes of melena during admission.   -ED provider ordered 2 U PRBC's; nursing communication in chart to order  repeat CBC after first unit of blood.   -Obtain iron, vitamin B12, and folate.    -Possible acute, nondisplaced, right lateral tibial plateau and distal tibia fractures, POA  -Traumatic right gluteal hematoma, POA  -Acute superior endplate compression fracture of L1 with retropulsion (09/08/22)  -Leukocytosis, likely reactive to fall  -Mechanical fall  -XR of right lower extremity with findings noted above.   -CT abdomen/pelvis with subcutaneous swelling swelling in gluteal area representing gluteal hematoma.   -Vertebral fracture found during 09/08/22 ED visit 2/2 fall and was discharged home with back brace with instruction to follow-up with orthopedic surgery, but missed appointment due to transportation.   -Inpatient orthopedic and general surgery consulted, input and assistance much appreciated.   -Will keep NPO pending surgical evaluation.   -Up with assistance, fall precautions.   -Neurovascular checks every 4 hours.   -PT/OT consults ordered.     -Acute on chronic kidney disease stage IV, POA  -Bilateral renal atrophy, R > L   -Right renal cyst (3.5 cm)  -Anion-gap metabolic acidosis   -Lactic acidosis   -Mild hyperkalemia   -Creatinine upon arrival 6.11; previously during ED visit on 09/08 creatinine 2.45; baseline appears to be 2.1-2.4.   -CT A/P reviewed with findings noted above.   -Lactate 2.3 upon arrival, but has normalized after fluids.   -Received 500 mL fluid bolus in ED; will hold on additional fluids at this time as 2 units of blood to be transfused.   -Inpatient nephrology consulted, input and assistance much appreciated.   -Obtain creatinine kinase to evaluate for rhabdomyolysis in setting of fall.   -Obtain urine electrolytes and urine protein/creatinine ratio.   -Avoid nephrotoxins as able; planning to hold home Lasix and Neurontin.     -Supratherapeutic INR on chronic anticoagulation with Coumadin (goal INR 2.0-3.0)  -History of blood clots, details unknown  -Paroxysmal atrial fibrillation    -SSS s/p pacemaker placement  -PT and PTT > 100 and INR > 10 upon arrival.   -Warfarin reversal initiated in ED with IV Vitamin K 10 units and IV KCentra 3630 units.   -Daily PT/INR ordered.   -Will hold Coumadin for now.     -Chest pain with typical features, POA  -Coronary artery disease s/p stenting   -Reports intermittent episodes of midsternal chest pain at home.   -Troponin T 0.036; continue to trend.   -EKG without acute ischemic abnormalities present.   -Obtain a.m. TTE.   -May consider cardiology consult.     -Acute urinary tract infection, POA  -Reported dysuria  -UA with 3 + leukocytes, 21-30 WBC, 2+ bacteria, 36 comets epithelium  -Pending urine culture.  -Received IV Rocephin in ED; will continue on admission as patient is symptomatic.    -Chronic diastolic CHF  -Echo from 12/2017 reviewed and noted above.   -Appears euvolemic on exam.   -Obtain TTE as noted above.   -Monitor with strict I/O's and daily weights.     -Generalized anxiety disorder  -Supportive care.   -Review home medications once reconciled.     -Debility  -Palliative care consult.   - consult.   ----------  -DVT prophylaxis: SCD's contraindicated 2/2 frature  -Activity: Up with assistance, fall precautions  -Expected length of stay:   INPATIENT status due to the need for care which can only be reasonably provided in an hospital setting such as aggressive/expedited ancillary services and/or consultation services, the necessity for IV medications, close physician monitoring and/or the possible need for procedures.  In such, I feel patient's risk for adverse outcomes and need for care warrant INPATIENT evaluation and predict the patient's care encounter to likely last beyond 2 midnights.    Medical Intervention Limits: NO intubation (DNI)  Level Of Support Discussed With: Patient; Next of Kin (If No Surrogate)  Code Status (Patient has no pulse and is not breathing): No CPR (Do Not Attempt to Resuscitate)  Medical  Interventions (Patient has pulse or is breathing): Limited Support  Comments: Patient states that she does not want to be resuscitated or put on a ventilator, her son Leno supports her decision    High risk secondary to acute life-threatening anemia likely secondary to large gluteal hematoma, possible acute nondisplaced right lateral tibial plateau and distal tibia fractures, acute on chronic CKD stage IV, supratherapeutic INR     Disposition: Pending clinical course    Michelle Sellers PA-C  09/20/22  01:28 EDT    ---------------------------------------------------------------------------------------------------------------------       Jannet Lagunas MD  [facility]  10/05/22  16:57 EDT      Electronically signed by Jannet Lagunas MD at 10/05/22 1706         Current Facility-Administered Medications   Medication Dose Route Frequency Provider Last Rate Last Admin   • acetaminophen (TYLENOL) tablet 650 mg  650 mg Oral Q6H PRN Mustapha Loredo DO   650 mg at 11/26/22 0906   • apixaban (ELIQUIS) tablet 5 mg  5 mg Oral Q12H Mustapha Loredo DO   5 mg at 12/12/22 0801   • benzocaine-menthol (DERMOPLAST) 20-0.5 % topical spray   Topical 4x Daily PRN Kennedi Saucedo MD   Given at 11/03/22 0548   • Diclofenac Sodium (VOLTAREN) 1 % gel 4 g  4 g Topical 4x Daily Jannet Lagunas MD   4 g at 12/12/22 1126   • docusate sodium (COLACE) capsule 100 mg  100 mg Oral BID PRN Kennedi Saucedo MD   100 mg at 11/26/22 0909   • escitalopram (LEXAPRO) tablet 10 mg  10 mg Oral Daily Jnanet Lagunas MD   10 mg at 12/12/22 0801   • furosemide (LASIX) tablet 40 mg  40 mg Oral BID Jannet Lagunas MD   40 mg at 12/12/22 0801   • gabapentin (NEURONTIN) capsule 300 mg  300 mg Oral Nightly Jannet Lagunas MD   300 mg at 12/11/22 2038   • guaiFENesin-dextromethorphan (ROBITUSSIN DM) 100-10 MG/5ML syrup 5 mL  5 mL Oral Q4H PRN Kennedi Saucedo MD       • hydrALAZINE  (APRESOLINE) injection 10 mg  10 mg Intravenous Q6H PRN Mustapha Loredo DO       • hydrALAZINE (APRESOLINE) tablet 5 mg  5 mg Oral Q8H Jannet Lagunas MD   5 mg at 12/12/22 0514   • HYDROcodone-acetaminophen (NORCO) 7.5-325 MG per tablet 1 tablet  1 tablet Oral Q8H PRN Jannet Lagunas MD   1 tablet at 12/08/22 2129   • Magnesium Sulfate 2 gram Bolus, followed by 8 gram infusion (total Mg dose 10 grams)- Mg less than or equal to 1mg/dL  2 g Intravenous PRN Jannet Lagunas MD        Or   • Magnesium Sulfate 2 gram / 50mL Infusion (GIVE X 3 BAGS TO EQUAL 6GM TOTAL DOSE) - Mg 1.1 - 1.5 mg/dl  2 g Intravenous PRN Jannet Lagunas MD        Or   • Magnesium Sulfate 4 gram infusion- Mg 1.6-1.9 mg/dL  4 g Intravenous PRN Jannet Lagunas MD       • melatonin tablet 10 mg  10 mg Oral Nightly Jannet Lagunas MD   10 mg at 12/11/22 2038   • metoprolol tartrate (LOPRESSOR) tablet 25 mg  25 mg Oral Q12H Dante Levine MD   25 mg at 12/12/22 0801   • multivitamin (THERAGRAN) tablet 1 tablet  1 tablet Oral Daily Jannet Lagunas MD   1 tablet at 12/12/22 0801   • ondansetron (ZOFRAN) injection 4 mg  4 mg Intravenous Q6H PRN Jannet Lagunas MD       • pantoprazole (PROTONIX) EC tablet 40 mg  40 mg Oral Q AM Jannet Lagunas MD   40 mg at 12/12/22 0514   • Pharmacy Consult   Does not apply Continuous PRN Mustapha Loredo DO       • phenol (CHLORASEPTIC) 1.4 % liquid 1 spray  1 spray Mouth/Throat Q2H PRN Jannet Lagunas MD   1 spray at 10/13/22 2056   • polyethylene glycol (MIRALAX) packet 17 g  17 g Oral BID PRN Jannet Lagunas MD       • potassium chloride (K-DUR,KLOR-CON) CR tablet 40 mEq  40 mEq Oral PRN Jannet Lagunas MD        Or   • potassium chloride (KLOR-CON) packet 40 mEq  40 mEq Oral PRN Jannet Lagunas MD        Or   • potassium chloride 10 mEq in 100 mL IVPB  10 mEq Intravenous Q1H PRN Jannet Lagunas MD       • prochlorperazine (COMPAZINE) injection 5 mg  5 mg  Intravenous Q6H PRN Jannet Lagunas MD        Or   • prochlorperazine (COMPAZINE) tablet 5 mg  5 mg Oral Q6H PRN Jannet Lagunas MD        Or   • prochlorperazine (COMPAZINE) suppository 25 mg  25 mg Rectal Q12H PRN Jannet Lagunas MD       • promethazine (PHENERGAN) 6.25 mg in sodium chloride 0.9 % 50 mL  6.25 mg Intravenous Q6H PRN Jannet Lagunas MD       • sodium chloride 0.9 % flush 10 mL  10 mL Intravenous PRN Jannet Lagunas MD       • sodium chloride 0.9 % flush 10 mL  10 mL Intravenous Q12H Jannet Lagunas MD   10 mL at 12/12/22 0804   • sodium chloride 0.9 % flush 10 mL  10 mL Intravenous PRN Jannet Lagunas MD       • sodium chloride 0.9 % flush 10 mL  10 mL Intravenous Q12H Jannet Lagunas MD   10 mL at 12/12/22 0804   • sodium chloride 0.9 % flush 10 mL  10 mL Intravenous PRN Jannet Lagunas MD       • tamsulosin (FLOMAX) 24 hr capsule 0.4 mg  0.4 mg Oral Daily Jannet Lagunas MD   0.4 mg at 12/12/22 0801     Orders (active)      Start     Ordered    12/09/22 0439  DIET MESSAGE Please send pt a piece of cake for her Birthday today. Thanks  Once        Comments: Please send pt a piece of cake for her Birthday today. Thanks    12/09/22 0439    12/06/22 1509  Diet: Texture: Regular Texture (IDDSI 7); Fluid Consistency: Thin (IDDSI 0); Regular/House Diet  Diet Effective Now         12/06/22 1508    12/04/22 1800  Diclofenac Sodium (VOLTAREN) 1 % gel 4 g  4 Times Daily         12/04/22 1516    11/26/22 0853  acetaminophen (TYLENOL) tablet 650 mg  Every 6 Hours PRN         11/26/22 0853    11/15/22 2100  metoprolol tartrate (LOPRESSOR) tablet 25 mg  Every 12 Hours Scheduled         11/15/22 1101    11/15/22 1200  DIET MESSAGE Dieatry supplement- magic cup  Daily With Lunch & Dinner      Comments: Dieatry supplement- magic cup    11/15/22 0257    11/15/22 0900  potassium chloride (K-DUR,KLOR-CON) CR tablet 40 mEq  Every 4 Hours         11/15/22 0747    11/14/22 1509  hydrALAZINE  (APRESOLINE) injection 10 mg  Every 6 Hours PRN         11/14/22 1509    11/13/22 0500  potassium chloride (K-DUR,KLOR-CON) CR tablet 40 mEq  Every 4 Hours         11/13/22 0244    11/08/22 0600  potassium chloride (K-DUR,KLOR-CON) CR tablet 40 mEq  Every 4 Hours         11/08/22 0512    11/05/22 1531  guaiFENesin-dextromethorphan (ROBITUSSIN DM) 100-10 MG/5ML syrup 5 mL  Every 4 Hours PRN         11/05/22 1531    11/02/22 1716  benzocaine-menthol (DERMOPLAST) 20-0.5 % topical spray  4 Times Daily PRN         11/02/22 1716    11/01/22 1615  docusate sodium (COLACE) capsule 100 mg  2 Times Daily PRN         11/01/22 1604    10/27/22 2000  apixaban (ELIQUIS) tablet 5 mg  Every 12 Hours Scheduled         10/27/22 1034    10/24/22 1359  Pharmacy Consult  Continuous PRN         10/24/22 1400    10/23/22 2200  hydrALAZINE (APRESOLINE) tablet 5 mg  Every 8 Hours Scheduled         10/23/22 1419    10/21/22 1745  multivitamin (THERAGRAN) tablet 1 tablet  Daily         10/21/22 1658    10/21/22 1658  Patient Currently On Electrolyte Replacement Protocol - Please Refer to MAR for Protocol Details  Misc Nursing Order (Specify)  Daily      Comments: Patient Currently On Electrolyte Replacement Protocol - Please Refer to MAR for Protocol Details    10/21/22 1657    10/21/22 1657  Magnesium Sulfate 2 gram Bolus, followed by 8 gram infusion (total Mg dose 10 grams)- Mg less than or equal to 1mg/dL  As Needed        See Hyperspace for full Linked Orders Report.    10/21/22 1657    10/21/22 1657  Magnesium Sulfate 2 gram / 50mL Infusion (GIVE X 3 BAGS TO EQUAL 6GM TOTAL DOSE) - Mg 1.1 - 1.5 mg/dl  As Needed        See Hyperspace for full Linked Orders Report.    10/21/22 1657    10/21/22 1657  Magnesium Sulfate 4 gram infusion- Mg 1.6-1.9 mg/dL  As Needed        See Hyperspace for full Linked Orders Report.    10/21/22 1657    10/21/22 1657  potassium chloride (K-DUR,KLOR-CON) CR tablet 40 mEq  As Needed        See Hyperspace for  full Linked Orders Report.    10/21/22 1657    10/21/22 1657  potassium chloride (KLOR-CON) packet 40 mEq  As Needed        See Hyperspace for full Linked Orders Report.    10/21/22 1657    10/21/22 1657  potassium chloride 10 mEq in 100 mL IVPB  Every 1 Hour PRN        See Hyperspace for full Linked Orders Report.    10/21/22 1657    10/19/22 0737  Day 14 Swing Bed Certification  Once         10/19/22 0737    10/18/22 1115  tamsulosin (FLOMAX) 24 hr capsule 0.4 mg  Daily         10/18/22 1019    10/10/22 1426  Assess Need for Indwelling Urinary Catheter - Follow Removal Protocol  Continuous        Comments: Follow Protocol As Outlined in Process Instructions (Open Order Report to View Full Instructions)   See Hyperspace for full Linked Orders Report.    10/10/22 1426    10/10/22 1426  Urinary Catheter Care  Every Shift      See Hyperspace for full Linked Orders Report.    10/10/22 1426    10/09/22 1443  Orthostatic Blood Pressure  Once         10/09/22 1442    10/06/22 1511  DIET MESSAGE Please send soup beans and cornbread on supper tray please this evening.  Once        Comments: Please send soup beans and cornbread on supper tray please this evening.    10/06/22 1511    10/06/22 0900  escitalopram (LEXAPRO) tablet 10 mg  Daily         10/05/22 1252    10/06/22 0800  Vital Signs  Every Morning         10/05/22 1400    10/06/22 0600  pantoprazole (PROTONIX) EC tablet 40 mg  Every Early Morning         10/05/22 1252    10/06/22 0600  Daily Weights  Daily       10/05/22 1252    10/05/22 2300  Vital Signs Every Hour and Per Hospital Policy Based on Patient Condition  Every Shift       10/05/22 1252    10/05/22 2300  Intake and Output  Every Shift       10/05/22 1252    10/05/22 2300  Strict Intake & Output  Every Shift       10/05/22 1252    10/05/22 2100  sodium chloride 0.9 % flush 10 mL  Every 12 Hours Scheduled         10/05/22 1252    10/05/22 2100  gabapentin (NEURONTIN) capsule 300 mg  Nightly         10/05/22  1252    10/05/22 2100  sodium chloride 0.9 % flush 10 mL  Every 12 Hours Scheduled         10/05/22 1252    10/05/22 2100  melatonin tablet 10 mg  Nightly         10/05/22 1252    10/05/22 2000  Neurovascular Checks  Every Shift       10/05/22 1253    10/05/22 1800  furosemide (LASIX) tablet 40 mg  2 Times Daily (Diuretics)         10/05/22 1252    10/05/22 1432  Oral care for patients not on NPPV or intubated  Every Shift      Comments: Oral care for patients not on NPPV or intubated    10/05/22 1252    10/05/22 1400  Incentive Spirometry  Every 4 Hours While Awake       10/05/22 1252    10/05/22 1359  Activity as Tolerated  Until Discontinued         10/05/22 1400    10/05/22 1358  Initial Swing Bed Certification  Once         10/05/22 1400    10/05/22 1358  Weigh Patient Upon Admission and Every Sunday  Weekly         10/05/22 1400    10/05/22 1358  Intake & Output  Every Shift       10/05/22 1400    10/05/22 1358  TB Skin Test (Reading)  Once        Comments: Use Enter / Edit Results to Document Results Upon Patient Return to Facility      10/05/22 1400    10/05/22 1358  OT Consult: Eval & Treat  Once         10/05/22 1400    10/05/22 1303  Up In Chair  Every Shift      Comments: Or at least up to edge of bed if possible    10/05/22 1252    10/05/22 1252  Saline Lock & Maintain IV Access  Continuous         10/05/22 1252    10/05/22 1252  If Patient has BG Less Than 80 & is Symptomatic But Not on IV Insulin Protocol - Use Adult Hypoglycemia Treatment Orders  Continuous         10/05/22 1252    10/05/22 1252  Hospitalist (on-call MD unless specified)  Once        Specialty:  Hospitalist  Provider:  Jannet Lagunas MD    10/05/22 1252    10/05/22 1252  Fall Precautions  Continuous         10/05/22 1252    10/05/22 1252  PT Consult: Eval & Treat As Tolerated; Post Surgery Care  Once         10/05/22 1252    10/05/22 1252  OT Consult: Eval & Treat  Once         10/05/22 1252    10/05/22 1252  MIDLINE CARE -  Change CHG Dressing or Transparent Dressing With CHG Disk and Securement Device Every 7 Days  Weekly         10/05/22 1252    10/05/22 1252  Maintain Sequential Compression Device  Continuous         10/05/22 1252    10/05/22 1252  OT Consult: Eval & Treat  Once        Comments: See Ortho notes    10/05/22 1252    10/05/22 1252  Notify Provider if Bladder Distention Continues  Until Discontinued         10/05/22 1252    10/05/22 1252  Consult Pharmacist For Review of Medications That May Cause Urinary Retention - RN To Place Order for Consult it Needed  Continuous         10/05/22 1252    10/05/22 1252  Code Status and Medical Interventions:  Continuous         10/05/22 1252    10/05/22 1251  sodium chloride 0.9 % flush 10 mL  As Needed         10/05/22 1251    10/05/22 1251  sodium chloride 0.9 % flush 10 mL  As Needed         10/05/22 1252    10/05/22 1251  HYDROcodone-acetaminophen (NORCO) 7.5-325 MG per tablet 1 tablet  Every 8 Hours PRN         10/05/22 1252    10/05/22 1251  phenol (CHLORASEPTIC) 1.4 % liquid 1 spray  Every 2 Hours PRN         10/05/22 1252    10/05/22 1251  promethazine (PHENERGAN) 6.25 mg in sodium chloride 0.9 % 50 mL  Every 6 Hours PRN         10/05/22 1252    10/05/22 1251  sodium chloride 0.9 % flush 10 mL  As Needed         10/05/22 1252    10/05/22 1251  polyethylene glycol (MIRALAX) packet 17 g  2 Times Daily PRN         10/05/22 1252    10/05/22 1251  prochlorperazine (COMPAZINE) injection 5 mg  Every 6 Hours PRN        See Hyperspace for full Linked Orders Report.    10/05/22 1252    10/05/22 1251  prochlorperazine (COMPAZINE) tablet 5 mg  Every 6 Hours PRN        See Hyperspace for full Linked Orders Report.    10/05/22 1252    10/05/22 1251  prochlorperazine (COMPAZINE) suppository 25 mg  Every 12 Hours PRN        See Hyperspace for full Linked Orders Report.    10/05/22 1252    10/05/22 1251  ondansetron (ZOFRAN) injection 4 mg  Every 6 Hours PRN         10/05/22 3081     Unscheduled  Bladder Scan if Patient Unable to Void 4-6 Hours After Catheter Removal  As Needed         10/05/22 1252    Unscheduled  If Bladder Scan Volume is Less Than 500mL & Patient is Without Symptoms of Bladder Discomfort / Distention Monitor Every 1-2 Hours for Spontaneous Void  As Needed       10/05/22 1252    Unscheduled  Straight Cath Every 4-6 Hours As Needed If Patient is Unable to Void After 4-6 Hours, Bladder Scan Volume is Greater Than 500mL & Patient Has Symptoms of Bladder Discomfort / Distention  As Needed       10/05/22 1252    Unscheduled  Schedule / Prompt Voiding For Patients With Urinary Incontinence  As Needed       10/05/22 1252    Unscheduled  Initiate & Follow Electrolyte Replacement Protocol  As Needed       10/21/22 1657    Unscheduled  Magnesium  As Needed       10/21/22 1657    Unscheduled  Potassium  As Needed       10/21/22 1657                   Physician Progress Notes (last 72 hours)      Mustapha Loredo DO at 12/11/22 1212        Pt admitted to swing bed on 10/5 for additional therapy in the setting of generalized weakness/debility. Pt not seen or examined today. I have reviewed her VS. I have discussed her care with SONAM Chase who does not report any acute events or issues that need to be addressed. BP stable today. Cont current antihypertensive regimen. Cont Eliquis for treatment of DVT. Cont PT/OT. Discharge appeal approved and swing bed coordinator submitted required clinical updates on 12/9 with decision pending. Cont supportive treatment.       Mustapha Loredo DO  12/11/22  15:52 EDT                Electronically signed by Mustapha Loredo DO at 12/11/22 1213     Mustapha Loredo DO at 12/10/22 1447        Pt admitted to swing bed on 10/5 for additional therapy in the setting of generalized weakness/debility. Pt not seen or examined today. I have reviewed her VS. I have discussed her care with SONAM Tejeda who does not report any acute events or  issues that need to be addressed. RN states pt has reported improvement in her leg pain today. BP stable today. Cont current antihypertensive regimen. Cont Eliquis for treatment of DVT. Cont PT/OT. Discharge appeal approved and swing bed coordinator submitted required clinical updates on 12/9 with decision pending.       Mustapha Loredo DO  12/10/22  15:52 EDT                Electronically signed by Mustapha Loredo DO at 12/10/22 1449     Mustapha Loredo DO at 12/09/22 1354                  [facility] HOSPITALIST PROGRESS NOTE    Subjective     History:   Tre Kinney is a 89 y.o. female admitted on 10/5/2022 secondary to <principal problem not specified>     Procedures: None    CC: Follow up debility    Reason for follow up swing bed visit today: Follow up right leg pain    Patient seen and examined with SONAM Sims. Awake and alert. Sitting in bedside chair with her son present at bedside. Reports some right right knee and leg pain but appears improved from yesterday. No reported CP or dyspnea. No reported nausea or vomiting. No acute events overnight per RN.     History taken from: patient, chart, and RN.      Objective     Vital Signs  Temp:  [97.3 °F (36.3 °C)-97.9 °F (36.6 °C)] 97.3 °F (36.3 °C)  Heart Rate:  [80-91] 90  Resp:  [18] 18  BP: (114-134)/(50-66) 115/66    Intake/Output Summary (Last 24 hours) at 12/9/2022 1355  Last data filed at 12/9/2022 1200  Gross per 24 hour   Intake 1560 ml   Output 850 ml   Net 710 ml         Physical Exam: Unchanged from previous.   General:    Awake, alert, in no acute distress, chronically ill appearing   Heart:      Normal S1 and S2. Regular rate and rhythm. No significant murmur, rubs or gallops appreciated.   Lungs:     Respirations regular, even and unlabored. Lungs clear to auscultation B/L. No wheezes, rales or rhonchi.   Abdomen:   Soft and nontender. No guarding, rebound tenderness or  organomegaly noted. Bowel sounds present x  4.   Extremities:  Trace bilateral pedal edema with mild edema RLE. Moves UE and LE equally B/L with generalized weakness.     Results Review:    Results from last 7 days   Lab Units 12/06/22  0220   WBC 10*3/mm3 6.80   HEMOGLOBIN g/dL 9.5*   PLATELETS 10*3/mm3 200     Results from last 7 days   Lab Units 12/06/22  0220   SODIUM mmol/L 139   POTASSIUM mmol/L 3.8   CHLORIDE mmol/L 102   CO2 mmol/L 26.1   BUN mg/dL 33*   CREATININE mg/dL 1.92*   CALCIUM mg/dL 8.8   GLUCOSE mg/dL 104*                       Imaging Results (Last 24 Hours)     Procedure Component Value Units Date/Time    US Venous Doppler Lower Extremity Bilateral (duplex) [[id number]] Collected: 12/08/22 1511     Updated: 12/08/22 1515    Narrative:      EXAM:    US Duplex Bilateral Lower Extremities Veins     EXAM DATE:    12/8/2022 2:23 PM     CLINICAL HISTORY:    Pain, swelling     TECHNIQUE:    Real-time duplex ultrasound scan of the bilateral lower extremity  veins integrating B-mode two-dimensional vascular structure, Doppler  spectral analysis, color flow Doppler imaging and compression.     COMPARISON:    No relevant prior studies available.     FINDINGS:    RIGHT DEEP VEINS:  Unremarkable.  No DVT in the right common femoral,  femoral, proximal deep femoral or popliteal veins.  The veins  demonstrate normal color flow, are normally compressible, with normal  phasic flow and/or augmentation response.       LEFT DEEP VEINS:  No compression of the left superficial femoral vein  mid to distal positive for deep venous thrombosis.       SOFT TISSUES:  No acute findings.  No popliteal cyst.       Impression:        No compression of the left superficial femoral vein mid to distal  positive for deep venous thrombosis.     This report was finalized on 12/8/2022 3:12 PM by Dr. Ion Del Rosario MD.               Medications:  apixaban, 5 mg, Oral, Q12H  Diclofenac Sodium, 4 g, Topical, 4x Daily  escitalopram, 10 mg, Oral, Daily  furosemide, 40 mg, Oral,  BID  gabapentin, 300 mg, Oral, Nightly  hydrALAZINE, 5 mg, Oral, Q8H  melatonin, 10 mg, Oral, Nightly  metoprolol tartrate, 25 mg, Oral, Q12H  multivitamin, 1 tablet, Oral, Daily  pantoprazole, 40 mg, Oral, Q AM  sodium chloride, 10 mL, Intravenous, Q12H  sodium chloride, 10 mL, Intravenous, Q12H  tamsulosin, 0.4 mg, Oral, Daily      Pharmacy Consult,             Assessment & Plan   Generalized weakness/debility  Diffuse LLE DVT  Right leg pain  DIANA on CKD IV  Acute blood loss anemia  Traumatic right gluteal hematoma  Mild hypokalemia   Paroxysmal Afib  Hx of SSS s/p pacemaker placement  Essential HTN  Reported hx of DVT's   Anxiety/depression  Advanced age    Plan  Venous duplex US with no evidence of DVT in RLE. X-rays reveal degenerative changes consistent with OA. Discussed results of imaging with patient and her son at bedside. Pain appears improved today. Cont current medication regimen including Voltaren gel. BP stable and controlled today on current regimen. Tolerating Eliquis with no reported signs of bleeding. Discussed disposition with swing bed coordinator who states appeal was approved but clinical updates required which have been submitted today. Cont PT/OT as tolerated.     Disposition: Clinical updates submitted by swing bed coordinator today for additional swing bed days.             Mustapha Loredo DO  12/09/22  13:55 EST    Electronically signed by Mustapha Loredo DO at 12/09/22 1423          Consult Notes (all)      Andrew Chapman MD at 11/03/22 1249      Consult Orders    1. Inpatient Psychiatrist Consult [[id number]] ordered by Kennedi Saucedo MD at 11/03/22 0744    2. Inpatient Psychiatrist Consult [[id number]] ordered by Kennedi Saucedo MD at 10/31/22 1147                   Referring Provider: Lewis  Reason for Consultation: Capacity      Chief complaint/Focus of Exam: Capacity Evaluation     Subjective .     History of present illness: Patient is an 89-year-old  female with a history significant for CHF, CKD, CAD, history of clots, chronic anticoagulation, sick sinus syndrome with pacer placement, chronic diastolic CHF, anemia, and depression who originally admitted to the hospital after a fall.  Psychiatry was originally consulted early in her hospitalization 1 month ago for depressive symptoms and for medication management in light of hyponatremia.  I evaluated patient at that time.  Psychiatry was reconsulted to evaluate patient for medical decision-making capacity as there have been some concerns about treatment care planning going forward and family has been involved.  I evaluated patient at bedside today while she was eating lunch.  She was sitting up and reported that she was having some neck pain but denied any other complaints.  She was alert and oriented x4.  She is able to answer all my questions appropriately.  She was not experiencing any confusion, hallucinations, delusions, or paranoia.  She denied SI/HI/AVH.  She was making jokes about being in the hospital and seemed to be in good spirits despite her neck pain.    Review of Systems  Pertinent items are noted in HPI    History  Mental Status Exam:   Mental Status Exam:    Hygiene:   good  Cooperation:  Cooperative  Eye Contact:  Good  Psychomotor Behavior:  Slow  Affect:  Full range  Hopelessness: Optimistic  Speech:  Normal  Thought Progress:  Goal directed and Linear  Thought Content:  Normal and Mood congruent  Suicidal:  None  Homicidal:  None  Hallucinations:  None  Delusion:  None  Memory:  Intact  Orientation:  Person, Place, Time and Situation  Reliability:  good  Insight:  Fair  Judgement:  Fair  Impulse Control:  Fair    Results Review:   I reviewed the patient's new clinical results.  Lab Results (last 24 hours)     ** No results found for the last 24 hours. **        Imaging Results (Last 24 Hours)     ** No results found for the last 24 hours. **            Assessment & Plan     Evaluation for  medical decision making capacity  -At this point in time, patient does have capacity for medical decision making.  She is alert and oriented and able to answer questions appropriately and I do not have any concerns about her making her own treatment plan and decisions going forward.    I discussed the patients findings and my recommendations with patient    Andrew Chapman MD  11/03/22  12:49 EDT        Electronically signed by Andrew Chapman MD at 11/03/22 1253     Liyah Jaquez APRN at 10/10/22 1145      Consult Orders    1. Inpatient Palliative Care MD Consult [[id number]] ordered by Mustapha Loredo DO at 10/10/22 0919               Palliative Care Initial Consult     Attending Physician: Mustapha Loredo, *  Referring Provider: Mustapha Loredo    Palliative care reason for consult:GOC/ACP support for pt and family.  Code Status:   Code Status and Medical Interventions:   Ordered at: 10/05/22 1253     Medical Intervention Limits:    NO intubation (DNI)     Level Of Support Discussed With:    Patient    Next of Kin (If No Surrogate)     Code Status (Patient has no pulse and is not breathing):    No CPR (Do Not Attempt to Resuscitate)     Medical Interventions (Patient has pulse or is breathing):    Limited Support     Comments:    Patient states that she does not want to be resuscitated or put on a ventilator, her son Leno supports her decision      Advanced Directives: Advance Directive Status: Patient does not have advance directive   Healthcare surrogate: YUKIK three adult sons, Valdez, Leno and Willian.  Goals of Care: Tre confirmed that she does not want to be resuscitated or to be put on ventilator, and would like to return home with son Valdez when she is stronger, she is currently swing bed/working with PT.    HPI:  Tre Kinney is a 89 y.o. female admitted on 10/5/2022 to swing bed for physical therapy for ten days. She was originally admitted to the hospital on 9/19/2022 due to a  "fall and symptomatic anemia. She has a medical history CHF, chronic kidney disease, coronary artery disease s/p stenting, history of blood clots, chronic anticoagulation with Coumadin, SSS s/p pacemaker placement, chronic diastolic CHF, and iron deficiency anemia who presents today with a chief complaint of a fall. Per pt she has become progressively more weak and has experienced several falls. Pt has sustained a moderate acute superior endplate vertebral compression fracture at the L1 with about 40% loss of vertebral body height and mild posterior osseous retropulsion of about 6 mm and also noted to have chronic compression fracture deformities involving the inferior endplates of L3 and L4 due to her first fall and from her second fall she does have a large hematoma on her right side, which led her to this admission. Pt had a hemoglobin of 6.1 for which she received 2 units of PRBC's. Pt denies any known source of bleeding and does say she had a scope(EGD colonoscopy at [facility] in 2018.        ROS: Negative except as above in HPI.       Palliative Performance Scale Score:     /78 (BP Location: Left arm, Patient Position: Lying)   Pulse 77   Temp 98.1 °F (36.7 °C) (Oral)   Resp 16   Ht 170.2 cm (67.01\")   Wt 71.8 kg (158 lb 3.2 oz)   SpO2 95%   BMI 24.77 kg/m²     Intake/Output Summary (Last 24 hours) at 10/10/2022 1405  Last data filed at 10/10/2022 0410  Gross per 24 hour   Intake 360 ml   Output 1200 ml   Net -840 ml       PE:  General Appearance:    Pt appears weak, alert, cooperative, NAD   HEENT:    NC/AT, without obvious abnormality, EOMI, anicteric, nasal congestion present   Neck:   supple, trachea midline, no JVD   Lungs:     Regular even respirations, CTA, on room air, no distress    Heart:    RRR, normal S1 and S2, no M/R/G   Abdomen:     Soft, NT, ND, NABS    Extremities:   Moves all extremities, no edema   Pulses:   Pulses palpable and equal bilaterally   Skin:   Warm, dry   Neurologic:   " A/Ox3, cooperative, weakness   Psych:   Calm, appropriate       Labs:   Results from last 7 days   Lab Units 10/10/22  0246   WBC 10*3/mm3 6.70   HEMOGLOBIN g/dL 10.3*   HEMATOCRIT % 31.6*   PLATELETS 10*3/mm3 201     Results from last 7 days   Lab Units 10/10/22  0246   SODIUM mmol/L 138   POTASSIUM mmol/L 3.3*   CHLORIDE mmol/L 99   CO2 mmol/L 25.5   BUN mg/dL 39*   CREATININE mg/dL 2.40*   GLUCOSE mg/dL 116*   CALCIUM mg/dL 9.0     Results from last 7 days   Lab Units 10/10/22  0246 10/07/22  0047 10/05/22  0311   SODIUM mmol/L 138   < > 137   POTASSIUM mmol/L 3.3*   < > 3.7   CHLORIDE mmol/L 99   < > 98   CO2 mmol/L 25.5   < > 24.1   BUN mg/dL 39*   < > 21   CREATININE mg/dL 2.40*   < > 1.90*   CALCIUM mg/dL 9.0   < > 9.3   BILIRUBIN mg/dL  --   --  0.8   ALK PHOS U/L  --   --  66   ALT (SGPT) U/L  --   --  6   AST (SGOT) U/L  --   --  11   GLUCOSE mg/dL 116*   < > 101*    < > = values in this interval not displayed.     Imaging Results (Last 72 Hours)     ** No results found for the last 72 hours. **          Diagnostics: Reviewed    A: Tre Kinney is a 89 y.o. female admitted on 10/5/2022 to [facility] bed for physical therapy for ten days. She was originally admitted to the hospital on 9/19/2022 due to a fall and symptomatic anemia. She has a medical history CHF, chronic kidney disease, coronary artery disease s/p stenting, history of blood clots, chronic anticoagulation with Coumadin, SSS s/p pacemaker placement, chronic diastolic CHF, and iron deficiency anemia who presents today with a chief complaint of a fall. Per pt she has become progressively more weak and has experienced several falls. Pt has sustained a moderate acute superior endplate vertebral compression fracture at the L1 with about 40% loss of vertebral body height and mild posterior osseous retropulsion of about 6 mm and also noted to have chronic compression fracture deformities involving the inferior endplates of L3 and L4 due to her first fall  and from her second fall she does have a large hematoma on her right side, which led her to this admission. Pt had a hemoglobin of 6.1 for which she received 2 units of PRBC's. Pt denies any known source of bleeding and does say she had a scope(EGD colonoscopy at [facility] in 2018.    Today 10/10/2022  Pt was awake alert and oriented, she was afebrile, on room air saturating 95%, HR 77, RR 16 and BP of 153/78. Tre denied any pain, nausea, anxiety, or shortness of breath at this time.           P:   Palliative was consulted to discuss GOC/ACP support for pt and family. Palliative was able to speak with patients son Leno today to let him know that we were re-consulted. We let him know that Tre was working with PT when we entered the room at lunch time, and that we helped set up her lunch and she was eating at her potato, and that Tre told us to tell him that she was trying. Leno was under the impression that depending on how well Tre works with Therapy that she may be able to get an extension of swing bed days. I have reached out to Kelly in  to check and see what Tre's options would be, whether it be an extension on swing bed days or would she qualify for rehab placement. I will continue to follow up with patient and family.    We appreciate the consult and the opportunity to participate in Tre Kinney's care. We will continue to follow along. Please do not hesitate to contact us regarding further symptom management or goals of care needs, including after hours or on weekends via our on call provider at [phone].     Time: 30 minutes spent reviewing medical and medication records, assessing and examining patient, discussing with family, answering questions, providing some guidance about a plan and documentation of care, and coordinating care with other healthcare members, with > 50% time spent face to face.     LANCE Cantu    10/10/2022      Electronically signed by Liyah Jaquez APRN at  10/10/22 1430     Andrew Chapman MD at 10/06/22 1249      Consult Orders    1. Inpatient Psychiatrist Consult [[id number]] ordered by Jannet Lagunas MD at 10/05/22 1054                   Referring Provider: Janneth  Reason for Consultation: Depression      Chief complaint/Focus of Exam: Depression    Subjective .     History of present illness: Patient is an 89-year-old female with a history significant for CHF, CKD, CAD, history of clots, chronic anticoagulation, sick sinus syndrome with pacer placement, chronic diastolic CHF, anemia, and depression who originally admitted to the hospital after a fall.  Psychiatry was consulted after patient has shown significant depressive symptoms and has not been overly cooperative or ambitious with physical therapy or treatment plans.  I evaluated patient at bedside today.  She reports a long history of depression that she has had managed by her primary care provider.  She reports that she has been on Lexapro for several years and it initially worked well.  She reports significant depression and dysphoria since her fall and is anxious about her health in general.  She denies SI/HI/AVH.  She reports that she has never seen a psychiatrist or therapist in the past and she has never been inpatient for mental health reasons.    Review of Systems  Pertinent items are noted in HPI    History  Mental Status Exam:   Mental Status Exam:    Hygiene:   good  Cooperation:  Cooperative  Eye Contact:  Good  Psychomotor Behavior:  Slow  Affect:  Restricted  Hopelessness: 7  Speech:  Normal and low volume  Thought Progress:  Goal directed and Linear  Thought Content:  Normal and Mood congruent  Suicidal:  None  Homicidal:  None  Hallucinations:  None  Delusion:  None  Memory:  Intact  Orientation:  Person, Place, Time and Situation  Reliability:  good  Insight:  Fair  Judgement:  Fair  Impulse Control:  Fair    Results Review:   I reviewed the patient's new clinical results.  Lab Results  (last 24 hours)     ** No results found for the last 24 hours. **        Imaging Results (Last 24 Hours)     ** No results found for the last 24 hours. 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  Her Lexapro does not seem to be effective and Lexapro and other antidepressants have been shown to contribute or cause urinary retention.  Patient has been on Lexapro for several years and has not had this issue previously but with age another medical comorbidities, this could have changed over time.  I also do not feel that it is effective at the current moment in dealing with her depressive symptoms.  -Recommend discontinuing Lexapro and starting Prozac as it is less likely to cause urinary retention and and has led to abatement and urinary retention caused by other SSRIs via multiple case studies and clinical evaluations.  Recommend starting Prozac 20 mg p.o. daily, can discontinue Lexapro and switch directly to Prozac.    I discussed the patients findings and my recommendations with patient    Andrew Chapman MD  10/06/22  12:49 EDT          Electronically signed by Andrew Chapman MD at 10/06/22 1255          Nutrition Notes (most recent note)      Magi Oneal RD at 11/29/22 0822        Nutrition Services    Patient Name:  Tre Kinney  YOB: 1932  MRN: [id number]  Admit Date:  10/5/2022    BMI: 26.37  Diet: Soft to chew, chopped meats, regular  Supplement: Magic cup BID  Intake: 65% over 12 meals  Fluid intake: 1200ml not meeting needs    Electronically signed by:  Magi Oenal RD  11/29/22 08:22 EST     Electronically signed by Magi Oneal RD at 11/29/22 0823          Physical Therapy Notes (last 72 hours)      Bhargavi Vásquez, PT at 12/09/22 1310  Version 1 of 1         Acute Care - Physical Therapy Treatment Note  C[facility]     Patient Name: Tre  Gregoria  : 1932  MRN: [id number]  Today's Date: 2022   Onset of Illness/Injury or Date of Surgery: 10/06/22  Visit Dx:       PT Assessment (last 12 hours)     PT Evaluation and Treatment     Row Name 22 1315          Physical Therapy Time and Intention    Subjective Information complains of;pain  knee pain  -CT     Document Type therapy note (daily note)  -CT     Mode of Treatment physical therapy  -CT     Patient Effort good  -CT     Comment Pts treatment session 2812-2913. Ambulated x 1 with increased pain in knees reported. Pt then performed seated ther ex sitting in chair.  -CT     Row Name 22 1315          General Information    Patient Profile Reviewed yes  -CT     Limitations/Impairments safety/cognitive  -CT     Row Name 22 1315          Cognition    Affect/Mental Status (Cognition) WFL  -CT     Row Name 22 1315          Transfers    Transfers sit-stand transfer;stand-sit transfer  -CT     Row Name 22 1315          Sit-Stand Transfer    Sit-Stand [place] (Transfers) moderate assist (50% patient effort);2 person assist  -CT     Assistive Device (Sit-Stand Transfers) walker, front-wheeled  -CT     Row Name 22 1315          Stand-Sit Transfer    Stand-Sit [place] (Transfers) moderate assist (50% patient effort);verbal cues  verbal cueing to control sitting  -CT     Assistive Device (Stand-Sit Transfers) walker, front-wheeled  -CT     Row Name 22 1315          Gait/Stairs (Locomotion)    Gait/Stairs Locomotion gait/ambulation assistive device  -CT     [place] Level (Gait) moderate assist (50% patient effort);2 person assist;verbal cues;nonverbal cues (demo/gesture)  -CT     Assistive Device (Gait) walker, front-wheeled  -CT     Distance in Feet (Gait) 5  -CT     Pattern (Gait) step-to  -CT     Deviations/Abnormal Patterns (Gait) stride length decreased;gait speed decreased;festinating/shuffling;weight shifting decreased  -CT     Bilateral  Gait Deviations weight shift ability decreased;forward flexed posture;knee buckling bilaterally  -CT     Row Name 12/09/22 1315          Motor Skills    Therapeutic Exercise --  BLE seated ther ex: AP, LAQ, seated marches, glut sets, hip abd/add 25-30 reps  -CT     Row Name 12/09/22 1315          Coping    Observed Emotional State pleasant;cooperative  -CT     Verbalized Emotional State acceptance  -CT     Family/Support Persons family  -CT     Row Name 12/09/22 1315          Positioning and Restraints    Pre-Treatment Position sitting in chair/recliner  -CT     Post Treatment Position chair  -CT     In Chair sitting;call light within reach;encouraged to call for assist;notified nsg  -CT           User Key  (r) = Recorded By, (t) = Taken By, (c) = Cosigned By    Initials Name Provider Type    CT Bhargavi Vásquez PT Physical Therapist                Physical Therapy Education     Title: PT OT SLP Therapies (Done)     Topic: Physical Therapy (Done)     Point: Mobility training (Done)     Learning Progress Summary           Patient Acceptance, E,TB, VU by RW at 12/9/2022 1451    Acceptance, E,TB, VU by BC at 11/27/2022 1319    Acceptance, E,TB, VU by RM at 11/20/2022 1449    Acceptance, E,TB, VU by RM at 11/18/2022 1021    Acceptance, E,TB, VU by RH at 11/16/2022 1110    Acceptance, E,TB, VU by KK at 11/14/2022 1638    Acceptance, E,TB, VU by KK at 11/13/2022 1648    Acceptance, E,TB, VU by KG at 11/10/2022 1827    Acceptance, E,TB, VU by DM at 11/9/2022 1638    Acceptance, E,TB, VU by KK at 11/8/2022 1754    Acceptance, E,TB, VU by KK at 11/7/2022 1635    Acceptance, E,D, VU,NR by LL at 11/5/2022 1325    Acceptance, E, VU by EB at 11/4/2022 1448    Acceptance, E,TB, VU by KM at 11/4/2022 1101                   Point: Home exercise program (Done)     Learning Progress Summary           Patient Acceptance, E,TB, VU by RW at 12/9/2022 1451    Acceptance, E,TB, VU by BC at 11/27/2022 1319    Acceptance, E,TB, VU by  at  11/20/2022 1449    Acceptance, E,TB, VU by RM at 11/18/2022 1021    Acceptance, E,TB, VU by RH at 11/16/2022 1110    Acceptance, E,TB, VU by KK at 11/14/2022 1638    Acceptance, E,TB, VU by KK at 11/13/2022 1648    Acceptance, E,TB, VU by KG at 11/10/2022 1827    Acceptance, E,TB, VU by DM at 11/9/2022 1638    Acceptance, E,TB, VU by KK at 11/8/2022 1754    Acceptance, E,TB, VU by KK at 11/7/2022 1635    Acceptance, E,D, VU,NR by LL at 11/5/2022 1325    Acceptance, E, VU by EB at 11/4/2022 1448    Acceptance, E,TB, VU by KM at 11/4/2022 1101                   Point: Body mechanics (Done)     Learning Progress Summary           Patient Acceptance, E,TB, VU by RW at 12/9/2022 1451    Acceptance, E,TB, VU by BC at 11/27/2022 1319    Acceptance, E,TB, VU by RM at 11/20/2022 1449    Acceptance, E,TB, VU by RM at 11/18/2022 1021    Acceptance, E,TB, VU by RH at 11/16/2022 1110    Acceptance, E,TB, VU by KK at 11/14/2022 1638    Acceptance, E,TB, VU by KK at 11/13/2022 1648    Acceptance, E,TB, VU by KG at 11/10/2022 1827    Acceptance, E,TB, VU by DM at 11/9/2022 1638    Acceptance, E,TB, VU by KK at 11/8/2022 1754    Acceptance, E,TB, VU by KK at 11/7/2022 1635    Acceptance, E,D, VU,NR by LL at 11/5/2022 1325    Acceptance, E, VU by EB at 11/4/2022 1448    Acceptance, E,TB, VU by KM at 11/4/2022 1101                   Point: Precautions (Done)     Learning Progress Summary           Patient Acceptance, E,TB, VU by RW at 12/9/2022 1451    Acceptance, E,TB, VU by BC at 11/27/2022 1319    Acceptance, E,TB, VU by RM at 11/20/2022 1449    Acceptance, E,TB, VU by RM at 11/18/2022 1021    Acceptance, E,TB, VU by RH at 11/16/2022 1110    Acceptance, E,TB, VU by KK at 11/14/2022 1638    Acceptance, E,TB, VU by KK at 11/13/2022 1648    Acceptance, E,TB, VU by KG at 11/10/2022 1827    Acceptance, E,TB, VU by DM at 11/9/2022 1638    Acceptance, E,TB, VU by KK at 11/8/2022 1754    Acceptance, E,TB, VU by KK at 11/7/2022 1635     Acceptance, E,D, VU,NR by LL at 11/5/2022 1325    Acceptance, E, VU by EB at 11/4/2022 1448    Acceptance, E,TB, VU by KM at 11/4/2022 1101                               User Key     Initials Effective Dates Name Provider Type Discipline    LL 05/02/16 -  Neda Garcia PTA Physical Therapist Assistant PT    RM 06/14/22 -  Bethany Krause RN Registered Nurse Nurse    COTY 06/16/21 -  Moniz, Deborah, RN Registered Nurse Nurse    BC 08/02/22 -  Collett, Brittany, RN Registered Nurse Nurse    KM 05/24/22 -  Mao Vázquez PT Physical Therapist PT    EB 09/22/22 -  Cedric Oliver, RN Registered Nurse Nurse    KK 06/27/22 -  Chelsi Bolivar, RN Registered Nurse Nurse    KG 09/22/22 -  Verónica Rodriguez, RN Registered Nurse Nurse    RW 09/22/22 -  Bethany Scohfield, RN Registered Nurse Nurse    RH 08/03/22 -  Jung Russell RN Extern Registered Nurse Nurse              PT Recommendation and Plan  Anticipated Discharge Disposition (PT): home with 24/7 care, skilled nursing [facility]  Planned Therapy Interventions (PT): balance training, bed mobility training, gait training, home exercise program, manual therapy techniques, motor coordination training, neuromuscular re-education, patient/family education, postural re-education, strengthening, transfer training  Therapy Frequency (PT): 5 times/wk (5-6 times/wk)  Progress Summary (PT)  Progress Toward Functional Goals (PT): progress toward functional goals is gradual  Plan of Care Reviewed With: patient  Progress: improving   Outcome Measures     Row Name 12/08/22 1421 12/07/22 1506          How much help from another is currently needed...    Putting on and taking off regular lower body clothing? 2  -KR 2  -KP     Bathing (including washing, rinsing, and drying) 2  -KR 2  -KP     Toileting (which includes using toilet bed pan or urinal) 2  -KR 3  -KP     Putting on and taking off regular upper body clothing 3  -KR 3  -KP     Taking care of personal grooming (such as brushing  teeth) 4  -KR 4  -KP     Eating meals 4  -KR 4  -KP     AM-PAC 6 Clicks Score (OT) 17  -KR 18  -KP           User Key  (r) = Recorded By, (t) = Taken By, (c) = Cosigned By    Initials Name Provider Type    Andres Ayala, OT Occupational Therapist    Linn Richardson, OT Occupational Therapist                 Time Calculation:    PT Charges     Row Name 22 1535             Time Calculation    Start Time 1310  -CT      Stop Time 1350  -CT      Time Calculation (min) 40 min  -CT      PT Received On 22  -CT            User Key  (r) = Recorded By, (t) = Taken By, (c) = Cosigned By    Initials Name Provider Type    CT Bhargavi Vásquez, PT Physical Therapist              Therapy Charges for Today     Code Description Service Date Service Provider Modifiers Qty    11066742791 HC GAIT TRAINING EA 15 MIN 2022 Bhargavi Vásquez, PT GP 1    15346131270 HC PT THER PROC EA 15 MIN 2022 Bhargavi Vásquez, PT GP 1    94237655786 HC PT THERAPEUTIC ACT EA 15 MIN 2022 Bhargavi Vásquez, PT GP 1          PT G-Codes  Outcome Measure Options: AM-PAC 6 Clicks Daily Activity (OT)  AM-PAC 6 Clicks Score (PT): 12  AM-PAC 6 Clicks Score (OT): 17    Bhargavi Vásquez PT  2022      Electronically signed by Bhargavi Vásquez, PT at 22 1537     Bhargavi Vásquez PT at 12/10/22 0937  Version 1 of 1         [facility] Care - Physical Therapy Treatment Note  CODY Ellison     Patient Name: Tre Kinney  : 1932  MRN: [id number]  Today's Date: 12/10/2022   Onset of Illness/Injury or Date of Surgery: 10/06/22  Visit Dx:       PT Assessment (last 12 hours)     PT Evaluation and Treatment     Row Name 12/10/22 0850          Physical Therapy Time and Intention    Subjective Information complains of;weakness  -CT     Document Type therapy note (daily note)  -CT     Mode of Treatment physical therapy  -CT     Patient Effort good  -CT     Comment Pt treatment session [id number]. Transfers pt out of bed and into chair. Ambulation x  2 and then pt peformed seated ther ex.  -CT     Row Name 12/10/22 0855          General Information    Patient Profile Reviewed yes  -CT     Limitations/Impairments safety/cognitive  -CT     Row Name 12/10/22 0855          Cognition    Affect/Mental Status (Cognition) WFL  -CT     Row Name 12/10/22 0855          Transfers    Transfers sit-stand transfer;stand-sit transfer  -CT     Row Name 12/10/22 0855          Sit-Stand Transfer    Sit-Stand [place] (Transfers) moderate assist (50% patient effort);2 person assist  -CT     Assistive Device (Sit-Stand Transfers) walker, front-wheeled  -CT     Row Name 12/10/22 0855          Stand-Sit Transfer    Stand-Sit [place] (Transfers) moderate assist (50% patient effort);verbal cues  continue to provide verbal cueing for proper technique when sitting  -CT     Assistive Device (Stand-Sit Transfers) walker, front-wheeled  -CT     Row Name 12/10/22 0855          Gait/Stairs (Locomotion)    Gait/Stairs Locomotion gait/ambulation assistive device  -CT     [place] Level (Gait) moderate assist (50% patient effort);2 person assist;verbal cues;nonverbal cues (demo/gesture)  -CT     Assistive Device (Gait) walker, front-wheeled  -CT     Distance in Feet (Gait) 6, 3  -CT     Pattern (Gait) step-to  -CT     Deviations/Abnormal Patterns (Gait) stride length decreased;gait speed decreased;festinating/shuffling;weight shifting decreased  -CT     Bilateral Gait Deviations weight shift ability decreased;forward flexed posture;knee buckling bilaterally  -CT     Row Name 12/10/22 0855          Motor Skills    Therapeutic Exercise --  BLE seated ther ex: AP, LAQ, seated marches, glut sets, hip abd/add  -CT     Row Name 12/10/22 0855          Coping    Observed Emotional State pleasant;cooperative  -CT     Verbalized Emotional State acceptance  -CT     Family/Support Persons family  -CT     Row Name 12/10/22 0855          Plan of Care Review    Plan of Care Reviewed With patient   -CT     Row Name 12/10/22 0855          Positioning and Restraints    Pre-Treatment Position in bed  -CT     Post Treatment Position chair  -CT     In Chair sitting;call light within reach;encouraged to call for assist;notified nsg  -CT           User Key  (r) = Recorded By, (t) = Taken By, (c) = Cosigned By    Initials Name Provider Type    CT Bhargavi Vásquez PT Physical Therapist                Physical Therapy Education     Title: PT OT SLP Therapies (Done)     Topic: Physical Therapy (Done)     Point: Mobility training (Done)     Learning Progress Summary           Patient Acceptance, E,TB, VU by RW at 12/9/2022 1451    Acceptance, E,TB, VU by BC at 11/27/2022 1319    Acceptance, E,TB, VU by RM at 11/20/2022 1449    Acceptance, E,TB, VU by RM at 11/18/2022 1021    Acceptance, E,TB, VU by RH at 11/16/2022 1110    Acceptance, E,TB, VU by KK at 11/14/2022 1638    Acceptance, E,TB, VU by KK at 11/13/2022 1648    Acceptance, E,TB, VU by KG at 11/10/2022 1827    Acceptance, E,TB, VU by DM at 11/9/2022 1638    Acceptance, E,TB, VU by KK at 11/8/2022 1754    Acceptance, E,TB, VU by KK at 11/7/2022 1635    Acceptance, E,D, VU,NR by LL at 11/5/2022 1325    Acceptance, E, VU by EB at 11/4/2022 1448    Acceptance, E,TB, VU by KM at 11/4/2022 1101                   Point: Home exercise program (Done)     Learning Progress Summary           Patient Acceptance, E,TB, VU by RW at 12/9/2022 1451    Acceptance, E,TB, VU by BC at 11/27/2022 1319    Acceptance, E,TB, VU by RM at 11/20/2022 1449    Acceptance, E,TB, VU by RM at 11/18/2022 1021    Acceptance, E,TB, VU by RH at 11/16/2022 1110    Acceptance, E,TB, VU by KK at 11/14/2022 1638    Acceptance, E,TB, VU by KK at 11/13/2022 1648    Acceptance, E,TB, VU by KG at 11/10/2022 1827    Acceptance, E,TB, VU by DM at 11/9/2022 1638    Acceptance, E,TB, VU by KK at 11/8/2022 1754    Acceptance, E,TB, VU by KK at 11/7/2022 1635    Acceptance, E,D, VU,NR by LL at 11/5/2022 1325     Acceptance, E, VU by EB at 11/4/2022 1448    Acceptance, E,TB, VU by KM at 11/4/2022 1101                   Point: Body mechanics (Done)     Learning Progress Summary           Patient Acceptance, E,TB, VU by RW at 12/9/2022 1451    Acceptance, E,TB, VU by BC at 11/27/2022 1319    Acceptance, E,TB, VU by RM at 11/20/2022 1449    Acceptance, E,TB, VU by RM at 11/18/2022 1021    Acceptance, E,TB, VU by RH at 11/16/2022 1110    Acceptance, E,TB, VU by KK at 11/14/2022 1638    Acceptance, E,TB, VU by KK at 11/13/2022 1648    Acceptance, E,TB, VU by KG at 11/10/2022 1827    Acceptance, E,TB, VU by DM at 11/9/2022 1638    Acceptance, E,TB, VU by KK at 11/8/2022 1754    Acceptance, E,TB, VU by KK at 11/7/2022 1635    Acceptance, E,D, VU,NR by LL at 11/5/2022 1325    Acceptance, E, VU by EB at 11/4/2022 1448    Acceptance, E,TB, VU by KM at 11/4/2022 1101                   Point: Precautions (Done)     Learning Progress Summary           Patient Acceptance, E,TB, VU by RW at 12/9/2022 1451    Acceptance, E,TB, VU by BC at 11/27/2022 1319    Acceptance, E,TB, VU by RM at 11/20/2022 1449    Acceptance, E,TB, VU by RM at 11/18/2022 1021    Acceptance, E,TB, VU by RH at 11/16/2022 1110    Acceptance, E,TB, VU by KK at 11/14/2022 1638    Acceptance, E,TB, VU by KK at 11/13/2022 1648    Acceptance, E,TB, VU by KG at 11/10/2022 1827    Acceptance, E,TB, VU by DM at 11/9/2022 1638    Acceptance, E,TB, VU by KK at 11/8/2022 1754    Acceptance, E,TB, VU by KK at 11/7/2022 1635    Acceptance, E,D, VU,NR by LL at 11/5/2022 1325    Acceptance, E, VU by EB at 11/4/2022 1448    Acceptance, E,TB, VU by KM at 11/4/2022 1101                               User Key     Initials Effective Dates Name Provider Type Discipline     05/02/16 -  Neda Garcia PTA Physical Therapist Assistant PT    RM 06/14/22 -  Bethany Krause, RN Registered Nurse Nurse    DM 06/16/21 -  Moniz, Deborah, RN Registered Nurse Nurse    BC 08/02/22 -  Collett,  Lyn, RN Registered Nurse Nurse    KM 05/24/22 -  Mao Vázquez, PT Physical Therapist PT    EB 09/22/22 -  Cedric Oliver, RN Registered Nurse Nurse    KK 06/27/22 -  Chelsi Bolivar, SONAM Registered Nurse Nurse    KG 09/22/22 -  Verónica Rodriguez, RN Registered Nurse Nurse    RW 09/22/22 -  Bethany Schofield, RN Registered Nurse Nurse    RH 08/03/22 -  Jung Russell RN [facility] Registered Nurse Nurse              PT Recommendation and Plan  Anticipated Discharge Disposition (PT): home with 24/7 care, skilled nursing facility  Planned Therapy Interventions (PT): balance training, bed mobility training, gait training, home exercise program, manual therapy techniques, motor coordination training, neuromuscular re-education, patient/family education, postural re-education, strengthening, transfer training  Therapy Frequency (PT): 5 times/wk (5-6 times/wk)  Progress Summary (PT)  Progress Toward Functional Goals (PT): progress toward functional goals is gradual  Plan of Care Reviewed With: patient  Progress: improving   Outcome Measures     Row Name 12/09/22 1637 12/08/22 1421 12/07/22 1506       How much help from another is currently needed...    Putting on and taking off regular lower body clothing? 2  -KR 2  -KR 2  -KP    Bathing (including washing, rinsing, and drying) 2  -KR 2  -KR 2  -KP    Toileting (which includes using toilet bed pan or urinal) 3  -KR 2  -KR 3  -KP    Putting on and taking off regular upper body clothing 3  -KR 3  -KR 3  -KP    Taking care of personal grooming (such as brushing teeth) 3  -KR 4  -KR 4  -KP    Eating meals 4  -KR 4  -KR 4  -KP    AM-PAC 6 Clicks Score (OT) 17  -KR 17  -KR 18  -KP          User Key  (r) = Recorded By, (t) = Taken By, (c) = Cosigned By    Initials Name Provider Type    Andres Ayala, OT Occupational Therapist    Linn Richardson, OT Occupational Therapist                 Time Calculation:    PT Charges     Row Name 12/10/22 0961             Time Calculation     Start Time 855  -CT      Stop Time 925  -CT      Time Calculation (min) 30 min  -CT      PT Received On 12/10/22  -CT            User Key  (r) = Recorded By, (t) = Taken By, (c) = Cosigned By    Initials Name Provider Type    CT Bhargavi Vásquez, PT Physical Therapist              Therapy Charges for Today     Code Description Service Date Service Provider Modifiers Qty    67845757299 HC GAIT TRAINING EA 15 MIN 2022 Bhargavi Vásquez, PT GP 1    13153199293 HC PT THER PROC EA 15 MIN 2022 Bhargavi Vásquez, PT GP 1    67411830814 HC PT THERAPEUTIC ACT EA 15 MIN 2022 Bhargavi Vásquez, PT GP 1    21888231689 HC GAIT TRAINING EA 15 MIN 12/10/2022 Bhargavi Vásquez, PT GP 1    50533296149 HC PT THER PROC EA 15 MIN 12/10/2022 Bhargavi Vásquez, PT GP 1          PT G-Codes  Outcome Measure Options: AM-PAC 6 Clicks Daily Activity (OT)  AM-PAC 6 Clicks Score (PT): 12  AM-PAC 6 Clicks Score (OT): 17    Bhargavi Vásquez PT  12/10/2022      Electronically signed by Bhargavi Vásquez, PT at 12/10/22 0937          Occupational Therapy Notes (last 72 hours)      Katelynn Fernández, OT at 12/10/22 1235          Acute Care - Occupational Therapy Treatment Note  CODY Ellison     Patient Name: Tre Kinney  : 1932  MRN: [id number]  Today's Date: 12/10/2022  Onset of Illness/Injury or Date of Surgery: 10/06/22     Referring Physician: Janneth    Admit Date: 10/5/2022         OT ASSESSMENT FLOWSHEET (last 12 hours)     OT Evaluation and Treatment     Row Name 12/10/22 1231                   OT Time and Intention    Subjective Information no complaints  -HB        Document Type therapy note (daily note)  -HB        Mode of Treatment individual therapy;occupational therapy  -HB        Total Minutes, Occupational Therapy 24  -HB        Patient Effort good  -HB        Symptoms Noted During/After Treatment none  -HB           General Information    Patient Profile Reviewed yes  -HB        Patient/Family/Caregiver Comments/Observations  Patient agreeable to OT tx this date.  -HB        General Observations of Patient Patient tolerated OT well with no adverse reactions.  -HB           Pain Assessment    Pretreatment Pain Rating 0/10 - no pain  -HB        Posttreatment Pain Rating 0/10 - no pain  -HB           Cognition    Affect/Mental Status (Cognition) WFL  -HB        Follows Commands (Cognition) WFL  -HB           Grooming Assessment/Training    [place] Level (Grooming) grooming skills;wash face, hands;set up  -HB        Position (Grooming) supported sitting  -HB           Self-Feeding Assessment/Training    [place] Level (Feeding) feeding skills;set up  -HB        Position (Self-Feeding) supported sitting  -HB           Motor Skills    Motor Skills functional endurance  -HB        Motor Control/Coordination Interventions therapeutic exercise/ROM;gross motor coordination activities  -HB        Therapeutic Exercise shoulder;elbow/forearm;hand;wrist  -HB        Additional Documentation --  flex bar 1 set of 10 reps; wrist rolls; rest between tasks.; pt educated on HEP while in room with good understanding verbalzied.  -HB           Positioning and Restraints    Pre-Treatment Position sitting in chair/recliner  -HB        Post Treatment Position chair  -HB        In Bed encouraged to call for assist;call light within reach  -HB              User Key  (r) = Recorded By, (t) = Taken By, (c) = Cosigned By    Initials Name Effective Dates    HB Katelynn Fernández, OT 05/25/21 -                        OT Recommendation and Plan           Outcome Measures     Row Name 12/10/22 1200 12/09/22 1637 12/08/22 1421       How much help from another is currently needed...    Putting on and taking off regular lower body clothing? 2  -HB 2  -KR 2  -KR    Bathing (including washing, rinsing, and drying) 2  -HB 2  -KR 2  -KR    Toileting (which includes using toilet bed pan or urinal) 3  -HB 3  -KR 2  -KR    Putting on and taking off regular upper body clothing  3  -HB 3  -KR 3  -KR    Taking care of personal grooming (such as brushing teeth) 3  -HB 3  -KR 4  -KR    Eating meals 4  -HB 4  -KR 4  -KR    AM-PAC 6 Clicks Score (OT) 17  -HB 17  -KR 17  -KR    Row Name 22 1506             How much help from another is currently needed...    Putting on and taking off regular lower body clothing? 2  -KP      Bathing (including washing, rinsing, and drying) 2  -KP      Toileting (which includes using toilet bed pan or urinal) 3  -KP      Putting on and taking off regular upper body clothing 3  -KP      Taking care of personal grooming (such as brushing teeth) 4  -KP      Eating meals 4  -KP      AM-PAC 6 Clicks Score (OT) 18  -KP            User Key  (r) = Recorded By, (t) = Taken By, (c) = Cosigned By    Initials Name Provider Type    Andres Ayala, OT Occupational Therapist     Linn Quevedo, OT Occupational Therapist     Katelynn Fernández OT Occupational Therapist                Time Calculation:    Time Calculation- OT     Row Name 12/10/22 1232             Time Calculation- OT    OT Start Time 1156  -HB      OT Stop Time 1220  -HB      OT Time Calculation (min) 24 min  -HB      Total Timed Code Minutes- OT 24 minute(s)  -HB            User Key  (r) = Recorded By, (t) = Taken By, (c) = Cosigned By    Initials Name Provider Type    Katelynn Gan OT Occupational Therapist              Therapy Charges for Today     Code Description Service Date Service Provider Modifiers Qty    [id number]  OT THERAPEUTIC ACT EA 15 MIN 12/10/2022 Katelynn Fernández OT GO 2               Katelynn Fernádnez OT  12/10/2022    Electronically signed by Katelynn Fernández OT at 12/10/22 1235     Andres Uriarte OT at 22 1422          Acute Care - Occupational Therapy Treatment Note  CODY Ellison     Patient Name: Tre Kinney  : 1932  MRN: [id number]  Today's Date: 2022  Onset of Illness/Injury or Date of Surgery: 10/06/22     Referring Physician: Janneth    Admit Date: 10/5/2022          OT ASSESSMENT FLOWSHEET (last 12 hours)     OT Evaluation and Treatment     Row Name 12/09/22 1400                   OT Time and Intention    Document Type therapy note (daily note)  -KR        Mode of Treatment occupational therapy  -KR        Patient Effort good  -KR        Comment OT assisted PT in assessment/performance of mobility sit/stand/ambulation. Min assist sit/stand. Following mobility training, pt allowed to rest prior to beginning/completing BUE ther ex from seated in chair at bedside. Pt tolerated activity well and remains motivated to improve general fxl status.  -KR           Shoulder (Therapeutic Exercise)    Shoulder AROM (Therapeutic Exercise) bilateral;flexion;extension;aBduction;aDduction;sitting;10 repetitions;3 sets  -KR        Shoulder Strengthening (Therapeutic Exercise) bilateral;flexion;extension;aBduction;aDduction;resistance band;yellow  -KR           Elbow/Forearm (Therapeutic Exercise)    Elbow/Forearm AROM (Therapeutic Exercise) bilateral;flexion;extension;10 repetitions;3 sets  -KR        Elbow/Forearm Strengthening (Therapeutic Exercise) bilateral;flexion;extension;resistance band;yellow  -KR           Hand (Therapeutic Exercise)    Hand Strengthening (Therapeutic Exercise) bilateral; strengthening  -KR              User Key  (r) = Recorded By, (t) = Taken By, (c) = Cosigned By    Initials Name Effective Dates    KR Andres Uriarte, OT 06/16/21 -                        OT Recommendation and Plan     Progress Toward Functional Goals (OT): progress toward functional goals is gradual  Plan of Care Review  Plan of Care Reviewed With: patient, son  Progress: improving  Plan of Care Reviewed With: patient, son     Outcome Measures     Row Name 12/09/22 1637 12/08/22 1421 12/07/22 1506       How much help from another is currently needed...    Putting on and taking off regular lower body clothing? 2  -KR 2  -KR 2  -KP    Bathing (including washing, rinsing, and drying) 2  -KR 2   -KR 2  -KP    Toileting (which includes using toilet bed pan or urinal) 3  -KR 2  -KR 3  -KP    Putting on and taking off regular upper body clothing 3  -KR 3  -KR 3  -KP    Taking care of personal grooming (such as brushing teeth) 3  -KR 4  -KR 4  -KP    Eating meals 4  -KR 4  -KR 4  -KP    AM-PAC 6 Clicks Score (OT) 17  -KR 17  -KR 18  -KP          User Key  (r) = Recorded By, (t) = Taken By, (c) = Cosigned By    Initials Name Provider Type    Andres Ayala OT Occupational Therapist    Linn Richardson OT Occupational Therapist                Time Calculation:    Time Calculation- OT     Row Name 12/09/22 1638             Time Calculation- OT    Total Timed Code Minutes- OT 45 minute(s)  -KR            User Key  (r) = Recorded By, (t) = Taken By, (c) = Cosigned By    Initials Name Provider Type    Andres Ayala OT Occupational Therapist              Therapy Charges for Today     Code Description Service Date Service Provider Modifiers Qty    20622276899 HC OT THERAPEUTIC ACT EA 15 MIN 12/8/2022 Andres Uriarte OT GO 2    89402734055 HC OT THERAPEUTIC ACT EA 15 MIN 12/9/2022 Andres Uriarte OT GO 2    [id number]  OT SELF CARE/MGMT/TRAIN EA 15 MIN 12/9/2022 Andres Uriarte OT GO 1               Andres Uriarte OT  12/9/2022    Electronically signed by Andres Uriarte OT at 12/09/22 1639       ADL Documentation (most recent)    Flowsheet Row Most Recent Value   Transferring 3 - assistive equipment and person   Toileting 3 - assistive equipment and person   Bathing 2 - assistive person   Dressing 2 - assistive person   Eating 0 - independent   Communication 0 - understands/communicates without difficulty   Swallowing 0 - swallows foods/liquids without difficulty   Equipment Currently Used at Home wheelchair, commode, walker, rolling, oxygen        Discharge Planning Assessment  CODY Ellison     Patient Name: Tre Kinney                    MRN: [id number]  Today's Date: 12/9/2022                     Admit Date:  10/5/2022             Discharge Plan            Row Name 12/09/22 0951             Plan      Plan Pt was admitted to swing bed on 10/5/22. SS discussed pt with swing bed RN on this date and he states appeal was approved, however clinical updates had to be submitted today requesting additional swing bed days. Swing bed RN has submitted updated clinicals. SS to follow.                      Continued Care and Services - Admitted Since 10/5/2022

## 2022-12-12 NOTE — PLAN OF CARE
Goal Outcome Evaluation:           Progress: no change  Outcome Evaluation: Patient has been able to work with PT and OT. Patient has been up to chair today. Powerglide removed per order. Patient has no complaints or concerns at this time

## 2022-12-12 NOTE — CASE MANAGEMENT/SOCIAL WORK
Patients insurance issued notice of non medicare coverage. Last covered day 12/14/22 expected discharge 12/15/22. Reviewed with patient. [facility] forwarded to patients son Conor. Attempted to notify by phone voice mail left for return call.    11:07 Return call from Conor Kinney reviewed [facility]. Appeal will need to be started before 12:00pm 12/13/2022.

## 2022-12-12 NOTE — PLAN OF CARE
Goal Outcome Evaluation:  Plan of Care Reviewed With: patient        Progress: no change  Outcome Evaluation: CONTINUE PLAN OF CARE; MONITOR

## 2022-12-13 PROCEDURE — 97535 SELF CARE MNGMENT TRAINING: CPT

## 2022-12-13 PROCEDURE — 97530 THERAPEUTIC ACTIVITIES: CPT

## 2022-12-13 PROCEDURE — 97110 THERAPEUTIC EXERCISES: CPT

## 2022-12-13 PROCEDURE — 97116 GAIT TRAINING THERAPY: CPT

## 2022-12-13 RX ORDER — DOCUSATE SODIUM 100 MG/1
100 CAPSULE, LIQUID FILLED ORAL 2 TIMES DAILY
Status: DISCONTINUED | OUTPATIENT
Start: 2022-12-13 | End: 2023-01-13 | Stop reason: HOSPADM

## 2022-12-13 RX ORDER — MAGNESIUM CARB/ALUMINUM HYDROX 105-160MG
296 TABLET,CHEWABLE ORAL ONCE
Status: DISCONTINUED | OUTPATIENT
Start: 2022-12-13 | End: 2022-12-24

## 2022-12-13 RX ADMIN — METOPROLOL TARTRATE 25 MG: 25 TABLET, FILM COATED ORAL at 21:24

## 2022-12-13 RX ADMIN — Medication 10 MG: at 21:23

## 2022-12-13 RX ADMIN — Medication 1 TABLET: at 08:11

## 2022-12-13 RX ADMIN — FUROSEMIDE 40 MG: 40 TABLET ORAL at 17:17

## 2022-12-13 RX ADMIN — TAMSULOSIN HYDROCHLORIDE 0.4 MG: 0.4 CAPSULE ORAL at 08:11

## 2022-12-13 RX ADMIN — FUROSEMIDE 40 MG: 40 TABLET ORAL at 08:11

## 2022-12-13 RX ADMIN — DICLOFENAC 4 G: 10 GEL TOPICAL at 17:17

## 2022-12-13 RX ADMIN — APIXABAN 5 MG: 5 TABLET, FILM COATED ORAL at 08:11

## 2022-12-13 RX ADMIN — DICLOFENAC 4 G: 10 GEL TOPICAL at 21:26

## 2022-12-13 RX ADMIN — DICLOFENAC 4 G: 10 GEL TOPICAL at 08:11

## 2022-12-13 RX ADMIN — APIXABAN 5 MG: 5 TABLET, FILM COATED ORAL at 21:24

## 2022-12-13 RX ADMIN — HYDRALAZINE HYDROCHLORIDE 5 MG: 25 TABLET, FILM COATED ORAL at 05:35

## 2022-12-13 RX ADMIN — HYDRALAZINE HYDROCHLORIDE 5 MG: 25 TABLET, FILM COATED ORAL at 14:37

## 2022-12-13 RX ADMIN — HYDRALAZINE HYDROCHLORIDE 5 MG: 25 TABLET, FILM COATED ORAL at 21:24

## 2022-12-13 RX ADMIN — ESCITALOPRAM 10 MG: 10 TABLET, FILM COATED ORAL at 08:11

## 2022-12-13 RX ADMIN — PANTOPRAZOLE SODIUM 40 MG: 40 TABLET, DELAYED RELEASE ORAL at 05:35

## 2022-12-13 RX ADMIN — METOPROLOL TARTRATE 25 MG: 25 TABLET, FILM COATED ORAL at 08:11

## 2022-12-13 NOTE — THERAPY PROGRESS REPORT/RE-CERT
Patient Name: Tre Kinney  : 1932    MRN: [id number]                              Today's Date: 2022       Admit Date: 10/5/2022    Visit Dx: No diagnosis found.  Patient Active Problem List   Diagnosis   • Acid reflux   • Ankle arthralgia   • Cardiac conduction disorder   • Closed fracture of distal fibula   • Arteriosclerosis of coronary artery   • Fracture of distal end of tibia   • Elevated cholesterol   • BP (high blood pressure)   • Hypertrophic polyarthritis   • OP (osteoporosis)   • Right heart failure (HCC)   • Heart failure, chronic, right-sided (HCC)   • SOB (shortness of breath)   • Palpitations   • CHF (congestive heart failure) (HCC)   • Congestive heart failure (HCC)   • Precordial pain   • Chronic anticoagulation   • Hypokalemia   • Anemia, unspecified type   • Generalized weakness     Past Medical History:   Diagnosis Date   • Anemia    • Arthritis    • CHF (congestive heart failure) (HCC)    • Chronic kidney disease    • Coronary artery disease    • H/O blood clots    • History of transfusion     no reaction    • Hypertension      Past Surgical History:   Procedure Laterality Date   • ABDOMINAL SURGERY     • APPENDECTOMY     • CARDIAC SURGERY     •  SECTION     • COLONOSCOPY N/A 3/7/2018    Procedure: COLONOSCOPY;  Surgeon: Willie Gresham MD;  Location:  [facility];  Service:    • CORONARY ANGIOPLASTY WITH STENT PLACEMENT     • ENDOSCOPY N/A 3/6/2018    Procedure: ESOPHAGOGASTRODUODENOSCOPY;  Surgeon: Willie Gresham MD;  Location:  [facility];  Service:    • PACEMAKER IMPLANTATION      left side    • SKIN BIOPSY     • TUBAL ABDOMINAL LIGATION        General Information     Row Name 22 1125          OT Time and Intention    Document Type progress note/recertification  -     Mode of Treatment individual therapy;occupational therapy  -     Row Name 22 1125          General Information    Patient Profile Reviewed yes  -KP     Existing  Precautions/Restrictions fall  -     Row Name 12/13/22 1125          Cognition    Orientation Status (Cognition) oriented x 4  -     Row Name 12/13/22 1125          Safety Issues, Functional Mobility    Impairments Affecting Function (Mobility) balance;strength;endurance/activity tolerance  -           User Key  (r) = Recorded By, (t) = Taken By, (c) = Cosigned By    Initials Name Provider Type     Linn Quevedo, OT Occupational Therapist                 Mobility/ADL's     Row Name 12/13/22 [facility]5          Bed Mobility    Comment, (Bed Mobility) Patient up in chair upon arrival.  -     Row Name 12/13/22 1125          Transfers    Transfers sit-stand transfer;stand-sit transfer;toilet transfer  -[facility] Name 12/13/22 1125          Sit-Stand Transfer    Sit-Stand [place] (Transfers) minimum assist (75% patient effort);moderate assist (50% patient effort)  -     Assistive Device (Sit-Stand Transfers) walker, front-wheeled  -[facility] Name 12/13/22 [facility]5          Stand-Sit Transfer    Stand-Sit [place] (Transfers) minimum assist (75% patient effort)  -     Assistive Device (Stand-Sit Transfers) walker, front-wheeled  [facility] Name 12/13/22 1125          Toilet Transfer    Type (Toilet Transfer) stand pivot/stand step  -     [place] Level (Toilet Transfer) minimum assist (75% patient effort)  -     Assistive Device (Toilet Transfer) commode, 3-in-1  -KP     Comment, (Toilet Transfer) stand pivot transfer between recliner and BSC  -[facility] Name 12/13/22 [facility]5          Activities of Daily Living    BADL Assessment/Intervention toileting  -[facility] Name 12/13/22 1125          Toileting Assessment/Training    [place] Level (Toileting) toileting skills;minimum assist (75% patient effort);moderate assist (50% patient effort)  -     Assistive Devices (Toileting) commode, 3-in-1  -KP     Position (Toileting) supported sitting  -           User Key  (r) = Recorded By, (t) = Taken  By, (c) = Cosigned By    Initials Name Provider Type    Linn Richardson OT Occupational Therapist               Obj/Interventions     Row Name 12/13/22 1127          Motor Skills    Functional Endurance fair plus  -     Therapeutic Exercise --  BUE resistive exercises with red theraband through functional movement patterns X20 reps X2 sets (AROM shoulder flexion)  -           User Key  (r) = Recorded By, (t) = Taken By, (c) = Cosigned By    Initials Name Provider Type    Linn Richardson OT Occupational Therapist               Goals/Plan     Row Name 12/13/22 1131          Bed Mobility Goal 1 (OT)    Progress/Outcomes (Bed Mobility Goal 1, OT) continuing progress toward goal;progress slower than expected  -     Row Name 12/13/22 1131          Transfer Goal 1 (OT)    Progress/Outcome (Transfer Goal 1, OT) continuing progress toward goal;progress slower than expected  -KP     [facility] Name 12/13/22 1131          Dressing Goal 1 (OT)    Progress/Outcome (Dressing Goal 1, OT) continuing progress toward goal;progress slower than expected  -KP     [facility] Name 12/13/22 1131          Strength Goal 1 (OT)    Progress/Outcome (Strength Goal 1, OT) continuing progress toward goal;progress slower than expected  -KP     [facility] Name 12/13/22 1131           Activity Tolerance Goal 1 (OT)    Progress/Outcome (Activity Tolerance Goal 1, OT) continuing progress toward goal;progress slower than expected  -           User Key  (r) = Recorded By, (t) = Taken By, (c) = Cosigned By    Initials Name Provider Type    Linn Richardson OT Occupational Therapist               Clinical Impression     Row Name 12/13/22 1127          Pain Assessment    Pretreatment Pain Rating 0/10 - no pain  -     Posttreatment Pain Rating 0/10 - no pain  -     Row Name 12/13/22 1127          Plan of Care Review    Plan of Care Reviewed With patient  -     Progress improving  -     Outcome Evaluation Patient seen for OT treatment session 8:55 to  9:44. Initial session addressed functional transfers/mobility with PT present. Then OT addressed UE strength, endurance, and ROM through seated exercises. She then was seen for ADL retraining as documented for toileting. Continue plan of care.  -     Row Name 12/13/22 1127          Therapy Plan Review/Discharge Plan (OT)    Anticipated Discharge Disposition (OT) skilled nursing facility;home with 24/7 care;home with home health  -     Row Name 12/13/22 1127          Positioning and Restraints    Pre-Treatment Position sitting in chair/recliner  -KP     Post Treatment Position chair  -KP     In Chair call light within reach  -           User Key  (r) = Recorded By, (t) = Taken By, (c) = Cosigned By    Initials Name Provider Type    Linn Richardson, OT Occupational Therapist               Outcome Measures     Row Name 12/13/22 1132          How much help from another is currently needed...    Putting on and taking off regular lower body clothing? 2  -KP     Bathing (including washing, rinsing, and drying) 2  -KP     Toileting (which includes using toilet bed pan or urinal) 3  -KP     Putting on and taking off regular upper body clothing 3  -KP     Taking care of personal grooming (such as brushing teeth) 3  -KP     Eating meals 4  -KP     AM-PAC 6 Clicks Score (OT) 17  -     Row Name 12/13/22 0811          How much help from another person do you currently need...    Turning from your back to your side while in flat bed without using bedrails? 3  -LG     Moving from lying on back to sitting on the side of a flat bed without bedrails? 3  -LG     Moving to and from a bed to a chair (including a wheelchair)? 2  -LG     Standing up from a chair using your arms (e.g., wheelchair, bedside chair)? 2  -LG     Climbing 3-5 steps with a railing? 1  -LG     To walk in hospital room? 2  -LG     AM-PAC 6 Clicks Score (PT) 13  -LG     Highest level of mobility 4 --> Transferred to chair/commode  -LG     Row Name 12/13/22  1132          Functional Assessment    Outcome Measure Options AM-PAC 6 Clicks Daily Activity (OT)  -           User Key  (r) = Recorded By, (t) = Taken By, (c) = Cosigned By    Initials Name Provider Type    Rena Mckeon, RN Registered Nurse    Linn Richardson OT Occupational Therapist                  OT Recommendation and Plan     Plan of Care Review  Plan of Care Reviewed With: patient  Progress: improving  Outcome Evaluation: Patient seen for OT treatment session 8:55 to 9:44. Initial session addressed functional transfers/mobility with PT present. Then OT addressed UE strength, endurance, and ROM through seated exercises. She then was seen for ADL retraining as documented for toileting. Continue plan of care.     Time Calculation:    Time Calculation- OT     Row Name 12/13/22 1133             Time Calculation- OT    OT Start Time 0855  -      OT Stop Time 0944  -      OT Time Calculation (min) 49 min  -      OT Received On 12/13/22  -            User Key  (r) = Recorded By, (t) = Taken By, (c) = Cosigned By    Initials Name Provider Type    Linn Richardson OT Occupational Therapist              Therapy Charges for Today     Code Description Service Date Service Provider Modifiers Qty    45927833201 HC OT THER PROC EA 15 MIN 12/12/2022 Linn Quevedo OT GO 1    83081964907 HC OT SELF CARE/MGMT/TRAIN EA 15 MIN 12/13/2022 Linn Quevedo OT GO 1    59014434334 HC OT THERAPEUTIC ACT EA 15 MIN 12/13/2022 Linn Quevedo OT GO 1    71786400264 HC OT THER PROC EA 15 MIN 12/13/2022 Linn Quevedo OT GO 1               Linn Quevedo OT  12/13/2022

## 2022-12-13 NOTE — PROGRESS NOTES
[facility] HOSPITALISTS CROSS COVER NOTE    Patient Identification:  Name:  Tre Kinney  Age:  89 y.o.  Sex:  female  :  1932  MRN:  [id number]  Visit number:  [id number]  Primary Care Provider:  Jessica Pedro APRN    Length of stay in inpatient status:  69    Brief Update     I saw the patient today.  She was again sitting in her recliner that is at bedside.  She continues to have leakage of liquid stool.  KUB does show a large stool burden.  I explained to the patient about the liquid stool in the small intestine is seeping around the large amount of solid stool in her large intestine and this is why she is having encopresis and why she cannot control this.  Thus, I will write several different as needed constipation meds so that we can try to remove most of this large stool burden.  I have informed nurse Chase about the need for the patient to have a bowel movement.  I will repeat the patient's blood work in the morning for her weekly check.  We await the uphill determination for swing bed from the insurance company.    Jannet Lagunas MD  [facility] Hospitalist  22  09:37 EST

## 2022-12-13 NOTE — PLAN OF CARE
Goal Outcome Evaluation:           Progress: improving  Outcome Evaluation: Patient has worked with PT and OT today, Patient has also got up to the chair with assist of nursing staff, patient voices no complaints or concerns throughout the shift.

## 2022-12-13 NOTE — PLAN OF CARE
Goal Outcome Evaluation:  Plan of Care Reviewed With: patient continues to rest no complaints at present will continue to monitor.

## 2022-12-13 NOTE — THERAPY TREATMENT NOTE
Acute Care - Physical Therapy Treatment Note   [place]     Patient Name: Tre Kinney  : 1932  MRN: [id number]  Today's Date: 2022   Onset of Illness/Injury or Date of Surgery: 10/06/22  Visit Dx:   No diagnosis found.  Patient Active Problem List   Diagnosis   • Acid reflux   • Ankle arthralgia   • Cardiac conduction disorder   • Closed fracture of distal fibula   • Arteriosclerosis of coronary artery   • Fracture of distal end of tibia   • Elevated cholesterol   • BP (high blood pressure)   • Hypertrophic polyarthritis   • OP (osteoporosis)   • Right heart failure (HCC)   • Heart failure, chronic, right-sided (HCC)   • SOB (shortness of breath)   • Palpitations   • CHF (congestive heart failure) (HCC)   • Congestive heart failure (HCC)   • Precordial pain   • Chronic anticoagulation   • Hypokalemia   • Anemia, unspecified type   • Generalized weakness     Past Medical History:   Diagnosis Date   • Anemia    • Arthritis    • CHF (congestive heart failure) (HCC)    • Chronic kidney disease    • Coronary artery disease    • H/O blood clots    • History of transfusion     no reaction    • Hypertension      Past Surgical History:   Procedure Laterality Date   • ABDOMINAL SURGERY     • APPENDECTOMY     • CARDIAC SURGERY     •  SECTION     • COLONOSCOPY N/A 3/7/2018    Procedure: COLONOSCOPY;  Surgeon: Willie Gresham MD;  Location:  [facility];  Service:    • CORONARY ANGIOPLASTY WITH STENT PLACEMENT     • ENDOSCOPY N/A 3/6/2018    Procedure: ESOPHAGOGASTRODUODENOSCOPY;  Surgeon: Willie Gresham MD;  Location:  [facility];  Service:    • PACEMAKER IMPLANTATION      left side    • SKIN BIOPSY     • TUBAL ABDOMINAL LIGATION       PT Assessment (last 12 hours)     PT Evaluation and Treatment     Row Name 22 1116          Physical Therapy Time and Intention    Subjective Information no complaints  -CT     Document Type therapy note (daily note)  -CT     Mode of Treatment physical  therapy  -CT     Patient Effort good  -CT     Comment Pt treatment session [id number] and again [id number] for ther ex. Pt perform ambulation 6 ft x 2 attempts.  -CT     Row Name 12/13/22 1116          General Information    Patient Profile Reviewed yes  -CT     Row Name 12/13/22 1116          Cognition    Affect/Mental Status (Cognition) WFL  -CT     Follows Commands (Cognition) WFL  -CT     Row Name 12/13/22 1116          Bed Mobility    Comment, (Bed Mobility) pt up in chiar pre/post therapy session  -CT     Row Name 12/13/22 1116          Transfers    Transfers sit-stand transfer;stand-sit transfer  -CT     Row Name 12/13/22 1116          Sit-Stand Transfer    Sit-Stand [place] (Transfers) minimum assist (75% patient effort);moderate assist (50% patient effort)  -CT     Assistive Device (Sit-Stand Transfers) walker, front-wheeled  -CT     Row Name 12/13/22 1116          Stand-Sit Transfer    Stand-Sit [place] (Transfers) moderate assist (50% patient effort)  with verbal cueing to reach back for chair  -CT     Assistive Device (Stand-Sit Transfers) walker, front-wheeled  -CT     Row Name 12/13/22 1116          Gait/Stairs (Locomotion)    [place] Level (Gait) moderate assist (50% patient effort);2 person assist;verbal cues;nonverbal cues (demo/gesture)  -CT     Assistive Device (Gait) walker, front-wheeled  -CT     Distance in Feet (Gait) 6 x 2  -CT     Pattern (Gait) step-to  -CT     Deviations/Abnormal Patterns (Gait) stride length decreased;gait speed decreased;festinating/shuffling;weight shifting decreased  -CT     Bilateral Gait Deviations weight shift ability decreased;forward flexed posture;knee buckling bilaterally  -CT     Row Name 12/13/22 1116          Motor Skills    Therapeutic Exercise --  seated ther ex: LAQ, seated marches, glut sets, hip abd/add, AP, HR x 25 reps  -CT     Row Name 12/13/22 1116          Coping    Observed Emotional State calm;cooperative;pleasant  -CT     Verbalized  Emotional State acceptance  -CT     Row Name 12/13/22 1116          Plan of Care Review    Plan of Care Reviewed With patient  -CT     Row Name 12/13/22 1116          Positioning and Restraints    Pre-Treatment Position sitting in chair/recliner  -CT     Post Treatment Position chair  -CT     In Chair sitting;call light within reach;encouraged to call for assist  -CT           User Key  (r) = Recorded By, (t) = Taken By, (c) = Cosigned By    Initials Name Provider Type    CT Bhargavi Vásquez PT Physical Therapist                Physical Therapy Education     Title: PT OT SLP Therapies (Done)     Topic: Physical Therapy (Done)     Point: Mobility training (Done)     Learning Progress Summary           Patient Acceptance, E,TB, VU by DL at 12/13/2022 0240    Acceptance, D,E, VU,NR by LL at 12/12/2022 1455    Acceptance, E,TB, VU by RW at 12/9/2022 1451    Acceptance, E,TB, VU by BC at 11/27/2022 1319    Acceptance, E,TB, VU by RM at 11/20/2022 1449    Acceptance, E,TB, VU by RM at 11/18/2022 1021    Acceptance, E,TB, VU by RH at 11/16/2022 1110    Acceptance, E,TB, VU by KK at 11/14/2022 1638    Acceptance, E,TB, VU by KK at 11/13/2022 1648    Acceptance, E,TB, VU by KG at 11/10/2022 1827    Acceptance, E,TB, VU by DM at 11/9/2022 1638    Acceptance, E,TB, VU by KK at 11/8/2022 1754    Acceptance, E,TB, VU by KK at 11/7/2022 1635    Acceptance, E,D, VU,NR by LL at 11/5/2022 1325    Acceptance, E, VU by EB at 11/4/2022 1448    Acceptance, E,TB, VU by KM at 11/4/2022 1101   Family Acceptance, D,E, VU,NR by LL at 12/12/2022 1455                   Point: Home exercise program (Done)     Learning Progress Summary           Patient Acceptance, E,TB, VU by DL at 12/13/2022 0240    Acceptance, D,E, VU,NR by LL at 12/12/2022 1455    Acceptance, E,TB, VU by RW at 12/9/2022 1451    Acceptance, E,TB, VU by BC at 11/27/2022 1319    Acceptance, E,TB, VU by RM at 11/20/2022 1449    Acceptance, E,TB, VU by RM at 11/18/2022 1021     Acceptance, E,TB, VU by RH at 11/16/2022 1110    Acceptance, E,TB, VU by KK at 11/14/2022 1638    Acceptance, E,TB, VU by KK at 11/13/2022 1648    Acceptance, E,TB, VU by KG at 11/10/2022 1827    Acceptance, E,TB, VU by DM at 11/9/2022 1638    Acceptance, E,TB, VU by KK at 11/8/2022 1754    Acceptance, E,TB, VU by KK at 11/7/2022 1635    Acceptance, E,D, VU,NR by LL at 11/5/2022 1325    Acceptance, E, VU by EB at 11/4/2022 1448    Acceptance, E,TB, VU by KM at 11/4/2022 1101   Family Acceptance, D,E, VU,NR by LL at 12/12/2022 1455                   Point: Body mechanics (Done)     Learning Progress Summary           Patient Acceptance, E,TB, VU by DL at 12/13/2022 0240    Acceptance, D,E, VU,NR by LL at 12/12/2022 1455    Acceptance, E,TB, VU by RW at 12/9/2022 1451    Acceptance, E,TB, VU by BC at 11/27/2022 1319    Acceptance, E,TB, VU by RM at 11/20/2022 1449    Acceptance, E,TB, VU by RM at 11/18/2022 1021    Acceptance, E,TB, VU by RH at 11/16/2022 1110    Acceptance, E,TB, VU by KK at 11/14/2022 1638    Acceptance, E,TB, VU by KK at 11/13/2022 1648    Acceptance, E,TB, VU by KG at 11/10/2022 1827    Acceptance, E,TB, VU by DM at 11/9/2022 1638    Acceptance, E,TB, VU by KK at 11/8/2022 1754    Acceptance, E,TB, VU by KK at 11/7/2022 1635    Acceptance, E,D, VU,NR by LL at 11/5/2022 1325    Acceptance, E, VU by EB at 11/4/2022 1448    Acceptance, E,TB, VU by KM at 11/4/2022 1101   Family Acceptance, D,E, VU,NR by LL at 12/12/2022 1455                   Point: Precautions (Done)     Learning Progress Summary           Patient Acceptance, E,TB, VU by DL at 12/13/2022 0240    Acceptance, D,E, VU,NR by LL at 12/12/2022 1455    Acceptance, E,TB, VU by RW at 12/9/2022 1451    Acceptance, E,TB, VU by BC at 11/27/2022 1319    Acceptance, E,TB, VU by RM at 11/20/2022 1449    Acceptance, E,TB, VU by RM at 11/18/2022 1021    Acceptance, E,TB, VU by RH at 11/16/2022 1110    Acceptance, E,TB, VU by KK at 11/14/2022 1638     Acceptance, E,TB, VU by KK at 11/13/2022 1648    Acceptance, E,TB, VU by KG at 11/10/2022 1827    Acceptance, E,TB, VU by DM at 11/9/2022 1638    Acceptance, E,TB, VU by KK at 11/8/2022 1754    Acceptance, E,TB, VU by KK at 11/7/2022 1635    Acceptance, E,D, VU,NR by LL at 11/5/2022 1325    Acceptance, E, VU by EB at 11/4/2022 1448    Acceptance, E,TB, VU by KM at 11/4/2022 1101   Family Acceptance, D,E, VU,NR by LL at 12/12/2022 1455                               User Key     Initials Effective Dates Name Provider Type Discipline    LL 05/02/16 -  Neda Garcia PTA Physical Therapist Assistant PT    RM 06/14/22 -  Bethany Krause RN Registered Nurse Nurse    DM 06/16/21 -  Moniz, Deborah, RN Registered Nurse Nurse    BC 08/02/22 -  Collett, Brittany, RN Registered Nurse Nurse    KM 05/24/22 -  Mao Vázquez, EDEL Physical Therapist PT    EB 09/22/22 -  Cedric Oliver, RN Registered Nurse Nurse    KK 06/27/22 -  Chelsi Bolivar, RN Registered Nurse Nurse    DL 03/16/22 -  Francy Garcia, RN Registered Nurse Nurse    KG 09/22/22 -  Verónica Rodriguez, RN Registered Nurse Nurse    RW 09/22/22 -  Bethany Schofield, RN Registered Nurse Nurse    RH 08/03/22 -  Jung Russell RN Extern Registered Nurse Nurse              PT Recommendation and Plan  Anticipated Discharge Disposition (PT): home with 24/7 care, skilled nursing facility  Planned Therapy Interventions (PT): balance training, bed mobility training, gait training, home exercise program, manual therapy techniques, motor coordination training, neuromuscular re-education, patient/family education, postural re-education, strengthening, transfer training  Therapy Frequency (PT): 5 times/wk (5-6 times/wk)  Progress Summary (PT)  Progress Toward Functional Goals (PT): progress toward functional goals is gradual  Plan of Care Reviewed With: patient  Progress: improving   Outcome Measures     Row Name 12/12/22 1448 12/10/22 1200          How much help from another  person do you currently need...    Turning from your back to your side while in flat bed without using bedrails? 3  -LL --     Moving from lying on back to sitting on the side of a flat bed without bedrails? 3  -LL --     Moving to and from a bed to a chair (including a wheelchair)? 2  -LL --     Standing up from a chair using your arms (e.g., wheelchair, bedside chair)? 2  -LL --     Climbing 3-5 steps with a railing? 1  -LL --     To walk in hospital room? 2  -LL --     AM-PAC 6 Clicks Score (PT) 13  -LL --        How much help from another is currently needed...    Putting on and taking off regular lower body clothing? 2  -KP 2  -HB     Bathing (including washing, rinsing, and drying) 2  -KP 2  -HB     Toileting (which includes using toilet bed pan or urinal) 3  -KP 3  -HB     Putting on and taking off regular upper body clothing 3  -KP 3  -HB     Taking care of personal grooming (such as brushing teeth) 3  -KP 3  -HB     Eating meals 4  -KP 4  -HB     AM-PAC 6 Clicks Score (OT) 17  -KP 17  -HB        Functional Assessment    Outcome Measure Options AM-PAC 6 Clicks Basic Mobility (PT)  -LL --           User Key  (r) = Recorded By, (t) = Taken By, (c) = Cosigned By    Initials Name Provider Type    LL Neda Garcia, PTA Physical Therapist Assistant    Linn Richardson, OT Occupational Therapist     Katelynn Fernández, OT Occupational Therapist                 Time Calculation:    PT Charges     Row Name 12/13/22 1121             Time Calculation    Start Time 0855  -CT      PT Received On 12/13/22  -CT         Time Calculation- PT    Total Timed Code Minutes- PT 40 minute(s)  -CT            User Key  (r) = Recorded By, (t) = Taken By, (c) = Cosigned By    Initials Name Provider Type    CT Bhargavi Vásquez, EDEL Physical Therapist              Therapy Charges for Today     Code Description Service Date Service Provider Modifiers Qty    50663105173 HC GAIT TRAINING EA 15 MIN 12/13/2022 Bhargavi Vásquez, PT GP 1     [id number]  PT THER PROC EA 15 MIN 12/13/2022 Bhargavi Vásquez, PT GP 1    [id number]  PT THERAPEUTIC ACT EA 15 MIN 12/13/2022 Bhargavi Vásquez, PT GP 1          PT G-Codes  Outcome Measure Options: AM-PAC 6 Clicks Basic Mobility (PT)  AM-PAC 6 Clicks Score (PT): 13  AM-PAC 6 Clicks Score (OT): 17    Bhargavi Vásquez, PT  12/13/2022

## 2022-12-13 NOTE — PROGRESS NOTES
Nutrition Services    Patient Name:  Tre Kinney  YOB: 1932  MRN: [id number]  Admit Date:  10/5/2022    BMI: 27.09  Diet: Regular  Supplement: Magic cup BID  Intake: 73% x 12 meals  Fluid: not meeting needs      Electronically signed by:  Jose Oneal RD  12/13/22 10:11 EST

## 2022-12-14 LAB
ALBUMIN SERPL-MCNC: 2.79 G/DL (ref 3.5–5.2)
ALBUMIN/GLOB SERPL: 1 G/DL
ALP SERPL-CCNC: 51 U/L (ref 39–117)
ALT SERPL W P-5'-P-CCNC: 6 U/L (ref 1–33)
ANION GAP SERPL CALCULATED.3IONS-SCNC: 10.8 MMOL/L (ref 5–15)
AST SERPL-CCNC: 10 U/L (ref 1–32)
BILIRUB SERPL-MCNC: 0.2 MG/DL (ref 0–1.2)
BUN SERPL-MCNC: 33 MG/DL (ref 8–23)
BUN/CREAT SERPL: 15 (ref 7–25)
CALCIUM SPEC-SCNC: 8.5 MG/DL (ref 8.2–9.6)
CHLORIDE SERPL-SCNC: 100 MMOL/L (ref 98–107)
CO2 SERPL-SCNC: 27.2 MMOL/L (ref 22–29)
CREAT SERPL-MCNC: 2.2 MG/DL (ref 0.57–1)
DEPRECATED RDW RBC AUTO: 50.4 FL (ref 37–54)
EGFRCR SERPLBLD CKD-EPI 2021: 20.8 ML/MIN/1.73
ERYTHROCYTE [DISTWIDTH] IN BLOOD BY AUTOMATED COUNT: 14.7 % (ref 12.3–15.4)
GLOBULIN UR ELPH-MCNC: 2.7 GM/DL
GLUCOSE SERPL-MCNC: 107 MG/DL (ref 65–99)
HCT VFR BLD AUTO: 30.3 % (ref 34–46.6)
HGB BLD-MCNC: 9.7 G/DL (ref 12–15.9)
MAGNESIUM SERPL-MCNC: 1.8 MG/DL (ref 1.6–2.4)
MCH RBC QN AUTO: 30 PG (ref 26.6–33)
MCHC RBC AUTO-ENTMCNC: 32 G/DL (ref 31.5–35.7)
MCV RBC AUTO: 93.8 FL (ref 79–97)
PHOSPHATE SERPL-MCNC: 4.1 MG/DL (ref 2.5–4.5)
PLATELET # BLD AUTO: 178 10*3/MM3 (ref 140–450)
PMV BLD AUTO: 9.7 FL (ref 6–12)
POTASSIUM SERPL-SCNC: 3.8 MMOL/L (ref 3.5–5.2)
PROT SERPL-MCNC: 5.5 G/DL (ref 6–8.5)
RBC # BLD AUTO: 3.23 10*6/MM3 (ref 3.77–5.28)
SODIUM SERPL-SCNC: 138 MMOL/L (ref 136–145)
WBC NRBC COR # BLD: 5.92 10*3/MM3 (ref 3.4–10.8)

## 2022-12-14 PROCEDURE — 97116 GAIT TRAINING THERAPY: CPT

## 2022-12-14 PROCEDURE — 63710000001 PROCHLORPERAZINE MALEATE PER 5 MG: Performed by: INTERNAL MEDICINE

## 2022-12-14 PROCEDURE — 83735 ASSAY OF MAGNESIUM: CPT | Performed by: INTERNAL MEDICINE

## 2022-12-14 PROCEDURE — 85027 COMPLETE CBC AUTOMATED: CPT | Performed by: INTERNAL MEDICINE

## 2022-12-14 PROCEDURE — 97110 THERAPEUTIC EXERCISES: CPT

## 2022-12-14 PROCEDURE — 80053 COMPREHEN METABOLIC PANEL: CPT | Performed by: INTERNAL MEDICINE

## 2022-12-14 PROCEDURE — 99232 SBSQ HOSP IP/OBS MODERATE 35: CPT | Performed by: INTERNAL MEDICINE

## 2022-12-14 PROCEDURE — 84100 ASSAY OF PHOSPHORUS: CPT | Performed by: INTERNAL MEDICINE

## 2022-12-14 RX ORDER — MAGNESIUM SULFATE HEPTAHYDRATE 40 MG/ML
4 INJECTION, SOLUTION INTRAVENOUS ONCE
Status: DISCONTINUED | OUTPATIENT
Start: 2022-12-14 | End: 2022-12-14

## 2022-12-14 RX ADMIN — ESCITALOPRAM 10 MG: 10 TABLET, FILM COATED ORAL at 08:55

## 2022-12-14 RX ADMIN — HYDRALAZINE HYDROCHLORIDE 5 MG: 25 TABLET, FILM COATED ORAL at 06:40

## 2022-12-14 RX ADMIN — DICLOFENAC 4 G: 10 GEL TOPICAL at 21:35

## 2022-12-14 RX ADMIN — TAMSULOSIN HYDROCHLORIDE 0.4 MG: 0.4 CAPSULE ORAL at 08:55

## 2022-12-14 RX ADMIN — PANTOPRAZOLE SODIUM 40 MG: 40 TABLET, DELAYED RELEASE ORAL at 06:40

## 2022-12-14 RX ADMIN — METOPROLOL TARTRATE 25 MG: 25 TABLET, FILM COATED ORAL at 21:34

## 2022-12-14 RX ADMIN — APIXABAN 5 MG: 5 TABLET, FILM COATED ORAL at 21:34

## 2022-12-14 RX ADMIN — HYDRALAZINE HYDROCHLORIDE 5 MG: 25 TABLET, FILM COATED ORAL at 21:34

## 2022-12-14 RX ADMIN — DOCUSATE SODIUM 100 MG: 100 CAPSULE ORAL at 08:54

## 2022-12-14 RX ADMIN — FUROSEMIDE 40 MG: 40 TABLET ORAL at 18:05

## 2022-12-14 RX ADMIN — DICLOFENAC 4 G: 10 GEL TOPICAL at 08:56

## 2022-12-14 RX ADMIN — DICLOFENAC 4 G: 10 GEL TOPICAL at 18:06

## 2022-12-14 RX ADMIN — FUROSEMIDE 40 MG: 40 TABLET ORAL at 08:55

## 2022-12-14 RX ADMIN — PROCHLORPERAZINE MALEATE 5 MG: 5 TABLET ORAL at 11:54

## 2022-12-14 RX ADMIN — DOCUSATE SODIUM 100 MG: 100 CAPSULE ORAL at 21:34

## 2022-12-14 RX ADMIN — Medication 10 MG: at 21:34

## 2022-12-14 RX ADMIN — METOPROLOL TARTRATE 25 MG: 25 TABLET, FILM COATED ORAL at 08:57

## 2022-12-14 RX ADMIN — APIXABAN 5 MG: 5 TABLET, FILM COATED ORAL at 08:54

## 2022-12-14 RX ADMIN — HYDRALAZINE HYDROCHLORIDE 5 MG: 25 TABLET, FILM COATED ORAL at 13:43

## 2022-12-14 RX ADMIN — DICLOFENAC 4 G: 10 GEL TOPICAL at 11:14

## 2022-12-14 RX ADMIN — Medication 1 TABLET: at 08:55

## 2022-12-14 RX ADMIN — POLYETHYLENE GLYCOL (3350) 17 G: 17 POWDER, FOR SOLUTION ORAL at 00:53

## 2022-12-14 RX ADMIN — MAGNESIUM GLUCONATE 500 MG ORAL TABLET 400 MG: 500 TABLET ORAL at 18:40

## 2022-12-14 NOTE — PLAN OF CARE
Goal Outcome Evaluation:  Plan of Care Reviewed With: patient           Outcome Evaluation: Pt slept throughout night. Pt recieved PRN miralax per orders. No acute changes this shift. VSS. Will continue with plan of care.

## 2022-12-14 NOTE — PLAN OF CARE
Goal Outcome Evaluation:  Plan of Care Reviewed With: patient        Progress: no change  Outcome Evaluation: Pt sitting in chair all shift. Pt complained of nausea early this AM, states now she feels better. No further changes. Will continue plan of care.

## 2022-12-14 NOTE — CASE MANAGEMENT/SOCIAL WORK
Discharge Planning Assessment   [place]     Patient Name: Tre Kinney  MRN: [id number]  Today's Date: 12/14/2022    Admit Date: 10/5/2022    Plan: Pt was admitted to swing bed on 10/5/22. Discharge appeal is still pending per swing bed RN. SS to follow.      Discharge Plan     Row Name 12/14/22 1759       Plan    Plan Pt was admitted to swing bed on 10/5/22. Discharge appeal is still pending per swing bed RN. SS to follow.              XANDER Craig

## 2022-12-14 NOTE — PROGRESS NOTES
[facility] HOSPITALIST PROGRESS NOTE     Patient Identification:  Name:  Tre Kinney  Age:  89 y.o.  Sex:  female  :  1932  MRN:  [id number]  Visit Number:  [id number]  ROOM: [address] [place] [address]     Primary Care Provider:  Jessica Pedro APRN     Date of Admission: 10/5/2022    Length of stay in inpatient status:  70    Subjective     Chief Compliant:  Generalized weakness post fall    History of Presenting Illness: When I saw the patient today, she was again sitting at her bedside recliner.  She did sitting chest pain or trouble breathing.  She does have some nausea.  She denied emesis and diarrhea.  She denied abdominal pain.  Patient states that she feels more depressed today than she had been.    Objective     Current Hospital Meds:  apixaban, 5 mg, Oral, Q12H  Diclofenac Sodium, 4 g, Topical, 4x Daily  docusate sodium, 100 mg, Oral, BID  escitalopram, 10 mg, Oral, Daily  furosemide, 40 mg, Oral, BID  gabapentin, 300 mg, Oral, Nightly  hydrALAZINE, 5 mg, Oral, Q8H  magnesium citrate, 296 mL, Oral, Once  melatonin, 10 mg, Oral, Nightly  metoprolol tartrate, 25 mg, Oral, Q12H  multivitamin, 1 tablet, Oral, Daily  pantoprazole, 40 mg, Oral, Q AM  sodium chloride, 10 mL, Intravenous, Q12H  sodium chloride, 10 mL, Intravenous, Q12H  tamsulosin, 0.4 mg, Oral, Daily    Pharmacy Consult,       Current Antimicrobial Therapy:  Anti-Infectives (From admission, onward)    Ordered     Dose/Rate Route Frequency Start Stop    22 1013  fluconazole (DIFLUCAN) tablet 100 mg        Ordering Provider: Lucio Murphy MD    100 mg Oral Once 22 1200 22 1145    22 1536  cefTRIAXone (ROCEPHIN) 1 g in sodium chloride 0.9 % 100 mL IVPB-VTB        Ordering Provider: Dante Levine MD    1 g  200 mL/hr over 30 Minutes Intravenous Every 24 Hours 22 2300 22 0110        Current Diuretic Therapy:  Diuretics (From admission, onward)    Ordered     Dose/Rate Route Frequency Start Stop    10/05/22  1252  furosemide (LASIX) tablet 40 mg        Ordering Provider: Jannet Lagunas MD    40 mg Oral 2 Times Daily (Diuretics) 10/05/22 1800          ----------------------------------------------------------------------------------------------------------------------  Vital Signs:  Temp:  [97.8 °F (36.6 °C)-98.2 °F (36.8 °C)] 97.9 °F (36.6 °C)  Heart Rate:  [75-77] 76  Resp:  [16] 16  BP: (129-140)/(62-75) 140/65  SpO2:  [95 %] 95 %  on   ;   Device (Oxygen Therapy): room air  Body mass index is 27.29 kg/m².    Wt Readings from Last 3 Encounters:   12/14/22 79.1 kg (174 lb 4.8 oz)   10/05/22 76.6 kg (168 lb 14.4 oz)   09/08/22 76.7 kg (169 lb)     Intake & Output (last 3 days)       12/11 0701  12/12 0700 12/12 0701 12/13 0700 12/13 0701 12/14 0700 12/14 0701  12/15 0700    P.O. 6881 558 2369 120    I.V. (mL/kg) 0 (0) 0 (0) 0 (0)     Total Intake(mL/kg) 1560 (19.8) 360 (4.6) 1080 (13.7) 120 (1.5)    Urine (mL/kg/hr) 1400 (0.7) 550 (0.3) 2100 (1.1)     Stool 0 0 0     Total Output 3282 898 8228     Net +160 -190 -1020 +120            Stool Unmeasured Occurrence 1 x 3 x 1 x         Diet: Texture: Regular Texture (IDDSI 7); Fluid Consistency: Thin (IDDSI 0); Regular/House Diet  ----------------------------------------------------------------------------------------------------------------------  Physical Exam; her exam is unchanged compared to 1.5 weeks ago.  Vitals and nursing note reviewed.   Constitutional:       General: She is sleeping. She is not in acute distress.     Appearance: She is not ill-appearing.   Cardiovascular:      Rate and Rhythm: Normal rate and regular rhythm.      Pulses: Normal pulses.      Heart sounds: No murmur heard.  Pulmonary:      Effort: Pulmonary effort is normal. No respiratory distress.      Breath sounds: No wheezing or rales.   Musculoskeletal:         General: No swelling or deformity.   Skin:     Coloration: Skin is not jaundiced or pale.   Neurological:      Mental Status: She  is oriented to person, place, and time and easily aroused. Mental status is at baseline.      Cranial Nerves: No cranial nerve deficit.   Psychiatric:         Mood and Affect: Mood normal.         Behavior: Behavior normal.   ----------------------------------------------------------------------------------------------------------------------  Tele:   None  ----------------------------------------------------------------------------------------------------------------------  LABS:    CBC and coagulation:  Results from last 7 days   Lab Units 12/14/22  0049   WBC 10*3/mm3 5.92   HEMOGLOBIN g/dL 9.7*   HEMATOCRIT % 30.3*   MCV fL 93.8   MCHC g/dL 32.0   PLATELETS 10*3/mm3 178     Renal and electrolytes:  Results from last 7 days   Lab Units 12/14/22  0049   SODIUM mmol/L 138   POTASSIUM mmol/L 3.8   MAGNESIUM mg/dL 1.8   CHLORIDE mmol/L 100   CO2 mmol/L 27.2   BUN mg/dL 33*   CREATININE mg/dL 2.20*   CALCIUM mg/dL 8.5   PHOSPHORUS mg/dL 4.1   GLUCOSE mg/dL 107*     Estimated Creatinine Clearance: 18.4 mL/min (A) (by C-G formula based on SCr of 2.2 mg/dL (H)).    Liver and pancreatic function:  Results from last 7 days   Lab Units 12/14/22 0049   ALBUMIN g/dL 2.79*   BILIRUBIN mg/dL 0.2   ALK PHOS U/L 51   AST (SGOT) U/L 10   ALT (SGPT) U/L 6       I have personally looked at the labs and they are summarized above.    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  -As of 11/18/2022,  mixed gram negative UTI (the bacteria were not identified on the culture)  -History of recent acute superior endplate compression fracture of L1 with retropulsion (9/8/2022)  -History of leukocytosis, suspect due to mechanical fall that occurred prior to the [facility] 9/19/2022-10/5/2022  -Acute kidney injury on top of chronic kidney disease stage IV (baseline creatinine 2.1-2.4 and admission creatinine 6.1) and anion gap metabolic acidosis present on admission (suspect due to a combination of lactic acidosis from the acute blood loss anemia and renal acidosis) during the [facility] 9/19/2022-10/5/2022 hospitalization  -History of paroxysmal atrial fibrillation, in sinus rhythm during this hospitalization  -History of SSS s/p pacemaker placement  -History of acute blood loss anemia in 2018, suspect secondary to diverticular bleed  -History of CAD status post previous cardiac stenting  -History of chronic CHFpEF, with no acute exacerbation during this hospitalization  -History of generalized anxiety disorder/depression    I have added some magnesium oxide for the borderline low magnesium level.  When comparing her labs down to labs a few days ago, they are about the same.  Thus, I will not make any changes to her medications.  Please note that the blood pressures are at goal and stable.  We will continue to monitor the blood pressures.  We await insurance decision about any further days after they evaluate the appeal.  From medical standpoint, the patient is stable.  We will continue to monitor her closely.    VTE Prophylaxis:   Mechanical Order History:      Ordered        10/05/22 1252  Place Sequential Compression Device  Once            10/05/22 1252  Maintain Sequential Compression Device  Continuous                    Pharmalogical Order History:      Ordered     Dose Route Frequency Stop    10/27/22 1034  apixaban (ELIQUIS) tablet 5 mg         5 mg PO Every 12 Hours Scheduled --     10/21/22 1301  Enoxaparin Sodium (LOVENOX) syringe 70 mg  Status:  Discontinued         1 mg/kg SC Every 24 Hours 10/27/22 1034    10/17/22 1147  Enoxaparin Sodium (LOVENOX) syringe 80 mg  Status:  Discontinued         1 mg/kg SC Every 24 Hours 10/21/22 1301    10/12/22 1847  Enoxaparin Sodium (LOVENOX) syringe 70 mg  Status:  Discontinued         1 mg/kg SC Every 24 Hours 10/17/22 1147    10/12/22 1831  Pharmacy to Dose enoxaparin (LOVENOX)  Status:  Discontinued         -- XX Continuous PRN 10/13/22 1417    10/05/22 1252  heparin (porcine) 5000 UNIT/ML injection 5,000 Units  Status:  Discontinued         5,000 Units SC Every 12 Hours Scheduled 10/12/22 1846           Disposition:  Family and patient request home with family once physically improved    Jannet Lagunas MD  [facility]  12/14/22  11:15 EST

## 2022-12-14 NOTE — THERAPY TREATMENT NOTE
Acute Care - Occupational Therapy Treatment Note   [facility]     Patient Name: Tre Kinney  : 1932  MRN: [id number]  Today's Date: 2022  Onset of Illness/Injury or Date of Surgery: 10/06/22     Referring Physician: Janneth    Admit Date: 10/5/2022     No diagnosis found.  Patient Active Problem List   Diagnosis   • Acid reflux   • Ankle arthralgia   • Cardiac conduction disorder   • Closed fracture of distal fibula   • Arteriosclerosis of coronary artery   • Fracture of distal end of tibia   • Elevated cholesterol   • BP (high blood pressure)   • Hypertrophic polyarthritis   • OP (osteoporosis)   • Right heart failure (HCC)   • Heart failure, chronic, right-sided (HCC)   • SOB (shortness of breath)   • Palpitations   • CHF (congestive heart failure) (HCC)   • Congestive heart failure (HCC)   • Precordial pain   • Chronic anticoagulation   • Hypokalemia   • Anemia, unspecified type   • Generalized weakness     Past Medical History:   Diagnosis Date   • Anemia    • Arthritis    • CHF (congestive heart failure) ([facility])    • Chronic kidney disease    • Coronary artery disease    • H/O blood clots    • History of transfusion     no reaction    • Hypertension      Past Surgical History:   Procedure Laterality Date   • ABDOMINAL SURGERY     • APPENDECTOMY     • CARDIAC SURGERY     •  SECTION     • COLONOSCOPY N/A 3/7/2018    Procedure: COLONOSCOPY;  Surgeon: Willie Gresham MD;  Location:  [facility];  Service:    • CORONARY ANGIOPLASTY WITH STENT PLACEMENT     • ENDOSCOPY N/A 3/6/2018    Procedure: ESOPHAGOGASTRODUODENOSCOPY;  Surgeon: Willie Gresham MD;  Location:  [facility];  Service:    • PACEMAKER IMPLANTATION      left side    • SKIN BIOPSY     • TUBAL ABDOMINAL LIGATION           OT ASSESSMENT FLOWSHEET (last 12 hours)     OT Evaluation and Treatment     Row Name 22 6624                   OT Time and Intention    Subjective Information complains of;fatigue  not feeling well   -        Document Type therapy note (daily note)  -        Mode of Treatment individual therapy;occupational therapy  -KP        Total Minutes, Occupational Therapy 40  -KP        Patient Effort adequate  -        Symptoms Noted During/After Treatment fatigue  -        Comment Patient seen for therapy session on this date from 1250 to 1330. She was seen with PT for safety during mobility tasks. Patient reports overall not feeling well today. Son on Facetime during session.  -           General Information    Patient Profile Reviewed yes  -        General Observations of Patient Patient with fair tolerance on this date. Fatigue noted and decreased tolerance for activity.  -        Existing Precautions/Restrictions fall  -KP        Limitations/Impairments safety/cognitive  -           Pain Assessment    Pretreatment Pain Rating 0/10 - no pain  -KP        Posttreatment Pain Rating 0/10 - no pain  -KP           Cognition    Affect/Mental Status (Cognition) WFL  -        Orientation Status (Cognition) oriented x 4  -KP        Follows Commands (Cognition) WFL  -        Personal Safety Interventions gait belt;nonskid shoes/slippers when out of bed  -           Toileting Assessment/Training    [place] Level (Toileting) toileting skills;minimum assist (75% patient effort);moderate assist (50% patient effort)  -        Assistive Devices (Toileting) commode, 3-in-1  -KP        Position (Toileting) supported sitting  -           Toilet Transfer    Type (Toilet Transfer) stand pivot/stand step  -        [place] Level (Toilet Transfer) minimum assist (75% patient effort)  -        Assistive Device (Toilet Transfer) commode, 3-in-1  -KP           Motor Skills    Functional Endurance fair minus  -KP        Therapeutic Exercise other (see comments)  BUE resistive exercises with red theraband X20 reps X2 sets from sitting in chair  -KP           Positioning and Restraints    Pre-Treatment  Position sitting in chair/recliner  -KP        Post Treatment Position chair  -KP        In Chair call light within reach  -KP              User Key  (r) = Recorded By, (t) = Taken By, (c) = Cosigned By    Initials Name Effective Dates    Linn Richardson, VELASQUEZ 06/16/21 -                        OT Recommendation and Plan     Progress Toward Functional Goals (OT): progress toward functional goals is gradual  Plan of Care Review  Plan of Care Reviewed With: patient  Progress: improving  Outcome Evaluation: Patient seen for OT treatment session 8:55 to 9:44. Initial session addressed functional transfers/mobility with PT present. Then OT addressed UE strength, endurance, and ROM through seated exercises. She then was seen for ADL retraining as documented for toileting. Continue plan of care.  Plan of Care Reviewed With: patient  Outcome Evaluation: Patient seen for OT treatment session 8:55 to 9:44. Initial session addressed functional transfers/mobility with PT present. Then OT addressed UE strength, endurance, and ROM through seated exercises. She then was seen for ADL retraining as documented for toileting. Continue plan of care.     Outcome Measures     Row Name 12/14/22 1356 12/12/22 1448          How much help from another person do you currently need...    Turning from your back to your side while in flat bed without using bedrails? -- 3  -LL     Moving from lying on back to sitting on the side of a flat bed without bedrails? -- 3  -LL     Moving to and from a bed to a chair (including a wheelchair)? -- 2  -LL     Standing up from a chair using your arms (e.g., wheelchair, bedside chair)? -- 2  -LL     Climbing 3-5 steps with a railing? -- 1  -LL     To walk in hospital room? -- 2  -LL     AM-PAC 6 Clicks Score (PT) -- 13  -LL        How much help from another is currently needed...    Putting on and taking off regular lower body clothing? 2  -KP 2  -KP     Bathing (including washing, rinsing, and drying) 3  -KP  2  -KP     Toileting (which includes using toilet bed pan or urinal) 3  -KP 3  -KP     Putting on and taking off regular upper body clothing 3  -KP 3  -KP     Taking care of personal grooming (such as brushing teeth) 3  -KP 3  -KP     Eating meals 4  -KP 4  -KP     AM-PAC 6 Clicks Score (OT) 18  -KP 17  -KP        Functional Assessment    Outcome Measure Options -- AM-PAC 6 Clicks Basic Mobility (PT)  -LL           User Key  (r) = Recorded By, (t) = Taken By, (c) = Cosigned By    Initials Name Provider Type     Neda Garcia PTA Physical Therapist Assistant     Linn Quevedo OT Occupational Therapist                Time Calculation:    Time Calculation- OT     Row Name 12/14/22 1358             Time Calculation- OT    OT Start Time 1250  -      OT Stop Time 1330  -      OT Time Calculation (min) 40 min  -      OT Received On 12/14/22  -         Timed Charges    76496 - OT Therapeutic Exercise Minutes 17  -KP         Total Minutes    Timed Charges Total Minutes 17  -KP       Total Minutes 17  -KP            User Key  (r) = Recorded By, (t) = Taken By, (c) = Cosigned By    Initials Name Provider Type     Linn Quevedo OT Occupational Therapist              Therapy Charges for Today     Code Description Service Date Service Provider Modifiers Qty    20274822016 HC OT SELF CARE/MGMT/TRAIN EA 15 MIN 12/13/2022 Linn Quevedo OT GO 1    32260506668 HC OT THERAPEUTIC ACT EA 15 MIN 12/13/2022 Linn Quevedo OT GO 1    97317947222 HC OT THER PROC EA 15 MIN 12/13/2022 Linn Quevedo OT GO 1    93668297365 HC OT THER PROC EA 15 MIN 12/14/2022 Linn Quevedo OT GO 1               Linn Quevedo OT  12/14/2022

## 2022-12-14 NOTE — THERAPY TREATMENT NOTE
Acute Care - Physical Therapy Treatment Note   [place]     Patient Name: Tre Kinney  : 1932  MRN: [id number]  Today's Date: 2022   Onset of Illness/Injury or Date of Surgery: 10/06/22  Visit Dx:   No diagnosis found.  Patient Active Problem List   Diagnosis   • Acid reflux   • Ankle arthralgia   • Cardiac conduction disorder   • Closed fracture of distal fibula   • Arteriosclerosis of coronary artery   • Fracture of distal end of tibia   • Elevated cholesterol   • BP (high blood pressure)   • Hypertrophic polyarthritis   • OP (osteoporosis)   • Right heart failure (HCC)   • Heart failure, chronic, right-sided (HCC)   • SOB (shortness of breath)   • Palpitations   • CHF (congestive heart failure) (HCC)   • Congestive heart failure (HCC)   • Precordial pain   • Chronic anticoagulation   • Hypokalemia   • Anemia, unspecified type   • Generalized weakness     Past Medical History:   Diagnosis Date   • Anemia    • Arthritis    • CHF (congestive heart failure) (HCC)    • Chronic kidney disease    • Coronary artery disease    • H/O blood clots    • History of transfusion     no reaction    • Hypertension      Past Surgical History:   Procedure Laterality Date   • ABDOMINAL SURGERY     • APPENDECTOMY     • CARDIAC SURGERY     •  SECTION     • COLONOSCOPY N/A 3/7/2018    Procedure: COLONOSCOPY;  Surgeon: Willie Gresham MD;  Location:  [facility];  Service:    • CORONARY ANGIOPLASTY WITH STENT PLACEMENT     • ENDOSCOPY N/A 3/6/2018    Procedure: ESOPHAGOGASTRODUODENOSCOPY;  Surgeon: Willie Gresham MD;  Location:  [facility];  Service:    • PACEMAKER IMPLANTATION      left side    • SKIN BIOPSY     • TUBAL ABDOMINAL LIGATION       PT Assessment (last 12 hours)     PT Evaluation and Treatment     Row Name 22 1250          Physical Therapy Time and Intention    Subjective Information complains of;fatigue;nausea/vomiting  -CT     Document Type therapy note (daily note)  -CT     Mode of  Treatment physical therapy  -CT     Patient Effort good  -CT     Comment Pts treatment session [id number]. Pelon Dorsey via [employer]. Pt c/o nausea this date but agreeable to participate with therapy. Pt ambulated to/from [facility] 5 ft x 2. Pt performed seated ther ex in chair.  -CT     Row Name 12/14/22 1250          General Information    Patient Profile Reviewed yes  -CT     Row Name 12/14/22 1250          Cognition    Affect/Mental Status (Cognition) WFL  -CT     Follows Commands (Cognition) WFL  -CT     Row Name 12/14/22 1250          Bed Mobility    Comment, (Bed Mobility) pt up in chair  -CT     Row Name 12/14/22 1250          Transfers    Transfers sit-stand transfer;stand-sit transfer  -CT     Row Name 12/14/22 1250          Sit-Stand Transfer    Sit-Stand [place] (Transfers) moderate assist (50% patient effort)  -CT     Assistive Device (Sit-Stand Transfers) walker, front-wheeled  -CT     Row Name 12/14/22 1250          Stand-Sit Transfer    Stand-Sit [place] (Transfers) moderate assist (50% patient effort)  with verbal cueing to reach back for chair  -CT     Assistive Device (Stand-Sit Transfers) walker, front-wheeled  -CT     Row Name 12/14/22 1250          Gait/Stairs (Locomotion)    [place] Level (Gait) moderate assist (50% patient effort);2 person assist;verbal cues;nonverbal cues (demo/gesture)  -CT     Assistive Device (Gait) walker, front-wheeled  -CT     Distance in Feet (Gait) 5 ft x 2  -CT     Pattern (Gait) step-to  -CT     Deviations/Abnormal Patterns (Gait) stride length decreased;gait speed decreased;festinating/shuffling;weight shifting decreased  -CT     Bilateral Gait Deviations weight shift ability decreased;forward flexed posture;knee buckling bilaterally  -CT     Row Name 12/14/22 1250          Motor Skills    Therapeutic Exercise --  BLE seated ther ex: seated marches, LAQ, AP/HR, hip abd/add, glut sets 25 reps  -CT     Row Name 12/14/22 1250          Coping    Observed Emotional  State calm;cooperative;pleasant  -CT     Verbalized Emotional State acceptance  -CT     Row Name 12/14/22 1250          Plan of Care Review    Plan of Care Reviewed With patient  -CT     Row Name 12/14/22 1250          Positioning and Restraints    Pre-Treatment Position sitting in chair/recliner  -CT     Post Treatment Position chair  -CT     In Chair sitting;call light within reach;encouraged to call for assist;notified nsg  -CT           User Key  (r) = Recorded By, (t) = Taken By, (c) = Cosigned By    Initials Name Provider Type    CT Bhargavi Vásquez PT Physical Therapist                Physical Therapy Education     Title: PT OT SLP Therapies (Done)     Topic: Physical Therapy (Done)     Point: Mobility training (Done)     Learning Progress Summary           Patient Acceptance, E,TB, VU by AC at 12/14/2022 1503    Acceptance, E,TB, VU by DL at 12/13/2022 0240    Acceptance, D,E, VU,NR by LL at 12/12/2022 1455    Acceptance, E,TB, VU by RW at 12/9/2022 1451    Acceptance, E,TB, VU by BC at 11/27/2022 1319    Acceptance, E,TB, VU by RM at 11/20/2022 1449    Acceptance, E,TB, VU by RM at 11/18/2022 1021    Acceptance, E,TB, VU by RH at 11/16/2022 1110    Acceptance, E,TB, VU by KK at 11/14/2022 1638    Acceptance, E,TB, VU by KK at 11/13/2022 1648    Acceptance, E,TB, VU by KG at 11/10/2022 1827    Acceptance, E,TB, VU by DM at 11/9/2022 1638    Acceptance, E,TB, VU by KK at 11/8/2022 1754    Acceptance, E,TB, VU by KK at 11/7/2022 1635    Acceptance, E,D, VU,NR by LL at 11/5/2022 1325    Acceptance, E, VU by EB at 11/4/2022 1448    Acceptance, E,TB, VU by KM at 11/4/2022 1101   Family Acceptance, D,E, VU,NR by LL at 12/12/2022 1455                   Point: Home exercise program (Done)     Learning Progress Summary           Patient Acceptance, E,TB, VU by AC at 12/14/2022 1503    Acceptance, E,TB, VU by DL at 12/13/2022 0240    Acceptance, D,E, VU,NR by LL at 12/12/2022 1455    Acceptance, E,TB, VU by RW at  12/9/2022 1451    Acceptance, E,TB, VU by BC at 11/27/2022 1319    Acceptance, E,TB, VU by RM at 11/20/2022 1449    Acceptance, E,TB, VU by RM at 11/18/2022 1021    Acceptance, E,TB, VU by RH at 11/16/2022 1110    Acceptance, E,TB, VU by KK at 11/14/2022 1638    Acceptance, E,TB, VU by KK at 11/13/2022 1648    Acceptance, E,TB, VU by KG at 11/10/2022 1827    Acceptance, E,TB, VU by DM at 11/9/2022 1638    Acceptance, E,TB, VU by KK at 11/8/2022 1754    Acceptance, E,TB, VU by KK at 11/7/2022 1635    Acceptance, E,D, VU,NR by LL at 11/5/2022 1325    Acceptance, E, VU by EB at 11/4/2022 1448    Acceptance, E,TB, VU by KM at 11/4/2022 1101   Family Acceptance, D,E, VU,NR by LL at 12/12/2022 1455                   Point: Body mechanics (Done)     Learning Progress Summary           Patient Acceptance, E,TB, VU by AC at 12/14/2022 1503    Acceptance, E,TB, VU by DL at 12/13/2022 0240    Acceptance, D,E, VU,NR by LL at 12/12/2022 1455    Acceptance, E,TB, VU by RW at 12/9/2022 1451    Acceptance, E,TB, VU by BC at 11/27/2022 1319    Acceptance, E,TB, VU by RM at 11/20/2022 1449    Acceptance, E,TB, VU by RM at 11/18/2022 1021    Acceptance, E,TB, VU by RH at 11/16/2022 1110    Acceptance, E,TB, VU by KK at 11/14/2022 1638    Acceptance, E,TB, VU by KK at 11/13/2022 1648    Acceptance, E,TB, VU by KG at 11/10/2022 1827    Acceptance, E,TB, VU by DM at 11/9/2022 1638    Acceptance, E,TB, VU by KK at 11/8/2022 1754    Acceptance, E,TB, VU by KK at 11/7/2022 1635    Acceptance, E,D, VU,NR by LL at 11/5/2022 1325    Acceptance, E, VU by EB at 11/4/2022 1448    Acceptance, E,TB, VU by KM at 11/4/2022 1101   Family Acceptance, D,E, VU,NR by LL at 12/12/2022 1455                   Point: Precautions (Done)     Learning Progress Summary           Patient Acceptance, E,TB, VU by AC at 12/14/2022 1503    Acceptance, E,TB, VU by DL at 12/13/2022 0240    Acceptance, D,E, VU,NR by LL at 12/12/2022 1455    Acceptance, E,TB, VU by RW at  12/9/2022 1451    Acceptance, E,TB, VU by BC at 11/27/2022 1319    Acceptance, E,TB, VU by RM at 11/20/2022 1449    Acceptance, E,TB, VU by RM at 11/18/2022 1021    Acceptance, E,TB, VU by RH at 11/16/2022 1110    Acceptance, E,TB, VU by KK at 11/14/2022 1638    Acceptance, E,TB, VU by KK at 11/13/2022 1648    Acceptance, E,TB, VU by KG at 11/10/2022 1827    Acceptance, E,TB, VU by DM at 11/9/2022 1638    Acceptance, E,TB, VU by KK at 11/8/2022 1754    Acceptance, E,TB, VU by KK at 11/7/2022 1635    Acceptance, E,D, VU,NR by LL at 11/5/2022 1325    Acceptance, E, VU by EB at 11/4/2022 1448    Acceptance, E,TB, VU by KM at 11/4/2022 1101   Family Acceptance, D,E, VU,NR by LL at 12/12/2022 1455                               User Key     Initials Effective Dates Name Provider Type Discipline    LL 05/02/16 -  Neda Garcia PTA Physical Therapist Assistant PT    RM 06/14/22 -  Bethany Krause, RN Registered Nurse Nurse    DM 06/16/21 -  Moniz, Deborah, RN Registered Nurse Nurse    BC 08/02/22 -  Collett, Brittany, RN Registered Nurse Nurse    AC 10/13/22 -  Jaya Castillo, SONAM Registered Nurse Nurse    KM 05/24/22 -  Mao Vázquez, PT Physical Therapist PT    EB 09/22/22 -  Cedric Oliver, RN Registered Nurse Nurse    KK 06/27/22 -  Chelsi Bolivar, RN Registered Nurse Nurse    DL 03/16/22 -  Francy Garcia, RN Registered Nurse Nurse    KG 09/22/22 -  Verónica Rodriguez, RN Registered Nurse Nurse    RW 09/22/22 -  Bethany Schofield, RN Registered Nurse Nurse    RH 08/03/22 -  Jung Russell RN Extern Registered Nurse Nurse              PT Recommendation and Plan  Anticipated Discharge Disposition (PT): home with 24/7 care, skilled nursing facility  Planned Therapy Interventions (PT): balance training, bed mobility training, gait training, home exercise program, manual therapy techniques, motor coordination training, neuromuscular re-education, patient/family education, postural re-education, strengthening, transfer  training  Therapy Frequency (PT): 5 times/wk (5-6 times/wk)  Progress Summary (PT)  Progress Toward Functional Goals (PT): progress toward functional goals is gradual  Plan of Care Reviewed With: patient  Progress: improving   Outcome Measures     Row Name 12/14/22 1356 12/12/22 1448          How much help from another person do you currently need...    Turning from your back to your side while in flat bed without using bedrails? -- 3  -LL     Moving from lying on back to sitting on the side of a flat bed without bedrails? -- 3  -LL     Moving to and from a bed to a chair (including a wheelchair)? -- 2  -LL     Standing up from a chair using your arms (e.g., wheelchair, bedside chair)? -- 2  -LL     Climbing 3-5 steps with a railing? -- 1  -LL     To walk in hospital room? -- 2  -LL     AM-PAC 6 Clicks Score (PT) -- 13  -LL        How much help from another is currently needed...    Putting on and taking off regular lower body clothing? 2  -KP 2  -KP     Bathing (including washing, rinsing, and drying) 3  -KP 2  -KP     Toileting (which includes using toilet bed pan or urinal) 3  -KP 3  -KP     Putting on and taking off regular upper body clothing 3  -KP 3  -KP     Taking care of personal grooming (such as brushing teeth) 3  -KP 3  -KP     Eating meals 4  -KP 4  -KP     AM-PAC 6 Clicks Score (OT) 18  -KP 17  -KP        Functional Assessment    Outcome Measure Options -- AM-PAC 6 Clicks Basic Mobility (PT)  -LL           User Key  (r) = Recorded By, (t) = Taken By, (c) = Cosigned By    Initials Name Provider Type    LL Neda Garcia, PTA Physical Therapist Assistant    Linn Richardson, OT Occupational Therapist                 Time Calculation:    PT Charges     Row Name 12/14/22 1534             Time Calculation    Start Time 1250  -CT      Stop Time 1330  -CT      Time Calculation (min) 40 min  -CT      PT Received On 12/14/22  -CT            User Key  (r) = Recorded By, (t) = Taken By, (c) = Cosigned By     Initials Name Provider Type    CT Bhargavi Vásquez, PT Physical Therapist              Therapy Charges for Today     Code Description Service Date Service Provider Modifiers Qty    33829600916 HC GAIT TRAINING EA 15 MIN 12/13/2022 Bhargavi Vásquez, PT GP 1    28528488463 HC PT THER PROC EA 15 MIN 12/13/2022 Bhargavi Vásquez, PT GP 1    85856322173 HC PT THERAPEUTIC ACT EA 15 MIN 12/13/2022 Bhargavi Vásquez, PT GP 1    77716702410 HC GAIT TRAINING EA 15 MIN 12/14/2022 Bhargavi Vásquez, PT GP 1    04796471923 HC PT THER PROC EA 15 MIN 12/14/2022 Bhargavi Vásquez, PT GP 1          PT G-Codes  Outcome Measure Options: AM-PAC 6 Clicks Daily Activity (OT)  AM-PAC 6 Clicks Score (PT): 13  AM-PAC 6 Clicks Score (OT): 18    Bhargavi Vásquez, PT  12/14/2022

## 2022-12-15 PROCEDURE — 97110 THERAPEUTIC EXERCISES: CPT

## 2022-12-15 PROCEDURE — 97116 GAIT TRAINING THERAPY: CPT

## 2022-12-15 PROCEDURE — 97530 THERAPEUTIC ACTIVITIES: CPT

## 2022-12-15 RX ADMIN — PANTOPRAZOLE SODIUM 40 MG: 40 TABLET, DELAYED RELEASE ORAL at 05:33

## 2022-12-15 RX ADMIN — DICLOFENAC 4 G: 10 GEL TOPICAL at 21:18

## 2022-12-15 RX ADMIN — HYDRALAZINE HYDROCHLORIDE 5 MG: 25 TABLET, FILM COATED ORAL at 05:33

## 2022-12-15 RX ADMIN — METOPROLOL TARTRATE 25 MG: 25 TABLET, FILM COATED ORAL at 08:01

## 2022-12-15 RX ADMIN — HYDRALAZINE HYDROCHLORIDE 5 MG: 25 TABLET, FILM COATED ORAL at 14:20

## 2022-12-15 RX ADMIN — DOCUSATE SODIUM 100 MG: 100 CAPSULE ORAL at 21:17

## 2022-12-15 RX ADMIN — Medication 10 ML: at 21:17

## 2022-12-15 RX ADMIN — DICLOFENAC 4 G: 10 GEL TOPICAL at 08:02

## 2022-12-15 RX ADMIN — DICLOFENAC 4 G: 10 GEL TOPICAL at 17:46

## 2022-12-15 RX ADMIN — Medication 1 TABLET: at 08:01

## 2022-12-15 RX ADMIN — ESCITALOPRAM 10 MG: 10 TABLET, FILM COATED ORAL at 08:01

## 2022-12-15 RX ADMIN — DOCUSATE SODIUM 100 MG: 100 CAPSULE ORAL at 08:01

## 2022-12-15 RX ADMIN — FUROSEMIDE 40 MG: 40 TABLET ORAL at 17:46

## 2022-12-15 RX ADMIN — APIXABAN 5 MG: 5 TABLET, FILM COATED ORAL at 21:17

## 2022-12-15 RX ADMIN — MAGNESIUM GLUCONATE 500 MG ORAL TABLET 400 MG: 500 TABLET ORAL at 08:01

## 2022-12-15 RX ADMIN — GABAPENTIN 300 MG: 300 CAPSULE ORAL at 21:17

## 2022-12-15 RX ADMIN — TAMSULOSIN HYDROCHLORIDE 0.4 MG: 0.4 CAPSULE ORAL at 08:01

## 2022-12-15 RX ADMIN — FUROSEMIDE 40 MG: 40 TABLET ORAL at 08:01

## 2022-12-15 RX ADMIN — HYDRALAZINE HYDROCHLORIDE 5 MG: 25 TABLET, FILM COATED ORAL at 21:17

## 2022-12-15 RX ADMIN — Medication 10 MG: at 21:18

## 2022-12-15 RX ADMIN — APIXABAN 5 MG: 5 TABLET, FILM COATED ORAL at 08:01

## 2022-12-15 RX ADMIN — METOPROLOL TARTRATE 25 MG: 25 TABLET, FILM COATED ORAL at 21:16

## 2022-12-15 NOTE — PLAN OF CARE
Goal Outcome Evaluation:  Plan of Care Reviewed With: patient        Progress: improving  Outcome Evaluation: Pt has had a good day. Pt walked in the hallway with therapy. Pts swan was removed and states she wants to be home by [place]. Will continue plan of care.

## 2022-12-15 NOTE — PLAN OF CARE
Goal Outcome Evaluation:  Plan of Care Reviewed With: patient     Pt is resting in bed with eyes closed. Chest is rising and falling evenly. No s/s of acute distress noted. No complaints of chest pain or nausea.

## 2022-12-15 NOTE — THERAPY TREATMENT NOTE
Acute Care - Physical Therapy Treatment Note   Scot     Patient Name: Tre Kinney  : 1932  MRN: [id number]  Today's Date: 12/15/2022   Onset of Illness/Injury or Date of Surgery: 10/06/22  Visit Dx:   No diagnosis found.  Patient Active Problem List   Diagnosis   • Acid reflux   • Ankle arthralgia   • Cardiac conduction disorder   • Closed fracture of distal fibula   • Arteriosclerosis of coronary artery   • Fracture of distal end of tibia   • Elevated cholesterol   • BP (high blood pressure)   • Hypertrophic polyarthritis   • OP (osteoporosis)   • Right heart failure (HCC)   • Heart failure, chronic, right-sided (HCC)   • SOB (shortness of breath)   • Palpitations   • CHF (congestive heart failure) (HCC)   • Congestive heart failure (HCC)   • Precordial pain   • Chronic anticoagulation   • Hypokalemia   • Anemia, unspecified type   • Generalized weakness     Past Medical History:   Diagnosis Date   • Anemia    • Arthritis    • CHF (congestive heart failure) (HCC)    • Chronic kidney disease    • Coronary artery disease    • H/O blood clots    • History of transfusion     no reaction    • Hypertension      Past Surgical History:   Procedure Laterality Date   • ABDOMINAL SURGERY     • APPENDECTOMY     • CARDIAC SURGERY     •  SECTION     • COLONOSCOPY N/A 3/7/2018    Procedure: COLONOSCOPY;  Surgeon: Willie Gresham MD;  Location:  [facility];  Service:    • CORONARY ANGIOPLASTY WITH STENT PLACEMENT     • ENDOSCOPY N/A 3/6/2018    Procedure: ESOPHAGOGASTRODUODENOSCOPY;  Surgeon: Willie Gresham MD;  Location:  [facility];  Service:    • PACEMAKER IMPLANTATION      left side    • SKIN BIOPSY     • TUBAL ABDOMINAL LIGATION       PT Assessment (last 12 hours)     PT Evaluation and Treatment     Row Name 12/15/22 1330          Physical Therapy Time and Intention    Subjective Information no complaints  -LL     Document Type therapy note (daily note)  -LL     Mode of Treatment physical  therapy;individual therapy  -LL     Patient Effort good  -LL     Comment Patient seen for PT this date 13:30-14:10 with son, Leno, present and son, Willian, on facetime.  Patient performed LE ex's in sitting and with red theraband, performed sit <> stand from recliner x 3 reps with cues for proper hand placement and ambulated 20', 22', 35'.  Patient required frequent rest breaks.  -LL     Row Name 12/15/22 1330          General Information    Patient Profile Reviewed yes  -LL     Row Name 12/15/22 1330          Cognition    Affect/Mental Status (Cognition) WFL  -LL     Orientation Status (Cognition) oriented x 4  -LL     Follows Commands (Cognition) WFL  -LL     Personal Safety Interventions fall prevention program maintained;gait belt;nonskid shoes/slippers when out of bed;supervised activity  -LL     Row Name 12/15/22 1330          Bed Mobility    Comment, (Bed Mobility) Patient UIC  -LL     Row Name 12/15/22 1330          Transfers    Transfers sit-stand transfer;stand-sit transfer  -LL     Row Name 12/15/22 1330          Sit-Stand Transfer    Sit-Stand [place] (Transfers) minimum assist (75% patient effort);verbal cues  For proper hand placement  -LL     Assistive Device (Sit-Stand Transfers) walker, front-wheeled  -LL     Comment, (Sit-Stand Transfer) x 3 reps  -LL     Row Name 12/15/22 1330          Stand-Sit Transfer    Stand-Sit [place] (Transfers) moderate assist (50% patient effort);minimum assist (75% patient effort)  with verbal cueing to reach back for chair  -LL     Assistive Device (Stand-Sit Transfers) walker, front-wheeled  -LL     Comment, (Stand-Sit Transfer) x 3 reps  -LL     Row Name 12/15/22 1330          Gait/Stairs (Locomotion)    [place] Level (Gait) moderate assist (50% patient effort);verbal cues;nonverbal cues (demo/gesture);minimum assist (75% patient effort);1 person assist;1 person to manage equipment  -LL     Assistive Device (Gait) walker, front-wheeled  -LL     Distance  in Feet (Gait) 20', 22', 35'  -LL     Pattern (Gait) step-to  -LL     Deviations/Abnormal Patterns (Gait) stride length decreased;gait speed decreased;festinating/shuffling;weight shifting decreased  -LL     Bilateral Gait Deviations weight shift ability decreased;forward flexed posture  -LL     Row Name 12/15/22 1330          Hip (Therapeutic Exercise)    Hip Strengthening (Therapeutic Exercise) bilateral;flexion;extension;aBduction;aDduction;marching while seated;sitting;resistance band;red  GS  -LL     Row Name 12/15/22 1330          Knee (Therapeutic Exercise)    Knee Strengthening (Therapeutic Exercise) bilateral;flexion;extension;marching while seated;LAQ (long arc quad);hamstring curls;sitting;resistance band;red  -LL     Row Name 12/15/22 1330          Ankle (Therapeutic Exercise)    Ankle Strengthening (Therapeutic Exercise) bilateral;dorsiflexion;plantarflexion;sitting  -LL     Row Name 12/15/22 1330          Coping    Observed Emotional State calm;cooperative;pleasant  -LL     Verbalized Emotional State acceptance  -LL     Row Name 12/15/22 1330          Positioning and Restraints    Pre-Treatment Position sitting in chair/recliner  -LL     Post Treatment Position chair  -LL     In Chair sitting;call light within reach;encouraged to call for assist;with family/caregiver  With tray table in front of her  -LL           User Key  (r) = Recorded By, (t) = Taken By, (c) = Cosigned By    Initials Name Provider Type    Neda Kyle PTA Physical Therapist Assistant                Physical Therapy Education     Title: PT OT SLP Therapies (Done)     Topic: Physical Therapy (Done)     Point: Mobility training (Done)     Learning Progress Summary           Patient Acceptance, E,TB, VU by AC at 12/14/2022 1503    Acceptance, E,TB, VU by DL at 12/13/2022 0240    Acceptance, D,E, VU,NR by LL at 12/12/2022 1455    Acceptance, E,TB, VU by RW at 12/9/2022 1451    Acceptance, E,TB, VU by BC at 11/27/2022 1319     Acceptance, E,TB, VU by RM at 11/20/2022 1449    Acceptance, E,TB, VU by RM at 11/18/2022 1021    Acceptance, E,TB, VU by RH at 11/16/2022 1110    Acceptance, E,TB, VU by KK at 11/14/2022 1638    Acceptance, E,TB, VU by KK at 11/13/2022 1648    Acceptance, E,TB, VU by KG at 11/10/2022 1827    Acceptance, E,TB, VU by DM at 11/9/2022 1638    Acceptance, E,TB, VU by KK at 11/8/2022 1754    Acceptance, E,TB, VU by KK at 11/7/2022 1635    Acceptance, E,D, VU,NR by LL at 11/5/2022 1325    Acceptance, E, VU by EB at 11/4/2022 1448    Acceptance, E,TB, VU by KM at 11/4/2022 1101   Family Acceptance, D,E, VU,NR by LL at 12/12/2022 1455                   Point: Home exercise program (Done)     Learning Progress Summary           Patient Acceptance, E,TB, VU by AC at 12/14/2022 1503    Acceptance, E,TB, VU by DL at 12/13/2022 0240    Acceptance, D,E, VU,NR by LL at 12/12/2022 1455    Acceptance, E,TB, VU by RW at 12/9/2022 1451    Acceptance, E,TB, VU by BC at 11/27/2022 1319    Acceptance, E,TB, VU by RM at 11/20/2022 1449    Acceptance, E,TB, VU by RM at 11/18/2022 1021    Acceptance, E,TB, VU by RH at 11/16/2022 1110    Acceptance, E,TB, VU by KK at 11/14/2022 1638    Acceptance, E,TB, VU by KK at 11/13/2022 1648    Acceptance, E,TB, VU by KG at 11/10/2022 1827    Acceptance, E,TB, VU by DM at 11/9/2022 1638    Acceptance, E,TB, VU by KK at 11/8/2022 1754    Acceptance, E,TB, VU by KK at 11/7/2022 1635    Acceptance, E,D, VU,NR by LL at 11/5/2022 1325    Acceptance, E, VU by EB at 11/4/2022 1448    Acceptance, E,TB, VU by KM at 11/4/2022 1101   Family Acceptance, D,E, VU,NR by LL at 12/12/2022 1455                   Point: Body mechanics (Done)     Learning Progress Summary           Patient Acceptance, E,TB, VU by AC at 12/14/2022 1503    Acceptance, E,TB, VU by DL at 12/13/2022 0240    Acceptance, D,E, VU,NR by LL at 12/12/2022 1455    Acceptance, E,TB, VU by RW at 12/9/2022 1451    Acceptance, E,TB, VU by BC at  11/27/2022 1319    Acceptance, E,TB, VU by RM at 11/20/2022 1449    Acceptance, E,TB, VU by RM at 11/18/2022 1021    Acceptance, E,TB, VU by RH at 11/16/2022 1110    Acceptance, E,TB, VU by KK at 11/14/2022 1638    Acceptance, E,TB, VU by KK at 11/13/2022 1648    Acceptance, E,TB, VU by KG at 11/10/2022 1827    Acceptance, E,TB, VU by DM at 11/9/2022 1638    Acceptance, E,TB, VU by KK at 11/8/2022 1754    Acceptance, E,TB, VU by KK at 11/7/2022 1635    Acceptance, E,D, VU,NR by LL at 11/5/2022 1325    Acceptance, E, VU by EB at 11/4/2022 1448    Acceptance, E,TB, VU by KM at 11/4/2022 1101   Family Acceptance, D,E, VU,NR by LL at 12/12/2022 1455                   Point: Precautions (Done)     Learning Progress Summary           Patient Acceptance, E,TB, VU by AC at 12/14/2022 1503    Acceptance, E,TB, VU by DL at 12/13/2022 0240    Acceptance, D,E, VU,NR by LL at 12/12/2022 1455    Acceptance, E,TB, VU by RW at 12/9/2022 1451    Acceptance, E,TB, VU by BC at 11/27/2022 1319    Acceptance, E,TB, VU by RM at 11/20/2022 1449    Acceptance, E,TB, VU by RM at 11/18/2022 1021    Acceptance, E,TB, VU by RH at 11/16/2022 1110    Acceptance, E,TB, VU by KK at 11/14/2022 1638    Acceptance, E,TB, VU by KK at 11/13/2022 1648    Acceptance, E,TB, VU by KG at 11/10/2022 1827    Acceptance, E,TB, VU by DM at 11/9/2022 1638    Acceptance, E,TB, VU by KK at 11/8/2022 1754    Acceptance, E,TB, VU by KK at 11/7/2022 1635    Acceptance, E,D, VU,NR by LL at 11/5/2022 1325    Acceptance, E, VU by EB at 11/4/2022 1448    Acceptance, E,TB, VU by KM at 11/4/2022 1101   Family Acceptance, D,E, VU,NR by LL at 12/12/2022 1455                               User Key     Initials Effective Dates Name Provider Type Discipline     05/02/16 -  Neda Garcia PTA Physical Therapist Assistant PT    RM 06/14/22 -  Bethany Krause, RN Registered Nurse Nurse    DM 06/16/21 -  Moniz, Deborah, RN Registered Nurse Nurse    BC 08/02/22 -  Collett,  Lyn, RN Registered Nurse Nurse    AC 10/13/22 -  Jaya Castillo, RN Registered Nurse Nurse    KM 05/24/22 -  Mao Vázquez, PT Physical Therapist PT    EB 09/22/22 -  Cedric Oliver, RN Registered Nurse Nurse    KK 06/27/22 -  Chelsi Bolivar, RN Registered Nurse Nurse    DL 03/16/22 -  Francy Garcia, RN Registered Nurse Nurse    KG 09/22/22 -  Verónica Rodriguez, RN Registered Nurse Nurse    RW 09/22/22 -  Bethany Schofield RN Registered Nurse Nurse    RH 08/03/22 -  Jung Russell RN Extern Registered Nurse Nurse              PT Recommendation and Plan         Outcome Measures     Row Name 12/15/22 1600 12/15/22 1200 12/14/22 1809       How much help from another person do you currently need...    Turning from your back to your side while in flat bed without using bedrails? 3  -LL -- --    Moving from lying on back to sitting on the side of a flat bed without bedrails? 3  -LL -- --    Moving to and from a bed to a chair (including a wheelchair)? 2  -LL -- --    Standing up from a chair using your arms (e.g., wheelchair, bedside chair)? 2  -LL -- --    Climbing 3-5 steps with a railing? 1  -LL -- --    To walk in hospital room? 2  -LL -- --    AM-PAC 6 Clicks Score (PT) 13  -LL -- --       How much help from another is currently needed...    Putting on and taking off regular lower body clothing? -- 2  -LM 2  -KP    Bathing (including washing, rinsing, and drying) -- 3  -LM 3  -KP    Toileting (which includes using toilet bed pan or urinal) -- 3  -LM 3  -KP    Putting on and taking off regular upper body clothing -- 3  -LM 3  -KP    Taking care of personal grooming (such as brushing teeth) -- 3  -LM 3  -KP    Eating meals -- 4  -LM 4  -KP    AM-PAC 6 Clicks Score (OT) -- 18  -LM 18  -KP       Functional Assessment    Outcome Measure Options AM-PAC 6 Clicks Basic Mobility (PT)  -LL AM-PAC 6 Clicks Daily Activity (OT)  -LM --          User Key  (r) = Recorded By, (t) = Taken By, (c) = Cosigned By    Initials Name  Provider Type    LL Neda Garcia PTA Physical Therapist Assistant    Татьяна Mccabe, OT Occupational Therapist    Linn Richardson, VELASQUEZ Occupational Therapist                 Time Calculation:    PT Charges     Row Name 12/15/22 1600             Time Calculation    PT Received On 12/15/22  -LL         Time Calculation- PT    Total Timed Code Minutes- PT 40 minute(s)  -LL            User Key  (r) = Recorded By, (t) = Taken By, (c) = Cosigned By    Initials Name Provider Type    LL Neda Garcia PTA Physical Therapist Assistant              Therapy Charges for Today     Code Description Service Date Service Provider Modifiers Qty    60787771695 HC GAIT TRAINING EA 15 MIN 12/15/2022 Neda Garcia PTA GP, CQ 1    66042908868 HC PT THERAPEUTIC ACT EA 15 MIN 12/15/2022 Neda Garcia PTA GP, CQ 1    17616433862 HC PT THER PROC EA 15 MIN 12/15/2022 Neda Garcia PTA GP, CQ 1          PT G-Codes  Outcome Measure Options: AM-PAC 6 Clicks Basic Mobility (PT)  AM-PAC 6 Clicks Score (PT): 13  AM-PAC 6 Clicks Score (OT): 18    Hannah Garcia PTA  12/15/2022

## 2022-12-15 NOTE — THERAPY TREATMENT NOTE
Acute Care - Occupational Therapy Treatment Note   [place]     Patient Name: Tre Kinney  : 1932  MRN: [id number]  Today's Date: 12/15/2022  Onset of Illness/Injury or Date of Surgery: 10/06/22     Referring Physician: Janneth    Admit Date: 10/5/2022     No diagnosis found.  Patient Active Problem List   Diagnosis   • Acid reflux   • Ankle arthralgia   • Cardiac conduction disorder   • Closed fracture of distal fibula   • Arteriosclerosis of coronary artery   • Fracture of distal end of tibia   • Elevated cholesterol   • BP (high blood pressure)   • Hypertrophic polyarthritis   • OP (osteoporosis)   • Right heart failure (HCC)   • Heart failure, chronic, right-sided (HCC)   • SOB (shortness of breath)   • Palpitations   • CHF (congestive heart failure) (HCC)   • Congestive heart failure (HCC)   • Precordial pain   • Chronic anticoagulation   • Hypokalemia   • Anemia, unspecified type   • Generalized weakness     Past Medical History:   Diagnosis Date   • Anemia    • Arthritis    • CHF (congestive heart failure) ([facility])    • Chronic kidney disease    • Coronary artery disease    • H/O blood clots    • History of transfusion     no reaction    • Hypertension      Past Surgical History:   Procedure Laterality Date   • ABDOMINAL SURGERY     • APPENDECTOMY     • CARDIAC SURGERY     •  SECTION     • COLONOSCOPY N/A 3/7/2018    Procedure: COLONOSCOPY;  Surgeon: Willie Gresham MD;  Location:  [facility];  Service:    • CORONARY ANGIOPLASTY WITH STENT PLACEMENT     • ENDOSCOPY N/A 3/6/2018    Procedure: ESOPHAGOGASTRODUODENOSCOPY;  Surgeon: Willie Gresham MD;  Location:  [facility];  Service:    • PACEMAKER IMPLANTATION      left side    • SKIN BIOPSY     • TUBAL ABDOMINAL LIGATION           OT ASSESSMENT FLOWSHEET (last 12 hours)     OT Evaluation and Treatment     Row Name 12/15/22 1229                   OT Time and Intention    Subjective Information no complaints  -LM        Document Type  therapy note (daily note)  -LM        Mode of Treatment occupational therapy  -LM        Patient Effort adequate  -LM        Comment Patient seen this am from 10:40-11:05 for light bue arom/therex.  Patient performed arom tasks while seated in bedside recliner.  Patient completed light bue towel therex with frequent rest breaks.  Facetime with sonWillian briefly during session.  -LM           General Information    Existing Precautions/Restrictions fall  -LM           Cognition    Affect/Mental Status (Cognition) WFL  -LM        Orientation Status (Cognition) oriented x 4  -LM           Positioning and Restraints    Post Treatment Position chair  -LM        In Chair call light within reach;encouraged to call for assist  -LM              User Key  (r) = Recorded By, (t) = Taken By, (c) = Cosigned By    Initials Name Effective Dates    LM Татьяна Bah, OT 06/16/21 -                        OT Recommendation and Plan           Outcome Measures     Row Name 12/15/22 1200 12/14/22 1356 12/12/22 0598       How much help from another person do you currently need...    Turning from your back to your side while in flat bed without using bedrails? -- -- 3  -LL    Moving from lying on back to sitting on the side of a flat bed without bedrails? -- -- 3  -LL    Moving to and from a bed to a chair (including a wheelchair)? -- -- 2  -LL    Standing up from a chair using your arms (e.g., wheelchair, bedside chair)? -- -- 2  -LL    Climbing 3-5 steps with a railing? -- -- 1  -LL    To walk in hospital room? -- -- 2  -LL    AM-PAC 6 Clicks Score (PT) -- -- 13  -LL       How much help from another is currently needed...    Putting on and taking off regular lower body clothing? 2  -LM 2  -KP 2  -KP    Bathing (including washing, rinsing, and drying) 3  -LM 3  -KP 2  -KP    Toileting (which includes using toilet bed pan or urinal) 3  -LM 3  -KP 3  -KP    Putting on and taking off regular upper body clothing 3  -LM 3  -KP 3  -KP     Taking care of personal grooming (such as brushing teeth) 3  -LM 3  -KP 3  -KP    Eating meals 4  -LM 4  -KP 4  -KP    AM-PAC 6 Clicks Score (OT) 18  -LM 18  -KP 17  -KP       Functional Assessment    Outcome Measure Options AM-PAC 6 Clicks Daily Activity (OT)  -LM -- AM-PAC 6 Clicks Basic Mobility (PT)  -          User Key  (r) = Recorded By, (t) = Taken By, (c) = Cosigned By    Initials Name Provider Type     Neda Garcia, NACHO Physical Therapist Assistant     Татьяна Bah, OT Occupational Therapist    Linn Richardson, OT Occupational Therapist                Time Calculation:    Time Calculation- OT     Row Name 12/15/22 1233             Time Calculation- OT    Total Timed Code Minutes- OT 25 minute(s)  -            User Key  (r) = Recorded By, (t) = Taken By, (c) = Cosigned By    Initials Name Provider Type     Татьяна Bah, OT Occupational Therapist              Therapy Charges for Today     Code Description Service Date Service Provider Modifiers Qty    11756441355  OT THER PROC EA 15 MIN 12/15/2022 Татьяна Bah, OT GO 2               Татьяна Bah OT  12/15/2022

## 2022-12-15 NOTE — PROGRESS NOTES
[facility] HOSPITALISTS CROSS COVER NOTE    Patient Identification:  Name:  Tre Kinney  Age:  89 y.o.  Sex:  female  :  1932  MRN:  [id number]  Visit number:  [id number]  Primary Care Provider:  Jessica Pedro APRN    Length of stay in inpatient status:  71    Brief Update     I saw the patient today while she was sitting in her chair that was at bedside.  The patient states that she feels all right today.  She denies chest pain, trouble breathing, coughing, nausea, vomiting, and diarrhea.  She is having bowel movements but she did not have any encopresis today.  Patient thinks that her abdomen has decreased in size.  Patient was wanting to try to remove her Rodriguez catheter.  Sometimes, urinary retention can develop because severe constipation to kink in the urethra and prevent urine from coming out effectively, thus also increasing the patient's risk of recurrent UTIs.  Therefore, we will take her Rodriguez catheter out today per her request and we will see if she can urinate on her own.  We will continue with the bowel regimen to get the constipation resolved.  We await the results of the uphill that the family submitted to the insurance company.  Patient's blood pressures are controlled today and thus we will not obtain blood work tomorrow.    Jannet Lagunas MD  [facility]  12/15/22  17:20 EST

## 2022-12-16 PROCEDURE — 97110 THERAPEUTIC EXERCISES: CPT

## 2022-12-16 PROCEDURE — 97116 GAIT TRAINING THERAPY: CPT

## 2022-12-16 PROCEDURE — 63710000001 PROCHLORPERAZINE MALEATE PER 5 MG: Performed by: INTERNAL MEDICINE

## 2022-12-16 PROCEDURE — 97530 THERAPEUTIC ACTIVITIES: CPT

## 2022-12-16 RX ADMIN — GABAPENTIN 300 MG: 300 CAPSULE ORAL at 20:21

## 2022-12-16 RX ADMIN — METOPROLOL TARTRATE 25 MG: 25 TABLET, FILM COATED ORAL at 08:07

## 2022-12-16 RX ADMIN — APIXABAN 5 MG: 5 TABLET, FILM COATED ORAL at 20:21

## 2022-12-16 RX ADMIN — DICLOFENAC 4 G: 10 GEL TOPICAL at 18:20

## 2022-12-16 RX ADMIN — MAGNESIUM GLUCONATE 500 MG ORAL TABLET 400 MG: 500 TABLET ORAL at 08:08

## 2022-12-16 RX ADMIN — HYDRALAZINE HYDROCHLORIDE 5 MG: 25 TABLET, FILM COATED ORAL at 14:42

## 2022-12-16 RX ADMIN — PROCHLORPERAZINE MALEATE 5 MG: 5 TABLET ORAL at 10:46

## 2022-12-16 RX ADMIN — TAMSULOSIN HYDROCHLORIDE 0.4 MG: 0.4 CAPSULE ORAL at 08:07

## 2022-12-16 RX ADMIN — ESCITALOPRAM 10 MG: 10 TABLET, FILM COATED ORAL at 08:08

## 2022-12-16 RX ADMIN — Medication 10 MG: at 20:21

## 2022-12-16 RX ADMIN — FUROSEMIDE 40 MG: 40 TABLET ORAL at 08:07

## 2022-12-16 RX ADMIN — PANTOPRAZOLE SODIUM 40 MG: 40 TABLET, DELAYED RELEASE ORAL at 05:23

## 2022-12-16 RX ADMIN — FUROSEMIDE 40 MG: 40 TABLET ORAL at 18:25

## 2022-12-16 RX ADMIN — Medication 1 TABLET: at 08:07

## 2022-12-16 RX ADMIN — DICLOFENAC 4 G: 10 GEL TOPICAL at 20:21

## 2022-12-16 RX ADMIN — APIXABAN 5 MG: 5 TABLET, FILM COATED ORAL at 08:08

## 2022-12-16 RX ADMIN — HYDRALAZINE HYDROCHLORIDE 5 MG: 25 TABLET, FILM COATED ORAL at 05:23

## 2022-12-16 RX ADMIN — DOCUSATE SODIUM 100 MG: 100 CAPSULE ORAL at 08:07

## 2022-12-16 RX ADMIN — HYDRALAZINE HYDROCHLORIDE 5 MG: 25 TABLET, FILM COATED ORAL at 21:02

## 2022-12-16 RX ADMIN — DOCUSATE SODIUM 100 MG: 100 CAPSULE ORAL at 20:21

## 2022-12-16 RX ADMIN — METOPROLOL TARTRATE 25 MG: 25 TABLET, FILM COATED ORAL at 20:21

## 2022-12-16 NOTE — CASE MANAGEMENT/SOCIAL WORK
I spoke with patients son Conor and he had received notification from [employer] that appeal was denied. He has filled letter of reconsideration awaiting insurance response.

## 2022-12-16 NOTE — CASE MANAGEMENT/SOCIAL WORK
Discharge Planning Assessment   [place]     Patient Name: Tre Kinney  MRN: [id number]  Today's Date: 12/16/2022    Admit Date: 10/5/2022    Plan: Pt was admitted to swing bed on 10/5/22. Letter of reconsideration is currently pending awaiting insurance response. SS to follow and assist as needed.   Discharge Plan     Row Name 12/16/22 1447       Plan    Plan Pt was admitted to swing bed on 10/5/22. Letter of reconsideration is currently pending awaiting insurance response. SS to follow and assist as needed.                XANDER Craig

## 2022-12-16 NOTE — THERAPY TREATMENT NOTE
Acute Care - Physical Therapy Treatment Note   Scot     Patient Name: Tre Kinney  : 1932  MRN: [id number]  Today's Date: 2022   Onset of Illness/Injury or Date of Surgery: 10/06/22  Visit Dx:   No diagnosis found.  Patient Active Problem List   Diagnosis   • Acid reflux   • Ankle arthralgia   • Cardiac conduction disorder   • Closed fracture of distal fibula   • Arteriosclerosis of coronary artery   • Fracture of distal end of tibia   • Elevated cholesterol   • BP (high blood pressure)   • Hypertrophic polyarthritis   • OP (osteoporosis)   • Right heart failure (HCC)   • Heart failure, chronic, right-sided (HCC)   • SOB (shortness of breath)   • Palpitations   • CHF (congestive heart failure) (HCC)   • Congestive heart failure (HCC)   • Precordial pain   • Chronic anticoagulation   • Hypokalemia   • Anemia, unspecified type   • Generalized weakness     Past Medical History:   Diagnosis Date   • Anemia    • Arthritis    • CHF (congestive heart failure) (HCC)    • Chronic kidney disease    • Coronary artery disease    • H/O blood clots    • History of transfusion     no reaction    • Hypertension      Past Surgical History:   Procedure Laterality Date   • ABDOMINAL SURGERY     • APPENDECTOMY     • CARDIAC SURGERY     •  SECTION     • COLONOSCOPY N/A 3/7/2018    Procedure: COLONOSCOPY;  Surgeon: Willie Gresham MD;  Location:  [facility];  Service:    • CORONARY ANGIOPLASTY WITH STENT PLACEMENT     • ENDOSCOPY N/A 3/6/2018    Procedure: ESOPHAGOGASTRODUODENOSCOPY;  Surgeon: Willie Gresham MD;  Location:  [facility];  Service:    • PACEMAKER IMPLANTATION      left side    • SKIN BIOPSY     • TUBAL ABDOMINAL LIGATION       PT Assessment (last 12 hours)     PT Evaluation and Treatment     Row Name 22 1559          Physical Therapy Time and Intention    Subjective Information no complaints  -LL     Document Type therapy note (daily note)  -LL     Mode of Treatment physical  therapy;individual therapy  -LL     Patient Effort good  -LL     Comment Patient seen for PT this date 13:40-14:25 for gait, transfers and LE strengthening ex's.  Son, Willian, on facetime throught part of PT session.  -LL     Row Name 12/16/22 1559          General Information    Patient Profile Reviewed yes  -LL     Row Name 12/16/22 1559          Cognition    Affect/Mental Status (Cognition) WFL  -LL     Orientation Status (Cognition) oriented x 4  -LL     Follows Commands (Cognition) WFL  -LL     Personal Safety Interventions fall prevention program maintained;gait belt;nonskid shoes/slippers when out of bed;supervised activity  -LL     Row Name 12/16/22 1559          Bed Mobility    Comment, (Bed Mobility) Patient UIC  -LL     Row Name 12/16/22 1559          Transfers    Transfers sit-stand transfer;stand-sit transfer  -LL     Row Name 12/16/22 1559          Sit-Stand Transfer    Sit-Stand [place] (Transfers) minimum assist (75% patient effort);verbal cues;moderate assist (50% patient effort)  For proper hand placement  -LL     Assistive Device (Sit-Stand Transfers) walker, front-wheeled  -LL     Comment, (Sit-Stand Transfer) x 3 reps  -LL     Row Name 12/16/22 1559          Stand-Sit Transfer    Stand-Sit [place] (Transfers) moderate assist (50% patient effort);minimum assist (75% patient effort)  with verbal cueing to reach back for chair  -LL     Assistive Device (Stand-Sit Transfers) walker, front-wheeled  -LL     Comment, (Stand-Sit Transfer) x 3 reps  -LL     Row Name 12/16/22 1558          Gait/Stairs (Locomotion)    [place] Level (Gait) moderate assist (50% patient effort);verbal cues;nonverbal cues (demo/gesture);minimum assist (75% patient effort);1 person assist;1 person to manage equipment  -LL     Assistive Device (Gait) walker, front-wheeled  -LL     Distance in Feet (Gait) 18', 20' x 2  -LL     Pattern (Gait) step-to  -LL     Deviations/Abnormal Patterns (Gait) stride length  decreased;gait speed decreased;festinating/shuffling;weight shifting decreased  -LL     Bilateral Gait Deviations weight shift ability decreased;forward flexed posture  -LL     Row Name 12/16/22 1559          Hip (Therapeutic Exercise)    Hip Strengthening (Therapeutic Exercise) bilateral;flexion;extension;aBduction;aDduction;marching while seated;sitting;resistance band;red  GS  -LL     Row Name 12/16/22 1559          Knee (Therapeutic Exercise)    Knee Strengthening (Therapeutic Exercise) bilateral;flexion;extension;marching while seated;LAQ (long arc quad);hamstring curls;sitting;resistance band;red  -LL     Row Name 12/16/22 1559          Ankle (Therapeutic Exercise)    Ankle Strengthening (Therapeutic Exercise) bilateral;dorsiflexion;plantarflexion;sitting  -LL     Row Name 12/16/22 1559          Coping    Observed Emotional State calm;cooperative;pleasant  -LL     Verbalized Emotional State acceptance  -LL     Row Name 12/16/22 1559          Positioning and Restraints    Pre-Treatment Position sitting in chair/recliner  -LL     Post Treatment Position chair  -LL     In Chair sitting;call light within reach;encouraged to call for assist  With tray table in front of her  -LL           User Key  (r) = Recorded By, (t) = Taken By, (c) = Cosigned By    Initials Name Provider Type    LL Neda Garcia PTA Physical Therapist Assistant                Physical Therapy Education     Title: PT OT SLP Therapies (Done)     Topic: Physical Therapy (Done)     Point: Mobility training (Done)     Learning Progress Summary           Patient Acceptance, E,D, VU,NR by  at 12/16/2022 1605    Acceptance, E,TB, VU by AC at 12/16/2022 1523    Acceptance, E,TB, VU by AC at 12/15/2022 1618    Acceptance, E,TB, VU by AC at 12/14/2022 1503    Acceptance, E,TB, VU by DL at 12/13/2022 0240    Acceptance, D,E, VU,NR by LL at 12/12/2022 1455    Acceptance, E,TB, VU by RW at 12/9/2022 1451    Acceptance, E,TB, VU by BC at 11/27/2022 1319     Acceptance, E,TB, VU by RM at 11/20/2022 1449    Acceptance, E,TB, VU by RM at 11/18/2022 1021    Acceptance, E,TB, VU by RH at 11/16/2022 1110    Acceptance, E,TB, VU by KK at 11/14/2022 1638    Acceptance, E,TB, VU by KK at 11/13/2022 1648    Acceptance, E,TB, VU by KG at 11/10/2022 1827    Acceptance, E,TB, VU by DM at 11/9/2022 1638    Acceptance, E,TB, VU by KK at 11/8/2022 1754    Acceptance, E,TB, VU by KK at 11/7/2022 1635    Acceptance, E,D, VU,NR by LL at 11/5/2022 1325    Acceptance, E, VU by EB at 11/4/2022 1448    Acceptance, E,TB, VU by KM at 11/4/2022 1101   Family Acceptance, D,E, VU,NR by LL at 12/12/2022 1455                   Point: Home exercise program (Done)     Learning Progress Summary           Patient Acceptance, E,D, VU,NR by LL at 12/16/2022 1605    Acceptance, E,TB, VU by AC at 12/16/2022 1523    Acceptance, E,TB, VU by AC at 12/15/2022 1618    Acceptance, E,TB, VU by AC at 12/14/2022 1503    Acceptance, E,TB, VU by DL at 12/13/2022 0240    Acceptance, D,E, VU,NR by LL at 12/12/2022 1455    Acceptance, E,TB, VU by RW at 12/9/2022 1451    Acceptance, E,TB, VU by BC at 11/27/2022 1319    Acceptance, E,TB, VU by RM at 11/20/2022 1449    Acceptance, E,TB, VU by RM at 11/18/2022 1021    Acceptance, E,TB, VU by RH at 11/16/2022 1110    Acceptance, E,TB, VU by KK at 11/14/2022 1638    Acceptance, E,TB, VU by KK at 11/13/2022 1648    Acceptance, E,TB, VU by KG at 11/10/2022 1827    Acceptance, E,TB, VU by DM at 11/9/2022 1638    Acceptance, E,TB, VU by KK at 11/8/2022 1754    Acceptance, E,TB, VU by KK at 11/7/2022 1635    Acceptance, E,D, VU,NR by LL at 11/5/2022 1325    Acceptance, E, VU by EB at 11/4/2022 1448    Acceptance, E,TB, VU by KM at 11/4/2022 1101   Family Acceptance, D,E, VU,NR by LL at 12/12/2022 1455                   Point: Body mechanics (Done)     Learning Progress Summary           Patient Acceptance, E,D, VU,NR by LL at 12/16/2022 1605    Acceptance, E,TB, VU by AC at  12/16/2022 1523    Acceptance, E,TB, VU by AC at 12/15/2022 1618    Acceptance, E,TB, VU by AC at 12/14/2022 1503    Acceptance, E,TB, VU by DL at 12/13/2022 0240    Acceptance, D,E, VU,NR by LL at 12/12/2022 1455    Acceptance, E,TB, VU by RW at 12/9/2022 1451    Acceptance, E,TB, VU by BC at 11/27/2022 1319    Acceptance, E,TB, VU by RM at 11/20/2022 1449    Acceptance, E,TB, VU by RM at 11/18/2022 1021    Acceptance, E,TB, VU by RH at 11/16/2022 1110    Acceptance, E,TB, VU by KK at 11/14/2022 1638    Acceptance, E,TB, VU by KK at 11/13/2022 1648    Acceptance, E,TB, VU by KG at 11/10/2022 1827    Acceptance, E,TB, VU by DM at 11/9/2022 1638    Acceptance, E,TB, VU by KK at 11/8/2022 1754    Acceptance, E,TB, VU by KK at 11/7/2022 1635    Acceptance, E,D, VU,NR by LL at 11/5/2022 1325    Acceptance, E, VU by EB at 11/4/2022 1448    Acceptance, E,TB, VU by KM at 11/4/2022 1101   Family Acceptance, D,E, VU,NR by LL at 12/12/2022 1455                   Point: Precautions (Done)     Learning Progress Summary           Patient Acceptance, E,D, VU,NR by LL at 12/16/2022 1605    Acceptance, E,TB, VU by AC at 12/16/2022 1523    Acceptance, E,TB, VU by AC at 12/15/2022 1618    Acceptance, E,TB, VU by AC at 12/14/2022 1503    Acceptance, E,TB, VU by DL at 12/13/2022 0240    Acceptance, D,E, VU,NR by LL at 12/12/2022 1455    Acceptance, E,TB, VU by RW at 12/9/2022 1451    Acceptance, E,TB, VU by BC at 11/27/2022 1319    Acceptance, E,TB, VU by RM at 11/20/2022 1449    Acceptance, E,TB, VU by RM at 11/18/2022 1021    Acceptance, E,TB, VU by RH at 11/16/2022 1110    Acceptance, E,TB, VU by KK at 11/14/2022 1638    Acceptance, E,TB, VU by KK at 11/13/2022 1648    Acceptance, E,TB, VU by KG at 11/10/2022 1827    Acceptance, E,TB, VU by DM at 11/9/2022 1638    Acceptance, E,TB, VU by KK at 11/8/2022 1754    Acceptance, E,TB, VU by KK at 11/7/2022 1635    Acceptance, E,D, VU,NR by LL at 11/5/2022 1325    Acceptance, E, VU by EB  at 11/4/2022 1448    Acceptance, E,TB, VU by KM at 11/4/2022 1101   Family Acceptance, D,E, VU,NR by LL at 12/12/2022 1455                               User Key     Initials Effective Dates Name Provider Type Discipline    LL 05/02/16 -  Neda Garcia PTA Physical Therapist Assistant PT    RM 06/14/22 -  Bethany Krause, RN Registered Nurse Nurse    DM 06/16/21 -  Moniz, Deborah, RN Registered Nurse Nurse    BC 08/02/22 -  Collett, Brittany, RN Registered Nurse Nurse    AC 10/13/22 -  Jaya Castillo, SONAM Registered Nurse Nurse    KM 05/24/22 -  Mao Vázquez, PT Physical Therapist PT    EB 09/22/22 -  Cedric Oliver, RN Registered Nurse Nurse    KK 06/27/22 -  Chelsi Bolivar, RN Registered Nurse Nurse    DL 03/16/22 -  Francy Garcia, RN Registered Nurse Nurse    KG 09/22/22 -  Verónica Rodriguez, RN Registered Nurse Nurse    RW 09/22/22 -  Bethany Schofield, RN Registered Nurse Nurse    RH 08/03/22 -  Jung Russell RN Extern Registered Nurse Nurse              PT Recommendation and Plan         Outcome Measures     Row Name 12/16/22 1600 12/16/22 1500 12/15/22 1600       How much help from another person do you currently need...    Turning from your back to your side while in flat bed without using bedrails? 3  -LL -- 3  -LL    Moving from lying on back to sitting on the side of a flat bed without bedrails? 3  -LL -- 3  -LL    Moving to and from a bed to a chair (including a wheelchair)? 3  -LL -- 2  -LL    Standing up from a chair using your arms (e.g., wheelchair, bedside chair)? 3  -LL -- 2  -LL    Climbing 3-5 steps with a railing? 1  -LL -- 1  -LL    To walk in hospital room? 2  -LL -- 2  -LL    AM-PAC 6 Clicks Score (PT) 15  -LL -- 13  -LL       How much help from another is currently needed...    Putting on and taking off regular lower body clothing? -- 2  -LM --    Bathing (including washing, rinsing, and drying) -- 3  -LM --    Toileting (which includes using toilet bed pan or urinal) -- 3  -LM --     Putting on and taking off regular upper body clothing -- 3  -LM --    Taking care of personal grooming (such as brushing teeth) -- 3  -LM --    Eating meals -- 4  -LM --    AM-PAC 6 Clicks Score (OT) -- 18  -LM --       Functional Assessment    Outcome Measure Options AM-PAC 6 Clicks Basic Mobility (PT)  -LL AM-PAC 6 Clicks Daily Activity (OT)  -LM AM-PAC 6 Clicks Basic Mobility (PT)  -LL    Row Name 12/15/22 1200 12/14/22 1356          How much help from another is currently needed...    Putting on and taking off regular lower body clothing? 2  -LM 2  -KP     Bathing (including washing, rinsing, and drying) 3  -LM 3  -KP     Toileting (which includes using toilet bed pan or urinal) 3  -LM 3  -KP     Putting on and taking off regular upper body clothing 3  -LM 3  -KP     Taking care of personal grooming (such as brushing teeth) 3  -LM 3  -KP     Eating meals 4  -LM 4  -KP     AM-PAC 6 Clicks Score (OT) 18  -LM 18  -KP        Functional Assessment    Outcome Measure Options AM-PAC 6 Clicks Daily Activity (OT)  -LM --           User Key  (r) = Recorded By, (t) = Taken By, (c) = Cosigned By    Initials Name Provider Type    LL Neda Garcia PTA Physical Therapist Assistant    LM Татьяна Bah, OT Occupational Therapist    Linn Richardson, VELASQUEZ Occupational Therapist                 Time Calculation:    PT Charges     Row Name 12/16/22 1605             Time Calculation    Start Time 1340  -LL      Stop Time 1425  -LL      Time Calculation (min) 45 min  -LL      PT Received On 12/16/22  -LL         Time Calculation- PT    Total Timed Code Minutes- PT 45 minute(s)  -LL            User Key  (r) = Recorded By, (t) = Taken By, (c) = Cosigned By    Initials Name Provider Type    Neda Kyle PTA Physical Therapist Assistant              Therapy Charges for Today     Code Description Service Date Service Provider Modifiers Qty    49962862924 HC GAIT TRAINING EA 15 MIN 12/15/2022 Neda Garcia PTA GP, CQ 1     47134569412 HC PT THERAPEUTIC ACT EA 15 MIN 12/15/2022 Rita Garciaette, PTA GP, CQ 1    23978142146 HC PT THER PROC EA 15 MIN 12/15/2022 Neda Garcia, PTA GP, CQ 1    31972939856 HC GAIT TRAINING EA 15 MIN 12/16/2022 Neda Garcia, PTA GP, CQ 1    11415025662 HC PT THERAPEUTIC ACT EA 15 MIN 12/16/2022 Neda Garcia, PTA GP, CQ 1    58513797604 HC PT THER PROC EA 15 MIN 12/16/2022 JoseNeda, PTA GP, CQ 1          PT G-Codes  Outcome Measure Options: AM-PAC 6 Clicks Basic Mobility (PT)  AM-PAC 6 Clicks Score (PT): 15  AM-PAC 6 Clicks Score (OT): 18    Hannah Gracia PTA  12/16/2022

## 2022-12-16 NOTE — PLAN OF CARE
Goal Outcome Evaluation:  Plan of Care Reviewed With: patient        Progress: no change  Outcome Evaluation: Pt sitting in chair throughout shift. Pt worked with therapy. She has been getting up to bedside commode. No compaints at this times. VSS. Will continue plan of care.

## 2022-12-16 NOTE — PLAN OF CARE
Goal Outcome Evaluation:  Plan of Care Reviewed With: patient        Progress: improving  Outcome Evaluation: Pt sitting in chair during assessment. No complaints at this time. Vital signs stable, no acute changes at this time. Will continue to monitor.

## 2022-12-16 NOTE — PROGRESS NOTES
[facility] HOSPITALISTS CROSS COVER NOTE    Patient Identification:  Name:  Tre Kinney  Age:  89 y.o.  Sex:  female  :  1932  MRN:  [id number]  Visit number:  [id number]  Primary Care Provider:  Jessica Pedro APRN    Length of stay in inpatient status:  72    Brief Update     I saw the patient while she was sitting at the bedside chair; with me with the patient's to bedside nurses.  The patient states that she still feels fatigued and tired and she is still having some right-sided rib area pain.  However, the area still is not tender to palpation and there are no skin changes.  Patient is having some bowel movements but so far she does not think this is helped that pain.  She also states that the pain does not worsen with exertion.  Patient has been participating with physical therapy; in fact, the patient was able to walk outside of her room and go to the nurses station to have conversation with the hospital staff.  She walked a total of about 75 feet, which is the most that she has been able to walk so far.  Please note that the appeal was denied but the family has submitted a letter of reconsideration and we await the decision of this.  In the meantime, we will continue monitoring the patient closely, with plans to do almost weekly labs and we will continue to monitor her vital signs closely.    Jannet Lagunas MD  [facility]  22  17:11 EST

## 2022-12-16 NOTE — THERAPY TREATMENT NOTE
Acute Care - Occupational Therapy Treatment Note   Scot     Patient Name: Tre Kinney  : 1932  MRN: [id number]  Today's Date: 2022  Onset of Illness/Injury or Date of Surgery: 10/06/22     Referring Physician: Janneth    Admit Date: 10/5/2022     No diagnosis found.  Patient Active Problem List   Diagnosis   • Acid reflux   • Ankle arthralgia   • Cardiac conduction disorder   • Closed fracture of distal fibula   • Arteriosclerosis of coronary artery   • Fracture of distal end of tibia   • Elevated cholesterol   • BP (high blood pressure)   • Hypertrophic polyarthritis   • OP (osteoporosis)   • Right heart failure (HCC)   • Heart failure, chronic, right-sided (HCC)   • SOB (shortness of breath)   • Palpitations   • CHF (congestive heart failure) (HCC)   • Congestive heart failure (HCC)   • Precordial pain   • Chronic anticoagulation   • Hypokalemia   • Anemia, unspecified type   • Generalized weakness     Past Medical History:   Diagnosis Date   • Anemia    • Arthritis    • CHF (congestive heart failure) ([facility])    • Chronic kidney disease    • Coronary artery disease    • H/O blood clots    • History of transfusion     no reaction    • Hypertension      Past Surgical History:   Procedure Laterality Date   • ABDOMINAL SURGERY     • APPENDECTOMY     • CARDIAC SURGERY     •  SECTION     • COLONOSCOPY N/A 3/7/2018    Procedure: COLONOSCOPY;  Surgeon: Willie Gresham MD;  Location:  [facility];  Service:    • CORONARY ANGIOPLASTY WITH STENT PLACEMENT     • ENDOSCOPY N/A 3/6/2018    Procedure: ESOPHAGOGASTRODUODENOSCOPY;  Surgeon: Willie Gresham MD;  Location:  [facility];  Service:    • PACEMAKER IMPLANTATION      left side    • SKIN BIOPSY     • TUBAL ABDOMINAL LIGATION           OT ASSESSMENT FLOWSHEET (last 12 hours)     OT Evaluation and Treatment     Row Name 22 1511                   OT Time and Intention    Subjective Information no complaints  -LM        Document Type  therapy note (daily note)  -LM        Mode of Treatment occupational therapy  -LM        Patient Effort good  -LM        Comment Patient seen this pm for bue arom/therex from [id number].  Patient completed light bue arom towel therex with frequent rest breaks followed up with yellow tband.  Frequent rest breaks.  good participation..  -LM           General Information    Existing Precautions/Restrictions fall  -LM           Cognition    Affect/Mental Status (Cognition) WFL  -LM        Orientation Status (Cognition) oriented x 4  -LM           Positioning and Restraints    Post Treatment Position chair  -LM        In Bed call light within reach;encouraged to call for assist  -LM              User Key  (r) = Recorded By, (t) = Taken By, (c) = Cosigned By    Initials Name Effective Dates    LM Татьяна Bah, OT 06/16/21 -                        OT Recommendation and Plan           Outcome Measures     Row Name 12/16/22 1500 12/15/22 1600 12/15/22 1200       How much help from another person do you currently need...    Turning from your back to your side while in flat bed without using bedrails? -- 3  -LL --    Moving from lying on back to sitting on the side of a flat bed without bedrails? -- 3  -LL --    Moving to and from a bed to a chair (including a wheelchair)? -- 2  -LL --    Standing up from a chair using your arms (e.g., wheelchair, bedside chair)? -- 2  -LL --    Climbing 3-5 steps with a railing? -- 1  -LL --    To walk in hospital room? -- 2  -LL --    AM-PAC 6 Clicks Score (PT) -- 13  -LL --       How much help from another is currently needed...    Putting on and taking off regular lower body clothing? 2  -LM -- 2  -LM    Bathing (including washing, rinsing, and drying) 3  -LM -- 3  -LM    Toileting (which includes using toilet bed pan or urinal) 3  -LM -- 3  -LM    Putting on and taking off regular upper body clothing 3  -LM -- 3  -LM    Taking care of personal grooming (such as brushing teeth) 3  -LM --  3  -LM    Eating meals 4  -LM -- 4  -LM    AM-PAC 6 Clicks Score (OT) 18  -LM -- 18  -LM       Functional Assessment    Outcome Measure Options AM-PAC 6 Clicks Daily Activity (OT)  -LM AM-PAC 6 Clicks Basic Mobility (PT)  -LL AM-PAC 6 Clicks Daily Activity (OT)  -LM    Row Name 12/14/22 1356             How much help from another is currently needed...    Putting on and taking off regular lower body clothing? 2  -KP      Bathing (including washing, rinsing, and drying) 3  -KP      Toileting (which includes using toilet bed pan or urinal) 3  -KP      Putting on and taking off regular upper body clothing 3  -KP      Taking care of personal grooming (such as brushing teeth) 3  -KP      Eating meals 4  -KP      AM-PAC 6 Clicks Score (OT) 18  -KP            User Key  (r) = Recorded By, (t) = Taken By, (c) = Cosigned By    Initials Name Provider Type     Neda Garcia PTA Physical Therapist Assistant    Татьяна Mccabe, OT Occupational Therapist    Linn Richardson OT Occupational Therapist                Time Calculation:    Time Calculation- OT     Row Name 12/16/22 8584             Time Calculation- OT    Total Timed Code Minutes- OT 25 minute(s)  -            User Key  (r) = Recorded By, (t) = Taken By, (c) = Cosigned By    Initials Name Provider Type    LM Татьяна Bah, OT Occupational Therapist              Therapy Charges for Today     Code Description Service Date Service Provider Modifiers Qty    38321940033 HC OT THER PROC EA 15 MIN 12/15/2022 Татьяна Bah OT GO 2    46263718890 HC OT THER PROC EA 15 MIN 12/16/2022 Татьяна Bah OT GO 2               Татьяна Bah OT  12/16/2022

## 2022-12-17 PROCEDURE — 97110 THERAPEUTIC EXERCISES: CPT

## 2022-12-17 PROCEDURE — 97530 THERAPEUTIC ACTIVITIES: CPT

## 2022-12-17 RX ADMIN — DICLOFENAC 4 G: 10 GEL TOPICAL at 13:07

## 2022-12-17 RX ADMIN — METOPROLOL TARTRATE 25 MG: 25 TABLET, FILM COATED ORAL at 08:08

## 2022-12-17 RX ADMIN — DICLOFENAC 4 G: 10 GEL TOPICAL at 20:24

## 2022-12-17 RX ADMIN — FUROSEMIDE 40 MG: 40 TABLET ORAL at 17:00

## 2022-12-17 RX ADMIN — TAMSULOSIN HYDROCHLORIDE 0.4 MG: 0.4 CAPSULE ORAL at 08:10

## 2022-12-17 RX ADMIN — MAGNESIUM GLUCONATE 500 MG ORAL TABLET 400 MG: 500 TABLET ORAL at 08:11

## 2022-12-17 RX ADMIN — APIXABAN 5 MG: 5 TABLET, FILM COATED ORAL at 20:23

## 2022-12-17 RX ADMIN — Medication 1 TABLET: at 08:10

## 2022-12-17 RX ADMIN — Medication 10 MG: at 20:23

## 2022-12-17 RX ADMIN — DICLOFENAC 4 G: 10 GEL TOPICAL at 08:06

## 2022-12-17 RX ADMIN — DICLOFENAC 4 G: 10 GEL TOPICAL at 17:00

## 2022-12-17 RX ADMIN — HYDRALAZINE HYDROCHLORIDE 5 MG: 25 TABLET, FILM COATED ORAL at 14:19

## 2022-12-17 RX ADMIN — DOCUSATE SODIUM 100 MG: 100 CAPSULE ORAL at 08:12

## 2022-12-17 RX ADMIN — APIXABAN 5 MG: 5 TABLET, FILM COATED ORAL at 08:10

## 2022-12-17 RX ADMIN — METOPROLOL TARTRATE 25 MG: 25 TABLET, FILM COATED ORAL at 20:23

## 2022-12-17 RX ADMIN — HYDRALAZINE HYDROCHLORIDE 5 MG: 25 TABLET, FILM COATED ORAL at 05:30

## 2022-12-17 RX ADMIN — DOCUSATE SODIUM 100 MG: 100 CAPSULE ORAL at 20:23

## 2022-12-17 RX ADMIN — FUROSEMIDE 40 MG: 40 TABLET ORAL at 08:11

## 2022-12-17 RX ADMIN — GABAPENTIN 300 MG: 300 CAPSULE ORAL at 20:24

## 2022-12-17 RX ADMIN — ESCITALOPRAM 10 MG: 10 TABLET, FILM COATED ORAL at 08:11

## 2022-12-17 RX ADMIN — PANTOPRAZOLE SODIUM 40 MG: 40 TABLET, DELAYED RELEASE ORAL at 05:30

## 2022-12-17 RX ADMIN — HYDRALAZINE HYDROCHLORIDE 5 MG: 25 TABLET, FILM COATED ORAL at 21:55

## 2022-12-17 NOTE — THERAPY TREATMENT NOTE
Acute Care - Occupational Therapy Treatment Note   [place]     Patient Name: Tre Kinney  : 1932  MRN: [id number]  Today's Date: 2022  Onset of Illness/Injury or Date of Surgery: 10/06/22     Referring Physician: Janneth    Admit Date: 10/5/2022     No diagnosis found.  Patient Active Problem List   Diagnosis   • Acid reflux   • Ankle arthralgia   • Cardiac conduction disorder   • Closed fracture of distal fibula   • Arteriosclerosis of coronary artery   • Fracture of distal end of tibia   • Elevated cholesterol   • BP (high blood pressure)   • Hypertrophic polyarthritis   • OP (osteoporosis)   • Right heart failure (HCC)   • Heart failure, chronic, right-sided (HCC)   • SOB (shortness of breath)   • Palpitations   • CHF (congestive heart failure) (HCC)   • Congestive heart failure (HCC)   • Precordial pain   • Chronic anticoagulation   • Hypokalemia   • Anemia, unspecified type   • Generalized weakness     Past Medical History:   Diagnosis Date   • Anemia    • Arthritis    • CHF (congestive heart failure) ([facility])    • Chronic kidney disease    • Coronary artery disease    • H/O blood clots    • History of transfusion     no reaction    • Hypertension      Past Surgical History:   Procedure Laterality Date   • ABDOMINAL SURGERY     • APPENDECTOMY     • CARDIAC SURGERY     •  SECTION     • COLONOSCOPY N/A 3/7/2018    Procedure: COLONOSCOPY;  Surgeon: Willie Gresham MD;  Location:  [facility];  Service:    • CORONARY ANGIOPLASTY WITH STENT PLACEMENT     • ENDOSCOPY N/A 3/6/2018    Procedure: ESOPHAGOGASTRODUODENOSCOPY;  Surgeon: Willie Gresham MD;  Location:  [facility];  Service:    • PACEMAKER IMPLANTATION      left side    • SKIN BIOPSY     • TUBAL ABDOMINAL LIGATION           OT ASSESSMENT FLOWSHEET (last 12 hours)     OT Evaluation and Treatment     Row Name 22 1329                   OT Time and Intention    Subjective Information no complaints  -LA        Document Type  therapy note (daily note)  -LA        Mode of Treatment individual therapy;occupational therapy  -LA        Patient Effort good  -LA        Symptoms Noted During/After Treatment none  -LA           General Information    Patient Profile Reviewed yes  -LA        General Observations of Patient Patient pleasant and agreeable to therapy this date. Patient with good particpation.  -LA        Existing Precautions/Restrictions fall  -LA           Cognition    Affect/Mental Status (Cognition) WFL  -LA        Orientation Status (Cognition) oriented x 4  -LA        Follows Commands (Cognition) WFL  -LA           Self-Feeding Assessment/Training    [place] Level (Feeding) set up  -LA           Motor Skills    Motor Skills coordination;functional endurance  -LA        Functional Endurance fair  -LA        Motor Control/Coordination Interventions therapeutic exercise/ROM  -LA        Therapeutic Exercise shoulder;elbow/forearm;wrist;hand  yellow theraband exercises completed in all planes. Exercises completed 10 x3 sets. Patient with some noted wheezing resulting in decreased reps this date.  -LA           Plan of Care Review    Plan of Care Reviewed With patient  -LA        Progress improving  -LA           Positioning and Restraints    Pre-Treatment Position sitting in chair/recliner  -LA        Post Treatment Position chair  -LA        In Chair call light within reach;encouraged to call for assist;sitting  -LA           Progress Summary (OT)    Daily Progress Summary (OT) Patient able to completed UE resistive (yellow) exercises in all planes 10 x3 sets. OT asked about FaceTiming son Willian upon entry. Patient reported that Willian was sick with cold and she didn't want to bother him. Willian did call during visit and aware of OT visit.  -LA              User Key  (r) = Recorded By, (t) = Taken By, (c) = Cosigned By    Initials Name Effective Dates    Dianna Barreto OT 02/14/22 -                        OT Recommendation and  Plan  Planned Therapy Interventions (OT): activity tolerance training, patient/caregiver education/training, adaptive equipment training, ROM/therapeutic exercise, occupation/activity based interventions, BADL retraining, strengthening exercise, transfer/mobility retraining, functional balance retraining  Therapy Frequency (OT): 6 times/wk  Plan of Care Review  Plan of Care Reviewed With: patient  Progress: improving  Outcome Evaluation: OT evaluation completed this date. Patient engaged in UE tasks with focus on trunk control and fx reaching to promote increased independence with ADLs. Patient has had a significant decline in independence and safety with ADLs resulting in need for skilled OT services. OT to address deficits to allow patient to max level of independence prior to d/c. Patient admitted for swing bed and OT discussed activities. Patient given word search puzzles to promote increase activity. patient also reports she watches television. Family also stated they could bring in books for entertainment as needed.  Plan of Care Reviewed With: patient  Outcome Evaluation: OT evaluation completed this date. Patient engaged in UE tasks with focus on trunk control and fx reaching to promote increased independence with ADLs. Patient has had a significant decline in independence and safety with ADLs resulting in need for skilled OT services. OT to address deficits to allow patient to max level of independence prior to d/c. Patient admitted for swing bed and OT discussed activities. Patient given word search puzzles to promote increase activity. patient also reports she watches television. Family also stated they could bring in books for entertainment as needed.     Outcome Measures     Row Name 12/17/22 1300 12/16/22 1600 12/16/22 1500       How much help from another person do you currently need...    Turning from your back to your side while in flat bed without using bedrails? -- 3  -LL --    Moving from lying  on back to sitting on the side of a flat bed without bedrails? -- 3  -LL --    Moving to and from a bed to a chair (including a wheelchair)? -- 3  -LL --    Standing up from a chair using your arms (e.g., wheelchair, bedside chair)? -- 3  -LL --    Climbing 3-5 steps with a railing? -- 1  -LL --    To walk in hospital room? -- 2  -LL --    AM-PAC 6 Clicks Score (PT) -- 15  -LL --       How much help from another is currently needed...    Putting on and taking off regular lower body clothing? 2  -LA -- 2  -LM    Bathing (including washing, rinsing, and drying) 3  -LA -- 3  -LM    Toileting (which includes using toilet bed pan or urinal) 3  -LA -- 3  -LM    Putting on and taking off regular upper body clothing 3  -LA -- 3  -LM    Taking care of personal grooming (such as brushing teeth) 3  -LA -- 3  -LM    Eating meals 4  -LA -- 4  -LM    AM-PAC 6 Clicks Score (OT) 18  -LA -- 18  -LM       Functional Assessment    Outcome Measure Options AM-PAC 6 Clicks Daily Activity (OT)  -LA AM-PAC 6 Clicks Basic Mobility (PT)  -LL AM-PAC 6 Clicks Daily Activity (OT)  -LM    Row Name 12/15/22 1600 12/15/22 1200 12/14/22 0837       How much help from another person do you currently need...    Turning from your back to your side while in flat bed without using bedrails? 3  -LL -- --    Moving from lying on back to sitting on the side of a flat bed without bedrails? 3  -LL -- --    Moving to and from a bed to a chair (including a wheelchair)? 2  -LL -- --    Standing up from a chair using your arms (e.g., wheelchair, bedside chair)? 2  -LL -- --    Climbing 3-5 steps with a railing? 1  -LL -- --    To walk in hospital room? 2  -LL -- --    AM-PAC 6 Clicks Score (PT) 13  -LL -- --       How much help from another is currently needed...    Putting on and taking off regular lower body clothing? -- 2  -LM 2  -KP    Bathing (including washing, rinsing, and drying) -- 3  -LM 3  -KP    Toileting (which includes using toilet bed pan or  urinal) -- 3  -LM 3  -KP    Putting on and taking off regular upper body clothing -- 3  -LM 3  -KP    Taking care of personal grooming (such as brushing teeth) -- 3  -LM 3  -KP    Eating meals -- 4  -LM 4  -KP    AM-PAC 6 Clicks Score (OT) -- 18  -LM 18  -KP       Functional Assessment    Outcome Measure Options AM-PAC 6 Clicks Basic Mobility (PT)  -LL AM-PAC 6 Clicks Daily Activity (OT)  -LM --          User Key  (r) = Recorded By, (t) = Taken By, (c) = Cosigned By    Initials Name Provider Type    LL Neda Garcia, NACHO Physical Therapist Assistant    LM Татьяна Bah, OT Occupational Therapist    Linn Richardson, OT Occupational Therapist    Dianna Barreto OT Occupational Therapist                Time Calculation:    Time Calculation- OT     Row Name 12/17/22 1334             Time Calculation- OT    OT Start Time 1300  -LA      Total Timed Code Minutes- OT 28 minute(s)  -LA            User Key  (r) = Recorded By, (t) = Taken By, (c) = Cosigned By    Initials Name Provider Type    Dianna Barreto OT Occupational Therapist              Therapy Charges for Today     Code Description Service Date Service Provider Modifiers Qty    78152292481 HC OT THER PROC EA 15 MIN 12/17/2022 Dianna Siddiqi OT GO 1    39889698620 HC OT THERAPEUTIC ACT EA 15 MIN 12/17/2022 Dianna Siddiqi OT GO 1               Dianna Siddiqi OT  12/17/2022

## 2022-12-17 NOTE — THERAPY TREATMENT NOTE
Acute Care - Physical Therapy Treatment Note   [place]     Patient Name: Tre Kinney  : 1932  MRN: [id number]  Today's Date: 2022   Onset of Illness/Injury or Date of Surgery: 10/06/22  Visit Dx:         **Please note flowsheet dated at 1400 however PT entered room approximately 1420**      No diagnosis found.  Patient Active Problem List   Diagnosis   • Acid reflux   • Ankle arthralgia   • Cardiac conduction disorder   • Closed fracture of distal fibula   • Arteriosclerosis of coronary artery   • Fracture of distal end of tibia   • Elevated cholesterol   • BP (high blood pressure)   • Hypertrophic polyarthritis   • OP (osteoporosis)   • Right heart failure (HCC)   • Heart failure, chronic, right-sided (HCC)   • SOB (shortness of breath)   • Palpitations   • CHF (congestive heart failure) (HCC)   • Congestive heart failure (HCC)   • Precordial pain   • Chronic anticoagulation   • Hypokalemia   • Anemia, unspecified type   • Generalized weakness     Past Medical History:   Diagnosis Date   • Anemia    • Arthritis    • CHF (congestive heart failure) ([facility])    • Chronic kidney disease    • Coronary artery disease    • H/O blood clots    • History of transfusion     no reaction    • Hypertension      Past Surgical History:   Procedure Laterality Date   • ABDOMINAL SURGERY     • APPENDECTOMY     • CARDIAC SURGERY     •  SECTION     • COLONOSCOPY N/A 3/7/2018    Procedure: COLONOSCOPY;  Surgeon: Willie Gresham MD;  Location:  [facility];  Service:    • CORONARY ANGIOPLASTY WITH STENT PLACEMENT     • ENDOSCOPY N/A 3/6/2018    Procedure: ESOPHAGOGASTRODUODENOSCOPY;  Surgeon: Willie Gresham MD;  Location:  [facility];  Service:    • PACEMAKER IMPLANTATION      left side    • SKIN BIOPSY     • TUBAL ABDOMINAL LIGATION       PT Assessment (last 12 hours)     PT Evaluation and Treatment     Row Name 22 1400          Physical Therapy Time and Intention    Document Type therapy note  (daily note)  -     Mode of Treatment physical therapy  -     Patient Effort good  -     Comment Pt seen for treatment this date with great compliance, ambulated in room approximately 10' from BSChair to opposing side of bed, supine LE TE  -     Row Name 12/17/22 1400          Bed Mobility    Bed Mobility sit-supine;scooting/bridging  -     Scooting/Bridging [place] (Bed Mobility) set up;modified independence  -     Sit-Supine [place] (Bed Mobility) minimum assist (75% patient effort)  -     Row Name 12/17/22 1400          Transfers    Transfers sit-stand transfer;stand-sit transfer  -     Row Name 12/17/22 1400          Sit-Stand Transfer    Sit-Stand [place] (Transfers) moderate assist (50% patient effort);maximum assist (25% patient effort);2 person assist;minimum assist (75% patient effort);1 person assist  -     Comment, (Sit-Stand Transfer) Pt stood up twice, once from chair with more assist of mod/maxAx2, the 2nd trial was with less assistance & approximately min/modAx1  -[facility] Name 12/17/22 1400          Stand-Sit Transfer    Stand-Sit [place] (Transfers) contact guard;minimum assist (75% patient effort)  -     Row Name 12/17/22 1400          Gait/Stairs (Locomotion)    Comment, (Gait/Stairs) Pt ambulated approximately 10' with RW with fatigue at the end, pt able to sit safely on bed. Pt ambulated laterally near EOB for better supine placement towards HOB.  -     Row Name 12/17/22 1400          Motor Skills    Therapeutic Exercise hip  SLR 3x10 BL approximately; bridging [id number]  -[facility] Name 12/17/22 1400          Positioning and Restraints    Pre-Treatment Position sitting in chair/recliner  -     Post Treatment Position bed  -KH     In Bed supine;call light within reach;exit alarm on  -           User Key  (r) = Recorded By, (t) = Taken By, (c) = Cosigned By    Initials Name Provider Type    Radha Yusuf, PT Physical Therapist                       Physical Therapy Education     Title: PT OT SLP Therapies (Done)     Topic: Physical Therapy (Done)     Point: Mobility training (Done)     Learning Progress Summary           Patient Acceptance, E,D, VU,NR by LL at 12/16/2022 1605    Acceptance, E,TB, VU by AC at 12/16/2022 1523    Acceptance, E,TB, VU by AC at 12/15/2022 1618    Acceptance, E,TB, VU by AC at 12/14/2022 1503    Acceptance, E,TB, VU by DL at 12/13/2022 0240    Acceptance, D,E, VU,NR by LL at 12/12/2022 1455    Acceptance, E,TB, VU by RW at 12/9/2022 1451    Acceptance, E,TB, VU by BC at 11/27/2022 1319    Acceptance, E,TB, VU by RM at 11/20/2022 1449    Acceptance, E,TB, VU by RM at 11/18/2022 1021    Acceptance, E,TB, VU by RH at 11/16/2022 1110    Acceptance, E,TB, VU by KK at 11/14/2022 1638    Acceptance, E,TB, VU by KK at 11/13/2022 1648    Acceptance, E,TB, VU by KG at 11/10/2022 1827    Acceptance, E,TB, VU by DM at 11/9/2022 1638    Acceptance, E,TB, VU by KK at 11/8/2022 1754    Acceptance, E,TB, VU by KK at 11/7/2022 1635    Acceptance, E,D, VU,NR by LL at 11/5/2022 1325    Acceptance, E, VU by EB at 11/4/2022 1448    Acceptance, E,TB, VU by KM at 11/4/2022 1101   Family Acceptance, D,E, VU,NR by LL at 12/12/2022 1455                   Point: Home exercise program (Done)     Learning Progress Summary           Patient Acceptance, E,D, VU,NR by LL at 12/16/2022 1605    Acceptance, E,TB, VU by AC at 12/16/2022 1523    Acceptance, E,TB, VU by AC at 12/15/2022 1618    Acceptance, E,TB, VU by AC at 12/14/2022 1503    Acceptance, E,TB, VU by DL at 12/13/2022 0240    Acceptance, D,E, VU,NR by LL at 12/12/2022 1455    Acceptance, E,TB, VU by RW at 12/9/2022 1451    Acceptance, E,TB, VU by BC at 11/27/2022 1319    Acceptance, E,TB, VU by RM at 11/20/2022 1449    Acceptance, E,TB, VU by RM at 11/18/2022 1021    Acceptance, E,TB, VU by RH at 11/16/2022 1110    Acceptance, E,TB, VU by KK at 11/14/2022 1638    Acceptance, E,TB, VU by KK at  11/13/2022 1648    Acceptance, E,TB, VU by KG at 11/10/2022 1827    Acceptance, E,TB, VU by DM at 11/9/2022 1638    Acceptance, E,TB, VU by KK at 11/8/2022 1754    Acceptance, E,TB, VU by KK at 11/7/2022 1635    Acceptance, E,D, VU,NR by LL at 11/5/2022 1325    Acceptance, E, VU by EB at 11/4/2022 1448    Acceptance, E,TB, VU by KM at 11/4/2022 1101   Family Acceptance, D,E, VU,NR by LL at 12/12/2022 1455                   Point: Body mechanics (Done)     Learning Progress Summary           Patient Acceptance, E,D, VU,NR by LL at 12/16/2022 1605    Acceptance, E,TB, VU by AC at 12/16/2022 1523    Acceptance, E,TB, VU by AC at 12/15/2022 1618    Acceptance, E,TB, VU by AC at 12/14/2022 1503    Acceptance, E,TB, VU by DL at 12/13/2022 0240    Acceptance, D,E, VU,NR by LL at 12/12/2022 1455    Acceptance, E,TB, VU by RW at 12/9/2022 1451    Acceptance, E,TB, VU by BC at 11/27/2022 1319    Acceptance, E,TB, VU by RM at 11/20/2022 1449    Acceptance, E,TB, VU by RM at 11/18/2022 1021    Acceptance, E,TB, VU by RH at 11/16/2022 1110    Acceptance, E,TB, VU by KK at 11/14/2022 1638    Acceptance, E,TB, VU by KK at 11/13/2022 1648    Acceptance, E,TB, VU by KG at 11/10/2022 1827    Acceptance, E,TB, VU by DM at 11/9/2022 1638    Acceptance, E,TB, VU by KK at 11/8/2022 1754    Acceptance, E,TB, VU by KK at 11/7/2022 1635    Acceptance, E,D, VU,NR by LL at 11/5/2022 1325    Acceptance, E, VU by EB at 11/4/2022 1448    Acceptance, E,TB, VU by KM at 11/4/2022 1101   Family Acceptance, D,E, VU,NR by LL at 12/12/2022 1455                   Point: Precautions (Done)     Learning Progress Summary           Patient Acceptance, E,D, VU,NR by LL at 12/16/2022 1605    Acceptance, E,TB, VU by AC at 12/16/2022 1523    Acceptance, E,TB, VU by AC at 12/15/2022 1618    Acceptance, E,TB, VU by AC at 12/14/2022 1503    Acceptance, E,TB, VU by DL at 12/13/2022 0240    Acceptance, D,E, VU,NR by LL at 12/12/2022 1455    Acceptance, E,TB, VU by  RW at 12/9/2022 1451    Acceptance, E,TB, VU by BC at 11/27/2022 1319    Acceptance, E,TB, VU by RM at 11/20/2022 1449    Acceptance, E,TB, VU by RM at 11/18/2022 1021    Acceptance, E,TB, VU by RH at 11/16/2022 1110    Acceptance, E,TB, VU by KK at 11/14/2022 1638    Acceptance, E,TB, VU by KK at 11/13/2022 1648    Acceptance, E,TB, VU by KG at 11/10/2022 1827    Acceptance, E,TB, VU by DM at 11/9/2022 1638    Acceptance, E,TB, VU by KK at 11/8/2022 1754    Acceptance, E,TB, VU by KK at 11/7/2022 1635    Acceptance, E,D, VU,NR by LL at 11/5/2022 1325    Acceptance, E, VU by EB at 11/4/2022 1448    Acceptance, E,TB, VU by KM at 11/4/2022 1101   Family Acceptance, D,E, VU,NR by LL at 12/12/2022 1455                               User Key     Initials Effective Dates Name Provider Type Discipline    LL 05/02/16 -  Neda Garcia PTA Physical Therapist Assistant PT    RM 06/14/22 -  Bethany Krause, RN Registered Nurse Nurse    DM 06/16/21 -  Moniz, Deborah, RN Registered Nurse Nurse    BC 08/02/22 -  Collett, Brittany, RN Registered Nurse Nurse    AC 10/13/22 -  Jaya Castillo, SONAM Registered Nurse Nurse    KM 05/24/22 -  Mao Vázquez PT Physical Therapist PT    EB 09/22/22 -  Cedric Oliver, RN Registered Nurse Nurse    KK 06/27/22 -  Chelsi Bolivar, RN Registered Nurse Nurse    DL 03/16/22 -  Francy Garcia, RN Registered Nurse Nurse    KG 09/22/22 -  Verónica Rodriguez, RN Registered Nurse Nurse    RW 09/22/22 -  Bethany Schofield, RN Registered Nurse Nurse    RH 08/03/22 -  Jung Russell, RN Extern Registered Nurse Nurse              PT Recommendation and Plan     Outcome Evaluation: Pt was able to take steps this date with RW and approximately modAx2. Pt appears in better spirits today than when this PT saw her last.   Outcome Measures     Row Name 12/17/22 1415 12/17/22 1300 12/16/22 1600       How much help from another person do you currently need...    Turning from your back to your side while in flat bed  without using bedrails? 3  -KH -- 3  -LL    Moving from lying on back to sitting on the side of a flat bed without bedrails? 3  -KH -- 3  -LL    Moving to and from a bed to a chair (including a wheelchair)? 3  -KH -- 3  -LL    Standing up from a chair using your arms (e.g., wheelchair, bedside chair)? 2  -KH -- 3  -LL    Climbing 3-5 steps with a railing? 2  -KH -- 1  -LL    To walk in hospital room? 3  -KH -- 2  -LL    AM-PAC 6 Clicks Score (PT) 16  -KH -- 15  -LL       How much help from another is currently needed...    Putting on and taking off regular lower body clothing? -- 2  -LA --    Bathing (including washing, rinsing, and drying) -- 3  -LA --    Toileting (which includes using toilet bed pan or urinal) -- 3  -LA --    Putting on and taking off regular upper body clothing -- 3  -LA --    Taking care of personal grooming (such as brushing teeth) -- 3  -LA --    Eating meals -- 4  -LA --    AM-PAC 6 Clicks Score (OT) -- 18  -LA --       Functional Assessment    Outcome Measure Options AM-PAC 6 Clicks Basic Mobility (PT)  -KH AM-PAC 6 Clicks Daily Activity (OT)  -LA AM-PAC 6 Clicks Basic Mobility (PT)  -    Row Name 12/16/22 1500 12/15/22 1600 12/15/22 1200       How much help from another person do you currently need...    Turning from your back to your side while in flat bed without using bedrails? -- 3  -LL --    Moving from lying on back to sitting on the side of a flat bed without bedrails? -- 3  -LL --    Moving to and from a bed to a chair (including a wheelchair)? -- 2  -LL --    Standing up from a chair using your arms (e.g., wheelchair, bedside chair)? -- 2  -LL --    Climbing 3-5 steps with a railing? -- 1  -LL --    To walk in hospital room? -- 2  -LL --    AM-PAC 6 Clicks Score (PT) -- 13  -LL --       How much help from another is currently needed...    Putting on and taking off regular lower body clothing? 2  -LM -- 2  -LM    Bathing (including washing, rinsing, and drying) 3  -LM -- 3  -LM     Toileting (which includes using toilet bed pan or urinal) 3  -LM -- 3  -LM    Putting on and taking off regular upper body clothing 3  -LM -- 3  -LM    Taking care of personal grooming (such as brushing teeth) 3  -LM -- 3  -LM    Eating meals 4  -LM -- 4  -LM    AM-PAC 6 Clicks Score (OT) 18  -LM -- 18  -LM       Functional Assessment    Outcome Measure Options AM-PAC 6 Clicks Daily Activity (OT)  -LM AM-PAC 6 Clicks Basic Mobility (PT)  -LL AM-PAC 6 Clicks Daily Activity (OT)  -LM          User Key  (r) = Recorded By, (t) = Taken By, (c) = Cosigned By    Initials Name Provider Type    LL Neda Garcia, PTA Physical Therapist Assistant    LM Татьяна Bah, OT Occupational Therapist    Radha Yusuf, PT Physical Therapist    Dianna Barreto, OT Occupational Therapist                 Time Calculation:    PT Charges     Row Name 12/17/22 1623             Time Calculation    Start Time 1420  -KH      PT Received On 12/17/22  -         Time Calculation- PT    Total Timed Code Minutes- PT 23 minute(s)  -            User Key  (r) = Recorded By, (t) = Taken By, (c) = Cosigned By    Initials Name Provider Type    Radha Yusuf, PT Physical Therapist              Therapy Charges for Today     Code Description Service Date Service Provider Modifiers Qty    58182033237  PT THER PROC EA 15 MIN 12/17/2022 Radha Vu, PT GP 1    17295384831 HC PT THERAPEUTIC ACT EA 15 MIN 12/17/2022 Radha Vu, PT GP 1          PT G-Codes  Outcome Measure Options: AM-PAC 6 Clicks Basic Mobility (PT)  AM-PAC 6 Clicks Score (PT): 16  AM-PAC 6 Clicks Score (OT): 18    Radha Vu PT  12/17/2022

## 2022-12-17 NOTE — PLAN OF CARE
Goal Outcome Evaluation:  Plan of Care Reviewed With: patient        Progress: improving  Outcome Evaluation: Resting comfortably walked in room several times.  Up to chair several hours.  Tolerated well.

## 2022-12-17 NOTE — PLAN OF CARE
Goal Outcome Evaluation:      The patient rested overnight. She was able to ambulate in the room, use the bedside commode and sit in chair for few hours during my shift. She states she is participating with ambulation as much as she can tolerate. She is hopeful to get home before [place]. No acute events or distress overnight. Will continue with the plan of care.

## 2022-12-17 NOTE — PROGRESS NOTES
[facility]    Patient Identification:  Name:  Tre Kinney  Age:  89 y.o.  Sex:  female  :  1932  MRN:  [id number]  Visit number:  [id number]  Primary Care Provider:  Jessica Pedro APRN    Length of stay in inpatient status:  73    Brief Update     I did not see the patient today as no changes were reported during night shift and day shift nursing staff.  Bedside nurse Mitzy states that the patient has been up several times today, but that the patient reports feeling tired.  I reviewed the vital signs and the last few readings have been elevated more than the previous few days.  She is on hydralazine 100 mg at home, but since 10/23/2022 she has received 5 mg here.  Patients of advanced age like this patient usually need a higher blood pressure in order to prevent orthostatic hypotension.  Will obtain orthostatic vital signs before considering increasing the dose as the risk of the orthostatic hypotension in an advance aged patient is more than the benefit of better blood pressure control.    Jannet Lagunas MD  [facility] Hospitalist  22  11:21 EST

## 2022-12-18 RX ADMIN — GABAPENTIN 300 MG: 300 CAPSULE ORAL at 20:27

## 2022-12-18 RX ADMIN — TAMSULOSIN HYDROCHLORIDE 0.4 MG: 0.4 CAPSULE ORAL at 08:54

## 2022-12-18 RX ADMIN — FUROSEMIDE 40 MG: 40 TABLET ORAL at 17:03

## 2022-12-18 RX ADMIN — DICLOFENAC 4 G: 10 GEL TOPICAL at 20:27

## 2022-12-18 RX ADMIN — ESCITALOPRAM 10 MG: 10 TABLET, FILM COATED ORAL at 08:54

## 2022-12-18 RX ADMIN — METOPROLOL TARTRATE 25 MG: 25 TABLET, FILM COATED ORAL at 20:27

## 2022-12-18 RX ADMIN — DICLOFENAC 4 G: 10 GEL TOPICAL at 08:53

## 2022-12-18 RX ADMIN — MAGNESIUM GLUCONATE 500 MG ORAL TABLET 400 MG: 500 TABLET ORAL at 08:54

## 2022-12-18 RX ADMIN — DOCUSATE SODIUM 100 MG: 100 CAPSULE ORAL at 20:27

## 2022-12-18 RX ADMIN — DOCUSATE SODIUM 100 MG: 100 CAPSULE ORAL at 08:55

## 2022-12-18 RX ADMIN — DICLOFENAC 4 G: 10 GEL TOPICAL at 12:30

## 2022-12-18 RX ADMIN — FUROSEMIDE 40 MG: 40 TABLET ORAL at 08:54

## 2022-12-18 RX ADMIN — HYDRALAZINE HYDROCHLORIDE 5 MG: 25 TABLET, FILM COATED ORAL at 06:44

## 2022-12-18 RX ADMIN — APIXABAN 5 MG: 5 TABLET, FILM COATED ORAL at 20:27

## 2022-12-18 RX ADMIN — APIXABAN 5 MG: 5 TABLET, FILM COATED ORAL at 08:54

## 2022-12-18 RX ADMIN — DICLOFENAC 4 G: 10 GEL TOPICAL at 17:02

## 2022-12-18 RX ADMIN — METOPROLOL TARTRATE 25 MG: 25 TABLET, FILM COATED ORAL at 08:54

## 2022-12-18 RX ADMIN — PANTOPRAZOLE SODIUM 40 MG: 40 TABLET, DELAYED RELEASE ORAL at 06:44

## 2022-12-18 RX ADMIN — Medication 1 TABLET: at 08:54

## 2022-12-18 RX ADMIN — Medication 10 MG: at 20:27

## 2022-12-18 RX ADMIN — HYDRALAZINE HYDROCHLORIDE 5 MG: 25 TABLET, FILM COATED ORAL at 13:57

## 2022-12-18 RX ADMIN — HYDRALAZINE HYDROCHLORIDE 5 MG: 25 TABLET, FILM COATED ORAL at 21:46

## 2022-12-18 NOTE — PLAN OF CARE
Goal Outcome Evaluation:  Plan of Care Reviewed With: patient        Progress: improving/ Up in chair most of day.  Ambulated to bedside commode x3 today.  Co weakness.  No co pain.  Continue plan of care.

## 2022-12-18 NOTE — PROGRESS NOTES
[facility] HOSPITALISTS CROSS COVER NOTE    Patient Identification:  Name:  Tre Kinney  Age:  89 y.o.  Sex:  female  :  1932  MRN:  [id number]  Visit number:  [id number]  Primary Care Provider:  Jessica Pedro APRN    Length of stay in inpatient status:  74    Brief Update     Today, the patient states that she is doing okay; she was sitting in the bedside chair.  She had some shortness of air overnight and she needed 1 L for a sat of 89%.  Otherwise, she denies any chest pain, denies nausea, denies emesis, denies diarrhea, denies shortness of air currently.  She is not coughing.  On exam, she has clear lungs; she also has is in regular rate and rhythm.  Her ankles are swollen but they look about the same as the last few days.  Will obtain labs in the morning; if the ankles are still swollen and the Cr is stable, then our team will consider giving an extra dose of Lasix.  I reviewed the medication administration record and the patient has been receiving her scheduled twice a day Lasix.  Await the letter of reconsideration decision.    Jannet Lagunas MD  [facility]  22  17:11 EST

## 2022-12-18 NOTE — PLAN OF CARE
Goal Outcome Evaluation:      The patient has rested well overnight. She did not ambulate tonight, verbalized she was just too tired today. During the shift the patient attempted to ambulate to the bedside commode and in the room with myself and PCA, however she stated that her legs simply would not cooperate. Her legs were trembling and she could not safely ambulate at that time.Therefore we placed onto bedpan to void. Will attempt ambulation again this am. The patient's oxygen was 89/90% and she was short of breath therefore she was placed on 1 liter oxygen. Patient stated she has had to wear oxygen PRN at home, but the charting shows her not being on any oxygen the last couple weeks of her stay. The patient then ws 94% on 1 liter.She ask could she wear the oxygen the rest of the night to sleep. MIKAL Gonzales notified. Will attempt to get patient back to room air this am. Will continue with the plan of care.

## 2022-12-19 LAB
ALBUMIN SERPL-MCNC: 3.02 G/DL (ref 3.5–5.2)
ALBUMIN/GLOB SERPL: 1.1 G/DL
ALP SERPL-CCNC: 50 U/L (ref 39–117)
ALT SERPL W P-5'-P-CCNC: <5 U/L (ref 1–33)
ANION GAP SERPL CALCULATED.3IONS-SCNC: 12.6 MMOL/L (ref 5–15)
AST SERPL-CCNC: 6 U/L (ref 1–32)
BILIRUB SERPL-MCNC: 0.3 MG/DL (ref 0–1.2)
BUN SERPL-MCNC: 28 MG/DL (ref 8–23)
BUN/CREAT SERPL: 12.7 (ref 7–25)
CALCIUM SPEC-SCNC: 8.6 MG/DL (ref 8.2–9.6)
CHLORIDE SERPL-SCNC: 101 MMOL/L (ref 98–107)
CO2 SERPL-SCNC: 26.4 MMOL/L (ref 22–29)
CREAT SERPL-MCNC: 2.2 MG/DL (ref 0.57–1)
EGFRCR SERPLBLD CKD-EPI 2021: 20.8 ML/MIN/1.73
GLOBULIN UR ELPH-MCNC: 2.7 GM/DL
GLUCOSE SERPL-MCNC: 102 MG/DL (ref 65–99)
MAGNESIUM SERPL-MCNC: 2.1 MG/DL (ref 1.6–2.4)
NT-PROBNP SERPL-MCNC: ABNORMAL PG/ML (ref 0–1800)
PHOSPHATE SERPL-MCNC: 3.9 MG/DL (ref 2.5–4.5)
POTASSIUM SERPL-SCNC: 3.7 MMOL/L (ref 3.5–5.2)
PROT SERPL-MCNC: 5.7 G/DL (ref 6–8.5)
SODIUM SERPL-SCNC: 140 MMOL/L (ref 136–145)

## 2022-12-19 PROCEDURE — 97110 THERAPEUTIC EXERCISES: CPT

## 2022-12-19 PROCEDURE — 83880 ASSAY OF NATRIURETIC PEPTIDE: CPT | Performed by: INTERNAL MEDICINE

## 2022-12-19 PROCEDURE — 84100 ASSAY OF PHOSPHORUS: CPT | Performed by: INTERNAL MEDICINE

## 2022-12-19 PROCEDURE — 99308 SBSQ NF CARE LOW MDM 20: CPT | Performed by: INTERNAL MEDICINE

## 2022-12-19 PROCEDURE — 97116 GAIT TRAINING THERAPY: CPT

## 2022-12-19 PROCEDURE — 83735 ASSAY OF MAGNESIUM: CPT | Performed by: INTERNAL MEDICINE

## 2022-12-19 PROCEDURE — 80053 COMPREHEN METABOLIC PANEL: CPT | Performed by: INTERNAL MEDICINE

## 2022-12-19 RX ADMIN — Medication 10 MG: at 20:43

## 2022-12-19 RX ADMIN — DOCUSATE SODIUM 100 MG: 100 CAPSULE ORAL at 20:43

## 2022-12-19 RX ADMIN — GABAPENTIN 300 MG: 300 CAPSULE ORAL at 20:44

## 2022-12-19 RX ADMIN — DICLOFENAC 4 G: 10 GEL TOPICAL at 20:43

## 2022-12-19 RX ADMIN — ACETAMINOPHEN 650 MG: 325 TABLET, FILM COATED ORAL at 08:43

## 2022-12-19 RX ADMIN — PANTOPRAZOLE SODIUM 40 MG: 40 TABLET, DELAYED RELEASE ORAL at 05:38

## 2022-12-19 RX ADMIN — HYDRALAZINE HYDROCHLORIDE 5 MG: 25 TABLET, FILM COATED ORAL at 21:07

## 2022-12-19 RX ADMIN — METOPROLOL TARTRATE 25 MG: 25 TABLET, FILM COATED ORAL at 08:05

## 2022-12-19 RX ADMIN — FUROSEMIDE 40 MG: 40 TABLET ORAL at 08:05

## 2022-12-19 RX ADMIN — Medication 1 TABLET: at 08:05

## 2022-12-19 RX ADMIN — APIXABAN 5 MG: 5 TABLET, FILM COATED ORAL at 08:05

## 2022-12-19 RX ADMIN — TAMSULOSIN HYDROCHLORIDE 0.4 MG: 0.4 CAPSULE ORAL at 08:05

## 2022-12-19 RX ADMIN — ESCITALOPRAM 10 MG: 10 TABLET, FILM COATED ORAL at 08:05

## 2022-12-19 RX ADMIN — FUROSEMIDE 40 MG: 40 TABLET ORAL at 17:55

## 2022-12-19 RX ADMIN — METOPROLOL TARTRATE 25 MG: 25 TABLET, FILM COATED ORAL at 20:43

## 2022-12-19 RX ADMIN — MAGNESIUM GLUCONATE 500 MG ORAL TABLET 400 MG: 500 TABLET ORAL at 08:05

## 2022-12-19 RX ADMIN — APIXABAN 5 MG: 5 TABLET, FILM COATED ORAL at 20:43

## 2022-12-19 RX ADMIN — DICLOFENAC 4 G: 10 GEL TOPICAL at 17:55

## 2022-12-19 RX ADMIN — HYDRALAZINE HYDROCHLORIDE 5 MG: 25 TABLET, FILM COATED ORAL at 05:38

## 2022-12-19 RX ADMIN — DOCUSATE SODIUM 100 MG: 100 CAPSULE ORAL at 08:05

## 2022-12-19 RX ADMIN — HYDRALAZINE HYDROCHLORIDE 5 MG: 25 TABLET, FILM COATED ORAL at 14:31

## 2022-12-19 NOTE — THERAPY TREATMENT NOTE
Acute Care - Occupational Therapy Treatment Note   Scot     Patient Name: Tre Kinney  : 1932  MRN: [id number]  Today's Date: 2022  Onset of Illness/Injury or Date of Surgery: 10/06/22     Referring Physician: Janneth    Admit Date: 10/5/2022     No diagnosis found.  Patient Active Problem List   Diagnosis   • Acid reflux   • Ankle arthralgia   • Cardiac conduction disorder   • Closed fracture of distal fibula   • Arteriosclerosis of coronary artery   • Fracture of distal end of tibia   • Elevated cholesterol   • BP (high blood pressure)   • Hypertrophic polyarthritis   • OP (osteoporosis)   • Right heart failure (HCC)   • Heart failure, chronic, right-sided (HCC)   • SOB (shortness of breath)   • Palpitations   • CHF (congestive heart failure) (HCC)   • Congestive heart failure (HCC)   • Precordial pain   • Chronic anticoagulation   • Hypokalemia   • Anemia, unspecified type   • Generalized weakness     Past Medical History:   Diagnosis Date   • Anemia    • Arthritis    • CHF (congestive heart failure) ([facility])    • Chronic kidney disease    • Coronary artery disease    • H/O blood clots    • History of transfusion     no reaction    • Hypertension      Past Surgical History:   Procedure Laterality Date   • ABDOMINAL SURGERY     • APPENDECTOMY     • CARDIAC SURGERY     •  SECTION     • COLONOSCOPY N/A 3/7/2018    Procedure: COLONOSCOPY;  Surgeon: Willie Gresham MD;  Location:  [facility];  Service:    • CORONARY ANGIOPLASTY WITH STENT PLACEMENT     • ENDOSCOPY N/A 3/6/2018    Procedure: ESOPHAGOGASTRODUODENOSCOPY;  Surgeon: Willie Gresham MD;  Location:  [facility];  Service:    • PACEMAKER IMPLANTATION      left side    • SKIN BIOPSY     • TUBAL ABDOMINAL LIGATION           OT ASSESSMENT FLOWSHEET (last 12 hours)     OT Evaluation and Treatment     Row Name 22 1354                   OT Time and Intention    Subjective Information no complaints  -KP        Document Type  therapy note (daily note)  -KP        Mode of Treatment individual therapy;occupational therapy  -KP        Total Minutes, Occupational Therapy 47  -KP        Patient Effort good  -KP        Symptoms Noted During/After Treatment none  -KP        Comment Patient seen for OT treatment session from 12:50 to 13:37 on this date. PT present due to assist with functional mobility X2 persons for safety. Attempted to call patient's son but he did not answer and later called patient to state he saw the missed Facetime but did not feel well on this date. She was able to perform functional mobility with rolling walker X2 attempts within room. Then completed seated BUE exercises with fair plus tolerance.  -           General Information    Patient Profile Reviewed yes  -KP        Existing Precautions/Restrictions fall  -KP        Limitations/Impairments safety/cognitive  -KP        Equipment Issued to Patient gait belt  -           Pain Assessment    Pretreatment Pain Rating 0/10 - no pain  -KP        Posttreatment Pain Rating 0/10 - no pain  -KP           Cognition    Affect/Mental Status (Cognition) WFL  -KP        Orientation Status (Cognition) oriented x 4  -KP        Follows Commands (Cognition) WFL  -KP        Personal Safety Interventions fall prevention program maintained  -KP           Sit-Stand Transfer    Sit-Stand [place] (Transfers) minimum assist (75% patient effort);moderate assist (50% patient effort);1 person assist  -KP           Stand-Sit Transfer    Stand-Sit [place] (Transfers) minimum assist (75% patient effort);1 person assist  -KP           Motor Skills    Functional Endurance fair  -KP        Therapeutic Exercise other (see comments)  BUE exercises with red resistance band while seated X20 reps X2 sets (shoulder flexion AROM X 20 reps X2 sets)  -KP           Plan of Care Review    Plan of Care Reviewed With patient  -KP        Progress improving  -KP              User Key  (r) = Recorded  By, (t) = Taken By, (c) = Cosigned By    Initials Name Effective Dates    Linn Richardson, OT 06/16/21 -                        OT Recommendation and Plan     Progress Toward Functional Goals (OT): progress toward functional goals is gradual  Plan of Care Review  Plan of Care Reviewed With: patient  Progress: improving  Outcome Evaluation: Patient seen for OT treatment session 8:55 to 9:44. Initial session addressed functional transfers/mobility with PT present. Then OT addressed UE strength, endurance, and ROM through seated exercises. She then was seen for ADL retraining as documented for toileting. Continue plan of care.  Plan of Care Reviewed With: patient  Outcome Evaluation: Patient seen for OT treatment session 8:55 to 9:44. Initial session addressed functional transfers/mobility with PT present. Then OT addressed UE strength, endurance, and ROM through seated exercises. She then was seen for ADL retraining as documented for toileting. Continue plan of care.     Outcome Measures     Row Name 12/19/22 1401 12/17/22 1415 12/17/22 1300       How much help from another person do you currently need...    Turning from your back to your side while in flat bed without using bedrails? -- 3  -KH --    Moving from lying on back to sitting on the side of a flat bed without bedrails? -- 3  -KH --    Moving to and from a bed to a chair (including a wheelchair)? -- 3  -KH --    Standing up from a chair using your arms (e.g., wheelchair, bedside chair)? -- 2  -KH --    Climbing 3-5 steps with a railing? -- 2  -KH --    To walk in hospital room? -- 3  -KH --    AM-PAC 6 Clicks Score (PT) -- 16  -KH --       How much help from another is currently needed...    Putting on and taking off regular lower body clothing? 2  -KP -- 2  -LA    Bathing (including washing, rinsing, and drying) 3  -KP -- 3  -LA    Toileting (which includes using toilet bed pan or urinal) 3  -KP -- 3  -LA    Putting on and taking off regular upper body  clothing 3  -KP -- 3  -LA    Taking care of personal grooming (such as brushing teeth) 3  -KP -- 3  -LA    Eating meals 4  -KP -- 4  -LA    AM-PAC 6 Clicks Score (OT) 18  -KP -- 18  -LA       Functional Assessment    Outcome Measure Options -- AM-PAC 6 Clicks Basic Mobility (PT)  -KH AM-PAC 6 Clicks Daily Activity (OT)  -LA    Row Name 12/16/22 1600 12/16/22 1500          How much help from another person do you currently need...    Turning from your back to your side while in flat bed without using bedrails? 3  -LL --     Moving from lying on back to sitting on the side of a flat bed without bedrails? 3  -LL --     Moving to and from a bed to a chair (including a wheelchair)? 3  -LL --     Standing up from a chair using your arms (e.g., wheelchair, bedside chair)? 3  -LL --     Climbing 3-5 steps with a railing? 1  -LL --     To walk in hospital room? 2  -LL --     AM-PAC 6 Clicks Score (PT) 15  -LL --        How much help from another is currently needed...    Putting on and taking off regular lower body clothing? -- 2  -LM     Bathing (including washing, rinsing, and drying) -- 3  -LM     Toileting (which includes using toilet bed pan or urinal) -- 3  -LM     Putting on and taking off regular upper body clothing -- 3  -LM     Taking care of personal grooming (such as brushing teeth) -- 3  -LM     Eating meals -- 4  -LM     AM-PAC 6 Clicks Score (OT) -- 18  -LM        Functional Assessment    Outcome Measure Options AM-PAC 6 Clicks Basic Mobility (PT)  -LL AM-PAC 6 Clicks Daily Activity (OT)  -LM           User Key  (r) = Recorded By, (t) = Taken By, (c) = Cosigned By    Initials Name Provider Type    LL Neda Garcia, PTA Physical Therapist Assistant    Татьяна Mccabe, OT Occupational Therapist    Linn Richardson, OT Occupational Therapist    Radha Yusuf, PT Physical Therapist    Dianna Barreto, OT Occupational Therapist                Time Calculation:    Time Calculation- OT     Row Name  12/19/22 1402             Time Calculation- OT    OT Start Time 1250  -      OT Stop Time 1337  -KP      OT Time Calculation (min) 47 min  -      Total Timed Code Minutes- OT 47 minute(s)  -KP      OT Received On 12/19/22  -         Timed Charges    94596 - OT Therapeutic Exercise Minutes 14  -KP      77887 - OT Therapeutic Activity Minutes 5  -KP         Total Minutes    Timed Charges Total Minutes 19  -KP       Total Minutes 19  -KP            User Key  (r) = Recorded By, (t) = Taken By, (c) = Cosigned By    Initials Name Provider Type    Linn Richardson OT Occupational Therapist              Therapy Charges for Today     Code Description Service Date Service Provider Modifiers Qty    57015032242 HC OT THER PROC EA 15 MIN 12/19/2022 Linn Quevedo OT GO 1               Linn Quevedo OT  12/19/2022

## 2022-12-19 NOTE — PLAN OF CARE
Goal Outcome Evaluation:      The patient has rested in bed overnight. No acute events or distress overnight. She did not require oxygen overnight. Spot check of oxygen has remained above 94% on room air. She did not ambulate this shift, nor sit up in chair. She stated she was too tired. I will continue to encourage ambulation. Patient is still anxious to get back home with her family. Will continue with plan of care.             Pt states he has not taken his home medications for at least the last few weeks to a month per pt.

## 2022-12-19 NOTE — PROGRESS NOTES
[facility] HOSPITALIST PROGRESS NOTE    Subjective     History:   Tre Kinney is a 89 y.o. female admitted on 10/5/2022 secondary to Generalized weakness     Procedures: None    CC: Follow up debility    Patient seen and examined with Yakelin, RN. Awake and alert. Sitting in bedside chair during my encounter. Leg pain appears stable. Reports some episodes of dry cough and dyspnea. No reported CP. Reports episodes of worsening generalized weakness.  No acute events overnight per RN.     History taken from: patient, chart, and RN.      Objective     Vital Signs  Temp:  [98.1 °F (36.7 °C)] 98.1 °F (36.7 °C)  Heart Rate:  [75-86] 86  Resp:  [16-18] 18  BP: (132-141)/(69-75) 132/69    Intake/Output Summary (Last 24 hours) at 12/19/2022 1733  Last data filed at 12/19/2022 1400  Gross per 24 hour   Intake 960 ml   Output --   Net 960 ml         Physical Exam: Unchanged from previous.   General:    Awake, alert, in no acute distress, chronically ill appearing   Heart:      Normal S1 and S2. Regular rate and rhythm. No significant murmur, rubs or gallops appreciated.   Lungs:     Respirations regular, even and unlabored. Lungs clear to auscultation B/L. No wheezes, rales or rhonchi.   Abdomen:   Soft and nontender. No guarding, rebound tenderness or  organomegaly noted. Bowel sounds present x 4.   Extremities:  Trace bilateral pedal edema. Moves UE and LE equally B/L with generalized weakness.     Results Review:    Results from last 7 days   Lab Units 12/14/22  0049   WBC 10*3/mm3 5.92   HEMOGLOBIN g/dL 9.7*   PLATELETS 10*3/mm3 178     Results from last 7 days   Lab Units 12/19/22  0209 12/14/22  0049   SODIUM mmol/L 140 138   POTASSIUM mmol/L 3.7 3.8   CHLORIDE mmol/L 101 100   CO2 mmol/L 26.4 27.2   BUN mg/dL 28* 33*   CREATININE mg/dL 2.20* 2.20*   CALCIUM mg/dL 8.6 8.5   GLUCOSE mg/dL 102* 107*     Results from last 7 days   Lab Units 12/19/22  0209 12/14/22  0049   BILIRUBIN mg/dL 0.3 0.2   ALK PHOS U/L  50 51   AST (SGOT) U/L 6 10   ALT (SGPT) U/L <5 6     Results from last 7 days   Lab Units 12/19/22  0209 12/14/22  0049   MAGNESIUM mg/dL 2.1 1.8               Imaging Results (Last 24 Hours)     Procedure Component Value Units Date/Time    XR Chest 1 View [[id number]] Collected: 12/19/22 1448     Updated: 12/19/22 1450    Narrative:      EXAM:    XR Chest, 1 View     EXAM DATE:    12/19/2022 2:03 PM     CLINICAL HISTORY:    Cough and dyspnea     TECHNIQUE:    Frontal view of the chest.     COMPARISON:    11/05/2022     FINDINGS:    Lungs:  Unremarkable.  No consolidation.    Pleural space:  Unremarkable.  No pneumothorax.    Heart:  Cardiomegaly.    Mediastinum:  Large hiatal hernia.    Bones/joints:  Bony structures are stable.    Tubes, lines and devices:  Left cardiac pacer device.       Impression:      1.  Cardiomegaly. No acute changes.  2.  Large hiatal hernia again noted.     This report was finalized on 12/19/2022 2:48 PM by Dr. Andres Guerra MD.               Medications:  apixaban, 5 mg, Oral, Q12H  Diclofenac Sodium, 4 g, Topical, 4x Daily  docusate sodium, 100 mg, Oral, BID  escitalopram, 10 mg, Oral, Daily  furosemide, 40 mg, Oral, BID  gabapentin, 300 mg, Oral, Nightly  hydrALAZINE, 5 mg, Oral, Q8H  magnesium citrate, 296 mL, Oral, Once  magnesium oxide, 400 mg, Oral, Daily  melatonin, 10 mg, Oral, Nightly  metoprolol tartrate, 25 mg, Oral, Q12H  multivitamin, 1 tablet, Oral, Daily  pantoprazole, 40 mg, Oral, Q AM  sodium chloride, 10 mL, Intravenous, Q12H  sodium chloride, 10 mL, Intravenous, Q12H  tamsulosin, 0.4 mg, Oral, Daily               Assessment & Plan   Generalized weakness/debility  Diffuse LLE DVT  DIANA on CKD IV  Acute blood loss anemia  Traumatic right gluteal hematoma  Mild hypokalemia   Paroxysmal Afib  Hx of SSS s/p pacemaker placement  Essential HTN  Reported hx of DVT's   Anxiety/depression  Advanced age    Plan  BP stable and controlled today on current regimen. Tolerating  Eliquis with no reported signs of bleeding. Labs reviewed with overall stable renal function. Obtained CXR with no acute findings noted. Will hold on any additional diuresis today. Cont PT/OT as tolerated.     Disposition: Letter of reconsideration awaiting insurance response.            Mustapha Loredo,   12/19/22  17:33 EST

## 2022-12-19 NOTE — CASE MANAGEMENT/SOCIAL WORK
Discharge Planning Assessment   [facility]     Patient Name: Tre Kinney  MRN: [id number]  Today's Date: 12/19/2022    Admit Date: 10/5/2022    Plan: Pt was admitted to swing bed on 10/5/22. Letter of reconsideration is currently pending awaiting insurance response. SS to follow and assist as needed.      Discharge Plan     Row Name 12/19/22 1351       Plan    Plan Pt was admitted to swing bed on 10/5/22. Letter of reconsideration is currently pending awaiting insurance response. SS to follow and assist as needed.                TERESITA WillsW

## 2022-12-19 NOTE — THERAPY TREATMENT NOTE
Acute Care - Physical Therapy Treatment Note   Scot     Patient Name: Tre Kinney  : 1932  MRN: [id number]  Today's Date: 2022   Onset of Illness/Injury or Date of Surgery: 10/06/22  Visit Dx:   No diagnosis found.  Patient Active Problem List   Diagnosis   • Acid reflux   • Ankle arthralgia   • Cardiac conduction disorder   • Closed fracture of distal fibula   • Arteriosclerosis of coronary artery   • Fracture of distal end of tibia   • Elevated cholesterol   • BP (high blood pressure)   • Hypertrophic polyarthritis   • OP (osteoporosis)   • Right heart failure (HCC)   • Heart failure, chronic, right-sided (HCC)   • SOB (shortness of breath)   • Palpitations   • CHF (congestive heart failure) (HCC)   • Congestive heart failure (HCC)   • Precordial pain   • Chronic anticoagulation   • Hypokalemia   • Anemia, unspecified type   • Generalized weakness     Past Medical History:   Diagnosis Date   • Anemia    • Arthritis    • CHF (congestive heart failure) (HCC)    • Chronic kidney disease    • Coronary artery disease    • H/O blood clots    • History of transfusion     no reaction    • Hypertension      Past Surgical History:   Procedure Laterality Date   • ABDOMINAL SURGERY     • APPENDECTOMY     • CARDIAC SURGERY     •  SECTION     • COLONOSCOPY N/A 3/7/2018    Procedure: COLONOSCOPY;  Surgeon: Willie Gresham MD;  Location:  [facility];  Service:    • CORONARY ANGIOPLASTY WITH STENT PLACEMENT     • ENDOSCOPY N/A 3/6/2018    Procedure: ESOPHAGOGASTRODUODENOSCOPY;  Surgeon: Willie Gresham MD;  Location:  [facility];  Service:    • PACEMAKER IMPLANTATION      left side    • SKIN BIOPSY     • TUBAL ABDOMINAL LIGATION       PT Assessment (last 12 hours)     PT Evaluation and Treatment     Row Name 22 1250          Physical Therapy Time and Intention    Subjective Information no complaints  -LL     Document Type therapy note (daily note)  -LL     Mode of Treatment physical  therapy;individual therapy  -LL     Patient Effort good  -LL     Comment Patient seen for PT session this date 12:50-13:37 with OT present to assist with functional mobility for safety.  Attempted to facetime patient's son, Willian, but he did  not answer.  He later phoned patient in room and stated that he was no feeling well and that he missed the facetime call.  Patient did ambulation x 2 trials this date and performed sittig B LE ex's.  -     Row Name 12/19/22 1250          General Information    Patient Profile Reviewed yes  -LL     Row Name 12/19/22 1250          Cognition    Affect/Mental Status (Cognition) WFL  -LL     Orientation Status (Cognition) oriented x 4  -LL     Follows Commands (Cognition) WFL  -LL     Personal Safety Interventions fall prevention program maintained;gait belt;nonskid shoes/slippers when out of bed;supervised activity  -LL     Row Name 12/19/22 1250          Bed Mobility    Comment, (Bed Mobility) Patient UIC  -LL     Row Name 12/19/22 1250          Transfers    Transfers sit-stand transfer;stand-sit transfer  -     Row Name 12/19/22 1250          Sit-Stand Transfer    Sit-Stand [place] (Transfers) minimum assist (75% patient effort);verbal cues;moderate assist (50% patient effort)  For proper hand placement  -LL     Assistive Device (Sit-Stand Transfers) walker, front-wheeled  -LL     Comment, (Sit-Stand Transfer) x 2 trials  -LL     Row Name 12/19/22 1250          Stand-Sit Transfer    Stand-Sit [place] (Transfers) moderate assist (50% patient effort);minimum assist (75% patient effort)  with verbal cueing to reach back for chair  -LL     Assistive Device (Stand-Sit Transfers) walker, front-wheeled  -LL     Comment, (Stand-Sit Transfer) x 2 trials  -LL     Row Name 12/19/22 1250          Gait/Stairs (Locomotion)    [place] Level (Gait) moderate assist (50% patient effort);verbal cues;nonverbal cues (demo/gesture);minimum assist (75% patient effort);1 person  assist;1 person to manage equipment  -LL     Assistive Device (Gait) walker, front-wheeled  -LL     Distance in Feet (Gait) 17', 20'  -LL     Pattern (Gait) step-to  -LL     Deviations/Abnormal Patterns (Gait) stride length decreased;gait speed decreased;festinating/shuffling;weight shifting decreased  -LL     Bilateral Gait Deviations weight shift ability decreased;forward flexed posture  -LL     Row Name 12/19/22 1250          Hip (Therapeutic Exercise)    Hip Strengthening (Therapeutic Exercise) bilateral;flexion;extension;aBduction;aDduction;marching while seated;sitting;resistance band;red  GS  -LL     Row Name 12/19/22 1250          Knee (Therapeutic Exercise)    Knee Strengthening (Therapeutic Exercise) bilateral;extension;flexion;marching while seated;LAQ (long arc quad);hamstring curls;sitting;resistance band;red  -LL     Row Name 12/19/22 1250          Ankle (Therapeutic Exercise)    Ankle Strengthening (Therapeutic Exercise) bilateral;dorsiflexion;plantarflexion;sitting  -LL     Row Name 12/19/22 1250          Coping    Observed Emotional State calm;cooperative;pleasant  -LL     Verbalized Emotional State acceptance  -LL     Row Name 12/19/22 1250          Positioning and Restraints    Pre-Treatment Position sitting in chair/recliner  -LL     Post Treatment Position chair  -LL     In Chair reclined;call light within reach;encouraged to call for assist  With tray table in front of her  -LL           User Key  (r) = Recorded By, (t) = Taken By, (c) = Cosigned By    Initials Name Provider Type    Neda Kyle PTA Physical Therapist Assistant                Physical Therapy Education     Title: PT OT SLP Therapies (Done)     Topic: Physical Therapy (Done)     Point: Mobility training (Done)     Learning Progress Summary           Patient Acceptance, E, VU by CL at 12/17/2022 1703    Acceptance, E,D, VU,NR by LL at 12/16/2022 1605    Acceptance, E,TB, VU by AC at 12/16/2022 1523    Acceptance, E,TB, VU  by AC at 12/15/2022 1618    Acceptance, E,TB, VU by AC at 12/14/2022 1503    Acceptance, E,TB, VU by DL at 12/13/2022 0240    Acceptance, D,E, VU,NR by LL at 12/12/2022 1455    Acceptance, E,TB, VU by RW at 12/9/2022 1451    Acceptance, E,TB, VU by BC at 11/27/2022 1319    Acceptance, E,TB, VU by RM at 11/20/2022 1449    Acceptance, E,TB, VU by RM at 11/18/2022 1021    Acceptance, E,TB, VU by RH at 11/16/2022 1110    Acceptance, E,TB, VU by KK at 11/14/2022 1638    Acceptance, E,TB, VU by KK at 11/13/2022 1648    Acceptance, E,TB, VU by KG at 11/10/2022 1827    Acceptance, E,TB, VU by DM at 11/9/2022 1638    Acceptance, E,TB, VU by KK at 11/8/2022 1754    Acceptance, E,TB, VU by KK at 11/7/2022 1635    Acceptance, E,D, VU,NR by LL at 11/5/2022 1325    Acceptance, E, VU by EB at 11/4/2022 1448    Acceptance, E,TB, VU by KM at 11/4/2022 1101   Family Acceptance, D,E, VU,NR by LL at 12/12/2022 1455                   Point: Home exercise program (Done)     Learning Progress Summary           Patient Acceptance, E, VU by CL at 12/17/2022 1703    Acceptance, E,D, VU,NR by LL at 12/16/2022 1605    Acceptance, E,TB, VU by AC at 12/16/2022 1523    Acceptance, E,TB, VU by AC at 12/15/2022 1618    Acceptance, E,TB, VU by AC at 12/14/2022 1503    Acceptance, E,TB, VU by DL at 12/13/2022 0240    Acceptance, D,E, VU,NR by LL at 12/12/2022 1455    Acceptance, E,TB, VU by RW at 12/9/2022 1451    Acceptance, E,TB, VU by BC at 11/27/2022 1319    Acceptance, E,TB, VU by RM at 11/20/2022 1449    Acceptance, E,TB, VU by RM at 11/18/2022 1021    Acceptance, E,TB, VU by RH at 11/16/2022 1110    Acceptance, E,TB, VU by KK at 11/14/2022 1638    Acceptance, E,TB, VU by KK at 11/13/2022 1648    Acceptance, E,TB, VU by KG at 11/10/2022 1827    Acceptance, E,TB, VU by DM at 11/9/2022 1638    Acceptance, E,TB, VU by KK at 11/8/2022 1754    Acceptance, E,TB, VU by KK at 11/7/2022 1635    Acceptance, E,D, VU,NR by LL at 11/5/2022 1325     Acceptance, E, VU by EB at 11/4/2022 1448    Acceptance, E,TB, VU by KM at 11/4/2022 1101   Family Acceptance, D,E, VU,NR by LL at 12/12/2022 1455                   Point: Body mechanics (Done)     Learning Progress Summary           Patient Acceptance, E, VU by CL at 12/17/2022 1703    Acceptance, E,D, VU,NR by LL at 12/16/2022 1605    Acceptance, E,TB, VU by AC at 12/16/2022 1523    Acceptance, E,TB, VU by AC at 12/15/2022 1618    Acceptance, E,TB, VU by AC at 12/14/2022 1503    Acceptance, E,TB, VU by DL at 12/13/2022 0240    Acceptance, D,E, VU,NR by LL at 12/12/2022 1455    Acceptance, E,TB, VU by RW at 12/9/2022 1451    Acceptance, E,TB, VU by BC at 11/27/2022 1319    Acceptance, E,TB, VU by RM at 11/20/2022 1449    Acceptance, E,TB, VU by RM at 11/18/2022 1021    Acceptance, E,TB, VU by RH at 11/16/2022 1110    Acceptance, E,TB, VU by KK at 11/14/2022 1638    Acceptance, E,TB, VU by KK at 11/13/2022 1648    Acceptance, E,TB, VU by KG at 11/10/2022 1827    Acceptance, E,TB, VU by DM at 11/9/2022 1638    Acceptance, E,TB, VU by KK at 11/8/2022 1754    Acceptance, E,TB, VU by KK at 11/7/2022 1635    Acceptance, E,D, VU,NR by LL at 11/5/2022 1325    Acceptance, E, VU by EB at 11/4/2022 1448    Acceptance, E,TB, VU by KM at 11/4/2022 1101   Family Acceptance, D,E, VU,NR by LL at 12/12/2022 1455                   Point: Precautions (Done)     Learning Progress Summary           Patient Acceptance, E, VU by CL at 12/17/2022 1703    Acceptance, E,D, VU,NR by LL at 12/16/2022 1605    Acceptance, E,TB, VU by AC at 12/16/2022 1523    Acceptance, E,TB, VU by AC at 12/15/2022 1618    Acceptance, E,TB, VU by AC at 12/14/2022 1503    Acceptance, E,TB, VU by DL at 12/13/2022 0240    Acceptance, D,E, VU,NR by LL at 12/12/2022 1455    Acceptance, E,TB, VU by RW at 12/9/2022 1451    Acceptance, E,TB, VU by BC at 11/27/2022 1319    Acceptance, E,TB, VU by RM at 11/20/2022 1449    Acceptance, E,TB, VU by RM at 11/18/2022 1021     Acceptance, E,TB, VU by RH at 11/16/2022 1110    Acceptance, E,TB, VU by KK at 11/14/2022 1638    Acceptance, E,TB, VU by KK at 11/13/2022 1648    Acceptance, E,TB, VU by KG at 11/10/2022 1827    Acceptance, E,TB, VU by DM at 11/9/2022 1638    Acceptance, E,TB, VU by KK at 11/8/2022 1754    Acceptance, E,TB, VU by KK at 11/7/2022 1635    Acceptance, E,D, VU,NR by LL at 11/5/2022 1325    Acceptance, E, VU by EB at 11/4/2022 1448    Acceptance, E,TB, VU by KM at 11/4/2022 1101   Family Acceptance, D,E, VU,NR by LL at 12/12/2022 1455                               User Key     Initials Effective Dates Name Provider Type Discipline    CL 06/16/21 -  Mitzy Mustafa, RN Registered Nurse Nurse    LL 05/02/16 -  Neda Garcia, PTA Physical Therapist Assistant PT    RM 06/14/22 -  Bethany Krause, RN Registered Nurse Nurse    DM 06/16/21 -  Moniz, Deborah, RN Registered Nurse Nurse    BC 08/02/22 -  Collett, Brittany, RN Registered Nurse Nurse    AC 10/13/22 -  Jaya Castillo, SONAM Registered Nurse Nurse    KM 05/24/22 -  Mao Vázquez, PT Physical Therapist PT    EB 09/22/22 -  Cedric Oliver, RN Registered Nurse Nurse    KK 06/27/22 -  Chelsi Bolivar, RN Registered Nurse Nurse    DL 03/16/22 -  Francy Garcia, RN Registered Nurse Nurse    KG 09/22/22 -  Verónica Rodriguez, RN Registered Nurse Nurse    RW 09/22/22 -  Bethany Schofield, RN Registered Nurse Nurse    RH 08/03/22 -  Jung Russell RN Extern Registered Nurse Nurse              PT Recommendation and Plan         Outcome Measures     Row Name 12/19/22 1500 12/19/22 1401 12/17/22 1415       How much help from another person do you currently need...    Turning from your back to your side while in flat bed without using bedrails? 3  -LL -- 3  -KH    Moving from lying on back to sitting on the side of a flat bed without bedrails? 3  -LL -- 3  -KH    Moving to and from a bed to a chair (including a wheelchair)? 2  -LL -- 3  -KH    Standing up from a chair using your  arms (e.g., wheelchair, bedside chair)? 2  -LL -- 2  -KH    Climbing 3-5 steps with a railing? 2  -LL -- 2  -KH    To walk in hospital room? 2  -LL -- 3  -KH    AM-PAC 6 Clicks Score (PT) 14  -LL -- 16  -KH       How much help from another is currently needed...    Putting on and taking off regular lower body clothing? -- 2  -KP --    Bathing (including washing, rinsing, and drying) -- 3  -KP --    Toileting (which includes using toilet bed pan or urinal) -- 3  -KP --    Putting on and taking off regular upper body clothing -- 3  -KP --    Taking care of personal grooming (such as brushing teeth) -- 3  -KP --    Eating meals -- 4  -KP --    AM-PAC 6 Clicks Score (OT) -- 18  -KP --       Functional Assessment    Outcome Measure Options AM-PAC 6 Clicks Basic Mobility (PT)  -LL -- AM-[facility] 6 Clicks Basic Mobility (PT)  -    Row Name 12/17/22 1300 12/16/22 1600          How much help from another person do you currently need...    Turning from your back to your side while in flat bed without using bedrails? -- 3  -LL     Moving from lying on back to sitting on the side of a flat bed without bedrails? -- 3  -LL     Moving to and from a bed to a chair (including a wheelchair)? -- 3  -LL     Standing up from a chair using your arms (e.g., wheelchair, bedside chair)? -- 3  -LL     Climbing 3-5 steps with a railing? -- 1  -LL     To walk in hospital room? -- 2  -LL     AM-PAC 6 Clicks Score (PT) -- 15  -LL        How much help from another is currently needed...    Putting on and taking off regular lower body clothing? 2  -LA --     Bathing (including washing, rinsing, and drying) 3  -LA --     Toileting (which includes using toilet bed pan or urinal) 3  -LA --     Putting on and taking off regular upper body clothing 3  -LA --     Taking care of personal grooming (such as brushing teeth) 3  -LA --     Eating meals 4  -LA --     AM-PAC 6 Clicks Score (OT) 18  -LA --        Functional Assessment    Outcome Measure Options  AM-PAC 6 Clicks Daily Activity (OT)  -LA AM-PAC 6 Clicks Basic Mobility (PT)  -LL           User Key  (r) = Recorded By, (t) = Taken By, (c) = Cosigned By    Initials Name Provider Type    LL Neda Garcia PTA Physical Therapist Assistant    Linn Richardson, OT Occupational Therapist    Radha Yusuf, PT Physical Therapist    Dianna Barreto, OT Occupational Therapist                 Time Calculation:    PT Charges     Row Name 12/19/22 1521             Time Calculation    PT Received On 12/19/22  -LL         Time Calculation- PT    Total Timed Code Minutes- PT 30 minute(s)  -LL            User Key  (r) = Recorded By, (t) = Taken By, (c) = Cosigned By    Initials Name Provider Type    Neda Kyle PTA Physical Therapist Assistant              Therapy Charges for Today     Code Description Service Date Service Provider Modifiers Qty    74243975576 HC GAIT TRAINING EA 15 MIN 12/19/2022 Neda Garcia PTA GP, CQ 1    81588289488 HC PT THER PROC EA 15 MIN 12/19/2022 Neda Garcia PTA GP, CQ 1          PT G-Codes  Outcome Measure Options: AM-PAC 6 Clicks Basic Mobility (PT)  AM-PAC 6 Clicks Score (PT): 14  AM-PAC 6 Clicks Score (OT): 18    Hannah Garcia PTA  12/19/2022

## 2022-12-19 NOTE — PLAN OF CARE
Goal Outcome Evaluation:  Plan of Care Reviewed With: patient        Progress: improving    States feeling better.  Smiling and talkative.  No complaints pain.  No acute distress.  Continue plan of care.

## 2022-12-20 PROCEDURE — 97110 THERAPEUTIC EXERCISES: CPT

## 2022-12-20 PROCEDURE — 97116 GAIT TRAINING THERAPY: CPT

## 2022-12-20 RX ADMIN — FUROSEMIDE 40 MG: 40 TABLET ORAL at 18:13

## 2022-12-20 RX ADMIN — TAMSULOSIN HYDROCHLORIDE 0.4 MG: 0.4 CAPSULE ORAL at 08:10

## 2022-12-20 RX ADMIN — DOCUSATE SODIUM 100 MG: 100 CAPSULE ORAL at 08:10

## 2022-12-20 RX ADMIN — APIXABAN 5 MG: 5 TABLET, FILM COATED ORAL at 20:35

## 2022-12-20 RX ADMIN — DOCUSATE SODIUM 100 MG: 100 CAPSULE ORAL at 20:35

## 2022-12-20 RX ADMIN — Medication 1 TABLET: at 08:10

## 2022-12-20 RX ADMIN — HYDRALAZINE HYDROCHLORIDE 5 MG: 25 TABLET, FILM COATED ORAL at 05:04

## 2022-12-20 RX ADMIN — ESCITALOPRAM 10 MG: 10 TABLET, FILM COATED ORAL at 08:10

## 2022-12-20 RX ADMIN — DICLOFENAC 4 G: 10 GEL TOPICAL at 08:10

## 2022-12-20 RX ADMIN — METOPROLOL TARTRATE 25 MG: 25 TABLET, FILM COATED ORAL at 08:10

## 2022-12-20 RX ADMIN — APIXABAN 5 MG: 5 TABLET, FILM COATED ORAL at 08:10

## 2022-12-20 RX ADMIN — DICLOFENAC 4 G: 10 GEL TOPICAL at 18:12

## 2022-12-20 RX ADMIN — DICLOFENAC 4 G: 10 GEL TOPICAL at 11:34

## 2022-12-20 RX ADMIN — MAGNESIUM GLUCONATE 500 MG ORAL TABLET 400 MG: 500 TABLET ORAL at 08:10

## 2022-12-20 RX ADMIN — DICLOFENAC 4 G: 10 GEL TOPICAL at 20:35

## 2022-12-20 RX ADMIN — FUROSEMIDE 40 MG: 40 TABLET ORAL at 08:10

## 2022-12-20 RX ADMIN — HYDRALAZINE HYDROCHLORIDE 5 MG: 25 TABLET, FILM COATED ORAL at 22:10

## 2022-12-20 RX ADMIN — PANTOPRAZOLE SODIUM 40 MG: 40 TABLET, DELAYED RELEASE ORAL at 05:04

## 2022-12-20 RX ADMIN — Medication 10 MG: at 20:35

## 2022-12-20 RX ADMIN — METOPROLOL TARTRATE 25 MG: 25 TABLET, FILM COATED ORAL at 20:35

## 2022-12-20 RX ADMIN — HYDRALAZINE HYDROCHLORIDE 5 MG: 25 TABLET, FILM COATED ORAL at 13:57

## 2022-12-20 RX ADMIN — GABAPENTIN 300 MG: 300 CAPSULE ORAL at 20:35

## 2022-12-20 NOTE — CASE MANAGEMENT/SOCIAL WORK
Discharge Planning Assessment  CODY Ellison     Patient Name: Tre Kinney  MRN: [id number]  Today's Date: 12/20/2022    Admit Date: 10/5/2022       Discharge Plan     Row Name 12/20/22 1055       Plan    Plan SS received call from Swing bed RN who states letter of reconsideration was approved from 12/15/22 - 12/21/22. Swing bed RN to send updated clinical on 12/21/22. SS to follow.                XANDER Wills

## 2022-12-20 NOTE — PROGRESS NOTES
Pt admitted to swing bed on 10/5 for additional therapy in the setting of generalized weakness/debility. Pt not seen or examined today. I have reviewed her VS. I have discussed her care with Ed, RN who does not report any acute events or issues that need to be addressed. He does report some mild dyspnea and hypoxia at HS with increased fatigue reported. BP stable today. Cont current antihypertensive regimen. Cont Eliquis for treatment of DVT. Cont PT/OT. Letter of reconsideration approved through 12/21 with swing bed coordinator to send updated clinicals tomorrow. Cont supportive treatment.       Mustapha Loredo,   12/20/22  15:52 EDT

## 2022-12-20 NOTE — THERAPY TREATMENT NOTE
Acute Care - Occupational Therapy Treatment Note   Scot     Patient Name: Tre Kinney  : 1932  MRN: [id number]  Today's Date: 2022  Onset of Illness/Injury or Date of Surgery: 10/06/22     Referring Physician: Janneth    Admit Date: 10/5/2022     No diagnosis found.  Patient Active Problem List   Diagnosis   • Acid reflux   • Ankle arthralgia   • Cardiac conduction disorder   • Closed fracture of distal fibula   • Arteriosclerosis of coronary artery   • Fracture of distal end of tibia   • Elevated cholesterol   • BP (high blood pressure)   • Hypertrophic polyarthritis   • OP (osteoporosis)   • Right heart failure (HCC)   • Heart failure, chronic, right-sided (HCC)   • SOB (shortness of breath)   • Palpitations   • CHF (congestive heart failure) (HCC)   • Congestive heart failure (HCC)   • Precordial pain   • Chronic anticoagulation   • Hypokalemia   • Anemia, unspecified type   • Generalized weakness     Past Medical History:   Diagnosis Date   • Anemia    • Arthritis    • CHF (congestive heart failure) ([facility])    • Chronic kidney disease    • Coronary artery disease    • H/O blood clots    • History of transfusion     no reaction    • Hypertension      Past Surgical History:   Procedure Laterality Date   • ABDOMINAL SURGERY     • APPENDECTOMY     • CARDIAC SURGERY     •  SECTION     • COLONOSCOPY N/A 3/7/2018    Procedure: COLONOSCOPY;  Surgeon: Willie Gresham MD;  Location:  [facility];  Service:    • CORONARY ANGIOPLASTY WITH STENT PLACEMENT     • ENDOSCOPY N/A 3/6/2018    Procedure: ESOPHAGOGASTRODUODENOSCOPY;  Surgeon: Willie Gresham MD;  Location:  [facility];  Service:    • PACEMAKER IMPLANTATION      left side    • SKIN BIOPSY     • TUBAL ABDOMINAL LIGATION           OT ASSESSMENT FLOWSHEET (last 12 hours)     OT Evaluation and Treatment     Row Name 22 1515                   OT Time and Intention    Subjective Information fatigue  -KP        Document Type therapy  note (daily note)  -KP        Mode of Treatment individual therapy;occupational therapy  -KP        Total Minutes, Occupational Therapy 54  -KP        Patient Effort good  -KP        Symptoms Noted During/After Treatment none  -KP        Comment Patient seen for OT treatment session. PT present due to 2 person assist during functional mobility for safety. Patient's son on Facetime throughout session.  -KP           General Information    Patient Profile Reviewed yes  -KP        Existing Precautions/Restrictions fall  -KP        Limitations/Impairments safety/cognitive  -KP           Pain Assessment    Pretreatment Pain Rating 0/10 - no pain  -KP        Posttreatment Pain Rating 0/10 - no pain  -KP           Cognition    Affect/Mental Status (Cognition) WFL  -KP        Orientation Status (Cognition) oriented x 4  -KP        Follows Commands (Cognition) WFL  -KP        Personal Safety Interventions fall prevention program maintained;gait belt  -KP           Functional Mobility    Functional Mobility- Comment Completed X3 trials with rolling walker and minimal assist and second person to follow with chair for safety. Completed X8 feet, X12 feet, X16 feet.  -KP           Sit-Stand Transfer    Sit-Stand [place] (Transfers) minimum assist (75% patient effort);moderate assist (50% patient effort);1 person assist  -        Assistive Device (Sit-Stand Transfers) walker, front-wheeled  -        Comment, (Sit-Stand Transfer) X3 trials  -KP           Stand-Sit Transfer    Stand-Sit [place] (Transfers) minimum assist (75% patient effort);1 person assist  -        Assistive Device (Stand-Sit Transfers) walker, front-wheeled  -        Comment, (Stand-Sit Transfer) X3 trials  -KP           Motor Skills    Functional Endurance fair  -KP        Therapeutic Exercise other (see comments)  BUE exercises with red theraband through functional movement patterns X20 reps X2 sets; AROM for shoulder flexion  -KP               User Key  (r) = Recorded By, (t) = Taken By, (c) = Cosigned By    Initials Name Effective Dates    Linn Richardson, OT 06/16/21 -                        OT Recommendation and Plan     Progress Toward Functional Goals (OT): progress toward functional goals is gradual  Plan of Care Review  Plan of Care Reviewed With: patient  Progress: improving  Outcome Evaluation: Patient seen for OT treatment session 8:55 to 9:44. Initial session addressed functional transfers/mobility with PT present. Then OT addressed UE strength, endurance, and ROM through seated exercises. She then was seen for ADL retraining as documented for toileting. Continue plan of care.  Plan of Care Reviewed With: patient  Outcome Evaluation: Patient seen for OT treatment session 8:55 to 9:44. Initial session addressed functional transfers/mobility with PT present. Then OT addressed UE strength, endurance, and ROM through seated exercises. She then was seen for ADL retraining as documented for toileting. Continue plan of care.     Outcome Measures     Row Name 12/19/22 1500 12/19/22 1401          How much help from another person do you currently need...    Turning from your back to your side while in flat bed without using bedrails? 3  -LL --     Moving from lying on back to sitting on the side of a flat bed without bedrails? 3  -LL --     Moving to and from a bed to a chair (including a wheelchair)? 2  -LL --     Standing up from a chair using your arms (e.g., wheelchair, bedside chair)? 2  -LL --     Climbing 3-5 steps with a railing? 2  -LL --     To walk in hospital room? 2  -LL --     AM-PAC 6 Clicks Score (PT) 14  -LL --        How much help from another is currently needed...    Putting on and taking off regular lower body clothing? -- 2  -KP     Bathing (including washing, rinsing, and drying) -- 3  -KP     Toileting (which includes using toilet bed pan or urinal) -- 3  -KP     Putting on and taking off regular upper body clothing -- 3  -KP      Taking care of personal grooming (such as brushing teeth) -- 3  -     Eating meals -- 4  -     AM-PAC 6 Clicks Score (OT) -- 18  -        Functional Assessment    Outcome Measure Options AM-PAC 6 Clicks Basic Mobility (PT)  - --           User Key  (r) = Recorded By, (t) = Taken By, (c) = Cosigned By    Initials Name Provider Type     Neda Garica PTA Physical Therapist Assistant    Linn Richardson OT Occupational Therapist                Time Calculation:    Time Calculation- OT     Row Name 12/20/22 1520             Time Calculation- OT    OT Start Time 1330  -      OT Stop Time 1424  -      OT Time Calculation (min) 54 min  -      Total Timed Code Minutes- OT 54 minute(s)  -      OT Received On 12/20/22  -            User Key  (r) = Recorded By, (t) = Taken By, (c) = Cosigned By    Initials Name Provider Type    Linn Richardson OT Occupational Therapist              Therapy Charges for Today     Code Description Service Date Service Provider Modifiers Qty    12457539217  OT THER PROC EA 15 MIN 12/19/2022 Linn Quevedo OT GO 1    89121237180 HC OT THER PROC EA 15 MIN 12/20/2022 Linn Quevedo OT GO 2               Linn Quevedo OT  12/20/2022

## 2022-12-20 NOTE — THERAPY TREATMENT NOTE
Acute Care - Physical Therapy Treatment Note   [place]     Patient Name: Tre Kinney  : 1932  MRN: [id number]  Today's Date: 2022   Onset of Illness/Injury or Date of Surgery: 10/06/22  Visit Dx:   No diagnosis found.  Patient Active Problem List   Diagnosis   • Acid reflux   • Ankle arthralgia   • Cardiac conduction disorder   • Closed fracture of distal fibula   • Arteriosclerosis of coronary artery   • Fracture of distal end of tibia   • Elevated cholesterol   • BP (high blood pressure)   • Hypertrophic polyarthritis   • OP (osteoporosis)   • Right heart failure (HCC)   • Heart failure, chronic, right-sided (HCC)   • SOB (shortness of breath)   • Palpitations   • CHF (congestive heart failure) (HCC)   • Congestive heart failure (HCC)   • Precordial pain   • Chronic anticoagulation   • Hypokalemia   • Anemia, unspecified type   • Generalized weakness     Past Medical History:   Diagnosis Date   • Anemia    • Arthritis    • CHF (congestive heart failure) (HCC)    • Chronic kidney disease    • Coronary artery disease    • H/O blood clots    • History of transfusion     no reaction    • Hypertension      Past Surgical History:   Procedure Laterality Date   • ABDOMINAL SURGERY     • APPENDECTOMY     • CARDIAC SURGERY     •  SECTION     • COLONOSCOPY N/A 3/7/2018    Procedure: COLONOSCOPY;  Surgeon: Willie Gresham MD;  Location:  [facility];  Service:    • CORONARY ANGIOPLASTY WITH STENT PLACEMENT     • ENDOSCOPY N/A 3/6/2018    Procedure: ESOPHAGOGASTRODUODENOSCOPY;  Surgeon: Willie Gresham MD;  Location:  [facility];  Service:    • PACEMAKER IMPLANTATION      left side    • SKIN BIOPSY     • TUBAL ABDOMINAL LIGATION       PT Assessment (last 12 hours)     PT Evaluation and Treatment     Row Name 22 1330          Physical Therapy Time and Intention    Subjective Information complains of;weakness  -LL     Document Type therapy note (daily note)  -LL     Mode of Treatment  physical therapy;individual therapy  -LL     Patient Effort good  -LL     Comment Patient seen for PT this date 13:30=14:24 with OT present to assist with functional mobility for safety.  Patient participated in LE strengthening ex's, transfers and gait training this date.  -LL     Row Name 12/20/22 1330          General Information    Patient Profile Reviewed yes  -LL     Row Name 12/20/22 1330          Cognition    Affect/Mental Status (Cognition) WFL  -LL     Orientation Status (Cognition) oriented x 4  -LL     Follows Commands (Cognition) WFL  -LL     Personal Safety Interventions fall prevention program maintained;gait belt;nonskid shoes/slippers when out of bed;supervised activity  -LL     Row Name 12/20/22 1330          Bed Mobility    Comment, (Bed Mobility) Patient UIC  -LL     Row Name 12/20/22 1330          Transfers    Transfers sit-stand transfer;stand-sit transfer  -LL     Row Name 12/20/22 1330          Sit-Stand Transfer    Sit-Stand [place] (Transfers) minimum assist (75% patient effort);verbal cues;moderate assist (50% patient effort)  For proper hand placement  -LL     Assistive Device (Sit-Stand Transfers) walker, front-wheeled  -LL     Comment, (Sit-Stand Transfer) x 3 trials  -LL     Row Name 12/20/22 1330          Stand-Sit Transfer    Stand-Sit [place] (Transfers) moderate assist (50% patient effort);minimum assist (75% patient effort)  with verbal cueing to reach back for chair  -LL     Assistive Device (Stand-Sit Transfers) walker, front-wheeled  -LL     Comment, (Stand-Sit Transfer) x 3 trials  -LL     Row Name 12/20/22 1330          Gait/Stairs (Locomotion)    [place] Level (Gait) moderate assist (50% patient effort);verbal cues;nonverbal cues (demo/gesture);minimum assist (75% patient effort);1 person assist;1 person to manage equipment  -LL     Assistive Device (Gait) walker, front-wheeled  -LL     Distance in Feet (Gait) 10', 20', 27'  -LL     Pattern (Gait) step-to  -LL      Deviations/Abnormal Patterns (Gait) stride length decreased;gait speed decreased;festinating/shuffling;weight shifting decreased  -LL     Bilateral Gait Deviations weight shift ability decreased;forward flexed posture  -LL     Row Name 12/20/22 1330          Hip (Therapeutic Exercise)    Hip Strengthening (Therapeutic Exercise) bilateral;flexion;extension;aBduction;aDduction;marching while seated;sitting;resistance band;red  -LL     Row Name 12/20/22 1330          Knee (Therapeutic Exercise)    Knee Strengthening (Therapeutic Exercise) bilateral;flexion;extension;marching while seated;LAQ (long arc quad);hamstring curls;sitting;resistance band;red  -LL     Row Name 12/20/22 1330          Ankle (Therapeutic Exercise)    Ankle Strengthening (Therapeutic Exercise) bilateral;dorsiflexion;plantarflexion;sitting  -LL     Row Name 12/20/22 1330          Coping    Observed Emotional State calm;cooperative;pleasant  -LL     Verbalized Emotional State acceptance  -LL     Row Name 12/20/22 1330          Positioning and Restraints    Pre-Treatment Position sitting in chair/recliner  -LL     Post Treatment Position chair  -LL     In Chair reclined;call light within reach;encouraged to call for assist  With tray table in front of her  -LL           User Key  (r) = Recorded By, (t) = Taken By, (c) = Cosigned By    Initials Name Provider Type    Neda Kyle PTA Physical Therapist Assistant                Physical Therapy Education     Title: PT OT SLP Therapies (Done)     Topic: Physical Therapy (Done)     Point: Mobility training (Done)     Learning Progress Summary           Patient Acceptance, E, VU by CL at 12/17/2022 1703    Acceptance, E,D, VU,NR by LL at 12/16/2022 1605    Acceptance, E,TB, VU by AC at 12/16/2022 1523    Acceptance, E,TB, VU by AC at 12/15/2022 1618    Acceptance, E,TB, VU by AC at 12/14/2022 1503    Acceptance, E,TB, VU by DL at 12/13/2022 0240    Acceptance, D,E, VU,NR by LL at 12/12/2022 1455     Acceptance, E,TB, VU by RW at 12/9/2022 1451    Acceptance, E,TB, VU by BC at 11/27/2022 1319    Acceptance, E,TB, VU by RM at 11/20/2022 1449    Acceptance, E,TB, VU by RM at 11/18/2022 1021    Acceptance, E,TB, VU by RH at 11/16/2022 1110    Acceptance, E,TB, VU by KK at 11/14/2022 1638    Acceptance, E,TB, VU by KK at 11/13/2022 1648    Acceptance, E,TB, VU by KG at 11/10/2022 1827    Acceptance, E,TB, VU by DM at 11/9/2022 1638    Acceptance, E,TB, VU by KK at 11/8/2022 1754    Acceptance, E,TB, VU by KK at 11/7/2022 1635    Acceptance, E,D, VU,NR by LL at 11/5/2022 1325    Acceptance, E, VU by EB at 11/4/2022 1448    Acceptance, E,TB, VU by KM at 11/4/2022 1101   Family Acceptance, D,E, VU,NR by LL at 12/12/2022 1455                   Point: Home exercise program (Done)     Learning Progress Summary           Patient Acceptance, E, VU by CL at 12/17/2022 1703    Acceptance, E,D, VU,NR by LL at 12/16/2022 1605    Acceptance, E,TB, VU by AC at 12/16/2022 1523    Acceptance, E,TB, VU by AC at 12/15/2022 1618    Acceptance, E,TB, VU by AC at 12/14/2022 1503    Acceptance, E,TB, VU by DL at 12/13/2022 0240    Acceptance, D,E, VU,NR by LL at 12/12/2022 1455    Acceptance, E,TB, VU by RW at 12/9/2022 1451    Acceptance, E,TB, VU by BC at 11/27/2022 1319    Acceptance, E,TB, VU by RM at 11/20/2022 1449    Acceptance, E,TB, VU by RM at 11/18/2022 1021    Acceptance, E,TB, VU by RH at 11/16/2022 1110    Acceptance, E,TB, VU by KK at 11/14/2022 1638    Acceptance, E,TB, VU by KK at 11/13/2022 1648    Acceptance, E,TB, VU by KG at 11/10/2022 1827    Acceptance, E,TB, VU by DM at 11/9/2022 1638    Acceptance, E,TB, VU by KK at 11/8/2022 1754    Acceptance, E,TB, VU by KK at 11/7/2022 1635    Acceptance, E,D, VU,NR by LL at 11/5/2022 1325    Acceptance, E, VU by EB at 11/4/2022 1448    Acceptance, E,TB, VU by KM at 11/4/2022 1101   Family Acceptance, D,E, VU,NR by LL at 12/12/2022 1455                   Point: Body  mechanics (Done)     Learning Progress Summary           Patient Acceptance, E, VU by CL at 12/17/2022 1703    Acceptance, E,D, VU,NR by LL at 12/16/2022 1605    Acceptance, E,TB, VU by AC at 12/16/2022 1523    Acceptance, E,TB, VU by AC at 12/15/2022 1618    Acceptance, E,TB, VU by AC at 12/14/2022 1503    Acceptance, E,TB, VU by DL at 12/13/2022 0240    Acceptance, D,E, VU,NR by LL at 12/12/2022 1455    Acceptance, E,TB, VU by RW at 12/9/2022 1451    Acceptance, E,TB, VU by BC at 11/27/2022 1319    Acceptance, E,TB, VU by RM at 11/20/2022 1449    Acceptance, E,TB, VU by RM at 11/18/2022 1021    Acceptance, E,TB, VU by RH at 11/16/2022 1110    Acceptance, E,TB, VU by KK at 11/14/2022 1638    Acceptance, E,TB, VU by KK at 11/13/2022 1648    Acceptance, E,TB, VU by KG at 11/10/2022 1827    Acceptance, E,TB, VU by DM at 11/9/2022 1638    Acceptance, E,TB, VU by KK at 11/8/2022 1754    Acceptance, E,TB, VU by KK at 11/7/2022 1635    Acceptance, E,D, VU,NR by LL at 11/5/2022 1325    Acceptance, E, VU by EB at 11/4/2022 1448    Acceptance, E,TB, VU by KM at 11/4/2022 1101   Family Acceptance, D,E, VU,NR by LL at 12/12/2022 1455                   Point: Precautions (Done)     Learning Progress Summary           Patient Acceptance, E, VU by CL at 12/17/2022 1703    Acceptance, E,D, VU,NR by LL at 12/16/2022 1605    Acceptance, E,TB, VU by AC at 12/16/2022 1523    Acceptance, E,TB, VU by AC at 12/15/2022 1618    Acceptance, E,TB, VU by AC at 12/14/2022 1503    Acceptance, E,TB, VU by DL at 12/13/2022 0240    Acceptance, D,E, VU,NR by LL at 12/12/2022 1455    Acceptance, E,TB, VU by RW at 12/9/2022 1451    Acceptance, E,TB, VU by BC at 11/27/2022 1319    Acceptance, E,TB, VU by RM at 11/20/2022 1449    Acceptance, E,TB, VU by RM at 11/18/2022 1021    Acceptance, E,TB, VU by RH at 11/16/2022 1110    Acceptance, E,TB, VU by KK at 11/14/2022 1638    Acceptance, E,TB, VU by KK at 11/13/2022 1648    Acceptance, E,TB, VU by KG at  11/10/2022 1827    Acceptance, E,TB, VU by DM at 11/9/2022 1638    Acceptance, E,TB, VU by KK at 11/8/2022 1754    Acceptance, E,TB, VU by KK at 11/7/2022 1635    Acceptance, E,D, VU,NR by LL at 11/5/2022 1325    Acceptance, E, VU by EB at 11/4/2022 1448    Acceptance, E,TB, VU by KM at 11/4/2022 1101   Family Acceptance, D,E, VU,NR by LL at 12/12/2022 1455                               User Key     Initials Effective Dates Name Provider Type Discipline    CL 06/16/21 -  Mitzy Mustafa, RN Registered Nurse Nurse    LL 05/02/16 -  Neda Garcia PTA Physical Therapist Assistant PT    RM 06/14/22 -  Bethany Krause, RN Registered Nurse Nurse    DM 06/16/21 -  Moniz, Deborah, RN Registered Nurse Nurse    BC 08/02/22 -  Collett, Brittany, RN Registered Nurse Nurse    AC 10/13/22 -  Jaya Castillo RN Registered Nurse Nurse    KM 05/24/22 -  Mao Vázquez, PT Physical Therapist PT    EB 09/22/22 -  Cedric Oliver, RN Registered Nurse Nurse    KK 06/27/22 -  Chelsi Bolivar, RN Registered Nurse Nurse    DL 03/16/22 -  Francy Garcia, RN Registered Nurse Nurse    KG 09/22/22 -  Verónica Rodriguez, RN Registered Nurse Nurse    RW 09/22/22 -  Bethany Schofield, RN Registered Nurse Nurse    RH 08/03/22 -  Jung Russell RN Extern Registered Nurse Nurse              PT Recommendation and Plan         Outcome Measures     Row Name 12/20/22 1519 12/19/22 1500 12/19/22 1401       How much help from another person do you currently need...    Turning from your back to your side while in flat bed without using bedrails? 3  -LL 3  -LL --    Moving from lying on back to sitting on the side of a flat bed without bedrails? 3  -LL 3  -LL --    Moving to and from a bed to a chair (including a wheelchair)? 3  -LL 2  -LL --    Standing up from a chair using your arms (e.g., wheelchair, bedside chair)? 2  -LL 2  -LL --    Climbing 3-5 steps with a railing? 2  -LL 2  -LL --    To walk in hospital room? 2  -LL 2  -LL --    AM-PAC 6 Clicks  Score (PT) 15  -LL 14  -LL --       How much help from another is currently needed...    Putting on and taking off regular lower body clothing? -- -- 2  -KP    Bathing (including washing, rinsing, and drying) -- -- 3  -KP    Toileting (which includes using toilet bed pan or urinal) -- -- 3  -KP    Putting on and taking off regular upper body clothing -- -- 3  -KP    Taking care of personal grooming (such as brushing teeth) -- -- 3  -KP    Eating meals -- -- 4  -KP    AM-PAC 6 Clicks Score (OT) -- -- 18  -KP       Functional Assessment    Outcome Measure Options -- AM-PAC 6 Clicks Basic Mobility (PT)  -LL --          User Key  (r) = Recorded By, (t) = Taken By, (c) = Cosigned By    Initials Name Provider Type    LL Neda Garcia PTA Physical Therapist Assistant     Linn Quevedo, OT Occupational Therapist                 Time Calculation:    PT Charges     Row Name 12/20/22 1526             Time Calculation    Start Time 1330  -LL      Stop Time 1424  -LL      Time Calculation (min) 54 min  -LL      PT Received On 12/20/22  -LL         Time Calculation- PT    Total Timed Code Minutes- PT 54 minute(s)  -LL            User Key  (r) = Recorded By, (t) = Taken By, (c) = Cosigned By    Initials Name Provider Type    LL Neda Garcia PTA Physical Therapist Assistant              Therapy Charges for Today     Code Description Service Date Service Provider Modifiers Qty    56043693632 HC GAIT TRAINING EA 15 MIN 12/19/2022 Neda Garcia PTA GP, CQ 1    32314923172 HC PT THER PROC EA 15 MIN 12/19/2022 Neda Garcia PTA GP, CQ 1    84872167753 HC GAIT TRAINING EA 15 MIN 12/20/2022 Neda Garcia PTA GP, CQ 1    14134648708 HC PT THER PROC EA 15 MIN 12/20/2022 Neda Garcia PTA GP, CQ 1          PT G-Codes  Outcome Measure Options: AM-PAC 6 Clicks Daily Activity (OT)  AM-PAC 6 Clicks Score (PT): 15  AM-PAC 6 Clicks Score (OT): 18    Hannah Garcia PTA  12/20/2022

## 2022-12-20 NOTE — PLAN OF CARE
Goal Outcome Evaluation:     The patient is currently resting in bed. The patient did get somewhat short of breath overnight, she attempted to get up to the bedside commode and became short of air, and her legs were too weak. She was placed onto bedpan. However, afterwards she still complained of some shortness of air, oxygen was 88%. Therefore, she was placed on 1 liter of oxygen. Her oxygen improved to 95%. MIKAL Vega made aware, stated to attempt to place back on room air this morning. The patients lung sounds were diminished in lower lobes, but no change from earlier in the shift. Will continue with plan of care

## 2022-12-20 NOTE — PLAN OF CARE
Goal Outcome Evaluation:  Pt resting in bed. Pt sat up in chair most of shift. Pt remains on room air and is tolerating it well. No acute changes this shift. VSS. Will continue plan of care.

## 2022-12-21 PROCEDURE — 97110 THERAPEUTIC EXERCISES: CPT

## 2022-12-21 RX ADMIN — PANTOPRAZOLE SODIUM 40 MG: 40 TABLET, DELAYED RELEASE ORAL at 05:22

## 2022-12-21 RX ADMIN — TAMSULOSIN HYDROCHLORIDE 0.4 MG: 0.4 CAPSULE ORAL at 08:24

## 2022-12-21 RX ADMIN — FUROSEMIDE 40 MG: 40 TABLET ORAL at 17:47

## 2022-12-21 RX ADMIN — MAGNESIUM GLUCONATE 500 MG ORAL TABLET 400 MG: 500 TABLET ORAL at 08:25

## 2022-12-21 RX ADMIN — ACETAMINOPHEN 650 MG: 325 TABLET, FILM COATED ORAL at 13:24

## 2022-12-21 RX ADMIN — DOCUSATE SODIUM 100 MG: 100 CAPSULE ORAL at 20:57

## 2022-12-21 RX ADMIN — DICLOFENAC 4 G: 10 GEL TOPICAL at 20:59

## 2022-12-21 RX ADMIN — DICLOFENAC 4 G: 10 GEL TOPICAL at 11:32

## 2022-12-21 RX ADMIN — DICLOFENAC 4 G: 10 GEL TOPICAL at 08:26

## 2022-12-21 RX ADMIN — METOPROLOL TARTRATE 25 MG: 25 TABLET, FILM COATED ORAL at 08:22

## 2022-12-21 RX ADMIN — Medication 1 TABLET: at 08:24

## 2022-12-21 RX ADMIN — APIXABAN 5 MG: 5 TABLET, FILM COATED ORAL at 20:57

## 2022-12-21 RX ADMIN — DOCUSATE SODIUM 100 MG: 100 CAPSULE ORAL at 08:22

## 2022-12-21 RX ADMIN — Medication 10 MG: at 20:57

## 2022-12-21 RX ADMIN — HYDRALAZINE HYDROCHLORIDE 5 MG: 25 TABLET, FILM COATED ORAL at 05:22

## 2022-12-21 RX ADMIN — DICLOFENAC 4 G: 10 GEL TOPICAL at 17:47

## 2022-12-21 RX ADMIN — METOPROLOL TARTRATE 25 MG: 25 TABLET, FILM COATED ORAL at 20:56

## 2022-12-21 RX ADMIN — GABAPENTIN 300 MG: 300 CAPSULE ORAL at 20:56

## 2022-12-21 RX ADMIN — HYDRALAZINE HYDROCHLORIDE 5 MG: 25 TABLET, FILM COATED ORAL at 13:29

## 2022-12-21 RX ADMIN — FUROSEMIDE 40 MG: 40 TABLET ORAL at 08:24

## 2022-12-21 RX ADMIN — HYDRALAZINE HYDROCHLORIDE 5 MG: 25 TABLET, FILM COATED ORAL at 21:18

## 2022-12-21 RX ADMIN — ESCITALOPRAM 10 MG: 10 TABLET, FILM COATED ORAL at 08:24

## 2022-12-21 RX ADMIN — APIXABAN 5 MG: 5 TABLET, FILM COATED ORAL at 08:25

## 2022-12-21 NOTE — THERAPY TREATMENT NOTE
Acute Care - Physical Therapy Treatment Note   [place]     Patient Name: Tre Kinney  : 1932  MRN: [id number]  Today's Date: 2022   Onset of Illness/Injury or Date of Surgery: 10/06/22  Visit Dx:   No diagnosis found.  Patient Active Problem List   Diagnosis   • Acid reflux   • Ankle arthralgia   • Cardiac conduction disorder   • Closed fracture of distal fibula   • Arteriosclerosis of coronary artery   • Fracture of distal end of tibia   • Elevated cholesterol   • BP (high blood pressure)   • Hypertrophic polyarthritis   • OP (osteoporosis)   • Right heart failure (HCC)   • Heart failure, chronic, right-sided (HCC)   • SOB (shortness of breath)   • Palpitations   • CHF (congestive heart failure) (HCC)   • Congestive heart failure (HCC)   • Precordial pain   • Chronic anticoagulation   • Hypokalemia   • Anemia, unspecified type   • Generalized weakness     Past Medical History:   Diagnosis Date   • Anemia    • Arthritis    • CHF (congestive heart failure) (HCC)    • Chronic kidney disease    • Coronary artery disease    • H/O blood clots    • History of transfusion     no reaction    • Hypertension      Past Surgical History:   Procedure Laterality Date   • ABDOMINAL SURGERY     • APPENDECTOMY     • CARDIAC SURGERY     •  SECTION     • COLONOSCOPY N/A 3/7/2018    Procedure: COLONOSCOPY;  Surgeon: Willie Gresham MD;  Location:  [facility];  Service:    • CORONARY ANGIOPLASTY WITH STENT PLACEMENT     • ENDOSCOPY N/A 3/6/2018    Procedure: ESOPHAGOGASTRODUODENOSCOPY;  Surgeon: Willie Gresham MD;  Location:  [facility];  Service:    • PACEMAKER IMPLANTATION      left side    • SKIN BIOPSY     • TUBAL ABDOMINAL LIGATION       PT Assessment (last 12 hours)     PT Evaluation and Treatment     Row Name 22 1245          Physical Therapy Time and Intention    Subjective Information complains of;weakness;fatigue  -LL     Document Type therapy note (daily note)  -LL     Mode of  Treatment physical therapy;individual therapy  -LL     Patient Effort adequate  -LL     Comment Patient seen for PT this date 12:45-13:18 with OT assist for functional mobility for safety.  Son, Willian, on facetime throughout entire session.  -LL     Row Name 12/21/22 1245          General Information    Patient Profile Reviewed yes  -LL     Row Name 12/21/22 1245          Cognition    Affect/Mental Status (Cognition) WFL  -LL     Orientation Status (Cognition) oriented x 4  -LL     Follows Commands (Cognition) WFL  -LL     Personal Safety Interventions fall prevention program maintained;gait belt;nonskid shoes/slippers when out of bed;supervised activity  -     Row Name 12/21/22 1245          Bed Mobility    Sit-Supine [place] (Bed Mobility) contact guard;verbal cues;nonverbal cues (demo/gesture)  -LL     Assistive Device (Bed Mobility) bed rails;draw sheet  -     Row Name 12/21/22 1245          Transfers    Transfers sit-stand transfer;stand-sit transfer  -     Row Name 12/21/22 1245          Sit-Stand Transfer    Sit-Stand [place] (Transfers) minimum assist (75% patient effort);verbal cues;moderate assist (50% patient effort)  For proper hand placement  -LL     Assistive Device (Sit-Stand Transfers) walker, front-wheeled  -LL     Comment, (Sit-Stand Transfer) x 2 trials  -LL     Row Name 12/21/22 1245          Stand-Sit Transfer    Stand-Sit [place] (Transfers) moderate assist (50% patient effort);minimum assist (75% patient effort)  with verbal cueing to reach back for chair  -LL     Assistive Device (Stand-Sit Transfers) walker, front-wheeled  -LL     Comment, (Stand-Sit Transfer) x 2 trials  -LL     Row Name 12/21/22 1245          Gait/Stairs (Locomotion)    [place] Level (Gait) moderate assist (50% patient effort);verbal cues;nonverbal cues (demo/gesture);minimum assist (75% patient effort);1 person assist;1 person to manage equipment  -LL     Assistive Device (Gait) walker,  front-wheeled  -LL     Distance in Feet (Gait) 8', 6'  -LL     Pattern (Gait) step-to  -LL     Deviations/Abnormal Patterns (Gait) stride length decreased;gait speed decreased;festinating/shuffling;weight shifting decreased  -LL     Bilateral Gait Deviations weight shift ability decreased;forward flexed posture  -LL     Row Name 12/21/22 1245          Hip (Therapeutic Exercise)    Hip Strengthening (Therapeutic Exercise) bilateral;flexion;extension;aBduction;aDduction;marching while seated;sitting;resistance band;red  -LL     Row Name 12/21/22 1245          Knee (Therapeutic Exercise)    Knee Strengthening (Therapeutic Exercise) bilateral;flexion;extension;marching while seated;LAQ (long arc quad);hamstring curls;sitting;resistance band;red  -LL     Row Name 12/21/22 1245          Ankle (Therapeutic Exercise)    Ankle Strengthening (Therapeutic Exercise) bilateral;dorsiflexion;plantarflexion;sitting  -LL     Row Name 12/21/22 1245          Coping    Observed Emotional State calm;cooperative;pleasant  -LL     Verbalized Emotional State acceptance  -LL     Row Name 12/21/22 1245          Positioning and Restraints    Pre-Treatment Position sitting in chair/recliner  -LL     Post Treatment Position bed  -LL     In Bed supine;call light within reach;encouraged to call for assist;side rails up x2;heels elevated  -LL           User Key  (r) = Recorded By, (t) = Taken By, (c) = Cosigned By    Initials Name Provider Type    Neda Kyle PTA Physical Therapist Assistant                Physical Therapy Education     Title: PT OT SLP Therapies (Done)     Topic: Physical Therapy (Done)     Point: Mobility training (Done)     Learning Progress Summary           Patient Acceptance, E, VU by CL at 12/21/2022 1303    Acceptance, E, VU by EB at 12/20/2022 1644    Acceptance, E, VU by CL at 12/17/2022 1703    Acceptance, E,D, VU,NR by LL at 12/16/2022 1605    Acceptance, E,TB, VU by AC at 12/16/2022 1523    Acceptance, E,TB,  VU by AC at 12/15/2022 1618    Acceptance, E,TB, VU by AC at 12/14/2022 1503    Acceptance, E,TB, VU by DL at 12/13/2022 0240    Acceptance, D,E, VU,NR by LL at 12/12/2022 1455    Acceptance, E,TB, VU by RW at 12/9/2022 1451    Acceptance, E,TB, VU by BC at 11/27/2022 1319    Acceptance, E,TB, VU by RM at 11/20/2022 1449    Acceptance, E,TB, VU by RM at 11/18/2022 1021    Acceptance, E,TB, VU by RH at 11/16/2022 1110    Acceptance, E,TB, VU by KK at 11/14/2022 1638    Acceptance, E,TB, VU by KK at 11/13/2022 1648    Acceptance, E,TB, VU by KG at 11/10/2022 1827    Acceptance, E,TB, VU by DM at 11/9/2022 1638    Acceptance, E,TB, VU by KK at 11/8/2022 1754    Acceptance, E,TB, VU by KK at 11/7/2022 1635    Acceptance, E,D, VU,NR by LL at 11/5/2022 1325    Acceptance, E, VU by EB at 11/4/2022 1448    Acceptance, E,TB, VU by KM at 11/4/2022 1101   Family Acceptance, D,E, VU,NR by LL at 12/12/2022 1455                   Point: Home exercise program (Done)     Learning Progress Summary           Patient Acceptance, E, VU by CL at 12/21/2022 1303    Acceptance, E, VU by EB at 12/20/2022 1644    Acceptance, E, VU by CL at 12/17/2022 1703    Acceptance, E,D, VU,NR by LL at 12/16/2022 1605    Acceptance, E,TB, VU by AC at 12/16/2022 1523    Acceptance, E,TB, VU by AC at 12/15/2022 1618    Acceptance, E,TB, VU by AC at 12/14/2022 1503    Acceptance, E,TB, VU by DL at 12/13/2022 0240    Acceptance, D,E, VU,NR by LL at 12/12/2022 1455    Acceptance, E,TB, VU by RW at 12/9/2022 1451    Acceptance, E,TB, VU by BC at 11/27/2022 1319    Acceptance, E,TB, VU by RM at 11/20/2022 1449    Acceptance, E,TB, VU by RM at 11/18/2022 1021    Acceptance, E,TB, VU by RH at 11/16/2022 1110    Acceptance, E,TB, VU by KK at 11/14/2022 1638    Acceptance, E,TB, VU by KK at 11/13/2022 1648    Acceptance, E,TB, VU by KG at 11/10/2022 1827    Acceptance, E,TB, VU by DM at 11/9/2022 1638    Acceptance, E,TB, VU by KK at 11/8/2022 1754    Acceptance,  E,TB, VU by KK at 11/7/2022 1635    Acceptance, E,D, VU,NR by LL at 11/5/2022 1325    Acceptance, E, VU by EB at 11/4/2022 1448    Acceptance, E,TB, VU by KM at 11/4/2022 1101   Family Acceptance, D,E, VU,NR by LL at 12/12/2022 1455                   Point: Body mechanics (Done)     Learning Progress Summary           Patient Acceptance, E, VU by CL at 12/21/2022 1303    Acceptance, E, VU by EB at 12/20/2022 1644    Acceptance, E, VU by CL at 12/17/2022 1703    Acceptance, E,D, VU,NR by LL at 12/16/2022 1605    Acceptance, E,TB, VU by AC at 12/16/2022 1523    Acceptance, E,TB, VU by AC at 12/15/2022 1618    Acceptance, E,TB, VU by AC at 12/14/2022 1503    Acceptance, E,TB, VU by DL at 12/13/2022 0240    Acceptance, D,E, VU,NR by LL at 12/12/2022 1455    Acceptance, E,TB, VU by RW at 12/9/2022 1451    Acceptance, E,TB, VU by BC at 11/27/2022 1319    Acceptance, E,TB, VU by RM at 11/20/2022 1449    Acceptance, E,TB, VU by RM at 11/18/2022 1021    Acceptance, E,TB, VU by RH at 11/16/2022 1110    Acceptance, E,TB, VU by KK at 11/14/2022 1638    Acceptance, E,TB, VU by KK at 11/13/2022 1648    Acceptance, E,TB, VU by KG at 11/10/2022 1827    Acceptance, E,TB, VU by DM at 11/9/2022 1638    Acceptance, E,TB, VU by KK at 11/8/2022 1754    Acceptance, E,TB, VU by KK at 11/7/2022 1635    Acceptance, E,D, VU,NR by LL at 11/5/2022 1325    Acceptance, E, VU by EB at 11/4/2022 1448    Acceptance, E,TB, VU by KM at 11/4/2022 1101   Family Acceptance, D,E, VU,NR by LL at 12/12/2022 1455                   Point: Precautions (Done)     Learning Progress Summary           Patient Acceptance, E, VU by CL at 12/21/2022 1303    Acceptance, E, VU by EB at 12/20/2022 1644    Acceptance, E, VU by CL at 12/17/2022 1703    Acceptance, E,D, VU,NR by LL at 12/16/2022 1605    Acceptance, E,TB, VU by AC at 12/16/2022 1523    Acceptance, E,TB, VU by AC at 12/15/2022 1618    Acceptance, E,TB, VU by AC at 12/14/2022 1503    Acceptance, E,TB, VU by  DL at 12/13/2022 0240    Acceptance, D,E, VU,NR by LL at 12/12/2022 1455    Acceptance, E,TB, VU by RW at 12/9/2022 1451    Acceptance, E,TB, VU by BC at 11/27/2022 1319    Acceptance, E,TB, VU by RM at 11/20/2022 1449    Acceptance, E,TB, VU by RM at 11/18/2022 1021    Acceptance, E,TB, VU by RH at 11/16/2022 1110    Acceptance, E,TB, VU by KK at 11/14/2022 1638    Acceptance, E,TB, VU by KK at 11/13/2022 1648    Acceptance, E,TB, VU by KG at 11/10/2022 1827    Acceptance, E,TB, VU by DM at 11/9/2022 1638    Acceptance, E,TB, VU by KK at 11/8/2022 1754    Acceptance, E,TB, VU by KK at 11/7/2022 1635    Acceptance, E,D, VU,NR by LL at 11/5/2022 1325    Acceptance, E, VU by EB at 11/4/2022 1448    Acceptance, E,TB, VU by KM at 11/4/2022 1101   Family Acceptance, D,E, VU,NR by LL at 12/12/2022 1455                               User Key     Initials Effective Dates Name Provider Type Discipline    CL 06/16/21 -  Mitzy Mustafa, RN Registered Nurse Nurse    LL 05/02/16 -  Neda Garcia, PTA Physical Therapist Assistant PT    RM 06/14/22 -  Bethany Krause RN Registered Nurse Nurse    DM 06/16/21 -  Moniz, Deborah, RN Registered Nurse Nurse    BC 08/02/22 -  Collett, Brittany, RN Registered Nurse Nurse    AC 10/13/22 -  Jaya Castillo, RN Registered Nurse Nurse    KM 05/24/22 -  Mao Vázquez, PT Physical Therapist PT    EB 09/22/22 -  Cedric Oliver, RN Registered Nurse Nurse    KK 06/27/22 -  Chelsi Bolivar, RN Registered Nurse Nurse    DL 03/16/22 -  Francy Garcia, RN Registered Nurse Nurse    KG 09/22/22 -  Verónica Rodriguez, RN Registered Nurse Nurse    RW 09/22/22 -  Bethany Schofield RN Registered Nurse Nurse     08/03/22 -  Jung Russell RN [facility] Registered Nurse Nurse              PT Recommendation and Plan         Outcome Measures     Row Name 12/21/22 1500 12/20/22 1519 12/19/22 1500       How much help from another person do you currently need...    Turning from your back to your side while in  flat bed without using bedrails? 3  -LL 3  -LL 3  -LL    Moving from lying on back to sitting on the side of a flat bed without bedrails? 3  -LL 3  -LL 3  -LL    Moving to and from a bed to a chair (including a wheelchair)? 2  -LL 3  -LL 2  -LL    Standing up from a chair using your arms (e.g., wheelchair, bedside chair)? 2  -LL 2  -LL 2  -LL    Climbing 3-5 steps with a railing? 2  -LL 2  -LL 2  -LL    To walk in hospital room? 2  -LL 2  -LL 2  -LL    AM-PAC 6 Clicks Score (PT) 14  -LL 15  -LL 14  -LL       Functional Assessment    Outcome Measure Options AM-PAC 6 Clicks Basic Mobility (PT)  -LL -- AM-PAC 6 Clicks Basic Mobility (PT)  -LL    Row Name 12/19/22 1401             How much help from another is currently needed...    Putting on and taking off regular lower body clothing? 2  -KP      Bathing (including washing, rinsing, and drying) 3  -KP      Toileting (which includes using toilet bed pan or urinal) 3  -KP      Putting on and taking off regular upper body clothing 3  -KP      Taking care of personal grooming (such as brushing teeth) 3  -KP      Eating meals 4  -KP      AM-PAC 6 Clicks Score (OT) 18  -KP            User Key  (r) = Recorded By, (t) = Taken By, (c) = Cosigned By    Initials Name Provider Type    Neda Kyle PTA Physical Therapist Assistant    Linn Richardson, OT Occupational Therapist                 Time Calculation:    PT Charges     Row Name 12/21/22 1506             Time Calculation    Start Time 1245  -LL      Stop Time 1318  -LL      Time Calculation (min) 33 min  -LL      PT Received On 12/21/22  -LL         Time Calculation- PT    Total Timed Code Minutes- PT 33 minute(s)  -LL            User Key  (r) = Recorded By, (t) = Taken By, (c) = Cosigned By    Initials Name Provider Type    Neda Kyle PTA Physical Therapist Assistant              Therapy Charges for Today     Code Description Service Date Service Provider Modifiers Qty    94808970808 HC GAIT TRAINING EA  15 MIN 12/20/2022 Neda Garcia, NACHO GP, CQ 1    68251424869 HC PT THER PROC EA 15 MIN 12/20/2022 Neda Garcia, PTA GP, CQ 1    44033655508 HC PT THER PROC EA 15 MIN 12/21/2022 Neda Garcia, NACHO GP, CQ 1          PT G-Codes  Outcome Measure Options: AM-PAC 6 Clicks Basic Mobility (PT)  AM-PAC 6 Clicks Score (PT): 14  AM-PAC 6 Clicks Score (OT): 18    Hannah Garcia PTA  12/21/2022

## 2022-12-21 NOTE — PAYOR COMM NOTE
"Michelet Jacques RN  Swing Bed Nurse  [phone] Ex 4902 FAX: [phone]  [email or web address]  [facility]  NPI [id number]  Reference Number [id number]    Patient request for additional days for strength and mobility training . Needs additional skilled days of PT/OT for strength, mobility and transfer.    Tre Kinney (89 y.o. Female)    YOB: 1932  Social Security Number:   Address: [address] [place] [address]  Home Phone: [phone]  MRN: [id number]  Uatsdin: [employer]  Marital Status:         Admission Date: 10/5/22  Admission Type: Urgent  Admitting Provider: Jannet Lagunas MD  Attending Provider: Mustapha Loredo DO  Department, Room/Bed: [address], [facility]7/  Discharge Date:   Discharge Disposition:   Discharge Destination:               Attending Provider: Mustapha Loredo DO    Allergies: Adhesive Tape, Xanax [Alprazolam]    Isolation: None   Infection: None   Code Status: No CPR    Ht: 170.2 cm (67.01\")   Wt: 78.7 kg (173 lb 8 oz)    Admission Cmt: None   Principal Problem: Generalized weakness [R53.1]                 Active Insurance as of 10/5/2022     Primary Coverage     Payor Plan Insurance Group Employer/Plan Group    ANTHEM MEDICARE REPLACEMENT ANTH MEDICARE ADVANTAGE [id number]     Payor Plan Address Payor Plan Phone Number Payor Plan Fax Number Effective Dates    [address] [phone]  1/1/2022 - None Entered    [facility] [id number]       Subscriber Name Subscriber Birth Date Member ID       TRE KINNEY 12/9/1932 [id number]                 Emergency Contacts      (Rel.) Home Phone Work Phone Mobile Phone    Valdez Kinney (Son) [phone] None None    Leno Kinney (Son) [phone] None [phone]    Willian Kinney (Son) None None [phone]              Current Facility-Administered Medications   Medication Dose Route Frequency Provider Last Rate Last " Admin   • acetaminophen (TYLENOL) tablet 650 mg  650 mg Oral Q6H PRN Mustapha Loredo DO   650 mg at 12/19/22 0843   • apixaban (ELIQUIS) tablet 5 mg  5 mg Oral Q12H Mustapha Loredo DO   5 mg at 12/20/22 2035   • benzocaine-menthol (DERMOPLAST) 20-0.5 % topical spray   Topical 4x Daily PRN Kennedi Saucedo MD   Given at 11/03/22 0548   • Diclofenac Sodium (VOLTAREN) 1 % gel 4 g  4 g Topical 4x Daily Jannet Lagunas MD   4 g at 12/20/22 2035   • docusate sodium (COLACE) capsule 100 mg  100 mg Oral BID Jannet Lagunas MD   100 mg at 12/20/22 2035   • escitalopram (LEXAPRO) tablet 10 mg  10 mg Oral Daily Jannet Lagunas MD   10 mg at 12/20/22 0810   • furosemide (LASIX) tablet 40 mg  40 mg Oral BID Jannet Lagunas MD   40 mg at 12/20/22 1813   • gabapentin (NEURONTIN) capsule 300 mg  300 mg Oral Nightly Jannet Lagunas MD   300 mg at 12/20/22 2035   • Glycerin (INDRA/PED) suppository 1 g  1 g Rectal Q12H PRN Jannet Lagunas MD       • guaiFENesin-dextromethorphan (ROBITUSSIN DM) 100-10 MG/5ML syrup 5 mL  5 mL Oral Q4H PRN Kennedi Saucedo MD       • hydrALAZINE (APRESOLINE) injection 10 mg  10 mg Intravenous Q6H PRN Mustapha Loredo DO       • hydrALAZINE (APRESOLINE) tablet 5 mg  5 mg Oral Q8H Jannet Lagunas MD   5 mg at 12/21/22 0522   • HYDROcodone-acetaminophen (NORCO) 7.5-325 MG per tablet 1 tablet  1 tablet Oral Q8H PRN Jannet Lagunas MD   1 tablet at 12/08/22 2129   • magnesium citrate solution 296 mL  296 mL Oral Once Jannet Lagunas MD       • magnesium oxide (MAG-OX) tablet 400 mg  400 mg Oral Daily Jannet Lagunas MD   400 mg at 12/20/22 0810   • Magnesium Sulfate 2 gram Bolus, followed by 8 gram infusion (total Mg dose 10 grams)- Mg less than or equal to 1mg/dL  2 g Intravenous PRN Jannet Lagunas MD        Or   • Magnesium Sulfate 2 gram / 50mL Infusion (GIVE X 3 BAGS TO EQUAL 6GM TOTAL DOSE) - Mg 1.1 - 1.5 mg/dl  2 g Intravenous PRN Janneth  Jannet BALDWIN MD        Or   • Magnesium Sulfate 4 gram infusion- Mg 1.6-1.9 mg/dL  4 g Intravenous PRN Jannet Lagunas MD       • melatonin tablet 10 mg  10 mg Oral Nightly Jannet Lagunas MD   10 mg at 12/20/22 2035   • metoprolol tartrate (LOPRESSOR) tablet 25 mg  25 mg Oral Q12H Dante Levine MD   25 mg at 12/20/22 2035   • multivitamin (THERAGRAN) tablet 1 tablet  1 tablet Oral Daily Jannet Lagunas MD   1 tablet at 12/20/22 0810   • ondansetron (ZOFRAN) injection 4 mg  4 mg Intravenous Q6H PRN Jannet Lagunas MD       • pantoprazole (PROTONIX) EC tablet 40 mg  40 mg Oral Q AM Jannet Lagunas MD   40 mg at 12/21/22 0522   • phenol (CHLORASEPTIC) 1.4 % liquid 1 spray  1 spray Mouth/Throat Q2H PRN Jannet Lagunas MD   1 spray at 10/13/22 2056   • polyethylene glycol (MIRALAX) packet 17 g  17 g Oral BID PRN Jannet Lagunas MD   17 g at 12/14/22 0053   • potassium chloride (K-DUR,KLOR-CON) CR tablet 40 mEq  40 mEq Oral PRN Jannet Lagunas MD        Or   • potassium chloride (KLOR-CON) packet 40 mEq  40 mEq Oral PRN Jannet Lagunas MD        Or   • potassium chloride 10 mEq in 100 mL IVPB  10 mEq Intravenous Q1H PRN Jannet Lagunas MD       • prochlorperazine (COMPAZINE) injection 5 mg  5 mg Intravenous Q6H PRN Jannet Lagunas MD        Or   • prochlorperazine (COMPAZINE) tablet 5 mg  5 mg Oral Q6H PRN Jannet Lagunas MD   5 mg at 12/16/22 1046    Or   • prochlorperazine (COMPAZINE) suppository 25 mg  25 mg Rectal Q12H PRN Jannet Lagunas MD       • promethazine (PHENERGAN) 6.25 mg in sodium chloride 0.9 % 50 mL  6.25 mg Intravenous Q6H PRN Jannet Lagunas MD       • sodium chloride 0.9 % flush 10 mL  10 mL Intravenous PRN Jannet Lagunas MD       • sodium chloride 0.9 % flush 10 mL  10 mL Intravenous Q12H Jannet Lagunas MD   10 mL at 12/12/22 0804   • sodium chloride 0.9 % flush 10 mL  10 mL Intravenous PRN Jannet Lagunas MD       • sodium chloride 0.9 % flush 10  mL  10 mL Intravenous Q12H Jannet Lagunas MD   10 mL at 12/15/22 2117   • sodium chloride 0.9 % flush 10 mL  10 mL Intravenous PRN Jannet Lagunas MD       • tamsulosin (FLOMAX) 24 hr capsule 0.4 mg  0.4 mg Oral Daily Jannet Lagunas MD   0.4 mg at 12/20/22 0810        Physician Progress Notes (last 72 hours)      Mustapha Loredo DO at 12/20/22 1639        Pt admitted to [facility] on 10/5 for additional therapy in the setting of generalized weakness/debility. Pt not seen or examined today. I have reviewed her VS. I have discussed her care with Ed, RN who does not report any acute events or issues that need to be addressed. He does report some mild dyspnea and hypoxia at HS with increased fatigue reported. BP stable today. Cont current antihypertensive regimen. Cont Eliquis for treatment of DVT. Cont PT/OT. Letter of reconsideration approved through 12/21 with [facility] coordinator to send updated clinicals tomorrow. Cont supportive treatment.       Mustapha Loredo DO  12/20/22  15:52 EDT                Electronically signed by Mustapha Loredo DO at 12/20/22 1640     Mustapha Loredo DO at 12/19/22 1733                [facility] HOSPITALIST PROGRESS NOTE    Subjective     History:   Tre Kinney is a 89 y.o. female admitted on 10/5/2022 secondary to Generalized weakness     Procedures: None    CC: Follow up debility    Patient seen and examined with Yakelin, RN. Awake and alert. Sitting in bedside chair during my encounter. Leg pain appears stable. Reports some episodes of dry cough and dyspnea. No reported CP. Reports episodes of worsening generalized weakness.  No acute events overnight per RN.     History taken from: patient, chart, and RN.      Objective     Vital Signs  Temp:  [98.1 °F (36.7 °C)] 98.1 °F (36.7 °C)  Heart Rate:  [75-86] 86  Resp:  [16-18] 18  BP: (132-141)/(69-75) 132/69    Intake/Output Summary (Last 24 hours) at 12/19/2022 4836  Last data filed  at 12/19/2022 1400  Gross per 24 hour   Intake 960 ml   Output --   Net 960 ml         Physical Exam: Unchanged from previous.   General:    Awake, alert, in no acute distress, chronically ill appearing   Heart:      Normal S1 and S2. Regular rate and rhythm. No significant murmur, rubs or gallops appreciated.   Lungs:     Respirations regular, even and unlabored. Lungs clear to auscultation B/L. No wheezes, rales or rhonchi.   Abdomen:   Soft and nontender. No guarding, rebound tenderness or  organomegaly noted. Bowel sounds present x 4.   Extremities:  Trace bilateral pedal edema. Moves UE and LE equally B/L with generalized weakness.     Results Review:    Results from last 7 days   Lab Units 12/14/22  0049   WBC 10*3/mm3 5.92   HEMOGLOBIN g/dL 9.7*   PLATELETS 10*3/mm3 178     Results from last 7 days   Lab Units 12/19/22  0209 12/14/22  0049   SODIUM mmol/L 140 138   POTASSIUM mmol/L 3.7 3.8   CHLORIDE mmol/L 101 100   CO2 mmol/L 26.4 27.2   BUN mg/dL 28* 33*   CREATININE mg/dL 2.20* 2.20*   CALCIUM mg/dL 8.6 8.5   GLUCOSE mg/dL 102* 107*     Results from last 7 days   Lab Units 12/19/22  0209 12/14/22  0049   BILIRUBIN mg/dL 0.3 0.2   ALK PHOS U/L 50 51   AST (SGOT) U/L 6 10   ALT (SGPT) U/L <5 6     Results from last 7 days   Lab Units 12/19/22  0209 12/14/22  0049   MAGNESIUM mg/dL 2.1 1.8               Imaging Results (Last 24 Hours)     Procedure Component Value Units Date/Time    XR Chest 1 View [[id number]] Collected: 12/19/22 1448     Updated: 12/19/22 1450    Narrative:      EXAM:    XR Chest, 1 View     EXAM DATE:    12/19/2022 2:03 PM     CLINICAL HISTORY:    Cough and dyspnea     TECHNIQUE:    Frontal view of the chest.     COMPARISON:    11/05/2022     FINDINGS:    Lungs:  Unremarkable.  No consolidation.    Pleural space:  Unremarkable.  No pneumothorax.    Heart:  Cardiomegaly.    Mediastinum:  Large hiatal hernia.    Bones/joints:  Bony structures are stable.    Tubes, lines and devices:  Left  cardiac pacer device.       Impression:      1.  Cardiomegaly. No acute changes.  2.  Large hiatal hernia again noted.     This report was finalized on 2022 2:48 PM by Dr. Andres Guerra MD.               Medications:  apixaban, 5 mg, Oral, Q12H  Diclofenac Sodium, 4 g, Topical, 4x Daily  docusate sodium, 100 mg, Oral, BID  escitalopram, 10 mg, Oral, Daily  furosemide, 40 mg, Oral, BID  gabapentin, 300 mg, Oral, Nightly  hydrALAZINE, 5 mg, Oral, Q8H  magnesium citrate, 296 mL, Oral, Once  magnesium oxide, 400 mg, Oral, Daily  melatonin, 10 mg, Oral, Nightly  metoprolol tartrate, 25 mg, Oral, Q12H  multivitamin, 1 tablet, Oral, Daily  pantoprazole, 40 mg, Oral, Q AM  sodium chloride, 10 mL, Intravenous, Q12H  sodium chloride, 10 mL, Intravenous, Q12H  tamsulosin, 0.4 mg, Oral, Daily               Assessment & Plan   Generalized weakness/debility  Diffuse LLE DVT  DIANA on CKD IV  Acute blood loss anemia  Traumatic right gluteal hematoma  Mild hypokalemia   Paroxysmal Afib  Hx of SSS s/p pacemaker placement  Essential HTN  Reported hx of DVT's   Anxiety/depression  Advanced age    Plan  BP stable and controlled today on current regimen. Tolerating Eliquis with no reported signs of bleeding. Labs reviewed with overall stable renal function. Obtained CXR with no acute findings noted. Will hold on any additional diuresis today. Cont PT/OT as tolerated.     Disposition: Letter of reconsideration awaiting insurance response.            Mustapha Loredo DO  22  17:33 EST    Electronically signed by Mustapha Loredo DO at 22 6441     Jannet Lagunas MD at 22 1711              [facility] Emergency Room CROSS COVER NOTE    Patient Identification:  Name:  Tre Kinney  Age:  89 y.o.  Sex:  female  :  1932  MRN:  [id number]  Visit number:  [id number]  Primary Care Provider:  Jessica Pedro APRN    Length of stay in inpatient status:  74    Brief Update     Today, the  patient states that she is doing okay; she was sitting in the bedside chair.  She had some shortness of air overnight and she needed 1 L for a sat of 89%.  Otherwise, she denies any chest pain, denies nausea, denies emesis, denies diarrhea, denies shortness of air currently.  She is not coughing.  On exam, she has clear lungs; she also has is in regular rate and rhythm.  Her ankles are swollen but they look about the same as the last few days.  Will obtain labs in the morning; if the ankles are still swollen and the Cr is stable, then our team will consider giving an extra dose of Lasix.  I reviewed the medication administration record and the patient has been receiving her scheduled twice a day Lasix.  Await the letter of reconsideration decision.    Jannet Lagunas MD  [facility]  12/18/22  17:11 EST      Electronically signed by Jannet Lagunas MD at 12/18/22 1831          Consult Notes (most recent note)      Andrew Chapman MD at 11/03/22 1249      Consult Orders    1. Inpatient Psychiatrist Consult [[id number]] ordered by Kennedi Saucedo MD at 11/03/22 0744    2. Inpatient Psychiatrist Consult [[id number]] ordered by Kennedi Saucedo MD at 10/31/22 1147                   Referring Provider: Lewis  Reason for Consultation: Capacity      Chief complaint/Focus of Exam: Capacity Evaluation     Subjective .     History of present illness: Patient is an 89-year-old female with a history significant for CHF, CKD, CAD, history of clots, chronic anticoagulation, sick sinus syndrome with pacer placement, chronic diastolic CHF, anemia, and depression who originally admitted to the hospital after a fall.  Psychiatry was originally consulted early in her hospitalization 1 month ago for depressive symptoms and for medication management in light of hyponatremia.  I evaluated patient at that time.  Psychiatry was reconsulted to evaluate patient for medical decision-making capacity as  there have been some concerns about treatment care planning going forward and family has been involved.  I evaluated patient at bedside today while she was eating lunch.  She was sitting up and reported that she was having some neck pain but denied any other complaints.  She was alert and oriented x4.  She is able to answer all my questions appropriately.  She was not experiencing any confusion, hallucinations, delusions, or paranoia.  She denied SI/HI/AVH.  She was making jokes about being in the hospital and seemed to be in good spirits despite her neck pain.    Review of Systems  Pertinent items are noted in HPI    History  Past Medical History:   Diagnosis Date   • Anemia    • Arthritis    • CHF (congestive heart failure) (HCC)    • Chronic kidney disease    • Coronary artery disease    • H/O blood clots    • History of transfusion     no reaction    • Hypertension    ,   Past Surgical History:   Procedure Laterality Date   • ABDOMINAL SURGERY     • APPENDECTOMY     • CARDIAC SURGERY     •  SECTION     • COLONOSCOPY N/A 3/7/2018    Procedure: COLONOSCOPY;  Surgeon: Willie Gresham MD;  Location:  [facility];  Service:    • CORONARY ANGIOPLASTY WITH STENT PLACEMENT     • ENDOSCOPY N/A 3/6/2018    Procedure: ESOPHAGOGASTRODUODENOSCOPY;  Surgeon: Willie Gresham MD;  Location:  [facility];  Service:    • PACEMAKER IMPLANTATION      left side    • SKIN BIOPSY     • TUBAL ABDOMINAL LIGATION     ,   Family History   Problem Relation Age of Onset   • Other Mother         cardiac disorder   • Hypertension Mother    • Heart disease Mother    • Thyroid disease Mother    • Other Father         cardiac disorder   • Heart disease Father    • Hypertension Father    • Other Sister         cardiac disorder   • Other Brother         cardiac disorder   • Other Other         cardiac disorder   • Other Other         cardiac disorder   ,   Social History     Socioeconomic History   • Marital status:     Tobacco Use   • Smoking status: Never   Substance and Sexual Activity   • Alcohol use: No   • Drug use: No   • Sexual activity: Defer     E-cigarette/Vaping     E-cigarette/Vaping Substances     E-cigarette/Vaping Devices       ,   Medications Prior to Admission   Medication Sig Dispense Refill Last Dose   • ALPRAZolam (XANAX) 1 MG tablet Take 1 mg by mouth 2 (Two) Times a Day As Needed for Anxiety.   Unknown at Unknown time   • aspirin 81 MG EC tablet Take 81 mg by mouth Daily.   Unknown at Unknown time   • escitalopram (LEXAPRO) 10 MG tablet Take 10 mg by mouth Daily.   Unknown at Unknown time   • furosemide (LASIX) 40 MG tablet Take 40 mg by mouth Daily.   Unknown at Unknown time   • gabapentin (NEURONTIN) 400 MG capsule Take 400 mg by mouth Every Night.   Unknown at Unknown time   • hydrALAZINE (APRESOLINE) 100 MG tablet Take 1 tablet by mouth Every 8 (Eight) Hours. 90 tablet 0 Unknown at Unknown time   • HYDROcodone-acetaminophen (NORCO) 7.5-325 MG per tablet Take 1 tablet by mouth Every 8 (Eight) Hours As Needed for Moderate Pain.   Unknown at Unknown time   • isosorbide mononitrate (IMDUR) 60 MG 24 hr tablet Take 60 mg by mouth Daily.   Unknown at Unknown time   • metoprolol tartrate (LOPRESSOR) 50 MG tablet Take 50 mg by mouth 2 (Two) Times a Day.   Unknown at Unknown time   • pantoprazole (PROTONIX) 40 MG EC tablet Take 40 mg by mouth Daily.   Unknown at Unknown time   • potassium chloride (MICRO-K) 10 MEQ CR capsule Take 10 mEq by mouth 2 (Two) Times a Day.   Unknown at Unknown time   • warfarin (COUMADIN) 6 MG tablet Take 6 mg by mouth Daily.   Unknown at Unknown time   , Scheduled Meds:  apixaban, 5 mg, Oral, Q12H  escitalopram, 10 mg, Oral, Daily  furosemide, 40 mg, Oral, BID  gabapentin, 300 mg, Oral, Nightly  hydrALAZINE, 5 mg, Oral, Q8H  melatonin, 10 mg, Oral, Nightly  metoprolol tartrate, 12.5 mg, Oral, Q12H  multivitamin, 1 tablet, Oral, Daily  pantoprazole, 40 mg, Oral, Q AM  sodium chloride,  10 mL, Intravenous, Q12H  sodium chloride, 10 mL, Intravenous, Q12H  tamsulosin, 0.4 mg, Oral, Daily    , Continuous Infusions:  Pharmacy Consult,     , PRN Meds:  •  benzocaine-menthol  •  docusate sodium  •  HYDROcodone-acetaminophen  •  magnesium sulfate **OR** magnesium sulfate **OR** magnesium sulfate  •  ondansetron  •  Pharmacy Consult  •  phenol  •  polyethylene glycol  •  potassium chloride **OR** potassium chloride **OR** potassium chloride  •  prochlorperazine **OR** prochlorperazine **OR** prochlorperazine  •  promethazine  •  sodium chloride  •  sodium chloride  •  sodium chloride and Allergies:  Adhesive tape and Xanax [alprazolam]    Objective     Vital Signs   Temp:  [98.2 °F (36.8 °C)-98.4 °F (36.9 °C)] 98.2 °F (36.8 °C)  Heart Rate:  [71-77] 75  Resp:  [16-18] 16  BP: (129-161)/() 161/116    Mental Status Exam:   Mental Status Exam:    Hygiene:   good  Cooperation:  Cooperative  Eye Contact:  Good  Psychomotor Behavior:  Slow  Affect:  Full range  Hopelessness: Optimistic  Speech:  Normal  Thought Progress:  Goal directed and Linear  Thought Content:  Normal and Mood congruent  Suicidal:  None  Homicidal:  None  Hallucinations:  None  Delusion:  None  Memory:  Intact  Orientation:  Person, Place, Time and Situation  Reliability:  good  Insight:  Fair  Judgement:  Fair  Impulse Control:  Fair    Results Review:   I reviewed the patient's new clinical results.  Lab Results (last 24 hours)     ** No results found for the last 24 hours. **        Imaging Results (Last 24 Hours)     ** No results found for the last 24 hours. **            Assessment & Plan     Evaluation for medical decision making capacity  -At this point in time, patient does have capacity for medical decision making.  She is alert and oriented and able to answer questions appropriately and I do not have any concerns about her making her own treatment plan and decisions going forward.    I discussed the patients findings and my  recommendations with patient    Andrew Chapman MD  22  12:49 EDT        Electronically signed by Andrew Chapman MD at 22 1253          Nutrition Notes (most recent note)      Magi Oneal RD at 22 1010        Nutrition Services    Patient Name:  Tre Kinney  YOB: 1932  MRN: [id number]  Admit Date:  10/5/2022    BMI: 27.09  Diet: Regular  Supplement: Magic cup BID  Intake: 73% x 12 meals  Fluid: not meeting needs      Electronically signed by:  Magi Oneal RD  22 10:11 EST     Electronically signed by Magi Oneal RD at 22 1011          Physical Therapy Notes (most recent note)      Neda Garcia PTA at 22 1528  Version 1 of 1         Acute Care - Physical Therapy Treatment Note  CODY Ellison     Patient Name: Tre Kinney  : 1932  MRN: [id number]  Today's Date: 2022   Onset of Illness/Injury or Date of Surgery: 10/06/22  Visit Dx:   No diagnosis found.  Patient Active Problem List   Diagnosis   • Acid reflux   • Ankle arthralgia   • Cardiac conduction disorder   • Closed fracture of distal fibula   • Arteriosclerosis of coronary artery   • Fracture of distal end of tibia   • Elevated cholesterol   • BP (high blood pressure)   • Hypertrophic polyarthritis   • OP (osteoporosis)   • Right heart failure (HCC)   • Heart failure, chronic, right-sided (HCC)   • SOB (shortness of breath)   • Palpitations   • CHF (congestive heart failure) (HCC)   • Congestive heart failure (HCC)   • Precordial pain   • Chronic anticoagulation   • Hypokalemia   • Anemia, unspecified type   • Generalized weakness     Past Medical History:   Diagnosis Date   • Anemia    • Arthritis    • CHF (congestive heart failure) (HCC)    • Chronic kidney disease    • Coronary artery disease    • H/O blood clots    • History of transfusion     no reaction    • Hypertension      Past Surgical History:   Procedure Laterality Date   • ABDOMINAL SURGERY     • APPENDECTOMY     •  CARDIAC SURGERY     •  SECTION     • COLONOSCOPY N/A 3/7/2018    Procedure: COLONOSCOPY;  Surgeon: Willie Gresham MD;  Location:  [facility];  Service:    • CORONARY ANGIOPLASTY WITH STENT PLACEMENT     • ENDOSCOPY N/A 3/6/2018    Procedure: ESOPHAGOGASTRODUODENOSCOPY;  Surgeon: Willie Gresham MD;  Location:  [facility];  Service:    • PACEMAKER IMPLANTATION      left side    • SKIN BIOPSY     • TUBAL ABDOMINAL LIGATION       PT Assessment (last 12 hours)     PT Evaluation and Treatment     Row Name 22 1330          Physical Therapy Time and Intention    Subjective Information complains of;weakness  -LL     Document Type therapy note (daily note)  -LL     Mode of Treatment physical therapy;individual therapy  -LL     Patient Effort good  -LL     Comment Patient seen for PT this date 13:30=14:24 with OT present to assist with functional mobility for safety.  Patient participated in LE strengthening ex's, transfers and gait training this date.  -LL     Row Name 22 1330          General Information    Patient Profile Reviewed yes  -LL     Row Name 22 1330          Cognition    Affect/Mental Status (Cognition) WFL  -LL     Orientation Status (Cognition) oriented x 4  -LL     Follows Commands (Cognition) WFL  -LL     Personal Safety Interventions fall prevention program maintained;gait belt;nonskid shoes/slippers when out of bed;supervised activity  -LL     Row Name 22 1330          Bed Mobility    Comment, (Bed Mobility) Patient UIC  -LL     Row Name 22 1330          Transfers    Transfers sit-stand transfer;stand-sit transfer  -LL     Row Name 22 1330          Sit-Stand Transfer    Sit-Stand [place] (Transfers) minimum assist (75% patient effort);verbal cues;moderate assist (50% patient effort)  For proper hand placement  -LL     Assistive Device (Sit-Stand Transfers) walker, front-wheeled  -LL     Comment, (Sit-Stand Transfer) x 3 trials  -LL     Row Name  12/20/22 1330          Stand-Sit Transfer    Stand-Sit [place] (Transfers) moderate assist (50% patient effort);minimum assist (75% patient effort)  with verbal cueing to reach back for chair  -LL     Assistive Device (Stand-Sit Transfers) walker, front-wheeled  -LL     Comment, (Stand-Sit Transfer) x 3 trials  -LL     Row Name 12/20/22 1330          Gait/Stairs (Locomotion)    [place] Level (Gait) moderate assist (50% patient effort);verbal cues;nonverbal cues (demo/gesture);minimum assist (75% patient effort);1 person assist;1 person to manage equipment  -LL     Assistive Device (Gait) walker, front-wheeled  -LL     Distance in Feet (Gait) 10', 20', 27'  -LL     Pattern (Gait) step-to  -LL     Deviations/Abnormal Patterns (Gait) stride length decreased;gait speed decreased;festinating/shuffling;weight shifting decreased  -LL     Bilateral Gait Deviations weight shift ability decreased;forward flexed posture  -LL     Row Name 12/20/22 1330          Hip (Therapeutic Exercise)    Hip Strengthening (Therapeutic Exercise) bilateral;flexion;extension;aBduction;aDduction;marching while seated;sitting;resistance band;red  -LL     Row Name 12/20/22 1330          Knee (Therapeutic Exercise)    Knee Strengthening (Therapeutic Exercise) bilateral;flexion;extension;marching while seated;LAQ (long arc quad);hamstring curls;sitting;resistance band;red  -LL     Row Name 12/20/22 1330          Ankle (Therapeutic Exercise)    Ankle Strengthening (Therapeutic Exercise) bilateral;dorsiflexion;plantarflexion;sitting  -LL     Row Name 12/20/22 1330          Coping    Observed Emotional State calm;cooperative;pleasant  -LL     Verbalized Emotional State acceptance  -LL     Row Name 12/20/22 1330          Positioning and Restraints    Pre-Treatment Position sitting in chair/recliner  -LL     Post Treatment Position chair  -LL     In Chair reclined;call light within reach;encouraged to call for assist  With tray table in front  of her  -LL           User Key  (r) = Recorded By, (t) = Taken By, (c) = Cosigned By    Initials Name Provider Type    Neda Kyle PTA Physical Therapist Assistant                Physical Therapy Education     Title: PT OT SLP Therapies (Done)     Topic: Physical Therapy (Done)     Point: Mobility training (Done)     Learning Progress Summary           Patient Acceptance, E, VU by CL at 12/17/2022 1703    Acceptance, E,D, VU,NR by LL at 12/16/2022 1605    Acceptance, E,TB, VU by AC at 12/16/2022 1523    Acceptance, E,TB, VU by AC at 12/15/2022 1618    Acceptance, E,TB, VU by AC at 12/14/2022 1503    Acceptance, E,TB, VU by DL at 12/13/2022 0240    Acceptance, D,E, VU,NR by LL at 12/12/2022 1455    Acceptance, E,TB, VU by RW at 12/9/2022 1451    Acceptance, E,TB, VU by BC at 11/27/2022 1319    Acceptance, E,TB, VU by RM at 11/20/2022 1449    Acceptance, E,TB, VU by RM at 11/18/2022 1021    Acceptance, E,TB, VU by RH at 11/16/2022 1110    Acceptance, E,TB, VU by KK at 11/14/2022 1638    Acceptance, E,TB, VU by KK at 11/13/2022 1648    Acceptance, E,TB, VU by KG at 11/10/2022 1827    Acceptance, E,TB, VU by DM at 11/9/2022 1638    Acceptance, E,TB, VU by KK at 11/8/2022 1754    Acceptance, E,TB, VU by KK at 11/7/2022 1635    Acceptance, E,D, VU,NR by LL at 11/5/2022 1325    Acceptance, E, VU by EB at 11/4/2022 1448    Acceptance, E,TB, VU by KM at 11/4/2022 1101   Family Acceptance, D,E, VU,NR by LL at 12/12/2022 1455                   Point: Home exercise program (Done)     Learning Progress Summary           Patient Acceptance, E, VU by CL at 12/17/2022 1703    Acceptance, E,D, VU,NR by LL at 12/16/2022 1605    Acceptance, E,TB, VU by AC at 12/16/2022 1523    Acceptance, E,TB, VU by AC at 12/15/2022 1618    Acceptance, E,TB, VU by AC at 12/14/2022 1503    Acceptance, E,TB, VU by DL at 12/13/2022 0240    Acceptance, D,E, VU,NR by LL at 12/12/2022 1455    Acceptance, E,TB, VU by RW at 12/9/2022 1451     Acceptance, E,TB, VU by BC at 11/27/2022 1319    Acceptance, E,TB, VU by RM at 11/20/2022 1449    Acceptance, E,TB, VU by RM at 11/18/2022 1021    Acceptance, E,TB, VU by RH at 11/16/2022 1110    Acceptance, E,TB, VU by KK at 11/14/2022 1638    Acceptance, E,TB, VU by KK at 11/13/2022 1648    Acceptance, E,TB, VU by KG at 11/10/2022 1827    Acceptance, E,TB, VU by DM at 11/9/2022 1638    Acceptance, E,TB, VU by KK at 11/8/2022 1754    Acceptance, E,TB, VU by KK at 11/7/2022 1635    Acceptance, E,D, VU,NR by LL at 11/5/2022 1325    Acceptance, E, VU by EB at 11/4/2022 1448    Acceptance, E,TB, VU by KM at 11/4/2022 1101   Family Acceptance, D,E, VU,NR by LL at 12/12/2022 1455                   Point: Body mechanics (Done)     Learning Progress Summary           Patient Acceptance, E, VU by CL at 12/17/2022 1703    Acceptance, E,D, VU,NR by LL at 12/16/2022 1605    Acceptance, E,TB, VU by AC at 12/16/2022 1523    Acceptance, E,TB, VU by AC at 12/15/2022 1618    Acceptance, E,TB, VU by AC at 12/14/2022 1503    Acceptance, E,TB, VU by DL at 12/13/2022 0240    Acceptance, D,E, VU,NR by LL at 12/12/2022 1455    Acceptance, E,TB, VU by RW at 12/9/2022 1451    Acceptance, E,TB, VU by BC at 11/27/2022 1319    Acceptance, E,TB, VU by RM at 11/20/2022 1449    Acceptance, E,TB, VU by RM at 11/18/2022 1021    Acceptance, E,TB, VU by RH at 11/16/2022 1110    Acceptance, E,TB, VU by KK at 11/14/2022 1638    Acceptance, E,TB, VU by KK at 11/13/2022 1648    Acceptance, E,TB, VU by KG at 11/10/2022 1827    Acceptance, E,TB, VU by DM at 11/9/2022 1638    Acceptance, E,TB, VU by KK at 11/8/2022 1754    Acceptance, E,TB, VU by KK at 11/7/2022 1635    Acceptance, E,D, VU,NR by LL at 11/5/2022 1325    Acceptance, E, VU by EB at 11/4/2022 1448    Acceptance, E,TB, VU by KM at 11/4/2022 1101   Family Acceptance, D,E, VU,NR by LL at 12/12/2022 1455                   Point: Precautions (Done)     Learning Progress Summary           Patient  Acceptance, E, VU by CL at 12/17/2022 1703    Acceptance, E,D, VU,NR by LL at 12/16/2022 1605    Acceptance, E,TB, VU by AC at 12/16/2022 1523    Acceptance, E,TB, VU by AC at 12/15/2022 1618    Acceptance, E,TB, VU by AC at 12/14/2022 1503    Acceptance, E,TB, VU by DL at 12/13/2022 0240    Acceptance, D,E, VU,NR by LL at 12/12/2022 1455    Acceptance, E,TB, VU by RW at 12/9/2022 1451    Acceptance, E,TB, VU by BC at 11/27/2022 1319    Acceptance, E,TB, VU by RM at 11/20/2022 1449    Acceptance, E,TB, VU by RM at 11/18/2022 1021    Acceptance, E,TB, VU by RH at 11/16/2022 1110    Acceptance, E,TB, VU by KK at 11/14/2022 1638    Acceptance, E,TB, VU by KK at 11/13/2022 1648    Acceptance, E,TB, VU by KG at 11/10/2022 1827    Acceptance, E,TB, VU by DM at 11/9/2022 1638    Acceptance, E,TB, VU by KK at 11/8/2022 1754    Acceptance, E,TB, VU by KK at 11/7/2022 1635    Acceptance, E,D, VU,NR by LL at 11/5/2022 1325    Acceptance, E, VU by EB at 11/4/2022 1448    Acceptance, E,TB, VU by KM at 11/4/2022 1101   Family Acceptance, D,E, VU,NR by LL at 12/12/2022 1455                               User Key     Initials Effective Dates Name Provider Type Discipline    CL 06/16/21 -  Mitzy Mustafa, RN Registered Nurse Nurse    LL 05/02/16 -  Neda Garcia PTA Physical Therapist Assistant PT    RM 06/14/22 -  Bethany Krause RN Registered Nurse Nurse    DM 06/16/21 -  Moniz, Deborah, RN Registered Nurse Nurse    BC 08/02/22 -  Collett, Brittany, RN Registered Nurse Nurse    AC 10/13/22 -  Jaya Castillo, RN Registered Nurse Nurse    KM 05/24/22 -  Mao Vázquez, PT Physical Therapist PT    EB 09/22/22 -  Cedric Oliver, RN Registered Nurse Nurse    KK 06/27/22 -  Chelsi Bolivar, RN Registered Nurse Nurse    DL 03/16/22 -  Francy Garcia, RN Registered Nurse Nurse    KG 09/22/22 -  Verónica Rodriguez, RN Registered Nurse Nurse    RW 09/22/22 -  Bethany Schofield, RN Registered Nurse Nurse     08/03/22 -  Jung Russell  RN Extern Registered Nurse Nurse              PT Recommendation and Plan         Outcome Measures     Row Name 12/20/22 1519 12/19/22 1500 12/19/22 1401       How much help from another person do you currently need...    Turning from your back to your side while in flat bed without using bedrails? 3  -LL 3  -LL --    Moving from lying on back to sitting on the side of a flat bed without bedrails? 3  -LL 3  -LL --    Moving to and from a bed to a chair (including a wheelchair)? 3  -LL 2  -LL --    Standing up from a chair using your arms (e.g., wheelchair, bedside chair)? 2  -LL 2  -LL --    Climbing 3-5 steps with a railing? 2  -LL 2  -LL --    To walk in hospital room? 2  -LL 2  -LL --    AM-PAC 6 Clicks Score (PT) 15  -LL 14  -LL --       How much help from another is currently needed...    Putting on and taking off regular lower body clothing? -- -- 2  -KP    Bathing (including washing, rinsing, and drying) -- -- 3  -KP    Toileting (which includes using toilet bed pan or urinal) -- -- 3  -KP    Putting on and taking off regular upper body clothing -- -- 3  -KP    Taking care of personal grooming (such as brushing teeth) -- -- 3  -KP    Eating meals -- -- 4  -KP    AM-PAC 6 Clicks Score (OT) -- -- 18  -KP       Functional Assessment    Outcome Measure Options -- AM-PAC 6 Clicks Basic Mobility (PT)  -LL --          User Key  (r) = Recorded By, (t) = Taken By, (c) = Cosigned By    Initials Name Provider Type    LL Neda Garcia, PTA Physical Therapist Assistant    Linn Richardson, OT Occupational Therapist                 Time Calculation:    PT Charges     Row Name 12/20/22 1526             Time Calculation    Start Time 1330  -LL      Stop Time 1424  -LL      Time Calculation (min) 54 min  -LL      PT Received On 12/20/22  -         Time Calculation- PT    Total Timed Code Minutes- PT 54 minute(s)  -LL            User Key  (r) = Recorded By, (t) = Taken By, (c) = Cosigned By    Initials Name Provider Type     LL Neda Garcia PTA Physical Therapist Assistant              Therapy Charges for Today     Code Description Service Date Service Provider Modifiers Qty    64802915722 HC GAIT TRAINING EA 15 MIN 2022 Neda Garcia PTA GP, CQ 1    13889084885 HC PT THER PROC EA 15 MIN 2022 Neda Garcia PTA GP, CQ 1    93916944442 HC GAIT TRAINING EA 15 MIN 2022 Neda Garcia PTA GP, CQ 1    77645343557 HC PT THER PROC EA 15 MIN 2022 Neda Garcia PTA GP, CQ 1          PT G-Codes  Outcome Measure Options: AM-PAC 6 Clicks Daily Activity (OT)  AM-PAC 6 Clicks Score (PT): 15  AM-PAC 6 Clicks Score (OT): 18    Neda. NACHO Garcia  2022      Electronically signed by Neda Garcia PTA at 22 1529          Occupational Therapy Notes (most recent note)      Linn Quevedo OT at 22 1520          Acute Care - Occupational Therapy Treatment Note  [facility]     Patient Name: Tre Kinney  : 1932  MRN: [id number]  Today's Date: 2022  Onset of Illness/Injury or Date of Surgery: 10/06/22     Referring Physician: Janneth    Admit Date: 10/5/2022     No diagnosis found.  Patient Active Problem List   Diagnosis   • Acid reflux   • Ankle arthralgia   • Cardiac conduction disorder   • Closed fracture of distal fibula   • Arteriosclerosis of coronary artery   • Fracture of distal end of tibia   • Elevated cholesterol   • BP (high blood pressure)   • Hypertrophic polyarthritis   • OP (osteoporosis)   • Right heart failure (HCC)   • Heart failure, chronic, right-sided (HCC)   • SOB (shortness of breath)   • Palpitations   • CHF (congestive heart failure) (HCC)   • Congestive heart failure (HCC)   • Precordial pain   • Chronic anticoagulation   • Hypokalemia   • Anemia, unspecified type   • Generalized weakness     Past Medical History:   Diagnosis Date   • Anemia    • Arthritis    • CHF (congestive heart failure) (HCC)    • Chronic kidney disease    • Coronary artery disease     • H/O blood clots    • History of transfusion     no reaction    • Hypertension      Past Surgical History:   Procedure Laterality Date   • ABDOMINAL SURGERY     • APPENDECTOMY     • CARDIAC SURGERY     •  SECTION     • COLONOSCOPY N/A 3/7/2018    Procedure: COLONOSCOPY;  Surgeon: Willie Gresham MD;  Location:  [facility];  Service:    • CORONARY ANGIOPLASTY WITH STENT PLACEMENT     • ENDOSCOPY N/A 3/6/2018    Procedure: ESOPHAGOGASTRODUODENOSCOPY;  Surgeon: Willie Gresham MD;  Location:  [facility];  Service:    • PACEMAKER IMPLANTATION      left side    • SKIN BIOPSY     • TUBAL ABDOMINAL LIGATION           OT ASSESSMENT FLOWSHEET (last 12 hours)     OT Evaluation and Treatment     Row Name 22 1515                   OT Time and Intention    Subjective Information fatigue  -KP        Document Type therapy note (daily note)  -KP        Mode of Treatment individual therapy;occupational therapy  -KP        Total Minutes, Occupational Therapy 54  -KP        Patient Effort good  -KP        Symptoms Noted During/After Treatment none  -KP        Comment Patient seen for OT treatment session. PT present due to 2 person assist during functional mobility for safety. Patient's son on Facetime throughout session.  -KP           General Information    Patient Profile Reviewed yes  -KP        Existing Precautions/Restrictions fall  -KP        Limitations/Impairments safety/cognitive  -KP           Pain Assessment    Pretreatment Pain Rating 0/10 - no pain  -KP        Posttreatment Pain Rating 0/10 - no pain  -KP           Cognition    Affect/Mental Status (Cognition) WFL  -KP        Orientation Status (Cognition) oriented x 4  -KP        Follows Commands (Cognition) WFL  -KP        Personal Safety Interventions fall prevention program maintained;gait belt  -KP           Functional Mobility    Functional Mobility- Comment Completed X3 trials with rolling walker and minimal assist and second person to  follow with chair for safety. Completed X8 feet, X12 feet, X16 feet.  -KP           Sit-Stand Transfer    Sit-Stand [place] (Transfers) minimum assist (75% patient effort);moderate assist (50% patient effort);1 person assist  -KP        Assistive Device (Sit-Stand Transfers) walker, front-wheeled  -KP        Comment, (Sit-Stand Transfer) X3 trials  -KP           Stand-Sit Transfer    Stand-Sit [place] (Transfers) minimum assist (75% patient effort);1 person assist  -KP        Assistive Device (Stand-Sit Transfers) walker, front-wheeled  -KP        Comment, (Stand-Sit Transfer) X3 trials  -KP           Motor Skills    Functional Endurance fair  -KP        Therapeutic Exercise other (see comments)  BUE exercises with red theraband through functional movement patterns X20 reps X2 sets; AROM for shoulder flexion  -KP              User Key  (r) = Recorded By, (t) = Taken By, (c) = Cosigned By    Initials Name Effective Dates    KP Linn Quevedo, VELASQUEZ 06/16/21 -                        OT Recommendation and Plan     Progress Toward Functional Goals (OT): progress toward functional goals is gradual  Plan of Care Review  Plan of Care Reviewed With: patient  Progress: improving  Outcome Evaluation: Patient seen for OT treatment session 8:55 to 9:44. Initial session addressed functional transfers/mobility with PT present. Then OT addressed UE strength, endurance, and ROM through seated exercises. She then was seen for ADL retraining as documented for toileting. Continue plan of care.  Plan of Care Reviewed With: patient  Outcome Evaluation: Patient seen for OT treatment session 8:55 to 9:44. Initial session addressed functional transfers/mobility with PT present. Then OT addressed UE strength, endurance, and ROM through seated exercises. She then was seen for ADL retraining as documented for toileting. Continue plan of care.     Outcome Measures     Row Name 12/19/22 1500 12/19/22 1401          How much help from  another person do you currently need...    Turning from your back to your side while in flat bed without using bedrails? 3  -LL --     Moving from lying on back to sitting on the side of a flat bed without bedrails? 3  -LL --     Moving to and from a bed to a chair (including a wheelchair)? 2  -LL --     Standing up from a chair using your arms (e.g., wheelchair, bedside chair)? 2  -LL --     Climbing 3-5 steps with a railing? 2  -LL --     To walk in hospital room? 2  -LL --     AM-PAC 6 Clicks Score (PT) 14  -LL --        How much help from another is currently needed...    Putting on and taking off regular lower body clothing? -- 2  -KP     Bathing (including washing, rinsing, and drying) -- 3  -KP     Toileting (which includes using toilet bed pan or urinal) -- 3  -KP     Putting on and taking off regular upper body clothing -- 3  -KP     Taking care of personal grooming (such as brushing teeth) -- 3  -KP     Eating meals -- 4  -KP     AM-PAC 6 Clicks Score (OT) -- 18  -        Functional Assessment    Outcome Measure Options AM-PAC 6 Clicks Basic Mobility (PT)  -LL --           User Key  (r) = Recorded By, (t) = Taken By, (c) = Cosigned By    Initials Name Provider Type     Neda Garcia PTA Physical Therapist Assistant     Linn Quevedo OT Occupational Therapist                Time Calculation:    Time Calculation- OT     Row Name 12/20/22 1520             Time Calculation- OT    OT Start Time 1330  -      OT Stop Time 1424  -      OT Time Calculation (min) 54 min  -      Total Timed Code Minutes- OT 54 minute(s)  -      OT Received On 12/20/22  -            User Key  (r) = Recorded By, (t) = Taken By, (c) = Cosigned By    Initials Name Provider Type     Linn Quevedo OT Occupational Therapist              Therapy Charges for Today     Code Description Service Date Service Provider Modifiers Qty    38461215823  OT THER PROC EA 15 MIN 12/19/2022 Linn Quevedo OT GO 1    36908439811  HC OT THER PROC EA 15 MIN 12/20/2022 Linn Quevedo OT GO 2               Linn Quevedo OT  12/20/2022    Electronically signed by Linn Quevedo OT at 12/20/22 1521       Discharge Planning Assessment  [facility]     Patient Name: Tre Kinney                    MRN: [id number]  Today's Date: 12/20/2022                   Admit Date: 10/5/2022                Discharge Plan      Row Name 12/20/22 1055             Plan      Plan SS received call from Swing bed RN who states letter of reconsideration was approved from 12/15/22 - 12/21/22. Swing bed RN to send updated clinical on 12/21/22. SS to follow.                     TERESITA WillsW

## 2022-12-21 NOTE — PLAN OF CARE
Goal Outcome Evaluation:  Plan of Care Reviewed With: patient        Progress: improving  Outcome Evaluation: Up in chair several hours.  Worked with physical therapy tolerated well.  Continue to monitor.

## 2022-12-21 NOTE — THERAPY TREATMENT NOTE
Acute Care - Occupational Therapy    [place]     Patient Name: Tre Kinney  : 1932  MRN: [id number]  Today's Date: 2022  Onset of Illness/Injury or Date of Surgery: 10/06/22     Referring Physician: Janneth    Admit Date: 10/5/2022     No diagnosis found.  Patient Active Problem List   Diagnosis   • Acid reflux   • Ankle arthralgia   • Cardiac conduction disorder   • Closed fracture of distal fibula   • Arteriosclerosis of coronary artery   • Fracture of distal end of tibia   • Elevated cholesterol   • BP (high blood pressure)   • Hypertrophic polyarthritis   • OP (osteoporosis)   • Right heart failure (HCC)   • Heart failure, chronic, right-sided (HCC)   • SOB (shortness of breath)   • Palpitations   • CHF (congestive heart failure) (HCC)   • Congestive heart failure (HCC)   • Precordial pain   • Chronic anticoagulation   • Hypokalemia   • Anemia, unspecified type   • Generalized weakness     Past Medical History:   Diagnosis Date   • Anemia    • Arthritis    • CHF (congestive heart failure) ([facility])    • Chronic kidney disease    • Coronary artery disease    • H/O blood clots    • History of transfusion     no reaction    • Hypertension      Past Surgical History:   Procedure Laterality Date   • ABDOMINAL SURGERY     • APPENDECTOMY     • CARDIAC SURGERY     •  SECTION     • COLONOSCOPY N/A 3/7/2018    Procedure: COLONOSCOPY;  Surgeon: Willie Gresham MD;  Location:  [facility];  Service:    • CORONARY ANGIOPLASTY WITH STENT PLACEMENT     • ENDOSCOPY N/A 3/6/2018    Procedure: ESOPHAGOGASTRODUODENOSCOPY;  Surgeon: Willie Gresham MD;  Location:  [facility];  Service:    • PACEMAKER IMPLANTATION      left side    • SKIN BIOPSY     • TUBAL ABDOMINAL LIGATION           OT ASSESSMENT FLOWSHEET (last 12 hours)     OT Evaluation and Treatment     Row Name 22 1503                   OT Time and Intention    Subjective Information fatigue  -KP        Document Type therapy note (daily  note)  -        Mode of Treatment occupational therapy;individual therapy  -        Total Minutes, Occupational Therapy 33  -KP        Patient Effort good  -KP        Comment Patient seen for OT treatment session. She stated she felt fatigued on this date. PT present for safety with functional mobility. Her son was present through Facetime during session. Seen 12:45 to 13:18.  -           General Information    Patient Profile Reviewed yes  -        General Observations of Patient Patient agreeable and pleasant throughout.  -        Existing Precautions/Restrictions fall  -        Limitations/Impairments safety/cognitive  -        Equipment Issued to Patient gait belt  -           Pain Assessment    Pretreatment Pain Rating 1/10  -        Posttreatment Pain Rating 1/10  -        Pain Location - Side/Orientation Left  -        Pain Location - toe;other (see comments)  -           Cognition    Affect/Mental Status (Cognition) [facility]  -        Orientation Status (Cognition) oriented x 4  -        Follows Commands (Cognition) [facility]  -           Transfer Assessment/Treatment    Transfers sit-stand transfer;stand-sit transfer  -           Chair-Bed Transfer    Chair-Bed Andrew (Transfers) minimum assist (75% patient effort);moderate assist (50% patient effort);1 person assist  -        Assistive Device (Chair-Bed Transfers) walker, front-wheeled  -           Sit-Stand Transfer    Sit-Stand Andrew (Transfers) minimum assist (75% patient effort);moderate assist (50% patient effort);1 person assist  -        Assistive Device (Sit-Stand Transfers) walker, front-wheeled  -        Comment, (Sit-Stand Transfer) X2 trials  -           Stand-Sit Transfer    Stand-Sit Andrew (Transfers) minimum assist (75% patient effort);1 person assist  -        Assistive Device (Stand-Sit Transfers) walker, front-wheeled  -        Comment, (Stand-Sit Transfer) X2 trials  [facility]            Motor Skills    Functional Endurance fair  -KP        Therapeutic Exercise other (see comments)  BUE exercises through functional movement patterns with red theraband (shoulder flexion AROM) X20 reps X2 sets  -KP           Coping    Observed Emotional State pleasant;cooperative;calm  -KP           Positioning and Restraints    Pre-Treatment Position sitting in chair/recliner  -KP        Post Treatment Position bed  -KP        In Bed call light within reach;encouraged to call for assist  -KP           Progress Summary (OT)    Progress Toward Functional Goals (OT) progress toward functional goals is fair  -KP              User Key  (r) = Recorded By, (t) = Taken By, (c) = Cosigned By    Initials Name Effective Dates    KP Linn Quevedo, OT 06/16/21 -                        OT Recommendation and Plan     Progress Toward Functional Goals (OT): progress toward functional goals is fair  Plan of Care Review  Plan of Care Reviewed With: patient  Progress: improving  Outcome Evaluation: Patient seen for OT treatment session 8:55 to 9:44. Initial session addressed functional transfers/mobility with PT present. Then OT addressed UE strength, endurance, and ROM through seated exercises. She then was seen for ADL retraining as documented for toileting. Continue plan of care.  Plan of Care Reviewed With: patient  Outcome Evaluation: Patient seen for OT treatment session 8:55 to 9:44. Initial session addressed functional transfers/mobility with PT present. Then OT addressed UE strength, endurance, and ROM through seated exercises. She then was seen for ADL retraining as documented for toileting. Continue plan of care.     Outcome Measures     Row Name 12/21/22 1500 12/20/22 1519 12/19/22 1500       How much help from another person do you currently need...    Turning from your back to your side while in flat bed without using bedrails? 3  -LL 3  -LL 3  -LL    Moving from lying on back to sitting on the side of a flat bed without  bedrails? 3  -LL 3  -LL 3  -LL    Moving to and from a bed to a chair (including a wheelchair)? 2  -LL 3  -LL 2  -LL    Standing up from a chair using your arms (e.g., wheelchair, bedside chair)? 2  -LL 2  -LL 2  -LL    Climbing 3-5 steps with a railing? 2  -LL 2  -LL 2  -LL    To walk in hospital room? 2  -LL 2  -LL 2  -LL    AM-PAC 6 Clicks Score (PT) 14  -LL 15  -LL 14  -LL       Functional Assessment    Outcome Measure Options AM-PAC 6 Clicks Basic Mobility (PT)  -LL -- AM-PAC 6 Clicks Basic Mobility (PT)  -LL    Row Name 12/19/22 1401             How much help from another is currently needed...    Putting on and taking off regular lower body clothing? 2  -KP      Bathing (including washing, rinsing, and drying) 3  -KP      Toileting (which includes using toilet bed pan or urinal) 3  -KP      Putting on and taking off regular upper body clothing 3  -KP      Taking care of personal grooming (such as brushing teeth) 3  -KP      Eating meals 4  -KP      AM-PAC 6 Clicks Score (OT) 18  -KP            User Key  (r) = Recorded By, (t) = Taken By, (c) = Cosigned By    Initials Name Provider Type     Neda Garcia PTA Physical Therapist Assistant    Linn Richardson OT Occupational Therapist                Time Calculation:    Time Calculation- OT     Row Name 12/21/22 1510             Time Calculation- OT    OT Start Time 1245  -      OT Stop Time 1318  -      OT Time Calculation (min) 33 min  -      Total Timed Code Minutes- OT 33 minute(s)  -      OT Received On 12/21/22  -            User Key  (r) = Recorded By, (t) = Taken By, (c) = Cosigned By    Initials Name Provider Type    Linn Richardson OT Occupational Therapist              Therapy Charges for Today     Code Description Service Date Service Provider Modifiers Qty    70431054639 HC OT THER PROC EA 15 MIN 12/20/2022 Linn Quevedo OT GO 2    35638160004 HC OT THER PROC EA 15 MIN 12/21/2022 Linn Quevedo OT GO 1               Linn  Sae, OT  12/21/2022

## 2022-12-21 NOTE — PROGRESS NOTES
Pt admitted to swing bed on 10/5 for additional therapy in the setting of generalized weakness/debility. Pt not seen or examined today. I have reviewed her VS. I have discussed her care with SONAM Forrester who does not report any acute events or issues that need to be addressed. She does report that patient caught her left foot on BSC today. Obtained x-ray of left foot with no evidence of fractures or dislocations. BP stable today. Cont current antihypertensive regimen. Cont Eliquis for treatment of DVT. Cont PT/OT. Updated clinicals have been submitted today. Cont supportive treatment.       Mustapha Loredo,   12/21/22  15:52 EDT

## 2022-12-21 NOTE — CASE MANAGEMENT/SOCIAL WORK
Discharge Planning Assessment   [facility]     Patient Name: Tre Kinney  MRN: [id number]  Today's Date: 12/21/2022    Admit Date: 10/5/2022       Discharge Plan     Row Name 12/21/22 1620       Plan    Plan SS spoke to swing bed RN who submitted updated clinical requesting additional swing bed days on this date. SS spoke to pt who voices she is not physically ready to be discharged home. SS to follow.                XANDER Wills

## 2022-12-21 NOTE — NURSING NOTE
Assisted patient to [facility] from chair with rolling walker.  Patient's 3rd toe left foot was caught under the leg of the BSC. Notified MD and son.  X-rays left foot/toes 2 views were done.  X-rays were negative for fracture or dislocation.  She walked back to the chair.  Notified son that x-rays were negative.

## 2022-12-21 NOTE — PLAN OF CARE
Goal Outcome Evaluation:         The patient has rested overnight. She is currently resting in bed. She was able to ambulate to the bedside once overnight and then was too weak once and had small bout of incontinence overnight. She also was able to sit up in the chair in the beginning of the shift. She complains of mild back pain this morning. I attempted to give her PRN medication however she stated she did not want to take anything just yet. I assisted her to change position and prop up with pillows. No other issues this shift. Her oxygen was spot checked and remained above 94%. Will continue with the plan of care.

## 2022-12-22 LAB — ABSOLUTE LUNG FLUID CONTENT: 33 % (ref 20–35)

## 2022-12-22 PROCEDURE — 97110 THERAPEUTIC EXERCISES: CPT

## 2022-12-22 PROCEDURE — 94726 PLETHYSMOGRAPHY LUNG VOLUMES: CPT

## 2022-12-22 PROCEDURE — 97164 PT RE-EVAL EST PLAN CARE: CPT

## 2022-12-22 PROCEDURE — 97530 THERAPEUTIC ACTIVITIES: CPT

## 2022-12-22 PROCEDURE — 97535 SELF CARE MNGMENT TRAINING: CPT

## 2022-12-22 RX ADMIN — FUROSEMIDE 40 MG: 40 TABLET ORAL at 17:02

## 2022-12-22 RX ADMIN — METOPROLOL TARTRATE 25 MG: 25 TABLET, FILM COATED ORAL at 20:34

## 2022-12-22 RX ADMIN — FUROSEMIDE 40 MG: 40 TABLET ORAL at 08:18

## 2022-12-22 RX ADMIN — DOCUSATE SODIUM 100 MG: 100 CAPSULE ORAL at 20:33

## 2022-12-22 RX ADMIN — DICLOFENAC 4 G: 10 GEL TOPICAL at 17:02

## 2022-12-22 RX ADMIN — GABAPENTIN 300 MG: 300 CAPSULE ORAL at 20:33

## 2022-12-22 RX ADMIN — DICLOFENAC 4 G: 10 GEL TOPICAL at 12:12

## 2022-12-22 RX ADMIN — ESCITALOPRAM 10 MG: 10 TABLET, FILM COATED ORAL at 08:19

## 2022-12-22 RX ADMIN — HYDRALAZINE HYDROCHLORIDE 5 MG: 25 TABLET, FILM COATED ORAL at 14:09

## 2022-12-22 RX ADMIN — APIXABAN 5 MG: 5 TABLET, FILM COATED ORAL at 08:18

## 2022-12-22 RX ADMIN — PANTOPRAZOLE SODIUM 40 MG: 40 TABLET, DELAYED RELEASE ORAL at 06:46

## 2022-12-22 RX ADMIN — HYDRALAZINE HYDROCHLORIDE 5 MG: 25 TABLET, FILM COATED ORAL at 21:29

## 2022-12-22 RX ADMIN — METOPROLOL TARTRATE 25 MG: 25 TABLET, FILM COATED ORAL at 08:19

## 2022-12-22 RX ADMIN — DICLOFENAC 4 G: 10 GEL TOPICAL at 20:34

## 2022-12-22 RX ADMIN — DICLOFENAC 4 G: 10 GEL TOPICAL at 08:19

## 2022-12-22 RX ADMIN — TAMSULOSIN HYDROCHLORIDE 0.4 MG: 0.4 CAPSULE ORAL at 08:18

## 2022-12-22 RX ADMIN — HYDRALAZINE HYDROCHLORIDE 5 MG: 25 TABLET, FILM COATED ORAL at 06:46

## 2022-12-22 RX ADMIN — MAGNESIUM GLUCONATE 500 MG ORAL TABLET 400 MG: 500 TABLET ORAL at 08:18

## 2022-12-22 RX ADMIN — APIXABAN 5 MG: 5 TABLET, FILM COATED ORAL at 20:33

## 2022-12-22 RX ADMIN — Medication 10 MG: at 20:33

## 2022-12-22 RX ADMIN — DOCUSATE SODIUM 100 MG: 100 CAPSULE ORAL at 08:17

## 2022-12-22 RX ADMIN — Medication 1 TABLET: at 08:19

## 2022-12-22 NOTE — THERAPY TREATMENT NOTE
Acute Care - Occupational Therapy Treatment Note   [facility]     Patient Name: Tre Kinney  : 1932  MRN: [id number]  Today's Date: 2022  Onset of Illness/Injury or Date of Surgery: 10/06/22     Referring Physician: Janneth    Admit Date: 10/5/2022     No diagnosis found.  Patient Active Problem List   Diagnosis   • Acid reflux   • Ankle arthralgia   • Cardiac conduction disorder   • Closed fracture of distal fibula   • Arteriosclerosis of coronary artery   • Fracture of distal end of tibia   • Elevated cholesterol   • BP (high blood pressure)   • Hypertrophic polyarthritis   • OP (osteoporosis)   • Right heart failure (HCC)   • Heart failure, chronic, right-sided (HCC)   • SOB (shortness of breath)   • Palpitations   • CHF (congestive heart failure) (HCC)   • Congestive heart failure (HCC)   • Precordial pain   • Chronic anticoagulation   • Hypokalemia   • Anemia, unspecified type   • Generalized weakness     Past Medical History:   Diagnosis Date   • Anemia    • Arthritis    • CHF (congestive heart failure) ([facility])    • Chronic kidney disease    • Coronary artery disease    • H/O blood clots    • History of transfusion     no reaction    • Hypertension      Past Surgical History:   Procedure Laterality Date   • ABDOMINAL SURGERY     • APPENDECTOMY     • CARDIAC SURGERY     •  SECTION     • COLONOSCOPY N/A 3/7/2018    Procedure: COLONOSCOPY;  Surgeon: Willie Gresham MD;  Location:  [facility];  Service:    • CORONARY ANGIOPLASTY WITH STENT PLACEMENT     • ENDOSCOPY N/A 3/6/2018    Procedure: ESOPHAGOGASTRODUODENOSCOPY;  Surgeon: Willie Gresham MD;  Location:  [facility];  Service:    • PACEMAKER IMPLANTATION      left side    • SKIN BIOPSY     • TUBAL ABDOMINAL LIGATION           OT ASSESSMENT FLOWSHEET (last 12 hours)     OT Evaluation and Treatment     Row Name 22 1716                   OT Time and Intention    Document Type therapy note (daily note)  -KR        Mode of  Treatment occupational therapy  -KR        Patient Effort good  -KR        Comment Pt seen on this date for fxl review, ADL assist needs in home environment as pt is able to return to prior living arrangement. Pt is encouraged that she continues to improve but expresses concerns that she is not yet independent with self care activity. OT will continue to encourage as improvement continues, and explore possibility of future training session with designated caregiver/caregivers at time of discharge. UE activity performed at tabletop for fxl feeding activity. Pt continues to perform fxl feeding activity with set up assist only.  -KR           General Information    General Observations of Patient alert/cooperative  -KR           Self-Feeding Assessment/Training    [place] Level (Feeding) feeding skills;liquids to mouth;finger foods;set up  -KR           Motor Skills    Motor Control/Coordination Interventions gross motor coordination activities;fine motor manipulation/dexterity activities  for fxl feeding performance  -KR           Plan of Care Review    Plan of Care Reviewed With patient  -KR        Progress improving  -KR              User Key  (r) = Recorded By, (t) = Taken By, (c) = Cosigned By    Initials Name Effective Dates    KR Andres Uriarte, OT 06/16/21 -                        OT Recommendation and Plan     Progress Toward Functional Goals (OT): progress toward functional goals is gradual  Plan of Care Review  Plan of Care Reviewed With: patient  Progress: improving  Plan of Care Reviewed With: patient     Outcome Measures     Row Name 12/22/22 1725 12/22/22 1345 12/21/22 1500       How much help from another person do you currently need...    Turning from your back to your side while in flat bed without using bedrails? -- 3  -KH 3  -LL    Moving from lying on back to sitting on the side of a flat bed without bedrails? -- 3  -KH 3  -LL    Moving to and from a bed to a chair (including a wheelchair)? --  2  -KH 2  -LL    Standing up from a chair using your arms (e.g., wheelchair, bedside chair)? -- 3  -KH 2  -LL    Climbing 3-5 steps with a railing? -- 2  -KH 2  -LL    To walk in hospital room? -- 2  -KH 2  -LL    AM-PAC 6 Clicks Score (PT) -- 15  -KH 14  -LL       How much help from another is currently needed...    Putting on and taking off regular lower body clothing? 2  -KR -- --    Bathing (including washing, rinsing, and drying) 3  -KR -- --    Toileting (which includes using toilet bed pan or urinal) 3  -KR -- --    Putting on and taking off regular upper body clothing 3  -KR -- --    Taking care of personal grooming (such as brushing teeth) 3  -KR -- --    Eating meals 4  -KR -- --    AM-PAC 6 Clicks Score (OT) 18  -KR -- --       Functional Assessment    Outcome Measure Options -- AM-PAC 6 Clicks Basic Mobility (PT)  - AM-PAC 6 Clicks Basic Mobility (PT)  -LL    Row Name 12/20/22 1519             How much help from another person do you currently need...    Turning from your back to your side while in flat bed without using bedrails? 3  -LL      Moving from lying on back to sitting on the side of a flat bed without bedrails? 3  -LL      Moving to and from a bed to a chair (including a wheelchair)? 3  -LL      Standing up from a chair using your arms (e.g., wheelchair, bedside chair)? 2  -LL      Climbing 3-5 steps with a railing? 2  -LL      To walk in hospital room? 2  -LL      AM-PAC 6 Clicks Score (PT) 15  -LL            User Key  (r) = Recorded By, (t) = Taken By, (c) = Cosigned By    Initials Name Provider Type    Andres Ayala, OT Occupational Therapist    LL Neda Garcia, PTA Physical Therapist Assistant    Radha Yusuf, PT Physical Therapist                Time Calculation:    Time Calculation- OT     Row Name 12/22/22 2207             Time Calculation- OT    Total Timed Code Minutes- OT 15 minute(s)  -KR            User Key  (r) = Recorded By, (t) = Taken By, (c) = Cosigned By     Initials Name Provider Type    KR Andres Uriarte OT Occupational Therapist              Therapy Charges for Today     Code Description Service Date Service Provider Modifiers Qty    46837180666 HC OT SELF CARE/MGMT/TRAIN EA 15 MIN 12/22/2022 Andres Uriarte OT GO 1               Andres Uriarte OT  12/22/2022

## 2022-12-22 NOTE — PLAN OF CARE
Goal Outcome Evaluation:  Plan of Care Reviewed With: patient        Progress: improving  Outcome Evaluation: Rested comfortably today.  Up in chair several hours.  Tolerated well.

## 2022-12-22 NOTE — THERAPY RE-EVALUATION
Acute Care - Physical Therapy Re-Evaluation and Treatment Note  CODY Ellison     Patient Name: Tre Kinney  : 1932  MRN: [id number]  Today's Date: 2022   Onset of Illness/Injury or Date of Surgery: 10/06/22  Visit Dx:   No diagnosis found.  Patient Active Problem List   Diagnosis   • Acid reflux   • Ankle arthralgia   • Cardiac conduction disorder   • Closed fracture of distal fibula   • Arteriosclerosis of coronary artery   • Fracture of distal end of tibia   • Elevated cholesterol   • BP (high blood pressure)   • Hypertrophic polyarthritis   • OP (osteoporosis)   • Right heart failure (HCC)   • Heart failure, chronic, right-sided (HCC)   • SOB (shortness of breath)   • Palpitations   • CHF (congestive heart failure) (HCC)   • Congestive heart failure (HCC)   • Precordial pain   • Chronic anticoagulation   • Hypokalemia   • Anemia, unspecified type   • Generalized weakness     Past Medical History:   Diagnosis Date   • Anemia    • Arthritis    • CHF (congestive heart failure) (HCC)    • Chronic kidney disease    • Coronary artery disease    • H/O blood clots    • History of transfusion     no reaction    • Hypertension      Past Surgical History:   Procedure Laterality Date   • ABDOMINAL SURGERY     • APPENDECTOMY     • CARDIAC SURGERY     •  SECTION     • COLONOSCOPY N/A 3/7/2018    Procedure: COLONOSCOPY;  Surgeon: Willie Gresham MD;  Location:  [facility];  Service:    • CORONARY ANGIOPLASTY WITH STENT PLACEMENT     • ENDOSCOPY N/A 3/6/2018    Procedure: ESOPHAGOGASTRODUODENOSCOPY;  Surgeon: Willie Gresham MD;  Location:  [facility];  Service:    • PACEMAKER IMPLANTATION      left side    • SKIN BIOPSY     • TUBAL ABDOMINAL LIGATION       PT Assessment (last 12 hours)     PT Evaluation and Treatment     Row Name 22 1345 22 1340       Physical Therapy Time and Intention    Document Type therapy note (daily note)  -KH therapy note (daily note)  -    Mode of Treatment  -- physical therapy  -    Patient Effort -- good  -    Comment -- Pt seen for re-evaluation this date and treatment. Pt worked on LE TE sitting in chair, and worked on toilet<>chair transfers. When asked if PT needed to FaceTime, pt stated therapy didn't have to today.  -    Row Name 12/22/22 1340          Range of Motion Comprehensive    Comment, General Range of Motion A/PROM grossly WFL however hip abd not fully tested  -     Row Name 12/22/22 1340          Strength Comprehensive (MMT)    Comment, General Manual Muscle Testing (MMT) Assessment Gross MMT: hip flexion 3, knee flex 3/5, hip abd 4+ to 5/5, DF/PF 5/5; L knee ext 3, R 3+ or 4  -     Row Name 12/22/22 1340          Sit-Stand Transfer    Sit-Stand [place] (Transfers) contact guard;minimum assist (75% patient effort);moderate assist (50% patient effort)  mostly CGA during treatment  -     Assistive Device (Sit-Stand Transfers) walker, front-wheeled  -     Row Name 12/22/22 1340          Stand-Sit Transfer    Stand-Sit [place] (Transfers) contact guard;verbal cues;minimum assist (75% patient effort)  -     Assistive Device (Stand-Sit Transfers) walker, front-wheeled  -     Row Name 12/22/22 1340          Toilet Transfer    Type (Toilet Transfer) sit-stand;stand-sit;stand pivot/stand step  -     [place] Level (Toilet Transfer) contact guard;minimum assist (75% patient effort);verbal cues  -     Assistive Device (Toilet Transfer) commode, 3-in-1;walker, front-wheeled  -     Row Name 12/22/22 1340          Gait/Stairs (Locomotion)    Comment, (Gait/Stairs) Pt took some steps at bedside with RW  -     Row Name 12/22/22 1340          Motor Skills    Therapeutic Exercise hip  pt worked on hip abd with red theraband & 2 sec hold; seated marches with assist/VC for eccentric control/increase challenge of exercise.  -     Row Name 12/22/22 1340          Plan of Care Review    Outcome Evaluation Pt cooperated well with  therapy with focus on transfers for practice for D/C home.  -     Row Name 12/22/22 1340          Positioning and Restraints    Pre-Treatment Position sitting in chair/recliner  -     Post Treatment Position chair  -KH     In Chair reclined;call light within reach;with nsg  -     Row Name 12/22/22 1340          Physical Therapy Goals    Bed Mobility Goal Selection (PT) bed mobility, PT goal 1  -     Transfer Goal Selection (PT) transfer, PT goal 1  -     Gait Training Goal Selection (PT) gait training, PT goal 1  -     Row Name 12/22/22 1340          Transfer Goal 1 (PT)    Activity/Assistive Device (Transfer Goal 1, PT) sit-to-stand/stand-to-sit;bed-to-chair/chair-to-bed  -     [place] Level/Cues Needed (Transfer Goal 1, PT) minimum assist (75% or more patient effort)  -     Time Frame (Transfer Goal 1, PT) by discharge  -     Progress/Outcome (Transfer Goal 1, PT) goal partially met;good progress toward goal  seen with STS grossly CGA/Ann however required more assist with initial stand (min/modA)  -     Row Name 12/22/22 8024          Gait Training Goal 1 (PT)    Activity/Assistive Device (Gait Training Goal 1, PT) gait (walking locomotion);assistive device use  -     [place] Level (Gait Training Goal 1, PT) minimum assist (75% or more patient effort)  -     Distance (Gait Training Goal 1, PT) 30  -KH     Time Frame (Gait Training Goal 1, PT) by discharge  -     Progress/Outcome (Gait Training Goal 1, PT) continuing progress toward goal;good progress toward goal  -           User Key  (r) = Recorded By, (t) = Taken By, (c) = Cosigned By    Initials Name Provider Type    Radha Yusuf PT Physical Therapist                Physical Therapy Education     Title: PT OT SLP Therapies (Done)     Topic: Physical Therapy (Done)     Point: Mobility training (Done)     Learning Progress Summary           Patient Acceptance, E, VU by CL at 12/21/2022 1303    Acceptance, E, VU by AMILCAR  at 12/20/2022 1644    Acceptance, E, VU by CL at 12/17/2022 1703    Acceptance, E,D, VU,NR by LL at 12/16/2022 1605    Acceptance, E,TB, VU by AC at 12/16/2022 1523    Acceptance, E,TB, VU by AC at 12/15/2022 1618    Acceptance, E,TB, VU by AC at 12/14/2022 1503    Acceptance, E,TB, VU by DL at 12/13/2022 0240    Acceptance, D,E, VU,NR by LL at 12/12/2022 1455    Acceptance, E,TB, VU by RW at 12/9/2022 1451    Acceptance, E,TB, VU by BC at 11/27/2022 1319    Acceptance, E,TB, VU by RM at 11/20/2022 1449    Acceptance, E,TB, VU by RM at 11/18/2022 1021    Acceptance, E,TB, VU by RH at 11/16/2022 1110    Acceptance, E,TB, VU by KK at 11/14/2022 1638    Acceptance, E,TB, VU by KK at 11/13/2022 1648    Acceptance, E,TB, VU by KG at 11/10/2022 1827    Acceptance, E,TB, VU by DM at 11/9/2022 1638    Acceptance, E,TB, VU by KK at 11/8/2022 1754    Acceptance, E,TB, VU by KK at 11/7/2022 1635    Acceptance, E,D, VU,NR by LL at 11/5/2022 1325    Acceptance, E, VU by EB at 11/4/2022 1448    Acceptance, E,TB, VU by KM at 11/4/2022 1101   Family Acceptance, D,E, VU,NR by LL at 12/12/2022 1455                   Point: Home exercise program (Done)     Learning Progress Summary           Patient Acceptance, E, VU by CL at 12/21/2022 1303    Acceptance, E, VU by EB at 12/20/2022 1644    Acceptance, E, VU by CL at 12/17/2022 1703    Acceptance, E,D, VU,NR by LL at 12/16/2022 1605    Acceptance, E,TB, VU by AC at 12/16/2022 1523    Acceptance, E,TB, VU by AC at 12/15/2022 1618    Acceptance, E,TB, VU by AC at 12/14/2022 1503    Acceptance, E,TB, VU by DL at 12/13/2022 0240    Acceptance, D,E, VU,NR by LL at 12/12/2022 1455    Acceptance, E,TB, VU by RW at 12/9/2022 1451    Acceptance, E,TB, VU by BC at 11/27/2022 1319    Acceptance, E,TB, VU by RM at 11/20/2022 1449    Acceptance, E,TB, VU by RM at 11/18/2022 1021    Acceptance, E,TB, VU by RH at 11/16/2022 1110    Acceptance, E,TB, VU by KK at 11/14/2022 1638    Acceptance, E,TB, VU  by KK at 11/13/2022 1648    Acceptance, E,TB, VU by KG at 11/10/2022 1827    Acceptance, E,TB, VU by DM at 11/9/2022 1638    Acceptance, E,TB, VU by KK at 11/8/2022 1754    Acceptance, E,TB, VU by KK at 11/7/2022 1635    Acceptance, E,D, VU,NR by LL at 11/5/2022 1325    Acceptance, E, VU by EB at 11/4/2022 1448    Acceptance, E,TB, VU by KM at 11/4/2022 1101   Family Acceptance, D,E, VU,NR by LL at 12/12/2022 1455                   Point: Body mechanics (Done)     Learning Progress Summary           Patient Acceptance, E, VU by CL at 12/21/2022 1303    Acceptance, E, VU by EB at 12/20/2022 1644    Acceptance, E, VU by CL at 12/17/2022 1703    Acceptance, E,D, VU,NR by LL at 12/16/2022 1605    Acceptance, E,TB, VU by AC at 12/16/2022 1523    Acceptance, E,TB, VU by AC at 12/15/2022 1618    Acceptance, E,TB, VU by AC at 12/14/2022 1503    Acceptance, E,TB, VU by DL at 12/13/2022 0240    Acceptance, D,E, VU,NR by LL at 12/12/2022 1455    Acceptance, E,TB, VU by RW at 12/9/2022 1451    Acceptance, E,TB, VU by BC at 11/27/2022 1319    Acceptance, E,TB, VU by RM at 11/20/2022 1449    Acceptance, E,TB, VU by RM at 11/18/2022 1021    Acceptance, E,TB, VU by RH at 11/16/2022 1110    Acceptance, E,TB, VU by KK at 11/14/2022 1638    Acceptance, E,TB, VU by KK at 11/13/2022 1648    Acceptance, E,TB, VU by KG at 11/10/2022 1827    Acceptance, E,TB, VU by DM at 11/9/2022 1638    Acceptance, E,TB, VU by KK at 11/8/2022 1754    Acceptance, E,TB, VU by KK at 11/7/2022 1635    Acceptance, E,D, VU,NR by LL at 11/5/2022 1325    Acceptance, E, VU by EB at 11/4/2022 1448    Acceptance, E,TB, VU by KM at 11/4/2022 1101   Family Acceptance, D,E, VU,NR by LL at 12/12/2022 1455                   Point: Precautions (Done)     Learning Progress Summary           Patient Acceptance, E, VU by CL at 12/21/2022 1303    Acceptance, E, VU by EB at 12/20/2022 1644    Acceptance, E, VU by CL at 12/17/2022 1703    Acceptance, E,D, VU,NR by LL at  12/16/2022 1605    Acceptance, E,TB, VU by AC at 12/16/2022 1523    Acceptance, E,TB, VU by AC at 12/15/2022 1618    Acceptance, E,TB, VU by AC at 12/14/2022 1503    Acceptance, E,TB, VU by DL at 12/13/2022 0240    Acceptance, D,E, VU,NR by LL at 12/12/2022 1455    Acceptance, E,TB, VU by RW at 12/9/2022 1451    Acceptance, E,TB, VU by BC at 11/27/2022 1319    Acceptance, E,TB, VU by RM at 11/20/2022 1449    Acceptance, E,TB, VU by RM at 11/18/2022 1021    Acceptance, E,TB, VU by RH at 11/16/2022 1110    Acceptance, E,TB, VU by KK at 11/14/2022 1638    Acceptance, E,TB, VU by KK at 11/13/2022 1648    Acceptance, E,TB, VU by KG at 11/10/2022 1827    Acceptance, E,TB, VU by DM at 11/9/2022 1638    Acceptance, E,TB, VU by KK at 11/8/2022 1754    Acceptance, E,TB, VU by KK at 11/7/2022 1635    Acceptance, E,D, VU,NR by LL at 11/5/2022 1325    Acceptance, E, VU by EB at 11/4/2022 1448    Acceptance, E,TB, VU by KM at 11/4/2022 1101   Family Acceptance, D,E, VU,NR by LL at 12/12/2022 1455                               User Key     Initials Effective Dates Name Provider Type Discipline    CL 06/16/21 -  Mitzy Mustafa, RN Registered Nurse Nurse    LL 05/02/16 -  Neda Garcia PTA Physical Therapist Assistant PT    RM 06/14/22 -  Bethany Krause RN Registered Nurse Nurse    DM 06/16/21 -  Moniz, Deborah, RN Registered Nurse Nurse    BC 08/02/22 -  Collett, Brittany, RN Registered Nurse Nurse    AC 10/13/22 -  Jaya Castillo RN Registered Nurse Nurse    KM 05/24/22 -  Mao Vázquez, PT Physical Therapist PT    EB 09/22/22 -  Cedric Oliver, RN Registered Nurse Nurse    KK 06/27/22 -  Chelsi Bolivar, RN Registered Nurse Nurse    DL 03/16/22 -  Francy Garcia, RN Registered Nurse Nurse    KG 09/22/22 -  Verónica Rodriguez, RN Registered Nurse Nurse    RW 09/22/22 -  Bethany Schofield, RN Registered Nurse Nurse    RH 08/03/22 - 12/21/22 Jugn Russell, SONAM [facility] Registered Nurse Nurse              PT Recommendation and  Plan     Outcome Evaluation: Pt cooperated well with therapy with focus on transfers for practice for D/C home.   Outcome Measures     Row Name 12/22/22 1345 12/21/22 1500 12/20/22 1519       How much help from another person do you currently need...    Turning from your back to your side while in flat bed without using bedrails? 3  -KH 3  -LL 3  -LL    Moving from lying on back to sitting on the side of a flat bed without bedrails? 3  -KH 3  -LL 3  -LL    Moving to and from a bed to a chair (including a wheelchair)? 2  -KH 2  -LL 3  -LL    Standing up from a chair using your arms (e.g., wheelchair, bedside chair)? 3  -KH 2  -LL 2  -LL    Climbing 3-5 steps with a railing? 2  -KH 2  -LL 2  -LL    To walk in hospital room? 2  -KH 2  -LL 2  -LL    AM-PAC 6 Clicks Score (PT) 15  -KH 14  -LL 15  -LL       Functional Assessment    Outcome Measure Options AM-PAC 6 Clicks Basic Mobility (PT)  -KH AM-PAC 6 Clicks Basic Mobility (PT)  -LL --          User Key  (r) = Recorded By, (t) = Taken By, (c) = Cosigned By    Initials Name Provider Type    Neda Kyle, NACHO Physical Therapist Assistant    Radha Yusuf PT Physical Therapist                 Time Calculation:    PT Charges     Row Name 12/22/22 1536             Time Calculation    Start Time 1340  -KH      PT Received On 12/22/22  -      PT Goal Re-Cert Due Date 01/05/23  -         Time Calculation- PT    Total Timed Code Minutes- PT 24 minute(s)  -            User Key  (r) = Recorded By, (t) = Taken By, (c) = Cosigned By    Initials Name Provider Type    Radha Yusuf PT Physical Therapist              Therapy Charges for Today     Code Description Service Date Service Provider Modifiers Qty    63196246674  PT THER PROC EA 15 MIN 12/22/2022 Radha Vu, PT GP 1    [id number]  PT THERAPEUTIC ACT EA 15 MIN 12/22/2022 Radha Vu PT GP 1    [id number]  PT RE-EVAL ESTABLISHED PLAN 2 12/22/2022 Radha Vu PT GP 1           PT G-Codes  Outcome Measure Options: AM-PAC 6 Clicks Basic Mobility (PT)  AM-PAC 6 Clicks Score (PT): 15  AM-PAC 6 Clicks Score (OT): 18    Radha Vu, EDEL  12/22/2022

## 2022-12-22 NOTE — PROGRESS NOTES
Pt admitted to swing bed on 10/5 for additional therapy in the setting of generalized weakness/debility. Pt not seen or examined today. I have reviewed her VS. I have discussed her care with SONMA Forrester who does not report any acute events or issues that need to be addressed. She reports pt reported episode of dyspnea this AM. Obtained ReDs vest reading in the setting of CHF which revealed normal reading. BP stable today. Cont current antihypertensive regimen. Cont Eliquis for treatment of DVT. Cont PT/OT. Updated clinicals have been submitted. Cont supportive treatment.       Mustapha Loredo,   12/22/22  15:52 EDT

## 2022-12-22 NOTE — PLAN OF CARE
Goal Outcome Evaluation:      Patient resting comfortably in bed; no complaints noted. No acute changes this shift.

## 2022-12-23 PROCEDURE — 97116 GAIT TRAINING THERAPY: CPT

## 2022-12-23 PROCEDURE — 97110 THERAPEUTIC EXERCISES: CPT

## 2022-12-23 RX ADMIN — HYDRALAZINE HYDROCHLORIDE 5 MG: 25 TABLET, FILM COATED ORAL at 05:28

## 2022-12-23 RX ADMIN — Medication 10 MG: at 20:30

## 2022-12-23 RX ADMIN — METOPROLOL TARTRATE 25 MG: 25 TABLET, FILM COATED ORAL at 08:19

## 2022-12-23 RX ADMIN — ESCITALOPRAM 10 MG: 10 TABLET, FILM COATED ORAL at 08:19

## 2022-12-23 RX ADMIN — APIXABAN 5 MG: 5 TABLET, FILM COATED ORAL at 08:19

## 2022-12-23 RX ADMIN — APIXABAN 5 MG: 5 TABLET, FILM COATED ORAL at 20:30

## 2022-12-23 RX ADMIN — MAGNESIUM GLUCONATE 500 MG ORAL TABLET 400 MG: 500 TABLET ORAL at 08:18

## 2022-12-23 RX ADMIN — FUROSEMIDE 40 MG: 40 TABLET ORAL at 08:19

## 2022-12-23 RX ADMIN — HYDRALAZINE HYDROCHLORIDE 5 MG: 25 TABLET, FILM COATED ORAL at 14:14

## 2022-12-23 RX ADMIN — DOCUSATE SODIUM 100 MG: 100 CAPSULE ORAL at 20:30

## 2022-12-23 RX ADMIN — PANTOPRAZOLE SODIUM 40 MG: 40 TABLET, DELAYED RELEASE ORAL at 05:28

## 2022-12-23 RX ADMIN — DOCUSATE SODIUM 100 MG: 100 CAPSULE ORAL at 08:19

## 2022-12-23 RX ADMIN — DICLOFENAC 4 G: 10 GEL TOPICAL at 20:31

## 2022-12-23 RX ADMIN — DICLOFENAC 4 G: 10 GEL TOPICAL at 08:19

## 2022-12-23 RX ADMIN — METOPROLOL TARTRATE 25 MG: 25 TABLET, FILM COATED ORAL at 20:30

## 2022-12-23 RX ADMIN — GABAPENTIN 300 MG: 300 CAPSULE ORAL at 20:34

## 2022-12-23 RX ADMIN — Medication 1 TABLET: at 08:19

## 2022-12-23 RX ADMIN — DICLOFENAC 4 G: 10 GEL TOPICAL at 17:00

## 2022-12-23 RX ADMIN — TAMSULOSIN HYDROCHLORIDE 0.4 MG: 0.4 CAPSULE ORAL at 08:18

## 2022-12-23 RX ADMIN — HYDRALAZINE HYDROCHLORIDE 5 MG: 25 TABLET, FILM COATED ORAL at 21:16

## 2022-12-23 NOTE — CASE MANAGEMENT/SOCIAL WORK
Discharge Planning Assessment   [place]     Patient Name: Tre Kinney  MRN: [id number]  Today's Date: 12/23/2022    Admit Date: 10/5/2022    Plan: Pt was admitted to swing bed on 10/5/22. Pt's insurance has approved additional swing bed days through 12/28/2022 per swing bed RN. SS to follow.      Discharge Plan     Row Name 12/23/22 0839       Plan    Plan Pt was admitted to swing bed on 10/5/22. Pt's insurance has approved additional swing bed days through 12/28/2022 per swing bed RN. SS to follow.              XANDER Wills

## 2022-12-23 NOTE — PLAN OF CARE
Goal Outcome Evaluation:              Outcome Evaluation: patient has rested well tonight with no complaints. patient has sat in chair for a few hours. no distress noted.

## 2022-12-23 NOTE — THERAPY TREATMENT NOTE
Acute Care - Occupational Therapy Treatment Note   [place]     Patient Name: Tre Kinney  : 1932  MRN: [id number]  Today's Date: 2022  Onset of Illness/Injury or Date of Surgery: 10/06/22     Referring Physician: Janneth    Admit Date: 10/5/2022     No diagnosis found.  Patient Active Problem List   Diagnosis   • Acid reflux   • Ankle arthralgia   • Cardiac conduction disorder   • Closed fracture of distal fibula   • Arteriosclerosis of coronary artery   • Fracture of distal end of tibia   • Elevated cholesterol   • BP (high blood pressure)   • Hypertrophic polyarthritis   • OP (osteoporosis)   • Right heart failure (HCC)   • Heart failure, chronic, right-sided (HCC)   • SOB (shortness of breath)   • Palpitations   • CHF (congestive heart failure) (HCC)   • Congestive heart failure (HCC)   • Precordial pain   • Chronic anticoagulation   • Hypokalemia   • Anemia, unspecified type   • Generalized weakness     Past Medical History:   Diagnosis Date   • Anemia    • Arthritis    • CHF (congestive heart failure) ([facility])    • Chronic kidney disease    • Coronary artery disease    • H/O blood clots    • History of transfusion     no reaction    • Hypertension      Past Surgical History:   Procedure Laterality Date   • ABDOMINAL SURGERY     • APPENDECTOMY     • CARDIAC SURGERY     •  SECTION     • COLONOSCOPY N/A 3/7/2018    Procedure: COLONOSCOPY;  Surgeon: Willie Gresham MD;  Location:  [facility];  Service:    • CORONARY ANGIOPLASTY WITH STENT PLACEMENT     • ENDOSCOPY N/A 3/6/2018    Procedure: ESOPHAGOGASTRODUODENOSCOPY;  Surgeon: Willie Gresham MD;  Location:  [facility];  Service:    • PACEMAKER IMPLANTATION      left side    • SKIN BIOPSY     • TUBAL ABDOMINAL LIGATION           OT ASSESSMENT FLOWSHEET (last 12 hours)     OT Evaluation and Treatment     Row Name 22 1112                   OT Time and Intention    Subjective Information no complaints  -LM        Document Type  therapy note (daily note)  -LM        Mode of Treatment occupational therapy  -LM        Patient Effort good  -LM        Comment Patient seen this date for light bue arom/therex at bedside.  Patient performed all tasks while seated in bedside recliner.  Yellow tband and light bue towel arom therex.  Frequent rest breaks.  Good participation.  Patient is demonstrating progress with overall fxl activity tolerance.  tx session from 10:49-11:14.  -LM           Cognition    Affect/Mental Status (Cognition) WFL  -LM        Orientation Status (Cognition) oriented x 4  -LM           Positioning and Restraints    Post Treatment Position chair  -LM        In Chair call light within reach;encouraged to call for assist  -LM              User Key  (r) = Recorded By, (t) = Taken By, (c) = Cosigned By    Initials Name Effective Dates    LM Татьяна Bah, OT 06/16/21 -                        OT Recommendation and Plan           Outcome Measures     Row Name 12/22/22 1725 12/22/22 1345 12/21/22 1500       How much help from another person do you currently need...    Turning from your back to your side while in flat bed without using bedrails? -- 3  -KH 3  -LL    Moving from lying on back to sitting on the side of a flat bed without bedrails? -- 3  -KH 3  -LL    Moving to and from a bed to a chair (including a wheelchair)? -- 2  -KH 2  -LL    Standing up from a chair using your arms (e.g., wheelchair, bedside chair)? -- 3  -KH 2  -LL    Climbing 3-5 steps with a railing? -- 2  -KH 2  -LL    To walk in hospital room? -- 2  -KH 2  -LL    AM-PAC 6 Clicks Score (PT) -- 15  -KH 14  -LL       How much help from another is currently needed...    Putting on and taking off regular lower body clothing? 2  -KR -- --    Bathing (including washing, rinsing, and drying) 3  -KR -- --    Toileting (which includes using toilet bed pan or urinal) 3  -KR -- --    Putting on and taking off regular upper body clothing 3  -KR -- --    Taking care of  personal grooming (such as brushing teeth) 3  -KR -- --    Eating meals 4  -KR -- --    AM-PAC 6 Clicks Score (OT) 18  -KR -- --       Functional Assessment    Outcome Measure Options -- AM-PAC 6 Clicks Basic Mobility (PT)  - AM-PAC 6 Clicks Basic Mobility (PT)  -    Row Name 12/20/22 1519             How much help from another person do you currently need...    Turning from your back to your side while in flat bed without using bedrails? 3  -LL      Moving from lying on back to sitting on the side of a flat bed without bedrails? 3  -LL      Moving to and from a bed to a chair (including a wheelchair)? 3  -LL      Standing up from a chair using your arms (e.g., wheelchair, bedside chair)? 2  -LL      Climbing 3-5 steps with a railing? 2  -LL      To walk in hospital room? 2  -LL      AM-PAC 6 Clicks Score (PT) 15  -LL            User Key  (r) = Recorded By, (t) = Taken By, (c) = Cosigned By    Initials Name Provider Type    Andres Ayala, OT Occupational Therapist    LL Neda Garcia, PTA Physical Therapist Assistant    KH Radha Vu, PT Physical Therapist                Time Calculation:    Time Calculation- OT     Row Name 12/23/22 1115             Time Calculation- OT    Total Timed Code Minutes- OT 25 minute(s)  -            User Key  (r) = Recorded By, (t) = Taken By, (c) = Cosigned By    Initials Name Provider Type     Татьяна Bah OT Occupational Therapist              Therapy Charges for Today     Code Description Service Date Service Provider Modifiers Qty    [id number]  OT THER PROC EA 15 MIN 12/23/2022 Татьяна Bah OT GO 2               Татьяна Bah OT  12/23/2022

## 2022-12-23 NOTE — CASE MANAGEMENT/SOCIAL WORK
Patient has been approved for additional swing bed days through 12/28/2022 provider and SS notified via secure chat.

## 2022-12-23 NOTE — PROGRESS NOTES
Pt admitted to swing bed on 10/5 for additional therapy in the setting of generalized weakness/debility. Pt not seen or examined today. I have reviewed her VS. I have discussed her care with SONAM Forrester who does not report any acute events or issues that need to be addressed. BP mildly elevated but overall stable today. Cont current antihypertensive regimen. Cont Eliquis for treatment of DVT. Cont PT/OT. Pt has been approved for additional swing bed days through 12/28. Cont supportive treatment.       Mustapha Loredo,   12/23/22  15:52 EDT

## 2022-12-23 NOTE — THERAPY TREATMENT NOTE
Acute Care - Physical Therapy Treatment Note   [place]     Patient Name: Tre Kinney  : 1932  MRN: [id number]  Today's Date: 2022   Onset of Illness/Injury or Date of Surgery: 10/06/22  Visit Dx:   No diagnosis found.  Patient Active Problem List   Diagnosis   • Acid reflux   • Ankle arthralgia   • Cardiac conduction disorder   • Closed fracture of distal fibula   • Arteriosclerosis of coronary artery   • Fracture of distal end of tibia   • Elevated cholesterol   • BP (high blood pressure)   • Hypertrophic polyarthritis   • OP (osteoporosis)   • Right heart failure (HCC)   • Heart failure, chronic, right-sided (HCC)   • SOB (shortness of breath)   • Palpitations   • CHF (congestive heart failure) (HCC)   • Congestive heart failure (HCC)   • Precordial pain   • Chronic anticoagulation   • Hypokalemia   • Anemia, unspecified type   • Generalized weakness     Past Medical History:   Diagnosis Date   • Anemia    • Arthritis    • CHF (congestive heart failure) (HCC)    • Chronic kidney disease    • Coronary artery disease    • H/O blood clots    • History of transfusion     no reaction    • Hypertension      Past Surgical History:   Procedure Laterality Date   • ABDOMINAL SURGERY     • APPENDECTOMY     • CARDIAC SURGERY     •  SECTION     • COLONOSCOPY N/A 3/7/2018    Procedure: COLONOSCOPY;  Surgeon: Willie Gresham MD;  Location:  [facility];  Service:    • CORONARY ANGIOPLASTY WITH STENT PLACEMENT     • ENDOSCOPY N/A 3/6/2018    Procedure: ESOPHAGOGASTRODUODENOSCOPY;  Surgeon: Willie Gresham MD;  Location:  [facility];  Service:    • PACEMAKER IMPLANTATION      left side    • SKIN BIOPSY     • TUBAL ABDOMINAL LIGATION       PT Assessment (last 12 hours)     PT Evaluation and Treatment     Row Name 22 1010          Physical Therapy Time and Intention    Subjective Information no complaints  -LL     Document Type therapy note (daily note)  -LL     Mode of Treatment physical  therapy;individual therapy  -LL     Patient Effort good  -LL     Comment Paatient seen for PT this date 10:10-10:35.  Attempted to facetime sonWillian, with no success.  -LL     Row Name 12/23/22 1010          General Information    Patient Profile Reviewed yes  -LL     Row Name 12/23/22 1010          Cognition    Affect/Mental Status (Cognition) WFL  -LL     Orientation Status (Cognition) oriented x 4  -LL     Follows Commands (Cognition) WFL  -LL     Personal Safety Interventions fall prevention program maintained;gait belt;nonskid shoes/slippers when out of bed;supervised activity  -LL     Row Name 12/23/22 1010          Bed Mobility    Supine-Sit [place] (Bed Mobility) standby assist;verbal cues;nonverbal cues (demo/gesture)  -LL     Assistive Device (Bed Mobility) bed rails;draw sheet  -LL     Row Name 12/23/22 1010          Transfers    Transfers sit-stand transfer;stand-sit transfer  -LL     Row Name 12/23/22 1010          Sit-Stand Transfer    Sit-Stand [place] (Transfers) minimum assist (75% patient effort);verbal cues;moderate assist (50% patient effort)  For proper hand placement  -LL     Assistive Device (Sit-Stand Transfers) walker, front-wheeled  -LL     Row Name 12/23/22 1010          Stand-Sit Transfer    Stand-Sit [place] (Transfers) moderate assist (50% patient effort);minimum assist (75% patient effort)  with verbal cueing to reach back for chair  -LL     Assistive Device (Stand-Sit Transfers) walker, front-wheeled  -LL     Row Name 12/23/22 1010          Gait/Stairs (Locomotion)    [place] Level (Gait) moderate assist (50% patient effort);verbal cues;nonverbal cues (demo/gesture);minimum assist (75% patient effort);1 person assist;1 person to manage equipment  -LL     Assistive Device (Gait) walker, front-wheeled  -LL     Distance in Feet (Gait) 6' bed > chair  -LL     Pattern (Gait) step-to  -LL     Deviations/Abnormal Patterns (Gait) stride length decreased;gait speed  decreased;festinating/shuffling;weight shifting decreased  -LL     Bilateral Gait Deviations weight shift ability decreased;forward flexed posture  -LL     Row Name 12/23/22 1010          Hip (Therapeutic Exercise)    Hip Strengthening (Therapeutic Exercise) bilateral;flexion;extension;aBduction;aDduction;marching while seated;sitting;resistance band;red  -LL     Row Name 12/23/22 1010          Knee (Therapeutic Exercise)    Knee Strengthening (Therapeutic Exercise) bilateral;flexion;extension;marching while seated;LAQ (long arc quad);hamstring curls;sitting;resistance band;red  -LL     Row Name 12/23/22 1010          Ankle (Therapeutic Exercise)    Ankle Strengthening (Therapeutic Exercise) bilateral;dorsiflexion;plantarflexion;sitting  -LL     Row Name 12/23/22 1010          Coping    Observed Emotional State calm;cooperative;pleasant  -LL     Verbalized Emotional State acceptance  -LL     Row Name 12/23/22 1010          Positioning and Restraints    Pre-Treatment Position in bed  -LL     Post Treatment Position chair  -LL     In Chair reclined;notified nsg;call light within reach;encouraged to call for assist;exit alarm on  With tray table in front of her  -LL           User Key  (r) = Recorded By, (t) = Taken By, (c) = Cosigned By    Initials Name Provider Type    LL Neda Garcia PTA Physical Therapist Assistant                Physical Therapy Education     Title: PT OT SLP Therapies (Done)     Topic: Physical Therapy (Done)     Point: Mobility training (Done)     Learning Progress Summary           Patient Acceptance, E, VU by CL at 12/22/2022 1826    Acceptance, E, VU by CL at 12/21/2022 1303    Acceptance, E, VU by EB at 12/20/2022 1644    Acceptance, E, VU by CL at 12/17/2022 1703    Acceptance, E,D, VU,NR by LL at 12/16/2022 1605    Acceptance, E,TB, VU by AC at 12/16/2022 1523    Acceptance, E,TB, VU by AC at 12/15/2022 1618    Acceptance, E,TB, VU by AC at 12/14/2022 1503    Acceptance, E,TB, VU by DL  at 12/13/2022 0240    Acceptance, D,E, VU,NR by LL at 12/12/2022 1455    Acceptance, E,TB, VU by RW at 12/9/2022 1451    Acceptance, E,TB, VU by BC at 11/27/2022 1319    Acceptance, E,TB, VU by RM at 11/20/2022 1449    Acceptance, E,TB, VU by RM at 11/18/2022 1021    Acceptance, E,TB, VU by RH at 11/16/2022 1110    Acceptance, E,TB, VU by KK at 11/14/2022 1638    Acceptance, E,TB, VU by KK at 11/13/2022 1648    Acceptance, E,TB, VU by KG at 11/10/2022 1827    Acceptance, E,TB, VU by DM at 11/9/2022 1638    Acceptance, E,TB, VU by KK at 11/8/2022 1754    Acceptance, E,TB, VU by KK at 11/7/2022 1635    Acceptance, E,D, VU,NR by LL at 11/5/2022 1325    Acceptance, E, VU by EB at 11/4/2022 1448    Acceptance, E,TB, VU by KM at 11/4/2022 1101   Family Acceptance, D,E, VU,NR by LL at 12/12/2022 1455                   Point: Home exercise program (Done)     Learning Progress Summary           Patient Acceptance, E, VU by CL at 12/22/2022 1826    Acceptance, E, VU by CL at 12/21/2022 1303    Acceptance, E, VU by EB at 12/20/2022 1644    Acceptance, E, VU by CL at 12/17/2022 1703    Acceptance, E,D, VU,NR by LL at 12/16/2022 1605    Acceptance, E,TB, VU by AC at 12/16/2022 1523    Acceptance, E,TB, VU by AC at 12/15/2022 1618    Acceptance, E,TB, VU by AC at 12/14/2022 1503    Acceptance, E,TB, VU by DL at 12/13/2022 0240    Acceptance, D,E, VU,NR by LL at 12/12/2022 1455    Acceptance, E,TB, VU by RW at 12/9/2022 1451    Acceptance, E,TB, VU by BC at 11/27/2022 1319    Acceptance, E,TB, VU by RM at 11/20/2022 1449    Acceptance, E,TB, VU by RM at 11/18/2022 1021    Acceptance, E,TB, VU by RH at 11/16/2022 1110    Acceptance, E,TB, VU by KK at 11/14/2022 1638    Acceptance, E,TB, VU by KK at 11/13/2022 1648    Acceptance, E,TB, VU by KG at 11/10/2022 1827    Acceptance, E,TB, VU by DM at 11/9/2022 1638    Acceptance, E,TB, VU by KK at 11/8/2022 1754    Acceptance, E,TB, VU by KK at 11/7/2022 1635    Acceptance, E,D, VU,NR by  LL at 11/5/2022 1325    Acceptance, E, VU by EB at 11/4/2022 1448    Acceptance, E,TB, VU by KM at 11/4/2022 1101   Family Acceptance, D,E, VU,NR by LL at 12/12/2022 1455                   Point: Body mechanics (Done)     Learning Progress Summary           Patient Acceptance, E, VU by CL at 12/22/2022 1826    Acceptance, E, VU by CL at 12/21/2022 1303    Acceptance, E, VU by EB at 12/20/2022 1644    Acceptance, E, VU by CL at 12/17/2022 1703    Acceptance, E,D, VU,NR by LL at 12/16/2022 1605    Acceptance, E,TB, VU by AC at 12/16/2022 1523    Acceptance, E,TB, VU by AC at 12/15/2022 1618    Acceptance, E,TB, VU by AC at 12/14/2022 1503    Acceptance, E,TB, VU by DL at 12/13/2022 0240    Acceptance, D,E, VU,NR by LL at 12/12/2022 1455    Acceptance, E,TB, VU by RW at 12/9/2022 1451    Acceptance, E,TB, VU by BC at 11/27/2022 1319    Acceptance, E,TB, VU by RM at 11/20/2022 1449    Acceptance, E,TB, VU by RM at 11/18/2022 1021    Acceptance, E,TB, VU by RH at 11/16/2022 1110    Acceptance, E,TB, VU by KK at 11/14/2022 1638    Acceptance, E,TB, VU by KK at 11/13/2022 1648    Acceptance, E,TB, VU by KG at 11/10/2022 1827    Acceptance, E,TB, VU by DM at 11/9/2022 1638    Acceptance, E,TB, VU by KK at 11/8/2022 1754    Acceptance, E,TB, VU by KK at 11/7/2022 1635    Acceptance, E,D, VU,NR by LL at 11/5/2022 1325    Acceptance, E, VU by EB at 11/4/2022 1448    Acceptance, E,TB, VU by KM at 11/4/2022 1101   Family Acceptance, D,E, VU,NR by LL at 12/12/2022 1455                   Point: Precautions (Done)     Learning Progress Summary           Patient Acceptance, E, VU by CL at 12/22/2022 1826    Acceptance, E, VU by CL at 12/21/2022 1303    Acceptance, E, VU by EB at 12/20/2022 1644    Acceptance, E, VU by CL at 12/17/2022 1703    Acceptance, E,D, VU,NR by LL at 12/16/2022 1605    Acceptance, E,TB, VU by AC at 12/16/2022 1523    Acceptance, E,TB, VU by AC at 12/15/2022 1618    Acceptance, E,TB, VU by AC at 12/14/2022  1503    Acceptance, E,TB, VU by DL at 12/13/2022 0240    Acceptance, D,E, VU,NR by LL at 12/12/2022 1455    Acceptance, E,TB, VU by RW at 12/9/2022 1451    Acceptance, E,TB, VU by BC at 11/27/2022 1319    Acceptance, E,TB, VU by RM at 11/20/2022 1449    Acceptance, E,TB, VU by RM at 11/18/2022 1021    Acceptance, E,TB, VU by RH at 11/16/2022 1110    Acceptance, E,TB, VU by KK at 11/14/2022 1638    Acceptance, E,TB, VU by KK at 11/13/2022 1648    Acceptance, E,TB, VU by KG at 11/10/2022 1827    Acceptance, E,TB, VU by DM at 11/9/2022 1638    Acceptance, E,TB, VU by KK at 11/8/2022 1754    Acceptance, E,TB, VU by KK at 11/7/2022 1635    Acceptance, E,D, VU,NR by LL at 11/5/2022 1325    Acceptance, E, VU by EB at 11/4/2022 1448    Acceptance, E,TB, VU by KM at 11/4/2022 1101   Family Acceptance, D,E, VU,NR by LL at 12/12/2022 1455                               User Key     Initials Effective Dates Name Provider Type Discipline    CL 06/16/21 -  Mitzy Mustafa, RN Registered Nurse Nurse    LL 05/02/16 -  Neda Garcia, PTA Physical Therapist Assistant PT    RM 06/14/22 -  Bethany Krause, RN Registered Nurse Nurse    DM 06/16/21 -  Moniz, Deborah, RN Registered Nurse Nurse    BC 08/02/22 -  Collett, Brittany, RN Registered Nurse Nurse    MIGUEL 10/13/22 -  Jaya Castillo, SONAM Registered Nurse Nurse    KM 05/24/22 -  Mao Vázquez, PT Physical Therapist PT    EB 09/22/22 -  Cedric Oliver, RN Registered Nurse Nurse    KK 06/27/22 -  Chelsi Bolivar, RN Registered Nurse Nurse    DL 03/16/22 -  Francy Garcia, RN Registered Nurse Nurse    KG 09/22/22 -  Verónica Rodriguez, RN Registered Nurse Nurse    RW 09/22/22 -  Bethany Schofield, RN Registered Nurse Nurse    RH 08/03/22 - 12/21/22 Jung Russell RN [facility] Registered Nurse Nurse              PT Recommendation and Plan         Outcome Measures     Row Name 12/23/22 1300 12/22/22 1725 12/22/22 1345       How much help from another person do you currently need...     Turning from your back to your side while in flat bed without using bedrails? 3  -LL -- 3  -KH    Moving from lying on back to sitting on the side of a flat bed without bedrails? 3  -LL -- 3  -KH    Moving to and from a bed to a chair (including a wheelchair)? 2  -LL -- 2  -KH    Standing up from a chair using your arms (e.g., wheelchair, bedside chair)? 3  -LL -- 3  -KH    Climbing 3-5 steps with a railing? 2  -LL -- 2  -KH    To walk in hospital room? 2  -LL -- 2  -KH    AM-PAC 6 Clicks Score (PT) 15  -LL -- 15  -KH       How much help from another is currently needed...    Putting on and taking off regular lower body clothing? -- 2  -KR --    Bathing (including washing, rinsing, and drying) -- 3  -KR --    Toileting (which includes using toilet bed pan or urinal) -- 3  -KR --    Putting on and taking off regular upper body clothing -- 3  -KR --    Taking care of personal grooming (such as brushing teeth) -- 3  -KR --    Eating meals -- 4  -KR --    AM-PAC 6 Clicks Score (OT) -- 18  -KR --       Functional Assessment    Outcome Measure Options AM-PAC 6 Clicks Basic Mobility (PT)  -LL -- AM-PAC 6 Clicks Basic Mobility (PT)  -    Row Name 12/21/22 1500 12/20/22 8043          How much help from another person do you currently need...    Turning from your back to your side while in flat bed without using bedrails? 3  -LL 3  -LL     Moving from lying on back to sitting on the side of a flat bed without bedrails? 3  -LL 3  -LL     Moving to and from a bed to a chair (including a wheelchair)? 2  -LL 3  -LL     Standing up from a chair using your arms (e.g., wheelchair, bedside chair)? 2  -LL 2  -LL     Climbing 3-5 steps with a railing? 2  -LL 2  -LL     To walk in hospital room? 2  -LL 2  -LL     AM-PAC 6 Clicks Score (PT) 14  -LL 15  -LL        Functional Assessment    Outcome Measure Options AM-PAC 6 Clicks Basic Mobility (PT)  -LL --           User Key  (r) = Recorded By, (t) = Taken By, (c) = Cosigned By     Initials Name Provider Type    Andres Ayala, OT Occupational Therapist    LL Neda Garcia PTA Physical Therapist Assistant     Radha Vu, PT Physical Therapist                 Time Calculation:    PT Charges     Row Name 12/23/22 1343             Time Calculation    Start Time 1010  -LL      Stop Time 1035  -LL      Time Calculation (min) 25 min  -LL      PT Received On 12/23/22  -LL         Time Calculation- PT    Total Timed Code Minutes- PT 25 minute(s)  -LL            User Key  (r) = Recorded By, (t) = Taken By, (c) = Cosigned By    Initials Name Provider Type    LL Neda Garcia PTA Physical Therapist Assistant              Therapy Charges for Today     Code Description Service Date Service Provider Modifiers Qty    77944387617 HC GAIT TRAINING EA 15 MIN 12/23/2022 Neda Garcia PTA GP, CQ 1    64327261189 HC PT THER PROC EA 15 MIN 12/23/2022 Neda Garcia PTA GP, CQ 1          PT G-Codes  Outcome Measure Options: AM-PAC 6 Clicks Basic Mobility (PT)  AM-PAC 6 Clicks Score (PT): 15  AM-PAC 6 Clicks Score (OT): 18    Neda. NACHO Garcia  12/23/2022

## 2022-12-23 NOTE — PLAN OF CARE
Goal Outcome Evaluation:  Plan of Care Reviewed With: patient        Progress: improving  Outcome Evaluation: Patient has done well today.  Has sat in chair most of the shift.  VSS.  Continue current plan of care.

## 2022-12-24 PROCEDURE — 97530 THERAPEUTIC ACTIVITIES: CPT

## 2022-12-24 PROCEDURE — 97116 GAIT TRAINING THERAPY: CPT

## 2022-12-24 RX ADMIN — Medication 1 TABLET: at 08:00

## 2022-12-24 RX ADMIN — HYDRALAZINE HYDROCHLORIDE 5 MG: 25 TABLET, FILM COATED ORAL at 21:19

## 2022-12-24 RX ADMIN — APIXABAN 5 MG: 5 TABLET, FILM COATED ORAL at 21:19

## 2022-12-24 RX ADMIN — METOPROLOL TARTRATE 25 MG: 25 TABLET, FILM COATED ORAL at 21:19

## 2022-12-24 RX ADMIN — GABAPENTIN 300 MG: 300 CAPSULE ORAL at 21:18

## 2022-12-24 RX ADMIN — DICLOFENAC 4 G: 10 GEL TOPICAL at 17:54

## 2022-12-24 RX ADMIN — METOPROLOL TARTRATE 25 MG: 25 TABLET, FILM COATED ORAL at 08:00

## 2022-12-24 RX ADMIN — PANTOPRAZOLE SODIUM 40 MG: 40 TABLET, DELAYED RELEASE ORAL at 05:49

## 2022-12-24 RX ADMIN — FUROSEMIDE 40 MG: 40 TABLET ORAL at 08:00

## 2022-12-24 RX ADMIN — APIXABAN 5 MG: 5 TABLET, FILM COATED ORAL at 08:00

## 2022-12-24 RX ADMIN — DICLOFENAC 4 G: 10 GEL TOPICAL at 08:01

## 2022-12-24 RX ADMIN — FUROSEMIDE 40 MG: 40 TABLET ORAL at 17:54

## 2022-12-24 RX ADMIN — HYDRALAZINE HYDROCHLORIDE 5 MG: 25 TABLET, FILM COATED ORAL at 05:49

## 2022-12-24 RX ADMIN — MAGNESIUM GLUCONATE 500 MG ORAL TABLET 400 MG: 500 TABLET ORAL at 08:00

## 2022-12-24 RX ADMIN — ESCITALOPRAM 10 MG: 10 TABLET, FILM COATED ORAL at 08:00

## 2022-12-24 RX ADMIN — TAMSULOSIN HYDROCHLORIDE 0.4 MG: 0.4 CAPSULE ORAL at 08:00

## 2022-12-24 RX ADMIN — DOCUSATE SODIUM 100 MG: 100 CAPSULE ORAL at 08:00

## 2022-12-24 RX ADMIN — HYDRALAZINE HYDROCHLORIDE 5 MG: 25 TABLET, FILM COATED ORAL at 13:55

## 2022-12-24 RX ADMIN — Medication 10 MG: at 21:19

## 2022-12-24 RX ADMIN — DICLOFENAC 4 G: 10 GEL TOPICAL at 11:16

## 2022-12-24 RX ADMIN — DICLOFENAC 4 G: 10 GEL TOPICAL at 21:00

## 2022-12-24 NOTE — THERAPY TREATMENT NOTE
Acute Care - Occupational Therapy Treatment Note   [place]     Patient Name: Tre Kinney  : 1932  MRN: [id number]  Today's Date: 2022  Onset of Illness/Injury or Date of Surgery: 10/06/22     Referring Physician: Janneth    Admit Date: 10/5/2022     No diagnosis found.  Patient Active Problem List   Diagnosis   • Acid reflux   • Ankle arthralgia   • Cardiac conduction disorder   • Closed fracture of distal fibula   • Arteriosclerosis of coronary artery   • Fracture of distal end of tibia   • Elevated cholesterol   • BP (high blood pressure)   • Hypertrophic polyarthritis   • OP (osteoporosis)   • Right heart failure (HCC)   • Heart failure, chronic, right-sided (HCC)   • SOB (shortness of breath)   • Palpitations   • CHF (congestive heart failure) (HCC)   • Congestive heart failure (HCC)   • Precordial pain   • Chronic anticoagulation   • Hypokalemia   • Anemia, unspecified type   • Generalized weakness     Past Medical History:   Diagnosis Date   • Anemia    • Arthritis    • CHF (congestive heart failure) ([facility])    • Chronic kidney disease    • Coronary artery disease    • H/O blood clots    • History of transfusion     no reaction    • Hypertension      Past Surgical History:   Procedure Laterality Date   • ABDOMINAL SURGERY     • APPENDECTOMY     • CARDIAC SURGERY     •  SECTION     • COLONOSCOPY N/A 3/7/2018    Procedure: COLONOSCOPY;  Surgeon: Willie Gresham MD;  Location:  [facility];  Service:    • CORONARY ANGIOPLASTY WITH STENT PLACEMENT     • ENDOSCOPY N/A 3/6/2018    Procedure: ESOPHAGOGASTRODUODENOSCOPY;  Surgeon: Willie Gresham MD;  Location:  [facility];  Service:    • PACEMAKER IMPLANTATION      left side    • SKIN BIOPSY     • TUBAL ABDOMINAL LIGATION           OT ASSESSMENT FLOWSHEET (last 12 hours)     OT Evaluation and Treatment     Row Name 22 1130                   OT Time and Intention    Subjective Information no complaints  -KH        Document Type  therapy note (daily note)  -KH        Mode of Treatment occupational therapy  -KH        Total Minutes, Occupational Therapy 15  -KH        Patient Effort fair  -KH        Comment Patient requesting to use the bathroom that bathroom this date; refused transition to bedside commode citing urgency; provided with bed pain. Patient required Mod A for management of LE clothing in supine prior to requiring Min A for rolling to left and right sides for placement of bed pan and completion of hygiene tasks, Patient required Max A to kathya clean brief/pants from supine this date. Patient engaged in bilateral rolling for changing of bedding and provision of clean/dry bed chucks this date. Patient engaged in scooting to head of bed for increased upright positioning with Mod A. Patient expressed increased fatigue and deferred additional mobility at this time.Patient positioned for safety and comfort with all needs met at end of session.  -           Cognition    Affect/Mental Status (Cognition) WFL  -        Orientation Status (Cognition) oriented x 4  -KH        Follows Commands (Cognition) WFL  -        Personal Safety Interventions fall prevention program maintained;gait belt;nonskid shoes/slippers when out of bed;supervised activity  -           Lower Body Dressing Assessment/Training    [place] Level (Lower Body Dressing) lower body dressing skills;maximum assist (25% patient effort)  -KH        Position (Lower Body Dressing) supine  -           Toileting Assessment/Training    [place] Level (Toileting) toileting skills;moderate assist (50% patient effort)  -        Assistive Devices (Toileting) bedpan  -KH        Position (Toileting) supine  -        Comment, (Toileting) Patient deferred use of BSC this date  -           Plan of Care Review    Plan of Care Reviewed With patient  -        Progress no change  -KH           Positioning and Restraints    Pre-Treatment Position in bed  -KH        Post  Treatment Position bed  -KH        In Bed notified nsg;supine;encouraged to call for assist;call light within reach;exit alarm on;side rails up x3  all needs met; head of bed elevated  -           Progress Summary (OT)    Progress Toward Functional Goals (OT) progress toward functional goals as expected  -        Daily Progress Summary (OT) Patient tolerated bed level ADL tasks well this date with rest as needed. Continues to be limited by increased fatigue with all tasks.  -              User Key  (r) = Recorded By, (t) = Taken By, (c) = Cosigned By    Initials Name Effective Dates    Melissa Nguyen, OT 04/17/18 -                        OT Recommendation and Plan     Progress Toward Functional Goals (OT): progress toward functional goals as expected  Plan of Care Review  Plan of Care Reviewed With: patient  Progress: no change  Plan of Care Reviewed With: patient     Outcome Measures     Row Name 12/23/22 1300 12/22/22 1725 12/22/22 1345       How much help from another person do you currently need...    Turning from your back to your side while in flat bed without using bedrails? 3  -LL -- 3  -KH    Moving from lying on back to sitting on the side of a flat bed without bedrails? 3  -LL -- 3  -KH    Moving to and from a bed to a chair (including a wheelchair)? 2  -LL -- 2  -KH    Standing up from a chair using your arms (e.g., wheelchair, bedside chair)? 3  -LL -- 3  -KH    Climbing 3-5 steps with a railing? 2  -LL -- 2  -KH    To walk in hospital room? 2  -LL -- 2  -KH    AM-PAC 6 Clicks Score (PT) 15  -LL -- 15  -KH       How much help from another is currently needed...    Putting on and taking off regular lower body clothing? -- 2  -KR --    Bathing (including washing, rinsing, and drying) -- 3  -KR --    Toileting (which includes using toilet bed pan or urinal) -- 3  -KR --    Putting on and taking off regular upper body clothing -- 3  -KR --    Taking care of personal grooming (such as  brushing teeth) -- 3  -KR --    Eating meals -- 4  -KR --    AM-PAC 6 Clicks Score (OT) -- 18  -KR --       Functional Assessment    Outcome Measure Options AM-PAC 6 Clicks Basic Mobility (PT)  - -- -PAC 6 Clicks Basic Mobility (PT)  -    Row Name 12/21/22 1500             How much help from another person do you currently need...    Turning from your back to your side while in flat bed without using bedrails? 3  -LL      Moving from lying on back to sitting on the side of a flat bed without bedrails? 3  -LL      Moving to and from a bed to a chair (including a wheelchair)? 2  -LL      Standing up from a chair using your arms (e.g., wheelchair, bedside chair)? 2  -LL      Climbing 3-5 steps with a railing? 2  -LL      To walk in hospital room? 2  -LL      AM-PAC 6 Clicks Score (PT) 14  -LL         Functional Assessment    Outcome Measure Options AM-PAC 6 Clicks Basic Mobility (PT)  -            User Key  (r) = Recorded By, (t) = Taken By, (c) = Cosigned By    Initials Name Provider Type    Andres Ayala, OT Occupational Therapist    LL Neda Garcia, PTA Physical Therapist Assistant    Radha Yusuf, PT Physical Therapist                Time Calculation:              Melissa Yung, OT  12/24/2022

## 2022-12-24 NOTE — PROGRESS NOTES
Pt admitted to swing bed on 10/5 for additional therapy in the setting of generalized weakness/debility. Pt not seen or examined today. I have reviewed her VS. I have discussed her care with SONAM Sims who does not report any acute events or issues that need to be addressed. She does report episode of mild dizziness this AM. BP mildly elevated but remains overall stable today. Cont current antihypertensive regimen. Cont Eliquis for treatment of DVT. Cont PT/OT. Pt has been approved for additional swing bed days through 12/28. Cont supportive treatment.       Mustapha Loredo,   12/24/22  15:52 EDT

## 2022-12-24 NOTE — THERAPY TREATMENT NOTE
Acute Care - Physical Therapy Treatment Note   Scot     Patient Name: Tre Kinney  : 1932  MRN: [id number]  Today's Date: 2022   Onset of Illness/Injury or Date of Surgery: 10/06/22  Visit Dx:   No diagnosis found.  Patient Active Problem List   Diagnosis   • Acid reflux   • Ankle arthralgia   • Cardiac conduction disorder   • Closed fracture of distal fibula   • Arteriosclerosis of coronary artery   • Fracture of distal end of tibia   • Elevated cholesterol   • BP (high blood pressure)   • Hypertrophic polyarthritis   • OP (osteoporosis)   • Right heart failure (HCC)   • Heart failure, chronic, right-sided (HCC)   • SOB (shortness of breath)   • Palpitations   • CHF (congestive heart failure) (HCC)   • Congestive heart failure (HCC)   • Precordial pain   • Chronic anticoagulation   • Hypokalemia   • Anemia, unspecified type   • Generalized weakness     Past Medical History:   Diagnosis Date   • Anemia    • Arthritis    • CHF (congestive heart failure) (HCC)    • Chronic kidney disease    • Coronary artery disease    • H/O blood clots    • History of transfusion     no reaction    • Hypertension      Past Surgical History:   Procedure Laterality Date   • ABDOMINAL SURGERY     • APPENDECTOMY     • CARDIAC SURGERY     •  SECTION     • COLONOSCOPY N/A 3/7/2018    Procedure: COLONOSCOPY;  Surgeon: Willie Gresham MD;  Location:  [facility];  Service:    • CORONARY ANGIOPLASTY WITH STENT PLACEMENT     • ENDOSCOPY N/A 3/6/2018    Procedure: ESOPHAGOGASTRODUODENOSCOPY;  Surgeon: Willie Gresham MD;  Location:  [facility];  Service:    • PACEMAKER IMPLANTATION      left side    • SKIN BIOPSY     • TUBAL ABDOMINAL LIGATION       PT Assessment (last 12 hours)     PT Evaluation and Treatment     Row Name 22 1336          Physical Therapy Time and Intention    Subjective Information complains of;weakness;fatigue  -HR     Document Type therapy note (daily note)  -HR     Mode of  Treatment physical therapy;individual therapy  -HR     Patient Effort good  -HR     Comment Pt and SONAM Sims in agreement for PT. Attempted to facetime Pts son Willian, but recieved no anwser. Pt walked 5' from Bed to BSC and 3' from BSC to chair with RW min/mod A x 2, required extra cueing to turn all the way around before sitting down. Sitting exercises completed up in chair until Pt tolerance.  -HR     Row Name 12/24/22 1336          General Information    Patient Profile Reviewed yes  -HR     Row Name 12/24/22 1336          Cognition    Affect/Mental Status (Cognition) WFL  -HR     Orientation Status (Cognition) oriented x 4  -HR     Follows Commands (Cognition) WFL  -HR     Personal Safety Interventions fall prevention program maintained;gait belt;nonskid shoes/slippers when out of bed;supervised activity  -HR     Row Name 12/24/22 1336          Bed Mobility    Supine-Sit Burns (Bed Mobility) standby assist;verbal cues;nonverbal cues (demo/gesture)  -HR     Assistive Device (Bed Mobility) bed rails;draw sheet  -HR     Row Name 12/24/22 1336          Transfers    Transfers sit-stand transfer;stand-sit transfer  -HR     Row Name 12/24/22 1336          Sit-Stand Transfer    Sit-Stand Burns (Transfers) minimum assist (75% patient effort);verbal cues;moderate assist (50% patient effort);2 person assist  Pt reports knees not doing well on this date, required extra assistance for standing  -HR     Assistive Device (Sit-Stand Transfers) walker, front-wheeled  -HR     Row Name 12/24/22 1336          Stand-Sit Transfer    Stand-Sit Burns (Transfers) moderate assist (50% patient effort);minimum assist (75% patient effort)  -HR     Assistive Device (Stand-Sit Transfers) walker, front-wheeled  -HR     Row Name 12/24/22 1336          Toilet Transfer    Type (Toilet Transfer) sit-stand;stand-sit;stand pivot/stand step  -HR     Burns Level (Toilet Transfer) minimum assist (75% patient effort);moderate  assist (50% patient effort);2 person assist  -HR     Row Name 12/24/22 1336          Gait/Stairs (Locomotion)    [place] Level (Gait) moderate assist (50% patient effort);verbal cues;nonverbal cues (demo/gesture);minimum assist (75% patient effort);1 person to manage equipment;1 person assist  -HR     Assistive Device (Gait) walker, front-wheeled  -HR     Distance in Feet (Gait) 5' from bed to BSC, 3' from BSC to chair.  -HR     Pattern (Gait) step-to  -HR     Deviations/Abnormal Patterns (Gait) stride length decreased;gait speed decreased;festinating/shuffling;weight shifting decreased  -HR     Bilateral Gait Deviations weight shift ability decreased;forward flexed posture  -HR     Row Name 12/24/22 1336          Motor Skills    Therapeutic Exercise other (see comments)  Sitting: AP, LAQ, March, Pillow sq.  -HR     Row Name 12/24/22 1336          Coping    Observed Emotional State calm;cooperative;pleasant  -HR     Verbalized Emotional State acceptance  -HR     Row Name 12/24/22 1336          Positioning and Restraints    Pre-Treatment Position in bed  -HR     Post Treatment Position chair  -HR     In Chair notified nsg;sitting;call light within reach;encouraged to call for assist  -HR           User Key  (r) = Recorded By, (t) = Taken By, (c) = Cosigned By    Initials Name Provider Type    HR Lindsay Rockwell PTA Physical Therapist Assistant                Physical Therapy Education     Title: PT OT SLP Therapies (Done)     Topic: Physical Therapy (Done)     Point: Mobility training (Done)     Learning Progress Summary           Patient Acceptance, E, VU by CL at 12/22/2022 1826    Acceptance, E, VU by CL at 12/21/2022 1303    Acceptance, E, VU by EB at 12/20/2022 1644    Acceptance, E, VU by CL at 12/17/2022 1703    Acceptance, E,D, VU,NR by LL at 12/16/2022 1605    Acceptance, E,TB, VU by AC at 12/16/2022 1523    Acceptance, E,TB, VU by AC at 12/15/2022 1618    Acceptance, E,TB, VU by AC at 12/14/2022 1503     Acceptance, E,TB, VU by DL at 12/13/2022 0240    Acceptance, D,E, VU,NR by LL at 12/12/2022 1455    Acceptance, E,TB, VU by RW at 12/9/2022 1451    Acceptance, E,TB, VU by BC at 11/27/2022 1319    Acceptance, E,TB, VU by RM at 11/20/2022 1449    Acceptance, E,TB, VU by RM at 11/18/2022 1021    Acceptance, E,TB, VU by RH at 11/16/2022 1110    Acceptance, E,TB, VU by KK at 11/14/2022 1638    Acceptance, E,TB, VU by KK at 11/13/2022 1648    Acceptance, E,TB, VU by KG at 11/10/2022 1827    Acceptance, E,TB, VU by DM at 11/9/2022 1638    Acceptance, E,TB, VU by KK at 11/8/2022 1754    Acceptance, E,TB, VU by KK at 11/7/2022 1635    Acceptance, E,D, VU,NR by LL at 11/5/2022 1325    Acceptance, E, VU by EB at 11/4/2022 1448    Acceptance, E,TB, VU by KM at 11/4/2022 1101   Family Acceptance, D,E, VU,NR by LL at 12/12/2022 1455                   Point: Home exercise program (Done)     Learning Progress Summary           Patient Acceptance, E, VU by CL at 12/22/2022 1826    Acceptance, E, VU by CL at 12/21/2022 1303    Acceptance, E, VU by EB at 12/20/2022 1644    Acceptance, E, VU by CL at 12/17/2022 1703    Acceptance, E,D, VU,NR by LL at 12/16/2022 1605    Acceptance, E,TB, VU by AC at 12/16/2022 1523    Acceptance, E,TB, VU by AC at 12/15/2022 1618    Acceptance, E,TB, VU by AC at 12/14/2022 1503    Acceptance, E,TB, VU by DL at 12/13/2022 0240    Acceptance, D,E, VU,NR by LL at 12/12/2022 1455    Acceptance, E,TB, VU by RW at 12/9/2022 1451    Acceptance, E,TB, VU by BC at 11/27/2022 1319    Acceptance, E,TB, VU by RM at 11/20/2022 1449    Acceptance, E,TB, VU by RM at 11/18/2022 1021    Acceptance, E,TB, VU by RH at 11/16/2022 1110    Acceptance, E,TB, VU by KK at 11/14/2022 1638    Acceptance, E,TB, VU by KK at 11/13/2022 1648    Acceptance, E,TB, VU by KG at 11/10/2022 1827    Acceptance, E,TB, VU by DM at 11/9/2022 1638    Acceptance, E,TB, VU by KK at 11/8/2022 1754    Acceptance, E,TB, VU by KK at 11/7/2022 1635     Acceptance, E,D, VU,NR by LL at 11/5/2022 1325    Acceptance, E, VU by EB at 11/4/2022 1448    Acceptance, E,TB, VU by KM at 11/4/2022 1101   Family Acceptance, D,E, VU,NR by LL at 12/12/2022 1455                   Point: Body mechanics (Done)     Learning Progress Summary           Patient Acceptance, E, VU by CL at 12/22/2022 1826    Acceptance, E, VU by CL at 12/21/2022 1303    Acceptance, E, VU by EB at 12/20/2022 1644    Acceptance, E, VU by CL at 12/17/2022 1703    Acceptance, E,D, VU,NR by LL at 12/16/2022 1605    Acceptance, E,TB, VU by AC at 12/16/2022 1523    Acceptance, E,TB, VU by AC at 12/15/2022 1618    Acceptance, E,TB, VU by AC at 12/14/2022 1503    Acceptance, E,TB, VU by DL at 12/13/2022 0240    Acceptance, D,E, VU,NR by LL at 12/12/2022 1455    Acceptance, E,TB, VU by RW at 12/9/2022 1451    Acceptance, E,TB, VU by BC at 11/27/2022 1319    Acceptance, E,TB, VU by RM at 11/20/2022 1449    Acceptance, E,TB, VU by RM at 11/18/2022 1021    Acceptance, E,TB, VU by RH at 11/16/2022 1110    Acceptance, E,TB, VU by KK at 11/14/2022 1638    Acceptance, E,TB, VU by KK at 11/13/2022 1648    Acceptance, E,TB, VU by KG at 11/10/2022 1827    Acceptance, E,TB, VU by DM at 11/9/2022 1638    Acceptance, E,TB, VU by KK at 11/8/2022 1754    Acceptance, E,TB, VU by KK at 11/7/2022 1635    Acceptance, E,D, VU,NR by LL at 11/5/2022 1325    Acceptance, E, VU by EB at 11/4/2022 1448    Acceptance, E,TB, VU by KM at 11/4/2022 1101   Family Acceptance, D,E, VU,NR by LL at 12/12/2022 1455                   Point: Precautions (Done)     Learning Progress Summary           Patient Acceptance, E, VU by CL at 12/22/2022 1826    Acceptance, E, VU by CL at 12/21/2022 1303    Acceptance, E, VU by EB at 12/20/2022 1644    Acceptance, E, VU by CL at 12/17/2022 1703    Acceptance, E,D, VU,NR by LL at 12/16/2022 1605    Acceptance, E,TB, VU by AC at 12/16/2022 1523    Acceptance, E,TB, VU by AC at 12/15/2022 1618    Acceptance,  E,TB, VU by AC at 12/14/2022 1503    Acceptance, E,TB, VU by DL at 12/13/2022 0240    Acceptance, D,E, VU,NR by LL at 12/12/2022 1455    Acceptance, E,TB, VU by RW at 12/9/2022 1451    Acceptance, E,TB, VU by BC at 11/27/2022 1319    Acceptance, E,TB, VU by RM at 11/20/2022 1449    Acceptance, E,TB, VU by RM at 11/18/2022 1021    Acceptance, E,TB, VU by RH at 11/16/2022 1110    Acceptance, E,TB, VU by KK at 11/14/2022 1638    Acceptance, E,TB, VU by KK at 11/13/2022 1648    Acceptance, E,TB, VU by KG at 11/10/2022 1827    Acceptance, E,TB, VU by DM at 11/9/2022 1638    Acceptance, E,TB, VU by KK at 11/8/2022 1754    Acceptance, E,TB, VU by KK at 11/7/2022 1635    Acceptance, E,D, VU,NR by LL at 11/5/2022 1325    Acceptance, E, VU by EB at 11/4/2022 1448    Acceptance, E,TB, VU by KM at 11/4/2022 1101   Family Acceptance, D,E, VU,NR by LL at 12/12/2022 1455                               User Key     Initials Effective Dates Name Provider Type Discipline    CL 06/16/21 -  Mitzy Mustafa, RN Registered Nurse Nurse    LL 05/02/16 -  Neda Garcia, PTA Physical Therapist Assistant PT    RM 06/14/22 -  Bethany Krause RN Registered Nurse Nurse    DM 06/16/21 -  Moniz, Deborah, RN Registered Nurse Nurse    BC 08/02/22 -  Collett, Brittany, RN Registered Nurse Nurse    AC 10/13/22 -  Jaya Castillo, RN Registered Nurse Nurse    KM 05/24/22 -  Mao Vázquez, PT Physical Therapist PT    EB 09/22/22 -  Cedric Oliver, RN Registered Nurse Nurse    KK 06/27/22 -  Chelsi Bolivar, RN Registered Nurse Nurse    DL 03/16/22 -  Francy Garcia, RN Registered Nurse Nurse    KG 09/22/22 -  Verónica Rodriguez, RN Registered Nurse Nurse    RW 09/22/22 -  Bethany Schofield, RN Registered Nurse Nurse    RH 08/03/22 - 12/21/22 Jung Russell, SONAM [facility] Registered Nurse Nurse              PT Recommendation and Plan         Outcome Measures     Row Name 12/23/22 1300 12/22/22 1725 12/22/22 1345       How much help from another person do  you currently need...    Turning from your back to your side while in flat bed without using bedrails? 3  -LL -- 3  -KH    Moving from lying on back to sitting on the side of a flat bed without bedrails? 3  -LL -- 3  -KH    Moving to and from a bed to a chair (including a wheelchair)? 2  -LL -- 2  -KH    Standing up from a chair using your arms (e.g., wheelchair, bedside chair)? 3  -LL -- 3  -KH    Climbing 3-5 steps with a railing? 2  -LL -- 2  -KH    To walk in hospital room? 2  -LL -- 2  -KH    AM-PAC 6 Clicks Score (PT) 15  -LL -- 15  -KH       How much help from another is currently needed...    Putting on and taking off regular lower body clothing? -- 2  -KR --    Bathing (including washing, rinsing, and drying) -- 3  -KR --    Toileting (which includes using toilet bed pan or urinal) -- 3  -KR --    Putting on and taking off regular upper body clothing -- 3  -KR --    Taking care of personal grooming (such as brushing teeth) -- 3  -KR --    Eating meals -- 4  -KR --    AM-PAC 6 Clicks Score (OT) -- 18  -KR --       Functional Assessment    Outcome Measure Options AM-PAC 6 Clicks Basic Mobility (PT)  -LL -- AM-PAC 6 Clicks Basic Mobility (PT)  -    Row Name 12/21/22 1500             How much help from another person do you currently need...    Turning from your back to your side while in flat bed without using bedrails? 3  -LL      Moving from lying on back to sitting on the side of a flat bed without bedrails? 3  -LL      Moving to and from a bed to a chair (including a wheelchair)? 2  -LL      Standing up from a chair using your arms (e.g., wheelchair, bedside chair)? 2  -LL      Climbing 3-5 steps with a railing? 2  -LL      To walk in hospital room? 2  -LL      AM-PAC 6 Clicks Score (PT) 14  -LL         Functional Assessment    Outcome Measure Options AM-PAC 6 Clicks Basic Mobility (PT)  -LL            User Key  (r) = Recorded By, (t) = Taken By, (c) = Cosigned By    Initials Name Provider Type    KR  Andres Uriarte, OT Occupational Therapist    LL Neda Garcia, NACHO Physical Therapist Assistant    Radha Yusuf, PT Physical Therapist                 Time Calculation:    PT Charges     Row Name 12/24/22 1343             Time Calculation    Start Time 1309  -HR      PT Received On 12/24/22  -HR         Time Calculation- PT    Total Timed Code Minutes- PT 23 minute(s)  -HR            User Key  (r) = Recorded By, (t) = Taken By, (c) = Cosigned By    Initials Name Provider Type    HR Lindsay Rockwell PTA Physical Therapist Assistant              Therapy Charges for Today     Code Description Service Date Service Provider Modifiers Qty    72014178553 HC GAIT TRAINING EA 15 MIN 12/24/2022 Lindsay Rockwell PTA GP, CQ 1    41268947209 HC PT THERAPEUTIC ACT EA 15 MIN 12/24/2022 Lindsay Rockwell PTA GP, CQ 1          PT G-Codes  Outcome Measure Options: AM-PAC 6 Clicks Basic Mobility (PT)  AM-PAC 6 Clicks Score (PT): 15  AM-PAC 6 Clicks Score (OT): 18    Lindsay Rockwell PTA  12/24/2022

## 2022-12-24 NOTE — PLAN OF CARE
Goal Outcome Evaluation:  Plan of Care Reviewed With: patient           Outcome Evaluation: Pt sitting up in chair at this time. No complaints of pain this shift. Pt worked with PT this shift and tolerated well. Will continue with plan of care.

## 2022-12-25 RX ADMIN — DOCUSATE SODIUM 100 MG: 100 CAPSULE ORAL at 21:05

## 2022-12-25 RX ADMIN — MAGNESIUM GLUCONATE 500 MG ORAL TABLET 400 MG: 500 TABLET ORAL at 08:23

## 2022-12-25 RX ADMIN — FUROSEMIDE 40 MG: 40 TABLET ORAL at 08:22

## 2022-12-25 RX ADMIN — DICLOFENAC 4 G: 10 GEL TOPICAL at 11:30

## 2022-12-25 RX ADMIN — Medication 1 TABLET: at 08:23

## 2022-12-25 RX ADMIN — HYDRALAZINE HYDROCHLORIDE 5 MG: 25 TABLET, FILM COATED ORAL at 21:04

## 2022-12-25 RX ADMIN — ESCITALOPRAM 10 MG: 10 TABLET, FILM COATED ORAL at 08:23

## 2022-12-25 RX ADMIN — TAMSULOSIN HYDROCHLORIDE 0.4 MG: 0.4 CAPSULE ORAL at 08:23

## 2022-12-25 RX ADMIN — METOPROLOL TARTRATE 25 MG: 25 TABLET, FILM COATED ORAL at 08:23

## 2022-12-25 RX ADMIN — FUROSEMIDE 40 MG: 40 TABLET ORAL at 17:42

## 2022-12-25 RX ADMIN — METOPROLOL TARTRATE 25 MG: 25 TABLET, FILM COATED ORAL at 21:04

## 2022-12-25 RX ADMIN — GABAPENTIN 300 MG: 300 CAPSULE ORAL at 21:04

## 2022-12-25 RX ADMIN — DICLOFENAC 4 G: 10 GEL TOPICAL at 21:04

## 2022-12-25 RX ADMIN — DICLOFENAC 4 G: 10 GEL TOPICAL at 08:25

## 2022-12-25 RX ADMIN — PANTOPRAZOLE SODIUM 40 MG: 40 TABLET, DELAYED RELEASE ORAL at 06:27

## 2022-12-25 RX ADMIN — APIXABAN 5 MG: 5 TABLET, FILM COATED ORAL at 08:23

## 2022-12-25 RX ADMIN — HYDRALAZINE HYDROCHLORIDE 5 MG: 25 TABLET, FILM COATED ORAL at 06:27

## 2022-12-25 RX ADMIN — HYDRALAZINE HYDROCHLORIDE 5 MG: 25 TABLET, FILM COATED ORAL at 14:17

## 2022-12-25 RX ADMIN — Medication 10 MG: at 21:04

## 2022-12-25 RX ADMIN — DICLOFENAC 4 G: 10 GEL TOPICAL at 17:42

## 2022-12-25 RX ADMIN — APIXABAN 5 MG: 5 TABLET, FILM COATED ORAL at 21:04

## 2022-12-25 RX ADMIN — DOCUSATE SODIUM 100 MG: 100 CAPSULE ORAL at 08:23

## 2022-12-25 NOTE — PROGRESS NOTES
Pt admitted to swing bed on 10/5 for additional therapy in the setting of generalized weakness/debility. Pt briefly seen to wish her a Bev Estrada but not examined today. I have reviewed her VS. I have discussed her care with SONAM Kurtz who does not report any acute events or issues that need to be addressed. Pt states she feels tired today but no specific complaints reported. BP has fluctuated today. Cont current antihypertensive regimen. Cont Eliquis for treatment of DVT. Cont PT/OT. Repeat labs in the AM. Pt has been approved for additional swing bed days through 12/28. Cont supportive treatment.       Mustapha Loredo, DO  12/25/22  15:52 EDT

## 2022-12-25 NOTE — PLAN OF CARE
Goal Outcome Evaluation:  Plan of Care Reviewed With: patient        Progress: no change  Outcome Evaluation: Pt resting, sitting up in chair, no changes, will continue to monitor

## 2022-12-25 NOTE — PLAN OF CARE
Goal Outcome Evaluation:  Plan of Care Reviewed With: patient           Outcome Evaluation: Pt resting in bed at this time comfortably. No acute changes this shift. VSS. Will continue with plan of care.

## 2022-12-26 LAB
ALBUMIN SERPL-MCNC: 3.04 G/DL (ref 3.5–5.2)
ALBUMIN/GLOB SERPL: 1.1 G/DL
ALP SERPL-CCNC: 53 U/L (ref 39–117)
ALT SERPL W P-5'-P-CCNC: <5 U/L (ref 1–33)
ANION GAP SERPL CALCULATED.3IONS-SCNC: 12.1 MMOL/L (ref 5–15)
AST SERPL-CCNC: 6 U/L (ref 1–32)
BASOPHILS # BLD AUTO: 0.05 10*3/MM3 (ref 0–0.2)
BASOPHILS NFR BLD AUTO: 0.9 % (ref 0–1.5)
BILIRUB SERPL-MCNC: 0.4 MG/DL (ref 0–1.2)
BUN SERPL-MCNC: 26 MG/DL (ref 8–23)
BUN/CREAT SERPL: 13.5 (ref 7–25)
CALCIUM SPEC-SCNC: 9 MG/DL (ref 8.2–9.6)
CHLORIDE SERPL-SCNC: 101 MMOL/L (ref 98–107)
CO2 SERPL-SCNC: 25.9 MMOL/L (ref 22–29)
CREAT SERPL-MCNC: 1.92 MG/DL (ref 0.57–1)
DEPRECATED RDW RBC AUTO: 49.4 FL (ref 37–54)
EGFRCR SERPLBLD CKD-EPI 2021: 24.5 ML/MIN/1.73
EOSINOPHIL # BLD AUTO: 0.41 10*3/MM3 (ref 0–0.4)
EOSINOPHIL NFR BLD AUTO: 7 % (ref 0.3–6.2)
ERYTHROCYTE [DISTWIDTH] IN BLOOD BY AUTOMATED COUNT: 14.6 % (ref 12.3–15.4)
GLOBULIN UR ELPH-MCNC: 2.9 GM/DL
GLUCOSE SERPL-MCNC: 109 MG/DL (ref 65–99)
HCT VFR BLD AUTO: 31.7 % (ref 34–46.6)
HGB BLD-MCNC: 10 G/DL (ref 12–15.9)
IMM GRANULOCYTES # BLD AUTO: 0.02 10*3/MM3 (ref 0–0.05)
IMM GRANULOCYTES NFR BLD AUTO: 0.3 % (ref 0–0.5)
LYMPHOCYTES # BLD AUTO: 1.18 10*3/MM3 (ref 0.7–3.1)
LYMPHOCYTES NFR BLD AUTO: 20.2 % (ref 19.6–45.3)
MCH RBC QN AUTO: 29.4 PG (ref 26.6–33)
MCHC RBC AUTO-ENTMCNC: 31.5 G/DL (ref 31.5–35.7)
MCV RBC AUTO: 93.2 FL (ref 79–97)
MONOCYTES # BLD AUTO: 0.58 10*3/MM3 (ref 0.1–0.9)
MONOCYTES NFR BLD AUTO: 9.9 % (ref 5–12)
NEUTROPHILS NFR BLD AUTO: 3.61 10*3/MM3 (ref 1.7–7)
NEUTROPHILS NFR BLD AUTO: 61.7 % (ref 42.7–76)
NRBC BLD AUTO-RTO: 0 /100 WBC (ref 0–0.2)
PLATELET # BLD AUTO: 201 10*3/MM3 (ref 140–450)
PMV BLD AUTO: 10 FL (ref 6–12)
POTASSIUM SERPL-SCNC: 3.8 MMOL/L (ref 3.5–5.2)
PROT SERPL-MCNC: 5.9 G/DL (ref 6–8.5)
RBC # BLD AUTO: 3.4 10*6/MM3 (ref 3.77–5.28)
SODIUM SERPL-SCNC: 139 MMOL/L (ref 136–145)
WBC NRBC COR # BLD: 5.85 10*3/MM3 (ref 3.4–10.8)

## 2022-12-26 PROCEDURE — 97116 GAIT TRAINING THERAPY: CPT

## 2022-12-26 PROCEDURE — 97110 THERAPEUTIC EXERCISES: CPT

## 2022-12-26 PROCEDURE — 85025 COMPLETE CBC W/AUTO DIFF WBC: CPT | Performed by: INTERNAL MEDICINE

## 2022-12-26 PROCEDURE — 80053 COMPREHEN METABOLIC PANEL: CPT | Performed by: INTERNAL MEDICINE

## 2022-12-26 RX ORDER — DIPHENHYDRAMINE HYDROCHLORIDE 50 MG/ML
25 INJECTION INTRAMUSCULAR; INTRAVENOUS ONCE
Status: DISCONTINUED | OUTPATIENT
Start: 2022-12-26 | End: 2022-12-26

## 2022-12-26 RX ADMIN — GABAPENTIN 300 MG: 300 CAPSULE ORAL at 21:53

## 2022-12-26 RX ADMIN — DICLOFENAC 4 G: 10 GEL TOPICAL at 11:10

## 2022-12-26 RX ADMIN — DICLOFENAC 4 G: 10 GEL TOPICAL at 08:09

## 2022-12-26 RX ADMIN — METOPROLOL TARTRATE 25 MG: 25 TABLET, FILM COATED ORAL at 21:53

## 2022-12-26 RX ADMIN — APIXABAN 5 MG: 5 TABLET, FILM COATED ORAL at 21:53

## 2022-12-26 RX ADMIN — PANTOPRAZOLE SODIUM 40 MG: 40 TABLET, DELAYED RELEASE ORAL at 05:49

## 2022-12-26 RX ADMIN — ESCITALOPRAM 10 MG: 10 TABLET, FILM COATED ORAL at 08:05

## 2022-12-26 RX ADMIN — APIXABAN 5 MG: 5 TABLET, FILM COATED ORAL at 08:05

## 2022-12-26 RX ADMIN — HYDRALAZINE HYDROCHLORIDE 5 MG: 25 TABLET, FILM COATED ORAL at 05:49

## 2022-12-26 RX ADMIN — FUROSEMIDE 40 MG: 40 TABLET ORAL at 08:05

## 2022-12-26 RX ADMIN — DICLOFENAC 4 G: 10 GEL TOPICAL at 18:36

## 2022-12-26 RX ADMIN — METOPROLOL TARTRATE 25 MG: 25 TABLET, FILM COATED ORAL at 08:07

## 2022-12-26 RX ADMIN — Medication 1 TABLET: at 08:06

## 2022-12-26 RX ADMIN — HYDRALAZINE HYDROCHLORIDE 5 MG: 25 TABLET, FILM COATED ORAL at 21:53

## 2022-12-26 RX ADMIN — HYDRALAZINE HYDROCHLORIDE 5 MG: 25 TABLET, FILM COATED ORAL at 14:26

## 2022-12-26 RX ADMIN — TAMSULOSIN HYDROCHLORIDE 0.4 MG: 0.4 CAPSULE ORAL at 08:06

## 2022-12-26 RX ADMIN — DOCUSATE SODIUM 100 MG: 100 CAPSULE ORAL at 08:08

## 2022-12-26 RX ADMIN — DOCUSATE SODIUM 100 MG: 100 CAPSULE ORAL at 21:53

## 2022-12-26 RX ADMIN — FUROSEMIDE 40 MG: 40 TABLET ORAL at 18:36

## 2022-12-26 RX ADMIN — ACETAMINOPHEN 650 MG: 325 TABLET, FILM COATED ORAL at 08:14

## 2022-12-26 RX ADMIN — DICLOFENAC 4 G: 10 GEL TOPICAL at 21:52

## 2022-12-26 RX ADMIN — Medication 10 MG: at 21:52

## 2022-12-26 RX ADMIN — MAGNESIUM GLUCONATE 500 MG ORAL TABLET 400 MG: 500 TABLET ORAL at 08:06

## 2022-12-26 NOTE — PLAN OF CARE
Goal Outcome Evaluation:  Plan of Care Reviewed With: patient        Progress: improving  Outcome Evaluation: Up in chair several hours.  Worked well with PT/OT. Tolerated well.

## 2022-12-26 NOTE — THERAPY TREATMENT NOTE
Acute Care - Occupational Therapy Treatment Note   Scot     Patient Name: Tre Kinney  : 1932  MRN: [id number]  Today's Date: 2022  Onset of Illness/Injury or Date of Surgery: 10/06/22     Referring Physician: Janneth    Admit Date: 10/5/2022     No diagnosis found.  Patient Active Problem List   Diagnosis   • Acid reflux   • Ankle arthralgia   • Cardiac conduction disorder   • Closed fracture of distal fibula   • Arteriosclerosis of coronary artery   • Fracture of distal end of tibia   • Elevated cholesterol   • BP (high blood pressure)   • Hypertrophic polyarthritis   • OP (osteoporosis)   • Right heart failure (HCC)   • Heart failure, chronic, right-sided (HCC)   • SOB (shortness of breath)   • Palpitations   • CHF (congestive heart failure) (HCC)   • Congestive heart failure (HCC)   • Precordial pain   • Chronic anticoagulation   • Hypokalemia   • Anemia, unspecified type   • Generalized weakness     Past Medical History:   Diagnosis Date   • Anemia    • Arthritis    • CHF (congestive heart failure) ([facility])    • Chronic kidney disease    • Coronary artery disease    • H/O blood clots    • History of transfusion     no reaction    • Hypertension      Past Surgical History:   Procedure Laterality Date   • ABDOMINAL SURGERY     • APPENDECTOMY     • CARDIAC SURGERY     •  SECTION     • COLONOSCOPY N/A 3/7/2018    Procedure: COLONOSCOPY;  Surgeon: Willie Gresham MD;  Location:  [facility];  Service:    • CORONARY ANGIOPLASTY WITH STENT PLACEMENT     • ENDOSCOPY N/A 3/6/2018    Procedure: ESOPHAGOGASTRODUODENOSCOPY;  Surgeon: Willie Gresham MD;  Location:  [facility];  Service:    • PACEMAKER IMPLANTATION      left side    • SKIN BIOPSY     • TUBAL ABDOMINAL LIGATION           OT ASSESSMENT FLOWSHEET (last 12 hours)     OT Evaluation and Treatment     Row Name 22 1507                   OT Time and Intention    Subjective Information no complaints  -LM        Document Type  therapy note (daily note)  -LM        Mode of Treatment occupational therapy  -LM        Patient Effort good  -LM        Comment Patient seen this date for light bue therex/TA from [id number].  Patient performed light bue strengthening with yellow tband and light towel therex.  Frequent rest breaks, good participation.  Performed in bedside recliner.  -LM           General Information    Existing Precautions/Restrictions fall  -LM           Cognition    Affect/Mental Status (Cognition) WFL  -LM        Orientation Status (Cognition) oriented x 4  -LM           Positioning and Restraints    Post Treatment Position chair  -LM        In Chair encouraged to call for assist;call light within reach  -LM              User Key  (r) = Recorded By, (t) = Taken By, (c) = Cosigned By    Initials Name Effective Dates    Татьяна Mccabe, OT 06/16/21 -                        OT Recommendation and Plan           Outcome Measures     Row Name 12/26/22 1500             How much help from another is currently needed...    Putting on and taking off regular lower body clothing? 2  -LM      Bathing (including washing, rinsing, and drying) 3  -LM      Toileting (which includes using toilet bed pan or urinal) 3  -LM      Putting on and taking off regular upper body clothing 3  -LM      Taking care of personal grooming (such as brushing teeth) 3  -LM      Eating meals 4  -LM      AM-PAC 6 Clicks Score (OT) 18  -LM         Functional Assessment    Outcome Measure Options AM-PAC 6 Clicks Daily Activity (OT)  -LM            User Key  (r) = Recorded By, (t) = Taken By, (c) = Cosigned By    Initials Name Provider Type    Татьяна Mccabe, OT Occupational Therapist                Time Calculation:    Time Calculation- OT     Row Name 12/26/22 1510             Time Calculation- OT    Total Timed Code Minutes- OT 25 minute(s)  -LM            User Key  (r) = Recorded By, (t) = Taken By, (c) = Cosigned By    Initials Name Provider Type    SHAHBAZ Bah  Татьяна ESCOBAR, OT Occupational Therapist              Therapy Charges for Today     Code Description Service Date Service Provider Modifiers Qty    [id number]  OT THER PROC EA 15 MIN 12/26/2022 Татьяна Bah OT GO 2               Татьяна Bah OT  12/26/2022

## 2022-12-26 NOTE — THERAPY TREATMENT NOTE
Acute Care - Physical Therapy Treatment Note   [place]     Patient Name: Tre Kinney  : 1932  MRN: [id number]  Today's Date: 2022   Onset of Illness/Injury or Date of Surgery: 10/06/22  Visit Dx:   No diagnosis found.  Patient Active Problem List   Diagnosis   • Acid reflux   • Ankle arthralgia   • Cardiac conduction disorder   • Closed fracture of distal fibula   • Arteriosclerosis of coronary artery   • Fracture of distal end of tibia   • Elevated cholesterol   • BP (high blood pressure)   • Hypertrophic polyarthritis   • OP (osteoporosis)   • Right heart failure (HCC)   • Heart failure, chronic, right-sided (HCC)   • SOB (shortness of breath)   • Palpitations   • CHF (congestive heart failure) (HCC)   • Congestive heart failure (HCC)   • Precordial pain   • Chronic anticoagulation   • Hypokalemia   • Anemia, unspecified type   • Generalized weakness     Past Medical History:   Diagnosis Date   • Anemia    • Arthritis    • CHF (congestive heart failure) (HCC)    • Chronic kidney disease    • Coronary artery disease    • H/O blood clots    • History of transfusion     no reaction    • Hypertension      Past Surgical History:   Procedure Laterality Date   • ABDOMINAL SURGERY     • APPENDECTOMY     • CARDIAC SURGERY     •  SECTION     • COLONOSCOPY N/A 3/7/2018    Procedure: COLONOSCOPY;  Surgeon: Willie Gresham MD;  Location:  [facility];  Service:    • CORONARY ANGIOPLASTY WITH STENT PLACEMENT     • ENDOSCOPY N/A 3/6/2018    Procedure: ESOPHAGOGASTRODUODENOSCOPY;  Surgeon: Willie Gresham MD;  Location:  [facility];  Service:    • PACEMAKER IMPLANTATION      left side    • SKIN BIOPSY     • TUBAL ABDOMINAL LIGATION       PT Assessment (last 12 hours)     PT Evaluation and Treatment     Row Name 22 1345          Physical Therapy Time and Intention    Subjective Information complains of;weakness  -CT     Document Type therapy note (daily note)  -CT     Mode of Treatment  physical therapy;individual therapy  -CT     Patient Effort good  -CT     Comment Pt treatment session [id number]. Attempted to call pts padmaja Dorsey via [employer] with no answer. Pt ambulated x 2 and performed seated ther ex.  -CT     Row Name 12/26/22 1346          General Information    Patient Profile Reviewed yes  -CT     Row Name 12/26/22 1349          Cognition    Affect/Mental Status (Cognition) WFL  -CT     Orientation Status (Cognition) oriented x 4  -CT     Follows Commands (Cognition) WFL  -CT     Row Name 12/26/22 1341          Bed Mobility    Supine-Sit [place] (Bed Mobility) standby assist;verbal cues;nonverbal cues (demo/gesture)  -CT     Assistive Device (Bed Mobility) bed rails;draw sheet  -CT     Row Name 12/26/22 1340          Transfers    Transfers sit-stand transfer;stand-sit transfer  -CT     Row Name 12/26/22 1340          Sit-Stand Transfer    Sit-Stand [place] (Transfers) minimum assist (75% patient effort);verbal cues  -CT     Assistive Device (Sit-Stand Transfers) walker, front-wheeled  -CT     Row Name 12/26/22 1347          Stand-Sit Transfer    Stand-Sit [place] (Transfers) minimum assist (75% patient effort);moderate assist (50% patient effort)  -CT     Assistive Device (Stand-Sit Transfers) walker, front-wheeled  -CT     Row Name 12/26/22 1340          Gait/Stairs (Locomotion)    [place] Level (Gait) verbal cues;nonverbal cues (demo/gesture);minimum assist (75% patient effort);1 person to manage equipment;1 person assist  -CT     Assistive Device (Gait) walker, front-wheeled  -CT     Distance in Feet (Gait) 12 x 2  -CT     Pattern (Gait) step-to  -CT     Deviations/Abnormal Patterns (Gait) stride length decreased;gait speed decreased;festinating/shuffling;weight shifting decreased  -CT     Bilateral Gait Deviations weight shift ability decreased;forward flexed posture  -CT     Row Name 12/26/22 1701          Motor Skills    Therapeutic Exercise --  seated ther ex:  marches, LAQ, AP, hip abd/add, glut sets 20 reps  -CT     Row Name 12/26/22 1345          Coping    Observed Emotional State calm;cooperative;pleasant  -CT     Verbalized Emotional State acceptance  -CT     Row Name 12/26/22 1345          Plan of Care Review    Plan of Care Reviewed With patient  -CT     Row Name 12/26/22 1345          Positioning and Restraints    Pre-Treatment Position in bed  -CT     Post Treatment Position chair  -CT     In Chair sitting;call light within reach;encouraged to call for assist;notified nsg  -CT           User Key  (r) = Recorded By, (t) = Taken By, (c) = Cosigned By    Initials Name Provider Type    CT Bhargavi Vásquez PT Physical Therapist                Physical Therapy Education     Title: PT OT SLP Therapies (Done)     Topic: Physical Therapy (Done)     Point: Mobility training (Done)     Learning Progress Summary           Patient Acceptance, E, VU by CL at 12/26/2022 1325    Acceptance, E, VU by CL at 12/22/2022 1826    Acceptance, E, VU by CL at 12/21/2022 1303    Acceptance, E, VU by EB at 12/20/2022 1644    Acceptance, E, VU by CL at 12/17/2022 1703    Acceptance, E,D, VU,NR by LL at 12/16/2022 1605    Acceptance, E,TB, VU by AC at 12/16/2022 1523    Acceptance, E,TB, VU by AC at 12/15/2022 1618    Acceptance, E,TB, VU by AC at 12/14/2022 1503    Acceptance, E,TB, VU by DL at 12/13/2022 0240    Acceptance, D,E, VU,NR by LL at 12/12/2022 1455    Acceptance, E,TB, VU by RW at 12/9/2022 1451    Acceptance, E,TB, VU by BC at 11/27/2022 1319    Acceptance, E,TB, VU by RM at 11/20/2022 1449    Acceptance, E,TB, VU by RM at 11/18/2022 1021    Acceptance, E,TB, VU by RH at 11/16/2022 1110    Acceptance, E,TB, VU by KK at 11/14/2022 1638    Acceptance, E,TB, VU by KK at 11/13/2022 1648    Acceptance, E,TB, VU by KG at 11/10/2022 1827    Acceptance, E,TB, VU by DM at 11/9/2022 1638    Acceptance, E,TB, VU by KK at 11/8/2022 1754    Acceptance, E,TB, VU by KK at 11/7/2022 1635     Acceptance, E,D, VU,NR by LL at 11/5/2022 1325    Acceptance, E, VU by EB at 11/4/2022 1448    Acceptance, E,TB, VU by KM at 11/4/2022 1101   Family Acceptance, D,E, VU,NR by LL at 12/12/2022 1455                   Point: Home exercise program (Done)     Learning Progress Summary           Patient Acceptance, E, VU by CL at 12/26/2022 1325    Acceptance, E, VU by CL at 12/22/2022 1826    Acceptance, E, VU by CL at 12/21/2022 1303    Acceptance, E, VU by EB at 12/20/2022 1644    Acceptance, E, VU by CL at 12/17/2022 1703    Acceptance, E,D, VU,NR by LL at 12/16/2022 1605    Acceptance, E,TB, VU by AC at 12/16/2022 1523    Acceptance, E,TB, VU by AC at 12/15/2022 1618    Acceptance, E,TB, VU by AC at 12/14/2022 1503    Acceptance, E,TB, VU by DL at 12/13/2022 0240    Acceptance, D,E, VU,NR by LL at 12/12/2022 1455    Acceptance, E,TB, VU by RW at 12/9/2022 1451    Acceptance, E,TB, VU by BC at 11/27/2022 1319    Acceptance, E,TB, VU by RM at 11/20/2022 1449    Acceptance, E,TB, VU by RM at 11/18/2022 1021    Acceptance, E,TB, VU by RH at 11/16/2022 1110    Acceptance, E,TB, VU by KK at 11/14/2022 1638    Acceptance, E,TB, VU by KK at 11/13/2022 1648    Acceptance, E,TB, VU by KG at 11/10/2022 1827    Acceptance, E,TB, VU by DM at 11/9/2022 1638    Acceptance, E,TB, VU by KK at 11/8/2022 1754    Acceptance, E,TB, VU by KK at 11/7/2022 1635    Acceptance, E,D, VU,NR by LL at 11/5/2022 1325    Acceptance, E, VU by EB at 11/4/2022 1448    Acceptance, E,TB, VU by KM at 11/4/2022 1101   Family Acceptance, D,E, VU,NR by LL at 12/12/2022 1455                   Point: Body mechanics (Done)     Learning Progress Summary           Patient Acceptance, E, VU by CL at 12/26/2022 1325    Acceptance, E, VU by CL at 12/22/2022 1826    Acceptance, E, VU by CL at 12/21/2022 1303    Acceptance, E, VU by EB at 12/20/2022 1644    Acceptance, E, VU by CL at 12/17/2022 1703    Acceptance, E,D, VU,NR by LL at 12/16/2022 1605    Acceptance,  E,TB, VU by AC at 12/16/2022 1523    Acceptance, E,TB, VU by AC at 12/15/2022 1618    Acceptance, E,TB, VU by AC at 12/14/2022 1503    Acceptance, E,TB, VU by DL at 12/13/2022 0240    Acceptance, D,E, VU,NR by LL at 12/12/2022 1455    Acceptance, E,TB, VU by RW at 12/9/2022 1451    Acceptance, E,TB, VU by BC at 11/27/2022 1319    Acceptance, E,TB, VU by RM at 11/20/2022 1449    Acceptance, E,TB, VU by RM at 11/18/2022 1021    Acceptance, E,TB, VU by RH at 11/16/2022 1110    Acceptance, E,TB, VU by KK at 11/14/2022 1638    Acceptance, E,TB, VU by KK at 11/13/2022 1648    Acceptance, E,TB, VU by KG at 11/10/2022 1827    Acceptance, E,TB, VU by DM at 11/9/2022 1638    Acceptance, E,TB, VU by KK at 11/8/2022 1754    Acceptance, E,TB, VU by KK at 11/7/2022 1635    Acceptance, E,D, VU,NR by LL at 11/5/2022 1325    Acceptance, E, VU by EB at 11/4/2022 1448    Acceptance, E,TB, VU by KM at 11/4/2022 1101   Family Acceptance, D,E, VU,NR by LL at 12/12/2022 1455                   Point: Precautions (Done)     Learning Progress Summary           Patient Acceptance, E, VU by CL at 12/26/2022 1325    Acceptance, E, VU by CL at 12/22/2022 1826    Acceptance, E, VU by CL at 12/21/2022 1303    Acceptance, E, VU by EB at 12/20/2022 1644    Acceptance, E, VU by CL at 12/17/2022 1703    Acceptance, E,D, VU,NR by LL at 12/16/2022 1605    Acceptance, E,TB, VU by AC at 12/16/2022 1523    Acceptance, E,TB, VU by AC at 12/15/2022 1618    Acceptance, E,TB, VU by AC at 12/14/2022 1503    Acceptance, E,TB, VU by DL at 12/13/2022 0240    Acceptance, D,E, VU,NR by LL at 12/12/2022 1455    Acceptance, E,TB, VU by RW at 12/9/2022 1451    Acceptance, E,TB, VU by BC at 11/27/2022 1319    Acceptance, E,TB, VU by RM at 11/20/2022 1449    Acceptance, E,TB, VU by RM at 11/18/2022 1021    Acceptance, E,TB, VU by RH at 11/16/2022 1110    Acceptance, E,TB, VU by KK at 11/14/2022 1638    Acceptance, E,TB, VU by KK at 11/13/2022 1648    Acceptance, E,TB, VU  by KG at 11/10/2022 1827    Acceptance, E,TB, VU by DM at 11/9/2022 1638    Acceptance, E,TB, VU by KK at 11/8/2022 1754    Acceptance, E,TB, VU by KK at 11/7/2022 1635    Acceptance, E,D, VU,NR by LL at 11/5/2022 1325    Acceptance, E, VU by EB at 11/4/2022 1448    Acceptance, E,TB, VU by KM at 11/4/2022 1101   Family Acceptance, D,E, VU,NR by LL at 12/12/2022 1455                               User Key     Initials Effective Dates Name Provider Type Discipline    CL 06/16/21 -  Mitzy Mustafa, RN Registered Nurse Nurse    LL 05/02/16 -  Neda Garcia PTA Physical Therapist Assistant PT    RM 06/14/22 -  Bethany Krause, RN Registered Nurse Nurse    DM 06/16/21 -  Moniz, Deborah, RN Registered Nurse Nurse    BC 08/02/22 -  Collett, Brittany, RN Registered Nurse Nurse    AC 10/13/22 -  Jaya Castillo, SONAM Registered Nurse Nurse    KM 05/24/22 -  Mao Vázquez, PT Physical Therapist PT    EB 09/22/22 -  Cedric Oliver, RN Registered Nurse Nurse    KK 06/27/22 -  Chelsi Bolivar, RN Registered Nurse Nurse    DL 03/16/22 -  Francy Garcia, RN Registered Nurse Nurse    KG 09/22/22 -  Verónica Rodriguez, RN Registered Nurse Nurse    RW 09/22/22 -  Bethany Schofield, RN Registered Nurse Nurse    RH 08/03/22 - 12/21/22 Jung Russell, SONAM Extern Registered Nurse Nurse              PT Recommendation and Plan  Anticipated Discharge Disposition (PT): home with 24/7 [facility], skilled nursing facility  Planned Therapy Interventions (PT): balance training, bed mobility training, gait training, home exercise program, manual therapy techniques, motor coordination training, neuromuscular re-education, patient/family education, postural re-education, strengthening, transfer training  Therapy Frequency (PT): 5 times/wk (5-6 times/wk)  Progress Summary (PT)  Progress Toward Functional Goals (PT): progress toward functional goals is gradual  Plan of Care Reviewed With: patient  Progress: improving       Time Calculation:    PT Charges      Row Name 12/26/22 1348             Time Calculation    Start Time 1305  -CT      Stop Time 1335  -CT      Time Calculation (min) 30 min  -CT      PT Received On 12/26/22  -CT            User Key  (r) = Recorded By, (t) = Taken By, (c) = Cosigned By    Initials Name Provider Type    CT Bhargavi Vásquez PT Physical Therapist              Therapy Charges for Today     Code Description Service Date Service Provider Modifiers Qty    26406636488 HC PT THER PROC EA 15 MIN 12/26/2022 Bhargavi Vásquez, PT GP 1    96756259218 HC GAIT TRAINING EA 15 MIN 12/26/2022 Bhargavi Vásquez, PT GP 1          PT G-Codes  Outcome Measure Options: AM-PAC 6 Clicks Basic Mobility (PT)  AM-PAC 6 Clicks Score (PT): 14  AM-PAC 6 Clicks Score (OT): 18    Bhargavi Vásquez PT  12/26/2022

## 2022-12-27 PROCEDURE — 97116 GAIT TRAINING THERAPY: CPT

## 2022-12-27 PROCEDURE — 97110 THERAPEUTIC EXERCISES: CPT

## 2022-12-27 RX ADMIN — Medication 1 TABLET: at 08:39

## 2022-12-27 RX ADMIN — APIXABAN 5 MG: 5 TABLET, FILM COATED ORAL at 20:29

## 2022-12-27 RX ADMIN — ESCITALOPRAM 10 MG: 10 TABLET, FILM COATED ORAL at 08:38

## 2022-12-27 RX ADMIN — METOPROLOL TARTRATE 25 MG: 25 TABLET, FILM COATED ORAL at 08:39

## 2022-12-27 RX ADMIN — HYDRALAZINE HYDROCHLORIDE 5 MG: 25 TABLET, FILM COATED ORAL at 06:04

## 2022-12-27 RX ADMIN — DOCUSATE SODIUM 100 MG: 100 CAPSULE ORAL at 08:39

## 2022-12-27 RX ADMIN — TAMSULOSIN HYDROCHLORIDE 0.4 MG: 0.4 CAPSULE ORAL at 08:39

## 2022-12-27 RX ADMIN — HYDRALAZINE HYDROCHLORIDE 5 MG: 25 TABLET, FILM COATED ORAL at 14:13

## 2022-12-27 RX ADMIN — APIXABAN 5 MG: 5 TABLET, FILM COATED ORAL at 08:38

## 2022-12-27 RX ADMIN — ACETAMINOPHEN 650 MG: 325 TABLET, FILM COATED ORAL at 20:29

## 2022-12-27 RX ADMIN — DOCUSATE SODIUM 100 MG: 100 CAPSULE ORAL at 20:29

## 2022-12-27 RX ADMIN — GABAPENTIN 300 MG: 300 CAPSULE ORAL at 20:29

## 2022-12-27 RX ADMIN — DICLOFENAC 4 G: 10 GEL TOPICAL at 20:29

## 2022-12-27 RX ADMIN — MAGNESIUM GLUCONATE 500 MG ORAL TABLET 400 MG: 500 TABLET ORAL at 08:38

## 2022-12-27 RX ADMIN — Medication 10 MG: at 20:29

## 2022-12-27 RX ADMIN — DICLOFENAC 4 G: 10 GEL TOPICAL at 17:12

## 2022-12-27 RX ADMIN — PANTOPRAZOLE SODIUM 40 MG: 40 TABLET, DELAYED RELEASE ORAL at 06:04

## 2022-12-27 RX ADMIN — HYDRALAZINE HYDROCHLORIDE 5 MG: 25 TABLET, FILM COATED ORAL at 22:08

## 2022-12-27 RX ADMIN — FUROSEMIDE 40 MG: 40 TABLET ORAL at 17:12

## 2022-12-27 RX ADMIN — METOPROLOL TARTRATE 25 MG: 25 TABLET, FILM COATED ORAL at 20:29

## 2022-12-27 RX ADMIN — FUROSEMIDE 40 MG: 40 TABLET ORAL at 08:38

## 2022-12-27 NOTE — PAYOR COMM NOTE
"Michelet Jacques RN  Swing Bed Nurse  [phone] Ex 1442 FAX: [phone]  [email or web address]  [facility]  NPI [id number]  Reference Number [id number]    Clinical updates for  PT/OT strength and mobility training       Tre Kinney (89 y.o. Female)    YOB: 1932  Social Security Number:   Address: [address] [place] [address]  Home Phone: [phone]  MRN: [id number]  Restoration: Confucianism  Marital Status:         Admission Date: 10/5/22  Admission Type: Urgent  Admitting Provider: Jannet Lagunas MD  Attending Provider: Benito Gil DO  Department, Room/Bed: [place] [id number]/  Discharge Date:   Discharge Disposition:   Discharge Destination:               Attending Provider: Benito Gil DO    Allergies: Adhesive Tape, Xanax [Alprazolam]    Isolation: None   Infection: None   Code Status: No CPR    Ht: 170.2 cm (67.01\")   Wt: 77.1 kg (170 lb)    Admission Cmt: None   Principal Problem: Generalized weakness [R53.1]                 Active Insurance as of 10/5/2022     Primary Coverage     Payor Plan Insurance Group Employer/Plan Group    ANTHEM MEDICARE REPLACEMENT ANTHEM MEDICARE ADVANTAGE [id number]     Payor Plan Address Payor Plan Phone Number Payor Plan Fax Number Effective Dates    [address] [phone]  1/1/2022 - None Entered    [facility] [id number]       Subscriber Name Subscriber Birth Date Member ID       TRE KINNEY 12/9/1932 [id number]                 Emergency Contacts      (Rel.) Home Phone Work Phone Mobile Phone    Valdez Kinney (Son) [phone] None None    Leno Kinney (Son) [phone] None [phone]    Willian Kinney (Son) None None [phone]              Current Facility-Administered Medications   Medication Dose Route Frequency Provider Last Rate Last Admin   • acetaminophen (TYLENOL) tablet 650 mg  650 mg Oral Q6H PRN Mustapha Loredo DO   650 mg at " 12/26/22 0814   • apixaban (ELIQUIS) tablet 5 mg  5 mg Oral Q12H Mustapha Loredo DO   5 mg at 12/27/22 0838   • benzocaine-menthol (DERMOPLAST) 20-0.5 % topical spray   Topical 4x Daily PRN Kennedi Saucedo MD   Given at 11/03/22 0548   • Diclofenac Sodium (VOLTAREN) 1 % gel 4 g  4 g Topical 4x Daily Jannet Lagunas MD   4 g at 12/26/22 2152   • docusate sodium (COLACE) capsule 100 mg  100 mg Oral BID Jannet Lagunas MD   100 mg at 12/27/22 0839   • escitalopram (LEXAPRO) tablet 10 mg  10 mg Oral Daily Jannet Lagunas MD   10 mg at 12/27/22 0838   • furosemide (LASIX) tablet 40 mg  40 mg Oral BID Jannet Lagunas MD   40 mg at 12/27/22 0838   • gabapentin (NEURONTIN) capsule 300 mg  300 mg Oral Nightly Jannet Lagunas MD   300 mg at 12/26/22 2153   • Glycerin (INDRA/PED) suppository 1 g  1 g Rectal Q12H PRN Jannet Lagunas MD       • guaiFENesin-dextromethorphan (ROBITUSSIN DM) 100-10 MG/5ML syrup 5 mL  5 mL Oral Q4H PRN Kennedi Saucedo MD       • hydrALAZINE (APRESOLINE) injection 10 mg  10 mg Intravenous Q6H PRN Mustapha Loredo DO       • hydrALAZINE (APRESOLINE) tablet 5 mg  5 mg Oral Q8H Jannet Lagunas MD   5 mg at 12/27/22 0604   • HYDROcodone-acetaminophen (NORCO) 7.5-325 MG per tablet 1 tablet  1 tablet Oral Q8H PRN Jannet Lagunas MD   1 tablet at 12/08/22 2129   • magnesium oxide (MAG-OX) tablet 400 mg  400 mg Oral Daily Jannet Lagunas MD   400 mg at 12/27/22 0838   • Magnesium Sulfate 2 gram Bolus, followed by 8 gram infusion (total Mg dose 10 grams)- Mg less than or equal to 1mg/dL  2 g Intravenous PRN Jannet Lagunas MD        Or   • Magnesium Sulfate 2 gram / 50mL Infusion (GIVE X 3 BAGS TO EQUAL 6GM TOTAL DOSE) - Mg 1.1 - 1.5 mg/dl  2 g Intravenous PRN Jannet Lagunas MD        Or   • Magnesium Sulfate 4 gram infusion- Mg 1.6-1.9 mg/dL  4 g Intravenous PRN Jannet Lagunas MD       • melatonin tablet 10 mg  10 mg Oral Nightly Jannet Lagunas  MD DENNY   10 mg at 12/26/22 2152   • metoprolol tartrate (LOPRESSOR) tablet 25 mg  25 mg Oral Q12H Dante Levine MD   25 mg at 12/27/22 0839   • multivitamin (THERAGRAN) tablet 1 tablet  1 tablet Oral Daily Jannet Lagunas MD   1 tablet at 12/27/22 0839   • ondansetron (ZOFRAN) injection 4 mg  4 mg Intravenous Q6H PRN Jannet Lagunas MD       • pantoprazole (PROTONIX) EC tablet 40 mg  40 mg Oral Q AM Jannet Lagunas MD   40 mg at 12/27/22 0604   • phenol (CHLORASEPTIC) 1.4 % liquid 1 spray  1 spray Mouth/Throat Q2H PRN Jannet Lagunas MD   1 spray at 10/13/22 2056   • polyethylene glycol (MIRALAX) packet 17 g  17 g Oral BID PRN Jannet Lagunas MD   17 g at 12/14/22 0053   • potassium chloride (K-DUR,KLOR-CON) CR tablet 40 mEq  40 mEq Oral PRN Jannet Lagunas MD        Or   • potassium chloride (KLOR-CON) packet 40 mEq  40 mEq Oral PRN Jannet Lagunas MD        Or   • potassium chloride 10 mEq in 100 mL IVPB  10 mEq Intravenous Q1H PRN Jannet Lagunas MD       • prochlorperazine (COMPAZINE) injection 5 mg  5 mg Intravenous Q6H PRN Jannet Lagunas MD        Or   • prochlorperazine (COMPAZINE) tablet 5 mg  5 mg Oral Q6H PRN Jannet Lagunas MD   5 mg at 12/16/22 1046    Or   • prochlorperazine (COMPAZINE) suppository 25 mg  25 mg Rectal Q12H PRN Jannet Lagunas MD       • promethazine (PHENERGAN) 6.25 mg in sodium chloride 0.9 % 50 mL  6.25 mg Intravenous Q6H PRN Jannet Lagunas MD       • sodium chloride 0.9 % flush 10 mL  10 mL Intravenous PRN Jannet Lagunas MD       • sodium chloride 0.9 % flush 10 mL  10 mL Intravenous Q12H Jannet Lagunas MD   10 mL at 12/12/22 0804   • sodium chloride 0.9 % flush 10 mL  10 mL Intravenous PRN Jannet Lagunas MD       • sodium chloride 0.9 % flush 10 mL  10 mL Intravenous Q12H Jannet Lagunas MD   10 mL at 12/15/22 2117   • sodium chloride 0.9 % flush 10 mL  10 mL Intravenous PRN Jannet Lagunas MD       • tamsulosin (FLOMAX) 24 hr  capsule 0.4 mg  0.4 mg Oral Daily Jannet Lagunas MD   0.4 mg at 12/27/22 0839        Physician Progress Notes (last 72 hours)      Benito Gil DO at 12/26/22 1910        Patient currently in swing bed for additional therapy in the setting of generalized weakness and debility.  Patient not seen and examined today.  Patient initially admitted to swing bed on 10/5 and approved for additional swing bed days through 12/28.  We will continue with PT and OT.  Discussed with patient's daytime nurse Ladan who reports patient without any acute issues or events.  Patient to be continued on Eliquis for treatment of DVT.    Electronically signed by Benito Gil DO at 12/26/22 1919     Mustapha Loredo DO at 12/25/22 1423        Pt admitted to swing bed on 10/5 for additional therapy in the setting of generalized weakness/debility. Pt briefly seen to wish her a Bev Estrada but not examined today. I have reviewed her VS. I have discussed her care with SONAM Kurtz who does not report any acute events or issues that need to be addressed. Pt states she feels tired today but no specific complaints reported. BP has fluctuated today. Cont current antihypertensive regimen. Cont Eliquis for treatment of DVT. Cont PT/OT. Repeat labs in the AM. Pt has been approved for additional swing bed days through 12/28. Cont supportive treatment.       Mustapha Loredo DO  12/25/22  15:52 EDT                Electronically signed by Mustapha Loredo DO at 12/25/22 1425     Mustapha Loredo DO at 12/24/22 1250        Pt admitted to swing bed on 10/5 for additional therapy in the setting of generalized weakness/debility. Pt not seen or examined today. I have reviewed her VS. I have discussed her care with SONAM Sims who does not report any acute events or issues that need to be addressed. She does report episode of mild dizziness this AM. BP mildly elevated but remains overall stable today. Cont current  antihypertensive regimen. Cont Eliquis for treatment of DVT. Cont PT/OT. Pt has been approved for additional swing bed days through 12/28. Cont supportive treatment.       Mustapha Loredo DO  12/24/22  15:52 EDT                Electronically signed by Mustapha Lordeo DO at 12/24/22 1250          Consult Notes (most recent note)      Andrew Chapman MD at 11/03/22 1249      Consult Orders    1. Inpatient Psychiatrist Consult [[id number]] ordered by Kennedi Saucedo MD at 11/03/22 0744    2. Inpatient Psychiatrist Consult [[id number]] ordered by Kennedi Saucedo MD at 10/31/22 1147                   Referring Provider: Lewis  Reason for Consultation: Capacity      Chief complaint/Focus of Exam: Capacity Evaluation     Subjective .     History of present illness: Patient is an 89-year-old female with a history significant for CHF, CKD, CAD, history of clots, chronic anticoagulation, sick sinus syndrome with pacer placement, chronic diastolic CHF, anemia, and depression who originally admitted to the hospital after a fall.  Psychiatry was originally consulted early in her hospitalization 1 month ago for depressive symptoms and for medication management in light of hyponatremia.  I evaluated patient at that time.  Psychiatry was reconsulted to evaluate patient for medical decision-making capacity as there have been some concerns about treatment care planning going forward and family has been involved.  I evaluated patient at bedside today while she was eating lunch.  She was sitting up and reported that she was having some neck pain but denied any other complaints.  She was alert and oriented x4.  She is able to answer all my questions appropriately.  She was not experiencing any confusion, hallucinations, delusions, or paranoia.  She denied SI/HI/AVH.  She was making jokes about being in the hospital and seemed to be in good spirits despite her neck pain.    Review of Systems  Pertinent items  are noted in HPI    History  Past Medical History:   Diagnosis Date   • Anemia    • Arthritis    • CHF (congestive heart failure) (HCC)    • Chronic kidney disease    • Coronary artery disease    • H/O blood clots    • History of transfusion     no reaction    • Hypertension    ,   Past Surgical History:   Procedure Laterality Date   • ABDOMINAL SURGERY     • APPENDECTOMY     • CARDIAC SURGERY     •  SECTION     • COLONOSCOPY N/A 3/7/2018    Procedure: COLONOSCOPY;  Surgeon: Willie Gresham MD;  Location:  [facility];  Service:    • CORONARY ANGIOPLASTY WITH STENT PLACEMENT     • ENDOSCOPY N/A 3/6/2018    Procedure: ESOPHAGOGASTRODUODENOSCOPY;  Surgeon: Willie Gresham MD;  Location:  [facility];  Service:    • PACEMAKER IMPLANTATION      left side    • SKIN BIOPSY     • TUBAL ABDOMINAL LIGATION     ,   Family History   Problem Relation Age of Onset   • Other Mother         cardiac disorder   • Hypertension Mother    • Heart disease Mother    • Thyroid disease Mother    • Other Father         cardiac disorder   • Heart disease Father    • Hypertension Father    • Other Sister         cardiac disorder   • Other Brother         cardiac disorder   • Other Other         cardiac disorder   • Other Other         cardiac disorder   ,   Social History     Socioeconomic History   • Marital status:    Tobacco Use   • Smoking status: Never   Substance and Sexual Activity   • Alcohol use: No   • Drug use: No   • Sexual activity: Defer     E-cigarette/Vaping     E-cigarette/Vaping Substances     E-cigarette/Vaping Devices       ,   Medications Prior to Admission   Medication Sig Dispense Refill Last Dose   • ALPRAZolam (XANAX) 1 MG tablet Take 1 mg by mouth 2 (Two) Times a Day As Needed for Anxiety.   Unknown at Unknown time   • aspirin 81 MG EC tablet Take 81 mg by mouth Daily.   Unknown at Unknown time   • escitalopram (LEXAPRO) 10 MG tablet Take 10 mg by mouth Daily.   Unknown at Unknown time   •  furosemide (LASIX) 40 MG tablet Take 40 mg by mouth Daily.   Unknown at Unknown time   • gabapentin (NEURONTIN) 400 MG capsule Take 400 mg by mouth Every Night.   Unknown at Unknown time   • hydrALAZINE (APRESOLINE) 100 MG tablet Take 1 tablet by mouth Every 8 (Eight) Hours. 90 tablet 0 Unknown at Unknown time   • HYDROcodone-acetaminophen (NORCO) 7.5-325 MG per tablet Take 1 tablet by mouth Every 8 (Eight) Hours As Needed for Moderate Pain.   Unknown at Unknown time   • isosorbide mononitrate (IMDUR) 60 MG 24 hr tablet Take 60 mg by mouth Daily.   Unknown at Unknown time   • metoprolol tartrate (LOPRESSOR) 50 MG tablet Take 50 mg by mouth 2 (Two) Times a Day.   Unknown at Unknown time   • pantoprazole (PROTONIX) 40 MG EC tablet Take 40 mg by mouth Daily.   Unknown at Unknown time   • potassium chloride (MICRO-K) 10 MEQ CR capsule Take 10 mEq by mouth 2 (Two) Times a Day.   Unknown at Unknown time   • warfarin (COUMADIN) 6 MG tablet Take 6 mg by mouth Daily.   Unknown at Unknown time   , Scheduled Meds:  apixaban, 5 mg, Oral, Q12H  escitalopram, 10 mg, Oral, Daily  furosemide, 40 mg, Oral, BID  gabapentin, 300 mg, Oral, Nightly  hydrALAZINE, 5 mg, Oral, Q8H  melatonin, 10 mg, Oral, Nightly  metoprolol tartrate, 12.5 mg, Oral, Q12H  multivitamin, 1 tablet, Oral, Daily  pantoprazole, 40 mg, Oral, Q AM  sodium chloride, 10 mL, Intravenous, Q12H  sodium chloride, 10 mL, Intravenous, Q12H  tamsulosin, 0.4 mg, Oral, Daily    , Continuous Infusions:  Pharmacy Consult,     , PRN Meds:  •  benzocaine-menthol  •  docusate sodium  •  HYDROcodone-acetaminophen  •  magnesium sulfate **OR** magnesium sulfate **OR** magnesium sulfate  •  ondansetron  •  Pharmacy Consult  •  phenol  •  polyethylene glycol  •  potassium chloride **OR** potassium chloride **OR** potassium chloride  •  prochlorperazine **OR** prochlorperazine **OR** prochlorperazine  •  promethazine  •  sodium chloride  •  sodium chloride  •  sodium chloride and  Allergies:  Adhesive tape and Xanax [alprazolam]    Objective     Vital Signs   Temp:  [98.2 °F (36.8 °C)-98.4 °F (36.9 °C)] 98.2 °F (36.8 °C)  Heart Rate:  [71-77] 75  Resp:  [16-18] 16  BP: (129-161)/() 161/116    Mental Status Exam:   Mental Status Exam:    Hygiene:   good  Cooperation:  Cooperative  Eye Contact:  Good  Psychomotor Behavior:  Slow  Affect:  Full range  Hopelessness: Optimistic  Speech:  Normal  Thought Progress:  Goal directed and Linear  Thought Content:  Normal and Mood congruent  Suicidal:  None  Homicidal:  None  Hallucinations:  None  Delusion:  None  Memory:  Intact  Orientation:  Person, Place, Time and Situation  Reliability:  good  Insight:  Fair  Judgement:  Fair  Impulse Control:  Fair    Results Review:   I reviewed the patient's new clinical results.  Lab Results (last 24 hours)     ** No results found for the last 24 hours. **        Imaging Results (Last 24 Hours)     ** No results found for the last 24 hours. **            Assessment & Plan     Evaluation for medical decision making capacity  -At this point in time, patient does have capacity for medical decision making.  She is alert and oriented and able to answer questions appropriately and I do not have any concerns about her making her own treatment plan and decisions going forward.    I discussed the patients findings and my recommendations with patient    Andrew Chapman MD  11/03/22  12:49 EDT        Electronically signed by Andrew Chapman MD at 11/03/22 1253          Nutrition Notes (most recent note)      Jose Oneal RD at 12/27/22 1026        Nutrition Services    Patient Name:  Tre Kinney  YOB: 1932  MRN: [id number]  Admit Date:  10/5/2022    BMI: 26.62  Diet: Regular  Intake: 65% x 12 meals  Fluid: not meeting needs     Electronically signed by:  Jose Oneal RD  12/27/22 10:26 EST     Electronically signed by Jose Oneal RD at 12/27/22 1026          Physical Therapy Notes (most  recent note)      Bhargavi Vásquez, PT at 22 1349  Version 1 of 1         Acute Care - Physical Therapy Treatment Note   [place]     Patient Name: Tre Kinney  : 1932  MRN: [id number]  Today's Date: 2022   Onset of Illness/Injury or Date of Surgery: 10/06/22  Visit Dx:   No diagnosis found.  Patient Active Problem List   Diagnosis   • Acid reflux   • Ankle arthralgia   • Cardiac conduction disorder   • Closed fracture of distal fibula   • Arteriosclerosis of coronary artery   • Fracture of distal end of tibia   • Elevated cholesterol   • BP (high blood pressure)   • Hypertrophic polyarthritis   • OP (osteoporosis)   • Right heart failure (HCC)   • Heart failure, chronic, right-sided (HCC)   • SOB (shortness of breath)   • Palpitations   • CHF (congestive heart failure) ([facility])   • Congestive heart failure (HCC)   • Precordial pain   • Chronic anticoagulation   • Hypokalemia   • Anemia, unspecified type   • Generalized weakness     Past Medical History:   Diagnosis Date   • Anemia    • Arthritis    • CHF (congestive heart failure) ([facility])    • Chronic kidney disease    • Coronary artery disease    • H/O blood clots    • History of transfusion     no reaction    • Hypertension      Past Surgical History:   Procedure Laterality Date   • ABDOMINAL SURGERY     • APPENDECTOMY     • CARDIAC SURGERY     •  SECTION     • COLONOSCOPY N/A 3/7/2018    Procedure: COLONOSCOPY;  Surgeon: Willie Gresham MD;  Location:  [facility];  Service:    • CORONARY ANGIOPLASTY WITH STENT PLACEMENT     • ENDOSCOPY N/A 3/6/2018    Procedure: ESOPHAGOGASTRODUODENOSCOPY;  Surgeon: Willie Gresham MD;  Location:  [facility];  Service:    • PACEMAKER IMPLANTATION      left side    • SKIN BIOPSY     • TUBAL ABDOMINAL LIGATION       PT Assessment (last 12 hours)     PT Evaluation and Treatment     Row Name 22 1345          Physical Therapy Time and Intention    Subjective Information complains of;weakness   -CT     Document Type therapy note (daily note)  -CT     Mode of Treatment physical therapy;individual therapy  -CT     Patient Effort good  -CT     Comment Pt treatment session [id number]. Attempted to call pts padmaja Dorsey via [employer] with no answer. Pt ambulated x 2 and performed seated ther ex.  -CT     Row Name 12/26/22 134          General Information    Patient Profile Reviewed yes  -CT     Row Name 12/26/22 1345          Cognition    Affect/Mental Status (Cognition) WFL  -CT     Orientation Status (Cognition) oriented x 4  -CT     Follows Commands (Cognition) WFL  -CT     Row Name 12/26/22 1345          Bed Mobility    Supine-Sit [place] (Bed Mobility) standby assist;verbal cues;nonverbal cues (demo/gesture)  -CT     Assistive Device (Bed Mobility) bed rails;draw sheet  -CT     Row Name 12/26/22 134          Transfers    Transfers sit-stand transfer;stand-sit transfer  -CT     Row Name 12/26/22 1348          Sit-Stand Transfer    Sit-Stand [place] (Transfers) minimum assist (75% patient effort);verbal cues  -CT     Assistive Device (Sit-Stand Transfers) walker, front-wheeled  -CT     Row Name 12/26/22 1348          Stand-Sit Transfer    Stand-Sit [place] (Transfers) minimum assist (75% patient effort);moderate assist (50% patient effort)  -CT     Assistive Device (Stand-Sit Transfers) walker, front-wheeled  -CT     Row Name 12/26/22 1343          Gait/Stairs (Locomotion)    [place] Level (Gait) verbal cues;nonverbal cues (demo/gesture);minimum assist (75% patient effort);1 person to manage equipment;1 person assist  -CT     Assistive Device (Gait) walker, front-wheeled  -CT     Distance in Feet (Gait) 12 x 2  -CT     Pattern (Gait) step-to  -CT     Deviations/Abnormal Patterns (Gait) stride length decreased;gait speed decreased;festinating/shuffling;weight shifting decreased  -CT     Bilateral Gait Deviations weight shift ability decreased;forward flexed posture  -CT     Row Name 12/26/22  1345          Motor Skills    Therapeutic Exercise --  seated ther ex: marches, LAQ, AP, hip abd/add, glut sets 20 reps  -CT     Row Name 12/26/22 1345          Coping    Observed Emotional State calm;cooperative;pleasant  -CT     Verbalized Emotional State acceptance  -CT     Row Name 12/26/22 1345          Plan of Care Review    Plan of Care Reviewed With patient  -CT     Row Name 12/26/22 1345          Positioning and Restraints    Pre-Treatment Position in bed  -CT     Post Treatment Position chair  -CT     In Chair sitting;call light within reach;encouraged to call for assist;notified nsg  -CT           User Key  (r) = Recorded By, (t) = Taken By, (c) = Cosigned By    Initials Name Provider Type    CT Bhargavi Vásquez PT Physical Therapist                Physical Therapy Education     Title: PT OT SLP Therapies (Done)     Topic: Physical Therapy (Done)     Point: Mobility training (Done)     Learning Progress Summary           Patient Acceptance, E, VU by CL at 12/26/2022 1325    Acceptance, E, VU by CL at 12/22/2022 1826    Acceptance, E, VU by CL at 12/21/2022 1303    Acceptance, E, VU by EB at 12/20/2022 1644    Acceptance, E, VU by CL at 12/17/2022 1703    Acceptance, E,D, VU,NR by LL at 12/16/2022 1605    Acceptance, E,TB, VU by AC at 12/16/2022 1523    Acceptance, E,TB, VU by AC at 12/15/2022 1618    Acceptance, E,TB, VU by AC at 12/14/2022 1503    Acceptance, E,TB, VU by DL at 12/13/2022 0240    Acceptance, D,E, VU,NR by LL at 12/12/2022 1455    Acceptance, E,TB, VU by RW at 12/9/2022 1451    Acceptance, E,TB, VU by BC at 11/27/2022 1319    Acceptance, E,TB, VU by RM at 11/20/2022 1449    Acceptance, E,TB, VU by RM at 11/18/2022 1021    Acceptance, E,TB, VU by RH at 11/16/2022 1110    Acceptance, E,TB, VU by KK at 11/14/2022 1638    Acceptance, E,TB, VU by KK at 11/13/2022 1648    Acceptance, E,TB, VU by KG at 11/10/2022 1827    Acceptance, E,TB, VU by DM at 11/9/2022 1638    Acceptance, E,TB, VU by KK  at 11/8/2022 1754    Acceptance, E,TB, VU by KK at 11/7/2022 1635    Acceptance, E,D, VU,NR by LL at 11/5/2022 1325    Acceptance, E, VU by EB at 11/4/2022 1448    Acceptance, E,TB, VU by KM at 11/4/2022 1101   Family Acceptance, D,E, VU,NR by LL at 12/12/2022 1455                   Point: Home exercise program (Done)     Learning Progress Summary           Patient Acceptance, E, VU by CL at 12/26/2022 1325    Acceptance, E, VU by CL at 12/22/2022 1826    Acceptance, E, VU by CL at 12/21/2022 1303    Acceptance, E, VU by EB at 12/20/2022 1644    Acceptance, E, VU by CL at 12/17/2022 1703    Acceptance, E,D, VU,NR by LL at 12/16/2022 1605    Acceptance, E,TB, VU by AC at 12/16/2022 1523    Acceptance, E,TB, VU by AC at 12/15/2022 1618    Acceptance, E,TB, VU by AC at 12/14/2022 1503    Acceptance, E,TB, VU by DL at 12/13/2022 0240    Acceptance, D,E, VU,NR by LL at 12/12/2022 1455    Acceptance, E,TB, VU by RW at 12/9/2022 1451    Acceptance, E,TB, VU by BC at 11/27/2022 1319    Acceptance, E,TB, VU by RM at 11/20/2022 1449    Acceptance, E,TB, VU by RM at 11/18/2022 1021    Acceptance, E,TB, VU by RH at 11/16/2022 1110    Acceptance, E,TB, VU by KK at 11/14/2022 1638    Acceptance, E,TB, VU by KK at 11/13/2022 1648    Acceptance, E,TB, VU by KG at 11/10/2022 1827    Acceptance, E,TB, VU by DM at 11/9/2022 1638    Acceptance, E,TB, VU by KK at 11/8/2022 1754    Acceptance, E,TB, VU by KK at 11/7/2022 1635    Acceptance, E,D, VU,NR by LL at 11/5/2022 1325    Acceptance, E, VU by EB at 11/4/2022 1448    Acceptance, E,TB, VU by KM at 11/4/2022 1101   Family Acceptance, D,E, VU,NR by LL at 12/12/2022 1455                   Point: Body mechanics (Done)     Learning Progress Summary           Patient Acceptance, E, VU by CL at 12/26/2022 1325    Acceptance, E, VU by CL at 12/22/2022 1826    Acceptance, E, VU by CL at 12/21/2022 1303    Acceptance, E, VU by EB at 12/20/2022 1644    Acceptance, E, VU by CL at 12/17/2022 1703     Acceptance, E,D, VU,NR by LL at 12/16/2022 1605    Acceptance, E,TB, VU by AC at 12/16/2022 1523    Acceptance, E,TB, VU by AC at 12/15/2022 1618    Acceptance, E,TB, VU by AC at 12/14/2022 1503    Acceptance, E,TB, VU by DL at 12/13/2022 0240    Acceptance, D,E, VU,NR by LL at 12/12/2022 1455    Acceptance, E,TB, VU by RW at 12/9/2022 1451    Acceptance, E,TB, VU by BC at 11/27/2022 1319    Acceptance, E,TB, VU by RM at 11/20/2022 1449    Acceptance, E,TB, VU by RM at 11/18/2022 1021    Acceptance, E,TB, VU by RH at 11/16/2022 1110    Acceptance, E,TB, VU by KK at 11/14/2022 1638    Acceptance, E,TB, VU by KK at 11/13/2022 1648    Acceptance, E,TB, VU by KG at 11/10/2022 1827    Acceptance, E,TB, VU by DM at 11/9/2022 1638    Acceptance, E,TB, VU by KK at 11/8/2022 1754    Acceptance, E,TB, VU by KK at 11/7/2022 1635    Acceptance, E,D, VU,NR by LL at 11/5/2022 1325    Acceptance, E, VU by EB at 11/4/2022 1448    Acceptance, E,TB, VU by KM at 11/4/2022 1101   Family Acceptance, D,E, VU,NR by LL at 12/12/2022 1455                   Point: Precautions (Done)     Learning Progress Summary           Patient Acceptance, E, VU by CL at 12/26/2022 1325    Acceptance, E, VU by CL at 12/22/2022 1826    Acceptance, E, VU by CL at 12/21/2022 1303    Acceptance, E, VU by EB at 12/20/2022 1644    Acceptance, E, VU by CL at 12/17/2022 1703    Acceptance, E,D, VU,NR by LL at 12/16/2022 1605    Acceptance, E,TB, VU by AC at 12/16/2022 1523    Acceptance, E,TB, VU by AC at 12/15/2022 1618    Acceptance, E,TB, VU by AC at 12/14/2022 1503    Acceptance, E,TB, VU by DL at 12/13/2022 0240    Acceptance, D,E, VU,NR by LL at 12/12/2022 1455    Acceptance, E,TB, VU by RW at 12/9/2022 1451    Acceptance, E,TB, VU by BC at 11/27/2022 1319    Acceptance, E,TB, VU by RM at 11/20/2022 1449    Acceptance, E,TB, VU by RM at 11/18/2022 1021    Acceptance, E,TB, VU by RH at 11/16/2022 1110    Acceptance, E,TB, VU by KK at 11/14/2022 1638     Acceptance, E,TB, VU by KK at 11/13/2022 1648    Acceptance, E,TB, VU by KG at 11/10/2022 1827    Acceptance, E,TB, VU by DM at 11/9/2022 1638    Acceptance, E,TB, VU by KK at 11/8/2022 1754    Acceptance, E,TB, VU by KK at 11/7/2022 1635    Acceptance, E,D, VU,NR by LL at 11/5/2022 1325    Acceptance, E, VU by EB at 11/4/2022 1448    Acceptance, E,TB, VU by KM at 11/4/2022 1101   Family Acceptance, D,E, VU,NR by LL at 12/12/2022 1455                               User Key     Initials Effective Dates Name Provider Type Discipline    CL 06/16/21 -  Mitzy Mustafa, RN Registered Nurse Nurse    LL 05/02/16 -  Neda Garcia, NACHO Physical Therapist Assistant PT    RM 06/14/22 -  Bethany Krause, RN Registered Nurse Nurse    DM 06/16/21 -  Moniz, Deborah, RN Registered Nurse Nurse    BC 08/02/22 -  Collett, Brittany, RN Registered Nurse Nurse    AC 10/13/22 -  Jaya Castillo RN Registered Nurse Nurse    KM 05/24/22 -  Mao Vázquez, PT Physical Therapist PT    EB 09/22/22 -  Cedric Oliver, RN Registered Nurse Nurse    KK 06/27/22 -  Chelsi Bolivar, RN Registered Nurse Nurse    DL 03/16/22 -  Francy Garcia, RN Registered Nurse Nurse    KG 09/22/22 -  Verónica Rodriguez, RN Registered Nurse Nurse    RW 09/22/22 -  Bethany Schofield, RN Registered Nurse Nurse    RH 08/03/22 - 12/21/22 Jung Russell, SONAM Extern Registered Nurse Nurse              PT Recommendation and Plan  Anticipated Discharge Disposition (PT): home with 24/7 care, skilled nursing facility  Planned Therapy Interventions (PT): balance training, bed mobility training, gait training, home exercise program, manual therapy techniques, motor coordination training, neuromuscular re-education, patient/family education, postural re-education, strengthening, transfer training  Therapy Frequency (PT): 5 times/wk (5-6 times/wk)  Progress Summary (PT)  Progress Toward Functional Goals (PT): progress toward functional goals is gradual  Plan of Care Reviewed With:  patient  Progress: improving       Time Calculation:    PT Charges     Row Name 22 1348             Time Calculation    Start Time 1305  -CT      Stop Time 1335  -CT      Time Calculation (min) 30 min  -CT      PT Received On 22  -CT            User Key  (r) = Recorded By, (t) = Taken By, (c) = Cosigned By    Initials Name Provider Type    CT Bhargavi Vásquez, PT Physical Therapist              Therapy Charges for Today     Code Description Service Date Service Provider Modifiers Qty    51050870226 HC PT THER PROC EA 15 MIN 2022 Bhargavi Vásquez, PT GP 1    30376074010 HC GAIT TRAINING EA 15 MIN 2022 Bhargavi Vásquez, PT GP 1          PT G-Codes  Outcome Measure Options: AM-PAC 6 Clicks Basic Mobility (PT)  AM-PAC 6 Clicks Score (PT): 14  AM-PAC 6 Clicks Score (OT): 18    Bhargavi Vásquez PT  2022      Electronically signed by Bhargavi Vásquez PT at 22 1349          Occupational Therapy Notes (most recent note)      Татьяна Bah, OT at 22 1510          Acute Care - Occupational Therapy Treatment Note  [facility]     Patient Name: Tre Kinney  : 1932  MRN: [id number]  Today's Date: 2022  Onset of Illness/Injury or Date of Surgery: 10/06/22     Referring Physician: Janneth    Admit Date: 10/5/2022     No diagnosis found.  Patient Active Problem List   Diagnosis   • Acid reflux   • Ankle arthralgia   • Cardiac conduction disorder   • Closed fracture of distal fibula   • Arteriosclerosis of coronary artery   • Fracture of distal end of tibia   • Elevated cholesterol   • BP (high blood pressure)   • Hypertrophic polyarthritis   • OP (osteoporosis)   • Right heart failure (HCC)   • Heart failure, chronic, right-sided (HCC)   • SOB (shortness of breath)   • Palpitations   • CHF (congestive heart failure) (HCC)   • Congestive heart failure (HCC)   • Precordial pain   • Chronic anticoagulation   • Hypokalemia   • Anemia, unspecified type   • Generalized weakness     Past  Medical History:   Diagnosis Date   • Anemia    • Arthritis    • CHF (congestive heart failure) (HCC)    • Chronic kidney disease    • Coronary artery disease    • H/O blood clots    • History of transfusion     no reaction    • Hypertension      Past Surgical History:   Procedure Laterality Date   • ABDOMINAL SURGERY     • APPENDECTOMY     • CARDIAC SURGERY     •  SECTION     • COLONOSCOPY N/A 3/7/2018    Procedure: COLONOSCOPY;  Surgeon: Willie Gresham MD;  Location:  [facility];  Service:    • CORONARY ANGIOPLASTY WITH STENT PLACEMENT     • ENDOSCOPY N/A 3/6/2018    Procedure: ESOPHAGOGASTRODUODENOSCOPY;  Surgeon: Willie Gresham MD;  Location:  [facility];  Service:    • PACEMAKER IMPLANTATION      left side    • SKIN BIOPSY     • TUBAL ABDOMINAL LIGATION           OT ASSESSMENT FLOWSHEET (last 12 hours)     OT Evaluation and Treatment     Row Name 22 3124                   OT Time and Intention    Subjective Information no complaints  -LM        Document Type therapy note (daily note)  -LM        Mode of Treatment occupational therapy  -LM        Patient Effort good  -LM        Comment Patient seen this date for light bue therex/TA from 8008-8259.  Patient performed light bue strengthening with yellow tband and light towel therex.  Frequent rest breaks, good participation.  Performed in bedside recliner.  -LM           General Information    Existing Precautions/Restrictions fall  -LM           Cognition    Affect/Mental Status (Cognition) WFL  -LM        Orientation Status (Cognition) oriented x 4  -LM           Positioning and Restraints    Post Treatment Position chair  -LM        In Chair encouraged to call for assist;call light within reach  -LM              User Key  (r) = Recorded By, (t) = Taken By, (c) = Cosigned By    Initials Name Effective Dates    LM Татьяна Bah, OT 21 -                        OT Recommendation and Plan           Outcome Measures     Row Name  12/26/22 1500             How much help from another is currently needed...    Putting on and taking off regular lower body clothing? 2  -LM      Bathing (including washing, rinsing, and drying) 3  -LM      Toileting (which includes using toilet bed pan or urinal) 3  -LM      Putting on and taking off regular upper body clothing 3  -LM      Taking care of personal grooming (such as brushing teeth) 3  -LM      Eating meals 4  -LM      AM-PAC 6 Clicks Score (OT) 18  -LM         Functional Assessment    Outcome Measure Options AM-PAC 6 Clicks Daily Activity (OT)  -LM            User Key  (r) = Recorded By, (t) = Taken By, (c) = Cosigned By    Initials Name Provider Type    Татьяна Mccabe OT Occupational Therapist                Time Calculation:    Time Calculation- OT     Row Name 12/26/22 1510             Time Calculation- OT    Total Timed Code Minutes- OT 25 minute(s)  -LM            User Key  (r) = Recorded By, (t) = Taken By, (c) = Cosigned By    Initials Name Provider Type     Татьяна Bah OT Occupational Therapist              Therapy Charges for Today     Code Description Service Date Service Provider Modifiers Qty    10214396237  OT THER PROC EA 15 MIN 12/26/2022 Татьяна Bah OT GO 2               Татьяна Bah OT  12/26/2022    Electronically signed by Татьяна Bah OT at 12/26/22 1511     Discharge Planning Assessment   [facility]     Patient Name: Tre Kinney                    MRN: [id number]  Today's Date: 12/27/2022                   Admit Date: 10/5/2022             Discharge Plan      Row Name 12/27/22 0934             Plan      Plan Pt was admitted to swing bed on 10/5/22. Pt's insurance approved additional swing bed days through 12/28/22 per swing bed RN. Swing bed RN will submit updated clinicals to pt's insurance requesting additional days on 12/28/22. SS to follow.

## 2022-12-27 NOTE — CASE MANAGEMENT/SOCIAL WORK
Discharge Planning Assessment  [facility]     Patient Name: Tre Kinney  MRN: [id number]  Today's Date: 12/27/2022    Admit Date: 10/5/2022     Discharge Plan     Row Name 12/27/22 0934       Plan    Plan Pt was admitted to swing bed on 10/5/22. Pt's insurance approved additional swing bed days through 12/28/22 per swing bed RN. Swing bed RN will submit updated clinicals to pt's insurance requesting additional days on 12/28/22. SS to follow.              Continued Care and Services - Admitted Since 10/5/2022     Destination     Service Provider Request Status Selected Services Address Phone Fax Patient Preferred    [facility] Accepted N/A [address] [place] [address] [phone] --    [facility] Pending - Request Sent N/A [address] [facility] [address] [phone] --    [facility] Declined  Does not have a secondary payor source N/A [address] [place] [facility] [phone] --            Selected Continued Care - Prior Encounters Includes continued care and service providers with selected services from prior encounters from 7/7/2022 to 12/27/2022    Discharged on 10/5/2022 Admission date: 9/19/2022 - Discharge disposition: Swing Bed    Destination     Service Provider Selected Services Address Phone Fax Patient Preferred    [facility] SWING BED Skilled Nursing [address] [facility] [place] [address] [phone] -- --                    LENORE Koch

## 2022-12-27 NOTE — THERAPY TREATMENT NOTE
Acute Care - Physical Therapy Treatment Note   [facility]     Patient Name: Tre Kinney  : 1932  MRN: [id number]  Today's Date: 2022   Onset of Illness/Injury or Date of Surgery: 10/06/22  Visit Dx:   No diagnosis found.  Patient Active Problem List   Diagnosis   • Acid reflux   • Ankle arthralgia   • Cardiac conduction disorder   • Closed fracture of distal fibula   • Arteriosclerosis of coronary artery   • Fracture of distal end of tibia   • Elevated cholesterol   • BP (high blood pressure)   • Hypertrophic polyarthritis   • OP (osteoporosis)   • Right heart failure (HCC)   • Heart failure, chronic, right-sided (HCC)   • SOB (shortness of breath)   • Palpitations   • CHF (congestive heart failure) (HCC)   • Congestive heart failure (HCC)   • Precordial pain   • Chronic anticoagulation   • Hypokalemia   • Anemia, unspecified type   • Generalized weakness     Past Medical History:   Diagnosis Date   • Anemia    • Arthritis    • CHF (congestive heart failure) (HCC)    • Chronic kidney disease    • Coronary artery disease    • H/O blood clots    • History of transfusion     no reaction    • Hypertension      Past Surgical History:   Procedure Laterality Date   • ABDOMINAL SURGERY     • APPENDECTOMY     • CARDIAC SURGERY     •  SECTION     • COLONOSCOPY N/A 3/7/2018    Procedure: COLONOSCOPY;  Surgeon: Willie Gresham MD;  Location:  [facility];  Service:    • CORONARY ANGIOPLASTY WITH STENT PLACEMENT     • ENDOSCOPY N/A 3/6/2018    Procedure: ESOPHAGOGASTRODUODENOSCOPY;  Surgeon: Willie Gresham MD;  Location:  [facility];  Service:    • PACEMAKER IMPLANTATION      left side    • SKIN BIOPSY     • TUBAL ABDOMINAL LIGATION       PT Assessment (last 12 hours)     PT Evaluation and Treatment     Row Name 22 1407          Physical Therapy Time and Intention    Subjective Information complains of;fatigue  -CT     Document Type therapy note (daily note)  -CT     Mode of Treatment  physical therapy;individual therapy  -CT     Patient Effort good  -CT     Comment Pts treatment session [id number]. Pelon Dorsey via present via [employer]. Pt ambulated x 2 and performed seated ther ex.  -CT     Row Name 12/27/22 1407          General Information    Patient Profile Reviewed yes  -CT     Row Name 12/27/22 1407          Cognition    Affect/Mental Status (Cognition) WFL  -CT     Orientation Status (Cognition) oriented x 4  -CT     Follows Commands (Cognition) WFL  -CT     Row Name 12/27/22 1407          Bed Mobility    Comment, (Bed Mobility) pt up in chair  -CT     Row Name 12/27/22 1407          Transfers    Transfers sit-stand transfer;stand-sit transfer  -CT     Row Name 12/27/22 1407          Sit-Stand Transfer    Sit-Stand [place] (Transfers) minimum assist (75% patient effort);verbal cues  -CT     Assistive Device (Sit-Stand Transfers) walker, front-wheeled  -CT     Comment, (Sit-Stand Transfer) Min A from recliner, CGA from straight back chair  -CT     Row Name 12/27/22 1407          Stand-Sit Transfer    Stand-Sit [place] (Transfers) minimum assist (75% patient effort);moderate assist (50% patient effort)  -CT     Assistive Device (Stand-Sit Transfers) walker, front-wheeled  -CT     Row Name 12/27/22 1407          Gait/Stairs (Locomotion)    Marsteller Level (Gait) verbal cues;nonverbal cues (demo/gesture);minimum assist (75% patient effort);1 person to manage equipment;1 person assist  -CT     Assistive Device (Gait) walker, front-wheeled  -CT     Distance in Feet (Gait) 12 ft x 2  -CT     Pattern (Gait) step-to  -CT     Deviations/Abnormal Patterns (Gait) stride length decreased;gait speed decreased;festinating/shuffling;weight shifting decreased  -CT     Bilateral Gait Deviations weight shift ability decreased;forward flexed posture  -CT     Row Name 12/27/22 1407          Motor Skills    Therapeutic Exercise --  BLE seated ther ex: seated marches, LAQ, AP, hip abd/add, glut sets 20  reps  -CT     Row Name 12/27/22 1407          Coping    Observed Emotional State calm;cooperative;pleasant  -CT     Verbalized Emotional State acceptance  -CT     Row Name 12/27/22 1407          Plan of Care Review    Plan of Care Reviewed With patient  -CT     Row Name 12/27/22 1407          Positioning and Restraints    Pre-Treatment Position sitting in chair/recliner  -CT     Post Treatment Position chair  -CT     In Chair sitting;call light within reach;encouraged to call for assist  -CT           User Key  (r) = Recorded By, (t) = Taken By, (c) = Cosigned By    Initials Name Provider Type    CT Bhargavi Vásquez PT Physical Therapist                Physical Therapy Education     Title: PT OT SLP Therapies (Done)     Topic: Physical Therapy (Done)     Point: Mobility training (Done)     Learning Progress Summary           Patient Acceptance, E, VU by CL at 12/26/2022 1325    Acceptance, E, VU by CL at 12/22/2022 1826    Acceptance, E, VU by CL at 12/21/2022 1303    Acceptance, E, VU by EB at 12/20/2022 1644    Acceptance, E, VU by CL at 12/17/2022 1703    Acceptance, E,D, VU,NR by LL at 12/16/2022 1605    Acceptance, E,TB, VU by AC at 12/16/2022 1523    Acceptance, E,TB, VU by AC at 12/15/2022 1618    Acceptance, E,TB, VU by AC at 12/14/2022 1503    Acceptance, E,TB, VU by DL at 12/13/2022 0240    Acceptance, D,E, VU,NR by LL at 12/12/2022 1455    Acceptance, E,TB, VU by RW at 12/9/2022 1451    Acceptance, E,TB, VU by BC at 11/27/2022 1319    Acceptance, E,TB, VU by RM at 11/20/2022 1449    Acceptance, E,TB, VU by RM at 11/18/2022 1021    Acceptance, E,TB, VU by RH at 11/16/2022 1110    Acceptance, E,TB, VU by KK at 11/14/2022 1638    Acceptance, E,TB, VU by KK at 11/13/2022 1648    Acceptance, E,TB, VU by KG at 11/10/2022 1827    Acceptance, E,TB, VU by DM at 11/9/2022 1638    Acceptance, E,TB, VU by KK at 11/8/2022 1754    Acceptance, E,TB, VU by KK at 11/7/2022 1635    Acceptance, E,D, VU,NR by LL at 11/5/2022  1325    Acceptance, E, VU by EB at 11/4/2022 1448    Acceptance, E,TB, VU by KM at 11/4/2022 1101   Family Acceptance, D,E, VU,NR by LL at 12/12/2022 1455                   Point: Home exercise program (Done)     Learning Progress Summary           Patient Acceptance, E, VU by CL at 12/26/2022 1325    Acceptance, E, VU by CL at 12/22/2022 1826    Acceptance, E, VU by CL at 12/21/2022 1303    Acceptance, E, VU by EB at 12/20/2022 1644    Acceptance, E, VU by CL at 12/17/2022 1703    Acceptance, E,D, VU,NR by LL at 12/16/2022 1605    Acceptance, E,TB, VU by AC at 12/16/2022 1523    Acceptance, E,TB, VU by AC at 12/15/2022 1618    Acceptance, E,TB, VU by AC at 12/14/2022 1503    Acceptance, E,TB, VU by DL at 12/13/2022 0240    Acceptance, D,E, VU,NR by LL at 12/12/2022 1455    Acceptance, E,TB, VU by RW at 12/9/2022 1451    Acceptance, E,TB, VU by BC at 11/27/2022 1319    Acceptance, E,TB, VU by RM at 11/20/2022 1449    Acceptance, E,TB, VU by RM at 11/18/2022 1021    Acceptance, E,TB, VU by RH at 11/16/2022 1110    Acceptance, E,TB, VU by KK at 11/14/2022 1638    Acceptance, E,TB, VU by KK at 11/13/2022 1648    Acceptance, E,TB, VU by KG at 11/10/2022 1827    Acceptance, E,TB, VU by DM at 11/9/2022 1638    Acceptance, E,TB, VU by KK at 11/8/2022 1754    Acceptance, E,TB, VU by KK at 11/7/2022 1635    Acceptance, E,D, VU,NR by LL at 11/5/2022 1325    Acceptance, E, VU by EB at 11/4/2022 1448    Acceptance, E,TB, VU by KM at 11/4/2022 1101   Family Acceptance, D,E, VU,NR by LL at 12/12/2022 1455                   Point: Body mechanics (Done)     Learning Progress Summary           Patient Acceptance, E, VU by CL at 12/26/2022 1325    Acceptance, E, VU by CL at 12/22/2022 1826    Acceptance, E, VU by CL at 12/21/2022 1303    Acceptance, E, VU by EB at 12/20/2022 1644    Acceptance, E, VU by CL at 12/17/2022 1703    Acceptance, E,D, VU,NR by LL at 12/16/2022 1605    Acceptance, E,TB, VU by AC at 12/16/2022 1523     Acceptance, E,TB, VU by AC at 12/15/2022 1618    Acceptance, E,TB, VU by AC at 12/14/2022 1503    Acceptance, E,TB, VU by DL at 12/13/2022 0240    Acceptance, D,E, VU,NR by LL at 12/12/2022 1455    Acceptance, E,TB, VU by RW at 12/9/2022 1451    Acceptance, E,TB, VU by BC at 11/27/2022 1319    Acceptance, E,TB, VU by RM at 11/20/2022 1449    Acceptance, E,TB, VU by RM at 11/18/2022 1021    Acceptance, E,TB, VU by RH at 11/16/2022 1110    Acceptance, E,TB, VU by KK at 11/14/2022 1638    Acceptance, E,TB, VU by KK at 11/13/2022 1648    Acceptance, E,TB, VU by KG at 11/10/2022 1827    Acceptance, E,TB, VU by DM at 11/9/2022 1638    Acceptance, E,TB, VU by KK at 11/8/2022 1754    Acceptance, E,TB, VU by KK at 11/7/2022 1635    Acceptance, E,D, VU,NR by LL at 11/5/2022 1325    Acceptance, E, VU by EB at 11/4/2022 1448    Acceptance, E,TB, VU by KM at 11/4/2022 1101   Family Acceptance, D,E, VU,NR by LL at 12/12/2022 1455                   Point: Precautions (Done)     Learning Progress Summary           Patient Acceptance, E, VU by CL at 12/26/2022 1325    Acceptance, E, VU by CL at 12/22/2022 1826    Acceptance, E, VU by CL at 12/21/2022 1303    Acceptance, E, VU by EB at 12/20/2022 1644    Acceptance, E, VU by CL at 12/17/2022 1703    Acceptance, E,D, VU,NR by LL at 12/16/2022 1605    Acceptance, E,TB, VU by AC at 12/16/2022 1523    Acceptance, E,TB, VU by AC at 12/15/2022 1618    Acceptance, E,TB, VU by AC at 12/14/2022 1503    Acceptance, E,TB, VU by DL at 12/13/2022 0240    Acceptance, D,E, VU,NR by LL at 12/12/2022 1455    Acceptance, E,TB, VU by RW at 12/9/2022 1451    Acceptance, E,TB, VU by BC at 11/27/2022 1319    Acceptance, E,TB, VU by RM at 11/20/2022 1449    Acceptance, E,TB, VU by RM at 11/18/2022 1021    Acceptance, E,TB, VU by RH at 11/16/2022 1110    Acceptance, E,TB, VU by KK at 11/14/2022 1638    Acceptance, E,TB, VU by KK at 11/13/2022 1648    Acceptance, E,TB, VU by KG at 11/10/2022 1827     Acceptance, E,TB, VU by DM at 11/9/2022 1638    Acceptance, E,TB, VU by KK at 11/8/2022 1754    Acceptance, E,TB, VU by KK at 11/7/2022 1635    Acceptance, E,D, VU,NR by LL at 11/5/2022 1325    Acceptance, E, VU by EB at 11/4/2022 1448    Acceptance, E,TB, VU by KM at 11/4/2022 1101   Family Acceptance, D,E, VU,NR by LL at 12/12/2022 1455                               User Key     Initials Effective Dates Name Provider Type Discipline    CL 06/16/21 -  Mitzy Mustafa, RN Registered Nurse Nurse    LL 05/02/16 -  Neda Garcia PTA Physical Therapist Assistant PT    RM 06/14/22 -  Bethany Krause, RN Registered Nurse Nurse    DM 06/16/21 -  Moniz, Deborah, RN Registered Nurse Nurse    BC 08/02/22 -  Collett, Brittany, RN Registered Nurse Nurse    AC 10/13/22 -  Jaya Castillo RN Registered Nurse Nurse    KM 05/24/22 -  Mao Vázquez, EDEL Physical Therapist PT    EB 09/22/22 -  Cedric Oliver, RN Registered Nurse Nurse    KK 06/27/22 -  Chelsi Bolivar, RN Registered Nurse Nurse    DL 03/16/22 -  Francy Garcia, RN Registered Nurse Nurse    KG 09/22/22 -  Verónica Rodriguez, RN Registered Nurse Nurse    RW 09/22/22 -  Bethany Schofield, RN Registered Nurse Nurse    RH 08/03/22 - 12/21/22 Jung Russell, SONAM Extern Registered Nurse Nurse              PT Recommendation and Plan  Anticipated Discharge Disposition (PT): home with 24/7 care, skilled nursing facility  Planned Therapy Interventions (PT): balance training, bed mobility training, gait training, home exercise program, manual therapy techniques, motor coordination training, neuromuscular re-education, patient/family education, postural re-education, strengthening, transfer training  Therapy Frequency (PT): 5 times/wk (5-6 times/wk)  Progress Summary (PT)  Progress Toward Functional Goals (PT): progress toward functional goals is gradual  Plan of Care Reviewed With: patient  Progress: improving   Outcome Measures     Row Name 12/26/22 1500             How much  help from another is currently needed...    Putting on and taking off regular lower body clothing? 2  -LM      Bathing (including washing, rinsing, and drying) 3  -LM      Toileting (which includes using toilet bed pan or urinal) 3  -LM      Putting on and taking off regular upper body clothing 3  -LM      Taking care of personal grooming (such as brushing teeth) 3  -LM      Eating meals 4  -LM      AM-PAC 6 Clicks Score (OT) 18  -LM         Functional Assessment    Outcome Measure Options AM-PAC 6 Clicks Daily Activity (OT)  -LM            User Key  (r) = Recorded By, (t) = Taken By, (c) = Cosigned By    Initials Name Provider Type    LM Татьяна Bah OT Occupational Therapist                 Time Calculation:    PT Charges     Row Name 12/27/22 1415             Time Calculation    Start Time 1255  -CT      Stop Time 1330  -CT      Time Calculation (min) 35 min  -CT      PT Received On 12/27/22  -CT            User Key  (r) = Recorded By, (t) = Taken By, (c) = Cosigned By    Initials Name Provider Type    CT Bhargavi Vásquez, PT Physical Therapist              Therapy Charges for Today     Code Description Service Date Service Provider Modifiers Qty    59746642769 HC PT THER PROC EA 15 MIN 12/26/2022 Bhargavi Vásquez, PT GP 1    91857488819 HC GAIT TRAINING EA 15 MIN 12/26/2022 Bhargavi Vásquez, PT GP 1    58644320097 HC GAIT TRAINING EA 15 MIN 12/27/2022 Bhargavi Vásquez, PT GP 1    04854433176 HC PT THER PROC EA 15 MIN 12/27/2022 Bhargavi Vásquez, PT GP 1          PT G-Codes  Outcome Measure Options: AM-PAC 6 Clicks Daily Activity (OT)  AM-PAC 6 Clicks Score (PT): 14  AM-PAC 6 Clicks Score (OT): 18    Bhargavi Vásquez PT  12/27/2022

## 2022-12-27 NOTE — PLAN OF CARE
Goal Outcome Evaluation:  Plan of Care Reviewed With: patient        Progress: improving  Outcome Evaluation: Pt sitting in chair per her request. Pt got up to the bedside. No acute changes. Will continue plan of care.

## 2022-12-27 NOTE — PLAN OF CARE
Goal Outcome Evaluation:  Plan of Care Reviewed With: patient        Progress: no change  Outcome Evaluation: Pt has rested in bed overnight. VSS. No acute changes overnight.

## 2022-12-27 NOTE — THERAPY TREATMENT NOTE
Acute Care - Occupational Therapy Treatment Note   [place]ot     Patient Name: Tre Kinney  : 1932  MRN: [id number]  Today's Date: 2022  Onset of Illness/Injury or Date of Surgery: 10/06/22     Referring Physician: Janneth    Admit Date: 10/5/2022     No diagnosis found.  Patient Active Problem List   Diagnosis   • Acid reflux   • Ankle arthralgia   • Cardiac conduction disorder   • Closed fracture of distal fibula   • Arteriosclerosis of coronary artery   • Fracture of distal end of tibia   • Elevated cholesterol   • BP (high blood pressure)   • Hypertrophic polyarthritis   • OP (osteoporosis)   • Right heart failure (HCC)   • Heart failure, chronic, right-sided (HCC)   • SOB (shortness of breath)   • Palpitations   • CHF (congestive heart failure) (HCC)   • Congestive heart failure (HCC)   • Precordial pain   • Chronic anticoagulation   • Hypokalemia   • Anemia, unspecified type   • Generalized weakness     Past Medical History:   Diagnosis Date   • Anemia    • Arthritis    • CHF (congestive heart failure) ([facility])    • Chronic kidney disease    • Coronary artery disease    • H/O blood clots    • History of transfusion     no reaction    • Hypertension      Past Surgical History:   Procedure Laterality Date   • ABDOMINAL SURGERY     • APPENDECTOMY     • CARDIAC SURGERY     •  SECTION     • COLONOSCOPY N/A 3/7/2018    Procedure: COLONOSCOPY;  Surgeon: Willie Gresham MD;  Location:  [facility];  Service:    • CORONARY ANGIOPLASTY WITH STENT PLACEMENT     • ENDOSCOPY N/A 3/6/2018    Procedure: ESOPHAGOGASTRODUODENOSCOPY;  Surgeon: Willie Gresham MD;  Location:  [facility];  Service:    • PACEMAKER IMPLANTATION      left side    • SKIN BIOPSY     • TUBAL ABDOMINAL LIGATION           OT ASSESSMENT FLOWSHEET (last 12 hours)     OT Evaluation and Treatment     Row Name 22 1504                   OT Time and Intention    Subjective Information no complaints  -KP        Document Type  therapy note (daily note)  -KP        Mode of Treatment individual therapy;occupational therapy  -KP        Total Minutes, Occupational Therapy 35  -KP        Patient Effort good  -KP        Symptoms Noted During/After Treatment fatigue  -KP        Comment Patient seen for OT treatment session 12:55 to 13:30 on this date. PT present as second person neede for safety during functional mobility (consistent improvement noted and may not need second person with separate session to maximize rehab potential - discussed with patient and family). Fair activity tolerance, with fatigue noted during exertion on this date.  -           General Information    Patient Profile Reviewed yes  -KP        General Observations of Patient alert/cooperative  -KP        Existing Precautions/Restrictions fall  -KP        Limitations/Impairments safety/cognitive  -KP        Equipment Issued to Patient gait belt  -           Pain Assessment    Pretreatment Pain Rating 0/10 - no pain  -KP        Posttreatment Pain Rating 0/10 - no pain  -KP           Cognition    Affect/Mental Status (Cognition) [facility]  -        Orientation Status (Cognition) oriented x 4  -KP        Follows Commands (Cognition) WFL  -           Transfer Assessment/Treatment    Transfers sit-stand transfer;stand-sit transfer  -        Comment, (Transfers) X1 attempt for functional mobility with PT  -KP           Chair-Bed Transfer    Chair-Bed [place] (Transfers) minimum assist (75% patient effort);moderate assist (50% patient effort);1 person assist  -        Assistive Device (Chair-Bed Transfers) walker, front-wheeled  -KP           Sit-Stand Transfer    Sit-Stand [place] (Transfers) minimum assist (75% patient effort);verbal cues  -        Assistive Device (Sit-Stand Transfers) walker, front-wheeled  -           Motor Skills    Functional Endurance fair  -KP        Therapeutic Exercise other (see comments)  BUE exercise through functional movement  pattern with red resistance theraband X20 reps X2 sets (did not perform shoulder flexion overhead on this date due to fatigue); seated up in recliner  -           Plan of Care Review    Plan of Care Reviewed With patient  -KP        Progress improving  -KP           Positioning and Restraints    Pre-Treatment Position sitting in chair/recliner  -KP        In Chair sitting;call light within reach  -KP              User Key  (r) = Recorded By, (t) = Taken By, (c) = Cosigned By    Initials Name Effective Dates     Linn Quevedo OT 06/16/21 -                        OT Recommendation and Plan     Progress Toward Functional Goals (OT): progress toward functional goals is fair  Plan of Care Review  Plan of Care Reviewed With: patient  Progress: improving  Outcome Evaluation: Patient seen for OT treatment session 8:55 to 9:44. Initial session addressed functional transfers/mobility with PT present. Then OT addressed UE strength, endurance, and ROM through seated exercises. She then was seen for ADL retraining as documented for toileting. Continue plan of care.  Plan of Care Reviewed With: patient  Outcome Evaluation: Patient seen for OT treatment session 8:55 to 9:44. Initial session addressed functional transfers/mobility with PT present. Then OT addressed UE strength, endurance, and ROM through seated exercises. She then was seen for ADL retraining as documented for toileting. Continue plan of care.     Outcome Measures     Row Name 12/27/22 1509 12/26/22 1500          How much help from another is currently needed...    Putting on and taking off regular lower body clothing? 3  -KP 2  -LM     Bathing (including washing, rinsing, and drying) 3  -KP 3  -LM     Toileting (which includes using toilet bed pan or urinal) 3  -KP 3  -LM     Putting on and taking off regular upper body clothing 3  -KP 3  -LM     Taking care of personal grooming (such as brushing teeth) 3  -KP 3  -LM     Eating meals 4  -KP 4  -LM     AM-PAC  6 Clicks Score (OT) 19  -KP 18  -LM        Functional Assessment    Outcome Measure Options -- AM-PAC 6 Clicks Daily Activity (OT)  -LM           User Key  (r) = Recorded By, (t) = Taken By, (c) = Cosigned By    Initials Name Provider Type     Татьяна Bah OT Occupational Therapist     Linn Quevedo OT Occupational Therapist                Time Calculation:    Time Calculation- OT     Row Name 12/27/22 1509             Time Calculation- OT    OT Start Time 1255  -      OT Stop Time 1330  -      OT Time Calculation (min) 35 min  -      Total Timed Code Minutes- OT 35 minute(s)  -      OT Received On 12/27/22  -         Timed Charges    14009 - OT Therapeutic Exercise Minutes 12  -KP      25366 - OT Therapeutic Activity Minutes 5  -KP         Total Minutes    Timed Charges Total Minutes 17  -       Total Minutes 17  -KP            User Key  (r) = Recorded By, (t) = Taken By, (c) = Cosigned By    Initials Name Provider Type     Linn Quevedo OT Occupational Therapist              Therapy Charges for Today     Code Description Service Date Service Provider Modifiers Qty    73540775691  OT THER PROC EA 15 MIN 12/27/2022 Linn Quevedo OT GO 1               Linn Quevedo OT  12/27/2022

## 2022-12-27 NOTE — PROGRESS NOTES
Nutrition Services    Patient Name:  Tre Kinney  YOB: 1932  MRN: [id number]  Admit Date:  10/5/2022    BMI: 26.62  Diet: Regular  Intake: 65% x 12 meals  Fluid: not meeting needs     Electronically signed by:  Jose Oneal RD  12/27/22 10:26 EST

## 2022-12-27 NOTE — PROGRESS NOTES
Patient currently in swing bed for additional therapy in the setting of generalized weakness and debility.  Patient not seen and examined today.  Patient initially admitted to swing bed on 10/5 and approved for additional swing bed days through 12/28.  We will continue with PT and OT.  Discussed with patient's daytime nurse Ladan who reports patient without any acute issues or events.  Patient to be continued on Eliquis for treatment of DVT.

## 2022-12-28 PROCEDURE — 97116 GAIT TRAINING THERAPY: CPT

## 2022-12-28 PROCEDURE — 97535 SELF CARE MNGMENT TRAINING: CPT

## 2022-12-28 PROCEDURE — 97110 THERAPEUTIC EXERCISES: CPT

## 2022-12-28 PROCEDURE — 97530 THERAPEUTIC ACTIVITIES: CPT

## 2022-12-28 RX ADMIN — PANTOPRAZOLE SODIUM 40 MG: 40 TABLET, DELAYED RELEASE ORAL at 05:43

## 2022-12-28 RX ADMIN — Medication 10 MG: at 20:08

## 2022-12-28 RX ADMIN — FUROSEMIDE 40 MG: 40 TABLET ORAL at 17:20

## 2022-12-28 RX ADMIN — DICLOFENAC 4 G: 10 GEL TOPICAL at 17:20

## 2022-12-28 RX ADMIN — HYDROCODONE BITARTRATE AND ACETAMINOPHEN 1 TABLET: 7.5; 325 TABLET ORAL at 23:41

## 2022-12-28 RX ADMIN — HYDRALAZINE HYDROCHLORIDE 5 MG: 25 TABLET, FILM COATED ORAL at 13:59

## 2022-12-28 RX ADMIN — HYDRALAZINE HYDROCHLORIDE 5 MG: 25 TABLET, FILM COATED ORAL at 05:43

## 2022-12-28 RX ADMIN — DOCUSATE SODIUM 100 MG: 100 CAPSULE ORAL at 08:14

## 2022-12-28 RX ADMIN — ESCITALOPRAM 10 MG: 10 TABLET, FILM COATED ORAL at 08:14

## 2022-12-28 RX ADMIN — APIXABAN 5 MG: 5 TABLET, FILM COATED ORAL at 08:14

## 2022-12-28 RX ADMIN — FUROSEMIDE 40 MG: 40 TABLET ORAL at 08:14

## 2022-12-28 RX ADMIN — APIXABAN 5 MG: 5 TABLET, FILM COATED ORAL at 20:08

## 2022-12-28 RX ADMIN — DICLOFENAC 4 G: 10 GEL TOPICAL at 20:09

## 2022-12-28 RX ADMIN — DOCUSATE SODIUM 100 MG: 100 CAPSULE ORAL at 20:09

## 2022-12-28 RX ADMIN — METOPROLOL TARTRATE 25 MG: 25 TABLET, FILM COATED ORAL at 20:08

## 2022-12-28 RX ADMIN — HYDRALAZINE HYDROCHLORIDE 5 MG: 25 TABLET, FILM COATED ORAL at 22:14

## 2022-12-28 RX ADMIN — MAGNESIUM GLUCONATE 500 MG ORAL TABLET 400 MG: 500 TABLET ORAL at 08:14

## 2022-12-28 RX ADMIN — METOPROLOL TARTRATE 25 MG: 25 TABLET, FILM COATED ORAL at 08:14

## 2022-12-28 RX ADMIN — Medication 1 TABLET: at 08:14

## 2022-12-28 RX ADMIN — GABAPENTIN 300 MG: 300 CAPSULE ORAL at 20:20

## 2022-12-28 RX ADMIN — TAMSULOSIN HYDROCHLORIDE 0.4 MG: 0.4 CAPSULE ORAL at 08:14

## 2022-12-28 NOTE — THERAPY TREATMENT NOTE
Acute Care - Physical Therapy Treatment Note   [place]     Patient Name: Tre Kinney  : 1932  MRN: [id number]  Today's Date: 2022   Onset of Illness/Injury or Date of Surgery: 10/06/22  Visit Dx:   No diagnosis found.  Patient Active Problem List   Diagnosis   • Acid reflux   • Ankle arthralgia   • Cardiac conduction disorder   • Closed fracture of distal fibula   • Arteriosclerosis of coronary artery   • Fracture of distal end of tibia   • Elevated cholesterol   • BP (high blood pressure)   • Hypertrophic polyarthritis   • OP (osteoporosis)   • Right heart failure (HCC)   • Heart failure, chronic, right-sided (HCC)   • SOB (shortness of breath)   • Palpitations   • CHF (congestive heart failure) (HCC)   • Congestive heart failure (HCC)   • Precordial pain   • Chronic anticoagulation   • Hypokalemia   • Anemia, unspecified type   • Generalized weakness     Past Medical History:   Diagnosis Date   • Anemia    • Arthritis    • CHF (congestive heart failure) (HCC)    • Chronic kidney disease    • Coronary artery disease    • H/O blood clots    • History of transfusion     no reaction    • Hypertension      Past Surgical History:   Procedure Laterality Date   • ABDOMINAL SURGERY     • APPENDECTOMY     • CARDIAC SURGERY     •  SECTION     • COLONOSCOPY N/A 3/7/2018    Procedure: COLONOSCOPY;  Surgeon: Willie Gresham MD;  Location:  [facility];  Service:    • CORONARY ANGIOPLASTY WITH STENT PLACEMENT     • ENDOSCOPY N/A 3/6/2018    Procedure: ESOPHAGOGASTRODUODENOSCOPY;  Surgeon: Willie Gresham MD;  Location:  [facility];  Service:    • PACEMAKER IMPLANTATION      left side    • SKIN BIOPSY     • TUBAL ABDOMINAL LIGATION       PT Assessment (last 12 hours)     PT Evaluation and Treatment     Row Name 22 1255          Physical Therapy Time and Intention    Subjective Information no complaints  -CT     Document Type therapy note (daily note)  -CT     Mode of Treatment physical  therapy;individual therapy  -CT     Patient Effort good  -CT     Comment Pts treatment session [id number]. Pts son Willian present via [employer]. Pt ambulated x 2 and performed seated and standing ther ex.  -CT     Row Name 12/28/22 1255          General Information    Patient Profile Reviewed yes  -CT     Row Name 12/28/22 1255          Cognition    Affect/Mental Status (Cognition) WFL  -CT     Orientation Status (Cognition) oriented x 4  -CT     Follows Commands (Cognition) WFL  -CT     Row Name 12/28/22 1255          Transfers    Transfers sit-stand transfer;stand-sit transfer  -CT     Row Name 12/28/22 1255          Sit-Stand Transfer    Sit-Stand [place] (Transfers) minimum assist (75% patient effort);verbal cues  -CT     Assistive Device (Sit-Stand Transfers) walker, front-wheeled  -CT     Row Name 12/28/22 1255          Stand-Sit Transfer    Stand-Sit [place] (Transfers) minimum assist (75% patient effort)  -CT     Assistive Device (Stand-Sit Transfers) walker, front-wheeled  -CT     Row Name 12/28/22 1255          Gait/Stairs (Locomotion)    Jennings Level (Gait) verbal cues;nonverbal cues (demo/gesture);minimum assist (75% patient effort);1 person assist  -CT     Assistive Device (Gait) walker, front-wheeled  -CT     Distance in Feet (Gait) 20 x 2  -CT     Pattern (Gait) step-to  -CT     Deviations/Abnormal Patterns (Gait) stride length decreased;gait speed decreased;festinating/shuffling;weight shifting decreased  -CT     Bilateral Gait Deviations weight shift ability decreased;forward flexed posture  -CT     Row Name 12/28/22 1255          Motor Skills    Therapeutic Exercise --  seated ther ex: seated marches, LAQ, AP, hip abd/add, glut sets 25 reps; standing ther ex: stnading endurance 60 secs, standing marches 15 reps, mini squats 15 reps  -CT     Row Name 12/28/22 1255          Coping    Observed Emotional State calm;cooperative;pleasant  -CT     Verbalized Emotional State acceptance  -CT      Row Name 12/28/22 1255          Plan of Care Review    Plan of Care Reviewed With patient  -CT     Row Name 12/28/22 1255          Positioning and Restraints    Pre-Treatment Position sitting in chair/recliner  -CT     Post Treatment Position chair  -CT     In Chair sitting;call light within reach;encouraged to call for assist  -CT           User Key  (r) = Recorded By, (t) = Taken By, (c) = Cosigned By    Initials Name Provider Type    CT Bhargavi Vásquez PT Physical Therapist                Physical Therapy Education     Title: PT OT SLP Therapies (Done)     Topic: Physical Therapy (Done)     Point: Mobility training (Done)     Learning Progress Summary           Patient Acceptance, E,TB, VU by AC at 12/27/2022 1513    Acceptance, E, VU by CL at 12/26/2022 1325    Acceptance, E, VU by CL at 12/22/2022 1826    Acceptance, E, VU by CL at 12/21/2022 1303    Acceptance, E, VU by EB at 12/20/2022 1644    Acceptance, E, VU by CL at 12/17/2022 1703    Acceptance, E,D, VU,NR by LL at 12/16/2022 1605    Acceptance, E,TB, VU by AC at 12/16/2022 1523    Acceptance, E,TB, VU by AC at 12/15/2022 1618    Acceptance, E,TB, VU by AC at 12/14/2022 1503    Acceptance, E,TB, VU by DL at 12/13/2022 0240    Acceptance, D,E, VU,NR by LL at 12/12/2022 1455    Acceptance, E,TB, VU by RW at 12/9/2022 1451    Acceptance, E,TB, VU by BC at 11/27/2022 1319    Acceptance, E,TB, VU by RM at 11/20/2022 1449    Acceptance, E,TB, VU by RM at 11/18/2022 1021    Acceptance, E,TB, VU by RH at 11/16/2022 1110    Acceptance, E,TB, VU by KK at 11/14/2022 1638    Acceptance, E,TB, VU by KK at 11/13/2022 1648    Acceptance, E,TB, VU by KG at 11/10/2022 1827    Acceptance, E,TB, VU by DM at 11/9/2022 1638    Acceptance, E,TB, VU by KK at 11/8/2022 1754    Acceptance, E,TB, VU by KK at 11/7/2022 1635    Acceptance, E,D, VU,NR by LL at 11/5/2022 1325    Acceptance, E, VU by EB at 11/4/2022 1448    Acceptance, E,TB, VU by KM at 11/4/2022 1101   Family  Acceptance, D,E, VU,NR by LL at 12/12/2022 1455                   Point: Home exercise program (Done)     Learning Progress Summary           Patient Acceptance, E,TB, VU by AC at 12/27/2022 1513    Acceptance, E, VU by CL at 12/26/2022 1325    Acceptance, E, VU by CL at 12/22/2022 1826    Acceptance, E, VU by CL at 12/21/2022 1303    Acceptance, E, VU by EB at 12/20/2022 1644    Acceptance, E, VU by CL at 12/17/2022 1703    Acceptance, E,D, VU,NR by LL at 12/16/2022 1605    Acceptance, E,TB, VU by AC at 12/16/2022 1523    Acceptance, E,TB, VU by AC at 12/15/2022 1618    Acceptance, E,TB, VU by AC at 12/14/2022 1503    Acceptance, E,TB, VU by DL at 12/13/2022 0240    Acceptance, D,E, VU,NR by LL at 12/12/2022 1455    Acceptance, E,TB, VU by RW at 12/9/2022 1451    Acceptance, E,TB, VU by BC at 11/27/2022 1319    Acceptance, E,TB, VU by RM at 11/20/2022 1449    Acceptance, E,TB, VU by RM at 11/18/2022 1021    Acceptance, E,TB, VU by RH at 11/16/2022 1110    Acceptance, E,TB, VU by KK at 11/14/2022 1638    Acceptance, E,TB, VU by KK at 11/13/2022 1648    Acceptance, E,TB, VU by KG at 11/10/2022 1827    Acceptance, E,TB, VU by DM at 11/9/2022 1638    Acceptance, E,TB, VU by KK at 11/8/2022 1754    Acceptance, E,TB, VU by KK at 11/7/2022 1635    Acceptance, E,D, VU,NR by LL at 11/5/2022 1325    Acceptance, E, VU by EB at 11/4/2022 1448    Acceptance, E,TB, VU by KM at 11/4/2022 1101   Family Acceptance, D,E, VU,NR by LL at 12/12/2022 1455                   Point: Body mechanics (Done)     Learning Progress Summary           Patient Acceptance, E,TB, VU by AC at 12/27/2022 1513    Acceptance, E, VU by CL at 12/26/2022 1325    Acceptance, E, VU by CL at 12/22/2022 1826    Acceptance, E, VU by CL at 12/21/2022 1303    Acceptance, E, VU by EB at 12/20/2022 1644    Acceptance, E, VU by CL at 12/17/2022 1703    Acceptance, E,D, VU,NR by LL at 12/16/2022 1605    Acceptance, E,TB, VU by AC at 12/16/2022 1523    Acceptance,  E,TB, VU by AC at 12/15/2022 1618    Acceptance, E,TB, VU by AC at 12/14/2022 1503    Acceptance, E,TB, VU by DL at 12/13/2022 0240    Acceptance, D,E, VU,NR by LL at 12/12/2022 1455    Acceptance, E,TB, VU by RW at 12/9/2022 1451    Acceptance, E,TB, VU by BC at 11/27/2022 1319    Acceptance, E,TB, VU by RM at 11/20/2022 1449    Acceptance, E,TB, VU by RM at 11/18/2022 1021    Acceptance, E,TB, VU by RH at 11/16/2022 1110    Acceptance, E,TB, VU by KK at 11/14/2022 1638    Acceptance, E,TB, VU by KK at 11/13/2022 1648    Acceptance, E,TB, VU by KG at 11/10/2022 1827    Acceptance, E,TB, VU by DM at 11/9/2022 1638    Acceptance, E,TB, VU by KK at 11/8/2022 1754    Acceptance, E,TB, VU by KK at 11/7/2022 1635    Acceptance, E,D, VU,NR by LL at 11/5/2022 1325    Acceptance, E, VU by EB at 11/4/2022 1448    Acceptance, E,TB, VU by KM at 11/4/2022 1101   Family Acceptance, D,E, VU,NR by LL at 12/12/2022 1455                   Point: Precautions (Done)     Learning Progress Summary           Patient Acceptance, E,TB, VU by AC at 12/27/2022 1513    Acceptance, E, VU by CL at 12/26/2022 1325    Acceptance, E, VU by CL at 12/22/2022 1826    Acceptance, E, VU by CL at 12/21/2022 1303    Acceptance, E, VU by EB at 12/20/2022 1644    Acceptance, E, VU by CL at 12/17/2022 1703    Acceptance, E,D, VU,NR by LL at 12/16/2022 1605    Acceptance, E,TB, VU by AC at 12/16/2022 1523    Acceptance, E,TB, VU by AC at 12/15/2022 1618    Acceptance, E,TB, VU by AC at 12/14/2022 1503    Acceptance, E,TB, VU by DL at 12/13/2022 0240    Acceptance, D,E, VU,NR by LL at 12/12/2022 1455    Acceptance, E,TB, VU by RW at 12/9/2022 1451    Acceptance, E,TB, VU by BC at 11/27/2022 1319    Acceptance, E,TB, VU by RM at 11/20/2022 1449    Acceptance, E,TB, VU by RM at 11/18/2022 1021    Acceptance, E,TB, VU by RH at 11/16/2022 1110    Acceptance, E,TB, VU by KK at 11/14/2022 1638    Acceptance, E,TB, VU by KK at 11/13/2022 1648    Acceptance, E,TB, VU  by KG at 11/10/2022 1827    Acceptance, E,TB, VU by DM at 11/9/2022 1638    Acceptance, E,TB, VU by KK at 11/8/2022 1754    Acceptance, E,TB, VU by KK at 11/7/2022 1635    Acceptance, E,D, VU,NR by LL at 11/5/2022 1325    Acceptance, E, VU by EB at 11/4/2022 1448    Acceptance, E,TB, VU by KM at 11/4/2022 1101   Family Acceptance, D,E, VU,NR by LL at 12/12/2022 1455                               User Key     Initials Effective Dates Name Provider Type Discipline    CL 06/16/21 -  Mitzy Mustafa, RN Registered Nurse Nurse    LL 05/02/16 -  Neda Garcia PTA Physical Therapist Assistant PT    RM 06/14/22 -  Bethany Krause, RN Registered Nurse Nurse    DM 06/16/21 -  Moniz, Deborah, RN Registered Nurse Nurse    BC 08/02/22 -  Collett, Brittany, RN Registered Nurse Nurse    AC 10/13/22 -  Jaya Castillo RN Registered Nurse Nurse    KM 05/24/22 -  Mao Vázquez, PT Physical Therapist PT    EB 09/22/22 -  Cedric Oliver, RN Registered Nurse Nurse    KK 06/27/22 -  Chelsi Bolivar, RN Registered Nurse Nurse    DL 03/16/22 -  Francy Garcia, RN Registered Nurse Nurse    KG 09/22/22 -  Verónica Rodriguez, RN Registered Nurse Nurse    RW 09/22/22 -  Bethany Schofield, RN Registered Nurse Nurse    RH 08/03/22 - 12/21/22 Jung Russell, SONAM Extern Registered Nurse Nurse              PT Recommendation and Plan  Anticipated Discharge Disposition (PT): home with 24/7 [facility], skilled nursing facility  Planned Therapy Interventions (PT): balance training, bed mobility training, gait training, home exercise program, manual therapy techniques, motor coordination training, neuromuscular re-education, patient/family education, postural re-education, strengthening, transfer training  Therapy Frequency (PT): 5 times/wk (5-6 times/wk)  Progress Summary (PT)  Progress Toward Functional Goals (PT): progress toward functional goals is gradual  Plan of Care Reviewed With: patient  Progress: improving   Outcome Measures     Row Name 12/27/22  1509 12/26/22 1500          How much help from another is currently needed...    Putting on and taking off regular lower body clothing? 3  -KP 2  -LM     Bathing (including washing, rinsing, and drying) 3  -KP 3  -LM     Toileting (which includes using toilet bed pan or urinal) 3  -KP 3  -LM     Putting on and taking off regular upper body clothing 3  -KP 3  -LM     Taking care of personal grooming (such as brushing teeth) 3  -KP 3  -LM     Eating meals 4  -KP 4  -LM     AM-PAC 6 Clicks Score (OT) 19  -KP 18  -LM        Functional Assessment    Outcome Measure Options -- AM-PAC 6 Clicks Daily Activity (OT)  -LM           User Key  (r) = Recorded By, (t) = Taken By, (c) = Cosigned By    Initials Name Provider Type    LM Татьяна Bah, OT Occupational Therapist    KP Linn Quevedo, VELASQUEZ Occupational Therapist                 Time Calculation:    PT Charges     Row Name 12/28/22 1419             Time Calculation    Start Time 1255  -CT      Stop Time 1325  -CT      Time Calculation (min) 30 min  -CT      PT Received On 12/28/22  -CT            User Key  (r) = Recorded By, (t) = Taken By, (c) = Cosigned By    Initials Name Provider Type    CT Bhargavi Vásquez, PT Physical Therapist              Therapy Charges for Today     Code Description Service Date Service Provider Modifiers Qty    89816774901 HC GAIT TRAINING EA 15 MIN 12/27/2022 Bhargavi Vásquez, PT GP 1    43122148924 HC PT THER PROC EA 15 MIN 12/27/2022 Bhargavi Vásquez, PT GP 1    64871360380 HC GAIT TRAINING EA 15 MIN 12/28/2022 Bhargavi Vásquez, PT GP 1    63389341846 HC PT THER PROC EA 15 MIN 12/28/2022 Bhargavi Vásquez, PT GP 1          PT G-Codes  Outcome Measure Options: AM-PAC 6 Clicks Daily Activity (OT)  AM-PAC 6 Clicks Score (PT): 14  AM-PAC 6 Clicks Score (OT): 19    Bhargavi Vásquez PT  12/28/2022

## 2022-12-28 NOTE — PLAN OF CARE
Goal Outcome Evaluation:         The patient sat up in chair the beginning of shift. She ambulated in room and to BS. No acute events overnight.The patient is concerned that her Swing Bed days end tomorrow and she doesn't feel quiet ready to return home, but stated she would just have to adjust. She did complain of mild pain and requested PRN tylenol. She is currently sleeping/ resting in bed. Will continue with the plan of care.

## 2022-12-28 NOTE — CASE MANAGEMENT/SOCIAL WORK
Discharge Planning Assessment   [place]     Patient Name: Tre Kinney  MRN: [id number]  Today's Date: 12/28/2022    Admit Date: 10/5/2022    Plan: Pt was admitted to swing bed on 10/5/22. Pt's insurance has been approved  additional swing bed days through 1/4/2023 with clinical updates due 1/3/23. SS to follow.     Discharge Plan     Row Name 12/28/22 1518       Plan    Plan Pt was admitted to swing bed on 10/5/22. Pt's insurance has been approved  additional swing bed days through 1/4/2023 with clinical updates due 1/3/23. SS to follow.                    XANDER Craig

## 2022-12-28 NOTE — PROGRESS NOTES
Patient currently in swing bed for additional therapy in the setting of generalized weakness and debility.  Patient not seen and examined today.  Patient initially admitted to swing bed on 10/5 and approved for additional swing bed days through 12/28 however today approved for further swing bed days through 1/4/2023.  We will continue with PT and OT.  Continues to have no acute issues and progressing well with physical therapy.  Patient to be continued on Eliquis for treatment of DVT.

## 2022-12-28 NOTE — PLAN OF CARE
Goal Outcome Evaluation:  Plan of Care Reviewed With: patient        Progress: no change  Outcome Evaluation: Pt sitting in chair. Pt worked with PT and OT. No acute changes. Will continue plan of care.

## 2022-12-28 NOTE — THERAPY TREATMENT NOTE
Acute Care - Occupational Therapy Treatment Note   [place]     Patient Name: Tre Kinney  : 1932  MRN: [id number]  Today's Date: 2022  Onset of Illness/Injury or Date of Surgery: 10/06/22     Referring Physician: Janneth    Admit Date: 10/5/2022     No diagnosis found.  Patient Active Problem List   Diagnosis   • Acid reflux   • Ankle arthralgia   • Cardiac conduction disorder   • Closed fracture of distal fibula   • Arteriosclerosis of coronary artery   • Fracture of distal end of tibia   • Elevated cholesterol   • BP (high blood pressure)   • Hypertrophic polyarthritis   • OP (osteoporosis)   • Right heart failure (HCC)   • Heart failure, chronic, right-sided (HCC)   • SOB (shortness of breath)   • Palpitations   • CHF (congestive heart failure) (HCC)   • Congestive heart failure (HCC)   • Precordial pain   • Chronic anticoagulation   • Hypokalemia   • Anemia, unspecified type   • Generalized weakness     Past Medical History:   Diagnosis Date   • Anemia    • Arthritis    • CHF (congestive heart failure) ([facility])    • Chronic kidney disease    • Coronary artery disease    • H/O blood clots    • History of transfusion     no reaction    • Hypertension      Past Surgical History:   Procedure Laterality Date   • ABDOMINAL SURGERY     • APPENDECTOMY     • CARDIAC SURGERY     •  SECTION     • COLONOSCOPY N/A 3/7/2018    Procedure: COLONOSCOPY;  Surgeon: Willie Gresham MD;  Location:  [facility];  Service:    • CORONARY ANGIOPLASTY WITH STENT PLACEMENT     • ENDOSCOPY N/A 3/6/2018    Procedure: ESOPHAGOGASTRODUODENOSCOPY;  Surgeon: Willie Gresham MD;  Location:  [facility];  Service:    • PACEMAKER IMPLANTATION      left side    • SKIN BIOPSY     • TUBAL ABDOMINAL LIGATION           OT ASSESSMENT FLOWSHEET (last 12 hours)     OT Evaluation and Treatment     Row Name 22 1505                   OT Time and Intention    Subjective Information no complaints  -KP        Document Type  therapy note (daily note)  -KP        Mode of Treatment individual therapy;occupational therapy  -KP        Total Minutes, Occupational Therapy 31  -KP        Patient Effort good  -KP        Comment Patient seen for OT treatment session. Patient deferred Facetime with family stating she had just performed PT session and Facetimed son during that session. She was seen from 14:05 to 14:36.  -           General Information    Patient Profile Reviewed yes  -KP        Comment, General Information Patient and therapist discussed concerns about possible discharge home. Therapist expressed concern for fall risk with times she may feel weak but feels like she needs to use the restroom emergently. Discussed wearing briefs and placing pads in sitting surfaces to relieve anxiety about having accidents with patient voicing that is a concern. She was able to kathya brief over feet while seated with set-up. Encouraged patient to begin attempting toilet hygiene without assist but with staff present.  -KP           Pain Assessment    Pretreatment Pain Rating 0/10 - no pain  -KP        Posttreatment Pain Rating 0/10 - no pain  -KP           Cognition    Affect/Mental Status (Cognition) WFL  -KP        Orientation Status (Cognition) oriented x 4  -KP        Follows Commands (Cognition) WFL  -KP           Sit-Stand Transfer    Sit-Stand [place] (Transfers) minimum assist (75% patient effort);verbal cues  -        Assistive Device (Sit-Stand Transfers) walker, front-wheeled  -        Comment, (Sit-Stand Transfer) X3 trials (30 seconds, 30 seconds, 1 minute)  -           Motor Skills    Functional Endurance fair plus  -KP           Positioning and Restraints    Pre-Treatment Position sitting in chair/recliner  -KP        Post Treatment Position chair  -KP        In Chair sitting;call light within reach  -KP              User Key  (r) = Recorded By, (t) = Taken By, (c) = Cosigned By    Initials Name Effective Dates    KP  Linn Quevedo OT 06/16/21 -                        OT Recommendation and Plan     Progress Toward Functional Goals (OT): progress toward functional goals is fair  Plan of Care Review  Plan of Care Reviewed With: patient  Progress: improving  Outcome Evaluation: Patient seen for OT treatment session 8:55 to 9:44. Initial session addressed functional transfers/mobility with PT present. Then OT addressed UE strength, endurance, and ROM through seated exercises. She then was seen for ADL retraining as documented for toileting. Continue plan of care.  Plan of Care Reviewed With: patient  Outcome Evaluation: Patient seen for OT treatment session 8:55 to 9:44. Initial session addressed functional transfers/mobility with PT present. Then OT addressed UE strength, endurance, and ROM through seated exercises. She then was seen for ADL retraining as documented for toileting. Continue plan of care.     Outcome Measures     Row Name 12/28/22 1530 12/27/22 1509 12/26/22 1500       How much help from another is currently needed...    Putting on and taking off regular lower body clothing? 3  -KP 3  -KP 2  -LM    Bathing (including washing, rinsing, and drying) 3  -KP 3  -KP 3  -LM    Toileting (which includes using toilet bed pan or urinal) 3  -KP 3  -KP 3  -LM    Putting on and taking off regular upper body clothing 3  -KP 3  -KP 3  -LM    Taking care of personal grooming (such as brushing teeth) 3  -KP 3  -KP 3  -LM    Eating meals 4  -KP 4  -KP 4  -LM    AM-PAC 6 Clicks Score (OT) 19  -KP 19  -KP 18  -LM       Functional Assessment    Outcome Measure Options -- -- AM-PAC 6 Clicks Daily Activity (OT)  -LM          User Key  (r) = Recorded By, (t) = Taken By, (c) = Cosigned By    Initials Name Provider Type    LM Татьяна Bah OT Occupational Therapist    Linn Richardson OT Occupational Therapist                Time Calculation:    Time Calculation- OT     Row Name 12/28/22 1530             Time Calculation- OT    OT Start  Time 1405  -      OT Stop Time 1436  -      OT Time Calculation (min) 31 min  -      OT Received On 12/28/22  -            User Key  (r) = Recorded By, (t) = Taken By, (c) = Cosigned By    Initials Name Provider Type    Linn Richardson OT Occupational Therapist              Therapy Charges for Today     Code Description Service Date Service Provider Modifiers Qty    81896987266 HC OT THER PROC EA 15 MIN 12/27/2022 Linn Quevedo OT GO 1    86593476605 HC OT SELF CARE/MGMT/TRAIN EA 15 MIN 12/28/2022 Linn Quevedo OT GO 1    44424900594 HC OT THERAPEUTIC ACT EA 15 MIN 12/28/2022 Linn Quevedo OT GO 1               Linn Quevedo OT  12/28/2022

## 2022-12-28 NOTE — PROGRESS NOTES
Patient currently in swing bed for additional therapy in the setting of generalized weakness and debility.  Patient not seen and examined today.  Patient initially admitted to swing bed on 10/5 and approved for additional swing bed days through 12/28 with updates to be submitted on that day for requested additional days.  We will continue with PT and OT.  Discussed with patient's daytime nurse head and reports no acute issues and patient participating well with physical therapy.  Patient to be continued on Eliquis for treatment of DVT.

## 2022-12-28 NOTE — CASE MANAGEMENT/SOCIAL WORK
Patient has been approved for additional swing bed days through 1/4/2023 with clinical updates due 1/3/23. Provider and SS notified via secure chat.

## 2022-12-29 PROCEDURE — 99308 SBSQ NF CARE LOW MDM 20: CPT | Performed by: STUDENT IN AN ORGANIZED HEALTH CARE EDUCATION/TRAINING PROGRAM

## 2022-12-29 PROCEDURE — 97116 GAIT TRAINING THERAPY: CPT

## 2022-12-29 PROCEDURE — 97110 THERAPEUTIC EXERCISES: CPT

## 2022-12-29 PROCEDURE — 97530 THERAPEUTIC ACTIVITIES: CPT

## 2022-12-29 RX ADMIN — FUROSEMIDE 40 MG: 40 TABLET ORAL at 08:26

## 2022-12-29 RX ADMIN — HYDRALAZINE HYDROCHLORIDE 5 MG: 25 TABLET, FILM COATED ORAL at 14:58

## 2022-12-29 RX ADMIN — METOPROLOL TARTRATE 25 MG: 25 TABLET, FILM COATED ORAL at 08:25

## 2022-12-29 RX ADMIN — HYDRALAZINE HYDROCHLORIDE 5 MG: 25 TABLET, FILM COATED ORAL at 21:11

## 2022-12-29 RX ADMIN — HYDROCODONE BITARTRATE AND ACETAMINOPHEN 1 TABLET: 7.5; 325 TABLET ORAL at 21:24

## 2022-12-29 RX ADMIN — DOCUSATE SODIUM 100 MG: 100 CAPSULE ORAL at 08:25

## 2022-12-29 RX ADMIN — FUROSEMIDE 40 MG: 40 TABLET ORAL at 17:58

## 2022-12-29 RX ADMIN — APIXABAN 5 MG: 5 TABLET, FILM COATED ORAL at 21:12

## 2022-12-29 RX ADMIN — DOCUSATE SODIUM 100 MG: 100 CAPSULE ORAL at 21:11

## 2022-12-29 RX ADMIN — PANTOPRAZOLE SODIUM 40 MG: 40 TABLET, DELAYED RELEASE ORAL at 05:31

## 2022-12-29 RX ADMIN — HYDRALAZINE HYDROCHLORIDE 5 MG: 25 TABLET, FILM COATED ORAL at 05:31

## 2022-12-29 RX ADMIN — Medication 1 TABLET: at 08:26

## 2022-12-29 RX ADMIN — APIXABAN 5 MG: 5 TABLET, FILM COATED ORAL at 08:26

## 2022-12-29 RX ADMIN — DICLOFENAC 4 G: 10 GEL TOPICAL at 17:58

## 2022-12-29 RX ADMIN — Medication 10 MG: at 21:12

## 2022-12-29 RX ADMIN — DICLOFENAC 4 G: 10 GEL TOPICAL at 08:26

## 2022-12-29 RX ADMIN — TAMSULOSIN HYDROCHLORIDE 0.4 MG: 0.4 CAPSULE ORAL at 08:26

## 2022-12-29 RX ADMIN — DICLOFENAC 4 G: 10 GEL TOPICAL at 21:14

## 2022-12-29 RX ADMIN — GABAPENTIN 300 MG: 300 CAPSULE ORAL at 21:11

## 2022-12-29 RX ADMIN — METOPROLOL TARTRATE 25 MG: 25 TABLET, FILM COATED ORAL at 21:12

## 2022-12-29 RX ADMIN — MAGNESIUM GLUCONATE 500 MG ORAL TABLET 400 MG: 500 TABLET ORAL at 08:25

## 2022-12-29 RX ADMIN — ESCITALOPRAM 10 MG: 10 TABLET, FILM COATED ORAL at 08:26

## 2022-12-29 NOTE — PLAN OF CARE
Goal Outcome Evaluation:           Progress: improving  Outcome Evaluation: Pt. currently resting in bed. Pt. complained of pain in legs and PRN pain medication was given, pt. is resting comfortably at this time. No acute changes at this time. Will continue with plan and care.

## 2022-12-29 NOTE — THERAPY TREATMENT NOTE
Acute Care - Occupational Therapy Treatment Note   [place]     Patient Name: Tre Kinney  : 1932  MRN: [id number]  Today's Date: 2022  Onset of Illness/Injury or Date of Surgery: 10/06/22     Referring Physician: Janneth    Admit Date: 10/5/2022     No diagnosis found.  Patient Active Problem List   Diagnosis   • Acid reflux   • Ankle arthralgia   • Cardiac conduction disorder   • Closed fracture of distal fibula   • Arteriosclerosis of coronary artery   • Fracture of distal end of tibia   • Elevated cholesterol   • BP (high blood pressure)   • Hypertrophic polyarthritis   • OP (osteoporosis)   • Right heart failure (HCC)   • Heart failure, chronic, right-sided (HCC)   • SOB (shortness of breath)   • Palpitations   • CHF (congestive heart failure) (HCC)   • Congestive heart failure (HCC)   • Precordial pain   • Chronic anticoagulation   • Hypokalemia   • Anemia, unspecified type   • Generalized weakness     Past Medical History:   Diagnosis Date   • Anemia    • Arthritis    • CHF (congestive heart failure) ([facility])    • Chronic kidney disease    • Coronary artery disease    • H/O blood clots    • History of transfusion     no reaction    • Hypertension      Past Surgical History:   Procedure Laterality Date   • ABDOMINAL SURGERY     • APPENDECTOMY     • CARDIAC SURGERY     •  SECTION     • COLONOSCOPY N/A 3/7/2018    Procedure: COLONOSCOPY;  Surgeon: Willie Gresham MD;  Location:  [facility];  Service:    • CORONARY ANGIOPLASTY WITH STENT PLACEMENT     • ENDOSCOPY N/A 3/6/2018    Procedure: ESOPHAGOGASTRODUODENOSCOPY;  Surgeon: Willie Gresham MD;  Location:  [facility];  Service:    • PACEMAKER IMPLANTATION      left side    • SKIN BIOPSY     • TUBAL ABDOMINAL LIGATION           OT ASSESSMENT FLOWSHEET (last 12 hours)     OT Evaluation and Treatment     Row Name 22 1519                   OT Time and Intention    Subjective Information no complaints  -LM        Document Type  therapy note (daily note)  -LM        Mode of Treatment occupational therapy  -LM        Patient Effort good  -LM        Comment Patient seen this date for light bue arom/therex at bedside recliner.  Patient performed light tband/yellow strengthening task and bue towel therex with frequent rest breaks.  [id number].  -LM           General Information    Existing Precautions/Restrictions fall  -LM           Cognition    Affect/Mental Status (Cognition) WFL  -LM        Orientation Status (Cognition) oriented x 3  -LM           Positioning and Restraints    Post Treatment Position chair  -LM        In Chair call light within reach;encouraged to call for assist  -LM              User Key  (r) = Recorded By, (t) = Taken By, (c) = Cosigned By    Initials Name Effective Dates    LM Татьяна Bah, OT 06/16/21 -                        OT Recommendation and Plan           Outcome Measures     Row Name 12/29/22 1500 12/28/22 1530 12/27/22 1509       How much help from another is currently needed...    Putting on and taking off regular lower body clothing? 3  -LM 3  -KP 3  -KP    Bathing (including washing, rinsing, and drying) 3  -LM 3  -KP 3  -KP    Toileting (which includes using toilet bed pan or urinal) 3  -LM 3  -KP 3  -KP    Putting on and taking off regular upper body clothing 3  -LM 3  -KP 3  -KP    Taking care of personal grooming (such as brushing teeth) 3  -LM 3  -KP 3  -KP    Eating meals 4  -LM 4  -KP 4  -KP    AM-PAC 6 Clicks Score (OT) 19  -LM 19  -KP 19  -KP       Functional Assessment    Outcome Measure Options AM-PAC 6 Clicks Daily Activity (OT)  -LM -- --          User Key  (r) = Recorded By, (t) = Taken By, (c) = Cosigned By    Initials Name Provider Type    Татьяна Mccabe, OT Occupational Therapist    Linn Richardson OT Occupational Therapist                Time Calculation:    Time Calculation- OT     Row Name 12/29/22 1524             Time Calculation- OT    Total Timed Code Minutes- OT 25 minute(s)   -            User Key  (r) = Recorded By, (t) = Taken By, (c) = Cosigned By    Initials Name Provider Type    LM Татьяна Bah, OT Occupational Therapist              Therapy Charges for Today     Code Description Service Date Service Provider Modifiers Qty    38152723693  OT THER PROC EA 15 MIN 12/29/2022 Татьяна Bah OT GO 2               Татьяна Bah OT  12/29/2022

## 2022-12-29 NOTE — PLAN OF CARE
Goal Outcome Evaluation:  Plan of Care Reviewed With: patient           Outcome Evaluation: Pt sitting up in chair. Pt worked with PT and OT. Pt has no new complaints. Will continue plan of care.

## 2022-12-29 NOTE — PROGRESS NOTES
[facility] HOSPITALIST PROGRESS NOTE     Patient Identification:  Name:  Tre Kinney  Age:  89 y.o.  Sex:  female  :  1932  MRN:  [id number]  Visit Number:  [id number]  ROOM: [address] [place] [address]     Primary Care Provider:  Jessica Pedro APRN    Length of stay in inpatient status:  85    Subjective     Chief Compliant:  No chief complaint on file.      History of Presenting Illness: Patient seen and evaluated in follow-up for acute debility currently admitted to swing bed and receiving ongoing physical therapy.  At time exam patient is pleasant and denies any acute complaints.  She still complains of feeling weak and having periods of feeling like her legs are going to go out from under her times.  But overall she is satisfied with the progress that she is made and remains in fairly good spirits    Objective     Current Hospital Meds:  apixaban, 5 mg, Oral, Q12H  Diclofenac Sodium, 4 g, Topical, 4x Daily  docusate sodium, 100 mg, Oral, BID  escitalopram, 10 mg, Oral, Daily  furosemide, 40 mg, Oral, BID  gabapentin, 300 mg, Oral, Nightly  hydrALAZINE, 5 mg, Oral, Q8H  magnesium oxide, 400 mg, Oral, Daily  melatonin, 10 mg, Oral, Nightly  metoprolol tartrate, 25 mg, Oral, Q12H  multivitamin, 1 tablet, Oral, Daily  pantoprazole, 40 mg, Oral, Q AM  sodium chloride, 10 mL, Intravenous, Q12H  sodium chloride, 10 mL, Intravenous, Q12H  tamsulosin, 0.4 mg, Oral, Daily         ----------------------------------------------------------------------------------------------------------------------  Vital Signs:  Temp:  [97.1 °F (36.2 °C)-97.8 °F (36.6 °C)] 97.8 °F (36.6 °C)  Heart Rate:  [77-81] 77  Resp:  [18] 18  BP: (122-153)/(56-74) 122/56  SpO2:  [95 %-97 %] 97 %  on   ;   Device (Oxygen Therapy): room air  Body mass index is 26.78 kg/m².      Intake/Output Summary (Last 24 hours) at 2022 1721  Last data filed at 2022 1458  Gross per 24 hour   Intake 840 ml   Output --   Net 840 ml       ----------------------------------------------------------------------------------------------------------------------  Physical exam:  Constitutional:  Well-developed and well-nourished.  No acute distress.      HENT:  Head:  Normocephalic and atraumatic.  Mouth:  Moist mucous membranes.    Eyes:  Conjunctivae and EOM are normal. No scleral icterus.    Cardiovascular:  Normal rate, regular rhythm and normal heart sounds with no murmur.  Pulmonary/Chest:  No respiratory distress, no wheezes, no crackles, with normal breath sounds and good air movement.  Abdominal:  Soft.  Bowel sounds are normal.  No distension and no tenderness.   Musculoskeletal:  No tenderness and no deformity.  No red or swollen joints anywhere.  Functional ROM intact.   Neurological:  Alert and oriented to person, place, and time.  No cranial nerve deficit.    Skin:  Skin is warm and dry. No rash or lesion noted. No pallor.   Peripheral vascular:  Pulses in all 4 extremities with no clubbing, no cyanosis, trace edema.  Psychiatric: Appropriate mood and affect, pleasant.   ----------------------------------------------------------------------------------------------------------------------                 No results found for: URINECX  No results found for: BLOODCX    I have personally looked at the labs and they are summarized above.  ----------------------------------------------------------------------------------------------------------------------  Detailed radiology reports for the last 24 hours:  No radiology results for the last day  Assessment & Plan      Generalized weakness and debility  Left lower extremity DVT  Acute kidney injury on CKD stage IV  Acute blood loss anemia  Traumatic right gluteal hematoma  Paroxysmal atrial fibrillation  History of SSS s/p pacemaker  Essential hypertension  History of DVTs  Anxiety and depression  Advanced age    Patient continues in swing bed status and has been approved for further days by her  insurance.  Patient doing well blood pressure remained stable and tolerating Eliquis with no clinical signs or symptoms and no laboratory evidence of any blood loss.  Renal function remained stable.  Continue PT and OT as tolerated.      VTE Prophylaxis:   Mechanical Order History:      Ordered        10/05/22 1252  Place Sequential Compression Device  Once            10/05/22 1252  Maintain Sequential Compression Device  Continuous                    Pharmalogical Order History:      Ordered     Dose Route Frequency Stop    10/27/22 1034  apixaban (ELIQUIS) tablet 5 mg         5 mg PO Every 12 Hours Scheduled --    10/21/22 1301  Enoxaparin Sodium (LOVENOX) syringe 70 mg  Status:  Discontinued         1 mg/kg SC Every 24 Hours 10/27/22 1034    10/17/22 1147  Enoxaparin Sodium (LOVENOX) syringe 80 mg  Status:  Discontinued         1 mg/kg SC Every 24 Hours 10/21/22 1301    10/12/22 1847  Enoxaparin Sodium (LOVENOX) syringe 70 mg  Status:  Discontinued         1 mg/kg SC Every 24 Hours 10/17/22 1147    10/12/22 1831  Pharmacy to Dose enoxaparin (LOVENOX)  Status:  Discontinued         -- XX Continuous PRN 10/13/22 1417    10/05/22 1252  heparin (porcine) 5000 UNIT/ML injection 5,000 Units  Status:  Discontinued         5,000 Units SC Every 12 Hours Scheduled 10/12/22 1846                Disposition Home once number of swing bed approved days are complete.    Benito Gil DO  [facility]  12/29/22  17:21 EST

## 2022-12-29 NOTE — THERAPY TREATMENT NOTE
Acute Care - Physical Therapy Treatment Note   [place]     Patient Name: Tre Kinney  : 1932  MRN: [id number]  Today's Date: 2022   Onset of Illness/Injury or Date of Surgery: 10/06/22  Visit Dx:   No diagnosis found.  Patient Active Problem List   Diagnosis   • Acid reflux   • Ankle arthralgia   • Cardiac conduction disorder   • Closed fracture of distal fibula   • Arteriosclerosis of coronary artery   • Fracture of distal end of tibia   • Elevated cholesterol   • BP (high blood pressure)   • Hypertrophic polyarthritis   • OP (osteoporosis)   • Right heart failure (HCC)   • Heart failure, chronic, right-sided (HCC)   • SOB (shortness of breath)   • Palpitations   • CHF (congestive heart failure) (HCC)   • Congestive heart failure (HCC)   • Precordial pain   • Chronic anticoagulation   • Hypokalemia   • Anemia, unspecified type   • Generalized weakness     Past Medical History:   Diagnosis Date   • Anemia    • Arthritis    • CHF (congestive heart failure) (HCC)    • Chronic kidney disease    • Coronary artery disease    • H/O blood clots    • History of transfusion     no reaction    • Hypertension      Past Surgical History:   Procedure Laterality Date   • ABDOMINAL SURGERY     • APPENDECTOMY     • CARDIAC SURGERY     •  SECTION     • COLONOSCOPY N/A 3/7/2018    Procedure: COLONOSCOPY;  Surgeon: Willie Gresham MD;  Location:  [facility];  Service:    • CORONARY ANGIOPLASTY WITH STENT PLACEMENT     • ENDOSCOPY N/A 3/6/2018    Procedure: ESOPHAGOGASTRODUODENOSCOPY;  Surgeon: Willie Gresham MD;  Location:  [facility];  Service:    • PACEMAKER IMPLANTATION      left side    • SKIN BIOPSY     • TUBAL ABDOMINAL LIGATION       PT Assessment (last 12 hours)     PT Evaluation and Treatment     Row Name 22 1055          Physical Therapy Time and Intention    Subjective Information complains of;fatigue  -CT     Document Type therapy note (daily note)  -CT     Mode of Treatment  physical therapy;individual therapy  -CT     Patient Effort good  -CT     Comment Pt treatment session [id number]. Pt ambulated x 2 and performend seated ther ex.  -CT     Row Name 12/29/22 1055          General Information    Patient Profile Reviewed yes  -CT     Row Name 12/29/22 1055          Cognition    Affect/Mental Status (Cognition) WFL  -CT     Orientation Status (Cognition) oriented x 4  -CT     Follows Commands (Cognition) WFL  -CT     Row Name 12/29/22 1055          Transfers    Transfers sit-stand transfer;stand-sit transfer  -CT     Row Name 12/29/22 1055          Sit-Stand Transfer    Sit-Stand [place] (Transfers) minimum assist (75% patient effort);verbal cues  -CT     Assistive Device (Sit-Stand Transfers) walker, front-wheeled  -CT     Row Name 12/29/22 1055          Stand-Sit Transfer    Stand-Sit [place] (Transfers) minimum assist (75% patient effort)  -CT     Assistive Device (Stand-Sit Transfers) walker, front-wheeled  -CT     Row Name 12/29/22 1055          Gait/Stairs (Locomotion)    [place] Level (Gait) minimum assist (75% patient effort);1 person assist;verbal cues;nonverbal cues (demo/gesture)  -CT     Assistive Device (Gait) walker, front-wheeled  -CT     Distance in Feet (Gait) 11 x 2  -CT     Pattern (Gait) step-to  -CT     Deviations/Abnormal Patterns (Gait) stride length decreased;gait speed decreased;festinating/shuffling;weight shifting decreased  -CT     Bilateral Gait Deviations weight shift ability decreased;forward flexed posture  -CT     Row Name 12/29/22 1055          Motor Skills    Therapeutic Exercise --  BLE seated ther ex: seated marches, LAQ, AP, glut sets, hip abd/add x 25 reps; standing ther ex: standing marches x 10 reps  -CT     Row Name 12/29/22 1055          Coping    Observed Emotional State calm;cooperative;pleasant  -CT     Verbalized Emotional State acceptance  -CT     Row Name 12/29/22 1055          Plan of Care Review    Plan of Care Reviewed  With patient  -CT     Row Name 12/29/22 1055          Positioning and Restraints    Pre-Treatment Position sitting in chair/recliner  -CT     Post Treatment Position chair  -CT     In Chair sitting;call light within reach;encouraged to call for assist  -CT           User Key  (r) = Recorded By, (t) = Taken By, (c) = Cosigned By    Initials Name Provider Type    CT Bhargavi Vásquez PT Physical Therapist                Physical Therapy Education     Title: PT OT SLP Therapies (Done)     Topic: Physical Therapy (Done)     Point: Mobility training (Done)     Learning Progress Summary           Patient Acceptance, E,TB, VU by AC at 12/28/2022 1505    Acceptance, E,TB, VU by AC at 12/27/2022 1513    Acceptance, E, VU by CL at 12/26/2022 1325    Acceptance, E, VU by CL at 12/22/2022 1826    Acceptance, E, VU by CL at 12/21/2022 1303    Acceptance, E, VU by EB at 12/20/2022 1644    Acceptance, E, VU by CL at 12/17/2022 1703    Acceptance, E,D, VU,NR by LL at 12/16/2022 1605    Acceptance, E,TB, VU by AC at 12/16/2022 1523    Acceptance, E,TB, VU by AC at 12/15/2022 1618    Acceptance, E,TB, VU by AC at 12/14/2022 1503    Acceptance, E,TB, VU by DL at 12/13/2022 0240    Acceptance, D,E, VU,NR by LL at 12/12/2022 1455    Acceptance, E,TB, VU by RW at 12/9/2022 1451    Acceptance, E,TB, VU by BC at 11/27/2022 1319    Acceptance, E,TB, VU by RM at 11/20/2022 1449    Acceptance, E,TB, VU by RM at 11/18/2022 1021    Acceptance, E,TB, VU by RH at 11/16/2022 1110    Acceptance, E,TB, VU by KK at 11/14/2022 1638    Acceptance, E,TB, VU by KK at 11/13/2022 1648    Acceptance, E,TB, VU by KG at 11/10/2022 1827    Acceptance, E,TB, VU by DM at 11/9/2022 1638    Acceptance, E,TB, VU by KK at 11/8/2022 1754    Acceptance, E,TB, VU by KK at 11/7/2022 1635    Acceptance, E,D, VU,NR by LL at 11/5/2022 1325    Acceptance, E, VU by EB at 11/4/2022 1448    Acceptance, E,TB, VU by KM at 11/4/2022 1101   Family Acceptance, D,E, VU,NR by LL at  12/12/2022 1455                   Point: Home exercise program (Done)     Learning Progress Summary           Patient Acceptance, E,TB, VU by AC at 12/28/2022 1505    Acceptance, E,TB, VU by AC at 12/27/2022 1513    Acceptance, E, VU by CL at 12/26/2022 1325    Acceptance, E, VU by CL at 12/22/2022 1826    Acceptance, E, VU by CL at 12/21/2022 1303    Acceptance, E, VU by EB at 12/20/2022 1644    Acceptance, E, VU by CL at 12/17/2022 1703    Acceptance, E,D, VU,NR by LL at 12/16/2022 1605    Acceptance, E,TB, VU by AC at 12/16/2022 1523    Acceptance, E,TB, VU by AC at 12/15/2022 1618    Acceptance, E,TB, VU by AC at 12/14/2022 1503    Acceptance, E,TB, VU by DL at 12/13/2022 0240    Acceptance, D,E, VU,NR by LL at 12/12/2022 1455    Acceptance, E,TB, VU by RW at 12/9/2022 1451    Acceptance, E,TB, VU by BC at 11/27/2022 1319    Acceptance, E,TB, VU by RM at 11/20/2022 1449    Acceptance, E,TB, VU by RM at 11/18/2022 1021    Acceptance, E,TB, VU by RH at 11/16/2022 1110    Acceptance, E,TB, VU by KK at 11/14/2022 1638    Acceptance, E,TB, VU by KK at 11/13/2022 1648    Acceptance, E,TB, VU by KG at 11/10/2022 1827    Acceptance, E,TB, VU by DM at 11/9/2022 1638    Acceptance, E,TB, VU by KK at 11/8/2022 1754    Acceptance, E,TB, VU by KK at 11/7/2022 1635    Acceptance, E,D, VU,NR by LL at 11/5/2022 1325    Acceptance, E, VU by EB at 11/4/2022 1448    Acceptance, E,TB, VU by KM at 11/4/2022 1101   Family Acceptance, D,E, VU,NR by LL at 12/12/2022 1455                   Point: Body mechanics (Done)     Learning Progress Summary           Patient Acceptance, E,TB, VU by AC at 12/28/2022 1505    Acceptance, E,TB, VU by AC at 12/27/2022 1513    Acceptance, E, VU by CL at 12/26/2022 1325    Acceptance, E, VU by CL at 12/22/2022 1826    Acceptance, E, VU by CL at 12/21/2022 1303    Acceptance, E, VU by EB at 12/20/2022 1644    Acceptance, E, VU by CL at 12/17/2022 1703    Acceptance, E,D, VU,NR by LL at 12/16/2022 1605     Acceptance, E,TB, VU by AC at 12/16/2022 1523    Acceptance, E,TB, VU by AC at 12/15/2022 1618    Acceptance, E,TB, VU by AC at 12/14/2022 1503    Acceptance, E,TB, VU by DL at 12/13/2022 0240    Acceptance, D,E, VU,NR by LL at 12/12/2022 1455    Acceptance, E,TB, VU by RW at 12/9/2022 1451    Acceptance, E,TB, VU by BC at 11/27/2022 1319    Acceptance, E,TB, VU by RM at 11/20/2022 1449    Acceptance, E,TB, VU by RM at 11/18/2022 1021    Acceptance, E,TB, VU by RH at 11/16/2022 1110    Acceptance, E,TB, VU by KK at 11/14/2022 1638    Acceptance, E,TB, VU by KK at 11/13/2022 1648    Acceptance, E,TB, VU by KG at 11/10/2022 1827    Acceptance, E,TB, VU by DM at 11/9/2022 1638    Acceptance, E,TB, VU by KK at 11/8/2022 1754    Acceptance, E,TB, VU by KK at 11/7/2022 1635    Acceptance, E,D, VU,NR by LL at 11/5/2022 1325    Acceptance, E, VU by EB at 11/4/2022 1448    Acceptance, E,TB, VU by KM at 11/4/2022 1101   Family Acceptance, D,E, VU,NR by LL at 12/12/2022 1455                   Point: Precautions (Done)     Learning Progress Summary           Patient Acceptance, E,TB, VU by AC at 12/28/2022 1505    Acceptance, E,TB, VU by AC at 12/27/2022 1513    Acceptance, E, VU by CL at 12/26/2022 1325    Acceptance, E, VU by CL at 12/22/2022 1826    Acceptance, E, VU by CL at 12/21/2022 1303    Acceptance, E, VU by EB at 12/20/2022 1644    Acceptance, E, VU by CL at 12/17/2022 1703    Acceptance, E,D, VU,NR by LL at 12/16/2022 1605    Acceptance, E,TB, VU by AC at 12/16/2022 1523    Acceptance, E,TB, VU by AC at 12/15/2022 1618    Acceptance, E,TB, VU by AC at 12/14/2022 1503    Acceptance, E,TB, VU by DL at 12/13/2022 0240    Acceptance, D,E, VU,NR by LL at 12/12/2022 1455    Acceptance, E,TB, VU by RW at 12/9/2022 1451    Acceptance, E,TB, VU by BC at 11/27/2022 1319    Acceptance, E,TB, VU by RM at 11/20/2022 1449    Acceptance, E,TB, VU by RM at 11/18/2022 1021    Acceptance, E,TB, VU by RH at 11/16/2022 1110     Acceptance, E,TB, VU by KK at 11/14/2022 1638    Acceptance, E,TB, VU by KK at 11/13/2022 1648    Acceptance, E,TB, VU by KG at 11/10/2022 1827    Acceptance, E,TB, VU by DM at 11/9/2022 1638    Acceptance, E,TB, VU by KK at 11/8/2022 1754    Acceptance, E,TB, VU by KK at 11/7/2022 1635    Acceptance, E,D, VU,NR by LL at 11/5/2022 1325    Acceptance, E, VU by EB at 11/4/2022 1448    Acceptance, E,TB, VU by KM at 11/4/2022 1101   Family Acceptance, D,E, VU,NR by LL at 12/12/2022 1455                               User Key     Initials Effective Dates Name Provider Type Discipline    CL 06/16/21 -  Mitzy Mustafa, RN Registered Nurse Nurse    LL 05/02/16 -  Neda Garcia, PTA Physical Therapist Assistant PT    RM 06/14/22 -  Bethany Krause, RN Registered Nurse Nurse    DM 06/16/21 -  Moniz, Deborah, RN Registered Nurse Nurse    BC 08/02/22 -  Collett, Brittany, RN Registered Nurse Nurse    AC 10/13/22 -  Jaya Castillo, SONAM Registered Nurse Nurse    KM 05/24/22 -  Mao Vázquez, PT Physical Therapist PT    EB 09/22/22 -  Cedric Oliver, RN Registered Nurse Nurse    KK 06/27/22 -  Chelsi Bolivar, RN Registered Nurse Nurse    DL 03/16/22 -  Francy Garcia, RN Registered Nurse Nurse    KG 09/22/22 -  Verónica Rodriguez, RN Registered Nurse Nurse    RW 09/22/22 -  Bethany Schofield, RN Registered Nurse Nurse    RH 08/03/22 - 12/21/22 Jung Russell, SONAM Extern Registered Nurse Nurse              PT Recommendation and Plan  Anticipated Discharge Disposition (PT): home with 24/7 care, skilled nursing facility  Planned Therapy Interventions (PT): balance training, bed mobility training, gait training, home exercise program, manual therapy techniques, motor coordination training, neuromuscular re-education, patient/family education, postural re-education, strengthening, transfer training  Therapy Frequency (PT): 5 times/wk (5-6 times/wk)  Progress Summary (PT)  Progress Toward Functional Goals (PT): progress toward  functional goals is gradual  Plan of Care Reviewed With: patient  Progress: improving   Outcome Measures     Row Name 12/28/22 1530 12/27/22 1509 12/26/22 1500       How much help from another is currently needed...    Putting on and taking off regular lower body clothing? 3  -KP 3  -KP 2  -LM    Bathing (including washing, rinsing, and drying) 3  -KP 3  -KP 3  -LM    Toileting (which includes using toilet bed pan or urinal) 3  -KP 3  -KP 3  -LM    Putting on and taking off regular upper body clothing 3  -KP 3  -KP 3  -LM    Taking care of personal grooming (such as brushing teeth) 3  -KP 3  -KP 3  -LM    Eating meals 4  -KP 4  -KP 4  -LM    AM-PAC 6 Clicks Score (OT) 19  -KP 19  -KP 18  -LM       Functional Assessment    Outcome Measure Options -- -- AM-PAC 6 Clicks Daily Activity (OT)  -LM          User Key  (r) = Recorded By, (t) = Taken By, (c) = Cosigned By    Initials Name Provider Type    LM Татьяна Bah, OT Occupational Therapist     Linn Quevedo, VELASQUEZ Occupational Therapist                 Time Calculation:    PT Charges     Row Name 12/29/22 1310             Time Calculation    Start Time 1055  -CT      Stop Time 1133  -CT      Time Calculation (min) 38 min  -CT      PT Received On 12/29/22  -CT            User Key  (r) = Recorded By, (t) = Taken By, (c) = Cosigned By    Initials Name Provider Type    CT Bhargavi Vásquez, PT Physical Therapist              Therapy Charges for Today     Code Description Service Date Service Provider Modifiers Qty    17450194443 HC GAIT TRAINING EA 15 MIN 12/28/2022 Bhargavi Vásquez, PT GP 1    14143529263 HC PT THER PROC EA 15 MIN 12/28/2022 Bhargavi Vásquez, PT GP 1    76887812853 HC PT THER PROC EA 15 MIN 12/29/2022 Bhargavi Vásquez, PT GP 1    25470736569 HC GAIT TRAINING EA 15 MIN 12/29/2022 Bhargavi Vásquez, PT GP 1    [id number]  PT THERAPEUTIC ACT EA 15 MIN 12/29/2022 Bhargavi Vásquez, PT GP 1          PT G-Codes  Outcome Measure Options: AM-PAC 6 Clicks Daily  Activity (OT)  AM-PAC 6 Clicks Score (PT): 14  AM-PAC 6 Clicks Score (OT): 19    Bhargavi Vásquez, PT  12/29/2022

## 2022-12-30 PROCEDURE — 97535 SELF CARE MNGMENT TRAINING: CPT

## 2022-12-30 PROCEDURE — 97116 GAIT TRAINING THERAPY: CPT

## 2022-12-30 PROCEDURE — 97530 THERAPEUTIC ACTIVITIES: CPT

## 2022-12-30 RX ADMIN — GABAPENTIN 300 MG: 300 CAPSULE ORAL at 20:21

## 2022-12-30 RX ADMIN — PANTOPRAZOLE SODIUM 40 MG: 40 TABLET, DELAYED RELEASE ORAL at 06:46

## 2022-12-30 RX ADMIN — DOCUSATE SODIUM 100 MG: 100 CAPSULE ORAL at 09:06

## 2022-12-30 RX ADMIN — HYDRALAZINE HYDROCHLORIDE 5 MG: 25 TABLET, FILM COATED ORAL at 22:10

## 2022-12-30 RX ADMIN — HYDROCODONE BITARTRATE AND ACETAMINOPHEN 1 TABLET: 7.5; 325 TABLET ORAL at 06:51

## 2022-12-30 RX ADMIN — Medication 10 MG: at 20:21

## 2022-12-30 RX ADMIN — DOCUSATE SODIUM 100 MG: 100 CAPSULE ORAL at 20:21

## 2022-12-30 RX ADMIN — APIXABAN 5 MG: 5 TABLET, FILM COATED ORAL at 09:06

## 2022-12-30 RX ADMIN — METOPROLOL TARTRATE 25 MG: 25 TABLET, FILM COATED ORAL at 09:06

## 2022-12-30 RX ADMIN — DICLOFENAC 4 G: 10 GEL TOPICAL at 18:32

## 2022-12-30 RX ADMIN — METOPROLOL TARTRATE 25 MG: 25 TABLET, FILM COATED ORAL at 20:20

## 2022-12-30 RX ADMIN — MAGNESIUM GLUCONATE 500 MG ORAL TABLET 400 MG: 500 TABLET ORAL at 09:07

## 2022-12-30 RX ADMIN — FUROSEMIDE 40 MG: 40 TABLET ORAL at 18:38

## 2022-12-30 RX ADMIN — ESCITALOPRAM 10 MG: 10 TABLET, FILM COATED ORAL at 09:07

## 2022-12-30 RX ADMIN — TAMSULOSIN HYDROCHLORIDE 0.4 MG: 0.4 CAPSULE ORAL at 09:06

## 2022-12-30 RX ADMIN — DICLOFENAC 4 G: 10 GEL TOPICAL at 13:29

## 2022-12-30 RX ADMIN — Medication 1 TABLET: at 09:06

## 2022-12-30 RX ADMIN — HYDRALAZINE HYDROCHLORIDE 5 MG: 25 TABLET, FILM COATED ORAL at 06:46

## 2022-12-30 RX ADMIN — DICLOFENAC 4 G: 10 GEL TOPICAL at 09:07

## 2022-12-30 RX ADMIN — APIXABAN 5 MG: 5 TABLET, FILM COATED ORAL at 20:20

## 2022-12-30 RX ADMIN — FUROSEMIDE 40 MG: 40 TABLET ORAL at 09:06

## 2022-12-30 NOTE — THERAPY TREATMENT NOTE
Acute Care - Physical Therapy Treatment Note   [place]     Patient Name: Tre Kinney  : 1932  MRN: [id number]  Today's Date: 2022   Onset of Illness/Injury or Date of Surgery: 10/06/22  Visit Dx:   No diagnosis found.  Patient Active Problem List   Diagnosis   • Acid reflux   • Ankle arthralgia   • Cardiac conduction disorder   • Closed fracture of distal fibula   • Arteriosclerosis of coronary artery   • Fracture of distal end of tibia   • Elevated cholesterol   • BP (high blood pressure)   • Hypertrophic polyarthritis   • OP (osteoporosis)   • Right heart failure (HCC)   • Heart failure, chronic, right-sided (HCC)   • SOB (shortness of breath)   • Palpitations   • CHF (congestive heart failure) (HCC)   • Congestive heart failure (HCC)   • Precordial pain   • Chronic anticoagulation   • Hypokalemia   • Anemia, unspecified type   • Generalized weakness     Past Medical History:   Diagnosis Date   • Anemia    • Arthritis    • CHF (congestive heart failure) (HCC)    • Chronic kidney disease    • Coronary artery disease    • H/O blood clots    • History of transfusion     no reaction    • Hypertension      Past Surgical History:   Procedure Laterality Date   • ABDOMINAL SURGERY     • APPENDECTOMY     • CARDIAC SURGERY     •  SECTION     • COLONOSCOPY N/A 3/7/2018    Procedure: COLONOSCOPY;  Surgeon: Willie Gresham MD;  Location:  [facility];  Service:    • CORONARY ANGIOPLASTY WITH STENT PLACEMENT     • ENDOSCOPY N/A 3/6/2018    Procedure: ESOPHAGOGASTRODUODENOSCOPY;  Surgeon: Willie Gresham MD;  Location:  [facility];  Service:    • PACEMAKER IMPLANTATION      left side    • SKIN BIOPSY     • TUBAL ABDOMINAL LIGATION       PT Assessment (last 12 hours)     PT Evaluation and Treatment     Row Name 22 1425          Physical Therapy Time and Intention    Subjective Information no complaints  -HR     Document Type therapy note (daily note)  -HR     Mode of Treatment physical  therapy;individual therapy  -HR     Patient Effort good  -HR     Comment Pt seen from [id number] for PT. Pt walked 12' x 2 with RW min A, required cueing to turn her backside all the way aroud before sitting. Transfer from chair  to BSC with RW min A, Pt did not make it and urinated in floor. Hygiene completed with Pt on BSC and floor wiped dry. Sitting exercises completed in between walks until fatigue. Pts son on facetime throughout session.  -HR     Row Name 12/30/22 1425          General Information    Patient Profile Reviewed yes  -HR     Row Name 12/30/22 1425          Cognition    Affect/Mental Status (Cognition) WFL  -HR     Orientation Status (Cognition) oriented x 4  -HR     Follows Commands (Cognition) WFL  -HR     Personal Safety Interventions fall prevention program maintained;gait belt;nonskid shoes/slippers when out of bed;supervised activity  -HR     Row Name 12/30/22 1425          Bed Mobility    Comment, (Bed Mobility) Pt up in chair  -HR     Row Name 12/30/22 1423          Transfers    Transfers sit-stand transfer;stand-sit transfer  -HR     Row Name 12/30/22 1425          Sit-Stand Transfer    Sit-Stand [place] (Transfers) minimum assist (75% patient effort);verbal cues  -HR     Assistive Device (Sit-Stand Transfers) walker, front-wheeled  -HR     Row Name 12/30/22 1425          Stand-Sit Transfer    Stand-Sit [place] (Transfers) minimum assist (75% patient effort)  -HR     Assistive Device (Stand-Sit Transfers) walker, front-wheeled  -HR     Row Name 12/30/22 1425          Toilet Transfer    Type (Toilet Transfer) sit-stand;stand-sit;stand pivot/stand step  -HR     [place] Level (Toilet Transfer) minimum assist (75% patient effort)  -HR     Row Name 12/30/22 1421          Gait/Stairs (Locomotion)    [place] Level (Gait) minimum assist (75% patient effort);1 person assist;verbal cues;nonverbal cues (demo/gesture)  -HR     Assistive Device (Gait) walker, front-wheeled  -HR      Distance in Feet (Gait) 12' x 2  -HR     Pattern (Gait) step-to  -HR     Deviations/Abnormal Patterns (Gait) stride length decreased;gait speed decreased;festinating/shuffling;weight shifting decreased  -HR     Bilateral Gait Deviations weight shift ability decreased;forward flexed posture  -HR     Row Name 12/30/22 1425          Motor Skills    Therapeutic Exercise other (see comments)  Sitting: AP, LAQ, March, Knees in/out  -HR     Row Name 12/30/22 1425          Coping    Observed Emotional State calm;cooperative;pleasant  -HR     Verbalized Emotional State acceptance  -HR     Row Name 12/30/22 1425          Positioning and Restraints    Pre-Treatment Position sitting in chair/recliner  -HR     Post Treatment Position chair  -HR     In Chair sitting;call light within reach;encouraged to call for assist  -HR           User Key  (r) = Recorded By, (t) = Taken By, (c) = Cosigned By    Initials Name Provider Type    HR Lindsay Rockwell PTA Physical Therapist Assistant                Physical Therapy Education     Title: PT OT SLP Therapies (Done)     Topic: Physical Therapy (Done)     Point: Mobility training (Done)     Learning Progress Summary           Patient Acceptance, E,TB, VU by RM at 12/30/2022 1035    Acceptance, E,TB, VU by AC at 12/28/2022 1505    Acceptance, E,TB, VU by AC at 12/27/2022 1513    Acceptance, E, VU by CL at 12/26/2022 1325    Acceptance, E, VU by CL at 12/22/2022 1826    Acceptance, E, VU by CL at 12/21/2022 1303    Acceptance, E, VU by EB at 12/20/2022 1644    Acceptance, E, VU by CL at 12/17/2022 1703    Acceptance, E,D, VU,NR by LL at 12/16/2022 1605    Acceptance, E,TB, VU by AC at 12/16/2022 1523    Acceptance, E,TB, VU by AC at 12/15/2022 1618    Acceptance, E,TB, VU by AC at 12/14/2022 1503    Acceptance, E,TB, VU by DL at 12/13/2022 0240    Acceptance, D,E, VU,NR by LL at 12/12/2022 1455    Acceptance, E,TB, VU by RW at 12/9/2022 1451    Acceptance, E,TB, VU by BC at 11/27/2022 6132     Acceptance, E,TB, VU by RM at 11/20/2022 1449    Acceptance, E,TB, VU by RM at 11/18/2022 1021    Acceptance, E,TB, VU by RH at 11/16/2022 1110    Acceptance, E,TB, VU by KK at 11/14/2022 1638    Acceptance, E,TB, VU by KK at 11/13/2022 1648    Acceptance, E,TB, VU by KG at 11/10/2022 1827    Acceptance, E,TB, VU by DM at 11/9/2022 1638    Acceptance, E,TB, VU by KK at 11/8/2022 1754    Acceptance, E,TB, VU by KK at 11/7/2022 1635    Acceptance, E,D, VU,NR by LL at 11/5/2022 1325    Acceptance, E, VU by EB at 11/4/2022 1448    Acceptance, E,TB, VU by KM at 11/4/2022 1101   Family Acceptance, D,E, VU,NR by LL at 12/12/2022 1455                   Point: Home exercise program (Done)     Learning Progress Summary           Patient Acceptance, E,TB, VU by RM at 12/30/2022 1035    Acceptance, E,TB, VU by AC at 12/28/2022 1505    Acceptance, E,TB, VU by AC at 12/27/2022 1513    Acceptance, E, VU by CL at 12/26/2022 1325    Acceptance, E, VU by CL at 12/22/2022 1826    Acceptance, E, VU by CL at 12/21/2022 1303    Acceptance, E, VU by EB at 12/20/2022 1644    Acceptance, E, VU by CL at 12/17/2022 1703    Acceptance, E,D, VU,NR by LL at 12/16/2022 1605    Acceptance, E,TB, VU by AC at 12/16/2022 1523    Acceptance, E,TB, VU by AC at 12/15/2022 1618    Acceptance, E,TB, VU by AC at 12/14/2022 1503    Acceptance, E,TB, VU by DL at 12/13/2022 0240    Acceptance, D,E, VU,NR by LL at 12/12/2022 1455    Acceptance, E,TB, VU by RW at 12/9/2022 1451    Acceptance, E,TB, VU by BC at 11/27/2022 1319    Acceptance, E,TB, VU by RM at 11/20/2022 1449    Acceptance, E,TB, VU by RM at 11/18/2022 1021    Acceptance, E,TB, VU by RH at 11/16/2022 1110    Acceptance, E,TB, VU by KK at 11/14/2022 1638    Acceptance, E,TB, VU by KK at 11/13/2022 1648    Acceptance, E,TB, VU by KG at 11/10/2022 1827    Acceptance, E,TB, VU by DM at 11/9/2022 1638    Acceptance, E,TB, VU by KK at 11/8/2022 1754    Acceptance, E,TB, VU by KK at 11/7/2022 1635     Acceptance, E,D, VU,NR by LL at 11/5/2022 1325    Acceptance, E, VU by EB at 11/4/2022 1448    Acceptance, E,TB, VU by KM at 11/4/2022 1101   Family Acceptance, D,E, VU,NR by LL at 12/12/2022 1455                   Point: Body mechanics (Done)     Learning Progress Summary           Patient Acceptance, E,TB, VU by RM at 12/30/2022 1035    Acceptance, E,TB, VU by AC at 12/28/2022 1505    Acceptance, E,TB, VU by AC at 12/27/2022 1513    Acceptance, E, VU by CL at 12/26/2022 1325    Acceptance, E, VU by CL at 12/22/2022 1826    Acceptance, E, VU by CL at 12/21/2022 1303    Acceptance, E, VU by EB at 12/20/2022 1644    Acceptance, E, VU by CL at 12/17/2022 1703    Acceptance, E,D, VU,NR by LL at 12/16/2022 1605    Acceptance, E,TB, VU by AC at 12/16/2022 1523    Acceptance, E,TB, VU by AC at 12/15/2022 1618    Acceptance, E,TB, VU by AC at 12/14/2022 1503    Acceptance, E,TB, VU by DL at 12/13/2022 0240    Acceptance, D,E, VU,NR by LL at 12/12/2022 1455    Acceptance, E,TB, VU by RW at 12/9/2022 1451    Acceptance, E,TB, VU by BC at 11/27/2022 1319    Acceptance, E,TB, VU by RM at 11/20/2022 1449    Acceptance, E,TB, VU by RM at 11/18/2022 1021    Acceptance, E,TB, VU by RH at 11/16/2022 1110    Acceptance, E,TB, VU by KK at 11/14/2022 1638    Acceptance, E,TB, VU by KK at 11/13/2022 1648    Acceptance, E,TB, VU by KG at 11/10/2022 1827    Acceptance, E,TB, VU by DM at 11/9/2022 1638    Acceptance, E,TB, VU by KK at 11/8/2022 1754    Acceptance, E,TB, VU by KK at 11/7/2022 1635    Acceptance, E,D, VU,NR by LL at 11/5/2022 1325    Acceptance, E, VU by EB at 11/4/2022 1448    Acceptance, E,TB, VU by KM at 11/4/2022 1101   Family Acceptance, D,E, VU,NR by LL at 12/12/2022 1455                   Point: Precautions (Done)     Learning Progress Summary           Patient Acceptance, E,TB, VU by RM at 12/30/2022 1035    Acceptance, E,TB, VU by AC at 12/28/2022 1505    Acceptance, E,TB, VU by AC at 12/27/2022 1513    Acceptance,  E, VU by CL at 12/26/2022 1325    Acceptance, E, VU by CL at 12/22/2022 1826    Acceptance, E, VU by CL at 12/21/2022 1303    Acceptance, E, VU by EB at 12/20/2022 1644    Acceptance, E, VU by CL at 12/17/2022 1703    Acceptance, E,D, VU,NR by LL at 12/16/2022 1605    Acceptance, E,TB, VU by AC at 12/16/2022 1523    Acceptance, E,TB, VU by AC at 12/15/2022 1618    Acceptance, E,TB, VU by AC at 12/14/2022 1503    Acceptance, E,TB, VU by DL at 12/13/2022 0240    Acceptance, D,E, VU,NR by LL at 12/12/2022 1455    Acceptance, E,TB, VU by RW at 12/9/2022 1451    Acceptance, E,TB, VU by BC at 11/27/2022 1319    Acceptance, E,TB, VU by RM at 11/20/2022 1449    Acceptance, E,TB, VU by RM at 11/18/2022 1021    Acceptance, E,TB, VU by RH at 11/16/2022 1110    Acceptance, E,TB, VU by KK at 11/14/2022 1638    Acceptance, E,TB, VU by KK at 11/13/2022 1648    Acceptance, E,TB, VU by KG at 11/10/2022 1827    Acceptance, E,TB, VU by DM at 11/9/2022 1638    Acceptance, E,TB, VU by KK at 11/8/2022 1754    Acceptance, E,TB, VU by KK at 11/7/2022 1635    Acceptance, E,D, VU,NR by LL at 11/5/2022 1325    Acceptance, E, VU by EB at 11/4/2022 1448    Acceptance, E,TB, VU by KM at 11/4/2022 1101   Family Acceptance, D,E, VU,NR by LL at 12/12/2022 1455                               User Key     Initials Effective Dates Name Provider Type Discipline    CL 06/16/21 -  Mitzy Mustafa, RN Registered Nurse Nurse    LL 05/02/16 -  Garcia, Neda, PTA Physical Therapist Assistant PT    RM 06/14/22 -  Bethany Krause, RN Registered Nurse Nurse    DM 06/16/21 -  Moniz, Deborah, RN Registered Nurse Nurse    BC 08/02/22 -  Collett, Brittany, RN Registered Nurse Nurse    AC 10/13/22 -  Jaya Castillo, RN Registered Nurse Nurse    KM 05/24/22 -  Mao Vázquez, PT Physical Therapist PT    EB 09/22/22 -  Cedric Oliver, RN Registered Nurse Nurse    KK 06/27/22 -  Chelsi Bolivar, RN Registered Nurse Nurse    DL 03/16/22 -  Fracny Garcia, RN Registered  Nurse Nurse    KG 09/22/22 -  Verónica Rodriguez, RN Registered Nurse Nurse    RW 09/22/22 -  Bethany Schofield, RN Registered Nurse Nurse    RH 08/03/22 - 12/21/22 Jung Russell, SONAM Extern Registered Nurse Nurse              PT Recommendation and Plan         Outcome Measures     Row Name 12/29/22 1500 12/28/22 1530 12/27/22 1509       How much help from another is currently needed...    Putting on and taking off regular lower body clothing? 3  -LM 3  -KP 3  -KP    Bathing (including washing, rinsing, and drying) 3  -LM 3  -KP 3  -KP    Toileting (which includes using toilet bed pan or urinal) 3  -LM 3  -KP 3  -KP    Putting on and taking off regular upper body clothing 3  -LM 3  -KP 3  -KP    Taking care of personal grooming (such as brushing teeth) 3  -LM 3  -KP 3  -KP    Eating meals 4  -LM 4  -KP 4  -KP    AM-PAC 6 Clicks Score (OT) 19  -LM 19  -KP 19  -KP       Functional Assessment    Outcome Measure Options AM-PAC 6 Clicks Daily Activity (OT)  -LM -- --          User Key  (r) = Recorded By, (t) = Taken By, (c) = Cosigned By    Initials Name Provider Type    LM Татьяна Bah OT Occupational Therapist    Linn Richardson OT Occupational Therapist                 Time Calculation:    PT Charges     Row Name 12/30/22 1435             Time Calculation    Start Time 1250  -HR      Stop Time 1316  -HR      Time Calculation (min) 26 min  -HR      PT Received On 12/30/22  -HR         Time Calculation- PT    Total Timed Code Minutes- PT 26 minute(s)  -HR            User Key  (r) = Recorded By, (t) = Taken By, (c) = Cosigned By    Initials Name Provider Type    HR Lindsay Rockwell PTA Physical Therapist Assistant              Therapy Charges for Today     Code Description Service Date Service Provider Modifiers Qty    51916462318 HC GAIT TRAINING EA 15 MIN 12/30/2022 Lindsay Rockwell PTA GP, CQ 1    86168285850 HC PT THERAPEUTIC ACT EA 15 MIN 12/30/2022 Lindsay Rockwell PTA GP, CQ 1          PT G-Codes  Outcome Measure  Options: AM-PAC 6 Clicks Daily Activity (OT)  AM-PAC 6 Clicks Score (PT): 14  AM-PAC 6 Clicks Score (OT): 19    Lindsay Rockwell, NACHO  12/30/2022

## 2022-12-30 NOTE — NURSING NOTE
Patient with pale purple bruising to left lower back.  Does not appear to be pressure related.  Recommend leaving open to air.

## 2022-12-30 NOTE — PLAN OF CARE
Goal Outcome Evaluation:         Patient resting quietly in bed; no acute changes this shift. Will continue to monitor.

## 2022-12-30 NOTE — PROGRESS NOTES
Patient currently in swing bed for additional therapy in the setting of generalized weakness and debility.  Patient not seen and examined today.  Patient initially admitted to swing bed on 10/5 and approved for additional swing bed days through 1/4/2023.  We will continue with PT and OT.  Continues to have no acute issues and progressing well with physical therapy.  Patient to be continued on Eliquis for treatment of DVT.

## 2022-12-30 NOTE — PLAN OF CARE
Goal Outcome Evaluation:  Plan of Care Reviewed With: patient        Progress: improving  Outcome Evaluation: Patient's VSS. Pt ambulated to the chair & sat in the chair throughout shift. Pt worked with PT. Pt voiced no further concerns at this time.

## 2022-12-30 NOTE — THERAPY TREATMENT NOTE
Acute Care - Occupational Therapy Treatment Note   [place]     Patient Name: Tre Kinney  : 1932  MRN: [id number]  Today's Date: 2022  Onset of Illness/Injury or Date of Surgery: 10/06/22     Referring Physician: Janneth    Admit Date: 10/5/2022     No diagnosis found.  Patient Active Problem List   Diagnosis   • Acid reflux   • Ankle arthralgia   • Cardiac conduction disorder   • Closed fracture of distal fibula   • Arteriosclerosis of coronary artery   • Fracture of distal end of tibia   • Elevated cholesterol   • BP (high blood pressure)   • Hypertrophic polyarthritis   • OP (osteoporosis)   • Right heart failure (HCC)   • Heart failure, chronic, right-sided (HCC)   • SOB (shortness of breath)   • Palpitations   • CHF (congestive heart failure) (HCC)   • Congestive heart failure (HCC)   • Precordial pain   • Chronic anticoagulation   • Hypokalemia   • Anemia, unspecified type   • Generalized weakness     Past Medical History:   Diagnosis Date   • Anemia    • Arthritis    • CHF (congestive heart failure) ([facility])    • Chronic kidney disease    • Coronary artery disease    • H/O blood clots    • History of transfusion     no reaction    • Hypertension      Past Surgical History:   Procedure Laterality Date   • ABDOMINAL SURGERY     • APPENDECTOMY     • CARDIAC SURGERY     •  SECTION     • COLONOSCOPY N/A 3/7/2018    Procedure: COLONOSCOPY;  Surgeon: Willie Gresham MD;  Location:  [facility];  Service:    • CORONARY ANGIOPLASTY WITH STENT PLACEMENT     • ENDOSCOPY N/A 3/6/2018    Procedure: ESOPHAGOGASTRODUODENOSCOPY;  Surgeon: Willie Gresham MD;  Location:  [facility];  Service:    • PACEMAKER IMPLANTATION      left side    • SKIN BIOPSY     • TUBAL ABDOMINAL LIGATION           OT ASSESSMENT FLOWSHEET (last 12 hours)     OT Evaluation and Treatment     Row Name 22 1715                   OT Time and Intention    Document Type therapy note (daily note)  -KR        Mode of  Treatment occupational therapy  -KR        Patient Effort good  -KR        Comment Pt presents seated in chair at bedside during meal. BU utensils in use at time of session and pt reported utensils have been beneficial in assist with occasional tremors during meals. Weighted utensils/BU utensils options presented and OT will continue to provide as determined most effective in achieving independence/successful completion.  -KR           Plan of Care Review    Plan of Care Reviewed With patient  -KR        Progress improving  -KR           Therapy Assessment/Plan (OT)    Comment, Therapy Assessment/Plan (OT) Consistent improvement noted in all areas of function, consistently. OT to continue as tolerated.  -KR              User Key  (r) = Recorded By, (t) = Taken By, (c) = Cosigned By    Initials Name Effective Dates    Andres Ayala, OT 06/16/21 -                        OT Recommendation and Plan     Progress Toward Functional Goals (OT): progress toward functional goals is gradual  Plan of Care Review  Plan of Care Reviewed With: patient  Progress: improving  Plan of Care Reviewed With: patient     Outcome Measures     Row Name 12/30/22 1819 12/29/22 1500 12/28/22 1530       How much help from another is currently needed...    Putting on and taking off regular lower body clothing? 3  -KR 3  -LM 3  -KP    Bathing (including washing, rinsing, and drying) 3  -KR 3  -LM 3  -KP    Toileting (which includes using toilet bed pan or urinal) 3  -KR 3  -LM 3  -KP    Putting on and taking off regular upper body clothing 3  -KR 3  -LM 3  -KP    Taking care of personal grooming (such as brushing teeth) 3  -KR 3  -LM 3  -KP    Eating meals 4  -KR 4  -LM 4  -KP    AM-PAC 6 Clicks Score (OT) 19  -KR 19  -LM 19  -KP       Functional Assessment    Outcome Measure Options -- AM-PAC 6 Clicks Daily Activity (OT)  -LM --          User Key  (r) = Recorded By, (t) = Taken By, (c) = Cosigned By    Initials Name Provider Type    KARLO Uriarte  Andres GORDON, OT Occupational Therapist    LM Татьяна Bah, OT Occupational Therapist    Linn Richardson, OT Occupational Therapist                Time Calculation:    Time Calculation- OT     Row Name 12/30/22 1820             Time Calculation- OT    Total Timed Code Minutes- OT 15 minute(s)  -KR            User Key  (r) = Recorded By, (t) = Taken By, (c) = Cosigned By    Initials Name Provider Type    KR Andres Uriarte, OT Occupational Therapist              Therapy Charges for Today     Code Description Service Date Service Provider Modifiers Qty    31465798769 HC OT SELF CARE/MGMT/TRAIN EA 15 MIN 12/30/2022 Andres Uriarte, OT GO 1               Andres Uriarte OT  12/30/2022

## 2022-12-30 NOTE — CASE MANAGEMENT/SOCIAL WORK
Discharge Planning Assessment   [place]     Patient Name: Tre Kinney  MRN: [id number]  Today's Date: 12/30/2022    Admit Date: 10/5/2022    Plan: Pt was admitted to swing bed on 10/5/22. Pt's insurance has been approved on 12/28/22 additional swing bed days through 1/4/2023 with clinical updates due 1/3/23. SS to continue to follow and assist as needed.     Discharge Plan     Row Name 12/30/22 1544       Plan    Plan Pt was admitted to swing bed on 10/5/22. Pt's insurance has been approved on 12/28/22 additional swing bed days through 1/4/2023 with clinical updates due 1/3/23. SS to continue to follow and assist as needed.              XANDER Craig

## 2022-12-31 LAB
TROPONIN T SERPL-MCNC: 0.01 NG/ML (ref 0–0.03)
TROPONIN T SERPL-MCNC: <0.01 NG/ML (ref 0–0.03)

## 2022-12-31 PROCEDURE — 84484 ASSAY OF TROPONIN QUANT: CPT | Performed by: STUDENT IN AN ORGANIZED HEALTH CARE EDUCATION/TRAINING PROGRAM

## 2022-12-31 PROCEDURE — 63710000001 PROCHLORPERAZINE MALEATE PER 5 MG: Performed by: INTERNAL MEDICINE

## 2022-12-31 PROCEDURE — 97110 THERAPEUTIC EXERCISES: CPT

## 2022-12-31 PROCEDURE — 93005 ELECTROCARDIOGRAM TRACING: CPT | Performed by: STUDENT IN AN ORGANIZED HEALTH CARE EDUCATION/TRAINING PROGRAM

## 2022-12-31 RX ORDER — NITROGLYCERIN 0.4 MG/1
0.4 TABLET SUBLINGUAL ONCE
Status: COMPLETED | OUTPATIENT
Start: 2022-12-31 | End: 2022-12-31

## 2022-12-31 RX ORDER — ISOSORBIDE MONONITRATE 30 MG/1
30 TABLET, EXTENDED RELEASE ORAL
Status: DISCONTINUED | OUTPATIENT
Start: 2022-12-31 | End: 2023-01-13 | Stop reason: HOSPADM

## 2022-12-31 RX ORDER — CALCIUM CARBONATE 200(500)MG
2 TABLET,CHEWABLE ORAL 3 TIMES DAILY PRN
Status: DISCONTINUED | OUTPATIENT
Start: 2022-12-31 | End: 2023-01-13 | Stop reason: HOSPADM

## 2022-12-31 RX ADMIN — MAGNESIUM GLUCONATE 500 MG ORAL TABLET 400 MG: 500 TABLET ORAL at 09:00

## 2022-12-31 RX ADMIN — DICLOFENAC 4 G: 10 GEL TOPICAL at 09:00

## 2022-12-31 RX ADMIN — HYDROCODONE BITARTRATE AND ACETAMINOPHEN 1 TABLET: 7.5; 325 TABLET ORAL at 08:59

## 2022-12-31 RX ADMIN — METOPROLOL TARTRATE 25 MG: 25 TABLET, FILM COATED ORAL at 08:59

## 2022-12-31 RX ADMIN — Medication 10 MG: at 20:44

## 2022-12-31 RX ADMIN — ESCITALOPRAM 10 MG: 10 TABLET, FILM COATED ORAL at 08:59

## 2022-12-31 RX ADMIN — FUROSEMIDE 40 MG: 40 TABLET ORAL at 17:51

## 2022-12-31 RX ADMIN — ANTACID TABLETS 2 TABLET: 500 TABLET, CHEWABLE ORAL at 19:08

## 2022-12-31 RX ADMIN — TAMSULOSIN HYDROCHLORIDE 0.4 MG: 0.4 CAPSULE ORAL at 09:00

## 2022-12-31 RX ADMIN — DICLOFENAC 4 G: 10 GEL TOPICAL at 17:48

## 2022-12-31 RX ADMIN — Medication 10 ML: at 21:45

## 2022-12-31 RX ADMIN — METOPROLOL TARTRATE 25 MG: 25 TABLET, FILM COATED ORAL at 20:43

## 2022-12-31 RX ADMIN — GABAPENTIN 300 MG: 300 CAPSULE ORAL at 20:51

## 2022-12-31 RX ADMIN — HYDRALAZINE HYDROCHLORIDE 5 MG: 25 TABLET, FILM COATED ORAL at 06:37

## 2022-12-31 RX ADMIN — APIXABAN 5 MG: 5 TABLET, FILM COATED ORAL at 09:00

## 2022-12-31 RX ADMIN — DICLOFENAC 4 G: 10 GEL TOPICAL at 12:26

## 2022-12-31 RX ADMIN — NITROGLYCERIN 0.4 MG: 0.4 TABLET, ORALLY DISINTEGRATING SUBLINGUAL at 18:41

## 2022-12-31 RX ADMIN — NITROGLYCERIN 0.4 MG: 0.4 TABLET, ORALLY DISINTEGRATING SUBLINGUAL at 18:58

## 2022-12-31 RX ADMIN — DICLOFENAC 4 G: 10 GEL TOPICAL at 20:44

## 2022-12-31 RX ADMIN — Medication 1 TABLET: at 09:00

## 2022-12-31 RX ADMIN — ISOSORBIDE MONONITRATE 30 MG: 30 TABLET, EXTENDED RELEASE ORAL at 20:51

## 2022-12-31 RX ADMIN — FUROSEMIDE 40 MG: 40 TABLET ORAL at 08:59

## 2022-12-31 RX ADMIN — DOCUSATE SODIUM 100 MG: 100 CAPSULE ORAL at 20:44

## 2022-12-31 RX ADMIN — HYDRALAZINE HYDROCHLORIDE 5 MG: 25 TABLET, FILM COATED ORAL at 14:14

## 2022-12-31 RX ADMIN — PROCHLORPERAZINE MALEATE 5 MG: 5 TABLET ORAL at 18:18

## 2022-12-31 RX ADMIN — DOCUSATE SODIUM 100 MG: 100 CAPSULE ORAL at 08:59

## 2022-12-31 RX ADMIN — PANTOPRAZOLE SODIUM 40 MG: 40 TABLET, DELAYED RELEASE ORAL at 06:37

## 2022-12-31 RX ADMIN — APIXABAN 5 MG: 5 TABLET, FILM COATED ORAL at 20:43

## 2022-12-31 NOTE — PROGRESS NOTES
Patient currently in swing bed for additional therapy in the setting of generalized weakness and debility.  Patient not seen and examined today.  Patient initially admitted to swing bed on 10/5 and approved for additional swing bed days through 1/4/2023.  We will continue with PT and OT.  Continues to progress well physical therapy.  Patient does complain of occasional pain in her lower back.  If persists or begins having dysuria would have low threshold to screen for UTI.  Patient to be continued on Eliquis for treatment of DVT.

## 2022-12-31 NOTE — PLAN OF CARE
Goal Outcome Evaluation:  Plan of Care Reviewed With: patient        Progress: no change  Outcome Evaluation: Patient's VSS. Pt ambulated to the chair & bedside throughout the shift. Pt c/o of pain during shift & PRN pain medication given. Pt stated pain medication relieved her pain. Pt voiced no further concerns at this time.

## 2022-12-31 NOTE — SIGNIFICANT NOTE
12/31/22 0955   OTHER   Discipline physical therapist   Rehab Time/Intention   Session Not Performed patient/family declined, not feeling well  (Pt c/o low back pain and overall feeling poorly. Declined therapy treatment session this date.)

## 2022-12-31 NOTE — THERAPY TREATMENT NOTE
Acute Care - Occupational Therapy Treatment Note   [place]     Patient Name: Tre Kinney  : 1932  MRN: [id number]  Today's Date: 2022  Onset of Illness/Injury or Date of Surgery: 10/06/22     Referring Physician: Janneth    Admit Date: 10/5/2022     No diagnosis found.  Patient Active Problem List   Diagnosis   • Acid reflux   • Ankle arthralgia   • Cardiac conduction disorder   • Closed fracture of distal fibula   • Arteriosclerosis of coronary artery   • Fracture of distal end of tibia   • Elevated cholesterol   • BP (high blood pressure)   • Hypertrophic polyarthritis   • OP (osteoporosis)   • Right heart failure (HCC)   • Heart failure, chronic, right-sided (HCC)   • SOB (shortness of breath)   • Palpitations   • CHF (congestive heart failure) (HCC)   • Congestive heart failure (HCC)   • Precordial pain   • Chronic anticoagulation   • Hypokalemia   • Anemia, unspecified type   • Generalized weakness     Past Medical History:   Diagnosis Date   • Anemia    • Arthritis    • CHF (congestive heart failure) ([facility])    • Chronic kidney disease    • Coronary artery disease    • H/O blood clots    • History of transfusion     no reaction    • Hypertension      Past Surgical History:   Procedure Laterality Date   • ABDOMINAL SURGERY     • APPENDECTOMY     • CARDIAC SURGERY     •  SECTION     • COLONOSCOPY N/A 3/7/2018    Procedure: COLONOSCOPY;  Surgeon: Willie Gresham MD;  Location:  [facility];  Service:    • CORONARY ANGIOPLASTY WITH STENT PLACEMENT     • ENDOSCOPY N/A 3/6/2018    Procedure: ESOPHAGOGASTRODUODENOSCOPY;  Surgeon: Willie Gresham MD;  Location:  [facility];  Service:    • PACEMAKER IMPLANTATION      left side    • SKIN BIOPSY     • TUBAL ABDOMINAL LIGATION           OT ASSESSMENT FLOWSHEET (last 12 hours)     OT Evaluation and Treatment     Row Name 22 1059                   OT Time and Intention    Subjective Information pain  -KP        Document Type therapy  note (daily note)  -KP        Mode of Treatment individual therapy;occupational therapy  -KP        Total Minutes, Occupational Therapy 18  -KP        Patient Effort good  -KP        Symptoms Noted During/After Treatment increased pain  -KP        Comment Patient in bed upon arrival and declined out of bed treatment at this time stating increased pain in lower back and not resting well the night before. Patient declined calling son during this session since she was performing BUE exercise from bed level only. She was seen 912 to 930.  -           General Information    Patient Profile Reviewed yes  -KP        Existing Precautions/Restrictions fall  -KP        Limitations/Impairments safety/cognitive  -           Pain Assessment    Pretreatment Pain Rating 3/10  -KP        Posttreatment Pain Rating 3/10  -        Pain Location --  lower back  -        Pre/Posttreatment Pain Comment Patient reports pain medication administered prior to OT arrival.  Lower extremities elevated in bed to alleviate pain upon therapist departure.  -           Cognition    Affect/Mental Status (Cognition) WFL  -KP        Orientation Status (Cognition) oriented x 4  -KP        Follows Commands (Cognition) WFL  -           Motor Skills    Therapeutic Exercise other (see comments)  BUE exercise from bed level with red resistance band X20 X2 through functional movement patterns (shoulder flexion AROM)  -           Plan of Care Review    Plan of Care Reviewed With patient  -KP        Progress improving  -KP           Positioning and Restraints    Pre-Treatment Position in bed  -KP        Post Treatment Position bed  -KP        In Bed call light within reach  -KP              User Key  (r) = Recorded By, (t) = Taken By, (c) = Cosigned By    Initials Name Effective Dates    Linn Richardson, VELASQUEZ 06/16/21 -                        OT Recommendation and Plan     Progress Toward Functional Goals (OT): progress toward functional goals is  fair  Plan of Care Review  Plan of Care Reviewed With: patient  Progress: improving  Outcome Evaluation: Patient seen for OT treatment session 8:55 to 9:44. Initial session addressed functional transfers/mobility with PT present. Then OT addressed UE strength, endurance, and ROM through seated exercises. She then was seen for ADL retraining as documented for toileting. Continue plan of care.  Plan of Care Reviewed With: patient  Outcome Evaluation: Patient seen for OT treatment session 8:55 to 9:44. Initial session addressed functional transfers/mobility with PT present. Then OT addressed UE strength, endurance, and ROM through seated exercises. She then was seen for ADL retraining as documented for toileting. Continue plan of care.     Outcome Measures     Row Name 12/31/22 1103 12/30/22 1819 12/29/22 1500       How much help from another is currently needed...    Putting on and taking off regular lower body clothing? 3  -KP 3  -KR 3  -LM    Bathing (including washing, rinsing, and drying) 3  -KP 3  -KR 3  -LM    Toileting (which includes using toilet bed pan or urinal) 3  -KP 3  -KR 3  -LM    Putting on and taking off regular upper body clothing 3  -KP 3  -KR 3  -LM    Taking care of personal grooming (such as brushing teeth) 3  -KP 3  -KR 3  -LM    Eating meals 4  -KP 4  -KR 4  -LM    AM-PAC 6 Clicks Score (OT) 19  -KP 19  -KR 19  -LM       Functional Assessment    Outcome Measure Options -- -- AM-PAC 6 Clicks Daily Activity (OT)  -LM    Row Name 12/28/22 1530             How much help from another is currently needed...    Putting on and taking off regular lower body clothing? 3  -KP      Bathing (including washing, rinsing, and drying) 3  -KP      Toileting (which includes using toilet bed pan or urinal) 3  -KP      Putting on and taking off regular upper body clothing 3  -KP      Taking care of personal grooming (such as brushing teeth) 3  -KP      Eating meals 4  -KP      AM-PAC 6 Clicks Score (OT) 19  -KP             User Key  (r) = Recorded By, (t) = Taken By, (c) = Cosigned By    Initials Name Provider Type    Andres Ayala, OT Occupational Therapist     Татьяна Bah, OT Occupational Therapist    Linn Richardson OT Occupational Therapist                Time Calculation:    Time Calculation- OT     Row Name 12/31/22 1103             Time Calculation- OT    OT Start Time 0912  -      OT Stop Time 0930  -      OT Time Calculation (min) 18 min  -      Total Timed Code Minutes- OT 18 minute(s)  -      OT Received On 12/31/22  -            User Key  (r) = Recorded By, (t) = Taken By, (c) = Cosigned By    Initials Name Provider Type    Linn Richardson OT Occupational Therapist              Therapy Charges for Today     Code Description Service Date Service Provider Modifiers Qty    84018193353 HC OT THER PROC EA 15 MIN 12/31/2022 Linn Quevedo OT GO 1               Linn Quevedo OT  12/31/2022

## 2022-12-31 NOTE — PLAN OF CARE
Goal Outcome Evaluation:      Patient resting comfortably in bed with no acute changes this shift. Will continue to monitor.

## 2023-01-01 LAB
QT INTERVAL: 464 MS
QT INTERVAL: 464 MS
QTC INTERVAL: 532 MS
QTC INTERVAL: 539 MS

## 2023-01-01 RX ADMIN — FUROSEMIDE 40 MG: 40 TABLET ORAL at 17:20

## 2023-01-01 RX ADMIN — DICLOFENAC 4 G: 10 GEL TOPICAL at 17:20

## 2023-01-01 RX ADMIN — Medication 10 MG: at 20:16

## 2023-01-01 RX ADMIN — GLYCERIN 1 G: 1 SUPPOSITORY RECTAL at 20:16

## 2023-01-01 RX ADMIN — Medication 10 ML: at 20:17

## 2023-01-01 RX ADMIN — PANTOPRAZOLE SODIUM 40 MG: 40 TABLET, DELAYED RELEASE ORAL at 06:10

## 2023-01-01 RX ADMIN — APIXABAN 5 MG: 5 TABLET, FILM COATED ORAL at 20:16

## 2023-01-01 RX ADMIN — DICLOFENAC 4 G: 10 GEL TOPICAL at 08:19

## 2023-01-01 RX ADMIN — MAGNESIUM GLUCONATE 500 MG ORAL TABLET 400 MG: 500 TABLET ORAL at 08:19

## 2023-01-01 RX ADMIN — Medication 10 ML: at 08:20

## 2023-01-01 RX ADMIN — TAMSULOSIN HYDROCHLORIDE 0.4 MG: 0.4 CAPSULE ORAL at 08:19

## 2023-01-01 RX ADMIN — DICLOFENAC 4 G: 10 GEL TOPICAL at 20:17

## 2023-01-01 RX ADMIN — FUROSEMIDE 40 MG: 40 TABLET ORAL at 08:19

## 2023-01-01 RX ADMIN — Medication 1 TABLET: at 08:20

## 2023-01-01 RX ADMIN — APIXABAN 5 MG: 5 TABLET, FILM COATED ORAL at 08:19

## 2023-01-01 RX ADMIN — GABAPENTIN 300 MG: 300 CAPSULE ORAL at 20:16

## 2023-01-01 RX ADMIN — DOCUSATE SODIUM 100 MG: 100 CAPSULE ORAL at 08:20

## 2023-01-01 RX ADMIN — METOPROLOL TARTRATE 25 MG: 25 TABLET, FILM COATED ORAL at 08:20

## 2023-01-01 RX ADMIN — DOCUSATE SODIUM 100 MG: 100 CAPSULE ORAL at 20:16

## 2023-01-01 RX ADMIN — DICLOFENAC 4 G: 10 GEL TOPICAL at 11:43

## 2023-01-01 RX ADMIN — METOPROLOL TARTRATE 25 MG: 25 TABLET, FILM COATED ORAL at 20:16

## 2023-01-01 RX ADMIN — ESCITALOPRAM 10 MG: 10 TABLET, FILM COATED ORAL at 08:19

## 2023-01-01 RX ADMIN — ISOSORBIDE MONONITRATE 30 MG: 30 TABLET, EXTENDED RELEASE ORAL at 08:19

## 2023-01-01 NOTE — PLAN OF CARE
Goal Outcome Evaluation:  Plan of Care Reviewed With: patient        Progress: no change  Outcome Evaluation: no complaints of chest pain this shift. imdur given. tele monitor in place. pt constipated. laxative offered. pt states she will take laxative after breakfast so she can attempt to rest but pt not resting well tonight. will continue with poc

## 2023-01-01 NOTE — PLAN OF CARE
Goal Outcome Evaluation:           Progress: no change  Outcome Evaluation: pt has been resting in bed. no changes to note at this time; will continue to monitor

## 2023-01-01 NOTE — NURSING NOTE
Offered pt PRN suppository. Pt refused and said she would rather do it later tonight, since her family is coming to visit today.

## 2023-01-01 NOTE — PROGRESS NOTES
Patient currently in swing bed for additional therapy in the setting of generalized weakness and debility.  Patient not seen and examined today.  However patient was seen and examined the evening prior due to complaints of chest pain.  EKG and troponin were all negative and patient's chest pain did resolve on its own later in the evening after previously getting sublingual nitro and Tums at time of the event.  Of note patient previously on Imdur 60 mg and previously held and have resumed on 30 mg and patient tolerating well.  Nursing staff report no issues or recurrence of chest pain since yesterday evening's event.  Patient initially admitted to swing bed on 10/5 and approved for additional swing bed days through 1/4/2023.  We will continue with PT and OT.  Continues to progress well with physical therapy.  Patient to be continued on Eliquis for treatment of DVT.

## 2023-01-02 PROCEDURE — 99308 SBSQ NF CARE LOW MDM 20: CPT | Performed by: INTERNAL MEDICINE

## 2023-01-02 PROCEDURE — 97110 THERAPEUTIC EXERCISES: CPT

## 2023-01-02 PROCEDURE — 97116 GAIT TRAINING THERAPY: CPT

## 2023-01-02 PROCEDURE — 97530 THERAPEUTIC ACTIVITIES: CPT

## 2023-01-02 RX ADMIN — Medication 10 MG: at 20:07

## 2023-01-02 RX ADMIN — DICLOFENAC 4 G: 10 GEL TOPICAL at 17:07

## 2023-01-02 RX ADMIN — Medication 10 ML: at 08:32

## 2023-01-02 RX ADMIN — PANTOPRAZOLE SODIUM 40 MG: 40 TABLET, DELAYED RELEASE ORAL at 05:34

## 2023-01-02 RX ADMIN — ISOSORBIDE MONONITRATE 30 MG: 30 TABLET, EXTENDED RELEASE ORAL at 08:32

## 2023-01-02 RX ADMIN — METOPROLOL TARTRATE 25 MG: 25 TABLET, FILM COATED ORAL at 08:32

## 2023-01-02 RX ADMIN — METOPROLOL TARTRATE 25 MG: 25 TABLET, FILM COATED ORAL at 20:07

## 2023-01-02 RX ADMIN — FUROSEMIDE 40 MG: 40 TABLET ORAL at 08:32

## 2023-01-02 RX ADMIN — APIXABAN 5 MG: 5 TABLET, FILM COATED ORAL at 08:32

## 2023-01-02 RX ADMIN — DICLOFENAC 4 G: 10 GEL TOPICAL at 20:07

## 2023-01-02 RX ADMIN — GABAPENTIN 300 MG: 300 CAPSULE ORAL at 20:08

## 2023-01-02 RX ADMIN — Medication 1 TABLET: at 08:32

## 2023-01-02 RX ADMIN — FUROSEMIDE 40 MG: 40 TABLET ORAL at 17:07

## 2023-01-02 RX ADMIN — DICLOFENAC 4 G: 10 GEL TOPICAL at 11:59

## 2023-01-02 RX ADMIN — TAMSULOSIN HYDROCHLORIDE 0.4 MG: 0.4 CAPSULE ORAL at 08:32

## 2023-01-02 RX ADMIN — ESCITALOPRAM 10 MG: 10 TABLET, FILM COATED ORAL at 08:32

## 2023-01-02 RX ADMIN — DICLOFENAC 4 G: 10 GEL TOPICAL at 08:32

## 2023-01-02 RX ADMIN — MAGNESIUM GLUCONATE 500 MG ORAL TABLET 400 MG: 500 TABLET ORAL at 08:32

## 2023-01-02 RX ADMIN — DOCUSATE SODIUM 100 MG: 100 CAPSULE ORAL at 08:32

## 2023-01-02 RX ADMIN — Medication 10 ML: at 08:33

## 2023-01-02 RX ADMIN — APIXABAN 5 MG: 5 TABLET, FILM COATED ORAL at 20:07

## 2023-01-02 RX ADMIN — DOCUSATE SODIUM 100 MG: 100 CAPSULE ORAL at 20:07

## 2023-01-02 NOTE — THERAPY TREATMENT NOTE
Acute Care - Physical Therapy Treatment Note   [place]     Patient Name: Tre Kinney  : 1932  MRN: [id number]  Today's Date: 2023   Onset of Illness/Injury or Date of Surgery: 10/06/22  Visit Dx:   No diagnosis found.  Patient Active Problem List   Diagnosis   • Acid reflux   • Ankle arthralgia   • Cardiac conduction disorder   • Closed fracture of distal fibula   • Arteriosclerosis of coronary artery   • Fracture of distal end of tibia   • Elevated cholesterol   • BP (high blood pressure)   • Hypertrophic polyarthritis   • OP (osteoporosis)   • Right heart failure (HCC)   • Heart failure, chronic, right-sided (HCC)   • SOB (shortness of breath)   • Palpitations   • CHF (congestive heart failure) (HCC)   • Congestive heart failure (HCC)   • Precordial pain   • Chronic anticoagulation   • Hypokalemia   • Anemia, unspecified type   • Generalized weakness     Past Medical History:   Diagnosis Date   • Anemia    • Arthritis    • CHF (congestive heart failure) (HCC)    • Chronic kidney disease    • Coronary artery disease    • H/O blood clots    • History of transfusion     no reaction    • Hypertension      Past Surgical History:   Procedure Laterality Date   • ABDOMINAL SURGERY     • APPENDECTOMY     • CARDIAC SURGERY     •  SECTION     • COLONOSCOPY N/A 3/7/2018    Procedure: COLONOSCOPY;  Surgeon: Willie Gresham MD;  Location:  [facility];  Service:    • CORONARY ANGIOPLASTY WITH STENT PLACEMENT     • ENDOSCOPY N/A 3/6/2018    Procedure: ESOPHAGOGASTRODUODENOSCOPY;  Surgeon: Willie Gresham MD;  Location:  [facility];  Service:    • PACEMAKER IMPLANTATION      left side    • SKIN BIOPSY     • TUBAL ABDOMINAL LIGATION       PT Assessment (last 12 hours)     PT Evaluation and Treatment     Row Name 23 1352          Physical Therapy Time and Intention    Subjective Information complains of;weakness;fatigue  -CT     Document Type therapy note (daily note)  -CT     Mode of Treatment  physical therapy;individual therapy  -CT     Patient Effort good  -CT     Comment Pt reports feeling better this date and worked with therapy well. Pt treatment session [id number]. Pt ambulated x 2 and performed seated and standing ther ex.  -CT     Row Name 01/02/23 1352          General Information    Patient Profile Reviewed yes  -CT     Row Name 01/02/23 1352          Cognition    Affect/Mental Status (Cognition) WFL  -CT     Orientation Status (Cognition) oriented x 4  -CT     Follows Commands (Cognition) WFL  -CT     Row Name 01/02/23 1352          Bed Mobility    Comment, (Bed Mobility) pt up in chair  -CT     Row Name 01/02/23 1352          Transfers    Transfers sit-stand transfer;stand-sit transfer  -CT     Row Name 01/02/23 1352          Sit-Stand Transfer    Sit-Stand [place] (Transfers) minimum assist (75% patient effort);verbal cues  -CT     Assistive Device (Sit-Stand Transfers) walker, front-wheeled  -CT     Row Name 01/02/23 1352          Stand-Sit Transfer    Stand-Sit [place] (Transfers) minimum assist (75% patient effort)  -CT     Assistive Device (Stand-Sit Transfers) walker, front-wheeled  -CT     Row Name 01/02/23 1352          Toilet Transfer    Type (Toilet Transfer) sit-stand;stand-sit;stand pivot/stand step  -CT     [place]oca Level (Toilet Transfer) minimum assist (75% patient effort)  -CT     Row Name 01/02/23 1352          Gait/Stairs (Locomotion)    [place] Level (Gait) minimum assist (75% patient effort);1 person assist;verbal cues;nonverbal cues (demo/gesture)  -CT     Assistive Device (Gait) walker, front-wheeled  -CT     Distance in Feet (Gait) 14, 16  -CT     Pattern (Gait) step-to  -CT     Deviations/Abnormal Patterns (Gait) stride length decreased;gait speed decreased;festinating/shuffling;weight shifting decreased  -CT     Bilateral Gait Deviations weight shift ability decreased;forward flexed posture  -CT     Row Name 01/02/23 1352          Motor Skills     Therapeutic Exercise --  seated ther ex: seated marches, LAQ, AP, hip abd/add 25 reps; standing ther ex: standing marches, heel raises 20 reps  -CT     Row Name 01/02/23 1352          Coping    Observed Emotional State calm;cooperative;pleasant  -CT     Verbalized Emotional State acceptance  -CT     Row Name 01/02/23 1352          Plan of Care Review    Plan of Care Reviewed With patient  -CT     Row Name 01/02/23 1352          Positioning and Restraints    Pre-Treatment Position sitting in chair/recliner  -CT     Post Treatment Position chair  -CT     In Chair sitting;call light within reach;encouraged to call for assist;notified nsg  -CT           User Key  (r) = Recorded By, (t) = Taken By, (c) = Cosigned By    Initials Name Provider Type    CT Bhargavi Vásquez PT Physical Therapist                Physical Therapy Education     Title: PT OT SLP Therapies (Done)     Topic: Physical Therapy (Done)     Point: Mobility training (Done)     Learning Progress Summary           Patient Acceptance, TB,E, VU,DU by BD at 1/2/2023 0907    Acceptance, TB,E, VU,DU by BD at 1/1/2023 0846    Acceptance, E,TB, VU by RM at 12/30/2022 1035    Acceptance, E,TB, VU by AC at 12/28/2022 1505    Acceptance, E,TB, VU by AC at 12/27/2022 1513    Acceptance, E, VU by CL at 12/26/2022 1325    Acceptance, E, VU by CL at 12/22/2022 1826    Acceptance, E, VU by CL at 12/21/2022 1303    Acceptance, E, VU by EB at 12/20/2022 1644    Acceptance, E, VU by CL at 12/17/2022 1703    Acceptance, E,D, VU,NR by LL at 12/16/2022 1605    Acceptance, E,TB, VU by AC at 12/16/2022 1523    Acceptance, E,TB, VU by AC at 12/15/2022 1618    Acceptance, E,TB, VU by AC at 12/14/2022 1503    Acceptance, E,TB, VU by DL at 12/13/2022 0240    Acceptance, D,E, VU,NR by LL at 12/12/2022 1455    Acceptance, E,TB, VU by RW at 12/9/2022 1451    Acceptance, E,TB, VU by BC at 11/27/2022 1319    Acceptance, E,TB, VU by RM at 11/20/2022 1449    Acceptance, E,TB, VU by RM at  11/18/2022 1021    Acceptance, E,TB, VU by RH at 11/16/2022 1110    Acceptance, E,TB, VU by KK at 11/14/2022 1638    Acceptance, E,TB, VU by KK at 11/13/2022 1648    Acceptance, E,TB, VU by KG at 11/10/2022 1827    Acceptance, E,TB, VU by DM at 11/9/2022 1638    Acceptance, E,TB, VU by KK at 11/8/2022 1754    Acceptance, E,TB, VU by KK at 11/7/2022 1635    Acceptance, E,D, VU,NR by LL at 11/5/2022 1325    Acceptance, E, VU by EB at 11/4/2022 1448    Acceptance, E,TB, VU by KM at 11/4/2022 1101   Family Acceptance, D,E, VU,NR by LL at 12/12/2022 1455                   Point: Home exercise program (Done)     Learning Progress Summary           Patient Acceptance, TB,E, VU,DU by BD at 1/2/2023 0907    Acceptance, TB,E, VU,DU by BD at 1/1/2023 0846    Acceptance, E,TB, VU by RM at 12/30/2022 1035    Acceptance, E,TB, VU by AC at 12/28/2022 1505    Acceptance, E,TB, VU by AC at 12/27/2022 1513    Acceptance, E, VU by CL at 12/26/2022 1325    Acceptance, E, VU by CL at 12/22/2022 1826    Acceptance, E, VU by CL at 12/21/2022 1303    Acceptance, E, VU by EB at 12/20/2022 1644    Acceptance, E, VU by CL at 12/17/2022 1703    Acceptance, E,D, VU,NR by LL at 12/16/2022 1605    Acceptance, E,TB, VU by AC at 12/16/2022 1523    Acceptance, E,TB, VU by AC at 12/15/2022 1618    Acceptance, E,TB, VU by AC at 12/14/2022 1503    Acceptance, E,TB, VU by DL at 12/13/2022 0240    Acceptance, D,E, VU,NR by LL at 12/12/2022 1455    Acceptance, E,TB, VU by RW at 12/9/2022 1451    Acceptance, E,TB, VU by BC at 11/27/2022 1319    Acceptance, E,TB, VU by RM at 11/20/2022 1449    Acceptance, E,TB, VU by RM at 11/18/2022 1021    Acceptance, E,TB, VU by RH at 11/16/2022 1110    Acceptance, E,TB, VU by KK at 11/14/2022 1638    Acceptance, E,TB, VU by KK at 11/13/2022 1648    Acceptance, E,TB, VU by KG at 11/10/2022 1827    Acceptance, E,TB, VU by DM at 11/9/2022 1638    Acceptance, E,TB, VU by KK at 11/8/2022 1754    Acceptance, E,TB, VU by KK at  11/7/2022 1635    Acceptance, E,D, VU,NR by LL at 11/5/2022 1325    Acceptance, E, VU by EB at 11/4/2022 1448    Acceptance, E,TB, VU by KM at 11/4/2022 1101   Family Acceptance, D,E, VU,NR by LL at 12/12/2022 1455                   Point: Body mechanics (Done)     Learning Progress Summary           Patient Acceptance, TB,E, VU,DU by BD at 1/2/2023 0907    Acceptance, TB,E, VU,DU by BD at 1/1/2023 0846    Acceptance, E,TB, VU by RM at 12/30/2022 1035    Acceptance, E,TB, VU by AC at 12/28/2022 1505    Acceptance, E,TB, VU by AC at 12/27/2022 1513    Acceptance, E, VU by CL at 12/26/2022 1325    Acceptance, E, VU by CL at 12/22/2022 1826    Acceptance, E, VU by CL at 12/21/2022 1303    Acceptance, E, VU by EB at 12/20/2022 1644    Acceptance, E, VU by CL at 12/17/2022 1703    Acceptance, E,D, VU,NR by LL at 12/16/2022 1605    Acceptance, E,TB, VU by AC at 12/16/2022 1523    Acceptance, E,TB, VU by AC at 12/15/2022 1618    Acceptance, E,TB, VU by AC at 12/14/2022 1503    Acceptance, E,TB, VU by DL at 12/13/2022 0240    Acceptance, D,E, VU,NR by LL at 12/12/2022 1455    Acceptance, E,TB, VU by RW at 12/9/2022 1451    Acceptance, E,TB, VU by BC at 11/27/2022 1319    Acceptance, E,TB, VU by RM at 11/20/2022 1449    Acceptance, E,TB, VU by RM at 11/18/2022 1021    Acceptance, E,TB, VU by RH at 11/16/2022 1110    Acceptance, E,TB, VU by KK at 11/14/2022 1638    Acceptance, E,TB, VU by KK at 11/13/2022 1648    Acceptance, E,TB, VU by KG at 11/10/2022 1827    Acceptance, E,TB, VU by DM at 11/9/2022 1638    Acceptance, E,TB, VU by KK at 11/8/2022 1754    Acceptance, E,TB, VU by KK at 11/7/2022 1635    Acceptance, E,D, VU,NR by LL at 11/5/2022 1325    Acceptance, E, VU by EB at 11/4/2022 1448    Acceptance, E,TB, VU by KM at 11/4/2022 1101   Family Acceptance, D,E, VU,NR by LL at 12/12/2022 1455                   Point: Precautions (Done)     Learning Progress Summary           Patient Acceptance, TB,E, VU,DU by BD at  1/2/2023 0907    Acceptance, TB,E, VU,DU by BD at 1/1/2023 0846    Acceptance, E,TB, VU by RM at 12/30/2022 1035    Acceptance, E,TB, VU by AC at 12/28/2022 1505    Acceptance, E,TB, VU by AC at 12/27/2022 1513    Acceptance, E, VU by CL at 12/26/2022 1325    Acceptance, E, VU by CL at 12/22/2022 1826    Acceptance, E, VU by CL at 12/21/2022 1303    Acceptance, E, VU by EB at 12/20/2022 1644    Acceptance, E, VU by CL at 12/17/2022 1703    Acceptance, E,D, VU,NR by LL at 12/16/2022 1605    Acceptance, E,TB, VU by AC at 12/16/2022 1523    Acceptance, E,TB, VU by AC at 12/15/2022 1618    Acceptance, E,TB, VU by AC at 12/14/2022 1503    Acceptance, E,TB, VU by DL at 12/13/2022 0240    Acceptance, D,E, VU,NR by LL at 12/12/2022 1455    Acceptance, E,TB, VU by RW at 12/9/2022 1451    Acceptance, E,TB, VU by BC at 11/27/2022 1319    Acceptance, E,TB, VU by RM at 11/20/2022 1449    Acceptance, E,TB, VU by RM at 11/18/2022 1021    Acceptance, E,TB, VU by RH at 11/16/2022 1110    Acceptance, E,TB, VU by KK at 11/14/2022 1638    Acceptance, E,TB, VU by KK at 11/13/2022 1648    Acceptance, E,TB, VU by KG at 11/10/2022 1827    Acceptance, E,TB, VU by DM at 11/9/2022 1638    Acceptance, E,TB, VU by KK at 11/8/2022 1754    Acceptance, E,TB, VU by KK at 11/7/2022 1635    Acceptance, E,D, VU,NR by LL at 11/5/2022 1325    Acceptance, E, VU by EB at 11/4/2022 1448    Acceptance, E,TB, VU by KM at 11/4/2022 1101   Family Acceptance, D,E, VU,NR by LL at 12/12/2022 1455                               User Key     Initials Effective Dates Name Provider Type Discipline    CL 06/16/21 -  Mitzy Mustafa, RN Registered Nurse Nurse    LL 05/02/16 -  Neda Garcia PTA Physical Therapist Assistant PT    RM 06/14/22 -  Bethany Krause RN Registered Nurse Nurse    DM 06/16/21 -  Moniz, Deborah, RN Registered Nurse Nurse    BC 08/02/22 -  Collett, Brittany, RN Registered Nurse Nurse    AC 10/13/22 -  Jaya Castillo, RN Registered Nurse  Nurse    BD 08/05/21 -  Radha Lynn, RN Registered Nurse Nurse    KM 05/24/22 -  Mao Vázquez, PT Physical Therapist PT    EB 09/22/22 -  Cedric Oliver, RN Registered Nurse Nurse    KK 06/27/22 -  Chelsi Bolivar, RN Registered Nurse Nurse    DL 03/16/22 -  Francy Garcia, RN Registered Nurse Nurse    KG 09/22/22 -  Verónica Rodriguez, RN Registered Nurse Nurse    RW 09/22/22 -  Bethany Schofield RN Registered Nurse Nurse    RH 08/03/22 - 12/21/22 Jung Russell, SONAM Extern Registered Nurse Nurse              PT Recommendation and Plan  Anticipated Discharge Disposition (PT): home with 24/7 [facility], [facility] nursing [facility]  Planned Therapy Interventions (PT): balance training, bed mobility training, gait training, home exercise program, manual therapy techniques, motor coordination training, neuromuscular re-education, patient/family education, postural re-education, strengthening, transfer training  Therapy Frequency (PT): 5 times/wk (5-6 times/wk)  Progress Summary (PT)  Progress Toward Functional Goals (PT): progress toward functional goals is gradual  Plan of Care Reviewed With: patient  Progress: improving   Outcome Measures     Row Name 12/31/22 1103 12/30/22 1819          How much help from another is currently needed...    Putting on and taking off regular lower body clothing? 3  -KP 3  -KR     Bathing (including washing, rinsing, and drying) 3  -KP 3  -KR     Toileting (which includes using toilet bed pan or urinal) 3  -KP 3  -KR     Putting on and taking off regular upper body clothing 3  -KP 3  -KR     Taking care of personal grooming (such as brushing teeth) 3  -KP 3  -KR     Eating meals 4  -KP 4  -KR     AM-PAC 6 Clicks Score (OT) 19  -KP 19  -KR           User Key  (r) = Recorded By, (t) = Taken By, (c) = Cosigned By    Initials Name Provider Type    Andres Ayala, OT Occupational Therapist    Linn Richardson, VELASQUEZ Occupational Therapist                 Time Calculation:    PT Charges     Row Name  01/02/23 1406             Time Calculation    Start Time 1255  -CT      Stop Time 1335  -CT      Time Calculation (min) 40 min  -CT      PT Received On 01/02/23  -CT            User Key  (r) = Recorded By, (t) = Taken By, (c) = Cosigned By    Initials Name Provider Type    CT Bhargavi Vásquez, EDEL Physical Therapist              Therapy Charges for Today     Code Description Service Date Service Provider Modifiers Qty    05384732214 HC GAIT TRAINING EA 15 MIN 1/2/2023 Bhargavi Vásquez, PT GP 1    59140212681 HC PT THERAPEUTIC ACT EA 15 MIN 1/2/2023 Bhargavi Vásquez, PT GP 1    20507327055 HC PT THER PROC EA 15 MIN 1/2/2023 Bhargavi Vásquez, PT GP 1          PT G-Codes  Outcome Measure Options: AM-PAC 6 Clicks Daily Activity (OT)  AM-PAC 6 Clicks Score (PT): 14  AM-PAC 6 Clicks Score (OT): 19    Bhargavi Vásquez PT  1/2/2023

## 2023-01-02 NOTE — THERAPY TREATMENT NOTE
Acute Care - Occupational Therapy Treatment Note   [place]     Patient Name: Tre Kinney  : 1932  MRN: [id number]  Today's Date: 2023  Onset of Illness/Injury or Date of Surgery: 10/06/22     Referring Physician: Janneth    Admit Date: 10/5/2022     No diagnosis found.  Patient Active Problem List   Diagnosis   • Acid reflux   • Ankle arthralgia   • Cardiac conduction disorder   • Closed fracture of distal fibula   • Arteriosclerosis of coronary artery   • Fracture of distal end of tibia   • Elevated cholesterol   • BP (high blood pressure)   • Hypertrophic polyarthritis   • OP (osteoporosis)   • Right heart failure (HCC)   • Heart failure, chronic, right-sided (HCC)   • SOB (shortness of breath)   • Palpitations   • CHF (congestive heart failure) (HCC)   • Congestive heart failure (HCC)   • Precordial pain   • Chronic anticoagulation   • Hypokalemia   • Anemia, unspecified type   • Generalized weakness     Past Medical History:   Diagnosis Date   • Anemia    • Arthritis    • CHF (congestive heart failure) ([facility])    • Chronic kidney disease    • Coronary artery disease    • H/O blood clots    • History of transfusion     no reaction    • Hypertension      Past Surgical History:   Procedure Laterality Date   • ABDOMINAL SURGERY     • APPENDECTOMY     • CARDIAC SURGERY     •  SECTION     • COLONOSCOPY N/A 3/7/2018    Procedure: COLONOSCOPY;  Surgeon: Willie Gresham MD;  Location:  [facility];  Service:    • CORONARY ANGIOPLASTY WITH STENT PLACEMENT     • ENDOSCOPY N/A 3/6/2018    Procedure: ESOPHAGOGASTRODUODENOSCOPY;  Surgeon: Willie Gresham MD;  Location:  [facility];  Service:    • PACEMAKER IMPLANTATION      left side    • SKIN BIOPSY     • TUBAL ABDOMINAL LIGATION           OT ASSESSMENT FLOWSHEET (last 12 hours)     OT Evaluation and Treatment     Row Name 23 1646                   OT Time and Intention    Document Type therapy note (daily note)  -KR        Mode of  Treatment occupational therapy  -KR        Patient Effort good  -KR           General Information    General Observations of Patient alert/cooperative  -KR           Motor Skills    Therapeutic Exercise shoulder  -KR           Shoulder (Therapeutic Exercise)    Shoulder (Therapeutic Exercise) wand exercises;AROM (active range of motion)  -KR        Shoulder AROM (Therapeutic Exercise) bilateral;flexion;extension;aBduction;aDduction;sitting;10 repetitions;3 sets  -KR           Elbow/Forearm (Therapeutic Exercise)    Elbow/Forearm AROM (Therapeutic Exercise) bilateral;flexion;extension;10 repetitions;3 sets  -KR           Wrist (Therapeutic Exercise)    Wrist AROM (Therapeutic Exercise) bilateral;flexion;extension  -KR        Wrist Strengthening (Therapeutic Exercise) --  wrist rolls from seated x 2  -KR           Therapy Assessment/Plan (OT)    Comment, Therapy Assessment/Plan (OT) Pt continues to improve with general functional endurance. Tolerating activity adequately at this time. OT to continue as tolerated.  -KR              User Key  (r) = Recorded By, (t) = Taken By, (c) = Cosigned By    Initials Name Effective Dates    KR Andres Uriarte, OT 06/16/21 -                        OT Recommendation and Plan     Progress Toward Functional Goals (OT): progress toward functional goals is gradual  Plan of Care Review  Plan of Care Reviewed With: patient  Progress: improving  Plan of Care Reviewed With: patient     Outcome Measures     Row Name 01/02/23 1649 12/31/22 1103 12/30/22 1819       How much help from another is currently needed...    Putting on and taking off regular lower body clothing? 3  -KR 3  -KP 3  -KR    Bathing (including washing, rinsing, and drying) 3  -KR 3  -KP 3  -KR    Toileting (which includes using toilet bed pan or urinal) 3  -KR 3  -KP 3  -KR    Putting on and taking off regular upper body clothing 3  -KR 3  -KP 3  -KR    Taking care of personal grooming (such as brushing teeth) 3  -KR 3  -KP 3   -KR    Eating meals 4  -KR 4  -KP 4  -KR    AM-PAC 6 Clicks Score (OT) 19  -KR 19  -KP 19  -KR          User Key  (r) = Recorded By, (t) = Taken By, (c) = Cosigned By    Initials Name Provider Type    Andres Ayala, OT Occupational Therapist    Linn Richardson OT Occupational Therapist                Time Calculation:    Time Calculation- OT     Row Name 01/02/23 1649             Time Calculation- OT    Total Timed Code Minutes- OT 30 minute(s)  -KR            User Key  (r) = Recorded By, (t) = Taken By, (c) = Cosigned By    Initials Name Provider Type    Andres Ayala OT Occupational Therapist              Therapy Charges for Today     Code Description Service Date Service Provider Modifiers Qty    [id number]  OT THERAPEUTIC ACT EA 15 MIN 1/2/2023 Andres Uriarte OT GO 2               Andres Uriarte OT  1/2/2023

## 2023-01-02 NOTE — PLAN OF CARE
Goal Outcome Evaluation:           Progress: no change  Outcome Evaluation: pt has rested in bed. Pt has sat in chair most of the day. Pt worked with Pt/Ot. No other changes to note at this time; will continue to monitor

## 2023-01-02 NOTE — PLAN OF CARE
Goal Outcome Evaluation:              Outcome Evaluation: Patient has rested in bed, PRN suppository given for constipation, 1 small bowel movement this shift, no complaints or request at this time, VSS, Will continue plan of care.

## 2023-01-02 NOTE — PROGRESS NOTES
[facility] HOSPITALIST PROGRESS NOTE    Subjective     History:   Tre Kinney is a 89 y.o. female admitted on 10/5/2022 secondary to Generalized weakness     Procedures: None    CC: Follow up debility    Patient seen and examined with SONAM Garcia. Awake and alert. Sitting in bedside chair during my encounter. Appears to be resting comfortably. No further episodes of CP reported. No reported dyspnea or palpitations. No reported nausea or vomiting.  No acute events overnight per RN.     History taken from: patient, chart, and RN.      Objective     Vital Signs  Temp:  [97.8 °F (36.6 °C)-97.9 °F (36.6 °C)] 97.8 °F (36.6 °C)  Heart Rate:  [75] 75  Resp:  [17-18] 18  BP: (126-153)/(66-77) 153/77    Intake/Output Summary (Last 24 hours) at 1/2/2023 1700  Last data filed at 1/2/2023 0534  Gross per 24 hour   Intake 360 ml   Output --   Net 360 ml         Physical Exam: Unchanged from previous.   General:    Awake, alert, in no acute distress, chronically ill appearing   Heart:      Normal S1 and S2. Regular rate and rhythm. No significant murmur, rubs or gallops appreciated.   Lungs:     Respirations regular, even and unlabored. Lungs clear to auscultation B/L. No wheezes, rales or rhonchi.   Abdomen:   Soft and nontender. No guarding, rebound tenderness or  organomegaly noted. Bowel sounds present x 4.   Extremities:  Trace bilateral pedal edema. Moves UE and LE equally B/L with generalized weakness.     Results Review:                        Results from last 7 days   Lab Units 12/31/22 2041 12/31/22  1755   TROPONIN T ng/mL <0.010 0.011       Imaging Results (Last 24 Hours)     ** No results found for the last 24 hours. **            Medications:  apixaban, 5 mg, Oral, Q12H  Diclofenac Sodium, 4 g, Topical, 4x Daily  docusate sodium, 100 mg, Oral, BID  escitalopram, 10 mg, Oral, Daily  furosemide, 40 mg, Oral, BID  gabapentin, 300 mg, Oral, Nightly  isosorbide mononitrate, 30 mg, Oral,  Q24H  magnesium oxide, 400 mg, Oral, Daily  melatonin, 10 mg, Oral, Nightly  metoprolol tartrate, 25 mg, Oral, Q12H  multivitamin, 1 tablet, Oral, Daily  pantoprazole, 40 mg, Oral, Q AM  sodium chloride, 10 mL, Intravenous, Q12H  sodium chloride, 10 mL, Intravenous, Q12H  tamsulosin, 0.4 mg, Oral, Daily               Assessment & Plan   Generalized weakness/debility  Diffuse LLE DVT  DIANA on CKD IV  Acute blood loss anemia  Traumatic right gluteal hematoma  Mild hypokalemia   Episode of CP  Paroxysmal Afib  Hx of SSS s/p pacemaker placement  Essential HTN  Reported hx of DVT's   Anxiety/depression  Advanced age    Plan  BP stable and controlled today on current regimen. Tolerating Eliquis with no reported signs of bleeding. No further episodes of CP reported. RN reports improvement in symptoms with Tums and bowel regimen. Cont Imdur. Cont PT/OT as tolerated.     Disposition: Pt has been approved for additional swing bed days through 1/4/23 with clinical updates due on 1/3.            Mustapha Loredo,   01/02/23  17:00 EST

## 2023-01-03 LAB
ALBUMIN SERPL-MCNC: 3.2 G/DL (ref 3.5–5.2)
ALBUMIN/GLOB SERPL: 1.1 G/DL
ALP SERPL-CCNC: 53 U/L (ref 39–117)
ALT SERPL W P-5'-P-CCNC: 5 U/L (ref 1–33)
ANION GAP SERPL CALCULATED.3IONS-SCNC: 14 MMOL/L (ref 5–15)
AST SERPL-CCNC: 8 U/L (ref 1–32)
BASOPHILS # BLD AUTO: 0.06 10*3/MM3 (ref 0–0.2)
BASOPHILS NFR BLD AUTO: 0.9 % (ref 0–1.5)
BILIRUB SERPL-MCNC: 0.3 MG/DL (ref 0–1.2)
BUN SERPL-MCNC: 32 MG/DL (ref 8–23)
BUN/CREAT SERPL: 14.5 (ref 7–25)
CALCIUM SPEC-SCNC: 9.1 MG/DL (ref 8.2–9.6)
CHLORIDE SERPL-SCNC: 100 MMOL/L (ref 98–107)
CO2 SERPL-SCNC: 24 MMOL/L (ref 22–29)
CREAT SERPL-MCNC: 2.21 MG/DL (ref 0.57–1)
DEPRECATED RDW RBC AUTO: 47.8 FL (ref 37–54)
EGFRCR SERPLBLD CKD-EPI 2021: 20.7 ML/MIN/1.73
EOSINOPHIL # BLD AUTO: 0.37 10*3/MM3 (ref 0–0.4)
EOSINOPHIL NFR BLD AUTO: 5.8 % (ref 0.3–6.2)
ERYTHROCYTE [DISTWIDTH] IN BLOOD BY AUTOMATED COUNT: 14.3 % (ref 12.3–15.4)
GLOBULIN UR ELPH-MCNC: 3 GM/DL
GLUCOSE SERPL-MCNC: 110 MG/DL (ref 65–99)
HCT VFR BLD AUTO: 33.2 % (ref 34–46.6)
HGB BLD-MCNC: 10.4 G/DL (ref 12–15.9)
IMM GRANULOCYTES # BLD AUTO: 0.01 10*3/MM3 (ref 0–0.05)
IMM GRANULOCYTES NFR BLD AUTO: 0.2 % (ref 0–0.5)
LYMPHOCYTES # BLD AUTO: 1.28 10*3/MM3 (ref 0.7–3.1)
LYMPHOCYTES NFR BLD AUTO: 20.1 % (ref 19.6–45.3)
MCH RBC QN AUTO: 28.8 PG (ref 26.6–33)
MCHC RBC AUTO-ENTMCNC: 31.3 G/DL (ref 31.5–35.7)
MCV RBC AUTO: 92 FL (ref 79–97)
MONOCYTES # BLD AUTO: 0.63 10*3/MM3 (ref 0.1–0.9)
MONOCYTES NFR BLD AUTO: 9.9 % (ref 5–12)
NEUTROPHILS NFR BLD AUTO: 4.02 10*3/MM3 (ref 1.7–7)
NEUTROPHILS NFR BLD AUTO: 63.1 % (ref 42.7–76)
NRBC BLD AUTO-RTO: 0 /100 WBC (ref 0–0.2)
PLATELET # BLD AUTO: 235 10*3/MM3 (ref 140–450)
PMV BLD AUTO: 10.1 FL (ref 6–12)
POTASSIUM SERPL-SCNC: 3.9 MMOL/L (ref 3.5–5.2)
PROT SERPL-MCNC: 6.2 G/DL (ref 6–8.5)
RBC # BLD AUTO: 3.61 10*6/MM3 (ref 3.77–5.28)
SODIUM SERPL-SCNC: 138 MMOL/L (ref 136–145)
WBC NRBC COR # BLD: 6.37 10*3/MM3 (ref 3.4–10.8)

## 2023-01-03 PROCEDURE — 85025 COMPLETE CBC W/AUTO DIFF WBC: CPT | Performed by: INTERNAL MEDICINE

## 2023-01-03 PROCEDURE — 97116 GAIT TRAINING THERAPY: CPT

## 2023-01-03 PROCEDURE — 97530 THERAPEUTIC ACTIVITIES: CPT

## 2023-01-03 PROCEDURE — 97110 THERAPEUTIC EXERCISES: CPT

## 2023-01-03 PROCEDURE — 80053 COMPREHEN METABOLIC PANEL: CPT | Performed by: INTERNAL MEDICINE

## 2023-01-03 RX ADMIN — FUROSEMIDE 40 MG: 40 TABLET ORAL at 08:05

## 2023-01-03 RX ADMIN — DICLOFENAC 4 G: 10 GEL TOPICAL at 16:57

## 2023-01-03 RX ADMIN — DICLOFENAC 4 G: 10 GEL TOPICAL at 08:05

## 2023-01-03 RX ADMIN — TAMSULOSIN HYDROCHLORIDE 0.4 MG: 0.4 CAPSULE ORAL at 08:05

## 2023-01-03 RX ADMIN — METOPROLOL TARTRATE 25 MG: 25 TABLET, FILM COATED ORAL at 20:16

## 2023-01-03 RX ADMIN — ESCITALOPRAM 10 MG: 10 TABLET, FILM COATED ORAL at 08:05

## 2023-01-03 RX ADMIN — FUROSEMIDE 40 MG: 40 TABLET ORAL at 16:56

## 2023-01-03 RX ADMIN — GABAPENTIN 300 MG: 300 CAPSULE ORAL at 20:16

## 2023-01-03 RX ADMIN — DOCUSATE SODIUM 100 MG: 100 CAPSULE ORAL at 08:05

## 2023-01-03 RX ADMIN — DOCUSATE SODIUM 100 MG: 100 CAPSULE ORAL at 20:16

## 2023-01-03 RX ADMIN — Medication 1 TABLET: at 08:05

## 2023-01-03 RX ADMIN — METOPROLOL TARTRATE 25 MG: 25 TABLET, FILM COATED ORAL at 08:05

## 2023-01-03 RX ADMIN — APIXABAN 5 MG: 5 TABLET, FILM COATED ORAL at 08:05

## 2023-01-03 RX ADMIN — PANTOPRAZOLE SODIUM 40 MG: 40 TABLET, DELAYED RELEASE ORAL at 05:07

## 2023-01-03 RX ADMIN — MAGNESIUM GLUCONATE 500 MG ORAL TABLET 400 MG: 500 TABLET ORAL at 08:05

## 2023-01-03 RX ADMIN — ISOSORBIDE MONONITRATE 30 MG: 30 TABLET, EXTENDED RELEASE ORAL at 08:05

## 2023-01-03 RX ADMIN — Medication 10 MG: at 20:16

## 2023-01-03 RX ADMIN — DICLOFENAC 4 G: 10 GEL TOPICAL at 20:16

## 2023-01-03 RX ADMIN — DICLOFENAC 4 G: 10 GEL TOPICAL at 12:14

## 2023-01-03 RX ADMIN — APIXABAN 5 MG: 5 TABLET, FILM COATED ORAL at 20:16

## 2023-01-03 NOTE — PAYOR COMM NOTE
"Michelet Jacques RN  Swing Bed Nurse  [phone] Ex [phone] FAX: [phone]  [email or web address]  [facility]  NPI [id number]  Reference Number [id number]    Clinical updates for  PT/OT      Tre Kinney (89 y.o. Female)    YOB: 1932  Social Security Number:   Address: [address] [place] [address]  Home Phone: [phone]  MRN: [id number]  Shinto: Ep[employer]op[employer]  Marital Status:         Admission Date: 10/5/22  Admission Type: Urgent  Admitting Provider: Jannet Lagunas MD  Attending Provider: Mustapha Loredo DO  Department, Room/Bed: [place] [id number]/  Discharge Date:   Discharge Disposition:   Discharge Destination:               Attending Provider: Mustapha Loredo DO    Allergies: Adhesive Tape, Xanax [Alprazolam]    Isolation: None   Infection: None   Code Status: No CPR    Ht: 170.2 cm (67.01\")   Wt: 79.5 kg (175 lb 3.2 oz)    Admission Cmt: None   Principal Problem: Generalized weakness [R53.1]                 Active Insurance as of 10/5/2022     Primary Coverage     Payor Plan Insurance Group Employer/Plan Group    ANTH MEDICARE REPLACEMENT ANTH MEDICARE ADVANTAGE [id number]     Payor Plan Address Payor Plan Phone Number Payor Plan Fax Number Effective Dates    [address] [phone]  1/1/2022 - None Entered    [facility] [id number]       Subscriber Name Subscriber Birth Date Member ID       TRE KINNEY 12/9/1932 [id number]                 Emergency Contacts      (Rel.) Home Phone Work Phone Mobile Phone    Valdez Kinney (Son) [phone] None None    Leno Kinney (Son) [phone] None [phone]    Willian Kinney (Son) None None [phone]              Current Facility-Administered Medications   Medication Dose Route Frequency Provider Last Rate Last Admin   • acetaminophen (TYLENOL) tablet 650 mg  650 mg Oral Q6H PRN Mustapha Loredo DO   650 mg at 12/27/22 " 2029   • apixaban (ELIQUIS) tablet 5 mg  5 mg Oral Q12H Mustapha Loredo DO   5 mg at 01/02/23 2007   • benzocaine-menthol (DERMOPLAST) 20-0.5 % topical spray   Topical 4x Daily PRN Kennedi Saucedo MD   Given at 11/03/22 0548   • calcium carbonate (TUMS) chewable tablet 500 mg (200 mg elemental)  2 tablet Oral TID PRN Benito Gil DO   2 tablet at 12/31/22 1908   • Diclofenac Sodium (VOLTAREN) 1 % gel 4 g  4 g Topical 4x Daily Jannet Lagunas MD   4 g at 01/02/23 2007   • docusate sodium (COLACE) capsule 100 mg  100 mg Oral BID Jannet Lagunas MD   100 mg at 01/02/23 2007   • escitalopram (LEXAPRO) tablet 10 mg  10 mg Oral Daily Jannet Lagunas MD   10 mg at 01/02/23 0832   • furosemide (LASIX) tablet 40 mg  40 mg Oral BID Jannet Lagunas MD   40 mg at 01/02/23 1707   • gabapentin (NEURONTIN) capsule 300 mg  300 mg Oral Nightly Jannet Lagunas MD   300 mg at 01/02/23 2008   • Glycerin (INDRA/PED) suppository 1 g  1 g Rectal Q12H PRN Jannet Lagunas MD   1 g at 01/01/23 2016   • guaiFENesin-dextromethorphan (ROBITUSSIN DM) 100-10 MG/5ML syrup 5 mL  5 mL Oral Q4H PRN Kennedi Saucedo MD       • hydrALAZINE (APRESOLINE) injection 10 mg  10 mg Intravenous Q6H PRN Mustapha Loredo DO       • HYDROcodone-acetaminophen (NORCO) 7.5-325 MG per tablet 1 tablet  1 tablet Oral Q8H PRN Jannet Lagunas MD   1 tablet at 12/31/22 0859   • isosorbide mononitrate (IMDUR) 24 hr tablet 30 mg  30 mg Oral Q24H Benito Gil DO   30 mg at 01/02/23 0832   • magnesium oxide (MAG-OX) tablet 400 mg  400 mg Oral Daily Jannet Lagunas MD   400 mg at 01/02/23 0832   • Magnesium Sulfate 2 gram Bolus, followed by 8 gram infusion (total Mg dose 10 grams)- Mg less than or equal to 1mg/dL  2 g Intravenous PRN Jannet Lagunas MD        Or   • Magnesium Sulfate 2 gram / 50mL Infusion (GIVE X 3 BAGS TO EQUAL 6GM TOTAL DOSE) - Mg 1.1 - 1.5 mg/dl  2 g Intravenous PRN Jannet Lagunas MD        Or    • Magnesium Sulfate 4 gram infusion- Mg 1.6-1.9 mg/dL  4 g Intravenous PRN Jannet Lagunas MD       • melatonin tablet 10 mg  10 mg Oral Nightly Jannet Lagunas MD   10 mg at 01/02/23 2007   • metoprolol tartrate (LOPRESSOR) tablet 25 mg  25 mg Oral Q12H Dante Levine MD   25 mg at 01/02/23 2007   • multivitamin (THERAGRAN) tablet 1 tablet  1 tablet Oral Daily Jannet Lagunas MD   1 tablet at 01/02/23 0832   • ondansetron (ZOFRAN) injection 4 mg  4 mg Intravenous Q6H PRN Jannet Lagunas MD       • pantoprazole (PROTONIX) EC tablet 40 mg  40 mg Oral Q AM Jannet Lagunas MD   40 mg at 01/03/23 0507   • phenol (CHLORASEPTIC) 1.4 % liquid 1 spray  1 spray Mouth/Throat Q2H PRN Jannet Lagunas MD   1 spray at 10/13/22 2056   • polyethylene glycol (MIRALAX) packet 17 g  17 g Oral BID PRN Jannet Lagunas MD   17 g at 12/14/22 0053   • potassium chloride (K-DUR,KLOR-CON) CR tablet 40 mEq  40 mEq Oral PRN Jannet Lagunas MD        Or   • potassium chloride (KLOR-CON) packet 40 mEq  40 mEq Oral PRN Jannet Lagunas MD        Or   • potassium chloride 10 mEq in 100 mL IVPB  10 mEq Intravenous Q1H PRN Jannet Lagunas MD       • prochlorperazine (COMPAZINE) injection 5 mg  5 mg Intravenous Q6H PRN Jannet Lagunas MD        Or   • prochlorperazine (COMPAZINE) tablet 5 mg  5 mg Oral Q6H PRN Jannet Lagunas MD   5 mg at 12/31/22 1818    Or   • prochlorperazine (COMPAZINE) suppository 25 mg  25 mg Rectal Q12H PRN Jannet Lagunas MD       • promethazine (PHENERGAN) 6.25 mg in sodium chloride 0.9 % 50 mL  6.25 mg Intravenous Q6H PRN Jannet Lagunas MD       • sodium chloride 0.9 % flush 10 mL  10 mL Intravenous PRN Jannet Lagunas MD       • sodium chloride 0.9 % flush 10 mL  10 mL Intravenous Q12H Jannet Lagunas MD   10 mL at 01/02/23 0832   • sodium chloride 0.9 % flush 10 mL  10 mL Intravenous PRN Jannet Lagunas MD       • sodium chloride 0.9 % flush 10 mL  10 mL Intravenous  Q12H Jannet Lagunas MD   10 mL at 01/02/23 0833   • sodium chloride 0.9 % flush 10 mL  10 mL Intravenous PRN Jannet Lagunas MD       • tamsulosin (FLOMAX) 24 hr capsule 0.4 mg  0.4 mg Oral Daily Jannet Lagunas MD   0.4 mg at 01/02/23 0832        Physician Progress Notes (last 48 hours)      Mustapha Loredo DO at 01/02/23 1700                [facility]    Subjective     History:   Tre Kinney is a 89 y.o. female admitted on 10/5/2022 secondary to Generalized weakness     Procedures: None    CC: Follow up debility    Patient seen and examined with SONAM Garcia. Awake and alert. Sitting in bedside chair during my encounter. Appears to be resting comfortably. No further episodes of CP reported. No reported dyspnea or palpitations. No reported nausea or vomiting.  No acute events overnight per RN.     History taken from: patient, chart, and RN.      Objective     Vital Signs  Temp:  [97.8 °F (36.6 °C)-97.9 °F (36.6 °C)] 97.8 °F (36.6 °C)  Heart Rate:  [75] 75  Resp:  [17-18] 18  BP: (126-153)/(66-77) 153/77    Intake/Output Summary (Last 24 hours) at 1/2/2023 1700  Last data filed at 1/2/2023 0534  Gross per 24 hour   Intake 360 ml   Output --   Net 360 ml         Physical Exam: Unchanged from previous.   General:    Awake, alert, in no acute distress, chronically ill appearing   Heart:      Normal S1 and S2. Regular rate and rhythm. No significant murmur, rubs or gallops appreciated.   Lungs:     Respirations regular, even and unlabored. Lungs clear to auscultation B/L. No wheezes, rales or rhonchi.   Abdomen:   Soft and nontender. No guarding, rebound tenderness or  organomegaly noted. Bowel sounds present x 4.   Extremities:  Trace bilateral pedal edema. Moves UE and LE equally B/L with generalized weakness.     Results Review:                        Results from last 7 days   Lab Units 12/31/22  2041 12/31/22  1755   TROPONIN T ng/mL <0.010 0.011       Imaging  Results (Last 24 Hours)     ** No results found for the last 24 hours. **            Medications:  apixaban, 5 mg, Oral, Q12H  Diclofenac Sodium, 4 g, Topical, 4x Daily  docusate sodium, 100 mg, Oral, BID  escitalopram, 10 mg, Oral, Daily  furosemide, 40 mg, Oral, BID  gabapentin, 300 mg, Oral, Nightly  isosorbide mononitrate, 30 mg, Oral, Q24H  magnesium oxide, 400 mg, Oral, Daily  melatonin, 10 mg, Oral, Nightly  metoprolol tartrate, 25 mg, Oral, Q12H  multivitamin, 1 tablet, Oral, Daily  pantoprazole, 40 mg, Oral, Q AM  sodium chloride, 10 mL, Intravenous, Q12H  sodium chloride, 10 mL, Intravenous, Q12H  tamsulosin, 0.4 mg, Oral, Daily               Assessment & Plan   Generalized weakness/debility  Diffuse LLE DVT  DIANA on CKD IV  Acute blood loss anemia  Traumatic right gluteal hematoma  Mild hypokalemia   Episode of CP  Paroxysmal Afib  Hx of SSS s/p pacemaker placement  Essential HTN  Reported hx of DVT's   Anxiety/depression  Advanced age    Plan  BP stable and controlled today on current regimen. Tolerating Eliquis with no reported signs of bleeding. No further episodes of CP reported. RN reports improvement in symptoms with Tums and bowel regimen. Cont Imdur. Cont PT/OT as tolerated.     Disposition: Pt has been approved for additional swing bed days through 1/4/23 with clinical updates due on 1/3.            Mustapha Loredo DO  01/02/23  17:00 EST    Electronically signed by Mustapha Loredo DO at 01/02/23 1704     Benito Gil DO at 01/01/23 1612        Patient currently in swing bed for additional therapy in the setting of generalized weakness and debility.  Patient not seen and examined today.  However patient was seen and examined the evening prior due to complaints of chest pain.  EKG and troponin were all negative and patient's chest pain did resolve on its own later in the evening after previously getting sublingual nitro and Tums at time of the event.  Of note patient previously  on Imdur 60 mg and previously held and have resumed on 30 mg and patient tolerating well.  Nursing staff report no issues or recurrence of chest pain since yesterday evening's event.  Patient initially admitted to swing bed on 10/5 and approved for additional swing bed days through 1/4/2023.  We will continue with PT and OT.  Continues to progress well with physical therapy.  Patient to be continued on Eliquis for treatment of DVT.      Electronically signed by Benito Gil DO at 01/01/23 1614          Consult Notes (most recent note)      Andrew Chapman MD at 11/03/22 1249      Consult Orders    1. Inpatient Psychiatrist Consult [[id number]] ordered by Kennedi Saucedo MD at 11/03/22 0744    2. Inpatient Psychiatrist Consult [[id number]] ordered by Kennedi Saucedo MD at 10/31/22 1147                   Referring Provider: Lewis  Reason for Consultation: Capacity      Chief complaint/Focus of Exam: Capacity Evaluation     Subjective .     History of present illness: Patient is an 89-year-old female with a history significant for CHF, CKD, CAD, history of clots, chronic anticoagulation, sick sinus syndrome with pacer placement, chronic diastolic CHF, anemia, and depression who originally admitted to the hospital after a fall.  Psychiatry was originally consulted early in her hospitalization 1 month ago for depressive symptoms and for medication management in light of hyponatremia.  I evaluated patient at that time.  Psychiatry was reconsulted to evaluate patient for medical decision-making capacity as there have been some concerns about treatment care planning going forward and family has been involved.  I evaluated patient at bedside today while she was eating lunch.  She was sitting up and reported that she was having some neck pain but denied any other complaints.  She was alert and oriented x4.  She is able to answer all my questions appropriately.  She was not experiencing any confusion,  hallucinations, delusions, or paranoia.  She denied SI/HI/AVH.  She was making jokes about being in the hospital and seemed to be in good spirits despite her neck pain.    Review of Systems  Pertinent items are noted in HPI    History  Past Medical History:   Diagnosis Date   • Anemia    • Arthritis    • CHF (congestive heart failure) (HCC)    • Chronic kidney disease    • Coronary artery disease    • H/O blood clots    • History of transfusion     no reaction    • Hypertension    ,   Past Surgical History:   Procedure Laterality Date   • ABDOMINAL SURGERY     • APPENDECTOMY     • CARDIAC SURGERY     •  SECTION     • COLONOSCOPY N/A 3/7/2018    Procedure: COLONOSCOPY;  Surgeon: Willie Gresham MD;  Location:  [facility];  Service:    • CORONARY ANGIOPLASTY WITH STENT PLACEMENT     • ENDOSCOPY N/A 3/6/2018    Procedure: ESOPHAGOGASTRODUODENOSCOPY;  Surgeon: Willie Gresham MD;  Location:  [facility];  Service:    • PACEMAKER IMPLANTATION      left side    • SKIN BIOPSY     • TUBAL ABDOMINAL LIGATION     ,   Family History   Problem Relation Age of Onset   • Other Mother         cardiac disorder   • Hypertension Mother    • Heart disease Mother    • Thyroid disease Mother    • Other Father         cardiac disorder   • Heart disease Father    • Hypertension Father    • Other Sister         cardiac disorder   • Other Brother         cardiac disorder   • Other Other         cardiac disorder   • Other Other         cardiac disorder   ,   Social History     Socioeconomic History   • Marital status:    Tobacco Use   • Smoking status: Never   Substance and Sexual Activity   • Alcohol use: No   • Drug use: No   • Sexual activity: Defer     E-cigarette/Vaping     E-cigarette/Vaping Substances     E-cigarette/Vaping Devices       ,   Medications Prior to Admission   Medication Sig Dispense Refill Last Dose   • ALPRAZolam (XANAX) 1 MG tablet Take 1 mg by mouth 2 (Two) Times a Day As Needed for Anxiety.    Unknown at Unknown time   • aspirin 81 MG EC tablet Take 81 mg by mouth Daily.   Unknown at Unknown time   • escitalopram (LEXAPRO) 10 MG tablet Take 10 mg by mouth Daily.   Unknown at Unknown time   • furosemide (LASIX) 40 MG tablet Take 40 mg by mouth Daily.   Unknown at Unknown time   • gabapentin (NEURONTIN) 400 MG capsule Take 400 mg by mouth Every Night.   Unknown at Unknown time   • hydrALAZINE (APRESOLINE) 100 MG tablet Take 1 tablet by mouth Every 8 (Eight) Hours. 90 tablet 0 Unknown at Unknown time   • HYDROcodone-acetaminophen (NORCO) 7.5-325 MG per tablet Take 1 tablet by mouth Every 8 (Eight) Hours As Needed for Moderate Pain.   Unknown at Unknown time   • isosorbide mononitrate (IMDUR) 60 MG 24 hr tablet Take 60 mg by mouth Daily.   Unknown at Unknown time   • metoprolol tartrate (LOPRESSOR) 50 MG tablet Take 50 mg by mouth 2 (Two) Times a Day.   Unknown at Unknown time   • pantoprazole (PROTONIX) 40 MG EC tablet Take 40 mg by mouth Daily.   Unknown at Unknown time   • potassium chloride (MICRO-K) 10 MEQ CR capsule Take 10 mEq by mouth 2 (Two) Times a Day.   Unknown at Unknown time   • warfarin (COUMADIN) 6 MG tablet Take 6 mg by mouth Daily.   Unknown at Unknown time   , Scheduled Meds:  apixaban, 5 mg, Oral, Q12H  escitalopram, 10 mg, Oral, Daily  furosemide, 40 mg, Oral, BID  gabapentin, 300 mg, Oral, Nightly  hydrALAZINE, 5 mg, Oral, Q8H  melatonin, 10 mg, Oral, Nightly  metoprolol tartrate, 12.5 mg, Oral, Q12H  multivitamin, 1 tablet, Oral, Daily  pantoprazole, 40 mg, Oral, Q AM  sodium chloride, 10 mL, Intravenous, Q12H  sodium chloride, 10 mL, Intravenous, Q12H  tamsulosin, 0.4 mg, Oral, Daily    , Continuous Infusions:  Pharmacy Consult,     , PRN Meds:  •  benzocaine-menthol  •  docusate sodium  •  HYDROcodone-acetaminophen  •  magnesium sulfate **OR** magnesium sulfate **OR** magnesium sulfate  •  ondansetron  •  Pharmacy Consult  •  phenol  •  polyethylene glycol  •  potassium chloride  **OR** potassium chloride **OR** potassium chloride  •  prochlorperazine **OR** prochlorperazine **OR** prochlorperazine  •  promethazine  •  sodium chloride  •  sodium chloride  •  sodium chloride and Allergies:  Adhesive tape and Xanax [alprazolam]    Objective     Vital Signs   Temp:  [98.2 °F (36.8 °C)-98.4 °F (36.9 °C)] 98.2 °F (36.8 °C)  Heart Rate:  [71-77] 75  Resp:  [16-18] 16  BP: (129-161)/() 161/116    Mental Status Exam:   Mental Status Exam:    Hygiene:   good  Cooperation:  Cooperative  Eye Contact:  Good  Psychomotor Behavior:  Slow  Affect:  Full range  Hopelessness: Optimistic  Speech:  Normal  Thought Progress:  Goal directed and Linear  Thought Content:  Normal and Mood congruent  Suicidal:  None  Homicidal:  None  Hallucinations:  None  Delusion:  None  Memory:  Intact  Orientation:  Person, Place, Time and Situation  Reliability:  good  Insight:  Fair  Judgement:  Fair  Impulse Control:  Fair    Results Review:   I reviewed the patient's new clinical results.  Lab Results (last 24 hours)     ** No results found for the last 24 hours. **        Imaging Results (Last 24 Hours)     ** No results found for the last 24 hours. **            Assessment & Plan     Evaluation for medical decision making capacity  -At this point in time, patient does have capacity for medical decision making.  She is alert and oriented and able to answer questions appropriately and I do not have any concerns about her making her own treatment plan and decisions going forward.    I discussed the patients findings and my recommendations with patient    Andrew Chapman MD  11/03/22  12:49 EDT        Electronically signed by Andrew Chapman MD at 11/03/22 1253          Nutrition Notes (most recent note)      Magi Oneal RD at 12/27/22 1026        Nutrition Services    Patient Name:  Tre Kinney  YOB: 1932  MRN: [id number]  Admit Date:  10/5/2022    BMI: 26.62  Diet: Regular  Intake: 65% x 12  meals  Fluid: not meeting needs     Electronically signed by:  Mgai Oneal RD  22 10:26 EST     Electronically signed by Magi Oneal RD at 22 1026          Physical Therapy Notes (most recent note)      Bhargavi Vásquez, PT at 23 1255  Version 1 of 1         Acute Care - Physical Therapy Treatment Note  [facility]     Patient Name: Tre Kinney  : 1932  MRN: [id number]  Today's Date: 2023   Onset of Illness/Injury or Date of Surgery: 10/06/22  Visit Dx:   No diagnosis found.  Patient Active Problem List   Diagnosis   • Acid reflux   • Ankle arthralgia   • Cardiac conduction disorder   • Closed fracture of distal fibula   • Arteriosclerosis of coronary artery   • Fracture of distal end of tibia   • Elevated cholesterol   • BP (high blood pressure)   • Hypertrophic polyarthritis   • OP (osteoporosis)   • Right heart failure (HCC)   • Heart failure, chronic, right-sided (HCC)   • SOB (shortness of breath)   • Palpitations   • CHF (congestive heart failure) (HCC)   • Congestive heart failure (HCC)   • Precordial pain   • Chronic anticoagulation   • Hypokalemia   • Anemia, unspecified type   • Generalized weakness     Past Medical History:   Diagnosis Date   • Anemia    • Arthritis    • CHF (congestive heart failure) (HCC)    • Chronic kidney disease    • Coronary artery disease    • H/O blood clots    • History of transfusion     no reaction    • Hypertension      Past Surgical History:   Procedure Laterality Date   • ABDOMINAL SURGERY     • APPENDECTOMY     • CARDIAC SURGERY     •  SECTION     • COLONOSCOPY N/A 3/7/2018    Procedure: COLONOSCOPY;  Surgeon: Willie Gresham MD;  Location:  [facility];  Service:    • CORONARY ANGIOPLASTY WITH STENT PLACEMENT     • ENDOSCOPY N/A 3/6/2018    Procedure: ESOPHAGOGASTRODUODENOSCOPY;  Surgeon: Willie Gresham MD;  Location:  [facility];  Service:    • PACEMAKER IMPLANTATION      left side    • SKIN BIOPSY     • TUBAL  ABDOMINAL LIGATION       PT Assessment (last 12 hours)     PT Evaluation and Treatment     Row Name 01/02/23 1352          Physical Therapy Time and Intention    Subjective Information complains of;weakness;fatigue  -CT     Document Type therapy note (daily note)  -CT     Mode of Treatment physical therapy;individual therapy  -CT     Patient Effort good  -CT     Comment Pt reports feeling better this date and worked with therapy well. Pt treatment session [id number]. Pt ambulated x 2 and performed seated and standing ther ex.  -CT     Row Name 01/02/23 1352          General Information    Patient Profile Reviewed yes  -CT     Row Name 01/02/23 1352          Cognition    Affect/Mental Status (Cognition) WFL  -CT     Orientation Status (Cognition) oriented x 4  -CT     Follows Commands (Cognition) WFL  -CT     Row Name 01/02/23 1352          Bed Mobility    Comment, (Bed Mobility) pt up in chair  -CT     Row Name 01/02/23 1352          Transfers    Transfers sit-stand transfer;stand-sit transfer  -CT     Row Name 01/02/23 1352          Sit-Stand Transfer    Sit-Stand [place] (Transfers) minimum assist (75% patient effort);verbal cues  -CT     Assistive Device (Sit-Stand Transfers) walker, front-wheeled  -CT     Row Name 01/02/23 1352          Stand-Sit Transfer    Stand-Sit [place] (Transfers) minimum assist (75% patient effort)  -CT     Assistive Device (Stand-Sit Transfers) walker, front-wheeled  -CT     Row Name 01/02/23 1352          Toilet Transfer    Type (Toilet Transfer) sit-stand;stand-sit;stand pivot/stand step  -CT     [place] Level (Toilet Transfer) minimum assist (75% patient effort)  -CT     Row Name 01/02/23 1352          Gait/Stairs (Locomotion)    [place] Level (Gait) minimum assist (75% patient effort);1 person assist;verbal cues;nonverbal cues (demo/gesture)  -CT     Assistive Device (Gait) walker, front-wheeled  -CT     Distance in Feet (Gait) 14, 16  -CT     Pattern (Gait)  step-to  -CT     Deviations/Abnormal Patterns (Gait) stride length decreased;gait speed decreased;festinating/shuffling;weight shifting decreased  -CT     Bilateral Gait Deviations weight shift ability decreased;forward flexed posture  -CT     Row Name 01/02/23 1352          Motor Skills    Therapeutic Exercise --  seated ther ex: seated marches, LAQ, AP, hip abd/add 25 reps; standing ther ex: standing marches, heel raises 20 reps  -CT     Row Name 01/02/23 1352          Coping    Observed Emotional State calm;cooperative;pleasant  -CT     Verbalized Emotional State acceptance  -CT     Row Name 01/02/23 1352          Plan of Care Review    Plan of Care Reviewed With patient  -CT     Row Name 01/02/23 1352          Positioning and Restraints    Pre-Treatment Position sitting in chair/recliner  -CT     Post Treatment Position chair  -CT     In Chair sitting;call light within reach;encouraged to call for assist;notified nsg  -CT           User Key  (r) = Recorded By, (t) = Taken By, (c) = Cosigned By    Initials Name Provider Type    CT Bhargavi Vásquez PT Physical Therapist                Physical Therapy Education     Title: PT OT SLP Therapies (Done)     Topic: Physical Therapy (Done)     Point: Mobility training (Done)     Learning Progress Summary           Patient Acceptance, TB,E, VU,DU by BD at 1/2/2023 0907    Acceptance, TB,E, VU,DU by BD at 1/1/2023 0846    Acceptance, E,TB, VU by RM at 12/30/2022 1035    Acceptance, E,TB, VU by AC at 12/28/2022 1505    Acceptance, E,TB, VU by AC at 12/27/2022 1513    Acceptance, E, VU by CL at 12/26/2022 1325    Acceptance, E, VU by CL at 12/22/2022 1826    Acceptance, E, VU by CL at 12/21/2022 1303    Acceptance, E, VU by EB at 12/20/2022 1644    Acceptance, E, VU by CL at 12/17/2022 1703    Acceptance, E,D, VU,NR by LL at 12/16/2022 1605    Acceptance, E,TB, VU by AC at 12/16/2022 1523    Acceptance, E,TB, VU by AC at 12/15/2022 1618    Acceptance, E,TB, VU by AC at  12/14/2022 1503    Acceptance, E,TB, VU by DL at 12/13/2022 0240    Acceptance, D,E, VU,NR by LL at 12/12/2022 1455    Acceptance, E,TB, VU by RW at 12/9/2022 1451    Acceptance, E,TB, VU by BC at 11/27/2022 1319    Acceptance, E,TB, VU by RM at 11/20/2022 1449    Acceptance, E,TB, VU by RM at 11/18/2022 1021    Acceptance, E,TB, VU by RH at 11/16/2022 1110    Acceptance, E,TB, VU by KK at 11/14/2022 1638    Acceptance, E,TB, VU by KK at 11/13/2022 1648    Acceptance, E,TB, VU by KG at 11/10/2022 1827    Acceptance, E,TB, VU by DM at 11/9/2022 1638    Acceptance, E,TB, VU by KK at 11/8/2022 1754    Acceptance, E,TB, VU by KK at 11/7/2022 1635    Acceptance, E,D, VU,NR by LL at 11/5/2022 1325    Acceptance, E, VU by EB at 11/4/2022 1448    Acceptance, E,TB, VU by KM at 11/4/2022 1101   Family Acceptance, D,E, VU,NR by LL at 12/12/2022 1455                   Point: Home exercise program (Done)     Learning Progress Summary           Patient Acceptance, TB,E, VU,DU by BD at 1/2/2023 0907    Acceptance, TB,E, VU,DU by BD at 1/1/2023 0846    Acceptance, E,TB, VU by RM at 12/30/2022 1035    Acceptance, E,TB, VU by AC at 12/28/2022 1505    Acceptance, E,TB, VU by AC at 12/27/2022 1513    Acceptance, E, VU by CL at 12/26/2022 1325    Acceptance, E, VU by CL at 12/22/2022 1826    Acceptance, E, VU by CL at 12/21/2022 1303    Acceptance, E, VU by EB at 12/20/2022 1644    Acceptance, E, VU by CL at 12/17/2022 1703    Acceptance, E,D, VU,NR by LL at 12/16/2022 1605    Acceptance, E,TB, VU by AC at 12/16/2022 1523    Acceptance, E,TB, VU by AC at 12/15/2022 1618    Acceptance, E,TB, VU by AC at 12/14/2022 1503    Acceptance, E,TB, VU by DL at 12/13/2022 0240    Acceptance, D,E, VU,NR by LL at 12/12/2022 1455    Acceptance, E,TB, VU by RW at 12/9/2022 1451    Acceptance, E,TB, VU by BC at 11/27/2022 1319    Acceptance, E,TB, VU by RM at 11/20/2022 1449    Acceptance, E,TB, VU by RM at 11/18/2022 1021    Acceptance, E,TB, VU by RH  at 11/16/2022 1110    Acceptance, E,TB, VU by KK at 11/14/2022 1638    Acceptance, E,TB, VU by KK at 11/13/2022 1648    Acceptance, E,TB, VU by KG at 11/10/2022 1827    Acceptance, E,TB, VU by DM at 11/9/2022 1638    Acceptance, E,TB, VU by KK at 11/8/2022 1754    Acceptance, E,TB, VU by KK at 11/7/2022 1635    Acceptance, E,D, VU,NR by LL at 11/5/2022 1325    Acceptance, E, VU by EB at 11/4/2022 1448    Acceptance, E,TB, VU by KM at 11/4/2022 1101   Family Acceptance, D,E, VU,NR by LL at 12/12/2022 1455                   Point: Body mechanics (Done)     Learning Progress Summary           Patient Acceptance, TB,E, VU,DU by BD at 1/2/2023 0907    Acceptance, TB,E, VU,DU by BD at 1/1/2023 0846    Acceptance, E,TB, VU by RM at 12/30/2022 1035    Acceptance, E,TB, VU by AC at 12/28/2022 1505    Acceptance, E,TB, VU by AC at 12/27/2022 1513    Acceptance, E, VU by CL at 12/26/2022 1325    Acceptance, E, VU by CL at 12/22/2022 1826    Acceptance, E, VU by CL at 12/21/2022 1303    Acceptance, E, VU by EB at 12/20/2022 1644    Acceptance, E, VU by CL at 12/17/2022 1703    Acceptance, E,D, VU,NR by LL at 12/16/2022 1605    Acceptance, E,TB, VU by AC at 12/16/2022 1523    Acceptance, E,TB, VU by AC at 12/15/2022 1618    Acceptance, E,TB, VU by AC at 12/14/2022 1503    Acceptance, E,TB, VU by DL at 12/13/2022 0240    Acceptance, D,E, VU,NR by LL at 12/12/2022 1455    Acceptance, E,TB, VU by RW at 12/9/2022 1451    Acceptance, E,TB, VU by BC at 11/27/2022 1319    Acceptance, E,TB, VU by RM at 11/20/2022 1449    Acceptance, E,TB, VU by RM at 11/18/2022 1021    Acceptance, E,TB, VU by RH at 11/16/2022 1110    Acceptance, E,TB, VU by KK at 11/14/2022 1638    Acceptance, E,TB, VU by KK at 11/13/2022 1648    Acceptance, E,TB, VU by KG at 11/10/2022 1827    Acceptance, E,TB, VU by DM at 11/9/2022 1638    Acceptance, E,TB, VU by KK at 11/8/2022 1754    Acceptance, E,TB, VU by KK at 11/7/2022 1635    Acceptance, E,D, VU,NR by LL at  11/5/2022 1325    Acceptance, E, VU by EB at 11/4/2022 1448    Acceptance, E,TB, VU by KM at 11/4/2022 1101   Family Acceptance, D,E, VU,NR by LL at 12/12/2022 1455                   Point: Precautions (Done)     Learning Progress Summary           Patient Acceptance, TB,E, VU,DU by BD at 1/2/2023 0907    Acceptance, TB,E, VU,DU by BD at 1/1/2023 0846    Acceptance, E,TB, VU by RM at 12/30/2022 1035    Acceptance, E,TB, VU by AC at 12/28/2022 1505    Acceptance, E,TB, VU by AC at 12/27/2022 1513    Acceptance, E, VU by CL at 12/26/2022 1325    Acceptance, E, VU by CL at 12/22/2022 1826    Acceptance, E, VU by CL at 12/21/2022 1303    Acceptance, E, VU by EB at 12/20/2022 1644    Acceptance, E, VU by CL at 12/17/2022 1703    Acceptance, E,D, VU,NR by LL at 12/16/2022 1605    Acceptance, E,TB, VU by AC at 12/16/2022 1523    Acceptance, E,TB, VU by AC at 12/15/2022 1618    Acceptance, E,TB, VU by AC at 12/14/2022 1503    Acceptance, E,TB, VU by DL at 12/13/2022 0240    Acceptance, D,E, VU,NR by LL at 12/12/2022 1455    Acceptance, E,TB, VU by RW at 12/9/2022 1451    Acceptance, E,TB, VU by BC at 11/27/2022 1319    Acceptance, E,TB, VU by RM at 11/20/2022 1449    Acceptance, E,TB, VU by RM at 11/18/2022 1021    Acceptance, E,TB, VU by RH at 11/16/2022 1110    Acceptance, E,TB, VU by KK at 11/14/2022 1638    Acceptance, E,TB, VU by KK at 11/13/2022 1648    Acceptance, E,TB, VU by KG at 11/10/2022 1827    Acceptance, E,TB, VU by DM at 11/9/2022 1638    Acceptance, E,TB, VU by KK at 11/8/2022 1754    Acceptance, E,TB, VU by KK at 11/7/2022 1635    Acceptance, E,D, VU,NR by LL at 11/5/2022 1325    Acceptance, E, VU by EB at 11/4/2022 1448    Acceptance, E,TB, VU by KM at 11/4/2022 1101   Family Acceptance, D,E, VU,NR by LL at 12/12/2022 1455                               User Key     Initials Effective Dates Name Provider Type Discipline    ISRAEL 06/16/21 -  Mitzy Mustafa, RN Registered Nurse Nurse    LL 05/02/16 -   Neda Garcia, PTA Physical Therapist Assistant PT    RM 06/14/22 -  Bethany Krause, RN Registered Nurse Nurse    DM 06/16/21 -  Moniz, Deborah, RN Registered Nurse Nurse    BC 08/02/22 -  Collett, Brittany, RN Registered Nurse Nurse    AC 10/13/22 -  Jaya Castillo, RN Registered Nurse Nurse    BD 08/05/21 -  Radha Lynn, RN Registered Nurse Nurse    KM 05/24/22 -  Mao Vázquez, PT Physical Therapist PT    EB 09/22/22 -  Cedric Oliver, RN Registered Nurse Nurse    KK 06/27/22 -  Chelsi Bolivar, RN Registered Nurse Nurse    DL 03/16/22 -  Francy Garcia, RN Registered Nurse Nurse    KG 09/22/22 -  Verónica Rodriguez, RN Registered Nurse Nurse    RW 09/22/22 -  Bethany Schofield, RN Registered Nurse Nurse    RH 08/03/22 - 12/21/22 Jung Russell, SONAM Extern Registered Nurse Nurse              PT Recommendation and Plan  Anticipated Discharge Disposition (PT): home with 24/7 care, skilled nursing [facility]  Planned Therapy Interventions (PT): balance training, bed mobility training, gait training, home exercise program, manual therapy techniques, motor coordination training, neuromuscular re-education, patient/family education, postural re-education, strengthening, transfer training  Therapy Frequency (PT): 5 times/wk (5-6 times/wk)  Progress Summary (PT)  Progress Toward Functional Goals (PT): progress toward functional goals is gradual  Plan of Care Reviewed With: patient  Progress: improving   Outcome Measures     Row Name 12/31/22 1103 12/30/22 0970          How much help from another is currently needed...    Putting on and taking off regular lower body clothing? 3  -KP 3  -KR     Bathing (including washing, rinsing, and drying) 3  -KP 3  -KR     Toileting (which includes using toilet bed pan or urinal) 3  -KP 3  -KR     Putting on and taking off regular upper body clothing 3  -KP 3  -KR     Taking care of personal grooming (such as brushing teeth) 3  -KP 3  -KR     Eating meals 4  -KP 4  -KR     AM-PAC 6  Clicks Score (OT) 19  -KP 19  -KR           User Key  (r) = Recorded By, (t) = Taken By, (c) = Cosigned By    Initials Name Provider Type    Andres Ayala OT Occupational Therapist    Linn Richardson OT Occupational Therapist                 Time Calculation:    PT Charges     Row Name 23 1406             Time Calculation    Start Time 1255  -CT      Stop Time 1335  -CT      Time Calculation (min) 40 min  -CT      PT Received On 23  -CT            User Key  (r) = Recorded By, (t) = Taken By, (c) = Cosigned By    Initials Name Provider Type    CT Bhargavi Vásquez, PT Physical Therapist              Therapy Charges for Today     Code Description Service Date Service Provider Modifiers Qty    69208043866 HC GAIT TRAINING EA 15 MIN 2023 Bhargavi Vásquez, PT GP 1    40246441931 HC PT THERAPEUTIC ACT EA 15 MIN 2023 Bhargavi Vásquez, PT GP 1    04648947022 HC PT THER PROC EA 15 MIN 2023 Bhargavi Vásquez, PT GP 1          PT G-Codes  Outcome Measure Options: AM-PAC 6 Clicks Daily Activity (OT)  AM-PAC 6 Clicks Score (PT): 14  AM-PAC 6 Clicks Score (OT): 19    Bhargavi Vásquez PT  2023      Electronically signed by Bhargavi Vásquez PT at 23 1417          Occupational Therapy Notes (most recent note)      Andres Uriarte, OT at 23 1649          Acute Care - Occupational Therapy Treatment Note   [place]     Patient Name: Tre Kinney  : 1932  MRN: [id number]  Today's Date: 2023  Onset of Illness/Injury or Date of Surgery: 10/06/22     Referring Physician: Janneth    Admit Date: 10/5/2022     No diagnosis found.  Patient Active Problem List   Diagnosis   • Acid reflux   • Ankle arthralgia   • Cardiac conduction disorder   • Closed fracture of distal fibula   • Arteriosclerosis of coronary artery   • Fracture of distal end of tibia   • Elevated cholesterol   • BP (high blood pressure)   • Hypertrophic polyarthritis   • OP (osteoporosis)   • Right heart failure (HCC)   • Heart  failure, chronic, right-sided (HCC)   • SOB (shortness of breath)   • Palpitations   • CHF (congestive heart failure) (HCC)   • Congestive heart failure (HCC)   • Precordial pain   • Chronic anticoagulation   • Hypokalemia   • Anemia, unspecified type   • Generalized weakness     Past Medical History:   Diagnosis Date   • Anemia    • Arthritis    • CHF (congestive heart failure) (HCC)    • Chronic kidney disease    • Coronary artery disease    • H/O blood clots    • History of transfusion     no reaction    • Hypertension      Past Surgical History:   Procedure Laterality Date   • ABDOMINAL SURGERY     • APPENDECTOMY     • CARDIAC SURGERY     •  SECTION     • COLONOSCOPY N/A 3/7/2018    Procedure: COLONOSCOPY;  Surgeon: Willie Gresham MD;  Location:  [facility];  Service:    • CORONARY ANGIOPLASTY WITH STENT PLACEMENT     • ENDOSCOPY N/A 3/6/2018    Procedure: ESOPHAGOGASTRODUODENOSCOPY;  Surgeon: Willie Gresham MD;  Location:  [facility];  Service:    • PACEMAKER IMPLANTATION      left side    • SKIN BIOPSY     • TUBAL ABDOMINAL LIGATION           OT ASSESSMENT FLOWSHEET (last 12 hours)     OT Evaluation and Treatment     Row Name 23 1646                   OT Time and Intention    Document Type therapy note (daily note)  -KR        Mode of Treatment occupational therapy  -KR        Patient Effort good  -KR           General Information    General Observations of Patient alert/cooperative  -KR           Motor Skills    Therapeutic Exercise shoulder  -KR           Shoulder (Therapeutic Exercise)    Shoulder (Therapeutic Exercise) wand exercises;AROM (active range of motion)  -KR        Shoulder AROM (Therapeutic Exercise) bilateral;flexion;extension;aBduction;aDduction;sitting;10 repetitions;3 sets  -KR           Elbow/Forearm (Therapeutic Exercise)    Elbow/Forearm AROM (Therapeutic Exercise) bilateral;flexion;extension;10 repetitions;3 sets  -KR           Wrist (Therapeutic Exercise)     Wrist AROM (Therapeutic Exercise) bilateral;flexion;extension  -KR        Wrist Strengthening (Therapeutic Exercise) --  wrist rolls from seated x 2  -KR           Therapy Assessment/Plan (OT)    Comment, Therapy Assessment/Plan (OT) Pt continues to improve with general functional endurance. Tolerating activity adequately at this time. OT to continue as tolerated.  -KR              User Key  (r) = Recorded By, (t) = Taken By, (c) = Cosigned By    Initials Name Effective Dates    Andres Ayala, OT 06/16/21 -                        OT Recommendation and Plan     Progress Toward Functional Goals (OT): progress toward functional goals is gradual  Plan of Care Review  Plan of Care Reviewed With: patient  Progress: improving  Plan of Care Reviewed With: patient     Outcome Measures     Row Name 01/02/23 1649 12/31/22 1103 12/30/22 1819       How much help from another is currently needed...    Putting on and taking off regular lower body clothing? 3  -KR 3  -KP 3  -KR    Bathing (including washing, rinsing, and drying) 3  -KR 3  -KP 3  -KR    Toileting (which includes using toilet bed pan or urinal) 3  -KR 3  -KP 3  -KR    Putting on and taking off regular upper body clothing 3  -KR 3  -KP 3  -KR    Taking care of personal grooming (such as brushing teeth) 3  -KR 3  -KP 3  -KR    Eating meals 4  -KR 4  -KP 4  -KR    AM-PAC 6 Clicks Score (OT) 19  -KR 19  -KP 19  -KR          User Key  (r) = Recorded By, (t) = Taken By, (c) = Cosigned By    Initials Name Provider Type    Andres Ayala OT Occupational Therapist    Linn Richardson OT Occupational Therapist                Time Calculation:    Time Calculation- OT     Row Name 01/02/23 1649             Time Calculation- OT    Total Timed Code Minutes- OT 30 minute(s)  -KR            User Key  (r) = Recorded By, (t) = Taken By, (c) = Cosigned By    Initials Name Provider Type    Andres Ayala, VELASQUEZ Occupational Therapist              Therapy Charges for Today      Code Description Service Date Service Provider Modifiers Qty    50503469823  OT THERAPEUTIC ACT EA 15 MIN 1/2/2023 Andres Uriarte OT GO 2               Andres Uriarte OT  1/2/2023    Electronically signed by Andres Uriarte OT at 01/02/23 1650     Discharge Planning Assessment  CODY Ellison     Patient Name: Tre Kinney                    MRN: [id number]  Today's Date: 12/30/2022                   Admit Date: 10/5/2022     Plan: Pt was admitted to swing bed on 10/5/22. Pt's insurance has been approved on 12/28/22 additional swing bed days through 1/4/2023 with clinical updates due 1/3/23. SS to continue to follow and assist as needed.            Discharge Plan

## 2023-01-03 NOTE — THERAPY TREATMENT NOTE
Acute Care - Physical Therapy Treatment Note   [place]     Patient Name: Tre Kinney  : 1932  MRN: [id number]  Today's Date: 1/3/2023   Onset of Illness/Injury or Date of Surgery: 10/06/22  Visit Dx:   No diagnosis found.  Patient Active Problem List   Diagnosis   • Acid reflux   • Ankle arthralgia   • Cardiac conduction disorder   • Closed fracture of distal fibula   • Arteriosclerosis of coronary artery   • Fracture of distal end of tibia   • Elevated cholesterol   • BP (high blood pressure)   • Hypertrophic polyarthritis   • OP (osteoporosis)   • Right heart failure (HCC)   • Heart failure, chronic, right-sided (HCC)   • SOB (shortness of breath)   • Palpitations   • CHF (congestive heart failure) (HCC)   • Congestive heart failure (HCC)   • Precordial pain   • Chronic anticoagulation   • Hypokalemia   • Anemia, unspecified type   • Generalized weakness     Past Medical History:   Diagnosis Date   • Anemia    • Arthritis    • CHF (congestive heart failure) (HCC)    • Chronic kidney disease    • Coronary artery disease    • H/O blood clots    • History of transfusion     no reaction    • Hypertension      Past Surgical History:   Procedure Laterality Date   • ABDOMINAL SURGERY     • APPENDECTOMY     • CARDIAC SURGERY     •  SECTION     • COLONOSCOPY N/A 3/7/2018    Procedure: COLONOSCOPY;  Surgeon: Willie Gresham MD;  Location:  [facility];  Service:    • CORONARY ANGIOPLASTY WITH STENT PLACEMENT     • ENDOSCOPY N/A 3/6/2018    Procedure: ESOPHAGOGASTRODUODENOSCOPY;  Surgeon: Willie Gresham MD;  Location:  [facility];  Service:    • PACEMAKER IMPLANTATION      left side    • SKIN BIOPSY     • TUBAL ABDOMINAL LIGATION       PT Assessment (last 12 hours)     PT Evaluation and Treatment     Row Name 23 1411          Physical Therapy Time and Intention    Subjective Information no complaints  -CT     Document Type therapy note (daily note)  -CT     Mode of Treatment physical  therapy;individual therapy  -CT     Patient Effort good  -CT     Comment Pt treatment session [id number]. No answer from padmaja Dorsey over facetime. Pt ambulated x 2 and performed seated and standing ther ex.  -CT     Row Name 01/03/23 1411          General Information    Patient Profile Reviewed yes  -CT     Row Name 01/03/23 1411          Cognition    Affect/Mental Status (Cognition) WFL  -CT     Orientation Status (Cognition) oriented x 4  -CT     Follows Commands (Cognition) WFL  -CT     Row Name 01/03/23 1411          Transfers    Transfers sit-stand transfer;stand-sit transfer  -CT     Row Name 01/03/23 1411          Sit-Stand Transfer    Sit-Stand Lutz (Transfers) minimum assist (75% patient effort);verbal cues  -CT     Assistive Device (Sit-Stand Transfers) walker, front-wheeled  -CT     Row Name 01/03/23 1411          Stand-Sit Transfer    Stand-Sit Lutz (Transfers) minimum assist (75% patient effort)  -CT     Assistive Device (Stand-Sit Transfers) walker, front-wheeled  -CT     Row Name 01/03/23 1411          Toilet Transfer    Type (Toilet Transfer) sit-stand;stand-sit;stand pivot/stand step  -CT     Lutz Level (Toilet Transfer) minimum assist (75% patient effort)  -CT     Row Name 01/03/23 1411          Gait/Stairs (Locomotion)    Lutz Level (Gait) minimum assist (75% patient effort);1 person assist;verbal cues;nonverbal cues (demo/gesture)  -CT     Assistive Device (Gait) walker, front-wheeled  -CT     Distance in Feet (Gait) 18 x 2  -CT     Pattern (Gait) step-to  -CT     Deviations/Abnormal Patterns (Gait) stride length decreased;gait speed decreased;festinating/shuffling;weight shifting decreased  -CT     Bilateral Gait Deviations weight shift ability decreased;forward flexed posture  -CT     Row Name 01/03/23 1411          Motor Skills    Therapeutic Exercise --  BLE seated ther ex: seated marches, LAQ, AP, hip abd/add, glut sets 25 reps; BLE standing ther ex: standing  shaggy, mini squats 15 reps  -CT     Row Name 01/03/23 1411          Coping    Observed Emotional State calm;cooperative;pleasant  -CT     Verbalized Emotional State acceptance  -CT     Row Name 01/03/23 1411          Plan of Care Review    Plan of Care Reviewed With patient  -CT     Row Name 01/03/23 1411          Positioning and Restraints    Pre-Treatment Position sitting in chair/recliner  -CT     Post Treatment Position chair  -CT     In Chair sitting;call light within reach;encouraged to call for assist  -CT           User Key  (r) = Recorded By, (t) = Taken By, (c) = Cosigned By    Initials Name Provider Type    CT Bhargavi Vásquez PT Physical Therapist                Physical Therapy Education     Title: PT OT SLP Therapies (Done)     Topic: Physical Therapy (Done)     Point: Mobility training (Done)     Learning Progress Summary           Patient Acceptance, E,TB, DU,VU by BD at 1/3/2023 0838    Acceptance, TB,E, VU,DU by BD at 1/2/2023 0907    Acceptance, TB,E, VU,DU by BD at 1/1/2023 0846    Acceptance, E,TB, VU by RM at 12/30/2022 1035    Acceptance, E,TB, VU by AC at 12/28/2022 1505    Acceptance, E,TB, VU by AC at 12/27/2022 1513    Acceptance, E, VU by CL at 12/26/2022 1325    Acceptance, E, VU by CL at 12/22/2022 1826    Acceptance, E, VU by CL at 12/21/2022 1303    Acceptance, E, VU by EB at 12/20/2022 1644    Acceptance, E, VU by CL at 12/17/2022 1703    Acceptance, E,D, VU,NR by LL at 12/16/2022 1605    Acceptance, E,TB, VU by AC at 12/16/2022 1523    Acceptance, E,TB, VU by AC at 12/15/2022 1618    Acceptance, E,TB, VU by AC at 12/14/2022 1503    Acceptance, E,TB, VU by DL at 12/13/2022 0240    Acceptance, D,E, VU,NR by LL at 12/12/2022 1455    Acceptance, E,TB, VU by RW at 12/9/2022 1451    Acceptance, E,TB, VU by BC at 11/27/2022 1319    Acceptance, E,TB, VU by RM at 11/20/2022 1449    Acceptance, E,TB, VU by RM at 11/18/2022 1021    Acceptance, E,TB, VU by  at 11/16/2022 1110    Acceptance,  E,TB, VU by KK at 11/14/2022 1638    Acceptance, E,TB, VU by KK at 11/13/2022 1648    Acceptance, E,TB, VU by KG at 11/10/2022 1827    Acceptance, E,TB, VU by DM at 11/9/2022 1638    Acceptance, E,TB, VU by KK at 11/8/2022 1754    Acceptance, E,TB, VU by KK at 11/7/2022 1635    Acceptance, E,D, VU,NR by LL at 11/5/2022 1325    Acceptance, E, VU by EB at 11/4/2022 1448    Acceptance, E,TB, VU by KM at 11/4/2022 1101   Family Acceptance, D,E, VU,NR by LL at 12/12/2022 1455                   Point: Home exercise program (Done)     Learning Progress Summary           Patient Acceptance, E,TB, DU,VU by BD at 1/3/2023 0838    Acceptance, TB,E, VU,DU by BD at 1/2/2023 0907    Acceptance, TB,E, VU,DU by BD at 1/1/2023 0846    Acceptance, E,TB, VU by RM at 12/30/2022 1035    Acceptance, E,TB, VU by AC at 12/28/2022 1505    Acceptance, E,TB, VU by AC at 12/27/2022 1513    Acceptance, E, VU by CL at 12/26/2022 1325    Acceptance, E, VU by CL at 12/22/2022 1826    Acceptance, E, VU by CL at 12/21/2022 1303    Acceptance, E, VU by EB at 12/20/2022 1644    Acceptance, E, VU by CL at 12/17/2022 1703    Acceptance, E,D, VU,NR by LL at 12/16/2022 1605    Acceptance, E,TB, VU by AC at 12/16/2022 1523    Acceptance, E,TB, VU by AC at 12/15/2022 1618    Acceptance, E,TB, VU by AC at 12/14/2022 1503    Acceptance, E,TB, VU by DL at 12/13/2022 0240    Acceptance, D,E, VU,NR by LL at 12/12/2022 1455    Acceptance, E,TB, VU by RW at 12/9/2022 1451    Acceptance, E,TB, VU by BC at 11/27/2022 1319    Acceptance, E,TB, VU by RM at 11/20/2022 1449    Acceptance, E,TB, VU by RM at 11/18/2022 1021    Acceptance, E,TB, VU by RH at 11/16/2022 1110    Acceptance, E,TB, VU by KK at 11/14/2022 1638    Acceptance, E,TB, VU by KK at 11/13/2022 1648    Acceptance, E,TB, VU by KG at 11/10/2022 1827    Acceptance, E,TB, VU by DM at 11/9/2022 1638    Acceptance, E,TB, VU by KK at 11/8/2022 1754    Acceptance, E,TB, VU by KK at 11/7/2022 1635    Acceptance,  E,D, VU,NR by LL at 11/5/2022 1325    Acceptance, E, VU by EB at 11/4/2022 1448    Acceptance, E,TB, VU by KM at 11/4/2022 1101   Family Acceptance, D,E, VU,NR by LL at 12/12/2022 1455                   Point: Body mechanics (Done)     Learning Progress Summary           Patient Acceptance, E,TB, DU,VU by BD at 1/3/2023 0838    Acceptance, TB,E, VU,DU by BD at 1/2/2023 0907    Acceptance, TB,E, VU,DU by BD at 1/1/2023 0846    Acceptance, E,TB, VU by RM at 12/30/2022 1035    Acceptance, E,TB, VU by AC at 12/28/2022 1505    Acceptance, E,TB, VU by AC at 12/27/2022 1513    Acceptance, E, VU by CL at 12/26/2022 1325    Acceptance, E, VU by CL at 12/22/2022 1826    Acceptance, E, VU by CL at 12/21/2022 1303    Acceptance, E, VU by EB at 12/20/2022 1644    Acceptance, E, VU by CL at 12/17/2022 1703    Acceptance, E,D, VU,NR by LL at 12/16/2022 1605    Acceptance, E,TB, VU by AC at 12/16/2022 1523    Acceptance, E,TB, VU by AC at 12/15/2022 1618    Acceptance, E,TB, VU by AC at 12/14/2022 1503    Acceptance, E,TB, VU by DL at 12/13/2022 0240    Acceptance, D,E, VU,NR by LL at 12/12/2022 1455    Acceptance, E,TB, VU by RW at 12/9/2022 1451    Acceptance, E,TB, VU by BC at 11/27/2022 1319    Acceptance, E,TB, VU by RM at 11/20/2022 1449    Acceptance, E,TB, VU by RM at 11/18/2022 1021    Acceptance, E,TB, VU by RH at 11/16/2022 1110    Acceptance, E,TB, VU by KK at 11/14/2022 1638    Acceptance, E,TB, VU by KK at 11/13/2022 1648    Acceptance, E,TB, VU by KG at 11/10/2022 1827    Acceptance, E,TB, VU by DM at 11/9/2022 1638    Acceptance, E,TB, VU by KK at 11/8/2022 1754    Acceptance, E,TB, VU by KK at 11/7/2022 1635    Acceptance, E,D, VU,NR by LL at 11/5/2022 1325    Acceptance, E, VU by EB at 11/4/2022 1448    Acceptance, E,TB, VU by KM at 11/4/2022 1101   Family Acceptance, D,E, VU,NR by LL at 12/12/2022 1455                   Point: Precautions (Done)     Learning Progress Summary           Patient Acceptance, E,TB,  DU,VU by BD at 1/3/2023 0838    Acceptance, TB,E, VU,DU by BD at 1/2/2023 0907    Acceptance, TB,E, VU,DU by BD at 1/1/2023 0846    Acceptance, E,TB, VU by RM at 12/30/2022 1035    Acceptance, E,TB, VU by AC at 12/28/2022 1505    Acceptance, E,TB, VU by AC at 12/27/2022 1513    Acceptance, E, VU by CL at 12/26/2022 1325    Acceptance, E, VU by CL at 12/22/2022 1826    Acceptance, E, VU by CL at 12/21/2022 1303    Acceptance, E, VU by EB at 12/20/2022 1644    Acceptance, E, VU by CL at 12/17/2022 1703    Acceptance, E,D, VU,NR by LL at 12/16/2022 1605    Acceptance, E,TB, VU by AC at 12/16/2022 1523    Acceptance, E,TB, VU by AC at 12/15/2022 1618    Acceptance, E,TB, VU by AC at 12/14/2022 1503    Acceptance, E,TB, VU by DL at 12/13/2022 0240    Acceptance, D,E, VU,NR by LL at 12/12/2022 1455    Acceptance, E,TB, VU by RW at 12/9/2022 1451    Acceptance, E,TB, VU by BC at 11/27/2022 1319    Acceptance, E,TB, VU by RM at 11/20/2022 1449    Acceptance, E,TB, VU by RM at 11/18/2022 1021    Acceptance, E,TB, VU by RH at 11/16/2022 1110    Acceptance, E,TB, VU by KK at 11/14/2022 1638    Acceptance, E,TB, VU by KK at 11/13/2022 1648    Acceptance, E,TB, VU by KG at 11/10/2022 1827    Acceptance, E,TB, VU by DM at 11/9/2022 1638    Acceptance, E,TB, VU by KK at 11/8/2022 1754    Acceptance, E,TB, VU by KK at 11/7/2022 1635    Acceptance, E,D, VU,NR by LL at 11/5/2022 1325    Acceptance, E, VU by EB at 11/4/2022 1448    Acceptance, E,TB, VU by KM at 11/4/2022 1101   Family Acceptance, D,E, VU,NR by LL at 12/12/2022 1455                               User Key     Initials Effective Dates Name Provider Type Discipline    CL 06/16/21 -  Mitzy Mustafa, RN Registered Nurse Nurse    LL 05/02/16 -  Neda Garcia PTA Physical Therapist Assistant PT    RM 06/14/22 -  Bethany Krause RN Registered Nurse Nurse    DM 06/16/21 -  Moniz, Deborah, RN Registered Nurse Nurse    BC 08/02/22 -  Collett, Brittany, SONAM Registered  Nurse Nurse    AC 10/13/22 -  Jaya Castillo, RN Registered Nurse Nurse    BD 08/05/21 -  Radha Lynn, RN Registered Nurse Nurse    KM 05/24/22 -  Mao Vázquez, PT Physical Therapist PT    EB 09/22/22 -  Cedric Oliver, RN Registered Nurse Nurse    KK 06/27/22 -  Chelsi Bolivar, RN Registered Nurse Nurse    DL 03/16/22 -  Francy Garcia, RN Registered Nurse Nurse    KG 09/22/22 -  Verónica Rodriguez RN Registered Nurse Nurse    RW 09/22/22 -  Bethany Schofield RN Registered Nurse Nurse    RH 08/03/22 - 12/21/22 Jung Russell, SONAM Extern Registered Nurse Nurse              PT Recommendation and Plan  Anticipated Discharge Disposition (PT): home with 24/7 [facility], [facility] nursing [facility]  Planned Therapy Interventions (PT): balance training, bed mobility training, gait training, home exercise program, manual therapy techniques, motor coordination training, neuromuscular re-education, patient/family education, postural re-education, strengthening, transfer training  Therapy Frequency (PT): 5 times/wk (5-6 times/wk)  Progress Summary (PT)  Progress Toward Functional Goals (PT): progress toward functional goals is gradual  Plan of Care Reviewed With: patient  Progress: improving   Outcome Measures     Row Name 01/02/23 4669             How much help from another is currently needed...    Putting on and taking off regular lower body clothing? 3  -KR      Bathing (including washing, rinsing, and drying) 3  -KR      Toileting (which includes using toilet bed pan or urinal) 3  -KR      Putting on and taking off regular upper body clothing 3  -KR      Taking care of personal grooming (such as brushing teeth) 3  -KR      Eating meals 4  -KR      AM-PAC 6 Clicks Score (OT) 19  -KR            User Key  (r) = Recorded By, (t) = Taken By, (c) = Cosigned By    Initials Name Provider Type    Andres Ayala OT Occupational Therapist                 Time Calculation:    PT Charges     Row Name 01/03/23 1416             Time  Calculation    Start Time 1251  -CT      Stop Time 1330  -CT      Time Calculation (min) 39 min  -CT      PT Received On 01/03/23  -CT            User Key  (r) = Recorded By, (t) = Taken By, (c) = Cosigned By    Initials Name Provider Type    CT Bhargavi Vásquez, PT Physical Therapist              Therapy Charges for Today     Code Description Service Date Service Provider Modifiers Qty    92004783185 HC GAIT TRAINING EA 15 MIN 1/2/2023 Bhargavi Vásquez, PT GP 1    75445461139 HC PT THERAPEUTIC ACT EA 15 MIN 1/2/2023 Bhargavi Vásquez, PT GP 1    29148782146 HC PT THER PROC EA 15 MIN 1/2/2023 Bhargavi Vásquez, PT GP 1    61462192729 HC GAIT TRAINING EA 15 MIN 1/3/2023 Bhargavi Vásquez, PT GP 1    35950112913 HC PT THERAPEUTIC ACT EA 15 MIN 1/3/2023 Bhargavi Vásquez, PT GP 1    65019634254 HC PT THER PROC EA 15 MIN 1/3/2023 Bhargavi Vásquez, PT GP 1          PT G-Codes  Outcome Measure Options: AM-PAC 6 Clicks Daily Activity (OT)  AM-PAC 6 Clicks Score (PT): 14  AM-PAC 6 Clicks Score (OT): 19    Bhargavi Vásquez PT  1/3/2023

## 2023-01-03 NOTE — PLAN OF CARE
Goal Outcome Evaluation:           Progress: no change  Outcome Evaluation: pt has been resting in bed. Pt sat in chair today. Pt worked with PT/OT. No other changes to note at this time; will continue to monitor.

## 2023-01-03 NOTE — PROGRESS NOTES
Pt admitted to swing bed on 10/5 for additional therapy in the setting of generalized weakness/debility. Pt not seen or examined today. I have reviewed her VS and AM labs. I have discussed her care with SONAM Garcia who does not report any acute events or issues that need to be addressed. BP stable today. Cont current antihypertensive regimen. Cont Eliquis for treatment of DVT. Cont PT/OT. Pt has been approved for additional swing bed days through 1/4. Cont supportive treatment.       Mustapha Loredo DO  01/03/23  15:52 EDT

## 2023-01-03 NOTE — PROGRESS NOTES
Nutrition Services    Patient Name:  Tre Kinney  YOB: 1932  MRN: [id number]  Admit Date:  10/5/2022    BMI: 27.43  Diet: Regular   Intake: 75% x 12 meals  Fluid: not meeting     Electronically signed by:  Jose Oneal RD  01/03/23 13:34 EST

## 2023-01-03 NOTE — THERAPY TREATMENT NOTE
Acute Care - Occupational Therapy Treatment Note   [place]     Patient Name: Tre Kinney  : 1932  MRN: [id number]  Today's Date: 1/3/2023  Onset of Illness/Injury or Date of Surgery: 10/06/22     Referring Physician: Janneth    Admit Date: 10/5/2022     No diagnosis found.  Patient Active Problem List   Diagnosis   • Acid reflux   • Ankle arthralgia   • Cardiac conduction disorder   • Closed fracture of distal fibula   • Arteriosclerosis of coronary artery   • Fracture of distal end of tibia   • Elevated cholesterol   • BP (high blood pressure)   • Hypertrophic polyarthritis   • OP (osteoporosis)   • Right heart failure (HCC)   • Heart failure, chronic, right-sided (HCC)   • SOB (shortness of breath)   • Palpitations   • CHF (congestive heart failure) (HCC)   • Congestive heart failure (HCC)   • Precordial pain   • Chronic anticoagulation   • Hypokalemia   • Anemia, unspecified type   • Generalized weakness     Past Medical History:   Diagnosis Date   • Anemia    • Arthritis    • CHF (congestive heart failure) ([facility])    • Chronic kidney disease    • Coronary artery disease    • H/O blood clots    • History of transfusion     no reaction    • Hypertension      Past Surgical History:   Procedure Laterality Date   • ABDOMINAL SURGERY     • APPENDECTOMY     • CARDIAC SURGERY     •  SECTION     • COLONOSCOPY N/A 3/7/2018    Procedure: COLONOSCOPY;  Surgeon: Willie Gresham MD;  Location:  [facility];  Service:    • CORONARY ANGIOPLASTY WITH STENT PLACEMENT     • ENDOSCOPY N/A 3/6/2018    Procedure: ESOPHAGOGASTRODUODENOSCOPY;  Surgeon: Willie Gresham MD;  Location:  [facility];  Service:    • PACEMAKER IMPLANTATION      left side    • SKIN BIOPSY     • TUBAL ABDOMINAL LIGATION           OT ASSESSMENT FLOWSHEET (last 12 hours)     OT Evaluation and Treatment     Row Name 23 1445                   OT Time and Intention    Subjective Information no complaints  -        Document Type  progress note/recertification  -        Mode of Treatment individual therapy;occupational therapy  -KP        Total Minutes, Occupational Therapy 27  -KP        Patient Effort good  -KP        Comment Patient in bed upon arrival and agreeable to therapy. She was seen for functional transfer to recliner and then BUE exercises. Environment modified to support overall quality of life/mood with windows opened and assisting patient find TV station. She declined ADLs stating she performed bed bath with nursing staff overnight.  -           General Information    Patient Profile Reviewed yes  -KP        Existing Precautions/Restrictions fall  -KP        Limitations/Impairments safety/cognitive  -        Equipment Issued to Patient gait belt  -           Pain Assessment    Pretreatment Pain Rating 0/10 - no pain  -KP        Posttreatment Pain Rating 0/10 - no pain  -KP           Cognition    Affect/Mental Status (Cognition) WFL  -        Orientation Status (Cognition) oriented x 4  -KP        Follows Commands (Cognition) WFL  -        Personal Safety Interventions fall prevention program maintained;gait belt;nonskid shoes/slippers when out of bed  -           Bathing Assessment/Intervention    [place] Level (Bathing) upper body;set up;lower body;minimum assist (75% patient effort)  -           Upper Body Dressing Assessment/Training    [place] Level (Upper Body Dressing) upper body dressing skills;set up;standby assist  -           Lower Body Dressing Assessment/Training    [place] Level (Lower Body Dressing) lower body dressing skills;maximum assist (25% patient effort)  -           Grooming Assessment/Training    [place] Level (Grooming) grooming skills;wash face, hands;set up  -           Self-Feeding Assessment/Training    [place] Level (Feeding) feeding skills;liquids to mouth;finger foods;set up  -           Toileting Assessment/Training    [place] Level  (Toileting) toileting skills;moderate assist (50% patient effort)  -KP           Bed Mobility    Supine-Sit [place] (Bed Mobility) standby assist;verbal cues;nonverbal cues (demo/gesture)  -KP           Bed-Chair Transfer    Bed-Chair [place] (Transfers) minimum assist (75% patient effort)  -KP        Assistive Device (Bed-Chair Transfers) walker, front-wheeled  -KP           Motor Skills    Functional Endurance fair plus  -KP        Therapeutic Exercise other (see comments)  BUE resistive exercise with erd theraband X20 reps X2 sets; (AROM shoulder flexion)  -KP           Positioning and Restraints    Pre-Treatment Position in bed  -KP        Post Treatment Position chair  -KP        In Chair sitting;call light within reach;encouraged to call for assist;legs elevated  -KP           Progress Summary (OT)    Progress Toward Functional Goals (OT) progress toward functional goals is fair  -KP           Bed Mobility Goal 1 (OT)    Activity/Assistive Device (Bed Mobility Goal 1, OT) bed mobility activities, all  -KP        [place] Level/Cues Needed (Bed Mobility Goal 1, OT) modified independence  -KP        Time Frame (Bed Mobility Goal 1, OT) by discharge  -KP        Progress/Outcomes (Bed Mobility Goal 1, OT) continuing progress toward goal  -KP           Transfer Goal 1 (OT)    Activity/Assistive Device (Transfer Goal 1, OT) bed-to-chair/chair-to-bed;commode, 3-in-1  -KP        [place] Level/Cues Needed (Transfer Goal 1, OT) contact guard required  -KP        Time Frame (Transfer Goal 1, OT) by discharge  -KP        Progress/Outcome (Transfer Goal 1, OT) continuing progress toward goal;progress slower than expected  -KP           Dressing Goal 1 (OT)    Activity/Device (Dressing Goal 1, OT) dressing skills, all  -KP        [place]/Cues Needed (Dressing Goal 1, OT) minimum assist (75% or more patient effort);moderate assist (50-74% patient effort)  -KP        Progress/Outcome (Dressing Goal 1,  OT) continuing progress toward goal;progress slower than expected  -KP           Strength Goal 1 (OT)    Strength Goal 1 (OT) Increase BUE strength to 4/5 to promote increased safety with transfers and independence with ADLs.  -KP        Time Frame (Strength Goal 1, OT) by discharge  -KP        Progress/Outcome (Strength Goal 1, OT) continuing progress toward goal;progress slower than expected  -KP            Activity Tolerance Goal 1 (OT)    Activity Tolerance Goal 1 (OT) Increase to enhance performance with self care/mobility  -KP        Activity Level (Endurance Goal 1, OT) 15 min activity  -KP        Time Frame (Activity Tolerance Goal 1, OT) by discharge  -KP        Progress/Outcome (Activity Tolerance Goal 1, OT) continuing progress toward goal;progress slower than expected  -KP           Patient Education Goal (OT)    Activity (Patient Education Goal, OT) AE/DME training to enhance self care safety/independence  -KP        [place]/Cues/Accuracy (Memory Goal 2, OT) verbalizes understanding  -KP        Time Frame (Patient Education Goal, OT) by discharge  -KP        Progress/Outcome (Patient Education Goal, OT) continuing progress toward goal  -KP              User Key  (r) = Recorded By, (t) = Taken By, (c) = Cosigned By    Initials Name Effective Dates    Linn Richardson, OT 06/16/21 -                        OT Recommendation and Plan     Progress Toward Functional Goals (OT): progress toward functional goals is fair  Plan of Care Review  Plan of Care Reviewed With: patient  Progress: improving  Outcome Evaluation: Patient seen for OT treatment session 8:55 to 9:44. Initial session addressed functional transfers/mobility with PT present. Then OT addressed UE strength, endurance, and ROM through seated exercises. She then was seen for ADL retraining as documented for toileting. Continue plan of care.  Plan of Care Reviewed With: patient  Outcome Evaluation: Patient seen for OT treatment session 8:55 to  9:44. Initial session addressed functional transfers/mobility with PT present. Then OT addressed UE strength, endurance, and ROM through seated exercises. She then was seen for ADL retraining as documented for toileting. Continue plan of care.     Outcome Measures     Row Name 01/03/23 1453 01/02/23 1649          How much help from another is currently needed...    Putting on and taking off regular lower body clothing? 3  -KP 3  -KR     Bathing (including washing, rinsing, and drying) 3  -KP 3  -KR     Toileting (which includes using toilet bed pan or urinal) 3  -KP 3  -KR     Putting on and taking off regular upper body clothing 3  -KP 3  -KR     Taking care of personal grooming (such as brushing teeth) 3  -KP 3  -KR     Eating meals 4  -KP 4  -KR     AM-PAC 6 Clicks Score (OT) 19  -KP 19  -KR           User Key  (r) = Recorded By, (t) = Taken By, (c) = Cosigned By    Initials Name Provider Type    Andres Ayala OT Occupational Therapist    Linn Richardson OT Occupational Therapist                Time Calculation:    Time Calculation- OT     Row Name 01/03/23 1454             Time Calculation- OT    OT Start Time 0904  -      OT Stop Time 0931  -      OT Time Calculation (min) 27 min  -      Total Timed Code Minutes- OT 27 minute(s)  -      OT Received On 01/03/23  -            User Key  (r) = Recorded By, (t) = Taken By, (c) = Cosigned By    Initials Name Provider Type    Linn Richardson OT Occupational Therapist              Therapy Charges for Today     Code Description Service Date Service Provider Modifiers Qty    75139439328 HC OT THER PROC EA 15 MIN 1/3/2023 Linn Quevedo OT GO 1    24468837177 HC OT THERAPEUTIC ACT EA 15 MIN 1/3/2023 Linn Quevedo OT GO 1               Linn Quevedo OT  1/3/2023

## 2023-01-04 PROCEDURE — 97110 THERAPEUTIC EXERCISES: CPT

## 2023-01-04 PROCEDURE — 97116 GAIT TRAINING THERAPY: CPT

## 2023-01-04 RX ADMIN — DOCUSATE SODIUM 100 MG: 100 CAPSULE ORAL at 08:48

## 2023-01-04 RX ADMIN — METOPROLOL TARTRATE 25 MG: 25 TABLET, FILM COATED ORAL at 08:48

## 2023-01-04 RX ADMIN — DICLOFENAC 4 G: 10 GEL TOPICAL at 11:19

## 2023-01-04 RX ADMIN — PANTOPRAZOLE SODIUM 40 MG: 40 TABLET, DELAYED RELEASE ORAL at 05:51

## 2023-01-04 RX ADMIN — ESCITALOPRAM 10 MG: 10 TABLET, FILM COATED ORAL at 08:48

## 2023-01-04 RX ADMIN — APIXABAN 5 MG: 5 TABLET, FILM COATED ORAL at 08:48

## 2023-01-04 RX ADMIN — MAGNESIUM GLUCONATE 500 MG ORAL TABLET 400 MG: 500 TABLET ORAL at 08:49

## 2023-01-04 RX ADMIN — ISOSORBIDE MONONITRATE 30 MG: 30 TABLET, EXTENDED RELEASE ORAL at 08:48

## 2023-01-04 RX ADMIN — TAMSULOSIN HYDROCHLORIDE 0.4 MG: 0.4 CAPSULE ORAL at 08:48

## 2023-01-04 RX ADMIN — DICLOFENAC 4 G: 10 GEL TOPICAL at 21:23

## 2023-01-04 RX ADMIN — FUROSEMIDE 40 MG: 40 TABLET ORAL at 17:14

## 2023-01-04 RX ADMIN — APIXABAN 5 MG: 5 TABLET, FILM COATED ORAL at 21:23

## 2023-01-04 RX ADMIN — METOPROLOL TARTRATE 25 MG: 25 TABLET, FILM COATED ORAL at 21:23

## 2023-01-04 RX ADMIN — Medication 10 MG: at 21:23

## 2023-01-04 RX ADMIN — DICLOFENAC 4 G: 10 GEL TOPICAL at 17:13

## 2023-01-04 RX ADMIN — DOCUSATE SODIUM 100 MG: 100 CAPSULE ORAL at 21:23

## 2023-01-04 RX ADMIN — FUROSEMIDE 40 MG: 40 TABLET ORAL at 08:48

## 2023-01-04 RX ADMIN — Medication 1 TABLET: at 08:48

## 2023-01-04 RX ADMIN — DICLOFENAC 4 G: 10 GEL TOPICAL at 08:49

## 2023-01-04 RX ADMIN — GABAPENTIN 300 MG: 300 CAPSULE ORAL at 21:23

## 2023-01-04 NOTE — CASE MANAGEMENT/SOCIAL WORK
Discharge Planning Assessment   [place]     Patient Name: Tre Kinney  MRN: [id number]  Today's Date: 1/4/2023    Admit Date: 10/5/2022    Plan: Pt was admitted to swing bed on 10/5/22. Pt's insurance was approved on 12/28/22 for additional swing bed days through 1/4/2023 with clinical updates due 1/3/23. Swing bed RN submitted updated clinicals to insurance on 1/3/22.  SS to continue to follow and assist as needed.     Discharge Plan     Row Name 01/04/23 1733       Plan    Plan Pt was admitted to swing bed on 10/5/22. Pt's insurance was approved on 12/28/22 for additional swing bed days through 1/4/2023 with clinical updates due 1/3/23. Swing bed RN submitted updated clinicals to insurance on 1/3/22.  SS to continue to follow and assist as needed.                TERESITA CraigW

## 2023-01-04 NOTE — PROGRESS NOTES
Pt admitted to swing bed on 10/5 for additional therapy in the setting of generalized weakness/debility. Pt not seen or examined today. I have reviewed her VS. I have discussed her care with SONAM Hatfield who does not report any acute events or issues that need to be addressed. BP stable today. Cont current antihypertensive regimen. Cont Eliquis for treatment of DVT. Cont PT/OT. Pt has been approved for additional swing bed days through today. Awaiting further input from SS/CM and swing bed coordinator. Cont supportive treatment.       Mustapha Loredo,   01/04/23  15:52 EDT

## 2023-01-04 NOTE — THERAPY TREATMENT NOTE
Acute Care - Physical Therapy Treatment Note   [place]     Patient Name: Tre Kinney  : 1932  MRN: [id number]  Today's Date: 2023   Onset of Illness/Injury or Date of Surgery: 10/06/22  Visit Dx:   No diagnosis found.  Patient Active Problem List   Diagnosis   • Acid reflux   • Ankle arthralgia   • Cardiac conduction disorder   • Closed fracture of distal fibula   • Arteriosclerosis of coronary artery   • Fracture of distal end of tibia   • Elevated cholesterol   • BP (high blood pressure)   • Hypertrophic polyarthritis   • OP (osteoporosis)   • Right heart failure (HCC)   • Heart failure, chronic, right-sided (HCC)   • SOB (shortness of breath)   • Palpitations   • CHF (congestive heart failure) (HCC)   • Congestive heart failure (HCC)   • Precordial pain   • Chronic anticoagulation   • Hypokalemia   • Anemia, unspecified type   • Generalized weakness     Past Medical History:   Diagnosis Date   • Anemia    • Arthritis    • CHF (congestive heart failure) (HCC)    • Chronic kidney disease    • Coronary artery disease    • H/O blood clots    • History of transfusion     no reaction    • Hypertension      Past Surgical History:   Procedure Laterality Date   • ABDOMINAL SURGERY     • APPENDECTOMY     • CARDIAC SURGERY     •  SECTION     • COLONOSCOPY N/A 3/7/2018    Procedure: COLONOSCOPY;  Surgeon: Willie Gresham MD;  Location:  [facility];  Service:    • CORONARY ANGIOPLASTY WITH STENT PLACEMENT     • ENDOSCOPY N/A 3/6/2018    Procedure: ESOPHAGOGASTRODUODENOSCOPY;  Surgeon: Willie Gresham MD;  Location:  [facility];  Service:    • PACEMAKER IMPLANTATION      left side    • SKIN BIOPSY     • TUBAL ABDOMINAL LIGATION       PT Assessment (last 12 hours)     PT Evaluation and Treatment     Row Name 23 1255          Physical Therapy Time and Intention    Subjective Information no complaints  -CT     Document Type therapy note (daily note)  -CT     Mode of Treatment physical  therapy;individual therapy  -CT     Patient Effort good  -CT     Comment Treatment session [id number]. Pelon Dorsey via facetime. Pt ambulated x 3 and performed sitting and standing ther ex.  -CT     Row Name 01/04/23 1255          General Information    Patient Profile Reviewed yes  -CT     Row Name 01/04/23 1255          Cognition    Affect/Mental Status (Cognition) WFL  -CT     Orientation Status (Cognition) oriented x 4  -CT     Follows Commands (Cognition) WFL  -CT     Row Name 01/04/23 1255          Transfers    Transfers sit-stand transfer;stand-sit transfer  -CT     Row Name 01/04/23 1255          Sit-Stand Transfer    Sit-Stand [place] (Transfers) minimum assist (75% patient effort);verbal cues  -CT     Assistive Device (Sit-Stand Transfers) walker, front-wheeled  -CT     Row Name 01/04/23 1255          Stand-Sit Transfer    Stand-Sit [place] (Transfers) minimum assist (75% patient effort)  -CT     Assistive Device (Stand-Sit Transfers) walker, front-wheeled  -CT     Row Name 01/04/23 1255          Gait/Stairs (Locomotion)    [place] Level (Gait) minimum assist (75% patient effort);1 person assist;verbal cues;nonverbal cues (demo/gesture)  -CT     Assistive Device (Gait) walker, front-wheeled  -CT     Distance in Feet (Gait) 14, 8, 5  -CT     Pattern (Gait) step-to  -CT     Deviations/Abnormal Patterns (Gait) stride length decreased;gait speed decreased;festinating/shuffling;weight shifting decreased  -CT     Bilateral Gait Deviations weight shift ability decreased;forward flexed posture  -CT     Row Name 01/04/23 1255          Motor Skills    Therapeutic Exercise --  BLE seated ther ex: seated marches, LAQ, AP, hip abd/add, glut sets 25 reps; standing ther ex: standing marches, mini squats 15 reps  -CT     Row Name 01/04/23 1255          Coping    Observed Emotional State calm;cooperative;pleasant  -CT     Verbalized Emotional State acceptance  -CT     Row Name 01/04/23 1255          Positioning  and Restraints    Pre-Treatment Position sitting in chair/recliner  -CT     Post Treatment Position chair  -CT     In Chair sitting;call light within reach;encouraged to call for assist;notified nsg  -CT           User Key  (r) = Recorded By, (t) = Taken By, (c) = Cosigned By    Initials Name Provider Type    CT Bhargavi Vásquez PT Physical Therapist                Physical Therapy Education     Title: PT OT SLP Therapies (Done)     Topic: Physical Therapy (Done)     Point: Mobility training (Done)     Learning Progress Summary           Patient Acceptance, E,TB, DU,VU by BD at 1/3/2023 0838    Acceptance, TB,E, VU,DU by BD at 1/2/2023 0907    Acceptance, TB,E, VU,DU by BD at 1/1/2023 0846    Acceptance, E,TB, VU by RM at 12/30/2022 1035    Acceptance, E,TB, VU by AC at 12/28/2022 1505    Acceptance, E,TB, VU by AC at 12/27/2022 1513    Acceptance, E, VU by CL at 12/26/2022 1325    Acceptance, E, VU by CL at 12/22/2022 1826    Acceptance, E, VU by CL at 12/21/2022 1303    Acceptance, E, VU by EB at 12/20/2022 1644    Acceptance, E, VU by CL at 12/17/2022 1703    Acceptance, E,D, VU,NR by LL at 12/16/2022 1605    Acceptance, E,TB, VU by AC at 12/16/2022 1523    Acceptance, E,TB, VU by AC at 12/15/2022 1618    Acceptance, E,TB, VU by AC at 12/14/2022 1503    Acceptance, E,TB, VU by DL at 12/13/2022 0240    Acceptance, D,E, VU,NR by LL at 12/12/2022 1455    Acceptance, E,TB, VU by RW at 12/9/2022 1451    Acceptance, E,TB, VU by BC at 11/27/2022 1319    Acceptance, E,TB, VU by RM at 11/20/2022 1449    Acceptance, E,TB, VU by RM at 11/18/2022 1021    Acceptance, E,TB, VU by RH at 11/16/2022 1110    Acceptance, E,TB, VU by KK at 11/14/2022 1638    Acceptance, E,TB, VU by KK at 11/13/2022 1648    Acceptance, E,TB, VU by KG at 11/10/2022 1827    Acceptance, E,TB, VU by DM at 11/9/2022 1638    Acceptance, E,TB, VU by KK at 11/8/2022 1754    Acceptance, E,TB, VU by KK at 11/7/2022 1635    Acceptance, E,D, VU,NR by LL at  11/5/2022 1325    Acceptance, E, VU by EB at 11/4/2022 1448    Acceptance, E,TB, VU by KM at 11/4/2022 1101   Family Acceptance, D,E, VU,NR by LL at 12/12/2022 1455                   Point: Home exercise program (Done)     Learning Progress Summary           Patient Acceptance, E,TB, DU,VU by BD at 1/3/2023 0838    Acceptance, TB,E, VU,DU by BD at 1/2/2023 0907    Acceptance, TB,E, VU,DU by BD at 1/1/2023 0846    Acceptance, E,TB, VU by RM at 12/30/2022 1035    Acceptance, E,TB, VU by AC at 12/28/2022 1505    Acceptance, E,TB, VU by AC at 12/27/2022 1513    Acceptance, E, VU by CL at 12/26/2022 1325    Acceptance, E, VU by CL at 12/22/2022 1826    Acceptance, E, VU by CL at 12/21/2022 1303    Acceptance, E, VU by EB at 12/20/2022 1644    Acceptance, E, VU by CL at 12/17/2022 1703    Acceptance, E,D, VU,NR by LL at 12/16/2022 1605    Acceptance, E,TB, VU by AC at 12/16/2022 1523    Acceptance, E,TB, VU by AC at 12/15/2022 1618    Acceptance, E,TB, VU by AC at 12/14/2022 1503    Acceptance, E,TB, VU by DL at 12/13/2022 0240    Acceptance, D,E, VU,NR by LL at 12/12/2022 1455    Acceptance, E,TB, VU by RW at 12/9/2022 1451    Acceptance, E,TB, VU by BC at 11/27/2022 1319    Acceptance, E,TB, VU by RM at 11/20/2022 1449    Acceptance, E,TB, VU by RM at 11/18/2022 1021    Acceptance, E,TB, VU by RH at 11/16/2022 1110    Acceptance, E,TB, VU by KK at 11/14/2022 1638    Acceptance, E,TB, VU by KK at 11/13/2022 1648    Acceptance, E,TB, VU by KG at 11/10/2022 1827    Acceptance, E,TB, VU by DM at 11/9/2022 1638    Acceptance, E,TB, VU by KK at 11/8/2022 1754    Acceptance, E,TB, VU by KK at 11/7/2022 1635    Acceptance, E,D, VU,NR by LL at 11/5/2022 1325    Acceptance, E, VU by EB at 11/4/2022 1448    Acceptance, E,TB, VU by KM at 11/4/2022 1101   Family Acceptance, D,E, VU,NR by LL at 12/12/2022 1455                   Point: Body mechanics (Done)     Learning Progress Summary           Patient Acceptance, E,TB, DU,VU by BD  at 1/3/2023 0838    Acceptance, TB,E, VU,DU by BD at 1/2/2023 0907    Acceptance, TB,E, VU,DU by BD at 1/1/2023 0846    Acceptance, E,TB, VU by RM at 12/30/2022 1035    Acceptance, E,TB, VU by AC at 12/28/2022 1505    Acceptance, E,TB, VU by AC at 12/27/2022 1513    Acceptance, E, VU by CL at 12/26/2022 1325    Acceptance, E, VU by CL at 12/22/2022 1826    Acceptance, E, VU by CL at 12/21/2022 1303    Acceptance, E, VU by EB at 12/20/2022 1644    Acceptance, E, VU by CL at 12/17/2022 1703    Acceptance, E,D, VU,NR by LL at 12/16/2022 1605    Acceptance, E,TB, VU by AC at 12/16/2022 1523    Acceptance, E,TB, VU by AC at 12/15/2022 1618    Acceptance, E,TB, VU by AC at 12/14/2022 1503    Acceptance, E,TB, VU by DL at 12/13/2022 0240    Acceptance, D,E, VU,NR by LL at 12/12/2022 1455    Acceptance, E,TB, VU by RW at 12/9/2022 1451    Acceptance, E,TB, VU by BC at 11/27/2022 1319    Acceptance, E,TB, VU by RM at 11/20/2022 1449    Acceptance, E,TB, VU by RM at 11/18/2022 1021    Acceptance, E,TB, VU by RH at 11/16/2022 1110    Acceptance, E,TB, VU by KK at 11/14/2022 1638    Acceptance, E,TB, VU by KK at 11/13/2022 1648    Acceptance, E,TB, VU by KG at 11/10/2022 1827    Acceptance, E,TB, VU by DM at 11/9/2022 1638    Acceptance, E,TB, VU by KK at 11/8/2022 1754    Acceptance, E,TB, VU by KK at 11/7/2022 1635    Acceptance, E,D, VU,NR by LL at 11/5/2022 1325    Acceptance, E, VU by EB at 11/4/2022 1448    Acceptance, E,TB, VU by KM at 11/4/2022 1101   Family Acceptance, D,E, VU,NR by LL at 12/12/2022 1455                   Point: Precautions (Done)     Learning Progress Summary           Patient Acceptance, E,TB, DU,VU by BD at 1/3/2023 0838    Acceptance, TB,E, VU,DU by BD at 1/2/2023 0907    Acceptance, TB,E, VU,DU by BD at 1/1/2023 0846    Acceptance, E,TB, VU by RM at 12/30/2022 1035    Acceptance, E,TB, VU by AC at 12/28/2022 1505    Acceptance, E,TB, VU by AC at 12/27/2022 1513    Acceptance, E, VU by CL at  12/26/2022 1325    Acceptance, E, VU by CL at 12/22/2022 1826    Acceptance, E, VU by CL at 12/21/2022 1303    Acceptance, E, VU by EB at 12/20/2022 1644    Acceptance, E, VU by CL at 12/17/2022 1703    Acceptance, E,D, VU,NR by LL at 12/16/2022 1605    Acceptance, E,TB, VU by AC at 12/16/2022 1523    Acceptance, E,TB, VU by AC at 12/15/2022 1618    Acceptance, E,TB, VU by AC at 12/14/2022 1503    Acceptance, E,TB, VU by DL at 12/13/2022 0240    Acceptance, D,E, VU,NR by LL at 12/12/2022 1455    Acceptance, E,TB, VU by RW at 12/9/2022 1451    Acceptance, E,TB, VU by BC at 11/27/2022 1319    Acceptance, E,TB, VU by RM at 11/20/2022 1449    Acceptance, E,TB, VU by RM at 11/18/2022 1021    Acceptance, E,TB, VU by RH at 11/16/2022 1110    Acceptance, E,TB, VU by KK at 11/14/2022 1638    Acceptance, E,TB, VU by KK at 11/13/2022 1648    Acceptance, E,TB, VU by KG at 11/10/2022 1827    Acceptance, E,TB, VU by DM at 11/9/2022 1638    Acceptance, E,TB, VU by KK at 11/8/2022 1754    Acceptance, E,TB, VU by KK at 11/7/2022 1635    Acceptance, E,D, VU,NR by LL at 11/5/2022 1325    Acceptance, E, VU by EB at 11/4/2022 1448    Acceptance, E,TB, VU by KM at 11/4/2022 1101   Family Acceptance, D,E, VU,NR by LL at 12/12/2022 1455                               User Key     Initials Effective Dates Name Provider Type Discipline    CL 06/16/21 -  Mitzy Mustafa, RN Registered Nurse Nurse    LL 05/02/16 -  Neda aGrcia PTA Physical Therapist Assistant PT    RM 06/14/22 -  Bethany Krause, RN Registered Nurse Nurse    DM 06/16/21 -  Moniz, Deborah, RN Registered Nurse Nurse    BC 08/02/22 -  Collett, Brittany, RN Registered Nurse Nurse    AC 10/13/22 -  Jaya Castillo, RN Registered Nurse Nurse    BD 08/05/21 -  Radha Lynn, RN Registered Nurse Nurse    KM 05/24/22 -  Mao Vázquez, PT Physical Therapist PT    EB 09/22/22 -  Cedric Oliver, RN Registered Nurse Nurse    KK 06/27/22 -  Chelsi Bolivar, RN Registered Nurse Nurse     DL 03/16/22 -  Francy Garcia, RN Registered Nurse Nurse    KG 09/22/22 -  Verónica Rodriguez, RN Registered Nurse Nurse    RW 09/22/22 -  Bethany Schofield, RN Registered Nurse Nurse    RH 08/03/22 - 12/21/22 Jung Russell, SONAM Extern Registered Nurse Nurse              PT Recommendation and Plan  Anticipated Discharge Disposition (PT): home with /[facility], [facility] nursing [facility]  Planned Therapy Interventions (PT): balance training, bed mobility training, gait training, home exercise program, manual therapy techniques, motor coordination training, neuromuscular re-education, patient/family education, postural re-education, strengthening, transfer training  Therapy Frequency (PT): 5 times/wk (5-6 times/wk)  Progress Summary (PT)  Progress Toward Functional Goals (PT): progress toward functional goals is gradual  Plan of Care Reviewed With: patient  Progress: improving   Outcome Measures     Row Name 01/03/23 1453 01/02/23 1649          How much help from another is currently needed...    Putting on and taking off regular lower body clothing? 3  -KP 3  -KR     Bathing (including washing, rinsing, and drying) 3  -KP 3  -KR     Toileting (which includes using toilet bed pan or urinal) 3  -KP 3  -KR     Putting on and taking off regular upper body clothing 3  -KP 3  -KR     Taking care of personal grooming (such as brushing teeth) 3  -KP 3  -KR     Eating meals 4  -KP 4  -KR     AM-PAC 6 Clicks Score (OT) 19  -KP 19  -KR           User Key  (r) = Recorded By, (t) = Taken By, (c) = Cosigned By    Initials Name Provider Type    Andres Ayala OT Occupational Therapist    Linn Richardson, VELASQUEZ Occupational Therapist                 Time Calculation:    PT Charges     Row Name 01/04/23 1436             Time Calculation    Start Time 1255  -CT      Stop Time 1333  -CT      Time Calculation (min) 38 min  -CT      PT Received On 01/04/23  -CT            User Key  (r) = Recorded By, (t) = Taken By, (c) = Cosigned By     Initials Name Provider Type    CT Bhargavi Vásquez, PT Physical Therapist              Therapy Charges for Today     Code Description Service Date Service Provider Modifiers Qty    92471018370 HC GAIT TRAINING EA 15 MIN 1/3/2023 Bhargavi Vásquez, PT GP 1    08696747445 HC PT THERAPEUTIC ACT EA 15 MIN 1/3/2023 Bhargavi Vásquez, PT GP 1    21757091086 HC PT THER PROC EA 15 MIN 1/3/2023 Bhargavi Vásquez, PT GP 1    21693452023 HC GAIT TRAINING EA 15 MIN 1/4/2023 Bhargavi Vásquez, PT GP 1    77658080407 HC PT THER PROC EA 15 MIN 1/4/2023 Bhargavi Vásquez, PT GP 2          PT G-Codes  Outcome Measure Options: AM-PAC 6 Clicks Daily Activity (OT)  AM-PAC 6 Clicks Score (PT): 14  AM-PAC 6 Clicks Score (OT): 19    Bhargavi Vásquez, EDEL  1/4/2023

## 2023-01-04 NOTE — THERAPY TREATMENT NOTE
Acute Care - Occupational Therapy Treatment Note   [facility]     Patient Name: Tre Kinney  : 1932  MRN: [id number]  Today's Date: 2023  Onset of Illness/Injury or Date of Surgery: 10/06/22     Referring Physician: Janneth    Admit Date: 10/5/2022     No diagnosis found.  Patient Active Problem List   Diagnosis   • Acid reflux   • Ankle arthralgia   • Cardiac conduction disorder   • Closed fracture of distal fibula   • Arteriosclerosis of coronary artery   • Fracture of distal end of tibia   • Elevated cholesterol   • BP (high blood pressure)   • Hypertrophic polyarthritis   • OP (osteoporosis)   • Right heart failure (HCC)   • Heart failure, chronic, right-sided (HCC)   • SOB (shortness of breath)   • Palpitations   • CHF (congestive heart failure) (HCC)   • Congestive heart failure (HCC)   • Precordial pain   • Chronic anticoagulation   • Hypokalemia   • Anemia, unspecified type   • Generalized weakness     Past Medical History:   Diagnosis Date   • Anemia    • Arthritis    • CHF (congestive heart failure) ([facility])    • Chronic kidney disease    • Coronary artery disease    • H/O blood clots    • History of transfusion     no reaction    • Hypertension      Past Surgical History:   Procedure Laterality Date   • ABDOMINAL SURGERY     • APPENDECTOMY     • CARDIAC SURGERY     •  SECTION     • COLONOSCOPY N/A 3/7/2018    Procedure: COLONOSCOPY;  Surgeon: Willie Gresham MD;  Location:  [facility];  Service:    • CORONARY ANGIOPLASTY WITH STENT PLACEMENT     • ENDOSCOPY N/A 3/6/2018    Procedure: ESOPHAGOGASTRODUODENOSCOPY;  Surgeon: Willie Gresham MD;  Location:  [facility];  Service:    • PACEMAKER IMPLANTATION      left side    • SKIN BIOPSY     • TUBAL ABDOMINAL LIGATION           OT ASSESSMENT FLOWSHEET (last 12 hours)     OT Evaluation and Treatment     Row Name 23 1508                   OT Time and Intention    Subjective Information no complaints  -LM        Document Type  therapy note (daily note)  -LM        Mode of Treatment occupational therapy  -LM        Patient Effort good  -LM        Comment Patient seen this pm for light bue arom/therex.  Patient performed light bue arom and strengthening tasks while seated in bedside recliner.  Patient performed yellow tband and towel arom therex with min cues.  Frequent rest breaks.  [id number]  -LM           General Information    Existing Precautions/Restrictions fall  -LM           Positioning and Restraints    In Chair call light within reach;encouraged to call for assist  -LM              User Key  (r) = Recorded By, (t) = Taken By, (c) = Cosigned By    Initials Name Effective Dates    LM Татьяна Bah, OT 06/16/21 -                        OT Recommendation and Plan           Outcome Measures     Row Name 01/04/23 1500 01/03/23 1453 01/02/23 1649       How much help from another is currently needed...    Putting on and taking off regular lower body clothing? 3  -LM 3  -KP 3  -KR    Bathing (including washing, rinsing, and drying) 3  -LM 3  -KP 3  -KR    Toileting (which includes using toilet bed pan or urinal) 3  -LM 3  -KP 3  -KR    Putting on and taking off regular upper body clothing 3  -LM 3  -KP 3  -KR    Taking care of personal grooming (such as brushing teeth) 3  -LM 3  -KP 3  -KR    Eating meals 4  -LM 4  -KP 4  -KR    AM-PAC 6 Clicks Score (OT) 19  -LM 19  -KP 19  -KR       Functional Assessment    Outcome Measure Options AM-PAC 6 Clicks Daily Activity (OT)  -LM -- --          User Key  (r) = Recorded By, (t) = Taken By, (c) = Cosigned By    Initials Name Provider Type    Andres Ayala, OT Occupational Therapist    LM Татьяна Bah, OT Occupational Therapist    Linn Richardson, OT Occupational Therapist                Time Calculation:    Time Calculation- OT     Row Name 01/04/23 1510             Time Calculation- OT    Total Timed Code Minutes- OT 15 minute(s)  -LM            User Key  (r) = Recorded By, (t) = Taken  By, (c) = Cosigned By    Initials Name Provider Type    LM Татьяна Bah, OT Occupational Therapist              Therapy Charges for Today     Code Description Service Date Service Provider Modifiers Qty    44601668133  OT THER PROC EA 15 MIN 1/4/2023 Татьяна Bah OT GO 1               Татьяна Bah OT  1/4/2023

## 2023-01-04 NOTE — PLAN OF CARE
Goal Outcome Evaluation:              Outcome Evaluation: patient has rested well tonight. patient has had episodes of confusion at times but easily reorientated. patient states it is hard for her to sleep due to the kids talking in the [place]. patient has sat in the chair some during shift.

## 2023-01-04 NOTE — PLAN OF CARE
Goal Outcome Evaluation:  Plan of Care Reviewed With: patient        Progress: no change  Outcome Evaluation: Pt has no c/o, no s/s of distress noted, nice and pleasant. Worked with PT/OT, sat up in chair. Will con't to monitor.

## 2023-01-05 PROCEDURE — 97110 THERAPEUTIC EXERCISES: CPT

## 2023-01-05 PROCEDURE — 97116 GAIT TRAINING THERAPY: CPT

## 2023-01-05 PROCEDURE — 97535 SELF CARE MNGMENT TRAINING: CPT

## 2023-01-05 RX ADMIN — FUROSEMIDE 40 MG: 40 TABLET ORAL at 17:55

## 2023-01-05 RX ADMIN — PANTOPRAZOLE SODIUM 40 MG: 40 TABLET, DELAYED RELEASE ORAL at 05:07

## 2023-01-05 RX ADMIN — Medication 1 TABLET: at 09:43

## 2023-01-05 RX ADMIN — APIXABAN 5 MG: 5 TABLET, FILM COATED ORAL at 09:42

## 2023-01-05 RX ADMIN — DICLOFENAC 4 G: 10 GEL TOPICAL at 13:37

## 2023-01-05 RX ADMIN — GABAPENTIN 300 MG: 300 CAPSULE ORAL at 20:59

## 2023-01-05 RX ADMIN — ESCITALOPRAM 10 MG: 10 TABLET, FILM COATED ORAL at 09:43

## 2023-01-05 RX ADMIN — DICLOFENAC 4 G: 10 GEL TOPICAL at 21:00

## 2023-01-05 RX ADMIN — FUROSEMIDE 40 MG: 40 TABLET ORAL at 09:43

## 2023-01-05 RX ADMIN — DICLOFENAC 4 G: 10 GEL TOPICAL at 09:41

## 2023-01-05 RX ADMIN — ISOSORBIDE MONONITRATE 30 MG: 30 TABLET, EXTENDED RELEASE ORAL at 09:41

## 2023-01-05 RX ADMIN — DICLOFENAC 4 G: 10 GEL TOPICAL at 17:55

## 2023-01-05 RX ADMIN — METOPROLOL TARTRATE 25 MG: 25 TABLET, FILM COATED ORAL at 20:59

## 2023-01-05 RX ADMIN — METOPROLOL TARTRATE 25 MG: 25 TABLET, FILM COATED ORAL at 09:42

## 2023-01-05 RX ADMIN — MAGNESIUM GLUCONATE 500 MG ORAL TABLET 400 MG: 500 TABLET ORAL at 09:43

## 2023-01-05 RX ADMIN — DOCUSATE SODIUM 100 MG: 100 CAPSULE ORAL at 20:59

## 2023-01-05 RX ADMIN — DOCUSATE SODIUM 100 MG: 100 CAPSULE ORAL at 09:42

## 2023-01-05 RX ADMIN — TAMSULOSIN HYDROCHLORIDE 0.4 MG: 0.4 CAPSULE ORAL at 09:44

## 2023-01-05 RX ADMIN — APIXABAN 5 MG: 5 TABLET, FILM COATED ORAL at 20:59

## 2023-01-05 RX ADMIN — Medication 10 MG: at 20:59

## 2023-01-05 NOTE — THERAPY TREATMENT NOTE
Acute Care - Occupational Therapy Treatment Note   [place]     Patient Name: Tre Kinney  : 1932  MRN: [id number]  Today's Date: 2023  Onset of Illness/Injury or Date of Surgery: 10/06/22     Referring Physician: Janneth    Admit Date: 10/5/2022     No diagnosis found.  Patient Active Problem List   Diagnosis   • Acid reflux   • Ankle arthralgia   • Cardiac conduction disorder   • Closed fracture of distal fibula   • Arteriosclerosis of coronary artery   • Fracture of distal end of tibia   • Elevated cholesterol   • BP (high blood pressure)   • Hypertrophic polyarthritis   • OP (osteoporosis)   • Right heart failure (HCC)   • Heart failure, chronic, right-sided (HCC)   • SOB (shortness of breath)   • Palpitations   • CHF (congestive heart failure) (HCC)   • Congestive heart failure (HCC)   • Precordial pain   • Chronic anticoagulation   • Hypokalemia   • Anemia, unspecified type   • Generalized weakness     Past Medical History:   Diagnosis Date   • Anemia    • Arthritis    • CHF (congestive heart failure) ([facility])    • Chronic kidney disease    • Coronary artery disease    • H/O blood clots    • History of transfusion     no reaction    • Hypertension      Past Surgical History:   Procedure Laterality Date   • ABDOMINAL SURGERY     • APPENDECTOMY     • CARDIAC SURGERY     •  SECTION     • COLONOSCOPY N/A 3/7/2018    Procedure: COLONOSCOPY;  Surgeon: Willie Gresham MD;  Location:  [facility];  Service:    • CORONARY ANGIOPLASTY WITH STENT PLACEMENT     • ENDOSCOPY N/A 3/6/2018    Procedure: ESOPHAGOGASTRODUODENOSCOPY;  Surgeon: Willie Gresham MD;  Location:  [facility];  Service:    • PACEMAKER IMPLANTATION      left side    • SKIN BIOPSY     • TUBAL ABDOMINAL LIGATION           OT ASSESSMENT FLOWSHEET (last 12 hours)     OT Evaluation and Treatment     Row Name 23 1403                   OT Time and Intention    Subjective Information no complaints  -LM        Document Type  therapy note (daily note)  -LM        Mode of Treatment occupational therapy  -LM        Patient Effort good  -LM        Comment Patient seen this date for light bue therex and BADL.  Patient peformed LE dressing donning  pants with mod assist using rw.  Patient performed toilet transfer to [facility] with min/mod assist.  Patient completed yellow tband and light towel arom taslks while seated in bedside recliner.  [id number]  -LM           Cognition    Affect/Mental Status (Cognition) WFL  -LM           Positioning and Restraints    Post Treatment Position chair  -LM        In Chair call light within reach;encouraged to call for assist  -LM              User Key  (r) = Recorded By, (t) = Taken By, (c) = Cosigned By    Initials Name Effective Dates    LM Татьяна Bah, OT 06/16/21 -                        OT Recommendation and Plan           Outcome Measures     Row Name 01/05/23 1408 01/04/23 1500 01/03/23 1453       How much help from another is currently needed...    Putting on and taking off regular lower body clothing? 3  -LM 3  -LM 3  -KP    Bathing (including washing, rinsing, and drying) 3  -LM 3  -LM 3  -KP    Toileting (which includes using toilet bed pan or urinal) 3  -LM 3  -LM 3  -KP    Putting on and taking off regular upper body clothing 3  -LM 3  -LM 3  -KP    Taking care of personal grooming (such as brushing teeth) 3  -LM 3  -LM 3  -KP    Eating meals 4  -LM 4  -LM 4  -KP    AM-PAC 6 Clicks Score (OT) 19  -LM 19  -LM 19  -KP       Functional Assessment    Outcome Measure Options AM-PAC 6 Clicks Daily Activity (OT)  -LM AM-PAC 6 Clicks Daily Activity (OT)  -LM --    Row Name 01/02/23 1649             How much help from another is currently needed...    Putting on and taking off regular lower body clothing? 3  -KR      Bathing (including washing, rinsing, and drying) 3  -KR      Toileting (which includes using toilet bed pan or urinal) 3  -KR      Putting on and taking off regular upper body clothing 3  -KR       Taking care of personal grooming (such as brushing teeth) 3  -KR      Eating meals 4  -KR      AM-PAC 6 Clicks Score (OT) 19  -KR            User Key  (r) = Recorded By, (t) = Taken By, (c) = Cosigned By    Initials Name Provider Type    Andres Ayala, OT Occupational Therapist    Татьяна Mccabe, OT Occupational Therapist    Linn Richardson, OT Occupational Therapist                Time Calculation:    Time Calculation- OT     Row Name 01/05/23 1409             Time Calculation- OT    Total Timed Code Minutes- OT 25 minute(s)  -            User Key  (r) = Recorded By, (t) = Taken By, (c) = Cosigned By    Initials Name Provider Type    Татьяна Mccabe, OT Occupational Therapist              Therapy Charges for Today     Code Description Service Date Service Provider Modifiers Qty    65942819057 HC OT THER PROC EA 15 MIN 1/4/2023 Татьяна Bah, OT GO 1    80319881035 HC OT SELF CARE/MGMT/TRAIN EA 15 MIN 1/5/2023 Татьяна Bah OT GO 1    10744205461 HC OT THER PROC EA 15 MIN 1/5/2023 Татьяна Bah OT GO 1               Татьяна Bah OT  1/5/2023

## 2023-01-05 NOTE — THERAPY TREATMENT NOTE
Acute Care - Physical Therapy Treatment Note   [place]     Patient Name: Tre Kinney  : 1932  MRN: [id number]  Today's Date: 2023   Onset of Illness/Injury or Date of Surgery: 10/06/22  Visit Dx:   No diagnosis found.  Patient Active Problem List   Diagnosis   • Acid reflux   • Ankle arthralgia   • Cardiac conduction disorder   • Closed fracture of distal fibula   • Arteriosclerosis of coronary artery   • Fracture of distal end of tibia   • Elevated cholesterol   • BP (high blood pressure)   • Hypertrophic polyarthritis   • OP (osteoporosis)   • Right heart failure (HCC)   • Heart failure, chronic, right-sided (HCC)   • SOB (shortness of breath)   • Palpitations   • CHF (congestive heart failure) (HCC)   • Congestive heart failure (HCC)   • Precordial pain   • Chronic anticoagulation   • Hypokalemia   • Anemia, unspecified type   • Generalized weakness     Past Medical History:   Diagnosis Date   • Anemia    • Arthritis    • CHF (congestive heart failure) (HCC)    • Chronic kidney disease    • Coronary artery disease    • H/O blood clots    • History of transfusion     no reaction    • Hypertension      Past Surgical History:   Procedure Laterality Date   • ABDOMINAL SURGERY     • APPENDECTOMY     • CARDIAC SURGERY     •  SECTION     • COLONOSCOPY N/A 3/7/2018    Procedure: COLONOSCOPY;  Surgeon: Willie Gresham MD;  Location:  [facility];  Service:    • CORONARY ANGIOPLASTY WITH STENT PLACEMENT     • ENDOSCOPY N/A 3/6/2018    Procedure: ESOPHAGOGASTRODUODENOSCOPY;  Surgeon: Willie Gresham MD;  Location:  [facility];  Service:    • PACEMAKER IMPLANTATION      left side    • SKIN BIOPSY     • TUBAL ABDOMINAL LIGATION       PT Assessment (last 12 hours)     PT Evaluation and Treatment     Row Name 23 1451          Physical Therapy Time and Intention    Subjective Information no complaints  -CT     Document Type therapy note (daily note)  -CT     Mode of Treatment physical  therapy;individual therapy  -CT     Patient Effort good  -CT     Comment Pt treatment session [id number]. Pelon Dorsey on facetime briefly at begining of session. Pt ambulated x 2 and performed seated and standing ther ex.  -CT     Row Name 01/05/23 1451          General Information    Patient Profile Reviewed yes  -CT     Row Name 01/05/23 1451          Cognition    Affect/Mental Status (Cognition) WFL  -CT     Orientation Status (Cognition) oriented x 4  -CT     Follows Commands (Cognition) WFL  -CT     Row Name 01/05/23 1451          Transfers    Transfers sit-stand transfer;stand-sit transfer  -CT     Row Name 01/05/23 1451          Sit-Stand Transfer    Sit-Stand [place] (Transfers) minimum assist (75% patient effort)  -CT     Assistive Device (Sit-Stand Transfers) walker, front-wheeled  -CT     Row Name 01/05/23 1451          Stand-Sit Transfer    Stand-Sit [place] (Transfers) minimum assist (75% patient effort)  -CT     Assistive Device (Stand-Sit Transfers) walker, front-wheeled  -CT     Row Name 01/05/23 1451          Gait/Stairs (Locomotion)    Beggs Level (Gait) minimum assist (75% patient effort);1 person assist;verbal cues;nonverbal cues (demo/gesture)  -CT     Assistive Device (Gait) walker, front-wheeled  -CT     Distance in Feet (Gait) 11 x 2  -CT     Pattern (Gait) step-to  -CT     Deviations/Abnormal Patterns (Gait) stride length decreased;gait speed decreased;festinating/shuffling;weight shifting decreased  -CT     Bilateral Gait Deviations weight shift ability decreased;forward flexed posture  -CT     Row Name 01/05/23 1451          Motor Skills    Therapeutic Exercise --  BLE seated ther ex: seated marches, LAQ, AP, hip abd/add, glut sets 25 reps; standing ther ex: standing marches, mini squats 10 reps  -CT     Row Name 01/05/23 1451          Coping    Observed Emotional State calm;cooperative;pleasant  -CT     Verbalized Emotional State acceptance  -CT     Row Name 01/05/23 1451           Plan of Care Review    Plan of Care Reviewed With patient  -CT     Row Name 01/05/23 1451          Positioning and Restraints    Pre-Treatment Position sitting in chair/recliner  -CT     Post Treatment Position chair  -CT     In Chair sitting;call light within reach;encouraged to call for assist  -CT           User Key  (r) = Recorded By, (t) = Taken By, (c) = Cosigned By    Initials Name Provider Type    CT Bhargavi Vásquez PT Physical Therapist                Physical Therapy Education     Title: PT OT SLP Therapies (Done)     Topic: Physical Therapy (Done)     Point: Mobility training (Done)     Learning Progress Summary           Patient Acceptance, E, VU by CL at 1/5/2023 1414    Acceptance, E,TB, DU,VU by BD at 1/3/2023 0838    Acceptance, TB,E, VU,DU by BD at 1/2/2023 0907    Acceptance, TB,E, VU,DU by BD at 1/1/2023 0846    Acceptance, E,TB, VU by RM at 12/30/2022 1035    Acceptance, E,TB, VU by AC at 12/28/2022 1505    Acceptance, E,TB, VU by AC at 12/27/2022 1513    Acceptance, E, VU by CL at 12/26/2022 1325    Acceptance, E, VU by CL at 12/22/2022 1826    Acceptance, E, VU by CL at 12/21/2022 1303    Acceptance, E, VU by EB at 12/20/2022 1644    Acceptance, E, VU by CL at 12/17/2022 1703    Acceptance, E,D, VU,NR by LL at 12/16/2022 1605    Acceptance, E,TB, VU by AC at 12/16/2022 1523    Acceptance, E,TB, VU by AC at 12/15/2022 1618    Acceptance, E,TB, VU by AC at 12/14/2022 1503    Acceptance, E,TB, VU by DL at 12/13/2022 0240    Acceptance, D,E, VU,NR by LL at 12/12/2022 1455    Acceptance, E,TB, VU by RW at 12/9/2022 1451    Acceptance, E,TB, VU by BC at 11/27/2022 1319    Acceptance, E,TB, VU by RM at 11/20/2022 1449    Acceptance, E,TB, VU by RM at 11/18/2022 1021    Acceptance, E,TB, VU by RH at 11/16/2022 1110    Acceptance, E,TB, VU by KK at 11/14/2022 1638    Acceptance, E,TB, VU by KK at 11/13/2022 1648    Acceptance, E,TB, VU by KG at 11/10/2022 1827    Acceptance, E,TB, VU by DM at  11/9/2022 1638    Acceptance, E,TB, VU by KK at 11/8/2022 1754    Acceptance, E,TB, VU by KK at 11/7/2022 1635    Acceptance, E,D, VU,NR by LL at 11/5/2022 1325    Acceptance, E, VU by EB at 11/4/2022 1448    Acceptance, E,TB, VU by KM at 11/4/2022 1101   Family Acceptance, D,E, VU,NR by LL at 12/12/2022 1455                   Point: Home exercise program (Done)     Learning Progress Summary           Patient Acceptance, E, VU by CL at 1/5/2023 1414    Acceptance, E,TB, DU,VU by BD at 1/3/2023 0838    Acceptance, TB,E, VU,DU by BD at 1/2/2023 0907    Acceptance, TB,E, VU,DU by BD at 1/1/2023 0846    Acceptance, E,TB, VU by RM at 12/30/2022 1035    Acceptance, E,TB, VU by AC at 12/28/2022 1505    Acceptance, E,TB, VU by AC at 12/27/2022 1513    Acceptance, E, VU by CL at 12/26/2022 1325    Acceptance, E, VU by CL at 12/22/2022 1826    Acceptance, E, VU by CL at 12/21/2022 1303    Acceptance, E, VU by EB at 12/20/2022 1644    Acceptance, E, VU by CL at 12/17/2022 1703    Acceptance, E,D, VU,NR by LL at 12/16/2022 1605    Acceptance, E,TB, VU by AC at 12/16/2022 1523    Acceptance, E,TB, VU by AC at 12/15/2022 1618    Acceptance, E,TB, VU by AC at 12/14/2022 1503    Acceptance, E,TB, VU by DL at 12/13/2022 0240    Acceptance, D,E, VU,NR by LL at 12/12/2022 1455    Acceptance, E,TB, VU by RW at 12/9/2022 1451    Acceptance, E,TB, VU by BC at 11/27/2022 1319    Acceptance, E,TB, VU by RM at 11/20/2022 1449    Acceptance, E,TB, VU by RM at 11/18/2022 1021    Acceptance, E,TB, VU by RH at 11/16/2022 1110    Acceptance, E,TB, VU by KK at 11/14/2022 1638    Acceptance, E,TB, VU by KK at 11/13/2022 1648    Acceptance, E,TB, VU by KG at 11/10/2022 1827    Acceptance, E,TB, VU by DM at 11/9/2022 1638    Acceptance, E,TB, VU by KK at 11/8/2022 1754    Acceptance, E,TB, VU by KK at 11/7/2022 1635    Acceptance, E,D, VU,NR by LL at 11/5/2022 1325    Acceptance, E, VU by EB at 11/4/2022 1448    Acceptance, E,TB, VU by KM at  11/4/2022 1101   Family Acceptance, D,E, VU,NR by LL at 12/12/2022 1455                   Point: Body mechanics (Done)     Learning Progress Summary           Patient Acceptance, E, VU by CL at 1/5/2023 1414    Acceptance, E,TB, DU,VU by BD at 1/3/2023 0838    Acceptance, TB,E, VU,DU by BD at 1/2/2023 0907    Acceptance, TB,E, VU,DU by BD at 1/1/2023 0846    Acceptance, E,TB, VU by RM at 12/30/2022 1035    Acceptance, E,TB, VU by AC at 12/28/2022 1505    Acceptance, E,TB, VU by AC at 12/27/2022 1513    Acceptance, E, VU by CL at 12/26/2022 1325    Acceptance, E, VU by CL at 12/22/2022 1826    Acceptance, E, VU by CL at 12/21/2022 1303    Acceptance, E, VU by EB at 12/20/2022 1644    Acceptance, E, VU by CL at 12/17/2022 1703    Acceptance, E,D, VU,NR by LL at 12/16/2022 1605    Acceptance, E,TB, VU by AC at 12/16/2022 1523    Acceptance, E,TB, VU by AC at 12/15/2022 1618    Acceptance, E,TB, VU by AC at 12/14/2022 1503    Acceptance, E,TB, VU by DL at 12/13/2022 0240    Acceptance, D,E, VU,NR by LL at 12/12/2022 1455    Acceptance, E,TB, VU by RW at 12/9/2022 1451    Acceptance, E,TB, VU by BC at 11/27/2022 1319    Acceptance, E,TB, VU by RM at 11/20/2022 1449    Acceptance, E,TB, VU by RM at 11/18/2022 1021    Acceptance, E,TB, VU by RH at 11/16/2022 1110    Acceptance, E,TB, VU by KK at 11/14/2022 1638    Acceptance, E,TB, VU by KK at 11/13/2022 1648    Acceptance, E,TB, VU by KG at 11/10/2022 1827    Acceptance, E,TB, VU by DM at 11/9/2022 1638    Acceptance, E,TB, VU by KK at 11/8/2022 1754    Acceptance, E,TB, VU by KK at 11/7/2022 1635    Acceptance, E,D, VU,NR by LL at 11/5/2022 1325    Acceptance, E, VU by EB at 11/4/2022 1448    Acceptance, E,TB, VU by KM at 11/4/2022 1101   Family Acceptance, D,E, VU,NR by LL at 12/12/2022 1455                   Point: Precautions (Done)     Learning Progress Summary           Patient Acceptance, E, VU by CL at 1/5/2023 1414    Acceptance, E,TB, DU,VU by BD at 1/3/2023  0838    Acceptance, TB,E, VU,DU by BD at 1/2/2023 0907    Acceptance, TB,E, VU,DU by BD at 1/1/2023 0846    Acceptance, E,TB, VU by RM at 12/30/2022 1035    Acceptance, E,TB, VU by AC at 12/28/2022 1505    Acceptance, E,TB, VU by AC at 12/27/2022 1513    Acceptance, E, VU by CL at 12/26/2022 1325    Acceptance, E, VU by CL at 12/22/2022 1826    Acceptance, E, VU by CL at 12/21/2022 1303    Acceptance, E, VU by EB at 12/20/2022 1644    Acceptance, E, VU by CL at 12/17/2022 1703    Acceptance, E,D, VU,NR by LL at 12/16/2022 1605    Acceptance, E,TB, VU by AC at 12/16/2022 1523    Acceptance, E,TB, VU by AC at 12/15/2022 1618    Acceptance, E,TB, VU by AC at 12/14/2022 1503    Acceptance, E,TB, VU by DL at 12/13/2022 0240    Acceptance, D,E, VU,NR by LL at 12/12/2022 1455    Acceptance, E,TB, VU by RW at 12/9/2022 1451    Acceptance, E,TB, VU by BC at 11/27/2022 1319    Acceptance, E,TB, VU by RM at 11/20/2022 1449    Acceptance, E,TB, VU by RM at 11/18/2022 1021    Acceptance, E,TB, VU by RH at 11/16/2022 1110    Acceptance, E,TB, VU by KK at 11/14/2022 1638    Acceptance, E,TB, VU by KK at 11/13/2022 1648    Acceptance, E,TB, VU by KG at 11/10/2022 1827    Acceptance, E,TB, VU by DM at 11/9/2022 1638    Acceptance, E,TB, VU by KK at 11/8/2022 1754    Acceptance, E,TB, VU by KK at 11/7/2022 1635    Acceptance, E,D, VU,NR by LL at 11/5/2022 1325    Acceptance, E, VU by EB at 11/4/2022 1448    Acceptance, E,TB, VU by KM at 11/4/2022 1101   Family Acceptance, D,E, VU,NR by LL at 12/12/2022 1455                               User Key     Initials Effective Dates Name Provider Type Discipline    CL 06/16/21 -  Mitzy Mustafa, RN Registered Nurse Nurse    LL 05/02/16 -  Neda Garcia PTA Physical Therapist Assistant PT    RM 06/14/22 -  Bethany Krause, RN Registered Nurse Nurse    DM 06/16/21 -  Moniz, Deborah, RN Registered Nurse Nurse    BC 08/02/22 -  Collett, Brittany, RN Registered Nurse Nurse    AC 10/13/22 -   Jaya Castillo, RN Registered Nurse Nurse    BD 08/05/21 -  Radha Lynn, RN Registered Nurse Nurse    KM 05/24/22 -  Mao Vázquez, PT Physical Therapist PT    EB 09/22/22 -  Cedric Oliver, RN Registered Nurse Nurse    KK 06/27/22 -  Chelsi Bolivar, RN Registered Nurse Nurse    DL 03/16/22 -  Francy Garcia, RN Registered Nurse Nurse    KG 09/22/22 -  Verónica Rodriguez, RN Registered Nurse Nurse    RW 09/22/22 -  Bethany Schofield RN Registered Nurse Nurse    RH 08/03/22 - 12/21/22 Jung Russell RN Extern Registered Nurse Nurse              PT Recommendation and Plan  Anticipated Discharge Disposition (PT): home with 24/7 care, skilled nursing facility  Planned Therapy Interventions (PT): balance training, bed mobility training, gait training, home exercise program, manual therapy techniques, motor coordination training, neuromuscular re-education, patient/family education, postural re-education, strengthening, transfer training  Therapy Frequency (PT): 5 times/wk (5-6 times/wk)  Progress Summary (PT)  Progress Toward Functional Goals (PT): progress toward functional goals is gradual  Plan of Care Reviewed With: patient  Progress: improving   Outcome Measures     Row Name 01/05/23 1408 01/04/23 1500 01/03/23 1453       How much help from another is currently needed...    Putting on and taking off regular lower body clothing? 3  -LM 3  -LM 3  -KP    Bathing (including washing, rinsing, and drying) 3  -LM 3  -LM 3  -KP    Toileting (which includes using toilet bed pan or urinal) 3  -LM 3  -LM 3  -KP    Putting on and taking off regular upper body clothing 3  -LM 3  -LM 3  -KP    Taking care of personal grooming (such as brushing teeth) 3  -LM 3  -LM 3  -KP    Eating meals 4  -LM 4  -LM 4  -KP    AM-PAC 6 Clicks Score (OT) 19  -LM 19  -LM 19  -KP       Functional Assessment    Outcome Measure Options AM-PAC 6 Clicks Daily Activity (OT)  -LM AM-PAC 6 Clicks Daily Activity (OT)  -LM --    Row Name 01/02/23 4817              How much help from another is currently needed...    Putting on and taking off regular lower body clothing? 3  -KR      Bathing (including washing, rinsing, and drying) 3  -KR      Toileting (which includes using toilet bed pan or urinal) 3  -KR      Putting on and taking off regular upper body clothing 3  -KR      Taking care of personal grooming (such as brushing teeth) 3  -KR      Eating meals 4  -KR      AM-PAC 6 Clicks Score (OT) 19  -KR            User Key  (r) = Recorded By, (t) = Taken By, (c) = Cosigned By    Initials Name Provider Type    KR Andres Uriarte, OT Occupational Therapist    LM Татьяна Bah, OT Occupational Therapist    KP Linn Quevedo, OT Occupational Therapist                 Time Calculation:    PT Charges     Row Name 01/05/23 1454             Time Calculation    Start Time 1255  -CT      Stop Time 1335  -CT      Time Calculation (min) 40 min  -CT      PT Received On 01/05/23  -CT            User Key  (r) = Recorded By, (t) = Taken By, (c) = Cosigned By    Initials Name Provider Type    CT Bhargavi Vásquez, PT Physical Therapist              Therapy Charges for Today     Code Description Service Date Service Provider Modifiers Qty    44672340767 HC GAIT TRAINING EA 15 MIN 1/4/2023 Bhargavi Vásquez, PT GP 1    96064689368 HC PT THER PROC EA 15 MIN 1/4/2023 Bhargavi Vásquez, PT GP 2    15279122059 HC GAIT TRAINING EA 15 MIN 1/5/2023 Bhargavi Vásquez, PT GP 1    41894620984 HC PT THER PROC EA 15 MIN 1/5/2023 Bhargavi Vásquez, PT GP 2          PT G-Codes  Outcome Measure Options: AM-PAC 6 Clicks Daily Activity (OT)  AM-PAC 6 Clicks Score (PT): 14  AM-PAC 6 Clicks Score (OT): 19    Bhargavi Vásquez PT  1/5/2023

## 2023-01-05 NOTE — PROGRESS NOTES
Pt admitted to swing bed on 10/5 for additional therapy in the setting of generalized weakness/debility. Pt not seen or examined today. I have reviewed her VS. I have discussed her care with SONAM Forrester who does not report any acute events or issues that need to be addressed. BP stable today. Cont current antihypertensive regimen. Cont Eliquis for treatment of DVT. Cont PT/OT. Swing bed coordinator submitted updated clinicals on 1/3. Cont supportive treatment.       Mustapha Loredo DO  01/05/23  15:52 EDT

## 2023-01-05 NOTE — PLAN OF CARE
Goal Outcome Evaluation:  Plan of Care Reviewed With: patient        Progress: improving  Outcome Evaluation: Resting comfortably.  Up in the chair several hours.  Tolerates well.

## 2023-01-06 PROCEDURE — 97116 GAIT TRAINING THERAPY: CPT

## 2023-01-06 PROCEDURE — 97110 THERAPEUTIC EXERCISES: CPT

## 2023-01-06 PROCEDURE — 97530 THERAPEUTIC ACTIVITIES: CPT

## 2023-01-06 PROCEDURE — 97164 PT RE-EVAL EST PLAN CARE: CPT

## 2023-01-06 RX ADMIN — APIXABAN 5 MG: 5 TABLET, FILM COATED ORAL at 08:04

## 2023-01-06 RX ADMIN — Medication 10 MG: at 20:45

## 2023-01-06 RX ADMIN — DICLOFENAC 4 G: 10 GEL TOPICAL at 12:00

## 2023-01-06 RX ADMIN — DOCUSATE SODIUM 100 MG: 100 CAPSULE ORAL at 20:45

## 2023-01-06 RX ADMIN — DICLOFENAC 4 G: 10 GEL TOPICAL at 17:18

## 2023-01-06 RX ADMIN — PANTOPRAZOLE SODIUM 40 MG: 40 TABLET, DELAYED RELEASE ORAL at 05:17

## 2023-01-06 RX ADMIN — FUROSEMIDE 40 MG: 40 TABLET ORAL at 08:04

## 2023-01-06 RX ADMIN — METOPROLOL TARTRATE 25 MG: 25 TABLET, FILM COATED ORAL at 20:45

## 2023-01-06 RX ADMIN — DICLOFENAC 4 G: 10 GEL TOPICAL at 20:45

## 2023-01-06 RX ADMIN — MAGNESIUM GLUCONATE 500 MG ORAL TABLET 400 MG: 500 TABLET ORAL at 08:04

## 2023-01-06 RX ADMIN — METOPROLOL TARTRATE 25 MG: 25 TABLET, FILM COATED ORAL at 08:04

## 2023-01-06 RX ADMIN — ISOSORBIDE MONONITRATE 30 MG: 30 TABLET, EXTENDED RELEASE ORAL at 08:07

## 2023-01-06 RX ADMIN — APIXABAN 5 MG: 5 TABLET, FILM COATED ORAL at 20:45

## 2023-01-06 RX ADMIN — FUROSEMIDE 40 MG: 40 TABLET ORAL at 17:49

## 2023-01-06 RX ADMIN — ESCITALOPRAM 10 MG: 10 TABLET, FILM COATED ORAL at 08:05

## 2023-01-06 RX ADMIN — DOCUSATE SODIUM 100 MG: 100 CAPSULE ORAL at 08:04

## 2023-01-06 RX ADMIN — GABAPENTIN 300 MG: 300 CAPSULE ORAL at 20:45

## 2023-01-06 RX ADMIN — TAMSULOSIN HYDROCHLORIDE 0.4 MG: 0.4 CAPSULE ORAL at 08:05

## 2023-01-06 RX ADMIN — Medication 1 TABLET: at 08:04

## 2023-01-06 RX ADMIN — DICLOFENAC 4 G: 10 GEL TOPICAL at 09:05

## 2023-01-06 NOTE — PROGRESS NOTES
Pt admitted to swing bed on 10/5 for additional therapy in the setting of generalized weakness/debility. Pt not seen or examined today. I have reviewed her VS. I have discussed her care with SONAM Gagnon who does not report any acute events or issues that need to be addressed. BP stable today. Cont current antihypertensive regimen. Cont Eliquis for treatment of DVT. Cont PT/OT. Pt has been approved for additional swing bed days through 1/10/23 with updated clinicals due on 1/9. Cont supportive treatment.       Mustapha Loredo,   01/06/23  15:52 EDT

## 2023-01-06 NOTE — THERAPY RE-EVALUATION
Acute Care - Physical Therapy Re-Evaluation   [place]     Patient Name: Tre Kinney  : 1932  MRN: [id number]  Today's Date: 2023   Onset of Illness/Injury or Date of Surgery: 10/06/22  Visit Dx:   No diagnosis found.  Patient Active Problem List   Diagnosis   • Acid reflux   • Ankle arthralgia   • Cardiac conduction disorder   • Closed fracture of distal fibula   • Arteriosclerosis of coronary artery   • Fracture of distal end of tibia   • Elevated cholesterol   • BP (high blood pressure)   • Hypertrophic polyarthritis   • OP (osteoporosis)   • Right heart failure (HCC)   • Heart failure, chronic, right-sided (HCC)   • SOB (shortness of breath)   • Palpitations   • CHF (congestive heart failure) (HCC)   • Congestive heart failure (HCC)   • Precordial pain   • Chronic anticoagulation   • Hypokalemia   • Anemia, unspecified type   • Generalized weakness     Past Medical History:   Diagnosis Date   • Anemia    • Arthritis    • CHF (congestive heart failure) (HCC)    • Chronic kidney disease    • Coronary artery disease    • H/O blood clots    • History of transfusion     no reaction    • Hypertension      Past Surgical History:   Procedure Laterality Date   • ABDOMINAL SURGERY     • APPENDECTOMY     • CARDIAC SURGERY     •  SECTION     • COLONOSCOPY N/A 3/7/2018    Procedure: COLONOSCOPY;  Surgeon: Willie Gresham MD;  Location:  [facility];  Service:    • CORONARY ANGIOPLASTY WITH STENT PLACEMENT     • ENDOSCOPY N/A 3/6/2018    Procedure: ESOPHAGOGASTRODUODENOSCOPY;  Surgeon: Willie Gresham MD;  Location:  [facility];  Service:    • PACEMAKER IMPLANTATION      left side    • SKIN BIOPSY     • TUBAL ABDOMINAL LIGATION       PT Assessment (last 12 hours)     PT Evaluation and Treatment     Row Name 23 1056          Physical Therapy Time and Intention    Document Type re-evaluation;therapy note (daily note)  -CT     Mode of Treatment physical therapy;individual therapy  -CT      Patient Effort good  -CT     Comment Pt treatment session [id number]. Pts son Willian on facetime during treatment. Pt performed sit to stand multiple times for dressing. Pt ambulated x 2 and performed seated and standing ther ex. Pt has met 2/3 goals and partially met 1/3.  -CT     Row Name 01/06/23 1056          General Information    Patient Profile Reviewed yes  -CT     Row Name 01/06/23 1056          Living Environment    Current Living Arrangements home  -CT     Row Name 01/06/23 1056          Cognition    Affect/Mental Status (Cognition) WFL  -CT     Orientation Status (Cognition) oriented x 4  -CT     Follows Commands (Cognition) WFL  -CT     Row Name 01/06/23 1056          Range of Motion Comprehensive    Comment, General Range of Motion BLE grossly [facility]  -CT     Row Name 01/06/23 1056          Bed Mobility    Comment, (Bed Mobility) not tested, pt up in chair  -CT     Row Name 01/06/23 1056          Transfers    Transfers sit-stand transfer;stand-sit transfer  -CT     Row Name 01/06/23 1056          Bed-Chair Transfer    Bed-Chair [place] (Transfers) minimum assist (75% patient effort)  -CT     Assistive Device (Bed-Chair Transfers) walker, front-wheeled  -CT     Row Name 01/06/23 1056          Chair-Bed Transfer    Chair-Bed [place] (Transfers) minimum assist (75% patient effort)  -CT     Assistive Device (Chair-Bed Transfers) walker, front-wheeled  -CT     Row Name 01/06/23 1056          Sit-Stand Transfer    Sit-Stand [place] (Transfers) minimum assist (75% patient effort)  -CT     Assistive Device (Sit-Stand Transfers) walker, front-wheeled  -CT     Row Name 01/06/23 1056          Stand-Sit Transfer    Stand-Sit [place] (Transfers) minimum assist (75% patient effort)  -CT     Assistive Device (Stand-Sit Transfers) walker, front-wheeled  -CT     Row Name 01/06/23 1056          Gait/Stairs (Locomotion)    [place] Level (Gait) minimum assist (75% patient effort);1 person assist;verbal  cues;nonverbal cues (demo/gesture)  -CT     Assistive Device (Gait) walker, front-wheeled  -CT     Distance in Feet (Gait) 10 x 2  -CT     Pattern (Gait) step-to  -CT     Deviations/Abnormal Patterns (Gait) stride length decreased;gait speed decreased;festinating/shuffling;weight shifting decreased  -CT     Bilateral Gait Deviations weight shift ability decreased;forward flexed posture  -CT     Row Name 01/06/23 1056          Balance    Static Sitting Balance supervision  -CT     Dynamic Sitting Balance contact guard  -CT     Position, Sitting Balance sitting in chair  -CT     Static Standing Balance minimal assist  -CT     Position/Device Used, Standing Balance walker, front-wheeled  -CT     Row Name 01/06/23 1056          Motor Skills    Therapeutic Exercise --  sit to stand x 6; seated ther ex: seated marches, LAQ, AP, hip abd/add, glut sets 25 reps; standing ther ex: standing marches 15 reps  -CT     Row Name 01/06/23 1056          Coping    Observed Emotional State calm;cooperative;pleasant  -CT     Verbalized Emotional State acceptance  -CT     Row Name 01/06/23 1056          Plan of Care Review    Plan of Care Reviewed With patient  -CT     Row Name 01/06/23 1056          Positioning and Restraints    Pre-Treatment Position sitting in chair/recliner  -CT     Post Treatment Position chair  -CT     In Chair sitting;call light within reach;encouraged to call for assist  -CT     Row Name 01/06/23 1056          Therapy Assessment/Plan (PT)    Rehab Potential (PT) good, to achieve stated therapy goals  -CT     Criteria for Skilled Interventions Met (PT) yes  -CT     Therapy Frequency (PT) 5 times/wk  -CT     Predicted Duration of Therapy Intervention (PT) length of stay  -CT     Row Name 01/06/23 1056          Progress Summary (PT)    Progress Toward Functional Goals (PT) progress toward functional goals is good;prepare for discharge  -CT     Row Name 01/06/23 1056          Physical Therapy Goals    Bed Mobility  Goal Selection (PT) bed mobility, PT goal 1  -CT     Transfer Goal Selection (PT) transfer, PT goal 1  -CT     Gait Training Goal Selection (PT) gait training, PT goal 1  -CT     Row Name 01/06/23 1057 01/06/23 1056       Bed Mobility Goal 1 (PT)    Activity/Assistive Device (Bed Mobility Goal 1, PT) bed mobility activities, all  -CT bed mobility activities, all  -CT    [place] Level/Cues Needed (Bed Mobility Goal 1, PT) contact guard required  -CT minimum assist (75% or more patient effort)  -CT    Time Frame (Bed Mobility Goal 1, PT) by discharge  -CT by discharge  -CT    Progress/Outcomes (Bed Mobility Goal 1, PT) -- goal met  -CT    Row Name 01/06/23 1057 01/06/23 1056       Transfer Goal 1 (PT)    Activity/Assistive Device (Transfer Goal 1, PT) sit-to-stand/stand-to-sit;bed-to-chair/chair-to-bed  -CT sit-to-stand/stand-to-sit;bed-to-chair/chair-to-bed  -CT    [place] Level/Cues Needed (Transfer Goal 1, PT) contact guard required  -CT minimum assist (75% or more patient effort)  -CT    Time Frame (Transfer Goal 1, PT) by discharge  -CT by discharge  -CT    Progress/Outcome (Transfer Goal 1, PT) -- goal met  -CT    Row Name 01/06/23 1057 01/06/23 1056       Gait Training Goal 1 (PT)    Activity/Assistive Device (Gait Training Goal 1, PT) gait (walking locomotion);assistive device use  -CT gait (walking locomotion);assistive device use  -CT    [place] Level (Gait Training Goal 1, PT) contact guard required  -CT minimum assist (75% or more patient effort)  -CT    Distance (Gait Training Goal 1, PT) 30  -CT 30  -CT    Time Frame (Gait Training Goal 1, PT) by discharge  -CT by discharge  -CT    Progress/Outcome (Gait Training Goal 1, PT) -- goal partially met  distance not met  -CT          User Key  (r) = Recorded By, (t) = Taken By, (c) = Cosigned By    Initials Name Provider Type    CT Bhargavi Vásquez PT Physical Therapist                Physical Therapy Education     Title: PT OT SLP Therapies  (Done)     Topic: Physical Therapy (Done)     Point: Mobility training (Done)     Learning Progress Summary           Patient Acceptance, E,TB, VU by KK at 1/6/2023 1225    Acceptance, E, VU by CL at 1/5/2023 1414    Acceptance, E,TB, DU,VU by BD at 1/3/2023 0838    Acceptance, TB,E, VU,DU by BD at 1/2/2023 0907    Acceptance, TB,E, VU,DU by BD at 1/1/2023 0846    Acceptance, E,TB, VU by RM at 12/30/2022 1035    Acceptance, E,TB, VU by AC at 12/28/2022 1505    Acceptance, E,TB, VU by AC at 12/27/2022 1513    Acceptance, E, VU by CL at 12/26/2022 1325    Acceptance, E, VU by CL at 12/22/2022 1826    Acceptance, E, VU by CL at 12/21/2022 1303    Acceptance, E, VU by EB at 12/20/2022 1644    Acceptance, E, VU by CL at 12/17/2022 1703    Acceptance, E,D, VU,NR by LL at 12/16/2022 1605    Acceptance, E,TB, VU by AC at 12/16/2022 1523    Acceptance, E,TB, VU by AC at 12/15/2022 1618    Acceptance, E,TB, VU by AC at 12/14/2022 1503    Acceptance, E,TB, VU by DL at 12/13/2022 0240    Acceptance, D,E, VU,NR by LL at 12/12/2022 1455    Acceptance, E,TB, VU by RW at 12/9/2022 1451    Acceptance, E,TB, VU by BC at 11/27/2022 1319    Acceptance, E,TB, VU by RM at 11/20/2022 1449    Acceptance, E,TB, VU by RM at 11/18/2022 1021    Acceptance, E,TB, VU by RH at 11/16/2022 1110    Acceptance, E,TB, VU by KK at 11/14/2022 1638    Acceptance, E,TB, VU by KK at 11/13/2022 1648    Acceptance, E,TB, VU by KG at 11/10/2022 1827    Acceptance, E,TB, VU by DM at 11/9/2022 1638    Acceptance, E,TB, VU by KK at 11/8/2022 1754    Acceptance, E,TB, VU by KK at 11/7/2022 1635    Acceptance, E,D, VU,NR by LL at 11/5/2022 1325    Acceptance, E, VU by EB at 11/4/2022 1448    Acceptance, E,TB, VU by KM at 11/4/2022 1101   Family Acceptance, D,E, VU,NR by LL at 12/12/2022 1455                   Point: Home exercise program (Done)     Learning Progress Summary           Patient Acceptance, E,TB, VU by KK at 1/6/2023 1225    Acceptance, E, VU by CL at  1/5/2023 1414    Acceptance, E,TB, DU,VU by BD at 1/3/2023 0838    Acceptance, TB,E, VU,DU by BD at 1/2/2023 0907    Acceptance, TB,E, VU,DU by BD at 1/1/2023 0846    Acceptance, E,TB, VU by RM at 12/30/2022 1035    Acceptance, E,TB, VU by AC at 12/28/2022 1505    Acceptance, E,TB, VU by AC at 12/27/2022 1513    Acceptance, E, VU by CL at 12/26/2022 1325    Acceptance, E, VU by CL at 12/22/2022 1826    Acceptance, E, VU by CL at 12/21/2022 1303    Acceptance, E, VU by EB at 12/20/2022 1644    Acceptance, E, VU by CL at 12/17/2022 1703    Acceptance, E,D, VU,NR by LL at 12/16/2022 1605    Acceptance, E,TB, VU by AC at 12/16/2022 1523    Acceptance, E,TB, VU by AC at 12/15/2022 1618    Acceptance, E,TB, VU by AC at 12/14/2022 1503    Acceptance, E,TB, VU by DL at 12/13/2022 0240    Acceptance, D,E, VU,NR by LL at 12/12/2022 1455    Acceptance, E,TB, VU by RW at 12/9/2022 1451    Acceptance, E,TB, VU by BC at 11/27/2022 1319    Acceptance, E,TB, VU by RM at 11/20/2022 1449    Acceptance, E,TB, VU by RM at 11/18/2022 1021    Acceptance, E,TB, VU by RH at 11/16/2022 1110    Acceptance, E,TB, VU by KK at 11/14/2022 1638    Acceptance, E,TB, VU by KK at 11/13/2022 1648    Acceptance, E,TB, VU by KG at 11/10/2022 1827    Acceptance, E,TB, VU by DM at 11/9/2022 1638    Acceptance, E,TB, VU by KK at 11/8/2022 1754    Acceptance, E,TB, VU by KK at 11/7/2022 1635    Acceptance, E,D, VU,NR by LL at 11/5/2022 1325    Acceptance, E, VU by EB at 11/4/2022 1448    Acceptance, E,TB, VU by KM at 11/4/2022 1101   Family Acceptance, D,E, VU,NR by LL at 12/12/2022 1455                   Point: Body mechanics (Done)     Learning Progress Summary           Patient Acceptance, E,TB, VU by KK at 1/6/2023 1225    Acceptance, E, VU by CL at 1/5/2023 1414    Acceptance, E,TB, DU,VU by BD at 1/3/2023 0838    Acceptance, TB,E, VU,DU by BD at 1/2/2023 0907    Acceptance, TB,E, VU,DU by BD at 1/1/2023 0846    Acceptance, E,TB, VU by RM at  12/30/2022 1035    Acceptance, E,TB, VU by AC at 12/28/2022 1505    Acceptance, E,TB, VU by AC at 12/27/2022 1513    Acceptance, E, VU by CL at 12/26/2022 1325    Acceptance, E, VU by CL at 12/22/2022 1826    Acceptance, E, VU by CL at 12/21/2022 1303    Acceptance, E, VU by EB at 12/20/2022 1644    Acceptance, E, VU by CL at 12/17/2022 1703    Acceptance, E,D, VU,NR by LL at 12/16/2022 1605    Acceptance, E,TB, VU by AC at 12/16/2022 1523    Acceptance, E,TB, VU by AC at 12/15/2022 1618    Acceptance, E,TB, VU by AC at 12/14/2022 1503    Acceptance, E,TB, VU by DL at 12/13/2022 0240    Acceptance, D,E, VU,NR by LL at 12/12/2022 1455    Acceptance, E,TB, VU by RW at 12/9/2022 1451    Acceptance, E,TB, VU by BC at 11/27/2022 1319    Acceptance, E,TB, VU by RM at 11/20/2022 1449    Acceptance, E,TB, VU by RM at 11/18/2022 1021    Acceptance, E,TB, VU by RH at 11/16/2022 1110    Acceptance, E,TB, VU by KK at 11/14/2022 1638    Acceptance, E,TB, VU by KK at 11/13/2022 1648    Acceptance, E,TB, VU by KG at 11/10/2022 1827    Acceptance, E,TB, VU by DM at 11/9/2022 1638    Acceptance, E,TB, VU by KK at 11/8/2022 1754    Acceptance, E,TB, VU by KK at 11/7/2022 1635    Acceptance, E,D, VU,NR by LL at 11/5/2022 1325    Acceptance, E, VU by EB at 11/4/2022 1448    Acceptance, E,TB, VU by KM at 11/4/2022 1101   Family Acceptance, D,E, VU,NR by LL at 12/12/2022 1455                   Point: Precautions (Done)     Learning Progress Summary           Patient Acceptance, E,TB, VU by KK at 1/6/2023 1225    Acceptance, E, VU by CL at 1/5/2023 1414    Acceptance, E,TB, DU,VU by BD at 1/3/2023 0838    Acceptance, TB,E, VU,DU by BD at 1/2/2023 0907    Acceptance, TB,E, VU,DU by BD at 1/1/2023 0846    Acceptance, E,TB, VU by RM at 12/30/2022 1035    Acceptance, E,TB, VU by AC at 12/28/2022 1505    Acceptance, E,TB, VU by AC at 12/27/2022 1513    Acceptance, E, VU by CL at 12/26/2022 1325    Acceptance, E, VU by CL at 12/22/2022 1826     Acceptance, E, VU by CL at 12/21/2022 1303    Acceptance, E, VU by EB at 12/20/2022 1644    Acceptance, E, VU by CL at 12/17/2022 1703    Acceptance, E,D, VU,NR by LL at 12/16/2022 1605    Acceptance, E,TB, VU by AC at 12/16/2022 1523    Acceptance, E,TB, VU by AC at 12/15/2022 1618    Acceptance, E,TB, VU by AC at 12/14/2022 1503    Acceptance, E,TB, VU by DL at 12/13/2022 0240    Acceptance, D,E, VU,NR by LL at 12/12/2022 1455    Acceptance, E,TB, VU by RW at 12/9/2022 1451    Acceptance, E,TB, VU by BC at 11/27/2022 1319    Acceptance, E,TB, VU by RM at 11/20/2022 1449    Acceptance, E,TB, VU by RM at 11/18/2022 1021    Acceptance, E,TB, VU by RH at 11/16/2022 1110    Acceptance, E,TB, VU by KK at 11/14/2022 1638    Acceptance, E,TB, VU by KK at 11/13/2022 1648    Acceptance, E,TB, VU by KG at 11/10/2022 1827    Acceptance, E,TB, VU by DM at 11/9/2022 1638    Acceptance, E,TB, VU by KK at 11/8/2022 1754    Acceptance, E,TB, VU by KK at 11/7/2022 1635    Acceptance, E,D, VU,NR by LL at 11/5/2022 1325    Acceptance, E, VU by EB at 11/4/2022 1448    Acceptance, E,TB, VU by KM at 11/4/2022 1101   Family Acceptance, D,E, VU,NR by LL at 12/12/2022 1455                               User Key     Initials Effective Dates Name Provider Type Discipline    CL 06/16/21 -  Mitzy Mustafa, RN Registered Nurse Nurse    LL 05/02/16 -  Neda Garcia PTA Physical Therapist Assistant PT    RM 06/14/22 -  Bethany Krause RN Registered Nurse Nurse    DM 06/16/21 -  Moniz, Deborah, RN Registered Nurse Nurse    BC 08/02/22 -  Collett, Brittany, RN Registered Nurse Nurse    AC 10/13/22 -  Jaya Castillo, RN Registered Nurse Nurse    BD 08/05/21 -  Radha Lynn, RN Registered Nurse Nurse    KM 05/24/22 -  Mao Vázquez, PT Physical Therapist PT    EB 09/22/22 -  Cedric Oliver, RN Registered Nurse Nurse    KK 06/27/22 -  Chelsi Bolivar, RN Registered Nurse Nurse    DL 03/16/22 -  Francy Garcia, RN Registered Nurse Nurse    KG  09/22/22 -  Verónica Rodriguez, RN Registered Nurse Nurse    RW 09/22/22 -  Bethany Schofield, RN Registered Nurse Nurse    RH 08/03/22 - 12/21/22 Jung Russell RN [facility] Registered Nurse Nurse              PT Recommendation and Plan  Anticipated Discharge Disposition (PT): home with 24/7 [facility], skilled nursing facility  Planned Therapy Interventions (PT): balance training, bed mobility training, gait training, home exercise program, manual therapy techniques, motor coordination training, neuromuscular re-education, patient/family education, postural re-education, strengthening, transfer training  Therapy Frequency (PT): 5 times/wk  Progress Summary (PT)  Progress Toward Functional Goals (PT): progress toward functional goals is good, prepare for discharge  Plan of Care Reviewed With: patient  Progress: improving   Outcome Measures     Row Name 01/05/23 1408 01/04/23 1500 01/03/23 1453       How much help from another is currently needed...    Putting on and taking off regular lower body clothing? 3  -LM 3  -LM 3  -KP    Bathing (including washing, rinsing, and drying) 3  -LM 3  -LM 3  -KP    Toileting (which includes using toilet bed pan or urinal) 3  -LM 3  -LM 3  -KP    Putting on and taking off regular upper body clothing 3  -LM 3  -LM 3  -KP    Taking care of personal grooming (such as brushing teeth) 3  -LM 3  -LM 3  -KP    Eating meals 4  -LM 4  -LM 4  -KP    AM-PAC 6 Clicks Score (OT) 19  -LM 19  -LM 19  -KP       Functional Assessment    Outcome Measure Options AM-PAC 6 Clicks Daily Activity (OT)  -LM AM-PAC 6 Clicks Daily Activity (OT)  -LM --          User Key  (r) = Recorded By, (t) = Taken By, (c) = Cosigned By    Initials Name Provider Type    LM Татьяна Bah OT Occupational Therapist    Linn Richardson OT Occupational Therapist                 Time Calculation:    PT Charges     Row Name 01/06/23 1351             Time Calculation    Start Time 1056  -CT      Stop Time 1140  -CT      Time  Calculation (min) 44 min  -CT      PT Received On 01/06/23  -CT      PT Goal Re-Cert Due Date 01/20/23  -CT            User Key  (r) = Recorded By, (t) = Taken By, (c) = Cosigned By    Initials Name Provider Type    CT Bhargavi Vásquez, PT Physical Therapist              Therapy Charges for Today     Code Description Service Date Service Provider Modifiers Qty    38958716057 HC GAIT TRAINING EA 15 MIN 1/5/2023 Bhargavi Vásquez, PT GP 1    83867576058 HC PT THER PROC EA 15 MIN 1/5/2023 Bhargavi Vásquez, PT GP 2    33331936402 HC GAIT TRAINING EA 15 MIN 1/6/2023 Bhargavi Vásquez, PT GP 1    08302235297 HC PT THERAPEUTIC ACT EA 15 MIN 1/6/2023 Bhargavi Vásquez, PT GP 1    75964065168 HC PT THER PROC EA 15 MIN 1/6/2023 Bhargavi Vásquez, PT GP 1    [id number]  PT RE-EVAL ESTABLISHED PLAN 2 1/6/2023 Bhargavi Vásquez, PT GP 1          PT G-Codes  Outcome Measure Options: AM-PAC 6 Clicks Daily Activity (OT)  AM-PAC 6 Clicks Score (PT): 13  AM-PAC 6 Clicks Score (OT): 19    Bhargavi Vásquez PT  1/6/2023

## 2023-01-06 NOTE — THERAPY TREATMENT NOTE
Acute Care - Occupational Therapy Treatment Note   [place]     Patient Name: Tre Kinney  : 1932  MRN: [id number]  Today's Date: 2023  Onset of Illness/Injury or Date of Surgery: 10/06/22     Referring Physician: Janneth    Admit Date: 10/5/2022     No diagnosis found.  Patient Active Problem List   Diagnosis   • Acid reflux   • Ankle arthralgia   • Cardiac conduction disorder   • Closed fracture of distal fibula   • Arteriosclerosis of coronary artery   • Fracture of distal end of tibia   • Elevated cholesterol   • BP (high blood pressure)   • Hypertrophic polyarthritis   • OP (osteoporosis)   • Right heart failure (HCC)   • Heart failure, chronic, right-sided (HCC)   • SOB (shortness of breath)   • Palpitations   • CHF (congestive heart failure) (HCC)   • Congestive heart failure (HCC)   • Precordial pain   • Chronic anticoagulation   • Hypokalemia   • Anemia, unspecified type   • Generalized weakness     Past Medical History:   Diagnosis Date   • Anemia    • Arthritis    • CHF (congestive heart failure) ([facility])    • Chronic kidney disease    • Coronary artery disease    • H/O blood clots    • History of transfusion     no reaction    • Hypertension      Past Surgical History:   Procedure Laterality Date   • ABDOMINAL SURGERY     • APPENDECTOMY     • CARDIAC SURGERY     •  SECTION     • COLONOSCOPY N/A 3/7/2018    Procedure: COLONOSCOPY;  Surgeon: Willie Gresham MD;  Location:  [facility];  Service:    • CORONARY ANGIOPLASTY WITH STENT PLACEMENT     • ENDOSCOPY N/A 3/6/2018    Procedure: ESOPHAGOGASTRODUODENOSCOPY;  Surgeon: Willie Gresham MD;  Location:  [facility];  Service:    • PACEMAKER IMPLANTATION      left side    • SKIN BIOPSY     • TUBAL ABDOMINAL LIGATION           OT ASSESSMENT FLOWSHEET (last 12 hours)     OT Evaluation and Treatment     Row Name 23 1458                   OT Time and Intention    Subjective Information no complaints  -LM        Document Type  therapy note (daily note)  -LM        Mode of Treatment occupational therapy  -LM        Patient Effort good  -LM        Comment Patient seen for light bue arom/therex.  Patient at F+ activity tolerance with bue therex and badl performance.  Patient completed bue marielena therex and yellow tband therex while seated.  -LM           General Information    Existing Precautions/Restrictions fall  -LM           Cognition    Affect/Mental Status (Cognition) WFL  -LM        Orientation Status (Cognition) oriented x 4  -LM           Positioning and Restraints    Post Treatment Position chair  -LM        In Chair call light within reach;encouraged to call for assist  -LM              User Key  (r) = Recorded By, (t) = Taken By, (c) = Cosigned By    Initials Name Effective Dates    LM Татьяна Bah, OT 06/16/21 -                        OT Recommendation and Plan           Outcome Measures     Row Name 01/06/23 1500 01/05/23 1408 01/04/23 1500       How much help from another is currently needed...    Putting on and taking off regular lower body clothing? 3  -LM 3  -LM 3  -LM    Bathing (including washing, rinsing, and drying) 3  -LM 3  -LM 3  -LM    Toileting (which includes using toilet bed pan or urinal) 3  -LM 3  -LM 3  -LM    Putting on and taking off regular upper body clothing 3  -LM 3  -LM 3  -LM    Taking care of personal grooming (such as brushing teeth) 3  -LM 3  -LM 3  -LM    Eating meals 4  -LM 4  -LM 4  -LM    AM-PAC 6 Clicks Score (OT) 19  -LM 19  -LM 19  -LM       Functional Assessment    Outcome Measure Options AM-PAC 6 Clicks Daily Activity (OT)  -LM AM-PAC 6 Clicks Daily Activity (OT)  -LM AM-PAC 6 Clicks Daily Activity (OT)  -LM          User Key  (r) = Recorded By, (t) = Taken By, (c) = Cosigned By    Initials Name Provider Type    LM Татьяна Bah, OT Occupational Therapist                Time Calculation:    Time Calculation- OT     Row Name 01/06/23 1505             Time Calculation- OT    OT Start Time  1430  -LM      OT Stop Time 1445  -LM      OT Time Calculation (min) 15 min  -LM      Total Timed Code Minutes- OT 15 minute(s)  -LM            User Key  (r) = Recorded By, (t) = Taken By, (c) = Cosigned By    Initials Name Provider Type    LM Татьяна Bah, OT Occupational Therapist              Therapy Charges for Today     Code Description Service Date Service Provider Modifiers Qty    80621099667 HC OT SELF CARE/MGMT/TRAIN EA 15 MIN 1/5/2023 Татьяна Bah OT GO 1    76385078822 HC OT THER PROC EA 15 MIN 1/5/2023 Татьяна Bah OT GO 1    69411880385 HC OT THER PROC EA 15 MIN 1/6/2023 Татьяна Bah OT GO 1               Татьяна Bah OT  1/6/2023

## 2023-01-06 NOTE — CASE MANAGEMENT/SOCIAL WORK
Discharge Planning Assessment   [place]     Patient Name: Tre Kinney  MRN: [id number]  Today's Date: 1/6/2023    Admit Date: 10/5/2022    Plan: Pt was admitted to swing bed on 10/5/22.  was notified that Pt was approved for additional swing bed days through 1/10/23 with clinical updates due 1/9/23. SS to continue to follow and assist as needed.     Discharge Plan     Row Name 01/06/23 1356       Plan    Plan Pt was admitted to swing bed on 10/5/22.  was notified that Pt was approved for additional swing bed days through 1/10/23 with clinical updates due 1/9/23. SS to continue to follow and assist as needed.              XANDER Craig

## 2023-01-06 NOTE — PLAN OF CARE
Goal Outcome Evaluation:  Plan of Care Reviewed With: patient        Progress: improving   Pt up in chair until bedtime. No acute changes, no complaints. Will follow current POC.

## 2023-01-06 NOTE — CASE MANAGEMENT/SOCIAL WORK
Patient approved for additional swing bed days through 1/10/23 with clinical updates due 1/9/23. Provider and SS notified via secure chat.  Updated patients padmaja Perdomo related to additional approved days.

## 2023-01-06 NOTE — PLAN OF CARE
Goal Outcome Evaluation:  Plan of Care Reviewed With: patient           Outcome Evaluation: Patient is up in chair, no complaints or acute changes at this time

## 2023-01-07 PROCEDURE — 97110 THERAPEUTIC EXERCISES: CPT

## 2023-01-07 PROCEDURE — 97116 GAIT TRAINING THERAPY: CPT

## 2023-01-07 PROCEDURE — 97530 THERAPEUTIC ACTIVITIES: CPT

## 2023-01-07 RX ADMIN — DICLOFENAC 4 G: 10 GEL TOPICAL at 17:29

## 2023-01-07 RX ADMIN — Medication 10 MG: at 21:20

## 2023-01-07 RX ADMIN — GABAPENTIN 300 MG: 300 CAPSULE ORAL at 21:20

## 2023-01-07 RX ADMIN — APIXABAN 5 MG: 5 TABLET, FILM COATED ORAL at 21:21

## 2023-01-07 RX ADMIN — METOPROLOL TARTRATE 25 MG: 25 TABLET, FILM COATED ORAL at 08:44

## 2023-01-07 RX ADMIN — ISOSORBIDE MONONITRATE 30 MG: 30 TABLET, EXTENDED RELEASE ORAL at 08:44

## 2023-01-07 RX ADMIN — DOCUSATE SODIUM 100 MG: 100 CAPSULE ORAL at 21:21

## 2023-01-07 RX ADMIN — MAGNESIUM GLUCONATE 500 MG ORAL TABLET 400 MG: 500 TABLET ORAL at 08:45

## 2023-01-07 RX ADMIN — FUROSEMIDE 40 MG: 40 TABLET ORAL at 17:29

## 2023-01-07 RX ADMIN — DOCUSATE SODIUM 100 MG: 100 CAPSULE ORAL at 08:44

## 2023-01-07 RX ADMIN — PANTOPRAZOLE SODIUM 40 MG: 40 TABLET, DELAYED RELEASE ORAL at 06:28

## 2023-01-07 RX ADMIN — FUROSEMIDE 40 MG: 40 TABLET ORAL at 08:45

## 2023-01-07 RX ADMIN — ESCITALOPRAM 10 MG: 10 TABLET, FILM COATED ORAL at 08:44

## 2023-01-07 RX ADMIN — METOPROLOL TARTRATE 25 MG: 25 TABLET, FILM COATED ORAL at 21:20

## 2023-01-07 RX ADMIN — TAMSULOSIN HYDROCHLORIDE 0.4 MG: 0.4 CAPSULE ORAL at 08:45

## 2023-01-07 RX ADMIN — DICLOFENAC 4 G: 10 GEL TOPICAL at 08:42

## 2023-01-07 RX ADMIN — DICLOFENAC 4 G: 10 GEL TOPICAL at 21:20

## 2023-01-07 RX ADMIN — Medication 1 TABLET: at 08:45

## 2023-01-07 RX ADMIN — APIXABAN 5 MG: 5 TABLET, FILM COATED ORAL at 08:44

## 2023-01-07 NOTE — PLAN OF CARE
Goal Outcome Evaluation:  Plan of Care Reviewed With: patient        Progress: improving  States she is feeling stronger.  Sat up in chair at bedside most of day.  No complaints at this time.  Worked with OT.  Continue plan of care.

## 2023-01-07 NOTE — PLAN OF CARE
Goal Outcome Evaluation:         The patient is resting quietly in bed this morning. No acute distress or events overnight. No complaints of chest pain overnight. The patient did sit up in the chair for 2.5 hours the beginning of the shift. She ambulated in the room a few times this shift up to [facility]. Swingbed charting completed. Will continue with the plan of care.

## 2023-01-07 NOTE — THERAPY TREATMENT NOTE
Acute Care - Occupational Therapy Treatment Note   [place]     Patient Name: Tre Kinney  : 1932  MRN: [id number]  Today's Date: 2023  Onset of Illness/Injury or Date of Surgery: 10/06/22     Referring Physician: Janneth    Admit Date: 10/5/2022     No diagnosis found.  Patient Active Problem List   Diagnosis   • Acid reflux   • Ankle arthralgia   • Cardiac conduction disorder   • Closed fracture of distal fibula   • Arteriosclerosis of coronary artery   • Fracture of distal end of tibia   • Elevated cholesterol   • BP (high blood pressure)   • Hypertrophic polyarthritis   • OP (osteoporosis)   • Right heart failure (HCC)   • Heart failure, chronic, right-sided (HCC)   • SOB (shortness of breath)   • Palpitations   • CHF (congestive heart failure) (HCC)   • Congestive heart failure (HCC)   • Precordial pain   • Chronic anticoagulation   • Hypokalemia   • Anemia, unspecified type   • Generalized weakness     Past Medical History:   Diagnosis Date   • Anemia    • Arthritis    • CHF (congestive heart failure) ([facility])    • Chronic kidney disease    • Coronary artery disease    • H/O blood clots    • History of transfusion     no reaction    • Hypertension      Past Surgical History:   Procedure Laterality Date   • ABDOMINAL SURGERY     • APPENDECTOMY     • CARDIAC SURGERY     •  SECTION     • COLONOSCOPY N/A 3/7/2018    Procedure: COLONOSCOPY;  Surgeon: Willie Gresham MD;  Location:  [facility];  Service:    • CORONARY ANGIOPLASTY WITH STENT PLACEMENT     • ENDOSCOPY N/A 3/6/2018    Procedure: ESOPHAGOGASTRODUODENOSCOPY;  Surgeon: Willie Gresham MD;  Location:  [facility];  Service:    • PACEMAKER IMPLANTATION      left side    • SKIN BIOPSY     • TUBAL ABDOMINAL LIGATION           OT ASSESSMENT FLOWSHEET (last 12 hours)     OT Evaluation and Treatment     Row Name 23 1444                   OT Time and Intention    Subjective Information no complaints  -HB        Document Type  therapy note (daily note)  -HB        Mode of Treatment individual therapy;occupational therapy  -HB        Total Minutes, Occupational Therapy 15  -HB        Patient Effort good  -HB           General Information    Patient Profile Reviewed yes  -HB        Patient/Family/Caregiver Comments/Observations Patient and RN okshantell OT this date.  -HB        General Observations of Patient Patient tolerated OT well with no adverse reactions.  -HB           Cognition    Affect/Mental Status (Cognition) WFL  -HB        Orientation Status (Cognition) oriented x 4  -HB        Follows Commands (Cognition) WFL  -HB           Self-Feeding Assessment/Training    Crockett Level (Feeding) feeding skills;liquids to mouth;finger foods;set up  -HB           Motor Skills    Motor Skills motor control/coordination interventions;functional endurance  -HB        Motor Control/Coordination Interventions therapeutic exercise/ROM  -HB        Therapeutic Exercise shoulder;elbow/forearm;wrist;hand  -HB        Additional Documentation --  flex bar; overhead press/chest press 2 sets 10x; rest between  -HB           Positioning and Restraints    Pre-Treatment Position sitting in chair/recliner  -HB        Post Treatment Position wheelchair  -HB        In Bed call light within reach;encouraged to call for assist  -HB              User Key  (r) = Recorded By, (t) = Taken By, (c) = Cosigned By    Initials Name Effective Dates    HB Katelynn Fernández, OT 05/25/21 -                        OT Recommendation and Plan           Outcome Measures     Row Name 01/07/23 1400 01/06/23 1500 01/05/23 1408       How much help from another is currently needed...    Putting on and taking off regular lower body clothing? 3  -HB 3  -LM 3  -LM    Bathing (including washing, rinsing, and drying) 3  -HB 3  -LM 3  -LM    Toileting (which includes using toilet bed pan or urinal) 3  -HB 3  -LM 3  -LM    Putting on and taking off regular upper body clothing 3  -HB 3  -LM 3  -LM     Taking care of personal grooming (such as brushing teeth) 3  -HB 3  -LM 3  -LM    Eating meals 4  -HB 4  -LM 4  -LM    AM-PAC 6 Clicks Score (OT) 19  -HB 19  -LM 19  -LM       Functional Assessment    Outcome Measure Options -- AM-PAC 6 Clicks Daily Activity (OT)  -LM AM-PAC 6 Clicks Daily Activity (OT)  -LM    Row Name 01/04/23 1500             How much help from another is currently needed...    Putting on and taking off regular lower body clothing? 3  -LM      Bathing (including washing, rinsing, and drying) 3  -LM      Toileting (which includes using toilet bed pan or urinal) 3  -LM      Putting on and taking off regular upper body clothing 3  -LM      Taking care of personal grooming (such as brushing teeth) 3  -LM      Eating meals 4  -LM      AM-PAC 6 Clicks Score (OT) 19  -LM         Functional Assessment    Outcome Measure Options AM-PAC 6 Clicks Daily Activity (OT)  -LM            User Key  (r) = Recorded By, (t) = Taken By, (c) = Cosigned By    Initials Name Provider Type    LM Татьяна Bah OT Occupational Therapist    HB Katelynn Fernández OT Occupational Therapist                Time Calculation:    Time Calculation- OT     Row Name 01/07/23 1447 01/07/23 1413          Time Calculation- OT    OT Start Time 1408  -HB --     OT Stop Time 1423  -HB --     OT Time Calculation (min) 15 min  -HB --     Total Timed Code Minutes- OT 15 minute(s)  -HB --        Timed Charges    74733 - Gait Training Minutes  -- 15  -CS        Total Minutes    Timed Charges Total Minutes -- 15  -CS      Total Minutes -- 15  -CS           User Key  (r) = Recorded By, (t) = Taken By, (c) = Cosigned By    Initials Name Provider Type    CS Carlos Duvall, PT Physical Therapist    HB Katelynn Fernández, OT Occupational Therapist              Therapy Charges for Today     Code Description Service Date Service Provider Modifiers Qty    [id number]  OT THERAPEUTIC ACT EA 15 MIN 1/7/2023 Katelynn Fernández OT GO 1               Katelynn Fernández  OT  1/7/2023

## 2023-01-07 NOTE — PROGRESS NOTES
Pt admitted to swing bed on 10/5 for additional therapy in the setting of generalized weakness/debility. Pt not seen or examined today. I have reviewed her VS. I have discussed her care with SONAM Tejeda who does not report any acute events or issues that need to be addressed. BP has fluctuated today. Cont current antihypertensive regimen. Cont Eliquis for treatment of DVT. Cont PT/OT. Pt has been approved for additional swing bed days through 1/10/23 with updated clinicals due on 1/9. Cont supportive treatment. Repeat labs in the AM.       Mustapha Loredo,   01/07/23  15:52 EDT

## 2023-01-07 NOTE — THERAPY TREATMENT NOTE
Acute Care - Physical Therapy Treatment Note   [place]     Patient Name: Tre Kinney  : 1932  MRN: [id number]  Today's Date: 2023   Onset of Illness/Injury or Date of Surgery: 10/06/22  Visit Dx:   No diagnosis found.  Patient Active Problem List   Diagnosis   • Acid reflux   • Ankle arthralgia   • Cardiac conduction disorder   • Closed fracture of distal fibula   • Arteriosclerosis of coronary artery   • Fracture of distal end of tibia   • Elevated cholesterol   • BP (high blood pressure)   • Hypertrophic polyarthritis   • OP (osteoporosis)   • Right heart failure (HCC)   • Heart failure, chronic, right-sided (HCC)   • SOB (shortness of breath)   • Palpitations   • CHF (congestive heart failure) (HCC)   • Congestive heart failure (HCC)   • Precordial pain   • Chronic anticoagulation   • Hypokalemia   • Anemia, unspecified type   • Generalized weakness     Past Medical History:   Diagnosis Date   • Anemia    • Arthritis    • CHF (congestive heart failure) (HCC)    • Chronic kidney disease    • Coronary artery disease    • H/O blood clots    • History of transfusion     no reaction    • Hypertension      Past Surgical History:   Procedure Laterality Date   • ABDOMINAL SURGERY     • APPENDECTOMY     • CARDIAC SURGERY     •  SECTION     • COLONOSCOPY N/A 3/7/2018    Procedure: COLONOSCOPY;  Surgeon: Willie Gresham MD;  Location:  [facility];  Service:    • CORONARY ANGIOPLASTY WITH STENT PLACEMENT     • ENDOSCOPY N/A 3/6/2018    Procedure: ESOPHAGOGASTRODUODENOSCOPY;  Surgeon: Willie Gresham MD;  Location:  [facility];  Service:    • PACEMAKER IMPLANTATION      left side    • SKIN BIOPSY     • TUBAL ABDOMINAL LIGATION       PT Assessment (last 12 hours)     PT Evaluation and Treatment     Row Name 23 1406          Physical Therapy Time and Intention    Subjective Information no complaints  -CS     Document Type therapy note (daily note)  -CS     Mode of Treatment physical  therapy;individual therapy  -CS     Patient Effort good  -CS     Comment Pt seen bedside this PM.  Pt agreed to therex and ambulation.  -CS     Row Name 01/07/23 1406          General Information    Patient Profile Reviewed yes  -CS     Row Name 01/07/23 1406          Cognition    Affect/Mental Status (Cognition) WFL  -CS     Orientation Status (Cognition) oriented x 4  -CS     Follows Commands (Cognition) WFL  -CS     Row Name 01/07/23 1406          Transfers    Transfers sit-stand transfer;stand-sit transfer  -CS     Comment, (Transfers) Pt came to standing to RW w/ supervision  -CS     Row Name 01/07/23 1406          Sit-Stand Transfer    Sit-Stand [place] (Transfers) standby assist;supervision  -CS     Assistive Device (Sit-Stand Transfers) walker, front-wheeled  -CS     Row Name 01/07/23 1406          Stand-Sit Transfer    Stand-Sit [place] (Transfers) verbal cues;standby assist  -CS     Assistive Device (Stand-Sit Transfers) walker, front-wheeled  -CS     Row Name 01/07/23 1406          Gait/Stairs (Locomotion)    [place] Level (Gait) standby assist  -CS     Assistive Device (Gait) walker, front-wheeled  -CS     Distance in Feet (Gait) 25'  -CS     Pattern (Gait) step-to  -CS     Deviations/Abnormal Patterns (Gait) stride length decreased;gait speed decreased;festinating/shuffling;weight shifting decreased  -CS     Bilateral Gait Deviations weight shift ability decreased;forward flexed posture  -CS     Comment, (Gait/Stairs) Pt ambulated from bedside chair to outside of room into hallway and back to bedside chair w/ SBA and safe use of RW.  -CS     Row Name 01/07/23 1406          Motor Skills    Therapeutic Exercise --  Pt sitting: LAQ, sitting marching, hip ab/ad x 25 each LE  -CS     Row Name 01/07/23 1406          Coping    Observed Emotional State calm;cooperative;pleasant  -CS     Verbalized Emotional State acceptance  -CS     Row Name 01/07/23 1406          Plan of Care Review    Plan of  Care Reviewed With patient  -CS     Progress improving  -CS     Outcome Evaluation Pt w/ good endurance for exercise and ambulation this day.  Will continue to follow and progress w/i tolerance.  -     Row Name 01/07/23 1406          Positioning and Restraints    Pre-Treatment Position sitting in chair/recliner  -CS     Post Treatment Position chair  -CS     In Chair encouraged to call for assist;exit alarm on;call light within reach  -     Row Name 01/07/23 1406          Therapy Assessment/Plan (PT)    Rehab Potential (PT) good, to achieve stated therapy goals  -CS     Criteria for Skilled Interventions Met (PT) yes  -CS     Therapy Frequency (PT) 5 times/wk  -     Row Name 01/07/23 1406          Progress Summary (PT)    Progress Toward Functional Goals (PT) progress toward functional goals is good;prepare for discharge  -CS     Daily Progress Summary (PT) Pt w/ good endurance for exercise and ambulation this day.  Will continue to follow and progress w/i tolerance.  -     Row Name 01/07/23 1406          Physical Therapy Goals    Bed Mobility Goal Selection (PT) bed mobility, PT goal 1  -     Transfer Goal Selection (PT) transfer, PT goal 1  -     Gait Training Goal Selection (PT) gait training, PT goal 1  -     Row Name 01/07/23 1406          Bed Mobility Goal 1 (PT)    Activity/Assistive Device (Bed Mobility Goal 1, PT) bed mobility activities, all  -CS     [place] Level/Cues Needed (Bed Mobility Goal 1, PT) minimum assist (75% or more patient effort)  -CS     Time Frame (Bed Mobility Goal 1, PT) by discharge  -     Progress/Outcomes (Bed Mobility Goal 1, PT) goal met  -     Row Name 01/07/23 1406          Transfer Goal 1 (PT)    Activity/Assistive Device (Transfer Goal 1, PT) sit-to-stand/stand-to-sit;bed-to-chair/chair-to-bed  -CS     [place] Level/Cues Needed (Transfer Goal 1, PT) minimum assist (75% or more patient effort)  -CS     Time Frame (Transfer Goal 1, PT) by discharge  -CS      Progress/Outcome (Transfer Goal 1, PT) goal met  -CS     Row Name 01/07/23 1406          Gait Training Goal 1 (PT)    Activity/Assistive Device (Gait Training Goal 1, PT) gait (walking locomotion);assistive device use  -CS     [place] Level (Gait Training Goal 1, PT) minimum assist (75% or more patient effort)  -CS     Distance (Gait Training Goal 1, PT) 30  -CS     Time Frame (Gait Training Goal 1, PT) by discharge  -CS     Progress/Outcome (Gait Training Goal 1, PT) goal partially met  distance not met  -CS           User Key  (r) = Recorded By, (t) = Taken By, (c) = Cosigned By    Initials Name Provider Type    Carlos Victoria, PT Physical Therapist                Physical Therapy Education     Title: PT OT SLP Therapies (Done)     Topic: Physical Therapy (Done)     Point: Mobility training (Done)     Learning Progress Summary           Patient Acceptance, E,TB, VU by KK at 1/6/2023 1225    Acceptance, E, VU by CL at 1/5/2023 1414    Acceptance, E,TB, DU,VU by BD at 1/3/2023 0838    Acceptance, TB,E, VU,DU by BD at 1/2/2023 0907    Acceptance, TB,E, VU,DU by BD at 1/1/2023 0846    Acceptance, E,TB, VU by RM at 12/30/2022 1035    Acceptance, E,TB, VU by AC at 12/28/2022 1505    Acceptance, E,TB, VU by AC at 12/27/2022 1513    Acceptance, E, VU by CL at 12/26/2022 1325    Acceptance, E, VU by CL at 12/22/2022 1826    Acceptance, E, VU by CL at 12/21/2022 1303    Acceptance, E, VU by EB at 12/20/2022 1644    Acceptance, E, VU by CL at 12/17/2022 1703    Acceptance, E,D, VU,NR by LL at 12/16/2022 1605    Acceptance, E,TB, VU by AC at 12/16/2022 1523    Acceptance, E,TB, VU by AC at 12/15/2022 1618    Acceptance, E,TB, VU by AC at 12/14/2022 1503    Acceptance, E,TB, VU by DL at 12/13/2022 0240    Acceptance, D,E, VU,NR by LL at 12/12/2022 1455    Acceptance, E,TB, VU by RW at 12/9/2022 1451    Acceptance, E,TB, VU by BC at 11/27/2022 1319    Acceptance, E,TB, VU by RM at 11/20/2022 1449    Acceptance, E,TB,  VU by RM at 11/18/2022 1021    Acceptance, E,TB, VU by RH at 11/16/2022 1110    Acceptance, E,TB, VU by KK at 11/14/2022 1638    Acceptance, E,TB, VU by KK at 11/13/2022 1648    Acceptance, E,TB, VU by KG at 11/10/2022 1827    Acceptance, E,TB, VU by DM at 11/9/2022 1638    Acceptance, E,TB, VU by KK at 11/8/2022 1754    Acceptance, E,TB, VU by KK at 11/7/2022 1635    Acceptance, E,D, VU,NR by LL at 11/5/2022 1325    Acceptance, E, VU by EB at 11/4/2022 1448    Acceptance, E,TB, VU by KM at 11/4/2022 1101   Family Acceptance, D,E, VU,NR by LL at 12/12/2022 1455                   Point: Home exercise program (Done)     Learning Progress Summary           Patient Acceptance, E,TB, VU by KK at 1/6/2023 1225    Acceptance, E, VU by CL at 1/5/2023 1414    Acceptance, E,TB, DU,VU by BD at 1/3/2023 0838    Acceptance, TB,E, VU,DU by BD at 1/2/2023 0907    Acceptance, TB,E, VU,DU by BD at 1/1/2023 0846    Acceptance, E,TB, VU by RM at 12/30/2022 1035    Acceptance, E,TB, VU by AC at 12/28/2022 1505    Acceptance, E,TB, VU by AC at 12/27/2022 1513    Acceptance, E, VU by CL at 12/26/2022 1325    Acceptance, E, VU by CL at 12/22/2022 1826    Acceptance, E, VU by CL at 12/21/2022 1303    Acceptance, E, VU by EB at 12/20/2022 1644    Acceptance, E, VU by CL at 12/17/2022 1703    Acceptance, E,D, VU,NR by LL at 12/16/2022 1605    Acceptance, E,TB, VU by AC at 12/16/2022 1523    Acceptance, E,TB, VU by AC at 12/15/2022 1618    Acceptance, E,TB, VU by AC at 12/14/2022 1503    Acceptance, E,TB, VU by DL at 12/13/2022 0240    Acceptance, D,E, VU,NR by LL at 12/12/2022 1455    Acceptance, E,TB, VU by RW at 12/9/2022 1451    Acceptance, E,TB, VU by BC at 11/27/2022 1319    Acceptance, E,TB, VU by RM at 11/20/2022 1449    Acceptance, E,TB, VU by RM at 11/18/2022 1021    Acceptance, E,TB, VU by RH at 11/16/2022 1110    Acceptance, E,TB, VU by KK at 11/14/2022 1638    Acceptance, E,TB, VU by KK at 11/13/2022 1648    Acceptance, E,TB, VU  by KG at 11/10/2022 1827    Acceptance, E,TB, VU by DM at 11/9/2022 1638    Acceptance, E,TB, VU by KK at 11/8/2022 1754    Acceptance, E,TB, VU by KK at 11/7/2022 1635    Acceptance, E,D, VU,NR by LL at 11/5/2022 1325    Acceptance, E, VU by EB at 11/4/2022 1448    Acceptance, E,TB, VU by KM at 11/4/2022 1101   Family Acceptance, D,E, VU,NR by LL at 12/12/2022 1455                   Point: Body mechanics (Done)     Learning Progress Summary           Patient Acceptance, E,TB, VU by KK at 1/6/2023 1225    Acceptance, E, VU by CL at 1/5/2023 1414    Acceptance, E,TB, DU,VU by BD at 1/3/2023 0838    Acceptance, TB,E, VU,DU by BD at 1/2/2023 0907    Acceptance, TB,E, VU,DU by BD at 1/1/2023 0846    Acceptance, E,TB, VU by RM at 12/30/2022 1035    Acceptance, E,TB, VU by AC at 12/28/2022 1505    Acceptance, E,TB, VU by AC at 12/27/2022 1513    Acceptance, E, VU by CL at 12/26/2022 1325    Acceptance, E, VU by CL at 12/22/2022 1826    Acceptance, E, VU by CL at 12/21/2022 1303    Acceptance, E, VU by EB at 12/20/2022 1644    Acceptance, E, VU by CL at 12/17/2022 1703    Acceptance, E,D, VU,NR by LL at 12/16/2022 1605    Acceptance, E,TB, VU by AC at 12/16/2022 1523    Acceptance, E,TB, VU by AC at 12/15/2022 1618    Acceptance, E,TB, VU by AC at 12/14/2022 1503    Acceptance, E,TB, VU by DL at 12/13/2022 0240    Acceptance, D,E, VU,NR by LL at 12/12/2022 1455    Acceptance, E,TB, VU by RW at 12/9/2022 1451    Acceptance, E,TB, VU by BC at 11/27/2022 1319    Acceptance, E,TB, VU by RM at 11/20/2022 1449    Acceptance, E,TB, VU by RM at 11/18/2022 1021    Acceptance, E,TB, VU by RH at 11/16/2022 1110    Acceptance, E,TB, VU by KK at 11/14/2022 1638    Acceptance, E,TB, VU by KK at 11/13/2022 1648    Acceptance, E,TB, VU by KG at 11/10/2022 1827    Acceptance, E,TB, VU by DM at 11/9/2022 1638    Acceptance, E,TB, VU by KK at 11/8/2022 1754    Acceptance, E,TB, VU by KK at 11/7/2022 1635    Acceptance, E,D, VU,NR by LL at  11/5/2022 1325    Acceptance, E, VU by EB at 11/4/2022 1448    Acceptance, E,TB, VU by KM at 11/4/2022 1101   Family Acceptance, D,E, VU,NR by LL at 12/12/2022 1455                   Point: Precautions (Done)     Learning Progress Summary           Patient Acceptance, E,TB, VU by KK at 1/6/2023 1225    Acceptance, E, VU by CL at 1/5/2023 1414    Acceptance, E,TB, DU,VU by BD at 1/3/2023 0838    Acceptance, TB,E, VU,DU by BD at 1/2/2023 0907    Acceptance, TB,E, VU,DU by BD at 1/1/2023 0846    Acceptance, E,TB, VU by RM at 12/30/2022 1035    Acceptance, E,TB, VU by AC at 12/28/2022 1505    Acceptance, E,TB, VU by AC at 12/27/2022 1513    Acceptance, E, VU by CL at 12/26/2022 1325    Acceptance, E, VU by CL at 12/22/2022 1826    Acceptance, E, VU by CL at 12/21/2022 1303    Acceptance, E, VU by EB at 12/20/2022 1644    Acceptance, E, VU by CL at 12/17/2022 1703    Acceptance, E,D, VU,NR by LL at 12/16/2022 1605    Acceptance, E,TB, VU by AC at 12/16/2022 1523    Acceptance, E,TB, VU by AC at 12/15/2022 1618    Acceptance, E,TB, VU by AC at 12/14/2022 1503    Acceptance, E,TB, VU by DL at 12/13/2022 0240    Acceptance, D,E, VU,NR by LL at 12/12/2022 1455    Acceptance, E,TB, VU by RW at 12/9/2022 1451    Acceptance, E,TB, VU by BC at 11/27/2022 1319    Acceptance, E,TB, VU by RM at 11/20/2022 1449    Acceptance, E,TB, VU by RM at 11/18/2022 1021    Acceptance, E,TB, VU by RH at 11/16/2022 1110    Acceptance, E,TB, VU by KK at 11/14/2022 1638    Acceptance, E,TB, VU by KK at 11/13/2022 1648    Acceptance, E,TB, VU by KG at 11/10/2022 1827    Acceptance, E,TB, VU by DM at 11/9/2022 1638    Acceptance, E,TB, VU by KK at 11/8/2022 1754    Acceptance, E,TB, VU by KK at 11/7/2022 1635    Acceptance, E,D, VU,NR by LL at 11/5/2022 1325    Acceptance, E, VU by EB at 11/4/2022 1448    Acceptance, E,TB, VU by KM at 11/4/2022 1101   Family Acceptance, D,E, VU,NR by LL at 12/12/2022 1455                               User Key      Initials Effective Dates Name Provider Type Discipline    CL 06/16/21 -  Mitzy Mustafa, RN Registered Nurse Nurse    LL 05/02/16 -  Neda Garcia PTA Physical Therapist Assistant PT    RM 06/14/22 -  Bethany Krause, RN Registered Nurse Nurse    DM 06/16/21 -  Moniz, Deborah, RN Registered Nurse Nurse    BC 08/02/22 -  Collett, Brittany, RN Registered Nurse Nurse    AC 10/13/22 -  Jaya Castillo, RN Registered Nurse Nurse    BD 08/05/21 -  Radha Lynn, RN Registered Nurse Nurse    KM 05/24/22 -  Mao Vázquez, PT Physical Therapist PT    EB 09/22/22 -  Cedric Oliver, RN Registered Nurse Nurse    KK 06/27/22 -  Chelsi Bolivar, RN Registered Nurse Nurse    DL 03/16/22 -  Francy Garcia, RN Registered Nurse Nurse    KG 09/22/22 -  Verónica Rodriguez, RN Registered Nurse Nurse    RW 09/22/22 -  Bethany Schofield, RN Registered Nurse Nurse    RH 08/03/22 - 12/21/22 Jung Russell, SONAM Extern Registered Nurse Nurse              PT Recommendation and Plan  Therapy Frequency (PT): 5 times/wk  Progress Summary (PT)  Progress Toward Functional Goals (PT): progress toward functional goals is good, prepare for discharge  Daily Progress Summary (PT): Pt w/ good endurance for exercise and ambulation this day.  Will continue to follow and progress w/i tolerance.  Plan of Care Reviewed With: patient  Progress: improving  Outcome Evaluation: Pt w/ good endurance for exercise and ambulation this day.  Will continue to follow and progress w/i tolerance.   Outcome Measures     Row Name 01/06/23 1500 01/05/23 1408 01/04/23 1500       How much help from another is currently needed...    Putting on and taking off regular lower body clothing? 3  -LM 3  -LM 3  -LM    Bathing (including washing, rinsing, and drying) 3  -LM 3  -LM 3  -LM    Toileting (which includes using toilet bed pan or urinal) 3  -LM 3  -LM 3  -LM    Putting on and taking off regular upper body clothing 3  -LM 3  -LM 3  -LM    Taking care of personal grooming  (such as brushing teeth) 3  -LM 3  -LM 3  -LM    Eating meals 4  -LM 4  -LM 4  -LM    AM-PAC 6 Clicks Score (OT) 19  -LM 19  -LM 19  -LM       Functional Assessment    Outcome Measure Options AM-PAC 6 Clicks Daily Activity (OT)  -LM AM-PAC 6 Clicks Daily Activity (OT)  -LM AM-PAC 6 Clicks Daily Activity (OT)  -LM          User Key  (r) = Recorded By, (t) = Taken By, (c) = Cosigned By    Initials Name Provider Type    LM Татьяна Bah, OT Occupational Therapist                 Time Calculation:    PT Charges     Row Name 01/07/23 1413             Time Calculation    Start Time 1315  -CS      PT Received On 01/07/23  -CS      PT Goal Re-Cert Due Date 01/20/23  -CS         Timed Charges    06302 - PT Therapeutic Exercise Minutes 15  -CS      11442 - Gait Training Minutes  15  -CS         Total Minutes    Timed Charges Total Minutes 30  -CS       Total Minutes 30  -CS            User Key  (r) = Recorded By, (t) = Taken By, (c) = Cosigned By    Initials Name Provider Type    CS Carlos Duvall, PT Physical Therapist              Therapy Charges for Today     Code Description Service Date Service Provider Modifiers Qty    02707494694 HC PT THER PROC EA 15 MIN 1/7/2023 Carlos Duvall, PT GP 1    10202486146 HC GAIT TRAINING EA 15 MIN 1/7/2023 Carlos Duvall, PT GP 1          PT G-Codes  Outcome Measure Options: AM-PAC 6 Clicks Daily Activity (OT)  AM-PAC 6 Clicks Score (PT): 13  AM-PAC 6 Clicks Score (OT): 19    Carlos Duvall PT  1/7/2023

## 2023-01-08 LAB
ANION GAP SERPL CALCULATED.3IONS-SCNC: 9.3 MMOL/L (ref 5–15)
BASOPHILS # BLD AUTO: 0.07 10*3/MM3 (ref 0–0.2)
BASOPHILS NFR BLD AUTO: 1 % (ref 0–1.5)
BUN SERPL-MCNC: 32 MG/DL (ref 8–23)
BUN/CREAT SERPL: 16.2 (ref 7–25)
CALCIUM SPEC-SCNC: 9.1 MG/DL (ref 8.2–9.6)
CHLORIDE SERPL-SCNC: 101 MMOL/L (ref 98–107)
CO2 SERPL-SCNC: 27.7 MMOL/L (ref 22–29)
CREAT SERPL-MCNC: 1.97 MG/DL (ref 0.57–1)
DEPRECATED RDW RBC AUTO: 47.5 FL (ref 37–54)
EGFRCR SERPLBLD CKD-EPI 2021: 23.8 ML/MIN/1.73
EOSINOPHIL # BLD AUTO: 0.36 10*3/MM3 (ref 0–0.4)
EOSINOPHIL NFR BLD AUTO: 5.2 % (ref 0.3–6.2)
ERYTHROCYTE [DISTWIDTH] IN BLOOD BY AUTOMATED COUNT: 13.9 % (ref 12.3–15.4)
GLUCOSE SERPL-MCNC: 111 MG/DL (ref 65–99)
HCT VFR BLD AUTO: 33.2 % (ref 34–46.6)
HGB BLD-MCNC: 10.2 G/DL (ref 12–15.9)
IMM GRANULOCYTES # BLD AUTO: 0.02 10*3/MM3 (ref 0–0.05)
IMM GRANULOCYTES NFR BLD AUTO: 0.3 % (ref 0–0.5)
LYMPHOCYTES # BLD AUTO: 1.78 10*3/MM3 (ref 0.7–3.1)
LYMPHOCYTES NFR BLD AUTO: 25.5 % (ref 19.6–45.3)
MCH RBC QN AUTO: 28.3 PG (ref 26.6–33)
MCHC RBC AUTO-ENTMCNC: 30.7 G/DL (ref 31.5–35.7)
MCV RBC AUTO: 92.2 FL (ref 79–97)
MONOCYTES # BLD AUTO: 0.66 10*3/MM3 (ref 0.1–0.9)
MONOCYTES NFR BLD AUTO: 9.5 % (ref 5–12)
NEUTROPHILS NFR BLD AUTO: 4.09 10*3/MM3 (ref 1.7–7)
NEUTROPHILS NFR BLD AUTO: 58.5 % (ref 42.7–76)
NRBC BLD AUTO-RTO: 0 /100 WBC (ref 0–0.2)
PLATELET # BLD AUTO: 226 10*3/MM3 (ref 140–450)
PMV BLD AUTO: 10.1 FL (ref 6–12)
POTASSIUM SERPL-SCNC: 4.3 MMOL/L (ref 3.5–5.2)
RBC # BLD AUTO: 3.6 10*6/MM3 (ref 3.77–5.28)
SODIUM SERPL-SCNC: 138 MMOL/L (ref 136–145)
WBC NRBC COR # BLD: 6.98 10*3/MM3 (ref 3.4–10.8)

## 2023-01-08 PROCEDURE — 85025 COMPLETE CBC W/AUTO DIFF WBC: CPT | Performed by: INTERNAL MEDICINE

## 2023-01-08 PROCEDURE — 80048 BASIC METABOLIC PNL TOTAL CA: CPT | Performed by: INTERNAL MEDICINE

## 2023-01-08 RX ADMIN — METOPROLOL TARTRATE 25 MG: 25 TABLET, FILM COATED ORAL at 08:58

## 2023-01-08 RX ADMIN — APIXABAN 5 MG: 5 TABLET, FILM COATED ORAL at 20:48

## 2023-01-08 RX ADMIN — GABAPENTIN 300 MG: 300 CAPSULE ORAL at 20:48

## 2023-01-08 RX ADMIN — DOCUSATE SODIUM 100 MG: 100 CAPSULE ORAL at 20:48

## 2023-01-08 RX ADMIN — DICLOFENAC 4 G: 10 GEL TOPICAL at 20:49

## 2023-01-08 RX ADMIN — FUROSEMIDE 40 MG: 40 TABLET ORAL at 08:58

## 2023-01-08 RX ADMIN — FUROSEMIDE 40 MG: 40 TABLET ORAL at 17:20

## 2023-01-08 RX ADMIN — DICLOFENAC 4 G: 10 GEL TOPICAL at 08:57

## 2023-01-08 RX ADMIN — ESCITALOPRAM 10 MG: 10 TABLET, FILM COATED ORAL at 08:58

## 2023-01-08 RX ADMIN — APIXABAN 5 MG: 5 TABLET, FILM COATED ORAL at 08:58

## 2023-01-08 RX ADMIN — Medication 10 MG: at 20:48

## 2023-01-08 RX ADMIN — DICLOFENAC 4 G: 10 GEL TOPICAL at 17:20

## 2023-01-08 RX ADMIN — DICLOFENAC 4 G: 10 GEL TOPICAL at 11:48

## 2023-01-08 RX ADMIN — TAMSULOSIN HYDROCHLORIDE 0.4 MG: 0.4 CAPSULE ORAL at 08:58

## 2023-01-08 RX ADMIN — ISOSORBIDE MONONITRATE 30 MG: 30 TABLET, EXTENDED RELEASE ORAL at 08:58

## 2023-01-08 RX ADMIN — METOPROLOL TARTRATE 25 MG: 25 TABLET, FILM COATED ORAL at 20:48

## 2023-01-08 RX ADMIN — PANTOPRAZOLE SODIUM 40 MG: 40 TABLET, DELAYED RELEASE ORAL at 05:13

## 2023-01-08 RX ADMIN — DOCUSATE SODIUM 100 MG: 100 CAPSULE ORAL at 08:58

## 2023-01-08 RX ADMIN — MAGNESIUM GLUCONATE 500 MG ORAL TABLET 400 MG: 500 TABLET ORAL at 08:58

## 2023-01-08 RX ADMIN — ACETAMINOPHEN 650 MG: 325 TABLET, FILM COATED ORAL at 20:48

## 2023-01-08 RX ADMIN — Medication 1 TABLET: at 08:58

## 2023-01-08 NOTE — PROGRESS NOTES
Pt admitted to swing bed on 10/5 for additional therapy in the setting of generalized weakness/debility. Pt not seen or examined today. I have reviewed her VS and AM labs. I have discussed her care with SONAM Rodríguez who does not report any acute events or issues that need to be addressed. BP stable today. Cont current antihypertensive regimen. Cont Eliquis for treatment of DVT. Cont PT/OT. Pt has been approved for additional swing bed days through 1/10/23 with updated clinicals due on 1/9. Cont supportive treatment.        Mustapha Loredo,   01/08/23  15:52 EDT

## 2023-01-08 NOTE — PLAN OF CARE
Goal Outcome Evaluation:  Plan of Care Reviewed With: patient        Progress: no change  Outcome Evaluation: Pt resting in chair at this time. Pt has ambulated to C this shift, tolerated well. Vital signs stable. No complaints or concerns at this time.

## 2023-01-08 NOTE — PLAN OF CARE
Problem: Adult Inpatient Plan of Care  Goal: Plan of Care Review  Outcome: Ongoing, Progressing  Flowsheets  Taken 1/8/2023 0428 by Eulalia Rogel, RN  Progress: improving  Outcome Evaluation: Patient doing well. Ambulated to [facility] in room. Updated on POC. VSS. No complaints at this time.  Taken 1/7/2023 1645 by Chantel Daily RN  Plan of Care Reviewed With: patient   Goal Outcome Evaluation:           Progress: improving  Outcome Evaluation: Patient doing well. Ambulated to BS in room. Updated on POC. VSS. No complaints at this time.

## 2023-01-09 PROCEDURE — 97110 THERAPEUTIC EXERCISES: CPT

## 2023-01-09 PROCEDURE — 97116 GAIT TRAINING THERAPY: CPT

## 2023-01-09 PROCEDURE — 97535 SELF CARE MNGMENT TRAINING: CPT

## 2023-01-09 PROCEDURE — 99308 SBSQ NF CARE LOW MDM 20: CPT | Performed by: INTERNAL MEDICINE

## 2023-01-09 PROCEDURE — 97530 THERAPEUTIC ACTIVITIES: CPT

## 2023-01-09 RX ADMIN — METOPROLOL TARTRATE 25 MG: 25 TABLET, FILM COATED ORAL at 09:04

## 2023-01-09 RX ADMIN — FUROSEMIDE 40 MG: 40 TABLET ORAL at 18:15

## 2023-01-09 RX ADMIN — APIXABAN 5 MG: 5 TABLET, FILM COATED ORAL at 20:37

## 2023-01-09 RX ADMIN — GABAPENTIN 300 MG: 300 CAPSULE ORAL at 20:37

## 2023-01-09 RX ADMIN — ISOSORBIDE MONONITRATE 30 MG: 30 TABLET, EXTENDED RELEASE ORAL at 09:05

## 2023-01-09 RX ADMIN — FUROSEMIDE 40 MG: 40 TABLET ORAL at 09:05

## 2023-01-09 RX ADMIN — Medication 10 MG: at 20:37

## 2023-01-09 RX ADMIN — METOPROLOL TARTRATE 25 MG: 25 TABLET, FILM COATED ORAL at 20:37

## 2023-01-09 RX ADMIN — MAGNESIUM GLUCONATE 500 MG ORAL TABLET 400 MG: 500 TABLET ORAL at 09:04

## 2023-01-09 RX ADMIN — Medication 1 TABLET: at 09:05

## 2023-01-09 RX ADMIN — PANTOPRAZOLE SODIUM 40 MG: 40 TABLET, DELAYED RELEASE ORAL at 06:05

## 2023-01-09 RX ADMIN — DICLOFENAC 4 G: 10 GEL TOPICAL at 20:38

## 2023-01-09 RX ADMIN — DOCUSATE SODIUM 100 MG: 100 CAPSULE ORAL at 20:38

## 2023-01-09 RX ADMIN — TAMSULOSIN HYDROCHLORIDE 0.4 MG: 0.4 CAPSULE ORAL at 09:05

## 2023-01-09 RX ADMIN — APIXABAN 5 MG: 5 TABLET, FILM COATED ORAL at 09:05

## 2023-01-09 RX ADMIN — ESCITALOPRAM 10 MG: 10 TABLET, FILM COATED ORAL at 09:05

## 2023-01-09 NOTE — THERAPY TREATMENT NOTE
Acute Care - Occupational Therapy Treatment Note   [place]     Patient Name: Tre Kinney  : 1932  MRN: [id number]  Today's Date: 2023  Onset of Illness/Injury or Date of Surgery: 10/06/22     Referring Physician: Janneth    Admit Date: 10/5/2022     No diagnosis found.  Patient Active Problem List   Diagnosis   • Acid reflux   • Ankle arthralgia   • Cardiac conduction disorder   • Closed fracture of distal fibula   • Arteriosclerosis of coronary artery   • Fracture of distal end of tibia   • Elevated cholesterol   • BP (high blood pressure)   • Hypertrophic polyarthritis   • OP (osteoporosis)   • Right heart failure (HCC)   • Heart failure, chronic, right-sided (HCC)   • SOB (shortness of breath)   • Palpitations   • CHF (congestive heart failure) (HCC)   • Congestive heart failure (HCC)   • Precordial pain   • Chronic anticoagulation   • Hypokalemia   • Anemia, unspecified type   • Generalized weakness     Past Medical History:   Diagnosis Date   • Anemia    • Arthritis    • CHF (congestive heart failure) ([facility])    • Chronic kidney disease    • Coronary artery disease    • H/O blood clots    • History of transfusion     no reaction    • Hypertension      Past Surgical History:   Procedure Laterality Date   • ABDOMINAL SURGERY     • APPENDECTOMY     • CARDIAC SURGERY     •  SECTION     • COLONOSCOPY N/A 3/7/2018    Procedure: COLONOSCOPY;  Surgeon: Willie Gresham MD;  Location:  [facility];  Service:    • CORONARY ANGIOPLASTY WITH STENT PLACEMENT     • ENDOSCOPY N/A 3/6/2018    Procedure: ESOPHAGOGASTRODUODENOSCOPY;  Surgeon: Willie Gresham MD;  Location:  [facility];  Service:    • PACEMAKER IMPLANTATION      left side    • SKIN BIOPSY     • TUBAL ABDOMINAL LIGATION           OT ASSESSMENT FLOWSHEET (last 12 hours)     OT Evaluation and Treatment     Row Name 23 1513                   OT Time and Intention    Subjective Information no complaints  -        Document Type  therapy note (daily note)  -        Mode of Treatment individual therapy;occupational therapy  -KP        Total Minutes, Occupational Therapy 26  -KP        Patient Effort good  -KP        Symptoms Noted During/After Treatment none  -KP        Comment Patient seen for OT treatment session. She was seen from 1045 to 1111. Due to patient performing self care tasks, son not Facetimed during session per patient request. She remained up in reacliner with needs met and call light within reach.  -           General Information    Patient Profile Reviewed yes  -KP        Existing Precautions/Restrictions fall  -KP        Limitations/Impairments safety/cognitive  -        Equipment Issued to Patient gait belt  -KP           Pain Assessment    Pretreatment Pain Rating 0/10 - no pain  -KP        Posttreatment Pain Rating 0/10 - no pain  -KP           Cognition    Affect/Mental Status (Cognition) WFL  -        Orientation Status (Cognition) oriented x 4  -KP        Follows Commands (Cognition) WFL  -        Personal Safety Interventions gait belt;fall prevention program maintained;nonskid shoes/slippers when out of bed  -           Upper Body Dressing Assessment/Training    [place] Level (Upper Body Dressing) upper body dressing skills;modified independence;set up  -        Comment, (Upper Body Dressing) able to doff and kathya pullover garment without assist while seated  -           Toileting Assessment/Training    Comment, (Toileting) Training for clothing management with toileting performed. Patient completed pants over hips X3 trials while standing at recliner simulating task. She was able to complete with minimal assist but noted to remove both hands from rolling walker when performing and loss of balance posteriorly when adjusting her shirt. Patient educated on maintaining one hand on rolling walker to maintain center of gravity and body awareness in space. Also discussed always having chair behind her  when completing such ADL tasks in case of loss of balance to return to sitting. Patient acknowledged but reinforcement needed.  -           Sit-Stand Transfer    Sit-Stand [place] (Transfers) contact guard;minimum assist (75% patient effort)  -        Assistive Device (Sit-Stand Transfers) walker, front-wheeled  -KP        Comment, (Sit-Stand Transfer) X 3 trials  -KP           Stand-Sit Transfer    Stand-Sit [place] (Transfers) verbal cues;contact guard  -        Assistive Device (Stand-Sit Transfers) walker, front-wheeled  -KP        Comment, (Stand-Sit Transfer) X3 trials  -           Motor Skills    Functional Endurance fair plus  -        Therapeutic Exercise other (see comments)  BUE exercise while seated through functional movement patterns X20 reps X2 sets with red theraband (shoulder flexion AROM)  -           Plan of Care Review    Plan of Care Reviewed With patient  -        Progress improving  -KP           Positioning and Restraints    Pre-Treatment Position sitting in chair/recliner  -KP        Post Treatment Position chair  -KP        In Chair sitting;call light within reach;encouraged to call for assist  -KP           Progress Summary (OT)    Progress Toward Functional Goals (OT) progress toward functional goals is fair  -        Daily Progress Summary (OT) Patient continues to improve tolerance for seated UE exercise and sit to stands. Requires intermittent cues for safety with all tasks. Patient continues to need hands on assist with standing ADLs.  -              User Key  (r) = Recorded By, (t) = Taken By, (c) = Cosigned By    Initials Name Effective Dates    Linn Richardson, OT 06/16/21 -                        OT Recommendation and Plan     Progress Toward Functional Goals (OT): progress toward functional goals is fair  Plan of Care Review  Plan of Care Reviewed With: patient  Progress: improving  Outcome Evaluation: Patient seen for OT treatment session 8:55 to  9:44. Initial session addressed functional transfers/mobility with PT present. Then OT addressed UE strength, endurance, and ROM through seated exercises. She then was seen for ADL retraining as documented for toileting. Continue plan of care.  Plan of Care Reviewed With: patient  Outcome Evaluation: Patient seen for OT treatment session 8:55 to 9:44. Initial session addressed functional transfers/mobility with PT present. Then OT addressed UE strength, endurance, and ROM through seated exercises. She then was seen for ADL retraining as documented for toileting. Continue plan of care.     Outcome Measures     Row Name 01/07/23 1400             How much help from another is currently needed...    Putting on and taking off regular lower body clothing? 3  -HB      Bathing (including washing, rinsing, and drying) 3  -HB      Toileting (which includes using toilet bed pan or urinal) 3  -HB      Putting on and taking off regular upper body clothing 3  -HB      Taking care of personal grooming (such as brushing teeth) 3  -HB      Eating meals 4  -HB      AM-PAC 6 Clicks Score (OT) 19  -HB            User Key  (r) = Recorded By, (t) = Taken By, (c) = Cosigned By    Initials Name Provider Type     Katelynn Fernández OT Occupational Therapist                Time Calculation:    Time Calculation- OT     Row Name 01/09/23 1521             Time Calculation- OT    OT Start Time 1045  -      OT Stop Time 1111  -      OT Time Calculation (min) 26 min  -      Total Timed Code Minutes- OT 26 minute(s)  -      OT Received On 01/09/23  -            User Key  (r) = Recorded By, (t) = Taken By, (c) = Cosigned By    Initials Name Provider Type     Linn Quevedo OT Occupational Therapist              Therapy Charges for Today     Code Description Service Date Service Provider Modifiers Qty    [id number]  OT SELF CARE/MGMT/TRAIN EA 15 MIN 1/9/2023 Linn Quevedo OT GO 1    [id number]  OT THER PROC EA 15 MIN 1/9/2023  Linn Quevedo, OT GO 1               Linn Quevedo, OT  1/9/2023

## 2023-01-09 NOTE — PAYOR COMM NOTE
"Michelet Jacques RN  Swing Bed Nurse  [phone] Ex 1462 FAX: [phone]  [email or web address]  [facility]  NPI [id number]  Reference Number [id number]        Tre Kinney (89 y.o. Female)    YOB: 1932  Social Security Number:   Address: [address] [place] [address]  Home Phone: [phone]  MRN: [id number]  Synagogue: [employer]  Marital Status:         Admission Date: 10/5/22  Admission Type: Urgent  Admitting Provider: Jannet Lagunas MD  Attending Provider: Dante Levine MD  Department, Room/Bed: [address], [facility]/  Discharge Date:   Discharge Disposition:   Discharge Destination:               Attending Provider: Dante Levine MD    Allergies: Adhesive Tape, Xanax [Alprazolam]    Isolation: None   Infection: None   Code Status: No CPR    Ht: 170.2 cm (67.01\")   Wt: 76.4 kg (168 lb 8 oz)    Admission Cmt: None   Principal Problem: Generalized weakness [R53.1]                 Active Insurance as of 10/5/2022     Primary Coverage     Payor Plan Insurance Group Employer/Plan Group    ANTH MEDICARE REPLACEMENT ANTHEM MEDICARE ADVANTAGE [id number]     Payor Plan Address Payor Plan Phone Number Payor Plan Fax Number Effective Dates    [address] [phone]  1/1/2022 - None Entered    [facility] [id number]       Subscriber Name Subscriber Birth Date Member ID       TRE KINNEY 12/9/1932 [id number]                 Emergency Contacts      (Rel.) Home Phone Work Phone Mobile Phone    Valdez Kinney (Son) [phone] None None    Leno Kinney (Son) [phone] None [phone]    Willian Kinney (Son) None None [phone]              Current Facility-Administered Medications   Medication Dose Route Frequency Provider Last Rate Last Admin   • acetaminophen (TYLENOL) tablet 650 mg  650 mg Oral Q6H PRN Mustapha Loredo DO   650 mg at 01/08/23 2048   • apixaban (ELIQUIS) tablet 5 mg  5 mg Oral " Q12H Mustapha Loredo DO   5 mg at 01/08/23 2048   • benzocaine-menthol (DERMOPLAST) 20-0.5 % topical spray   Topical 4x Daily PRN Kennedi Saucedo MD   Given at 11/03/22 0548   • calcium carbonate (TUMS) chewable tablet 500 mg (200 mg elemental)  2 tablet Oral TID PRN Benito Gil DO   2 tablet at 12/31/22 1908   • Diclofenac Sodium (VOLTAREN) 1 % gel 4 g  4 g Topical 4x Daily Jannet Lagunas MD   4 g at 01/08/23 2049   • docusate sodium (COLACE) capsule 100 mg  100 mg Oral BID Jannet Lagunas MD   100 mg at 01/08/23 2048   • escitalopram (LEXAPRO) tablet 10 mg  10 mg Oral Daily Jannet Lagunas MD   10 mg at 01/08/23 0858   • furosemide (LASIX) tablet 40 mg  40 mg Oral BID Jannet Lagunas MD   40 mg at 01/08/23 1720   • gabapentin (NEURONTIN) capsule 300 mg  300 mg Oral Nightly Jannet Lagunas MD   300 mg at 01/08/23 2048   • Glycerin (INDRA/PED) suppository 1 g  1 g Rectal Q12H PRN Jannet Lagunas MD   1 g at 01/01/23 2016   • guaiFENesin-dextromethorphan (ROBITUSSIN DM) 100-10 MG/5ML syrup 5 mL  5 mL Oral Q4H PRN Kennedi Saucedo MD       • hydrALAZINE (APRESOLINE) injection 10 mg  10 mg Intravenous Q6H PRN Mustapha Loredo DO       • HYDROcodone-acetaminophen (NORCO) 7.5-325 MG per tablet 1 tablet  1 tablet Oral Q8H PRN Jannet Lagunas MD   1 tablet at 12/31/22 0859   • isosorbide mononitrate (IMDUR) 24 hr tablet 30 mg  30 mg Oral Q24H Benito Gil DO   30 mg at 01/08/23 0858   • magnesium oxide (MAG-OX) tablet 400 mg  400 mg Oral Daily Jannet Lagunas MD   400 mg at 01/08/23 0858   • Magnesium Sulfate 2 gram Bolus, followed by 8 gram infusion (total Mg dose 10 grams)- Mg less than or equal to 1mg/dL  2 g Intravenous PRN Jannet Lagunas MD        Or   • Magnesium Sulfate 2 gram / 50mL Infusion (GIVE X 3 BAGS TO EQUAL 6GM TOTAL DOSE) - Mg 1.1 - 1.5 mg/dl  2 g Intravenous PRN Jannet Lagunas MD        Or   • Magnesium Sulfate 4 gram infusion- Mg 1.6-1.9  mg/dL  4 g Intravenous PRN Jannet Lagunas MD       • melatonin tablet 10 mg  10 mg Oral Nightly Jannet Lagunas MD   10 mg at 01/08/23 2048   • metoprolol tartrate (LOPRESSOR) tablet 25 mg  25 mg Oral Q12H Dante Levine MD   25 mg at 01/08/23 2048   • multivitamin (THERAGRAN) tablet 1 tablet  1 tablet Oral Daily Jannet Lagunas MD   1 tablet at 01/08/23 0858   • ondansetron (ZOFRAN) injection 4 mg  4 mg Intravenous Q6H PRN Jannet Lagunas MD       • pantoprazole (PROTONIX) EC tablet 40 mg  40 mg Oral Q AM Jannet Lagunas MD   40 mg at 01/09/23 0605   • phenol (CHLORASEPTIC) 1.4 % liquid 1 spray  1 spray Mouth/Throat Q2H PRN Jannet Lagunas MD   1 spray at 10/13/22 2056   • polyethylene glycol (MIRALAX) packet 17 g  17 g Oral BID PRN Jannet Lagunas MD   17 g at 12/14/22 0053   • potassium chloride (K-DUR,KLOR-CON) CR tablet 40 mEq  40 mEq Oral PRN Jannet Lagunas MD        Or   • potassium chloride (KLOR-CON) packet 40 mEq  40 mEq Oral PRN Jannet Lagunas MD        Or   • potassium chloride 10 mEq in 100 mL IVPB  10 mEq Intravenous Q1H PRN Jannet Lagunas MD       • prochlorperazine (COMPAZINE) injection 5 mg  5 mg Intravenous Q6H PRN Jannet Lagunas MD        Or   • prochlorperazine (COMPAZINE) tablet 5 mg  5 mg Oral Q6H PRN Jannet Lagunas MD   5 mg at 12/31/22 1818    Or   • prochlorperazine (COMPAZINE) suppository 25 mg  25 mg Rectal Q12H PRN Jannet Lagunas MD       • promethazine (PHENERGAN) 6.25 mg in sodium chloride 0.9 % 50 mL  6.25 mg Intravenous Q6H PRN Jannet Lagunas MD       • sodium chloride 0.9 % flush 10 mL  10 mL Intravenous PRN Jannet Lagunas MD       • sodium chloride 0.9 % flush 10 mL  10 mL Intravenous Q12H Jannet Lagunas MD   10 mL at 01/02/23 0832   • sodium chloride 0.9 % flush 10 mL  10 mL Intravenous PRN Jannet Lagunas MD       • sodium chloride 0.9 % flush 10 mL  10 mL Intravenous Q12H Jannet Lagunas MD   10 mL at 01/02/23 0884    • sodium chloride 0.9 % flush 10 mL  10 mL Intravenous PRN Jannet Lagunas MD       • tamsulosin (FLOMAX) 24 hr capsule 0.4 mg  0.4 mg Oral Daily Jannet Lagunas MD   0.4 mg at 01/08/23 0858        Physician Progress Notes (last 48 hours)      Mustapha Loredo DO at 01/08/23 1350        Pt admitted to swing bed on 10/5 for additional therapy in the setting of generalized weakness/debility. Pt not seen or examined today. I have reviewed her VS and AM labs. I have discussed her care with SONAM Rodríguez who does not report any acute events or issues that need to be addressed. BP stable today. Cont current antihypertensive regimen. Cont Eliquis for treatment of DVT. Cont PT/OT. Pt has been approved for additional swing bed days through 1/10/23 with updated clinicals due on 1/9. Cont supportive treatment.        Mustapha Loredo DO  01/08/23  15:52 EDT                Electronically signed by Mustapha Loredo DO at 01/08/23 1351     Mustapha Loredo DO at 01/07/23 1148        Pt admitted to swing bed on 10/5 for additional therapy in the setting of generalized weakness/debility. Pt not seen or examined today. I have reviewed her VS. I have discussed her care with SONAM Tejeda who does not report any acute events or issues that need to be addressed. BP has fluctuated today. Cont current antihypertensive regimen. Cont Eliquis for treatment of DVT. Cont PT/OT. Pt has been approved for additional swing bed days through 1/10/23 with updated clinicals due on 1/9. Cont supportive treatment. Repeat labs in the AM.       Mustapha Loredo DO  01/07/23  15:52 EDT                Electronically signed by Mustapha Loredo DO at 01/07/23 1149          Consult Notes (most recent note)      Andrew Chapman MD at 11/03/22 1249      Consult Orders    1. Inpatient Psychiatrist Consult [[id number]] ordered by Kennedi Saucedo MD at 11/03/22 0792    2. Inpatient Psychiatrist Consult  [[id number]] ordered by Kennedi Saucedo MD at 10/31/22 1147                   Referring Provider: Lewis  Reason for Consultation: Capacity      Chief complaint/Focus of Exam: Capacity Evaluation     Subjective .     History of present illness: Patient is an 89-year-old female with a history significant for CHF, CKD, CAD, history of clots, chronic anticoagulation, sick sinus syndrome with pacer placement, chronic diastolic CHF, anemia, and depression who originally admitted to the hospital after a fall.  Psychiatry was originally consulted early in her hospitalization 1 month ago for depressive symptoms and for medication management in light of hyponatremia.  I evaluated patient at that time.  Psychiatry was reconsulted to evaluate patient for medical decision-making capacity as there have been some concerns about treatment care planning going forward and family has been involved.  I evaluated patient at bedside today while she was eating lunch.  She was sitting up and reported that she was having some neck pain but denied any other complaints.  She was alert and oriented x4.  She is able to answer all my questions appropriately.  She was not experiencing any confusion, hallucinations, delusions, or paranoia.  She denied SI/HI/AVH.  She was making jokes about being in the hospital and seemed to be in good spirits despite her neck pain.    Review of Systems  Pertinent items are noted in HPI    History  Past Medical History:   Diagnosis Date   • Anemia    • Arthritis    • CHF (congestive heart failure) (HCC)    • Chronic kidney disease    • Coronary artery disease    • H/O blood clots    • History of transfusion     no reaction    • Hypertension    ,   Past Surgical History:   Procedure Laterality Date   • ABDOMINAL SURGERY     • APPENDECTOMY     • CARDIAC SURGERY     •  SECTION     • COLONOSCOPY N/A 3/7/2018    Procedure: COLONOSCOPY;  Surgeon: Willie Gresham MD;  Location: [facility] ENDOSCOPY;  Service:     • CORONARY ANGIOPLASTY WITH STENT PLACEMENT     • ENDOSCOPY N/A 3/6/2018    Procedure: ESOPHAGOGASTRODUODENOSCOPY;  Surgeon: Willie Gresham MD;  Location: [facility] ENDOSCOPY;  Service:    • PACEMAKER IMPLANTATION      left side    • SKIN BIOPSY     • TUBAL ABDOMINAL LIGATION     ,   Family History   Problem Relation Age of Onset   • Other Mother         cardiac disorder   • Hypertension Mother    • Heart disease Mother    • Thyroid disease Mother    • Other Father         cardiac disorder   • Heart disease Father    • Hypertension Father    • Other Sister         cardiac disorder   • Other Brother         cardiac disorder   • Other Other         cardiac disorder   • Other Other         cardiac disorder   ,   Social History     Socioeconomic History   • Marital status:    Tobacco Use   • Smoking status: Never   Substance and Sexual Activity   • Alcohol use: No   • Drug use: No   • Sexual activity: Defer     E-cigarette/Vaping     E-cigarette/Vaping Substances     E-cigarette/Vaping Devices       ,   Medications Prior to Admission   Medication Sig Dispense Refill Last Dose   • ALPRAZolam (XANAX) 1 MG tablet Take 1 mg by mouth 2 (Two) Times a Day As Needed for Anxiety.   Unknown at Unknown time   • aspirin 81 MG EC tablet Take 81 mg by mouth Daily.   Unknown at Unknown time   • escitalopram (LEXAPRO) 10 MG tablet Take 10 mg by mouth Daily.   Unknown at Unknown time   • furosemide (LASIX) 40 MG tablet Take 40 mg by mouth Daily.   Unknown at Unknown time   • gabapentin (NEURONTIN) 400 MG capsule Take 400 mg by mouth Every Night.   Unknown at Unknown time   • hydrALAZINE (APRESOLINE) 100 MG tablet Take 1 tablet by mouth Every 8 (Eight) Hours. 90 tablet 0 Unknown at Unknown time   • HYDROcodone-acetaminophen (NORCO) 7.5-325 MG per tablet Take 1 tablet by mouth Every 8 (Eight) Hours As Needed for Moderate Pain.   Unknown at Unknown time   • isosorbide mononitrate (IMDUR) 60 MG 24 hr tablet Take 60 mg by mouth Daily.    Unknown at Unknown time   • metoprolol tartrate (LOPRESSOR) 50 MG tablet Take 50 mg by mouth 2 (Two) Times a Day.   Unknown at Unknown time   • pantoprazole (PROTONIX) 40 MG EC tablet Take 40 mg by mouth Daily.   Unknown at Unknown time   • potassium chloride (MICRO-K) 10 MEQ CR capsule Take 10 mEq by mouth 2 (Two) Times a Day.   Unknown at Unknown time   • warfarin (COUMADIN) 6 MG tablet Take 6 mg by mouth Daily.   Unknown at Unknown time   , Scheduled Meds:  apixaban, 5 mg, Oral, Q12H  escitalopram, 10 mg, Oral, Daily  furosemide, 40 mg, Oral, BID  gabapentin, 300 mg, Oral, Nightly  hydrALAZINE, 5 mg, Oral, Q8H  melatonin, 10 mg, Oral, Nightly  metoprolol tartrate, 12.5 mg, Oral, Q12H  multivitamin, 1 tablet, Oral, Daily  pantoprazole, 40 mg, Oral, Q AM  sodium chloride, 10 mL, Intravenous, Q12H  sodium chloride, 10 mL, Intravenous, Q12H  tamsulosin, 0.4 mg, Oral, Daily    , Continuous Infusions:  Pharmacy Consult,     , PRN Meds:  •  benzocaine-menthol  •  docusate sodium  •  HYDROcodone-acetaminophen  •  magnesium sulfate **OR** magnesium sulfate **OR** magnesium sulfate  •  ondansetron  •  Pharmacy Consult  •  phenol  •  polyethylene glycol  •  potassium chloride **OR** potassium chloride **OR** potassium chloride  •  prochlorperazine **OR** prochlorperazine **OR** prochlorperazine  •  promethazine  •  sodium chloride  •  sodium chloride  •  sodium chloride and Allergies:  Adhesive tape and Xanax [alprazolam]    Objective     Vital Signs   Temp:  [98.2 °F (36.8 °C)-98.4 °F (36.9 °C)] 98.2 °F (36.8 °C)  Heart Rate:  [71-77] 75  Resp:  [16-18] 16  BP: (129-161)/() 161/116    Mental Status Exam:   Mental Status Exam:    Hygiene:   good  Cooperation:  Cooperative  Eye Contact:  Good  Psychomotor Behavior:  Slow  Affect:  Full range  Hopelessness: Optimistic  Speech:  Normal  Thought Progress:  Goal directed and Linear  Thought Content:  Normal and Mood congruent  Suicidal:  None  Homicidal:   None  Hallucinations:  None  Delusion:  None  Memory:  Intact  Orientation:  Person, Place, Time and Situation  Reliability:  good  Insight:  Fair  Judgement:  Fair  Impulse Control:  Fair    Results Review:   I reviewed the patient's new clinical results.  Lab Results (last 24 hours)     ** No results found for the last 24 hours. **        Imaging Results (Last 24 Hours)     ** No results found for the last 24 hours. **            Assessment & Plan     Evaluation for medical decision making capacity  -At this point in time, patient does have capacity for medical decision making.  She is alert and oriented and able to answer questions appropriately and I do not have any concerns about her making her own treatment plan and decisions going forward.    I discussed the patients findings and my recommendations with patient    Andrew Chapman MD  22  12:49 EDT        Electronically signed by Andrew Chapman MD at 22 1253          Physical Therapy Notes (most recent note)      Carlos Duvall, PT at 23 1414  Version 1 of 1         Acute Care - Physical Therapy Treatment Note  [facility]     Patient Name: Tre Kinney  : 1932  MRN: [id number]  Today's Date: 2023   Onset of Illness/Injury or Date of Surgery: 10/06/22  Visit Dx:   No diagnosis found.  Patient Active Problem List   Diagnosis   • Acid reflux   • Ankle arthralgia   • Cardiac conduction disorder   • Closed fracture of distal fibula   • Arteriosclerosis of coronary artery   • Fracture of distal end of tibia   • Elevated cholesterol   • BP (high blood pressure)   • Hypertrophic polyarthritis   • OP (osteoporosis)   • Right heart failure (HCC)   • Heart failure, chronic, right-sided (HCC)   • SOB (shortness of breath)   • Palpitations   • CHF (congestive heart failure) (HCC)   • Congestive heart failure (HCC)   • Precordial pain   • Chronic anticoagulation   • Hypokalemia   • Anemia, unspecified type   • Generalized weakness     Past  Medical History:   Diagnosis Date   • Anemia    • Arthritis    • CHF (congestive heart failure) (HCC)    • Chronic kidney disease    • Coronary artery disease    • H/O blood clots    • History of transfusion     no reaction    • Hypertension      Past Surgical History:   Procedure Laterality Date   • ABDOMINAL SURGERY     • APPENDECTOMY     • CARDIAC SURGERY     •  SECTION     • COLONOSCOPY N/A 3/7/2018    Procedure: COLONOSCOPY;  Surgeon: Willie Gresham MD;  Location:  [facility] ENDOSCOPY;  Service:    • CORONARY ANGIOPLASTY WITH STENT PLACEMENT     • ENDOSCOPY N/A 3/6/2018    Procedure: ESOPHAGOGASTRODUODENOSCOPY;  Surgeon: Willie Gresham MD;  Location:  [facility] ENDOSCOPY;  Service:    • PACEMAKER IMPLANTATION      left side    • SKIN BIOPSY     • TUBAL ABDOMINAL LIGATION       PT Assessment (last 12 hours)     PT Evaluation and Treatment     Row Name 23 1406          Physical Therapy Time and Intention    Subjective Information no complaints  -CS     Document Type therapy note (daily note)  -CS     Mode of Treatment physical therapy;individual therapy  -CS     Patient Effort good  -CS     Comment Pt seen bedside this PM.  Pt agreed to therex and ambulation.  -CS     Row Name 23 140          General Information    Patient Profile Reviewed yes  -CS     Row Name 23 1406          Cognition    Affect/Mental Status (Cognition) WFL  -CS     Orientation Status (Cognition) oriented x 4  -CS     Follows Commands (Cognition) WFL  -CS     Row Name 23 140          Transfers    Transfers sit-stand transfer;stand-sit transfer  -CS     Comment, (Transfers) Pt came to standing to RW w/ supervision  -CS     Row Name 23 140          Sit-Stand Transfer    Sit-Stand [place] (Transfers) standby assist;supervision  -CS     Assistive Device (Sit-Stand Transfers) walker, front-wheeled  -CS     Row Name 23 1406          Stand-Sit Transfer    Stand-Sit [place] (Transfers) verbal  cues;standby assist  -CS     Assistive Device (Stand-Sit Transfers) walker, front-wheeled  -CS     Row Name 01/07/23 1406          Gait/Stairs (Locomotion)    [place] Level (Gait) standby assist  -CS     Assistive Device (Gait) walker, front-wheeled  -CS     Distance in Feet (Gait) 25'  -CS     Pattern (Gait) step-to  -CS     Deviations/Abnormal Patterns (Gait) stride length decreased;gait speed decreased;festinating/shuffling;weight shifting decreased  -CS     Bilateral Gait Deviations weight shift ability decreased;forward flexed posture  -CS     Comment, (Gait/Stairs) Pt ambulated from bedside chair to outside of room into hallway and back to bedside chair w/ SBA and safe use of RW.  -CS     Row Name 01/07/23 1406          Motor Skills    Therapeutic Exercise --  Pt sitting: LAQ, sitting marching, hip ab/ad x 25 each LE  -CS     Row Name 01/07/23 1406          Coping    Observed Emotional State calm;cooperative;pleasant  -CS     Verbalized Emotional State acceptance  -CS     Row Name 01/07/23 1406          Plan of Care Review    Plan of Care Reviewed With patient  -CS     Progress improving  -CS     Outcome Evaluation Pt w/ good endurance for exercise and ambulation this day.  Will continue to follow and progress w/i tolerance.  -CS     Row Name 01/07/23 1406          Positioning and Restraints    Pre-Treatment Position sitting in chair/recliner  -CS     Post Treatment Position chair  -CS     In Chair encouraged to call for assist;exit alarm on;call light within reach  -CS     Row Name 01/07/23 1406          Therapy Assessment/Plan (PT)    Rehab Potential (PT) good, to achieve stated therapy goals  -CS     Criteria for Skilled Interventions Met (PT) yes  -CS     Therapy Frequency (PT) 5 times/wk  -CS     Row Name 01/07/23 1406          Progress Summary (PT)    Progress Toward Functional Goals (PT) progress toward functional goals is good;prepare for discharge  -CS     Daily Progress Summary (PT) Pt w/ good  endurance for exercise and ambulation this day.  Will continue to follow and progress w/i tolerance.  -CS     Row Name 01/07/23 1406          Physical Therapy Goals    Bed Mobility Goal Selection (PT) bed mobility, PT goal 1  -CS     Transfer Goal Selection (PT) transfer, PT goal 1  -CS     Gait Training Goal Selection (PT) gait training, PT goal 1  -CS     Row Name 01/07/23 1406          Bed Mobility Goal 1 (PT)    Activity/Assistive Device (Bed Mobility Goal 1, PT) bed mobility activities, all  -CS     [place] Level/Cues Needed (Bed Mobility Goal 1, PT) minimum assist (75% or more patient effort)  -CS     Time Frame (Bed Mobility Goal 1, PT) by discharge  -CS     Progress/Outcomes (Bed Mobility Goal 1, PT) goal met  -CS     Row Name 01/07/23 1406          Transfer Goal 1 (PT)    Activity/Assistive Device (Transfer Goal 1, PT) sit-to-stand/stand-to-sit;bed-to-chair/chair-to-bed  -CS     [place] Level/Cues Needed (Transfer Goal 1, PT) minimum assist (75% or more patient effort)  -CS     Time Frame (Transfer Goal 1, PT) by discharge  -CS     Progress/Outcome (Transfer Goal 1, PT) goal met  -CS     Row Name 01/07/23 1406          Gait Training Goal 1 (PT)    Activity/Assistive Device (Gait Training Goal 1, PT) gait (walking locomotion);assistive device use  -CS     [place] Level (Gait Training Goal 1, PT) minimum assist (75% or more patient effort)  -CS     Distance (Gait Training Goal 1, PT) 30  -CS     Time Frame (Gait Training Goal 1, PT) by discharge  -CS     Progress/Outcome (Gait Training Goal 1, PT) goal partially met  distance not met  -CS           User Key  (r) = Recorded By, (t) = Taken By, (c) = Cosigned By    Initials Name Provider Type    CS Carlos Duvall PT Physical Therapist                Physical Therapy Education     Title: PT OT SLP Therapies (Done)     Topic: Physical Therapy (Done)     Point: Mobility training (Done)     Learning Progress Summary           Patient Acceptance,  E,TB, VU by KK at 1/6/2023 1225    Acceptance, E, VU by CL at 1/5/2023 1414    Acceptance, E,TB, DU,VU by BD at 1/3/2023 0838    Acceptance, TB,E, VU,DU by BD at 1/2/2023 0907    Acceptance, TB,E, VU,DU by BD at 1/1/2023 0846    Acceptance, E,TB, VU by RM at 12/30/2022 1035    Acceptance, E,TB, VU by AC at 12/28/2022 1505    Acceptance, E,TB, VU by AC at 12/27/2022 1513    Acceptance, E, VU by CL at 12/26/2022 1325    Acceptance, E, VU by CL at 12/22/2022 1826    Acceptance, E, VU by CL at 12/21/2022 1303    Acceptance, E, VU by EB at 12/20/2022 1644    Acceptance, E, VU by CL at 12/17/2022 1703    Acceptance, E,D, VU,NR by LL at 12/16/2022 1605    Acceptance, E,TB, VU by AC at 12/16/2022 1523    Acceptance, E,TB, VU by AC at 12/15/2022 1618    Acceptance, E,TB, VU by AC at 12/14/2022 1503    Acceptance, E,TB, VU by DL at 12/13/2022 0240    Acceptance, D,E, VU,NR by LL at 12/12/2022 1455    Acceptance, E,TB, VU by RW at 12/9/2022 1451    Acceptance, E,TB, VU by BC at 11/27/2022 1319    Acceptance, E,TB, VU by RM at 11/20/2022 1449    Acceptance, E,TB, VU by RM at 11/18/2022 1021    Acceptance, E,TB, VU by RH at 11/16/2022 1110    Acceptance, E,TB, VU by KK at 11/14/2022 1638    Acceptance, E,TB, VU by KK at 11/13/2022 1648    Acceptance, E,TB, VU by KG at 11/10/2022 1827    Acceptance, E,TB, VU by DM at 11/9/2022 1638    Acceptance, E,TB, VU by KK at 11/8/2022 1754    Acceptance, E,TB, VU by KK at 11/7/2022 1635    Acceptance, E,D, VU,NR by LL at 11/5/2022 1325    Acceptance, E, VU by EB at 11/4/2022 1448    Acceptance, E,TB, VU by KM at 11/4/2022 1101   Family Acceptance, D,E, VU,NR by LL at 12/12/2022 1455                   Point: Home exercise program (Done)     Learning Progress Summary           Patient Acceptance, E,TB, VU by KK at 1/6/2023 1225    Acceptance, E, VU by CL at 1/5/2023 1414    Acceptance, E,TB, DU,VU by BD at 1/3/2023 0838    Acceptance, TB,E, VU,DU by BD at 1/2/2023 0907    Acceptance, TB,E,  VU,DU by BD at 1/1/2023 0846    Acceptance, E,TB, VU by RM at 12/30/2022 1035    Acceptance, E,TB, VU by AC at 12/28/2022 1505    Acceptance, E,TB, VU by AC at 12/27/2022 1513    Acceptance, E, VU by CL at 12/26/2022 1325    Acceptance, E, VU by CL at 12/22/2022 1826    Acceptance, E, VU by CL at 12/21/2022 1303    Acceptance, E, VU by EB at 12/20/2022 1644    Acceptance, E, VU by CL at 12/17/2022 1703    Acceptance, E,D, VU,NR by LL at 12/16/2022 1605    Acceptance, E,TB, VU by AC at 12/16/2022 1523    Acceptance, E,TB, VU by AC at 12/15/2022 1618    Acceptance, E,TB, VU by AC at 12/14/2022 1503    Acceptance, E,TB, VU by DL at 12/13/2022 0240    Acceptance, D,E, VU,NR by LL at 12/12/2022 1455    Acceptance, E,TB, VU by RW at 12/9/2022 1451    Acceptance, E,TB, VU by BC at 11/27/2022 1319    Acceptance, E,TB, VU by RM at 11/20/2022 1449    Acceptance, E,TB, VU by RM at 11/18/2022 1021    Acceptance, E,TB, VU by RH at 11/16/2022 1110    Acceptance, E,TB, VU by KK at 11/14/2022 1638    Acceptance, E,TB, VU by KK at 11/13/2022 1648    Acceptance, E,TB, VU by KG at 11/10/2022 1827    Acceptance, E,TB, VU by DM at 11/9/2022 1638    Acceptance, E,TB, VU by KK at 11/8/2022 1754    Acceptance, E,TB, VU by KK at 11/7/2022 1635    Acceptance, E,D, VU,NR by LL at 11/5/2022 1325    Acceptance, E, VU by EB at 11/4/2022 1448    Acceptance, E,TB, VU by KM at 11/4/2022 1101   Family Acceptance, D,E, VU,NR by LL at 12/12/2022 1455                   Point: Body mechanics (Done)     Learning Progress Summary           Patient Acceptance, E,TB, VU by KK at 1/6/2023 1225    Acceptance, E, VU by CL at 1/5/2023 1414    Acceptance, E,TB, DU,VU by BD at 1/3/2023 0838    Acceptance, TB,E, VU,DU by BD at 1/2/2023 0907    Acceptance, TB,E, VU,DU by BD at 1/1/2023 0846    Acceptance, E,TB, VU by RM at 12/30/2022 1035    Acceptance, E,TB, VU by AC at 12/28/2022 1505    Acceptance, E,TB, VU by AC at 12/27/2022 1513    Acceptance, E, VU by CL at  12/26/2022 1325    Acceptance, E, VU by CL at 12/22/2022 1826    Acceptance, E, VU by CL at 12/21/2022 1303    Acceptance, E, VU by EB at 12/20/2022 1644    Acceptance, E, VU by CL at 12/17/2022 1703    Acceptance, E,D, VU,NR by LL at 12/16/2022 1605    Acceptance, E,TB, VU by AC at 12/16/2022 1523    Acceptance, E,TB, VU by AC at 12/15/2022 1618    Acceptance, E,TB, VU by AC at 12/14/2022 1503    Acceptance, E,TB, VU by DL at 12/13/2022 0240    Acceptance, D,E, VU,NR by LL at 12/12/2022 1455    Acceptance, E,TB, VU by RW at 12/9/2022 1451    Acceptance, E,TB, VU by BC at 11/27/2022 1319    Acceptance, E,TB, VU by RM at 11/20/2022 1449    Acceptance, E,TB, VU by RM at 11/18/2022 1021    Acceptance, E,TB, VU by RH at 11/16/2022 1110    Acceptance, E,TB, VU by KK at 11/14/2022 1638    Acceptance, E,TB, VU by KK at 11/13/2022 1648    Acceptance, E,TB, VU by KG at 11/10/2022 1827    Acceptance, E,TB, VU by DM at 11/9/2022 1638    Acceptance, E,TB, VU by KK at 11/8/2022 1754    Acceptance, E,TB, VU by KK at 11/7/2022 1635    Acceptance, E,D, VU,NR by LL at 11/5/2022 1325    Acceptance, E, VU by EB at 11/4/2022 1448    Acceptance, E,TB, VU by KM at 11/4/2022 1101   Family Acceptance, D,E, VU,NR by LL at 12/12/2022 1455                   Point: Precautions (Done)     Learning Progress Summary           Patient Acceptance, E,TB, VU by KK at 1/6/2023 1225    Acceptance, E, VU by CL at 1/5/2023 1414    Acceptance, E,TB, DU,VU by BD at 1/3/2023 0838    Acceptance, TB,E, VU,DU by BD at 1/2/2023 0907    Acceptance, TB,E, VU,DU by BD at 1/1/2023 0846    Acceptance, E,TB, VU by RM at 12/30/2022 1035    Acceptance, E,TB, VU by AC at 12/28/2022 1505    Acceptance, E,TB, VU by AC at 12/27/2022 1513    Acceptance, E, VU by CL at 12/26/2022 1325    Acceptance, E, VU by CL at 12/22/2022 1826    Acceptance, E, VU by CL at 12/21/2022 1303    Acceptance, E, VU by EB at 12/20/2022 1644    Acceptance, E, VU by CL at 12/17/2022 1703     Acceptance, E,D, VU,NR by LL at 12/16/2022 1605    Acceptance, E,TB, VU by AC at 12/16/2022 1523    Acceptance, E,TB, VU by AC at 12/15/2022 1618    Acceptance, E,TB, VU by AC at 12/14/2022 1503    Acceptance, E,TB, VU by DL at 12/13/2022 0240    Acceptance, D,E, VU,NR by LL at 12/12/2022 1455    Acceptance, E,TB, VU by RW at 12/9/2022 1451    Acceptance, E,TB, VU by BC at 11/27/2022 1319    Acceptance, E,TB, VU by RM at 11/20/2022 1449    Acceptance, E,TB, VU by RM at 11/18/2022 1021    Acceptance, E,TB, VU by RH at 11/16/2022 1110    Acceptance, E,TB, VU by KK at 11/14/2022 1638    Acceptance, E,TB, VU by KK at 11/13/2022 1648    Acceptance, E,TB, VU by KG at 11/10/2022 1827    Acceptance, E,TB, VU by DM at 11/9/2022 1638    Acceptance, E,TB, VU by KK at 11/8/2022 1754    Acceptance, E,TB, VU by KK at 11/7/2022 1635    Acceptance, E,D, VU,NR by LL at 11/5/2022 1325    Acceptance, E, VU by EB at 11/4/2022 1448    Acceptance, E,TB, VU by KM at 11/4/2022 1101   Family Acceptance, D,E, VU,NR by LL at 12/12/2022 1455                               User Key     Initials Effective Dates Name Provider Type Discipline    CL 06/16/21 -  Mitzy Mustafa, RN Registered Nurse Nurse    LL 05/02/16 -  Neda Garcia PTA Physical Therapist Assistant PT    RM 06/14/22 -  Bethany Krause RN Registered Nurse Nurse    DM 06/16/21 -  Moniz, Deborah, RN Registered Nurse Nurse    BC 08/02/22 -  Collett, Brittany, RN Registered Nurse Nurse    AC 10/13/22 -  Jaya Castillo, SONAM Registered Nurse Nurse    BD 08/05/21 -  Radha Lynn, RN Registered Nurse Nurse    KM 05/24/22 -  Mao Vázquez, EDEL Physical Therapist PT    EB 09/22/22 -  Cedric Oliver, RN Registered Nurse Nurse    KK 06/27/22 -  Chelsi Bolivar, RN Registered Nurse Nurse    DL 03/16/22 -  Francy Garcia, RN Registered Nurse Nurse    KG 09/22/22 -  Verónica Rodriguez, RN Registered Nurse Nurse    RW 09/22/22 -  Bethany Schofield, RN Registered Nurse Nurse    RH 08/03/22 -  12/21/22 Jung Russell, RN Extern Registered Nurse Nurse              PT Recommendation and Plan  Therapy Frequency (PT): 5 times/wk  Progress Summary (PT)  Progress Toward Functional Goals (PT): progress toward functional goals is good, prepare for discharge  Daily Progress Summary (PT): Pt w/ good endurance for exercise and ambulation this day.  Will continue to follow and progress w/i tolerance.  Plan of Care Reviewed With: patient  Progress: improving  Outcome Evaluation: Pt w/ good endurance for exercise and ambulation this day.  Will continue to follow and progress w/i tolerance.   Outcome Measures     Row Name 01/06/23 1500 01/05/23 1408 01/04/23 1500       How much help from another is currently needed...    Putting on and taking off regular lower body clothing? 3  -LM 3  -LM 3  -LM    Bathing (including washing, rinsing, and drying) 3  -LM 3  -LM 3  -LM    Toileting (which includes using toilet bed pan or urinal) 3  -LM 3  -LM 3  -LM    Putting on and taking off regular upper body clothing 3  -LM 3  -LM 3  -LM    Taking care of personal grooming (such as brushing teeth) 3  -LM 3  -LM 3  -LM    Eating meals 4  -LM 4  -LM 4  -LM    AM-PAC 6 Clicks Score (OT) 19  -LM 19  -LM 19  -LM       Functional Assessment    Outcome Measure Options AM-PAC 6 Clicks Daily Activity (OT)  -LM AM-PAC 6 Clicks Daily Activity (OT)  -LM AM-PAC 6 Clicks Daily Activity (OT)  -LM          User Key  (r) = Recorded By, (t) = Taken By, (c) = Cosigned By    Initials Name Provider Type    Татьяна Mccabe, OT Occupational Therapist                 Time Calculation:    PT Charges     Row Name 01/07/23 1413             Time Calculation    Start Time 1315  -CS      PT Received On 01/07/23  -CS      PT Goal Re-Cert Due Date 01/20/23  -CS         Timed Charges    35555 - PT Therapeutic Exercise Minutes 15  -CS      42848 - Gait Training Minutes  15  -CS         Total Minutes    Timed Charges Total Minutes 30  -CS       Total Minutes 30  -CS             User Key  (r) = Recorded By, (t) = Taken By, (c) = Cosigned By    Initials Name Provider Type    CS Carlos Duvall, PT Physical Therapist              Therapy Charges for Today     Code Description Service Date Service Provider Modifiers Qty    43135632990 HC PT THER PROC EA 15 MIN 2023 Carlos Duvall, PT GP 1    00227777458 HC GAIT TRAINING EA 15 MIN 2023 Carlos Duvall, PT GP 1          PT G-Codes  Outcome Measure Options: AM-PAC 6 Clicks Daily Activity (OT)  AM-PAC 6 Clicks Score (PT): 13  AM-PAC 6 Clicks Score (OT): 19    Carlos Duvall PT  2023      Electronically signed by Carlos Duvall PT at 23 1414          Occupational Therapy Notes (most recent note)      Katelynn Fernández, OT at 23 1447          Acute Care - Occupational Therapy Treatment Note  [facility]     Patient Name: Tre Kinney  : 1932  MRN: [id number]  Today's Date: 2023  Onset of Illness/Injury or Date of Surgery: 10/06/22     Referring Physician: Janneth    Admit Date: 10/5/2022     No diagnosis found.  Patient Active Problem List   Diagnosis   • Acid reflux   • Ankle arthralgia   • Cardiac conduction disorder   • Closed fracture of distal fibula   • Arteriosclerosis of coronary artery   • Fracture of distal end of tibia   • Elevated cholesterol   • BP (high blood pressure)   • Hypertrophic polyarthritis   • OP (osteoporosis)   • Right heart failure (HCC)   • Heart failure, chronic, right-sided (HCC)   • SOB (shortness of breath)   • Palpitations   • CHF (congestive heart failure) (HCC)   • Congestive heart failure (HCC)   • Precordial pain   • Chronic anticoagulation   • Hypokalemia   • Anemia, unspecified type   • Generalized weakness     Past Medical History:   Diagnosis Date   • Anemia    • Arthritis    • CHF (congestive heart failure) ([facility])    • Chronic kidney disease    • Coronary artery disease    • H/O blood clots    • History of transfusion     no reaction    • Hypertension      Past Surgical  History:   Procedure Laterality Date   • ABDOMINAL SURGERY     • APPENDECTOMY     • CARDIAC SURGERY     •  SECTION     • COLONOSCOPY N/A 3/7/2018    Procedure: COLONOSCOPY;  Surgeon: Willie Gresham MD;  Location:  [facility];  Service:    • CORONARY ANGIOPLASTY WITH STENT PLACEMENT     • ENDOSCOPY N/A 3/6/2018    Procedure: ESOPHAGOGASTRODUODENOSCOPY;  Surgeon: Willie Gresham MD;  Location:  [facility];  Service:    • PACEMAKER IMPLANTATION      left side    • SKIN BIOPSY     • TUBAL ABDOMINAL LIGATION           OT ASSESSMENT FLOWSHEET (last 12 hours)     OT Evaluation and Treatment     Row Name 23 1444                   OT Time and Intention    Subjective Information no complaints  -HB        Document Type therapy note (daily note)  -HB        Mode of Treatment individual therapy;occupational therapy  -HB        Total Minutes, Occupational Therapy 15  -HB        Patient Effort good  -HB           General Information    Patient Profile Reviewed yes  -HB        Patient/Family/Caregiver Comments/Observations Patient and RN okd OT this date.  -HB        General Observations of Patient Patient tolerated OT well with no adverse reactions.  -HB           Cognition    Affect/Mental Status (Cognition) WFL  -HB        Orientation Status (Cognition) oriented x 4  -HB        Follows Commands (Cognition) WFL  -HB           Self-Feeding Assessment/Training    Wolfe Level (Feeding) feeding skills;liquids to mouth;finger foods;set up  -HB           Motor Skills    Motor Skills motor control/coordination interventions;functional endurance  -HB        Motor Control/Coordination Interventions therapeutic exercise/ROM  -HB        Therapeutic Exercise shoulder;elbow/forearm;wrist;hand  -HB        Additional Documentation --  flex bar; overhead press/chest press 2 sets 10x; rest between  -HB           Positioning and Restraints    Pre-Treatment Position sitting in chair/recliner  -HB        Post  Treatment Position wheelchair  -HB        In Bed call light within reach;encouraged to call for assist  -HB              User Key  (r) = Recorded By, (t) = Taken By, (c) = Cosigned By    Initials Name Effective Dates    Katelynn Gan, VELASQUEZ 05/25/21 -                        OT Recommendation and Plan           Outcome Measures     Row Name 01/07/23 1400 01/06/23 1500 01/05/23 1408       How much help from another is currently needed...    Putting on and taking off regular lower body clothing? 3  -HB 3  -LM 3  -LM    Bathing (including washing, rinsing, and drying) 3  -HB 3  -LM 3  -LM    Toileting (which includes using toilet bed pan or urinal) 3  -HB 3  -LM 3  -LM    Putting on and taking off regular upper body clothing 3  -HB 3  -LM 3  -LM    Taking care of personal grooming (such as brushing teeth) 3  -HB 3  -LM 3  -LM    Eating meals 4  -HB 4  -LM 4  -LM    AM-PAC 6 Clicks Score (OT) 19  -HB 19  -LM 19  -LM       Functional Assessment    Outcome Measure Options -- AM-PAC 6 Clicks Daily Activity (OT)  -LM AM-PAC 6 Clicks Daily Activity (OT)  -LM    Row Name 01/04/23 1500             How much help from another is currently needed...    Putting on and taking off regular lower body clothing? 3  -LM      Bathing (including washing, rinsing, and drying) 3  -LM      Toileting (which includes using toilet bed pan or urinal) 3  -LM      Putting on and taking off regular upper body clothing 3  -LM      Taking care of personal grooming (such as brushing teeth) 3  -LM      Eating meals 4  -LM      AM-PAC 6 Clicks Score (OT) 19  -LM         Functional Assessment    Outcome Measure Options AM-PAC 6 Clicks Daily Activity (OT)  -LM            User Key  (r) = Recorded By, (t) = Taken By, (c) = Cosigned By    Initials Name Provider Type    LM Татьяна Bah, OT Occupational Therapist    Katelynn Gan, OT Occupational Therapist                Time Calculation:    Time Calculation- OT     Row Name 01/07/23 1447 01/07/23 1413           Time Calculation- OT    OT Start Time 1408  -HB --     OT Stop Time 1423  -HB --     OT Time Calculation (min) 15 min  -HB --     Total Timed Code Minutes- OT 15 minute(s)  -HB --        Timed Charges    01904 - Gait Training Minutes  -- 15  -CS        Total Minutes    Timed Charges Total Minutes -- 15  -CS      Total Minutes -- 15  -CS           User Key  (r) = Recorded By, (t) = Taken By, (c) = Cosigned By    Initials Name Provider Type    Carlos Victoria, PT Physical Therapist     Katelynn Fernández OT Occupational Therapist              Therapy Charges for Today     Code Description Service Date Service Provider Modifiers Qty    96070910087 HC OT THERAPEUTIC ACT EA 15 MIN 1/7/2023 Katelynn Fernández OT GO 1               Katelynn Fernández OT  1/7/2023    Electronically signed by Katelynn Fernández OT at 01/07/23 1448                                                                                                                Discharge Planning Assessment   [place]     Patient Name: Tre Kinney                    MRN: [id number]  Today's Date: 1/6/2023                       Admit Date: 10/5/2022     Plan: Pt was admitted to swing bed on 10/5/22.  was notified that Pt was approved for additional swing bed days through 1/10/23 with clinical updates due 1/9/23. SS to continue to follow and assist as needed.            Discharge Plan      Row Name 01/06/23 1356           Plan     Plan Pt was admitted to swing bed on 10/5/22.  was notified that Pt was approved for additional swing bed days through 1/10/23 with clinical updates due 1/9/23. SS to continue to follow and assist as needed.                 XANDER Craig

## 2023-01-09 NOTE — PLAN OF CARE
Goal Outcome Evaluation:  Plan of Care Reviewed With: patient        Progress: no change     Pt was up in chair at the beginning of shift, transferred to bed with no problems. No acute changes noted, administered PRN tylenol for back ache, which was effective. Will continue to follow current POC.

## 2023-01-09 NOTE — THERAPY TREATMENT NOTE
Acute Care - Physical Therapy Treatment Note   [place]ot     Patient Name: Tre Kinney  : 1932  MRN: [id number]  Today's Date: 2023   Onset of Illness/Injury or Date of Surgery: 10/06/22  Visit Dx:   No diagnosis found.  Patient Active Problem List   Diagnosis   • Acid reflux   • Ankle arthralgia   • Cardiac conduction disorder   • Closed fracture of distal fibula   • Arteriosclerosis of coronary artery   • Fracture of distal end of tibia   • Elevated cholesterol   • BP (high blood pressure)   • Hypertrophic polyarthritis   • OP (osteoporosis)   • Right heart failure (HCC)   • Heart failure, chronic, right-sided (HCC)   • SOB (shortness of breath)   • Palpitations   • CHF (congestive heart failure) (HCC)   • Congestive heart failure (HCC)   • Precordial pain   • Chronic anticoagulation   • Hypokalemia   • Anemia, unspecified type   • Generalized weakness     Past Medical History:   Diagnosis Date   • Anemia    • Arthritis    • CHF (congestive heart failure) (HCC)    • Chronic kidney disease    • Coronary artery disease    • H/O blood clots    • History of transfusion     no reaction    • Hypertension      Past Surgical History:   Procedure Laterality Date   • ABDOMINAL SURGERY     • APPENDECTOMY     • CARDIAC SURGERY     •  SECTION     • COLONOSCOPY N/A 3/7/2018    Procedure: COLONOSCOPY;  Surgeon: Willie Gresham MD;  Location:  [facility];  Service:    • CORONARY ANGIOPLASTY WITH STENT PLACEMENT     • ENDOSCOPY N/A 3/6/2018    Procedure: ESOPHAGOGASTRODUODENOSCOPY;  Surgeon: Willie Gresham MD;  Location:  [facility];  Service:    • PACEMAKER IMPLANTATION      left side    • SKIN BIOPSY     • TUBAL ABDOMINAL LIGATION       PT Assessment (last 12 hours)     PT Evaluation and Treatment     Row Name 23 1625          Physical Therapy Time and Intention    Subjective Information no complaints  -CT     Document Type therapy note (daily note)  -CT     Mode of Treatment physical  therapy;individual therapy  -CT     Patient Effort good  -CT     Comment Pts treatment session [id number]. No answer from pts son Willian on facetime. Pt ambualted very well this date 2 trials. Pt performed seated ther ex, standing ther ex, and dynamic balance for toileting this date.  -CT     Row Name 01/09/23 1625          General Information    Patient Profile Reviewed yes  -CT     Row Name 01/09/23 1625          Cognition    Affect/Mental Status (Cognition) WFL  -CT     Orientation Status (Cognition) oriented x 4  -CT     Follows Commands (Cognition) WFL  -CT     Row Name 01/09/23 1625          Transfers    Transfers sit-stand transfer;stand-sit transfer  -CT     Row Name 01/09/23 1625          Sit-Stand Transfer    Sit-Stand [place] (Transfers) contact guard  -CT     Assistive Device (Sit-Stand Transfers) walker, front-wheeled  -CT     Row Name 01/09/23 1625          Stand-Sit Transfer    Stand-Sit [place] (Transfers) contact guard  -CT     Assistive Device (Stand-Sit Transfers) walker, front-wheeled  -CT     Row Name 01/09/23 1625          Gait/Stairs (Locomotion)    [place] Level (Gait) contact guard  -CT     Assistive Device (Gait) walker, front-wheeled  -CT     Distance in Feet (Gait) 25, 18  -CT     Pattern (Gait) step-to  -CT     Deviations/Abnormal Patterns (Gait) stride length decreased;gait speed decreased;festinating/shuffling;weight shifting decreased  -CT     Bilateral Gait Deviations weight shift ability decreased;forward flexed posture  -CT     Row Name 01/09/23 1625          Balance    Balance Assessment standing dynamic balance  -CT     Dynamic Standing Balance contact guard;minimal assist  -CT     Position/Device Used, Standing Balance walker, front-wheeled  -CT     Balance Interventions standing;supported;moderate challenge;occupation based/functional task;weight shifting activity;UE activity with balance activity  during toileting and dressing  -CT     Row Name 01/09/23 1628           Motor Skills    Therapeutic Exercise --  BLE seated ther ex: seated marches, LAQ, AP, hip abd/add, glut sets; standing: standing marches, heel raises, mini squats  -CT     Row Name 01/09/23 1625          Coping    Observed Emotional State calm;cooperative;pleasant  -CT     Verbalized Emotional State acceptance  -CT     Row Name 01/09/23 1625          Plan of Care Review    Plan of Care Reviewed With patient  -CT     Row Name 01/09/23 1625          Positioning and Restraints    Pre-Treatment Position sitting in chair/recliner  -CT     Post Treatment Position chair  -CT     In Chair sitting;call light within reach;encouraged to call for assist  -CT           User Key  (r) = Recorded By, (t) = Taken By, (c) = Cosigned By    Initials Name Provider Type    CT Bhargavi Vásquez PT Physical Therapist                Physical Therapy Education     Title: PT OT SLP Therapies (Done)     Topic: Physical Therapy (Done)     Point: Mobility training (Done)     Learning Progress Summary           Patient Acceptance, E,TB, VU by KK at 1/6/2023 1225    Acceptance, E, VU by CL at 1/5/2023 1414    Acceptance, E,TB, DU,VU by BD at 1/3/2023 0838    Acceptance, TB,E, VU,DU by BD at 1/2/2023 0907    Acceptance, TB,E, VU,DU by BD at 1/1/2023 0846    Acceptance, E,TB, VU by RM at 12/30/2022 1035    Acceptance, E,TB, VU by AC at 12/28/2022 1505    Acceptance, E,TB, VU by AC at 12/27/2022 1513    Acceptance, E, VU by CL at 12/26/2022 1325    Acceptance, E, VU by CL at 12/22/2022 1826    Acceptance, E, VU by CL at 12/21/2022 1303    Acceptance, E, VU by EB at 12/20/2022 1644    Acceptance, E, VU by CL at 12/17/2022 1703    Acceptance, E,D, VU,NR by LL at 12/16/2022 1605    Acceptance, E,TB, VU by AC at 12/16/2022 1523    Acceptance, E,TB, VU by AC at 12/15/2022 1618    Acceptance, E,TB, VU by AC at 12/14/2022 1503    Acceptance, E,TB, VU by DL at 12/13/2022 0240    Acceptance, D,E, VU,NR by LL at 12/12/2022 1455    Acceptance, E,TB, VU by RW  at 12/9/2022 1451    Acceptance, E,TB, VU by BC at 11/27/2022 1319    Acceptance, E,TB, VU by RM at 11/20/2022 1449    Acceptance, E,TB, VU by RM at 11/18/2022 1021    Acceptance, E,TB, VU by RH at 11/16/2022 1110    Acceptance, E,TB, VU by KK at 11/14/2022 1638    Acceptance, E,TB, VU by KK at 11/13/2022 1648    Acceptance, E,TB, VU by KG at 11/10/2022 1827    Acceptance, E,TB, VU by DM at 11/9/2022 1638    Acceptance, E,TB, VU by KK at 11/8/2022 1754    Acceptance, E,TB, VU by KK at 11/7/2022 1635    Acceptance, E,D, VU,NR by LL at 11/5/2022 1325    Acceptance, E, VU by EB at 11/4/2022 1448    Acceptance, E,TB, VU by KM at 11/4/2022 1101   Family Acceptance, D,E, VU,NR by LL at 12/12/2022 1455                   Point: Home exercise program (Done)     Learning Progress Summary           Patient Acceptance, E,TB, VU by KK at 1/6/2023 1225    Acceptance, E, VU by CL at 1/5/2023 1414    Acceptance, E,TB, DU,VU by BD at 1/3/2023 0838    Acceptance, TB,E, VU,DU by BD at 1/2/2023 0907    Acceptance, TB,E, VU,DU by BD at 1/1/2023 0846    Acceptance, E,TB, VU by RM at 12/30/2022 1035    Acceptance, E,TB, VU by AC at 12/28/2022 1505    Acceptance, E,TB, VU by AC at 12/27/2022 1513    Acceptance, E, VU by CL at 12/26/2022 1325    Acceptance, E, VU by CL at 12/22/2022 1826    Acceptance, E, VU by CL at 12/21/2022 1303    Acceptance, E, VU by EB at 12/20/2022 1644    Acceptance, E, VU by CL at 12/17/2022 1703    Acceptance, E,D, VU,NR by LL at 12/16/2022 1605    Acceptance, E,TB, VU by AC at 12/16/2022 1523    Acceptance, E,TB, VU by AC at 12/15/2022 1618    Acceptance, E,TB, VU by AC at 12/14/2022 1503    Acceptance, E,TB, VU by DL at 12/13/2022 0240    Acceptance, D,E, VU,NR by LL at 12/12/2022 1455    Acceptance, E,TB, VU by RW at 12/9/2022 1451    Acceptance, E,TB, VU by BC at 11/27/2022 1319    Acceptance, E,TB, VU by RM at 11/20/2022 1449    Acceptance, E,TB, VU by RM at 11/18/2022 1021    Acceptance, E,TB, VU by RH at  11/16/2022 1110    Acceptance, E,TB, VU by KK at 11/14/2022 1638    Acceptance, E,TB, VU by KK at 11/13/2022 1648    Acceptance, E,TB, VU by KG at 11/10/2022 1827    Acceptance, E,TB, VU by DM at 11/9/2022 1638    Acceptance, E,TB, VU by KK at 11/8/2022 1754    Acceptance, E,TB, VU by KK at 11/7/2022 1635    Acceptance, E,D, VU,NR by LL at 11/5/2022 1325    Acceptance, E, VU by EB at 11/4/2022 1448    Acceptance, E,TB, VU by KM at 11/4/2022 1101   Family Acceptance, D,E, VU,NR by LL at 12/12/2022 1455                   Point: Body mechanics (Done)     Learning Progress Summary           Patient Acceptance, E,TB, VU by KK at 1/6/2023 1225    Acceptance, E, VU by CL at 1/5/2023 1414    Acceptance, E,TB, DU,VU by BD at 1/3/2023 0838    Acceptance, TB,E, VU,DU by BD at 1/2/2023 0907    Acceptance, TB,E, VU,DU by BD at 1/1/2023 0846    Acceptance, E,TB, VU by RM at 12/30/2022 1035    Acceptance, E,TB, VU by AC at 12/28/2022 1505    Acceptance, E,TB, VU by AC at 12/27/2022 1513    Acceptance, E, VU by CL at 12/26/2022 1325    Acceptance, E, VU by CL at 12/22/2022 1826    Acceptance, E, VU by CL at 12/21/2022 1303    Acceptance, E, VU by EB at 12/20/2022 1644    Acceptance, E, VU by CL at 12/17/2022 1703    Acceptance, E,D, VU,NR by LL at 12/16/2022 1605    Acceptance, E,TB, VU by AC at 12/16/2022 1523    Acceptance, E,TB, VU by AC at 12/15/2022 1618    Acceptance, E,TB, VU by AC at 12/14/2022 1503    Acceptance, E,TB, VU by DL at 12/13/2022 0240    Acceptance, D,E, VU,NR by LL at 12/12/2022 1455    Acceptance, E,TB, VU by RW at 12/9/2022 1451    Acceptance, E,TB, VU by BC at 11/27/2022 1319    Acceptance, E,TB, VU by RM at 11/20/2022 1449    Acceptance, E,TB, VU by RM at 11/18/2022 1021    Acceptance, E,TB, VU by RH at 11/16/2022 1110    Acceptance, E,TB, VU by KK at 11/14/2022 1638    Acceptance, E,TB, VU by KK at 11/13/2022 1648    Acceptance, E,TB, VU by KG at 11/10/2022 1827    Acceptance, E,TB, VU by DM at 11/9/2022  1638    Acceptance, E,TB, VU by KK at 11/8/2022 1754    Acceptance, E,TB, VU by KK at 11/7/2022 1635    Acceptance, E,D, VU,NR by LL at 11/5/2022 1325    Acceptance, E, VU by EB at 11/4/2022 1448    Acceptance, E,TB, VU by KM at 11/4/2022 1101   Family Acceptance, D,E, VU,NR by LL at 12/12/2022 1455                   Point: Precautions (Done)     Learning Progress Summary           Patient Acceptance, E,TB, VU by KK at 1/6/2023 1225    Acceptance, E, VU by CL at 1/5/2023 1414    Acceptance, E,TB, DU,VU by BD at 1/3/2023 0838    Acceptance, TB,E, VU,DU by BD at 1/2/2023 0907    Acceptance, TB,E, VU,DU by BD at 1/1/2023 0846    Acceptance, E,TB, VU by RM at 12/30/2022 1035    Acceptance, E,TB, VU by AC at 12/28/2022 1505    Acceptance, E,TB, VU by AC at 12/27/2022 1513    Acceptance, E, VU by CL at 12/26/2022 1325    Acceptance, E, VU by CL at 12/22/2022 1826    Acceptance, E, VU by CL at 12/21/2022 1303    Acceptance, E, VU by EB at 12/20/2022 1644    Acceptance, E, VU by CL at 12/17/2022 1703    Acceptance, E,D, VU,NR by LL at 12/16/2022 1605    Acceptance, E,TB, VU by AC at 12/16/2022 1523    Acceptance, E,TB, VU by AC at 12/15/2022 1618    Acceptance, E,TB, VU by AC at 12/14/2022 1503    Acceptance, E,TB, VU by DL at 12/13/2022 0240    Acceptance, D,E, VU,NR by LL at 12/12/2022 1455    Acceptance, E,TB, VU by RW at 12/9/2022 1451    Acceptance, E,TB, VU by BC at 11/27/2022 1319    Acceptance, E,TB, VU by RM at 11/20/2022 1449    Acceptance, E,TB, VU by RM at 11/18/2022 1021    Acceptance, E,TB, VU by RH at 11/16/2022 1110    Acceptance, E,TB, VU by KK at 11/14/2022 1638    Acceptance, E,TB, VU by KK at 11/13/2022 1648    Acceptance, E,TB, VU by KG at 11/10/2022 1827    Acceptance, E,TB, VU by DM at 11/9/2022 1638    Acceptance, E,TB, VU by KK at 11/8/2022 1754    Acceptance, E,TB, VU by KK at 11/7/2022 1635    Acceptance, E,D, VU,NR by LL at 11/5/2022 1325    Acceptance, E, VU by EB at 11/4/2022 1448     Acceptance, E,TB, VU by KM at 11/4/2022 1101   Family Acceptance, D,E, VU,NR by LL at 12/12/2022 1455                               User Key     Initials Effective Dates Name Provider Type Discipline    CL 06/16/21 -  Mitzy Mustafa, RN Registered Nurse Nurse    LL 05/02/16 -  Neda Garcia, NACHO Physical Therapist Assistant PT    RM 06/14/22 -  Bethany Krause, RN Registered Nurse Nurse    DM 06/16/21 -  Moniz, Deborah, RN Registered Nurse Nurse    BC 08/02/22 -  Collett, Brittany, RN Registered Nurse Nurse    AC 10/13/22 -  Jaya Castillo, RN Registered Nurse Nurse    BD 08/05/21 -  Radha Lynn, RN Registered Nurse Nurse    KM 05/24/22 -  Mao Vázquez, PT Physical Therapist PT    EB 09/22/22 -  Cedric Oliver, RN Registered Nurse Nurse    KK 06/27/22 -  Chelsi Bolivar, RN Registered Nurse Nurse    DL 03/16/22 -  Francy Garcia, RN Registered Nurse Nurse    KG 09/22/22 -  Verónica Rodriguez, RN Registered Nurse Nurse    RW 09/22/22 -  Bethany Schofield, RN Registered Nurse Nurse    RH 08/03/22 - 12/21/22 Jung Russell, SONAM Extern Registered Nurse Nurse              PT Recommendation and Plan  Anticipated Discharge Disposition (PT): home with 24/7 care, skilled nursing [facility]  Planned Therapy Interventions (PT): balance training, bed mobility training, gait training, home exercise program, manual therapy techniques, motor coordination training, neuromuscular re-education, patient/family education, postural re-education, strengthening, transfer training  Therapy Frequency (PT): 5 times/wk  Progress Summary (PT)  Progress Toward Functional Goals (PT): progress toward functional goals is good, prepare for discharge  Plan of Care Reviewed With: patient  Progress: improving   Outcome Measures     Row Name 01/07/23 1400             How much help from another is currently needed...    Putting on and taking off regular lower body clothing? 3  -HB      Bathing (including washing, rinsing, and drying) 3  -HB       Toileting (which includes using toilet bed pan or urinal) 3  -HB      Putting on and taking off regular upper body clothing 3  -HB      Taking care of personal grooming (such as brushing teeth) 3  -HB      Eating meals 4  -HB      AM-PAC 6 Clicks Score (OT) 19  -HB            User Key  (r) = Recorded By, (t) = Taken By, (c) = Cosigned By    Initials Name Provider Type    HB Katelynn Fernández, OT Occupational Therapist                 Time Calculation:    PT Charges     Row Name 01/09/23 1629             Time Calculation    Start Time 1310  -CT      Stop Time 1350  -CT      Time Calculation (min) 40 min  -CT      PT Received On 01/09/23  -CT            User Key  (r) = Recorded By, (t) = Taken By, (c) = Cosigned By    Initials Name Provider Type    CT Bhargavi Vásquez, PT Physical Therapist              Therapy Charges for Today     Code Description Service Date Service Provider Modifiers Qty    67768558657 HC GAIT TRAINING EA 15 MIN 1/9/2023 Bhargavi Vásquez, PT GP 1    36722001195 HC PT THERAPEUTIC ACT EA 15 MIN 1/9/2023 Bhargavi Vásquez, PT GP 1    52398635725 HC PT THER PROC EA 15 MIN 1/9/2023 Bhargavi Vásquez, PT GP 1          PT G-Codes  Outcome Measure Options: AM-PAC 6 Clicks Daily Activity (OT)  AM-PAC 6 Clicks Score (PT): 15  AM-PAC 6 Clicks Score (OT): 19    Bhargavi Vásquez PT  1/9/2023

## 2023-01-09 NOTE — PLAN OF CARE
Goal Outcome Evaluation:  Plan of Care Reviewed With: patient        Progress: improving  Outcome Evaluation: Pt sat in bedside chair this shift. Pt ambulated to BSC, tolerated well. No complaints or acute changes noted at this time. VSS. Will continue to follow plan of care.

## 2023-01-10 PROCEDURE — 97530 THERAPEUTIC ACTIVITIES: CPT

## 2023-01-10 PROCEDURE — 97116 GAIT TRAINING THERAPY: CPT

## 2023-01-10 PROCEDURE — 97110 THERAPEUTIC EXERCISES: CPT

## 2023-01-10 RX ADMIN — DOCUSATE SODIUM 100 MG: 100 CAPSULE ORAL at 08:11

## 2023-01-10 RX ADMIN — APIXABAN 5 MG: 5 TABLET, FILM COATED ORAL at 20:11

## 2023-01-10 RX ADMIN — MAGNESIUM GLUCONATE 500 MG ORAL TABLET 400 MG: 500 TABLET ORAL at 08:11

## 2023-01-10 RX ADMIN — METOPROLOL TARTRATE 25 MG: 25 TABLET, FILM COATED ORAL at 08:11

## 2023-01-10 RX ADMIN — GABAPENTIN 300 MG: 300 CAPSULE ORAL at 20:11

## 2023-01-10 RX ADMIN — DICLOFENAC 4 G: 10 GEL TOPICAL at 08:11

## 2023-01-10 RX ADMIN — METOPROLOL TARTRATE 25 MG: 25 TABLET, FILM COATED ORAL at 20:11

## 2023-01-10 RX ADMIN — PANTOPRAZOLE SODIUM 40 MG: 40 TABLET, DELAYED RELEASE ORAL at 06:51

## 2023-01-10 RX ADMIN — DICLOFENAC 4 G: 10 GEL TOPICAL at 20:11

## 2023-01-10 RX ADMIN — FUROSEMIDE 40 MG: 40 TABLET ORAL at 17:49

## 2023-01-10 RX ADMIN — Medication 1 TABLET: at 08:11

## 2023-01-10 RX ADMIN — FUROSEMIDE 40 MG: 40 TABLET ORAL at 08:11

## 2023-01-10 RX ADMIN — DICLOFENAC 4 G: 10 GEL TOPICAL at 11:19

## 2023-01-10 RX ADMIN — APIXABAN 5 MG: 5 TABLET, FILM COATED ORAL at 08:11

## 2023-01-10 RX ADMIN — ISOSORBIDE MONONITRATE 30 MG: 30 TABLET, EXTENDED RELEASE ORAL at 08:11

## 2023-01-10 RX ADMIN — DICLOFENAC 4 G: 10 GEL TOPICAL at 17:48

## 2023-01-10 RX ADMIN — ESCITALOPRAM 10 MG: 10 TABLET, FILM COATED ORAL at 08:11

## 2023-01-10 RX ADMIN — TAMSULOSIN HYDROCHLORIDE 0.4 MG: 0.4 CAPSULE ORAL at 08:11

## 2023-01-10 RX ADMIN — Medication 10 MG: at 20:11

## 2023-01-10 NOTE — PLAN OF CARE
Goal Outcome Evaluation:  Plan of Care Reviewed With: patient        Progress: improving  No complaints today, smiling and talkative.  Sat up in chair most of day.  Continue plan of care.

## 2023-01-10 NOTE — PROGRESS NOTES
Ms. Kinney is our 89 yo F with hx CHF, chronic kidney disease, coronary artery disease s/p stenting, history of blood clots, chronic anticoagulation with Coumadin, SSS s/p pacemaker placement, chronic diastolic CHF, and iron deficiency anemia who initially presented here after a fall. Patient found to have traumatic hematoma of right hip. Patient found to be anemic requiring 3 units of pRBCs. INR was high on presentation and reversed. Patient had DIANA that stabilized. Patient with chronic vertebral and rib fractures that were evaluated by orthopedic surgery. Patient also had volume overolad requiring diuresis during admission. Patient now admitted to swing bed for debility. Apixiban has been restarted, patient tolerating with stable hemoglobin on 1/8 of 10.2. Chart reviewed including nursing notes. Patient evaluated bedside. She had two loose BM but denied any other complaints. Patient worked with therapy yesterday. Patient has not had repeat chest pain. We continue to await repeat swing bed evaluation to determine options moving forword. PT/OT/SW following.

## 2023-01-10 NOTE — CASE MANAGEMENT/SOCIAL WORK
Discharge Planning Assessment   Scot     Patient Name: Ter Kinney  MRN: [id number]  Today's Date: 1/10/2023    Admit Date: 10/5/2022    Plan: Pt was admitted to swing bed on 10/5/22. SS was notified by swing bed Rn who states clinical updates were submitted yesterday 1/9/23 and are still under review. SS to follow and assist as needed.     Discharge Plan     Row Name 01/10/23 1032       Plan    Plan Pt was admitted to swing bed on 10/5/22. SS was notified by swing bed Rn who states clinical updates were submitted yesterday 1/9/23 and are still under review. SS to follow and assist as needed.                XANDER Craig

## 2023-01-10 NOTE — PROGRESS NOTES
Nutrition Services    Patient Name:  Tre Kinney  YOB: 1932  MRN: [id number]  Admit Date:  10/5/2022    BMI: 26.34  Diet: Regular  Intake: 69% x 12 meals  Fluids: not meeting needs      Electronically signed by:  Jose Oneal RD  01/10/23 08:24 EST

## 2023-01-10 NOTE — PLAN OF CARE
Goal Outcome Evaluation:  Plan of Care Reviewed With: patient        Progress: improving  Outcome Evaluation: no complaints or acute changes noted. will continue with poc

## 2023-01-10 NOTE — THERAPY TREATMENT NOTE
Acute Care - Physical Therapy Treatment Note   [place]     Patient Name: Tre Kinney  : 1932  MRN: [id number]  Today's Date: 1/10/2023   Onset of Illness/Injury or Date of Surgery: 10/06/22  Visit Dx:   No diagnosis found.  Patient Active Problem List   Diagnosis   • Acid reflux   • Ankle arthralgia   • Cardiac conduction disorder   • Closed fracture of distal fibula   • Arteriosclerosis of coronary artery   • Fracture of distal end of tibia   • Elevated cholesterol   • BP (high blood pressure)   • Hypertrophic polyarthritis   • OP (osteoporosis)   • Right heart failure (HCC)   • Heart failure, chronic, right-sided (HCC)   • SOB (shortness of breath)   • Palpitations   • CHF (congestive heart failure) (HCC)   • Congestive heart failure (HCC)   • Precordial pain   • Chronic anticoagulation   • Hypokalemia   • Anemia, unspecified type   • Generalized weakness     Past Medical History:   Diagnosis Date   • Anemia    • Arthritis    • CHF (congestive heart failure) (HCC)    • Chronic kidney disease    • Coronary artery disease    • H/O blood clots    • History of transfusion     no reaction    • Hypertension      Past Surgical History:   Procedure Laterality Date   • ABDOMINAL SURGERY     • APPENDECTOMY     • CARDIAC SURGERY     •  SECTION     • COLONOSCOPY N/A 3/7/2018    Procedure: COLONOSCOPY;  Surgeon: Willie Gresham MD;  Location:  [facility];  Service:    • CORONARY ANGIOPLASTY WITH STENT PLACEMENT     • ENDOSCOPY N/A 3/6/2018    Procedure: ESOPHAGOGASTRODUODENOSCOPY;  Surgeon: Willie Gresham MD;  Location:  [facility];  Service:    • PACEMAKER IMPLANTATION      left side    • SKIN BIOPSY     • TUBAL ABDOMINAL LIGATION       PT Assessment (last 12 hours)     PT Evaluation and Treatment     Row Name 01/10/23 1427          Physical Therapy Time and Intention    Subjective Information no complaints  -CT     Document Type therapy note (daily note)  -CT     Mode of Treatment physical  therapy;individual therapy  -CT     Patient Effort good  -CT     Comment Pts treatment session [id number]. Pelon Dorsey via [employer]. Pt ambulated x 2 and performed standing and seated ther ex.  -CT     Row Name 01/10/23 1427          General Information    Patient Profile Reviewed yes  -CT     Row Name 01/10/23 1427          Cognition    Affect/Mental Status (Cognition) WFL  -CT     Orientation Status (Cognition) oriented x 4  -CT     Follows Commands (Cognition) WFL  -CT     Row Name 01/10/23 1427          Bed Mobility    Comment, (Bed Mobility) pt up in bedside chair  -CT     Row Name 01/10/23 1427          Transfers    Transfers sit-stand transfer;stand-sit transfer  -CT     Row Name 01/10/23 1427          Sit-Stand Transfer    Sit-Stand [place] (Transfers) contact guard  -CT     Assistive Device (Sit-Stand Transfers) walker, front-wheeled  -CT     Row Name 01/10/23 1427          Stand-Sit Transfer    Stand-Sit [place] (Transfers) contact guard  -CT     Assistive Device (Stand-Sit Transfers) walker, front-wheeled  -CT     Row Name 01/10/23 1427          Gait/Stairs (Locomotion)    [place] Level (Gait) contact guard  -CT     Assistive Device (Gait) walker, front-wheeled  -CT     Distance in Feet (Gait) 50 ft x 2  -CT     Pattern (Gait) step-to  -CT     Deviations/Abnormal Patterns (Gait) stride length decreased;gait speed decreased;festinating/shuffling;weight shifting decreased  -CT     Bilateral Gait Deviations weight shift ability decreased;forward flexed posture  -CT     Row Name 01/10/23 1427          Motor Skills    Therapeutic Exercise --  BLE seated ther ex: seated marches, LAQ, AP, hip abd/add, glut sets; standing: standing marches, heel raises, mini squats  -CT     Row Name 01/10/23 1427          Coping    Observed Emotional State calm;cooperative;pleasant  -CT     Verbalized Emotional State acceptance  -CT     Row Name 01/10/23 1427          Plan of Care Review    Plan of Care Reviewed With  patient  -CT     Row Name 01/10/23 1427          Positioning and Restraints    Pre-Treatment Position sitting in chair/recliner  -CT     Post Treatment Position chair  -CT     In Chair sitting;call light within reach;encouraged to call for assist  -CT           User Key  (r) = Recorded By, (t) = Taken By, (c) = Cosigned By    Initials Name Provider Type    CT Bhargavi Vásquez PT Physical Therapist                Physical Therapy Education     Title: PT OT SLP Therapies (Done)     Topic: Physical Therapy (Done)     Point: Mobility training (Done)     Learning Progress Summary           Patient Acceptance, E,TB, VU by KK at 1/6/2023 1225    Acceptance, E, VU by CL at 1/5/2023 1414    Acceptance, E,TB, DU,VU by BD at 1/3/2023 0838    Acceptance, TB,E, VU,DU by BD at 1/2/2023 0907    Acceptance, TB,E, VU,DU by BD at 1/1/2023 0846    Acceptance, E,TB, VU by RM at 12/30/2022 1035    Acceptance, E,TB, VU by AC at 12/28/2022 1505    Acceptance, E,TB, VU by AC at 12/27/2022 1513    Acceptance, E, VU by CL at 12/26/2022 1325    Acceptance, E, VU by CL at 12/22/2022 1826    Acceptance, E, VU by CL at 12/21/2022 1303    Acceptance, E, VU by EB at 12/20/2022 1644    Acceptance, E, VU by CL at 12/17/2022 1703    Acceptance, E,D, VU,NR by LL at 12/16/2022 1605    Acceptance, E,TB, VU by AC at 12/16/2022 1523    Acceptance, E,TB, VU by AC at 12/15/2022 1618    Acceptance, E,TB, VU by AC at 12/14/2022 1503    Acceptance, E,TB, VU by DL at 12/13/2022 0240    Acceptance, D,E, VU,NR by LL at 12/12/2022 1455    Acceptance, E,TB, VU by RW at 12/9/2022 1451    Acceptance, E,TB, VU by BC at 11/27/2022 1319    Acceptance, E,TB, VU by RM at 11/20/2022 1449    Acceptance, E,TB, VU by RM at 11/18/2022 1021    Acceptance, E,TB, VU by RH at 11/16/2022 1110    Acceptance, E,TB, VU by KK at 11/14/2022 1638    Acceptance, E,TB, VU by KK at 11/13/2022 1648    Acceptance, E,TB, VU by KG at 11/10/2022 1827    Acceptance, E,TB, VU by DM at 11/9/2022  1638    Acceptance, E,TB, VU by KK at 11/8/2022 1754    Acceptance, E,TB, VU by KK at 11/7/2022 1635    Acceptance, E,D, VU,NR by LL at 11/5/2022 1325    Acceptance, E, VU by EB at 11/4/2022 1448    Acceptance, E,TB, VU by KM at 11/4/2022 1101   Family Acceptance, D,E, VU,NR by LL at 12/12/2022 1455                   Point: Home exercise program (Done)     Learning Progress Summary           Patient Acceptance, E,TB, VU by KK at 1/6/2023 1225    Acceptance, E, VU by CL at 1/5/2023 1414    Acceptance, E,TB, DU,VU by BD at 1/3/2023 0838    Acceptance, TB,E, VU,DU by BD at 1/2/2023 0907    Acceptance, TB,E, VU,DU by BD at 1/1/2023 0846    Acceptance, E,TB, VU by RM at 12/30/2022 1035    Acceptance, E,TB, VU by AC at 12/28/2022 1505    Acceptance, E,TB, VU by AC at 12/27/2022 1513    Acceptance, E, VU by CL at 12/26/2022 1325    Acceptance, E, VU by CL at 12/22/2022 1826    Acceptance, E, VU by CL at 12/21/2022 1303    Acceptance, E, VU by EB at 12/20/2022 1644    Acceptance, E, VU by CL at 12/17/2022 1703    Acceptance, E,D, VU,NR by LL at 12/16/2022 1605    Acceptance, E,TB, VU by AC at 12/16/2022 1523    Acceptance, E,TB, VU by AC at 12/15/2022 1618    Acceptance, E,TB, VU by AC at 12/14/2022 1503    Acceptance, E,TB, VU by DL at 12/13/2022 0240    Acceptance, D,E, VU,NR by LL at 12/12/2022 1455    Acceptance, E,TB, VU by RW at 12/9/2022 1451    Acceptance, E,TB, VU by BC at 11/27/2022 1319    Acceptance, E,TB, VU by RM at 11/20/2022 1449    Acceptance, E,TB, VU by RM at 11/18/2022 1021    Acceptance, E,TB, VU by RH at 11/16/2022 1110    Acceptance, E,TB, VU by KK at 11/14/2022 1638    Acceptance, E,TB, VU by KK at 11/13/2022 1648    Acceptance, E,TB, VU by KG at 11/10/2022 1827    Acceptance, E,TB, VU by DM at 11/9/2022 1638    Acceptance, E,TB, VU by KK at 11/8/2022 1754    Acceptance, E,TB, VU by KK at 11/7/2022 1635    Acceptance, E,D, VU,NR by LL at 11/5/2022 1325    Acceptance, E, VU by EB at 11/4/2022 1448     Acceptance, E,TB, VU by KM at 11/4/2022 1101   Family Acceptance, D,E, VU,NR by LL at 12/12/2022 1455                   Point: Body mechanics (Done)     Learning Progress Summary           Patient Acceptance, E,TB, VU by KK at 1/6/2023 1225    Acceptance, E, VU by CL at 1/5/2023 1414    Acceptance, E,TB, DU,VU by BD at 1/3/2023 0838    Acceptance, TB,E, VU,DU by BD at 1/2/2023 0907    Acceptance, TB,E, VU,DU by BD at 1/1/2023 0846    Acceptance, E,TB, VU by RM at 12/30/2022 1035    Acceptance, E,TB, VU by AC at 12/28/2022 1505    Acceptance, E,TB, VU by AC at 12/27/2022 1513    Acceptance, E, VU by CL at 12/26/2022 1325    Acceptance, E, VU by CL at 12/22/2022 1826    Acceptance, E, VU by CL at 12/21/2022 1303    Acceptance, E, VU by EB at 12/20/2022 1644    Acceptance, E, VU by CL at 12/17/2022 1703    Acceptance, E,D, VU,NR by LL at 12/16/2022 1605    Acceptance, E,TB, VU by AC at 12/16/2022 1523    Acceptance, E,TB, VU by AC at 12/15/2022 1618    Acceptance, E,TB, VU by AC at 12/14/2022 1503    Acceptance, E,TB, VU by DL at 12/13/2022 0240    Acceptance, D,E, VU,NR by LL at 12/12/2022 1455    Acceptance, E,TB, VU by RW at 12/9/2022 1451    Acceptance, E,TB, VU by BC at 11/27/2022 1319    Acceptance, E,TB, VU by RM at 11/20/2022 1449    Acceptance, E,TB, VU by RM at 11/18/2022 1021    Acceptance, E,TB, VU by RH at 11/16/2022 1110    Acceptance, E,TB, VU by KK at 11/14/2022 1638    Acceptance, E,TB, VU by KK at 11/13/2022 1648    Acceptance, E,TB, VU by KG at 11/10/2022 1827    Acceptance, E,TB, VU by DM at 11/9/2022 1638    Acceptance, E,TB, VU by KK at 11/8/2022 1754    Acceptance, E,TB, VU by KK at 11/7/2022 1635    Acceptance, E,D, VU,NR by LL at 11/5/2022 1325    Acceptance, E, VU by EB at 11/4/2022 1448    Acceptance, E,TB, VU by KM at 11/4/2022 1101   Family Acceptance, D,E, VU,NR by LL at 12/12/2022 1455                   Point: Precautions (Done)     Learning Progress Summary           Patient Acceptance,  E,TB, VU by KK at 1/6/2023 1225    Acceptance, E, VU by CL at 1/5/2023 1414    Acceptance, E,TB, DU,VU by BD at 1/3/2023 0838    Acceptance, TB,E, VU,DU by BD at 1/2/2023 0907    Acceptance, TB,E, VU,DU by BD at 1/1/2023 0846    Acceptance, E,TB, VU by RM at 12/30/2022 1035    Acceptance, E,TB, VU by AC at 12/28/2022 1505    Acceptance, E,TB, VU by AC at 12/27/2022 1513    Acceptance, E, VU by CL at 12/26/2022 1325    Acceptance, E, VU by CL at 12/22/2022 1826    Acceptance, E, VU by CL at 12/21/2022 1303    Acceptance, E, VU by EB at 12/20/2022 1644    Acceptance, E, VU by CL at 12/17/2022 1703    Acceptance, E,D, VU,NR by LL at 12/16/2022 1605    Acceptance, E,TB, VU by AC at 12/16/2022 1523    Acceptance, E,TB, VU by AC at 12/15/2022 1618    Acceptance, E,TB, VU by AC at 12/14/2022 1503    Acceptance, E,TB, VU by DL at 12/13/2022 0240    Acceptance, D,E, VU,NR by LL at 12/12/2022 1455    Acceptance, E,TB, VU by RW at 12/9/2022 1451    Acceptance, E,TB, VU by BC at 11/27/2022 1319    Acceptance, E,TB, VU by RM at 11/20/2022 1449    Acceptance, E,TB, VU by RM at 11/18/2022 1021    Acceptance, E,TB, VU by RH at 11/16/2022 1110    Acceptance, E,TB, VU by KK at 11/14/2022 1638    Acceptance, E,TB, VU by KK at 11/13/2022 1648    Acceptance, E,TB, VU by KG at 11/10/2022 1827    Acceptance, E,TB, VU by DM at 11/9/2022 1638    Acceptance, E,TB, VU by KK at 11/8/2022 1754    Acceptance, E,TB, VU by KK at 11/7/2022 1635    Acceptance, E,D, VU,NR by LL at 11/5/2022 1325    Acceptance, E, VU by EB at 11/4/2022 1448    Acceptance, E,TB, VU by KM at 11/4/2022 1101   Family Acceptance, D,E, VU,NR by LL at 12/12/2022 1455                               User Key     Initials Effective Dates Name Provider Type Discipline    CL 06/16/21 -  Mitzy Mustafa, RN Registered Nurse Nurse    LL 05/02/16 -  Neda Garcia PTA Physical Therapist Assistant PT    RM 06/14/22 -  Bethany Krause RN Registered Nurse Nurse    DM 06/16/21 -   Moniz, Deborah, RN Registered Nurse Nurse    BC 08/02/22 -  Collett, Brittany, RN Registered Nurse Nurse    AC 10/13/22 -  Jaya Castillo, RN Registered Nurse Nurse    BD 08/05/21 -  Radha Lynn, RN Registered Nurse Nurse    KM 05/24/22 -  Mao Vázquez, PT Physical Therapist PT    EB 09/22/22 -  Cedric Olivre, RN Registered Nurse Nurse    KK 06/27/22 -  Chelsi Bolivar, RN Registered Nurse Nurse    DL 03/16/22 -  Francy Garcia, RN Registered Nurse Nurse    KG 09/22/22 -  Verónica Rodriguez, RN Registered Nurse Nurse    RW 09/22/22 -  Bethany Schofield, RN Registered Nurse Nurse    RH 08/03/22 - 12/21/22 Jung Russell, SONAM Extern Registered Nurse Nurse              PT Recommendation and Plan  Anticipated Discharge Disposition (PT): home with 24/7 [facility], skilled nursing facility  Planned Therapy Interventions (PT): balance training, bed mobility training, gait training, home exercise program, manual therapy techniques, motor coordination training, neuromuscular re-education, patient/family education, postural re-education, strengthening, transfer training  Therapy Frequency (PT): 5 times/wk  Progress Summary (PT)  Progress Toward Functional Goals (PT): progress toward functional goals is good, prepare for discharge  Plan of Care Reviewed With: patient  Progress: improving       Time Calculation:    PT Charges     Row Name 01/10/23 1432             Time Calculation    Start Time 1247  -CT      Stop Time 1320  -CT      Time Calculation (min) 33 min  -CT      PT Received On 01/10/23  -CT            User Key  (r) = Recorded By, (t) = Taken By, (c) = Cosigned By    Initials Name Provider Type    CT Bhargavi Vásquez, EDEL Physical Therapist              Therapy Charges for Today     Code Description Service Date Service Provider Modifiers Qty    62894201283 HC GAIT TRAINING EA 15 MIN 1/9/2023 Bhargavi Vásquez, PT GP 1    83657283533 HC PT THERAPEUTIC ACT EA 15 MIN 1/9/2023 Bhargavi Vásquez, PT GP 1    41001361868 HC PT THER  PROC EA 15 MIN 1/9/2023 Bhargavi Vásquez, PT GP 1    79239674470 HC GAIT TRAINING EA 15 MIN 1/10/2023 Bhargavi Vásquez, PT GP 1    22732518770 HC PT THER PROC EA 15 MIN 1/10/2023 Bhargavi Vásquez, PT GP 1          PT G-Codes  Outcome Measure Options: AM-PAC 6 Clicks Daily Activity (OT)  AM-PAC 6 Clicks Score (PT): 15  AM-PAC 6 Clicks Score (OT): 19    Bhargavi Vásquez, PT  1/10/2023

## 2023-01-10 NOTE — PLAN OF CARE
Goal Outcome Evaluation:  Plan of Care Reviewed With: patient        Progress: improving  Outcome Evaluation: Patient seen for OT treatment session. She performed sit to stands to improve functional transfers, able to perform X5 trials with contact gaurd assist using rolling walker. Patient still requiring intermittent cueing for safety. Continue plan of care.

## 2023-01-10 NOTE — PROGRESS NOTES
[facility] HOSPITALIST PROGRESS NOTE     Patient Identification:  Name:  Tre Kinney  Age:  89 y.o.  Sex:  female  :  1932  MRN:  [id number]  Visit Number:  [id number]  ROOM: [address] [place] [address]     Primary Care Provider:  Jessica Pedro APRN    Length of stay in inpatient status:  96    Subjective     Chief Compliant:  No chief complaint on file.      History of Presenting Illness:    Patient notes feeling well today. Denies any recurrent chest pain. Reports PT/OT told her today was the best she had ever done with them. She notes she still feels she needs someone with her when she ambulates but is feeling strong. She notes she is anxious to go home but believes her son will be able to be with her when she does. She denies any new complaints.     ROS:  Otherwise 10 point ROS negative other than documented above in HPI.     Objective     Current Hospital Meds:apixaban, 5 mg, Oral, Q12H  Diclofenac Sodium, 4 g, Topical, 4x Daily  docusate sodium, 100 mg, Oral, BID  escitalopram, 10 mg, Oral, Daily  furosemide, 40 mg, Oral, BID  gabapentin, 300 mg, Oral, Nightly  isosorbide mononitrate, 30 mg, Oral, Q24H  magnesium oxide, 400 mg, Oral, Daily  melatonin, 10 mg, Oral, Nightly  metoprolol tartrate, 25 mg, Oral, Q12H  multivitamin, 1 tablet, Oral, Daily  pantoprazole, 40 mg, Oral, Q AM  sodium chloride, 10 mL, Intravenous, Q12H  sodium chloride, 10 mL, Intravenous, Q12H  tamsulosin, 0.4 mg, Oral, Daily         Current Antimicrobial Therapy:  Anti-Infectives (From admission, onward)    Ordered     Dose/Rate Route Frequency Start Stop    22 1013  fluconazole (DIFLUCAN) tablet 100 mg        Ordering Provider: Lucio Murphy MD    100 mg Oral Once 22 1200 22 1145    22 1536  cefTRIAXone (ROCEPHIN) 1 g in sodium chloride 0.9 % 100 mL IVPB-VTB        Ordering Provider: Dante Levine MD    1 g  200 mL/hr over 30 Minutes Intravenous Every 24 Hours 22 2300 22 0110        Current  Diuretic Therapy:  Diuretics (From admission, onward)    Ordered     Dose/Rate Route Frequency Start Stop    10/05/22 1252  furosemide (LASIX) tablet 40 mg        Ordering Provider: Jannet Lagunas MD    40 mg Oral 2 Times Daily (Diuretics) 10/05/22 1800          ----------------------------------------------------------------------------------------------------------------------  Vital Signs:  Temp:  [98 °F (36.7 °C)-98.3 °F (36.8 °C)] 98.3 °F (36.8 °C)  Heart Rate:  [77] 77  Resp:  [16] 16  BP: (149-155)/(75-76) 149/75  SpO2:  [98 %] 98 %  on   ;   Device (Oxygen Therapy): room air  Body mass index is 26.38 kg/m².    Wt Readings from Last 3 Encounters:   01/09/23 76.4 kg (168 lb 8 oz)   10/05/22 76.6 kg (168 lb 14.4 oz)   09/08/22 76.7 kg (169 lb)     Intake & Output (last 3 days)       01/07 0701 01/08 0700 01/08 0701 01/09 0700 01/09 0701  01/10 0700    P.O. 255 560 840    I.V. (mL/kg)  0 (0)     Total Intake(mL/kg) 255 (3.4) 560 (7.3) 840 (11)    Net +255 +560 +840           Urine Unmeasured Occurrence 1 x 5 x 2 x    Stool Unmeasured Occurrence  2 x 1 x        Diet: Texture: Regular Texture (IDDSI 7); Fluid Consistency: Thin (IDDSI 0); Regular/House Diet  ----------------------------------------------------------------------------------------------------------------------  Physical exam:  Constitutional:  Well-developed and well-nourished.  No respiratory distress.      HENT:  Head:  Normocephalic and atraumatic.  Mouth:  Moist mucous membranes.    Eyes:  Conjunctivae and EOM are normal. No scleral icterus.    Neck:  Neck supple.  No JVD present.    Cardiovascular:  Normal rate, regular rhythm and normal heart sounds with no murmur.  Pulmonary/Chest:  No respiratory distress, no wheezes, no crackles, with normal breath sounds and good air movement.  Abdominal:  Soft.  Bowel sounds are normal.  No distension and no tenderness.   Musculoskeletal:  No edema, no tenderness, and no deformity.  No red or swollen  joints anywhere.    Neurological:  Alert and oriented to person, place, and time.  No cranial nerve deficit.  No tongue deviation.  No facial droop.  No slurred speech.   Skin:  Skin is warm and dry. No rash noted. No pallor.   Peripheral vascular:  Pulses in all 4 extremities with no clubbing, no cyanosis, no edema.  ----------------------------------------------------------------------------------------------------------------------  Tele:    ----------------------------------------------------------------------------------------------------------------------  Results from last 7 days   Lab Units 01/08/23  0332 01/03/23  0124   WBC 10*3/mm3 6.98 6.37   HEMOGLOBIN g/dL 10.2* 10.4*   HEMATOCRIT % 33.2* 33.2*   MCV fL 92.2 92.0   MCHC g/dL 30.7* 31.3*   PLATELETS 10*3/mm3 226 235         Results from last 7 days   Lab Units 01/08/23  0332 01/03/23  0124   SODIUM mmol/L 138 138   POTASSIUM mmol/L 4.3 3.9   CHLORIDE mmol/L 101 100   CO2 mmol/L 27.7 24.0   BUN mg/dL 32* 32*   CREATININE mg/dL 1.97* 2.21*   CALCIUM mg/dL 9.1 9.1   GLUCOSE mg/dL 111* 110*   ALBUMIN g/dL  --  3.2*   BILIRUBIN mg/dL  --  0.3   ALK PHOS U/L  --  53   AST (SGOT) U/L  --  8   ALT (SGPT) U/L  --  5   Estimated Creatinine Clearance: 20.2 mL/min (A) (by C-G formula based on SCr of 1.97 mg/dL (H)).  No results found for: AMMONIA              No results found for: HGBA1C, POCGLU  Lab Results   Component Value Date    TSH 3.030 10/07/2022     No results found for: PREGTESTUR, PREGSERUM, HCG, HCGQUANT  Pain Management Panel     Pain Management Panel Latest Ref Rng & Units 9/20/2022 7/24/2019    CREATININE UR mg/dL 110.9 48.9    AMPHETAMINES SCREEN, URINE Negative - -    BARBITURATES SCREEN Negative - -    BENZODIAZEPINE SCREEN, URINE Negative - -    COCAINE SCREEN, URINE Negative - -    METHADONE SCREEN, URINE Negative - -        Brief Urine Lab Results  (Last result in the past 365 days)      Color   Clarity   Blood   Leuk Est   Nitrite   Protein    CREAT   Urine HCG        11/18/22 0953 Yellow   Turbid   Large (3+)   Large (3+)   Negative   30 mg/dL (1+)               No results found for: BLOODCX  No results found for: URINECX  No results found for: WOUNDCX  No results found for: STOOLCX  No results found for: RESPCX  No results found for: AFBCX        I have personally looked at the labs and they are summarized above.  ----------------------------------------------------------------------------------------------------------------------  Detailed radiology reports for the last 24 hours:    Imaging Results (Last 24 Hours)     ** No results found for the last 24 hours. **        Assessment & Plan    Generalized weakness/debility  Diffuse LLE DVT  DIANA on CKD IV  Acute blood loss anemia  Traumatic right gluteal hematoma  Mild hypokalemia   Episode of CP  Paroxysmal Afib  Hx of SSS s/p pacemaker placement  Essential HTN  Reported hx of DVT's   Anxiety/depression  Advanced age    BP slightly elevated but overall reasonably controlled. Overall weakness and debility appears improved. Tolerating eliquis. No recurrent episodes of chest pain. Continue imdur. Creatinine stable at 1.97 on 1/8. Hemoglobin stable at 10.2 on 1/8.     Dispo: Admitted to swing bed through 1/10. Will await reevaluation from insurance to see if more days are approved.     VTE Prophylaxis:   Mechanical Order History:      Ordered        10/05/22 1252  Place Sequential Compression Device  Once            10/05/22 1252  Maintain Sequential Compression Device  Continuous                    Pharmalogical Order History:      Ordered     Dose Route Frequency Stop    10/27/22 1034  apixaban (ELIQUIS) tablet 5 mg         5 mg PO Every 12 Hours Scheduled --    10/21/22 1301  Enoxaparin Sodium (LOVENOX) syringe 70 mg  Status:  Discontinued         1 mg/kg SC Every 24 Hours 10/27/22 1034    10/17/22 1147  Enoxaparin Sodium (LOVENOX) syringe 80 mg  Status:  Discontinued         1 mg/kg SC Every 24 Hours  10/21/22 1301    10/12/22 1847  Enoxaparin Sodium (LOVENOX) syringe 70 mg  Status:  Discontinued         1 mg/kg SC Every 24 Hours 10/17/22 1147    10/12/22 1831  Pharmacy to Dose enoxaparin (LOVENOX)  Status:  Discontinued         -- XX Continuous PRN 10/13/22 1417    10/05/22 1252  heparin (porcine) 5000 UNIT/ML injection 5,000 Units  Status:  Discontinued         5,000 Units SC Every 12 Hours Scheduled 10/12/22 1846                  Dante Levine MD  [facility]  01/09/23  19:01 EST

## 2023-01-10 NOTE — THERAPY TREATMENT NOTE
Patient Name: Tre Kinney  : 1932    MRN: [id number]                              Today's Date: 1/10/2023       Admit Date: 10/5/2022    Visit Dx: No diagnosis found.  Patient Active Problem List   Diagnosis   • Acid reflux   • Ankle arthralgia   • Cardiac conduction disorder   • Closed fracture of distal fibula   • Arteriosclerosis of coronary artery   • Fracture of distal end of tibia   • Elevated cholesterol   • BP (high blood pressure)   • Hypertrophic polyarthritis   • OP (osteoporosis)   • Right heart failure (HCC)   • Heart failure, chronic, right-sided (HCC)   • SOB (shortness of breath)   • Palpitations   • CHF (congestive heart failure) (HCC)   • Congestive heart failure (HCC)   • Precordial pain   • Chronic anticoagulation   • Hypokalemia   • Anemia, unspecified type   • Generalized weakness     Past Medical History:   Diagnosis Date   • Anemia    • Arthritis    • CHF (congestive heart failure) (HCC)    • Chronic kidney disease    • Coronary artery disease    • H/O blood clots    • History of transfusion     no reaction    • Hypertension      Past Surgical History:   Procedure Laterality Date   • ABDOMINAL SURGERY     • APPENDECTOMY     • CARDIAC SURGERY     •  SECTION     • COLONOSCOPY N/A 3/7/2018    Procedure: COLONOSCOPY;  Surgeon: Willie Gresham MD;  Location:  [facility];  Service:    • CORONARY ANGIOPLASTY WITH STENT PLACEMENT     • ENDOSCOPY N/A 3/6/2018    Procedure: ESOPHAGOGASTRODUODENOSCOPY;  Surgeon: Willie Gresham MD;  Location:  [facility];  Service:    • PACEMAKER IMPLANTATION      left side    • SKIN BIOPSY     • TUBAL ABDOMINAL LIGATION        General Information     Row Name 01/10/23 1403          OT Time and Intention    Document Type therapy note (daily note)  -     Mode of Treatment individual therapy;occupational therapy  -     Row Name 01/10/23 1403          General Information    Patient Profile Reviewed yes  -     Existing  Precautions/Restrictions fall  -     Row Name 01/10/23 1403          Cognition    Orientation Status (Cognition) oriented x 4  -     Row Name 01/10/23 1403          Safety Issues, Functional Mobility    Impairments Affecting Function (Mobility) balance;strength;endurance/activity tolerance  -           User Key  (r) = Recorded By, (t) = Taken By, (c) = Cosigned By    Initials Name Provider Type    Linn Richardson OT Occupational Therapist                 Mobility/ADL's     Row Name 01/10/23 1404          Bed Mobility    Comment, (Bed Mobility) patient seated up in recliner upon arrival  -     Row Name 01/10/23 1404          Transfers    Transfers sit-stand transfer;stand-sit transfer  -     Comment, (Transfers) sit to stand X5 trials with rolling walker contact guard assist with intermittent verbal cues for safe technique  -     Row Name 01/10/23 1404          Sit-Stand Transfer    Sit-Stand [place] (Transfers) contact guard  -     Assistive Device (Sit-Stand Transfers) walker, front-wheeled  -     Comment, (Sit-Stand Transfer) 5 trials, 1st trial X1 minute static standing  -     Row Name 01/10/23 1404          Stand-Sit Transfer    Stand-Sit [place] (Transfers) contact guard  -     Assistive Device (Stand-Sit Transfers) walker, front-wheeled  -           User Key  (r) = Recorded By, (t) = Taken By, (c) = Cosigned By    Initials Name Provider Type    Linn Richardson OT Occupational Therapist               Obj/Interventions     Row Name 01/10/23 1406          Motor Skills    Motor Skills functional endurance  -     Functional Endurance fair plus  -     Therapeutic Exercise --  BUE exercises while seated through functional movement patterns with red theraband X20 reps X2 sets (AROM for shoulder flexion)  -           User Key  (r) = Recorded By, (t) = Taken By, (c) = Cosigned By    Initials Name Provider Type    Linn Richardson OT Occupational Therapist                Goals/Plan    No documentation.                Clinical Impression     Row Name 01/10/23 1407          Pain Assessment    Pretreatment Pain Rating 0/10 - no pain  -KP     Posttreatment Pain Rating 0/10 - no pain  -KP     Row Name 01/10/23 1407          Plan of Care Review    Plan of Care Reviewed With patient  -     Progress improving  -     Outcome Evaluation Patient seen for OT treatment session. She performed sit to stands to improve functional transfers, able to perform X5 trials with contact gaurd assist using rolling walker. Patient still requiring intermittent cueing for safety. Continue plan of care.  -     Row Name 01/10/23 1407          Therapy Plan Review/Discharge Plan (OT)    Anticipated Discharge Disposition (OT) skilled nursing facility;home with 24/7 care;home with home health  -     Row Name 01/10/23 1407          Positioning and Restraints    Pre-Treatment Position sitting in chair/recliner  -KP     Post Treatment Position chair  -KP     In Chair sitting;call light within reach;encouraged to call for assist  -KP           User Key  (r) = Recorded By, (t) = Taken By, (c) = Cosigned By    Initials Name Provider Type    Linn Richardson, OT Occupational Therapist               Outcome Measures     Row Name 01/10/23 1409          How much help from another is currently needed...    Putting on and taking off regular lower body clothing? 3  -KP     Bathing (including washing, rinsing, and drying) 3  -KP     Toileting (which includes using toilet bed pan or urinal) 3  -KP     Putting on and taking off regular upper body clothing 3  -KP     Taking care of personal grooming (such as brushing teeth) 3  -KP     Eating meals 4  -KP     AM-PAC 6 Clicks Score (OT) 19  -KP     Row Name 01/10/23 0811          How much help from another person do you currently need...    Turning from your back to your side while in flat bed without using bedrails? 3  -KE     Moving from lying on back to sitting on the side  of a flat bed without bedrails? 3  -KE     Moving to and from a bed to a chair (including a wheelchair)? 3  -KE     Standing up from a chair using your arms (e.g., wheelchair, bedside chair)? 2  -KE     Climbing 3-5 steps with a railing? 2  -KE     To walk in hospital room? 2  -KE     AM-PAC 6 Clicks Score (PT) 15  -KE     Highest level of mobility 4 --> Transferred to chair/commode  -     Row Name 01/10/23 1409          Functional Assessment    Outcome Measure Options AM-PAC 6 Clicks Daily Activity (OT)  -           User Key  (r) = Recorded By, (t) = Taken By, (c) = Cosigned By    Initials Name Provider Type    Anne Mae, RN Registered Nurse    Linn Richardson OT Occupational Therapist                  OT Recommendation and Plan     Plan of Care Review  Plan of Care Reviewed With: patient  Progress: improving  Outcome Evaluation: Patient seen for OT treatment session. She performed sit to stands to improve functional transfers, able to perform X5 trials with contact gaurd assist using rolling walker. Patient still requiring intermittent cueing for safety. Continue plan of care.     Time Calculation:    Time Calculation- OT     Row Name 01/10/23 1410             Time Calculation- OT    OT Start Time 1100  -      OT Stop Time 1126  -      OT Time Calculation (min) 26 min  -      Total Timed Code Minutes- OT 26 minute(s)  -      OT Received On 01/10/23  -            User Key  (r) = Recorded By, (t) = Taken By, (c) = Cosigned By    Initials Name Provider Type    Linn Richardson OT Occupational Therapist              Therapy Charges for Today     Code Description Service Date Service Provider Modifiers Qty    82573287460 HC OT SELF CARE/MGMT/TRAIN EA 15 MIN 1/9/2023 Linn Quevedo OT GO 1    43469233802 HC OT THER PROC EA 15 MIN 1/9/2023 Linn Quevedo OT GO 1    30062491988 HC OT THERAPEUTIC ACT EA 15 MIN 1/10/2023 Linn Quevedo OT GO 1    42107384162 HC OT THER PROC EA 15 MIN 1/10/2023  Linn Quevedo, OT GO 1               Linn Quevedo, OT  1/10/2023

## 2023-01-11 PROCEDURE — 97110 THERAPEUTIC EXERCISES: CPT

## 2023-01-11 PROCEDURE — 97116 GAIT TRAINING THERAPY: CPT

## 2023-01-11 PROCEDURE — 97530 THERAPEUTIC ACTIVITIES: CPT

## 2023-01-11 RX ADMIN — GABAPENTIN 300 MG: 300 CAPSULE ORAL at 20:45

## 2023-01-11 RX ADMIN — ISOSORBIDE MONONITRATE 30 MG: 30 TABLET, EXTENDED RELEASE ORAL at 08:22

## 2023-01-11 RX ADMIN — DICLOFENAC 4 G: 10 GEL TOPICAL at 11:57

## 2023-01-11 RX ADMIN — MAGNESIUM GLUCONATE 500 MG ORAL TABLET 400 MG: 500 TABLET ORAL at 08:22

## 2023-01-11 RX ADMIN — TAMSULOSIN HYDROCHLORIDE 0.4 MG: 0.4 CAPSULE ORAL at 08:22

## 2023-01-11 RX ADMIN — Medication 1 TABLET: at 08:22

## 2023-01-11 RX ADMIN — APIXABAN 5 MG: 5 TABLET, FILM COATED ORAL at 08:22

## 2023-01-11 RX ADMIN — DICLOFENAC 4 G: 10 GEL TOPICAL at 08:24

## 2023-01-11 RX ADMIN — Medication 10 MG: at 20:45

## 2023-01-11 RX ADMIN — METOPROLOL TARTRATE 25 MG: 25 TABLET, FILM COATED ORAL at 08:22

## 2023-01-11 RX ADMIN — DICLOFENAC 4 G: 10 GEL TOPICAL at 20:45

## 2023-01-11 RX ADMIN — FUROSEMIDE 40 MG: 40 TABLET ORAL at 10:33

## 2023-01-11 RX ADMIN — FUROSEMIDE 40 MG: 40 TABLET ORAL at 18:07

## 2023-01-11 RX ADMIN — DOCUSATE SODIUM 100 MG: 100 CAPSULE ORAL at 20:46

## 2023-01-11 RX ADMIN — METOPROLOL TARTRATE 25 MG: 25 TABLET, FILM COATED ORAL at 20:45

## 2023-01-11 RX ADMIN — DICLOFENAC 4 G: 10 GEL TOPICAL at 18:08

## 2023-01-11 RX ADMIN — APIXABAN 5 MG: 5 TABLET, FILM COATED ORAL at 20:45

## 2023-01-11 RX ADMIN — ESCITALOPRAM 10 MG: 10 TABLET, FILM COATED ORAL at 08:22

## 2023-01-11 RX ADMIN — PANTOPRAZOLE SODIUM 40 MG: 40 TABLET, DELAYED RELEASE ORAL at 08:21

## 2023-01-11 NOTE — THERAPY TREATMENT NOTE
Acute Care - Physical Therapy Treatment Note   [facility]     Patient Name: Tre Kinney  : 1932  MRN: [id number]  Today's Date: 2023   Onset of Illness/Injury or Date of Surgery: 10/06/22  Visit Dx:   No diagnosis found.  Patient Active Problem List   Diagnosis   • Acid reflux   • Ankle arthralgia   • Cardiac conduction disorder   • Closed fracture of distal fibula   • Arteriosclerosis of coronary artery   • Fracture of distal end of tibia   • Elevated cholesterol   • BP (high blood pressure)   • Hypertrophic polyarthritis   • OP (osteoporosis)   • Right heart failure (HCC)   • Heart failure, chronic, right-sided (HCC)   • SOB (shortness of breath)   • Palpitations   • CHF (congestive heart failure) (HCC)   • Congestive heart failure (HCC)   • Precordial pain   • Chronic anticoagulation   • Hypokalemia   • Anemia, unspecified type   • Generalized weakness     Past Medical History:   Diagnosis Date   • Anemia    • Arthritis    • CHF (congestive heart failure) (HCC)    • Chronic kidney disease    • Coronary artery disease    • H/O blood clots    • History of transfusion     no reaction    • Hypertension      Past Surgical History:   Procedure Laterality Date   • ABDOMINAL SURGERY     • APPENDECTOMY     • CARDIAC SURGERY     •  SECTION     • COLONOSCOPY N/A 3/7/2018    Procedure: COLONOSCOPY;  Surgeon: Willie Gresham MD;  Location:  [facility];  Service:    • CORONARY ANGIOPLASTY WITH STENT PLACEMENT     • ENDOSCOPY N/A 3/6/2018    Procedure: ESOPHAGOGASTRODUODENOSCOPY;  Surgeon: Willie Gresham MD;  Location:  [facility];  Service:    • PACEMAKER IMPLANTATION      left side    • SKIN BIOPSY     • TUBAL ABDOMINAL LIGATION       PT Assessment (last 12 hours)     PT Evaluation and Treatment     Row Name 23 1457          Physical Therapy Time and Intention    Subjective Information no complaints  -CT     Document Type therapy note (daily note)  -CT     Mode of Treatment physical  therapy;individual therapy  -CT     Patient Effort good  -CT     Comment Pts treatment session [id number]. Pt ambulated x 2 and performed seated and standing ther ex.  -CT     Row Name 01/11/23 1457          General Information    Patient Profile Reviewed yes  -CT     Row Name 01/11/23 1457          Cognition    Affect/Mental Status (Cognition) WFL  -CT     Orientation Status (Cognition) oriented x 4  -CT     Follows Commands (Cognition) WFL  -CT     Row Name 01/11/23 1457          Transfers    Transfers sit-stand transfer;stand-sit transfer  -CT     Row Name 01/11/23 1457          Sit-Stand Transfer    Sit-Stand [place] (Transfers) contact guard  -CT     Assistive Device (Sit-Stand Transfers) walker, front-wheeled  -CT     Row Name 01/11/23 1457          Stand-Sit Transfer    Stand-Sit [place] (Transfers) contact guard  -CT     Assistive Device (Stand-Sit Transfers) walker, front-wheeled  -CT     Row Name 01/11/23 1457          Gait/Stairs (Locomotion)    [place] Level (Gait) contact guard  -CT     Assistive Device (Gait) walker, front-wheeled  -CT     Distance in Feet (Gait) 50 ft x 2  -CT     Pattern (Gait) step-to  -CT     Deviations/Abnormal Patterns (Gait) stride length decreased;gait speed decreased;festinating/shuffling;weight shifting decreased  -CT     Bilateral Gait Deviations weight shift ability decreased;forward flexed posture  -CT     Row Name 01/11/23 1457          Motor Skills    Therapeutic Exercise --  seated ther ex: seated marches, LAQ, AP, hip abd/add, glut sets; standing: standing marches, heel raises, mini squats  -CT     Row Name 01/11/23 1457          Coping    Observed Emotional State calm;cooperative;pleasant  -CT     Verbalized Emotional State acceptance  -CT     Row Name 01/11/23 1457          Plan of Care Review    Plan of Care Reviewed With patient  -CT     Row Name 01/11/23 1457          Positioning and Restraints    Pre-Treatment Position sitting in chair/recliner  -CT      Post Treatment Position chair  -CT     In Chair sitting;call light within reach;encouraged to call for assist  -CT           User Key  (r) = Recorded By, (t) = Taken By, (c) = Cosigned By    Initials Name Provider Type    CT Bhargavi Vásquez PT Physical Therapist                Physical Therapy Education     Title: PT OT SLP Therapies (Done)     Topic: Physical Therapy (Done)     Point: Mobility training (Done)     Learning Progress Summary           Patient Acceptance, E,TB, VU by KK at 1/6/2023 1225    Acceptance, E, VU by CL at 1/5/2023 1414    Acceptance, E,TB, DU,VU by BD at 1/3/2023 0838    Acceptance, TB,E, VU,DU by BD at 1/2/2023 0907    Acceptance, TB,E, VU,DU by BD at 1/1/2023 0846    Acceptance, E,TB, VU by RM at 12/30/2022 1035    Acceptance, E,TB, VU by AC at 12/28/2022 1505    Acceptance, E,TB, VU by AC at 12/27/2022 1513    Acceptance, E, VU by CL at 12/26/2022 1325    Acceptance, E, VU by CL at 12/22/2022 1826    Acceptance, E, VU by CL at 12/21/2022 1303    Acceptance, E, VU by EB at 12/20/2022 1644    Acceptance, E, VU by CL at 12/17/2022 1703    Acceptance, E,D, VU,NR by LL at 12/16/2022 1605    Acceptance, E,TB, VU by AC at 12/16/2022 1523    Acceptance, E,TB, VU by AC at 12/15/2022 1618    Acceptance, E,TB, VU by AC at 12/14/2022 1503    Acceptance, E,TB, VU by DL at 12/13/2022 0240    Acceptance, D,E, VU,NR by LL at 12/12/2022 1455    Acceptance, E,TB, VU by RW at 12/9/2022 1451    Acceptance, E,TB, VU by BC at 11/27/2022 1319    Acceptance, E,TB, VU by RM at 11/20/2022 1449    Acceptance, E,TB, VU by RM at 11/18/2022 1021    Acceptance, E,TB, VU by RH at 11/16/2022 1110    Acceptance, E,TB, VU by KK at 11/14/2022 1638    Acceptance, E,TB, VU by KK at 11/13/2022 1648    Acceptance, E,TB, VU by KG at 11/10/2022 1827    Acceptance, E,TB, VU by DM at 11/9/2022 1638    Acceptance, E,TB, VU by KK at 11/8/2022 1754    Acceptance, E,TB, VU by KK at 11/7/2022 1635    Acceptance, E,D, VU,NR by LL at  11/5/2022 1325    Acceptance, E, VU by EB at 11/4/2022 1448    Acceptance, E,TB, VU by KM at 11/4/2022 1101   Family Acceptance, D,E, VU,NR by LL at 12/12/2022 1455                   Point: Home exercise program (Done)     Learning Progress Summary           Patient Acceptance, E,TB, VU by KK at 1/6/2023 1225    Acceptance, E, VU by CL at 1/5/2023 1414    Acceptance, E,TB, DU,VU by BD at 1/3/2023 0838    Acceptance, TB,E, VU,DU by BD at 1/2/2023 0907    Acceptance, TB,E, VU,DU by BD at 1/1/2023 0846    Acceptance, E,TB, VU by RM at 12/30/2022 1035    Acceptance, E,TB, VU by AC at 12/28/2022 1505    Acceptance, E,TB, VU by AC at 12/27/2022 1513    Acceptance, E, VU by CL at 12/26/2022 1325    Acceptance, E, VU by CL at 12/22/2022 1826    Acceptance, E, VU by CL at 12/21/2022 1303    Acceptance, E, VU by EB at 12/20/2022 1644    Acceptance, E, VU by CL at 12/17/2022 1703    Acceptance, E,D, VU,NR by LL at 12/16/2022 1605    Acceptance, E,TB, VU by AC at 12/16/2022 1523    Acceptance, E,TB, VU by AC at 12/15/2022 1618    Acceptance, E,TB, VU by AC at 12/14/2022 1503    Acceptance, E,TB, VU by DL at 12/13/2022 0240    Acceptance, D,E, VU,NR by LL at 12/12/2022 1455    Acceptance, E,TB, VU by RW at 12/9/2022 1451    Acceptance, E,TB, VU by BC at 11/27/2022 1319    Acceptance, E,TB, VU by RM at 11/20/2022 1449    Acceptance, E,TB, VU by RM at 11/18/2022 1021    Acceptance, E,TB, VU by RH at 11/16/2022 1110    Acceptance, E,TB, VU by KK at 11/14/2022 1638    Acceptance, E,TB, VU by KK at 11/13/2022 1648    Acceptance, E,TB, VU by KG at 11/10/2022 1827    Acceptance, E,TB, VU by DM at 11/9/2022 1638    Acceptance, E,TB, VU by KK at 11/8/2022 1754    Acceptance, E,TB, VU by KK at 11/7/2022 1635    Acceptance, E,D, VU,NR by LL at 11/5/2022 1325    Acceptance, E, VU by EB at 11/4/2022 1448    Acceptance, E,TB, VU by KM at 11/4/2022 1101   Family Acceptance, D,E, VU,NR by LL at 12/12/2022 1455                   Point: Body  mechanics (Done)     Learning Progress Summary           Patient Acceptance, E,TB, VU by KK at 1/6/2023 1225    Acceptance, E, VU by CL at 1/5/2023 1414    Acceptance, E,TB, DU,VU by BD at 1/3/2023 0838    Acceptance, TB,E, VU,DU by BD at 1/2/2023 0907    Acceptance, TB,E, VU,DU by BD at 1/1/2023 0846    Acceptance, E,TB, VU by RM at 12/30/2022 1035    Acceptance, E,TB, VU by AC at 12/28/2022 1505    Acceptance, E,TB, VU by AC at 12/27/2022 1513    Acceptance, E, VU by CL at 12/26/2022 1325    Acceptance, E, VU by CL at 12/22/2022 1826    Acceptance, E, VU by CL at 12/21/2022 1303    Acceptance, E, VU by EB at 12/20/2022 1644    Acceptance, E, VU by CL at 12/17/2022 1703    Acceptance, E,D, VU,NR by LL at 12/16/2022 1605    Acceptance, E,TB, VU by AC at 12/16/2022 1523    Acceptance, E,TB, VU by AC at 12/15/2022 1618    Acceptance, E,TB, VU by AC at 12/14/2022 1503    Acceptance, E,TB, VU by DL at 12/13/2022 0240    Acceptance, D,E, VU,NR by LL at 12/12/2022 1455    Acceptance, E,TB, VU by RW at 12/9/2022 1451    Acceptance, E,TB, VU by BC at 11/27/2022 1319    Acceptance, E,TB, VU by RM at 11/20/2022 1449    Acceptance, E,TB, VU by RM at 11/18/2022 1021    Acceptance, E,TB, VU by RH at 11/16/2022 1110    Acceptance, E,TB, VU by KK at 11/14/2022 1638    Acceptance, E,TB, VU by KK at 11/13/2022 1648    Acceptance, E,TB, VU by KG at 11/10/2022 1827    Acceptance, E,TB, VU by DM at 11/9/2022 1638    Acceptance, E,TB, VU by KK at 11/8/2022 1754    Acceptance, E,TB, VU by KK at 11/7/2022 1635    Acceptance, E,D, VU,NR by LL at 11/5/2022 1325    Acceptance, E, VU by EB at 11/4/2022 1448    Acceptance, E,TB, VU by KM at 11/4/2022 1101   Family Acceptance, D,E, VU,NR by LL at 12/12/2022 1455                   Point: Precautions (Done)     Learning Progress Summary           Patient Acceptance, E,TB, VU by KK at 1/6/2023 1225    Acceptance, E, VU by CL at 1/5/2023 1414    Acceptance, E,TB, DU,VU by BD at 1/3/2023 0838     Acceptance, TB,E, VU,DU by BD at 1/2/2023 0907    Acceptance, TB,E, VU,DU by BD at 1/1/2023 0846    Acceptance, E,TB, VU by RM at 12/30/2022 1035    Acceptance, E,TB, VU by AC at 12/28/2022 1505    Acceptance, E,TB, VU by AC at 12/27/2022 1513    Acceptance, E, VU by CL at 12/26/2022 1325    Acceptance, E, VU by CL at 12/22/2022 1826    Acceptance, E, VU by CL at 12/21/2022 1303    Acceptance, E, VU by EB at 12/20/2022 1644    Acceptance, E, VU by CL at 12/17/2022 1703    Acceptance, E,D, VU,NR by LL at 12/16/2022 1605    Acceptance, E,TB, VU by AC at 12/16/2022 1523    Acceptance, E,TB, VU by AC at 12/15/2022 1618    Acceptance, E,TB, VU by AC at 12/14/2022 1503    Acceptance, E,TB, VU by DL at 12/13/2022 0240    Acceptance, D,E, VU,NR by LL at 12/12/2022 1455    Acceptance, E,TB, VU by RW at 12/9/2022 1451    Acceptance, E,TB, VU by BC at 11/27/2022 1319    Acceptance, E,TB, VU by RM at 11/20/2022 1449    Acceptance, E,TB, VU by RM at 11/18/2022 1021    Acceptance, E,TB, VU by RH at 11/16/2022 1110    Acceptance, E,TB, VU by KK at 11/14/2022 1638    Acceptance, E,TB, VU by KK at 11/13/2022 1648    Acceptance, E,TB, VU by KG at 11/10/2022 1827    Acceptance, E,TB, VU by DM at 11/9/2022 1638    Acceptance, E,TB, VU by KK at 11/8/2022 1754    Acceptance, E,TB, VU by KK at 11/7/2022 1635    Acceptance, E,D, VU,NR by LL at 11/5/2022 1325    Acceptance, E, VU by EB at 11/4/2022 1448    Acceptance, E,TB, VU by KM at 11/4/2022 1101   Family Acceptance, D,E, VU,NR by LL at 12/12/2022 1455                               User Key     Initials Effective Dates Name Provider Type Discipline    CL 06/16/21 -  Mitzy Mustafa, RN Registered Nurse Nurse    LL 05/02/16 -  Neda Garcia PTA Physical Therapist Assistant PT    RM 06/14/22 -  Bethany Krause RN Registered Nurse Nurse    DM 06/16/21 -  Moniz, Deborah, RN Registered Nurse Nurse    BC 08/02/22 -  Collett, Brittany, RN Registered Nurse Nurse    AC 10/13/22 -  [place],  Jaya, RN Registered Nurse Nurse    BD 08/05/21 -  Radha Lynn, RN Registered Nurse Nurse    KM 05/24/22 -  Mao Vázquez, PT Physical Therapist PT    EB 09/22/22 -  Cedric Oliver, RN Registered Nurse Nurse    KK 06/27/22 -  Chelsi Bolivar, RN Registered Nurse Nurse    DL 03/16/22 -  Francy Garcia, RN Registered Nurse Nurse    KG 09/22/22 -  Verónica Rodriguez, RN Registered Nurse Nurse    RW 09/22/22 -  Bethany Schofield RN Registered Nurse Nurse    RH 08/03/22 - 12/21/22 Jung Russell RN Extern Registered Nurse Nurse              PT Recommendation and Plan  Anticipated Discharge Disposition (PT): home with 24/7 care, skilled nursing [facility]  Planned Therapy Interventions (PT): balance training, bed mobility training, gait training, home exercise program, manual therapy techniques, motor coordination training, neuromuscular re-education, patient/family education, postural re-education, strengthening, transfer training  Therapy Frequency (PT): 5 times/wk  Progress Summary (PT)  Progress Toward Functional Goals (PT): progress toward functional goals is good, prepare for discharge  Plan of Care Reviewed With: patient  Progress: improving       Time Calculation:    PT Charges     Row Name 01/11/23 1459             Time Calculation    Start Time 1250  -CT      Stop Time 1330  -CT      Time Calculation (min) 40 min  -CT      PT Received On 01/11/23  -CT            User Key  (r) = Recorded By, (t) = Taken By, (c) = Cosigned By    Initials Name Provider Type    CT Bhargavi Vásquez, PT Physical Therapist              Therapy Charges for Today     Code Description Service Date Service Provider Modifiers Qty    76005474826 HC GAIT TRAINING EA 15 MIN 1/10/2023 Bhargavi Vásquez, PT GP 1    35632942666 HC PT THER PROC EA 15 MIN 1/10/2023 Bhargavi Vásquez, PT GP 1    46201938983 HC GAIT TRAINING EA 15 MIN 1/11/2023 Bhargavi Vásquez, PT GP 1    [id number] HC PT THERAPEUTIC ACT EA 15 MIN 1/11/2023 Bhargavi Vásquez, PT GP 1     [id number]  PT THER PROC EA 15 MIN 1/11/2023 Bhargavi Vásquez, PT GP 1          PT G-Codes  Outcome Measure Options: AM-PAC 6 Clicks Daily Activity (OT)  AM-PAC 6 Clicks Score (PT): 15  AM-PAC 6 Clicks Score (OT): 19    Bhargavi Vásquez, EDEL  1/11/2023

## 2023-01-11 NOTE — CASE MANAGEMENT/SOCIAL WORK
Discharge Planning Assessment   [facility]     Patient Name: Tre Kinney  MRN: [id number]  Today's Date: 1/11/2023    Admit Date: 10/5/2022    Plan: Pt was admitted to swing bed on 10/5/22. SS was notified by Swing Bed RN, Pt's insurance has issued denial of coverage with the last covered day of 1/12/23. SS to follow and assist as needed.   Discharge Plan     Row Name 01/11/23 1825       Plan    Plan Pt was admitted to swing bed on 10/5/22. SS was notified by Swing Bed RN, Pt's insurance has issued denial of coverage with the last covered day of 1/12/23. SS to follow and assist as needed.                XANDER Craig

## 2023-01-11 NOTE — PLAN OF CARE
Goal Outcome Evaluation:  Plan of Care Reviewed With: patient        Progress: improvingA&O.  Up in chair most all day.  Dr. Hernández here and trimmed toe nails.  Continue plan of care.

## 2023-01-11 NOTE — PLAN OF CARE
Goal Outcome Evaluation:            Patient resting comfortably in bed. No acute changes and no complaints this shift. Will continue to monitor.

## 2023-01-11 NOTE — PROGRESS NOTES
Ms. Kinney is our 89 yo F with hx CHF, chronic kidney disease, coronary artery disease s/p stenting, history of blood clots, chronic anticoagulation with Coumadin, SSS s/p pacemaker placement, chronic diastolic CHF, and iron deficiency anemia who initially presented here after a fall. Patient found to have traumatic hematoma of right hip. Patient found to be anemic requiring 3 units of pRBCs. INR was high on presentation and reversed. Patient had DIANA that stabilized. Patient with chronic vertebral and rib fractures that were evaluated by orthopedic surgery. Patient also had volume overolad requiring diuresis during admission. Patient admitted to swing bed for debility on 10/5/22 and stay has been prolonged by difficult disposition. Apixiban has been restarted, patient tolerating with stable hemoglobin on 1/8 of 10.2. Chart reviewed including nursing notes. No new complaints noted. Patient worked with therapy yesterday. Patient has not had repeat chest pain. We continue to await repeat swing bed evaluation to determine options moving forward for patient and her family. PT/OT/SW following.

## 2023-01-11 NOTE — CASE MANAGEMENT/SOCIAL WORK
Patients insurance has issued denial of coverage the last covered day will be 1/12/23. I have emailed copies of the insurance letters to the patients padmaja Perdomo and left a voice mail.   13:45 Reviewed insurance determination with patient at bedside after discussion with patients padmaja Perdomo.

## 2023-01-11 NOTE — CONSULTS
"Bedside preprocedure diagnosis:qualifying diagnosis for pedal nail care including congestive heart failure with venous stasis edema bilateral, circulating anticoagulant, probable peripheral vascular disease with total deformities 1 through 5 bladder clear onychomycosis onychodystrophy\" mycosis 1 through 5 bilateral pain      Bedside post procedure diagnosis:qualifying diagnosis for pedal nail care including congestive heart failure with venous stasis edema bilateral, circulating anticoagulant, probable peripheral vascular disease with total deformities 1 through 5 bladder clear onychomycosis onychodystrophy\" mycosis 1 through 5 bilateral pain      Bedside procedure #1: Debridement of toenails 6-10 using mycotic nail cassy toenails 1-5 bilateral were debrided in length, height and incurvation at edges. Patient tolerated procedure well.   "

## 2023-01-11 NOTE — CONSULTS
PODIATRIC SURGERY CONSULTATION REPORT      Referring Provider: Primary MD hospitalist  Reason for Consultation: Foot toenail pain bilateral      Principal problem: Generalized weakness    Subjective     History of present illness:    89-year-old  female with history of hx CHF, chronic kidney disease, coronary artery disease s/p stenting, history of blood clots, chronic anticoagulation with Coumadin, SSS s/p pacemaker placement, chronic diastolic CHF, and iron deficiency anemia who initially presented here after a fall. Patient found to have traumatic hematoma of right hip. Patient found to be anemic requiring 3 units of pRBCs. INR was high on presentation and reversed. Patient had DIANA that stabilized. Patient with chronic vertebral and rib fractures that were evaluated by orthopedic surgery. Patient also had volume overolad requiring diuresis during admission. Patient now admitted to swing bed for debility.  Patient is evaluated for bilateral foot toenail pain with history of high risk for nonprofessional care   review of Systems    Constitutional: No fever, chills or night sweats. No appetite change or unexpected weight change. No fatigue.  Eyes: No eye drainage, itching or redness.  HEENT: No mouth sores, dysphagia or nose bleed.  Respiratory: No shortness of breath, cough or production of sputum.  Cardiovascular: + chest pain  Gastrointestinal: +nausea, vomiting or diarrhea. No abdominal pain, hematemesis or rectal bleeding.  Genitourinary: No dysuria or polyuria.  Hematologic/lymphatic: No lymph node abnormalities, no easy bruising or easy bleeding.  Musculoskeletal: No muscle or joint pain. +gait problem  Skin: No rash and no itching.+ Bruisings from recent falls, toenails  Neurological: No loss of consciousness, no seizure, no headache.  Behavioral/Psych: No depression or suicidal ideation.  Endocrine: No hot flashes.  Immunologic: Negative.    Past Medical History    Past Medical History:   Diagnosis  "Date   • Anemia    • Arthritis    • CHF (congestive heart failure) ([facility])    • Chronic kidney disease    • Coronary artery disease    • H/O blood clots    • History of transfusion     no reaction    • Hypertension        Past Surgical History    Past Surgical History:   Procedure Laterality Date   • ABDOMINAL SURGERY     • APPENDECTOMY     • CARDIAC SURGERY     •  SECTION     • COLONOSCOPY N/A 3/7/2018    Procedure: COLONOSCOPY;  Surgeon: Willie Gresham MD;  Location:  [facility];  Service:    • CORONARY ANGIOPLASTY WITH STENT PLACEMENT     • ENDOSCOPY N/A 3/6/2018    Procedure: ESOPHAGOGASTRODUODENOSCOPY;  Surgeon: Willie Gresham MD;  Location:  [facility];  Service:    • PACEMAKER IMPLANTATION      left side    • SKIN BIOPSY     • TUBAL ABDOMINAL LIGATION         Family History    Family History   Problem Relation Age of Onset   • Other Mother         cardiac disorder   • Hypertension Mother    • Heart disease Mother    • Thyroid disease Mother    • Other Father         cardiac disorder   • Heart disease Father    • Hypertension Father    • Other Sister         cardiac disorder   • Other Brother         cardiac disorder   • Other Other         cardiac disorder   • Other Other         cardiac disorder       Social History    Social History     Tobacco Use   • Smoking status: Never   Substance Use Topics   • Alcohol use: No   • Drug use: No       Allergies    Adhesive tape and Xanax [alprazolam]    Objective     /72 (BP Location: Right arm, Patient Position: Lying)   Pulse 73   Temp 98 °F (36.7 °C) (Oral)   Resp 16   Ht 170.2 cm (67.01\")   Wt 76.1 kg (167 lb 11.2 oz)   SpO2 98%   BMI 26.26 kg/m²     Temp:  [97.9 °F (36.6 °C)-98 °F (36.7 °C)] 98 °F (36.7 °C)        Intake/Output Summary (Last 24 hours) at 2023 1705  Last data filed at 2023 0300  Gross per 24 hour   Intake 240 ml   Output --   Net 240 ml         Physical Exam:     General Appearance:  Alert, cooperative, in no " acute distress   Head:  Normocephalic, without obvious abnormality, atraumatic   Eyes:            Lids and lashes normal, conjunctivae and sclerae normal,     no icterus, no pallor, corneas clear, PERRLA   Ears:  Ears appear intact with no abnormalities noted   Throat: No oral lesions, no thrush, oral mucosa moist   Neck: No adenopathy, supple, trachea midline, no thyromegaly, no   carotid bruit, no JVD   Back:   No tenderness to percussion or palpation, range of motion   normal   Lungs:   Clear to auscultation,respirations regular, even and unlabored. No wheezing, no rhonchi and no crackles.    Heart:  Regular rhythm and normal rate, normal S1 and S2, no           murmur, no gallop, no rub, no click   Chest Wall:  No abnormalities observed   Abdomen:   Normal bowel sounds, no masses, no organomegaly, soft       non tender, non distended, no guarding, no rebound tenderness   Rectal:   Deferred   Pedal extremities:  Thin atrophic skin decrease hair growth warm extremity there is thickened is colored onychomycotic dystrophic hyper trophic toenails 1 through 5 bilateral with pain with pressure   Pulses:  Decreased pulses DP and PT  bilaterally   Skin: No bleeding, bruising or rash   Lymph nodes: No palpable adenopathy   Neurologic: Awake, alert and oriented x 3. Following commands.       Results:    Results from last 7 days   Lab Units 01/08/23  0332   WBC 10*3/mm3 6.98     Lab Results   Component Value Date    NEUTROABS 4.09 01/08/2023       Results from last 7 days   Lab Units 01/08/23  0332   CREATININE mg/dL 1.97*             Imaging Results (Last 24 Hours)     ** No results found for the last 24 hours. **            Results Review:    I have personally reviewed laboratory data, culture results, radiology studies and antimicrobial therapy.    Hospital Medications (active)       Dose Frequency Start End    acetaminophen (TYLENOL) tablet 650 mg 650 mg Every 6 Hours PRN 11/26/2022     Admin Instructions: Based on  patient request - if ordered for moderate or severe pain, provider allows for administration of a medication prescribed for a lower pain scale.  Do not exceed 4 grams of acetaminophen in a 24 hr period. Max dose of 2gm for AST/ALT greater than 120 units/L    If given for fever, use fever parameter: fever greater than 100.4 °F.    If given for pain, use the following pain scale:   Mild Pain = Pain Score of 1-3, CPOT 1-2  Moderate Pain = Pain Score of 4-6, CPOT 3-4  Severe Pain = Pain Score of 7-10, CPOT 5-8    Route: Oral    apixaban (ELIQUIS) tablet 5 mg 5 mg Every 12 Hours Scheduled 10/27/2022     Admin Instructions: Tablet may be crushed and suspended in 60 mL of water or D5W and immediately delivered via NG tube.    Route: Oral    benzocaine-menthol (DERMOPLAST) 20-0.5 % topical spray  4 Times Daily PRN 11/2/2022     Admin Instructions: Apply to shoulder.  Send aerosols to pharmacy in ziplock bag for proper disposal Based on patient request - if ordered for moderate or severe pain, provider allows for administration of a medication prescribed for a lower pain scale.  If given for pain, use the following pain scale:  Mild Pain = Pain Score of 1-3, CPOT 1-2  Moderate Pain = Pain Score of 4-6, CPOT 3-4  Severe Pain = Pain Score of 7-10, CPOT 5-8    Route: Topical    calcium carbonate (TUMS) chewable tablet 500 mg (200 mg elemental) 2 tablet 3 Times Daily PRN 12/31/2022     Admin Instructions: One tablet contains 200 mg elemental calcium.  Take with food.    Route: Oral    Diclofenac Sodium (VOLTAREN) 1 % gel 4 g 4 g 4 Times Daily 12/4/2022     Admin Instructions: Apply to right knee.   Do not cover area with occlusive dressing or apply any other med to affected area. Do not wash affected area for 1 hr after applying. Use dosing card for correct dose measurement.    Route: Topical    docusate sodium (COLACE) capsule 100 mg 100 mg 2 Times Daily 12/13/2022     Admin Instructions: Swallow whole.  Do not open, crush, or  chew capsule.    Route: Oral    escitalopram (LEXAPRO) tablet 10 mg 10 mg Daily 10/6/2022     Admin Instructions: Caution: Look alike/sound alike drug alert.    Route: Oral    furosemide (LASIX) tablet 40 mg 40 mg 2 Times Daily (Diuretics) 10/5/2022     Route: Oral    gabapentin (NEURONTIN) capsule 300 mg 300 mg Nightly 10/5/2022     Admin Instructions:     Route: Oral    Glycerin (INDRA/PED) suppository 1 g 1 g Every 12 Hours PRN 12/13/2022     Route: Rectal    guaiFENesin-dextromethorphan (ROBITUSSIN DM) 100-10 MG/5ML syrup 5 mL 5 mL Every 4 Hours PRN 11/5/2022     Admin Instructions: Caution: Look alike/sound alike drug alert    Route: Oral    hydrALAZINE (APRESOLINE) injection 10 mg 10 mg Every 6 Hours PRN 11/14/2022     Admin Instructions: As needed for SBP greater than 170  Caution: Look alike/sound alike drug alert    Route: Intravenous    HYDROcodone-acetaminophen (NORCO) 7.5-325 MG per tablet 1 tablet 1 tablet Every 8 Hours PRN 10/5/2022     Admin Instructions: Based on patient request - if ordered for moderate or severe pain, provider allows for administration of a medication prescribed for a lower pain scale.  [GIOVANNI]    Do not exceed 4 grams of acetaminophen in a 24 hr period. Max dose of 2gm for AST/ALT greater than 120 units/L        If given for pain, use the following pain scale:   Mild Pain = Pain Score of 1-3, CPOT 1-2  Moderate Pain = Pain Score of 4-6, CPOT 3-4  Severe Pain = Pain Score of 7-10, CPOT 5-8    Route: Oral    isosorbide mononitrate (IMDUR) 24 hr tablet 30 mg 30 mg Every 24 Hours Scheduled 12/31/2022     Admin Instructions: Do not crush, or chew.    Route: Oral    magnesium oxide (MAG-OX) tablet 400 mg 400 mg Daily 12/14/2022     Admin Instructions: Each 400mg of magnesium oxide is equal to 241.3mg of elemental magnesium.    Route: Oral    Magnesium Sulfate 2 gram / 50mL Infusion (GIVE X 3 BAGS TO EQUAL 6GM TOTAL DOSE) - Mg 1.1 - 1.5 mg/dl 2 g As Needed 10/21/2022     Admin Instructions:  Mg 1.1 -1.5 mg/dL. Infuse 2 grams over 2 hours for 3 doses (for a total Mg dose of 6 grams).  Recheck Mg level in the AM.    Route: Intravenous    Linked Group 1: See Hyperspace for full Linked Orders Report.        Magnesium Sulfate 2 gram Bolus, followed by 8 gram infusion (total Mg dose 10 grams)- Mg less than or equal to 1mg/dL 2 g As Needed 10/21/2022     Admin Instructions: Mg less than or equal to 1mg/dL. Give 2 gm over 30 minutes as bolus, then infuse 2 gm over 2 hours for 4 doses (8 grams) for total dose of 10 grams.  Recheck Mg levels in the AM.    Route: Intravenous    Linked Group 1: See Hyperspace for full Linked Orders Report.        Magnesium Sulfate 4 gram infusion- Mg 1.6-1.9 mg/dL 4 g As Needed 10/21/2022     Admin Instructions: Mg 1.6-1.9 mg/dL. Recheck Mg level in the AM.    Route: Intravenous    Linked Group 1: See Hyperspace for full Linked Orders Report.        melatonin tablet 10 mg 10 mg Nightly 10/5/2022     Route: Oral    metoprolol tartrate (LOPRESSOR) tablet 25 mg 25 mg Every 12 Hours Scheduled 11/15/2022     Admin Instructions: Hold for sbp <110 and/or hr <60    Route: Oral    multivitamin (THERAGRAN) tablet 1 tablet 1 tablet Daily 10/21/2022     Admin Instructions:     Route: Oral    ondansetron (ZOFRAN) injection 4 mg 4 mg Every 6 Hours PRN 10/5/2022     Route: Intravenous    pantoprazole (PROTONIX) EC tablet 40 mg 40 mg Every Early Morning 10/6/2022     Admin Instructions: Swallow whole; do not crush, split, or chew.    Route: Oral    phenol (CHLORASEPTIC) 1.4 % liquid 1 spray 1 spray Every 2 Hours PRN 10/5/2022     Admin Instructions:     Route: Mouth/Throat    polyethylene glycol (MIRALAX) packet 17 g 17 g 2 Times Daily PRN 10/5/2022     Admin Instructions: Use 4-8 ounces of water, tea, or juice for each 17 gram dose.    Route: Oral    potassium chloride (K-DUR,KLOR-CON) CR tablet 40 mEq 40 mEq As Needed 10/21/2022     Admin Instructions: Potassium 3.1 or Less Give KCl 40 mEq q4h  x3 Doses   Potassium 3.2 - 3.6 Give KCl 40 mEq q4h x2 Doses     Check Potassium 4 Hours After Last Dose Given   Check Magnesium if Potassium Level Remains Low After Replacement   DO NOT GIVE if CrCl is Less Than 30 mL/minute or Urine Output Less Than 30 mL/hr    Route: Oral    Linked Group 2: See Hyperspace for full Linked Orders Report.        potassium chloride (KLOR-CON) packet 40 mEq 40 mEq As Needed 10/21/2022     Admin Instructions: Potassium 3.1 or Less Give KCl 40 mEq q4h x3 Doses   Potassium 3.2 - 3.6 Give KCl 40 mEq q4h x2 Doses     Check Potassium 4 Hours After Last Dose Given   Check Magnesium if Potassium Level Remains Low After Replacement   DO NOT GIVE if CrCl is Less Than 30 mL/minute or Urine Output Less Than 30 mL/hr    Route: Oral    Linked Group 2: See Hyperspace for full Linked Orders Report.        potassium chloride 10 mEq in 100 mL IVPB 10 mEq Every 1 Hour PRN 10/21/2022     Admin Instructions: Peripheral or Central IV  Potassium 3.1 or Less Give KCl 10 mEq/100 mL NS IV q1h x6 Doses  Potassium 3.2 - 3.6 Give KCl 10 mEq/100 mL NS q1h x4 Doses    Check Potassium 4 Hours After Last Dose Given  Check Magnesium if Potassium Remains Low After Replacement  DO NOT GIVE if CrCl is Less Than 30 mL/minute or Urine Output Less Than 30 mL/hr.     Rates Greater Than 10 mEq/hr Require ECG Monitoring.  OUTPATIENT/NON-MONITORED UNITS: Potassium Chloride standard bolus infusion rate is a maximum of 10 mEq/hr on unmonitored patients    MONITORED UNITS: Potassium Chloride standard bolus infusion rate is a maximum of 20 mEq/hr on ECG monitored patients ONLY      Route: Intravenous    Linked Group 2: See Hyperspace for full Linked Orders Report.        prochlorperazine (COMPAZINE) injection 5 mg 5 mg Every 6 Hours PRN 10/5/2022     Route: Intravenous    Linked Group 3: See Hyperspace for full Linked Orders Report.        prochlorperazine (COMPAZINE) suppository 25 mg 25 mg Every 12 Hours PRN 10/5/2022     Route:  Rectal    Linked Group 3: See Hyperspace for full Linked Orders Report.        prochlorperazine (COMPAZINE) tablet 5 mg 5 mg Every 6 Hours PRN 10/5/2022     Route: Oral    Linked Group 3: See Hyperspace for full Linked Orders Report.        promethazine (PHENERGAN) 6.25 mg in sodium chloride 0.9 % 50 mL 6.25 mg Every 6 Hours PRN 10/5/2022     Admin Instructions: If multiple antiemetics ordered, give antiemetics in this order: ondansetron, prochlorperazine, promethazine.    Route: Intravenous    sodium chloride 0.9 % flush 10 mL 10 mL As Needed 10/5/2022     Route: Intravenous    sodium chloride 0.9 % flush 10 mL 10 mL Every 12 Hours Scheduled 10/5/2022     Route: Intravenous    sodium chloride 0.9 % flush 10 mL 10 mL As Needed 10/5/2022     Route: Intravenous    sodium chloride 0.9 % flush 10 mL 10 mL Every 12 Hours Scheduled 10/5/2022     Route: Intravenous    sodium chloride 0.9 % flush 10 mL 10 mL As Needed 10/5/2022     Route: Intravenous    tamsulosin (FLOMAX) 24 hr capsule 0.4 mg 0.4 mg Daily 10/18/2022     Admin Instructions: Swallow whole. Do not crush or chew.    Route: Oral            PROBLEM LIST:    Patient Active Problem List   Diagnosis   • Acid reflux   • Ankle arthralgia   • Cardiac conduction disorder   • Closed fracture of distal fibula   • Arteriosclerosis of coronary artery   • Fracture of distal end of tibia   • Elevated cholesterol   • BP (high blood pressure)   • Hypertrophic polyarthritis   • OP (osteoporosis)   • Right heart failure (HCC)   • Heart failure, chronic, right-sided (HCC)   • SOB (shortness of breath)   • Palpitations   • CHF (congestive heart failure) (HCC)   • Congestive heart failure (HCC)   • Precordial pain   • Chronic anticoagulation   • Hypokalemia   • Anemia, unspecified type   • Generalized weakness       Assessment and Plan:    Toenail deformities 1 through 5 bilateral [place] mycosis onychodystrophy 1 through 5 bilateral with pain.  Comorbidities per above  including qualifying include chronic kidney disease, circulating anticoagulant, congestive heart failure with stasis edema lower extremity    PLAN:    Plan debridement toenails 1 through 5 bilateral.  Advised compression stockings.  Follow-up with PCP in outpatient podiatry.      Patient's findings and recommendations were discussed with patient    Code Status:   Code Status and Medical Interventions:   Ordered at: 10/05/22 1253     Medical Intervention Limits:    NO intubation (DNI)     Level Of Support Discussed With:    Patient    Next of Kin (If No Surrogate)     Code Status (Patient has no pulse and is not breathing):    No CPR (Do Not Attempt to Resuscitate)     Medical Interventions (Patient has pulse or is breathing):    Limited Support     Comments:    Patient states that she does not want to be resuscitated or put on a ventilator, her son Leno supports her decision       Hay Hernández MD  01/11/23  17:05 EST

## 2023-01-12 PROCEDURE — 97116 GAIT TRAINING THERAPY: CPT

## 2023-01-12 PROCEDURE — 97110 THERAPEUTIC EXERCISES: CPT

## 2023-01-12 RX ADMIN — METOPROLOL TARTRATE 25 MG: 25 TABLET, FILM COATED ORAL at 20:53

## 2023-01-12 RX ADMIN — PANTOPRAZOLE SODIUM 40 MG: 40 TABLET, DELAYED RELEASE ORAL at 04:50

## 2023-01-12 RX ADMIN — ISOSORBIDE MONONITRATE 30 MG: 30 TABLET, EXTENDED RELEASE ORAL at 08:22

## 2023-01-12 RX ADMIN — ACETAMINOPHEN 650 MG: 325 TABLET, FILM COATED ORAL at 04:50

## 2023-01-12 RX ADMIN — DICLOFENAC 4 G: 10 GEL TOPICAL at 20:53

## 2023-01-12 RX ADMIN — APIXABAN 5 MG: 5 TABLET, FILM COATED ORAL at 20:53

## 2023-01-12 RX ADMIN — METOPROLOL TARTRATE 25 MG: 25 TABLET, FILM COATED ORAL at 08:23

## 2023-01-12 RX ADMIN — FUROSEMIDE 40 MG: 40 TABLET ORAL at 18:01

## 2023-01-12 RX ADMIN — APIXABAN 5 MG: 5 TABLET, FILM COATED ORAL at 08:22

## 2023-01-12 RX ADMIN — ESCITALOPRAM 10 MG: 10 TABLET, FILM COATED ORAL at 08:22

## 2023-01-12 RX ADMIN — GABAPENTIN 300 MG: 300 CAPSULE ORAL at 20:53

## 2023-01-12 RX ADMIN — DICLOFENAC 4 G: 10 GEL TOPICAL at 18:02

## 2023-01-12 RX ADMIN — FUROSEMIDE 40 MG: 40 TABLET ORAL at 08:22

## 2023-01-12 RX ADMIN — TAMSULOSIN HYDROCHLORIDE 0.4 MG: 0.4 CAPSULE ORAL at 08:22

## 2023-01-12 RX ADMIN — MAGNESIUM GLUCONATE 500 MG ORAL TABLET 400 MG: 500 TABLET ORAL at 08:23

## 2023-01-12 RX ADMIN — DICLOFENAC 4 G: 10 GEL TOPICAL at 08:23

## 2023-01-12 RX ADMIN — DOCUSATE SODIUM 100 MG: 100 CAPSULE ORAL at 20:53

## 2023-01-12 RX ADMIN — Medication 10 MG: at 20:53

## 2023-01-12 RX ADMIN — Medication 1 TABLET: at 08:22

## 2023-01-12 NOTE — PLAN OF CARE
Goal Outcome Evaluation:           Progress: no change  Outcome Evaluation: Pt resting in bed w/ no complaints at this time. No acute changes noted. Will cont POC

## 2023-01-12 NOTE — PROGRESS NOTES
Ms. Kinney is our 89 yo F with hx CHF, chronic kidney disease, coronary artery disease s/p stenting, history of blood clots, chronic anticoagulation with Coumadin, SSS s/p pacemaker placement, chronic diastolic CHF, and iron deficiency anemia who initially presented here after a fall. Patient found to have traumatic hematoma of right hip. Patient found to be anemic requiring 3 units of pRBCs. INR was high on presentation and reversed. Patient had DIANA that stabilized. Patient with chronic vertebral and rib fractures that were evaluated by orthopedic surgery. Patient also had volume overolad requiring diuresis during admission. Patient admitted to swing bed for debility on 10/5/22 and stay has been prolonged by difficult disposition. Apixiban has been restarted, patient tolerating with stable hemoglobin on 1/8 of 10.2. Chart reviewed including nursing notes. No new complaints noted. Patient worked with therapy yesterday. Patient has not had repeat chest pain. Podiatry consulted at patient's request yesterday to cut her nails. Swing bed did not approve more days. Staff discussed with patient and family and they are planning on returning home on 1/13.  PT/OT/SW following

## 2023-01-12 NOTE — THERAPY TREATMENT NOTE
Acute Care - Occupational Therapy Treatment Note   [place]     Patient Name: Tre Kinney  : 1932  MRN: [id number]  Today's Date: 2023  Onset of Illness/Injury or Date of Surgery: 10/06/22     Referring Physician: Janneth    Admit Date: 10/5/2022     No diagnosis found.  Patient Active Problem List   Diagnosis   • Acid reflux   • Ankle arthralgia   • Cardiac conduction disorder   • Closed fracture of distal fibula   • Arteriosclerosis of coronary artery   • Fracture of distal end of tibia   • Elevated cholesterol   • BP (high blood pressure)   • Hypertrophic polyarthritis   • OP (osteoporosis)   • Right heart failure (HCC)   • Heart failure, chronic, right-sided (HCC)   • SOB (shortness of breath)   • Palpitations   • CHF (congestive heart failure) (HCC)   • Congestive heart failure (HCC)   • Precordial pain   • Chronic anticoagulation   • Hypokalemia   • Anemia, unspecified type   • Generalized weakness     Past Medical History:   Diagnosis Date   • Anemia    • Arthritis    • CHF (congestive heart failure) ([facility])    • Chronic kidney disease    • Coronary artery disease    • H/O blood clots    • History of transfusion     no reaction    • Hypertension      Past Surgical History:   Procedure Laterality Date   • ABDOMINAL SURGERY     • APPENDECTOMY     • CARDIAC SURGERY     •  SECTION     • COLONOSCOPY N/A 3/7/2018    Procedure: COLONOSCOPY;  Surgeon: Willie Gresham MD;  Location:  [facility];  Service:    • CORONARY ANGIOPLASTY WITH STENT PLACEMENT     • ENDOSCOPY N/A 3/6/2018    Procedure: ESOPHAGOGASTRODUODENOSCOPY;  Surgeon: Willie Gresham MD;  Location:  [facility];  Service:    • PACEMAKER IMPLANTATION      left side    • SKIN BIOPSY     • TUBAL ABDOMINAL LIGATION           OT ASSESSMENT FLOWSHEET (last 12 hours)     OT Evaluation and Treatment     Row Name 23 1516                   OT Time and Intention    Subjective Information no complaints  -LM        Document Type  therapy note (daily note)  -LM        Mode of Treatment occupational therapy  -LM        Patient Effort good  -LM        Comment Patient seen this date for light bue arom/therex.  Patient performed light yellow tband and towel therex with frequent rest breaks.  Patient completed bue arom/therex sitting in bedside recliner.  Discussed adl education/recommendations with patient and son via facetime earlier in am.  recommend to perform BADL while seated with min/mod assist for safety.  Min/mod assist with toilet transfers and toileting with RW and min;/mod assist.  Patient and son verbalized understanding of adl recommendations and fxl mobility recommendations.  Patient states having bsc, hb, rw, w/c.  -LM           General Information    Existing Precautions/Restrictions fall  -LM           Cognition    Affect/Mental Status (Cognition) WFL  -LM        Orientation Status (Cognition) oriented x 4  -LM           Positioning and Restraints    Post Treatment Position chair  -LM        In Bed call light within reach;encouraged to call for assist  -LM              User Key  (r) = Recorded By, (t) = Taken By, (c) = Cosigned By    Initials Name Effective Dates    LM Татьяна Bah, OT 06/16/21 -                        OT Recommendation and Plan           Outcome Measures     Row Name 01/12/23 1521 01/11/23 1500          How much help from another is currently needed...    Putting on and taking off regular lower body clothing? 3  -LM 3  -LM     Bathing (including washing, rinsing, and drying) 3  -LM 3  -LM     Toileting (which includes using toilet bed pan or urinal) 3  -LM 3  -LM     Putting on and taking off regular upper body clothing 3  -LM 3  -LM     Taking care of personal grooming (such as brushing teeth) 3  -LM 3  -LM     Eating meals 4  -LM 4  -LM     AM-PAC 6 Clicks Score (OT) 19  -LM 19  -LM        Functional Assessment    Outcome Measure Options AM-PAC 6 Clicks Daily Activity (OT)  -LM AM-PAC 6 Clicks Daily Activity  (OT)  -LM           User Key  (r) = Recorded By, (t) = Taken By, (c) = Cosigned By    Initials Name Provider Type    Татьяна Mccabe, OT Occupational Therapist                Time Calculation:    Time Calculation- OT     Row Name 01/12/23 1522             Time Calculation- OT    Total Timed Code Minutes- OT 15 minute(s)  -LM            User Key  (r) = Recorded By, (t) = Taken By, (c) = Cosigned By    Initials Name Provider Type    Татьяна Mccabe, OT Occupational Therapist              Therapy Charges for Today     Code Description Service Date Service Provider Modifiers Qty    44367536755 HC OT THER PROC EA 15 MIN 1/11/2023 Татьяна Bah, OT GO 1    47596746088 HC OT THER PROC EA 15 MIN 1/12/2023 Татьяна Bah OT GO 1               Татьяна Bah OT  1/12/2023

## 2023-01-12 NOTE — THERAPY TREATMENT NOTE
Acute Care - Physical Therapy Treatment Note   Scot     Patient Name: Tre Kinney  : 1932  MRN: [id number]  Today's Date: 2023   Onset of Illness/Injury or Date of Surgery: 10/06/22  Visit Dx:   No diagnosis found.  Patient Active Problem List   Diagnosis   • Acid reflux   • Ankle arthralgia   • Cardiac conduction disorder   • Closed fracture of distal fibula   • Arteriosclerosis of coronary artery   • Fracture of distal end of tibia   • Elevated cholesterol   • BP (high blood pressure)   • Hypertrophic polyarthritis   • OP (osteoporosis)   • Right heart failure (HCC)   • Heart failure, chronic, right-sided (HCC)   • SOB (shortness of breath)   • Palpitations   • CHF (congestive heart failure) (HCC)   • Congestive heart failure (HCC)   • Precordial pain   • Chronic anticoagulation   • Hypokalemia   • Anemia, unspecified type   • Generalized weakness     Past Medical History:   Diagnosis Date   • Anemia    • Arthritis    • CHF (congestive heart failure) (HCC)    • Chronic kidney disease    • Coronary artery disease    • H/O blood clots    • History of transfusion     no reaction    • Hypertension      Past Surgical History:   Procedure Laterality Date   • ABDOMINAL SURGERY     • APPENDECTOMY     • CARDIAC SURGERY     •  SECTION     • COLONOSCOPY N/A 3/7/2018    Procedure: COLONOSCOPY;  Surgeon: Willie Gresham MD;  Location:  [facility];  Service:    • CORONARY ANGIOPLASTY WITH STENT PLACEMENT     • ENDOSCOPY N/A 3/6/2018    Procedure: ESOPHAGOGASTRODUODENOSCOPY;  Surgeon: Willie Gresham MD;  Location:  [facility];  Service:    • PACEMAKER IMPLANTATION      left side    • SKIN BIOPSY     • TUBAL ABDOMINAL LIGATION       PT Assessment (last 12 hours)     PT Evaluation and Treatment     Row Name 23 1040          Physical Therapy Time and Intention    Subjective Information no complaints  -CT     Document Type therapy note (daily note)  -CT     Mode of Treatment physical  therapy;individual therapy  -CT     Patient Effort good  -CT     Comment Pts treatment session [id number]. [place] via [place]. Pt ambulate x 2 and performed seated and standing ther ex. Anticipated d/c from PT services 10/6. Pt and family educated on use of RW and HEP.  -CT     Row Name 01/12/23 1040          General Information    Patient Profile Reviewed yes  -CT     Row Name 01/12/23 1040          Cognition    Affect/Mental Status (Cognition) WFL  -CT     Orientation Status (Cognition) oriented x 4  -CT     Follows Commands (Cognition) WFL  -CT     Row Name 01/12/23 1040          Transfers    Transfers sit-stand transfer;stand-sit transfer  -CT     Row Name 01/12/23 1040          Sit-Stand Transfer    Sit-Stand [place] (Transfers) contact guard  -CT     Assistive Device (Sit-Stand Transfers) walker, front-wheeled  -CT     Row Name 01/12/23 1040          Stand-Sit Transfer    Stand-Sit [place] (Transfers) contact guard  -CT     Assistive Device (Stand-Sit Transfers) walker, front-wheeled  -CT     Row Name 01/12/23 1040          Gait/Stairs (Locomotion)    [place] Level (Gait) contact guard  -CT     Assistive Device (Gait) walker, front-wheeled  -CT     Distance in Feet (Gait) 50 x 2  -CT     Pattern (Gait) step-to  -CT     Deviations/Abnormal Patterns (Gait) stride length decreased;gait speed decreased;festinating/shuffling;weight shifting decreased  -CT     Bilateral Gait Deviations weight shift ability decreased;forward flexed posture  -CT     Row Name 01/12/23 1040          Motor Skills    Therapeutic Exercise --  BLE seated ther ex: seated marches, LAQ, AP, hip abd/add, glut sets; standing: standing marches, heel raises, mini squats. Pt educated on HEP.  -CT     Row Name 01/12/23 1040          Coping    Observed Emotional State calm;cooperative;pleasant  -CT     Verbalized Emotional State acceptance  -CT     Row Name 01/12/23 1040          Plan of Care Review    Plan of Care Reviewed With  patient;son  -CT     Row Name 01/12/23 1040          Positioning and Restraints    Pre-Treatment Position sitting in chair/recliner  -CT     Post Treatment Position chair  -CT     In Chair sitting;call light within reach;encouraged to call for assist  -CT           User Key  (r) = Recorded By, (t) = Taken By, (c) = Cosigned By    Initials Name Provider Type    CT Bhargavi Vásquez PT Physical Therapist                Physical Therapy Education     Title: PT OT SLP Therapies (Done)     Topic: Physical Therapy (Done)     Point: Mobility training (Done)     Learning Progress Summary           Patient Acceptance, E,TB, VU by KK at 1/6/2023 1225    Acceptance, E, VU by CL at 1/5/2023 1414    Acceptance, E,TB, DU,VU by BD at 1/3/2023 0838    Acceptance, TB,E, VU,DU by BD at 1/2/2023 0907    Acceptance, TB,E, VU,DU by BD at 1/1/2023 0846    Acceptance, E,TB, VU by RM at 12/30/2022 1035    Acceptance, E,TB, VU by AC at 12/28/2022 1505    Acceptance, E,TB, VU by AC at 12/27/2022 1513    Acceptance, E, VU by CL at 12/26/2022 1325    Acceptance, E, VU by CL at 12/22/2022 1826    Acceptance, E, VU by CL at 12/21/2022 1303    Acceptance, E, VU by EB at 12/20/2022 1644    Acceptance, E, VU by CL at 12/17/2022 1703    Acceptance, E,D, VU,NR by LL at 12/16/2022 1605    Acceptance, E,TB, VU by AC at 12/16/2022 1523    Acceptance, E,TB, VU by AC at 12/15/2022 1618    Acceptance, E,TB, VU by AC at 12/14/2022 1503    Acceptance, E,TB, VU by DL at 12/13/2022 0240    Acceptance, D,E, VU,NR by LL at 12/12/2022 1455    Acceptance, E,TB, VU by RW at 12/9/2022 1451    Acceptance, E,TB, VU by BC at 11/27/2022 1319    Acceptance, E,TB, VU by RM at 11/20/2022 1449    Acceptance, E,TB, VU by RM at 11/18/2022 1021    Acceptance, E,TB, VU by RH at 11/16/2022 1110    Acceptance, E,TB, VU by KK at 11/14/2022 1638    Acceptance, E,TB, VU by KK at 11/13/2022 1648    Acceptance, E,TB, VU by KG at 11/10/2022 1827    Acceptance, E,TB, VU by DM at  11/9/2022 1638    Acceptance, E,TB, VU by KK at 11/8/2022 1754    Acceptance, E,TB, VU by KK at 11/7/2022 1635    Acceptance, E,D, VU,NR by LL at 11/5/2022 1325    Acceptance, E, VU by EB at 11/4/2022 1448    Acceptance, E,TB, VU by KM at 11/4/2022 1101   Family Acceptance, D,E, VU,NR by LL at 12/12/2022 1455                   Point: Home exercise program (Done)     Learning Progress Summary           Patient Acceptance, E,TB, VU by KK at 1/6/2023 1225    Acceptance, E, VU by CL at 1/5/2023 1414    Acceptance, E,TB, DU,VU by BD at 1/3/2023 0838    Acceptance, TB,E, VU,DU by BD at 1/2/2023 0907    Acceptance, TB,E, VU,DU by BD at 1/1/2023 0846    Acceptance, E,TB, VU by RM at 12/30/2022 1035    Acceptance, E,TB, VU by AC at 12/28/2022 1505    Acceptance, E,TB, VU by AC at 12/27/2022 1513    Acceptance, E, VU by CL at 12/26/2022 1325    Acceptance, E, VU by CL at 12/22/2022 1826    Acceptance, E, VU by CL at 12/21/2022 1303    Acceptance, E, VU by EB at 12/20/2022 1644    Acceptance, E, VU by CL at 12/17/2022 1703    Acceptance, E,D, VU,NR by LL at 12/16/2022 1605    Acceptance, E,TB, VU by AC at 12/16/2022 1523    Acceptance, E,TB, VU by AC at 12/15/2022 1618    Acceptance, E,TB, VU by AC at 12/14/2022 1503    Acceptance, E,TB, VU by DL at 12/13/2022 0240    Acceptance, D,E, VU,NR by LL at 12/12/2022 1455    Acceptance, E,TB, VU by RW at 12/9/2022 1451    Acceptance, E,TB, VU by BC at 11/27/2022 1319    Acceptance, E,TB, VU by RM at 11/20/2022 1449    Acceptance, E,TB, VU by RM at 11/18/2022 1021    Acceptance, E,TB, VU by RH at 11/16/2022 1110    Acceptance, E,TB, VU by KK at 11/14/2022 1638    Acceptance, E,TB, VU by KK at 11/13/2022 1648    Acceptance, E,TB, VU by KG at 11/10/2022 1827    Acceptance, E,TB, VU by DM at 11/9/2022 1638    Acceptance, E,TB, VU by KK at 11/8/2022 1754    Acceptance, E,TB, VU by KK at 11/7/2022 1635    Acceptance, E,D, VU,NR by LL at 11/5/2022 1325    Acceptance, E, VU by EB at  11/4/2022 1448    Acceptance, E,TB, VU by KM at 11/4/2022 1101   Family Acceptance, D,E, VU,NR by LL at 12/12/2022 1455                   Point: Body mechanics (Done)     Learning Progress Summary           Patient Acceptance, E,TB, VU by KK at 1/6/2023 1225    Acceptance, E, VU by CL at 1/5/2023 1414    Acceptance, E,TB, DU,VU by BD at 1/3/2023 0838    Acceptance, TB,E, VU,DU by BD at 1/2/2023 0907    Acceptance, TB,E, VU,DU by BD at 1/1/2023 0846    Acceptance, E,TB, VU by RM at 12/30/2022 1035    Acceptance, E,TB, VU by AC at 12/28/2022 1505    Acceptance, E,TB, VU by AC at 12/27/2022 1513    Acceptance, E, VU by CL at 12/26/2022 1325    Acceptance, E, VU by CL at 12/22/2022 1826    Acceptance, E, VU by CL at 12/21/2022 1303    Acceptance, E, VU by EB at 12/20/2022 1644    Acceptance, E, VU by CL at 12/17/2022 1703    Acceptance, E,D, VU,NR by LL at 12/16/2022 1605    Acceptance, E,TB, VU by AC at 12/16/2022 1523    Acceptance, E,TB, VU by AC at 12/15/2022 1618    Acceptance, E,TB, VU by AC at 12/14/2022 1503    Acceptance, E,TB, VU by DL at 12/13/2022 0240    Acceptance, D,E, VU,NR by LL at 12/12/2022 1455    Acceptance, E,TB, VU by RW at 12/9/2022 1451    Acceptance, E,TB, VU by BC at 11/27/2022 1319    Acceptance, E,TB, VU by RM at 11/20/2022 1449    Acceptance, E,TB, VU by RM at 11/18/2022 1021    Acceptance, E,TB, VU by RH at 11/16/2022 1110    Acceptance, E,TB, VU by KK at 11/14/2022 1638    Acceptance, E,TB, VU by KK at 11/13/2022 1648    Acceptance, E,TB, VU by KG at 11/10/2022 1827    Acceptance, E,TB, VU by DM at 11/9/2022 1638    Acceptance, E,TB, VU by KK at 11/8/2022 1754    Acceptance, E,TB, VU by KK at 11/7/2022 1635    Acceptance, E,D, VU,NR by LL at 11/5/2022 1325    Acceptance, E, VU by EB at 11/4/2022 1448    Acceptance, E,TB, VU by KM at 11/4/2022 1101   Family Acceptance, D,E, VU,NR by LL at 12/12/2022 1455                   Point: Precautions (Done)     Learning Progress Summary            Patient Acceptance, E,TB, VU by KK at 1/6/2023 1225    Acceptance, E, VU by CL at 1/5/2023 1414    Acceptance, E,TB, DU,VU by BD at 1/3/2023 0838    Acceptance, TB,E, VU,DU by BD at 1/2/2023 0907    Acceptance, TB,E, VU,DU by BD at 1/1/2023 0846    Acceptance, E,TB, VU by RM at 12/30/2022 1035    Acceptance, E,TB, VU by AC at 12/28/2022 1505    Acceptance, E,TB, VU by AC at 12/27/2022 1513    Acceptance, E, VU by CL at 12/26/2022 1325    Acceptance, E, VU by CL at 12/22/2022 1826    Acceptance, E, VU by CL at 12/21/2022 1303    Acceptance, E, VU by EB at 12/20/2022 1644    Acceptance, E, VU by CL at 12/17/2022 1703    Acceptance, E,D, VU,NR by LL at 12/16/2022 1605    Acceptance, E,TB, VU by AC at 12/16/2022 1523    Acceptance, E,TB, VU by AC at 12/15/2022 1618    Acceptance, E,TB, VU by AC at 12/14/2022 1503    Acceptance, E,TB, VU by DL at 12/13/2022 0240    Acceptance, D,E, VU,NR by LL at 12/12/2022 1455    Acceptance, E,TB, VU by RW at 12/9/2022 1451    Acceptance, E,TB, VU by BC at 11/27/2022 1319    Acceptance, E,TB, VU by RM at 11/20/2022 1449    Acceptance, E,TB, VU by RM at 11/18/2022 1021    Acceptance, E,TB, VU by RH at 11/16/2022 1110    Acceptance, E,TB, VU by KK at 11/14/2022 1638    Acceptance, E,TB, VU by KK at 11/13/2022 1648    Acceptance, E,TB, VU by KG at 11/10/2022 1827    Acceptance, E,TB, VU by DM at 11/9/2022 1638    Acceptance, E,TB, VU by KK at 11/8/2022 1754    Acceptance, E,TB, VU by KK at 11/7/2022 1635    Acceptance, E,D, VU,NR by LL at 11/5/2022 1325    Acceptance, E, VU by EB at 11/4/2022 1448    Acceptance, E,TB, VU by KM at 11/4/2022 1101   Family Acceptance, D,E, VU,NR by LL at 12/12/2022 1455                               User Key     Initials Effective Dates Name Provider Type Discipline    CL 06/16/21 -  Mitzy Mustafa, RN Registered Nurse Nurse    LL 05/02/16 -  Neda Garcia PTA Physical Therapist Assistant PT    RM 06/14/22 -  Bethany Krause, RN Registered Nurse  Nurse    DM 06/16/21 -  Moniz, Deborah, RN Registered Nurse Nurse    BC 08/02/22 -  Collett, Brittany, RN Registered Nurse Nurse    AC 10/13/22 -  Jaya Castillo, RN Registered Nurse Nurse    BD 08/05/21 -  Radha Lynn, RN Registered Nurse Nurse    KM 05/24/22 -  Mao Vázquez, PT Physical Therapist PT    EB 09/22/22 -  Cedric Oliver, RN Registered Nurse Nurse    KK 06/27/22 -  Chelsi Bolivar, SONAM Registered Nurse Nurse    DL 03/16/22 -  Francy Garcia, RN Registered Nurse Nurse    KG 09/22/22 -  Verónica Rodriguez, RN Registered Nurse Nurse    RW 09/22/22 -  Bethany Schofield RN Registered Nurse Nurse    RH 08/03/22 - 12/21/22 Jung Russell, SONAM Extern Registered Nurse Nurse              PT Recommendation and Plan  Anticipated Discharge Disposition (PT): home with 24/7 care  Planned Therapy Interventions (PT): balance training, bed mobility training, gait training, home exercise program, manual therapy techniques, motor coordination training, neuromuscular re-education, patient/family education, postural re-education, strengthening, transfer training  Therapy Frequency (PT): 5 times/wk  Progress Summary (PT)  Progress Toward Functional Goals (PT): progress toward functional goals is good, prepare for discharge  Plan of Care Reviewed With: patient, son  Progress: improving   Outcome Measures     Row Name 01/12/23 1521 01/11/23 1500          How much help from another is currently needed...    Putting on and taking off regular lower body clothing? 3  -LM 3  -LM     Bathing (including washing, rinsing, and drying) 3  -LM 3  -LM     Toileting (which includes using toilet bed pan or urinal) 3  -LM 3  -LM     Putting on and taking off regular upper body clothing 3  -LM 3  -LM     Taking care of personal grooming (such as brushing teeth) 3  -LM 3  -LM     Eating meals 4  -LM 4  -LM     AM-PAC 6 Clicks Score (OT) 19  -LM 19  -LM        Functional Assessment    Outcome Measure Options AM-PAC 6 Clicks Daily Activity (OT)   -LM AM-PAC 6 Clicks Daily Activity (OT)  -LM           User Key  (r) = Recorded By, (t) = Taken By, (c) = Cosigned By    Initials Name Provider Type    LM Татьяна Bah OT Occupational Therapist                 Time Calculation:    PT Charges     Row Name 01/12/23 1536             Time Calculation    Start Time 1040  -CT      Stop Time 1110  -CT      Time Calculation (min) 30 min  -CT      PT Received On 01/12/23  -CT            User Key  (r) = Recorded By, (t) = Taken By, (c) = Cosigned By    Initials Name Provider Type    CT Bhargavi Vásquez, PT Physical Therapist              Therapy Charges for Today     Code Description Service Date Service Provider Modifiers Qty    32132822695 HC GAIT TRAINING EA 15 MIN 1/11/2023 Bhargavi Vásquez, PT GP 1    90277077840 HC PT THERAPEUTIC ACT EA 15 MIN 1/11/2023 Bhargavi Vásquez, PT GP 1    31040817144 HC PT THER PROC EA 15 MIN 1/11/2023 Bhargavi Vásquez, PT GP 1    51529061251 HC GAIT TRAINING EA 15 MIN 1/12/2023 Bhargavi Vásquez, PT GP 1    32995937517 HC PT THER PROC EA 15 MIN 1/12/2023 Bhargavi Vásquez, PT GP 1          PT G-Codes  Outcome Measure Options: AM-PAC 6 Clicks Daily Activity (OT)  AM-PAC 6 Clicks Score (PT): 16  AM-PAC 6 Clicks Score (OT): 19    Bhargavi Vásquez PT  1/12/2023

## 2023-01-13 ENCOUNTER — APPOINTMENT (OUTPATIENT)
Dept: CT IMAGING | Facility: HOSPITAL | Age: 88
End: 2023-01-13
Payer: MEDICARE

## 2023-01-13 ENCOUNTER — APPOINTMENT (OUTPATIENT)
Dept: GENERAL RADIOLOGY | Facility: HOSPITAL | Age: 88
End: 2023-01-13
Payer: MEDICARE

## 2023-01-13 ENCOUNTER — READMISSION MANAGEMENT (OUTPATIENT)
Dept: CALL CENTER | Facility: HOSPITAL | Age: 88
End: 2023-01-13
Payer: MEDICARE

## 2023-01-13 ENCOUNTER — HOSPITAL ENCOUNTER (EMERGENCY)
Facility: HOSPITAL | Age: 88
Discharge: HOME | End: 2023-01-14
Attending: FAMILY MEDICINE | Admitting: FAMILY MEDICINE
Payer: MEDICARE

## 2023-01-13 VITALS
OXYGEN SATURATION: 97 % | BODY MASS INDEX: 26.02 KG/M2 | HEIGHT: 67 IN | WEIGHT: 165.8 LBS | DIASTOLIC BLOOD PRESSURE: 85 MMHG | TEMPERATURE: 97.8 F | RESPIRATION RATE: 18 BRPM | HEART RATE: 80 BPM | SYSTOLIC BLOOD PRESSURE: 165 MMHG

## 2023-01-13 DIAGNOSIS — N18.9 CHRONIC KIDNEY DISEASE, UNSPECIFIED CKD STAGE: ICD-10-CM

## 2023-01-13 DIAGNOSIS — S40.022A CONTUSION OF MULTIPLE SITES OF LEFT SHOULDER AND UPPER ARM, INITIAL ENCOUNTER: ICD-10-CM

## 2023-01-13 DIAGNOSIS — S40.012A CONTUSION OF MULTIPLE SITES OF LEFT SHOULDER AND UPPER ARM, INITIAL ENCOUNTER: ICD-10-CM

## 2023-01-13 DIAGNOSIS — S09.90XA INJURY OF HEAD, INITIAL ENCOUNTER: ICD-10-CM
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
## 2023-01-13 DIAGNOSIS — S30.0XXA CONTUSION OF PELVIC REGION, INITIAL ENCOUNTER: ICD-10-CM

## 2023-01-13 LAB
ALBUMIN SERPL-MCNC: 3 G/DL (ref 3.5–5.2)
ALBUMIN/GLOB SERPL: 0.9 G/DL
ALP SERPL-CCNC: 52 U/L (ref 39–117)
ALT SERPL W P-5'-P-CCNC: <5 U/L (ref 1–33)
ANION GAP SERPL CALCULATED.3IONS-SCNC: 11.4 MMOL/L (ref 5–15)
AST SERPL-CCNC: 11 U/L (ref 1–32)
BASOPHILS # BLD AUTO: 0.06 10*3/MM3 (ref 0–0.2)
BASOPHILS NFR BLD AUTO: 1 % (ref 0–1.5)
BILIRUB SERPL-MCNC: 0.3 MG/DL (ref 0–1.2)
BUN SERPL-MCNC: 33 MG/DL (ref 8–23)
BUN/CREAT SERPL: 15 (ref 7–25)
CALCIUM SPEC-SCNC: 8.8 MG/DL (ref 8.2–9.6)
CHLORIDE SERPL-SCNC: 101 MMOL/L (ref 98–107)
CO2 SERPL-SCNC: 22.6 MMOL/L (ref 22–29)
CREAT SERPL-MCNC: 2.2 MG/DL (ref 0.57–1)
DEPRECATED RDW RBC AUTO: 49.8 FL (ref 37–54)
EGFRCR SERPLBLD CKD-EPI 2021: 20.8 ML/MIN/1.73
EOSINOPHIL # BLD AUTO: 0.13 10*3/MM3 (ref 0–0.4)
EOSINOPHIL NFR BLD AUTO: 2.1 % (ref 0.3–6.2)
ERYTHROCYTE [DISTWIDTH] IN BLOOD BY AUTOMATED COUNT: 14.3 % (ref 12.3–15.4)
GLOBULIN UR ELPH-MCNC: 3.2 GM/DL
GLUCOSE SERPL-MCNC: 104 MG/DL (ref 65–99)
HCT VFR BLD AUTO: 37.1 % (ref 34–46.6)
HGB BLD-MCNC: 11.3 G/DL (ref 12–15.9)
HOLD SPECIMEN: NORMAL
IMM GRANULOCYTES # BLD AUTO: 0.02 10*3/MM3 (ref 0–0.05)
IMM GRANULOCYTES NFR BLD AUTO: 0.3 % (ref 0–0.5)
INR PPP: 1.29 (ref 0.9–1.1)
LYMPHOCYTES # BLD AUTO: 1.13 10*3/MM3 (ref 0.7–3.1)
LYMPHOCYTES NFR BLD AUTO: 18.1 % (ref 19.6–45.3)
MCH RBC QN AUTO: 28.8 PG (ref 26.6–33)
MCHC RBC AUTO-ENTMCNC: 30.5 G/DL (ref 31.5–35.7)
MCV RBC AUTO: 94.6 FL (ref 79–97)
MONOCYTES # BLD AUTO: 0.42 10*3/MM3 (ref 0.1–0.9)
MONOCYTES NFR BLD AUTO: 6.7 % (ref 5–12)
NEUTROPHILS NFR BLD AUTO: 4.49 10*3/MM3 (ref 1.7–7)
NEUTROPHILS NFR BLD AUTO: 71.8 % (ref 42.7–76)
NRBC BLD AUTO-RTO: 0 /100 WBC (ref 0–0.2)
NT-PROBNP SERPL-MCNC: 7941 PG/ML (ref 0–1800)
PLATELET # BLD AUTO: 207 10*3/MM3 (ref 140–450)
PMV BLD AUTO: 9.8 FL (ref 6–12)
POTASSIUM SERPL-SCNC: 4.2 MMOL/L (ref 3.5–5.2)
PROT SERPL-MCNC: 6.2 G/DL (ref 6–8.5)
PROTHROMBIN TIME: 16.4 SECONDS (ref 12.1–14.7)
RBC # BLD AUTO: 3.92 10*6/MM3 (ref 3.77–5.28)
SODIUM SERPL-SCNC: 135 MMOL/L (ref 136–145)
TROPONIN T SERPL-MCNC: <0.01 NG/ML (ref 0–0.03)
WBC NRBC COR # BLD: 6.25 10*3/MM3 (ref 3.4–10.8)
WHOLE BLOOD HOLD COAG: NORMAL
WHOLE BLOOD HOLD SPECIMEN: NORMAL

## 2023-01-13 PROCEDURE — 85610 PROTHROMBIN TIME: CPT | Performed by: FAMILY MEDICINE

## 2023-01-13 PROCEDURE — 73030 X-RAY EXAM OF SHOULDER: CPT

## 2023-01-13 PROCEDURE — 97535 SELF CARE MNGMENT TRAINING: CPT

## 2023-01-13 PROCEDURE — 99283 EMERGENCY DEPT VISIT LOW MDM: CPT

## 2023-01-13 PROCEDURE — 93005 ELECTROCARDIOGRAM TRACING: CPT | Performed by: FAMILY MEDICINE

## 2023-01-13 PROCEDURE — 80053 COMPREHEN METABOLIC PANEL: CPT | Performed by: FAMILY MEDICINE

## 2023-01-13 PROCEDURE — 99316 NF DSCHRG MGMT 30 MIN+: CPT | Performed by: INTERNAL MEDICINE

## 2023-01-13 PROCEDURE — 72125 CT NECK SPINE W/O DYE: CPT

## 2023-01-13 PROCEDURE — 83880 ASSAY OF NATRIURETIC PEPTIDE: CPT | Performed by: FAMILY MEDICINE

## 2023-01-13 PROCEDURE — 97530 THERAPEUTIC ACTIVITIES: CPT

## 2023-01-13 PROCEDURE — 70450 CT HEAD/BRAIN W/O DYE: CPT

## 2023-01-13 PROCEDURE — 93010 ELECTROCARDIOGRAM REPORT: CPT | Performed by: SPECIALIST

## 2023-01-13 PROCEDURE — 85025 COMPLETE CBC W/AUTO DIFF WBC: CPT | Performed by: FAMILY MEDICINE

## 2023-01-13 PROCEDURE — 36415 COLL VENOUS BLD VENIPUNCTURE: CPT

## 2023-01-13 PROCEDURE — 72170 X-RAY EXAM OF PELVIS: CPT

## 2023-01-13 PROCEDURE — 84484 ASSAY OF TROPONIN QUANT: CPT | Performed by: FAMILY MEDICINE

## 2023-01-13 PROCEDURE — 71045 X-RAY EXAM CHEST 1 VIEW: CPT

## 2023-01-13 RX ORDER — DOCUSATE SODIUM 100 MG/1
100 CAPSULE, LIQUID FILLED ORAL 2 TIMES DAILY
Qty: 60 CAPSULE | Refills: 0 | Status: SHIPPED | OUTPATIENT
Start: 2023-01-13 | End: 2023-02-12

## 2023-01-13 RX ORDER — FUROSEMIDE 40 MG/1
40 TABLET ORAL 2 TIMES DAILY
Qty: 60 TABLET | Refills: 0 | Status: SHIPPED | OUTPATIENT
Start: 2023-01-13 | End: 2023-01-13 | Stop reason: SDUPTHER

## 2023-01-13 RX ORDER — DOCUSATE SODIUM 100 MG/1
100 CAPSULE, LIQUID FILLED ORAL 2 TIMES DAILY
Qty: 60 CAPSULE | Refills: 0 | Status: SHIPPED | OUTPATIENT
Start: 2023-01-13 | End: 2023-01-13 | Stop reason: SDUPTHER

## 2023-01-13 RX ORDER — FUROSEMIDE 40 MG/1
40 TABLET ORAL 2 TIMES DAILY
Qty: 60 TABLET | Refills: 0 | Status: SHIPPED | OUTPATIENT
Start: 2023-01-13 | End: 2023-02-12

## 2023-01-13 RX ORDER — SODIUM CHLORIDE 0.9 % (FLUSH) 0.9 %
10 SYRINGE (ML) INJECTION AS NEEDED
Status: DISCONTINUED | OUTPATIENT
Start: 2023-01-13 | End: 2023-01-14 | Stop reason: HOSPADM

## 2023-01-13 RX ADMIN — PANTOPRAZOLE SODIUM 40 MG: 40 TABLET, DELAYED RELEASE ORAL at 06:39

## 2023-01-13 RX ADMIN — METOPROLOL TARTRATE 25 MG: 25 TABLET, FILM COATED ORAL at 08:44

## 2023-01-13 RX ADMIN — DOCUSATE SODIUM 100 MG: 100 CAPSULE ORAL at 08:44

## 2023-01-13 RX ADMIN — ISOSORBIDE MONONITRATE 30 MG: 30 TABLET, EXTENDED RELEASE ORAL at 08:44

## 2023-01-13 RX ADMIN — MAGNESIUM GLUCONATE 500 MG ORAL TABLET 400 MG: 500 TABLET ORAL at 08:45

## 2023-01-13 RX ADMIN — TAMSULOSIN HYDROCHLORIDE 0.4 MG: 0.4 CAPSULE ORAL at 08:45

## 2023-01-13 RX ADMIN — DICLOFENAC 4 G: 10 GEL TOPICAL at 08:45

## 2023-01-13 RX ADMIN — ESCITALOPRAM 10 MG: 10 TABLET, FILM COATED ORAL at 08:45

## 2023-01-13 RX ADMIN — FUROSEMIDE 40 MG: 40 TABLET ORAL at 08:44

## 2023-01-13 RX ADMIN — Medication 1 TABLET: at 08:44

## 2023-01-13 RX ADMIN — APIXABAN 5 MG: 5 TABLET, FILM COATED ORAL at 08:45

## 2023-01-13 NOTE — CASE MANAGEMENT/SOCIAL WORK
Discharge Planning Assessment   Scot     Patient Name: Tre Kinney  MRN: [id number]  Today's Date: 1/13/2023    Admit Date: 10/5/2022    Plan: Pt was admitted to swing bed on 10/5/22. SS was notified by Swing Bed RN, Pt's insurance has issued denial of coverage with the last covered day of 1/12/23. SS to follow and assist as needed.     Discharge Plan     Row Name 01/13/23 1031       Plan    Final Discharge Disposition Code 06 - home with home health care    Final Note Pt is being discharged home with physician order for home health. SS spoke with pt at bedside on this date who states she has no preference for home health agency. Pt states having some concerns about transportation.  SS faxed referral to [facility]. SS contacted [facility] per Ileana about referral. SS contacted pt's son, Valdez [phone] who states he will be home at 1pm today and is agreeable to help pt in the house. SS to contact  [facility] [phone] to arrange transportation. No other needs identified at this time.                Kelly Jones, TERESITAW

## 2023-01-13 NOTE — ED PROVIDER NOTES
Subjective   History of Present Illness  89-year-old female with history of CHF chronic kidney disease recently discharged from the hospital today presents to the emergency room with complaints of fall.  Patient reports that she was admitted to the hospital for debility weakness and was discharged home.  She was told that she was instructed not to ambulate without assistance.  She states that her son was asleep and she needed to go to the bathroom and did not want a waking up.  She states she is attempted to get up and walk and subsequently fell.  She denies chest pain shortness of breath she denies feeling lightheaded.  She reports that she did hit her left shoulder and hit her head.  She denies loss of consciousness.  She denies fever chills cough congestion.    Fall  Mechanism of injury: fall    Injury location:  Head/neck  Head/neck injury location:  Head  Incident location:  Home  Fall:     Fall occurred:  Walking  Tetanus status:  Up to date  Prior to arrival data:     Bystander interventions:  None    Blood loss:  None    Responsiveness at scene:  Alert    Orientation at scene:  Person, place, situation and time    Airway interventions:  None  Associated symptoms: headaches    Associated symptoms: no abdominal pain, no back pain, no chest pain, no nausea, no neck pain, no seizures and no vomiting    Risk factors: CHF        Review of Systems   Constitutional: Negative for fatigue and fever.   Cardiovascular: Negative for chest pain.   Gastrointestinal: Negative for abdominal pain, nausea and vomiting.   Musculoskeletal: Negative for back pain and neck pain.   Neurological: Positive for headaches. Negative for seizures.   All other systems reviewed and are negative.      Past Medical History:   Diagnosis Date   • Anemia    • Arthritis    • CHF (congestive heart failure) (HCC)    • Chronic kidney disease    • Coronary artery disease    • H/O blood clots    • History of transfusion     no reaction    •  Hypertension        Allergies   Allergen Reactions   • Adhesive Tape Dermatitis   • Xanax [Alprazolam] Unknown - Low Severity       Past Surgical History:   Procedure Laterality Date   • ABDOMINAL SURGERY     • APPENDECTOMY     • CARDIAC SURGERY     •  SECTION     • COLONOSCOPY N/A 3/7/2018    Procedure: COLONOSCOPY;  Surgeon: Willie Gresham MD;  Location:  [facility];  Service:    • CORONARY ANGIOPLASTY WITH STENT PLACEMENT     • ENDOSCOPY N/A 3/6/2018    Procedure: ESOPHAGOGASTRODUODENOSCOPY;  Surgeon: Willie Gresham MD;  Location:  [facility];  Service:    • PACEMAKER IMPLANTATION      left side    • SKIN BIOPSY     • TUBAL ABDOMINAL LIGATION         Family History   Problem Relation Age of Onset   • Other Mother         cardiac disorder   • Hypertension Mother    • Heart disease Mother    • Thyroid disease Mother    • Other Father         cardiac disorder   • Heart disease Father    • Hypertension Father    • Other Sister         cardiac disorder   • Other Brother         cardiac disorder   • Other Other         cardiac disorder   • Other Other         cardiac disorder       Social History     Socioeconomic History   • Marital status:    Tobacco Use   • Smoking status: Never   Substance and Sexual Activity   • Alcohol use: No   • Drug use: No   • Sexual activity: Defer           Objective   Physical Exam  Vitals and nursing note reviewed.   Constitutional:       General: She is not in acute distress.     Appearance: She is obese. She is not ill-appearing.   HENT:      Head: Normocephalic and atraumatic.      Comments: No skull or scalp deformity.  No midface instability.  No hemotympanum.  No wall sign no raccoon eyes.     Mouth/Throat:      Mouth: Mucous membranes are moist.   Eyes:      Extraocular Movements: Extraocular movements intact.      Pupils: Pupils are equal, round, and reactive to light.   Cardiovascular:      Rate and Rhythm: Normal rate and regular rhythm.       Comments: 2+ radial pulse 2+ dorsalis pedis pulses  Pulmonary:      Effort: Pulmonary effort is normal.      Breath sounds: Normal breath sounds.   Abdominal:      General: Bowel sounds are normal.      Palpations: Abdomen is soft.      Tenderness: There is no abdominal tenderness. There is no guarding.   Musculoskeletal:      Cervical back: Neck supple.      Comments: Left lateral hip tender to palpation.  No limb shortening or external rotation.  Left posterior shoulder tenderness palpation.  No vertebral tenderness.   Skin:     Capillary Refill: Capillary refill takes less than 2 seconds.   Neurological:      General: No focal deficit present.      Mental Status: She is alert and oriented to person, place, and time.      Cranial Nerves: No cranial nerve deficit.      Comments: GCS 15   Psychiatric:         Mood and Affect: Mood normal.         Procedures           ED Course  ED Course as of 01/13/23 2206 Fri Jan 13, 2023 1906 EKG shows ventricular paced rhythm no ST elevation. [BB]   1936 Patient elevated BUN/creatinine 33 and 2.2.  Patient has had similar elevation recently.  Is close to her baseline BNP is elevated is likely due to her chronic kidney disease.  Troponin negative x1 set. [BB]   1947 CBC is unremarkable [BB]   2032 CT scan of head and CT scan of neck are unremarkable for acute abnormality. [BB]   2204 XR Shoulder 2+ View Left    Result Date: 1/13/2023  Osteopenia and degenerative changes. No acute fracture. Somewhat limited scapular Y view due to positioning. Signer Name: Chester Minaya MD  Signed: 1/13/2023 8:42 PM  Workstation Name: [email or web address]  [facility]    CT Head Without Contrast    Result Date: 1/13/2023  Senescent changes without acute abnormality. No change from 9/8/2022. Signer Name: HILDA Ramirez MD  Signed: 1/13/2023 8:24 PM  Workstation Name: [id number]  [facility]    CT Cervical Spine Without Contrast    Result Date:  1/13/2023  No acute traumatic findings. No change from September 2022 Signer Name: HILDA Ramirez MD  Signed: 1/13/2023 8:27 PM  Workstation Name: [id number]  [facility]    XR Chest 1 View    Result Date: 1/13/2023  1. Cardiomegaly without distinct volume overload. 2. Hiatal hernia and chronic interstitial changes. Signer Name: Chester Minaya MD  Signed: 1/13/2023 8:43 PM  Workstation Name: [id number]  [facility]    XR Pelvis 1 or 2 View    Result Date: 1/13/2023  1. Osteopenia and degenerative change. No acute fracture. Signer Name: Chester Minaya MD  Signed: 1/13/2023 8:38 PM  Workstation Name: [email or web address]  [facility]     [BB]      ED Course User Index  [BB] Pradeep Donis MD                                           Medical Decision Making  Chronic kidney disease, unspecified CKD stage: acute illness or injury  Contusion of multiple sites of left shoulder and upper arm, initial encounter: acute illness or injury  Contusion of pelvic region, initial encounter: acute illness or injury  Fall, initial encounter: acute illness or injury  Injury of head, initial encounter: acute illness or injury  Amount and/or Complexity of Data Reviewed  Labs: ordered.  Radiology: ordered.  ECG/medicine tests: ordered.      Risk  Prescription drug management.          Final diagnoses:   Fall, initial encounter   Chronic kidney disease, unspecified CKD stage   Contusion of pelvic region, initial encounter   Contusion of multiple sites of left shoulder and upper arm, initial encounter   Injury of head, initial encounter       ED Disposition  ED Disposition     ED Disposition   Discharge    Condition   Stable    Comment   --             Jessica Pedro, LANCE  [address] [id number]  [phone]    In 2 days           Medication List      No changes were made to your prescriptions during this visit.          Pradeep Donis MD  01/13/23 0694

## 2023-01-13 NOTE — DISCHARGE INSTRUCTIONS
Please take medications as prescribed. Please go to follow-up appointments as recommended. Please seek medical attention if you have worsening of any symptoms.     Please continue social distancing and isolation efforts as recommended by CDC and [facility] to help prevent spread of COVID-19.

## 2023-01-13 NOTE — DISCHARGE SUMMARY
[facility] HOSPITALISTS DISCHARGE SUMMARY    Patient Identification:  Name:  Tre Kinney  Age:  89 y.o.  Sex:  female  :  1932  MRN:  [id number]  Visit Number:  [id number]    Date of Admission: 10/5/2022  Date of Discharge:  2023    PCP: Jessica Pedro APRN    DISCHARGE DIAGNOSIS  Generalized weakness/debility  Diffuse LLE DVT  DIANA on CKD IV  Acute blood loss anemia  Traumatic right gluteal hematoma  Mild hypokalemia   Episode of CP  Paroxysmal Afib  Hx of SSS s/p pacemaker placement  Essential HTN  Reported hx of DVT's   Anxiety/depression  Advanced age    CONSULTS   Nephrology   Palliative care   General surgery   Psychiatry   Podiatry     PROCEDURES PERFORMED  None    HOSPITAL COURSE    Ms. Kinney is our  89 y.o. female presented to [facility] complaining of a mechanical fall with inability to get up.  In our emergency department, the patient was noted to have a hemoglobin level of 6.3.  Thus, she was admitted to the progressive care unit for further evaluation and treatment.  Please see the admitting history and physical for further details.  On initial exam, a large right hip hematoma was seen and was suspected to be the cause of the acute blood loss.  She was given Kcentra in the emergency department as well as vitamin K.  She ended up needing 3 units during the first 3 days of her hospitalization.  Afterwards, her hemoglobin was stable and she did not need any further treatment.  The hematoma seem to be resolving daily during her hospitalization.  Patient also with DIANA in setting of CKD IV that resolved. Patient subsequently had to be diuresed for some mild CHF after gentle hydration for renal injury. Patient also incidentally had vertebral fracture incidentally found. PT/OT consulted and was following. Patient admitted to rehab for further therapy. Patient's hypertensive regimen titrated down as they were not needed. Patient found to have LLE DVT and has been  started on anticoagulation and tolerating well. Treated for UTI while in swing bed. Patient had a few episodes of chest pain. No new ischemic findings on EKG, Troponin negative. Home imdur restarted and this has improved, no chest pain this week. Swing bed stay prolonged in attempt to continue to rehabilitated patient until family comfortable taking patient home. Patient has improved and has been working with therapy. Patient stable for discharge today, she has reached her maximum of 100 days of swing bed therapy. HHPT ordered. Patient noted she already had rolling walker and hospital bed. She noted she was comfortable returning home.         VITAL SIGNS:  Temp:  [97.7 °F (36.5 °C)-97.8 °F (36.6 °C)] 97.8 °F (36.6 °C)  Heart Rate:  [76-80] 80  Resp:  [18] 18  BP: (153-165)/(70-85) 165/85  SpO2:  [97 %] 97 %  on   ;   Device (Oxygen Therapy): room air    Body mass index is 25.96 kg/m².  Wt Readings from Last 3 Encounters:   01/13/23 75.2 kg (165 lb 12.8 oz)   10/05/22 76.6 kg (168 lb 14.4 oz)   09/08/22 76.7 kg (169 lb)       PHYSICAL EXAM:  Constitutional:  Well-developed and well-nourished.  No respiratory distress.      HENT:  Head:  Normocephalic and atraumatic.  Mouth:  Moist mucous membranes.    Eyes:  Conjunctivae and EOM are normal.  Pupils are equal, round, and reactive to light.  No scleral icterus.    Cardiovascular:  Normal rate, regular rhythm and normal heart sounds with no murmur.  Pulmonary/Chest:  No respiratory distress, no wheezes, no crackles, with normal breath sounds and good air movement.  Abdominal:  Soft.  Bowel sounds are normal.  No distension and no tenderness.   Musculoskeletal:  No edema, no tenderness, and no deformity.  No red or swollen joints anywhere.    Neurological:  Alert and oriented to person, place, and time.  No gross neurological deficit.   Skin:  Skin is warm and dry. No rash noted. No pallor.   Peripheral vascular:  Strong pulses in all 4 extremities with no clubbing, no  cyanosis, no edema.    DISCHARGE DISPOSITION   Stable    DISCHARGE MEDICATIONS:     Discharge Medications      New Medications      Instructions Start Date   docusate sodium 100 MG capsule  Commonly known as: COLACE   100 mg, Oral, 2 Times Daily      Eliquis 5 MG tablet tablet  Generic drug: apixaban   5 mg, Oral, Every 12 Hours Scheduled         Changes to Medications      Instructions Start Date   furosemide 40 MG tablet  Commonly known as: LASIX  What changed: when to take this   40 mg, Oral, 2 Times Daily      metoprolol tartrate 25 MG tablet  Commonly known as: LOPRESSOR  What changed:   · medication strength  · how much to take  · when to take this   25 mg, Oral, Every 12 Hours Scheduled         Continue These Medications      Instructions Start Date   escitalopram 10 MG tablet  Commonly known as: LEXAPRO   10 mg, Oral, Daily      gabapentin 400 MG capsule  Commonly known as: NEURONTIN   400 mg, Oral, Nightly      HYDROcodone-acetaminophen 7.5-325 MG per tablet  Commonly known as: NORCO   1 tablet, Oral, Every 8 Hours PRN      isosorbide mononitrate 60 MG 24 hr tablet  Commonly known as: IMDUR   60 mg, Oral, Daily      pantoprazole 40 MG EC tablet  Commonly known as: PROTONIX   40 mg, Oral, Daily         Stop These Medications    ALPRAZolam 1 MG tablet  Commonly known as: XANAX     aspirin 81 MG EC tablet     hydrALAZINE 100 MG tablet  Commonly known as: APRESOLINE     potassium chloride 10 MEQ CR capsule  Commonly known as: MICRO-K     warfarin 6 MG tablet  Commonly known as: COUMADIN              Additional Instructions for the Follow-ups that You Need to Schedule     Ambulatory Referral to Home Health   As directed      Face to Face Visit Date: 1/13/2023    Follow-up provider for Plan of Care?: I treated the patient in an acute care facility and will not continue treatment after discharge.    Follow-up provider: LUCIO SULLIVAN [[id number]]    Reason/Clinical Findings: Debility, advanced age    Describe mobility  limitations that make leaving home difficult: Debility, advanced age    Nursing/Therapeutic Services Requested: Skilled Nursing Physical Therapy    Skilled nursing orders: Medication education    Frequency: 1 Week 1            Follow-up Information     Jessica Pedro APRN Follow up in 1 week(s).    Specialties: Nurse Practitioner, Family Medicine  Contact information:  [facility] [id number]  [phone]                          TEST  RESULTS PENDING AT DISCHARGE       CODE STATUS  Code Status and Medical Interventions:   Ordered at: 10/05/22 1253     Medical Intervention Limits:    NO intubation (DNI)     Level Of Support Discussed With:    Patient    Next of Kin (If No Surrogate)     Code Status (Patient has no pulse and is not breathing):    No CPR (Do Not Attempt to Resuscitate)     Medical Interventions (Patient has pulse or is breathing):    Limited Support     Comments:    Patient states that she does not want to be resuscitated or put on a ventilator, her son Leno supports her decision       The ASCVD Risk score (Maria Antonia HUITRON, et al., 2019) failed to calculate for the following reasons:    The 2019 ASCVD risk score is only valid for ages 40 to 79     Dante Levine MD  [facility]  01/13/23  13:28 EST    Please note that this discharge summary required more than 30 minutes to complete.

## 2023-01-13 NOTE — PLAN OF CARE
Goal Outcome Evaluation:      The patient has rested quietly in bed overnight thus far.She is very exited to return home with her family tomorrow. She did ambulate earlier in the shift and remained sitting up in her chair for 1.5 hours before bed. No acute events or distress overnight. Swingbed charting completed. Will continue with the plan of care.

## 2023-01-13 NOTE — THERAPY DISCHARGE NOTE
Acute Care - Physical Therapy Treatment Note/Discharge  CODY Ellison     Patient Name: Tre Kinney  : 1932  MRN: [id number]  Today's Date: 2023   Onset of Illness/Injury or Date of Surgery: 10/06/22     Referring Physician: Janneth      Admit Date: 10/5/2022    Visit Dx:    ICD-10-CM ICD-9-CM   1. Generalized weakness  R53.1 780.79     Patient Active Problem List   Diagnosis   • Acid reflux   • Ankle arthralgia   • Cardiac conduction disorder   • Closed fracture of distal fibula   • Arteriosclerosis of coronary artery   • Fracture of distal end of tibia   • Elevated cholesterol   • BP (high blood pressure)   • Hypertrophic polyarthritis   • OP (osteoporosis)   • Right heart failure (HCC)   • Heart failure, chronic, right-sided (HCC)   • SOB (shortness of breath)   • Palpitations   • CHF (congestive heart failure) (HCC)   • Congestive heart failure (HCC)   • Precordial pain   • Chronic anticoagulation   • Hypokalemia   • Anemia, unspecified type   • Generalized weakness     Past Medical History:   Diagnosis Date   • Anemia    • Arthritis    • CHF (congestive heart failure) (HCC)    • Chronic kidney disease    • Coronary artery disease    • H/O blood clots    • History of transfusion     no reaction    • Hypertension      Past Surgical History:   Procedure Laterality Date   • ABDOMINAL SURGERY     • APPENDECTOMY     • CARDIAC SURGERY     •  SECTION     • COLONOSCOPY N/A 3/7/2018    Procedure: COLONOSCOPY;  Surgeon: Willie Gresham MD;  Location:  [facility];  Service:    • CORONARY ANGIOPLASTY WITH STENT PLACEMENT     • ENDOSCOPY N/A 3/6/2018    Procedure: ESOPHAGOGASTRODUODENOSCOPY;  Surgeon: Willie Gresham MD;  Location:  [facility];  Service:    • PACEMAKER IMPLANTATION      left side    • SKIN BIOPSY     • TUBAL ABDOMINAL LIGATION         PT Assessment (last 12 hours)     PT Evaluation and Treatment     Row Name 23 1030          Physical Therapy Time and Intention    Subjective  Information no complaints  -CT     Document Type discharge evaluation/summary;therapy note (daily note)  -CT     Mode of Treatment physical therapy;individual therapy  -CT     Patient Effort good  -CT     Comment Pt d/c this date. Pt educated on HEP and home safety.  -CT     Row Name 01/13/23 1030          General Information    Patient Profile Reviewed yes  -CT     Row Name 01/13/23 1030          Cognition    Affect/Mental Status (Cognition) WFL  -CT     Orientation Status (Cognition) oriented x 4  -CT     Follows Commands (Cognition) WFL  -CT     Row Name 01/13/23 1030          Bed Mobility    Bed Mobility bed mobility (all) activities  -CT     All Activities, [place] (Bed Mobility) standby assist  -CT     Row Name 01/13/23 1030          Transfers    Transfers sit-stand transfer;stand-sit transfer  -CT     Row Name 01/13/23 1030          Sit-Stand Transfer    Sit-Stand [place] (Transfers) contact guard  -CT     Assistive Device (Sit-Stand Transfers) walker, front-wheeled  -CT     Row Name 01/13/23 1030          Stand-Sit Transfer    Stand-Sit [place] (Transfers) contact guard  -CT     Assistive Device (Stand-Sit Transfers) walker, front-wheeled  -CT     Row Name 01/13/23 1030          Gait/Stairs (Locomotion)    [place] Level (Gait) contact guard  -CT     Assistive Device (Gait) walker, front-wheeled  -CT     Distance in Feet (Gait) 50  -CT     Pattern (Gait) step-to  -CT     Deviations/Abnormal Patterns (Gait) stride length decreased;gait speed decreased;festinating/shuffling;weight shifting decreased  -CT     Bilateral Gait Deviations weight shift ability decreased;forward flexed posture  -CT     Row Name 01/13/23 1030          Motor Skills    Therapeutic Exercise --  pt provided HEP  -CT     Row Name 01/13/23 1030          Coping    Observed Emotional State calm;cooperative;pleasant  -CT     Verbalized Emotional State acceptance  -CT     Row Name 01/13/23 1030          Plan of Care Review     Plan of Care Reviewed With patient  -CT     Row Name 01/13/23 1030          Positioning and Restraints    Pre-Treatment Position in bed  -CT     Row Name 01/13/23 1030          Bed Mobility Goal 1 (PT)    Activity/Assistive Device (Bed Mobility Goal 1, PT) bed mobility activities, all  -CT     [place]wen Level/Cues Needed (Bed Mobility Goal 1, PT) minimum assist (75% or more patient effort)  -CT     Time Frame (Bed Mobility Goal 1, PT) by discharge  -CT     Progress/Outcomes (Bed Mobility Goal 1, PT) goal met  -CT     Row Name 01/13/23 1030          Transfer Goal 1 (PT)    Activity/Assistive Device (Transfer Goal 1, PT) sit-to-stand/stand-to-sit;bed-to-chair/chair-to-bed  -CT     Ewen Level/Cues Needed (Transfer Goal 1, PT) minimum assist (75% or more patient effort)  -CT     Time Frame (Transfer Goal 1, PT) by discharge  -CT     Progress/Outcome (Transfer Goal 1, PT) goal met  -CT     Row Name 01/13/23 1030          Gait Training Goal 1 (PT)    Activity/Assistive Device (Gait Training Goal 1, PT) gait (walking locomotion);assistive device use  -CT     Ewen Level (Gait Training Goal 1, PT) minimum assist (75% or more patient effort)  -CT     Distance (Gait Training Goal 1, PT) 30  -CT     Time Frame (Gait Training Goal 1, PT) by discharge  -CT     Progress/Outcome (Gait Training Goal 1, PT) goal met  -CT           User Key  (r) = Recorded By, (t) = Taken By, (c) = Cosigned By    Initials Name Provider Type    CT Bhargavi Vásquez PT Physical Therapist                  Physical Therapy Education     Title: PT OT SLP Therapies (Done)     Topic: Physical Therapy (Done)     Point: Mobility training (Done)     Learning Progress Summary           Patient Acceptance, E,TB, VU by KK at 1/6/2023 1225    Acceptance, E, VU by CL at 1/5/2023 1414    Acceptance, E,TB, DU,VU by BD at 1/3/2023 0838    Acceptance, TB,E, VU,DU by BD at 1/2/2023 0907    Acceptance, TB,E, VU,DU by BD at 1/1/2023 0846    Acceptance,  E,TB, VU by RM at 12/30/2022 1035    Acceptance, E,TB, VU by AC at 12/28/2022 1505    Acceptance, E,TB, VU by AC at 12/27/2022 1513    Acceptance, E, VU by CL at 12/26/2022 1325    Acceptance, E, VU by CL at 12/22/2022 1826    Acceptance, E, VU by CL at 12/21/2022 1303    Acceptance, E, VU by EB at 12/20/2022 1644    Acceptance, E, VU by CL at 12/17/2022 1703    Acceptance, E,D, VU,NR by LL at 12/16/2022 1605    Acceptance, E,TB, VU by AC at 12/16/2022 1523    Acceptance, E,TB, VU by AC at 12/15/2022 1618    Acceptance, E,TB, VU by AC at 12/14/2022 1503    Acceptance, E,TB, VU by DL at 12/13/2022 0240    Acceptance, D,E, VU,NR by LL at 12/12/2022 1455    Acceptance, E,TB, VU by RW at 12/9/2022 1451    Acceptance, E,TB, VU by BC at 11/27/2022 1319    Acceptance, E,TB, VU by RM at 11/20/2022 1449    Acceptance, E,TB, VU by RM at 11/18/2022 1021    Acceptance, E,TB, VU by RH at 11/16/2022 1110    Acceptance, E,TB, VU by KK at 11/14/2022 1638    Acceptance, E,TB, VU by KK at 11/13/2022 1648    Acceptance, E,TB, VU by KG at 11/10/2022 1827    Acceptance, E,TB, VU by DM at 11/9/2022 1638    Acceptance, E,TB, VU by KK at 11/8/2022 1754    Acceptance, E,TB, VU by KK at 11/7/2022 1635    Acceptance, E,D, VU,NR by LL at 11/5/2022 1325    Acceptance, E, VU by EB at 11/4/2022 1448    Acceptance, E,TB, VU by KM at 11/4/2022 1101   Family Acceptance, D,E, VU,NR by LL at 12/12/2022 1455                   Point: Home exercise program (Done)     Learning Progress Summary           Patient Acceptance, E,TB, VU by KK at 1/6/2023 1225    Acceptance, E, VU by CL at 1/5/2023 1414    Acceptance, E,TB, DU,VU by BD at 1/3/2023 0838    Acceptance, TB,E, VU,DU by BD at 1/2/2023 0907    Acceptance, TB,E, VU,DU by BD at 1/1/2023 0846    Acceptance, E,TB, VU by RM at 12/30/2022 1035    Acceptance, E,TB, VU by AC at 12/28/2022 1505    Acceptance, E,TB, VU by AC at 12/27/2022 1513    Acceptance, E, VU by CL at 12/26/2022 1325    Acceptance, E, VU  by CL at 12/22/2022 1826    Acceptance, E, VU by CL at 12/21/2022 1303    Acceptance, E, VU by EB at 12/20/2022 1644    Acceptance, E, VU by CL at 12/17/2022 1703    Acceptance, E,D, VU,NR by LL at 12/16/2022 1605    Acceptance, E,TB, VU by AC at 12/16/2022 1523    Acceptance, E,TB, VU by AC at 12/15/2022 1618    Acceptance, E,TB, VU by AC at 12/14/2022 1503    Acceptance, E,TB, VU by DL at 12/13/2022 0240    Acceptance, D,E, VU,NR by LL at 12/12/2022 1455    Acceptance, E,TB, VU by RW at 12/9/2022 1451    Acceptance, E,TB, VU by BC at 11/27/2022 1319    Acceptance, E,TB, VU by RM at 11/20/2022 1449    Acceptance, E,TB, VU by RM at 11/18/2022 1021    Acceptance, E,TB, VU by RH at 11/16/2022 1110    Acceptance, E,TB, VU by KK at 11/14/2022 1638    Acceptance, E,TB, VU by KK at 11/13/2022 1648    Acceptance, E,TB, VU by KG at 11/10/2022 1827    Acceptance, E,TB, VU by DM at 11/9/2022 1638    Acceptance, E,TB, VU by KK at 11/8/2022 1754    Acceptance, E,TB, VU by KK at 11/7/2022 1635    Acceptance, E,D, VU,NR by LL at 11/5/2022 1325    Acceptance, E, VU by EB at 11/4/2022 1448    Acceptance, E,TB, VU by KM at 11/4/2022 1101   Family Acceptance, D,E, VU,NR by LL at 12/12/2022 1455                   Point: Body mechanics (Done)     Learning Progress Summary           Patient Acceptance, E,TB, VU by KK at 1/6/2023 1225    Acceptance, E, VU by CL at 1/5/2023 1414    Acceptance, E,TB, DU,VU by BD at 1/3/2023 0838    Acceptance, TB,E, VU,DU by BD at 1/2/2023 0907    Acceptance, TB,E, VU,DU by BD at 1/1/2023 0846    Acceptance, E,TB, VU by RM at 12/30/2022 1035    Acceptance, E,TB, VU by AC at 12/28/2022 1505    Acceptance, E,TB, VU by AC at 12/27/2022 1513    Acceptance, E, VU by CL at 12/26/2022 1325    Acceptance, E, VU by CL at 12/22/2022 1826    Acceptance, E, VU by CL at 12/21/2022 1303    Acceptance, E, VU by EB at 12/20/2022 1644    Acceptance, E, VU by CL at 12/17/2022 1703    Acceptance, E,D, VU,NR by LL at  12/16/2022 1605    Acceptance, E,TB, VU by AC at 12/16/2022 1523    Acceptance, E,TB, VU by AC at 12/15/2022 1618    Acceptance, E,TB, VU by AC at 12/14/2022 1503    Acceptance, E,TB, VU by DL at 12/13/2022 0240    Acceptance, D,E, VU,NR by LL at 12/12/2022 1455    Acceptance, E,TB, VU by RW at 12/9/2022 1451    Acceptance, E,TB, VU by BC at 11/27/2022 1319    Acceptance, E,TB, VU by RM at 11/20/2022 1449    Acceptance, E,TB, VU by RM at 11/18/2022 1021    Acceptance, E,TB, VU by RH at 11/16/2022 1110    Acceptance, E,TB, VU by KK at 11/14/2022 1638    Acceptance, E,TB, VU by KK at 11/13/2022 1648    Acceptance, E,TB, VU by KG at 11/10/2022 1827    Acceptance, E,TB, VU by DM at 11/9/2022 1638    Acceptance, E,TB, VU by KK at 11/8/2022 1754    Acceptance, E,TB, VU by KK at 11/7/2022 1635    Acceptance, E,D, VU,NR by LL at 11/5/2022 1325    Acceptance, E, VU by EB at 11/4/2022 1448    Acceptance, E,TB, VU by KM at 11/4/2022 1101   Family Acceptance, D,E, VU,NR by LL at 12/12/2022 1455                   Point: Precautions (Done)     Learning Progress Summary           Patient Acceptance, E,TB, VU by KK at 1/6/2023 1225    Acceptance, E, VU by CL at 1/5/2023 1414    Acceptance, E,TB, DU,VU by BD at 1/3/2023 0838    Acceptance, TB,E, VU,DU by BD at 1/2/2023 0907    Acceptance, TB,E, VU,DU by BD at 1/1/2023 0846    Acceptance, E,TB, VU by RM at 12/30/2022 1035    Acceptance, E,TB, VU by AC at 12/28/2022 1505    Acceptance, E,TB, VU by AC at 12/27/2022 1513    Acceptance, E, VU by CL at 12/26/2022 1325    Acceptance, E, VU by CL at 12/22/2022 1826    Acceptance, E, VU by CL at 12/21/2022 1303    Acceptance, E, VU by EB at 12/20/2022 1644    Acceptance, E, VU by CL at 12/17/2022 1703    Acceptance, E,D, VU,NR by LL at 12/16/2022 1605    Acceptance, E,TB, VU by AC at 12/16/2022 1523    Acceptance, E,TB, VU by AC at 12/15/2022 1618    Acceptance, E,TB, VU by AC at 12/14/2022 1503    Acceptance, E,TB, VU by DL at 12/13/2022  0240    Acceptance, D,E, VU,NR by LL at 12/12/2022 1455    Acceptance, E,TB, VU by RW at 12/9/2022 1451    Acceptance, E,TB, VU by BC at 11/27/2022 1319    Acceptance, E,TB, VU by RM at 11/20/2022 1449    Acceptance, E,TB, VU by RM at 11/18/2022 1021    Acceptance, E,TB, VU by RH at 11/16/2022 1110    Acceptance, E,TB, VU by KK at 11/14/2022 1638    Acceptance, E,TB, VU by KK at 11/13/2022 1648    Acceptance, E,TB, VU by KG at 11/10/2022 1827    Acceptance, E,TB, VU by DM at 11/9/2022 1638    Acceptance, E,TB, VU by KK at 11/8/2022 1754    Acceptance, E,TB, VU by KK at 11/7/2022 1635    Acceptance, E,D, VU,NR by LL at 11/5/2022 1325    Acceptance, E, VU by EB at 11/4/2022 1448    Acceptance, E,TB, VU by KM at 11/4/2022 1101   Family Acceptance, D,E, VU,NR by LL at 12/12/2022 1455                               User Key     Initials Effective Dates Name Provider Type Discipline    CL 06/16/21 -  Mitzy Mustafa, RN Registered Nurse Nurse    LL 05/02/16 -  Neda Garcia, PTA Physical Therapist Assistant PT    RM 06/14/22 -  Bethany Krause, RN Registered Nurse Nurse    DM 06/16/21 -  Moniz, Deborah, RN Registered Nurse Nurse    BC 08/02/22 -  Collett, Brittany, RN Registered Nurse Nurse    AC 10/13/22 -  Jaya Castillo, RN Registered Nurse Nurse    BD 08/05/21 -  Radha Lynn, RN Registered Nurse Nurse    KM 05/24/22 -  Mao Vázquez, PT Physical Therapist PT    EB 09/22/22 -  Cedric Oliver, RN Registered Nurse Nurse    KK 06/27/22 -  Chelsi Bolivar, RN Registered Nurse Nurse    DL 03/16/22 -  Francy aGrcia, RN Registered Nurse Nurse    KG 09/22/22 -  Verónica Rodriguez, RN Registered Nurse Nurse    RW 09/22/22 -  Bethany Schofield, RN Registered Nurse Nurse    RH 08/03/22 - 12/21/22 Jung Russell RN [facility] Registered Nurse Nurse                PT Recommendation and Plan  Anticipated Discharge Disposition (PT): home with 24/7 care  Planned Therapy Interventions (PT): balance training, bed mobility training,  gait training, home exercise program, manual therapy techniques, motor coordination training, neuromuscular re-education, patient/family education, postural re-education, strengthening, transfer training  Therapy Frequency (PT): 5 times/wk  Progress Summary (PT)  Progress Toward Functional Goals (PT): progress toward functional goals is good, prepare for discharge  Plan of Care Reviewed With: patient  Progress: improving     Outcome Measures     Row Name 01/13/23 1300 01/13/23 1128 01/12/23 1521       How much help from another person do you currently need...    Turning from your back to your side while in flat bed without using bedrails? 4  -CT -- --    Moving from lying on back to sitting on the side of a flat bed without bedrails? 4  -CT -- --    Moving to and from a bed to a chair (including a wheelchair)? 3  -CT -- --    Standing up from a chair using your arms (e.g., wheelchair, bedside chair)? 3  -CT -- --    Climbing 3-5 steps with a railing? 2  -CT -- --    To walk in hospital room? 3  -CT -- --    AM-PAC 6 Clicks Score (PT) 19  -CT -- --       How much help from another is currently needed...    Putting on and taking off regular lower body clothing? -- 3  -KR 3  -LM    Bathing (including washing, rinsing, and drying) -- 3  -KR 3  -LM    Toileting (which includes using toilet bed pan or urinal) -- 3  -KR 3  -LM    Putting on and taking off regular upper body clothing -- 3  -KR 3  -LM    Taking care of personal grooming (such as brushing teeth) -- 3  -KR 3  -LM    Eating meals -- 4  -KR 4  -LM    AM-PAC 6 Clicks Score (OT) -- 19  -KR 19  -LM       Functional Assessment    Outcome Measure Options AM-PAC 6 Clicks Basic Mobility (PT)  -CT -- AM-PAC 6 Clicks Daily Activity (OT)  -LM    Row Name 01/11/23 1500             How much help from another is currently needed...    Putting on and taking off regular lower body clothing? 3  -LM      Bathing (including washing, rinsing, and drying) 3  -LM      Toileting  (which includes using toilet bed pan or urinal) 3  -LM      Putting on and taking off regular upper body clothing 3  -LM      Taking care of personal grooming (such as brushing teeth) 3  -LM      Eating meals 4  -LM      AM-PAC 6 Clicks Score (OT) 19  -LM         Functional Assessment    Outcome Measure Options AM-PAC 6 Clicks Daily Activity (OT)  -LM            User Key  (r) = Recorded By, (t) = Taken By, (c) = Cosigned By    Initials Name Provider Type    KR Andres Uriarte, OT Occupational Therapist    CT Bhargavi Vásquez, PT Physical Therapist    LM Татьяна Bah, OT Occupational Therapist                 Time Calculation:    PT Charges     Row Name 01/13/23 1300             Time Calculation    Start Time 1030  -CT      PT Received On 01/13/23  -CT         Time Calculation- PT    Total Timed Code Minutes- PT 25 minute(s)  -CT            User Key  (r) = Recorded By, (t) = Taken By, (c) = Cosigned By    Initials Name Provider Type    CT Bhargavi Vásquez, PT Physical Therapist              Therapy Charges for Today     Code Description Service Date Service Provider Modifiers Qty    22570664821 HC GAIT TRAINING EA 15 MIN 1/12/2023 Bhargavi Vásquez, PT GP 1    2619340 HC PT THER PROC EA 15 MIN 1/12/2023 Bhargavi Vásquez, PT GP 1    54835010033 HC PT THERAPEUTIC ACT EA 15 MIN 1/13/2023 Bhargavi Vásquez, PT GP 2          PT G-Codes  Outcome Measure Options: AM-PAC 6 Clicks Basic Mobility (PT)  AM-PAC 6 Clicks Score (PT): 19  AM-PAC 6 Clicks Score (OT): 19    PT Discharge Summary  Anticipated Discharge Disposition (PT): home with 24/7 care    Bhargavi Vásquez, PT  1/13/2023

## 2023-01-13 NOTE — THERAPY DISCHARGE NOTE
Acute Care - Occupational Therapy Discharge Summary   [facility]     Patient Name: Tre Kinney  : 1932  MRN: [id number]    Today's Date: 2023  Onset of Illness/Injury or Date of Surgery: 10/06/22       Referring Physician: Janneth      Admit Date: 10/5/2022        OT Recommendation and Plan    Visit Dx:    ICD-10-CM ICD-9-CM   1. Generalized weakness  R53.1 780.79          Time Calculation- OT     Row Name 23 1128             Time Calculation- OT    Total Timed Code Minutes- OT 30 minute(s)  -KR            User Key  (r) = Recorded By, (t) = Taken By, (c) = Cosigned By    Initials Name Provider Type    Andres Ayala, OT Occupational Therapist                       Outcome Measures     Row Name 23 1128 23 1521 23 1500       How much help from another is currently needed...    Putting on and taking off regular lower body clothing? 3  -KR 3  -LM 3  -LM    Bathing (including washing, rinsing, and drying) 3  -KR 3  -LM 3  -LM    Toileting (which includes using toilet bed pan or urinal) 3  -KR 3  -LM 3  -LM    Putting on and taking off regular upper body clothing 3  -KR 3  -LM 3  -LM    Taking care of personal grooming (such as brushing teeth) 3  -KR 3  -LM 3  -LM    Eating meals 4  -KR 4  -LM 4  -LM    AM-PAC 6 Clicks Score (OT) 19  -KR 19  -LM 19  -LM       Functional Assessment    Outcome Measure Options -- AM-PAC 6 Clicks Daily Activity (OT)  -LM AM-PAC 6 Clicks Daily Activity (OT)  -LM          User Key  (r) = Recorded By, (t) = Taken By, (c) = Cosigned By    Initials Name Provider Type    Andres Ayala, OT Occupational Therapist    LM Татьяна Bah, OT Occupational Therapist                Timed Therapy Charges  Total Units: 1    Suggested Charges  Total Units: 1    Procedure Name Documented Minutes Units Code    HC OT THER PROC EA 15 MIN 12  1    81233 (CPT®)      HC OT THERAPEUTIC ACT EA 15 MIN 5  0    07576 (CPT®)               Documented Minutes  Total Minutes: 17     Therapy Provided Minutes    12469 - OT Therapeutic Exercise Minutes 12    75580 - OT Therapeutic Activity Minutes 5                Therapy Charges for Today     Code Description Service Date Service Provider Modifiers Qty    57511318375 HC OT SELF CARE/MGMT/TRAIN EA 15 MIN 1/13/2023 Andres Uriarte OT GO 2          OT Discharge Summary  Anticipated Discharge Disposition (OT): home with assist  Reason for Discharge: Discharge from facility  Discharge Destination: Home with assist      Andres Uriarte OT  1/13/2023

## 2023-01-13 NOTE — DISCHARGE PLACEMENT REQUEST
"Tre Kinney (89 y.o. Female)     Date of Birth   12/09/1932    Social Security Number       Address   [address] [address]    Home Phone   [phone]    MRN   [id number]       [employer]ism   Protestant    Marital Status                               Admission Date   10/5/22    Admission Type   Urgent    Admitting Provider   Jannet Lagunas MD    Attending Provider   Dante Levine MD    Department, Room/Bed   [address], [address]/       Discharge Date       Discharge Disposition   [place] [facility] [place]charge Destination                               Attending Provider: Dante Levine MD    Allergies: Adhesive Tape, Xanax [Alprazolam]    Isolation: None   Infection: None   Code Status: No CPR    Ht: 170.2 cm (67.01\")   Wt: 75.2 kg (165 lb 12.8 oz)    Admission Cmt: None   Principal Problem: Generalized weakness [R53.1]                 Active Insurance as of 10/5/2022     Primary Coverage     Payor Plan Insurance Group Employer/Plan Group    ANTHEM MEDICARE REPLACEMENT ANTHEM MEDICARE ADVANTAGE [id number]     Payor Plan Address Payor Plan Phone Number Payor Plan Fax Number Effective Dates    [address] [phone]  1/1/2022 - None Entered    [facility] [id number]       Subscriber Name Subscriber Birth Date Member ID       TRE KINNEY 12/9/1932 [id number]                 Emergency Contacts      (Rel.) Home Phone Work Phone Mobile Phone    Valdez Kinney (Son) [phone] -- --    Leno Kinney (Son) [phone] -- [phone]    Willian Kinney (Son) -- -- [phone]            Insurance Information                ANTHEM MEDICARE REPLACEMENT/ANTHEM MEDICARE ADVANTAGE Phone: [phone]    Subscriber: rTe Kinney Subscriber#: [id number]    Group#: [id number] Precert#: --          Discharge Order (From admission, onward)     Start     Ordered    01/13/23 0840  Discharge patient  Once        Expected Discharge Date: 01/13/23  "   Expected Discharge Time: Morning    [place] [facility] [place]    Physician of Record for Attribution - Please select from Treatment Team: IMTIAZ CUEVAS [[id number]]    Review needed by CMO to determine Physician of Record: No       Question Answer Comment   Physician of Record for Attribution - Please select from Treatment Team IMTIAZ CUEVAS    Review needed by CMO to determine Physician of Record No        01/13/23 0849

## 2023-01-13 NOTE — DISCHARGE PLACEMENT REQUEST
[address] [address]  Phone:  [phone]  Fax:  [phone] Date: 2023      Ambulatory Referral to Home Health     Patient:  Tre Kinney MRN:  [id number]   Bernadette VAZQUEZ [facility] [address] :  1932  SSN:    Phone: [phone] Sex:  F      INSURANCE PAYOR PLAN GROUP # SUBSCRIBER ID   Primary:    [employer]HE[employer] MEDICARE REPLACEMENT [id number]      Referring Provider Information:  IMTIAZ CUEVAS Phone: [phone] Fax: [phone]       Referral Information:   # Visits:  999 Referral Type: Home Health [42]   Urgency:  Routine Referral Reason: Specialty Services Required   Start Date: 2023 End Date:  To be determined by Insurer   Diagnosis: Generalized weakness (R53.1 [ICD-10-CM] 780.79 [ICD-9-CM])      Refer to Dept:   Refer to Provider:   Refer to Provider Phone:   Refer to Facility:       Face to Face Visit Date: 2023  Follow-up provider for Plan of Care? I treated the patient in an acute care facility and will not continue treatment after discharge.  Follow-up provider: LUCIO SULLIVAN [[id number]]  Reason/Clinical Findings: Debility, advanced age  Describe mobility limitations that make leaving home difficult: Debility, advanced age  Nursing/Therapeutic Services Requested: Skilled Nursing  Nursing/Therapeutic Services Requested: Physical Therapy  Skilled nursing orders: Medication education  Frequency: 1 Week 1     This document serves as a request of services and does not constitute Insurance authorization or approval of services.  To determine eligibility, please contact the members Insurance carrier to verify and review coverage.     If you have medical questions regarding this request for services. Please contact [address] at [phone] during normal business hours.        Authorizing Provider:Imtiaz Cuevas MD  Authorizing Provider's NPI: [id number]  Order Entered By: Imtiaz Cuevas MD  "1/13/2023  8:49 AM     Electronically signed by: Dante Levine MD 1/13/2023  8:49 AM       Tre Guajardo (89 y.o. Female)     Date of Birth   12/09/1932    Social Security Number       Address   [facility] [place] [id number]    Home Phone   [phone]    MRN   [id number]       Anglican   Buddhism    Marital Status                               Admission Date   10/5/22    Admission Type   Urgent    Admitting Provider   Jannet Lagunas MD    Attending Provider   Dante Levine MD    Department, Room/Bed   [address], 3327/1P       Discharge Date       Discharge Disposition   Home-Health Care [facility]    Discharge Destination                               Attending Provider: Dante Levine MD    Allergies: Adhesive Tape, Xanax [Alprazolam]    Isolation: None   Infection: None   Code Status: No CPR    Ht: 170.2 cm (67.01\")   Wt: 75.2 kg (165 lb 12.8 oz)    Admission Cmt: None   Principal Problem: Generalized weakness [R53.1]                 Active Insurance as of 10/5/2022     Primary Coverage     Payor Plan Insurance Group Employer/Plan Group    ANTHEM MEDICARE REPLACEMENT ANTHEM MEDICARE ADVANTAGE [id number]     Payor Plan Address Payor Plan Phone Number Payor Plan Fax Number Effective Dates    [address] [phone]  1/1/2022 - None Entered    [facility] [address]       Subscriber Name Subscriber Birth Date Member ID       TRE GUAJARDO 12/9/1932 [id number]                 Emergency Contacts      (Rel.) Home Phone Work Phone Mobile Phone    Valdez Guajardo (Son) [phone] -- --    Leno Guajardo (Son) [phone] -- [phone]    Willian Guajardo (Son) -- -- [phone]            Insurance Information                ANTHEM MEDICARE REPLACEMENT/ANTHEM MEDICARE ADVANTAGE Phone: [phone]    Subscriber: Tre Guajardo Subscriber#: [id number]    Group#: [id number] Precert#: --             History & Physical      Jannet Lagunas MD at 10/05/22 " 1253              [facility] Medicine Services  History & Physical    Patient Identification:  Name:  Tre Kinney  Age:  89 y.o.  Sex:  female  :  1932  MRN:  [id number]   Visit Number:  [id number]  Primary Care Physician:  Jessica Pedro APRN      Please note that the patient is being admitted from [facility] hospitalist service to [facility] swing bed service for generalized debility as she needs strengthening.  The following is a summary of the [facility] hospitalization:      Date of Admission: 2022  Date of Discharge: 10/5/2022, to [facility] swing bed     PCP: Jessica Pedro APRN     DISCHARGE DIAGNOSIS  -Acute blood loss anemia that was present on admission and due to a right hip traumatic hematoma in the setting of the supratherapeutic INR, status post 3 units packed red blood cells transfused this admission  -Supratherapeutic INR  that was present on admission, in a patient on chronic anticoagulation with Coumadin  for paroxysmal atrial fibrillation (goal INR 2.0-3.0), nail anticoagulation has been discontinued  -Acute superior endplate compression fracture of L1 with retropulsion (2022)  -Leukocytosis on admission, suspect due to mechanical fall that occurred prior to admission  -Acute kidney injury on top of chronic kidney disease stage IV (baseline creatinine 2.1-2.4 and admission creatinine 6.1)  -Anion gap metabolic acidosis present on admission, suspect due to a combination of lactic acidosis from the acute blood loss anemia and renal acidosis  -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 Vazquez and SANDRA Jaquez with palliative care  Dr. Sanchez with nephrology  Dr. Bell with general surgery  Dr. Ho and SANDRA Xiong with orthopedic surgery     PROCEDURES PERFORMED  9/19/2022: Transfusion of two units of PRBCs on each day  9/21/2022: Transfusion of one unit of PRBCs on each day  9/19/2022-9/25/2022:  Left cephalic single lumen midline catheter     HOSPITAL COURSE  Patient is a  89 y.o.  female presented to [facility] complaining of a mechanical fall with inability to get up.  In our emergency department, the patient was noted to have a hemoglobin level of 6.3.  Thus, she was admitted to the progressive care unit for further evaluation and treatment.  Please see the admitting history and physical for further details.  On initial exam, a large right hip hematoma was seen and was suspected to be the cause of the acute blood loss.  She was given Kcentra in the emergency department as well as vitamin K.  She ended up needing 3 units during the first 3 days of her hospitalization.  Afterwards, her hemoglobin was stable and she did not need any further treatment.  The hematoma seem to be resolving daily during her hospitalization.  Since the patient had a diverticular bleed in 2018 and has re-bled again now to the point of acute blood loss anemia, it has been determined that the risk of anticoagulation outweighs the benefit in this patient will be discharged without any anticoagulation.    Also, the patient had DIANA on top CKD stage IV on admission; nephrology was consulted.  With IV fluids (the initial ones containing bicarb) and blood transfusions, her renal function stabilized to normal and she will just need follow up with nephrology once she is out of this hospital.     Incidentally, the patient had a vertebral fracture that we believe occurred earlier this month.  Also, there was  question of a right distal tib fracture and as such orthopedic surgery was consulted.  No acute fractures were present per their evaluation and the patient was instructed to ambulate and weight-bear as tolerated.  PT and OT saw the patient and she is severely weak; thus, she will be admitted to swing bed for strengthening.     The patient reported dysuria later in the hospitalization; UA was abnormal but no bacteria grew on the culture.  Our team started Rocephin and this was continued even after the urine culture did not grow because her dysuria resolved.  Please note that the patient finished a course of Rocephin for an acute UTI while she was hospitalized.       During the end of her hospitalization, she had dyspnea.  She was on Lasix at home and chest x-ray here showed some mild CHF.  Thus, we gave the patient some doses of IV Bumex and then increase the home Lasix from 40 mg once a day to 40 mg twice a day.  We will periodically check her blood work to see how the creatinine and the electrolytes are trending.  Please note that we did obtain a Reds vest measurement and it was elevated prior to the doses of IV Bumex.  However, today's Reds vest measurement indicates that her lungs are now dried out.            ----------------------------------------------------------------------------------------------------------------------    The following is the history and physical for the [facility] hospitalization that is summarized above:  Now  Subjective     Chief complaint:  Fall    History of presenting illness:  Tre Kinney is a 89 y.o. female with past medical history significant for CHF, chronic kidney disease, coronary artery disease s/p stenting, history of blood clots, chronic anticoagulation with Coumadin, SSS s/p pacemaker placement, chronic diastolic CHF, and iron deficiency anemia who presents today with a chief complaint of a fall.     Patient reports that she has had 2 falls within the past  month.  For the first fall she was evaluated in the emergency department on September 8, 2022 and was complaining of pain in her bilateral lower back and abdomen.  On CT imaging of her lumbar spine she had a moderate acute superior endplate vertebral compression fracture at the L1 with about 40% loss of vertebral body height and mild posterior osseous retropulsion of about 6 mm and also noted to have chronic compression fracture deformities involving the inferior endplates of L3 and L4.  She was discharged home with a 3-day supply of Norco 7.5 mg, a back brace, and instructions to follow-up with Dr. Krishna in the outpatient setting. Patient reports that she wasn't able to attend her follow-up with orthopedic surgery as she lives with her son who has no vehicle and has no other form of transportation to make it to her doctor's appointments.     She presents today after having a mechanical fall while attempting to get to her telephone that was across the room in her bedroom. She states that she is not for sure what caused her to fall, but landed primarily on her right side. She denies any syncope, dizziness, light-headedness, chest pain, or shortness of breath that contributed to her fall. She does ambulate with a walker at home. She states that her other son that lives close by was able to come over and assist her out of the floor. EMS was called and transported her to the hospital. She was given Norco 5 mg in the ED and reports that she is currently not in any pain. It was noted on her labs that her hemoglobin was 6.1 upon arrival. She does have a large hematoma on her right side from her fall. She states she has also been having dark appearing stools at home. She denies any hematemesis or coffee ground emesis. She states her last EGD/colonoscopy was in 2018 when she was admitted to [facility] which is detailed below. She did state that she has had frequent episodes of midsternal chest pain mostly while lying in the bed  at night. She states that it will radiate to bilateral arms and she has associated nausea and diaphoresis. She reports that she will take a nitroglycerin which will relieve her chest pain. She also endorses dysuria and foul-smelling urine. She denies any abdominal pain, nausea, vomiting, or diarrhea. She is currently alert and oriented to self, place, date of birth, year, and president. She currently resides with her son who assists her with her medications.    Of note, patient was transferred from [facility] to [facility] in March of 2018 due to symptomatic acute blood loss anemia due to rectal bleeding.  She was also on Coumadin at that time for atrial fibrillation and prior blood clots.  GI services was consulted and she underwent an EGD and colonoscopy during that stay.  Her EGD showed no active bleeding but evidence of gastritis.  Colonoscopy revealed a few polyps that were not removed or biopsied  And also found to have diverticulosis and likely diverticular bleed since resolved.  She did receive 2 units packed red blood cells with no further bleeding noted.  Her Coumadin was held throughout her hospital stay and 3 days after discharge, but was resumed per PCP recommendations with them to manage Coumadin and on orders.    Upon arrival to the ED, vital signs were temperature 97.7, heart rate 87, respirations 18, blood pressure 127/60, SPO2 97% on room air.  CMP with glucose 137, sodium 130, potassium 5.5, CO2 17, chloride 93, anion gap 20, creatinine 6.11, BUN 31, EGFR 6.1, albumin 3.43, otherwise unremarkable.  CBC with WBC 15.87, RBC 2.16, hemoglobin 6.3, hematocrit 19.6, RDW 17, RDW-SD 55.5, otherwise unremarkable.    Known Emergency Department medications received prior to my evaluation included IV Rocephin, Norco 5 mg, IV Zofran 4 mg, IV phytonadione.     Patient will be admitted to the PCU for further evaluation and  monitoring.    ---------------------------------------------------------------------------------------------------------------------   Review of Systems   Constitutional: Positive for diaphoresis (associated with chest pain). Negative for activity change, chills and fever.   HENT: Negative for congestion and rhinorrhea.    Eyes: Negative for discharge and redness.   Respiratory: Negative for apnea, cough and shortness of breath.    Cardiovascular: Positive for chest pain (midsternal; radiates to bilateral arms). Negative for palpitations.   Gastrointestinal: Positive for nausea (associated with chest pain). Negative for abdominal distention, abdominal pain, diarrhea and vomiting.   Genitourinary: Positive for dysuria. Negative for difficulty urinating, frequency and urgency.        Reports foul smelling urine   Musculoskeletal: Positive for gait problem (uses walker to ambulate). Negative for arthralgias and myalgias.   Skin: Positive for color change (bruising from recent falls). Negative for wound.   Neurological: Negative for dizziness, syncope and light-headedness.   Psychiatric/Behavioral: Negative for agitation, behavioral problems and confusion.        ---------------------------------------------------------------------------------------------------------------------   Past Medical History:   Diagnosis Date   • Anemia    • Arthritis    • CHF (congestive heart failure) (HCC)    • Chronic kidney disease    • Coronary artery disease    • H/O blood clots    • History of transfusion     no reaction    • Hypertension      Past Surgical History:   Procedure Laterality Date   • ABDOMINAL SURGERY     • APPENDECTOMY     • CARDIAC SURGERY     •  SECTION     • COLONOSCOPY N/A 3/7/2018    Procedure: COLONOSCOPY;  Surgeon: Willie Gresham MD;  Location: [facility] ENDOSCOPY;  Service:    • CORONARY ANGIOPLASTY WITH STENT PLACEMENT     • ENDOSCOPY N/A 3/6/2018    Procedure: ESOPHAGOGASTRODUODENOSCOPY;  Surgeon: Willie KWAN  MD Mp;  Location: [facility];  Service:    • PACEMAKER IMPLANTATION      left side    • SKIN BIOPSY     • TUBAL ABDOMINAL LIGATION       Family History   Problem Relation Age of Onset   • Other Mother         cardiac disorder   • Hypertension Mother    • Heart disease Mother    • Thyroid disease Mother    • Other Father         cardiac disorder   • Heart disease Father    • Hypertension Father    • Other Sister         cardiac disorder   • Other Brother         cardiac disorder   • Other Other         cardiac disorder   • Other Other         cardiac disorder     Social History     Socioeconomic History   • Marital status:    Tobacco Use   • Smoking status: Never Smoker   Substance and Sexual Activity   • Alcohol use: No   • Drug use: No   • Sexual activity: Defer     ---------------------------------------------------------------------------------------------------------------------   Allergies:  Adhesive tape and Xanax [alprazolam]  ---------------------------------------------------------------------------------------------------------------------   Home medications:   Current listed hospital scheduled medications may not yet reflect those currently placed in orders that are signed and held awaiting patient's arrival to floor.   ---------------------------------------------------------------------------------------------------------------------      Objective      Vital Signs:  Temp:  [97.5 °F (36.4 °C)-98.1 °F (36.7 °C)] 98.1 °F (36.7 °C)  Heart Rate:  [71-93] 87  Resp:  [16-20] 20  BP: (114-157)/(49-87) 127/58  Vitals             09/19/22  1052   Weight: 72.6 kg (160 lb)         Body mass index is 25.06 kg/m².  ---------------------------------------------------------------------------------------------------------------------       Physical Exam  Constitutional:       General: She is awake.      Appearance: Normal appearance. She is overweight.      Interventions: Nasal cannula in place.       Comments: Resting comfortably in bed. Appears in no acute distress. Nasal cannula in place.    HENT:      Head: Normocephalic and atraumatic.      Right Ear: External ear normal.      Left Ear: External ear normal.      Nose: Nose normal.      Mouth/Throat:      Mouth: Mucous membranes are dry.      Pharynx: Oropharynx is clear.   Eyes:      Extraocular Movements: Extraocular movements intact.      Conjunctiva/sclera: Conjunctivae normal.      Pupils: Pupils are equal, round, and reactive to light.   Cardiovascular:      Rate and Rhythm: Normal rate and regular rhythm.      Pulses: Normal pulses.           Dorsalis pedis pulses are 2+ on the right side and 2+ on the left side.        Posterior tibial pulses are 2+ on the right side and 2+ on the left side.      Heart sounds: Normal heart sounds. No murmur heard.    No friction rub. No gallop.   Pulmonary:      Effort: Pulmonary effort is normal. No respiratory distress.      Breath sounds: Normal breath sounds. No wheezing, rhonchi or rales.      Comments: Nasal cannula placed for comfort; saturations currently 97%.   Abdominal:      General: Abdomen is flat. Bowel sounds are normal. There is no distension.      Palpations: Abdomen is soft.      Tenderness: There is no abdominal tenderness. There is no guarding.   Musculoskeletal:         General: Normal range of motion.      Right lower leg: No edema.      Left lower leg: No edema.   Skin:     General: Skin is warm and dry.      Coloration: Skin is pale.      Findings: Bruising present. No erythema.          Neurological:      General: No focal deficit present.      Mental Status: She is alert and oriented to person, place, and time. Mental status is at baseline.      Comments: Alert and oriented x 4. No focal deficits noted.    Psychiatric:         Mood and Affect: Mood normal.         Behavior: Behavior normal. Behavior is cooperative.         Thought Content: Thought content normal.          ---------------------------------------------------------------------------------------------------------------------  EKG:            Telemetry:       I have personally looked at both the EKG and the telemetry strips.  ---------------------------------------------------------------------------------------------------------------------     Results from last 7 days   Lab Units 09/19/22  2350 09/19/22  2124 09/19/22  1608 09/19/22  1521   LACTATE mmol/L  --  1.3 2.3*  --    WBC 10*3/mm3  --   --   --  15.87*   HEMOGLOBIN g/dL  --   --   --  6.3*   HEMATOCRIT %  --   --   --  19.6*   MCV fL  --   --   --  90.7   MCHC g/dL  --   --   --  32.1   PLATELETS 10*3/mm3  --   --   --  250   INR   >10.00*  --   --   --            Results from last 7 days   Lab Units 09/19/22  1521   SODIUM mmol/L 130*   POTASSIUM mmol/L 5.5*   CHLORIDE mmol/L 93*   CO2 mmol/L 17.0*   BUN mg/dL 91*   CREATININE mg/dL 6.11*   CALCIUM mg/dL 9.0   GLUCOSE mg/dL 137*   ALBUMIN g/dL 3.43*   BILIRUBIN mg/dL 0.9   ALK PHOS U/L 77   AST (SGOT) U/L 19   ALT (SGPT) U/L 8   Estimated Creatinine Clearance: 7.2 mL/min (A) (by C-G formula based on SCr of 6.11 mg/dL (H)).         Results from last 7 days   Lab Units 09/19/22  1521   TROPONIN T ng/mL 0.036*           Results from last 7 days   Lab Units 09/19/22  1521   PROBNP pg/mL 11,666.0*            Lab Results   Component Value Date     TSH 4.999 (H) 11/10/2017      ---------------------------------------------------------------------------------------------------------------------  Imaging Results (Last 7 Days)      Procedure Component Value Units Date/Time     CT Abdomen Pelvis Without Contrast [[id number]] Collected: 09/19/22 2205       Updated: 09/19/22 2207     Narrative:       CT Abdomen Pelvis WO     INDICATION:   Abdominal pain.     TECHNIQUE:   CT of the abdomen and pelvis without IV contrast. Coronal and sagittal reconstructions were obtained.  Radiation dose reduction techniques included  automated exposure control or exposure modulation based on body size. Count of known CT and cardiac nuc  med studies performed in previous 12 months: 2.      COMPARISON:   None available.     FINDINGS:  Abdomen: There is mild scarring and volume loss at the left lung base. There is a moderate-sized hiatal hernia. The liver contains a small cyst or hemangioma in the right lobe measuring 1.5 cm in diameter with a similar benign-appearing lesion in the  lateral segment of the left lobe just under the capsule measuring 1 cm. The spleen and pancreas are normal in size. There is bilateral renal atrophy, worse on the right. There is a 3.5 cm right renal cyst. No hydronephrosis. The adrenal glands are normal  in size. No evidence of retroperitoneal adenopathy. No distended bowel loops.     Pelvis: No appendix is seen. No inflammatory changes are seen around the cecum. There is sigmoid diverticulosis. No acute inflammatory changes are seen. No enlarged pelvic lymph nodes are seen. There is subcutaneous streaking of fat in the right gluteal  area that could represent either inflammatory change or hematoma. It is new since a pelvic CT on 922.        Impression:       Subcutaneous swelling in the gluteal area most likely representing a gluteal hematoma. This is a new finding since the recent pelvic CT. No acute or inflammatory changes are seen elsewhere in the abdomen or pelvis. Also noted is a hiatal hernia, renal  atrophy and diverticulosis.              Signer Name: Bubba Mancuso MD   Signed: 9/19/2022 10:05 PM   Workstation Name: [id number]    [facility]     CT Lower Extremity Right Without Contrast [[id number]] Collected: 09/19/22 1440       Updated: 09/19/22 1443     Narrative:       EXAMINATION: CT LOWER EXTREMITY RIGHT WO CONTRAST-      CLINICAL INDICATION: tib/fib (questionable tibial plateau and distal  tibia fracture fracture on x-ray)        COMPARISON: X-rays of the right lower extremity  dated 09/19/2022     Radiation dose reduction techniques were utilized per ALARA protocol.  Automated exposure control was initiated through either or Spreaker or  DoseRight software packages by  protocol.           PROCEDURE: Axial images were acquired from the distal femur through the  ankle without any IV contrast. Reformatted images were created.     FINDINGS:  There is joint space narrowing at the knee.  Irregularity of the lateral tibial plateau probably due to an old injury  and or arthritic change, but no convincing evidence of acute fracture on  the presented study     Otherwise no evidence of acute fracture.        Impression:       1. Extensive arthritic change at the knee, but no acute fracture is seen        This report was finalized on 9/19/2022 2:41 PM by Dr. Bob Lovell MD.        XR Hip With or Without Pelvis 2 - 3 View Right [[id number]] Collected: 09/19/22 1306       Updated: 09/19/22 1332     Narrative:       EXAM:    XR Right Hip With Pelvis When Performed, 2 or 3 Views     EXAM DATE:    9/19/2022 12:11 PM     CLINICAL HISTORY:    trauma     TECHNIQUE:    Two or three views of the right hip with pelvis when performed.     COMPARISON:    No relevant prior studies available.     FINDINGS:    Bones/joints:  Unremarkable.  No acute fracture.  No dislocation.    Soft tissues:  Unremarkable.        Impression:         No acute findings in the right hip.     This report was finalized on 9/19/2022 1:07 PM by Dr. Bob Lovell MD.        XR Femur 2 View Right [[id number]] Collected: 09/19/22 1306       Updated: 09/19/22 1308     Narrative:       EXAM:    XR Right Femur, 2 Views     EXAM DATE:    9/19/2022 12:12 PM     CLINICAL HISTORY:    fall     TECHNIQUE:    Frontal and lateral views of the right femur.     COMPARISON:    No relevant prior studies available.     FINDINGS:    Bones/joints:  Unremarkable.  No acute fracture.  No dislocation.    Soft tissues:  Unremarkable.        Impression:          No acute findings in the right femur.     This report was finalized on 9/19/2022 1:06 PM by Dr. Bob Lovell MD.        XR Tibia Fibula 2 View Right [[id number]] Collected: 09/19/22 1305       Updated: 09/19/22 1308     Narrative:       EXAM:    XR Right Tibia and Fibula, 2 Views     EXAM DATE:    9/19/2022 12:11 PM     CLINICAL HISTORY:    fall     TECHNIQUE:    Frontal and lateral views of the right tibia and fibula.     COMPARISON:    No relevant prior studies available.     FINDINGS:    Bones/joints:  Mild irregularity of the lateral tibial plateau. Cannot  exclude nondisplaced fracture.  No dislocation.    Soft tissues:  Unremarkable.  No radiopaque foreign body.    Other findings:  Demineralization.        Impression:         Mild irregularity of the lateral tibial plateau. Cannot exclude  nondisplaced fracture.     This report was finalized on 9/19/2022 1:06 PM by Dr. Bob Lovell MD.        XR Ankle 3+ View Right [[id number]] Collected: 09/19/22 1304       Updated: 09/19/22 1307     Narrative:       EXAM:    XR Right Ankle Complete, 3 or More Views     EXAM DATE:    9/19/2022 12:11 PM     CLINICAL HISTORY:    fall     TECHNIQUE:    Frontal, lateral and oblique views of the right ankle.     COMPARISON:    No relevant prior studies available.     FINDINGS:    Bones/joints:  Oblique lucency through the distal tibia medially may  represent a nondisplaced fracture.  Old fracture of the distal fibula.   No dislocation.    Soft tissues:  Unremarkable.        Impression:         Oblique lucency through the distal tibia medially may represent a  nondisplaced fracture.     This report was finalized on 9/19/2022 1:05 PM by Dr. Bob Lovell MD.        XR Foot 3+ View Right [[id number]] Collected: 09/19/22 1304       Updated: 09/19/22 1306     Narrative:       EXAM:    XR Right Foot Complete, 3 or More Views     EXAM DATE:    9/19/2022 12:11 PM     CLINICAL HISTORY:    fall     TECHNIQUE:    Frontal, lateral and  oblique views of the right foot.     COMPARISON:    No relevant prior studies available.     FINDINGS:    Bones/joints:  Unremarkable.  No acute fracture.  No dislocation.    Soft tissues:  Unremarkable.  No radiopaque foreign body.        Impression:         No acute findings in the right foot.     This report was finalized on 9/19/2022 1:04 PM by Dr. Bob Lovell MD.          Last echocardiogram:  Results for orders placed during the hospital encounter of 09/19/22    Adult Transthoracic Echo Complete W/ Cont if Necessary Per Protocol    Interpretation Summary  · Left ventricular ejection fraction appears to be 51 - 55%. Left ventricular systolic function is low normal.  · Left ventricular wall thickness is consistent with mild concentric hypertrophy.  · Left ventricular diastolic function was indeterminate.  · The right ventricular cavity is moderately dilated.  · Moderately reduced right ventricular systolic function noted.  · The left atrial cavity is moderate to severely dilated  · The right atrial cavity is moderately dilated.  · Moderate tricuspid valve regurgitation is present.  · Estimated right ventricular systolic pressure from tricuspid regurgitation is markedly elevated (>55 mmHg).  · Severe pulmonary hypertension is present.  · Electronic lead present in the ventricle  · There is no evidence of pericardial effusion    I have personally reviewed the above radiology images and read the final radiology report on 10/05/22  ---------------------------------------------------------------------------------------------------------------------  Assessment / Plan     ASSESSMENT/PLAN:  -Acute life-threatening anemia likely secondary to large gluteal hematoma, POA  -History of acute blood loss anemia in 2018 suspected to be from diverticular bleed   -History of iron deficiency anemia   -Hemoglobin and hematocrit upon arrival 6.3 and 19.6 respectively; previously during ED visit on 09/08 hemoglobin 12.4 and  hematocrit 38.8.   -Last colonoscopy/EGD in 2018 during [facility] admission. EGD showed no active bleeding but evidence of gastritis; Colonoscopy revealed a few polyps that were not removed or biopsied, also found to have diverticulosis and likely diverticular bleed since resolved.  -Reports episodes of melena at home; will monitor if patient has additional episodes of melena during admission.   -ED provider ordered 2 U PRBC's; nursing communication in chart to order repeat CBC after first unit of blood.   -Obtain iron, vitamin B12, and folate.    -Possible acute, nondisplaced, right lateral tibial plateau and distal tibia fractures, POA  -Traumatic right gluteal hematoma, POA  -Acute superior endplate compression fracture of L1 with retropulsion (09/08/22)  -Leukocytosis, likely reactive to fall  -Mechanical fall  -XR of right lower extremity with findings noted above.   -CT abdomen/pelvis with subcutaneous swelling swelling in gluteal area representing gluteal hematoma.   -Vertebral fracture found during 09/08/22 ED visit 2/2 fall and was discharged home with back brace with instruction to follow-up with orthopedic surgery, but missed appointment due to transportation.   -Inpatient orthopedic and general surgery consulted, input and assistance much appreciated.   -Will keep NPO pending surgical evaluation.   -Up with assistance, fall precautions.   -Neurovascular checks every 4 hours.   -PT/OT consults ordered.     -Acute on chronic kidney disease stage IV, POA  -Bilateral renal atrophy, R > L   -Right renal cyst (3.5 cm)  -Anion-gap metabolic acidosis   -Lactic acidosis   -Mild hyperkalemia   -Creatinine upon arrival 6.11; previously during ED visit on 09/08 creatinine 2.45; baseline appears to be 2.1-2.4.   -CT A/P reviewed with findings noted above.   -Lactate 2.3 upon arrival, but has normalized after fluids.   -Received 500 mL fluid bolus in ED; will hold on additional fluids at this time as 2 units of blood to be  transfused.   -Inpatient nephrology consulted, input and assistance much appreciated.   -Obtain creatinine kinase to evaluate for rhabdomyolysis in setting of fall.   -Obtain urine electrolytes and urine protein/creatinine ratio.   -Avoid nephrotoxins as able; planning to hold home Lasix and Neurontin.     -Supratherapeutic INR on chronic anticoagulation with Coumadin (goal INR 2.0-3.0)  -History of blood clots, details unknown  -Paroxysmal atrial fibrillation   -SSS s/p pacemaker placement  -PT and PTT > 100 and INR > 10 upon arrival.   -Warfarin reversal initiated in ED with IV Vitamin K 10 units and IV KCentra 3630 units.   -Daily PT/INR ordered.   -Will hold Coumadin for now.     -Chest pain with typical features, POA  -Coronary artery disease s/p stenting   -Reports intermittent episodes of midsternal chest pain at home.   -Troponin T 0.036; continue to trend.   -EKG without acute ischemic abnormalities present.   -Obtain a.m. TTE.   -May consider cardiology consult.     -Acute urinary tract infection, POA  -Reported dysuria  -UA with 3 + leukocytes, 21-30 WBC, 2+ bacteria, 36 comets epithelium  -Pending urine culture.  -Received IV Rocephin in ED; will continue on admission as patient is symptomatic.    -Chronic diastolic CHF  -Echo from 12/2017 reviewed and noted above.   -Appears euvolemic on exam.   -Obtain TTE as noted above.   -Monitor with strict I/O's and daily weights.     -Generalized anxiety disorder  -Supportive care.   -Review home medications once reconciled.     -Debility  -Palliative care consult.   - consult.   ----------  -DVT prophylaxis: SCD's contraindicated 2/2 frature  -Activity: Up with assistance, fall precautions  -Expected length of stay:   INPATIENT status due to the need for care which can only be reasonably provided in an hospital setting such as aggressive/expedited ancillary services and/or consultation services, the necessity for IV medications, close physician  monitoring and/or the possible need for procedures.  In such, I feel patient's risk for adverse outcomes and need for care warrant INPATIENT evaluation and predict the patient's care encounter to likely last beyond 2 midnights.    Medical Intervention Limits: NO intubation (DNI)  Level Of Support Discussed With: Patient; Next of Kin (If No Surrogate)  Code Status (Patient has no pulse and is not breathing): No CPR (Do Not Attempt to Resuscitate)  Medical Interventions (Patient has pulse or is breathing): Limited Support  Comments: Patient states that she does not want to be resuscitated or put on a ventilator, her son Leno supports her decision    High risk secondary to acute life-threatening anemia likely secondary to large gluteal hematoma, possible acute nondisplaced right lateral tibial plateau and distal tibia fractures, acute on chronic CKD stage IV, supratherapeutic INR     Disposition: Pending clinical course    Michelle Sellers PA-C  09/20/22  01:28 EDT    ---------------------------------------------------------------------------------------------------------------------       Jannet Lagunas MD  [facility]  10/05/22  16:57 EDT      Electronically signed by Jannet Lagunas MD at 10/05/22 1706          Physician Progress Notes (most recent note)      Dante Levine MD at 01/12/23 1219        Ms. Kinney is our 88 yo F with hx CHF, chronic kidney disease, coronary artery disease s/p stenting, history of blood clots, chronic anticoagulation with Coumadin, SSS s/p pacemaker placement, chronic diastolic CHF, and iron deficiency anemia who initially presented here after a fall. Patient found to have traumatic hematoma of right hip. Patient found to be anemic requiring 3 units of pRBCs. INR was high on presentation and reversed. Patient had DIANA that stabilized. Patient with chronic vertebral and rib fractures that were evaluated by orthopedic surgery. Patient also had volume  overolad requiring diuresis during admission. Patient admitted to swing bed for debility on 10/5/22 and stay has been prolonged by difficult disposition. Apixiban has been restarted, patient tolerating with stable hemoglobin on 1/8 of 10.2. Chart reviewed including nursing notes. No new complaints noted. Patient worked with therapy yesterday. Patient has not had repeat chest pain. Podiatry consulted at patient's request yesterday to cut her nails. Swing bed did not approve more days. Staff discussed with patient and family and they are planning on returning home on .  PT/OT/SW following    Electronically signed by Dante Levine MD at 23 1232          Physical Therapy Notes (most recent note)      Bhargavi Vásquez, PT at 23 1536  Version 1 of 1         Acute Care - Physical Therapy Treatment Note  [facility]     Patient Name: Tre Kinney  : 1932  MRN: [id number]  Today's Date: 2023   Onset of Illness/Injury or Date of Surgery: 10/06/22  Visit Dx:   No diagnosis found.  Patient Active Problem List   Diagnosis   • Acid reflux   • Ankle arthralgia   • Cardiac conduction disorder   • Closed fracture of distal fibula   • Arteriosclerosis of coronary artery   • Fracture of distal end of tibia   • Elevated cholesterol   • BP (high blood pressure)   • Hypertrophic polyarthritis   • OP (osteoporosis)   • Right heart failure (HCC)   • Heart failure, chronic, right-sided (HCC)   • SOB (shortness of breath)   • Palpitations   • CHF (congestive heart failure) (HCC)   • Congestive heart failure (HCC)   • Precordial pain   • Chronic anticoagulation   • Hypokalemia   • Anemia, unspecified type   • Generalized weakness     Past Medical History:   Diagnosis Date   • Anemia    • Arthritis    • CHF (congestive heart failure) (HCC)    • Chronic kidney disease    • Coronary artery disease    • H/O blood clots    • History of transfusion     no reaction    • Hypertension      Past Surgical History:    Procedure Laterality Date   • ABDOMINAL SURGERY     • APPENDECTOMY     • CARDIAC SURGERY     •  SECTION     • COLONOSCOPY N/A 3/7/2018    Procedure: COLONOSCOPY;  Surgeon: Willie Gresham MD;  Location:  [facility];  Service:    • CORONARY ANGIOPLASTY WITH STENT PLACEMENT     • ENDOSCOPY N/A 3/6/2018    Procedure: ESOPHAGOGASTRODUODENOSCOPY;  Surgeon: Willie Gresham MD;  Location:  [facility];  Service:    • PACEMAKER IMPLANTATION      left side    • SKIN BIOPSY     • TUBAL ABDOMINAL LIGATION       PT Assessment (last 12 hours)     PT Evaluation and Treatment     Row Name 23 1040          Physical Therapy Time and Intention    Subjective Information no complaints  -CT     Document Type therapy note (daily note)  -CT     Mode of Treatment physical therapy;individual therapy  -CT     Patient Effort good  -CT     Comment Pts treatment session [id number]. Pelon Dorsey via [employer]. Pt ambulate x 2 and performed seated and standing ther ex. Anticipated d/c from PT services 10/6. Pt and family educated on use of RW and HEP.  -CT     Row Name 23 1040          General Information    Patient Profile Reviewed yes  -CT     Row Name 23 1040          Cognition    Affect/Mental Status (Cognition) WFL  -CT     Orientation Status (Cognition) oriented x 4  -CT     Follows Commands (Cognition) WFL  -CT     Row Name 23 1040          Transfers    Transfers sit-stand transfer;stand-sit transfer  -CT     Row Name 23 1040          Sit-Stand Transfer    Sit-Stand [place] (Transfers) contact guard  -CT     Assistive Device (Sit-Stand Transfers) walker, front-wheeled  -CT     Row Name 23 1040          Stand-Sit Transfer    Stand-Sit [place] (Transfers) contact guard  -CT     Assistive Device (Stand-Sit Transfers) walker, front-wheeled  -CT     Row Name 23 1040          Gait/Stairs (Locomotion)    [place] Level (Gait) contact guard  -CT     Assistive Device (Gait)  walker, front-wheeled  -CT     Distance in Feet (Gait) 50 x 2  -CT     Pattern (Gait) step-to  -CT     Deviations/Abnormal Patterns (Gait) stride length decreased;gait speed decreased;festinating/shuffling;weight shifting decreased  -CT     Bilateral Gait Deviations weight shift ability decreased;forward flexed posture  -CT     Row Name 01/12/23 1040          Motor Skills    Therapeutic Exercise --  BLE seated ther ex: seated marches, LAQ, AP, hip abd/add, glut sets; standing: standing marches, heel raises, mini squats. Pt educated on HEP.  -CT     Row Name 01/12/23 1040          Coping    Observed Emotional State calm;cooperative;pleasant  -CT     Verbalized Emotional State acceptance  -CT     Row Name 01/12/23 1040          Plan of Care Review    Plan of Care Reviewed With patient;son  -CT     Row Name 01/12/23 1040          Positioning and Restraints    Pre-Treatment Position sitting in chair/recliner  -CT     Post Treatment Position chair  -CT     In Chair sitting;call light within reach;encouraged to call for assist  -CT           User Key  (r) = Recorded By, (t) = Taken By, (c) = Cosigned By    Initials Name Provider Type    CT Bhargavi Vásquez PT Physical Therapist                Physical Therapy Education     Title: PT OT SLP Therapies (Done)     Topic: Physical Therapy (Done)     Point: Mobility training (Done)     Learning Progress Summary           Patient Acceptance, E,TB, VU by KK at 1/6/2023 1225    Acceptance, E, VU by CL at 1/5/2023 1414    Acceptance, E,TB, DU,VU by BD at 1/3/2023 0838    Acceptance, TB,E, VU,DU by BD at 1/2/2023 0907    Acceptance, TB,E, VU,DU by BD at 1/1/2023 0846    Acceptance, E,TB, VU by RM at 12/30/2022 1035    Acceptance, E,TB, VU by AC at 12/28/2022 1505    Acceptance, E,TB, VU by AC at 12/27/2022 1513    Acceptance, E, VU by CL at 12/26/2022 1325    Acceptance, E, VU by CL at 12/22/2022 1826    Acceptance, E, VU by CL at 12/21/2022 1303    Acceptance, E, VU by EB at  12/20/2022 1644    Acceptance, E, VU by CL at 12/17/2022 1703    Acceptance, E,D, VU,NR by LL at 12/16/2022 1605    Acceptance, E,TB, VU by AC at 12/16/2022 1523    Acceptance, E,TB, VU by AC at 12/15/2022 1618    Acceptance, E,TB, VU by AC at 12/14/2022 1503    Acceptance, E,TB, VU by DL at 12/13/2022 0240    Acceptance, D,E, VU,NR by LL at 12/12/2022 1455    Acceptance, E,TB, VU by RW at 12/9/2022 1451    Acceptance, E,TB, VU by BC at 11/27/2022 1319    Acceptance, E,TB, VU by RM at 11/20/2022 1449    Acceptance, E,TB, VU by RM at 11/18/2022 1021    Acceptance, E,TB, VU by RH at 11/16/2022 1110    Acceptance, E,TB, VU by KK at 11/14/2022 1638    Acceptance, E,TB, VU by KK at 11/13/2022 1648    Acceptance, E,TB, VU by KG at 11/10/2022 1827    Acceptance, E,TB, VU by DM at 11/9/2022 1638    Acceptance, E,TB, VU by KK at 11/8/2022 1754    Acceptance, E,TB, VU by KK at 11/7/2022 1635    Acceptance, E,D, VU,NR by LL at 11/5/2022 1325    Acceptance, E, VU by EB at 11/4/2022 1448    Acceptance, E,TB, VU by KM at 11/4/2022 1101   Family Acceptance, D,E, VU,NR by LL at 12/12/2022 1455                   Point: Home exercise program (Done)     Learning Progress Summary           Patient Acceptance, E,TB, VU by KK at 1/6/2023 1225    Acceptance, E, VU by CL at 1/5/2023 1414    Acceptance, E,TB, DU,VU by BD at 1/3/2023 0838    Acceptance, TB,E, VU,DU by BD at 1/2/2023 0907    Acceptance, TB,E, VU,DU by BD at 1/1/2023 0846    Acceptance, E,TB, VU by RM at 12/30/2022 1035    Acceptance, E,TB, VU by AC at 12/28/2022 1505    Acceptance, E,TB, VU by AC at 12/27/2022 1513    Acceptance, E, VU by CL at 12/26/2022 1325    Acceptance, E, VU by CL at 12/22/2022 1826    Acceptance, E, VU by CL at 12/21/2022 1303    Acceptance, E, VU by EB at 12/20/2022 1644    Acceptance, E, VU by CL at 12/17/2022 1703    Acceptance, E,D, VU,NR by LL at 12/16/2022 1605    Acceptance, E,TB, VU by AC at 12/16/2022 1523    Acceptance, E,TB, VU by AC at  12/15/2022 1618    Acceptance, E,TB, VU by AC at 12/14/2022 1503    Acceptance, E,TB, VU by DL at 12/13/2022 0240    Acceptance, D,E, VU,NR by LL at 12/12/2022 1455    Acceptance, E,TB, VU by RW at 12/9/2022 1451    Acceptance, E,TB, VU by BC at 11/27/2022 1319    Acceptance, E,TB, VU by RM at 11/20/2022 1449    Acceptance, E,TB, VU by RM at 11/18/2022 1021    Acceptance, E,TB, VU by RH at 11/16/2022 1110    Acceptance, E,TB, VU by KK at 11/14/2022 1638    Acceptance, E,TB, VU by KK at 11/13/2022 1648    Acceptance, E,TB, VU by KG at 11/10/2022 1827    Acceptance, E,TB, VU by DM at 11/9/2022 1638    Acceptance, E,TB, VU by KK at 11/8/2022 1754    Acceptance, E,TB, VU by KK at 11/7/2022 1635    Acceptance, E,D, VU,NR by LL at 11/5/2022 1325    Acceptance, E, VU by EB at 11/4/2022 1448    Acceptance, E,TB, VU by KM at 11/4/2022 1101   Family Acceptance, D,E, VU,NR by LL at 12/12/2022 1455                   Point: Body mechanics (Done)     Learning Progress Summary           Patient Acceptance, E,TB, VU by KK at 1/6/2023 1225    Acceptance, E, VU by CL at 1/5/2023 1414    Acceptance, E,TB, DU,VU by BD at 1/3/2023 0838    Acceptance, TB,E, VU,DU by BD at 1/2/2023 0907    Acceptance, TB,E, VU,DU by BD at 1/1/2023 0846    Acceptance, E,TB, VU by RM at 12/30/2022 1035    Acceptance, E,TB, VU by AC at 12/28/2022 1505    Acceptance, E,TB, VU by AC at 12/27/2022 1513    Acceptance, E, VU by CL at 12/26/2022 1325    Acceptance, E, VU by CL at 12/22/2022 1826    Acceptance, E, VU by CL at 12/21/2022 1303    Acceptance, E, VU by EB at 12/20/2022 1644    Acceptance, E, VU by CL at 12/17/2022 1703    Acceptance, E,D, VU,NR by LL at 12/16/2022 1605    Acceptance, E,TB, VU by AC at 12/16/2022 1523    Acceptance, E,TB, VU by AC at 12/15/2022 1618    Acceptance, E,TB, VU by AC at 12/14/2022 1503    Acceptance, E,TB, VU by DL at 12/13/2022 0240    Acceptance, D,E, VU,NR by LL at 12/12/2022 1455    Acceptance, E,TB, VU by RW at  12/9/2022 1451    Acceptance, E,TB, VU by BC at 11/27/2022 1319    Acceptance, E,TB, VU by RM at 11/20/2022 1449    Acceptance, E,TB, VU by RM at 11/18/2022 1021    Acceptance, E,TB, VU by RH at 11/16/2022 1110    Acceptance, E,TB, VU by KK at 11/14/2022 1638    Acceptance, E,TB, VU by KK at 11/13/2022 1648    Acceptance, E,TB, VU by KG at 11/10/2022 1827    Acceptance, E,TB, VU by DM at 11/9/2022 1638    Acceptance, E,TB, VU by KK at 11/8/2022 1754    Acceptance, E,TB, VU by KK at 11/7/2022 1635    Acceptance, E,D, VU,NR by LL at 11/5/2022 1325    Acceptance, E, VU by EB at 11/4/2022 1448    Acceptance, E,TB, VU by KM at 11/4/2022 1101   Family Acceptance, D,E, VU,NR by LL at 12/12/2022 1455                   Point: Precautions (Done)     Learning Progress Summary           Patient Acceptance, E,TB, VU by KK at 1/6/2023 1225    Acceptance, E, VU by CL at 1/5/2023 1414    Acceptance, E,TB, DU,VU by BD at 1/3/2023 0838    Acceptance, TB,E, VU,DU by BD at 1/2/2023 0907    Acceptance, TB,E, VU,DU by BD at 1/1/2023 0846    Acceptance, E,TB, VU by RM at 12/30/2022 1035    Acceptance, E,TB, VU by AC at 12/28/2022 1505    Acceptance, E,TB, VU by AC at 12/27/2022 1513    Acceptance, E, VU by CL at 12/26/2022 1325    Acceptance, E, VU by CL at 12/22/2022 1826    Acceptance, E, VU by CL at 12/21/2022 1303    Acceptance, E, VU by EB at 12/20/2022 1644    Acceptance, E, VU by CL at 12/17/2022 1703    Acceptance, E,D, VU,NR by LL at 12/16/2022 1605    Acceptance, E,TB, VU by AC at 12/16/2022 1523    Acceptance, E,TB, VU by AC at 12/15/2022 1618    Acceptance, E,TB, VU by AC at 12/14/2022 1503    Acceptance, E,TB, VU by DL at 12/13/2022 0240    Acceptance, D,E, VU,NR by LL at 12/12/2022 1455    Acceptance, E,TB, VU by RW at 12/9/2022 1451    Acceptance, E,TB, VU by BC at 11/27/2022 1319    Acceptance, E,TB, VU by RM at 11/20/2022 1449    Acceptance, E,TB, VU by RM at 11/18/2022 1021    Acceptance, E,TB, VU by RH at 11/16/2022  1110    Acceptance, E,TB, VU by KK at 11/14/2022 1638    Acceptance, E,TB, VU by KK at 11/13/2022 1648    Acceptance, E,TB, VU by KG at 11/10/2022 1827    Acceptance, E,TB, VU by DM at 11/9/2022 1638    Acceptance, E,TB, VU by KK at 11/8/2022 1754    Acceptance, E,TB, VU by KK at 11/7/2022 1635    Acceptance, E,D, VU,NR by LL at 11/5/2022 1325    Acceptance, E, VU by EB at 11/4/2022 1448    Acceptance, E,TB, VU by KM at 11/4/2022 1101   Family Acceptance, D,E, VU,NR by LL at 12/12/2022 1455                               User Key     Initials Effective Dates Name Provider Type Discipline    CL 06/16/21 -  Mitzy Mustafa, RN Registered Nurse Nurse    LL 05/02/16 -  Neda Garcia PTA Physical Therapist Assistant PT    RM 06/14/22 -  Bethany Krause, RN Registered Nurse Nurse    DM 06/16/21 -  Moniz, Deborah, RN Registered Nurse Nurse    BC 08/02/22 -  Collett, Brittany, RN Registered Nurse Nurse    AC 10/13/22 -  Jaya aCstillo, SONAM Registered Nurse Nurse    BD 08/05/21 -  Radha Lynn, RN Registered Nurse Nurse    KM 05/24/22 -  Mao Vázquez, PT Physical Therapist PT    EB 09/22/22 -  Cedric Oliver, RN Registered Nurse Nurse    KK 06/27/22 -  Chelsi Bolivar, RN Registered Nurse Nurse    DL 03/16/22 -  Francy Garcia, RN Registered Nurse Nurse    KG 09/22/22 -  Verónica Rodriguez, RN Registered Nurse Nurse    RW 09/22/22 -  Bethany Schofield, RN Registered Nurse Nurse    RH 08/03/22 - 12/21/22 Jung Russell, SONAM Extern Registered Nurse Nurse              PT Recommendation and Plan  Anticipated Discharge Disposition (PT): home with 24/7 care  Planned Therapy Interventions (PT): balance training, bed mobility training, gait training, home exercise program, manual therapy techniques, motor coordination training, neuromuscular re-education, patient/family education, postural re-education, strengthening, transfer training  Therapy Frequency (PT): 5 times/wk  Progress Summary (PT)  Progress Toward Functional Goals  (PT): progress toward functional goals is good, prepare for discharge  Plan of Care Reviewed With: patientpadmaja  Progress: improving   Outcome Measures     Row Name 01/12/23 1521 01/11/23 1500          How much help from another is currently needed...    Putting on and taking off regular lower body clothing? 3  -LM 3  -LM     Bathing (including washing, rinsing, and drying) 3  -LM 3  -LM     Toileting (which includes using toilet bed pan or urinal) 3  -LM 3  -LM     Putting on and taking off regular upper body clothing 3  -LM 3  -LM     Taking care of personal grooming (such as brushing teeth) 3  -LM 3  -LM     Eating meals 4  -LM 4  -LM     AM-PAC 6 Clicks Score (OT) 19  -LM 19  -LM        Functional Assessment    Outcome Measure Options AM-PAC 6 Clicks Daily Activity (OT)  -LM AM-PAC 6 Clicks Daily Activity (OT)  -LM           User Key  (r) = Recorded By, (t) = Taken By, (c) = Cosigned By    Initials Name Provider Type    LM Татьяна Bah, OT Occupational Therapist                 Time Calculation:    PT Charges     Row Name 01/12/23 1536             Time Calculation    Start Time 1040  -CT      Stop Time 1110  -CT      Time Calculation (min) 30 min  -CT      PT Received On 01/12/23  -CT            User Key  (r) = Recorded By, (t) = Taken By, (c) = Cosigned By    Initials Name Provider Type    CT Bhargavi Vásquez, PT Physical Therapist              Therapy Charges for Today     Code Description Service Date Service Provider Modifiers Qty    45537020769 HC GAIT TRAINING EA 15 MIN 1/11/2023 Bhargavi Vásquez, PT GP 1    79917321410 HC PT THERAPEUTIC ACT EA 15 MIN 1/11/2023 Bhargavi Vásquez, PT GP 1    30265984879 HC PT THER PROC EA 15 MIN 1/11/2023 Bhargavi Vásquez, PT GP 1    84746087904 HC GAIT TRAINING EA 15 MIN 1/12/2023 Bhargavi Vásquez, PT GP 1    68487546474 HC PT THER PROC EA 15 MIN 1/12/2023 Bhargavi Vásquez, PT GP 1          PT G-Codes  Outcome Measure Options: AM-PAC 6 Clicks Daily Activity (OT)  AM-PAC 6  Clicks Score (PT): 16  AM-PAC 6 Clicks Score (OT): 19    Bhargavi Vásquez, PT  2023      Electronically signed by Bhargavi Vásquez, PT at 23 1537          Occupational Therapy Notes (most recent note)      Татьяна Bah, OT at 23 1522          Acute Care - Occupational Therapy Treatment Note  [facility]     Patient Name: Tre Kinney  : 1932  MRN: [id number]  Today's Date: 2023  Onset of Illness/Injury or Date of Surgery: 10/06/22     Referring Physician: Janneth    Admit Date: 10/5/2022     No diagnosis found.  Patient Active Problem List   Diagnosis   • Acid reflux   • Ankle arthralgia   • Cardiac conduction disorder   • Closed fracture of distal fibula   • Arteriosclerosis of coronary artery   • Fracture of distal end of tibia   • Elevated cholesterol   • BP (high blood pressure)   • Hypertrophic polyarthritis   • OP (osteoporosis)   • Right heart failure (HCC)   • Heart failure, chronic, right-sided (HCC)   • SOB (shortness of breath)   • Palpitations   • CHF (congestive heart failure) (HCC)   • Congestive heart failure (HCC)   • Precordial pain   • Chronic anticoagulation   • Hypokalemia   • Anemia, unspecified type   • Generalized weakness     Past Medical History:   Diagnosis Date   • Anemia    • Arthritis    • CHF (congestive heart failure) (HCC)    • Chronic kidney disease    • Coronary artery disease    • H/O blood clots    • History of transfusion     no reaction    • Hypertension      Past Surgical History:   Procedure Laterality Date   • ABDOMINAL SURGERY     • APPENDECTOMY     • CARDIAC SURGERY     •  SECTION     • COLONOSCOPY N/A 3/7/2018    Procedure: COLONOSCOPY;  Surgeon: Willie Gresham MD;  Location:  [facility];  Service:    • CORONARY ANGIOPLASTY WITH STENT PLACEMENT     • ENDOSCOPY N/A 3/6/2018    Procedure: ESOPHAGOGASTRODUODENOSCOPY;  Surgeon: Willie Gresham MD;  Location:  [facility];  Service:    • PACEMAKER IMPLANTATION      left side    •  SKIN BIOPSY     • TUBAL ABDOMINAL LIGATION           OT ASSESSMENT FLOWSHEET (last 12 hours)     OT Evaluation and Treatment     Row Name 01/12/23 1516                   OT Time and Intention    Subjective Information no complaints  -LM        Document Type therapy note (daily note)  -LM        Mode of Treatment occupational therapy  -LM        Patient Effort good  -LM        Comment Patient seen this date for light bue arom/therex.  Patient performed light yellow tband and towel therex with frequent rest breaks.  Patient completed bue arom/therex sitting in bedside recliner.  Discussed adl education/recommendations with patient and son via facetime earlier in am.  recommend to perform BADL while seated with min/mod assist for safety.  Min/mod assist with toilet transfers and toileting with RW and min;/mod assist.  Patient and son verbalized understanding of adl recommendations and fxl mobility recommendations.  Patient states having bsc, hb, rw, w/c.  -LM           General Information    Existing Precautions/Restrictions fall  -LM           Cognition    Affect/Mental Status (Cognition) WFL  -LM        Orientation Status (Cognition) oriented x 4  -LM           Positioning and Restraints    Post Treatment Position chair  -LM        In Bed call light within reach;encouraged to call for assist  -LM              User Key  (r) = Recorded By, (t) = Taken By, (c) = Cosigned By    Initials Name Effective Dates    LM Татьяна Bah, OT 06/16/21 -                        OT Recommendation and Plan           Outcome Measures     Row Name 01/12/23 1521 01/11/23 1500          How much help from another is currently needed...    Putting on and taking off regular lower body clothing? 3  -LM 3  -LM     Bathing (including washing, rinsing, and drying) 3  -LM 3  -LM     Toileting (which includes using toilet bed pan or urinal) 3  -LM 3  -LM     Putting on and taking off regular upper body clothing 3  -LM 3  -LM     Taking care of  personal grooming (such as brushing teeth) 3  -LM 3  -LM     Eating meals 4  -LM 4  -LM     AM-PAC 6 Clicks Score (OT) 19  -LM 19  -LM        Functional Assessment    Outcome Measure Options AM-PAC 6 Clicks Daily Activity (OT)  -LM AM-PAC 6 Clicks Daily Activity (OT)  -LM           User Key  (r) = Recorded By, (t) = Taken By, (c) = Cosigned By    Initials Name Provider Type    LM Татьяна Bah, OT Occupational Therapist                Time Calculation:    Time Calculation- OT     Row Name 23 1522             Time Calculation- OT    Total Timed Code Minutes- OT 15 minute(s)  -LM            User Key  (r) = Recorded By, (t) = Taken By, (c) = Cosigned By    Initials Name Provider Type    LM Татьяна Bah, OT Occupational Therapist              Therapy Charges for Today     Code Description Service Date Service Provider Modifiers Qty    32257709254 HC OT THER PROC EA 15 MIN 2023 Татьяна Bah OT GO 1    18167925698 HC OT THER PROC EA 15 MIN 2023 Татьяна Bah OT GO 1               Татьяна Bah OT  2023    Electronically signed by Татьяна Bah OT at 23 1523          Speech Language Pathology Notes (most recent note)      Cathleen Hwang MA,CCC-SLP at 22 1506          Acute Care - Speech Language Pathology   [facility]   CLINICAL DYSPHAGIA ASSESSMENT     Patient Name: Tre Kinney  : 1932  MRN: [id number]  Today's Date: 2022  Onset of Illness/Injury or Date of Surgery: 10/06/22     Referring Physician: Janneth      Admit Date: 10/5/2022    Visit Dx:   No diagnosis found.  Patient Active Problem List   Diagnosis   • Acid reflux   • Ankle arthralgia   • Cardiac conduction disorder   • Closed fracture of distal fibula   • Arteriosclerosis of coronary artery   • Fracture of distal end of tibia   • Elevated cholesterol   • BP (high blood pressure)   • Hypertrophic polyarthritis   • OP (osteoporosis)   • Right heart failure (HCC)   •  Heart failure, chronic, right-sided (HCC)   • SOB (shortness of breath)   • Palpitations   • CHF (congestive heart failure) (HCC)   • Congestive heart failure (HCC)   • Precordial pain   • Chronic anticoagulation   • Hypokalemia   • Anemia, unspecified type   • Generalized weakness     Past Medical History:   Diagnosis Date   • Anemia    • Arthritis    • CHF (congestive heart failure) (HCC)    • Chronic kidney disease    • Coronary artery disease    • H/O blood clots    • History of transfusion     no reaction    • Hypertension      Past Surgical History:   Procedure Laterality Date   • ABDOMINAL SURGERY     • APPENDECTOMY     • CARDIAC SURGERY     •  SECTION     • COLONOSCOPY N/A 3/7/2018    Procedure: COLONOSCOPY;  Surgeon: Willie Gresham MD;  Location:  [facility];  Service:    • CORONARY ANGIOPLASTY WITH STENT PLACEMENT     • ENDOSCOPY N/A 3/6/2018    Procedure: ESOPHAGOGASTRODUODENOSCOPY;  Surgeon: Willie Gresham MD;  Location:  [facility];  Service:    • PACEMAKER IMPLANTATION      left side    • SKIN BIOPSY     • TUBAL ABDOMINAL LIGATION       Tre Kinney  is seen at bedside this pm on 3N to assess safety/efficacy of swallowing fnx, determine safest/least restrictive diet tolerance.     Ms. Kinney participated in Clinical Dysphagia Assessment 22 w/ wfl oropharyngeal swallow. She did request mechanical soft diet w/ chopped meats modification per personal preference at that time. She is now admitted to swing bed, improved in overall status and requesting diet upgrade.     Social History     Socioeconomic History   • Marital status:    Tobacco Use   • Smoking status: Never   Substance and Sexual Activity   • Alcohol use: No   • Drug use: No   • Sexual activity: Defer      No recent chest xray available for review.     Diet Orders (active) (From admission, onward)     Start     Ordered    22 1912  Diet: Texture: Soft to Chew (NDD 3); Soft to Chew: Chopped Meat; Fluid  Consistency: Regular Consistency; Regular/House Diet  Diet Effective Now         12/05/22 1557    11/15/22 1200  DIET MESSAGE Dieatry supplement- magic cup  Daily With Lunch & Dinner      Comments: Dieatry supplement- magic cup    11/15/22 0257    10/06/22 1511  DIET MESSAGE Please send soup beans and cornbread on supper tray please this evening.  Once        Comments: Please send soup beans and cornbread on supper tray please this evening.    10/06/22 1511              She is observed on ra w/o complications.     Pt is positioned upright and centered in a chair at bedside to accept multiple po presentations of ice chips, solid cracker, and thin liquids via spoon, cup and straw.  She is able to self feed.     Facial/oral structures are symmetrical upon observation. Lingual protrusion reveals no deviation. Oral mucosa are moist, pink, and clean. Secretions are clear, thin, and well controlled. OROM/REE is wfl to imitate oral postures. Gag is not assessed. Volitional cough is intact w/ adequate  intensity, clear in quality, non-productive. Voice is adequate in intensity, clear in quality w/ intelligible speech. She is oriented to person, place and time, follows directives and participates in conversational exchanges.     Upon po presentations, adequate bolus anticipation and acceptance w/ good labial seal for bolus clearance via spoon bowl, cup rim stability and suction via straw. Bolus formation, manipulation and control are wfl w/ rotary mastication pattern. A-p transit is timely w/o oral residue. No overt s/s aspiration evidenced before the swallow.     Pharyngeal swallow is timely w/ adequate hyolaryngeal elevation per palpation. No overt s/s aspiration evidenced across this assessment. No silent aspiration suspected as pt is w/o changes in vocal quality, respirations or secretions post po presentations. Pt denies odynophagia.    Impression: Per this assessment, Ms. Kinney presents w/ wfl oropharyngeal swallow  w/o s/s aspiration. No s/s indicative of silent aspiration. No odynophagia reported.      She is felt to most benefit from upgrade to least restrictive regular consistency, thin liquids.     SLP Recommendation and Plan  1. Regular consistency, thin liquids.    2. Meds whole in puree/thins.   3. Upright and centered for all po intake.  4. KARLO precautions.  5. Oral care protocol.  No further formal SLP f/u warranted at this time.    D/w pt results and recommendations w/ verbal agreement.    Thank you for allowing me to participate in the care of your patient-  Cathleen Hwang M.A., CCC-SLP    EDUCATION  The patient has been educated in the following areas:   Dysphagia (Swallowing Impairment).     Time Calculation:    Time Calculation- SLP     Row Name 12/06/22 1506             Untimed Charges    02620-YS Eval Oral Pharyng Swallow Minutes 38  -CJ         Total Minutes    Untimed Charges Total Minutes 38  -CJ       Total Minutes 38  -CJ            User Key  (r) = Recorded By, (t) = Taken By, (c) = Cosigned By    Initials Name Provider Type     Cathleen Hwang MA,CCC-SLP Speech and Language Pathologist                Therapy Charges for Today     Code Description Service Date Service Provider Modifiers Qty    86330924548 HC ST EVAL ORAL PHARYNG SWALLOW 3 12/6/2022 Cathleen Hwang MA,CCC-SLP GN 1        Cathleen Hwang MA,CCC-SLP  12/6/2022    Electronically signed by Cathleen Hwang MA,CCC-SLP at 12/06/22 1516       ADL Documentation (most recent)    Flowsheet Row Most Recent Value   Transferring 3 - assistive equipment and person   Toileting 3 - assistive equipment and person   Bathing 3 - assistive equipment and person   Dressing 2 - assistive person   Eating 0 - independent   Communication 0 - understands/communicates without difficulty   Swallowing 0 - swallows foods/liquids without difficulty   Equipment Currently Used at Home wheelchair, commode, walker, rolling, oxygen         Discharge Summary    No notes of this type exist for this encounter.       Discharge Order (From admission, onward)     Start     Ordered    01/13/23 0840  Discharge patient  Once        Expected Discharge Date: 01/13/23    Expected Discharge Time: Morning    Discharge Disposition: Home-Health Care [facility]    Physician of Record for Attribution - Please select from Treatment Team: IMTIAZ CUEVAS [[id number]]    Review needed by CMO to determine Physician of Record: No       Question Answer Comment   Physician of Record for Attribution - Please select from Treatment Team IMTIAZ CUEVAS    Review needed by CMO to determine Physician of Record No        01/13/23 0849

## 2023-01-14 ENCOUNTER — HOSPITAL ENCOUNTER (EMERGENCY)
Facility: HOSPITAL | Age: 88
Discharge: HOME | End: 2023-01-14
Attending: STUDENT IN AN ORGANIZED HEALTH CARE EDUCATION/TRAINING PROGRAM | Admitting: STUDENT IN AN ORGANIZED HEALTH CARE EDUCATION/TRAINING PROGRAM
Payer: MEDICARE

## 2023-01-14 VITALS
SYSTOLIC BLOOD PRESSURE: 109 MMHG | OXYGEN SATURATION: 96 % | RESPIRATION RATE: 18 BRPM | BODY MASS INDEX: 25.9 KG/M2 | TEMPERATURE: 98.2 F | HEART RATE: 86 BPM | WEIGHT: 165 LBS | HEIGHT: 67 IN | DIASTOLIC BLOOD PRESSURE: 70 MMHG

## 2023-01-14 VITALS
BODY MASS INDEX: 25.9 KG/M2 | RESPIRATION RATE: 18 BRPM | OXYGEN SATURATION: 96 % | WEIGHT: 165 LBS | SYSTOLIC BLOOD PRESSURE: 145 MMHG | TEMPERATURE: 98.3 F | HEIGHT: 67 IN | DIASTOLIC BLOOD PRESSURE: 69 MMHG | HEART RATE: 85 BPM

## 2023-01-14 DIAGNOSIS — Z00.00 WELL ADULT HEALTH CHECK: Primary | ICD-10-CM

## 2023-01-14 LAB
QT INTERVAL: 466 MS
QTC INTERVAL: 557 MS

## 2023-01-14 PROCEDURE — 99283 EMERGENCY DEPT VISIT LOW MDM: CPT

## 2023-01-14 NOTE — ED PROVIDER NOTES
Subjective   History of Present Illness  This is a 89 year old female patient who presents to the ER with need for NH placement. She presents with her son. She recently had a very long inpatient hospital admission to our facility. She was then in our ER last night for a fall and had extensive imaging and work up and no acute abnormalities were found. Today, she and her son present here today as instructed by a nurse at Health system who told them that she can be admitted to the hospital for the weekend and go to her NH placement on Monday. The patient is asymptomatic.         Review of Systems   Constitutional: Negative.  Negative for fever.   HENT: Negative.    Respiratory: Negative.    Cardiovascular: Negative.  Negative for chest pain.   Gastrointestinal: Negative.  Negative for abdominal pain.   Endocrine: Negative.    Genitourinary: Negative.  Negative for dysuria.   Skin: Negative.    Neurological: Negative.    Psychiatric/Behavioral: Negative.    All other systems reviewed and are negative.      Past Medical History:   Diagnosis Date   • Anemia    • Arthritis    • CHF (congestive heart failure) (HCC)    • Chronic kidney disease    • Coronary artery disease    • H/O blood clots    • History of transfusion     no reaction    • Hypertension        Allergies   Allergen Reactions   • Adhesive Tape Dermatitis   • Xanax [Alprazolam] Unknown - Low Severity       Past Surgical History:   Procedure Laterality Date   • ABDOMINAL SURGERY     • APPENDECTOMY     • CARDIAC SURGERY     •  SECTION     • COLONOSCOPY N/A 3/7/2018    Procedure: COLONOSCOPY;  Surgeon: Willie Gresham MD;  Location:  [facility];  Service:    • CORONARY ANGIOPLASTY WITH STENT PLACEMENT     • ENDOSCOPY N/A 3/6/2018    Procedure: ESOPHAGOGASTRODUODENOSCOPY;  Surgeon: Willie Gresham MD;  Location:  [facility];  Service:    • PACEMAKER IMPLANTATION      left side    • SKIN BIOPSY     • TUBAL ABDOMINAL LIGATION         Family History    Problem Relation Age of Onset   • Other Mother         cardiac disorder   • Hypertension Mother    • Heart disease Mother    • Thyroid disease Mother    • Other Father         cardiac disorder   • Heart disease Father    • Hypertension Father    • Other Sister         cardiac disorder   • Other Brother         cardiac disorder   • Other Other         cardiac disorder   • Other Other         cardiac disorder       Social History     Socioeconomic History   • Marital status:    Tobacco Use   • Smoking status: Never   Substance and Sexual Activity   • Alcohol use: No   • Drug use: No   • Sexual activity: Defer           Objective   Physical Exam  Vitals and nursing note reviewed.   Constitutional:       General: She is not in acute distress.     Appearance: She is well-developed. She is not diaphoretic.   HENT:      Head: Normocephalic and atraumatic.      Right Ear: External ear normal.      Left Ear: External ear normal.      Nose: Nose normal.   Eyes:      Conjunctiva/sclera: Conjunctivae normal.      Pupils: Pupils are equal, round, and reactive to light.   Neck:      Vascular: No JVD.      Trachea: No tracheal deviation.   Cardiovascular:      Rate and Rhythm: Normal rate and regular rhythm.      Heart sounds: Normal heart sounds. No murmur heard.  Pulmonary:      Effort: Pulmonary effort is normal. No respiratory distress.      Breath sounds: Normal breath sounds. No wheezing.   Abdominal:      General: Bowel sounds are normal.      Palpations: Abdomen is soft.      Tenderness: There is no abdominal tenderness.   Musculoskeletal:         General: No deformity. Normal range of motion.      Cervical back: Normal range of motion and neck supple.   Skin:     General: Skin is warm and dry.      Coloration: Skin is not pale.      Findings: No erythema or rash.   Neurological:      Mental Status: She is alert and oriented to person, place, and time.      Cranial Nerves: No cranial nerve deficit.   Psychiatric:          Behavior: Behavior normal.         Thought Content: Thought content normal.         Procedures           ED Course  ED Course as of 01/14/23 1158   Sat Jan 14, 2023   1119 Ronald, house supervisor, came in and explained to the patient and her son that we have no reason to admit the patient based on her normal work up last night. He explained that we don't admit patients to our facility for the sole purpose of awaiting NH placement. He has asked her to call and taxi to get her home. We will do so.  [MM]   1142 Spoke with [facility] who is willing to take her back home. They will send a truck shortly.  [MM]   1156 Son came out and said that he got them a ride home and wants to cancel EMS.  [MM]      ED Course User Index  [MM] Anna Morley PA                                           Medical Decision Making    This is a 89 year old female patient who presents to the ER with need for NH placement. She presents with her son. She recently had a very long inpatient hospital admission to our facility. She was then in our ER last night for a fall and had extensive imaging and work up and no acute abnormalities were found. Today, she and her son present here today as instructed by a nurse at [facility] who told them that she can be admitted to the hospital for the weekend and go to her NH placement on Monday. The patient is asymptomatic.         Well adult health check: acute illness or injury      Final diagnoses:   Well adult health check       ED Disposition  ED Disposition     ED Disposition   Discharge    Condition   Stable    Comment   --             Jessica Pedro, APRN  [address] [address]  [phone]    In 2 days           Medication List      No changes were made to your prescriptions during this visit.          Anna Morley PA  01/14/23 1123       Anna Morley PA  01/14/23 1142       Anna Morley PA  01/14/23 1157       Anna Morley PA  01/14/23 1158
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
## 2023-01-14 NOTE — OUTREACH NOTE
Prep Survey    Flowsheet Row Responses   [facility] facility patient discharged from? [employer] [facility]   Is LACE score < 7 ? No   Eligibility Readm Mgmt   Discharge diagnosis Contusion of pelvic region due to fall   Does the patient have one of the following disease processes/diagnoses(primary or secondary)? Other   Does the patient have Home health ordered? No   Is there a DME ordered? No   Prep survey completed? Yes          RADHA KWAN - Registered Nurse

## 2023-01-14 NOTE — ED NOTES
[facility] has been called and they are going have to get approval before taking her home due to home situation.

## 2023-01-14 NOTE — ED NOTES
[facility]ed [facility] and canceled ride. Pt son said to cancel it that they have a ride on the way.

## 2023-01-14 NOTE — ED NOTES
Left message on answering machine for Jessi Gillespie, Social  Worker for [facility] for follow up in patient's home after discharge.
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
## 2023-01-14 NOTE — ED NOTES
Attempted to contact patient son, Willian Kinney, to obtain transportation home and ensure caregiver at home for patient. Patient's nurse advises that she has contacted patient's two other sons, Valdez and Leno, to advise of patient's discharge and plans to return to home.
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
## 2023-01-17 ENCOUNTER — READMISSION MANAGEMENT (OUTPATIENT)
Dept: CALL CENTER | Facility: HOSPITAL | Age: 88
End: 2023-01-17
Payer: MEDICARE

## 2023-01-17 NOTE — OUTREACH NOTE
Medical Week 1 Survey    Flowsheet Row Responses   [facility] facility patient discharged from? [facility]   Does the patient have one of the following disease processes/diagnoses(primary or secondary)? Other   Week 1 attempt successful? No   Unsuccessful attempts Attempt 1          SALVATORE HINOJOSA - Registered Nurse

## 2023-01-19 ENCOUNTER — READMISSION MANAGEMENT (OUTPATIENT)
Dept: CALL CENTER | Facility: HOSPITAL | Age: 88
End: 2023-01-19
Payer: MEDICARE
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
## 2023-01-27 ENCOUNTER — READMISSION MANAGEMENT (OUTPATIENT)
Dept: CALL CENTER | Facility: HOSPITAL | Age: 88
End: 2023-01-27
Payer: MEDICARE

## 2023-01-27 NOTE — OUTREACH NOTE
Medical Week 2 Survey    Flowsheet Row Responses   [facility] patient discharged from? [facility]   Does the patient have one of the following disease processes/diagnoses(primary or secondary)? Other   Week 2 attempt successful? No   Unsuccessful attempts Attempt 1          SALVATORE HINOJOSA - Registered Nurse

## 2023-01-31 ENCOUNTER — READMISSION MANAGEMENT (OUTPATIENT)
Dept: CALL CENTER | Facility: HOSPITAL | Age: 88
End: 2023-01-31
Payer: MEDICARE

## 2023-01-31 NOTE — OUTREACH NOTE
Medical Week 2 Survey    Flowsheet Row Responses   [facility] patient discharged from? [facility]   Does the patient have one of the following disease processes/diagnoses(primary or secondary)? Other   Week 2 attempt successful? Yes   Call start time 1123   Revoke Change in health status-moved to LTC/SNF/Hospice  [SON STATES PATIENT IS GETTING ADMITTED TO [facility] THIS THURSDAY FOR LONG TERM CARE. ]          BRIJESH BALDWIN - Licensed Nurse

## 2023-03-09 ENCOUNTER — LAB REQUISITION (OUTPATIENT)
Dept: LAB | Facility: HOSPITAL | Age: 88
End: 2023-03-09
Payer: MEDICARE

## 2023-03-09 DIAGNOSIS — I10 ESSENTIAL (PRIMARY) HYPERTENSION: ICD-10-CM

## 2023-03-09 LAB
ANION GAP SERPL CALCULATED.3IONS-SCNC: 9.1 MMOL/L (ref 5–15)
BUN SERPL-MCNC: 31 MG/DL (ref 8–23)
BUN/CREAT SERPL: 15.1 (ref 7–25)
CALCIUM SPEC-SCNC: 9.1 MG/DL (ref 8.2–9.6)
CHLORIDE SERPL-SCNC: 102 MMOL/L (ref 98–107)
CO2 SERPL-SCNC: 30.9 MMOL/L (ref 22–29)
CREAT SERPL-MCNC: 2.05 MG/DL (ref 0.57–1)
EGFRCR SERPLBLD CKD-EPI 2021: 22.7 ML/MIN/1.73
GLUCOSE SERPL-MCNC: 94 MG/DL (ref 65–99)
POTASSIUM SERPL-SCNC: 4 MMOL/L (ref 3.5–5.2)
SODIUM SERPL-SCNC: 142 MMOL/L (ref 136–145)

## 2023-03-09 PROCEDURE — 80048 BASIC METABOLIC PNL TOTAL CA: CPT | Performed by: INTERNAL MEDICINE

## 2023-03-24 ENCOUNTER — LAB REQUISITION (OUTPATIENT)
Dept: LAB | Facility: HOSPITAL | Age: 88
End: 2023-03-24
Payer: MEDICARE

## 2023-03-24 DIAGNOSIS — R30.0 DYSURIA: ICD-10-CM

## 2023-03-24 LAB
BACTERIA UR QL AUTO: ABNORMAL /HPF
BILIRUB UR QL STRIP: NEGATIVE
CLARITY UR: CLEAR
COLOR UR: YELLOW
GLUCOSE UR STRIP-MCNC: NEGATIVE MG/DL
HGB UR QL STRIP.AUTO: ABNORMAL
HYALINE CASTS UR QL AUTO: ABNORMAL /LPF
KETONES UR QL STRIP: NEGATIVE
LEUKOCYTE ESTERASE UR QL STRIP.AUTO: ABNORMAL
NITRITE UR QL STRIP: POSITIVE
PH UR STRIP.AUTO: 5.5 [PH] (ref 5–8)
PROT UR QL STRIP: NEGATIVE
RBC # UR STRIP: ABNORMAL /HPF
REF LAB TEST METHOD: ABNORMAL
SP GR UR STRIP: 1.01 (ref 1–1.03)
SQUAMOUS #/AREA URNS HPF: ABNORMAL /HPF
UROBILINOGEN UR QL STRIP: ABNORMAL
WBC # UR STRIP: ABNORMAL /HPF

## 2023-03-24 PROCEDURE — 87088 URINE BACTERIA CULTURE: CPT | Performed by: INTERNAL MEDICINE

## 2023-03-24 PROCEDURE — 87086 URINE CULTURE/COLONY COUNT: CPT | Performed by: INTERNAL MEDICINE

## 2023-03-24 PROCEDURE — 81001 URINALYSIS AUTO W/SCOPE: CPT | Performed by: INTERNAL MEDICINE

## 2023-03-24 PROCEDURE — 87186 SC STD MICRODIL/AGAR DIL: CPT | Performed by: INTERNAL MEDICINE

## 2023-03-26 LAB — BACTERIA SPEC AEROBE CULT: ABNORMAL

## 2023-03-31 NOTE — PROGRESS NOTES
Discharge Planning Assessment  CODY Ellison     Patient Name: Tre Kinney  MRN: [id number]  Today's Date: 11/17/2017    Admit Date: 11/8/2017          Discharge Needs Assessment     None            Discharge Plan       11/17/17 0844    Final Note    Final Note Pt to be discharged home on this date.   No further intervention needed.        Discharge Placement     No information found        Expected Discharge Date and Time     Expected Discharge Date Expected Discharge Time    Nov 17, 2017               Demographic Summary     None            Functional Status     None            Psychosocial     None            Abuse/Neglect     None            Legal     None            Substance Abuse     None            Patient Forms     None          Mary Salgado     Palliative care encounter

## 2023-04-03 ENCOUNTER — LAB REQUISITION (OUTPATIENT)
Dept: LAB | Facility: HOSPITAL | Age: 88
End: 2023-04-03
Payer: MEDICARE

## 2023-04-03 DIAGNOSIS — Z20.828 CONTACT WITH AND (SUSPECTED) EXPOSURE TO OTHER VIRAL COMMUNICABLE DISEASES: ICD-10-CM

## 2023-04-03 LAB — SARS-COV-2 RNA RESP QL NAA+PROBE: DETECTED

## 2023-04-03 PROCEDURE — U0003 INFECTIOUS AGENT DETECTION BY NUCLEIC ACID (DNA OR RNA); SEVERE ACUTE RESPIRATORY SYNDROME CORONAVIRUS 2 (SARS-COV-2) (CORONAVIRUS DISEASE [COVID-19]), AMPLIFIED PROBE TECHNIQUE, MAKING USE OF HIGH THROUGHPUT TECHNOLOGIES AS DESCRIBED BY CMS-2020-01-R: HCPCS | Performed by: INTERNAL MEDICINE

## 2023-05-14 NOTE — PLAN OF CARE
Problem: Adult Inpatient Plan of Care  Goal: Plan of Care Review  Outcome: Ongoing, Progressing  Goal: Patient-Specific Goal (Individualized)  Outcome: Ongoing, Progressing  Goal: Absence of Hospital-Acquired Illness or Injury  Outcome: Ongoing, Progressing  Intervention: Identify and Manage Fall Risk  Recent Flowsheet Documentation  Taken 5/13/2023 2000 by Radha Goyal RN  Safety Promotion/Fall Prevention:   activity supervised   assistive device/personal items within reach   clutter free environment maintained   fall prevention program maintained   safety round/check completed  Intervention: Prevent Skin Injury  Recent Flowsheet Documentation  Taken 5/13/2023 2000 by Radha Goyal RN  Body Position: weight shifting  Intervention: Prevent and Manage VTE (Venous Thromboembolism) Risk  Recent Flowsheet Documentation  Taken 5/13/2023 2000 by Radha Goyal RN  Activity Management: activity encouraged  Goal: Optimal Comfort and Wellbeing  Outcome: Ongoing, Progressing  Intervention: Provide Person-Centered Care  Recent Flowsheet Documentation  Taken 5/13/2023 2000 by Radha Goyal RN  Trust Relationship/Rapport:   care explained   choices provided   emotional support provided   empathic listening provided   questions answered   questions encouraged   reassurance provided   thoughts/feelings acknowledged  Goal: Readiness for Transition of Care  Outcome: Ongoing, Progressing     Problem: Fall Injury Risk  Goal: Absence of Fall and Fall-Related Injury  Outcome: Ongoing, Progressing  Intervention: Identify and Manage Contributors  Recent Flowsheet Documentation  Taken 5/13/2023 2000 by Radha Goyal RN  Medication Review/Management: medications reviewed  Intervention: Promote Injury-Free Environment  Recent Flowsheet Documentation  Taken 5/13/2023 2000 by Radha Goyal RN  Safety Promotion/Fall Prevention:   activity supervised   assistive device/personal items within reach   clutter free environment  "Goal Outcome Evaluation:              Outcome Evaluation: Pt resting in bed. Pt has been cooperative and quiet this shift. Noted that pt typically will not ask for anything unless prompted; pt says she doesn't \"want to be a bother.\" Pt has only voided once so far this shift. When asked, pt said she did feel like her bladder was full but had not tried to urinate because she did not want to get her bed wet; pt reminded to use call light when needing to urinate so staff can assist. Will round on pt more frequently. Pt appears to not have gotten much sleep this shift after night time dose of melatonin; pt states she does typically have a hard time sleeping. No other acute changes noted. Will continue POC  " maintained   fall prevention program maintained   safety round/check completed     Problem: Skin Injury Risk Increased  Goal: Skin Health and Integrity  Outcome: Ongoing, Progressing  Intervention: Optimize Skin Protection  Recent Flowsheet Documentation  Taken 5/13/2023 2000 by Radha Goyal, RN  Head of Bed (HOB) Positioning: HOB elevated   Goal Outcome Evaluation:

## 2023-07-26 ENCOUNTER — LAB REQUISITION (OUTPATIENT)
Dept: LAB | Facility: HOSPITAL | Age: 88
End: 2023-07-26
Payer: MEDICARE

## 2023-07-26 DIAGNOSIS — I50.40 UNSPECIFIED COMBINED SYSTOLIC (CONGESTIVE) AND DIASTOLIC (CONGESTIVE) HEART FAILURE: ICD-10-CM

## 2023-07-26 LAB
ANION GAP SERPL CALCULATED.3IONS-SCNC: 9.8 MMOL/L (ref 5–15)
BUN SERPL-MCNC: 40 MG/DL (ref 8–23)
BUN/CREAT SERPL: 18.7 (ref 7–25)
CALCIUM SPEC-SCNC: 8.8 MG/DL (ref 8.2–9.6)
CHLORIDE SERPL-SCNC: 104 MMOL/L (ref 98–107)
CO2 SERPL-SCNC: 25.2 MMOL/L (ref 22–29)
CREAT SERPL-MCNC: 2.14 MG/DL (ref 0.57–1)
EGFRCR SERPLBLD CKD-EPI 2021: 21.5 ML/MIN/1.73
GLUCOSE SERPL-MCNC: 94 MG/DL (ref 65–99)
NT-PROBNP SERPL-MCNC: ABNORMAL PG/ML (ref 0–1800)
POTASSIUM SERPL-SCNC: 4.8 MMOL/L (ref 3.5–5.2)
SODIUM SERPL-SCNC: 139 MMOL/L (ref 136–145)

## 2023-07-26 PROCEDURE — 80048 BASIC METABOLIC PNL TOTAL CA: CPT | Performed by: INTERNAL MEDICINE

## 2023-07-26 PROCEDURE — 83880 ASSAY OF NATRIURETIC PEPTIDE: CPT | Performed by: INTERNAL MEDICINE

## 2023-08-21 ENCOUNTER — LAB REQUISITION (OUTPATIENT)
Dept: LAB | Facility: HOSPITAL | Age: 88
End: 2023-08-21
Payer: MEDICARE

## 2023-08-21 DIAGNOSIS — N39.0 URINARY TRACT INFECTION, SITE NOT SPECIFIED: ICD-10-CM

## 2023-08-21 LAB
BILIRUB UR QL STRIP: NEGATIVE
CLARITY UR: CLEAR
COLOR UR: YELLOW
GLUCOSE UR STRIP-MCNC: NEGATIVE MG/DL
HGB UR QL STRIP.AUTO: NEGATIVE
KETONES UR QL STRIP: NEGATIVE
LEUKOCYTE ESTERASE UR QL STRIP.AUTO: NEGATIVE
NITRITE UR QL STRIP: NEGATIVE
PH UR STRIP.AUTO: 6 [PH] (ref 5–8)
PROT UR QL STRIP: NEGATIVE
SP GR UR STRIP: 1.01 (ref 1–1.03)
UROBILINOGEN UR QL STRIP: NORMAL

## 2023-08-21 PROCEDURE — 81003 URINALYSIS AUTO W/O SCOPE: CPT | Performed by: INTERNAL MEDICINE

## 2023-08-25 ENCOUNTER — LAB REQUISITION (OUTPATIENT)
Dept: LAB | Facility: HOSPITAL | Age: 88
End: 2023-08-25
Payer: MEDICARE

## 2023-08-25 DIAGNOSIS — I12.9 HYPERTENSIVE CHRONIC KIDNEY DISEASE WITH STAGE 1 THROUGH STAGE 4 CHRONIC KIDNEY DISEASE, OR UNSPECIFIED CHRONIC KIDNEY DISEASE: ICD-10-CM

## 2023-08-25 LAB
ANION GAP SERPL CALCULATED.3IONS-SCNC: 11.5 MMOL/L (ref 5–15)
BASOPHILS # BLD AUTO: 0.05 10*3/MM3 (ref 0–0.2)
BASOPHILS NFR BLD AUTO: 1.1 % (ref 0–1.5)
BUN SERPL-MCNC: 34 MG/DL (ref 8–23)
BUN/CREAT SERPL: 15.8 (ref 7–25)
CALCIUM SPEC-SCNC: 8.7 MG/DL (ref 8.2–9.6)
CHLORIDE SERPL-SCNC: 104 MMOL/L (ref 98–107)
CO2 SERPL-SCNC: 25.5 MMOL/L (ref 22–29)
CREAT SERPL-MCNC: 2.15 MG/DL (ref 0.57–1)
DEPRECATED RDW RBC AUTO: 51.7 FL (ref 37–54)
EGFRCR SERPLBLD CKD-EPI 2021: 21.4 ML/MIN/1.73
EOSINOPHIL # BLD AUTO: 0.22 10*3/MM3 (ref 0–0.4)
EOSINOPHIL NFR BLD AUTO: 5 % (ref 0.3–6.2)
ERYTHROCYTE [DISTWIDTH] IN BLOOD BY AUTOMATED COUNT: 15.9 % (ref 12.3–15.4)
GLUCOSE SERPL-MCNC: 116 MG/DL (ref 65–99)
HCT VFR BLD AUTO: 32.1 % (ref 34–46.6)
HGB BLD-MCNC: 9.3 G/DL (ref 12–15.9)
IMM GRANULOCYTES # BLD AUTO: 0.01 10*3/MM3 (ref 0–0.05)
IMM GRANULOCYTES NFR BLD AUTO: 0.2 % (ref 0–0.5)
INR PPP: 2.74 (ref 0.9–1.1)
LYMPHOCYTES # BLD AUTO: 0.84 10*3/MM3 (ref 0.7–3.1)
LYMPHOCYTES NFR BLD AUTO: 19.2 % (ref 19.6–45.3)
MCH RBC QN AUTO: 25.8 PG (ref 26.6–33)
MCHC RBC AUTO-ENTMCNC: 29 G/DL (ref 31.5–35.7)
MCV RBC AUTO: 89.2 FL (ref 79–97)
MONOCYTES # BLD AUTO: 0.42 10*3/MM3 (ref 0.1–0.9)
MONOCYTES NFR BLD AUTO: 9.6 % (ref 5–12)
NEUTROPHILS NFR BLD AUTO: 2.84 10*3/MM3 (ref 1.7–7)
NEUTROPHILS NFR BLD AUTO: 64.9 % (ref 42.7–76)
NRBC BLD AUTO-RTO: 0 /100 WBC (ref 0–0.2)
NT-PROBNP SERPL-MCNC: ABNORMAL PG/ML (ref 0–1800)
PLATELET # BLD AUTO: 215 10*3/MM3 (ref 140–450)
PMV BLD AUTO: 10.8 FL (ref 6–12)
POTASSIUM SERPL-SCNC: 4.9 MMOL/L (ref 3.5–5.2)
PROTHROMBIN TIME: 29.8 SECONDS (ref 12.1–14.7)
RBC # BLD AUTO: 3.6 10*6/MM3 (ref 3.77–5.28)
SODIUM SERPL-SCNC: 141 MMOL/L (ref 136–145)
WBC NRBC COR # BLD: 4.38 10*3/MM3 (ref 3.4–10.8)

## 2023-08-25 PROCEDURE — 85610 PROTHROMBIN TIME: CPT | Performed by: INTERNAL MEDICINE

## 2023-08-25 PROCEDURE — 80048 BASIC METABOLIC PNL TOTAL CA: CPT | Performed by: INTERNAL MEDICINE

## 2023-08-25 PROCEDURE — 83880 ASSAY OF NATRIURETIC PEPTIDE: CPT | Performed by: INTERNAL MEDICINE

## 2023-08-25 PROCEDURE — 85025 COMPLETE CBC W/AUTO DIFF WBC: CPT | Performed by: INTERNAL MEDICINE

## 2023-09-15 ENCOUNTER — LAB REQUISITION (OUTPATIENT)
Dept: LAB | Facility: HOSPITAL | Age: 88
End: 2023-09-15
Payer: MEDICARE

## 2023-09-15 DIAGNOSIS — I10 ESSENTIAL (PRIMARY) HYPERTENSION: ICD-10-CM

## 2023-09-15 LAB
ANION GAP SERPL CALCULATED.3IONS-SCNC: 12 MMOL/L (ref 5–15)
BASOPHILS # BLD AUTO: 0.04 10*3/MM3 (ref 0–0.2)
BASOPHILS NFR BLD AUTO: 0.6 % (ref 0–1.5)
BUN SERPL-MCNC: 44 MG/DL (ref 8–23)
BUN/CREAT SERPL: 18.6 (ref 7–25)
CALCIUM SPEC-SCNC: 8.4 MG/DL (ref 8.2–9.6)
CHLORIDE SERPL-SCNC: 100 MMOL/L (ref 98–107)
CO2 SERPL-SCNC: 23 MMOL/L (ref 22–29)
CREAT SERPL-MCNC: 2.37 MG/DL (ref 0.57–1)
DEPRECATED RDW RBC AUTO: 51.4 FL (ref 37–54)
EGFRCR SERPLBLD CKD-EPI 2021: 19 ML/MIN/1.73
EOSINOPHIL # BLD AUTO: 0.28 10*3/MM3 (ref 0–0.4)
EOSINOPHIL NFR BLD AUTO: 4.2 % (ref 0.3–6.2)
ERYTHROCYTE [DISTWIDTH] IN BLOOD BY AUTOMATED COUNT: 16.4 % (ref 12.3–15.4)
GLUCOSE SERPL-MCNC: 117 MG/DL (ref 65–99)
HCT VFR BLD AUTO: 33.6 % (ref 34–46.6)
HGB BLD-MCNC: 9.8 G/DL (ref 12–15.9)
IMM GRANULOCYTES # BLD AUTO: 0.02 10*3/MM3 (ref 0–0.05)
IMM GRANULOCYTES NFR BLD AUTO: 0.3 % (ref 0–0.5)
LYMPHOCYTES # BLD AUTO: 0.85 10*3/MM3 (ref 0.7–3.1)
LYMPHOCYTES NFR BLD AUTO: 12.8 % (ref 19.6–45.3)
MCH RBC QN AUTO: 25.3 PG (ref 26.6–33)
MCHC RBC AUTO-ENTMCNC: 29.2 G/DL (ref 31.5–35.7)
MCV RBC AUTO: 86.6 FL (ref 79–97)
MONOCYTES # BLD AUTO: 0.5 10*3/MM3 (ref 0.1–0.9)
MONOCYTES NFR BLD AUTO: 7.5 % (ref 5–12)
NEUTROPHILS NFR BLD AUTO: 4.95 10*3/MM3 (ref 1.7–7)
NEUTROPHILS NFR BLD AUTO: 74.6 % (ref 42.7–76)
NRBC BLD AUTO-RTO: 0 /100 WBC (ref 0–0.2)
NT-PROBNP SERPL-MCNC: ABNORMAL PG/ML (ref 0–1800)
PLATELET # BLD AUTO: 188 10*3/MM3 (ref 140–450)
PMV BLD AUTO: 11.2 FL (ref 6–12)
POTASSIUM SERPL-SCNC: 4.8 MMOL/L (ref 3.5–5.2)
RBC # BLD AUTO: 3.88 10*6/MM3 (ref 3.77–5.28)
SODIUM SERPL-SCNC: 135 MMOL/L (ref 136–145)
WBC NRBC COR # BLD: 6.64 10*3/MM3 (ref 3.4–10.8)

## 2023-09-15 PROCEDURE — 83880 ASSAY OF NATRIURETIC PEPTIDE: CPT | Performed by: NURSE PRACTITIONER

## 2023-09-15 PROCEDURE — 85025 COMPLETE CBC W/AUTO DIFF WBC: CPT | Performed by: NURSE PRACTITIONER

## 2023-09-15 PROCEDURE — 80048 BASIC METABOLIC PNL TOTAL CA: CPT | Performed by: NURSE PRACTITIONER

## 2023-10-29 ENCOUNTER — HOSPITAL ENCOUNTER (EMERGENCY)
Facility: HOSPITAL | Age: 88
Discharge: HOME | End: 2023-10-29
Attending: STUDENT IN AN ORGANIZED HEALTH CARE EDUCATION/TRAINING PROGRAM | Admitting: STUDENT IN AN ORGANIZED HEALTH CARE EDUCATION/TRAINING PROGRAM
Payer: MEDICARE

## 2023-10-29 ENCOUNTER — APPOINTMENT (OUTPATIENT)
Dept: NUCLEAR MEDICINE | Facility: HOSPITAL | Age: 88
End: 2023-10-29
Payer: MEDICARE

## 2023-10-29 ENCOUNTER — APPOINTMENT (OUTPATIENT)
Dept: GENERAL RADIOLOGY | Facility: HOSPITAL | Age: 88
End: 2023-10-29
Payer: MEDICARE

## 2023-10-29 VITALS
BODY MASS INDEX: 22.76 KG/M2 | HEIGHT: 67 IN | RESPIRATION RATE: 17 BRPM | DIASTOLIC BLOOD PRESSURE: 76 MMHG | OXYGEN SATURATION: 94 % | TEMPERATURE: 98.5 F | HEART RATE: 80 BPM | SYSTOLIC BLOOD PRESSURE: 131 MMHG | WEIGHT: 145 LBS

## 2023-10-29 DIAGNOSIS — I50.9 ACUTE ON CHRONIC CONGESTIVE HEART FAILURE, UNSPECIFIED HEART FAILURE TYPE: Primary | ICD-10-CM

## 2023-10-29 LAB
ALBUMIN SERPL-MCNC: 3.6 G/DL (ref 3.5–5.2)
ALBUMIN/GLOB SERPL: 1.2 G/DL
ALP SERPL-CCNC: 62 U/L (ref 39–117)
ALT SERPL W P-5'-P-CCNC: 8 U/L (ref 1–33)
ANION GAP SERPL CALCULATED.3IONS-SCNC: 14.5 MMOL/L (ref 5–15)
AST SERPL-CCNC: 14 U/L (ref 1–32)
BASOPHILS # BLD AUTO: 0.07 10*3/MM3 (ref 0–0.2)
BASOPHILS NFR BLD AUTO: 1.1 % (ref 0–1.5)
BILIRUB SERPL-MCNC: 0.3 MG/DL (ref 0–1.2)
BUN SERPL-MCNC: 45 MG/DL (ref 8–23)
BUN/CREAT SERPL: 19.8 (ref 7–25)
CALCIUM SPEC-SCNC: 8.6 MG/DL (ref 8.2–9.6)
CHLORIDE SERPL-SCNC: 103 MMOL/L (ref 98–107)
CO2 SERPL-SCNC: 20.5 MMOL/L (ref 22–29)
CREAT SERPL-MCNC: 2.27 MG/DL (ref 0.57–1)
DEPRECATED RDW RBC AUTO: 61.6 FL (ref 37–54)
EGFRCR SERPLBLD CKD-EPI 2021: 20.1 ML/MIN/1.73
EOSINOPHIL # BLD AUTO: 0.31 10*3/MM3 (ref 0–0.4)
EOSINOPHIL NFR BLD AUTO: 5.1 % (ref 0.3–6.2)
ERYTHROCYTE [DISTWIDTH] IN BLOOD BY AUTOMATED COUNT: 19.1 % (ref 12.3–15.4)
GLOBULIN UR ELPH-MCNC: 2.9 GM/DL
GLUCOSE SERPL-MCNC: 106 MG/DL (ref 65–99)
HCT VFR BLD AUTO: 31.9 % (ref 34–46.6)
HGB BLD-MCNC: 9.3 G/DL (ref 12–15.9)
IMM GRANULOCYTES # BLD AUTO: 0.01 10*3/MM3 (ref 0–0.05)
IMM GRANULOCYTES NFR BLD AUTO: 0.2 % (ref 0–0.5)
INR PPP: 2.25 (ref 0.9–1.1)
LYMPHOCYTES # BLD AUTO: 1.05 10*3/MM3 (ref 0.7–3.1)
LYMPHOCYTES NFR BLD AUTO: 17.2 % (ref 19.6–45.3)
MCH RBC QN AUTO: 25.7 PG (ref 26.6–33)
MCHC RBC AUTO-ENTMCNC: 29.2 G/DL (ref 31.5–35.7)
MCV RBC AUTO: 88.1 FL (ref 79–97)
MONOCYTES # BLD AUTO: 0.52 10*3/MM3 (ref 0.1–0.9)
MONOCYTES NFR BLD AUTO: 8.5 % (ref 5–12)
NEUTROPHILS NFR BLD AUTO: 4.14 10*3/MM3 (ref 1.7–7)
NEUTROPHILS NFR BLD AUTO: 67.9 % (ref 42.7–76)
NRBC BLD AUTO-RTO: 0 /100 WBC (ref 0–0.2)
NT-PROBNP SERPL-MCNC: ABNORMAL PG/ML (ref 0–1800)
PLATELET # BLD AUTO: 241 10*3/MM3 (ref 140–450)
PMV BLD AUTO: 10.8 FL (ref 6–12)
POTASSIUM SERPL-SCNC: 4.7 MMOL/L (ref 3.5–5.2)
PROT SERPL-MCNC: 6.5 G/DL (ref 6–8.5)
PROTHROMBIN TIME: 25.6 SECONDS (ref 12.1–14.7)
RBC # BLD AUTO: 3.62 10*6/MM3 (ref 3.77–5.28)
SODIUM SERPL-SCNC: 138 MMOL/L (ref 136–145)
TROPONIN T SERPL HS-MCNC: 38 NG/L
WBC NRBC COR # BLD: 6.1 10*3/MM3 (ref 3.4–10.8)

## 2023-10-29 PROCEDURE — 93005 ELECTROCARDIOGRAM TRACING: CPT | Performed by: STUDENT IN AN ORGANIZED HEALTH CARE EDUCATION/TRAINING PROGRAM

## 2023-10-29 PROCEDURE — 83880 ASSAY OF NATRIURETIC PEPTIDE: CPT | Performed by: STUDENT IN AN ORGANIZED HEALTH CARE EDUCATION/TRAINING PROGRAM

## 2023-10-29 PROCEDURE — 71045 X-RAY EXAM CHEST 1 VIEW: CPT | Performed by: RADIOLOGY

## 2023-10-29 PROCEDURE — 93010 ELECTROCARDIOGRAM REPORT: CPT | Performed by: INTERNAL MEDICINE

## 2023-10-29 PROCEDURE — 78580 LUNG PERFUSION IMAGING: CPT | Performed by: RADIOLOGY

## 2023-10-29 PROCEDURE — 36415 COLL VENOUS BLD VENIPUNCTURE: CPT

## 2023-10-29 PROCEDURE — 85025 COMPLETE CBC W/AUTO DIFF WBC: CPT | Performed by: STUDENT IN AN ORGANIZED HEALTH CARE EDUCATION/TRAINING PROGRAM

## 2023-10-29 PROCEDURE — 96374 THER/PROPH/DIAG INJ IV PUSH: CPT

## 2023-10-29 PROCEDURE — 78580 LUNG PERFUSION IMAGING: CPT

## 2023-10-29 PROCEDURE — 0 TECHNETIUM ALBUMIN AGGREGATED: Performed by: STUDENT IN AN ORGANIZED HEALTH CARE EDUCATION/TRAINING PROGRAM

## 2023-10-29 PROCEDURE — 25010000002 FUROSEMIDE PER 20 MG: Performed by: STUDENT IN AN ORGANIZED HEALTH CARE EDUCATION/TRAINING PROGRAM

## 2023-10-29 PROCEDURE — 85610 PROTHROMBIN TIME: CPT | Performed by: STUDENT IN AN ORGANIZED HEALTH CARE EDUCATION/TRAINING PROGRAM

## 2023-10-29 PROCEDURE — 99285 EMERGENCY DEPT VISIT HI MDM: CPT

## 2023-10-29 PROCEDURE — 80053 COMPREHEN METABOLIC PANEL: CPT | Performed by: STUDENT IN AN ORGANIZED HEALTH CARE EDUCATION/TRAINING PROGRAM

## 2023-10-29 PROCEDURE — 71045 X-RAY EXAM CHEST 1 VIEW: CPT

## 2023-10-29 PROCEDURE — 84484 ASSAY OF TROPONIN QUANT: CPT | Performed by: STUDENT IN AN ORGANIZED HEALTH CARE EDUCATION/TRAINING PROGRAM

## 2023-10-29 PROCEDURE — A9540 TC99M MAA: HCPCS | Performed by: STUDENT IN AN ORGANIZED HEALTH CARE EDUCATION/TRAINING PROGRAM

## 2023-10-29 RX ORDER — FUROSEMIDE 10 MG/ML
60 INJECTION INTRAMUSCULAR; INTRAVENOUS ONCE
Status: COMPLETED | OUTPATIENT
Start: 2023-10-29 | End: 2023-10-29

## 2023-10-29 RX ORDER — SODIUM CHLORIDE 0.9 % (FLUSH) 0.9 %
10 SYRINGE (ML) INJECTION AS NEEDED
Status: DISCONTINUED | OUTPATIENT
Start: 2023-10-29 | End: 2023-10-29 | Stop reason: HOSPADM

## 2023-10-29 RX ADMIN — KIT FOR THE PREPARATION OF TECHNETIUM TC 99M ALBUMIN AGGREGATED 1 DOSE: 2.5 INJECTION, POWDER, FOR SOLUTION INTRAVENOUS at 13:40

## 2023-10-29 RX ADMIN — FUROSEMIDE 60 MG: 10 INJECTION, SOLUTION INTRAMUSCULAR; INTRAVENOUS at 11:57

## 2023-10-29 NOTE — DISCHARGE INSTRUCTIONS
You were evaluated in the emergency department for your swelling and increased shortness of breath.  We did multiple tests to look at your heart as well as check for blood clots in your lungs.  You do not have any blood clots in your lungs.  Your symptoms are likely due to your heart failure acting up.  Recommend that you increase your Lasix over the next couple days and follow-up with your regular doctor and/your cardiologist.

## 2023-10-29 NOTE — ED PROVIDER NOTES
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  Reports progressive symptoms over the last several days.  No fevers, cough, nausea, vomiting, diarrhea, abdominal pain.    Per documentation from nursing home patient has had wheezing and congestion due to the increased shortness of breath and swelling.    Patient has been taking her Lasix, per documentation Georgie has been given some extra Lasix.  Patient states that she has been urinating but this has seemed to have dropped off over the last day or so.    Review of Systems    Past Medical History:   Diagnosis Date    Anemia     Arthritis     CHF (congestive heart failure)     Chronic kidney disease     Coronary artery disease     H/O blood clots     History of transfusion     no reaction     Hypertension        Allergies   Allergen Reactions    Adhesive Tape Dermatitis    Xanax [Alprazolam] Unknown - Low Severity       Past Surgical History:   Procedure Laterality Date    ABDOMINAL SURGERY      APPENDECTOMY      CARDIAC SURGERY       SECTION      COLONOSCOPY N/A 3/7/2018    Procedure: COLONOSCOPY;  Surgeon: Willie Gresham MD;  Location:  [facility];  Service:     CORONARY ANGIOPLASTY WITH STENT PLACEMENT      ENDOSCOPY N/A 3/6/2018    Procedure: ESOPHAGOGASTRODUODENOSCOPY;  Surgeon: Willie Gresham MD;  Location:  [facility];  Service:     PACEMAKER IMPLANTATION      left side     SKIN BIOPSY      TUBAL ABDOMINAL LIGATION         Family History   Problem Relation Age of Onset    Other Mother         cardiac disorder    Hypertension Mother     Heart disease Mother     Thyroid disease Mother     Other Father         cardiac disorder    Heart disease Father     Hypertension Father     Other Sister         cardiac disorder    Other Brother          cardiac disorder    Other Other         cardiac disorder    Other Other         cardiac disorder       Social History     Socioeconomic History    Marital status:    Tobacco Use    Smoking status: Never   Substance and Sexual Activity    Alcohol use: No    Drug use: No    Sexual activity: Defer           Objective   Physical Exam  Vitals and nursing note reviewed.   Constitutional:       General: She is not in acute distress.     Appearance: She is not toxic-appearing.      Comments: Hard of hearing   HENT:      Head: Normocephalic.      Mouth/Throat:      Mouth: Mucous membranes are moist.   Cardiovascular:      Rate and Rhythm: Normal rate and regular rhythm.      Pulses: Normal pulses.   Pulmonary:      Effort: Pulmonary effort is normal.      Breath sounds: Normal breath sounds.   Abdominal:      General: Abdomen is flat. There is no distension.      Palpations: Abdomen is soft.      Tenderness: There is no abdominal tenderness.   Musculoskeletal:      Cervical back: Normal range of motion and neck supple.      Comments: Bilateral lower extremity edema, pitting edema to right lower extremity to level of Knee pitting edema to left lower extremity to lower quarter of shin and changes to nonpitting edema to level of knee, circumference of lower extremities appears overall symmetric   Skin:     General: Skin is warm and dry.   Neurological:      Mental Status: She is alert and oriented to person, place, and time.         Procedures           ED Course  ED Course as of 10/29/23 1554   Sun Oct 29, 2023   1051   EKG ED Interpretation by: Katarina Alcazar MD on 10/29/23 at 10:39  EKG: HR 80, unchanged from previous tracings, ventricularly paced.     Electronically signed by Katarina Alcazar MD, 10/29/23, 10:51 AM EDT.   [KH]   1137 INR(!): 2.25  INR at goal [KH]   1137 Creatinine(!): 2.27  Creatinine at baseline [KH]   1137 proBNP(!): 19,387.0  BNP elevated compared to priors, previous appears to be around 13,000.   Will give dose of Lasix IV [KH]   1138 HS Troponin T(!): 38  Similar to priors [KH]   1138 Hemoglobin(!): 9.3  Similar to prior [KH]   1314 Spoke with radiology, to have ability to VQ scan wreaking.  VQ scan ordered.  Patient is diuresing with IV Lasix.  We will continue to monitor respiratory status and urine output. [KH]   1550 NM Lung Scan Perfusion Particulate  Negative for PE [KH]   1550 Symptoms is likely due to CHF exacerbation.  Patient not hypoxic.  Appropriate urine output after dose of IV Lasix in the ED.  Patient to discharge back to long-term care facility. [KH]      ED Course User Index  [KH] Katarina Alcazar MD                                           Medical Decision Making  Problems Addressed:  Acute on chronic congestive heart failure, unspecified heart failure type: complicated acute illness or injury    Amount and/or Complexity of Data Reviewed  Labs: ordered. Decision-making details documented in ED Course.  Radiology: ordered. Decision-making details documented in ED Course.  ECG/medicine tests: ordered.    Risk  Prescription drug management.      Review of records: Echo 9/20/2022 EF 51 to 55%, undetermined diastolic function, mild concentric hypertrophy of left ventricle, moderately reduced right ventricular systolic function, moderate tricuspid regurg, severe pulmonary hypertension    DVT ultrasound 10/12/2022 with diffuse DVT of left lower extremity, repeat ultrasound on 12/8/22 showed continued presence of left DVT in left superficial femoral vein.         Final diagnoses:   Acute on chronic congestive heart failure, unspecified heart failure type       ED Disposition  ED Disposition       ED Disposition   Discharge    Condition   Stable    Comment   --               Benito Navarro MD  [address] [id number] [phone]               Medication List      No changes were made to your prescriptions during this visit.            Katarina Alcazar MD  10/29/23 2883

## 2023-10-29 NOTE — ED NOTES
House contacted for our ems truck to return pt to nursing home. Advised she would have them come get pt.

## 2023-10-30 LAB
QT INTERVAL: 452 MS
QTC INTERVAL: 521 MS

## 2023-11-10 ENCOUNTER — LAB REQUISITION (OUTPATIENT)
Dept: LAB | Facility: HOSPITAL | Age: 88
End: 2023-11-10
Payer: MEDICARE

## 2023-11-10 ENCOUNTER — HOSPITAL ENCOUNTER (INPATIENT)
Facility: HOSPITAL | Age: 88
LOS: 3 days | Discharge: NURSING, REHAB OR LONG-TERM CARE | DRG: 281 | End: 2023-11-13
Attending: EMERGENCY MEDICINE | Admitting: HOSPITALIST
Payer: MEDICAID

## 2023-11-10 ENCOUNTER — APPOINTMENT (OUTPATIENT)
Dept: GENERAL RADIOLOGY | Facility: HOSPITAL | Age: 88
DRG: 281 | End: 2023-11-10
Payer: MEDICARE

## 2023-11-10 DIAGNOSIS — E11.311 TYPE 2 DIABETES MELLITUS WITH UNSPECIFIED DIABETIC RETINOPATHY WITH MACULAR EDEMA: ICD-10-CM

## 2023-11-10 DIAGNOSIS — I50.9 ACUTE ON CHRONIC CONGESTIVE HEART FAILURE, UNSPECIFIED HEART FAILURE TYPE: Primary | ICD-10-CM

## 2023-11-10 PROBLEM — I50.43 ACUTE ON CHRONIC COMBINED SYSTOLIC AND DIASTOLIC CHF (CONGESTIVE HEART FAILURE): Status: ACTIVE | Noted: 2023-11-10

## 2023-11-10 LAB
ALBUMIN SERPL-MCNC: 3.6 G/DL (ref 3.5–5.2)
ALBUMIN/GLOB SERPL: 1.4 G/DL
ALP SERPL-CCNC: 68 U/L (ref 39–117)
ALT SERPL W P-5'-P-CCNC: 8 U/L (ref 1–33)
ANION GAP SERPL CALCULATED.3IONS-SCNC: 11.2 MMOL/L (ref 5–15)
AST SERPL-CCNC: 12 U/L (ref 1–32)
BACTERIA UR QL AUTO: ABNORMAL /HPF
BASOPHILS # BLD AUTO: 0.05 10*3/MM3 (ref 0–0.2)
BASOPHILS NFR BLD AUTO: 0.9 % (ref 0–1.5)
BILIRUB SERPL-MCNC: 0.4 MG/DL (ref 0–1.2)
BILIRUB UR QL STRIP: NEGATIVE
BUN SERPL-MCNC: 41 MG/DL (ref 8–23)
BUN/CREAT SERPL: 16.6 (ref 7–25)
CALCIUM SPEC-SCNC: 8.7 MG/DL (ref 8.2–9.6)
CHLORIDE SERPL-SCNC: 105 MMOL/L (ref 98–107)
CLARITY UR: CLEAR
CO2 SERPL-SCNC: 24.8 MMOL/L (ref 22–29)
COLOR UR: YELLOW
CREAT SERPL-MCNC: 2.47 MG/DL (ref 0.57–1)
CRP SERPL-MCNC: <0.3 MG/DL (ref 0–0.5)
DEPRECATED RDW RBC AUTO: 57.4 FL (ref 37–54)
EGFRCR SERPLBLD CKD-EPI 2021: 18.1 ML/MIN/1.73
EOSINOPHIL # BLD AUTO: 0.2 10*3/MM3 (ref 0–0.4)
EOSINOPHIL NFR BLD AUTO: 3.8 % (ref 0.3–6.2)
ERYTHROCYTE [DISTWIDTH] IN BLOOD BY AUTOMATED COUNT: 18.5 % (ref 12.3–15.4)
ERYTHROCYTE [SEDIMENTATION RATE] IN BLOOD: 13 MM/HR (ref 0–30)
GLOBULIN UR ELPH-MCNC: 2.6 GM/DL
GLUCOSE SERPL-MCNC: 158 MG/DL (ref 65–99)
GLUCOSE UR STRIP-MCNC: NEGATIVE MG/DL
HBA1C MFR BLD: 5.5 % (ref 4.8–5.6)
HCT VFR BLD AUTO: 29.9 % (ref 34–46.6)
HGB BLD-MCNC: 8.8 G/DL (ref 12–15.9)
HGB UR QL STRIP.AUTO: ABNORMAL
HYALINE CASTS UR QL AUTO: ABNORMAL /LPF
IMM GRANULOCYTES # BLD AUTO: 0.01 10*3/MM3 (ref 0–0.05)
IMM GRANULOCYTES NFR BLD AUTO: 0.2 % (ref 0–0.5)
INR PPP: 2.1 (ref 0.9–1.1)
KETONES UR QL STRIP: NEGATIVE
LEUKOCYTE ESTERASE UR QL STRIP.AUTO: NEGATIVE
LYMPHOCYTES # BLD AUTO: 0.92 10*3/MM3 (ref 0.7–3.1)
LYMPHOCYTES NFR BLD AUTO: 17.4 % (ref 19.6–45.3)
MCH RBC QN AUTO: 25.1 PG (ref 26.6–33)
MCHC RBC AUTO-ENTMCNC: 29.4 G/DL (ref 31.5–35.7)
MCV RBC AUTO: 85.4 FL (ref 79–97)
MONOCYTES # BLD AUTO: 0.51 10*3/MM3 (ref 0.1–0.9)
MONOCYTES NFR BLD AUTO: 9.6 % (ref 5–12)
NEUTROPHILS NFR BLD AUTO: 3.61 10*3/MM3 (ref 1.7–7)
NEUTROPHILS NFR BLD AUTO: 68.1 % (ref 42.7–76)
NITRITE UR QL STRIP: NEGATIVE
NRBC BLD AUTO-RTO: 0 /100 WBC (ref 0–0.2)
NT-PROBNP SERPL-MCNC: ABNORMAL PG/ML (ref 0–1800)
NT-PROBNP SERPL-MCNC: ABNORMAL PG/ML (ref 0–1800)
PH UR STRIP.AUTO: 7 [PH] (ref 5–8)
PLATELET # BLD AUTO: 212 10*3/MM3 (ref 140–450)
PMV BLD AUTO: 9.7 FL (ref 6–12)
POTASSIUM SERPL-SCNC: 4.9 MMOL/L (ref 3.5–5.2)
PROT SERPL-MCNC: 6.2 G/DL (ref 6–8.5)
PROT UR QL STRIP: NEGATIVE
PROTHROMBIN TIME: 24.3 SECONDS (ref 12.1–14.7)
RBC # BLD AUTO: 3.5 10*6/MM3 (ref 3.77–5.28)
RBC # UR STRIP: ABNORMAL /HPF
REF LAB TEST METHOD: ABNORMAL
SODIUM SERPL-SCNC: 141 MMOL/L (ref 136–145)
SP GR UR STRIP: 1.01 (ref 1–1.03)
SQUAMOUS #/AREA URNS HPF: ABNORMAL /HPF
TROPONIN T SERPL HS-MCNC: 42 NG/L
TROPONIN T SERPL HS-MCNC: 44 NG/L
UROBILINOGEN UR QL STRIP: ABNORMAL
WBC # UR STRIP: ABNORMAL /HPF
WBC NRBC COR # BLD: 5.3 10*3/MM3 (ref 3.4–10.8)

## 2023-11-10 PROCEDURE — 25010000002 FUROSEMIDE PER 20 MG: Performed by: STUDENT IN AN ORGANIZED HEALTH CARE EDUCATION/TRAINING PROGRAM

## 2023-11-10 PROCEDURE — 86140 C-REACTIVE PROTEIN: CPT | Performed by: EMERGENCY MEDICINE

## 2023-11-10 PROCEDURE — 71045 X-RAY EXAM CHEST 1 VIEW: CPT | Performed by: RADIOLOGY

## 2023-11-10 PROCEDURE — 71045 X-RAY EXAM CHEST 1 VIEW: CPT

## 2023-11-10 PROCEDURE — 83880 ASSAY OF NATRIURETIC PEPTIDE: CPT | Performed by: INTERNAL MEDICINE

## 2023-11-10 PROCEDURE — 85652 RBC SED RATE AUTOMATED: CPT | Performed by: EMERGENCY MEDICINE

## 2023-11-10 PROCEDURE — 36415 COLL VENOUS BLD VENIPUNCTURE: CPT

## 2023-11-10 PROCEDURE — 99285 EMERGENCY DEPT VISIT HI MDM: CPT

## 2023-11-10 PROCEDURE — 81001 URINALYSIS AUTO W/SCOPE: CPT | Performed by: EMERGENCY MEDICINE

## 2023-11-10 PROCEDURE — 80053 COMPREHEN METABOLIC PANEL: CPT | Performed by: EMERGENCY MEDICINE

## 2023-11-10 PROCEDURE — 93005 ELECTROCARDIOGRAM TRACING: CPT | Performed by: EMERGENCY MEDICINE

## 2023-11-10 PROCEDURE — 85610 PROTHROMBIN TIME: CPT | Performed by: STUDENT IN AN ORGANIZED HEALTH CARE EDUCATION/TRAINING PROGRAM

## 2023-11-10 PROCEDURE — 85025 COMPLETE CBC W/AUTO DIFF WBC: CPT | Performed by: EMERGENCY MEDICINE

## 2023-11-10 PROCEDURE — 99223 1ST HOSP IP/OBS HIGH 75: CPT

## 2023-11-10 PROCEDURE — 83036 HEMOGLOBIN GLYCOSYLATED A1C: CPT | Performed by: HOSPITALIST

## 2023-11-10 PROCEDURE — 93010 ELECTROCARDIOGRAM REPORT: CPT | Performed by: INTERNAL MEDICINE

## 2023-11-10 PROCEDURE — 84484 ASSAY OF TROPONIN QUANT: CPT | Performed by: STUDENT IN AN ORGANIZED HEALTH CARE EDUCATION/TRAINING PROGRAM

## 2023-11-10 PROCEDURE — 83880 ASSAY OF NATRIURETIC PEPTIDE: CPT | Performed by: EMERGENCY MEDICINE

## 2023-11-10 RX ORDER — ASPIRIN 325 MG
325 TABLET ORAL ONCE
Status: COMPLETED | OUTPATIENT
Start: 2023-11-11 | End: 2023-11-11

## 2023-11-10 RX ORDER — ONDANSETRON 4 MG/1
4 TABLET, FILM COATED ORAL EVERY 12 HOURS PRN
COMMUNITY

## 2023-11-10 RX ORDER — WARFARIN SODIUM 4 MG/1
4 TABLET ORAL
COMMUNITY

## 2023-11-10 RX ORDER — SACUBITRIL AND VALSARTAN 49; 51 MG/1; MG/1
1 TABLET, FILM COATED ORAL 2 TIMES DAILY
Status: ON HOLD | COMMUNITY
End: 2023-11-11

## 2023-11-10 RX ORDER — WARFARIN SODIUM 1 MG/1
0.5 TABLET ORAL
COMMUNITY

## 2023-11-10 RX ORDER — BISACODYL 10 MG
10 SUPPOSITORY, RECTAL RECTAL DAILY PRN
Status: DISCONTINUED | OUTPATIENT
Start: 2023-11-10 | End: 2023-11-13 | Stop reason: HOSPADM

## 2023-11-10 RX ORDER — ALBUTEROL SULFATE 90 UG/1
2 AEROSOL, METERED RESPIRATORY (INHALATION) EVERY 6 HOURS PRN
COMMUNITY

## 2023-11-10 RX ORDER — AMOXICILLIN 250 MG
2 CAPSULE ORAL 2 TIMES DAILY
Status: DISCONTINUED | OUTPATIENT
Start: 2023-11-11 | End: 2023-11-13 | Stop reason: HOSPADM

## 2023-11-10 RX ORDER — POLYETHYLENE GLYCOL 3350 17 G/17G
17 POWDER, FOR SOLUTION ORAL DAILY PRN
Status: DISCONTINUED | OUTPATIENT
Start: 2023-11-10 | End: 2023-11-13 | Stop reason: HOSPADM

## 2023-11-10 RX ORDER — BISACODYL 5 MG/1
5 TABLET, DELAYED RELEASE ORAL DAILY PRN
Status: DISCONTINUED | OUTPATIENT
Start: 2023-11-10 | End: 2023-11-13 | Stop reason: HOSPADM

## 2023-11-10 RX ORDER — SODIUM CHLORIDE 0.9 % (FLUSH) 0.9 %
10 SYRINGE (ML) INJECTION AS NEEDED
Status: DISCONTINUED | OUTPATIENT
Start: 2023-11-10 | End: 2023-11-13 | Stop reason: HOSPADM

## 2023-11-10 RX ORDER — SODIUM CHLORIDE 0.9 % (FLUSH) 0.9 %
10 SYRINGE (ML) INJECTION EVERY 12 HOURS SCHEDULED
Status: DISCONTINUED | OUTPATIENT
Start: 2023-11-11 | End: 2023-11-13 | Stop reason: HOSPADM

## 2023-11-10 RX ORDER — POTASSIUM CHLORIDE 750 MG/1
10 CAPSULE, EXTENDED RELEASE ORAL DAILY
COMMUNITY

## 2023-11-10 RX ORDER — NITROGLYCERIN 0.4 MG/1
0.4 TABLET SUBLINGUAL
Status: DISCONTINUED | OUTPATIENT
Start: 2023-11-10 | End: 2023-11-13 | Stop reason: HOSPADM

## 2023-11-10 RX ORDER — FUROSEMIDE 10 MG/ML
60 INJECTION INTRAMUSCULAR; INTRAVENOUS ONCE
Status: COMPLETED | OUTPATIENT
Start: 2023-11-10 | End: 2023-11-10

## 2023-11-10 RX ORDER — FUROSEMIDE 10 MG/ML
40 INJECTION INTRAMUSCULAR; INTRAVENOUS DAILY
Status: DISCONTINUED | OUTPATIENT
Start: 2023-11-11 | End: 2023-11-13 | Stop reason: HOSPADM

## 2023-11-10 RX ORDER — SODIUM CHLORIDE 9 MG/ML
40 INJECTION, SOLUTION INTRAVENOUS AS NEEDED
Status: DISCONTINUED | OUTPATIENT
Start: 2023-11-10 | End: 2023-11-13 | Stop reason: HOSPADM

## 2023-11-10 RX ORDER — HEPARIN SODIUM 5000 [USP'U]/ML
5000 INJECTION, SOLUTION INTRAVENOUS; SUBCUTANEOUS EVERY 12 HOURS SCHEDULED
Status: DISCONTINUED | OUTPATIENT
Start: 2023-11-11 | End: 2023-11-10

## 2023-11-10 RX ORDER — ASPIRIN 81 MG/1
81 TABLET, CHEWABLE ORAL DAILY
Status: DISCONTINUED | OUTPATIENT
Start: 2023-11-11 | End: 2023-11-13 | Stop reason: HOSPADM

## 2023-11-10 RX ORDER — FUROSEMIDE 20 MG/1
60 TABLET ORAL DAILY
COMMUNITY
End: 2023-11-13 | Stop reason: HOSPADM

## 2023-11-10 RX ADMIN — FUROSEMIDE 60 MG: 10 INJECTION, SOLUTION INTRAMUSCULAR; INTRAVENOUS at 21:36

## 2023-11-10 NOTE — ED PROVIDER NOTES
Subjective     History provided by:  Patient   used: No    Weakness - Generalized  Severity:  Mild  Onset quality:  Gradual  Timing:  Constant  Progression:  Unchanged  Chronicity:  Chronic  Context: not alcohol use, not allergies, not change in medication, not decreased sleep, not dehydration, not drug use, not increased activity, not pinched nerve, not recent infection, not stress and not urinary tract infection    Relieved by:  Nothing  Worsened by:  Nothing  Ineffective treatments:  None tried  Associated symptoms: difficulty walking    Associated symptoms: no abdominal pain, no anorexia, no aphasia, no arthralgias, no ataxia, no chest pain, no cough, no diarrhea, no dizziness, no drooling, no dysphagia, no dysuria, no numbness in extremities, no falls, no fever, no foul-smelling urine, no frequency, no headaches, no hematochezia, no lethargy, no loss of consciousness, no melena, no myalgias, no nausea, no near-syncope, no seizures, no sensory-motor deficit, no shortness of breath, no stroke symptoms, no syncope, no urgency, no vision change and no vomiting    Risk factors: anemia, congestive heart failure, coronary artery disease and heart disease    Risk factors: no diabetes, no excessive menstruation, no family hx of stroke, no neurologic disease, no new medications and no recent stressors        Review of Systems   Constitutional:  Negative for activity change, appetite change, chills, diaphoresis, fatigue and fever.   HENT:  Negative for congestion, drooling, ear pain and sore throat.    Eyes:  Negative for redness.   Respiratory:  Negative for cough, chest tightness, shortness of breath and wheezing.    Cardiovascular:  Negative for chest pain, palpitations, leg swelling, syncope and near-syncope.   Gastrointestinal:  Negative for abdominal pain, anorexia, diarrhea, dysphagia, hematochezia, melena, nausea and vomiting.   Genitourinary:  Negative for dysuria, frequency and urgency.    Musculoskeletal:  Negative for arthralgias, back pain, falls, myalgias and neck pain.   Skin:  Negative for pallor, rash and wound.   Neurological:  Positive for weakness. Negative for dizziness, seizures, loss of consciousness, speech difficulty and headaches.   Psychiatric/Behavioral:  Negative for agitation, behavioral problems, confusion and decreased concentration.    All other systems reviewed and are negative.      Past Medical History:   Diagnosis Date    Anemia     Arthritis     CHF (congestive heart failure)     Chronic kidney disease     Coronary artery disease     H/O blood clots     History of transfusion     no reaction     Hypertension        Allergies   Allergen Reactions    Adhesive Tape Dermatitis    Xanax [Alprazolam] Unknown - Low Severity       Past Surgical History:   Procedure Laterality Date    ABDOMINAL SURGERY      APPENDECTOMY      CARDIAC SURGERY       SECTION      COLONOSCOPY N/A 3/7/2018    Procedure: COLONOSCOPY;  Surgeon: Willie Gresham MD;  Location:  [facility];  Service:     CORONARY ANGIOPLASTY WITH STENT PLACEMENT      ENDOSCOPY N/A 3/6/2018    Procedure: ESOPHAGOGASTRODUODENOSCOPY;  Surgeon: Willie Gresham MD;  Location:  [facility];  Service:     PACEMAKER IMPLANTATION      left side     SKIN BIOPSY      TUBAL ABDOMINAL LIGATION         Family History   Problem Relation Age of Onset    Other Mother         cardiac disorder    Hypertension Mother     Heart disease Mother     Thyroid disease Mother     Other Father         cardiac disorder    Heart disease Father     Hypertension Father     Other Sister         cardiac disorder    Other Brother         cardiac disorder    Other Other         cardiac disorder    Other Other         cardiac disorder       Social History     Socioeconomic History    Marital status:    Tobacco Use    Smoking status: Never   Substance and Sexual Activity    Alcohol use: No    Drug use: No    Sexual activity: Defer            Objective   Physical Exam  Vitals and nursing note reviewed.   Constitutional:       General: She is not in acute distress.     Appearance: Normal appearance. She is well-developed. She is not toxic-appearing or diaphoretic.   HENT:      Head: Normocephalic and atraumatic.      Right Ear: External ear normal.      Left Ear: External ear normal.      Nose: Nose normal.      Mouth/Throat:      Pharynx: No oropharyngeal exudate.      Tonsils: No tonsillar exudate.   Eyes:      General: Lids are normal.      Conjunctiva/sclera: Conjunctivae normal.      Pupils: Pupils are equal, round, and reactive to light.   Neck:      Thyroid: No thyromegaly.   Cardiovascular:      Rate and Rhythm: Normal rate and regular rhythm.      Pulses: Normal pulses.      Heart sounds: Normal heart sounds, S1 normal and S2 normal.   Pulmonary:      Effort: Pulmonary effort is normal. No tachypnea or respiratory distress.      Breath sounds: Normal breath sounds. No decreased breath sounds, wheezing or rales.   Chest:      Chest wall: No tenderness.   Abdominal:      General: Bowel sounds are normal. There is no distension.      Palpations: Abdomen is soft.      Tenderness: There is no abdominal tenderness. There is no guarding or rebound.   Musculoskeletal:         General: No tenderness or deformity. Normal range of motion.      Cervical back: Full passive range of motion without pain, normal range of motion and neck supple.   Lymphadenopathy:      Cervical: No cervical adenopathy.   Skin:     General: Skin is warm and dry.      Coloration: Skin is not pale.      Findings: No erythema or rash.   Neurological:      Mental Status: She is alert and oriented to person, place, and time.      GCS: GCS eye subscore is 4. GCS verbal subscore is 5. GCS motor subscore is 6.      Cranial Nerves: No cranial nerve deficit.      Sensory: No sensory deficit.   Psychiatric:         Speech: Speech normal.         Behavior: Behavior normal.          Thought Content: Thought content normal.         Judgment: Judgment normal.         Procedures           ED Course  ED Course as of 11/10/23 2159   Fri Nov 10, 2023   1849 ECG 12 Lead Dyspnea  Vent. Rate :  80 BPM     Atrial Rate :  80 BPM     P-R Int :   * ms          QRS Dur : 194 ms      QT Int : 456 ms       P-R-T Axes :   * -76  97 degrees     QTc Int : 525 ms     Ventricular-paced rhythm  Abnormal ECG  When compared with ECG of 29-OCT-2023 10:39,  No significant change was found   [ES]   2018 Endorsed to DR. Alcazar [TONY]   2026 Patient received in signout.  Presentation appears consistent with CHF exacerbation.  Baseline creatinine reportedly around 2.3, 2.47 today.    Ordered 60 mg IV Lasix.  Patient is on Coumadin, PT/INR ordered  Call placed out for admission [KH]      ED Course User Index  [ES] Felipe Chaudhary MD  [TONY] Misha Tello APRN  [KH] Katarina Alcazar MD                                 Electronically signed by LANCE Levin, 11/10/23, 8:19 PM EST.            Medical Decision Making    History provided by:  Patient   used: No    Weakness - Generalized  Severity:  Mild  Onset quality:  Gradual  Timing:  Constant  Progression:  Unchanged  Chronicity:  Chronic  Context: not alcohol use, not allergies, not change in medication, not decreased sleep, not dehydration, not drug use, not increased activity, not pinched nerve, not recent infection, not stress and not urinary tract infection    Relieved by:  Nothing  Worsened by:  Nothing  Ineffective treatments:  None tried  Associated symptoms: difficulty walking    Associated symptoms: no abdominal pain, no anorexia, no aphasia, no arthralgias, no ataxia, no chest pain, no cough, no diarrhea, no dizziness, no drooling, no dysphagia, no dysuria, no numbness in extremities, no falls, no fever, no foul-smelling urine, no frequency, no headaches, no hematochezia, no lethargy, no loss of consciousness, no melena, no  myalgias, no nausea, no near-syncope, no seizures, no sensory-motor deficit, no shortness of breath, no stroke symptoms, no syncope, no urgency, no vision change and no vomiting    Risk factors: anemia, congestive heart failure, coronary artery disease and heart disease    Risk factors: no diabetes, no excessive menstruation, no family hx of stroke, no neurologic disease, no new medications and no recent stressors        Problems Addressed:  Acute on chronic congestive heart failure, unspecified heart failure type: complicated acute illness or injury    Amount and/or Complexity of Data Reviewed  External Data Reviewed: labs, radiology, ECG and notes.  Labs: ordered. Decision-making details documented in ED Course.  Radiology: ordered and independent interpretation performed. Decision-making details documented in ED Course.  ECG/medicine tests: ordered and independent interpretation performed. Decision-making details documented in ED Course.    Risk  Prescription drug management.  Decision regarding hospitalization.        Final diagnoses:   Acute on chronic congestive heart failure, unspecified heart failure type       ED Disposition  ED Disposition       ED Disposition   Decision to Admit    Condition   --    Comment   Level of Care: Telemetry [5]   Diagnosis: Acute on chronic combined systolic and diastolic CHF (congestive heart failure) [[id number]]   Admitting Physician: BLANCA MIDDLETON [[id number]]   Attending Physician: BLANCA MIDDLETON [[id number]]   Certification: I Certify That Inpatient Hospital Services Are Medically Necessary For Greater Than 2 Midnights                 No follow-up provider specified.       Medication List        ASK your doctor about these medications      furosemide 20 MG tablet  Commonly known as: LASIX  Ask about: Which instructions should I use?     metoprolol tartrate 25 MG tablet  Commonly known as: LOPRESSOR  Ask about: Which instructions should I use?                 Katarina Alcazar,  MD  11/10/23 8202

## 2023-11-11 LAB
ALBUMIN SERPL-MCNC: 3.4 G/DL (ref 3.5–5.2)
ALBUMIN/GLOB SERPL: 1.3 G/DL
ALP SERPL-CCNC: 65 U/L (ref 39–117)
ALT SERPL W P-5'-P-CCNC: 6 U/L (ref 1–33)
ANION GAP SERPL CALCULATED.3IONS-SCNC: 12.8 MMOL/L (ref 5–15)
AST SERPL-CCNC: 11 U/L (ref 1–32)
BACTERIA UR QL AUTO: ABNORMAL /HPF
BASOPHILS # BLD AUTO: 0.05 10*3/MM3 (ref 0–0.2)
BASOPHILS NFR BLD AUTO: 0.8 % (ref 0–1.5)
BILIRUB SERPL-MCNC: 0.4 MG/DL (ref 0–1.2)
BILIRUB UR QL STRIP: NEGATIVE
BUN SERPL-MCNC: 40 MG/DL (ref 8–23)
BUN/CREAT SERPL: 16 (ref 7–25)
CALCIUM SPEC-SCNC: 8.5 MG/DL (ref 8.2–9.6)
CHLORIDE SERPL-SCNC: 104 MMOL/L (ref 98–107)
CLARITY UR: CLEAR
CO2 SERPL-SCNC: 24.2 MMOL/L (ref 22–29)
COLOR UR: YELLOW
CREAT SERPL-MCNC: 2.5 MG/DL (ref 0.57–1)
DEPRECATED RDW RBC AUTO: 56.9 FL (ref 37–54)
EGFRCR SERPLBLD CKD-EPI 2021: 17.9 ML/MIN/1.73
EOSINOPHIL # BLD AUTO: 0.24 10*3/MM3 (ref 0–0.4)
EOSINOPHIL NFR BLD AUTO: 4 % (ref 0.3–6.2)
ERYTHROCYTE [DISTWIDTH] IN BLOOD BY AUTOMATED COUNT: 18.2 % (ref 12.3–15.4)
GLOBULIN UR ELPH-MCNC: 2.6 GM/DL
GLUCOSE SERPL-MCNC: 102 MG/DL (ref 65–99)
GLUCOSE UR STRIP-MCNC: NEGATIVE MG/DL
HCT VFR BLD AUTO: 29.3 % (ref 34–46.6)
HGB BLD-MCNC: 8.5 G/DL (ref 12–15.9)
HGB UR QL STRIP.AUTO: ABNORMAL
HYALINE CASTS UR QL AUTO: ABNORMAL /LPF
IMM GRANULOCYTES # BLD AUTO: 0.02 10*3/MM3 (ref 0–0.05)
IMM GRANULOCYTES NFR BLD AUTO: 0.3 % (ref 0–0.5)
KETONES UR QL STRIP: NEGATIVE
LEUKOCYTE ESTERASE UR QL STRIP.AUTO: NEGATIVE
LYMPHOCYTES # BLD AUTO: 0.93 10*3/MM3 (ref 0.7–3.1)
LYMPHOCYTES NFR BLD AUTO: 15.3 % (ref 19.6–45.3)
MCH RBC QN AUTO: 24.7 PG (ref 26.6–33)
MCHC RBC AUTO-ENTMCNC: 29 G/DL (ref 31.5–35.7)
MCV RBC AUTO: 85.2 FL (ref 79–97)
MONOCYTES # BLD AUTO: 0.58 10*3/MM3 (ref 0.1–0.9)
MONOCYTES NFR BLD AUTO: 9.6 % (ref 5–12)
NEUTROPHILS NFR BLD AUTO: 4.25 10*3/MM3 (ref 1.7–7)
NEUTROPHILS NFR BLD AUTO: 70 % (ref 42.7–76)
NITRITE UR QL STRIP: NEGATIVE
NRBC BLD AUTO-RTO: 0 /100 WBC (ref 0–0.2)
PH UR STRIP.AUTO: 7.5 [PH] (ref 5–8)
PLATELET # BLD AUTO: 216 10*3/MM3 (ref 140–450)
PMV BLD AUTO: 10.4 FL (ref 6–12)
POTASSIUM SERPL-SCNC: 4.4 MMOL/L (ref 3.5–5.2)
PROT SERPL-MCNC: 6 G/DL (ref 6–8.5)
PROT UR QL STRIP: NEGATIVE
QT INTERVAL: 456 MS
QTC INTERVAL: 525 MS
RBC # BLD AUTO: 3.44 10*6/MM3 (ref 3.77–5.28)
RBC # UR STRIP: ABNORMAL /HPF
REF LAB TEST METHOD: ABNORMAL
SODIUM SERPL-SCNC: 141 MMOL/L (ref 136–145)
SP GR UR STRIP: 1.01 (ref 1–1.03)
SQUAMOUS #/AREA URNS HPF: ABNORMAL /HPF
UROBILINOGEN UR QL STRIP: ABNORMAL
WBC # UR STRIP: ABNORMAL /HPF
WBC NRBC COR # BLD: 6.07 10*3/MM3 (ref 3.4–10.8)

## 2023-11-11 PROCEDURE — 81001 URINALYSIS AUTO W/SCOPE: CPT

## 2023-11-11 PROCEDURE — 99254 IP/OBS CNSLTJ NEW/EST MOD 60: CPT | Performed by: SPECIALIST

## 2023-11-11 PROCEDURE — 25010000002 FUROSEMIDE PER 20 MG: Performed by: HOSPITALIST

## 2023-11-11 PROCEDURE — 80053 COMPREHEN METABOLIC PANEL: CPT | Performed by: HOSPITALIST

## 2023-11-11 PROCEDURE — 85025 COMPLETE CBC W/AUTO DIFF WBC: CPT | Performed by: HOSPITALIST

## 2023-11-11 PROCEDURE — 93010 ELECTROCARDIOGRAM REPORT: CPT | Performed by: SPECIALIST

## 2023-11-11 PROCEDURE — 93005 ELECTROCARDIOGRAM TRACING: CPT | Performed by: INTERNAL MEDICINE

## 2023-11-11 PROCEDURE — 99232 SBSQ HOSP IP/OBS MODERATE 35: CPT

## 2023-11-11 RX ORDER — ACETAMINOPHEN 500 MG
500 TABLET ORAL EVERY 6 HOURS PRN
COMMUNITY

## 2023-11-11 RX ORDER — ONDANSETRON 4 MG/1
4 TABLET, FILM COATED ORAL EVERY 12 HOURS PRN
Status: CANCELLED | OUTPATIENT
Start: 2023-11-11

## 2023-11-11 RX ORDER — CHOLECALCIFEROL (VITAMIN D3) 125 MCG
10 CAPSULE ORAL NIGHTLY
Status: DISCONTINUED | OUTPATIENT
Start: 2023-11-11 | End: 2023-11-13 | Stop reason: HOSPADM

## 2023-11-11 RX ORDER — ISOSORBIDE MONONITRATE 30 MG/1
60 TABLET, EXTENDED RELEASE ORAL DAILY
Status: DISCONTINUED | OUTPATIENT
Start: 2023-11-11 | End: 2023-11-13 | Stop reason: HOSPADM

## 2023-11-11 RX ORDER — ASPIRIN 81 MG/1
81 TABLET, CHEWABLE ORAL DAILY
Status: CANCELLED | OUTPATIENT
Start: 2023-11-11

## 2023-11-11 RX ORDER — WARFARIN SODIUM 4 MG/1
4 TABLET ORAL NIGHTLY
Status: CANCELLED | OUTPATIENT
Start: 2023-11-11

## 2023-11-11 RX ORDER — DOCUSATE CALCIUM 240 MG
240 CAPSULE ORAL DAILY
COMMUNITY

## 2023-11-11 RX ORDER — WARFARIN SODIUM 5 MG/1
5 TABLET ORAL
Status: COMPLETED | OUTPATIENT
Start: 2023-11-11 | End: 2023-11-11

## 2023-11-11 RX ORDER — POTASSIUM CHLORIDE 20 MEQ/1
10 TABLET, EXTENDED RELEASE ORAL DAILY
Status: CANCELLED | OUTPATIENT
Start: 2023-11-11

## 2023-11-11 RX ORDER — LACTULOSE 10 G/15ML
20 SOLUTION ORAL DAILY PRN
Status: DISCONTINUED | OUTPATIENT
Start: 2023-11-11 | End: 2023-11-13 | Stop reason: HOSPADM

## 2023-11-11 RX ORDER — LACTULOSE 10 G/15ML
20 SOLUTION ORAL DAILY PRN
COMMUNITY

## 2023-11-11 RX ORDER — SACUBITRIL AND VALSARTAN 49; 51 MG/1; MG/1
1 TABLET, FILM COATED ORAL 2 TIMES DAILY
COMMUNITY

## 2023-11-11 RX ORDER — ALBUTEROL SULFATE 2.5 MG/3ML
2.5 SOLUTION RESPIRATORY (INHALATION) EVERY 6 HOURS PRN
Status: CANCELLED | OUTPATIENT
Start: 2023-11-11

## 2023-11-11 RX ORDER — BENZONATATE 100 MG/1
100 CAPSULE ORAL 3 TIMES DAILY PRN
COMMUNITY

## 2023-11-11 RX ORDER — ASPIRIN 81 MG/1
81 TABLET, CHEWABLE ORAL DAILY
COMMUNITY

## 2023-11-11 RX ORDER — GABAPENTIN 400 MG/1
400 CAPSULE ORAL NIGHTLY
Status: DISCONTINUED | OUTPATIENT
Start: 2023-11-11 | End: 2023-11-13 | Stop reason: HOSPADM

## 2023-11-11 RX ORDER — DOCUSATE CALCIUM 240 MG
240 CAPSULE ORAL DAILY
Status: CANCELLED | OUTPATIENT
Start: 2023-11-11

## 2023-11-11 RX ORDER — PANTOPRAZOLE SODIUM 40 MG/1
40 TABLET, DELAYED RELEASE ORAL DAILY
Status: DISCONTINUED | OUTPATIENT
Start: 2023-11-11 | End: 2023-11-13 | Stop reason: HOSPADM

## 2023-11-11 RX ORDER — WARFARIN SODIUM 1 MG/1
0.5 TABLET ORAL NIGHTLY
Status: CANCELLED | OUTPATIENT
Start: 2023-11-11

## 2023-11-11 RX ORDER — BENZONATATE 100 MG/1
100 CAPSULE ORAL 3 TIMES DAILY PRN
Status: CANCELLED | OUTPATIENT
Start: 2023-11-11

## 2023-11-11 RX ORDER — HYDROCODONE BITARTRATE AND ACETAMINOPHEN 7.5; 325 MG/1; MG/1
1 TABLET ORAL EVERY 8 HOURS PRN
Status: DISCONTINUED | OUTPATIENT
Start: 2023-11-11 | End: 2023-11-13 | Stop reason: HOSPADM

## 2023-11-11 RX ORDER — CHOLECALCIFEROL (VITAMIN D3) 125 MCG
10 CAPSULE ORAL NIGHTLY
COMMUNITY

## 2023-11-11 RX ORDER — HYDROXYZINE HYDROCHLORIDE 25 MG/1
25 TABLET, FILM COATED ORAL ONCE
Status: COMPLETED | OUTPATIENT
Start: 2023-11-11 | End: 2023-11-11

## 2023-11-11 RX ORDER — ACETAMINOPHEN 500 MG
500 TABLET ORAL EVERY 6 HOURS PRN
Status: DISCONTINUED | OUTPATIENT
Start: 2023-11-11 | End: 2023-11-13 | Stop reason: HOSPADM

## 2023-11-11 RX ADMIN — SACUBITRIL AND VALSARTAN 1 TABLET: 49; 51 TABLET, FILM COATED ORAL at 08:39

## 2023-11-11 RX ADMIN — HYDROCODONE BITARTRATE AND ACETAMINOPHEN 1 TABLET: 7.5; 325 TABLET ORAL at 21:06

## 2023-11-11 RX ADMIN — SACUBITRIL AND VALSARTAN 1 TABLET: 49; 51 TABLET, FILM COATED ORAL at 21:06

## 2023-11-11 RX ADMIN — Medication 10 MG: at 21:05

## 2023-11-11 RX ADMIN — FUROSEMIDE 40 MG: 10 INJECTION, SOLUTION INTRAMUSCULAR; INTRAVENOUS at 16:18

## 2023-11-11 RX ADMIN — PANTOPRAZOLE SODIUM 40 MG: 40 TABLET, DELAYED RELEASE ORAL at 08:39

## 2023-11-11 RX ADMIN — SERTRALINE 50 MG: 50 TABLET, FILM COATED ORAL at 21:05

## 2023-11-11 RX ADMIN — WARFARIN SODIUM 5 MG: 5 TABLET ORAL at 17:39

## 2023-11-11 RX ADMIN — HYDROCODONE BITARTRATE AND ACETAMINOPHEN 1 TABLET: 7.5; 325 TABLET ORAL at 04:21

## 2023-11-11 RX ADMIN — ASPIRIN 325 MG: 325 TABLET ORAL at 00:08

## 2023-11-11 RX ADMIN — METOPROLOL TARTRATE 25 MG: 25 TABLET, FILM COATED ORAL at 08:39

## 2023-11-11 RX ADMIN — Medication 10 ML: at 08:39

## 2023-11-11 RX ADMIN — DOCUSATE SODIUM 50 MG AND SENNOSIDES 8.6 MG 2 TABLET: 8.6; 5 TABLET, FILM COATED ORAL at 21:05

## 2023-11-11 RX ADMIN — ASPIRIN 81 MG: 81 TABLET, CHEWABLE ORAL at 08:50

## 2023-11-11 RX ADMIN — GABAPENTIN 400 MG: 400 CAPSULE ORAL at 21:06

## 2023-11-11 RX ADMIN — ISOSORBIDE MONONITRATE 60 MG: 30 TABLET, EXTENDED RELEASE ORAL at 08:39

## 2023-11-11 RX ADMIN — Medication 10 ML: at 21:05

## 2023-11-11 RX ADMIN — METOPROLOL TARTRATE 25 MG: 25 TABLET, FILM COATED ORAL at 21:05

## 2023-11-11 RX ADMIN — HYDROXYZINE HYDROCHLORIDE 25 MG: 25 TABLET, FILM COATED ORAL at 22:58

## 2023-11-11 NOTE — PAYOR COMM NOTE
"[facility]  LORRAINE SILVA  PHONE  [phone]  FAX  [phone]  NPI:  [id number]    PENDING REF#[id number]    Tre Kinney (89 y.o. Female)       Date of Birth   12/09/1932    Social Security Number       Address   [address] [facility] [address]    Home Phone   [phone]    MRN   [id number]       [place]   Spiritism    Marital Status                               Admission Date   11/10/23    Admission Type   Emergency    Admitting Provider   Silas Barba MD    Attending Provider   Beck Blum MD    Department, Room/Bed   [address], 3304/1S       Discharge Date       Discharge Disposition       Discharge Destination                                 Attending Provider: Beck Blum MD    Allergies: Adhesive Tape, Xanax [Alprazolam]    Isolation: None   Infection: None   Code Status: CPR    Ht: 170.2 cm (67.01\")   Wt: 87.6 kg (193 lb 3.2 oz)    Admission Cmt: None   Principal Problem: Acute on chronic combined systolic and diastolic CHF (congestive heart failure) [I50.43]                   Active Insurance as of 11/10/2023       Primary Coverage       Payor Plan Insurance Group Employer/Plan Group    [place]        Payor Plan Address Payor Plan Phone Number Payor Plan Fax Number Effective Dates    [address] [phone]  6/9/2023 - None Entered    [facility] [id number]         Subscriber Name Subscriber Birth Date Member ID       TRE KINNEY 12/9/1932 [id number]               Secondary Coverage       Payor Plan Insurance Group Employer/Plan Group    ANTHEM MEDICARE REPLACEMENT ANTHEM MEDICARE ADVANTAGE [id number]       Payor Plan Address Payor Plan Phone Number Payor Plan Fax Number Effective Dates    [address] [phone]  1/1/2022 - None Entered    [facility] [address]         Subscriber Name Subscriber Birth Date Member ID       TRE KINNEY 12/9/1932 [id number]                     Emergency Contacts       "  (Rel.) Home Phone Work Phone Mobile Phone    Leno Kinney (Son) [phone] -- [phone]    Willian Kinney (Son) [phone] -- [phone]    Valdez Kinney (Son) [phone] -- --    BENITO KINNEY (Son) [phone] --                 History & Physical        Latonia Mireles PA-C at 11/10/23 2120       Attestation signed by Silas Barba MD at 11/10/23 2322    I have discussed this patient with Latonia Mireles PA-C, and have reviewed this documentation. Agree with CHF treatment. Troponin mildly elevated with essentially flat trend. In light of reports of chest pain which raises concerns for ischemia contributing to worsening CHF, will treat with full dose ASA and continue baby ASA daily starting in the morning. Already anticoagulated with warfarin. EKG not felt to show any evidence of acute ischemia. Await updated ECHO results and further recommendations from cardiology tomorrow.                        [facility] Medicine Services  HISTORY & PHYSICAL    Patient Identification:  Name:  Tre Kinney  Age:  89 y.o.  Sex:  female  :  1932  MRN:  [id number]   Visit Number:  [id number]  Admit Date: 11/10/2023   Primary Care Physician:  Benito Navarro MD     Subjective     Chief complaint:   Chief Complaint   Patient presents with    Abnormal Lab    Shortness of Breath     History of presenting illness:   Patient is a 89 y.o. female with past medical history significant for  CHF, chronic kidney disease, coronary artery disease s/p stenting, history of blood clots, chronic anticoagulation with Coumadin, SSS s/p pacemaker placement, chronic diastolic CHF, and iron deficiency anemia  that presented to the [facility] emergency department for evaluation of shortness of breath and leg swelling.    Patient examined at bedside in ED.  Patient states she notably became more short of breath yesterday.  States her legs have been getting  progressively more swollen as well.  She does also admit to chest pain that she describes as pressure, and slight dysuria.  She states the chest pain is intermittent.  She also notes she is feeling generally unwell.  Patient states they placed a Rodriguez cath prior to her leaving the nursing home due to giving her Lasix.  States she normally does not need a catheter.    Upon arrival to the ED, vitals were temperature 97.9, HR 81, RR 20, /70, SPO2 96% on room air.  Labs significant for proBNP 27,093, initial high-sensitivity troponin 42, repeat 44.  Patent 2.47, BUN 41, GFR 18.1.  INR 2.1.  Hemoglobin 8.8, hematocrit 29.9.  UA with moderate blood, 6-10 RBC, trace bacteria.    CXR shows enlarged heart size, left-sided pacemaker, small right and left pleural effusion, coarsened bronchovascular pattern, left lower lobe atelectasis, trace interstitial edema.    Patient has been admitted to the telemetry unit.  ---------------------------------------------------------------------------------------------------------------------   Review of Systems   Constitutional:  Positive for fatigue. Negative for chills, diaphoresis and fever.   Respiratory:  Positive for shortness of breath. Negative for cough and wheezing.    Cardiovascular:  Positive for chest pain and leg swelling. Negative for palpitations.   Gastrointestinal:  Negative for abdominal pain, constipation, diarrhea, nausea and vomiting.   Genitourinary:  Positive for dysuria. Negative for difficulty urinating and flank pain.   Musculoskeletal:  Positive for myalgias (legs). Negative for arthralgias and neck stiffness.   Skin:  Negative for rash and wound.   Neurological:  Negative for dizziness, weakness and light-headedness.   Psychiatric/Behavioral:  Negative for agitation and confusion.       ---------------------------------------------------------------------------------------------------------------------   Past Medical History:   Diagnosis Date    Anemia      Arthritis     CHF (congestive heart failure)     Chronic kidney disease     Coronary artery disease     H/O blood clots     History of transfusion     no reaction     Hypertension      Past Surgical History:   Procedure Laterality Date    ABDOMINAL SURGERY      APPENDECTOMY      CARDIAC SURGERY       SECTION      COLONOSCOPY N/A 3/7/2018    Procedure: COLONOSCOPY;  Surgeon: Willie Gresham MD;  Location:  [facility];  Service:     CORONARY ANGIOPLASTY WITH STENT PLACEMENT      ENDOSCOPY N/A 3/6/2018    Procedure: ESOPHAGOGASTRODUODENOSCOPY;  Surgeon: Willie Gresham MD;  Location:  [facility];  Service:     PACEMAKER IMPLANTATION      left side     SKIN BIOPSY      TUBAL ABDOMINAL LIGATION       Family History   Problem Relation Age of Onset    Other Mother         cardiac disorder    Hypertension Mother     Heart disease Mother     Thyroid disease Mother     Other Father         cardiac disorder    Heart disease Father     Hypertension Father     Other Sister         cardiac disorder    Other Brother         cardiac disorder    Other Other         cardiac disorder    Other Other         cardiac disorder     Social History     Socioeconomic History    Marital status:    Tobacco Use    Smoking status: Never   Vaping Use    Vaping Use: Never used   Substance and Sexual Activity    Alcohol use: No    Drug use: No    Sexual activity: Defer     ---------------------------------------------------------------------------------------------------------------------   Allergies:  Adhesive tape and Xanax [alprazolam]  ---------------------------------------------------------------------------------------------------------------------   Medications below are reported home medications pulling from within the system; at this time, these medications have not been reconciled unless otherwise specified and are in the verification process for further verifcation as current home medications.    Prior to  Admission Medications       Prescriptions Last Dose Informant Patient Reported? Taking?    albuterol sulfate  (90 Base) MCG/ACT inhaler   Yes Yes    Inhale 2 puffs Every 4 (Four) Hours As Needed for Wheezing.    furosemide (LASIX) 20 MG tablet   Yes Yes    Take 1 tablet by mouth 3 (Three) Times a Day.    gabapentin (NEURONTIN) 400 MG capsule  Self Yes Yes    Take 1 capsule by mouth Every Night.    HYDROcodone-acetaminophen (NORCO) 7.5-325 MG per tablet  Self Yes Yes    Take 1 tablet by mouth Every 6 (Six) Hours As Needed for Moderate Pain.    isosorbide mononitrate (IMDUR) 60 MG 24 hr tablet  Self Yes Yes    Take 1 tablet by mouth Daily.    metoprolol tartrate (LOPRESSOR) 25 MG tablet   Yes Yes    Take 1 tablet by mouth 2 (Two) Times a Day.    ondansetron (ZOFRAN) 4 MG tablet   Yes Yes    Take 1 tablet by mouth Every 12 (Twelve) Hours As Needed for Nausea or Vomiting.    pantoprazole (PROTONIX) 40 MG EC tablet  Self Yes Yes    Take 1 tablet by mouth Daily.    potassium chloride (MICRO-K) 10 MEQ CR capsule   Yes Yes    Take 1 capsule by mouth Daily.    sacubitril-valsartan (Entresto) 49-51 MG tablet   Yes Yes    Take 1 tablet by mouth 2 (Two) Times a Day.    sertraline (ZOLOFT) 50 MG tablet   Yes Yes    Take 1 tablet by mouth Daily.    warfarin (COUMADIN) 1 MG tablet   Yes Yes    Take 1 tablet by mouth Daily.    warfarin (COUMADIN) 4 MG tablet   Yes Yes    Take 1 tablet by mouth Daily.          ---------------------------------------------------------------------------------------------------------------------    Objective     Hospital Scheduled Meds:      No current facility-administered medications for this encounter.      Current listed hospital scheduled medications may not yet reflect those currently placed in orders that are signed and held, awaiting patient's arrival to floor/unit.    ---------------------------------------------------------------------------------------------------------------------    Vital Signs:  Temp:  [97.9 °F (36.6 °C)-98.4 °F (36.9 °C)] 98.4 °F (36.9 °C)  Heart Rate:  [73-90] 77  Resp:  [20] 20  BP: (138-178)/(68-89) 178/82  Mean Arterial Pressure (Non-Invasive) for the past 24 hrs (Last 3 readings):   Noninvasive MAP (mmHg)   11/10/23 2222 106   11/10/23 1900 89   11/10/23 1840 95     SpO2 Percentage    11/10/23 1844 11/10/23 1900 11/10/23 2222   SpO2: 96% 98% 97%     SpO2:  [96 %-98 %] 97 %  on   ;   Device (Oxygen Therapy): room air    Body mass index is 30.25 kg/m².  Wt Readings from Last 3 Encounters:   11/10/23 87.6 kg (193 lb 3.2 oz)   10/29/23 65.8 kg (145 lb)   23 66.7 kg (147 lb)     ---------------------------------------------------------------------------------------------------------------------   Physical Exam:  Physical Exam  Constitutional:       General: She is not in acute distress.     Appearance: Normal appearance.   HENT:      Head: Normocephalic and atraumatic.      Right Ear: External ear normal.      Left Ear: External ear normal.      Nose: No nasal deformity.      Mouth/Throat:      Lips: Pink.      Mouth: Mucous membranes are moist.   Eyes:      Conjunctiva/sclera: Conjunctivae normal.      Pupils: Pupils are equal, round, and reactive to light.   Cardiovascular:      Rate and Rhythm: Normal rate and regular rhythm.      Heart sounds: Normal heart sounds.   Pulmonary:      Effort: Pulmonary effort is normal.      Breath sounds: Normal breath sounds. No wheezing, rhonchi or rales.   Abdominal:      General: Abdomen is flat. Bowel sounds are normal.      Palpations: Abdomen is soft.      Tenderness: There is no guarding or rebound.   Genitourinary:     Comments: No swan catheter in place   Musculoskeletal:      Cervical back: Neck supple. Normal range of motion.      Right lower le+ Pitting Edema present.      Left lower le+ Pitting Edema present.   Skin:     General: Skin is warm and dry.   Neurological:      General: No focal deficit present.      " Mental Status: She is alert and oriented to person, place, and time.   Psychiatric:         Mood and Affect: Mood normal.         Behavior: Behavior normal.       ---------------------------------------------------------------------------------------------------------------------  EKG: Regular paced rhythm, heart rate 80.      Telemetry:        I have personally reviewed the EKG/Telemetry strip  ---------------------------------------------------------------------------------------------------------------------   Results from last 7 days   Lab Units 11/10/23  2136 11/10/23  1854   HSTROP T ng/L 44* 42*     Results from last 7 days   Lab Units 11/10/23  1854 11/10/23  1612   PROBNP pg/mL 27,093.0* 24,828.0*         Results from last 7 days   Lab Units 11/10/23  2136 11/10/23  1854   CRP mg/dL  --  <0.30   WBC 10*3/mm3  --  5.30   HEMOGLOBIN g/dL  --  8.8*   HEMATOCRIT %  --  29.9*   MCV fL  --  85.4   MCHC g/dL  --  29.4*   PLATELETS 10*3/mm3  --  212   INR  2.10*  --      Results from last 7 days   Lab Units 11/10/23  1854   SODIUM mmol/L 141   POTASSIUM mmol/L 4.9   CHLORIDE mmol/L 105   CO2 mmol/L 24.8   BUN mg/dL 41*   CREATININE mg/dL 2.47*   CALCIUM mg/dL 8.7   GLUCOSE mg/dL 158*   ALBUMIN g/dL 3.6   BILIRUBIN mg/dL 0.4   ALK PHOS U/L 68   AST (SGOT) U/L 12   ALT (SGPT) U/L 8   Estimated Creatinine Clearance: 17.2 mL/min (A) (by C-G formula based on SCr of 2.47 mg/dL (H)).  No results found for: \"AMMONIA\"    Hemoglobin A1C   Date/Time Value Ref Range Status   11/10/2023 1854 5.50 4.80 - 5.60 % Final     Lab Results   Component Value Date    HGBA1C 5.50 11/10/2023     Lab Results   Component Value Date    TSH 3.030 10/07/2022       No results found for: \"BLOODCX\"  No results found for: \"URINECX\"  No results found for: \"WOUNDCX\"  No results found for: \"STOOLCX\"  No results found for: \"RESPCX\"  No results found for: \"MRSACX\"  Pain Management Panel  More data exists         Latest Ref Rng & Units 9/20/2022 " 7/24/2019   Pain Management Panel   Creatinine, Urine mg/dL 110.9  48.9  48.9      I have personally reviewed the above laboratory results.   ---------------------------------------------------------------------------------------------------------------------  Imaging Results (Last 7 Days)       Procedure Component Value Units Date/Time    XR Chest 1 View [[id number]] Collected: 11/10/23 2012     Updated: 11/10/23 2016    Narrative:      PROCEDURE: Portable chest x-ray examination performed on November 10,  2023. Single view. Upright position.     HISTORY: Shortness of breath.     FINDINGS:     Enlarged heart size.  Left-sided pacemaker with leads to the right atrium and right ventricle.  Coarsened bronchovascular pattern to the lungs.  Small right and left pleural effusion.  Trace interstitial edema.  No pneumothorax.  No free air in the upper abdomen.  Mild degenerative disc disease throughout the thoracic spine.       Impression:         1.  Enlarged heart size.  2.  Left-sided pacemaker with leads to the right atrium and right  ventricle.  3.  Small right and left pleural effusion.  4.  Coarsened bronchovascular pattern to the lungs.  5.  Left lower lobe atelectasis.  6.  Trace interstitial edema.     This report was finalized on 11/10/2023 8:14 PM by Bubba Snow MD.             I have personally reviewed the above radiology results.     Last Echocardiogram:  Results for orders placed during the hospital encounter of 09/19/22    Adult Transthoracic Echo Complete W/ Cont if Necessary Per Protocol    Interpretation Summary  · Left ventricular ejection fraction appears to be 51 - 55%. Left ventricular systolic function is low normal.  · Left ventricular wall thickness is consistent with mild concentric hypertrophy.  · Left ventricular diastolic function was indeterminate.  · The right ventricular cavity is moderately dilated.  · Moderately reduced right ventricular systolic function noted.  · The left atrial  cavity is moderate to severely dilated  · The right atrial cavity is moderately dilated.  · Moderate tricuspid valve regurgitation is present.  · Estimated right ventricular systolic pressure from tricuspid regurgitation is markedly elevated (>55 mmHg).  · Severe pulmonary hypertension is present.  · Electronic lead present in the ventricle  · There is no evidence of pericardial effusion    ---------------------------------------------------------------------------------------------------------------------    Assessment & Plan      Active Hospital Problems    Diagnosis  POA    **Acute on chronic combined systolic and diastolic CHF (congestive heart failure) [I50.43]  Yes     Acute on chronic combined systolic and diastolic CHF, POA  Chronic Kidney Disease Stage IV  Dysuria  Presented with complaints of worsening shortness of breath and leg swelling  CXR shows cardiomegaly.  Bilateral lower extremity edema on exam.   Lasix 60 mg IV given in ED.  Monitor urine output and renal response and can further diuresis renally tolerated.  Plans for Lasix 40 mg IV daily for now.  Inpatient cardiology consult, assistance appreciated  Patient presented with Cr 2.47, baseline around 2.2.  Monitor renal function closely due to diuresis  Trend Cr and urine output, avoid nephrotoxins, NSAIDs, dehydration and contrast as able.   Urine culture added due to complaints of dysuria and trace bacteria on UA.  Monitor strict I's and O's and daily weights    CHRONIC MEDICAL PROBLEMS    Essential hypertension  BP appears variably controlled   Plan to resume home antihypertensive regimen once reconciled per pharmacy with appropriate holding parameters to prevent hypotension and/or bradycardia   Closely monitor BP per hospital protocol, titrate medications as necessary    Coronary artery disease s/p stenting,  History of blood clots  Chronic anticoagulation with Coumadin  SSS s/p pacemaker placement  Restart home meds pending med rec  Continuous  cardiac monitoring  Monitor for signs and symptoms of bleeding  Monitor INR  Iron deficiency anemia   H&H stable  Baseline hemoglobin is around 9, hemoglobin admission 8.8  Monitor daily CBC    F/E/N: Hydration.  Continue monitor electrolytes.  Cardiac/renal diet.    ---------------------------------------------------  DVT Prophylaxis: Chronically anticoagulated with warfarin  GI Prophylaxis: Bowel regimen  Activity: Up with assistance    The patient is considered to be a high risk patient due to: CHF with acute exacerbation    INPATIENT status due to the need for care which can only be reasonably provided in an hospital setting such as aggressive/expedited ancillary services and/or consultation services, the necessity for IV medications, close physician monitoring and/or the possible need for procedures.  In such, I feel patient’s risk for adverse outcomes and need for care warrant INPATIENT evaluation and predict the patient’s care encounter to likely last beyond 2 midnights.     Code Status: DNR.  Full support.  Patient would like to further discuss this with palliative care    Disposition/Discharge planning: Pending clinical course, plans to discharge back to nursing home when medically stable    I have discussed the patient's assessment and plan with Dr. Jameson Mireles PA-C  Hospitalist Service -- [facility]       11/10/23  22:57 EST    Attending Physician: Silas Barba MD        Electronically signed by Silas Barba MD at 11/10/23 2322          Emergency Department Notes        Jeevan Maza RN at 11/10/23 2151          Report called to Emerita MNEDOZA    Electronically signed by Jeevan Maza RN at 11/10/23 2151       Linn Rivera RN at 11/10/23 1913          Report given to gabriela ramirez    Electronically signed by Linn Rivera RN at 11/10/23 1913       Katarina Alcazar MD at 11/10/23 1831          Subjective     History provided by:  Patient    used: No    Weakness - Generalized  Severity:  Mild  Onset quality:  Gradual  Timing:  Constant  Progression:  Unchanged  Chronicity:  Chronic  Context: not alcohol use, not allergies, not change in medication, not decreased sleep, not dehydration, not drug use, not increased activity, not pinched nerve, not recent infection, not stress and not urinary tract infection    Relieved by:  Nothing  Worsened by:  Nothing  Ineffective treatments:  None tried  Associated symptoms: difficulty walking    Associated symptoms: no abdominal pain, no anorexia, no aphasia, no arthralgias, no ataxia, no chest pain, no cough, no diarrhea, no dizziness, no drooling, no dysphagia, no dysuria, no numbness in extremities, no falls, no fever, no foul-smelling urine, no frequency, no headaches, no hematochezia, no lethargy, no loss of consciousness, no melena, no myalgias, no nausea, no near-syncope, no seizures, no sensory-motor deficit, no shortness of breath, no stroke symptoms, no syncope, no urgency, no vision change and no vomiting    Risk factors: anemia, congestive heart failure, coronary artery disease and heart disease    Risk factors: no diabetes, no excessive menstruation, no family hx of stroke, no neurologic disease, no new medications and no recent stressors        Review of Systems   Constitutional:  Negative for activity change, appetite change, chills, diaphoresis, fatigue and fever.   HENT:  Negative for congestion, drooling, ear pain and sore throat.    Eyes:  Negative for redness.   Respiratory:  Negative for cough, chest tightness, shortness of breath and wheezing.    Cardiovascular:  Negative for chest pain, palpitations, leg swelling, syncope and near-syncope.   Gastrointestinal:  Negative for abdominal pain, anorexia, diarrhea, dysphagia, hematochezia, melena, nausea and vomiting.   Genitourinary:  Negative for dysuria, frequency and urgency.   Musculoskeletal:  Negative for arthralgias, back pain, falls, myalgias  and neck pain.   Skin:  Negative for pallor, rash and wound.   Neurological:  Positive for weakness. Negative for dizziness, seizures, loss of consciousness, speech difficulty and headaches.   Psychiatric/Behavioral:  Negative for agitation, behavioral problems, confusion and decreased concentration.    All other systems reviewed and are negative.      Past Medical History:   Diagnosis Date    Anemia     Arthritis     CHF (congestive heart failure)     Chronic kidney disease     Coronary artery disease     H/O blood clots     History of transfusion     no reaction     Hypertension        Allergies   Allergen Reactions    Adhesive Tape Dermatitis    Xanax [Alprazolam] Unknown - Low Severity       Past Surgical History:   Procedure Laterality Date    ABDOMINAL SURGERY      APPENDECTOMY      CARDIAC SURGERY       SECTION      COLONOSCOPY N/A 3/7/2018    Procedure: COLONOSCOPY;  Surgeon: Willie Gresham MD;  Location:  [facility];  Service:     CORONARY ANGIOPLASTY WITH STENT PLACEMENT      ENDOSCOPY N/A 3/6/2018    Procedure: ESOPHAGOGASTRODUODENOSCOPY;  Surgeon: Willie Gresham MD;  Location:  [facility];  Service:     PACEMAKER IMPLANTATION      left side     SKIN BIOPSY      TUBAL ABDOMINAL LIGATION         Family History   Problem Relation Age of Onset    Other Mother         cardiac disorder    Hypertension Mother     Heart disease Mother     Thyroid disease Mother     Other Father         cardiac disorder    Heart disease Father     Hypertension Father     Other Sister         cardiac disorder    Other Brother         cardiac disorder    Other Other         cardiac disorder    Other Other         cardiac disorder       Social History     Socioeconomic History    Marital status:    Tobacco Use    Smoking status: Never   Substance and Sexual Activity    Alcohol use: No    Drug use: No    Sexual activity: Defer           Objective   Physical Exam  Vitals and nursing note reviewed.    Constitutional:       General: She is not in acute distress.     Appearance: Normal appearance. She is well-developed. She is not toxic-appearing or diaphoretic.   HENT:      Head: Normocephalic and atraumatic.      Right Ear: External ear normal.      Left Ear: External ear normal.      Nose: Nose normal.      Mouth/Throat:      Pharynx: No oropharyngeal exudate.      Tonsils: No tonsillar exudate.   Eyes:      General: Lids are normal.      Conjunctiva/sclera: Conjunctivae normal.      Pupils: Pupils are equal, round, and reactive to light.   Neck:      Thyroid: No thyromegaly.   Cardiovascular:      Rate and Rhythm: Normal rate and regular rhythm.      Pulses: Normal pulses.      Heart sounds: Normal heart sounds, S1 normal and S2 normal.   Pulmonary:      Effort: Pulmonary effort is normal. No tachypnea or respiratory distress.      Breath sounds: Normal breath sounds. No decreased breath sounds, wheezing or rales.   Chest:      Chest wall: No tenderness.   Abdominal:      General: Bowel sounds are normal. There is no distension.      Palpations: Abdomen is soft.      Tenderness: There is no abdominal tenderness. There is no guarding or rebound.   Musculoskeletal:         General: No tenderness or deformity. Normal range of motion.      Cervical back: Full passive range of motion without pain, normal range of motion and neck supple.   Lymphadenopathy:      Cervical: No cervical adenopathy.   Skin:     General: Skin is warm and dry.      Coloration: Skin is not pale.      Findings: No erythema or rash.   Neurological:      Mental Status: She is alert and oriented to person, place, and time.      GCS: GCS eye subscore is 4. GCS verbal subscore is 5. GCS motor subscore is 6.      Cranial Nerves: No cranial nerve deficit.      Sensory: No sensory deficit.   Psychiatric:         Speech: Speech normal.         Behavior: Behavior normal.         Thought Content: Thought content normal.         Judgment: Judgment  normal.         Procedures          ED Course  ED Course as of 11/10/23 2159   Fri Nov 10, 2023   1849 ECG 12 Lead Dyspnea  Vent. Rate :  80 BPM     Atrial Rate :  80 BPM     P-R Int :   * ms          QRS Dur : 194 ms      QT Int : 456 ms       P-R-T Axes :   * -76  97 degrees     QTc Int : 525 ms     Ventricular-paced rhythm  Abnormal ECG  When compared with ECG of 29-OCT-2023 10:39,  No significant change was found   [ES]   2018 Endorsed to DR. Alcazar [TONY]   2026 Patient received in signout.  Presentation appears consistent with CHF exacerbation.  Baseline creatinine reportedly around 2.3, 2.47 today.    Ordered 60 mg IV Lasix.  Patient is on Coumadin, PT/INR ordered  Call placed out for admission [KH]      ED Course User Index  [ES] Felipe Chaudhary MD  [TONY] Misha Tello APRN  [KH] Katarina Alcazar MD                                 Electronically signed by LANCE Levin, 11/10/23, 8:19 PM EST.            Medical Decision Making    History provided by:  Patient   used: No    Weakness - Generalized  Severity:  Mild  Onset quality:  Gradual  Timing:  Constant  Progression:  Unchanged  Chronicity:  Chronic  Context: not alcohol use, not allergies, not change in medication, not decreased sleep, not dehydration, not drug use, not increased activity, not pinched nerve, not recent infection, not stress and not urinary tract infection    Relieved by:  Nothing  Worsened by:  Nothing  Ineffective treatments:  None tried  Associated symptoms: difficulty walking    Associated symptoms: no abdominal pain, no anorexia, no aphasia, no arthralgias, no ataxia, no chest pain, no cough, no diarrhea, no dizziness, no drooling, no dysphagia, no dysuria, no numbness in extremities, no falls, no fever, no foul-smelling urine, no frequency, no headaches, no hematochezia, no lethargy, no loss of consciousness, no melena, no myalgias, no nausea, no near-syncope, no seizures, no sensory-motor  deficit, no shortness of breath, no stroke symptoms, no syncope, no urgency, no vision change and no vomiting    Risk factors: anemia, congestive heart failure, coronary artery disease and heart disease    Risk factors: no diabetes, no excessive menstruation, no family hx of stroke, no neurologic disease, no new medications and no recent stressors        Problems Addressed:  Acute on chronic congestive heart failure, unspecified heart failure type: complicated acute illness or injury    Amount and/or Complexity of Data Reviewed  External Data Reviewed: labs, radiology, ECG and notes.  Labs: ordered. Decision-making details documented in ED Course.  Radiology: ordered and independent interpretation performed. Decision-making details documented in ED Course.  ECG/medicine tests: ordered and independent interpretation performed. Decision-making details documented in ED Course.    Risk  Prescription drug management.  Decision regarding hospitalization.        Final diagnoses:   Acute on chronic congestive heart failure, unspecified heart failure type       ED Disposition  ED Disposition       ED Disposition   Decision to Admit    Condition   --    Comment   Level of Care: Telemetry [5]   Diagnosis: Acute on chronic combined systolic and diastolic CHF (congestive heart failure) [[id number]]   Admitting Physician: BLANCA MIDDLETON [[id number]]   Attending Physician: BLANCA MIDDLETON [[id number]]   Certification: I Certify That Inpatient Hospital Services Are Medically Necessary For Greater Than 2 Midnights                 No follow-up provider specified.       Medication List        ASK your doctor about these medications      furosemide 20 MG tablet  Commonly known as: LASIX  Ask about: Which instructions should I use?     metoprolol tartrate 25 MG tablet  Commonly known as: LOPRESSOR  Ask about: Which instructions should I use?                 Katarina Alcazar MD  11/10/23 2783      Electronically signed by Katarina Alcazar MD  at 11/10/23 2159       Current Facility-Administered Medications   Medication Dose Route Frequency Provider Last Rate Last Admin    acetaminophen (TYLENOL) tablet 500 mg  500 mg Oral Q6H PRN Silas Barba MD        aspirin chewable tablet 81 mg  81 mg Oral Daily Silas Barba MD   81 mg at 11/11/23 0850    sennosides-docusate (PERICOLACE) 8.6-50 MG per tablet 2 tablet  2 tablet Oral BID Silas Barba MD        And    polyethylene glycol (MIRALAX) packet 17 g  17 g Oral Daily PRN Silas Barba MD        And    bisacodyl (DULCOLAX) EC tablet 5 mg  5 mg Oral Daily PRN Silas Barba MD        And    bisacodyl (DULCOLAX) suppository 10 mg  10 mg Rectal Daily PRN Silas Barba MD        furosemide (LASIX) injection 40 mg  40 mg Intravenous Daily Silas Barba MD   40 mg at 11/11/23 0839    gabapentin (NEURONTIN) capsule 400 mg  400 mg Oral Nightly Silas Barba MD        HYDROcodone-acetaminophen (NORCO) 7.5-325 MG per tablet 1 tablet  1 tablet Oral Q8H PRN Silas Barba MD   1 tablet at 11/11/23 0421    isosorbide mononitrate (IMDUR) 24 hr tablet 60 mg  60 mg Oral Daily Silas Babra MD   60 mg at 11/11/23 0839    lactulose (CHRONULAC) 10 GM/15ML solution 20 g  20 g Oral Daily PRN Silas Barba MD        melatonin tablet 10 mg  10 mg Oral Nightly Silas Barba MD        metoprolol tartrate (LOPRESSOR) tablet 25 mg  25 mg Oral BID Silas Barba MD   25 mg at 11/11/23 0839    nitroglycerin (NITROSTAT) SL tablet 0.4 mg  0.4 mg Sublingual Q5 Min PRN Silas Barba MD        pantoprazole (PROTONIX) EC tablet 40 mg  40 mg Oral Daily Silas Barba MD   40 mg at 11/11/23 0839    Pharmacy to dose warfarin   Does not apply Continuous PRN Silas Barba MD        sacubitril-valsartan (ENTRESTO) 49-51 MG tablet 1 tablet  1 tablet Oral BID Silas Barba MD   1 tablet at 11/11/23 0224     sertraline (ZOLOFT) tablet 50 mg  50 mg Oral Nightly Silas Barba MD        sodium chloride 0.9 % flush 10 mL  10 mL Intravenous Q12H Silas Barba MD   10 mL at 11/11/23 0839    sodium chloride 0.9 % flush 10 mL  10 mL Intravenous PRN Silas Barba MD        sodium chloride 0.9 % infusion 40 mL  40 mL Intravenous PRN Silas Barba MD         Orders (last 24 hrs)        Start     Ordered    11/11/23 2100  gabapentin (NEURONTIN) capsule 400 mg  Nightly         11/11/23 0251 11/11/23 2100  sertraline (ZOLOFT) tablet 50 mg  Nightly         11/11/23 0251 11/11/23 2100  melatonin tablet 10 mg  Nightly         11/11/23 0251 11/11/23 0900  furosemide (LASIX) injection 40 mg  Daily         11/10/23 2317    11/11/23 0900  aspirin chewable tablet 81 mg  Daily         11/10/23 2319    11/11/23 0900  isosorbide mononitrate (IMDUR) 24 hr tablet 60 mg  Daily         11/11/23 0251 11/11/23 0900  pantoprazole (PROTONIX) EC tablet 40 mg  Daily         11/11/23 0251 11/11/23 0900  metoprolol tartrate (LOPRESSOR) tablet 25 mg  2 Times Daily         11/11/23 0251 11/11/23 0900  sacubitril-valsartan (ENTRESTO) 49-51 MG tablet 1 tablet  2 Times Daily         11/11/23 0251 11/11/23 0814  Diet: Cardiac Diets; Low Sodium (2g); Texture: Soft to Chew (NDD 3); Soft to Chew: Whole Meat; Fluid Consistency: Thin (IDDSI 0)  Diet Effective Now         11/11/23 0814    11/11/23 0800  Oral Care  2 Times Daily       11/10/23 2317    11/11/23 0702  Inpatient Palliative Care MD Consult  IN AM        Specialty:  Hospice and Palliative Medicine  Provider:  Liyah Jaquez APRN    11/11/23 0240    11/11/23 0600  CBC Auto Differential  Morning Draw         11/10/23 2317    11/11/23 0600  Comprehensive Metabolic Panel  Morning Draw         11/10/23 2317    11/11/23 0249  HYDROcodone-acetaminophen (NORCO) 7.5-325 MG per tablet 1 tablet  Every 8 Hours PRN         11/11/23 0251    11/11/23 0249   acetaminophen (TYLENOL) tablet 500 mg  Every 6 Hours PRN         11/11/23 0251    11/11/23 0249  lactulose (CHRONULAC) 10 GM/15ML solution 20 g  Daily PRN         11/11/23 0251    11/11/23 0020  Urinalysis, Microscopic Only - Indwelling Urethral Catheter  Once        Comments: Use existing specimen if able      11/11/23 0019    11/11/23 0015  sodium chloride 0.9 % flush 10 mL  Every 12 Hours Scheduled         11/10/23 2317    11/11/23 0015  sennosides-docusate (PERICOLACE) 8.6-50 MG per tablet 2 tablet  2 Times Daily        [facility] Report.    11/10/23 2317    11/11/23 0015  heparin (porcine) 5000 UNIT/ML injection 5,000 Units  Every 12 Hours Scheduled,   Status:  Discontinued         11/10/23 2317    11/11/23 0015  aspirin tablet 325 mg  Once         11/10/23 2319    11/11/23 0000  Vital Signs  Every 8 Hours        Comments: Per per hospital policy    11/10/23 2317    11/11/23 0000  Strict Intake & Output  Every 6 Hours         11/10/23 2317    11/10/23 2350  PT Consult: Eval & Treat Functional Mobility Below Baseline  Once        Comments: Reason Why PT Needed: mobility    11/10/23 2349    11/10/23 2318  Notify Physician (with Parameters)  Until Discontinued         11/10/23 2317    11/10/23 2318  Insert Peripheral IV  Once         11/10/23 2317    11/10/23 2318  Saline Lock & Maintain IV Access  Continuous,   Status:  Canceled         11/10/23 2317    11/10/23 2318  Continuous Cardiac Monitoring  Continuous        Comments: Follow Standing Orders As Outlined in Process Instructions (Open Order Report to View Full Instructions)    11/10/23 2317    11/10/23 2318  Telemetry - Maintain IV Access  Continuous         11/10/23 2317    11/10/23 2318  Telemetry - Place Orders & Notify Provider of Results When Patient Experiences Acute Chest Pain, Dysrhythmia or Respiratory Distress  Until Discontinued         11/10/23 2317    11/10/23 2318  May Be Off Telemetry for Tests  Continuous          11/10/23 2317    11/10/23 2318  Pulse Oximetry, Continuous  Continuous         11/10/23 2317    11/10/23 2318  Daily Weights  Daily       11/10/23 2317    11/10/23 2318  Diet: Cardiac Diets, Renal Diets; Healthy Heart (2-3 Na+); Low Sodium (2-3g), Low Potassium, Low Phosphorus; Texture: Regular Texture (IDDSI 7); Fluid Consistency: Thin (IDDSI 0)  Diet Effective Now,   Status:  Canceled         11/10/23 2317    11/10/23 2318  Inpatient Cardiology Consult  Once        Specialty:  Cardiology  Provider:  Christina Carvalho MD    11/10/23 2317    11/10/23 2317  Pharmacy to dose warfarin  Continuous PRN         11/10/23 2317    11/10/23 2317  sodium chloride 0.9 % flush 10 mL  As Needed         11/10/23 2317    11/10/23 2317  sodium chloride 0.9 % infusion 40 mL  As Needed         11/10/23 2317    11/10/23 2317  polyethylene glycol (MIRALAX) packet 17 g  Daily PRN        See [facility] for full Linked Orders Report.    11/10/23 2317    11/10/23 2317  bisacodyl (DULCOLAX) EC tablet 5 mg  Daily PRN        See [facility] for full Linked Orders Report.    11/10/23 2317    11/10/23 2317  bisacodyl (DULCOLAX) suppository 10 mg  Daily PRN        See [facility] for full Linked Orders Report.    11/10/23 2317    11/10/23 2317  nitroglycerin (NITROSTAT) SL tablet 0.4 mg  Every 5 Minutes PRN         11/10/23 2317    11/10/23 2258  Urinalysis With Culture If Indicated - Indwelling Urethral Catheter  Once        Comments: Use existing specimen if able      11/10/23 2259    11/10/23 2104  Hemoglobin A1c  STAT         11/10/23 2103    11/10/23 2103  Inpatient Admission  Once         11/10/23 2104    11/10/23 2103  Code Status and Medical Interventions:  Continuous         11/10/23 2104    11/10/23 2059  Urinalysis, Microscopic Only - Urine, Clean Catch  Once         11/10/23 2058    11/10/23 2056  Single High Sensitivity Troponin T  Once         11/10/23 2055    11/10/23 2056  Single High Sensitivity Troponin T  Once         11/10/23 2055     11/10/23 2042  furosemide (LASIX) injection 60 mg  Once         11/10/23 2026    11/10/23 2026  Protime-INR  STAT         11/10/23 2025    11/10/23 1844  ECG 12 Lead Dyspnea  Once         11/10/23 1844    11/10/23 1833  XR Chest 1 View  1 Time Imaging         11/10/23 1832    11/10/23 1831  CBC & Differential  Once         11/10/23 1831    11/10/23 1831  Comprehensive Metabolic Panel  Once         11/10/23 1831    11/10/23 1831  Urinalysis With Culture If Indicated - Urine, Clean Catch  Once         11/10/23 1831    11/10/23 1831  Sedimentation Rate  Once         11/10/23 1831    11/10/23 1831  C-reactive Protein  Once         11/10/23 1831    11/10/23 1831  BNP  Once         11/10/23 1831    11/10/23 1831  CBC Auto Differential  PROCEDURE ONCE         11/10/23 1831    Unscheduled  Up With Assistance  As Needed       11/10/23 2317    --  albuterol sulfate  (90 Base) MCG/ACT inhaler  Every 6 Hours PRN         11/10/23 2101    --  furosemide (LASIX) 20 MG tablet  Daily         11/10/23 2102    --  metoprolol tartrate (LOPRESSOR) 25 MG tablet  2 Times Daily         11/10/23 2104    --  ondansetron (ZOFRAN) 4 MG tablet  Every 12 Hours PRN         11/10/23 2104    --  potassium chloride (MICRO-K) 10 MEQ CR capsule  Daily         11/10/23 2104    --  sacubitril-valsartan (Entresto) 49-51 MG tablet  2 Times Daily,   Status:  Discontinued         11/10/23 2104    --  sertraline (ZOLOFT) 50 MG tablet  Every Night at Bedtime         11/10/23 2104    --  warfarin (COUMADIN) 4 MG tablet  Every Night at Bedtime         11/10/23 2104    --  warfarin (COUMADIN) 1 MG tablet  Daily Warfarin         11/10/23 2104    --  aspirin 81 MG chewable tablet  Daily         11/11/23 0113    --  docusate calcium (SURFAK) 240 MG capsule  Daily         11/11/23 0113    --  melatonin 5 MG tablet tablet  Nightly         11/11/23 0113    --  sacubitril-valsartan (Entresto) 49-51 MG tablet  2 Times Daily         11/11/23 0120    --   acetaminophen (TYLENOL) 500 MG tablet  Every 6 Hours PRN         11/11/23 0120    --  benzonatate (TESSALON) 100 MG capsule  3 Times Daily PRN         11/11/23 0120    --  lactulose (CHRONULAC) 10 GM/15ML solution  Daily PRN         11/11/23 0120                  Physician Progress Notes (last 24 hours)  Notes from 11/10/23 0910 through 11/11/23 0910   No notes of this type exist for this encounter.       Consult Notes (last 24 hours)  Notes from 11/10/23 0910 through 11/11/23 0910   No notes of this type exist for this encounter.

## 2023-11-11 NOTE — CONSULTS
[facility]  CONSULT  NOTE      Patient information:  Date of Admit: 11/10/2023  Date of Consult: 11/11/23  Hospitalist/Referring MD:Silas Barba MD;   PCP: Benito Navarro MD  MRN:  [id number]  Visit Number:  [id number]    LOS: 1  CODE STATUS:  Code Status and Medical Interventions:   Ordered at: 11/10/23 2104     Code Status (Patient has no pulse and is not breathing):    CPR (Attempt to Resuscitate)     Medical Interventions (Patient has pulse or is breathing):    Full Support       PROBLEM LIST: Principal Problem:    Acute on chronic combined systolic and diastolic CHF (congestive heart failure)        Assessment    [Covering for Dr. De La Cruz]  Acute on chronic HFpEF  Chronic atrial fibrillation, rate controlled, [id number] score of at least 5  Cardiology status post remote PCI of the LAD  Status post Biotronik pacemaker implantation for sick sinus syndrome  CKD  Essential hypertension  Dyslipidemia  Chronic anticoagulation  Advanced age  Severe pulm hypertension  Elevated high-sensitivity troponin with flat trend  Anemia          Recommendations   1.  We will try to diurese to keep her negative by at least 1 L/day, so far had excellent diuresis we will monitor creatinine level  2.  Continue anticoagulation for thromboembolic prophylaxis  3.  Pacemaker can be followed up as an outpatient she is due to have pacemaker interrogation next month  4.  Elevated high-sensitivity troponin consistent with NSTEMI type II secondary to acute CHF        Reason for Cardiology consultation: Acute on chronic CHF    Subjective Data   ADMISSION INFORMATION:  Chief Complaint   Patient presents with    Abnormal Lab    Shortness of Breath     Abnormal Lab  Associated symptoms include a fever and weakness. Pertinent negatives include no chest pain, fatigue or headaches.   Shortness of Breath  Associated symptoms include a fever and leg swelling. Pertinent negatives include  "no chest pain, headaches or wheezing.       Tre Kinney is a 89 y.o. female with a past medical history significant for hypertension, hyperlipidemia, chronic kidney disease, coronary artery disease s/p stenting, SSS s/p pacemaker placement, chronic diastolic CHF, iron deficiency anemia and history of blood clots. Patient presented to [facility]) emergency room (ER) on 11/10/2023 with complaints of shortness of breath and leg swelling.  Upon arrival to the ER patient reported that she had became more short of breath yesterday.  Patient also reported increased swelling of her lower extremities.  In the ER patient also reported that she did have episodes of intermittent chest pain that she described as pressure.  Initial labs in the ER HS troponin 42 repeat 44, proBNP 27,093, sodium 141, potassium 4.9, chloride 105, CO2 24.8, BUN 41, creatinine 2.47, hemoglobin 8.8, hematocrit 29.9 ,WBCs 5.30, platelets 212.  Cardiology has been consulted for further evaluation and management.     Primary Cardiologist has been Dr. De La Cruz and she was last seen in the office on 6/12/2023.     Patient is in room 304 A and was examined by Dr. Carvalho.  Patient sitting up in bed eating breakfast.  No signs of acute distress noted. Patient denies any chest pain, shortness of breath, palpitations.  Patient does report that she is \"tired\".       Known medications given enroute vis EMS and in the ER:         Cardiac risk factors:arteriosclerotic heart disease, hypercholesterolemia, hypertension, and Obesity      Last Echo: Results for orders placed during the hospital encounter of 09/19/22    Adult Transthoracic Echo Complete W/ Cont if Necessary Per Protocol    Interpretation Summary  · Left ventricular ejection fraction appears to be 51 - 55%. Left ventricular systolic function is low normal.  · Left ventricular wall thickness is consistent with mild concentric hypertrophy.  · Left ventricular diastolic function was " indeterminate.  · The right ventricular cavity is moderately dilated.  · Moderately reduced right ventricular systolic function noted.  · The left atrial cavity is moderate to severely dilated  · The right atrial cavity is moderately dilated.  · Moderate tricuspid valve regurgitation is present.  · Estimated right ventricular systolic pressure from tricuspid regurgitation is markedly elevated (>55 mmHg).  · Severe pulmonary hypertension is present.  · Electronic lead present in the ventricle  · There is no evidence of pericardial effusion         Last Stress: Results for orders placed during the hospital encounter of 17    SCANNED - NUCLEAR STRESS        Last Cath:                           Past Medical History:   Diagnosis Date    Anemia     Arthritis     CHF (congestive heart failure)     Chronic kidney disease     Coronary artery disease     H/O blood clots     History of transfusion     no reaction     Hypertension      Past Surgical History:   Procedure Laterality Date    ABDOMINAL SURGERY      APPENDECTOMY      CARDIAC SURGERY       SECTION      COLONOSCOPY N/A 3/7/2018    Procedure: COLONOSCOPY;  Surgeon: Willie Gresham MD;  Location:  [facility];  Service:     CORONARY ANGIOPLASTY WITH STENT PLACEMENT      ENDOSCOPY N/A 3/6/2018    Procedure: ESOPHAGOGASTRODUODENOSCOPY;  Surgeon: Willie Gresham MD;  Location:  [facility];  Service:     PACEMAKER IMPLANTATION      left side     SKIN BIOPSY      TUBAL ABDOMINAL LIGATION       Family History   Problem Relation Age of Onset    Other Mother         cardiac disorder    Hypertension Mother     Heart disease Mother     Thyroid disease Mother     Other Father         cardiac disorder    Heart disease Father     Hypertension Father     Other Sister         cardiac disorder    Other Brother         cardiac disorder    Other Other         cardiac disorder    Other Other         cardiac disorder     Social History     Tobacco Use    Smoking  status: Never   Vaping Use    Vaping Use: Never used   Substance Use Topics    Alcohol use: No    Drug use: No       Medications listed below are reported home medications pulling from within the system:  Medications Prior to Admission   Medication Sig Dispense Refill Last Dose    aspirin 81 MG chewable tablet Chew 1 tablet Daily.   11/10/2023 at 0800    docusate calcium (SURFAK) 240 MG capsule Take 1 capsule by mouth Daily.   11/10/2023 at 0800    furosemide (LASIX) 20 MG tablet Take 3 tablets by mouth Daily.   11/10/2023 at 0800    gabapentin (NEURONTIN) 400 MG capsule Take 1 capsule by mouth Every Night.   11/10/2023 at 2000    HYDROcodone-acetaminophen (NORCO) 7.5-325 MG per tablet Take 1 tablet by mouth Every 8 (Eight) Hours As Needed for Moderate Pain.   11/10/2023 at 0758    isosorbide mononitrate (IMDUR) 60 MG 24 hr tablet Take 1 tablet by mouth Daily.   11/10/2023 at 0800    melatonin 5 MG tablet tablet Take 2 tablets by mouth Every Night.   11/10/2023 at 2000    metoprolol tartrate (LOPRESSOR) 25 MG tablet Take 1 tablet by mouth 2 (Two) Times a Day.   11/10/2023 at 2000    ondansetron (ZOFRAN) 4 MG tablet Take 1 tablet by mouth Every 12 (Twelve) Hours As Needed for Nausea or Vomiting.   11/4/2023 at 0032    pantoprazole (PROTONIX) 40 MG EC tablet Take 1 tablet by mouth Daily.   11/10/2023 at 0800    potassium chloride (MICRO-K) 10 MEQ CR capsule Take 1 capsule by mouth Daily.   11/10/2023 at 0800    sacubitril-valsartan (Entresto) 49-51 MG tablet Take 1 tablet by mouth 2 (Two) Times a Day.   11/10/2023 at 1600    sertraline (ZOLOFT) 50 MG tablet Take 1 tablet by mouth every night at bedtime.   11/10/2023 at 2000    warfarin (COUMADIN) 1 MG tablet Take 0.5 tablets by mouth Daily.   11/10/2023 at 2000    warfarin (COUMADIN) 4 MG tablet Take 1 tablet by mouth every night at bedtime.   11/10/2023 at 2000    acetaminophen (TYLENOL) 500 MG tablet Take 1 tablet by mouth Every 6 (Six) Hours As Needed for Mild  Pain.   Unknown    albuterol sulfate  (90 Base) MCG/ACT inhaler Inhale 2 puffs Every 6 (Six) Hours As Needed for Wheezing.   11/6/2023 at 2203    benzonatate (TESSALON) 100 MG capsule Take 1 capsule by mouth 3 (Three) Times a Day As Needed for Cough.   11/5/2023 at 2204    lactulose (CHRONULAC) 10 GM/15ML solution Take 30 mL by mouth Daily As Needed (constipation).   Unknown     Allergies:  Adhesive tape and Xanax [alprazolam]    Review of Systems   Constitutional:  Positive for fever. Negative for fatigue.   Respiratory:  Positive for shortness of breath. Negative for chest tightness and wheezing.    Cardiovascular:  Positive for leg swelling. Negative for chest pain and palpitations.   Neurological:  Positive for weakness. Negative for dizziness, syncope, light-headedness and headaches.       Objective Data      Vital Signs  Temp:  [97.9 °F (36.6 °C)-98.7 °F (37.1 °C)] 98.7 °F (37.1 °C)  Heart Rate:  [72-90] 72  Resp:  [20] 20  BP: (138-178)/(68-89) 165/75  Vital Signs (last 72 hrs)         11/08 0700 11/09 0659 11/09 0700  11/10 0659 11/10 0700  11/11 0659 11/11 0700  11/11 0859   Most Recent      Temp (°F)     97.9 -  98.7       98.7 (37.1) 11/11 0647    Heart Rate     72 -  90       72 11/11 0647    Resp       20       20 11/11 0647    BP     138/73 -  178/82       165/75 11/11 0647    SpO2 (%)     96 -  98       97 11/11 0647          Body mass index is 30.25 kg/m².    Intake/Output Summary (Last 24 hours) at 11/11/2023 0859  Last data filed at 11/11/2023 0500  Gross per 24 hour   Intake --   Output 2200 ml   Net -2200 ml         Physical Exam  Vitals reviewed.   Constitutional:       General: She is awake. She is not in acute distress.     Appearance: Normal appearance. She is well-developed and well-groomed. She is obese.   HENT:      Head: Normocephalic.      Mouth/Throat:      Mouth: Mucous membranes are moist.   Eyes:      Pupils: Pupils are equal, round, and reactive to light.   Neck:       Vascular: No carotid bruit or JVD.   Cardiovascular:      Rate and Rhythm: Normal rate and regular rhythm.      Pulses: Normal pulses.      Heart sounds: Normal heart sounds. No murmur heard.     No S3 or S4 sounds.   Pulmonary:      Effort: Pulmonary effort is normal. No respiratory distress.      Breath sounds: Normal breath sounds. No wheezing, rhonchi or rales.      Comments: Crackles noted bilateral bases  Abdominal:      General: Bowel sounds are normal.      Palpations: Abdomen is soft.      Tenderness: There is no abdominal tenderness.   Musculoskeletal:      Cervical back: Normal range of motion.      Right lower le+ Pitting No edema.      Left lower le+ Pitting No edema.   Skin:     General: Skin is warm and dry.      Capillary Refill: Capillary refill takes less than 2 seconds.   Neurological:      Mental Status: She is alert and oriented to person, place, and time. Mental status is at baseline.   Psychiatric:         Mood and Affect: Mood normal.         Speech: Speech normal.         Behavior: Behavior is cooperative.         Results review   Results Review:    I have reviewed the patient's new clinical results.  Results from last 7 days   Lab Units 11/10/23  2136 11/10/23  1854   HSTROP T ng/L 44* 42*     Results from last 7 days   Lab Units 23  0316 11/10/23  1854   WBC 10*3/mm3 6.07 5.30   HEMOGLOBIN g/dL 8.5* 8.8*   PLATELETS 10*3/mm3 216 212     Results from last 7 days   Lab Units 23  0316 11/10/23  1854   SODIUM mmol/L 141 141   POTASSIUM mmol/L 4.4 4.9   CHLORIDE mmol/L 104 105   CO2 mmol/L 24.2 24.8   BUN mg/dL 40* 41*   CREATININE mg/dL 2.50* 2.47*   CALCIUM mg/dL 8.5 8.7   GLUCOSE mg/dL 102* 158*   ALT (SGPT) U/L 6 8   AST (SGOT) U/L 11 12     Lab Results   Component Value Date    INR 2.10 (H) 11/10/2023    INR 2.25 (H) 10/29/2023    INR 2.74 (H) 2023    INR 1.28 (H) 2023    INR 1.29 (H) 2023    INR 1.08 2022    INR 1.08 2022         Lab  "Results   Component Value Date    MG 2.1 2022    MG 1.8 2022    MG 2.0 2022     Lab Results   Component Value Date    TSH 3.030 10/07/2022      No results found for: \"CHOL\", \"TRIG\", \"HDL\", \"LDL\"  Lab Results   Component Value Date    PROBNP 27,093.0 (H) 11/10/2023    PROBNP 24,828.0 (H) 11/10/2023    PROBNP 19,387.0 (H) 10/29/2023     No results found.  No results found for: \"URICACID\"  Pain Management Panel  More data exists         Latest Ref Rng & Units 2022   Pain Management Panel   Creatinine, Urine mg/dL 110.9  48.9  48.9      Microbiology Results (last 10 days)       ** No results found for the last 240 hours. **           No results found for: \"BLOODCX\"        EC/10/2023 at 1846      ECG/EMG Results (last 24 hours)       Procedure Component Value Units Date/Time    ECG 12 Lead Dyspnea [[id number]] Collected: 11/10/23 1846     Updated: 11/10/23 1847     QT Interval 456 ms      QTC Interval 525 ms     Narrative:      Test Reason : Dyspnea  Blood Pressure :   */*   mmHG  Vent. Rate :  80 BPM     Atrial Rate :  80 BPM     P-R Int :   * ms          QRS Dur : 194 ms      QT Int : 456 ms       P-R-T Axes :   * -76  97 degrees     QTc Int : 525 ms    Ventricular-paced rhythm  Abnormal ECG  When compared with ECG of 29-OCT-2023 10:39,  No significant change was found    Referred By: NUNU           Confirmed By:             TELEMETRY:     Paced 70s      RADIOLOGY STUDIES:  Imaging Results (Last 72 Hours)       Procedure Component Value Units Date/Time    XR Chest 1 View [[id number]] Collected: 11/10/23 2012     Updated: 11/10/23 2016    Narrative:      PROCEDURE: Portable chest x-ray examination performed on November 10,  2023. Single view. Upright position.     HISTORY: Shortness of breath.     FINDINGS:     Enlarged heart size.  Left-sided pacemaker with leads to the right atrium and right ventricle.  Coarsened bronchovascular pattern to the lungs.  Small right and left " pleural effusion.  Trace interstitial edema.  No pneumothorax.  No free air in the upper abdomen.  Mild degenerative disc disease throughout the thoracic spine.       Impression:         1.  Enlarged heart size.  2.  Left-sided pacemaker with leads to the right atrium and right  ventricle.  3.  Small right and left pleural effusion.  4.  Coarsened bronchovascular pattern to the lungs.  5.  Left lower lobe atelectasis.  6.  Trace interstitial edema.     This report was finalized on 11/10/2023 8:14 PM by Bubba Snow MD.               CURRENT MEDICATIONS:  Current list of medications may not reflect those currently placed in orders that are not signed or are being held.     aspirin, 81 mg, Oral, Daily  furosemide, 40 mg, Intravenous, Daily  gabapentin, 400 mg, Oral, Nightly  isosorbide mononitrate, 60 mg, Oral, Daily  melatonin, 10 mg, Oral, Nightly  metoprolol tartrate, 25 mg, Oral, BID  pantoprazole, 40 mg, Oral, Daily  sacubitril-valsartan, 1 tablet, Oral, BID  senna-docusate sodium, 2 tablet, Oral, BID  sertraline, 50 mg, Oral, Nightly  sodium chloride, 10 mL, Intravenous, Q12H      Pharmacy to dose warfarin,         acetaminophen    senna-docusate sodium **AND** polyethylene glycol **AND** bisacodyl **AND** bisacodyl    HYDROcodone-acetaminophen    lactulose    nitroglycerin    Pharmacy to dose warfarin    sodium chloride    sodium chloride      Thank you very much for asking us to be involved in this patient's care.  We will follow along with you.    I have discussed the patients findings and my recommendations with patient.            Electronically signed by LANCE Sotelo, 11/11/23, 11:08 AM EST.   Electronically signed by Christina Carvalho MD, 11/11/23, 11:11 AM EST.              Please note that portions of this note were copied and has been reviewed and is accurate as of 11/11/2023 .      Please note that portions of this note were completed with a voice recognition program.

## 2023-11-11 NOTE — H&P
[facility]  HISTORY & PHYSICAL    Patient Identification:  Name:  Tre Kinney  Age:  89 y.o.  Sex:  female  :  1932  MRN:  [id number]   Visit Number:  [id number]  Admit Date: 11/10/2023   Primary Care Physician:  Beniot Navarro MD     Subjective     Chief complaint:   Chief Complaint   Patient presents with    Abnormal Lab    Shortness of Breath     History of presenting illness:   Patient is a 89 y.o. female with past medical history significant for  CHF, chronic kidney disease, coronary artery disease s/p stenting, history of blood clots, chronic anticoagulation with Coumadin, SSS s/p pacemaker placement, chronic diastolic CHF, and iron deficiency anemia  that presented to the [facility] emergency department for evaluation of shortness of breath and leg swelling.    Patient examined at bedside in ED.  Patient states she notably became more short of breath yesterday.  States her legs have been getting progressively more swollen as well.  She does also admit to chest pain that she describes as pressure, and slight dysuria.  She states the chest pain is intermittent.  She also notes she is feeling generally unwell.  Patient states they placed a Rodriguez cath prior to her leaving the nursing home due to giving her Lasix.  States she normally does not need a catheter.    Upon arrival to the ED, vitals were temperature 97.9, HR 81, RR 20, /70, SPO2 96% on room air.  Labs significant for proBNP 27,093, initial high-sensitivity troponin 42, repeat 44.  Patent 2.47, BUN 41, GFR 18.1.  INR 2.1.  Hemoglobin 8.8, hematocrit 29.9.  UA with moderate blood, 6-10 RBC, trace bacteria.    CXR shows enlarged heart size, left-sided pacemaker, small right and left pleural effusion, coarsened bronchovascular pattern, left lower lobe atelectasis, trace interstitial edema.    Patient has been admitted to the telemetry  unit.  ---------------------------------------------------------------------------------------------------------------------   Review of Systems   Constitutional:  Positive for fatigue. Negative for chills, diaphoresis and fever.   Respiratory:  Positive for shortness of breath. Negative for cough and wheezing.    Cardiovascular:  Positive for chest pain and leg swelling. Negative for palpitations.   Gastrointestinal:  Negative for abdominal pain, constipation, diarrhea, nausea and vomiting.   Genitourinary:  Positive for dysuria. Negative for difficulty urinating and flank pain.   Musculoskeletal:  Positive for myalgias (legs). Negative for arthralgias and neck stiffness.   Skin:  Negative for rash and wound.   Neurological:  Negative for dizziness, weakness and light-headedness.   Psychiatric/Behavioral:  Negative for agitation and confusion.       ---------------------------------------------------------------------------------------------------------------------   Past Medical History:   Diagnosis Date    Anemia     Arthritis     CHF (congestive heart failure)     Chronic kidney disease     Coronary artery disease     H/O blood clots     History of transfusion     no reaction     Hypertension      Past Surgical History:   Procedure Laterality Date    ABDOMINAL SURGERY      APPENDECTOMY      CARDIAC SURGERY       SECTION      COLONOSCOPY N/A 3/7/2018    Procedure: COLONOSCOPY;  Surgeon: Willie Gresham MD;  Location:  [facility];  Service:     CORONARY ANGIOPLASTY WITH STENT PLACEMENT      ENDOSCOPY N/A 3/6/2018    Procedure: ESOPHAGOGASTRODUODENOSCOPY;  Surgeon: Willie Gresham MD;  Location:  [facility] END[facility];  Service:     PACEMAKER IMPLANTATION      left side     SKIN BIOPSY      TUBAL ABDOMINAL LIGATION       Family History   Problem Relation Age of Onset    Other Mother         cardiac disorder    Hypertension Mother     Heart disease Mother     Thyroid disease Mother     Other Father          cardiac disorder    Heart disease Father     Hypertension Father     Other Sister         cardiac disorder    Other Brother         cardiac disorder    Other Other         cardiac disorder    Other Other         cardiac disorder     Social History     Socioeconomic History    Marital status:    Tobacco Use    Smoking status: Never   Vaping Use    Vaping Use: Never used   Substance and Sexual Activity    Alcohol use: No    Drug use: No    Sexual activity: Defer     ---------------------------------------------------------------------------------------------------------------------   Allergies:  Adhesive tape and Xanax [alprazolam]  ---------------------------------------------------------------------------------------------------------------------   Medications below are reported home medications pulling from within the system; at this time, these medications have not been reconciled unless otherwise specified and are in the verification process for further verifcation as current home medications.    Prior to Admission Medications       Prescriptions Last Dose Informant Patient Reported? Taking?    albuterol sulfate  (90 Base) MCG/ACT inhaler   Yes Yes    Inhale 2 puffs Every 4 (Four) Hours As Needed for Wheezing.    furosemide (LASIX) 20 MG tablet   Yes Yes    Take 1 tablet by mouth 3 (Three) Times a Day.    gabapentin (NEURONTIN) 400 MG capsule  Self Yes Yes    Take 1 capsule by mouth Every Night.    HYDROcodone-acetaminophen (NORCO) 7.5-325 MG per tablet  Self Yes Yes    Take 1 tablet by mouth Every 6 (Six) Hours As Needed for Moderate Pain.    isosorbide mononitrate (IMDUR) 60 MG 24 hr tablet  Self Yes Yes    Take 1 tablet by mouth Daily.    metoprolol tartrate (LOPRESSOR) 25 MG tablet   Yes Yes    Take 1 tablet by mouth 2 (Two) Times a Day.    ondansetron (ZOFRAN) 4 MG tablet   Yes Yes    Take 1 tablet by mouth Every 12 (Twelve) Hours As Needed for Nausea or Vomiting.    pantoprazole (PROTONIX) 40  MG EC tablet  Self Yes Yes    Take 1 tablet by mouth Daily.    potassium chloride (MICRO-K) 10 MEQ CR capsule   Yes Yes    Take 1 capsule by mouth Daily.    sacubitril-valsartan (Entresto) 49-51 MG tablet   Yes Yes    Take 1 tablet by mouth 2 (Two) Times a Day.    sertraline (ZOLOFT) 50 MG tablet   Yes Yes    Take 1 tablet by mouth Daily.    warfarin (COUMADIN) 1 MG tablet   Yes Yes    Take 1 tablet by mouth Daily.    warfarin (COUMADIN) 4 MG tablet   Yes Yes    Take 1 tablet by mouth Daily.          ---------------------------------------------------------------------------------------------------------------------    Objective     Hospital Scheduled Meds:      No current facility-administered medications for this encounter.      Current listed hospital scheduled medications may not yet reflect those currently placed in orders that are signed and held, awaiting patient's arrival to floor/unit.    ---------------------------------------------------------------------------------------------------------------------   Vital Signs:  Temp:  [97.9 °F (36.6 °C)-98.4 °F (36.9 °C)] 98.4 °F (36.9 °C)  Heart Rate:  [73-90] 77  Resp:  [20] 20  BP: (138-178)/(68-89) 178/82  Mean Arterial Pressure (Non-Invasive) for the past 24 hrs (Last 3 readings):   Noninvasive MAP (mmHg)   11/10/23 2222 106   11/10/23 1900 89   11/10/23 1840 95     SpO2 Percentage    11/10/23 1844 11/10/23 1900 11/10/23 2222   SpO2: 96% 98% 97%     SpO2:  [96 %-98 %] 97 %  on   ;   Device (Oxygen Therapy): room air    Body mass index is 30.25 kg/m².  Wt Readings from Last 3 Encounters:   11/10/23 87.6 kg (193 lb 3.2 oz)   10/29/23 65.8 kg (145 lb)   07/18/23 66.7 kg (147 lb)     ---------------------------------------------------------------------------------------------------------------------   Physical Exam:  Physical Exam  Constitutional:       General: She is not in acute distress.     Appearance: Normal appearance.   HENT:      Head: Normocephalic and  atraumatic.      Right Ear: External ear normal.      Left Ear: External ear normal.      Nose: No nasal deformity.      Mouth/Throat:      Lips: Pink.      Mouth: Mucous membranes are moist.   Eyes:      Conjunctiva/sclera: Conjunctivae normal.      Pupils: Pupils are equal, round, and reactive to light.   Cardiovascular:      Rate and Rhythm: Normal rate and regular rhythm.      Heart sounds: Normal heart sounds.   Pulmonary:      Effort: Pulmonary effort is normal.      Breath sounds: Normal breath sounds. No wheezing, rhonchi or rales.   Abdominal:      General: Abdomen is flat. Bowel sounds are normal.      Palpations: Abdomen is soft.      Tenderness: There is no guarding or rebound.   Genitourinary:     Comments: No swan catheter in place   Musculoskeletal:      Cervical back: Neck supple. Normal range of motion.      Right lower le+ Pitting Edema present.      Left lower le+ Pitting Edema present.   Skin:     General: Skin is warm and dry.   Neurological:      General: No focal deficit present.      Mental Status: She is alert and oriented to person, place, and time.   Psychiatric:         Mood and Affect: Mood normal.         Behavior: Behavior normal.       ---------------------------------------------------------------------------------------------------------------------  EKG: Regular paced rhythm, heart rate 80.      Telemetry:        I have personally reviewed the EKG/Telemetry strip  ---------------------------------------------------------------------------------------------------------------------   Results from last 7 days   Lab Units 11/10/23  2136 11/10/23  1854   HSTROP T ng/L 44* 42*     Results from last 7 days   Lab Units 11/10/23  1854 11/10/23  1612   PROBNP pg/mL 27,093.0* 24,828.0*         Results from last 7 days   Lab Units 11/10/23  2136 11/10/23  1854   CRP mg/dL  --  <0.30   WBC 10*3/mm3  --  5.30   HEMOGLOBIN g/dL  --  8.8*   HEMATOCRIT %  --  29.9*   MCV fL  --  85.4  "  MCHC g/dL  --  29.4*   PLATELETS 10*3/mm3  --  212   INR  2.10*  --      Results from last 7 days   Lab Units 11/10/23  1854   SODIUM mmol/L 141   POTASSIUM mmol/L 4.9   CHLORIDE mmol/L 105   CO2 mmol/L 24.8   BUN mg/dL 41*   CREATININE mg/dL 2.47*   CALCIUM mg/dL 8.7   GLUCOSE mg/dL 158*   ALBUMIN g/dL 3.6   BILIRUBIN mg/dL 0.4   ALK PHOS U/L 68   AST (SGOT) U/L 12   ALT (SGPT) U/L 8   Estimated Creatinine Clearance: 17.2 mL/min (A) (by C-G formula based on SCr of 2.47 mg/dL (H)).  No results found for: \"AMMONIA\"    Hemoglobin A1C   Date/Time Value Ref Range Status   11/10/2023 1854 5.50 4.80 - 5.60 % Final     Lab Results   Component Value Date    HGBA1C 5.50 11/10/2023     Lab Results   Component Value Date    TSH 3.030 10/07/2022       No results found for: \"BLOODCX\"  No results found for: \"URINECX\"  No results found for: \"WOUNDCX\"  No results found for: \"STOOLCX\"  No results found for: \"RESPCX\"  No results found for: \"MRSACX\"  Pain Management Panel  More data exists         Latest Ref Rng & Units 9/20/2022 7/24/2019   Pain Management Panel   Creatinine, Urine mg/dL 110.9  48.9  48.9      I have personally reviewed the above laboratory results.   ---------------------------------------------------------------------------------------------------------------------  Imaging Results (Last 7 Days)       Procedure Component Value Units Date/Time    XR Chest 1 View [[id number]] Collected: 11/10/23 2012     Updated: 11/10/23 2016    Narrative:      PROCEDURE: Portable chest x-ray examination performed on November 10,  2023. Single view. Upright position.     HISTORY: Shortness of breath.     FINDINGS:     Enlarged heart size.  Left-sided pacemaker with leads to the right atrium and right ventricle.  Coarsened bronchovascular pattern to the lungs.  Small right and left pleural effusion.  Trace interstitial edema.  No pneumothorax.  No free air in the upper abdomen.  Mild degenerative disc disease throughout the thoracic " spine.       Impression:         1.  Enlarged heart size.  2.  Left-sided pacemaker with leads to the right atrium and right  ventricle.  3.  Small right and left pleural effusion.  4.  Coarsened bronchovascular pattern to the lungs.  5.  Left lower lobe atelectasis.  6.  Trace interstitial edema.     This report was finalized on 11/10/2023 8:14 PM by Bubba Snow MD.             I have personally reviewed the above radiology results.     Last Echocardiogram:  Results for orders placed during the hospital encounter of 09/19/22    Adult Transthoracic Echo Complete W/ Cont if Necessary Per Protocol    Interpretation Summary  · Left ventricular ejection fraction appears to be 51 - 55%. Left ventricular systolic function is low normal.  · Left ventricular wall thickness is consistent with mild concentric hypertrophy.  · Left ventricular diastolic function was indeterminate.  · The right ventricular cavity is moderately dilated.  · Moderately reduced right ventricular systolic function noted.  · The left atrial cavity is moderate to severely dilated  · The right atrial cavity is moderately dilated.  · Moderate tricuspid valve regurgitation is present.  · Estimated right ventricular systolic pressure from tricuspid regurgitation is markedly elevated (>55 mmHg).  · Severe pulmonary hypertension is present.  · Electronic lead present in the ventricle  · There is no evidence of pericardial effusion    ---------------------------------------------------------------------------------------------------------------------    Assessment & Plan      Active Hospital Problems    Diagnosis  POA    **Acute on chronic combined systolic and diastolic CHF (congestive heart failure) [I50.43]  Yes     Acute on chronic combined systolic and diastolic CHF, POA  Chronic Kidney Disease Stage IV  Dysuria  Presented with complaints of worsening shortness of breath and leg swelling  CXR shows cardiomegaly.  Bilateral lower extremity edema on  exam.   Lasix 60 mg IV given in ED.  Monitor urine output and renal response and can further diuresis renally tolerated.  Plans for Lasix 40 mg IV daily for now.  Inpatient cardiology consult, assistance appreciated  Patient presented with Cr 2.47, baseline around 2.2.  Monitor renal function closely due to diuresis  Trend Cr and urine output, avoid nephrotoxins, NSAIDs, dehydration and contrast as able.   Urine culture added due to complaints of dysuria and trace bacteria on UA.  Monitor strict I's and O's and daily weights    CHRONIC MEDICAL PROBLEMS    Essential hypertension  BP appears variably controlled   Plan to resume home antihypertensive regimen once reconciled per pharmacy with appropriate holding parameters to prevent hypotension and/or bradycardia   Closely monitor BP per hospital protocol, titrate medications as necessary    Coronary artery disease s/p stenting,  History of blood clots  Chronic anticoagulation with Coumadin  SSS s/p pacemaker placement  Restart home meds pending med rec  Continuous cardiac monitoring  Monitor for signs and symptoms of bleeding  Monitor INR  Iron deficiency anemia   H&H stable  Baseline hemoglobin is around 9, hemoglobin admission 8.8  Monitor daily CBC    F/E/N: Hydration.  Continue monitor electrolytes.  Cardiac/renal diet.    ---------------------------------------------------  DVT Prophylaxis: Chronically anticoagulated with warfarin  GI Prophylaxis: Bowel regimen  Activity: Up with assistance    The patient is considered to be a high risk patient due to: CHF with acute exacerbation    INPATIENT status due to the need for care which can only be reasonably provided in an hospital setting such as aggressive/expedited ancillary services and/or consultation services, the necessity for IV medications, close physician monitoring and/or the possible need for procedures.  In such, I feel patient’s risk for adverse outcomes and need for care warrant INPATIENT evaluation and  predict the patient’s care encounter to likely last beyond 2 midnights.     Code Status: DNR.  Full support.  Patient would like to further discuss this with palliative care    Disposition/Discharge planning: Pending clinical course, plans to discharge back to nursing home when medically stable    I have discussed the patient's assessment and plan with Dr. Jameson Mireles PA-C  Hospitalist Service -- [facility]       11/10/23  22:57 EST    Attending Physician: Silas Barba MD

## 2023-11-11 NOTE — PROGRESS NOTES
Patient Identification:  Name:  Tre Kinney  Age:  89 y.o.  Sex:  female  :  1932  MRN:  [id number]  Visit Number:  [id number]  Primary Care Provider:  Benito Navarro MD    Length of stay:  1    Subjective/Interval History/Consultants/Procedures     Chief Complaint:   Chief Complaint   Patient presents with    Abnormal Lab    Shortness of Breath       Subjective/Interval History:    89 y.o. female who was admitted on 11/10/2023 with     PMH is significant for CHF, chronic kidney disease, coronary artery disease s/p stenting, history of blood clots, chronic anticoagulation with Coumadin, SSS s/p pacemaker placement, chronic diastolic CHF, and iron deficiency anemia   For complete admission information, please see history and physical.     Consultations:  cardiology   Palliative care    Procedures/Scans:  CXR    Today, the patient was sitting up in no acute distress. No visibly increased work of breathing. She notes she feels much less dyspneic today than on arrival. She notes her feet still feel swollen. She also complains of leg pain though notes this is a chronic issue for her.      Room location at the time of evaluation was [address].    ----------------------------------------------------------------------------------------------------------------------  Current Hospital Meds:  aspirin, 81 mg, Oral, Daily  furosemide, 40 mg, Intravenous, Daily  gabapentin, 400 mg, Oral, Nightly  isosorbide mononitrate, 60 mg, Oral, Daily  melatonin, 10 mg, Oral, Nightly  metoprolol tartrate, 25 mg, Oral, BID  pantoprazole, 40 mg, Oral, Daily  sacubitril-valsartan, 1 tablet, Oral, BID  senna-docusate sodium, 2 tablet, Oral, BID  sertraline, 50 mg, Oral, Nightly  sodium chloride, 10 mL, Intravenous, Q12H  warfarin, 5 mg, Oral, Once      Pharmacy to dose warfarin,       ----------------------------------------------------------------------------------------------------------------------      Objective     Vital  Signs:  Temp:  [97.9 °F (36.6 °C)-98.7 °F (37.1 °C)] 98.7 °F (37.1 °C)  Heart Rate:  [72-90] 72  Resp:  [20] 20  BP: (138-178)/(68-89) 165/75      11/10/23  1836 11/10/23  2222   Weight: 65.8 kg (145 lb) 87.6 kg (193 lb 3.2 oz) (rn notifed)     Body mass index is 30.25 kg/m².    Intake/Output Summary (Last 24 hours) at 11/11/2023 1015  Last data filed at 11/11/2023 0500  Gross per 24 hour   Intake --   Output 2200 ml   Net -2200 ml     No intake/output data recorded.  Diet: Cardiac Diets; Low Sodium (2g); Texture: Soft to Chew (NDD 3); Soft to Chew: Whole Meat; Fluid Consistency: Thin (IDDSI 0)  ----------------------------------------------------------------------------------------------------------------------    Physical Exam  Vitals and nursing note reviewed.   Constitutional:       General: She is not in acute distress.  HENT:      Head: Normocephalic and atraumatic.   Eyes:      Extraocular Movements: Extraocular movements intact.      Conjunctiva/sclera: Conjunctivae normal.   Cardiovascular:      Rate and Rhythm: Normal rate.   Pulmonary:      Effort: Pulmonary effort is normal. No respiratory distress.   Abdominal:      Palpations: Abdomen is soft.   Musculoskeletal:      Right lower leg: Edema present.      Left lower leg: Edema present.      Comments: Trace    Skin:     General: Skin is warm.   Neurological:      Mental Status: She is alert. Mental status is at baseline.   Psychiatric:         Mood and Affect: Mood normal.         Behavior: Behavior normal.            ----------------------------------------------------------------------------------------------------------------------  Tele:      ----------------------------------------------------------------------------------------------------------------------  Results from last 7 days   Lab Units 11/10/23  2136 11/10/23  1854 11/10/23  1612   HSTROP T ng/L 44* 42*  --    PROBNP pg/mL  --  27,093.0* 24,828.0*     Results from last 7 days   Lab Units  "11/11/23  0316 11/10/23  2136 11/10/23  1854   CRP mg/dL  --   --  <0.30   WBC 10*3/mm3 6.07  --  5.30   HEMOGLOBIN g/dL 8.5*  --  8.8*   HEMATOCRIT % 29.3*  --  29.9*   MCV fL 85.2  --  85.4   MCHC g/dL 29.0*  --  29.4*   PLATELETS 10*3/mm3 216  --  212   INR   --  2.10*  --          Results from last 7 days   Lab Units 11/11/23  0316 11/10/23  1854   SODIUM mmol/L 141 141   POTASSIUM mmol/L 4.4 4.9   CHLORIDE mmol/L 104 105   CO2 mmol/L 24.2 24.8   BUN mg/dL 40* 41*   CREATININE mg/dL 2.50* 2.47*   CALCIUM mg/dL 8.5 8.7   GLUCOSE mg/dL 102* 158*   ALBUMIN g/dL 3.4* 3.6   BILIRUBIN mg/dL 0.4 0.4   ALK PHOS U/L 65 68   AST (SGOT) U/L 11 12   ALT (SGPT) U/L 6 8   Estimated Creatinine Clearance: 17 mL/min (A) (by C-G formula based on SCr of 2.5 mg/dL (H)).  No results found for: \"AMMONIA\"      No results found for: \"BLOODCX\"  No results found for: \"URINECX\"  No results found for: \"WOUNDCX\"  No results found for: \"STOOLCX\"  ----------------------------------------------------------------------------------------------------------------------  Imaging Results (Last 24 Hours)       Procedure Component Value Units Date/Time    XR Chest 1 View [[id number]] Collected: 11/10/23 2012     Updated: 11/10/23 2016    Narrative:      PROCEDURE: Portable chest x-ray examination performed on November 10,  2023. Single view. Upright position.     HISTORY: Shortness of breath.     FINDINGS:     Enlarged heart size.  Left-sided pacemaker with leads to the right atrium and right ventricle.  Coarsened bronchovascular pattern to the lungs.  Small right and left pleural effusion.  Trace interstitial edema.  No pneumothorax.  No free air in the upper abdomen.  Mild degenerative disc disease throughout the thoracic spine.       Impression:         1.  Enlarged heart size.  2.  Left-sided pacemaker with leads to the right atrium and right  ventricle.  3.  Small right and left pleural effusion.  4.  Coarsened bronchovascular pattern to the " lungs.  5.  Left lower lobe atelectasis.  6.  Trace interstitial edema.     This report was finalized on 11/10/2023 8:14 PM by Bubba Snow MD.             ----------------------------------------------------------------------------------------------------------------------   I have reviewed the above laboratory values for 11/11/23    Assessment/Plan     Active Hospital Problems    Diagnosis  POA    **Acute on chronic combined systolic and diastolic CHF (congestive heart failure) [I50.43]  Yes         ASSESSMENT/PLAN:    Acute on chronic combined systolic and diastolic heart failure  NSTEMI, type II due to above  Complicated by CKD stage IV  Patient presented from local SNF 2/2 shortness of breath and leg swelling. BNP more elevated than baseline at 27K. Troponin also slightly more elevated than baseline at 42 and 44.   Imaging noted small bilateral pleural effusions and interstitial edema.   Cardiology consulted for further assistance. Recommended to keep net negative by 1L per day  Continuing 40 mg IV lasix daily for now  TTE ordered and pending  Monitor renal function closely.   Daily weights/Is and Os   Home regimen continued including entresto, metoprolol tartrate, ASA, Imdur  Continue to provide supportive care.     Chronic:   CAD s/p stenting   Hx DVT? PE? Chronically anticoagulated with Warfarin   SSS s/p pacemaker   MINDA  Continue regimen as indicated   Monitor INR. Pharmacy to assist with dosing warfarin. Assistance appreciated.  -----------  -DVT prophylaxis: on warfarin   -Disposition plans/anticipated needs: anticipate return to SNF following completion of work up and clinical stabilization        The patient is high risk due to the following diagnoses/reasons:  acute on chronic CHF        Ankit Tai PA-C  11/11/23  10:15 EST

## 2023-11-12 ENCOUNTER — APPOINTMENT (OUTPATIENT)
Dept: CARDIOLOGY | Facility: HOSPITAL | Age: 88
DRG: 281 | End: 2023-11-12
Payer: MEDICARE

## 2023-11-12 LAB
ANION GAP SERPL CALCULATED.3IONS-SCNC: 10.3 MMOL/L (ref 5–15)
BH CV ECHO MEAS - AO MAX PG: 8.6 MMHG
BH CV ECHO MEAS - AO MEAN PG: 4 MMHG
BH CV ECHO MEAS - AO ROOT DIAM: 2.6 CM
BH CV ECHO MEAS - AO V2 MAX: 147 CM/SEC
BH CV ECHO MEAS - AO V2 VTI: 25.2 CM
BH CV ECHO MEAS - EDV(CUBED): 125 ML
BH CV ECHO MEAS - EDV(MOD-SP4): 60.1 ML
BH CV ECHO MEAS - EF(MOD-SP4): 60.9 %
BH CV ECHO MEAS - ESV(CUBED): 59.3 ML
BH CV ECHO MEAS - ESV(MOD-SP4): 23.5 ML
BH CV ECHO MEAS - FS: 22 %
BH CV ECHO MEAS - IVS/LVPW: 1.07 CM
BH CV ECHO MEAS - IVSD: 1.5 CM
BH CV ECHO MEAS - LA DIMENSION: 5.5 CM
BH CV ECHO MEAS - LAT PEAK E' VEL: 8.6 CM/SEC
BH CV ECHO MEAS - LV DIASTOLIC VOL/BSA (35-75): 30.4 CM2
BH CV ECHO MEAS - LV MASS(C)D: 306.8 GRAMS
BH CV ECHO MEAS - LV SYSTOLIC VOL/BSA (12-30): 11.9 CM2
BH CV ECHO MEAS - LVIDD: 5 CM
BH CV ECHO MEAS - LVIDS: 3.9 CM
BH CV ECHO MEAS - LVOT AREA: 3.1 CM2
BH CV ECHO MEAS - LVOT DIAM: 2 CM
BH CV ECHO MEAS - LVPWD: 1.4 CM
BH CV ECHO MEAS - MED PEAK E' VEL: 6 CM/SEC
BH CV ECHO MEAS - MV A MAX VEL: 25.2 CM/SEC
BH CV ECHO MEAS - MV E MAX VEL: 83.9 CM/SEC
BH CV ECHO MEAS - MV E/A: 3.3
BH CV ECHO MEAS - PA ACC TIME: 0.06 SEC
BH CV ECHO MEAS - RAP SYSTOLE: 10 MMHG
BH CV ECHO MEAS - RVSP: 83.3 MMHG
BH CV ECHO MEAS - SI(MOD-SP4): 18.5 ML/M2
BH CV ECHO MEAS - SV(MOD-SP4): 36.6 ML
BH CV ECHO MEAS - TAPSE (>1.6): 1.38 CM
BH CV ECHO MEAS - TR MAX PG: 73.3 MMHG
BH CV ECHO MEAS - TR MAX VEL: 428 CM/SEC
BH CV ECHO MEASUREMENTS AVERAGE E/E' RATIO: 11.49
BUN SERPL-MCNC: 38 MG/DL (ref 8–23)
BUN/CREAT SERPL: 16 (ref 7–25)
CALCIUM SPEC-SCNC: 8.5 MG/DL (ref 8.2–9.6)
CHLORIDE SERPL-SCNC: 104 MMOL/L (ref 98–107)
CO2 SERPL-SCNC: 23.7 MMOL/L (ref 22–29)
CREAT SERPL-MCNC: 2.38 MG/DL (ref 0.57–1)
DEPRECATED RDW RBC AUTO: 55.5 FL (ref 37–54)
EGFRCR SERPLBLD CKD-EPI 2021: 18.9 ML/MIN/1.73
ERYTHROCYTE [DISTWIDTH] IN BLOOD BY AUTOMATED COUNT: 18.2 % (ref 12.3–15.4)
GLUCOSE SERPL-MCNC: 107 MG/DL (ref 65–99)
HCT VFR BLD AUTO: 26.5 % (ref 34–46.6)
HGB BLD-MCNC: 8.2 G/DL (ref 12–15.9)
INR PPP: 1.9 (ref 0.9–1.1)
LEFT ATRIUM VOLUME INDEX: 68.2 ML/M2
MCH RBC QN AUTO: 25.9 PG (ref 26.6–33)
MCHC RBC AUTO-ENTMCNC: 30.9 G/DL (ref 31.5–35.7)
MCV RBC AUTO: 83.6 FL (ref 79–97)
PLATELET # BLD AUTO: 180 10*3/MM3 (ref 140–450)
PMV BLD AUTO: 9.8 FL (ref 6–12)
POTASSIUM SERPL-SCNC: 4.2 MMOL/L (ref 3.5–5.2)
PROTHROMBIN TIME: 22.5 SECONDS (ref 12.1–14.7)
QT INTERVAL: 386 MS
QTC INTERVAL: 431 MS
RBC # BLD AUTO: 3.17 10*6/MM3 (ref 3.77–5.28)
SODIUM SERPL-SCNC: 138 MMOL/L (ref 136–145)
WBC NRBC COR # BLD: 5.55 10*3/MM3 (ref 3.4–10.8)

## 2023-11-12 PROCEDURE — 93306 TTE W/DOPPLER COMPLETE: CPT | Performed by: SPECIALIST

## 2023-11-12 PROCEDURE — 80048 BASIC METABOLIC PNL TOTAL CA: CPT

## 2023-11-12 PROCEDURE — 99232 SBSQ HOSP IP/OBS MODERATE 35: CPT | Performed by: SPECIALIST

## 2023-11-12 PROCEDURE — 85610 PROTHROMBIN TIME: CPT | Performed by: INTERNAL MEDICINE

## 2023-11-12 PROCEDURE — 93306 TTE W/DOPPLER COMPLETE: CPT

## 2023-11-12 PROCEDURE — 85027 COMPLETE CBC AUTOMATED: CPT

## 2023-11-12 PROCEDURE — 25010000002 FUROSEMIDE PER 20 MG: Performed by: HOSPITALIST

## 2023-11-12 PROCEDURE — 99231 SBSQ HOSP IP/OBS SF/LOW 25: CPT

## 2023-11-12 RX ORDER — WARFARIN SODIUM 7.5 MG/1
7.5 TABLET ORAL
Status: COMPLETED | OUTPATIENT
Start: 2023-11-12 | End: 2023-11-12

## 2023-11-12 RX ADMIN — HYDROCODONE BITARTRATE AND ACETAMINOPHEN 1 TABLET: 7.5; 325 TABLET ORAL at 20:30

## 2023-11-12 RX ADMIN — ISOSORBIDE MONONITRATE 60 MG: 30 TABLET, EXTENDED RELEASE ORAL at 09:46

## 2023-11-12 RX ADMIN — FUROSEMIDE 40 MG: 10 INJECTION, SOLUTION INTRAMUSCULAR; INTRAVENOUS at 09:47

## 2023-11-12 RX ADMIN — Medication 10 ML: at 09:47

## 2023-11-12 RX ADMIN — WARFARIN SODIUM 7.5 MG: 7.5 TABLET ORAL at 16:55

## 2023-11-12 RX ADMIN — METOPROLOL TARTRATE 25 MG: 25 TABLET, FILM COATED ORAL at 09:47

## 2023-11-12 RX ADMIN — PANTOPRAZOLE SODIUM 40 MG: 40 TABLET, DELAYED RELEASE ORAL at 09:47

## 2023-11-12 RX ADMIN — GABAPENTIN 400 MG: 400 CAPSULE ORAL at 20:30

## 2023-11-12 RX ADMIN — SERTRALINE 50 MG: 50 TABLET, FILM COATED ORAL at 20:30

## 2023-11-12 RX ADMIN — Medication 10 ML: at 20:30

## 2023-11-12 RX ADMIN — ASPIRIN 81 MG: 81 TABLET, CHEWABLE ORAL at 09:46

## 2023-11-12 RX ADMIN — SACUBITRIL AND VALSARTAN 1 TABLET: 49; 51 TABLET, FILM COATED ORAL at 09:46

## 2023-11-12 RX ADMIN — DOCUSATE SODIUM 50 MG AND SENNOSIDES 8.6 MG 2 TABLET: 8.6; 5 TABLET, FILM COATED ORAL at 20:30

## 2023-11-12 RX ADMIN — METOPROLOL TARTRATE 25 MG: 25 TABLET, FILM COATED ORAL at 20:30

## 2023-11-12 RX ADMIN — HYDROCODONE BITARTRATE AND ACETAMINOPHEN 1 TABLET: 7.5; 325 TABLET ORAL at 07:36

## 2023-11-12 RX ADMIN — DOCUSATE SODIUM 50 MG AND SENNOSIDES 8.6 MG 2 TABLET: 8.6; 5 TABLET, FILM COATED ORAL at 09:46

## 2023-11-12 RX ADMIN — SACUBITRIL AND VALSARTAN 1 TABLET: 49; 51 TABLET, FILM COATED ORAL at 20:30

## 2023-11-12 RX ADMIN — Medication 10 MG: at 20:30

## 2023-11-12 NOTE — PLAN OF CARE
Goal Outcome Evaluation:  Plan of Care Reviewed With: patient        Progress: improving          Patient resting in bed at this time currently voicing no complaints or concerns. Patient has been pleasant and cooperative with care this shift. No s/s of acute distress noted at this time. Will continue with plan of care.

## 2023-11-12 NOTE — PROGRESS NOTES
[facility]  PROGRESS NOTE      Patient information:  Name: Tre Kinney  Age/Sex: 89 y.o. female  :  1932        PCP: Benito Navarro MD  Attending: Silas Barba MD  MRN:  [id number]  Visit Number:  [id number]    LOS:  LOS: 2 days   CODE STATUS:    Code Status and Medical Interventions:   Ordered at: 11/10/23 2104     Code Status (Patient has no pulse and is not breathing):    CPR (Attempt to Resuscitate)     Medical Interventions (Patient has pulse or is breathing):    Full Support       PROBLEM LIST:Principal Problem:    Acute on chronic combined systolic and diastolic CHF (congestive heart failure)      Reason for Cardiology follow-up: Acute on chronic CHF    Subjective   ADMISSION INFORMATION:  Chief Complaint   Patient presents with    Abnormal Lab    Shortness of Breath       HPI:  Tre Kinney is a 89 y.o. female with a past medical history significant for hypertension, hyperlipidemia, chronic kidney disease, coronary artery disease s/p stenting, SSS s/p pacemaker placement, chronic diastolic CHF, iron deficiency anemia and history of blood clots. Patient presented to [facility] ([facility]) emergency room (ER) on 11/10/2023 with complaints of shortness of breath and leg swelling.  Upon arrival to the ER patient reported that she had became more short of breath yesterday.  Patient also reported increased swelling of her lower extremities.  In the ER patient also reported that she did have episodes of intermittent chest pain that she described as pressure.  Initial labs in the ER HS troponin 42 repeat 44, proBNP 27,093, sodium 141, potassium 4.9, chloride 105, CO2 24.8, BUN 41, creatinine 2.47, hemoglobin 8.8, hematocrit 29.9 ,WBCs 5.30, platelets 212.  Cardiology has been consulted for further evaluation and management.      Primary Cardiologist has been Dr. De La Cruz and she was last seen in the office on 2023.        Interval  History:   Patient is in room 304 A and was examined by Dr. Carvalho. Patient lying in bed resting quietly.  No signs of acute distress noted.  Denies shortness of breath.    Vital Signs  Temp:  [98.1 °F (36.7 °C)-98.8 °F (37.1 °C)] 98.1 °F (36.7 °C)  Heart Rate:  [77-90] 88  Resp:  [18] 18  BP: (145-170)/() 170/102  Vital Signs (last 72 hrs)         11/09 0700  11/10 0659 11/10 0700 11/11 0659 11/11 0700 11/12 0659 11/12 0700  11/12 0814   Most Recent      Temp (°F)   97.9 -  98.7    98.1 -  98.8       98.1 (36.7) 11/12 0300    Heart Rate   72 -  90    77 -  90       88 11/12 0300    Resp     20      18       18 11/12 0300    BP   138/73 -  178/82    145/69 -  159/89      170/102     170/102  Comment: per aid jonathan 11/12 0730    SpO2 (%)   96 -  98    97 -  99       97 11/12 0300          Body mass index is 29.83 kg/m².    Intake/Output Summary (Last 24 hours) at 11/12/2023 0814  Last data filed at 11/12/2023 0300  Gross per 24 hour   Intake 790 ml   Output 2650 ml   Net -1860 ml       Objective     Physical Exam:      General Appearance:    Alert, cooperative, in no acute distress.   Head:    Normocephalic, without obvious abnormality, atraumatic.   Eyes:                          Conjunctivae and sclerae normal, no icterus, no pallor, corneas clear.   Neck:   No adenopathy, supple, trachea midline, no thyromegaly, no carotid bruit, no JVD.   Lungs:     Clear to auscultation, respirations regular, even and             unlabored.    Heart:    Regular rhythm and normal rate, normal S1 and S2, no gallop, no rub, no click   Chest Wall:    No abnormalities observed.   Abdomen:     Normal bowel sounds, no masses, no organomegaly, soft nontender, nondistended, no guarding, no rebound tenderness.   Extremities:   Moves all extremities well, no cyanosis, no redness.  1+ edema noted bilateral lower extremity   Pulses:   Pulses palpable and equal bilaterally.   Skin:   No bleeding, bruising or rash.   Neurologic:   Alert  "and Oriented x 3, Speech Clear & comprehensive.       Results review   Results Review:   Results from last 7 days   Lab Units 11/12/23  0133 11/11/23  0316 11/10/23  1854   WBC 10*3/mm3 5.55 6.07 5.30   HEMOGLOBIN g/dL 8.2* 8.5* 8.8*   PLATELETS 10*3/mm3 180 216 212     Results from last 7 days   Lab Units 11/12/23  0133 11/11/23  0316 11/10/23  1854   SODIUM mmol/L 138 141 141   POTASSIUM mmol/L 4.2 4.4 4.9   CHLORIDE mmol/L 104 104 105   CO2 mmol/L 23.7 24.2 24.8   BUN mg/dL 38* 40* 41*   CREATININE mg/dL 2.38* 2.50* 2.47*   CALCIUM mg/dL 8.5 8.5 8.7   GLUCOSE mg/dL 107* 102* 158*   ALT (SGPT) U/L  --  6 8   AST (SGOT) U/L  --  11 12     Results from last 7 days   Lab Units 11/10/23  2136 11/10/23  1854   HSTROP T ng/L 44* 42*     Lab Results   Component Value Date    PROBNP 27,093.0 (H) 11/10/2023    PROBNP 24,828.0 (H) 11/10/2023    PROBNP 19,387.0 (H) 10/29/2023     No results found.  Lab Results   Component Value Date    ABSOLUTELUNG 33 12/22/2022     Lab Results   Component Value Date    INR 1.90 (H) 11/12/2023    INR 2.10 (H) 11/10/2023    INR 2.25 (H) 10/29/2023    INR 2.74 (H) 08/25/2023    INR 1.28 (H) 07/14/2023    INR 1.29 (H) 01/13/2023    INR 1.08 09/24/2022     Lab Results   Component Value Date    MG 2.1 12/19/2022    MG 1.8 12/14/2022    MG 2.0 12/01/2022     Lab Results   Component Value Date    TSH 3.030 10/07/2022      No results found for: \"CHOL\", \"TRIG\", \"HDL\", \"LDL\"  Pain Management Panel  More data exists         Latest Ref Rng & Units 9/20/2022 7/24/2019   Pain Management Panel   Creatinine, Urine mg/dL 110.9  48.9  48.9      Microbiology Results (last 10 days)       ** No results found for the last 240 hours. **           Imaging Results (Last 24 Hours)       ** No results found for the last 24 hours. **            ECHO:  Results for orders placed during the hospital encounter of 09/19/22    Adult Transthoracic Echo Complete W/ Cont if Necessary Per Protocol    Interpretation Summary  · " Left ventricular ejection fraction appears to be 51 - 55%. Left ventricular systolic function is low normal.  · Left ventricular wall thickness is consistent with mild concentric hypertrophy.  · Left ventricular diastolic function was indeterminate.  · The right ventricular cavity is moderately dilated.  · Moderately reduced right ventricular systolic function noted.  · The left atrial cavity is moderate to severely dilated  · The right atrial cavity is moderately dilated.  · Moderate tricuspid valve regurgitation is present.  · Estimated right ventricular systolic pressure from tricuspid regurgitation is markedly elevated (>55 mmHg).  · Severe pulmonary hypertension is present.  · Electronic lead present in the ventricle  · There is no evidence of pericardial effusion      STRESS TEST:  Results for orders placed during the hospital encounter of 17    SCANNED - NUCLEAR STRESS       HEART CATH:  No results found for this or any previous visit.      [id number] at 2300      TELEMETRY:     Paced rhythm 70s        I reviewed the patient's new clinical results.    Medication Review:   Current list of medications may not reflect those currently placed in orders that are not signed or are being held.     aspirin, 81 mg, Oral, Daily  furosemide, 40 mg, Intravenous, Daily  gabapentin, 400 mg, Oral, Nightly  isosorbide mononitrate, 60 mg, Oral, Daily  melatonin, 10 mg, Oral, Nightly  metoprolol tartrate, 25 mg, Oral, BID  pantoprazole, 40 mg, Oral, Daily  sacubitril-valsartan, 1 tablet, Oral, BID  senna-docusate sodium, 2 tablet, Oral, BID  sertraline, 50 mg, Oral, Nightly  sodium chloride, 10 mL, Intravenous, Q12H      Pharmacy to dose warfarin,         acetaminophen    senna-docusate sodium **AND** polyethylene glycol **AND** bisacodyl **AND** bisacodyl    HYDROcodone-acetaminophen    lactulose    nitroglycerin    Pharmacy to dose warfarin    sodium chloride    sodium chloride    Assessment    [Covering for   Jono]  Acute on chronic HFpEF  Chronic atrial fibrillation, rate controlled, [id number] score of at least 5  Cardiology status post remote PCI of the LAD  Status post Biotronik pacemaker implantation for sick sinus syndrome  CKD  Essential hypertension  Dyslipidemia  Chronic anticoagulation  Advanced age  Severe pulm hypertension  Elevated high-sensitivity troponin with flat trend  Anemia                  Plan   1.  Patient with excellent diuresis overnight continue with IV diuretics, creatinine slightly improved  2.  Continue anticoagulation for thromboembolic prophylaxis                Thank you very much for asking us to be involved in this patient's care.  We will follow along with you.     I have discussed the patients findings and my recommendations with patient.          Electronically signed by LANCE Sotelo, 11/12/23, 11:01 AM EST.   Electronically signed by Christina Carvalho MD, 11/12/23, 11:05 AM EST.            Please note that portions of this note were completed with a voice recognition program.    Please note that portions of this note were copied and has been reviewed and is accurate as of 11/12/2023 .

## 2023-11-12 NOTE — PLAN OF CARE
Goal Outcome Evaluation:  Plan of Care Reviewed With: patient        Progress: improving  Outcome Evaluation: Pt resting in bed during assessment; Pt CO anxiety and pain; PRN meds given: VSS; Will cont. POC

## 2023-11-12 NOTE — PROGRESS NOTES
Patient Identification:  Name:  Tre Kinney  Age:  89 y.o.  Sex:  female  :  1932  MRN:  [id number]  Visit Number:  [id number]  Primary Care Provider:  Benito Navarro MD    Length of stay:  2    Subjective/Interval History/Consultants/Procedures     Chief Complaint:   Chief Complaint   Patient presents with    Abnormal Lab    Shortness of Breath       Subjective/Interval History:    89 y.o. female who was admitted on 11/10/2023 with acute on chronic combined CHF    PMH is significant for  CHF, chronic kidney disease, coronary artery disease s/p stenting, history of blood clots, chronic anticoagulation with Coumadin, SSS s/p pacemaker placement, chronic diastolic CHF, and iron deficiency anemia    For complete admission information, please see history and physical.     Consultations:  cardiology   Palliative care    Procedures/Scans:  CXR  TTE    Today, the patient sitting up in no distress. Reports overall continues to feel improved from time of admission. Complaining of some back pain but repositioned by staff and that seems to have helped some. Also has some abdominal pain this morning, since resolved but states she thinks she may be constipated. Continue scheduled bowel regimen.      Room location at the time of evaluation was [facility].    ----------------------------------------------------------------------------------------------------------------------  Current Hospital Meds:  aspirin, 81 mg, Oral, Daily  furosemide, 40 mg, Intravenous, Daily  gabapentin, 400 mg, Oral, Nightly  isosorbide mononitrate, 60 mg, Oral, Daily  melatonin, 10 mg, Oral, Nightly  metoprolol tartrate, 25 mg, Oral, BID  pantoprazole, 40 mg, Oral, Daily  sacubitril-valsartan, 1 tablet, Oral, BID  senna-docusate sodium, 2 tablet, Oral, BID  sertraline, 50 mg, Oral, Nightly  sodium chloride, 10 mL, Intravenous, Q12H      Pharmacy to dose warfarin,        ----------------------------------------------------------------------------------------------------------------------      Objective     Vital Signs:  Temp:  [98.1 °F (36.7 °C)-98.8 °F (37.1 °C)] 98.1 °F (36.7 °C)  Heart Rate:  [77-90] 88  Resp:  [18] 18  BP: (145-170)/() 170/102      11/10/23  1836 11/10/23  2222 11/12/23  0500   Weight: 65.8 kg (145 lb) 87.6 kg (193 lb 3.2 oz) (rn notifed) 86.4 kg (190 lb 8 oz)     Body mass index is 29.83 kg/m².    Intake/Output Summary (Last 24 hours) at 11/12/2023 0741  Last data filed at 11/12/2023 0300  Gross per 24 hour   Intake 790 ml   Output 2650 ml   Net -1860 ml     No intake/output data recorded.  Diet: Cardiac Diets; Low Sodium (2g); Texture: Soft to Chew (NDD 3); Soft to Chew: Whole Meat; Fluid Consistency: Thin (IDDSI 0)  ----------------------------------------------------------------------------------------------------------------------    Physical Exam  Vitals and nursing note reviewed.   Constitutional:       General: She is not in acute distress.  HENT:      Head: Normocephalic and atraumatic.   Eyes:      Extraocular Movements: Extraocular movements intact.      Conjunctiva/sclera: Conjunctivae normal.   Cardiovascular:      Rate and Rhythm: Normal rate and regular rhythm.   Pulmonary:      Effort: Pulmonary effort is normal.      Breath sounds: Normal breath sounds.   Abdominal:      Palpations: Abdomen is soft.   Musculoskeletal:      Right lower leg: No edema.      Left lower leg: No edema.   Skin:     General: Skin is warm and dry.   Neurological:      Mental Status: She is alert. Mental status is at baseline.   Psychiatric:         Mood and Affect: Mood normal.         Behavior: Behavior normal.                ----------------------------------------------------------------------------------------------------------------------  Tele:   "    ----------------------------------------------------------------------------------------------------------------------  Results from last 7 days   Lab Units 11/10/23  2136 11/10/23  1854 11/10/23  1612   HSTROP T ng/L 44* 42*  --    PROBNP pg/mL  --  27,093.0* 24,828.0*     Results from last 7 days   Lab Units 11/12/23  0133 11/11/23  0316 11/10/23  2136 11/10/23  1854   CRP mg/dL  --   --   --  <0.30   WBC 10*3/mm3 5.55 6.07  --  5.30   HEMOGLOBIN g/dL 8.2* 8.5*  --  8.8*   HEMATOCRIT % 26.5* 29.3*  --  29.9*   MCV fL 83.6 85.2  --  85.4   MCHC g/dL 30.9* 29.0*  --  29.4*   PLATELETS 10*3/mm3 180 216  --  212   INR  1.90*  --  2.10*  --          Results from last 7 days   Lab Units 11/12/23  0133 11/11/23  0316 11/10/23  1854   SODIUM mmol/L 138 141 141   POTASSIUM mmol/L 4.2 4.4 4.9   CHLORIDE mmol/L 104 104 105   CO2 mmol/L 23.7 24.2 24.8   BUN mg/dL 38* 40* 41*   CREATININE mg/dL 2.38* 2.50* 2.47*   CALCIUM mg/dL 8.5 8.5 8.7   GLUCOSE mg/dL 107* 102* 158*   ALBUMIN g/dL  --  3.4* 3.6   BILIRUBIN mg/dL  --  0.4 0.4   ALK PHOS U/L  --  65 68   AST (SGOT) U/L  --  11 12   ALT (SGPT) U/L  --  6 8   Estimated Creatinine Clearance: 17.7 mL/min (A) (by C-G formula based on SCr of 2.38 mg/dL (H)).  No results found for: \"AMMONIA\"      No results found for: \"BLOODCX\"  No results found for: \"URINECX\"  No results found for: \"WOUNDCX\"  No results found for: \"STOOLCX\"  ----------------------------------------------------------------------------------------------------------------------  Imaging Results (Last 24 Hours)       ** No results found for the last 24 hours. **          ----------------------------------------------------------------------------------------------------------------------   I have reviewed the above laboratory values for 11/12/23    Assessment/Plan     Active Hospital Problems    Diagnosis  POA    **Acute on chronic combined systolic and diastolic CHF (congestive heart failure) [I50.43]  Yes "         ASSESSMENT/PLAN:    Acute on chronic combined systolic and diastolic heart failure, now clinically compensated  NSTEMI, type II due to above  Complicated by CKD stage IV  Patient presented from local SNF 2/2 shortness of breath and leg swelling. BNP more elevated than baseline at 27K. Troponin also slightly more elevated than baseline at 42 and 44.   Imaging noted small bilateral pleural effusions and interstitial edema.   Cardiology consulted for further assistance. Recommended to keep net negative by 1L per day  Continuing 40 mg IV lasix daily for now- can likely transition to PO soon.   Has continued to diurese well. Neg negative nearly 4L for admission  Stable on room air  TTE- normal LVEF, grade II diastolic dysfunction. MR, TR.  Monitor renal function closely. Creatine stable, improved from admission.  Daily weights/Is and Os   Home regimen continued including entresto, metoprolol tartrate, ASA, Imdur  Continue to provide supportive care.   Follow up any further cardiology recommendations.     Chronic:   CAD s/p stenting   Hx DVT? PE? Chronically anticoagulated with Warfarin   SSS s/p pacemaker   MINDA  Continue regimen as indicated   Monitor INR. Pharmacy to assist with dosing warfarin. Assistance appreciated.    -----------  -DVT prophylaxis: on warfarin   -Disposition plans/anticipated needs:Pending course. Can likely return to SNF 24-48 hours.         The patient is high risk due to the following diagnoses/reasons:  acute on chronic heart failure, advanced age        Ankit Tai PA-C  11/12/23  07:41 EST

## 2023-11-13 VITALS
RESPIRATION RATE: 18 BRPM | OXYGEN SATURATION: 94 % | HEIGHT: 67 IN | BODY MASS INDEX: 30.13 KG/M2 | SYSTOLIC BLOOD PRESSURE: 130 MMHG | DIASTOLIC BLOOD PRESSURE: 60 MMHG | WEIGHT: 192 LBS | TEMPERATURE: 98.4 F | HEART RATE: 79 BPM

## 2023-11-13 PROBLEM — I50.43 ACUTE ON CHRONIC COMBINED SYSTOLIC AND DIASTOLIC CHF (CONGESTIVE HEART FAILURE): Status: RESOLVED | Noted: 2023-11-10 | Resolved: 2023-11-13

## 2023-11-13 LAB
ANION GAP SERPL CALCULATED.3IONS-SCNC: 9.2 MMOL/L (ref 5–15)
BUN SERPL-MCNC: 39 MG/DL (ref 8–23)
BUN/CREAT SERPL: 17 (ref 7–25)
CALCIUM SPEC-SCNC: 8.4 MG/DL (ref 8.2–9.6)
CHLORIDE SERPL-SCNC: 105 MMOL/L (ref 98–107)
CO2 SERPL-SCNC: 24.8 MMOL/L (ref 22–29)
CREAT SERPL-MCNC: 2.3 MG/DL (ref 0.57–1)
EGFRCR SERPLBLD CKD-EPI 2021: 19.7 ML/MIN/1.73
GLUCOSE SERPL-MCNC: 105 MG/DL (ref 65–99)
INR PPP: 2.23 (ref 0.9–1.1)
POTASSIUM SERPL-SCNC: 4.5 MMOL/L (ref 3.5–5.2)
PROTHROMBIN TIME: 25.4 SECONDS (ref 12.1–14.7)
SODIUM SERPL-SCNC: 139 MMOL/L (ref 136–145)

## 2023-11-13 PROCEDURE — 25010000002 FUROSEMIDE PER 20 MG: Performed by: HOSPITALIST

## 2023-11-13 PROCEDURE — 97162 PT EVAL MOD COMPLEX 30 MIN: CPT

## 2023-11-13 PROCEDURE — 99238 HOSP IP/OBS DSCHRG MGMT 30/<: CPT

## 2023-11-13 PROCEDURE — 80048 BASIC METABOLIC PNL TOTAL CA: CPT

## 2023-11-13 PROCEDURE — 85610 PROTHROMBIN TIME: CPT | Performed by: INTERNAL MEDICINE

## 2023-11-13 RX ORDER — FUROSEMIDE 40 MG/1
40 TABLET ORAL DAILY
Qty: 30 TABLET | Refills: 0 | Status: SHIPPED | OUTPATIENT
Start: 2023-11-13

## 2023-11-13 RX ORDER — WARFARIN SODIUM 4 MG/1
4 TABLET ORAL
Status: DISCONTINUED | OUTPATIENT
Start: 2023-11-13 | End: 2023-11-13 | Stop reason: HOSPADM

## 2023-11-13 RX ADMIN — DOCUSATE SODIUM 50 MG AND SENNOSIDES 8.6 MG 2 TABLET: 8.6; 5 TABLET, FILM COATED ORAL at 08:44

## 2023-11-13 RX ADMIN — Medication 10 ML: at 08:45

## 2023-11-13 RX ADMIN — ISOSORBIDE MONONITRATE 60 MG: 30 TABLET, EXTENDED RELEASE ORAL at 08:44

## 2023-11-13 RX ADMIN — PANTOPRAZOLE SODIUM 40 MG: 40 TABLET, DELAYED RELEASE ORAL at 08:44

## 2023-11-13 RX ADMIN — METOPROLOL TARTRATE 25 MG: 25 TABLET, FILM COATED ORAL at 08:44

## 2023-11-13 RX ADMIN — SACUBITRIL AND VALSARTAN 1 TABLET: 49; 51 TABLET, FILM COATED ORAL at 08:44

## 2023-11-13 RX ADMIN — FUROSEMIDE 40 MG: 10 INJECTION, SOLUTION INTRAMUSCULAR; INTRAVENOUS at 08:44

## 2023-11-13 RX ADMIN — ASPIRIN 81 MG: 81 TABLET, CHEWABLE ORAL at 08:44

## 2023-11-13 NOTE — THERAPY EVALUATION
Acute Care - Physical Therapy Initial Evaluation   [place]     Patient Name: Tre Kinney  : 1932  MRN: [id number]  Today's Date: 2023   Onset of Illness/Injury or Date of Surgery: 11/10/23  Visit Dx:     ICD-10-CM ICD-9-CM   1. Acute on chronic congestive heart failure, unspecified heart failure type  I50.9 428.0     Patient Active Problem List   Diagnosis    Acid reflux    Ankle arthralgia    Cardiac conduction disorder    Closed fracture of distal fibula    Arteriosclerosis of coronary artery    Fracture of distal end of tibia    Elevated cholesterol    BP (high blood pressure)    Hypertrophic polyarthritis    OP (osteoporosis)    Right heart failure    Heart failure, chronic, right-sided    SOB (shortness of breath)    Palpitations    CHF (congestive heart failure)    Congestive heart failure    Precordial pain    Chronic anticoagulation    Hypokalemia    Anemia, unspecified type    Generalized weakness     Past Medical History:   Diagnosis Date    Anemia     Arthritis     CHF (congestive heart failure)     Chronic kidney disease     Coronary artery disease     H/O blood clots     History of transfusion     no reaction     Hypertension      Past Surgical History:   Procedure Laterality Date    ABDOMINAL SURGERY      APPENDECTOMY      CARDIAC SURGERY       SECTION      COLONOSCOPY N/A 3/7/2018    Procedure: COLONOSCOPY;  Surgeon: Willie Gresham MD;  Location:  [facility];  Service:     CORONARY ANGIOPLASTY WITH STENT PLACEMENT      ENDOSCOPY N/A 3/6/2018    Procedure: ESOPHAGOGASTRODUODENOSCOPY;  Surgeon: Willie Gresham MD;  Location:  [facility];  Service:     PACEMAKER IMPLANTATION      left side     SKIN BIOPSY      TUBAL ABDOMINAL LIGATION       PT Assessment (last 12 hours)       PT Evaluation and Treatment       Row Name 23 1333          Physical Therapy Time and Intention    Subjective Information complains of;weakness;fatigue  -KP     Document Type evaluation  -      Mode of Treatment individual therapy;physical therapy  -     Patient Effort good  -     Symptoms Noted During/After Treatment fatigue  -     Comment Patient reports she was receiving therapy at the SNF but had stopped a few weeks ago.  She was ambulating in the hallways a few weeks ago but recently was only transfering to the commode.  -       Row Name 11/13/23 1332          General Information    Patient Profile Reviewed yes  -     Onset of Illness/Injury or Date of Surgery 11/10/23  -     Referring Physician Jameson  -     Patient Observations alert;cooperative;agree to therapy  -     Prior Level of Function mod assist:;all household mobility;gait;transfer;bed mobility;min assist:  -     Equipment Currently Used at Home wheelchair;walker, rolling;cane, straight;hospital bed  -     Pertinent History of Current Functional Problem acute on chronic heart failure  -     Existing Precautions/Restrictions fall  -       Row Name 11/13/23 1332          Previous Level of Function/Home Environm    Bed Mobility, Premorbid Functional Level partial assistance  -     Transfers, Premorbid Functional Level partial assistance;uses device or equipment  -     Household Ambulation, Premorbid Functional Level partial assistance;uses device or equipment  -       Row Name 11/13/23 9806          Living Environment    Current Living Arrangements residential facility  -     Home Accessibility wheelchair accessible  -     People in Home facility resident  -       Row Name 11/13/23 1339          Home Use of Assistive/Adaptive Equipment    Equipment Currently Used at Home walker, rolling  -       Row Name 11/13/23 1331          Pain    Additional Documentation Pain Scale: FACES Pre/Post-Treatment (Group)  -       Row Name 11/13/23 8184          Pain Scale: FACES Pre/Post-Treatment    Pain: FACES Scale, Pretreatment 2-->hurts little bit  -     Posttreatment Pain Rating 2-->hurts little bit  -      Pain Location - Side/Orientation Bilateral  -     Pain Location - foot  -     Pre/Posttreatment Pain Comment Pt reports some pain on anterior bilateral feet with movement - possibly from swelling  -       Row Name 11/13/23 1333          Cognition    Follows Commands (Cognition) [facility]     Personal Safety Interventions fall prevention program maintained;gait belt;muscle strengthening facilitated;nonskid shoes/slippers when out of bed;supervised activity  -[facility] Name 11/13/23 1333          Range of Motion (ROM)    Range of Motion ROM is [facility] Name 11/13/23 1333          Strength Comprehensive (MMT)    Comment, General Manual Muscle Testing (MMT) Assessment Strength demonstrated 3/5  -[facility] Name 11/13/23 1333          Bed Mobility    Bed Mobility supine-sit;sit-supine  -     Supine-Sit [place] (Bed Mobility) minimum assist (75% patient effort);1 person assist  -     Sit-Supine [place] (Bed Mobility) minimum assist (75% patient effort);1 person assist  -     Bed Mobility, Safety Issues decreased use of arms for pushing/pulling;decreased use of legs for bridging/pushing  \[facility] [place] [facility]\""     Assistive Device (Bed Mobility) bed rails;draw sheet;head of bed elevated  -[facility] Name 11/13/23 1333          Transfers    Transfers sit-stand transfer;stand-sit transfer  -[facility] Name 11/13/23 1333          Sit-Stand Transfer    Sit-Stand [place] (Transfers) verbal cues;moderate assist (50% patient effort);1 person assist  -     Assistive Device (Sit-Stand Transfers) walker, front-wheeled  [facility] Name 11/13/23 1333          Stand-Sit Transfer    Stand-Sit [place] (Transfers) minimum assist (75% patient effort);1 person assist;verbal cues  -     Assistive Device (Stand-Sit Transfers) walker, front-wheeled  [facility] Name 11/13/23 1333          Gait/Stairs (Locomotion)    Comment, (Gait/Stairs) Pt was fatigued and requested not attempting ambulation this date.   She reports getting up to the bedside chair with nursing staff this morning.  -       Row Name 11/13/23 3668          Balance    Balance Assessment sitting static balance;sitting dynamic balance;standing static balance  -     Static Sitting Balance standby assist;1-person assist  -     Dynamic Sitting Balance contact guard;1-person assist  -     Position, Sitting Balance sitting edge of bed  -     Static Standing Balance contact guard;minimal assist;1-person assist  -     Position/Device Used, Standing Balance walker, front-wheeled  -       Row Name 11/13/23 8354          Plan of Care Review    Plan of Care Reviewed With patient  -     Outcome Evaluation PT evaluation complete.  Patient has recently had a decline in function with inability to ambulate a significant distance and increased assistance needed with transfers.  She will benefit from skilled services during this admission to improve strength, standing balance and overall functional mobility.  -       Row Name 11/13/23 8256          Positioning and Restraints    Pre-Treatment Position in bed  -     Post Treatment Position bed  -KP     In Bed notified nsg;call light within reach;encouraged to call for assist;exit alarm on;side rails up x3;side lying left  Lines in tact and needs in reach  -       Row Name 11/13/23 9627          Therapy Assessment/Plan (PT)    Patient/Family Therapy Goals Statement (PT) Imrpove transfers and ambulation  -     Functional Level at Time of Evaluation (PT) modA  -KP     PT Diagnosis (PT) Decreased strength and balance  -     Rehab Potential (PT) good, to achieve stated therapy goals  -     Criteria for Skilled Interventions Met (PT) yes;meets criteria;skilled treatment is necessary  -     Therapy Frequency (PT) 2 times/wk  -     Predicted Duration of Therapy Intervention (PT) 2 wks  -     Problem List (PT) problems related to;balance;coordination;mobility;strength;postural control;range of  motion (ROM)  -KP     Activity Limitations Related to Problem List (PT) unable to transfer safely;unable to ambulate safely  -       Row Name 11/13/23 6151          PT Evaluation Complexity    History, PT Evaluation Complexity 3 or more personal factors and/or comorbidities  -KP     Examination of Body Systems (PT Eval Complexity) total of 3 or more elements  -KP     Clinical Presentation (PT Evaluation Complexity) evolving  -KP     Clinical Decision Making (PT Evaluation Complexity) moderate complexity  -KP     Overall Complexity (PT Evaluation Complexity) moderate complexity  -KP       Row Name 11/13/23 0647          Physical Therapy Goals    Bed Mobility Goal Selection (PT) bed mobility, PT goal 1  -KP     Transfer Goal Selection (PT) transfer, PT goal 1  -KP     Gait Training Goal Selection (PT) gait training, PT goal 1  -       Row Name 11/13/23 4013          Bed Mobility Goal 1 (PT)    Activity/Assistive Device (Bed Mobility Goal 1, PT) sit to supine/supine to sit  -KP     [place] Level/Cues Needed (Bed Mobility Goal 1, PT) standby assist  -KP     Time Frame (Bed Mobility Goal 1, PT) long term goal (LTG);by discharge  -       Row Name 11/13/23 2233          Transfer Goal 1 (PT)    Activity/Assistive Device (Transfer Goal 1, PT) sit-to-stand/stand-to-sit;walker, rolling  -KP     [place] Level/Cues Needed (Transfer Goal 1, PT) standby assist  -KP     Time Frame (Transfer Goal 1, PT) long term goal (LTG);by discharge  -       Row Name 11/13/23 9367          Gait Training Goal 1 (PT)    Activity/Assistive Device (Gait Training Goal 1, PT) gait (walking locomotion);assistive device use;walker, rolling  -KP     [place] Level (Gait Training Goal 1, PT) contact guard required  -KP     Distance (Gait Training Goal 1, PT) 30ft  -KP     Time Frame (Gait Training Goal 1, PT) long term goal (LTG);by discharge  -               User Key  (r) = Recorded By, (t) = Taken By, (c) = Cosigned By       Initials Name Provider Type    Martina Davis PT Physical Therapist                    Physical Therapy Education       Title: PT OT SLP Therapies (In Progress)       Topic: Physical Therapy (Not Started)       Point: Mobility training (Not Started)       Learner Progress:  Not documented in this visit.              Point: Home exercise program (Not Started)       Learner Progress:  Not documented in this visit.              Point: Body mechanics (Not Started)       Learner Progress:  Not documented in this visit.              Point: Precautions (Not Started)       Learner Progress:  Not documented in this visit.                                  PT Recommendation and Plan  Planned Therapy Interventions (PT): balance training, bed mobility training, gait training, home exercise program, motor coordination training, neuromuscular re-education, postural re-education, patient/family education, ROM (range of motion), strengthening, stretching, transfer training  Therapy Frequency (PT): 2 times/wk  Plan of Care Reviewed With: patient  Outcome Evaluation: PT evaluation complete.  Patient has recently had a decline in function with inability to ambulate a significant distance and increased assistance needed with transfers.  She will benefit from skilled services during this admission to improve strength, standing balance and overall functional mobility.       Time Calculation:    PT Charges       Row Name 11/13/23 1402             Time Calculation    PT Received On 11/13/23  -      PT Goal Re-Cert Due Date 11/27/23  -                User Key  (r) = Recorded By, (t) = Taken By, (c) = Cosigned By      Initials Name Provider Type    Martina Davis PT Physical Therapist                  Therapy Charges for Today       Code Description Service Date Service Provider Modifiers Qty    29814932845 HC PT EVAL MOD COMPLEXITY 4 11/13/2023 Martina Blum PT GP 1            PT G-Codes  AM-PAC 6 Clicks Score (PT):  8    Martina Blum, PT  11/13/2023

## 2023-11-13 NOTE — CASE MANAGEMENT/SOCIAL WORK
Discharge Planning Assessment  [facility]     Patient Name: Tre Kinney  MRN: [id number]  Today's Date: 11/13/2023    Admit Date: 11/10/2023    Plan: SS received a consult for advanced age, & discharge planning. Pt was admitted from [facility] on 11/10/23. SS [facility] per [place] who states pt has a 14 day bed hold upon admission. SS to follow and assist with discharge planning.     Discharge Plan       Row Name 11/13/23 0939       Plan    Plan SS received a consult for advanced age, & discharge planning. Pt was admitted from [facility] on 11/10/23. SS contacted [facility] per [place] who states pt has a 14 day bed hold upon admission. SS to follow and assist with discharge planning.    Patient/Family in Agreement with Plan yes          Continued Care and Services - Admitted Since 11/10/2023    Coordination has not been started for this encounter.       Expected Discharge Date and Time       Expected Discharge Date Expected Discharge Time    Nov 13, 2023            Demographic Summary       Row Name 11/13/23 0937       General Information    Admission Type inpatient    Referral Source nursing    Reason for Consult discharge planning  SS received a consult for advanced age, & discharge planning.              XANDER Craig

## 2023-11-13 NOTE — PROGRESS NOTES
"  Patient Identification:  Name:  Tre Kinney  Age:  89 y.o.  Sex:  female  :  1932  MRN:  [id number]  Visit Number:  [id number]  Primary care provider:  Benito Navarro MD    Reason for follow-up:  Acute on chronic heart failure with preserved EF    Subjective     She was evaluated around 8:30 AM today.    89-year-old nursing home resident has been admitted with acute on chronic heart failure with preserved EF.  Received IV Lasix.  Had adequate urinary output with improved symptoms.  Shortness of breath is improved.  Leg edema-resolving.  No history of chest pain or dizziness.    She has history of chronic atrial fibrillation, arteriosclerotic CAD s/p stenting LAD and multiple cardiac risk factors.      Medication Review:   aspirin, 81 mg, Oral, Daily  furosemide, 40 mg, Intravenous, Daily  gabapentin, 400 mg, Oral, Nightly  isosorbide mononitrate, 60 mg, Oral, Daily  melatonin, 10 mg, Oral, Nightly  metoprolol tartrate, 25 mg, Oral, BID  pantoprazole, 40 mg, Oral, Daily  sacubitril-valsartan, 1 tablet, Oral, BID  senna-docusate sodium, 2 tablet, Oral, BID  sertraline, 50 mg, Oral, Nightly  sodium chloride, 10 mL, Intravenous, Q12H  warfarin, 4 mg, Oral, Once      Pharmacy to dose warfarin,            Vital Sign Min/Max for last 24 hours  Temp  Min: 98 °F (36.7 °C)  Max: 98.5 °F (36.9 °C)   BP  Min: 127/60  Max: 136/65   Pulse  Min: 79  Max: 85   Resp  Min: 18  Max: 18   SpO2  Min: 91 %  Max: 94 %   No data recorded   Weight  Min: 87.1 kg (192 lb)  Max: 87.1 kg (192 lb)     Flowsheet Rows      Flowsheet Row First Filed Value   Admission Height 170.2 cm (67\") Documented at 11/10/2023 1836   Admission Weight 65.8 kg (145 lb) Documented at 11/10/2023 1836            Objective       Physical Exam:         General Appearance:  HEENT:   Neck:     Alert, cooperative, in no acute distress  Grossly normal   No JVD    Lungs:   Clear to auscultation,respirations regular, No added sounds     Heart:  Irregular " rhythm.  Normal heart rate.  No murmurs.    Chest Wall:  No abnormalities observed    Abdomen   Soft, nontender, no masses, no organomegaly and bowel sounds are heard.     Extremities: 1+ bilateral pedal edema    Pulses: Pulses palpable and equal bilaterally    Neurologic: No focal deficits         Telemetry:  A-fib with controlled ventricular rate      Labs  Results from last 7 days   Lab Units 11/12/23  0133 11/11/23  0316 11/10/23  1854   WBC 10*3/mm3 5.55 6.07 5.30   HEMOGLOBIN g/dL 8.2* 8.5* 8.8*   HEMATOCRIT % 26.5* 29.3* 29.9*   PLATELETS 10*3/mm3 180 216 212     Results from last 7 days   Lab Units 11/13/23  0101 11/12/23  0133 11/11/23  0316 11/10/23  1854   SODIUM mmol/L 139 138 141 141   POTASSIUM mmol/L 4.5 4.2 4.4 4.9   CHLORIDE mmol/L 105 104 104 105   CO2 mmol/L 24.8 23.7 24.2 24.8   BUN mg/dL 39* 38* 40* 41*   CREATININE mg/dL 2.30* 2.38* 2.50* 2.47*   CALCIUM mg/dL 8.4 8.5 8.5 8.7   GLUCOSE mg/dL 105* 107* 102* 158*     Results from last 7 days   Lab Units 11/11/23  0316 11/10/23  1854   BILIRUBIN mg/dL 0.4 0.4   ALK PHOS U/L 65 68   AST (SGOT) U/L 11 12   ALT (SGPT) U/L 6 8         Results from last 7 days   Lab Units 11/13/23  0101 11/12/23  0133 11/10/23  2136   INR  2.23* 1.90* 2.10*     Results from last 7 days   Lab Units 11/10/23  1854   CRP mg/dL <0.30     Results from last 7 days   Lab Units 11/10/23  2136 11/10/23  1854   HSTROP T ng/L 44* 42*           Assessment      1.  Acute on chronic heart failure with preserved EF.  Clinically improving.  2.  Chronic atrial fibrillation.  Asymptomatic.  Rate is controlled.  3.  ASCVD s/p stent in LAD.  Stable.  No angina.  4.  S/p PPM for SSS  5.  Hypertension-controlled  6.  Hyperlipidemia    Plan     1.  HFpEF-clinically improving.  Continue Lasix at 40 mg p.o. daily for maintenance diuresis.  2.  CAD-stable.  Continue aspirin, atorvastatin, Imdur and metoprolol.  3.  A-fib-rate is controlled.  Continue metoprolol and warfarin with a target INR of  2-3.  4.  SSS-s/p PPM.  Next pacemaker interrogation scheduled 12/2023.      Ngozi De La Cruz MD [facility]  11/13/23  12:38 EST

## 2023-11-13 NOTE — DISCHARGE SUMMARY
[facility] HOSPITALIST DISCHARGE SUMMARY    Patient Identification:  Name:  Tre Kinney  Age:  89 y.o.  Sex:  female  :  1932  MRN:  [id number]  Visit Number:  [id number]    Date of Admission: 11/10/2023  Date of Discharge: 2023    PCP: Benito Navarro MD    Discharging Provider: Julius Tai PA-C / Dr. Ashley    Discharge Diagnoses     Discharge Diagnoses:  Acute on chronic combined systolic and diastolic heart failure, resolved  NSTEMI, type II due to above  Complicated by CKD stage IV    Secondary Diagnoses:  CAD s/p stenting  Hx blood clotting-anticoagulated with warfarin  Sick sinus syndrome s/p pacer  Iron deficiency anemia    Needs on follow up:    Consults/Procedures     Consults:   Consults       Date and Time Order Name Status Description    11/10/2023 11:17 PM Inpatient Cardiology Consult Completed             Procedures/Scans Performed:  CXR  TTE       History of Presenting Illness     Chief Complaint   Patient presents with    Abnormal Lab    Shortness of Breath       Patient is a 89 y.o. female who presented to [facility] complaining of shortness of breath.  Please see the admitting history and physical for further details.      Hospital Course     Patient was admitted to [facility] following presentation to [facility] ED from local SNF on 11/10/2023 due to increasing shortness of breath.  Also complaining of increased lower extremity edema.  Work-up revealed proBNP 27,000 with HS troponin 42 with repeat at 44.  Creatinine appeared to be around baseline at 2.47.  She was admitted to the telemetry unit for further management.  Received 60 mg IV Lasix in the ED and was started on 40 mg IV Lasix daily.  Cardiology was consulted for further assistance.    Patient diuresed very well during admission, net negative about 5 L over the previous 72 hours.  TTE this admission noted normal LVEF as well as grade 2 diastolic dysfunction.  Cardiology saw and evaluated.  Hallam troponin  elevation was likely type II NSTEMI secondary to heart failure.  Once appearing clinically compensated IV Lasix was discontinued and patient will be continued on Lasix 40 mg p.o. daily at discharge for maintenance diuresis.  Pharmacy did assist with patient's warfarin dosing while inpatient.  Cardiology recommended continue current cardiac regimen including aspirin, atorvastatin, Imdur, metoprolol.  Patient is scheduled for pacemaker interrogation in about 1 month.  Renal function was monitored closely and actually improved daily, down to 2.3 on the date of discharge.    Given patient clinically improved with plan in place for maintenance diuresis and cardiology now signed off she was felt to have reached the maximum benefit of the current hospitalization.  Medication changes and discharge plan was discussed with the patient and her son on the date of discharge and expressed agreement understanding.  She was discharged back to SNF on this date in stable condition.    Discharge Vitals/Physical Examination     Vital Signs:  Temp:  [98 °F (36.7 °C)-98.4 °F (36.9 °C)] 98.4 °F (36.9 °C)  Heart Rate:  [79-84] 79  Resp:  [18] 18  BP: (127-136)/(60-65) 130/60  Mean Arterial Pressure (Non-Invasive) for the past 24 hrs (Last 3 readings):   Noninvasive MAP (mmHg)   11/13/23 0329 81   11/12/23 2331 87   11/12/23 1909 83     SpO2 Percentage    11/12/23 1909 11/12/23 2331 11/13/23 0329   SpO2: 92% 91% 94%     SpO2:  [91 %-94 %] 94 %  on   ;   Device (Oxygen Therapy): room air    Body mass index is 30.06 kg/m².  Wt Readings from Last 3 Encounters:   11/13/23 87.1 kg (192 lb)   10/29/23 65.8 kg (145 lb)   07/18/23 66.7 kg (147 lb)         Physical Exam:  Physical Exam  Vitals and nursing note reviewed.   Constitutional:       General: She is not in acute distress.  HENT:      Head: Normocephalic and atraumatic.   Eyes:      Extraocular Movements: Extraocular movements intact.      Conjunctiva/sclera: Conjunctivae normal.  "  Cardiovascular:      Rate and Rhythm: Normal rate and regular rhythm.   Pulmonary:      Effort: Pulmonary effort is normal.      Breath sounds: Normal breath sounds.   Abdominal:      Palpations: Abdomen is soft.   Musculoskeletal:      Right lower leg: Edema present.      Left lower leg: Edema present.      Comments: Trace   Skin:     General: Skin is warm and dry.   Neurological:      Mental Status: She is alert. Mental status is at baseline.   Psychiatric:         Mood and Affect: Mood normal.         Behavior: Behavior normal.         Pertinent Laboratory/Radiology Results     Pertinent Laboratory Results:  Results from last 7 days   Lab Units 11/10/23  2136 11/10/23  1854   HSTROP T ng/L 44* 42*     Results from last 7 days   Lab Units 11/10/23  1854 11/10/23  1612   PROBNP pg/mL 27,093.0* 24,828.0*         Results from last 7 days   Lab Units 11/13/23  0101 11/12/23  0133 11/11/23  0316 11/10/23  2136 11/10/23  1854   CRP mg/dL  --   --   --   --  <0.30   WBC 10*3/mm3  --  5.55 6.07  --  5.30   HEMOGLOBIN g/dL  --  8.2* 8.5*  --  8.8*   HEMATOCRIT %  --  26.5* 29.3*  --  29.9*   MCV fL  --  83.6 85.2  --  85.4   MCHC g/dL  --  30.9* 29.0*  --  29.4*   PLATELETS 10*3/mm3  --  180 216  --  212   INR  2.23* 1.90*  --  2.10*  --      Results from last 7 days   Lab Units 11/13/23  0101 11/12/23  0133 11/11/23  0316 11/10/23  1854   SODIUM mmol/L 139 138 141 141   POTASSIUM mmol/L 4.5 4.2 4.4 4.9   CHLORIDE mmol/L 105 104 104 105   CO2 mmol/L 24.8 23.7 24.2 24.8   BUN mg/dL 39* 38* 40* 41*   CREATININE mg/dL 2.30* 2.38* 2.50* 2.47*   CALCIUM mg/dL 8.4 8.5 8.5 8.7   GLUCOSE mg/dL 105* 107* 102* 158*   ALBUMIN g/dL  --   --  3.4* 3.6   BILIRUBIN mg/dL  --   --  0.4 0.4   ALK PHOS U/L  --   --  65 68   AST (SGOT) U/L  --   --  11 12   ALT (SGPT) U/L  --   --  6 8   Estimated Creatinine Clearance: 18.4 mL/min (A) (by C-G formula based on SCr of 2.3 mg/dL (H)).  No results found for: \"AMMONIA\"    Hemoglobin A1C " "  Date/Time Value Ref Range Status   11/10/2023 1854 5.50 4.80 - 5.60 % Final     Lab Results   Component Value Date    HGBA1C 5.50 11/10/2023     Lab Results   Component Value Date    TSH 3.030 10/07/2022       No results found for: \"BLOODCX\"  No results found for: \"URINECX\"  No results found for: \"WOUNDCX\"  No results found for: \"STOOLCX\"  No results found for: \"RESPCX\"  Pain Management Panel  More data exists         Latest Ref Rng & Units 9/20/2022 7/24/2019   Pain Management Panel   Creatinine, Urine mg/dL 110.9  48.9  48.9        Pertinent Radiology Results:  Imaging Results (All)       Procedure Component Value Units Date/Time    XR Chest 1 View [[id number]] Collected: 11/10/23 2012     Updated: 11/10/23 2016    Narrative:      PROCEDURE: Portable chest x-ray examination performed on November 10,  2023. Single view. Upright position.     HISTORY: Shortness of breath.     FINDINGS:     Enlarged heart size.  Left-sided pacemaker with leads to the right atrium and right ventricle.  Coarsened bronchovascular pattern to the lungs.  Small right and left pleural effusion.  Trace interstitial edema.  No pneumothorax.  No free air in the upper abdomen.  Mild degenerative disc disease throughout the thoracic spine.       Impression:         1.  Enlarged heart size.  2.  Left-sided pacemaker with leads to the right atrium and right  ventricle.  3.  Small right and left pleural effusion.  4.  Coarsened bronchovascular pattern to the lungs.  5.  Left lower lobe atelectasis.  6.  Trace interstitial edema.     This report was finalized on 11/10/2023 8:14 PM by Bubba Snow MD.               Discharge Disposition/Discharge Medications/Discharge Appointments     Discharge Disposition:   Skilled Nursing Facility (DC - External)    Condition at Discharge:  Stable     Discharge Medications:     Your medication list        CHANGE how you take these medications        Instructions Last Dose Given Next Dose Due   furosemide 40 MG " tablet  Commonly known as: Lasix  What changed:   medication strength  how much to take      Take 1 tablet by mouth Daily.              CONTINUE taking these medications        Instructions Last Dose Given Next Dose Due   acetaminophen 500 MG tablet  Commonly known as: TYLENOL      Take 1 tablet by mouth Every 6 (Six) Hours As Needed for Mild Pain.       albuterol sulfate  (90 Base) MCG/ACT inhaler  Commonly known as: PROVENTIL HFA;VENTOLIN HFA;PROAIR HFA      Inhale 2 puffs Every 6 (Six) Hours As Needed for Wheezing.       aspirin 81 MG chewable tablet      Chew 1 tablet Daily.       benzonatate 100 MG capsule  Commonly known as: TESSALON      Take 1 capsule by mouth 3 (Three) Times a Day As Needed for Cough.       docusate calcium 240 MG capsule  Commonly known as: SURFAK      Take 1 capsule by mouth Daily.       Entresto 49-51 MG tablet  Generic drug: sacubitril-valsartan      Take 1 tablet by mouth 2 (Two) Times a Day.       gabapentin 400 MG capsule  Commonly known as: NEURONTIN      Take 1 capsule by mouth Every Night.       HYDROcodone-acetaminophen 7.5-325 MG per tablet  Commonly known as: NORCO      Take 1 tablet by mouth Every 8 (Eight) Hours As Needed for Moderate Pain.       isosorbide mononitrate 60 MG 24 hr tablet  Commonly known as: IMDUR      Take 1 tablet by mouth Daily.       lactulose 10 GM/15ML solution  Commonly known as: CHRONULAC      Take 30 mL by mouth Daily As Needed (constipation).       melatonin 5 MG tablet tablet      Take 2 tablets by mouth Every Night.       metoprolol tartrate 25 MG tablet  Commonly known as: LOPRESSOR      Take 1 tablet by mouth 2 (Two) Times a Day.       ondansetron 4 MG tablet  Commonly known as: ZOFRAN      Take 1 tablet by mouth Every 12 (Twelve) Hours As Needed for Nausea or Vomiting.       pantoprazole 40 MG EC tablet  Commonly known as: PROTONIX      Take 1 tablet by mouth Daily.       potassium chloride 10 MEQ CR capsule  Commonly known as: MICRO-K       Take 1 capsule by mouth Daily.       sertraline 50 MG tablet  Commonly known as: ZOLOFT      Take 1 tablet by mouth every night at bedtime.       warfarin 4 MG tablet  Commonly known as: COUMADIN      Take 1 tablet by mouth every night at bedtime.       warfarin 1 MG tablet  Commonly known as: COUMADIN      Take 0.5 tablets by mouth Daily.                 Where to Get Your Medications        These medications were sent to [facility] - [address] [address]      Hours: Monday to Friday 7 AM to 6 PM Phone: [phone]   furosemide 40 MG tablet          Discharge Diet:  Diet Instructions       Diet: Regular/House Diet, Cardiac Diets; Low Sodium (2g); Soft to Chew (NDD 3); Whole Meat; Thin (IDDSI 0)      Discharge Diet:  Regular/House Diet  Cardiac Diets       Cardiac Diet: Low Sodium (2g)    Texture: Soft to Chew (NDD 3)    Soft to Chew: Whole Meat    Fluid Consistency: Thin (IDDSI 0)            Discharge Activity:  As tolerated

## 2023-11-13 NOTE — DISCHARGE PLACEMENT REQUEST
"Kinga Kinney (89 y.o. Female)       Date of Birth   12/09/1932    Social Security Number       Address   [address] [address]    Home Phone   [phone]    MRN   [id number]       Synagogue   Jainism    Marital Status                               Admission Date   11/10/23    Admission Type   Emergency    Admitting Provider   Silas Barba MD    Attending Provider   Luis Enrique Ashley DO    Department, Room/Bed   [address], 3304/1S       Discharge Date       Discharge Disposition   Skilled Nursing Facility (DC - External)    Discharge Destination                                 Attending Provider: Luis Enrique Ashley DO    Allergies: Adhesive Tape, Xanax [Alprazolam]    Isolation: None   Infection: None   Code Status: No CPR    Ht: 170.2 cm (67.01\")   Wt: 87.1 kg (192 lb)    Admission Cmt: None   Principal Problem: Acute on chronic combined systolic and diastolic CHF (congestive heart failure) [I50.43]                   Active Insurance as of 11/10/2023       Primary Coverage       Payor Plan Insurance Group Employer/Plan Group    [place]        Payor Plan Address Payor Plan Phone Number Payor Plan Fax Number Effective Dates    [address] [phone]  6/9/2023 - None Entered    [place] [address]         Subscriber Name Subscriber Birth Date Member ID       KINGA KINNEY 12/9/1932 [id number]               Secondary Coverage       Payor Plan Insurance Group Employer/Plan Group    ANTHEM MEDICARE REPLACEMENT ANTHEM MEDICARE ADVANTAGE [id number]       Payor Plan Address Payor Plan Phone Number Payor Plan Fax Number Effective Dates    [address] [phone]  1/1/2022 - None Entered    [facility] [address]         Subscriber Name Subscriber Birth Date Member ID       KINGA KINNEY 12/9/1932 [id number]                     Emergency Contacts        (Rel.) Home Phone Work Phone Mobile Phone    " Leno Kinney (Son) [phone] -- [phone]    Willian Kinney (Son) [phone] -- [phone]    Valdez Kinney (Son) [phone] -- --    BENITO KINNEY (Son) [phone] --              Insurance Information                  [place] Phone: [phone]    Subscriber: Gregoria Tre Subscriber#: [id number]    Group#: -- Precert#: --        ANTHEM MEDICARE REPLACEMENT/ANTHEM MEDICARE ADVANTAGE Phone: [phone]    Subscriber: Tre Kinney Subscriber#: [id number]    Group#: [id number] Precert#: --             History & Physical        Latonia Mireles PA-C at 11/10/23 2120       Attestation signed by Silas Barba MD at 11/10/23 1355    I have discussed this patient with Latonia Mireles PA-C, and have reviewed this documentation. Agree with CHF treatment. Troponin mildly elevated with essentially flat trend. In light of reports of chest pain which raises concerns for ischemia contributing to worsening CHF, will treat with full dose ASA and continue baby ASA daily starting in the morning. Already anticoagulated with warfarin. EKG not felt to show any evidence of acute ischemia. Await updated ECHO results and further recommendations from cardiology tomorrow.                        [facility]  HISTORY & PHYSICAL    Patient Identification:  Name:  Tre Kinney  Age:  89 y.o.  Sex:  female  :  1932  MRN:  [id number]   Visit Number:  [id number]  Admit Date: 11/10/2023   Primary Care Physician:  Benito Navarro MD     Subjective     Chief complaint:   Chief Complaint   Patient presents with    Abnormal Lab    Shortness of Breath     History of presenting illness:   Patient is a 89 y.o. female with past medical history significant for  CHF, chronic kidney disease, coronary artery disease s/p stenting, history of blood clots, chronic anticoagulation with Coumadin, SSS s/p pacemaker placement, chronic diastolic  CHF, and iron deficiency anemia  that presented to the [facility] emergency department for evaluation of shortness of breath and leg swelling.    Patient examined at bedside in ED.  Patient states she notably became more short of breath yesterday.  States her legs have been getting progressively more swollen as well.  She does also admit to chest pain that she describes as pressure, and slight dysuria.  She states the chest pain is intermittent.  She also notes she is feeling generally unwell.  Patient states they placed a Rodriguez cath prior to her leaving the nursing home due to giving her Lasix.  States she normally does not need a catheter.    Upon arrival to the ED, vitals were temperature 97.9, HR 81, RR 20, /70, SPO2 96% on room air.  Labs significant for proBNP 27,093, initial high-sensitivity troponin 42, repeat 44.  Patent 2.47, BUN 41, GFR 18.1.  INR 2.1.  Hemoglobin 8.8, hematocrit 29.9.  UA with moderate blood, 6-10 RBC, trace bacteria.    CXR shows enlarged heart size, left-sided pacemaker, small right and left pleural effusion, coarsened bronchovascular pattern, left lower lobe atelectasis, trace interstitial edema.    Patient has been admitted to the telemetry unit.  ---------------------------------------------------------------------------------------------------------------------   Review of Systems   Constitutional:  Positive for fatigue. Negative for chills, diaphoresis and fever.   Respiratory:  Positive for shortness of breath. Negative for cough and wheezing.    Cardiovascular:  Positive for chest pain and leg swelling. Negative for palpitations.   Gastrointestinal:  Negative for abdominal pain, constipation, diarrhea, nausea and vomiting.   Genitourinary:  Positive for dysuria. Negative for difficulty urinating and flank pain.   Musculoskeletal:  Positive for myalgias (legs). Negative for arthralgias and neck stiffness.   Skin:  Negative for rash and wound.   Neurological:   Negative for dizziness, weakness and light-headedness.   Psychiatric/Behavioral:  Negative for agitation and confusion.       ---------------------------------------------------------------------------------------------------------------------   Past Medical History:   Diagnosis Date    Anemia     Arthritis     CHF (congestive heart failure)     Chronic kidney disease     Coronary artery disease     H/O blood clots     History of transfusion     no reaction     Hypertension      Past Surgical History:   Procedure Laterality Date    ABDOMINAL SURGERY      APPENDECTOMY      CARDIAC SURGERY       SECTION      COLONOSCOPY N/A 3/7/2018    Procedure: COLONOSCOPY;  Surgeon: Willie Gresham MD;  Location:  [facility];  Service:     CORONARY ANGIOPLASTY WITH STENT PLACEMENT      ENDOSCOPY N/A 3/6/2018    Procedure: ESOPHAGOGASTRODUODENOSCOPY;  Surgeon: Willie Gresham MD;  Location:  [facility];  Service:     PACEMAKER IMPLANTATION      left side     SKIN BIOPSY      TUBAL ABDOMINAL LIGATION       Family History   Problem Relation Age of Onset    Other Mother         cardiac disorder    Hypertension Mother     Heart disease Mother     Thyroid disease Mother     Other Father         cardiac disorder    Heart disease Father     Hypertension Father     Other Sister         cardiac disorder    Other Brother         cardiac disorder    Other Other         cardiac disorder    Other Other         cardiac disorder     Social History     Socioeconomic History    Marital status:    Tobacco Use    Smoking status: Never   Vaping Use    Vaping Use: Never used   Substance and Sexual Activity    Alcohol use: No    Drug use: No    Sexual activity: Defer     ---------------------------------------------------------------------------------------------------------------------   Allergies:  Adhesive tape and Xanax  [alprazolam]  ---------------------------------------------------------------------------------------------------------------------   Medications below are reported home medications pulling from within the system; at this time, these medications have not been reconciled unless otherwise specified and are in the verification process for further verifcation as current home medications.    Prior to Admission Medications       Prescriptions Last Dose Informant Patient Reported? Taking?    albuterol sulfate  (90 Base) MCG/ACT inhaler   Yes Yes    Inhale 2 puffs Every 4 (Four) Hours As Needed for Wheezing.    furosemide (LASIX) 20 MG tablet   Yes Yes    Take 1 tablet by mouth 3 (Three) Times a Day.    gabapentin (NEURONTIN) 400 MG capsule  Self Yes Yes    Take 1 capsule by mouth Every Night.    HYDROcodone-acetaminophen (NORCO) 7.5-325 MG per tablet  Self Yes Yes    Take 1 tablet by mouth Every 6 (Six) Hours As Needed for Moderate Pain.    isosorbide mononitrate (IMDUR) 60 MG 24 hr tablet  Self Yes Yes    Take 1 tablet by mouth Daily.    metoprolol tartrate (LOPRESSOR) 25 MG tablet   Yes Yes    Take 1 tablet by mouth 2 (Two) Times a Day.    ondansetron (ZOFRAN) 4 MG tablet   Yes Yes    Take 1 tablet by mouth Every 12 (Twelve) Hours As Needed for Nausea or Vomiting.    pantoprazole (PROTONIX) 40 MG EC tablet  Self Yes Yes    Take 1 tablet by mouth Daily.    potassium chloride (MICRO-K) 10 MEQ CR capsule   Yes Yes    Take 1 capsule by mouth Daily.    sacubitril-valsartan (Entresto) 49-51 MG tablet   Yes Yes    Take 1 tablet by mouth 2 (Two) Times a Day.    sertraline (ZOLOFT) 50 MG tablet   Yes Yes    Take 1 tablet by mouth Daily.    warfarin (COUMADIN) 1 MG tablet   Yes Yes    Take 1 tablet by mouth Daily.    warfarin (COUMADIN) 4 MG tablet   Yes Yes    Take 1 tablet by mouth Daily.           ---------------------------------------------------------------------------------------------------------------------    Objective     Hospital Scheduled Meds:      No current facility-administered medications for this encounter.      Current listed hospital scheduled medications may not yet reflect those currently placed in orders that are signed and held, awaiting patient's arrival to floor/unit.    ---------------------------------------------------------------------------------------------------------------------   Vital Signs:  Temp:  [97.9 °F (36.6 °C)-98.4 °F (36.9 °C)] 98.4 °F (36.9 °C)  Heart Rate:  [73-90] 77  Resp:  [20] 20  BP: (138-178)/(68-89) 178/82  Mean Arterial Pressure (Non-Invasive) for the past 24 hrs (Last 3 readings):   Noninvasive MAP (mmHg)   11/10/23 2222 106   11/10/23 1900 89   11/10/23 1840 95     SpO2 Percentage    11/10/23 1844 11/10/23 1900 11/10/23 2222   SpO2: 96% 98% 97%     SpO2:  [96 %-98 %] 97 %  on   ;   Device (Oxygen Therapy): room air    Body mass index is 30.25 kg/m².  Wt Readings from Last 3 Encounters:   11/10/23 87.6 kg (193 lb 3.2 oz)   10/29/23 65.8 kg (145 lb)   07/18/23 66.7 kg (147 lb)     ---------------------------------------------------------------------------------------------------------------------   Physical Exam:  Physical Exam  Constitutional:       General: She is not in acute distress.     Appearance: Normal appearance.   HENT:      Head: Normocephalic and atraumatic.      Right Ear: External ear normal.      Left Ear: External ear normal.      Nose: No nasal deformity.      Mouth/Throat:      Lips: Pink.      Mouth: Mucous membranes are moist.   Eyes:      Conjunctiva/sclera: Conjunctivae normal.      Pupils: Pupils are equal, round, and reactive to light.   Cardiovascular:      Rate and Rhythm: Normal rate and regular rhythm.      Heart sounds: Normal heart sounds.   Pulmonary:      Effort: Pulmonary effort is normal.      Breath sounds: Normal  breath sounds. No wheezing, rhonchi or rales.   Abdominal:      General: Abdomen is flat. Bowel sounds are normal.      Palpations: Abdomen is soft.      Tenderness: There is no guarding or rebound.   Genitourinary:     Comments: No swan catheter in place   Musculoskeletal:      Cervical back: Neck supple. Normal range of motion.      Right lower le+ Pitting Edema present.      Left lower le+ Pitting Edema present.   Skin:     General: Skin is warm and dry.   Neurological:      General: No focal deficit present.      Mental Status: She is alert and oriented to person, place, and time.   Psychiatric:         Mood and Affect: Mood normal.         Behavior: Behavior normal.       ---------------------------------------------------------------------------------------------------------------------  EKG: Regular paced rhythm, heart rate 80.      Telemetry:        I have personally reviewed the EKG/Telemetry strip  ---------------------------------------------------------------------------------------------------------------------   Results from last 7 days   Lab Units 11/10/23  2136 11/10/23  1854   HSTROP T ng/L 44* 42*     Results from last 7 days   Lab Units 11/10/23  1854 11/10/23  1612   PROBNP pg/mL 27,093.0* 24,828.0*         Results from last 7 days   Lab Units 11/10/23  2136 11/10/23  1854   CRP mg/dL  --  <0.30   WBC 10*3/mm3  --  5.30   HEMOGLOBIN g/dL  --  8.8*   HEMATOCRIT %  --  29.9*   MCV fL  --  85.4   MCHC g/dL  --  29.4*   PLATELETS 10*3/mm3  --  212   INR  2.10*  --      Results from last 7 days   Lab Units 11/10/23  1854   SODIUM mmol/L 141   POTASSIUM mmol/L 4.9   CHLORIDE mmol/L 105   CO2 mmol/L 24.8   BUN mg/dL 41*   CREATININE mg/dL 2.47*   CALCIUM mg/dL 8.7   GLUCOSE mg/dL 158*   ALBUMIN g/dL 3.6   BILIRUBIN mg/dL 0.4   ALK PHOS U/L 68   AST (SGOT) U/L 12   ALT (SGPT) U/L 8   Estimated Creatinine Clearance: 17.2 mL/min (A) (by C-G formula based on SCr of 2.47 mg/dL (H)).  No results  "found for: \"AMMONIA\"    Hemoglobin A1C   Date/Time Value Ref Range Status   11/10/2023 1854 5.50 4.80 - 5.60 % Final     Lab Results   Component Value Date    HGBA1C 5.50 11/10/2023     Lab Results   Component Value Date    TSH 3.030 10/07/2022       No results found for: \"BLOODCX\"  No results found for: \"URINECX\"  No results found for: \"WOUNDCX\"  No results found for: \"STOOLCX\"  No results found for: \"RESPCX\"  No results found for: \"MRSACX\"  Pain Management Panel  More data exists         Latest Ref Rng & Units 9/20/2022 7/24/2019   Pain Management Panel   Creatinine, Urine mg/dL 110.9  48.9  48.9      I have personally reviewed the above laboratory results.   ---------------------------------------------------------------------------------------------------------------------  Imaging Results (Last 7 Days)       Procedure Component Value Units Date/Time    XR Chest 1 View [[id number]] Collected: 11/10/23 2012     Updated: 11/10/23 2016    Narrative:      PROCEDURE: Portable chest x-ray examination performed on November 10,  2023. Single view. Upright position.     HISTORY: Shortness of breath.     FINDINGS:     Enlarged heart size.  Left-sided pacemaker with leads to the right atrium and right ventricle.  Coarsened bronchovascular pattern to the lungs.  Small right and left pleural effusion.  Trace interstitial edema.  No pneumothorax.  No free air in the upper abdomen.  Mild degenerative disc disease throughout the thoracic spine.       Impression:         1.  Enlarged heart size.  2.  Left-sided pacemaker with leads to the right atrium and right  ventricle.  3.  Small right and left pleural effusion.  4.  Coarsened bronchovascular pattern to the lungs.  5.  Left lower lobe atelectasis.  6.  Trace interstitial edema.     This report was finalized on 11/10/2023 8:14 PM by Bubba Snow MD.             I have personally reviewed the above radiology results.     Last Echocardiogram:  Results for orders placed during " the hospital encounter of 09/19/22    Adult Transthoracic Echo Complete W/ Cont if Necessary Per Protocol    Interpretation Summary  · Left ventricular ejection fraction appears to be 51 - 55%. Left ventricular systolic function is low normal.  · Left ventricular wall thickness is consistent with mild concentric hypertrophy.  · Left ventricular diastolic function was indeterminate.  · The right ventricular cavity is moderately dilated.  · Moderately reduced right ventricular systolic function noted.  · The left atrial cavity is moderate to severely dilated  · The right atrial cavity is moderately dilated.  · Moderate tricuspid valve regurgitation is present.  · Estimated right ventricular systolic pressure from tricuspid regurgitation is markedly elevated (>55 mmHg).  · Severe pulmonary hypertension is present.  · Electronic lead present in the ventricle  · There is no evidence of pericardial effusion    ---------------------------------------------------------------------------------------------------------------------    Assessment & Plan      Active Hospital Problems    Diagnosis  POA    **Acute on chronic combined systolic and diastolic CHF (congestive heart failure) [I50.43]  Yes     Acute on chronic combined systolic and diastolic CHF, POA  Chronic Kidney Disease Stage IV  Dysuria  Presented with complaints of worsening shortness of breath and leg swelling  CXR shows cardiomegaly.  Bilateral lower extremity edema on exam.   Lasix 60 mg IV given in ED.  Monitor urine output and renal response and can further diuresis renally tolerated.  Plans for Lasix 40 mg IV daily for now.  Inpatient cardiology consult, assistance appreciated  Patient presented with Cr 2.47, baseline around 2.2.  Monitor renal function closely due to diuresis  Trend Cr and urine output, avoid nephrotoxins, NSAIDs, dehydration and contrast as able.   Urine culture added due to complaints of dysuria and trace bacteria on UA.  Monitor strict  I's and O's and daily weights    CHRONIC MEDICAL PROBLEMS    Essential hypertension  BP appears variably controlled   Plan to resume home antihypertensive regimen once reconciled per pharmacy with appropriate holding parameters to prevent hypotension and/or bradycardia   Closely monitor BP per hospital protocol, titrate medications as necessary    Coronary artery disease s/p stenting,  History of blood clots  Chronic anticoagulation with Coumadin  SSS s/p pacemaker placement  Restart home meds pending med rec  Continuous cardiac monitoring  Monitor for signs and symptoms of bleeding  Monitor INR  Iron deficiency anemia   H&H stable  Baseline hemoglobin is around 9, hemoglobin admission 8.8  Monitor daily CBC    F/E/N: Hydration.  Continue monitor electrolytes.  Cardiac/renal diet.    ---------------------------------------------------  DVT Prophylaxis: Chronically anticoagulated with warfarin  GI Prophylaxis: Bowel regimen  Activity: Up with assistance    The patient is considered to be a high risk patient due to: CHF with acute exacerbation    INPATIENT status due to the need for care which can only be reasonably provided in an hospital setting such as aggressive/expedited ancillary services and/or consultation services, the necessity for IV medications, close physician monitoring and/or the possible need for procedures.  In such, I feel patient’s risk for adverse outcomes and need for care warrant INPATIENT evaluation and predict the patient’s care encounter to likely last beyond 2 midnights.     Code Status: DNR.  Full support.  Patient would like to further discuss this with palliative care    Disposition/Discharge planning: Pending clinical course, plans to discharge back to nursing home when medically stable    I have discussed the patient's assessment and plan with Dr. Jameson Mireles PA-C  Hospitalist Service -- [facility]       11/10/23  22:57 EST    Attending Physician: Jameson  Silas Florentino MD        Electronically signed by Silas Barba MD at 11/10/23 2322       Current Facility-Administered Medications   Medication Dose Route Frequency Provider Last Rate Last Admin    acetaminophen (TYLENOL) tablet 500 mg  500 mg Oral Q6H PRN Silas Barba MD        aspirin chewable tablet 81 mg  81 mg Oral Daily Silas Barba MD   81 mg at 11/13/23 0844    sennosides-docusate (PERICOLACE) 8.6-50 MG per tablet 2 tablet  2 tablet Oral BID Silas Barba MD   2 tablet at 11/13/23 0844    And    polyethylene glycol (MIRALAX) packet 17 g  17 g Oral Daily PRN Silas Barba MD        And    bisacodyl (DULCOLAX) EC tablet 5 mg  5 mg Oral Daily PRN Silas Barba MD        And    bisacodyl (DULCOLAX) suppository 10 mg  10 mg Rectal Daily PRN Silas Barba MD        furosemide (LASIX) injection 40 mg  40 mg Intravenous Daily Silas Barba MD   40 mg at 11/13/23 0844    gabapentin (NEURONTIN) capsule 400 mg  400 mg Oral Nightly Silas Barba MD   400 mg at 11/12/23 2030    HYDROcodone-acetaminophen (NORCO) 7.5-325 MG per tablet 1 tablet  1 tablet Oral Q8H PRN Silas Barba MD   1 tablet at 11/12/23 2030    isosorbide mononitrate (IMDUR) 24 hr tablet 60 mg  60 mg Oral Daily Silas Barba MD   60 mg at 11/13/23 0844    lactulose (CHRONULAC) 10 GM/15ML solution 20 g  20 g Oral Daily PRN Silas Barba MD        melatonin tablet 10 mg  10 mg Oral Nightly Silas Barba MD   10 mg at 11/12/23 2030    metoprolol tartrate (LOPRESSOR) tablet 25 mg  25 mg Oral BID Silas Barba MD   25 mg at 11/13/23 0844    nitroglycerin (NITROSTAT) SL tablet 0.4 mg  0.4 mg Sublingual Q5 Min PRN Silas Barba MD        pantoprazole (PROTONIX) EC tablet 40 mg  40 mg Oral Daily Silas Barba MD   40 mg at 11/13/23 0844    Pharmacy to dose warfarin   Does not apply Continuous PRN Silas Barba,  MD        sacubitril-valsartan (ENTRESTO) 49-51 MG tablet 1 tablet  1 tablet Oral BID Silas Barba MD   1 tablet at 23 0844    sertraline (ZOLOFT) tablet 50 mg  50 mg Oral Nightly Silas Barba MD   50 mg at 23 2030    sodium chloride 0.9 % flush 10 mL  10 mL Intravenous Q12H Silas Barba MD   10 mL at 23 0845    sodium chloride 0.9 % flush 10 mL  10 mL Intravenous PRN Silas Barba MD        sodium chloride 0.9 % infusion 40 mL  40 mL Intravenous PRN Silas Barba MD        warfarin (COUMADIN) tablet 4 mg  4 mg Oral Once Steffanie Santana, GinnaD            Physician Progress Notes (most recent note)        Ngozi De La Cruz MD at [phone]            Patient Identification:  Name:  Tre Kinney  Age:  89 y.o.  Sex:  female  :  1932  MRN:  [id number]  Visit Number:  [id number]  Primary care provider:  Benito Navarro MD    Reason for follow-up:  Acute on chronic heart failure with preserved EF    Subjective     She was evaluated around 8:30 AM today.    89-year-old nursing home resident has been admitted with acute on chronic heart failure with preserved EF.  Received IV Lasix.  Had adequate urinary output with improved symptoms.  Shortness of breath is improved.  Leg edema-resolving.  No history of chest pain or dizziness.    She has history of chronic atrial fibrillation, arteriosclerotic CAD s/p stenting LAD and multiple cardiac risk factors.      Medication Review:   aspirin, 81 mg, Oral, Daily  furosemide, 40 mg, Intravenous, Daily  gabapentin, 400 mg, Oral, Nightly  isosorbide mononitrate, 60 mg, Oral, Daily  melatonin, 10 mg, Oral, Nightly  metoprolol tartrate, 25 mg, Oral, BID  pantoprazole, 40 mg, Oral, Daily  sacubitril-valsartan, 1 tablet, Oral, BID  senna-docusate sodium, 2 tablet, Oral, BID  sertraline, 50 mg, Oral, Nightly  sodium chloride, 10 mL, Intravenous, Q12H  warfarin, 4 mg, Oral, Once      Pharmacy to dose  "warfarin,            Vital Sign Min/Max for last 24 hours  Temp  Min: 98 °F (36.7 °C)  Max: 98.5 °F (36.9 °C)   BP  Min: 127/60  Max: 136/65   Pulse  Min: 79  Max: 85   Resp  Min: 18  Max: 18   SpO2  Min: 91 %  Max: 94 %   No data recorded   Weight  Min: 87.1 kg (192 lb)  Max: 87.1 kg (192 lb)     Flowsheet Rows      Flowsheet Row First Filed Value   Admission Height 170.2 cm (67\") Documented at 11/10/2023 1836   Admission Weight 65.8 kg (145 lb) Documented at 11/10/2023 1836            Objective       Physical Exam:         General Appearance:  HEENT:   Neck:     Alert, cooperative, in no acute distress  Grossly normal   No JVD    Lungs:   Clear to auscultation,respirations regular, No added sounds     Heart:  Irregular rhythm.  Normal heart rate.  No murmurs.    Chest Wall:  No abnormalities observed    Abdomen   Soft, nontender, no masses, no organomegaly and bowel sounds are heard.     Extremities: 1+ bilateral pedal edema    Pulses: Pulses palpable and equal bilaterally    Neurologic: No focal deficits         Telemetry:  A-fib with controlled ventricular rate      Labs  Results from last 7 days   Lab Units 11/12/23  0133 11/11/23  0316 11/10/23  1854   WBC 10*3/mm3 5.55 6.07 5.30   HEMOGLOBIN g/dL 8.2* 8.5* 8.8*   HEMATOCRIT % 26.5* 29.3* 29.9*   PLATELETS 10*3/mm3 180 216 212     Results from last 7 days   Lab Units 11/13/23  0101 11/12/23  0133 11/11/23  0316 11/10/23  1854   SODIUM mmol/L 139 138 141 141   POTASSIUM mmol/L 4.5 4.2 4.4 4.9   CHLORIDE mmol/L 105 104 104 105   CO2 mmol/L 24.8 23.7 24.2 24.8   BUN mg/dL 39* 38* 40* 41*   CREATININE mg/dL 2.30* 2.38* 2.50* 2.47*   CALCIUM mg/dL 8.4 8.5 8.5 8.7   GLUCOSE mg/dL 105* 107* 102* 158*     Results from last 7 days   Lab Units 11/11/23  0316 11/10/23  1854   BILIRUBIN mg/dL 0.4 0.4   ALK PHOS U/L 65 68   AST (SGOT) U/L 11 12   ALT (SGPT) U/L 6 8         Results from last 7 days   Lab Units 11/13/23  0101 11/12/23  0133 11/10/23  2136   INR  2.23* 1.90* " 2.10*     Results from last 7 days   Lab Units 11/10/23  1854   CRP mg/dL <0.30     Results from last 7 days   Lab Units 11/10/23  2136 11/10/23  1854   HSTROP T ng/L 44* 42*           Assessment      1.  Acute on chronic heart failure with preserved EF.  Clinically improving.  2.  Chronic atrial fibrillation.  Asymptomatic.  Rate is controlled.  3.  ASCVD s/p stent in LAD.  Stable.  No angina.  4.  S/p PPM for SSS  5.  Hypertension-controlled  6.  Hyperlipidemia    Plan     1.  HFpEF-clinically improving.  Continue Lasix at 40 mg p.o. daily for maintenance diuresis.  2.  CAD-stable.  Continue aspirin, atorvastatin, Imdur and metoprolol.  3.  A-fib-rate is controlled.  Continue metoprolol and warfarin with a target INR of 2-3.  4.  SSS-s/p PPM.  Next pacemaker interrogation scheduled 12/2023.      Ngozi De La Cruz MD [facility]  11/13/23  12:38 EST          Electronically signed by Ngozi De La Cruz MD at 11/13/23 1247       Physical Therapy Notes (most recent note)    No notes exist for this encounter.       Occupational Therapy Notes (most recent note)    No notes exist for this encounter.       Speech Language Pathology Notes (most recent note)    No notes exist for this encounter.       ADL Documentation (most recent)      Flowsheet Row Most Recent Value   Transferring 4 - completely dependent   Toileting 3 - assistive equipment and person   Bathing 2 - assistive person   Dressing 2 - assistive person   Eating 2 - assistive person   Communication 0 - understands/communicates without difficulty   Swallowing 2 - difficulty swallowing foods   Equipment Currently Used at Home none            Discharge Summary    No notes of this type exist for this encounter.       Discharge Order (From admission, onward)       Start     Ordered    11/13/23 1228  Discharge patient  Once        Expected Discharge Date: 11/13/23   Discharge Disposition: Skilled Nursing Facility (DC - External)   Physician of Record for Attribution - Please  select from Treatment Team: EMMA MONGE [[id number]]   Review needed by CMO to determine Physician of Record: No      Question Answer Comment   Physician of Record for Attribution - Please select from Treatment Team EMMA MONGE    Review needed by CMO to determine Physician of Record No        11/13/23 9612

## 2023-11-13 NOTE — PLAN OF CARE
Goal Outcome Evaluation:  Problem: Adult Inpatient Plan of Care  Goal: Plan of Care Review  Outcome: Met  Flowsheets (Taken 11/13/2023 2590)  Progress: improving  Plan of Care Reviewed With: patient  Patient resting in bed. No acute signs or symptoms of distress. No complaints at this time. Patient catheter care complete. No changes overnight. Patient from [facility]. To return to facility upon discharge. Will continue to follow plan of care.

## 2023-11-13 NOTE — PLAN OF CARE
Goal Outcome Evaluation:  Plan of Care Reviewed With: patient        Progress: improving          Patient being dc this shift. IV and tele have been removed, report called to Ileana at [facility]

## 2023-11-14 NOTE — PAYOR COMM NOTE
"CONTACT:  DIETER CHUNG RN  UTILIZATION MANAGEMENT DEPT.   [facility]   [address] [facility], [address]   PHONE:  [phone]   FAX: [phone]     [facility] NOTIFICATION    Ref # [id number]      Kinga Kinney (89 y.o. Female)       Date of Birth   12/09/1932    Social Security Number       Address   [address] [address]    Home Phone   [phone]    MRN   [id number]       M[employer]   Syn[employer]    Marital Status                               Admission Date   11/10/23    Admission Type   Emergency    Admitting Provider   Silas Barba MD    Attending Provider       Department, Room/Bed   [facility], 3304/1S       Discharge Date   11/13/2023    Discharge Disposition   Skilled Nursing Facility (DC - External)    Discharge Destination                                 Attending Provider: (none)   Allergies: Adhesive Tape, Xanax [Alprazolam]    Isolation: None   Infection: None   Code Status: Prior    Ht: 170.2 cm (67.01\")   Wt: 87.1 kg (192 lb)    Admission Cmt: None   Principal Problem: Acute on chronic combined systolic and diastolic CHF (congestive heart failure) [I50.43]                   Active Insurance as of 11/10/2023       Primary Coverage       Payor Plan Insurance Group Employer/Plan Group    [place]        Payor Plan Address Payor Plan Phone Number Payor Plan Fax Number Effective Dates    [address] [phone]  6/9/2023 - None Entered    [facility] [id number]         Subscriber Name Subscriber Birth Date Member ID       KINGA KINNEY 12/9/1932 [id number]               Secondary Coverage       Payor Plan Insurance Group Employer/Plan Group    ANTHEM MEDICARE REPLACEMENT ANTHEM MEDICARE ADVANTAGE [id number]       Payor Plan Address Payor Plan Phone Number Payor Plan Fax Number Effective Dates    [phone]  1/1/2022 - None Entered    [facility] [address]         Subscriber Name Subscriber Birth " Date Member ID       TRE KINNEY 1932 [id number]                     Emergency Contacts        (Rel.) Home Phone Work Phone Mobile Phone    Leno Kinney (Son) [phone] -- [phone]    Willian Kinney (Son) [phone] -- [phone]    Valdez Kinney (Son) [phone] -- --    BENITO KINNEY (Son) [phone] --                 Discharge Summary        Ankit Tai PA-C at 23 1626       Attestation signed by Luis Enrique Ashley DO at 23 1816    I have reviewed this documentation and agree. D/w cards- [facility]                      [facility] HOSPITALIST DISCHARGE SUMMARY    Patient Identification:  Name:  Tre Kinney  Age:  89 y.o.  Sex:  female  :  1932  MRN:  [id number]  Visit Number:  [id number]    Date of Admission: 11/10/2023  Date of Discharge: 2023    PCP: Benito Navarro MD    Discharging Provider: Julius Tai PA-C / Dr. Ashley    Discharge Diagnoses     Discharge Diagnoses:  Acute on chronic combined systolic and diastolic heart failure, resolved  NSTEMI, type II due to above  Complicated by CKD stage IV    Secondary Diagnoses:  CAD s/p stenting  Hx blood clotting-anticoagulated with warfarin  Sick sinus syndrome s/p pacer  Iron deficiency anemia    Needs on follow up:    Consults/Procedures     Consults:   Consults       Date and Time Order Name Status Description    11/10/2023 11:17 PM Inpatient Cardiology Consult Completed             Procedures/Scans Performed:  CXR  TTE       History of Presenting Illness     Chief Complaint   Patient presents with    Abnormal Lab    Shortness of Breath       Patient is a 89 y.o. female who presented to [facility] complaining of shortness of breath.  Please see the admitting history and physical for further details.      Hospital Course     Patient was admitted to [facility] following presentation to [facility] ED from local  SNF on 11/10/2023 due to increasing shortness of breath.  Also complaining of increased lower extremity edema.  Work-up revealed proBNP 27,000 with HS troponin 42 with repeat at 44.  Creatinine appeared to be around baseline at 2.47.  She was admitted to the telemetry unit for further management.  Received 60 mg IV Lasix in the ED and was started on 40 mg IV Lasix daily.  Cardiology was consulted for further assistance.    Patient diuresed very well during admission, net negative about 5 L over the previous 72 hours.  TTE this admission noted normal LVEF as well as grade 2 diastolic dysfunction.  Cardiology saw and evaluated.  Felt troponin elevation was likely type II NSTEMI secondary to heart failure.  Once appearing clinically compensated IV Lasix was discontinued and patient will be continued on Lasix 40 mg p.o. daily at discharge for maintenance diuresis.  Pharmacy did assist with patient's warfarin dosing while inpatient.  Cardiology recommended continue current cardiac regimen including aspirin, atorvastatin, Imdur, metoprolol.  Patient is scheduled for pacemaker interrogation in about 1 month.  Renal function was monitored closely and actually improved daily, down to 2.3 on the date of discharge.    Given patient clinically improved with plan in place for maintenance diuresis and cardiology now signed off she was felt to have reached the maximum benefit of the current hospitalization.  Medication changes and discharge plan was discussed with the patient and her son on the date of discharge and expressed agreement understanding.  She was discharged back to SNF on this date in stable condition.    Discharge Vitals/Physical Examination     Vital Signs:  Temp:  [98 °F (36.7 °C)-98.4 °F (36.9 °C)] 98.4 °F (36.9 °C)  Heart Rate:  [79-84] 79  Resp:  [18] 18  BP: (127-136)/(60-65) 130/60  Mean Arterial Pressure (Non-Invasive) for the past 24 hrs (Last 3 readings):   Noninvasive MAP (mmHg)   11/13/23 0329 81    11/12/23 2331 87   11/12/23 1909 83     SpO2 Percentage    11/12/23 1909 11/12/23 2331 11/13/23 0329   SpO2: 92% 91% 94%     SpO2:  [91 %-94 %] 94 %  on   ;   Device (Oxygen Therapy): room air    Body mass index is 30.06 kg/m².  Wt Readings from Last 3 Encounters:   11/13/23 87.1 kg (192 lb)   10/29/23 65.8 kg (145 lb)   07/18/23 66.7 kg (147 lb)         Physical Exam:  Physical Exam  Vitals and nursing note reviewed.   Constitutional:       General: She is not in acute distress.  HENT:      Head: Normocephalic and atraumatic.   Eyes:      Extraocular Movements: Extraocular movements intact.      Conjunctiva/sclera: Conjunctivae normal.   Cardiovascular:      Rate and Rhythm: Normal rate and regular rhythm.   Pulmonary:      Effort: Pulmonary effort is normal.      Breath sounds: Normal breath sounds.   Abdominal:      Palpations: Abdomen is soft.   Musculoskeletal:      Right lower leg: Edema present.      Left lower leg: Edema present.      Comments: Trace   Skin:     General: Skin is warm and dry.   Neurological:      Mental Status: She is alert. Mental status is at baseline.   Psychiatric:         Mood and Affect: Mood normal.         Behavior: Behavior normal.         Pertinent Laboratory/Radiology Results     Pertinent Laboratory Results:  Results from last 7 days   Lab Units 11/10/23  2136 11/10/23  1854   HSTROP T ng/L 44* 42*     Results from last 7 days   Lab Units 11/10/23  1854 11/10/23  1612   PROBNP pg/mL 27,093.0* 24,828.0*         Results from last 7 days   Lab Units 11/13/23  0101 11/12/23  0133 11/11/23  0316 11/10/23  2136 11/10/23  1854   CRP mg/dL  --   --   --   --  <0.30   WBC 10*3/mm3  --  5.55 6.07  --  5.30   HEMOGLOBIN g/dL  --  8.2* 8.5*  --  8.8*   HEMATOCRIT %  --  26.5* 29.3*  --  29.9*   MCV fL  --  83.6 85.2  --  85.4   MCHC g/dL  --  30.9* 29.0*  --  29.4*   PLATELETS 10*3/mm3  --  180 216  --  212   INR  2.23* 1.90*  --  2.10*  --      Results from last 7 days   Lab Units  "11/13/23  0101 11/12/23  0133 11/11/23  0316 11/10/23  1854   SODIUM mmol/L 139 138 141 141   POTASSIUM mmol/L 4.5 4.2 4.4 4.9   CHLORIDE mmol/L 105 104 104 105   CO2 mmol/L 24.8 23.7 24.2 24.8   BUN mg/dL 39* 38* 40* 41*   CREATININE mg/dL 2.30* 2.38* 2.50* 2.47*   CALCIUM mg/dL 8.4 8.5 8.5 8.7   GLUCOSE mg/dL 105* 107* 102* 158*   ALBUMIN g/dL  --   --  3.4* 3.6   BILIRUBIN mg/dL  --   --  0.4 0.4   ALK PHOS U/L  --   --  65 68   AST (SGOT) U/L  --   --  11 12   ALT (SGPT) U/L  --   --  6 8   Estimated Creatinine Clearance: 18.4 mL/min (A) (by C-G formula based on SCr of 2.3 mg/dL (H)).  No results found for: \"AMMONIA\"    Hemoglobin A1C   Date/Time Value Ref Range Status   11/10/2023 1854 5.50 4.80 - 5.60 % Final     Lab Results   Component Value Date    HGBA1C 5.50 11/10/2023     Lab Results   Component Value Date    TSH 3.030 10/07/2022       No results found for: \"BLOODCX\"  No results found for: \"URINECX\"  No results found for: \"WOUNDCX\"  No results found for: \"STOOLCX\"  No results found for: \"RESPCX\"  Pain Management Panel  More data exists         Latest Ref Rng & Units 9/20/2022 7/24/2019   Pain Management Panel   Creatinine, Urine mg/dL 110.9  48.9  48.9        Pertinent Radiology Results:  Imaging Results (All)       Procedure Component Value Units Date/Time    XR Chest 1 View [[id number]] Collected: 11/10/23 2012     Updated: 11/10/23 2016    Narrative:      PROCEDURE: Portable chest x-ray examination performed on November 10,  2023. Single view. Upright position.     HISTORY: Shortness of breath.     FINDINGS:     Enlarged heart size.  Left-sided pacemaker with leads to the right atrium and right ventricle.  Coarsened bronchovascular pattern to the lungs.  Small right and left pleural effusion.  Trace interstitial edema.  No pneumothorax.  No free air in the upper abdomen.  Mild degenerative disc disease throughout the thoracic spine.       Impression:         1.  Enlarged heart size.  2.  Left-sided " pacemaker with leads to the right atrium and right  ventricle.  3.  Small right and left pleural effusion.  4.  Coarsened bronchovascular pattern to the lungs.  5.  Left lower lobe atelectasis.  6.  Trace interstitial edema.     This report was finalized on 11/10/2023 8:14 PM by Bubba Snow MD.               Discharge Disposition/Discharge Medications/Discharge Appointments     Discharge Disposition:   Skilled Nursing Facility (DC - External)    Condition at Discharge:  Stable     Discharge Medications:     Your medication list        CHANGE how you take these medications        Instructions Last Dose Given Next Dose Due   furosemide 40 MG tablet  Commonly known as: Lasix  What changed:   medication strength  how much to take      Take 1 tablet by mouth Daily.              CONTINUE taking these medications        Instructions Last Dose Given Next Dose Due   acetaminophen 500 MG tablet  Commonly known as: TYLENOL      Take 1 tablet by mouth Every 6 (Six) Hours As Needed for Mild Pain.       albuterol sulfate  (90 Base) MCG/ACT inhaler  Commonly known as: PROVENTIL HFA;VENTOLIN HFA;PROAIR HFA      Inhale 2 puffs Every 6 (Six) Hours As Needed for Wheezing.       aspirin 81 MG chewable tablet      Chew 1 tablet Daily.       benzonatate 100 MG capsule  Commonly known as: TESSALON      Take 1 capsule by mouth 3 (Three) Times a Day As Needed for Cough.       docusate calcium 240 MG capsule  Commonly known as: SURFAK      Take 1 capsule by mouth Daily.       Entresto 49-51 MG tablet  Generic drug: sacubitril-valsartan      Take 1 tablet by mouth 2 (Two) Times a Day.       gabapentin 400 MG capsule  Commonly known as: NEURONTIN      Take 1 capsule by mouth Every Night.       HYDROcodone-acetaminophen 7.5-325 MG per tablet  Commonly known as: NORCO      Take 1 tablet by mouth Every 8 (Eight) Hours As Needed for Moderate Pain.       isosorbide mononitrate 60 MG 24 hr tablet  Commonly known as: IMDUR      Take 1  tablet by mouth Daily.       lactulose 10 GM/15ML solution  Commonly known as: CHRONULAC      Take 30 mL by mouth Daily As Needed (constipation).       melatonin 5 MG tablet tablet      Take 2 tablets by mouth Every Night.       metoprolol tartrate 25 MG tablet  Commonly known as: LOPRESSOR      Take 1 tablet by mouth 2 (Two) Times a Day.       ondansetron 4 MG tablet  Commonly known as: ZOFRAN      Take 1 tablet by mouth Every 12 (Twelve) Hours As Needed for Nausea or Vomiting.       pantoprazole 40 MG EC tablet  Commonly known as: PROTONIX      Take 1 tablet by mouth Daily.       potassium chloride 10 MEQ CR capsule  Commonly known as: MICRO-K      Take 1 capsule by mouth Daily.       sertraline 50 MG tablet  Commonly known as: ZOLOFT      Take 1 tablet by mouth every night at bedtime.       warfarin 4 MG tablet  Commonly known as: COUMADIN      Take 1 tablet by mouth every night at bedtime.       warfarin 1 MG tablet  Commonly known as: COUMADIN      Take 0.5 tablets by mouth Daily.                 Where to Get Your Medications        These medications were sent to [facility] [place] [id number]      Hours: Monday to Friday 7 AM to 6 PM Phone: [phone]   furosemide 40 MG tablet          Discharge Diet:  Diet Instructions       Diet: Regular/House Diet, Cardiac Diets; Low Sodium (2g); Soft to Chew (NDD 3); Whole Meat; Thin (IDDSI 0)      Discharge Diet:  Regular/House Diet  Cardiac Diets       Cardiac Diet: Low Sodium (2g)    Texture: Soft to Chew (NDD 3)    Soft to Chew: Whole Meat    Fluid Consistency: Thin (IDDSI 0)            Discharge Activity:  As tolerated        Electronically signed by Luis Enrique Ashley DO at 11/13/23 2311

## 2023-12-04 ENCOUNTER — LAB REQUISITION (OUTPATIENT)
Dept: LAB | Facility: HOSPITAL | Age: 88
End: 2023-12-04
Payer: MEDICARE

## 2023-12-04 DIAGNOSIS — D64.9 ANEMIA, UNSPECIFIED: ICD-10-CM

## 2023-12-04 LAB
ANION GAP SERPL CALCULATED.3IONS-SCNC: 11.8 MMOL/L (ref 5–15)
ANISOCYTOSIS BLD QL: NORMAL
BASOPHILS # BLD AUTO: 0.06 10*3/MM3 (ref 0–0.2)
BASOPHILS NFR BLD AUTO: 1.1 % (ref 0–1.5)
BUN SERPL-MCNC: 44 MG/DL (ref 8–23)
BUN/CREAT SERPL: 17.7 (ref 7–25)
CALCIUM SPEC-SCNC: 8.4 MG/DL (ref 8.2–9.6)
CHLORIDE SERPL-SCNC: 104 MMOL/L (ref 98–107)
CO2 SERPL-SCNC: 23.2 MMOL/L (ref 22–29)
CREAT SERPL-MCNC: 2.48 MG/DL (ref 0.57–1)
DEPRECATED RDW RBC AUTO: 53.8 FL (ref 37–54)
EGFRCR SERPLBLD CKD-EPI 2021: 18 ML/MIN/1.73
EOSINOPHIL # BLD AUTO: 0.22 10*3/MM3 (ref 0–0.4)
EOSINOPHIL NFR BLD AUTO: 4 % (ref 0.3–6.2)
ERYTHROCYTE [DISTWIDTH] IN BLOOD BY AUTOMATED COUNT: 17.8 % (ref 12.3–15.4)
GLUCOSE SERPL-MCNC: 90 MG/DL (ref 65–99)
HCT VFR BLD AUTO: 27 % (ref 34–46.6)
HGB BLD-MCNC: 7.8 G/DL (ref 12–15.9)
HYPOCHROMIA BLD QL: NORMAL
IMM GRANULOCYTES # BLD AUTO: 0.02 10*3/MM3 (ref 0–0.05)
IMM GRANULOCYTES NFR BLD AUTO: 0.4 % (ref 0–0.5)
INR PPP: 2.8 (ref 0.9–1.1)
LYMPHOCYTES # BLD AUTO: 1.13 10*3/MM3 (ref 0.7–3.1)
LYMPHOCYTES NFR BLD AUTO: 20.4 % (ref 19.6–45.3)
MCH RBC QN AUTO: 23.9 PG (ref 26.6–33)
MCHC RBC AUTO-ENTMCNC: 28.9 G/DL (ref 31.5–35.7)
MCV RBC AUTO: 82.8 FL (ref 79–97)
MONOCYTES # BLD AUTO: 0.62 10*3/MM3 (ref 0.1–0.9)
MONOCYTES NFR BLD AUTO: 11.2 % (ref 5–12)
NEUTROPHILS NFR BLD AUTO: 3.5 10*3/MM3 (ref 1.7–7)
NEUTROPHILS NFR BLD AUTO: 62.9 % (ref 42.7–76)
NRBC BLD AUTO-RTO: 0 /100 WBC (ref 0–0.2)
PLAT MORPH BLD: NORMAL
PLATELET # BLD AUTO: 214 10*3/MM3 (ref 140–450)
PMV BLD AUTO: 10 FL (ref 6–12)
POIKILOCYTOSIS BLD QL SMEAR: NORMAL
POTASSIUM SERPL-SCNC: 5 MMOL/L (ref 3.5–5.2)
PROTHROMBIN TIME: 30.3 SECONDS (ref 12.1–14.7)
RBC # BLD AUTO: 3.26 10*6/MM3 (ref 3.77–5.28)
SODIUM SERPL-SCNC: 139 MMOL/L (ref 136–145)
WBC NRBC COR # BLD AUTO: 5.55 10*3/MM3 (ref 3.4–10.8)

## 2023-12-04 PROCEDURE — 80048 BASIC METABOLIC PNL TOTAL CA: CPT | Performed by: INTERNAL MEDICINE

## 2023-12-04 PROCEDURE — 85610 PROTHROMBIN TIME: CPT | Performed by: INTERNAL MEDICINE

## 2023-12-04 PROCEDURE — 85025 COMPLETE CBC W/AUTO DIFF WBC: CPT | Performed by: INTERNAL MEDICINE

## 2023-12-04 PROCEDURE — 85007 BL SMEAR W/DIFF WBC COUNT: CPT | Performed by: INTERNAL MEDICINE

## 2023-12-13 ENCOUNTER — LAB REQUISITION (OUTPATIENT)
Dept: LAB | Facility: HOSPITAL | Age: 88
End: 2023-12-13
Payer: MEDICAID

## 2023-12-13 DIAGNOSIS — I77.3 ARTERIAL FIBROMUSCULAR DYSPLASIA: ICD-10-CM

## 2023-12-13 LAB
INR PPP: 3.5 (ref 0.9–1.1)
PROTHROMBIN TIME: 35.9 SECONDS (ref 12.1–14.7)

## 2023-12-13 PROCEDURE — 85610 PROTHROMBIN TIME: CPT | Performed by: INTERNAL MEDICINE

## 2023-12-14 ENCOUNTER — LAB REQUISITION (OUTPATIENT)
Dept: LAB | Facility: HOSPITAL | Age: 88
End: 2023-12-14
Payer: MEDICAID

## 2023-12-14 DIAGNOSIS — I77.3 ARTERIAL FIBROMUSCULAR DYSPLASIA: ICD-10-CM

## 2023-12-14 LAB
INR PPP: 2.82 (ref 0.9–1.1)
PROTHROMBIN TIME: 30.4 SECONDS (ref 12.1–14.7)

## 2023-12-14 PROCEDURE — 85610 PROTHROMBIN TIME: CPT | Performed by: INTERNAL MEDICINE

## 2023-12-18 ENCOUNTER — LAB REQUISITION (OUTPATIENT)
Dept: LAB | Facility: HOSPITAL | Age: 88
End: 2023-12-18
Payer: MEDICARE

## 2023-12-18 DIAGNOSIS — Z79.01 LONG TERM (CURRENT) USE OF ANTICOAGULANTS: ICD-10-CM

## 2023-12-18 LAB
INR PPP: 2.36 (ref 0.9–1.1)
PROTHROMBIN TIME: 26.5 SECONDS (ref 12.1–14.7)

## 2023-12-18 PROCEDURE — 85610 PROTHROMBIN TIME: CPT | Performed by: INTERNAL MEDICINE

## 2024-01-01 ENCOUNTER — HOSPITAL ENCOUNTER (OUTPATIENT)
Dept: MAMMOGRAPHY | Facility: HOSPITAL | Age: 89
Discharge: HOME | End: 2024-01-09
Payer: MEDICARE

## 2024-01-01 ENCOUNTER — APPOINTMENT (OUTPATIENT)
Dept: GENERAL RADIOLOGY | Facility: HOSPITAL | Age: 89
End: 2024-01-01
Payer: MEDICARE

## 2024-01-01 ENCOUNTER — HOSPITAL ENCOUNTER (EMERGENCY)
Facility: HOSPITAL | Age: 89
End: 2024-01-12
Attending: EMERGENCY MEDICINE
Payer: MEDICARE

## 2024-01-01 VITALS
RESPIRATION RATE: 16 BRPM | TEMPERATURE: 96.9 F | SYSTOLIC BLOOD PRESSURE: 45 MMHG | HEART RATE: 84 BPM | BODY MASS INDEX: 31.39 KG/M2 | WEIGHT: 200 LBS | DIASTOLIC BLOOD PRESSURE: 30 MMHG | OXYGEN SATURATION: 96 % | HEIGHT: 67 IN

## 2024-01-01 DIAGNOSIS — I46.9 CARDIAC ARREST: Primary | ICD-10-CM

## 2024-01-01 LAB
ALBUMIN SERPL-MCNC: 2.6 G/DL (ref 3.5–5.2)
ALBUMIN/GLOB SERPL: 1.4 G/DL
ALP SERPL-CCNC: 68 U/L (ref 39–117)
ALT SERPL W P-5'-P-CCNC: 15 U/L (ref 1–33)
ANION GAP SERPL CALCULATED.3IONS-SCNC: 19.2 MMOL/L (ref 5–15)
ANISOCYTOSIS BLD QL: ABNORMAL
APTT PPP: 76.5 SECONDS (ref 26.5–34.5)
AST SERPL-CCNC: 26 U/L (ref 1–32)
BASOPHILS # BLD MANUAL: 0.06 10*3/MM3 (ref 0–0.2)
BASOPHILS NFR BLD MANUAL: 1 % (ref 0–1.5)
BILIRUB SERPL-MCNC: 0.2 MG/DL (ref 0–1.2)
BUN SERPL-MCNC: 37 MG/DL (ref 8–23)
BUN/CREAT SERPL: 14.3 (ref 7–25)
CALCIUM SPEC-SCNC: 7.5 MG/DL (ref 8.2–9.6)
CHLORIDE SERPL-SCNC: 105 MMOL/L (ref 98–107)
CO2 SERPL-SCNC: 21.8 MMOL/L (ref 22–29)
CREAT SERPL-MCNC: 2.58 MG/DL (ref 0.57–1)
CRP SERPL-MCNC: <0.3 MG/DL (ref 0–0.5)
DEPRECATED RDW RBC AUTO: 54.2 FL (ref 37–54)
EGFRCR SERPLBLD CKD-EPI 2021: 17.1 ML/MIN/1.73
EOSINOPHIL # BLD MANUAL: 0.11 10*3/MM3 (ref 0–0.4)
EOSINOPHIL NFR BLD MANUAL: 2 % (ref 0.3–6.2)
ERYTHROCYTE [DISTWIDTH] IN BLOOD BY AUTOMATED COUNT: 18.2 % (ref 12.3–15.4)
ERYTHROCYTE [SEDIMENTATION RATE] IN BLOOD: <1 MM/HR (ref 0–30)
GLOBULIN UR ELPH-MCNC: 1.9 GM/DL
GLUCOSE BLDC GLUCOMTR-MCNC: 117 MG/DL (ref 70–130)
GLUCOSE SERPL-MCNC: 294 MG/DL (ref 65–99)
HCT VFR BLD AUTO: 28 % (ref 34–46.6)
HGB BLD-MCNC: 7.7 G/DL (ref 12–15.9)
HOLD SPECIMEN: NORMAL
HOLD SPECIMEN: NORMAL
HYPOCHROMIA BLD QL: ABNORMAL
INR PPP: 5.48 (ref 0.9–1.1)
LYMPHOCYTES # BLD MANUAL: 3.42 10*3/MM3 (ref 0.7–3.1)
LYMPHOCYTES NFR BLD MANUAL: 3 % (ref 5–12)
MAGNESIUM SERPL-MCNC: 2.2 MG/DL (ref 1.7–2.3)
MCH RBC QN AUTO: 22.7 PG (ref 26.6–33)
MCHC RBC AUTO-ENTMCNC: 27.5 G/DL (ref 31.5–35.7)
MCV RBC AUTO: 82.6 FL (ref 79–97)
METAMYELOCYTES NFR BLD MANUAL: 1 % (ref 0–0)
MONOCYTES # BLD: 0.17 10*3/MM3 (ref 0.1–0.9)
MYELOCYTES NFR BLD MANUAL: 1 % (ref 0–0)
NEUTROPHILS # BLD AUTO: 1.66 10*3/MM3 (ref 1.7–7)
NEUTROPHILS NFR BLD MANUAL: 29 % (ref 42.7–76)
NEUTS BAND NFR BLD MANUAL: 1 % (ref 0–5)
NRBC SPEC MANUAL: 3 /100 WBC (ref 0–0.2)
NT-PROBNP SERPL-MCNC: ABNORMAL PG/ML (ref 0–1800)
PLATELET # BLD AUTO: 71 10*3/MM3 (ref 140–450)
PMV BLD AUTO: 10.2 FL (ref 6–12)
POTASSIUM SERPL-SCNC: 5.3 MMOL/L (ref 3.5–5.2)
PROT SERPL-MCNC: 4.5 G/DL (ref 6–8.5)
PROTHROMBIN TIME: 50.7 SECONDS (ref 12.1–14.7)
QT INTERVAL: 338 MS
QTC INTERVAL: 489 MS
RBC # BLD AUTO: 3.39 10*6/MM3 (ref 3.77–5.28)
SCAN SLIDE: NORMAL
SCHISTOCYTES BLD QL SMEAR: ABNORMAL
SMALL PLATELETS BLD QL SMEAR: ABNORMAL
SODIUM SERPL-SCNC: 146 MMOL/L (ref 136–145)
TROPONIN T SERPL HS-MCNC: 168 NG/L
VARIANT LYMPHS NFR BLD MANUAL: 62 % (ref 19.6–45.3)
WBC NRBC COR # BLD AUTO: 5.52 10*3/MM3 (ref 3.4–10.8)
WHOLE BLOOD HOLD COAG: NORMAL
WHOLE BLOOD HOLD SPECIMEN: NORMAL

## 2024-01-01 PROCEDURE — 83880 ASSAY OF NATRIURETIC PEPTIDE: CPT | Performed by: EMERGENCY MEDICINE

## 2024-01-01 PROCEDURE — 85025 COMPLETE CBC W/AUTO DIFF WBC: CPT | Performed by: EMERGENCY MEDICINE

## 2024-01-01 PROCEDURE — 93010 ELECTROCARDIOGRAM REPORT: CPT | Performed by: INTERNAL MEDICINE

## 2024-01-01 PROCEDURE — 36415 COLL VENOUS BLD VENIPUNCTURE: CPT

## 2024-01-01 PROCEDURE — 93005 ELECTROCARDIOGRAM TRACING: CPT | Performed by: EMERGENCY MEDICINE

## 2024-01-01 PROCEDURE — 85610 PROTHROMBIN TIME: CPT | Performed by: EMERGENCY MEDICINE

## 2024-01-01 PROCEDURE — 80053 COMPREHEN METABOLIC PANEL: CPT | Performed by: EMERGENCY MEDICINE

## 2024-01-01 PROCEDURE — 85007 BL SMEAR W/DIFF WBC COUNT: CPT | Performed by: EMERGENCY MEDICINE

## 2024-01-01 PROCEDURE — 94799 UNLISTED PULMONARY SVC/PX: CPT

## 2024-01-01 PROCEDURE — 25810000003 SODIUM CHLORIDE 0.9 % SOLUTION: Performed by: EMERGENCY MEDICINE

## 2024-01-01 PROCEDURE — 99285 EMERGENCY DEPT VISIT HI MDM: CPT

## 2024-01-01 PROCEDURE — 25010000002 EPINEPHRINE 1 MG/10ML SOLUTION PREFILLED SYRINGE: Performed by: EMERGENCY MEDICINE

## 2024-01-01 PROCEDURE — 82948 REAGENT STRIP/BLOOD GLUCOSE: CPT

## 2024-01-01 PROCEDURE — 85652 RBC SED RATE AUTOMATED: CPT | Performed by: EMERGENCY MEDICINE

## 2024-01-01 PROCEDURE — 84484 ASSAY OF TROPONIN QUANT: CPT | Performed by: EMERGENCY MEDICINE

## 2024-01-01 PROCEDURE — 86140 C-REACTIVE PROTEIN: CPT | Performed by: EMERGENCY MEDICINE

## 2024-01-01 PROCEDURE — 92950 HEART/LUNG RESUSCITATION CPR: CPT

## 2024-01-01 PROCEDURE — 85730 THROMBOPLASTIN TIME PARTIAL: CPT | Performed by: EMERGENCY MEDICINE

## 2024-01-01 PROCEDURE — 83735 ASSAY OF MAGNESIUM: CPT | Performed by: EMERGENCY MEDICINE

## 2024-01-01 RX ORDER — SODIUM CHLORIDE 0.9 % (FLUSH) 0.9 %
10 SYRINGE (ML) INJECTION AS NEEDED
Status: DISCONTINUED | OUTPATIENT
Start: 2024-01-01 | End: 2024-01-01

## 2024-01-01 RX ORDER — SODIUM CHLORIDE 9 MG/ML
INJECTION, SOLUTION INTRAVENOUS
Status: COMPLETED | OUTPATIENT
Start: 2024-01-01 | End: 2024-01-01

## 2024-01-01 RX ADMIN — SODIUM BICARBONATE 50 MEQ: 84 INJECTION INTRAVENOUS at 09:06

## 2024-01-01 RX ADMIN — EPINEPHRINE 1 MG: 0.1 INJECTION INTRACARDIAC; INTRAVENOUS at 09:08

## 2024-01-01 RX ADMIN — EPINEPHRINE 1 MG: 0.1 INJECTION INTRACARDIAC; INTRAVENOUS at 09:05

## 2024-01-01 RX ADMIN — SODIUM CHLORIDE 500 ML/HR: 9 INJECTION, SOLUTION INTRAVENOUS at 09:13

## 2024-01-03 ENCOUNTER — TRANSCRIBE ORDERS (OUTPATIENT)
Dept: ADMINISTRATIVE | Facility: HOSPITAL | Age: 89
End: 2024-01-03
Payer: MEDICAID

## 2024-01-03 DIAGNOSIS — R06.00 DYSPNEA, UNSPECIFIED TYPE: Primary | ICD-10-CM

## 2024-01-03 DIAGNOSIS — Z12.31 VISIT FOR SCREENING MAMMOGRAM: Primary | ICD-10-CM

## 2024-01-12 NOTE — ED NOTES
Post mortem care performed at this time. Patient belongings placed in bag with patient labels on them.

## 2024-01-14 NOTE — ED PROVIDER NOTES
Subjective     History provided by:  EMS personnel   used: No    Cardiac Arrest  Witnessed by:  Not witnessed  Incident location:  skilled nursing  Time since incident:  45 minutes  Time before ALS initiated:  > 10 minutes  Condition upon EMS arrival:  Unresponsive  Pulse:  Absent  Initial cardiac rhythm per EMS:  Asystole  Treatments prior to arrival:  ACLS protocol  Medications given prior to ED:  Epinephrine  Airway:  Intubation prior to arrival  Rhythm on admission to ED:  Unchanged  Associated symptoms: difficulty breathing and shortness of breath    Associated symptoms: no chest pain, no dizziness, no fatigue, no heartburn, no loss of consciousness, no palpitations, no syncope, no vomiting and no weakness    Risk factors: no COPD, no diabetes mellitus, no drug overdose, no head injury, no heart problem, no hyperlipidemia and no trauma        Review of Systems   Unable to perform ROS: Acuity of condition   Constitutional:  Negative for fatigue.   Respiratory:  Positive for shortness of breath.    Cardiovascular:  Negative for chest pain, palpitations and syncope.   Gastrointestinal:  Negative for heartburn and vomiting.   Neurological:  Negative for dizziness, loss of consciousness and weakness.   All other systems reviewed and are negative.      Past Medical History:   Diagnosis Date    Anemia     Arthritis     CHF (congestive heart failure)     Chronic kidney disease     Coronary artery disease     H/O blood clots     History of transfusion     no reaction     Hypertension        Allergies   Allergen Reactions    Adhesive Tape Dermatitis    Xanax [Alprazolam] Unknown - Low Severity       Past Surgical History:   Procedure Laterality Date    ABDOMINAL SURGERY      APPENDECTOMY      CARDIAC SURGERY       SECTION      COLONOSCOPY N/A 3/7/2018    Procedure: COLONOSCOPY;  Surgeon: Willie Gresham MD;  Location: [facility] ENDOSCOPY;  Service:     CORONARY ANGIOPLASTY WITH STENT PLACEMENT       ENDOSCOPY N/A 3/6/2018    Procedure: ESOPHAGOGASTRODUODENOSCOPY;  Surgeon: Willie Gresham MD;  Location[facility] ENDOSCOPY;  Service:     PACEMAKER IMPLANTATION      left side     SKIN BIOPSY      TUBAL ABDOMINAL LIGATION         Family History   Problem Relation Age of Onset    Other Mother         cardiac disorder    Hypertension Mother     Heart disease Mother     Thyroid disease Mother     Other Father         cardiac disorder    Heart disease Father     Hypertension Father     Other Sister         cardiac disorder    Other Brother         cardiac disorder    Other Other         cardiac disorder    Other Other         cardiac disorder       Social History     Socioeconomic History    Marital status:    Tobacco Use    Smoking status: Never   Vaping Use    Vaping Use: Never used   Substance and Sexual Activity    Alcohol use: No    Drug use: No    Sexual activity: Defer           Objective   Physical Exam  Constitutional:       Appearance: She is ill-appearing.   HENT:      Head: Normocephalic and atraumatic.      Right Ear: Tympanic membrane, ear canal and external ear normal. There is no impacted cerumen.      Left Ear: Tympanic membrane, ear canal and external ear normal. There is no impacted cerumen.      Nose: Nose normal. No congestion or rhinorrhea.      Mouth/Throat:      Mouth: Mucous membranes are moist. Mucous membranes are dry.      Pharynx: Oropharynx is clear. No oropharyngeal exudate or posterior oropharyngeal erythema.   Eyes:      Comments: Bilateral fixed pupils.  Reactive to light.   Cardiovascular:      Comments: No pulse and no heart sounds.  Pulmonary:      Comments: Patient intubated.  Abdominal:      General: There is distension.   Musculoskeletal:         General: Swelling present.      Cervical back: Normal range of motion and neck supple.      Right lower leg: Edema present.      Left lower leg: Edema present.   Skin:     Capillary Refill: Capillary refill takes more than 3  seconds.      Coloration: Skin is pale.   Neurological:      Mental Status: She is unresponsive.      GCS: GCS eye subscore is 1. GCS verbal subscore is 1. GCS motor subscore is 1.         Procedures           ED Course  ED Course as of 24 1019      1040 ECG 12 Lead ED Triage Standing Order; Chest Pain  Vent. Rate : 126 BPM     Atrial Rate : 138 BPM     P-R Int :   * ms          QRS Dur : 134 ms      QT Int : 338 ms       P-R-T Axes :   *  86 -65 degrees     QTc Int : 489 ms     Atrial fibrillation with rapid ventricular response with premature ventricular or aberrantly conducted complexes  Nonspecific intraventricular block  Cannot rule out Anteroseptal infarct , age undetermined  T wave abnormality, consider inferolateral ischemia  Abnormal ECG  When compared with ECG of 2023 23:00,  Previous ECG has undetermined rhythm, needs review  Questionable change in QRS duration  Minimal criteria for Anteroseptal infarct are now present   [ES]   Sun 2024   1018 Patient brought to the emergency department by EMS from the nursing home after cardiac arrest.  Patient intubated at the nursing home and ACLS protocol initiated.  She arrived to the emergency department continued aggressive medical management and ACLS protocol.  Patient did regain ROSC but had long discussion with family/son about overall prognosis.  Patient was made DNR/DNI and passed away. [ES]      ED Course User Index  [ES] Felipe Chaudhary MD                                             Medical Decision Making  Amount and/or Complexity of Data Reviewed  Labs: ordered.  ECG/medicine tests: ordered. Decision-making details documented in ED Course.    Risk  Prescription drug management.        Final diagnoses:   Cardiac arrest       ED Disposition  ED Disposition       ED Disposition       Condition   --    Comment   --               No follow-up provider specified.       Medication List      No changes were  made to your prescriptions during this visit.            Felipe Chaudhary MD  01/14/24 5310
